# Patient Record
Sex: MALE | Race: BLACK OR AFRICAN AMERICAN | Employment: OTHER | URBAN - METROPOLITAN AREA
[De-identification: names, ages, dates, MRNs, and addresses within clinical notes are randomized per-mention and may not be internally consistent; named-entity substitution may affect disease eponyms.]

---

## 2018-05-21 ENCOUNTER — HOSPITAL ENCOUNTER (INPATIENT)
Age: 63
LOS: 4 days | Discharge: OTHER INSTITUTION | DRG: 617 | End: 2018-05-25
Attending: EMERGENCY MEDICINE | Admitting: INTERNAL MEDICINE
Payer: MEDICARE

## 2018-05-21 ENCOUNTER — APPOINTMENT (OUTPATIENT)
Dept: ULTRASOUND IMAGING | Age: 63
DRG: 617 | End: 2018-05-21
Payer: MEDICARE

## 2018-05-21 ENCOUNTER — APPOINTMENT (OUTPATIENT)
Dept: GENERAL RADIOLOGY | Age: 63
DRG: 617 | End: 2018-05-21
Payer: MEDICARE

## 2018-05-21 DIAGNOSIS — E11.621 DIABETIC FOOT ULCER ASSOCIATED WITH TYPE 2 DIABETES MELLITUS, UNSPECIFIED LATERALITY, UNSPECIFIED PART OF FOOT, UNSPECIFIED ULCER STAGE (HCC): Primary | ICD-10-CM

## 2018-05-21 DIAGNOSIS — L97.509 DIABETIC FOOT ULCER ASSOCIATED WITH TYPE 2 DIABETES MELLITUS, UNSPECIFIED LATERALITY, UNSPECIFIED PART OF FOOT, UNSPECIFIED ULCER STAGE (HCC): Primary | ICD-10-CM

## 2018-05-21 DIAGNOSIS — N17.9 ACUTE KIDNEY INJURY (HCC): ICD-10-CM

## 2018-05-21 DIAGNOSIS — E87.5 HYPERKALEMIA: ICD-10-CM

## 2018-05-21 PROBLEM — I73.9 PVD (PERIPHERAL VASCULAR DISEASE) (HCC): Status: ACTIVE | Noted: 2018-05-21

## 2018-05-21 PROBLEM — I10 HTN (HYPERTENSION): Status: ACTIVE | Noted: 2018-05-21

## 2018-05-21 PROBLEM — E11.628 DIABETIC FOOT INFECTION (HCC): Status: ACTIVE | Noted: 2018-05-21

## 2018-05-21 PROBLEM — E11.9 DM II (DIABETES MELLITUS, TYPE II), CONTROLLED (HCC): Status: ACTIVE | Noted: 2018-05-21

## 2018-05-21 PROBLEM — O22.30 DVT (DEEP VEIN THROMBOSIS) IN PREGNANCY: Status: ACTIVE | Noted: 2018-05-21

## 2018-05-21 PROBLEM — L08.9 DIABETIC FOOT INFECTION (HCC): Status: ACTIVE | Noted: 2018-05-21

## 2018-05-21 PROBLEM — E87.20 LACTIC ACIDOSIS: Status: ACTIVE | Noted: 2018-05-21

## 2018-05-21 PROBLEM — E87.1 HYPONATREMIA: Status: ACTIVE | Noted: 2018-05-21

## 2018-05-21 PROBLEM — D68.9 COAGULOPATHY (HCC): Status: ACTIVE | Noted: 2018-05-21

## 2018-05-21 LAB
ANION GAP SERPL CALCULATED.3IONS-SCNC: 13 MMOL/L (ref 7–16)
BUN BLDV-MCNC: 31 MG/DL (ref 8–23)
C-REACTIVE PROTEIN: 20.2 MG/DL (ref 0–0.4)
CALCIUM SERPL-MCNC: 9.9 MG/DL (ref 8.6–10.2)
CHLORIDE BLD-SCNC: 96 MMOL/L (ref 98–107)
CO2: 21 MMOL/L (ref 22–29)
CREAT SERPL-MCNC: 2.1 MG/DL (ref 0.7–1.2)
EKG ATRIAL RATE: 88 BPM
EKG P-R INTERVAL: 238 MS
EKG Q-T INTERVAL: 340 MS
EKG QRS DURATION: 88 MS
EKG QTC CALCULATION (BAZETT): 411 MS
EKG R AXIS: 7 DEGREES
EKG T AXIS: 51 DEGREES
EKG VENTRICULAR RATE: 88 BPM
GFR AFRICAN AMERICAN: 39
GFR NON-AFRICAN AMERICAN: 39 ML/MIN/1.73
GLUCOSE BLD-MCNC: 435 MG/DL (ref 74–109)
HCT VFR BLD CALC: 36.8 % (ref 37–54)
HEMOGLOBIN: 12.1 G/DL (ref 12.5–16.5)
INR BLD: 3.2
LACTIC ACID: 2.4 MMOL/L (ref 0.5–2.2)
MCH RBC QN AUTO: 26.4 PG (ref 26–35)
MCHC RBC AUTO-ENTMCNC: 32.9 % (ref 32–34.5)
MCV RBC AUTO: 80.3 FL (ref 80–99.9)
METER GLUCOSE: 360 MG/DL (ref 70–110)
PDW BLD-RTO: 13.7 FL (ref 11.5–15)
PLATELET # BLD: 211 E9/L (ref 130–450)
PMV BLD AUTO: 12.4 FL (ref 7–12)
POTASSIUM SERPL-SCNC: 6.1 MMOL/L (ref 3.5–5)
PROCALCITONIN: 0.23 NG/ML (ref 0–0.08)
PROTHROMBIN TIME: 36.6 SEC (ref 9.3–12.4)
RBC # BLD: 4.58 E12/L (ref 3.8–5.8)
SEDIMENTATION RATE, ERYTHROCYTE: 114 MM/HR (ref 0–15)
SODIUM BLD-SCNC: 130 MMOL/L (ref 132–146)
WBC # BLD: 17.9 E9/L (ref 4.5–11.5)

## 2018-05-21 PROCEDURE — 6370000000 HC RX 637 (ALT 250 FOR IP): Performed by: FAMILY MEDICINE

## 2018-05-21 PROCEDURE — 36415 COLL VENOUS BLD VENIPUNCTURE: CPT

## 2018-05-21 PROCEDURE — 84145 PROCALCITONIN (PCT): CPT

## 2018-05-21 PROCEDURE — 87186 SC STD MICRODIL/AGAR DIL: CPT

## 2018-05-21 PROCEDURE — 87040 BLOOD CULTURE FOR BACTERIA: CPT

## 2018-05-21 PROCEDURE — 73590 X-RAY EXAM OF LOWER LEG: CPT

## 2018-05-21 PROCEDURE — 87077 CULTURE AEROBIC IDENTIFY: CPT

## 2018-05-21 PROCEDURE — 99285 EMERGENCY DEPT VISIT HI MDM: CPT

## 2018-05-21 PROCEDURE — 85610 PROTHROMBIN TIME: CPT

## 2018-05-21 PROCEDURE — 86140 C-REACTIVE PROTEIN: CPT

## 2018-05-21 PROCEDURE — 85651 RBC SED RATE NONAUTOMATED: CPT

## 2018-05-21 PROCEDURE — 83605 ASSAY OF LACTIC ACID: CPT

## 2018-05-21 PROCEDURE — 82962 GLUCOSE BLOOD TEST: CPT

## 2018-05-21 PROCEDURE — 6360000002 HC RX W HCPCS: Performed by: FAMILY MEDICINE

## 2018-05-21 PROCEDURE — 87149 DNA/RNA DIRECT PROBE: CPT

## 2018-05-21 PROCEDURE — 80048 BASIC METABOLIC PNL TOTAL CA: CPT

## 2018-05-21 PROCEDURE — 93971 EXTREMITY STUDY: CPT

## 2018-05-21 PROCEDURE — 0KBV0ZZ EXCISION OF RIGHT FOOT MUSCLE, OPEN APPROACH: ICD-10-PCS | Performed by: PODIATRIST

## 2018-05-21 PROCEDURE — 2580000003 HC RX 258: Performed by: EMERGENCY MEDICINE

## 2018-05-21 PROCEDURE — 6360000002 HC RX W HCPCS: Performed by: EMERGENCY MEDICINE

## 2018-05-21 PROCEDURE — 2500000003 HC RX 250 WO HCPCS: Performed by: FAMILY MEDICINE

## 2018-05-21 PROCEDURE — 73630 X-RAY EXAM OF FOOT: CPT

## 2018-05-21 PROCEDURE — 93005 ELECTROCARDIOGRAM TRACING: CPT | Performed by: FAMILY MEDICINE

## 2018-05-21 PROCEDURE — 2580000003 HC RX 258: Performed by: FAMILY MEDICINE

## 2018-05-21 PROCEDURE — 85027 COMPLETE CBC AUTOMATED: CPT

## 2018-05-21 PROCEDURE — 1200000000 HC SEMI PRIVATE

## 2018-05-21 RX ORDER — SODIUM CHLORIDE 9 MG/ML
INJECTION, SOLUTION INTRAVENOUS CONTINUOUS
Status: DISCONTINUED | OUTPATIENT
Start: 2018-05-21 | End: 2018-05-25 | Stop reason: HOSPADM

## 2018-05-21 RX ORDER — MORPHINE SULFATE 4 MG/ML
4 INJECTION, SOLUTION INTRAMUSCULAR; INTRAVENOUS ONCE
Status: COMPLETED | OUTPATIENT
Start: 2018-05-21 | End: 2018-05-21

## 2018-05-21 RX ORDER — LORAZEPAM 1 MG/1
1 TABLET ORAL NIGHTLY PRN
Status: DISCONTINUED | OUTPATIENT
Start: 2018-05-21 | End: 2018-05-25 | Stop reason: HOSPADM

## 2018-05-21 RX ORDER — ESOMEPRAZOLE MAGNESIUM 40 MG/1
40 CAPSULE, DELAYED RELEASE ORAL
Status: ON HOLD | COMMUNITY
End: 2020-06-23 | Stop reason: HOSPADM

## 2018-05-21 RX ORDER — DEXTROSE MONOHYDRATE 50 MG/ML
100 INJECTION, SOLUTION INTRAVENOUS PRN
Status: DISCONTINUED | OUTPATIENT
Start: 2018-05-21 | End: 2018-05-25 | Stop reason: HOSPADM

## 2018-05-21 RX ORDER — METOPROLOL TARTRATE 50 MG/1
100 TABLET, FILM COATED ORAL 2 TIMES DAILY
Status: DISCONTINUED | OUTPATIENT
Start: 2018-05-21 | End: 2018-05-25 | Stop reason: HOSPADM

## 2018-05-21 RX ORDER — WARFARIN SODIUM 5 MG/1
5 TABLET ORAL
Status: ON HOLD | COMMUNITY
End: 2018-11-14 | Stop reason: HOSPADM

## 2018-05-21 RX ORDER — NICOTINE POLACRILEX 4 MG
15 LOZENGE BUCCAL PRN
Status: DISCONTINUED | OUTPATIENT
Start: 2018-05-21 | End: 2018-05-25 | Stop reason: HOSPADM

## 2018-05-21 RX ORDER — TIZANIDINE 4 MG/1
4 TABLET ORAL EVERY 8 HOURS PRN
Status: ON HOLD | COMMUNITY
End: 2020-06-23 | Stop reason: HOSPADM

## 2018-05-21 RX ORDER — ESOMEPRAZOLE MAGNESIUM 40 MG/1
40 FOR SUSPENSION ORAL DAILY
COMMUNITY
End: 2018-05-21

## 2018-05-21 RX ORDER — SODIUM CHLORIDE 0.9 % (FLUSH) 0.9 %
10 SYRINGE (ML) INJECTION EVERY 12 HOURS SCHEDULED
Status: DISCONTINUED | OUTPATIENT
Start: 2018-05-21 | End: 2018-05-25 | Stop reason: HOSPADM

## 2018-05-21 RX ORDER — DULOXETIN HYDROCHLORIDE 60 MG/1
120 CAPSULE, DELAYED RELEASE ORAL EVERY MORNING
Status: DISCONTINUED | OUTPATIENT
Start: 2018-05-22 | End: 2018-05-25 | Stop reason: HOSPADM

## 2018-05-21 RX ORDER — PREGABALIN 100 MG/1
100 CAPSULE ORAL 2 TIMES DAILY
Status: ON HOLD | COMMUNITY
End: 2020-06-23 | Stop reason: HOSPADM

## 2018-05-21 RX ORDER — LORAZEPAM 1 MG/1
1 TABLET ORAL NIGHTLY PRN
Status: ON HOLD | COMMUNITY
End: 2018-11-14 | Stop reason: HOSPADM

## 2018-05-21 RX ORDER — PANTOPRAZOLE SODIUM 40 MG/1
40 TABLET, DELAYED RELEASE ORAL
Status: DISCONTINUED | OUTPATIENT
Start: 2018-05-22 | End: 2018-05-25 | Stop reason: HOSPADM

## 2018-05-21 RX ORDER — METOPROLOL TARTRATE 100 MG/1
100 TABLET ORAL 2 TIMES DAILY
COMMUNITY
End: 2018-12-12 | Stop reason: SDUPTHER

## 2018-05-21 RX ORDER — DEXTROSE MONOHYDRATE 25 G/50ML
12.5 INJECTION, SOLUTION INTRAVENOUS PRN
Status: DISCONTINUED | OUTPATIENT
Start: 2018-05-21 | End: 2018-05-25 | Stop reason: HOSPADM

## 2018-05-21 RX ORDER — LISINOPRIL 10 MG/1
10 TABLET ORAL 2 TIMES DAILY
Status: ON HOLD | COMMUNITY
End: 2018-05-30 | Stop reason: HOSPADM

## 2018-05-21 RX ORDER — CILOSTAZOL 100 MG/1
100 TABLET ORAL 2 TIMES DAILY
Status: DISCONTINUED | OUTPATIENT
Start: 2018-05-21 | End: 2018-05-25 | Stop reason: HOSPADM

## 2018-05-21 RX ORDER — OXYCODONE HYDROCHLORIDE AND ACETAMINOPHEN 5; 325 MG/1; MG/1
1 TABLET ORAL EVERY 6 HOURS PRN
Status: DISCONTINUED | OUTPATIENT
Start: 2018-05-21 | End: 2018-05-25 | Stop reason: HOSPADM

## 2018-05-21 RX ORDER — SODIUM CHLORIDE 0.9 % (FLUSH) 0.9 %
10 SYRINGE (ML) INJECTION PRN
Status: DISCONTINUED | OUTPATIENT
Start: 2018-05-21 | End: 2018-05-21 | Stop reason: SDUPTHER

## 2018-05-21 RX ORDER — DULOXETIN HYDROCHLORIDE 60 MG/1
120 CAPSULE, DELAYED RELEASE ORAL EVERY MORNING
Status: ON HOLD | COMMUNITY
End: 2018-11-14 | Stop reason: HOSPADM

## 2018-05-21 RX ORDER — SPIRONOLACTONE 50 MG/1
50 TABLET, FILM COATED ORAL EVERY MORNING
COMMUNITY
End: 2020-10-13

## 2018-05-21 RX ORDER — INSULIN HUMAN 500 [IU]/ML
INJECTION, SOLUTION SUBCUTANEOUS
Status: ON HOLD | COMMUNITY
End: 2018-05-30 | Stop reason: HOSPADM

## 2018-05-21 RX ORDER — AMLODIPINE BESYLATE 10 MG/1
10 TABLET ORAL EVERY MORNING
Status: DISCONTINUED | OUTPATIENT
Start: 2018-05-22 | End: 2018-05-25 | Stop reason: HOSPADM

## 2018-05-21 RX ORDER — CLINDAMYCIN PHOSPHATE 600 MG/50ML
600 INJECTION INTRAVENOUS ONCE
Status: DISCONTINUED | OUTPATIENT
Start: 2018-05-21 | End: 2018-05-21

## 2018-05-21 RX ORDER — ONDANSETRON 2 MG/ML
4 INJECTION INTRAMUSCULAR; INTRAVENOUS EVERY 6 HOURS PRN
Status: DISCONTINUED | OUTPATIENT
Start: 2018-05-21 | End: 2018-05-25 | Stop reason: HOSPADM

## 2018-05-21 RX ORDER — OXYCODONE HCL 10 MG/1
10 TABLET, FILM COATED, EXTENDED RELEASE ORAL EVERY 12 HOURS
Status: DISCONTINUED | OUTPATIENT
Start: 2018-05-21 | End: 2018-05-25 | Stop reason: HOSPADM

## 2018-05-21 RX ORDER — SODIUM POLYSTYRENE SULFONATE 15 G/60ML
15 SUSPENSION ORAL; RECTAL ONCE
Status: COMPLETED | OUTPATIENT
Start: 2018-05-21 | End: 2018-05-22

## 2018-05-21 RX ORDER — SODIUM CHLORIDE 0.9 % (FLUSH) 0.9 %
10 SYRINGE (ML) INJECTION PRN
Status: DISCONTINUED | OUTPATIENT
Start: 2018-05-21 | End: 2018-05-24 | Stop reason: SDUPTHER

## 2018-05-21 RX ORDER — CLONIDINE HYDROCHLORIDE 0.2 MG/1
0.2 TABLET ORAL 3 TIMES DAILY
Status: DISCONTINUED | OUTPATIENT
Start: 2018-05-21 | End: 2018-05-25 | Stop reason: HOSPADM

## 2018-05-21 RX ORDER — CILOSTAZOL 100 MG/1
100 TABLET ORAL 2 TIMES DAILY
Status: ON HOLD | COMMUNITY
End: 2020-06-23 | Stop reason: HOSPADM

## 2018-05-21 RX ORDER — AMLODIPINE BESYLATE 10 MG/1
10 TABLET ORAL EVERY MORNING
Status: ON HOLD | COMMUNITY
End: 2018-08-07

## 2018-05-21 RX ORDER — TIZANIDINE 4 MG/1
4 TABLET ORAL 3 TIMES DAILY
Status: DISCONTINUED | OUTPATIENT
Start: 2018-05-21 | End: 2018-05-25 | Stop reason: HOSPADM

## 2018-05-21 RX ORDER — OXYCODONE HCL 10 MG/1
10 TABLET, FILM COATED, EXTENDED RELEASE ORAL EVERY 8 HOURS
Status: ON HOLD | COMMUNITY
End: 2018-11-14 | Stop reason: HOSPADM

## 2018-05-21 RX ORDER — CLONIDINE HYDROCHLORIDE 0.2 MG/1
0.2 TABLET ORAL 3 TIMES DAILY
Status: ON HOLD | COMMUNITY
End: 2018-08-07

## 2018-05-21 RX ORDER — CLINDAMYCIN PHOSPHATE 900 MG/50ML
900 INJECTION INTRAVENOUS ONCE
Status: COMPLETED | OUTPATIENT
Start: 2018-05-21 | End: 2018-05-22

## 2018-05-21 RX ORDER — ATORVASTATIN CALCIUM 10 MG/1
10 TABLET, FILM COATED ORAL DAILY
Status: DISCONTINUED | OUTPATIENT
Start: 2018-05-22 | End: 2018-05-25 | Stop reason: HOSPADM

## 2018-05-21 RX ORDER — ONDANSETRON 2 MG/ML
4 INJECTION INTRAMUSCULAR; INTRAVENOUS EVERY 6 HOURS PRN
Status: DISCONTINUED | OUTPATIENT
Start: 2018-05-21 | End: 2018-05-21 | Stop reason: SDUPTHER

## 2018-05-21 RX ORDER — WARFARIN SODIUM 10 MG/1
10 TABLET ORAL
Status: ON HOLD | COMMUNITY
End: 2018-11-14 | Stop reason: HOSPADM

## 2018-05-21 RX ORDER — SODIUM CHLORIDE 0.9 % (FLUSH) 0.9 %
10 SYRINGE (ML) INJECTION EVERY 12 HOURS SCHEDULED
Status: DISCONTINUED | OUTPATIENT
Start: 2018-05-21 | End: 2018-05-21 | Stop reason: SDUPTHER

## 2018-05-21 RX ORDER — ATORVASTATIN CALCIUM 10 MG/1
10 TABLET, FILM COATED ORAL DAILY
COMMUNITY
End: 2020-10-13

## 2018-05-21 RX ADMIN — INSULIN HUMAN 10 UNITS: 100 INJECTION, SOLUTION PARENTERAL at 18:22

## 2018-05-21 RX ADMIN — VANCOMYCIN HYDROCHLORIDE 2000 MG: 10 INJECTION, POWDER, LYOPHILIZED, FOR SOLUTION INTRAVENOUS at 18:18

## 2018-05-21 RX ADMIN — OXYCODONE HYDROCHLORIDE AND ACETAMINOPHEN 1 TABLET: 5; 325 TABLET ORAL at 16:26

## 2018-05-21 RX ADMIN — MORPHINE SULFATE 4 MG: 4 INJECTION INTRAVENOUS at 17:58

## 2018-05-21 RX ADMIN — CALCIUM GLUCONATE 2 G: 98 INJECTION, SOLUTION INTRAVENOUS at 18:24

## 2018-05-21 RX ADMIN — SODIUM BICARBONATE 50 MEQ: 84 INJECTION, SOLUTION INTRAVENOUS at 18:00

## 2018-05-21 ASSESSMENT — PAIN DESCRIPTION - LOCATION: LOCATION: LEG

## 2018-05-21 ASSESSMENT — ENCOUNTER SYMPTOMS
NAUSEA: 0
COUGH: 0
SHORTNESS OF BREATH: 0
CHEST TIGHTNESS: 0
ABDOMINAL PAIN: 0
DIARRHEA: 0
CONSTIPATION: 0
VOMITING: 0

## 2018-05-21 ASSESSMENT — PAIN SCALES - GENERAL
PAINLEVEL_OUTOF10: 5
PAINLEVEL_OUTOF10: 7
PAINLEVEL_OUTOF10: 10

## 2018-05-21 ASSESSMENT — PAIN DESCRIPTION - ORIENTATION: ORIENTATION: RIGHT

## 2018-05-22 ENCOUNTER — APPOINTMENT (OUTPATIENT)
Dept: INTERVENTIONAL RADIOLOGY/VASCULAR | Age: 63
DRG: 617 | End: 2018-05-22
Payer: MEDICARE

## 2018-05-22 PROBLEM — L97.412 DIABETIC ULCER OF RIGHT MIDFOOT ASSOCIATED WITH TYPE 2 DIABETES MELLITUS, WITH FAT LAYER EXPOSED (HCC): Chronic | Status: ACTIVE | Noted: 2018-05-22

## 2018-05-22 PROBLEM — E11.621 DIABETIC ULCER OF RIGHT MIDFOOT ASSOCIATED WITH TYPE 2 DIABETES MELLITUS, WITH FAT LAYER EXPOSED (HCC): Chronic | Status: ACTIVE | Noted: 2018-05-22

## 2018-05-22 PROBLEM — I70.45 ATHEROSCLEROSIS OF AUTOLOGOUS VEIN BYPASS GRAFT OF EXTREMITY WITH ULCERATION (HCC): Chronic | Status: ACTIVE | Noted: 2018-05-22

## 2018-05-22 LAB
ANION GAP SERPL CALCULATED.3IONS-SCNC: 14 MMOL/L (ref 7–16)
ANION GAP SERPL CALCULATED.3IONS-SCNC: 15 MMOL/L (ref 7–16)
BASOPHILS ABSOLUTE: 0.06 E9/L (ref 0–0.2)
BASOPHILS RELATIVE PERCENT: 0.3 % (ref 0–2)
BUN BLDV-MCNC: 29 MG/DL (ref 8–23)
BUN BLDV-MCNC: 33 MG/DL (ref 8–23)
CALCIUM SERPL-MCNC: 9.3 MG/DL (ref 8.6–10.2)
CALCIUM SERPL-MCNC: 9.5 MG/DL (ref 8.6–10.2)
CHLORIDE BLD-SCNC: 96 MMOL/L (ref 98–107)
CHLORIDE BLD-SCNC: 97 MMOL/L (ref 98–107)
CO2: 20 MMOL/L (ref 22–29)
CO2: 21 MMOL/L (ref 22–29)
CREAT SERPL-MCNC: 2.2 MG/DL (ref 0.7–1.2)
CREAT SERPL-MCNC: 2.5 MG/DL (ref 0.7–1.2)
EOSINOPHILS ABSOLUTE: 0.06 E9/L (ref 0.05–0.5)
EOSINOPHILS RELATIVE PERCENT: 0.3 % (ref 0–6)
GFR AFRICAN AMERICAN: 32
GFR AFRICAN AMERICAN: 37
GFR NON-AFRICAN AMERICAN: 32 ML/MIN/1.73
GFR NON-AFRICAN AMERICAN: 37 ML/MIN/1.73
GLUCOSE BLD-MCNC: 328 MG/DL (ref 74–109)
GLUCOSE BLD-MCNC: 427 MG/DL (ref 74–109)
HBA1C MFR BLD: 13.3 % (ref 4.8–5.9)
HCT VFR BLD CALC: 33.7 % (ref 37–54)
HEMOGLOBIN: 11.3 G/DL (ref 12.5–16.5)
IMMATURE GRANULOCYTES #: 0.15 E9/L
IMMATURE GRANULOCYTES %: 0.9 % (ref 0–5)
INR BLD: 3.4
LACTIC ACID: 1 MMOL/L (ref 0.5–2.2)
LYMPHOCYTES ABSOLUTE: 1.93 E9/L (ref 1.5–4)
LYMPHOCYTES RELATIVE PERCENT: 11.2 % (ref 20–42)
MCH RBC QN AUTO: 26.5 PG (ref 26–35)
MCHC RBC AUTO-ENTMCNC: 33.5 % (ref 32–34.5)
MCV RBC AUTO: 78.9 FL (ref 80–99.9)
METER GLUCOSE: 299 MG/DL (ref 70–110)
METER GLUCOSE: 374 MG/DL (ref 70–110)
METER GLUCOSE: 441 MG/DL (ref 70–110)
METER GLUCOSE: 453 MG/DL (ref 70–110)
MONOCYTES ABSOLUTE: 1.55 E9/L (ref 0.1–0.95)
MONOCYTES RELATIVE PERCENT: 9 % (ref 2–12)
NEUTROPHILS ABSOLUTE: 13.45 E9/L (ref 1.8–7.3)
NEUTROPHILS RELATIVE PERCENT: 78.3 % (ref 43–80)
PDW BLD-RTO: 13.7 FL (ref 11.5–15)
PLATELET # BLD: 190 E9/L (ref 130–450)
PMV BLD AUTO: 13.1 FL (ref 7–12)
POTASSIUM REFLEX MAGNESIUM: 5.8 MMOL/L (ref 3.5–5)
POTASSIUM SERPL-SCNC: 5 MMOL/L (ref 3.5–5)
POTASSIUM SERPL-SCNC: 5.6 MMOL/L (ref 3.5–5)
PROTHROMBIN TIME: 38.7 SEC (ref 9.3–12.4)
RBC # BLD: 4.27 E12/L (ref 3.8–5.8)
RBC # BLD: NORMAL 10*6/UL
SODIUM BLD-SCNC: 131 MMOL/L (ref 132–146)
SODIUM BLD-SCNC: 132 MMOL/L (ref 132–146)
WBC # BLD: 17.2 E9/L (ref 4.5–11.5)

## 2018-05-22 PROCEDURE — 2580000003 HC RX 258: Performed by: INTERNAL MEDICINE

## 2018-05-22 PROCEDURE — 84132 ASSAY OF SERUM POTASSIUM: CPT

## 2018-05-22 PROCEDURE — 83605 ASSAY OF LACTIC ACID: CPT

## 2018-05-22 PROCEDURE — 85025 COMPLETE CBC W/AUTO DIFF WBC: CPT

## 2018-05-22 PROCEDURE — 2500000003 HC RX 250 WO HCPCS: Performed by: SPECIALIST

## 2018-05-22 PROCEDURE — G8988 SELF CARE GOAL STATUS: HCPCS

## 2018-05-22 PROCEDURE — 2500000003 HC RX 250 WO HCPCS: Performed by: EMERGENCY MEDICINE

## 2018-05-22 PROCEDURE — 87077 CULTURE AEROBIC IDENTIFY: CPT

## 2018-05-22 PROCEDURE — 87075 CULTR BACTERIA EXCEPT BLOOD: CPT

## 2018-05-22 PROCEDURE — 83036 HEMOGLOBIN GLYCOSYLATED A1C: CPT

## 2018-05-22 PROCEDURE — 93923 UPR/LXTR ART STDY 3+ LVLS: CPT

## 2018-05-22 PROCEDURE — 36415 COLL VENOUS BLD VENIPUNCTURE: CPT

## 2018-05-22 PROCEDURE — 6360000002 HC RX W HCPCS: Performed by: INTERNAL MEDICINE

## 2018-05-22 PROCEDURE — 6370000000 HC RX 637 (ALT 250 FOR IP): Performed by: FAMILY MEDICINE

## 2018-05-22 PROCEDURE — 1200000000 HC SEMI PRIVATE

## 2018-05-22 PROCEDURE — 6370000000 HC RX 637 (ALT 250 FOR IP): Performed by: PODIATRIST

## 2018-05-22 PROCEDURE — 6370000000 HC RX 637 (ALT 250 FOR IP): Performed by: NURSE PRACTITIONER

## 2018-05-22 PROCEDURE — 82962 GLUCOSE BLOOD TEST: CPT

## 2018-05-22 PROCEDURE — 85610 PROTHROMBIN TIME: CPT

## 2018-05-22 PROCEDURE — 6370000000 HC RX 637 (ALT 250 FOR IP): Performed by: INTERNAL MEDICINE

## 2018-05-22 PROCEDURE — 87186 SC STD MICRODIL/AGAR DIL: CPT

## 2018-05-22 PROCEDURE — 99222 1ST HOSP IP/OBS MODERATE 55: CPT | Performed by: SURGERY

## 2018-05-22 PROCEDURE — 97161 PT EVAL LOW COMPLEX 20 MIN: CPT

## 2018-05-22 PROCEDURE — 80048 BASIC METABOLIC PNL TOTAL CA: CPT

## 2018-05-22 PROCEDURE — 97165 OT EVAL LOW COMPLEX 30 MIN: CPT

## 2018-05-22 PROCEDURE — G8987 SELF CARE CURRENT STATUS: HCPCS

## 2018-05-22 PROCEDURE — 87070 CULTURE OTHR SPECIMN AEROBIC: CPT

## 2018-05-22 RX ORDER — CLINDAMYCIN PHOSPHATE 900 MG/50ML
900 INJECTION INTRAVENOUS EVERY 8 HOURS
Status: DISCONTINUED | OUTPATIENT
Start: 2018-05-22 | End: 2018-05-25 | Stop reason: HOSPADM

## 2018-05-22 RX ORDER — INSULIN GLARGINE 100 [IU]/ML
15 INJECTION, SOLUTION SUBCUTANEOUS NIGHTLY
Status: DISCONTINUED | OUTPATIENT
Start: 2018-05-22 | End: 2018-05-25 | Stop reason: HOSPADM

## 2018-05-22 RX ORDER — SODIUM POLYSTYRENE SULFONATE 15 G/60ML
15 SUSPENSION ORAL; RECTAL ONCE
Status: COMPLETED | OUTPATIENT
Start: 2018-05-22 | End: 2018-05-22

## 2018-05-22 RX ADMIN — SODIUM POLYSTYRENE SULFONATE 15 G: 15 SUSPENSION ORAL; RECTAL at 13:45

## 2018-05-22 RX ADMIN — PIPERACILLIN SODIUM AND TAZOBACTAM SODIUM 3.38 G: 3; .375 INJECTION, POWDER, LYOPHILIZED, FOR SOLUTION INTRAVENOUS at 17:58

## 2018-05-22 RX ADMIN — INSULIN HUMAN 80 UNITS: 100 INJECTION, SOLUTION PARENTERAL at 17:58

## 2018-05-22 RX ADMIN — INSULIN LISPRO 18 UNITS: 100 INJECTION, SOLUTION INTRAVENOUS; SUBCUTANEOUS at 09:30

## 2018-05-22 RX ADMIN — METOPROLOL TARTRATE 100 MG: 50 TABLET ORAL at 09:21

## 2018-05-22 RX ADMIN — PANTOPRAZOLE SODIUM 40 MG: 40 TABLET, DELAYED RELEASE ORAL at 06:16

## 2018-05-22 RX ADMIN — PREGABALIN 200 MG: 75 CAPSULE ORAL at 00:42

## 2018-05-22 RX ADMIN — CLONIDINE HYDROCHLORIDE 0.2 MG: 0.2 TABLET ORAL at 06:17

## 2018-05-22 RX ADMIN — INSULIN HUMAN 70 UNITS: 100 INJECTION, SOLUTION PARENTERAL at 13:33

## 2018-05-22 RX ADMIN — PIPERACILLIN SODIUM AND TAZOBACTAM SODIUM 3.38 G: 3; .375 INJECTION, POWDER, LYOPHILIZED, FOR SOLUTION INTRAVENOUS at 11:03

## 2018-05-22 RX ADMIN — OXYCODONE HYDROCHLORIDE 10 MG: 10 TABLET, FILM COATED, EXTENDED RELEASE ORAL at 22:04

## 2018-05-22 RX ADMIN — TIZANIDINE 4 MG: 4 TABLET ORAL at 09:21

## 2018-05-22 RX ADMIN — METOPROLOL TARTRATE 100 MG: 50 TABLET ORAL at 00:44

## 2018-05-22 RX ADMIN — PREGABALIN 200 MG: 75 CAPSULE ORAL at 13:46

## 2018-05-22 RX ADMIN — TIZANIDINE 4 MG: 4 TABLET ORAL at 22:03

## 2018-05-22 RX ADMIN — CILOSTAZOL 100 MG: 100 TABLET ORAL at 09:21

## 2018-05-22 RX ADMIN — ATORVASTATIN CALCIUM 10 MG: 10 TABLET, FILM COATED ORAL at 09:22

## 2018-05-22 RX ADMIN — Medication 10 ML: at 00:48

## 2018-05-22 RX ADMIN — CILOSTAZOL 100 MG: 100 TABLET ORAL at 22:04

## 2018-05-22 RX ADMIN — PETROLATUM: 42 OINTMENT TOPICAL at 09:22

## 2018-05-22 RX ADMIN — OXYCODONE HYDROCHLORIDE 10 MG: 10 TABLET, FILM COATED, EXTENDED RELEASE ORAL at 00:44

## 2018-05-22 RX ADMIN — INSULIN LISPRO 18 UNITS: 100 INJECTION, SOLUTION INTRAVENOUS; SUBCUTANEOUS at 13:35

## 2018-05-22 RX ADMIN — CLONIDINE HYDROCHLORIDE 0.2 MG: 0.2 TABLET ORAL at 13:21

## 2018-05-22 RX ADMIN — SODIUM POLYSTYRENE SULFONATE 15 G: 15 SUSPENSION ORAL; RECTAL at 03:43

## 2018-05-22 RX ADMIN — PETROLATUM: 42 OINTMENT TOPICAL at 00:50

## 2018-05-22 RX ADMIN — SODIUM CHLORIDE: 9 INJECTION, SOLUTION INTRAVENOUS at 22:07

## 2018-05-22 RX ADMIN — CLINDAMYCIN PHOSPHATE 900 MG: 900 INJECTION INTRAVENOUS at 16:05

## 2018-05-22 RX ADMIN — CLINDAMYCIN PHOSPHATE 900 MG: 900 INJECTION INTRAVENOUS at 01:31

## 2018-05-22 RX ADMIN — OXYCODONE HYDROCHLORIDE 10 MG: 10 TABLET, FILM COATED, EXTENDED RELEASE ORAL at 09:22

## 2018-05-22 RX ADMIN — TIZANIDINE 4 MG: 4 TABLET ORAL at 13:46

## 2018-05-22 RX ADMIN — PREGABALIN 200 MG: 75 CAPSULE ORAL at 22:03

## 2018-05-22 RX ADMIN — TIZANIDINE 4 MG: 4 TABLET ORAL at 00:40

## 2018-05-22 RX ADMIN — Medication 10 ML: at 11:04

## 2018-05-22 RX ADMIN — PETROLATUM: 42 OINTMENT TOPICAL at 22:11

## 2018-05-22 RX ADMIN — SODIUM CHLORIDE: 9 INJECTION, SOLUTION INTRAVENOUS at 00:39

## 2018-05-22 RX ADMIN — PIPERACILLIN SODIUM AND TAZOBACTAM SODIUM 3.38 G: 3; .375 INJECTION, POWDER, LYOPHILIZED, FOR SOLUTION INTRAVENOUS at 01:35

## 2018-05-22 RX ADMIN — INSULIN LISPRO 7 UNITS: 100 INJECTION, SOLUTION INTRAVENOUS; SUBCUTANEOUS at 00:40

## 2018-05-22 RX ADMIN — AMLODIPINE BESYLATE 10 MG: 10 TABLET ORAL at 09:21

## 2018-05-22 RX ADMIN — INSULIN LISPRO 15 UNITS: 100 INJECTION, SOLUTION INTRAVENOUS; SUBCUTANEOUS at 17:57

## 2018-05-22 RX ADMIN — PREGABALIN 200 MG: 75 CAPSULE ORAL at 06:15

## 2018-05-22 RX ADMIN — DULOXETINE HYDROCHLORIDE 120 MG: 60 CAPSULE, DELAYED RELEASE ORAL at 09:21

## 2018-05-22 RX ADMIN — CILOSTAZOL 100 MG: 100 TABLET ORAL at 00:39

## 2018-05-22 RX ADMIN — INSULIN GLARGINE 15 UNITS: 100 INJECTION, SOLUTION SUBCUTANEOUS at 22:04

## 2018-05-22 RX ADMIN — INSULIN LISPRO 5 UNITS: 100 INJECTION, SOLUTION INTRAVENOUS; SUBCUTANEOUS at 22:04

## 2018-05-22 ASSESSMENT — PAIN DESCRIPTION - ORIENTATION: ORIENTATION: RIGHT

## 2018-05-22 ASSESSMENT — PAIN SCALES - GENERAL
PAINLEVEL_OUTOF10: 5
PAINLEVEL_OUTOF10: 5
PAINLEVEL_OUTOF10: 4
PAINLEVEL_OUTOF10: 0
PAINLEVEL_OUTOF10: 8

## 2018-05-22 ASSESSMENT — PAIN DESCRIPTION - LOCATION: LOCATION: LEG

## 2018-05-22 ASSESSMENT — PAIN DESCRIPTION - PROGRESSION: CLINICAL_PROGRESSION: NOT CHANGED

## 2018-05-22 ASSESSMENT — PAIN DESCRIPTION - FREQUENCY: FREQUENCY: INTERMITTENT

## 2018-05-22 ASSESSMENT — PAIN DESCRIPTION - ONSET: ONSET: ON-GOING

## 2018-05-23 ENCOUNTER — APPOINTMENT (OUTPATIENT)
Dept: ULTRASOUND IMAGING | Age: 63
DRG: 617 | End: 2018-05-23
Payer: MEDICARE

## 2018-05-23 ENCOUNTER — ANESTHESIA EVENT (OUTPATIENT)
Dept: OPERATING ROOM | Age: 63
DRG: 617 | End: 2018-05-23
Payer: MEDICARE

## 2018-05-23 PROBLEM — E44.0 MODERATE PROTEIN-CALORIE MALNUTRITION (HCC): Chronic | Status: ACTIVE | Noted: 2018-05-23

## 2018-05-23 LAB
ALBUMIN SERPL-MCNC: 3.2 G/DL (ref 3.5–5.2)
ALP BLD-CCNC: 168 U/L (ref 40–129)
ALT SERPL-CCNC: 90 U/L (ref 0–40)
AMORPHOUS: ABNORMAL
ANION GAP SERPL CALCULATED.3IONS-SCNC: 16 MMOL/L (ref 7–16)
ANTISTREPTOLYSIN-O: 30 IU/ML (ref 0–200)
AST SERPL-CCNC: 70 U/L (ref 0–39)
BACTERIA: ABNORMAL /HPF
BILIRUB SERPL-MCNC: 1 MG/DL (ref 0–1.2)
BILIRUBIN URINE: NEGATIVE
BLOOD, URINE: ABNORMAL
BUN BLDV-MCNC: 32 MG/DL (ref 8–23)
C-REACTIVE PROTEIN: 23.5 MG/DL (ref 0–0.4)
CALCIUM IONIZED: 1.27 MMOL/L (ref 1.15–1.33)
CALCIUM SERPL-MCNC: 9.2 MG/DL (ref 8.6–10.2)
CHLORIDE BLD-SCNC: 95 MMOL/L (ref 98–107)
CHLORIDE URINE RANDOM: <20 MMOL/L
CHOLESTEROL, TOTAL: 108 MG/DL (ref 0–199)
CLARITY: CLEAR
CO2: 21 MMOL/L (ref 22–29)
COLOR: YELLOW
CREAT SERPL-MCNC: 2.5 MG/DL (ref 0.7–1.2)
CREATININE URINE: 210 MG/DL (ref 40–278)
CREATININE URINE: 222 MG/DL (ref 40–278)
EPITHELIAL CELLS, UA: ABNORMAL /HPF
FERRITIN: 1089 NG/ML
FOLATE: 5.8 NG/ML (ref 4.8–24.2)
GFR AFRICAN AMERICAN: 32
GFR NON-AFRICAN AMERICAN: 32 ML/MIN/1.73
GLUCOSE BLD-MCNC: 185 MG/DL (ref 74–109)
GLUCOSE URINE: 100 MG/DL
HBV SURFACE AB TITR SER: NORMAL {TITER}
HCT VFR BLD CALC: 32 % (ref 37–54)
HDLC SERPL-MCNC: 26 MG/DL
HEMOGLOBIN: 10.6 G/DL (ref 12.5–16.5)
HEPATITIS B SURFACE ANTIGEN INTERPRETATION: NORMAL
HEPATITIS C ANTIBODY INTERPRETATION: NORMAL
IMMATURE RETIC FRACT: 9.4 % (ref 2.3–13.4)
IRON SATURATION: 14 % (ref 20–55)
IRON: 24 MCG/DL (ref 59–158)
KETONES, URINE: NEGATIVE MG/DL
LDL CHOLESTEROL CALCULATED: 66 MG/DL (ref 0–99)
LEUKOCYTE ESTERASE, URINE: NEGATIVE
MAGNESIUM: 1.5 MG/DL (ref 1.6–2.6)
MCH RBC QN AUTO: 26.6 PG (ref 26–35)
MCHC RBC AUTO-ENTMCNC: 33.1 % (ref 32–34.5)
MCV RBC AUTO: 80.4 FL (ref 80–99.9)
METER GLUCOSE: 189 MG/DL (ref 70–110)
METER GLUCOSE: 250 MG/DL (ref 70–110)
METER GLUCOSE: 352 MG/DL (ref 70–110)
METER GLUCOSE: 400 MG/DL (ref 70–110)
MICROALBUMIN UR-MCNC: 71.6 MG/L
MICROALBUMIN/CREAT UR-RTO: 32.3 (ref 0–30)
NITRITE, URINE: NEGATIVE
PARATHYROID HORMONE INTACT: 62 PG/ML (ref 15–65)
PDW BLD-RTO: 13.5 FL (ref 11.5–15)
PH UA: 5.5 (ref 5–9)
PHOSPHORUS: 4 MG/DL (ref 2.5–4.5)
PLATELET # BLD: 180 E9/L (ref 130–450)
PMV BLD AUTO: 12.2 FL (ref 7–12)
POTASSIUM SERPL-SCNC: 4.8 MMOL/L (ref 3.5–5)
POTASSIUM, UR: 41.7 MMOL/L
PREALBUMIN: 7 MG/DL (ref 20–40)
PROTEIN PROTEIN: 75 MG/DL (ref 0–12)
PROTEIN UA: 30 MG/DL
PROTEIN/CREAT RATIO: 0.4
PROTEIN/CREAT RATIO: 0.4 (ref 0–0.2)
RBC # BLD: 3.98 E12/L (ref 3.8–5.8)
RBC UA: ABNORMAL /HPF (ref 0–2)
RETIC HGB EQUIVALENT: 27.9 PG (ref 28.2–36.6)
RETICULOCYTE ABSOLUTE COUNT: 0.06 E12/L
RETICULOCYTE COUNT PCT: 1.5 % (ref 0.4–1.9)
SEDIMENTATION RATE, ERYTHROCYTE: 128 MM/HR (ref 0–15)
SODIUM BLD-SCNC: 132 MMOL/L (ref 132–146)
SODIUM URINE: 33 MMOL/L
SPECIFIC GRAVITY UA: 1.02 (ref 1–1.03)
T3 FREE: 1.9 PG/ML (ref 2–4.4)
T4 FREE: 1.16 NG/DL (ref 0.93–1.7)
TOTAL IRON BINDING CAPACITY: 166 MCG/DL (ref 250–450)
TOTAL PROTEIN: 7.7 G/DL (ref 6.4–8.3)
TRIGL SERPL-MCNC: 81 MG/DL (ref 0–149)
TSH SERPL DL<=0.05 MIU/L-ACNC: 0.52 UIU/ML (ref 0.27–4.2)
UREA NITROGEN, UR: 633 MG/DL (ref 800–1666)
URIC ACID, SERUM: 7 MG/DL (ref 3.4–7)
UROBILINOGEN, URINE: 1 E.U./DL
VITAMIN B-12: 581 PG/ML (ref 211–946)
VITAMIN D 25-HYDROXY: 24 NG/ML (ref 30–100)
VLDLC SERPL CALC-MCNC: 16 MG/DL
WBC # BLD: 19.7 E9/L (ref 4.5–11.5)
WBC UA: ABNORMAL /HPF (ref 0–5)

## 2018-05-23 PROCEDURE — 84165 PROTEIN E-PHORESIS SERUM: CPT

## 2018-05-23 PROCEDURE — 86334 IMMUNOFIX E-PHORESIS SERUM: CPT

## 2018-05-23 PROCEDURE — 86140 C-REACTIVE PROTEIN: CPT

## 2018-05-23 PROCEDURE — 84133 ASSAY OF URINE POTASSIUM: CPT

## 2018-05-23 PROCEDURE — 84481 FREE ASSAY (FT-3): CPT

## 2018-05-23 PROCEDURE — 80061 LIPID PANEL: CPT

## 2018-05-23 PROCEDURE — 84439 ASSAY OF FREE THYROXINE: CPT

## 2018-05-23 PROCEDURE — 87088 URINE BACTERIA CULTURE: CPT

## 2018-05-23 PROCEDURE — 82570 ASSAY OF URINE CREATININE: CPT

## 2018-05-23 PROCEDURE — 82962 GLUCOSE BLOOD TEST: CPT

## 2018-05-23 PROCEDURE — 2580000003 HC RX 258: Performed by: INTERNAL MEDICINE

## 2018-05-23 PROCEDURE — 51798 US URINE CAPACITY MEASURE: CPT

## 2018-05-23 PROCEDURE — 6360000002 HC RX W HCPCS: Performed by: INTERNAL MEDICINE

## 2018-05-23 PROCEDURE — 83540 ASSAY OF IRON: CPT

## 2018-05-23 PROCEDURE — 86803 HEPATITIS C AB TEST: CPT

## 2018-05-23 PROCEDURE — 84156 ASSAY OF PROTEIN URINE: CPT

## 2018-05-23 PROCEDURE — 76770 US EXAM ABDO BACK WALL COMP: CPT

## 2018-05-23 PROCEDURE — 82436 ASSAY OF URINE CHLORIDE: CPT

## 2018-05-23 PROCEDURE — 84550 ASSAY OF BLOOD/URIC ACID: CPT

## 2018-05-23 PROCEDURE — 84100 ASSAY OF PHOSPHORUS: CPT

## 2018-05-23 PROCEDURE — 84134 ASSAY OF PREALBUMIN: CPT

## 2018-05-23 PROCEDURE — 82746 ASSAY OF FOLIC ACID SERUM: CPT

## 2018-05-23 PROCEDURE — 81001 URINALYSIS AUTO W/SCOPE: CPT

## 2018-05-23 PROCEDURE — 87040 BLOOD CULTURE FOR BACTERIA: CPT

## 2018-05-23 PROCEDURE — 1200000000 HC SEMI PRIVATE

## 2018-05-23 PROCEDURE — 82306 VITAMIN D 25 HYDROXY: CPT

## 2018-05-23 PROCEDURE — 82044 UR ALBUMIN SEMIQUANTITATIVE: CPT

## 2018-05-23 PROCEDURE — 85651 RBC SED RATE NONAUTOMATED: CPT

## 2018-05-23 PROCEDURE — 6370000000 HC RX 637 (ALT 250 FOR IP): Performed by: INTERNAL MEDICINE

## 2018-05-23 PROCEDURE — 2500000003 HC RX 250 WO HCPCS: Performed by: SPECIALIST

## 2018-05-23 PROCEDURE — 84300 ASSAY OF URINE SODIUM: CPT

## 2018-05-23 PROCEDURE — 85045 AUTOMATED RETICULOCYTE COUNT: CPT

## 2018-05-23 PROCEDURE — 87340 HEPATITIS B SURFACE AG IA: CPT

## 2018-05-23 PROCEDURE — 83970 ASSAY OF PARATHORMONE: CPT

## 2018-05-23 PROCEDURE — 86706 HEP B SURFACE ANTIBODY: CPT

## 2018-05-23 PROCEDURE — 82330 ASSAY OF CALCIUM: CPT

## 2018-05-23 PROCEDURE — 84540 ASSAY OF URINE/UREA-N: CPT

## 2018-05-23 PROCEDURE — 84166 PROTEIN E-PHORESIS/URINE/CSF: CPT

## 2018-05-23 PROCEDURE — 97530 THERAPEUTIC ACTIVITIES: CPT

## 2018-05-23 PROCEDURE — 6370000000 HC RX 637 (ALT 250 FOR IP): Performed by: NURSE PRACTITIONER

## 2018-05-23 PROCEDURE — 82607 VITAMIN B-12: CPT

## 2018-05-23 PROCEDURE — 36415 COLL VENOUS BLD VENIPUNCTURE: CPT

## 2018-05-23 PROCEDURE — 83550 IRON BINDING TEST: CPT

## 2018-05-23 PROCEDURE — 84443 ASSAY THYROID STIM HORMONE: CPT

## 2018-05-23 PROCEDURE — 76705 ECHO EXAM OF ABDOMEN: CPT

## 2018-05-23 PROCEDURE — 82728 ASSAY OF FERRITIN: CPT

## 2018-05-23 PROCEDURE — 85027 COMPLETE CBC AUTOMATED: CPT

## 2018-05-23 PROCEDURE — 86060 ANTISTREPTOLYSIN O TITER: CPT

## 2018-05-23 PROCEDURE — 80053 COMPREHEN METABOLIC PANEL: CPT

## 2018-05-23 PROCEDURE — 97535 SELF CARE MNGMENT TRAINING: CPT

## 2018-05-23 PROCEDURE — 83735 ASSAY OF MAGNESIUM: CPT

## 2018-05-23 RX ORDER — ALLOPURINOL 100 MG/1
100 TABLET ORAL DAILY
Status: DISCONTINUED | OUTPATIENT
Start: 2018-05-23 | End: 2018-05-25 | Stop reason: HOSPADM

## 2018-05-23 RX ORDER — SENNA AND DOCUSATE SODIUM 50; 8.6 MG/1; MG/1
2 TABLET, FILM COATED ORAL 2 TIMES DAILY
Status: DISCONTINUED | OUTPATIENT
Start: 2018-05-23 | End: 2018-05-25 | Stop reason: HOSPADM

## 2018-05-23 RX ORDER — POLYETHYLENE GLYCOL 3350 17 G/17G
17 POWDER, FOR SOLUTION ORAL ONCE
Status: COMPLETED | OUTPATIENT
Start: 2018-05-23 | End: 2018-05-23

## 2018-05-23 RX ORDER — MAGNESIUM SULFATE IN WATER 40 MG/ML
2 INJECTION, SOLUTION INTRAVENOUS ONCE
Status: COMPLETED | OUTPATIENT
Start: 2018-05-23 | End: 2018-05-23

## 2018-05-23 RX ADMIN — SODIUM CHLORIDE: 9 INJECTION, SOLUTION INTRAVENOUS at 13:34

## 2018-05-23 RX ADMIN — CILOSTAZOL 100 MG: 100 TABLET ORAL at 09:34

## 2018-05-23 RX ADMIN — INSULIN HUMAN 70 UNITS: 100 INJECTION, SOLUTION PARENTERAL at 13:26

## 2018-05-23 RX ADMIN — PREGABALIN 200 MG: 75 CAPSULE ORAL at 13:19

## 2018-05-23 RX ADMIN — CLONIDINE HYDROCHLORIDE 0.2 MG: 0.2 TABLET ORAL at 05:56

## 2018-05-23 RX ADMIN — PIPERACILLIN SODIUM AND TAZOBACTAM SODIUM 3.38 G: 3; .375 INJECTION, POWDER, LYOPHILIZED, FOR SOLUTION INTRAVENOUS at 09:47

## 2018-05-23 RX ADMIN — INSULIN LISPRO 3 UNITS: 100 INJECTION, SOLUTION INTRAVENOUS; SUBCUTANEOUS at 09:35

## 2018-05-23 RX ADMIN — ALLOPURINOL 100 MG: 100 TABLET ORAL at 19:31

## 2018-05-23 RX ADMIN — PREGABALIN 200 MG: 75 CAPSULE ORAL at 22:27

## 2018-05-23 RX ADMIN — STANDARDIZED SENNA CONCENTRATE AND DOCUSATE SODIUM 2 TABLET: 8.6; 5 TABLET, FILM COATED ORAL at 13:20

## 2018-05-23 RX ADMIN — PIPERACILLIN SODIUM AND TAZOBACTAM SODIUM 3.38 G: 3; .375 INJECTION, POWDER, LYOPHILIZED, FOR SOLUTION INTRAVENOUS at 18:20

## 2018-05-23 RX ADMIN — OXYCODONE HYDROCHLORIDE 10 MG: 10 TABLET, FILM COATED, EXTENDED RELEASE ORAL at 09:44

## 2018-05-23 RX ADMIN — CLINDAMYCIN PHOSPHATE 900 MG: 900 INJECTION INTRAVENOUS at 18:17

## 2018-05-23 RX ADMIN — CLONIDINE HYDROCHLORIDE 0.2 MG: 0.2 TABLET ORAL at 22:16

## 2018-05-23 RX ADMIN — INSULIN LISPRO 5 UNITS: 100 INJECTION, SOLUTION INTRAVENOUS; SUBCUTANEOUS at 21:18

## 2018-05-23 RX ADMIN — INSULIN GLARGINE 15 UNITS: 100 INJECTION, SOLUTION SUBCUTANEOUS at 21:18

## 2018-05-23 RX ADMIN — VITAMIN D, TAB 1000IU (100/BT) 5000 UNITS: 25 TAB at 19:31

## 2018-05-23 RX ADMIN — PANTOPRAZOLE SODIUM 40 MG: 40 TABLET, DELAYED RELEASE ORAL at 05:57

## 2018-05-23 RX ADMIN — STANDARDIZED SENNA CONCENTRATE AND DOCUSATE SODIUM 2 TABLET: 8.6; 5 TABLET, FILM COATED ORAL at 21:12

## 2018-05-23 RX ADMIN — DULOXETINE HYDROCHLORIDE 120 MG: 60 CAPSULE, DELAYED RELEASE ORAL at 09:33

## 2018-05-23 RX ADMIN — METOPROLOL TARTRATE 100 MG: 50 TABLET ORAL at 01:50

## 2018-05-23 RX ADMIN — ATORVASTATIN CALCIUM 10 MG: 10 TABLET, FILM COATED ORAL at 09:34

## 2018-05-23 RX ADMIN — PIPERACILLIN SODIUM AND TAZOBACTAM SODIUM 3.38 G: 3; .375 INJECTION, POWDER, LYOPHILIZED, FOR SOLUTION INTRAVENOUS at 01:47

## 2018-05-23 RX ADMIN — INSULIN LISPRO 15 UNITS: 100 INJECTION, SOLUTION INTRAVENOUS; SUBCUTANEOUS at 16:51

## 2018-05-23 RX ADMIN — CLINDAMYCIN PHOSPHATE 900 MG: 900 INJECTION INTRAVENOUS at 01:44

## 2018-05-23 RX ADMIN — OXYCODONE HYDROCHLORIDE 10 MG: 10 TABLET, FILM COATED, EXTENDED RELEASE ORAL at 21:12

## 2018-05-23 RX ADMIN — METOPROLOL TARTRATE 100 MG: 50 TABLET ORAL at 09:34

## 2018-05-23 RX ADMIN — INSULIN HUMAN 80 UNITS: 100 INJECTION, SOLUTION PARENTERAL at 16:50

## 2018-05-23 RX ADMIN — METOPROLOL TARTRATE 100 MG: 50 TABLET ORAL at 21:12

## 2018-05-23 RX ADMIN — CLONIDINE HYDROCHLORIDE 0.2 MG: 0.2 TABLET ORAL at 13:20

## 2018-05-23 RX ADMIN — CILOSTAZOL 100 MG: 100 TABLET ORAL at 22:16

## 2018-05-23 RX ADMIN — TIZANIDINE 4 MG: 4 TABLET ORAL at 09:34

## 2018-05-23 RX ADMIN — POLYETHYLENE GLYCOL 3350 17 G: 17 POWDER, FOR SOLUTION ORAL at 13:25

## 2018-05-23 RX ADMIN — CLINDAMYCIN PHOSPHATE 900 MG: 900 INJECTION INTRAVENOUS at 09:38

## 2018-05-23 RX ADMIN — MAGNESIUM SULFATE HEPTAHYDRATE 2 G: 40 INJECTION, SOLUTION INTRAVENOUS at 16:54

## 2018-05-23 RX ADMIN — TIZANIDINE 4 MG: 4 TABLET ORAL at 13:19

## 2018-05-23 RX ADMIN — Medication 10 ML: at 02:10

## 2018-05-23 RX ADMIN — Medication 400 MG: at 13:19

## 2018-05-23 RX ADMIN — AMLODIPINE BESYLATE 10 MG: 10 TABLET ORAL at 09:33

## 2018-05-23 RX ADMIN — PREGABALIN 200 MG: 75 CAPSULE ORAL at 05:56

## 2018-05-23 RX ADMIN — INSULIN LISPRO 15 UNITS: 100 INJECTION, SOLUTION INTRAVENOUS; SUBCUTANEOUS at 13:23

## 2018-05-23 RX ADMIN — TIZANIDINE 4 MG: 4 TABLET ORAL at 21:12

## 2018-05-23 ASSESSMENT — PAIN DESCRIPTION - PROGRESSION
CLINICAL_PROGRESSION: GRADUALLY IMPROVING
CLINICAL_PROGRESSION: GRADUALLY IMPROVING

## 2018-05-23 ASSESSMENT — PAIN DESCRIPTION - ORIENTATION
ORIENTATION: RIGHT
ORIENTATION: RIGHT

## 2018-05-23 ASSESSMENT — PAIN SCALES - GENERAL
PAINLEVEL_OUTOF10: 6
PAINLEVEL_OUTOF10: 2

## 2018-05-23 ASSESSMENT — PAIN DESCRIPTION - FREQUENCY
FREQUENCY: INTERMITTENT
FREQUENCY: INTERMITTENT

## 2018-05-23 ASSESSMENT — PAIN DESCRIPTION - PAIN TYPE
TYPE: ACUTE PAIN
TYPE: ACUTE PAIN

## 2018-05-23 ASSESSMENT — PAIN DESCRIPTION - DESCRIPTORS
DESCRIPTORS: ACHING
DESCRIPTORS: OTHER (COMMENT)

## 2018-05-23 ASSESSMENT — PAIN DESCRIPTION - LOCATION
LOCATION: LEG
LOCATION: LEG

## 2018-05-24 ENCOUNTER — APPOINTMENT (OUTPATIENT)
Dept: GENERAL RADIOLOGY | Age: 63
DRG: 617 | End: 2018-05-24
Payer: MEDICARE

## 2018-05-24 ENCOUNTER — ANESTHESIA (OUTPATIENT)
Dept: OPERATING ROOM | Age: 63
DRG: 617 | End: 2018-05-24
Payer: MEDICARE

## 2018-05-24 VITALS
SYSTOLIC BLOOD PRESSURE: 185 MMHG | OXYGEN SATURATION: 97 % | RESPIRATION RATE: 18 BRPM | DIASTOLIC BLOOD PRESSURE: 81 MMHG

## 2018-05-24 LAB
ABO/RH: NORMAL
ALBUMIN SERPL-MCNC: 2.6 G/DL (ref 3.5–5.2)
ALP BLD-CCNC: 146 U/L (ref 40–129)
ALT SERPL-CCNC: 74 U/L (ref 0–40)
ANION GAP SERPL CALCULATED.3IONS-SCNC: 14 MMOL/L (ref 7–16)
ANTIBODY SCREEN: NORMAL
AST SERPL-CCNC: 44 U/L (ref 0–39)
BILIRUB SERPL-MCNC: 0.7 MG/DL (ref 0–1.2)
BLOOD BANK DISPENSE STATUS: NORMAL
BLOOD BANK DISPENSE STATUS: NORMAL
BLOOD BANK PRODUCT CODE: NORMAL
BLOOD BANK PRODUCT CODE: NORMAL
BPU ID: NORMAL
BPU ID: NORMAL
BUN BLDV-MCNC: 37 MG/DL (ref 8–23)
CALCIUM IONIZED: 1.32 MMOL/L (ref 1.15–1.33)
CALCIUM SERPL-MCNC: 8.9 MG/DL (ref 8.6–10.2)
CHLORIDE BLD-SCNC: 100 MMOL/L (ref 98–107)
CO2: 20 MMOL/L (ref 22–29)
CREAT SERPL-MCNC: 2.4 MG/DL (ref 0.7–1.2)
DESCRIPTION BLOOD BANK: NORMAL
DESCRIPTION BLOOD BANK: NORMAL
GFR AFRICAN AMERICAN: 34
GFR NON-AFRICAN AMERICAN: 34 ML/MIN/1.73
GLUCOSE BLD-MCNC: 124 MG/DL (ref 74–109)
HCT VFR BLD CALC: 28 % (ref 37–54)
HEMOGLOBIN: 9.2 G/DL (ref 12.5–16.5)
INR BLD: 2.8
LV EF: 65 %
LVEF MODALITY: NORMAL
MAGNESIUM: 1.9 MG/DL (ref 1.6–2.6)
MCH RBC QN AUTO: 26.1 PG (ref 26–35)
MCHC RBC AUTO-ENTMCNC: 32.9 % (ref 32–34.5)
MCV RBC AUTO: 79.5 FL (ref 80–99.9)
METER GLUCOSE: 112 MG/DL (ref 70–110)
METER GLUCOSE: 127 MG/DL (ref 70–110)
METER GLUCOSE: 210 MG/DL (ref 70–110)
METER GLUCOSE: 221 MG/DL (ref 70–110)
METER GLUCOSE: 222 MG/DL (ref 70–110)
PDW BLD-RTO: 13.8 FL (ref 11.5–15)
PHOSPHORUS: 3.6 MG/DL (ref 2.5–4.5)
PLATELET # BLD: 184 E9/L (ref 130–450)
PMV BLD AUTO: 12.7 FL (ref 7–12)
POTASSIUM SERPL-SCNC: 4.6 MMOL/L (ref 3.5–5)
PROSTATE SPECIFIC ANTIGEN: 0.41 NG/ML (ref 0–4)
PROTHROMBIN TIME: 32.1 SEC (ref 9.3–12.4)
RBC # BLD: 3.52 E12/L (ref 3.8–5.8)
SODIUM BLD-SCNC: 134 MMOL/L (ref 132–146)
TOTAL PROTEIN: 7.3 G/DL (ref 6.4–8.3)
VANCOMYCIN RANDOM: <4 MCG/ML (ref 5–40)
WBC # BLD: 17.5 E9/L (ref 4.5–11.5)

## 2018-05-24 PROCEDURE — 6360000002 HC RX W HCPCS: Performed by: INTERNAL MEDICINE

## 2018-05-24 PROCEDURE — 80053 COMPREHEN METABOLIC PANEL: CPT

## 2018-05-24 PROCEDURE — 36415 COLL VENOUS BLD VENIPUNCTURE: CPT

## 2018-05-24 PROCEDURE — 6370000000 HC RX 637 (ALT 250 FOR IP): Performed by: INTERNAL MEDICINE

## 2018-05-24 PROCEDURE — 86900 BLOOD TYPING SEROLOGIC ABO: CPT

## 2018-05-24 PROCEDURE — 3700000001 HC ADD 15 MINUTES (ANESTHESIA): Performed by: PODIATRIST

## 2018-05-24 PROCEDURE — 6360000002 HC RX W HCPCS: Performed by: REGISTERED NURSE

## 2018-05-24 PROCEDURE — 7100000010 HC PHASE II RECOVERY - FIRST 15 MIN: Performed by: PODIATRIST

## 2018-05-24 PROCEDURE — 86850 RBC ANTIBODY SCREEN: CPT

## 2018-05-24 PROCEDURE — 85027 COMPLETE CBC AUTOMATED: CPT

## 2018-05-24 PROCEDURE — 51798 US URINE CAPACITY MEASURE: CPT

## 2018-05-24 PROCEDURE — 93306 TTE W/DOPPLER COMPLETE: CPT

## 2018-05-24 PROCEDURE — 6370000000 HC RX 637 (ALT 250 FOR IP): Performed by: FAMILY MEDICINE

## 2018-05-24 PROCEDURE — 87205 SMEAR GRAM STAIN: CPT

## 2018-05-24 PROCEDURE — 88311 DECALCIFY TISSUE: CPT

## 2018-05-24 PROCEDURE — 83735 ASSAY OF MAGNESIUM: CPT

## 2018-05-24 PROCEDURE — 87070 CULTURE OTHR SPECIMN AEROBIC: CPT

## 2018-05-24 PROCEDURE — 7100000011 HC PHASE II RECOVERY - ADDTL 15 MIN: Performed by: PODIATRIST

## 2018-05-24 PROCEDURE — 87077 CULTURE AEROBIC IDENTIFY: CPT

## 2018-05-24 PROCEDURE — 3600000002 HC SURGERY LEVEL 2 BASE: Performed by: PODIATRIST

## 2018-05-24 PROCEDURE — P9059 PLASMA, FRZ BETWEEN 8-24HOUR: HCPCS

## 2018-05-24 PROCEDURE — 2580000003 HC RX 258: Performed by: REGISTERED NURSE

## 2018-05-24 PROCEDURE — 2580000003 HC RX 258: Performed by: INTERNAL MEDICINE

## 2018-05-24 PROCEDURE — 2709999900 HC NON-CHARGEABLE SUPPLY: Performed by: PODIATRIST

## 2018-05-24 PROCEDURE — 2500000003 HC RX 250 WO HCPCS: Performed by: SPECIALIST

## 2018-05-24 PROCEDURE — 84100 ASSAY OF PHOSPHORUS: CPT

## 2018-05-24 PROCEDURE — G0103 PSA SCREENING: HCPCS

## 2018-05-24 PROCEDURE — 1200000000 HC SEMI PRIVATE

## 2018-05-24 PROCEDURE — 87186 SC STD MICRODIL/AGAR DIL: CPT

## 2018-05-24 PROCEDURE — 88304 TISSUE EXAM BY PATHOLOGIST: CPT

## 2018-05-24 PROCEDURE — 82962 GLUCOSE BLOOD TEST: CPT

## 2018-05-24 PROCEDURE — 36430 TRANSFUSION BLD/BLD COMPNT: CPT

## 2018-05-24 PROCEDURE — 2500000003 HC RX 250 WO HCPCS: Performed by: PODIATRIST

## 2018-05-24 PROCEDURE — 87075 CULTR BACTERIA EXCEPT BLOOD: CPT

## 2018-05-24 PROCEDURE — 86901 BLOOD TYPING SEROLOGIC RH(D): CPT

## 2018-05-24 PROCEDURE — 0J9Q0ZZ DRAINAGE OF RIGHT FOOT SUBCUTANEOUS TISSUE AND FASCIA, OPEN APPROACH: ICD-10-PCS | Performed by: PODIATRIST

## 2018-05-24 PROCEDURE — 85610 PROTHROMBIN TIME: CPT

## 2018-05-24 PROCEDURE — 82330 ASSAY OF CALCIUM: CPT

## 2018-05-24 PROCEDURE — 0Y6M0ZD DETACHMENT AT RIGHT FOOT, PARTIAL 4TH RAY, OPEN APPROACH: ICD-10-PCS | Performed by: PODIATRIST

## 2018-05-24 PROCEDURE — 3700000000 HC ANESTHESIA ATTENDED CARE: Performed by: PODIATRIST

## 2018-05-24 PROCEDURE — 6370000000 HC RX 637 (ALT 250 FOR IP): Performed by: NURSE PRACTITIONER

## 2018-05-24 PROCEDURE — 6360000002 HC RX W HCPCS: Performed by: NURSE ANESTHETIST, CERTIFIED REGISTERED

## 2018-05-24 PROCEDURE — 3600000012 HC SURGERY LEVEL 2 ADDTL 15MIN: Performed by: PODIATRIST

## 2018-05-24 PROCEDURE — 2580000003 HC RX 258: Performed by: PODIATRIST

## 2018-05-24 PROCEDURE — 80202 ASSAY OF VANCOMYCIN: CPT

## 2018-05-24 PROCEDURE — 73630 X-RAY EXAM OF FOOT: CPT

## 2018-05-24 RX ORDER — SODIUM CHLORIDE 0.9 % (FLUSH) 0.9 %
10 SYRINGE (ML) INJECTION PRN
Status: DISCONTINUED | OUTPATIENT
Start: 2018-05-24 | End: 2018-05-25 | Stop reason: HOSPADM

## 2018-05-24 RX ORDER — PROPOFOL 10 MG/ML
INJECTION, EMULSION INTRAVENOUS PRN
Status: DISCONTINUED | OUTPATIENT
Start: 2018-05-24 | End: 2018-05-24 | Stop reason: SDUPTHER

## 2018-05-24 RX ORDER — HEPARIN SODIUM (PORCINE) LOCK FLUSH IV SOLN 100 UNIT/ML 100 UNIT/ML
3 SOLUTION INTRAVENOUS PRN
Status: DISCONTINUED | OUTPATIENT
Start: 2018-05-24 | End: 2018-05-25 | Stop reason: HOSPADM

## 2018-05-24 RX ORDER — LIDOCAINE HYDROCHLORIDE 10 MG/ML
5 INJECTION, SOLUTION EPIDURAL; INFILTRATION; INTRACAUDAL; PERINEURAL ONCE
Status: DISCONTINUED | OUTPATIENT
Start: 2018-05-24 | End: 2018-05-25 | Stop reason: HOSPADM

## 2018-05-24 RX ORDER — FENTANYL CITRATE 50 UG/ML
50 INJECTION, SOLUTION INTRAMUSCULAR; INTRAVENOUS EVERY 5 MIN PRN
Status: DISCONTINUED | OUTPATIENT
Start: 2018-05-24 | End: 2018-05-24 | Stop reason: HOSPADM

## 2018-05-24 RX ORDER — BUPIVACAINE HYDROCHLORIDE 5 MG/ML
INJECTION, SOLUTION EPIDURAL; INTRACAUDAL PRN
Status: DISCONTINUED | OUTPATIENT
Start: 2018-05-24 | End: 2018-05-24 | Stop reason: HOSPADM

## 2018-05-24 RX ORDER — 0.9 % SODIUM CHLORIDE 0.9 %
250 INTRAVENOUS SOLUTION INTRAVENOUS ONCE
Status: COMPLETED | OUTPATIENT
Start: 2018-05-24 | End: 2018-05-25

## 2018-05-24 RX ORDER — HEPARIN SODIUM (PORCINE) LOCK FLUSH IV SOLN 100 UNIT/ML 100 UNIT/ML
3 SOLUTION INTRAVENOUS EVERY 12 HOURS SCHEDULED
Status: DISCONTINUED | OUTPATIENT
Start: 2018-05-24 | End: 2018-05-25 | Stop reason: HOSPADM

## 2018-05-24 RX ADMIN — Medication 10 ML: at 09:26

## 2018-05-24 RX ADMIN — CLINDAMYCIN PHOSPHATE 900 MG: 900 INJECTION INTRAVENOUS at 18:44

## 2018-05-24 RX ADMIN — TIZANIDINE 4 MG: 4 TABLET ORAL at 21:43

## 2018-05-24 RX ADMIN — METOPROLOL TARTRATE 100 MG: 50 TABLET ORAL at 21:33

## 2018-05-24 RX ADMIN — SODIUM CHLORIDE 100 ML: 9 INJECTION, SOLUTION INTRAVENOUS at 13:22

## 2018-05-24 RX ADMIN — STANDARDIZED SENNA CONCENTRATE AND DOCUSATE SODIUM 2 TABLET: 8.6; 5 TABLET, FILM COATED ORAL at 21:33

## 2018-05-24 RX ADMIN — Medication 10 ML: at 21:36

## 2018-05-24 RX ADMIN — CLONIDINE HYDROCHLORIDE 0.2 MG: 0.2 TABLET ORAL at 21:33

## 2018-05-24 RX ADMIN — PROPOFOL 300 MG: 10 INJECTION, EMULSION INTRAVENOUS at 18:05

## 2018-05-24 RX ADMIN — PREGABALIN 200 MG: 75 CAPSULE ORAL at 21:33

## 2018-05-24 RX ADMIN — INSULIN LISPRO 3 UNITS: 100 INJECTION, SOLUTION INTRAVENOUS; SUBCUTANEOUS at 21:34

## 2018-05-24 RX ADMIN — CLINDAMYCIN PHOSPHATE 900 MG: 900 INJECTION INTRAVENOUS at 10:34

## 2018-05-24 RX ADMIN — INSULIN GLARGINE 15 UNITS: 100 INJECTION, SOLUTION SUBCUTANEOUS at 21:33

## 2018-05-24 RX ADMIN — OXYCODONE HYDROCHLORIDE AND ACETAMINOPHEN 1 TABLET: 5; 325 TABLET ORAL at 21:43

## 2018-05-24 RX ADMIN — OXYCODONE HYDROCHLORIDE 10 MG: 10 TABLET, FILM COATED, EXTENDED RELEASE ORAL at 20:20

## 2018-05-24 RX ADMIN — CILOSTAZOL 100 MG: 100 TABLET ORAL at 21:33

## 2018-05-24 RX ADMIN — SODIUM CHLORIDE: 9 INJECTION, SOLUTION INTRAVENOUS at 05:10

## 2018-05-24 RX ADMIN — PIPERACILLIN SODIUM AND TAZOBACTAM SODIUM 3.38 G: 3; .375 INJECTION, POWDER, LYOPHILIZED, FOR SOLUTION INTRAVENOUS at 01:37

## 2018-05-24 RX ADMIN — CLINDAMYCIN PHOSPHATE 900 MG: 900 INJECTION INTRAVENOUS at 01:37

## 2018-05-24 RX ADMIN — PIPERACILLIN SODIUM AND TAZOBACTAM SODIUM 3.38 G: 3; .375 INJECTION, POWDER, LYOPHILIZED, FOR SOLUTION INTRAVENOUS at 09:26

## 2018-05-24 RX ADMIN — VANCOMYCIN HYDROCHLORIDE 2000 MG: 10 INJECTION, POWDER, LYOPHILIZED, FOR SOLUTION INTRAVENOUS at 14:30

## 2018-05-24 RX ADMIN — METOPROLOL TARTRATE 100 MG: 50 TABLET ORAL at 09:42

## 2018-05-24 ASSESSMENT — PULMONARY FUNCTION TESTS
PIF_VALUE: 0
PIF_VALUE: 22
PIF_VALUE: 0
PIF_VALUE: 0
PIF_VALUE: 1
PIF_VALUE: 0
PIF_VALUE: 22
PIF_VALUE: 0
PIF_VALUE: 2
PIF_VALUE: 0
PIF_VALUE: 0
PIF_VALUE: 3
PIF_VALUE: 0

## 2018-05-24 ASSESSMENT — PAIN DESCRIPTION - DESCRIPTORS
DESCRIPTORS: SQUEEZING;TIGHTNESS
DESCRIPTORS: SQUEEZING;TIGHTNESS

## 2018-05-24 ASSESSMENT — PAIN DESCRIPTION - PAIN TYPE
TYPE: SURGICAL PAIN
TYPE: SURGICAL PAIN

## 2018-05-24 ASSESSMENT — PAIN SCALES - GENERAL
PAINLEVEL_OUTOF10: 8
PAINLEVEL_OUTOF10: 9
PAINLEVEL_OUTOF10: 0
PAINLEVEL_OUTOF10: 10
PAINLEVEL_OUTOF10: 0
PAINLEVEL_OUTOF10: 0

## 2018-05-24 ASSESSMENT — PAIN DESCRIPTION - LOCATION
LOCATION: FOOT;LEG
LOCATION: FOOT;LEG

## 2018-05-24 ASSESSMENT — PAIN DESCRIPTION - FREQUENCY
FREQUENCY: CONTINUOUS
FREQUENCY: CONTINUOUS

## 2018-05-24 ASSESSMENT — PAIN DESCRIPTION - ONSET
ONSET: ON-GOING
ONSET: ON-GOING

## 2018-05-24 ASSESSMENT — PAIN DESCRIPTION - ORIENTATION
ORIENTATION: RIGHT
ORIENTATION: RIGHT

## 2018-05-25 ENCOUNTER — HOSPITAL ENCOUNTER (OUTPATIENT)
Age: 63
Discharge: HOME | End: 2018-05-25
Payer: MEDICARE

## 2018-05-25 ENCOUNTER — HOSPITAL ENCOUNTER (INPATIENT)
Dept: CARDIAC CATH/INVASIVE PROCEDURES | Age: 63
LOS: 3 days | Discharge: NURSING, REHAB OR LONG-TERM CARE | DRG: 623 | End: 2018-05-30
Attending: SURGERY | Admitting: INTERNAL MEDICINE
Payer: MEDICARE

## 2018-05-25 VITALS
HEART RATE: 69 BPM | WEIGHT: 315 LBS | RESPIRATION RATE: 18 BRPM | OXYGEN SATURATION: 95 % | TEMPERATURE: 98.5 F | BODY MASS INDEX: 40.43 KG/M2 | DIASTOLIC BLOOD PRESSURE: 72 MMHG | SYSTOLIC BLOOD PRESSURE: 137 MMHG | HEIGHT: 74 IN

## 2018-05-25 DIAGNOSIS — I73.9 PVD (PERIPHERAL VASCULAR DISEASE) (HCC): ICD-10-CM

## 2018-05-25 PROBLEM — I70.45 ATHEROSCLEROSIS OF AUTOLOGOUS VEIN BYPASS GRAFT OF EXTREMITY WITH ULCERATION (HCC): Chronic | Status: RESOLVED | Noted: 2018-05-22 | Resolved: 2018-05-25

## 2018-05-25 PROBLEM — I70.65: Status: ACTIVE | Noted: 2018-05-21

## 2018-05-25 LAB
ADDENDUM ELECTROPHORESIS URINE RANDOM: NORMAL
ALBUMIN SERPL-MCNC: 2.6 G/DL (ref 3.5–4.7)
ALPHA-1-GLOBULIN: 0.5 G/DL (ref 0.2–0.4)
ALPHA-2-GLOBULIN: 1.2 G/FL (ref 0.5–1)
ANAEROBIC CULTURE: ABNORMAL
ANAEROBIC CULTURE: ABNORMAL
ANION GAP SERPL CALCULATED.3IONS-SCNC: 12 MMOL/L (ref 7–16)
APTT: 36.1 SEC (ref 24.5–35.1)
BASOPHILS ABSOLUTE: 0.06 E9/L (ref 0–0.2)
BASOPHILS RELATIVE PERCENT: 0.5 % (ref 0–2)
BETA GLOBULIN: 1.1 G/DL (ref 0.8–1.3)
BUN BLDV-MCNC: 29 MG/DL (ref 8–23)
CALCIUM IONIZED: 1.34 MMOL/L (ref 1.15–1.33)
CALCIUM SERPL-MCNC: 9 MG/DL (ref 8.6–10.2)
CHLORIDE BLD-SCNC: 101 MMOL/L (ref 98–107)
CO2: 22 MMOL/L (ref 22–29)
CREAT SERPL-MCNC: 2 MG/DL (ref 0.7–1.2)
ELECTROPHORESIS: ABNORMAL
EOSINOPHILS ABSOLUTE: 0.19 E9/L (ref 0.05–0.5)
EOSINOPHILS RELATIVE PERCENT: 1.5 % (ref 0–6)
GAMMA GLOBULIN: 1.8 G/DL (ref 0.7–1.6)
GFR AFRICAN AMERICAN: 41
GFR NON-AFRICAN AMERICAN: 41 ML/MIN/1.73
GLUCOSE BLD-MCNC: 290 MG/DL (ref 74–109)
GRAM STAIN ORDERABLE: NORMAL
HCT VFR BLD CALC: 28.9 % (ref 37–54)
HEMOGLOBIN: 9.3 G/DL (ref 12.5–16.5)
IMMATURE GRANULOCYTES #: 0.09 E9/L
IMMATURE GRANULOCYTES %: 0.7 % (ref 0–5)
IMMUNOFIXATION URINE: NORMAL
INR BLD: 2
LYMPHOCYTES ABSOLUTE: 1.7 E9/L (ref 1.5–4)
LYMPHOCYTES RELATIVE PERCENT: 13 % (ref 20–42)
MAGNESIUM: 1.8 MG/DL (ref 1.6–2.6)
MCH RBC QN AUTO: 26.4 PG (ref 26–35)
MCHC RBC AUTO-ENTMCNC: 32.2 % (ref 32–34.5)
MCV RBC AUTO: 82.1 FL (ref 80–99.9)
METER GLUCOSE: 261 MG/DL (ref 70–110)
METER GLUCOSE: 269 MG/DL (ref 70–110)
METER GLUCOSE: 295 MG/DL (ref 70–110)
MONOCYTES ABSOLUTE: 1.19 E9/L (ref 0.1–0.95)
MONOCYTES RELATIVE PERCENT: 9.1 % (ref 2–12)
NEUTROPHILS ABSOLUTE: 9.82 E9/L (ref 1.8–7.3)
NEUTROPHILS RELATIVE PERCENT: 75.2 % (ref 43–80)
ORGANISM: ABNORMAL
PDW BLD-RTO: 14 FL (ref 11.5–15)
PHOSPHORUS: 2.9 MG/DL (ref 2.5–4.5)
PLATELET # BLD: 179 E9/L (ref 130–450)
PMV BLD AUTO: 12.5 FL (ref 7–12)
POTASSIUM SERPL-SCNC: 5.2 MMOL/L (ref 3.5–5)
PROTHROMBIN TIME: 22.9 SEC (ref 9.3–12.4)
RBC # BLD: 3.52 E12/L (ref 3.8–5.8)
SODIUM BLD-SCNC: 135 MMOL/L (ref 132–146)
URINE CULTURE, ROUTINE: NORMAL
VANCOMYCIN RANDOM: 10.5 MCG/ML (ref 5–40)
WBC # BLD: 13.1 E9/L (ref 4.5–11.5)

## 2018-05-25 PROCEDURE — 6360000002 HC RX W HCPCS: Performed by: SPECIALIST

## 2018-05-25 PROCEDURE — A0426 ALS 1: HCPCS

## 2018-05-25 PROCEDURE — 83735 ASSAY OF MAGNESIUM: CPT

## 2018-05-25 PROCEDURE — C1769 GUIDE WIRE: HCPCS

## 2018-05-25 PROCEDURE — 36415 COLL VENOUS BLD VENIPUNCTURE: CPT

## 2018-05-25 PROCEDURE — 6370000000 HC RX 637 (ALT 250 FOR IP): Performed by: INTERNAL MEDICINE

## 2018-05-25 PROCEDURE — A0425 GROUND MILEAGE: HCPCS

## 2018-05-25 PROCEDURE — 2580000003 HC RX 258: Performed by: SURGERY

## 2018-05-25 PROCEDURE — 84100 ASSAY OF PHOSPHORUS: CPT

## 2018-05-25 PROCEDURE — 2500000003 HC RX 250 WO HCPCS: Performed by: SPECIALIST

## 2018-05-25 PROCEDURE — 6360000002 HC RX W HCPCS: Performed by: INTERNAL MEDICINE

## 2018-05-25 PROCEDURE — 82962 GLUCOSE BLOOD TEST: CPT

## 2018-05-25 PROCEDURE — 82330 ASSAY OF CALCIUM: CPT

## 2018-05-25 PROCEDURE — 85025 COMPLETE CBC W/AUTO DIFF WBC: CPT

## 2018-05-25 PROCEDURE — 36247 INS CATH ABD/L-EXT ART 3RD: CPT | Performed by: SURGERY

## 2018-05-25 PROCEDURE — G0269 OCCLUSIVE DEVICE IN VEIN ART: HCPCS | Performed by: SURGERY

## 2018-05-25 PROCEDURE — 75774 ARTERY X-RAY EACH VESSEL: CPT | Performed by: SURGERY

## 2018-05-25 PROCEDURE — 2580000003 HC RX 258: Performed by: INTERNAL MEDICINE

## 2018-05-25 PROCEDURE — 2500000003 HC RX 250 WO HCPCS

## 2018-05-25 PROCEDURE — 6360000002 HC RX W HCPCS

## 2018-05-25 PROCEDURE — 85730 THROMBOPLASTIN TIME PARTIAL: CPT

## 2018-05-25 PROCEDURE — C1760 CLOSURE DEV, VASC: HCPCS

## 2018-05-25 PROCEDURE — 75710 ARTERY X-RAYS ARM/LEG: CPT | Performed by: SURGERY

## 2018-05-25 PROCEDURE — 80048 BASIC METABOLIC PNL TOTAL CA: CPT

## 2018-05-25 PROCEDURE — 2580000003 HC RX 258: Performed by: SPECIALIST

## 2018-05-25 PROCEDURE — 6370000000 HC RX 637 (ALT 250 FOR IP): Performed by: SURGERY

## 2018-05-25 PROCEDURE — 85610 PROTHROMBIN TIME: CPT

## 2018-05-25 PROCEDURE — 80202 ASSAY OF VANCOMYCIN: CPT

## 2018-05-25 PROCEDURE — B41F1ZZ FLUOROSCOPY OF RIGHT LOWER EXTREMITY ARTERIES USING LOW OSMOLAR CONTRAST: ICD-10-PCS | Performed by: SURGERY

## 2018-05-25 PROCEDURE — C1887 CATHETER, GUIDING: HCPCS

## 2018-05-25 PROCEDURE — 2709999900 HC NON-CHARGEABLE SUPPLY

## 2018-05-25 RX ORDER — OXYCODONE HCL 10 MG/1
10 TABLET, FILM COATED, EXTENDED RELEASE ORAL EVERY 12 HOURS
Status: DISCONTINUED | OUTPATIENT
Start: 2018-05-25 | End: 2018-05-30 | Stop reason: HOSPADM

## 2018-05-25 RX ORDER — AMLODIPINE BESYLATE 10 MG/1
10 TABLET ORAL EVERY MORNING
Status: DISCONTINUED | OUTPATIENT
Start: 2018-05-26 | End: 2018-05-30 | Stop reason: HOSPADM

## 2018-05-25 RX ORDER — METOPROLOL TARTRATE 50 MG/1
100 TABLET, FILM COATED ORAL 2 TIMES DAILY
Status: DISCONTINUED | OUTPATIENT
Start: 2018-05-25 | End: 2018-05-30 | Stop reason: HOSPADM

## 2018-05-25 RX ORDER — LORAZEPAM 1 MG/1
1 TABLET ORAL NIGHTLY PRN
Status: DISCONTINUED | OUTPATIENT
Start: 2018-05-25 | End: 2018-05-30 | Stop reason: HOSPADM

## 2018-05-25 RX ORDER — PANTOPRAZOLE SODIUM 40 MG/1
40 TABLET, DELAYED RELEASE ORAL
Status: DISCONTINUED | OUTPATIENT
Start: 2018-05-26 | End: 2018-05-30 | Stop reason: HOSPADM

## 2018-05-25 RX ORDER — SODIUM CHLORIDE 9 MG/ML
INJECTION, SOLUTION INTRAVENOUS CONTINUOUS
Status: DISCONTINUED | OUTPATIENT
Start: 2018-05-25 | End: 2018-05-30 | Stop reason: HOSPADM

## 2018-05-25 RX ORDER — DULOXETIN HYDROCHLORIDE 60 MG/1
120 CAPSULE, DELAYED RELEASE ORAL EVERY MORNING
Status: DISCONTINUED | OUTPATIENT
Start: 2018-05-26 | End: 2018-05-30 | Stop reason: HOSPADM

## 2018-05-25 RX ORDER — DEXTROSE MONOHYDRATE 50 MG/ML
100 INJECTION, SOLUTION INTRAVENOUS PRN
Status: DISCONTINUED | OUTPATIENT
Start: 2018-05-25 | End: 2018-05-30 | Stop reason: HOSPADM

## 2018-05-25 RX ORDER — NICOTINE POLACRILEX 4 MG
15 LOZENGE BUCCAL PRN
Status: DISCONTINUED | OUTPATIENT
Start: 2018-05-25 | End: 2018-05-30 | Stop reason: HOSPADM

## 2018-05-25 RX ORDER — PREGABALIN 100 MG/1
200 CAPSULE ORAL 3 TIMES DAILY
Status: DISCONTINUED | OUTPATIENT
Start: 2018-05-25 | End: 2018-05-30 | Stop reason: HOSPADM

## 2018-05-25 RX ORDER — CLONIDINE HYDROCHLORIDE 0.2 MG/1
0.2 TABLET ORAL 3 TIMES DAILY
Status: DISCONTINUED | OUTPATIENT
Start: 2018-05-25 | End: 2018-05-30 | Stop reason: HOSPADM

## 2018-05-25 RX ORDER — SPIRONOLACTONE 25 MG/1
50 TABLET ORAL EVERY MORNING
Status: DISCONTINUED | OUTPATIENT
Start: 2018-05-26 | End: 2018-05-30 | Stop reason: HOSPADM

## 2018-05-25 RX ORDER — LISINOPRIL 10 MG/1
10 TABLET ORAL 2 TIMES DAILY
Status: DISCONTINUED | OUTPATIENT
Start: 2018-05-25 | End: 2018-05-25

## 2018-05-25 RX ORDER — SODIUM CHLORIDE 0.9 % (FLUSH) 0.9 %
10 SYRINGE (ML) INJECTION EVERY 12 HOURS SCHEDULED
Status: DISCONTINUED | OUTPATIENT
Start: 2018-05-25 | End: 2018-05-25

## 2018-05-25 RX ORDER — SODIUM CHLORIDE 0.9 % (FLUSH) 0.9 %
10 SYRINGE (ML) INJECTION PRN
Status: DISCONTINUED | OUTPATIENT
Start: 2018-05-25 | End: 2018-05-25

## 2018-05-25 RX ORDER — TIZANIDINE 4 MG/1
4 TABLET ORAL 3 TIMES DAILY
Status: DISCONTINUED | OUTPATIENT
Start: 2018-05-25 | End: 2018-05-30 | Stop reason: HOSPADM

## 2018-05-25 RX ORDER — ATORVASTATIN CALCIUM 10 MG/1
10 TABLET, FILM COATED ORAL DAILY
Status: DISCONTINUED | OUTPATIENT
Start: 2018-05-25 | End: 2018-05-30 | Stop reason: HOSPADM

## 2018-05-25 RX ORDER — LISINOPRIL 10 MG/1
10 TABLET ORAL DAILY
Status: DISCONTINUED | OUTPATIENT
Start: 2018-05-26 | End: 2018-05-30 | Stop reason: HOSPADM

## 2018-05-25 RX ORDER — DEXTROSE MONOHYDRATE 25 G/50ML
12.5 INJECTION, SOLUTION INTRAVENOUS PRN
Status: DISCONTINUED | OUTPATIENT
Start: 2018-05-25 | End: 2018-05-30 | Stop reason: HOSPADM

## 2018-05-25 RX ADMIN — CLINDAMYCIN PHOSPHATE 900 MG: 900 INJECTION INTRAVENOUS at 12:15

## 2018-05-25 RX ADMIN — INSULIN LISPRO 9 UNITS: 100 INJECTION, SOLUTION INTRAVENOUS; SUBCUTANEOUS at 09:15

## 2018-05-25 RX ADMIN — CLONIDINE HYDROCHLORIDE 0.2 MG: 0.2 TABLET ORAL at 21:00

## 2018-05-25 RX ADMIN — PREGABALIN 200 MG: 100 CAPSULE ORAL at 20:58

## 2018-05-25 RX ADMIN — METOPROLOL TARTRATE 100 MG: 50 TABLET ORAL at 20:59

## 2018-05-25 RX ADMIN — LINAGLIPTIN 5 MG: 5 TABLET, FILM COATED ORAL at 20:58

## 2018-05-25 RX ADMIN — PREGABALIN 200 MG: 75 CAPSULE ORAL at 06:22

## 2018-05-25 RX ADMIN — ATORVASTATIN CALCIUM 10 MG: 10 TABLET, FILM COATED ORAL at 21:00

## 2018-05-25 RX ADMIN — Medication 10 ML: at 09:21

## 2018-05-25 RX ADMIN — SODIUM CHLORIDE: 9 INJECTION, SOLUTION INTRAVENOUS at 08:55

## 2018-05-25 RX ADMIN — INSULIN HUMAN 80 UNITS: 100 INJECTION, SOLUTION PARENTERAL at 20:57

## 2018-05-25 RX ADMIN — TIZANIDINE 4 MG: 4 TABLET ORAL at 20:57

## 2018-05-25 RX ADMIN — OXYCODONE HYDROCHLORIDE 10 MG: 10 TABLET, FILM COATED, EXTENDED RELEASE ORAL at 09:15

## 2018-05-25 RX ADMIN — SODIUM CHLORIDE: 9 INJECTION, SOLUTION INTRAVENOUS at 14:33

## 2018-05-25 RX ADMIN — INSULIN HUMAN 14 UNITS: 100 INJECTION, SOLUTION PARENTERAL at 12:46

## 2018-05-25 RX ADMIN — CLINDAMYCIN PHOSPHATE 900 MG: 900 INJECTION INTRAVENOUS at 03:38

## 2018-05-25 RX ADMIN — INSULIN LISPRO 9 UNITS: 100 INJECTION, SOLUTION INTRAVENOUS; SUBCUTANEOUS at 12:42

## 2018-05-25 RX ADMIN — SODIUM CHLORIDE: 9 INJECTION, SOLUTION INTRAVENOUS at 19:01

## 2018-05-25 RX ADMIN — SODIUM CHLORIDE 3 G: 900 INJECTION INTRAVENOUS at 23:57

## 2018-05-25 RX ADMIN — PANTOPRAZOLE SODIUM 40 MG: 40 TABLET, DELAYED RELEASE ORAL at 06:22

## 2018-05-25 RX ADMIN — INSULIN HUMAN 12 UNITS: 100 INJECTION, SOLUTION PARENTERAL at 10:26

## 2018-05-25 RX ADMIN — VANCOMYCIN HYDROCHLORIDE 2000 MG: 10 INJECTION, POWDER, LYOPHILIZED, FOR SOLUTION INTRAVENOUS at 20:57

## 2018-05-25 RX ADMIN — OXYCODONE HYDROCHLORIDE 10 MG: 10 TABLET, FILM COATED, EXTENDED RELEASE ORAL at 18:59

## 2018-05-25 RX ADMIN — CLONIDINE HYDROCHLORIDE 0.2 MG: 0.2 TABLET ORAL at 06:22

## 2018-05-25 RX ADMIN — PIPERACILLIN SODIUM AND TAZOBACTAM SODIUM 3.38 G: 3; .375 INJECTION, POWDER, LYOPHILIZED, FOR SOLUTION INTRAVENOUS at 04:01

## 2018-05-25 ASSESSMENT — PAIN SCALES - GENERAL
PAINLEVEL_OUTOF10: 4
PAINLEVEL_OUTOF10: 10
PAINLEVEL_OUTOF10: 10
PAINLEVEL_OUTOF10: 6
PAINLEVEL_OUTOF10: 10
PAINLEVEL_OUTOF10: 8
PAINLEVEL_OUTOF10: 0

## 2018-05-25 ASSESSMENT — PAIN DESCRIPTION - ONSET: ONSET: ON-GOING

## 2018-05-25 ASSESSMENT — PAIN DESCRIPTION - PROGRESSION: CLINICAL_PROGRESSION: GRADUALLY WORSENING

## 2018-05-25 ASSESSMENT — PAIN DESCRIPTION - LOCATION: LOCATION: FOOT;LEG

## 2018-05-25 ASSESSMENT — PAIN DESCRIPTION - PAIN TYPE: TYPE: SURGICAL PAIN

## 2018-05-25 ASSESSMENT — PAIN DESCRIPTION - ORIENTATION: ORIENTATION: RIGHT

## 2018-05-25 ASSESSMENT — PAIN DESCRIPTION - FREQUENCY: FREQUENCY: CONTINUOUS

## 2018-05-25 ASSESSMENT — PAIN DESCRIPTION - DESCRIPTORS: DESCRIPTORS: DISCOMFORT;TIGHTNESS;SORE

## 2018-05-26 LAB
ABO/RH: NORMAL
ABO/RH: NORMAL
ANION GAP SERPL CALCULATED.3IONS-SCNC: 13 MMOL/L (ref 7–16)
ANION GAP SERPL CALCULATED.3IONS-SCNC: 13 MMOL/L (ref 7–16)
ANTIBODY SCREEN: NORMAL
BUN BLDV-MCNC: 20 MG/DL (ref 8–23)
BUN BLDV-MCNC: 20 MG/DL (ref 8–23)
CALCIUM SERPL-MCNC: 9.2 MG/DL (ref 8.6–10.2)
CALCIUM SERPL-MCNC: 9.2 MG/DL (ref 8.6–10.2)
CHLORIDE BLD-SCNC: 105 MMOL/L (ref 98–107)
CHLORIDE BLD-SCNC: 106 MMOL/L (ref 98–107)
CO2: 24 MMOL/L (ref 22–29)
CO2: 24 MMOL/L (ref 22–29)
CREAT SERPL-MCNC: 1.7 MG/DL (ref 0.7–1.2)
CREAT SERPL-MCNC: 1.7 MG/DL (ref 0.7–1.2)
GFR AFRICAN AMERICAN: 50
GFR AFRICAN AMERICAN: 50
GFR NON-AFRICAN AMERICAN: 50 ML/MIN/1.73
GFR NON-AFRICAN AMERICAN: 50 ML/MIN/1.73
GLUCOSE BLD-MCNC: 83 MG/DL (ref 74–109)
GLUCOSE BLD-MCNC: 84 MG/DL (ref 74–109)
HCT VFR BLD CALC: 28.7 % (ref 37–54)
HEMOGLOBIN: 9.3 G/DL (ref 12.5–16.5)
MCH RBC QN AUTO: 25.9 PG (ref 26–35)
MCHC RBC AUTO-ENTMCNC: 32.4 % (ref 32–34.5)
MCV RBC AUTO: 79.9 FL (ref 80–99.9)
METER GLUCOSE: 222 MG/DL (ref 70–110)
METER GLUCOSE: 263 MG/DL (ref 70–110)
METER GLUCOSE: 309 MG/DL (ref 70–110)
METER GLUCOSE: 97 MG/DL (ref 70–110)
PDW BLD-RTO: 13.6 FL (ref 11.5–15)
PLATELET # BLD: 197 E9/L (ref 130–450)
PMV BLD AUTO: 12.5 FL (ref 7–12)
POTASSIUM SERPL-SCNC: 4.3 MMOL/L (ref 3.5–5)
POTASSIUM SERPL-SCNC: 4.3 MMOL/L (ref 3.5–5)
RBC # BLD: 3.59 E12/L (ref 3.8–5.8)
SODIUM BLD-SCNC: 142 MMOL/L (ref 132–146)
SODIUM BLD-SCNC: 143 MMOL/L (ref 132–146)
WBC # BLD: 12.7 E9/L (ref 4.5–11.5)

## 2018-05-26 PROCEDURE — 2580000003 HC RX 258: Performed by: SPECIALIST

## 2018-05-26 PROCEDURE — 6370000000 HC RX 637 (ALT 250 FOR IP): Performed by: INTERNAL MEDICINE

## 2018-05-26 PROCEDURE — 36415 COLL VENOUS BLD VENIPUNCTURE: CPT

## 2018-05-26 PROCEDURE — 0LBV0ZZ EXCISION OF RIGHT FOOT TENDON, OPEN APPROACH: ICD-10-PCS | Performed by: PODIATRIST

## 2018-05-26 PROCEDURE — 86900 BLOOD TYPING SEROLOGIC ABO: CPT

## 2018-05-26 PROCEDURE — 6360000002 HC RX W HCPCS: Performed by: SPECIALIST

## 2018-05-26 PROCEDURE — 86850 RBC ANTIBODY SCREEN: CPT

## 2018-05-26 PROCEDURE — 2580000003 HC RX 258

## 2018-05-26 PROCEDURE — 2580000003 HC RX 258: Performed by: SURGERY

## 2018-05-26 PROCEDURE — 82962 GLUCOSE BLOOD TEST: CPT

## 2018-05-26 PROCEDURE — 80048 BASIC METABOLIC PNL TOTAL CA: CPT

## 2018-05-26 PROCEDURE — 99223 1ST HOSP IP/OBS HIGH 75: CPT | Performed by: INTERNAL MEDICINE

## 2018-05-26 PROCEDURE — 85027 COMPLETE CBC AUTOMATED: CPT

## 2018-05-26 PROCEDURE — 6370000000 HC RX 637 (ALT 250 FOR IP): Performed by: SURGERY

## 2018-05-26 PROCEDURE — 86901 BLOOD TYPING SEROLOGIC RH(D): CPT

## 2018-05-26 RX ORDER — SODIUM CHLORIDE 0.9 % (FLUSH) 0.9 %
SYRINGE (ML) INJECTION
Status: COMPLETED
Start: 2018-05-26 | End: 2018-05-26

## 2018-05-26 RX ORDER — INSULIN GLARGINE 100 [IU]/ML
12 INJECTION, SOLUTION SUBCUTANEOUS 2 TIMES DAILY
Status: DISCONTINUED | OUTPATIENT
Start: 2018-05-26 | End: 2018-05-27

## 2018-05-26 RX ADMIN — SODIUM CHLORIDE 3 G: 900 INJECTION INTRAVENOUS at 05:52

## 2018-05-26 RX ADMIN — OXYCODONE HYDROCHLORIDE 10 MG: 10 TABLET, FILM COATED, EXTENDED RELEASE ORAL at 18:56

## 2018-05-26 RX ADMIN — CLONIDINE HYDROCHLORIDE 0.2 MG: 0.2 TABLET ORAL at 21:25

## 2018-05-26 RX ADMIN — INSULIN LISPRO 9 UNITS: 100 INJECTION, SOLUTION INTRAVENOUS; SUBCUTANEOUS at 17:05

## 2018-05-26 RX ADMIN — SODIUM CHLORIDE: 9 INJECTION, SOLUTION INTRAVENOUS at 14:07

## 2018-05-26 RX ADMIN — PANTOPRAZOLE SODIUM 40 MG: 40 TABLET, DELAYED RELEASE ORAL at 05:52

## 2018-05-26 RX ADMIN — PREGABALIN 200 MG: 100 CAPSULE ORAL at 21:24

## 2018-05-26 RX ADMIN — TIZANIDINE 4 MG: 4 TABLET ORAL at 21:25

## 2018-05-26 RX ADMIN — METOPROLOL TARTRATE 100 MG: 50 TABLET ORAL at 21:25

## 2018-05-26 RX ADMIN — TIZANIDINE 4 MG: 4 TABLET ORAL at 15:01

## 2018-05-26 RX ADMIN — SODIUM CHLORIDE 3 G: 900 INJECTION INTRAVENOUS at 12:26

## 2018-05-26 RX ADMIN — SODIUM CHLORIDE 3 G: 900 INJECTION INTRAVENOUS at 17:54

## 2018-05-26 RX ADMIN — ATORVASTATIN CALCIUM 10 MG: 10 TABLET, FILM COATED ORAL at 21:25

## 2018-05-26 RX ADMIN — INSULIN GLARGINE 12 UNITS: 100 INJECTION, SOLUTION SUBCUTANEOUS at 21:24

## 2018-05-26 RX ADMIN — SPIRONOLACTONE 50 MG: 25 TABLET ORAL at 09:30

## 2018-05-26 RX ADMIN — INSULIN LISPRO 6 UNITS: 100 INJECTION, SOLUTION INTRAVENOUS; SUBCUTANEOUS at 21:24

## 2018-05-26 RX ADMIN — INSULIN LISPRO 4 UNITS: 100 INJECTION, SOLUTION INTRAVENOUS; SUBCUTANEOUS at 12:26

## 2018-05-26 RX ADMIN — DULOXETINE HYDROCHLORIDE 120 MG: 60 CAPSULE, DELAYED RELEASE ORAL at 09:30

## 2018-05-26 RX ADMIN — OXYCODONE HYDROCHLORIDE 10 MG: 10 TABLET, FILM COATED, EXTENDED RELEASE ORAL at 07:07

## 2018-05-26 RX ADMIN — CLONIDINE HYDROCHLORIDE 0.2 MG: 0.2 TABLET ORAL at 15:01

## 2018-05-26 RX ADMIN — TIZANIDINE 4 MG: 4 TABLET ORAL at 09:31

## 2018-05-26 RX ADMIN — CLONIDINE HYDROCHLORIDE 0.2 MG: 0.2 TABLET ORAL at 09:31

## 2018-05-26 RX ADMIN — LINAGLIPTIN 5 MG: 5 TABLET, FILM COATED ORAL at 09:31

## 2018-05-26 RX ADMIN — LISINOPRIL 10 MG: 10 TABLET ORAL at 09:40

## 2018-05-26 RX ADMIN — PREGABALIN 200 MG: 100 CAPSULE ORAL at 09:40

## 2018-05-26 RX ADMIN — AMLODIPINE BESYLATE 10 MG: 10 TABLET ORAL at 09:31

## 2018-05-26 RX ADMIN — INSULIN HUMAN 70 UNITS: 100 INJECTION, SOLUTION PARENTERAL at 12:52

## 2018-05-26 RX ADMIN — PREGABALIN 200 MG: 100 CAPSULE ORAL at 14:59

## 2018-05-26 RX ADMIN — METOPROLOL TARTRATE 100 MG: 50 TABLET ORAL at 09:31

## 2018-05-26 RX ADMIN — Medication 10 ML: at 09:34

## 2018-05-26 RX ADMIN — VANCOMYCIN HYDROCHLORIDE 2000 MG: 10 INJECTION, POWDER, LYOPHILIZED, FOR SOLUTION INTRAVENOUS at 18:29

## 2018-05-26 ASSESSMENT — PAIN SCALES - GENERAL
PAINLEVEL_OUTOF10: 9
PAINLEVEL_OUTOF10: 0
PAINLEVEL_OUTOF10: 0
PAINLEVEL_OUTOF10: 7
PAINLEVEL_OUTOF10: 0
PAINLEVEL_OUTOF10: 0

## 2018-05-27 ENCOUNTER — APPOINTMENT (OUTPATIENT)
Dept: NUCLEAR MEDICINE | Age: 63
DRG: 623 | End: 2018-05-27
Attending: SURGERY
Payer: MEDICARE

## 2018-05-27 LAB
ANAEROBIC CULTURE: NORMAL
ANION GAP SERPL CALCULATED.3IONS-SCNC: 10 MMOL/L (ref 7–16)
BLOOD CULTURE, ROUTINE: ABNORMAL
BUN BLDV-MCNC: 19 MG/DL (ref 8–23)
CALCIUM SERPL-MCNC: 8.7 MG/DL (ref 8.6–10.2)
CHLORIDE BLD-SCNC: 106 MMOL/L (ref 98–107)
CO2: 23 MMOL/L (ref 22–29)
CREAT SERPL-MCNC: 1.5 MG/DL (ref 0.7–1.2)
CULTURE SURGICAL: ABNORMAL
CULTURE SURGICAL: ABNORMAL
CULTURE, BLOOD 2: ABNORMAL
CULTURE, BLOOD 2: ABNORMAL
GFR AFRICAN AMERICAN: 58
GFR NON-AFRICAN AMERICAN: 58 ML/MIN/1.73
GLUCOSE BLD-MCNC: 220 MG/DL (ref 74–109)
GRAM STAIN RESULT: ABNORMAL
LV EF: 61 %
LVEF MODALITY: NORMAL
METER GLUCOSE: 233 MG/DL (ref 70–110)
METER GLUCOSE: 255 MG/DL (ref 70–110)
METER GLUCOSE: 262 MG/DL (ref 70–110)
METER GLUCOSE: 293 MG/DL (ref 70–110)
ORGANISM: ABNORMAL
POTASSIUM SERPL-SCNC: 4.8 MMOL/L (ref 3.5–5)
SODIUM BLD-SCNC: 139 MMOL/L (ref 132–146)
VANCOMYCIN TROUGH: 15.5 MCG/ML (ref 5–16)
WOUND/ABSCESS: ABNORMAL

## 2018-05-27 PROCEDURE — 93017 CV STRESS TEST TRACING ONLY: CPT

## 2018-05-27 PROCEDURE — 6370000000 HC RX 637 (ALT 250 FOR IP): Performed by: INTERNAL MEDICINE

## 2018-05-27 PROCEDURE — 2060000000 HC ICU INTERMEDIATE R&B

## 2018-05-27 PROCEDURE — 6360000002 HC RX W HCPCS: Performed by: SPECIALIST

## 2018-05-27 PROCEDURE — 80048 BASIC METABOLIC PNL TOTAL CA: CPT

## 2018-05-27 PROCEDURE — 36415 COLL VENOUS BLD VENIPUNCTURE: CPT

## 2018-05-27 PROCEDURE — 80202 ASSAY OF VANCOMYCIN: CPT

## 2018-05-27 PROCEDURE — 99232 SBSQ HOSP IP/OBS MODERATE 35: CPT | Performed by: INTERNAL MEDICINE

## 2018-05-27 PROCEDURE — 2580000003 HC RX 258: Performed by: SPECIALIST

## 2018-05-27 PROCEDURE — 82962 GLUCOSE BLOOD TEST: CPT

## 2018-05-27 PROCEDURE — 78452 HT MUSCLE IMAGE SPECT MULT: CPT

## 2018-05-27 PROCEDURE — A9500 TC99M SESTAMIBI: HCPCS | Performed by: RADIOLOGY

## 2018-05-27 PROCEDURE — 6370000000 HC RX 637 (ALT 250 FOR IP): Performed by: SURGERY

## 2018-05-27 PROCEDURE — 2580000003 HC RX 258: Performed by: INTERNAL MEDICINE

## 2018-05-27 PROCEDURE — 3430000000 HC RX DIAGNOSTIC RADIOPHARMACEUTICAL: Performed by: RADIOLOGY

## 2018-05-27 PROCEDURE — 6360000002 HC RX W HCPCS: Performed by: INTERNAL MEDICINE

## 2018-05-27 RX ORDER — LACTULOSE 10 G/15ML
20 SOLUTION ORAL 2 TIMES DAILY
Status: DISCONTINUED | OUTPATIENT
Start: 2018-05-27 | End: 2018-05-30 | Stop reason: HOSPADM

## 2018-05-27 RX ORDER — INSULIN GLARGINE 100 [IU]/ML
15 INJECTION, SOLUTION SUBCUTANEOUS 2 TIMES DAILY
Status: DISCONTINUED | OUTPATIENT
Start: 2018-05-27 | End: 2018-05-30 | Stop reason: HOSPADM

## 2018-05-27 RX ADMIN — OXYCODONE HYDROCHLORIDE 10 MG: 10 TABLET, FILM COATED, EXTENDED RELEASE ORAL at 18:49

## 2018-05-27 RX ADMIN — OXYCODONE HYDROCHLORIDE 10 MG: 10 TABLET, FILM COATED, EXTENDED RELEASE ORAL at 06:11

## 2018-05-27 RX ADMIN — INSULIN GLARGINE 15 UNITS: 100 INJECTION, SOLUTION SUBCUTANEOUS at 21:48

## 2018-05-27 RX ADMIN — Medication 12 MILLICURIE: at 08:00

## 2018-05-27 RX ADMIN — PANTOPRAZOLE SODIUM 40 MG: 40 TABLET, DELAYED RELEASE ORAL at 05:59

## 2018-05-27 RX ADMIN — SODIUM CHLORIDE 3 G: 900 INJECTION INTRAVENOUS at 21:02

## 2018-05-27 RX ADMIN — SODIUM CHLORIDE 3 G: 900 INJECTION INTRAVENOUS at 05:57

## 2018-05-27 RX ADMIN — TIZANIDINE 4 MG: 4 TABLET ORAL at 21:17

## 2018-05-27 RX ADMIN — REGADENOSON 0.4 MG: 0.08 INJECTION, SOLUTION INTRAVENOUS at 09:32

## 2018-05-27 RX ADMIN — LINAGLIPTIN 5 MG: 5 TABLET, FILM COATED ORAL at 11:30

## 2018-05-27 RX ADMIN — INSULIN LISPRO 5 UNITS: 100 INJECTION, SOLUTION INTRAVENOUS; SUBCUTANEOUS at 21:48

## 2018-05-27 RX ADMIN — PREGABALIN 200 MG: 100 CAPSULE ORAL at 11:30

## 2018-05-27 RX ADMIN — CLONIDINE HYDROCHLORIDE 0.2 MG: 0.2 TABLET ORAL at 21:18

## 2018-05-27 RX ADMIN — INSULIN LISPRO 9 UNITS: 100 INJECTION, SOLUTION INTRAVENOUS; SUBCUTANEOUS at 11:33

## 2018-05-27 RX ADMIN — TIZANIDINE 4 MG: 4 TABLET ORAL at 11:29

## 2018-05-27 RX ADMIN — CLONIDINE HYDROCHLORIDE 0.2 MG: 0.2 TABLET ORAL at 16:47

## 2018-05-27 RX ADMIN — ATORVASTATIN CALCIUM 10 MG: 10 TABLET, FILM COATED ORAL at 21:19

## 2018-05-27 RX ADMIN — AMLODIPINE BESYLATE 10 MG: 10 TABLET ORAL at 11:30

## 2018-05-27 RX ADMIN — TIZANIDINE 4 MG: 4 TABLET ORAL at 16:47

## 2018-05-27 RX ADMIN — PREGABALIN 200 MG: 100 CAPSULE ORAL at 21:17

## 2018-05-27 RX ADMIN — INSULIN GLARGINE 15 UNITS: 100 INJECTION, SOLUTION SUBCUTANEOUS at 11:29

## 2018-05-27 RX ADMIN — METOPROLOL TARTRATE 100 MG: 50 TABLET ORAL at 21:17

## 2018-05-27 RX ADMIN — SODIUM CHLORIDE 3 G: 900 INJECTION INTRAVENOUS at 11:29

## 2018-05-27 RX ADMIN — METOPROLOL TARTRATE 100 MG: 50 TABLET ORAL at 11:30

## 2018-05-27 RX ADMIN — PREGABALIN 200 MG: 100 CAPSULE ORAL at 16:47

## 2018-05-27 RX ADMIN — INSULIN LISPRO 9 UNITS: 100 INJECTION, SOLUTION INTRAVENOUS; SUBCUTANEOUS at 16:46

## 2018-05-27 RX ADMIN — Medication 35 MILLICURIE: at 09:30

## 2018-05-27 RX ADMIN — VANCOMYCIN HYDROCHLORIDE 2000 MG: 10 INJECTION, POWDER, LYOPHILIZED, FOR SOLUTION INTRAVENOUS at 18:01

## 2018-05-27 RX ADMIN — SODIUM CHLORIDE 3 G: 900 INJECTION INTRAVENOUS at 00:33

## 2018-05-27 RX ADMIN — DULOXETINE HYDROCHLORIDE 120 MG: 60 CAPSULE, DELAYED RELEASE ORAL at 11:29

## 2018-05-27 RX ADMIN — LISINOPRIL 10 MG: 10 TABLET ORAL at 11:29

## 2018-05-27 RX ADMIN — SPIRONOLACTONE 50 MG: 25 TABLET ORAL at 11:30

## 2018-05-27 RX ADMIN — CLONIDINE HYDROCHLORIDE 0.2 MG: 0.2 TABLET ORAL at 11:30

## 2018-05-27 ASSESSMENT — PAIN SCALES - GENERAL
PAINLEVEL_OUTOF10: 10
PAINLEVEL_OUTOF10: 0
PAINLEVEL_OUTOF10: 4
PAINLEVEL_OUTOF10: 0
PAINLEVEL_OUTOF10: 0

## 2018-05-28 LAB
ANION GAP SERPL CALCULATED.3IONS-SCNC: 13 MMOL/L (ref 7–16)
BLOOD CULTURE, ROUTINE: NORMAL
BUN BLDV-MCNC: 17 MG/DL (ref 8–23)
CALCIUM SERPL-MCNC: 9 MG/DL (ref 8.6–10.2)
CHLORIDE BLD-SCNC: 103 MMOL/L (ref 98–107)
CO2: 24 MMOL/L (ref 22–29)
CREAT SERPL-MCNC: 1.5 MG/DL (ref 0.7–1.2)
CULTURE, BLOOD 2: NORMAL
GFR AFRICAN AMERICAN: 58
GFR NON-AFRICAN AMERICAN: 58 ML/MIN/1.73
GLUCOSE BLD-MCNC: 168 MG/DL (ref 74–109)
METER GLUCOSE: 198 MG/DL (ref 70–110)
METER GLUCOSE: 202 MG/DL (ref 70–110)
METER GLUCOSE: 273 MG/DL (ref 70–110)
METER GLUCOSE: 287 MG/DL (ref 70–110)
POTASSIUM SERPL-SCNC: 4.5 MMOL/L (ref 3.5–5)
SODIUM BLD-SCNC: 140 MMOL/L (ref 132–146)

## 2018-05-28 PROCEDURE — 36415 COLL VENOUS BLD VENIPUNCTURE: CPT

## 2018-05-28 PROCEDURE — 6370000000 HC RX 637 (ALT 250 FOR IP): Performed by: INTERNAL MEDICINE

## 2018-05-28 PROCEDURE — 80048 BASIC METABOLIC PNL TOTAL CA: CPT

## 2018-05-28 PROCEDURE — 6360000002 HC RX W HCPCS: Performed by: INTERNAL MEDICINE

## 2018-05-28 PROCEDURE — 2580000003 HC RX 258: Performed by: INTERNAL MEDICINE

## 2018-05-28 PROCEDURE — 2060000000 HC ICU INTERMEDIATE R&B

## 2018-05-28 PROCEDURE — 6370000000 HC RX 637 (ALT 250 FOR IP): Performed by: SURGERY

## 2018-05-28 PROCEDURE — 0JBQ0ZZ EXCISION OF RIGHT FOOT SUBCUTANEOUS TISSUE AND FASCIA, OPEN APPROACH: ICD-10-PCS | Performed by: PODIATRIST

## 2018-05-28 PROCEDURE — 82962 GLUCOSE BLOOD TEST: CPT

## 2018-05-28 PROCEDURE — 2580000003 HC RX 258: Performed by: SURGERY

## 2018-05-28 RX ORDER — LIDOCAINE HYDROCHLORIDE 10 MG/ML
5 INJECTION, SOLUTION EPIDURAL; INFILTRATION; INTRACAUDAL; PERINEURAL ONCE
Status: DISCONTINUED | OUTPATIENT
Start: 2018-05-28 | End: 2018-05-30 | Stop reason: HOSPADM

## 2018-05-28 RX ORDER — AMPICILLIN AND SULBACTAM 2; 1 G/1; G/1
3 INJECTION, POWDER, FOR SOLUTION INTRAMUSCULAR; INTRAVENOUS EVERY 6 HOURS
Qty: 40 EACH | Refills: 0 | Status: SHIPPED | OUTPATIENT
Start: 2018-05-28 | End: 2018-07-01

## 2018-05-28 RX ORDER — SODIUM CHLORIDE 0.9 % (FLUSH) 0.9 %
10 SYRINGE (ML) INJECTION EVERY 12 HOURS SCHEDULED
Status: DISCONTINUED | OUTPATIENT
Start: 2018-05-28 | End: 2018-05-30 | Stop reason: HOSPADM

## 2018-05-28 RX ORDER — SODIUM CHLORIDE 0.9 % (FLUSH) 0.9 %
10 SYRINGE (ML) INJECTION PRN
Status: DISCONTINUED | OUTPATIENT
Start: 2018-05-28 | End: 2018-05-30 | Stop reason: HOSPADM

## 2018-05-28 RX ADMIN — INSULIN LISPRO 5 UNITS: 100 INJECTION, SOLUTION INTRAVENOUS; SUBCUTANEOUS at 21:33

## 2018-05-28 RX ADMIN — INSULIN GLARGINE 15 UNITS: 100 INJECTION, SOLUTION SUBCUTANEOUS at 08:32

## 2018-05-28 RX ADMIN — INSULIN GLARGINE 15 UNITS: 100 INJECTION, SOLUTION SUBCUTANEOUS at 21:34

## 2018-05-28 RX ADMIN — OXYCODONE HYDROCHLORIDE 10 MG: 10 TABLET, FILM COATED, EXTENDED RELEASE ORAL at 06:43

## 2018-05-28 RX ADMIN — SODIUM CHLORIDE 3 G: 900 INJECTION INTRAVENOUS at 11:37

## 2018-05-28 RX ADMIN — PREGABALIN 200 MG: 100 CAPSULE ORAL at 21:33

## 2018-05-28 RX ADMIN — TIZANIDINE 4 MG: 4 TABLET ORAL at 21:33

## 2018-05-28 RX ADMIN — OXYCODONE HYDROCHLORIDE 10 MG: 10 TABLET, FILM COATED, EXTENDED RELEASE ORAL at 19:01

## 2018-05-28 RX ADMIN — ATORVASTATIN CALCIUM 10 MG: 10 TABLET, FILM COATED ORAL at 21:33

## 2018-05-28 RX ADMIN — CLONIDINE HYDROCHLORIDE 0.2 MG: 0.2 TABLET ORAL at 08:31

## 2018-05-28 RX ADMIN — CLONIDINE HYDROCHLORIDE 0.2 MG: 0.2 TABLET ORAL at 14:16

## 2018-05-28 RX ADMIN — DULOXETINE HYDROCHLORIDE 120 MG: 60 CAPSULE, DELAYED RELEASE ORAL at 08:31

## 2018-05-28 RX ADMIN — INSULIN LISPRO 6 UNITS: 100 INJECTION, SOLUTION INTRAVENOUS; SUBCUTANEOUS at 06:47

## 2018-05-28 RX ADMIN — AMLODIPINE BESYLATE 10 MG: 10 TABLET ORAL at 08:31

## 2018-05-28 RX ADMIN — SODIUM CHLORIDE: 9 INJECTION, SOLUTION INTRAVENOUS at 06:43

## 2018-05-28 RX ADMIN — PANTOPRAZOLE SODIUM 40 MG: 40 TABLET, DELAYED RELEASE ORAL at 06:43

## 2018-05-28 RX ADMIN — INSULIN LISPRO 3 UNITS: 100 INJECTION, SOLUTION INTRAVENOUS; SUBCUTANEOUS at 11:37

## 2018-05-28 RX ADMIN — SODIUM CHLORIDE 3 G: 900 INJECTION INTRAVENOUS at 05:37

## 2018-05-28 RX ADMIN — CLONIDINE HYDROCHLORIDE 0.2 MG: 0.2 TABLET ORAL at 21:33

## 2018-05-28 RX ADMIN — LISINOPRIL 10 MG: 10 TABLET ORAL at 08:31

## 2018-05-28 RX ADMIN — TIZANIDINE 4 MG: 4 TABLET ORAL at 14:16

## 2018-05-28 RX ADMIN — SODIUM CHLORIDE 3 G: 900 INJECTION INTRAVENOUS at 21:33

## 2018-05-28 RX ADMIN — INSULIN LISPRO 9 UNITS: 100 INJECTION, SOLUTION INTRAVENOUS; SUBCUTANEOUS at 16:53

## 2018-05-28 RX ADMIN — PREGABALIN 200 MG: 100 CAPSULE ORAL at 14:16

## 2018-05-28 RX ADMIN — TIZANIDINE 4 MG: 4 TABLET ORAL at 08:31

## 2018-05-28 RX ADMIN — LINAGLIPTIN 5 MG: 5 TABLET, FILM COATED ORAL at 08:31

## 2018-05-28 RX ADMIN — METOPROLOL TARTRATE 100 MG: 50 TABLET ORAL at 08:31

## 2018-05-28 RX ADMIN — SPIRONOLACTONE 50 MG: 25 TABLET ORAL at 08:31

## 2018-05-28 RX ADMIN — PREGABALIN 200 MG: 100 CAPSULE ORAL at 08:31

## 2018-05-28 RX ADMIN — METOPROLOL TARTRATE 100 MG: 50 TABLET ORAL at 21:33

## 2018-05-28 ASSESSMENT — PAIN SCALES - GENERAL
PAINLEVEL_OUTOF10: 0
PAINLEVEL_OUTOF10: 9
PAINLEVEL_OUTOF10: 0
PAINLEVEL_OUTOF10: 6
PAINLEVEL_OUTOF10: 0
PAINLEVEL_OUTOF10: 0

## 2018-05-29 LAB
ANION GAP SERPL CALCULATED.3IONS-SCNC: 11 MMOL/L (ref 7–16)
BUN BLDV-MCNC: 15 MG/DL (ref 8–23)
CALCIUM SERPL-MCNC: 9 MG/DL (ref 8.6–10.2)
CHLORIDE BLD-SCNC: 102 MMOL/L (ref 98–107)
CO2: 25 MMOL/L (ref 22–29)
CREAT SERPL-MCNC: 1.5 MG/DL (ref 0.7–1.2)
GFR AFRICAN AMERICAN: 58
GFR NON-AFRICAN AMERICAN: 58 ML/MIN/1.73
GLUCOSE BLD-MCNC: 203 MG/DL (ref 74–109)
METER GLUCOSE: 201 MG/DL (ref 70–110)
METER GLUCOSE: 233 MG/DL (ref 70–110)
METER GLUCOSE: 266 MG/DL (ref 70–110)
METER GLUCOSE: 280 MG/DL (ref 70–110)
POTASSIUM SERPL-SCNC: 4.3 MMOL/L (ref 3.5–5)
SODIUM BLD-SCNC: 138 MMOL/L (ref 132–146)

## 2018-05-29 PROCEDURE — 6370000000 HC RX 637 (ALT 250 FOR IP): Performed by: INTERNAL MEDICINE

## 2018-05-29 PROCEDURE — 80048 BASIC METABOLIC PNL TOTAL CA: CPT

## 2018-05-29 PROCEDURE — 2060000000 HC ICU INTERMEDIATE R&B

## 2018-05-29 PROCEDURE — 97161 PT EVAL LOW COMPLEX 20 MIN: CPT | Performed by: PHYSICAL THERAPIST

## 2018-05-29 PROCEDURE — 36569 INSJ PICC 5 YR+ W/O IMAGING: CPT

## 2018-05-29 PROCEDURE — 6370000000 HC RX 637 (ALT 250 FOR IP): Performed by: SURGERY

## 2018-05-29 PROCEDURE — C1751 CATH, INF, PER/CENT/MIDLINE: HCPCS

## 2018-05-29 PROCEDURE — 2580000003 HC RX 258: Performed by: INTERNAL MEDICINE

## 2018-05-29 PROCEDURE — G8982 BODY POS GOAL STATUS: HCPCS | Performed by: PHYSICAL THERAPIST

## 2018-05-29 PROCEDURE — G8981 BODY POS CURRENT STATUS: HCPCS | Performed by: PHYSICAL THERAPIST

## 2018-05-29 PROCEDURE — 36415 COLL VENOUS BLD VENIPUNCTURE: CPT

## 2018-05-29 PROCEDURE — 97605 NEG PRS WND THER DME<=50SQCM: CPT

## 2018-05-29 PROCEDURE — 02HV33Z INSERTION OF INFUSION DEVICE INTO SUPERIOR VENA CAVA, PERCUTANEOUS APPROACH: ICD-10-PCS | Performed by: INTERNAL MEDICINE

## 2018-05-29 PROCEDURE — 82962 GLUCOSE BLOOD TEST: CPT

## 2018-05-29 PROCEDURE — 6360000002 HC RX W HCPCS: Performed by: INTERNAL MEDICINE

## 2018-05-29 PROCEDURE — B548ZZA ULTRASONOGRAPHY OF SUPERIOR VENA CAVA, GUIDANCE: ICD-10-PCS | Performed by: INTERNAL MEDICINE

## 2018-05-29 PROCEDURE — 2580000003 HC RX 258: Performed by: SURGERY

## 2018-05-29 RX ADMIN — CLONIDINE HYDROCHLORIDE 0.2 MG: 0.2 TABLET ORAL at 13:59

## 2018-05-29 RX ADMIN — TIZANIDINE 4 MG: 4 TABLET ORAL at 09:33

## 2018-05-29 RX ADMIN — SODIUM CHLORIDE 3 G: 900 INJECTION INTRAVENOUS at 20:28

## 2018-05-29 RX ADMIN — INSULIN LISPRO 5 UNITS: 100 INJECTION, SOLUTION INTRAVENOUS; SUBCUTANEOUS at 20:35

## 2018-05-29 RX ADMIN — OXYCODONE HYDROCHLORIDE 10 MG: 10 TABLET, FILM COATED, EXTENDED RELEASE ORAL at 07:01

## 2018-05-29 RX ADMIN — DULOXETINE HYDROCHLORIDE 120 MG: 60 CAPSULE, DELAYED RELEASE ORAL at 09:33

## 2018-05-29 RX ADMIN — OXYCODONE HYDROCHLORIDE 10 MG: 10 TABLET, FILM COATED, EXTENDED RELEASE ORAL at 18:26

## 2018-05-29 RX ADMIN — INSULIN LISPRO 6 UNITS: 100 INJECTION, SOLUTION INTRAVENOUS; SUBCUTANEOUS at 09:33

## 2018-05-29 RX ADMIN — INSULIN GLARGINE 15 UNITS: 100 INJECTION, SOLUTION SUBCUTANEOUS at 20:38

## 2018-05-29 RX ADMIN — ATORVASTATIN CALCIUM 10 MG: 10 TABLET, FILM COATED ORAL at 20:29

## 2018-05-29 RX ADMIN — LISINOPRIL 10 MG: 10 TABLET ORAL at 09:33

## 2018-05-29 RX ADMIN — PANTOPRAZOLE SODIUM 40 MG: 40 TABLET, DELAYED RELEASE ORAL at 07:01

## 2018-05-29 RX ADMIN — SODIUM CHLORIDE 3 G: 900 INJECTION INTRAVENOUS at 05:07

## 2018-05-29 RX ADMIN — METOPROLOL TARTRATE 100 MG: 50 TABLET ORAL at 09:33

## 2018-05-29 RX ADMIN — SODIUM CHLORIDE: 9 INJECTION, SOLUTION INTRAVENOUS at 15:53

## 2018-05-29 RX ADMIN — TIZANIDINE 4 MG: 4 TABLET ORAL at 20:29

## 2018-05-29 RX ADMIN — INSULIN LISPRO 9 UNITS: 100 INJECTION, SOLUTION INTRAVENOUS; SUBCUTANEOUS at 17:32

## 2018-05-29 RX ADMIN — PREGABALIN 200 MG: 100 CAPSULE ORAL at 20:28

## 2018-05-29 RX ADMIN — TIZANIDINE 4 MG: 4 TABLET ORAL at 13:59

## 2018-05-29 RX ADMIN — INSULIN GLARGINE 15 UNITS: 100 INJECTION, SOLUTION SUBCUTANEOUS at 09:34

## 2018-05-29 RX ADMIN — CLONIDINE HYDROCHLORIDE 0.2 MG: 0.2 TABLET ORAL at 09:33

## 2018-05-29 RX ADMIN — Medication 10 ML: at 09:34

## 2018-05-29 RX ADMIN — PREGABALIN 200 MG: 100 CAPSULE ORAL at 09:10

## 2018-05-29 RX ADMIN — LACTULOSE 20 G: 20 SOLUTION ORAL at 20:33

## 2018-05-29 RX ADMIN — LINAGLIPTIN 5 MG: 5 TABLET, FILM COATED ORAL at 09:33

## 2018-05-29 RX ADMIN — Medication 10 ML: at 20:30

## 2018-05-29 RX ADMIN — SPIRONOLACTONE 50 MG: 25 TABLET ORAL at 09:33

## 2018-05-29 RX ADMIN — AMLODIPINE BESYLATE 10 MG: 10 TABLET ORAL at 10:00

## 2018-05-29 RX ADMIN — SODIUM CHLORIDE: 9 INJECTION, SOLUTION INTRAVENOUS at 01:36

## 2018-05-29 RX ADMIN — PREGABALIN 200 MG: 100 CAPSULE ORAL at 13:59

## 2018-05-29 RX ADMIN — METOPROLOL TARTRATE 100 MG: 50 TABLET ORAL at 20:29

## 2018-05-29 RX ADMIN — INSULIN LISPRO 6 UNITS: 100 INJECTION, SOLUTION INTRAVENOUS; SUBCUTANEOUS at 12:19

## 2018-05-29 RX ADMIN — CLONIDINE HYDROCHLORIDE 0.2 MG: 0.2 TABLET ORAL at 20:29

## 2018-05-29 RX ADMIN — SODIUM CHLORIDE 3 G: 900 INJECTION INTRAVENOUS at 13:59

## 2018-05-29 ASSESSMENT — PAIN SCALES - GENERAL
PAINLEVEL_OUTOF10: 0
PAINLEVEL_OUTOF10: 0
PAINLEVEL_OUTOF10: 5
PAINLEVEL_OUTOF10: 5
PAINLEVEL_OUTOF10: 0
PAINLEVEL_OUTOF10: 0

## 2018-05-29 ASSESSMENT — PAIN DESCRIPTION - ORIENTATION: ORIENTATION: LOWER

## 2018-05-29 ASSESSMENT — PAIN DESCRIPTION - LOCATION: LOCATION: BACK

## 2018-05-29 ASSESSMENT — PAIN DESCRIPTION - PAIN TYPE: TYPE: ACUTE PAIN

## 2018-05-30 VITALS
WEIGHT: 315 LBS | OXYGEN SATURATION: 95 % | TEMPERATURE: 98.1 F | RESPIRATION RATE: 18 BRPM | HEART RATE: 80 BPM | HEIGHT: 74 IN | SYSTOLIC BLOOD PRESSURE: 130 MMHG | DIASTOLIC BLOOD PRESSURE: 62 MMHG | BODY MASS INDEX: 40.43 KG/M2

## 2018-05-30 LAB
ANION GAP SERPL CALCULATED.3IONS-SCNC: 10 MMOL/L (ref 7–16)
BUN BLDV-MCNC: 15 MG/DL (ref 8–23)
CALCIUM SERPL-MCNC: 9.1 MG/DL (ref 8.6–10.2)
CHLORIDE BLD-SCNC: 102 MMOL/L (ref 98–107)
CO2: 26 MMOL/L (ref 22–29)
CREAT SERPL-MCNC: 1.5 MG/DL (ref 0.7–1.2)
GFR AFRICAN AMERICAN: 58
GFR NON-AFRICAN AMERICAN: 58 ML/MIN/1.73
GLUCOSE BLD-MCNC: 168 MG/DL (ref 74–109)
METER GLUCOSE: 189 MG/DL (ref 70–110)
METER GLUCOSE: 222 MG/DL (ref 70–110)
METER GLUCOSE: 226 MG/DL (ref 70–110)
POTASSIUM SERPL-SCNC: 4.1 MMOL/L (ref 3.5–5)
SODIUM BLD-SCNC: 138 MMOL/L (ref 132–146)

## 2018-05-30 PROCEDURE — G8987 SELF CARE CURRENT STATUS: HCPCS

## 2018-05-30 PROCEDURE — 2580000003 HC RX 258: Performed by: SURGERY

## 2018-05-30 PROCEDURE — 6360000002 HC RX W HCPCS: Performed by: INTERNAL MEDICINE

## 2018-05-30 PROCEDURE — 97535 SELF CARE MNGMENT TRAINING: CPT

## 2018-05-30 PROCEDURE — 80048 BASIC METABOLIC PNL TOTAL CA: CPT

## 2018-05-30 PROCEDURE — G8988 SELF CARE GOAL STATUS: HCPCS

## 2018-05-30 PROCEDURE — 6370000000 HC RX 637 (ALT 250 FOR IP): Performed by: SURGERY

## 2018-05-30 PROCEDURE — 94660 CPAP INITIATION&MGMT: CPT

## 2018-05-30 PROCEDURE — 97166 OT EVAL MOD COMPLEX 45 MIN: CPT

## 2018-05-30 PROCEDURE — 6370000000 HC RX 637 (ALT 250 FOR IP): Performed by: INTERNAL MEDICINE

## 2018-05-30 PROCEDURE — 36415 COLL VENOUS BLD VENIPUNCTURE: CPT

## 2018-05-30 PROCEDURE — 82962 GLUCOSE BLOOD TEST: CPT

## 2018-05-30 PROCEDURE — 2580000003 HC RX 258: Performed by: INTERNAL MEDICINE

## 2018-05-30 RX ORDER — INSULIN GLARGINE 100 [IU]/ML
15 INJECTION, SOLUTION SUBCUTANEOUS 2 TIMES DAILY
Qty: 1 VIAL | Refills: 3 | Status: SHIPPED | OUTPATIENT
Start: 2018-05-30 | End: 2019-01-09 | Stop reason: SDUPTHER

## 2018-05-30 RX ORDER — LACTULOSE 10 G/15ML
20 SOLUTION ORAL 2 TIMES DAILY
Qty: 473 ML | Refills: 1 | Status: SHIPPED | OUTPATIENT
Start: 2018-05-30 | End: 2018-10-24 | Stop reason: ALTCHOICE

## 2018-05-30 RX ORDER — LISINOPRIL 10 MG/1
10 TABLET ORAL DAILY
Qty: 30 TABLET | Refills: 3 | Status: ON HOLD | OUTPATIENT
Start: 2018-05-30 | End: 2018-08-07

## 2018-05-30 RX ADMIN — Medication 10 ML: at 09:08

## 2018-05-30 RX ADMIN — SODIUM CHLORIDE 3 G: 900 INJECTION INTRAVENOUS at 05:38

## 2018-05-30 RX ADMIN — OXYCODONE HYDROCHLORIDE 10 MG: 10 TABLET, FILM COATED, EXTENDED RELEASE ORAL at 06:42

## 2018-05-30 RX ADMIN — INSULIN LISPRO 6 UNITS: 100 INJECTION, SOLUTION INTRAVENOUS; SUBCUTANEOUS at 11:27

## 2018-05-30 RX ADMIN — INSULIN GLARGINE 15 UNITS: 100 INJECTION, SOLUTION SUBCUTANEOUS at 09:08

## 2018-05-30 RX ADMIN — TIZANIDINE 4 MG: 4 TABLET ORAL at 14:00

## 2018-05-30 RX ADMIN — METOPROLOL TARTRATE 100 MG: 50 TABLET ORAL at 09:08

## 2018-05-30 RX ADMIN — PREGABALIN 200 MG: 100 CAPSULE ORAL at 09:08

## 2018-05-30 RX ADMIN — CLONIDINE HYDROCHLORIDE 0.2 MG: 0.2 TABLET ORAL at 09:08

## 2018-05-30 RX ADMIN — INSULIN LISPRO 6 UNITS: 100 INJECTION, SOLUTION INTRAVENOUS; SUBCUTANEOUS at 17:40

## 2018-05-30 RX ADMIN — SODIUM CHLORIDE: 9 INJECTION, SOLUTION INTRAVENOUS at 08:19

## 2018-05-30 RX ADMIN — AMLODIPINE BESYLATE 10 MG: 10 TABLET ORAL at 09:08

## 2018-05-30 RX ADMIN — DULOXETINE HYDROCHLORIDE 120 MG: 60 CAPSULE, DELAYED RELEASE ORAL at 09:08

## 2018-05-30 RX ADMIN — LISINOPRIL 10 MG: 10 TABLET ORAL at 09:00

## 2018-05-30 RX ADMIN — SPIRONOLACTONE 50 MG: 25 TABLET ORAL at 09:08

## 2018-05-30 RX ADMIN — SODIUM CHLORIDE 3 G: 900 INJECTION INTRAVENOUS at 13:30

## 2018-05-30 RX ADMIN — TIZANIDINE 4 MG: 4 TABLET ORAL at 09:08

## 2018-05-30 RX ADMIN — INSULIN LISPRO 3 UNITS: 100 INJECTION, SOLUTION INTRAVENOUS; SUBCUTANEOUS at 06:45

## 2018-05-30 RX ADMIN — PANTOPRAZOLE SODIUM 40 MG: 40 TABLET, DELAYED RELEASE ORAL at 06:42

## 2018-05-30 RX ADMIN — PREGABALIN 200 MG: 100 CAPSULE ORAL at 14:00

## 2018-05-30 RX ADMIN — LINAGLIPTIN 5 MG: 5 TABLET, FILM COATED ORAL at 09:08

## 2018-05-30 RX ADMIN — CLONIDINE HYDROCHLORIDE 0.2 MG: 0.2 TABLET ORAL at 14:00

## 2018-05-30 ASSESSMENT — PAIN DESCRIPTION - FREQUENCY: FREQUENCY: CONTINUOUS

## 2018-05-30 ASSESSMENT — PAIN DESCRIPTION - PAIN TYPE: TYPE: CHRONIC PAIN

## 2018-05-30 ASSESSMENT — PAIN DESCRIPTION - LOCATION: LOCATION: BACK

## 2018-05-30 ASSESSMENT — PAIN SCALES - GENERAL
PAINLEVEL_OUTOF10: 3
PAINLEVEL_OUTOF10: 0
PAINLEVEL_OUTOF10: 2

## 2018-05-30 ASSESSMENT — PAIN DESCRIPTION - ORIENTATION: ORIENTATION: LOWER

## 2018-05-30 ASSESSMENT — PAIN DESCRIPTION - PROGRESSION: CLINICAL_PROGRESSION: GRADUALLY IMPROVING

## 2018-07-27 ENCOUNTER — HOSPITAL ENCOUNTER (EMERGENCY)
Age: 63
Discharge: HOME | End: 2018-07-27
Attending: EMERGENCY MEDICINE
Payer: MEDICARE

## 2018-07-27 ENCOUNTER — APPOINTMENT (OUTPATIENT)
Dept: GENERAL RADIOLOGY | Age: 63
End: 2018-07-27
Payer: MEDICARE

## 2018-07-27 VITALS
OXYGEN SATURATION: 98 % | HEART RATE: 66 BPM | DIASTOLIC BLOOD PRESSURE: 82 MMHG | SYSTOLIC BLOOD PRESSURE: 153 MMHG | HEIGHT: 74 IN | TEMPERATURE: 98.9 F | RESPIRATION RATE: 16 BRPM | BODY MASS INDEX: 40.43 KG/M2 | WEIGHT: 315 LBS
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
## 2018-07-27 DIAGNOSIS — S91.309A OPEN WOUND OF FOOT EXCLUDING TOES: ICD-10-CM

## 2018-07-27 DIAGNOSIS — M25.551 ACUTE HIP PAIN, RIGHT: ICD-10-CM

## 2018-07-27 LAB
ALBUMIN SERPL-MCNC: 3.4 G/DL (ref 3.5–5.2)
ALP BLD-CCNC: 105 U/L (ref 40–129)
ALT SERPL-CCNC: 12 U/L (ref 0–40)
ANION GAP SERPL CALCULATED.3IONS-SCNC: 12 MMOL/L (ref 7–16)
APTT: 61.1 SEC (ref 24.5–35.1)
AST SERPL-CCNC: 19 U/L (ref 0–39)
BASOPHILS ABSOLUTE: 0.05 E9/L (ref 0–0.2)
BASOPHILS RELATIVE PERCENT: 0.4 % (ref 0–2)
BILIRUB SERPL-MCNC: 0.3 MG/DL (ref 0–1.2)
BUN BLDV-MCNC: 27 MG/DL (ref 8–23)
CALCIUM SERPL-MCNC: 8.8 MG/DL (ref 8.6–10.2)
CHLORIDE BLD-SCNC: 94 MMOL/L (ref 98–107)
CO2: 23 MMOL/L (ref 22–29)
CREAT SERPL-MCNC: 2.5 MG/DL (ref 0.7–1.2)
EKG ATRIAL RATE: 64 BPM
EKG P AXIS: 32 DEGREES
EKG P-R INTERVAL: 246 MS
EKG Q-T INTERVAL: 412 MS
EKG QRS DURATION: 96 MS
EKG QTC CALCULATION (BAZETT): 418 MS
EKG T AXIS: 39 DEGREES
EKG VENTRICULAR RATE: 62 BPM
EOSINOPHILS ABSOLUTE: 0.13 E9/L (ref 0.05–0.5)
EOSINOPHILS RELATIVE PERCENT: 1 % (ref 0–6)
GFR AFRICAN AMERICAN: 32
GFR NON-AFRICAN AMERICAN: 32 ML/MIN/1.73
GLUCOSE BLD-MCNC: 386 MG/DL (ref 74–109)
HCT VFR BLD CALC: 26.2 % (ref 37–54)
HEMOGLOBIN: 8.6 G/DL (ref 12.5–16.5)
IMMATURE GRANULOCYTES #: 0.09 E9/L
IMMATURE GRANULOCYTES %: 0.7 % (ref 0–5)
INR BLD: 7.6
LACTIC ACID: 2.4 MMOL/L (ref 0.5–2.2)
LYMPHOCYTES ABSOLUTE: 2.35 E9/L (ref 1.5–4)
LYMPHOCYTES RELATIVE PERCENT: 18.9 % (ref 20–42)
MCH RBC QN AUTO: 25.3 PG (ref 26–35)
MCHC RBC AUTO-ENTMCNC: 32.8 % (ref 32–34.5)
MCV RBC AUTO: 77.1 FL (ref 80–99.9)
MONOCYTES ABSOLUTE: 0.97 E9/L (ref 0.1–0.95)
MONOCYTES RELATIVE PERCENT: 7.8 % (ref 2–12)
NEUTROPHILS ABSOLUTE: 8.82 E9/L (ref 1.8–7.3)
NEUTROPHILS RELATIVE PERCENT: 71.2 % (ref 43–80)
PDW BLD-RTO: 16 FL (ref 11.5–15)
PLATELET # BLD: 158 E9/L (ref 130–450)
PMV BLD AUTO: 11.7 FL (ref 7–12)
POTASSIUM SERPL-SCNC: 5.6 MMOL/L (ref 3.5–5)
PROTHROMBIN TIME: 83.7 SEC (ref 9.3–12.4)
RBC # BLD: 3.4 E12/L (ref 3.8–5.8)
SODIUM BLD-SCNC: 129 MMOL/L (ref 132–146)
TOTAL PROTEIN: 7.7 G/DL (ref 6.4–8.3)
TROPONIN: 0.02 NG/ML (ref 0–0.03)
WBC # BLD: 12.4 E9/L (ref 4.5–11.5)

## 2018-07-27 PROCEDURE — 99284 EMERGENCY DEPT VISIT MOD MDM: CPT

## 2018-07-27 PROCEDURE — 80053 COMPREHEN METABOLIC PANEL: CPT

## 2018-07-27 PROCEDURE — 87040 BLOOD CULTURE FOR BACTERIA: CPT

## 2018-07-27 PROCEDURE — 83605 ASSAY OF LACTIC ACID: CPT

## 2018-07-27 PROCEDURE — 84484 ASSAY OF TROPONIN QUANT: CPT

## 2018-07-27 PROCEDURE — 85610 PROTHROMBIN TIME: CPT

## 2018-07-27 PROCEDURE — 36415 COLL VENOUS BLD VENIPUNCTURE: CPT

## 2018-07-27 PROCEDURE — 73502 X-RAY EXAM HIP UNI 2-3 VIEWS: CPT

## 2018-07-27 PROCEDURE — 85730 THROMBOPLASTIN TIME PARTIAL: CPT

## 2018-07-27 PROCEDURE — 85025 COMPLETE CBC W/AUTO DIFF WBC: CPT

## 2018-07-27 ASSESSMENT — ENCOUNTER SYMPTOMS
EYE PAIN: 0
NAUSEA: 0
ABDOMINAL PAIN: 0
VOMITING: 0
WHEEZING: 0
COUGH: 0
SORE THROAT: 0
DIARRHEA: 0
SINUS PRESSURE: 0
EYE DISCHARGE: 0
BACK PAIN: 0
SHORTNESS OF BREATH: 0
EYE REDNESS: 0

## 2018-07-27 ASSESSMENT — PAIN DESCRIPTION - DESCRIPTORS: DESCRIPTORS: ACHING

## 2018-07-27 ASSESSMENT — PAIN DESCRIPTION - LOCATION: LOCATION: BACK;LEG

## 2018-07-27 ASSESSMENT — PAIN DESCRIPTION - ONSET: ONSET: ON-GOING

## 2018-07-27 ASSESSMENT — PAIN DESCRIPTION - ORIENTATION: ORIENTATION: MID;RIGHT

## 2018-07-27 ASSESSMENT — PAIN DESCRIPTION - PAIN TYPE: TYPE: CHRONIC PAIN;ACUTE PAIN

## 2018-07-27 ASSESSMENT — PAIN SCALES - GENERAL: PAINLEVEL_OUTOF10: 10

## 2018-07-27 NOTE — ED NOTES
Pt family member called this RN spoke with her and told her pt. Was discharge and dressed and wound was cleaned.  Pt is waiting in waiting room for family members arrival      Emre Gaona, [facility]  07/27/18 1910

## 2018-07-27 NOTE — ED PROVIDER NOTES
Patient is a 79-year-old male presenting to ED with right hip pain. Patient states that he fell yesterday onto his right hip. Patient noticed increased pain while walking but states he is able to ambulate as well as he was able to prior to incident typically with crutches or wheelchair. despite triage complaint patient does not have pain in the foot states his wound has been healing well. Patient has an open wound that he was evaluated for in the past by podiatry. Patient with inpatient for management of the wound a weeks ago. Patient was on antibiotics at home for several weeks. Patient is currently not on antibiotics. patient has not attempted to treat his symptoms with any medication. Patient states palpation of the right hip makes it worse. Movement worsens the right hip pain. Rest seems to improve the hip pain. Review of Systems   Constitutional: Negative for chills and fever. HENT: Negative for ear pain, sinus pressure and sore throat. Eyes: Negative for pain, discharge and redness. Respiratory: Negative for cough, shortness of breath and wheezing. Cardiovascular: Negative for chest pain. Gastrointestinal: Negative for abdominal pain, diarrhea, nausea and vomiting. Genitourinary: Negative for dysuria and frequency. Musculoskeletal: Positive for arthralgias. Negative for back pain. Skin: Positive for wound. Negative for rash. Neurological: Negative for weakness and headaches. Hematological: Negative for adenopathy. All other systems reviewed and are negative.       Physical Exam  Physical Exam   ---------------------------------------------------PHYSICAL EXAM--------------------------------------      Constitutional/General: Alert and oriented x3, well appearing, non toxic in NAD  Head: Normocephalic and atraumatic  Eyes: PERRL, EOMI  Mouth: Oropharynx clear, handling secretions, no trismus  Neck: Supple, full ROM,   Pulmonary: Lungs clear to auscultation bilaterally, no 7/27/2018  5:24 PM          Diagnosis:  1. Fall, initial encounter    2. Acute hip pain, right    3. Open wound of foot excluding toes        Disposition:  Patient's disposition: Discharge to home  Patient's condition is stable.            Luis Hwang DO  Resident  07/28/18 4588

## 2018-07-31 ENCOUNTER — APPOINTMENT (OUTPATIENT)
Dept: ULTRASOUND IMAGING | Age: 63
DRG: 264 | End: 2018-07-31
Payer: MEDICARE

## 2018-07-31 ENCOUNTER — HOSPITAL ENCOUNTER (INPATIENT)
Age: 63
LOS: 7 days | Discharge: NURSING, REHAB OR LONG-TERM CARE | DRG: 264 | End: 2018-08-07
Attending: EMERGENCY MEDICINE | Admitting: FAMILY MEDICINE
Payer: MEDICARE

## 2018-07-31 ENCOUNTER — APPOINTMENT (OUTPATIENT)
Dept: GENERAL RADIOLOGY | Age: 63
DRG: 264 | End: 2018-07-31
Payer: MEDICARE

## 2018-07-31 DIAGNOSIS — L08.9 RIGHT FOOT INFECTION: Primary | ICD-10-CM

## 2018-07-31 DIAGNOSIS — I73.9 PVD (PERIPHERAL VASCULAR DISEASE) (HCC): ICD-10-CM

## 2018-07-31 PROBLEM — Z86.718 HISTORY OF DVT (DEEP VEIN THROMBOSIS): Status: ACTIVE | Noted: 2018-07-31

## 2018-07-31 PROBLEM — D72.829 LEUKOCYTOSIS: Status: ACTIVE | Noted: 2018-07-31

## 2018-07-31 PROBLEM — L97.919 ULCER OF RIGHT LOWER EXTREMITY (HCC): Status: ACTIVE | Noted: 2018-07-31

## 2018-07-31 PROBLEM — E66.01 MORBID OBESITY WITH BMI OF 40.0-44.9, ADULT (HCC): Status: ACTIVE | Noted: 2018-07-31

## 2018-07-31 PROBLEM — F17.200 TOBACCO DEPENDENCE: Status: ACTIVE | Noted: 2018-07-31

## 2018-07-31 PROBLEM — E78.5 HLD (HYPERLIPIDEMIA): Status: ACTIVE | Noted: 2018-07-31

## 2018-07-31 PROBLEM — I10 ESSENTIAL HYPERTENSION: Status: ACTIVE | Noted: 2018-07-31

## 2018-07-31 PROBLEM — Z79.01 ANTICOAGULATED: Status: ACTIVE | Noted: 2018-07-31

## 2018-07-31 PROBLEM — L02.611 CELLULITIS AND ABSCESS OF TOE OF RIGHT FOOT: Status: ACTIVE | Noted: 2018-07-31

## 2018-07-31 PROBLEM — D50.9 MICROCYTIC ANEMIA: Status: ACTIVE | Noted: 2018-07-31

## 2018-07-31 PROBLEM — L03.031 CELLULITIS AND ABSCESS OF TOE OF RIGHT FOOT: Status: ACTIVE | Noted: 2018-07-31

## 2018-07-31 PROBLEM — N18.30 STAGE 3 CHRONIC KIDNEY DISEASE (HCC): Status: ACTIVE | Noted: 2018-07-31

## 2018-07-31 PROBLEM — E11.65 UNCONTROLLED DIABETES MELLITUS WITH HYPERGLYCEMIA (HCC): Status: ACTIVE | Noted: 2018-07-31

## 2018-07-31 PROBLEM — R09.89 ABSENT FOOT PULSE: Status: ACTIVE | Noted: 2018-07-31

## 2018-07-31 LAB
ALBUMIN SERPL-MCNC: 3.4 G/DL (ref 3.5–5.2)
ALP BLD-CCNC: 106 U/L (ref 40–129)
ALT SERPL-CCNC: 19 U/L (ref 0–40)
ANION GAP SERPL CALCULATED.3IONS-SCNC: 13 MMOL/L (ref 7–16)
AST SERPL-CCNC: 15 U/L (ref 0–39)
BASOPHILS ABSOLUTE: 0.04 E9/L (ref 0–0.2)
BASOPHILS RELATIVE PERCENT: 0.3 % (ref 0–2)
BILIRUB SERPL-MCNC: 0.7 MG/DL (ref 0–1.2)
BUN BLDV-MCNC: 22 MG/DL (ref 8–23)
CALCIUM SERPL-MCNC: 9 MG/DL (ref 8.6–10.2)
CHLORIDE BLD-SCNC: 95 MMOL/L (ref 98–107)
CO2: 24 MMOL/L (ref 22–29)
CREAT SERPL-MCNC: 1.8 MG/DL (ref 0.7–1.2)
EKG ATRIAL RATE: 83 BPM
EKG P AXIS: 39 DEGREES
EKG P-R INTERVAL: 214 MS
EKG Q-T INTERVAL: 376 MS
EKG QRS DURATION: 98 MS
EKG QTC CALCULATION (BAZETT): 441 MS
EKG R AXIS: 11 DEGREES
EKG T AXIS: 45 DEGREES
EKG VENTRICULAR RATE: 83 BPM
EOSINOPHILS ABSOLUTE: 0.03 E9/L (ref 0.05–0.5)
EOSINOPHILS RELATIVE PERCENT: 0.2 % (ref 0–6)
GFR AFRICAN AMERICAN: 47
GFR NON-AFRICAN AMERICAN: 47 ML/MIN/1.73
GLUCOSE BLD-MCNC: 443 MG/DL (ref 74–109)
HCT VFR BLD CALC: 25.3 % (ref 37–54)
HEMOGLOBIN: 8.4 G/DL (ref 12.5–16.5)
IMMATURE GRANULOCYTES #: 0.13 E9/L
IMMATURE GRANULOCYTES %: 0.8 % (ref 0–5)
INR BLD: 4.9
LACTIC ACID: 2.2 MMOL/L (ref 0.5–2.2)
LYMPHOCYTES ABSOLUTE: 2.21 E9/L (ref 1.5–4)
LYMPHOCYTES RELATIVE PERCENT: 14.2 % (ref 20–42)
MCH RBC QN AUTO: 25.8 PG (ref 26–35)
MCHC RBC AUTO-ENTMCNC: 33.2 % (ref 32–34.5)
MCV RBC AUTO: 77.6 FL (ref 80–99.9)
METER GLUCOSE: 346 MG/DL (ref 70–110)
MONOCYTES ABSOLUTE: 1.2 E9/L (ref 0.1–0.95)
MONOCYTES RELATIVE PERCENT: 7.7 % (ref 2–12)
NEUTROPHILS ABSOLUTE: 11.92 E9/L (ref 1.8–7.3)
NEUTROPHILS RELATIVE PERCENT: 76.8 % (ref 43–80)
PDW BLD-RTO: 15.4 FL (ref 11.5–15)
PLATELET # BLD: 213 E9/L (ref 130–450)
PMV BLD AUTO: 12.1 FL (ref 7–12)
POTASSIUM SERPL-SCNC: 5 MMOL/L (ref 3.5–5)
POTASSIUM SERPL-SCNC: 5.2 MMOL/L (ref 3.5–5)
PROTHROMBIN TIME: 53.7 SEC (ref 9.3–12.4)
RBC # BLD: 3.26 E12/L (ref 3.8–5.8)
SODIUM BLD-SCNC: 132 MMOL/L (ref 132–146)
TOTAL PROTEIN: 8.1 G/DL (ref 6.4–8.3)
WBC # BLD: 15.5 E9/L (ref 4.5–11.5)

## 2018-07-31 PROCEDURE — 85610 PROTHROMBIN TIME: CPT

## 2018-07-31 PROCEDURE — 2500000003 HC RX 250 WO HCPCS: Performed by: FAMILY MEDICINE

## 2018-07-31 PROCEDURE — 93005 ELECTROCARDIOGRAM TRACING: CPT | Performed by: FAMILY MEDICINE

## 2018-07-31 PROCEDURE — 6370000000 HC RX 637 (ALT 250 FOR IP): Performed by: FAMILY MEDICINE

## 2018-07-31 PROCEDURE — 82962 GLUCOSE BLOOD TEST: CPT

## 2018-07-31 PROCEDURE — 83605 ASSAY OF LACTIC ACID: CPT

## 2018-07-31 PROCEDURE — 2580000003 HC RX 258: Performed by: EMERGENCY MEDICINE

## 2018-07-31 PROCEDURE — 93971 EXTREMITY STUDY: CPT

## 2018-07-31 PROCEDURE — 2580000003 HC RX 258: Performed by: FAMILY MEDICINE

## 2018-07-31 PROCEDURE — 84132 ASSAY OF SERUM POTASSIUM: CPT

## 2018-07-31 PROCEDURE — 87040 BLOOD CULTURE FOR BACTERIA: CPT

## 2018-07-31 PROCEDURE — 73502 X-RAY EXAM HIP UNI 2-3 VIEWS: CPT

## 2018-07-31 PROCEDURE — 99221 1ST HOSP IP/OBS SF/LOW 40: CPT | Performed by: SURGERY

## 2018-07-31 PROCEDURE — 72170 X-RAY EXAM OF PELVIS: CPT

## 2018-07-31 PROCEDURE — 6360000002 HC RX W HCPCS: Performed by: FAMILY MEDICINE

## 2018-07-31 PROCEDURE — 99284 EMERGENCY DEPT VISIT MOD MDM: CPT

## 2018-07-31 PROCEDURE — 80053 COMPREHEN METABOLIC PANEL: CPT

## 2018-07-31 PROCEDURE — 73630 X-RAY EXAM OF FOOT: CPT

## 2018-07-31 PROCEDURE — 36415 COLL VENOUS BLD VENIPUNCTURE: CPT

## 2018-07-31 PROCEDURE — 71045 X-RAY EXAM CHEST 1 VIEW: CPT

## 2018-07-31 PROCEDURE — 6360000002 HC RX W HCPCS: Performed by: EMERGENCY MEDICINE

## 2018-07-31 PROCEDURE — 1200000000 HC SEMI PRIVATE

## 2018-07-31 PROCEDURE — 73562 X-RAY EXAM OF KNEE 3: CPT

## 2018-07-31 PROCEDURE — 2580000003 HC RX 258: Performed by: INTERNAL MEDICINE

## 2018-07-31 PROCEDURE — 85025 COMPLETE CBC W/AUTO DIFF WBC: CPT

## 2018-07-31 RX ORDER — CLONIDINE HYDROCHLORIDE 0.1 MG/1
0.2 TABLET ORAL 3 TIMES DAILY
Status: DISCONTINUED | OUTPATIENT
Start: 2018-07-31 | End: 2018-08-07 | Stop reason: HOSPADM

## 2018-07-31 RX ORDER — CLINDAMYCIN PHOSPHATE 600 MG/50ML
600 INJECTION INTRAVENOUS EVERY 8 HOURS
Status: DISCONTINUED | OUTPATIENT
Start: 2018-07-31 | End: 2018-08-01

## 2018-07-31 RX ORDER — SODIUM CHLORIDE 0.9 % (FLUSH) 0.9 %
10 SYRINGE (ML) INJECTION PRN
Status: DISCONTINUED | OUTPATIENT
Start: 2018-07-31 | End: 2018-07-31 | Stop reason: SDUPTHER

## 2018-07-31 RX ORDER — LORAZEPAM 1 MG/1
1 TABLET ORAL NIGHTLY PRN
Status: DISCONTINUED | OUTPATIENT
Start: 2018-07-31 | End: 2018-08-07 | Stop reason: HOSPADM

## 2018-07-31 RX ORDER — ACETAMINOPHEN 325 MG/1
650 TABLET ORAL EVERY 4 HOURS PRN
Status: DISCONTINUED | OUTPATIENT
Start: 2018-07-31 | End: 2018-08-07 | Stop reason: HOSPADM

## 2018-07-31 RX ORDER — DULOXETIN HYDROCHLORIDE 60 MG/1
120 CAPSULE, DELAYED RELEASE ORAL EVERY MORNING
Status: DISCONTINUED | OUTPATIENT
Start: 2018-08-01 | End: 2018-08-07 | Stop reason: HOSPADM

## 2018-07-31 RX ORDER — METOPROLOL TARTRATE 50 MG/1
100 TABLET, FILM COATED ORAL 2 TIMES DAILY
Status: DISCONTINUED | OUTPATIENT
Start: 2018-07-31 | End: 2018-08-07 | Stop reason: HOSPADM

## 2018-07-31 RX ORDER — AMLODIPINE BESYLATE 10 MG/1
10 TABLET ORAL EVERY MORNING
Status: DISCONTINUED | OUTPATIENT
Start: 2018-08-01 | End: 2018-08-07 | Stop reason: HOSPADM

## 2018-07-31 RX ORDER — DEXTROSE MONOHYDRATE 50 MG/ML
100 INJECTION, SOLUTION INTRAVENOUS PRN
Status: DISCONTINUED | OUTPATIENT
Start: 2018-07-31 | End: 2018-08-07 | Stop reason: HOSPADM

## 2018-07-31 RX ORDER — LACTULOSE 10 G/15ML
20 SOLUTION ORAL 2 TIMES DAILY
Status: DISCONTINUED | OUTPATIENT
Start: 2018-07-31 | End: 2018-08-07 | Stop reason: HOSPADM

## 2018-07-31 RX ORDER — SODIUM CHLORIDE 0.9 % (FLUSH) 0.9 %
10 SYRINGE (ML) INJECTION EVERY 12 HOURS SCHEDULED
Status: DISCONTINUED | OUTPATIENT
Start: 2018-07-31 | End: 2018-07-31 | Stop reason: SDUPTHER

## 2018-07-31 RX ORDER — CILOSTAZOL 100 MG/1
100 TABLET ORAL 2 TIMES DAILY
Status: DISCONTINUED | OUTPATIENT
Start: 2018-07-31 | End: 2018-08-07 | Stop reason: HOSPADM

## 2018-07-31 RX ORDER — PREGABALIN 100 MG/1
200 CAPSULE ORAL 3 TIMES DAILY
Status: DISCONTINUED | OUTPATIENT
Start: 2018-07-31 | End: 2018-08-07 | Stop reason: HOSPADM

## 2018-07-31 RX ORDER — MORPHINE SULFATE 2 MG/ML
2 INJECTION, SOLUTION INTRAMUSCULAR; INTRAVENOUS EVERY 4 HOURS PRN
Status: DISCONTINUED | OUTPATIENT
Start: 2018-07-31 | End: 2018-08-07 | Stop reason: HOSPADM

## 2018-07-31 RX ORDER — ATORVASTATIN CALCIUM 10 MG/1
10 TABLET, FILM COATED ORAL DAILY
Status: DISCONTINUED | OUTPATIENT
Start: 2018-07-31 | End: 2018-08-07 | Stop reason: HOSPADM

## 2018-07-31 RX ORDER — NICOTINE 21 MG/24HR
1 PATCH, TRANSDERMAL 24 HOURS TRANSDERMAL DAILY
Status: DISCONTINUED | OUTPATIENT
Start: 2018-07-31 | End: 2018-08-07 | Stop reason: HOSPADM

## 2018-07-31 RX ORDER — SODIUM CHLORIDE 0.9 % (FLUSH) 0.9 %
10 SYRINGE (ML) INJECTION EVERY 12 HOURS SCHEDULED
Status: DISCONTINUED | OUTPATIENT
Start: 2018-07-31 | End: 2018-08-07 | Stop reason: HOSPADM

## 2018-07-31 RX ORDER — OXYCODONE HCL 10 MG/1
10 TABLET, FILM COATED, EXTENDED RELEASE ORAL EVERY 12 HOURS
Status: DISCONTINUED | OUTPATIENT
Start: 2018-07-31 | End: 2018-08-04

## 2018-07-31 RX ORDER — INSULIN GLARGINE 100 [IU]/ML
15 INJECTION, SOLUTION SUBCUTANEOUS 2 TIMES DAILY
Status: DISCONTINUED | OUTPATIENT
Start: 2018-07-31 | End: 2018-08-01

## 2018-07-31 RX ORDER — DEXTROSE MONOHYDRATE 25 G/50ML
12.5 INJECTION, SOLUTION INTRAVENOUS PRN
Status: DISCONTINUED | OUTPATIENT
Start: 2018-07-31 | End: 2018-08-07 | Stop reason: HOSPADM

## 2018-07-31 RX ORDER — HYDROCODONE BITARTRATE AND ACETAMINOPHEN 5; 325 MG/1; MG/1
1 TABLET ORAL EVERY 6 HOURS PRN
Status: DISCONTINUED | OUTPATIENT
Start: 2018-07-31 | End: 2018-08-04

## 2018-07-31 RX ORDER — LISINOPRIL 10 MG/1
10 TABLET ORAL DAILY
Status: DISCONTINUED | OUTPATIENT
Start: 2018-07-31 | End: 2018-08-07 | Stop reason: HOSPADM

## 2018-07-31 RX ORDER — NICOTINE POLACRILEX 4 MG
15 LOZENGE BUCCAL PRN
Status: DISCONTINUED | OUTPATIENT
Start: 2018-07-31 | End: 2018-08-07 | Stop reason: HOSPADM

## 2018-07-31 RX ORDER — SODIUM CHLORIDE 0.9 % (FLUSH) 0.9 %
10 SYRINGE (ML) INJECTION PRN
Status: DISCONTINUED | OUTPATIENT
Start: 2018-07-31 | End: 2018-08-07 | Stop reason: HOSPADM

## 2018-07-31 RX ORDER — PANTOPRAZOLE SODIUM 40 MG/1
40 TABLET, DELAYED RELEASE ORAL
Status: DISCONTINUED | OUTPATIENT
Start: 2018-08-01 | End: 2018-08-07 | Stop reason: HOSPADM

## 2018-07-31 RX ORDER — SPIRONOLACTONE 25 MG/1
50 TABLET ORAL EVERY MORNING
Status: DISCONTINUED | OUTPATIENT
Start: 2018-08-01 | End: 2018-08-07 | Stop reason: HOSPADM

## 2018-07-31 RX ORDER — ONDANSETRON 2 MG/ML
4 INJECTION INTRAMUSCULAR; INTRAVENOUS EVERY 6 HOURS PRN
Status: DISCONTINUED | OUTPATIENT
Start: 2018-07-31 | End: 2018-08-07 | Stop reason: HOSPADM

## 2018-07-31 RX ORDER — TIZANIDINE 4 MG/1
4 TABLET ORAL 3 TIMES DAILY
Status: DISCONTINUED | OUTPATIENT
Start: 2018-07-31 | End: 2018-08-07 | Stop reason: HOSPADM

## 2018-07-31 RX ADMIN — CLONIDINE HYDROCHLORIDE 0.2 MG: 0.1 TABLET ORAL at 22:10

## 2018-07-31 RX ADMIN — HYDROCODONE BITARTRATE AND ACETAMINOPHEN 1 TABLET: 5; 325 TABLET ORAL at 22:10

## 2018-07-31 RX ADMIN — CLINDAMYCIN PHOSPHATE 600 MG: 600 INJECTION, SOLUTION INTRAVENOUS at 22:05

## 2018-07-31 RX ADMIN — INSULIN LISPRO 6 UNITS: 100 INJECTION, SOLUTION INTRAVENOUS; SUBCUTANEOUS at 22:14

## 2018-07-31 RX ADMIN — LORAZEPAM 1 MG: 1 TABLET ORAL at 22:12

## 2018-07-31 RX ADMIN — Medication 10 ML: at 22:13

## 2018-07-31 RX ADMIN — PREGABALIN 200 MG: 100 CAPSULE ORAL at 22:10

## 2018-07-31 RX ADMIN — LINAGLIPTIN 5 MG: 5 TABLET, FILM COATED ORAL at 22:10

## 2018-07-31 RX ADMIN — PIPERACILLIN SODIUM AND TAZOBACTAM SODIUM 3.38 G: 3; .375 INJECTION, POWDER, LYOPHILIZED, FOR SOLUTION INTRAVENOUS at 22:56

## 2018-07-31 RX ADMIN — OXYCODONE HYDROCHLORIDE 10 MG: 10 TABLET, FILM COATED, EXTENDED RELEASE ORAL at 22:17

## 2018-07-31 RX ADMIN — ATORVASTATIN CALCIUM 10 MG: 10 TABLET, FILM COATED ORAL at 22:11

## 2018-07-31 RX ADMIN — CILOSTAZOL 100 MG: 100 TABLET ORAL at 22:11

## 2018-07-31 RX ADMIN — VANCOMYCIN HYDROCHLORIDE 3 G: 1 INJECTION, POWDER, LYOPHILIZED, FOR SOLUTION INTRAVENOUS at 22:04

## 2018-07-31 RX ADMIN — INSULIN GLARGINE 15 UNITS: 100 INJECTION, SOLUTION SUBCUTANEOUS at 22:13

## 2018-07-31 RX ADMIN — TIZANIDINE 4 MG: 4 TABLET ORAL at 22:11

## 2018-07-31 ASSESSMENT — PAIN SCALES - GENERAL
PAINLEVEL_OUTOF10: 0
PAINLEVEL_OUTOF10: 0
PAINLEVEL_OUTOF10: 10
PAINLEVEL_OUTOF10: 10

## 2018-07-31 ASSESSMENT — PAIN DESCRIPTION - ORIENTATION
ORIENTATION: RIGHT
ORIENTATION: RIGHT

## 2018-07-31 ASSESSMENT — PAIN DESCRIPTION - PAIN TYPE
TYPE: ACUTE PAIN
TYPE: ACUTE PAIN

## 2018-07-31 ASSESSMENT — PAIN DESCRIPTION - DESCRIPTORS
DESCRIPTORS: ACHING;CONSTANT;DISCOMFORT
DESCRIPTORS: DISCOMFORT;THROBBING

## 2018-07-31 ASSESSMENT — PAIN DESCRIPTION - LOCATION
LOCATION: LEG;FOOT
LOCATION: LEG;FOOT

## 2018-07-31 ASSESSMENT — PAIN DESCRIPTION - FREQUENCY: FREQUENCY: CONTINUOUS

## 2018-07-31 NOTE — ED PROVIDER NOTES
used smokeless tobacco. He reports that he does not drink alcohol or use drugs. Family History: family history includes Cancer in his mother and sister; Diabetes in his father; Heart Failure in his father; Hypertension in his father. The patients home medications have been reviewed. Allergies: Patient has no known allergies. ---------------------------------------------------PHYSICAL EXAM--------------------------------------    Constitutional/General: Alert and oriented x3, well appearing, non toxic in NAD  Head: Normocephalic and atraumatic  Eyes: PERRL, EOMI, conjunctiva normal  Mouth: Oropharynx clear, handling secretions, no asymmetry of the posterior oropharynx or uvular edema  Neck: Supple, full ROM, non tender to palpation in the midline, no stridor, no crepitus, no meningeal signs  Respiratory: Lungs clear to auscultation bilaterally, no wheezes, rales, or rhonchi. Not in respiratory distress  Cardiovascular:  Regular rate. Regular rhythm. No murmurs, gallops, or rubs. 2+ distal pulses  Chest: No chest wall tenderness  GI:  Abdomen Soft, Non tender, Non distended. +BS. No rebound, guarding, or rigidity. No pulsatile masses. Musculoskeletal: Moves all extremities x 4. Warm and well perfused, severe cellulitis to right foot with break down of skin and ulceration. Tenderness and swelling noted. Lumbar sacral area appears fine, no wound appreciated. Integument: skin warm and dry. No rashes. Neurologic: GCS 15, no focal deficits, symmetric strength in the upper and lower extremities bilaterally  Psychiatric: Normal Affect      -------------------------------------------------- RESULTS -------------------------------------------------  I have personally reviewed all laboratory and imaging results for this patient. Results are listed below.      LABS:  Results for orders placed or performed during the hospital encounter of 07/31/18   CBC Auto Differential   Result Value Ref Range    WBC 15.5 amLODIPine (NORVASC) tablet 10 mg (not administered)   atorvastatin (LIPITOR) tablet 10 mg (10 mg Oral Given 7/31/18 2211)   cilostazol (PLETAL) tablet 100 mg (100 mg Oral Given 7/31/18 2211)   cloNIDine (CATAPRES) tablet 0.2 mg (0.2 mg Oral Given 7/31/18 2210)   DULoxetine (CYMBALTA) extended release capsule 120 mg (not administered)   pantoprazole (PROTONIX) tablet 40 mg (40 mg Oral Given 8/1/18 0542)   insulin glargine (LANTUS) injection vial 15 Units (15 Units Subcutaneous Given 7/31/18 2213)   lactulose (CHRONULAC) 10 GM/15ML solution 20 g (20 g Oral Not Given 7/31/18 2212)   linagliptin (TRADJENTA) tablet 5 mg (5 mg Oral Given 7/31/18 2210)   lisinopril (PRINIVIL;ZESTRIL) tablet 10 mg (10 mg Oral Not Given 7/31/18 2212)   LORazepam (ATIVAN) tablet 1 mg (1 mg Oral Given 7/31/18 2212)   metoprolol tartrate (LOPRESSOR) tablet 100 mg (100 mg Oral Not Given 7/31/18 2219)   oxyCODONE (OXYCONTIN) extended release tablet 10 mg (10 mg Oral Given 7/31/18 2217)   pregabalin (LYRICA) capsule 200 mg (200 mg Oral Given 7/31/18 2210)   spironolactone (ALDACTONE) tablet 50 mg (not administered)   tiZANidine (ZANAFLEX) tablet 4 mg (4 mg Oral Given 7/31/18 2211)   insulin lispro (HUMALOG) injection vial 0-18 Units (not administered)   insulin lispro (HUMALOG) injection vial 0-9 Units (6 Units Subcutaneous Given 7/31/18 2214)   glucose (GLUTOSE) 40 % oral gel 15 g (not administered)   dextrose 50 % solution 12.5 g (not administered)   glucagon (rDNA) injection 1 mg (not administered)   dextrose 5 % solution (not administered)   magnesium hydroxide (MILK OF MAGNESIA) 400 MG/5ML suspension 30 mL (not administered)   ondansetron (ZOFRAN) injection 4 mg (not administered)   clindamycin (CLEOCIN) 600 mg in dextrose 5 % 50 mL IVPB (600 mg Intravenous New Bag 8/1/18 6494)   piperacillin-tazobactam (ZOSYN) 3.375 g in dextrose 5 % 100 mL IVPB extended infusion (mini-bag) (0 g Intravenous Stopped 8/1/18 8312)   morphine (PF) injection 2

## 2018-07-31 NOTE — ED NOTES
FIRST PROVIDER CONTACT ASSESSMENT NOTE      Department of Emergency Medicine   7/31/18  6:04 PM    Chief Complaint: Wound Infection (sent by podiatrist dr Aspen Lindsay for infected right foot wound that has maggots and cellulitis ) and Fall (was here friday for same, states he has tailbone pain, right hip, knee, and leg pain )      History of Present Illness:    Chantel Mojica is a 63 y.o. male who presents to the ED by private car for sent by podiatrist for admission for right foot wound infection   Focused Screening Exam:  Constitutional:  Alert, appears stated age and is in no distress. *ALLERGIES*     Patient has no known allergies.      ED Triage Vitals [07/31/18 1626]   BP Temp Temp Source Pulse Resp SpO2 Height Weight   (!) 153/70 98.8 °F (37.1 °C) Temporal 85 16 100 % 6' 2\" (1.88 m) (!) 343 lb (155.6 kg)        Initial Plan of Care:  Initiate Treatment-Testing, Proceed toTreatment Area When Bed Available for ED Attending/MLP to Continue Care    -----------------END OF FIRST PROVIDER CONTACT ASSESSMENT NOTE--------------  Electronically signed by SHONDA Davis CNP   DD: 7/31/18\       SHONDA Watkins CNP  07/31/18 7544

## 2018-08-01 LAB
ANION GAP SERPL CALCULATED.3IONS-SCNC: 13 MMOL/L (ref 7–16)
BASOPHILS ABSOLUTE: 0.06 E9/L (ref 0–0.2)
BASOPHILS RELATIVE PERCENT: 0.4 % (ref 0–2)
BLOOD CULTURE, ROUTINE: NORMAL
BUN BLDV-MCNC: 20 MG/DL (ref 8–23)
CALCIUM SERPL-MCNC: 9 MG/DL (ref 8.6–10.2)
CHLORIDE BLD-SCNC: 95 MMOL/L (ref 98–107)
CO2: 26 MMOL/L (ref 22–29)
CREAT SERPL-MCNC: 1.8 MG/DL (ref 0.7–1.2)
CULTURE, BLOOD 2: NORMAL
EOSINOPHILS ABSOLUTE: 0.17 E9/L (ref 0.05–0.5)
EOSINOPHILS RELATIVE PERCENT: 1.3 % (ref 0–6)
GFR AFRICAN AMERICAN: 47
GFR NON-AFRICAN AMERICAN: 47 ML/MIN/1.73
GLUCOSE BLD-MCNC: 419 MG/DL (ref 74–109)
HCT VFR BLD CALC: 24.4 % (ref 37–54)
HEMOGLOBIN: 7.8 G/DL (ref 12.5–16.5)
IMMATURE GRANULOCYTES #: 0.08 E9/L
IMMATURE GRANULOCYTES %: 0.6 % (ref 0–5)
INR BLD: 4.8
LYMPHOCYTES ABSOLUTE: 2.49 E9/L (ref 1.5–4)
LYMPHOCYTES RELATIVE PERCENT: 18.4 % (ref 20–42)
MCH RBC QN AUTO: 24.8 PG (ref 26–35)
MCHC RBC AUTO-ENTMCNC: 32 % (ref 32–34.5)
MCV RBC AUTO: 77.7 FL (ref 80–99.9)
METER GLUCOSE: 231 MG/DL (ref 70–110)
METER GLUCOSE: 298 MG/DL (ref 70–110)
METER GLUCOSE: 354 MG/DL (ref 70–110)
METER GLUCOSE: 435 MG/DL (ref 70–110)
MONOCYTES ABSOLUTE: 1.05 E9/L (ref 0.1–0.95)
MONOCYTES RELATIVE PERCENT: 7.8 % (ref 2–12)
NEUTROPHILS ABSOLUTE: 9.68 E9/L (ref 1.8–7.3)
NEUTROPHILS RELATIVE PERCENT: 71.5 % (ref 43–80)
PDW BLD-RTO: 15.3 FL (ref 11.5–15)
PLATELET # BLD: 207 E9/L (ref 130–450)
PMV BLD AUTO: 12.7 FL (ref 7–12)
POTASSIUM REFLEX MAGNESIUM: 4.5 MMOL/L (ref 3.5–5)
PROTHROMBIN TIME: 54.4 SEC (ref 9.3–12.4)
RBC # BLD: 3.14 E12/L (ref 3.8–5.8)
SODIUM BLD-SCNC: 134 MMOL/L (ref 132–146)
WBC # BLD: 13.5 E9/L (ref 4.5–11.5)

## 2018-08-01 PROCEDURE — 0JBQ0ZZ EXCISION OF RIGHT FOOT SUBCUTANEOUS TISSUE AND FASCIA, OPEN APPROACH: ICD-10-PCS | Performed by: PODIATRIST

## 2018-08-01 PROCEDURE — 2500000003 HC RX 250 WO HCPCS: Performed by: FAMILY MEDICINE

## 2018-08-01 PROCEDURE — 85610 PROTHROMBIN TIME: CPT

## 2018-08-01 PROCEDURE — 85025 COMPLETE CBC W/AUTO DIFF WBC: CPT

## 2018-08-01 PROCEDURE — 6370000000 HC RX 637 (ALT 250 FOR IP): Performed by: FAMILY MEDICINE

## 2018-08-01 PROCEDURE — 1200000000 HC SEMI PRIVATE

## 2018-08-01 PROCEDURE — 6370000000 HC RX 637 (ALT 250 FOR IP): Performed by: STUDENT IN AN ORGANIZED HEALTH CARE EDUCATION/TRAINING PROGRAM

## 2018-08-01 PROCEDURE — 82962 GLUCOSE BLOOD TEST: CPT

## 2018-08-01 PROCEDURE — 6360000002 HC RX W HCPCS: Performed by: INTERNAL MEDICINE

## 2018-08-01 PROCEDURE — 2580000003 HC RX 258: Performed by: INTERNAL MEDICINE

## 2018-08-01 PROCEDURE — 6360000002 HC RX W HCPCS: Performed by: FAMILY MEDICINE

## 2018-08-01 PROCEDURE — 36415 COLL VENOUS BLD VENIPUNCTURE: CPT

## 2018-08-01 PROCEDURE — 2580000003 HC RX 258: Performed by: FAMILY MEDICINE

## 2018-08-01 PROCEDURE — 93010 ELECTROCARDIOGRAM REPORT: CPT | Performed by: INTERNAL MEDICINE

## 2018-08-01 PROCEDURE — 80048 BASIC METABOLIC PNL TOTAL CA: CPT

## 2018-08-01 PROCEDURE — 6370000000 HC RX 637 (ALT 250 FOR IP): Performed by: INTERNAL MEDICINE

## 2018-08-01 RX ORDER — ASPIRIN 81 MG/1
81 TABLET, CHEWABLE ORAL DAILY
Status: DISCONTINUED | OUTPATIENT
Start: 2018-08-01 | End: 2018-08-07 | Stop reason: HOSPADM

## 2018-08-01 RX ORDER — INSULIN GLARGINE 100 [IU]/ML
30 INJECTION, SOLUTION SUBCUTANEOUS 2 TIMES DAILY
Status: DISCONTINUED | OUTPATIENT
Start: 2018-08-01 | End: 2018-08-07 | Stop reason: HOSPADM

## 2018-08-01 RX ADMIN — TIZANIDINE 4 MG: 4 TABLET ORAL at 20:37

## 2018-08-01 RX ADMIN — CILOSTAZOL 100 MG: 100 TABLET ORAL at 20:37

## 2018-08-01 RX ADMIN — PIPERACILLIN SODIUM AND TAZOBACTAM SODIUM 3.38 G: 3; .375 INJECTION, POWDER, LYOPHILIZED, FOR SOLUTION INTRAVENOUS at 08:45

## 2018-08-01 RX ADMIN — CLONIDINE HYDROCHLORIDE 0.2 MG: 0.1 TABLET ORAL at 14:15

## 2018-08-01 RX ADMIN — LISINOPRIL 10 MG: 10 TABLET ORAL at 08:51

## 2018-08-01 RX ADMIN — CILOSTAZOL 100 MG: 100 TABLET ORAL at 08:51

## 2018-08-01 RX ADMIN — CLONIDINE HYDROCHLORIDE 0.2 MG: 0.1 TABLET ORAL at 08:49

## 2018-08-01 RX ADMIN — MORPHINE SULFATE 2 MG: 2 INJECTION, SOLUTION INTRAMUSCULAR; INTRAVENOUS at 08:10

## 2018-08-01 RX ADMIN — HYDROCODONE BITARTRATE AND ACETAMINOPHEN 1 TABLET: 5; 325 TABLET ORAL at 19:51

## 2018-08-01 RX ADMIN — HYDROCODONE BITARTRATE AND ACETAMINOPHEN 1 TABLET: 5; 325 TABLET ORAL at 05:42

## 2018-08-01 RX ADMIN — PANTOPRAZOLE SODIUM 40 MG: 40 TABLET, DELAYED RELEASE ORAL at 05:42

## 2018-08-01 RX ADMIN — LINAGLIPTIN 5 MG: 5 TABLET, FILM COATED ORAL at 08:51

## 2018-08-01 RX ADMIN — METOPROLOL TARTRATE 100 MG: 50 TABLET, FILM COATED ORAL at 08:51

## 2018-08-01 RX ADMIN — ASPIRIN 81 MG CHEWABLE TABLET 81 MG: 81 TABLET CHEWABLE at 10:54

## 2018-08-01 RX ADMIN — TIZANIDINE 4 MG: 4 TABLET ORAL at 08:52

## 2018-08-01 RX ADMIN — METOPROLOL TARTRATE 100 MG: 50 TABLET, FILM COATED ORAL at 20:38

## 2018-08-01 RX ADMIN — PIPERACILLIN SODIUM AND TAZOBACTAM SODIUM 3.38 G: 3; .375 INJECTION, POWDER, LYOPHILIZED, FOR SOLUTION INTRAVENOUS at 16:20

## 2018-08-01 RX ADMIN — LORAZEPAM 1 MG: 1 TABLET ORAL at 20:39

## 2018-08-01 RX ADMIN — INSULIN LISPRO 18 UNITS: 100 INJECTION, SOLUTION INTRAVENOUS; SUBCUTANEOUS at 08:00

## 2018-08-01 RX ADMIN — CLINDAMYCIN PHOSPHATE 600 MG: 600 INJECTION, SOLUTION INTRAVENOUS at 05:42

## 2018-08-01 RX ADMIN — PREGABALIN 200 MG: 100 CAPSULE ORAL at 20:37

## 2018-08-01 RX ADMIN — DULOXETINE HYDROCHLORIDE 120 MG: 60 CAPSULE, DELAYED RELEASE ORAL at 08:51

## 2018-08-01 RX ADMIN — INSULIN LISPRO 9 UNITS: 100 INJECTION, SOLUTION INTRAVENOUS; SUBCUTANEOUS at 11:52

## 2018-08-01 RX ADMIN — PIPERACILLIN SODIUM AND TAZOBACTAM SODIUM 3.38 G: 3; .375 INJECTION, POWDER, LYOPHILIZED, FOR SOLUTION INTRAVENOUS at 23:04

## 2018-08-01 RX ADMIN — TIZANIDINE 4 MG: 4 TABLET ORAL at 14:15

## 2018-08-01 RX ADMIN — INSULIN LISPRO 6 UNITS: 100 INJECTION, SOLUTION INTRAVENOUS; SUBCUTANEOUS at 17:01

## 2018-08-01 RX ADMIN — PREGABALIN 200 MG: 100 CAPSULE ORAL at 08:48

## 2018-08-01 RX ADMIN — ENOXAPARIN SODIUM 40 MG: 40 INJECTION SUBCUTANEOUS at 08:48

## 2018-08-01 RX ADMIN — LACTULOSE 20 G: 20 SOLUTION ORAL at 08:48

## 2018-08-01 RX ADMIN — PREGABALIN 200 MG: 100 CAPSULE ORAL at 14:15

## 2018-08-01 RX ADMIN — Medication 10 ML: at 09:00

## 2018-08-01 RX ADMIN — AMLODIPINE BESYLATE 10 MG: 10 TABLET ORAL at 08:51

## 2018-08-01 RX ADMIN — OXYCODONE HYDROCHLORIDE 10 MG: 10 TABLET, FILM COATED, EXTENDED RELEASE ORAL at 23:04

## 2018-08-01 RX ADMIN — INSULIN GLARGINE 30 UNITS: 100 INJECTION, SOLUTION SUBCUTANEOUS at 20:46

## 2018-08-01 RX ADMIN — VANCOMYCIN HYDROCHLORIDE 2000 MG: 10 INJECTION, POWDER, LYOPHILIZED, FOR SOLUTION INTRAVENOUS at 18:00

## 2018-08-01 RX ADMIN — INSULIN LISPRO 7 UNITS: 100 INJECTION, SOLUTION INTRAVENOUS; SUBCUTANEOUS at 20:46

## 2018-08-01 RX ADMIN — CLONIDINE HYDROCHLORIDE 0.2 MG: 0.1 TABLET ORAL at 20:38

## 2018-08-01 RX ADMIN — ATORVASTATIN CALCIUM 10 MG: 10 TABLET, FILM COATED ORAL at 08:52

## 2018-08-01 RX ADMIN — SPIRONOLACTONE 50 MG: 25 TABLET ORAL at 08:52

## 2018-08-01 RX ADMIN — OXYCODONE HYDROCHLORIDE 10 MG: 10 TABLET, FILM COATED, EXTENDED RELEASE ORAL at 08:49

## 2018-08-01 ASSESSMENT — PAIN DESCRIPTION - PAIN TYPE
TYPE: ACUTE PAIN

## 2018-08-01 ASSESSMENT — PAIN SCALES - GENERAL
PAINLEVEL_OUTOF10: 3
PAINLEVEL_OUTOF10: 0
PAINLEVEL_OUTOF10: 8
PAINLEVEL_OUTOF10: 7

## 2018-08-01 ASSESSMENT — PAIN DESCRIPTION - ORIENTATION
ORIENTATION: RIGHT

## 2018-08-01 ASSESSMENT — PAIN DESCRIPTION - LOCATION
LOCATION: FOOT;LEG
LOCATION: FOOT;LEG
LOCATION: FOOT
LOCATION: LEG;FOOT

## 2018-08-01 ASSESSMENT — PAIN DESCRIPTION - DESCRIPTORS
DESCRIPTORS: ACHING;CONSTANT;DISCOMFORT
DESCRIPTORS: ACHING;STABBING;SORE
DESCRIPTORS: SORE
DESCRIPTORS: ACHING;STABBING;SORE

## 2018-08-01 ASSESSMENT — PAIN DESCRIPTION - FREQUENCY
FREQUENCY: CONTINUOUS

## 2018-08-01 ASSESSMENT — PAIN DESCRIPTION - ONSET
ONSET: ON-GOING

## 2018-08-01 ASSESSMENT — PAIN DESCRIPTION - PROGRESSION
CLINICAL_PROGRESSION: NOT CHANGED
CLINICAL_PROGRESSION: NOT CHANGED

## 2018-08-01 NOTE — CONSULTS
symptomatology, a return to medical attention within 2-7 days and further imaging is recommended. . 2. Please note this study does not exclude underlying infection     Xr Foot Right (min 3 Views)    Result Date: 2018  Patient MRN:  [id number] : 1955 Age: 63 years Gender: Male Order Date:  2018 4:30 PM EXAM: XR FOOT RIGHT (MIN 3 VIEWS) INDICATION:  wound, rule out osteo wound, rule out osteo COMPARISON: 2018 NUMBER OF IMAGES:  3 FINDINGS:  3 views of the right foot were obtained again demonstrating amputation of the head of the fourth metatarsal and base of the proximal phalanx of the fourth digit. The osseous structures appear intact. There is soft tissue gas at the plantar aspect of the forefoot. No discrete periosteal reaction or bone destruction is identified. There is an artifact along the surface of the plantar aspect of the foot. There appears to be a dressing in place. CONCLUSION:  1. Stable postoperative changes involving the right foot. 2. Soft tissue gas seen along the plantar aspect of the forefoot concerning for anaerobic infection. There are no plain film findings to indicate acute osteomyelitis at this time. Suggest follow-up if clinically indicated. Us Dup Lower Extremity Right Clark    Result Date: 2018  Patient MRN:  [id number] : 1955 Age: 63 years Gender: Male Order Date:  2018 6:30 PM EXAM: US DUP LOWER EXTREMITY RIGHT CLARK NUMBER OF IMAGES:  33 INDICATION:  dvt  COMPARISON: None FINDINGS: Duplex and color flow Doppler of the right lower extremity venous system was performed. The external iliac, common femoral, femoral, popliteal and trifurcation veins are patent and compressible with normal flow augmentation throughout the vessels. No intraluminal thrombus is seen within the deep veins. There appears to be occlusive thrombus in the proximal, mid and distal greater saphenous vein in the thigh consistent with superficial thrombophlebitis. CONCLUSION: 1.

## 2018-08-01 NOTE — PROGRESS NOTES
Daily Protein (g): 120-130    Estimated Intake vs Estimated Needs: Intake Less Than Needs    Nutrition Risk Level: Moderate    Nutrition Interventions:   Continue current diet (Start diet when medically feasible, pt refused ONS but is aware they can be requested at any time)  Continued Inpatient Monitoring, Education Completed, Coordination of Care (Discussed carb controlled diet and provided handout)    Nutrition Evaluation:   · Evaluation: Goals set   · Goals: pt is to start and tolerate a diet    · Monitoring: Diet Progression, NPO Status, Meal Intake, Diet Tolerance, Skin Integrity, Wound Healing, Fluid Balance, Ascites/Edema, Weight, Comparative Standards, Pertinent Labs    See Adult Nutrition Doc Flowsheet for more detail.      Electronically signed by Sandee Miller RD, SHIRLEY on 8/1/18 at 1:22 PM    Contact Number: [phone]

## 2018-08-01 NOTE — PROGRESS NOTES
Vascular Surgery Progress Note    Pt is being seen in f/u today regarding PVD with non healing wound    Subjective  Pt s/e. No complaints, reports he has remained ambulatory with boot. Continues to smoke. Does not take ASA.     Current Medications:    dextrose        sodium chloride flush, acetaminophen, LORazepam, glucose, dextrose, glucagon (rDNA), dextrose, magnesium hydroxide, ondansetron, morphine, HYDROcodone 5 mg - acetaminophen    sodium chloride flush  10 mL Intravenous 2 times per day    enoxaparin  40 mg Subcutaneous Daily    amLODIPine  10 mg Oral QAM    atorvastatin  10 mg Oral Daily    cilostazol  100 mg Oral BID    cloNIDine  0.2 mg Oral TID    DULoxetine  120 mg Oral QAM    pantoprazole  40 mg Oral QAM AC    insulin glargine  15 Units Subcutaneous BID    lactulose  20 g Oral BID    linagliptin  5 mg Oral Daily    lisinopril  10 mg Oral Daily    metoprolol  100 mg Oral BID    oxyCODONE  10 mg Oral Q12H    pregabalin  200 mg Oral TID    spironolactone  50 mg Oral QAM    tiZANidine  4 mg Oral TID    insulin lispro  0-18 Units Subcutaneous TID WC    insulin lispro  0-9 Units Subcutaneous Nightly    clindamycin (CLEOCIN) IV  600 mg Intravenous Q8H    piperacillin-tazobactam  3.375 g Intravenous Q8H    nicotine  1 patch Transdermal Daily    vancomycin  2,000 mg Intravenous Q18H        PHYSICAL EXAM:    BP (!) 105/51   Pulse 90   Temp 98.4 °F (36.9 °C) (Temporal)   Resp 16   Ht 6' 2\" (1.88 m)   Wt (!) 343 lb (155.6 kg)   SpO2 95%   BMI 44.04 kg/m²     Intake/Output Summary (Last 24 hours) at 08/01/18 0647  Last data filed at 07/31/18 2356   Gross per 24 hour   Intake              660 ml   Output                0 ml   Net              660 ml          Gen: awake, alert and oriented x3, no apparent distress  CVS: RR  Resp: No increased work of breathing  Abd: Soft, non-tender, non-distended  R LE: chronic venous changes/scaling to leg, 2+ edema, 4/5 motor, +parasthesias, dorsal eschar, large wound to sole of foot, strong mono DP/PT  L LE: 5/5 motor, biphasic DP/PT, L great toe wound, +parasthsias, 1+ edema    LABS:    Lab Results   Component Value Date    WBC 15.5 (H) 07/31/2018    HGB 8.4 (L) 07/31/2018    HCT 25.3 (L) 07/31/2018     07/31/2018    PROTIME 54.4 (H) 08/01/2018    INR 4.8 08/01/2018    APTT 61.1 (H) 07/27/2018    K 5.0 07/31/2018    BUN 22 07/31/2018    CREATININE 1.8 (H) 07/31/2018     ABIs PVRs 5/22/18  R PAIGE 0.42  L PAIGE 0.78, TBI 0.56  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 - likely that previously done fem-pop bypasses occluded  Right toe pressures are flat  Evidence of moderate left fem-pop and tibial arterial occlusive disease     5/25 angio    Right   Common Iliac Art Patent   External Iliac Art Patent   Internal Iliac Art Small, diffusely diseased   Common Femoral Art Patent   Superficial Femoral Art     Profunda Femoral Art Patent, well developed   AK Popliteal Art occluded   BK Popliteal Art occluded   Anterior Tibial Art Recons from collateral mid calf but occludes at ankle   Tibioperoneal Trunk Minimal flow, diffusely diseased   Peroneal Art  minimal flow, occluded   Posterior Tibial Art Minimal flow, occluded         A/P  63 y.o. male with PVD non healing wounds to RLE with suspected R foot osteomyelitis    ASA  Continue statin  Recommend smoking cessation  Tight glycemic control - last A1c 13  Podiatry following  Abx per ID  Continue to hold coumadin    As per evaluation in May 2018 - would recommend amputation, likely AKA. Patient is at risk for progressive sepsis and death without.       Harrison Binder

## 2018-08-01 NOTE — PLAN OF CARE
Problem: Nutrition  Goal: Optimal nutrition therapy  Outcome: Ongoing  Nutrition Problem: Increased nutrient needs  Intervention: Food and/or Nutrient Delivery: Continue current diet (Start diet when medically feasible, pt refused ONS but is aware they can be requested at any time)  Nutritional Goals: pt is to start and tolerate a diet

## 2018-08-01 NOTE — CONSULTS
 cilostazol  100 mg Oral BID    cloNIDine  0.2 mg Oral TID    DULoxetine  120 mg Oral QAM    pantoprazole  40 mg Oral QAM AC    lactulose  20 g Oral BID    linagliptin  5 mg Oral Daily    lisinopril  10 mg Oral Daily    metoprolol  100 mg Oral BID    oxyCODONE  10 mg Oral Q12H    pregabalin  200 mg Oral TID    spironolactone  50 mg Oral QAM    tiZANidine  4 mg Oral TID    insulin lispro  0-18 Units Subcutaneous TID WC    insulin lispro  0-9 Units Subcutaneous Nightly    piperacillin-tazobactam  3.375 g Intravenous Q8H    nicotine  1 patch Transdermal Daily    vancomycin  2,000 mg Intravenous Q18H      dextrose       sodium chloride flush, acetaminophen, LORazepam, glucose, dextrose, glucagon (rDNA), dextrose, magnesium hydroxide, ondansetron, morphine, HYDROcodone 5 mg - acetaminophen    Allergies:  Patient has no known allergies. Social History:    Social History     Social History    Marital status: Single     Spouse name: N/A    Number of children: N/A    Years of education: N/A     Occupational History    Not on file. Social History Main Topics    Smoking status: Current Every Day Smoker     Packs/day: 0.50     Years: 7.00    Smokeless tobacco: Never Used      Comment: 5-7 a day     Alcohol use No    Drug use: No    Sexual activity: Not on file     Other Topics Concern    Not on file     Social History Narrative    No narrative on file     Family History:   Family History   Problem Relation Age of Onset    Cancer Mother     Diabetes Father     Heart Failure Father     Hypertension Father     Cancer Sister      PHYSICAL EXAM:      Xr Pelvis (1-2 Views)    Result Date: 2018  Patient MRN: [id number] : 1955 Age:  63 years Gender: Male Order Date: 2018 4:30 PM Exam: XR PELVIS (1-2 VIEWS) Number of Views: 1 Indication:  Wound Infection, pain status post fall Comparison: None. Findings: Moderate joint space loss and acetabular stress study has bilaterally. fields are clear. No pleural effusion or pneumothorax is seen. The heart appears mildly enlarged and there is mild unfolding of the thoracic aorta. There is mild calcification at the aortic arch. The regional bony structures appear intact. There is mild spondylosis in the mid to lower thoracic spine. CONCLUSION: 1. Mild cardiomegaly but no evidence of an acute cardiopulmonary process. 2. Mild aortic atherosclerosis. Us Dup Lower Extremity Right Clark    Result Date: 2018  Patient MRN:  [id number] : 1955 Age: 63 years Gender: Male Order Date:  2018 6:30 PM EXAM: US DUP LOWER EXTREMITY RIGHT CLARK NUMBER OF IMAGES:  33 INDICATION:  dvt  COMPARISON: None FINDINGS: Duplex and color flow Doppler of the right lower extremity venous system was performed. The external iliac, common femoral, femoral, popliteal and trifurcation veins are patent and compressible with normal flow augmentation throughout the vessels. No intraluminal thrombus is seen within the deep veins. There appears to be occlusive thrombus in the proximal, mid and distal greater saphenous vein in the thigh consistent with superficial thrombophlebitis. CONCLUSION: 1. Superficial thrombophlebitis involving the left greater saphenous vein. 2. No evidence of deep venous thrombosis involving the right lower extremity. Vitals:    BP (!) 132/58   Pulse 82   Temp 97.2 °F (36.2 °C) (Temporal)   Resp 18   Ht 6' 2\" (1.88 m)   Wt (!) 329 lb 11.2 oz (149.6 kg)   SpO2 95%   BMI 42.33 kg/m²     Derm: Thin and shiny with loss of hair growth. Left foot intact. Large eschars right lateral and dorsal foot. Large open wound plantar right foot with some mild drainage proximal aspect. No signs of gas in the tissues. Some nonviable fascia noted as well as subcutaneous tissue. NEUROLOGIC: Sensation is absent to feet. VASCULAR: Non palpable dp and pt pulses b/l. MUSCULOSKELETAL: Decreased muscle strength.       Wound Care 08/01/2018    MCV 77.7 08/01/2018    MCH 24.8 08/01/2018    MCHC 32.0 08/01/2018    RDW 15.3 08/01/2018    LYMPHOPCT 18.4 08/01/2018    MONOPCT 7.8 08/01/2018    BASOPCT 0.4 08/01/2018    MONOSABS 1.05 08/01/2018    LYMPHSABS 2.49 08/01/2018    EOSABS 0.17 08/01/2018    BASOSABS 0.06 08/01/2018     WBC:    Lab Results   Component Value Date    WBC 13.5 08/01/2018     Hemoglobin/Hematocrit:    Lab Results   Component Value Date    HGB 7.8 08/01/2018    HCT 24.4 08/01/2018     CMP:    Lab Results   Component Value Date     08/01/2018    K 4.5 08/01/2018    CL 95 08/01/2018    CO2 26 08/01/2018    BUN 20 08/01/2018    CREATININE 1.8 08/01/2018    GFRAA 47 08/01/2018    LABGLOM 47 08/01/2018    GLUCOSE 419 08/01/2018    PROT 8.1 07/31/2018    LABALBU 3.4 07/31/2018    CALCIUM 9.0 08/01/2018    BILITOT 0.7 07/31/2018    ALKPHOS 106 07/31/2018    AST 15 07/31/2018    ALT 19 07/31/2018     BMP:    Lab Results   Component Value Date     08/01/2018    K 4.5 08/01/2018    CL 95 08/01/2018    CO2 26 08/01/2018    BUN 20 08/01/2018    LABALBU 3.4 07/31/2018    CREATININE 1.8 08/01/2018    CALCIUM 9.0 08/01/2018    GFRAA 47 08/01/2018    LABGLOM 47 08/01/2018    GLUCOSE 419 08/01/2018     BUN/Creatinine:    Lab Results   Component Value Date    BUN 20 08/01/2018    CREATININE 1.8 08/01/2018     Uric Acid:  No results found for: URICACID  PT/INR:    Lab Results   Component Value Date    PROTIME 54.4 08/01/2018    INR 4.8 08/01/2018     Warfarin PT/INR:    Lab Results   Component Value Date    PROTIME 54.4 08/01/2018    INR 4.8 08/01/2018     PTT:    Lab Results   Component Value Date    APTT 61.1 07/27/2018   [APTT}  U/A:    Lab Results   Component Value Date    NITRU Negative 06/22/2018    COLORU Yellow 06/22/2018    PHUR 6.0 06/22/2018    WBCUA 1-3 05/23/2018    RBCUA 2-5 05/23/2018    BACTERIA RARE 05/23/2018    CLARITYU Clear 06/22/2018    SPECGRAV <=1.005 06/22/2018    LEUKOCYTESUR Negative 06/22/2018 UROBILINOGEN 0.2 2018    BILIRUBINUR Negative 2018    BLOODU Negative 2018    GLUCOSEU Negative 2018    KETUA Negative 2018    AMORPHOUS FEW 2018     HgBA1c:    Lab Results   Component Value Date    LABA1C 13.3 2018     Urine Toxicology:  No results found for: Devyn Tapia, COCAINESCRN, LABOPIA, LABPHEN  24 Hour Urine for Protein:  No results found for: UTV, HOURSC, URINEVOLUME  24 Hour Urine for Creatinine Clearance:  No results found for: [id number], HOURSC, UTV   Xr Pelvis (1-2 Views)    Result Date: 2018  Patient MRN: [id number] : 1955 Age:  63 years Gender: Male Order Date: 2018 4:30 PM Exam: XR PELVIS (1-2 VIEWS) Number of Views: 1 Indication:  Wound Infection, pain status post fall Comparison: None. Findings: Moderate joint space loss and acetabular stress study has bilaterally. Postoperative changes proximal right lower extremity. No acute fracture. No lytic or blastic bony lesion. 1. Moderate bilateral osteoarthritis of the hips. 2. No acute fracture identified. If there is persistent clinical pain or symptomatology, a return to medical attention within 2-7 days and further imaging is recommended. . 3. Please note this study does not exclude underlying infection or cellulitis. Xr Hip Right (2-3 Views)    Result Date: 2018  Patient MRN: [id number] : 1955 Age:  63 years Gender: Male Order Date: 2018 4:30 PM Exam: XR HIP RIGHT (2-3 VIEWS) Number of Views: 2 Indication:  Wound infection. Cellulitis. Fall. Tailbone, right hip, knee and leg pain. Comparison: Right hip radiographs 2018. Findings: Moderate joint space loss and acetabular sclerosis of the right hip. Surgical changes overlie overlying the right hemipelvis. No acute fracture. No lytic or blastic bony lesion. 1. Moderate right hip osteoarthritis. 2. No acute fracture identified.  If there is persistent clinical pain or symptomatology, a return to medical appear intact. There is soft tissue gas at the plantar aspect of the forefoot. No discrete periosteal reaction or bone destruction is identified. There is an artifact along the surface of the plantar aspect of the foot. There appears to be a dressing in place. CONCLUSION:  1. Stable postoperative changes involving the right foot. 2. Soft tissue gas seen along the plantar aspect of the forefoot concerning for anaerobic infection. There are no plain film findings to indicate acute osteomyelitis at this time. Suggest follow-up if clinically indicated. Xr Chest Portable    Result Date: 2018  Patient MRN:  [id number] : 1955 Age: 63 years Gender: Male Order Date:  2018 8:45 PM EXAM: XR CHEST PORTABLE NUMBER OF IMAGES:  1 INDICATION:  pre op pre op COMPARISON: None FINDINGS:  AP upright portable view of the chest was obtained. The lung fields are clear. No pleural effusion or pneumothorax is seen. The heart appears mildly enlarged and there is mild unfolding of the thoracic aorta. There is mild calcification at the aortic arch. The regional bony structures appear intact. There is mild spondylosis in the mid to lower thoracic spine. CONCLUSION: 1. Mild cardiomegaly but no evidence of an acute cardiopulmonary process. 2. Mild aortic atherosclerosis. Us Dup Lower Extremity Right Clark    Result Date: 2018  Patient MRN:  [id number] : 1955 Age: 63 years Gender: Male Order Date:  2018 6:30 PM EXAM: US DUP LOWER EXTREMITY RIGHT CLARK NUMBER OF IMAGES:  33 INDICATION:  dvt  COMPARISON: None FINDINGS: Duplex and color flow Doppler of the right lower extremity venous system was performed. The external iliac, common femoral, femoral, popliteal and trifurcation veins are patent and compressible with normal flow augmentation throughout the vessels. No intraluminal thrombus is seen within the deep veins.  There appears to be occlusive thrombus in the proximal, mid and distal greater saphenous vein in the thigh consistent with superficial thrombophlebitis. CONCLUSION: 1. Superficial thrombophlebitis involving the left greater saphenous vein. 2. No evidence of deep venous thrombosis involving the right lower extremity. 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 *  Ulcers present on admission   Cellulitis with abscess right foot      Plan:  Exam  Reviewed chart and labs. I obtained informed oral consent and then excised a small portion of the eschar lateral right foot with 11 blade, no pus was noted. Incision was to subcutaneous tissue. Some bleeding noted. I then excised some plantar fascia and subcutaneous tissue plantar foot ulcer with 11 blade. There appears to be some further necrotic tissue proximal to the ulcer. I applied dsd. Patient will need to go back to surgery on Friday for incision and drainage. Due to moderate pvd, I would not recommend stopping anticoagulants for procedure. I explained the procedure, post operative course, and risks of complications. Will follow. Than you for the consult.          Electronically signed by Alexander Perez DPM on 8/1/2018 at 6:30 PM

## 2018-08-01 NOTE — PROGRESS NOTES
membranes, no oropharyngeal erythema or exudate  Neck: supple, trachea midline, no anterior cervical or SC LAD  Heart:  Normal s1/s2, RRR, no murmurs, gallops, or rubs. Lungs: cta  bilaterally,   Abd: bowel sounds present, soft, nontender, nondistended, no masses  Extrem:  No clubbing, cyanosis,  *pos edema RLE, and foul odor coming from foot   Skin: no rashes or lesions  Psych: A & O x3  Neuro: grossly intact, moves all four extremities. Capillary Refill: Brisk,< 3 seconds   Peripheral Pulses: +2 palpable, equal bilaterally               Labs:   Recent Labs      07/31/18 1649 08/01/18   0555   WBC  15.5*  13.5*   HGB  8.4*  7.8*   HCT  25.3*  24.4*   PLT  213  207     Recent Labs      07/31/18   1649  07/31/18 2156  08/01/18   0555   NA  132   --   134   K  5.2*  5.0  4.5   CL  95*   --   95*   CO2  24   --   26   BUN  22   --   20   CREATININE  1.8*   --   1.8*   CALCIUM  9.0   --   9.0     Recent Labs      07/31/18   1649   AST  15   ALT  19   BILITOT  0.7   ALKPHOS  106     Recent Labs      07/31/18 2156 08/01/18   0555   INR  4.9  4.8     No results for input(s): Ada Wright in the last 72 hours.   Recent Labs      07/31/18   1649   AST  15   ALT  19   BILITOT  0.7   ALKPHOS  106     Recent Labs      07/31/18   1649   LACTA  2.2     Lab Results   Component Value Date    LABURIC 6.6 06/05/2018     No results found for: AMMONIA    Assessment:    Active Hospital Problems    Diagnosis Date Noted    Cellulitis and abscess of toe of right foot [L03.031, L02.611] 07/31/2018    PVD (peripheral vascular disease) ([facility] [I73.9] 07/31/2018    Absent foot pulse [R09.89] 07/31/2018    Ulcer of right lower extremity ([facility] [L97.919] 07/31/2018    Leukocytosis [D72.829] 07/31/2018    Microcytic anemia [D50.9] 07/31/2018    Stage 3 chronic kidney disease [N18.3] 07/31/2018    Morbid obesity with BMI of 40.0-44.9, adult ([facility] [E66.01, Z68.41] 07/31/2018    Essential hypertension [I10] 07/31/2018   

## 2018-08-01 NOTE — H&P
osteomyelitis seen. He has been given Vanco and Zosyn. He is being admitted for further management. 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 ([facility] 5/21/2018    Diabetes mellitus ([facility]     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 2 diabetes mellitus, with fat layer exposed ([facility] 5/22/2018    DVT, lower extremity (HCC)     right leg     Hyperlipidemia     Hypertension     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[facility]     PVD (peripheral vascular disease) ([facility]        Past Surgical History:          Procedure Laterality Date    FEMORAL BYPASS      Right - [facility], [address] [place] [address] Road INFEC,1 BURSA Right 5/24/2018    RIGHT FOOT INCISION AND DRAINAGE WITH PARTIAL BONE RESECTION performed by Corie Das DPM at [facility] Right     leg        Medications Prior to Admission:      Prior to Admission medications    Medication Sig Start Date End Date Taking?  Authorizing Provider   insulin glargine (LANTUS) 100 UNIT/ML injection vial Inject 15 Units into the skin 2 times daily 5/30/18   Mary Landers MD   insulin lispro (HUMALOG) 100 UNIT/ML injection vial Inject 0-18 Units into the skin 3 times daily (with meals) 5/30/18   Mary Landers MD   insulin lispro (HUMALOG) 100 UNIT/ML injection vial Inject 0-9 Units into the skin nightly 5/30/18   Mary Landers MD   linagliptin (TRADJENTA) 5 MG tablet Take 1 tablet by mouth daily 5/31/18   Nikole Lisa MD   lisinopril (PRINIVIL;ZESTRIL) 10 MG tablet Take 1 tablet by mouth daily 5/30/18   Mary Landers MD   lactulose (CHRONULAC) 10 GM/15ML solution Take 30 mLs by mouth 2 times daily 5/30/18   Nikole Lisa MD   warfarin (COUMADIN) 10 MG tablet Take 10 mg by mouth three times a week 5 mg 4 times weekly (at night) --Sunday, Tuesday, Thursday, Saturday   10 mg 3 times weekly disease) ([facility] [I73.9] 07/31/2018    Absent foot pulse [R09.89] 07/31/2018    Ulcer of right lower extremity ([facility] [L97.919] 07/31/2018    Leukocytosis [D72.829] 07/31/2018    Microcytic anemia [D50.9] 07/31/2018    Stage 3 chronic kidney disease [N18.3] 07/31/2018    Morbid obesity with BMI of 40.0-44.9, adult ([facility] [E66.01, Z68.41] 07/31/2018    Essential hypertension [I10] 07/31/2018    Tobacco dependence [F17.200] 07/31/2018    Uncontrolled diabetes mellitus with hyperglycemia ([facility] [E11.65] 07/31/2018    HLD (hyperlipidemia) [E78.5] 07/31/2018    History of DVT (deep vein thrombosis) [Z86.718] 07/31/2018    Anticoagulated [Z79.01] 07/31/2018    Hyperkalemia [E87.5] 05/21/2018    Diabetic foot infection ([place] [address] [E11.69, L08.9] 05/21/2018    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 Infected diabetic foot ulcer status post I and D's and wound VAC in the past. IV Vanco and Zosyn at renal dosing plus clindamycin to cover for anaerobic infection. No osteomyelitis on x-ray today. With poor blood flow to the right leg, patient would likely need the right foot amputated. Consult to vascular surgery. Pain control with IV morphine and Percocet. Nothing by mouth after midnight. Doppler ultrasound to rule out DVT. Baseline EKG and CXR. Hold coumadin pre op. Get INR STAT. Restart as soon as possible if not having surgery. #2. Severe peripheral vascular disease with absent right foot dorsalis pedis pulse. Hold Coumadin for now, get INR and restart if not having surgery, consult to vascular surgery. #3. Uncontrolled type II diabetes with hyperglycemia. Resume insulin, add sliding scale, monitor blood sugars. 4. Leukocytosis possibly secondary to ongoing infection in the right foot. Monitor white count. Blood cultures and wound cultures. #5. Microcytic anemia, stable. Monitor hemoglobin.  Likely a combination of chronic disease from renal failure and ongoing bleeding. #6. Stage III chronic kidney disease. Stable. 7. Hypertension, suboptimally controlled, resume home meds. 8. Tobacco dependence, smoking cessation. Nicotine patch. #9. History of DVT on Coumadin. Check INR. 10. Hyperkalemia possibly secondary to renal failure and lisinopril use. Recheck potassium and treat as appropriate. #11. HLD, statin regimen. DVT Prophylaxis: coumadin  Diet:  NPO after MN  Code Status: Prior FULL    PT/OT Eval Status: as needed    Dispo - inpx/med Ana Paula Ortez MD    Thank you No primary care provider on file. for the opportunity to be involved in this patient's care.

## 2018-08-01 NOTE — PLAN OF CARE
Problem: Pain:  Goal: Control of acute pain  Control of acute pain   Outcome: Met This Shift      Problem: Falls - Risk of:  Goal: Absence of physical injury  Absence of physical injury   Outcome: Met This Shift      Problem: Infection:  Goal: Will remain free from infection  Will remain free from infection  Outcome: Met This Shift      Problem: Safety:  Goal: Free from accidental physical injury  Free from accidental physical injury  Outcome: Met This Shift

## 2018-08-02 ENCOUNTER — ANESTHESIA EVENT (OUTPATIENT)
Dept: OPERATING ROOM | Age: 63
DRG: 264 | End: 2018-08-02
Payer: MEDICARE

## 2018-08-02 LAB
BUN BLDV-MCNC: 21 MG/DL (ref 8–23)
CREAT SERPL-MCNC: 2 MG/DL (ref 0.7–1.2)
GFR AFRICAN AMERICAN: 41
GFR NON-AFRICAN AMERICAN: 41 ML/MIN/1.73
INR BLD: 4.2
METER GLUCOSE: 232 MG/DL (ref 70–110)
METER GLUCOSE: 260 MG/DL (ref 70–110)
METER GLUCOSE: 326 MG/DL (ref 70–110)
METER GLUCOSE: 384 MG/DL (ref 70–110)
PROTHROMBIN TIME: 46.8 SEC (ref 9.3–12.4)

## 2018-08-02 PROCEDURE — 6360000002 HC RX W HCPCS: Performed by: INTERNAL MEDICINE

## 2018-08-02 PROCEDURE — 2580000003 HC RX 258: Performed by: INTERNAL MEDICINE

## 2018-08-02 PROCEDURE — 82962 GLUCOSE BLOOD TEST: CPT

## 2018-08-02 PROCEDURE — 6370000000 HC RX 637 (ALT 250 FOR IP): Performed by: FAMILY MEDICINE

## 2018-08-02 PROCEDURE — 2580000003 HC RX 258: Performed by: FAMILY MEDICINE

## 2018-08-02 PROCEDURE — 6360000002 HC RX W HCPCS: Performed by: FAMILY MEDICINE

## 2018-08-02 PROCEDURE — 84520 ASSAY OF UREA NITROGEN: CPT

## 2018-08-02 PROCEDURE — 6370000000 HC RX 637 (ALT 250 FOR IP): Performed by: INTERNAL MEDICINE

## 2018-08-02 PROCEDURE — 6370000000 HC RX 637 (ALT 250 FOR IP): Performed by: STUDENT IN AN ORGANIZED HEALTH CARE EDUCATION/TRAINING PROGRAM

## 2018-08-02 PROCEDURE — 36415 COLL VENOUS BLD VENIPUNCTURE: CPT

## 2018-08-02 PROCEDURE — 1200000000 HC SEMI PRIVATE

## 2018-08-02 PROCEDURE — 82565 ASSAY OF CREATININE: CPT

## 2018-08-02 PROCEDURE — 85610 PROTHROMBIN TIME: CPT

## 2018-08-02 RX ADMIN — INSULIN LISPRO 9 UNITS: 100 INJECTION, SOLUTION INTRAVENOUS; SUBCUTANEOUS at 11:28

## 2018-08-02 RX ADMIN — METOPROLOL TARTRATE 100 MG: 50 TABLET, FILM COATED ORAL at 08:10

## 2018-08-02 RX ADMIN — CLONIDINE HYDROCHLORIDE 0.2 MG: 0.1 TABLET ORAL at 14:16

## 2018-08-02 RX ADMIN — SPIRONOLACTONE 50 MG: 25 TABLET ORAL at 08:10

## 2018-08-02 RX ADMIN — DULOXETINE HYDROCHLORIDE 120 MG: 60 CAPSULE, DELAYED RELEASE ORAL at 08:11

## 2018-08-02 RX ADMIN — ASPIRIN 81 MG CHEWABLE TABLET 81 MG: 81 TABLET CHEWABLE at 08:09

## 2018-08-02 RX ADMIN — PIPERACILLIN SODIUM AND TAZOBACTAM SODIUM 3.38 G: 3; .375 INJECTION, POWDER, LYOPHILIZED, FOR SOLUTION INTRAVENOUS at 07:02

## 2018-08-02 RX ADMIN — TIZANIDINE 4 MG: 4 TABLET ORAL at 21:51

## 2018-08-02 RX ADMIN — INSULIN LISPRO 12 UNITS: 100 INJECTION, SOLUTION INTRAVENOUS; SUBCUTANEOUS at 18:16

## 2018-08-02 RX ADMIN — PANTOPRAZOLE SODIUM 40 MG: 40 TABLET, DELAYED RELEASE ORAL at 06:59

## 2018-08-02 RX ADMIN — PREGABALIN 200 MG: 100 CAPSULE ORAL at 21:55

## 2018-08-02 RX ADMIN — INSULIN GLARGINE 30 UNITS: 100 INJECTION, SOLUTION SUBCUTANEOUS at 08:15

## 2018-08-02 RX ADMIN — Medication 10 ML: at 21:57

## 2018-08-02 RX ADMIN — METOPROLOL TARTRATE 100 MG: 50 TABLET, FILM COATED ORAL at 21:55

## 2018-08-02 RX ADMIN — CLONIDINE HYDROCHLORIDE 0.2 MG: 0.1 TABLET ORAL at 21:55

## 2018-08-02 RX ADMIN — CILOSTAZOL 100 MG: 100 TABLET ORAL at 21:51

## 2018-08-02 RX ADMIN — ATORVASTATIN CALCIUM 10 MG: 10 TABLET, FILM COATED ORAL at 08:09

## 2018-08-02 RX ADMIN — HYDROCODONE BITARTRATE AND ACETAMINOPHEN 1 TABLET: 5; 325 TABLET ORAL at 06:59

## 2018-08-02 RX ADMIN — LISINOPRIL 10 MG: 10 TABLET ORAL at 08:10

## 2018-08-02 RX ADMIN — INSULIN LISPRO 7 UNITS: 100 INJECTION, SOLUTION INTRAVENOUS; SUBCUTANEOUS at 20:34

## 2018-08-02 RX ADMIN — OXYCODONE HYDROCHLORIDE 10 MG: 10 TABLET, FILM COATED, EXTENDED RELEASE ORAL at 21:55

## 2018-08-02 RX ADMIN — PREGABALIN 200 MG: 100 CAPSULE ORAL at 08:09

## 2018-08-02 RX ADMIN — PIPERACILLIN SODIUM AND TAZOBACTAM SODIUM 3.38 G: 3; .375 INJECTION, POWDER, LYOPHILIZED, FOR SOLUTION INTRAVENOUS at 16:46

## 2018-08-02 RX ADMIN — ENOXAPARIN SODIUM 40 MG: 40 INJECTION SUBCUTANEOUS at 08:08

## 2018-08-02 RX ADMIN — INSULIN LISPRO 6 UNITS: 100 INJECTION, SOLUTION INTRAVENOUS; SUBCUTANEOUS at 08:15

## 2018-08-02 RX ADMIN — AMLODIPINE BESYLATE 10 MG: 10 TABLET ORAL at 08:10

## 2018-08-02 RX ADMIN — PREGABALIN 200 MG: 100 CAPSULE ORAL at 14:16

## 2018-08-02 RX ADMIN — VANCOMYCIN HYDROCHLORIDE 2000 MG: 10 INJECTION, POWDER, LYOPHILIZED, FOR SOLUTION INTRAVENOUS at 22:10

## 2018-08-02 RX ADMIN — OXYCODONE HYDROCHLORIDE 10 MG: 10 TABLET, FILM COATED, EXTENDED RELEASE ORAL at 12:08

## 2018-08-02 RX ADMIN — CLONIDINE HYDROCHLORIDE 0.2 MG: 0.1 TABLET ORAL at 08:09

## 2018-08-02 RX ADMIN — LINAGLIPTIN 5 MG: 5 TABLET, FILM COATED ORAL at 08:11

## 2018-08-02 RX ADMIN — INSULIN GLARGINE 30 UNITS: 100 INJECTION, SOLUTION SUBCUTANEOUS at 20:44

## 2018-08-02 RX ADMIN — CILOSTAZOL 100 MG: 100 TABLET ORAL at 08:09

## 2018-08-02 RX ADMIN — TIZANIDINE 4 MG: 4 TABLET ORAL at 09:27

## 2018-08-02 RX ADMIN — TIZANIDINE 4 MG: 4 TABLET ORAL at 14:16

## 2018-08-02 ASSESSMENT — PAIN SCALES - GENERAL
PAINLEVEL_OUTOF10: 0
PAINLEVEL_OUTOF10: 6
PAINLEVEL_OUTOF10: 8
PAINLEVEL_OUTOF10: 6

## 2018-08-02 ASSESSMENT — LIFESTYLE VARIABLES: SMOKING_STATUS: 1

## 2018-08-02 NOTE — PROGRESS NOTES
Hospitalist Progress Note      PCP: No primary care provider on file. Date of Admission: 7/31/2018    Chief Complaint: Infected RLE    Hospital Course: Pt has a know history of cellulitis of toes, thought now to have an acute ostemyelitis.   OR in the am  Subjective:  No complaints       Medications:  Reviewed    Infusion Medications    dextrose       Scheduled Medications    aspirin  81 mg Oral Daily    insulin glargine  30 Units Subcutaneous BID    sodium chloride flush  10 mL Intravenous 2 times per day    enoxaparin  40 mg Subcutaneous Daily    amLODIPine  10 mg Oral QAM    atorvastatin  10 mg Oral Daily    cilostazol  100 mg Oral BID    cloNIDine  0.2 mg Oral TID    DULoxetine  120 mg Oral QAM    pantoprazole  40 mg Oral QAM AC    lactulose  20 g Oral BID    linagliptin  5 mg Oral Daily    lisinopril  10 mg Oral Daily    metoprolol  100 mg Oral BID    oxyCODONE  10 mg Oral Q12H    pregabalin  200 mg Oral TID    spironolactone  50 mg Oral QAM    tiZANidine  4 mg Oral TID    insulin lispro  0-18 Units Subcutaneous TID WC    insulin lispro  0-9 Units Subcutaneous Nightly    piperacillin-tazobactam  3.375 g Intravenous Q8H    nicotine  1 patch Transdermal Daily    vancomycin  2,000 mg Intravenous Q18H     PRN Meds: sodium chloride flush, acetaminophen, LORazepam, glucose, dextrose, glucagon (rDNA), dextrose, magnesium hydroxide, ondansetron, morphine, HYDROcodone 5 mg - acetaminophen      Intake/Output Summary (Last 24 hours) at 08/02/18 1411  Last data filed at 08/02/18 1053   Gross per 24 hour   Intake              720 ml   Output              650 ml   Net               70 ml       Exam:    BP (!) 146/86   Pulse 100   Temp 98.6 °F (37 °C) (Temporal)   Resp 16   Ht 6' 2\" (1.88 m)   Wt (!) 329 lb 11.2 oz (149.6 kg)   SpO2 96%   BMI 42.33 kg/m²       Gen: well developed  HEENT: NC/AT, moist mucous membranes,   Neck: supple, trachea midline, no anterior cervical or SC

## 2018-08-02 NOTE — CARE COORDINATION
NIKKI Discharge planning:    SW spoke with patients daughter in law and she would like patient to go to [facility]. SW made referral, will await decision. Patient to have surgery in the morning. SW to follow.  Franky Osorio

## 2018-08-02 NOTE — CARE COORDINATION
NIKKI Discharge planning:    SW met with patient regarding [facility] choices. Patient stated that his daughter in law would be helping him with [facility] choices. SW placed call to patients daughter in law. Left vm. Will await return call.  Stephen Mendoza

## 2018-08-02 NOTE — CONSULTS
by mouth every 12 hours. .    Historical Provider, MD   spironolactone (ALDACTONE) 50 MG tablet Take 50 mg by mouth every morning     Historical Provider, MD   atorvastatin (LIPITOR) 10 MG tablet Take 10 mg by mouth daily     Historical Provider, MD   esomeprazole (NEXIUM) 40 MG delayed release capsule Take 40 mg by mouth every morning (before breakfast)    Historical Provider, MD   warfarin (COUMADIN) 5 MG tablet Take 5 mg by mouth four times a week 5 mg 4 times weekly (at night) --Sunday, Tuesday, Thursday, Saturday   10 mg 3 times weekly (taken at night)--Monday, Wednesday, Friday    Historical Provider, MD       Allergies:  Patient has no known allergies. Social History:   reports that he has been smoking. He has a 3.50 pack-year smoking history. He has never used smokeless tobacco. He reports that he does not drink alcohol or use drugs. Family History: family history includes Cancer in his mother and sister; Diabetes in his father; Heart Failure in his father; Hypertension in his father. REVIEW OF SYSTEMS:    12 point ROS has been done and pertinent neg and positive has been included in HPI and rest are non contributory        PHYSICAL EXAM:    BP (!) 146/86   Pulse 100   Temp 98.6 °F (37 °C) (Temporal)   Resp 16   Ht 6' 2\" (1.88 m)   Wt (!) 329 lb 11.2 oz (149.6 kg)   SpO2 96%   BMI 42.33 kg/m²    VENT SETTINGS:        General appearance: Alert, Awake, Oriented times 3, no distress  Skin: Warm and dry  Eyes: Pink palpebral conjunctivae. PERRL  Ears: External ears normal. No tragal tenderness. TM intact  Nose/Sinuses: Nares normal. Septum midline. Mucosa normal. No sinus tenderness. Oropharynx: Oropharynx clear with no exudates seen  Neck: Neck supple. No jugular venous distension, lymphadenopathy or thyromegaly Trachea midline  Lungs: Lungs clear to auscultation bilaterally. No rhonchi, crackles or wheezes  Heart: S1 S2  Regular rate and rhythm.  No rub, murmur or gallop  Abdomen: Abdomen

## 2018-08-02 NOTE — PROGRESS NOTES
check trough @ SS if cont'd  · Pharmacist will follow and monitor/adjust dosing as necessary    Thank you for this consult, please page with questions.     Irvin HahnD, BCPS, BCCCP 8/2/2018 10:33 AM  Pager: [phone]

## 2018-08-02 NOTE — ANESTHESIA PRE PROCEDURE
Department of Anesthesiology  Preprocedure Note       Name:  David Silveira   Age:  63 y.o.  :  1955                                          MRN:  [id number]         Date:  2018      Surgeon: Maryann Ruiz):  Aye Gomez DPM    Procedure: Procedure(s):  INCISION AND DRAINAGE MULTIPLE AREAS RIGHT FOOT WITH DEBRIDEMENT SOFT TISSUE    Medications prior to admission:   Prior to Admission medications    Medication Sig Start Date End Date Taking?  Authorizing Provider   insulin glargine (LANTUS) 100 UNIT/ML injection vial Inject 15 Units into the skin 2 times daily 18   Mary Landers MD   insulin lispro (HUMALOG) 100 UNIT/ML injection vial Inject 0-18 Units into the skin 3 times daily (with meals) 18   Mary Landers MD   insulin lispro (HUMALOG) 100 UNIT/ML injection vial Inject 0-9 Units into the skin nightly 18   Mary Landers MD   linagliptin (TRADJENTA) 5 MG tablet Take 1 tablet by mouth daily 18   Ismael Springer MD   lisinopril (PRINIVIL;ZESTRIL) 10 MG tablet Take 1 tablet by mouth daily 18   Ismael Springer MD   lactulose (CHRONULAC) 10 GM/15ML solution Take 30 mLs by mouth 2 times daily 18   Ismael Springer MD   warfarin (COUMADIN) 10 MG tablet Take 10 mg by mouth three times a week 5 mg 4 times weekly (at night) --, Tuesday, Thursday, Saturday   10 mg 3 times weekly (taken at night)--Monday, Wednesday, Friday    Historical Provider, MD   metoprolol (LOPRESSOR) 100 MG tablet Take 100 mg by mouth 2 times daily    Historical Provider, MD   DULoxetine (CYMBALTA) 60 MG extended release capsule Take 120 mg by mouth every morning     Historical Provider, MD   cilostazol (PLETAL) 100 MG tablet Take 100 mg by mouth 2 times daily    Historical Provider, MD   tiZANidine (ZANAFLEX) 4 MG tablet Take 4 mg by mouth 3 times daily    Historical Provider, MD   amLODIPine (NORVASC) 10 MG tablet Take 10 mg by mouth every morning     Historical Provider, MD   LORazepam (ATIVAN) 1 MG tablet Take 1 40.0-44.9, adult ([address] E66.01, Z68.41    Essential hypertension I10    Tobacco dependence F17.200    Uncontrolled diabetes mellitus with hyperglycemia (HCC) E11.65    HLD (hyperlipidemia) E78.5    History of DVT (deep vein thrombosis) Z86.718    Anticoagulated Z79.01       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 ([facility] 5/22/2018    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 ([address] 5/21/2018    Diabetes mellitus ([address]     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 2 diabetes mellitus, with fat layer exposed ([address] 5/22/2018    DVT, lower extremity (HCC)     right leg     Hyperlipidemia     Hypertension     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[address]     PVD (peripheral vascular disease) ([address]        Past Surgical History:        Procedure Laterality Date    FEMORAL BYPASS      Right - [place], [address] [place] [address] Road [facility],1 BURSA Right 5/24/2018    RIGHT FOOT INCISION AND DRAINAGE WITH PARTIAL BONE RESECTION performed by iDanna Dunbar DPM at [facility] Right     leg        Social History:    Social History   Substance Use Topics    Smoking status: Current Every Day Smoker     Packs/day: 0.50     Years: 7.00    Smokeless tobacco: Never Used      Comment: 5-7 a day     Alcohol use No                                Ready to quit: Not Answered  Counseling given: Not Answered      Vital Signs (Current):   Vitals:    08/01/18 2038 08/01/18 2319 08/02/18 0410 08/02/18 0715   BP: (!) 114/51 (!) 98/54 (!) 111/55 (!) 146/86   Pulse: 84 80 80 100   Resp:  18 16 16   Temp:  36.9 °C (98.4 °F) 37.6 °C (99.6 °F) 37 °C (98.6 °F)   TempSrc:  Temporal Temporal Temporal   SpO2:   94% 96%   Weight:       Height:                                                  BP Readings from Last 3 Encounters:   08/02/18 (!) 146/86   07/27/18 (!) 153/82   05/30/18 130/62       NPO Status: and rest SPECT myocardial perfusion   images. Gated study shows normal left ventricular wall motion and   myocardial thickening during systole. Resting left ventricular   ejection fraction is calculated at 61%. Anesthesia Evaluation  Patient summary reviewed and Nursing notes reviewed no history of anesthetic complications:   Airway: Mallampati: III  TM distance: >3 FB   Neck ROM: full  Mouth opening: > = 3 FB Dental: normal exam     Comment: Pt denies chipped or loose teeth    Pulmonary:   (+) pneumonia: resolved,  sleep apnea: on CPAP,  decreased breath sounds,  current smoker          Patient did not smoke on day of surgery. ROS comment: 10 cigarettes per day  Legionnaire's disease   Cardiovascular:    (+) hypertension:, hyperlipidemia      ECG reviewed  Rhythm: regular  Rate: normal                    Neuro/Psych:                ROS comment: Diabetic neuropathy GI/Hepatic/Renal:   (+) GERD: well controlled, renal disease (CKD stage III): CRI, morbid obesity          Endo/Other:    (+) DiabetesType II DM, , : arthritis:., .                  ROS comment: Diabetic foot infection Abdominal:           Vascular:   + PVD, aortic or cerebral, DVT, .                                 Reading Physician Reading Date Result Priority   Genet Chino MD 5/24/2018    Narrative         Transthoracic Echocardiography Report (TTE)     Demographics      Patient Name    Alicia Mary       Gender            Male                   Ariella Pozo  [id number]      Room Number       5145   Number      Account #       [de-identified]     Procedure Date    05/24/2018      Corporate ID                  Ordering          Dunia Warren                                 Physician      Accession       [id number]     Referring   Number                        Physician      Date of Birth   1955    Sonographer       Jerrell AARON      Age             63 year(s)    Interpreting      [address] [place] [address] Physician         Physician Cardiology                                                   Glenn Orourke MD                                    Any Other     Procedure    Type of Study      TTE procedure:Echo Complete W/Doppler & Color Flow. Procedure Date  Date: 05/24/2018 Start: 01:28 PM    Study Location: Portable  Technical Quality: Limited visualization due to body habitus. Indications:LV function. Patient Status: Routine    Height: 74 inches Weight: 344 pounds BSA: 2.74 m^2 BMI: 44.17 kg/m^2    HR: 68 bpm BP: 140/73 mmHg     Findings      Left Ventricle   Left ventricular internal dimensions were normal in diastole and systole. Mild left ventricular concentric hypertrophy noted. No regional wall motion abnormalities seen. Normal left ventricular ejection fraction. Ejection fraction is visually estimated at 65%. Normal left ventricular diastolic filling pattern for age. Right Ventricle   The right ventricle was not clearly visualized. Normal right ventricular size. Left Atrium   Normal sized left atrium. Interatrial septum appears intact. Right Atrium   Right Atrium is not clearly visualized. Normal right atrium size. Mitral Valve   Structurally normal mitral valve. Tricuspid Valve   The tricuspid valve was not well visualized. The tricuspid valve appears structurally normal.      Aortic Valve   The aortic valve leaflets were not well visualized. Aortic valve leaflets are somewhat thickened. Pulmonic Valve   The pulmonic valve was not well visualized. Pericardial Effusion   No evidence of pericardial effusion. Aorta   Aortic root dimension within normal limits. Conclusions      Summary   Left ventricular internal dimensions were normal in diastole and systole. Mild left ventricular concentric hypertrophy noted. No regional wall motion abnormalities seen. Normal left ventricular ejection fraction.    Aortic valve leaflets are somewhat thickened. Signature      ----------------------------------------------------------------   Electronically signed by Satish Hanna MD(Interpreting   physician) on 05/24/2018 03:06 PM          Anesthesia Plan      MAC     ASA 4       Induction: intravenous. Anesthetic plan and risks discussed with patient. Use of blood products discussed with patient whom consented to blood products. Plan discussed with CRNA and attending. Blaise Brannon RN   8/2/2018      Patient seen and examined, chart reviewed, agree with above findings. Anesthetic plan, risks, benefits, alternatives, and personnel involved discussed with patient. Patient verbalized an understanding and agreed to proceed. Anesthetic plan discussed with care team members and agreed upon.     Maxwell Walsh DO   8/3/2018  12:29 PM
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
## 2018-08-03 ENCOUNTER — ANESTHESIA (OUTPATIENT)
Dept: OPERATING ROOM | Age: 63
DRG: 264 | End: 2018-08-03
Payer: MEDICARE

## 2018-08-03 VITALS — SYSTOLIC BLOOD PRESSURE: 124 MMHG | OXYGEN SATURATION: 97 % | DIASTOLIC BLOOD PRESSURE: 79 MMHG

## 2018-08-03 LAB
ABO/RH: NORMAL
ANTIBODY SCREEN: NORMAL
BUN BLDV-MCNC: 21 MG/DL (ref 8–23)
CREAT SERPL-MCNC: 2.1 MG/DL (ref 0.7–1.2)
GFR AFRICAN AMERICAN: 39
GFR NON-AFRICAN AMERICAN: 39 ML/MIN/1.73
INR BLD: 2.3
METER GLUCOSE: 202 MG/DL (ref 70–110)
METER GLUCOSE: 211 MG/DL (ref 70–110)
METER GLUCOSE: 292 MG/DL (ref 70–110)
METER GLUCOSE: 309 MG/DL (ref 70–110)
PROTHROMBIN TIME: 25.5 SEC (ref 9.3–12.4)
VANCOMYCIN TROUGH: 15.9 MCG/ML (ref 5–16)

## 2018-08-03 PROCEDURE — 87077 CULTURE AEROBIC IDENTIFY: CPT

## 2018-08-03 PROCEDURE — 6370000000 HC RX 637 (ALT 250 FOR IP): Performed by: FAMILY MEDICINE

## 2018-08-03 PROCEDURE — 2580000003 HC RX 258: Performed by: INTERNAL MEDICINE

## 2018-08-03 PROCEDURE — 6360000002 HC RX W HCPCS: Performed by: FAMILY MEDICINE

## 2018-08-03 PROCEDURE — 85610 PROTHROMBIN TIME: CPT

## 2018-08-03 PROCEDURE — 87075 CULTR BACTERIA EXCEPT BLOOD: CPT

## 2018-08-03 PROCEDURE — 6360000002 HC RX W HCPCS: Performed by: NURSE ANESTHETIST, CERTIFIED REGISTERED

## 2018-08-03 PROCEDURE — 87070 CULTURE OTHR SPECIMN AEROBIC: CPT

## 2018-08-03 PROCEDURE — 6370000000 HC RX 637 (ALT 250 FOR IP): Performed by: INTERNAL MEDICINE

## 2018-08-03 PROCEDURE — 0LBV0ZZ EXCISION OF RIGHT FOOT TENDON, OPEN APPROACH: ICD-10-PCS | Performed by: PODIATRIST

## 2018-08-03 PROCEDURE — 7100000000 HC PACU RECOVERY - FIRST 15 MIN: Performed by: PODIATRIST

## 2018-08-03 PROCEDURE — 3700000001 HC ADD 15 MINUTES (ANESTHESIA): Performed by: PODIATRIST

## 2018-08-03 PROCEDURE — 7100000001 HC PACU RECOVERY - ADDTL 15 MIN: Performed by: PODIATRIST

## 2018-08-03 PROCEDURE — 36415 COLL VENOUS BLD VENIPUNCTURE: CPT

## 2018-08-03 PROCEDURE — 86901 BLOOD TYPING SEROLOGIC RH(D): CPT

## 2018-08-03 PROCEDURE — 2580000003 HC RX 258: Performed by: NURSE ANESTHETIST, CERTIFIED REGISTERED

## 2018-08-03 PROCEDURE — 82565 ASSAY OF CREATININE: CPT

## 2018-08-03 PROCEDURE — 84520 ASSAY OF UREA NITROGEN: CPT

## 2018-08-03 PROCEDURE — 2500000003 HC RX 250 WO HCPCS: Performed by: NURSE ANESTHETIST, CERTIFIED REGISTERED

## 2018-08-03 PROCEDURE — 3600000002 HC SURGERY LEVEL 2 BASE: Performed by: PODIATRIST

## 2018-08-03 PROCEDURE — 1200000000 HC SEMI PRIVATE

## 2018-08-03 PROCEDURE — 86850 RBC ANTIBODY SCREEN: CPT

## 2018-08-03 PROCEDURE — 2580000003 HC RX 258: Performed by: FAMILY MEDICINE

## 2018-08-03 PROCEDURE — 82962 GLUCOSE BLOOD TEST: CPT

## 2018-08-03 PROCEDURE — 2500000003 HC RX 250 WO HCPCS: Performed by: PODIATRIST

## 2018-08-03 PROCEDURE — 80202 ASSAY OF VANCOMYCIN: CPT

## 2018-08-03 PROCEDURE — 2709999900 HC NON-CHARGEABLE SUPPLY: Performed by: PODIATRIST

## 2018-08-03 PROCEDURE — 0KBV0ZZ EXCISION OF RIGHT FOOT MUSCLE, OPEN APPROACH: ICD-10-PCS | Performed by: PODIATRIST

## 2018-08-03 PROCEDURE — 6360000002 HC RX W HCPCS: Performed by: PODIATRIST

## 2018-08-03 PROCEDURE — 3600000012 HC SURGERY LEVEL 2 ADDTL 15MIN: Performed by: PODIATRIST

## 2018-08-03 PROCEDURE — 87186 SC STD MICRODIL/AGAR DIL: CPT

## 2018-08-03 PROCEDURE — 3700000000 HC ANESTHESIA ATTENDED CARE: Performed by: PODIATRIST

## 2018-08-03 PROCEDURE — 87205 SMEAR GRAM STAIN: CPT

## 2018-08-03 PROCEDURE — 86900 BLOOD TYPING SEROLOGIC ABO: CPT

## 2018-08-03 RX ORDER — LIDOCAINE HYDROCHLORIDE 20 MG/ML
INJECTION, SOLUTION INFILTRATION; PERINEURAL PRN
Status: DISCONTINUED | OUTPATIENT
Start: 2018-08-03 | End: 2018-08-03 | Stop reason: SDUPTHER

## 2018-08-03 RX ORDER — MIDAZOLAM HYDROCHLORIDE 1 MG/ML
INJECTION INTRAMUSCULAR; INTRAVENOUS PRN
Status: DISCONTINUED | OUTPATIENT
Start: 2018-08-03 | End: 2018-08-03 | Stop reason: SDUPTHER

## 2018-08-03 RX ORDER — SODIUM CHLORIDE 9 MG/ML
INJECTION, SOLUTION INTRAVENOUS CONTINUOUS PRN
Status: DISCONTINUED | OUTPATIENT
Start: 2018-08-03 | End: 2018-08-03 | Stop reason: SDUPTHER

## 2018-08-03 RX ORDER — MORPHINE SULFATE 2 MG/ML
1 INJECTION, SOLUTION INTRAMUSCULAR; INTRAVENOUS EVERY 5 MIN PRN
Status: DISCONTINUED | OUTPATIENT
Start: 2018-08-03 | End: 2018-08-03 | Stop reason: HOSPADM

## 2018-08-03 RX ORDER — FENTANYL CITRATE 50 UG/ML
INJECTION, SOLUTION INTRAMUSCULAR; INTRAVENOUS PRN
Status: DISCONTINUED | OUTPATIENT
Start: 2018-08-03 | End: 2018-08-03 | Stop reason: SDUPTHER

## 2018-08-03 RX ORDER — PROPOFOL 10 MG/ML
INJECTION, EMULSION INTRAVENOUS CONTINUOUS PRN
Status: DISCONTINUED | OUTPATIENT
Start: 2018-08-03 | End: 2018-08-03 | Stop reason: SDUPTHER

## 2018-08-03 RX ORDER — BUPIVACAINE HYDROCHLORIDE 5 MG/ML
INJECTION, SOLUTION EPIDURAL; INTRACAUDAL PRN
Status: DISCONTINUED | OUTPATIENT
Start: 2018-08-03 | End: 2018-08-03 | Stop reason: HOSPADM

## 2018-08-03 RX ORDER — MORPHINE SULFATE 2 MG/ML
2 INJECTION, SOLUTION INTRAMUSCULAR; INTRAVENOUS EVERY 5 MIN PRN
Status: DISCONTINUED | OUTPATIENT
Start: 2018-08-03 | End: 2018-08-03 | Stop reason: HOSPADM

## 2018-08-03 RX ADMIN — INSULIN GLARGINE 30 UNITS: 100 INJECTION, SOLUTION SUBCUTANEOUS at 21:18

## 2018-08-03 RX ADMIN — SODIUM CHLORIDE: 9 INJECTION, SOLUTION INTRAVENOUS at 13:18

## 2018-08-03 RX ADMIN — PIPERACILLIN SODIUM AND TAZOBACTAM SODIUM 3.38 G: 3; .375 INJECTION, POWDER, LYOPHILIZED, FOR SOLUTION INTRAVENOUS at 17:00

## 2018-08-03 RX ADMIN — PIPERACILLIN SODIUM AND TAZOBACTAM SODIUM 3.38 G: 3; .375 INJECTION, POWDER, LYOPHILIZED, FOR SOLUTION INTRAVENOUS at 10:28

## 2018-08-03 RX ADMIN — PIPERACILLIN SODIUM AND TAZOBACTAM SODIUM 3.38 G: 3; .375 INJECTION, POWDER, LYOPHILIZED, FOR SOLUTION INTRAVENOUS at 00:35

## 2018-08-03 RX ADMIN — CILOSTAZOL 100 MG: 100 TABLET ORAL at 21:18

## 2018-08-03 RX ADMIN — OXYCODONE HYDROCHLORIDE 10 MG: 10 TABLET, FILM COATED, EXTENDED RELEASE ORAL at 21:19

## 2018-08-03 RX ADMIN — PREGABALIN 200 MG: 100 CAPSULE ORAL at 21:18

## 2018-08-03 RX ADMIN — PROPOFOL 50 MCG/KG/MIN: 10 INJECTION, EMULSION INTRAVENOUS at 13:22

## 2018-08-03 RX ADMIN — Medication 10 ML: at 21:19

## 2018-08-03 RX ADMIN — CLONIDINE HYDROCHLORIDE 0.2 MG: 0.1 TABLET ORAL at 21:18

## 2018-08-03 RX ADMIN — FENTANYL CITRATE 50 MCG: 50 INJECTION, SOLUTION INTRAMUSCULAR; INTRAVENOUS at 13:22

## 2018-08-03 RX ADMIN — INSULIN LISPRO 6 UNITS: 100 INJECTION, SOLUTION INTRAVENOUS; SUBCUTANEOUS at 21:18

## 2018-08-03 RX ADMIN — METOPROLOL TARTRATE 100 MG: 50 TABLET, FILM COATED ORAL at 21:18

## 2018-08-03 RX ADMIN — ENOXAPARIN SODIUM 40 MG: 40 INJECTION SUBCUTANEOUS at 21:21

## 2018-08-03 RX ADMIN — INSULIN LISPRO 6 UNITS: 100 INJECTION, SOLUTION INTRAVENOUS; SUBCUTANEOUS at 16:59

## 2018-08-03 RX ADMIN — HYDROCODONE BITARTRATE AND ACETAMINOPHEN 1 TABLET: 5; 325 TABLET ORAL at 16:59

## 2018-08-03 RX ADMIN — LIDOCAINE HYDROCHLORIDE 60 MG: 20 INJECTION, SOLUTION INFILTRATION; PERINEURAL at 13:22

## 2018-08-03 RX ADMIN — TIZANIDINE 4 MG: 4 TABLET ORAL at 21:18

## 2018-08-03 RX ADMIN — VANCOMYCIN HYDROCHLORIDE 2000 MG: 10 INJECTION, POWDER, LYOPHILIZED, FOR SOLUTION INTRAVENOUS at 22:25

## 2018-08-03 RX ADMIN — MORPHINE SULFATE 2 MG: 2 INJECTION, SOLUTION INTRAMUSCULAR; INTRAVENOUS at 22:17

## 2018-08-03 RX ADMIN — Medication 10 ML: at 10:29

## 2018-08-03 RX ADMIN — MIDAZOLAM HYDROCHLORIDE 2 MG: 1 INJECTION, SOLUTION INTRAMUSCULAR; INTRAVENOUS at 13:18

## 2018-08-03 RX ADMIN — MORPHINE SULFATE 2 MG: 2 INJECTION, SOLUTION INTRAMUSCULAR; INTRAVENOUS at 18:10

## 2018-08-03 ASSESSMENT — PULMONARY FUNCTION TESTS
PIF_VALUE: 0
PIF_VALUE: 0
PIF_VALUE: 1
PIF_VALUE: 0

## 2018-08-03 ASSESSMENT — PAIN DESCRIPTION - PAIN TYPE
TYPE: ACUTE PAIN;SURGICAL PAIN

## 2018-08-03 ASSESSMENT — PAIN SCALES - GENERAL
PAINLEVEL_OUTOF10: 9
PAINLEVEL_OUTOF10: 10
PAINLEVEL_OUTOF10: 0
PAINLEVEL_OUTOF10: 6
PAINLEVEL_OUTOF10: 0
PAINLEVEL_OUTOF10: 9

## 2018-08-03 ASSESSMENT — PAIN DESCRIPTION - ORIENTATION
ORIENTATION: RIGHT

## 2018-08-03 ASSESSMENT — PAIN DESCRIPTION - DESCRIPTORS
DESCRIPTORS: ACHING;DISCOMFORT;CONSTANT

## 2018-08-03 ASSESSMENT — PAIN DESCRIPTION - LOCATION
LOCATION: FOOT

## 2018-08-03 NOTE — PROGRESS NOTES
Hospitalist Progress Note      PCP: No primary care provider on file. Date of Admission: 7/31/2018    Chief Complaint:  Infected RLE    Hospital Course: *Pt has a known history of cellulitis of toes, thought now to have an acute ostemyelitis. OR  Today, needs an amputation , but pt refuses.    Subjective:  No complaints **            Medications:  Reviewed    Infusion Medications    dextrose       Scheduled Medications    vancomycin  2,000 mg Intravenous Q24H    [START ON 8/4/2018] enoxaparin  40 mg Subcutaneous Daily    aspirin  81 mg Oral Daily    insulin glargine  30 Units Subcutaneous BID    sodium chloride flush  10 mL Intravenous 2 times per day    amLODIPine  10 mg Oral QAM    atorvastatin  10 mg Oral Daily    cilostazol  100 mg Oral BID    cloNIDine  0.2 mg Oral TID    DULoxetine  120 mg Oral QAM    pantoprazole  40 mg Oral QAM AC    lactulose  20 g Oral BID    linagliptin  5 mg Oral Daily    lisinopril  10 mg Oral Daily    metoprolol  100 mg Oral BID    oxyCODONE  10 mg Oral Q12H    pregabalin  200 mg Oral TID    spironolactone  50 mg Oral QAM    tiZANidine  4 mg Oral TID    insulin lispro  0-18 Units Subcutaneous TID WC    insulin lispro  0-9 Units Subcutaneous Nightly    piperacillin-tazobactam  3.375 g Intravenous Q8H    nicotine  1 patch Transdermal Daily     PRN Meds: morphine, morphine, sodium chloride flush, acetaminophen, LORazepam, glucose, dextrose, glucagon (rDNA), dextrose, magnesium hydroxide, ondansetron, morphine, HYDROcodone 5 mg - acetaminophen      Intake/Output Summary (Last 24 hours) at 08/03/18 1357  Last data filed at 08/03/18 0730   Gross per 24 hour   Intake                0 ml   Output              300 ml   Net             -300 ml       Exam:    BP (!) 125/57   Pulse 73   Temp 100.3 °F (37.9 °C) (Temporal)   Resp 16   Ht 6' 2\" (1.88 m)   Wt (!) 346 lb (156.9 kg)   SpO2 95%   BMI 44.42 kg/m²       Gen: well developed  HEENT: NC/AT, moist mucous membranes,   Neck: supple, trachea midline, no anterior cervical or SC LAD  Heart:  Normal s1/s2, RRR, no murmurs, gallops, or rubs. Lungs: cta  bilaterally,   Abd: bowel sounds present, soft, nontender,  Extrem:  No clubbing, cyanosis,  *pos edema RLE, and foul odor coming from foot   Skin: no rashes or lesions  Psych: A & O x3  Neuro: grossly intact, moves all four extremities.    Capillary Refill: Brisk,< 3 seconds   Peripheral Pulses: +2 palpable, equal bilaterally                   Labs:   Recent Labs      07/31/18   1649  08/01/18   0555   WBC  15.5*  13.5*   HGB  8.4*  7.8*   HCT  25.3*  24.4*   PLT  213  207     Recent Labs      07/31/18   1649  07/31/18   2156  08/01/18   0555  08/02/18   0514  08/03/18   1130   NA  132   --   134   --    --    K  5.2*  5.0  4.5   --    --    CL  95*   --   95*   --    --    CO2  24   --   26   --    --    BUN  22   --   20  21  21   CREATININE  1.8*   --   1.8*  2.0*  2.1*   CALCIUM  9.0   --   9.0   --    --      Recent Labs      07/31/18   1649   AST  15   ALT  19   BILITOT  0.7   ALKPHOS  106     Recent Labs      08/01/18   0555  08/02/18   0514  08/03/18   0512   INR  4.8  4.2  2.3     No results for input(s): Kathleen Huang in the last 72 hours.   Recent Labs      07/31/18   1649   AST  15   ALT  19   BILITOT  0.7   ALKPHOS  106     Recent Labs      07/31/18   1649   LACTA  2.2     Lab Results   Component Value Date    LABURIC 6.6 06/05/2018     No results found for: AMMONIA    Assessment:    Active Hospital Problems    Diagnosis Date Noted    Cellulitis and abscess of toe of right foot [L03.031, L02.611] 07/31/2018    PVD (peripheral vascular disease) ([facility] [I73.9] 07/31/2018    Absent foot pulse [R09.89] 07/31/2018    Ulcer of right lower extremity ([facility] [L97.919] 07/31/2018    Leukocytosis [D72.829] 07/31/2018    Microcytic anemia [D50.9] 07/31/2018    Stage 3 chronic kidney disease [N18.3] 07/31/2018    Morbid obesity with BMI of 40.0-44.9, adult

## 2018-08-03 NOTE — PROGRESS NOTES
given late last night - rescheduled tonight's dose for 2200    Plan:  · Given increase in sCr, will check a vancomycin level prior to tonight's dose  · Ok to continue vancomycin 2g IV Q24h if trough result is less than 20 mCg/mL  · Pharmacist will follow and monitor/adjust dosing as necessary    Thank you for this consult, please page with questions.     Camille Sanchez, PharmD, BCPS, [facility] 8/3/2018 3:46 PM  Pager: [phone]

## 2018-08-04 LAB
BUN BLDV-MCNC: 18 MG/DL (ref 8–23)
CREAT SERPL-MCNC: 2 MG/DL (ref 0.7–1.2)
GFR AFRICAN AMERICAN: 41
GFR NON-AFRICAN AMERICAN: 41 ML/MIN/1.73
GRAM STAIN ORDERABLE: NORMAL
INR BLD: 1.9
METER GLUCOSE: 241 MG/DL (ref 70–110)
METER GLUCOSE: 272 MG/DL (ref 70–110)
METER GLUCOSE: 279 MG/DL (ref 70–110)
METER GLUCOSE: 336 MG/DL (ref 70–110)
PROTHROMBIN TIME: 20.8 SEC (ref 9.3–12.4)

## 2018-08-04 PROCEDURE — 82962 GLUCOSE BLOOD TEST: CPT

## 2018-08-04 PROCEDURE — 1200000000 HC SEMI PRIVATE

## 2018-08-04 PROCEDURE — 2580000003 HC RX 258: Performed by: FAMILY MEDICINE

## 2018-08-04 PROCEDURE — 36415 COLL VENOUS BLD VENIPUNCTURE: CPT

## 2018-08-04 PROCEDURE — 6360000002 HC RX W HCPCS: Performed by: FAMILY MEDICINE

## 2018-08-04 PROCEDURE — 84520 ASSAY OF UREA NITROGEN: CPT

## 2018-08-04 PROCEDURE — 82565 ASSAY OF CREATININE: CPT

## 2018-08-04 PROCEDURE — 2580000003 HC RX 258: Performed by: INTERNAL MEDICINE

## 2018-08-04 PROCEDURE — 6370000000 HC RX 637 (ALT 250 FOR IP): Performed by: INTERNAL MEDICINE

## 2018-08-04 PROCEDURE — 6370000000 HC RX 637 (ALT 250 FOR IP): Performed by: STUDENT IN AN ORGANIZED HEALTH CARE EDUCATION/TRAINING PROGRAM

## 2018-08-04 PROCEDURE — 6370000000 HC RX 637 (ALT 250 FOR IP): Performed by: FAMILY MEDICINE

## 2018-08-04 PROCEDURE — 6360000002 HC RX W HCPCS: Performed by: PODIATRIST

## 2018-08-04 PROCEDURE — 85610 PROTHROMBIN TIME: CPT

## 2018-08-04 RX ORDER — WARFARIN SODIUM 5 MG/1
5 TABLET ORAL DAILY
Status: DISCONTINUED | OUTPATIENT
Start: 2018-08-04 | End: 2018-08-05

## 2018-08-04 RX ORDER — HYDROCODONE BITARTRATE AND ACETAMINOPHEN 5; 325 MG/1; MG/1
1 TABLET ORAL EVERY 4 HOURS PRN
Status: DISCONTINUED | OUTPATIENT
Start: 2018-08-04 | End: 2018-08-07 | Stop reason: HOSPADM

## 2018-08-04 RX ORDER — OXYCODONE HCL 20 MG/1
20 TABLET, FILM COATED, EXTENDED RELEASE ORAL EVERY 12 HOURS
Status: DISCONTINUED | OUTPATIENT
Start: 2018-08-04 | End: 2018-08-07 | Stop reason: HOSPADM

## 2018-08-04 RX ADMIN — PANTOPRAZOLE SODIUM 40 MG: 40 TABLET, DELAYED RELEASE ORAL at 07:05

## 2018-08-04 RX ADMIN — PIPERACILLIN SODIUM AND TAZOBACTAM SODIUM 3.38 G: 3; .375 INJECTION, POWDER, LYOPHILIZED, FOR SOLUTION INTRAVENOUS at 01:29

## 2018-08-04 RX ADMIN — CILOSTAZOL 100 MG: 100 TABLET ORAL at 09:34

## 2018-08-04 RX ADMIN — INSULIN LISPRO 5 UNITS: 100 INJECTION, SOLUTION INTRAVENOUS; SUBCUTANEOUS at 21:54

## 2018-08-04 RX ADMIN — ASPIRIN 81 MG CHEWABLE TABLET 81 MG: 81 TABLET CHEWABLE at 09:33

## 2018-08-04 RX ADMIN — HYDROCODONE BITARTRATE AND ACETAMINOPHEN 1 TABLET: 5; 325 TABLET ORAL at 00:10

## 2018-08-04 RX ADMIN — CLONIDINE HYDROCHLORIDE 0.2 MG: 0.1 TABLET ORAL at 16:08

## 2018-08-04 RX ADMIN — INSULIN GLARGINE 30 UNITS: 100 INJECTION, SOLUTION SUBCUTANEOUS at 21:53

## 2018-08-04 RX ADMIN — ATORVASTATIN CALCIUM 10 MG: 10 TABLET, FILM COATED ORAL at 09:37

## 2018-08-04 RX ADMIN — TIZANIDINE 4 MG: 4 TABLET ORAL at 09:33

## 2018-08-04 RX ADMIN — ENOXAPARIN SODIUM 40 MG: 40 INJECTION SUBCUTANEOUS at 09:32

## 2018-08-04 RX ADMIN — CLONIDINE HYDROCHLORIDE 0.2 MG: 0.1 TABLET ORAL at 09:33

## 2018-08-04 RX ADMIN — MORPHINE SULFATE 2 MG: 2 INJECTION, SOLUTION INTRAMUSCULAR; INTRAVENOUS at 16:24

## 2018-08-04 RX ADMIN — TIZANIDINE 4 MG: 4 TABLET ORAL at 16:08

## 2018-08-04 RX ADMIN — DULOXETINE HYDROCHLORIDE 120 MG: 60 CAPSULE, DELAYED RELEASE ORAL at 09:34

## 2018-08-04 RX ADMIN — INSULIN LISPRO 6 UNITS: 100 INJECTION, SOLUTION INTRAVENOUS; SUBCUTANEOUS at 09:40

## 2018-08-04 RX ADMIN — PREGABALIN 200 MG: 100 CAPSULE ORAL at 21:46

## 2018-08-04 RX ADMIN — INSULIN LISPRO 12 UNITS: 100 INJECTION, SOLUTION INTRAVENOUS; SUBCUTANEOUS at 18:05

## 2018-08-04 RX ADMIN — LISINOPRIL 10 MG: 10 TABLET ORAL at 09:34

## 2018-08-04 RX ADMIN — LINAGLIPTIN 5 MG: 5 TABLET, FILM COATED ORAL at 09:34

## 2018-08-04 RX ADMIN — CLONIDINE HYDROCHLORIDE 0.2 MG: 0.1 TABLET ORAL at 21:46

## 2018-08-04 RX ADMIN — HYDROCODONE BITARTRATE AND ACETAMINOPHEN 1 TABLET: 5; 325 TABLET ORAL at 11:27

## 2018-08-04 RX ADMIN — VANCOMYCIN HYDROCHLORIDE 2000 MG: 10 INJECTION, POWDER, LYOPHILIZED, FOR SOLUTION INTRAVENOUS at 21:53

## 2018-08-04 RX ADMIN — Medication 10 ML: at 21:48

## 2018-08-04 RX ADMIN — HYDROCODONE BITARTRATE AND ACETAMINOPHEN 1 TABLET: 5; 325 TABLET ORAL at 18:05

## 2018-08-04 RX ADMIN — PREGABALIN 200 MG: 100 CAPSULE ORAL at 09:33

## 2018-08-04 RX ADMIN — SPIRONOLACTONE 50 MG: 25 TABLET ORAL at 09:33

## 2018-08-04 RX ADMIN — OXYCODONE HYDROCHLORIDE 20 MG: 20 TABLET, FILM COATED, EXTENDED RELEASE ORAL at 21:52

## 2018-08-04 RX ADMIN — AMLODIPINE BESYLATE 10 MG: 10 TABLET ORAL at 09:33

## 2018-08-04 RX ADMIN — Medication 10 ML: at 09:38

## 2018-08-04 RX ADMIN — METOPROLOL TARTRATE 100 MG: 50 TABLET, FILM COATED ORAL at 09:34

## 2018-08-04 RX ADMIN — OXYCODONE HYDROCHLORIDE 10 MG: 10 TABLET, FILM COATED, EXTENDED RELEASE ORAL at 09:33

## 2018-08-04 RX ADMIN — METOPROLOL TARTRATE 100 MG: 50 TABLET, FILM COATED ORAL at 21:46

## 2018-08-04 RX ADMIN — WARFARIN SODIUM 5 MG: 5 TABLET ORAL at 19:49

## 2018-08-04 RX ADMIN — CILOSTAZOL 100 MG: 100 TABLET ORAL at 21:46

## 2018-08-04 RX ADMIN — TIZANIDINE 4 MG: 4 TABLET ORAL at 21:47

## 2018-08-04 RX ADMIN — PREGABALIN 200 MG: 100 CAPSULE ORAL at 16:08

## 2018-08-04 RX ADMIN — INSULIN GLARGINE 30 UNITS: 100 INJECTION, SOLUTION SUBCUTANEOUS at 09:40

## 2018-08-04 RX ADMIN — PIPERACILLIN SODIUM AND TAZOBACTAM SODIUM 3.38 G: 3; .375 INJECTION, POWDER, LYOPHILIZED, FOR SOLUTION INTRAVENOUS at 09:32

## 2018-08-04 RX ADMIN — PIPERACILLIN SODIUM AND TAZOBACTAM SODIUM 3.38 G: 3; .375 INJECTION, POWDER, LYOPHILIZED, FOR SOLUTION INTRAVENOUS at 16:27

## 2018-08-04 ASSESSMENT — PAIN DESCRIPTION - DESCRIPTORS: DESCRIPTORS: ACHING

## 2018-08-04 ASSESSMENT — PAIN SCALES - GENERAL
PAINLEVEL_OUTOF10: 8
PAINLEVEL_OUTOF10: 10
PAINLEVEL_OUTOF10: 0
PAINLEVEL_OUTOF10: 0
PAINLEVEL_OUTOF10: 10
PAINLEVEL_OUTOF10: 8
PAINLEVEL_OUTOF10: 10
PAINLEVEL_OUTOF10: 7

## 2018-08-04 ASSESSMENT — PAIN DESCRIPTION - PROGRESSION: CLINICAL_PROGRESSION: NOT CHANGED

## 2018-08-04 ASSESSMENT — PAIN DESCRIPTION - FREQUENCY: FREQUENCY: CONTINUOUS

## 2018-08-04 ASSESSMENT — PAIN DESCRIPTION - ORIENTATION: ORIENTATION: RIGHT

## 2018-08-04 ASSESSMENT — PAIN DESCRIPTION - LOCATION: LOCATION: FOOT

## 2018-08-04 ASSESSMENT — PAIN DESCRIPTION - PAIN TYPE: TYPE: ACUTE PAIN

## 2018-08-04 NOTE — OP NOTE
[address]                   [address] [facility], [address] [place] [address]                                 OPERATIVE REPORT    PATIENT NAME: Audra Howard                    :        1955  MED REC NO:   [id number]                            ROOM:       5417  ACCOUNT NO:   [de-identified]                           ADMIT DATE: 2018  PROVIDER:     Theo Ferreira Dpm    DATE OF PROCEDURE:  2018    PREOPERATIVE DIAGNOSIS:  Abscess with necrotic tissue of the right foot. POSTOPERATIVE DIAGNOSIS:  Abscess with necrotic tissue of the right foot. OPERATION PERFORMED:  Incision and drainage of multiple areas of the left  foot with excision of soft tissue including subcutaneous tissue, tendon,  muscle and plantar fascia. SURGEON:  Theo Ferreira DPM    ASSISTANTS:  None. ANESTHESIA:  Local MAC. HEMOSTASIS:  Tourniquet not utilized. ESTIMATED BLOOD LOSS:  20 mL. COMPLICATIONS:  None. SPECIMENS:  Deep wound cultures. INDICATIONS:  A 27-year-old male with progressive infection of the right  foot. He had been doing well. He does have severe vascular disease and is  at risk of losing the extremity. He wishes to exhaust all options before  amputation of the leg. I explained the patient procedure, postoperative  course, risks, and complication. No guarantees were given. All questions  were answered. The patient elects to have surgical intervention. OPERATIVE PROCEDURE:  Under mild sedation, the patient was brought into the  operating room. Local anesthesia was then obtained about the right foot  with 10 mL of Marcaine plain. The right foot was scrubbed, prepped, and  draped in the usual aseptic manner. Attention was then directed towards  the lateral aspect of the right foot. There was a large ulcer with an  eschar noted.   A 15 blade was utilized to excise the portion of the this  eschar down to subcutaneous tissue, removing some of

## 2018-08-04 NOTE — PROGRESS NOTES
Hospitalist Progress Note      PCP: No primary care provider on file. Date of Admission: 7/31/2018    Chief Complaint:  Infected RLE    Hospital Course: *Pt has a known history of cellulitis of toes, thought now to have an acute ostemyelitis.  OR  Today, needs an amputation , but pt refuses. Had  Incision and drainage of multiple areas of the left  foot with excision of soft tissue including subcutaneous tissue, tendon,  muscle and plantar fascia.     Subjective:  States pain meds not working       Medications:  Reviewed    Infusion Medications    dextrose       Scheduled Medications    oxyCODONE  20 mg Oral Q12H    warfarin  5 mg Oral Daily    vancomycin  2,000 mg Intravenous Q24H    aspirin  81 mg Oral Daily    insulin glargine  30 Units Subcutaneous BID    sodium chloride flush  10 mL Intravenous 2 times per day    amLODIPine  10 mg Oral QAM    atorvastatin  10 mg Oral Daily    cilostazol  100 mg Oral BID    cloNIDine  0.2 mg Oral TID    DULoxetine  120 mg Oral QAM    pantoprazole  40 mg Oral QAM AC    lactulose  20 g Oral BID    linagliptin  5 mg Oral Daily    lisinopril  10 mg Oral Daily    metoprolol  100 mg Oral BID    pregabalin  200 mg Oral TID    spironolactone  50 mg Oral QAM    tiZANidine  4 mg Oral TID    insulin lispro  0-18 Units Subcutaneous TID WC    insulin lispro  0-9 Units Subcutaneous Nightly    piperacillin-tazobactam  3.375 g Intravenous Q8H    nicotine  1 patch Transdermal Daily     PRN Meds: HYDROcodone 5 mg - acetaminophen, sodium chloride flush, acetaminophen, LORazepam, glucose, dextrose, glucagon (rDNA), dextrose, magnesium hydroxide, ondansetron, morphine      Intake/Output Summary (Last 24 hours) at 08/04/18 1355  Last data filed at 08/04/18 0649   Gross per 24 hour   Intake              250 ml   Output             1200 ml   Net             -950 ml       Exam:    BP (!) 114/55   Pulse 71   Temp 98.8 °F (37.1 °C) (Temporal)   Resp 18   Ht 6' 2\" (1.88 (hyperlipidemia) [E78.5] 07/31/2018    History of DVT (deep vein thrombosis) [Z86.718] 07/31/2018    Anticoagulated [Z79.01] 07/31/2018    Hyperkalemia [E87.5] 05/21/2018    Di[facility] foot [facility] [G58.273, L08.9] 05/21/2018    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 ([facility] [I70.65] 05/21/2018   s/p I&D of abscess right lower extrem   Type 2 diabetes w diabetic peripheral angiopathy w gangrene in the setting of \"Soft tissue gas seen along the plantar aspect of the forefoot concerning for anaerobic infection\" being treated with IV abx, ID consult     Plan:  Cont norvasc   cont asa   cont lipitor   cont zosyn and vanc   cont catapress   cont SSI   cont zestril   restart coumadin     DVT Prophylaxis:  coumadin  Diet: cho controlled  Code Status: Full Code     PT/OT Eval Status: ordered     Dispo - home at discharge           Electronically signed by Natali Agustin DO on 8/4/2018 at 1:55 PM

## 2018-08-04 NOTE — PROGRESS NOTES
[address] [place] [address] [facility]  Progress Note    SUBJECTIVE:  Chief Complaint   Patient presents with    Wound Infection     sent by podiatrist dr Apolinar Schilder for infected right foot wound that has maggots and cellulitis     Fall     was here friday for same, states he has tailbone pain, right hip, knee, and leg pain      Patient is tolerating medications. No reported adverse drug reactions. No nausea, vomiting, diarrhea. Afebrile. No complaints. Review of systems:  As stated above in the chief complaint, otherwise negative.     Medications:  Scheduled Meds:   enoxaparin  40 mg Subcutaneous Daily    vancomycin  2,000 mg Intravenous Q24H    aspirin  81 mg Oral Daily    insulin glargine  30 Units Subcutaneous BID    sodium chloride flush  10 mL Intravenous 2 times per day    amLODIPine  10 mg Oral QAM    atorvastatin  10 mg Oral Daily    cilostazol  100 mg Oral BID    cloNIDine  0.2 mg Oral TID    DULoxetine  120 mg Oral QAM    pantoprazole  40 mg Oral QAM AC    lactulose  20 g Oral BID    linagliptin  5 mg Oral Daily    lisinopril  10 mg Oral Daily    metoprolol  100 mg Oral BID    oxyCODONE  10 mg Oral Q12H    pregabalin  200 mg Oral TID    spironolactone  50 mg Oral QAM    tiZANidine  4 mg Oral TID    insulin lispro  0-18 Units Subcutaneous TID WC    insulin lispro  0-9 Units Subcutaneous Nightly    piperacillin-tazobactam  3.375 g Intravenous Q8H    nicotine  1 patch Transdermal Daily     Continuous Infusions:   dextrose       PRN Meds:sodium chloride flush, acetaminophen, LORazepam, glucose, dextrose, glucagon (rDNA), dextrose, magnesium hydroxide, ondansetron, morphine, HYDROcodone 5 mg - acetaminophen    OBJECTIVE:  BP (!) 114/55   Pulse 71   Temp 98.8 °F (37.1 °C) (Temporal)   Resp 18   Ht 6' 2\" (1.88 m)   Wt (!) 340 lb (154.2 kg)   SpO2 93%   BMI 43.65 kg/m²   Temp  Av.5 °F (36.9 °C)  Min: 98 °F (36.7 °C)  Max: 98.8 °F (37.1 °C)    Constitutional: The

## 2018-08-05 LAB
ANAEROBIC CULTURE: NORMAL
BLOOD CULTURE, ROUTINE: NORMAL
BUN BLDV-MCNC: 18 MG/DL (ref 8–23)
CREAT SERPL-MCNC: 1.9 MG/DL (ref 0.7–1.2)
CULTURE, BLOOD 2: NORMAL
GFR AFRICAN AMERICAN: 44
GFR NON-AFRICAN AMERICAN: 44 ML/MIN/1.73
HCT VFR BLD CALC: 26 % (ref 37–54)
HEMOGLOBIN: 8.3 G/DL (ref 12.5–16.5)
INR BLD: 1.7
MCH RBC QN AUTO: 25.1 PG (ref 26–35)
MCHC RBC AUTO-ENTMCNC: 31.9 % (ref 32–34.5)
MCV RBC AUTO: 78.5 FL (ref 80–99.9)
METER GLUCOSE: 189 MG/DL (ref 70–110)
METER GLUCOSE: 274 MG/DL (ref 70–110)
METER GLUCOSE: 293 MG/DL (ref 70–110)
METER GLUCOSE: 307 MG/DL (ref 70–110)
PDW BLD-RTO: 15.3 FL (ref 11.5–15)
PLATELET # BLD: 308 E9/L (ref 130–450)
PMV BLD AUTO: 11.5 FL (ref 7–12)
PROTHROMBIN TIME: 18.6 SEC (ref 9.3–12.4)
RBC # BLD: 3.31 E12/L (ref 3.8–5.8)
WBC # BLD: 16.5 E9/L (ref 4.5–11.5)

## 2018-08-05 PROCEDURE — 97161 PT EVAL LOW COMPLEX 20 MIN: CPT

## 2018-08-05 PROCEDURE — 6370000000 HC RX 637 (ALT 250 FOR IP): Performed by: INTERNAL MEDICINE

## 2018-08-05 PROCEDURE — B548ZZA ULTRASONOGRAPHY OF SUPERIOR VENA CAVA, GUIDANCE: ICD-10-PCS | Performed by: HOSPITALIST

## 2018-08-05 PROCEDURE — G8988 SELF CARE GOAL STATUS: HCPCS

## 2018-08-05 PROCEDURE — 36415 COLL VENOUS BLD VENIPUNCTURE: CPT

## 2018-08-05 PROCEDURE — 6370000000 HC RX 637 (ALT 250 FOR IP): Performed by: STUDENT IN AN ORGANIZED HEALTH CARE EDUCATION/TRAINING PROGRAM

## 2018-08-05 PROCEDURE — 85610 PROTHROMBIN TIME: CPT

## 2018-08-05 PROCEDURE — 6370000000 HC RX 637 (ALT 250 FOR IP): Performed by: FAMILY MEDICINE

## 2018-08-05 PROCEDURE — 1200000000 HC SEMI PRIVATE

## 2018-08-05 PROCEDURE — 6360000002 HC RX W HCPCS: Performed by: FAMILY MEDICINE

## 2018-08-05 PROCEDURE — 2580000003 HC RX 258: Performed by: NURSE PRACTITIONER

## 2018-08-05 PROCEDURE — G8987 SELF CARE CURRENT STATUS: HCPCS

## 2018-08-05 PROCEDURE — 82565 ASSAY OF CREATININE: CPT

## 2018-08-05 PROCEDURE — 85027 COMPLETE CBC AUTOMATED: CPT

## 2018-08-05 PROCEDURE — 2580000003 HC RX 258: Performed by: FAMILY MEDICINE

## 2018-08-05 PROCEDURE — C1751 CATH, INF, PER/CENT/MIDLINE: HCPCS

## 2018-08-05 PROCEDURE — 97165 OT EVAL LOW COMPLEX 30 MIN: CPT

## 2018-08-05 PROCEDURE — 82962 GLUCOSE BLOOD TEST: CPT

## 2018-08-05 PROCEDURE — 02HV33Z INSERTION OF INFUSION DEVICE INTO SUPERIOR VENA CAVA, PERCUTANEOUS APPROACH: ICD-10-PCS | Performed by: HOSPITALIST

## 2018-08-05 PROCEDURE — 84520 ASSAY OF UREA NITROGEN: CPT

## 2018-08-05 RX ORDER — SODIUM CHLORIDE 0.9 % (FLUSH) 0.9 %
10 SYRINGE (ML) INJECTION EVERY 12 HOURS SCHEDULED
Status: DISCONTINUED | OUTPATIENT
Start: 2018-08-05 | End: 2018-08-07 | Stop reason: HOSPADM

## 2018-08-05 RX ORDER — SODIUM CHLORIDE 0.9 % (FLUSH) 0.9 %
10 SYRINGE (ML) INJECTION PRN
Status: DISCONTINUED | OUTPATIENT
Start: 2018-08-05 | End: 2018-08-07 | Stop reason: HOSPADM

## 2018-08-05 RX ORDER — LIDOCAINE HYDROCHLORIDE 10 MG/ML
5 INJECTION, SOLUTION EPIDURAL; INFILTRATION; INTRACAUDAL; PERINEURAL ONCE
Status: DISCONTINUED | OUTPATIENT
Start: 2018-08-05 | End: 2018-08-07 | Stop reason: HOSPADM

## 2018-08-05 RX ORDER — WARFARIN SODIUM 7.5 MG/1
7.5 TABLET ORAL DAILY
Status: DISCONTINUED | OUTPATIENT
Start: 2018-08-05 | End: 2018-08-07 | Stop reason: HOSPADM

## 2018-08-05 RX ADMIN — VANCOMYCIN HYDROCHLORIDE 2000 MG: 10 INJECTION, POWDER, LYOPHILIZED, FOR SOLUTION INTRAVENOUS at 23:07

## 2018-08-05 RX ADMIN — OXYCODONE HYDROCHLORIDE 20 MG: 20 TABLET, FILM COATED, EXTENDED RELEASE ORAL at 22:10

## 2018-08-05 RX ADMIN — PREGABALIN 200 MG: 100 CAPSULE ORAL at 16:01

## 2018-08-05 RX ADMIN — LINAGLIPTIN 5 MG: 5 TABLET, FILM COATED ORAL at 09:01

## 2018-08-05 RX ADMIN — INSULIN LISPRO 9 UNITS: 100 INJECTION, SOLUTION INTRAVENOUS; SUBCUTANEOUS at 12:29

## 2018-08-05 RX ADMIN — CILOSTAZOL 100 MG: 100 TABLET ORAL at 21:12

## 2018-08-05 RX ADMIN — Medication 10 ML: at 21:12

## 2018-08-05 RX ADMIN — INSULIN GLARGINE 30 UNITS: 100 INJECTION, SOLUTION SUBCUTANEOUS at 21:19

## 2018-08-05 RX ADMIN — PREGABALIN 200 MG: 100 CAPSULE ORAL at 09:00

## 2018-08-05 RX ADMIN — PIPERACILLIN SODIUM AND TAZOBACTAM SODIUM 3.38 G: 3; .375 INJECTION, POWDER, LYOPHILIZED, FOR SOLUTION INTRAVENOUS at 18:50

## 2018-08-05 RX ADMIN — WARFARIN SODIUM 7.5 MG: 7.5 TABLET ORAL at 18:50

## 2018-08-05 RX ADMIN — Medication 10 ML: at 18:51

## 2018-08-05 RX ADMIN — ATORVASTATIN CALCIUM 10 MG: 10 TABLET, FILM COATED ORAL at 09:01

## 2018-08-05 RX ADMIN — PANTOPRAZOLE SODIUM 40 MG: 40 TABLET, DELAYED RELEASE ORAL at 07:52

## 2018-08-05 RX ADMIN — INSULIN LISPRO 9 UNITS: 100 INJECTION, SOLUTION INTRAVENOUS; SUBCUTANEOUS at 17:56

## 2018-08-05 RX ADMIN — INSULIN LISPRO 6 UNITS: 100 INJECTION, SOLUTION INTRAVENOUS; SUBCUTANEOUS at 21:18

## 2018-08-05 RX ADMIN — TIZANIDINE 4 MG: 4 TABLET ORAL at 09:01

## 2018-08-05 RX ADMIN — INSULIN LISPRO 3 UNITS: 100 INJECTION, SOLUTION INTRAVENOUS; SUBCUTANEOUS at 09:02

## 2018-08-05 RX ADMIN — CLONIDINE HYDROCHLORIDE 0.2 MG: 0.1 TABLET ORAL at 21:13

## 2018-08-05 RX ADMIN — DULOXETINE HYDROCHLORIDE 120 MG: 60 CAPSULE, DELAYED RELEASE ORAL at 09:01

## 2018-08-05 RX ADMIN — METOPROLOL TARTRATE 100 MG: 50 TABLET, FILM COATED ORAL at 21:12

## 2018-08-05 RX ADMIN — PIPERACILLIN SODIUM AND TAZOBACTAM SODIUM 3.38 G: 3; .375 INJECTION, POWDER, LYOPHILIZED, FOR SOLUTION INTRAVENOUS at 01:24

## 2018-08-05 RX ADMIN — INSULIN GLARGINE 30 UNITS: 100 INJECTION, SOLUTION SUBCUTANEOUS at 09:03

## 2018-08-05 RX ADMIN — TIZANIDINE 4 MG: 4 TABLET ORAL at 21:12

## 2018-08-05 RX ADMIN — CILOSTAZOL 100 MG: 100 TABLET ORAL at 09:00

## 2018-08-05 RX ADMIN — PREGABALIN 200 MG: 100 CAPSULE ORAL at 21:12

## 2018-08-05 RX ADMIN — ASPIRIN 81 MG CHEWABLE TABLET 81 MG: 81 TABLET CHEWABLE at 09:01

## 2018-08-05 RX ADMIN — PIPERACILLIN SODIUM AND TAZOBACTAM SODIUM 3.38 G: 3; .375 INJECTION, POWDER, LYOPHILIZED, FOR SOLUTION INTRAVENOUS at 09:00

## 2018-08-05 RX ADMIN — TIZANIDINE 4 MG: 4 TABLET ORAL at 16:01

## 2018-08-05 ASSESSMENT — PAIN SCALES - GENERAL
PAINLEVEL_OUTOF10: 0
PAINLEVEL_OUTOF10: 7
PAINLEVEL_OUTOF10: 9

## 2018-08-05 ASSESSMENT — PAIN DESCRIPTION - ORIENTATION
ORIENTATION: RIGHT
ORIENTATION: RIGHT

## 2018-08-05 ASSESSMENT — PAIN DESCRIPTION - PAIN TYPE
TYPE: ACUTE PAIN
TYPE: ACUTE PAIN

## 2018-08-05 ASSESSMENT — PAIN DESCRIPTION - FREQUENCY
FREQUENCY: CONTINUOUS
FREQUENCY: CONTINUOUS

## 2018-08-05 ASSESSMENT — PAIN DESCRIPTION - PROGRESSION
CLINICAL_PROGRESSION: NOT CHANGED
CLINICAL_PROGRESSION: GRADUALLY IMPROVING

## 2018-08-05 ASSESSMENT — PAIN DESCRIPTION - DESCRIPTORS
DESCRIPTORS: DISCOMFORT;CONSTANT;NAGGING
DESCRIPTORS: DISCOMFORT;DULL

## 2018-08-05 ASSESSMENT — PAIN DESCRIPTION - ONSET
ONSET: ON-GOING
ONSET: ON-GOING

## 2018-08-05 ASSESSMENT — PAIN DESCRIPTION - LOCATION
LOCATION: FOOT
LOCATION: FOOT

## 2018-08-05 NOTE — PROGRESS NOTES
°C)    Constitutional: The patient is awake, alert, and oriented. Skin: Warm and dry. No rashes were noted. HEENT: Eyes show round, and reactive pupils. No jaundice. Moist mucous membranes, no ulcerations, no thrush. Neck: Supple to movements. No lymphadenopathy. Chest: No use of accessory muscles to breathe. Symmetrical expansion. Auscultation reveals no wheezing, crackles, or rhonchi. Cardiovascular: S1 and S2 are rhythmic and regular. No murmurs appreciated. Abdomen: Positive bowel sounds to auscultation. Benign to palpation. Extremities: No clubbing, no cyanosis, no edema. R foot dressing intact with boot in place.    Lines: peripheral    Laboratory and Tests Review:  Lab Results   Component Value Date    WBC 13.5 (H) 08/01/2018    WBC 15.5 (H) 07/31/2018    WBC 12.4 (H) 07/27/2018    HGB 7.8 (L) 08/01/2018    HCT 24.4 (L) 08/01/2018    MCV 77.7 (L) 08/01/2018     08/01/2018     Lab Results   Component Value Date    NEUTROABS 9.68 (H) 08/01/2018    NEUTROABS 11.92 (H) 07/31/2018    NEUTROABS 8.82 (H) 07/27/2018     Lab Results   Component Value Date    CRP 2.1 (H) 06/25/2018    CRP 7.4 (H) 06/11/2018    CRP 10.7 (H) 06/04/2018     No results found for: CRPHS  Lab Results   Component Value Date    SEDRATE 77 (H) 06/25/2018    SEDRATE 138 (H) 06/11/2018    SEDRATE 116 (H) 06/04/2018     Lab Results   Component Value Date    ALT 19 07/31/2018    AST 15 07/31/2018    ALKPHOS 106 07/31/2018    BILITOT 0.7 07/31/2018     Lab Results   Component Value Date     08/01/2018    K 4.5 08/01/2018    CL 95 08/01/2018    CO2 26 08/01/2018    BUN 18 08/05/2018    CREATININE 1.9 08/05/2018    CREATININE 2.0 08/04/2018    CREATININE 2.1 08/03/2018    GFRAA 44 08/05/2018    LABGLOM 44 08/05/2018    GLUCOSE 419 08/01/2018    PROT 8.1 07/31/2018    LABALBU 3.4 07/31/2018    CALCIUM 9.0 08/01/2018    BILITOT 0.7 07/31/2018    ALKPHOS 106 07/31/2018    AST 15 07/31/2018    ALT 19 07/31/2018 Radiology:  7/31/18 XR R foot:   CONCLUSION:       1. Stable postoperative changes involving the right foot. 2. Soft tissue gas seen along the plantar aspect of the forefoot   concerning for anaerobic infection. There are no plain film findings   to indicate acute osteomyelitis at this time. Suggest follow-up if   clinically indicated. Microbiology:   8/3 R foot intraop cx: alpha hemolytic strep, E coli, klebsiella, Staph species    ASSESSMENT:  · R foot cellulitis/OM   · Recently treated for R foot OM (enterococcus/strep and GNRs) with Unasyn x 6 weeks from 5/24 to 7/5  · S/p bedside I&D on 8/1 - no cultures sent   · S/p I&D multiple areas R foot with debridement soft tissue including subcutaneous tissue, tendon, and plantar fascia 8/3    PLAN:  · Continue vancomycin 2g every 24 hours   · Monitor trough - 8/3 15.9  · Continue zosyn 3.375g every 8 hours  · Follow finalized intraop cultures  · Monitor labs    Don Marissa  10:45 AM  8/5/2018     Pt seen and examined. Above discussed agree with advanced practice nurse. Labs, cultures, and radiographs reviewed. Face to Face encounter occurred. Changes made as necessary.      Louise Trujillo MD  Infectious Disease

## 2018-08-05 NOTE — PROGRESS NOTES
Hospitalist Progress Note      PCP: No primary care provider on file. Date of Admission: 7/31/2018    Chief Complaint: infected RLE    Hospital Course: *Pt has a known history of cellulitis of toes, thought now to have an acute ostemyelitis.  OR  Today, needs an amputation , but pt refuses. Had  Incision and drainage of multiple areas of the left  foot with excision of soft tissue including subcutaneous tissue, tendon,muscle and plantar fascia.  Awaiting pre cert for [facility]    Subjective: *no complaints      Medications:  Reviewed    Infusion Medications    dextrose       Scheduled Medications    warfarin  7.5 mg Oral Daily    lidocaine 1 % injection  5 mL Intradermal Once    sodium chloride flush  10 mL Intravenous 2 times per day    oxyCODONE  20 mg Oral Q12H    vancomycin  2,000 mg Intravenous Q24H    aspirin  81 mg Oral Daily    insulin glargine  30 Units Subcutaneous BID    sodium chloride flush  10 mL Intravenous 2 times per day    amLODIPine  10 mg Oral QAM    atorvastatin  10 mg Oral Daily    cilostazol  100 mg Oral BID    cloNIDine  0.2 mg Oral TID    DULoxetine  120 mg Oral QAM    pantoprazole  40 mg Oral QAM AC    lactulose  20 g Oral BID    linagliptin  5 mg Oral Daily    lisinopril  10 mg Oral Daily    metoprolol  100 mg Oral BID    pregabalin  200 mg Oral TID    spironolactone  50 mg Oral QAM    tiZANidine  4 mg Oral TID    insulin lispro  0-18 Units Subcutaneous TID WC    insulin lispro  0-9 Units Subcutaneous Nightly    piperacillin-tazobactam  3.375 g Intravenous Q8H    nicotine  1 patch Transdermal Daily     PRN Meds: sodium chloride flush, HYDROcodone 5 mg - acetaminophen, sodium chloride flush, acetaminophen, LORazepam, glucose, dextrose, glucagon (rDNA), dextrose, magnesium hydroxide, ondansetron, morphine      Intake/Output Summary (Last 24 hours) at 08/05/18 1448  Last data filed at 08/05/18 0616   Gross per 24 hour   Intake              660 ml   Output

## 2018-08-05 NOTE — PROGRESS NOTES
picc Placement 8/5/2018    Product number: [id number]   Lot Number: [id number]      Ultrasound: yes   L Basilic[de-identified]    Upper Arm Circumference: 45cm    Size: 5fr    Exposed Length: 0cm    Internal Length: 50cm   Cut: 0cm   Vein Measurement: 0.56cm    Theodora Dowd achieved  8/5/2018  4:58 PM

## 2018-08-05 NOTE — PROGRESS NOTES
8/3/2018  7:00 AM   Dressing/Treatment Ace Wrap 8/3/2018  7:00 AM   Wound Length (cm) 9 cm 7/31/2018 11:30 PM   Wound Width (cm) 5 cm 7/31/2018 11:30 PM   Wound Depth (cm)  0.25 7/31/2018 11:30 PM   Calculated Wound Size (cm^2) (l*w) 45 cm^2 7/31/2018 11:30 PM   Change in Wound Size % (l*w) 0 7/31/2018 11:30 PM   Wound Assessment Black;Dry; Intact 7/31/2018 11:30 PM   Drainage Amount None 7/31/2018 11:30 PM   Number of days: 4       Incision 08/03/18 Foot Right (Active)   Closure IAN 8/4/2018  4:20 PM   Drainage Amount None 8/3/2018  2:30 PM   Dressing/Treatment Ace Wrap 8/4/2018  4:20 PM   Dressing Status Clean;Dry; Intact 8/4/2018  4:20 PM   Number of days: 1       Laboratory:    CBC:   Lab Results   Component Value Date    WBC 13.5 08/01/2018    RBC 3.14 08/01/2018    HGB 7.8 08/01/2018    HCT 24.4 08/01/2018    MCV 77.7 08/01/2018    MCH 24.8 08/01/2018    MCHC 32.0 08/01/2018    RDW 15.3 08/01/2018     08/01/2018    MPV 12.7 08/01/2018     CBC with Differential:    Lab Results   Component Value Date    WBC 13.5 08/01/2018    RBC 3.14 08/01/2018    HGB 7.8 08/01/2018    HCT 24.4 08/01/2018     08/01/2018    MCV 77.7 08/01/2018    MCH 24.8 08/01/2018    MCHC 32.0 08/01/2018    RDW 15.3 08/01/2018    LYMPHOPCT 18.4 08/01/2018    MONOPCT 7.8 08/01/2018    BASOPCT 0.4 08/01/2018    MONOSABS 1.05 08/01/2018    LYMPHSABS 2.49 08/01/2018    EOSABS 0.17 08/01/2018    BASOSABS 0.06 08/01/2018     WBC:    Lab Results   Component Value Date    WBC 13.5 08/01/2018     Hemoglobin/Hematocrit:    Lab Results   Component Value Date    HGB 7.8 08/01/2018    HCT 24.4 08/01/2018     CMP:    Lab Results   Component Value Date     08/01/2018    K 4.5 08/01/2018    CL 95 08/01/2018    CO2 26 08/01/2018    BUN 18 08/04/2018    CREATININE 2.0 08/04/2018    GFRAA 41 08/04/2018    LABGLOM 41 08/04/2018    GLUCOSE 419 08/01/2018    PROT 8.1 07/31/2018    LABALBU 3.4 07/31/2018    CALCIUM 9.0 08/01/2018    BILITOT 0.7 2018    ALKPHOS 106 2018    AST 15 2018    ALT 19 2018     BMP:    Lab Results   Component Value Date     2018    K 4.5 2018    CL 95 2018    CO2 26 2018    BUN 18 2018    LABALBU 3.4 2018    CREATININE 2.0 2018    CALCIUM 9.0 2018    GFRAA 41 2018    LABGLOM 41 2018    GLUCOSE 419 2018     BUN/Creatinine:    Lab Results   Component Value Date    BUN 18 2018    CREATININE 2.0 2018     Uric Acid:  No results found for: URICACID  PT/INR:    Lab Results   Component Value Date    PROTIME 20.8 2018    INR 1.9 2018     Warfarin PT/INR:    Lab Results   Component Value Date    PROTIME 20.8 2018    INR 1.9 2018     PTT:    Lab Results   Component Value Date    APTT 61.1 2018   [APTT}  U/A:    Lab Results   Component Value Date    NITRU Negative 2018    COLORU Yellow 2018    PHUR 6.0 2018    WBCUA 1-3 2018    RBCUA 2-5 2018    BACTERIA RARE 2018    CLARITYU Clear 2018    SPECGRAV <=1.005 2018    LEUKOCYTESUR Negative 2018    UROBILINOGEN 0.2 2018    BILIRUBINUR Negative 2018    BLOODU Negative 2018    GLUCOSEU Negative 2018    KETUA Negative 2018    AMORPHOUS FEW 2018     HgBA1c:    Lab Results   Component Value Date    LABA1C 13.3 2018     Urine Toxicology:  No results found for: Francisco Peeks, COCAINESCRN, LABOPIA, LABPHEN  24 Hour Urine for Protein:  No results found for: UTV, HOURSC, URINEVOLUME  24 Hour Urine for Creatinine Clearance:  No results found for: [id number], HOURSC, UTV   Xr Pelvis (1-2 Views)    Result Date: 2018  Patient MRN: [id number] : 1955 Age:  63 years Gender: Male Order Date: 2018 4:30 PM Exam: XR PELVIS (1-2 VIEWS) Number of Views: 1 Indication:  Wound Infection, pain status post fall Comparison: None. Findings:  Moderate joint space loss and acetabular stress study has bilaterally. Postoperative changes proximal right lower extremity. No acute fracture. No lytic or blastic bony lesion. 1. Moderate bilateral osteoarthritis of the hips. 2. No acute fracture identified. If there is persistent clinical pain or symptomatology, a return to medical attention within 2-7 days and further imaging is recommended. . 3. Please note this study does not exclude underlying infection or cellulitis. Xr Hip Right (2-3 Views)    Result Date: 2018  Patient MRN: [id number] : 1955 Age:  63 years Gender: Male Order Date: 2018 4:30 PM Exam: XR HIP RIGHT (2-3 VIEWS) Number of Views: 2 Indication:  Wound infection. Cellulitis. Fall. Tailbone, right hip, knee and leg pain. Comparison: Right hip radiographs 2018. Findings: Moderate joint space loss and acetabular sclerosis of the right hip. Surgical changes overlie overlying the right hemipelvis. No acute fracture. No lytic or blastic bony lesion. 1. Moderate right hip osteoarthritis. 2. No acute fracture identified. If there is persistent clinical pain or symptomatology, a return to medical attention within 2-7 days and further imaging is recommended. Baldemar Guzmank Hip Right (2-3 Views)    Result Date: 2018  Patient MRN:  [id number] : 1955 Age: 63 years Gender: Male Order Date:  2018 3:15 PM EXAM: XR HIP RIGHT (2-3 VIEWS) INDICATION:  fall, injury fall, injury COMPARISON: None NUMBER OF IMAGES:  2 FINDINGS:  AP and frog-leg views of the right hip were obtained. There is no evidence of acute fracture or dislocation. The hip joint space is within normal limits. There are multiple surgical clips in the right inguinal area as well as a metallic suture. No abnormal soft tissue calcifications are seen. No osteoblastic or osteolytic lesions are seen. Surgical clips are seen in the medial upper thigh. CONCLUSION:  1. No acute osseous abnormality identified. 2. Evidence of prior vascular surgery. vein thrombosis)    Anticoagulated  Resolved Problems:    * No resolved hospital problems. *  Ulcer right foot present on admission      Plan:  Exam  Reviewed chart and labs. Excisional debridement lateral foot ulcer with 11 blade to subcutaneous tissue. Applied dsd. Aquacel ag. Stable for d/c to ECF when okay with ID and medicine.         Electronically signed by Analia Martínez DPM on 8/4/2018 at 8:10 PM

## 2018-08-05 NOTE — PROGRESS NOTES
Sensation:wfl  Tone: wfl  Edema:none noted       Functional Assessment:   Initial Eval 8/5 Comments   Feeding  indep    Grooming  Set-up Seated at EOB   Upper Body Dressing SBA     Lower Body Dressing Mod A    Bathing MOd A    Toileting  NT    Bed Mobility  Supine to Sit: SBA  Sit to Supine:NT    Functional Transfers Min A x2  Cuing on hand placement, body mechanics and safety. 2nd therapist for assist with maintaining NWB R LE (pt with occasional weightbearing requiring max cuing)   Functional Mobility NT      Sit balance: mod I  Stand balance: min A  Endurance/Activity tolerance: F-                              Comments: Upon arrival pt supine in bed and agreeable to OT Session. Therapist facilitated bed mobility and functional transfers (maintiniang NWB with boot donned). Therapist facilitated UB/LB dressing task and grooming at EOB.  At end of session pt seated in chair with all devices within reach, all lines intact     Assessment of current deficits   Functional mobility [x]  ROM [] Strength [x]  Cognition []  ADLs [x]   IADLs [x] Safety Awareness [x] Endurance [x]  Fine Motor Coordination [] Balance [x] Vision/perception [] Sensation []   Gross Motor Coordination []     Eval Complexity: low  Profile and History- low  Assessment of Occupational Performance and Identification of Deficits- med  Clinical Decision Making- med    Treatment frequency: prn     Plan of Care:  ADL retraining [x]   Equipment needs [x]   Neuromuscular re-education [] Energy Conservation Techniques [x]  Functional Transfer training [x] Patient and/or Family Education [x]  Functional Mobility training [x]  Environmental Modifications []  Cognitive re-training []   Compensatory techniques for ADLs [x]  Splinting Needs []   Positioning to improve overall function []  Other: []      Rehab Potential: good    Patient / Family Goal: none stated    Short term goals  Time Frame: 3-5 days  STG 1 :  Pt will complete LB self-care tasks with min

## 2018-08-06 ENCOUNTER — APPOINTMENT (OUTPATIENT)
Dept: GENERAL RADIOLOGY | Age: 63
DRG: 264 | End: 2018-08-06
Payer: MEDICARE

## 2018-08-06 LAB
BUN BLDV-MCNC: 18 MG/DL (ref 8–23)
CREAT SERPL-MCNC: 1.8 MG/DL (ref 0.7–1.2)
CULTURE SURGICAL: ABNORMAL
GFR AFRICAN AMERICAN: 47
GFR NON-AFRICAN AMERICAN: 47 ML/MIN/1.73
GRAM STAIN RESULT: ABNORMAL
HCT VFR BLD CALC: 23.4 % (ref 37–54)
HEMOGLOBIN: 7.7 G/DL (ref 12.5–16.5)
INR BLD: 1.5
MCH RBC QN AUTO: 25.5 PG (ref 26–35)
MCHC RBC AUTO-ENTMCNC: 32.9 % (ref 32–34.5)
MCV RBC AUTO: 77.5 FL (ref 80–99.9)
METER GLUCOSE: 275 MG/DL (ref 70–110)
METER GLUCOSE: 281 MG/DL (ref 70–110)
METER GLUCOSE: 284 MG/DL (ref 70–110)
METER GLUCOSE: 295 MG/DL (ref 70–110)
ORGANISM: ABNORMAL
PDW BLD-RTO: 15 FL (ref 11.5–15)
PLATELET # BLD: 267 E9/L (ref 130–450)
PMV BLD AUTO: 11.4 FL (ref 7–12)
PROTHROMBIN TIME: 17.6 SEC (ref 9.3–12.4)
RBC # BLD: 3.02 E12/L (ref 3.8–5.8)
WBC # BLD: 13.8 E9/L (ref 4.5–11.5)

## 2018-08-06 PROCEDURE — 36592 COLLECT BLOOD FROM PICC: CPT

## 2018-08-06 PROCEDURE — 2580000003 HC RX 258: Performed by: NURSE PRACTITIONER

## 2018-08-06 PROCEDURE — 6370000000 HC RX 637 (ALT 250 FOR IP): Performed by: FAMILY MEDICINE

## 2018-08-06 PROCEDURE — 36415 COLL VENOUS BLD VENIPUNCTURE: CPT

## 2018-08-06 PROCEDURE — 85027 COMPLETE CBC AUTOMATED: CPT

## 2018-08-06 PROCEDURE — 1200000000 HC SEMI PRIVATE

## 2018-08-06 PROCEDURE — 2580000003 HC RX 258: Performed by: INTERNAL MEDICINE

## 2018-08-06 PROCEDURE — 2580000003 HC RX 258: Performed by: FAMILY MEDICINE

## 2018-08-06 PROCEDURE — 6370000000 HC RX 637 (ALT 250 FOR IP): Performed by: INTERNAL MEDICINE

## 2018-08-06 PROCEDURE — 85610 PROTHROMBIN TIME: CPT

## 2018-08-06 PROCEDURE — 82565 ASSAY OF CREATININE: CPT

## 2018-08-06 PROCEDURE — 6360000002 HC RX W HCPCS: Performed by: FAMILY MEDICINE

## 2018-08-06 PROCEDURE — 84520 ASSAY OF UREA NITROGEN: CPT

## 2018-08-06 PROCEDURE — 82962 GLUCOSE BLOOD TEST: CPT

## 2018-08-06 PROCEDURE — 71045 X-RAY EXAM CHEST 1 VIEW: CPT

## 2018-08-06 PROCEDURE — 6370000000 HC RX 637 (ALT 250 FOR IP): Performed by: STUDENT IN AN ORGANIZED HEALTH CARE EDUCATION/TRAINING PROGRAM

## 2018-08-06 RX ADMIN — CLONIDINE HYDROCHLORIDE 0.2 MG: 0.1 TABLET ORAL at 21:31

## 2018-08-06 RX ADMIN — Medication 10 ML: at 20:07

## 2018-08-06 RX ADMIN — PIPERACILLIN SODIUM AND TAZOBACTAM SODIUM 3.38 G: 3; .375 INJECTION, POWDER, LYOPHILIZED, FOR SOLUTION INTRAVENOUS at 01:35

## 2018-08-06 RX ADMIN — LINAGLIPTIN 5 MG: 5 TABLET, FILM COATED ORAL at 09:05

## 2018-08-06 RX ADMIN — CILOSTAZOL 100 MG: 100 TABLET ORAL at 21:31

## 2018-08-06 RX ADMIN — CLONIDINE HYDROCHLORIDE 0.2 MG: 0.1 TABLET ORAL at 14:02

## 2018-08-06 RX ADMIN — AMLODIPINE BESYLATE 10 MG: 10 TABLET ORAL at 09:02

## 2018-08-06 RX ADMIN — Medication 10 ML: at 09:06

## 2018-08-06 RX ADMIN — INSULIN LISPRO 9 UNITS: 100 INJECTION, SOLUTION INTRAVENOUS; SUBCUTANEOUS at 17:35

## 2018-08-06 RX ADMIN — MORPHINE SULFATE 2 MG: 2 INJECTION, SOLUTION INTRAMUSCULAR; INTRAVENOUS at 20:07

## 2018-08-06 RX ADMIN — Medication 10 ML: at 21:30

## 2018-08-06 RX ADMIN — LACTULOSE 20 G: 20 SOLUTION ORAL at 21:31

## 2018-08-06 RX ADMIN — INSULIN GLARGINE 30 UNITS: 100 INJECTION, SOLUTION SUBCUTANEOUS at 08:26

## 2018-08-06 RX ADMIN — PANTOPRAZOLE SODIUM 40 MG: 40 TABLET, DELAYED RELEASE ORAL at 06:25

## 2018-08-06 RX ADMIN — OXYCODONE HYDROCHLORIDE 20 MG: 20 TABLET, FILM COATED, EXTENDED RELEASE ORAL at 10:00

## 2018-08-06 RX ADMIN — INSULIN LISPRO 5 UNITS: 100 INJECTION, SOLUTION INTRAVENOUS; SUBCUTANEOUS at 21:45

## 2018-08-06 RX ADMIN — LISINOPRIL 10 MG: 10 TABLET ORAL at 09:02

## 2018-08-06 RX ADMIN — METOPROLOL TARTRATE 100 MG: 50 TABLET, FILM COATED ORAL at 09:03

## 2018-08-06 RX ADMIN — ASPIRIN 81 MG CHEWABLE TABLET 81 MG: 81 TABLET CHEWABLE at 09:03

## 2018-08-06 RX ADMIN — OXYCODONE HYDROCHLORIDE 20 MG: 20 TABLET, FILM COATED, EXTENDED RELEASE ORAL at 21:30

## 2018-08-06 RX ADMIN — INSULIN GLARGINE 30 UNITS: 100 INJECTION, SOLUTION SUBCUTANEOUS at 21:46

## 2018-08-06 RX ADMIN — DULOXETINE HYDROCHLORIDE 120 MG: 60 CAPSULE, DELAYED RELEASE ORAL at 09:01

## 2018-08-06 RX ADMIN — PREGABALIN 200 MG: 100 CAPSULE ORAL at 09:00

## 2018-08-06 RX ADMIN — PREGABALIN 200 MG: 100 CAPSULE ORAL at 21:30

## 2018-08-06 RX ADMIN — WARFARIN SODIUM 7.5 MG: 7.5 TABLET ORAL at 17:52

## 2018-08-06 RX ADMIN — ATORVASTATIN CALCIUM 10 MG: 10 TABLET, FILM COATED ORAL at 09:03

## 2018-08-06 RX ADMIN — Medication 10 ML: at 09:00

## 2018-08-06 RX ADMIN — PIPERACILLIN SODIUM AND TAZOBACTAM SODIUM 3.38 G: 3; .375 INJECTION, POWDER, LYOPHILIZED, FOR SOLUTION INTRAVENOUS at 09:00

## 2018-08-06 RX ADMIN — CILOSTAZOL 100 MG: 100 TABLET ORAL at 09:01

## 2018-08-06 RX ADMIN — TIZANIDINE 4 MG: 4 TABLET ORAL at 09:01

## 2018-08-06 RX ADMIN — INSULIN LISPRO 9 UNITS: 100 INJECTION, SOLUTION INTRAVENOUS; SUBCUTANEOUS at 11:31

## 2018-08-06 RX ADMIN — HYDROCODONE BITARTRATE AND ACETAMINOPHEN 1 TABLET: 5; 325 TABLET ORAL at 10:04

## 2018-08-06 RX ADMIN — HYDROCODONE BITARTRATE AND ACETAMINOPHEN 1 TABLET: 5; 325 TABLET ORAL at 17:30

## 2018-08-06 RX ADMIN — SPIRONOLACTONE 50 MG: 25 TABLET ORAL at 09:02

## 2018-08-06 RX ADMIN — VANCOMYCIN HYDROCHLORIDE 2000 MG: 10 INJECTION, POWDER, LYOPHILIZED, FOR SOLUTION INTRAVENOUS at 21:31

## 2018-08-06 RX ADMIN — INSULIN LISPRO 9 UNITS: 100 INJECTION, SOLUTION INTRAVENOUS; SUBCUTANEOUS at 08:30

## 2018-08-06 RX ADMIN — CLONIDINE HYDROCHLORIDE 0.2 MG: 0.1 TABLET ORAL at 09:00

## 2018-08-06 RX ADMIN — PIPERACILLIN SODIUM AND TAZOBACTAM SODIUM 3.38 G: 3; .375 INJECTION, POWDER, LYOPHILIZED, FOR SOLUTION INTRAVENOUS at 17:00

## 2018-08-06 RX ADMIN — TIZANIDINE 4 MG: 4 TABLET ORAL at 21:30

## 2018-08-06 RX ADMIN — PREGABALIN 200 MG: 100 CAPSULE ORAL at 14:06

## 2018-08-06 RX ADMIN — TIZANIDINE 4 MG: 4 TABLET ORAL at 14:03

## 2018-08-06 RX ADMIN — METOPROLOL TARTRATE 100 MG: 50 TABLET, FILM COATED ORAL at 21:30

## 2018-08-06 RX ADMIN — LACTULOSE 20 G: 20 SOLUTION ORAL at 09:04

## 2018-08-06 ASSESSMENT — PAIN DESCRIPTION - DESCRIPTORS
DESCRIPTORS: ACHING;NAGGING
DESCRIPTORS: ACHING;SORE;THROBBING
DESCRIPTORS: ACHING;SORE;THROBBING

## 2018-08-06 ASSESSMENT — PAIN DESCRIPTION - ORIENTATION
ORIENTATION: RIGHT

## 2018-08-06 ASSESSMENT — PAIN SCALES - GENERAL
PAINLEVEL_OUTOF10: 7
PAINLEVEL_OUTOF10: 5
PAINLEVEL_OUTOF10: 4
PAINLEVEL_OUTOF10: 0
PAINLEVEL_OUTOF10: 6
PAINLEVEL_OUTOF10: 7
PAINLEVEL_OUTOF10: 8

## 2018-08-06 ASSESSMENT — PAIN DESCRIPTION - PAIN TYPE
TYPE: ACUTE PAIN;SURGICAL PAIN
TYPE: ACUTE PAIN
TYPE: ACUTE PAIN;SURGICAL PAIN

## 2018-08-06 ASSESSMENT — PAIN DESCRIPTION - LOCATION
LOCATION: FOOT

## 2018-08-06 ASSESSMENT — PAIN DESCRIPTION - ONSET
ONSET: ON-GOING
ONSET: ON-GOING

## 2018-08-06 ASSESSMENT — PAIN DESCRIPTION - FREQUENCY
FREQUENCY: CONTINUOUS

## 2018-08-06 ASSESSMENT — PAIN DESCRIPTION - PROGRESSION: CLINICAL_PROGRESSION: GRADUALLY IMPROVING

## 2018-08-06 NOTE — PROGRESS NOTES
Pharmacy Consultation Note  (Antibiotic Dosing and Monitoring)     Initial consult date: 18  Consulting physician: Dr. Clement Rosa  Drug(s): Vancomycin  Indication: Diabetic foot infection         Ht Readings from Last 1 Encounters:   18 6' 2\" (1.88 m)         Age/Gender IBW DW  Allergy Information   63 y.o.  male 82.2 kg 111.6 kg  Patient has no known allergies. Date  WBC BUN/sCr UOP (mL/kg/hr) Drug/Dose Time   Given Level(s)   (Time) Comments     (#1) 15.5 22/1. 8   Vancomycin 3g IV x1 2204         (#2) 13.5 20/1. 8   Vancomycin 2g IV Q18h 1800         (#3) --    *Vancomycin 2g IV Q24h 2210       8/3  (#4) -- 21/2.1   Vancomycin 2g IV Q24h 2225 15.9 mcg/ml @2126        (#5)  -- --     Vancomycin 2g IV Q24h 1892           (#6)  16.5 18/1. 9    Vancomycin 2g IV Q24h 2307        -- 18/1.8  Vancomycin 2g IV q24h (0)        Other Antibiotics/Antifungals/Antivirals Start Date Stop Date Comments   Zosyn 3.375g IV Q8h        Clindamycin 600mg IV Q8h                               Estimated CrCL: 50-60 mL/min        Intake/Output Summary (Last 24 hours) at 18 1546  Last data filed at 18 1415    Gross per 24 hour   Intake              250 ml   Output              550 ml   Net             -300 ml         Temp max: Temp (24hrs), Av.1 °F (37.3 °C), Min:98 °F (36.7 °C), Max:100.5 °F (38.1 °C)        Cultures:    Culture Date Result    Blood Cxs x2  NGTD   Anaerobic Cx (R Foot Abscess) 8/3 Pending    Surgical culture  8/3 Klebsiella pneumoniae - one colony (non ESBL)  Staph species  EColi (non ESBL)                 Assessment:  · [facility] SHAY is a 63year old male w/hx diabetes, severe PVD - recently discharged in May w/infected right foot ulcer  ? Discharged to LTAC x6 wks with IV Unasyn, now returning to ED after visit w/podiatrist - wound worsening and had maggots in it  ?  Amputation recommended per vascular - patient continues to decline  · Consulted by

## 2018-08-06 NOTE — PROGRESS NOTES
Nutrition Assessment    Type and Reason for Visit: Reassess    Nutrition Recommendations: None    Malnutrition Assessment:  · Malnutrition Status: No malnutrition  · Context: Acute illness or injury  · Findings of the 6 clinical characteristics of malnutrition (Minimum of 2 out of 6 clinical characteristics is required to make the diagnosis of moderate or severe Protein Calorie Malnutrition based on AND/ASPEN Guidelines):  1. Energy Intake-Greater than 75%, greater than or equal to 1 month    2. Weight Loss-No significant weight loss,  (2 months)  3. Fat Loss-No significant subcutaneous fat loss,    4. Muscle Loss-No significant muscle mass loss,    5. Fluid Accumulation-No significant fluid accumulation, Extremities  6.  Strength-Normal    Nutrition Diagnosis:   · Problem: Increased nutrient needs  · Etiology: related to Acute injury/trauma     Signs and symptoms:  as evidenced by Presence of wounds    Nutrition Assessment:  · Nutrition-Focused Physical Findings: pt alert, missing teeth, abd rotund soft, RLE edema (IAN due to dressings), -2.8L I/O  · Wound Type: Multiple, Open Wounds, Surgical Wound  · Current Nutrition Therapies:  · Oral Diet Orders: Carb Control 4 Carbs/Meal   · Oral Diet intake: % (most meals)  · Oral Nutrition Supplement (ONS) Orders: None (pt refused ONS)  · Anthropometric Measures:  · Ht: 6' 2\" (188 cm)   · Current Body Wt: 336 lb 12.8 oz (152.8 kg) (8/6 bed scale)  · Admission Body Wt: 329 lb 11.2 oz (149.6 kg)  · Usual Body Wt: 361 lb (163.7 kg) (5/25/18, bed scale per EMR)  · % Weight Change: 2% wt gain in 5 days     · Ideal Body Wt: 190 lb (86.2 kg), % Ideal Body 177%   · BMI Classification: BMI > or equal to 40.0 Obese Class III  · Comparative Standards (Estimated Nutrition Needs):  · Estimated Daily Total Kcal: 2036-2299  · Estimated Daily Protein (g): 120-130    Estimated Intake vs Estimated Needs: Intake Exceeds Needs    Nutrition Risk Level:  Moderate    Nutrition
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
## 2018-08-06 NOTE — PROGRESS NOTES
800 ml   Net               70 ml       Exam:    BP (!) 148/90   Pulse 74   Temp 98 °F (36.7 °C) (Temporal)   Resp 18   Ht 6' 2\" (1.88 m)   Wt (!) 336 lb (152.4 kg)   SpO2 97%   BMI 43.14 kg/m²       Gen: well developed  HEENT: NC/AT, moist mucous membranes,   Neck: supple, trachea midline, no anterior cervical or SC LAD  Heart:  Normal s1/s2, RRR, no murmurs, gallops, or rubs. Lungs: cta  bilaterally,   Abd: bowel sounds present, soft, nontender,  Extrem:  No clubbing, cyanosis,  Has on boot RLE  Skin: no rashes or lesions  Psych: A & O x3  Neuro: grossly intact, moves all four extremities.    Capillary Refill: Brisk,< 3 seconds   Peripheral Pulses: +2 palpable, equal bilaterally                 Labs:   Recent Labs      08/05/18   1057   WBC  16.5*   HGB  8.3*   HCT  26.0*   PLT  308     Recent Labs      08/04/18   1505  08/05/18   0439  08/06/18   0620   BUN  18 18  18   CREATININE  2.0*  1.9*  1.8*     No results for input(s): AST, ALT, BILIDIR, BILITOT, ALKPHOS in the last 72 hours. Recent Labs      08/04/18   0450  08/05/18   0439  08/06/18   0620   INR  1.9  1.7  1.5     No results for input(s): CKTOTAL, TROPONINI in the last 72 hours. No results for input(s): AST, ALT, ALB, BILIDIR, BILITOT, ALKPHOS in the last 72 hours. No results for input(s): LACTA in the last 72 hours.   Lab Results   Component Value Date    LABURIC 6.6 06/05/2018     No results found for: AMMONIA    Assessment:  Cellulitis and abscess of toe of right foot [L03.031, L02.611] 07/31/2018     PVD [facility]peripheral vascular disease) ([facility] [I73.9] 07/31/2018    Absent foot pulse [R09.89] 07/31/2018    Ulcer of right lower extremity ([facility] [L97.919] 07/31/2018    Leukocytosis [D72.829] 07/31/2018    Microcytic anemia [D50.9] 07/31/2018    Stage 3 chronic kidney disease [N18.3] 07/31/2018    Morbid obesity with BMI of 40.0-44.9, adult ([facility] [E66.01, Z68.41] 07/31/2018    Essential hypertension [I10] 07/31/2018    Tobacco

## 2018-08-07 VITALS
TEMPERATURE: 98.7 F | RESPIRATION RATE: 18 BRPM | DIASTOLIC BLOOD PRESSURE: 59 MMHG | SYSTOLIC BLOOD PRESSURE: 119 MMHG | BODY MASS INDEX: 40.43 KG/M2 | WEIGHT: 315 LBS | HEART RATE: 68 BPM | OXYGEN SATURATION: 92 % | HEIGHT: 74 IN

## 2018-08-07 LAB
ANION GAP SERPL CALCULATED.3IONS-SCNC: 11 MMOL/L (ref 7–16)
BUN BLDV-MCNC: 19 MG/DL (ref 8–23)
CALCIUM SERPL-MCNC: 8.5 MG/DL (ref 8.6–10.2)
CHLORIDE BLD-SCNC: 97 MMOL/L (ref 98–107)
CO2: 25 MMOL/L (ref 22–29)
CREAT SERPL-MCNC: 1.8 MG/DL (ref 0.7–1.2)
GFR AFRICAN AMERICAN: 47
GFR NON-AFRICAN AMERICAN: 47 ML/MIN/1.73
GLUCOSE BLD-MCNC: 229 MG/DL (ref 74–109)
INR BLD: 1.6
MAGNESIUM: 1.7 MG/DL (ref 1.6–2.6)
METER GLUCOSE: 217 MG/DL (ref 70–110)
METER GLUCOSE: 258 MG/DL (ref 70–110)
PHOSPHORUS: 3.1 MG/DL (ref 2.5–4.5)
POTASSIUM SERPL-SCNC: 4.7 MMOL/L (ref 3.5–5)
PROTHROMBIN TIME: 18 SEC (ref 9.3–12.4)
SODIUM BLD-SCNC: 133 MMOL/L (ref 132–146)

## 2018-08-07 PROCEDURE — 6370000000 HC RX 637 (ALT 250 FOR IP): Performed by: FAMILY MEDICINE

## 2018-08-07 PROCEDURE — 2580000003 HC RX 258: Performed by: HOSPITALIST

## 2018-08-07 PROCEDURE — 6360000002 HC RX W HCPCS: Performed by: INTERNAL MEDICINE

## 2018-08-07 PROCEDURE — 80048 BASIC METABOLIC PNL TOTAL CA: CPT

## 2018-08-07 PROCEDURE — 83735 ASSAY OF MAGNESIUM: CPT

## 2018-08-07 PROCEDURE — 2580000003 HC RX 258: Performed by: FAMILY MEDICINE

## 2018-08-07 PROCEDURE — 85610 PROTHROMBIN TIME: CPT

## 2018-08-07 PROCEDURE — 36415 COLL VENOUS BLD VENIPUNCTURE: CPT

## 2018-08-07 PROCEDURE — 6370000000 HC RX 637 (ALT 250 FOR IP): Performed by: INTERNAL MEDICINE

## 2018-08-07 PROCEDURE — 82962 GLUCOSE BLOOD TEST: CPT

## 2018-08-07 PROCEDURE — 6370000000 HC RX 637 (ALT 250 FOR IP): Performed by: STUDENT IN AN ORGANIZED HEALTH CARE EDUCATION/TRAINING PROGRAM

## 2018-08-07 PROCEDURE — 84100 ASSAY OF PHOSPHORUS: CPT

## 2018-08-07 PROCEDURE — 6360000002 HC RX W HCPCS: Performed by: FAMILY MEDICINE

## 2018-08-07 PROCEDURE — 2580000003 HC RX 258: Performed by: INTERNAL MEDICINE

## 2018-08-07 PROCEDURE — 2580000003 HC RX 258: Performed by: NURSE PRACTITIONER

## 2018-08-07 RX ORDER — AMLODIPINE BESYLATE 10 MG/1
10 TABLET ORAL EVERY MORNING
Qty: 30 TABLET | Refills: 3 | Status: SHIPPED | OUTPATIENT
Start: 2018-08-07 | End: 2018-12-12 | Stop reason: SDUPTHER

## 2018-08-07 RX ORDER — CLONIDINE HYDROCHLORIDE 0.2 MG/1
0.2 TABLET ORAL 3 TIMES DAILY
Qty: 60 TABLET | Refills: 3 | Status: ON HOLD | OUTPATIENT
Start: 2018-08-07 | End: 2018-11-14 | Stop reason: HOSPADM

## 2018-08-07 RX ORDER — 0.9 % SODIUM CHLORIDE 0.9 %
500 INTRAVENOUS SOLUTION INTRAVENOUS ONCE
Status: COMPLETED | OUTPATIENT
Start: 2018-08-07 | End: 2018-08-07

## 2018-08-07 RX ORDER — ASPIRIN 81 MG/1
81 TABLET, CHEWABLE ORAL DAILY
Qty: 30 TABLET | Refills: 3 | Status: SHIPPED | OUTPATIENT
Start: 2018-08-08 | End: 2020-10-13

## 2018-08-07 RX ORDER — LISINOPRIL 10 MG/1
10 TABLET ORAL DAILY
Qty: 30 TABLET | Refills: 3 | Status: ON HOLD | OUTPATIENT
Start: 2018-08-07 | End: 2020-06-23 | Stop reason: HOSPADM

## 2018-08-07 RX ORDER — CEFTRIAXONE 500 MG/1
2 INJECTION, POWDER, FOR SOLUTION INTRAMUSCULAR; INTRAVENOUS EVERY 24 HOURS
Qty: 76 G | Refills: 0 | Status: SHIPPED | OUTPATIENT
Start: 2018-08-07 | End: 2018-09-14

## 2018-08-07 RX ORDER — OXYCODONE HCL 20 MG/1
20 TABLET, FILM COATED, EXTENDED RELEASE ORAL EVERY 12 HOURS
Qty: 6 TABLET | Refills: 0 | Status: SHIPPED | OUTPATIENT
Start: 2018-08-07 | End: 2018-08-10

## 2018-08-07 RX ADMIN — INSULIN LISPRO 6 UNITS: 100 INJECTION, SOLUTION INTRAVENOUS; SUBCUTANEOUS at 12:23

## 2018-08-07 RX ADMIN — AMLODIPINE BESYLATE 10 MG: 10 TABLET ORAL at 14:21

## 2018-08-07 RX ADMIN — ATORVASTATIN CALCIUM 10 MG: 10 TABLET, FILM COATED ORAL at 14:20

## 2018-08-07 RX ADMIN — INSULIN LISPRO 6 UNITS: 100 INJECTION, SOLUTION INTRAVENOUS; SUBCUTANEOUS at 09:47

## 2018-08-07 RX ADMIN — TIZANIDINE 4 MG: 4 TABLET ORAL at 14:19

## 2018-08-07 RX ADMIN — LINAGLIPTIN 5 MG: 5 TABLET, FILM COATED ORAL at 14:19

## 2018-08-07 RX ADMIN — CEFTRIAXONE SODIUM 2 G: 2 INJECTION, POWDER, FOR SOLUTION INTRAMUSCULAR; INTRAVENOUS at 14:24

## 2018-08-07 RX ADMIN — INSULIN LISPRO 9 UNITS: 100 INJECTION, SOLUTION INTRAVENOUS; SUBCUTANEOUS at 17:19

## 2018-08-07 RX ADMIN — CILOSTAZOL 100 MG: 100 TABLET ORAL at 14:20

## 2018-08-07 RX ADMIN — SODIUM CHLORIDE 500 ML: 9 INJECTION, SOLUTION INTRAVENOUS at 17:51

## 2018-08-07 RX ADMIN — SPIRONOLACTONE 50 MG: 25 TABLET ORAL at 14:19

## 2018-08-07 RX ADMIN — DULOXETINE HYDROCHLORIDE 120 MG: 60 CAPSULE, DELAYED RELEASE ORAL at 14:19

## 2018-08-07 RX ADMIN — PANTOPRAZOLE SODIUM 40 MG: 40 TABLET, DELAYED RELEASE ORAL at 09:42

## 2018-08-07 RX ADMIN — ASPIRIN 81 MG CHEWABLE TABLET 81 MG: 81 TABLET CHEWABLE at 09:41

## 2018-08-07 RX ADMIN — CLONIDINE HYDROCHLORIDE 0.2 MG: 0.1 TABLET ORAL at 09:42

## 2018-08-07 RX ADMIN — Medication 10 ML: at 14:31

## 2018-08-07 RX ADMIN — METOPROLOL TARTRATE 100 MG: 50 TABLET, FILM COATED ORAL at 14:19

## 2018-08-07 RX ADMIN — Medication 10 ML: at 14:30

## 2018-08-07 RX ADMIN — PREGABALIN 200 MG: 100 CAPSULE ORAL at 14:27

## 2018-08-07 RX ADMIN — INSULIN GLARGINE 30 UNITS: 100 INJECTION, SOLUTION SUBCUTANEOUS at 09:47

## 2018-08-07 RX ADMIN — CLONIDINE HYDROCHLORIDE 0.2 MG: 0.1 TABLET ORAL at 14:27

## 2018-08-07 RX ADMIN — PIPERACILLIN SODIUM AND TAZOBACTAM SODIUM 3.38 G: 3; .375 INJECTION, POWDER, LYOPHILIZED, FOR SOLUTION INTRAVENOUS at 12:23

## 2018-08-07 RX ADMIN — PREGABALIN 200 MG: 100 CAPSULE ORAL at 09:42

## 2018-08-07 RX ADMIN — OXYCODONE HYDROCHLORIDE 20 MG: 20 TABLET, FILM COATED, EXTENDED RELEASE ORAL at 09:42

## 2018-08-07 ASSESSMENT — PAIN SCALES - GENERAL
PAINLEVEL_OUTOF10: 5
PAINLEVEL_OUTOF10: 0
PAINLEVEL_OUTOF10: 0

## 2018-08-07 NOTE — PROGRESS NOTES
vascular - patient continues to decline  · Consulted by Dr. Caryn Ralph to dose/monitor vancomycin  · Goal trough level:  15-20 mcg/mL  · Patient on day #8 vancomycin/zosyn  ? 8/2: No morning labs ordered for today - I checked a BUN/sCr (resulted at 20/2)  ? 8/3: No labs again today - BUN/sCr today 21/2.1; vancomycin given late last night - rescheduled tonight's dose for 2200  ? 8/4: No morning labs ordered for today, trough level 15.9 mcg/ml is within goal-continue to check BUN/sCR labs daily to monitor renal function   ? 8/5: sCr stable (1.9 today)  ? 8/6: SCr 1.8   ? 8/7: SCr stable at 1.8     Plan:  · Continue Vancomycin 2g IV Q24h  · Will recheck trough when needed. If patient remains hospitalized, will plan to recheck trough tomorrow  · Follow renal function.   · Pharmacist will follow and monitor/adjust dosing as necessary     Thank you for this consult,      Emmy Tejada, PharmD, BCPS 8/7/2018 10:30 AM

## 2018-08-07 NOTE — DISCHARGE SUMMARY
nontender,  Extrem:  No clubbing, cyanosis,  Has on boot RLE  Skin: no rashes or lesions  Psych: A & O x3  Neuro: grossly intact, moves all four extremities.    Capillary Refill: Brisk,< 3 seconds   Peripheral Pulses: +2 palpable, equal bilaterally          Labs: For convenience and continuity at follow-up the following most recent labs are provided:      CBC:    Lab Results   Component Value Date    WBC 13.8 08/06/2018    HGB 7.7 08/06/2018    HCT 23.4 08/06/2018     08/06/2018       Renal:    Lab Results   Component Value Date     08/07/2018    K 4.7 08/07/2018    K 4.5 08/01/2018    CL 97 08/07/2018    CO2 25 08/07/2018    BUN 19 08/07/2018    CREATININE 1.8 08/07/2018    CALCIUM 8.5 08/07/2018    PHOS 3.1 08/07/2018         Significant Diagnostic Studies    Radiology:   XR CHEST PORTABLE   Final Result   1. Left-sided PICC line tip in the left inguinal related vein   projection. 2. No pneumothorax on the right on the left. XR CHEST PORTABLE   Final Result      US DUP LOWER EXTREMITY RIGHT CLARK   Final Result      XR PELVIS (1-2 VIEWS)   Final Result   1. Moderate bilateral osteoarthritis of the hips. 2. No acute fracture identified. If there is persistent clinical pain   or symptomatology, a return to medical attention within 2-7 days and   further imaging is recommended. .   3. Please note this study does not exclude underlying infection or   cellulitis. XR KNEE RIGHT (3 VIEWS)   Final Result   1. No acute fracture identified. If there is persistent clinical pain   or symptomatology, a return to medical attention within 2-7 days and   further imaging is recommended. .   2. Please note this study does not exclude underlying infection         XR HIP RIGHT (2-3 VIEWS)   Final Result   1. Moderate right hip osteoarthritis. 2. No acute fracture identified.  If there is persistent clinical pain   or symptomatology, a return to medical attention within 2-7 days and   further imaging is MG tablet Take 4 mg by mouth 3 times daily      amLODIPine (NORVASC) 10 MG tablet Take 10 mg by mouth every morning       LORazepam (ATIVAN) 1 MG tablet Take 1 mg by mouth nightly as needed for Anxiety. .      cloNIDine (CATAPRES) 0.2 MG tablet Take 0.2 mg by mouth 3 times daily      pregabalin (LYRICA) 100 MG capsule Take 200 mg by mouth 3 times daily. Johan Counter oxyCODONE (OXYCONTIN) 10 MG extended release tablet Take 10 mg by mouth every 12 hours. Johan Counter spironolactone (ALDACTONE) 50 MG tablet Take 50 mg by mouth every morning       atorvastatin (LIPITOR) 10 MG tablet Take 10 mg by mouth daily       esomeprazole (NEXIUM) 40 MG delayed release capsule Take 40 mg by mouth every morning (before breakfast)      ! ! warfarin (COUMADIN) 5 MG tablet Take 5 mg by mouth four times a week 5 mg 4 times weekly (at night) --Sunday, Tuesday, Thursday, Saturday   10 mg 3 times weekly (taken at night)--Monday, Wednesday, Friday       !! - Potential duplicate medications found. Please discuss with provider. Time Spent on discharge is more than 35 min  in the examination, evaluation, counseling and review of medications and discharge plan. Signed: My Fraire MD   8/7/2018      Thank you No primary care provider on file. for the opportunity to be involved in this patient's care. If you have any questions or concerns please feel free to contact me at [phone].

## 2018-08-07 NOTE — PROGRESS NOTES
Called to check \"clotted\" off line. Obvious kink in catheter. Dressing removed   Catheter straightened out and dressing reapplied.   Good blood return  Flushes easily

## 2018-08-07 NOTE — PROGRESS NOTES
700 ml   Net             -100 ml       Exam:    BP (!) 100/58   Pulse 65   Temp 97.8 °F (36.6 °C)   Resp 18   Ht 6' 2\" (1.88 m)   Wt (!) 336 lb 12.8 oz (152.8 kg)   SpO2 97%   BMI 43.24 kg/m²       Gen: well developed  HEENT: NC/AT, moist mucous membranes,   Neck: supple, trachea midline, no anterior cervical or SC LAD  Heart:  Normal s1/s2, RRR, no murmurs, gallops, or rubs. Lungs: cta  bilaterally,   Abd: bowel sounds present, soft, nontender,  Extrem:  No clubbing, cyanosis,  Has on boot RLE  Skin: no rashes or lesions  Psych: A & O x3  Neuro: grossly intact, moves all four extremities.    Capillary Refill: Brisk,< 3 seconds   Peripheral Pulses: +2 palpable, equal bilaterally                 Labs:   Recent Labs      08/05/18   1057  08/06/18   2255   WBC  16.5*  13.8*   HGB  8.3*  7.7*   HCT  26.0*  23.4*   PLT  308  267     Recent Labs      08/05/18   0439  08/06/18   0620  08/07/18   0541   NA   --    --   133   K   --    --   4.7   CL   --    --   97*   CO2   --    --   25   BUN  18  18  19   CREATININE  1.9*  1.8*  1.8*   CALCIUM   --    --   8.5*   PHOS   --    --   3.1     No results for input(s): AST, ALT, BILIDIR, BILITOT, ALKPHOS in the last 72 hours. Recent Labs      08/05/18   0439  08/06/18   0620  08/07/18   0541   INR  1.7  1.5  1.6     No results for input(s): CKTOTAL, TROPONINI in the last 72 hours. No results for input(s): AST, ALT, ALB, BILIDIR, BILITOT, ALKPHOS in the last 72 hours. No results for input(s): LACTA in the last 72 hours.   Lab Results   Component Value Date    LABURIC 6.6 06/05/2018     No results found for: AMMONIA    Assessment:  Cellulitis and abscess of toe of right foot [L03.031, L02.611] 07/31/2018     PVD (peripheral vascular disease) ([facility] [I73.9] 07/31/2018    Absent foot pulse [R09.89] 07/31/2018    Ulcer of right lower extremity ([facility] [L97.919] 07/31/2018    Leukocytosis [D72.829] 07/31/2018    Microcytic anemia [D50.9] 07/31/2018    Stage 3

## 2018-08-07 NOTE — PROGRESS NOTES
[address] [place] [address] [facility]  Progress Note    SUBJECTIVE:  Chief Complaint   Patient presents with    Wound Infection     sent by podiatrist dr Fei Villalta for infected right foot wound that has maggots and cellulitis     Fall     was here friday for same, states he has tailbone pain, right hip, knee, and leg pain      Patient is tolerating medications. No reported adverse drug reactions. Up in chair. No nausea, vomiting, diarrhea. Afebrile. No complaints. Review of systems:  As stated above in the chief complaint, otherwise negative.     Medications:  Scheduled Meds:   cefTRIAXone (ROCEPHIN) IV  2 g Intravenous Q24H    warfarin  7.5 mg Oral Daily    lidocaine 1 % injection  5 mL Intradermal Once    sodium chloride flush  10 mL Intravenous 2 times per day    oxyCODONE  20 mg Oral Q12H    vancomycin  2,000 mg Intravenous Q24H    aspirin  81 mg Oral Daily    insulin glargine  30 Units Subcutaneous BID    sodium chloride flush  10 mL Intravenous 2 times per day    amLODIPine  10 mg Oral QAM    atorvastatin  10 mg Oral Daily    cilostazol  100 mg Oral BID    cloNIDine  0.2 mg Oral TID    DULoxetine  120 mg Oral QAM    pantoprazole  40 mg Oral QAM AC    lactulose  20 g Oral BID    linagliptin  5 mg Oral Daily    lisinopril  10 mg Oral Daily    metoprolol  100 mg Oral BID    pregabalin  200 mg Oral TID    spironolactone  50 mg Oral QAM    tiZANidine  4 mg Oral TID    insulin lispro  0-18 Units Subcutaneous TID WC    insulin lispro  0-9 Units Subcutaneous Nightly    nicotine  1 patch Transdermal Daily     Continuous Infusions:   dextrose       PRN Meds:sodium chloride flush, HYDROcodone 5 mg - acetaminophen, sodium chloride flush, acetaminophen, LORazepam, glucose, dextrose, glucagon (rDNA), dextrose, magnesium hydroxide, ondansetron, morphine    OBJECTIVE:  BP (!) 100/58   Pulse 65   Temp 97.8 °F (36.6 °C)   Resp 18   Ht 6' 2\" (1.88 m)   Wt (!) 336 lb 12.8 oz

## 2018-10-24 ENCOUNTER — HOSPITAL ENCOUNTER (INPATIENT)
Age: 63
LOS: 12 days | Discharge: NURSING, REHAB OR LONG-TERM CARE | DRG: 617 | End: 2018-11-05
Attending: EMERGENCY MEDICINE | Admitting: INTERNAL MEDICINE
Payer: MEDICARE

## 2018-10-24 DIAGNOSIS — I96 GANGRENE OF RIGHT FOOT (HCC): Primary | ICD-10-CM

## 2018-10-24 DIAGNOSIS — M86.9 OSTEOMYELITIS OF RIGHT FOOT, UNSPECIFIED TYPE (HCC): ICD-10-CM

## 2018-10-24 PROBLEM — L97.512 CHRONIC ULCER OF RIGHT FOOT WITH FAT LAYER EXPOSED (HCC): Status: ACTIVE | Noted: 2018-10-24

## 2018-10-24 LAB
ALBUMIN SERPL-MCNC: 3 G/DL (ref 3.5–5.2)
ALP BLD-CCNC: 124 U/L (ref 40–129)
ALT SERPL-CCNC: 25 U/L (ref 0–40)
ANION GAP SERPL CALCULATED.3IONS-SCNC: 11 MMOL/L (ref 7–16)
AST SERPL-CCNC: 17 U/L (ref 0–39)
BASOPHILS ABSOLUTE: 0.06 E9/L (ref 0–0.2)
BASOPHILS RELATIVE PERCENT: 0.5 % (ref 0–2)
BILIRUB SERPL-MCNC: 0.2 MG/DL (ref 0–1.2)
BUN BLDV-MCNC: 14 MG/DL (ref 8–23)
C-REACTIVE PROTEIN: 10.6 MG/DL (ref 0–0.4)
CALCIUM SERPL-MCNC: 9.2 MG/DL (ref 8.6–10.2)
CHLORIDE BLD-SCNC: 98 MMOL/L (ref 98–107)
CO2: 26 MMOL/L (ref 22–29)
CREAT SERPL-MCNC: 1.5 MG/DL (ref 0.7–1.2)
EOSINOPHILS ABSOLUTE: 0.35 E9/L (ref 0.05–0.5)
EOSINOPHILS RELATIVE PERCENT: 2.9 % (ref 0–6)
GFR AFRICAN AMERICAN: 58
GFR NON-AFRICAN AMERICAN: 58 ML/MIN/1.73
GLUCOSE BLD-MCNC: 224 MG/DL (ref 74–109)
HCT VFR BLD CALC: 27.1 % (ref 37–54)
HEMOGLOBIN: 8.1 G/DL (ref 12.5–16.5)
IMMATURE GRANULOCYTES #: 0.06 E9/L
IMMATURE GRANULOCYTES %: 0.5 % (ref 0–5)
LACTIC ACID: 1.5 MMOL/L (ref 0.5–2.2)
LYMPHOCYTES ABSOLUTE: 2.6 E9/L (ref 1.5–4)
LYMPHOCYTES RELATIVE PERCENT: 21.7 % (ref 20–42)
MCH RBC QN AUTO: 21.6 PG (ref 26–35)
MCHC RBC AUTO-ENTMCNC: 29.9 % (ref 32–34.5)
MCV RBC AUTO: 72.3 FL (ref 80–99.9)
METER GLUCOSE: 279 MG/DL (ref 70–110)
MONOCYTES ABSOLUTE: 0.79 E9/L (ref 0.1–0.95)
MONOCYTES RELATIVE PERCENT: 6.6 % (ref 2–12)
NEUTROPHILS ABSOLUTE: 8.13 E9/L (ref 1.8–7.3)
NEUTROPHILS RELATIVE PERCENT: 67.8 % (ref 43–80)
PDW BLD-RTO: 17 FL (ref 11.5–15)
PLATELET # BLD: 255 E9/L (ref 130–450)
PMV BLD AUTO: 11.3 FL (ref 7–12)
POTASSIUM SERPL-SCNC: 4.1 MMOL/L (ref 3.5–5)
RBC # BLD: 3.75 E12/L (ref 3.8–5.8)
SEDIMENTATION RATE, ERYTHROCYTE: 128 MM/HR (ref 0–15)
SODIUM BLD-SCNC: 135 MMOL/L (ref 132–146)
TOTAL PROTEIN: 8.3 G/DL (ref 6.4–8.3)
WBC # BLD: 12 E9/L (ref 4.5–11.5)

## 2018-10-24 PROCEDURE — 1200000000 HC SEMI PRIVATE

## 2018-10-24 PROCEDURE — 83605 ASSAY OF LACTIC ACID: CPT

## 2018-10-24 PROCEDURE — 99284 EMERGENCY DEPT VISIT MOD MDM: CPT

## 2018-10-24 PROCEDURE — 80053 COMPREHEN METABOLIC PANEL: CPT

## 2018-10-24 PROCEDURE — 36415 COLL VENOUS BLD VENIPUNCTURE: CPT

## 2018-10-24 PROCEDURE — 2580000003 HC RX 258: Performed by: EMERGENCY MEDICINE

## 2018-10-24 PROCEDURE — 6360000002 HC RX W HCPCS: Performed by: EMERGENCY MEDICINE

## 2018-10-24 PROCEDURE — 87186 SC STD MICRODIL/AGAR DIL: CPT

## 2018-10-24 PROCEDURE — 87040 BLOOD CULTURE FOR BACTERIA: CPT

## 2018-10-24 PROCEDURE — 6370000000 HC RX 637 (ALT 250 FOR IP): Performed by: INTERNAL MEDICINE

## 2018-10-24 PROCEDURE — 85025 COMPLETE CBC W/AUTO DIFF WBC: CPT

## 2018-10-24 PROCEDURE — 85651 RBC SED RATE NONAUTOMATED: CPT

## 2018-10-24 PROCEDURE — 86140 C-REACTIVE PROTEIN: CPT

## 2018-10-24 PROCEDURE — 82962 GLUCOSE BLOOD TEST: CPT

## 2018-10-24 RX ORDER — SPIRONOLACTONE 25 MG/1
50 TABLET ORAL EVERY MORNING
Status: DISCONTINUED | OUTPATIENT
Start: 2018-10-25 | End: 2018-11-05 | Stop reason: HOSPADM

## 2018-10-24 RX ORDER — PREGABALIN 100 MG/1
200 CAPSULE ORAL 3 TIMES DAILY
Status: DISCONTINUED | OUTPATIENT
Start: 2018-10-24 | End: 2018-11-05 | Stop reason: HOSPADM

## 2018-10-24 RX ORDER — PANTOPRAZOLE SODIUM 40 MG/1
40 TABLET, DELAYED RELEASE ORAL
Status: DISCONTINUED | OUTPATIENT
Start: 2018-10-25 | End: 2018-11-05 | Stop reason: HOSPADM

## 2018-10-24 RX ORDER — DEXTROSE MONOHYDRATE 50 MG/ML
100 INJECTION, SOLUTION INTRAVENOUS PRN
Status: DISCONTINUED | OUTPATIENT
Start: 2018-10-24 | End: 2018-11-05 | Stop reason: HOSPADM

## 2018-10-24 RX ORDER — METOPROLOL TARTRATE 50 MG/1
100 TABLET, FILM COATED ORAL 2 TIMES DAILY
Status: DISCONTINUED | OUTPATIENT
Start: 2018-10-24 | End: 2018-11-05 | Stop reason: HOSPADM

## 2018-10-24 RX ORDER — SODIUM CHLORIDE 0.9 % (FLUSH) 0.9 %
10 SYRINGE (ML) INJECTION PRN
Status: DISCONTINUED | OUTPATIENT
Start: 2018-10-24 | End: 2018-10-25 | Stop reason: SDUPTHER

## 2018-10-24 RX ORDER — DULOXETIN HYDROCHLORIDE 60 MG/1
120 CAPSULE, DELAYED RELEASE ORAL EVERY MORNING
Status: DISCONTINUED | OUTPATIENT
Start: 2018-10-25 | End: 2018-11-05 | Stop reason: HOSPADM

## 2018-10-24 RX ORDER — ONDANSETRON 2 MG/ML
4 INJECTION INTRAMUSCULAR; INTRAVENOUS EVERY 6 HOURS PRN
Status: DISCONTINUED | OUTPATIENT
Start: 2018-10-24 | End: 2018-11-05 | Stop reason: HOSPADM

## 2018-10-24 RX ORDER — OXYCODONE HCL 10 MG/1
10 TABLET, FILM COATED, EXTENDED RELEASE ORAL EVERY 8 HOURS
Status: DISCONTINUED | OUTPATIENT
Start: 2018-10-24 | End: 2018-11-02

## 2018-10-24 RX ORDER — ATORVASTATIN CALCIUM 10 MG/1
10 TABLET, FILM COATED ORAL DAILY
Status: DISCONTINUED | OUTPATIENT
Start: 2018-10-25 | End: 2018-11-05 | Stop reason: HOSPADM

## 2018-10-24 RX ORDER — AMLODIPINE BESYLATE 10 MG/1
10 TABLET ORAL EVERY MORNING
Status: DISCONTINUED | OUTPATIENT
Start: 2018-10-25 | End: 2018-11-05 | Stop reason: HOSPADM

## 2018-10-24 RX ORDER — LORAZEPAM 1 MG/1
1 TABLET ORAL NIGHTLY PRN
Status: DISCONTINUED | OUTPATIENT
Start: 2018-10-24 | End: 2018-11-05 | Stop reason: HOSPADM

## 2018-10-24 RX ORDER — SODIUM CHLORIDE 0.9 % (FLUSH) 0.9 %
10 SYRINGE (ML) INJECTION PRN
Status: DISCONTINUED | OUTPATIENT
Start: 2018-10-24 | End: 2018-10-24 | Stop reason: SDUPTHER

## 2018-10-24 RX ORDER — CILOSTAZOL 100 MG/1
100 TABLET ORAL 2 TIMES DAILY
Status: DISCONTINUED | OUTPATIENT
Start: 2018-10-24 | End: 2018-11-05 | Stop reason: HOSPADM

## 2018-10-24 RX ORDER — NICOTINE POLACRILEX 4 MG
15 LOZENGE BUCCAL PRN
Status: DISCONTINUED | OUTPATIENT
Start: 2018-10-24 | End: 2018-11-05 | Stop reason: HOSPADM

## 2018-10-24 RX ORDER — LISINOPRIL 10 MG/1
10 TABLET ORAL DAILY
Status: DISCONTINUED | OUTPATIENT
Start: 2018-10-25 | End: 2018-11-05 | Stop reason: HOSPADM

## 2018-10-24 RX ORDER — DEXTROSE MONOHYDRATE 25 G/50ML
12.5 INJECTION, SOLUTION INTRAVENOUS PRN
Status: DISCONTINUED | OUTPATIENT
Start: 2018-10-24 | End: 2018-11-05 | Stop reason: HOSPADM

## 2018-10-24 RX ORDER — ONDANSETRON 2 MG/ML
4 INJECTION INTRAMUSCULAR; INTRAVENOUS EVERY 6 HOURS PRN
Status: DISCONTINUED | OUTPATIENT
Start: 2018-10-24 | End: 2018-10-24 | Stop reason: SDUPTHER

## 2018-10-24 RX ORDER — SODIUM CHLORIDE 0.9 % (FLUSH) 0.9 %
10 SYRINGE (ML) INJECTION EVERY 12 HOURS SCHEDULED
Status: DISCONTINUED | OUTPATIENT
Start: 2018-10-24 | End: 2018-11-05 | Stop reason: HOSPADM

## 2018-10-24 RX ORDER — DEXTROSE MONOHYDRATE 50 MG/ML
100 INJECTION, SOLUTION INTRAVENOUS PRN
Status: DISCONTINUED | OUTPATIENT
Start: 2018-10-24 | End: 2018-10-24 | Stop reason: SDUPTHER

## 2018-10-24 RX ORDER — CLONIDINE HYDROCHLORIDE 0.2 MG/1
0.2 TABLET ORAL 3 TIMES DAILY
Status: DISCONTINUED | OUTPATIENT
Start: 2018-10-24 | End: 2018-11-05 | Stop reason: HOSPADM

## 2018-10-24 RX ORDER — DEXTROSE MONOHYDRATE 25 G/50ML
12.5 INJECTION, SOLUTION INTRAVENOUS PRN
Status: DISCONTINUED | OUTPATIENT
Start: 2018-10-24 | End: 2018-10-24 | Stop reason: SDUPTHER

## 2018-10-24 RX ORDER — INSULIN GLARGINE 100 [IU]/ML
15 INJECTION, SOLUTION SUBCUTANEOUS 2 TIMES DAILY
Status: DISCONTINUED | OUTPATIENT
Start: 2018-10-24 | End: 2018-11-05 | Stop reason: HOSPADM

## 2018-10-24 RX ORDER — NICOTINE POLACRILEX 4 MG
15 LOZENGE BUCCAL PRN
Status: DISCONTINUED | OUTPATIENT
Start: 2018-10-24 | End: 2018-10-24 | Stop reason: SDUPTHER

## 2018-10-24 RX ORDER — ASPIRIN 81 MG/1
81 TABLET, CHEWABLE ORAL DAILY
Status: DISCONTINUED | OUTPATIENT
Start: 2018-10-25 | End: 2018-11-05 | Stop reason: HOSPADM

## 2018-10-24 RX ORDER — PIPERACILLIN SODIUM, TAZOBACTAM SODIUM 4; .5 G/20ML; G/20ML
4.5 INJECTION, POWDER, LYOPHILIZED, FOR SOLUTION INTRAVENOUS ONCE
Status: COMPLETED | OUTPATIENT
Start: 2018-10-24 | End: 2018-10-24

## 2018-10-24 RX ORDER — SODIUM CHLORIDE 0.9 % (FLUSH) 0.9 %
10 SYRINGE (ML) INJECTION EVERY 12 HOURS SCHEDULED
Status: DISCONTINUED | OUTPATIENT
Start: 2018-10-24 | End: 2018-10-24 | Stop reason: SDUPTHER

## 2018-10-24 RX ORDER — TIZANIDINE 4 MG/1
4 TABLET ORAL EVERY 8 HOURS PRN
Status: DISCONTINUED | OUTPATIENT
Start: 2018-10-24 | End: 2018-11-05 | Stop reason: HOSPADM

## 2018-10-24 RX ADMIN — INSULIN LISPRO 5 UNITS: 100 INJECTION, SOLUTION INTRAVENOUS; SUBCUTANEOUS at 23:06

## 2018-10-24 RX ADMIN — METOPROLOL TARTRATE 100 MG: 50 TABLET, FILM COATED ORAL at 23:06

## 2018-10-24 RX ADMIN — PREGABALIN 200 MG: 100 CAPSULE ORAL at 23:06

## 2018-10-24 RX ADMIN — VANCOMYCIN HYDROCHLORIDE 2000 MG: 10 INJECTION, POWDER, LYOPHILIZED, FOR SOLUTION INTRAVENOUS at 18:16

## 2018-10-24 RX ADMIN — INSULIN GLARGINE 15 UNITS: 100 INJECTION, SOLUTION SUBCUTANEOUS at 23:06

## 2018-10-24 RX ADMIN — CLONIDINE HYDROCHLORIDE 0.2 MG: 0.2 TABLET ORAL at 23:07

## 2018-10-24 RX ADMIN — PIPERACILLIN SODIUM, TAZOBACTAM SODIUM 4.5 G: 4; .5 INJECTION, POWDER, LYOPHILIZED, FOR SOLUTION INTRAVENOUS at 17:34

## 2018-10-24 RX ADMIN — CILOSTAZOL 100 MG: 100 TABLET ORAL at 23:06

## 2018-10-24 RX ADMIN — OXYCODONE HYDROCHLORIDE 10 MG: 10 TABLET, FILM COATED, EXTENDED RELEASE ORAL at 23:07

## 2018-10-24 ASSESSMENT — PAIN SCALES - GENERAL
PAINLEVEL_OUTOF10: 10
PAINLEVEL_OUTOF10: 10

## 2018-10-24 ASSESSMENT — ENCOUNTER SYMPTOMS
CHEST TIGHTNESS: 0
ABDOMINAL PAIN: 0

## 2018-10-24 NOTE — H&P
microcytic anemia picture, platelet count 427, lactic acid 1.5. No imaging studies to review tonight. He was given a dose of Vanco and Zosyn. He is being admitted for further management. 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 ([address] 5/22/2018    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 ([address] 5/21/2018    Diabetes mellitus ([address]     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 2 diabetes mellitus, with fat layer exposed ([address] 5/22/2018    DVT, lower extremity (HCC)     right leg     Hyperlipidemia     Hypertension     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[address]     PVD (peripheral vascular disease) ([address]        Past Surgical History:          Procedure Laterality Date    FEMORAL BYPASS      Right - [place], [address] [facility],1 BUR[place] Right 5/24/2018    RIGHT FOOT INCISION AND DRAINAGE WITH PARTIAL BONE RESECTION performed by Kilo Berman DPM at [address] [place] [address] OFFICE/OUTPT VISIT,PROCEDURE ONLY Right 8/3/2018    INCISION AND DRAINAGE MULTIPLE AREAS RIGHT FOOT WITH DEBRIDEMENT SOFT TISSUE performed by Kilo Berman DPM at [place] [address] Right     leg        Medications Prior to Admission:      Prior to Admission medications    Medication Sig Start Date End Date Taking?  Authorizing Provider   cloNIDine (CATAPRES) 0.2 MG tablet Take 1 tablet by mouth 3 times daily Hold if sbp<140 8/7/18  Yes Devang Green MD   amLODIPine (NORVASC) 10 MG tablet Take 1 tablet by mouth every morning Hold if sbp <140 8/7/18  Yes Devang Green MD   lisinopril (PRINIVIL;ZESTRIL) 10 MG tablet Take 1 tablet by mouth daily Hold if sbp<140 8/7/18  Yes Getachew Bravo MD   insulin glargine (LANTUS) 100 UNIT/ML injection vial Inject 15 Units into the skin 2 times daily 5/30/18  Yes Mercedez Rivera MD   insulin lispro (HUMALOG) 100 UNIT/ML injection vial Inject 0-18 Units into the skin 3 times daily (with meals) 5/30/18  Yes Mary Landers MD   insulin lispro (HUMALOG) 100 UNIT/ML injection vial Inject 0-9 Units into the skin nightly 5/30/18  Yes Mary Landers MD   metoprolol (LOPRESSOR) 100 MG tablet Take 100 mg by mouth 2 times daily   Yes Historical Provider, MD   DULoxetine (CYMBALTA) 60 MG extended release capsule Take 120 mg by mouth every morning    Yes Historical Provider, MD   cilostazol (PLETAL) 100 MG tablet Take 100 mg by mouth 2 times daily   Yes Historical Provider, MD   tiZANidine (ZANAFLEX) 4 MG tablet Take 4 mg by mouth every 8 hours as needed    Yes Historical Provider, MD   LORazepam (ATIVAN) 1 MG tablet Take 1 mg by mouth nightly as needed for Anxiety. .   Yes Historical Provider, MD   pregabalin (LYRICA) 100 MG capsule Take 200 mg by mouth 3 times daily. .   Yes Historical Provider, MD   oxyCODONE (OXYCONTIN) 10 MG extended release tablet Take 10 mg by mouth every 8 hours. .   Yes Historical Provider, MD   spironolactone (ALDACTONE) 50 MG tablet Take 50 mg by mouth every morning    Yes Historical Provider, MD   atorvastatin (LIPITOR) 10 MG tablet Take 10 mg by mouth daily    Yes Historical Provider, MD   esomeprazole (NEXIUM) 40 MG delayed release capsule Take 40 mg by mouth every morning (before breakfast)   Yes Historical Provider, MD   aspirin 81 MG chewable tablet Take 1 tablet by mouth daily 8/8/18   Moses Stevenson MD   warfarin (COUMADIN) 10 MG tablet Take 10 mg by mouth three times a week 5 mg 4 times weekly (at night) --Sunday, Tuesday, Thursday, Saturday   10 mg 3 times weekly (taken at night)--Monday, Wednesday, Friday    Historical Provider, MD   warfarin (COUMADIN) 5 MG tablet Take 5 mg by mouth four times a week 5 mg 4 times weekly (at night) --Sunday, Tuesday, Thursday, Saturday   10 mg 3 times weekly (taken at night)--Monday, Wednesday, Friday    Historical Provider, MD       Allergies:  Patient has no known allergies.     Social History:      The patient currently

## 2018-10-24 NOTE — ED PROVIDER NOTES
Adan. Discussed case. They will admit the patient. This patient's ED course included: a personal history and physicial examination and re-evaluation prior to disposition    This patient has remained hemodynamically stable during their ED course. Diagnosis:  1. Gangrene of right foot ([address]    2. Osteomyelitis of right foot, unspecified type ([facility]        Disposition:  Patient's disposition: Admit to med/surg floor  Patient's condition is stable.              Man Lyon DO  10/24/18 1805

## 2018-10-25 ENCOUNTER — APPOINTMENT (OUTPATIENT)
Dept: NUCLEAR MEDICINE | Age: 63
DRG: 617 | End: 2018-10-25
Payer: MEDICARE

## 2018-10-25 PROBLEM — L08.9 DIABETIC FOOT INFECTION (HCC): Status: RESOLVED | Noted: 2018-05-21 | Resolved: 2018-10-25

## 2018-10-25 PROBLEM — E11.628 DIABETIC FOOT INFECTION (HCC): Status: RESOLVED | Noted: 2018-05-21 | Resolved: 2018-10-25

## 2018-10-25 LAB
ANION GAP SERPL CALCULATED.3IONS-SCNC: 12 MMOL/L (ref 7–16)
BASOPHILS ABSOLUTE: 0.04 E9/L (ref 0–0.2)
BASOPHILS RELATIVE PERCENT: 0.4 % (ref 0–2)
BUN BLDV-MCNC: 13 MG/DL (ref 8–23)
CALCIUM SERPL-MCNC: 8.8 MG/DL (ref 8.6–10.2)
CHLORIDE BLD-SCNC: 100 MMOL/L (ref 98–107)
CO2: 26 MMOL/L (ref 22–29)
CREAT SERPL-MCNC: 1.6 MG/DL (ref 0.7–1.2)
EOSINOPHILS ABSOLUTE: 0.34 E9/L (ref 0.05–0.5)
EOSINOPHILS RELATIVE PERCENT: 3.2 % (ref 0–6)
FOLATE: 9.8 NG/ML (ref 4.8–24.2)
GFR AFRICAN AMERICAN: 53
GFR NON-AFRICAN AMERICAN: 53 ML/MIN/1.73
GLUCOSE BLD-MCNC: 174 MG/DL (ref 74–109)
HBA1C MFR BLD: 10.4 % (ref 4–5.6)
HCT VFR BLD CALC: 24.8 % (ref 37–54)
HEMOGLOBIN: 7.6 G/DL (ref 12.5–16.5)
IMMATURE GRANULOCYTES #: 0.04 E9/L
IMMATURE GRANULOCYTES %: 0.4 % (ref 0–5)
INR BLD: 1.9
IRON SATURATION: 14 % (ref 20–55)
IRON: 20 MCG/DL (ref 59–158)
LYMPHOCYTES ABSOLUTE: 2.25 E9/L (ref 1.5–4)
LYMPHOCYTES RELATIVE PERCENT: 21.1 % (ref 20–42)
MCH RBC QN AUTO: 21.2 PG (ref 26–35)
MCHC RBC AUTO-ENTMCNC: 30.6 % (ref 32–34.5)
MCV RBC AUTO: 69.1 FL (ref 80–99.9)
METER GLUCOSE: 120 MG/DL (ref 70–110)
METER GLUCOSE: 124 MG/DL (ref 70–110)
METER GLUCOSE: 138 MG/DL (ref 70–110)
METER GLUCOSE: 177 MG/DL (ref 70–110)
MONOCYTES ABSOLUTE: 0.72 E9/L (ref 0.1–0.95)
MONOCYTES RELATIVE PERCENT: 6.7 % (ref 2–12)
NEUTROPHILS ABSOLUTE: 7.29 E9/L (ref 1.8–7.3)
NEUTROPHILS RELATIVE PERCENT: 68.2 % (ref 43–80)
PDW BLD-RTO: 17 FL (ref 11.5–15)
PLATELET # BLD: 222 E9/L (ref 130–450)
PMV BLD AUTO: 10.8 FL (ref 7–12)
POTASSIUM REFLEX MAGNESIUM: 4 MMOL/L (ref 3.5–5)
PROCALCITONIN: 0.09 NG/ML (ref 0–0.08)
PROTHROMBIN TIME: 22 SEC (ref 9.3–12.4)
RBC # BLD: 3.59 E12/L (ref 3.8–5.8)
SODIUM BLD-SCNC: 138 MMOL/L (ref 132–146)
T4 FREE: 1.1 NG/DL (ref 0.93–1.7)
TOTAL IRON BINDING CAPACITY: 146 MCG/DL (ref 250–450)
TSH SERPL DL<=0.05 MIU/L-ACNC: 1.1 UIU/ML (ref 0.27–4.2)
VITAMIN B-12: 522 PG/ML (ref 211–946)
WBC # BLD: 10.7 E9/L (ref 4.5–11.5)

## 2018-10-25 PROCEDURE — 6360000002 HC RX W HCPCS: Performed by: FAMILY MEDICINE

## 2018-10-25 PROCEDURE — 82962 GLUCOSE BLOOD TEST: CPT

## 2018-10-25 PROCEDURE — 2580000003 HC RX 258: Performed by: INTERNAL MEDICINE

## 2018-10-25 PROCEDURE — 82746 ASSAY OF FOLIC ACID SERUM: CPT

## 2018-10-25 PROCEDURE — 2580000003 HC RX 258: Performed by: FAMILY MEDICINE

## 2018-10-25 PROCEDURE — 78315 BONE IMAGING 3 PHASE: CPT

## 2018-10-25 PROCEDURE — 83550 IRON BINDING TEST: CPT

## 2018-10-25 PROCEDURE — 85610 PROTHROMBIN TIME: CPT

## 2018-10-25 PROCEDURE — A9503 TC99M MEDRONATE: HCPCS | Performed by: RADIOLOGY

## 2018-10-25 PROCEDURE — 84439 ASSAY OF FREE THYROXINE: CPT

## 2018-10-25 PROCEDURE — 36415 COLL VENOUS BLD VENIPUNCTURE: CPT

## 2018-10-25 PROCEDURE — 82607 VITAMIN B-12: CPT

## 2018-10-25 PROCEDURE — 83540 ASSAY OF IRON: CPT

## 2018-10-25 PROCEDURE — 83036 HEMOGLOBIN GLYCOSYLATED A1C: CPT

## 2018-10-25 PROCEDURE — 1200000000 HC SEMI PRIVATE

## 2018-10-25 PROCEDURE — 3430000000 HC RX DIAGNOSTIC RADIOPHARMACEUTICAL: Performed by: RADIOLOGY

## 2018-10-25 PROCEDURE — 99221 1ST HOSP IP/OBS SF/LOW 40: CPT | Performed by: SURGERY

## 2018-10-25 PROCEDURE — 85025 COMPLETE CBC W/AUTO DIFF WBC: CPT

## 2018-10-25 PROCEDURE — 80048 BASIC METABOLIC PNL TOTAL CA: CPT

## 2018-10-25 PROCEDURE — 6370000000 HC RX 637 (ALT 250 FOR IP): Performed by: FAMILY MEDICINE

## 2018-10-25 PROCEDURE — 87040 BLOOD CULTURE FOR BACTERIA: CPT

## 2018-10-25 PROCEDURE — 84443 ASSAY THYROID STIM HORMONE: CPT

## 2018-10-25 PROCEDURE — 6360000002 HC RX W HCPCS: Performed by: INTERNAL MEDICINE

## 2018-10-25 PROCEDURE — 84145 PROCALCITONIN (PCT): CPT

## 2018-10-25 PROCEDURE — 6370000000 HC RX 637 (ALT 250 FOR IP): Performed by: INTERNAL MEDICINE

## 2018-10-25 RX ORDER — FERROUS SULFATE 325(65) MG
325 TABLET ORAL 2 TIMES DAILY WITH MEALS
Status: DISCONTINUED | OUTPATIENT
Start: 2018-10-25 | End: 2018-11-05 | Stop reason: HOSPADM

## 2018-10-25 RX ORDER — SODIUM CHLORIDE 0.9 % (FLUSH) 0.9 %
10 SYRINGE (ML) INJECTION PRN
Status: DISCONTINUED | OUTPATIENT
Start: 2018-10-25 | End: 2018-10-29 | Stop reason: SDUPTHER

## 2018-10-25 RX ORDER — TC 99M MEDRONATE 20 MG/10ML
25 INJECTION, POWDER, LYOPHILIZED, FOR SOLUTION INTRAVENOUS
Status: COMPLETED | OUTPATIENT
Start: 2018-10-25 | End: 2018-10-25

## 2018-10-25 RX ADMIN — CLONIDINE HYDROCHLORIDE 0.2 MG: 0.2 TABLET ORAL at 09:07

## 2018-10-25 RX ADMIN — OXYCODONE HYDROCHLORIDE 10 MG: 10 TABLET, FILM COATED, EXTENDED RELEASE ORAL at 23:34

## 2018-10-25 RX ADMIN — PREGABALIN 200 MG: 100 CAPSULE ORAL at 09:07

## 2018-10-25 RX ADMIN — MEROPENEM 1 G: 1 INJECTION, POWDER, FOR SOLUTION INTRAVENOUS at 12:09

## 2018-10-25 RX ADMIN — CEFEPIME 1 G: 1 INJECTION, POWDER, FOR SOLUTION INTRAMUSCULAR; INTRAVENOUS at 03:27

## 2018-10-25 RX ADMIN — Medication 10 ML: at 23:34

## 2018-10-25 RX ADMIN — DAPTOMYCIN 500 MG: 500 INJECTION, POWDER, LYOPHILIZED, FOR SOLUTION INTRAVENOUS at 12:13

## 2018-10-25 RX ADMIN — MEROPENEM 1 G: 1 INJECTION, POWDER, FOR SOLUTION INTRAVENOUS at 18:35

## 2018-10-25 RX ADMIN — AMLODIPINE BESYLATE 10 MG: 10 TABLET ORAL at 09:08

## 2018-10-25 RX ADMIN — FERROUS SULFATE TAB 325 MG (65 MG ELEMENTAL FE) 325 MG: 325 (65 FE) TAB at 10:26

## 2018-10-25 RX ADMIN — Medication 25 MILLICURIE: at 14:36

## 2018-10-25 RX ADMIN — SPIRONOLACTONE 50 MG: 25 TABLET ORAL at 09:07

## 2018-10-25 RX ADMIN — METOPROLOL TARTRATE 100 MG: 50 TABLET, FILM COATED ORAL at 09:07

## 2018-10-25 RX ADMIN — CLONIDINE HYDROCHLORIDE 0.2 MG: 0.2 TABLET ORAL at 13:44

## 2018-10-25 RX ADMIN — OXYCODONE HYDROCHLORIDE 10 MG: 10 TABLET, FILM COATED, EXTENDED RELEASE ORAL at 13:44

## 2018-10-25 RX ADMIN — Medication 10 ML: at 03:27

## 2018-10-25 RX ADMIN — CILOSTAZOL 100 MG: 100 TABLET ORAL at 09:07

## 2018-10-25 RX ADMIN — CEFEPIME 1 G: 1 INJECTION, POWDER, FOR SOLUTION INTRAMUSCULAR; INTRAVENOUS at 10:26

## 2018-10-25 RX ADMIN — LISINOPRIL 10 MG: 10 TABLET ORAL at 09:07

## 2018-10-25 RX ADMIN — Medication 10 ML: at 09:08

## 2018-10-25 RX ADMIN — DULOXETINE HYDROCHLORIDE 120 MG: 60 CAPSULE, DELAYED RELEASE ORAL at 09:07

## 2018-10-25 RX ADMIN — PANTOPRAZOLE SODIUM 40 MG: 40 TABLET, DELAYED RELEASE ORAL at 07:47

## 2018-10-25 RX ADMIN — OXYCODONE HYDROCHLORIDE 10 MG: 10 TABLET, FILM COATED, EXTENDED RELEASE ORAL at 07:48

## 2018-10-25 RX ADMIN — ATORVASTATIN CALCIUM 10 MG: 10 TABLET, FILM COATED ORAL at 09:07

## 2018-10-25 RX ADMIN — VANCOMYCIN HYDROCHLORIDE 1750 MG: 10 INJECTION, POWDER, LYOPHILIZED, FOR SOLUTION INTRAVENOUS at 07:47

## 2018-10-25 RX ADMIN — PREGABALIN 200 MG: 100 CAPSULE ORAL at 13:44

## 2018-10-25 ASSESSMENT — PAIN DESCRIPTION - ORIENTATION
ORIENTATION: RIGHT
ORIENTATION: RIGHT

## 2018-10-25 ASSESSMENT — PAIN SCALES - GENERAL
PAINLEVEL_OUTOF10: 9
PAINLEVEL_OUTOF10: 8
PAINLEVEL_OUTOF10: 8

## 2018-10-25 ASSESSMENT — PAIN DESCRIPTION - FREQUENCY
FREQUENCY: CONTINUOUS
FREQUENCY: INTERMITTENT
FREQUENCY: CONTINUOUS

## 2018-10-25 ASSESSMENT — PAIN DESCRIPTION - PAIN TYPE
TYPE: CHRONIC PAIN

## 2018-10-25 ASSESSMENT — PAIN DESCRIPTION - PROGRESSION
CLINICAL_PROGRESSION: NOT CHANGED
CLINICAL_PROGRESSION: NOT CHANGED

## 2018-10-25 ASSESSMENT — PAIN DESCRIPTION - DESCRIPTORS
DESCRIPTORS: ACHING;DISCOMFORT;SHARP
DESCRIPTORS: ACHING;DISCOMFORT;SORE
DESCRIPTORS: ACHING;DISCOMFORT

## 2018-10-25 ASSESSMENT — PAIN DESCRIPTION - LOCATION
LOCATION: BACK
LOCATION: FOOT
LOCATION: FOOT

## 2018-10-25 ASSESSMENT — PAIN DESCRIPTION - ONSET
ONSET: ON-GOING
ONSET: ON-GOING
ONSET: GRADUAL

## 2018-10-25 NOTE — PROGRESS NOTES
Nutrition Assessment    Type and Reason for Visit: Initial, Positive Nutrition Screen    Nutrition Recommendations: Start oral diet when medically appropriate, To order Ensure HP BID w/ diet  Noted A1C 10.4, pt would benefit from diet education though uninterested in instruction at this time. Malnutrition Assessment:  · Malnutrition Status: No malnutrition  · Context: Acute illness or injury  · Findings of the 6 clinical characteristics of malnutrition (Minimum of 2 out of 6 clinical characteristics is required to make the diagnosis of moderate or severe Protein Calorie Malnutrition based on AND/ASPEN Guidelines):  1. Energy Intake-Less than or equal to 50%,  (x1 day since adm)    2. Weight Loss-No significant weight loss    3. Fat Loss-No significant subcutaneous fat loss    4. Muscle Loss-No significant muscle mass loss    5. Fluid Accumulation-No significant fluid accumulation    6.   Strength-Not measured    Nutrition Diagnosis:   · Problem: Increased nutrient needs  · Etiology: related to Increased demand for energy/nutrients due to (wound healing)     Signs and symptoms:  as evidenced by Presence of wounds    Nutrition Assessment:  · Subjective Assessment: pt resting in bed, minimally interested in responding to this clinician  · Nutrition-Focused Physical Findings: rotund abd, active BS, generalized +1 edema, -I/Os  · Wound Type: Open Wounds, Wound Consult Pending  · Current Nutrition Therapies:  · Oral Diet Orders: NPO   · Anthropometric Measures:  · Ht: 6' 2\" (188 cm)   · Current Body Wt: 326 lb (147.9 kg) (bed scale 10/25)  · Usual Body Wt: 341 lb (154.7 kg) (measured per EMR 5/21/18)  · % Weight Change: 4% wt loss x5 months, insignificant     · Ideal Body Wt: 190 lb (86.2 kg), % Ideal Body 172%  · Adjusted Body Wt: 224 lb (101.6 kg), body weight adjusted for Obesity  · BMI Classification: BMI > or equal to 40.0 Obese Class III  · Comparative Standards (Estimated Nutrition Needs):  · Estimated Daily Total Kcal:  (8-15 kcal/kg CBW)  · Estimated Daily Protein (g): 140-155    Nutrition Risk Level: Low    Nutrition Interventions:   Continued Inpatient Monitoring, Education declined, Coordination of Care    Nutrition Evaluation:   · Evaluation: Goals set   · Goals: Diet progression    · Monitoring: NPO Status, Diet Progression, Skin Integrity, Wound Healing, Fluid Balance, Ascites/Edema, Weight, Comparative Standards, Pertinent Labs    See Adult Nutrition Doc Flowsheet for more detail.      Electronically signed by Jeff Alan MS, RD, LD on 10/25/18 at 3:40 PM    Contact Number: [phone]

## 2018-10-25 NOTE — PROGRESS NOTES
Paged Dr. Susi King via office staff regarding + blood culture from 10/24 with 1 out 2 bottles with Gram + cocci in clusters.

## 2018-10-25 NOTE — CONSULTS
ongoing osteomyelitis here also, under appropriate clinical circumstances. Clinical and possible radiographic correlation recommended. 3. Additional multifocal bilateral lower extremity scintigraphic findings more likely related to degenerative type changes as noted. ALERT: THIS IS AN ABNORMAL REPORT    Vitals:    BP (!) 112/55   Pulse 60   Temp 97.5 °F (36.4 °C) (Temporal)   Resp 17   Ht 6' 2\" (1.88 m)   Wt (!) 326 lb 6.4 oz (148.1 kg)   SpO2 97%   BMI 41.91 kg/m²     Derm: Thin and shiny with loss of hair growth. Ulcer distal left hallux 1cm diameter and 2-3mm depth. No signs of infection noted. There are multiple ulcers right heel, lateral foot, and dorsal midfoot. There is odor noted right foot. Some black eschar along the lateral fibula right. Previous incision scar for osteomyelitis is healed. NEUROLOGIC: Sensation is decreased to the feet. VASCULAR: Nonpalpable dp and pt pulses b/l. MUSCULOSKELETAL: Decreased muscle strength b/l. Wound Care Documentation:  Negative Pressure Wound Therapy Foot Plantar;Right (Active)   Number of days: 149       Wound 05/29/18 Foot Right;Plantar (Active)   Dressing Status Old drainage; Reinforced 10/25/2018 12:15 PM   Number of days: 149       Wound 07/27/18 Degloving Foot Left  (Active)   Number of days: 90       Wound 07/31/18 Other (Comment) Foot Right;Plantar (Active)   Number of days: 85       Wound 07/31/18 Other (Comment) Foot Right; Anterior (Active)   Number of days: 85       Incision 08/03/18 Foot Right (Active)   Number of days: 83       Laboratory:    CBC:   Lab Results   Component Value Date    WBC 10.7 10/25/2018    RBC 3.59 10/25/2018    HGB 7.6 10/25/2018    HCT 24.8 10/25/2018    MCV 69.1 10/25/2018    MCH 21.2 10/25/2018    MCHC 30.6 10/25/2018    RDW 17.0 10/25/2018     10/25/2018    MPV 10.8 10/25/2018     CBC with Differential:    Lab Results   Component Value Date    WBC 10.7 10/25/2018    RBC 3.59 10/25/2018    HGB 7.6 10/25/2018 LEUKOCYTESUR Negative 2018    UROBILINOGEN 0.2 2018    BILIRUBINUR Negative 2018    BLOODU Negative 2018    GLUCOSEU Negative 2018    KETUA Negative 2018    AMORPHOUS FEW 2018     HgBA1c:    Lab Results   Component Value Date    LABA1C 10.4 10/25/2018     Urine Toxicology:  No results found for: Ivana Darrian, COCAINESCRN, LABOPIA, LABPHEN  24 Hour Urine for Protein:  No results found for: UTV, HOURSC, URINEVOLUME  24 Hour Urine for Creatinine Clearance:  No results found for: [id number], HOURSC, UTV   Nm Bone Scan 3 Phase    Result Date: 10/25/2018  Patient MRN: [id number] : 1955 Age:  63 years Gender: Male Order Date: 10/24/2018 9:15 PM Exam: NM BONE SCAN 3 PHASE Number of Images: views Indication:  \"Osteomyelitis, right foot osteomyelitis\". Reported right mid foot diabetic ulcer. COMPARISON: No prior comparison radionuclide bone scan. Correlation made with 2018 right foot radiographs. No more recent correlating right foot radiographs available at present. TECHNIQUE:  52.3 millicuries technetium 90F MDP administered IV, with immediate vascular flow and vascular pool imaging obtained over bilateral foot/ankle regions, with subsequent 4-hour delayed scanning performed over essentially entire skeleton. FINDINGS: Markedly abnormally and asymmetrically increased generalized activity present at right foot and to lesser degree right ankle and distal calf, on immediate vascular flow imaging. Asymmetrically and abnormally increased generalized activity in particular at right midfoot to forefoot on immediate vascular pool imaging, more focally increased at right mid foot. On subsequent delayed imaging, markedly abnormally increased activity present at right mid foot. Focal increased activity also evident at anterior left forefoot medially, possibly at first or second digit, on immediate vascular flow, immediate vascular pool, and subsequent delayed scanning.  Mildly

## 2018-10-25 NOTE — PROGRESS NOTES
Diabetic foot infection ([facility] [address]      Plan:  Recurrent Diabetic Foot Ulcer   -- Planned evaluation of wound for Deep infection   -- Awaiting Podiatry Evaluation and imaging studies to help guide treatment   -- On antibiotics as per infectious disease --> awaiting evaluation   -- Holding Coumadin / NPO today for Late Post  -- Wound Care    Diabetes Mellitus Type II with Vascular Complications  -- Blood Sugar adequately controlled  -- Continue regimen     Chronic Kidney Disease   -- Stable    Rest chronic Medical issues remain stable at this time   -- Continue home medications         Prognosis:  Fair    Code status:  Full     DVT prophylaxis: [] Lovenox  [] SQ Heparin  [] SCDs  [x] warfarin/oral direct thrombin inhibitor [] Encourage ambulation  GI prophylaxis: [x] [id number]  [] not indicated  Diet:  Diet NPO, After Midnight    Disposition:  [] Home  [] Home with home health [] Rehab [] Psych [] SNF  [] LTAC  [] Long term nursing home or group home [] Transfer to ICU  [] Transfer to PCU [] Other:   -- Disposition remains to be determined base on interventions   -- Not a bed Hold for [facility] --> PT/OT orders placed    Medications:  Scheduled Meds:   ferrous sulfate  325 mg Oral BID WC    vancomycin  1,750 mg Intravenous Q12H    aspirin  81 mg Oral Daily    amLODIPine  10 mg Oral QAM    atorvastatin  10 mg Oral Daily    cilostazol  100 mg Oral BID    cloNIDine  0.2 mg Oral TID    DULoxetine  120 mg Oral QAM    pantoprazole  40 mg Oral QAM AC    insulin glargine  15 Units Subcutaneous BID    lisinopril  10 mg Oral Daily    metoprolol  100 mg Oral BID    oxyCODONE  10 mg Oral Q8H    pregabalin  200 mg Oral TID    spironolactone  50 mg Oral QAM    sodium chloride flush  10 mL Intravenous 2 times per day    insulin lispro  0-18 Units Subcutaneous TID WC    insulin lispro  0-9 Units Subcutaneous Nightly    cefepime  1 g Intravenous Q8H       PRN Meds:  LORazepam, tiZANidine, sodium chloride flush,

## 2018-10-26 ENCOUNTER — APPOINTMENT (OUTPATIENT)
Dept: MRI IMAGING | Age: 63
DRG: 617 | End: 2018-10-26
Payer: MEDICARE

## 2018-10-26 LAB
METER GLUCOSE: 105 MG/DL (ref 70–110)
METER GLUCOSE: 131 MG/DL (ref 70–110)
METER GLUCOSE: 200 MG/DL (ref 70–110)
METER GLUCOSE: 210 MG/DL (ref 70–110)

## 2018-10-26 PROCEDURE — 2580000003 HC RX 258: Performed by: INTERNAL MEDICINE

## 2018-10-26 PROCEDURE — 6360000002 HC RX W HCPCS: Performed by: INTERNAL MEDICINE

## 2018-10-26 PROCEDURE — 6370000000 HC RX 637 (ALT 250 FOR IP): Performed by: FAMILY MEDICINE

## 2018-10-26 PROCEDURE — 6370000000 HC RX 637 (ALT 250 FOR IP): Performed by: INTERNAL MEDICINE

## 2018-10-26 PROCEDURE — 36415 COLL VENOUS BLD VENIPUNCTURE: CPT

## 2018-10-26 PROCEDURE — 82962 GLUCOSE BLOOD TEST: CPT

## 2018-10-26 PROCEDURE — 1200000000 HC SEMI PRIVATE

## 2018-10-26 PROCEDURE — 87040 BLOOD CULTURE FOR BACTERIA: CPT

## 2018-10-26 PROCEDURE — 2580000003 HC RX 258: Performed by: RADIOLOGY

## 2018-10-26 RX ADMIN — SPIRONOLACTONE 50 MG: 25 TABLET ORAL at 09:21

## 2018-10-26 RX ADMIN — OXYCODONE HYDROCHLORIDE 10 MG: 10 TABLET, FILM COATED, EXTENDED RELEASE ORAL at 06:08

## 2018-10-26 RX ADMIN — MEROPENEM 1 G: 1 INJECTION, POWDER, FOR SOLUTION INTRAVENOUS at 18:43

## 2018-10-26 RX ADMIN — CLONIDINE HYDROCHLORIDE 0.2 MG: 0.2 TABLET ORAL at 13:30

## 2018-10-26 RX ADMIN — OXYCODONE HYDROCHLORIDE 10 MG: 10 TABLET, FILM COATED, EXTENDED RELEASE ORAL at 15:02

## 2018-10-26 RX ADMIN — Medication 10 ML: at 09:21

## 2018-10-26 RX ADMIN — CILOSTAZOL 100 MG: 100 TABLET ORAL at 20:55

## 2018-10-26 RX ADMIN — DULOXETINE HYDROCHLORIDE 120 MG: 60 CAPSULE, DELAYED RELEASE ORAL at 09:22

## 2018-10-26 RX ADMIN — PANTOPRAZOLE SODIUM 40 MG: 40 TABLET, DELAYED RELEASE ORAL at 06:08

## 2018-10-26 RX ADMIN — INSULIN LISPRO 6 UNITS: 100 INJECTION, SOLUTION INTRAVENOUS; SUBCUTANEOUS at 17:40

## 2018-10-26 RX ADMIN — MEROPENEM 1 G: 1 INJECTION, POWDER, FOR SOLUTION INTRAVENOUS at 03:02

## 2018-10-26 RX ADMIN — CLONIDINE HYDROCHLORIDE 0.2 MG: 0.2 TABLET ORAL at 20:55

## 2018-10-26 RX ADMIN — ATORVASTATIN CALCIUM 10 MG: 10 TABLET, FILM COATED ORAL at 09:22

## 2018-10-26 RX ADMIN — OXYCODONE HYDROCHLORIDE 10 MG: 10 TABLET, FILM COATED, EXTENDED RELEASE ORAL at 23:25

## 2018-10-26 RX ADMIN — FERROUS SULFATE TAB 325 MG (65 MG ELEMENTAL FE) 325 MG: 325 (65 FE) TAB at 09:22

## 2018-10-26 RX ADMIN — INSULIN GLARGINE 15 UNITS: 100 INJECTION, SOLUTION SUBCUTANEOUS at 09:23

## 2018-10-26 RX ADMIN — Medication 10 ML: at 03:03

## 2018-10-26 RX ADMIN — CILOSTAZOL 100 MG: 100 TABLET ORAL at 09:22

## 2018-10-26 RX ADMIN — PREGABALIN 200 MG: 100 CAPSULE ORAL at 13:30

## 2018-10-26 RX ADMIN — CLONIDINE HYDROCHLORIDE 0.2 MG: 0.2 TABLET ORAL at 09:22

## 2018-10-26 RX ADMIN — METOPROLOL TARTRATE 100 MG: 50 TABLET, FILM COATED ORAL at 20:55

## 2018-10-26 RX ADMIN — AMLODIPINE BESYLATE 10 MG: 10 TABLET ORAL at 09:22

## 2018-10-26 RX ADMIN — ASPIRIN 81 MG CHEWABLE TABLET 81 MG: 81 TABLET CHEWABLE at 09:23

## 2018-10-26 RX ADMIN — INSULIN LISPRO 6 UNITS: 100 INJECTION, SOLUTION INTRAVENOUS; SUBCUTANEOUS at 20:58

## 2018-10-26 RX ADMIN — Medication 10 ML: at 20:55

## 2018-10-26 RX ADMIN — PREGABALIN 200 MG: 100 CAPSULE ORAL at 09:21

## 2018-10-26 RX ADMIN — FERROUS SULFATE TAB 325 MG (65 MG ELEMENTAL FE) 325 MG: 325 (65 FE) TAB at 18:43

## 2018-10-26 RX ADMIN — METOPROLOL TARTRATE 100 MG: 50 TABLET, FILM COATED ORAL at 09:21

## 2018-10-26 RX ADMIN — LISINOPRIL 10 MG: 10 TABLET ORAL at 09:22

## 2018-10-26 RX ADMIN — PREGABALIN 200 MG: 100 CAPSULE ORAL at 20:55

## 2018-10-26 RX ADMIN — INSULIN GLARGINE 15 UNITS: 100 INJECTION, SOLUTION SUBCUTANEOUS at 20:59

## 2018-10-26 ASSESSMENT — PAIN SCALES - GENERAL
PAINLEVEL_OUTOF10: 10
PAINLEVEL_OUTOF10: 7
PAINLEVEL_OUTOF10: 10
PAINLEVEL_OUTOF10: 10
PAINLEVEL_OUTOF10: 0

## 2018-10-26 ASSESSMENT — PAIN DESCRIPTION - ORIENTATION
ORIENTATION: RIGHT;MID
ORIENTATION: MID

## 2018-10-26 ASSESSMENT — PAIN DESCRIPTION - DESCRIPTORS
DESCRIPTORS: ACHING;BURNING;CONSTANT
DESCRIPTORS: ACHING;BURNING;CONSTANT
DESCRIPTORS: ACHING;DISCOMFORT;NAGGING

## 2018-10-26 ASSESSMENT — PAIN DESCRIPTION - LOCATION
LOCATION: BACK;FOOT
LOCATION: BACK
LOCATION: BACK;FOOT

## 2018-10-26 ASSESSMENT — PAIN DESCRIPTION - FREQUENCY: FREQUENCY: INTERMITTENT

## 2018-10-26 ASSESSMENT — PAIN DESCRIPTION - PAIN TYPE
TYPE: ACUTE PAIN
TYPE: ACUTE PAIN
TYPE: CHRONIC PAIN

## 2018-10-26 ASSESSMENT — PAIN DESCRIPTION - ONSET: ONSET: GRADUAL

## 2018-10-26 NOTE — DISCHARGE INSTR - COC
Continuity of Care Form    Patient Name: Josh Jang   :  1955  MRN:  [id number]    Admit date:  10/24/2018  Discharge date:  18    Code Status Order: Full Code   Advance Directives:   [facility] Documentation     Date/Time Healthcare Directive Type of Healthcare Directive Copy in [address] Agent's Name Healthcare Agent's Phone Number    10/24/18 [phone]  Yes, patient has an advance directive for healthcare treatment  Health care treatment directive  No, copy requested from other (See comment)  Adult Children  [address] [facility] [address]  --          Admitting Physician:  Lavell Hayes DO  PCP: Apolinar Ceron MD    Discharging Nurse: [address] [place] [address] Unit/Room#: 8293/9617-C  [address] [facility] Unit Phone Number: [phone]    Emergency Contact:   Extended Emergency Contact Information  Primary Emergency Contact: Karen Priest  Address: [address] of [address] [facility] Phone: [phone]  Relation: Other  Secondary Emergency Contact: Ramakrishna Mccarthy [facility] of [address] [facility] Phone: [phone]  Relation: None    Past Surgical History:  Past Surgical History:   Procedure Laterality Date    FEMORAL BYPASS      Right - Catalino tyler, [address] Right 2018    RIGHT FOOT INCISION AND DRAINAGE WITH PARTIAL BONE RESECTION performed by Mason Nunez DPM at [address] [place] [address] OFFICE/OUTPT VISIT,PROCEDURE ONLY Right 8/3/2018    INCISION AND DRAINAGE MULTIPLE AREAS RIGHT FOOT WITH DEBRIDEMENT SOFT TISSUE performed by Mason Nunez DPM at [facility] Right     leg        Immunization History: There is no immunization history on file for this patient.     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 ([facility]) I70.65    Coagulopathy ([facility]) D68.9    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 2 diabetes mellitus, with fat layer exposed ([facility] [address] E11.621, L97.412    Moderate protein-calorie malnutrition ([facility]) E44.0    Cellulitis and abscess of toe of right foot L03.031, L02.611    PVD (peripheral vascular disease) ([facility]) I73.9    Absent foot pulse R09.89    Ulcer of right lower extremity ([facility]) L97.919    Leukocytosis D72.829    Microcytic anemia D50.9    Stage 3 chronic kidney disease ([facility]) N18.3    Morbid obesity with BMI of 40.0-44.9, adult ([facility]) E66.01, Z68.41    Essential hypertension I10    Tobacco dependence F17.200    Uncontrolled diabetes mellitus with hyperglycemia ([facility]) E11.65    HLD (hyperlipidemia) E78.5    History of DVT (deep vein thrombosis) Z86.718    Anticoagulated Z79.01    Osteomyelitis ([facility]) M86.9    Chronic ulcer of right foot with fat layer exposed ([facility] L97.512       Isolation/Infection:   Isolation          No Isolation            Nurse Assessment:  Last Vital Signs: /70   Pulse 65   Temp 96.9 °F (36.1 °C) (Temporal)   Resp 18   Ht 6' 2\" (1.88 m)   Wt (!) 326 lb 6.4 oz (148.1 kg)   SpO2 93%   BMI 41.91 kg/m²     Last documented pain score (0-10 scale): Pain Level: 7  Last Weight:   Wt Readings from Last 1 Encounters:   10/25/18 (!) 326 lb 6.4 oz (148.1 kg)     Mental Status:  oriented and alert    IV Access:  - PICC - site  L Upper Arm, insertion date: 10 30 18    Nursing Mobility/ADLs:  Walking   Assisted  Transfer  Assisted  Bathing  Assisted  Dressing  Assisted  Toileting  Assisted  Feeding  Independent  Med Admin  Assisted  Med Delivery   whole    Wound Care Documentation and Therapy:  Negative Pressure Wound Therapy Foot Plantar;Right (Active)   Number of days: 150       Wound 05/29/18 Foot Right;Plantar

## 2018-10-27 PROBLEM — R78.81 STAPHYLOCOCCUS AUREUS BACTEREMIA WITHOUT SEPSIS: Status: ACTIVE | Noted: 2018-10-27

## 2018-10-27 PROBLEM — B95.61 STAPHYLOCOCCUS AUREUS BACTEREMIA WITHOUT SEPSIS: Status: ACTIVE | Noted: 2018-10-27

## 2018-10-27 LAB
BLOOD CULTURE, ROUTINE: ABNORMAL
BLOOD CULTURE, ROUTINE: ABNORMAL
METER GLUCOSE: 120 MG/DL (ref 70–110)
METER GLUCOSE: 166 MG/DL (ref 70–110)
METER GLUCOSE: 216 MG/DL (ref 70–110)
METER GLUCOSE: 96 MG/DL (ref 70–110)
ORGANISM: ABNORMAL

## 2018-10-27 PROCEDURE — 6360000002 HC RX W HCPCS: Performed by: INTERNAL MEDICINE

## 2018-10-27 PROCEDURE — 2580000003 HC RX 258: Performed by: INTERNAL MEDICINE

## 2018-10-27 PROCEDURE — 82962 GLUCOSE BLOOD TEST: CPT

## 2018-10-27 PROCEDURE — 6370000000 HC RX 637 (ALT 250 FOR IP): Performed by: FAMILY MEDICINE

## 2018-10-27 PROCEDURE — 6370000000 HC RX 637 (ALT 250 FOR IP): Performed by: INTERNAL MEDICINE

## 2018-10-27 PROCEDURE — 1200000000 HC SEMI PRIVATE

## 2018-10-27 PROCEDURE — 2700000000 HC OXYGEN THERAPY PER DAY

## 2018-10-27 RX ADMIN — DAPTOMYCIN 500 MG: 500 INJECTION, POWDER, LYOPHILIZED, FOR SOLUTION INTRAVENOUS at 12:12

## 2018-10-27 RX ADMIN — MEROPENEM 1 G: 1 INJECTION, POWDER, FOR SOLUTION INTRAVENOUS at 11:18

## 2018-10-27 RX ADMIN — PANTOPRAZOLE SODIUM 40 MG: 40 TABLET, DELAYED RELEASE ORAL at 07:43

## 2018-10-27 RX ADMIN — OXYCODONE HYDROCHLORIDE 10 MG: 10 TABLET, FILM COATED, EXTENDED RELEASE ORAL at 07:42

## 2018-10-27 RX ADMIN — MEROPENEM 1 G: 1 INJECTION, POWDER, FOR SOLUTION INTRAVENOUS at 18:46

## 2018-10-27 RX ADMIN — INSULIN LISPRO 3 UNITS: 100 INJECTION, SOLUTION INTRAVENOUS; SUBCUTANEOUS at 23:15

## 2018-10-27 RX ADMIN — PREGABALIN 200 MG: 100 CAPSULE ORAL at 14:21

## 2018-10-27 RX ADMIN — FERROUS SULFATE TAB 325 MG (65 MG ELEMENTAL FE) 325 MG: 325 (65 FE) TAB at 09:45

## 2018-10-27 RX ADMIN — ATORVASTATIN CALCIUM 10 MG: 10 TABLET, FILM COATED ORAL at 09:45

## 2018-10-27 RX ADMIN — PREGABALIN 200 MG: 100 CAPSULE ORAL at 09:41

## 2018-10-27 RX ADMIN — OXYCODONE HYDROCHLORIDE 10 MG: 10 TABLET, FILM COATED, EXTENDED RELEASE ORAL at 23:09

## 2018-10-27 RX ADMIN — CILOSTAZOL 100 MG: 100 TABLET ORAL at 09:41

## 2018-10-27 RX ADMIN — SPIRONOLACTONE 50 MG: 25 TABLET ORAL at 09:45

## 2018-10-27 RX ADMIN — MEROPENEM 1 G: 1 INJECTION, POWDER, FOR SOLUTION INTRAVENOUS at 03:26

## 2018-10-27 RX ADMIN — Medication 10 ML: at 09:51

## 2018-10-27 RX ADMIN — ASPIRIN 81 MG CHEWABLE TABLET 81 MG: 81 TABLET CHEWABLE at 09:41

## 2018-10-27 RX ADMIN — INSULIN LISPRO 3 UNITS: 100 INJECTION, SOLUTION INTRAVENOUS; SUBCUTANEOUS at 17:31

## 2018-10-27 RX ADMIN — Medication 10 ML: at 23:11

## 2018-10-27 RX ADMIN — OXYCODONE HYDROCHLORIDE 10 MG: 10 TABLET, FILM COATED, EXTENDED RELEASE ORAL at 15:38

## 2018-10-27 RX ADMIN — PREGABALIN 200 MG: 100 CAPSULE ORAL at 23:09

## 2018-10-27 RX ADMIN — INSULIN GLARGINE 15 UNITS: 100 INJECTION, SOLUTION SUBCUTANEOUS at 23:14

## 2018-10-27 RX ADMIN — DULOXETINE HYDROCHLORIDE 120 MG: 60 CAPSULE, DELAYED RELEASE ORAL at 09:45

## 2018-10-27 RX ADMIN — INSULIN GLARGINE 15 UNITS: 100 INJECTION, SOLUTION SUBCUTANEOUS at 09:45

## 2018-10-27 ASSESSMENT — PAIN DESCRIPTION - DESCRIPTORS
DESCRIPTORS: ACHING;BURNING;CONSTANT
DESCRIPTORS: ACHING;BURNING

## 2018-10-27 ASSESSMENT — PAIN DESCRIPTION - ONSET: ONSET: GRADUAL

## 2018-10-27 ASSESSMENT — PAIN DESCRIPTION - ORIENTATION
ORIENTATION: RIGHT
ORIENTATION: RIGHT;MID

## 2018-10-27 ASSESSMENT — PAIN SCALES - GENERAL
PAINLEVEL_OUTOF10: 10
PAINLEVEL_OUTOF10: 6
PAINLEVEL_OUTOF10: 8
PAINLEVEL_OUTOF10: 9
PAINLEVEL_OUTOF10: 10

## 2018-10-27 ASSESSMENT — PAIN DESCRIPTION - LOCATION
LOCATION: FOOT
LOCATION: BACK;FOOT

## 2018-10-27 ASSESSMENT — PAIN DESCRIPTION - PAIN TYPE
TYPE: ACUTE PAIN
TYPE: ACUTE PAIN

## 2018-10-27 ASSESSMENT — PAIN DESCRIPTION - FREQUENCY: FREQUENCY: INTERMITTENT

## 2018-10-27 ASSESSMENT — PAIN DESCRIPTION - PROGRESSION: CLINICAL_PROGRESSION: NOT CHANGED

## 2018-10-27 NOTE — PROGRESS NOTES
and labs. Excisional debridement right heel ulcer with 11 blade to dermal layer. Excisional debridement dorsal right foot ulcer, portion of eschar, to subcutaneous tissue. No pus noted. Applied dsd. Detailed discussed with patient about further treatment options. Explained risks of progression of gangrene and infection without antibiotics. Patient will need AKA as discussed with patient by vascular. Patient may benefit from oral valium etc in the future. Will follow.         Electronically signed by Mason Nunez DPM on 10/27/2018 at 9:24 AM

## 2018-10-27 NOTE — PROGRESS NOTES
Negative 2018    UROBILINOGEN 0.2 2018    BILIRUBINUR Negative 2018    BLOODU Negative 2018    GLUCOSEU Negative 2018    KETUA Negative 2018    AMORPHOUS FEW 2018     HgBA1c:    Lab Results   Component Value Date    LABA1C 10.4 10/25/2018     Urine Toxicology:  No results found for: Shellie Lesches, COCAINESCRN, LABOPIA, LABPHEN  24 Hour Urine for Protein:  No results found for: UTV, HOURSC, URINEVOLUME  24 Hour Urine for Creatinine Clearance:  No results found for: ANRBQGZ49, HOURSC, UTV   Nm Bone Scan 3 Phase    Result Date: 10/25/2018  Patient MRN: [id number] : 1955 Age:  63 years Gender: Male Order Date: 10/24/2018 9:15 PM Exam: NM BONE SCAN 3 PHASE Number of Images: views Indication:  \"Osteomyelitis, right foot osteomyelitis\". Reported right mid foot diabetic ulcer. COMPARISON: No prior comparison radionuclide bone scan. Correlation made with 2018 right foot radiographs. No more recent correlating right foot radiographs available at present. TECHNIQUE:  41.6 millicuries technetium 17D MDP administered IV, with immediate vascular flow and vascular pool imaging obtained over bilateral foot/ankle regions, with subsequent 4-hour delayed scanning performed over essentially entire skeleton. FINDINGS: Markedly abnormally and asymmetrically increased generalized activity present at right foot and to lesser degree right ankle and distal calf, on immediate vascular flow imaging. Asymmetrically and abnormally increased generalized activity in particular at right midfoot to forefoot on immediate vascular pool imaging, more focally increased at right mid foot. On subsequent delayed imaging, markedly abnormally increased activity present at right mid foot. Focal increased activity also evident at anterior left forefoot medially, possibly at first or second digit, on immediate vascular flow, immediate vascular pool, and subsequent delayed scanning.  Mildly increased

## 2018-10-28 LAB
ALBUMIN SERPL-MCNC: 2.9 G/DL (ref 3.5–5.2)
ALP BLD-CCNC: 105 U/L (ref 40–129)
ALT SERPL-CCNC: 20 U/L (ref 0–40)
ANION GAP SERPL CALCULATED.3IONS-SCNC: 11 MMOL/L (ref 7–16)
AST SERPL-CCNC: 14 U/L (ref 0–39)
BASOPHILS ABSOLUTE: 0.05 E9/L (ref 0–0.2)
BASOPHILS RELATIVE PERCENT: 0.6 % (ref 0–2)
BILIRUB SERPL-MCNC: 0.3 MG/DL (ref 0–1.2)
BUN BLDV-MCNC: 15 MG/DL (ref 8–23)
CALCIUM SERPL-MCNC: 8.5 MG/DL (ref 8.6–10.2)
CHLORIDE BLD-SCNC: 99 MMOL/L (ref 98–107)
CO2: 30 MMOL/L (ref 22–29)
CREAT SERPL-MCNC: 1.7 MG/DL (ref 0.7–1.2)
EOSINOPHILS ABSOLUTE: 0.3 E9/L (ref 0.05–0.5)
EOSINOPHILS RELATIVE PERCENT: 3.7 % (ref 0–6)
GFR AFRICAN AMERICAN: 50
GFR NON-AFRICAN AMERICAN: 50 ML/MIN/1.73
GLUCOSE BLD-MCNC: 132 MG/DL (ref 74–109)
HCT VFR BLD CALC: 26.8 % (ref 37–54)
HEMOGLOBIN: 8.1 G/DL (ref 12.5–16.5)
IMMATURE GRANULOCYTES #: 0.05 E9/L
IMMATURE GRANULOCYTES %: 0.6 % (ref 0–5)
LYMPHOCYTES ABSOLUTE: 2.33 E9/L (ref 1.5–4)
LYMPHOCYTES RELATIVE PERCENT: 28.5 % (ref 20–42)
MCH RBC QN AUTO: 21.4 PG (ref 26–35)
MCHC RBC AUTO-ENTMCNC: 30.2 % (ref 32–34.5)
MCV RBC AUTO: 70.9 FL (ref 80–99.9)
METER GLUCOSE: 142 MG/DL (ref 70–110)
METER GLUCOSE: 151 MG/DL (ref 70–110)
METER GLUCOSE: 188 MG/DL (ref 70–110)
METER GLUCOSE: 206 MG/DL (ref 70–110)
MONOCYTES ABSOLUTE: 0.69 E9/L (ref 0.1–0.95)
MONOCYTES RELATIVE PERCENT: 8.4 % (ref 2–12)
NEUTROPHILS ABSOLUTE: 4.76 E9/L (ref 1.8–7.3)
NEUTROPHILS RELATIVE PERCENT: 58.2 % (ref 43–80)
PDW BLD-RTO: 16.9 FL (ref 11.5–15)
PLATELET # BLD: 247 E9/L (ref 130–450)
PMV BLD AUTO: 10.8 FL (ref 7–12)
POTASSIUM REFLEX MAGNESIUM: 3.8 MMOL/L (ref 3.5–5)
RBC # BLD: 3.78 E12/L (ref 3.8–5.8)
SODIUM BLD-SCNC: 140 MMOL/L (ref 132–146)
TOTAL PROTEIN: 7.2 G/DL (ref 6.4–8.3)
WBC # BLD: 8.2 E9/L (ref 4.5–11.5)

## 2018-10-28 PROCEDURE — 80053 COMPREHEN METABOLIC PANEL: CPT

## 2018-10-28 PROCEDURE — 6370000000 HC RX 637 (ALT 250 FOR IP): Performed by: INTERNAL MEDICINE

## 2018-10-28 PROCEDURE — 2580000003 HC RX 258: Performed by: INTERNAL MEDICINE

## 2018-10-28 PROCEDURE — 6360000002 HC RX W HCPCS: Performed by: INTERNAL MEDICINE

## 2018-10-28 PROCEDURE — 2700000000 HC OXYGEN THERAPY PER DAY

## 2018-10-28 PROCEDURE — 1200000000 HC SEMI PRIVATE

## 2018-10-28 PROCEDURE — 82962 GLUCOSE BLOOD TEST: CPT

## 2018-10-28 PROCEDURE — 2580000003 HC RX 258: Performed by: CLINICAL NURSE SPECIALIST

## 2018-10-28 PROCEDURE — 36415 COLL VENOUS BLD VENIPUNCTURE: CPT

## 2018-10-28 PROCEDURE — 85025 COMPLETE CBC W/AUTO DIFF WBC: CPT

## 2018-10-28 PROCEDURE — 6370000000 HC RX 637 (ALT 250 FOR IP): Performed by: FAMILY MEDICINE

## 2018-10-28 RX ORDER — SODIUM CHLORIDE 0.9 % (FLUSH) 0.9 %
10 SYRINGE (ML) INJECTION PRN
Status: DISCONTINUED | OUTPATIENT
Start: 2018-10-28 | End: 2018-10-29 | Stop reason: SDUPTHER

## 2018-10-28 RX ORDER — OXYCODONE HCL 10 MG/1
10 TABLET, FILM COATED, EXTENDED RELEASE ORAL ONCE
Status: COMPLETED | OUTPATIENT
Start: 2018-10-28 | End: 2018-10-28

## 2018-10-28 RX ORDER — LIDOCAINE HYDROCHLORIDE 10 MG/ML
5 INJECTION, SOLUTION EPIDURAL; INFILTRATION; INTRACAUDAL; PERINEURAL ONCE
Status: DISCONTINUED | OUTPATIENT
Start: 2018-10-28 | End: 2018-11-05 | Stop reason: HOSPADM

## 2018-10-28 RX ORDER — SODIUM CHLORIDE 0.9 % (FLUSH) 0.9 %
10 SYRINGE (ML) INJECTION EVERY 12 HOURS SCHEDULED
Status: DISCONTINUED | OUTPATIENT
Start: 2018-10-28 | End: 2018-10-29 | Stop reason: CLARIF

## 2018-10-28 RX ADMIN — OXYCODONE HYDROCHLORIDE 10 MG: 10 TABLET, FILM COATED, EXTENDED RELEASE ORAL at 07:03

## 2018-10-28 RX ADMIN — INSULIN LISPRO 3 UNITS: 100 INJECTION, SOLUTION INTRAVENOUS; SUBCUTANEOUS at 09:27

## 2018-10-28 RX ADMIN — OXYCODONE HYDROCHLORIDE 10 MG: 10 TABLET, FILM COATED, EXTENDED RELEASE ORAL at 04:15

## 2018-10-28 RX ADMIN — INSULIN LISPRO 3 UNITS: 100 INJECTION, SOLUTION INTRAVENOUS; SUBCUTANEOUS at 17:27

## 2018-10-28 RX ADMIN — Medication 10 ML: at 20:35

## 2018-10-28 RX ADMIN — PANTOPRAZOLE SODIUM 40 MG: 40 TABLET, DELAYED RELEASE ORAL at 06:07

## 2018-10-28 RX ADMIN — ATORVASTATIN CALCIUM 10 MG: 10 TABLET, FILM COATED ORAL at 09:26

## 2018-10-28 RX ADMIN — FERROUS SULFATE TAB 325 MG (65 MG ELEMENTAL FE) 325 MG: 325 (65 FE) TAB at 09:26

## 2018-10-28 RX ADMIN — Medication 10 ML: at 13:33

## 2018-10-28 RX ADMIN — MEROPENEM 1 G: 1 INJECTION, POWDER, FOR SOLUTION INTRAVENOUS at 20:39

## 2018-10-28 RX ADMIN — Medication 10 ML: at 20:36

## 2018-10-28 RX ADMIN — METOPROLOL TARTRATE 100 MG: 50 TABLET, FILM COATED ORAL at 20:35

## 2018-10-28 RX ADMIN — CLONIDINE HYDROCHLORIDE 0.2 MG: 0.2 TABLET ORAL at 13:33

## 2018-10-28 RX ADMIN — MEROPENEM 1 G: 1 INJECTION, POWDER, FOR SOLUTION INTRAVENOUS at 13:33

## 2018-10-28 RX ADMIN — INSULIN GLARGINE 15 UNITS: 100 INJECTION, SOLUTION SUBCUTANEOUS at 09:28

## 2018-10-28 RX ADMIN — PREGABALIN 200 MG: 100 CAPSULE ORAL at 09:27

## 2018-10-28 RX ADMIN — MEROPENEM 1 G: 1 INJECTION, POWDER, FOR SOLUTION INTRAVENOUS at 02:46

## 2018-10-28 RX ADMIN — INSULIN LISPRO 3 UNITS: 100 INJECTION, SOLUTION INTRAVENOUS; SUBCUTANEOUS at 12:29

## 2018-10-28 RX ADMIN — OXYCODONE HYDROCHLORIDE 10 MG: 10 TABLET, FILM COATED, EXTENDED RELEASE ORAL at 15:13

## 2018-10-28 RX ADMIN — PREGABALIN 200 MG: 100 CAPSULE ORAL at 13:33

## 2018-10-28 RX ADMIN — ENOXAPARIN SODIUM 40 MG: 40 INJECTION, SOLUTION INTRAVENOUS; SUBCUTANEOUS at 12:27

## 2018-10-28 RX ADMIN — DAPTOMYCIN 500 MG: 500 INJECTION, POWDER, LYOPHILIZED, FOR SOLUTION INTRAVENOUS at 15:20

## 2018-10-28 RX ADMIN — OXYCODONE HYDROCHLORIDE 10 MG: 10 TABLET, FILM COATED, EXTENDED RELEASE ORAL at 22:38

## 2018-10-28 RX ADMIN — INSULIN GLARGINE 15 UNITS: 100 INJECTION, SOLUTION SUBCUTANEOUS at 21:17

## 2018-10-28 RX ADMIN — DULOXETINE HYDROCHLORIDE 120 MG: 60 CAPSULE, DELAYED RELEASE ORAL at 09:27

## 2018-10-28 RX ADMIN — SPIRONOLACTONE 50 MG: 25 TABLET ORAL at 09:27

## 2018-10-28 RX ADMIN — CILOSTAZOL 100 MG: 100 TABLET ORAL at 09:27

## 2018-10-28 RX ADMIN — PREGABALIN 200 MG: 100 CAPSULE ORAL at 20:35

## 2018-10-28 RX ADMIN — CLONIDINE HYDROCHLORIDE 0.2 MG: 0.2 TABLET ORAL at 20:34

## 2018-10-28 RX ADMIN — ASPIRIN 81 MG CHEWABLE TABLET 81 MG: 81 TABLET CHEWABLE at 09:27

## 2018-10-28 RX ADMIN — INSULIN LISPRO 3 UNITS: 100 INJECTION, SOLUTION INTRAVENOUS; SUBCUTANEOUS at 21:21

## 2018-10-28 RX ADMIN — CILOSTAZOL 100 MG: 100 TABLET ORAL at 20:34

## 2018-10-28 ASSESSMENT — PAIN SCALES - GENERAL
PAINLEVEL_OUTOF10: 9
PAINLEVEL_OUTOF10: 0
PAINLEVEL_OUTOF10: 8
PAINLEVEL_OUTOF10: 9
PAINLEVEL_OUTOF10: 9
PAINLEVEL_OUTOF10: 10

## 2018-10-28 ASSESSMENT — PAIN DESCRIPTION - DESCRIPTORS: DESCRIPTORS: ACHING;DISCOMFORT;DULL

## 2018-10-28 ASSESSMENT — PAIN DESCRIPTION - LOCATION: LOCATION: LEG

## 2018-10-28 ASSESSMENT — PAIN DESCRIPTION - PAIN TYPE: TYPE: ACUTE PAIN

## 2018-10-28 NOTE — PROGRESS NOTES
ID Progress Note                [address] [facility] [address], [place], [address] [facility]            Phone [phone]     Fax [phone]      Brief Interval History  Rt  Foot ganagrene  Subjective:  Refusing MRI foot and IV abx    The patient is awake and alert. Tolerating medications. Reports no side effects. Afebrile. 10 ROS otherwise negative unless otherwise specified above. Objective:    Vitals:    10/28/18 0730   BP: 135/61   Pulse: 57   Resp: 17   Temp: 97.9 °F (36.6 °C)   SpO2:      VENT SETTINGS:      General Appearance:    Awake, alert , no acute distress. HEENT:    Normocephalic,PERRL,neck supple, no JVD, mucosa moist, no thrush   Lungs:     Clear to auscultation bilaterally, no wheeze , crackles   Heart:    Regular rate and rhythm, no murmur   Abdomen:     Soft, non-tender, not distended  bowel sounds present,   Extremities:   No edema,no open wound,no erythema, non  tender   Skin:   no rashes or lesions. Right foot wrapped     Labs:  Recent Labs      10/28/18   0653   WBC  8.2   RBC  3.78*   HGB  8.1*   HCT  26.8*   MCV  70.9*   MCH  21.4*   MCHC  30.2*   RDW  16.9*   PLT  247   MPV  10.8     CMP:    Lab Results   Component Value Date     10/28/2018    K 3.8 10/28/2018    CL 99 10/28/2018    CO2 30 10/28/2018    BUN 15 10/28/2018    CREATININE 1.7 10/28/2018    GFRAA 50 10/28/2018    LABGLOM 50 10/28/2018    GLUCOSE 132 10/28/2018    PROT 7.2 10/28/2018    LABALBU 2.9 10/28/2018    CALCIUM 8.5 10/28/2018    BILITOT 0.3 10/28/2018    ALKPHOS 105 10/28/2018    AST 14 10/28/2018    ALT 20 10/28/2018          Microbiology :  Recent Labs      10/26/18   1432   BC  24 Hours- no growth     Recent Labs      10/26/18   1432   BLOODCULT2  24 Hours- no growth     No results for input(s): LABURIN in the last 72 hours. No results for input(s): CULTRESP in the last 72 hours. No results for input(s): WNDABS in the last 72 hours. Radiology :  Bone scan          Impression:       1. Abnormal right midfoot multiphase scintigraphic findings consistent  with ongoing osteomyelitis here under appropriate clinical  circumstances. Clinical and possible updated radiographic correlation  recommended. 2. Abnormal medial left forefoot multiphase scintigraphic findings,  suggestive of possible ongoing osteomyelitis here also, under  appropriate clinical circumstances. Clinical and possible radiographic  correlation recommended. 3. Additional multifocal bilateral lower extremity scintigraphic  findings more likely related to degenerative type changes as noted. ASSESSMENT:  · R foot cellulitis/OM/rt 4th metatarsal acute OM (staph hemoylticus, Ecoli, Klebsiella)  · Recently treated for R foot OM (enterococcus/strep and GNRs) with Unasyn x 6 weeks from 5/24 to 7/5  · S/p bedside I&D on 8/1 - no cultures sent   · S/p I&D multiple areas R foot with debridement soft tissue including subcutaneous tissue, tendon, and plantar fascia 8/3  · Staph aureus bactremia - 10/24, one set, repeat from 10/25 MRSA  · bedside I&D 10/27/18     PLAN:  Daptomycin q24 hrs plus meropenem, dapto pan 1 gram q 8hr    Follow repeat blood cultures 10/26  Watch creat 1.7  Wound cx active      His foot is the source of his staph aureus bactremia most likely also explains why his ESR is high  Vascular and podiatry following  JUSTO anna in AM  Needs amputation of right foot  Will order PICC      Ralph Fan  Pt seen and examined. Above discussed agree with advanced practice nurse. Labs, cultures, and radiographs reviewed. Face to Face encounter occurred. Changes made as necessary.      Renita Brittle, MD

## 2018-10-28 NOTE — PROGRESS NOTES
10/28/2018    RBC 3.78 10/28/2018    HGB 8.1 10/28/2018    HCT 26.8 10/28/2018     10/28/2018    MCV 70.9 10/28/2018    MCH 21.4 10/28/2018    MCHC 30.2 10/28/2018    RDW 16.9 10/28/2018    LYMPHOPCT 28.5 10/28/2018    MONOPCT 8.4 10/28/2018    BASOPCT 0.6 10/28/2018    MONOSABS 0.69 10/28/2018    LYMPHSABS 2.33 10/28/2018    EOSABS 0.30 10/28/2018    BASOSABS 0.05 10/28/2018     WBC:    Lab Results   Component Value Date    WBC 8.2 10/28/2018     Hemoglobin/Hematocrit:    Lab Results   Component Value Date    HGB 8.1 10/28/2018    HCT 26.8 10/28/2018     CMP:    Lab Results   Component Value Date     10/28/2018    K 3.8 10/28/2018    CL 99 10/28/2018    CO2 30 10/28/2018    BUN 15 10/28/2018    CREATININE 1.7 10/28/2018    GFRAA 50 10/28/2018    LABGLOM 50 10/28/2018    GLUCOSE 132 10/28/2018    PROT 7.2 10/28/2018    LABALBU 2.9 10/28/2018    CALCIUM 8.5 10/28/2018    BILITOT 0.3 10/28/2018    ALKPHOS 105 10/28/2018    AST 14 10/28/2018    ALT 20 10/28/2018     BMP:    Lab Results   Component Value Date     10/28/2018    K 3.8 10/28/2018    CL 99 10/28/2018    CO2 30 10/28/2018    BUN 15 10/28/2018    LABALBU 2.9 10/28/2018    CREATININE 1.7 10/28/2018    CALCIUM 8.5 10/28/2018    GFRAA 50 10/28/2018    LABGLOM 50 10/28/2018    GLUCOSE 132 10/28/2018     BUN/Creatinine:    Lab Results   Component Value Date    BUN 15 10/28/2018    CREATININE 1.7 10/28/2018     Uric Acid:  No results found for: URICACID  PT/INR:    Lab Results   Component Value Date    PROTIME 22.0 10/25/2018    INR 1.9 10/25/2018     Warfarin PT/INR:    Lab Results   Component Value Date    PROTIME 22.0 10/25/2018    INR 1.9 10/25/2018     PTT:    Lab Results   Component Value Date    APTT 61.1 07/27/2018   [APTT}  U/A:    Lab Results   Component Value Date    NITRU Negative 06/22/2018    COLORU Yellow 06/22/2018    PHUR 6.0 06/22/2018    WBCUA 1-3 05/23/2018    RBCUA 2-5 05/23/2018    BACTERIA RARE 05/23/2018    CLARITYU Clear

## 2018-10-29 ENCOUNTER — ANESTHESIA (OUTPATIENT)
Dept: CARDIAC CATH/INVASIVE PROCEDURES | Age: 63
DRG: 617 | End: 2018-10-29
Payer: MEDICARE

## 2018-10-29 ENCOUNTER — APPOINTMENT (OUTPATIENT)
Dept: CARDIAC CATH/INVASIVE PROCEDURES | Age: 63
DRG: 617 | End: 2018-10-29
Payer: MEDICARE

## 2018-10-29 ENCOUNTER — ANESTHESIA EVENT (OUTPATIENT)
Dept: CARDIAC CATH/INVASIVE PROCEDURES | Age: 63
DRG: 617 | End: 2018-10-29
Payer: MEDICARE

## 2018-10-29 VITALS
SYSTOLIC BLOOD PRESSURE: 124 MMHG | DIASTOLIC BLOOD PRESSURE: 71 MMHG | OXYGEN SATURATION: 91 % | RESPIRATION RATE: 20 BRPM

## 2018-10-29 LAB
BASOPHILS ABSOLUTE: 0.05 E9/L (ref 0–0.2)
BASOPHILS RELATIVE PERCENT: 0.6 % (ref 0–2)
EOSINOPHILS ABSOLUTE: 0.29 E9/L (ref 0.05–0.5)
EOSINOPHILS RELATIVE PERCENT: 3.2 % (ref 0–6)
HCT VFR BLD CALC: 26.4 % (ref 37–54)
HEMOGLOBIN: 7.8 G/DL (ref 12.5–16.5)
IMMATURE GRANULOCYTES #: 0.05 E9/L
IMMATURE GRANULOCYTES %: 0.6 % (ref 0–5)
LV EF: 60 %
LVEF MODALITY: NORMAL
LYMPHOCYTES ABSOLUTE: 2.56 E9/L (ref 1.5–4)
LYMPHOCYTES RELATIVE PERCENT: 28.2 % (ref 20–42)
MCH RBC QN AUTO: 21 PG (ref 26–35)
MCHC RBC AUTO-ENTMCNC: 29.5 % (ref 32–34.5)
MCV RBC AUTO: 71 FL (ref 80–99.9)
METER GLUCOSE: 126 MG/DL (ref 70–110)
METER GLUCOSE: 148 MG/DL (ref 70–110)
METER GLUCOSE: 193 MG/DL (ref 70–110)
METER GLUCOSE: 217 MG/DL (ref 70–110)
MONOCYTES ABSOLUTE: 0.68 E9/L (ref 0.1–0.95)
MONOCYTES RELATIVE PERCENT: 7.5 % (ref 2–12)
NEUTROPHILS ABSOLUTE: 5.44 E9/L (ref 1.8–7.3)
NEUTROPHILS RELATIVE PERCENT: 59.9 % (ref 43–80)
PDW BLD-RTO: 17.2 FL (ref 11.5–15)
PLATELET # BLD: 253 E9/L (ref 130–450)
PMV BLD AUTO: 11 FL (ref 7–12)
RBC # BLD: 3.72 E12/L (ref 3.8–5.8)
WBC # BLD: 9.1 E9/L (ref 4.5–11.5)

## 2018-10-29 PROCEDURE — 85025 COMPLETE CBC W/AUTO DIFF WBC: CPT

## 2018-10-29 PROCEDURE — 93325 DOPPLER ECHO COLOR FLOW MAPG: CPT

## 2018-10-29 PROCEDURE — 2580000003 HC RX 258

## 2018-10-29 PROCEDURE — 93312 ECHO TRANSESOPHAGEAL: CPT

## 2018-10-29 PROCEDURE — 6360000002 HC RX W HCPCS: Performed by: INTERNAL MEDICINE

## 2018-10-29 PROCEDURE — 93321 DOPPLER ECHO F-UP/LMTD STD: CPT

## 2018-10-29 PROCEDURE — 3700000000 HC ANESTHESIA ATTENDED CARE

## 2018-10-29 PROCEDURE — 2580000003 HC RX 258: Performed by: INTERNAL MEDICINE

## 2018-10-29 PROCEDURE — 6360000002 HC RX W HCPCS

## 2018-10-29 PROCEDURE — 82962 GLUCOSE BLOOD TEST: CPT

## 2018-10-29 PROCEDURE — 1200000000 HC SEMI PRIVATE

## 2018-10-29 PROCEDURE — B246ZZ4 ULTRASONOGRAPHY OF RIGHT AND LEFT HEART, TRANSESOPHAGEAL: ICD-10-PCS | Performed by: INTERNAL MEDICINE

## 2018-10-29 PROCEDURE — 6370000000 HC RX 637 (ALT 250 FOR IP): Performed by: INTERNAL MEDICINE

## 2018-10-29 PROCEDURE — 36415 COLL VENOUS BLD VENIPUNCTURE: CPT

## 2018-10-29 PROCEDURE — 2709999900 HC NON-CHARGEABLE SUPPLY

## 2018-10-29 PROCEDURE — 2580000003 HC RX 258: Performed by: CLINICAL NURSE SPECIALIST

## 2018-10-29 RX ORDER — LIDOCAINE HYDROCHLORIDE 10 MG/ML
5 INJECTION, SOLUTION EPIDURAL; INFILTRATION; INTRACAUDAL; PERINEURAL ONCE
Status: DISCONTINUED | OUTPATIENT
Start: 2018-10-29 | End: 2018-11-05 | Stop reason: HOSPADM

## 2018-10-29 RX ORDER — PROPOFOL 10 MG/ML
INJECTION, EMULSION INTRAVENOUS PRN
Status: DISCONTINUED | OUTPATIENT
Start: 2018-10-29 | End: 2018-10-29 | Stop reason: SDUPTHER

## 2018-10-29 RX ORDER — SODIUM CHLORIDE 0.9 % (FLUSH) 0.9 %
10 SYRINGE (ML) INJECTION EVERY 12 HOURS SCHEDULED
Status: DISCONTINUED | OUTPATIENT
Start: 2018-10-29 | End: 2018-11-05 | Stop reason: HOSPADM

## 2018-10-29 RX ORDER — SODIUM CHLORIDE 9 MG/ML
INJECTION, SOLUTION INTRAVENOUS CONTINUOUS PRN
Status: DISCONTINUED | OUTPATIENT
Start: 2018-10-29 | End: 2018-10-29 | Stop reason: SDUPTHER

## 2018-10-29 RX ORDER — SODIUM CHLORIDE 0.9 % (FLUSH) 0.9 %
10 SYRINGE (ML) INJECTION PRN
Status: DISCONTINUED | OUTPATIENT
Start: 2018-10-29 | End: 2018-11-05 | Stop reason: HOSPADM

## 2018-10-29 RX ADMIN — OXYCODONE HYDROCHLORIDE 10 MG: 10 TABLET, FILM COATED, EXTENDED RELEASE ORAL at 06:32

## 2018-10-29 RX ADMIN — PREGABALIN 200 MG: 100 CAPSULE ORAL at 21:06

## 2018-10-29 RX ADMIN — INSULIN GLARGINE 15 UNITS: 100 INJECTION, SOLUTION SUBCUTANEOUS at 21:37

## 2018-10-29 RX ADMIN — MEROPENEM 1 G: 1 INJECTION, POWDER, FOR SOLUTION INTRAVENOUS at 06:32

## 2018-10-29 RX ADMIN — CLONIDINE HYDROCHLORIDE 0.2 MG: 0.2 TABLET ORAL at 21:06

## 2018-10-29 RX ADMIN — METOPROLOL TARTRATE 100 MG: 50 TABLET, FILM COATED ORAL at 21:06

## 2018-10-29 RX ADMIN — MEROPENEM 1 G: 1 INJECTION, POWDER, FOR SOLUTION INTRAVENOUS at 12:18

## 2018-10-29 RX ADMIN — CILOSTAZOL 100 MG: 100 TABLET ORAL at 21:06

## 2018-10-29 RX ADMIN — OXYCODONE HYDROCHLORIDE 10 MG: 10 TABLET, FILM COATED, EXTENDED RELEASE ORAL at 23:17

## 2018-10-29 RX ADMIN — Medication 10 ML: at 21:07

## 2018-10-29 RX ADMIN — MEROPENEM 1 G: 1 INJECTION, POWDER, FOR SOLUTION INTRAVENOUS at 21:06

## 2018-10-29 RX ADMIN — PROPOFOL 20 MG: 10 INJECTION, EMULSION INTRAVENOUS at 11:11

## 2018-10-29 RX ADMIN — OXYCODONE HYDROCHLORIDE 10 MG: 10 TABLET, FILM COATED, EXTENDED RELEASE ORAL at 14:31

## 2018-10-29 RX ADMIN — PROPOFOL 100 MG: 10 INJECTION, EMULSION INTRAVENOUS at 11:09

## 2018-10-29 RX ADMIN — Medication 10 ML: at 06:32

## 2018-10-29 RX ADMIN — Medication 10 ML: at 10:45

## 2018-10-29 RX ADMIN — PANTOPRAZOLE SODIUM 40 MG: 40 TABLET, DELAYED RELEASE ORAL at 06:32

## 2018-10-29 RX ADMIN — INSULIN LISPRO 3 UNITS: 100 INJECTION, SOLUTION INTRAVENOUS; SUBCUTANEOUS at 18:18

## 2018-10-29 RX ADMIN — PREGABALIN 200 MG: 100 CAPSULE ORAL at 14:31

## 2018-10-29 RX ADMIN — DAPTOMYCIN 500 MG: 500 INJECTION, POWDER, LYOPHILIZED, FOR SOLUTION INTRAVENOUS at 13:21

## 2018-10-29 RX ADMIN — SODIUM CHLORIDE: 9 INJECTION, SOLUTION INTRAVENOUS at 11:06

## 2018-10-29 RX ADMIN — INSULIN LISPRO 3 UNITS: 100 INJECTION, SOLUTION INTRAVENOUS; SUBCUTANEOUS at 21:36

## 2018-10-29 RX ADMIN — Medication 10 ML: at 10:44

## 2018-10-29 ASSESSMENT — PAIN DESCRIPTION - DESCRIPTORS
DESCRIPTORS: ACHING;CONSTANT;DISCOMFORT
DESCRIPTORS: ACHING;CONSTANT;DISCOMFORT

## 2018-10-29 ASSESSMENT — PAIN SCALES - GENERAL
PAINLEVEL_OUTOF10: 0
PAINLEVEL_OUTOF10: 10
PAINLEVEL_OUTOF10: 10
PAINLEVEL_OUTOF10: 0
PAINLEVEL_OUTOF10: 8
PAINLEVEL_OUTOF10: 10

## 2018-10-29 ASSESSMENT — LIFESTYLE VARIABLES: SMOKING_STATUS: 1

## 2018-10-29 ASSESSMENT — PAIN DESCRIPTION - PAIN TYPE
TYPE: ACUTE PAIN
TYPE: ACUTE PAIN

## 2018-10-29 ASSESSMENT — PAIN DESCRIPTION - LOCATION
LOCATION: FOOT;BACK
LOCATION: FOOT

## 2018-10-29 ASSESSMENT — PAIN DESCRIPTION - FREQUENCY: FREQUENCY: INTERMITTENT

## 2018-10-29 ASSESSMENT — PAIN DESCRIPTION - ORIENTATION
ORIENTATION: RIGHT
ORIENTATION: RIGHT

## 2018-10-29 ASSESSMENT — ENCOUNTER SYMPTOMS: SHORTNESS OF BREATH: 0

## 2018-10-29 NOTE — PROGRESS NOTES
hyperglycemia ([facility] [address]    HLD (hyperlipidemia)    History of DVT (deep vein thrombosis)    Osteomyelitis (HCC)    Chronic ulcer of right foot with fat layer exposed ([facility] [address]    Staphylococcus aureus bacteremia without sepsis  Resolved Problems:    Diabetic foot infection ([facility] [address]        PLAN:    Recurrent Diabetic Foot Ulcer with Midfoot Osteomyelitis   -- Podiatry feels that may need amputation at this time and have consulted Vascular Surgery  -- Beginning to accept need for Amputation   -- Discussed with Vascular (Dr Farzana Mixon) - Patient NOT agreeable. Educated patient about further course if non surgical options were opted and he got angry. Discussed    -- Infectious disease following -- Dapto / Juvenal Byrne ( Currently accepting)   -- Repeat blood Cultures underway  -- Negative JUSTO evaluation for Endocarditis  -- suspected source foot at this time   -- PICC    Diabetes Mellitus Type II with Vascular Complications  -- Blood Sugar adequately controlled  -- Continue regimen     Chronic Kidney Disease   -- Stable     Rest chronic Medical issues remain stable at this time   -- Continue home medications       Diet: Diet NPO, After Midnight  Code Status: Full Code  PT/OT Eval Status:   Ordered  DVT Prophylaxis:   Lovenox  Recommended disposition at discharge:  -- Disposition remains to be determined base on interventions  -- Not a bed Hold for [facility] --> PT/OT orders placed    +++++++++++++++++++++++++++++++++++++++++++++++++  Dalia Patel MD   Associate Chief Hospitalist, [facility]  [facility].  +++++++++++++++++++++++++++++++++++++++++++++++++  NOTE: This report was transcribed using voice recognition software.  Every effort was made to ensure accuracy; however, inadvertent computerized transcription errors may be present.

## 2018-10-29 NOTE — ANESTHESIA PRE PROCEDURE
Department of Anesthesiology  Preprocedure Note       Name:  Yessy Moya   Age:  63 y.o.  :  1955                                          MRN:  [id number]         Date:  10/29/2018      Dr Jose Casper   Procedure: Benjamín Escalante JUSTO    Medications prior to admission:   Prior to Admission medications    Medication Sig Start Date End Date Taking? Authorizing Provider   cloNIDine (CATAPRES) 0.2 MG tablet Take 1 tablet by mouth 3 times daily Hold if sbp<140 18  Yes Liz Watson MD   amLODIPine (NORVASC) 10 MG tablet Take 1 tablet by mouth every morning Hold if sbp <140 18  Yes Devang Green MD   lisinopril (PRINIVIL;ZESTRIL) 10 MG tablet Take 1 tablet by mouth daily Hold if sbp<140 18  Yes Liz Watson MD   insulin glargine (LANTUS) 100 UNIT/ML injection vial Inject 15 Units into the skin 2 times daily 18  Yes Dee Ramirez MD   insulin lispro (HUMALOG) 100 UNIT/ML injection vial Inject 0-18 Units into the skin 3 times daily (with meals) 18  Yes Mary Landers MD   insulin lispro (HUMALOG) 100 UNIT/ML injection vial Inject 0-9 Units into the skin nightly 18  Yes Mary Landers MD   DULoxetine (CYMBALTA) 60 MG extended release capsule Take 120 mg by mouth every morning    Yes Historical Provider, MD   cilostazol (PLETAL) 100 MG tablet Take 100 mg by mouth 2 times daily   Yes Historical Provider, MD   tiZANidine (ZANAFLEX) 4 MG tablet Take 4 mg by mouth every 8 hours as needed    Yes Historical Provider, MD   LORazepam (ATIVAN) 1 MG tablet Take 1 mg by mouth nightly as needed for Anxiety. .   Yes Historical Provider, MD   pregabalin (LYRICA) 100 MG capsule Take 200 mg by mouth 3 times daily. .   Yes Historical Provider, MD   oxyCODONE (OXYCONTIN) 10 MG extended release tablet Take 10 mg by mouth every 8 hours.  .   Yes Historical Provider, MD   spironolactone (ALDACTONE) 50 MG tablet Take 50 mg by mouth every morning    Yes Historical Provider, MD   atorvastatin (LIPITOR) 10 MG tablet Take 10 mg by chewable tablet 81 mg  81 mg Oral Daily Yudi Atlanta, DO   81 mg at 10/28/18 4475    amLODIPine (NORVASC) tablet 10 mg  10 mg Oral QAM Yudi Atlanta, DO   10 mg at 10/26/18 7710    atorvastatin (LIPITOR) tablet 10 mg  10 mg Oral Daily Yudi Jl, DO   10 mg at 10/28/18 0573    cilostazol (PLETAL) tablet 100 mg  100 mg Oral BID Yudi Jl, DO   100 mg at 10/28/18 2034    cloNIDine (CATAPRES) tablet 0.2 mg  0.2 mg Oral TID Yudi Atlanta, DO   0.2 mg at 10/28/18 2034    DULoxetine (CYMBALTA) extended release capsule 120 mg  120 mg Oral QAM Yudi Jl, DO   120 mg at 10/28/18 6060    pantoprazole (PROTONIX) tablet 40 mg  40 mg Oral QAM AC Sai Ezzard An, DO   40 mg at 10/29/18 5891    insulin glargine (LANTUS) injection vial 15 Units  15 Units Subcutaneous BID Yudi Atlanta, DO   15 Units at 10/28/18 2117    lisinopril (PRINIVIL;ZESTRIL) tablet 10 mg  10 mg Oral Daily Yudi Atlanta, DO   10 mg at 10/26/18 4133    LORazepam (ATIVAN) tablet 1 mg  1 mg Oral Nightly PRN Yudi Atlanta, DO        metoprolol tartrate (LOPRESSOR) tablet 100 mg  100 mg Oral BID Yudi Jl, DO   100 mg at 10/28/18 2035    oxyCODONE (OXYCONTIN) extended release tablet 10 mg  10 mg Oral Q8H Sai Ezzard An, DO   10 mg at 10/29/18 0892    pregabalin (LYRICA) capsule 200 mg  200 mg Oral TID Yudi Atlanta, DO   200 mg at 10/28/18 2035    spironolactone (ALDACTONE) tablet 50 mg  50 mg Oral QAM Yudi Jl, DO   50 mg at 10/28/18 5973    tiZANidine (ZANAFLEX) tablet 4 mg  4 mg Oral Q8H PRN Yudi Jl, DO        sodium chloride flush 0.9 % injection 10 mL  10 mL Intravenous 2 times per day Yudi Atlanta, DO   10 mL at 10/28/18 2036    magnesium hydroxide (MILK OF MAGNESIA) 400 MG/5ML suspension 30 mL  30 mL Oral Daily PRN Yudi Jl, DO        ondansetron [facility]) injection 4 mg  4 mg Intravenous Q6H PRN Yudi Atlanta, DO        glucose (GLUTOSE) 40 % oral gel 15 g  15 g Oral PRN Yudi Jl, DO 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 ([facility] 5/22/2018    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 ([facility] 5/21/2018    Diabetes mellitus ([facility]     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 2 diabetes mellitus, with fat layer exposed ([facility] 5/22/2018    DVT, lower extremity (HCC)     right leg     Hyperlipidemia     Hypertension     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[facility]     PVD (peripheral vascular disease) ([facility]        Past Surgical History:        Procedure Laterality Date    FEMORAL BYPASS      Right - [place], [address] INFEC,1 BURSA Right 5/24/2018    RIGHT FOOT INCISION AND DRAINAGE WITH PARTIAL BONE RESECTION performed by Kilo Berman DPM at [facility] OFFICE/OUTPT [facility] Right 8/3/2018    INCISION AND DRAINAGE MULTIPLE AREAS RIGHT FOOT WITH DEBRIDEMENT SOFT TISSUE performed by Kilo Berman DPM at [place] [address] Right     leg        Social History:    Social History   Substance Use Topics    Smoking status: Current Every Day Smoker     Packs/day: 0.50     Years: 7.00    Smokeless tobacco: Never Used      Comment: 5-7 a day     Alcohol use No                                Ready to quit: Not Answered  Counseling given: Not Answered      Vital Signs (Current):   Vitals:    10/28/18 1330 10/28/18 1543 10/29/18 0128 10/29/18 0430   BP: (!) 144/82 138/60 118/60 126/62   Pulse: 77 67 62 65   Resp:  18 16 17   Temp:  35.8 °C (96.5 °F) 36.2 °C (97.2 °F) 36.4 °C (97.5 °F)   TempSrc:  Temporal Temporal Temporal   SpO2:  94% 92%    Weight:       Height:                                                  BP Readings from Last 3 Encounters:   10/29/18 126/62   10/02/18 120/70   09/12/18 110/72       NPO Status:  Solid/liquid 2300 10/28/2018                                                                               BMI:   Wt Readings from Last 3 Encounters:   10/25/18 (!) 326 lb 6.4 oz OM Abdominal:   (+) obese,         Vascular:   + PVD, aortic or cerebral (Hx PVD), DVT (>5 years ago per pt), .        ROS comment: Hx femoral bypass   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 . Anesthesia Plan      MAC     ASA 4     (#22 RUE   Discussed lmac/twilight anesthesia with general anesthesia as backup method. Questions encouraged and answered. )  Induction: intravenous. Anesthetic plan and risks discussed with patient. Use of blood products discussed with patient whom did not consent to blood products.                    Harvey Spears RN   10/29/2018

## 2018-10-29 NOTE — PROGRESS NOTES
OT SESSION ATTEMPT     Date:10/29/2018  Patient Name: Laverne An  MRN: [id number]  : 1955  Room: [address] [place], [place] [address]     Attempted OT session this date:    [] unavailable due to other medical staff currently with pt   [] on hold, await MRI/ neurosurgical recommendations. [] on hold per nursing staff secondary to lab / radiology results    [] declined Occupational Therapy  this date due to ___. Benefits of participation in therapy reviewed with pt.    [] off unit   [x] Other: await surgical plan    Will reattempt OT eval at a later time.     [place], [place] [address]

## 2018-10-30 LAB
BASOPHILS ABSOLUTE: 0.06 E9/L (ref 0–0.2)
BASOPHILS RELATIVE PERCENT: 0.6 % (ref 0–2)
BLOOD CULTURE, ROUTINE: NORMAL
CULTURE, BLOOD 2: NORMAL
CULTURE, BLOOD 2: NORMAL
EOSINOPHILS ABSOLUTE: 0.32 E9/L (ref 0.05–0.5)
EOSINOPHILS RELATIVE PERCENT: 3.1 % (ref 0–6)
HCT VFR BLD CALC: 26.1 % (ref 37–54)
HEMOGLOBIN: 7.9 G/DL (ref 12.5–16.5)
IMMATURE GRANULOCYTES #: 0.06 E9/L
IMMATURE GRANULOCYTES %: 0.6 % (ref 0–5)
LYMPHOCYTES ABSOLUTE: 2.66 E9/L (ref 1.5–4)
LYMPHOCYTES RELATIVE PERCENT: 25.8 % (ref 20–42)
MCH RBC QN AUTO: 21.5 PG (ref 26–35)
MCHC RBC AUTO-ENTMCNC: 30.3 % (ref 32–34.5)
MCV RBC AUTO: 70.9 FL (ref 80–99.9)
METER GLUCOSE: 143 MG/DL (ref 70–110)
METER GLUCOSE: 206 MG/DL (ref 70–110)
METER GLUCOSE: 207 MG/DL (ref 70–110)
METER GLUCOSE: 224 MG/DL (ref 70–110)
MONOCYTES ABSOLUTE: 0.68 E9/L (ref 0.1–0.95)
MONOCYTES RELATIVE PERCENT: 6.6 % (ref 2–12)
NEUTROPHILS ABSOLUTE: 6.54 E9/L (ref 1.8–7.3)
NEUTROPHILS RELATIVE PERCENT: 63.3 % (ref 43–80)
PDW BLD-RTO: 17.3 FL (ref 11.5–15)
PLATELET # BLD: 251 E9/L (ref 130–450)
PMV BLD AUTO: 10.9 FL (ref 7–12)
RBC # BLD: 3.68 E12/L (ref 3.8–5.8)
WBC # BLD: 10.3 E9/L (ref 4.5–11.5)

## 2018-10-30 PROCEDURE — 82962 GLUCOSE BLOOD TEST: CPT

## 2018-10-30 PROCEDURE — C1751 CATH, INF, PER/CENT/MIDLINE: HCPCS

## 2018-10-30 PROCEDURE — 6360000002 HC RX W HCPCS: Performed by: INTERNAL MEDICINE

## 2018-10-30 PROCEDURE — 36569 INSJ PICC 5 YR+ W/O IMAGING: CPT

## 2018-10-30 PROCEDURE — 2580000003 HC RX 258: Performed by: INTERNAL MEDICINE

## 2018-10-30 PROCEDURE — 6370000000 HC RX 637 (ALT 250 FOR IP): Performed by: FAMILY MEDICINE

## 2018-10-30 PROCEDURE — 02HV33Z INSERTION OF INFUSION DEVICE INTO SUPERIOR VENA CAVA, PERCUTANEOUS APPROACH: ICD-10-PCS | Performed by: FAMILY MEDICINE

## 2018-10-30 PROCEDURE — 1200000000 HC SEMI PRIVATE

## 2018-10-30 PROCEDURE — 6370000000 HC RX 637 (ALT 250 FOR IP): Performed by: INTERNAL MEDICINE

## 2018-10-30 PROCEDURE — 36415 COLL VENOUS BLD VENIPUNCTURE: CPT

## 2018-10-30 PROCEDURE — 85025 COMPLETE CBC W/AUTO DIFF WBC: CPT

## 2018-10-30 PROCEDURE — 99232 SBSQ HOSP IP/OBS MODERATE 35: CPT | Performed by: SURGERY

## 2018-10-30 RX ADMIN — PANTOPRAZOLE SODIUM 40 MG: 40 TABLET, DELAYED RELEASE ORAL at 06:36

## 2018-10-30 RX ADMIN — DULOXETINE HYDROCHLORIDE 120 MG: 60 CAPSULE, DELAYED RELEASE ORAL at 09:30

## 2018-10-30 RX ADMIN — LISINOPRIL 10 MG: 10 TABLET ORAL at 09:30

## 2018-10-30 RX ADMIN — MEROPENEM 1 G: 1 INJECTION, POWDER, FOR SOLUTION INTRAVENOUS at 15:00

## 2018-10-30 RX ADMIN — AMLODIPINE BESYLATE 10 MG: 10 TABLET ORAL at 09:31

## 2018-10-30 RX ADMIN — PREGABALIN 200 MG: 100 CAPSULE ORAL at 14:59

## 2018-10-30 RX ADMIN — FERROUS SULFATE TAB 325 MG (65 MG ELEMENTAL FE) 325 MG: 325 (65 FE) TAB at 16:55

## 2018-10-30 RX ADMIN — INSULIN LISPRO 3 UNITS: 100 INJECTION, SOLUTION INTRAVENOUS; SUBCUTANEOUS at 13:36

## 2018-10-30 RX ADMIN — MEROPENEM 1 G: 1 INJECTION, POWDER, FOR SOLUTION INTRAVENOUS at 05:28

## 2018-10-30 RX ADMIN — OXYCODONE HYDROCHLORIDE 10 MG: 10 TABLET, FILM COATED, EXTENDED RELEASE ORAL at 06:35

## 2018-10-30 RX ADMIN — PREGABALIN 200 MG: 100 CAPSULE ORAL at 09:29

## 2018-10-30 RX ADMIN — CILOSTAZOL 100 MG: 100 TABLET ORAL at 20:31

## 2018-10-30 RX ADMIN — ATORVASTATIN CALCIUM 10 MG: 10 TABLET, FILM COATED ORAL at 09:31

## 2018-10-30 RX ADMIN — OXYCODONE HYDROCHLORIDE 10 MG: 10 TABLET, FILM COATED, EXTENDED RELEASE ORAL at 22:53

## 2018-10-30 RX ADMIN — INSULIN LISPRO 3 UNITS: 100 INJECTION, SOLUTION INTRAVENOUS; SUBCUTANEOUS at 20:31

## 2018-10-30 RX ADMIN — METOPROLOL TARTRATE 100 MG: 50 TABLET, FILM COATED ORAL at 20:30

## 2018-10-30 RX ADMIN — Medication 10 ML: at 09:32

## 2018-10-30 RX ADMIN — INSULIN GLARGINE 15 UNITS: 100 INJECTION, SOLUTION SUBCUTANEOUS at 20:31

## 2018-10-30 RX ADMIN — METOPROLOL TARTRATE 100 MG: 50 TABLET, FILM COATED ORAL at 09:29

## 2018-10-30 RX ADMIN — INSULIN GLARGINE 15 UNITS: 100 INJECTION, SOLUTION SUBCUTANEOUS at 09:30

## 2018-10-30 RX ADMIN — OXYCODONE HYDROCHLORIDE 10 MG: 10 TABLET, FILM COATED, EXTENDED RELEASE ORAL at 15:00

## 2018-10-30 RX ADMIN — INSULIN LISPRO 6 UNITS: 100 INJECTION, SOLUTION INTRAVENOUS; SUBCUTANEOUS at 09:31

## 2018-10-30 RX ADMIN — MEROPENEM 1 G: 1 INJECTION, POWDER, FOR SOLUTION INTRAVENOUS at 20:30

## 2018-10-30 RX ADMIN — INSULIN LISPRO 6 UNITS: 100 INJECTION, SOLUTION INTRAVENOUS; SUBCUTANEOUS at 16:59

## 2018-10-30 RX ADMIN — Medication 10 ML: at 20:34

## 2018-10-30 RX ADMIN — CLONIDINE HYDROCHLORIDE 0.2 MG: 0.2 TABLET ORAL at 20:43

## 2018-10-30 RX ADMIN — DAPTOMYCIN 500 MG: 500 INJECTION, POWDER, LYOPHILIZED, FOR SOLUTION INTRAVENOUS at 15:57

## 2018-10-30 RX ADMIN — ASPIRIN 81 MG CHEWABLE TABLET 81 MG: 81 TABLET CHEWABLE at 09:29

## 2018-10-30 RX ADMIN — PREGABALIN 200 MG: 100 CAPSULE ORAL at 20:30

## 2018-10-30 RX ADMIN — ENOXAPARIN SODIUM 40 MG: 40 INJECTION, SOLUTION INTRAVENOUS; SUBCUTANEOUS at 09:29

## 2018-10-30 RX ADMIN — SPIRONOLACTONE 50 MG: 25 TABLET ORAL at 09:29

## 2018-10-30 RX ADMIN — CILOSTAZOL 100 MG: 100 TABLET ORAL at 09:31

## 2018-10-30 RX ADMIN — Medication 10 ML: at 05:28

## 2018-10-30 ASSESSMENT — PAIN DESCRIPTION - DESCRIPTORS
DESCRIPTORS: ACHING;CONSTANT;DISCOMFORT
DESCRIPTORS: ACHING;CONSTANT;DISCOMFORT
DESCRIPTORS: ACHING;CONSTANT

## 2018-10-30 ASSESSMENT — PAIN SCALES - GENERAL
PAINLEVEL_OUTOF10: 7
PAINLEVEL_OUTOF10: 10
PAINLEVEL_OUTOF10: 9
PAINLEVEL_OUTOF10: 8
PAINLEVEL_OUTOF10: 10

## 2018-10-30 ASSESSMENT — PAIN DESCRIPTION - PAIN TYPE
TYPE: ACUTE PAIN

## 2018-10-30 ASSESSMENT — PAIN DESCRIPTION - ORIENTATION
ORIENTATION: RIGHT
ORIENTATION: RIGHT

## 2018-10-30 ASSESSMENT — PAIN DESCRIPTION - LOCATION
LOCATION: BACK;FOOT
LOCATION: BACK
LOCATION: FOOT

## 2018-10-30 NOTE — PROGRESS NOTES
10/30/2018    RBC 3.68 10/30/2018    HGB 7.9 10/30/2018    HCT 26.1 10/30/2018    MCV 70.9 10/30/2018    MCH 21.5 10/30/2018    MCHC 30.3 10/30/2018    RDW 17.3 10/30/2018     10/30/2018    MPV 10.9 10/30/2018     CBC with Differential:    Lab Results   Component Value Date    WBC 10.3 10/30/2018    RBC 3.68 10/30/2018    HGB 7.9 10/30/2018    HCT 26.1 10/30/2018     10/30/2018    MCV 70.9 10/30/2018    MCH 21.5 10/30/2018    MCHC 30.3 10/30/2018    RDW 17.3 10/30/2018    LYMPHOPCT 25.8 10/30/2018    MONOPCT 6.6 10/30/2018    BASOPCT 0.6 10/30/2018    MONOSABS 0.68 10/30/2018    LYMPHSABS 2.66 10/30/2018    EOSABS 0.32 10/30/2018    BASOSABS 0.06 10/30/2018     WBC:    Lab Results   Component Value Date    WBC 10.3 10/30/2018     Hemoglobin/Hematocrit:    Lab Results   Component Value Date    HGB 7.9 10/30/2018    HCT 26.1 10/30/2018     CMP:    Lab Results   Component Value Date     10/28/2018    K 3.8 10/28/2018    CL 99 10/28/2018    CO2 30 10/28/2018    BUN 15 10/28/2018    CREATININE 1.7 10/28/2018    GFRAA 50 10/28/2018    LABGLOM 50 10/28/2018    GLUCOSE 132 10/28/2018    PROT 7.2 10/28/2018    LABALBU 2.9 10/28/2018    CALCIUM 8.5 10/28/2018    BILITOT 0.3 10/28/2018    ALKPHOS 105 10/28/2018    AST 14 10/28/2018    ALT 20 10/28/2018     BMP:    Lab Results   Component Value Date     10/28/2018    K 3.8 10/28/2018    CL 99 10/28/2018    CO2 30 10/28/2018    BUN 15 10/28/2018    LABALBU 2.9 10/28/2018    CREATININE 1.7 10/28/2018    CALCIUM 8.5 10/28/2018    GFRAA 50 10/28/2018    LABGLOM 50 10/28/2018    GLUCOSE 132 10/28/2018     BUN/Creatinine:    Lab Results   Component Value Date    BUN 15 10/28/2018    CREATININE 1.7 10/28/2018     Uric Acid:  No results found for: URICACID  PT/INR:    Lab Results   Component Value Date    PROTIME 22.0 10/25/2018    INR 1.9 10/25/2018     Warfarin PT/INR:    Lab Results   Component Value Date    PROTIME 22.0 10/25/2018    INR 1.9 10/25/2018

## 2018-10-30 NOTE — PROGRESS NOTES
Hospital Medicine Progress Note      Date of Admission: 10/24/2018  Hospital course:    -- Complicated patient with Known Diabetic Ulcers / PVD   -- Admitted by ID for Podiatry / Debridement   -- Likely Midfoot Osteo   -- + Staph Aureus Blood Cultures  -- JUSTO negative for valvular vegetation 10/29    Subjective    Patient seen and examined. Does not wish to have his foot amputated. \"Patient wants both his limbs when he dies. \"  Tolerating diet. Plan is for discharge with antibiotics. Exam:  /72   Pulse 67   Temp 98.6 °F (37 °C) (Temporal)   Resp 16   Ht 6' 2\" (1.88 m)   Wt (!) 326 lb 6.4 oz (148.1 kg)   SpO2 95%   BMI 41.91 kg/m²   General appearance: No apparent distress, appears stated age and cooperative. HEENT: Pupils equal, round, and reactive to light. Conjunctivae/corneas clear. Neck: Supple. No jugular venous distention. Trachea midline. Respiratory:  Normal respiratory effort. Clear to auscultation, bilaterally without Rales/Wheezes/Rhonchi. Cardiovascular: Regular rate and rhythm with normal S1/S2 without murmurs, rubs or gallops. Abdomen: Soft, non-tender, non-distended with normal bowel sounds. Musculoskeletal: No clubbing, cyanosis or edema bilaterally. Brisk capillary refill. 2+ lower extremity pulses (dorsalis pedis). Skin:  R LE with dressing, mildly soiled.   Neurologic: awake, alert and following commands     Medications:  Reviewed    Infusion Medications    dextrose       Scheduled Medications    lidocaine 1 % injection  5 mL Intradermal Once    sodium chloride flush  10 mL Intravenous 2 times per day    enoxaparin  40 mg Subcutaneous Daily    lidocaine 1 % injection  5 mL Intradermal Once    ferrous sulfate  325 mg Oral BID     daptomycin (CUBICIN) IVPB  500 mg Intravenous Q24H    meropenem  1 g Intravenous Q8H    aspirin  81 mg Oral Daily    amLODIPine  10 mg Oral QAM    atorvastatin  10 mg Oral Daily    cilostazol  100 mg Oral BID    cloNIDine  0.2 mg

## 2018-10-31 ENCOUNTER — ANESTHESIA EVENT (OUTPATIENT)
Dept: OPERATING ROOM | Age: 63
DRG: 617 | End: 2018-10-31
Payer: MEDICARE

## 2018-10-31 LAB
ANION GAP SERPL CALCULATED.3IONS-SCNC: 18 MMOL/L (ref 7–16)
BASOPHILS ABSOLUTE: 0.05 E9/L (ref 0–0.2)
BASOPHILS RELATIVE PERCENT: 0.5 % (ref 0–2)
BLOOD CULTURE, ROUTINE: NORMAL
BUN BLDV-MCNC: 16 MG/DL (ref 8–23)
CALCIUM SERPL-MCNC: 8.8 MG/DL (ref 8.6–10.2)
CHLORIDE BLD-SCNC: 103 MMOL/L (ref 98–107)
CO2: 21 MMOL/L (ref 22–29)
CREAT SERPL-MCNC: 1.5 MG/DL (ref 0.7–1.2)
CULTURE, BLOOD 2: NORMAL
EOSINOPHILS ABSOLUTE: 0.35 E9/L (ref 0.05–0.5)
EOSINOPHILS RELATIVE PERCENT: 3.2 % (ref 0–6)
GFR AFRICAN AMERICAN: 58
GFR NON-AFRICAN AMERICAN: 58 ML/MIN/1.73
GLUCOSE BLD-MCNC: 216 MG/DL (ref 74–109)
HCT VFR BLD CALC: 27.5 % (ref 37–54)
HEMOGLOBIN: 8.2 G/DL (ref 12.5–16.5)
IMMATURE GRANULOCYTES #: 0.05 E9/L
IMMATURE GRANULOCYTES %: 0.5 % (ref 0–5)
LYMPHOCYTES ABSOLUTE: 3.01 E9/L (ref 1.5–4)
LYMPHOCYTES RELATIVE PERCENT: 27.4 % (ref 20–42)
MCH RBC QN AUTO: 21 PG (ref 26–35)
MCHC RBC AUTO-ENTMCNC: 29.8 % (ref 32–34.5)
MCV RBC AUTO: 70.3 FL (ref 80–99.9)
METER GLUCOSE: 139 MG/DL (ref 70–110)
METER GLUCOSE: 178 MG/DL (ref 70–110)
METER GLUCOSE: 210 MG/DL (ref 70–110)
METER GLUCOSE: 222 MG/DL (ref 70–110)
METER GLUCOSE: 288 MG/DL (ref 70–110)
MONOCYTES ABSOLUTE: 0.66 E9/L (ref 0.1–0.95)
MONOCYTES RELATIVE PERCENT: 6 % (ref 2–12)
NEUTROPHILS ABSOLUTE: 6.86 E9/L (ref 1.8–7.3)
NEUTROPHILS RELATIVE PERCENT: 62.4 % (ref 43–80)
PDW BLD-RTO: 17.6 FL (ref 11.5–15)
PLATELET # BLD: 241 E9/L (ref 130–450)
PMV BLD AUTO: 11.3 FL (ref 7–12)
POTASSIUM REFLEX MAGNESIUM: 4.2 MMOL/L (ref 3.5–5)
RBC # BLD: 3.91 E12/L (ref 3.8–5.8)
SODIUM BLD-SCNC: 142 MMOL/L (ref 132–146)
WBC # BLD: 11 E9/L (ref 4.5–11.5)

## 2018-10-31 PROCEDURE — 1200000000 HC SEMI PRIVATE

## 2018-10-31 PROCEDURE — 36415 COLL VENOUS BLD VENIPUNCTURE: CPT

## 2018-10-31 PROCEDURE — 2580000003 HC RX 258: Performed by: INTERNAL MEDICINE

## 2018-10-31 PROCEDURE — 6370000000 HC RX 637 (ALT 250 FOR IP): Performed by: FAMILY MEDICINE

## 2018-10-31 PROCEDURE — 80048 BASIC METABOLIC PNL TOTAL CA: CPT

## 2018-10-31 PROCEDURE — 6360000002 HC RX W HCPCS: Performed by: INTERNAL MEDICINE

## 2018-10-31 PROCEDURE — 85025 COMPLETE CBC W/AUTO DIFF WBC: CPT

## 2018-10-31 PROCEDURE — 99231 SBSQ HOSP IP/OBS SF/LOW 25: CPT | Performed by: SURGERY

## 2018-10-31 PROCEDURE — 6370000000 HC RX 637 (ALT 250 FOR IP): Performed by: INTERNAL MEDICINE

## 2018-10-31 PROCEDURE — 82962 GLUCOSE BLOOD TEST: CPT

## 2018-10-31 RX ADMIN — Medication 10 ML: at 22:35

## 2018-10-31 RX ADMIN — ENOXAPARIN SODIUM 40 MG: 40 INJECTION, SOLUTION INTRAVENOUS; SUBCUTANEOUS at 08:27

## 2018-10-31 RX ADMIN — MEROPENEM 1 G: 1 INJECTION, POWDER, FOR SOLUTION INTRAVENOUS at 06:09

## 2018-10-31 RX ADMIN — DAPTOMYCIN 500 MG: 500 INJECTION, POWDER, LYOPHILIZED, FOR SOLUTION INTRAVENOUS at 10:43

## 2018-10-31 RX ADMIN — INSULIN LISPRO 2 UNITS: 100 INJECTION, SOLUTION INTRAVENOUS; SUBCUTANEOUS at 17:20

## 2018-10-31 RX ADMIN — INSULIN LISPRO 2 UNITS: 100 INJECTION, SOLUTION INTRAVENOUS; SUBCUTANEOUS at 13:41

## 2018-10-31 RX ADMIN — MEROPENEM 1 G: 1 INJECTION, POWDER, FOR SOLUTION INTRAVENOUS at 21:58

## 2018-10-31 RX ADMIN — INSULIN GLARGINE 15 UNITS: 100 INJECTION, SOLUTION SUBCUTANEOUS at 08:28

## 2018-10-31 RX ADMIN — Medication 10 ML: at 21:59

## 2018-10-31 RX ADMIN — OXYCODONE HYDROCHLORIDE 10 MG: 10 TABLET, FILM COATED, EXTENDED RELEASE ORAL at 21:58

## 2018-10-31 RX ADMIN — PANTOPRAZOLE SODIUM 40 MG: 40 TABLET, DELAYED RELEASE ORAL at 05:55

## 2018-10-31 RX ADMIN — AMLODIPINE BESYLATE 10 MG: 10 TABLET ORAL at 08:27

## 2018-10-31 RX ADMIN — METOPROLOL TARTRATE 100 MG: 50 TABLET, FILM COATED ORAL at 08:25

## 2018-10-31 RX ADMIN — Medication 10 ML: at 09:53

## 2018-10-31 RX ADMIN — PREGABALIN 200 MG: 100 CAPSULE ORAL at 13:40

## 2018-10-31 RX ADMIN — OXYCODONE HYDROCHLORIDE 10 MG: 10 TABLET, FILM COATED, EXTENDED RELEASE ORAL at 14:24

## 2018-10-31 RX ADMIN — PREGABALIN 200 MG: 100 CAPSULE ORAL at 21:58

## 2018-10-31 RX ADMIN — SPIRONOLACTONE 50 MG: 25 TABLET ORAL at 08:25

## 2018-10-31 RX ADMIN — PREGABALIN 200 MG: 100 CAPSULE ORAL at 08:25

## 2018-10-31 RX ADMIN — OXYCODONE HYDROCHLORIDE 10 MG: 10 TABLET, FILM COATED, EXTENDED RELEASE ORAL at 05:55

## 2018-10-31 RX ADMIN — ASPIRIN 81 MG CHEWABLE TABLET 81 MG: 81 TABLET CHEWABLE at 08:27

## 2018-10-31 RX ADMIN — INSULIN LISPRO 5 UNITS: 100 INJECTION, SOLUTION INTRAVENOUS; SUBCUTANEOUS at 23:52

## 2018-10-31 RX ADMIN — CILOSTAZOL 100 MG: 100 TABLET ORAL at 21:59

## 2018-10-31 RX ADMIN — LISINOPRIL 10 MG: 10 TABLET ORAL at 08:26

## 2018-10-31 RX ADMIN — CILOSTAZOL 100 MG: 100 TABLET ORAL at 08:26

## 2018-10-31 RX ADMIN — Medication 10 ML: at 08:27

## 2018-10-31 RX ADMIN — MEROPENEM 1 G: 1 INJECTION, POWDER, FOR SOLUTION INTRAVENOUS at 13:09

## 2018-10-31 RX ADMIN — TIZANIDINE 4 MG: 4 TABLET ORAL at 21:58

## 2018-10-31 RX ADMIN — ATORVASTATIN CALCIUM 10 MG: 10 TABLET, FILM COATED ORAL at 08:27

## 2018-10-31 RX ADMIN — INSULIN LISPRO 3 UNITS: 100 INJECTION, SOLUTION INTRAVENOUS; SUBCUTANEOUS at 13:11

## 2018-10-31 RX ADMIN — INSULIN LISPRO 6 UNITS: 100 INJECTION, SOLUTION INTRAVENOUS; SUBCUTANEOUS at 17:18

## 2018-10-31 RX ADMIN — METOPROLOL TARTRATE 100 MG: 50 TABLET, FILM COATED ORAL at 21:58

## 2018-10-31 RX ADMIN — DULOXETINE HYDROCHLORIDE 120 MG: 60 CAPSULE, DELAYED RELEASE ORAL at 08:26

## 2018-10-31 ASSESSMENT — PAIN SCALES - GENERAL
PAINLEVEL_OUTOF10: 9
PAINLEVEL_OUTOF10: 6
PAINLEVEL_OUTOF10: 10
PAINLEVEL_OUTOF10: 9
PAINLEVEL_OUTOF10: 7
PAINLEVEL_OUTOF10: 7
PAINLEVEL_OUTOF10: 9

## 2018-10-31 ASSESSMENT — PAIN DESCRIPTION - PROGRESSION: CLINICAL_PROGRESSION: NOT CHANGED

## 2018-10-31 ASSESSMENT — ENCOUNTER SYMPTOMS: SHORTNESS OF BREATH: 0

## 2018-10-31 ASSESSMENT — PAIN DESCRIPTION - LOCATION: LOCATION: FOOT;LEG

## 2018-10-31 ASSESSMENT — PAIN DESCRIPTION - ONSET: ONSET: ON-GOING

## 2018-10-31 ASSESSMENT — PAIN DESCRIPTION - PAIN TYPE
TYPE: ACUTE PAIN
TYPE: ACUTE PAIN

## 2018-10-31 ASSESSMENT — LIFESTYLE VARIABLES: SMOKING_STATUS: 1

## 2018-10-31 ASSESSMENT — PAIN DESCRIPTION - ORIENTATION: ORIENTATION: RIGHT

## 2018-10-31 ASSESSMENT — PAIN DESCRIPTION - FREQUENCY: FREQUENCY: CONTINUOUS

## 2018-10-31 ASSESSMENT — PAIN DESCRIPTION - DESCRIPTORS: DESCRIPTORS: ACHING;CONSTANT;DISCOMFORT

## 2018-10-31 NOTE — ANESTHESIA PRE PROCEDURE
(LIPITOR) 10 MG tablet Take 10 mg by mouth daily    Yes Historical Provider, MD   esomeprazole (NEXIUM) 40 MG delayed release capsule Take 40 mg by mouth every morning (before breakfast)   Yes Historical Provider, MD   aspirin 81 MG chewable tablet Take 1 tablet by mouth daily 8/8/18   Moses Stevenson MD   warfarin (COUMADIN) 10 MG tablet Take 10 mg by mouth three times a week 5 mg 4 times weekly (at night) --Sunday, Tuesday, Thursday, Saturday   10 mg 3 times weekly (taken at night)--Monday, Wednesday, Friday    Historical Provider, MD   metoprolol (LOPRESSOR) 100 MG tablet Take 100 mg by mouth 2 times daily    Historical Provider, MD   warfarin (COUMADIN) 5 MG tablet Take 5 mg by mouth four times a week 5 mg 4 times weekly (at night) --Sunday, Tuesday, Thursday, Saturday   10 mg 3 times weekly (taken at night)--Monday, Wednesday, Friday    Historical Provider, MD       Current medications:    Current Facility-Administered Medications   Medication Dose Route Frequency Provider Last Rate Last Dose    lidocaine PF 1 % injection 5 mL  5 mL Intradermal Once John Gibbs MD        sodium chloride flush 0.9 % injection 10 mL  10 mL Intravenous 2 times per day Ximena Lee MD   10 mL at 10/31/18 0827    sodium chloride flush 0.9 % injection 10 mL  10 mL Intravenous PRN John Gibbs MD   10 mL at 10/30/18 0528    enoxaparin (LOVENOX) injection 40 mg  40 mg Subcutaneous Daily Juan Kruse, DO   40 mg at 10/31/18 0827    lidocaine PF 1 % injection 5 mL  5 mL Intradermal Once SHONDA Hunter - CNS        ferrous sulfate tablet 325 mg  325 mg Oral BID WC Bhavani Sterling MD   325 mg at 10/30/18 1655    daptomycin (CUBICIN) 500 mg in sodium chloride 0.9 % 50 mL IVPB  500 mg Intravenous Q24H Brianda Sanches MD   Stopped at 10/31/18 1113    meropenem (MERREM) 1 g in sodium chloride 0.9 % 100 mL IVPB (mini-bag)  1 g Intravenous Cathi Joseph MD   Stopped at 10/31/18 0658    aspirin chewable tablet 81 mg right midfoot multiphase scintigraphic findings consistent   with ongoing osteomyelitis here under appropriate clinical   circumstances. Clinical and possible updated radiographic correlation   recommended. 2. Abnormal medial left forefoot multiphase scintigraphic findings,   suggestive of possible ongoing osteomyelitis here also, under   appropriate clinical circumstances. Clinical and possible radiographic   correlation recommended. 3. Additional multifocal bilateral lower extremity scintigraphic   findings more likely related to degenerative type changes as noted.         Lower extremity ultrasound 7/31/2018  CONCLUSION:       1. Superficial thrombophlebitis involving the left greater saphenous   vein. 2. No evidence of deep venous thrombosis involving the right lower   extremity.           Anesthesia Evaluation  Patient summary reviewed and Nursing notes reviewed no history of anesthetic complications:   Airway: Mallampati: IV     Neck ROM: full  Mouth opening: > = 3 FB Dental:          Pulmonary: breath sounds clear to auscultation  (+) pneumonia (Hx Legionnaire's disease ):  sleep apnea: on CPAP,  current smoker (last cigarette 4 days ago; pt states he smokes less than a 1/2 ppd)    (-) COPD, asthma and shortness of breath                          ROS comment: Hx rhinoplasty   Cardiovascular:    (+) hypertension:, hyperlipidemia    (-) pacemaker, past MI, dysrhythmias and  angina    ECG reviewed  Rhythm: regular  Rate: normal  Echocardiogram reviewed                  Neuro/Psych:      (-) seizures, TIA and CVA           GI/Hepatic/Renal:   (+) renal disease (Hx CKD):,      (-) GERD and liver disease       Endo/Other:    (+) DiabetesType II DM, using insulin, blood dyscrasia: anticoagulation therapy:., electrolyte abnormalities, .                   ROS comment: Hx diabetic ulcer right midfoot   Hx OM Abdominal:   (+) obese,         Vascular:   + PVD, aortic or cerebral (Hx PVD), DVT (>5 years ago per pt),

## 2018-10-31 NOTE — PROGRESS NOTES
Hospital Medicine Progress Note      Date of Admission: 10/24/2018  Hospital course:    -- Complicated patient with Known Diabetic Ulcers / PVD   -- Admitted by ID for Podiatry / Debridement   -- Likely Midfoot Osteo   -- + Staph Aureus Blood Cultures  -- JUSTO negative for valvular vegetation 10/29    Subjective    Patient seen and examined. Patient is finally agreeable with having foot amputation. Planned for Thursday. Does not voice any other complaints to me. No fevers. Exam:  /70   Pulse 64   Temp 97.1 °F (36.2 °C) (Temporal)   Resp 16   Ht 6' 2\" (1.88 m)   Wt (!) 326 lb 6.4 oz (148.1 kg)   SpO2 96%   BMI 41.91 kg/m²   General appearance: No apparent distress, appears stated age and cooperative. HEENT: Pupils equal, round, and reactive to light. Conjunctivae/corneas clear. Neck: Supple. No jugular venous distention. Trachea midline. Respiratory:  Normal respiratory effort. Clear to auscultation, bilaterally without Rales/Wheezes/Rhonchi. Cardiovascular: Regular rate and rhythm with normal S1/S2 without murmurs, rubs or gallops. Abdomen: Soft, non-tender, non-distended with normal bowel sounds. Musculoskeletal: No clubbing, cyanosis or edema bilaterally. Brisk capillary refill. 2+ lower extremity pulses (dorsalis pedis). Skin:  R LE with dressing, mildly soiled.   Neurologic: awake, alert and following commands     Medications:  Reviewed    Infusion Medications    dextrose       Scheduled Medications    lidocaine 1 % injection  5 mL Intradermal Once    sodium chloride flush  10 mL Intravenous 2 times per day    enoxaparin  40 mg Subcutaneous Daily    lidocaine 1 % injection  5 mL Intradermal Once    ferrous sulfate  325 mg Oral BID     daptomycin (CUBICIN) IVPB  500 mg Intravenous Q24H    meropenem  1 g Intravenous Q8H    aspirin  81 mg Oral Daily    amLODIPine  10 mg Oral QAM    atorvastatin  10 mg Oral Daily    cilostazol  100 mg Oral BID    cloNIDine  0.2 mg Oral TID  DULoxetine  120 mg Oral QAM    pantoprazole  40 mg Oral QAM AC    insulin glargine  15 Units Subcutaneous BID    lisinopril  10 mg Oral Daily    metoprolol  100 mg Oral BID    oxyCODONE  10 mg Oral Q8H    pregabalin  200 mg Oral TID    spironolactone  50 mg Oral QAM    sodium chloride flush  10 mL Intravenous 2 times per day    insulin lispro  0-18 Units Subcutaneous TID WC    insulin lispro  0-9 Units Subcutaneous Nightly     PRN Meds: sodium chloride flush, LORazepam, tiZANidine, magnesium hydroxide, ondansetron, glucose, dextrose, glucagon (rDNA), dextrose    I/O    Intake/Output Summary (Last 24 hours) at 10/31/18 1327  Last data filed at 10/31/18 0531   Gross per 24 hour   Intake              200 ml   Output              950 ml   Net             -750 ml       Labs:   Recent Labs      10/29/18   0717  10/30/18   0705  10/31/18   0615   WBC  9.1  10.3  11.0   HGB  7.8*  7.9*  8.2*   HCT  26.4*  26.1*  27.5*   PLT  253  251  241       No results for input(s): NA, K, CL, CO2, BUN, CREATININE, CALCIUM, PHOS in the last 72 hours. Invalid input(s): MAGNES    No results for input(s): PROT, ALB, ALKPHOS, ALT, AST, BILITOT, AMYLASE, LIPASE in the last 72 hours. No results for input(s): INR in the last 72 hours. No results for input(s): Edger Dimmer in the last 72 hours. Chronic labs:  Lab Results   Component Value Date    CHOL 108 05/23/2018    TRIG 81 05/23/2018    HDL 26 05/23/2018    LDLCALC 66 05/23/2018    TSH 1.100 10/25/2018    PSA 0.41 05/24/2018    INR 1.9 10/25/2018    LABA1C 10.4 (H) 10/25/2018       Radiology:  Imaging studies reviewed today.     ASSESSMENT:    Principal Problem:    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 2 diabetes mellitus, with fat layer exposed ([facility]  Active Problems:    HTN (hypertension)    PVD (peripheral vascular disease) (HCC)    Microcytic anemia    Stage 3 chronic kidney disease ([facility]    Morbid obesity with BMI of 40.0-44.9, adult ([facility]

## 2018-10-31 NOTE — PROGRESS NOTES
07/27/2018    K 3.8 10/28/2018    BUN 15 10/28/2018    CREATININE 1.7 (H) 10/28/2018     A/P R DFU, PVD with ulceration  · I would recommend as previous a R AKA  I reviewed the procedure with the patient and family as available. I discussed the procedure, risks, benefits, complications, and alternatives of the procedure.     Patient is willing to proceed with R AKA  · He has been cleared by cardiology previously  · Surgery is planned for Thursday afternoon    [facility]

## 2018-10-31 NOTE — PLAN OF CARE
Problem: Pain:  Goal: Pain level will decrease  Pain level will decrease   Outcome: Met This Shift    Goal: Control of acute pain  Control of acute pain   Outcome: Met This Shift    Goal: Control of chronic pain  Control of chronic pain   Outcome: Met This Shift      Problem: Risk for Impaired Skin Integrity  Goal: Tissue integrity - skin and mucous membranes  Structural intactness and normal physiological function of skin and  mucous membranes.    Outcome: Met This Shift

## 2018-11-01 ENCOUNTER — ANESTHESIA (OUTPATIENT)
Dept: OPERATING ROOM | Age: 63
DRG: 617 | End: 2018-11-01
Payer: MEDICARE

## 2018-11-01 VITALS
SYSTOLIC BLOOD PRESSURE: 140 MMHG | TEMPERATURE: 98.2 F | RESPIRATION RATE: 4 BRPM | OXYGEN SATURATION: 97 % | DIASTOLIC BLOOD PRESSURE: 66 MMHG

## 2018-11-01 LAB
ABO/RH: NORMAL
ANTIBODY SCREEN: NORMAL
BASOPHILS ABSOLUTE: 0.03 E9/L (ref 0–0.2)
BASOPHILS RELATIVE PERCENT: 0.3 % (ref 0–2)
BLOOD BANK DISPENSE STATUS: NORMAL
BLOOD BANK DISPENSE STATUS: NORMAL
BLOOD BANK PRODUCT CODE: NORMAL
BLOOD BANK PRODUCT CODE: NORMAL
BPU ID: NORMAL
BPU ID: NORMAL
DESCRIPTION BLOOD BANK: NORMAL
DESCRIPTION BLOOD BANK: NORMAL
EOSINOPHILS ABSOLUTE: 0.33 E9/L (ref 0.05–0.5)
EOSINOPHILS RELATIVE PERCENT: 3.4 % (ref 0–6)
HCT VFR BLD CALC: 26.3 % (ref 37–54)
HEMOGLOBIN: 7.8 G/DL (ref 12.5–16.5)
IMMATURE GRANULOCYTES #: 0.03 E9/L
IMMATURE GRANULOCYTES %: 0.3 % (ref 0–5)
LYMPHOCYTES ABSOLUTE: 2.69 E9/L (ref 1.5–4)
LYMPHOCYTES RELATIVE PERCENT: 27.8 % (ref 20–42)
MCH RBC QN AUTO: 21 PG (ref 26–35)
MCHC RBC AUTO-ENTMCNC: 29.7 % (ref 32–34.5)
MCV RBC AUTO: 70.7 FL (ref 80–99.9)
METER GLUCOSE: 158 MG/DL (ref 70–110)
METER GLUCOSE: 205 MG/DL (ref 70–110)
METER GLUCOSE: 237 MG/DL (ref 70–110)
MONOCYTES ABSOLUTE: 0.69 E9/L (ref 0.1–0.95)
MONOCYTES RELATIVE PERCENT: 7.1 % (ref 2–12)
NEUTROPHILS ABSOLUTE: 5.9 E9/L (ref 1.8–7.3)
NEUTROPHILS RELATIVE PERCENT: 61.1 % (ref 43–80)
PDW BLD-RTO: 17.5 FL (ref 11.5–15)
PLATELET # BLD: 227 E9/L (ref 130–450)
PMV BLD AUTO: 11.2 FL (ref 7–12)
PREALBUMIN: 9 MG/DL (ref 20–40)
RBC # BLD: 3.72 E12/L (ref 3.8–5.8)
WBC # BLD: 9.7 E9/L (ref 4.5–11.5)

## 2018-11-01 PROCEDURE — 36415 COLL VENOUS BLD VENIPUNCTURE: CPT

## 2018-11-01 PROCEDURE — 88311 DECALCIFY TISSUE: CPT

## 2018-11-01 PROCEDURE — 2500000003 HC RX 250 WO HCPCS: Performed by: NURSE ANESTHETIST, CERTIFIED REGISTERED

## 2018-11-01 PROCEDURE — 6360000002 HC RX W HCPCS: Performed by: ANESTHESIOLOGY

## 2018-11-01 PROCEDURE — 3600000003 HC SURGERY LEVEL 3 BASE: Performed by: SURGERY

## 2018-11-01 PROCEDURE — 86923 COMPATIBILITY TEST ELECTRIC: CPT

## 2018-11-01 PROCEDURE — P9016 RBC LEUKOCYTES REDUCED: HCPCS

## 2018-11-01 PROCEDURE — 3700000001 HC ADD 15 MINUTES (ANESTHESIA): Performed by: SURGERY

## 2018-11-01 PROCEDURE — 84134 ASSAY OF PREALBUMIN: CPT

## 2018-11-01 PROCEDURE — 2580000003 HC RX 258: Performed by: NURSE ANESTHETIST, CERTIFIED REGISTERED

## 2018-11-01 PROCEDURE — 85025 COMPLETE CBC W/AUTO DIFF WBC: CPT

## 2018-11-01 PROCEDURE — 2580000003 HC RX 258: Performed by: INTERNAL MEDICINE

## 2018-11-01 PROCEDURE — 86901 BLOOD TYPING SEROLOGIC RH(D): CPT

## 2018-11-01 PROCEDURE — 86900 BLOOD TYPING SEROLOGIC ABO: CPT

## 2018-11-01 PROCEDURE — 0Y6C0Z3 DETACHMENT AT RIGHT UPPER LEG, LOW, OPEN APPROACH: ICD-10-PCS | Performed by: SURGERY

## 2018-11-01 PROCEDURE — 6370000000 HC RX 637 (ALT 250 FOR IP): Performed by: FAMILY MEDICINE

## 2018-11-01 PROCEDURE — 27590 AMPUTATE LEG AT THIGH: CPT | Performed by: SURGERY

## 2018-11-01 PROCEDURE — 6360000002 HC RX W HCPCS: Performed by: STUDENT IN AN ORGANIZED HEALTH CARE EDUCATION/TRAINING PROGRAM

## 2018-11-01 PROCEDURE — 2580000003 HC RX 258: Performed by: ANESTHESIOLOGIST ASSISTANT

## 2018-11-01 PROCEDURE — 6360000002 HC RX W HCPCS: Performed by: NURSE ANESTHETIST, CERTIFIED REGISTERED

## 2018-11-01 PROCEDURE — 3700000000 HC ANESTHESIA ATTENDED CARE: Performed by: SURGERY

## 2018-11-01 PROCEDURE — 6370000000 HC RX 637 (ALT 250 FOR IP): Performed by: INTERNAL MEDICINE

## 2018-11-01 PROCEDURE — 3600000013 HC SURGERY LEVEL 3 ADDTL 15MIN: Performed by: SURGERY

## 2018-11-01 PROCEDURE — 2709999900 HC NON-CHARGEABLE SUPPLY: Performed by: SURGERY

## 2018-11-01 PROCEDURE — 1200000000 HC SEMI PRIVATE

## 2018-11-01 PROCEDURE — 2500000003 HC RX 250 WO HCPCS: Performed by: ANESTHESIOLOGIST ASSISTANT

## 2018-11-01 PROCEDURE — 88307 TISSUE EXAM BY PATHOLOGIST: CPT

## 2018-11-01 PROCEDURE — 6360000002 HC RX W HCPCS: Performed by: ANESTHESIOLOGIST ASSISTANT

## 2018-11-01 PROCEDURE — 88305 TISSUE EXAM BY PATHOLOGIST: CPT

## 2018-11-01 PROCEDURE — 6360000002 HC RX W HCPCS: Performed by: INTERNAL MEDICINE

## 2018-11-01 PROCEDURE — 6370000000 HC RX 637 (ALT 250 FOR IP): Performed by: STUDENT IN AN ORGANIZED HEALTH CARE EDUCATION/TRAINING PROGRAM

## 2018-11-01 PROCEDURE — 86850 RBC ANTIBODY SCREEN: CPT

## 2018-11-01 PROCEDURE — 82962 GLUCOSE BLOOD TEST: CPT

## 2018-11-01 PROCEDURE — 2580000003 HC RX 258: Performed by: STUDENT IN AN ORGANIZED HEALTH CARE EDUCATION/TRAINING PROGRAM

## 2018-11-01 PROCEDURE — 7100000001 HC PACU RECOVERY - ADDTL 15 MIN: Performed by: SURGERY

## 2018-11-01 PROCEDURE — 7100000000 HC PACU RECOVERY - FIRST 15 MIN: Performed by: SURGERY

## 2018-11-01 RX ORDER — SODIUM CHLORIDE 9 MG/ML
INJECTION, SOLUTION INTRAVENOUS CONTINUOUS PRN
Status: DISCONTINUED | OUTPATIENT
Start: 2018-11-01 | End: 2018-11-01 | Stop reason: SDUPTHER

## 2018-11-01 RX ORDER — PROPOFOL 10 MG/ML
INJECTION, EMULSION INTRAVENOUS PRN
Status: DISCONTINUED | OUTPATIENT
Start: 2018-11-01 | End: 2018-11-01 | Stop reason: SDUPTHER

## 2018-11-01 RX ORDER — NEOSTIGMINE METHYLSULFATE 1 MG/ML
INJECTION, SOLUTION INTRAVENOUS PRN
Status: DISCONTINUED | OUTPATIENT
Start: 2018-11-01 | End: 2018-11-01 | Stop reason: SDUPTHER

## 2018-11-01 RX ORDER — ONDANSETRON 2 MG/ML
INJECTION INTRAMUSCULAR; INTRAVENOUS PRN
Status: DISCONTINUED | OUTPATIENT
Start: 2018-11-01 | End: 2018-11-01 | Stop reason: SDUPTHER

## 2018-11-01 RX ORDER — MEPERIDINE HYDROCHLORIDE 50 MG/ML
12.5 INJECTION INTRAMUSCULAR; INTRAVENOUS; SUBCUTANEOUS EVERY 5 MIN PRN
Status: DISCONTINUED | OUTPATIENT
Start: 2018-11-01 | End: 2018-11-01 | Stop reason: HOSPADM

## 2018-11-01 RX ORDER — MORPHINE SULFATE 10 MG/ML
INJECTION, SOLUTION INTRAMUSCULAR; INTRAVENOUS PRN
Status: DISCONTINUED | OUTPATIENT
Start: 2018-11-01 | End: 2018-11-01 | Stop reason: SDUPTHER

## 2018-11-01 RX ORDER — LABETALOL HYDROCHLORIDE 5 MG/ML
5 INJECTION, SOLUTION INTRAVENOUS EVERY 10 MIN PRN
Status: DISCONTINUED | OUTPATIENT
Start: 2018-11-01 | End: 2018-11-01 | Stop reason: HOSPADM

## 2018-11-01 RX ORDER — GLYCOPYRROLATE 1 MG/5 ML
SYRINGE (ML) INTRAVENOUS PRN
Status: DISCONTINUED | OUTPATIENT
Start: 2018-11-01 | End: 2018-11-01 | Stop reason: SDUPTHER

## 2018-11-01 RX ORDER — LIDOCAINE HYDROCHLORIDE 20 MG/ML
INJECTION, SOLUTION INFILTRATION; PERINEURAL PRN
Status: DISCONTINUED | OUTPATIENT
Start: 2018-11-01 | End: 2018-11-01 | Stop reason: SDUPTHER

## 2018-11-01 RX ORDER — EPHEDRINE SULFATE/0.9% NACL/PF 50 MG/5 ML
SYRINGE (ML) INTRAVENOUS PRN
Status: DISCONTINUED | OUTPATIENT
Start: 2018-11-01 | End: 2018-11-01 | Stop reason: SDUPTHER

## 2018-11-01 RX ORDER — PROMETHAZINE HYDROCHLORIDE 25 MG/ML
25 INJECTION, SOLUTION INTRAMUSCULAR; INTRAVENOUS PRN
Status: DISCONTINUED | OUTPATIENT
Start: 2018-11-01 | End: 2018-11-01 | Stop reason: HOSPADM

## 2018-11-01 RX ORDER — MIDAZOLAM HYDROCHLORIDE 1 MG/ML
INJECTION INTRAMUSCULAR; INTRAVENOUS PRN
Status: DISCONTINUED | OUTPATIENT
Start: 2018-11-01 | End: 2018-11-01 | Stop reason: SDUPTHER

## 2018-11-01 RX ORDER — MORPHINE SULFATE 2 MG/ML
2 INJECTION, SOLUTION INTRAMUSCULAR; INTRAVENOUS
Status: DISCONTINUED | OUTPATIENT
Start: 2018-11-01 | End: 2018-11-02

## 2018-11-01 RX ORDER — HYDROCODONE BITARTRATE AND ACETAMINOPHEN 5; 325 MG/1; MG/1
1 TABLET ORAL EVERY 4 HOURS PRN
Status: DISCONTINUED | OUTPATIENT
Start: 2018-11-01 | End: 2018-11-05 | Stop reason: HOSPADM

## 2018-11-01 RX ORDER — SUCCINYLCHOLINE CHLORIDE 20 MG/ML
INJECTION INTRAMUSCULAR; INTRAVENOUS PRN
Status: DISCONTINUED | OUTPATIENT
Start: 2018-11-01 | End: 2018-11-01 | Stop reason: SDUPTHER

## 2018-11-01 RX ORDER — SODIUM CHLORIDE, SODIUM LACTATE, POTASSIUM CHLORIDE, CALCIUM CHLORIDE 600; 310; 30; 20 MG/100ML; MG/100ML; MG/100ML; MG/100ML
INJECTION, SOLUTION INTRAVENOUS CONTINUOUS
Status: DISCONTINUED | OUTPATIENT
Start: 2018-11-01 | End: 2018-11-02

## 2018-11-01 RX ORDER — HYDROCODONE BITARTRATE AND ACETAMINOPHEN 5; 325 MG/1; MG/1
2 TABLET ORAL EVERY 4 HOURS PRN
Status: DISCONTINUED | OUTPATIENT
Start: 2018-11-01 | End: 2018-11-05 | Stop reason: HOSPADM

## 2018-11-01 RX ORDER — MORPHINE SULFATE 4 MG/ML
4 INJECTION, SOLUTION INTRAMUSCULAR; INTRAVENOUS
Status: DISCONTINUED | OUTPATIENT
Start: 2018-11-01 | End: 2018-11-02

## 2018-11-01 RX ORDER — FENTANYL CITRATE 50 UG/ML
INJECTION, SOLUTION INTRAMUSCULAR; INTRAVENOUS PRN
Status: DISCONTINUED | OUTPATIENT
Start: 2018-11-01 | End: 2018-11-01 | Stop reason: SDUPTHER

## 2018-11-01 RX ORDER — ROCURONIUM BROMIDE 10 MG/ML
INJECTION, SOLUTION INTRAVENOUS PRN
Status: DISCONTINUED | OUTPATIENT
Start: 2018-11-01 | End: 2018-11-01 | Stop reason: SDUPTHER

## 2018-11-01 RX ADMIN — FENTANYL CITRATE 50 MCG: 50 INJECTION, SOLUTION INTRAMUSCULAR; INTRAVENOUS at 15:18

## 2018-11-01 RX ADMIN — Medication 10 ML: at 08:21

## 2018-11-01 RX ADMIN — SUCCINYLCHOLINE CHLORIDE 120 MG: 20 INJECTION, SOLUTION INTRAMUSCULAR; INTRAVENOUS at 14:36

## 2018-11-01 RX ADMIN — OXYCODONE HYDROCHLORIDE 10 MG: 10 TABLET, FILM COATED, EXTENDED RELEASE ORAL at 06:12

## 2018-11-01 RX ADMIN — HYDROCODONE BITARTRATE AND ACETAMINOPHEN 2 TABLET: 5; 325 TABLET ORAL at 22:18

## 2018-11-01 RX ADMIN — LISINOPRIL 10 MG: 10 TABLET ORAL at 08:09

## 2018-11-01 RX ADMIN — Medication 10 ML: at 08:18

## 2018-11-01 RX ADMIN — Medication 10 MG: at 15:46

## 2018-11-01 RX ADMIN — PROPOFOL 30 MG: 10 INJECTION, EMULSION INTRAVENOUS at 15:40

## 2018-11-01 RX ADMIN — OXYCODONE HYDROCHLORIDE 10 MG: 10 TABLET, FILM COATED, EXTENDED RELEASE ORAL at 20:57

## 2018-11-01 RX ADMIN — TIZANIDINE 4 MG: 4 TABLET ORAL at 08:09

## 2018-11-01 RX ADMIN — PROPOFOL 140 MG: 10 INJECTION, EMULSION INTRAVENOUS at 14:36

## 2018-11-01 RX ADMIN — PREGABALIN 200 MG: 100 CAPSULE ORAL at 08:10

## 2018-11-01 RX ADMIN — FENTANYL CITRATE 50 MCG: 50 INJECTION, SOLUTION INTRAMUSCULAR; INTRAVENOUS at 15:34

## 2018-11-01 RX ADMIN — FENTANYL CITRATE 50 MCG: 50 INJECTION, SOLUTION INTRAMUSCULAR; INTRAVENOUS at 14:36

## 2018-11-01 RX ADMIN — Medication 3 MG: at 17:53

## 2018-11-01 RX ADMIN — MIDAZOLAM HYDROCHLORIDE 2 MG: 1 INJECTION, SOLUTION INTRAMUSCULAR; INTRAVENOUS at 14:27

## 2018-11-01 RX ADMIN — MORPHINE SULFATE 2 MG: 10 INJECTION INTRAVENOUS at 18:18

## 2018-11-01 RX ADMIN — CLONIDINE HYDROCHLORIDE 0.2 MG: 0.2 TABLET ORAL at 23:16

## 2018-11-01 RX ADMIN — ONDANSETRON 4 MG: 2 INJECTION INTRAMUSCULAR; INTRAVENOUS at 15:18

## 2018-11-01 RX ADMIN — SPIRONOLACTONE 50 MG: 25 TABLET ORAL at 08:09

## 2018-11-01 RX ADMIN — MORPHINE SULFATE 2 MG: 10 INJECTION INTRAVENOUS at 17:52

## 2018-11-01 RX ADMIN — Medication 0.4 MG: at 17:53

## 2018-11-01 RX ADMIN — CLONIDINE HYDROCHLORIDE 0.2 MG: 0.2 TABLET ORAL at 08:17

## 2018-11-01 RX ADMIN — AMLODIPINE BESYLATE 10 MG: 10 TABLET ORAL at 08:09

## 2018-11-01 RX ADMIN — ROCURONIUM BROMIDE 5 MG: 10 INJECTION INTRAVENOUS at 14:36

## 2018-11-01 RX ADMIN — DULOXETINE HYDROCHLORIDE 120 MG: 60 CAPSULE, DELAYED RELEASE ORAL at 08:09

## 2018-11-01 RX ADMIN — INSULIN LISPRO 3 UNITS: 100 INJECTION, SOLUTION INTRAVENOUS; SUBCUTANEOUS at 23:21

## 2018-11-01 RX ADMIN — FENTANYL CITRATE 50 MCG: 50 INJECTION, SOLUTION INTRAMUSCULAR; INTRAVENOUS at 14:40

## 2018-11-01 RX ADMIN — PHENYLEPHRINE HYDROCHLORIDE 0.04 MCG: 10 INJECTION INTRAVENOUS at 16:20

## 2018-11-01 RX ADMIN — LIDOCAINE HYDROCHLORIDE 100 MG: 20 INJECTION, SOLUTION INFILTRATION; PERINEURAL at 14:36

## 2018-11-01 RX ADMIN — ATORVASTATIN CALCIUM 10 MG: 10 TABLET, FILM COATED ORAL at 08:09

## 2018-11-01 RX ADMIN — MORPHINE SULFATE 2 MG: 10 INJECTION INTRAVENOUS at 18:15

## 2018-11-01 RX ADMIN — SODIUM CHLORIDE: 9 INJECTION, SOLUTION INTRAVENOUS at 14:27

## 2018-11-01 RX ADMIN — Medication 10 MG: at 16:03

## 2018-11-01 RX ADMIN — MEROPENEM 1 G: 1 INJECTION, POWDER, FOR SOLUTION INTRAVENOUS at 23:02

## 2018-11-01 RX ADMIN — MEROPENEM 1 G: 1 INJECTION, POWDER, FOR SOLUTION INTRAVENOUS at 12:37

## 2018-11-01 RX ADMIN — METOPROLOL TARTRATE 100 MG: 50 TABLET, FILM COATED ORAL at 08:09

## 2018-11-01 RX ADMIN — HYDROMORPHONE HYDROCHLORIDE 0.5 MG: 1 INJECTION, SOLUTION INTRAMUSCULAR; INTRAVENOUS; SUBCUTANEOUS at 18:44

## 2018-11-01 RX ADMIN — DAPTOMYCIN 500 MG: 500 INJECTION, POWDER, LYOPHILIZED, FOR SOLUTION INTRAVENOUS at 11:31

## 2018-11-01 RX ADMIN — Medication 10 MG: at 15:53

## 2018-11-01 RX ADMIN — MORPHINE SULFATE 4 MG: 4 INJECTION, SOLUTION INTRAMUSCULAR; INTRAVENOUS at 23:36

## 2018-11-01 RX ADMIN — METOPROLOL TARTRATE 100 MG: 50 TABLET, FILM COATED ORAL at 23:17

## 2018-11-01 RX ADMIN — HYDROMORPHONE HYDROCHLORIDE 0.5 MG: 1 INJECTION, SOLUTION INTRAMUSCULAR; INTRAVENOUS; SUBCUTANEOUS at 18:52

## 2018-11-01 RX ADMIN — ROCURONIUM BROMIDE 15 MG: 10 INJECTION INTRAVENOUS at 14:48

## 2018-11-01 RX ADMIN — Medication 0.2 MG: at 15:35

## 2018-11-01 RX ADMIN — CILOSTAZOL 100 MG: 100 TABLET ORAL at 23:18

## 2018-11-01 RX ADMIN — SODIUM CHLORIDE, POTASSIUM CHLORIDE, SODIUM LACTATE AND CALCIUM CHLORIDE: 600; 310; 30; 20 INJECTION, SOLUTION INTRAVENOUS at 22:58

## 2018-11-01 RX ADMIN — PROPOFOL 80 MG: 10 INJECTION, EMULSION INTRAVENOUS at 17:45

## 2018-11-01 RX ADMIN — MEROPENEM 1 G: 1 INJECTION, POWDER, FOR SOLUTION INTRAVENOUS at 05:06

## 2018-11-01 RX ADMIN — MORPHINE SULFATE 2 MG: 10 INJECTION INTRAVENOUS at 18:19

## 2018-11-01 RX ADMIN — CILOSTAZOL 100 MG: 100 TABLET ORAL at 08:09

## 2018-11-01 RX ADMIN — PANTOPRAZOLE SODIUM 40 MG: 40 TABLET, DELAYED RELEASE ORAL at 06:12

## 2018-11-01 RX ADMIN — Medication 10 ML: at 05:06

## 2018-11-01 RX ADMIN — MORPHINE SULFATE 2 MG: 10 INJECTION INTRAVENOUS at 18:11

## 2018-11-01 RX ADMIN — FENTANYL CITRATE 50 MCG: 50 INJECTION, SOLUTION INTRAMUSCULAR; INTRAVENOUS at 16:15

## 2018-11-01 ASSESSMENT — PULMONARY FUNCTION TESTS
PIF_VALUE: 22
PIF_VALUE: 28
PIF_VALUE: 27
PIF_VALUE: 28
PIF_VALUE: 29
PIF_VALUE: 3
PIF_VALUE: 29
PIF_VALUE: 3
PIF_VALUE: 28
PIF_VALUE: 17
PIF_VALUE: 30
PIF_VALUE: 28
PIF_VALUE: 29
PIF_VALUE: 29
PIF_VALUE: 35
PIF_VALUE: 29
PIF_VALUE: 28
PIF_VALUE: 29
PIF_VALUE: 28
PIF_VALUE: 35
PIF_VALUE: 29
PIF_VALUE: 4
PIF_VALUE: 28
PIF_VALUE: 29
PIF_VALUE: 28
PIF_VALUE: 29
PIF_VALUE: 2
PIF_VALUE: 28
PIF_VALUE: 29
PIF_VALUE: 29
PIF_VALUE: 28
PIF_VALUE: 29
PIF_VALUE: 29
PIF_VALUE: 1
PIF_VALUE: 27
PIF_VALUE: 29
PIF_VALUE: 28
PIF_VALUE: 29
PIF_VALUE: 3
PIF_VALUE: 29
PIF_VALUE: 2
PIF_VALUE: 28
PIF_VALUE: 29
PIF_VALUE: 2
PIF_VALUE: 28
PIF_VALUE: 29
PIF_VALUE: 28
PIF_VALUE: 28
PIF_VALUE: 29
PIF_VALUE: 28
PIF_VALUE: 28
PIF_VALUE: 29
PIF_VALUE: 28
PIF_VALUE: 29
PIF_VALUE: 29
PIF_VALUE: 30
PIF_VALUE: 29
PIF_VALUE: 3
PIF_VALUE: 29
PIF_VALUE: 29
PIF_VALUE: 30
PIF_VALUE: 28
PIF_VALUE: 22
PIF_VALUE: 28
PIF_VALUE: 29
PIF_VALUE: 28
PIF_VALUE: 28
PIF_VALUE: 30
PIF_VALUE: 28
PIF_VALUE: 28
PIF_VALUE: 11
PIF_VALUE: 29
PIF_VALUE: 28
PIF_VALUE: 3
PIF_VALUE: 28
PIF_VALUE: 32
PIF_VALUE: 4
PIF_VALUE: 28
PIF_VALUE: 28
PIF_VALUE: 29
PIF_VALUE: 28
PIF_VALUE: 28
PIF_VALUE: 29
PIF_VALUE: 33
PIF_VALUE: 3
PIF_VALUE: 28
PIF_VALUE: 1
PIF_VALUE: 28
PIF_VALUE: 28
PIF_VALUE: 3
PIF_VALUE: 29
PIF_VALUE: 16
PIF_VALUE: 30
PIF_VALUE: 28
PIF_VALUE: 3
PIF_VALUE: 28
PIF_VALUE: 42
PIF_VALUE: 29
PIF_VALUE: 29
PIF_VALUE: 28
PIF_VALUE: 29
PIF_VALUE: 28
PIF_VALUE: 28
PIF_VALUE: 29
PIF_VALUE: 28
PIF_VALUE: 29
PIF_VALUE: 27
PIF_VALUE: 28
PIF_VALUE: 21
PIF_VALUE: 29
PIF_VALUE: 1
PIF_VALUE: 28
PIF_VALUE: 1
PIF_VALUE: 29
PIF_VALUE: 30
PIF_VALUE: 29
PIF_VALUE: 28
PIF_VALUE: 12
PIF_VALUE: 29
PIF_VALUE: 27
PIF_VALUE: 28
PIF_VALUE: 29
PIF_VALUE: 28
PIF_VALUE: 28
PIF_VALUE: 29
PIF_VALUE: 14
PIF_VALUE: 27
PIF_VALUE: 28
PIF_VALUE: 29
PIF_VALUE: 15
PIF_VALUE: 21
PIF_VALUE: 29
PIF_VALUE: 28
PIF_VALUE: 29
PIF_VALUE: 30
PIF_VALUE: 29
PIF_VALUE: 28
PIF_VALUE: 29
PIF_VALUE: 29
PIF_VALUE: 31
PIF_VALUE: 29
PIF_VALUE: 28
PIF_VALUE: 28
PIF_VALUE: 29
PIF_VALUE: 28
PIF_VALUE: 28
PIF_VALUE: 4
PIF_VALUE: 28
PIF_VALUE: 28
PIF_VALUE: 29
PIF_VALUE: 30
PIF_VALUE: 29
PIF_VALUE: 30
PIF_VALUE: 29
PIF_VALUE: 29
PIF_VALUE: 28
PIF_VALUE: 28
PIF_VALUE: 29
PIF_VALUE: 28
PIF_VALUE: 34
PIF_VALUE: 3
PIF_VALUE: 3
PIF_VALUE: 29
PIF_VALUE: 30
PIF_VALUE: 28
PIF_VALUE: 29
PIF_VALUE: 28
PIF_VALUE: 28
PIF_VALUE: 29
PIF_VALUE: 28
PIF_VALUE: 30
PIF_VALUE: 29
PIF_VALUE: 28
PIF_VALUE: 28
PIF_VALUE: 29
PIF_VALUE: 28
PIF_VALUE: 28
PIF_VALUE: 29
PIF_VALUE: 29
PIF_VALUE: 28
PIF_VALUE: 30
PIF_VALUE: 28

## 2018-11-01 ASSESSMENT — PAIN DESCRIPTION - ORIENTATION
ORIENTATION: RIGHT

## 2018-11-01 ASSESSMENT — PAIN DESCRIPTION - ONSET
ONSET: AWAKENED FROM SLEEP
ONSET: AWAKENED FROM SLEEP

## 2018-11-01 ASSESSMENT — PAIN SCALES - GENERAL
PAINLEVEL_OUTOF10: 9
PAINLEVEL_OUTOF10: 10
PAINLEVEL_OUTOF10: 10
PAINLEVEL_OUTOF10: 5
PAINLEVEL_OUTOF10: 0
PAINLEVEL_OUTOF10: 0
PAINLEVEL_OUTOF10: 8
PAINLEVEL_OUTOF10: 10
PAINLEVEL_OUTOF10: 10
PAINLEVEL_OUTOF10: 0
PAINLEVEL_OUTOF10: 10
PAINLEVEL_OUTOF10: 0
PAINLEVEL_OUTOF10: 10
PAINLEVEL_OUTOF10: 10
PAINLEVEL_OUTOF10: 0

## 2018-11-01 ASSESSMENT — PAIN DESCRIPTION - LOCATION
LOCATION: LEG
LOCATION: FOOT;LEG

## 2018-11-01 ASSESSMENT — PAIN DESCRIPTION - PAIN TYPE
TYPE: SURGICAL PAIN
TYPE: ACUTE PAIN

## 2018-11-01 ASSESSMENT — PAIN DESCRIPTION - DESCRIPTORS
DESCRIPTORS: BURNING;CRAMPING
DESCRIPTORS: BURNING;CRAMPING

## 2018-11-01 ASSESSMENT — PAIN DESCRIPTION - FREQUENCY
FREQUENCY: CONTINUOUS
FREQUENCY: CONTINUOUS

## 2018-11-01 NOTE — OP NOTE
vertical mattress type fashion was used to help approximate the skin and the skin was then approximated with staples. Skin was clean and dried again. Due to size of stump and significant oozing during the procedure a wound vac was placed on the incision.   All care skin prep, than mepitel one and vac sponge than duoderm thin and vac drape.  + seal.      Beni Guthrie

## 2018-11-01 NOTE — PROGRESS NOTES
aspirin  81 mg Oral Daily    amLODIPine  10 mg Oral QAM    atorvastatin  10 mg Oral Daily    cilostazol  100 mg Oral BID    cloNIDine  0.2 mg Oral TID    DULoxetine  120 mg Oral QAM    pantoprazole  40 mg Oral QAM AC    insulin glargine  15 Units Subcutaneous BID    lisinopril  10 mg Oral Daily    metoprolol  100 mg Oral BID    oxyCODONE  10 mg Oral Q8H    pregabalin  200 mg Oral TID    spironolactone  50 mg Oral QAM    sodium chloride flush  10 mL Intravenous 2 times per day     PRN Meds: promethazine, labetalol, meperidine, HYDROmorphone, HYDROmorphone, sodium chloride flush, LORazepam, tiZANidine, magnesium hydroxide, ondansetron, glucose, dextrose, glucagon (rDNA), dextrose    I/O    Intake/Output Summary (Last 24 hours) at 11/01/18 1339  Last data filed at 11/01/18 0502   Gross per 24 hour   Intake                0 ml   Output             1125 ml   Net            -1125 ml       Labs:   Recent Labs      10/30/18   0705  10/31/18   0615  11/01/18   0540   WBC  10.3  11.0  9.7   HGB  7.9*  8.2*  7.8*   HCT  26.1*  27.5*  26.3*   PLT  251  241  227       Recent Labs      10/31/18   1728   NA  142   K  4.2   CL  103   CO2  21*   BUN  16   CREATININE  1.5*   CALCIUM  8.8       No results for input(s): PROT, ALB, ALKPHOS, ALT, AST, BILITOT, AMYLASE, LIPASE in the last 72 hours. No results for input(s): INR in the last 72 hours. No results for input(s): Randolm Sellar in the last 72 hours. Chronic labs:  Lab Results   Component Value Date    CHOL 108 05/23/2018    TRIG 81 05/23/2018    HDL 26 05/23/2018    LDLCALC 66 05/23/2018    TSH 1.100 10/25/2018    PSA 0.41 05/24/2018    INR 1.9 10/25/2018    LABA1C 10.4 (H) 10/25/2018       Radiology:  Imaging studies reviewed today.     ASSESSMENT:    Principal Problem:    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 2 diabetes mellitus, with fat layer exposed ([address]  Active Problems:    HTN (hypertension)    PVD (peripheral vascular disease) ([facility]    Microcytic anemia    Stage 3 chronic kidney disease (HCC)    Morbid obesity with BMI of 40.0-44.9, adult (HCC)    Tobacco dependence    Uncontrolled diabetes mellitus with hyperglycemia ([facility])    HLD (hyperlipidemia)    History of DVT (deep vein thrombosis)    Osteomyelitis ([facility])    Chronic ulcer of right foot with fat layer exposed ([facility]    Staphylococcus aureus bacteremia without sepsis  Resolved Problems:    Diabetic foot infection ([facility]      PLAN:    Recurrent Diabetic Foot Ulcer with Midfoot Osteomyelitis   -- Podiatry feels that may need amputation at this time and have consulted Vascular Surgery  -- Now agreeable for amputation. OR today    -- Infectious disease following -- Dapto / No Katz ( Currently accepting)   -- Repeat blood Cultures   -- Negative JUSTO evaluation for Endocarditis  --ID agrees with amputation. -- Daptomycin plus meropenem. DC meropenem post\". Diabetes Mellitus Type II with Vascular Complications  -- Blood Sugar adequately controlled  -- Titrate regimen      Chronic Kidney Disease   -- Stable     Rest chronic Medical issues remain stable at this time   -- Continue home medications     PT OT - assess after R AKA on 11/1/18    Diet: Dietary Nutrition Supplements: Low Calorie High Protein Supplement  Diet NPO, After Midnight Exceptions are: Ice Chips, Sips with Meds  Code Status: Full Code  PT/OT Eval Status:   Ordered  DVT Prophylaxis:   Lovenox  Recommended disposition at discharge:  -- TBD    +++++++++++++++++++++++++++++++++++++++++++++++++  Ted Rodriguez MD   Associate Chief Hospitalist, [address].  +++++++++++++++++++++++++++++++++++++++++++++++++  NOTE: This report was transcribed using voice recognition software.  Every effort was made to ensure accuracy; however, inadvertent computerized transcription errors may be present.

## 2018-11-01 NOTE — PROGRESS NOTES
Level: Moderate    Nutrition Interventions:   Continued Inpatient Monitoring, Education not appropriate at this time, Coordination of Care    Nutrition Evaluation:   · Evaluation: Goals set   · Goals: Consume >75% meals/ONS w/ diet progression    · Monitoring: NPO Status, Diet Progression, Meal Intake, Supplement Intake, Diet Tolerance, Skin Integrity, Wound Healing, Fluid Balance, Ascites/Edema, Weight, Comparative Standards, Pertinent Labs    See Adult Nutrition Doc Flowsheet for more detail.      Electronically signed by Simone Aguila MS, RD, LD on 11/1/18 at 12:23 PM    Contact Number: [id number]

## 2018-11-02 LAB
ANION GAP SERPL CALCULATED.3IONS-SCNC: 11 MMOL/L (ref 7–16)
BASOPHILS ABSOLUTE: 0.02 E9/L (ref 0–0.2)
BASOPHILS RELATIVE PERCENT: 0.1 % (ref 0–2)
BUN BLDV-MCNC: 17 MG/DL (ref 8–23)
CALCIUM SERPL-MCNC: 9 MG/DL (ref 8.6–10.2)
CHLORIDE BLD-SCNC: 98 MMOL/L (ref 98–107)
CO2: 28 MMOL/L (ref 22–29)
CREAT SERPL-MCNC: 1.5 MG/DL (ref 0.7–1.2)
EOSINOPHILS ABSOLUTE: 0.02 E9/L (ref 0.05–0.5)
EOSINOPHILS RELATIVE PERCENT: 0.1 % (ref 0–6)
GFR AFRICAN AMERICAN: 58
GFR NON-AFRICAN AMERICAN: 58 ML/MIN/1.73
GLUCOSE BLD-MCNC: 190 MG/DL (ref 74–109)
HCT VFR BLD CALC: 31.4 % (ref 37–54)
HEMOGLOBIN: 9.7 G/DL (ref 12.5–16.5)
IMMATURE GRANULOCYTES #: 0.14 E9/L
IMMATURE GRANULOCYTES %: 0.8 % (ref 0–5)
LYMPHOCYTES ABSOLUTE: 2.3 E9/L (ref 1.5–4)
LYMPHOCYTES RELATIVE PERCENT: 13 % (ref 20–42)
MCH RBC QN AUTO: 22.6 PG (ref 26–35)
MCHC RBC AUTO-ENTMCNC: 30.9 % (ref 32–34.5)
MCV RBC AUTO: 73.2 FL (ref 80–99.9)
METER GLUCOSE: 171 MG/DL (ref 70–110)
METER GLUCOSE: 186 MG/DL (ref 70–110)
METER GLUCOSE: 194 MG/DL (ref 70–110)
METER GLUCOSE: 195 MG/DL (ref 70–110)
MONOCYTES ABSOLUTE: 1.03 E9/L (ref 0.1–0.95)
MONOCYTES RELATIVE PERCENT: 5.8 % (ref 2–12)
NEUTROPHILS ABSOLUTE: 14.24 E9/L (ref 1.8–7.3)
NEUTROPHILS RELATIVE PERCENT: 80.2 % (ref 43–80)
PDW BLD-RTO: 19.7 FL (ref 11.5–15)
PLATELET # BLD: 252 E9/L (ref 130–450)
PMV BLD AUTO: 10.8 FL (ref 7–12)
POTASSIUM REFLEX MAGNESIUM: 4.5 MMOL/L (ref 3.5–5)
RBC # BLD: 4.29 E12/L (ref 3.8–5.8)
SODIUM BLD-SCNC: 137 MMOL/L (ref 132–146)
WBC # BLD: 17.8 E9/L (ref 4.5–11.5)

## 2018-11-02 PROCEDURE — 6360000002 HC RX W HCPCS: Performed by: STUDENT IN AN ORGANIZED HEALTH CARE EDUCATION/TRAINING PROGRAM

## 2018-11-02 PROCEDURE — 6370000000 HC RX 637 (ALT 250 FOR IP): Performed by: PODIATRIST

## 2018-11-02 PROCEDURE — 1200000000 HC SEMI PRIVATE

## 2018-11-02 PROCEDURE — G8981 BODY POS CURRENT STATUS: HCPCS | Performed by: PHYSICAL THERAPIST

## 2018-11-02 PROCEDURE — 36415 COLL VENOUS BLD VENIPUNCTURE: CPT

## 2018-11-02 PROCEDURE — 80048 BASIC METABOLIC PNL TOTAL CA: CPT

## 2018-11-02 PROCEDURE — G8988 SELF CARE GOAL STATUS: HCPCS

## 2018-11-02 PROCEDURE — 6370000000 HC RX 637 (ALT 250 FOR IP): Performed by: STUDENT IN AN ORGANIZED HEALTH CARE EDUCATION/TRAINING PROGRAM

## 2018-11-02 PROCEDURE — 6370000000 HC RX 637 (ALT 250 FOR IP): Performed by: FAMILY MEDICINE

## 2018-11-02 PROCEDURE — 97161 PT EVAL LOW COMPLEX 20 MIN: CPT | Performed by: PHYSICAL THERAPIST

## 2018-11-02 PROCEDURE — 2580000003 HC RX 258: Performed by: INTERNAL MEDICINE

## 2018-11-02 PROCEDURE — 97530 THERAPEUTIC ACTIVITIES: CPT | Performed by: PHYSICAL THERAPIST

## 2018-11-02 PROCEDURE — G8987 SELF CARE CURRENT STATUS: HCPCS

## 2018-11-02 PROCEDURE — 6370000000 HC RX 637 (ALT 250 FOR IP): Performed by: INTERNAL MEDICINE

## 2018-11-02 PROCEDURE — 82962 GLUCOSE BLOOD TEST: CPT

## 2018-11-02 PROCEDURE — 6360000002 HC RX W HCPCS: Performed by: INTERNAL MEDICINE

## 2018-11-02 PROCEDURE — 97530 THERAPEUTIC ACTIVITIES: CPT

## 2018-11-02 PROCEDURE — 97165 OT EVAL LOW COMPLEX 30 MIN: CPT

## 2018-11-02 PROCEDURE — G8982 BODY POS GOAL STATUS: HCPCS | Performed by: PHYSICAL THERAPIST

## 2018-11-02 PROCEDURE — 85025 COMPLETE CBC W/AUTO DIFF WBC: CPT

## 2018-11-02 RX ORDER — PETROLATUM 42 G/100G
OINTMENT TOPICAL 2 TIMES DAILY
Status: DISCONTINUED | OUTPATIENT
Start: 2018-11-02 | End: 2018-11-05 | Stop reason: HOSPADM

## 2018-11-02 RX ORDER — SENNA AND DOCUSATE SODIUM 50; 8.6 MG/1; MG/1
2 TABLET, FILM COATED ORAL 2 TIMES DAILY
Status: DISCONTINUED | OUTPATIENT
Start: 2018-11-02 | End: 2018-11-03

## 2018-11-02 RX ORDER — HEPARIN SODIUM 10000 [USP'U]/ML
5000 INJECTION, SOLUTION INTRAVENOUS; SUBCUTANEOUS EVERY 8 HOURS SCHEDULED
Status: DISCONTINUED | OUTPATIENT
Start: 2018-11-02 | End: 2018-11-05 | Stop reason: HOSPADM

## 2018-11-02 RX ORDER — MORPHINE SULFATE 15 MG/1
15 TABLET, FILM COATED, EXTENDED RELEASE ORAL EVERY 12 HOURS SCHEDULED
Status: DISCONTINUED | OUTPATIENT
Start: 2018-11-02 | End: 2018-11-05 | Stop reason: HOSPADM

## 2018-11-02 RX ADMIN — Medication 10 ML: at 21:46

## 2018-11-02 RX ADMIN — HYDROCODONE BITARTRATE AND ACETAMINOPHEN 2 TABLET: 5; 325 TABLET ORAL at 12:06

## 2018-11-02 RX ADMIN — AMLODIPINE BESYLATE 10 MG: 10 TABLET ORAL at 08:17

## 2018-11-02 RX ADMIN — SENNOSIDES AND DOCUSATE SODIUM 2 TABLET: 8.6; 5 TABLET ORAL at 10:22

## 2018-11-02 RX ADMIN — CILOSTAZOL 100 MG: 100 TABLET ORAL at 21:43

## 2018-11-02 RX ADMIN — FERROUS SULFATE TAB 325 MG (65 MG ELEMENTAL FE) 325 MG: 325 (65 FE) TAB at 16:19

## 2018-11-02 RX ADMIN — PREGABALIN 200 MG: 100 CAPSULE ORAL at 13:36

## 2018-11-02 RX ADMIN — HEPARIN SODIUM 5000 UNITS: 10000 INJECTION INTRAVENOUS; SUBCUTANEOUS at 15:13

## 2018-11-02 RX ADMIN — OXYCODONE HYDROCHLORIDE 10 MG: 10 TABLET, FILM COATED, EXTENDED RELEASE ORAL at 06:03

## 2018-11-02 RX ADMIN — HYDROMORPHONE HYDROCHLORIDE 1 MG: 1 INJECTION, SOLUTION INTRAMUSCULAR; INTRAVENOUS; SUBCUTANEOUS at 08:20

## 2018-11-02 RX ADMIN — MEROPENEM 1 G: 1 INJECTION, POWDER, FOR SOLUTION INTRAVENOUS at 13:37

## 2018-11-02 RX ADMIN — Medication 10 ML: at 08:19

## 2018-11-02 RX ADMIN — DAPTOMYCIN 500 MG: 500 INJECTION, POWDER, LYOPHILIZED, FOR SOLUTION INTRAVENOUS at 11:25

## 2018-11-02 RX ADMIN — CILOSTAZOL 100 MG: 100 TABLET ORAL at 08:18

## 2018-11-02 RX ADMIN — MORPHINE SULFATE 15 MG: 15 TABLET, FILM COATED, EXTENDED RELEASE ORAL at 21:46

## 2018-11-02 RX ADMIN — METOPROLOL TARTRATE 100 MG: 50 TABLET, FILM COATED ORAL at 08:17

## 2018-11-02 RX ADMIN — CLONIDINE HYDROCHLORIDE 0.2 MG: 0.2 TABLET ORAL at 08:18

## 2018-11-02 RX ADMIN — INSULIN LISPRO 4 UNITS: 100 INJECTION, SOLUTION INTRAVENOUS; SUBCUTANEOUS at 16:18

## 2018-11-02 RX ADMIN — HEPARIN SODIUM 5000 UNITS: 10000 INJECTION INTRAVENOUS; SUBCUTANEOUS at 21:54

## 2018-11-02 RX ADMIN — FERROUS SULFATE TAB 325 MG (65 MG ELEMENTAL FE) 325 MG: 325 (65 FE) TAB at 08:18

## 2018-11-02 RX ADMIN — HYDROCODONE BITARTRATE AND ACETAMINOPHEN 2 TABLET: 5; 325 TABLET ORAL at 16:19

## 2018-11-02 RX ADMIN — INSULIN LISPRO 4 UNITS: 100 INJECTION, SOLUTION INTRAVENOUS; SUBCUTANEOUS at 11:06

## 2018-11-02 RX ADMIN — MORPHINE SULFATE 15 MG: 15 TABLET, FILM COATED, EXTENDED RELEASE ORAL at 10:22

## 2018-11-02 RX ADMIN — SENNOSIDES AND DOCUSATE SODIUM 2 TABLET: 8.6; 5 TABLET ORAL at 21:45

## 2018-11-02 RX ADMIN — ATORVASTATIN CALCIUM 10 MG: 10 TABLET, FILM COATED ORAL at 08:17

## 2018-11-02 RX ADMIN — ASPIRIN 81 MG CHEWABLE TABLET 81 MG: 81 TABLET CHEWABLE at 08:19

## 2018-11-02 RX ADMIN — INSULIN LISPRO 3 UNITS: 100 INJECTION, SOLUTION INTRAVENOUS; SUBCUTANEOUS at 16:18

## 2018-11-02 RX ADMIN — DULOXETINE HYDROCHLORIDE 120 MG: 60 CAPSULE, DELAYED RELEASE ORAL at 08:17

## 2018-11-02 RX ADMIN — CLONIDINE HYDROCHLORIDE 0.2 MG: 0.2 TABLET ORAL at 13:36

## 2018-11-02 RX ADMIN — SPIRONOLACTONE 50 MG: 25 TABLET ORAL at 08:18

## 2018-11-02 RX ADMIN — PREGABALIN 200 MG: 100 CAPSULE ORAL at 08:19

## 2018-11-02 RX ADMIN — PREGABALIN 200 MG: 100 CAPSULE ORAL at 21:43

## 2018-11-02 RX ADMIN — INSULIN LISPRO 3 UNITS: 100 INJECTION, SOLUTION INTRAVENOUS; SUBCUTANEOUS at 11:06

## 2018-11-02 RX ADMIN — MEROPENEM 1 G: 1 INJECTION, POWDER, FOR SOLUTION INTRAVENOUS at 06:04

## 2018-11-02 RX ADMIN — PETROLATUM: 42 OINTMENT TOPICAL at 17:25

## 2018-11-02 RX ADMIN — LISINOPRIL 10 MG: 10 TABLET ORAL at 08:18

## 2018-11-02 RX ADMIN — MORPHINE SULFATE 4 MG: 4 INJECTION, SOLUTION INTRAMUSCULAR; INTRAVENOUS at 02:40

## 2018-11-02 ASSESSMENT — PAIN DESCRIPTION - PROGRESSION
CLINICAL_PROGRESSION: NOT CHANGED

## 2018-11-02 ASSESSMENT — PAIN DESCRIPTION - PAIN TYPE
TYPE: SURGICAL PAIN

## 2018-11-02 ASSESSMENT — PAIN DESCRIPTION - DESCRIPTORS
DESCRIPTORS: ACHING;DISCOMFORT;SORE
DESCRIPTORS: ACHING;DISCOMFORT;STABBING
DESCRIPTORS: ACHING;DISCOMFORT;STABBING
DESCRIPTORS: ACHING;DISCOMFORT;SORE;THROBBING;STABBING

## 2018-11-02 ASSESSMENT — PAIN DESCRIPTION - ORIENTATION
ORIENTATION: RIGHT

## 2018-11-02 ASSESSMENT — PAIN DESCRIPTION - ONSET
ONSET: ON-GOING

## 2018-11-02 ASSESSMENT — PAIN DESCRIPTION - LOCATION
LOCATION: LEG

## 2018-11-02 ASSESSMENT — PAIN DESCRIPTION - FREQUENCY
FREQUENCY: CONTINUOUS

## 2018-11-02 ASSESSMENT — PAIN SCALES - GENERAL
PAINLEVEL_OUTOF10: 10
PAINLEVEL_OUTOF10: 10
PAINLEVEL_OUTOF10: 6
PAINLEVEL_OUTOF10: 5
PAINLEVEL_OUTOF10: 6
PAINLEVEL_OUTOF10: 6
PAINLEVEL_OUTOF10: 10

## 2018-11-02 NOTE — PROGRESS NOTES
Height : 6'2\"     Weight:  320#   BMI: 41.3         Date: 11/2/18 Date: 11/5/18 Date:    temperature 98.6 98.2    pulse 72 72    respirations 18 18    Blood pressure 180/82 128/64    Pulse oximeter 96% room air 94% room air         ALLERGIES: Patient has no known allergies. DIET : Dietary Nutrition Supplements: Low Calorie High Protein Supplement  DIET CARB CONTROL;    Current Lab and Diagnostic Tests:   Recent Results (from the past 24 hour(s))   POCT Glucose    Collection Time: 11/04/18  4:29 PM   Result Value Ref Range    Meter Glucose 232 (H) 70 - 110 mg/dL   POCT Glucose    Collection Time: 11/04/18  8:15 PM   Result Value Ref Range    Meter Glucose 239 (H) 70 - 110 mg/dL   POCT Glucose    Collection Time: 11/05/18  5:26 AM   Result Value Ref Range    Meter Glucose 200 (H) 74 - 99 mg/dL   CBC Auto Differential    Collection Time: 11/05/18  5:49 AM   Result Value Ref Range    WBC 12.0 (H) 4.5 - 11.5 E9/L    RBC 3.52 (L) 3.80 - 5.80 E12/L    Hemoglobin 7.8 (L) 12.5 - 16.5 g/dL    Hematocrit 25.7 (L) 37.0 - 54.0 %    MCV 73.0 (L) 80.0 - 99.9 fL    MCH 22.2 (L) 26.0 - 35.0 pg    MCHC 30.4 (L) 32.0 - 34.5 %    RDW 20.2 (H) 11.5 - 15.0 fL    Platelets 157 216 - 948 E9/L    MPV 11.4 7.0 - 12.0 fL    Neutrophils % 69.4 43.0 - 80.0 %    Immature Granulocytes % 0.7 0.0 - 5.0 %    Lymphocytes % 19.4 (L) 20.0 - 42.0 %    Monocytes % 7.2 2.0 - 12.0 %    Eosinophils % 3.0 0.0 - 6.0 %    Basophils % 0.3 0.0 - 2.0 %    Neutrophils # 8.32 (H) 1.80 - 7.30 E9/L    Immature Granulocytes # 0.08 E9/L    Lymphocytes # 2.32 1.50 - 4.00 E9/L    Monocytes # 0.86 0.10 - 0.95 E9/L    Eosinophils # 0.36 0.05 - 0.50 E9/L    Basophils # 0.03 0.00 - 0.20 E9/L    Anisocytosis 1+     Polychromasia 1+     Hypochromia 2+      Nm Bone Scan 3 Phase  Result Date: 10/25/2018  1. Abnormal right midfoot multiphase scintigraphic findings consistent with ongoing osteomyelitis here under appropriate clinical circumstances.  Clinical and possible Aspiration  [] Isolation precautions:    [] Contact   [] Respiratory   [] Protective     [] Droplet    [x] Weight Bearing precautions:         [x] Non Weight Bearing right LE        [] Toe Touch Weight Bearing        [] Partial Weight Bearing        [] Weight Bearing as Tolerated        [x] Fall Risk:   [] Recent history of falls [x] Falls risk level (Garcia Scale): high (60)      [x] Bed Alarm    [] Do not leave alone in the bathroom    [] Chair Alarm    [] Cognitive impairment      [] One to One supervision  [] Sitter / Tele sitter   [] Safety enclosure bed  [] Decreased balance     SPECIAL REHABILITATION NEEDS:   [x] IV Therapy: [] PRN Adapter  [] Midline  [x] PICC      [] Central Line    [] TPN       [] Oxygen: [] Trach [] Bi-PAP [] CPAP  [] Nasal cannula  [] Liters:     [x] Wound Care:   [] Pressure ulcers(stage and location)    [] Wound vac   [x] Wound or incision care right AKA    [x] Pain Management (level of pain, meds): 10-6 surgical pain, controlled with norco     [] Incontinence Bladder [x] Servin  Insertion date:11/1/18   []Hemodialysis and  Frequency:   [] Incontinence Bowel    [] Last bowel movement none charted    [x] Substance use history:  [x] Tobacco  [] Alcohol  [] Other     [] Ethnic  [] Cultural  [] Spiritual  [] Language [] Needs  [] Other than English  [] Hearing Impaired  [] Visually Impaired  [] Speaking Impaired  [] Blind    [] Special equipment:  [] Devices/Splints  [] Type   [] Brace   [] Type  [] Bariatric bed  [] Extra wide commode  [] Extra wide wheelchair [] Extra wide walker  [] Paramjit walker  [] Paramjit wheelchair  [] Transfer lift    [] Other equipment     FUNCTIONAL STATUS PT / Claudetta Caro / Jamir Curran:  FIM / EVAL Discipline Initial: 11/2/18 Follow Up: 11/5/18 Current:    Eating OT Independent   nt    Grooming OT Stand by Assist   Set up     Bathing OT Max Assist   nt    Dressing Upper Extremity OT Minimum assistance   Set up     Dressing Lower Extremity OT Max Assist   Max Assist

## 2018-11-02 NOTE — PROGRESS NOTES
WFL  Sensation:  No c/o numbness or tingling  Tone:  WFL  Edema: RLE                            Comments/Treatment: Upon arrival, patient in bed. Pt educated on techniques to increase safety and independence with bed mobility. Sitting EOB x 8 minutes to increase sitting balance and activity tolerance. At end of session, patient in bed with call light and phone within reach, all lines and tubes intact. Pt would benefit from continued skilled OT to increase functional independence and quality of life. Eval Complexity: Low    Assessment of current deficits   Functional mobility [x]  ADLs [x] Strength [x]  Cognition []  Functional transfers  [x] IADLs [x] Safety Awareness [x]  Endurance [x]  Fine Motor Coordination [] Balance [x] Vision/perception [] Sensation []   Gross Motor Coordination [] ROM [] Delirium []                  Motor Control []    Plan of Care:   ADL retraining [x]   Equipment needs [x]   Neuromuscular re-education [x] Energy Conservation Techniques [x]  Functional Transfer training [x] Patient and/or Family Education [x]  Functional Mobility training [x]  Environmental Modifications [x]  Cognitive re-training []   Compensatory techniques for ADLs [x]  Splinting Needs []   Positioning to improve overall function [x]   Therapeutic Activity [x]  Therapeutic Exercise  [x]  Visual/Perceptual: []    Delirium prevention/treatment  []   Other:  []    Rehab Potential: Good for established goals    Patient / Family Goal:  Not stated     Patient and/or family were instructed diagnosis, prognosis/goals and plan of care. Demonstrated fair understanding. [] Malnutrition indicators have been identified and nursing has been notified to ensure a dietitian consult is ordered.      Time in: 1121  Time out: 1140  Evaluation + 8 timed treatment minutes    Narendra Cardenas OTR/L #[id number]

## 2018-11-02 NOTE — PROGRESS NOTES
increased work of breathing  Abd: Soft, non-tender, non-distended  R LE: AKA stump soft. Incision clean with vac in place with good seal. Palpable femoral pulse. Minimal drainage in cannister    LABS:    Lab Results   Component Value Date    WBC 17.8 (H) 11/02/2018    HGB 9.7 (L) 11/02/2018    HCT 31.4 (L) 11/02/2018     11/02/2018    PROTIME 22.0 (H) 10/25/2018    INR 1.9 10/25/2018    APTT 61.1 (H) 07/27/2018    K 4.5 11/02/2018    BUN 17 11/02/2018    CREATININE 1.5 (H) 11/02/2018       A/P  63 y.o. male with who is POD1 s/p R AKA for diabetic foot infection and PVD with ulceration    - Pain control as able--will be difficult given his history of chronic pain and opioid tolerance. Will switch morphine to dilaudid this morning, encourage PO medication use. Continue oxycontin 10 q8h, lyrica, norco.   - continue wound vac to incision  - continue ASA, pletal  - history of diabetes.  Advance to diabetic diet, continue insulin for glucose control.  - monitor wound vac output, minimal overnight.   - DVT prophylaxis with SQH  - PT/OT    Saad Bartholomew

## 2018-11-03 LAB
METER GLUCOSE: 115 MG/DL (ref 70–110)
METER GLUCOSE: 202 MG/DL (ref 70–110)
METER GLUCOSE: 234 MG/DL (ref 70–110)
METER GLUCOSE: 342 MG/DL (ref 70–110)

## 2018-11-03 PROCEDURE — 2580000003 HC RX 258: Performed by: INTERNAL MEDICINE

## 2018-11-03 PROCEDURE — 6370000000 HC RX 637 (ALT 250 FOR IP): Performed by: STUDENT IN AN ORGANIZED HEALTH CARE EDUCATION/TRAINING PROGRAM

## 2018-11-03 PROCEDURE — 6360000002 HC RX W HCPCS: Performed by: STUDENT IN AN ORGANIZED HEALTH CARE EDUCATION/TRAINING PROGRAM

## 2018-11-03 PROCEDURE — 6370000000 HC RX 637 (ALT 250 FOR IP): Performed by: INTERNAL MEDICINE

## 2018-11-03 PROCEDURE — 82962 GLUCOSE BLOOD TEST: CPT

## 2018-11-03 PROCEDURE — 6370000000 HC RX 637 (ALT 250 FOR IP): Performed by: FAMILY MEDICINE

## 2018-11-03 PROCEDURE — 1200000000 HC SEMI PRIVATE

## 2018-11-03 PROCEDURE — 6360000002 HC RX W HCPCS: Performed by: INTERNAL MEDICINE

## 2018-11-03 RX ORDER — SENNA AND DOCUSATE SODIUM 50; 8.6 MG/1; MG/1
2 TABLET, FILM COATED ORAL 2 TIMES DAILY
Status: DISCONTINUED | OUTPATIENT
Start: 2018-11-03 | End: 2018-11-05 | Stop reason: HOSPADM

## 2018-11-03 RX ADMIN — METOPROLOL TARTRATE 100 MG: 50 TABLET, FILM COATED ORAL at 08:10

## 2018-11-03 RX ADMIN — FERROUS SULFATE TAB 325 MG (65 MG ELEMENTAL FE) 325 MG: 325 (65 FE) TAB at 08:10

## 2018-11-03 RX ADMIN — CLONIDINE HYDROCHLORIDE 0.2 MG: 0.2 TABLET ORAL at 20:14

## 2018-11-03 RX ADMIN — HEPARIN SODIUM 5000 UNITS: 10000 INJECTION INTRAVENOUS; SUBCUTANEOUS at 13:30

## 2018-11-03 RX ADMIN — Medication 10 ML: at 20:16

## 2018-11-03 RX ADMIN — DAPTOMYCIN 500 MG: 500 INJECTION, POWDER, LYOPHILIZED, FOR SOLUTION INTRAVENOUS at 10:37

## 2018-11-03 RX ADMIN — CILOSTAZOL 100 MG: 100 TABLET ORAL at 20:22

## 2018-11-03 RX ADMIN — CLONIDINE HYDROCHLORIDE 0.2 MG: 0.2 TABLET ORAL at 08:09

## 2018-11-03 RX ADMIN — PANTOPRAZOLE SODIUM 40 MG: 40 TABLET, DELAYED RELEASE ORAL at 06:38

## 2018-11-03 RX ADMIN — HYDROCODONE BITARTRATE AND ACETAMINOPHEN 2 TABLET: 5; 325 TABLET ORAL at 15:39

## 2018-11-03 RX ADMIN — CILOSTAZOL 100 MG: 100 TABLET ORAL at 08:12

## 2018-11-03 RX ADMIN — INSULIN LISPRO 6 UNITS: 100 INJECTION, SOLUTION INTRAVENOUS; SUBCUTANEOUS at 08:12

## 2018-11-03 RX ADMIN — SENNOSIDES AND DOCUSATE SODIUM 2 TABLET: 8.6; 5 TABLET ORAL at 08:11

## 2018-11-03 RX ADMIN — INSULIN LISPRO 6 UNITS: 100 INJECTION, SOLUTION INTRAVENOUS; SUBCUTANEOUS at 12:59

## 2018-11-03 RX ADMIN — PREGABALIN 200 MG: 100 CAPSULE ORAL at 13:22

## 2018-11-03 RX ADMIN — INSULIN LISPRO 4 UNITS: 100 INJECTION, SOLUTION INTRAVENOUS; SUBCUTANEOUS at 13:06

## 2018-11-03 RX ADMIN — PETROLATUM: 42 OINTMENT TOPICAL at 16:04

## 2018-11-03 RX ADMIN — INSULIN LISPRO 12 UNITS: 100 INJECTION, SOLUTION INTRAVENOUS; SUBCUTANEOUS at 16:04

## 2018-11-03 RX ADMIN — HEPARIN SODIUM 5000 UNITS: 10000 INJECTION INTRAVENOUS; SUBCUTANEOUS at 23:17

## 2018-11-03 RX ADMIN — HEPARIN SODIUM 5000 UNITS: 10000 INJECTION INTRAVENOUS; SUBCUTANEOUS at 06:38

## 2018-11-03 RX ADMIN — HYDROCODONE BITARTRATE AND ACETAMINOPHEN 2 TABLET: 5; 325 TABLET ORAL at 11:26

## 2018-11-03 RX ADMIN — INSULIN GLARGINE 15 UNITS: 100 INJECTION, SOLUTION SUBCUTANEOUS at 08:13

## 2018-11-03 RX ADMIN — FERROUS SULFATE TAB 325 MG (65 MG ELEMENTAL FE) 325 MG: 325 (65 FE) TAB at 16:04

## 2018-11-03 RX ADMIN — MORPHINE SULFATE 15 MG: 15 TABLET, FILM COATED, EXTENDED RELEASE ORAL at 08:11

## 2018-11-03 RX ADMIN — Medication 10 ML: at 11:21

## 2018-11-03 RX ADMIN — MORPHINE SULFATE 15 MG: 15 TABLET, FILM COATED, EXTENDED RELEASE ORAL at 20:15

## 2018-11-03 RX ADMIN — HYDROCODONE BITARTRATE AND ACETAMINOPHEN 2 TABLET: 5; 325 TABLET ORAL at 20:14

## 2018-11-03 RX ADMIN — INSULIN LISPRO 4 UNITS: 100 INJECTION, SOLUTION INTRAVENOUS; SUBCUTANEOUS at 08:31

## 2018-11-03 RX ADMIN — SENNOSIDES AND DOCUSATE SODIUM 2 TABLET: 8.6; 5 TABLET ORAL at 20:14

## 2018-11-03 RX ADMIN — SENNOSIDES AND DOCUSATE SODIUM 2 TABLET: 8.6; 5 TABLET ORAL at 13:34

## 2018-11-03 RX ADMIN — LISINOPRIL 10 MG: 10 TABLET ORAL at 08:10

## 2018-11-03 RX ADMIN — PREGABALIN 200 MG: 100 CAPSULE ORAL at 20:14

## 2018-11-03 RX ADMIN — AMLODIPINE BESYLATE 10 MG: 10 TABLET ORAL at 08:10

## 2018-11-03 RX ADMIN — HYDROCODONE BITARTRATE AND ACETAMINOPHEN 2 TABLET: 5; 325 TABLET ORAL at 02:25

## 2018-11-03 RX ADMIN — INSULIN LISPRO 5 UNITS: 100 INJECTION, SOLUTION INTRAVENOUS; SUBCUTANEOUS at 16:06

## 2018-11-03 RX ADMIN — PREGABALIN 200 MG: 100 CAPSULE ORAL at 08:11

## 2018-11-03 RX ADMIN — DULOXETINE HYDROCHLORIDE 120 MG: 60 CAPSULE, DELAYED RELEASE ORAL at 08:09

## 2018-11-03 RX ADMIN — SPIRONOLACTONE 50 MG: 25 TABLET ORAL at 08:09

## 2018-11-03 RX ADMIN — ATORVASTATIN CALCIUM 10 MG: 10 TABLET, FILM COATED ORAL at 08:10

## 2018-11-03 RX ADMIN — Medication 10 ML: at 08:35

## 2018-11-03 RX ADMIN — Medication 10 ML: at 08:14

## 2018-11-03 RX ADMIN — HYDROCODONE BITARTRATE AND ACETAMINOPHEN 2 TABLET: 5; 325 TABLET ORAL at 06:41

## 2018-11-03 RX ADMIN — PETROLATUM: 42 OINTMENT TOPICAL at 10:35

## 2018-11-03 ASSESSMENT — PAIN SCALES - GENERAL
PAINLEVEL_OUTOF10: 10
PAINLEVEL_OUTOF10: 6
PAINLEVEL_OUTOF10: 7
PAINLEVEL_OUTOF10: 10
PAINLEVEL_OUTOF10: 7
PAINLEVEL_OUTOF10: 10

## 2018-11-03 ASSESSMENT — PAIN DESCRIPTION - LOCATION
LOCATION: LEG

## 2018-11-03 ASSESSMENT — PAIN DESCRIPTION - ONSET
ONSET: ON-GOING

## 2018-11-03 ASSESSMENT — PAIN DESCRIPTION - DESCRIPTORS
DESCRIPTORS: ACHING;CONSTANT;SHARP
DESCRIPTORS: ACHING;CONSTANT
DESCRIPTORS: ACHING;CONSTANT;SHARP

## 2018-11-03 ASSESSMENT — PAIN DESCRIPTION - FREQUENCY
FREQUENCY: CONTINUOUS

## 2018-11-03 ASSESSMENT — PAIN DESCRIPTION - PAIN TYPE
TYPE: SURGICAL PAIN

## 2018-11-03 ASSESSMENT — PAIN DESCRIPTION - ORIENTATION
ORIENTATION: RIGHT

## 2018-11-03 NOTE — PROGRESS NOTES
ID Progress Note                [address] [facility] [address], [place], [address] [facility]            Phone [phone]     Fax [phone]      C/C : MRSA bacteremia, right foot wound infection     Pt is awake and alert  Denies fever and chills  Reports  Pain in the foot   Afebrile   s/pAKA  Scheduled Meds:   sennosides-docusate sodium  2 tablet Oral BID    insulin lispro  5 Units Subcutaneous TID WC    morphine  15 mg Oral 2 times per day    heparin (porcine)  5,000 Units Subcutaneous 3 times per day    mineral oil-hydrophilic petrolatum   Topical BID    insulin lispro  0-18 Units Subcutaneous TID WC    lidocaine 1 % injection  5 mL Intradermal Once    sodium chloride flush  10 mL Intravenous 2 times per day    lidocaine 1 % injection  5 mL Intradermal Once    ferrous sulfate  325 mg Oral BID WC    daptomycin (CUBICIN) IVPB  500 mg Intravenous Q24H    aspirin  81 mg Oral Daily    amLODIPine  10 mg Oral QAM    atorvastatin  10 mg Oral Daily    cilostazol  100 mg Oral BID    cloNIDine  0.2 mg Oral TID    DULoxetine  120 mg Oral QAM    pantoprazole  40 mg Oral QAM AC    insulin glargine  15 Units Subcutaneous BID    lisinopril  10 mg Oral Daily    metoprolol  100 mg Oral BID    pregabalin  200 mg Oral TID    spironolactone  50 mg Oral QAM    sodium chloride flush  10 mL Intravenous 2 times per day     Continuous Infusions:   dextrose       PRN Meds:. HYDROcodone 5 mg - acetaminophen **OR** HYDROcodone 5 mg - acetaminophen, sodium chloride flush, LORazepam, tiZANidine, magnesium hydroxide, ondansetron, glucose, dextrose, glucagon (rDNA), dextrose    ROS :     10 ROS otherwise negative unless otherwise specified above. Objective:    BP (!) 82/44   Pulse 72   Temp 98.6 °F (37 °C) (Oral)   Resp 20   Ht 6' 2\" (1.88 m)   Wt (!) 320 lb 11.2 oz (145.5 kg)   SpO2 91%   BMI 41.18 kg/m²     General Appearance:    Awake, alert , no acute distress.    HEENT:    Normocephalic,PERRL,neck supple, no JVD, mucosa moist, no thrush   Lungs:     Clear to auscultation bilaterally, no wheeze , crackles   Heart:    Regular rate and rhythm, no murmur   Abdomen:     Soft, non-tender, not distended  bowel sounds present,   Extremities:   No edema,no open wound,no erythema, non  tender   Skin:  right foot wound , dry , some odor, pulse not palpable      Labs:  Recent Labs      11/01/18   0540  11/02/18   1000   WBC  9.7  17.8*   RBC  3.72*  4.29   HGB  7.8*  9.7*   HCT  26.3*  31.4*   MCV  70.7*  73.2*   MCH  21.0*  22.6*   MCHC  29.7*  30.9*   RDW  17.5*  19.7*   PLT  227  252   MPV  11.2  10.8     CMP:    Lab Results   Component Value Date     11/02/2018    K 4.5 11/02/2018    CL 98 11/02/2018    CO2 28 11/02/2018    BUN 17 11/02/2018    CREATININE 1.5 11/02/2018    GFRAA 58 11/02/2018    LABGLOM 58 11/02/2018    GLUCOSE 190 11/02/2018    PROT 7.2 10/28/2018    LABALBU 2.9 10/28/2018    CALCIUM 9.0 11/02/2018    BILITOT 0.3 10/28/2018    ALKPHOS 105 10/28/2018    AST 14 10/28/2018    ALT 20 10/28/2018          Microbiology :      Culture & Susceptibility     STAPHYLOCOCCUS AUREUS     Antibiotic Interpretation [place] Unit   DAPTOmycin Sensitive =^0.25 mcg/mL   clindamycin Resistant >=^4 mcg/mL   doxycycline Intermediate =^8 mcg/mL   erythromycin Resistant >=^8 mcg/mL   gentamicin Resistant >=^16 mcg/mL   oxacillin Resistant >=^4 mcg/mL   tigecycline Sensitive =^0.5 mcg/mL   trimethoprim-sulfamethoxazole Sensitive <=^10 mcg/mL   vancomycin Sensitive <=^0.5 mcg/mL         Lab and Collection         Radiology :  Bone scan          Impression:       1. Abnormal right midfoot multiphase scintigraphic findings consistent  with ongoing osteomyelitis here under appropriate clinical  circumstances. Clinical and possible updated radiographic correlation  recommended.   2. Abnormal medial left forefoot multiphase scintigraphic findings,  suggestive of possible ongoing osteomyelitis here also, under  appropriate clinical

## 2018-11-04 LAB
ANION GAP SERPL CALCULATED.3IONS-SCNC: 10 MMOL/L (ref 7–16)
BASOPHILS ABSOLUTE: 0.04 E9/L (ref 0–0.2)
BASOPHILS RELATIVE PERCENT: 0.3 % (ref 0–2)
BUN BLDV-MCNC: 24 MG/DL (ref 8–23)
CALCIUM SERPL-MCNC: 8.7 MG/DL (ref 8.6–10.2)
CHLORIDE BLD-SCNC: 101 MMOL/L (ref 98–107)
CO2: 27 MMOL/L (ref 22–29)
CREAT SERPL-MCNC: 1.6 MG/DL (ref 0.7–1.2)
EOSINOPHILS ABSOLUTE: 0.14 E9/L (ref 0.05–0.5)
EOSINOPHILS RELATIVE PERCENT: 1 % (ref 0–6)
GFR AFRICAN AMERICAN: 53
GFR NON-AFRICAN AMERICAN: 53 ML/MIN/1.73
GLUCOSE BLD-MCNC: 140 MG/DL (ref 74–109)
HCT VFR BLD CALC: 27 % (ref 37–54)
HEMOGLOBIN: 8.2 G/DL (ref 12.5–16.5)
IMMATURE GRANULOCYTES #: 0.12 E9/L
IMMATURE GRANULOCYTES %: 0.9 % (ref 0–5)
LYMPHOCYTES ABSOLUTE: 2.7 E9/L (ref 1.5–4)
LYMPHOCYTES RELATIVE PERCENT: 19.8 % (ref 20–42)
MCH RBC QN AUTO: 22.5 PG (ref 26–35)
MCHC RBC AUTO-ENTMCNC: 30.4 % (ref 32–34.5)
MCV RBC AUTO: 74.2 FL (ref 80–99.9)
METER GLUCOSE: 162 MG/DL (ref 70–110)
METER GLUCOSE: 183 MG/DL (ref 70–110)
METER GLUCOSE: 232 MG/DL (ref 70–110)
METER GLUCOSE: 239 MG/DL (ref 70–110)
MONOCYTES ABSOLUTE: 0.93 E9/L (ref 0.1–0.95)
MONOCYTES RELATIVE PERCENT: 6.8 % (ref 2–12)
NEUTROPHILS ABSOLUTE: 9.74 E9/L (ref 1.8–7.3)
NEUTROPHILS RELATIVE PERCENT: 71.2 % (ref 43–80)
PDW BLD-RTO: 19.9 FL (ref 11.5–15)
PLATELET # BLD: 204 E9/L (ref 130–450)
PMV BLD AUTO: 11.1 FL (ref 7–12)
POTASSIUM REFLEX MAGNESIUM: 4.1 MMOL/L (ref 3.5–5)
RBC # BLD: 3.64 E12/L (ref 3.8–5.8)
SODIUM BLD-SCNC: 138 MMOL/L (ref 132–146)
WBC # BLD: 13.7 E9/L (ref 4.5–11.5)

## 2018-11-04 PROCEDURE — 6370000000 HC RX 637 (ALT 250 FOR IP): Performed by: FAMILY MEDICINE

## 2018-11-04 PROCEDURE — 6360000002 HC RX W HCPCS: Performed by: INTERNAL MEDICINE

## 2018-11-04 PROCEDURE — 6370000000 HC RX 637 (ALT 250 FOR IP): Performed by: INTERNAL MEDICINE

## 2018-11-04 PROCEDURE — 80048 BASIC METABOLIC PNL TOTAL CA: CPT

## 2018-11-04 PROCEDURE — 2580000003 HC RX 258: Performed by: FAMILY MEDICINE

## 2018-11-04 PROCEDURE — 85025 COMPLETE CBC W/AUTO DIFF WBC: CPT

## 2018-11-04 PROCEDURE — 82962 GLUCOSE BLOOD TEST: CPT

## 2018-11-04 PROCEDURE — 2580000003 HC RX 258: Performed by: INTERNAL MEDICINE

## 2018-11-04 PROCEDURE — 36415 COLL VENOUS BLD VENIPUNCTURE: CPT

## 2018-11-04 PROCEDURE — 1200000000 HC SEMI PRIVATE

## 2018-11-04 PROCEDURE — 6370000000 HC RX 637 (ALT 250 FOR IP): Performed by: STUDENT IN AN ORGANIZED HEALTH CARE EDUCATION/TRAINING PROGRAM

## 2018-11-04 PROCEDURE — 6360000002 HC RX W HCPCS: Performed by: STUDENT IN AN ORGANIZED HEALTH CARE EDUCATION/TRAINING PROGRAM

## 2018-11-04 RX ORDER — 0.9 % SODIUM CHLORIDE 0.9 %
1000 INTRAVENOUS SOLUTION INTRAVENOUS ONCE
Status: COMPLETED | OUTPATIENT
Start: 2018-11-04 | End: 2018-11-05

## 2018-11-04 RX ADMIN — Medication 10 ML: at 10:50

## 2018-11-04 RX ADMIN — HEPARIN SODIUM 5000 UNITS: 10000 INJECTION INTRAVENOUS; SUBCUTANEOUS at 05:53

## 2018-11-04 RX ADMIN — FERROUS SULFATE TAB 325 MG (65 MG ELEMENTAL FE) 325 MG: 325 (65 FE) TAB at 16:28

## 2018-11-04 RX ADMIN — INSULIN LISPRO 3 UNITS: 100 INJECTION, SOLUTION INTRAVENOUS; SUBCUTANEOUS at 12:04

## 2018-11-04 RX ADMIN — Medication 10 ML: at 09:24

## 2018-11-04 RX ADMIN — INSULIN GLARGINE 15 UNITS: 100 INJECTION, SOLUTION SUBCUTANEOUS at 09:25

## 2018-11-04 RX ADMIN — CILOSTAZOL 100 MG: 100 TABLET ORAL at 20:16

## 2018-11-04 RX ADMIN — INSULIN LISPRO 3 UNITS: 100 INJECTION, SOLUTION INTRAVENOUS; SUBCUTANEOUS at 07:27

## 2018-11-04 RX ADMIN — SENNOSIDES AND DOCUSATE SODIUM 2 TABLET: 8.6; 5 TABLET ORAL at 09:24

## 2018-11-04 RX ADMIN — MORPHINE SULFATE 15 MG: 15 TABLET, FILM COATED, EXTENDED RELEASE ORAL at 20:16

## 2018-11-04 RX ADMIN — METOPROLOL TARTRATE 100 MG: 50 TABLET, FILM COATED ORAL at 20:16

## 2018-11-04 RX ADMIN — FERROUS SULFATE TAB 325 MG (65 MG ELEMENTAL FE) 325 MG: 325 (65 FE) TAB at 07:27

## 2018-11-04 RX ADMIN — PREGABALIN 200 MG: 100 CAPSULE ORAL at 14:13

## 2018-11-04 RX ADMIN — HEPARIN SODIUM 5000 UNITS: 10000 INJECTION INTRAVENOUS; SUBCUTANEOUS at 20:18

## 2018-11-04 RX ADMIN — INSULIN LISPRO 5 UNITS: 100 INJECTION, SOLUTION INTRAVENOUS; SUBCUTANEOUS at 16:50

## 2018-11-04 RX ADMIN — PETROLATUM: 42 OINTMENT TOPICAL at 09:25

## 2018-11-04 RX ADMIN — INSULIN LISPRO 6 UNITS: 100 INJECTION, SOLUTION INTRAVENOUS; SUBCUTANEOUS at 16:52

## 2018-11-04 RX ADMIN — PREGABALIN 200 MG: 100 CAPSULE ORAL at 20:16

## 2018-11-04 RX ADMIN — HYDROCODONE BITARTRATE AND ACETAMINOPHEN 2 TABLET: 5; 325 TABLET ORAL at 04:24

## 2018-11-04 RX ADMIN — SODIUM CHLORIDE 1000 ML: 9 INJECTION, SOLUTION INTRAVENOUS at 12:01

## 2018-11-04 RX ADMIN — HYDROCODONE BITARTRATE AND ACETAMINOPHEN 2 TABLET: 5; 325 TABLET ORAL at 22:44

## 2018-11-04 RX ADMIN — SENNOSIDES AND DOCUSATE SODIUM 2 TABLET: 8.6; 5 TABLET ORAL at 20:17

## 2018-11-04 RX ADMIN — HYDROCODONE BITARTRATE AND ACETAMINOPHEN 2 TABLET: 5; 325 TABLET ORAL at 00:26

## 2018-11-04 RX ADMIN — CLONIDINE HYDROCHLORIDE 0.2 MG: 0.2 TABLET ORAL at 20:16

## 2018-11-04 RX ADMIN — INSULIN GLARGINE 15 UNITS: 100 INJECTION, SOLUTION SUBCUTANEOUS at 20:18

## 2018-11-04 RX ADMIN — ATORVASTATIN CALCIUM 10 MG: 10 TABLET, FILM COATED ORAL at 09:24

## 2018-11-04 RX ADMIN — PANTOPRAZOLE SODIUM 40 MG: 40 TABLET, DELAYED RELEASE ORAL at 05:54

## 2018-11-04 RX ADMIN — INSULIN LISPRO 5 UNITS: 100 INJECTION, SOLUTION INTRAVENOUS; SUBCUTANEOUS at 07:29

## 2018-11-04 RX ADMIN — HYDROCODONE BITARTRATE AND ACETAMINOPHEN 2 TABLET: 5; 325 TABLET ORAL at 10:57

## 2018-11-04 RX ADMIN — AMLODIPINE BESYLATE 10 MG: 10 TABLET ORAL at 09:24

## 2018-11-04 RX ADMIN — INSULIN LISPRO 5 UNITS: 100 INJECTION, SOLUTION INTRAVENOUS; SUBCUTANEOUS at 12:07

## 2018-11-04 RX ADMIN — DAPTOMYCIN 500 MG: 500 INJECTION, POWDER, LYOPHILIZED, FOR SOLUTION INTRAVENOUS at 10:50

## 2018-11-04 RX ADMIN — MORPHINE SULFATE 15 MG: 15 TABLET, FILM COATED, EXTENDED RELEASE ORAL at 09:22

## 2018-11-04 RX ADMIN — SPIRONOLACTONE 50 MG: 25 TABLET ORAL at 09:23

## 2018-11-04 RX ADMIN — HYDROCODONE BITARTRATE AND ACETAMINOPHEN 2 TABLET: 5; 325 TABLET ORAL at 15:26

## 2018-11-04 RX ADMIN — DULOXETINE HYDROCHLORIDE 120 MG: 60 CAPSULE, DELAYED RELEASE ORAL at 09:24

## 2018-11-04 RX ADMIN — LISINOPRIL 10 MG: 10 TABLET ORAL at 09:24

## 2018-11-04 RX ADMIN — CILOSTAZOL 100 MG: 100 TABLET ORAL at 09:24

## 2018-11-04 RX ADMIN — PREGABALIN 200 MG: 100 CAPSULE ORAL at 09:23

## 2018-11-04 RX ADMIN — ASPIRIN 81 MG CHEWABLE TABLET 81 MG: 81 TABLET CHEWABLE at 09:22

## 2018-11-04 RX ADMIN — HEPARIN SODIUM 5000 UNITS: 10000 INJECTION INTRAVENOUS; SUBCUTANEOUS at 14:08

## 2018-11-04 ASSESSMENT — PAIN DESCRIPTION - FREQUENCY
FREQUENCY: CONTINUOUS

## 2018-11-04 ASSESSMENT — PAIN SCALES - GENERAL
PAINLEVEL_OUTOF10: 10
PAINLEVEL_OUTOF10: 9
PAINLEVEL_OUTOF10: 7
PAINLEVEL_OUTOF10: 9
PAINLEVEL_OUTOF10: 8
PAINLEVEL_OUTOF10: 6
PAINLEVEL_OUTOF10: 9
PAINLEVEL_OUTOF10: 9

## 2018-11-04 ASSESSMENT — PAIN DESCRIPTION - PROGRESSION
CLINICAL_PROGRESSION: GRADUALLY WORSENING
CLINICAL_PROGRESSION: GRADUALLY IMPROVING

## 2018-11-04 ASSESSMENT — PAIN DESCRIPTION - ONSET
ONSET: ON-GOING

## 2018-11-04 ASSESSMENT — PAIN DESCRIPTION - PAIN TYPE
TYPE: SURGICAL PAIN

## 2018-11-04 ASSESSMENT — PAIN DESCRIPTION - ORIENTATION
ORIENTATION: RIGHT

## 2018-11-04 ASSESSMENT — PAIN DESCRIPTION - LOCATION
LOCATION: LEG

## 2018-11-04 ASSESSMENT — PAIN DESCRIPTION - DESCRIPTORS
DESCRIPTORS: ACHING;DISCOMFORT

## 2018-11-05 ENCOUNTER — HOSPITAL ENCOUNTER (INPATIENT)
Age: 63
LOS: 9 days | Discharge: HOME | DRG: 561 | End: 2018-11-14
Attending: PHYSICAL MEDICINE & REHABILITATION | Admitting: PHYSICAL MEDICINE & REHABILITATION
Payer: MEDICARE

## 2018-11-05 VITALS
OXYGEN SATURATION: 93 % | WEIGHT: 315 LBS | SYSTOLIC BLOOD PRESSURE: 134 MMHG | HEART RATE: 74 BPM | DIASTOLIC BLOOD PRESSURE: 62 MMHG | BODY MASS INDEX: 40.43 KG/M2 | RESPIRATION RATE: 18 BRPM | TEMPERATURE: 97.6 F | HEIGHT: 74 IN

## 2018-11-05 DIAGNOSIS — S78.111A ABOVE KNEE AMPUTATION OF RIGHT LOWER EXTREMITY (HCC): Primary | ICD-10-CM

## 2018-11-05 LAB
ANISOCYTOSIS: ABNORMAL
BASOPHILS ABSOLUTE: 0.03 E9/L (ref 0–0.2)
BASOPHILS RELATIVE PERCENT: 0.3 % (ref 0–2)
EOSINOPHILS ABSOLUTE: 0.36 E9/L (ref 0.05–0.5)
EOSINOPHILS RELATIVE PERCENT: 3 % (ref 0–6)
HCT VFR BLD CALC: 25.7 % (ref 37–54)
HEMOGLOBIN: 7.8 G/DL (ref 12.5–16.5)
HYPOCHROMIA: ABNORMAL
IMMATURE GRANULOCYTES #: 0.08 E9/L
IMMATURE GRANULOCYTES %: 0.7 % (ref 0–5)
LYMPHOCYTES ABSOLUTE: 2.32 E9/L (ref 1.5–4)
LYMPHOCYTES RELATIVE PERCENT: 19.4 % (ref 20–42)
MCH RBC QN AUTO: 22.2 PG (ref 26–35)
MCHC RBC AUTO-ENTMCNC: 30.4 % (ref 32–34.5)
MCV RBC AUTO: 73 FL (ref 80–99.9)
METER GLUCOSE: 172 MG/DL (ref 74–99)
METER GLUCOSE: 200 MG/DL (ref 74–99)
METER GLUCOSE: 224 MG/DL (ref 74–99)
METER GLUCOSE: 341 MG/DL (ref 74–99)
MONOCYTES ABSOLUTE: 0.86 E9/L (ref 0.1–0.95)
MONOCYTES RELATIVE PERCENT: 7.2 % (ref 2–12)
NEUTROPHILS ABSOLUTE: 8.32 E9/L (ref 1.8–7.3)
NEUTROPHILS RELATIVE PERCENT: 69.4 % (ref 43–80)
PDW BLD-RTO: 20.2 FL (ref 11.5–15)
PLATELET # BLD: 215 E9/L (ref 130–450)
PMV BLD AUTO: 11.4 FL (ref 7–12)
POLYCHROMASIA: ABNORMAL
RBC # BLD: 3.52 E12/L (ref 3.8–5.8)
WBC # BLD: 12 E9/L (ref 4.5–11.5)

## 2018-11-05 PROCEDURE — 2580000003 HC RX 258: Performed by: INTERNAL MEDICINE

## 2018-11-05 PROCEDURE — 36415 COLL VENOUS BLD VENIPUNCTURE: CPT

## 2018-11-05 PROCEDURE — 6370000000 HC RX 637 (ALT 250 FOR IP): Performed by: FAMILY MEDICINE

## 2018-11-05 PROCEDURE — 6370000000 HC RX 637 (ALT 250 FOR IP): Performed by: INTERNAL MEDICINE

## 2018-11-05 PROCEDURE — 82962 GLUCOSE BLOOD TEST: CPT

## 2018-11-05 PROCEDURE — 1280000000 HC REHAB R&B

## 2018-11-05 PROCEDURE — 6360000002 HC RX W HCPCS: Performed by: INTERNAL MEDICINE

## 2018-11-05 PROCEDURE — 97535 SELF CARE MNGMENT TRAINING: CPT

## 2018-11-05 PROCEDURE — 97530 THERAPEUTIC ACTIVITIES: CPT

## 2018-11-05 PROCEDURE — 6370000000 HC RX 637 (ALT 250 FOR IP): Performed by: STUDENT IN AN ORGANIZED HEALTH CARE EDUCATION/TRAINING PROGRAM

## 2018-11-05 PROCEDURE — 85025 COMPLETE CBC W/AUTO DIFF WBC: CPT

## 2018-11-05 PROCEDURE — 6360000002 HC RX W HCPCS: Performed by: STUDENT IN AN ORGANIZED HEALTH CARE EDUCATION/TRAINING PROGRAM

## 2018-11-05 RX ORDER — DEXTROSE MONOHYDRATE 50 MG/ML
100 INJECTION, SOLUTION INTRAVENOUS PRN
Status: DISCONTINUED | OUTPATIENT
Start: 2018-11-05 | End: 2018-11-14 | Stop reason: HOSPADM

## 2018-11-05 RX ORDER — SENNA AND DOCUSATE SODIUM 50; 8.6 MG/1; MG/1
2 TABLET, FILM COATED ORAL 2 TIMES DAILY
Status: DISCONTINUED | OUTPATIENT
Start: 2018-11-05 | End: 2018-11-14 | Stop reason: HOSPADM

## 2018-11-05 RX ORDER — HYDROCODONE BITARTRATE AND ACETAMINOPHEN 5; 325 MG/1; MG/1
2 TABLET ORAL EVERY 4 HOURS PRN
Status: CANCELLED | OUTPATIENT
Start: 2018-11-05

## 2018-11-05 RX ORDER — ASPIRIN 81 MG/1
81 TABLET, CHEWABLE ORAL DAILY
Status: CANCELLED | OUTPATIENT
Start: 2018-11-06

## 2018-11-05 RX ORDER — SODIUM CHLORIDE 0.9 % (FLUSH) 0.9 %
10 SYRINGE (ML) INJECTION EVERY 12 HOURS SCHEDULED
Status: DISCONTINUED | OUTPATIENT
Start: 2018-11-05 | End: 2018-11-10

## 2018-11-05 RX ORDER — DEXTROSE MONOHYDRATE 25 G/50ML
12.5 INJECTION, SOLUTION INTRAVENOUS PRN
Status: DISCONTINUED | OUTPATIENT
Start: 2018-11-05 | End: 2018-11-14 | Stop reason: HOSPADM

## 2018-11-05 RX ORDER — CLONIDINE HYDROCHLORIDE 0.2 MG/1
0.2 TABLET ORAL 3 TIMES DAILY
Status: DISCONTINUED | OUTPATIENT
Start: 2018-11-05 | End: 2018-11-06

## 2018-11-05 RX ORDER — AMLODIPINE BESYLATE 10 MG/1
10 TABLET ORAL EVERY MORNING
Status: DISCONTINUED | OUTPATIENT
Start: 2018-11-06 | End: 2018-11-14 | Stop reason: HOSPADM

## 2018-11-05 RX ORDER — ATORVASTATIN CALCIUM 10 MG/1
10 TABLET, FILM COATED ORAL DAILY
Status: DISCONTINUED | OUTPATIENT
Start: 2018-11-06 | End: 2018-11-05 | Stop reason: ALTCHOICE

## 2018-11-05 RX ORDER — SENNA AND DOCUSATE SODIUM 50; 8.6 MG/1; MG/1
2 TABLET, FILM COATED ORAL 2 TIMES DAILY
Status: CANCELLED | OUTPATIENT
Start: 2018-11-05

## 2018-11-05 RX ORDER — CILOSTAZOL 100 MG/1
100 TABLET ORAL 2 TIMES DAILY
Status: DISCONTINUED | OUTPATIENT
Start: 2018-11-05 | End: 2018-11-14 | Stop reason: HOSPADM

## 2018-11-05 RX ORDER — SODIUM CHLORIDE 0.9 % (FLUSH) 0.9 %
10 SYRINGE (ML) INJECTION EVERY 12 HOURS SCHEDULED
Status: CANCELLED | OUTPATIENT
Start: 2018-11-05

## 2018-11-05 RX ORDER — METOPROLOL TARTRATE 50 MG/1
100 TABLET, FILM COATED ORAL 2 TIMES DAILY
Status: DISCONTINUED | OUTPATIENT
Start: 2018-11-05 | End: 2018-11-14 | Stop reason: HOSPADM

## 2018-11-05 RX ORDER — ASPIRIN 81 MG/1
81 TABLET, CHEWABLE ORAL DAILY
Status: DISCONTINUED | OUTPATIENT
Start: 2018-11-06 | End: 2018-11-14 | Stop reason: HOSPADM

## 2018-11-05 RX ORDER — METOPROLOL TARTRATE 50 MG/1
100 TABLET, FILM COATED ORAL 2 TIMES DAILY
Status: CANCELLED | OUTPATIENT
Start: 2018-11-05

## 2018-11-05 RX ORDER — LORAZEPAM 1 MG/1
1 TABLET ORAL NIGHTLY PRN
Status: DISCONTINUED | OUTPATIENT
Start: 2018-11-05 | End: 2018-11-14 | Stop reason: HOSPADM

## 2018-11-05 RX ORDER — LISINOPRIL 10 MG/1
10 TABLET ORAL DAILY
Status: CANCELLED | OUTPATIENT
Start: 2018-11-06

## 2018-11-05 RX ORDER — PETROLATUM 42 G/100G
OINTMENT TOPICAL 2 TIMES DAILY
Status: CANCELLED | OUTPATIENT
Start: 2018-11-05

## 2018-11-05 RX ORDER — AMLODIPINE BESYLATE 10 MG/1
10 TABLET ORAL EVERY MORNING
Status: CANCELLED | OUTPATIENT
Start: 2018-11-06

## 2018-11-05 RX ORDER — HYDROCODONE BITARTRATE AND ACETAMINOPHEN 5; 325 MG/1; MG/1
1 TABLET ORAL EVERY 4 HOURS PRN
Status: CANCELLED | OUTPATIENT
Start: 2018-11-05

## 2018-11-05 RX ORDER — ONDANSETRON 2 MG/ML
4 INJECTION INTRAMUSCULAR; INTRAVENOUS EVERY 6 HOURS PRN
Status: CANCELLED | OUTPATIENT
Start: 2018-11-05

## 2018-11-05 RX ORDER — ACETAMINOPHEN 325 MG/1
650 TABLET ORAL EVERY 4 HOURS PRN
Status: DISCONTINUED | OUTPATIENT
Start: 2018-11-05 | End: 2018-11-14 | Stop reason: HOSPADM

## 2018-11-05 RX ORDER — PREGABALIN 100 MG/1
200 CAPSULE ORAL 3 TIMES DAILY
Status: CANCELLED | OUTPATIENT
Start: 2018-11-05

## 2018-11-05 RX ORDER — SPIRONOLACTONE 25 MG/1
50 TABLET ORAL EVERY MORNING
Status: CANCELLED | OUTPATIENT
Start: 2018-11-06

## 2018-11-05 RX ORDER — PETROLATUM 42 G/100G
OINTMENT TOPICAL 2 TIMES DAILY
Status: DISCONTINUED | OUTPATIENT
Start: 2018-11-05 | End: 2018-11-14 | Stop reason: HOSPADM

## 2018-11-05 RX ORDER — MORPHINE SULFATE 15 MG/1
15 TABLET, FILM COATED, EXTENDED RELEASE ORAL EVERY 12 HOURS SCHEDULED
Status: DISCONTINUED | OUTPATIENT
Start: 2018-11-05 | End: 2018-11-14 | Stop reason: HOSPADM

## 2018-11-05 RX ORDER — SODIUM CHLORIDE 0.9 % (FLUSH) 0.9 %
10 SYRINGE (ML) INJECTION PRN
Status: CANCELLED | OUTPATIENT
Start: 2018-11-05

## 2018-11-05 RX ORDER — HYDROCODONE BITARTRATE AND ACETAMINOPHEN 5; 325 MG/1; MG/1
2 TABLET ORAL EVERY 4 HOURS PRN
Status: DISCONTINUED | OUTPATIENT
Start: 2018-11-05 | End: 2018-11-14 | Stop reason: HOSPADM

## 2018-11-05 RX ORDER — FERROUS SULFATE 325(65) MG
325 TABLET ORAL 2 TIMES DAILY WITH MEALS
Status: CANCELLED | OUTPATIENT
Start: 2018-11-05

## 2018-11-05 RX ORDER — NICOTINE POLACRILEX 4 MG
15 LOZENGE BUCCAL PRN
Status: DISCONTINUED | OUTPATIENT
Start: 2018-11-05 | End: 2018-11-14 | Stop reason: HOSPADM

## 2018-11-05 RX ORDER — PANTOPRAZOLE SODIUM 40 MG/1
40 TABLET, DELAYED RELEASE ORAL
Status: DISCONTINUED | OUTPATIENT
Start: 2018-11-06 | End: 2018-11-14 | Stop reason: HOSPADM

## 2018-11-05 RX ORDER — TIZANIDINE 4 MG/1
4 TABLET ORAL EVERY 8 HOURS PRN
Status: DISCONTINUED | OUTPATIENT
Start: 2018-11-05 | End: 2018-11-14 | Stop reason: HOSPADM

## 2018-11-05 RX ORDER — ATORVASTATIN CALCIUM 10 MG/1
10 TABLET, FILM COATED ORAL DAILY
Status: CANCELLED | OUTPATIENT
Start: 2018-11-06

## 2018-11-05 RX ORDER — SODIUM CHLORIDE 0.9 % (FLUSH) 0.9 %
10 SYRINGE (ML) INJECTION PRN
Status: DISCONTINUED | OUTPATIENT
Start: 2018-11-05 | End: 2018-11-10

## 2018-11-05 RX ORDER — HEPARIN SODIUM 10000 [USP'U]/ML
5000 INJECTION, SOLUTION INTRAVENOUS; SUBCUTANEOUS EVERY 8 HOURS SCHEDULED
Status: DISCONTINUED | OUTPATIENT
Start: 2018-11-05 | End: 2018-11-08

## 2018-11-05 RX ORDER — MORPHINE SULFATE 15 MG/1
15 TABLET, FILM COATED, EXTENDED RELEASE ORAL EVERY 12 HOURS SCHEDULED
Status: CANCELLED | OUTPATIENT
Start: 2018-11-05

## 2018-11-05 RX ORDER — NICOTINE POLACRILEX 4 MG
15 LOZENGE BUCCAL PRN
Status: CANCELLED | OUTPATIENT
Start: 2018-11-05

## 2018-11-05 RX ORDER — INSULIN GLARGINE 100 [IU]/ML
15 INJECTION, SOLUTION SUBCUTANEOUS 2 TIMES DAILY
Status: DISCONTINUED | OUTPATIENT
Start: 2018-11-05 | End: 2018-11-14 | Stop reason: HOSPADM

## 2018-11-05 RX ORDER — ONDANSETRON 2 MG/ML
4 INJECTION INTRAMUSCULAR; INTRAVENOUS EVERY 6 HOURS PRN
Status: DISCONTINUED | OUTPATIENT
Start: 2018-11-05 | End: 2018-11-14 | Stop reason: HOSPADM

## 2018-11-05 RX ORDER — SPIRONOLACTONE 25 MG/1
50 TABLET ORAL EVERY MORNING
Status: DISCONTINUED | OUTPATIENT
Start: 2018-11-06 | End: 2018-11-14 | Stop reason: HOSPADM

## 2018-11-05 RX ORDER — DEXTROSE MONOHYDRATE 25 G/50ML
12.5 INJECTION, SOLUTION INTRAVENOUS PRN
Status: CANCELLED | OUTPATIENT
Start: 2018-11-05

## 2018-11-05 RX ORDER — HYDROCODONE BITARTRATE AND ACETAMINOPHEN 5; 325 MG/1; MG/1
1 TABLET ORAL EVERY 4 HOURS PRN
Status: DISCONTINUED | OUTPATIENT
Start: 2018-11-05 | End: 2018-11-14 | Stop reason: HOSPADM

## 2018-11-05 RX ORDER — HEPARIN SODIUM 10000 [USP'U]/ML
5000 INJECTION, SOLUTION INTRAVENOUS; SUBCUTANEOUS EVERY 8 HOURS SCHEDULED
Status: CANCELLED | OUTPATIENT
Start: 2018-11-05

## 2018-11-05 RX ORDER — FERROUS SULFATE 325(65) MG
325 TABLET ORAL 2 TIMES DAILY WITH MEALS
Status: DISCONTINUED | OUTPATIENT
Start: 2018-11-05 | End: 2018-11-14 | Stop reason: HOSPADM

## 2018-11-05 RX ORDER — LISINOPRIL 10 MG/1
10 TABLET ORAL DAILY
Status: DISCONTINUED | OUTPATIENT
Start: 2018-11-06 | End: 2018-11-14 | Stop reason: HOSPADM

## 2018-11-05 RX ORDER — PREGABALIN 100 MG/1
200 CAPSULE ORAL 3 TIMES DAILY
Status: DISCONTINUED | OUTPATIENT
Start: 2018-11-05 | End: 2018-11-14 | Stop reason: HOSPADM

## 2018-11-05 RX ORDER — DULOXETIN HYDROCHLORIDE 60 MG/1
120 CAPSULE, DELAYED RELEASE ORAL EVERY MORNING
Status: CANCELLED | OUTPATIENT
Start: 2018-11-06

## 2018-11-05 RX ORDER — LORAZEPAM 1 MG/1
1 TABLET ORAL NIGHTLY PRN
Status: CANCELLED | OUTPATIENT
Start: 2018-11-05

## 2018-11-05 RX ORDER — PANTOPRAZOLE SODIUM 40 MG/1
40 TABLET, DELAYED RELEASE ORAL
Status: CANCELLED | OUTPATIENT
Start: 2018-11-06

## 2018-11-05 RX ORDER — CILOSTAZOL 100 MG/1
100 TABLET ORAL 2 TIMES DAILY
Status: CANCELLED | OUTPATIENT
Start: 2018-11-05

## 2018-11-05 RX ORDER — SODIUM CHLORIDE 0.9 % (FLUSH) 0.9 %
10 SYRINGE (ML) INJECTION EVERY 12 HOURS SCHEDULED
Status: DISCONTINUED | OUTPATIENT
Start: 2018-11-05 | End: 2018-11-05 | Stop reason: SDUPTHER

## 2018-11-05 RX ORDER — TIZANIDINE 4 MG/1
4 TABLET ORAL EVERY 8 HOURS PRN
Status: CANCELLED | OUTPATIENT
Start: 2018-11-05

## 2018-11-05 RX ORDER — CLONIDINE HYDROCHLORIDE 0.2 MG/1
0.2 TABLET ORAL 3 TIMES DAILY
Status: CANCELLED | OUTPATIENT
Start: 2018-11-05

## 2018-11-05 RX ORDER — DULOXETIN HYDROCHLORIDE 60 MG/1
120 CAPSULE, DELAYED RELEASE ORAL EVERY MORNING
Status: DISCONTINUED | OUTPATIENT
Start: 2018-11-06 | End: 2018-11-14 | Stop reason: HOSPADM

## 2018-11-05 RX ORDER — INSULIN GLARGINE 100 [IU]/ML
15 INJECTION, SOLUTION SUBCUTANEOUS 2 TIMES DAILY
Status: CANCELLED | OUTPATIENT
Start: 2018-11-05

## 2018-11-05 RX ORDER — DEXTROSE MONOHYDRATE 50 MG/ML
100 INJECTION, SOLUTION INTRAVENOUS PRN
Status: CANCELLED | OUTPATIENT
Start: 2018-11-05

## 2018-11-05 RX ADMIN — HEPARIN SODIUM 5000 UNITS: 10000 INJECTION INTRAVENOUS; SUBCUTANEOUS at 05:28

## 2018-11-05 RX ADMIN — HEPARIN SODIUM 5000 UNITS: 10000 INJECTION INTRAVENOUS; SUBCUTANEOUS at 21:47

## 2018-11-05 RX ADMIN — MORPHINE SULFATE 15 MG: 15 TABLET, FILM COATED, EXTENDED RELEASE ORAL at 09:57

## 2018-11-05 RX ADMIN — SPIRONOLACTONE 50 MG: 25 TABLET ORAL at 09:58

## 2018-11-05 RX ADMIN — SENNOSIDES AND DOCUSATE SODIUM 2 TABLET: 8.6; 5 TABLET ORAL at 09:56

## 2018-11-05 RX ADMIN — ATORVASTATIN CALCIUM 10 MG: 10 TABLET, FILM COATED ORAL at 09:59

## 2018-11-05 RX ADMIN — INSULIN LISPRO 5 UNITS: 100 INJECTION, SOLUTION INTRAVENOUS; SUBCUTANEOUS at 10:04

## 2018-11-05 RX ADMIN — PREGABALIN 200 MG: 100 CAPSULE ORAL at 09:56

## 2018-11-05 RX ADMIN — PETROLATUM: 42 OINTMENT TOPICAL at 22:04

## 2018-11-05 RX ADMIN — HYDROCODONE BITARTRATE AND ACETAMINOPHEN 2 TABLET: 5; 325 TABLET ORAL at 05:34

## 2018-11-05 RX ADMIN — INSULIN GLARGINE 15 UNITS: 100 INJECTION, SOLUTION SUBCUTANEOUS at 10:03

## 2018-11-05 RX ADMIN — HEPARIN SODIUM 5000 UNITS: 10000 INJECTION INTRAVENOUS; SUBCUTANEOUS at 13:33

## 2018-11-05 RX ADMIN — INSULIN LISPRO 5 UNITS: 100 INJECTION, SOLUTION INTRAVENOUS; SUBCUTANEOUS at 12:45

## 2018-11-05 RX ADMIN — Medication 10 ML: at 10:08

## 2018-11-05 RX ADMIN — PREGABALIN 200 MG: 100 CAPSULE ORAL at 13:31

## 2018-11-05 RX ADMIN — HYDROCODONE BITARTRATE AND ACETAMINOPHEN 2 TABLET: 5; 325 TABLET ORAL at 12:40

## 2018-11-05 RX ADMIN — FERROUS SULFATE TAB 325 MG (65 MG ELEMENTAL FE) 325 MG: 325 (65 FE) TAB at 21:52

## 2018-11-05 RX ADMIN — INSULIN LISPRO 6 UNITS: 100 INJECTION, SOLUTION INTRAVENOUS; SUBCUTANEOUS at 18:56

## 2018-11-05 RX ADMIN — INSULIN LISPRO 6 UNITS: 100 INJECTION, SOLUTION INTRAVENOUS; SUBCUTANEOUS at 10:03

## 2018-11-05 RX ADMIN — DAPTOMYCIN 500 MG: 500 INJECTION, POWDER, LYOPHILIZED, FOR SOLUTION INTRAVENOUS at 13:31

## 2018-11-05 RX ADMIN — LISINOPRIL 10 MG: 10 TABLET ORAL at 09:59

## 2018-11-05 RX ADMIN — CILOSTAZOL 100 MG: 100 TABLET ORAL at 09:59

## 2018-11-05 RX ADMIN — INSULIN GLARGINE 15 UNITS: 100 INJECTION, SOLUTION SUBCUTANEOUS at 21:54

## 2018-11-05 RX ADMIN — CLONIDINE HYDROCHLORIDE 0.2 MG: 0.2 TABLET ORAL at 13:31

## 2018-11-05 RX ADMIN — PANTOPRAZOLE SODIUM 40 MG: 40 TABLET, DELAYED RELEASE ORAL at 06:21

## 2018-11-05 RX ADMIN — MORPHINE SULFATE 15 MG: 15 TABLET, FILM COATED, EXTENDED RELEASE ORAL at 21:50

## 2018-11-05 RX ADMIN — PETROLATUM: 42 OINTMENT TOPICAL at 10:04

## 2018-11-05 RX ADMIN — CLONIDINE HYDROCHLORIDE 0.2 MG: 0.2 TABLET ORAL at 21:50

## 2018-11-05 RX ADMIN — SENNOSIDES AND DOCUSATE SODIUM 2 TABLET: 8.6; 5 TABLET ORAL at 21:51

## 2018-11-05 RX ADMIN — AMLODIPINE BESYLATE 10 MG: 10 TABLET ORAL at 10:02

## 2018-11-05 RX ADMIN — INSULIN LISPRO 5 UNITS: 100 INJECTION, SOLUTION INTRAVENOUS; SUBCUTANEOUS at 18:57

## 2018-11-05 RX ADMIN — DULOXETINE HYDROCHLORIDE 120 MG: 60 CAPSULE, DELAYED RELEASE ORAL at 09:58

## 2018-11-05 RX ADMIN — CLONIDINE HYDROCHLORIDE 0.2 MG: 0.2 TABLET ORAL at 09:57

## 2018-11-05 RX ADMIN — METOPROLOL TARTRATE 100 MG: 50 TABLET, FILM COATED ORAL at 09:58

## 2018-11-05 RX ADMIN — INSULIN LISPRO 3 UNITS: 100 INJECTION, SOLUTION INTRAVENOUS; SUBCUTANEOUS at 12:43

## 2018-11-05 RX ADMIN — HYDROCODONE BITARTRATE AND ACETAMINOPHEN 2 TABLET: 5; 325 TABLET ORAL at 18:46

## 2018-11-05 RX ADMIN — CILOSTAZOL 100 MG: 100 TABLET ORAL at 21:52

## 2018-11-05 RX ADMIN — PREGABALIN 200 MG: 100 CAPSULE ORAL at 21:47

## 2018-11-05 RX ADMIN — Medication 10 ML: at 22:00

## 2018-11-05 ASSESSMENT — PAIN SCALES - GENERAL
PAINLEVEL_OUTOF10: 9
PAINLEVEL_OUTOF10: 8
PAINLEVEL_OUTOF10: 6
PAINLEVEL_OUTOF10: 7
PAINLEVEL_OUTOF10: 0
PAINLEVEL_OUTOF10: 9
PAINLEVEL_OUTOF10: 9
PAINLEVEL_OUTOF10: 8
PAINLEVEL_OUTOF10: 6
PAINLEVEL_OUTOF10: 9
PAINLEVEL_OUTOF10: 9
PAINLEVEL_OUTOF10: 6

## 2018-11-05 ASSESSMENT — PAIN DESCRIPTION - ORIENTATION
ORIENTATION: RIGHT

## 2018-11-05 ASSESSMENT — PAIN DESCRIPTION - PAIN TYPE
TYPE: SURGICAL PAIN
TYPE: SURGICAL PAIN;CHRONIC PAIN
TYPE: SURGICAL PAIN

## 2018-11-05 ASSESSMENT — PAIN DESCRIPTION - FREQUENCY
FREQUENCY: CONTINUOUS
FREQUENCY: CONTINUOUS

## 2018-11-05 ASSESSMENT — PAIN DESCRIPTION - DESCRIPTORS
DESCRIPTORS: ACHING;SHARP;THROBBING;DISCOMFORT
DESCRIPTORS: ACHING;DISCOMFORT

## 2018-11-05 ASSESSMENT — PAIN DESCRIPTION - LOCATION
LOCATION: LEG
LOCATION: LEG
LOCATION: INCISION;LEG

## 2018-11-05 NOTE — PROGRESS NOTES
ID Progress Note                [address] [facility] [address], [place], [address] [facility]            Phone [phone]     Fax [phone]      C/C : MRSA bacteremia, right foot wound infection     Pt is awake and alert  Denies fever and chills  Reports  Pain in the foot   Afebrile   S/p AKA  Pain over surgical site  Scheduled Meds:   sennosides-docusate sodium  2 tablet Oral BID    insulin lispro  5 Units Subcutaneous TID WC    morphine  15 mg Oral 2 times per day    heparin (porcine)  5,000 Units Subcutaneous 3 times per day    mineral oil-hydrophilic petrolatum   Topical BID    insulin lispro  0-18 Units Subcutaneous TID WC    lidocaine 1 % injection  5 mL Intradermal Once    sodium chloride flush  10 mL Intravenous 2 times per day    lidocaine 1 % injection  5 mL Intradermal Once    ferrous sulfate  325 mg Oral BID WC    daptomycin (CUBICIN) IVPB  500 mg Intravenous Q24H    aspirin  81 mg Oral Daily    amLODIPine  10 mg Oral QAM    atorvastatin  10 mg Oral Daily    cilostazol  100 mg Oral BID    cloNIDine  0.2 mg Oral TID    DULoxetine  120 mg Oral QAM    pantoprazole  40 mg Oral QAM AC    insulin glargine  15 Units Subcutaneous BID    lisinopril  10 mg Oral Daily    metoprolol  100 mg Oral BID    pregabalin  200 mg Oral TID    spironolactone  50 mg Oral QAM    sodium chloride flush  10 mL Intravenous 2 times per day     Continuous Infusions:   dextrose       PRN Meds:. HYDROcodone 5 mg - acetaminophen **OR** HYDROcodone 5 mg - acetaminophen, sodium chloride flush, LORazepam, tiZANidine, magnesium hydroxide, ondansetron, glucose, dextrose, glucagon (rDNA), dextrose    ROS :     10 ROS otherwise negative unless otherwise specified above. Objective:    /82   Pulse 67   Temp 99.7 °F (37.6 °C) (Temporal)   Resp 18   Ht 6' 2\" (1.88 m)   Wt (!) 320 lb 11.2 oz (145.5 kg)   SpO2 95%   BMI 41.18 kg/m²     General Appearance:    Awake, alert , no acute distress.    HEENT:

## 2018-11-05 NOTE — PROGRESS NOTES
OT BEDSIDE TREATMENT NOTE      Date:2018  Patient Name: Yessy Moya  MRN: [id number]  : 1955  Room: [address] [place] [address]     Per OT Eval:   Evaluating OT: Maria Esther Carvajal OTR/L #[id number]     AM-PAC Daily Activity Raw Score: 15/24  G-Code 8987: CK  Recommended Adaptive Equipment:  TBD      Diagnosis: R AKA  1. Gangrene of right foot ([facility]    2. Osteomyelitis of right foot, unspecified type [facility])       Surgery: R AKA 18  Pertinent Medical History: DM, DVT     Precautions:  Falls, , R AKA, catheter     Home Living: Pt lives alone in an apt with 10 step(s) to enter and 1 rail(s); bed/bath on main   Bathroom setup: tub shower combo  Equipment owned: cane  Prior Level of Function: Independent  with ADLs , Independent  with IADLs; using cane for ambulation.    Driving: yes  Occupation: not working    Pain: 10/10 pain before, pain medication was just given prior to treatment    Cognition: Alert & oriented x 4, pleasant & cooperative, highly motivated    Functional Assessment:    Initial Eval Status  Date: 18 Treatment Status  Date:  18 Short Term Goals  Treatment frequency: PRN 1-3 x/week   Feeding Independent        Grooming Stand by Assist  Set up  Seated at EOB Modified [place]    UB Dressing Minimal Assist  Set up  Seated at EOB Stand by Assist    LB Dressing Maximal Assist  Max A  Thee R sock, limited functional reach  Moderate Assist    Bathing Maximal Assist NT  Minimal Assist    Toileting Maximal Assist  Dep  Catheter present, will progress towards BSC transfer   Minimal Assist    Bed Mobility  Supine to sit: Stand by Assist   Sit to supine: Stand by Assist  Mod A  Pt able to bring his trunk upright & manage his L LE but required Mod A to scoot to EOB, after instruction on weight shifting towards his L to move his R hip forward Supine to sit: Supervision   Sit to supine: Supervision    Functional Transfers Sit to stand: NT   Stand to sit: NT   Stand pivot: NT  Mod A x 2  From elevated

## 2018-11-05 NOTE — PROGRESS NOTES
Patient oriented to room and new admission folder given. Patient Guide reviewed and explanation of Patient Rights and Responsibilities completed. I gave a signed copy of The Important Message From Medicare to patient.  Scooter Dalton  11/5/2018  5:42 PM

## 2018-11-05 NOTE — LETTER
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)    Coagulopathy (HCC)    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 2 diabetes mellitus, with fat layer exposed ([facility]    Moderate protein-calorie malnutrition (HCC)    Cellulitis and abscess of toe of right foot    PVD (peripheral vascular disease) (HCC)    Absent foot pulse    Ulcer of right lower extremity (HCC)    Leukocytosis    Microcytic anemia    Stage 3 chronic kidney disease (HCC)    Morbid obesity with BMI of 40.0-44.9, adult ([facility]    Essential hypertension    Tobacco dependence    Uncontrolled diabetes mellitus with hyperglycemia (HCC)    HLD (hyperlipidemia)    History of DVT (deep vein thrombosis)    Anticoagulated    Osteomyelitis (HCC)    Chronic ulcer of right foot with fat layer exposed ([facility]    Staphylococcus aureus bacteremia without sepsis    Above knee amputation of right lower extremity ([facility]       INSURANCE INFORMATION:  Payor: MEDICARE /  /  /     ADVANCED DIRECTIVES:   Advance Directives (For Healthcare)  Pre-existing DNR Comfort Care/DNR Arrest/DNI Order: No  Healthcare Directive: Yes, patient has an advance directive for healthcare treatment  Type of Healthcare Directive: Living will, Durable power of  for health care  Copy in Chart: No, copy requested from family  Healthcare Agent Appointed: Adult [place] Agent's Name: [id number] Agent's Phone Number: Daughter in [place]   If you are unable to speak for yourself, does your Healthcare Agent or Legal Spokesperson know your healthcare wishes?: Yes  [unfilled]     EMERGENCY CONTACT:

## 2018-11-05 NOTE — DISCHARGE SUMMARY
Hospital Medicine Discharge Summary    Patient ID: Keyanna Herrera      Patient's PCP: Luis Morales MD    Admit Date: 10/24/2018     Discharge Date:   11/05/18     Admitting Physician: Sudha Mason DO     Discharge Physician: Sudha Mason DO     Discharge Diagnoses: Active Hospital Problems    Diagnosis Date Noted    Staphylococcus aureus bacteremia without sepsis [R78.81] 10/27/2018    Osteomyelitis ([address] [M86.9] 10/24/2018    Chronic ulcer of right foot with fat layer exposed ([address] [L97.512] 10/24/2018    History of DVT (deep vein thrombosis) [Z86.718] 07/31/2018    HLD (hyperlipidemia) [E78.5] 07/31/2018    Microcytic anemia [D50.9] 07/31/2018    Morbid obesity with BMI of 40.0-44.9, adult ([address] [E66.01, Z68.41] 07/31/2018    PVD (peripheral vascular disease) ([address] [I73.9] 07/31/2018    Stage 3 chronic kidney disease ([address] [N18.3] 07/31/2018    Tobacco dependence [F17.200] 07/31/2018    Uncontrolled diabetes mellitus with hyperglycemia ([address] [E11.65] 07/31/2018    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 2 diabetes mellitus, with fat layer exposed ([address] [X11.913, L97.412] 05/22/2018    HTN (hypertension) [I10] 05/21/2018       The patient was seen and examined on day of discharge and this discharge summary is in conjunction with any daily progress note from day of discharge. Admission HPI: 63 y.o. male who presented to [facility] from [facility] with past medical history of diabetes, insulin-dependent, severe peripheral vascular disease status post bypass with ulceration, DVT on Coumadin, hypertension and hyperlipidemia and osteomyelitis. Patient was just discharged from the hospital and 8/7/18 following hospitalization for osteomyelitis of the right foot with wound culture that grew polymicrobial organisms including hemolytic Streptococcus, E. coli, Klebsiella and staph species. He was sent to [facility] on Vanco 2 g every 24 and Rocephin 2 g daily for 6 weeks.

## 2018-11-06 LAB
ANION GAP SERPL CALCULATED.3IONS-SCNC: 9 MMOL/L (ref 7–16)
ANISOCYTOSIS: ABNORMAL
BASOPHILS ABSOLUTE: 0.05 E9/L (ref 0–0.2)
BASOPHILS RELATIVE PERCENT: 0.5 % (ref 0–2)
BUN BLDV-MCNC: 18 MG/DL (ref 8–23)
CALCIUM SERPL-MCNC: 9.2 MG/DL (ref 8.6–10.2)
CHLORIDE BLD-SCNC: 100 MMOL/L (ref 98–107)
CO2: 28 MMOL/L (ref 22–29)
CREAT SERPL-MCNC: 1.5 MG/DL (ref 0.7–1.2)
EOSINOPHILS ABSOLUTE: 0.31 E9/L (ref 0.05–0.5)
EOSINOPHILS RELATIVE PERCENT: 2.8 % (ref 0–6)
GFR AFRICAN AMERICAN: 58
GFR NON-AFRICAN AMERICAN: 58 ML/MIN/1.73
GLUCOSE BLD-MCNC: 181 MG/DL (ref 74–99)
HCT VFR BLD CALC: 26.6 % (ref 37–54)
HEMOGLOBIN: 8.1 G/DL (ref 12.5–16.5)
HYPOCHROMIA: ABNORMAL
IMMATURE GRANULOCYTES #: 0.08 E9/L
IMMATURE GRANULOCYTES %: 0.7 % (ref 0–5)
LYMPHOCYTES ABSOLUTE: 2.49 E9/L (ref 1.5–4)
LYMPHOCYTES RELATIVE PERCENT: 22.8 % (ref 20–42)
MCH RBC QN AUTO: 22.4 PG (ref 26–35)
MCHC RBC AUTO-ENTMCNC: 30.5 % (ref 32–34.5)
MCV RBC AUTO: 73.7 FL (ref 80–99.9)
METER GLUCOSE: 104 MG/DL (ref 74–99)
METER GLUCOSE: 153 MG/DL (ref 74–99)
METER GLUCOSE: 180 MG/DL (ref 74–99)
METER GLUCOSE: 83 MG/DL (ref 74–99)
METER GLUCOSE: 83 MG/DL (ref 74–99)
MONOCYTES ABSOLUTE: 0.78 E9/L (ref 0.1–0.95)
MONOCYTES RELATIVE PERCENT: 7.1 % (ref 2–12)
NEUTROPHILS ABSOLUTE: 7.2 E9/L (ref 1.8–7.3)
NEUTROPHILS RELATIVE PERCENT: 66.1 % (ref 43–80)
PDW BLD-RTO: 20.3 FL (ref 11.5–15)
PLATELET # BLD: 241 E9/L (ref 130–450)
PMV BLD AUTO: 12 FL (ref 7–12)
POIKILOCYTES: ABNORMAL
POLYCHROMASIA: ABNORMAL
POTASSIUM REFLEX MAGNESIUM: 3.8 MMOL/L (ref 3.5–5)
RBC # BLD: 3.61 E12/L (ref 3.8–5.8)
SODIUM BLD-SCNC: 137 MMOL/L (ref 132–146)
TARGET CELLS: ABNORMAL
WBC # BLD: 10.9 E9/L (ref 4.5–11.5)

## 2018-11-06 PROCEDURE — 97166 OT EVAL MOD COMPLEX 45 MIN: CPT

## 2018-11-06 PROCEDURE — 97530 THERAPEUTIC ACTIVITIES: CPT

## 2018-11-06 PROCEDURE — 97161 PT EVAL LOW COMPLEX 20 MIN: CPT

## 2018-11-06 PROCEDURE — 36415 COLL VENOUS BLD VENIPUNCTURE: CPT

## 2018-11-06 PROCEDURE — 82962 GLUCOSE BLOOD TEST: CPT

## 2018-11-06 PROCEDURE — 97110 THERAPEUTIC EXERCISES: CPT

## 2018-11-06 PROCEDURE — 6360000002 HC RX W HCPCS: Performed by: INTERNAL MEDICINE

## 2018-11-06 PROCEDURE — 85025 COMPLETE CBC W/AUTO DIFF WBC: CPT

## 2018-11-06 PROCEDURE — 80048 BASIC METABOLIC PNL TOTAL CA: CPT

## 2018-11-06 PROCEDURE — 2580000003 HC RX 258: Performed by: INTERNAL MEDICINE

## 2018-11-06 PROCEDURE — 6370000000 HC RX 637 (ALT 250 FOR IP): Performed by: INTERNAL MEDICINE

## 2018-11-06 PROCEDURE — 97535 SELF CARE MNGMENT TRAINING: CPT

## 2018-11-06 PROCEDURE — 1280000000 HC REHAB R&B

## 2018-11-06 RX ORDER — CLONIDINE HYDROCHLORIDE 0.1 MG/1
0.1 TABLET ORAL 3 TIMES DAILY
Status: DISCONTINUED | OUTPATIENT
Start: 2018-11-06 | End: 2018-11-14 | Stop reason: HOSPADM

## 2018-11-06 RX ORDER — LIDOCAINE 50 MG/G
1 PATCH TOPICAL DAILY
Status: DISCONTINUED | OUTPATIENT
Start: 2018-11-06 | End: 2018-11-14 | Stop reason: HOSPADM

## 2018-11-06 RX ADMIN — LISINOPRIL 10 MG: 10 TABLET ORAL at 08:59

## 2018-11-06 RX ADMIN — CILOSTAZOL 100 MG: 100 TABLET ORAL at 22:10

## 2018-11-06 RX ADMIN — HEPARIN SODIUM 5000 UNITS: 10000 INJECTION INTRAVENOUS; SUBCUTANEOUS at 14:01

## 2018-11-06 RX ADMIN — DULOXETINE HYDROCHLORIDE 120 MG: 60 CAPSULE, DELAYED RELEASE ORAL at 08:59

## 2018-11-06 RX ADMIN — PETROLATUM: 42 OINTMENT TOPICAL at 22:26

## 2018-11-06 RX ADMIN — INSULIN LISPRO 3 UNITS: 100 INJECTION, SOLUTION INTRAVENOUS; SUBCUTANEOUS at 07:01

## 2018-11-06 RX ADMIN — METOPROLOL TARTRATE 100 MG: 50 TABLET, FILM COATED ORAL at 22:10

## 2018-11-06 RX ADMIN — INSULIN LISPRO 3 UNITS: 100 INJECTION, SOLUTION INTRAVENOUS; SUBCUTANEOUS at 11:54

## 2018-11-06 RX ADMIN — INSULIN LISPRO 5 UNITS: 100 INJECTION, SOLUTION INTRAVENOUS; SUBCUTANEOUS at 11:55

## 2018-11-06 RX ADMIN — Medication 10 ML: at 08:59

## 2018-11-06 RX ADMIN — HYDROCODONE BITARTRATE AND ACETAMINOPHEN 2 TABLET: 5; 325 TABLET ORAL at 08:06

## 2018-11-06 RX ADMIN — HYDROCODONE BITARTRATE AND ACETAMINOPHEN 2 TABLET: 5; 325 TABLET ORAL at 12:15

## 2018-11-06 RX ADMIN — SPIRONOLACTONE 50 MG: 25 TABLET ORAL at 08:59

## 2018-11-06 RX ADMIN — MORPHINE SULFATE 15 MG: 15 TABLET, FILM COATED, EXTENDED RELEASE ORAL at 22:11

## 2018-11-06 RX ADMIN — PANTOPRAZOLE SODIUM 40 MG: 40 TABLET, DELAYED RELEASE ORAL at 07:01

## 2018-11-06 RX ADMIN — INSULIN GLARGINE 15 UNITS: 100 INJECTION, SOLUTION SUBCUTANEOUS at 09:05

## 2018-11-06 RX ADMIN — SENNOSIDES AND DOCUSATE SODIUM 2 TABLET: 8.6; 5 TABLET ORAL at 22:10

## 2018-11-06 RX ADMIN — FERROUS SULFATE TAB 325 MG (65 MG ELEMENTAL FE) 325 MG: 325 (65 FE) TAB at 08:59

## 2018-11-06 RX ADMIN — SENNOSIDES AND DOCUSATE SODIUM 2 TABLET: 8.6; 5 TABLET ORAL at 08:58

## 2018-11-06 RX ADMIN — Medication 10 ML: at 22:26

## 2018-11-06 RX ADMIN — DAPTOMYCIN 500 MG: 500 INJECTION, POWDER, LYOPHILIZED, FOR SOLUTION INTRAVENOUS at 15:42

## 2018-11-06 RX ADMIN — HEPARIN SODIUM 5000 UNITS: 10000 INJECTION INTRAVENOUS; SUBCUTANEOUS at 22:11

## 2018-11-06 RX ADMIN — PREGABALIN 200 MG: 100 CAPSULE ORAL at 22:10

## 2018-11-06 RX ADMIN — METOPROLOL TARTRATE 100 MG: 50 TABLET, FILM COATED ORAL at 08:58

## 2018-11-06 RX ADMIN — PETROLATUM: 42 OINTMENT TOPICAL at 11:48

## 2018-11-06 RX ADMIN — HYDROCODONE BITARTRATE AND ACETAMINOPHEN 2 TABLET: 5; 325 TABLET ORAL at 17:20

## 2018-11-06 RX ADMIN — INSULIN GLARGINE 15 UNITS: 100 INJECTION, SOLUTION SUBCUTANEOUS at 22:11

## 2018-11-06 RX ADMIN — PREGABALIN 200 MG: 100 CAPSULE ORAL at 08:58

## 2018-11-06 RX ADMIN — MORPHINE SULFATE 15 MG: 15 TABLET, FILM COATED, EXTENDED RELEASE ORAL at 08:58

## 2018-11-06 RX ADMIN — INSULIN LISPRO 5 UNITS: 100 INJECTION, SOLUTION INTRAVENOUS; SUBCUTANEOUS at 09:05

## 2018-11-06 RX ADMIN — CILOSTAZOL 100 MG: 100 TABLET ORAL at 08:59

## 2018-11-06 RX ADMIN — ASPIRIN 81 MG CHEWABLE TABLET 81 MG: 81 TABLET CHEWABLE at 08:59

## 2018-11-06 RX ADMIN — PREGABALIN 200 MG: 100 CAPSULE ORAL at 14:00

## 2018-11-06 RX ADMIN — AMLODIPINE BESYLATE 10 MG: 10 TABLET ORAL at 08:58

## 2018-11-06 RX ADMIN — HEPARIN SODIUM 5000 UNITS: 10000 INJECTION INTRAVENOUS; SUBCUTANEOUS at 07:01

## 2018-11-06 RX ADMIN — Medication 10 ML: at 07:35

## 2018-11-06 ASSESSMENT — PAIN SCALES - GENERAL
PAINLEVEL_OUTOF10: 9
PAINLEVEL_OUTOF10: 9
PAINLEVEL_OUTOF10: 7
PAINLEVEL_OUTOF10: 10
PAINLEVEL_OUTOF10: 8
PAINLEVEL_OUTOF10: 9

## 2018-11-06 ASSESSMENT — PAIN DESCRIPTION - FREQUENCY: FREQUENCY: CONTINUOUS

## 2018-11-06 ASSESSMENT — PAIN DESCRIPTION - PAIN TYPE: TYPE: SURGICAL PAIN

## 2018-11-06 ASSESSMENT — PAIN DESCRIPTION - LOCATION: LOCATION: LEG

## 2018-11-06 ASSESSMENT — PAIN DESCRIPTION - DESCRIPTORS: DESCRIPTORS: ACHING;CONSTANT;THROBBING

## 2018-11-06 ASSESSMENT — PAIN DESCRIPTION - ORIENTATION: ORIENTATION: RIGHT

## 2018-11-06 NOTE — H&P
[address]                  [address] [facility],  [facility]                              HISTORY AND PHYSICAL    PATIENT NAME: Latesha Whyte                    :        1955  MED REC NO:   [id number]                            ROOM:       [facility]  ACCOUNT NO:   [de-identified]                           ADMIT DATE: 2018  PROVIDER:     Sandi Saucedo MD    CHIEF COMPLAINT:  Right above-knee amputation. HISTORY OF PRESENT ILLNESS:  The patient has a longstanding history of  peripheral vascular disease and infective ulcers of the right leg. He  developed osteomyelitis. He had long-term IV antibiotics, but failed to  clear the infection, and eventually, had a right above-knee amputation  on 2018. He is still having pain and is at a maximal assist level  functionally and is felt to be a good candidate for further rehab. PAST MEDICAL HISTORY:  1. Hypertension. 2.  Type 2 diabetes mellitus. 3.  Nicotine addition. 4.  Peripheral vascular disease. 5.  Chronic renal impairment. 6.  Multiple ulcers. 7.  Previous deep vein thrombosis. ALLERGIES:  None known. CURRENT MEDICATIONS:  Norvasc, aspirin, Pletal, Catapres, Cubicin,  Cymbalta, iron, short- and long-acting insulin, Prinivil, Lopressor, MS  Contin, Protonix, Lyrica, and Norco for breakthrough pain. HABITS:  The patient smoked up until this hospitalization. SOCIAL HISTORY:  He will be going with a daughter-in-law at discharge. REVIEW OF SYSTEMS:  See prior consult. PHYSICAL EXAMINATION:  GENERAL:  Morbidly obese -American male, in no acute distress. HEENT:  Head normocephalic and atraumatic. Eyes:  Pupils are equal,  round, and reactive to light. Pharynx normal.  LUNGS:  Clear to P and A. HEART:  Regular rate and rhythm. S1 and S2 normal.  No murmurs or  gallops. ABDOMEN:  Bowel sounds normal.  Soft and nontender. No masses or  organomegaly.   EXTREMITIES:

## 2018-11-06 NOTE — PATIENT CARE CONFERENCE
[address] NOTE/PATIENT PLAN OF CARE    Date: 2018  Admission date: 2018  Patient Name: Bhavani Snow        MRN: [id number]    : 1955  (63 y.o.)  Gender: male   Rehab diagnosis/surgery with date:  Right above knee amputation on 18 due to chronic non-healing foot wound with osteomyelitis and gangrene  Impairment Group Code: 5.3      MEDICAL/FUNCTIONAL HISTORY/STATUS:  morbid obesity prior to admission, uncontrolled insulin dependent diabetes, peripheral vascular disease    Consultations/Labs/X-rays:       MEDICATION UPDATE:  on IV Cubicin 500 mg every 24 hours, will consult ID for continuing antibiotic management      NURSING FIMS:    Bowel:   Current level: Modified independent  Short term bowel goal:  Modified independent  Long term bowel goal: Modified independent    Bladder:   Current level: dependent, rasmussen  Short term bladder goal: supervision  Long term bladder goal: supervision    Toilet Hygiene:   Current level : not yet occurred    Skin integrity: right AKA site, sutures and staples, 4 by 4s, needs a stockinette  Pain: 10 out of 10, MS Contin and percocet    NUTRITION    Diet  Carb controlled  Liquid consistency   thin    SOCIAL INFORMATION:  Lives with: alone  Prior community services:  had home health prior to admission  Home Architecture:  ranch, 10 entry, 1 rail  Prior Level of function:  has 2 kids locally, independent, SSD since 06 for back issues  DME:  quad cane , shower chair, wheelchair, glucometer    FAMILY / PATIENT EDUCATION:  safety and self care are ongoing    [address] yet evaluated by PT at time of team conference but is a good candidate based on pre-admission assessment      OCCUPATIONAL THERAPY:      Feeding:  Current level: supervision  Short term feeding goal: Modified independent  Long term feeding goal: independent    Grooming:   Current level: supervision seated  Short term

## 2018-11-06 NOTE — PROGRESS NOTES
ww requiring Min A for safety and balance- poor stand tolerance demo'd. WC pushups 2 X 10 reps to increase BUE strength for independence with sit to stand transfers    Ergometer 2 X 5 Mins forward/backward to increase endurance, BUE strength and ROM for independence with functional tasks and transfers    Sensory / Neuromuscular Re-Education:      Cognitive Skills:   Status Comments   Problem   Solving good     Memory good     Sequencing good     Safety good       Visual Perception:    Education:  Pt educated on hand placement and safety during sit to stand    [] Family teach completed on:    Pain Level: 8.5/10    Additional Notes:   Pt given amputee chair cushion to provide support to RLE and decrease pain. Patient has made good  progress during treatment sessions toward set goals. Therapy emphasis to obtain goals: Gait Training, Patient/Caregiver Education & Training, Home Management Training, Equipment Evaluation, Education, & procurement, Balance Training, Functional Mobility Training, Endurance Training, Wheelchair Mobility Training, Safety Education & Training, Self-Care / ADL    [x] Continue with current OT Plan of care.   [] Prepare for Discharge     DISCHARGE RECOMMENDATIONS  Recommended DME:    Post Discharge Care:   []Home Independently  []Home with 24hr Care / Supervision []Home with Partial Supervision [x]Home with Home Health OT and assist as needed  []Home with Out Pt OT []Other: ___   Comments:         Time in Time out Tx Time Breakdown  Variance:   First Session  eval+rx  [] Individual Tx-   [] Concurrent Tx -  [] Co-Tx -   [] Group Tx -   [] Time Missed -     Second Session 1:45 2:30 [x] Individual Tx- 45 Mins  [] Concurrent Tx -  [] Co-Tx -   [] Group Tx -   [] Time Missed -     Third Session    [] Individual Tx-   [] Concurrent Tx -  [] Co-Tx -   [] Group Tx -   [] Time Missed -         Total Tx Time  45 Mins   Jeane BUCK/L [id number]      I have read & agree with the above status.     Leonid [facility] OTR/L [id number]

## 2018-11-06 NOTE — PROGRESS NOTES
Physical Therapy    Facility/Department: [address] REHAB  Treatment Note    NAME: Jayne Wolff  : 1955  MRN: [id number]    Date of Service: 2018   Evaluating Therapist: Derek Ambrocio. Esmer Roland P.T.    ROOM: [facility]  DIAGNOSIS: s/p R AKA  PRECAUTIONS: fall risk  PROCEDURE(S): R AKA (18)    Social: Plan is to be D/C with daughter-in-law in a 2 floor plan (1st floor set up) with 3 steps and 1 rail to enter. Pt resides in a 1 floor plan apartment with 10 steps and 1 rail to enter. Prior to admission pt ambulated with a single point cane as needed. Initial Evaluation  Date: 18      PM    Short Term Goals Long Term Goals    Was pt agreeable to Eval/treatment? Yes Initial Evaluation Yes     Does pt have pain?  9/10 stump pain  9/10 stump pain     Bed Mobility  Rolling: SBA  Supine to sit: SBA  Sit to supine: SBA  Scooting: SBA  NT Supervision Modified Independent   Transfers Sit to stand: Mila  Stand to sit: SBA  Stand pivot: Mila with Wide [employer]  Slide board transfer: SBA  Sit to stand: CGA  Stand to sit: SBA  Stand pivot: CGA with wide [employer] Supervision Modified Independent   Ambulation   10 feet with Wide WW with Mila  NT 25 feet with AAD with Supervision >50 feet with AAD with Modified [place]   Walking 10 feet on uneven surface NT  NT 10 feet with AAD with SBA 10 feet with AAD with Modified [place]   Wheel Chair Mobility 150 feet propelling with BUE and LLE with Supervision  150 feet propelling with BUE and LLE with Supervision 250 feet propelling with BUE and LLE with Supervision >300 feet propelling with BUE and LLE with Modified [place]   Car Transfers NT  NT SBA Modified Independent   Stair negotiation: ascended and descended NT  NT 3 steps with 1 rail + tub seat with Mod A >4 steps with 1 rail + tub seat with Min A   Curb Step:   ascended and descended NT  NT 4 inch step with AAD and Min A 4 inch step with AAD and SBA   Picking up object off the floor NT  NT Will  an object with SBA Will  an object with Supervision   BLE ROM [facility]  WFL     BLE Strength Grossly 4+/5 in LLE   R residual limb is at least 3/5  Grossly 4+/5 in LLE   R residual limb is at least 3/5     Balance  Sitting EOM: SBA  Dynamic Standing: Mila  Sitting EOM: SBA  Dynamic Standing: Mila     Date Family Teach Completed No family present at initial evaluation  No family present to this date     Is additional Family Teaching Needed? Y or N Yes  Yes     Hindering Progress Pain  Pain     PT recommended ELOS 2 weeks       Team's Discharge Plan        Therapist at Team Meeting          Therapeutic Exercise:   PM:   Sit <> stand from elevated mat x 3 with wide WW with SBA  Stand pivot transfer from mat to [facility] with Wide WW with SBA<>CGA  Standing dynamic balance with single UE support on wide WW with CGA/Min A x 1 minute  Wheelchair push-ups 2 x 10  LAQ: 2 x 10  Sitting dynamic balance with reaching outside DERIK with each UE x 5 with SBA  WC propulsion 2 x 150 feet with BUE and LLE with Supervision      Additional Comments:   During PM session, pt reported 9/10 stump pain that limits him with functional tasks. Pt was educated on leaning forward during sit <> stand transfers to aid in lifting hips off surface. Spoke with nursing, and ACE wrap was ordered for residual limp to decrease pain and swelling. Discussed options to get up the steps at the pt's daughter-in-law's home, he states he has 2 steps to get up in the back of the home and the pt's daughter-in-law is planning on getting a portable ramp. Patient/family education  Pt/family educated on leaning forward during sit <> stand transfers. Patient/family response to education:   Pt/family verbalized understanding Pt/family demonstrated skill Pt/family requires further education in this area   Yes Yes Yes, reinforce     PM  Time in: 1430  Time out: 1515    Pt is making good progress toward established Physical Therapy goals.   Continue with physical therapy current

## 2018-11-07 LAB
ANION GAP SERPL CALCULATED.3IONS-SCNC: 11 MMOL/L (ref 7–16)
ANISOCYTOSIS: ABNORMAL
BASOPHILS ABSOLUTE: 0 E9/L (ref 0–0.2)
BASOPHILS RELATIVE PERCENT: 0.4 % (ref 0–2)
BUN BLDV-MCNC: 17 MG/DL (ref 8–23)
CALCIUM SERPL-MCNC: 9 MG/DL (ref 8.6–10.2)
CHLORIDE BLD-SCNC: 100 MMOL/L (ref 98–107)
CO2: 27 MMOL/L (ref 22–29)
CREAT SERPL-MCNC: 1.5 MG/DL (ref 0.7–1.2)
EOSINOPHILS ABSOLUTE: 0.22 E9/L (ref 0.05–0.5)
EOSINOPHILS RELATIVE PERCENT: 1.7 % (ref 0–6)
GFR AFRICAN AMERICAN: 58
GFR NON-AFRICAN AMERICAN: 58 ML/MIN/1.73
GLUCOSE BLD-MCNC: 116 MG/DL (ref 74–99)
HCT VFR BLD CALC: 27.1 % (ref 37–54)
HEMOGLOBIN: 8.2 G/DL (ref 12.5–16.5)
HYPOCHROMIA: ABNORMAL
LYMPHOCYTES ABSOLUTE: 1.58 E9/L (ref 1.5–4)
LYMPHOCYTES RELATIVE PERCENT: 12.2 % (ref 20–42)
MCH RBC QN AUTO: 21.9 PG (ref 26–35)
MCHC RBC AUTO-ENTMCNC: 30.3 % (ref 32–34.5)
MCV RBC AUTO: 72.3 FL (ref 80–99.9)
METER GLUCOSE: 124 MG/DL (ref 74–99)
METER GLUCOSE: 137 MG/DL (ref 74–99)
METER GLUCOSE: 152 MG/DL (ref 74–99)
METER GLUCOSE: 203 MG/DL (ref 74–99)
METER GLUCOSE: 87 MG/DL (ref 74–99)
MONOCYTES ABSOLUTE: 1.06 E9/L (ref 0.1–0.95)
MONOCYTES RELATIVE PERCENT: 7.8 % (ref 2–12)
NEUTROPHILS ABSOLUTE: 10.3 E9/L (ref 1.8–7.3)
NEUTROPHILS RELATIVE PERCENT: 78.3 % (ref 43–80)
PDW BLD-RTO: 20.1 FL (ref 11.5–15)
PLATELET # BLD: 241 E9/L (ref 130–450)
PMV BLD AUTO: 11.2 FL (ref 7–12)
POLYCHROMASIA: ABNORMAL
POTASSIUM REFLEX MAGNESIUM: 3.8 MMOL/L (ref 3.5–5)
RBC # BLD: 3.75 E12/L (ref 3.8–5.8)
SODIUM BLD-SCNC: 138 MMOL/L (ref 132–146)
WBC # BLD: 13.2 E9/L (ref 4.5–11.5)

## 2018-11-07 PROCEDURE — 36591 DRAW BLOOD OFF VENOUS DEVICE: CPT

## 2018-11-07 PROCEDURE — 97530 THERAPEUTIC ACTIVITIES: CPT

## 2018-11-07 PROCEDURE — 2580000003 HC RX 258: Performed by: INTERNAL MEDICINE

## 2018-11-07 PROCEDURE — 97110 THERAPEUTIC EXERCISES: CPT

## 2018-11-07 PROCEDURE — 6360000002 HC RX W HCPCS: Performed by: INTERNAL MEDICINE

## 2018-11-07 PROCEDURE — 82962 GLUCOSE BLOOD TEST: CPT

## 2018-11-07 PROCEDURE — 97535 SELF CARE MNGMENT TRAINING: CPT

## 2018-11-07 PROCEDURE — 85025 COMPLETE CBC W/AUTO DIFF WBC: CPT

## 2018-11-07 PROCEDURE — 1280000000 HC REHAB R&B

## 2018-11-07 PROCEDURE — 36415 COLL VENOUS BLD VENIPUNCTURE: CPT

## 2018-11-07 PROCEDURE — 6370000000 HC RX 637 (ALT 250 FOR IP): Performed by: PHYSICAL MEDICINE & REHABILITATION

## 2018-11-07 PROCEDURE — 6370000000 HC RX 637 (ALT 250 FOR IP): Performed by: INTERNAL MEDICINE

## 2018-11-07 PROCEDURE — 80048 BASIC METABOLIC PNL TOTAL CA: CPT

## 2018-11-07 RX ADMIN — MORPHINE SULFATE 15 MG: 15 TABLET, FILM COATED, EXTENDED RELEASE ORAL at 08:23

## 2018-11-07 RX ADMIN — DAPTOMYCIN 500 MG: 500 INJECTION, POWDER, LYOPHILIZED, FOR SOLUTION INTRAVENOUS at 14:30

## 2018-11-07 RX ADMIN — HEPARIN SODIUM 5000 UNITS: 10000 INJECTION INTRAVENOUS; SUBCUTANEOUS at 14:10

## 2018-11-07 RX ADMIN — PETROLATUM: 42 OINTMENT TOPICAL at 08:36

## 2018-11-07 RX ADMIN — PETROLATUM: 42 OINTMENT TOPICAL at 17:28

## 2018-11-07 RX ADMIN — Medication 10 ML: at 22:16

## 2018-11-07 RX ADMIN — INSULIN LISPRO 5 UNITS: 100 INJECTION, SOLUTION INTRAVENOUS; SUBCUTANEOUS at 17:27

## 2018-11-07 RX ADMIN — SENNOSIDES AND DOCUSATE SODIUM 2 TABLET: 8.6; 5 TABLET ORAL at 08:24

## 2018-11-07 RX ADMIN — METOPROLOL TARTRATE 100 MG: 50 TABLET, FILM COATED ORAL at 08:24

## 2018-11-07 RX ADMIN — PREGABALIN 200 MG: 100 CAPSULE ORAL at 21:19

## 2018-11-07 RX ADMIN — INSULIN LISPRO 5 UNITS: 100 INJECTION, SOLUTION INTRAVENOUS; SUBCUTANEOUS at 07:02

## 2018-11-07 RX ADMIN — INSULIN LISPRO 5 UNITS: 100 INJECTION, SOLUTION INTRAVENOUS; SUBCUTANEOUS at 11:52

## 2018-11-07 RX ADMIN — AMLODIPINE BESYLATE 10 MG: 10 TABLET ORAL at 08:24

## 2018-11-07 RX ADMIN — SENNOSIDES AND DOCUSATE SODIUM 2 TABLET: 8.6; 5 TABLET ORAL at 21:19

## 2018-11-07 RX ADMIN — HEPARIN SODIUM 5000 UNITS: 10000 INJECTION INTRAVENOUS; SUBCUTANEOUS at 06:56

## 2018-11-07 RX ADMIN — INSULIN GLARGINE 15 UNITS: 100 INJECTION, SOLUTION SUBCUTANEOUS at 21:20

## 2018-11-07 RX ADMIN — MORPHINE SULFATE 15 MG: 15 TABLET, FILM COATED, EXTENDED RELEASE ORAL at 21:19

## 2018-11-07 RX ADMIN — INSULIN GLARGINE 15 UNITS: 100 INJECTION, SOLUTION SUBCUTANEOUS at 07:01

## 2018-11-07 RX ADMIN — CILOSTAZOL 100 MG: 100 TABLET ORAL at 08:24

## 2018-11-07 RX ADMIN — Medication 10 ML: at 07:10

## 2018-11-07 RX ADMIN — FERROUS SULFATE TAB 325 MG (65 MG ELEMENTAL FE) 325 MG: 325 (65 FE) TAB at 08:24

## 2018-11-07 RX ADMIN — PREGABALIN 200 MG: 100 CAPSULE ORAL at 08:24

## 2018-11-07 RX ADMIN — LISINOPRIL 10 MG: 10 TABLET ORAL at 08:24

## 2018-11-07 RX ADMIN — SPIRONOLACTONE 50 MG: 25 TABLET ORAL at 08:24

## 2018-11-07 RX ADMIN — HYDROCODONE BITARTRATE AND ACETAMINOPHEN 2 TABLET: 5; 325 TABLET ORAL at 14:10

## 2018-11-07 RX ADMIN — HEPARIN SODIUM 5000 UNITS: 10000 INJECTION INTRAVENOUS; SUBCUTANEOUS at 21:19

## 2018-11-07 RX ADMIN — HYDROCODONE BITARTRATE AND ACETAMINOPHEN 2 TABLET: 5; 325 TABLET ORAL at 07:26

## 2018-11-07 RX ADMIN — PANTOPRAZOLE SODIUM 40 MG: 40 TABLET, DELAYED RELEASE ORAL at 06:56

## 2018-11-07 RX ADMIN — ASPIRIN 81 MG CHEWABLE TABLET 81 MG: 81 TABLET CHEWABLE at 08:24

## 2018-11-07 RX ADMIN — CILOSTAZOL 100 MG: 100 TABLET ORAL at 21:19

## 2018-11-07 RX ADMIN — METOPROLOL TARTRATE 100 MG: 50 TABLET, FILM COATED ORAL at 21:19

## 2018-11-07 RX ADMIN — DULOXETINE HYDROCHLORIDE 120 MG: 60 CAPSULE, DELAYED RELEASE ORAL at 08:24

## 2018-11-07 RX ADMIN — PREGABALIN 200 MG: 100 CAPSULE ORAL at 14:10

## 2018-11-07 ASSESSMENT — PAIN SCALES - GENERAL
PAINLEVEL_OUTOF10: 6
PAINLEVEL_OUTOF10: 0
PAINLEVEL_OUTOF10: 9
PAINLEVEL_OUTOF10: 0
PAINLEVEL_OUTOF10: 7
PAINLEVEL_OUTOF10: 7
PAINLEVEL_OUTOF10: 0
PAINLEVEL_OUTOF10: 10
PAINLEVEL_OUTOF10: 8
PAINLEVEL_OUTOF10: 7

## 2018-11-07 ASSESSMENT — PAIN DESCRIPTION - LOCATION
LOCATION: LEG

## 2018-11-07 ASSESSMENT — PAIN DESCRIPTION - FREQUENCY
FREQUENCY: CONTINUOUS

## 2018-11-07 ASSESSMENT — PAIN DESCRIPTION - PAIN TYPE
TYPE: SURGICAL PAIN
TYPE: ACUTE PAIN

## 2018-11-07 ASSESSMENT — PAIN DESCRIPTION - DESCRIPTORS
DESCRIPTORS: ACHING;CONSTANT;DISCOMFORT
DESCRIPTORS: CONSTANT;THROBBING
DESCRIPTORS: ACHING;CONSTANT
DESCRIPTORS: ACHING;CONSTANT

## 2018-11-07 ASSESSMENT — PAIN DESCRIPTION - ONSET
ONSET: ON-GOING
ONSET: ON-GOING

## 2018-11-07 ASSESSMENT — PAIN DESCRIPTION - ORIENTATION
ORIENTATION: RIGHT
ORIENTATION: RIGHT;LOWER
ORIENTATION: RIGHT
ORIENTATION: RIGHT

## 2018-11-07 ASSESSMENT — PAIN DESCRIPTION - PROGRESSION
CLINICAL_PROGRESSION: NOT CHANGED
CLINICAL_PROGRESSION: NOT CHANGED

## 2018-11-07 NOTE — PROGRESS NOTES
Nguyen Mo is a 63 y.o. male patient.     Current Facility-Administered Medications   Medication Dose Route Frequency Provider Last Rate Last Dose    cloNIDine (CATAPRES) tablet 0.1 mg  0.1 mg Oral TID Gigi Richards MD        lidocaine (LIDODERM) 5 % 1 patch  1 patch Transdermal Daily Gigi Richards MD        ondansetron (ZOFRAN) injection 4 mg  4 mg Intravenous Q6H PRN Reji Berman, DO        sodium chloride flush 0.9 % injection 10 mL  10 mL Intravenous 2 times per day Reji Berman, DO   10 mL at 11/07/18 0710    dextrose 5 % solution  100 mL/hr Intravenous PRN Reji Berman, DO        dextrose 50 % solution 12.5 g  12.5 g Intravenous PRN Reji Berman, DO        glucagon (rDNA) injection 1 mg  1 mg Intramuscular PRN Reji Berman, DO        glucose (GLUTOSE) 40 % oral gel 15 g  15 g Oral PRN Reji Berman, DO        amLODIPine (NORVASC) tablet 10 mg  10 mg Oral QAM Reji Berman, DO   10 mg at 11/06/18 0858    aspirin chewable tablet 81 mg  81 mg Oral Daily [place] Cory, DO   81 mg at 11/06/18 0859    cilostazol (PLETAL) tablet 100 mg  100 mg Oral BID [place] Cory, DO   100 mg at 11/06/18 2210    daptomycin (CUBICIN) 500 mg in sodium chloride 0.9 % 50 mL IVPB  500 mg Intravenous Q24H Reji Berman, DO   Stopped at 11/06/18 1620    DULoxetine (CYMBALTA) extended release capsule 120 mg  120 mg Oral QAM [place] Cory, DO   120 mg at 11/06/18 0859    ferrous sulfate tablet 325 mg  325 mg Oral BID WC Reji Berman, DO   325 mg at 11/06/18 0859    heparin (porcine) injection 5,000 Units  5,000 Units Subcutaneous 3 times per day Reji Berman DO   5,000 Units at 11/07/18 0656    HYDROcodone-acetaminophen (NORCO) 5-325 MG per tablet 1 tablet  1 tablet Oral Q4H PRN Reji Berman, DO        Or    HYDROcodone-acetaminophen (NORCO) 5-325 MG per tablet 2 tablet  2 tablet Oral Q4H PRN Reji Berman, DO   2 tablet at 11/07/18 0726    insulin glargine (LANTUS) injection vial 15

## 2018-11-07 NOTE — PROGRESS NOTES
ID Progress Note                [address], [place], [address] [facility]            Phone [phone]     Fax [phone]      Brief Interval History  MRSA bactremia  Subjective:  Pt now in rehab  The patient is awake and alert. Tolerating medications. Reports no side effects. Afebrile. 10 ROS otherwise negative unless otherwise specified above. Objective:    Vitals:    11/07/18 1445   BP: 121/63   Pulse: 66   Resp:    Temp:    SpO2:      General Appearance:    Awake, alert , no acute distress. HEENT:    Normocephalic,PERRL,neck supple, no JVD, mucosa moist, no thrush   Lungs:     Clear to auscultation bilaterally, no wheeze , crackles   Heart:    Regular rate and rhythm, no murmur   Abdomen:     Soft, non-tender, not distended  bowel sounds present,   Extremities: Rt knee AKA, wound vac taken out now   Skin:  dry and warm         Labs:  Recent Labs      11/05/18   0549  11/06/18   0735  11/07/18   0710   WBC  12.0*  10.9  13.2*   RBC  3.52*  3.61*  3.75*   HGB  7.8*  8.1*  8.2*   HCT  25.7*  26.6*  27.1*   MCV  73.0*  73.7*  72.3*   MCH  22.2*  22.4*  21.9*   MCHC  30.4*  30.5*  30.3*   RDW  20.2*  20.3*  20.1*   PLT  215  241  241   MPV  11.4  12.0  11.2     CMP:    Lab Results   Component Value Date     11/07/2018    K 3.8 11/07/2018     11/07/2018    CO2 27 11/07/2018    BUN 17 11/07/2018    CREATININE 1.5 11/07/2018    GFRAA 58 11/07/2018    LABGLOM 58 11/07/2018    GLUCOSE 116 11/07/2018    PROT 7.2 10/28/2018    LABALBU 2.9 10/28/2018    CALCIUM 9.0 11/07/2018    BILITOT 0.3 10/28/2018    ALKPHOS 105 10/28/2018    AST 14 10/28/2018    ALT 20 10/28/2018          Microbiology :  No results for input(s): BC in the last 72 hours. No results for input(s): Ed Black in the last 72 hours. No results for input(s): LABURIN in the last 72 hours. No results for input(s): CULTRESP in the last 72 hours. No results for input(s): WNDABS in the last 72 hours.     Radiology

## 2018-11-07 NOTE — PROGRESS NOTES
3Physical Therapy    Facility/Department: [facility] REHAB  Treatment Note    NAME: Kyle Vazquez  : 1955  MRN: [id number]    Date of Service: 2018   Evaluating Therapist: Rhea Willams. Sanam Barnett P.T.    ROOM[email or web address]  DIAGNOSIS: s/p R AKA  PRECAUTIONS: fall risk  PROCEDURE(S): R AKA (18)    Social: Plan is to be D/C with daughter-in-law in a 2 floor plan (1st floor set up) with 3 steps and 1 rail to enter. Pt resides in a 1 floor plan apartment with 10 steps and 1 rail to enter. Prior to admission pt ambulated with a single point cane as needed. Initial Evaluation  Date: 18 AM     PM    Short Term Goals Long Term Goals    Was pt agreeable to Eval/treatment? Yes Yes Yes     Does pt have pain?  9/10 stump pain 9/10 stump pain 9/10 stump pain, became tearful due to pain during session     Bed Mobility  Rolling: SBA  Supine to sit: SBA  Sit to supine: SBA  Scooting: SBA Rolling: SBA  Supine to sit: Min A  Sit to supine: Min A  Scooting: SBA  Rolling: SBA  Supine to sit: Min A  Sit to supine: Min A  Scooting: SBA  Supervision Modified Independent   Transfers Sit to stand: Mila  Stand to sit: SBA  Stand pivot: Mila with Wide [employer]  Slide board transfer: SBA Sit to stand: Mila  Stand to sit: SBA  Stand pivot: Mila with Wide [employer] Sit to stand: Mila  Stand to sit: SBA  Stand pivot: Mila with Wide [employer] Supervision Modified Independent   Ambulation   10 feet with Wide [employer] with Mila NT NT 25 feet with AAD with Supervision >50 feet with AAD with Modified [place]   Walking 10 feet on uneven surface NT NT NT 10 feet with AAD with SBA 10 feet with AAD with Modified [place]   Wheel Chair Mobility 150 feet propelling with BUE and LLE with Supervision 150 feet propelling with BUE and LLE with Supervision 150 feet propelling with BUE and LLE with Supervision 250 feet propelling with BUE and LLE with Supervision >300 feet propelling with BUE and LLE with Modified [place]   Car Transfers NT NT NT SBA Modified

## 2018-11-07 NOTE — PROGRESS NOTES
Occupational Therapy     11/07/18 1414   Attendance   Activity (Outside Visit)   Participation Active participation   [facility] Demonstrates ability to complete social goals   Leisure Education Demonstrates knowledge of benefits of leisure involvement  (Identified relaxing and learning something as benefits.)   Time Spent With Patient   Minutes 30

## 2018-11-08 LAB
ANISOCYTOSIS: ABNORMAL
BASOPHILIC STIPPLING: ABNORMAL
BASOPHILS ABSOLUTE: 0.21 E9/L (ref 0–0.2)
BASOPHILS RELATIVE PERCENT: 1.7 % (ref 0–2)
EOSINOPHILS ABSOLUTE: 0.33 E9/L (ref 0.05–0.5)
EOSINOPHILS RELATIVE PERCENT: 2.6 % (ref 0–6)
HCT VFR BLD CALC: 25.9 % (ref 37–54)
HEMOGLOBIN: 8 G/DL (ref 12.5–16.5)
HYPOCHROMIA: ABNORMAL
LYMPHOCYTES ABSOLUTE: 2.77 E9/L (ref 1.5–4)
LYMPHOCYTES RELATIVE PERCENT: 21.7 % (ref 20–42)
MCH RBC QN AUTO: 22.2 PG (ref 26–35)
MCHC RBC AUTO-ENTMCNC: 30.9 % (ref 32–34.5)
MCV RBC AUTO: 71.9 FL (ref 80–99.9)
METER GLUCOSE: 109 MG/DL (ref 74–99)
METER GLUCOSE: 134 MG/DL (ref 74–99)
METER GLUCOSE: 145 MG/DL (ref 74–99)
METER GLUCOSE: 190 MG/DL (ref 74–99)
MONOCYTES ABSOLUTE: 0.88 E9/L (ref 0.1–0.95)
MONOCYTES RELATIVE PERCENT: 7 % (ref 2–12)
NEUTROPHILS ABSOLUTE: 8.44 E9/L (ref 1.8–7.3)
NEUTROPHILS RELATIVE PERCENT: 67 % (ref 43–80)
PDW BLD-RTO: 20 FL (ref 11.5–15)
PLATELET # BLD: 239 E9/L (ref 130–450)
PMV BLD AUTO: 12.1 FL (ref 7–12)
POLYCHROMASIA: ABNORMAL
RBC # BLD: 3.6 E12/L (ref 3.8–5.8)
WBC # BLD: 12.6 E9/L (ref 4.5–11.5)

## 2018-11-08 PROCEDURE — 82962 GLUCOSE BLOOD TEST: CPT

## 2018-11-08 PROCEDURE — 97530 THERAPEUTIC ACTIVITIES: CPT

## 2018-11-08 PROCEDURE — 6370000000 HC RX 637 (ALT 250 FOR IP): Performed by: PHYSICAL MEDICINE & REHABILITATION

## 2018-11-08 PROCEDURE — 6370000000 HC RX 637 (ALT 250 FOR IP): Performed by: INTERNAL MEDICINE

## 2018-11-08 PROCEDURE — 1280000000 HC REHAB R&B

## 2018-11-08 PROCEDURE — 51798 US URINE CAPACITY MEASURE: CPT

## 2018-11-08 PROCEDURE — 6360000002 HC RX W HCPCS: Performed by: INTERNAL MEDICINE

## 2018-11-08 PROCEDURE — 85025 COMPLETE CBC W/AUTO DIFF WBC: CPT

## 2018-11-08 PROCEDURE — 6360000002 HC RX W HCPCS: Performed by: PHYSICAL MEDICINE & REHABILITATION

## 2018-11-08 PROCEDURE — 36415 COLL VENOUS BLD VENIPUNCTURE: CPT

## 2018-11-08 PROCEDURE — 2580000003 HC RX 258: Performed by: INTERNAL MEDICINE

## 2018-11-08 PROCEDURE — 97110 THERAPEUTIC EXERCISES: CPT

## 2018-11-08 PROCEDURE — 97535 SELF CARE MNGMENT TRAINING: CPT

## 2018-11-08 PROCEDURE — 36591 DRAW BLOOD OFF VENOUS DEVICE: CPT

## 2018-11-08 RX ADMIN — ENOXAPARIN SODIUM 40 MG: 40 INJECTION SUBCUTANEOUS at 09:43

## 2018-11-08 RX ADMIN — MORPHINE SULFATE 15 MG: 15 TABLET, FILM COATED, EXTENDED RELEASE ORAL at 09:28

## 2018-11-08 RX ADMIN — HYDROCODONE BITARTRATE AND ACETAMINOPHEN 2 TABLET: 5; 325 TABLET ORAL at 16:42

## 2018-11-08 RX ADMIN — PREGABALIN 200 MG: 100 CAPSULE ORAL at 13:42

## 2018-11-08 RX ADMIN — METOPROLOL TARTRATE 100 MG: 50 TABLET, FILM COATED ORAL at 21:39

## 2018-11-08 RX ADMIN — CILOSTAZOL 100 MG: 100 TABLET ORAL at 09:29

## 2018-11-08 RX ADMIN — Medication 10 ML: at 22:52

## 2018-11-08 RX ADMIN — INSULIN LISPRO 5 UNITS: 100 INJECTION, SOLUTION INTRAVENOUS; SUBCUTANEOUS at 07:46

## 2018-11-08 RX ADMIN — ALTEPLASE 1 MG: 2.2 INJECTION, POWDER, LYOPHILIZED, FOR SOLUTION INTRAVENOUS at 14:40

## 2018-11-08 RX ADMIN — PREGABALIN 200 MG: 100 CAPSULE ORAL at 09:29

## 2018-11-08 RX ADMIN — CLONIDINE HYDROCHLORIDE 0.1 MG: 0.1 TABLET ORAL at 13:42

## 2018-11-08 RX ADMIN — MORPHINE SULFATE 15 MG: 15 TABLET, FILM COATED, EXTENDED RELEASE ORAL at 21:39

## 2018-11-08 RX ADMIN — METOPROLOL TARTRATE 100 MG: 50 TABLET, FILM COATED ORAL at 09:32

## 2018-11-08 RX ADMIN — Medication 10 ML: at 06:35

## 2018-11-08 RX ADMIN — LISINOPRIL 10 MG: 10 TABLET ORAL at 09:32

## 2018-11-08 RX ADMIN — HEPARIN SODIUM 5000 UNITS: 10000 INJECTION INTRAVENOUS; SUBCUTANEOUS at 06:18

## 2018-11-08 RX ADMIN — INSULIN LISPRO 5 UNITS: 100 INJECTION, SOLUTION INTRAVENOUS; SUBCUTANEOUS at 11:57

## 2018-11-08 RX ADMIN — CILOSTAZOL 100 MG: 100 TABLET ORAL at 21:39

## 2018-11-08 RX ADMIN — HYDROCODONE BITARTRATE AND ACETAMINOPHEN 2 TABLET: 5; 325 TABLET ORAL at 06:26

## 2018-11-08 RX ADMIN — PANTOPRAZOLE SODIUM 40 MG: 40 TABLET, DELAYED RELEASE ORAL at 06:18

## 2018-11-08 RX ADMIN — INSULIN GLARGINE 15 UNITS: 100 INJECTION, SOLUTION SUBCUTANEOUS at 21:41

## 2018-11-08 RX ADMIN — DULOXETINE HYDROCHLORIDE 120 MG: 60 CAPSULE, DELAYED RELEASE ORAL at 11:52

## 2018-11-08 RX ADMIN — INSULIN LISPRO 5 UNITS: 100 INJECTION, SOLUTION INTRAVENOUS; SUBCUTANEOUS at 17:12

## 2018-11-08 RX ADMIN — SENNOSIDES AND DOCUSATE SODIUM 2 TABLET: 8.6; 5 TABLET ORAL at 09:29

## 2018-11-08 RX ADMIN — INSULIN GLARGINE 15 UNITS: 100 INJECTION, SOLUTION SUBCUTANEOUS at 07:47

## 2018-11-08 RX ADMIN — SENNOSIDES AND DOCUSATE SODIUM 2 TABLET: 8.6; 5 TABLET ORAL at 21:38

## 2018-11-08 RX ADMIN — INSULIN LISPRO 3 UNITS: 100 INJECTION, SOLUTION INTRAVENOUS; SUBCUTANEOUS at 07:50

## 2018-11-08 RX ADMIN — HYDROCODONE BITARTRATE AND ACETAMINOPHEN 2 TABLET: 5; 325 TABLET ORAL at 11:53

## 2018-11-08 RX ADMIN — SPIRONOLACTONE 50 MG: 25 TABLET ORAL at 09:32

## 2018-11-08 RX ADMIN — PREGABALIN 200 MG: 100 CAPSULE ORAL at 21:39

## 2018-11-08 RX ADMIN — CLONIDINE HYDROCHLORIDE 0.1 MG: 0.1 TABLET ORAL at 21:40

## 2018-11-08 RX ADMIN — AMLODIPINE BESYLATE 10 MG: 10 TABLET ORAL at 09:32

## 2018-11-08 ASSESSMENT — PAIN DESCRIPTION - DESCRIPTORS
DESCRIPTORS: CONSTANT;ACHING;DISCOMFORT
DESCRIPTORS: ACHING
DESCRIPTORS: CONSTANT;ACHING;DISCOMFORT
DESCRIPTORS: CONSTANT;ACHING;DISCOMFORT
DESCRIPTORS: ACHING

## 2018-11-08 ASSESSMENT — PAIN DESCRIPTION - ORIENTATION
ORIENTATION: RIGHT

## 2018-11-08 ASSESSMENT — PAIN SCALES - GENERAL
PAINLEVEL_OUTOF10: 0
PAINLEVEL_OUTOF10: 8
PAINLEVEL_OUTOF10: 7
PAINLEVEL_OUTOF10: 7
PAINLEVEL_OUTOF10: 8
PAINLEVEL_OUTOF10: 7
PAINLEVEL_OUTOF10: 10
PAINLEVEL_OUTOF10: 5
PAINLEVEL_OUTOF10: 7
PAINLEVEL_OUTOF10: 7
PAINLEVEL_OUTOF10: 6

## 2018-11-08 ASSESSMENT — PAIN DESCRIPTION - PROGRESSION: CLINICAL_PROGRESSION: NOT CHANGED

## 2018-11-08 ASSESSMENT — PAIN DESCRIPTION - ONSET: ONSET: ON-GOING

## 2018-11-08 ASSESSMENT — PAIN DESCRIPTION - LOCATION
LOCATION: LEG

## 2018-11-08 ASSESSMENT — PAIN DESCRIPTION - PAIN TYPE
TYPE: SURGICAL PAIN

## 2018-11-08 ASSESSMENT — PAIN DESCRIPTION - FREQUENCY
FREQUENCY: CONTINUOUS

## 2018-11-08 NOTE — PROGRESS NOTES
Patient stated he was going outside. I asked him where his family member was. He said he was going outside without one. I stated that the order says a family member would need to be with him and that he is not to smoke while outside. He said, \" I'm 63years old, I can do what I want. I don't like to be told what to do, I'm grown, nobody can tell me what to do. \" He also stated that since he's 63years old he isn't going to change his ways now, so he's going outside to smoke. He then told me he got into an argument with the physician yesterday because he can do what he wants. I educated him on needing to have somebody with him because medically it isn't safe for him to go outside alone. He then said, \"Nurse Ratchet, I can do what I want. \" He put his jacket on and wheeled himself outside. The manager was notified and I was instructed to call a [place].

## 2018-11-09 ENCOUNTER — TELEPHONE (OUTPATIENT)
Dept: VASCULAR SURGERY | Age: 63
End: 2018-11-09

## 2018-11-09 LAB
ANISOCYTOSIS: ABNORMAL
BASOPHILS ABSOLUTE: 0 E9/L (ref 0–0.2)
BASOPHILS RELATIVE PERCENT: 0.4 % (ref 0–2)
EOSINOPHILS ABSOLUTE: 0.51 E9/L (ref 0.05–0.5)
EOSINOPHILS RELATIVE PERCENT: 4.4 % (ref 0–6)
HCT VFR BLD CALC: 26.9 % (ref 37–54)
HEMOGLOBIN: 8.2 G/DL (ref 12.5–16.5)
HYPOCHROMIA: ABNORMAL
LYMPHOCYTES ABSOLUTE: 1.4 E9/L (ref 1.5–4)
LYMPHOCYTES RELATIVE PERCENT: 12.3 % (ref 20–42)
MCH RBC QN AUTO: 22.2 PG (ref 26–35)
MCHC RBC AUTO-ENTMCNC: 30.5 % (ref 32–34.5)
MCV RBC AUTO: 72.7 FL (ref 80–99.9)
METER GLUCOSE: 144 MG/DL (ref 74–99)
METER GLUCOSE: 160 MG/DL (ref 74–99)
METER GLUCOSE: 174 MG/DL (ref 74–99)
METER GLUCOSE: 93 MG/DL (ref 74–99)
MONOCYTES ABSOLUTE: 0.7 E9/L (ref 0.1–0.95)
MONOCYTES RELATIVE PERCENT: 6.1 % (ref 2–12)
NEUTROPHILS ABSOLUTE: 9.01 E9/L (ref 1.8–7.3)
NEUTROPHILS RELATIVE PERCENT: 77.2 % (ref 43–80)
OVALOCYTES: ABNORMAL
PDW BLD-RTO: 20.5 FL (ref 11.5–15)
PLATELET # BLD: 273 E9/L (ref 130–450)
PMV BLD AUTO: 11.1 FL (ref 7–12)
POIKILOCYTES: ABNORMAL
POLYCHROMASIA: ABNORMAL
RBC # BLD: 3.7 E12/L (ref 3.8–5.8)
TARGET CELLS: ABNORMAL
WBC # BLD: 11.7 E9/L (ref 4.5–11.5)

## 2018-11-09 PROCEDURE — 97110 THERAPEUTIC EXERCISES: CPT

## 2018-11-09 PROCEDURE — 97530 THERAPEUTIC ACTIVITIES: CPT

## 2018-11-09 PROCEDURE — 36415 COLL VENOUS BLD VENIPUNCTURE: CPT

## 2018-11-09 PROCEDURE — 36591 DRAW BLOOD OFF VENOUS DEVICE: CPT

## 2018-11-09 PROCEDURE — 36593 DECLOT VASCULAR DEVICE: CPT

## 2018-11-09 PROCEDURE — 94660 CPAP INITIATION&MGMT: CPT

## 2018-11-09 PROCEDURE — 85025 COMPLETE CBC W/AUTO DIFF WBC: CPT

## 2018-11-09 PROCEDURE — 1280000000 HC REHAB R&B

## 2018-11-09 PROCEDURE — 6360000002 HC RX W HCPCS: Performed by: PHYSICAL MEDICINE & REHABILITATION

## 2018-11-09 PROCEDURE — 6370000000 HC RX 637 (ALT 250 FOR IP): Performed by: INTERNAL MEDICINE

## 2018-11-09 PROCEDURE — 82962 GLUCOSE BLOOD TEST: CPT

## 2018-11-09 PROCEDURE — 2580000003 HC RX 258: Performed by: INTERNAL MEDICINE

## 2018-11-09 PROCEDURE — 6370000000 HC RX 637 (ALT 250 FOR IP): Performed by: PHYSICAL MEDICINE & REHABILITATION

## 2018-11-09 RX ORDER — HEPARIN SODIUM (PORCINE) LOCK FLUSH IV SOLN 100 UNIT/ML 100 UNIT/ML
100 SOLUTION INTRAVENOUS 2 TIMES DAILY
Status: DISCONTINUED | OUTPATIENT
Start: 2018-11-09 | End: 2018-11-10

## 2018-11-09 RX ADMIN — ASPIRIN 81 MG CHEWABLE TABLET 81 MG: 81 TABLET CHEWABLE at 08:47

## 2018-11-09 RX ADMIN — INSULIN LISPRO 5 UNITS: 100 INJECTION, SOLUTION INTRAVENOUS; SUBCUTANEOUS at 11:48

## 2018-11-09 RX ADMIN — METOPROLOL TARTRATE 100 MG: 50 TABLET, FILM COATED ORAL at 08:47

## 2018-11-09 RX ADMIN — MORPHINE SULFATE 15 MG: 15 TABLET, FILM COATED, EXTENDED RELEASE ORAL at 20:14

## 2018-11-09 RX ADMIN — Medication 10 ML: at 06:19

## 2018-11-09 RX ADMIN — PREGABALIN 200 MG: 100 CAPSULE ORAL at 14:45

## 2018-11-09 RX ADMIN — AMLODIPINE BESYLATE 10 MG: 10 TABLET ORAL at 08:48

## 2018-11-09 RX ADMIN — ALTEPLASE 2 MG: 2.2 INJECTION, POWDER, LYOPHILIZED, FOR SOLUTION INTRAVENOUS at 11:07

## 2018-11-09 RX ADMIN — PREGABALIN 200 MG: 100 CAPSULE ORAL at 08:47

## 2018-11-09 RX ADMIN — HYDROCODONE BITARTRATE AND ACETAMINOPHEN 2 TABLET: 5; 325 TABLET ORAL at 11:50

## 2018-11-09 RX ADMIN — METOPROLOL TARTRATE 100 MG: 50 TABLET, FILM COATED ORAL at 20:05

## 2018-11-09 RX ADMIN — INSULIN GLARGINE 15 UNITS: 100 INJECTION, SOLUTION SUBCUTANEOUS at 07:25

## 2018-11-09 RX ADMIN — SENNOSIDES AND DOCUSATE SODIUM 2 TABLET: 8.6; 5 TABLET ORAL at 20:07

## 2018-11-09 RX ADMIN — HEPARIN 100 UNITS: 100 SYRINGE at 08:47

## 2018-11-09 RX ADMIN — Medication 10 ML: at 08:46

## 2018-11-09 RX ADMIN — PREGABALIN 200 MG: 100 CAPSULE ORAL at 20:03

## 2018-11-09 RX ADMIN — CLONIDINE HYDROCHLORIDE 0.1 MG: 0.1 TABLET ORAL at 08:47

## 2018-11-09 RX ADMIN — DULOXETINE HYDROCHLORIDE 120 MG: 60 CAPSULE, DELAYED RELEASE ORAL at 08:47

## 2018-11-09 RX ADMIN — INSULIN GLARGINE 15 UNITS: 100 INJECTION, SOLUTION SUBCUTANEOUS at 20:18

## 2018-11-09 RX ADMIN — CLONIDINE HYDROCHLORIDE 0.1 MG: 0.1 TABLET ORAL at 14:45

## 2018-11-09 RX ADMIN — PANTOPRAZOLE SODIUM 40 MG: 40 TABLET, DELAYED RELEASE ORAL at 06:09

## 2018-11-09 RX ADMIN — CLONIDINE HYDROCHLORIDE 0.1 MG: 0.1 TABLET ORAL at 20:04

## 2018-11-09 RX ADMIN — CILOSTAZOL 100 MG: 100 TABLET ORAL at 08:47

## 2018-11-09 RX ADMIN — MORPHINE SULFATE 15 MG: 15 TABLET, FILM COATED, EXTENDED RELEASE ORAL at 08:49

## 2018-11-09 RX ADMIN — INSULIN LISPRO 5 UNITS: 100 INJECTION, SOLUTION INTRAVENOUS; SUBCUTANEOUS at 07:27

## 2018-11-09 RX ADMIN — INSULIN LISPRO 3 UNITS: 100 INJECTION, SOLUTION INTRAVENOUS; SUBCUTANEOUS at 07:26

## 2018-11-09 RX ADMIN — SPIRONOLACTONE 50 MG: 25 TABLET ORAL at 08:47

## 2018-11-09 RX ADMIN — INSULIN LISPRO 3 UNITS: 100 INJECTION, SOLUTION INTRAVENOUS; SUBCUTANEOUS at 11:46

## 2018-11-09 RX ADMIN — SENNOSIDES AND DOCUSATE SODIUM 2 TABLET: 8.6; 5 TABLET ORAL at 08:52

## 2018-11-09 RX ADMIN — ENOXAPARIN SODIUM 40 MG: 40 INJECTION SUBCUTANEOUS at 08:46

## 2018-11-09 RX ADMIN — INSULIN LISPRO 5 UNITS: 100 INJECTION, SOLUTION INTRAVENOUS; SUBCUTANEOUS at 17:07

## 2018-11-09 RX ADMIN — LISINOPRIL 10 MG: 10 TABLET ORAL at 08:47

## 2018-11-09 RX ADMIN — CILOSTAZOL 100 MG: 100 TABLET ORAL at 20:03

## 2018-11-09 ASSESSMENT — PAIN DESCRIPTION - FREQUENCY
FREQUENCY: CONTINUOUS

## 2018-11-09 ASSESSMENT — PAIN DESCRIPTION - LOCATION
LOCATION: LEG

## 2018-11-09 ASSESSMENT — PAIN DESCRIPTION - PAIN TYPE: TYPE: CHRONIC PAIN

## 2018-11-09 ASSESSMENT — PAIN DESCRIPTION - DESCRIPTORS
DESCRIPTORS: ACHING
DESCRIPTORS: ACHING

## 2018-11-09 ASSESSMENT — PAIN SCALES - GENERAL
PAINLEVEL_OUTOF10: 3
PAINLEVEL_OUTOF10: 0
PAINLEVEL_OUTOF10: 9
PAINLEVEL_OUTOF10: 10
PAINLEVEL_OUTOF10: 8
PAINLEVEL_OUTOF10: 0
PAINLEVEL_OUTOF10: 10
PAINLEVEL_OUTOF10: 9

## 2018-11-09 ASSESSMENT — PAIN DESCRIPTION - ORIENTATION
ORIENTATION: RIGHT

## 2018-11-09 ASSESSMENT — PAIN DESCRIPTION - ONSET
ONSET: ON-GOING

## 2018-11-09 NOTE — PROGRESS NOTES
Stephanie Crocker is a 63 y.o. male patient.     Current Facility-Administered Medications   Medication Dose Route Frequency Provider Last Rate Last Dose    enoxaparin (LOVENOX) injection 40 mg  40 mg Subcutaneous Daily Lencho Yadav MD   40 mg at 11/08/18 0943    cloNIDine (CATAPRES) tablet 0.1 mg  0.1 mg Oral TID Lencho Yadav MD   0.1 mg at 11/08/18 2140    lidocaine (LIDODERM) 5 % 1 patch  1 patch Transdermal Daily Lencho Yadav MD   1 patch at 11/07/18 0823    ondansetron (ZOFRAN) injection 4 mg  4 mg Intravenous Q6H PRN [facility], DO        sodium chloride flush 0.9 % injection 10 mL  10 mL Intravenous 2 times per day [facility], DO   10 mL at 11/09/18 0619    dextrose 5 % solution  100 mL/hr Intravenous PRN [facility], DO        dextrose 50 % solution 12.5 g  12.5 g Intravenous PRN [facility], DO        glucagon (rDNA) injection 1 mg  1 mg Intramuscular PRN [facility], DO        glucose (GLUTOSE) 40 % oral gel 15 g  15 g Oral PRN [facility], DO        amLODIPine (NORVASC) tablet 10 mg  [facility]   10 mg at 11/08/18 0932    aspirin chewable tablet 81 mg  81 mg Oral Daily [facility], DO   81 mg at 11/07/18 8656    cilostazol (PLETAL) tablet 100 mg  100 mg Oral BID [facility], DO   100 mg at 11/08/18 2139    DULoxetine (CYMBALTA) extended release capsule 120 mg  120 mg Oral QAM [facility], DO   120 mg at 11/08/18 1152    ferrous sulfate tablet 325 mg  325 mg Oral BID [facility], DO   325 mg at 11/07/18 0824    HYDROcodone-acetaminophen (NORCO) 5-325 MG per tablet 1 tablet  1 tablet Oral Q4H PRN [facility], DO        Or    HYDROcodone-acetaminophen (NORCO) 5-325 MG per tablet 2 tablet  2 tablet Oral Q4H PRN [facility], DO   2 tablet at 11/08/18 1642    insulin glargine (LANTUS) injection vial 15 Units  15 Units Subcutaneous BID [facility], DO   15 Units at 11/09/18 0725    insulin lispro (HUMALOG) injection vial 0-18 Units  0-18 Units Subcutaneous TID  Kayleigh Uziel, DO   3 Units at 11/09/18 0726    insulin lispro (HUMALOG) injection vial 5 Units  5 Units Subcutaneous TID  Kayleigh Uziel, DO   5 Units at 11/09/18 0727    lisinopril (PRINIVIL;ZESTRIL) tablet 10 mg  10 mg Oral Daily Kayleigh Uziel, DO   10 mg at 11/08/18 0932    LORazepam (ATIVAN) tablet 1 mg  1 mg Oral Nightly PRN Kayleigh Uziel, DO        metoprolol tartrate (LOPRESSOR) tablet 100 mg  100 mg Oral BID Kayleigh Dallas, DO   100 mg at 11/08/18 2139    mineral oil-hydrophilic petrolatum (HYDROPHOR) ointment   Topical BID Kayleigh Dallas, DO        morphine (MS CONTIN) extended release tablet 15 mg  15 mg Oral 2 times per day Kayleigh Uziel, DO   15 mg at 11/08/18 2139    pantoprazole (PROTONIX) tablet 40 mg  40 mg Oral QAM AC Sai Drena Pillion, DO   40 mg at 11/09/18 1907    pregabalin (LYRICA) capsule 200 mg  200 mg Oral TID Kayleigh Uziel, DO   200 mg at 11/08/18 2139    sennosides-docusate sodium (SENOKOT-S) 8.6-50 MG tablet 2 tablet  2 tablet Oral BID Kayleigh Dallas, DO   2 tablet at 11/08/18 2138    sodium chloride flush 0.9 % injection 10 mL  10 mL Intravenous PRN Kayleigh Dallas, DO   10 mL at 11/06/18 0735    spironolactone (ALDACTONE) tablet 50 mg  50 mg Oral QAM Kayleigh Dallas, DO   50 mg at 11/08/18 0932    tiZANidine (ZANAFLEX) tablet 4 mg  4 mg Oral Q8H PRN Kayleigh Dallas, DO        acetaminophen (TYLENOL) tablet 650 mg  650 mg Oral Q4H PRN Marissa Hauser MD        magnesium hydroxide (MILK OF MAGNESIA) 400 MG/5ML suspension 30 mL  30 mL Oral Daily PRN Marissa Hauser MD         No Known Allergies  Active Problems:    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 *    Blood pressure (!) 142/77, pulse 61, temperature 98.5 °F (36.9 °C), temperature source Temporal, resp.  rate 20, height 6' 2\" (1.88 m), weight 299 lb 3.2 oz (135.7 kg), SpO2 97 using walker or grab bar. [x] Tub  [] Shower   Transfer 11/7/18 Min A  extended tub bench, ww     Commode   Transfer 11/7/18   Min A  WC, 3 in 1 commode, ww     Functional   Mobility 11/7/18   MIn A  ww Short distance   Other:  Bed>WC     Sit to stand     11/8/18 11/8/18    Min x2     Min assist       ww     ww    Pt completed EOB to w/c transfer using walker with shoe donned to prevent left foot from sliding out while wearing gripper sock.       Pt had difficulty with sit to stand transfers from EOB to walker needing several attempts and bed height adjusted and unsteadiness noted requiring min x2 assist for fall prevention. Pt min x1 in bathroom using grab bar during clothing mgmt/reshma care.            Assessment:    Condition: In stable condition. Improving.   (R AKA). Plan:   Up to wheel chair. (Servin is out and patient voiding  Pain is under better control  Incision healing well  White count is coming down  Blood pressure and blood sugars adequately controlled).        Lencho Yadav MD  11/9/2018

## 2018-11-10 LAB
METER GLUCOSE: 134 MG/DL (ref 74–99)
METER GLUCOSE: 142 MG/DL (ref 74–99)
METER GLUCOSE: 143 MG/DL (ref 74–99)
METER GLUCOSE: 174 MG/DL (ref 74–99)

## 2018-11-10 PROCEDURE — 94660 CPAP INITIATION&MGMT: CPT

## 2018-11-10 PROCEDURE — 6370000000 HC RX 637 (ALT 250 FOR IP): Performed by: PHYSICAL MEDICINE & REHABILITATION

## 2018-11-10 PROCEDURE — 6360000002 HC RX W HCPCS: Performed by: PHYSICAL MEDICINE & REHABILITATION

## 2018-11-10 PROCEDURE — 97530 THERAPEUTIC ACTIVITIES: CPT

## 2018-11-10 PROCEDURE — 6370000000 HC RX 637 (ALT 250 FOR IP): Performed by: INTERNAL MEDICINE

## 2018-11-10 PROCEDURE — 82962 GLUCOSE BLOOD TEST: CPT

## 2018-11-10 PROCEDURE — 1280000000 HC REHAB R&B

## 2018-11-10 PROCEDURE — 97110 THERAPEUTIC EXERCISES: CPT

## 2018-11-10 RX ADMIN — SPIRONOLACTONE 50 MG: 25 TABLET ORAL at 08:10

## 2018-11-10 RX ADMIN — CILOSTAZOL 100 MG: 100 TABLET ORAL at 08:11

## 2018-11-10 RX ADMIN — INSULIN LISPRO 5 UNITS: 100 INJECTION, SOLUTION INTRAVENOUS; SUBCUTANEOUS at 17:05

## 2018-11-10 RX ADMIN — LISINOPRIL 10 MG: 10 TABLET ORAL at 08:11

## 2018-11-10 RX ADMIN — DULOXETINE HYDROCHLORIDE 120 MG: 60 CAPSULE, DELAYED RELEASE ORAL at 08:10

## 2018-11-10 RX ADMIN — CLONIDINE HYDROCHLORIDE 0.1 MG: 0.1 TABLET ORAL at 08:11

## 2018-11-10 RX ADMIN — HYDROCODONE BITARTRATE AND ACETAMINOPHEN 2 TABLET: 5; 325 TABLET ORAL at 17:02

## 2018-11-10 RX ADMIN — PANTOPRAZOLE SODIUM 40 MG: 40 TABLET, DELAYED RELEASE ORAL at 07:05

## 2018-11-10 RX ADMIN — AMLODIPINE BESYLATE 10 MG: 10 TABLET ORAL at 08:11

## 2018-11-10 RX ADMIN — SENNOSIDES AND DOCUSATE SODIUM 2 TABLET: 8.6; 5 TABLET ORAL at 08:09

## 2018-11-10 RX ADMIN — CLONIDINE HYDROCHLORIDE 0.1 MG: 0.1 TABLET ORAL at 14:27

## 2018-11-10 RX ADMIN — PREGABALIN 200 MG: 100 CAPSULE ORAL at 14:28

## 2018-11-10 RX ADMIN — HYDROCODONE BITARTRATE AND ACETAMINOPHEN 2 TABLET: 5; 325 TABLET ORAL at 08:09

## 2018-11-10 RX ADMIN — SENNOSIDES AND DOCUSATE SODIUM 2 TABLET: 8.6; 5 TABLET ORAL at 20:57

## 2018-11-10 RX ADMIN — PREGABALIN 200 MG: 100 CAPSULE ORAL at 20:56

## 2018-11-10 RX ADMIN — PREGABALIN 200 MG: 100 CAPSULE ORAL at 08:09

## 2018-11-10 RX ADMIN — ENOXAPARIN SODIUM 40 MG: 40 INJECTION SUBCUTANEOUS at 08:10

## 2018-11-10 RX ADMIN — INSULIN LISPRO 3 UNITS: 100 INJECTION, SOLUTION INTRAVENOUS; SUBCUTANEOUS at 17:03

## 2018-11-10 RX ADMIN — INSULIN LISPRO 5 UNITS: 100 INJECTION, SOLUTION INTRAVENOUS; SUBCUTANEOUS at 07:05

## 2018-11-10 RX ADMIN — CILOSTAZOL 100 MG: 100 TABLET ORAL at 20:57

## 2018-11-10 RX ADMIN — MORPHINE SULFATE 15 MG: 15 TABLET, FILM COATED, EXTENDED RELEASE ORAL at 08:09

## 2018-11-10 RX ADMIN — METOPROLOL TARTRATE 100 MG: 50 TABLET, FILM COATED ORAL at 20:57

## 2018-11-10 RX ADMIN — MORPHINE SULFATE 15 MG: 15 TABLET, FILM COATED, EXTENDED RELEASE ORAL at 20:58

## 2018-11-10 RX ADMIN — INSULIN LISPRO 5 UNITS: 100 INJECTION, SOLUTION INTRAVENOUS; SUBCUTANEOUS at 11:37

## 2018-11-10 RX ADMIN — INSULIN GLARGINE 15 UNITS: 100 INJECTION, SOLUTION SUBCUTANEOUS at 21:00

## 2018-11-10 RX ADMIN — INSULIN GLARGINE 15 UNITS: 100 INJECTION, SOLUTION SUBCUTANEOUS at 07:05

## 2018-11-10 RX ADMIN — METOPROLOL TARTRATE 100 MG: 50 TABLET, FILM COATED ORAL at 08:10

## 2018-11-10 RX ADMIN — INSULIN LISPRO 3 UNITS: 100 INJECTION, SOLUTION INTRAVENOUS; SUBCUTANEOUS at 11:38

## 2018-11-10 ASSESSMENT — PAIN DESCRIPTION - DESCRIPTORS
DESCRIPTORS: DISCOMFORT;STABBING;SORE
DESCRIPTORS: DISCOMFORT;JABBING;SORE

## 2018-11-10 ASSESSMENT — PAIN DESCRIPTION - PROGRESSION
CLINICAL_PROGRESSION: NOT CHANGED

## 2018-11-10 ASSESSMENT — PAIN DESCRIPTION - ONSET
ONSET: ON-GOING

## 2018-11-10 ASSESSMENT — PAIN DESCRIPTION - PAIN TYPE
TYPE: CHRONIC PAIN

## 2018-11-10 ASSESSMENT — PAIN SCALES - GENERAL
PAINLEVEL_OUTOF10: 0
PAINLEVEL_OUTOF10: 10
PAINLEVEL_OUTOF10: 7
PAINLEVEL_OUTOF10: 5
PAINLEVEL_OUTOF10: 2
PAINLEVEL_OUTOF10: 0
PAINLEVEL_OUTOF10: 0
PAINLEVEL_OUTOF10: 8

## 2018-11-10 ASSESSMENT — PAIN DESCRIPTION - LOCATION
LOCATION: LEG

## 2018-11-10 ASSESSMENT — PAIN DESCRIPTION - ORIENTATION
ORIENTATION: RIGHT

## 2018-11-10 ASSESSMENT — PAIN DESCRIPTION - FREQUENCY
FREQUENCY: CONTINUOUS

## 2018-11-10 NOTE — PROGRESS NOTES
[facility] CLINICS Physical Medicine and Rehabilitation  Progress Note    GENERAL:   Covering for Dr Kalpana Maya. 63 y old male, admitted to the ARU on 11/5, following a right AKA, on 11/1. Up in the chair, no complaints. Right AK residual limb healing, staples in place, no drainage. BP noted. Labs reviewed, leucocytosis improving.     VITALS:   Vitals:    11/09/18 2003 11/10/18 0144 11/10/18 0701 11/10/18 0756   BP: (!) 163/63  108/68 (!) 115/56   Pulse: 76  62 69   Resp: 18  16 16   Temp: 98.8 °F (37.1 °C)  97.8 °F (36.6 °C)    TempSrc: Temporal  Temporal    SpO2: 98% 98% 95%    Weight:       Height:           Scheduled Meds:   heparin flush  100 Units Intercatheter BID    enoxaparin  40 mg Subcutaneous Daily    cloNIDine  0.1 mg Oral TID    lidocaine  1 patch Transdermal Daily    sodium chloride flush  10 mL Intravenous 2 times per day    amLODIPine  10 mg Oral QAM    aspirin  81 mg Oral Daily    cilostazol  100 mg Oral BID    DULoxetine  120 mg Oral QAM    ferrous sulfate  325 mg Oral BID WC    insulin glargine  15 Units Subcutaneous BID    insulin lispro  0-18 Units Subcutaneous TID WC    insulin lispro  5 Units Subcutaneous TID WC    lisinopril  10 mg Oral Daily    metoprolol  100 mg Oral BID    mineral oil-hydrophilic petrolatum   Topical BID    morphine  15 mg Oral 2 times per day    pantoprazole  40 mg Oral QAM AC    pregabalin  200 mg Oral TID    sennosides-docusate sodium  2 tablet Oral BID    spironolactone  50 mg Oral QAM     Continuous Infusions:   dextrose       PRN Meds:.ondansetron, dextrose, dextrose, glucagon (rDNA), glucose, HYDROcodone 5 mg - acetaminophen **OR** HYDROcodone 5 mg - acetaminophen, LORazepam, sodium chloride flush, tiZANidine, acetaminophen, magnesium hydroxide      LABS:  CBC with Differential:    Lab Results   Component Value Date    WBC 11.7 11/09/2018    RBC 3.70 11/09/2018    HGB 8.2 11/09/2018    HCT 26.9 11/09/2018     11/09/2018    MCV 72.7 11/09/2018

## 2018-11-10 NOTE — PROGRESS NOTES
A   Curb Step:   ascended and descended NT NT 4 inch step with AAD and Min A 4 inch step with AAD and SBA   Picking up object off the floor NT NT Will  an object with SBA Will  an object with Supervision   BLE ROM WFL      BLE Strength Grossly 4+/5 in LLE   R residual limb is at least 3/5      Balance  Sitting EOM: SBA  Dynamic Standing: Mila      Date Family Teach Completed No family present at initial evaluation      Is additional Family Teaching Needed? Y or N Yes      Hindering Progress Pain      PT recommended ELOS 2 weeks      Team's Discharge Plan       Therapist at Team Meeting         Balance: fair/fair minus dynamic using bariatric [employer] for support    Pt performed therapeutic exercise of the following: sidelying R hip ABduction x 30 AROM; B seated L LE LAQ's with 5\" wt 20 reps x 2. Pt performed prone propping on mat table promoting R hip extension x 5 minutes, states felt a strong stretch with this. Patient education  Pt was educated on exercise for strengthening, UE usage to assist with stand to sit transfers    Patient response to education:   Pt verbalized understanding Pt demonstrated skill Pt requires further education in this area   yes With instruction yes     Additional Comments: Ban slow and guarded, noted good gliding motion and foot placement L LE as opposed to aggressive hopping motions. Pt fatigued after activity    Time in: 0910  Time out: 0940    Pt is making good progress toward established Physical Therapy goals as per increased gait performed this session. Continue with physical therapy current plan of care.     Elena [facility]   License Number: [id number]

## 2018-11-11 LAB
HCT VFR BLD CALC: 27.8 % (ref 37–54)
HEMOGLOBIN: 8.4 G/DL (ref 12.5–16.5)
MCH RBC QN AUTO: 22.1 PG (ref 26–35)
MCHC RBC AUTO-ENTMCNC: 30.2 % (ref 32–34.5)
MCV RBC AUTO: 73.2 FL (ref 80–99.9)
METER GLUCOSE: 110 MG/DL (ref 74–99)
METER GLUCOSE: 138 MG/DL (ref 74–99)
METER GLUCOSE: 161 MG/DL (ref 74–99)
METER GLUCOSE: 236 MG/DL (ref 74–99)
PDW BLD-RTO: 20.4 FL (ref 11.5–15)
PLATELET # BLD: 273 E9/L (ref 130–450)
PMV BLD AUTO: 10.8 FL (ref 7–12)
RBC # BLD: 3.8 E12/L (ref 3.8–5.8)
WBC # BLD: 10.8 E9/L (ref 4.5–11.5)

## 2018-11-11 PROCEDURE — 6360000002 HC RX W HCPCS: Performed by: PHYSICAL MEDICINE & REHABILITATION

## 2018-11-11 PROCEDURE — 85027 COMPLETE CBC AUTOMATED: CPT

## 2018-11-11 PROCEDURE — 97110 THERAPEUTIC EXERCISES: CPT

## 2018-11-11 PROCEDURE — 1280000000 HC REHAB R&B

## 2018-11-11 PROCEDURE — 6370000000 HC RX 637 (ALT 250 FOR IP): Performed by: PHYSICAL MEDICINE & REHABILITATION

## 2018-11-11 PROCEDURE — 36415 COLL VENOUS BLD VENIPUNCTURE: CPT

## 2018-11-11 PROCEDURE — 2700000000 HC OXYGEN THERAPY PER DAY

## 2018-11-11 PROCEDURE — 6370000000 HC RX 637 (ALT 250 FOR IP): Performed by: INTERNAL MEDICINE

## 2018-11-11 PROCEDURE — 82962 GLUCOSE BLOOD TEST: CPT

## 2018-11-11 RX ADMIN — CLONIDINE HYDROCHLORIDE 0.1 MG: 0.1 TABLET ORAL at 21:10

## 2018-11-11 RX ADMIN — PANTOPRAZOLE SODIUM 40 MG: 40 TABLET, DELAYED RELEASE ORAL at 07:41

## 2018-11-11 RX ADMIN — PREGABALIN 200 MG: 100 CAPSULE ORAL at 08:09

## 2018-11-11 RX ADMIN — LISINOPRIL 10 MG: 10 TABLET ORAL at 08:08

## 2018-11-11 RX ADMIN — INSULIN LISPRO 5 UNITS: 100 INJECTION, SOLUTION INTRAVENOUS; SUBCUTANEOUS at 07:47

## 2018-11-11 RX ADMIN — CILOSTAZOL 100 MG: 100 TABLET ORAL at 08:06

## 2018-11-11 RX ADMIN — PREGABALIN 200 MG: 100 CAPSULE ORAL at 14:15

## 2018-11-11 RX ADMIN — CILOSTAZOL 100 MG: 100 TABLET ORAL at 21:10

## 2018-11-11 RX ADMIN — PETROLATUM: 42 OINTMENT TOPICAL at 08:21

## 2018-11-11 RX ADMIN — DULOXETINE HYDROCHLORIDE 120 MG: 60 CAPSULE, DELAYED RELEASE ORAL at 08:07

## 2018-11-11 RX ADMIN — METOPROLOL TARTRATE 100 MG: 50 TABLET, FILM COATED ORAL at 21:10

## 2018-11-11 RX ADMIN — ENOXAPARIN SODIUM 40 MG: 40 INJECTION SUBCUTANEOUS at 08:14

## 2018-11-11 RX ADMIN — INSULIN LISPRO 5 UNITS: 100 INJECTION, SOLUTION INTRAVENOUS; SUBCUTANEOUS at 12:14

## 2018-11-11 RX ADMIN — SPIRONOLACTONE 50 MG: 25 TABLET ORAL at 08:10

## 2018-11-11 RX ADMIN — LORAZEPAM 1 MG: 1 TABLET ORAL at 21:16

## 2018-11-11 RX ADMIN — INSULIN GLARGINE 15 UNITS: 100 INJECTION, SOLUTION SUBCUTANEOUS at 07:43

## 2018-11-11 RX ADMIN — AMLODIPINE BESYLATE 10 MG: 10 TABLET ORAL at 08:05

## 2018-11-11 RX ADMIN — SENNOSIDES AND DOCUSATE SODIUM 2 TABLET: 8.6; 5 TABLET ORAL at 21:10

## 2018-11-11 RX ADMIN — INSULIN LISPRO 5 UNITS: 100 INJECTION, SOLUTION INTRAVENOUS; SUBCUTANEOUS at 17:13

## 2018-11-11 RX ADMIN — INSULIN LISPRO 3 UNITS: 100 INJECTION, SOLUTION INTRAVENOUS; SUBCUTANEOUS at 12:13

## 2018-11-11 RX ADMIN — INSULIN GLARGINE 15 UNITS: 100 INJECTION, SOLUTION SUBCUTANEOUS at 21:11

## 2018-11-11 RX ADMIN — HYDROCODONE BITARTRATE AND ACETAMINOPHEN 2 TABLET: 5; 325 TABLET ORAL at 19:36

## 2018-11-11 RX ADMIN — METOPROLOL TARTRATE 100 MG: 50 TABLET, FILM COATED ORAL at 08:08

## 2018-11-11 RX ADMIN — MORPHINE SULFATE 15 MG: 15 TABLET, FILM COATED, EXTENDED RELEASE ORAL at 07:58

## 2018-11-11 RX ADMIN — HYDROCODONE BITARTRATE AND ACETAMINOPHEN 2 TABLET: 5; 325 TABLET ORAL at 14:15

## 2018-11-11 RX ADMIN — MORPHINE SULFATE 15 MG: 15 TABLET, FILM COATED, EXTENDED RELEASE ORAL at 21:10

## 2018-11-11 RX ADMIN — SENNOSIDES AND DOCUSATE SODIUM 2 TABLET: 8.6; 5 TABLET ORAL at 08:10

## 2018-11-11 RX ADMIN — PREGABALIN 200 MG: 100 CAPSULE ORAL at 21:11

## 2018-11-11 ASSESSMENT — PAIN DESCRIPTION - PROGRESSION
CLINICAL_PROGRESSION: NOT CHANGED
CLINICAL_PROGRESSION: NOT CHANGED

## 2018-11-11 ASSESSMENT — PAIN SCALES - GENERAL
PAINLEVEL_OUTOF10: 7
PAINLEVEL_OUTOF10: 10
PAINLEVEL_OUTOF10: 10
PAINLEVEL_OUTOF10: 0
PAINLEVEL_OUTOF10: 8
PAINLEVEL_OUTOF10: 7
PAINLEVEL_OUTOF10: 10
PAINLEVEL_OUTOF10: 0
PAINLEVEL_OUTOF10: 8
PAINLEVEL_OUTOF10: 10

## 2018-11-11 ASSESSMENT — PAIN DESCRIPTION - LOCATION
LOCATION: LEG

## 2018-11-11 ASSESSMENT — PAIN DESCRIPTION - PAIN TYPE
TYPE: CHRONIC PAIN
TYPE: CHRONIC PAIN;SURGICAL PAIN
TYPE: CHRONIC PAIN;SURGICAL PAIN
TYPE: CHRONIC PAIN

## 2018-11-11 ASSESSMENT — PAIN DESCRIPTION - DESCRIPTORS
DESCRIPTORS: SORE;DISCOMFORT
DESCRIPTORS: ACHING
DESCRIPTORS: ACHING;SORE;DISCOMFORT
DESCRIPTORS: ACHING;SORE;DISCOMFORT

## 2018-11-11 ASSESSMENT — PAIN DESCRIPTION - ORIENTATION
ORIENTATION: RIGHT

## 2018-11-11 ASSESSMENT — PAIN DESCRIPTION - FREQUENCY
FREQUENCY: CONTINUOUS

## 2018-11-11 ASSESSMENT — PAIN DESCRIPTION - ONSET
ONSET: ON-GOING
ONSET: ON-GOING

## 2018-11-11 NOTE — PROGRESS NOTES
Just an FYI patient has not responded to our calls to set up colonoscopy.  A letter was mailed.     ___________      Socialization: _C__Family  _C__Friends  ___Neighbors       ___Church  ___Volunteer ___Club/Organization                                                     ___Other: ___________    Sports/Exercises:  ___Walking  _P__Football  ___Basketball     ___Golf  ___Baseball  ___Tennis       ___Bowling  ___Other: ___________      Traveling:   ___Global  ___Stateside  ___Local       ___Other: ___________      TV/Radio:   ___Mysteries  ___Comedies ___Romance                                                     ___Game show       ___Sports       ___News                                                      ___Talk show          ___Other: ___________      Sheliatrudy Platt would like to be addressed as Jesus Alberto Jeronimo. Jesus Alberto Jeronimo identified feelings of being depressed. Personal Leisure Profile:   Question Response   Attributes Identify a positive quality: []Ambitious  []Appreciative  []Caring  []Courteous  []Considerate  []Compassionate  []Creative  []Dependable   []Generous  []Honest  []Hard working  []Helpful  []Kind  []Gates   []Wister  []Mannerly  []Patient  []Polite  []Respectful  []Sociable  []Sense of humor  []Thoughtful  [x]Other: _I mind my own business__________    You are very good at Being outside   Awareness Why do you participate in your leisure interest: []Competition  []Creativity  []Concentration  []Exercise  []Enjoyment  []Fun  []Hand/eye Coordination  []Increase confidence  []Learning a new skill  []Relaxation  []Social Interaction  []Supporting one another  []Thinking  [x]Other: _Fresh air,sun__________    Are your leisure activities limited at this time? [x]Yes  []No  Comments: _________    How often do you participate in your leisure interest? When I go outside   Resources Name a restaurant, store or business you frequent? [address] [facility] When are you most happy? Enjoying the day.      Barriers to Leisure Participation:  []Visual   []Jackson  []Cognition/Communication  []Motivation  []Financial

## 2018-11-11 NOTE — PROGRESS NOTES
ID Progress Note                [address], [place], [address] [facility]            Phone [phone]     Fax [phone]      CC:  MRSA bactremia    Subjective: The patient is awake and alert. Now in rehab. Denies complaints. Afebrile, no leukocytosis. 10 ROS otherwise negative unless otherwise specified above. Objective:    Vitals:    11/11/18 0745   BP: 133/77   Pulse: 67   Resp: 18   Temp: 98.1 °F (36.7 °C)   SpO2: 98%     General Appearance:    Awake, alert , no acute distress. HEENT:    Normocephalic,PERRL,neck supple, no JVD, mucosa moist, no thrush   Lungs:     Clear to auscultation bilaterally, no wheeze , crackles   Heart:    Regular rate and rhythm, no murmur   Abdomen:     Soft, non-tender, not distended  bowel sounds present,   Extremities: Rt knee AKA dressing dry and intact    Skin:  dry and warm         Labs:  Recent Labs      11/09/18   0611  11/11/18   0434   WBC  11.7*  10.8   RBC  3.70*  3.80   HGB  8.2*  8.4*   HCT  26.9*  27.8*   MCV  72.7*  73.2*   MCH  22.2*  22.1*   MCHC  30.5*  30.2*   RDW  20.5*  20.4*   PLT  273  273   MPV  11.1  10.8     CMP:    Lab Results   Component Value Date     11/07/2018    K 3.8 11/07/2018     11/07/2018    CO2 27 11/07/2018    BUN 17 11/07/2018    CREATININE 1.5 11/07/2018    GFRAA 58 11/07/2018    LABGLOM 58 11/07/2018    GLUCOSE 116 11/07/2018    PROT 7.2 10/28/2018    LABALBU 2.9 10/28/2018    CALCIUM 9.0 11/07/2018    BILITOT 0.3 10/28/2018    ALKPHOS 105 10/28/2018    AST 14 10/28/2018    ALT 20 10/28/2018          Microbiology :  No results for input(s): BC in the last 72 hours. No results for input(s): Yareli Champion in the last 72 hours. No results for input(s): LABURIN in the last 72 hours. No results for input(s): CULTRESP in the last 72 hours. No results for input(s): WNDABS in the last 72 hours.     Radiology :  No orders to display         URINARY CATHETER OUTPUT (Servin):  [REMOVED] Urethral Catheter Latex 16

## 2018-11-12 LAB
BASOPHILS ABSOLUTE: 0.05 E9/L (ref 0–0.2)
BASOPHILS RELATIVE PERCENT: 0.5 % (ref 0–2)
EOSINOPHILS ABSOLUTE: 0.39 E9/L (ref 0.05–0.5)
EOSINOPHILS RELATIVE PERCENT: 3.6 % (ref 0–6)
HCT VFR BLD CALC: 27.4 % (ref 37–54)
HEMOGLOBIN: 8.3 G/DL (ref 12.5–16.5)
IMMATURE GRANULOCYTES #: 0.07 E9/L
IMMATURE GRANULOCYTES %: 0.6 % (ref 0–5)
LYMPHOCYTES ABSOLUTE: 3.18 E9/L (ref 1.5–4)
LYMPHOCYTES RELATIVE PERCENT: 29.2 % (ref 20–42)
MCH RBC QN AUTO: 22 PG (ref 26–35)
MCHC RBC AUTO-ENTMCNC: 30.3 % (ref 32–34.5)
MCV RBC AUTO: 72.7 FL (ref 80–99.9)
METER GLUCOSE: 102 MG/DL (ref 74–99)
METER GLUCOSE: 112 MG/DL (ref 74–99)
METER GLUCOSE: 145 MG/DL (ref 74–99)
METER GLUCOSE: 163 MG/DL (ref 74–99)
MONOCYTES ABSOLUTE: 0.87 E9/L (ref 0.1–0.95)
MONOCYTES RELATIVE PERCENT: 8 % (ref 2–12)
NEUTROPHILS ABSOLUTE: 6.34 E9/L (ref 1.8–7.3)
NEUTROPHILS RELATIVE PERCENT: 58.1 % (ref 43–80)
PDW BLD-RTO: 20.7 FL (ref 11.5–15)
PLATELET # BLD: 291 E9/L (ref 130–450)
PMV BLD AUTO: 11 FL (ref 7–12)
RBC # BLD: 3.77 E12/L (ref 3.8–5.8)
WBC # BLD: 10.9 E9/L (ref 4.5–11.5)

## 2018-11-12 PROCEDURE — 97530 THERAPEUTIC ACTIVITIES: CPT

## 2018-11-12 PROCEDURE — 36415 COLL VENOUS BLD VENIPUNCTURE: CPT

## 2018-11-12 PROCEDURE — 6370000000 HC RX 637 (ALT 250 FOR IP): Performed by: PHYSICAL MEDICINE & REHABILITATION

## 2018-11-12 PROCEDURE — 6370000000 HC RX 637 (ALT 250 FOR IP): Performed by: INTERNAL MEDICINE

## 2018-11-12 PROCEDURE — 85025 COMPLETE CBC W/AUTO DIFF WBC: CPT

## 2018-11-12 PROCEDURE — 97110 THERAPEUTIC EXERCISES: CPT

## 2018-11-12 PROCEDURE — 82962 GLUCOSE BLOOD TEST: CPT

## 2018-11-12 PROCEDURE — 6360000002 HC RX W HCPCS: Performed by: PHYSICAL MEDICINE & REHABILITATION

## 2018-11-12 PROCEDURE — 1280000000 HC REHAB R&B

## 2018-11-12 PROCEDURE — 97535 SELF CARE MNGMENT TRAINING: CPT

## 2018-11-12 RX ADMIN — CILOSTAZOL 100 MG: 100 TABLET ORAL at 20:59

## 2018-11-12 RX ADMIN — CLONIDINE HYDROCHLORIDE 0.1 MG: 0.1 TABLET ORAL at 08:48

## 2018-11-12 RX ADMIN — PREGABALIN 200 MG: 100 CAPSULE ORAL at 20:59

## 2018-11-12 RX ADMIN — INSULIN LISPRO 3 UNITS: 100 INJECTION, SOLUTION INTRAVENOUS; SUBCUTANEOUS at 11:47

## 2018-11-12 RX ADMIN — SENNOSIDES AND DOCUSATE SODIUM 2 TABLET: 8.6; 5 TABLET ORAL at 08:47

## 2018-11-12 RX ADMIN — INSULIN LISPRO 5 UNITS: 100 INJECTION, SOLUTION INTRAVENOUS; SUBCUTANEOUS at 11:47

## 2018-11-12 RX ADMIN — AMLODIPINE BESYLATE 10 MG: 10 TABLET ORAL at 08:48

## 2018-11-12 RX ADMIN — METOPROLOL TARTRATE 100 MG: 50 TABLET, FILM COATED ORAL at 20:59

## 2018-11-12 RX ADMIN — PREGABALIN 200 MG: 100 CAPSULE ORAL at 14:04

## 2018-11-12 RX ADMIN — SPIRONOLACTONE 50 MG: 25 TABLET ORAL at 08:47

## 2018-11-12 RX ADMIN — DULOXETINE HYDROCHLORIDE 120 MG: 60 CAPSULE, DELAYED RELEASE ORAL at 08:47

## 2018-11-12 RX ADMIN — INSULIN GLARGINE 15 UNITS: 100 INJECTION, SOLUTION SUBCUTANEOUS at 07:22

## 2018-11-12 RX ADMIN — INSULIN GLARGINE 15 UNITS: 100 INJECTION, SOLUTION SUBCUTANEOUS at 21:15

## 2018-11-12 RX ADMIN — INSULIN LISPRO 5 UNITS: 100 INJECTION, SOLUTION INTRAVENOUS; SUBCUTANEOUS at 07:24

## 2018-11-12 RX ADMIN — LISINOPRIL 10 MG: 10 TABLET ORAL at 08:48

## 2018-11-12 RX ADMIN — PANTOPRAZOLE SODIUM 40 MG: 40 TABLET, DELAYED RELEASE ORAL at 06:16

## 2018-11-12 RX ADMIN — SENNOSIDES AND DOCUSATE SODIUM 2 TABLET: 8.6; 5 TABLET ORAL at 20:59

## 2018-11-12 RX ADMIN — MORPHINE SULFATE 15 MG: 15 TABLET, FILM COATED, EXTENDED RELEASE ORAL at 08:47

## 2018-11-12 RX ADMIN — PREGABALIN 200 MG: 100 CAPSULE ORAL at 08:47

## 2018-11-12 RX ADMIN — MORPHINE SULFATE 15 MG: 15 TABLET, FILM COATED, EXTENDED RELEASE ORAL at 20:59

## 2018-11-12 RX ADMIN — CLONIDINE HYDROCHLORIDE 0.1 MG: 0.1 TABLET ORAL at 14:04

## 2018-11-12 RX ADMIN — HYDROCODONE BITARTRATE AND ACETAMINOPHEN 2 TABLET: 5; 325 TABLET ORAL at 14:04

## 2018-11-12 RX ADMIN — ENOXAPARIN SODIUM 40 MG: 40 INJECTION SUBCUTANEOUS at 08:47

## 2018-11-12 RX ADMIN — CILOSTAZOL 100 MG: 100 TABLET ORAL at 08:48

## 2018-11-12 RX ADMIN — INSULIN LISPRO 5 UNITS: 100 INJECTION, SOLUTION INTRAVENOUS; SUBCUTANEOUS at 17:47

## 2018-11-12 RX ADMIN — METOPROLOL TARTRATE 100 MG: 50 TABLET, FILM COATED ORAL at 08:48

## 2018-11-12 ASSESSMENT — PAIN DESCRIPTION - FREQUENCY
FREQUENCY: CONTINUOUS
FREQUENCY: CONTINUOUS

## 2018-11-12 ASSESSMENT — PAIN SCALES - GENERAL
PAINLEVEL_OUTOF10: 0
PAINLEVEL_OUTOF10: 5
PAINLEVEL_OUTOF10: 0
PAINLEVEL_OUTOF10: 9
PAINLEVEL_OUTOF10: 8
PAINLEVEL_OUTOF10: 9
PAINLEVEL_OUTOF10: 7
PAINLEVEL_OUTOF10: 8

## 2018-11-12 ASSESSMENT — PAIN DESCRIPTION - ONSET
ONSET: ON-GOING
ONSET: ON-GOING

## 2018-11-12 ASSESSMENT — PAIN DESCRIPTION - ORIENTATION
ORIENTATION: RIGHT
ORIENTATION: RIGHT

## 2018-11-12 ASSESSMENT — PAIN DESCRIPTION - LOCATION
LOCATION: LEG
LOCATION: LEG

## 2018-11-12 ASSESSMENT — PAIN DESCRIPTION - DESCRIPTORS
DESCRIPTORS: ACHING;DISCOMFORT;SORE
DESCRIPTORS: ACHING;DISCOMFORT;SORE

## 2018-11-12 ASSESSMENT — PAIN DESCRIPTION - PAIN TYPE
TYPE: CHRONIC PAIN;SURGICAL PAIN
TYPE: CHRONIC PAIN;SURGICAL PAIN

## 2018-11-12 NOTE — PROGRESS NOTES
LLE   R residual limb is at least 3/5     Balance  Sitting EOM: SBA  Dynamic Standing: Mila Sitting EOM: Independent  Dynamic Standing: CGA     Date Family Teach Completed No family present at initial evaluation No family present to this date     Is additional Family Teaching Needed? Y or N Yes Yes     Hindering Progress Pain Pain     PT recommended ELOS 2 weeks Ready to be discharged 11/14/18. Team's Discharge Plan  Discharge 11/14/18 after FT with D-I-L     Therapist at Team Meeting  Thang Sullivan, PT, DPT [id number]          Date:  11/13/18  Supporting factors:  Good strength in LLE, Motivated   Barriers to discharge:  Pain  Additional comments:  Pt has made good progress since initial evaluation and is able to complete most functional tasks with Modified [place]. Pt still requires min assistance with stair negotiation to place tub seat, but pt plans to have ramp installed at discharge. DME:  Darling HOGAN, ramp  After Care:  Tequila Sotomayor, [facility]   Platter.  Anirudh Milagro, [address]  number:  [phone]

## 2018-11-12 NOTE — PROGRESS NOTES
injection vial 5 Units  5 Units Subcutaneous TID WC [facility], DO   5 Units at 11/12/18 0724    lisinopril (PRINIVIL;ZESTRIL) tablet 10 mg  10 mg Oral Daily [facility], DO   10 mg at 11/11/18 0397    LORazepam (ATIVAN) tablet 1 mg  1 mg Oral Nightly PRN [facility], DO   1 mg at 11/11/18 2116    metoprolol tartrate (LOPRESSOR) tablet 100 mg  100 mg Oral BID [facility], DO   100 mg at 11/11/18 2110    mineral oil-hydrophilic petrolatum (HYDROPHOR) ointment   Topical BID [facility], DO        morphine (MS CONTIN) extended release tablet 15 mg  15 mg Oral 2 times per day [facility], DO   15 mg at 11/11/18 2110    pantoprazole (PROTONIX) tablet 40 mg  40 mg [facility]AA[facility], DO   40 mg at 11/12/18 8124    pregabalin (LYRICA) capsule 200 mg  200 mg Oral TID [facility], DO   200 mg at 11/11/18 2111    sennosides-docusate sodium (SENOKOT-S) 8.6-50 MG tablet 2 tablet  2 tablet Oral BID [facility], DO   2 tablet at 11/11/18 2110    spironolactone (ALDACTONE) tablet 50 mg  50 mg Oral QAM [facility], DO   50 mg at 11/11/18 0810    tiZANidine (ZANAFLEX) tablet 4 mg  4 mg Oral Q8H PRN [facility], DO        acetaminophen (TYLENOL) tablet 650 mg  650 mg Oral Q4H PRN Ellen Fitzgerald MD        magnesium hydroxide (MILK OF MAGNESIA) 400 MG/5ML suspension 30 mL  30 mL Oral Daily PRN Ellen Fitzgerald MD         No Known Allergies  Active Problems:    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 *    Blood pressure (!) 142/75, pulse 61, temperature 98.4 °F (36.9 °C), temperature source Temporal, resp. rate 16, height 6' 2\" (1.88 m), weight 299 lb 3.2 oz (135.7 kg), SpO2 97 %. Subjective:  Symptoms:  Stable. He reports weakness. Diet:  Adequate intake. Activity level: Impaired due to weakness. Pain:  He complains of pain that is mild. Objective:  General Appearance: In no acute distress. Vital signs: (most recent): Blood pressure (!) 142/75, pulse 61, temperature 98.4 °F (36.9 °C), temperature source Temporal, resp. rate 16, height 6' 2\" (1.88 m), weight 299 lb 3.2 oz (135.7 kg), SpO2 97 %. Vital signs are normal.    Output: Producing urine and producing stool. Lungs:  Normal effort and normal respiratory rate. Breath sounds clear to auscultation. Heart: Normal rate. Regular rhythm. S1 normal and S2 normal.    Abdomen: Abdomen is soft. Bowel sounds are normal.   There is no abdominal tenderness. Extremities: Decreased range of motion. There is deformity. Neurological: Patient is alert. (4/5 str). Assessment:    Condition: In stable condition. Improving.   (  R AKA). Plan:   Up to wheel chair. (Incision healing  Pain is adequately controlled  Bowels are moving  Participating better).        Skyler Ware MD  11/12/2018

## 2018-11-12 NOTE — PROGRESS NOTES
Occupational Therapy  OCCUPATIONAL THERAPY DAILY NOTE    Date:2018  Patient Name: Rae Louis  MRN: [id number]  : 1955  Room: [address] [place] [address]A       Diagnosis: s/p R AKA due to osteomyelitis  Precautions: Fall Risk, NWB RLE    Functional Assessment:   Date Status AE  Comments   Feeding 18 Independent     Grooming 18 Mod I   Washed face, applied deodorant. Bathing 18 Supervision  UB bathing completed this date. Pt declined washing LB stating he uses the wipes to clean while on the toilet. UB Dressing 18 Mod I  [employer]. LB Dressing 18 SBA  Thee L sock, L shoe and pants. Pt stood at sink to pull pants over their hips. Homemaking 18 Supervision WC      Functional Transfers / Balance:   Date Status DME  Comments   Sit Balance 18 SBA w/c During exercise   Stand Balance 18 SBA ww and grab bar    [x] Tub  [] Shower   Transfer 18 SBA extended tub bench, ww    Commode   Transfer 18 SBA WC, 3 in 1 commode, ww    Functional   Mobility 18 SBA  ww    Other:  Bed>    Sit to stand    18   SBA    SBA     ww    ww   SBA for safety     Functional Exercises / Activity:  Mod resistive radha bar 2 X 25 on 2 planes to increase BUE forearm, wrist and  strength for independence with functional tasks and transfers    Leisure task with focus on stand balance. Pt participated in checkers activity standing throughout task with SBA. Pt demo'd fair stand tolerance. Sensory / Neuromuscular Re-Education:      Cognitive Skills:   Status Comments   Problem   Solving good     Memory good     Sequencing good     Safety good minus      Visual Perception:    Education:  Pt educated on safety during sit to stands    [] Family teach completed on:    Pain Level: Additional Notes:   Pt given amputee chair cushion to provide support to RLE and decrease pain.      Patient has made good  progress during treatment sessions toward set

## 2018-11-13 LAB
METER GLUCOSE: 139 MG/DL (ref 74–99)
METER GLUCOSE: 143 MG/DL (ref 74–99)
METER GLUCOSE: 145 MG/DL (ref 74–99)
METER GLUCOSE: 162 MG/DL (ref 74–99)

## 2018-11-13 PROCEDURE — 97110 THERAPEUTIC EXERCISES: CPT

## 2018-11-13 PROCEDURE — 6370000000 HC RX 637 (ALT 250 FOR IP): Performed by: INTERNAL MEDICINE

## 2018-11-13 PROCEDURE — 6360000002 HC RX W HCPCS: Performed by: PHYSICAL MEDICINE & REHABILITATION

## 2018-11-13 PROCEDURE — 97530 THERAPEUTIC ACTIVITIES: CPT

## 2018-11-13 PROCEDURE — 6370000000 HC RX 637 (ALT 250 FOR IP): Performed by: PHYSICAL MEDICINE & REHABILITATION

## 2018-11-13 PROCEDURE — 1280000000 HC REHAB R&B

## 2018-11-13 PROCEDURE — 82962 GLUCOSE BLOOD TEST: CPT

## 2018-11-13 RX ADMIN — LISINOPRIL 10 MG: 10 TABLET ORAL at 09:12

## 2018-11-13 RX ADMIN — METOPROLOL TARTRATE 100 MG: 50 TABLET, FILM COATED ORAL at 21:03

## 2018-11-13 RX ADMIN — PANTOPRAZOLE SODIUM 40 MG: 40 TABLET, DELAYED RELEASE ORAL at 06:52

## 2018-11-13 RX ADMIN — INSULIN LISPRO 5 UNITS: 100 INJECTION, SOLUTION INTRAVENOUS; SUBCUTANEOUS at 16:45

## 2018-11-13 RX ADMIN — CILOSTAZOL 100 MG: 100 TABLET ORAL at 09:12

## 2018-11-13 RX ADMIN — INSULIN GLARGINE 15 UNITS: 100 INJECTION, SOLUTION SUBCUTANEOUS at 21:04

## 2018-11-13 RX ADMIN — LORAZEPAM 1 MG: 1 TABLET ORAL at 21:03

## 2018-11-13 RX ADMIN — INSULIN LISPRO 3 UNITS: 100 INJECTION, SOLUTION INTRAVENOUS; SUBCUTANEOUS at 06:56

## 2018-11-13 RX ADMIN — SENNOSIDES AND DOCUSATE SODIUM 2 TABLET: 8.6; 5 TABLET ORAL at 21:03

## 2018-11-13 RX ADMIN — INSULIN LISPRO 5 UNITS: 100 INJECTION, SOLUTION INTRAVENOUS; SUBCUTANEOUS at 11:49

## 2018-11-13 RX ADMIN — MORPHINE SULFATE 15 MG: 15 TABLET, FILM COATED, EXTENDED RELEASE ORAL at 09:11

## 2018-11-13 RX ADMIN — PREGABALIN 200 MG: 100 CAPSULE ORAL at 21:01

## 2018-11-13 RX ADMIN — INSULIN GLARGINE 15 UNITS: 100 INJECTION, SOLUTION SUBCUTANEOUS at 06:54

## 2018-11-13 RX ADMIN — METOPROLOL TARTRATE 100 MG: 50 TABLET, FILM COATED ORAL at 09:12

## 2018-11-13 RX ADMIN — CLONIDINE HYDROCHLORIDE 0.1 MG: 0.1 TABLET ORAL at 21:02

## 2018-11-13 RX ADMIN — CILOSTAZOL 100 MG: 100 TABLET ORAL at 21:02

## 2018-11-13 RX ADMIN — ENOXAPARIN SODIUM 40 MG: 40 INJECTION SUBCUTANEOUS at 09:12

## 2018-11-13 RX ADMIN — SPIRONOLACTONE 50 MG: 25 TABLET ORAL at 09:12

## 2018-11-13 RX ADMIN — DULOXETINE HYDROCHLORIDE 120 MG: 60 CAPSULE, DELAYED RELEASE ORAL at 09:12

## 2018-11-13 RX ADMIN — INSULIN LISPRO 5 UNITS: 100 INJECTION, SOLUTION INTRAVENOUS; SUBCUTANEOUS at 06:59

## 2018-11-13 RX ADMIN — PREGABALIN 200 MG: 100 CAPSULE ORAL at 14:41

## 2018-11-13 RX ADMIN — MORPHINE SULFATE 15 MG: 15 TABLET, FILM COATED, EXTENDED RELEASE ORAL at 21:02

## 2018-11-13 RX ADMIN — PREGABALIN 200 MG: 100 CAPSULE ORAL at 09:11

## 2018-11-13 RX ADMIN — SENNOSIDES AND DOCUSATE SODIUM 2 TABLET: 8.6; 5 TABLET ORAL at 09:12

## 2018-11-13 RX ADMIN — INSULIN LISPRO 3 UNITS: 100 INJECTION, SOLUTION INTRAVENOUS; SUBCUTANEOUS at 16:42

## 2018-11-13 RX ADMIN — CLONIDINE HYDROCHLORIDE 0.1 MG: 0.1 TABLET ORAL at 14:41

## 2018-11-13 RX ADMIN — AMLODIPINE BESYLATE 10 MG: 10 TABLET ORAL at 09:12

## 2018-11-13 ASSESSMENT — PAIN SCALES - GENERAL
PAINLEVEL_OUTOF10: 9
PAINLEVEL_OUTOF10: 0
PAINLEVEL_OUTOF10: 9
PAINLEVEL_OUTOF10: 0
PAINLEVEL_OUTOF10: 9
PAINLEVEL_OUTOF10: 10

## 2018-11-13 ASSESSMENT — PAIN DESCRIPTION - ORIENTATION
ORIENTATION: RIGHT
ORIENTATION: RIGHT

## 2018-11-13 ASSESSMENT — PAIN DESCRIPTION - LOCATION
LOCATION: LEG
LOCATION: BACK;LEG

## 2018-11-13 ASSESSMENT — PAIN DESCRIPTION - PAIN TYPE: TYPE: CHRONIC PAIN;SURGICAL PAIN

## 2018-11-13 ASSESSMENT — PAIN DESCRIPTION - FREQUENCY: FREQUENCY: CONTINUOUS

## 2018-11-13 ASSESSMENT — PAIN DESCRIPTION - ONSET: ONSET: ON-GOING

## 2018-11-13 ASSESSMENT — PAIN DESCRIPTION - DESCRIPTORS
DESCRIPTORS: ACHING;DISCOMFORT;SORE
DESCRIPTORS: ACHING;CONSTANT

## 2018-11-13 NOTE — PROGRESS NOTES
Physical Therapy    [facility]  [facility]    NAME: Raf Atkins  : 1955  MRN: [id number]    Date of Service: 2018   Evaluating Therapist: Nataliya Kaye PRAJ    ROOM: [facility]  DIAGNOSIS: s/p R AKA  PRECAUTIONS: fall risk  PROCEDURE(S): R AKA (18)    Social: Plan is to be D/C with daughter-in-law in a 2 floor plan (1st floor set up) with 3 steps and 1 rail to enter. Pt resides in a 1 floor plan apartment with 10 steps and 1 rail to enter. Prior to admission pt ambulated with a single point cane as needed. Initial Evaluation  Date: 18 AM     PM    Short Term Goals Long Term Goals    Was pt agreeable to Eval/treatment? Yes Yes Yes     Does pt have pain?  9/10 stump pain 8/10 stump pain 7/10 stump pain     Bed Mobility  Rolling: SBA  Supine to sit: SBA  Sit to supine: SBA  Scooting: SBA N/T  Modified Independent Supervision Modified Independent   Transfers Sit to stand: Mila  Stand to sit: SBA  Stand pivot: Mila with Wide [employer]  Slide board transfer: SBA Sit to stand: Modified Independent  Stand to sit: Modified Independent  Stand pivot: Modified Independent with Wide WW Sit to stand: Modified Independent  Stand to sit: Modified Independent  Stand pivot: Modified Independent with Wide [employer] Supervision Modified Independent   Ambulation   10 feet with Wide WW with Mila 7 feet x1 with ww CGA  See comments 25 feet with wide WW with SBA 25 feet with AAD with Supervision >50 feet with AAD with Modified [place]   Walking 10 feet on uneven surface NT NT NT 10 feet with AAD with SBA 10 feet with AAD with Modified [place]   Wheel Chair Mobility 150 feet propelling with BUE and LLE with Supervision 55 feet propelling with BUE  with Modified Independent   300+ feet propelling with BUE and LLE up and down a ramp with Modified [place] 250 feet propelling with BUE and LLE with Supervision >300 feet propelling with BUE and LLE with Modified Henry   Car Transfers NT  SBA  + Handybar with stand pivot with wide [employer] SBA Modified Independent   Stair negotiation: ascended and descended NT N/T 4 steps with 1 rail + tub seat with Makenzie 3 steps with 1 rail + tub seat with Mod A >4 steps with 1 rail + tub seat with Min A   Curb Step:   ascended and descended NT NT NT 4 inch step with AAD and Min A 4 inch step with AAD and SBA   Picking up object off the floor NT NT NT Will  an object with SBA Will  an object with Supervision   BLE ROM [facility]  WF     BLE Strength Grossly 4+/5 in LLE   R residual limb is at least 3/5  Grossly 4+/5 in LLE   R residual limb is at least 3/5     Balance  Sitting EOM: SBA  Dynamic Standing: Makenzie  Sitting EOM: Independent  Dynamic Standing: SBA     Date Family Teach Completed No family present at initial evaluation  No family present to this date     Is additional Family Teaching Needed? Y or N Yes  Yes     Hindering Progress Pain  Pain     PT recommended ELOS 2 weeks       Team's Discharge Plan        Therapist at Team Meeting          Therapeutic Exercise:   AM:   N/T pt refused    Additional Comments:Pt very confrontational this a.m. But agreed after encouragement to come to P.T. Gym . Pt wheeled self with B Ues to P.T. But refused to amb. Set up mat table for ex that pt reluntactly agreed to. Pt reports pain this a.m. With R LE pain. Pt requires CGA during amb with ww. Pt transferred independently to ww and stated slowly amb toward mat table. With therapist giving CGA assist for safety as pt has occ had LOB and posterior leans. After about 7 feet pt became belligerent to therapist and demanding that therapist not put hands on him. Several attempts to explain to pt that he is CGA and requires hands on for safety. Pt kept interrupting therapist and yelling at therapist. Therapist asked pt to return to the w/c which was brought up to pt and pt refused.  Other therapists stepped in to also tell pt to  return to w/c or to be properly

## 2018-11-13 NOTE — PROGRESS NOTES
progress during treatment sessions toward set goals. Therapy emphasis to obtain goals: Gait Training, Patient/Caregiver Education & Training, Home Management Training, Equipment Evaluation, Education, & procurement, Balance Training, Functional Mobility Training, Endurance Training, Wheelchair Mobility Training, Safety Education & Training, Self-Care / ADL    [x] Continue with current OT Plan of care. [] Prepare for Discharge     DISCHARGE RECOMMENDATIONS  Recommended DME:    Post Discharge Care:   []Home Independently  []Home with 24hr Care / Supervision []Home with Partial Supervision [x]Home with Home Health OT and assist as needed  []Home with Out Pt OT []Other: ___   Comments:         Time in Time out Tx Time Breakdown  Variance:   First Session  10:45 11:30 [] Individual Tx-    [x] Concurrent Tx - 45 Mins  [] Co-Tx -   [] Group Tx -   [] Time Missed -     Second Session 1:00 1:45 [x] Individual Tx- 45 Mins  [] Concurrent Tx -    Co-Tx -   [] Group Tx -   [] Time Missed -     Third Session    [] Individual Tx-   [] Concurrent Tx -    [] Co-Tx -   [] Group Tx -   [] Time Missed -         Total Tx Time  90 Mins   Jared Bernard BUCK/L [id number]  I have read the above note and agree with the documentation.   Torie Tipton OTR/L [id number]

## 2018-11-14 VITALS
WEIGHT: 299.2 LBS | OXYGEN SATURATION: 96 % | HEIGHT: 74 IN | RESPIRATION RATE: 18 BRPM | TEMPERATURE: 98.3 F | BODY MASS INDEX: 38.4 KG/M2 | SYSTOLIC BLOOD PRESSURE: 128 MMHG | HEART RATE: 62 BPM | DIASTOLIC BLOOD PRESSURE: 61 MMHG

## 2018-11-14 LAB
ANISOCYTOSIS: ABNORMAL
BASOPHILS ABSOLUTE: 0 E9/L (ref 0–0.2)
BASOPHILS RELATIVE PERCENT: 0.6 % (ref 0–2)
EOSINOPHILS ABSOLUTE: 0.63 E9/L (ref 0.05–0.5)
EOSINOPHILS RELATIVE PERCENT: 6 % (ref 0–6)
HCT VFR BLD CALC: 26.1 % (ref 37–54)
HEMOGLOBIN: 8.1 G/DL (ref 12.5–16.5)
HYPOCHROMIA: ABNORMAL
LYMPHOCYTES ABSOLUTE: 2.52 E9/L (ref 1.5–4)
LYMPHOCYTES RELATIVE PERCENT: 24.1 % (ref 20–42)
MCH RBC QN AUTO: 22.4 PG (ref 26–35)
MCHC RBC AUTO-ENTMCNC: 31 % (ref 32–34.5)
MCV RBC AUTO: 72.3 FL (ref 80–99.9)
METER GLUCOSE: 118 MG/DL (ref 74–99)
METER GLUCOSE: 144 MG/DL (ref 74–99)
MONOCYTES ABSOLUTE: 0.52 E9/L (ref 0.1–0.95)
MONOCYTES RELATIVE PERCENT: 5.2 % (ref 2–12)
NEUTROPHILS ABSOLUTE: 6.83 E9/L (ref 1.8–7.3)
NEUTROPHILS RELATIVE PERCENT: 64.7 % (ref 43–80)
OVALOCYTES: ABNORMAL
PDW BLD-RTO: 20.7 FL (ref 11.5–15)
PLATELET # BLD: 274 E9/L (ref 130–450)
PMV BLD AUTO: 10.7 FL (ref 7–12)
POLYCHROMASIA: ABNORMAL
RBC # BLD: 3.61 E12/L (ref 3.8–5.8)
WBC # BLD: 10.5 E9/L (ref 4.5–11.5)

## 2018-11-14 PROCEDURE — 6360000002 HC RX W HCPCS: Performed by: PHYSICAL MEDICINE & REHABILITATION

## 2018-11-14 PROCEDURE — 97530 THERAPEUTIC ACTIVITIES: CPT

## 2018-11-14 PROCEDURE — 85025 COMPLETE CBC W/AUTO DIFF WBC: CPT

## 2018-11-14 PROCEDURE — 6370000000 HC RX 637 (ALT 250 FOR IP): Performed by: INTERNAL MEDICINE

## 2018-11-14 PROCEDURE — 6370000000 HC RX 637 (ALT 250 FOR IP): Performed by: PHYSICAL MEDICINE & REHABILITATION

## 2018-11-14 PROCEDURE — 36415 COLL VENOUS BLD VENIPUNCTURE: CPT

## 2018-11-14 PROCEDURE — 82962 GLUCOSE BLOOD TEST: CPT

## 2018-11-14 RX ORDER — DULOXETIN HYDROCHLORIDE 60 MG/1
120 CAPSULE, DELAYED RELEASE ORAL EVERY MORNING
Qty: 30 CAPSULE | Refills: 3 | Status: SHIPPED | OUTPATIENT
Start: 2018-11-14 | End: 2019-06-14 | Stop reason: SDUPTHER

## 2018-11-14 RX ORDER — FERROUS SULFATE 325(65) MG
325 TABLET ORAL 2 TIMES DAILY WITH MEALS
Qty: 30 TABLET | Refills: 3 | Status: ON HOLD
Start: 2018-11-14 | End: 2020-06-23 | Stop reason: HOSPADM

## 2018-11-14 RX ORDER — HYDROCODONE BITARTRATE AND ACETAMINOPHEN 5; 325 MG/1; MG/1
1 TABLET ORAL EVERY 4 HOURS PRN
Qty: 120 TABLET | Refills: 0 | Status: SHIPPED | OUTPATIENT
Start: 2018-11-14 | End: 2018-12-12 | Stop reason: SDUPTHER

## 2018-11-14 RX ORDER — LIDOCAINE 50 MG/G
1 PATCH TOPICAL DAILY
Qty: 30 PATCH | Refills: 0 | Status: ON HOLD | OUTPATIENT
Start: 2018-11-14 | End: 2020-04-16

## 2018-11-14 RX ORDER — PREGABALIN 200 MG/1
200 CAPSULE ORAL 2 TIMES DAILY
Qty: 60 CAPSULE | Refills: 3 | Status: SHIPPED | OUTPATIENT
Start: 2018-11-14 | End: 2018-12-12

## 2018-11-14 RX ORDER — CLONIDINE HYDROCHLORIDE 0.1 MG/1
0.1 TABLET ORAL 3 TIMES DAILY
Qty: 60 TABLET | Refills: 3 | Status: SHIPPED | OUTPATIENT
Start: 2018-11-14 | End: 2019-06-14 | Stop reason: SDUPTHER

## 2018-11-14 RX ADMIN — INSULIN LISPRO 3 UNITS: 100 INJECTION, SOLUTION INTRAVENOUS; SUBCUTANEOUS at 12:27

## 2018-11-14 RX ADMIN — INSULIN LISPRO 5 UNITS: 100 INJECTION, SOLUTION INTRAVENOUS; SUBCUTANEOUS at 12:35

## 2018-11-14 RX ADMIN — SPIRONOLACTONE 50 MG: 25 TABLET ORAL at 08:50

## 2018-11-14 RX ADMIN — CILOSTAZOL 100 MG: 100 TABLET ORAL at 08:48

## 2018-11-14 RX ADMIN — INSULIN GLARGINE 15 UNITS: 100 INJECTION, SOLUTION SUBCUTANEOUS at 07:36

## 2018-11-14 RX ADMIN — AMLODIPINE BESYLATE 10 MG: 10 TABLET ORAL at 08:48

## 2018-11-14 RX ADMIN — ENOXAPARIN SODIUM 40 MG: 40 INJECTION SUBCUTANEOUS at 08:48

## 2018-11-14 RX ADMIN — CLONIDINE HYDROCHLORIDE 0.1 MG: 0.1 TABLET ORAL at 08:47

## 2018-11-14 RX ADMIN — PREGABALIN 200 MG: 100 CAPSULE ORAL at 08:47

## 2018-11-14 RX ADMIN — PANTOPRAZOLE SODIUM 40 MG: 40 TABLET, DELAYED RELEASE ORAL at 06:24

## 2018-11-14 RX ADMIN — INSULIN LISPRO 5 UNITS: 100 INJECTION, SOLUTION INTRAVENOUS; SUBCUTANEOUS at 07:38

## 2018-11-14 RX ADMIN — SENNOSIDES AND DOCUSATE SODIUM 2 TABLET: 8.6; 5 TABLET ORAL at 08:48

## 2018-11-14 RX ADMIN — LISINOPRIL 10 MG: 10 TABLET ORAL at 08:47

## 2018-11-14 RX ADMIN — METOPROLOL TARTRATE 100 MG: 50 TABLET, FILM COATED ORAL at 08:47

## 2018-11-14 RX ADMIN — DULOXETINE HYDROCHLORIDE 120 MG: 60 CAPSULE, DELAYED RELEASE ORAL at 08:47

## 2018-11-14 RX ADMIN — MORPHINE SULFATE 15 MG: 15 TABLET, FILM COATED, EXTENDED RELEASE ORAL at 08:47

## 2018-11-14 RX ADMIN — HYDROCODONE BITARTRATE AND ACETAMINOPHEN 2 TABLET: 5; 325 TABLET ORAL at 06:23

## 2018-11-14 ASSESSMENT — PAIN DESCRIPTION - ORIENTATION
ORIENTATION: RIGHT;LOWER
ORIENTATION: RIGHT;LOWER

## 2018-11-14 ASSESSMENT — PAIN DESCRIPTION - PAIN TYPE
TYPE: CHRONIC PAIN
TYPE: CHRONIC PAIN

## 2018-11-14 ASSESSMENT — PAIN DESCRIPTION - LOCATION
LOCATION: LEG
LOCATION: LEG

## 2018-11-14 ASSESSMENT — PAIN DESCRIPTION - ONSET
ONSET: ON-GOING
ONSET: ON-GOING

## 2018-11-14 ASSESSMENT — PAIN DESCRIPTION - PROGRESSION
CLINICAL_PROGRESSION: GRADUALLY IMPROVING
CLINICAL_PROGRESSION: GRADUALLY IMPROVING

## 2018-11-14 ASSESSMENT — PAIN DESCRIPTION - DESCRIPTORS
DESCRIPTORS: ACHING;DISCOMFORT
DESCRIPTORS: ACHING;DISCOMFORT;SHARP

## 2018-11-14 ASSESSMENT — PAIN SCALES - GENERAL
PAINLEVEL_OUTOF10: 10
PAINLEVEL_OUTOF10: 0
PAINLEVEL_OUTOF10: 8
PAINLEVEL_OUTOF10: 7
PAINLEVEL_OUTOF10: 10
PAINLEVEL_OUTOF10: 0

## 2018-11-14 ASSESSMENT — PAIN DESCRIPTION - FREQUENCY
FREQUENCY: INTERMITTENT
FREQUENCY: INTERMITTENT

## 2018-11-14 NOTE — DISCHARGE INSTR - DIET
 Good nutrition is important when healing from an illness, injury, or surgery. Follow any nutrition recommendations given to you during your hospital stay.  If you were given an oral nutrition supplement while in the hospital, continue to take this supplement at home. You can take it with meals, in-between meals, and/or before bedtime. These supplements can be purchased at most local grocery stores, pharmacies, and chain super-stores.  If you have any questions about your diet or nutrition, call the hospital and ask for the dietitian. You are being discharged on a 5 carb (75 grams)/ meal control diet. You were receiving a wound healing oral supplement while in the hospital. We recommend you to continue taking these at home. Monitor your blood sugars frequently.

## 2018-11-14 NOTE — PROGRESS NOTES
with AAD and SBA   Picking up object off the floor NT NT  Will  an object with SBA Will  an object with Supervision   BLE ROM [facility]      BLE Strength Grossly 4+/5 in LLE   R residual limb is at least 3/5 Grossly 4+/5 in LLE   R residual limb is at least 3/5      Balance  Sitting EOM: SBA  Dynamic Standing: Mila Sitting EOM: Independent  Dynamic Standing: SBA      Date Family Teach Completed No family present at initial evaluation Daughter in law present for family teach 11/14/18 (hands-on)       Is additional Family Teaching Needed? Y or N Yes No      Hindering Progress Pain Pain      PT recommended ELOS 2 weeks Discharged 11/14/18      Team's Discharge Plan        Therapist at Team Meeting          Therapeutic Exercise:   AM:   Ambulation 2x50 feet with wide WW with SBA    Additional Comments: The pt's daughter-in-law was present for the afternoon session, she was able to demonstrate good guarding technique during stair negotiation with a tub seat. The pt was able to ambulate short distances with a wide WW with no AD. Discussed the pt's DME needs with the pt, his daughter-in-law, and social work. Also discussed the pt's needs and technique to enter home with the pt's daughter-in-law as well as the pt at length. Set up the pt's tub seat at appropriate height, both the pt and his daughter-in-law stated they understood and all their questions were answered. Patient/family education  Pt/family educated on home entry, DME needs, guarding technique with dynamic standing activities, stair negotiation sequencing. Patient/family response to education:   Pt/family verbalized understanding Pt/family demonstrated skill Pt/family requires further education in this area   Yes Yes Reinforce      AM  Time in: 1000  Time out: 1045    Pt is made good progress toward established Physical Therapy goals. Continue with physical therapy current plan of care. Aureliano Bazan, MAREN Varma.  Marcin Velarde Number: [id number]

## 2018-11-14 NOTE — PLAN OF CARE
Problem: Falls - Risk of:  Goal: Will remain free from falls  Will remain free from falls   Outcome: Met This Shift      Problem: Pain:  Goal: Pain level will decrease  Pain level will decrease   Outcome: Ongoing      Problem: SAFETY  Goal: LTG - Patient will demonstrate safety requirements appropriate to situation/environment  Outcome: Ongoing    Goal: LTG - patient will utilize safety techniques  Outcome: Ongoing    Goal: STG - patient locks brakes on wheelchair  Outcome: Met This Shift    Goal: STG - Patient uses call light consistently to request assistance with transfers  Outcome: Not Met This Shift  Pt went to the restroom without calling for assistance, transferring himself onto the commode and back into the wheelchair without calling for assistance. Pt also transferred himself into bed without calling for assistance.      Problem: Infection - Surgical Site:  Goal: Will show no infection signs and symptoms  Will show no infection signs and symptoms   Outcome: Met This Shift
Problem: Falls - Risk of:  Goal: Will remain free from falls  Will remain free from falls   Outcome: Met This Shift      Problem: Risk for Impaired Skin Integrity  Goal: Tissue integrity - skin and mucous membranes  Structural intactness and normal physiological function of skin and  mucous membranes.    Outcome: Met This Shift      Problem: Pain:  Goal: Control of chronic pain  Control of chronic pain   Outcome: Met This Shift      Problem: IP BOWEL ELIMINATION  Goal: LTG - patient will have regular and routine bowel evacuation  Outcome: Met This Shift      Problem: SAFETY  Goal: STG - Patient uses call light consistently to request assistance with transfers  Outcome: Met This Shift      Problem: SKIN INTEGRITY  Goal: STG - patient will maintain good skin integrity  Outcome: Met This Shift      Problem: PAIN  Goal: STG - Patient will verbalize an acceptable level of pain  Outcome: Met This Shift      Problem: Infection - Surgical Site:  Goal: Will show no infection signs and symptoms  Will show no infection signs and symptoms   Outcome: Met This Shift      Problem: Mobility - Impaired:  Goal: Mobility will improve  Mobility will improve   Outcome: Met This Shift      Problem: Serum Glucose Level - Abnormal:  Goal: Ability to maintain appropriate glucose levels will improve  Ability to maintain appropriate glucose levels will improve   Outcome: Met This Shift      Problem: Sensory Perception - Impaired:  Goal: Ability to maintain a stable neurologic state will improve  Ability to maintain a stable neurologic state will improve   Outcome: Met This Shift
Problem: Falls - Risk of:  Goal: Will remain free from falls  Will remain free from falls   Outcome: Met This Shift      Problem: Risk for Impaired Skin Integrity  Goal: Tissue integrity - skin and mucous membranes  Structural intactness and normal physiological function of skin and  mucous membranes.    Outcome: Met This Shift      Problem: Pain:  Goal: Pain level will decrease  Pain level will decrease   Outcome: Met This Shift      Problem: IP BLADDER/VOIDING  Goal: LTG - patient will complete bladder elimination  Outcome: Completed Date Met: 11/11/18    Goal: LTG - Patient will utilize adaptive techniques/equipment to complete bladder elimination  Outcome: Completed Date Met: 11/11/18    Goal: LTG - patient will achieve acceptable level of continence  Outcome: Completed Date Met: 11/11/18    Goal: STG - patient participates in bladder program by adhering to implemented toileting schedule  Outcome: Completed Date Met: 11/11/18      Problem: IP BOWEL ELIMINATION  Goal: LTG - patient will have regular and routine bowel evacuation  Outcome: Met This Shift      Problem: NUTRITION  Goal: Patient maintains adequate hydration  Outcome: Met This Shift      Problem: SAFETY  Goal: STG - Patient uses call light consistently to request assistance with transfers  Outcome: Met This Shift      Problem: SKIN INTEGRITY  Goal: STG - patient will maintain good skin integrity  Outcome: Met This Shift      Problem: Infection - Surgical Site:  Goal: Will show no infection signs and symptoms  Will show no infection signs and symptoms   Outcome: Met This Shift      Problem: Mobility - Impaired:  Goal: Mobility will improve  Mobility will improve   Outcome: Met This Shift      Problem: Serum Glucose Level - Abnormal:  Goal: Ability to maintain appropriate glucose levels will improve  Ability to maintain appropriate glucose levels will improve  Outcome: Met This Shift      Problem: Sensory Perception - Impaired:  Goal: Ability to maintain
Problem: Falls - Risk of:  Goal: Will remain free from falls  Will remain free from falls   Outcome: Met This Shift      Problem: Risk for Impaired Skin Integrity  Goal: Tissue integrity - skin and mucous membranes  Structural intactness and normal physiological function of skin and  mucous membranes.    Outcome: Met This Shift      Problem: Pain:  Goal: Pain level will decrease  Pain level will decrease   Outcome: Met This Shift      Problem: IP BLADDER/VOIDING  Goal: STG - Patient will state signs and symptoms of UTI  Outcome: Completed Date Met: 11/12/18      Problem: IP BOWEL ELIMINATION  Goal: LTG - patient will have regular and routine bowel evacuation  Outcome: Met This Shift      Problem: NUTRITION  Goal: Patient maintains adequate hydration  Outcome: Met This Shift      Problem: SAFETY  Goal: STG - Patient uses call light consistently to request assistance with transfers  Outcome: Met This Shift      Problem: SKIN INTEGRITY  Goal: STG - patient will maintain good skin integrity  Outcome: Met This Shift      Problem: Infection - Surgical Site:  Goal: Will show no infection signs and symptoms  Will show no infection signs and symptoms   Outcome: Met This Shift      Problem: Mobility - Impaired:  Goal: Mobility will improve  Mobility will improve   Outcome: Met This Shift      Problem: Serum Glucose Level - Abnormal:  Goal: Ability to maintain appropriate glucose levels will improve  Ability to maintain appropriate glucose levels will improve   Outcome: Met This Shift      Problem: Sensory Perception - Impaired:  Goal: Ability to maintain a stable neurologic state will improve  Ability to maintain a stable neurologic state will improve   Outcome: Met This Shift
Problem: Falls - Risk of:  Goal: Will remain free from falls  Will remain free from falls   Outcome: Met This Shift      Problem: Risk for Impaired Skin Integrity  Goal: Tissue integrity - skin and mucous membranes  Structural intactness and normal physiological function of skin and  mucous membranes.    Outcome: Met This Shift      Problem: Pain:  Goal: Pain level will decrease  Pain level will decrease   Outcome: Met This Shift      Problem: IP BOWEL ELIMINATION  Goal: LTG - patient will have regular and routine bowel evacuation  Outcome: Met This Shift      Problem: SAFETY  Goal: STG - Patient uses call light consistently to request assistance with transfers  Outcome: Met This Shift      Problem: SKIN INTEGRITY  Goal: STG - patient will maintain good skin integrity  Outcome: Met This Shift      Problem: PAIN  Goal: STG - Patient will verbalize an acceptable level of pain  Outcome: Met This Shift      Problem: Infection - Surgical Site:  Goal: Will show no infection signs and symptoms  Will show no infection signs and symptoms   Outcome: Met This Shift      Problem: Mobility - Impaired:  Goal: Mobility will improve  Mobility will improve   Outcome: Met This Shift      Problem: Serum Glucose Level - Abnormal:  Goal: Ability to maintain appropriate glucose levels will improve  Ability to maintain appropriate glucose levels will improve   Outcome: Met This Shift      Problem: Sensory Perception - Impaired:  Goal: Ability to maintain a stable neurologic state will improve  Ability to maintain a stable neurologic state will improve   Outcome: Met This Shift
Problem: Falls - Risk of:  Goal: Will remain free from falls  Will remain free from falls   Outcome: Met This Shift      Problem: Risk for Impaired Skin Integrity  Goal: Tissue integrity - skin and mucous membranes  Structural intactness and normal physiological function of skin and  mucous membranes.    Outcome: Met This Shift      Problem: Pain:  Goal: Pain level will decrease  Pain level will decrease   Outcome: Met This Shift      Problem: NUTRITION  Goal: Patient maintains adequate hydration  Outcome: Met This Shift      Problem: SAFETY  Goal: STG - Patient uses call light consistently to request assistance with transfers  Outcome: Met This Shift      Problem: SKIN INTEGRITY  Goal: LTG - Patient will be free from infection  Outcome: Met This Shift      Problem: PAIN  Goal: STG - Patient will reduce or eliminate use of analgesics  Outcome: Met This Shift      Problem: Infection - Surgical Site:  Goal: Will show no infection signs and symptoms  Will show no infection signs and symptoms   Outcome: Met This Shift      Problem: Mobility - Impaired:  Goal: Mobility will improve  Mobility will improve   Outcome: Met This Shift
Problem: Falls - Risk of:  Goal: Will remain free from falls  Will remain free from falls   Outcome: Met This Shift    Goal: Absence of physical injury  Absence of physical injury   Outcome: Met This Shift      Problem: Risk for Impaired Skin Integrity  Goal: Tissue integrity - skin and mucous membranes  Structural intactness and normal physiological function of skin and  mucous membranes.    Outcome: Met This Shift      Problem: Pain:  Goal: Pain level will decrease  Pain level will decrease   Outcome: Met This Shift    Goal: Control of acute pain  Control of acute pain   Outcome: Met This Shift    Goal: Control of chronic pain  Control of chronic pain   Outcome: Met This Shift      Problem: IP BLADDER/VOIDING  Goal: LTG - patient will complete bladder elimination  Outcome: Met This Shift    Goal: LTG - Patient will utilize adaptive techniques/equipment to complete bladder elimination  Outcome: Met This Shift    Goal: LTG - patient will achieve acceptable level of continence  Outcome: Met This Shift    Goal: STG - Patient demonstrates ability to take care of indwelling catheter  Outcome: Met This Shift    Goal: STG - Patient will state signs and symptoms of UTI  Outcome: Met This Shift    Goal: STG - Patient verbalizes understanding of catheter care indwelling/intermittent  Outcome: Met This Shift    Goal: STG - patient participates in bladder program by adhering to implemented toileting schedule  Outcome: Met This Shift      Problem: IP BOWEL ELIMINATION  Goal: LTG - patient will utilize adaptive techniques/equipment to complete bowel elimination  Outcome: Met This Shift    Goal: LTG - patient will have regular and routine bowel evacuation  Outcome: Met This Shift    Goal: STG - patient will be accident free  Outcome: Met This Shift    Goal: STG - Patient participates in bowel management program  Outcome: Met This Shift    Goal: STG - patient maintains skin integrity  Outcome: Met This Shift    Goal: STG - Patient
Patient verbalizes knowledge about relationship between diet, fluid intake, activity and medication on constipation  Outcome: Met This Shift      Problem: IP BREATHING  Goal: STG - respiratory rate and effort will be within normal limits for the patient  Outcome: Met This Shift    Goal: STG - patient will utilize incentive spirometer  Outcome: Met This Shift      Problem: NUTRITION  Goal: Patient maintains adequate hydration  Outcome: Met This Shift    Goal: Patient maintains weight  Outcome: Met This Shift      Problem: SAFETY  Goal: LTG - patient will adhere to hip precautions during ADL's and transfers  Outcome: Met This Shift    Goal: LTG - Patient will demonstrate safety requirements appropriate to situation/environment  Outcome: Met This Shift    Goal: LTG - patient will utilize safety techniques  Outcome: Met This Shift    Goal: STG - patient locks brakes on wheelchair  Outcome: Met This Shift    Goal: STG - Patient uses call light consistently to request assistance with transfers  Outcome: Met This Shift    Goal: STG - patient uses gait belt during all transfers  Outcome: Met This Shift      Problem: SKIN INTEGRITY  Goal: LTG - Patient will be free from infection  Outcome: Met This Shift    Goal: LTG - patient will maintain/improve skin integrity through proper skin care techniques  Outcome: Met This Shift    Goal: LTG - Patient will demonstrate appropriate pressure relief techniques  Outcome: Met This Shift    Goal: LTG - patient will demonstrate appropriate skin care techniques  Outcome: Met This Shift    Goal: LTG - Patient will be free from infection  Outcome: Met This Shift    Goal: STG - Patient demonstrates skin care/treatment/dressing change  Outcome: Met This Shift    Goal: STG - patient will maintain good skin integrity  Outcome: Met This Shift    Goal: STG - Patient exhibits signs of wound healing.   Outcome: Met This Shift    Goal: STG - patient demonstrates pressure reduction techniques  Outcome:

## 2018-11-14 NOTE — PROGRESS NOTES
CLINICAL PHARMACY NOTE: MEDS TO [address] Select Patient?: No  Total # of Prescriptions Filled: 7   The following medications were delivered to the patient:  · CLONIDINE 0.1 MG   · FERROUS SULFATE 325 MG   · LYRICA 200 MG   · NORCO 5/325 MG   · DULOXETINE 60 MG  · INSULIN SYRINGES   · HUMALOG   Total # of Interventions Completed: 3  Time Spent (min): 30    Additional Documentation:

## 2018-12-17 ENCOUNTER — OFFICE VISIT (OUTPATIENT)
Dept: VASCULAR SURGERY | Age: 63
End: 2018-12-17
Payer: MEDICARE

## 2018-12-17 VITALS
HEART RATE: 63 BPM | BODY MASS INDEX: 38.37 KG/M2 | HEIGHT: 74 IN | WEIGHT: 299 LBS | SYSTOLIC BLOOD PRESSURE: 138 MMHG | DIASTOLIC BLOOD PRESSURE: 75 MMHG

## 2018-12-17 DIAGNOSIS — Z89.611 S/P AKA (ABOVE KNEE AMPUTATION), RIGHT (HCC): Primary | ICD-10-CM

## 2018-12-17 PROBLEM — Z89.511 S/P BKA (BELOW KNEE AMPUTATION) UNILATERAL, RIGHT (HCC): Status: ACTIVE | Noted: 2018-12-17

## 2018-12-17 PROCEDURE — 99024 POSTOP FOLLOW-UP VISIT: CPT | Performed by: SURGERY

## 2018-12-17 PROCEDURE — L8460 SHRINKER ABOVE KNEE: HCPCS | Performed by: SURGERY

## 2018-12-18 ENCOUNTER — TELEPHONE (OUTPATIENT)
Dept: VASCULAR SURGERY | Age: 63
End: 2018-12-18

## 2019-01-02 ENCOUNTER — HOSPITAL ENCOUNTER (OUTPATIENT)
Dept: PHYSICAL THERAPY | Age: 64
Setting detail: THERAPIES SERIES
Discharge: HOME | End: 2019-01-02
Payer: MEDICARE

## 2019-01-02 PROCEDURE — G8978 MOBILITY CURRENT STATUS: HCPCS | Performed by: PHYSICAL THERAPIST

## 2019-01-02 PROCEDURE — G8979 MOBILITY GOAL STATUS: HCPCS | Performed by: PHYSICAL THERAPIST

## 2019-01-02 ASSESSMENT — PAIN DESCRIPTION - DESCRIPTORS: DESCRIPTORS: ACHING;CONSTANT

## 2019-01-02 ASSESSMENT — PAIN DESCRIPTION - PAIN TYPE: TYPE: PHANTOM PAIN

## 2019-01-02 ASSESSMENT — PAIN DESCRIPTION - LOCATION: LOCATION: LEG

## 2019-01-02 ASSESSMENT — PAIN SCALES - GENERAL: PAINLEVEL_OUTOF10: 7

## 2019-01-02 ASSESSMENT — PAIN DESCRIPTION - ORIENTATION: ORIENTATION: RIGHT

## 2019-01-09 ENCOUNTER — HOSPITAL ENCOUNTER (OUTPATIENT)
Dept: PHYSICAL THERAPY | Age: 64
Setting detail: THERAPIES SERIES
Discharge: HOME | End: 2019-01-09
Payer: MEDICARE

## 2019-01-09 PROCEDURE — 97110 THERAPEUTIC EXERCISES: CPT | Performed by: PHYSICAL THERAPIST

## 2019-01-10 ENCOUNTER — APPOINTMENT (OUTPATIENT)
Dept: PHYSICAL THERAPY | Age: 64
End: 2019-01-10
Payer: MEDICARE

## 2019-01-16 ENCOUNTER — HOSPITAL ENCOUNTER (OUTPATIENT)
Dept: PHYSICAL THERAPY | Age: 64
Setting detail: THERAPIES SERIES
Discharge: HOME | End: 2019-01-16
Payer: MEDICARE

## 2019-01-23 ENCOUNTER — HOSPITAL ENCOUNTER (OUTPATIENT)
Dept: PHYSICAL THERAPY | Age: 64
Setting detail: THERAPIES SERIES
Discharge: HOME | End: 2019-01-23
Payer: MEDICARE

## 2019-01-23 PROCEDURE — 97110 THERAPEUTIC EXERCISES: CPT | Performed by: PHYSICAL THERAPIST

## 2019-01-28 ENCOUNTER — OFFICE VISIT (OUTPATIENT)
Dept: VASCULAR SURGERY | Age: 64
End: 2019-01-28

## 2019-01-28 DIAGNOSIS — Z89.611 S/P AKA (ABOVE KNEE AMPUTATION), RIGHT (HCC): Primary | ICD-10-CM

## 2019-01-28 PROCEDURE — 99024 POSTOP FOLLOW-UP VISIT: CPT | Performed by: NURSE PRACTITIONER

## 2019-01-28 PROCEDURE — L8499 UNLISTED MISC PROSTHETIC SER: HCPCS | Performed by: SURGERY

## 2019-02-06 ENCOUNTER — HOSPITAL ENCOUNTER (OUTPATIENT)
Dept: PHYSICAL THERAPY | Age: 64
Setting detail: THERAPIES SERIES
Discharge: HOME | End: 2019-02-06
Payer: MEDICARE

## 2019-02-06 PROCEDURE — 97530 THERAPEUTIC ACTIVITIES: CPT | Performed by: PHYSICAL THERAPIST

## 2019-02-06 PROCEDURE — 97110 THERAPEUTIC EXERCISES: CPT | Performed by: PHYSICAL THERAPIST

## 2019-02-20 ENCOUNTER — HOSPITAL ENCOUNTER (OUTPATIENT)
Dept: PHYSICAL THERAPY | Age: 64
Setting detail: THERAPIES SERIES
Discharge: HOME | End: 2019-02-20
Payer: MEDICARE

## 2019-02-27 ENCOUNTER — HOSPITAL ENCOUNTER (OUTPATIENT)
Dept: PHYSICAL THERAPY | Age: 64
Setting detail: THERAPIES SERIES
Discharge: HOME | End: 2019-02-27
Payer: MEDICARE

## 2019-03-06 ENCOUNTER — HOSPITAL ENCOUNTER (OUTPATIENT)
Dept: PHYSICAL THERAPY | Age: 64
Setting detail: THERAPIES SERIES
Discharge: HOME | End: 2019-03-06
Payer: MEDICARE

## 2019-03-06 PROCEDURE — 97110 THERAPEUTIC EXERCISES: CPT | Performed by: PHYSICAL THERAPIST

## 2019-03-13 ENCOUNTER — HOSPITAL ENCOUNTER (OUTPATIENT)
Dept: PHYSICAL THERAPY | Age: 64
Setting detail: THERAPIES SERIES
Discharge: HOME | End: 2019-03-13
Payer: MEDICARE

## 2019-03-13 PROCEDURE — 97110 THERAPEUTIC EXERCISES: CPT | Performed by: PHYSICAL THERAPIST

## 2019-04-29 ENCOUNTER — HOSPITAL ENCOUNTER (OUTPATIENT)
Dept: PHYSICAL THERAPY | Age: 64
Setting detail: THERAPIES SERIES
Discharge: HOME | End: 2019-04-29
Payer: MEDICARE

## 2019-04-29 NOTE — PROGRESS NOTES
Date:  4/29/2019          Dear Marlon Palafox: This is to inform you that, as per [facility] Physical Therapy department policy, your patient Niraj Mason is as of todays date being discharged from Physical Therapy secondary to the following reasons:    1. If a Physical Therapy outpatient misses three consecutive scheduled appointments   without any prior notification, he or she will be discharged from physical therapy services. A new prescription will be necessary to resume physical therapy. 2. If a client is absent for 50% of scheduled visits during a one-month period, he or she will be discharged from physical therapy services. A new prescription will be necessary to resume physical therapy. If you have any questions, please feel free to call at [phone]      Thank you          Marah Rivera    [phone]    The information contained in this Fax the designated recipients intend message only for the personal and confidential use named above. This information is to be handled as privileged and confidential.  If the reader of this message is not the intended recipient, you are hereby notified that you have received this document in error and that any review, dissemination, distribution or copying of this message is strictly prohibited. If you receive this communication in error, please notify us immediately at the above number and return the original to us by mail.   Thank you      [address] [place], [place] [id number] [phone]

## 2019-05-30 RX ORDER — BLOOD SUGAR DIAGNOSTIC
1 STRIP MISCELLANEOUS
Qty: 500 EACH | Refills: 3 | Status: SHIPPED
Start: 2019-05-30 | End: 2020-08-10

## 2019-06-14 ENCOUNTER — OFFICE VISIT (OUTPATIENT)
Dept: PRIMARY CARE CLINIC | Age: 64
End: 2019-06-14
Payer: COMMERCIAL

## 2019-06-14 VITALS
TEMPERATURE: 98.2 F | DIASTOLIC BLOOD PRESSURE: 70 MMHG | BODY MASS INDEX: 35.29 KG/M2 | SYSTOLIC BLOOD PRESSURE: 126 MMHG | WEIGHT: 275 LBS | OXYGEN SATURATION: 96 % | HEIGHT: 74 IN | HEART RATE: 60 BPM

## 2019-06-14 DIAGNOSIS — I73.9 PVD (PERIPHERAL VASCULAR DISEASE) (HCC): ICD-10-CM

## 2019-06-14 DIAGNOSIS — E78.2 MIXED HYPERLIPIDEMIA: ICD-10-CM

## 2019-06-14 DIAGNOSIS — I10 HYPERTENSION, UNSPECIFIED TYPE: ICD-10-CM

## 2019-06-14 DIAGNOSIS — Z79.4 CONTROLLED TYPE 2 DIABETES MELLITUS WITH OTHER SPECIFIED COMPLICATION, WITH LONG-TERM CURRENT USE OF INSULIN (HCC): ICD-10-CM

## 2019-06-14 DIAGNOSIS — Z89.611 S/P AKA (ABOVE KNEE AMPUTATION), RIGHT (HCC): ICD-10-CM

## 2019-06-14 DIAGNOSIS — E11.69 CONTROLLED TYPE 2 DIABETES MELLITUS WITH OTHER SPECIFIED COMPLICATION, WITH LONG-TERM CURRENT USE OF INSULIN (HCC): ICD-10-CM

## 2019-06-14 DIAGNOSIS — N18.30 STAGE 3 CHRONIC KIDNEY DISEASE (HCC): ICD-10-CM

## 2019-06-14 DIAGNOSIS — G89.4 CHRONIC PAIN SYNDROME: Primary | ICD-10-CM

## 2019-06-14 PROBLEM — L03.031 CELLULITIS AND ABSCESS OF TOE OF RIGHT FOOT: Status: RESOLVED | Noted: 2018-07-31 | Resolved: 2019-06-14

## 2019-06-14 PROBLEM — E66.01 MORBID OBESITY WITH BMI OF 40.0-44.9, ADULT (HCC): Status: RESOLVED | Noted: 2018-07-31 | Resolved: 2019-06-14

## 2019-06-14 PROBLEM — R09.89 ABSENT FOOT PULSE: Status: RESOLVED | Noted: 2018-07-31 | Resolved: 2019-06-14

## 2019-06-14 PROBLEM — D72.829 LEUKOCYTOSIS: Status: RESOLVED | Noted: 2018-07-31 | Resolved: 2019-06-14

## 2019-06-14 PROBLEM — E87.20 LACTIC ACIDOSIS: Status: RESOLVED | Noted: 2018-05-21 | Resolved: 2019-06-14

## 2019-06-14 PROBLEM — Z79.01 ANTICOAGULATED: Status: RESOLVED | Noted: 2018-07-31 | Resolved: 2019-06-14

## 2019-06-14 PROBLEM — L97.919 ULCER OF RIGHT LOWER EXTREMITY (HCC): Status: RESOLVED | Noted: 2018-07-31 | Resolved: 2019-06-14

## 2019-06-14 PROBLEM — E87.5 HYPERKALEMIA: Status: RESOLVED | Noted: 2018-05-21 | Resolved: 2019-06-14

## 2019-06-14 PROBLEM — B95.61 STAPHYLOCOCCUS AUREUS BACTEREMIA WITHOUT SEPSIS: Status: RESOLVED | Noted: 2018-10-27 | Resolved: 2019-06-14

## 2019-06-14 PROBLEM — E87.1 HYPONATREMIA: Status: RESOLVED | Noted: 2018-05-21 | Resolved: 2019-06-14

## 2019-06-14 PROBLEM — D50.9 MICROCYTIC ANEMIA: Status: RESOLVED | Noted: 2018-07-31 | Resolved: 2019-06-14

## 2019-06-14 PROBLEM — L97.412 DIABETIC ULCER OF RIGHT MIDFOOT ASSOCIATED WITH TYPE 2 DIABETES MELLITUS, WITH FAT LAYER EXPOSED (HCC): Chronic | Status: RESOLVED | Noted: 2018-05-22 | Resolved: 2019-06-14

## 2019-06-14 PROBLEM — Z89.511 S/P BKA (BELOW KNEE AMPUTATION) UNILATERAL, RIGHT (HCC): Status: RESOLVED | Noted: 2018-12-17 | Resolved: 2019-06-14

## 2019-06-14 PROBLEM — R78.81 STAPHYLOCOCCUS AUREUS BACTEREMIA WITHOUT SEPSIS: Status: RESOLVED | Noted: 2018-10-27 | Resolved: 2019-06-14

## 2019-06-14 PROBLEM — E11.65 UNCONTROLLED DIABETES MELLITUS WITH HYPERGLYCEMIA (HCC): Status: RESOLVED | Noted: 2018-07-31 | Resolved: 2019-06-14

## 2019-06-14 PROBLEM — E11.621 DIABETIC ULCER OF RIGHT MIDFOOT ASSOCIATED WITH TYPE 2 DIABETES MELLITUS, WITH FAT LAYER EXPOSED (HCC): Chronic | Status: RESOLVED | Noted: 2018-05-22 | Resolved: 2019-06-14

## 2019-06-14 PROBLEM — L02.611 CELLULITIS AND ABSCESS OF TOE OF RIGHT FOOT: Status: RESOLVED | Noted: 2018-07-31 | Resolved: 2019-06-14

## 2019-06-14 PROBLEM — L97.512 CHRONIC ULCER OF RIGHT FOOT WITH FAT LAYER EXPOSED (HCC): Status: RESOLVED | Noted: 2018-10-24 | Resolved: 2019-06-14

## 2019-06-14 LAB — HBA1C MFR BLD: 7.7 %

## 2019-06-14 PROCEDURE — 99214 OFFICE O/P EST MOD 30 MIN: CPT | Performed by: FAMILY MEDICINE

## 2019-06-14 PROCEDURE — 83036 HEMOGLOBIN GLYCOSYLATED A1C: CPT | Performed by: FAMILY MEDICINE

## 2019-06-14 RX ORDER — LANCETS 33 GAUGE
EACH MISCELLANEOUS
COMMUNITY
End: 2019-06-14 | Stop reason: SDUPTHER

## 2019-06-14 RX ORDER — AMLODIPINE BESYLATE 10 MG/1
10 TABLET ORAL EVERY MORNING
Qty: 30 TABLET | Refills: 3 | Status: SHIPPED | OUTPATIENT
Start: 2019-06-14 | End: 2019-10-10 | Stop reason: SDUPTHER

## 2019-06-14 RX ORDER — OXYCODONE HCL 10 MG/1
10 TABLET, FILM COATED, EXTENDED RELEASE ORAL EVERY 12 HOURS
Qty: 60 EACH | Refills: 0 | Status: SHIPPED | OUTPATIENT
Start: 2019-08-13 | End: 2019-09-12

## 2019-06-14 RX ORDER — OXYCODONE HCL 10 MG/1
10 TABLET, FILM COATED, EXTENDED RELEASE ORAL 2 TIMES DAILY
Qty: 60 TABLET | Refills: 0 | Status: SHIPPED | OUTPATIENT
Start: 2019-06-14 | End: 2019-07-11 | Stop reason: SDUPTHER

## 2019-06-14 RX ORDER — OXYCODONE HCL 10 MG/1
10 TABLET, FILM COATED, EXTENDED RELEASE ORAL 2 TIMES DAILY
Qty: 60 TABLET | Refills: 0 | Status: SHIPPED | OUTPATIENT
Start: 2019-06-14 | End: 2019-07-14

## 2019-06-14 RX ORDER — METOPROLOL TARTRATE 100 MG/1
100 TABLET ORAL 2 TIMES DAILY
Qty: 60 TABLET | Refills: 1 | Status: SHIPPED | OUTPATIENT
Start: 2019-06-14 | End: 2019-11-11 | Stop reason: SDUPTHER

## 2019-06-14 RX ORDER — LANCETS 33 GAUGE
1 EACH MISCELLANEOUS DAILY
Qty: 100 EACH | Refills: 1 | Status: SHIPPED
Start: 2019-06-14 | End: 2020-10-13

## 2019-06-14 RX ORDER — HYDROCODONE BITARTRATE AND ACETAMINOPHEN 5; 325 MG/1; MG/1
1 TABLET ORAL EVERY 6 HOURS PRN
Qty: 120 TABLET | Refills: 0 | Status: SHIPPED | OUTPATIENT
Start: 2019-06-14 | End: 2019-07-11 | Stop reason: SDUPTHER

## 2019-06-14 RX ORDER — DULOXETIN HYDROCHLORIDE 60 MG/1
120 CAPSULE, DELAYED RELEASE ORAL EVERY MORNING
Qty: 30 CAPSULE | Refills: 3 | Status: SHIPPED | OUTPATIENT
Start: 2019-06-14 | End: 2019-10-11 | Stop reason: SDUPTHER

## 2019-06-14 RX ORDER — CLONIDINE HYDROCHLORIDE 0.1 MG/1
0.1 TABLET ORAL 3 TIMES DAILY
Qty: 60 TABLET | Refills: 3 | Status: SHIPPED | OUTPATIENT
Start: 2019-06-14 | End: 2019-10-10 | Stop reason: SDUPTHER

## 2019-06-14 ASSESSMENT — ENCOUNTER SYMPTOMS
ABDOMINAL PAIN: 0
CONSTIPATION: 0
NAUSEA: 0
VOMITING: 0
BACK PAIN: 0
PHOTOPHOBIA: 0
BLOOD IN STOOL: 0
COUGH: 0
DIARRHEA: 0
SHORTNESS OF BREATH: 0
SORE THROAT: 0

## 2019-06-14 NOTE — PROGRESS NOTES
2019     Larissa Coe (:  1955) is a 66 y.o. male, here for evaluation of the following medical concerns:    HPI  Treatment Adherence:   Medication compliance:  compliant most of the time  Diet compliance:  compliant most of the time  Weight trend: stable  Current exercise: no regular exercise  Barriers: impairment:  physical: Currently waiting for above-the-knee amputation prosthetic. Diabetes Mellitus Type 2: Current symptoms/problems include none. Home blood sugar records: Variable from 150s to 250s  Any episodes of hypoglycemia? no  Eye exam current (within one year): yes  Tobacco history: He  reports that he has been smoking. He has a 3.50 pack-year smoking history. He has never used smokeless tobacco.   Daily Aspirin? No:     Hypertension:  Home blood pressure monitoring: No.  He is not adherent to a low sodium diet. Patient complains of fatigue. Antihypertensive medication side effects: no medication side effects noted. Use of agents associated with hypertension: none. Hyperlipidemia:  No new myalgias or GI upset on atorvastatin (Lipitor). Lab Results   Component Value Date    LABA1C 7.7 2019    LABA1C 10.4 (H) 10/25/2018    LABA1C 13.3 (H) 2018     Lab Results   Component Value Date    LABMICR 71.6 (H) 2018    CREATININE 1.5 (H) 2018     Lab Results   Component Value Date    ALT 20 10/28/2018    AST 14 10/28/2018     Lab Results   Component Value Date    CHOL 108 2018    TRIG 81 2018    HDL 26 2018    LDLCALC 66 2018          Review of Systems   Constitutional: Negative for chills and fever. HENT: Negative for congestion, hearing loss, nosebleeds and sore throat. Eyes: Negative for photophobia. Respiratory: Negative for cough and shortness of breath. Cardiovascular: Negative for chest pain, palpitations and leg swelling.    Gastrointestinal: Negative for abdominal pain, blood in stool, constipation, diarrhea, nausea and vomiting. Endocrine: Negative for polydipsia. Genitourinary: Negative for dysuria, frequency, hematuria and urgency. Musculoskeletal: Negative for back pain and myalgias. Right Sided above-the-knee amputation   Skin: Negative. Neurological: Negative for dizziness, tremors, weakness and headaches. Hematological: Does not bruise/bleed easily. Psychiatric/Behavioral: Negative for hallucinations and suicidal ideas. All other systems reviewed and are negative. Prior to Visit Medications    Medication Sig Taking? Authorizing Provider   DULoxetine (CYMBALTA) 60 MG extended release capsule Take 2 capsules by mouth every morning Yes Juanito Kline, DO   cloNIDine (CATAPRES) 0.1 MG tablet Take 1 tablet by mouth 3 times daily Yes Juanito Kline DO   amLODIPine (NORVASC) 10 MG tablet Take 1 tablet by mouth every morning Hold if sbp <140 Yes Juanito Kline, DO   metoprolol (LOPRESSOR) 100 MG tablet Take 1 tablet by mouth 2 times daily Yes Juanito Kline, DO   LANCETS MICRO THIN 28E MISC 1 applicator by Does not apply route daily Yes Juanito Kline, DO   blood glucose test strips (ASCENSIA AUTODISC VI;ONE TOUCH ULTRA TEST VI) strip 1 each by In Vitro route daily As needed. Yes Juanito Kline, DO   oxyCODONE (OXYCONTIN) 10 MG extended release tablet Take 1 tablet by mouth every 12 hours for 30 days. Yes Juanito Kline, DO   oxyCODONE (OXYCONTIN) 10 MG extended release tablet Take 1 tablet by mouth 2 times daily for 30 days. Intended supply: 30 days Yes Juanito Kline, DO   oxyCODONE (OXYCONTIN) 10 MG extended release tablet Take 1 tablet by mouth 2 times daily for 30 days. Intended supply: 30 days Yes Juanito Kline, DO   HYDROcodone-acetaminophen (NORCO) 5-325 MG per tablet Take 1 tablet by mouth every 6 hours as needed for Pain for up to 30 days.  Intended supply: 30 days  For breakthrough Yes Juanito Kline, DO   Insulin Syringe-Needle U-100 30G X 5/16\" 0.3 ML MISC 1 box by Does not apply route 5 times daily  Juanito Kline DO   LYRICA 300 MG capsule Take 1 capsule by mouth 2 times daily for 30 days. Burgess Benito MD   insulin glargine (LANTUS) 100 UNIT/ML injection vial Inject 15 Units into the skin 2 times daily  Eddie Gomez MD   HYDROcodone-acetaminophen (NORCO) 5-325 MG per tablet Take 1 tablet by mouth every 4 hours as needed for Pain for up to 30 days. Ara Suazo Date: 3/10/19  Gregorio Griffith MD   ferrous sulfate 325 (65 Fe) MG tablet Take 1 tablet by mouth 2 times daily (with meals)  Eddie Gomez MD   insulin lispro (HUMALOG) 100 UNIT/ML injection vial Inject 5 Units into the skin 3 times daily (with meals)  Eddie Gomez MD   lidocaine (LIDODERM) 5 % Place 1 patch onto the skin daily 12 hours on, 12 hours off. Gregorio Griffith MD   aspirin 81 MG chewable tablet Take 1 tablet by mouth daily  Devang Green MD   lisinopril (PRINIVIL;ZESTRIL) 10 MG tablet Take 1 tablet by mouth daily Hold if sbp<140  Devang Green MD   insulin lispro (HUMALOG) 100 UNIT/ML injection vial Inject 0-18 Units into the skin 3 times daily (with meals)  Mary Landers MD   cilostazol (PLETAL) 100 MG tablet Take 100 mg by mouth 2 times daily  Historical Provider, MD   tiZANidine (ZANAFLEX) 4 MG tablet Take 4 mg by mouth every 8 hours as needed   Historical Provider, MD   pregabalin (LYRICA) 100 MG capsule Take 200 mg by mouth 3 times daily. Gabrielle Marks   Historical Provider, MD   spironolactone (ALDACTONE) 50 MG tablet Take 50 mg by mouth every morning   Historical Provider, MD   atorvastatin (LIPITOR) 10 MG tablet Take 10 mg by mouth daily   Historical Provider, MD   esomeprazole (NEXIUM) 40 MG delayed release capsule Take 40 mg by mouth every morning (before breakfast)  Historical Provider, MD        Social History     Tobacco Use    Smoking status: Current Every Day Smoker     Packs/day: 0.50     Years: 7.00     Pack years: 3.50    Smokeless tobacco: Never Used    Tobacco comment: 5-7 a day    Substance Use Topics    Alcohol use: No        Vitals:    06/14/19 1006   BP: 126/70   Site: Right Upper Arm   Position: Sitting   Pulse: 60   Temp: 98.2 °F (36.8 °C)   SpO2: 96%   Weight: 275 lb (124.7 kg)   Height: 6' 2\" (1.88 m)     Estimated body mass index is 35.31 kg/m² as calculated from the following:    Height as of this encounter: 6' 2\" (1.88 m). Weight as of this encounter: 275 lb (124.7 kg). Physical Exam   Constitutional: He is oriented to person, place, and time. HENT:   Head: Normocephalic and atraumatic. Eyes: Pupils are equal, round, and reactive to light. Conjunctivae and EOM are normal. No scleral icterus. Neck: Neck supple. No thyromegaly present. Cardiovascular: Normal rate, regular rhythm, normal heart sounds and intact distal pulses. No murmur heard. Pulmonary/Chest: Effort normal and breath sounds normal. He has no rales. Abdominal: Soft. Bowel sounds are normal. He exhibits no distension. There is no tenderness. Musculoskeletal: Normal range of motion. He exhibits no edema. Legs:  Lymphadenopathy:     He has no cervical adenopathy. Neurological: He is alert and oriented to person, place, and time. No cranial nerve deficit. Skin: Skin is warm and dry. No rash noted. No erythema. Psychiatric: Judgment normal.       ASSESSMENT/PLAN:  1. Chronic pain syndrome  At this time we will treat symptomatically. Patient has been stable with current medication regimen. State monitoring program reviewed without signs of abuse or diversion. Medications refilled along with medications for breakthrough pain. - oxyCODONE (OXYCONTIN) 10 MG extended release tablet; Take 1 tablet by mouth every 12 hours for 30 days. Dispense: 60 each; Refill: 0  - oxyCODONE (OXYCONTIN) 10 MG extended release tablet; Take 1 tablet by mouth 2 times daily for 30 days. Intended supply: 30 days  Dispense: 60 tablet; Refill: 0  - oxyCODONE (OXYCONTIN) 10 MG extended release tablet;  Take 1 tablet by mouth 2 times daily for 30 days. Intended supply: 30 days  Dispense: 60 tablet; Refill: 0  - HYDROcodone-acetaminophen (NORCO) 5-325 MG per tablet; Take 1 tablet by mouth every 6 hours as needed for Pain for up to 30 days. Intended supply: 30 days  For breakthrough  Dispense: 120 tablet; Refill: 0  - POCT glycosylated hemoglobin (Hb A1C)    2. Hypertension, unspecified type  Currently stable on medication regimen. Under JNC 8 guidelines for diabetic. Currently asymptomatic. Medications refilled. No adjustments made today. 3. PVD (peripheral vascular disease) ([facility]  As noted right above-the-knee amputation. Currently undergoing prosthesis fitting. Has been going to physical therapy twice weekly and doing quite well. 4. Stage 3 chronic kidney disease ([facility]  Stable per recent labs    5. Mixed hyperlipidemia  Stable per recent labs    6. S/P AKA (above knee amputation), right ([facility]  As noted above    7. Controlled type 2 diabetes mellitus with other specified complication, with long-term current use of insulin (HCC)  Currently stable with the recent adjustment to medications. No episodes of hypoglycemia. A1c today is 7.7. We will see him back in 4 to 6 months or sooner if needed. Return in about 6 months (around 12/14/2019). An electronic signature was used to authenticate this note.     --Martha Vargas DO on 6/14/2019 at 5:04 PM

## 2019-06-27 ENCOUNTER — TELEPHONE (OUTPATIENT)
Dept: FAMILY MEDICINE CLINIC | Age: 64
End: 2019-06-27

## 2019-07-08 NOTE — TELEPHONE ENCOUNTER
Pharmacist called and said oxycotin immediate release is covered no copay. The extended release is not covered. Asking if okay to change? Also you sent three scripts over they are unable to fill, oxycotin scripts only good for 14 days after you write. Pt will have to be seen monthly.        [phone]

## 2019-07-11 ENCOUNTER — OFFICE VISIT (OUTPATIENT)
Dept: PRIMARY CARE CLINIC | Age: 64
End: 2019-07-11
Payer: COMMERCIAL

## 2019-07-11 VITALS
DIASTOLIC BLOOD PRESSURE: 66 MMHG | BODY MASS INDEX: 35.29 KG/M2 | OXYGEN SATURATION: 97 % | TEMPERATURE: 97.7 F | HEIGHT: 74 IN | HEART RATE: 60 BPM | SYSTOLIC BLOOD PRESSURE: 126 MMHG | WEIGHT: 275 LBS

## 2019-07-11 DIAGNOSIS — I73.9 PVD (PERIPHERAL VASCULAR DISEASE) (HCC): ICD-10-CM

## 2019-07-11 DIAGNOSIS — N18.30 STAGE 3 CHRONIC KIDNEY DISEASE (HCC): ICD-10-CM

## 2019-07-11 DIAGNOSIS — I10 HYPERTENSION, UNSPECIFIED TYPE: Primary | ICD-10-CM

## 2019-07-11 DIAGNOSIS — E11.69 CONTROLLED TYPE 2 DIABETES MELLITUS WITH OTHER SPECIFIED COMPLICATION, WITH LONG-TERM CURRENT USE OF INSULIN (HCC): ICD-10-CM

## 2019-07-11 DIAGNOSIS — G89.4 CHRONIC PAIN SYNDROME: ICD-10-CM

## 2019-07-11 DIAGNOSIS — E78.2 MIXED HYPERLIPIDEMIA: ICD-10-CM

## 2019-07-11 DIAGNOSIS — Z79.4 CONTROLLED TYPE 2 DIABETES MELLITUS WITH OTHER SPECIFIED COMPLICATION, WITH LONG-TERM CURRENT USE OF INSULIN (HCC): ICD-10-CM

## 2019-07-11 DIAGNOSIS — G47.33 OSA (OBSTRUCTIVE SLEEP APNEA): ICD-10-CM

## 2019-07-11 DIAGNOSIS — Z89.611 S/P AKA (ABOVE KNEE AMPUTATION), RIGHT (HCC): ICD-10-CM

## 2019-07-11 PROCEDURE — 99213 OFFICE O/P EST LOW 20 MIN: CPT | Performed by: FAMILY MEDICINE

## 2019-07-11 RX ORDER — HYDROCODONE BITARTRATE AND ACETAMINOPHEN 5; 325 MG/1; MG/1
1 TABLET ORAL EVERY 6 HOURS PRN
Qty: 120 TABLET | Refills: 0 | Status: SHIPPED | OUTPATIENT
Start: 2019-07-11 | End: 2019-07-15 | Stop reason: SDUPTHER

## 2019-07-11 RX ORDER — OXYCODONE HCL 10 MG/1
10 TABLET, FILM COATED, EXTENDED RELEASE ORAL 2 TIMES DAILY
Qty: 60 TABLET | Refills: 0 | Status: SHIPPED | OUTPATIENT
Start: 2019-07-11 | End: 2019-08-10

## 2019-07-11 ASSESSMENT — PATIENT HEALTH QUESTIONNAIRE - PHQ9
2. FEELING DOWN, DEPRESSED OR HOPELESS: 0
SUM OF ALL RESPONSES TO PHQ QUESTIONS 1-9: 0
1. LITTLE INTEREST OR PLEASURE IN DOING THINGS: 0
SUM OF ALL RESPONSES TO PHQ9 QUESTIONS 1 & 2: 0
SUM OF ALL RESPONSES TO PHQ QUESTIONS 1-9: 0

## 2019-07-11 ASSESSMENT — ENCOUNTER SYMPTOMS
DIARRHEA: 0
BACK PAIN: 0
SHORTNESS OF BREATH: 0
CONSTIPATION: 0
BLOOD IN STOOL: 0
PHOTOPHOBIA: 0

## 2019-07-11 NOTE — PROGRESS NOTES
constipation and diarrhea. Endocrine: Negative for polydipsia. Genitourinary: Negative for dysuria, frequency, hematuria and urgency. Musculoskeletal: Negative for back pain. Right Sided above-the-knee amputation   Skin: Negative. Neurological: Negative for dizziness and tremors. Hematological: Does not bruise/bleed easily. Psychiatric/Behavioral: Negative for hallucinations and suicidal ideas. All other systems reviewed and are negative. Prior to Visit Medications    Medication Sig Taking? Authorizing Provider   cloNIDine (CATAPRES) 0.1 MG tablet Take 1 tablet by mouth 3 times daily Yes Juanito Kline, DO   amLODIPine (NORVASC) 10 MG tablet Take 1 tablet by mouth every morning Hold if sbp <140 Yes Juanito Kline, DO   metoprolol (LOPRESSOR) 100 MG tablet Take 1 tablet by mouth 2 times daily Yes Juanito Kline, DO   LANCETS MICRO THIN 89X MISC 1 applicator by Does not apply route daily Yes Juanito Kline, DO   blood glucose test strips (ASCENSIA AUTODISC VI;ONE TOUCH ULTRA TEST VI) strip 1 each by In Vitro route daily As needed. Yes Juanito Kline, DO   HYDROcodone-acetaminophen (NORCO) 5-325 MG per tablet Take 1 tablet by mouth every 6 hours as needed for Pain for up to 30 days.  Intended supply: 30 days  For breakthrough Yes Juanito Kline, DO   Insulin Syringe-Needle U-100 30G X 5/16\" 0.3 ML MISC 1 box by Does not apply route 5 times daily Yes Juanito Kline, DO   insulin glargine (LANTUS) 100 UNIT/ML injection vial Inject 15 Units into the skin 2 times daily  Patient taking differently: Inject 25 Units into the skin 2 times daily  Yes Sharon Oliveira MD   ferrous sulfate 325 (65 Fe) MG tablet Take 1 tablet by mouth 2 times daily (with meals) Yes Eddie Gomez MD   insulin lispro (HUMALOG) 100 UNIT/ML injection vial Inject 5 Units into the skin 3 times daily (with meals) Yes Eddie Gomez MD   cilostazol (PLETAL) 100 MG tablet Take 100 mg by mouth 2 times daily Yes Historical Provider, MD   pregabalin (LYRICA) 100 MG capsule Take 200 mg by mouth 3 times daily. . Yes Historical Provider, MD   atorvastatin (LIPITOR) 10 MG tablet Take 10 mg by mouth daily  Yes Historical Provider, MD   DULoxetine (CYMBALTA) 60 MG extended release capsule Take 2 capsules by mouth every morning  Juanito Kline DO   oxyCODONE (OXYCONTIN) 10 MG extended release tablet Take 1 tablet by mouth every 12 hours for 30 days. Juanito Kline DO   oxyCODONE (OXYCONTIN) 10 MG extended release tablet Take 1 tablet by mouth 2 times daily for 30 days. Intended supply: 30 days  Juainto Kline DO   LYRICA 300 MG capsule Take 1 capsule by mouth 2 times daily for 30 days. Brandi Perkins MD   HYDROcodone-acetaminophen (NORCO) 5-325 MG per tablet Take 1 tablet by mouth every 4 hours as needed for Pain for up to 30 days. Jeffrey Andi Date: 3/10/19  Yessi Russ MD   lidocaine (LIDODERM) 5 % Place 1 patch onto the skin daily 12 hours on, 12 hours off.   Yessi Russ MD   aspirin 81 MG chewable tablet Take 1 tablet by mouth daily  Devang Green MD   lisinopril (PRINIVIL;ZESTRIL) 10 MG tablet Take 1 tablet by mouth daily Hold if sbp<140  Devang Green MD   tiZANidine (ZANAFLEX) 4 MG tablet Take 4 mg by mouth every 8 hours as needed   Historical Provider, MD   spironolactone (ALDACTONE) 50 MG tablet Take 50 mg by mouth every morning   Historical Provider, MD   esomeprazole (NEXIUM) 40 MG delayed release capsule Take 40 mg by mouth every morning (before breakfast)  Historical Provider, MD        Social History     Tobacco Use    Smoking status: Current Every Day Smoker     Packs/day: 0.50     Years: 7.00     Pack years: 3.50    Smokeless tobacco: Never Used    Tobacco comment: 5-7 a day    Substance Use Topics    Alcohol use: No        Vitals:    07/11/19 1520   BP: 126/66   Pulse: 60   Temp: 97.7 °F (36.5 °C)   TempSrc: Tympanic   SpO2: 97%   Weight: 275 lb (124.7 kg)   Height: 6' 2\" (1.88 m)     Estimated body mass index is 35.31 kg/m² as calculated from the following:    Height as of this encounter: 6' 2\" (1.88 m). Weight as of this encounter: 275 lb (124.7 kg). Physical Exam   Constitutional: He is oriented to person, place, and time. HENT:   Head: Normocephalic and atraumatic. Eyes: Pupils are equal, round, and reactive to light. Conjunctivae and EOM are normal. No scleral icterus. Neck: Neck supple. No thyromegaly present. Cardiovascular: Normal rate, regular rhythm, normal heart sounds and intact distal pulses. No murmur heard. Pulmonary/Chest: Effort normal and breath sounds normal. He has no rales. Abdominal: Soft. Bowel sounds are normal. He exhibits no distension. There is no tenderness. Musculoskeletal: Normal range of motion. He exhibits no edema. Legs:  Lymphadenopathy:     He has no cervical adenopathy. Neurological: He is alert and oriented to person, place, and time. No cranial nerve deficit. Skin: Skin is warm and dry. No rash noted. No erythema. Psychiatric: Judgment normal.       Assessment/Plan:   Diagnosis Orders   1. Hypertension, unspecified type     2. Chronic pain syndrome  HYDROcodone-acetaminophen (NORCO) 5-325 MG per tablet    oxyCODONE (OXYCONTIN) 10 MG extended release tablet    DME Order for Crutches as OP    DME Order for Onita Bonds as OP    DME Order for Wheel Chair as OP    DME Order for [address] [place] [address] as OP   3. Controlled type 2 diabetes mellitus with other specified complication, with long-term current use of insulin ([facility]  DME Order for Crutches as OP    DME Order for Onita Bonds as OP    DME Order for Wheel Chair as OP    DME Order for [address] [place] [address] as OP   4. PVD (peripheral vascular disease) ([facility] [address] [facility]  DME Order for Crutches as OP    DME Order for Onita Bonds as OP    DME Order for Wheel Chair as OP    DME Order for [address] [place] [address] as OP   5. Stage 3 chronic kidney disease ([facility]     6. Mixed hyperlipidemia     7.  S/P AKA (above knee amputation), right [facility])  DME Order for Crutches as OP    DME Order for Rosita Gonzalezin as OP    DME Order for Wheel Chair as OP    DME Order for [address] as OP   8. RAGHU (obstructive sleep apnea)  DME Order for CPAP as OP     State monitoring program reviewed without signs of abuse or diversion. Medications refilled. We reordered 1 of the supposedly covered medications for his pain. Other medications also refilled. Multiple DME orders for CPAP and ambulatory assistance devices were sent to [address] and [facility] respectively. See him back in 2 to 3 months or sooner if needed. Medications will be refilled in the interim if needed. Counseled regarding above diagnosis, including possible risks and complications,  especially if left uncontrolled. Counseled regarding the possible side effects, risks, benefits and alternatives to treatment; patient and/or guardian verbalizes understanding, agrees, feels comfortable with and wishes to proceed with above treatment plan. Call or go to ED immediately if symptoms worsen or persist. Advised patient to call with any new medication issues, and, as applicable, read allRx info from pharmacy to assure aware of all possible risks and side effects of medication before taking. As applicable, educational materialsand/or home exercises printed for patient's review and were included in patient instructions on his/her After Visit Summary and given to patient at the end of visit. Patient and/or guardian given opportunity to ask questions/raise concerns. The patient verbalized comfort and understanding ofinstructions. Lara Rai D.O.   4:05 PM  7/12/2019       This document may have been prepared at least partially through the use of voice recognition software. Although effort is taken to assure the accuracy of this document, it is possible that grammatical, syntax,  or spelling errors may occur.

## 2019-07-12 ENCOUNTER — TELEPHONE (OUTPATIENT)
Dept: PRIMARY CARE CLINIC | Age: 64
End: 2019-07-12

## 2019-07-12 DIAGNOSIS — G89.4 CHRONIC PAIN SYNDROME: Primary | ICD-10-CM

## 2019-07-12 RX ORDER — NALOXONE HYDROCHLORIDE 4 MG/.1ML
1 SPRAY NASAL PRN
Qty: 1 EACH | Refills: 1 | Status: ON HOLD
Start: 2019-07-12 | End: 2020-03-26 | Stop reason: HOSPADM

## 2019-07-15 DIAGNOSIS — G89.4 CHRONIC PAIN SYNDROME: ICD-10-CM

## 2019-07-15 RX ORDER — HYDROCODONE BITARTRATE AND ACETAMINOPHEN 5; 325 MG/1; MG/1
1 TABLET ORAL EVERY 6 HOURS PRN
Qty: 120 TABLET | Refills: 0 | Status: SHIPPED | OUTPATIENT
Start: 2019-07-15 | End: 2019-08-08 | Stop reason: SDUPTHER

## 2019-07-17 ENCOUNTER — TELEPHONE (OUTPATIENT)
Dept: PRIMARY CARE CLINIC | Age: 64
End: 2019-07-17

## 2019-07-26 DIAGNOSIS — Z89.611 S/P AKA (ABOVE KNEE AMPUTATION), RIGHT (HCC): Primary | ICD-10-CM

## 2019-07-31 DIAGNOSIS — Z89.611 S/P AKA (ABOVE KNEE AMPUTATION), RIGHT (HCC): Primary | ICD-10-CM

## 2019-07-31 DIAGNOSIS — Z79.4 CONTROLLED TYPE 2 DIABETES MELLITUS WITH OTHER SPECIFIED COMPLICATION, WITH LONG-TERM CURRENT USE OF INSULIN (HCC): ICD-10-CM

## 2019-07-31 DIAGNOSIS — E11.69 CONTROLLED TYPE 2 DIABETES MELLITUS WITH OTHER SPECIFIED COMPLICATION, WITH LONG-TERM CURRENT USE OF INSULIN (HCC): ICD-10-CM

## 2019-08-08 DIAGNOSIS — G89.4 CHRONIC PAIN SYNDROME: ICD-10-CM

## 2019-08-08 RX ORDER — HYDROCODONE BITARTRATE AND ACETAMINOPHEN 5; 325 MG/1; MG/1
1 TABLET ORAL EVERY 6 HOURS PRN
Qty: 120 TABLET | Refills: 0 | Status: SHIPPED | OUTPATIENT
Start: 2019-08-08 | End: 2019-09-10 | Stop reason: SDUPTHER

## 2019-09-10 DIAGNOSIS — G89.4 CHRONIC PAIN SYNDROME: ICD-10-CM

## 2019-09-10 RX ORDER — HYDROCODONE BITARTRATE AND ACETAMINOPHEN 5; 325 MG/1; MG/1
1 TABLET ORAL EVERY 6 HOURS PRN
Qty: 120 TABLET | Refills: 0 | Status: SHIPPED | OUTPATIENT
Start: 2019-09-10 | End: 2019-10-10 | Stop reason: SDUPTHER

## 2019-09-10 RX ORDER — NALOXONE HYDROCHLORIDE 4 MG/.1ML
1 SPRAY NASAL PRN
Qty: 1 EACH | Refills: 5 | Status: ON HOLD
Start: 2019-09-10 | End: 2020-03-26 | Stop reason: HOSPADM

## 2019-10-10 DIAGNOSIS — G89.4 CHRONIC PAIN SYNDROME: ICD-10-CM

## 2019-10-10 DIAGNOSIS — S78.111A ABOVE KNEE AMPUTATION OF RIGHT LOWER EXTREMITY (HCC): ICD-10-CM

## 2019-10-10 RX ORDER — AMLODIPINE BESYLATE 10 MG/1
10 TABLET ORAL EVERY MORNING
Qty: 30 TABLET | Refills: 3 | Status: SHIPPED | OUTPATIENT
Start: 2019-10-10 | End: 2020-02-04

## 2019-10-10 RX ORDER — CLONIDINE HYDROCHLORIDE 0.1 MG/1
0.1 TABLET ORAL 3 TIMES DAILY
Qty: 60 TABLET | Refills: 3 | Status: SHIPPED | OUTPATIENT
Start: 2019-10-10 | End: 2020-02-04

## 2019-10-10 RX ORDER — HYDROCODONE BITARTRATE AND ACETAMINOPHEN 5; 325 MG/1; MG/1
1 TABLET ORAL EVERY 6 HOURS PRN
Qty: 120 TABLET | Refills: 0 | Status: SHIPPED | OUTPATIENT
Start: 2019-10-10 | End: 2019-10-21 | Stop reason: SDUPTHER

## 2019-10-11 DIAGNOSIS — S78.111A ABOVE KNEE AMPUTATION OF RIGHT LOWER EXTREMITY (HCC): ICD-10-CM

## 2019-10-11 RX ORDER — PREGABALIN 300 MG/1
300 CAPSULE ORAL 2 TIMES DAILY
Qty: 60 CAPSULE | Refills: 3 | Status: ON HOLD
Start: 2019-10-11 | End: 2020-03-26 | Stop reason: HOSPADM

## 2019-10-11 RX ORDER — DULOXETIN HYDROCHLORIDE 60 MG/1
120 CAPSULE, DELAYED RELEASE ORAL EVERY MORNING
Qty: 60 CAPSULE | Refills: 3 | Status: SHIPPED | OUTPATIENT
Start: 2019-10-11 | End: 2020-02-04

## 2019-10-21 ENCOUNTER — OFFICE VISIT (OUTPATIENT)
Dept: PRIMARY CARE CLINIC | Age: 64
End: 2019-10-21
Payer: COMMERCIAL

## 2019-10-21 VITALS — DIASTOLIC BLOOD PRESSURE: 72 MMHG | SYSTOLIC BLOOD PRESSURE: 128 MMHG | HEART RATE: 63 BPM | TEMPERATURE: 99 F

## 2019-10-21 DIAGNOSIS — I10 HYPERTENSION, UNSPECIFIED TYPE: ICD-10-CM

## 2019-10-21 DIAGNOSIS — Z23 NEED FOR PNEUMOCOCCAL VACCINE: ICD-10-CM

## 2019-10-21 DIAGNOSIS — S78.111A ABOVE KNEE AMPUTATION OF RIGHT LOWER EXTREMITY (HCC): ICD-10-CM

## 2019-10-21 DIAGNOSIS — Z12.5 ENCOUNTER FOR SCREENING FOR MALIGNANT NEOPLASM OF PROSTATE: ICD-10-CM

## 2019-10-21 DIAGNOSIS — Z23 NEED FOR INFLUENZA VACCINATION: ICD-10-CM

## 2019-10-21 DIAGNOSIS — E78.2 MIXED HYPERLIPIDEMIA: ICD-10-CM

## 2019-10-21 DIAGNOSIS — N18.30 STAGE 3 CHRONIC KIDNEY DISEASE (HCC): ICD-10-CM

## 2019-10-21 DIAGNOSIS — Z79.4 CONTROLLED TYPE 2 DIABETES MELLITUS WITH OTHER SPECIFIED COMPLICATION, WITH LONG-TERM CURRENT USE OF INSULIN (HCC): Primary | ICD-10-CM

## 2019-10-21 DIAGNOSIS — G89.4 CHRONIC PAIN SYNDROME: ICD-10-CM

## 2019-10-21 DIAGNOSIS — I73.9 PVD (PERIPHERAL VASCULAR DISEASE) (HCC): ICD-10-CM

## 2019-10-21 DIAGNOSIS — G47.33 OSA (OBSTRUCTIVE SLEEP APNEA): ICD-10-CM

## 2019-10-21 DIAGNOSIS — E11.69 CONTROLLED TYPE 2 DIABETES MELLITUS WITH OTHER SPECIFIED COMPLICATION, WITH LONG-TERM CURRENT USE OF INSULIN (HCC): Primary | ICD-10-CM

## 2019-10-21 PROCEDURE — G8599 NO ASA/ANTIPLAT THER USE RNG: HCPCS | Performed by: FAMILY MEDICINE

## 2019-10-21 PROCEDURE — 90732 PPSV23 VACC 2 YRS+ SUBQ/IM: CPT | Performed by: FAMILY MEDICINE

## 2019-10-21 PROCEDURE — 4004F PT TOBACCO SCREEN RCVD TLK: CPT | Performed by: FAMILY MEDICINE

## 2019-10-21 PROCEDURE — 90686 IIV4 VACC NO PRSV 0.5 ML IM: CPT | Performed by: FAMILY MEDICINE

## 2019-10-21 PROCEDURE — 2022F DILAT RTA XM EVC RTNOPTHY: CPT | Performed by: FAMILY MEDICINE

## 2019-10-21 PROCEDURE — G0009 ADMIN PNEUMOCOCCAL VACCINE: HCPCS | Performed by: FAMILY MEDICINE

## 2019-10-21 PROCEDURE — G8482 FLU IMMUNIZE ORDER/ADMIN: HCPCS | Performed by: FAMILY MEDICINE

## 2019-10-21 PROCEDURE — G8417 CALC BMI ABV UP PARAM F/U: HCPCS | Performed by: FAMILY MEDICINE

## 2019-10-21 PROCEDURE — 3017F COLORECTAL CA SCREEN DOC REV: CPT | Performed by: FAMILY MEDICINE

## 2019-10-21 PROCEDURE — G8427 DOCREV CUR MEDS BY ELIG CLIN: HCPCS | Performed by: FAMILY MEDICINE

## 2019-10-21 PROCEDURE — 99214 OFFICE O/P EST MOD 30 MIN: CPT | Performed by: FAMILY MEDICINE

## 2019-10-21 PROCEDURE — G0008 ADMIN INFLUENZA VIRUS VAC: HCPCS | Performed by: FAMILY MEDICINE

## 2019-10-21 RX ORDER — HYDROCODONE BITARTRATE AND ACETAMINOPHEN 5; 325 MG/1; MG/1
1 TABLET ORAL EVERY 6 HOURS PRN
Qty: 120 TABLET | Refills: 0 | Status: SHIPPED | OUTPATIENT
Start: 2019-11-08 | End: 2020-02-04 | Stop reason: SDUPTHER

## 2019-10-21 ASSESSMENT — ENCOUNTER SYMPTOMS
BACK PAIN: 0
PHOTOPHOBIA: 0
BLOOD IN STOOL: 0
DIARRHEA: 0
SHORTNESS OF BREATH: 0
CONSTIPATION: 0

## 2019-11-11 RX ORDER — METOPROLOL TARTRATE 100 MG/1
100 TABLET ORAL 2 TIMES DAILY
Qty: 60 TABLET | Refills: 1 | Status: SHIPPED | OUTPATIENT
Start: 2019-11-11 | End: 2020-01-08

## 2019-11-11 RX ORDER — INSULIN GLARGINE 100 [IU]/ML
15 INJECTION, SOLUTION SUBCUTANEOUS 2 TIMES DAILY
Qty: 1 VIAL | Refills: 3 | Status: SHIPPED | OUTPATIENT
Start: 2019-11-11 | End: 2020-02-04

## 2019-12-09 DIAGNOSIS — I73.9 PVD (PERIPHERAL VASCULAR DISEASE) (HCC): ICD-10-CM

## 2019-12-09 DIAGNOSIS — G47.33 OSA (OBSTRUCTIVE SLEEP APNEA): ICD-10-CM

## 2019-12-09 DIAGNOSIS — E78.2 MIXED HYPERLIPIDEMIA: ICD-10-CM

## 2019-12-09 DIAGNOSIS — Z79.4 CONTROLLED TYPE 2 DIABETES MELLITUS WITH OTHER SPECIFIED COMPLICATION, WITH LONG-TERM CURRENT USE OF INSULIN (HCC): ICD-10-CM

## 2019-12-09 DIAGNOSIS — E11.69 CONTROLLED TYPE 2 DIABETES MELLITUS WITH OTHER SPECIFIED COMPLICATION, WITH LONG-TERM CURRENT USE OF INSULIN (HCC): ICD-10-CM

## 2019-12-09 DIAGNOSIS — N18.30 STAGE 3 CHRONIC KIDNEY DISEASE (HCC): ICD-10-CM

## 2019-12-09 DIAGNOSIS — I10 HYPERTENSION, UNSPECIFIED TYPE: ICD-10-CM

## 2019-12-09 DIAGNOSIS — G89.4 CHRONIC PAIN SYNDROME: ICD-10-CM

## 2019-12-09 DIAGNOSIS — S78.111A ABOVE KNEE AMPUTATION OF RIGHT LOWER EXTREMITY (HCC): ICD-10-CM

## 2019-12-09 RX ORDER — HYDROCODONE BITARTRATE AND ACETAMINOPHEN 5; 325 MG/1; MG/1
1 TABLET ORAL EVERY 4 HOURS PRN
Qty: 120 TABLET | Refills: 0 | Status: SHIPPED | OUTPATIENT
Start: 2019-12-09 | End: 2020-01-03 | Stop reason: SDUPTHER

## 2020-01-04 RX ORDER — HYDROCODONE BITARTRATE AND ACETAMINOPHEN 5; 325 MG/1; MG/1
1 TABLET ORAL EVERY 4 HOURS PRN
Qty: 120 TABLET | Refills: 0 | Status: ON HOLD
Start: 2020-01-04 | End: 2020-03-26 | Stop reason: HOSPADM

## 2020-01-08 RX ORDER — METOPROLOL TARTRATE 100 MG/1
TABLET ORAL
Qty: 60 TABLET | Refills: 3 | Status: ON HOLD
Start: 2020-01-08 | End: 2020-06-23 | Stop reason: HOSPADM

## 2020-01-15 ENCOUNTER — TELEPHONE (OUTPATIENT)
Dept: PRIMARY CARE CLINIC | Age: 65
End: 2020-01-15

## 2020-01-20 ENCOUNTER — OFFICE VISIT (OUTPATIENT)
Dept: PRIMARY CARE CLINIC | Age: 65
End: 2020-01-20
Payer: COMMERCIAL

## 2020-01-20 VITALS — TEMPERATURE: 97.1 F | HEART RATE: 85 BPM | DIASTOLIC BLOOD PRESSURE: 82 MMHG | SYSTOLIC BLOOD PRESSURE: 154 MMHG

## 2020-01-20 PROCEDURE — 4004F PT TOBACCO SCREEN RCVD TLK: CPT | Performed by: FAMILY MEDICINE

## 2020-01-20 PROCEDURE — G8417 CALC BMI ABV UP PARAM F/U: HCPCS | Performed by: FAMILY MEDICINE

## 2020-01-20 PROCEDURE — G8482 FLU IMMUNIZE ORDER/ADMIN: HCPCS | Performed by: FAMILY MEDICINE

## 2020-01-20 PROCEDURE — 3046F HEMOGLOBIN A1C LEVEL >9.0%: CPT | Performed by: FAMILY MEDICINE

## 2020-01-20 PROCEDURE — G8427 DOCREV CUR MEDS BY ELIG CLIN: HCPCS | Performed by: FAMILY MEDICINE

## 2020-01-20 PROCEDURE — 3017F COLORECTAL CA SCREEN DOC REV: CPT | Performed by: FAMILY MEDICINE

## 2020-01-20 PROCEDURE — 2022F DILAT RTA XM EVC RTNOPTHY: CPT | Performed by: FAMILY MEDICINE

## 2020-01-20 PROCEDURE — 99213 OFFICE O/P EST LOW 20 MIN: CPT | Performed by: FAMILY MEDICINE

## 2020-01-20 RX ORDER — LUBIPROSTONE 24 UG/1
24 CAPSULE, GELATIN COATED ORAL 2 TIMES DAILY WITH MEALS
Qty: 60 CAPSULE | Refills: 3 | Status: ON HOLD
Start: 2020-01-20 | End: 2020-06-23 | Stop reason: HOSPADM

## 2020-01-20 ASSESSMENT — PATIENT HEALTH QUESTIONNAIRE - PHQ9
SUM OF ALL RESPONSES TO PHQ QUESTIONS 1-9: 0
SUM OF ALL RESPONSES TO PHQ QUESTIONS 1-9: 0
SUM OF ALL RESPONSES TO PHQ9 QUESTIONS 1 & 2: 0
1. LITTLE INTEREST OR PLEASURE IN DOING THINGS: 0
2. FEELING DOWN, DEPRESSED OR HOPELESS: 0

## 2020-01-20 ASSESSMENT — ENCOUNTER SYMPTOMS
BACK PAIN: 0
BLOOD IN STOOL: 0
DIARRHEA: 0
PHOTOPHOBIA: 0
CONSTIPATION: 1
SHORTNESS OF BREATH: 0

## 2020-01-20 NOTE — PROGRESS NOTES
however that there has been a slight increase in his blood sugars over the last 1 to 2 weeks. Obstructive sleep apnea  Patient continues to use CPAP as directed. Patient states that it is still working as indicated and has been compliant without issue. Patient also presents post dental surgery. Patient had all of the teeth removed. Has loose sutures in the left upper position. Causing him irritation. Patient states he is having a significant increase in pain in the phantom limb. Has had no other adjustments to medications made in the last several months. Still participating in physical therapy without much difficulty. Relates opiate-induced constipation. Has been using over-the-counter medications with some relief in symptoms. Review of Systems   HENT: Negative for hearing loss and nosebleeds. Eyes: Negative for photophobia. Respiratory: Negative for shortness of breath. Cardiovascular: Negative for palpitations and leg swelling. Gastrointestinal: Positive for constipation. Negative for blood in stool and diarrhea. Endocrine: Negative for polydipsia. Genitourinary: Negative for dysuria, frequency, hematuria and urgency. Musculoskeletal: Positive for arthralgias, gait problem and myalgias. Negative for back pain. Right Sided above-the-knee amputation   Skin: Negative. Neurological: Positive for numbness. Negative for dizziness and tremors. Hematological: Does not bruise/bleed easily. Psychiatric/Behavioral: Positive for sleep disturbance. Negative for hallucinations, self-injury and suicidal ideas. All other systems reviewed and are negative. Prior to Visit Medications    Medication Sig Taking?  Authorizing Provider   lubiprostone (AMITIZA) 24 MCG capsule Take 1 capsule by mouth 2 times daily (with meals) Yes Juanito Kline, DO   metoprolol (LOPRESSOR) 100 MG tablet TAKE 1 TABLET BY MOUTH 2 TIMES A DAY  Juanito Kline, DO   oxyCODONE ER (XTAMPZA ER) 13.5 MG meals)  Patient not taking: Reported on 10/21/2019  Eddie Gomez MD   lidocaine (LIDODERM) 5 % Place 1 patch onto the skin daily 12 hours on, 12 hours off. Mary Carmen Galvin MD   aspirin 81 MG chewable tablet Take 1 tablet by mouth daily  Devang Green MD   lisinopril (PRINIVIL;ZESTRIL) 10 MG tablet Take 1 tablet by mouth daily Hold if sbp<140  Devang Green MD   cilostazol (PLETAL) 100 MG tablet Take 100 mg by mouth 2 times daily  Historical Provider, MD   tiZANidine (ZANAFLEX) 4 MG tablet Take 4 mg by mouth every 8 hours as needed   Historical Provider, MD   pregabalin (LYRICA) 100 MG capsule Take 200 mg by mouth 3 times daily. Canda Nay Historical Provider, MD   spironolactone (ALDACTONE) 50 MG tablet Take 50 mg by mouth every morning   Historical Provider, MD   atorvastatin (LIPITOR) 10 MG tablet Take 10 mg by mouth daily   Historical Provider, MD   esomeprazole (NEXIUM) 40 MG delayed release capsule Take 40 mg by mouth every morning (before breakfast)  Historical Provider, MD        Social History     Tobacco Use    Smoking status: Current Every Day Smoker     Packs/day: 0.50     Years: 7.00     Pack years: 3.50     Types: Cigarettes    Smokeless tobacco: Never Used    Tobacco comment: 5-7 a day    Substance Use Topics    Alcohol use: No        Vitals:    01/20/20 1501   BP: (!) 154/82   Site: Right Upper Arm   Position: Sitting   Pulse: 85   Temp: 97.1 °F (36.2 °C)   TempSrc: Temporal     Estimated body mass index is 35.31 kg/m² as calculated from the following:    Height as of 7/11/19: 6' 2\" (1.88 m). Weight as of 7/11/19: 275 lb (124.7 kg). Physical Exam  HENT:      Head: Normocephalic and atraumatic. Mouth/Throat:      Dentition: Abnormal dentition (edentulous upper). Eyes:      General: No scleral icterus. Conjunctiva/sclera: Conjunctivae normal.      Pupils: Pupils are equal, round, and reactive to light. Neck:      Musculoskeletal: Neck supple. Thyroid: No thyromegaly. Cardiovascular:      Rate and Rhythm: Normal rate and regular rhythm. Heart sounds: Normal heart sounds. No murmur. Pulmonary:      Effort: Pulmonary effort is normal.      Breath sounds: Normal breath sounds. No rales. Abdominal:      General: Bowel sounds are decreased. There is no distension. Palpations: Abdomen is soft. Tenderness: There is no tenderness. Musculoskeletal: Normal range of motion. Legs:    Lymphadenopathy:      Cervical: No cervical adenopathy. Skin:     General: Skin is warm and dry. Findings: No erythema or rash. Neurological:      Mental Status: He is alert and oriented to person, place, and time. Cranial Nerves: No cranial nerve deficit. Psychiatric:         Judgment: Judgment normal.         Consent obtained to trim the ends of his dissolvable sutures where the previous tooth extraction took place. Ends were grasped with forceps and trimmed with scissors. There is a small area of abrasion to the left upper lip during the procedure. Wound care explained to patient. If there are any issues he is advised to contact our office. Assessment/Plan:   Diagnosis Orders   1. Controlled type 2 diabetes mellitus with other specified complication, with long-term current use of insulin ([address]     2. Therapeutic opioid-induced constipation (OIC)  lubiprostone (AMITIZA) 24 MCG capsule   3. Chronic pain syndrome     4. Hypertension, unspecified type     5. Stage 3 chronic kidney disease ([address]     6. PVD (peripheral vascular disease) ([address]     7. Mixed hyperlipidemia     8. RAGHU (obstructive sleep apnea)     9. Above knee amputation of right lower extremity ([address]       State monitoring program reviewed without any signs of abuse or diversion. Still achieving adequate analgesia and allowing him to participate in activities of daily living. Continues to participate in physical therapy as well.   Only using his prosthetic intermittently secondary to continued pain.  We will send over medication for the opioid-induced constipation. Patient states that his blood sugars have been running well however he did not get his most recent labs. We will have him get them done today before he leaves. Adjustment of medications will be based on results. Medications were recently refilled. No other issues currently. Dwayne Russell D.O.   4:17 PM  1/20/2020       This document may have been prepared at least partially through the use of voice recognition software. Although effort is taken to assure the accuracy of this document, it is possible that grammatical, syntax,  or spelling errors may occur.

## 2020-02-04 RX ORDER — CLONIDINE HYDROCHLORIDE 0.1 MG/1
TABLET ORAL
Qty: 60 TABLET | Refills: 3 | Status: SHIPPED
Start: 2020-02-04 | End: 2020-09-18

## 2020-02-04 RX ORDER — DULOXETIN HYDROCHLORIDE 60 MG/1
120 CAPSULE, DELAYED RELEASE ORAL EVERY MORNING
Qty: 60 CAPSULE | Refills: 3 | Status: SHIPPED
Start: 2020-02-04 | End: 2020-09-15

## 2020-02-04 RX ORDER — AMLODIPINE BESYLATE 10 MG/1
TABLET ORAL
Qty: 30 TABLET | Refills: 3 | Status: ON HOLD
Start: 2020-02-04 | End: 2020-04-16

## 2020-02-04 RX ORDER — INSULIN GLARGINE 100 [IU]/ML
INJECTION, SOLUTION SUBCUTANEOUS
Qty: 30 ML | Refills: 3 | Status: ON HOLD
Start: 2020-02-04 | End: 2020-06-23 | Stop reason: HOSPADM

## 2020-02-04 RX ORDER — HYDROCODONE BITARTRATE AND ACETAMINOPHEN 5; 325 MG/1; MG/1
1 TABLET ORAL EVERY 6 HOURS PRN
Qty: 120 TABLET | Refills: 0 | Status: SHIPPED
Start: 2020-02-04 | End: 2020-03-06 | Stop reason: SDUPTHER

## 2020-03-06 RX ORDER — HYDROCODONE BITARTRATE AND ACETAMINOPHEN 5; 325 MG/1; MG/1
1 TABLET ORAL EVERY 6 HOURS PRN
Qty: 120 TABLET | Refills: 0 | Status: ON HOLD
Start: 2020-03-06 | End: 2020-03-26 | Stop reason: HOSPADM

## 2020-03-20 ENCOUNTER — APPOINTMENT (OUTPATIENT)
Dept: GENERAL RADIOLOGY | Age: 65
DRG: 270 | End: 2020-03-20
Payer: COMMERCIAL

## 2020-03-20 ENCOUNTER — APPOINTMENT (OUTPATIENT)
Dept: CT IMAGING | Age: 65
DRG: 270 | End: 2020-03-20
Payer: COMMERCIAL

## 2020-03-20 ENCOUNTER — ANESTHESIA (OUTPATIENT)
Dept: OPERATING ROOM | Age: 65
DRG: 270 | End: 2020-03-20
Payer: COMMERCIAL

## 2020-03-20 ENCOUNTER — ANESTHESIA EVENT (OUTPATIENT)
Dept: OPERATING ROOM | Age: 65
DRG: 270 | End: 2020-03-20
Payer: COMMERCIAL

## 2020-03-20 ENCOUNTER — HOSPITAL ENCOUNTER (INPATIENT)
Age: 65
LOS: 7 days | Discharge: NURSING, REHAB OR LONG-TERM CARE | DRG: 270 | End: 2020-03-27
Attending: EMERGENCY MEDICINE | Admitting: INTERNAL MEDICINE
Payer: COMMERCIAL

## 2020-03-20 PROBLEM — E11.65 HYPERGLYCEMIA DUE TO TYPE 2 DIABETES MELLITUS (HCC): Status: ACTIVE | Noted: 2020-03-20

## 2020-03-20 PROBLEM — I70.209 OCCLUSION OF COMMON FEMORAL ARTERY (HCC): Status: ACTIVE | Noted: 2020-03-20

## 2020-03-20 PROBLEM — N49.2 CELLULITIS, SCROTUM: Status: ACTIVE | Noted: 2020-03-20

## 2020-03-20 PROBLEM — I70.229 CRITICAL LOWER LIMB ISCHEMIA (HCC): Status: ACTIVE | Noted: 2020-03-20

## 2020-03-20 PROBLEM — A48.0: Status: ACTIVE | Noted: 2020-03-20

## 2020-03-20 LAB
ALBUMIN SERPL-MCNC: 3.1 G/DL (ref 3.5–5.2)
ALBUMIN SERPL-MCNC: 3.1 G/DL (ref 3.5–5.2)
ALP BLD-CCNC: 151 U/L (ref 40–129)
ALP BLD-CCNC: 166 U/L (ref 40–129)
ALT SERPL-CCNC: 29 U/L (ref 0–40)
ALT SERPL-CCNC: 30 U/L (ref 0–40)
ANION GAP SERPL CALCULATED.3IONS-SCNC: 11 MMOL/L (ref 7–16)
ANION GAP SERPL CALCULATED.3IONS-SCNC: 14 MMOL/L (ref 7–16)
APTT: 28 SEC (ref 24.5–35.1)
AST SERPL-CCNC: 37 U/L (ref 0–39)
AST SERPL-CCNC: 38 U/L (ref 0–39)
B.E.: -1.5 MMOL/L (ref -3–0)
BACTERIA: ABNORMAL /HPF
BASOPHILS ABSOLUTE: 0.04 E9/L (ref 0–0.2)
BASOPHILS RELATIVE PERCENT: 0.2 % (ref 0–2)
BETA-HYDROXYBUTYRATE: 0.28 MMOL/L (ref 0.02–0.27)
BILIRUB SERPL-MCNC: 0.4 MG/DL (ref 0–1.2)
BILIRUB SERPL-MCNC: 0.4 MG/DL (ref 0–1.2)
BILIRUBIN URINE: NEGATIVE
BLOOD, URINE: ABNORMAL
BUN BLDV-MCNC: 32 MG/DL (ref 8–23)
BUN BLDV-MCNC: 34 MG/DL (ref 8–23)
CALCIUM SERPL-MCNC: 9.4 MG/DL (ref 8.6–10.2)
CALCIUM SERPL-MCNC: 9.6 MG/DL (ref 8.6–10.2)
CARDIOPULMONARY BYPASS: NO
CHLORIDE BLD-SCNC: 94 MMOL/L (ref 98–107)
CHLORIDE BLD-SCNC: 94 MMOL/L (ref 98–107)
CLARITY: CLEAR
CO2: 24 MMOL/L (ref 22–29)
CO2: 25 MMOL/L (ref 22–29)
COLOR: YELLOW
CREAT SERPL-MCNC: 1.6 MG/DL (ref 0.7–1.2)
CREAT SERPL-MCNC: 1.6 MG/DL (ref 0.7–1.2)
DEVICE: ABNORMAL
EOSINOPHILS ABSOLUTE: 0 E9/L (ref 0.05–0.5)
EOSINOPHILS RELATIVE PERCENT: 0 % (ref 0–6)
FIO2 ARTERIAL: 50
GFR AFRICAN AMERICAN: 53
GFR AFRICAN AMERICAN: 53
GFR NON-AFRICAN AMERICAN: 53 ML/MIN/1.73
GFR NON-AFRICAN AMERICAN: 53 ML/MIN/1.73
GLUCOSE BLD-MCNC: 689 MG/DL (ref 74–99)
GLUCOSE BLD-MCNC: 787 MG/DL (ref 74–99)
GLUCOSE URINE: >=1000 MG/DL
HCO3 ARTERIAL: 22.8 MMOL/L (ref 22–26)
HCT (EST): 40 % (ref 37–54)
HCT VFR BLD CALC: 46.5 % (ref 37–54)
HEMOGLOBIN: 15 G/DL (ref 12.5–16.5)
HGB, (EST): 13.6 G/DL (ref 12.5–15.5)
IMMATURE GRANULOCYTES #: 0.21 E9/L
IMMATURE GRANULOCYTES %: 0.9 % (ref 0–5)
INR BLD: 1.3
KETONES, URINE: NEGATIVE MG/DL
LACTIC ACID: 2.2 MMOL/L (ref 0.5–2.2)
LEUKOCYTE ESTERASE, URINE: NEGATIVE
LYMPHOCYTES ABSOLUTE: 1.43 E9/L (ref 1.5–4)
LYMPHOCYTES RELATIVE PERCENT: 6.4 % (ref 20–42)
MCH RBC QN AUTO: 25.5 PG (ref 26–35)
MCHC RBC AUTO-ENTMCNC: 32.3 % (ref 32–34.5)
MCV RBC AUTO: 79.1 FL (ref 80–99.9)
METER GLUCOSE: 172 MG/DL (ref 74–99)
METER GLUCOSE: 268 MG/DL (ref 74–99)
MODE: ABNORMAL
MONOCYTES ABSOLUTE: 1.28 E9/L (ref 0.1–0.95)
MONOCYTES RELATIVE PERCENT: 5.8 % (ref 2–12)
NEUTROPHILS ABSOLUTE: 19.26 E9/L (ref 1.8–7.3)
NEUTROPHILS RELATIVE PERCENT: 86.7 % (ref 43–80)
NITRITE, URINE: NEGATIVE
O2 SATURATION: 99.3 % (ref 92–98.5)
OPERATOR ID: ABNORMAL
PATIENT TEMP: 36.4
PCO2 (TEMP CORRECTED): 35.4 MMHG (ref 35–45)
PDW BLD-RTO: 13.9 FL (ref 11.5–15)
PH (TEMPERATURE CORRECTED): 7.41 (ref 7.35–7.45)
PH UA: 5.5 (ref 5–9)
PH VENOUS: 7.39 (ref 7.35–7.45)
PLATELET # BLD: 194 E9/L (ref 130–450)
PMV BLD AUTO: ABNORMAL FL (ref 7–12)
PO2 (TEMP CORRECTED): 140.7 MMHG (ref 60–80)
POSITIVE END EXP PRESS: 7 CMH2O
POTASSIUM REFLEX MAGNESIUM: 4.9 MMOL/L (ref 3.5–5)
POTASSIUM SERPL-SCNC: 3.6 MMOL/L (ref 3.5–5.5)
POTASSIUM SERPL-SCNC: 4.9 MMOL/L (ref 3.5–5)
PRO-BNP: 3315 PG/ML (ref 0–125)
PROTEIN UA: 100 MG/DL
PROTHROMBIN TIME: 14.6 SEC (ref 9.3–12.4)
RBC # BLD: 5.88 E12/L (ref 3.8–5.8)
RBC UA: ABNORMAL /HPF (ref 0–2)
RESPIRATORY RATE: 16 B/MIN
SODIUM BLD-SCNC: 130 MMOL/L (ref 132–146)
SODIUM BLD-SCNC: 132 MMOL/L (ref 132–146)
SOURCE, BLOOD GAS: ABNORMAL
SPECIFIC GRAVITY UA: <=1.005 (ref 1–1.03)
TIDAL VOLUME: 650 ML
TOTAL PROTEIN: 7.7 G/DL (ref 6.4–8.3)
TOTAL PROTEIN: 7.7 G/DL (ref 6.4–8.3)
TROPONIN: 0.08 NG/ML (ref 0–0.03)
UROBILINOGEN, URINE: 0.2 E.U./DL
WBC # BLD: 22.2 E9/L (ref 4.5–11.5)
WBC UA: ABNORMAL /HPF (ref 0–5)

## 2020-03-20 PROCEDURE — 36415 COLL VENOUS BLD VENIPUNCTURE: CPT

## 2020-03-20 PROCEDURE — 2580000003 HC RX 258: Performed by: RADIOLOGY

## 2020-03-20 PROCEDURE — 6360000002 HC RX W HCPCS: Performed by: SURGERY

## 2020-03-20 PROCEDURE — 6360000002 HC RX W HCPCS: Performed by: EMERGENCY MEDICINE

## 2020-03-20 PROCEDURE — 2580000003 HC RX 258: Performed by: GENERAL PRACTICE

## 2020-03-20 PROCEDURE — 2580000003 HC RX 258: Performed by: NURSE ANESTHETIST, CERTIFIED REGISTERED

## 2020-03-20 PROCEDURE — 84484 ASSAY OF TROPONIN QUANT: CPT

## 2020-03-20 PROCEDURE — 73552 X-RAY EXAM OF FEMUR 2/>: CPT

## 2020-03-20 PROCEDURE — 86850 RBC ANTIBODY SCREEN: CPT

## 2020-03-20 PROCEDURE — 0J9L0ZZ DRAINAGE OF RIGHT UPPER LEG SUBCUTANEOUS TISSUE AND FASCIA, OPEN APPROACH: ICD-10-PCS | Performed by: STUDENT IN AN ORGANIZED HEALTH CARE EDUCATION/TRAINING PROGRAM

## 2020-03-20 PROCEDURE — 6360000002 HC RX W HCPCS: Performed by: GENERAL PRACTICE

## 2020-03-20 PROCEDURE — 87040 BLOOD CULTURE FOR BACTERIA: CPT

## 2020-03-20 PROCEDURE — 6370000000 HC RX 637 (ALT 250 FOR IP): Performed by: NURSE ANESTHETIST, CERTIFIED REGISTERED

## 2020-03-20 PROCEDURE — 75635 CT ANGIO ABDOMINAL ARTERIES: CPT

## 2020-03-20 PROCEDURE — 81001 URINALYSIS AUTO W/SCOPE: CPT

## 2020-03-20 PROCEDURE — 83605 ASSAY OF LACTIC ACID: CPT

## 2020-03-20 PROCEDURE — 6360000004 HC RX CONTRAST MEDICATION: Performed by: RADIOLOGY

## 2020-03-20 PROCEDURE — 85730 THROMBOPLASTIN TIME PARTIAL: CPT

## 2020-03-20 PROCEDURE — 2500000003 HC RX 250 WO HCPCS: Performed by: NURSE ANESTHETIST, CERTIFIED REGISTERED

## 2020-03-20 PROCEDURE — 3700000001 HC ADD 15 MINUTES (ANESTHESIA): Performed by: SURGERY

## 2020-03-20 PROCEDURE — 04CK0ZZ EXTIRPATION OF MATTER FROM RIGHT FEMORAL ARTERY, OPEN APPROACH: ICD-10-PCS | Performed by: STUDENT IN AN ORGANIZED HEALTH CARE EDUCATION/TRAINING PROGRAM

## 2020-03-20 PROCEDURE — 99285 EMERGENCY DEPT VISIT HI MDM: CPT

## 2020-03-20 PROCEDURE — 6370000000 HC RX 637 (ALT 250 FOR IP): Performed by: GENERAL PRACTICE

## 2020-03-20 PROCEDURE — 87077 CULTURE AEROBIC IDENTIFY: CPT

## 2020-03-20 PROCEDURE — 87088 URINE BACTERIA CULTURE: CPT

## 2020-03-20 PROCEDURE — 2580000003 HC RX 258: Performed by: SURGERY

## 2020-03-20 PROCEDURE — 04CE0ZZ EXTIRPATION OF MATTER FROM RIGHT INTERNAL ILIAC ARTERY, OPEN APPROACH: ICD-10-PCS | Performed by: STUDENT IN AN ORGANIZED HEALTH CARE EDUCATION/TRAINING PROGRAM

## 2020-03-20 PROCEDURE — 3600000015 HC SURGERY LEVEL 5 ADDTL 15MIN: Performed by: SURGERY

## 2020-03-20 PROCEDURE — 3600000005 HC SURGERY LEVEL 5 BASE: Performed by: SURGERY

## 2020-03-20 PROCEDURE — 85347 COAGULATION TIME ACTIVATED: CPT

## 2020-03-20 PROCEDURE — 82800 BLOOD PH: CPT

## 2020-03-20 PROCEDURE — 86901 BLOOD TYPING SEROLOGIC RH(D): CPT

## 2020-03-20 PROCEDURE — 2709999900 HC NON-CHARGEABLE SUPPLY: Performed by: SURGERY

## 2020-03-20 PROCEDURE — 2000000000 HC ICU R&B

## 2020-03-20 PROCEDURE — 96374 THER/PROPH/DIAG INJ IV PUSH: CPT

## 2020-03-20 PROCEDURE — 86900 BLOOD TYPING SEROLOGIC ABO: CPT

## 2020-03-20 PROCEDURE — 82962 GLUCOSE BLOOD TEST: CPT

## 2020-03-20 PROCEDURE — 2580000003 HC RX 258: Performed by: EMERGENCY MEDICINE

## 2020-03-20 PROCEDURE — 80053 COMPREHEN METABOLIC PANEL: CPT

## 2020-03-20 PROCEDURE — 71045 X-RAY EXAM CHEST 1 VIEW: CPT

## 2020-03-20 PROCEDURE — C1757 CATH, THROMBECTOMY/EMBOLECT: HCPCS | Performed by: SURGERY

## 2020-03-20 PROCEDURE — 0V9500Z DRAINAGE OF SCROTUM WITH DRAINAGE DEVICE, OPEN APPROACH: ICD-10-PCS | Performed by: STUDENT IN AN ORGANIZED HEALTH CARE EDUCATION/TRAINING PROGRAM

## 2020-03-20 PROCEDURE — 82803 BLOOD GASES ANY COMBINATION: CPT

## 2020-03-20 PROCEDURE — 85025 COMPLETE CBC W/AUTO DIFF WBC: CPT

## 2020-03-20 PROCEDURE — P9041 ALBUMIN (HUMAN),5%, 50ML: HCPCS | Performed by: NURSE ANESTHETIST, CERTIFIED REGISTERED

## 2020-03-20 PROCEDURE — 93005 ELECTROCARDIOGRAM TRACING: CPT | Performed by: GENERAL PRACTICE

## 2020-03-20 PROCEDURE — 85610 PROTHROMBIN TIME: CPT

## 2020-03-20 PROCEDURE — 82010 KETONE BODYS QUAN: CPT

## 2020-03-20 PROCEDURE — 94761 N-INVAS EAR/PLS OXIMETRY MLT: CPT

## 2020-03-20 PROCEDURE — 87186 SC STD MICRODIL/AGAR DIL: CPT

## 2020-03-20 PROCEDURE — 87070 CULTURE OTHR SPECIMN AEROBIC: CPT

## 2020-03-20 PROCEDURE — 3700000000 HC ANESTHESIA ATTENDED CARE: Performed by: SURGERY

## 2020-03-20 PROCEDURE — 96375 TX/PRO/DX INJ NEW DRUG ADDON: CPT

## 2020-03-20 PROCEDURE — 36556 INSERT NON-TUNNEL CV CATH: CPT

## 2020-03-20 PROCEDURE — 6360000002 HC RX W HCPCS: Performed by: NURSE ANESTHETIST, CERTIFIED REGISTERED

## 2020-03-20 PROCEDURE — 87075 CULTR BACTERIA EXCEPT BLOOD: CPT

## 2020-03-20 PROCEDURE — 83880 ASSAY OF NATRIURETIC PEPTIDE: CPT

## 2020-03-20 RX ORDER — HEPARIN SODIUM 10000 [USP'U]/100ML
18 INJECTION, SOLUTION INTRAVENOUS CONTINUOUS
Status: DISCONTINUED | OUTPATIENT
Start: 2020-03-20 | End: 2020-03-22

## 2020-03-20 RX ORDER — NITROGLYCERIN 5 MG/ML
INJECTION, SOLUTION INTRAVENOUS PRN
Status: DISCONTINUED | OUTPATIENT
Start: 2020-03-20 | End: 2020-03-21 | Stop reason: SDUPTHER

## 2020-03-20 RX ORDER — SODIUM CHLORIDE 9 MG/ML
INJECTION, SOLUTION INTRAVENOUS CONTINUOUS PRN
Status: DISCONTINUED | OUTPATIENT
Start: 2020-03-20 | End: 2020-03-21 | Stop reason: SDUPTHER

## 2020-03-20 RX ORDER — 0.9 % SODIUM CHLORIDE 0.9 %
1500 INTRAVENOUS SOLUTION INTRAVENOUS ONCE
Status: COMPLETED | OUTPATIENT
Start: 2020-03-20 | End: 2020-03-20

## 2020-03-20 RX ORDER — CISATRACURIUM BESYLATE 2 MG/ML
INJECTION, SOLUTION INTRAVENOUS PRN
Status: DISCONTINUED | OUTPATIENT
Start: 2020-03-20 | End: 2020-03-21 | Stop reason: SDUPTHER

## 2020-03-20 RX ORDER — LIDOCAINE HYDROCHLORIDE 20 MG/ML
INJECTION, SOLUTION INTRAVENOUS PRN
Status: DISCONTINUED | OUTPATIENT
Start: 2020-03-20 | End: 2020-03-21 | Stop reason: SDUPTHER

## 2020-03-20 RX ORDER — MEPERIDINE HYDROCHLORIDE 50 MG/ML
12.5 INJECTION INTRAMUSCULAR; INTRAVENOUS; SUBCUTANEOUS
Status: DISCONTINUED | OUTPATIENT
Start: 2020-03-20 | End: 2020-03-21 | Stop reason: HOSPADM

## 2020-03-20 RX ORDER — FENTANYL CITRATE 50 UG/ML
INJECTION, SOLUTION INTRAMUSCULAR; INTRAVENOUS PRN
Status: DISCONTINUED | OUTPATIENT
Start: 2020-03-20 | End: 2020-03-21 | Stop reason: SDUPTHER

## 2020-03-20 RX ORDER — ONDANSETRON 2 MG/ML
4 INJECTION INTRAMUSCULAR; INTRAVENOUS
Status: ACTIVE | OUTPATIENT
Start: 2020-03-20 | End: 2020-03-20

## 2020-03-20 RX ORDER — ALBUMIN, HUMAN INJ 5% 5 %
SOLUTION INTRAVENOUS PRN
Status: DISCONTINUED | OUTPATIENT
Start: 2020-03-20 | End: 2020-03-21 | Stop reason: SDUPTHER

## 2020-03-20 RX ORDER — MORPHINE SULFATE 2 MG/ML
2 INJECTION, SOLUTION INTRAMUSCULAR; INTRAVENOUS EVERY 5 MIN PRN
Status: DISCONTINUED | OUTPATIENT
Start: 2020-03-20 | End: 2020-03-21 | Stop reason: HOSPADM

## 2020-03-20 RX ORDER — LABETALOL HYDROCHLORIDE 5 MG/ML
INJECTION, SOLUTION INTRAVENOUS PRN
Status: DISCONTINUED | OUTPATIENT
Start: 2020-03-20 | End: 2020-03-21 | Stop reason: SDUPTHER

## 2020-03-20 RX ORDER — GLYCOPYRROLATE 1 MG/5 ML
SYRINGE (ML) INTRAVENOUS PRN
Status: DISCONTINUED | OUTPATIENT
Start: 2020-03-20 | End: 2020-03-21 | Stop reason: SDUPTHER

## 2020-03-20 RX ORDER — SUCCINYLCHOLINE/SOD CL,ISO/PF 200MG/10ML
SYRINGE (ML) INTRAVENOUS PRN
Status: DISCONTINUED | OUTPATIENT
Start: 2020-03-20 | End: 2020-03-21 | Stop reason: SDUPTHER

## 2020-03-20 RX ORDER — OXYCODONE HYDROCHLORIDE AND ACETAMINOPHEN 5; 325 MG/1; MG/1
1 TABLET ORAL PRN
Status: ACTIVE | OUTPATIENT
Start: 2020-03-20 | End: 2020-03-20

## 2020-03-20 RX ORDER — OXYCODONE HYDROCHLORIDE AND ACETAMINOPHEN 5; 325 MG/1; MG/1
2 TABLET ORAL PRN
Status: ACTIVE | OUTPATIENT
Start: 2020-03-20 | End: 2020-03-20

## 2020-03-20 RX ORDER — PROPOFOL 10 MG/ML
INJECTION, EMULSION INTRAVENOUS PRN
Status: DISCONTINUED | OUTPATIENT
Start: 2020-03-20 | End: 2020-03-21 | Stop reason: SDUPTHER

## 2020-03-20 RX ORDER — 0.9 % SODIUM CHLORIDE 0.9 %
1000 INTRAVENOUS SOLUTION INTRAVENOUS ONCE
Status: COMPLETED | OUTPATIENT
Start: 2020-03-20 | End: 2020-03-20

## 2020-03-20 RX ORDER — SODIUM CHLORIDE 9 MG/ML
1000 INJECTION, SOLUTION INTRAVENOUS CONTINUOUS
Status: DISCONTINUED | OUTPATIENT
Start: 2020-03-20 | End: 2020-03-21

## 2020-03-20 RX ORDER — ONDANSETRON 2 MG/ML
INJECTION INTRAMUSCULAR; INTRAVENOUS PRN
Status: DISCONTINUED | OUTPATIENT
Start: 2020-03-20 | End: 2020-03-21 | Stop reason: SDUPTHER

## 2020-03-20 RX ORDER — NEOSTIGMINE METHYLSULFATE 1 MG/ML
INJECTION, SOLUTION INTRAVENOUS PRN
Status: DISCONTINUED | OUTPATIENT
Start: 2020-03-20 | End: 2020-03-21 | Stop reason: SDUPTHER

## 2020-03-20 RX ORDER — MORPHINE SULFATE 2 MG/ML
1 INJECTION, SOLUTION INTRAMUSCULAR; INTRAVENOUS EVERY 5 MIN PRN
Status: DISCONTINUED | OUTPATIENT
Start: 2020-03-20 | End: 2020-03-21 | Stop reason: HOSPADM

## 2020-03-20 RX ORDER — HEPARIN SODIUM 1000 [USP'U]/ML
80 INJECTION, SOLUTION INTRAVENOUS; SUBCUTANEOUS ONCE
Status: COMPLETED | OUTPATIENT
Start: 2020-03-20 | End: 2020-03-20

## 2020-03-20 RX ORDER — SODIUM CHLORIDE 0.9 % (FLUSH) 0.9 %
10 SYRINGE (ML) INJECTION PRN
Status: DISCONTINUED | OUTPATIENT
Start: 2020-03-20 | End: 2020-03-27 | Stop reason: HOSPADM

## 2020-03-20 RX ORDER — HEPARIN SODIUM 1000 [USP'U]/ML
80 INJECTION, SOLUTION INTRAVENOUS; SUBCUTANEOUS PRN
Status: DISCONTINUED | OUTPATIENT
Start: 2020-03-20 | End: 2020-03-22 | Stop reason: ALTCHOICE

## 2020-03-20 RX ORDER — SODIUM CHLORIDE 9 MG/ML
INJECTION, SOLUTION INTRAVENOUS CONTINUOUS
Status: DISCONTINUED | OUTPATIENT
Start: 2020-03-20 | End: 2020-03-20

## 2020-03-20 RX ORDER — HEPARIN SODIUM 1000 [USP'U]/ML
40 INJECTION, SOLUTION INTRAVENOUS; SUBCUTANEOUS PRN
Status: DISCONTINUED | OUTPATIENT
Start: 2020-03-20 | End: 2020-03-22 | Stop reason: ALTCHOICE

## 2020-03-20 RX ADMIN — Medication 10 ML: at 19:28

## 2020-03-20 RX ADMIN — HEPARIN SODIUM 9980 UNITS: 1000 INJECTION, SOLUTION INTRAVENOUS; SUBCUTANEOUS at 20:24

## 2020-03-20 RX ADMIN — SODIUM CHLORIDE: 9 INJECTION, SOLUTION INTRAVENOUS at 23:11

## 2020-03-20 RX ADMIN — SODIUM CHLORIDE 15 UNITS: 9 INJECTION, SOLUTION INTRAVENOUS at 21:45

## 2020-03-20 RX ADMIN — HEPARIN SODIUM 18 UNITS/KG/HR: 10000 INJECTION, SOLUTION INTRAVENOUS at 20:28

## 2020-03-20 RX ADMIN — SODIUM CHLORIDE: 9 INJECTION, SOLUTION INTRAVENOUS at 19:46

## 2020-03-20 RX ADMIN — SODIUM CHLORIDE: 9 INJECTION, SOLUTION INTRAVENOUS at 21:15

## 2020-03-20 RX ADMIN — NITROGLYCERIN 50 MCG: 5 INJECTION, SOLUTION INTRAVENOUS at 21:44

## 2020-03-20 RX ADMIN — LABETALOL HYDROCHLORIDE 5 MG: 5 INJECTION INTRAVENOUS at 23:25

## 2020-03-20 RX ADMIN — Medication 3 MG: at 23:19

## 2020-03-20 RX ADMIN — ALBUMIN (HUMAN) 500 ML: 12.5 INJECTION, SOLUTION INTRAVENOUS at 22:13

## 2020-03-20 RX ADMIN — PIPERACILLIN SODIUM AND TAZOBACTAM SODIUM 4.5 G: 4; .5 INJECTION, POWDER, LYOPHILIZED, FOR SOLUTION INTRAVENOUS at 21:37

## 2020-03-20 RX ADMIN — SODIUM CHLORIDE 1000 ML: 9 INJECTION, SOLUTION INTRAVENOUS at 20:20

## 2020-03-20 RX ADMIN — Medication 140 MG: at 21:23

## 2020-03-20 RX ADMIN — INSULIN HUMAN 10 UNITS: 100 INJECTION, SOLUTION PARENTERAL at 20:21

## 2020-03-20 RX ADMIN — ONDANSETRON HYDROCHLORIDE 4 MG: 2 INJECTION, SOLUTION INTRAMUSCULAR; INTRAVENOUS at 23:19

## 2020-03-20 RX ADMIN — LIDOCAINE HYDROCHLORIDE 100 MG: 20 INJECTION, SOLUTION INTRAVENOUS at 21:23

## 2020-03-20 RX ADMIN — SODIUM CHLORIDE: 9 INJECTION, SOLUTION INTRAVENOUS at 22:17

## 2020-03-20 RX ADMIN — FENTANYL CITRATE 100 MCG: 50 INJECTION, SOLUTION INTRAMUSCULAR; INTRAVENOUS at 21:23

## 2020-03-20 RX ADMIN — VANCOMYCIN HYDROCHLORIDE 2 G: 1 INJECTION, POWDER, LYOPHILIZED, FOR SOLUTION INTRAVENOUS at 21:30

## 2020-03-20 RX ADMIN — PROPOFOL 200 MG: 10 INJECTION, EMULSION INTRAVENOUS at 21:23

## 2020-03-20 RX ADMIN — LABETALOL HYDROCHLORIDE 5 MG: 5 INJECTION INTRAVENOUS at 22:15

## 2020-03-20 RX ADMIN — SODIUM CHLORIDE 1000 ML: 0.9 INJECTION, SOLUTION INTRAVENOUS at 17:20

## 2020-03-20 RX ADMIN — Medication 0.6 MG: at 23:19

## 2020-03-20 RX ADMIN — CISATRACURIUM BESYLATE 20 MG: 2 INJECTION INTRAVENOUS at 21:33

## 2020-03-20 RX ADMIN — IOPAMIDOL 120 ML: 755 INJECTION, SOLUTION INTRAVENOUS at 19:28

## 2020-03-20 RX ADMIN — FENTANYL CITRATE 150 MCG: 50 INJECTION, SOLUTION INTRAMUSCULAR; INTRAVENOUS at 21:43

## 2020-03-20 ASSESSMENT — PAIN DESCRIPTION - DESCRIPTORS: DESCRIPTORS: ACHING

## 2020-03-20 ASSESSMENT — PULMONARY FUNCTION TESTS
PIF_VALUE: 26
PIF_VALUE: 26
PIF_VALUE: 3
PIF_VALUE: 28
PIF_VALUE: 27
PIF_VALUE: 26
PIF_VALUE: 2
PIF_VALUE: 26
PIF_VALUE: 2
PIF_VALUE: 27
PIF_VALUE: 25
PIF_VALUE: 26
PIF_VALUE: 27
PIF_VALUE: 26
PIF_VALUE: 3
PIF_VALUE: 26
PIF_VALUE: 3
PIF_VALUE: 26
PIF_VALUE: 28
PIF_VALUE: 26
PIF_VALUE: 0
PIF_VALUE: 26
PIF_VALUE: 4
PIF_VALUE: 27
PIF_VALUE: 27
PIF_VALUE: 2
PIF_VALUE: 26
PIF_VALUE: 0
PIF_VALUE: 26
PIF_VALUE: 23
PIF_VALUE: 26
PIF_VALUE: 24
PIF_VALUE: 27
PIF_VALUE: 28
PIF_VALUE: 25
PIF_VALUE: 25
PIF_VALUE: 27
PIF_VALUE: 25
PIF_VALUE: 21
PIF_VALUE: 24
PIF_VALUE: 26
PIF_VALUE: 27
PIF_VALUE: 27
PIF_VALUE: 4
PIF_VALUE: 26
PIF_VALUE: 27
PIF_VALUE: 23
PIF_VALUE: 26
PIF_VALUE: 3
PIF_VALUE: 23
PIF_VALUE: 26
PIF_VALUE: 26
PIF_VALUE: 3
PIF_VALUE: 27
PIF_VALUE: 26
PIF_VALUE: 26
PIF_VALUE: 2
PIF_VALUE: 26
PIF_VALUE: 26
PIF_VALUE: 2
PIF_VALUE: 26
PIF_VALUE: 2
PIF_VALUE: 26
PIF_VALUE: 3
PIF_VALUE: 27
PIF_VALUE: 3
PIF_VALUE: 28
PIF_VALUE: 27
PIF_VALUE: 26
PIF_VALUE: 3
PIF_VALUE: 26
PIF_VALUE: 27
PIF_VALUE: 3
PIF_VALUE: 26
PIF_VALUE: 11
PIF_VALUE: 26
PIF_VALUE: 3
PIF_VALUE: 4
PIF_VALUE: 26
PIF_VALUE: 3
PIF_VALUE: 29
PIF_VALUE: 2
PIF_VALUE: 7
PIF_VALUE: 29
PIF_VALUE: 24
PIF_VALUE: 26
PIF_VALUE: 3
PIF_VALUE: 26
PIF_VALUE: 1
PIF_VALUE: 26
PIF_VALUE: 0
PIF_VALUE: 25
PIF_VALUE: 26
PIF_VALUE: 26
PIF_VALUE: 30
PIF_VALUE: 27
PIF_VALUE: 3
PIF_VALUE: 26
PIF_VALUE: 1
PIF_VALUE: 2
PIF_VALUE: 3
PIF_VALUE: 27
PIF_VALUE: 3
PIF_VALUE: 26
PIF_VALUE: 3
PIF_VALUE: 24
PIF_VALUE: 26
PIF_VALUE: 27
PIF_VALUE: 25
PIF_VALUE: 3
PIF_VALUE: 25
PIF_VALUE: 27
PIF_VALUE: 26
PIF_VALUE: 25
PIF_VALUE: 0
PIF_VALUE: 26
PIF_VALUE: 27
PIF_VALUE: 2
PIF_VALUE: 27
PIF_VALUE: 4
PIF_VALUE: 0
PIF_VALUE: 1
PIF_VALUE: 2
PIF_VALUE: 26
PIF_VALUE: 25
PIF_VALUE: 26
PIF_VALUE: 3
PIF_VALUE: 26
PIF_VALUE: 3
PIF_VALUE: 2
PIF_VALUE: 1
PIF_VALUE: 24
PIF_VALUE: 25
PIF_VALUE: 0
PIF_VALUE: 26
PIF_VALUE: 3
PIF_VALUE: 26
PIF_VALUE: 3
PIF_VALUE: 26
PIF_VALUE: 26
PIF_VALUE: 27
PIF_VALUE: 26

## 2020-03-20 ASSESSMENT — ENCOUNTER SYMPTOMS
SHORTNESS OF BREATH: 0
WHEEZING: 0
NAUSEA: 1
ABDOMINAL PAIN: 0
SHORTNESS OF BREATH: 0
VOMITING: 1
DIARRHEA: 0

## 2020-03-20 ASSESSMENT — PAIN DESCRIPTION - FREQUENCY: FREQUENCY: CONTINUOUS

## 2020-03-20 ASSESSMENT — PAIN DESCRIPTION - ORIENTATION: ORIENTATION: RIGHT

## 2020-03-20 ASSESSMENT — PAIN DESCRIPTION - LOCATION: LOCATION: SCROTUM;HIP

## 2020-03-20 ASSESSMENT — LIFESTYLE VARIABLES: SMOKING_STATUS: 1

## 2020-03-20 ASSESSMENT — PAIN DESCRIPTION - PAIN TYPE: TYPE: ACUTE PAIN

## 2020-03-20 ASSESSMENT — PAIN SCALES - GENERAL: PAINLEVEL_OUTOF10: 7

## 2020-03-20 NOTE — ED PROVIDER NOTES
Victor Hugo Andrade is a 81-year-old gentleman coming with right lower extremity, right hip, right testicular pain. Patient has a substantial past medical history including but not limited to peripheral artery disease (status post bypass grafting and subsequent AKA of the right lower extremity), insulin-dependent diabetes, chronic kidney disease. He states that for the last 2 to 3 weeks, the stump of his right sided AKA has been cold, swollen and tender to palpation. He states that he has horrible peripheral artery disease in that leg which is why he needed the AKA previously. In addition to this, for the last 9 days he has had nausea, decreased p.o. tolerance, emesis. During the same period of time he is described swelling, warmth, tenderness to his right testicle. He denies any associated trauma, injury or instrumentation to this region. Denies any sexual activity. He states that he attempted to eat 4 days ago, but regurgitated all the food that he tried to eat. This is the last time that he has attended any sort of nutrition. This morning he woke up with right-sided hip pain in addition to these of the symptoms, and this is what led him to seek further evaluation and treatment in the emergency department. On arrival, he was assessed with history, physical exam, imaging studies, auditory studies, EKG, vital signs. His vital signs are notable for a hypertension of 430 systolic but were otherwise stable and he was afebrile. Review of Systems   Constitutional: Positive for activity change, appetite change and fatigue. Negative for chills and fever. Respiratory: Negative for shortness of breath and wheezing. Cardiovascular: Positive for leg swelling (Right stump). Negative for chest pain. Gastrointestinal: Positive for nausea and vomiting. Negative for abdominal pain and diarrhea. Genitourinary: Positive for scrotal swelling (Right-sided) and testicular pain (Right-sided).  Negative for discharge, genital sores, hematuria and penile pain. Musculoskeletal: Negative for arthralgias. Skin: Negative for rash and wound. Neurological: Negative for dizziness and weakness. Psychiatric/Behavioral: Negative for confusion. Physical Exam  Constitutional:       General: He is not in acute distress. Appearance: He is well-developed. He is not diaphoretic. HENT:      Head: Normocephalic and atraumatic. Mouth/Throat:      Dentition: Abnormal dentition. Eyes:      Pupils: Pupils are equal, round, and reactive to light. Neck:      Musculoskeletal: Normal range of motion. Vascular: No JVD. Trachea: No tracheal deviation. Cardiovascular:      Rate and Rhythm: Regular rhythm. Heart sounds: No murmur. No friction rub. No gallop. Pulmonary:      Effort: Pulmonary effort is normal. No respiratory distress. Breath sounds: Normal breath sounds. No stridor. No wheezing or rales. Chest:      Chest wall: No tenderness. Abdominal:      General: Bowel sounds are normal. There is no distension. Palpations: Abdomen is soft. Tenderness: There is no abdominal tenderness. There is no guarding. Musculoskeletal: Normal range of motion. Comments: Right stump is cold to palpation. Femoral pulse in the right lower extremity is undetectable, easily identifiable in the left lower extremity. Skin:     General: Skin is warm and dry. Findings: Erythema present. Comments: Right testicle is edematous, boggy to palpation, warm, substantially larger than left testicle. Neurological:      General: No focal deficit present. Mental Status: He is alert and oriented to person, place, and time. Cranial Nerves: No cranial nerve deficit. Psychiatric:         Behavior: Behavior normal.          Procedures     MDM  Number of Diagnoses or Management Options  Hyperglycemia:   Scrotal abscess:   Vascular occlusion:   Diagnosis management comments:  The patient's emergency department work-up including history, physical exam, vital signs, laboratory studies, imaging studies and reevaluation after initial treatment are concerning for significant pathology requiring further management and care in the inpatient setting. Specifically, the patient has a complete thrombotic occlusion of his right common femoral artery resulting in gangrenous change to his remaining right femoral stump. This is a surgical emergency that has been brought to the vascular surgeon, who is agreed that the patient needs to be admitted directly to the OR. In addition to this he appears to have cellulitis of his right scrotal area requiring antibiotic coverage in the setting of a profound leukocytosis, and the patient is profoundly hyperglycemic with a blood sugar of greater than 700. This information was relayed to the patient who understood this plan of care and was amenable to the plan. Patient was discussed with the surgical service ( ) who concurred with the decision for emergent operative repair. ED Course as of Mar 20 2049   Fri Mar 20, 2020   1756 Spoke with Dr. Devan Camarena of vascular surgery. Recommendation was for CTA with runoff to assess ischemic stump as well as possible neck fashion, and to start on high-dose heparin    [RK]   2015 No pulses appreciable on Doppler assessment of right femoral space. Easily accessible on left side. [RK]   2016 CT reveals absence of flow starting essentially at the initiation of the left common iliac artery    [RK]      ED Course User Index  [RK] [address] DO        ED Course as of Mar 20 2049   Fri Mar 20, 2020   1756 Spoke with Dr. Devan Camarena of vascular surgery. Recommendation was for CTA with runoff to assess ischemic stump as well as possible neck fashion, and to start on high-dose heparin    [RK]   2015 No pulses appreciable on Doppler assessment of right femoral space. Easily accessible on left side.     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 Past Surgical History:  has a past surgical history that includes rhinoplasty (Right); pr deep dissec foot infec,1 bursa (Right, 5/24/2018); femoral bypass; pr office/outpt visit,procedure only (Right, 8/3/2018); and pr amputate thigh,re-amputatn (Right, 11/1/2018). Social History:  reports that he has been smoking cigarettes. He has a 3.50 pack-year smoking history. He has never used smokeless tobacco. He reports that he does not drink alcohol or use drugs. Family History: family history includes Cancer in his mother and sister; Diabetes in his father; Heart Failure in his father; Hypertension in his father. The patients home medications have been reviewed. Allergies: Patient has no known allergies.     -------------------------------------------------- RESULTS -------------------------------------------------    LABS:  Results for orders placed or performed during the hospital encounter of 03/20/20   CBC Auto Differential   Result Value Ref Range    WBC 22.2 (H) 4.5 - 11.5 E9/L    RBC 5.88 (H) 3.80 - 5.80 E12/L    Hemoglobin 15.0 12.5 - 16.5 g/dL    Hematocrit 46.5 37.0 - 54.0 %    MCV 79.1 (L) 80.0 - 99.9 fL    MCH 25.5 (L) 26.0 - 35.0 pg    MCHC 32.3 32.0 - 34.5 %    RDW Negative mg/dL    Urobilinogen, Urine 0.2 <2.0 E.U./dL    Nitrite, Urine Negative Negative    Leukocyte Esterase, Urine Negative Negative   pH, venous   Result Value Ref Range    pH, Adan 7.39 7.35 - 7.45   APTT   Result Value Ref Range    aPTT 28.0 24.5 - 35.1 sec   Microscopic Urinalysis   Result Value Ref Range    WBC, UA 0-1 0 - 5 /HPF    RBC, UA 0-1 0 - 2 /HPF    Bacteria, UA RARE (A) None Seen /HPF       RADIOLOGY:  CTA ABDOMINAL AORTA W BILAT RUNOFF W CONTRAST   Final Result   There is complete thrombotic occlusion of the origin of the right   common femoral artery with some gas in the proximal superficial   femoral artery in the upper thigh, with gas extending into the   quadriceps muscles of the upper thigh amputation stump. There is a fluid collection in the right hemiscrotal subcutaneous fat,   which is not associated with gangrenous gas or prominent scrotal wall   thickening. Atherosclerotic runoff findings are noted in the left leg, which has   arterial patency to the ankle, primarily in the posterior tibial and   peroneal distributions. ALERT:  THIS IS AN ABNORMAL REPORT-thrombosis of the common femoral   artery with gangrene in the upper thigh musculature. Note: Emergency department physician was contacted telephonically at   the time of this dictation to communicate findings. XR FEMUR RIGHT (MIN 2 VIEWS)   Final Result   1. Normal chest with improved aeration of the lungs. 2. Some gas is identified in the ventral 5 soft tissues along the   proximal femur, suggesting gas in venous structures and along fascial   planes. ALERT:  THIS IS AN ABNORMAL REPORT      XR CHEST PORTABLE   Final Result   1. Normal chest with improved aeration of the lungs. 2. Some gas is identified in the ventral 5 soft tissues along the   proximal femur, suggesting gas in venous structures and along fascial   planes.          ALERT:  THIS IS AN ABNORMAL REPORT          EKG #1: Interpreted by emergency department physician unless otherwise noted. Time:  1732    Rate: 89  Rhythm: Sinus. Interpretation: nonspecific T wave abnormality      ------------------------- NURSING NOTES AND VITALS REVIEWED ---------------------------  Date / Time Roomed:  3/20/2020  4:49 PM  ED Bed Assignment:  24/24    The nursing notes within the ED encounter and vital signs as below have been reviewed. Patient Vitals for the past 24 hrs:   BP Temp Pulse Resp SpO2 Weight   03/20/20 1944 (!) 173/72 -- 88 20 96 % --   03/20/20 1654 (!) 152/91 98.2 °F (36.8 °C) 96 16 97 % 275 lb (124.7 kg)       Oxygen Saturation Interpretation: Normal    ------------------------------------------ PROGRESS NOTES ------------------------------------------  Re-evaluation(s):  Time: 8:43 PM EDT  Patients symptoms show no change  Repeat physical examination is not changed    Counseling:  I have spoken with the patient and discussed todays results, in addition to providing specific details for the plan of care and counseling regarding the diagnosis and prognosis. Their questions are answered at this time and they are agreeable with the plan of admission.    --------------------------------- ADDITIONAL PROVIDER NOTES ---------------------------------  Consultations:  Time: 8:43 PM EDT. Spoke with Dr. Adrian Go. Discussed case. They will take the patient to the OR. Spoke with Juliana of Dr. [facility]. Discussed case. They will admit the patient. This patient's ED course included: a personal history and physicial examination, re-evaluation prior to disposition, multiple bedside re-evaluations, IV medications, cardiac monitoring, continuous pulse oximetry and complex medical decision making and emergency management    This patient has remained hemodynamically stable during their ED course.     Please note that the withdrawal or failure to initiate urgent interventions for this patient would likely result in a life threatening deterioration or permanent disability. Accordingly this patient received 60 minutes of critical care time, excluding separately billable procedures. Diagnosis:  1. Vascular occlusion    2. Hyperglycemia    3. Scrotal abscess        Disposition:  Patient's disposition: Admit to operating room  Patient's condition is serious. Duarte Marquez  PGY-1  8:44 PM EDT         [address] DO  Resident  03/20/20 2045       [address] DO  Resident  03/20/20 2056       Duarte Rangel, DO  Resident  03/20/20 2103

## 2020-03-20 NOTE — ED NOTES
Radiology Procedure Waiver   Name: Freddy Mccall  : 1955  MRN: [id number]    Date:  3/20/20    Time: 5:55 PM EDT    Benefits of immediately proceeding with radiology exam(s) without pre-testing outweigh the risks or are not indicated as specified below and therefore the following is/are being waived:    [x] Benefits of immediate radiology exam(s) outweigh any risk. OR    Pre-exam testing is not indicated for the following reason(s):  [] Pregnancy test   [] Patients LMP on-time and regular.   [] Patient had Tubal Ligation or has other Contraception Device. [] Patient  is Menopausal or Premenarcheal.    [] Patient had Full or Partial Hysterectomy. [] Protocol for CT contrast allegry   [] Patient has tolerated well previously   [] Patient does not have a true allergy    [] MRI Questionnaire     [] BUN/Creatinine   [] Patient age w/no hx of renal dysfunction. [] Patient on Dialysis. [] Recent Normal Labs. Electronically signed by Adonis Goldstein DO on 3/20/20 at 5:55 PM EDT        Duarte Sung  PGY-1  5:55 PM EDT       Adonis Goldstein DO  Resident  [phone]

## 2020-03-21 VITALS — DIASTOLIC BLOOD PRESSURE: 57 MMHG | TEMPERATURE: 98.1 F | OXYGEN SATURATION: 99 % | SYSTOLIC BLOOD PRESSURE: 104 MMHG

## 2020-03-21 LAB
ABO/RH: NORMAL
ALBUMIN SERPL-MCNC: 2.9 G/DL (ref 3.5–5.2)
ALBUMIN SERPL-MCNC: 3.1 G/DL (ref 3.5–5.2)
ALP BLD-CCNC: 123 U/L (ref 40–129)
ALP BLD-CCNC: 127 U/L (ref 40–129)
ALT SERPL-CCNC: 24 U/L (ref 0–40)
ALT SERPL-CCNC: 24 U/L (ref 0–40)
ANION GAP SERPL CALCULATED.3IONS-SCNC: 13 MMOL/L (ref 7–16)
ANION GAP SERPL CALCULATED.3IONS-SCNC: 14 MMOL/L (ref 7–16)
ANION GAP SERPL CALCULATED.3IONS-SCNC: 15 MMOL/L (ref 7–16)
ANTIBODY SCREEN: NORMAL
APTT: 149.8 SEC (ref 24.5–35.1)
APTT: 161.3 SEC (ref 24.5–35.1)
APTT: 64.3 SEC (ref 24.5–35.1)
AST SERPL-CCNC: 40 U/L (ref 0–39)
AST SERPL-CCNC: 56 U/L (ref 0–39)
BILIRUB SERPL-MCNC: 0.5 MG/DL (ref 0–1.2)
BILIRUB SERPL-MCNC: 0.6 MG/DL (ref 0–1.2)
BUN BLDV-MCNC: 25 MG/DL (ref 8–23)
BUN BLDV-MCNC: 27 MG/DL (ref 8–23)
BUN BLDV-MCNC: 29 MG/DL (ref 8–23)
CALCIUM IONIZED: 1.21 MMOL/L (ref 1.15–1.33)
CALCIUM IONIZED: 1.24 MMOL/L (ref 1.15–1.33)
CALCIUM SERPL-MCNC: 8.4 MG/DL (ref 8.6–10.2)
CALCIUM SERPL-MCNC: 8.5 MG/DL (ref 8.6–10.2)
CALCIUM SERPL-MCNC: 8.5 MG/DL (ref 8.6–10.2)
CHLORIDE BLD-SCNC: 103 MMOL/L (ref 98–107)
CHLORIDE BLD-SCNC: 105 MMOL/L (ref 98–107)
CHLORIDE BLD-SCNC: 106 MMOL/L (ref 98–107)
CO2: 21 MMOL/L (ref 22–29)
CO2: 22 MMOL/L (ref 22–29)
CO2: 22 MMOL/L (ref 22–29)
CREAT SERPL-MCNC: 1.6 MG/DL (ref 0.7–1.2)
CREAT SERPL-MCNC: 1.7 MG/DL (ref 0.7–1.2)
CREAT SERPL-MCNC: 1.7 MG/DL (ref 0.7–1.2)
EKG ATRIAL RATE: 88 BPM
EKG P AXIS: 48 DEGREES
EKG P-R INTERVAL: 200 MS
EKG Q-T INTERVAL: 394 MS
EKG QRS DURATION: 90 MS
EKG QTC CALCULATION (BAZETT): 476 MS
EKG R AXIS: 1 DEGREES
EKG T AXIS: 36 DEGREES
EKG VENTRICULAR RATE: 88 BPM
GFR AFRICAN AMERICAN: 50
GFR AFRICAN AMERICAN: 50
GFR AFRICAN AMERICAN: 53
GFR NON-AFRICAN AMERICAN: 50 ML/MIN/1.73
GFR NON-AFRICAN AMERICAN: 50 ML/MIN/1.73
GFR NON-AFRICAN AMERICAN: 53 ML/MIN/1.73
GLUCOSE BLD-MCNC: 236 MG/DL (ref 74–99)
GLUCOSE BLD-MCNC: 262 MG/DL (ref 74–99)
GLUCOSE BLD-MCNC: 388 MG/DL (ref 74–99)
HBA1C MFR BLD: 11.3 % (ref 4–5.6)
HCT VFR BLD CALC: 39.5 % (ref 37–54)
HCT VFR BLD CALC: 41 % (ref 37–54)
HEMOGLOBIN: 12.7 G/DL (ref 12.5–16.5)
HEMOGLOBIN: 13.5 G/DL (ref 12.5–16.5)
LACTIC ACID: 2.1 MMOL/L (ref 0.5–2.2)
MAGNESIUM: 2.1 MG/DL (ref 1.6–2.6)
MAGNESIUM: 2.1 MG/DL (ref 1.6–2.6)
MCH RBC QN AUTO: 25.6 PG (ref 26–35)
MCH RBC QN AUTO: 26.6 PG (ref 26–35)
MCHC RBC AUTO-ENTMCNC: 32.2 % (ref 32–34.5)
MCHC RBC AUTO-ENTMCNC: 32.9 % (ref 32–34.5)
MCV RBC AUTO: 79.5 FL (ref 80–99.9)
MCV RBC AUTO: 80.7 FL (ref 80–99.9)
METER GLUCOSE: 167 MG/DL (ref 74–99)
METER GLUCOSE: 170 MG/DL (ref 74–99)
METER GLUCOSE: 193 MG/DL (ref 74–99)
METER GLUCOSE: 222 MG/DL (ref 74–99)
METER GLUCOSE: 236 MG/DL (ref 74–99)
METER GLUCOSE: 238 MG/DL (ref 74–99)
METER GLUCOSE: 249 MG/DL (ref 74–99)
METER GLUCOSE: 264 MG/DL (ref 74–99)
METER GLUCOSE: 265 MG/DL (ref 74–99)
METER GLUCOSE: 272 MG/DL (ref 74–99)
METER GLUCOSE: 278 MG/DL (ref 74–99)
METER GLUCOSE: 321 MG/DL (ref 74–99)
METER GLUCOSE: 337 MG/DL (ref 74–99)
METER GLUCOSE: 349 MG/DL (ref 74–99)
METER GLUCOSE: >500 MG/DL (ref 74–99)
PDW BLD-RTO: 14.1 FL (ref 11.5–15)
PDW BLD-RTO: 14.1 FL (ref 11.5–15)
PHOSPHORUS: 2.6 MG/DL (ref 2.5–4.5)
PHOSPHORUS: 2.8 MG/DL (ref 2.5–4.5)
PLATELET # BLD: 173 E9/L (ref 130–450)
PLATELET # BLD: 179 E9/L (ref 130–450)
PMV BLD AUTO: 13.3 FL (ref 7–12)
PMV BLD AUTO: ABNORMAL FL (ref 7–12)
POTASSIUM SERPL-SCNC: 4 MMOL/L (ref 3.5–5)
POTASSIUM SERPL-SCNC: 4.2 MMOL/L (ref 3.5–5)
POTASSIUM SERPL-SCNC: 4.6 MMOL/L (ref 3.5–5)
RBC # BLD: 4.97 E12/L (ref 3.8–5.8)
RBC # BLD: 5.08 E12/L (ref 3.8–5.8)
SODIUM BLD-SCNC: 140 MMOL/L (ref 132–146)
SODIUM BLD-SCNC: 140 MMOL/L (ref 132–146)
SODIUM BLD-SCNC: 141 MMOL/L (ref 132–146)
TOTAL PROTEIN: 6.9 G/DL (ref 6.4–8.3)
TOTAL PROTEIN: 6.9 G/DL (ref 6.4–8.3)
VANCOMYCIN TROUGH: 12.5 MCG/ML (ref 5–16)
WBC # BLD: 21.7 E9/L (ref 4.5–11.5)
WBC # BLD: 22.4 E9/L (ref 4.5–11.5)

## 2020-03-21 PROCEDURE — 94660 CPAP INITIATION&MGMT: CPT

## 2020-03-21 PROCEDURE — 6370000000 HC RX 637 (ALT 250 FOR IP): Performed by: PHYSICIAN ASSISTANT

## 2020-03-21 PROCEDURE — 6360000002 HC RX W HCPCS: Performed by: STUDENT IN AN ORGANIZED HEALTH CARE EDUCATION/TRAINING PROGRAM

## 2020-03-21 PROCEDURE — 84100 ASSAY OF PHOSPHORUS: CPT

## 2020-03-21 PROCEDURE — 2580000003 HC RX 258: Performed by: STUDENT IN AN ORGANIZED HEALTH CARE EDUCATION/TRAINING PROGRAM

## 2020-03-21 PROCEDURE — 85027 COMPLETE CBC AUTOMATED: CPT

## 2020-03-21 PROCEDURE — 82330 ASSAY OF CALCIUM: CPT

## 2020-03-21 PROCEDURE — 80048 BASIC METABOLIC PNL TOTAL CA: CPT

## 2020-03-21 PROCEDURE — 34201 REMOVAL OF ARTERY CLOT: CPT | Performed by: SURGERY

## 2020-03-21 PROCEDURE — 6370000000 HC RX 637 (ALT 250 FOR IP): Performed by: STUDENT IN AN ORGANIZED HEALTH CARE EDUCATION/TRAINING PROGRAM

## 2020-03-21 PROCEDURE — 2700000000 HC OXYGEN THERAPY PER DAY

## 2020-03-21 PROCEDURE — 2500000003 HC RX 250 WO HCPCS: Performed by: STUDENT IN AN ORGANIZED HEALTH CARE EDUCATION/TRAINING PROGRAM

## 2020-03-21 PROCEDURE — 80202 ASSAY OF VANCOMYCIN: CPT

## 2020-03-21 PROCEDURE — 2000000000 HC ICU R&B

## 2020-03-21 PROCEDURE — 82962 GLUCOSE BLOOD TEST: CPT

## 2020-03-21 PROCEDURE — 85730 THROMBOPLASTIN TIME PARTIAL: CPT

## 2020-03-21 PROCEDURE — 83735 ASSAY OF MAGNESIUM: CPT

## 2020-03-21 PROCEDURE — 36415 COLL VENOUS BLD VENIPUNCTURE: CPT

## 2020-03-21 PROCEDURE — 80053 COMPREHEN METABOLIC PANEL: CPT

## 2020-03-21 PROCEDURE — 83036 HEMOGLOBIN GLYCOSYLATED A1C: CPT

## 2020-03-21 PROCEDURE — 2580000003 HC RX 258: Performed by: PHYSICIAN ASSISTANT

## 2020-03-21 PROCEDURE — 6360000002 HC RX W HCPCS: Performed by: PHYSICIAN ASSISTANT

## 2020-03-21 PROCEDURE — 93010 ELECTROCARDIOGRAM REPORT: CPT | Performed by: INTERNAL MEDICINE

## 2020-03-21 PROCEDURE — 83605 ASSAY OF LACTIC ACID: CPT

## 2020-03-21 PROCEDURE — 54700 I&D EPDIDYMS TSTIS&/SCROT SP: CPT | Performed by: SURGERY

## 2020-03-21 PROCEDURE — 99291 CRITICAL CARE FIRST HOUR: CPT | Performed by: SURGERY

## 2020-03-21 RX ORDER — NICOTINE POLACRILEX 4 MG
15 LOZENGE BUCCAL PRN
Status: DISCONTINUED | OUTPATIENT
Start: 2020-03-21 | End: 2020-03-21 | Stop reason: SDUPTHER

## 2020-03-21 RX ORDER — SODIUM CHLORIDE 0.9 % (FLUSH) 0.9 %
10 SYRINGE (ML) INJECTION EVERY 12 HOURS SCHEDULED
Status: DISCONTINUED | OUTPATIENT
Start: 2020-03-21 | End: 2020-03-27 | Stop reason: HOSPADM

## 2020-03-21 RX ORDER — DEXTROSE MONOHYDRATE 50 MG/ML
100 INJECTION, SOLUTION INTRAVENOUS PRN
Status: DISCONTINUED | OUTPATIENT
Start: 2020-03-21 | End: 2020-03-21 | Stop reason: SDUPTHER

## 2020-03-21 RX ORDER — SODIUM CHLORIDE 9 MG/ML
INJECTION, SOLUTION INTRAVENOUS EVERY 8 HOURS
Status: DISCONTINUED | OUTPATIENT
Start: 2020-03-21 | End: 2020-03-23 | Stop reason: ALTCHOICE

## 2020-03-21 RX ORDER — LABETALOL HYDROCHLORIDE 5 MG/ML
10 INJECTION, SOLUTION INTRAVENOUS EVERY 30 MIN PRN
Status: DISCONTINUED | OUTPATIENT
Start: 2020-03-21 | End: 2020-03-27 | Stop reason: HOSPADM

## 2020-03-21 RX ORDER — CILOSTAZOL 100 MG/1
100 TABLET ORAL 2 TIMES DAILY
Status: DISCONTINUED | OUTPATIENT
Start: 2020-03-21 | End: 2020-03-27 | Stop reason: HOSPADM

## 2020-03-21 RX ORDER — AMLODIPINE BESYLATE 10 MG/1
10 TABLET ORAL DAILY
Status: DISCONTINUED | OUTPATIENT
Start: 2020-03-21 | End: 2020-03-27 | Stop reason: HOSPADM

## 2020-03-21 RX ORDER — POTASSIUM CHLORIDE 20 MEQ/1
40 TABLET, EXTENDED RELEASE ORAL PRN
Status: DISCONTINUED | OUTPATIENT
Start: 2020-03-21 | End: 2020-03-27 | Stop reason: HOSPADM

## 2020-03-21 RX ORDER — OXYCODONE HYDROCHLORIDE AND ACETAMINOPHEN 5; 325 MG/1; MG/1
1 TABLET ORAL EVERY 4 HOURS PRN
Status: DISCONTINUED | OUTPATIENT
Start: 2020-03-21 | End: 2020-03-27 | Stop reason: HOSPADM

## 2020-03-21 RX ORDER — PROMETHAZINE HYDROCHLORIDE 25 MG/1
12.5 TABLET ORAL EVERY 6 HOURS PRN
Status: DISCONTINUED | OUTPATIENT
Start: 2020-03-21 | End: 2020-03-27 | Stop reason: HOSPADM

## 2020-03-21 RX ORDER — ACETAMINOPHEN 325 MG/1
650 TABLET ORAL EVERY 6 HOURS PRN
Status: DISCONTINUED | OUTPATIENT
Start: 2020-03-21 | End: 2020-03-27 | Stop reason: HOSPADM

## 2020-03-21 RX ORDER — METOPROLOL TARTRATE 50 MG/1
100 TABLET, FILM COATED ORAL 2 TIMES DAILY
Status: DISCONTINUED | OUTPATIENT
Start: 2020-03-21 | End: 2020-03-27 | Stop reason: HOSPADM

## 2020-03-21 RX ORDER — ONDANSETRON 2 MG/ML
4 INJECTION INTRAMUSCULAR; INTRAVENOUS EVERY 6 HOURS PRN
Status: DISCONTINUED | OUTPATIENT
Start: 2020-03-21 | End: 2020-03-27 | Stop reason: HOSPADM

## 2020-03-21 RX ORDER — PANTOPRAZOLE SODIUM 40 MG/1
40 TABLET, DELAYED RELEASE ORAL
Status: DISCONTINUED | OUTPATIENT
Start: 2020-03-21 | End: 2020-03-27 | Stop reason: HOSPADM

## 2020-03-21 RX ORDER — OXYCODONE HYDROCHLORIDE AND ACETAMINOPHEN 5; 325 MG/1; MG/1
2 TABLET ORAL EVERY 4 HOURS PRN
Status: DISCONTINUED | OUTPATIENT
Start: 2020-03-21 | End: 2020-03-27 | Stop reason: HOSPADM

## 2020-03-21 RX ORDER — ASPIRIN 81 MG/1
81 TABLET, CHEWABLE ORAL DAILY
Status: DISCONTINUED | OUTPATIENT
Start: 2020-03-21 | End: 2020-03-27 | Stop reason: HOSPADM

## 2020-03-21 RX ORDER — LUBIPROSTONE 24 UG/1
24 CAPSULE, GELATIN COATED ORAL 2 TIMES DAILY WITH MEALS
Status: DISCONTINUED | OUTPATIENT
Start: 2020-03-21 | End: 2020-03-27 | Stop reason: HOSPADM

## 2020-03-21 RX ORDER — DEXTROSE MONOHYDRATE 50 MG/ML
100 INJECTION, SOLUTION INTRAVENOUS PRN
Status: DISCONTINUED | OUTPATIENT
Start: 2020-03-21 | End: 2020-03-27 | Stop reason: HOSPADM

## 2020-03-21 RX ORDER — DEXTROSE MONOHYDRATE 25 G/50ML
12.5 INJECTION, SOLUTION INTRAVENOUS PRN
Status: DISCONTINUED | OUTPATIENT
Start: 2020-03-21 | End: 2020-03-21 | Stop reason: SDUPTHER

## 2020-03-21 RX ORDER — DULOXETIN HYDROCHLORIDE 60 MG/1
120 CAPSULE, DELAYED RELEASE ORAL EVERY MORNING
Status: DISCONTINUED | OUTPATIENT
Start: 2020-03-21 | End: 2020-03-27 | Stop reason: HOSPADM

## 2020-03-21 RX ORDER — SODIUM CHLORIDE 9 MG/ML
INJECTION, SOLUTION INTRAVENOUS CONTINUOUS
Status: DISCONTINUED | OUTPATIENT
Start: 2020-03-21 | End: 2020-03-22

## 2020-03-21 RX ORDER — LISINOPRIL 10 MG/1
10 TABLET ORAL DAILY
Status: DISCONTINUED | OUTPATIENT
Start: 2020-03-21 | End: 2020-03-27 | Stop reason: HOSPADM

## 2020-03-21 RX ORDER — NICOTINE POLACRILEX 4 MG
15 LOZENGE BUCCAL PRN
Status: DISCONTINUED | OUTPATIENT
Start: 2020-03-21 | End: 2020-03-27 | Stop reason: HOSPADM

## 2020-03-21 RX ORDER — POTASSIUM CHLORIDE 7.45 MG/ML
10 INJECTION INTRAVENOUS PRN
Status: DISCONTINUED | OUTPATIENT
Start: 2020-03-21 | End: 2020-03-27 | Stop reason: HOSPADM

## 2020-03-21 RX ORDER — SPIRONOLACTONE 25 MG/1
50 TABLET ORAL EVERY MORNING
Status: DISCONTINUED | OUTPATIENT
Start: 2020-03-21 | End: 2020-03-27 | Stop reason: HOSPADM

## 2020-03-21 RX ORDER — INSULIN GLARGINE 100 [IU]/ML
15 INJECTION, SOLUTION SUBCUTANEOUS 2 TIMES DAILY
Status: DISCONTINUED | OUTPATIENT
Start: 2020-03-21 | End: 2020-03-22

## 2020-03-21 RX ORDER — CLONIDINE HYDROCHLORIDE 0.1 MG/1
0.1 TABLET ORAL 3 TIMES DAILY
Status: DISCONTINUED | OUTPATIENT
Start: 2020-03-21 | End: 2020-03-27 | Stop reason: HOSPADM

## 2020-03-21 RX ORDER — HYDRALAZINE HYDROCHLORIDE 20 MG/ML
10 INJECTION INTRAMUSCULAR; INTRAVENOUS EVERY 30 MIN PRN
Status: DISCONTINUED | OUTPATIENT
Start: 2020-03-21 | End: 2020-03-27 | Stop reason: HOSPADM

## 2020-03-21 RX ORDER — SODIUM CHLORIDE 0.9 % (FLUSH) 0.9 %
10 SYRINGE (ML) INJECTION PRN
Status: DISCONTINUED | OUTPATIENT
Start: 2020-03-21 | End: 2020-03-27 | Stop reason: HOSPADM

## 2020-03-21 RX ORDER — DEXTROSE MONOHYDRATE 25 G/50ML
12.5 INJECTION, SOLUTION INTRAVENOUS PRN
Status: DISCONTINUED | OUTPATIENT
Start: 2020-03-21 | End: 2020-03-27 | Stop reason: HOSPADM

## 2020-03-21 RX ORDER — ATORVASTATIN CALCIUM 10 MG/1
10 TABLET, FILM COATED ORAL DAILY
Status: DISCONTINUED | OUTPATIENT
Start: 2020-03-21 | End: 2020-03-27 | Stop reason: HOSPADM

## 2020-03-21 RX ORDER — PREGABALIN 100 MG/1
200 CAPSULE ORAL 3 TIMES DAILY
Status: DISCONTINUED | OUTPATIENT
Start: 2020-03-21 | End: 2020-03-27 | Stop reason: HOSPADM

## 2020-03-21 RX ORDER — ACETAMINOPHEN 650 MG/1
650 SUPPOSITORY RECTAL EVERY 6 HOURS PRN
Status: DISCONTINUED | OUTPATIENT
Start: 2020-03-21 | End: 2020-03-27 | Stop reason: HOSPADM

## 2020-03-21 RX ADMIN — ATORVASTATIN CALCIUM 10 MG: 10 TABLET, FILM COATED ORAL at 08:42

## 2020-03-21 RX ADMIN — PIPERACILLIN AND TAZOBACTAM 3.38 G: 3; .375 INJECTION, POWDER, LYOPHILIZED, FOR SOLUTION INTRAVENOUS at 22:51

## 2020-03-21 RX ADMIN — PIPERACILLIN AND TAZOBACTAM 3.38 G: 3; .375 INJECTION, POWDER, LYOPHILIZED, FOR SOLUTION INTRAVENOUS at 14:53

## 2020-03-21 RX ADMIN — SODIUM CHLORIDE: 9 INJECTION, SOLUTION INTRAVENOUS at 06:58

## 2020-03-21 RX ADMIN — SODIUM CHLORIDE 14.6 UNITS/HR: 9 INJECTION, SOLUTION INTRAVENOUS at 21:25

## 2020-03-21 RX ADMIN — LUBIPROSTONE 24 MCG: 24 CAPSULE, GELATIN COATED ORAL at 16:22

## 2020-03-21 RX ADMIN — HYDRALAZINE HYDROCHLORIDE 10 MG: 20 INJECTION INTRAMUSCULAR; INTRAVENOUS at 09:02

## 2020-03-21 RX ADMIN — HEPARIN SODIUM 15 UNITS/KG/HR: 10000 INJECTION, SOLUTION INTRAVENOUS at 09:51

## 2020-03-21 RX ADMIN — PREGABALIN 200 MG: 100 CAPSULE ORAL at 21:20

## 2020-03-21 RX ADMIN — HYDROMORPHONE HYDROCHLORIDE 1 MG: 1 INJECTION, SOLUTION INTRAMUSCULAR; INTRAVENOUS; SUBCUTANEOUS at 06:59

## 2020-03-21 RX ADMIN — HEPARIN SODIUM 15 UNITS/KG/HR: 10000 INJECTION, SOLUTION INTRAVENOUS at 21:26

## 2020-03-21 RX ADMIN — HYDRALAZINE HYDROCHLORIDE 10 MG: 20 INJECTION INTRAMUSCULAR; INTRAVENOUS at 02:05

## 2020-03-21 RX ADMIN — LUBIPROSTONE 24 MCG: 24 CAPSULE, GELATIN COATED ORAL at 08:39

## 2020-03-21 RX ADMIN — INSULIN GLARGINE 15 UNITS: 100 INJECTION, SOLUTION SUBCUTANEOUS at 01:36

## 2020-03-21 RX ADMIN — DULOXETINE HYDROCHLORIDE 120 MG: 60 CAPSULE, DELAYED RELEASE ORAL at 08:42

## 2020-03-21 RX ADMIN — CILOSTAZOL 100 MG: 100 TABLET ORAL at 08:40

## 2020-03-21 RX ADMIN — PREGABALIN 200 MG: 100 CAPSULE ORAL at 08:40

## 2020-03-21 RX ADMIN — SPIRONOLACTONE 50 MG: 25 TABLET ORAL at 08:42

## 2020-03-21 RX ADMIN — SODIUM CHLORIDE 1000 ML: 9 INJECTION, SOLUTION INTRAVENOUS at 00:52

## 2020-03-21 RX ADMIN — CLONIDINE HYDROCHLORIDE 0.1 MG: 0.1 TABLET ORAL at 15:28

## 2020-03-21 RX ADMIN — PANTOPRAZOLE SODIUM 40 MG: 40 TABLET, DELAYED RELEASE ORAL at 11:23

## 2020-03-21 RX ADMIN — CLONIDINE HYDROCHLORIDE 0.1 MG: 0.1 TABLET ORAL at 21:20

## 2020-03-21 RX ADMIN — LABETALOL HYDROCHLORIDE 10 MG: 5 INJECTION INTRAVENOUS at 08:57

## 2020-03-21 RX ADMIN — PREGABALIN 200 MG: 100 CAPSULE ORAL at 15:22

## 2020-03-21 RX ADMIN — INSULIN LISPRO 12 UNITS: 100 INJECTION, SOLUTION INTRAVENOUS; SUBCUTANEOUS at 09:44

## 2020-03-21 RX ADMIN — INSULIN GLARGINE 15 UNITS: 100 INJECTION, SOLUTION SUBCUTANEOUS at 21:19

## 2020-03-21 RX ADMIN — AMLODIPINE BESYLATE 10 MG: 10 TABLET ORAL at 08:40

## 2020-03-21 RX ADMIN — PIPERACILLIN AND TAZOBACTAM 3.38 G: 3; .375 INJECTION, POWDER, LYOPHILIZED, FOR SOLUTION INTRAVENOUS at 05:12

## 2020-03-21 RX ADMIN — Medication 10 ML: at 21:21

## 2020-03-21 RX ADMIN — CILOSTAZOL 100 MG: 100 TABLET ORAL at 21:20

## 2020-03-21 RX ADMIN — INSULIN LISPRO 12 UNITS: 100 INJECTION, SOLUTION INTRAVENOUS; SUBCUTANEOUS at 05:48

## 2020-03-21 RX ADMIN — HEPARIN SODIUM 15 UNITS/KG/HR: 10000 INJECTION, SOLUTION INTRAVENOUS at 03:30

## 2020-03-21 RX ADMIN — VANCOMYCIN HYDROCHLORIDE 1250 MG: 10 INJECTION, POWDER, LYOPHILIZED, FOR SOLUTION INTRAVENOUS at 21:23

## 2020-03-21 RX ADMIN — INSULIN GLARGINE 15 UNITS: 100 INJECTION, SOLUTION SUBCUTANEOUS at 09:44

## 2020-03-21 RX ADMIN — HYDRALAZINE HYDROCHLORIDE 10 MG: 20 INJECTION INTRAMUSCULAR; INTRAVENOUS at 01:19

## 2020-03-21 RX ADMIN — ASPIRIN 81 MG CHEWABLE TABLET 81 MG: 81 TABLET CHEWABLE at 08:40

## 2020-03-21 RX ADMIN — CLONIDINE HYDROCHLORIDE 0.1 MG: 0.1 TABLET ORAL at 08:40

## 2020-03-21 RX ADMIN — VANCOMYCIN HYDROCHLORIDE 1250 MG: 10 INJECTION, POWDER, LYOPHILIZED, FOR SOLUTION INTRAVENOUS at 11:23

## 2020-03-21 RX ADMIN — HYDROMORPHONE HYDROCHLORIDE 1 MG: 1 INJECTION, SOLUTION INTRAMUSCULAR; INTRAVENOUS; SUBCUTANEOUS at 02:31

## 2020-03-21 RX ADMIN — LISINOPRIL 10 MG: 10 TABLET ORAL at 08:40

## 2020-03-21 RX ADMIN — METOPROLOL TARTRATE 100 MG: 50 TABLET, FILM COATED ORAL at 08:40

## 2020-03-21 RX ADMIN — OXYCODONE AND ACETAMINOPHEN 2 TABLET: 5; 325 TABLET ORAL at 14:29

## 2020-03-21 RX ADMIN — METOPROLOL TARTRATE 100 MG: 50 TABLET, FILM COATED ORAL at 21:20

## 2020-03-21 RX ADMIN — HYDROMORPHONE HYDROCHLORIDE 1 MG: 1 INJECTION, SOLUTION INTRAMUSCULAR; INTRAVENOUS; SUBCUTANEOUS at 20:04

## 2020-03-21 RX ADMIN — LABETALOL HYDROCHLORIDE 10 MG: 5 INJECTION INTRAVENOUS at 03:45

## 2020-03-21 RX ADMIN — SODIUM CHLORIDE 6.4 UNITS/HR: 9 INJECTION, SOLUTION INTRAVENOUS at 12:49

## 2020-03-21 RX ADMIN — Medication 10 ML: at 08:42

## 2020-03-21 RX ADMIN — HYDRALAZINE HYDROCHLORIDE 10 MG: 20 INJECTION INTRAMUSCULAR; INTRAVENOUS at 02:58

## 2020-03-21 RX ADMIN — INSULIN LISPRO 2 UNITS: 100 INJECTION, SOLUTION INTRAVENOUS; SUBCUTANEOUS at 01:06

## 2020-03-21 ASSESSMENT — PAIN DESCRIPTION - FREQUENCY
FREQUENCY: INTERMITTENT
FREQUENCY: CONTINUOUS

## 2020-03-21 ASSESSMENT — PAIN SCALES - GENERAL
PAINLEVEL_OUTOF10: 8
PAINLEVEL_OUTOF10: 0
PAINLEVEL_OUTOF10: 8
PAINLEVEL_OUTOF10: 7
PAINLEVEL_OUTOF10: 0
PAINLEVEL_OUTOF10: 9
PAINLEVEL_OUTOF10: 0

## 2020-03-21 ASSESSMENT — PAIN DESCRIPTION - PAIN TYPE
TYPE: ACUTE PAIN
TYPE: SURGICAL PAIN
TYPE: SURGICAL PAIN

## 2020-03-21 ASSESSMENT — PAIN DESCRIPTION - ORIENTATION: ORIENTATION: RIGHT

## 2020-03-21 ASSESSMENT — PAIN DESCRIPTION - LOCATION
LOCATION: SCROTUM
LOCATION: GROIN
LOCATION: SCROTUM

## 2020-03-21 ASSESSMENT — PAIN DESCRIPTION - DESCRIPTORS
DESCRIPTORS: ACHING;CRAMPING
DESCRIPTORS: ACHING
DESCRIPTORS: ACHING

## 2020-03-21 ASSESSMENT — PAIN DESCRIPTION - PROGRESSION
CLINICAL_PROGRESSION: GRADUALLY WORSENING
CLINICAL_PROGRESSION: RAPIDLY WORSENING

## 2020-03-21 ASSESSMENT — PULMONARY FUNCTION TESTS: PIF_VALUE: 2

## 2020-03-21 ASSESSMENT — PAIN - FUNCTIONAL ASSESSMENT: PAIN_FUNCTIONAL_ASSESSMENT: ACTIVITIES ARE NOT PREVENTED

## 2020-03-21 ASSESSMENT — PAIN DESCRIPTION - ONSET: ONSET: GRADUAL

## 2020-03-21 NOTE — ANESTHESIA PRE PROCEDURE
10/21/19   Juanito Kline,    pregabalin (LYRICA) 300 MG capsule Take 1 capsule by mouth 2 times daily for 30 days. 10/11/19 11/10/19  Juanito Kline, DO   naloxone 4 MG/0.1ML LIQD nasal spray 1 spray by Nasal route as needed for Opioid Reversal 9/10/19   Juanito Kline, DO   naloxone 4 MG/0.1ML LIQD nasal spray 1 spray by Nasal route as needed for Opioid Reversal  Patient not taking: Reported on 10/21/2019 7/12/19   uJanito Kline, DO   LANCETS MICRO THIN 86D MISC 1 applicator by Does not apply route daily 6/14/19   Juanito Kline, DO   Insulin Syringe-Needle U-100 30G X 5/16\" 0.3 ML MISC 1 box by Does not apply route 5 times daily 5/30/19   Juanito Kline, DO   ferrous sulfate 325 (65 Fe) MG tablet Take 1 tablet by mouth 2 times daily (with meals)  Patient not taking: Reported on 10/21/2019 11/14/18   Toney Waddell MD   lidocaine (LIDODERM) 5 % Place 1 patch onto the skin daily 12 hours on, 12 hours off. 11/14/18   Toney Waddell MD   aspirin 81 MG chewable tablet Take 1 tablet by mouth daily 8/8/18   Devang Green MD   lisinopril (PRINIVIL;ZESTRIL) 10 MG tablet Take 1 tablet by mouth daily Hold if sbp<140 8/7/18   Devang Green MD   cilostazol (PLETAL) 100 MG tablet Take 100 mg by mouth 2 times daily    Historical Provider, MD   tiZANidine (ZANAFLEX) 4 MG tablet Take 4 mg by mouth every 8 hours as needed     Historical Provider, MD   pregabalin (LYRICA) 100 MG capsule Take 200 mg by mouth 3 times daily. Jorge Search     Historical Provider, MD   spironolactone (ALDACTONE) 50 MG tablet Take 50 mg by mouth every morning     Historical Provider, MD   atorvastatin (LIPITOR) 10 MG tablet Take 10 mg by mouth daily     Historical Provider, MD   esomeprazole (NEXIUM) 40 MG delayed release capsule Take 40 mg by mouth every morning (before breakfast)    Historical Provider, MD       Current medications:    Current Facility-Administered Medications   Medication Dose Route Frequency Provider Last Rate Last Dose    heparin Allergies    Problem List:    Patient Active Problem List   Diagnosis Code    DM II (diabetes mellitus, type II), controlled ([address] E11.9    HTN (hypertension) I10    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 ([address] I70.65    Coagulopathy (HCC) D68.9    Moderate protein-calorie malnutrition (HCC) E44.0    PVD (peripheral vascular disease) ([address] I73.9    Leukocytosis D72.829    Stage 3 chronic kidney disease (HCC) N18.3    Tobacco dependence F17.200    HLD (hyperlipidemia) E78.5    History of DVT (deep vein thrombosis) Z86.718    Osteomyelitis (HCC) M86.9    S/P AKA (above knee amputation), right ([address] Z89.611    History of vascular surgery Z98.890    Occlusion of common femoral artery (HCC) I70.209    Cellulitis, scrotum N49.2    Hyperglycemia due to type 2 diabetes mellitus ([address] E11.65       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 ([address] 5/22/2018    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 ([address] 5/21/2018    Diabetes mellitus ([address]     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 2 diabetes mellitus, with fat layer exposed ([address] 5/22/2018    DVT, lower extremity (HCC)     right leg     Hyperlipidemia     Hypertension     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[address]     PVD (peripheral vascular disease) ([address]        Past Surgical History:        Procedure Laterality Date    FEMORAL BYPASS      Right [address]    [place] AMPUTATE THIGH,RE-AMPUTATN Right 11/1/2018    AMPUTATION ABOVE KNEE RIGHT LEG performed by Sravanthi Johnson MD at [address] [place] [address] Right 5/24/2018    RIGHT FOOT INCISION AND DRAINAGE WITH PARTIAL BONE RESECTION performed by Ira Lockett DPM at [facility] OR    MO OFFICE/OUTPT VISIT,PROCEDURE ONLY Right 8/3/2018    INCISION AND DRAINAGE MULTIPLE AREAS RIGHT FOOT WITH DEBRIDEMENT SOFT TISSUE performed by James Wood DPM at [facility] [address] Right     leg        Social History:    Social History     Tobacco Use    Smoking status: Current Every Day Smoker     Packs/day: 0.50     Years: 7.00     Pack years: 3.50     Types: Cigarettes    Smokeless tobacco: Never Used    Tobacco comment: 5-7 a day    Substance Use Topics    Alcohol use: No                                Ready to quit: Not Answered  Counseling given: Not Answered  Comment: 5-7 a day       Vital Signs (Current):   Vitals:    03/20/20 1654 03/20/20 1944   BP: (!) 152/91 (!) 173/72   Pulse: 96 88   Resp: 16 20   Temp: 98.2 °F (36.8 °C)    SpO2: 97% 96%   Weight: 275 lb (124.7 kg)                                               BP Readings from Last 3 Encounters:   03/20/20 (!) 173/72   01/20/20 (!) 154/82   10/21/19 128/72       NPO Status: > 8hrs                                                    BMI:   Wt Readings from Last 3 Encounters:   03/20/20 275 lb (124.7 kg)   07/11/19 275 lb (124.7 kg)   06/14/19 275 lb (124.7 kg)     Body mass index is 35.31 kg/m². CBC:   Lab Results   Component Value Date    WBC 22.2 03/20/2020    RBC 5.88 03/20/2020    HGB 15.0 03/20/2020    HCT 46.5 03/20/2020    MCV 79.1 03/20/2020    RDW 13.9 03/20/2020     03/20/2020       CMP:   Lab Results   Component Value Date     03/20/2020    K 4.9 03/20/2020    K 4.9 03/20/2020    CL 94 03/20/2020    CO2 24 03/20/2020    BUN 32 03/20/2020    CREATININE 1.6 03/20/2020    GFRAA 53 03/20/2020    LABGLOM 53 03/20/2020    GLUCOSE 787 03/20/2020    PROT 7.7 03/20/2020    CALCIUM 9.4 03/20/2020    BILITOT 0.4 03/20/2020    ALKPHOS 151 03/20/2020    AST 37 03/20/2020    ALT 29 03/20/2020       POC Tests: No results for input(s): POCGLU, POCNA, POCK, POCCL, POCBUN, POCHEMO, POCHCT in the last 72 hours. Coags:   Lab Results   Component Value Date    PROTIME 22.0 10/25/2018    INR 1.9 10/25/2018    APTT 28.0 03/20/2020       HCG (If Applicable):  No results found for: PREGTESTUR, PREGSERUM, HCG, HCGQUANT     ABGs: No results found for: PHART, PO2ART, TBY0VHJ, RPT7KZW, BEART, P3HOIJVE     Type & Screen (If Applicable):  No results found for: LABABO, LABRH     EKG 7/31/2018  Sinus rhythm with 1st degree AV block  Nonspecific T wave abnormality  Abnormal ECG  When compared with ECG of 27-JUL-2018 14:43,  No significant change was found  Confirmed by Meg Blair ([id number]) on 8/1/2018 7:48:11 PM    ECHO 10/29/2018    Summary   No valvular vegetation.   Normal left ventricular systolic function.   Ejection fraction is visually estimated at > 60%.   Normal right ventricular function.   No evidence of a left atrial appendage thrombus.   No evidence of interatrial shunting on bubble study.   Mild mitral regurgitation.       CXR 8/6/2018  1. Left-sided PICC line tip in the left inguinal related vein   projection. 2. No pneumothorax on the right on the left.         Bone scan 10/25/2018    1. Abnormal right midfoot multiphase scintigraphic findings consistent   with ongoing osteomyelitis here under appropriate clinical   circumstances. Clinical and possible updated radiographic correlation   recommended. 2. Abnormal medial left forefoot multiphase scintigraphic findings,   suggestive of possible ongoing osteomyelitis here also, under   appropriate clinical circumstances. Clinical and possible radiographic   correlation recommended. 3. Additional multifocal bilateral lower extremity scintigraphic   findings more likely related to degenerative type changes as noted.         Lower extremity ultrasound 7/31/2018  CONCLUSION:       1. Superficial thrombophlebitis involving the left greater saphenous   vein.    2. No evidence of deep venous thrombosis involving the right lower   extremity.           Anesthesia Evaluation  Patient summary reviewed and Nursing notes reviewed no history of anesthetic complications:   Airway: Mallampati: IV     Neck ROM: full  Mouth opening: > = 3 FB Dental:          Pulmonary: breath sounds clear to auscultation  (+) pneumonia (Hx Legionnaire's disease ):  sleep apnea: on CPAP,  decreased breath sounds,  current smoker (last cigarette 4 days ago; pt states he smokes less than a 1/2 ppd)    (-) COPD, asthma and shortness of breath                          ROS comment: Hx rhinoplasty   Cardiovascular:  Exercise tolerance: poor (<4 METS),   (+) hypertension:, hyperlipidemia    (-) pacemaker, past MI, dysrhythmias and  angina    ECG reviewed  Rhythm: regular  Rate: normal  Echocardiogram reviewed                  Neuro/Psych:      (-) seizures, TIA and CVA           GI/Hepatic/Renal:   (+) renal disease (Hx CKD): CRI,      (-) GERD and liver disease       Endo/Other:    (+) DiabetesType II DM, poorly controlled, using insulin, blood dyscrasia: anticoagulation therapy:., electrolyte abnormalities, . ROS comment: Hx diabetic ulcer right midfoot   Hx OM Abdominal:   (+) obese,         Vascular:   + PVD, aortic or cerebral (Hx PVD), DVT (>5 years ago per pt), .        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 Anesthesia Plan      general     ASA 4 - emergent     (  )  Induction: intravenous. BIS and arterial line  MIPS: Postoperative opioids intended, Prophylactic antiemetics administered and Postoperative trial extubation. Anesthetic plan and risks discussed with patient. Use of blood products discussed with patient whom did not consent to blood products. Plan discussed with attending and CRNA. Harper Tyler DO   3/20/2020    Pt seen, examined, chart reviewed, plan discussed.   Harper Tyler  3/20/2020  9:04 PM

## 2020-03-21 NOTE — OP NOTE
Drake Carson  [id number]      DATE OF PROCEDURE: 3/21/2020    SURGEON: Dr. Joseph Brnuo MD    ASSISTANT: Gerard Burns - PGYIV    PREOPERATIVE DIAGNOSIS: Right scrotal abscess    POSTOPERATIVE DIAGNOSIS: Same    OPERATION: Right scrotal incision and drainage    ANESTHESIA: General    ESTIMATED BLOOD LOSS: 5 ml    COMPLICATIONS: None    SPECIMENS: Aerobic and anaerobic cultures      HISTORY: Drake Carson is a 60 y.o. male with a right scrotal abscess and RLE acute limb ischemia. I&D of right scrotal abscess was recommended. The risks benefits and alternatives of the procedure were discussed with the patient who stated understanding and agreed to proceed. DESCRIPTION OF PROCEDURE: The patient was brought to the operating room and positioned supine on the OR table. Sequential compression devices were placed on the patient's lower extremities and functioning. Preoperative antibiotics were administered. Anesthesia was obtained without complication as per the anesthesia record. Immediately prior to the procedure a time-out was called and the surgical checklist was reviewed and agreed upon by all present. The patient was prepped and draped in the usual sterile fashion. An incision was made in the right scrotum with a #11 blade through skin only over an area of fluctuance. Purulent drainage was obtained with incision. A counter incision was made in the right scrotum distal to the initial incision with a #15 blade through skin only. All purulent material was removed. The scrotum was irrigated. A penrose was placed through both incisions and secured into place with a 3-0 Nylon suture. The left testicle was palpable. The right testicle was not palpable. A dressing was applied. Needle, sponge, and instrument counts were reported as correct x2. Dr. Joseph Bruno was present and scrubbed throughout the case. The patient tolerated the procedure well without complications.  He was transferred to the recovery area in good

## 2020-03-21 NOTE — OP NOTE
Melodie Batch  [id number]      DATE OF PROCEDURE: 3/21/2020    SURGEON: Dr. Evaristo Leach MD    ASSISTANT: Nathalia Acosta - PGYIV    PREOPERATIVE DIAGNOSIS: Acute right lower extremity limb ischemia    POSTOPERATIVE DIAGNOSIS: Same    OPERATION:   1. Right groin exploration  2. Right iliofemoral artery embolectomy with primary closure of arteriotomy  3. Right deep femoral artery embolectomy   4. Right lower extremity diagnostic incision evaluation    ANESTHESIA: General    ESTIMATED BLOOD LOSS: 416 ml    COMPLICATIONS: None    SPECIMENS: None    HISTORY: Melodie Castrejon is a 60 y.o. male with acute right lower extremity ischemia. Right lower extremity embolectomy, possible angiogram, possible other intervention was recommended. The risks benefits and alternatives of the procedure were discussed with the patient who stated understanding and agreed to proceed. DESCRIPTION OF PROCEDURE: The patient was brought to the operating room and positioned supine on the OR table. Sequential compression devices were placed on the patient's lower extremities and functioning. Preoperative antibiotics were administered. Anesthesia was obtained without complication as per the anesthesia record. Immediately prior to the procedure a time-out was called and the surgical checklist was reviewed and agreed upon by all present. The patient was prepped and draped in the usual sterile fashion. A longitudinal right groin incision was made using a #10 blade. Dissection was carried down through subcutaneous tissue with electrocautery to the level of the femoral vein. We continued this dissection with Metzenbaum scissors lateral to the femoral vein. The common femoral artery was identified and circumferential dissection freed from surrounding tissue. Proximal control of the vessel was obtained with a vessel loop. Next, we proceed to identify and control the deep femoral artery.  The deep femoral artery was dissected from surrounding tissues with Metzenbaum scissors and distal control was obtained with a vessel loop. This dissection was complicated by an extensive amount of scar tissue due to multiple previous surgeries, depth, and difficulty. This was significantly more difficulty dissection wise than typical cutdown exposure. A longitudinal arteriotomy on the common femoral artery was made with a #11 blade. The arteriotomy was extend with Pots scissors. Clot was removed. Next, an iliofemoral embolectomy was performed with an 4  Raul catheter. A large amount of old and new clot was removed. Several passes with the 4 and than the 5 Raul catheter were done until brisk, pulsatile inflow was obtained through the common femoral artery and no further clot was removed. The proximal common femoral artery was clamped after the iliofemoral embolectomy was completed. Next, a distal deep femoral artery embolectomy was performed with a Raul catheter. Several passes were made until adequate back bleeding was obtained and no further clot was noted. A bulldog clamp was placed over the deep femoral artery. The longitudinal arteriotomy was then closed with a 5-0 Prolene. Clamps were removed. The common femoral and deep femoral artery had palpable and dopplerable flow. Hemostasis was obtained. The incision was irrigated and then closed in multiple layers. Renny's fascia was closed with 2-0 Vicryl. Deep dermal layer was closed with 3-0 Vicryl. Subcuticular layer was closed with 5-0 Vicryl. Skin glue was applied. Next, we performed a diagnostic incision and drainage of the right lateral thigh secondary to concerns for possible necrotizing soft tissue infection from prior imaging studies. The muscle during the right groin exploration was noted to viable. A longitudinal incision was made in the right lateral thigh using a #10 blade. Dissection was carried down to muscle fascia using a electrocautery. The muscle fascia was entered.  The muscle was evaluate and

## 2020-03-21 NOTE — PROGRESS NOTES
exclusive of teaching and procedures = 35 min     Pt needs continuous ICU monitoring because the patient is at risk for deterioration from a hyperglycemia/ critical limb ischemia standpoint.     Ludy Rogers MD, FACS  3/21/2020  9:22 AM

## 2020-03-21 NOTE — ANESTHESIA PROCEDURE NOTES
Arterial Line:    An arterial line was placed in the OR for the following indication(s): continuous blood pressure monitoring and blood sampling needed. A 20 gauge (size), 1 and 3/4 inch (length), Arrow (type) catheter was placed, Seldinger technique used, into the left radial artery, secured by tape and Tegaderm. Anesthesia type: General    Events:  patient tolerated procedure well with no complications.   3/20/2020 9:29 9/33/4028 9:35 PM  Resident/CRNA: SHONDA Burton CRNA  Performed: Resident/CRNA   Preanesthetic Checklist  Completed: patient identified, site marked, surgical consent, pre-op evaluation, timeout performed, IV checked, risks and benefits discussed, monitors and equipment checked, anesthesia consent given, oxygen available and patient being monitored

## 2020-03-21 NOTE — CONSULTS
Pharmacy Consultation Note  (Antibiotic Dosing and Monitoring)    Initial consult date: 20  Consulting physician: Yancy Duverney  Drug: Vancomycin  Indication: Cellulitis/Abscess    Age/  Gender Height Weight IBW Dosing weight  Allergy Information   64 y.o./male 6' 2\" (188 cm) 275 lb (124.7 kg)     Ideal body weight: 82.2 kg (181 lb 3.5 oz)  Adjusted ideal body weight: 99.2 kg (218 lb 11.7 oz)  99.2kg  Patient has no known allergies. Temp (24hrs), Av.1 °F (36.2 °C), Min:94.1 °F (34.5 °C), Max:98.2 °F (36.8 °C)          Date  WBC BUN SCr CrCl  (mL/min) Drug/Dose Time   Given Level(s)   (Time) Comments   3/20/20  22.2  32  1.6  67   Vancomycin 2000 mg IV  3/20/20 2130                                            Intake/Output Summary (Last 24 hours) at 3/21/2020 0103  Last data filed at 3/20/2020 2358  Gross per 24 hour   Intake 2100 ml   Output 485 ml   Net 1615 ml       Historical Cultures:  Organism   Date Value Ref Range Status   10/24/2018 Staphylococcus aureus (A)  Corrected     No results for input(s): BC in the last 72 hours. Cultures:  available culture and sensitivity results were reviewed in [facility]    Assessment:  · 60 y.o. male has been initiated Vancomycin.   · Estimated CrCl = 67 mL/min  · Goal trough level = 15-20 mcg/mL    Plan:  · Will initiate vancomycin at a dose of 2000mg every 12 hours  · Monitor renal function   · Clinical pharmacy to follow      Chelsey Stern [address] [facility] 3/21/2020 1:03 AM

## 2020-03-21 NOTE — CONSULTS
3/21/2020 3:09 PM  Service: Urology  Group: [facility] ([facility])    Drake Carson  [id number]     Chief Complaint:    Right scrotal swelling right testicular pain    History of Present Illness: The patient is a 60 y.o. male patient who presents with right scrotal swelling as well as right leg ischemia. The patient had incision and drainage of scrotal abscess and also had a right iliofemoral artery embolectomy as well as a deep femoral artery embolectomy. He is status post incision and drainage of the right lower extremity as well. To my knowledge she has not had any voiding symptoms with the his illness. He is afebrile. His BUN is 32 his creatinine is 1.6. His proBNP is 3315. His hemoglobin A1c is 11.3. Blood sugar was 689 improved to 833. His white count is 22,200. Hemoglobin is 15 hematocrit 46. Platelet count is normal 194,000  .   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 ([address] 5/22/2018    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 ([address] 5/21/2018    Critical lower limb ischemia 3/20/2020    Diabetes mellitus ([address]     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 2 diabetes mellitus, with fat layer exposed ([address] 5/22/2018    DVT, lower extremity (HCC)     right leg     Gas gangrene of thigh ([address] 3/20/2020    Hyperlipidemia     Hypertension     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[address]     PVD (peripheral vascular disease) ([address]          Past Surgical History:   Procedure Laterality Date    FEMORAL BYPASS      Right [facility] AMPUTATE THIGH,RE-AMPUTATN Right 11/1/2018    AMPUTATION ABOVE KNEE RIGHT LEG performed by Bobby He MD at [address] [place] [address] Right 5/24/2018    RIGHT FOOT INCISION AND daily  Insulin Syringe-Needle U-100 30G X 5/16\" 0.3 ML MISC, 1 box by Does not apply route 5 times daily  ferrous sulfate 325 (65 Fe) MG tablet, Take 1 tablet by mouth 2 times daily (with meals) (Patient not taking: Reported on 10/21/2019)  lidocaine (LIDODERM) 5 %, Place 1 patch onto the skin daily 12 hours on, 12 hours off.  aspirin 81 MG chewable tablet, Take 1 tablet by mouth daily  lisinopril (PRINIVIL;ZESTRIL) 10 MG tablet, Take 1 tablet by mouth daily Hold if sbp<140  cilostazol (PLETAL) 100 MG tablet, Take 100 mg by mouth 2 times daily  tiZANidine (ZANAFLEX) 4 MG tablet, Take 4 mg by mouth every 8 hours as needed   pregabalin (LYRICA) 100 MG capsule, Take 200 mg by mouth 3 times daily. Jerrell Vilchis spironolactone (ALDACTONE) 50 MG tablet, Take 50 mg by mouth every morning   atorvastatin (LIPITOR) 10 MG tablet, Take 10 mg by mouth daily   esomeprazole (NEXIUM) 40 MG delayed release capsule, Take 40 mg by mouth every morning (before breakfast)    Allergies:    Patient has no known allergies. Social History:    reports that he has been smoking cigarettes. He has a 3.50 pack-year smoking history. He has never used smokeless tobacco. He reports that he does not drink alcohol or use drugs. Family History:   Non-contributory to this Urological problem  family history includes Cancer in his mother and sister; Diabetes in his father; Heart Failure in his father; Hypertension in his father.     Review of Systems:  Respiratory: negative for cough and hemoptysis  Cardiovascular: negative for chest pain and dyspnea  Gastrointestinal: negative for abdominal pain, diarrhea, nausea and vomiting  Derm: negative for rash and skin lesion(s)  Neurological: negative for seizures and tremors  Endocrine: negative for diabetic symptoms including polydipsia and polyuria  : As above in the HPI, otherwise negative  All other reviews are negative    Physical Exam:     Vitals:  BP (!) 207/76   Pulse 94   Temp 97 °F (36.1 °C) (Temporal) Resp 16   Ht 6' 2\" (1.88 m)   Wt 275 lb (124.7 kg)   SpO2 97%   BMI 35.31 kg/m²     General:  Awake, alert, oriented X 3. Well developed, well nourished, well groomed. No apparent distress. HEENT:  Normocephalic, atraumatic. Pupils equal, round. No scleral icterus. No conjunctival injection. Normal lips, teeth, and gums. No nasal discharge. Neck:  Supple, no masses. Heart:  RRR  Lungs:  No audible wheezing. Respirations symmetric and non-labored. Abdomen:  soft, nontender, no masses, no organomegaly, no peritoneal signs  Extremities:, Amputated AKA right leg skin:  Warm and dry, no open lesions or rashes  Neuroamputated right legRectal:   Genitalia:  circed catheter is well-positioned  Testes are atrophic slight tenderness but no edema of the scrotum  Labs:     Recent Labs     03/20/20 1754 03/21/20  0045 03/21/20  0546   WBC 22.2* 22.4* 21.7*   RBC 5.88* 4.97 5.08   HGB 15.0 12.7 13.5   HCT 46.5 39.5 41.0   MCV 79.1* 79.5* 80.7   MCH 25.5* 25.6* 26.6   MCHC 32.3 32.2 32.9   RDW 13.9 14.1 14.1    173 179   MPV NOT CALC 13.3* NOT CALC         Recent Labs     03/20/20 2024 03/21/20  0045 03/21/20  0546   CREATININE 1.6* 1.7* 1.6*         Assessment:  Hillary Courts 60 y.o. male   His testalgia is not associated with infection. Torsion is unlikely at this age. The catheter is well-positioned.   There is no scrotal edema    Plan:    No intervention is needed for the right testalgia he can be treated symptomatically no additional urologic testing is needed    Electronically signed by Ana Maria Coyle MD on 3/21/2020 at 3:09 PM

## 2020-03-21 NOTE — H&P
[place] OFFICE/OUTPT VISIT,PROCEDURE ONLY Right 8/3/2018    INCISION AND DRAINAGE MULTIPLE AREAS RIGHT FOOT WITH DEBRIDEMENT SOFT TISSUE performed by Consuelo Oh DPM at [place] [address] Right     leg          Medications Prior to Admission:    Medications Prior to Admission: ONE TOUCH ULTRA TEST strip, USE TO TEST BLOOD SUGARS DAILY AS NEEDED  HYDROcodone-acetaminophen (NORCO) 5-325 MG per tablet, Take 1 tablet by mouth every 6 hours as needed for Pain for up to 30 days. Intended supply: 30 days  For breakthrough  oxyCODONE ER 13.5 MG C12A, Take 13.5 mg by mouth 2 times daily for 30 days. DULoxetine (CYMBALTA) 60 MG extended release capsule, TAKE 2 CAPSULES BY MOUTH EVERY MORNING  cloNIDine (CATAPRES) 0.1 MG tablet, TAKE 1 TABLET BY MOUTH 3 TIMES A DAY  amLODIPine (NORVASC) 10 MG tablet, TAKE ONE TABLET BY MOUTH EVERY MORNING - HOLD IF SBP<140  LANTUS 100 UNIT/ML injection vial, INJECT 15 UNITS INTO THE SKIN TWO TIMES A DAY  lubiprostone (AMITIZA) 24 MCG capsule, Take 1 capsule by mouth 2 times daily (with meals)  metoprolol (LOPRESSOR) 100 MG tablet, TAKE 1 TABLET BY MOUTH 2 TIMES A DAY  HYDROcodone-acetaminophen (NORCO) 5-325 MG per tablet, Take 1 tablet by mouth every 4 hours as needed for Pain for up to 30 days. insulin lispro (HUMALOG) 100 UNIT/ML injection vial, Inject 5 Units into the skin 3 times daily (with meals)  Handicap Placard MISC, by Does not apply route Patient cannot walk 200 ft without stopping to rest.   Expiration 10/21/2024  pregabalin (LYRICA) 300 MG capsule, Take 1 capsule by mouth 2 times daily for 30 days.   naloxone 4 MG/0.1ML LIQD nasal spray, 1 spray by Nasal route as needed for Opioid Reversal  naloxone 4 MG/0.1ML LIQD nasal spray, 1 spray by Nasal route as needed for Opioid Reversal (Patient not taking: Reported on 10/21/2019)  LANCETS MICRO THIN 35V MISC, 1 applicator by Does not apply route daily  Insulin Syringe-Needle U-100 30G X 5/16\" 0.3 ML MISC, 1 box by Does not apply service: Not on file     Active member of club or organization: Not on file     Attends meetings of clubs or organizations: Not on file     Relationship status: Not on file    Intimate partner violence     Fear of current or ex partner: Not on file     Emotionally abused: Not on file     Physically abused: Not on file     Forced sexual activity: Not on file   Other Topics Concern    Not on file   Social History Narrative    Not on file         Family History:       Problem Relation Age of Onset    Cancer Mother     Diabetes Father     Heart Failure Father     Hypertension Father     Cancer Sister        REVIEW OF SYSTEMS:    General ROS: negative  Hematological and Lymphatic ROS: negative  Endocrine ROS: negative  Respiratory ROS: no cough,  wheezing  or shortness of breath,   Cardiovascular ROS: no chest pain or dyspnea on exertion  Gastrointestinal ROS: no abdominal pain, change in bowel habits, or black or bloody stools  Genito-Urinary ROS: no dysuria, trouble voiding, or hematuria  Neurological ROS: no TIA or stroke symptoms  negative    Vitals:  BP (!) 207/76   Pulse 92   Temp 97.4 °F (36.3 °C)   Resp 23   Ht 6' 2\" (1.88 m)   Wt 275 lb (124.7 kg)   SpO2 95%   BMI 35.31 kg/m²     PHYSICAL EXAM:  General:  Awake, alert, oriented X 3. Well developed, well nourished, well groomed. No apparent distress. HEENT:  Normocephalic, atraumatic. Pupils equal, round, reactive to light. No scleral icterus. No conjunctival injection. Neck:  Supple, no carotid bruits  Heart:  RRR,   Lungs:  CTA bilaterally, bilat symmetrical expansion, no wheeze, rales, or rhonchi  Abdomen:   Bowel sounds present, soft, nontender, no masses, no organomegaly, no peritoneal signs  Extremities:  No clubbing, cyanosis, or edema  Skin:  Warm and dry, no open lesions or rash  Neuro:  Cranial nerves 2-12 intact, no focal deficits      DATA:     Recent Results (from the past 24 hour(s))   EKG 12 Lead    Collection Time: 03/20/20 5:31 PM   Result Value Ref Range    Ventricular Rate 88 BPM    Atrial Rate 88 BPM    P-R Interval 200 ms    QRS Duration 90 ms    Q-T Interval 394 ms    QTc Calculation (Bazett) 476 ms    P Axis 48 degrees    R Axis 1 degrees    T Axis 36 degrees   CBC Auto Differential    Collection Time: 03/20/20  5:54 PM   Result Value Ref Range    WBC 22.2 (H) 4.5 - 11.5 E9/L    RBC 5.88 (H) 3.80 - 5.80 E12/L    Hemoglobin 15.0 12.5 - 16.5 g/dL    Hematocrit 46.5 37.0 - 54.0 %    MCV 79.1 (L) 80.0 - 99.9 fL    MCH 25.5 (L) 26.0 - 35.0 pg    MCHC 32.3 32.0 - 34.5 %    RDW 13.9 11.5 - 15.0 fL    Platelets 167 519 - 456 E9/L    MPV NOT CALC 7.0 - 12.0 fL    Neutrophils % 86.7 (H) 43.0 - 80.0 %    Immature Granulocytes % 0.9 0.0 - 5.0 %    Lymphocytes % 6.4 (L) 20.0 - 42.0 %    Monocytes % 5.8 2.0 - 12.0 %    Eosinophils % 0.0 0.0 - 6.0 %    Basophils % 0.2 0.0 - 2.0 %    Neutrophils Absolute 19.26 (H) 1.80 - 7.30 E9/L    Immature Granulocytes # 0.21 E9/L    Lymphocytes Absolute 1.43 (L) 1.50 - 4.00 E9/L    Monocytes Absolute 1.28 (H) 0.10 - 0.95 E9/L    Eosinophils Absolute 0.00 (L) 0.05 - 0.50 E9/L    Basophils Absolute 0.04 0.00 - 0.20 E9/L   Comprehensive Metabolic Panel w/ Reflex to MG    Collection Time: 03/20/20  5:54 PM   Result Value Ref Range    Sodium 130 (L) 132 - 146 mmol/L    Potassium reflex Magnesium 4.9 3.5 - 5.0 mmol/L    Chloride 94 (L) 98 - 107 mmol/L    CO2 25 22 - 29 mmol/L    Anion Gap 11 7 - 16 mmol/L    Glucose 787 (HH) 74 - 99 mg/dL    BUN 34 (H) 8 - 23 mg/dL    CREATININE 1.6 (H) 0.7 - 1.2 mg/dL    GFR Non-African American 53 >=60 mL/min/1.73    GFR African American 53     Calcium 9.6 8.6 - 10.2 mg/dL    Total Protein 7.7 6.4 - 8.3 g/dL    Alb 3.1 (L) 3.5 - 5.2 g/dL    Total Bilirubin 0.4 0.0 - 1.2 mg/dL    Alkaline Phosphatase 166 (H) 40 - 129 U/L    ALT 30 0 - 40 U/L    AST 38 0 - 39 U/L   Troponin    Collection Time: 03/20/20  5:54 PM   Result Value Ref Range    Troponin 0.08 (H) 0.00 - 0.03 ng/mL  53     Calcium 8.4 (L) 8.6 - 10.2 mg/dL    Total Protein 6.9 6.4 - 8.3 g/dL    Alb 2.9 (L) 3.5 - 5.2 g/dL    Total Bilirubin 0.5 0.0 - 1.2 mg/dL    Alkaline Phosphatase 127 40 - 129 U/L    ALT 24 0 - 40 U/L    AST 56 (H) 0 - 39 U/L   MAGNESIUM    Collection Time: 03/21/20  5:46 AM   Result Value Ref Range    Magnesium 2.1 1.6 - 2.6 mg/dL   Phosphorus    Collection Time: 03/21/20  5:46 AM   Result Value Ref Range    Phosphorus 2.6 2.5 - 4.5 mg/dL   Calcium, ionized    Collection Time: 03/21/20  5:46 AM   Result Value Ref Range    Calcium, Ion 1.24 1.15 - 1.33 mmol/L   Hemoglobin A1C    Collection Time: 03/21/20  5:46 AM   Result Value Ref Range    Hemoglobin A1C 11.3 (H) 4.0 - 5.6 %   POCT Glucose    Collection Time: 03/21/20  9:24 AM   Result Value Ref Range    Meter Glucose 337 (H) 74 - 99 mg/dL   APTT    Collection Time: 03/21/20  9:30 AM   Result Value Ref Range    aPTT 64.3 (H) 24.5 - 35.1 sec   Vancomycin, trough    Collection Time: 03/21/20  9:30 AM   Result Value Ref Range    Vancomycin Tr 12.5 5.0 - 16.0 mcg/mL       CTA ABDOMINAL AORTA W BILAT RUNOFF W CONTRAST   Final Result   There is complete thrombotic occlusion of the origin of the right   common femoral artery with some gas in the proximal superficial   femoral artery in the upper thigh, with gas extending into the   quadriceps muscles of the upper thigh amputation stump. There is a fluid collection in the right hemiscrotal subcutaneous fat,   which is not associated with gangrenous gas or prominent scrotal wall   thickening. Atherosclerotic runoff findings are noted in the left leg, which has   arterial patency to the ankle, primarily in the posterior tibial and   peroneal distributions. ALERT:  THIS IS AN ABNORMAL REPORT-thrombosis of the common femoral   artery with gangrene in the upper thigh musculature.          Note: Emergency department physician was contacted telephonically at   the time of this dictation to communicate findings. XR FEMUR RIGHT (MIN 2 VIEWS)   Final Result   1. Normal chest with improved aeration of the lungs. 2. Some gas is identified in the ventral 5 soft tissues along the   proximal femur, suggesting gas in venous structures and along fascial   planes. ALERT:  THIS IS AN ABNORMAL REPORT      XR CHEST PORTABLE   Final Result   1. Normal chest with improved aeration of the lungs. 2. Some gas is identified in the ventral 5 soft tissues along the   proximal femur, suggesting gas in venous structures and along fascial   planes. ALERT:  THIS IS AN ABNORMAL REPORT              ASSESSMENT :      Principal Problem:    Critical lower limb ischemia  Active Problems:    DM II (diabetes mellitus, type II), controlled ([facility])    HTN (hypertension)    Leukocytosis    Stage 3 chronic kidney disease (HCC)    HLD (hyperlipidemia)    Occlusion of common femoral artery ([facility])    Cellulitis, scrotum    Hyperglycemia due to type 2 diabetes mellitus ([facility])    Gas gangrene of thigh ([facility]    Vascular occlusion  Resolved Problems:    * No resolved hospital problems. *        Plan :    Continue antibiotics  Blood pressures running high periodically  PRN hydralazine  Pain control    Electronically signed by Radha Canales MD on 3/21/2020 at 11:26 AM    NOTE: This report was transcribed using voice recognition software.  Every effort was made to ensure accuracy; however, inadvertent transcription errors may be present

## 2020-03-21 NOTE — FLOWSHEET NOTE
Stable vital signs; breathing nonlabored on 5L nc, follows simple commands. Signed out of anesthesia service.

## 2020-03-21 NOTE — CONSULTS
Vascular Surgery Consultation Note    Reason for Consult:  R LE ischemia    HPI : This is a 60 y.o. male admitted on 3/20/2020  4:49 PM with R LE ischemia. He says he has been having pain in his right stump since ~ 3/12/20. He denies similar pain previously. Vascular surgery is consulted in regards to R LE ischemia.       ROS : All others Negative if blank [], Positive if [x]  General Urinary   [] Fevers [] Hematuria   [] Chills [] Dysuria   [] Weight Loss Vascular   Skin [] Claudication   [] Tissue Loss [x] Rest Pain   Eyes Neurologic   [x] Wears Glasses/Contacts [] Stroke/TIA   [] Vision Changes [] Focal weakness   Respiratory [] Slurred Speech    [] Shortness of breath ENT   Cardiovascular [] Difficulty swallowing   [] Chest Pain Endocrine    [x] Shortness of breath with exertion [] Increased Thirst   Gastrointestinal    [] Abdominal Pain    [] Melena   [] Hematochezia         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 ([address] 5/22/2018    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 ([address] 5/21/2018    Diabetes mellitus ([address]     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 2 diabetes mellitus, with fat layer exposed ([address] 5/22/2018    DVT, lower extremity (HCC)     right leg     Hyperlipidemia     Hypertension     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[address]     PVD (peripheral vascular disease) ([address]         Past Surgical History:   Procedure Laterality Date    FEMORAL BYPASS      Right [facility] AMPUTATE THIGH,RE-AMPUTATN Right 11/1/2018    AMPUTATION ABOVE KNEE RIGHT LEG performed by Bhavna Pagan MD at [address] [place] [address] Right 5/24/2018    RIGHT FOOT INCISION AND DRAINAGE WITH PARTIAL BONE RESECTION performed by Caterina Vallejo DPM at [facility] OR    [place] OFFICE/OUTPT VISIT,PROCEDURE ONLY Right 8/3/2018    INCISION AND DRAINAGE MULTIPLE AREAS RIGHT FOOT activity: Not on file   Other Topics Concern    Not on file   Social History Narrative    Not on file     Family History   Problem Relation Age of Onset    Cancer Mother     Diabetes Father     Heart Failure Father     Hypertension Father     Cancer Sister      PHYSICAL EXAM:    BP (!) 173/72   Pulse 88   Temp 98.2 °F (36.8 °C)   Resp 20   Wt 275 lb (124.7 kg)   SpO2 96%   BMI 35.31 kg/m²   CONSTITUTIONAL:   Awake, alert, cooperative  PSYCHIATRIC :  Oriented to time, place and person      Appropriate insight to disease process  EYES: Lids and lashes normal  ENT:  External ears and nose without lesions   Hearing deficits not noted  NECK: Supple, symmetrical, trachea midline   Thyroid goiter not appreciate  LUNGS:  No increased work of breathing                 Good respiratory excursion  CARDIOVASCULAR: irregular irregular  ABDOMEN:  soft, non-distended, non-tender   Hernias not noted   Aorta is not palpable  Lymphatics : Cervical lymphadenopathy notnoted     Femoral lymphadenopathy notnoted  SKIN:   Normal skin color   Texture and turgor normal, no induration  EXTREMITIES:   R UE 5/5 strength   No cyanosis noted in nail beds  L UE 5/5 strength   No cyanosis noted in nail beds  R LE Edema absent   Open wounds absent    Cold stump, areas of ecchymosis, no obvious crepitance on exam  L LE Edema absent   Open wound absent  R femoral No flow L femoral 1+   R posterior tibial aka L posterior tibial Weakly biphasic   R dorsalis pedis aka L dorsalis pedis Weakly biphasic     LABS:    Lab Results   Component Value Date    WBC 22.2 (H) 03/20/2020    HGB 15.0 03/20/2020    HCT 46.5 03/20/2020     03/20/2020    PROTIME 22.0 (H) 10/25/2018    INR 1.9 10/25/2018    K 4.9 03/20/2020    BUN 32 (H) 03/20/2020    CREATININE 1.6 (H) 03/20/2020     Radiology pertinent to vascular surgery evaluation reviewed    Assesment/Plan  Acute R LE ischemia  Gas in Muscle of R AKA stump  · Heparin drip  · Thrombectomy, possible

## 2020-03-21 NOTE — CONSULTS
Surgical Intensive Care/General Surgery  Consult  Note  Date of admission:  3/20/2020    Reason for ICU transfer: Critical limb ischemia and hyperglycemia    Chief Complaint   Patient presents with    Hip Pain     right hip and scrotal pain, denies injury     Hyperglycemia     \"HI\" per EMS        HPI: 60year old gentleman with RLE pain since 3/12. He also endorsed R hip and testicular pain. He is s/p R AKA on 11/1/2018. He states since about 3/12 his stump had been swelling and progressively more cold. He has also been having increased pain and warmth to his right scortum. He has a history of Diabetes, HTN, PVD. He is s/p R iliofemoral artery embolectomy, right lower extremity diagnostic incision and drainage, and right scrotal incision and drainage on 3/20.      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 ([address] 5/22/2018    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 ([address] 5/21/2018    Critical lower limb ischemia 3/20/2020    Diabetes mellitus ([address]     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 2 diabetes mellitus, with fat layer exposed ([address] 5/22/2018    DVT, lower extremity (HCC)     right leg     Gas gangrene of thigh ([address] 3/20/2020    Hyperlipidemia     Hypertension     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[address]     PVD (peripheral vascular disease) ([address]       Past Surgical History:   Procedure Laterality Date    FEMORAL BYPASS      Right [facility] AMPUTATE THIGH,RE-AMPUTATN Right 11/1/2018    AMPUTATION ABOVE KNEE RIGHT LEG performed by Darian Cheung MD at [facility] Right 5/24/2018    RIGHT FOOT INCISION AND DRAINAGE WITH PARTIAL BONE RESECTION performed by Van Quiroga DPM at [address] [place] [address] OFFICE/OUTPT VISIT,PROCEDURE ONLY Right 8/3/2018    INCISION AND DRAINAGE MULTIPLE AREAS RIGHT FOOT WITH DEBRIDEMENT SOFT TISSUE performed by Pradeep Bryant DPM at [place] [address] Right     leg       Patient has no known allergies.    Current Facility-Administered Medications   Medication Dose Route Frequency Provider Last Rate Last Dose    amLODIPine (NORVASC) tablet 10 mg  10 mg Oral Daily LORRAINE Lucero        aspirin chewable tablet 81 mg  81 mg Oral Daily LORRAINE Nathan        atorvastatin (LIPITOR) tablet 10 mg  10 mg Oral Daily Warner Lucero        cilostazol (PLETAL) tablet 100 mg  100 mg Oral BID LORRAINE Lucero        cloNIDine (CATAPRES) tablet 0.1 mg  0.1 mg Oral TID LORRAINE Lucero        DULoxetine (CYMBALTA) extended release capsule 120 mg  120 mg Oral QAM LORRAINE Lucero        insulin glargine (LANTUS) injection vial 15 Units  15 Units Subcutaneous BID LORRAINE Lucero   15 Units at 03/21/20 0136    lisinopril (PRINIVIL;ZESTRIL) tablet 10 mg  10 mg Oral Daily LORRAINE Lucero        lubiprostone (AMITIZA) capsule 24 mcg  24 mcg Oral BID WC LORRAINE Lucero        metoprolol tartrate (LOPRESSOR) tablet 100 mg  100 mg Oral BID LORRAINE Lucero        pregabalin (LYRICA) capsule 200 mg  200 mg Oral TID LORRAINE Lucero        spironolactone (ALDACTONE) tablet 50 mg  50 mg Oral QAM LORRAINE Lucero        glucose (GLUTOSE) 40 % oral gel 15 g  15 g Oral PRN LORRAINE Lucero        dextrose 50 % IV solution  12.5 g Intravenous PRN LORRAINE Lucero        glucagon (rDNA) injection 1 mg  1 mg Intramuscular PRN LORRAINE Lucero        dextrose 5 % solution  100 mL/hr Intravenous PRN LORRAINE Lucero        piperacillin-tazobactam (ZOSYN) 3.375 g in dextrose 5 % 100 mL IVPB extended infusion (mini-bag)  3.375 g Intravenous Q8H LORRAINE Lucero        sodium chloride flush 0.9 % injection 10 mL  10 mL Intravenous 2 times per day LORRAINE Lucero        sodium chloride flush 0.9 % injection 10 mL  10 mL Intravenous PRN LORRAINE Lucero        potassium disease ([facility]    HLD (hyperlipidemia)    Occlusion of common femoral artery (HCC)    Cellulitis, scrotum    Hyperglycemia due to type 2 diabetes mellitus ([facility]    Gas gangrene of thigh ([facility]  Resolved Problems:    * No resolved hospital problems.  *      Plan   GI:  NPO ,  Milk of Magnesium   Neuro: prn Tylenol   prn Oxycodone   prn Dilaudid  Lyrica  Renal: 125cc NS, Monitor Urine Output, Daily CBC,BMP, Mg,Phos, ionized Ca  Musculoskeletal: Bedrest for 24 hours then ambulate per vascular surgery, AM-PAC Score when able  Pulmonary: Aggressive pulmonary hygiene , SMI , Monitor RR and Maintain SpO2 > 92%  ID: Vancomycin, Zosyn, for Wound Infection Abscess Culture pending and   Monitor leukocytosis and Monitor Fever Curve  Heme: No indication for Transfusion , Monitor Hb   Cardiac: Monitor Hemodynamics  Metoprolol Lisinopril Norvasc Clonidine Statin PRN Hydralazine and Lopressor  aldactone, SBP < 160, heparin gtt for RLE critical limb ischemia, continue ASA, Pletal   Endocrine: continue SSI  and [id number] Lantus BID    DVT Prophylaxis: PCDs, therapeutic Heparin   Ulcer Prophylaxis: No Ulcer Prophylaxis Indicated   Tubes and Lines: Continue PIV, Continue Servin for critical care management  and Continue Arterial Line    Seizure proph:     No Indication  Ancillary consults:   Internal Medicine , Vascular Surgery  and PT/OT when able    Family Update:         As available   CODE Status:       Full Code    Dispo: ICU    Ashleigh Chou MD

## 2020-03-21 NOTE — PROGRESS NOTES
Date: 3/21/2020    Time: 1135  Patient Placed On BIPAP/CPAP/ Non-Invasive Ventilation? Yes    If no must comment. Facial area red/color change? No           If YES are Blister/Lesion present? No   If yes must notify nursing staff  BIPAP/CPAP skin barrier?   Yes    Skin barrier type:duoderm       Comments: Patient placed on [address]

## 2020-03-21 NOTE — ANESTHESIA POSTPROCEDURE EVALUATION
Department of Anesthesiology  Postprocedure Note    Patient: Angle Cook  MRN: [id number]  YOB: 1955  Date of evaluation: 3/21/2020  Time:  6:56 AM     Procedure Summary     Date:  03/20/20 Room / Location:  [place] [address] / [facility]    Anesthesia Start:  2115 Anesthesia Stop:  03/21/20 0016    Procedure:  RIGHT LOWER EXTREMITY THROMBECTOMY, POSSIBLE ANGIOGRAM, POSSIBLE INTERVENTION, POSSIBLE BYPASS. (Right ) Diagnosis:  (CLOT)    Surgeon:  Felipe Cash MD Responsible Provider:  Irena Salgado DO    Anesthesia Type:  general ASA Status:  4 - Emergent          Anesthesia Type: general    Blaine Phase I: Blaine Score: 8    Blaine Phase II: Blaine Score: 9    Last vitals: Reviewed and per EMR flowsheets.        Anesthesia Post Evaluation    Patient location during evaluation: ICU  Patient participation: complete - patient participated  Level of consciousness: awake and alert  Airway patency: patent  Nausea & Vomiting: no nausea and no vomiting  Complications: no  Cardiovascular status: blood pressure returned to baseline  Respiratory status: acceptable  Hydration status: euvolemic

## 2020-03-22 LAB
ALBUMIN SERPL-MCNC: 2.7 G/DL (ref 3.5–5.2)
ALP BLD-CCNC: 102 U/L (ref 40–129)
ALT SERPL-CCNC: 23 U/L (ref 0–40)
ANION GAP SERPL CALCULATED.3IONS-SCNC: 13 MMOL/L (ref 7–16)
APTT: 68.6 SEC (ref 24.5–35.1)
AST SERPL-CCNC: 89 U/L (ref 0–39)
BILIRUB SERPL-MCNC: 0.5 MG/DL (ref 0–1.2)
BUN BLDV-MCNC: 25 MG/DL (ref 8–23)
CALCIUM IONIZED: 1.3 MMOL/L (ref 1.15–1.33)
CALCIUM SERPL-MCNC: 8.9 MG/DL (ref 8.6–10.2)
CHLORIDE BLD-SCNC: 107 MMOL/L (ref 98–107)
CO2: 22 MMOL/L (ref 22–29)
CREAT SERPL-MCNC: 1.9 MG/DL (ref 0.7–1.2)
CRITICAL: NORMAL
DATE ANALYZED: NORMAL
DATE OF COLLECTION: NORMAL
GFR AFRICAN AMERICAN: 44
GFR NON-AFRICAN AMERICAN: 44 ML/MIN/1.73
GLUCOSE BLD-MCNC: 62 MG/DL (ref 74–99)
HCT VFR BLD CALC: 37.9 % (ref 37–54)
HEMOGLOBIN: 12 G/DL (ref 12.5–16.5)
LAB: NORMAL
Lab: NORMAL
MAGNESIUM: 2.2 MG/DL (ref 1.6–2.6)
MCH RBC QN AUTO: 25.8 PG (ref 26–35)
MCHC RBC AUTO-ENTMCNC: 31.7 % (ref 32–34.5)
MCV RBC AUTO: 81.3 FL (ref 80–99.9)
METER GLUCOSE: 113 MG/DL (ref 74–99)
METER GLUCOSE: 114 MG/DL (ref 74–99)
METER GLUCOSE: 130 MG/DL (ref 74–99)
METER GLUCOSE: 139 MG/DL (ref 74–99)
METER GLUCOSE: 170 MG/DL (ref 74–99)
METER GLUCOSE: 229 MG/DL (ref 74–99)
METER GLUCOSE: 58 MG/DL (ref 74–99)
METER GLUCOSE: 62 MG/DL (ref 74–99)
METER GLUCOSE: 68 MG/DL (ref 74–99)
METER GLUCOSE: 82 MG/DL (ref 74–99)
OPERATOR ID: 1661
PATIENT TEMP: 37 C
PDW BLD-RTO: 14.5 FL (ref 11.5–15)
PHOSPHORUS: 2.1 MG/DL (ref 2.5–4.5)
PLATELET # BLD: 208 E9/L (ref 130–450)
PMV BLD AUTO: 13.5 FL (ref 7–12)
POTASSIUM SERPL-SCNC: 3.5 MMOL/L (ref 3.5–5)
POTASSIUM SERPL-SCNC: 3.86 MMOL/L (ref 3.5–5)
RBC # BLD: 4.66 E12/L (ref 3.8–5.8)
SODIUM BLD-SCNC: 142 MMOL/L (ref 132–146)
SOURCE, BLOOD GAS: NORMAL
TIME ANALYZED: 1704
TOTAL PROTEIN: 6.6 G/DL (ref 6.4–8.3)
WBC # BLD: 27.2 E9/L (ref 4.5–11.5)

## 2020-03-22 PROCEDURE — 2000000000 HC ICU R&B

## 2020-03-22 PROCEDURE — 6370000000 HC RX 637 (ALT 250 FOR IP): Performed by: STUDENT IN AN ORGANIZED HEALTH CARE EDUCATION/TRAINING PROGRAM

## 2020-03-22 PROCEDURE — 85730 THROMBOPLASTIN TIME PARTIAL: CPT

## 2020-03-22 PROCEDURE — 2580000003 HC RX 258: Performed by: SURGERY

## 2020-03-22 PROCEDURE — 6370000000 HC RX 637 (ALT 250 FOR IP): Performed by: PHYSICIAN ASSISTANT

## 2020-03-22 PROCEDURE — 6360000002 HC RX W HCPCS: Performed by: STUDENT IN AN ORGANIZED HEALTH CARE EDUCATION/TRAINING PROGRAM

## 2020-03-22 PROCEDURE — 6360000002 HC RX W HCPCS: Performed by: PHYSICIAN ASSISTANT

## 2020-03-22 PROCEDURE — 6370000000 HC RX 637 (ALT 250 FOR IP): Performed by: NURSE PRACTITIONER

## 2020-03-22 PROCEDURE — 94660 CPAP INITIATION&MGMT: CPT

## 2020-03-22 PROCEDURE — 2580000003 HC RX 258: Performed by: PHYSICIAN ASSISTANT

## 2020-03-22 PROCEDURE — 2580000003 HC RX 258: Performed by: STUDENT IN AN ORGANIZED HEALTH CARE EDUCATION/TRAINING PROGRAM

## 2020-03-22 PROCEDURE — 82962 GLUCOSE BLOOD TEST: CPT

## 2020-03-22 PROCEDURE — 84132 ASSAY OF SERUM POTASSIUM: CPT

## 2020-03-22 PROCEDURE — 84100 ASSAY OF PHOSPHORUS: CPT

## 2020-03-22 PROCEDURE — 36415 COLL VENOUS BLD VENIPUNCTURE: CPT

## 2020-03-22 PROCEDURE — 82330 ASSAY OF CALCIUM: CPT

## 2020-03-22 PROCEDURE — 2700000000 HC OXYGEN THERAPY PER DAY

## 2020-03-22 PROCEDURE — 85027 COMPLETE CBC AUTOMATED: CPT

## 2020-03-22 PROCEDURE — 80053 COMPREHEN METABOLIC PANEL: CPT

## 2020-03-22 PROCEDURE — 83735 ASSAY OF MAGNESIUM: CPT

## 2020-03-22 PROCEDURE — 6370000000 HC RX 637 (ALT 250 FOR IP): Performed by: SURGERY

## 2020-03-22 RX ORDER — INSULIN GLARGINE 100 [IU]/ML
25 INJECTION, SOLUTION SUBCUTANEOUS 2 TIMES DAILY
Status: DISCONTINUED | OUTPATIENT
Start: 2020-03-22 | End: 2020-03-25

## 2020-03-22 RX ORDER — SODIUM CHLORIDE 9 MG/ML
INJECTION, SOLUTION INTRAVENOUS CONTINUOUS
Status: DISCONTINUED | OUTPATIENT
Start: 2020-03-22 | End: 2020-03-26

## 2020-03-22 RX ORDER — SODIUM CHLORIDE 0.9 % (FLUSH) 0.9 %
10 SYRINGE (ML) INJECTION PRN
Status: DISCONTINUED | OUTPATIENT
Start: 2020-03-22 | End: 2020-03-27 | Stop reason: HOSPADM

## 2020-03-22 RX ORDER — HEPARIN SODIUM (PORCINE) LOCK FLUSH IV SOLN 100 UNIT/ML 100 UNIT/ML
3 SOLUTION INTRAVENOUS PRN
Status: DISCONTINUED | OUTPATIENT
Start: 2020-03-22 | End: 2020-03-27 | Stop reason: HOSPADM

## 2020-03-22 RX ORDER — HEPARIN SODIUM (PORCINE) LOCK FLUSH IV SOLN 100 UNIT/ML 100 UNIT/ML
3 SOLUTION INTRAVENOUS EVERY 12 HOURS SCHEDULED
Status: DISCONTINUED | OUTPATIENT
Start: 2020-03-22 | End: 2020-03-24

## 2020-03-22 RX ORDER — INSULIN GLARGINE 100 [IU]/ML
35 INJECTION, SOLUTION SUBCUTANEOUS 2 TIMES DAILY
Status: DISCONTINUED | OUTPATIENT
Start: 2020-03-22 | End: 2020-03-22

## 2020-03-22 RX ORDER — LIDOCAINE HYDROCHLORIDE 10 MG/ML
5 INJECTION, SOLUTION EPIDURAL; INFILTRATION; INTRACAUDAL; PERINEURAL ONCE
Status: DISCONTINUED | OUTPATIENT
Start: 2020-03-22 | End: 2020-03-27 | Stop reason: HOSPADM

## 2020-03-22 RX ADMIN — PIPERACILLIN AND TAZOBACTAM 3.38 G: 3; .375 INJECTION, POWDER, LYOPHILIZED, FOR SOLUTION INTRAVENOUS at 05:10

## 2020-03-22 RX ADMIN — PREGABALIN 200 MG: 100 CAPSULE ORAL at 17:06

## 2020-03-22 RX ADMIN — ATORVASTATIN CALCIUM 10 MG: 10 TABLET, FILM COATED ORAL at 09:05

## 2020-03-22 RX ADMIN — APIXABAN 5 MG: 5 TABLET, FILM COATED ORAL at 09:05

## 2020-03-22 RX ADMIN — INSULIN GLARGINE 25 UNITS: 100 INJECTION, SOLUTION SUBCUTANEOUS at 21:00

## 2020-03-22 RX ADMIN — SPIRONOLACTONE 50 MG: 25 TABLET ORAL at 09:05

## 2020-03-22 RX ADMIN — PIPERACILLIN AND TAZOBACTAM 3.38 G: 3; .375 INJECTION, POWDER, LYOPHILIZED, FOR SOLUTION INTRAVENOUS at 17:05

## 2020-03-22 RX ADMIN — Medication 10 ML: at 21:24

## 2020-03-22 RX ADMIN — LUBIPROSTONE 24 MCG: 24 CAPSULE, GELATIN COATED ORAL at 09:05

## 2020-03-22 RX ADMIN — AMLODIPINE BESYLATE 10 MG: 10 TABLET ORAL at 09:33

## 2020-03-22 RX ADMIN — ASPIRIN 81 MG CHEWABLE TABLET 81 MG: 81 TABLET CHEWABLE at 09:05

## 2020-03-22 RX ADMIN — SODIUM CHLORIDE: 9 INJECTION, SOLUTION INTRAVENOUS at 01:46

## 2020-03-22 RX ADMIN — CLONIDINE HYDROCHLORIDE 0.1 MG: 0.1 TABLET ORAL at 09:05

## 2020-03-22 RX ADMIN — PANTOPRAZOLE SODIUM 40 MG: 40 TABLET, DELAYED RELEASE ORAL at 09:05

## 2020-03-22 RX ADMIN — METOPROLOL TARTRATE 100 MG: 50 TABLET, FILM COATED ORAL at 21:08

## 2020-03-22 RX ADMIN — LISINOPRIL 10 MG: 10 TABLET ORAL at 09:05

## 2020-03-22 RX ADMIN — OXYCODONE AND ACETAMINOPHEN 2 TABLET: 5; 325 TABLET ORAL at 19:38

## 2020-03-22 RX ADMIN — PIPERACILLIN AND TAZOBACTAM 3.38 G: 3; .375 INJECTION, POWDER, LYOPHILIZED, FOR SOLUTION INTRAVENOUS at 23:10

## 2020-03-22 RX ADMIN — VANCOMYCIN HYDROCHLORIDE 1250 MG: 10 INJECTION, POWDER, LYOPHILIZED, FOR SOLUTION INTRAVENOUS at 11:52

## 2020-03-22 RX ADMIN — SODIUM CHLORIDE: 9 INJECTION, SOLUTION INTRAVENOUS at 23:09

## 2020-03-22 RX ADMIN — VANCOMYCIN HYDROCHLORIDE 1250 MG: 10 INJECTION, POWDER, LYOPHILIZED, FOR SOLUTION INTRAVENOUS at 21:27

## 2020-03-22 RX ADMIN — APIXABAN 5 MG: 5 TABLET, FILM COATED ORAL at 21:06

## 2020-03-22 RX ADMIN — CLONIDINE HYDROCHLORIDE 0.1 MG: 0.1 TABLET ORAL at 17:05

## 2020-03-22 RX ADMIN — CILOSTAZOL 100 MG: 100 TABLET ORAL at 09:33

## 2020-03-22 RX ADMIN — CILOSTAZOL 100 MG: 100 TABLET ORAL at 21:08

## 2020-03-22 RX ADMIN — LUBIPROSTONE 24 MCG: 24 CAPSULE, GELATIN COATED ORAL at 17:09

## 2020-03-22 RX ADMIN — OXYCODONE AND ACETAMINOPHEN 2 TABLET: 5; 325 TABLET ORAL at 05:13

## 2020-03-22 RX ADMIN — DULOXETINE HYDROCHLORIDE 120 MG: 60 CAPSULE, DELAYED RELEASE ORAL at 09:05

## 2020-03-22 RX ADMIN — INSULIN LISPRO 2 UNITS: 100 INJECTION, SOLUTION INTRAVENOUS; SUBCUTANEOUS at 21:00

## 2020-03-22 RX ADMIN — METOPROLOL TARTRATE 100 MG: 50 TABLET, FILM COATED ORAL at 11:34

## 2020-03-22 RX ADMIN — PREGABALIN 200 MG: 100 CAPSULE ORAL at 21:06

## 2020-03-22 RX ADMIN — CLONIDINE HYDROCHLORIDE 0.1 MG: 0.1 TABLET ORAL at 21:08

## 2020-03-22 RX ADMIN — OXYCODONE AND ACETAMINOPHEN 2 TABLET: 5; 325 TABLET ORAL at 12:43

## 2020-03-22 ASSESSMENT — PAIN DESCRIPTION - PROGRESSION: CLINICAL_PROGRESSION: NOT CHANGED

## 2020-03-22 ASSESSMENT — PAIN DESCRIPTION - LOCATION: LOCATION: GROIN

## 2020-03-22 ASSESSMENT — PAIN SCALES - GENERAL
PAINLEVEL_OUTOF10: 9
PAINLEVEL_OUTOF10: 0
PAINLEVEL_OUTOF10: 8
PAINLEVEL_OUTOF10: 7
PAINLEVEL_OUTOF10: 0
PAINLEVEL_OUTOF10: 0

## 2020-03-22 ASSESSMENT — PAIN DESCRIPTION - ONSET: ONSET: ON-GOING

## 2020-03-22 ASSESSMENT — PAIN DESCRIPTION - DESCRIPTORS: DESCRIPTORS: ACHING;CRAMPING;SORE

## 2020-03-22 ASSESSMENT — PAIN DESCRIPTION - ORIENTATION: ORIENTATION: RIGHT

## 2020-03-22 ASSESSMENT — PAIN - FUNCTIONAL ASSESSMENT: PAIN_FUNCTIONAL_ASSESSMENT: ACTIVITIES ARE NOT PREVENTED

## 2020-03-22 ASSESSMENT — PAIN DESCRIPTION - FREQUENCY: FREQUENCY: INTERMITTENT

## 2020-03-22 ASSESSMENT — PAIN DESCRIPTION - PAIN TYPE: TYPE: SURGICAL PAIN

## 2020-03-22 NOTE — CONSULTS
Pharmacy Consultation Note  (Antibiotic Dosing and Monitoring)    Initial consult date: 20  Consulting physician: Rose Allen  Drug: Vancomycin  Indication: Cellulitis/Abscess    Age/  Gender Height Weight IBW Dosing weight  Allergy Information   64 y.o./male 6' 2\" (188 cm) 275 lb (124.7 kg)     Ideal body weight: 82.2 kg (181 lb 3.5 oz)  Adjusted ideal body weight: 99.2 kg (218 lb 11.7 oz)  99.2kg  Patient has no known allergies. Temp (24hrs), Av.9 °F (36.6 °C), Min:97 °F (36.1 °C), Max:99.2 °F (37.3 °C)          Date  WBC BUN SCr CrCl  (mL/min) Drug/Dose Time   Given Level(s)   (Time) Comments   3/20/20  22.2  32  1.6  67   Vancomycin 2000 mg IV x 1 dose   2130     3/21/20 21.7 27 1.6 67 Vancomycin 1250 mg IV q 12 hr 1123  2123 Vanco trough obtained at 0930 was 12.5 mcg/mL    3/22/20 27.2 25 1.9 57 Vancomycin 1250 mg IV q 12 hr (0900)  (2100)     3/23/20      (0900) Vanco trough at (0830)          Intake/Output Summary (Last 24 hours) at 3/22/2020 0926  Last data filed at 3/22/2020 0600  Gross per 24 hour   Intake 3809 ml   Output 1545 ml   Net 2264 ml       Historical Cultures:  Organism   Date Value Ref Range Status   10/24/2018 Staphylococcus aureus (A)  Corrected     Recent Labs     20  1754   BC 24 Hours- no growth       Cultures:  available culture and sensitivity results were reviewed in Epic  · Surgical cx: sent  · Blood, 3/20: 24 hr NG  · Urine, 3/20: <10,000 GNR     Assessment:  · 60 y.o. male has been initiated vancomycin and zosyn for critical ischemia of right extremity s/p AKA in 2018. Patient having R hip and testicular pain. Gas ganrene was found in the extremity in which patient had iliofemorall artery embolectomy and I&D of extremity and right scrotum on 3/20. PMH is significant for DM, HTN, and PVD.     · Estimated CrCl = 67 mL/min  · Goal trough level = 15-20 mcg/mL  · 3/22: Scr increased to 1.9, UOP 0.5 mL/kg/hr     Plan:  · Cont vancomycin 1250 mg IV q 12 hr   · Vanco

## 2020-03-22 NOTE — PROGRESS NOTES
Date: 3/21/2020    Time: 10:36 PM    Patient Placed On BIPAP/CPAP/ Non-Invasive Ventilation? Yes    If no must comment. Facial area red/color change? No           If YES are Blister/Lesion present? No   If yes must notify nursing staff  BIPAP/CPAP skin barrier?   Yes    Skin barrier type:mepilex       Comments:        Jaron Sheikh

## 2020-03-22 NOTE — PROGRESS NOTES
Subjective:    Chief complaint:    He has no specific complaints      Objective:    BP (!) 207/76   Pulse 70   Temp 97.6 °F (36.4 °C) (Oral)   Resp 20   Ht 6' 2\" (1.88 m)   Wt 275 lb (124.7 kg)   SpO2 95%   BMI 35.31 kg/m²   General : Awake ,alert,no distress. Heart:  RRR, no murmurs, gallops, or rubs.   Lungs:  CTA bilaterally, no wheeze, rales or rhonchi  Abd: bowel sounds present, nontender, nondistended, no masses  Extrem:  No clubbing, cyanosis, or edema    CBC:   Lab Results   Component Value Date    WBC 27.2 03/22/2020    RBC 4.66 03/22/2020    HGB 12.0 03/22/2020    HCT 37.9 03/22/2020    MCV 81.3 03/22/2020    MCH 25.8 03/22/2020    MCHC 31.7 03/22/2020    RDW 14.5 03/22/2020     03/22/2020    MPV 13.5 03/22/2020     BMP:    Lab Results   Component Value Date     03/22/2020    K 3.5 03/22/2020    K 4.9 03/20/2020     03/22/2020    CO2 22 03/22/2020    BUN 25 03/22/2020    LABALBU 2.7 03/22/2020    CREATININE 1.9 03/22/2020    CALCIUM 8.9 03/22/2020    GFRAA 44 03/22/2020    LABGLOM 44 03/22/2020    GLUCOSE 62 03/22/2020     PT/INR:    Lab Results   Component Value Date    PROTIME 14.6 03/20/2020    INR 1.3 03/20/2020     Troponin:    Lab Results   Component Value Date    TROPONINI 0.08 03/20/2020       Recent Labs     03/20/20 1754   LABURIN <10,000 CFU/mL  Gram negative rods       Recent Labs     03/20/20  1754   BC 24 Hours- no growth     Recent Labs     03/20/20 2024   BLOODCULT2 24 Hours- no growth         Current Facility-Administered Medications:     insulin lispro (HUMALOG) injection vial 0-18 Units, 0-18 Units, Subcutaneous, TID WC, Anuel Smith MD    insulin lispro (HUMALOG) injection vial 0-9 Units, 0-9 Units, Subcutaneous, Nightly, Anuel Smith MD    lidocaine PF 1 % injection 5 mL, 5 mL, Intradermal, Once, SHONDA Nava CNP    sodium chloride flush 0.9 % injection 10 mL, 10 mL, Intravenous, PRN, SHONDA Nava CNP    heparin flush 100 UNIT/ML injection 10 mL, 10 mL, Intravenous, PRN, LORRAINE Hayden    potassium chloride (KLOR-CON M) extended release tablet 40 mEq, 40 mEq, Oral, PRN **OR** potassium bicarb-citric acid (EFFER-K) effervescent tablet 40 mEq, 40 mEq, Oral, PRN **OR** potassium chloride 10 mEq/100 mL IVPB (Peripheral Line), 10 mEq, Intravenous, PRN, LORRAINE Hayden    acetaminophen (TYLENOL) tablet 650 mg, 650 mg, Oral, Q6H PRN **OR** acetaminophen (TYLENOL) suppository 650 mg, 650 mg, Rectal, Q6H PRN, LORRAINE Hayden    magnesium hydroxide (MILK OF MAGNESIA) 400 MG/5ML suspension 30 mL, 30 mL, Oral, Daily PRN, LORRAINE Hayden    promethazine (PHENERGAN) tablet 12.5 mg, 12.5 mg, Oral, Q6H PRN **OR** ondansetron (ZOFRAN) injection 4 mg, 4 mg, Intravenous, Q6H PRN, LORRAINE Hayden    oxyCODONE-acetaminophen (PERCOCET) 5-325 MG per tablet 1 tablet, 1 tablet, Oral, Q4H PRN **OR** oxyCODONE-acetaminophen (PERCOCET) 5-325 MG per tablet 2 tablet, 2 tablet, Oral, Q4H PRN, Harshal Ramachandran MD, 2 tablet at 03/22/20 0513    HYDROmorphone (DILAUDID) injection 0.5 mg, 0.5 mg, Intravenous, Q3H PRN **OR** HYDROmorphone (DILAUDID) injection 1 mg, 1 mg, Intravenous, Q3H PRN, Harshal Ramachandran MD, 1 mg at 03/21/20 2004    labetalol (NORMODYNE;TRANDATE) injection 10 mg, 10 mg, Intravenous, Q30 Min PRN, Harshal Ramachandran MD, 10 mg at 03/21/20 0857    hydrALAZINE (APRESOLINE) injection 10 mg, 10 mg, Intravenous, Q30 Min PRN, Harshal Ramachandran MD, 10 mg at 03/21/20 0902    0.9 % sodium chloride infusion, , Intravenous, Q8H, Harshal Ramachandran MD, Stopped at 03/22/20 0425    vancomycin (VANCOCIN) 1,250 mg in dextrose 5 % 250 mL IVPB, 1,250 mg, Intravenous, Q12H, Harshal Ramachandran MD, Stopped at 03/21/20 3924    pantoprazole (PROTONIX) tablet 40 mg, 40 mg, Oral, QAM AC, Nellie Hart MD, 40 mg at 03/22/20 0905    glucose (GLUTOSE) 40 % oral gel 15 g, 15 g, Oral, PRN, Nellie Hart MD    dextrose 50 % IV solution, 12.5 g, Intravenous, PRN, Nellie Hart, MD    glucagon (rDNA) injection 1 mg, 1 mg, Intramuscular, PRN, Marta Castro MD    dextrose 5 % solution, 100 mL/hr, Intravenous, PRN, Marta Castro MD    sodium chloride flush 0.9 % injection 10 mL, 10 mL, Intravenous, PRN, Judi Samuels MD, 10 mL at 03/20/20 1928    DIET CARB CONTROL; Carb Control: 4 carb choices (60 gms)/meal    CTA ABDOMINAL AORTA W BILAT RUNOFF W CONTRAST   Final Result   There is complete thrombotic occlusion of the origin of the right   common femoral artery with some gas in the proximal superficial   femoral artery in the upper thigh, with gas extending into the   quadriceps muscles of the upper thigh amputation stump. There is a fluid collection in the right hemiscrotal subcutaneous fat,   which is not associated with gangrenous gas or prominent scrotal wall   thickening. Atherosclerotic runoff findings are noted in the left leg, which has   arterial patency to the ankle, primarily in the posterior tibial and   peroneal distributions. ALERT:  THIS IS AN ABNORMAL REPORT-thrombosis of the common femoral   artery with gangrene in the upper thigh musculature. Note: Emergency department physician was contacted telephonically at   the time of this dictation to communicate findings. XR FEMUR RIGHT (MIN 2 VIEWS)   Final Result   1. Normal chest with improved aeration of the lungs. 2. Some gas is identified in the ventral 5 soft tissues along the   proximal femur, suggesting gas in venous structures and along fascial   planes. ALERT:  THIS IS AN ABNORMAL REPORT      XR CHEST PORTABLE   Final Result   1. Normal chest with improved aeration of the lungs. 2. Some gas is identified in the ventral 5 soft tissues along the   proximal femur, suggesting gas in venous structures and along fascial   planes.          ALERT:  THIS IS AN ABNORMAL REPORT          Assessment:    Principal Problem:    Critical lower limb ischemia  Active Problems:    DM II (diabetes mellitus, type II), controlled ([address]    HTN (hypertension)    Leukocytosis    Stage 3 chronic kidney disease (HCC)    HLD (hyperlipidemia)    Occlusion of common femoral artery (HCC)    Cellulitis, scrotum    Hyperglycemia due to type 2 diabetes mellitus (HCC)    Gas gangrene of thigh ([address]    Vascular occlusion  Resolved Problems:    * No resolved hospital problems. *      Plan:    lantus dose decreased already  PICC line  On magnoliaquis    Stuart Goins [facility]  10:35 AM  3/22/2020    NOTE: This report was transcribed using voice recognition software.  Every effort was made to ensure accuracy; however, inadvertent transcription errors may be present

## 2020-03-22 NOTE — PROGRESS NOTES
Attempted right upper arm picc with ultrasound and vps under sterile and max. Barrier prec. The picc line keeps coursing into right jugular system. The picc was removed and demonstrated a tortuous pathway. . the left arm was assessed. . there is a large infiltrated noted. Unable to identify appreciated veins for placement. RN notified.

## 2020-03-23 ENCOUNTER — APPOINTMENT (OUTPATIENT)
Dept: ULTRASOUND IMAGING | Age: 65
DRG: 270 | End: 2020-03-23
Payer: COMMERCIAL

## 2020-03-23 ENCOUNTER — ANESTHESIA EVENT (OUTPATIENT)
Dept: ICU | Age: 65
DRG: 270 | End: 2020-03-23
Payer: COMMERCIAL

## 2020-03-23 ENCOUNTER — ANESTHESIA (OUTPATIENT)
Dept: ICU | Age: 65
DRG: 270 | End: 2020-03-23
Payer: COMMERCIAL

## 2020-03-23 LAB
ALBUMIN SERPL-MCNC: 2.2 G/DL (ref 3.5–5.2)
ALP BLD-CCNC: 109 U/L (ref 40–129)
ALT SERPL-CCNC: 31 U/L (ref 0–40)
ANAEROBIC CULTURE: NORMAL
ANION GAP SERPL CALCULATED.3IONS-SCNC: 8 MMOL/L (ref 7–16)
AST SERPL-CCNC: 100 U/L (ref 0–39)
BILIRUB SERPL-MCNC: 0.4 MG/DL (ref 0–1.2)
BUN BLDV-MCNC: 25 MG/DL (ref 8–23)
CALCIUM IONIZED: 1.25 MMOL/L (ref 1.15–1.33)
CALCIUM SERPL-MCNC: 8.3 MG/DL (ref 8.6–10.2)
CHLORIDE BLD-SCNC: 100 MMOL/L (ref 98–107)
CO2: 24 MMOL/L (ref 22–29)
CREAT SERPL-MCNC: 1.9 MG/DL (ref 0.7–1.2)
EKG ATRIAL RATE: 69 BPM
EKG P AXIS: -61 DEGREES
EKG P-R INTERVAL: 200 MS
EKG Q-T INTERVAL: 462 MS
EKG QRS DURATION: 92 MS
EKG QTC CALCULATION (BAZETT): 495 MS
EKG R AXIS: 32 DEGREES
EKG T AXIS: 37 DEGREES
EKG VENTRICULAR RATE: 69 BPM
GFR AFRICAN AMERICAN: 44
GFR NON-AFRICAN AMERICAN: 44 ML/MIN/1.73
GLUCOSE BLD-MCNC: 197 MG/DL (ref 74–99)
HCT VFR BLD CALC: 34.2 % (ref 37–54)
HEMOGLOBIN: 11.2 G/DL (ref 12.5–16.5)
MAGNESIUM: 2 MG/DL (ref 1.6–2.6)
MCH RBC QN AUTO: 26.9 PG (ref 26–35)
MCHC RBC AUTO-ENTMCNC: 32.7 % (ref 32–34.5)
MCV RBC AUTO: 82.2 FL (ref 80–99.9)
METER GLUCOSE: 180 MG/DL (ref 74–99)
METER GLUCOSE: 225 MG/DL (ref 74–99)
METER GLUCOSE: 234 MG/DL (ref 74–99)
METER GLUCOSE: 242 MG/DL (ref 74–99)
METER GLUCOSE: 245 MG/DL (ref 74–99)
PDW BLD-RTO: 14.3 FL (ref 11.5–15)
PHOSPHORUS: 2.5 MG/DL (ref 2.5–4.5)
PLATELET # BLD: 170 E9/L (ref 130–450)
PMV BLD AUTO: 13.3 FL (ref 7–12)
POC ACT LR: 274 SECONDS
POTASSIUM SERPL-SCNC: 4.2 MMOL/L (ref 3.5–5)
RBC # BLD: 4.16 E12/L (ref 3.8–5.8)
SODIUM BLD-SCNC: 132 MMOL/L (ref 132–146)
T4 FREE: 1.1 NG/DL (ref 0.93–1.7)
TOTAL PROTEIN: 6 G/DL (ref 6.4–8.3)
TSH SERPL DL<=0.05 MIU/L-ACNC: 2.27 UIU/ML (ref 0.27–4.2)
VANCOMYCIN TROUGH: 24.3 MCG/ML (ref 5–16)
WBC # BLD: 18.4 E9/L (ref 4.5–11.5)

## 2020-03-23 PROCEDURE — 82330 ASSAY OF CALCIUM: CPT

## 2020-03-23 PROCEDURE — 82962 GLUCOSE BLOOD TEST: CPT

## 2020-03-23 PROCEDURE — APPSS60 APP SPLIT SHARED TIME 46-60 MINUTES: Performed by: NURSE PRACTITIONER

## 2020-03-23 PROCEDURE — 2140000000 HC CCU INTERMEDIATE R&B

## 2020-03-23 PROCEDURE — 6370000000 HC RX 637 (ALT 250 FOR IP): Performed by: NURSE PRACTITIONER

## 2020-03-23 PROCEDURE — 85027 COMPLETE CBC AUTOMATED: CPT

## 2020-03-23 PROCEDURE — 2580000003 HC RX 258: Performed by: STUDENT IN AN ORGANIZED HEALTH CARE EDUCATION/TRAINING PROGRAM

## 2020-03-23 PROCEDURE — 83735 ASSAY OF MAGNESIUM: CPT

## 2020-03-23 PROCEDURE — 97530 THERAPEUTIC ACTIVITIES: CPT

## 2020-03-23 PROCEDURE — 80053 COMPREHEN METABOLIC PANEL: CPT

## 2020-03-23 PROCEDURE — 99223 1ST HOSP IP/OBS HIGH 75: CPT | Performed by: INTERNAL MEDICINE

## 2020-03-23 PROCEDURE — 6370000000 HC RX 637 (ALT 250 FOR IP): Performed by: STUDENT IN AN ORGANIZED HEALTH CARE EDUCATION/TRAINING PROGRAM

## 2020-03-23 PROCEDURE — 6360000002 HC RX W HCPCS: Performed by: SPECIALIST

## 2020-03-23 PROCEDURE — 93010 ELECTROCARDIOGRAM REPORT: CPT | Performed by: INTERNAL MEDICINE

## 2020-03-23 PROCEDURE — 2580000003 HC RX 258: Performed by: SURGERY

## 2020-03-23 PROCEDURE — 93971 EXTREMITY STUDY: CPT

## 2020-03-23 PROCEDURE — 2500000003 HC RX 250 WO HCPCS: Performed by: SPECIALIST

## 2020-03-23 PROCEDURE — 97161 PT EVAL LOW COMPLEX 20 MIN: CPT

## 2020-03-23 PROCEDURE — 80202 ASSAY OF VANCOMYCIN: CPT

## 2020-03-23 PROCEDURE — 36415 COLL VENOUS BLD VENIPUNCTURE: CPT

## 2020-03-23 PROCEDURE — 84100 ASSAY OF PHOSPHORUS: CPT

## 2020-03-23 PROCEDURE — 6360000002 HC RX W HCPCS: Performed by: PHYSICIAN ASSISTANT

## 2020-03-23 PROCEDURE — 37799 UNLISTED PX VASCULAR SURGERY: CPT

## 2020-03-23 PROCEDURE — 6370000000 HC RX 637 (ALT 250 FOR IP): Performed by: PHYSICIAN ASSISTANT

## 2020-03-23 PROCEDURE — 84439 ASSAY OF FREE THYROXINE: CPT

## 2020-03-23 PROCEDURE — 2580000003 HC RX 258

## 2020-03-23 PROCEDURE — 2700000000 HC OXYGEN THERAPY PER DAY

## 2020-03-23 PROCEDURE — 97166 OT EVAL MOD COMPLEX 45 MIN: CPT

## 2020-03-23 PROCEDURE — 6360000002 HC RX W HCPCS

## 2020-03-23 PROCEDURE — 2580000003 HC RX 258: Performed by: PHYSICIAN ASSISTANT

## 2020-03-23 PROCEDURE — 94660 CPAP INITIATION&MGMT: CPT

## 2020-03-23 PROCEDURE — 6370000000 HC RX 637 (ALT 250 FOR IP): Performed by: SURGERY

## 2020-03-23 PROCEDURE — 2580000003 HC RX 258: Performed by: SPECIALIST

## 2020-03-23 PROCEDURE — 93005 ELECTROCARDIOGRAM TRACING: CPT | Performed by: INTERNAL MEDICINE

## 2020-03-23 PROCEDURE — 84443 ASSAY THYROID STIM HORMONE: CPT

## 2020-03-23 RX ORDER — TAMSULOSIN HYDROCHLORIDE 0.4 MG/1
0.4 CAPSULE ORAL DAILY
Status: DISCONTINUED | OUTPATIENT
Start: 2020-03-23 | End: 2020-03-27 | Stop reason: HOSPADM

## 2020-03-23 RX ORDER — METOPROLOL SUCCINATE 25 MG/1
25 TABLET, EXTENDED RELEASE ORAL DAILY
Status: DISCONTINUED | OUTPATIENT
Start: 2020-03-23 | End: 2020-03-23

## 2020-03-23 RX ADMIN — METOPROLOL TARTRATE 100 MG: 50 TABLET, FILM COATED ORAL at 10:31

## 2020-03-23 RX ADMIN — PANTOPRAZOLE SODIUM 40 MG: 40 TABLET, DELAYED RELEASE ORAL at 06:22

## 2020-03-23 RX ADMIN — PIPERACILLIN AND TAZOBACTAM 3.38 G: 3; .375 INJECTION, POWDER, LYOPHILIZED, FOR SOLUTION INTRAVENOUS at 05:27

## 2020-03-23 RX ADMIN — CLONIDINE HYDROCHLORIDE 0.1 MG: 0.1 TABLET ORAL at 13:32

## 2020-03-23 RX ADMIN — PIPERACILLIN AND TAZOBACTAM 3.38 G: 3; .375 INJECTION, POWDER, LYOPHILIZED, FOR SOLUTION INTRAVENOUS at 13:29

## 2020-03-23 RX ADMIN — CILOSTAZOL 100 MG: 100 TABLET ORAL at 08:42

## 2020-03-23 RX ADMIN — TAMSULOSIN HYDROCHLORIDE 0.4 MG: 0.4 CAPSULE ORAL at 08:41

## 2020-03-23 RX ADMIN — ATORVASTATIN CALCIUM 10 MG: 10 TABLET, FILM COATED ORAL at 08:41

## 2020-03-23 RX ADMIN — OXYCODONE AND ACETAMINOPHEN 2 TABLET: 5; 325 TABLET ORAL at 06:08

## 2020-03-23 RX ADMIN — CILOSTAZOL 100 MG: 100 TABLET ORAL at 20:29

## 2020-03-23 RX ADMIN — PREGABALIN 200 MG: 100 CAPSULE ORAL at 08:43

## 2020-03-23 RX ADMIN — APIXABAN 5 MG: 5 TABLET, FILM COATED ORAL at 08:41

## 2020-03-23 RX ADMIN — OXYCODONE AND ACETAMINOPHEN 2 TABLET: 5; 325 TABLET ORAL at 02:09

## 2020-03-23 RX ADMIN — OXYCODONE AND ACETAMINOPHEN 2 TABLET: 5; 325 TABLET ORAL at 20:41

## 2020-03-23 RX ADMIN — Medication 10 ML: at 09:59

## 2020-03-23 RX ADMIN — LISINOPRIL 10 MG: 10 TABLET ORAL at 08:42

## 2020-03-23 RX ADMIN — SODIUM CHLORIDE: 9 INJECTION, SOLUTION INTRAVENOUS at 11:29

## 2020-03-23 RX ADMIN — DEXTROSE MONOHYDRATE 1750 MG: 50 INJECTION, SOLUTION INTRAVENOUS at 20:30

## 2020-03-23 RX ADMIN — INSULIN LISPRO 3 UNITS: 100 INJECTION, SOLUTION INTRAVENOUS; SUBCUTANEOUS at 11:47

## 2020-03-23 RX ADMIN — INSULIN LISPRO 6 UNITS: 100 INJECTION, SOLUTION INTRAVENOUS; SUBCUTANEOUS at 16:48

## 2020-03-23 RX ADMIN — SODIUM CHLORIDE 25 ML: 9 INJECTION, SOLUTION INTRAVENOUS at 19:12

## 2020-03-23 RX ADMIN — CEFEPIME HYDROCHLORIDE 2 G: 2 INJECTION, POWDER, FOR SOLUTION INTRAVENOUS at 14:35

## 2020-03-23 RX ADMIN — SODIUM CHLORIDE: 9 INJECTION, SOLUTION INTRAVENOUS at 09:57

## 2020-03-23 RX ADMIN — APIXABAN 5 MG: 5 TABLET, FILM COATED ORAL at 20:30

## 2020-03-23 RX ADMIN — MICONAZOLE NITRATE 13 G: 20.6 POWDER TOPICAL at 21:03

## 2020-03-23 RX ADMIN — LUBIPROSTONE 24 MCG: 24 CAPSULE, GELATIN COATED ORAL at 08:44

## 2020-03-23 RX ADMIN — MICONAZOLE NITRATE 13 G: 20.6 POWDER TOPICAL at 18:06

## 2020-03-23 RX ADMIN — DULOXETINE HYDROCHLORIDE 120 MG: 60 CAPSULE, DELAYED RELEASE ORAL at 08:43

## 2020-03-23 RX ADMIN — CLONIDINE HYDROCHLORIDE 0.1 MG: 0.1 TABLET ORAL at 20:29

## 2020-03-23 RX ADMIN — SODIUM CHLORIDE: 9 INJECTION, SOLUTION INTRAVENOUS at 03:15

## 2020-03-23 RX ADMIN — PREGABALIN 200 MG: 100 CAPSULE ORAL at 20:30

## 2020-03-23 RX ADMIN — SPIRONOLACTONE 50 MG: 25 TABLET ORAL at 08:42

## 2020-03-23 RX ADMIN — AMLODIPINE BESYLATE 10 MG: 10 TABLET ORAL at 08:42

## 2020-03-23 RX ADMIN — INSULIN LISPRO 3 UNITS: 100 INJECTION, SOLUTION INTRAVENOUS; SUBCUTANEOUS at 20:31

## 2020-03-23 RX ADMIN — PREGABALIN 200 MG: 100 CAPSULE ORAL at 13:35

## 2020-03-23 RX ADMIN — INSULIN GLARGINE 25 UNITS: 100 INJECTION, SOLUTION SUBCUTANEOUS at 08:56

## 2020-03-23 RX ADMIN — INSULIN LISPRO 6 UNITS: 100 INJECTION, SOLUTION INTRAVENOUS; SUBCUTANEOUS at 07:13

## 2020-03-23 RX ADMIN — CLONIDINE HYDROCHLORIDE 0.1 MG: 0.1 TABLET ORAL at 08:42

## 2020-03-23 RX ADMIN — ASPIRIN 81 MG CHEWABLE TABLET 81 MG: 81 TABLET CHEWABLE at 08:41

## 2020-03-23 RX ADMIN — LUBIPROSTONE 24 MCG: 24 CAPSULE, GELATIN COATED ORAL at 16:50

## 2020-03-23 RX ADMIN — METOPROLOL TARTRATE 100 MG: 50 TABLET, FILM COATED ORAL at 20:30

## 2020-03-23 ASSESSMENT — PAIN SCALES - GENERAL
PAINLEVEL_OUTOF10: 0
PAINLEVEL_OUTOF10: 3
PAINLEVEL_OUTOF10: 0
PAINLEVEL_OUTOF10: 7
PAINLEVEL_OUTOF10: 9
PAINLEVEL_OUTOF10: 0
PAINLEVEL_OUTOF10: 0
PAINLEVEL_OUTOF10: 7
PAINLEVEL_OUTOF10: 0
PAINLEVEL_OUTOF10: 0

## 2020-03-23 ASSESSMENT — PAIN - FUNCTIONAL ASSESSMENT
PAIN_FUNCTIONAL_ASSESSMENT: ACTIVITIES ARE NOT PREVENTED

## 2020-03-23 ASSESSMENT — PAIN DESCRIPTION - ONSET
ONSET: ON-GOING

## 2020-03-23 ASSESSMENT — PAIN DESCRIPTION - ORIENTATION
ORIENTATION: RIGHT

## 2020-03-23 ASSESSMENT — PAIN DESCRIPTION - DESCRIPTORS
DESCRIPTORS: ACHING;SORE
DESCRIPTORS: ACHING;CRAMPING;SORE
DESCRIPTORS: ACHING;SORE

## 2020-03-23 ASSESSMENT — PAIN DESCRIPTION - LOCATION
LOCATION: GROIN

## 2020-03-23 ASSESSMENT — PAIN DESCRIPTION - PAIN TYPE
TYPE: SURGICAL PAIN

## 2020-03-23 ASSESSMENT — PAIN DESCRIPTION - PROGRESSION
CLINICAL_PROGRESSION: GRADUALLY WORSENING

## 2020-03-23 ASSESSMENT — LIFESTYLE VARIABLES: SMOKING_STATUS: 1

## 2020-03-23 ASSESSMENT — PAIN DESCRIPTION - FREQUENCY
FREQUENCY: INTERMITTENT

## 2020-03-23 NOTE — PROGRESS NOTES
Pharmacy Consultation Note  (Antibiotic Dosing and Monitoring)    Initial consult date: 2020  Consulting physician: Dr Celeste Sawyer  Drug: Vancomycin  Indication: R scrotal Abscess    Age/  Gender Height Weight IBW Dosing weight  Allergy Information   64 y.o./male 6' 2\" (188 cm) 275 lb (124.7 kg)     Ideal body weight: 82.2 kg (181 lb 3.5 oz)  Adjusted ideal body weight: 99.2 kg (218 lb 11.7 oz)  99.2kg  Patient has no known allergies. Temp (24hrs), Av.7 °F (36.5 °C), Min:96.9 °F (36.1 °C), Max:98.8 °F (37.1 °C)          Date  WBC BUN SCr CrCl  (mL/min) Drug/Dose Time   Given Level(s)   (Time) Comments   3/20/20  22.2  32  1.6  67   Vancomycin 2000 mg IV x 1 dose   2130     3/21/20 21.7 27 1.6 67 Vancomycin 1250 mg IV q 12 hr 1123  2123 Vanco trough obtained at 0930 was 12.5 mcg/mL    3/22/20 27.2 25 1.9 57 Vancomycin 1250 mg IV q 12 hr 1152  2127     3/23/20 18.4 25 1.9 57 Vancomycin 1750 mg IV q 24 hr <2100> Vanco trough 24.3 mcg/mL at 0820          Intake/Output Summary (Last 24 hours) at 3/23/2020 1106  Last data filed at 3/23/2020 0957  Gross per 24 hour   Intake 2960 ml   Output 975 ml   Net 1985 ml     Urine output over the last 24 hours: 0.2 mL/kg/hr (550 mL total) - incomplete documentation? Diuretics ordered in the last 24 hours: Spironolactone 50 mg PO qAM    Cultures:  available culture and sensitivity results were reviewed in Epic  3/20 Urine cx - <10,000 col gram positive cocci  3/20 Blood cx's - Prelim no growth  3/20 Surgical cx - Prelim growth present; evaluating for mixed gram positive organisms,     anaerobes not isolated    3/20 CTA ABDOMINAL AORTA W BILAT RUNOFF W CONTRAST - thrombosis of the common femoral  artery with gangrene in the upper thigh musculature    IV lines:  3/20 Arterial line R radial  3/20 Peripheral IV 20 g R wrist     Assessment:  · 60 y.o. male admitted 3/20 with critical ischemia of right lower extremity s/p AKA in .   Pt went to the OR with Vascular Surgery 3/20 s/p R groin exploration with iliofemoral artery and deep femoral artery embolectomy. Additionally, pt complaining of increased warmth and pain of his R scrotum s/p incision and drainage of R scrotum with general surgery  · Empiric antibiotics initiated - Piperacillin/tazobactam and Vancomycin day #4  · Vanco trough 24.3 mcg/mL today; Goal trough level = 15-20 mcg/mL  · SCr 1.9 again today    Plan:  · Adjust dose to Vancomycin 1750 mg IV q24h   · Will continue to order Vanco trough levels and will adjust dose as needed  · Will monitor renal function closely    Will continue to follow.      Halley Medrano, PharmD, BCPS, BCCCP  3/23/2020  11:32 AM  Pager: [phone]

## 2020-03-23 NOTE — CONSULTS
[address] [place] [address] [facility]    Consultation Note     Admit Date: 3/20/2020  4:49 PM    Reason for Consult:   Right thigh pain    Attending Physician:  Talya Arzate MD     Chief Complaint: Pain    HISTORY OF PRESENT ILLNESS:   The patient is a 60 y.o.   black man known to the Infectious Diseases service. The patient is  admitted through the emergency room with hip testicular and thigh pain. Patient is known history of atherosclerotic vascular disease with an AKA. Said a lot of surgery including bypass grafting on the right and he is a start diabetic at this time. Pain is been ongoing for couple weeks and have a school stump it was evaluated by vascular surgery and taken to the . At that time the right groin was explored there is an embolectomy performed in the iliofemoral artery as well as a deep femoral artery embolectomy. Right lower extremity was explore that for diagnostic purposes. Apparently the muscle was intact there is no fascial destruction. Because of swelling of the  the right testicle Penrose drain was placed. He has been afebrile through the admission he was started on Vanco and Zosyn. He does have renal insufficiency at this time.   White count on admission was 22,000 and hemoglobin 15 with a BUN and creatinine 25 and 1.9 cultures of the tissue showed mixed radha and urine had a few thousand gram-negative rods      10/24/2018 blood culture staph aureus MRSA  8/3/2018 right foot wound E. coli Klebsiella staph hemolyticus  524 2018 foot culture E faecalis  522 2018 foot culture E faecalis strep viridans staph epidermidis beta-lactamase positive        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 ([address] 5/22/2018    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 ([address] 5/21/2018    Critical lower limb ischemia 3/20/2020    Diabetes mellitus ([address]     No  Alcohol: No  Pets: No  Travel: No    Family History:       Problem Relation Age of Onset    Cancer Mother     Diabetes Father     Heart Failure Father     Hypertension Father     Cancer Sister    . Otherwise non-pertinent to the chief complaint. REVIEW OF SYSTEMS:    CONSTITUTIONAL:  No chills, fevers or night sweats. No loss of weight. EYES:  No double vision or drainage from eyes, ears or throat. HEENT:  No neck stiffness. No dysphagia. No drainage from eyes, ears or throat  RESPIRATORY:  No cough, productive sputum or hemoptysis. CARDIOVASCULAR:  No chest pain, palpitations, orthopnea or dyspnea on exertion. GASTROINTESTINAL:  No nausea, vomiting, diarrhea or constipation or hematochezia   GENITOURINARY:  No frequency burning dysuria or hematuria. INTEGUMENT/BREAST:  No rash or breast masses. HEMATOLOGIC/LYMPHATIC:  No lymphadenopathy or blood dyscrasics. ALLERGIC/IMMUNOLOGIC:  No anaphylaxis. ENDOCRINE:  No polyuria or polydipsia or temperature intolerance. MUSCULOSKELETAL: AKA on the right see history of present illness  NEUROLOGICAL:  No focal motor sensory deficit. BEHAVIOR/PSYCH:  No psychosis. PHYSICAL EXAM:    Vitals:    BP (!) 178/72   Pulse 77   Temp 97.7 °F (36.5 °C) (Temporal)   Resp 9   Ht 6' 2\" (1.88 m)   Wt 275 lb (124.7 kg)   SpO2 94%   BMI 35.31 kg/m²   Constitutional: The patient is awake, alert, and oriented. Skin: Warm and dry. No rashes were noted. No jaundice. HEENT: Eyes show round, and reactive pupils. Moist mucous membranes, no ulcerations, no thrush. Neck: Supple to movements. No lymphadenopathy. Chest: No use of accessory muscles to breathe. Symmetrical expansion. Auscultation reveals no wheezing, crackles, or rhonchi. Cardiovascular: S1 and S2 are rhythmic and regular. No murmurs appreciated. Abdomen: Positive bowel sounds to auscultation. Benign to palpation. No masses felt. No hepatosplenomegaly.   Genitourinary: Male; patient has a Penrose drain in the right testicle. Although tender scrotal area and testicle on the right that does not appear to be an infectious process ongoing such as [place]  Extremities: No clubbing, no cyanosis, no edema. Musculoskeletal: Asymmetrical with AKA on the right, significant amount of swelling warmth is noted as opposed to the admitting description of coolness in the AKA. Both surgical incisions look clean there is some macerated tissue in the intertriginous areas of the right groin  Neurological: No focal motor or sensory deficit  Lines: peripheral      CBC+dif:  Recent Labs     03/20/20  1754  03/21/20  0546 03/22/20  0410 03/23/20  0415   WBC 22.2*   < > 21.7* 27.2* 18.4*   HGB 15.0   < > 13.5 12.0* 11.2*   HCT 46.5   < > 41.0 37.9 34.2*   MCV 79.1*   < > 80.7 81.3 82.2      < > 179 208 170   NEUTROABS 19.26*  --   --   --   --     < > = values in this interval not displayed.      Lab Results   Component Value Date    CRP 10.6 (H) 10/24/2018    CRP 2.1 (H) 06/25/2018    CRP 7.4 (H) 06/11/2018     No results found for: CRPHS  Lab Results   Component Value Date    SEDRATE 128 (H) 10/24/2018    SEDRATE 77 (H) 06/25/2018    SEDRATE 138 (H) 06/11/2018     Lab Results   Component Value Date    ALT 31 03/23/2020     (H) 03/23/2020    ALKPHOS 109 03/23/2020    BILITOT 0.4 03/23/2020     Lab Results   Component Value Date     03/23/2020    K 4.2 03/23/2020    K 4.9 03/20/2020     03/23/2020    CO2 24 03/23/2020    BUN 25 03/23/2020    CREATININE 1.9 03/23/2020    GFRAA 44 03/23/2020    LABGLOM 44 03/23/2020    GLUCOSE 197 03/23/2020    PROT 6.0 03/23/2020    LABALBU 2.2 03/23/2020    CALCIUM 8.3 03/23/2020    BILITOT 0.4 03/23/2020    ALKPHOS 109 03/23/2020     03/23/2020    ALT 31 03/23/2020       Lab Results   Component Value Date    PROTIME 14.6 03/20/2020    INR 1.3 03/20/2020       Lab Results   Component Value Date    TSH 2.270 03/23/2020       Lab Results   Component Value ABNORMAL REPORT      US DUP UPPER EXTREMITY LEFT VENOUS    (Results Pending)       Microbiology:  Pending  Recent Labs     03/20/20  1754   BC 24 Hours- no growth     No results for input(s): ORG in the last 72 hours. Recent Labs     03/20/20 2024   BLOODCULT2 24 Hours- no growth     No results for input(s): STREPNEUMAGU in the last 72 hours. No results for input(s): LP1UAG in the last 72 hours. No results for input(s): ASO in the last 72 hours. No results for input(s): CULTRESP in the last 72 hours. Assessment:  · Hemic right lower extremity with occlusive processes occurring Quiring embolectomy  · Scrotal component may have just been congestion as opposed to infection    Plan:    · Cont Vanco and cefepime; combination of Vanco and Zosyn may through this patient into acute kidney injury. · Check cultures  · Baseline ESR, CRP  · Monitor labs  · Will follow with you    Thank you for having us see this patient in consultation. I will be discussing this case with the treating physicians.       Electronically signed by Olga Ley MD on 3/23/2020 at 1:58 PM

## 2020-03-23 NOTE — ANESTHESIA PRE PROCEDURE
Q4H PRN Rae Johnson MD   2 tablet at 03/23/20 9358    labetalol (NORMODYNE;TRANDATE) injection 10 mg  10 mg Intravenous Q30 Min PRN Rae Johnson MD   10 mg at 03/21/20 0857    hydrALAZINE (APRESOLINE) injection 10 mg  10 mg Intravenous Q30 Min PRN Rea Johnson MD   10 mg at 03/21/20 0902    0.9 % sodium chloride infusion   Intravenous Elias Peters MD   Stopped at 03/23/20 1157    pantoprazole (PROTONIX) tablet 40 mg  40 mg Oral QAM AC Javier Rojas MD   40 mg at 03/23/20 0622    glucose (GLUTOSE) 40 % oral gel 15 g  15 g Oral PRN Javier Rojas MD        dextrose 50 % IV solution  12.5 g Intravenous PRN Javier Rojas MD        glucagon (rDNA) injection 1 mg  1 mg Intramuscular PRN Javier Rojas MD        dextrose 5 % solution  100 mL/hr Intravenous PRN Javier Rojas MD        sodium chloride flush 0.9 % injection 10 mL  10 mL Intravenous PRN Rae Johnson MD   10 mL at 03/20/20 1928       Allergies:  No Known Allergies    Problem List:    Patient Active Problem List   Diagnosis Code    DM II (diabetes mellitus, type II), controlled ([facility] E11.9    HTN (hypertension) I10    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) I70.65    Coagulopathy (HCC) D68.9    Moderate protein-calorie malnutrition (HCC) E44.0    PVD (peripheral vascular disease) ([facility] I73.9    Leukocytosis D72.829    Stage 3 chronic kidney disease ([facility] N18.3    Tobacco dependence F17.200    HLD (hyperlipidemia) E78.5    History of DVT (deep vein thrombosis) Z86.718    Osteomyelitis (HCC) M86.9    S/P AKA (above knee amputation), right ([facility] Z89.611    History of vascular surgery Z98.890    Occlusion of common femoral artery ([facility] I70.209    Cellulitis, scrotum N49.2    Hyperglycemia due to type 2 diabetes mellitus ([facility] E11.65    Critical lower limb ischemia I99.8    Gas gangrene of thigh ([facility] A48.0    Vascular occlusion I99.8       Past Medical History:        Diagnosis Date  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 ([facility] 5/22/2018    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 ([facility] 5/21/2018    Critical lower limb ischemia 3/20/2020    Diabetes mellitus ([address]     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 2 diabetes mellitus, with fat layer exposed ([address] 5/22/2018    DVT, lower extremity (HCC)     right leg     Gas gangrene of thigh ([address] 3/20/2020    Hyperlipidemia     Hypertension     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[address]     PVD (peripheral vascular disease) ([facility]        Past Surgical History:        Procedure Laterality Date    FEMORAL BYPASS      [facility] [place] 3/20/2020    RIGHT LOWER EXTREMITY THROMBECTOMY, POSSIBLE ANGIOGRAM, POSSIBLE INTERVENTION, POSSIBLE BYPASS. performed by Garrick Cardenas MD at [facility] Right 11/1/2018    AMPUTATION ABOVE KNEE RIGHT LEG performed by Garrick Cardenas MD at [facility] Right 5/24/2018    RIGHT FOOT INCISION AND DRAINAGE WITH PARTIAL BONE RESECTION performed by Harvey Gallagher DPM at [facility] OFFICE/OUTPT VISIT,PROCEDURE ONLY Right 8/3/2018    INCISION AND DRAINAGE MULTIPLE AREAS RIGHT FOOT WITH DEBRIDEMENT SOFT TISSUE performed by Harvey Gallagher DPM at [place] [address] Right     leg        Social History:    Social History     Tobacco Use    Smoking status: Current Every Day Smoker     Packs/day: 0.50     Years: 7.00     Pack years: 3.50     Types: Cigarettes    Smokeless tobacco: Never Used    Tobacco comment: 5-7 a day    Substance Use Topics    Alcohol use: No                                Ready to quit: Not Answered  Counseling given: Not Answered  Comment: 5-7 a day       Vital Signs (Current): There were no vitals filed for this visit.                                            BP Readings from Last 3 Encounters: 03/23/20 (!) 129/46   03/20/20 (!) 104/57   01/20/20 (!) 154/82       NPO Status:  >8 hrs                                                                               BMI:   Wt Readings from Last 3 Encounters:   07/11/19 275 lb (124.7 kg)   06/14/19 275 lb (124.7 kg)   12/17/18 299 lb (135.6 kg)     There is no height or weight on file to calculate BMI.    CBC:   Lab Results   Component Value Date    WBC 18.4 03/23/2020    RBC 4.16 03/23/2020    HGB 11.2 03/23/2020    HCT 34.2 03/23/2020    MCV 82.2 03/23/2020    RDW 14.3 03/23/2020     03/23/2020       CMP:   Lab Results   Component Value Date     03/23/2020    K 4.2 03/23/2020    K 4.9 03/20/2020     03/23/2020    CO2 24 03/23/2020    BUN 25 03/23/2020    CREATININE 1.9 03/23/2020    GFRAA 44 03/23/2020    LABGLOM 44 03/23/2020    GLUCOSE 197 03/23/2020    PROT 6.0 03/23/2020    CALCIUM 8.3 03/23/2020    BILITOT 0.4 03/23/2020    ALKPHOS 109 03/23/2020     03/23/2020    ALT 31 03/23/2020       POC Tests: No results for input(s): POCGLU, POCNA, POCK, POCCL, POCBUN, POCHEMO, POCHCT in the last 72 hours.     Coags:   Lab Results   Component Value Date    PROTIME 14.6 03/20/2020    INR 1.3 03/20/2020    APTT 68.6 03/22/2020       HCG (If Applicable): No results found for: PREGTESTUR, PREGSERUM, HCG, HCGQUANT     ABGs:   Lab Results   Component Value Date    YWR4AXJ 22.8 03/20/2020        Type & Screen (If Applicable):  No results found for: ADILSON [facility]    Anesthesia Evaluation  Patient summary reviewed and Nursing notes reviewed no history of anesthetic complications:   Airway: Mallampati: Unable to assess / NA        Dental:          Pulmonary: breath sounds clear to auscultation  (+) pneumonia (Legionnaire Pneumonia):  sleep apnea: on CPAP,  current smoker                           Cardiovascular:  Exercise tolerance: poor (<4 METS),   (+) hypertension:, hyperlipidemia        Rhythm: regular  Rate: normal    Stress test reviewed  Cleared by cardiology     Beta Blocker:  Not on Beta Blocker      ROS comment: HLD     Neuro/Psych:   Negative Neuro/Psych ROS              GI/Hepatic/Renal:   (+) renal disease: CRI, morbid obesity          Endo/Other:    (+) DiabetesType II DM, poorly controlled, using insulin, blood dyscrasia: anticoagulation therapy:., .          Pt had no PAT visit        ROS comment: GRAM POSITIVE CULTURE SURGICAL WOUND  R scrotal swelling and infection. Gas Gangrene Left Thigh. Diabetic Ulcer. Abdominal:   (+) obese,         Vascular:   + PVD, aortic or cerebral, DVT, .        ROS comment: RLE ischemia, s/p iliofem artery embolectomy 3/21/20  PAD, s/p R AKA. information found for this patient   Narrative   Patient MRN: [id number]   : 1955   Age:  60 years   Gender: Male   Order Date: 3/20/2020 5:30 PM       Exam: XR CHEST PORTABLE, XR FEMUR RIGHT (MIN 2 VIEWS)       AP PORTABLE UPRIGHT CHEST:       Number of Images: 1 view       Indication:  Abnormal physical exam findings as above the knee   amputation stump       Comparison: Previous anterior upright chest 2018       Findings: The lungs are symmetrically expanded, and are clear. There is no   evidence of pneumothorax or pleural effusion. Cardiovascular shadows are normal in appearance. There is no evidence   of cardiac enlargement or decompensation. Skeletal structures show no evidence of acute pathology. RIGHT FEMUR:       NUMBER OF IMAGES:  4 views       INDICATION: \"Cold\" amputation stump of right AKA amputation        COMPARISON: None       FINDINGS:    Linear gas densities in the ventral and lateral thigh soft tissues   along the proximal femur are of concern regarding gas in venous   structures. There is no diffuse gaseous to suggest gangrene   distribution throughout the tissues. Infarction is a differential   diagnostic consideration.        The proximal femur and amputation stump margin appear normal.               Impression   1. Normal chest with improved aeration of the lungs. 2. Some gas is identified in the ventral 5 soft tissues along the   proximal femur, suggesting gas in venous structures and along fascial   planes. ALERT:  THIS IS AN ABNORMAL REPORT            Anesthesia Plan      MAC     ASA 3       Induction: intravenous. Anesthetic plan and risks discussed with patient. Use of blood products discussed with patient whom. Plan discussed with CRNA.     Attending anesthesiologist reviewed and agrees with Corie Maya MD   3/23/2020

## 2020-03-23 NOTE — CONSULTS
Patient seen and examined. Chart, labs, imaging studies, EKG and rhythm strips reviewed. Full consult to follow. We were asked to see the patient for new onset Atrial Fibrillation. IMPRESSION:  1. There is no documentation of A Fib. Some of the rhythm strips showed SR with artifact. Other rhythm strips do not belong to patient (and showed WCT/VT). 2. Right scrotal swelling/infection/s/p I&D   3. Acute right lower extremity limb ischemia s/p Right iliofemoral artery embolectomy with primary closure of arteriotomy. Right deep femoral artery embolectomy 03/21/2020  4. Anticoagulated on Eliquis   5. Echo 2018: Mild LVH, Normal EF, and no significant valvular abnormalities  6. Negative stress test in 05/2018  7. HTN  8. HLD  9. DM   10. CKD   11. Morbid obesity   12. RAGHU, on CPAP  13. Tobacco abuse   14. PAD s/p right AKA 11/2018  15. Hx of RLE DVT in 2018 treated at that time with Coumadin    16. Leukocytosis, trending down   17. Hyponatremia, resolved   18. Basically wheelchair bound     PLAN:   1. Continue current cardiac medications ( including oral anticoagulation and BB therapy)  2. Continue to monitor while in the hospital   3. No advanced cardiac testing needed or planned   4. Cardiology will sign off. Please call if needed or if A Fib is actually documented. 5. Importance of risk factor modification emphasized     Electronically signed by Shirley Anne MD on 3/23/2020 at 10:47 AM       Addendum:  I was informed by Dr. Ofelia Alvarado that the patient was definitely in Afib when he saw him in the ED. Same recommendations for now. Patient is to be on oral anticoagulation anyway for 6 months. Consider a 1 month event monitor at the end of that period ( or a [employer] loop recorder ) prior to discontinuing oral anticoagulation if no recurrent a fib is documented in the interim. If A fib is documented either way then he has to be on oral anticoagulation indefinitely.     Electronically signed by Martita Madrid

## 2020-03-23 NOTE — CONSULTS
Inpatient Cardiology Consultation      Reason for Consult:  New onset Atrial Fibrillation     Consulting Physician: Dr. Randy Benoit     Requesting Physician:  Dr. Blanca Donahue     Date of Consultation: 3/23/2020    HISTORY OF PRESENT ILLNESS:   Mr. Storm Salinas is a 60year old male who was seen in initial consultation with Dr. Laurel Rebolledo on 05/26/2018. His medical history includes HTN, HLD, DM, CKD, morbid obesity, tobacco abuse, Hx of RLE DVT with Coumadin use, and PVD s/p right AKA in 11/2018.  presented to [facility] ED on 03/20/2020 with complaints of right groin and right stump pain. He states that prior to presentation, he was having right groin and right stump edema and pain for the past 9-10 days. He denies erythema, fever and chills. States that he is mainly wheelchair bound. Denies chest pain, orthopnea and PND. Upon arrival to the ED his VS were 98.2-96-/91-97% on RA. EKG SR. WBC 22.2. H&H 15/46.5. BUN/SCr 34/1.6. Na 130. Troponin 0.08. ProBNP 3315. Lactic Acid 2.2. CTA of the abdomen with complete thrombotic occlusion of the origin of the right common femoral artery with some gas in the proximal superficial femoral artery in the upper thigh, with gas extending into the quadriceps muscles of the upper thigh amputation stump. There is a fluid collection in the right hemiscrotal subcutaneous fat, which is not associated with gangrenous gas or prominent scrotal wall thickening. Atherosclerotic runoff findings are noted in the left leg, which has arterial patency to the ankle, primarily in the posterior tibial and peroneal distributions. Vascular Surgery was consulted. He underwent a right groin exploration. Right iliofemoral artery embolectomy with primary closure of arteriotomy. Right deep femoral artery embolectomy. Right lower extremity diagnostic incision evaluation on 03/20/2020 and he also underwent a right scrotal incision and drainage on 03/20/2020. He was admitted to SICU.  Telemetry monitoring SR with PACs. Cardiology was consulted by Dr. Oseas Corona for management of new onset Atrial Fibrillation. Please note: past medical records were reviewed per electronic medical record (EMR) - see detailed reports under Past Medical/ Surgical History. Past Medical and Surgical History:    1. Echocardiogram 05/24/18 [facility]): Left ventricular internal dimensions were normal in diastole and systole. Mild left ventricular concentric hypertrophy noted. No regional wall motion abnormalities seen. Normal left ventricular ejection fraction. Ejection fraction is visually estimated at 65%. Aortic valve leaflets are somewhat thickened  2. Lexiscan MPS 05/27/2018: Nonischemic. No WMA. EF 61%. 3. JUSTO 10/29/2018 (Dr. Hugo Muhammad): No valvular vegetation. Normal left ventricular systolic function. Ejection fraction is visually estimated at > 60%. Normal right ventricular function. No evidence of a left atrial appendage thrombus. No evidence of interatrial shunting on bubble study. Mild mitral regurgitation. 4. HTN  5. HLD  6. DM  7. Morbid Obesity   8. Tobacco abuse   9. RAGHU on Cpap   10. CKD  11. Depression   12. Hx of Anemia   13. Hx of RLE DVT with Coumadin therapy   14. Hx of Legionnaire's Disease   15. S/p Right femoral to below the knee bypass with reverse SVG on 08/08/2005 (Dr. Derek Armstrong, [address] [place] [address])  12. S/p Revision of right femoral popliteal bypass graft with 8mm polytetrafluoroethylene thrombectomy of popliteal artery, completion of popliteal arteriogram 08/12/2005 ([facility])  17. Osteomyelitis and abscess of right foot S/p Incision and drainage, multiple areas of the right foot. Partial resection of the right fourth metatarsal and toe 05/24/2018 (Dr. Walter Henry)  25. RLE Tissue Loss S/p Right lower extremity angiogram with catheter at common iliac artery. Angiogram with catheter in the right common femoral and superficial femoral artery 05/25/2018 (Dr. Michelle Wolf)  23.  Abscess with necrotic tissue of the right foot S/p Incision and drainage of multiple areas of the left foot with excision of soft tissue including subcutaneous tissue, tendon, muscle and plantar fascia 08/03/2018 (Dr. Aye Alex)  21. Chronic RLE Wound S/p Right AKA 11/01/2018 (Dr. Katie Melchor)  21. Right scrotal abscess S/p Right scrotal incision and drainage 03/20/2020 (Dr. Portia Hatch)  25. Acute RLE limb ischemia S/p Right groin exploration. Right iliofemoral artery embolectomy with primary closure of arteriotomy. Right deep femoral artery embolectomy. Right lower extremity diagnostic incision evaluation 03/20/2020 (Dr. Katie Melchor)         Medications Prior to admit:  Prior to Admission medications    Medication Sig Start Date End Date Taking? Authorizing Provider   ONE TOUCH ULTRA TEST strip USE TO TEST BLOOD SUGARS DAILY AS NEEDED 3/9/20   Juanito Kline, DO   HYDROcodone-acetaminophen (NORCO) 5-325 MG per tablet Take 1 tablet by mouth every 6 hours as needed for Pain for up to 30 days. Intended supply: 30 days  For breakthrough 3/6/20 4/5/20  Juanito Kline, DO   oxyCODONE ER 13.5 MG C12A Take 13.5 mg by mouth 2 times daily for 30 days. 3/6/20 4/5/20  Juanito Kline, DO   DULoxetine (CYMBALTA) 60 MG extended release capsule TAKE 2 CAPSULES BY MOUTH EVERY MORNING 2/4/20   Juanito Kline, DO   cloNIDine (CATAPRES) 0.1 MG tablet TAKE 1 TABLET BY MOUTH 3 TIMES A DAY 2/4/20   Juanito Kline, DO   amLODIPine (NORVASC) 10 MG tablet TAKE ONE TABLET BY MOUTH EVERY MORNING - HOLD IF SBP<140 2/4/20   Juanito Kline, DO   LANTUS 100 UNIT/ML injection vial INJECT 15 UNITS INTO THE SKIN TWO TIMES A DAY 2/4/20   Juanito Kline, DO   lubiprostone (AMITIZA) 24 MCG capsule Take 1 capsule by mouth 2 times daily (with meals) 1/20/20   Juanito Kline, DO   metoprolol (LOPRESSOR) 100 MG tablet TAKE 1 TABLET BY MOUTH 2 TIMES A DAY 1/8/20   Juanito Kline, DO   HYDROcodone-acetaminophen (NORCO) 5-325 MG per tablet Take 1 tablet by mouth every 4 hours as needed for Pain for up to 30 days. 1/4/20 2/3/20  Juanito Kline,    insulin lispro (HUMALOG) 100 UNIT/ML injection vial Inject 5 Units into the skin 3 times daily (with meals) 11/11/19   Juanito Kline, DO   Handicap Placard MISC by Does not apply route Patient cannot walk 200 ft without stopping to rest.    Expiration 10/21/2024 10/21/19   Juanito Kline DO   pregabalin (LYRICA) 300 MG capsule Take 1 capsule by mouth 2 times daily for 30 days. 10/11/19 11/10/19  Juanito Kline, DO   naloxone 4 MG/0.1ML LIQD nasal spray 1 spray by Nasal route as needed for Opioid Reversal 9/10/19   Juanito Kline, DO   naloxone 4 MG/0.1ML LIQD nasal spray 1 spray by Nasal route as needed for Opioid Reversal  Patient not taking: Reported on 10/21/2019 7/12/19   Juanito Kline, DO   LANCETS MICRO THIN 24A MISC 1 applicator by Does not apply route daily 6/14/19   Juanito Kline, DO   Insulin Syringe-Needle U-100 30G X 5/16\" 0.3 ML MISC 1 box by Does not apply route 5 times daily 5/30/19   Juanito Kline, DO   ferrous sulfate 325 (65 Fe) MG tablet Take 1 tablet by mouth 2 times daily (with meals)  Patient not taking: Reported on 10/21/2019 11/14/18   Aubrie Ferris MD   lidocaine (LIDODERM) 5 % Place 1 patch onto the skin daily 12 hours on, 12 hours off. 11/14/18   Aubrie Ferris MD   aspirin 81 MG chewable tablet Take 1 tablet by mouth daily 8/8/18   Devang Green MD   lisinopril (PRINIVIL;ZESTRIL) 10 MG tablet Take 1 tablet by mouth daily Hold if sbp<140 8/7/18   Devang Green MD   cilostazol (PLETAL) 100 MG tablet Take 100 mg by mouth 2 times daily    Historical Provider, MD   tiZANidine (ZANAFLEX) 4 MG tablet Take 4 mg by mouth every 8 hours as needed     Historical Provider, MD   pregabalin (LYRICA) 100 MG capsule Take 200 mg by mouth 3 times daily. Kiera Rust     Historical Provider, MD   spironolactone (ALDACTONE) 50 MG tablet Take 50 mg by mouth every morning     Historical Provider, MD   atorvastatin (LIPITOR) 10 MG tablet Take 10 mg by mouth daily     Historical Provider, MD   esomeprazole (NEXIUM) 40 MG delayed release capsule Take 40 mg by mouth every morning (before breakfast)    Historical Provider, MD       Current Medications:    Current Facility-Administered Medications: tamsulosin (FLOMAX) capsule 0.4 mg, 0.4 mg, Oral, Daily  insulin lispro (HUMALOG) injection vial 0-18 Units, 0-18 Units, Subcutaneous, TID WC  insulin lispro (HUMALOG) injection vial 0-9 Units, 0-9 Units, Subcutaneous, Nightly  lidocaine PF 1 % injection 5 mL, 5 mL, Intradermal, Once  sodium chloride flush 0.9 % injection 10 mL, 10 mL, Intravenous, PRN  heparin flush 100 UNIT/ML injection 300 Units, 3 mL, Intravenous, 2 times per day  heparin flush 100 UNIT/ML injection 300 Units, 3 mL, Intracatheter, PRN  apixaban (ELIQUIS) tablet 5 mg, 5 mg, Oral, BID  insulin glargine (LANTUS) injection vial 25 Units, 25 Units, Subcutaneous, BID  0.9 % sodium chloride infusion, , Intravenous, Continuous  amLODIPine (NORVASC) tablet 10 mg, 10 mg, Oral, Daily  aspirin chewable tablet 81 mg, 81 mg, Oral, Daily  atorvastatin (LIPITOR) tablet 10 mg, 10 mg, Oral, Daily  cilostazol (PLETAL) tablet 100 mg, 100 mg, Oral, BID  cloNIDine (CATAPRES) tablet 0.1 mg, 0.1 mg, Oral, TID  DULoxetine (CYMBALTA) extended release capsule 120 mg, 120 mg, Oral, QAM  lisinopril (PRINIVIL;ZESTRIL) tablet 10 mg, 10 mg, Oral, Daily  lubiprostone (AMITIZA) capsule 24 mcg, 24 mcg, Oral, BID WC  metoprolol tartrate (LOPRESSOR) tablet 100 mg, 100 mg, Oral, BID  pregabalin (LYRICA) capsule 200 mg, 200 mg, Oral, TID  spironolactone (ALDACTONE) tablet 50 mg, 50 mg, Oral, QAM  piperacillin-tazobactam (ZOSYN) 3.375 g in dextrose 5 % 100 mL IVPB extended infusion (mini-bag), 3.375 g, Intravenous, Q8H  sodium chloride flush 0.9 % injection 10 mL, 10 mL, Intravenous, 2 times per day  sodium chloride flush 0.9 % injection 10 mL, 10 mL, Intravenous, PRN  potassium chloride (KLOR-CON M) extended release tablet 40 mEq, 40 mEq, Oral, PRN **OR** potassium complains of right scrotal and right stump edema. · Neurological: Denies dizziness and lightheadedness, complains of right stump numbness and tingling  · Cardiovascular: Denies chest pain, palpitations, and feelings of heart racing. · Respiratory: Denies orthopnea and PND  · Gastrointestinal: Denies heartburn, nausea/vomiting, diarrhea and constipation, black/bloody, and tarry stools. · Genitourinary: Denies dysuria and hematuria  · Hematologic: Denies excessive bruising or bleeding  · Lymphatic: Denies lumps and bumps to neck, axilla, breast, and groin  · Endocrine: Denies excessive thirst. Denies intolerance to hot and cold  · Psychiatric: Denies anxiety and depression. PHYSICAL EXAM:   BP (!) 207/76   Pulse 76   Temp 97.3 °F (36.3 °C) (Temporal)   Resp 19   Ht 6' 2\" (1.88 m)   Wt 275 lb (124.7 kg)   SpO2 97%   BMI 35.31 kg/m²   CONST:  Well developed, morbidly obese middle aged male who appears stated age. Awake, alert, cooperative, no apparent distress  HEENT:   Head- Normocephalic, atraumatic   Eyes- Conjunctivae pink, anicteric  Throat- Oral mucosa pink and moist  Neck-  No stridor, trachea midline, no jugular venous distention. No adenopathy   CHEST: Chest symmetrical and non-tender to palpation. No accessory muscle use or intercostal retractions  RESPIRATORY: Lung sounds - clear throughout fields   CARDIOVASCULAR:     No carotid bruit  Heart Inspection- shows no noted pulsations  Heart Palpation- no heaves or thrills; PMI is non-displaced   Heart Ausculation- Regular rate and rhythm, systolic murmur. No s3, s4 or rub   PV: No lower extremity edema. No varicosities. Pedal pulse palpable LLE, Right AKA noted  ABDOMEN: Soft, obese, non-tender to light palpation. Bowel sounds present. No palpable masses no organomegaly; no abdominal bruit  MS: Good muscle strength and tone. No atrophy or abnormal movements.    : Deferred  SKIN: Warm and dry no statis dermatitis or ulcers   NEURO / PSYCH: Oriented and peroneal distributions. 03/20/2020 CXR:   1. Normal chest with improved aeration of the lungs. 2. Some gas is identified in the ventral 5 soft tissues along the  proximal femur, suggesting gas in venous structures and along fascial  planes. 03/20/2020 Right Femur XRay: 1. Normal chest with improved aeration of the lungs. 2. Some gas is identified in the ventral 5 soft tissues along the proximal femur, suggesting gas in venous structures and along fascial planes. IMPRESSION and PLAN to follow as per Dr. Guillermo Wynne     Electronically signed by SHONDA Orr CNP on 3/23/2020 at 8:26 AM     I personally and independently saw and examined patient and reviewed all done pertinent laboratory data, imaging studies, ECGs and rhythm strips. I have reviewed and agree with the APN history and physical exam as documented in the above note. Electronically signed by Cris Uribe MD on 3/23/2020 at 5:18 PM    We were asked to see the patient for new onset Atrial Fibrillation.      IMPRESSION:  1. There is no documentation of A Fib. Some of the rhythm strips showed SR with artifact. Other rhythm strips do not belong to patient (and showed WCT/VT). 2. Right scrotal swelling/infection/s/p I&D   3. Acute right lower extremity limb ischemia s/p Right iliofemoral artery embolectomy with primary closure of arteriotomy. Right deep femoral artery embolectomy 03/21/2020  4. Anticoagulated on Eliquis   5. Echo 2018: Mild LVH, Normal EF, and no significant valvular abnormalities  6. Negative stress test in 05/2018  7. HTN  8. HLD  9. DM   10. CKD   11. Morbid obesity   12. Tobacco abuse   13. PAD s/p right AKA 11/2018  14. Hx of RLE DVT in 2018 treated at that time with Coumadin    15. Leukocytosis, trending down   16. Hyponatremia, resolved   17. Basically wheelchair bound      PLAN:   1. Continue current cardiac medications   2. Continue to monitor while in the hospital   3.  No advanced cardiac testing needed or planned   4. Cardiology will sign off. Please call if needed or if A Fib is actually documented. 5. Importance of risk factor modification emphasized      Electronically signed by Dariusz García MD on 3/23/2020 at 10:47 AM         Addendum:  I was informed by Dr. Lashae Garces that the patient was definitely in Afib when he saw him in the ED. Same recommendations for now. Patient is to be on oral anticoagulation anyway for 6 months. Consider a 1 month event monitor at the end of that period ( or a Kel[employer] loop recorder ) prior to discontinuing oral anticoagulation if no recurrent a fib is documented in the interim. If A fib is documented either way then he has to be on oral anticoagulation indefinitely.     Electronically signed by Dariusz García MD on 3/23/2020 at 12:33 PM

## 2020-03-23 NOTE — PROGRESS NOTES
Date: 3/23/2020    Time: 1:50 AM    Patient Placed On BIPAP/CPAP/ Non-Invasive Ventilation? No    If no must comment. Facial area red/color change? No           If YES are Blister/Lesion present? No   If yes must notify nursing staff  BIPAP/CPAP skin barrier?   Yes    Skin barrier type:duoderm       Comments:  Pt wearing bipap from previous shift      Viktor Hook

## 2020-03-23 NOTE — ANESTHESIA PRE PROCEDURE
Department of Anesthesiology  Preprocedure Note       Name:  Joe Torres   Age:  60 y.o.  :  1955                                          MRN:  [id number]         Date:  3/23/2020      Surgeon: Christa Boast):  Sam Odonnell MD    Procedure: JUSTO (BEDSIDE)    Medications prior to admission:   Prior to Admission medications    Medication Sig Start Date End Date Taking? Authorizing Provider   ONE TOUCH ULTRA TEST strip USE TO TEST BLOOD SUGARS DAILY AS NEEDED 3/9/20   Juanito Kline, DO   HYDROcodone-acetaminophen (NORCO) 5-325 MG per tablet Take 1 tablet by mouth every 6 hours as needed for Pain for up to 30 days. Intended supply: 30 days  For breakthrough 3/6/20 4/5/20  Juanito Kline, DO   oxyCODONE ER 13.5 MG C12A Take 13.5 mg by mouth 2 times daily for 30 days. 3/6/20 4/5/20  Juanito Kline, DO   DULoxetine (CYMBALTA) 60 MG extended release capsule TAKE 2 CAPSULES BY MOUTH EVERY MORNING 20   Juanito Kline, DO   cloNIDine (CATAPRES) 0.1 MG tablet TAKE 1 TABLET BY MOUTH 3 TIMES A DAY 20   Juanito Kline, DO   amLODIPine (NORVASC) 10 MG tablet TAKE ONE TABLET BY MOUTH EVERY MORNING - HOLD IF SBP<140 20   Juanito Kline, DO   LANTUS 100 UNIT/ML injection vial INJECT 15 UNITS INTO THE SKIN TWO TIMES A DAY 20   Juanito Kline, DO   lubiprostone (AMITIZA) 24 MCG capsule Take 1 capsule by mouth 2 times daily (with meals) 20   Juanito Kline, DO   metoprolol (LOPRESSOR) 100 MG tablet TAKE 1 TABLET BY MOUTH 2 TIMES A DAY 20   Juanito Kline, DO   HYDROcodone-acetaminophen (NORCO) 5-325 MG per tablet Take 1 tablet by mouth every 4 hours as needed for Pain for up to 30 days.  1/4/20 2/3/20  Juanito Kline, DO   insulin lispro (HUMALOG) 100 UNIT/ML injection vial Inject 5 Units into the skin 3 times daily (with meals) 19   Juanito Kline, DO   Handicap Placard MISC by Does not apply route Patient cannot walk 200 ft without stopping to rest.    Expiration 10/21/2024 10/21/19 Intravenous Q30 Min PRN Bhavani Lu MD   10 mg at 03/21/20 0857    hydrALAZINE (APRESOLINE) injection 10 mg  10 mg Intravenous Q30 Min PRN Bhavani Lu MD   10 mg at 03/21/20 0902    0.9 % sodium chloride infusion   Intravenous Lily Jc MD   Stopped at 03/23/20 1157    pantoprazole (PROTONIX) tablet 40 mg  40 mg Oral QAM AC Elisabeth Angelo MD   40 mg at 03/23/20 0622    glucose (GLUTOSE) 40 % oral gel 15 g  15 g Oral PRN Elisabeth Angelo MD        dextrose 50 % IV solution  12.5 g Intravenous PRN Elisabeth Angelo MD        glucagon (rDNA) injection 1 mg  1 mg Intramuscular PRN Elisabeth Angelo MD        dextrose 5 % solution  100 mL/hr Intravenous PRSANTANA Angelo MD        sodium chloride flush 0.9 % injection 10 mL  10 mL Intravenous PRN Bhavani Lu MD   10 mL at 03/20/20 1928       Allergies:  No Known Allergies    Problem List:    Patient Active Problem List   Diagnosis Code    DM II (diabetes mellitus, type II), controlled ([facility] E11.9    HTN (hypertension) I10    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 ([facility] [address] [facility] I70.65    Coagulopathy (HCC) D68.9    Moderate protein-calorie malnutrition (HCC) E44.0    PVD (peripheral vascular disease) ([facility] [facility] I73.9    Leukocytosis D72.829    Stage 3 chronic kidney disease ([facility] [facility] N18.3    Tobacco dependence F17.200    HLD (hyperlipidemia) E78.5    History of DVT (deep vein thrombosis) Z86.718    Osteomyelitis ([facility] [facility] M86.9    S/P AKA (above knee amputation), right ([address] Z89.611    History of vascular surgery Z98.890    Occlusion of common femoral artery ([facility] [address] I70.209    Cellulitis, scrotum N49.2    Hyperglycemia due to type 2 diabetes mellitus ([address] E11.65    Critical lower limb ischemia I99.8    Gas gangrene of thigh ([facility] [address] A48.0    Vascular occlusion I99.8       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 ([facility] [address] 5/22/2018    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 ([facility] [address] 5/21/2018    Critical lower limb ischemia 3/20/2020    Diabetes mellitus ([facility] [address] [facility]     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 2 diabetes mellitus, with fat layer exposed ([facility] [address] 5/22/2018    DVT, lower extremity (HCC)     right leg     Gas gangrene of thigh ([facility] [address] 3/20/2020    Hyperlipidemia     Hypertension     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[facility] [address]     PVD (peripheral vascular disease) ([facility] [address]        Past Surgical History:        Procedure Laterality Date    FEMORAL BYPASS      Right - [address], [address] Right 3/20/2020    RIGHT LOWER EXTREMITY THROMBECTOMY, POSSIBLE ANGIOGRAM, POSSIBLE INTERVENTION, POSSIBLE BYPASS. performed by Juanis Hobbs MD at [facility] Right 11/1/2018    AMPUTATION ABOVE KNEE RIGHT LEG performed by Juanis Hobbs MD at [facility] Right 5/24/2018    RIGHT FOOT INCISION AND DRAINAGE WITH PARTIAL BONE RESECTION performed by Floers Vilchis DPM at [facility] OFFICE/OUTPT VISIT,PROCEDURE ONLY Right 8/3/2018    INCISION AND DRAINAGE MULTIPLE AREAS RIGHT FOOT WITH DEBRIDEMENT SOFT TISSUE performed by Flores Vilchis DPM at [facility] [address] Right     leg        Social History:    Social History     Tobacco Use    Smoking status: Current Every Day Smoker     Packs/day: 0.50     Years: 7.00     Pack years: 3.50     Types: Cigarettes    Smokeless tobacco: Never Used    Tobacco comment: 5-7 a day    Substance Use Topics    Alcohol use:  No                                Ready to quit: Not Answered  Counseling given: Not Answered  Comment: 5-7 a day       Vital Signs (Current):   Vitals:    03/23/20 1000 03/23/20 1031 03/23/20 1100 03/23/20 1200   BP:  (!) 129/46     Pulse: 68 73 75 72   Resp: 16  20 13   Temp:    36.5 °C (97.7 °F)   TempSrc:    Temporal   SpO2: 96%  98% 96%   Weight: Height:                                                  BP Readings from Last 3 Encounters:   03/23/20 (!) 129/46   03/20/20 (!) 104/57   01/20/20 (!) 154/82       NPO Status:  >8 hrs                                                                               BMI:   Wt Readings from Last 3 Encounters:   07/11/19 275 lb (124.7 kg)   06/14/19 275 lb (124.7 kg)   12/17/18 299 lb (135.6 kg)     Body mass index is 35.31 kg/m². CBC:   Lab Results   Component Value Date    WBC 18.4 03/23/2020    RBC 4.16 03/23/2020    HGB 11.2 03/23/2020    HCT 34.2 03/23/2020    MCV 82.2 03/23/2020    RDW 14.3 03/23/2020     03/23/2020       CMP:   Lab Results   Component Value Date     03/23/2020    K 4.2 03/23/2020    K 4.9 03/20/2020     03/23/2020    CO2 24 03/23/2020    BUN 25 03/23/2020    CREATININE 1.9 03/23/2020    GFRAA 44 03/23/2020    LABGLOM 44 03/23/2020    GLUCOSE 197 03/23/2020    PROT 6.0 03/23/2020    CALCIUM 8.3 03/23/2020    BILITOT 0.4 03/23/2020    ALKPHOS 109 03/23/2020     03/23/2020    ALT 31 03/23/2020       POC Tests: No results for input(s): POCGLU, POCNA, POCK, POCCL, POCBUN, POCHEMO, POCHCT in the last 72 hours.     Coags:   Lab Results   Component Value Date    PROTIME 14.6 03/20/2020    INR 1.3 03/20/2020    APTT 68.6 03/22/2020       HCG (If Applicable): No results found for: PREGTESTUR, PREGSERUM, HCG, HCGQUANT     ABGs:   Lab Results   Component Value Date    PMQ4FHF 22.8 03/20/2020        Type & Screen (If Applicable):  No results found for: LABABO, [address]    Anesthesia Evaluation  Patient summary reviewed and Nursing notes reviewed no history of anesthetic complications:   Airway:         Dental:          Pulmonary:   (+) pneumonia:  sleep apnea: on CPAP,  current smoker                           Cardiovascular:  Exercise tolerance: poor (<4 METS),   (+) hypertension:,               Stress test reviewed  Cleared by cardiology           ROS comment: HLD     Neuro/Psych: venous structures and along fascial   planes. ALERT:  THIS IS AN ABNORMAL REPORT          Anesthesia Plan      MAC     ASA 3       Induction: intravenous. Anesthetic plan and risks discussed with patient. Use of blood products discussed with patient whom. Plan discussed with attending.                   SHONDA Dc - CRNA   3/23/2020

## 2020-03-23 NOTE — PROGRESS NOTES
Subjective:  Feeling better   No CP or SOB  No fever or chills   No uncontrolled pain  No vomiting or diarrhea     Objective:    BP (!) 124/49   Pulse 68   Temp 97.3 °F (36.3 °C) (Temporal)   Resp 16   Ht 6' 2\" (1.88 m)   Wt 275 lb (124.7 kg)   SpO2 96%   BMI 35.31 kg/m²     24HR INTAKE/OUTPUT:      Intake/Output Summary (Last 24 hours) at 3/23/2020 1023  Last data filed at 3/23/2020 0957  Gross per 24 hour   Intake 2960 ml   Output 975 ml   Net 1985 ml     nad  Heart:  RRR, no murmurs, gallops, or rubs. Lungs:  CTA bilaterally, no wheeze, rales or rhonchi  Abd: bowel sounds present, nontender, nondistended, no masses  Extrem:  No clubbing, cyanosis, or edema    Most Recent Labs  Lab Results   Component Value Date    WBC 18.4 (H) 03/23/2020    HGB 11.2 (L) 03/23/2020    HCT 34.2 (L) 03/23/2020     03/23/2020     03/23/2020    K 4.2 03/23/2020     03/23/2020    CREATININE 1.9 (H) 03/23/2020    BUN 25 (H) 03/23/2020    CO2 24 03/23/2020    GLUCOSE 197 (H) 03/23/2020    ALT 31 03/23/2020     (H) 03/23/2020    INR 1.3 03/20/2020    TSH 1.100 10/25/2018    LABA1C 11.3 (H) 03/21/2020    LABMICR 71.6 (H) 05/23/2018     Recent Labs     03/23/20  0415   MG 2.0     Lab Results   Component Value Date    CALCIUM 8.3 (L) 03/23/2020    PHOS 2.5 03/23/2020        CTA ABDOMINAL AORTA W BILAT RUNOFF W CONTRAST   Final Result   There is complete thrombotic occlusion of the origin of the right   common femoral artery with some gas in the proximal superficial   femoral artery in the upper thigh, with gas extending into the   quadriceps muscles of the upper thigh amputation stump. There is a fluid collection in the right hemiscrotal subcutaneous fat,   which is not associated with gangrenous gas or prominent scrotal wall   thickening.       Atherosclerotic runoff findings are noted in the left leg, which has   arterial patency to the ankle, primarily in the posterior tibial and   peroneal distributions. ALERT:  THIS IS AN ABNORMAL REPORT-thrombosis of the common femoral   artery with gangrene in the upper thigh musculature. Note: Emergency department physician was contacted telephonically at   the time of this dictation to communicate findings. XR FEMUR RIGHT (MIN 2 VIEWS)   Final Result   1. Normal chest with improved aeration of the lungs. 2. Some gas is identified in the ventral 5 soft tissues along the   proximal femur, suggesting gas in venous structures and along fascial   planes. ALERT:  THIS IS AN ABNORMAL REPORT      XR CHEST PORTABLE   Final Result   1. Normal chest with improved aeration of the lungs. 2. Some gas is identified in the ventral 5 soft tissues along the   proximal femur, suggesting gas in venous structures and along fascial   planes. ALERT:  THIS IS AN ABNORMAL REPORT      IR FLUORO GUIDED CVA DEVICE PLMT/REPLACE/REMOVAL    (Results Pending)   US DUP UPPER EXTREMITY LEFT VENOUS    (Results Pending)       Assessment    Principal Problem:    Critical lower limb ischemia  Active Problems:    DM II (diabetes mellitus, type II), controlled (HCC)    HTN (hypertension)    Leukocytosis    Stage 3 chronic kidney disease (HCC)    HLD (hyperlipidemia)    Occlusion of common femoral artery (HCC)    Cellulitis, scrotum    Hyperglycemia due to type 2 diabetes mellitus (HCC)    Gas gangrene of thigh ([address]    Vascular occlusion  Resolved Problems:    * No resolved hospital problems.  *      Plan:  72-year-old male history of diabetes, hypertension, PAD admitted to CVICU with right lower extremity critical ischemia and right scrotal abscess status post Right groin exploration, I&D of right scrotal abscess right iliofemoral artery embolectomy with primary closure of arteriotomy, Right deep femoral artery embolectomy, Right lower extremity diagnostic incision and drainage 3/20    Zosyn/ vancomycin  Surgical culture positive for mixed gram-positive

## 2020-03-23 NOTE — PROGRESS NOTES
OCCUPATIONAL THERAPY INITIAL EVALUATION      Date:3/23/2020  Patient Name: Audelia Bills  MRN: [id number]  : 1955  Room: 3820/[facility]0-A    Referring Provider: LORRAINE Ayon    Evaluating OT: James TilleyernestoCLARKR/L [id number]    AM-PAC Daily Activity Raw Score:     Recommended Adaptive Equipment: TBA: LB dressing AE; drop arm 3in1 commode as needed       Diagnosis:  Occlusion of common femoral artery    Reason for admission: Presented to ED with R LE hip, R testicular pain. Surgery: 3/21 Right groin exploration, Right iliofemoral artery embolectomy with primary closure of arteriotomy, Right deep femoral artery embolectomy, Right lower extremity diagnostic incision evaluation, Right scrotal incision and drainage     Pertinent Medical History: R AKA with prosthesis, DM, HLD, HTN, Legionnaire's disease, PVD    Precautions:  Falls, R AKA, O2, arterial line     Home Living: Pt lives alone  in a first floor apt with 1step(s) to enter and no rail(s); bed/bath on first floor  Bathroom setup: tub/shower with bench; standard height commode/no rails  Equipment owned: bed rails, tub transfer bench, [employer], crutches, WC    Prior Level of Function: Mod I with ADLs; Assist with IADLs. [facility] AT [facility] aide 2 hrs/day to assist with housekeeping and transportation. Pt reports using crutches for stair negotiation, WW for bathroom mobility and WC for mobility in apt. Driving: no  Occupation: disability     Pain Level: pt c/o 10/10 incisional pain; back pain due to \"pinched nerve\"  this session      Cognition: A&O: 4/4    Follows 1-2 step commands with increased time.    Memory: F   Comprehension F   Problem solving: F   Judgement/safety: Fair-               Communication skills: WFL           Vision: WFL               Glasses:yes                                                   Hearing: WFL     RASS: 0  CAM-ICU: (NT) Delirium    UE Assessment:  Hand Dominance: Right [x]  Left []     ROM Strength   RUE  WFL 4/5   LUE WFL 4/5 Sensation: No c/o numbness or tingling in extremities   Tone: WNL   Edema: min L UE edema- new since surgery      Functional Assessment   Initial Eval Status  Date: 3/23 Treatment Status  Date: STG=LTG  5-14  days    Feeding S; set up                        Mod I  while seated up in chair to increase activity tolerance        Grooming Min A                        S; set up   while seated/EOB/chair/WC sink level   UB dressing/bathing Max A  Limited by medical lines and activity tolerance                         S; set up       LB dressing/bathing Max A  Seated EOB; decreased dynamic balance; decreased functional tolerance. Pt introduced to possible use of LB dressing AE for energy conservation. Pt not interested at this time. SBA/S   using AE as needed for safe reach/ energy conservation       Toileting Dep                             Bed Mobility  Rolling: SBA using bed rail    Supine to sit: Min A with  HOB elevated with min cues for technique using bed rails    Sit to supine: Min A                        Mod I  in prep of ADL tasks & transfers   Functional Transfers Sit to stand: NT    Stand to sit: NT                        SBA  sit<>stand/functional bathroom transfers using AD/DME as needed for balance and safety   Functional Mobility NT                       SBA   functional/bathroom mobility using AD as needed & demonstrating G safety     Balance Sitting:     Static:  SBA    Dynamic:Min A  Standing: NT  S dynamic sitting balance; SBA dynamic standing balance  during ADL tasks & transfers   Endurance/Activity Tolerance   F tolerance with moderate activity. Pt sat EOB >8 min during trunk control and LB ADL activities.   G  tolerance with moderate activity/self care routine   Visual/  Perceptual   WFL                       Vitals:   Heart Rate at rest 70 bpm Heart Rate post session 67 bpm   SpO2 at rest 95% SpO2 post session 95%   Blood Pressure at rest 143/49 mmHg Blood Pressure post

## 2020-03-23 NOTE — PLAN OF CARE
symptoms of excessive bleeding  3/23/2020 1951 by Jim Sullivan RN  Outcome: Met This Shift  3/23/2020 0730 by Jesus Tavares RN  Outcome: Met This Shift  Goal: Ability to maintain a balanced intake and output will improve  Description: Ability to maintain a balanced intake and output will improve  3/23/2020 1951 by Jim Sullivan RN  Outcome: Met This Shift  3/23/2020 0730 by Jesus Tavares RN  Outcome: Met This Shift  Goal: Absence of angry outbursts  Description: Hemoglobin within specified parameters  3/23/2020 1951 by Jim Sullivan RN  Outcome: Met This Shift  3/23/2020 0730 by Jesus Tavares RN  Outcome: Met This Shift     Problem: Infection - Risk of, Surgical Site:  Goal: Demonstration of wound healing without infection will improve  Description: Demonstration of wound healing without infection will improve  3/23/2020 1951 by Jim Sullivan RN  Outcome: Met This Shift  3/23/2020 0730 by Jesus Tavares RN  Outcome: Met This Shift  Goal: Ability to maintain appropriate glucose levels will improve to within specified parameters  Description: Ability to maintain appropriate glucose levels will improve to within specified parameters  3/23/2020 1951 by Jim uSllivan RN  Outcome: Met This Shift  3/23/2020 0730 by Jesus Tavares RN  Outcome: Met This Shift     Problem: Tissue Perfusion - Cardiopulmonary, Altered:  Goal: Circulatory function within specified parameters  Description: Circulatory function within specified parameters  3/23/2020 1951 by Jim Sullivan RN  Outcome: Met This Shift  3/23/2020 0730 by Jesus Tavares RN  Outcome: Met This Shift  Goal: Will show no evidence of cardiac arrhythmias  Description: Will show no evidence of cardiac arrhythmias  3/23/2020 1951 by Jim Sullivan RN  Outcome: Met This Shift  3/23/2020 0730 by Jesus Tavares RN  Outcome: Met This Shift     Problem: Venous Thromboembolism - Risk of:  Goal: Will show no signs or symptoms of venous

## 2020-03-23 NOTE — PROGRESS NOTES
post session 95%   Blood Pressure at rest 143/49 mmHg Blood Pressure post session 128/44 mmHg     Functional Status Score-Intensive Care Unit (FSS-ICU)   Rolling 5/7   Supine to sit transfer 4/7   Unsupported sitting  4/7   Sit to stand transfers -/7   Ambulation -/7   Total  13/35       Therapeutic Exercises:  NA    Patient education  Pt educated on safety    Patient response to education:   Pt verbalized understanding Pt demonstrated skill Pt requires further education in this area   x partial x     ASSESSMENT:    Comments:  RN reported pt was stable for session. Pt was supine in bed upon arrival, agreeable to initial evaluation with encouragement. Pt required increased time and trunk assistance for bed mobility. Posterior lean noted with dynamic activities at EOB. Pt deferred further activity. Pt was returned to bed with all needs met and call light in reach. All lines remained intact. Treatment:  Patient practiced and was instructed in the following treatment:     Bed mobility training - pt given verbal and tactile cues to facilitate proper sequencing and safety during rolling and supine>sit as well as provided with physical assistance to complete task    Sitting EOB for >8 minutes for upright tolerance, postural awareness and BLE ROM    Pt's/ family goals   1. Return home    Patient and or family understand(s) diagnosis, prognosis, and plan of care. Yes    PLAN:    PT care will be provided in accordance with the objectives noted above. Exercises and functional mobility practice will be used as well as appropriate assistive devices or modalities to obtain goals. Patient and family education will also be administered as needed. Frequency of treatments: 2-5x/week x 1-2 weeks.     Time in  0910  Time out  0930    Total Treatment Time  15 minutes     Evaluation Time includes thorough review of current medical information, gathering information on past medical history/social history and prior level of function, completion of standardized testing/informal observation of tasks, assessment of data and education on plan of care and goals.     CPT codes:  [x] Low Complexity PT evaluation 04985  [] Moderate Complexity PT evaluation 30149  [] High Complexity PT evaluation 10466  [] PT Re-evaluation 40967  [] Gait training 63361 - minutes  [] Manual therapy 89684 - minutes  [x] Therapeutic activities 84593 15 minutes  [] Therapeutic exercises 18816 - minutes  [] Neuromuscular reeducation 40896 - minutes     Rocael Kebede, PT, DPT  [id number]

## 2020-03-23 NOTE — PLAN OF CARE
Problem: Pain:  Goal: Pain level will decrease  Description: Pain level will decrease  3/23/2020 0730 by Jimmie Clinton RN  Outcome: Met This Shift  3/22/2020 2044 by Dmitry Joya RN  Outcome: Met This Shift  Goal: Control of acute pain  Description: Control of acute pain  3/23/2020 0730 by Jimmie Clinton RN  Outcome: Met This Shift  3/22/2020 2044 by Dmitry Joya RN  Outcome: Met This Shift  Goal: Control of chronic pain  Description: Control of chronic pain  Outcome: Met This Shift     Problem: Falls - Risk of:  Goal: Will remain free from falls  Description: Will remain free from falls  3/23/2020 0730 by Jimmie Clinton RN  Outcome: Met This Shift  3/22/2020 2044 by Dmitry Joya RN  Outcome: Met This Shift  Goal: Absence of physical injury  Description: Absence of physical injury  3/23/2020 0730 by Jimmie Clinton RN  Outcome: Met This Shift  3/22/2020 2044 by Dmitry Joya RN  Outcome: Met This Shift     Problem: Discharge Planning:  Goal: Ability to perform activities of daily living will improve  Description: Ability to perform activities of daily living will improve  Outcome: Met This Shift  Goal: Absence of nausea/vomiting  Description: Absence of nausea/vomiting  Outcome: Met This Shift     Problem: Fluid Volume - Risk of, Imbalance:  Goal: Absence of imbalanced fluid volume signs and symptoms  Description: Absence of imbalanced fluid volume signs and symptoms  Outcome: Met This Shift  Goal: Will show no signs and symptoms of excessive bleeding  Description: Will show no signs and symptoms of excessive bleeding  3/23/2020 0730 by Jimmie Clinton RN  Outcome: Met This Shift  3/22/2020 2044 by Dmitry Joya RN  Outcome: Met This Shift  Goal: Ability to maintain a balanced intake and output will improve  Description: Ability to maintain a balanced intake and output will improve  Outcome: Met This Shift  Goal: Absence of angry outbursts  Description: Hemoglobin within specified parameters  Outcome:  Met This Shift     Problem: Infection - Risk of, Surgical Site:  Goal: Demonstration of wound healing without infection will improve  Description: Demonstration of wound healing without infection will improve  Outcome: Met This Shift  Goal: Ability to maintain appropriate glucose levels will improve to within specified parameters  Description: Ability to maintain appropriate glucose levels will improve to within specified parameters  Outcome: Met This Shift     Problem: Tissue Perfusion - Cardiopulmonary, Altered:  Goal: Circulatory function within specified parameters  Description: Circulatory function within specified parameters  3/23/2020 0730 by Maria Eugenia Rubalcava RN  Outcome: Met This Shift  3/22/2020 2044 by Erendira Navarrete RN  Outcome: Met This Shift  Goal: Will show no evidence of cardiac arrhythmias  Description: Will show no evidence of cardiac arrhythmias  Outcome: Met This Shift     Problem: Venous Thromboembolism - Risk of:  Goal: Will show no signs or symptoms of venous thromboembolism  Description: Will show no signs or symptoms of venous thromboembolism  Outcome: Met This Shift

## 2020-03-24 ENCOUNTER — ANESTHESIA (OUTPATIENT)
Dept: NON INVASIVE DIAGNOSTICS | Age: 65
DRG: 270 | End: 2020-03-24
Payer: COMMERCIAL

## 2020-03-24 ENCOUNTER — ANESTHESIA EVENT (OUTPATIENT)
Dept: NON INVASIVE DIAGNOSTICS | Age: 65
DRG: 270 | End: 2020-03-24
Payer: COMMERCIAL

## 2020-03-24 ENCOUNTER — APPOINTMENT (OUTPATIENT)
Dept: NON INVASIVE DIAGNOSTICS | Age: 65
DRG: 270 | End: 2020-03-24
Payer: COMMERCIAL

## 2020-03-24 VITALS — OXYGEN SATURATION: 95 % | DIASTOLIC BLOOD PRESSURE: 65 MMHG | SYSTOLIC BLOOD PRESSURE: 111 MMHG

## 2020-03-24 LAB
ALBUMIN SERPL-MCNC: 1.9 G/DL (ref 3.5–5.2)
ALP BLD-CCNC: 119 U/L (ref 40–129)
ALT SERPL-CCNC: 32 U/L (ref 0–40)
ANION GAP SERPL CALCULATED.3IONS-SCNC: 10 MMOL/L (ref 7–16)
AST SERPL-CCNC: 84 U/L (ref 0–39)
BILIRUB SERPL-MCNC: 0.4 MG/DL (ref 0–1.2)
BUN BLDV-MCNC: 26 MG/DL (ref 8–23)
CALCIUM IONIZED: 1.26 MMOL/L (ref 1.15–1.33)
CALCIUM SERPL-MCNC: 8.1 MG/DL (ref 8.6–10.2)
CHLORIDE BLD-SCNC: 102 MMOL/L (ref 98–107)
CO2: 22 MMOL/L (ref 22–29)
CREAT SERPL-MCNC: 1.7 MG/DL (ref 0.7–1.2)
GFR AFRICAN AMERICAN: 50
GFR NON-AFRICAN AMERICAN: 50 ML/MIN/1.73
GLUCOSE BLD-MCNC: 264 MG/DL (ref 74–99)
HCT VFR BLD CALC: 31.8 % (ref 37–54)
HEMOGLOBIN: 10.2 G/DL (ref 12.5–16.5)
LV EF: 60 %
MAGNESIUM: 1.9 MG/DL (ref 1.6–2.6)
MCH RBC QN AUTO: 26.2 PG (ref 26–35)
MCHC RBC AUTO-ENTMCNC: 32.1 % (ref 32–34.5)
MCV RBC AUTO: 81.7 FL (ref 80–99.9)
METER GLUCOSE: 177 MG/DL (ref 74–99)
METER GLUCOSE: 196 MG/DL (ref 74–99)
METER GLUCOSE: 237 MG/DL (ref 74–99)
METER GLUCOSE: 245 MG/DL (ref 74–99)
PDW BLD-RTO: 14.2 FL (ref 11.5–15)
PHOSPHORUS: 2.2 MG/DL (ref 2.5–4.5)
PLATELET # BLD: 165 E9/L (ref 130–450)
PMV BLD AUTO: 13.4 FL (ref 7–12)
POTASSIUM SERPL-SCNC: 4.1 MMOL/L (ref 3.5–5)
RBC # BLD: 3.89 E12/L (ref 3.8–5.8)
SODIUM BLD-SCNC: 134 MMOL/L (ref 132–146)
TOTAL PROTEIN: 5.6 G/DL (ref 6.4–8.3)
WBC # BLD: 14.7 E9/L (ref 4.5–11.5)

## 2020-03-24 PROCEDURE — 2140000000 HC CCU INTERMEDIATE R&B

## 2020-03-24 PROCEDURE — 6370000000 HC RX 637 (ALT 250 FOR IP): Performed by: STUDENT IN AN ORGANIZED HEALTH CARE EDUCATION/TRAINING PROGRAM

## 2020-03-24 PROCEDURE — 6370000000 HC RX 637 (ALT 250 FOR IP): Performed by: PHYSICIAN ASSISTANT

## 2020-03-24 PROCEDURE — 6370000000 HC RX 637 (ALT 250 FOR IP): Performed by: NURSE PRACTITIONER

## 2020-03-24 PROCEDURE — 82962 GLUCOSE BLOOD TEST: CPT

## 2020-03-24 PROCEDURE — 2700000000 HC OXYGEN THERAPY PER DAY

## 2020-03-24 PROCEDURE — 2580000003 HC RX 258: Performed by: SURGERY

## 2020-03-24 PROCEDURE — 2580000003 HC RX 258: Performed by: ANESTHESIOLOGIST ASSISTANT

## 2020-03-24 PROCEDURE — 93325 DOPPLER ECHO COLOR FLOW MAPG: CPT

## 2020-03-24 PROCEDURE — 2580000003 HC RX 258: Performed by: PHYSICIAN ASSISTANT

## 2020-03-24 PROCEDURE — 3700000001 HC ADD 15 MINUTES (ANESTHESIA)

## 2020-03-24 PROCEDURE — 6360000002 HC RX W HCPCS

## 2020-03-24 PROCEDURE — 94660 CPAP INITIATION&MGMT: CPT

## 2020-03-24 PROCEDURE — 6360000002 HC RX W HCPCS: Performed by: ANESTHESIOLOGIST ASSISTANT

## 2020-03-24 PROCEDURE — 93312 ECHO TRANSESOPHAGEAL: CPT

## 2020-03-24 PROCEDURE — B246ZZ4 ULTRASONOGRAPHY OF RIGHT AND LEFT HEART, TRANSESOPHAGEAL: ICD-10-PCS | Performed by: INTERNAL MEDICINE

## 2020-03-24 PROCEDURE — 99233 SBSQ HOSP IP/OBS HIGH 50: CPT | Performed by: INTERNAL MEDICINE

## 2020-03-24 PROCEDURE — 85027 COMPLETE CBC AUTOMATED: CPT

## 2020-03-24 PROCEDURE — 83735 ASSAY OF MAGNESIUM: CPT

## 2020-03-24 PROCEDURE — 80053 COMPREHEN METABOLIC PANEL: CPT

## 2020-03-24 PROCEDURE — 6370000000 HC RX 637 (ALT 250 FOR IP): Performed by: SURGERY

## 2020-03-24 PROCEDURE — 93321 DOPPLER ECHO F-UP/LMTD STD: CPT

## 2020-03-24 PROCEDURE — 84100 ASSAY OF PHOSPHORUS: CPT

## 2020-03-24 PROCEDURE — 3700000000 HC ANESTHESIA ATTENDED CARE

## 2020-03-24 PROCEDURE — 36415 COLL VENOUS BLD VENIPUNCTURE: CPT

## 2020-03-24 PROCEDURE — 2500000003 HC RX 250 WO HCPCS: Performed by: SPECIALIST

## 2020-03-24 PROCEDURE — 2580000003 HC RX 258

## 2020-03-24 PROCEDURE — 82330 ASSAY OF CALCIUM: CPT

## 2020-03-24 RX ORDER — PROPOFOL 10 MG/ML
INJECTION, EMULSION INTRAVENOUS PRN
Status: DISCONTINUED | OUTPATIENT
Start: 2020-03-24 | End: 2020-03-24 | Stop reason: SDUPTHER

## 2020-03-24 RX ORDER — SODIUM CHLORIDE 9 MG/ML
INJECTION, SOLUTION INTRAVENOUS CONTINUOUS PRN
Status: DISCONTINUED | OUTPATIENT
Start: 2020-03-24 | End: 2020-03-24 | Stop reason: SDUPTHER

## 2020-03-24 RX ADMIN — OXYCODONE AND ACETAMINOPHEN 2 TABLET: 5; 325 TABLET ORAL at 21:08

## 2020-03-24 RX ADMIN — PREGABALIN 200 MG: 100 CAPSULE ORAL at 08:01

## 2020-03-24 RX ADMIN — APIXABAN 5 MG: 5 TABLET, FILM COATED ORAL at 21:09

## 2020-03-24 RX ADMIN — DEXTROSE MONOHYDRATE 1750 MG: 50 INJECTION, SOLUTION INTRAVENOUS at 21:09

## 2020-03-24 RX ADMIN — INSULIN LISPRO 3 UNITS: 100 INJECTION, SOLUTION INTRAVENOUS; SUBCUTANEOUS at 17:48

## 2020-03-24 RX ADMIN — INSULIN GLARGINE 25 UNITS: 100 INJECTION, SOLUTION SUBCUTANEOUS at 21:10

## 2020-03-24 RX ADMIN — METOPROLOL TARTRATE 100 MG: 50 TABLET, FILM COATED ORAL at 08:01

## 2020-03-24 RX ADMIN — INSULIN GLARGINE 25 UNITS: 100 INJECTION, SOLUTION SUBCUTANEOUS at 09:39

## 2020-03-24 RX ADMIN — TAMSULOSIN HYDROCHLORIDE 0.4 MG: 0.4 CAPSULE ORAL at 07:59

## 2020-03-24 RX ADMIN — SODIUM CHLORIDE: 9 INJECTION, SOLUTION INTRAVENOUS at 06:31

## 2020-03-24 RX ADMIN — MICONAZOLE NITRATE 13 G: 20.6 POWDER TOPICAL at 14:04

## 2020-03-24 RX ADMIN — Medication 10 ML: at 08:09

## 2020-03-24 RX ADMIN — INSULIN LISPRO 3 UNITS: 100 INJECTION, SOLUTION INTRAVENOUS; SUBCUTANEOUS at 13:18

## 2020-03-24 RX ADMIN — CILOSTAZOL 100 MG: 100 TABLET ORAL at 08:00

## 2020-03-24 RX ADMIN — LUBIPROSTONE 24 MCG: 24 CAPSULE, GELATIN COATED ORAL at 08:00

## 2020-03-24 RX ADMIN — PREGABALIN 200 MG: 100 CAPSULE ORAL at 14:04

## 2020-03-24 RX ADMIN — INSULIN LISPRO 3 UNITS: 100 INJECTION, SOLUTION INTRAVENOUS; SUBCUTANEOUS at 21:10

## 2020-03-24 RX ADMIN — APIXABAN 5 MG: 5 TABLET, FILM COATED ORAL at 07:58

## 2020-03-24 RX ADMIN — Medication 10 ML: at 21:10

## 2020-03-24 RX ADMIN — LUBIPROSTONE 24 MCG: 24 CAPSULE, GELATIN COATED ORAL at 17:48

## 2020-03-24 RX ADMIN — CLONIDINE HYDROCHLORIDE 0.1 MG: 0.1 TABLET ORAL at 07:59

## 2020-03-24 RX ADMIN — METOPROLOL TARTRATE 100 MG: 50 TABLET, FILM COATED ORAL at 21:09

## 2020-03-24 RX ADMIN — PANTOPRAZOLE SODIUM 40 MG: 40 TABLET, DELAYED RELEASE ORAL at 07:59

## 2020-03-24 RX ADMIN — ASPIRIN 81 MG CHEWABLE TABLET 81 MG: 81 TABLET CHEWABLE at 07:59

## 2020-03-24 RX ADMIN — DULOXETINE HYDROCHLORIDE 120 MG: 60 CAPSULE, DELAYED RELEASE ORAL at 07:59

## 2020-03-24 RX ADMIN — INSULIN LISPRO 6 UNITS: 100 INJECTION, SOLUTION INTRAVENOUS; SUBCUTANEOUS at 08:12

## 2020-03-24 RX ADMIN — CLONIDINE HYDROCHLORIDE 0.1 MG: 0.1 TABLET ORAL at 21:09

## 2020-03-24 RX ADMIN — SPIRONOLACTONE 50 MG: 25 TABLET ORAL at 07:59

## 2020-03-24 RX ADMIN — ATORVASTATIN CALCIUM 10 MG: 10 TABLET, FILM COATED ORAL at 08:00

## 2020-03-24 RX ADMIN — SODIUM CHLORIDE: 9 INJECTION, SOLUTION INTRAVENOUS at 09:10

## 2020-03-24 RX ADMIN — CILOSTAZOL 100 MG: 100 TABLET ORAL at 21:09

## 2020-03-24 RX ADMIN — MICONAZOLE NITRATE 13 G: 20.6 POWDER TOPICAL at 21:11

## 2020-03-24 RX ADMIN — LISINOPRIL 10 MG: 10 TABLET ORAL at 07:59

## 2020-03-24 RX ADMIN — AMLODIPINE BESYLATE 10 MG: 10 TABLET ORAL at 07:57

## 2020-03-24 RX ADMIN — PREGABALIN 200 MG: 100 CAPSULE ORAL at 21:09

## 2020-03-24 RX ADMIN — CLONIDINE HYDROCHLORIDE 0.1 MG: 0.1 TABLET ORAL at 14:04

## 2020-03-24 RX ADMIN — PROPOFOL 100 MG: 10 INJECTION, EMULSION INTRAVENOUS at 09:12

## 2020-03-24 ASSESSMENT — PAIN DESCRIPTION - LOCATION: LOCATION: SCROTUM

## 2020-03-24 ASSESSMENT — PAIN DESCRIPTION - DESCRIPTORS: DESCRIPTORS: ACHING;DISCOMFORT

## 2020-03-24 ASSESSMENT — PAIN - FUNCTIONAL ASSESSMENT: PAIN_FUNCTIONAL_ASSESSMENT: PREVENTS OR INTERFERES SOME ACTIVE ACTIVITIES AND ADLS

## 2020-03-24 ASSESSMENT — PAIN SCALES - GENERAL
PAINLEVEL_OUTOF10: 9
PAINLEVEL_OUTOF10: 0
PAINLEVEL_OUTOF10: 0
PAINLEVEL_OUTOF10: 4
PAINLEVEL_OUTOF10: 0

## 2020-03-24 ASSESSMENT — PAIN DESCRIPTION - ONSET: ONSET: ON-GOING

## 2020-03-24 ASSESSMENT — LIFESTYLE VARIABLES: SMOKING_STATUS: 1

## 2020-03-24 ASSESSMENT — PAIN DESCRIPTION - PAIN TYPE: TYPE: SURGICAL PAIN

## 2020-03-24 ASSESSMENT — PAIN DESCRIPTION - FREQUENCY: FREQUENCY: CONTINUOUS

## 2020-03-24 NOTE — PLAN OF CARE
Problem: Pain:  Goal: Pain level will decrease  Description: Pain level will decrease  3/24/2020 0025 by Jennifer Martell RN  Outcome: Met This Shift  3/23/2020 1951 by Jennifer Martell RN  Outcome: Met This Shift  Goal: Control of acute pain  Description: Control of acute pain  3/24/2020 0025 by Jennifer Martell RN  Outcome: Met This Shift  3/23/2020 1951 by Jennifer Martell RN  Outcome: Met This Shift  Goal: Control of chronic pain  Description: Control of chronic pain  3/24/2020 0025 by Jennifer Martell RN  Outcome: Met This Shift  3/23/2020 1951 by Jennifer Martell RN  Outcome: Met This Shift     Problem: Falls - Risk of:  Goal: Will remain free from falls  Description: Will remain free from falls  3/24/2020 0025 by Jennifer Martell RN  Outcome: Met This Shift  3/23/2020 1951 by Jennifer Martell RN  Outcome: Met This Shift  Goal: Absence of physical injury  Description: Absence of physical injury  3/24/2020 0025 by Jennifer Martell RN  Outcome: Met This Shift  3/23/2020 1951 by Jennifer Martell RN  Outcome: Met This Shift     Problem: Discharge Planning:  Goal: Ability to perform activities of daily living will improve  Description: Ability to perform activities of daily living will improve  3/24/2020 0025 by Jennifer Martell RN  Outcome: Met This Shift  3/23/2020 1951 by Jennifer Martell RN  Outcome: Met This Shift  Goal: Absence of nausea/vomiting  Description: Absence of nausea/vomiting  3/24/2020 0025 by Jennifer Martell RN  Outcome: Met This Shift  3/23/2020 1951 by Jennifer Martell RN  Outcome: Met This Shift     Problem: Fluid Volume - Risk of, Imbalance:  Goal: Absence of imbalanced fluid volume signs and symptoms  Description: Absence of imbalanced fluid volume signs and symptoms  3/24/2020 0025 by Jennifer Martell RN  Outcome: Met This Shift  3/23/2020 1951 by Jennifer Martell RN  Outcome: Met This Shift  Goal: Will show no signs and symptoms of excessive bleeding  Description: Will show no signs and symptoms of excessive bleeding  3/24/2020 0025 by Sai Giles RN  Outcome: Met This Shift  3/23/2020 1951 by Sai Giles RN  Outcome: Met This Shift  Goal: Ability to maintain a balanced intake and output will improve  Description: Ability to maintain a balanced intake and output will improve  3/24/2020 0025 by Sai Giles RN  Outcome: Met This Shift  3/23/2020 1951 by Sai Giles RN  Outcome: Met This Shift  Goal: Absence of angry outbursts  Description: Hemoglobin within specified parameters  3/24/2020 0025 by Sai Giles RN  Outcome: Met This Shift  3/23/2020 1951 by Sai Giles RN  Outcome: Met This Shift     Problem: Infection - Risk of, Surgical Site:  Goal: Demonstration of wound healing without infection will improve  Description: Demonstration of wound healing without infection will improve  3/24/2020 0025 by Sai Giles RN  Outcome: Met This Shift  3/23/2020 1951 by Sai Giles RN  Outcome: Met This Shift  Goal: Ability to maintain appropriate glucose levels will improve to within specified parameters  Description: Ability to maintain appropriate glucose levels will improve to within specified parameters  3/24/2020 0025 by Sai Giles RN  Outcome: Met This Shift  3/23/2020 1951 by Sai Giles RN  Outcome: Met This Shift     Problem: Tissue Perfusion - Cardiopulmonary, Altered:  Goal: Circulatory function within specified parameters  Description: Circulatory function within specified parameters  3/24/2020 0025 by Sai Giles RN  Outcome: Met This Shift  3/23/2020 1951 by Sai Giles RN  Outcome: Met This Shift  Goal: Will show no evidence of cardiac arrhythmias  Description: Will show no evidence of cardiac arrhythmias  3/24/2020 0025 by Sai Giles RN  Outcome: Met This Shift  3/23/2020 1951 by Sai Giles RN  Outcome: Met This Shift     Problem: Venous

## 2020-03-24 NOTE — ANESTHESIA PRE PROCEDURE
pregabalin (LYRICA) 300 MG capsule Take 1 capsule by mouth 2 times daily for 30 days. 10/11/19 11/10/19  Juanito Kline,    naloxone 4 MG/0.1ML LIQD nasal spray 1 spray by Nasal route as needed for Opioid Reversal 9/10/19   Juanito Kline, DO   naloxone 4 MG/0.1ML LIQD nasal spray 1 spray by Nasal route as needed for Opioid Reversal  Patient not taking: Reported on 10/21/2019 7/12/19   Juanito Kline, DO   LANCETS MICRO THIN 47Q MISC 1 applicator by Does not apply route daily 6/14/19   Juanito Kline, DO   Insulin Syringe-Needle U-100 30G X 5/16\" 0.3 ML MISC 1 box by Does not apply route 5 times daily 5/30/19   Juanito Kline, DO   ferrous sulfate 325 (65 Fe) MG tablet Take 1 tablet by mouth 2 times daily (with meals)  Patient not taking: Reported on 10/21/2019 11/14/18   Mark Chaidez MD   lidocaine (LIDODERM) 5 % Place 1 patch onto the skin daily 12 hours on, 12 hours off. 11/14/18   Mark Chaidez MD   aspirin 81 MG chewable tablet Take 1 tablet by mouth daily 8/8/18   Devang Green MD   lisinopril (PRINIVIL;ZESTRIL) 10 MG tablet Take 1 tablet by mouth daily Hold if sbp<140 8/7/18   Devang Green MD   cilostazol (PLETAL) 100 MG tablet Take 100 mg by mouth 2 times daily    Historical Provider, MD   tiZANidine (ZANAFLEX) 4 MG tablet Take 4 mg by mouth every 8 hours as needed     Historical Provider, MD   pregabalin (LYRICA) 100 MG capsule Take 200 mg by mouth 3 times daily. Josephine Ko Historical Provider, MD   spironolactone (ALDACTONE) 50 MG tablet Take 50 mg by mouth every morning     Historical Provider, MD   atorvastatin (LIPITOR) 10 MG tablet Take 10 mg by mouth daily     Historical Provider, MD   esomeprazole (NEXIUM) 40 MG delayed release capsule Take 40 mg by mouth every morning (before breakfast)    Historical Provider, MD       Current medications:    No current facility-administered medications for this visit. No current outpatient medications on file.      Facility-Administered Medications Ordered in Other Visits   Medication Dose Route Frequency Provider Last Rate Last Dose    tamsulosin (FLOMAX) capsule 0.4 mg  0.4 mg Oral Daily Lupe Ambrose MD   0.4 mg at 03/24/20 0759    vancomycin (VANCOCIN) 1,750 mg in dextrose 5 % 500 mL IVPB  1,750 mg Intravenous Q24H [facility]   Stopped at 03/23/20 2230    miconazole (MICOTIN) 2 % powder 13 g  5 g/m2 Topical BID Clinton Grace MD   13 g at 03/23/20 2103    cefepime (MAXIPIME) 2 g IVPB extended (mini-bag)  2 g Intravenous Q24H Clinton Grace MD   Stopped at 03/23/20 1913    0.9 % sodium chloride infusion admixture  25 mL Intravenous Q24H Clinton Grace MD   Stopped at 03/23/20 2116    insulin lispro (HUMALOG) injection vial 0-18 Units  0-18 Units Subcutaneous TID WC Zaki Dunbar MD   6 Units at 03/24/20 2086    insulin lispro (HUMALOG) injection vial 0-9 Units  0-9 Units Subcutaneous Nightly Zaki Dunbar MD   3 Units at 03/23/20 2031    lidocaine PF 1 % injection 5 mL  5 mL Intradermal Once SHONDA Fernandez CNP        sodium chloride flush 0.9 % injection 10 mL  10 mL Intravenous PRN SHONDA Fernandez CNP        heparin flush 100 UNIT/ML injection 300 Units  3 mL Intravenous 2 times per day SHONDA Fernandez CNP        heparin flush 100 UNIT/ML injection 300 Units  3 mL Intracatheter PRN SHONDA Fernandez CNP        apixaban [facility]) tablet 5 mg  5 mg Oral BID Alexander Feliz MD   5 mg at 03/24/20 0758    insulin glargine (LANTUS) injection vial 25 Units  25 Units Subcutaneous BID SHONDA Fernandez CNP   25 Units at 03/23/20 0856    0.9 % sodium chloride infusion   Intravenous Continuous Lesa Shea  mL/hr at 03/24/20 0631      amLODIPine (NORVASC) tablet 10 mg  10 mg Oral Daily LORRAINE Benavides   10 mg at 03/24/20 0757    aspirin chewable tablet 81 mg  81 mg Oral Daily LORRAINE Benavides   81 mg at 03/24/20 0759    atorvastatin (LIPITOR) tablet 10 mg  10 mg Oral Daily Maria Bautista, PA   10 mg at 03/24/20 0800    cilostazol (PLETAL) tablet 100 mg  100 mg Oral BID Prudy Shanthitron, PA   100 mg at 03/24/20 0800    cloNIDine (CATAPRES) tablet 0.1 mg  0.1 mg Oral TID Prudy Gottron, PA   0.1 mg at 03/24/20 0759    DULoxetine (CYMBALTA) extended release capsule 120 mg  120 mg Oral QAM Prudy Gottron, PA   120 mg at 03/24/20 0759    lisinopril (PRINIVIL;ZESTRIL) tablet 10 mg  10 mg Oral Daily Prudy Gottron, PA   10 mg at 03/24/20 0759    lubiprostone (AMITIZA) capsule 24 mcg  24 mcg Oral BID WC Prudy Shanthitron, PA   24 mcg at 03/24/20 0800    metoprolol tartrate (LOPRESSOR) tablet 100 mg  100 mg Oral BID Prudy Shanthitron, PA   100 mg at 03/24/20 0801    pregabalin (LYRICA) capsule 200 mg  200 mg Oral TID Prudy Gottron, PA   200 mg at 03/24/20 0801    spironolactone (ALDACTONE) tablet 50 mg  50 mg Oral QAM Prudy Shanthitron, PA   50 mg at 03/24/20 0759    sodium chloride flush 0.9 % injection 10 mL  10 mL Intravenous 2 times per day Prudy Gottron, PA   10 mL at 03/24/20 0809    sodium chloride flush 0.9 % injection 10 mL  10 mL Intravenous PRN Prudy Gottron, PA        potassium chloride (KLOR-CON M) extended release tablet 40 mEq  40 mEq Oral PRN Prudy Gottron, PA        Or    potassium bicarb-citric acid (EFFER-K) effervescent tablet 40 mEq  40 mEq Oral PRN Prudy Gottron, PA        Or    potassium chloride 10 mEq/100 mL IVPB (Peripheral Line)  10 mEq Intravenous PRN Prudy Gottron, PA        acetaminophen (TYLENOL) tablet 650 mg  650 mg Oral Q6H PRN Prudy Gottron, PA        Or    acetaminophen (TYLENOL) suppository 650 mg  650 mg Rectal Q6H PRN Prudy Gottron, PA        magnesium hydroxide (MILK OF MAGNESIA) 400 MG/5ML suspension 30 mL  30 mL Oral Daily PRN Prudy Gottron, PA        promethazine (PHENERGAN) tablet 12.5 mg  12.5 mg Oral Q6H PRN Prudy Gottron, PA        Or    ondansetron (ZOFRAN) injection 4 mg  4 mg Intravenous Q6H PRN Prudy Gottron, PA        of thigh ([facility] A48.0    Vascular occlusion I99.8       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 ([facility] 5/22/2018    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 ([address] 5/21/2018    Critical lower limb ischemia 3/20/2020    Diabetes mellitus ([address]     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 performed by Yeison Cohen MD at [facility] Right 11/1/2018    AMPUTATION ABOVE KNEE RIGHT LEG performed by Yeison Cohen MD at [facility] Right 5/24/2018    RIGHT FOOT INCISION AND DRAINAGE WITH PARTIAL BONE RESECTION performed by Daisy Ariza DPM at [address] [place] [address] OFFICE/OUTPT VISIT,PROCEDURE ONLY Right 8/3/2018    INCISION AND DRAINAGE MULTIPLE AREAS RIGHT FOOT WITH DEBRIDEMENT SOFT TISSUE performed by Daisy Ariza DPM at [place] [address] Right     leg        Social History:    Social History     Tobacco Use    Smoking status: Current Every Day Smoker     Packs/day: 0.50     Years: 7.00     Pack years: 3.50     Types: Cigarettes    Smokeless tobacco: Never Used    Tobacco comment: 5-7 a day    Substance Use Topics    Alcohol use: No                                Ready to quit: Not Answered  Counseling given: Not Answered  Comment: 5-7 a day       Vital Signs (Current):    There were no vitals filed for this visit. BP Readings from Last 3 Encounters:   03/24/20 100/62   03/24/20 111/65   03/20/20 (!) 104/57       NPO Status:  >8 hrs                                                                               BMI:   Wt Readings from Last 3 Encounters:   07/11/19 275 lb (124.7 kg)   06/14/19 275 lb (124.7 kg)   12/17/18 299 lb (135.6 kg)     There is no height or weight on file to calculate BMI.    CBC:   Lab Results   Component Value Date    WBC 14.7 03/24/2020    RBC 3.89 03/24/2020    HGB 10.2 03/24/2020    HCT 31.8 03/24/2020    MCV 81.7 03/24/2020    RDW 14.2 03/24/2020     03/24/2020       CMP:   Lab Results   Component Value Date     03/24/2020    K 4.1 03/24/2020    K 4.9 03/20/2020     03/24/2020    CO2 22 03/24/2020    BUN 26 03/24/2020    CREATININE 1.7 03/24/2020    GFRAA 50 03/24/2020    LABGLOM 50 03/24/2020    GLUCOSE 264 03/24/2020    PROT 5.6 03/24/2020    CALCIUM 8.1 03/24/2020    BILITOT 0.4 03/24/2020    ALKPHOS 119 03/24/2020    AST 84 03/24/2020    ALT 32 03/24/2020       POC Tests: No results for input(s): POCGLU, POCNA, POCK, POCCL, POCBUN, POCHEMO, POCHCT in the last 72 hours.     Coags:   Lab Results   Component Value Date    PROTIME 14.6 03/20/2020    INR 1.3 03/20/2020    APTT 68.6 03/22/2020       HCG (If Applicable): No results found for: PREGTESTUR, PREGSERUM, HCG, HCGQUANT     ABGs:   Lab Results   Component Value Date    TLC0XYG 22.8 03/20/2020        Type & Screen (If Applicable):  No results found for: LABABO, [address]    Anesthesia Evaluation  Patient summary reviewed and Nursing notes reviewed no history of anesthetic complications:   Airway: Mallampati: III  TM distance: >3 FB     Mouth opening: > = 3 FB Dental:    (+) upper dentures and lower dentures      Pulmonary: breath sounds clear to auscultation  (+) pneumonia (Legionnaire Pneumonia):  sleep apnea: on CPAP,  current smoker distribution throughout the tissues. Infarction is a differential   diagnostic consideration. The proximal femur and amputation stump margin appear normal.               Impression   1. Normal chest with improved aeration of the lungs. 2. Some gas is identified in the ventral 5 soft tissues along the   proximal femur, suggesting gas in venous structures and along fascial   planes. ALERT:  THIS IS AN ABNORMAL REPORT            Anesthesia Plan      MAC     ASA 3       Induction: intravenous. Anesthetic plan and risks discussed with patient. Plan discussed with CRNA.     Attending anesthesiologist reviewed and agrees with Pre Eval content              SHONDA Zhou - CRNA   3/24/2020

## 2020-03-24 NOTE — PROGRESS NOTES
3/24/2020 0500  Gross per 24 hour   Intake 3100 ml   Output 1775 ml   Net 1325 ml          Gen: awake, alert and oriented x3, no apparent distress  CVS: RR  Resp: On BiPAP overnight  Abd: Soft, non-tender, non-distended  R LE: Palpable femoral pulse, incision c/d/i, open to air, groin incision c/d/i, stump with bruising distally, no signs of infection, scrotum with penrose drain, surrounding tissue soft  L LE: palpable femoral. 5/5 strength, normal capillary refill    LABS:    Lab Results   Component Value Date    WBC 14.7 (H) 03/24/2020    HGB 10.2 (L) 03/24/2020    HCT 31.8 (L) 03/24/2020     03/24/2020    PROTIME 14.6 (H) 03/20/2020    INR 1.3 03/20/2020    APTT 68.6 (H) 03/22/2020    K 4.1 03/24/2020    BUN 26 (H) 03/24/2020    CREATININE 1.7 (H) 03/24/2020       A/P  60 y.o. male S/P 1. Right groin exploration  2. Right iliofemoral artery embolectomy with primary closure of arteriotomy  3. Right deep femoral artery embolectomy   4.  Right lower extremity diagnostic incision and drainage    Continue to monitor post exam  Continue eliquis-at least 6 months, remainder of time pending event monitor per cardiology to evaluate for recurrent A fib  Okay to transfer to monitored floor, awaiting bed  UOP improved, Cr improved to 1.7 continue IVF given ongoing vanc, Medical management per primary team  Antibiotics per ID-continued vanc/cefepime, recultured  Hgb stable  Leukocytosis improving to 14 from 18, afebrile overnight  Will continue to follow  Will need central line-will discuss IR PICC vs Esposito    Discussed with Dr. Fritz Camarena seen and examined    No plan for Picc or esposito - disc with Dr. Carlos Sanchez, will not need iv abx for longer term    Cont eliquis  Cr stable  leukocytois improved    Sydnee Malik

## 2020-03-24 NOTE — FLOWSHEET NOTE
inpatient Wound Care(initial evaluation) 3820    Admit Date: 3/20/2020  4:49 PM    Reason for consult:  Right groin, stump, scrotum    Significant history: Reason for Admission: Right leg ischemia, scrotal abscess, hyperglycemia     Presented to the emergency room with right leg pain, right testicular pain. Noted to have right leg ischemic leg. Scrotal abscess noted.    3/20. OPERATION:   1. Right groin exploration  2. Right iliofemoral artery embolectomy with primary closure of arteriotomy  3. Right deep femoral artery embolectomy   4. Right lower extremity diagnostic incision evaluation    3/20. OPERATION: Right scrotal incision and drainage    Findings:    03/24/20 1430   Skin Integrity   Skin Integrity Excoriation   Location groin, folds   Skin Integrity Site 2   Skin Integrity Location 2   (dark purple hard discolored area)   Location 2 right anterior thigh   Skin Integrity Site 3   Skin Integrity Location 3   (dry flaky skin, vascular discoloration)    Location 3 LLE   Incision 03/20/20 Groin Right   Date First Assessed/Time First Assessed: 03/20/20 2147   Primary Wound Type:  Incision  Location: Groin  Wound Location Orientation: Right   Wound Assessment   (skin peeling)   Nadia-wound Assessment Intact   Closure Surface sutures   Drainage Amount None   Dressing/Treatment Open to air   Incision 03/20/20 Thigh Anterior;Right   Date First Assessed: 03/20/20   Present on Hospital Admission: No  Location: Thigh  Wound Location Orientation: Anterior;Right   Nadia-wound Assessment Intact   Closure Sutures   Drainage Amount None   Dressing/Treatment Open to air   Incision 03/20/20 Scrotum   Date First Assessed: 03/20/20   Location: Scrotum   Wound Assessment   (penrose drain)   Nadia-wound Assessment Intact   Closure Open to air   Drainage Amount Scant   Drainage Description Serosanguinous   Odor None   nursing reports buttocks, coccyx intact    Plan:  Antifungal  Will need continued preventative care    Daljit Buck

## 2020-03-24 NOTE — PLAN OF CARE
Problem: Pain:  Goal: Pain level will decrease  Description: Pain level will decrease  Outcome: Met This Shift  Goal: Control of acute pain  Description: Control of acute pain  Outcome: Met This Shift  Goal: Control of chronic pain  Description: Control of chronic pain  Outcome: Met This Shift     Problem: Falls - Risk of:  Goal: Will remain free from falls  Description: Will remain free from falls  Outcome: Met This Shift  Goal: Absence of physical injury  Description: Absence of physical injury  Outcome: Met This Shift     Problem: Discharge Planning:  Goal: Absence of nausea/vomiting  Description: Absence of nausea/vomiting  Outcome: Met This Shift     Problem: Fluid Volume - Risk of, Imbalance:  Goal: Absence of imbalanced fluid volume signs and symptoms  Description: Absence of imbalanced fluid volume signs and symptoms  Outcome: Met This Shift  Goal: Will show no signs and symptoms of excessive bleeding  Description: Will show no signs and symptoms of excessive bleeding  Outcome: Met This Shift  Goal: Ability to maintain a balanced intake and output will improve  Description: Ability to maintain a balanced intake and output will improve  Outcome: Met This Shift  Goal: Absence of angry outbursts  Description: Hemoglobin within specified parameters  Outcome: Met This Shift     Problem: Tissue Perfusion - Cardiopulmonary, Altered:  Goal: Circulatory function within specified parameters  Description: Circulatory function within specified parameters  Outcome: Met This Shift  Goal: Will show no evidence of cardiac arrhythmias  Description: Will show no evidence of cardiac arrhythmias  Outcome: Met This Shift     Problem: Venous Thromboembolism - Risk of:  Goal: Will show no signs or symptoms of venous thromboembolism  Description: Will show no signs or symptoms of venous thromboembolism  Outcome: Met This Shift     Problem: Musculor/Skeletal Functional Status  Goal: Absence of falls  Outcome: Met This Shift Problem: Increased nutrient needs (NI-5.1)  Goal: Food and/or Nutrient Delivery  Description: Individualized approach for food/nutrient provision.   3/24/2020 1330 by Kevin Arellano RD, LD  Outcome: Met This Shift     Problem: Skin Integrity:  Goal: Will show no infection signs and symptoms  Description: Will show no infection signs and symptoms  Outcome: Met This Shift  Goal: Absence of new skin breakdown  Description: Absence of new skin breakdown  Outcome: Met This Shift

## 2020-03-24 NOTE — PROGRESS NOTES
Nutrition Assessment    Type and Reason for Visit: Initial(LOS)    Nutrition Recommendations: Continue current diet, Start ONS    Nutrition Assessment: Pt at nutritional risk d/t increased nutrient needs 2/2 wound healing s/p I&D scrotal abscess. Noted AKA. Will add Ensure HP & Antoni BID to aid in recovery. Malnutrition Assessment:  · Malnutrition Status: Insufficient data  · Findings of the 6 clinical characteristics of malnutrition (Minimum of 2 out of 6 clinical characteristics is required to make the diagnosis of moderate or severe Protein Calorie Malnutrition based on AND/ASPEN Guidelines):  1. Energy Intake-Greater than 75% of estimated energy requirement  2. Weight Loss-Unable to assess   3. Fat Loss-Unable to assess  4. Muscle Loss-Unable to assess  5. Fluid Accumulation-No significant fluid accumulation  6.   Strength-Not measured    Nutrition Risk Level: Low    Nutrient Needs:  · Estimated Daily Total Kcal: 1678-9366(MSJ REE 2123 x 1.2 SF)  · Estimated Daily Protein (g): 130-150(1.5-1.8 g/kg)  · Estimated Daily Total Fluid (ml/day): per critical care     Nutrition Diagnosis:   · Problem: Increased nutrient needs  · Etiology: related to Increased demand for energy/nutrients     Signs and symptoms:  as evidenced by Presence of wounds    Objective Information:  · Nutrition-Focused Physical Findings: +I/O's 8L, +1/+3 edema, active BS     · Wound Type: Multiple, Surgical Wound(x 3)     · Current Nutrition Therapies:  · Oral Diet Orders: Carb Control 4 Carbs/Meal   · Oral Diet intake: %(most meals per doc flow)     · Anthropometric Measures:  · Ht: 6' 2\" (188 cm)   · Current Body Wt: 275 lb (124.7 kg)(no method UTO new wt bedscale not working)  · Usual Body Wt: (UTO no actual EMR hx on file )  · % Weight Change: unable to properly assess   · Ideal Body Wt: 171 lb (77.6 kg)(adjust for AKA ), % Ideal Body 161%  · Body weight adjusted for Unilateral, AKA  · BMI Classification: BMI 35.0 - 39.9

## 2020-03-24 NOTE — PROGRESS NOTES
Physical Therapy    [facility]    NAME: Angle Cook  : 1955  MRN: [id number]    Date of Service: 3/24/2020  Chart reviewed. Attempted treatment session at this time; however, pt was undergoing bedside procedure. Will re-attempt as able.     Evangelista Damon, PT, DPT  [id number]

## 2020-03-24 NOTE — PROGRESS NOTES
s/p AKA in 2018. Pt went to the OR with Vascular Surgery 3/20 s/p R groin exploration with iliofemoral artery and deep femoral artery embolectomy. Additionally, pt complaining of increased warmth and pain of his R scrotum s/p incision and drainage of R scrotum with general surgery  · Empiric antibiotics initiated - Cefepime day #2 and Vancomycin day #5  · ID following  · Vanco trough 24.3 mcg/mL yesterday; Goal trough level = 15-20 mcg/mL  · SCr 1.7 today/ UOP 0.6 mL/kg/hr    Plan:  · Continue Vancomycin 1750 mg IV q24h   · Will continue to order Vanco trough levels and will adjust dose as needed  · Will monitor renal function closely    Will continue to follow.      Irvin CanelaD, BCPS, BCCCP  3/24/2020  9:50 AM  Pager: [phone]

## 2020-03-24 NOTE — PROGRESS NOTES
[address] [place] [address] [facility]  Progress Note      Chief Complaint   Patient presents with    Hip Pain     right hip and scrotal pain, denies injury     Hyperglycemia     \"HI\" per EMS       SUBJECTIVE:  Patient is tolerating medications. No reported adverse drug reactions. No nausea, vomiting, diarrhea. Complains of chronic constipation otherwise is eating well feels better and notes that there is less pain in the scrotal and leg. Review of systems:  As stated above in the chief complaint, otherwise negative.     Medications:  Scheduled Meds:   tamsulosin  0.4 mg Oral Daily    vancomycin  1,750 mg Intravenous Q24H    miconazole  5 g/m2 Topical BID    cefepime  2 g Intravenous Q24H    sodium chloride  25 mL Intravenous Q24H    insulin lispro  0-18 Units Subcutaneous TID WC    insulin lispro  0-9 Units Subcutaneous Nightly    lidocaine PF  5 mL Intradermal Once    heparin flush  3 mL Intravenous 2 times per day    apixaban  5 mg Oral BID    insulin glargine  25 Units Subcutaneous BID    amLODIPine  10 mg Oral Daily    aspirin  81 mg Oral Daily    atorvastatin  10 mg Oral Daily    cilostazol  100 mg Oral BID    cloNIDine  0.1 mg Oral TID    DULoxetine  120 mg Oral QAM    lisinopril  10 mg Oral Daily    lubiprostone  24 mcg Oral BID WC    metoprolol  100 mg Oral BID    pregabalin  200 mg Oral TID    spironolactone  50 mg Oral QAM    sodium chloride flush  10 mL Intravenous 2 times per day    pantoprazole  40 mg Oral QAM AC     Continuous Infusions:   sodium chloride 100 mL/hr at 03/24/20 0631    dextrose       PRN Meds:sodium chloride flush, heparin flush, sodium chloride flush, potassium chloride **OR** potassium alternative oral replacement **OR** potassium chloride, acetaminophen **OR** acetaminophen, magnesium hydroxide, promethazine **OR** ondansetron, oxyCODONE-acetaminophen **OR** oxyCODONE-acetaminophen, labetalol, hydrALAZINE, glucose, dextrose, glucagon (rDNA), dextrose, sodium chloride flush    OBJECTIVE:  /62   Pulse 71   Temp 96.8 °F (36 °C)   Resp 15   Ht 6' 2\" (1.88 m)   Wt 275 lb (124.7 kg)   SpO2 98%   BMI 35.31 kg/m²   Temp  Av.9 °F (36.6 °C)  Min: 96.8 °F (36 °C)  Max: 98.7 °F (37.1 °C)  Constitutional: The patient is awake, alert, and oriented. Skin: Warm and dry. No rashes were noted. HEENT: Round and reactive pupils. Moist mucous membranes. No ulcerations or thrush. Neck: Supple to movements. Chest: No use of accessory muscles to breathe. Symmetrical expansion. No wheezing, crackles or rhonchi. Cardiovascular: S1 and S2 are rhythmic and regular. No murmurs appreciated. Abdomen: Positive bowel sounds to auscultation. Benign to palpation. No masses felt. No hepatosplenomegaly. Genitourinary:   Male; less tenderness to the right scrotum Penrose drain  Extremities: No clubbing, no cyanosis, less edema. The AKA appears to be less edematous and is perfusing well.   Lines: peripheral  Art line 3/20/2020 right radial    Laboratory and Tests Review:  Lab Results   Component Value Date    WBC 14.7 (H) 2020    WBC 18.4 (H) 2020    WBC 27.2 (H) 2020    HGB 10.2 (L) 2020    HCT 31.8 (L) 2020    MCV 81.7 2020     2020     Lab Results   Component Value Date    NEUTROABS 19.26 (H) 2020    NEUTROABS 6.83 2018    NEUTROABS 6.34 2018     No results found for: [facility]  Lab Results   Component Value Date    ALT 32 2020    AST 84 (H) 2020    ALKPHOS 119 2020    BILITOT 0.4 2020     Lab Results   Component Value Date     2020    K 4.1 2020    K 4.9 2020     2020    CO2 22 2020    BUN 26 2020    CREATININE 1.7 2020    CREATININE 1.9 2020    CREATININE 1.9 2020    GFRAA 50 2020    LABGLOM 50 2020    GLUCOSE 264 2020    PROT 5.6 2020    LABALBU 1.9 2020    CALCIUM 8.1 03/24/2020    BILITOT 0.4 03/24/2020    ALKPHOS 119 03/24/2020    AST 84 03/24/2020    ALT 32 03/24/2020     Lab Results   Component Value Date    CRP 10.6 (H) 10/24/2018    CRP 2.1 (H) 06/25/2018    CRP 7.4 (H) 06/11/2018     Lab Results   Component Value Date    SEDRATE 128 (H) 10/24/2018    SEDRATE 77 (H) 06/25/2018    SEDRATE 138 (H) 06/11/2018     Radiology:      Microbiology:   Lab Results   Component Value Date    BC 24 Hours- no growth 03/20/2020    BC 5 Days- no growth 10/26/2018    BC 5 Days- no growth 10/25/2018    ORG Staphylococcus hominis ssp hominis 03/20/2020    ORG Staphylococcus epidermidis 03/20/2020    ORG Staphylococcus aureus 10/24/2018     Lab Results   Component Value Date    BLOODCULT2 24 Hours- no growth 03/20/2020    BLOODCULT2 5 Days- no growth 10/26/2018    BLOODCULT2 5 Days- no growth 10/25/2018    ORG Staphylococcus hominis ssp hominis 03/20/2020    ORG Staphylococcus epidermidis 03/20/2020    ORG Staphylococcus aureus 10/24/2018     WOUND/ABSCESS   Date Value Ref Range Status   05/22/2018 Physician requested workup (A)  Final   05/22/2018 Moderate growth  Final   05/22/2018 Heavy growth  Final   05/22/2018 Light growth  Final     No results found for: RESPSMEAR  No results found for: MPNEUMO, CLAMYDCU, LABLEGI, AFBCX, FUNGSM, LABFUNG  No results found for: CULTRESP  No results found for: CXCATHTIP  No results found for: BFCS  Culture Surgical   Date Value Ref Range Status   03/20/2020 (A)  Final    Growth present, evaluating for:  Mixed Gram positive organisms  Mixed radha isolated. Further workup and sensitivity testing  is not routinely indicated and will not be performed.   Mixed radha isolated includes:  Mixed Coagulase negative Staph species  Mixed morphologies Corynebacteria  Physician requested workup     03/20/2020   Preliminary    Light growth  Identification and sensitivity to follow     03/20/2020 Light growth  Final     Urine Culture, Routine   Date Value Ref Range

## 2020-03-24 NOTE — PROGRESS NOTES
OCCUPATIONAL THERAPY    Date:3/24/2020  Patient Name: Courtney Louis  MRN: [id number]  : 1955  Room: [facility]    Chart reviewed. Attempted OT session this am. Pt unavailable-undergoing procedure bedside. Will re-attempt as able.      Arsh Colmenares, OT R/L [id number]

## 2020-03-24 NOTE — PROGRESS NOTES
Inpatient Cardiology Progress note     PATIENT IS BEING FOLLOWED FOR: New onset Atrial Fibrillation     Hillary Lopez is a 60year old  male who is known to Dr. Hattie Wall.      SUBJECTIVE: Denies Palpitations, CP or SOB  OBJECTIVE: No apparent distress     ROS:  Consist: Denies fevers, chills or night sweats  Heart: Denies chest pain, palpitations, lightheadedness, dizziness or syncope  Lungs: Denies SOB, cough, wheezing, orthopnea or PND  GI: Denies abdominal pain, vomiting or diarrhea    PHYSICAL EXAM:   BP (!) 142/52   Pulse 65   Temp 98.3 °F (36.8 °C) (Temporal)   Resp 22   Ht 6' 2\" (1.88 m)   Wt 275 lb (124.7 kg)   SpO2 94%   BMI 35.31 kg/m²    CONST:  Well developed, morbidly obese AA male who appears of stated age. Awake, alert, cooperative, no apparent distress  HEENT:   Head- Normocephalic, atraumatic   Eyes- Conjunctivae pink, anicteric  Throat- Oral mucosa pink and moist  Neck-  No stridor, trachea midline, no jugular venous distention. No adenopathy   CHEST: Chest symmetrical and non-tender to palpation. No accessory muscle use or intercostal retractions  RESPIRATORY: Lung sounds - clear throughout fields   CARDIOVASCULAR:     No carotid bruit  Heart Inspection- shows no noted pulsations  Heart Palpation- no heaves or thrills; PMI is non-displaced   Heart Ausculation- Regular rate and rhythm, systolic murmur. No s3, s4 or rub   PV: No lower extremity edema. No varicosities. Pedal pulse palpable LLE, Right AKA noted  ABDOMEN: Soft, obese, non-tender to light palpation. Bowel sounds present. No palpable masses no organomegaly; no abdominal bruit  MS: Good muscle strength and tone. No atrophy or abnormal movements. : Deferred  SKIN: Warm and dry. Venous stasis skin changes left lower extremity below the knee. no statis dermatitis or ulcers   NEURO / PSYCH: Oriented to person, place and time. Speech clear and appropriate. Follows all commands.  Pleasant affect 03/21/2020   APTT:  Recent Labs     03/21/20  0930 03/22/20  0630   APTT 64.3* 68.6*     LIVER PROFILE:  Recent Labs     03/23/20  0415 03/24/20  0423   * 84*   ALT 31 32   LABALBU 2.2* 1.9*     03/20/2020 CXR:   1. Normal chest with improved aeration of the lungs. 2. Some gas is identified in the ventral 5 soft tissues along the proximal femur, suggesting gas in venous structures and along fascial planes. 03/20/2020 Right Femur XRay:   1. Normal chest with improved aeration of the lungs. 2. Some gas is identified in the ventral 5 soft tissues along the proximal femur, suggesting gas in venous structures and along fascial planes    03/20/2020 CTA of Abdominal Aorta: There is complete thrombotic occlusion of the origin of the right common femoral artery with some gas in the proximal superficial femoral artery in the upper thigh, with gas extending into the quadriceps muscles of the upper thigh amputation stump. There is a fluid collection in the right hemiscrotal subcutaneous fat, which is not associated with gangrenous gas or prominent scrotal wall thickening. Atherosclerotic runoff findings are noted in the left leg, which has arterial patency to the ankle, primarily in the posterior tibial and peroneal distributions. 03/23/2020 LUE Ultrasound:   No evidence for deep venous thrombosis    03/23/2020 JUSTO ( Dr. Tri Santiago  ):   Summary   No source of embolus found. No intra-cardiac mass, thrombus or vegetations. Overall ejection fraction is normal.   Normal right ventricle structure and function. Mild mitral regurgitation is present. Telemetry: SR    12 lead EKG 3/23/2020: SR. Borderline first degree AV block. Prolonged QT interval. Non specific T wave abnormality      ASSESSMENT:   1. There is no documentation of A Fib. I was informed by Dr. Lashae Garces that the patient was definitely in Afib when he saw him in the ED. No cardiac source of embolus on JUSTO done this am  2.  Right scrotal swelling/infection/s/p I&D   3. Acute right lower extremity limb ischemia s/p Right iliofemoral artery embolectomy with primary closure of arteriotomy. Right deep femoral artery embolectomy 03/21/2020  4. Anticoagulated on Eliquis   5. Echo 2018: Mild LVH, Normal EF, and no significant valvular abnormalities  6. Negative stress test in 05/2018  7. HTN: Stable   8. HLD, on statin   9. DM   10. CKD: stable kidney function   11. Morbid obesity   12. RAGHU, on CPAP  13. Tobacco abuse   14. PAD s/p right AKA 11/2018  15. Hx of RLE DVT in 2018 treated at that time with Coumadin    16. Leukocytosis, trending down   17. Hyponatremia, resolved   18. Basically wheelchair bound   19. Hypoalbuminemia  20. Elevated AST, trending down    21. Anemia       PLAN:  1. Continue current cardiac medications ( including oral anticoagulation and BB therapy)  2. Continue to monitor while in the hospital   3. Consider a 1 month event monitor ( or a Linq loop recorder ) at the end of the oral anticoagulation period ( in 6 months )  prior to discontinuing oral anticoagulation if no recurrent a fib is documented in the interim. If A fib is documented either way then he has to be on oral anticoagulation indefinitely. 4. Rest as per the primary service and other consultants  5. Cardiology will sign off.  Please call if needed    Electronically signed by Cecil Jacques MD on 3/24/2020 at 8:20 AM

## 2020-03-25 LAB
ALBUMIN SERPL-MCNC: 2.1 G/DL (ref 3.5–5.2)
ALP BLD-CCNC: 162 U/L (ref 40–129)
ALT SERPL-CCNC: 39 U/L (ref 0–40)
ANION GAP SERPL CALCULATED.3IONS-SCNC: 10 MMOL/L (ref 7–16)
ANION GAP SERPL CALCULATED.3IONS-SCNC: 10 MMOL/L (ref 7–16)
AST SERPL-CCNC: 80 U/L (ref 0–39)
BILIRUB SERPL-MCNC: 0.4 MG/DL (ref 0–1.2)
BLOOD CULTURE, ROUTINE: NORMAL
BUN BLDV-MCNC: 24 MG/DL (ref 8–23)
BUN BLDV-MCNC: 24 MG/DL (ref 8–23)
CALCIUM IONIZED: 1.26 MMOL/L (ref 1.15–1.33)
CALCIUM SERPL-MCNC: 8.3 MG/DL (ref 8.6–10.2)
CALCIUM SERPL-MCNC: 8.4 MG/DL (ref 8.6–10.2)
CHLORIDE BLD-SCNC: 103 MMOL/L (ref 98–107)
CHLORIDE BLD-SCNC: 103 MMOL/L (ref 98–107)
CO2: 21 MMOL/L (ref 22–29)
CO2: 21 MMOL/L (ref 22–29)
CREAT SERPL-MCNC: 1.7 MG/DL (ref 0.7–1.2)
CREAT SERPL-MCNC: 1.7 MG/DL (ref 0.7–1.2)
CULTURE, BLOOD 2: NORMAL
GFR AFRICAN AMERICAN: 50
GFR AFRICAN AMERICAN: 50
GFR NON-AFRICAN AMERICAN: 50 ML/MIN/1.73
GFR NON-AFRICAN AMERICAN: 50 ML/MIN/1.73
GLUCOSE BLD-MCNC: 244 MG/DL (ref 74–99)
GLUCOSE BLD-MCNC: 245 MG/DL (ref 74–99)
HCT VFR BLD CALC: 32.8 % (ref 37–54)
HEMOGLOBIN: 10.4 G/DL (ref 12.5–16.5)
MAGNESIUM: 1.9 MG/DL (ref 1.6–2.6)
MCH RBC QN AUTO: 25.9 PG (ref 26–35)
MCHC RBC AUTO-ENTMCNC: 31.7 % (ref 32–34.5)
MCV RBC AUTO: 81.8 FL (ref 80–99.9)
METER GLUCOSE: 201 MG/DL (ref 74–99)
METER GLUCOSE: 209 MG/DL (ref 74–99)
METER GLUCOSE: 224 MG/DL (ref 74–99)
METER GLUCOSE: 285 MG/DL (ref 74–99)
PDW BLD-RTO: 14.2 FL (ref 11.5–15)
PHOSPHORUS: 1.9 MG/DL (ref 2.5–4.5)
PLATELET # BLD: 184 E9/L (ref 130–450)
PMV BLD AUTO: 12.7 FL (ref 7–12)
POTASSIUM REFLEX MAGNESIUM: 4.1 MMOL/L (ref 3.5–5)
POTASSIUM SERPL-SCNC: 4.1 MMOL/L (ref 3.5–5)
RBC # BLD: 4.01 E12/L (ref 3.8–5.8)
SODIUM BLD-SCNC: 134 MMOL/L (ref 132–146)
SODIUM BLD-SCNC: 134 MMOL/L (ref 132–146)
TOTAL PROTEIN: 6.3 G/DL (ref 6.4–8.3)
VANCOMYCIN TROUGH: 19.8 MCG/ML (ref 5–16)
WBC # BLD: 13.2 E9/L (ref 4.5–11.5)

## 2020-03-25 PROCEDURE — 2580000003 HC RX 258: Performed by: SURGERY

## 2020-03-25 PROCEDURE — 80202 ASSAY OF VANCOMYCIN: CPT

## 2020-03-25 PROCEDURE — 6370000000 HC RX 637 (ALT 250 FOR IP): Performed by: STUDENT IN AN ORGANIZED HEALTH CARE EDUCATION/TRAINING PROGRAM

## 2020-03-25 PROCEDURE — 2580000003 HC RX 258: Performed by: STUDENT IN AN ORGANIZED HEALTH CARE EDUCATION/TRAINING PROGRAM

## 2020-03-25 PROCEDURE — 2140000000 HC CCU INTERMEDIATE R&B

## 2020-03-25 PROCEDURE — 6370000000 HC RX 637 (ALT 250 FOR IP): Performed by: NURSE PRACTITIONER

## 2020-03-25 PROCEDURE — 84100 ASSAY OF PHOSPHORUS: CPT

## 2020-03-25 PROCEDURE — 97535 SELF CARE MNGMENT TRAINING: CPT

## 2020-03-25 PROCEDURE — 6370000000 HC RX 637 (ALT 250 FOR IP): Performed by: PHYSICIAN ASSISTANT

## 2020-03-25 PROCEDURE — 97530 THERAPEUTIC ACTIVITIES: CPT

## 2020-03-25 PROCEDURE — 85027 COMPLETE CBC AUTOMATED: CPT

## 2020-03-25 PROCEDURE — 2580000003 HC RX 258: Performed by: PHYSICIAN ASSISTANT

## 2020-03-25 PROCEDURE — 82330 ASSAY OF CALCIUM: CPT

## 2020-03-25 PROCEDURE — 83735 ASSAY OF MAGNESIUM: CPT

## 2020-03-25 PROCEDURE — 36415 COLL VENOUS BLD VENIPUNCTURE: CPT

## 2020-03-25 PROCEDURE — 82962 GLUCOSE BLOOD TEST: CPT

## 2020-03-25 PROCEDURE — 94660 CPAP INITIATION&MGMT: CPT

## 2020-03-25 PROCEDURE — 6370000000 HC RX 637 (ALT 250 FOR IP): Performed by: SURGERY

## 2020-03-25 PROCEDURE — 6360000002 HC RX W HCPCS: Performed by: INTERNAL MEDICINE

## 2020-03-25 PROCEDURE — 80048 BASIC METABOLIC PNL TOTAL CA: CPT

## 2020-03-25 PROCEDURE — 2500000003 HC RX 250 WO HCPCS: Performed by: STUDENT IN AN ORGANIZED HEALTH CARE EDUCATION/TRAINING PROGRAM

## 2020-03-25 PROCEDURE — 6370000000 HC RX 637 (ALT 250 FOR IP): Performed by: INTERNAL MEDICINE

## 2020-03-25 PROCEDURE — 80053 COMPREHEN METABOLIC PANEL: CPT

## 2020-03-25 RX ORDER — BISACODYL 10 MG
10 SUPPOSITORY, RECTAL RECTAL DAILY
Status: DISCONTINUED | OUTPATIENT
Start: 2020-03-25 | End: 2020-03-27 | Stop reason: HOSPADM

## 2020-03-25 RX ORDER — DOCUSATE SODIUM 100 MG/1
100 CAPSULE, LIQUID FILLED ORAL 2 TIMES DAILY
Status: DISCONTINUED | OUTPATIENT
Start: 2020-03-25 | End: 2020-03-27 | Stop reason: HOSPADM

## 2020-03-25 RX ORDER — LINEZOLID 600 MG/1
600 TABLET, FILM COATED ORAL EVERY 12 HOURS SCHEDULED
Qty: 28 TABLET | Refills: 0 | Status: SHIPPED | OUTPATIENT
Start: 2020-03-25 | End: 2020-04-08

## 2020-03-25 RX ORDER — MAGNESIUM SULFATE 1 G/100ML
1 INJECTION INTRAVENOUS ONCE
Status: COMPLETED | OUTPATIENT
Start: 2020-03-25 | End: 2020-03-25

## 2020-03-25 RX ORDER — METOPROLOL SUCCINATE 25 MG/1
25 TABLET, EXTENDED RELEASE ORAL PRN
Status: DISCONTINUED | OUTPATIENT
Start: 2020-03-25 | End: 2020-03-27 | Stop reason: HOSPADM

## 2020-03-25 RX ORDER — INSULIN GLARGINE 100 [IU]/ML
30 INJECTION, SOLUTION SUBCUTANEOUS 2 TIMES DAILY
Status: DISCONTINUED | OUTPATIENT
Start: 2020-03-25 | End: 2020-03-27 | Stop reason: HOSPADM

## 2020-03-25 RX ORDER — LINEZOLID 600 MG/1
600 TABLET, FILM COATED ORAL EVERY 12 HOURS SCHEDULED
Status: DISCONTINUED | OUTPATIENT
Start: 2020-03-25 | End: 2020-03-27 | Stop reason: HOSPADM

## 2020-03-25 RX ORDER — ZOLPIDEM TARTRATE 5 MG/1
5 TABLET ORAL NIGHTLY PRN
Status: DISCONTINUED | OUTPATIENT
Start: 2020-03-25 | End: 2020-03-27 | Stop reason: HOSPADM

## 2020-03-25 RX ADMIN — Medication 10 ML: at 09:20

## 2020-03-25 RX ADMIN — CILOSTAZOL 100 MG: 100 TABLET ORAL at 09:09

## 2020-03-25 RX ADMIN — PREGABALIN 200 MG: 100 CAPSULE ORAL at 14:52

## 2020-03-25 RX ADMIN — CILOSTAZOL 100 MG: 100 TABLET ORAL at 21:18

## 2020-03-25 RX ADMIN — METOPROLOL TARTRATE 100 MG: 50 TABLET, FILM COATED ORAL at 09:08

## 2020-03-25 RX ADMIN — DOCUSATE SODIUM 100 MG: 100 CAPSULE, LIQUID FILLED ORAL at 09:10

## 2020-03-25 RX ADMIN — INSULIN LISPRO 3 UNITS: 100 INJECTION, SOLUTION INTRAVENOUS; SUBCUTANEOUS at 21:22

## 2020-03-25 RX ADMIN — INSULIN LISPRO 6 UNITS: 100 INJECTION, SOLUTION INTRAVENOUS; SUBCUTANEOUS at 07:21

## 2020-03-25 RX ADMIN — APIXABAN 5 MG: 5 TABLET, FILM COATED ORAL at 09:10

## 2020-03-25 RX ADMIN — PREGABALIN 200 MG: 100 CAPSULE ORAL at 21:18

## 2020-03-25 RX ADMIN — CLONIDINE HYDROCHLORIDE 0.1 MG: 0.1 TABLET ORAL at 09:09

## 2020-03-25 RX ADMIN — MICONAZOLE NITRATE 13 G: 20.6 POWDER TOPICAL at 09:14

## 2020-03-25 RX ADMIN — APIXABAN 5 MG: 5 TABLET, FILM COATED ORAL at 21:18

## 2020-03-25 RX ADMIN — DULOXETINE HYDROCHLORIDE 120 MG: 60 CAPSULE, DELAYED RELEASE ORAL at 09:10

## 2020-03-25 RX ADMIN — PREGABALIN 200 MG: 100 CAPSULE ORAL at 09:11

## 2020-03-25 RX ADMIN — LUBIPROSTONE 24 MCG: 24 CAPSULE, GELATIN COATED ORAL at 17:09

## 2020-03-25 RX ADMIN — OXYCODONE AND ACETAMINOPHEN 2 TABLET: 5; 325 TABLET ORAL at 17:09

## 2020-03-25 RX ADMIN — OXYCODONE AND ACETAMINOPHEN 2 TABLET: 5; 325 TABLET ORAL at 21:19

## 2020-03-25 RX ADMIN — ASPIRIN 81 MG CHEWABLE TABLET 81 MG: 81 TABLET CHEWABLE at 09:08

## 2020-03-25 RX ADMIN — LISINOPRIL 10 MG: 10 TABLET ORAL at 09:08

## 2020-03-25 RX ADMIN — SODIUM CHLORIDE: 9 INJECTION, SOLUTION INTRAVENOUS at 17:11

## 2020-03-25 RX ADMIN — OXYCODONE AND ACETAMINOPHEN 2 TABLET: 5; 325 TABLET ORAL at 11:33

## 2020-03-25 RX ADMIN — CLONIDINE HYDROCHLORIDE 0.1 MG: 0.1 TABLET ORAL at 21:18

## 2020-03-25 RX ADMIN — INSULIN LISPRO 6 UNITS: 100 INJECTION, SOLUTION INTRAVENOUS; SUBCUTANEOUS at 11:36

## 2020-03-25 RX ADMIN — MICONAZOLE NITRATE 13 G: 20.6 POWDER TOPICAL at 21:22

## 2020-03-25 RX ADMIN — CLONIDINE HYDROCHLORIDE 0.1 MG: 0.1 TABLET ORAL at 14:52

## 2020-03-25 RX ADMIN — INSULIN LISPRO 9 UNITS: 100 INJECTION, SOLUTION INTRAVENOUS; SUBCUTANEOUS at 17:09

## 2020-03-25 RX ADMIN — LUBIPROSTONE 24 MCG: 24 CAPSULE, GELATIN COATED ORAL at 09:09

## 2020-03-25 RX ADMIN — MAGNESIUM SULFATE HEPTAHYDRATE 1 G: 1 INJECTION, SOLUTION INTRAVENOUS at 04:23

## 2020-03-25 RX ADMIN — ATORVASTATIN CALCIUM 10 MG: 10 TABLET, FILM COATED ORAL at 09:08

## 2020-03-25 RX ADMIN — OXYCODONE AND ACETAMINOPHEN 2 TABLET: 5; 325 TABLET ORAL at 06:14

## 2020-03-25 RX ADMIN — TAMSULOSIN HYDROCHLORIDE 0.4 MG: 0.4 CAPSULE ORAL at 09:11

## 2020-03-25 RX ADMIN — SODIUM CHLORIDE: 9 INJECTION, SOLUTION INTRAVENOUS at 03:45

## 2020-03-25 RX ADMIN — BISACODYL 10 MG: 5 TABLET, COATED ORAL at 09:08

## 2020-03-25 RX ADMIN — INSULIN GLARGINE 30 UNITS: 100 INJECTION, SOLUTION SUBCUTANEOUS at 21:22

## 2020-03-25 RX ADMIN — INSULIN GLARGINE 30 UNITS: 100 INJECTION, SOLUTION SUBCUTANEOUS at 09:11

## 2020-03-25 RX ADMIN — LINEZOLID 600 MG: 600 TABLET, FILM COATED ORAL at 21:18

## 2020-03-25 RX ADMIN — PANTOPRAZOLE SODIUM 40 MG: 40 TABLET, DELAYED RELEASE ORAL at 06:08

## 2020-03-25 RX ADMIN — SPIRONOLACTONE 50 MG: 25 TABLET ORAL at 09:10

## 2020-03-25 RX ADMIN — SODIUM PHOSPHATE, MONOBASIC, MONOHYDRATE 24 MMOL: 276; 142 INJECTION, SOLUTION INTRAVENOUS at 14:53

## 2020-03-25 RX ADMIN — AMLODIPINE BESYLATE 10 MG: 10 TABLET ORAL at 09:09

## 2020-03-25 RX ADMIN — METOPROLOL TARTRATE 100 MG: 50 TABLET, FILM COATED ORAL at 21:18

## 2020-03-25 ASSESSMENT — PAIN DESCRIPTION - DESCRIPTORS
DESCRIPTORS: ACHING;DISCOMFORT;SORE
DESCRIPTORS: ACHING;DISCOMFORT;SORE
DESCRIPTORS: ACHING;DISCOMFORT

## 2020-03-25 ASSESSMENT — PAIN SCALES - GENERAL
PAINLEVEL_OUTOF10: 3
PAINLEVEL_OUTOF10: 8
PAINLEVEL_OUTOF10: 3
PAINLEVEL_OUTOF10: 0
PAINLEVEL_OUTOF10: 7
PAINLEVEL_OUTOF10: 3
PAINLEVEL_OUTOF10: 0
PAINLEVEL_OUTOF10: 9
PAINLEVEL_OUTOF10: 7
PAINLEVEL_OUTOF10: 3

## 2020-03-25 ASSESSMENT — PAIN DESCRIPTION - PROGRESSION: CLINICAL_PROGRESSION: GRADUALLY WORSENING

## 2020-03-25 ASSESSMENT — PAIN DESCRIPTION - FREQUENCY: FREQUENCY: CONTINUOUS

## 2020-03-25 ASSESSMENT — PAIN DESCRIPTION - PAIN TYPE
TYPE: SURGICAL PAIN

## 2020-03-25 ASSESSMENT — PAIN DESCRIPTION - LOCATION
LOCATION: GROIN
LOCATION: GROIN;SCROTUM
LOCATION: GROIN

## 2020-03-25 ASSESSMENT — PAIN DESCRIPTION - ORIENTATION: ORIENTATION: RIGHT

## 2020-03-25 ASSESSMENT — PAIN DESCRIPTION - ONSET: ONSET: ON-GOING

## 2020-03-25 NOTE — PROGRESS NOTES
Vascular Surgery Progress Note    Pt is being seen in f/u today regarding acute right lower extremity limb ischemia    Subjective  He states he feels well his pains been stable.   No complaints,  Tolerating diet, + UOP, +flatus, + BM, non sustained Vtach overnight, asymptomatic    Current Medications:    sodium chloride 100 mL/hr at 03/25/20 0345    dextrose        metoprolol succinate, sodium chloride flush, heparin flush, sodium chloride flush, potassium chloride **OR** potassium alternative oral replacement **OR** potassium chloride, acetaminophen **OR** acetaminophen, magnesium hydroxide, promethazine **OR** ondansetron, oxyCODONE-acetaminophen **OR** oxyCODONE-acetaminophen, labetalol, hydrALAZINE, glucose, dextrose, glucagon (rDNA), dextrose, sodium chloride flush    docusate sodium  100 mg Oral BID    bisacodyl  10 mg Rectal Daily    bisacodyl  10 mg Oral Daily    tamsulosin  0.4 mg Oral Daily    vancomycin  1,750 mg Intravenous Q24H    miconazole  5 g/m2 Topical BID    insulin lispro  0-18 Units Subcutaneous TID WC    insulin lispro  0-9 Units Subcutaneous Nightly    lidocaine PF  5 mL Intradermal Once    apixaban  5 mg Oral BID    insulin glargine  25 Units Subcutaneous BID    amLODIPine  10 mg Oral Daily    aspirin  81 mg Oral Daily    atorvastatin  10 mg Oral Daily    cilostazol  100 mg Oral BID    cloNIDine  0.1 mg Oral TID    DULoxetine  120 mg Oral QAM    lisinopril  10 mg Oral Daily    lubiprostone  24 mcg Oral BID WC    metoprolol  100 mg Oral BID    pregabalin  200 mg Oral TID    spironolactone  50 mg Oral QAM    sodium chloride flush  10 mL Intravenous 2 times per day    pantoprazole  40 mg Oral QAM AC        PHYSICAL EXAM:    BP (!) 106/47   Pulse 67   Temp 98 °F (36.7 °C) (Temporal)   Resp 16   Ht 6' 2\" (1.88 m)   Wt 275 lb (124.7 kg)   SpO2 98%   BMI 35.31 kg/m²     Intake/Output Summary (Last 24 hours) at 3/25/2020 0633  Last data filed at 3/25/2020 0536  Gross per 24 hour   Intake 4066 ml   Output 1675 ml   Net 2391 ml          Gen: awake, alert and oriented x3, no apparent distress  CVS: RR  Resp: On BiPAP overnight  Abd: Soft, non-tender, non-distended  R LE: Palpable femoral pulse, incision c/d/i, open to air, groin incision c/d/i, stump with bruising distally, no signs of infection, scrotum with penrose drain, surrounding tissue soft with no signs of cellulitis  L LE: palpable femoral. 5/5 strength, normal capillary refill    LABS:    Lab Results   Component Value Date    WBC 13.2 (H) 03/25/2020    HGB 10.4 (L) 03/25/2020    HCT 32.8 (L) 03/25/2020     03/25/2020    PROTIME 14.6 (H) 03/20/2020    INR 1.3 03/20/2020    APTT 68.6 (H) 03/22/2020    K 4.1 03/25/2020    K 4.1 03/25/2020    BUN 24 (H) 03/25/2020    BUN 24 (H) 03/25/2020    CREATININE 1.7 (H) 03/25/2020    CREATININE 1.7 (H) 03/25/2020       A/P  60 y.o. male S/P   1. Right groin exploration  2. Right iliofemoral artery embolectomy with primary closure of arteriotomy  3. Right deep femoral artery embolectomy   4.  Right lower extremity diagnostic incision and drainage    Continue to monitor post exam  Continue eliquis-at least 6 months, remainder of time pending event monitor per cardiology to evaluate for recurrent A fib  Cr stable, phos replacement ordered  Antibiotics per ID-continued vanc  Hgb stable  Leukocytosis improving to 13 from 14, afebrile overnight    Discussed with Dr. Meenakshi Schmidt seen and examined    Denies significant pain  abx per ID    Groin wound and thigh wound c/d/i    Roland Paris

## 2020-03-25 NOTE — PROGRESS NOTES
Pharmacy Consultation Note  (Antibiotic Dosing and Monitoring)    Initial consult date: 2020  Consulting physician: Dr Alexsander Brice  Drug: Vancomycin  Indication: R scrotal Abscess    Age/  Gender Height Weight IBW Dosing weight  Allergy Information   64 y.o./male 6' 2\" (188 cm) 275 lb (124.7 kg)     Ideal body weight: 82.2 kg (181 lb 3.5 oz)  Adjusted ideal body weight: 99.2 kg (218 lb 11.7 oz)  99.2kg  Patient has no known allergies. Temp (24hrs), Av.9 °F (36.6 °C), Min:96.8 °F (36 °C), Max:98.7 °F (37.1 °C)          Date  WBC BUN SCr CrCl  (mL/min) Drug/Dose Time   Given Level(s)   (Time) Comments   3/20/20  22.2  32  1.6  67   Vancomycin 2000 mg IV x 1 dose   2130     3/21/20 21.7 27 1.6 67 Vancomycin 1250 mg IV q 12 hr 1123  2123 Vanco trough obtained at 0930 was 12.5 mcg/mL    3/22/20 27.2 25 1.9 57 Vancomycin 1250 mg IV q 12 hr 1152  2127     3/23/20 18.4 25 1.9 57 Vancomycin 1750 mg IV q 24 hr 2030 Vanco trough 24.3 mcg/mL at 0820    3/24/20 14.7 26 1.7 63 Vancomycin 1750 mg IV q 24 hr 2109     3/25/20 13.2 24 1.7 70 Vancomycin 1750 mg IV q 24 hr  Vanco trough @2030        Intake/Output Summary (Last 24 hours) at 3/25/2020 0706  Last data filed at 3/25/2020 1533  Gross per 24 hour   Intake 4971 ml   Output 1675 ml   Net 3296 ml     Urine output over the last 24 hours: incomplete documentation    Diuretics ordered in the last 24 hours: Spironolactone 50 mg PO qAM    Cultures:  available culture and sensitivity results were reviewed in Epic  3/20 Urine cx - <10,000 col gram neg rods; <10,000 col Enterococcus sp  3/20 Blood cx's - Prelim no growth  3/20 Surgical cx - Light growth Staph hominis (Vanco <=0.5S); Light growth Staph epidermidis #1 (Vanco 1S);          Light growth Staph epidermidis #2 (Vanco 1S)    3/20 CTA ABDOMINAL AORTA W BILAT RUNOFF W CONTRAST - thrombosis of the common femoral artery with gangrene in the upper thigh musculature    IV lines:  3/20 Peripheral IV 20 g R wrist     Assessment:  · 60 y.o. male admitted 3/20 with critical ischemia of right lower extremity s/p AKA in 2018. Pt went to the OR with Vascular Surgery 3/20 s/p R groin exploration with iliofemoral artery and deep femoral artery embolectomy. Additionally, pt complaining of increased warmth and pain of his R scrotum s/p incision and drainage of R scrotum with general surgery  · Empiric antibiotics initiated - Vancomycin day #6; Cefepime discontinued  · ID following  · Vanco trough 24.3 mcg/mL on 3/23; Goal trough level = 15-20 mcg/mL  · SCr 1.7 today again today    Plan:  · Continue Vancomycin 1750 mg IV q24h   · Will order an additional Vanco trough level with the 21:00 dose tonight and will adjust dose as needed  · Will monitor renal function closely    Will continue to follow.      Dipak Castillo, PharmD, BCPS, BCCCP  3/25/2020  8:55 AM  Pager: [phone]

## 2020-03-25 NOTE — PROGRESS NOTES
Physical Therapy  Physical Therapy Initial Assessment     Name: Joshua Starr  : 1955  MRN: [id number]    Referring Provider:  LORRAINE Quezada    Date of Service: 3/25/2020    Evaluating PT:  Yan Rizzo, PT, DPT [id number]    Room #:  8053/2777-M  Diagnosis:  Occlusion of common femoral artery  Precautions: Falls, R AKA, O2  Procedure/Surgery:  3/21 Right groin exploration, Right iliofemoral artery embolectomy with primary closure of arteriotomy, Right deep femoral artery embolectomy, Right lower extremity diagnostic incision evaluation, Right scrotal incision and drainage  PMHx/PSHx:  DM, HLD, HTN, Legionnaire's disease, PVD  Equipment Needs:  TBD    SUBJECTIVE:    Pt lives alone in a 1st floor apartment with 1 stairs to enter and no rail. [place] [id number] aide for homemaking and transportation 2hrs/day. Pt ambulated with crutches or WW for short distance and WC for primary means of transportation PTA. OBJECTIVE:   Initial Evaluation  Date: 3/23/20 Treatment  Date: 3/25/2020 Short Term/ Long Term   Goals   AM-PAC 6 Clicks  67/54    Was pt agreeable to Eval/treatment? Yes yes    Does pt have pain?  10/10 incisional pain 7/10 incisional pain    Bed Mobility  Rolling: SBA  Supine to sit: Mila  Sit to supine: Mila  Scooting: Mila Rolling: SBA  Supine to sit: SBA  Sit to supine: SBA  Scooting: SBA Mod Independent   Transfers Sit to stand: NT  Stand to sit: NT  Stand pivot: NT Sit to stand: modA  Stand to sit: modA  Stand pivot: NT Mod Independent with AAD   Ambulation   NT NT d/t prosthetic limb not present >15 feet with Mod Independent with AAD   Wheelchair mobility NT NT >100 feet Mod Independent on level surfaces   Stair negotiation: ascended and descended NT NT 1 curb step with crutches Mod Independent   ROM BUE:  Defer to OT note  BLE:  WNL     Strength BUE:  Defer to OT note  LLE:  4/5  Increase by 1/3 MMT grade   Balance Sitting EOB:  Mila dynamic  Dynamic Standing:  NT Sitting EOB: SBA  Dynamic Treatment Time  24 minutes     CPT codes:  [] Gait training 45928 0 minutes  [] Manual therapy 28492 0 minutes  [] Therapeutic activities 46157 24 minutes  [] Therapeutic exercises 62381 0 minutes  [] Neuromuscular reeducation 29456 0 minutes    Ray Bautista PT, DPT  [id number]

## 2020-03-25 NOTE — PROGRESS NOTES
Patient had 14 beats of Vtach. Patient is asymptomatic VS stable. Dr. Karen Pacheco notified since cardiology signed off. Dr. Lea Meraz notified.

## 2020-03-25 NOTE — PLAN OF CARE
Problem: Pain:  Goal: Pain level will decrease  Description: Pain level will decrease  3/24/2020 2303 by Dallin Restrepo RN  Outcome: Met This Shift     Problem: Falls - Risk of:  Goal: Will remain free from falls  Description: Will remain free from falls  3/24/2020 2303 by Dallin Restrepo RN  Outcome: Met This Shift     Problem: Venous Thromboembolism - Risk of:  Goal: Will show no signs or symptoms of venous thromboembolism  Description: Will show no signs or symptoms of venous thromboembolism  3/24/2020 2303 by Dallin Restrepo RN  Outcome: Met This Shift

## 2020-03-25 NOTE — ANESTHESIA POSTPROCEDURE EVALUATION
Department of Anesthesiology  Postprocedure Note    Patient: Renato Sprague  MRN: [id number]  YOB: 1955  Date of evaluation: 3/25/2020  Time:  9:43 AM     Procedure Summary     Date:  03/24/20 Room / Location:  [facility]    Anesthesia Start:  0910 Anesthesia Stop:  2742    Procedure:  JUSTO (BEDSIDE) Diagnosis:      Scheduled Providers:   Responsible Provider:  Nita Tran MD    Anesthesia Type:  MAC ASA Status:  3          Anesthesia Type: No value filed. Blaine Phase I: Blaine Score: 8    Blaine Phase II: Blaine Score: 9    Last vitals: Reviewed and per EMR flowsheets.        Anesthesia Post Evaluation    Patient location during evaluation: floor  Patient participation: complete - patient participated  Level of consciousness: awake and alert  Airway patency: patent  Nausea & Vomiting: no nausea and no vomiting  Complications: no  Cardiovascular status: hemodynamically stable and blood pressure returned to baseline  Respiratory status: acceptable  Hydration status: euvolemic

## 2020-03-25 NOTE — PROGRESS NOTES
Subjective:  Feeling better   No CP or SOB  No fever or chills   No uncontrolled pain  No vomiting or diarrhea     Objective:    BP (!) 106/47   Pulse 67   Temp 98 °F (36.7 °C) (Temporal)   Resp 16   Ht 6' 2\" (1.88 m)   Wt (!) 336 lb (152.4 kg)   SpO2 98%   BMI 43.14 kg/m²     24HR INTAKE/OUTPUT:      Intake/Output Summary (Last 24 hours) at 3/25/2020 0852  Last data filed at 3/25/2020 5916  Gross per 24 hour   Intake 4911 ml   Output 1175 ml   Net 3736 ml     nad  Heart:  RRR, no murmurs, gallops, or rubs.   Lungs:  CTA bilaterally, no wheeze, rales or rhonchi  Abd: bowel sounds present, nontender, nondistended, no masses  Extrem:  No clubbing, cyanosis, or edema  Wound dressed  Most Recent Labs  Lab Results   Component Value Date    WBC 13.2 (H) 03/25/2020    HGB 10.4 (L) 03/25/2020    HCT 32.8 (L) 03/25/2020     03/25/2020     03/25/2020     03/25/2020    K 4.1 03/25/2020    K 4.1 03/25/2020     03/25/2020     03/25/2020    CREATININE 1.7 (H) 03/25/2020    CREATININE 1.7 (H) 03/25/2020    BUN 24 (H) 03/25/2020    BUN 24 (H) 03/25/2020    CO2 21 (L) 03/25/2020    CO2 21 (L) 03/25/2020    GLUCOSE 244 (H) 03/25/2020    GLUCOSE 245 (H) 03/25/2020    ALT 39 03/25/2020    AST 80 (H) 03/25/2020    INR 1.3 03/20/2020    TSH 2.270 03/23/2020    LABA1C 11.3 (H) 03/21/2020    LABMICR 71.6 (H) 05/23/2018     Recent Labs     03/25/20  0402   MG 1.9     Lab Results   Component Value Date    CALCIUM 8.4 (L) 03/25/2020    CALCIUM 8.3 (L) 03/25/2020    PHOS 1.9 (L) 03/25/2020        US DUP UPPER EXTREMITY LEFT VENOUS   Final Result   No evidence for deep venous thrombosis               CTA ABDOMINAL AORTA W BILAT RUNOFF W CONTRAST   Final Result   There is complete thrombotic occlusion of the origin of the right   common femoral artery with some gas in the proximal superficial   femoral artery in the upper thigh, with gas extending into the   quadriceps muscles of the upper thigh amputation closure of arteriotomy, Right deep femoral artery embolectomy, Right lower extremity diagnostic incision and drainage 3/20  Transferred to intermediate care from CVICU  IV antibiotics per ID vancomycin  Surgical culture positive for mixed gram-positive organisms-- CNS species, corynebact  ID Consult appreciated   Cardiology consult appreciated continue current medications, continue AC, recommend event monitor versus ILR in 6 months prior to discontinuing anticoagulation  Increase Lantus  PMR consult possible ARU versus SNF      Electronically signed by Irena Thapa MD on 3/25/2020 at 8:52 AM

## 2020-03-25 NOTE — PROGRESS NOTES
OT BEDSIDE TREATMENT NOTE      Date:3/25/2020  Patient Name: Kiera Phillip  MRN: [id number]  : 1955  Room: [address] [place] [address]A     Referring Provider: LORRAINE Pandya     Evaluating OT: Nabil Rosa OTR/L [id number]     AM-PAC Daily Activity Raw Score:      Recommended Adaptive Equipment: TBA: LB dressing AE; drop arm 3in1 commode as needed         Diagnosis:  Occlusion of common femoral artery     Reason for admission: Presented to ED with R LE hip, R testicular pain.     Surgery: 3/21 Right groin exploration, Right iliofemoral artery embolectomy with primary closure of arteriotomy, Right deep femoral artery embolectomy, Right lower extremity diagnostic incision evaluation, Right scrotal incision and drainage      Pertinent Medical History: R AKA with prosthesis, DM, HLD, HTN, Legionnaire's disease, PVD     Precautions:  Falls, R AKA, O2, arterial line     Home Living: Pt lives alone  in a first floor apt with 1step(s) to enter and no rail(s); bed/bath on first floor  Bathroom setup: tub/shower with bench; standard height commode/no rails  Equipment owned: bed rails, tub transfer bench, [employer], crutches, WC     Prior Level of Function: Mod I with ADLs; Assist with IADLs. Shiloh Malhotra aide 2 hrs/day to assist with housekeeping and transportation. Pt reports using crutches for stair negotiation, WW for bathroom mobility and WC for mobility in apt. Driving: no  Occupation: disability      Per OT Eval:        Pain Level: pt c/o 10/10 incisional pain; back pain due to \"pinched nerve\"  this session        Cognition: A&O: 4/4    Follows 1-2 step commands with increased time.               Memory: F              Comprehension F              Problem solving: F              Judgement/safety: Fair-              Functional Assessment    Initial Eval Status  Date: 3/23 Treatment Status  Date: 3/25/20 STG=LTG  5-14  days    Feeding S; set up n/t                        Mod I  while seated up in chair to increase activity tolerance

## 2020-03-25 NOTE — PROGRESS NOTES
hydroxide, promethazine **OR** ondansetron, oxyCODONE-acetaminophen **OR** oxyCODONE-acetaminophen, labetalol, hydrALAZINE, glucose, dextrose, glucagon (rDNA), dextrose, sodium chloride flush    OBJECTIVE:  BP (!) 143/67   Pulse 72   Temp 98.7 °F (37.1 °C) (Temporal)   Resp 16   Ht 6' 2\" (1.88 m)   Wt (!) 336 lb (152.4 kg)   SpO2 94%   BMI 43.14 kg/m²   Temp  Av.2 °F (36.8 °C)  Min: 97.8 °F (36.6 °C)  Max: 98.7 °F (37.1 °C)  Constitutional: The patient is awake, alert, and oriented. Skin: Warm and dry. No rashes were noted. HEENT: Round and reactive pupils. Moist mucous membranes. No ulcerations or thrush. Neck: Supple to movements. Chest: No use of accessory muscles to breathe. Symmetrical expansion. No wheezing, crackles or rhonchi. Cardiovascular: S1 and S2 are rhythmic and regular. No murmurs appreciated. Abdomen: Positive bowel sounds to auscultation. Benign to palpation. No masses felt. No hepatosplenomegaly. Genitourinary:   Male; less tenderness to the right scrotum Penrose drain  Extremities: No clubbing, no cyanosis, less edema. The AKA appears to be less edematous and is perfusing well.   Lines: peripheral  Art line 3/20/2020 right radial    Laboratory and Tests Review:  Lab Results   Component Value Date    WBC 13.2 (H) 2020    WBC 14.7 (H) 2020    WBC 18.4 (H) 2020    HGB 10.4 (L) 2020    HCT 32.8 (L) 2020    MCV 81.8 2020     2020     Lab Results   Component Value Date    NEUTROABS 19.26 (H) 2020    NEUTROABS 6.83 2018    NEUTROABS 6.34 2018     No results found for: [facility]  Lab Results   Component Value Date    ALT 39 2020    AST 80 (H) 2020    ALKPHOS 162 (H) 2020    BILITOT 0.4 2020     Lab Results   Component Value Date     2020     2020    K 4.1 2020    K 4.1 2020     2020     2020    CO2 21 2020    CO2 21 2020 BUN 24 03/25/2020    BUN 24 03/25/2020    CREATININE 1.7 03/25/2020    CREATININE 1.7 03/25/2020    CREATININE 1.7 03/24/2020    GFRAA 50 03/25/2020    GFRAA 50 03/25/2020    LABGLOM 50 03/25/2020    LABGLOM 50 03/25/2020    GLUCOSE 244 03/25/2020    GLUCOSE 245 03/25/2020    PROT 6.3 03/25/2020    LABALBU 2.1 03/25/2020    CALCIUM 8.4 03/25/2020    CALCIUM 8.3 03/25/2020    BILITOT 0.4 03/25/2020    ALKPHOS 162 03/25/2020    AST 80 03/25/2020    ALT 39 03/25/2020     Lab Results   Component Value Date    CRP 10.6 (H) 10/24/2018    CRP 2.1 (H) 06/25/2018    CRP 7.4 (H) 06/11/2018     Lab Results   Component Value Date    SEDRATE 128 (H) 10/24/2018    SEDRATE 77 (H) 06/25/2018    SEDRATE 138 (H) 06/11/2018     Radiology:      Microbiology:   Lab Results   Component Value Date    BC 24 Hours- no growth 03/20/2020    BC 5 Days- no growth 10/26/2018    BC 5 Days- no growth 10/25/2018    ORG Staphylococcus hominis ssp hominis 03/20/2020    ORG Staphylococcus epidermidis 03/20/2020    ORG Staphylococcus epidermidis 03/20/2020     Lab Results   Component Value Date    BLOODCULT2 24 Hours- no growth 03/20/2020    BLOODCULT2 5 Days- no growth 10/26/2018    BLOODCULT2 5 Days- no growth 10/25/2018    ORG Staphylococcus hominis ssp hominis 03/20/2020    ORG Staphylococcus epidermidis 03/20/2020    ORG Staphylococcus epidermidis 03/20/2020     WOUND/ABSCESS   Date Value Ref Range Status   05/22/2018 Physician requested workup (A)  Final   05/22/2018 Moderate growth  Final   05/22/2018 Heavy growth  Final   05/22/2018 Light growth  Final     No results found for: RESPSMEAR  No results found for: MPNEUMO, CLAMYDCU, LABLEGI, AFBCX, FUNGSM, LABFUNG  No results found for: CULTRESP  No results found for: CXCATHTIP  No results found for: BFCS  Culture Surgical   Date Value Ref Range Status   03/20/2020 (A)  Final    Growth present, evaluating for:  Mixed Gram positive organisms  Mixed radha isolated.  Further workup and sensitivity testing  is not routinely indicated and will not be performed. Mixed radha isolated includes:  Mixed Coagulase negative Staph species  Mixed morphologies Corynebacteria  Physician requested workup     03/20/2020   Preliminary    Light growth  Identification and sensitivity to follow     03/20/2020 Light growth  Final   03/20/2020   Final    Light growth  Identification and sensitivity to follow  Second morphology       Urine Culture, Routine   Date Value Ref Range Status   03/20/2020 (A)  Final    <10,000 CFU/mL  Gram negative rods  <10,000 CFU/mL  Gram negative rods  Culture in progress     03/20/2020   Preliminary    <10,000 CFU/ml  Identification and sensitivity to follow     03/20/2020   Preliminary    <10,000 CFU/ml  Identification and sensitivity to follow       No results found for: [address]     ASSESSMENT:  · Status post embolectomy right AKA vascular supply including embolectomy of the right common femoral as well as superficial femoral  · Exploration to the muscle  · Skin and soft tissue polymicrobial skin type of radha  · Leukocytosis slowly resolving    PLAN:  · Discontinue IV vancomycin ; Start PO Linezolid  · Can be discharge on PO Linezolid x 14 days (med reconciled)  · Reviewed final cultures   · Monitor labs    Regina Ronaldo  11:13 AM  3/25/2020     Pt seen and examined. Above discussed agree with advanced practice nurse. Labs, cultures, and radiographs reviewed. Face to Face encounter occurred. Changes made as necessary.      Latia Moser MD

## 2020-03-26 LAB
ALBUMIN SERPL-MCNC: 1.9 G/DL (ref 3.5–5.2)
ALP BLD-CCNC: 139 U/L (ref 40–129)
ALT SERPL-CCNC: 37 U/L (ref 0–40)
ANION GAP SERPL CALCULATED.3IONS-SCNC: 10 MMOL/L (ref 7–16)
AST SERPL-CCNC: 53 U/L (ref 0–39)
BILIRUB SERPL-MCNC: 0.3 MG/DL (ref 0–1.2)
BUN BLDV-MCNC: 19 MG/DL (ref 8–23)
CALCIUM IONIZED: 1.26 MMOL/L (ref 1.15–1.33)
CALCIUM SERPL-MCNC: 8.5 MG/DL (ref 8.6–10.2)
CHLORIDE BLD-SCNC: 104 MMOL/L (ref 98–107)
CO2: 20 MMOL/L (ref 22–29)
CREAT SERPL-MCNC: 1.6 MG/DL (ref 0.7–1.2)
CULTURE SURGICAL: ABNORMAL
GFR AFRICAN AMERICAN: 53
GFR NON-AFRICAN AMERICAN: 53 ML/MIN/1.73
GLUCOSE BLD-MCNC: 138 MG/DL (ref 74–99)
HCT VFR BLD CALC: 30.3 % (ref 37–54)
HEMOGLOBIN: 9.3 G/DL (ref 12.5–16.5)
MAGNESIUM: 2.2 MG/DL (ref 1.6–2.6)
MCH RBC QN AUTO: 25.5 PG (ref 26–35)
MCHC RBC AUTO-ENTMCNC: 30.7 % (ref 32–34.5)
MCV RBC AUTO: 83.2 FL (ref 80–99.9)
METER GLUCOSE: 122 MG/DL (ref 74–99)
METER GLUCOSE: 145 MG/DL (ref 74–99)
METER GLUCOSE: 150 MG/DL (ref 74–99)
METER GLUCOSE: 182 MG/DL (ref 74–99)
ORGANISM: ABNORMAL
PDW BLD-RTO: 14.5 FL (ref 11.5–15)
PHOSPHORUS: 2.6 MG/DL (ref 2.5–4.5)
PLATELET # BLD: 176 E9/L (ref 130–450)
PMV BLD AUTO: 13.5 FL (ref 7–12)
POTASSIUM SERPL-SCNC: 4 MMOL/L (ref 3.5–5)
RBC # BLD: 3.64 E12/L (ref 3.8–5.8)
SODIUM BLD-SCNC: 134 MMOL/L (ref 132–146)
TOTAL PROTEIN: 6.2 G/DL (ref 6.4–8.3)
URINE CULTURE, ROUTINE: ABNORMAL
WBC # BLD: 13.5 E9/L (ref 4.5–11.5)

## 2020-03-26 PROCEDURE — 82330 ASSAY OF CALCIUM: CPT

## 2020-03-26 PROCEDURE — 6370000000 HC RX 637 (ALT 250 FOR IP): Performed by: STUDENT IN AN ORGANIZED HEALTH CARE EDUCATION/TRAINING PROGRAM

## 2020-03-26 PROCEDURE — 36415 COLL VENOUS BLD VENIPUNCTURE: CPT

## 2020-03-26 PROCEDURE — 83735 ASSAY OF MAGNESIUM: CPT

## 2020-03-26 PROCEDURE — 6370000000 HC RX 637 (ALT 250 FOR IP): Performed by: NURSE PRACTITIONER

## 2020-03-26 PROCEDURE — 80053 COMPREHEN METABOLIC PANEL: CPT

## 2020-03-26 PROCEDURE — 2140000000 HC CCU INTERMEDIATE R&B

## 2020-03-26 PROCEDURE — 2580000003 HC RX 258: Performed by: SURGERY

## 2020-03-26 PROCEDURE — 85027 COMPLETE CBC AUTOMATED: CPT

## 2020-03-26 PROCEDURE — 82962 GLUCOSE BLOOD TEST: CPT

## 2020-03-26 PROCEDURE — 6370000000 HC RX 637 (ALT 250 FOR IP): Performed by: INTERNAL MEDICINE

## 2020-03-26 PROCEDURE — 6370000000 HC RX 637 (ALT 250 FOR IP): Performed by: SURGERY

## 2020-03-26 PROCEDURE — 2580000003 HC RX 258: Performed by: PHYSICIAN ASSISTANT

## 2020-03-26 PROCEDURE — 94660 CPAP INITIATION&MGMT: CPT

## 2020-03-26 PROCEDURE — 84100 ASSAY OF PHOSPHORUS: CPT

## 2020-03-26 PROCEDURE — 6370000000 HC RX 637 (ALT 250 FOR IP): Performed by: PHYSICIAN ASSISTANT

## 2020-03-26 RX ORDER — SENNOSIDES 8.6 MG
1 TABLET ORAL DAILY
Qty: 120 TABLET | Refills: 0 | Status: ON HOLD | OUTPATIENT
Start: 2020-03-26 | End: 2020-06-23 | Stop reason: HOSPADM

## 2020-03-26 RX ORDER — TAMSULOSIN HYDROCHLORIDE 0.4 MG/1
0.4 CAPSULE ORAL DAILY
Qty: 30 CAPSULE | Refills: 3 | Status: ON HOLD | DISCHARGE
Start: 2020-03-27 | End: 2020-06-23 | Stop reason: HOSPADM

## 2020-03-26 RX ORDER — DIAPER,BRIEF,INFANT-TODD,DISP
EACH MISCELLANEOUS 3 TIMES DAILY
Status: DISCONTINUED | OUTPATIENT
Start: 2020-03-26 | End: 2020-03-27 | Stop reason: HOSPADM

## 2020-03-26 RX ORDER — DOCUSATE SODIUM 100 MG/1
100 CAPSULE, LIQUID FILLED ORAL 2 TIMES DAILY
Qty: 50 CAPSULE | Refills: 0 | Status: SHIPPED | OUTPATIENT
Start: 2020-03-26 | End: 2021-05-12

## 2020-03-26 RX ORDER — DIAPER,BRIEF,INFANT-TODD,DISP
EACH MISCELLANEOUS
Qty: 30 G | Refills: 1 | DISCHARGE
Start: 2020-03-26 | End: 2020-04-05

## 2020-03-26 RX ORDER — OXYCODONE HYDROCHLORIDE AND ACETAMINOPHEN 5; 325 MG/1; MG/1
1 TABLET ORAL EVERY 6 HOURS PRN
Qty: 28 TABLET | Refills: 0 | Status: SHIPPED | OUTPATIENT
Start: 2020-03-26 | End: 2020-04-02

## 2020-03-26 RX ADMIN — Medication 10 ML: at 22:18

## 2020-03-26 RX ADMIN — APIXABAN 5 MG: 5 TABLET, FILM COATED ORAL at 08:55

## 2020-03-26 RX ADMIN — BACITRACIN ZINC: 500 OINTMENT TOPICAL at 22:15

## 2020-03-26 RX ADMIN — CLONIDINE HYDROCHLORIDE 0.1 MG: 0.1 TABLET ORAL at 14:48

## 2020-03-26 RX ADMIN — LUBIPROSTONE 24 MCG: 24 CAPSULE, GELATIN COATED ORAL at 08:53

## 2020-03-26 RX ADMIN — DULOXETINE HYDROCHLORIDE 120 MG: 60 CAPSULE, DELAYED RELEASE ORAL at 08:53

## 2020-03-26 RX ADMIN — PREGABALIN 200 MG: 100 CAPSULE ORAL at 14:52

## 2020-03-26 RX ADMIN — BISACODYL 10 MG: 5 TABLET, COATED ORAL at 09:00

## 2020-03-26 RX ADMIN — CLONIDINE HYDROCHLORIDE 0.1 MG: 0.1 TABLET ORAL at 08:53

## 2020-03-26 RX ADMIN — LUBIPROSTONE 24 MCG: 24 CAPSULE, GELATIN COATED ORAL at 17:44

## 2020-03-26 RX ADMIN — INSULIN GLARGINE 30 UNITS: 100 INJECTION, SOLUTION SUBCUTANEOUS at 09:00

## 2020-03-26 RX ADMIN — INSULIN LISPRO 3 UNITS: 100 INJECTION, SOLUTION INTRAVENOUS; SUBCUTANEOUS at 17:12

## 2020-03-26 RX ADMIN — MICONAZOLE NITRATE 13 G: 20.6 POWDER TOPICAL at 08:51

## 2020-03-26 RX ADMIN — BACITRACIN ZINC: 500 OINTMENT TOPICAL at 14:52

## 2020-03-26 RX ADMIN — APIXABAN 5 MG: 5 TABLET, FILM COATED ORAL at 22:12

## 2020-03-26 RX ADMIN — OXYCODONE AND ACETAMINOPHEN 2 TABLET: 5; 325 TABLET ORAL at 22:11

## 2020-03-26 RX ADMIN — OXYCODONE AND ACETAMINOPHEN 2 TABLET: 5; 325 TABLET ORAL at 13:12

## 2020-03-26 RX ADMIN — PREGABALIN 200 MG: 100 CAPSULE ORAL at 14:48

## 2020-03-26 RX ADMIN — DOCUSATE SODIUM 100 MG: 100 CAPSULE, LIQUID FILLED ORAL at 22:11

## 2020-03-26 RX ADMIN — AMLODIPINE BESYLATE 10 MG: 10 TABLET ORAL at 08:54

## 2020-03-26 RX ADMIN — PANTOPRAZOLE SODIUM 40 MG: 40 TABLET, DELAYED RELEASE ORAL at 06:22

## 2020-03-26 RX ADMIN — DOCUSATE SODIUM 100 MG: 100 CAPSULE, LIQUID FILLED ORAL at 08:55

## 2020-03-26 RX ADMIN — CLONIDINE HYDROCHLORIDE 0.1 MG: 0.1 TABLET ORAL at 14:52

## 2020-03-26 RX ADMIN — CILOSTAZOL 100 MG: 100 TABLET ORAL at 22:11

## 2020-03-26 RX ADMIN — LINEZOLID 600 MG: 600 TABLET, FILM COATED ORAL at 22:12

## 2020-03-26 RX ADMIN — LISINOPRIL 10 MG: 10 TABLET ORAL at 08:54

## 2020-03-26 RX ADMIN — PREGABALIN 200 MG: 100 CAPSULE ORAL at 08:56

## 2020-03-26 RX ADMIN — OXYCODONE AND ACETAMINOPHEN 2 TABLET: 5; 325 TABLET ORAL at 06:21

## 2020-03-26 RX ADMIN — CLONIDINE HYDROCHLORIDE 0.1 MG: 0.1 TABLET ORAL at 22:12

## 2020-03-26 RX ADMIN — METOPROLOL TARTRATE 100 MG: 50 TABLET, FILM COATED ORAL at 22:11

## 2020-03-26 RX ADMIN — SPIRONOLACTONE 50 MG: 25 TABLET ORAL at 08:52

## 2020-03-26 RX ADMIN — INSULIN LISPRO 2 UNITS: 100 INJECTION, SOLUTION INTRAVENOUS; SUBCUTANEOUS at 22:14

## 2020-03-26 RX ADMIN — INSULIN GLARGINE 30 UNITS: 100 INJECTION, SOLUTION SUBCUTANEOUS at 22:14

## 2020-03-26 RX ADMIN — CILOSTAZOL 100 MG: 100 TABLET ORAL at 08:53

## 2020-03-26 RX ADMIN — PREGABALIN 200 MG: 100 CAPSULE ORAL at 22:12

## 2020-03-26 RX ADMIN — TAMSULOSIN HYDROCHLORIDE 0.4 MG: 0.4 CAPSULE ORAL at 08:55

## 2020-03-26 RX ADMIN — ASPIRIN 81 MG CHEWABLE TABLET 81 MG: 81 TABLET CHEWABLE at 08:55

## 2020-03-26 RX ADMIN — INSULIN LISPRO 3 UNITS: 100 INJECTION, SOLUTION INTRAVENOUS; SUBCUTANEOUS at 13:11

## 2020-03-26 RX ADMIN — Medication 10 ML: at 08:52

## 2020-03-26 RX ADMIN — LINEZOLID 600 MG: 600 TABLET, FILM COATED ORAL at 09:15

## 2020-03-26 RX ADMIN — MICONAZOLE NITRATE 13 G: 20.6 POWDER TOPICAL at 22:14

## 2020-03-26 RX ADMIN — SODIUM CHLORIDE: 9 INJECTION, SOLUTION INTRAVENOUS at 03:38

## 2020-03-26 RX ADMIN — METOPROLOL TARTRATE 100 MG: 50 TABLET, FILM COATED ORAL at 08:56

## 2020-03-26 RX ADMIN — ATORVASTATIN CALCIUM 10 MG: 10 TABLET, FILM COATED ORAL at 08:54

## 2020-03-26 RX ADMIN — BACITRACIN ZINC: 500 OINTMENT TOPICAL at 08:51

## 2020-03-26 ASSESSMENT — PAIN DESCRIPTION - DESCRIPTORS
DESCRIPTORS: CONSTANT;DISCOMFORT
DESCRIPTORS: ACHING;DISCOMFORT;SORE
DESCRIPTORS: ACHING;DISCOMFORT;SORE

## 2020-03-26 ASSESSMENT — PAIN SCALES - GENERAL
PAINLEVEL_OUTOF10: 7
PAINLEVEL_OUTOF10: 8
PAINLEVEL_OUTOF10: 2
PAINLEVEL_OUTOF10: 6
PAINLEVEL_OUTOF10: 2
PAINLEVEL_OUTOF10: 10
PAINLEVEL_OUTOF10: 4
PAINLEVEL_OUTOF10: 3

## 2020-03-26 ASSESSMENT — PAIN DESCRIPTION - LOCATION
LOCATION: GROIN

## 2020-03-26 ASSESSMENT — PAIN - FUNCTIONAL ASSESSMENT
PAIN_FUNCTIONAL_ASSESSMENT: ACTIVITIES ARE NOT PREVENTED
PAIN_FUNCTIONAL_ASSESSMENT: ACTIVITIES ARE NOT PREVENTED

## 2020-03-26 ASSESSMENT — PAIN DESCRIPTION - ORIENTATION
ORIENTATION: RIGHT

## 2020-03-26 ASSESSMENT — PAIN DESCRIPTION - PROGRESSION: CLINICAL_PROGRESSION: GRADUALLY WORSENING

## 2020-03-26 ASSESSMENT — PAIN DESCRIPTION - PAIN TYPE
TYPE: SURGICAL PAIN

## 2020-03-26 ASSESSMENT — PAIN DESCRIPTION - ONSET: ONSET: GRADUAL

## 2020-03-26 ASSESSMENT — PAIN DESCRIPTION - FREQUENCY: FREQUENCY: INTERMITTENT

## 2020-03-26 NOTE — DISCHARGE INSTR - COC
Continuity of Care Form    Patient Name: Hosie Lundborg   :  1955  MRN:  [id number]    Admit date:  3/20/2020  Discharge date:  3/27/2020    Code Status Order: Full Code   Advance Directives:   [facility] Documentation     Date/Time Healthcare Directive Type of Healthcare Directive Copy in [facility] [address] Agent's Name Healthcare Agent's Phone Number    20  Unknown, patient unable to respond due to medical condition -- -- -- -- --          Admitting Physician:  Izabella Flores MD  PCP: Robinson Menon DO    Discharging Nurse: [facility] Unit/Room#: 7737/0664-V  Discharging Unit Phone Number: [phone]    Emergency Contact:   Extended Emergency Contact Information  Primary Emergency Contact: [facility] Phone: [phone]  Relation: Brother/Sister  Secondary Emergency Contact: Urmila Care  [employer] Phone: [phone]  Relation: Child    Past Surgical History:  Past Surgical History:   Procedure Laterality Date    FEMORAL BYPASS      Right [address] Right 3/20/2020    RIGHT LOWER EXTREMITY THROMBECTOMY, POSSIBLE ANGIOGRAM, POSSIBLE INTERVENTION, POSSIBLE BYPASS.  performed by Dalia Bryant MD at [facility] Right 2018    AMPUTATION ABOVE KNEE RIGHT LEG performed by Dalia Bryant MD at [facility] Right 2018    RIGHT FOOT INCISION AND DRAINAGE WITH PARTIAL BONE RESECTION performed by Don Marcelino DPM at [address] [place] [address] OFFICE/OUTPT VISIT,PROCEDURE ONLY Right 8/3/2018    INCISION AND DRAINAGE MULTIPLE AREAS RIGHT FOOT WITH DEBRIDEMENT SOFT TISSUE performed by Don Marcelino DPM at [place] [address] Right     leg        Immunization History:   Immunization History   Administered Date(s) Administered    Influenza A (C4Q1-18) Vaccine PF IM 12/15/2009    Influenza, Quadv, IM, PF (6 mo and older of days: 510        Elimination:  Continence:   · Bowel: Yes  · Bladder: Yes  Urinary Catheter: None   Colostomy/Ileostomy/Ileal Conduit: No       Date of Last BM: 3/27/2020    Intake/Output Summary (Last 24 hours) at 3/26/2020 1459  Last data filed at 3/26/2020 1446  Gross per 24 hour   Intake 3674.08 ml   Output 2675 ml   Net 999.08 ml     I/O last 3 completed shifts: In: 3674.1 [P.O.:660; I.V.:2493.5; IV Piggyback:520.6]  Out: 4964 [Urine:1775]    Safety Concerns: At Risk for Falls    Impairments/Disabilities:      Amputation - R above the knee    Nutrition Therapy:  Current Nutrition Therapy:   - Oral Diet:  Carb Control 4 carbs/meal (1800kcals/day)    Routes of Feeding: Oral  Liquids: Thin Liquids  Daily Fluid Restriction: no  Last Modified Barium Swallow with Video (Video Swallowing Test): not done    Treatments at the Time of Hospital Discharge:   Respiratory Treatments: ***  Oxygen Therapy:  is not on home oxygen therapy.   Ventilator:    - BiPAP   IPAP: 16 cmH20, CPAP/EPAP: 8 cmH2O only when sleeping    Rehab Therapies: PT and OT to eval and treat   Weight Bearing Status/Restrictions: No weight bearing restirctions  Other Medical Equipment (for information only, NOT a DME order):  Prothesis for R AKA   Other Treatments: ***    Patient's personal belongings (please select all that are sent with patient):  clothing     RN SIGNATURE:  Electronically signed by Carlos Melara RN on 3/27/20 at 2:24 PM EDT    CASE MANAGEMENT/SOCIAL WORK SECTION    Inpatient Status Date: ***    Readmission Risk Assessment Score:  Readmission Risk              Risk of Unplanned Readmission:        27           Discharging to Facility/ Agency   · Name: [facility]   · Address:  · Phone:  · Fax:    Dialysis Facility (if applicable)   · Name:  · Address:  · Dialysis Schedule:  · Phone:  · Fax:    / signature: Electronically signed by ARMEN Krishnamurthy on 3/27/2020 at 12:55 PM      PHYSICIAN

## 2020-03-26 NOTE — PROGRESS NOTES
are noted in the left leg, which has   arterial patency to the ankle, primarily in the posterior tibial and   peroneal distributions. ALERT:  THIS IS AN ABNORMAL REPORT-thrombosis of the common femoral   artery with gangrene in the upper thigh musculature. Note: Emergency department physician was contacted telephonically at   the time of this dictation to communicate findings. XR FEMUR RIGHT (MIN 2 VIEWS)   Final Result   1. Normal chest with improved aeration of the lungs. 2. Some gas is identified in the ventral 5 soft tissues along the   proximal femur, suggesting gas in venous structures and along fascial   planes. ALERT:  THIS IS AN ABNORMAL REPORT      XR CHEST PORTABLE   Final Result   1. Normal chest with improved aeration of the lungs. 2. Some gas is identified in the ventral 5 soft tissues along the   proximal femur, suggesting gas in venous structures and along fascial   planes. ALERT:  THIS IS AN ABNORMAL REPORT          Assessment    Principal Problem:    Critical lower limb ischemia  Active Problems:    DM II (diabetes mellitus, type II), controlled (HCC)    HTN (hypertension)    Leukocytosis    Stage 3 chronic kidney disease (HCC)    HLD (hyperlipidemia)    Occlusion of common femoral artery (HCC)    Cellulitis, scrotum    Hyperglycemia due to type 2 diabetes mellitus (HCC)    Gas gangrene of thigh ([facility] [address]    Vascular occlusion  Resolved Problems:    * No resolved hospital problems.  *      Plan:  44-year-old male history of diabetes, hypertension, PAD admitted to CVICU with right lower extremity critical ischemia and right scrotal abscess status post Right groin exploration, I&D of right scrotal abscess right iliofemoral artery embolectomy with primary closure of arteriotomy, Right deep femoral artery embolectomy, Right lower extremity diagnostic incision and drainage 3/20  Transferred to intermediate care from CVICU  IV antibiotics per ID vancomycin-- PO linezolid x 14 days on DC  Surgical culture positive for mixed gram-positive organisms-- CNS species, corynebact  ID Consult appreciated   Cardiology consult appreciated continue current medications, continue AC, recommend event monitor versus ILR in 6 months prior to discontinuing anticoagulation  Increase Lantus  PMR consult  SNF      Electronically signed by Mik Marion MD on 3/26/2020 at 10:09 AM

## 2020-03-26 NOTE — PROGRESS NOTES
[address] [place] [address] [facility]  JOEIDA  Progress Note      Chief Complaint   Patient presents with    Hip Pain     right hip and scrotal pain, denies injury     Hyperglycemia     \"HI\" per EMS       SUBJECTIVE:    Patient is seen and examined at bedside  Patient is awake and alert  Patient is eating lunch  Denies any fevers, chills, nausea, vomiting or diarrhea  Patient is tolerating medications. No reported adverse drug reactions. Has good appetite  Scrotum still with Penrose drain (drainage has reduced in amount per patient)      Review of systems:  As stated above in the chief complaint, otherwise negative.     Medications:  Scheduled Meds:   bacitracin zinc   Topical TID    docusate sodium  100 mg Oral BID    bisacodyl  10 mg Rectal Daily    insulin glargine  30 Units Subcutaneous BID    linezolid  600 mg Oral 2 times per day    tamsulosin  0.4 mg Oral Daily    miconazole  5 g/m2 Topical BID    insulin lispro  0-18 Units Subcutaneous TID WC    insulin lispro  0-9 Units Subcutaneous Nightly    lidocaine PF  5 mL Intradermal Once    apixaban  5 mg Oral BID    amLODIPine  10 mg Oral Daily    aspirin  81 mg Oral Daily    atorvastatin  10 mg Oral Daily    cilostazol  100 mg Oral BID    cloNIDine  0.1 mg Oral TID    DULoxetine  120 mg Oral QAM    lisinopril  10 mg Oral Daily    lubiprostone  24 mcg Oral BID WC    metoprolol  100 mg Oral BID    pregabalin  200 mg Oral TID    spironolactone  50 mg Oral QAM    sodium chloride flush  10 mL Intravenous 2 times per day    pantoprazole  40 mg Oral QAM AC     Continuous Infusions:   dextrose       PRN Meds:metoprolol succinate, sodium phosphate IVPB **OR** sodium phosphate IVPB, zolpidem, sodium chloride flush, heparin flush, sodium chloride flush, potassium chloride **OR** potassium alternative oral replacement **OR** potassium chloride, acetaminophen **OR** acetaminophen, magnesium hydroxide, promethazine **OR** ondansetron, growth  Second morphology    Final     Urine Culture, Routine   Date Value Ref Range Status   03/20/2020 <10,000 CFU/mL  Gram negative rods   (A)  Final   03/20/2020 <10,000 CFU/ml  Final   03/20/2020 <10,000 CFU/ml  Final     No results found for: [facility]    Blood culture--Negative x 5 days    ASSESSMENT:  · Status post embolectomy right AKA vascular supply including embolectomy of the right common femoral as well as superficial femoral  · Exploration to the muscle  · Skin and soft tissue polymicrobial skin type of radha  · Leukocytosis slowly resolving  · I&D of right scrotal abscess --penrose drain--cx growing staph hominis and staph epidermidis    PLAN:  · Discontinue IV vancomycin ; Continue PO Linezolid  · Can be discharge on PO Linezolid x 14 days (med reconciled)  · Reviewed final cultures   · Monitor labs  · Patient to follow with vascular  · [facility]  10:51 AM  3/26/2020   Pt seen and examined. Above discussed agree with advanced practice nurse. Labs, cultures, and radiographs reviewed. Face to Face encounter occurred. Changes made as necessary.      Latia Moser MD

## 2020-03-26 NOTE — CONSULTS
[address]                  [address] [place],  [place] [address]                                  CONSULTATION    PATIENT NAME: Rachell Krabbe                    :        1955  MED REC NO:   [id number]                            ROOM:       7534  ACCOUNT NO:   [de-identified]                           ADMIT DATE: 2020  PROVIDER:     Vadim Guillory MD    CONSULT DATE:  2020    REHABILITATION CONSULTATION    CHIEF COMPLAINT:  Right above-knee amputation. HISTORY OF PRESENT ILLNESS:  I have been seeing the patient in the past.  He had initially a right below-knee amputation that had to be revised to  right above-knee amputation. I last saw him in 2019. He had a right  above-knee amputation at that time and was on medication for pain at the  residual limb. I have not seen him since then. He has been seeing  another pain management physician. He developed ischemia and infection  at the right lower limb. He had been able to function with a prosthesis  2 to 4 hours a day, but was also using the wheelchair at times. He was  living alone. He was readmitted to [facility] because of ischemia  on 2020. He underwent further vascular surgery. He is on p.o.  linezolid. He had an embolectomy on the right side and still has a lot  of skin breakdown there. He has been evaluated by therapy and he is at  a moderate assist level to stand and transfer. He is not able to use  the prosthesis at this point. PAST MEDICAL HISTORY:  1. Type 2 diabetes mellitus. 2.  Hypertension. 3.  Chronic renal impairment. 4.  Right above-knee amputation. ALLERGIES:  None known. CURRENT MEDICATIONS:  Norvasc, Eliquis, aspirin, Lipitor, Pletal,  Catapres, Cymbalta, short and long-acting insulin, Zyvox, Prinivil,  Amitiza, Protonix, Lyrica, Aldactone, Flomax and Percocet as needed for  pain.     SOCIAL HISTORY:  He lives alone in a wheelchair accessible home. REVIEW OF SYSTEMS:  Negative for prior HEENT problems. Negative for  prior cardiac or pulmonary problems. Negative for prior GI problems.  is positive right now for some scrotal edema. EXTREMITIES:  Right  above-knee amputation. MENTAL STATUS:  Alert and oriented. Sensation  is diminished in the left lower. NEUROMUSCULAR:  4/5 strength  throughout. PROBLEM LIST:  1. Right above-knee amputation. 2.  Gangrene of the right lower extremity. 3.  Diabetic neuropathy. 4.  Morbid obesity. 5.  Type 2 diabetes mellitus. 6.  Hypertension. RECOMMENDATIONS:  The patient is not at a level where he can manage at  home. He is going to require further rehab. Unfortunately, I do not  think he would meet his insurances' criteria for returning to acute  rehab. At this point, I think our best option is going to be skilled  nursing facility for a while until he gets to the point that he can  transfer well enough to return home at a wheelchair level.         Ean Landaverde MD    D: 03/26/2020 11:14:05       T: 03/26/2020 11:22:51     [email or web address]  Job#: [id number]     Doc#: [id number]    CC:

## 2020-03-26 NOTE — PROGRESS NOTES
Messaged SROC regarding appearance of right lateral thigh sutured area as well as patient has 2 new open reddened sore like areas on R thigh below groin site and scant bleeding noted from right groin incision.   Doppler pulses (+) bilateral groins as well as L popliteal and L pedal, L post tibial.

## 2020-03-26 NOTE — DISCHARGE SUMMARY
oxyCODONE-acetaminophen (PERCOCET) 5-325 MG per tablet Take 1 tablet by mouth every 6 hours as needed for Pain for up to 7 days. Intended supply: 7 days. Take lowest dose possible to manage pain  Qty: 28 tablet, Refills: 0    Comments: Reduce doses taken as pain becomes manageable  Associated Diagnoses: History of embolectomy      apixaban (ELIQUIS) 5 MG TABS tablet Take 1 tablet by mouth 2 times daily  Qty: 180 tablet, Refills: 1      docusate sodium (COLACE) 100 MG capsule Take 1 capsule by mouth 2 times daily  Qty: 50 capsule, Refills: 0      senna (SENOKOT) 8.6 MG TABS tablet Take 1 tablet by mouth daily  Qty: 120 tablet, Refills: 0      miconazole (MICOTIN) 2 % powder Apply topically 2 times daily. Qty: 45 g, Refills: 1      bacitracin zinc 500 UNIT/GM ointment Apply topically 2 times daily.   Qty: 30 g, Refills: 1      tamsulosin (FLOMAX) 0.4 MG capsule Take 1 capsule by mouth daily  Qty: 30 capsule, Refills: 3      linezolid (ZYVOX) 600 MG tablet Take 1 tablet by mouth every 12 hours for 14 days  Qty: 28 tablet, Refills: 0         CONTINUE these medications which have NOT CHANGED    Details   ONE TOUCH ULTRA TEST strip USE TO TEST BLOOD SUGARS DAILY AS NEEDED  Qty: 100 each, Refills: 2      DULoxetine (CYMBALTA) 60 MG extended release capsule TAKE 2 CAPSULES BY MOUTH EVERY MORNING  Qty: 60 capsule, Refills: 3      cloNIDine (CATAPRES) 0.1 MG tablet TAKE 1 TABLET BY MOUTH 3 TIMES A DAY  Qty: 60 tablet, Refills: 3      amLODIPine (NORVASC) 10 MG tablet TAKE ONE TABLET BY MOUTH EVERY MORNING - HOLD IF SBP<140  Qty: 30 tablet, Refills: 3      LANTUS 100 UNIT/ML injection vial INJECT 15 UNITS INTO THE SKIN TWO TIMES A DAY  Qty: 30 mL, Refills: 3      lubiprostone (AMITIZA) 24 MCG capsule Take 1 capsule by mouth 2 times daily (with meals)  Qty: 60 capsule, Refills: 3    Associated Diagnoses: Therapeutic opioid-induced constipation (OIC)      metoprolol (LOPRESSOR) 100 MG tablet TAKE 1 TABLET BY MOUTH 2 TIMES A Activity: activity as tolerated  Diet: diabetic diet    Follow-up with 1 week PCP 2 weeks infectious disease 2 weeks vascular surgery    Note that over 30 minutes was spent in preparing discharge papers, discussing discharge with patient, staff, consultants, medication review, arranging follow up, etc.    Signed:  Erik Han MD  3/26/2020  3:01 PM

## 2020-03-26 NOTE — PROGRESS NOTES
Vascular Surgery Progress Note    Pt is being seen in f/u today regarding acute right lower extremity limb ischemia    Subjective  He states he feels well his pains been stable.   No complaints,  Tolerating diet, + UOP, +flatus, + BM, evaluated operative wound last night, which is stable    Current Medications:    sodium chloride 100 mL/hr at 03/26/20 0338    dextrose        metoprolol succinate, sodium phosphate IVPB **OR** sodium phosphate IVPB, zolpidem, sodium chloride flush, heparin flush, sodium chloride flush, potassium chloride **OR** potassium alternative oral replacement **OR** potassium chloride, acetaminophen **OR** acetaminophen, magnesium hydroxide, promethazine **OR** ondansetron, oxyCODONE-acetaminophen **OR** oxyCODONE-acetaminophen, labetalol, hydrALAZINE, glucose, dextrose, glucagon (rDNA), dextrose, sodium chloride flush    bacitracin zinc   Topical TID    docusate sodium  100 mg Oral BID    bisacodyl  10 mg Rectal Daily    bisacodyl  10 mg Oral Daily    insulin glargine  30 Units Subcutaneous BID    linezolid  600 mg Oral 2 times per day    tamsulosin  0.4 mg Oral Daily    miconazole  5 g/m2 Topical BID    insulin lispro  0-18 Units Subcutaneous TID WC    insulin lispro  0-9 Units Subcutaneous Nightly    lidocaine PF  5 mL Intradermal Once    apixaban  5 mg Oral BID    amLODIPine  10 mg Oral Daily    aspirin  81 mg Oral Daily    atorvastatin  10 mg Oral Daily    cilostazol  100 mg Oral BID    cloNIDine  0.1 mg Oral TID    DULoxetine  120 mg Oral QAM    lisinopril  10 mg Oral Daily    lubiprostone  24 mcg Oral BID WC    metoprolol  100 mg Oral BID    pregabalin  200 mg Oral TID    spironolactone  50 mg Oral QAM    sodium chloride flush  10 mL Intravenous 2 times per day    pantoprazole  40 mg Oral QAM AC        PHYSICAL EXAM:    BP (!) 123/59   Pulse 65   Temp 98.2 °F (36.8 °C) (Temporal)   Resp 20   Ht 6' 2\" (1.88 m)   Wt (!) 336 lb (152.4 kg)   SpO2 100%   BMI

## 2020-03-26 NOTE — PROGRESS NOTES
Physical Therapy  Physical Therapy Initial Assessment     Name: Andreina Ramirez  : 1955  MRN: [id number]    Referring Provider:  LORRAINE James    Date of Service: 3/26/2020    Evaluating PT:  Bhumika Bey PT, DPT [id number]    Room #:  8498/6700-N  Diagnosis:  Occlusion of common femoral artery  Precautions: Falls, R AKA, O2  Procedure/Surgery:  3/21 Right groin exploration, Right iliofemoral artery embolectomy with primary closure of arteriotomy, Right deep femoral artery embolectomy, Right lower extremity diagnostic incision evaluation, Right scrotal incision and drainage  PMHx/PSHx:  DM, HLD, HTN, Legionnaire's disease, PVD  Equipment Needs:  TBD        Attempted to see pt at 10: 48 , pt refused d/t pain. And asked therapist to return after lunch. Attempted to see pt @ [phone], pt refused stating he didn't feel well. Nsg present and aware.      Eloisa Don PTA [id number]

## 2020-03-26 NOTE — PLAN OF CARE
Problem: Pain:  Goal: Pain level will decrease  Description: Pain level will decrease  Outcome: Met This Shift     Problem: Falls - Risk of:  Goal: Will remain free from falls  Description: Will remain free from falls  Outcome: Met This Shift     Problem: Discharge Planning:  Goal: Ability to perform activities of daily living will improve  Description: Ability to perform activities of daily living will improve  Outcome: Met This Shift     Problem: Infection - Risk of, Surgical Site:  Goal: Demonstration of wound healing without infection will improve  Description: Demonstration of wound healing without infection will improve  Outcome: Met This Shift

## 2020-03-26 NOTE — PROGRESS NOTES
Date: 3/25/2020    Time: 10:04 PM    Patient Placed On BIPAP/CPAP/ Non-Invasive Ventilation? Yes    If no must comment. Facial area red/color change? No           If YES are Blister/Lesion present? No   If yes must notify nursing staff  BIPAP/CPAP skin barrier?   Yes    Skin barrier type:duoderm       Comments:        Melody Baltazar

## 2020-03-27 ENCOUNTER — TELEPHONE (OUTPATIENT)
Dept: VASCULAR SURGERY | Age: 65
End: 2020-03-27

## 2020-03-27 VITALS
WEIGHT: 315 LBS | RESPIRATION RATE: 18 BRPM | HEIGHT: 74 IN | DIASTOLIC BLOOD PRESSURE: 75 MMHG | SYSTOLIC BLOOD PRESSURE: 155 MMHG | HEART RATE: 74 BPM | TEMPERATURE: 97.6 F | BODY MASS INDEX: 40.43 KG/M2 | OXYGEN SATURATION: 94 %

## 2020-03-27 LAB
ALBUMIN SERPL-MCNC: 2 G/DL (ref 3.5–5.2)
ALP BLD-CCNC: 142 U/L (ref 40–129)
ALT SERPL-CCNC: 37 U/L (ref 0–40)
ANION GAP SERPL CALCULATED.3IONS-SCNC: 9 MMOL/L (ref 7–16)
AST SERPL-CCNC: 45 U/L (ref 0–39)
BILIRUB SERPL-MCNC: 0.3 MG/DL (ref 0–1.2)
BUN BLDV-MCNC: 16 MG/DL (ref 8–23)
CALCIUM IONIZED: 1.27 MMOL/L (ref 1.15–1.33)
CALCIUM SERPL-MCNC: 8.7 MG/DL (ref 8.6–10.2)
CHLORIDE BLD-SCNC: 106 MMOL/L (ref 98–107)
CO2: 24 MMOL/L (ref 22–29)
CREAT SERPL-MCNC: 1.5 MG/DL (ref 0.7–1.2)
GFR AFRICAN AMERICAN: 57
GFR NON-AFRICAN AMERICAN: 57 ML/MIN/1.73
GLUCOSE BLD-MCNC: 94 MG/DL (ref 74–99)
HCT VFR BLD CALC: 31.2 % (ref 37–54)
HEMOGLOBIN: 9.7 G/DL (ref 12.5–16.5)
MAGNESIUM: 1.9 MG/DL (ref 1.6–2.6)
MCH RBC QN AUTO: 25.5 PG (ref 26–35)
MCHC RBC AUTO-ENTMCNC: 31.1 % (ref 32–34.5)
MCV RBC AUTO: 82.1 FL (ref 80–99.9)
METER GLUCOSE: 178 MG/DL (ref 74–99)
METER GLUCOSE: 242 MG/DL (ref 74–99)
METER GLUCOSE: 92 MG/DL (ref 74–99)
PDW BLD-RTO: 14.6 FL (ref 11.5–15)
PHOSPHORUS: 2.4 MG/DL (ref 2.5–4.5)
PLATELET # BLD: 207 E9/L (ref 130–450)
PMV BLD AUTO: 12.3 FL (ref 7–12)
POTASSIUM SERPL-SCNC: 4.3 MMOL/L (ref 3.5–5)
RBC # BLD: 3.8 E12/L (ref 3.8–5.8)
SODIUM BLD-SCNC: 139 MMOL/L (ref 132–146)
TOTAL PROTEIN: 6.5 G/DL (ref 6.4–8.3)
WBC # BLD: 12.9 E9/L (ref 4.5–11.5)

## 2020-03-27 PROCEDURE — 6370000000 HC RX 637 (ALT 250 FOR IP): Performed by: STUDENT IN AN ORGANIZED HEALTH CARE EDUCATION/TRAINING PROGRAM

## 2020-03-27 PROCEDURE — 97530 THERAPEUTIC ACTIVITIES: CPT

## 2020-03-27 PROCEDURE — 6370000000 HC RX 637 (ALT 250 FOR IP): Performed by: PHYSICIAN ASSISTANT

## 2020-03-27 PROCEDURE — 85027 COMPLETE CBC AUTOMATED: CPT

## 2020-03-27 PROCEDURE — 84100 ASSAY OF PHOSPHORUS: CPT

## 2020-03-27 PROCEDURE — 2700000000 HC OXYGEN THERAPY PER DAY

## 2020-03-27 PROCEDURE — 94660 CPAP INITIATION&MGMT: CPT

## 2020-03-27 PROCEDURE — 80053 COMPREHEN METABOLIC PANEL: CPT

## 2020-03-27 PROCEDURE — 6370000000 HC RX 637 (ALT 250 FOR IP): Performed by: SURGERY

## 2020-03-27 PROCEDURE — 6370000000 HC RX 637 (ALT 250 FOR IP): Performed by: NURSE PRACTITIONER

## 2020-03-27 PROCEDURE — 82962 GLUCOSE BLOOD TEST: CPT

## 2020-03-27 PROCEDURE — 36415 COLL VENOUS BLD VENIPUNCTURE: CPT

## 2020-03-27 PROCEDURE — 6370000000 HC RX 637 (ALT 250 FOR IP): Performed by: INTERNAL MEDICINE

## 2020-03-27 PROCEDURE — 2580000003 HC RX 258: Performed by: PHYSICIAN ASSISTANT

## 2020-03-27 PROCEDURE — 83735 ASSAY OF MAGNESIUM: CPT

## 2020-03-27 PROCEDURE — 82330 ASSAY OF CALCIUM: CPT

## 2020-03-27 RX ADMIN — LUBIPROSTONE 24 MCG: 24 CAPSULE, GELATIN COATED ORAL at 08:31

## 2020-03-27 RX ADMIN — BACITRACIN ZINC: 500 OINTMENT TOPICAL at 13:54

## 2020-03-27 RX ADMIN — DOCUSATE SODIUM 100 MG: 100 CAPSULE, LIQUID FILLED ORAL at 08:31

## 2020-03-27 RX ADMIN — LUBIPROSTONE 24 MCG: 24 CAPSULE, GELATIN COATED ORAL at 16:36

## 2020-03-27 RX ADMIN — Medication 10 ML: at 08:32

## 2020-03-27 RX ADMIN — PREGABALIN 200 MG: 100 CAPSULE ORAL at 08:30

## 2020-03-27 RX ADMIN — PREGABALIN 200 MG: 100 CAPSULE ORAL at 13:54

## 2020-03-27 RX ADMIN — BACITRACIN ZINC: 500 OINTMENT TOPICAL at 08:32

## 2020-03-27 RX ADMIN — DULOXETINE HYDROCHLORIDE 120 MG: 60 CAPSULE, DELAYED RELEASE ORAL at 08:31

## 2020-03-27 RX ADMIN — CLONIDINE HYDROCHLORIDE 0.1 MG: 0.1 TABLET ORAL at 08:31

## 2020-03-27 RX ADMIN — AMLODIPINE BESYLATE 10 MG: 10 TABLET ORAL at 08:31

## 2020-03-27 RX ADMIN — CILOSTAZOL 100 MG: 100 TABLET ORAL at 08:31

## 2020-03-27 RX ADMIN — MICONAZOLE NITRATE 13 G: 20.6 POWDER TOPICAL at 08:32

## 2020-03-27 RX ADMIN — ASPIRIN 81 MG CHEWABLE TABLET 81 MG: 81 TABLET CHEWABLE at 08:31

## 2020-03-27 RX ADMIN — TAMSULOSIN HYDROCHLORIDE 0.4 MG: 0.4 CAPSULE ORAL at 08:31

## 2020-03-27 RX ADMIN — ATORVASTATIN CALCIUM 10 MG: 10 TABLET, FILM COATED ORAL at 08:31

## 2020-03-27 RX ADMIN — INSULIN LISPRO 3 UNITS: 100 INJECTION, SOLUTION INTRAVENOUS; SUBCUTANEOUS at 12:01

## 2020-03-27 RX ADMIN — INSULIN LISPRO 6 UNITS: 100 INJECTION, SOLUTION INTRAVENOUS; SUBCUTANEOUS at 16:36

## 2020-03-27 RX ADMIN — APIXABAN 5 MG: 5 TABLET, FILM COATED ORAL at 08:30

## 2020-03-27 RX ADMIN — LINEZOLID 600 MG: 600 TABLET, FILM COATED ORAL at 08:31

## 2020-03-27 RX ADMIN — OXYCODONE AND ACETAMINOPHEN 2 TABLET: 5; 325 TABLET ORAL at 08:31

## 2020-03-27 RX ADMIN — METOPROLOL TARTRATE 100 MG: 50 TABLET, FILM COATED ORAL at 08:30

## 2020-03-27 RX ADMIN — INSULIN GLARGINE 30 UNITS: 100 INJECTION, SOLUTION SUBCUTANEOUS at 08:34

## 2020-03-27 RX ADMIN — LISINOPRIL 10 MG: 10 TABLET ORAL at 08:31

## 2020-03-27 RX ADMIN — CLONIDINE HYDROCHLORIDE 0.1 MG: 0.1 TABLET ORAL at 13:54

## 2020-03-27 RX ADMIN — SPIRONOLACTONE 50 MG: 25 TABLET ORAL at 08:31

## 2020-03-27 RX ADMIN — PANTOPRAZOLE SODIUM 40 MG: 40 TABLET, DELAYED RELEASE ORAL at 06:04

## 2020-03-27 ASSESSMENT — PAIN DESCRIPTION - DESCRIPTORS: DESCRIPTORS: ACHING;CONSTANT;DISCOMFORT

## 2020-03-27 ASSESSMENT — PAIN DESCRIPTION - LOCATION: LOCATION: GROIN

## 2020-03-27 ASSESSMENT — PAIN DESCRIPTION - ONSET: ONSET: ON-GOING

## 2020-03-27 ASSESSMENT — PAIN DESCRIPTION - PAIN TYPE: TYPE: SURGICAL PAIN

## 2020-03-27 ASSESSMENT — PAIN SCALES - GENERAL
PAINLEVEL_OUTOF10: 0
PAINLEVEL_OUTOF10: 7
PAINLEVEL_OUTOF10: 4

## 2020-03-27 ASSESSMENT — PAIN DESCRIPTION - FREQUENCY: FREQUENCY: INTERMITTENT

## 2020-03-27 ASSESSMENT — PAIN DESCRIPTION - PROGRESSION: CLINICAL_PROGRESSION: GRADUALLY WORSENING

## 2020-03-27 NOTE — TELEPHONE ENCOUNTER
Unable to leave message, mailed appointment card for patient to see Dr. Talita Falcon on 5-4-20 at 9:15 am.

## 2020-03-27 NOTE — PROGRESS NOTES
OT BEDSIDE TREATMENT NOTE      Date:3/27/2020  Patient Name: Mira Anderson  MRN: [id number]  : 1955  Room: [facility]-A     Referring Provider: LORRAINE Song     Evaluating OT: Jeremy Boyd OTR/L [id number]     AM-PAC Daily Activity Raw Score:      Recommended Adaptive Equipment: TBA: LB dressing AE; drop arm 3in1 commode as needed         Diagnosis:  Occlusion of common femoral artery     Reason for admission: Presented to ED with R LE hip, R testicular pain.     Surgery: 3/21 Right groin exploration, Right iliofemoral artery embolectomy with primary closure of arteriotomy, Right deep femoral artery embolectomy, Right lower extremity diagnostic incision evaluation, Right scrotal incision and drainage      Pertinent Medical History: R AKA with prosthesis, DM, HLD, HTN, Legionnaire's disease, PVD     Precautions:  Falls, R AKA, O2, arterial line     Home Living: Pt lives alone  in a first floor apt with 1step(s) to enter and no rail(s); bed/bath on first floor  Bathroom setup: tub/shower with bench; standard height commode/no rails  Equipment owned: bed rails, tub transfer bench, [employer], crutches, WC     Prior Level of Function: Mod I with ADLs; Assist with IADLs. [place] aide 2 hrs/day to assist with housekeeping and transportation. Pt reports using crutches for stair negotiation, WW for bathroom mobility and WC for mobility in apt. Driving: no  Occupation: disability      Per OT Eval:        Pain Level: Pt complained of incisional pain this session        Cognition: A&O: 4/4    Follows 1-2 step commands with increased time.               Memory: F              Comprehension F              Problem solving: F              Judgement/safety: Fair-              Functional Assessment    Initial Eval Status  Date: 3/23 Treatment Status  Date: 3/27/20 STG=LTG  5-14  days    Feeding S; set up DNT                       Mod I  while seated up in chair to increase activity tolerance         Grooming Min A SBA  Pt washed face, applied deodorant                        S; set up   while seated/EOB/chair/WC sink level   UB dressing/bathing Max A  Limited by medical lines and activity tolerance  DNT  modA per previous level    Pt denying this session                       S; set up         LB dressing/bathing Max A  Seated EOB; decreased dynamic balance; decreased functional tolerance. Pt introduced to possible use of LB dressing AE for energy conservation. Pt not interested at this time. MAX A   oleg/doff sock L LE                        SBA/S   using AE as needed for safe reach/ energy conservation        Toileting Dep DNT  modA per previous level with urinal                             Bed Mobility  Rolling: SBA using bed rail     Supine to sit: Min A with  HOB elevated with min cues for technique using bed rails     Sit to supine: Min A SBA  EOB<>Supine                        Mod I  in prep of ADL tasks & transfers   Functional Transfers Sit to stand: NT     Stand to sit: NT modAx2  Sit to Stand  Stand to Sit                       SBA  sit<>stand/functional bathroom transfers using AD/DME as needed for balance and safety   Functional Mobility NT DNT                      SBA   functional/bathroom mobility using AD as needed & demonstrating G safety      Balance Sitting:     Static:  SBA    Dynamic:Min A  Standing: NT Sitting:   Static: SBA   Dynamic: MIN A   Standing: DNT S dynamic sitting balance; SBA dynamic standing balance  during ADL tasks & transfers   Endurance/Activity Tolerance    F tolerance with moderate activity. Pt sat EOB >8 min during trunk control and LB ADL activities. Poor+  Pt fatiguesd   G  tolerance with moderate activity/self care routine   Visual/  Perceptual    WFL                                    Comments: Upon arrival pt sitting EOB, with PT and nurse present, agreeable to OT. Pt educated on safety with bed mobility and sit to stand transfer, cueing for hand placement.  Pt educated and performing

## 2020-03-27 NOTE — PLAN OF CARE
Problem: Pain:  Goal: Pain level will decrease  Description: Pain level will decrease  Outcome: Met This Shift     Problem: Falls - Risk of:  Goal: Will remain free from falls  Description: Will remain free from falls  Outcome: Met This Shift     Problem: Discharge Planning:  Goal: Ability to perform activities of daily living will improve  Description: Ability to perform activities of daily living will improve  Outcome: Met This Shift

## 2020-03-27 NOTE — PROGRESS NOTES
Physical Therapy  Physical Therapy Initial Assessment     Name: Hillary Lopez  : 1955  MRN: [id number]    Referring Provider:  Prudy Gottron, PA    Date of Service: 3/27/2020    Evaluating PT:  Kandy Vela, PT, DPT [id number]    Room #:  7358/8803-V  Diagnosis:  Occlusion of common femoral artery  Precautions: Falls, R AKA, O2  Procedure/Surgery:  3/21 Right groin exploration, Right iliofemoral artery embolectomy with primary closure of arteriotomy, Right deep femoral artery embolectomy, Right lower extremity diagnostic incision evaluation, Right scrotal incision and drainage  PMHx/PSHx:  DM, HLD, HTN, Legionnaire's disease, PVD  Equipment Needs:  TBD    SUBJECTIVE:    Pt lives alone in a 1st floor apartment with 1 stairs to enter and no rail. [place] [id number] aide for homemaking and transportation 2hrs/day. Pt ambulated with crutches or WW for short distance and WC for primary means of transportation PTA. OBJECTIVE:   Initial Evaluation  Date: 3/23/20 Treatment  Date: 3/27/2020 Short Term/ Long Term   Goals   AM-PAC 6 Clicks 48/16 79/91    Was pt agreeable to Eval/treatment? Yes yes    Does pt have pain?  10/10 incisional pain 8/10 incisional pain    Bed Mobility  Rolling: SBA  Supine to sit: Mila  Sit to supine: Mila  Scooting: Mila Rolling: SBA  Supine to sit: SBA  Sit to supine: SBA  Scooting: SBA Mod Independent   Transfers Sit to stand: NT  Stand to sit: NT  Stand pivot: NT Sit to stand: mod +2 A  Stand to sit: mod +2 A  Bed elevated slightly  Stand pivot: NT Mod Independent with AAD   Ambulation   NT NT d/t prosthetic limb not present >15 feet with Mod Independent with AAD   Wheelchair mobility NT NT >100 feet Mod Independent on level surfaces   Stair negotiation: ascended and descended NT NT 1 curb step with crutches Mod Independent   ROM BUE:  Defer to OT note  BLE:  WNL     Strength BUE:  Defer to OT note  LLE:  4/5  Increase by 1/3 MMT grade   Balance Sitting EOB:  Mila dynamic  Dynamic Standing:  NT Sitting EOB: SBA  Dynamic standing: NT  Static standing: modA front [employer] Sitting EOB:  Independent  Dynamic Standing: Mod Independent with AAD           Patient education  On safety w/ functional mobility     Patient response to education:   Pt verbalized understanding Pt demonstrated skill Pt requires further education in this area   yes Yes w/cues yes     ASSESSMENT:    Comments:  Pt received supine in bed and agreeable to PT . Pt came to sit EOB as noted. Sat EOB x12 mins Supervision. Performed sit to stand to ww from elevated bed as noted. Stood for linen change, performed by nsg.  returned to sit EOB, pt has poor eccentric control w/ stand to sit. Pt c/o incisional pain and asked to return to supine. HOB elevated to pt comfort. Treatment:  Patient practiced and was instructed in the following treatment:     Therapeutic Activities Completed:   o Functional mobility training as noted above. VC required for bed mobility technique and safety     o Skilled repositioning for skin/joint protection  PLAN:    Patient is making fair progress towards established goals. Will continue with current POC.       Time in  1300  Time out  1325    Total Treatment Time  25 minutes     CPT codes:  [] Gait training 47094 0 minutes  [] Manual therapy 26410 0 minutes  [x] Therapeutic activities 68213 25 minutes  [] Therapeutic exercises 40320 0 minutes  [] Neuromuscular reeducation 01986 0 minutes    [employer] PTA [id number]

## 2020-03-27 NOTE — CARE COORDINATION
3/27 Transition of Care. Talked to [facility] about discharge plan. He now is in agreement to go to [facility]. He does understand that it is a non smoking facility. Will set up for discharge. HENS and paper work in soft chart.  Coco Ag RN cm

## 2020-03-27 NOTE — PROGRESS NOTES
Nurse to Nurse called to [facility] at [facility]. All pertinent information relayed, vital signs reviewed, all questions answered. Paperwork faxed.

## 2020-03-27 NOTE — PROGRESS NOTES
x 14 days on DC  Surgical culture positive for mixed gram-positive organisms-- CNS species, corynebact  ID Consult appreciated   Cardiology consult appreciated continue current medications, continue AC, recommend event monitor versus ILR in 6 months prior to discontinuing anticoagulation  Increase Lantus  PMR consult  SNF      Electronically signed by Maryana Weiss MD on 3/27/2020 at 9:36 AM

## 2020-03-27 NOTE — CARE COORDINATION
SOCIAL WORK/CASEMANAGEMENT TRANSITION OF CARE PLANNING: pt was accepted to [facility]. [facility]  at 4p.m. I called wife and left vm to drop off prothesis at the [facility]. Snf;loc. Pradeep Villalba  3/27/2020    nmt auth# [id number] now switched to ambulance due to inability to to transfer. I called nmt for this auth.  Pradeep Villalba  3/27/2020

## 2020-03-31 ENCOUNTER — TELEPHONE (OUTPATIENT)
Dept: CARDIOLOGY CLINIC | Age: 65
End: 2020-03-31

## 2020-04-03 NOTE — ADDENDUM NOTE
Addendum  created 04/03/20 9610 by Olinda Black MD    Clinical Note Signed, Review and Sign - Ready for Procedure

## 2020-04-13 RX ORDER — HYDROCODONE BITARTRATE AND ACETAMINOPHEN 5; 325 MG/1; MG/1
1 TABLET ORAL EVERY 4 HOURS PRN
Qty: 120 TABLET | Refills: 0 | Status: ON HOLD
Start: 2020-04-13 | End: 2020-05-15

## 2020-04-13 RX ORDER — OXYCODONE HYDROCHLORIDE AND ACETAMINOPHEN 5; 325 MG/1; MG/1
1 TABLET ORAL EVERY 6 HOURS PRN
Qty: 28 TABLET | Refills: 0 | Status: CANCELLED | OUTPATIENT
Start: 2020-04-13 | End: 2020-04-20

## 2020-04-13 RX ORDER — OXYCODONE 13.5 MG/1
13.5 CAPSULE, EXTENDED RELEASE ORAL 2 TIMES DAILY
Qty: 60 EACH | Refills: 0 | Status: ON HOLD
Start: 2020-04-13 | End: 2020-04-16

## 2020-04-16 ENCOUNTER — HOSPITAL ENCOUNTER (INPATIENT)
Age: 65
LOS: 18 days | Discharge: NURSING, REHAB OR LONG-TERM CARE | DRG: 463 | End: 2020-05-04
Attending: EMERGENCY MEDICINE | Admitting: INTERNAL MEDICINE
Payer: COMMERCIAL

## 2020-04-16 PROBLEM — L08.9 WOUND INFECTION: Status: ACTIVE | Noted: 2020-04-16

## 2020-04-16 PROBLEM — T14.8XXA WOUND INFECTION: Status: ACTIVE | Noted: 2020-04-16

## 2020-04-16 LAB
ALBUMIN SERPL-MCNC: 2.6 G/DL (ref 3.5–5.2)
ALP BLD-CCNC: 149 U/L (ref 40–129)
ALT SERPL-CCNC: 92 U/L (ref 0–40)
ANION GAP SERPL CALCULATED.3IONS-SCNC: 11 MMOL/L (ref 7–16)
APTT: 31.6 SEC (ref 24.5–35.1)
AST SERPL-CCNC: 57 U/L (ref 0–39)
BASOPHILS ABSOLUTE: 0.05 E9/L (ref 0–0.2)
BASOPHILS RELATIVE PERCENT: 0.4 % (ref 0–2)
BILIRUB SERPL-MCNC: 0.4 MG/DL (ref 0–1.2)
BUN BLDV-MCNC: 19 MG/DL (ref 8–23)
CALCIUM SERPL-MCNC: 8.6 MG/DL (ref 8.6–10.2)
CHLORIDE BLD-SCNC: 101 MMOL/L (ref 98–107)
CO2: 25 MMOL/L (ref 22–29)
CREAT SERPL-MCNC: 1.3 MG/DL (ref 0.7–1.2)
EOSINOPHILS ABSOLUTE: 0.16 E9/L (ref 0.05–0.5)
EOSINOPHILS RELATIVE PERCENT: 1.2 % (ref 0–6)
GFR AFRICAN AMERICAN: >60
GFR NON-AFRICAN AMERICAN: >60 ML/MIN/1.73
GLUCOSE BLD-MCNC: 186 MG/DL (ref 74–99)
HBA1C MFR BLD: 10.1 % (ref 4–5.6)
HCT VFR BLD CALC: 29.4 % (ref 37–54)
HEMOGLOBIN: 9.2 G/DL (ref 12.5–16.5)
IMMATURE GRANULOCYTES #: 0.11 E9/L
IMMATURE GRANULOCYTES %: 0.8 % (ref 0–5)
INR BLD: 1.6
LACTIC ACID, SEPSIS: 1.7 MMOL/L (ref 0.5–1.9)
LACTIC ACID, SEPSIS: 2.1 MMOL/L (ref 0.5–1.9)
LYMPHOCYTES ABSOLUTE: 1.47 E9/L (ref 1.5–4)
LYMPHOCYTES RELATIVE PERCENT: 10.9 % (ref 20–42)
MCH RBC QN AUTO: 26.3 PG (ref 26–35)
MCHC RBC AUTO-ENTMCNC: 31.3 % (ref 32–34.5)
MCV RBC AUTO: 84 FL (ref 80–99.9)
METER GLUCOSE: 226 MG/DL (ref 74–99)
MONOCYTES ABSOLUTE: 1.14 E9/L (ref 0.1–0.95)
MONOCYTES RELATIVE PERCENT: 8.5 % (ref 2–12)
NEUTROPHILS ABSOLUTE: 10.52 E9/L (ref 1.8–7.3)
NEUTROPHILS RELATIVE PERCENT: 78.2 % (ref 43–80)
PDW BLD-RTO: 14.6 FL (ref 11.5–15)
PLATELET # BLD: 324 E9/L (ref 130–450)
PMV BLD AUTO: 11.6 FL (ref 7–12)
POTASSIUM REFLEX MAGNESIUM: 4.4 MMOL/L (ref 3.5–5)
PROCALCITONIN: 0.22 NG/ML (ref 0–0.08)
PROTHROMBIN TIME: 18.4 SEC (ref 9.3–12.4)
RBC # BLD: 3.5 E12/L (ref 3.8–5.8)
SODIUM BLD-SCNC: 137 MMOL/L (ref 132–146)
TOTAL PROTEIN: 7 G/DL (ref 6.4–8.3)
WBC # BLD: 13.5 E9/L (ref 4.5–11.5)

## 2020-04-16 PROCEDURE — 84145 PROCALCITONIN (PCT): CPT

## 2020-04-16 PROCEDURE — 85730 THROMBOPLASTIN TIME PARTIAL: CPT

## 2020-04-16 PROCEDURE — 87070 CULTURE OTHR SPECIMN AEROBIC: CPT

## 2020-04-16 PROCEDURE — 6360000002 HC RX W HCPCS: Performed by: INTERNAL MEDICINE

## 2020-04-16 PROCEDURE — 96365 THER/PROPH/DIAG IV INF INIT: CPT

## 2020-04-16 PROCEDURE — 6370000000 HC RX 637 (ALT 250 FOR IP): Performed by: INTERNAL MEDICINE

## 2020-04-16 PROCEDURE — 2500000003 HC RX 250 WO HCPCS: Performed by: INTERNAL MEDICINE

## 2020-04-16 PROCEDURE — 83036 HEMOGLOBIN GLYCOSYLATED A1C: CPT

## 2020-04-16 PROCEDURE — 87040 BLOOD CULTURE FOR BACTERIA: CPT

## 2020-04-16 PROCEDURE — 6360000002 HC RX W HCPCS: Performed by: EMERGENCY MEDICINE

## 2020-04-16 PROCEDURE — 85025 COMPLETE CBC W/AUTO DIFF WBC: CPT

## 2020-04-16 PROCEDURE — 80053 COMPREHEN METABOLIC PANEL: CPT

## 2020-04-16 PROCEDURE — 99285 EMERGENCY DEPT VISIT HI MDM: CPT

## 2020-04-16 PROCEDURE — 36415 COLL VENOUS BLD VENIPUNCTURE: CPT

## 2020-04-16 PROCEDURE — 83605 ASSAY OF LACTIC ACID: CPT

## 2020-04-16 PROCEDURE — 82962 GLUCOSE BLOOD TEST: CPT

## 2020-04-16 PROCEDURE — 2580000003 HC RX 258: Performed by: INTERNAL MEDICINE

## 2020-04-16 PROCEDURE — 85610 PROTHROMBIN TIME: CPT

## 2020-04-16 PROCEDURE — 2580000003 HC RX 258: Performed by: EMERGENCY MEDICINE

## 2020-04-16 PROCEDURE — 2060000000 HC ICU INTERMEDIATE R&B

## 2020-04-16 PROCEDURE — 87186 SC STD MICRODIL/AGAR DIL: CPT

## 2020-04-16 PROCEDURE — 93005 ELECTROCARDIOGRAM TRACING: CPT | Performed by: EMERGENCY MEDICINE

## 2020-04-16 PROCEDURE — 87077 CULTURE AEROBIC IDENTIFY: CPT

## 2020-04-16 RX ORDER — SENNA PLUS 8.6 MG/1
1 TABLET ORAL DAILY
Status: DISCONTINUED | OUTPATIENT
Start: 2020-04-16 | End: 2020-05-04 | Stop reason: HOSPADM

## 2020-04-16 RX ORDER — SPIRONOLACTONE 25 MG/1
50 TABLET ORAL EVERY MORNING
Status: DISCONTINUED | OUTPATIENT
Start: 2020-04-17 | End: 2020-05-01

## 2020-04-16 RX ORDER — AMLODIPINE BESYLATE 5 MG/1
5 TABLET ORAL DAILY
Status: DISCONTINUED | OUTPATIENT
Start: 2020-04-16 | End: 2020-05-04 | Stop reason: HOSPADM

## 2020-04-16 RX ORDER — ACETAMINOPHEN 500 MG
500 TABLET ORAL EVERY 4 HOURS PRN
Status: ON HOLD | COMMUNITY
End: 2020-06-23 | Stop reason: HOSPADM

## 2020-04-16 RX ORDER — FERROUS SULFATE 325(65) MG
325 TABLET ORAL 2 TIMES DAILY WITH MEALS
Status: DISCONTINUED | OUTPATIENT
Start: 2020-04-16 | End: 2020-05-04 | Stop reason: HOSPADM

## 2020-04-16 RX ORDER — PROMETHAZINE HYDROCHLORIDE 12.5 MG/1
12.5 TABLET ORAL EVERY 6 HOURS PRN
Status: ON HOLD | COMMUNITY
End: 2020-05-04 | Stop reason: HOSPADM

## 2020-04-16 RX ORDER — OXYCODONE HCL 10 MG/1
10 TABLET, FILM COATED, EXTENDED RELEASE ORAL EVERY 4 HOURS PRN
Status: ON HOLD | COMMUNITY
End: 2020-05-04 | Stop reason: SDUPTHER

## 2020-04-16 RX ORDER — BISACODYL 10 MG
10 SUPPOSITORY, RECTAL RECTAL DAILY PRN
Status: ON HOLD | COMMUNITY
End: 2020-06-23 | Stop reason: HOSPADM

## 2020-04-16 RX ORDER — PROMETHAZINE HYDROCHLORIDE 25 MG/1
12.5 TABLET ORAL EVERY 6 HOURS PRN
Status: DISCONTINUED | OUTPATIENT
Start: 2020-04-16 | End: 2020-05-04 | Stop reason: HOSPADM

## 2020-04-16 RX ORDER — ACETAMINOPHEN 325 MG/1
650 TABLET ORAL EVERY 4 HOURS PRN
COMMUNITY
End: 2020-10-13

## 2020-04-16 RX ORDER — INSULIN GLARGINE 100 [IU]/ML
15 INJECTION, SOLUTION SUBCUTANEOUS 2 TIMES DAILY
Status: DISCONTINUED | OUTPATIENT
Start: 2020-04-16 | End: 2020-05-04 | Stop reason: HOSPADM

## 2020-04-16 RX ORDER — ASPIRIN 81 MG/1
81 TABLET, CHEWABLE ORAL DAILY
Status: DISCONTINUED | OUTPATIENT
Start: 2020-04-16 | End: 2020-05-04 | Stop reason: HOSPADM

## 2020-04-16 RX ORDER — HYDROCODONE BITARTRATE AND ACETAMINOPHEN 5; 325 MG/1; MG/1
1 TABLET ORAL EVERY 4 HOURS PRN
Status: DISCONTINUED | OUTPATIENT
Start: 2020-04-16 | End: 2020-04-20

## 2020-04-16 RX ORDER — CILOSTAZOL 100 MG/1
100 TABLET ORAL 2 TIMES DAILY
Status: DISCONTINUED | OUTPATIENT
Start: 2020-04-16 | End: 2020-05-04 | Stop reason: HOSPADM

## 2020-04-16 RX ORDER — SODIUM CHLORIDE 0.9 % (FLUSH) 0.9 %
10 SYRINGE (ML) INJECTION PRN
Status: DISCONTINUED | OUTPATIENT
Start: 2020-04-16 | End: 2020-04-17 | Stop reason: SDUPTHER

## 2020-04-16 RX ORDER — TRAZODONE HYDROCHLORIDE 50 MG/1
50 TABLET ORAL NIGHTLY
Status: ON HOLD | COMMUNITY
End: 2020-06-23 | Stop reason: HOSPADM

## 2020-04-16 RX ORDER — CLONIDINE HYDROCHLORIDE 0.1 MG/1
0.1 TABLET ORAL 2 TIMES DAILY
Status: DISCONTINUED | OUTPATIENT
Start: 2020-04-16 | End: 2020-05-04 | Stop reason: HOSPADM

## 2020-04-16 RX ORDER — ACETAMINOPHEN 650 MG/1
650 SUPPOSITORY RECTAL EVERY 6 HOURS PRN
Status: DISCONTINUED | OUTPATIENT
Start: 2020-04-16 | End: 2020-04-22

## 2020-04-16 RX ORDER — LUBIPROSTONE 24 UG/1
24 CAPSULE, GELATIN COATED ORAL 2 TIMES DAILY WITH MEALS
Status: DISCONTINUED | OUTPATIENT
Start: 2020-04-16 | End: 2020-05-04 | Stop reason: HOSPADM

## 2020-04-16 RX ORDER — LISINOPRIL 10 MG/1
10 TABLET ORAL DAILY
Status: DISCONTINUED | OUTPATIENT
Start: 2020-04-16 | End: 2020-05-01

## 2020-04-16 RX ORDER — ATORVASTATIN CALCIUM 10 MG/1
10 TABLET, FILM COATED ORAL DAILY
Status: DISCONTINUED | OUTPATIENT
Start: 2020-04-16 | End: 2020-05-04 | Stop reason: HOSPADM

## 2020-04-16 RX ORDER — DOCUSATE SODIUM 100 MG/1
100 CAPSULE, LIQUID FILLED ORAL 2 TIMES DAILY
Status: DISCONTINUED | OUTPATIENT
Start: 2020-04-16 | End: 2020-05-04 | Stop reason: HOSPADM

## 2020-04-16 RX ORDER — ONDANSETRON 2 MG/ML
4 INJECTION INTRAMUSCULAR; INTRAVENOUS EVERY 6 HOURS PRN
Status: DISCONTINUED | OUTPATIENT
Start: 2020-04-16 | End: 2020-05-04 | Stop reason: HOSPADM

## 2020-04-16 RX ORDER — DULOXETIN HYDROCHLORIDE 60 MG/1
120 CAPSULE, DELAYED RELEASE ORAL EVERY MORNING
Status: DISCONTINUED | OUTPATIENT
Start: 2020-04-17 | End: 2020-05-04 | Stop reason: HOSPADM

## 2020-04-16 RX ORDER — TAMSULOSIN HYDROCHLORIDE 0.4 MG/1
0.4 CAPSULE ORAL DAILY
Status: DISCONTINUED | OUTPATIENT
Start: 2020-04-16 | End: 2020-05-04 | Stop reason: HOSPADM

## 2020-04-16 RX ORDER — SODIUM CHLORIDE 0.9 % (FLUSH) 0.9 %
10 SYRINGE (ML) INJECTION EVERY 12 HOURS SCHEDULED
Status: DISCONTINUED | OUTPATIENT
Start: 2020-04-16 | End: 2020-04-17 | Stop reason: SDUPTHER

## 2020-04-16 RX ORDER — PANTOPRAZOLE SODIUM 40 MG/1
40 TABLET, DELAYED RELEASE ORAL
Status: DISCONTINUED | OUTPATIENT
Start: 2020-04-17 | End: 2020-05-04 | Stop reason: HOSPADM

## 2020-04-16 RX ORDER — PREGABALIN 100 MG/1
200 CAPSULE ORAL 3 TIMES DAILY
Status: DISCONTINUED | OUTPATIENT
Start: 2020-04-16 | End: 2020-05-04 | Stop reason: HOSPADM

## 2020-04-16 RX ORDER — ACETAMINOPHEN 325 MG/1
650 TABLET ORAL EVERY 6 HOURS PRN
Status: DISCONTINUED | OUTPATIENT
Start: 2020-04-16 | End: 2020-04-22

## 2020-04-16 RX ORDER — SODIUM CHLORIDE 9 MG/ML
INJECTION, SOLUTION INTRAVENOUS CONTINUOUS
Status: DISCONTINUED | OUTPATIENT
Start: 2020-04-16 | End: 2020-05-04 | Stop reason: HOSPADM

## 2020-04-16 RX ORDER — LIDOCAINE 4 G/G
1 PATCH TOPICAL DAILY
Status: DISCONTINUED | OUTPATIENT
Start: 2020-04-16 | End: 2020-05-04 | Stop reason: HOSPADM

## 2020-04-16 RX ORDER — DEXTROSE MONOHYDRATE 50 MG/ML
100 INJECTION, SOLUTION INTRAVENOUS PRN
Status: DISCONTINUED | OUTPATIENT
Start: 2020-04-16 | End: 2020-05-04 | Stop reason: HOSPADM

## 2020-04-16 RX ORDER — GINSENG 100 MG
CAPSULE ORAL 2 TIMES DAILY
Status: ON HOLD | COMMUNITY
End: 2020-06-23 | Stop reason: HOSPADM

## 2020-04-16 RX ORDER — NICOTINE POLACRILEX 4 MG
15 LOZENGE BUCCAL PRN
Status: DISCONTINUED | OUTPATIENT
Start: 2020-04-16 | End: 2020-05-04 | Stop reason: HOSPADM

## 2020-04-16 RX ORDER — DEXTROSE MONOHYDRATE 25 G/50ML
12.5 INJECTION, SOLUTION INTRAVENOUS PRN
Status: DISCONTINUED | OUTPATIENT
Start: 2020-04-16 | End: 2020-05-04 | Stop reason: HOSPADM

## 2020-04-16 RX ORDER — TIZANIDINE 4 MG/1
4 TABLET ORAL EVERY 8 HOURS PRN
Status: DISCONTINUED | OUTPATIENT
Start: 2020-04-16 | End: 2020-05-04 | Stop reason: HOSPADM

## 2020-04-16 RX ADMIN — INSULIN LISPRO 1 UNITS: 100 INJECTION, SOLUTION INTRAVENOUS; SUBCUTANEOUS at 21:26

## 2020-04-16 RX ADMIN — LUBIPROSTONE 24 MCG: 24 CAPSULE, GELATIN COATED ORAL at 21:27

## 2020-04-16 RX ADMIN — INSULIN GLARGINE 15 UNITS: 100 INJECTION, SOLUTION SUBCUTANEOUS at 21:26

## 2020-04-16 RX ADMIN — PREGABALIN 200 MG: 100 CAPSULE ORAL at 21:27

## 2020-04-16 RX ADMIN — METOPROLOL TARTRATE 25 MG: 25 TABLET, FILM COATED ORAL at 21:27

## 2020-04-16 RX ADMIN — ACETAMINOPHEN 650 MG: 325 TABLET, FILM COATED ORAL at 21:25

## 2020-04-16 RX ADMIN — CLONIDINE HYDROCHLORIDE 0.1 MG: 0.1 TABLET ORAL at 21:27

## 2020-04-16 RX ADMIN — CILOSTAZOL 100 MG: 100 TABLET ORAL at 21:27

## 2020-04-16 RX ADMIN — VANCOMYCIN HYDROCHLORIDE 3000 MG: 1 INJECTION, POWDER, LYOPHILIZED, FOR SOLUTION INTRAVENOUS at 17:10

## 2020-04-16 RX ADMIN — FERROUS SULFATE TAB 325 MG (65 MG ELEMENTAL FE) 325 MG: 325 (65 FE) TAB at 21:27

## 2020-04-16 RX ADMIN — ENOXAPARIN SODIUM 40 MG: 100 INJECTION SUBCUTANEOUS at 21:26

## 2020-04-16 RX ADMIN — SODIUM CHLORIDE, PRESERVATIVE FREE 10 ML: 5 INJECTION INTRAVENOUS at 21:27

## 2020-04-16 RX ADMIN — DOCUSATE SODIUM 100 MG: 100 CAPSULE, LIQUID FILLED ORAL at 21:27

## 2020-04-16 RX ADMIN — SODIUM CHLORIDE: 9 INJECTION, SOLUTION INTRAVENOUS at 21:34

## 2020-04-16 RX ADMIN — MICONAZOLE NITRATE: 20.6 POWDER TOPICAL at 21:33

## 2020-04-16 RX ADMIN — PIPERACILLIN AND TAZOBACTAM 4.5 G: 4; .5 INJECTION, POWDER, FOR SOLUTION INTRAVENOUS at 16:03

## 2020-04-16 ASSESSMENT — PAIN SCALES - GENERAL
PAINLEVEL_OUTOF10: 5
PAINLEVEL_OUTOF10: 8
PAINLEVEL_OUTOF10: 8

## 2020-04-16 ASSESSMENT — PAIN DESCRIPTION - ORIENTATION
ORIENTATION: RIGHT

## 2020-04-16 ASSESSMENT — PAIN DESCRIPTION - PAIN TYPE
TYPE: ACUTE PAIN

## 2020-04-16 ASSESSMENT — PAIN DESCRIPTION - LOCATION
LOCATION: LEG

## 2020-04-16 NOTE — ED NOTES
Patient right limb cleaned and dry from wound drainage. Clean sheets and lift sheet applied under pt limb.      Kandy Phalen, RN  04/16/20 5542

## 2020-04-16 NOTE — ED PROVIDER NOTES
ensure accuracy; however, inadvertent computerized transcription errors may be present        Lexi Weldon MD  04/16/20 2692

## 2020-04-16 NOTE — ED NOTES
Pt unable to void at this time,urinal given to patient to give specimen when able to urinate.      Delia William RN  04/16/20 5462

## 2020-04-16 NOTE — CONSULTS
5-325 MG per tablet Take 1 tablet by mouth every 4 hours as needed for Pain for up to 5 days. Intended supply: 5 days. Take lowest dose possible to manage pain 4/13/20 4/18/20  Juanito Kline,    apixaban (ELIQUIS) 5 MG TABS tablet Take 1 tablet by mouth 2 times daily 3/26/20   Jimmy Pretty MD   docusate sodium (COLACE) 100 MG capsule Take 1 capsule by mouth 2 times daily 3/26/20   Jimmy Pretty MD   senna (SENOKOT) 8.6 MG TABS tablet Take 1 tablet by mouth daily 3/26/20   Jimmy Pretty MD   miconazole (MICOTIN) 2 % powder Apply topically 2 times daily.  3/26/20   Sravanthi Luo MD   tamsulosin [facility]) 0.4 MG capsule Take 1 capsule by mouth daily 3/27/20   Sravanthi Luo MD   ONE TOUCH ULTRA TEST strip USE TO TEST BLOOD SUGARS DAILY AS NEEDED 3/9/20   Juanito Kline DO   DULoxetine (CYMBALTA) 60 MG extended release capsule TAKE 2 CAPSULES BY MOUTH EVERY MORNING 2/4/20   Juanito Kline,    cloNIDine (CATAPRES) 0.1 MG tablet TAKE 1 TABLET BY MOUTH 3 TIMES A DAY 2/4/20   Juanito Kline,    amLODIPine (NORVASC) 10 MG tablet TAKE ONE TABLET BY MOUTH EVERY MORNING - HOLD IF SBP<140 2/4/20   Juanito Kline,    LANTUS 100 UNIT/ML injection vial INJECT 15 UNITS INTO THE SKIN TWO TIMES A DAY 2/4/20   Juanito Kline,    lubiprostone (AMITIZA) 24 MCG capsule Take 1 capsule by mouth 2 times daily (with meals) 1/20/20   Juanito Kline,    metoprolol (LOPRESSOR) 100 MG tablet TAKE 1 TABLET BY MOUTH 2 TIMES A DAY 1/8/20   Juanito Kline,    insulin lispro (HUMALOG) 100 UNIT/ML injection vial Inject 5 Units into the skin 3 times daily (with meals) 11/11/19   Juanito Kline,    Handicap Placard MISC by Does not apply route Patient cannot walk 200 ft without stopping to rest.    Expiration 10/21/2024 10/21/19   Juanito A Deist, DO   LANCETS MICRO THIN 08A MISC 1 applicator by Does not apply route daily 6/14/19   Juanito Kline, DO   Insulin Syringe-Needle U-100 30G X 5/16\" 0.3 ML MISC 1 box by Does not Drug use: No    Sexual activity: Not on file   Lifestyle    Physical activity     Days per week: Not on file     Minutes per session: Not on file    Stress: Not on file   Relationships    Social connections     Talks on phone: Not on file     Gets together: Not on file     Attends [employer]ism service: Not on file     Active member of club or organization: Not on file     Attends meetings of clubs or organizations: Not on file     Relationship status: Not on file    Intimate partner violence     Fear of current or ex partner: Not on file     Emotionally abused: Not on file     Physically abused: Not on file     Forced sexual activity: Not on file   Other Topics Concern    Not on file   Social History Narrative    Not on file        Family History   Problem Relation Age of Onset    Cancer Mother     Diabetes Father     Heart Failure Father     Hypertension Father     Cancer Sister        PHYSICAL EXAM:    /78   Pulse 81   Temp 96.9 °F (36.1 °C)   Resp 18   Ht 6' 2\" (1.88 m)   Wt (!) 335 lb (152 kg)   SpO2 96%   BMI 43.01 kg/m²   GENERAL:  No acute distress. Alert and conversational.   HEAD:  Normocephalic, atraumatic. EYES:  No scleral icterus. Conjugate gaze. ENT/NECK:  Trachea midline, mucous membranes dry. LUNGS:  No cough. Nonlabored breathing on room air. CARDIOVASC:  Normal rate, regular rhythm. ABDOMEN:  Soft, non-distended, non-tender. LIMBS:  R AKA with significant eschar of anterior thigh and thin purulent drainage expressible from groin incision and lateral anterior thigh incision, sutures intact. Tender focally around thigh incision and proximal to that at the healthy tissue as well.   NEURO:  Face symmetric, moves all extremities  SKIN:  Warm, dry, except R groin is very moist with drainage        EXTREMITIES:   R UE Swelling absent Incisions absent       5/5 Strength  L UE Swelling absent Incisions absent       5/5 Strength  R LE Edema present  Incisions present (groin,

## 2020-04-17 ENCOUNTER — ANESTHESIA (OUTPATIENT)
Dept: OPERATING ROOM | Age: 65
DRG: 463 | End: 2020-04-17
Payer: COMMERCIAL

## 2020-04-17 ENCOUNTER — ANESTHESIA EVENT (OUTPATIENT)
Dept: OPERATING ROOM | Age: 65
DRG: 463 | End: 2020-04-17
Payer: COMMERCIAL

## 2020-04-17 VITALS — SYSTOLIC BLOOD PRESSURE: 143 MMHG | OXYGEN SATURATION: 99 % | DIASTOLIC BLOOD PRESSURE: 82 MMHG

## 2020-04-17 LAB
ABO/RH: NORMAL
ANION GAP SERPL CALCULATED.3IONS-SCNC: 9 MMOL/L (ref 7–16)
ANTIBODY SCREEN: NORMAL
BUN BLDV-MCNC: 16 MG/DL (ref 8–23)
CALCIUM SERPL-MCNC: 8.6 MG/DL (ref 8.6–10.2)
CHLORIDE BLD-SCNC: 97 MMOL/L (ref 98–107)
CO2: 26 MMOL/L (ref 22–29)
CREAT SERPL-MCNC: 1.3 MG/DL (ref 0.7–1.2)
EKG ATRIAL RATE: 79 BPM
EKG P AXIS: 21 DEGREES
EKG P-R INTERVAL: 244 MS
EKG Q-T INTERVAL: 426 MS
EKG QRS DURATION: 98 MS
EKG QTC CALCULATION (BAZETT): 488 MS
EKG R AXIS: 14 DEGREES
EKG T AXIS: 46 DEGREES
EKG VENTRICULAR RATE: 79 BPM
GFR AFRICAN AMERICAN: >60
GFR NON-AFRICAN AMERICAN: >60 ML/MIN/1.73
GLUCOSE BLD-MCNC: 137 MG/DL (ref 74–99)
HCT VFR BLD CALC: 29.4 % (ref 37–54)
HEMOGLOBIN: 9.2 G/DL (ref 12.5–16.5)
MAGNESIUM: 1.8 MG/DL (ref 1.6–2.6)
MCH RBC QN AUTO: 26.3 PG (ref 26–35)
MCHC RBC AUTO-ENTMCNC: 31.3 % (ref 32–34.5)
MCV RBC AUTO: 84 FL (ref 80–99.9)
METER GLUCOSE: 168 MG/DL (ref 74–99)
METER GLUCOSE: 183 MG/DL (ref 74–99)
METER GLUCOSE: 225 MG/DL (ref 74–99)
METER GLUCOSE: 257 MG/DL (ref 74–99)
PDW BLD-RTO: 14.6 FL (ref 11.5–15)
PLATELET # BLD: 330 E9/L (ref 130–450)
PMV BLD AUTO: 11.8 FL (ref 7–12)
POTASSIUM REFLEX MAGNESIUM: 5.3 MMOL/L (ref 3.5–5)
RBC # BLD: 3.5 E12/L (ref 3.8–5.8)
SODIUM BLD-SCNC: 132 MMOL/L (ref 132–146)
WBC # BLD: 12.2 E9/L (ref 4.5–11.5)

## 2020-04-17 PROCEDURE — 87070 CULTURE OTHR SPECIMN AEROBIC: CPT

## 2020-04-17 PROCEDURE — 2500000003 HC RX 250 WO HCPCS: Performed by: NURSE ANESTHETIST, CERTIFIED REGISTERED

## 2020-04-17 PROCEDURE — 6360000002 HC RX W HCPCS: Performed by: INTERNAL MEDICINE

## 2020-04-17 PROCEDURE — 7100000000 HC PACU RECOVERY - FIRST 15 MIN: Performed by: SURGERY

## 2020-04-17 PROCEDURE — 86850 RBC ANTIBODY SCREEN: CPT

## 2020-04-17 PROCEDURE — 2580000003 HC RX 258: Performed by: SPECIALIST

## 2020-04-17 PROCEDURE — 6370000000 HC RX 637 (ALT 250 FOR IP): Performed by: SURGERY

## 2020-04-17 PROCEDURE — 6370000000 HC RX 637 (ALT 250 FOR IP): Performed by: STUDENT IN AN ORGANIZED HEALTH CARE EDUCATION/TRAINING PROGRAM

## 2020-04-17 PROCEDURE — 3700000001 HC ADD 15 MINUTES (ANESTHESIA): Performed by: SURGERY

## 2020-04-17 PROCEDURE — 51798 US URINE CAPACITY MEASURE: CPT

## 2020-04-17 PROCEDURE — 88305 TISSUE EXAM BY PATHOLOGIST: CPT

## 2020-04-17 PROCEDURE — 6360000002 HC RX W HCPCS: Performed by: SPECIALIST

## 2020-04-17 PROCEDURE — 3600000002 HC SURGERY LEVEL 2 BASE: Performed by: SURGERY

## 2020-04-17 PROCEDURE — 99223 1ST HOSP IP/OBS HIGH 75: CPT | Performed by: SURGERY

## 2020-04-17 PROCEDURE — 2580000003 HC RX 258: Performed by: NURSE ANESTHETIST, CERTIFIED REGISTERED

## 2020-04-17 PROCEDURE — 11042 DBRDMT SUBQ TIS 1ST 20SQCM/<: CPT | Performed by: SURGERY

## 2020-04-17 PROCEDURE — 87205 SMEAR GRAM STAIN: CPT

## 2020-04-17 PROCEDURE — 7100000001 HC PACU RECOVERY - ADDTL 15 MIN: Performed by: SURGERY

## 2020-04-17 PROCEDURE — 11045 DBRDMT SUBQ TISS EACH ADDL: CPT | Performed by: SURGERY

## 2020-04-17 PROCEDURE — 86901 BLOOD TYPING SEROLOGIC RH(D): CPT

## 2020-04-17 PROCEDURE — 83735 ASSAY OF MAGNESIUM: CPT

## 2020-04-17 PROCEDURE — 0JBL0ZZ EXCISION OF RIGHT UPPER LEG SUBCUTANEOUS TISSUE AND FASCIA, OPEN APPROACH: ICD-10-PCS | Performed by: SURGERY

## 2020-04-17 PROCEDURE — 80048 BASIC METABOLIC PNL TOTAL CA: CPT

## 2020-04-17 PROCEDURE — 82962 GLUCOSE BLOOD TEST: CPT

## 2020-04-17 PROCEDURE — 3700000000 HC ANESTHESIA ATTENDED CARE: Performed by: SURGERY

## 2020-04-17 PROCEDURE — 85027 COMPLETE CBC AUTOMATED: CPT

## 2020-04-17 PROCEDURE — 36415 COLL VENOUS BLD VENIPUNCTURE: CPT

## 2020-04-17 PROCEDURE — 2580000003 HC RX 258: Performed by: INTERNAL MEDICINE

## 2020-04-17 PROCEDURE — 86900 BLOOD TYPING SEROLOGIC ABO: CPT

## 2020-04-17 PROCEDURE — 6360000002 HC RX W HCPCS: Performed by: STUDENT IN AN ORGANIZED HEALTH CARE EDUCATION/TRAINING PROGRAM

## 2020-04-17 PROCEDURE — 87186 SC STD MICRODIL/AGAR DIL: CPT

## 2020-04-17 PROCEDURE — 87077 CULTURE AEROBIC IDENTIFY: CPT

## 2020-04-17 PROCEDURE — 93010 ELECTROCARDIOGRAM REPORT: CPT | Performed by: INTERNAL MEDICINE

## 2020-04-17 PROCEDURE — 6370000000 HC RX 637 (ALT 250 FOR IP): Performed by: INTERNAL MEDICINE

## 2020-04-17 PROCEDURE — 2580000003 HC RX 258: Performed by: STUDENT IN AN ORGANIZED HEALTH CARE EDUCATION/TRAINING PROGRAM

## 2020-04-17 PROCEDURE — 2709999900 HC NON-CHARGEABLE SUPPLY: Performed by: SURGERY

## 2020-04-17 PROCEDURE — 94660 CPAP INITIATION&MGMT: CPT

## 2020-04-17 PROCEDURE — 2700000000 HC OXYGEN THERAPY PER DAY

## 2020-04-17 PROCEDURE — 6360000002 HC RX W HCPCS: Performed by: ANESTHESIOLOGY

## 2020-04-17 PROCEDURE — 87075 CULTR BACTERIA EXCEPT BLOOD: CPT

## 2020-04-17 PROCEDURE — 0JBC0ZZ EXCISION OF PELVIC REGION SUBCUTANEOUS TISSUE AND FASCIA, OPEN APPROACH: ICD-10-PCS | Performed by: SURGERY

## 2020-04-17 PROCEDURE — 3600000012 HC SURGERY LEVEL 2 ADDTL 15MIN: Performed by: SURGERY

## 2020-04-17 PROCEDURE — 2060000000 HC ICU INTERMEDIATE R&B

## 2020-04-17 PROCEDURE — 6360000002 HC RX W HCPCS: Performed by: NURSE ANESTHETIST, CERTIFIED REGISTERED

## 2020-04-17 RX ORDER — DEXAMETHASONE SODIUM PHOSPHATE 10 MG/ML
INJECTION INTRAMUSCULAR; INTRAVENOUS PRN
Status: DISCONTINUED | OUTPATIENT
Start: 2020-04-17 | End: 2020-04-17 | Stop reason: SDUPTHER

## 2020-04-17 RX ORDER — LIDOCAINE HYDROCHLORIDE 10 MG/ML
5 INJECTION, SOLUTION EPIDURAL; INFILTRATION; INTRACAUDAL; PERINEURAL ONCE
Status: COMPLETED | OUTPATIENT
Start: 2020-04-17 | End: 2020-04-18

## 2020-04-17 RX ORDER — GLYCOPYRROLATE 1 MG/5 ML
SYRINGE (ML) INTRAVENOUS PRN
Status: DISCONTINUED | OUTPATIENT
Start: 2020-04-17 | End: 2020-04-17 | Stop reason: SDUPTHER

## 2020-04-17 RX ORDER — MEPERIDINE HYDROCHLORIDE 25 MG/ML
12.5 INJECTION INTRAMUSCULAR; INTRAVENOUS; SUBCUTANEOUS EVERY 5 MIN PRN
Status: DISCONTINUED | OUTPATIENT
Start: 2020-04-17 | End: 2020-04-17 | Stop reason: HOSPADM

## 2020-04-17 RX ORDER — FENTANYL CITRATE 50 UG/ML
INJECTION, SOLUTION INTRAMUSCULAR; INTRAVENOUS PRN
Status: DISCONTINUED | OUTPATIENT
Start: 2020-04-17 | End: 2020-04-17 | Stop reason: SDUPTHER

## 2020-04-17 RX ORDER — PROPOFOL 10 MG/ML
INJECTION, EMULSION INTRAVENOUS PRN
Status: DISCONTINUED | OUTPATIENT
Start: 2020-04-17 | End: 2020-04-17 | Stop reason: SDUPTHER

## 2020-04-17 RX ORDER — SODIUM CHLORIDE 9 MG/ML
INJECTION, SOLUTION INTRAVENOUS CONTINUOUS PRN
Status: DISCONTINUED | OUTPATIENT
Start: 2020-04-17 | End: 2020-04-17 | Stop reason: SDUPTHER

## 2020-04-17 RX ORDER — SODIUM HYPOCHLORITE 5 MG/ML
SOLUTION TOPICAL PRN
Status: DISCONTINUED | OUTPATIENT
Start: 2020-04-17 | End: 2020-04-17 | Stop reason: HOSPADM

## 2020-04-17 RX ORDER — HYDRALAZINE HYDROCHLORIDE 20 MG/ML
5 INJECTION INTRAMUSCULAR; INTRAVENOUS EVERY 10 MIN PRN
Status: DISCONTINUED | OUTPATIENT
Start: 2020-04-17 | End: 2020-04-17 | Stop reason: HOSPADM

## 2020-04-17 RX ORDER — ONDANSETRON 2 MG/ML
INJECTION INTRAMUSCULAR; INTRAVENOUS PRN
Status: DISCONTINUED | OUTPATIENT
Start: 2020-04-17 | End: 2020-04-17 | Stop reason: SDUPTHER

## 2020-04-17 RX ORDER — SODIUM CHLORIDE 0.9 % (FLUSH) 0.9 %
10 SYRINGE (ML) INJECTION EVERY 12 HOURS SCHEDULED
Status: DISCONTINUED | OUTPATIENT
Start: 2020-04-17 | End: 2020-05-04 | Stop reason: HOSPADM

## 2020-04-17 RX ORDER — LIDOCAINE HYDROCHLORIDE 20 MG/ML
INJECTION, SOLUTION INTRAVENOUS PRN
Status: DISCONTINUED | OUTPATIENT
Start: 2020-04-17 | End: 2020-04-17 | Stop reason: SDUPTHER

## 2020-04-17 RX ORDER — PROMETHAZINE HYDROCHLORIDE 25 MG/ML
6.25 INJECTION, SOLUTION INTRAMUSCULAR; INTRAVENOUS
Status: DISCONTINUED | OUTPATIENT
Start: 2020-04-17 | End: 2020-04-17 | Stop reason: HOSPADM

## 2020-04-17 RX ORDER — NEOSTIGMINE METHYLSULFATE 1 MG/ML
INJECTION, SOLUTION INTRAVENOUS PRN
Status: DISCONTINUED | OUTPATIENT
Start: 2020-04-17 | End: 2020-04-17 | Stop reason: SDUPTHER

## 2020-04-17 RX ORDER — LABETALOL HYDROCHLORIDE 5 MG/ML
5 INJECTION, SOLUTION INTRAVENOUS EVERY 10 MIN PRN
Status: DISCONTINUED | OUTPATIENT
Start: 2020-04-17 | End: 2020-04-17 | Stop reason: HOSPADM

## 2020-04-17 RX ORDER — SODIUM CHLORIDE 0.9 % (FLUSH) 0.9 %
10 SYRINGE (ML) INJECTION PRN
Status: DISCONTINUED | OUTPATIENT
Start: 2020-04-17 | End: 2020-05-04 | Stop reason: HOSPADM

## 2020-04-17 RX ORDER — ROCURONIUM BROMIDE 10 MG/ML
INJECTION, SOLUTION INTRAVENOUS PRN
Status: DISCONTINUED | OUTPATIENT
Start: 2020-04-17 | End: 2020-04-17 | Stop reason: SDUPTHER

## 2020-04-17 RX ORDER — MIDAZOLAM HYDROCHLORIDE 1 MG/ML
INJECTION INTRAMUSCULAR; INTRAVENOUS PRN
Status: DISCONTINUED | OUTPATIENT
Start: 2020-04-17 | End: 2020-04-17 | Stop reason: SDUPTHER

## 2020-04-17 RX ORDER — SUCCINYLCHOLINE/SOD CL,ISO/PF 200MG/10ML
SYRINGE (ML) INTRAVENOUS PRN
Status: DISCONTINUED | OUTPATIENT
Start: 2020-04-17 | End: 2020-04-17 | Stop reason: SDUPTHER

## 2020-04-17 RX ADMIN — Medication 0.4 MG: at 09:46

## 2020-04-17 RX ADMIN — DOCUSATE SODIUM 100 MG: 100 CAPSULE, LIQUID FILLED ORAL at 20:54

## 2020-04-17 RX ADMIN — PIPERACILLIN AND TAZOBACTAM 3.38 G: 3; .375 INJECTION, POWDER, FOR SOLUTION INTRAVENOUS at 00:36

## 2020-04-17 RX ADMIN — DOCUSATE SODIUM 100 MG: 100 CAPSULE, LIQUID FILLED ORAL at 13:25

## 2020-04-17 RX ADMIN — SPIRONOLACTONE 50 MG: 25 TABLET ORAL at 13:18

## 2020-04-17 RX ADMIN — SODIUM CHLORIDE: 9 INJECTION, SOLUTION INTRAVENOUS at 13:32

## 2020-04-17 RX ADMIN — CLONIDINE HYDROCHLORIDE 0.1 MG: 0.1 TABLET ORAL at 13:18

## 2020-04-17 RX ADMIN — FENTANYL CITRATE 50 MCG: 50 INJECTION, SOLUTION INTRAMUSCULAR; INTRAVENOUS at 10:01

## 2020-04-17 RX ADMIN — VANCOMYCIN HYDROCHLORIDE 1750 MG: 10 INJECTION, POWDER, LYOPHILIZED, FOR SOLUTION INTRAVENOUS at 05:16

## 2020-04-17 RX ADMIN — ENOXAPARIN SODIUM 40 MG: 100 INJECTION SUBCUTANEOUS at 13:10

## 2020-04-17 RX ADMIN — TAMSULOSIN HYDROCHLORIDE 0.4 MG: 0.4 CAPSULE ORAL at 13:26

## 2020-04-17 RX ADMIN — LIDOCAINE HYDROCHLORIDE 100 MG: 20 INJECTION, SOLUTION INTRAVENOUS at 08:51

## 2020-04-17 RX ADMIN — INSULIN LISPRO 3 UNITS: 100 INJECTION, SOLUTION INTRAVENOUS; SUBCUTANEOUS at 16:59

## 2020-04-17 RX ADMIN — CLONIDINE HYDROCHLORIDE 0.1 MG: 0.1 TABLET ORAL at 20:54

## 2020-04-17 RX ADMIN — CILOSTAZOL 100 MG: 100 TABLET ORAL at 20:54

## 2020-04-17 RX ADMIN — MICONAZOLE NITRATE: 20.6 POWDER TOPICAL at 20:56

## 2020-04-17 RX ADMIN — HYDROMORPHONE HYDROCHLORIDE 0.5 MG: 1 INJECTION, SOLUTION INTRAMUSCULAR; INTRAVENOUS; SUBCUTANEOUS at 10:34

## 2020-04-17 RX ADMIN — INSULIN GLARGINE 15 UNITS: 100 INJECTION, SOLUTION SUBCUTANEOUS at 13:16

## 2020-04-17 RX ADMIN — STANDARDIZED SENNA CONCENTRATE 8.6 MG: 8.6 TABLET ORAL at 13:20

## 2020-04-17 RX ADMIN — INSULIN GLARGINE 15 UNITS: 100 INJECTION, SOLUTION SUBCUTANEOUS at 20:56

## 2020-04-17 RX ADMIN — FERROUS SULFATE TAB 325 MG (65 MG ELEMENTAL FE) 325 MG: 325 (65 FE) TAB at 16:56

## 2020-04-17 RX ADMIN — CEFEPIME HYDROCHLORIDE 2 G: 2 INJECTION, POWDER, FOR SOLUTION INTRAVENOUS at 16:56

## 2020-04-17 RX ADMIN — LUBIPROSTONE 24 MCG: 24 CAPSULE, GELATIN COATED ORAL at 13:26

## 2020-04-17 RX ADMIN — PIPERACILLIN AND TAZOBACTAM 3.38 G: 3; .375 INJECTION, POWDER, FOR SOLUTION INTRAVENOUS at 08:55

## 2020-04-17 RX ADMIN — LISINOPRIL 10 MG: 20 TABLET ORAL at 13:18

## 2020-04-17 RX ADMIN — ASPIRIN 81 MG 81 MG: 81 TABLET ORAL at 13:26

## 2020-04-17 RX ADMIN — LUBIPROSTONE 24 MCG: 24 CAPSULE, GELATIN COATED ORAL at 16:56

## 2020-04-17 RX ADMIN — METOPROLOL TARTRATE 25 MG: 25 TABLET, FILM COATED ORAL at 20:54

## 2020-04-17 RX ADMIN — FERROUS SULFATE TAB 325 MG (65 MG ELEMENTAL FE) 325 MG: 325 (65 FE) TAB at 13:26

## 2020-04-17 RX ADMIN — ONDANSETRON HYDROCHLORIDE 4 MG: 2 INJECTION, SOLUTION INTRAMUSCULAR; INTRAVENOUS at 09:39

## 2020-04-17 RX ADMIN — PREGABALIN 200 MG: 100 CAPSULE ORAL at 20:54

## 2020-04-17 RX ADMIN — SODIUM CHLORIDE, PRESERVATIVE FREE 10 ML: 5 INJECTION INTRAVENOUS at 13:28

## 2020-04-17 RX ADMIN — Medication 160 MG: at 08:51

## 2020-04-17 RX ADMIN — HYDROCODONE BITARTRATE AND ACETAMINOPHEN 1 TABLET: 5; 325 TABLET ORAL at 23:35

## 2020-04-17 RX ADMIN — MIDAZOLAM 2 MG: 1 INJECTION INTRAMUSCULAR; INTRAVENOUS at 08:48

## 2020-04-17 RX ADMIN — HYDROCODONE BITARTRATE AND ACETAMINOPHEN 1 TABLET: 5; 325 TABLET ORAL at 14:24

## 2020-04-17 RX ADMIN — PANTOPRAZOLE SODIUM 40 MG: 40 TABLET, DELAYED RELEASE ORAL at 13:26

## 2020-04-17 RX ADMIN — DULOXETINE HYDROCHLORIDE 120 MG: 60 CAPSULE, DELAYED RELEASE ORAL at 13:21

## 2020-04-17 RX ADMIN — PREGABALIN 200 MG: 100 CAPSULE ORAL at 13:33

## 2020-04-17 RX ADMIN — ROCURONIUM BROMIDE 30 MG: 10 INJECTION, SOLUTION INTRAVENOUS at 09:06

## 2020-04-17 RX ADMIN — DAPTOMYCIN 650 MG: 500 INJECTION, POWDER, LYOPHILIZED, FOR SOLUTION INTRAVENOUS at 15:16

## 2020-04-17 RX ADMIN — ATORVASTATIN CALCIUM 10 MG: 10 TABLET, FILM COATED ORAL at 13:21

## 2020-04-17 RX ADMIN — AMLODIPINE BESYLATE 5 MG: 5 TABLET ORAL at 13:20

## 2020-04-17 RX ADMIN — PROPOFOL 200 MG: 10 INJECTION, EMULSION INTRAVENOUS at 08:51

## 2020-04-17 RX ADMIN — FENTANYL CITRATE 50 MCG: 50 INJECTION, SOLUTION INTRAMUSCULAR; INTRAVENOUS at 09:08

## 2020-04-17 RX ADMIN — INSULIN LISPRO 1 UNITS: 100 INJECTION, SOLUTION INTRAVENOUS; SUBCUTANEOUS at 13:17

## 2020-04-17 RX ADMIN — Medication 3 MG: at 09:46

## 2020-04-17 RX ADMIN — INSULIN LISPRO 1 UNITS: 100 INJECTION, SOLUTION INTRAVENOUS; SUBCUTANEOUS at 20:56

## 2020-04-17 RX ADMIN — METOPROLOL TARTRATE 25 MG: 25 TABLET, FILM COATED ORAL at 13:20

## 2020-04-17 RX ADMIN — CILOSTAZOL 100 MG: 100 TABLET ORAL at 13:18

## 2020-04-17 RX ADMIN — FENTANYL CITRATE 150 MCG: 50 INJECTION, SOLUTION INTRAMUSCULAR; INTRAVENOUS at 08:51

## 2020-04-17 RX ADMIN — HYDROCODONE BITARTRATE AND ACETAMINOPHEN 1 TABLET: 5; 325 TABLET ORAL at 00:41

## 2020-04-17 RX ADMIN — DEXAMETHASONE SODIUM PHOSPHATE 10 MG: 10 INJECTION INTRAMUSCULAR; INTRAVENOUS at 09:06

## 2020-04-17 RX ADMIN — SODIUM CHLORIDE: 9 INJECTION, SOLUTION INTRAVENOUS at 08:18

## 2020-04-17 ASSESSMENT — PULMONARY FUNCTION TESTS
PIF_VALUE: 41
PIF_VALUE: 27
PIF_VALUE: 2
PIF_VALUE: 27
PIF_VALUE: 26
PIF_VALUE: 25
PIF_VALUE: 1
PIF_VALUE: 19
PIF_VALUE: 26
PIF_VALUE: 24
PIF_VALUE: 12
PIF_VALUE: 3
PIF_VALUE: 25
PIF_VALUE: 27
PIF_VALUE: 11
PIF_VALUE: 27
PIF_VALUE: 27
PIF_VALUE: 12
PIF_VALUE: 27
PIF_VALUE: 0
PIF_VALUE: 25
PIF_VALUE: 1
PIF_VALUE: 27
PIF_VALUE: 24
PIF_VALUE: 27
PIF_VALUE: 26
PIF_VALUE: 24
PIF_VALUE: 1
PIF_VALUE: 1
PIF_VALUE: 26
PIF_VALUE: 27
PIF_VALUE: 27
PIF_VALUE: 3
PIF_VALUE: 2
PIF_VALUE: 27
PIF_VALUE: 27
PIF_VALUE: 26
PIF_VALUE: 0
PIF_VALUE: 27
PIF_VALUE: 28
PIF_VALUE: 23
PIF_VALUE: 27
PIF_VALUE: 26
PIF_VALUE: 1
PIF_VALUE: 27
PIF_VALUE: 22
PIF_VALUE: 2
PIF_VALUE: 27
PIF_VALUE: 27
PIF_VALUE: 25
PIF_VALUE: 27
PIF_VALUE: 2
PIF_VALUE: 25
PIF_VALUE: 26
PIF_VALUE: 1
PIF_VALUE: 27
PIF_VALUE: 24
PIF_VALUE: 3
PIF_VALUE: 26
PIF_VALUE: 27
PIF_VALUE: 12
PIF_VALUE: 30
PIF_VALUE: 8

## 2020-04-17 ASSESSMENT — PAIN DESCRIPTION - DESCRIPTORS
DESCRIPTORS: ACHING;BURNING
DESCRIPTORS: DISCOMFORT;ACHING
DESCRIPTORS: ACHING;BURNING
DESCRIPTORS: ACHING;DISCOMFORT

## 2020-04-17 ASSESSMENT — PAIN DESCRIPTION - ORIENTATION
ORIENTATION: RIGHT

## 2020-04-17 ASSESSMENT — PAIN DESCRIPTION - LOCATION
LOCATION: LEG

## 2020-04-17 ASSESSMENT — PAIN DESCRIPTION - FREQUENCY
FREQUENCY: CONTINUOUS

## 2020-04-17 ASSESSMENT — PAIN SCALES - GENERAL
PAINLEVEL_OUTOF10: 6
PAINLEVEL_OUTOF10: 4
PAINLEVEL_OUTOF10: 5
PAINLEVEL_OUTOF10: 4
PAINLEVEL_OUTOF10: 8
PAINLEVEL_OUTOF10: 7
PAINLEVEL_OUTOF10: 0
PAINLEVEL_OUTOF10: 7
PAINLEVEL_OUTOF10: 8

## 2020-04-17 ASSESSMENT — PAIN DESCRIPTION - PAIN TYPE
TYPE: SURGICAL PAIN
TYPE: ACUTE PAIN
TYPE: SURGICAL PAIN

## 2020-04-17 ASSESSMENT — ENCOUNTER SYMPTOMS: SHORTNESS OF BREATH: 0

## 2020-04-17 ASSESSMENT — LIFESTYLE VARIABLES: SMOKING_STATUS: 1

## 2020-04-17 NOTE — CONSULTS
72 hours. No results for input(s): ASO in the last 72 hours. No results for input(s): CULTRESP in the last 72 hours. Assessment:  · Ischemic right AKA with necrosis of the skin and deep subcutaneous abscess and myositis with poor bleeding    Plan:    · Cont Dapto and cefepime  · May need disarticulation  · Check cultures  · Baseline ESR, CRP  · Monitor labs  · Will follow with you    Thank you for having us see this patient in consultation. I will be discussing this case with the treating physicians.       Electronically signed by Yayo Johnson MD on 4/17/2020 at 2:28 PM

## 2020-04-17 NOTE — ANESTHESIA PRE PROCEDURE
Eliud,    cloNIDine (CATAPRES) 0.1 MG tablet TAKE 1 TABLET BY MOUTH 3 TIMES A DAY 2/4/20  Yes Juanito Kline, DO   LANTUS 100 UNIT/ML injection vial INJECT 15 UNITS INTO THE SKIN TWO TIMES A DAY 2/4/20  Yes Juanito Kline, DO   lubiprostone (AMITIZA) 24 MCG capsule Take 1 capsule by mouth 2 times daily (with meals) 1/20/20  Yes Juanito Kline DO   metoprolol (LOPRESSOR) 100 MG tablet TAKE 1 TABLET BY MOUTH 2 TIMES A DAY 1/8/20  Yes Junaito Kline, DO   insulin lispro (HUMALOG) 100 UNIT/ML injection vial Inject 5 Units into the skin 3 times daily (with meals) 11/11/19  Yes Juanito Kline, DO   ferrous sulfate 325 (65 Fe) MG tablet Take 1 tablet by mouth 2 times daily (with meals) 11/14/18  Yes Daniel Vu MD   aspirin 81 MG chewable tablet Take 1 tablet by mouth daily 8/8/18  Yes Devang Green MD   lisinopril (PRINIVIL;ZESTRIL) 10 MG tablet Take 1 tablet by mouth daily Hold if sbp<140 8/7/18  Yes Devang Green MD   cilostazol (PLETAL) 100 MG tablet Take 100 mg by mouth 2 times daily   Yes Historical Provider, MD   pregabalin (LYRICA) 100 MG capsule Take 100 mg by mouth 2 times daily.     Yes Historical Provider, MD   spironolactone (ALDACTONE) 50 MG tablet Take 50 mg by mouth every morning    Yes Historical Provider, MD   atorvastatin (LIPITOR) 10 MG tablet Take 10 mg by mouth daily    Yes Historical Provider, MD   esomeprazole (NEXIUM) 40 MG delayed release capsule Take 40 mg by mouth every morning (before breakfast)   Yes Historical Provider, MD   ONE TOUCH ULTRA TEST strip USE TO TEST BLOOD SUGARS DAILY AS NEEDED 3/9/20   Juanito Kline, DO   Handicap Placard MISC by Does not apply route Patient cannot walk 200 ft without stopping to rest.    Expiration 10/21/2024 10/21/19   Juanito Kline DO   LANCETS MICRO THIN 92J MISC 1 applicator by Does not apply route daily 6/14/19   Juanito Kline, DO   Insulin Syringe-Needle U-100 30G X 5/16\" 0.3 ML MISC 1 box by Does not apply route 5 times daily 5/30/19 electrolyte abnormalities, . ROS comment: Hx diabetic ulcer right midfoot   Hx OM Abdominal:   (+) obese,         Vascular:   + PVD, aortic or cerebral (Hx PVD), DVT (>5 years ago per pt), .        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 Anesthesia Plan      general     ASA 4     (  )  Induction: intravenous. BIS  MIPS: Postoperative opioids intended, Prophylactic antiemetics administered and Postoperative trial extubation. Anesthetic plan and risks discussed with patient. Use of blood products discussed with patient whom did not consent to blood products. Plan discussed with attending and CRNA. DOS STAFF ADDENDUM:    Patient seen and chart reviewed. Physical exam and history updated as indicated. NPO status confirmed. Anesthesia options and plan discussed including risks benefits with patient/legal guardian and family as available. Concerns and questions addressed. Consent verbalized to proceed.   Anesthesia plan, options and intraoperative/postoperative concerns discussed with care team.    Hao Gonzáles MD  4/17/2020  7:28 AM

## 2020-04-17 NOTE — CARE COORDINATION
Pt currently off unit for procedure. I spoke with Maggy Lien liaison with [facility], they will accept back, not a bed-hold, will need therapies documented to return, order obtained from attending. I attempted to call pt's spouse Kathi Giles however, could not verify this is actually his VM, I also texted contact to confirm, await response. No phone available for pt's sister Yamile Flores. Tentative discharge plan is to return to [facility]. Envelope, and ambulance form completed in soft-chart. The Plan for Transition of Care is related to the following treatment goals: medical stability    The Patient and/or patient representative was provided with a choice of provider and agrees   with the discharge plan. [x] Yes [] No    Freedom of choice list was provided with basic dialogue that supports the patient's individualized plan of care/goals, treatment preferences and shares the quality data associated with the providers.  [x] Yes [] No

## 2020-04-17 NOTE — PROGRESS NOTES
Pharmacy Consultation Note  (Antibiotic Dosing and Monitoring)    Initial consult date: 2020  Consulting physician: Dr. Lauren Newell  Drug(s): vancomycin   Indication: CSSSI; surgical site wound infection  Age/Gender IBW DW  Allergy Information   64 y.o./M; 188 cm, 152 kg 54.6 kg 93.6 kg  Patient has no known allergies. Date  WBC BUN/CR Drug/Dose Time   Given Level(s)   (Time) Comments    13.5 19/1.3 vancomycin 3000 mg x1 1710      12.2 16/1.3 vancomycin 1750 mg q12h 05  OR for I&D/ +/- revision      vancomycin 2000 mg q24h <0800>                Estimated Creatinine Clearance: 92 mL/min (A) (based on SCr of 1.3 mg/dL (H)). Intake/Output Summary (Last 24 hours) at 2020 0741  Last data filed at 2020 3278  Gross per 24 hour   Intake 420 ml   Output 0 ml   Net 420 ml       Temp max: Temp (24hrs), Av.7 °F (37.1 °C), Min:96.9 °F (36.1 °C), Max:100.2 °F (37.9 °C)      Cultures:  available culture and sensitivity results were reviewed in EPIC      Assessment:  · Consulted by Dr. Johanne Marion to dose/monitor vancomycin. · Goal trough level:  15-20 mCg/mL. · 65 yo/M admitted from NH for increased purulent drainage from R AKA stump, fever, V/D.    · S/P R AKA in mid-March for severe, acute RLE ischemia. · Treated with IV vanco IP (Pharmacy consulted to dose) and discharged on 3/27 with script for 14 days of PO linezolid per ID. · Vancomcyin and pip-tazo started in ED and continued on admission. Received vancomycin 3000 mg x1 on  @ 1710 and started vancomycin 1750 mg q12h today @ 0516. · Cr = 1.3 (baseline was 1.6-1.7 before R AKA in March). · ID has been consulted. Plan:  · Change vancomycin to 2000 mg q24h starting on  based on PK of previous admission. · Will check vancomycin trough level when steady state is attained if vanco continues and if Pharmacy is to continue the consult. · Pharmacist will follow and monitor/adjust dosing as necessary.     Flavio Burns,

## 2020-04-18 LAB
GRAM STAIN ORDERABLE: NORMAL
GRAM STAIN ORDERABLE: NORMAL
MAGNESIUM: 1.8 MG/DL (ref 1.6–2.6)
METER GLUCOSE: 148 MG/DL (ref 74–99)
METER GLUCOSE: 164 MG/DL (ref 74–99)
METER GLUCOSE: 170 MG/DL (ref 74–99)
METER GLUCOSE: 179 MG/DL (ref 74–99)
METER GLUCOSE: 192 MG/DL (ref 74–99)
PHOSPHORUS: 2.8 MG/DL (ref 2.5–4.5)
PROCALCITONIN: 0.2 NG/ML (ref 0–0.08)

## 2020-04-18 PROCEDURE — 6360000002 HC RX W HCPCS: Performed by: STUDENT IN AN ORGANIZED HEALTH CARE EDUCATION/TRAINING PROGRAM

## 2020-04-18 PROCEDURE — 84145 PROCALCITONIN (PCT): CPT

## 2020-04-18 PROCEDURE — 2580000003 HC RX 258: Performed by: SPECIALIST

## 2020-04-18 PROCEDURE — 2700000000 HC OXYGEN THERAPY PER DAY

## 2020-04-18 PROCEDURE — 6370000000 HC RX 637 (ALT 250 FOR IP): Performed by: STUDENT IN AN ORGANIZED HEALTH CARE EDUCATION/TRAINING PROGRAM

## 2020-04-18 PROCEDURE — 84100 ASSAY OF PHOSPHORUS: CPT

## 2020-04-18 PROCEDURE — 2060000000 HC ICU INTERMEDIATE R&B

## 2020-04-18 PROCEDURE — 36415 COLL VENOUS BLD VENIPUNCTURE: CPT

## 2020-04-18 PROCEDURE — 02HV33Z INSERTION OF INFUSION DEVICE INTO SUPERIOR VENA CAVA, PERCUTANEOUS APPROACH: ICD-10-PCS | Performed by: INTERNAL MEDICINE

## 2020-04-18 PROCEDURE — C1751 CATH, INF, PER/CENT/MIDLINE: HCPCS

## 2020-04-18 PROCEDURE — 99024 POSTOP FOLLOW-UP VISIT: CPT | Performed by: SURGERY

## 2020-04-18 PROCEDURE — 6360000002 HC RX W HCPCS: Performed by: SPECIALIST

## 2020-04-18 PROCEDURE — 2580000003 HC RX 258: Performed by: STUDENT IN AN ORGANIZED HEALTH CARE EDUCATION/TRAINING PROGRAM

## 2020-04-18 PROCEDURE — 94660 CPAP INITIATION&MGMT: CPT

## 2020-04-18 PROCEDURE — 82962 GLUCOSE BLOOD TEST: CPT

## 2020-04-18 PROCEDURE — 2500000003 HC RX 250 WO HCPCS: Performed by: SPECIALIST

## 2020-04-18 PROCEDURE — 83735 ASSAY OF MAGNESIUM: CPT

## 2020-04-18 PROCEDURE — 76937 US GUIDE VASCULAR ACCESS: CPT

## 2020-04-18 RX ORDER — SODIUM HYPOCHLORITE 1.25 MG/ML
SOLUTION TOPICAL DAILY
Status: DISCONTINUED | OUTPATIENT
Start: 2020-04-18 | End: 2020-04-18

## 2020-04-18 RX ORDER — SODIUM HYPOCHLORITE 1.25 MG/ML
SOLUTION TOPICAL DAILY
Status: DISCONTINUED | OUTPATIENT
Start: 2020-04-18 | End: 2020-04-21 | Stop reason: ALTCHOICE

## 2020-04-18 RX ADMIN — CLONIDINE HYDROCHLORIDE 0.1 MG: 0.1 TABLET ORAL at 20:43

## 2020-04-18 RX ADMIN — LISINOPRIL 10 MG: 20 TABLET ORAL at 09:46

## 2020-04-18 RX ADMIN — FERROUS SULFATE TAB 325 MG (65 MG ELEMENTAL FE) 325 MG: 325 (65 FE) TAB at 09:46

## 2020-04-18 RX ADMIN — DULOXETINE HYDROCHLORIDE 120 MG: 60 CAPSULE, DELAYED RELEASE ORAL at 09:46

## 2020-04-18 RX ADMIN — PANTOPRAZOLE SODIUM 40 MG: 40 TABLET, DELAYED RELEASE ORAL at 06:22

## 2020-04-18 RX ADMIN — METOPROLOL TARTRATE 25 MG: 25 TABLET, FILM COATED ORAL at 20:43

## 2020-04-18 RX ADMIN — LIDOCAINE HYDROCHLORIDE 5 ML: 10 INJECTION, SOLUTION EPIDURAL; INFILTRATION; INTRACAUDAL; PERINEURAL at 10:11

## 2020-04-18 RX ADMIN — DAPTOMYCIN 650 MG: 500 INJECTION, POWDER, LYOPHILIZED, FOR SOLUTION INTRAVENOUS at 15:47

## 2020-04-18 RX ADMIN — ASPIRIN 81 MG 81 MG: 81 TABLET ORAL at 09:50

## 2020-04-18 RX ADMIN — PREGABALIN 200 MG: 100 CAPSULE ORAL at 20:43

## 2020-04-18 RX ADMIN — CEFEPIME HYDROCHLORIDE 2 G: 2 INJECTION, POWDER, FOR SOLUTION INTRAVENOUS at 02:37

## 2020-04-18 RX ADMIN — HYDROCODONE BITARTRATE AND ACETAMINOPHEN 1 TABLET: 5; 325 TABLET ORAL at 05:38

## 2020-04-18 RX ADMIN — DOCUSATE SODIUM 100 MG: 100 CAPSULE, LIQUID FILLED ORAL at 20:43

## 2020-04-18 RX ADMIN — INSULIN LISPRO 1 UNITS: 100 INJECTION, SOLUTION INTRAVENOUS; SUBCUTANEOUS at 12:39

## 2020-04-18 RX ADMIN — CLONIDINE HYDROCHLORIDE 0.1 MG: 0.1 TABLET ORAL at 09:47

## 2020-04-18 RX ADMIN — PREGABALIN 200 MG: 100 CAPSULE ORAL at 15:47

## 2020-04-18 RX ADMIN — FERROUS SULFATE TAB 325 MG (65 MG ELEMENTAL FE) 325 MG: 325 (65 FE) TAB at 16:33

## 2020-04-18 RX ADMIN — METOPROLOL TARTRATE 25 MG: 25 TABLET, FILM COATED ORAL at 09:47

## 2020-04-18 RX ADMIN — Medication 10 ML: at 20:51

## 2020-04-18 RX ADMIN — HYDROCODONE BITARTRATE AND ACETAMINOPHEN 1 TABLET: 5; 325 TABLET ORAL at 16:35

## 2020-04-18 RX ADMIN — INSULIN LISPRO 1 UNITS: 100 INJECTION, SOLUTION INTRAVENOUS; SUBCUTANEOUS at 20:45

## 2020-04-18 RX ADMIN — AMLODIPINE BESYLATE 5 MG: 5 TABLET ORAL at 09:46

## 2020-04-18 RX ADMIN — PREGABALIN 200 MG: 100 CAPSULE ORAL at 09:47

## 2020-04-18 RX ADMIN — HYDROCODONE BITARTRATE AND ACETAMINOPHEN 1 TABLET: 5; 325 TABLET ORAL at 20:48

## 2020-04-18 RX ADMIN — SODIUM CHLORIDE: 9 INJECTION, SOLUTION INTRAVENOUS at 09:46

## 2020-04-18 RX ADMIN — LUBIPROSTONE 24 MCG: 24 CAPSULE, GELATIN COATED ORAL at 09:47

## 2020-04-18 RX ADMIN — Medication: at 06:39

## 2020-04-18 RX ADMIN — INSULIN LISPRO 1 UNITS: 100 INJECTION, SOLUTION INTRAVENOUS; SUBCUTANEOUS at 16:33

## 2020-04-18 RX ADMIN — Medication 10 ML: at 09:47

## 2020-04-18 RX ADMIN — INSULIN GLARGINE 15 UNITS: 100 INJECTION, SOLUTION SUBCUTANEOUS at 09:52

## 2020-04-18 RX ADMIN — SODIUM CHLORIDE 25 ML: 9 INJECTION, SOLUTION INTRAVENOUS at 20:50

## 2020-04-18 RX ADMIN — LUBIPROSTONE 24 MCG: 24 CAPSULE, GELATIN COATED ORAL at 16:33

## 2020-04-18 RX ADMIN — TAMSULOSIN HYDROCHLORIDE 0.4 MG: 0.4 CAPSULE ORAL at 09:50

## 2020-04-18 RX ADMIN — STANDARDIZED SENNA CONCENTRATE 8.6 MG: 8.6 TABLET ORAL at 09:50

## 2020-04-18 RX ADMIN — CEFEPIME HYDROCHLORIDE 2 G: 2 INJECTION, POWDER, FOR SOLUTION INTRAVENOUS at 15:51

## 2020-04-18 RX ADMIN — ENOXAPARIN SODIUM 40 MG: 100 INJECTION SUBCUTANEOUS at 09:48

## 2020-04-18 RX ADMIN — MICONAZOLE NITRATE: 20.6 POWDER TOPICAL at 20:49

## 2020-04-18 RX ADMIN — CILOSTAZOL 100 MG: 100 TABLET ORAL at 09:46

## 2020-04-18 RX ADMIN — CILOSTAZOL 100 MG: 100 TABLET ORAL at 20:43

## 2020-04-18 RX ADMIN — INSULIN LISPRO 1 UNITS: 100 INJECTION, SOLUTION INTRAVENOUS; SUBCUTANEOUS at 06:22

## 2020-04-18 RX ADMIN — INSULIN GLARGINE 15 UNITS: 100 INJECTION, SOLUTION SUBCUTANEOUS at 20:45

## 2020-04-18 RX ADMIN — SPIRONOLACTONE 50 MG: 25 TABLET ORAL at 09:47

## 2020-04-18 RX ADMIN — DOCUSATE SODIUM 100 MG: 100 CAPSULE, LIQUID FILLED ORAL at 09:46

## 2020-04-18 RX ADMIN — ATORVASTATIN CALCIUM 10 MG: 10 TABLET, FILM COATED ORAL at 09:47

## 2020-04-18 ASSESSMENT — PAIN SCALES - GENERAL
PAINLEVEL_OUTOF10: 5
PAINLEVEL_OUTOF10: 7
PAINLEVEL_OUTOF10: 0
PAINLEVEL_OUTOF10: 6
PAINLEVEL_OUTOF10: 0

## 2020-04-18 NOTE — PROGRESS NOTES
Wt (!) 335 lb (152 kg)   SpO2 97%   BMI 43.01 kg/m²   Temp  Av.1 °F (36.7 °C)  Min: 97.6 °F (36.4 °C)  Max: 98.9 °F (37.2 °C)  Constitutional: The patient is awake, alert, and oriented. Skin: Warm and dry. No rashes were noted. pre op              Postop    HEENT: Round and reactive pupils. Moist mucous membranes. No ulcerations or thrush. Neck: Supple to movements. Chest: No use of accessory muscles to breathe. Symmetrical expansion. No wheezing, crackles or rhonchi. Cardiovascular: S1 and S2 are rhythmic and regular. No murmurs appreciated. Abdomen: Positive bowel sounds to auscultation. Benign to palpation. No masses felt. No hepatosplenomegaly. Genitourinary: Male  Extremities: No clubbing, no cyanosis, no edema.   AKA on the right with extensive exploration and debridement of the right thigh  Lines: peripheral    Laboratory and Tests Review:  Lab Results   Component Value Date    WBC 12.2 (H) 2020    WBC 13.5 (H) 2020    WBC 12.9 (H) 2020    HGB 9.2 (L) 2020    HCT 29.4 (L) 2020    MCV 84.0 2020     2020     Lab Results   Component Value Date    NEUTROABS 10.52 (H) 2020    NEUTROABS 19.26 (H) 2020    NEUTROABS 6.83 2018     No results found for: [facility]  Lab Results   Component Value Date    ALT 92 (H) 2020    AST 57 (H) 2020    ALKPHOS 149 (H) 2020    BILITOT 0.4 2020     Lab Results   Component Value Date     2020    K 5.3 2020    CL 97 2020    CO2 26 2020    BUN 16 2020    CREATININE 1.3 2020    CREATININE 1.3 2020    CREATININE 1.5 2020    GFRAA >60 2020    LABGLOM >60 2020    GLUCOSE 137 2020    PROT 7.0 2020    LABALBU 2.6 2020    CALCIUM 8.6 2020    BILITOT 0.4 2020    ALKPHOS 149 2020    AST 57 2020    ALT 92 2020     Lab Results   Component Value Date    CRP 10.6 (H) 10/24/2018 CRP 2.1 (H) 06/25/2018    CRP 7.4 (H) 06/11/2018     Lab Results   Component Value Date    SEDRATE 128 (H) 10/24/2018    SEDRATE 77 (H) 06/25/2018    SEDRATE 138 (H) 06/11/2018     Radiology:      Microbiology:   Lab Results   Component Value Date    BC 24 Hours- no growth 04/16/2020    BC 5 Days- no growth 03/20/2020    BC 5 Days- no growth 10/26/2018    ORG Gram negative edis 04/17/2020    ORG Staphylococcus hominis ssp hominis 03/20/2020    ORG Staphylococcus epidermidis 03/20/2020    ORG Staphylococcus epidermidis 03/20/2020     Lab Results   Component Value Date    BLOODCULT2 24 Hours- no growth 04/16/2020    BLOODCULT2 5 Days- no growth 03/20/2020    BLOODCULT2 5 Days- no growth 10/26/2018    ORG Gram negative edis 04/17/2020    ORG Staphylococcus hominis ssp hominis 03/20/2020    ORG Staphylococcus epidermidis 03/20/2020    ORG Staphylococcus epidermidis 03/20/2020     WOUND/ABSCESS   Date Value Ref Range Status   04/16/2020   Preliminary    Growth present, evaluating for:  Mixed Gram negative rods     05/22/2018 Physician requested workup (A)  Final   05/22/2018 Moderate growth  Final   05/22/2018 Heavy growth  Final   05/22/2018 Light growth  Final     No results found for: RESPSMEAR  No results found for: MPNEUMO, CLAMYDCU, LABLEGI, AFBCX, FUNGSM, LABFUNG  No results found for: CULTRESP  No results found for: CXCATHTIP  No results found for: BFCS  Culture Surgical   Date Value Ref Range Status   04/17/2020   Preliminary    Growth present, evaluating for:  Mixed Gram negative rods     04/17/2020   Preliminary    Heavy growth  Identification and sensitivity to follow     03/20/2020 (A)  Final    Mixed radha isolated. Further workup and sensitivity testing  is not routinely indicated and will not be performed.   Mixed radha isolated includes:  Mixed Coagulase negative Staph species  Mixed morphologies Corynebacteria  Physician requested workup     03/20/2020 Light growth  Final   03/20/2020 Light growth

## 2020-04-18 NOTE — H&P
Charlotte Cooper M.D. History and Physical      CHIEF COMPLAINT: Right thigh abscess/infection of stump    Reason for Admission: As above    History Obtained From: Patient/EMR    HISTORY OF PRESENT ILLNESS:      The patient is a 60 y.o. male of [facility] with significant past medical history of peripheral vascular disease status post right AKA remotely, recent intervention on 3/20/20 with right iliofemoral artery embolectomy and deep femoral artery embolectomy along with right scrotal abscess incision and drainage followed by on 4/17/2020- RIGHT LEG DEBRIDEMENT INCISION AND DRAINAGE 10 x 2 cm groin excision, 32 x 20 cm Right thigh incision, 20 cm2 x 32 excisional debridement for infected right thigh stump. He was discharged thereafter to a nursing facility. He returns now secondary to ongoing fevers at the nursing facility. He was tested for COVID which was negative. He was placed in isolation there. Secondary to purulent drainage of his right stump and patient not improving clinically, he was sent into the emergency department for admission. On my evaluation, he does not give a very clear history however chart has been extensively reviewed. He indicates he is feeling much better today and denies any complaints.   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  tiZANidine (ZANAFLEX) 4 MG tablet, Take 4 mg by mouth every 8 hours as needed     Allergies:  Patient has no known allergies. Social History:   TOBACCO:   reports that he has been smoking cigarettes. He has a 3.50 pack-year smoking history. He has never used smokeless tobacco.  ETOH:   reports no history of alcohol use. MARITAL STATUS:    OCCUPATION:      Family History:       Problem Relation Age of Onset    Cancer Mother     Diabetes Father     Heart Failure Father     Hypertension Father     Cancer Sister        REVIEW OF SYSTEMS:    General ROS: Fevers and chills  Hematological and Lymphatic ROS: negative  Endocrine ROS: negative  Respiratory ROS: no cough, shortness of breath, or wheezing  Cardiovascular ROS: no chest pain or dyspnea on exertion  Gastrointestinal ROS: no abdominal pain, change in bowel habits, or black or bloody stools  Genito-Urinary ROS: no dysuria, trouble voiding, or hematuria  Neurological ROS: no TIA or stroke symptoms  negative    Vitals:  /69   Pulse 71   Temp 97.6 °F (36.4 °C) (Temporal)   Resp 18   Ht 6' 2\" (1.88 m)   Wt (!) 335 lb (152 kg)   SpO2 97%   BMI 43.01 kg/m²     PHYSICAL EXAM:  General:  Awake, alert, oriented X 3. Well developed, obese, well groomed. No apparent distress. HEENT:  Normocephalic, atraumatic. Pupils equal, round, reactive to light. No scleral icterus. No conjunctival injection. Normal lips, teeth, and gums. No nasal discharge. Neck:  Supple  Heart:  RRR, no murmurs, gallops, rubs, carotid upstroke normal, no carotid bruits  Lungs:  CTA bilaterally, bilat symmetrical expansion, no wheeze, rales, or rhonchi  Abdomen:   Bowel sounds present, soft, nontender, no masses, no organomegaly, no peritoneal signs  Extremities: Right leg stump/AKA/covered with dressing, right upper extremity PICC line in place  Skin:  Warm and dry, no open lesions or rash  Neuro:  Cranial nerves 2-12 intact, no focal deficits  Breast: deferred  Rectal: deferred  Genitalia:  deferred      DATA:     Recent Labs     04/16/20  1501 04/17/20  0605   WBC 13.5* 12.2*   HGB 9.2* 9.2*    330     Recent Labs     04/16/20  1501 04/17/20  0605    132   K 4.4 5.3*   BUN 19 16   CREATININE 1.3* 1.3*     Recent Labs     04/16/20  1500 04/16/20  1501   PROT  --  7.0   INR 1.6  --      Recent Labs     04/16/20  1501   AST 57*   ALT 92*   ALKPHOS 149*   BILITOT 0.4     No results for input(s): BNP in the last 72 hours. No results for input(s): CKTOTAL, CKMB, CKMBINDEX, TROPONINI in the last 72 hours. ASSESSMENT:      Active Problems:    Wound infection    Postoperative wound infection  Resolved Problems:    * No resolved hospital problems.  *  Diabetes mellitus type 2  Peripheral vascular disease  BPH        PLAN:    Admit to telemetry for evaluation of right thigh stump abscess-unhealed  Broad-spectrum IV antibiotic therapy with daptomycin and cefepime   infectious disease evaluation  Vascular surgery to continue to follow as patient known to him from previous recent interventions  Resume home medications with appropriate dose adjustments  AC at bedtime blood sugar checks with insulin t sliding scale  Current BP meds for accelerated hypertension    DVT prophylaxis  PT OT  Discharge plan    Millie Hooks MD  4/18/2020  11:08 AM

## 2020-04-19 LAB
ANAEROBIC CULTURE: NORMAL
ANAEROBIC CULTURE: NORMAL
CULTURE SURGICAL: ABNORMAL
METER GLUCOSE: 124 MG/DL (ref 74–99)
METER GLUCOSE: 145 MG/DL (ref 74–99)
METER GLUCOSE: 145 MG/DL (ref 74–99)
METER GLUCOSE: 147 MG/DL (ref 74–99)
METER GLUCOSE: 149 MG/DL (ref 74–99)
ORGANISM: ABNORMAL

## 2020-04-19 PROCEDURE — 82962 GLUCOSE BLOOD TEST: CPT

## 2020-04-19 PROCEDURE — 6360000002 HC RX W HCPCS: Performed by: SPECIALIST

## 2020-04-19 PROCEDURE — 87088 URINE BACTERIA CULTURE: CPT

## 2020-04-19 PROCEDURE — 6370000000 HC RX 637 (ALT 250 FOR IP): Performed by: STUDENT IN AN ORGANIZED HEALTH CARE EDUCATION/TRAINING PROGRAM

## 2020-04-19 PROCEDURE — 2580000003 HC RX 258: Performed by: STUDENT IN AN ORGANIZED HEALTH CARE EDUCATION/TRAINING PROGRAM

## 2020-04-19 PROCEDURE — 94660 CPAP INITIATION&MGMT: CPT

## 2020-04-19 PROCEDURE — 6370000000 HC RX 637 (ALT 250 FOR IP): Performed by: SURGERY

## 2020-04-19 PROCEDURE — 2580000003 HC RX 258: Performed by: SPECIALIST

## 2020-04-19 PROCEDURE — 6360000002 HC RX W HCPCS: Performed by: STUDENT IN AN ORGANIZED HEALTH CARE EDUCATION/TRAINING PROGRAM

## 2020-04-19 PROCEDURE — 2060000000 HC ICU INTERMEDIATE R&B

## 2020-04-19 RX ORDER — OXYCODONE HYDROCHLORIDE AND ACETAMINOPHEN 5; 325 MG/1; MG/1
1 TABLET ORAL EVERY 4 HOURS PRN
Status: DISCONTINUED | OUTPATIENT
Start: 2020-04-19 | End: 2020-04-20

## 2020-04-19 RX ORDER — SODIUM CHLORIDE 9 MG/ML
INJECTION, SOLUTION INTRAVENOUS EVERY 8 HOURS
Status: DISCONTINUED | OUTPATIENT
Start: 2020-04-19 | End: 2020-05-01

## 2020-04-19 RX ADMIN — SODIUM CHLORIDE: 9 INJECTION, SOLUTION INTRAVENOUS at 15:21

## 2020-04-19 RX ADMIN — CILOSTAZOL 100 MG: 100 TABLET ORAL at 20:16

## 2020-04-19 RX ADMIN — ATORVASTATIN CALCIUM 10 MG: 10 TABLET, FILM COATED ORAL at 08:11

## 2020-04-19 RX ADMIN — INSULIN GLARGINE 15 UNITS: 100 INJECTION, SOLUTION SUBCUTANEOUS at 20:16

## 2020-04-19 RX ADMIN — METOPROLOL TARTRATE 25 MG: 25 TABLET, FILM COATED ORAL at 08:10

## 2020-04-19 RX ADMIN — CEFEPIME HYDROCHLORIDE 2 G: 2 INJECTION, POWDER, FOR SOLUTION INTRAVENOUS at 03:16

## 2020-04-19 RX ADMIN — TAMSULOSIN HYDROCHLORIDE 0.4 MG: 0.4 CAPSULE ORAL at 08:11

## 2020-04-19 RX ADMIN — DOCUSATE SODIUM 100 MG: 100 CAPSULE, LIQUID FILLED ORAL at 08:11

## 2020-04-19 RX ADMIN — CILOSTAZOL 100 MG: 100 TABLET ORAL at 08:11

## 2020-04-19 RX ADMIN — DULOXETINE HYDROCHLORIDE 120 MG: 60 CAPSULE, DELAYED RELEASE ORAL at 08:11

## 2020-04-19 RX ADMIN — MICONAZOLE NITRATE: 20.6 POWDER TOPICAL at 20:22

## 2020-04-19 RX ADMIN — INSULIN GLARGINE 15 UNITS: 100 INJECTION, SOLUTION SUBCUTANEOUS at 08:10

## 2020-04-19 RX ADMIN — ASPIRIN 81 MG 81 MG: 81 TABLET ORAL at 08:11

## 2020-04-19 RX ADMIN — SODIUM CHLORIDE: 9 INJECTION, SOLUTION INTRAVENOUS at 01:28

## 2020-04-19 RX ADMIN — SODIUM CHLORIDE: 9 INJECTION, SOLUTION INTRAVENOUS at 15:22

## 2020-04-19 RX ADMIN — PREGABALIN 200 MG: 100 CAPSULE ORAL at 08:11

## 2020-04-19 RX ADMIN — CLONIDINE HYDROCHLORIDE 0.1 MG: 0.1 TABLET ORAL at 20:24

## 2020-04-19 RX ADMIN — PANTOPRAZOLE SODIUM 40 MG: 40 TABLET, DELAYED RELEASE ORAL at 06:24

## 2020-04-19 RX ADMIN — DOCUSATE SODIUM 100 MG: 100 CAPSULE, LIQUID FILLED ORAL at 20:16

## 2020-04-19 RX ADMIN — LUBIPROSTONE 24 MCG: 24 CAPSULE, GELATIN COATED ORAL at 17:08

## 2020-04-19 RX ADMIN — INSULIN LISPRO 1 UNITS: 100 INJECTION, SOLUTION INTRAVENOUS; SUBCUTANEOUS at 06:24

## 2020-04-19 RX ADMIN — PREGABALIN 200 MG: 100 CAPSULE ORAL at 20:16

## 2020-04-19 RX ADMIN — OXYCODONE HYDROCHLORIDE AND ACETAMINOPHEN 1 TABLET: 5; 325 TABLET ORAL at 20:52

## 2020-04-19 RX ADMIN — HYDROCODONE BITARTRATE AND ACETAMINOPHEN 1 TABLET: 5; 325 TABLET ORAL at 17:08

## 2020-04-19 RX ADMIN — MICONAZOLE NITRATE: 20.6 POWDER TOPICAL at 08:18

## 2020-04-19 RX ADMIN — HYDROCODONE BITARTRATE AND ACETAMINOPHEN 1 TABLET: 5; 325 TABLET ORAL at 08:10

## 2020-04-19 RX ADMIN — CLONIDINE HYDROCHLORIDE 0.1 MG: 0.1 TABLET ORAL at 08:11

## 2020-04-19 RX ADMIN — ENOXAPARIN SODIUM 40 MG: 100 INJECTION SUBCUTANEOUS at 08:10

## 2020-04-19 RX ADMIN — AMLODIPINE BESYLATE 5 MG: 5 TABLET ORAL at 08:18

## 2020-04-19 RX ADMIN — INSULIN LISPRO 1 UNITS: 100 INJECTION, SOLUTION INTRAVENOUS; SUBCUTANEOUS at 12:21

## 2020-04-19 RX ADMIN — FERROUS SULFATE TAB 325 MG (65 MG ELEMENTAL FE) 325 MG: 325 (65 FE) TAB at 08:10

## 2020-04-19 RX ADMIN — Medication: at 08:18

## 2020-04-19 RX ADMIN — SPIRONOLACTONE 50 MG: 25 TABLET ORAL at 08:11

## 2020-04-19 RX ADMIN — STANDARDIZED SENNA CONCENTRATE 8.6 MG: 8.6 TABLET ORAL at 08:11

## 2020-04-19 RX ADMIN — FERROUS SULFATE TAB 325 MG (65 MG ELEMENTAL FE) 325 MG: 325 (65 FE) TAB at 17:08

## 2020-04-19 RX ADMIN — INSULIN LISPRO 1 UNITS: 100 INJECTION, SOLUTION INTRAVENOUS; SUBCUTANEOUS at 20:18

## 2020-04-19 RX ADMIN — LUBIPROSTONE 24 MCG: 24 CAPSULE, GELATIN COATED ORAL at 08:11

## 2020-04-19 RX ADMIN — LISINOPRIL 10 MG: 20 TABLET ORAL at 08:11

## 2020-04-19 RX ADMIN — HYDROCODONE BITARTRATE AND ACETAMINOPHEN 1 TABLET: 5; 325 TABLET ORAL at 12:21

## 2020-04-19 RX ADMIN — CEFEPIME 2 G: 2 INJECTION, POWDER, FOR SOLUTION INTRAVENOUS at 20:23

## 2020-04-19 RX ADMIN — METOPROLOL TARTRATE 25 MG: 25 TABLET, FILM COATED ORAL at 20:18

## 2020-04-19 RX ADMIN — CEFEPIME 2 G: 2 INJECTION, POWDER, FOR SOLUTION INTRAVENOUS at 12:26

## 2020-04-19 ASSESSMENT — PAIN DESCRIPTION - ORIENTATION: ORIENTATION: RIGHT

## 2020-04-19 ASSESSMENT — PAIN SCALES - GENERAL
PAINLEVEL_OUTOF10: 7
PAINLEVEL_OUTOF10: 6
PAINLEVEL_OUTOF10: 4
PAINLEVEL_OUTOF10: 5
PAINLEVEL_OUTOF10: 8
PAINLEVEL_OUTOF10: 10

## 2020-04-19 ASSESSMENT — PAIN DESCRIPTION - ONSET: ONSET: ON-GOING

## 2020-04-19 ASSESSMENT — PAIN DESCRIPTION - PROGRESSION: CLINICAL_PROGRESSION: GRADUALLY WORSENING

## 2020-04-19 ASSESSMENT — PAIN DESCRIPTION - LOCATION: LOCATION: LEG

## 2020-04-19 ASSESSMENT — PAIN DESCRIPTION - DESCRIPTORS: DESCRIPTORS: ACHING;CONSTANT;DISCOMFORT

## 2020-04-19 ASSESSMENT — PAIN DESCRIPTION - PAIN TYPE: TYPE: ACUTE PAIN;SURGICAL PAIN

## 2020-04-19 ASSESSMENT — PAIN DESCRIPTION - FREQUENCY: FREQUENCY: CONTINUOUS

## 2020-04-19 ASSESSMENT — PAIN - FUNCTIONAL ASSESSMENT: PAIN_FUNCTIONAL_ASSESSMENT: PREVENTS OR INTERFERES SOME ACTIVE ACTIVITIES AND ADLS

## 2020-04-19 NOTE — PLAN OF CARE
Problem: Pain:  Goal: Pain level will decrease  Description: Pain level will decrease  4/19/2020 0122 by Kaz Lees RN  Outcome: Met This Shift     Problem: Pain:  Goal: Control of acute pain  Description: Control of acute pain  4/19/2020 0122 by Kaz Lees RN  Outcome: Met This Shift     Problem: Pain:  Goal: Control of chronic pain  Description: Control of chronic pain  Outcome: Met This Shift     Problem: Falls - Risk of:  Goal: Will remain free from falls  Description: Will remain free from falls  4/19/2020 0122 by Kaz Lees RN  Outcome: Met This Shift     Problem: Falls - Risk of:  Goal: Absence of physical injury  Description: Absence of physical injury  4/19/2020 0122 by Kaz Lees RN  Outcome: Met This Shift     Problem: Skin Integrity:  Goal: Absence of new skin breakdown  Description: Absence of new skin breakdown  4/19/2020 0122 by Kaz Lees RN  Outcome: Met This Shift

## 2020-04-19 NOTE — PROGRESS NOTES
Subjective: The patient is awake and alert. No acute events overnight. Denies chest pain, angina, SOB   comPlaining of right stump pain which is severe    Objective:    /63   Pulse 75   Temp 97.9 °F (36.6 °C) (Temporal)   Resp 22   Ht 6' 2\" (1.88 m)   Wt (!) 335 lb (152 kg)   SpO2 97%   BMI 43.01 kg/m²     In: 360 [P.O.:360]  Out: 1665     HEENT: NCAT,  PERRLA, No JVD  Heart:  RRR, no murmurs, gallops, or rubs.   Lungs:  CTA bilaterally, no wheeze, rales or rhonchi  Abd: bowel sounds present, nontender, nondistended, no masses  Extrem: Right thigh stump     Recent Labs     04/16/20  1501 04/17/20  0605   WBC 13.5* 12.2*   HGB 9.2* 9.2*   HCT 29.4* 29.4*    330       Recent Labs     04/16/20  1501 04/17/20  0605    132   K 4.4 5.3*    97*   CO2 25 26   BUN 19 16   CREATININE 1.3* 1.3*   CALCIUM 8.6 8.6       Assessment:    Patient Active Problem List   Diagnosis    DM II (diabetes mellitus, type II), controlled ([address]    HTN (hypertension)    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)    Coagulopathy (HCC)    Moderate protein-calorie malnutrition (HCC)    [facility]peripheral vascular disease) [facility]    Leukocytosis    Stage 3 chronic kidney disease (HCC)    Tobacco dependence    HLD (hyperlipidemia)    History of DVT (deep vein thrombosis)    Osteomyelitis ([facility])    S/P AKA (above knee amputation), right ([facility])    History of vascular surgery    Occlusion of common femoral artery ([facility])    Cellulitis, scrotum    Hyperglycemia due to type 2 diabetes mellitus ([address]    Critical lower limb ischemia    Gas gangrene of thigh ([address]    Vascular occlusion    Wound infection    Postoperative wound infection       Plan:    Admit to telemetry for evaluation of right thigh stump abscess-unhealed  Broad-spectrum IV antibiotic therapy with daptomycin and cefepime   Surgical cultures from right stump with Pseudomonas and E. coli, currently on

## 2020-04-19 NOTE — PROGRESS NOTES
Vascular Surgery Progress Note    Pt is being seen in f/u today regarding right lower extremity chronic stump wound, groin wound infection sp 3/20 R iliofemoral embolectomy, deep femoral embolectomy, 4/17 right lower extremity wound debridement     Subjective  Pt s/e.  Pain in his leg relatively well controlled with norco. Afebrile overnight     Current Medications:    sodium chloride 75 mL/hr at 04/19/20 0128    dextrose        sodium chloride flush, HYDROcodone-acetaminophen, tiZANidine, acetaminophen **OR** acetaminophen, magnesium hydroxide, promethazine **OR** ondansetron, glucose, dextrose, glucagon (rDNA), dextrose    sodium hypochlorite   Irrigation Daily    daptomycin (CUBICIN) IVPB  650 mg Intravenous Q24H    sodium chloride flush  10 mL Intravenous 2 times per day    cefepime  2 g Intravenous Q12H    sodium chloride  25 mL Intravenous Q12H    amLODIPine  5 mg Oral Daily    aspirin  81 mg Oral Daily    atorvastatin  10 mg Oral Daily    cilostazol  100 mg Oral BID    cloNIDine  0.1 mg Oral BID    docusate sodium  100 mg Oral BID    DULoxetine  120 mg Oral QAM    pantoprazole  40 mg Oral QAM AC    ferrous sulfate  325 mg Oral BID WC    insulin glargine  15 Units Subcutaneous BID    lidocaine  1 patch Transdermal Daily    lisinopril  10 mg Oral Daily    lubiprostone  24 mcg Oral BID WC    metoprolol  25 mg Oral BID    miconazole   Topical BID    pregabalin  200 mg Oral TID    senna  1 tablet Oral Daily    spironolactone  50 mg Oral QAM    tamsulosin  0.4 mg Oral Daily    enoxaparin  40 mg Subcutaneous Daily    insulin lispro  0-6 Units Subcutaneous TID WC    insulin lispro  0-3 Units Subcutaneous Nightly        PHYSICAL EXAM:    /72   Pulse 81   Temp 96.8 °F (36 °C) (Temporal)   Resp 21   Ht 6' 2\" (1.88 m)   Wt (!) 335 lb (152 kg)   SpO2 96%   BMI 43.01 kg/m²     Intake/Output Summary (Last 24 hours) at 4/19/2020 0636  Last data filed at 4/19/2020 0532  Gross per 24

## 2020-04-19 NOTE — PLAN OF CARE
Problem: Falls - Risk of:  Goal: Will remain free from falls  Description: Will remain free from falls  Outcome: Met This Shift     Problem: Falls - Risk of:  Goal: Absence of physical injury  Description: Absence of physical injury  Outcome: Met This Shift     Problem: Pain:  Goal: Pain level will decrease  Description: Pain level will decrease  Outcome: Not Met This Shift     Problem: Pain:  Goal: Control of acute pain  Description: Control of acute pain  Outcome: Not Met This Shift

## 2020-04-19 NOTE — PROGRESS NOTES
Occupational Therapy  OT SESSION ATTEMPT     Date:2020  Patient Name: Renu Galvez  MRN: [id number]  : 1955  Room: [address] [place] [address]     Attempted OT session this date:    [] unavailable due to other medical staff currently with pt   [] on hold per nursing staff   [] on hold per nursing staff secondary to lab / radiology results    [x] declined treatment this date due to R stump 8/10 pain. Benefits of participation in therapy reviewed with pt with encouragement. Pt still adamantly refused. [] off unit   [] Other:     Will reattempt OT eval at a later time.     Yumiko Ann, OTR/L #[id number]

## 2020-04-20 ENCOUNTER — ANESTHESIA EVENT (OUTPATIENT)
Dept: OPERATING ROOM | Age: 65
DRG: 463 | End: 2020-04-20
Payer: COMMERCIAL

## 2020-04-20 ENCOUNTER — ANESTHESIA (OUTPATIENT)
Dept: OPERATING ROOM | Age: 65
DRG: 463 | End: 2020-04-20
Payer: COMMERCIAL

## 2020-04-20 ENCOUNTER — TELEPHONE (OUTPATIENT)
Dept: PRIMARY CARE CLINIC | Age: 65
End: 2020-04-20

## 2020-04-20 VITALS
OXYGEN SATURATION: 98 % | DIASTOLIC BLOOD PRESSURE: 73 MMHG | RESPIRATION RATE: 10 BRPM | SYSTOLIC BLOOD PRESSURE: 128 MMHG

## 2020-04-20 LAB
ANION GAP SERPL CALCULATED.3IONS-SCNC: 9 MMOL/L (ref 7–16)
BASOPHILS ABSOLUTE: 0.05 E9/L (ref 0–0.2)
BASOPHILS RELATIVE PERCENT: 0.5 % (ref 0–2)
BUN BLDV-MCNC: 13 MG/DL (ref 8–23)
CALCIUM SERPL-MCNC: 8.4 MG/DL (ref 8.6–10.2)
CHLORIDE BLD-SCNC: 105 MMOL/L (ref 98–107)
CO2: 23 MMOL/L (ref 22–29)
CREAT SERPL-MCNC: 1.2 MG/DL (ref 0.7–1.2)
EOSINOPHILS ABSOLUTE: 0.16 E9/L (ref 0.05–0.5)
EOSINOPHILS RELATIVE PERCENT: 1.7 % (ref 0–6)
GFR AFRICAN AMERICAN: >60
GFR NON-AFRICAN AMERICAN: >60 ML/MIN/1.73
GLUCOSE BLD-MCNC: 95 MG/DL (ref 74–99)
HCT VFR BLD CALC: 27.9 % (ref 37–54)
HEMOGLOBIN: 8.8 G/DL (ref 12.5–16.5)
IMMATURE GRANULOCYTES #: 0.08 E9/L
IMMATURE GRANULOCYTES %: 0.8 % (ref 0–5)
LYMPHOCYTES ABSOLUTE: 2.18 E9/L (ref 1.5–4)
LYMPHOCYTES RELATIVE PERCENT: 22.6 % (ref 20–42)
MCH RBC QN AUTO: 26.3 PG (ref 26–35)
MCHC RBC AUTO-ENTMCNC: 31.5 % (ref 32–34.5)
MCV RBC AUTO: 83.3 FL (ref 80–99.9)
METER GLUCOSE: 121 MG/DL (ref 74–99)
METER GLUCOSE: 161 MG/DL (ref 74–99)
METER GLUCOSE: 174 MG/DL (ref 74–99)
MONOCYTES ABSOLUTE: 0.89 E9/L (ref 0.1–0.95)
MONOCYTES RELATIVE PERCENT: 9.2 % (ref 2–12)
NEUTROPHILS ABSOLUTE: 6.3 E9/L (ref 1.8–7.3)
NEUTROPHILS RELATIVE PERCENT: 65.2 % (ref 43–80)
PDW BLD-RTO: 14.3 FL (ref 11.5–15)
PLATELET # BLD: 361 E9/L (ref 130–450)
PMV BLD AUTO: 10.9 FL (ref 7–12)
POTASSIUM SERPL-SCNC: 4.4 MMOL/L (ref 3.5–5)
RBC # BLD: 3.35 E12/L (ref 3.8–5.8)
SODIUM BLD-SCNC: 137 MMOL/L (ref 132–146)
WBC # BLD: 9.7 E9/L (ref 4.5–11.5)

## 2020-04-20 PROCEDURE — 36415 COLL VENOUS BLD VENIPUNCTURE: CPT

## 2020-04-20 PROCEDURE — 7100000000 HC PACU RECOVERY - FIRST 15 MIN: Performed by: SURGERY

## 2020-04-20 PROCEDURE — 7100000001 HC PACU RECOVERY - ADDTL 15 MIN: Performed by: SURGERY

## 2020-04-20 PROCEDURE — 2580000003 HC RX 258

## 2020-04-20 PROCEDURE — 6360000002 HC RX W HCPCS: Performed by: STUDENT IN AN ORGANIZED HEALTH CARE EDUCATION/TRAINING PROGRAM

## 2020-04-20 PROCEDURE — 2580000003 HC RX 258: Performed by: SPECIALIST

## 2020-04-20 PROCEDURE — 1200000000 HC SEMI PRIVATE

## 2020-04-20 PROCEDURE — 2580000003 HC RX 258: Performed by: STUDENT IN AN ORGANIZED HEALTH CARE EDUCATION/TRAINING PROGRAM

## 2020-04-20 PROCEDURE — 6370000000 HC RX 637 (ALT 250 FOR IP): Performed by: SURGERY

## 2020-04-20 PROCEDURE — 88304 TISSUE EXAM BY PATHOLOGIST: CPT

## 2020-04-20 PROCEDURE — 2709999900 HC NON-CHARGEABLE SUPPLY: Performed by: SURGERY

## 2020-04-20 PROCEDURE — 6360000002 HC RX W HCPCS

## 2020-04-20 PROCEDURE — 3700000001 HC ADD 15 MINUTES (ANESTHESIA): Performed by: SURGERY

## 2020-04-20 PROCEDURE — 3600000012 HC SURGERY LEVEL 2 ADDTL 15MIN: Performed by: SURGERY

## 2020-04-20 PROCEDURE — 0KBN0ZZ EXCISION OF RIGHT HIP MUSCLE, OPEN APPROACH: ICD-10-PCS | Performed by: STUDENT IN AN ORGANIZED HEALTH CARE EDUCATION/TRAINING PROGRAM

## 2020-04-20 PROCEDURE — 11043 DBRDMT MUSC&/FSCA 1ST 20/<: CPT | Performed by: SURGERY

## 2020-04-20 PROCEDURE — 3700000000 HC ANESTHESIA ATTENDED CARE: Performed by: SURGERY

## 2020-04-20 PROCEDURE — 85025 COMPLETE CBC W/AUTO DIFF WBC: CPT

## 2020-04-20 PROCEDURE — 80048 BASIC METABOLIC PNL TOTAL CA: CPT

## 2020-04-20 PROCEDURE — 6370000000 HC RX 637 (ALT 250 FOR IP): Performed by: STUDENT IN AN ORGANIZED HEALTH CARE EDUCATION/TRAINING PROGRAM

## 2020-04-20 PROCEDURE — 82962 GLUCOSE BLOOD TEST: CPT

## 2020-04-20 PROCEDURE — 11042 DBRDMT SUBQ TIS 1ST 20SQCM/<: CPT | Performed by: SURGERY

## 2020-04-20 PROCEDURE — 6360000002 HC RX W HCPCS: Performed by: SPECIALIST

## 2020-04-20 PROCEDURE — 11045 DBRDMT SUBQ TISS EACH ADDL: CPT | Performed by: SURGERY

## 2020-04-20 PROCEDURE — 99221 1ST HOSP IP/OBS SF/LOW 40: CPT | Performed by: ORTHOPAEDIC SURGERY

## 2020-04-20 PROCEDURE — 3600000002 HC SURGERY LEVEL 2 BASE: Performed by: SURGERY

## 2020-04-20 PROCEDURE — 11046 DBRDMT MUSC&/FSCA EA ADDL: CPT | Performed by: SURGERY

## 2020-04-20 RX ORDER — OXYCODONE HYDROCHLORIDE AND ACETAMINOPHEN 5; 325 MG/1; MG/1
1 TABLET ORAL EVERY 4 HOURS PRN
Status: DISCONTINUED | OUTPATIENT
Start: 2020-04-20 | End: 2020-04-22

## 2020-04-20 RX ORDER — LABETALOL HYDROCHLORIDE 5 MG/ML
5 INJECTION, SOLUTION INTRAVENOUS EVERY 4 HOURS PRN
Status: DISCONTINUED | OUTPATIENT
Start: 2020-04-20 | End: 2020-05-04 | Stop reason: HOSPADM

## 2020-04-20 RX ORDER — SODIUM HYPOCHLORITE 5 MG/ML
SOLUTION TOPICAL ONCE
Status: COMPLETED | OUTPATIENT
Start: 2020-04-20 | End: 2020-04-20

## 2020-04-20 RX ORDER — OXYCODONE HYDROCHLORIDE AND ACETAMINOPHEN 5; 325 MG/1; MG/1
2 TABLET ORAL EVERY 4 HOURS PRN
Status: DISCONTINUED | OUTPATIENT
Start: 2020-04-20 | End: 2020-04-22

## 2020-04-20 RX ORDER — PROPOFOL 10 MG/ML
INJECTION, EMULSION INTRAVENOUS CONTINUOUS PRN
Status: DISCONTINUED | OUTPATIENT
Start: 2020-04-20 | End: 2020-04-20 | Stop reason: SDUPTHER

## 2020-04-20 RX ORDER — SODIUM CHLORIDE 9 MG/ML
INJECTION, SOLUTION INTRAVENOUS CONTINUOUS PRN
Status: DISCONTINUED | OUTPATIENT
Start: 2020-04-20 | End: 2020-04-20 | Stop reason: SDUPTHER

## 2020-04-20 RX ORDER — MIDAZOLAM HYDROCHLORIDE 1 MG/ML
INJECTION INTRAMUSCULAR; INTRAVENOUS PRN
Status: DISCONTINUED | OUTPATIENT
Start: 2020-04-20 | End: 2020-04-20 | Stop reason: SDUPTHER

## 2020-04-20 RX ORDER — FENTANYL CITRATE 50 UG/ML
INJECTION, SOLUTION INTRAMUSCULAR; INTRAVENOUS PRN
Status: DISCONTINUED | OUTPATIENT
Start: 2020-04-20 | End: 2020-04-20 | Stop reason: SDUPTHER

## 2020-04-20 RX ORDER — MORPHINE SULFATE 2 MG/ML
2 INJECTION, SOLUTION INTRAMUSCULAR; INTRAVENOUS
Status: DISCONTINUED | OUTPATIENT
Start: 2020-04-20 | End: 2020-04-21 | Stop reason: ALTCHOICE

## 2020-04-20 RX ADMIN — MORPHINE SULFATE 2 MG: 2 INJECTION, SOLUTION INTRAMUSCULAR; INTRAVENOUS at 16:29

## 2020-04-20 RX ADMIN — LISINOPRIL 10 MG: 20 TABLET ORAL at 12:40

## 2020-04-20 RX ADMIN — Medication 10 ML: at 21:11

## 2020-04-20 RX ADMIN — CILOSTAZOL 100 MG: 100 TABLET ORAL at 21:10

## 2020-04-20 RX ADMIN — FENTANYL CITRATE 100 MCG: 50 INJECTION, SOLUTION INTRAMUSCULAR; INTRAVENOUS at 10:14

## 2020-04-20 RX ADMIN — PROPOFOL 50 MCG/KG/MIN: 10 INJECTION, EMULSION INTRAVENOUS at 09:36

## 2020-04-20 RX ADMIN — CEFEPIME 2 G: 2 INJECTION, POWDER, FOR SOLUTION INTRAVENOUS at 21:10

## 2020-04-20 RX ADMIN — SPIRONOLACTONE 50 MG: 25 TABLET ORAL at 12:40

## 2020-04-20 RX ADMIN — CEFEPIME 2 G: 2 INJECTION, POWDER, FOR SOLUTION INTRAVENOUS at 12:41

## 2020-04-20 RX ADMIN — LUBIPROSTONE 24 MCG: 24 CAPSULE, GELATIN COATED ORAL at 18:21

## 2020-04-20 RX ADMIN — MICONAZOLE NITRATE: 20.6 POWDER TOPICAL at 21:11

## 2020-04-20 RX ADMIN — PREGABALIN 200 MG: 100 CAPSULE ORAL at 15:34

## 2020-04-20 RX ADMIN — SODIUM CHLORIDE: 9 INJECTION, SOLUTION INTRAVENOUS at 04:05

## 2020-04-20 RX ADMIN — OXYCODONE HYDROCHLORIDE AND ACETAMINOPHEN 1 TABLET: 5; 325 TABLET ORAL at 06:12

## 2020-04-20 RX ADMIN — ATORVASTATIN CALCIUM 10 MG: 10 TABLET, FILM COATED ORAL at 12:39

## 2020-04-20 RX ADMIN — INSULIN LISPRO 1 UNITS: 100 INJECTION, SOLUTION INTRAVENOUS; SUBCUTANEOUS at 21:12

## 2020-04-20 RX ADMIN — AMLODIPINE BESYLATE 5 MG: 5 TABLET ORAL at 12:40

## 2020-04-20 RX ADMIN — MORPHINE SULFATE 2 MG: 2 INJECTION, SOLUTION INTRAMUSCULAR; INTRAVENOUS at 13:22

## 2020-04-20 RX ADMIN — METOPROLOL TARTRATE 25 MG: 25 TABLET, FILM COATED ORAL at 21:10

## 2020-04-20 RX ADMIN — SODIUM CHLORIDE: 9 INJECTION, SOLUTION INTRAVENOUS at 09:27

## 2020-04-20 RX ADMIN — ONDANSETRON HYDROCHLORIDE 4 MG: 2 SOLUTION INTRAMUSCULAR; INTRAVENOUS at 21:24

## 2020-04-20 RX ADMIN — FERROUS SULFATE TAB 325 MG (65 MG ELEMENTAL FE) 325 MG: 325 (65 FE) TAB at 18:21

## 2020-04-20 RX ADMIN — FENTANYL CITRATE 50 MCG: 50 INJECTION, SOLUTION INTRAMUSCULAR; INTRAVENOUS at 09:51

## 2020-04-20 RX ADMIN — SODIUM CHLORIDE: 9 INJECTION, SOLUTION INTRAVENOUS at 09:03

## 2020-04-20 RX ADMIN — ENOXAPARIN SODIUM 40 MG: 100 INJECTION SUBCUTANEOUS at 12:38

## 2020-04-20 RX ADMIN — ASPIRIN 81 MG 81 MG: 81 TABLET ORAL at 12:39

## 2020-04-20 RX ADMIN — DULOXETINE HYDROCHLORIDE 120 MG: 60 CAPSULE, DELAYED RELEASE ORAL at 12:40

## 2020-04-20 RX ADMIN — MORPHINE SULFATE 2 MG: 2 INJECTION, SOLUTION INTRAMUSCULAR; INTRAVENOUS at 21:11

## 2020-04-20 RX ADMIN — Medication 10 ML: at 12:49

## 2020-04-20 RX ADMIN — CLONIDINE HYDROCHLORIDE 0.1 MG: 0.1 TABLET ORAL at 21:10

## 2020-04-20 RX ADMIN — SODIUM CHLORIDE: 9 INJECTION, SOLUTION INTRAVENOUS at 17:42

## 2020-04-20 RX ADMIN — STANDARDIZED SENNA CONCENTRATE 8.6 MG: 8.6 TABLET ORAL at 12:39

## 2020-04-20 RX ADMIN — SODIUM CHLORIDE 25 ML: 9 INJECTION, SOLUTION INTRAVENOUS at 07:02

## 2020-04-20 RX ADMIN — CEFEPIME 2 G: 2 INJECTION, POWDER, FOR SOLUTION INTRAVENOUS at 04:21

## 2020-04-20 RX ADMIN — DOCUSATE SODIUM 100 MG: 100 CAPSULE, LIQUID FILLED ORAL at 21:10

## 2020-04-20 RX ADMIN — PREGABALIN 200 MG: 100 CAPSULE ORAL at 21:10

## 2020-04-20 RX ADMIN — TAMSULOSIN HYDROCHLORIDE 0.4 MG: 0.4 CAPSULE ORAL at 12:38

## 2020-04-20 RX ADMIN — SODIUM CHLORIDE 25 ML: 9 INJECTION, SOLUTION INTRAVENOUS at 19:05

## 2020-04-20 RX ADMIN — MIDAZOLAM 2 MG: 1 INJECTION INTRAMUSCULAR; INTRAVENOUS at 09:27

## 2020-04-20 RX ADMIN — INSULIN GLARGINE 15 UNITS: 100 INJECTION, SOLUTION SUBCUTANEOUS at 21:12

## 2020-04-20 RX ADMIN — FENTANYL CITRATE 100 MCG: 50 INJECTION, SOLUTION INTRAMUSCULAR; INTRAVENOUS at 09:27

## 2020-04-20 ASSESSMENT — PAIN SCALES - GENERAL
PAINLEVEL_OUTOF10: 10
PAINLEVEL_OUTOF10: 0
PAINLEVEL_OUTOF10: 10
PAINLEVEL_OUTOF10: 8
PAINLEVEL_OUTOF10: 8
PAINLEVEL_OUTOF10: 0
PAINLEVEL_OUTOF10: 8
PAINLEVEL_OUTOF10: 0
PAINLEVEL_OUTOF10: 5
PAINLEVEL_OUTOF10: 5
PAINLEVEL_OUTOF10: 0

## 2020-04-20 ASSESSMENT — PAIN - FUNCTIONAL ASSESSMENT: PAIN_FUNCTIONAL_ASSESSMENT: PREVENTS OR INTERFERES SOME ACTIVE ACTIVITIES AND ADLS

## 2020-04-20 ASSESSMENT — PAIN DESCRIPTION - DESCRIPTORS: DESCRIPTORS: ACHING;CRAMPING;CONSTANT

## 2020-04-20 ASSESSMENT — PULMONARY FUNCTION TESTS
PIF_VALUE: 0

## 2020-04-20 ASSESSMENT — PAIN DESCRIPTION - FREQUENCY: FREQUENCY: CONTINUOUS

## 2020-04-20 ASSESSMENT — PAIN DESCRIPTION - PROGRESSION: CLINICAL_PROGRESSION: GRADUALLY WORSENING

## 2020-04-20 ASSESSMENT — LIFESTYLE VARIABLES: SMOKING_STATUS: 1

## 2020-04-20 ASSESSMENT — PAIN DESCRIPTION - LOCATION: LOCATION: LEG

## 2020-04-20 ASSESSMENT — ENCOUNTER SYMPTOMS: SHORTNESS OF BREATH: 0

## 2020-04-20 ASSESSMENT — PAIN DESCRIPTION - ONSET: ONSET: ON-GOING

## 2020-04-20 ASSESSMENT — PAIN DESCRIPTION - ORIENTATION: ORIENTATION: RIGHT

## 2020-04-20 NOTE — CONSULTS
oriented, in no acute distress  Skin:  See extremities   Head/face:  NCAT  Eyes:  No gross abnormalities. Lungs:  Normal expansion. Clear to auscultation. No rales, rhonchi, or wheezing. Heart:  Heart regular rate and rhythm  Abdomen:  Soft, non-tender, normal bowel sounds  Extremities: open wound at groin with purulent drainage, necrotic muscle anteriorly and laterally with purulent drainage from right thigh             LABS:    CBC  Recent Labs     04/20/20  0600   WBC 9.7   HGB 8.8*   HCT 27.9*        BMP  Recent Labs     04/20/20  0600      K 4.4      CO2 23   BUN 13   CREATININE 1.2   CALCIUM 8.4*       ASSESSMENT:  60 y.o. male with right AKA stumo necrosis     PLAN:  To OR tomorrow with vascular surgery  Will evaluate in OR  D/w Wilian Grullon PA-C for Dr. Ludivina Bains    Electronically signed by Mark Mercer DO on 4/20/20 at 11:25 AM EDT      Attending Physician Statement  I have discussed the case, including pertinent history and exam findings with the resident. I have seen and examined the patient and the key elements of all parts of the encounter have been performed by me. I agree with the assessment, exam, plan and orders as documented by the resident. This is a 28-year-old male with history of diabetes and a vasculopath who underwent an above-knee amputation in 2018. Patient was ambulatory on this limb however developed acute ischemic episode of that limb. Patient is status post embolectomy and significant wounding on the thigh anteriorly. Patient underwent multiple debridements with vascular surgery indicating anterior compartment compromise with what appears to be viability of the posterior compartment musculature. Plan from a vascular surgery standpoint is to shorten the limb and our assistance is required for muscle flap closure of the defect.     On exam during intraoperative consultation during a vascular surgery procedure indicates posterior compartment appears viable with

## 2020-04-20 NOTE — ANESTHESIA POSTPROCEDURE EVALUATION
Department of Anesthesiology  Postprocedure Note    Patient: Audra Cervantes  MRN: [id number]  YOB: 1955  Date of evaluation: 4/20/2020  Time:  10:52 AM     Procedure Summary     Date:  04/20/20 Room / Location:  [facility] OR  / [facility]    Anesthesia Start:  1157 Anesthesia Stop:  [phone]    Procedure:  RIGHT THIGH WOUND DRESSING CHANGE; DEBRIDEMENT, removal of muscle (Right ) Diagnosis:  (RIGHT THIGH NECROSIS)    Surgeon:  Velma Schaffer MD Responsible Provider:  Harsh Mcneil MD    Anesthesia Type:  MAC ASA Status:  4          Anesthesia Type: No value filed. Blaine Phase I: Blaine Score: 10    Blaine Phase II:      Last vitals: Reviewed and per EMR flowsheets.        Anesthesia Post Evaluation    Patient location during evaluation: PACU  Patient participation: complete - patient participated  Level of consciousness: awake and alert  Pain score: 0  Airway patency: patent  Nausea & Vomiting: no vomiting and no nausea  Complications: no  Cardiovascular status: hemodynamically stable  Respiratory status: acceptable  Hydration status: stable

## 2020-04-20 NOTE — PROGRESS NOTES
Message left for Attending Provider via [address] [place] [address] Avenue (voicemail) to ask if patient may be transported to a general floor.

## 2020-04-20 NOTE — PROGRESS NOTES
Wound care: Consulted for infected wound by medical. Patient in surgery for second debridement of thigh wound- surgery following. Orders reviewed and updated. Will defer to surgery for infected wound. Dolphin surface ordered. Will follow as needed.  David Colon

## 2020-04-20 NOTE — CONSULTS
Orthopaedic Attending   Consult Note    I was asked to evaluate the patient today as he was in the OR by the vascular surgery team.  Patient with significant history to the right lower extremity. Is status post above-knee amputation with a healed distal stump. Patient has significant ischemia to the limb with current large open wound and necrotic eschar approximately to the level of the inguinal fold. He also has separate longitudinal wound more medially which was for previous vascular access and evaluation. The entire anterior thigh is opened down to the quadriceps muscle belly which has overlying eschar and necrosis. There is significant mucopurulence about the quadriceps tunneling into the adductor region and musculature compartment and to lesser extent laterally. It was discussed with the vascular attending that patient will require more proximal amputation in order to remove necrotic dead tissue. Consent agreement made for return to the OR this Wednesday for aggressive debridement of the right thigh and likely more proximal amputation of the femur versus possible hip disarticulation. Will likely wound VAC the wound at that time after extensive debridement which we will do in conjunction with the vascular surgeon and more definitive planning will be based on amount of viable soft tissue for closure. We may need to involve plastic surgery depending on what our debridement entails on Wednesday.     Electronically Signed By  Luis Davila D.O.  4/20/2020  10:38 AM

## 2020-04-20 NOTE — ANESTHESIA PRE PROCEDURE
every morning (before breakfast)    Historical Provider, MD       Current medications:    No current facility-administered medications for this visit. No current outpatient medications on file.      Facility-Administered Medications Ordered in Other Visits   Medication Dose Route Frequency Provider Last Rate Last Dose    oxyCODONE-acetaminophen (PERCOCET) 5-325 MG per tablet 1 tablet  1 tablet Oral Q4H PRN Gloriajean Bue, DO        Or    oxyCODONE-acetaminophen (PERCOCET) 5-325 MG per tablet 2 tablet  2 tablet Oral Q4H PRN Gloriajean Bue, DO        morphine (PF) injection 2 mg  2 mg Intravenous Q2H PRN Gloriajean Bue, DO        sodium hypochloriteth) (DAKINS) external solution   Irrigation Once Aracely Ricardo MD        cefepime (MAXIPIME) 2 g IVPB extended (mini-bag)  2 g Intravenous Q8H Sharon Grover MD 12.5 mL/hr at 04/20/20 0421 2 g at 04/20/20 0421    And    0.9 % sodium chloride infusion   Intravenous Q8H Sharon Grover MD   Stopped at 04/19/20 1756    sodium hypochlorite (DAKINS) 0.125 % external solution   Irrigation Daily Joan Andrade MD        sodium chloride flush 0.9 % injection 10 mL  10 mL Intravenous 2 times per day Sharon Grover MD   10 mL at 04/18/20 2051    sodium chloride flush 0.9 % injection 10 mL  10 mL Intravenous PRN Sharon Grover MD        0.9 % sodium chloride infusion admixture  25 mL Intravenous Q12H Sharon Grover MD   Stopped at 04/18/20 2250    amLODIPine (NORVASC) tablet 5 mg  5 mg Oral Daily Joan Andrade MD   5 mg at 04/19/20 0818    aspirin chewable tablet 81 mg  81 mg Oral Daily Joan Andrade MD   81 mg at 04/19/20 5537    atorvastatin (LIPITOR) tablet 10 mg  10 mg Oral Daily Joan Andrade MD   10 mg at 04/19/20 5650    cilostazol (PLETAL) tablet 100 mg  100 mg Oral BID Joan Andrade MD   100 mg at 04/19/20 2016    cloNIDine (CATAPRES) tablet 0.1 mg  0.1 mg Oral BID Joan Andrade MD   0.1 mg at 04/19/20 2024    docusate sodium (COLACE) capsule 100 mg  100 mg Oral BID Lucia Souza MD   100 mg at 04/19/20 2016    DULoxetine (CYMBALTA) extended release capsule 120 mg  120 mg Oral QAM Lucia Souza MD   120 mg at 04/19/20 0811    pantoprazole (PROTONIX) tablet 40 mg  40 mg Oral QAM AC Lucia Souza MD   40 mg at 04/19/20 2128    ferrous sulfate (IRON 325) tablet 325 mg  325 mg Oral BID  Lucia Souza MD   325 mg at 04/19/20 1708    insulin glargine (LANTUS) injection vial 15 Units  15 Units Subcutaneous BID Lucia Souza MD   15 Units at 04/19/20 2016    lidocaine 4 % external patch 1 patch  1 patch Transdermal Daily Lucia Souza MD        lisinopril (PRINIVIL;ZESTRIL) tablet 10 mg  10 mg Oral Daily Lucia Souza MD   10 mg at 04/19/20 0084    lubiprostone (AMITIZA) capsule 24 mcg  24 mcg Oral BID  Lucia Souza MD   24 mcg at 04/19/20 1708    metoprolol tartrate (LOPRESSOR) tablet 25 mg  25 mg Oral BID Lucia Souza MD   25 mg at 04/19/20 2018    miconazole (MICOTIN) 2 % powder   Topical BID Lucia Souza MD        pregabalin (LYRICA) capsule 200 mg  200 mg Oral TID Lucia Souza MD   200 mg at 04/19/20 2016    senna (SENOKOT) tablet 8.6 mg  1 tablet Oral Daily Lucia Souza MD   8.6 mg at 04/19/20 0811    spironolactone (ALDACTONE) tablet 50 mg  50 mg Oral QAM Lucia Souza MD   50 mg at 04/19/20 0811    tamsulosin (FLOMAX) capsule 0.4 mg  0.4 mg Oral Daily Lucia Souza MD   0.4 mg at 04/19/20 0811    tiZANidine (ZANAFLEX) tablet 4 mg  4 mg Oral Q8H PRN Lucia Souza MD        0.9 % sodium chloride infusion   Intravenous Continuous Lucia Souza MD 75 mL/hr at 04/20/20 0405      acetaminophen (TYLENOL) tablet 650 mg  650 mg Oral Q6H PRN Lucia Souza MD   650 mg at 04/16/20 2125    Or    acetaminophen (TYLENOL) suppository 650 mg  650 mg Rectal Q6H PRN Lucia Souza MD        magnesium hydroxide (MILK OF MAGNESIA) 400 MG/5ML suspension 30 mL  30 mL Oral Daily PRN T81. 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 performed by Willy Kaminski MD at [address], [place] Right 4/17/2020    RIGHT LEG DEBRIDEMENT INCISION AND DRAINAGE performed by Argenis Orellana MD at [address] [place] [address] Right 11/1/2018    AMPUTATION ABOVE KNEE RIGHT LEG performed by Willy Kaminski MD at [place] [address] Right 5/24/2018    RIGHT FOOT INCISION AND DRAINAGE WITH PARTIAL BONE RESECTION performed by Terri Medrano DPM at [address] [place] [address] OFFICE/OUTPT VISIT,PROCEDURE ONLY Right 8/3/2018    INCISION AND DRAINAGE MULTIPLE AREAS RIGHT FOOT WITH DEBRIDEMENT SOFT TISSUE performed by Terri Medrano DPM at [place] [address] Right     leg        Social History:    Social History     Tobacco Use    Smoking status: Current Every Day Smoker     Packs/day: 0.50     Years: 7.00     Pack years: 3.50     Types: Cigarettes    Smokeless tobacco: Never Used    Tobacco comment: 5-7 a day    Substance Use Topics    Alcohol use:  No                                Ready to quit: Not Answered  Counseling given: systolic function.   Ejection fraction is visually estimated at > 60%.   Normal right ventricular function.   No evidence of a left atrial appendage thrombus.   No evidence of interatrial shunting on bubble study.   Mild mitral regurgitation.       CXR 8/6/2018  1. Left-sided PICC line tip in the left inguinal related vein   projection. 2. No pneumothorax on the right on the left.         Bone scan 10/25/2018    1. Abnormal right midfoot multiphase scintigraphic findings consistent   with ongoing osteomyelitis here under appropriate clinical   circumstances. Clinical and possible updated radiographic correlation   recommended. 2. Abnormal medial left forefoot multiphase scintigraphic findings,   suggestive of possible ongoing osteomyelitis here also, under   appropriate clinical circumstances. Clinical and possible radiographic   correlation recommended. 3. Additional multifocal bilateral lower extremity scintigraphic   findings more likely related to degenerative type changes as noted.         Lower extremity ultrasound 7/31/2018  CONCLUSION:       1. Superficial thrombophlebitis involving the left greater saphenous   vein.    2. No evidence of deep venous thrombosis involving the right lower   extremity.           Anesthesia Evaluation  Patient summary reviewed and Nursing notes reviewed no history of anesthetic complications:   Airway: Mallampati: IV     Neck ROM: full  Mouth opening: > = 3 FB Dental:          Pulmonary: breath sounds clear to auscultation  (+) pneumonia (Hx Legionnaire's disease ):  sleep apnea: on CPAP,  decreased breath sounds,  current smoker (last cigarette 4 days ago; pt states he smokes less than a 1/2 ppd)    (-) COPD, asthma and shortness of breath                          ROS comment: Hx rhinoplasty   Cardiovascular:  Exercise tolerance: poor (<4 METS),   (+) hypertension:, hyperlipidemia    (-) pacemaker, past MI, dysrhythmias and  angina    ECG reviewed  Rhythm: regular  Rate:

## 2020-04-20 NOTE — PROGRESS NOTES
[address] [place] [address] [facility]  JOEIDA  Progress Note      Chief Complaint   Patient presents with    Wound Infection     right above knee amputation, drainage x 1 week       SUBJECTIVE:  Patient seen and examined at bedside  Patient is awake and alert  He is very emotional and crying  He denies any pain  He denies any fevers or chills  He denies any shortness of breath or chest pain  Patient is tolerating medications. No reported adverse drug reactions. No nausea, vomiting, diarrhea. Right lower extremity chronic stump wound, groin wound infection sp 3/20 R iliofemoral embolectomy, deep femoral embolectomy, 4/17 right lower extremity extensive debridement down to the muscle of right upper thigh, 04/20 right thigh necrosis excisional debridement of muscle, subcutaneous tissue and skin       Review of systems:  As stated above in the chief complaint, otherwise negative.     Medications:  Scheduled Meds:   cefepime  2 g Intravenous Q8H    sodium hypochlorite   Irrigation Daily    sodium chloride flush  10 mL Intravenous 2 times per day    sodium chloride  25 mL Intravenous Q12H    amLODIPine  5 mg Oral Daily    aspirin  81 mg Oral Daily    atorvastatin  10 mg Oral Daily    cilostazol  100 mg Oral BID    cloNIDine  0.1 mg Oral BID    docusate sodium  100 mg Oral BID    DULoxetine  120 mg Oral QAM    pantoprazole  40 mg Oral QAM AC    ferrous sulfate  325 mg Oral BID WC    insulin glargine  15 Units Subcutaneous BID    lidocaine  1 patch Transdermal Daily    lisinopril  10 mg Oral Daily    lubiprostone  24 mcg Oral BID WC    metoprolol  25 mg Oral BID    miconazole   Topical BID    pregabalin  200 mg Oral TID    senna  1 tablet Oral Daily    spironolactone  50 mg Oral QAM    tamsulosin  0.4 mg Oral Daily    enoxaparin  40 mg Subcutaneous Daily    insulin lispro  0-6 Units Subcutaneous TID     insulin lispro  0-3 Units Subcutaneous Nightly     Continuous Infusions:   sodium

## 2020-04-20 NOTE — PROGRESS NOTES
0700  Last data filed at 4/20/2020 0601  Gross per 24 hour   Intake 1775 ml   Output 2225 ml   Net -450 ml          Gen: awake, alert and oriented x3, no apparent distress  CVS: RRR  Resp: No increased work of breathing  Abd: Soft, non-tender, non-distended  R LE: Large right lower extremity wound down to muscle, no purulent drainage, foul odor, groin wound without purulent drainage, muscle at base of wound is grey and ischemic appearing     LABS:    Lab Results   Component Value Date    WBC 9.7 04/20/2020    HGB 8.8 (L) 04/20/2020    HCT 27.9 (L) 04/20/2020     04/20/2020    PROTIME 18.4 (H) 04/16/2020    INR 1.6 04/16/2020    APTT 31.6 04/16/2020    K 5.3 (H) 04/17/2020    BUN 16 04/17/2020    CREATININE 1.3 (H) 04/17/2020       A/P  60 y.o. male with right lower extremity chronic stump wound, groin wound infection sp 3/20 R iliofemoral embolectomy, deep femoral embolectomy, 4/17 Right lower extremity wound infection     NPO  ID consult appreciated- continue daptomycin and cefepime  Plan for re-eval in OR with orthopedics today, possible need for hip disarticulation in the near future     Electronically signed by You Brizuela DO on 4/20/2020 at 7:00 AM

## 2020-04-21 ENCOUNTER — ANESTHESIA (OUTPATIENT)
Dept: OPERATING ROOM | Age: 65
DRG: 463 | End: 2020-04-21
Payer: COMMERCIAL

## 2020-04-21 ENCOUNTER — ANESTHESIA EVENT (OUTPATIENT)
Dept: OPERATING ROOM | Age: 65
DRG: 463 | End: 2020-04-21
Payer: COMMERCIAL

## 2020-04-21 ENCOUNTER — APPOINTMENT (OUTPATIENT)
Dept: GENERAL RADIOLOGY | Age: 65
DRG: 463 | End: 2020-04-21
Payer: COMMERCIAL

## 2020-04-21 VITALS
RESPIRATION RATE: 18 BRPM | OXYGEN SATURATION: 98 % | DIASTOLIC BLOOD PRESSURE: 72 MMHG | SYSTOLIC BLOOD PRESSURE: 115 MMHG

## 2020-04-21 LAB
ABO/RH: NORMAL
ANTIBODY SCREEN: NORMAL
BLOOD CULTURE, ROUTINE: NORMAL
CULTURE, BLOOD 2: NORMAL
METER GLUCOSE: 116 MG/DL (ref 74–99)
METER GLUCOSE: 129 MG/DL (ref 74–99)
METER GLUCOSE: 176 MG/DL (ref 74–99)
ORGANISM: ABNORMAL
PREALBUMIN: 12 MG/DL (ref 20–40)
URINE CULTURE, ROUTINE: NORMAL
WOUND/ABSCESS: ABNORMAL

## 2020-04-21 PROCEDURE — 36592 COLLECT BLOOD FROM PICC: CPT

## 2020-04-21 PROCEDURE — 2580000003 HC RX 258: Performed by: SPECIALIST

## 2020-04-21 PROCEDURE — 3600000002 HC SURGERY LEVEL 2 BASE: Performed by: SURGERY

## 2020-04-21 PROCEDURE — 6360000002 HC RX W HCPCS: Performed by: ANESTHESIOLOGY

## 2020-04-21 PROCEDURE — 2580000003 HC RX 258: Performed by: NURSE ANESTHETIST, CERTIFIED REGISTERED

## 2020-04-21 PROCEDURE — 87075 CULTR BACTERIA EXCEPT BLOOD: CPT

## 2020-04-21 PROCEDURE — 3600000012 HC SURGERY LEVEL 2 ADDTL 15MIN: Performed by: SURGERY

## 2020-04-21 PROCEDURE — 94660 CPAP INITIATION&MGMT: CPT

## 2020-04-21 PROCEDURE — 2500000003 HC RX 250 WO HCPCS: Performed by: STUDENT IN AN ORGANIZED HEALTH CARE EDUCATION/TRAINING PROGRAM

## 2020-04-21 PROCEDURE — 71045 X-RAY EXAM CHEST 1 VIEW: CPT

## 2020-04-21 PROCEDURE — 84134 ASSAY OF PREALBUMIN: CPT

## 2020-04-21 PROCEDURE — 6360000002 HC RX W HCPCS: Performed by: SPECIALIST

## 2020-04-21 PROCEDURE — 7100000001 HC PACU RECOVERY - ADDTL 15 MIN: Performed by: SURGERY

## 2020-04-21 PROCEDURE — 6360000002 HC RX W HCPCS: Performed by: NURSE ANESTHETIST, CERTIFIED REGISTERED

## 2020-04-21 PROCEDURE — 87070 CULTURE OTHR SPECIMN AEROBIC: CPT

## 2020-04-21 PROCEDURE — 2580000003 HC RX 258: Performed by: STUDENT IN AN ORGANIZED HEALTH CARE EDUCATION/TRAINING PROGRAM

## 2020-04-21 PROCEDURE — 86850 RBC ANTIBODY SCREEN: CPT

## 2020-04-21 PROCEDURE — P9016 RBC LEUKOCYTES REDUCED: HCPCS

## 2020-04-21 PROCEDURE — 82962 GLUCOSE BLOOD TEST: CPT

## 2020-04-21 PROCEDURE — 11043 DBRDMT MUSC&/FSCA 1ST 20/<: CPT | Performed by: SURGERY

## 2020-04-21 PROCEDURE — 6370000000 HC RX 637 (ALT 250 FOR IP): Performed by: STUDENT IN AN ORGANIZED HEALTH CARE EDUCATION/TRAINING PROGRAM

## 2020-04-21 PROCEDURE — 86900 BLOOD TYPING SEROLOGIC ABO: CPT

## 2020-04-21 PROCEDURE — 36415 COLL VENOUS BLD VENIPUNCTURE: CPT

## 2020-04-21 PROCEDURE — 6360000002 HC RX W HCPCS: Performed by: STUDENT IN AN ORGANIZED HEALTH CARE EDUCATION/TRAINING PROGRAM

## 2020-04-21 PROCEDURE — 2700000000 HC OXYGEN THERAPY PER DAY

## 2020-04-21 PROCEDURE — 86923 COMPATIBILITY TEST ELECTRIC: CPT

## 2020-04-21 PROCEDURE — 0KBQ0ZZ EXCISION OF RIGHT UPPER LEG MUSCLE, OPEN APPROACH: ICD-10-PCS | Performed by: SURGERY

## 2020-04-21 PROCEDURE — 87186 SC STD MICRODIL/AGAR DIL: CPT

## 2020-04-21 PROCEDURE — 11046 DBRDMT MUSC&/FSCA EA ADDL: CPT | Performed by: SURGERY

## 2020-04-21 PROCEDURE — 7100000000 HC PACU RECOVERY - FIRST 15 MIN: Performed by: SURGERY

## 2020-04-21 PROCEDURE — 88304 TISSUE EXAM BY PATHOLOGIST: CPT

## 2020-04-21 PROCEDURE — 86901 BLOOD TYPING SEROLOGIC RH(D): CPT

## 2020-04-21 PROCEDURE — 1200000000 HC SEMI PRIVATE

## 2020-04-21 PROCEDURE — 87077 CULTURE AEROBIC IDENTIFY: CPT

## 2020-04-21 PROCEDURE — 0KBN0ZZ EXCISION OF RIGHT HIP MUSCLE, OPEN APPROACH: ICD-10-PCS | Performed by: SURGERY

## 2020-04-21 PROCEDURE — 6360000002 HC RX W HCPCS: Performed by: SURGERY

## 2020-04-21 PROCEDURE — 3700000000 HC ANESTHESIA ATTENDED CARE: Performed by: SURGERY

## 2020-04-21 PROCEDURE — 87205 SMEAR GRAM STAIN: CPT

## 2020-04-21 PROCEDURE — 3700000001 HC ADD 15 MINUTES (ANESTHESIA): Performed by: SURGERY

## 2020-04-21 RX ORDER — MEPERIDINE HYDROCHLORIDE 25 MG/ML
12.5 INJECTION INTRAMUSCULAR; INTRAVENOUS; SUBCUTANEOUS EVERY 5 MIN PRN
Status: DISCONTINUED | OUTPATIENT
Start: 2020-04-21 | End: 2020-04-21

## 2020-04-21 RX ORDER — SODIUM CHLORIDE 9 MG/ML
INJECTION, SOLUTION INTRAVENOUS CONTINUOUS PRN
Status: DISCONTINUED | OUTPATIENT
Start: 2020-04-21 | End: 2020-04-21 | Stop reason: SDUPTHER

## 2020-04-21 RX ORDER — MIDAZOLAM HYDROCHLORIDE 1 MG/ML
INJECTION INTRAMUSCULAR; INTRAVENOUS PRN
Status: DISCONTINUED | OUTPATIENT
Start: 2020-04-21 | End: 2020-04-21 | Stop reason: SDUPTHER

## 2020-04-21 RX ORDER — HYDROCODONE BITARTRATE AND ACETAMINOPHEN 5; 325 MG/1; MG/1
2 TABLET ORAL PRN
Status: DISCONTINUED | OUTPATIENT
Start: 2020-04-21 | End: 2020-04-21

## 2020-04-21 RX ORDER — CEFEPIME HYDROCHLORIDE 2 G/1
INJECTION, POWDER, FOR SOLUTION INTRAVENOUS PRN
Status: DISCONTINUED | OUTPATIENT
Start: 2020-04-21 | End: 2020-04-21 | Stop reason: SDUPTHER

## 2020-04-21 RX ORDER — MORPHINE SULFATE 2 MG/ML
2 INJECTION, SOLUTION INTRAMUSCULAR; INTRAVENOUS EVERY 5 MIN PRN
Status: DISCONTINUED | OUTPATIENT
Start: 2020-04-21 | End: 2020-04-21

## 2020-04-21 RX ORDER — SODIUM HYPOCHLORITE 1.25 MG/ML
SOLUTION TOPICAL DAILY
Status: DISCONTINUED | OUTPATIENT
Start: 2020-04-21 | End: 2020-04-21 | Stop reason: SDUPTHER

## 2020-04-21 RX ORDER — FENTANYL CITRATE 50 UG/ML
INJECTION, SOLUTION INTRAMUSCULAR; INTRAVENOUS PRN
Status: DISCONTINUED | OUTPATIENT
Start: 2020-04-21 | End: 2020-04-21 | Stop reason: SDUPTHER

## 2020-04-21 RX ORDER — PROMETHAZINE HYDROCHLORIDE 25 MG/ML
6.25 INJECTION, SOLUTION INTRAMUSCULAR; INTRAVENOUS EVERY 10 MIN PRN
Status: DISCONTINUED | OUTPATIENT
Start: 2020-04-21 | End: 2020-04-21

## 2020-04-21 RX ORDER — PROPOFOL 10 MG/ML
INJECTION, EMULSION INTRAVENOUS CONTINUOUS PRN
Status: DISCONTINUED | OUTPATIENT
Start: 2020-04-21 | End: 2020-04-21 | Stop reason: SDUPTHER

## 2020-04-21 RX ORDER — EPINEPHRINE 1 MG/ML
INJECTION, SOLUTION, CONCENTRATE INTRAVENOUS PRN
Status: DISCONTINUED | OUTPATIENT
Start: 2020-04-21 | End: 2020-04-21 | Stop reason: ALTCHOICE

## 2020-04-21 RX ORDER — SODIUM HYPOCHLORITE 2.5 MG/ML
SOLUTION TOPICAL DAILY
Status: DISCONTINUED | OUTPATIENT
Start: 2020-04-21 | End: 2020-05-04 | Stop reason: HOSPADM

## 2020-04-21 RX ORDER — HYDROCODONE BITARTRATE AND ACETAMINOPHEN 5; 325 MG/1; MG/1
1 TABLET ORAL PRN
Status: DISCONTINUED | OUTPATIENT
Start: 2020-04-21 | End: 2020-04-21

## 2020-04-21 RX ORDER — MORPHINE SULFATE 2 MG/ML
1 INJECTION, SOLUTION INTRAMUSCULAR; INTRAVENOUS EVERY 5 MIN PRN
Status: DISCONTINUED | OUTPATIENT
Start: 2020-04-21 | End: 2020-04-21

## 2020-04-21 RX ADMIN — AMLODIPINE BESYLATE 5 MG: 5 TABLET ORAL at 18:10

## 2020-04-21 RX ADMIN — HYDROMORPHONE HYDROCHLORIDE 0.5 MG: 1 INJECTION, SOLUTION INTRAMUSCULAR; INTRAVENOUS; SUBCUTANEOUS at 14:46

## 2020-04-21 RX ADMIN — PREGABALIN 200 MG: 100 CAPSULE ORAL at 20:51

## 2020-04-21 RX ADMIN — HYDROMORPHONE HYDROCHLORIDE 0.5 MG: 1 INJECTION, SOLUTION INTRAMUSCULAR; INTRAVENOUS; SUBCUTANEOUS at 21:25

## 2020-04-21 RX ADMIN — CEFEPIME 2 G: 2 INJECTION, POWDER, FOR SOLUTION INTRAVENOUS at 05:12

## 2020-04-21 RX ADMIN — CEFEPIME 2 G: 2 INJECTION, POWDER, FOR SOLUTION INTRAVENOUS at 23:09

## 2020-04-21 RX ADMIN — CLONIDINE HYDROCHLORIDE 0.1 MG: 0.1 TABLET ORAL at 20:50

## 2020-04-21 RX ADMIN — SODIUM CHLORIDE: 9 INJECTION, SOLUTION INTRAVENOUS at 00:08

## 2020-04-21 RX ADMIN — MORPHINE SULFATE 2 MG: 2 INJECTION, SOLUTION INTRAMUSCULAR; INTRAVENOUS at 13:52

## 2020-04-21 RX ADMIN — FENTANYL CITRATE 50 MCG: 50 INJECTION, SOLUTION INTRAMUSCULAR; INTRAVENOUS at 12:50

## 2020-04-21 RX ADMIN — FENTANYL CITRATE 50 MCG: 50 INJECTION, SOLUTION INTRAMUSCULAR; INTRAVENOUS at 12:18

## 2020-04-21 RX ADMIN — PROPOFOL 50 MCG/KG/MIN: 10 INJECTION, EMULSION INTRAVENOUS at 12:18

## 2020-04-21 RX ADMIN — METOPROLOL TARTRATE 25 MG: 25 TABLET, FILM COATED ORAL at 10:02

## 2020-04-21 RX ADMIN — ONDANSETRON HYDROCHLORIDE 4 MG: 2 SOLUTION INTRAMUSCULAR; INTRAVENOUS at 20:32

## 2020-04-21 RX ADMIN — SODIUM CHLORIDE, PRESERVATIVE FREE 10 ML: 5 INJECTION INTRAVENOUS at 00:13

## 2020-04-21 RX ADMIN — MORPHINE SULFATE 2 MG: 2 INJECTION, SOLUTION INTRAMUSCULAR; INTRAVENOUS at 13:58

## 2020-04-21 RX ADMIN — CEFEPIME HYDROCHLORIDE 2 G: 2 INJECTION, POWDER, FOR SOLUTION INTRAVENOUS at 13:28

## 2020-04-21 RX ADMIN — HYDROMORPHONE HYDROCHLORIDE 0.5 MG: 1 INJECTION, SOLUTION INTRAMUSCULAR; INTRAVENOUS; SUBCUTANEOUS at 14:13

## 2020-04-21 RX ADMIN — MIDAZOLAM 2 MG: 1 INJECTION INTRAMUSCULAR; INTRAVENOUS at 12:06

## 2020-04-21 RX ADMIN — DAPTOMYCIN 650 MG: 500 INJECTION, POWDER, LYOPHILIZED, FOR SOLUTION INTRAVENOUS at 21:05

## 2020-04-21 RX ADMIN — DOCUSATE SODIUM 100 MG: 100 CAPSULE, LIQUID FILLED ORAL at 20:51

## 2020-04-21 RX ADMIN — Medication 10 ML: at 10:02

## 2020-04-21 RX ADMIN — MORPHINE SULFATE 2 MG: 2 INJECTION, SOLUTION INTRAMUSCULAR; INTRAVENOUS at 13:48

## 2020-04-21 RX ADMIN — INSULIN GLARGINE 15 UNITS: 100 INJECTION, SOLUTION SUBCUTANEOUS at 15:00

## 2020-04-21 RX ADMIN — LISINOPRIL 10 MG: 20 TABLET ORAL at 18:09

## 2020-04-21 RX ADMIN — HYDROMORPHONE HYDROCHLORIDE 0.5 MG: 1 INJECTION, SOLUTION INTRAMUSCULAR; INTRAVENOUS; SUBCUTANEOUS at 18:05

## 2020-04-21 RX ADMIN — MICONAZOLE NITRATE: 20.6 POWDER TOPICAL at 21:11

## 2020-04-21 RX ADMIN — INSULIN LISPRO 1 UNITS: 100 INJECTION, SOLUTION INTRAVENOUS; SUBCUTANEOUS at 20:41

## 2020-04-21 RX ADMIN — ONDANSETRON HYDROCHLORIDE 4 MG: 2 SOLUTION INTRAMUSCULAR; INTRAVENOUS at 11:19

## 2020-04-21 RX ADMIN — MORPHINE SULFATE 2 MG: 2 INJECTION, SOLUTION INTRAMUSCULAR; INTRAVENOUS at 11:19

## 2020-04-21 RX ADMIN — SODIUM CHLORIDE: 9 INJECTION, SOLUTION INTRAVENOUS at 12:06

## 2020-04-21 RX ADMIN — MORPHINE SULFATE 2 MG: 2 INJECTION, SOLUTION INTRAMUSCULAR; INTRAVENOUS at 14:03

## 2020-04-21 RX ADMIN — HYDROMORPHONE HYDROCHLORIDE 0.5 MG: 1 INJECTION, SOLUTION INTRAMUSCULAR; INTRAVENOUS; SUBCUTANEOUS at 14:35

## 2020-04-21 ASSESSMENT — PAIN DESCRIPTION - DESCRIPTORS
DESCRIPTORS: ACHING;CONSTANT;SHARP
DESCRIPTORS: CONSTANT;ACHING;BURNING
DESCRIPTORS: ACHING;SHOOTING;SHARP
DESCRIPTORS: ACHING;BURNING

## 2020-04-21 ASSESSMENT — PAIN DESCRIPTION - ORIENTATION
ORIENTATION: RIGHT;UPPER
ORIENTATION: RIGHT
ORIENTATION: RIGHT
ORIENTATION: RIGHT;UPPER

## 2020-04-21 ASSESSMENT — PULMONARY FUNCTION TESTS
PIF_VALUE: 0
PIF_VALUE: 1
PIF_VALUE: 0
PIF_VALUE: 1
PIF_VALUE: 0

## 2020-04-21 ASSESSMENT — PAIN SCALES - GENERAL
PAINLEVEL_OUTOF10: 9
PAINLEVEL_OUTOF10: 7
PAINLEVEL_OUTOF10: 8
PAINLEVEL_OUTOF10: 9
PAINLEVEL_OUTOF10: 0
PAINLEVEL_OUTOF10: 9
PAINLEVEL_OUTOF10: 0
PAINLEVEL_OUTOF10: 10
PAINLEVEL_OUTOF10: 10
PAINLEVEL_OUTOF10: 8
PAINLEVEL_OUTOF10: 8
PAINLEVEL_OUTOF10: 0
PAINLEVEL_OUTOF10: 10
PAINLEVEL_OUTOF10: 10

## 2020-04-21 ASSESSMENT — PAIN DESCRIPTION - PROGRESSION
CLINICAL_PROGRESSION: GRADUALLY IMPROVING
CLINICAL_PROGRESSION: NOT CHANGED
CLINICAL_PROGRESSION: GRADUALLY WORSENING

## 2020-04-21 ASSESSMENT — PAIN DESCRIPTION - LOCATION
LOCATION: LEG
LOCATION: HIP;LEG
LOCATION: LEG

## 2020-04-21 ASSESSMENT — PAIN - FUNCTIONAL ASSESSMENT
PAIN_FUNCTIONAL_ASSESSMENT: PREVENTS OR INTERFERES SOME ACTIVE ACTIVITIES AND ADLS
PAIN_FUNCTIONAL_ASSESSMENT: PREVENTS OR INTERFERES SOME ACTIVE ACTIVITIES AND ADLS

## 2020-04-21 ASSESSMENT — PAIN DESCRIPTION - ONSET
ONSET: ON-GOING
ONSET: ON-GOING
ONSET: GRADUAL

## 2020-04-21 ASSESSMENT — PAIN DESCRIPTION - PAIN TYPE
TYPE: ACUTE PAIN;SURGICAL PAIN
TYPE: SURGICAL PAIN

## 2020-04-21 ASSESSMENT — LIFESTYLE VARIABLES: SMOKING_STATUS: 1

## 2020-04-21 ASSESSMENT — ENCOUNTER SYMPTOMS: SHORTNESS OF BREATH: 0

## 2020-04-21 ASSESSMENT — PAIN DESCRIPTION - FREQUENCY
FREQUENCY: CONTINUOUS
FREQUENCY: CONTINUOUS

## 2020-04-21 NOTE — PROGRESS NOTES
Date: 2020       Patient Name: Reny Flores  : 1955      MRN: [id number]    Patient currently unavailable for physical therapy services as patient off the floor for procedure.    Will follow up as appropriate    Colletta Pong PT, DPT  [id number]

## 2020-04-21 NOTE — ANESTHESIA PRE PROCEDURE
Yes Juanito Kline, DO   cloNIDine (CATAPRES) 0.1 MG tablet TAKE 1 TABLET BY MOUTH 3 TIMES A DAY 2/4/20  Yes Juanito Kline, DO   LANTUS 100 UNIT/ML injection vial INJECT 15 UNITS INTO THE SKIN TWO TIMES A DAY 2/4/20  Yes Juanito Kline, DO   lubiprostone (AMITIZA) 24 MCG capsule Take 1 capsule by mouth 2 times daily (with meals) 1/20/20  Yes Juanito Kline DO   metoprolol (LOPRESSOR) 100 MG tablet TAKE 1 TABLET BY MOUTH 2 TIMES A DAY 1/8/20  Yes Juanito Kline, DO   insulin lispro (HUMALOG) 100 UNIT/ML injection vial Inject 5 Units into the skin 3 times daily (with meals) 11/11/19  Yes Juanito Kline, DO   ferrous sulfate 325 (65 Fe) MG tablet Take 1 tablet by mouth 2 times daily (with meals) 11/14/18  Yes Mikaela Ordonez MD   aspirin 81 MG chewable tablet Take 1 tablet by mouth daily 8/8/18  Yes Devang Green MD   lisinopril (PRINIVIL;ZESTRIL) 10 MG tablet Take 1 tablet by mouth daily Hold if sbp<140 8/7/18  Yes Devang Green MD   cilostazol (PLETAL) 100 MG tablet Take 100 mg by mouth 2 times daily   Yes Historical Provider, MD   pregabalin (LYRICA) 100 MG capsule Take 100 mg by mouth 2 times daily.     Yes Historical Provider, MD   spironolactone (ALDACTONE) 50 MG tablet Take 50 mg by mouth every morning    Yes Historical Provider, MD   atorvastatin (LIPITOR) 10 MG tablet Take 10 mg by mouth daily    Yes Historical Provider, MD   esomeprazole (NEXIUM) 40 MG delayed release capsule Take 40 mg by mouth every morning (before breakfast)   Yes Historical Provider, MD   ONE TOUCH ULTRA TEST strip USE TO TEST BLOOD SUGARS DAILY AS NEEDED 3/9/20   Juanito Kline, DO   Handicap Placard MISC by Does not apply route Patient cannot walk 200 ft without stopping to rest.    Expiration 10/21/2024 10/21/19   Juanito Kline, DO   LANCETS MICRO THIN 14G MISC 1 applicator by Does not apply route daily 6/14/19   Juanito Kline, DO   Insulin Syringe-Needle U-100 30G X 5/16\" 0.3 ML MISC 1 box by Does not apply route 5 times daily sodium (COLACE) capsule 100 mg  100 mg Oral BID Nicholas Her, DO   100 mg at 04/20/20 2110    DULoxetine (CYMBALTA) extended release capsule 120 mg  120 [facility] K[facility] DO   120 mg at 04/20/20 1240    pantoprazole (PROTONIX) tablet 40 mg  40 mg Oral QAM [facility] Kaden, DO   40 mg at 04/19/20 9989    ferrous sulfate (IRON 325) tablet 325 mg  325 mg Oral BID [facility] Kaden, DO   325 mg at 04/20/20 1821    insulin glargine (LANTUS) injection vial 15 Units  15 Units Subcutaneous BID Nicholas Her, DO   15 Units at 04/20/20 2112    lidocaine 4 % external patch 1 patch  1 patch Transdermal Daily Nicholas Her, DO        lisinopril (PRINIVIL;ZESTRIL) tablet 10 mg  10 mg Oral Daily Nicholas Her, DO   10 mg at 04/20/20 1240    lubiprostone (AMITIZA) capsule 24 mcg  24 mcg Oral BID  Nicholas Her, DO   24 mcg at 04/20/20 1821    metoprolol tartrate (LOPRESSOR) tablet 25 mg  25 mg Oral BID Nicholas Her, DO   25 mg at 04/21/20 1002    miconazole (MICOTIN) 2 % powder   Topical BID Nicholas Her, DO        pregabalin (LYRICA) capsule 200 mg  200 mg Oral TID Nicholas Her, DO   200 mg at 04/20/20 2110    senna (SENOKOT) tablet 8.6 mg  1 tablet Oral Daily Nicholas Her, DO   8.6 mg at 04/20/20 1239    spironolactone (ALDACTONE) tablet 50 mg  50 mg [facility] K[facility], DO   50 mg at 04/20/20 1240    tamsulosin (FLOMAX) capsule 0.4 mg  0.4 mg Oral Daily Nicholas Her, DO   0.4 mg at 04/20/20 1238    tiZANidine (ZANAFLEX) tablet 4 mg  4 mg Oral Q8H PRN Nicholas Her, DO        0.9 % sodium chloride infusion   Intravenous Continuous Nicholas Her, DO 75 mL/hr at 04/20/20 0405      acetaminophen (TYLENOL) tablet 650 mg  650 mg Oral Q6H PRN Nicholas Her, DO   650 mg at 04/16/20 2125    Or    acetaminophen (TYLENOL) suppository 650 mg  650 mg Rectal Q6H PRN Nicholas Her, DO        magnesium hydroxide (MILK OF MAGNESIA) 400

## 2020-04-21 NOTE — PROGRESS NOTES
Vascular Surgery Progress Note    Pt is being seen in f/u today regarding right lower extremity chronic stump wound, groin wound infection sp 3/20 R iliofemoral embolectomy, deep femoral embolectomy, 4/17 and 4/21 right lower extremity wound debridement     Subjective  Pt s/e. No issues overnight, pain better controlled.      Current Medications:    sodium chloride Stopped (04/21/20 0214)    sodium chloride 75 mL/hr at 04/20/20 0405    dextrose        oxyCODONE-acetaminophen **OR** oxyCODONE-acetaminophen, morphine, labetalol, sodium chloride flush, tiZANidine, acetaminophen **OR** acetaminophen, magnesium hydroxide, promethazine **OR** ondansetron, glucose, dextrose, glucagon (rDNA), dextrose    cefepime  2 g Intravenous Q8H    sodium hypochlorite   Irrigation Daily    sodium chloride flush  10 mL Intravenous 2 times per day    sodium chloride  25 mL Intravenous Q12H    amLODIPine  5 mg Oral Daily    aspirin  81 mg Oral Daily    atorvastatin  10 mg Oral Daily    cilostazol  100 mg Oral BID    cloNIDine  0.1 mg Oral BID    docusate sodium  100 mg Oral BID    DULoxetine  120 mg Oral QAM    pantoprazole  40 mg Oral QAM AC    ferrous sulfate  325 mg Oral BID WC    insulin glargine  15 Units Subcutaneous BID    lidocaine  1 patch Transdermal Daily    lisinopril  10 mg Oral Daily    lubiprostone  24 mcg Oral BID WC    metoprolol  25 mg Oral BID    miconazole   Topical BID    pregabalin  200 mg Oral TID    senna  1 tablet Oral Daily    spironolactone  50 mg Oral QAM    tamsulosin  0.4 mg Oral Daily    enoxaparin  40 mg Subcutaneous Daily    insulin lispro  0-6 Units Subcutaneous TID WC    insulin lispro  0-3 Units Subcutaneous Nightly        PHYSICAL EXAM:    BP (!) 162/91   Pulse 85   Temp 97.4 °F (36.3 °C) (Temporal)   Resp 20   Ht 6' 2\" (1.88 m)   Wt (!) 335 lb (152 kg)   SpO2 97%   BMI 43.01 kg/m²     Intake/Output Summary (Last 24 hours) at 4/21/2020 6450  Last data filed at

## 2020-04-21 NOTE — PROGRESS NOTES
[address] [place] [address] [facility]  JOEIDA  Progress Note      Chief Complaint   Patient presents with    Wound Infection     right above knee amputation, drainage x 1 week       SUBJECTIVE:  Patient seen and examined at bedside  Patient is awake and alert  He is very emotional and crying  He reports pain--- RN at bedside she is aware and she is about to give him the pain medication  He denies any fevers or chills  He denies any shortness of breath or chest pain  Patient is tolerating medications. No reported adverse drug reactions. No nausea, vomiting, diarrhea. He did report he vomited one time last night, no more after that. Right lower extremity chronic stump wound, groin wound infection sp 3/20 R iliofemoral embolectomy, deep femoral embolectomy, 4/17 right lower extremity extensive debridement down to the muscle of right upper thigh, 04/20 right thigh necrosis excisional debridement of muscle, subcutaneous tissue and skin       Review of systems:  As stated above in the chief complaint, otherwise negative.     Medications:  Scheduled Meds:   cefepime  2 g Intravenous Q8H    sodium hypochlorite   Irrigation Daily    sodium chloride flush  10 mL Intravenous 2 times per day    sodium chloride  25 mL Intravenous Q12H    amLODIPine  5 mg Oral Daily    aspirin  81 mg Oral Daily    atorvastatin  10 mg Oral Daily    cilostazol  100 mg Oral BID    cloNIDine  0.1 mg Oral BID    docusate sodium  100 mg Oral BID    DULoxetine  120 mg Oral QAM    pantoprazole  40 mg Oral QAM AC    ferrous sulfate  325 mg Oral BID WC    insulin glargine  15 Units Subcutaneous BID    lidocaine  1 patch Transdermal Daily    lisinopril  10 mg Oral Daily    lubiprostone  24 mcg Oral BID WC    metoprolol  25 mg Oral BID    miconazole   Topical BID    pregabalin  200 mg Oral TID    senna  1 tablet Oral Daily    spironolactone  50 mg Oral QAM    tamsulosin  0.4 mg Oral Daily    enoxaparin  40 mg Subcutaneous Daily    insulin lispro  0-6 Units Subcutaneous TID     insulin lispro  0-3 Units Subcutaneous Nightly     Continuous Infusions:   sodium chloride Stopped (20 0214)    sodium chloride 75 mL/hr at 20 0405    dextrose       PRN Meds:oxyCODONE-acetaminophen **OR** oxyCODONE-acetaminophen, morphine, labetalol, sodium chloride flush, tiZANidine, acetaminophen **OR** acetaminophen, magnesium hydroxide, promethazine **OR** ondansetron, glucose, dextrose, glucagon (rDNA), dextrose    OBJECTIVE:  BP (!) 144/77   Pulse 78   Temp 96.8 °F (36 °C) (Temporal)   Resp 18   Ht 6' 2\" (1.88 m)   Wt (!) 335 lb (152 kg)   SpO2 97%   BMI 43.01 kg/m²   Temp  Av.6 °F (36.4 °C)  Min: 96.8 °F (36 °C)  Max: 98.7 °F (37.1 °C)  Constitutional: The patient is awake, alert, and oriented. Skin: Warm and dry. No rashes were noted. Pre op pictures              Postop    HEENT: Round and reactive pupils. Moist mucous membranes. No ulcerations or thrush. Neck: Supple to movements. Chest: No use of accessory muscles to breathe. Symmetrical expansion. No wheezing, crackles or rhonchi. Cardiovascular: S1 and S2 are rhythmic and regular. No murmurs appreciated. Abdomen: Positive bowel sounds to auscultation. Benign to palpation. No masses felt. No hepatosplenomegaly. Genitourinary: Male  Extremities: No clubbing, no cyanosis, no edema.   AKA on the right with extensive exploration and debridement of the right thigh--dressed with Ace wrap  Lines: peripheral    Laboratory and Tests Review:  Lab Results   Component Value Date    WBC 9.7 2020    WBC 12.2 (H) 2020    WBC 13.5 (H) 2020    HGB 8.8 (L) 2020    HCT 27.9 (L) 2020    MCV 83.3 2020     2020     Lab Results   Component Value Date    NEUTROABS 6.30 2020    NEUTROABS 10.52 (H) 2020    NEUTROABS 19.26 (H) 2020     No results found for: [facility]  Lab Results   Component Value Date    ALT 92 (H) 04/16/2020    AST 57 (H) 04/16/2020    ALKPHOS 149 (H) 04/16/2020    BILITOT 0.4 04/16/2020     Lab Results   Component Value Date     04/20/2020    K 4.4 04/20/2020    K 5.3 04/17/2020     04/20/2020    CO2 23 04/20/2020    BUN 13 04/20/2020    CREATININE 1.2 04/20/2020    CREATININE 1.3 04/17/2020    CREATININE 1.3 04/16/2020    GFRAA >60 04/20/2020    LABGLOM >60 04/20/2020    GLUCOSE 95 04/20/2020    PROT 7.0 04/16/2020    LABALBU 2.6 04/16/2020    CALCIUM 8.4 04/20/2020    BILITOT 0.4 04/16/2020    ALKPHOS 149 04/16/2020    AST 57 04/16/2020    ALT 92 04/16/2020     Lab Results   Component Value Date    CRP 10.6 (H) 10/24/2018    CRP 2.1 (H) 06/25/2018    CRP 7.4 (H) 06/11/2018     Lab Results   Component Value Date    SEDRATE 128 (H) 10/24/2018    SEDRATE 77 (H) 06/25/2018    SEDRATE 138 (H) 06/11/2018     Radiology:      Microbiology:   Lab Results   Component Value Date    BC 24 Hours- no growth 04/16/2020    BC 5 Days- no growth 03/20/2020    BC 5 Days- no growth 10/26/2018    ORG Pseudomonas aeruginosa 04/17/2020    ORG Gram negative edis 04/17/2020    ORG Escherichia coli 04/17/2020     Lab Results   Component Value Date    BLOODCULT2 24 Hours- no growth 04/16/2020    BLOODCULT2 5 Days- no growth 03/20/2020    BLOODCULT2 5 Days- no growth 10/26/2018    ORG Pseudomonas aeruginosa 04/17/2020    ORG Gram negative edis 04/17/2020    ORG Escherichia coli 04/17/2020     WOUND/ABSCESS   Date Value Ref Range Status   04/16/2020 Heavy growth  Final   04/16/2020 Heavy growth  Final   04/16/2020 Heavy growth  Final     No results found for: RESPSMEAR  No results found for: MPNEUMO, CLAMYDCU, LABLEGI, AFBCX, FUNGSM, LABFUNG  No results found for: CULTRESP  No results found for: CXCATHTIP  No results found for: BFCS  Culture Surgical   Date Value Ref Range Status   04/17/2020 Heavy growth  Final   04/17/2020   Final    Heavy growth  Refer to previous culture for susceptibility results  of CXWND Right Leg collected 04/16/2020 at 15:02     04/17/2020   Final    Rare growth  Refer to previous culture for susceptibility results  of CXSUR Right Thigh Tissue collected 04/17/2020 at 09:10       Urine Culture, Routine   Date Value Ref Range Status   04/19/2020 Growth not present  Final   03/20/2020 <10,000 CFU/mL  Gram negative rods   (A)  Final   03/20/2020 <10,000 CFU/ml  Final   03/20/2020 <10,000 CFU/ml  Final     No results found for: [address] [place] [address]    Microbiology:  Urine 4/19/2020-growth not present  Blood cultures 4/16/2020-NGTD  Right thigh surgical cultures 4/17/2020- Pseudomonas aeruginosa, E. Coli, Klebsiella oxytoca    ASSESSMENT:  · Ischemic necrosis skin subcutaneous tissue and down fascia with cultures growing Pseudomonas aeruginosa, E. coli and Klebsiella oxytoca    PLAN:  · Continue cefepime IV 2 g every 8---Day-5  · Daptomycin was discontinued 04/20/2020  · Check final cultures  · Monitor labs  · Orthopedics, vascular, plastic surgery following  · Patient is going to OR today for re-eval and debridement with orthopedics and plastics, possible need for hip disarticulation in the near future. [employer]  11:27 AM  4/21/2020   Pt seen and examined. Above discussed agree with advanced practice nurse. Labs, cultures, and radiographs reviewed. Face to Face encounter occurred. Changes made as necessary.      Usha Gamez MD

## 2020-04-21 NOTE — ANESTHESIA POSTPROCEDURE EVALUATION
Department of Anesthesiology  Postprocedure Note    Patient: Jessica Brand  MRN: [id number]  YOB: 1955  Date of evaluation: 4/22/2020  Time:  7:33 AM     Procedure Summary     Date:  04/21/20 Room / Location:  [facility] OR 09 / [facility]    Anesthesia Start:   Anesthesia Stop:      Procedure:  RIGHT THIGH WOUND DRESSING CHANGE POSSIBLE  DEBRIDEMENT - NEEDS DRS. FRIAS / JANETTE TO SEE (Right ) Diagnosis:  (RIGHT THIGH NECROSIS)    Surgeon:  Carmela Zamarripa MD Responsible Provider:      Anesthesia Type:  MAC ASA Status:  4          Anesthesia Type: MAC    Blaine Phase I: Blaine Score: 9    Blaine Phase II:      Last vitals: Reviewed and per EMR flowsheets.        Anesthesia Post Evaluation    Patient location during evaluation: PACU  Patient participation: complete - patient participated  Level of consciousness: awake  Pain score: 3  Airway patency: patent  Nausea & Vomiting: no nausea and no vomiting  Complications: no  Cardiovascular status: blood pressure returned to baseline  Respiratory status: acceptable  Hydration status: euvolemic

## 2020-04-21 NOTE — PROGRESS NOTES
antibiotic therapy with  cefepime   Surgical cultures from right stump with Pseudomonas and E. coli, currently on appropriate antibiotics  infectious disease evaluation-appreciated  Vascular surgery to continue to follow as patient known to him from previous recent interventions  Resume home medications with appropriate dose adjustments  AC /at bedtime blood sugar checks with insulin sliding scale-adequately controlled  Adjust BP meds for accelerated hypertension-much better controlled today  A.m. labs  Adjust pain meds if needed     DVT Prophylaxis   PT/OT  Discharge planning       All consultants notes reviewed    Rigoberto Stoddard MD  11:39 AM  4/21/2020

## 2020-04-22 LAB
ALBUMIN SERPL-MCNC: 2.7 G/DL (ref 3.5–5.2)
ALP BLD-CCNC: 99 U/L (ref 40–129)
ALT SERPL-CCNC: 32 U/L (ref 0–40)
ANION GAP SERPL CALCULATED.3IONS-SCNC: 9 MMOL/L (ref 7–16)
AST SERPL-CCNC: 19 U/L (ref 0–39)
BASOPHILS ABSOLUTE: 0.05 E9/L (ref 0–0.2)
BASOPHILS RELATIVE PERCENT: 0.4 % (ref 0–2)
BILIRUB SERPL-MCNC: 0.3 MG/DL (ref 0–1.2)
BUN BLDV-MCNC: 12 MG/DL (ref 8–23)
CALCIUM SERPL-MCNC: 8.7 MG/DL (ref 8.6–10.2)
CHLORIDE BLD-SCNC: 102 MMOL/L (ref 98–107)
CO2: 26 MMOL/L (ref 22–29)
CREAT SERPL-MCNC: 1.3 MG/DL (ref 0.7–1.2)
EOSINOPHILS ABSOLUTE: 0.17 E9/L (ref 0.05–0.5)
EOSINOPHILS RELATIVE PERCENT: 1.4 % (ref 0–6)
GFR AFRICAN AMERICAN: >60
GFR NON-AFRICAN AMERICAN: >60 ML/MIN/1.73
GLUCOSE BLD-MCNC: 137 MG/DL (ref 74–99)
GRAM STAIN ORDERABLE: NORMAL
HCT VFR BLD CALC: 27.6 % (ref 37–54)
HEMOGLOBIN: 8.8 G/DL (ref 12.5–16.5)
IMMATURE GRANULOCYTES #: 0.16 E9/L
IMMATURE GRANULOCYTES %: 1.3 % (ref 0–5)
LYMPHOCYTES ABSOLUTE: 2.12 E9/L (ref 1.5–4)
LYMPHOCYTES RELATIVE PERCENT: 17.8 % (ref 20–42)
MCH RBC QN AUTO: 26.3 PG (ref 26–35)
MCHC RBC AUTO-ENTMCNC: 31.9 % (ref 32–34.5)
MCV RBC AUTO: 82.4 FL (ref 80–99.9)
METER GLUCOSE: 139 MG/DL (ref 74–99)
METER GLUCOSE: 165 MG/DL (ref 74–99)
METER GLUCOSE: 186 MG/DL (ref 74–99)
METER GLUCOSE: 186 MG/DL (ref 74–99)
MONOCYTES ABSOLUTE: 0.92 E9/L (ref 0.1–0.95)
MONOCYTES RELATIVE PERCENT: 7.7 % (ref 2–12)
NEUTROPHILS ABSOLUTE: 8.46 E9/L (ref 1.8–7.3)
NEUTROPHILS RELATIVE PERCENT: 71.4 % (ref 43–80)
PDW BLD-RTO: 14.5 FL (ref 11.5–15)
PLATELET # BLD: 332 E9/L (ref 130–450)
PMV BLD AUTO: 10.6 FL (ref 7–12)
POTASSIUM SERPL-SCNC: 4.4 MMOL/L (ref 3.5–5)
PROCALCITONIN: 0.27 NG/ML (ref 0–0.08)
RBC # BLD: 3.35 E12/L (ref 3.8–5.8)
SODIUM BLD-SCNC: 137 MMOL/L (ref 132–146)
TOTAL PROTEIN: 6.4 G/DL (ref 6.4–8.3)
WBC # BLD: 11.9 E9/L (ref 4.5–11.5)

## 2020-04-22 PROCEDURE — 2580000003 HC RX 258: Performed by: STUDENT IN AN ORGANIZED HEALTH CARE EDUCATION/TRAINING PROGRAM

## 2020-04-22 PROCEDURE — 6370000000 HC RX 637 (ALT 250 FOR IP): Performed by: STUDENT IN AN ORGANIZED HEALTH CARE EDUCATION/TRAINING PROGRAM

## 2020-04-22 PROCEDURE — 36592 COLLECT BLOOD FROM PICC: CPT

## 2020-04-22 PROCEDURE — 6360000002 HC RX W HCPCS: Performed by: STUDENT IN AN ORGANIZED HEALTH CARE EDUCATION/TRAINING PROGRAM

## 2020-04-22 PROCEDURE — 97530 THERAPEUTIC ACTIVITIES: CPT

## 2020-04-22 PROCEDURE — 2580000003 HC RX 258: Performed by: SPECIALIST

## 2020-04-22 PROCEDURE — 82962 GLUCOSE BLOOD TEST: CPT

## 2020-04-22 PROCEDURE — 97166 OT EVAL MOD COMPLEX 45 MIN: CPT

## 2020-04-22 PROCEDURE — 36415 COLL VENOUS BLD VENIPUNCTURE: CPT

## 2020-04-22 PROCEDURE — 94660 CPAP INITIATION&MGMT: CPT

## 2020-04-22 PROCEDURE — 97162 PT EVAL MOD COMPLEX 30 MIN: CPT

## 2020-04-22 PROCEDURE — 6360000002 HC RX W HCPCS: Performed by: SPECIALIST

## 2020-04-22 PROCEDURE — 1200000000 HC SEMI PRIVATE

## 2020-04-22 PROCEDURE — 85025 COMPLETE CBC W/AUTO DIFF WBC: CPT

## 2020-04-22 PROCEDURE — 80053 COMPREHEN METABOLIC PANEL: CPT

## 2020-04-22 PROCEDURE — 84145 PROCALCITONIN (PCT): CPT

## 2020-04-22 RX ORDER — OXYCODONE HYDROCHLORIDE 10 MG/1
10 TABLET ORAL EVERY 4 HOURS PRN
Status: DISCONTINUED | OUTPATIENT
Start: 2020-04-22 | End: 2020-05-04 | Stop reason: HOSPADM

## 2020-04-22 RX ORDER — POLYETHYLENE GLYCOL 3350 17 G/17G
17 POWDER, FOR SOLUTION ORAL DAILY PRN
Status: DISCONTINUED | OUTPATIENT
Start: 2020-04-22 | End: 2020-04-24

## 2020-04-22 RX ORDER — ACETAMINOPHEN 325 MG/1
650 TABLET ORAL EVERY 6 HOURS
Status: DISCONTINUED | OUTPATIENT
Start: 2020-04-22 | End: 2020-04-25

## 2020-04-22 RX ORDER — OXYCODONE HYDROCHLORIDE 5 MG/1
5 TABLET ORAL EVERY 4 HOURS PRN
Status: DISCONTINUED | OUTPATIENT
Start: 2020-04-22 | End: 2020-05-04 | Stop reason: HOSPADM

## 2020-04-22 RX ADMIN — FERROUS SULFATE TAB 325 MG (65 MG ELEMENTAL FE) 325 MG: 325 (65 FE) TAB at 08:47

## 2020-04-22 RX ADMIN — LISINOPRIL 10 MG: 20 TABLET ORAL at 08:46

## 2020-04-22 RX ADMIN — CEFEPIME 2 G: 2 INJECTION, POWDER, FOR SOLUTION INTRAVENOUS at 22:45

## 2020-04-22 RX ADMIN — INSULIN LISPRO 1 UNITS: 100 INJECTION, SOLUTION INTRAVENOUS; SUBCUTANEOUS at 20:56

## 2020-04-22 RX ADMIN — OXYCODONE HYDROCHLORIDE 10 MG: 10 TABLET ORAL at 18:06

## 2020-04-22 RX ADMIN — PREGABALIN 200 MG: 100 CAPSULE ORAL at 13:31

## 2020-04-22 RX ADMIN — CILOSTAZOL 100 MG: 100 TABLET ORAL at 20:56

## 2020-04-22 RX ADMIN — DOCUSATE SODIUM 100 MG: 100 CAPSULE, LIQUID FILLED ORAL at 09:22

## 2020-04-22 RX ADMIN — FERROUS SULFATE TAB 325 MG (65 MG ELEMENTAL FE) 325 MG: 325 (65 FE) TAB at 17:23

## 2020-04-22 RX ADMIN — PREGABALIN 200 MG: 100 CAPSULE ORAL at 08:46

## 2020-04-22 RX ADMIN — ASPIRIN 81 MG 81 MG: 81 TABLET ORAL at 09:22

## 2020-04-22 RX ADMIN — ATORVASTATIN CALCIUM 10 MG: 10 TABLET, FILM COATED ORAL at 09:22

## 2020-04-22 RX ADMIN — INSULIN GLARGINE 15 UNITS: 100 INJECTION, SOLUTION SUBCUTANEOUS at 20:56

## 2020-04-22 RX ADMIN — LUBIPROSTONE 24 MCG: 24 CAPSULE, GELATIN COATED ORAL at 09:24

## 2020-04-22 RX ADMIN — HYDROMORPHONE HYDROCHLORIDE 0.5 MG: 1 INJECTION, SOLUTION INTRAMUSCULAR; INTRAVENOUS; SUBCUTANEOUS at 09:16

## 2020-04-22 RX ADMIN — HYDROMORPHONE HYDROCHLORIDE 0.5 MG: 1 INJECTION, SOLUTION INTRAMUSCULAR; INTRAVENOUS; SUBCUTANEOUS at 00:59

## 2020-04-22 RX ADMIN — HYOSCYAMINE SULFATE: 16 SOLUTION at 08:57

## 2020-04-22 RX ADMIN — CILOSTAZOL 100 MG: 100 TABLET ORAL at 08:46

## 2020-04-22 RX ADMIN — HYDROMORPHONE HYDROCHLORIDE 0.5 MG: 1 INJECTION, SOLUTION INTRAMUSCULAR; INTRAVENOUS; SUBCUTANEOUS at 12:09

## 2020-04-22 RX ADMIN — HYDROMORPHONE HYDROCHLORIDE 0.5 MG: 1 INJECTION, SOLUTION INTRAMUSCULAR; INTRAVENOUS; SUBCUTANEOUS at 06:02

## 2020-04-22 RX ADMIN — DAPTOMYCIN 650 MG: 500 INJECTION, POWDER, LYOPHILIZED, FOR SOLUTION INTRAVENOUS at 20:57

## 2020-04-22 RX ADMIN — DOCUSATE SODIUM 100 MG: 100 CAPSULE, LIQUID FILLED ORAL at 20:56

## 2020-04-22 RX ADMIN — STANDARDIZED SENNA CONCENTRATE 8.6 MG: 8.6 TABLET ORAL at 09:22

## 2020-04-22 RX ADMIN — HYDROMORPHONE HYDROCHLORIDE 0.5 MG: 1 INJECTION, SOLUTION INTRAMUSCULAR; INTRAVENOUS; SUBCUTANEOUS at 19:44

## 2020-04-22 RX ADMIN — TAMSULOSIN HYDROCHLORIDE 0.4 MG: 0.4 CAPSULE ORAL at 09:22

## 2020-04-22 RX ADMIN — INSULIN LISPRO 1 UNITS: 100 INJECTION, SOLUTION INTRAVENOUS; SUBCUTANEOUS at 17:23

## 2020-04-22 RX ADMIN — AMLODIPINE BESYLATE 5 MG: 5 TABLET ORAL at 08:46

## 2020-04-22 RX ADMIN — DULOXETINE HYDROCHLORIDE 120 MG: 60 CAPSULE, DELAYED RELEASE ORAL at 09:21

## 2020-04-22 RX ADMIN — INSULIN LISPRO 1 UNITS: 100 INJECTION, SOLUTION INTRAVENOUS; SUBCUTANEOUS at 12:03

## 2020-04-22 RX ADMIN — INSULIN GLARGINE 15 UNITS: 100 INJECTION, SOLUTION SUBCUTANEOUS at 08:47

## 2020-04-22 RX ADMIN — CLONIDINE HYDROCHLORIDE 0.1 MG: 0.1 TABLET ORAL at 20:56

## 2020-04-22 RX ADMIN — SPIRONOLACTONE 50 MG: 25 TABLET ORAL at 08:47

## 2020-04-22 RX ADMIN — CEFEPIME 2 G: 2 INJECTION, POWDER, FOR SOLUTION INTRAVENOUS at 13:31

## 2020-04-22 RX ADMIN — HYDROMORPHONE HYDROCHLORIDE 0.5 MG: 1 INJECTION, SOLUTION INTRAMUSCULAR; INTRAVENOUS; SUBCUTANEOUS at 23:15

## 2020-04-22 RX ADMIN — ONDANSETRON HYDROCHLORIDE 4 MG: 2 SOLUTION INTRAMUSCULAR; INTRAVENOUS at 09:15

## 2020-04-22 RX ADMIN — CLONIDINE HYDROCHLORIDE 0.1 MG: 0.1 TABLET ORAL at 08:46

## 2020-04-22 RX ADMIN — OXYCODONE HYDROCHLORIDE 10 MG: 10 TABLET ORAL at 13:32

## 2020-04-22 RX ADMIN — CEFEPIME 2 G: 2 INJECTION, POWDER, FOR SOLUTION INTRAVENOUS at 08:47

## 2020-04-22 RX ADMIN — LUBIPROSTONE 24 MCG: 24 CAPSULE, GELATIN COATED ORAL at 17:23

## 2020-04-22 RX ADMIN — MICONAZOLE NITRATE: 20.6 POWDER TOPICAL at 09:25

## 2020-04-22 RX ADMIN — HYDROMORPHONE HYDROCHLORIDE 0.5 MG: 1 INJECTION, SOLUTION INTRAMUSCULAR; INTRAVENOUS; SUBCUTANEOUS at 04:12

## 2020-04-22 RX ADMIN — ACETAMINOPHEN 650 MG: 325 TABLET, FILM COATED ORAL at 09:16

## 2020-04-22 RX ADMIN — METOPROLOL TARTRATE 25 MG: 25 TABLET, FILM COATED ORAL at 09:22

## 2020-04-22 RX ADMIN — PREGABALIN 200 MG: 100 CAPSULE ORAL at 20:56

## 2020-04-22 RX ADMIN — ENOXAPARIN SODIUM 40 MG: 100 INJECTION SUBCUTANEOUS at 08:46

## 2020-04-22 RX ADMIN — METOPROLOL TARTRATE 25 MG: 25 TABLET, FILM COATED ORAL at 20:56

## 2020-04-22 RX ADMIN — OXYCODONE HYDROCHLORIDE 10 MG: 10 TABLET ORAL at 22:13

## 2020-04-22 ASSESSMENT — PAIN SCALES - GENERAL
PAINLEVEL_OUTOF10: 9
PAINLEVEL_OUTOF10: 8
PAINLEVEL_OUTOF10: 7
PAINLEVEL_OUTOF10: 6
PAINLEVEL_OUTOF10: 4
PAINLEVEL_OUTOF10: 10
PAINLEVEL_OUTOF10: 6
PAINLEVEL_OUTOF10: 3
PAINLEVEL_OUTOF10: 8
PAINLEVEL_OUTOF10: 8
PAINLEVEL_OUTOF10: 9
PAINLEVEL_OUTOF10: 9
PAINLEVEL_OUTOF10: 8
PAINLEVEL_OUTOF10: 7
PAINLEVEL_OUTOF10: 8
PAINLEVEL_OUTOF10: 9

## 2020-04-22 ASSESSMENT — PAIN DESCRIPTION - ORIENTATION: ORIENTATION: RIGHT

## 2020-04-22 ASSESSMENT — PAIN DESCRIPTION - PROGRESSION: CLINICAL_PROGRESSION: NOT CHANGED

## 2020-04-22 ASSESSMENT — PAIN DESCRIPTION - FREQUENCY: FREQUENCY: CONTINUOUS

## 2020-04-22 ASSESSMENT — PAIN DESCRIPTION - LOCATION: LOCATION: HIP;LEG

## 2020-04-22 ASSESSMENT — PAIN DESCRIPTION - ONSET: ONSET: ON-GOING

## 2020-04-22 ASSESSMENT — PAIN DESCRIPTION - PAIN TYPE: TYPE: SURGICAL PAIN

## 2020-04-22 ASSESSMENT — PAIN DESCRIPTION - DESCRIPTORS: DESCRIPTORS: ACHING;CONSTANT;DISCOMFORT

## 2020-04-22 NOTE — PLAN OF CARE
Problem: Falls - Risk of:  Goal: Will remain free from falls  Description: Will remain free from falls  4/22/2020 0506 by Guzman Higuera RN  Outcome: Met This Shift  Goal: Absence of physical injury  Description: Absence of physical injury  4/22/2020 0506 by Guzman Higuera RN  Outcome: Met This Shift     Problem: Skin Integrity:  Goal: Will show no infection signs and symptoms  Description: Will show no infection signs and symptoms  4/22/2020 0506 by Guzman Higuera RN  Outcome: Met This Shift  Goal: Absence of new skin breakdown  Description: Absence of new skin breakdown  4/22/2020 0506 by Guzman Higuera RN  Outcome: Met This Shift     Problem: Pain:  Goal: Pain level will decrease  Description: Pain level will decrease  4/22/2020 1613 by Audra Kelly RN  Outcome: Ongoing  4/22/2020 0948 by Audra Kelly RN  Outcome: Ongoing  4/22/2020 0506 by Guzman Higuera RN  Outcome: Ongoing  Goal: Control of acute pain  Description: Control of acute pain  4/22/2020 1613 by Audra Kelly RN  Outcome: Ongoing  4/22/2020 0948 by Audra Kelly RN  Outcome: Ongoing  4/22/2020 0506 by Guzman Higuera RN  Outcome: Ongoing  Goal: Control of chronic pain  Description: Control of chronic pain  4/22/2020 1613 by Audra Kelly RN  Outcome: Ongoing  4/22/2020 0948 by Audra Kelly RN  Outcome: Ongoing  4/22/2020 0506 by Guzman Higuera RN  Outcome: Ongoing

## 2020-04-22 NOTE — PROGRESS NOTES
Date: 4/21/2020    Time: 10:28 PM    Patient Placed On BIPAP/CPAP/ Non-Invasive Ventilation? Yes    If no must comment. Facial area red/color change? No           If YES are Blister/Lesion present? No   If yes must notify nursing staff  BIPAP/CPAP skin barrier?   Yes    Skin barrier type:duoderm       Comments:        Griselda Rouse

## 2020-04-22 NOTE — PLAN OF CARE
Problem: Falls - Risk of:  Goal: Will remain free from falls  Description: Will remain free from falls  Outcome: Met This Shift  Goal: Absence of physical injury  Description: Absence of physical injury  Outcome: Met This Shift     Problem: Skin Integrity:  Goal: Will show no infection signs and symptoms  Description: Will show no infection signs and symptoms  Outcome: Met This Shift  Goal: Absence of new skin breakdown  Description: Absence of new skin breakdown  Outcome: Met This Shift     Problem: Pain:  Goal: Pain level will decrease  Description: Pain level will decrease  Outcome: Ongoing  Goal: Control of acute pain  Description: Control of acute pain  Outcome: Ongoing  Goal: Control of chronic pain  Description: Control of chronic pain  Outcome: Ongoing

## 2020-04-22 NOTE — PROGRESS NOTES
Subjective: The patient is awake and alert. No acute events overnight. Denies chest pain, angina, SOB   Pain controlled   tolerating po intake   No bm yet     Objective:    BP (!) 164/76   Pulse 79   Temp 96.8 °F (36 °C) (Temporal)   Resp 16   Ht 6' 2\" (1.88 m)   Wt (!) 335 lb (152 kg)   SpO2 97%   BMI 43.01 kg/m²     In: 480 [P.O.:480]  Out: 1600     HEENT: NCAT,  PERRLA, No JVD  Heart:  RRR, no murmurs, gallops, or rubs.   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)    Coagulopathy (HCC)    Moderate protein-calorie malnutrition (HCC)    PVD (peripheral vascular disease) (HCC)    Leukocytosis    Stage 3 chronic kidney disease (HCC)    Tobacco dependence    HLD (hyperlipidemia)    History of DVT (deep vein thrombosis)    Osteomyelitis (HCC)    S/P AKA (above knee amputation), right (HCC)    History of vascular surgery    Occlusion of common femoral artery (HCC)    Cellulitis, scrotum    Hyperglycemia due to type 2 diabetes mellitus ([address]    Critical lower limb ischemia    Gas gangrene of thigh ([address]    Vascular occlusion    Wound infection    Postoperative wound infection    Ischemic ulcer of right thigh with fat layer exposed ([address]    Ischemic ulcer of right thigh with necrosis of muscle ([address]       Plan:    Admit to telemetry for evaluation of right thigh stump abscess-unhealed  S/p

## 2020-04-22 NOTE — PROGRESS NOTES
Physical Therapy    Physical Therapy Initial Assessment     Name: Juan Donald  : 1955  MRN: [id number]    Referring Provider:  Julio Cesar Parikh MD    Date of Service: 2020    Evaluating PT:  Jorge Burgos PT, DPT PT [id number]    Room #:  7082/7617-J  Diagnosis:  Wound Infection  PMHx/PSHx:  Legionnaire's Disease, DM, HTN, DVT, PVD, Critical Limb Ischemia, AKA 2018  Procedure/Surgery:    R Leg I&D 2020  R Thigh Wound Dressing Change, Debridement, Removal of Muscle 2020  Excisional debridement of the infected necrotic wound right groin and right thigh, including skin, subcu fascia and muscle 2020    Precautions:  R AKA, Falls, Contact (ESBL)  Equipment Needs:  TBD    SUBJECTIVE:    Pt admitted from SNF, reports he was not able to start PT at SNF due to medical complications. Pt states he was performing stand pivot transfers with prosthesis donned no AD independently PTA. Pt states he used w/c as primarily for mobility. Equipment Owned:  W/c Manual; Prosthesis     OBJECTIVE:   Initial Evaluation  Date: 2020 Treatment Short Term/ Long Term   Goals   AM-PAC 6 Clicks 72/17     Was pt agreeable to Eval/treatment? Yes     Does pt have pain? 10/10 pain R residual limb     Bed Mobility  Rolling: SBA  Supine to sit: SBA using bed rail  Sit to supine: SBA  Scooting: SBA  Rolling: Independent  Supine to sit: Independent  Sit to supine: Independent  Scooting: Independent     Transfers Sit to stand: NT- declined  Stand to sit: NT  Stand pivot: NT  Sit to stand: Mila  Stand to sit: Mila  Stand pivot: Mila   Ambulation    NT     Stair negotiation: ascended and descended  NA     ROM BUE:  See OT Note  BLE:  WFL     Strength BUE:  See OT Note  RLE: 3+/5 (R AKA)  LLE: 3+/5   Improve Strength 1/3 MMT Grade   Balance Sitting EOB:  Supervision  Dynamic Standing:  NT  Sitting EOB:  Independent    Dynamic Standing:  Mila AAD     Pt is A & O x 4   Sensation:  Decreased RLE  Edema:   WNL    Vitals:  HR 75  Spo2 97%  PRE  HR 75  Spo2 97%  POST    Patient education  Pt educated on role of PT    Patient response to education:   Pt verbalized understanding Pt demonstrated skill Pt requires further education in this area   x x x     ASSESSMENT:    Comments:  Pt received in supine agreeable to PT evaluation. Pt requiring increased time with mobility due to strength deficits. Pt sitting statically ~ 10 min without physical assistance. Pt denies dizziness, vitals monitored throughout. Pt firmly declining to perform functional transfers. Pt educated on benefits of mobilization. Pt required no assistance to perform sit to supine transfer. Pt positioned optimally to prevent contracture or skin breakdown. Patient would benefit from continued skilled PT to maximize functional mobility independence. Treatment:  Patient practiced and was instructed in the following treatment:     Bed mobility- cues to facilitate independence    Pt's/ family goals   1. Get better    Patient and or family understand(s) diagnosis, prognosis, and plan of care. yes    PLAN:    PT care will be provided in accordance with the objectives noted above. Exercises and functional mobility practice will be used as well as appropriate assistive devices or modalities to obtain goals. Patient and family education will also be administered as needed. Frequency of treatments: 2-5 x/week x 1-2 weeks. Time in  1257  Time out  1320    Total Treatment Time  8 minutes     Evaluation Time includes thorough review of current medical information, gathering information on past medical history/social history and prior level of function, completion of standardized testing/informal observation of tasks, assessment of data and education on plan of care and goals.     CPT codes:  [] Low Complexity PT evaluation 97915  [x] Moderate Complexity PT evaluation 51982  [] High Complexity PT evaluation 89565  [] PT Re-evaluation 45828  [] Gait training 88382 0 minutes  []

## 2020-04-22 NOTE — PROGRESS NOTES
fair      Functional Assessment:   Initial Eval Status  Date: 4/22/20 Treatment Status  Date: STGs/LTGs  Treatment frequency: 1-4x/wk   Feeding Independent       Grooming Stand by Assist (seated EOB)  Modified [place]    UB Dressing Minimal Assist (simulated EOB-pt refusing)  Modified [place]    LB Dressing Minimal Assist (socks)  Modified [place]    Bathing Mod. Assist (simulated)  Modified [place]    Toileting NT   Modified [place]    Bed Mobility  Supine to sit: Stand by Assist   Sit to supine: Stand by Assist   Supine to sit: Mod. I   Sit to supine: Mod. I    Functional Transfers NT (pt refusing)  Modified [place] w/ AD if needed   Functional Mobility NT    TBD   Balance Sitting:     Static:  Sup. Dynamic:SBA  Standing: NT         Activity Tolerance Fair-  good   Visual/  Perceptual Glasses: Yes                  Hand dominance: ?? Strength ROM Additional Info:    RUE  4/5  WFL good  and wfl FMC/dexterity noted during ADL tasks       LUE 4/5  WFL good  and wfl FMC/dexterity noted during ADL tasks         Hearing: [employer] / [facility]   Sensation:  Pt c/o numbness/tingling in R leg  Tone: WFL   Edema: none noted            Treatment: Upon arrival, patient lying in bed and agreeable to OT session at this time with motivation. Therapist facilitated bed mobility and sitting tolerance task (~10 mins to increase independence in ADLs) - skilled cuing on hand placement, posture, body mechanics and safety. Therapist facilitated self-care retraining: UB/LB self-care tasks(observed pt fix sock on L leg-pt denying to do any ADL tasks this session) and seated grooming task(simulated EOB) while educating pt on modified techniques, posture, safety and energy conservation techniques. Skilled monitoring of HR, O2 sats and pts response to treatment. At end of session, patient lying in bed with call light and phone within reach, all lines and tubes intact.                       Comments:  Overall pt Treatment Charges: Mins Units   Ther Ex  [id number]     Manual Therapy Bettye Elizabeth [phone]   ADL/Home Mgt [id number]     Neuro Re-ed [id number]     Group Therapy      Orthotic manage/training  49967     Non-Billable Time     Total Timed Treatment 10 1715 [facility], OTR/L #[id number]

## 2020-04-22 NOTE — CARE COORDINATION
4/22/2020 SOCIAL WORK TRANSITION OF CARE PLANNING  Sw spoke with [facility].. Pt is not a bed hold, but they would like to accept back (pending bed availability). Sw will follow.   Electronically signed by ARMEN Shah on 4/22/2020 at 10:26 AM

## 2020-04-22 NOTE — PROGRESS NOTES
335 lb (152 kg)   SpO2 99%   BMI 43.01 kg/m²     Intake/Output Summary (Last 24 hours) at 4/22/2020 5044  Last data filed at 4/22/2020 6066  Gross per 24 hour   Intake 690 ml   Output 2825 ml   Net -2135 ml          Gen: awake, alert and oriented x3, no apparent distress  CVS: RRR  Resp: No increased work of breathing  Abd: Soft, non-tender, non-distended  R LE: Large right lower extremity wound down to muscle anteriorly less foul smelling and no purulent drainage, right groin wound with fibrinous tissue and no purulent drainage    LABS:    Lab Results   Component Value Date    WBC 11.9 (H) 04/22/2020    HGB 8.8 (L) 04/22/2020    HCT 27.6 (L) 04/22/2020     04/22/2020    PROTIME 18.4 (H) 04/16/2020    INR 1.6 04/16/2020    APTT 31.6 04/16/2020    K 4.4 04/20/2020    BUN 13 04/20/2020    CREATININE 1.2 04/20/2020       A/P  60 y.o. male with right lower extremity chronic stump wound, groin wound infection sp 3/20 R iliofemoral embolectomy, deep femoral embolectomy, 4/17, 4/21, 4/22 Right lower extremity wound debridement     Diabetic diet as tolerated   [email or web address]  Will change pain regimen- sched tylenol, PRN roxicodone, PRN dilaudid  ID consult appreciated- continue daptomycin and cefepime  Plan for debridement, possible revision, possible wound vac in OR tomorrow    Electronically signed by Mark Mercer DO on 4/22/2020 at 7:14 AM    Pt seen and examined    Or planned for tommorow    I reviewed the procedure with the patient and family as available. I discussed the procedure, risks, benefits, complications, and alternatives of the procedure. They understand and consent.   All questions were answered      Sasha Osuna

## 2020-04-23 ENCOUNTER — ANESTHESIA EVENT (OUTPATIENT)
Dept: OPERATING ROOM | Age: 65
DRG: 463 | End: 2020-04-23
Payer: COMMERCIAL

## 2020-04-23 ENCOUNTER — ANESTHESIA (OUTPATIENT)
Dept: OPERATING ROOM | Age: 65
DRG: 463 | End: 2020-04-23
Payer: COMMERCIAL

## 2020-04-23 VITALS — TEMPERATURE: 96.4 F | DIASTOLIC BLOOD PRESSURE: 95 MMHG | SYSTOLIC BLOOD PRESSURE: 198 MMHG | OXYGEN SATURATION: 100 %

## 2020-04-23 LAB
ANAEROBIC CULTURE: NORMAL
BLOOD BANK DISPENSE STATUS: NORMAL
BLOOD BANK PRODUCT CODE: NORMAL
BPU ID: NORMAL
DESCRIPTION BLOOD BANK: NORMAL
HCT VFR BLD CALC: 27.7 % (ref 37–54)
HEMOGLOBIN: 8.9 G/DL (ref 12.5–16.5)
METER GLUCOSE: 170 MG/DL (ref 74–99)
METER GLUCOSE: 217 MG/DL (ref 74–99)
METER GLUCOSE: 231 MG/DL (ref 74–99)

## 2020-04-23 PROCEDURE — 85014 HEMATOCRIT: CPT

## 2020-04-23 PROCEDURE — 6360000002 HC RX W HCPCS: Performed by: ANESTHESIOLOGIST ASSISTANT

## 2020-04-23 PROCEDURE — 3700000000 HC ANESTHESIA ATTENDED CARE: Performed by: SURGERY

## 2020-04-23 PROCEDURE — 1200000000 HC SEMI PRIVATE

## 2020-04-23 PROCEDURE — 3600000002 HC SURGERY LEVEL 2 BASE: Performed by: SURGERY

## 2020-04-23 PROCEDURE — 6370000000 HC RX 637 (ALT 250 FOR IP): Performed by: STUDENT IN AN ORGANIZED HEALTH CARE EDUCATION/TRAINING PROGRAM

## 2020-04-23 PROCEDURE — 2580000003 HC RX 258: Performed by: ANESTHESIOLOGIST ASSISTANT

## 2020-04-23 PROCEDURE — 6360000002 HC RX W HCPCS: Performed by: STUDENT IN AN ORGANIZED HEALTH CARE EDUCATION/TRAINING PROGRAM

## 2020-04-23 PROCEDURE — 36430 TRANSFUSION BLD/BLD COMPNT: CPT

## 2020-04-23 PROCEDURE — 11046 DBRDMT MUSC&/FSCA EA ADDL: CPT | Performed by: SURGERY

## 2020-04-23 PROCEDURE — 6360000002 HC RX W HCPCS: Performed by: ANESTHESIOLOGY

## 2020-04-23 PROCEDURE — 2580000003 HC RX 258: Performed by: STUDENT IN AN ORGANIZED HEALTH CARE EDUCATION/TRAINING PROGRAM

## 2020-04-23 PROCEDURE — 0KBQ0ZZ EXCISION OF RIGHT UPPER LEG MUSCLE, OPEN APPROACH: ICD-10-PCS | Performed by: SURGERY

## 2020-04-23 PROCEDURE — 2500000003 HC RX 250 WO HCPCS: Performed by: ANESTHESIOLOGIST ASSISTANT

## 2020-04-23 PROCEDURE — 36415 COLL VENOUS BLD VENIPUNCTURE: CPT

## 2020-04-23 PROCEDURE — 7100000001 HC PACU RECOVERY - ADDTL 15 MIN: Performed by: SURGERY

## 2020-04-23 PROCEDURE — 11043 DBRDMT MUSC&/FSCA 1ST 20/<: CPT | Performed by: SURGERY

## 2020-04-23 PROCEDURE — 85018 HEMOGLOBIN: CPT

## 2020-04-23 PROCEDURE — 3700000001 HC ADD 15 MINUTES (ANESTHESIA): Performed by: SURGERY

## 2020-04-23 PROCEDURE — 82962 GLUCOSE BLOOD TEST: CPT

## 2020-04-23 PROCEDURE — 7100000000 HC PACU RECOVERY - FIRST 15 MIN: Performed by: SURGERY

## 2020-04-23 PROCEDURE — 2700000000 HC OXYGEN THERAPY PER DAY

## 2020-04-23 PROCEDURE — 3600000012 HC SURGERY LEVEL 2 ADDTL 15MIN: Performed by: SURGERY

## 2020-04-23 PROCEDURE — 94660 CPAP INITIATION&MGMT: CPT

## 2020-04-23 RX ORDER — PROPOFOL 10 MG/ML
INJECTION, EMULSION INTRAVENOUS PRN
Status: DISCONTINUED | OUTPATIENT
Start: 2020-04-23 | End: 2020-04-23 | Stop reason: SDUPTHER

## 2020-04-23 RX ORDER — MORPHINE SULFATE 2 MG/ML
1 INJECTION, SOLUTION INTRAMUSCULAR; INTRAVENOUS EVERY 5 MIN PRN
Status: DISCONTINUED | OUTPATIENT
Start: 2020-04-23 | End: 2020-04-23 | Stop reason: HOSPADM

## 2020-04-23 RX ORDER — 0.9 % SODIUM CHLORIDE 0.9 %
20 INTRAVENOUS SOLUTION INTRAVENOUS ONCE
Status: DISCONTINUED | OUTPATIENT
Start: 2020-04-23 | End: 2020-04-28

## 2020-04-23 RX ORDER — HYDROCODONE BITARTRATE AND ACETAMINOPHEN 5; 325 MG/1; MG/1
1 TABLET ORAL PRN
Status: DISCONTINUED | OUTPATIENT
Start: 2020-04-23 | End: 2020-04-23 | Stop reason: HOSPADM

## 2020-04-23 RX ORDER — ROCURONIUM BROMIDE 10 MG/ML
INJECTION, SOLUTION INTRAVENOUS PRN
Status: DISCONTINUED | OUTPATIENT
Start: 2020-04-23 | End: 2020-04-23 | Stop reason: SDUPTHER

## 2020-04-23 RX ORDER — HYDROCODONE BITARTRATE AND ACETAMINOPHEN 5; 325 MG/1; MG/1
2 TABLET ORAL PRN
Status: DISCONTINUED | OUTPATIENT
Start: 2020-04-23 | End: 2020-04-23 | Stop reason: HOSPADM

## 2020-04-23 RX ORDER — ONDANSETRON 2 MG/ML
INJECTION INTRAMUSCULAR; INTRAVENOUS PRN
Status: DISCONTINUED | OUTPATIENT
Start: 2020-04-23 | End: 2020-04-23 | Stop reason: SDUPTHER

## 2020-04-23 RX ORDER — MIDAZOLAM HYDROCHLORIDE 1 MG/ML
INJECTION INTRAMUSCULAR; INTRAVENOUS PRN
Status: DISCONTINUED | OUTPATIENT
Start: 2020-04-23 | End: 2020-04-23 | Stop reason: SDUPTHER

## 2020-04-23 RX ORDER — PROMETHAZINE HYDROCHLORIDE 25 MG/ML
6.25 INJECTION, SOLUTION INTRAMUSCULAR; INTRAVENOUS EVERY 10 MIN PRN
Status: DISCONTINUED | OUTPATIENT
Start: 2020-04-23 | End: 2020-04-23 | Stop reason: HOSPADM

## 2020-04-23 RX ORDER — SODIUM HYPOCHLORITE 5 MG/ML
SOLUTION TOPICAL ONCE
Status: DISCONTINUED | OUTPATIENT
Start: 2020-04-23 | End: 2020-04-23

## 2020-04-23 RX ORDER — MEPERIDINE HYDROCHLORIDE 25 MG/ML
12.5 INJECTION INTRAMUSCULAR; INTRAVENOUS; SUBCUTANEOUS EVERY 5 MIN PRN
Status: DISCONTINUED | OUTPATIENT
Start: 2020-04-23 | End: 2020-04-23 | Stop reason: HOSPADM

## 2020-04-23 RX ORDER — SUCCINYLCHOLINE/SOD CL,ISO/PF 200MG/10ML
SYRINGE (ML) INTRAVENOUS PRN
Status: DISCONTINUED | OUTPATIENT
Start: 2020-04-23 | End: 2020-04-23 | Stop reason: SDUPTHER

## 2020-04-23 RX ORDER — LIDOCAINE HYDROCHLORIDE 20 MG/ML
INJECTION, SOLUTION INTRAVENOUS PRN
Status: DISCONTINUED | OUTPATIENT
Start: 2020-04-23 | End: 2020-04-23 | Stop reason: SDUPTHER

## 2020-04-23 RX ORDER — GLYCOPYRROLATE 1 MG/5 ML
SYRINGE (ML) INTRAVENOUS PRN
Status: DISCONTINUED | OUTPATIENT
Start: 2020-04-23 | End: 2020-04-23 | Stop reason: SDUPTHER

## 2020-04-23 RX ORDER — SODIUM CHLORIDE 9 MG/ML
INJECTION, SOLUTION INTRAVENOUS CONTINUOUS PRN
Status: DISCONTINUED | OUTPATIENT
Start: 2020-04-23 | End: 2020-04-23 | Stop reason: SDUPTHER

## 2020-04-23 RX ORDER — FENTANYL CITRATE 50 UG/ML
INJECTION, SOLUTION INTRAMUSCULAR; INTRAVENOUS PRN
Status: DISCONTINUED | OUTPATIENT
Start: 2020-04-23 | End: 2020-04-23 | Stop reason: SDUPTHER

## 2020-04-23 RX ORDER — MORPHINE SULFATE 2 MG/ML
2 INJECTION, SOLUTION INTRAMUSCULAR; INTRAVENOUS EVERY 5 MIN PRN
Status: DISCONTINUED | OUTPATIENT
Start: 2020-04-23 | End: 2020-04-23 | Stop reason: HOSPADM

## 2020-04-23 RX ORDER — PHENYLEPHRINE HYDROCHLORIDE 10 MG/ML
INJECTION INTRAVENOUS PRN
Status: DISCONTINUED | OUTPATIENT
Start: 2020-04-23 | End: 2020-04-23 | Stop reason: SDUPTHER

## 2020-04-23 RX ORDER — DEXAMETHASONE SODIUM PHOSPHATE 10 MG/ML
INJECTION INTRAMUSCULAR; INTRAVENOUS PRN
Status: DISCONTINUED | OUTPATIENT
Start: 2020-04-23 | End: 2020-04-23 | Stop reason: SDUPTHER

## 2020-04-23 RX ORDER — NEOSTIGMINE METHYLSULFATE 1 MG/ML
INJECTION, SOLUTION INTRAVENOUS PRN
Status: DISCONTINUED | OUTPATIENT
Start: 2020-04-23 | End: 2020-04-23 | Stop reason: SDUPTHER

## 2020-04-23 RX ADMIN — HYDROMORPHONE HYDROCHLORIDE 0.5 MG: 1 INJECTION, SOLUTION INTRAMUSCULAR; INTRAVENOUS; SUBCUTANEOUS at 06:09

## 2020-04-23 RX ADMIN — ONDANSETRON HYDROCHLORIDE 4 MG: 2 INJECTION, SOLUTION INTRAMUSCULAR; INTRAVENOUS at 11:06

## 2020-04-23 RX ADMIN — PHENYLEPHRINE HYDROCHLORIDE 100 MCG: 10 INJECTION INTRAVENOUS at 10:49

## 2020-04-23 RX ADMIN — PHENYLEPHRINE HYDROCHLORIDE 30 MCG/MIN: 10 INJECTION INTRAVENOUS at 10:25

## 2020-04-23 RX ADMIN — MORPHINE SULFATE 2 MG: 2 INJECTION, SOLUTION INTRAMUSCULAR; INTRAVENOUS at 12:15

## 2020-04-23 RX ADMIN — OXYCODONE HYDROCHLORIDE 10 MG: 10 TABLET ORAL at 17:00

## 2020-04-23 RX ADMIN — MORPHINE SULFATE 2 MG: 2 INJECTION, SOLUTION INTRAMUSCULAR; INTRAVENOUS at 11:52

## 2020-04-23 RX ADMIN — OXYCODONE HYDROCHLORIDE 10 MG: 10 TABLET ORAL at 20:29

## 2020-04-23 RX ADMIN — PHENYLEPHRINE HYDROCHLORIDE 200 MCG: 10 INJECTION INTRAVENOUS at 10:23

## 2020-04-23 RX ADMIN — INSULIN LISPRO 1 UNITS: 100 INJECTION, SOLUTION INTRAVENOUS; SUBCUTANEOUS at 20:37

## 2020-04-23 RX ADMIN — PROPOFOL 30 MG: 10 INJECTION, EMULSION INTRAVENOUS at 10:29

## 2020-04-23 RX ADMIN — CEFEPIME 2 G: 2 INJECTION, POWDER, FOR SOLUTION INTRAVENOUS at 15:19

## 2020-04-23 RX ADMIN — PROPOFOL 20 MG: 10 INJECTION, EMULSION INTRAVENOUS at 11:11

## 2020-04-23 RX ADMIN — METOPROLOL TARTRATE 25 MG: 25 TABLET, FILM COATED ORAL at 20:29

## 2020-04-23 RX ADMIN — HYDROMORPHONE HYDROCHLORIDE 1 MG: 1 INJECTION, SOLUTION INTRAMUSCULAR; INTRAVENOUS; SUBCUTANEOUS at 21:32

## 2020-04-23 RX ADMIN — MORPHINE SULFATE 2 MG: 2 INJECTION, SOLUTION INTRAMUSCULAR; INTRAVENOUS at 12:32

## 2020-04-23 RX ADMIN — Medication 2 MG: at 11:11

## 2020-04-23 RX ADMIN — MORPHINE SULFATE 2 MG: 2 INJECTION, SOLUTION INTRAMUSCULAR; INTRAVENOUS at 12:22

## 2020-04-23 RX ADMIN — ONDANSETRON HYDROCHLORIDE 4 MG: 2 SOLUTION INTRAMUSCULAR; INTRAVENOUS at 21:39

## 2020-04-23 RX ADMIN — FERROUS SULFATE TAB 325 MG (65 MG ELEMENTAL FE) 325 MG: 325 (65 FE) TAB at 17:00

## 2020-04-23 RX ADMIN — INSULIN LISPRO 2 UNITS: 100 INJECTION, SOLUTION INTRAVENOUS; SUBCUTANEOUS at 17:00

## 2020-04-23 RX ADMIN — HYDROMORPHONE HYDROCHLORIDE 0.5 MG: 1 INJECTION, SOLUTION INTRAMUSCULAR; INTRAVENOUS; SUBCUTANEOUS at 18:13

## 2020-04-23 RX ADMIN — PHENYLEPHRINE HYDROCHLORIDE 100 MCG: 10 INJECTION INTRAVENOUS at 10:13

## 2020-04-23 RX ADMIN — LUBIPROSTONE 24 MCG: 24 CAPSULE, GELATIN COATED ORAL at 17:00

## 2020-04-23 RX ADMIN — CILOSTAZOL 100 MG: 100 TABLET ORAL at 20:29

## 2020-04-23 RX ADMIN — SODIUM CHLORIDE: 9 INJECTION, SOLUTION INTRAVENOUS at 09:57

## 2020-04-23 RX ADMIN — HYDROMORPHONE HYDROCHLORIDE 0.5 MG: 1 INJECTION, SOLUTION INTRAMUSCULAR; INTRAVENOUS; SUBCUTANEOUS at 15:10

## 2020-04-23 RX ADMIN — PHENYLEPHRINE HYDROCHLORIDE 200 MCG: 10 INJECTION INTRAVENOUS at 10:18

## 2020-04-23 RX ADMIN — PREGABALIN 200 MG: 100 CAPSULE ORAL at 20:29

## 2020-04-23 RX ADMIN — ROCURONIUM BROMIDE 10 MG: 10 INJECTION, SOLUTION INTRAVENOUS at 10:06

## 2020-04-23 RX ADMIN — PHENYLEPHRINE HYDROCHLORIDE 200 MCG: 10 INJECTION INTRAVENOUS at 10:31

## 2020-04-23 RX ADMIN — LIDOCAINE HYDROCHLORIDE 100 MG: 20 INJECTION, SOLUTION INTRAVENOUS at 10:06

## 2020-04-23 RX ADMIN — ROCURONIUM BROMIDE 20 MG: 10 INJECTION, SOLUTION INTRAVENOUS at 10:29

## 2020-04-23 RX ADMIN — FENTANYL CITRATE 25 MCG: 50 INJECTION, SOLUTION INTRAMUSCULAR; INTRAVENOUS at 11:21

## 2020-04-23 RX ADMIN — FENTANYL CITRATE 100 MCG: 50 INJECTION, SOLUTION INTRAMUSCULAR; INTRAVENOUS at 10:06

## 2020-04-23 RX ADMIN — Medication 0.4 MG: at 11:11

## 2020-04-23 RX ADMIN — PHENYLEPHRINE HYDROCHLORIDE 100 MCG: 10 INJECTION INTRAVENOUS at 10:15

## 2020-04-23 RX ADMIN — PHENYLEPHRINE HYDROCHLORIDE 200 MCG: 10 INJECTION INTRAVENOUS at 10:25

## 2020-04-23 RX ADMIN — PROPOFOL 150 MG: 10 INJECTION, EMULSION INTRAVENOUS at 10:06

## 2020-04-23 RX ADMIN — OXYCODONE HYDROCHLORIDE 10 MG: 10 TABLET ORAL at 04:07

## 2020-04-23 RX ADMIN — HYDROMORPHONE HYDROCHLORIDE 0.5 MG: 1 INJECTION, SOLUTION INTRAMUSCULAR; INTRAVENOUS; SUBCUTANEOUS at 13:20

## 2020-04-23 RX ADMIN — CLONIDINE HYDROCHLORIDE 0.1 MG: 0.1 TABLET ORAL at 20:29

## 2020-04-23 RX ADMIN — CEFEPIME 2 G: 2 INJECTION, POWDER, FOR SOLUTION INTRAVENOUS at 06:09

## 2020-04-23 RX ADMIN — PHENYLEPHRINE HYDROCHLORIDE 100 MCG: 10 INJECTION INTRAVENOUS at 10:58

## 2020-04-23 RX ADMIN — PHENYLEPHRINE HYDROCHLORIDE 200 MCG: 10 INJECTION INTRAVENOUS at 10:19

## 2020-04-23 RX ADMIN — Medication 140 MG: at 10:07

## 2020-04-23 RX ADMIN — DEXAMETHASONE SODIUM PHOSPHATE 10 MG: 10 INJECTION INTRAMUSCULAR; INTRAVENOUS at 10:21

## 2020-04-23 RX ADMIN — PHENYLEPHRINE HYDROCHLORIDE 200 MCG: 10 INJECTION INTRAVENOUS at 10:17

## 2020-04-23 RX ADMIN — MIDAZOLAM 2 MG: 1 INJECTION INTRAMUSCULAR; INTRAVENOUS at 09:57

## 2020-04-23 RX ADMIN — INSULIN GLARGINE 15 UNITS: 100 INJECTION, SOLUTION SUBCUTANEOUS at 20:37

## 2020-04-23 RX ADMIN — DOCUSATE SODIUM 100 MG: 100 CAPSULE, LIQUID FILLED ORAL at 20:30

## 2020-04-23 ASSESSMENT — PAIN DESCRIPTION - ORIENTATION
ORIENTATION: RIGHT;UPPER
ORIENTATION: RIGHT;UPPER
ORIENTATION: RIGHT
ORIENTATION: RIGHT;UPPER
ORIENTATION: RIGHT
ORIENTATION: RIGHT;UPPER
ORIENTATION: RIGHT
ORIENTATION: RIGHT;UPPER

## 2020-04-23 ASSESSMENT — PULMONARY FUNCTION TESTS
PIF_VALUE: 25
PIF_VALUE: 24
PIF_VALUE: 21
PIF_VALUE: 25
PIF_VALUE: 25
PIF_VALUE: 24
PIF_VALUE: 18
PIF_VALUE: 2
PIF_VALUE: 21
PIF_VALUE: 21
PIF_VALUE: 24
PIF_VALUE: 0
PIF_VALUE: 1
PIF_VALUE: 23
PIF_VALUE: 22
PIF_VALUE: 23
PIF_VALUE: 2
PIF_VALUE: 25
PIF_VALUE: 2
PIF_VALUE: 23
PIF_VALUE: 2
PIF_VALUE: 1
PIF_VALUE: 21
PIF_VALUE: 22
PIF_VALUE: 3
PIF_VALUE: 21
PIF_VALUE: 1
PIF_VALUE: 22
PIF_VALUE: 24
PIF_VALUE: 25
PIF_VALUE: 1
PIF_VALUE: 24
PIF_VALUE: 21
PIF_VALUE: 23
PIF_VALUE: 21
PIF_VALUE: 1
PIF_VALUE: 20
PIF_VALUE: 21
PIF_VALUE: 25
PIF_VALUE: 19
PIF_VALUE: 10
PIF_VALUE: 23
PIF_VALUE: 23
PIF_VALUE: 1
PIF_VALUE: 23
PIF_VALUE: 1
PIF_VALUE: 21
PIF_VALUE: 24
PIF_VALUE: 22
PIF_VALUE: 23
PIF_VALUE: 23
PIF_VALUE: 1
PIF_VALUE: 23
PIF_VALUE: 19
PIF_VALUE: 23
PIF_VALUE: 21
PIF_VALUE: 26
PIF_VALUE: 21
PIF_VALUE: 1
PIF_VALUE: 23
PIF_VALUE: 20
PIF_VALUE: 0
PIF_VALUE: 23
PIF_VALUE: 25
PIF_VALUE: 19
PIF_VALUE: 23
PIF_VALUE: 23
PIF_VALUE: 1
PIF_VALUE: 18
PIF_VALUE: 23
PIF_VALUE: 21
PIF_VALUE: 23
PIF_VALUE: 0

## 2020-04-23 ASSESSMENT — PAIN DESCRIPTION - PROGRESSION
CLINICAL_PROGRESSION: RAPIDLY WORSENING
CLINICAL_PROGRESSION: RAPIDLY WORSENING
CLINICAL_PROGRESSION: NOT CHANGED
CLINICAL_PROGRESSION: RAPIDLY WORSENING
CLINICAL_PROGRESSION: NOT CHANGED
CLINICAL_PROGRESSION: GRADUALLY IMPROVING

## 2020-04-23 ASSESSMENT — PAIN DESCRIPTION - FREQUENCY
FREQUENCY: CONTINUOUS
FREQUENCY: INTERMITTENT
FREQUENCY: INTERMITTENT
FREQUENCY: CONTINUOUS
FREQUENCY: INTERMITTENT
FREQUENCY: CONTINUOUS

## 2020-04-23 ASSESSMENT — PAIN SCALES - GENERAL
PAINLEVEL_OUTOF10: 10
PAINLEVEL_OUTOF10: 10
PAINLEVEL_OUTOF10: 8
PAINLEVEL_OUTOF10: 0
PAINLEVEL_OUTOF10: 3
PAINLEVEL_OUTOF10: 8
PAINLEVEL_OUTOF10: 6
PAINLEVEL_OUTOF10: 0
PAINLEVEL_OUTOF10: 7
PAINLEVEL_OUTOF10: 10
PAINLEVEL_OUTOF10: 6
PAINLEVEL_OUTOF10: 10
PAINLEVEL_OUTOF10: 10
PAINLEVEL_OUTOF10: 7
PAINLEVEL_OUTOF10: 8
PAINLEVEL_OUTOF10: 5
PAINLEVEL_OUTOF10: 7
PAINLEVEL_OUTOF10: 10
PAINLEVEL_OUTOF10: 7
PAINLEVEL_OUTOF10: 0
PAINLEVEL_OUTOF10: 5
PAINLEVEL_OUTOF10: 0
PAINLEVEL_OUTOF10: 5
PAINLEVEL_OUTOF10: 10

## 2020-04-23 ASSESSMENT — PAIN DESCRIPTION - LOCATION
LOCATION: LEG
LOCATION: HIP;LEG
LOCATION: HIP;LEG
LOCATION: LEG

## 2020-04-23 ASSESSMENT — PAIN DESCRIPTION - PAIN TYPE
TYPE: SURGICAL PAIN

## 2020-04-23 ASSESSMENT — PAIN DESCRIPTION - DESCRIPTORS
DESCRIPTORS: ACHING
DESCRIPTORS: ACHING;DISCOMFORT
DESCRIPTORS: DISCOMFORT
DESCRIPTORS: ACHING;DISCOMFORT
DESCRIPTORS: SHOOTING;SHARP;SORE
DESCRIPTORS: ACHING;CONSTANT;SHOOTING
DESCRIPTORS: SHOOTING;SHARP;SORE
DESCRIPTORS: SHARP;SHOOTING;SORE
DESCRIPTORS: ACHING;CONSTANT;SHARP
DESCRIPTORS: ACHING;DISCOMFORT

## 2020-04-23 ASSESSMENT — PAIN DESCRIPTION - ONSET
ONSET: ON-GOING
ONSET: ON-GOING
ONSET: AWAKENED FROM SLEEP
ONSET: ON-GOING

## 2020-04-23 ASSESSMENT — LIFESTYLE VARIABLES: SMOKING_STATUS: 1

## 2020-04-23 ASSESSMENT — PAIN - FUNCTIONAL ASSESSMENT
PAIN_FUNCTIONAL_ASSESSMENT: PREVENTS OR INTERFERES SOME ACTIVE ACTIVITIES AND ADLS
PAIN_FUNCTIONAL_ASSESSMENT: PREVENTS OR INTERFERES WITH MANY ACTIVE NOT PASSIVE ACTIVITIES
PAIN_FUNCTIONAL_ASSESSMENT: PREVENTS OR INTERFERES SOME ACTIVE ACTIVITIES AND ADLS
PAIN_FUNCTIONAL_ASSESSMENT: PREVENTS OR INTERFERES WITH MANY ACTIVE NOT PASSIVE ACTIVITIES
PAIN_FUNCTIONAL_ASSESSMENT: PREVENTS OR INTERFERES SOME ACTIVE ACTIVITIES AND ADLS

## 2020-04-23 ASSESSMENT — ENCOUNTER SYMPTOMS: SHORTNESS OF BREATH: 0

## 2020-04-23 NOTE — PROGRESS NOTES
[address] [place] [address] [facility]  Progress Note      Chief Complaint   Patient presents with    Wound Infection     right above knee amputation, drainage x 1 week       SUBJECTIVE:  Patient seen and examined at bedside  Patient is awake and alert  He denies any fevers or chills  He denies any shortness of breath or chest pain  He reports right thigh/amputation pain  Patient is tolerating medications. No reported adverse drug reactions. Denies any nausea, vomiting or diarrhea  State he is hungry since he just came back from the OR    Right lower extremity chronic stump wound, groin wound infection sp 3/20 R iliofemoral embolectomy, deep femoral embolectomy, 4/17 right lower extremity extensive debridement down to the muscle of right upper thigh, 04/20 right thigh necrosis excisional debridement of muscle, subcutaneous tissue and skin,  04/21 excisional debridement of infected necrotic tissue, right groin and right thigh, including skin, subcutaneous tissue, fascia, and muscles.       Review of systems:  As stated above in the chief complaint, otherwise negative.     Medications:  Scheduled Meds:   sodium chloride  20 mL Intravenous Once    acetaminophen  650 mg Oral Q6H    sodium hypochlorite   Irrigation Daily    daptomycin (CUBICIN) IVPB  650 mg Intravenous Q24H    cefepime  2 g Intravenous Q8H    sodium chloride flush  10 mL Intravenous 2 times per day    sodium chloride  25 mL Intravenous Q12H    amLODIPine  5 mg Oral Daily    aspirin  81 mg Oral Daily    atorvastatin  10 mg Oral Daily    cilostazol  100 mg Oral BID    cloNIDine  0.1 mg Oral BID    docusate sodium  100 mg Oral BID    DULoxetine  120 mg Oral QAM    pantoprazole  40 mg Oral QAM AC    ferrous sulfate  325 mg Oral BID WC    insulin glargine  15 Units Subcutaneous BID    lidocaine  1 patch Transdermal Daily    lisinopril  10 mg Oral Daily    lubiprostone  24 mcg Oral BID WC    metoprolol  25 mg Oral BID    miconazole   Topical BID    pregabalin  200 mg Oral TID    senna  1 tablet Oral Daily    spironolactone  50 mg Oral QAM    tamsulosin  0.4 mg Oral Daily    enoxaparin  40 mg Subcutaneous Daily    insulin lispro  0-6 Units Subcutaneous TID WC    insulin lispro  0-3 Units Subcutaneous Nightly     Continuous Infusions:   sodium chloride Stopped (20 0214)    sodium chloride 75 mL/hr at 20 0405    dextrose       PRN Meds:HYDROmorphone, oxyCODONE **OR** oxyCODONE, polyethylene glycol, HYDROmorphone **OR** HYDROmorphone, labetalol, sodium chloride flush, tiZANidine, magnesium hydroxide, promethazine **OR** ondansetron, glucose, dextrose, glucagon (rDNA), dextrose    OBJECTIVE:  /67   Pulse 86   Temp 98.6 °F (37 °C) (Temporal)   Resp 18   Ht 6' 2\" (1.88 m)   Wt (!) 335 lb (152 kg)   SpO2 98%   BMI 43.01 kg/m²   Temp  Av.5 °F (35.8 °C)  Min: 95.9 °F (35.5 °C)  Max: 98.6 °F (37 °C)  Constitutional: The patient is awake, alert, and oriented. Skin: Warm and dry. No rashes were noted. Pre op pictures              Postop    HEENT: Round and reactive pupils. Moist mucous membranes. No ulcerations or thrush. Neck: Supple to movements. Chest: No use of accessory muscles to breathe. Symmetrical expansion. No wheezing, crackles or rhonchi. Cardiovascular: S1 and S2 are rhythmic and regular. No murmurs appreciated. Abdomen: Positive bowel sounds to auscultation. Benign to palpation. No masses felt. No hepatosplenomegaly. Genitourinary: Male  Extremities: No clubbing, no cyanosis, no edema.   AKA on the right with extensive exploration and debridement of the right thigh--dressed with Ace wrap  Lines: peripheral    Laboratory and Tests Review:  Lab Results   Component Value Date    WBC 11.9 (H) 2020    WBC 9.7 2020    WBC 12.2 (H) 2020    HGB 8.9 (L) 2020    HCT 27.7 (L) 2020    MCV 82.4 2020     2020     Lab Results   Component Value

## 2020-04-23 NOTE — PROGRESS NOTES
today 4/20 for further excisional debridements    or again 4/21   Broad-spectrum IV antibiotic therapy with  cefepime / dapto again   Surgical cultures from right stump with Pseudomonas and E. coli, currently on appropriate antibiotics  infectious disease evaluation-appreciated  Vascular surgery to continue to follow as patient known to him from previous recent interventions  Resume home medications with appropriate dose adjustments  AC /at bedtime blood sugar checks with insulin sliding scale-adequately controlled  Adjust BP meds for accelerated hypertension-much better controlled today  Adjust pain meds if needed-optimally controlled   bowel regimen   A.m. labs      DVT Prophylaxis   PT/OT  Discharge planning       All consultants notes reviewed    Topher Renteria MD  4:12 PM  4/23/2020

## 2020-04-23 NOTE — ANESTHESIA PRE PROCEDURE
(CATAPRES) 0.1 MG tablet TAKE 1 TABLET BY MOUTH 3 TIMES A DAY 2/4/20  Yes Juanito Kline, DO   LANTUS 100 UNIT/ML injection vial INJECT 15 UNITS INTO THE SKIN TWO TIMES A DAY 2/4/20  Yes Juanito Kline, DO   lubiprostone (AMITIZA) 24 MCG capsule Take 1 capsule by mouth 2 times daily (with meals) 1/20/20  Yes Juanito Kline, DO   metoprolol (LOPRESSOR) 100 MG tablet TAKE 1 TABLET BY MOUTH 2 TIMES A DAY 1/8/20  Yes Juanito Kline, DO   insulin lispro (HUMALOG) 100 UNIT/ML injection vial Inject 5 Units into the skin 3 times daily (with meals) 11/11/19  Yes Juanito Kline, DO   ferrous sulfate 325 (65 Fe) MG tablet Take 1 tablet by mouth 2 times daily (with meals) 11/14/18  Yes Joe Akers MD   aspirin 81 MG chewable tablet Take 1 tablet by mouth daily 8/8/18  Yes Devang Green MD   lisinopril (PRINIVIL;ZESTRIL) 10 MG tablet Take 1 tablet by mouth daily Hold if sbp<140 8/7/18  Yes Devang Green MD   cilostazol (PLETAL) 100 MG tablet Take 100 mg by mouth 2 times daily   Yes Historical Provider, MD   pregabalin (LYRICA) 100 MG capsule Take 100 mg by mouth 2 times daily.     Yes Historical Provider, MD   spironolactone (ALDACTONE) 50 MG tablet Take 50 mg by mouth every morning    Yes Historical Provider, MD   atorvastatin (LIPITOR) 10 MG tablet Take 10 mg by mouth daily    Yes Historical Provider, MD   esomeprazole (NEXIUM) 40 MG delayed release capsule Take 40 mg by mouth every morning (before breakfast)   Yes Historical Provider, MD   ONE TOUCH ULTRA TEST strip USE TO TEST BLOOD SUGARS DAILY AS NEEDED 3/9/20   Juanito Kline, DO   Handicap Placard MISC by Does not apply route Patient cannot walk 200 ft without stopping to rest.    Expiration 10/21/2024 10/21/19   Juanito Kline DO   LANCETS MICRO THIN 12P MISC 1 applicator by Does not apply route daily 6/14/19   Juanito Kline, DO   Insulin Syringe-Needle U-100 30G X 5/16\" 0.3 ML MISC 1 box by Does not apply route 5 times daily 5/30/19   Juanito Kline, DO knee amputation), right ([address] Z89.611    History of vascular surgery Z98.890    Occlusion of common femoral artery (HCC) 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 ([address] 5/21/2018    Critical lower limb ischemia 3/20/2020    Diabetes mellitus ([address]     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 2 diabetes mellitus, with fat layer exposed ([address] 5/22/2018    DVT, lower extremity (HCC)     right leg     Gas gangrene of thigh ([address] 3/20/2020    Hyperlipidemia     Hypertension     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[address]     PVD (peripheral vascular disease) ([address]        Past Surgical History:        Procedure Laterality Date    FEMORAL BYPASS      Right - [place], [facility] Right 3/20/2020    RIGHT LOWER EXTREMITY THROMBECTOMY, POSSIBLE ANGIOGRAM, POSSIBLE INTERVENTION, POSSIBLE BYPASS. performed by Delaney Lloyd MD at [address] [place] [address] Right 4/17/2020    RIGHT LEG DEBRIDEMENT INCISION AND DRAINAGE performed by Olivia Newell MD at [address] [place] [address] Right 4/20/2020    RIGHT THIGH WOUND DRESSING CHANGE; DEBRIDEMENT, removal of muscle performed by Olivia Newell MD at [address] [place] [address] Right 4/21/2020    RIGHT THIGH WOUND DRESSING CHANGE POSSIBLE  DEBRIDEMENT - NEEDS DRS.  CASH / JANETTE TO SEE performed by Kamlesh Doan MD at [place] [address] [place] AMPUTATE 04/22/2020    K 4.4 04/22/2020    K 5.3 04/17/2020     04/22/2020    CO2 26 04/22/2020    BUN 12 04/22/2020    CREATININE 1.3 04/22/2020    GFRAA >60 04/22/2020    LABGLOM >60 04/22/2020    GLUCOSE 137 04/22/2020    PROT 6.4 04/22/2020    CALCIUM 8.7 04/22/2020    BILITOT 0.3 04/22/2020    ALKPHOS 99 04/22/2020    AST 19 04/22/2020    ALT 32 04/22/2020       POC Tests: No results for input(s): POCGLU, POCNA, POCK, POCCL, POCBUN, POCHEMO, POCHCT in the last 72 hours. Coags:   Lab Results   Component Value Date    PROTIME 18.4 04/16/2020    INR 1.6 04/16/2020    APTT 31.6 04/16/2020       HCG (If Applicable): No results found for: PREGTESTUR, PREGSERUM, HCG, HCGQUANT     ABGs:   Lab Results   Component Value Date    MWM3GKU 22.8 03/20/2020        Type & Screen (If Applicable):  No results found for: LABABO, LABRH     EKG 4/16/2020  Ventricular Rate 79  BPM Final 04/16/2020  2:21 PM [facility]   Atrial Rate 79  BPM Final 04/16/2020  2:21 PM [facility]   P-R Interval 244  ms Final 04/16/2020  2:21 PM [facility]   QRS Duration 98  ms Final 04/16/2020  2:21 PM [facility]   Q-T Interval 426  ms Final 04/16/2020  2:21 PM [facility]   QTc Calculation (Bazett) 488  ms Final 04/16/2020  2:21 PM [facility]   P Tyler 21  degrees Final 04/16/2020  2:21 PM [facility]   R Tyler 14  degrees Final 04/16/2020  2:21 PM [facility]   T Tyler 46  degrees Final 04/16/2020  2:21 PM [facility]   Testing Performed By     Lab - [address] Name Director Address Valid Date Range   360-[facility] MUSE Unknown Unknown 04/18/16 0721-Present   Narrative & Impression     Sinus rhythm with 1st degree AV block with premature supraventricular complexes  Nonspecific T wave abnormality  Abnormal ECG  Confirmed by Hien [id number]) on 4/17/2020 3:56:14 PM       ECHO 3/34/2020  Type of Study      JUSTO procedure:Transesophageal Echo (JUSTO), Doppler Echocardiography, Color   Flow Velocity Mapping.      Procedure Date  Date: 03/24/2020 Start: 09:02 scintigraphic findings consistent   with ongoing osteomyelitis here under appropriate clinical   circumstances. Clinical and possible updated radiographic correlation   recommended. 2. Abnormal medial left forefoot multiphase scintigraphic findings,   suggestive of possible ongoing osteomyelitis here also, under   appropriate clinical circumstances. Clinical and possible radiographic   correlation recommended. 3. Additional multifocal bilateral lower extremity scintigraphic   findings more likely related to degenerative type changes as noted.         Lower extremity ultrasound 7/31/2018  CONCLUSION:       1. Superficial thrombophlebitis involving the left greater saphenous   vein. 2. No evidence of deep venous thrombosis involving the right lower   extremity.           Anesthesia Evaluation  Patient summary reviewed and Nursing notes reviewed no history of anesthetic complications:   Airway: Mallampati: IV     Neck ROM: full  Mouth opening: > = 3 FB Dental:          Pulmonary: breath sounds clear to auscultation  (+) pneumonia (Hx Legionnaire's disease ):  sleep apnea: on CPAP,  decreased breath sounds,  current smoker ( )    (-) COPD, asthma and shortness of breath                          ROS comment: Hx rhinoplasty   Cardiovascular:  Exercise tolerance: poor (<4 METS),   (+) hypertension:, hyperlipidemia    (-) pacemaker, past MI, dysrhythmias and  angina    ECG reviewed  Rhythm: regular  Rate: normal  Echocardiogram reviewed                  Neuro/Psych:      (-) seizures, TIA and CVA           GI/Hepatic/Renal:   (+) renal disease (Hx CKD): CRI,      (-) GERD and liver disease       Endo/Other:    (+) DiabetesType II DM, poorly controlled, using insulin, blood dyscrasia (H/H = 8.8/27. 6): anticoagulation therapy and anemia:., electrolyte abnormalities, .                   ROS comment: Hx diabetic ulcer right midfoot   Hx OM Abdominal:   (+) obese,         Vascular:   + PVD, aortic or cerebral (Hx PVD), DVT (>5 years ago per pt), .        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 Anesthesia Plan      general     ASA 4     (  )  Induction: intravenous. Anesthetic plan and risks discussed with patient. Use of blood products discussed with patient whom consented to blood products. Plan discussed with attending. Kendal John  4/23/2020  9:46 AM     Pt seen, examined, chart reviewed, plan discussed.   Bushra Mcdonald  4/23/2020  9:52 AM

## 2020-04-23 NOTE — PLAN OF CARE
Problem: Increased nutrient needs (NI-5.1)  Goal: Food and/or Nutrient Delivery  Description: Individualized approach for food/nutrient provision.   Outcome: Ongoing

## 2020-04-23 NOTE — ANESTHESIA POSTPROCEDURE EVALUATION
Department of Anesthesiology  Postprocedure Note    Patient: Jocy Barnes  MRN: [id number]  YOB: 1955  Date of evaluation: 4/23/2020  Time:  12:31 PM     Procedure Summary     Date:  04/23/20 Room / Location:  [facility] OR 04 / [facility]    Anesthesia Start:  9815 Anesthesia Stop:      Procedure:  RIGHT THIGH WOUND DRESSING CHANGE POSSIBLE  DEBRIDEMENT (Right ) Diagnosis:  (RIGHT THIGH NECROSIS)    Surgeon:  Maria Guadalupe Slade MD Responsible Provider:  Martha Fraga DO    Anesthesia Type:  general ASA Status:  4          Anesthesia Type: general    Blaine Phase I: Blaine Score: 10    Blaine Phase II:      Last vitals: Reviewed and per EMR flowsheets.        Anesthesia Post Evaluation    Patient location during evaluation: PACU  Patient participation: complete - patient participated  Level of consciousness: awake  Pain score: 3  Airway patency: patent  Nausea & Vomiting: no nausea and no vomiting  Complications: no  Cardiovascular status: blood pressure returned to baseline  Respiratory status: acceptable  Hydration status: euvolemic

## 2020-04-24 ENCOUNTER — TELEPHONE (OUTPATIENT)
Dept: VASCULAR SURGERY | Age: 65
End: 2020-04-24

## 2020-04-24 LAB
ALBUMIN SERPL-MCNC: 2.2 G/DL (ref 3.5–5.2)
ALP BLD-CCNC: 86 U/L (ref 40–129)
ALT SERPL-CCNC: 26 U/L (ref 0–40)
ANION GAP SERPL CALCULATED.3IONS-SCNC: 10 MMOL/L (ref 7–16)
AST SERPL-CCNC: 19 U/L (ref 0–39)
BASOPHILS ABSOLUTE: 0.03 E9/L (ref 0–0.2)
BASOPHILS RELATIVE PERCENT: 0.1 % (ref 0–2)
BILIRUB SERPL-MCNC: 0.3 MG/DL (ref 0–1.2)
BUN BLDV-MCNC: 19 MG/DL (ref 8–23)
CALCIUM SERPL-MCNC: 8.6 MG/DL (ref 8.6–10.2)
CHLORIDE BLD-SCNC: 101 MMOL/L (ref 98–107)
CO2: 24 MMOL/L (ref 22–29)
CREAT SERPL-MCNC: 1.6 MG/DL (ref 0.7–1.2)
EOSINOPHILS ABSOLUTE: 0.01 E9/L (ref 0.05–0.5)
EOSINOPHILS RELATIVE PERCENT: 0 % (ref 0–6)
GFR AFRICAN AMERICAN: 53
GFR NON-AFRICAN AMERICAN: 53 ML/MIN/1.73
GLUCOSE BLD-MCNC: 207 MG/DL (ref 74–99)
HCT VFR BLD CALC: 24.7 % (ref 37–54)
HEMOGLOBIN: 7.9 G/DL (ref 12.5–16.5)
IMMATURE GRANULOCYTES #: 0.25 E9/L
IMMATURE GRANULOCYTES %: 1.1 % (ref 0–5)
LYMPHOCYTES ABSOLUTE: 2.08 E9/L (ref 1.5–4)
LYMPHOCYTES RELATIVE PERCENT: 9.5 % (ref 20–42)
MCH RBC QN AUTO: 26.5 PG (ref 26–35)
MCHC RBC AUTO-ENTMCNC: 32 % (ref 32–34.5)
MCV RBC AUTO: 82.9 FL (ref 80–99.9)
METER GLUCOSE: 159 MG/DL (ref 74–99)
METER GLUCOSE: 184 MG/DL (ref 74–99)
METER GLUCOSE: 190 MG/DL (ref 74–99)
METER GLUCOSE: 202 MG/DL (ref 74–99)
MONOCYTES ABSOLUTE: 1.45 E9/L (ref 0.1–0.95)
MONOCYTES RELATIVE PERCENT: 6.6 % (ref 2–12)
NEUTROPHILS ABSOLUTE: 18 E9/L (ref 1.8–7.3)
NEUTROPHILS RELATIVE PERCENT: 82.7 % (ref 43–80)
PDW BLD-RTO: 14.5 FL (ref 11.5–15)
PLATELET # BLD: 297 E9/L (ref 130–450)
PMV BLD AUTO: 10.9 FL (ref 7–12)
POTASSIUM SERPL-SCNC: 4.7 MMOL/L (ref 3.5–5)
PROCALCITONIN: 0.26 NG/ML (ref 0–0.08)
RBC # BLD: 2.98 E12/L (ref 3.8–5.8)
SODIUM BLD-SCNC: 135 MMOL/L (ref 132–146)
TOTAL PROTEIN: 6.3 G/DL (ref 6.4–8.3)
WBC # BLD: 21.8 E9/L (ref 4.5–11.5)

## 2020-04-24 PROCEDURE — 94660 CPAP INITIATION&MGMT: CPT

## 2020-04-24 PROCEDURE — 6360000002 HC RX W HCPCS: Performed by: STUDENT IN AN ORGANIZED HEALTH CARE EDUCATION/TRAINING PROGRAM

## 2020-04-24 PROCEDURE — 6370000000 HC RX 637 (ALT 250 FOR IP): Performed by: STUDENT IN AN ORGANIZED HEALTH CARE EDUCATION/TRAINING PROGRAM

## 2020-04-24 PROCEDURE — 2700000000 HC OXYGEN THERAPY PER DAY

## 2020-04-24 PROCEDURE — 84145 PROCALCITONIN (PCT): CPT

## 2020-04-24 PROCEDURE — 2580000003 HC RX 258: Performed by: STUDENT IN AN ORGANIZED HEALTH CARE EDUCATION/TRAINING PROGRAM

## 2020-04-24 PROCEDURE — 82962 GLUCOSE BLOOD TEST: CPT

## 2020-04-24 PROCEDURE — 80053 COMPREHEN METABOLIC PANEL: CPT

## 2020-04-24 PROCEDURE — 85025 COMPLETE CBC W/AUTO DIFF WBC: CPT

## 2020-04-24 PROCEDURE — 1200000000 HC SEMI PRIVATE

## 2020-04-24 PROCEDURE — 2580000003 HC RX 258: Performed by: SURGERY

## 2020-04-24 PROCEDURE — 6370000000 HC RX 637 (ALT 250 FOR IP): Performed by: SURGERY

## 2020-04-24 PROCEDURE — 6370000000 HC RX 637 (ALT 250 FOR IP): Performed by: INTERNAL MEDICINE

## 2020-04-24 PROCEDURE — 36415 COLL VENOUS BLD VENIPUNCTURE: CPT

## 2020-04-24 RX ORDER — MORPHINE SULFATE 15 MG/1
15 TABLET, FILM COATED, EXTENDED RELEASE ORAL EVERY 12 HOURS SCHEDULED
Status: DISCONTINUED | OUTPATIENT
Start: 2020-04-24 | End: 2020-04-25

## 2020-04-24 RX ORDER — POLYETHYLENE GLYCOL 3350 17 G/17G
17 POWDER, FOR SOLUTION ORAL DAILY
Status: DISCONTINUED | OUTPATIENT
Start: 2020-04-24 | End: 2020-05-04 | Stop reason: HOSPADM

## 2020-04-24 RX ORDER — CALCIUM CARBONATE 200(500)MG
500 TABLET,CHEWABLE ORAL 3 TIMES DAILY PRN
Status: DISCONTINUED | OUTPATIENT
Start: 2020-04-24 | End: 2020-05-04 | Stop reason: HOSPADM

## 2020-04-24 RX ORDER — 0.9 % SODIUM CHLORIDE 0.9 %
500 INTRAVENOUS SOLUTION INTRAVENOUS ONCE
Status: COMPLETED | OUTPATIENT
Start: 2020-04-24 | End: 2020-04-24

## 2020-04-24 RX ADMIN — CLONIDINE HYDROCHLORIDE 0.1 MG: 0.1 TABLET ORAL at 10:28

## 2020-04-24 RX ADMIN — MICONAZOLE NITRATE: 20.6 POWDER TOPICAL at 10:33

## 2020-04-24 RX ADMIN — LUBIPROSTONE 24 MCG: 24 CAPSULE, GELATIN COATED ORAL at 10:27

## 2020-04-24 RX ADMIN — ASPIRIN 81 MG 81 MG: 81 TABLET ORAL at 10:31

## 2020-04-24 RX ADMIN — ATORVASTATIN CALCIUM 10 MG: 10 TABLET, FILM COATED ORAL at 10:28

## 2020-04-24 RX ADMIN — DULOXETINE HYDROCHLORIDE 120 MG: 60 CAPSULE, DELAYED RELEASE ORAL at 10:27

## 2020-04-24 RX ADMIN — SPIRONOLACTONE 50 MG: 25 TABLET ORAL at 10:27

## 2020-04-24 RX ADMIN — OXYCODONE HYDROCHLORIDE 10 MG: 10 TABLET ORAL at 04:19

## 2020-04-24 RX ADMIN — ONDANSETRON HYDROCHLORIDE 4 MG: 2 SOLUTION INTRAMUSCULAR; INTRAVENOUS at 18:24

## 2020-04-24 RX ADMIN — SODIUM CHLORIDE 500 ML: 9 INJECTION, SOLUTION INTRAVENOUS at 10:24

## 2020-04-24 RX ADMIN — OXYCODONE HYDROCHLORIDE 10 MG: 10 TABLET ORAL at 10:31

## 2020-04-24 RX ADMIN — SODIUM CHLORIDE: 9 INJECTION, SOLUTION INTRAVENOUS at 10:32

## 2020-04-24 RX ADMIN — INSULIN GLARGINE 15 UNITS: 100 INJECTION, SOLUTION SUBCUTANEOUS at 10:42

## 2020-04-24 RX ADMIN — CEFEPIME 2 G: 2 INJECTION, POWDER, FOR SOLUTION INTRAVENOUS at 00:05

## 2020-04-24 RX ADMIN — HYDROMORPHONE HYDROCHLORIDE 0.5 MG: 1 INJECTION, SOLUTION INTRAMUSCULAR; INTRAVENOUS; SUBCUTANEOUS at 22:13

## 2020-04-24 RX ADMIN — TIZANIDINE 4 MG: 4 TABLET ORAL at 22:19

## 2020-04-24 RX ADMIN — ATORVASTATIN CALCIUM 10 MG: 10 TABLET, FILM COATED ORAL at 22:56

## 2020-04-24 RX ADMIN — METOPROLOL TARTRATE 25 MG: 25 TABLET, FILM COATED ORAL at 23:03

## 2020-04-24 RX ADMIN — MICONAZOLE NITRATE: 20.6 POWDER TOPICAL at 23:24

## 2020-04-24 RX ADMIN — STANDARDIZED SENNA CONCENTRATE 8.6 MG: 8.6 TABLET ORAL at 23:03

## 2020-04-24 RX ADMIN — TAMSULOSIN HYDROCHLORIDE 0.4 MG: 0.4 CAPSULE ORAL at 10:33

## 2020-04-24 RX ADMIN — CEFEPIME 2 G: 2 INJECTION, POWDER, FOR SOLUTION INTRAVENOUS at 10:25

## 2020-04-24 RX ADMIN — AMLODIPINE BESYLATE 5 MG: 5 TABLET ORAL at 10:27

## 2020-04-24 RX ADMIN — CEFEPIME 2 G: 2 INJECTION, POWDER, FOR SOLUTION INTRAVENOUS at 18:07

## 2020-04-24 RX ADMIN — INSULIN LISPRO 1 UNITS: 100 INJECTION, SOLUTION INTRAVENOUS; SUBCUTANEOUS at 10:41

## 2020-04-24 RX ADMIN — SODIUM CHLORIDE 25 ML: 9 INJECTION, SOLUTION INTRAVENOUS at 22:11

## 2020-04-24 RX ADMIN — ACETAMINOPHEN 650 MG: 325 TABLET, FILM COATED ORAL at 10:29

## 2020-04-24 RX ADMIN — PREGABALIN 200 MG: 100 CAPSULE ORAL at 23:03

## 2020-04-24 RX ADMIN — CILOSTAZOL 100 MG: 100 TABLET ORAL at 22:17

## 2020-04-24 RX ADMIN — ENOXAPARIN SODIUM 40 MG: 100 INJECTION SUBCUTANEOUS at 10:26

## 2020-04-24 RX ADMIN — CALCIUM CARBONATE (ANTACID) CHEW TAB 500 MG 500 MG: 500 CHEW TAB at 23:04

## 2020-04-24 RX ADMIN — LISINOPRIL 10 MG: 20 TABLET ORAL at 10:29

## 2020-04-24 RX ADMIN — SODIUM CHLORIDE 25 ML: 9 INJECTION, SOLUTION INTRAVENOUS at 10:32

## 2020-04-24 RX ADMIN — POLYETHYLENE GLYCOL 3350 17 G: 17 POWDER, FOR SOLUTION ORAL at 23:06

## 2020-04-24 RX ADMIN — Medication 10 ML: at 10:26

## 2020-04-24 RX ADMIN — SODIUM CHLORIDE: 9 INJECTION, SOLUTION INTRAVENOUS at 20:37

## 2020-04-24 RX ADMIN — HYOSCYAMINE SULFATE: 16 SOLUTION at 08:35

## 2020-04-24 RX ADMIN — DOCUSATE SODIUM 100 MG: 100 CAPSULE, LIQUID FILLED ORAL at 22:16

## 2020-04-24 RX ADMIN — LUBIPROSTONE 24 MCG: 24 CAPSULE, GELATIN COATED ORAL at 22:15

## 2020-04-24 RX ADMIN — ATORVASTATIN CALCIUM 10 MG: 10 TABLET, FILM COATED ORAL at 23:04

## 2020-04-24 RX ADMIN — INSULIN GLARGINE 15 UNITS: 100 INJECTION, SOLUTION SUBCUTANEOUS at 23:23

## 2020-04-24 RX ADMIN — DOCUSATE SODIUM 100 MG: 100 CAPSULE, LIQUID FILLED ORAL at 10:30

## 2020-04-24 RX ADMIN — CILOSTAZOL 100 MG: 100 TABLET ORAL at 10:28

## 2020-04-24 RX ADMIN — TIZANIDINE 4 MG: 4 TABLET ORAL at 10:27

## 2020-04-24 RX ADMIN — Medication 10 ML: at 22:58

## 2020-04-24 RX ADMIN — MORPHINE SULFATE 15 MG: 15 TABLET, FILM COATED, EXTENDED RELEASE ORAL at 13:51

## 2020-04-24 RX ADMIN — SODIUM CHLORIDE, PRESERVATIVE FREE 10 ML: 5 INJECTION INTRAVENOUS at 18:25

## 2020-04-24 RX ADMIN — INSULIN LISPRO 1 UNITS: 100 INJECTION, SOLUTION INTRAVENOUS; SUBCUTANEOUS at 23:11

## 2020-04-24 RX ADMIN — HYDROMORPHONE HYDROCHLORIDE 1 MG: 1 INJECTION, SOLUTION INTRAMUSCULAR; INTRAVENOUS; SUBCUTANEOUS at 05:50

## 2020-04-24 RX ADMIN — HYDROMORPHONE HYDROCHLORIDE 0.5 MG: 1 INJECTION, SOLUTION INTRAMUSCULAR; INTRAVENOUS; SUBCUTANEOUS at 13:50

## 2020-04-24 ASSESSMENT — PAIN DESCRIPTION - ORIENTATION
ORIENTATION: RIGHT;UPPER

## 2020-04-24 ASSESSMENT — PAIN SCALES - GENERAL
PAINLEVEL_OUTOF10: 7
PAINLEVEL_OUTOF10: 5
PAINLEVEL_OUTOF10: 10
PAINLEVEL_OUTOF10: 0
PAINLEVEL_OUTOF10: 7
PAINLEVEL_OUTOF10: 8
PAINLEVEL_OUTOF10: 5
PAINLEVEL_OUTOF10: 9
PAINLEVEL_OUTOF10: 7
PAINLEVEL_OUTOF10: 4
PAINLEVEL_OUTOF10: 6
PAINLEVEL_OUTOF10: 9
PAINLEVEL_OUTOF10: 10
PAINLEVEL_OUTOF10: 0

## 2020-04-24 ASSESSMENT — PAIN DESCRIPTION - PAIN TYPE
TYPE: ACUTE PAIN;SURGICAL PAIN
TYPE: SURGICAL PAIN
TYPE: ACUTE PAIN;SURGICAL PAIN
TYPE: ACUTE PAIN;SURGICAL PAIN
TYPE: SURGICAL PAIN

## 2020-04-24 ASSESSMENT — PAIN - FUNCTIONAL ASSESSMENT

## 2020-04-24 ASSESSMENT — PAIN DESCRIPTION - ONSET
ONSET: ON-GOING

## 2020-04-24 ASSESSMENT — PAIN DESCRIPTION - PROGRESSION
CLINICAL_PROGRESSION: NOT CHANGED
CLINICAL_PROGRESSION: RAPIDLY WORSENING
CLINICAL_PROGRESSION: NOT CHANGED
CLINICAL_PROGRESSION: GRADUALLY IMPROVING
CLINICAL_PROGRESSION: GRADUALLY IMPROVING

## 2020-04-24 ASSESSMENT — PAIN DESCRIPTION - FREQUENCY
FREQUENCY: CONTINUOUS

## 2020-04-24 ASSESSMENT — PAIN DESCRIPTION - LOCATION
LOCATION: LEG

## 2020-04-24 ASSESSMENT — PAIN DESCRIPTION - DESCRIPTORS
DESCRIPTORS: ACHING;CONSTANT;DISCOMFORT
DESCRIPTORS: SHARP;SHOOTING;SORE
DESCRIPTORS: ACHING;CONSTANT;DISCOMFORT
DESCRIPTORS: ACHING;BURNING;CONSTANT;DISCOMFORT
DESCRIPTORS: SHARP;SHOOTING;SORE
DESCRIPTORS: ACHING;CONSTANT
DESCRIPTORS: ACHING;DISCOMFORT

## 2020-04-24 NOTE — PROGRESS NOTES
Subjective: The patient is awake and alert. No acute events overnight. Denies chest pain, angina, SOB   Pain not controlled today   Passing gas but no bm   tolerating po intake       Objective:    /65   Pulse 86   Temp 97.6 °F (36.4 °C) (Temporal)   Resp 17   Ht 6' 2\" (1.88 m)   Wt (!) 335 lb (152 kg)   SpO2 95%   BMI 43.01 kg/m²     In: 775 [I.V.:675]  Out: 600     HEENT: NCAT,  PERRLA, No JVD  Heart:  RRR, no murmurs, gallops, or rubs.   Lungs:  CTA bilaterally, no wheeze, rales or rhonchi  Abd: bowel sounds present, nontender, nondistended, no masses  Extrem: Right thigh stump in dressing      Recent Labs     04/22/20  0620 04/23/20  1528 04/24/20  0425   WBC 11.9*  --  21.8*   HGB 8.8* 8.9* 7.9*   HCT 27.6* 27.7* 24.7*     --  297       Recent Labs     04/22/20  0620 04/24/20  0425    135   K 4.4 4.7    101   CO2 26 24   BUN 12 19   CREATININE 1.3* 1.6*   CALCIUM 8.7 8.6       Assessment:    Patient Active Problem List   Diagnosis    DM II (diabetes mellitus, type II), controlled ([address]    HTN (hypertension)    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)    Coagulopathy (HCC)    Moderate protein-calorie malnutrition (HCC)    PVD (peripheral vascular disease) (HCC)    Leukocytosis    Stage 3 chronic kidney disease (HCC)    Tobacco dependence    HLD (hyperlipidemia)    History of DVT (deep vein thrombosis)    Osteomyelitis (HCC)    S/P AKA [facility]above knee amputation), right [facility])    History of vascular surgery    Occlusion of common femoral artery (HCC)    Cellulitis, scrotum    Hyperglycemia due to type 2 diabetes mellitus ([address]    Critical lower limb ischemia    Gas gangrene of thigh ([address]    Vascular occlusion    Wound infection    Postoperative wound infection    Ischemic ulcer of right thigh with fat layer exposed ([address]    Ischemic ulcer of right thigh with necrosis of muscle (HCC)   Sepsis, present on admission Plan:    Admit to telemetry for evaluation of right thigh stump abscess-unhealed  S/p or again today 4/20 for further excisional debridements    or  4/21   Broad-spectrum IV antibiotic therapy with  cefepime / dapto again   Surgical cultures from right stump with Pseudomonas and E. coli, currently on appropriate antibiotics  infectious disease evaluation-appreciated  Vascular surgeryand plastics following   Resume home medications with appropriate dose adjustments  AC /at bedtime blood sugar checks with insulin sliding scale-adequately controlled  Adjust BP meds for accelerated hypertension-much better controlled  Adjust pain meds if needed-optimally controlled   bowel regimen with zee lax, enema suppository prn - constipated dt immobility and narcotics   Plan pain regimen with ms contin and ir if no plans for pain pump - will discuss w dr. Russ Coulter   A.m. labs      DVT Prophylaxis   PT/OT  Discharge planning       All consultants notes reviewed    Rigoberto Stoddard MD  10:23 AM  4/24/2020

## 2020-04-24 NOTE — PROGRESS NOTES
Date: 4/24/2020    Time: 0815   Patient Placed On BIPAP/CPAP/ Non-Invasive Ventilation?   No     Comments:    Patient is not wearing bipap at this time     Farnsworth Pin

## 2020-04-24 NOTE — PROGRESS NOTES
04/24/20 0500   NIV Type   $NIV $Daily Charge   Skin Assessment Clean, dry, & intact   Equipment Type FOCUS   Mode CPAP   Mask Type Full face mask   Mask Size Medium   Bonnet size Medium   Settings/Measurements   CPAP/EPAP 6 cmH2O   Resp 22   O2 Flow Rate (L/min) 2 L/min   Vt Exhaled 600 mL   Minute Volume 13 Liters   Comfort Level Good   SpO2 97   Date: 4/24/2020    Time: 6:00 AM    CPAP RECEHCK  Facial area red/color change? No           If YES are Blister/Lesion present? No   If yes must notify nursing staff  BIPAP/CPAP skin barrier?   Yes    Skin barrier type:duoderm       Comments:        Vianey Carbajal

## 2020-04-24 NOTE — PROGRESS NOTES
[address] [place] [address] [facility]  Progress Note      Chief Complaint   Patient presents with    Wound Infection     right above knee amputation, drainage x 1 week       SUBJECTIVE:  Patient seen and examined at bedside  Patient is awake and alert  He denies any fevers or chills  He denies any shortness of breath or chest pain  He reports right thigh/amputation pain  Patient is tolerating medications. No reported adverse drug reactions. Denies any nausea, vomiting or diarrhea  State he is hungry since he just came back from the OR    Right lower extremity chronic stump wound, groin wound infection sp 3/20 R iliofemoral embolectomy, deep femoral embolectomy, 4/17 right lower extremity extensive debridement down to the muscle of right upper thigh, 04/20 right thigh necrosis excisional debridement of muscle, subcutaneous tissue and skin,  04/21 excisional debridement of infected necrotic tissue, right groin and right thigh, including skin, subcutaneous tissue, fascia, and muscles.       Review of systems:  As stated above in the chief complaint, otherwise negative.     Medications:  Scheduled Meds:   polyethylene glycol  17 g Oral Daily    morphine  15 mg Oral 2 times per day    sodium chloride  20 mL Intravenous Once    acetaminophen  650 mg Oral Q6H    sodium hypochlorite   Irrigation Daily    cefepime  2 g Intravenous Q8H    sodium chloride flush  10 mL Intravenous 2 times per day    sodium chloride  25 mL Intravenous Q12H    amLODIPine  5 mg Oral Daily    aspirin  81 mg Oral Daily    atorvastatin  10 mg Oral Daily    cilostazol  100 mg Oral BID    cloNIDine  0.1 mg Oral BID    docusate sodium  100 mg Oral BID    DULoxetine  120 mg Oral QAM    pantoprazole  40 mg Oral QAM AC    ferrous sulfate  325 mg Oral BID     insulin glargine  15 Units Subcutaneous BID    lidocaine  1 patch Transdermal Daily    lisinopril  10 mg Oral Daily    lubiprostone  24 mcg Oral BID WC    metoprolol  25 mg Oral BID    miconazole   Topical BID    pregabalin  200 mg Oral TID    senna  1 tablet Oral Daily    spironolactone  50 mg Oral QAM    tamsulosin  0.4 mg Oral Daily    enoxaparin  40 mg Subcutaneous Daily    insulin lispro  0-6 Units Subcutaneous TID     insulin lispro  0-3 Units Subcutaneous Nightly     Continuous Infusions:   sodium chloride 12.5 mL/hr at 20 1032    sodium chloride 75 mL/hr at 20 0405    dextrose       PRN Meds:HYDROmorphone, oxyCODONE **OR** oxyCODONE, [DISCONTINUED] HYDROmorphone **OR** HYDROmorphone, labetalol, sodium chloride flush, tiZANidine, magnesium hydroxide, promethazine **OR** ondansetron, glucose, dextrose, glucagon (rDNA), dextrose    OBJECTIVE:  /65   Pulse 86   Temp 97.6 °F (36.4 °C) (Temporal)   Resp 17   Ht 6' 2\" (1.88 m)   Wt (!) 335 lb (152 kg)   SpO2 95%   BMI 43.01 kg/m²   Temp  Av.9 °F (36.6 °C)  Min: 97.3 °F (36.3 °C)  Max: 98.6 °F (37 °C)  Constitutional: The patient is awake, alert, and oriented. Skin: Warm and dry. No rashes were noted. Pre op pictures              Postop    HEENT: Round and reactive pupils. Moist mucous membranes. No ulcerations or thrush. Neck: Supple to movements. Chest: No use of accessory muscles to breathe. Symmetrical expansion. No wheezing, crackles or rhonchi. Cardiovascular: S1 and S2 are rhythmic and regular. No murmurs appreciated. Abdomen: Positive bowel sounds to auscultation. Benign to palpation. No masses felt. No hepatosplenomegaly. Genitourinary: Male  Extremities: No clubbing, no cyanosis, no edema.   AKA on the right with extensive exploration and debridement of the right thigh--dressed with Ace wrap  Lines: peripheral    Laboratory and Tests Review:  Lab Results   Component Value Date    WBC 21.8 (H) 2020    WBC 11.9 (H) 2020    WBC 9.7 2020    HGB 7.9 (L) 2020    HCT 24.7 (L) 2020    MCV 82.9 2020     2020

## 2020-04-24 NOTE — PROGRESS NOTES
Vascular Surgery Progress Note    Pt is being seen in f/u today regarding right lower extremity chronic stump wound, groin wound infection sp 3/20 R iliofemoral embolectomy, deep femoral embolectomy, 4/17, 4/20, 4/21, 4/23 right lower extremity wound debridement     Subjective  Pt s/e. Overnight he was having pain control issues. The percocet was making him nauseated.      Current Medications:    sodium chloride Stopped (04/21/20 0214)    sodium chloride 75 mL/hr at 04/20/20 0405    dextrose        HYDROmorphone, oxyCODONE **OR** oxyCODONE, polyethylene glycol, HYDROmorphone **OR** HYDROmorphone, labetalol, sodium chloride flush, tiZANidine, magnesium hydroxide, promethazine **OR** ondansetron, glucose, dextrose, glucagon (rDNA), dextrose    sodium chloride  20 mL Intravenous Once    acetaminophen  650 mg Oral Q6H    sodium hypochlorite   Irrigation Daily    cefepime  2 g Intravenous Q8H    sodium chloride flush  10 mL Intravenous 2 times per day    sodium chloride  25 mL Intravenous Q12H    amLODIPine  5 mg Oral Daily    aspirin  81 mg Oral Daily    atorvastatin  10 mg Oral Daily    cilostazol  100 mg Oral BID    cloNIDine  0.1 mg Oral BID    docusate sodium  100 mg Oral BID    DULoxetine  120 mg Oral QAM    pantoprazole  40 mg Oral QAM AC    ferrous sulfate  325 mg Oral BID WC    insulin glargine  15 Units Subcutaneous BID    lidocaine  1 patch Transdermal Daily    lisinopril  10 mg Oral Daily    lubiprostone  24 mcg Oral BID WC    metoprolol  25 mg Oral BID    miconazole   Topical BID    pregabalin  200 mg Oral TID    senna  1 tablet Oral Daily    spironolactone  50 mg Oral QAM    tamsulosin  0.4 mg Oral Daily    enoxaparin  40 mg Subcutaneous Daily    insulin lispro  0-6 Units Subcutaneous TID WC    insulin lispro  0-3 Units Subcutaneous Nightly        PHYSICAL EXAM:    BP (!) 185/82   Pulse 88   Temp 98.5 °F (36.9 °C) (Temporal)   Resp 22   Ht 6' 2\" (1.88 m)   Wt (!) 335 lb

## 2020-04-25 LAB
CULTURE SURGICAL: ABNORMAL
METER GLUCOSE: 144 MG/DL (ref 74–99)
METER GLUCOSE: 151 MG/DL (ref 74–99)
METER GLUCOSE: 159 MG/DL (ref 74–99)
METER GLUCOSE: 170 MG/DL (ref 74–99)
ORGANISM: ABNORMAL

## 2020-04-25 PROCEDURE — 2580000003 HC RX 258: Performed by: STUDENT IN AN ORGANIZED HEALTH CARE EDUCATION/TRAINING PROGRAM

## 2020-04-25 PROCEDURE — 6370000000 HC RX 637 (ALT 250 FOR IP): Performed by: STUDENT IN AN ORGANIZED HEALTH CARE EDUCATION/TRAINING PROGRAM

## 2020-04-25 PROCEDURE — 6370000000 HC RX 637 (ALT 250 FOR IP): Performed by: INTERNAL MEDICINE

## 2020-04-25 PROCEDURE — 2700000000 HC OXYGEN THERAPY PER DAY

## 2020-04-25 PROCEDURE — 1200000000 HC SEMI PRIVATE

## 2020-04-25 PROCEDURE — 6360000002 HC RX W HCPCS: Performed by: STUDENT IN AN ORGANIZED HEALTH CARE EDUCATION/TRAINING PROGRAM

## 2020-04-25 PROCEDURE — 82962 GLUCOSE BLOOD TEST: CPT

## 2020-04-25 PROCEDURE — 94660 CPAP INITIATION&MGMT: CPT

## 2020-04-25 PROCEDURE — 6370000000 HC RX 637 (ALT 250 FOR IP): Performed by: SURGERY

## 2020-04-25 PROCEDURE — 94770 HC ETCO2 MONITOR DAILY: CPT

## 2020-04-25 RX ORDER — ACETAMINOPHEN 325 MG/1
650 TABLET ORAL EVERY 6 HOURS PRN
Status: DISCONTINUED | OUTPATIENT
Start: 2020-04-25 | End: 2020-05-02

## 2020-04-25 RX ORDER — NALOXONE HYDROCHLORIDE 0.4 MG/ML
0.4 INJECTION, SOLUTION INTRAMUSCULAR; INTRAVENOUS; SUBCUTANEOUS PRN
Status: DISCONTINUED | OUTPATIENT
Start: 2020-04-25 | End: 2020-05-04 | Stop reason: HOSPADM

## 2020-04-25 RX ORDER — MORPHINE SULFATE 30 MG/1
30 TABLET, FILM COATED, EXTENDED RELEASE ORAL EVERY 12 HOURS SCHEDULED
Status: DISCONTINUED | OUTPATIENT
Start: 2020-04-25 | End: 2020-04-26

## 2020-04-25 RX ADMIN — CILOSTAZOL 100 MG: 100 TABLET ORAL at 20:35

## 2020-04-25 RX ADMIN — DULOXETINE HYDROCHLORIDE 120 MG: 60 CAPSULE, DELAYED RELEASE ORAL at 09:08

## 2020-04-25 RX ADMIN — ATORVASTATIN CALCIUM 10 MG: 10 TABLET, FILM COATED ORAL at 20:35

## 2020-04-25 RX ADMIN — INSULIN LISPRO 1 UNITS: 100 INJECTION, SOLUTION INTRAVENOUS; SUBCUTANEOUS at 20:56

## 2020-04-25 RX ADMIN — OXYCODONE HYDROCHLORIDE 10 MG: 10 TABLET ORAL at 09:09

## 2020-04-25 RX ADMIN — PANTOPRAZOLE SODIUM 40 MG: 40 TABLET, DELAYED RELEASE ORAL at 07:11

## 2020-04-25 RX ADMIN — SODIUM CHLORIDE 25 ML: 9 INJECTION, SOLUTION INTRAVENOUS at 07:00

## 2020-04-25 RX ADMIN — CLONIDINE HYDROCHLORIDE 0.1 MG: 0.1 TABLET ORAL at 20:35

## 2020-04-25 RX ADMIN — METOPROLOL TARTRATE 25 MG: 25 TABLET, FILM COATED ORAL at 09:08

## 2020-04-25 RX ADMIN — INSULIN LISPRO 1 UNITS: 100 INJECTION, SOLUTION INTRAVENOUS; SUBCUTANEOUS at 17:31

## 2020-04-25 RX ADMIN — CLONIDINE HYDROCHLORIDE 0.1 MG: 0.1 TABLET ORAL at 09:09

## 2020-04-25 RX ADMIN — HYDROMORPHONE HYDROCHLORIDE 1 MG: 1 INJECTION, SOLUTION INTRAMUSCULAR; INTRAVENOUS; SUBCUTANEOUS at 06:39

## 2020-04-25 RX ADMIN — AMLODIPINE BESYLATE 5 MG: 5 TABLET ORAL at 09:08

## 2020-04-25 RX ADMIN — MORPHINE SULFATE 15 MG: 15 TABLET, FILM COATED, EXTENDED RELEASE ORAL at 07:11

## 2020-04-25 RX ADMIN — CEFEPIME 2 G: 2 INJECTION, POWDER, FOR SOLUTION INTRAVENOUS at 02:24

## 2020-04-25 RX ADMIN — CEFEPIME 2 G: 2 INJECTION, POWDER, FOR SOLUTION INTRAVENOUS at 20:35

## 2020-04-25 RX ADMIN — HYDROMORPHONE HYDROCHLORIDE 1 MG: 1 INJECTION, SOLUTION INTRAMUSCULAR; INTRAVENOUS; SUBCUTANEOUS at 05:58

## 2020-04-25 RX ADMIN — PREGABALIN 200 MG: 100 CAPSULE ORAL at 09:08

## 2020-04-25 RX ADMIN — SODIUM CHLORIDE: 9 INJECTION, SOLUTION INTRAVENOUS at 06:24

## 2020-04-25 RX ADMIN — SPIRONOLACTONE 50 MG: 25 TABLET ORAL at 09:08

## 2020-04-25 RX ADMIN — LUBIPROSTONE 24 MCG: 24 CAPSULE, GELATIN COATED ORAL at 09:07

## 2020-04-25 RX ADMIN — CILOSTAZOL 100 MG: 100 TABLET ORAL at 09:09

## 2020-04-25 RX ADMIN — MICONAZOLE NITRATE: 20.6 POWDER TOPICAL at 09:11

## 2020-04-25 RX ADMIN — PREGABALIN 200 MG: 100 CAPSULE ORAL at 20:35

## 2020-04-25 RX ADMIN — Medication 6 MG: at 12:34

## 2020-04-25 RX ADMIN — POLYETHYLENE GLYCOL 3350 17 G: 17 POWDER, FOR SOLUTION ORAL at 09:09

## 2020-04-25 RX ADMIN — TAMSULOSIN HYDROCHLORIDE 0.4 MG: 0.4 CAPSULE ORAL at 09:10

## 2020-04-25 RX ADMIN — LISINOPRIL 10 MG: 20 TABLET ORAL at 10:54

## 2020-04-25 RX ADMIN — HYDROMORPHONE HYDROCHLORIDE 0.5 MG: 1 INJECTION, SOLUTION INTRAMUSCULAR; INTRAVENOUS; SUBCUTANEOUS at 02:24

## 2020-04-25 RX ADMIN — INSULIN GLARGINE 15 UNITS: 100 INJECTION, SOLUTION SUBCUTANEOUS at 20:56

## 2020-04-25 RX ADMIN — ASPIRIN 81 MG 81 MG: 81 TABLET ORAL at 09:09

## 2020-04-25 RX ADMIN — ENOXAPARIN SODIUM 40 MG: 100 INJECTION SUBCUTANEOUS at 09:13

## 2020-04-25 RX ADMIN — SODIUM CHLORIDE, PRESERVATIVE FREE 10 ML: 5 INJECTION INTRAVENOUS at 05:59

## 2020-04-25 RX ADMIN — CALCIUM CARBONATE (ANTACID) CHEW TAB 500 MG 500 MG: 500 CHEW TAB at 09:27

## 2020-04-25 RX ADMIN — ONDANSETRON HYDROCHLORIDE 4 MG: 2 SOLUTION INTRAMUSCULAR; INTRAVENOUS at 09:40

## 2020-04-25 RX ADMIN — CEFEPIME 2 G: 2 INJECTION, POWDER, FOR SOLUTION INTRAVENOUS at 11:56

## 2020-04-25 RX ADMIN — HYOSCYAMINE SULFATE: 16 SOLUTION at 09:18

## 2020-04-25 RX ADMIN — DOCUSATE SODIUM 100 MG: 100 CAPSULE, LIQUID FILLED ORAL at 20:35

## 2020-04-25 RX ADMIN — FERROUS SULFATE TAB 325 MG (65 MG ELEMENTAL FE) 325 MG: 325 (65 FE) TAB at 09:09

## 2020-04-25 RX ADMIN — STANDARDIZED SENNA CONCENTRATE 8.6 MG: 8.6 TABLET ORAL at 20:35

## 2020-04-25 RX ADMIN — DOCUSATE SODIUM 100 MG: 100 CAPSULE, LIQUID FILLED ORAL at 09:09

## 2020-04-25 ASSESSMENT — PAIN DESCRIPTION - DESCRIPTORS: DESCRIPTORS: ACHING;THROBBING

## 2020-04-25 ASSESSMENT — PAIN SCALES - GENERAL
PAINLEVEL_OUTOF10: 10
PAINLEVEL_OUTOF10: 8
PAINLEVEL_OUTOF10: 5
PAINLEVEL_OUTOF10: 6
PAINLEVEL_OUTOF10: 8
PAINLEVEL_OUTOF10: 8
PAINLEVEL_OUTOF10: 10
PAINLEVEL_OUTOF10: 8

## 2020-04-25 ASSESSMENT — PAIN DESCRIPTION - LOCATION
LOCATION: OTHER (COMMENT)
LOCATION: LEG

## 2020-04-25 ASSESSMENT — PAIN DESCRIPTION - PROGRESSION: CLINICAL_PROGRESSION: NOT CHANGED

## 2020-04-25 ASSESSMENT — PAIN DESCRIPTION - FREQUENCY: FREQUENCY: CONTINUOUS

## 2020-04-25 ASSESSMENT — PAIN DESCRIPTION - PAIN TYPE
TYPE: SURGICAL PAIN
TYPE: CHRONIC PAIN;SURGICAL PAIN

## 2020-04-25 ASSESSMENT — PAIN DESCRIPTION - ORIENTATION: ORIENTATION: RIGHT

## 2020-04-25 ASSESSMENT — PAIN DESCRIPTION - ONSET: ONSET: ON-GOING

## 2020-04-25 NOTE — PROGRESS NOTES
Topical BID    pregabalin  200 mg Oral TID    senna  1 tablet Oral Daily    spironolactone  50 mg Oral QAM    tamsulosin  0.4 mg Oral Daily    enoxaparin  40 mg Subcutaneous Daily    insulin lispro  0-6 Units Subcutaneous TID     insulin lispro  0-3 Units Subcutaneous Nightly     Continuous Infusions:   HYDROmorphone      sodium chloride Stopped (20 0917)    sodium chloride 75 mL/hr at 20 0405    dextrose       PRN Meds:acetaminophen, naloxone, calcium carbonate, oxyCODONE **OR** oxyCODONE, labetalol, sodium chloride flush, tiZANidine, magnesium hydroxide, promethazine **OR** ondansetron, glucose, dextrose, glucagon (rDNA), dextrose    OBJECTIVE:  BP (!) 152/62   Pulse 62   Temp 96.6 °F (35.9 °C)   Resp 14   Ht 6' 2\" (1.88 m)   Wt (!) 335 lb (152 kg)   SpO2 99%   BMI 43.01 kg/m²   Temp  Av.4 °F (32.4 °C)  Min: 32 °F (0 °C)  Max: 98.9 °F (37.2 °C)  Constitutional: The patient is awake, alert, and oriented. Skin: Warm and dry. No rashes were noted. Pre op pictures              Postop    HEENT: Round and reactive pupils. Moist mucous membranes. No ulcerations or thrush. Neck: Supple to movements. Chest: No use of accessory muscles to breathe. Symmetrical expansion. No wheezing, crackles or rhonchi. Cardiovascular: S1 and S2 are rhythmic and regular. No murmurs appreciated. Abdomen: Positive bowel sounds to auscultation. Benign to palpation. No masses felt. No hepatosplenomegaly. Genitourinary: Male  Extremities: No clubbing, no cyanosis, no edema.   AKA on the right with extensive exploration and debridement of the right thigh--dressed with Ace wrap  Lines: peripheral    Laboratory and Tests Review:  Lab Results   Component Value Date    WBC 21.8 (H) 2020    WBC 11.9 (H) 2020    WBC 9.7 2020    HGB 7.9 (L) 2020    HCT 24.7 (L) 2020    MCV 82.9 2020     2020     Lab Results   Component Value Date    NEUTROABS 18.00 (H)

## 2020-04-26 LAB
ANION GAP SERPL CALCULATED.3IONS-SCNC: 8 MMOL/L (ref 7–16)
BUN BLDV-MCNC: 18 MG/DL (ref 8–23)
CALCIUM SERPL-MCNC: 9.1 MG/DL (ref 8.6–10.2)
CHLORIDE BLD-SCNC: 100 MMOL/L (ref 98–107)
CO2: 25 MMOL/L (ref 22–29)
CREAT SERPL-MCNC: 1.5 MG/DL (ref 0.7–1.2)
GFR AFRICAN AMERICAN: 57
GFR NON-AFRICAN AMERICAN: 57 ML/MIN/1.73
GLUCOSE BLD-MCNC: 130 MG/DL (ref 74–99)
HCT VFR BLD CALC: 22.6 % (ref 37–54)
HEMOGLOBIN: 7.2 G/DL (ref 12.5–16.5)
MCH RBC QN AUTO: 26.9 PG (ref 26–35)
MCHC RBC AUTO-ENTMCNC: 31.9 % (ref 32–34.5)
MCV RBC AUTO: 84.3 FL (ref 80–99.9)
METER GLUCOSE: 127 MG/DL (ref 74–99)
METER GLUCOSE: 128 MG/DL (ref 74–99)
METER GLUCOSE: 154 MG/DL (ref 74–99)
METER GLUCOSE: 160 MG/DL (ref 74–99)
PDW BLD-RTO: 14.7 FL (ref 11.5–15)
PLATELET # BLD: 235 E9/L (ref 130–450)
PMV BLD AUTO: 11.1 FL (ref 7–12)
POTASSIUM SERPL-SCNC: 4.5 MMOL/L (ref 3.5–5)
PROCALCITONIN: 0.21 NG/ML (ref 0–0.08)
RBC # BLD: 2.68 E12/L (ref 3.8–5.8)
SODIUM BLD-SCNC: 133 MMOL/L (ref 132–146)
WBC # BLD: 12.6 E9/L (ref 4.5–11.5)

## 2020-04-26 PROCEDURE — 2580000003 HC RX 258: Performed by: STUDENT IN AN ORGANIZED HEALTH CARE EDUCATION/TRAINING PROGRAM

## 2020-04-26 PROCEDURE — 85027 COMPLETE CBC AUTOMATED: CPT

## 2020-04-26 PROCEDURE — 2700000000 HC OXYGEN THERAPY PER DAY

## 2020-04-26 PROCEDURE — 80048 BASIC METABOLIC PNL TOTAL CA: CPT

## 2020-04-26 PROCEDURE — 36415 COLL VENOUS BLD VENIPUNCTURE: CPT

## 2020-04-26 PROCEDURE — 82962 GLUCOSE BLOOD TEST: CPT

## 2020-04-26 PROCEDURE — 94660 CPAP INITIATION&MGMT: CPT

## 2020-04-26 PROCEDURE — 84145 PROCALCITONIN (PCT): CPT

## 2020-04-26 PROCEDURE — 6360000002 HC RX W HCPCS: Performed by: STUDENT IN AN ORGANIZED HEALTH CARE EDUCATION/TRAINING PROGRAM

## 2020-04-26 PROCEDURE — 6370000000 HC RX 637 (ALT 250 FOR IP): Performed by: STUDENT IN AN ORGANIZED HEALTH CARE EDUCATION/TRAINING PROGRAM

## 2020-04-26 PROCEDURE — 1200000000 HC SEMI PRIVATE

## 2020-04-26 PROCEDURE — 6370000000 HC RX 637 (ALT 250 FOR IP): Performed by: INTERNAL MEDICINE

## 2020-04-26 RX ORDER — LACTULOSE 10 G/15ML
20 SOLUTION ORAL 2 TIMES DAILY
Status: DISCONTINUED | OUTPATIENT
Start: 2020-04-26 | End: 2020-05-02

## 2020-04-26 RX ADMIN — PREGABALIN 200 MG: 100 CAPSULE ORAL at 15:28

## 2020-04-26 RX ADMIN — DULOXETINE HYDROCHLORIDE 120 MG: 60 CAPSULE, DELAYED RELEASE ORAL at 10:16

## 2020-04-26 RX ADMIN — CEFEPIME 2 G: 2 INJECTION, POWDER, FOR SOLUTION INTRAVENOUS at 04:39

## 2020-04-26 RX ADMIN — MICONAZOLE NITRATE: 20.6 POWDER TOPICAL at 10:24

## 2020-04-26 RX ADMIN — HYOSCYAMINE SULFATE: 16 SOLUTION at 12:37

## 2020-04-26 RX ADMIN — AMLODIPINE BESYLATE 5 MG: 5 TABLET ORAL at 10:16

## 2020-04-26 RX ADMIN — LISINOPRIL 10 MG: 20 TABLET ORAL at 11:18

## 2020-04-26 RX ADMIN — ONDANSETRON HYDROCHLORIDE 4 MG: 2 SOLUTION INTRAMUSCULAR; INTRAVENOUS at 20:53

## 2020-04-26 RX ADMIN — ENOXAPARIN SODIUM 40 MG: 100 INJECTION SUBCUTANEOUS at 10:13

## 2020-04-26 RX ADMIN — Medication 10 ML: at 10:15

## 2020-04-26 RX ADMIN — INSULIN GLARGINE 15 UNITS: 100 INJECTION, SOLUTION SUBCUTANEOUS at 10:23

## 2020-04-26 RX ADMIN — LUBIPROSTONE 24 MCG: 24 CAPSULE, GELATIN COATED ORAL at 17:42

## 2020-04-26 RX ADMIN — PREGABALIN 200 MG: 100 CAPSULE ORAL at 10:14

## 2020-04-26 RX ADMIN — BISACODYL 10 MG: 5 TABLET, COATED ORAL at 10:14

## 2020-04-26 RX ADMIN — FERROUS SULFATE TAB 325 MG (65 MG ELEMENTAL FE) 325 MG: 325 (65 FE) TAB at 17:42

## 2020-04-26 RX ADMIN — CLONIDINE HYDROCHLORIDE 0.1 MG: 0.1 TABLET ORAL at 10:15

## 2020-04-26 RX ADMIN — INSULIN LISPRO 1 UNITS: 100 INJECTION, SOLUTION INTRAVENOUS; SUBCUTANEOUS at 12:37

## 2020-04-26 RX ADMIN — TAMSULOSIN HYDROCHLORIDE 0.4 MG: 0.4 CAPSULE ORAL at 10:14

## 2020-04-26 RX ADMIN — INSULIN GLARGINE 15 UNITS: 100 INJECTION, SOLUTION SUBCUTANEOUS at 21:35

## 2020-04-26 RX ADMIN — SPIRONOLACTONE 50 MG: 25 TABLET ORAL at 10:16

## 2020-04-26 RX ADMIN — CEFEPIME 2 G: 2 INJECTION, POWDER, FOR SOLUTION INTRAVENOUS at 20:51

## 2020-04-26 RX ADMIN — LUBIPROSTONE 24 MCG: 24 CAPSULE, GELATIN COATED ORAL at 10:15

## 2020-04-26 RX ADMIN — METOPROLOL TARTRATE 25 MG: 25 TABLET, FILM COATED ORAL at 10:14

## 2020-04-26 RX ADMIN — CEFEPIME 2 G: 2 INJECTION, POWDER, FOR SOLUTION INTRAVENOUS at 12:36

## 2020-04-26 RX ADMIN — CILOSTAZOL 100 MG: 100 TABLET ORAL at 10:16

## 2020-04-26 RX ADMIN — INSULIN LISPRO 1 UNITS: 100 INJECTION, SOLUTION INTRAVENOUS; SUBCUTANEOUS at 17:42

## 2020-04-26 RX ADMIN — CALCIUM CARBONATE (ANTACID) CHEW TAB 500 MG 500 MG: 500 CHEW TAB at 19:47

## 2020-04-26 RX ADMIN — ASPIRIN 81 MG 81 MG: 81 TABLET ORAL at 10:14

## 2020-04-26 RX ADMIN — DOCUSATE SODIUM 100 MG: 100 CAPSULE, LIQUID FILLED ORAL at 10:14

## 2020-04-26 ASSESSMENT — PAIN DESCRIPTION - ONSET
ONSET: ON-GOING
ONSET: AWAKENED FROM SLEEP

## 2020-04-26 ASSESSMENT — PAIN SCALES - GENERAL
PAINLEVEL_OUTOF10: 8
PAINLEVEL_OUTOF10: 7
PAINLEVEL_OUTOF10: 7
PAINLEVEL_OUTOF10: 0
PAINLEVEL_OUTOF10: 0
PAINLEVEL_OUTOF10: 4
PAINLEVEL_OUTOF10: 0

## 2020-04-26 ASSESSMENT — PAIN DESCRIPTION - PROGRESSION
CLINICAL_PROGRESSION: GRADUALLY IMPROVING
CLINICAL_PROGRESSION: GRADUALLY IMPROVING

## 2020-04-26 ASSESSMENT — PAIN DESCRIPTION - LOCATION
LOCATION: LEG
LOCATION: LEG

## 2020-04-26 ASSESSMENT — PAIN DESCRIPTION - ORIENTATION
ORIENTATION: RIGHT
ORIENTATION: RIGHT

## 2020-04-26 ASSESSMENT — PAIN - FUNCTIONAL ASSESSMENT
PAIN_FUNCTIONAL_ASSESSMENT: PREVENTS OR INTERFERES WITH MANY ACTIVE NOT PASSIVE ACTIVITIES
PAIN_FUNCTIONAL_ASSESSMENT: PREVENTS OR INTERFERES SOME ACTIVE ACTIVITIES AND ADLS

## 2020-04-26 ASSESSMENT — PAIN DESCRIPTION - PAIN TYPE
TYPE: SURGICAL PAIN

## 2020-04-26 ASSESSMENT — PAIN DESCRIPTION - FREQUENCY
FREQUENCY: INTERMITTENT
FREQUENCY: INTERMITTENT

## 2020-04-26 NOTE — PLAN OF CARE
Problem: Pain:  Goal: Pain level will decrease  Description: Pain level will decrease  Outcome: Met This Shift  Goal: Control of acute pain  Description: Control of acute pain  Outcome: Met This Shift     Problem: Falls - Risk of:  Goal: Will remain free from falls  Description: Will remain free from falls  Outcome: Met This Shift  Goal: Absence of physical injury  Description: Absence of physical injury  Outcome: Met This Shift

## 2020-04-26 NOTE — PROGRESS NOTES
Pulse (!) 40   Temp 98.9 °F (37.2 °C) (Temporal)   Resp 13   Ht 6' 2\" (1.88 m)   Wt (!) 335 lb (152 kg)   SpO2 100%   BMI 43.01 kg/m²     Intake/Output Summary (Last 24 hours) at 4/26/2020 9838  Last data filed at 4/25/2020 2224  Gross per 24 hour   Intake 120.2 ml   Output 1050 ml   Net -929.8 ml          Gen: awake, alert and oriented x3, no apparent distress  CVS: RRR  Resp: No increased work of breathing  Abd: Soft, non-tender, non-distended  R LE: Large right lower extremity wound down to the femur anteriorly, superior and lateral with some green staining on the dressings and fibrinous tissue          LABS:    Lab Results   Component Value Date    WBC 12.6 (H) 04/26/2020    HGB 7.2 (L) 04/26/2020    HCT 22.6 (L) 04/26/2020     04/26/2020    PROTIME 18.4 (H) 04/16/2020    INR 1.6 04/16/2020    APTT 31.6 04/16/2020    K 4.7 04/24/2020    BUN 19 04/24/2020    CREATININE 1.6 (H) 04/24/2020       A/P  60 y.o. male with right lower extremity chronic stump wound, groin wound infection sp 3/20 R iliofemoral embolectomy, deep femoral embolectomy, 4/17, 4/20, 4/21, 4/23 right lower extremity wound debridement     Diabetic diet as tolerated, add supplements secondary to poor PO intake   Increase bowel regimen  Continue PCA in addition to long acting pain medications, although he has only used it 3 times  ID consult appreciated- continue cefepime, leukocytosis resolved  Will plan for further debridement, femur resection, possible closure 4/28  Daily dressing changes to be done by resident   Awaiting AM labs- had mild COLE Cr 1.6.  AM I&O's pending    Electronically signed by Joan Andrade MD on 4/26/2020 at 6:14 AM     Pt seen and examined  Pain control better  Leukocytosis improving  Cr 1.5 - + uo 1550  Or planned for Tuesday    Gabriella Fermin

## 2020-04-26 NOTE — PROGRESS NOTES
Subjective: The patient is awake and alert. No acute events overnight. Denies chest pain, angina, SOB   Pain better controlled now on PCA pump  Still no bowel movement      Objective:    /69   Pulse 93   Temp 98.2 °F (36.8 °C) (Temporal)   Resp 15   Ht 6' 2\" (1.88 m)   Wt (!) 335 lb (152 kg)   SpO2 98%   BMI 43.01 kg/m²     In: 0.8 [I.V.:0.8]  Out: 1550     HEENT: NCAT,  PERRLA, No JVD  Heart:  RRR, no murmurs, gallops, or rubs.   Lungs:  CTA bilaterally, no wheeze, rales or rhonchi  Abd: bowel sounds present, nontender, nondistended, no masses  Extrem: Right thigh stump in dressing      Recent Labs     04/23/20  1528 04/24/20  0425 04/26/20  0458   WBC  --  21.8* 12.6*   HGB 8.9* 7.9* 7.2*   HCT 27.7* 24.7* 22.6*   PLT  --  297 235       Recent Labs     04/24/20  0425 04/26/20  0458    133   K 4.7 4.5    100   CO2 24 25   BUN 19 18   CREATININE 1.6* 1.5*   CALCIUM 8.6 9.1       Assessment:    Patient Active Problem List   Diagnosis    DM II (diabetes mellitus, type II), controlled ([address]    HTN (hypertension)    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)    Coagulopathy (HCC)    Moderate protein-calorie malnutrition (HCC)    PVD (peripheral vascular disease) (HCC)    Leukocytosis    Stage 3 chronic kidney disease (HCC)    Tobacco dependence    HLD (hyperlipidemia)    History of DVT (deep vein thrombosis)    Osteomyelitis (HCC)    S/P AKA (above knee amputation), right ([facility])    History of vascular surgery    Occlusion of common femoral artery (HCC)    Cellulitis, scrotum    Hyperglycemia due to type 2 diabetes mellitus ([address]    Critical lower limb ischemia    Gas gangrene of thigh ([address]    Vascular occlusion    Wound infection    Postoperative wound infection    Ischemic ulcer of right thigh with fat layer exposed ([address]    Ischemic ulcer of right thigh with necrosis of muscle (HCC)   Sepsis, present on admission Plan:    Admit to telemetry for evaluation of right thigh stump abscess-unhealed  S/p or again today 4/20 for further excisional debridements    or  4/21   Broad-spectrum IV antibiotic therapy with  cefepime  Surgical cultures from right stump with Pseudomonas and E. coli, currently on appropriate antibiotics-white count improved  infectious disease evaluation-appreciated  Vascular surgeryand plastics following   Resume home medications with appropriate dose adjustments  AC /at bedtime blood sugar checks with insulin sliding scale-adequately controlled  Adjust BP meds for accelerated hypertension-much better controlled  Adjust pain meds if needed-optimally controlled   bowel regimen with zee lax, enema suppository prn - constipated dt immobility and narcotics   Pain regimen- now on PCA pump with IV Dilaudid-pain adequately controlled, will discontinue MS Contin  Hold insulin this morning secondary to patient not eating  A.m. labs      DVT Prophylaxis   PT/OT  Discharge planning       All consultants notes reviewed    Katelyn Abdul MD  11:39 AM  4/26/2020

## 2020-04-27 ENCOUNTER — TELEPHONE (OUTPATIENT)
Dept: SURGERY | Age: 65
End: 2020-04-27

## 2020-04-27 ENCOUNTER — ANESTHESIA EVENT (OUTPATIENT)
Dept: OPERATING ROOM | Age: 65
DRG: 463 | End: 2020-04-27
Payer: COMMERCIAL

## 2020-04-27 LAB
ABO/RH: NORMAL
ANTIBODY SCREEN: NORMAL
HCT VFR BLD CALC: 24 % (ref 37–54)
HEMOGLOBIN: 7.6 G/DL (ref 12.5–16.5)
INR BLD: 1.3
MCH RBC QN AUTO: 26.6 PG (ref 26–35)
MCHC RBC AUTO-ENTMCNC: 31.7 % (ref 32–34.5)
MCV RBC AUTO: 83.9 FL (ref 80–99.9)
METER GLUCOSE: 111 MG/DL (ref 74–99)
METER GLUCOSE: 117 MG/DL (ref 74–99)
METER GLUCOSE: 137 MG/DL (ref 74–99)
METER GLUCOSE: 141 MG/DL (ref 74–99)
PDW BLD-RTO: 14.7 FL (ref 11.5–15)
PLATELET # BLD: 211 E9/L (ref 130–450)
PMV BLD AUTO: 10.4 FL (ref 7–12)
PROTHROMBIN TIME: 14.7 SEC (ref 9.3–12.4)
RBC # BLD: 2.86 E12/L (ref 3.8–5.8)
WBC # BLD: 13.3 E9/L (ref 4.5–11.5)

## 2020-04-27 PROCEDURE — 86923 COMPATIBILITY TEST ELECTRIC: CPT

## 2020-04-27 PROCEDURE — 85610 PROTHROMBIN TIME: CPT

## 2020-04-27 PROCEDURE — 1200000000 HC SEMI PRIVATE

## 2020-04-27 PROCEDURE — P9016 RBC LEUKOCYTES REDUCED: HCPCS

## 2020-04-27 PROCEDURE — 94660 CPAP INITIATION&MGMT: CPT

## 2020-04-27 PROCEDURE — 6360000002 HC RX W HCPCS: Performed by: STUDENT IN AN ORGANIZED HEALTH CARE EDUCATION/TRAINING PROGRAM

## 2020-04-27 PROCEDURE — 2580000003 HC RX 258: Performed by: STUDENT IN AN ORGANIZED HEALTH CARE EDUCATION/TRAINING PROGRAM

## 2020-04-27 PROCEDURE — 6370000000 HC RX 637 (ALT 250 FOR IP): Performed by: STUDENT IN AN ORGANIZED HEALTH CARE EDUCATION/TRAINING PROGRAM

## 2020-04-27 PROCEDURE — 86900 BLOOD TYPING SEROLOGIC ABO: CPT

## 2020-04-27 PROCEDURE — 94770 HC ETCO2 MONITOR DAILY: CPT

## 2020-04-27 PROCEDURE — 82962 GLUCOSE BLOOD TEST: CPT

## 2020-04-27 PROCEDURE — 94760 N-INVAS EAR/PLS OXIMETRY 1: CPT

## 2020-04-27 PROCEDURE — 36415 COLL VENOUS BLD VENIPUNCTURE: CPT

## 2020-04-27 PROCEDURE — 85027 COMPLETE CBC AUTOMATED: CPT

## 2020-04-27 PROCEDURE — 86901 BLOOD TYPING SEROLOGIC RH(D): CPT

## 2020-04-27 PROCEDURE — 86850 RBC ANTIBODY SCREEN: CPT

## 2020-04-27 PROCEDURE — 2700000000 HC OXYGEN THERAPY PER DAY

## 2020-04-27 RX ADMIN — FERROUS SULFATE TAB 325 MG (65 MG ELEMENTAL FE) 325 MG: 325 (65 FE) TAB at 17:09

## 2020-04-27 RX ADMIN — Medication 10 ML: at 20:38

## 2020-04-27 RX ADMIN — PREGABALIN 200 MG: 100 CAPSULE ORAL at 09:21

## 2020-04-27 RX ADMIN — BISACODYL 10 MG: 5 TABLET, COATED ORAL at 10:11

## 2020-04-27 RX ADMIN — INSULIN GLARGINE 15 UNITS: 100 INJECTION, SOLUTION SUBCUTANEOUS at 09:26

## 2020-04-27 RX ADMIN — PREGABALIN 200 MG: 100 CAPSULE ORAL at 20:35

## 2020-04-27 RX ADMIN — STANDARDIZED SENNA CONCENTRATE 8.6 MG: 8.6 TABLET ORAL at 20:35

## 2020-04-27 RX ADMIN — INSULIN LISPRO 1 UNITS: 100 INJECTION, SOLUTION INTRAVENOUS; SUBCUTANEOUS at 11:38

## 2020-04-27 RX ADMIN — METOPROLOL TARTRATE 25 MG: 25 TABLET, FILM COATED ORAL at 20:36

## 2020-04-27 RX ADMIN — CLONIDINE HYDROCHLORIDE 0.1 MG: 0.1 TABLET ORAL at 20:34

## 2020-04-27 RX ADMIN — SODIUM CHLORIDE: 9 INJECTION, SOLUTION INTRAVENOUS at 17:07

## 2020-04-27 RX ADMIN — OXYCODONE HYDROCHLORIDE 10 MG: 10 TABLET ORAL at 06:30

## 2020-04-27 RX ADMIN — DOCUSATE SODIUM 100 MG: 100 CAPSULE, LIQUID FILLED ORAL at 20:35

## 2020-04-27 RX ADMIN — METOPROLOL TARTRATE 25 MG: 25 TABLET, FILM COATED ORAL at 09:21

## 2020-04-27 RX ADMIN — ATORVASTATIN CALCIUM 10 MG: 10 TABLET, FILM COATED ORAL at 20:35

## 2020-04-27 RX ADMIN — CILOSTAZOL 100 MG: 100 TABLET ORAL at 09:21

## 2020-04-27 RX ADMIN — BISACODYL 10 MG: 5 TABLET, COATED ORAL at 20:35

## 2020-04-27 RX ADMIN — CEFEPIME 2 G: 2 INJECTION, POWDER, FOR SOLUTION INTRAVENOUS at 03:51

## 2020-04-27 RX ADMIN — LUBIPROSTONE 24 MCG: 24 CAPSULE, GELATIN COATED ORAL at 08:22

## 2020-04-27 RX ADMIN — MICONAZOLE NITRATE: 20.6 POWDER TOPICAL at 09:22

## 2020-04-27 RX ADMIN — ENOXAPARIN SODIUM 40 MG: 100 INJECTION SUBCUTANEOUS at 10:10

## 2020-04-27 RX ADMIN — CEFEPIME 2 G: 2 INJECTION, POWDER, FOR SOLUTION INTRAVENOUS at 11:33

## 2020-04-27 RX ADMIN — MICONAZOLE NITRATE: 20.6 POWDER TOPICAL at 20:37

## 2020-04-27 RX ADMIN — CEFEPIME 2 G: 2 INJECTION, POWDER, FOR SOLUTION INTRAVENOUS at 19:33

## 2020-04-27 RX ADMIN — CLONIDINE HYDROCHLORIDE 0.1 MG: 0.1 TABLET ORAL at 09:21

## 2020-04-27 RX ADMIN — SPIRONOLACTONE 50 MG: 25 TABLET ORAL at 08:22

## 2020-04-27 RX ADMIN — FERROUS SULFATE TAB 325 MG (65 MG ELEMENTAL FE) 325 MG: 325 (65 FE) TAB at 08:22

## 2020-04-27 RX ADMIN — Medication: at 07:05

## 2020-04-27 RX ADMIN — DULOXETINE HYDROCHLORIDE 120 MG: 60 CAPSULE, DELAYED RELEASE ORAL at 08:23

## 2020-04-27 RX ADMIN — AMLODIPINE BESYLATE 5 MG: 5 TABLET ORAL at 09:21

## 2020-04-27 ASSESSMENT — PAIN SCALES - GENERAL
PAINLEVEL_OUTOF10: 0
PAINLEVEL_OUTOF10: 7
PAINLEVEL_OUTOF10: 0
PAINLEVEL_OUTOF10: 5
PAINLEVEL_OUTOF10: 10

## 2020-04-27 ASSESSMENT — LIFESTYLE VARIABLES: SMOKING_STATUS: 1

## 2020-04-27 NOTE — H&P
([facility] 5/22/2018    DVT, lower extremity (HCC)     right leg     Gas [facility] thigh ([facility] 3/20/2020    Hyperlipidemia     Hypertension     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[facility]     PVD (peripheral vascular disease) ([facility]      Past Surgical History:    Past Surgical History:   Procedure Laterality Date    FEMORAL BYPASS      Right - [facility] [address] Right 3/20/2020    RIGHT LOWER EXTREMITY THROMBECTOMY, POSSIBLE ANGIOGRAM, POSSIBLE INTERVENTION, POSSIBLE BYPASS. performed by Cherie Hung MD at [address] Right 4/17/2020    RIGHT LEG DEBRIDEMENT INCISION AND DRAINAGE performed by Timmy Hodges MD at [address] Right 4/20/2020    RIGHT THIGH WOUND DRESSING CHANGE; DEBRIDEMENT, removal of muscle performed by Timmy Hodges MD at [address] Right 4/21/2020    RIGHT THIGH WOUND DRESSING CHANGE POSSIBLE  DEBRIDEMENT - NEEDS BEN Fernandes / Grace Duggan TO SEE performed by Des Montes De Oca MD at [address] Right 4/23/2020    RIGHT THIGH WOUND DRESSING CHANGE and DEBRIDEMENT performed by Cherie Hung MD at [address] [facility] Right 11/1/2018    AMPUTATION ABOVE KNEE RIGHT LEG performed by Cherie Hung MD at [address] [facility] Right 5/24/2018    RIGHT FOOT INCISION AND DRAINAGE WITH PARTIAL BONE RESECTION performed by Cher Valadez DPM at [address] [facility] OFFICE/OUTPT VISIT,PROCEDURE ONLY Right 8/3/2018    INCISION AND DRAINAGE MULTIPLE AREAS RIGHT FOOT WITH DEBRIDEMENT SOFT TISSUE performed by Cher Valadez DPM at [facility] Right     leg      Current Medications:   Current Facility-Administered Medications   Medication Dose Route Frequency Provider Last Rate Last Dose    lactulose (CHRONULAC) 10 GM/15ML solution 20 g  20 g Oral BID Shanice oH MD        bisacodyl (DULCOLAX) EC tablet 10 mg  10 mg Oral BID Shanice Ho MD   10 mg at 04/27/20 magnesium hydroxide (MILK OF MAGNESIA) 400 MG/5ML suspension 30 mL  30 mL Oral Daily PRN Gloriajean Bue, DO        promethazine (PHENERGAN) tablet 12.5 mg  12.5 mg Oral Q6H PRN Gloriajean Bue, DO        Or    ondansetron [facility]) injection 4 mg  4 mg Intravenous Q6H PRN Gloriajean Bue, DO   4 mg at 04/26/20 2053    enoxaparin (LOVENOX) injection 40 mg  40 mg Subcutaneous Daily Gloriajean Bue, DO   40 mg at 04/27/20 1010    insulin lispro (HUMALOG) injection vial 0-6 Units  0-6 Units Subcutaneous TID WC Gloriajean Bue, DO   1 Units at 04/27/20 1138    insulin lispro (HUMALOG) injection vial 0-3 Units  0-3 Units Subcutaneous Nightly Gloriajean Bue, DO   1 Units at 04/25/20 2056    glucose (GLUTOSE) 40 % oral gel 15 g  15 g Oral PRN Gloriajean Bue, DO        dextrose 50 % IV solution  12.5 g Intravenous PRN Gloriajean Bue, DO        glucagon (rDNA) injection 1 mg  1 mg Intramuscular PRN Gloriajean Bue, DO        dextrose 5 % solution  100 mL/hr Intravenous PRN Gloriajean Bue, DO           Allergies:  Patient has no known allergies.     Social History:   Social History     Socioeconomic History    Marital status: Single     Spouse name: Not on file    Number of children: Not on file    Years of education: Not on file    Highest education level: Not on file   Occupational History    Not on file   Social Needs    Financial resource strain: Not on file    Food insecurity     Worry: Not on file     Inability: Not on file    Transportation needs     Medical: Not on file     Non-medical: Not on file   Tobacco Use    Smoking status: Current Every Day Smoker     Packs/day: 0.50     Years: 7.00     Pack years: 3.50     Types: Cigarettes    Smokeless tobacco: Never Used    Tobacco comment: 5-7 a day    Substance and Sexual Activity    Alcohol use: No    Drug use: No    Sexual activity: Not on file   Lifestyle    Physical activity     Days per week: Not on file     Minutes per session: Not on file    Stress: Not on file   Relationships    Social connections     Talks on phone: Not on file     Gets together: Not on file     Attends Taoism service: Not on file     Active member of club or organization: Not on file     Attends meetings of clubs or organizations: Not on file     Relationship status: Not on file    Intimate partner violence     Fear of current or ex partner: Not on file     Emotionally abused: Not on file     Physically abused: Not on file     Forced sexual activity: Not on file   Other Topics Concern    Not on file   Social History Narrative    Not on file     Family History:   Family History   Problem Relation Age of Onset    Cancer Mother     Diabetes Father     Heart Failure Father     Hypertension Father     Cancer Sister        REVIEW OF SYSTEMS:    CONSTITUTIONAL:  negative for  fevers, chills, sweats and fatigue  EYES: negative for dipolpia or acute vision loss. RESPIRATORY:  negative for  dry cough, cough with sputum, dyspnea, wheezing and chest pain  HENT:negative for pain, headache, difficulty swallowing or nose bleeds. CARDIOVASCULAR:  negative for  chest pain, dyspnea, palpitations, syncope  GASTROINTESTINAL:  negative for nausea, vomiting, change in bowel habits, diarrhea, and constipation   EXTREMITIES: negative for edema  MUSCULOSKELETAL: negative for muscle weakness  SKIN: negative for itching or rashes.   HEME: negative for easy brusing or bleeding  BEHAVIOR/PSYCH:  negative for poor appetite, increased appetite, decreased sleep and poor concentration      VITALS:  BP (!) 148/67   Pulse 84   Temp 97.8 °F (36.6 °C) (Temporal)   Resp 9   Ht 6' 2\" (1.88 m)   Wt (!) 335 lb (152 kg)   SpO2 100%   BMI 43.01 kg/m²   CONSTITUTIONAL:  awake, alert, cooperative, no apparent distress, and appears stated age  EYES: PERRLA, EOMI, no signs of occular infection  LUNGS:  No increased work of breathing, good air exchange  CARDIOVASCULAR:  regular rate

## 2020-04-27 NOTE — PROGRESS NOTES
(36.6 °C) (Temporal)   Resp 9   Ht 6' 2\" (1.88 m)   Wt (!) 335 lb (152 kg)   SpO2 100%   BMI 43.01 kg/m²     Intake/Output Summary (Last 24 hours) at 4/27/2020 1432  Last data filed at 4/27/2020 1429  Gross per 24 hour   Intake 241.2 ml   Output 2350 ml   Net -2108.8 ml          Gen: awake, alert and oriented x3, no apparent distress  CVS: RRR  Resp: No increased work of breathing  Abd: Soft, non-tender, non-distended  R LE: Dressing in place, done by resident this morning     LABS:    Lab Results   Component Value Date    WBC 13.3 (H) 04/27/2020    HGB 7.6 (L) 04/27/2020    HCT 24.0 (L) 04/27/2020     04/27/2020    PROTIME 18.4 (H) 04/16/2020    INR 1.6 04/16/2020    APTT 31.6 04/16/2020    K 4.5 04/26/2020    BUN 18 04/26/2020    CREATININE 1.5 (H) 04/26/2020       A/P  60 y.o. male with right lower extremity chronic stump wound, groin wound infection sp 3/20 R iliofemoral embolectomy, deep femoral embolectomy, 4/17, 4/20, 4/21, 4/23 right lower extremity wound debridement     · Diabetic diet as tolerated, add supplements secondary to poor PO intake   · Increase bowel regimen  · Continue PCA in addition to long acting pain medications  ID consult appreciated- continue cefepime  · Will plan for further debridement, femur resection, possible closure 4/28   · Daily dressing changes to be done by resident     Plan reviewed with Dr. Courtney Garcia.      Electronically signed by SHONDA Osborn CNP on 4/27/2020 at 2:32 PM

## 2020-04-27 NOTE — TELEPHONE ENCOUNTER
Joint case with  patient is an inpatient at [address]  Procedure:  Myocutaneous local tissue rearrangement of right above knee amputation wound  scheduled

## 2020-04-27 NOTE — ANESTHESIA PRE PROCEDURE
Department of Anesthesiology  Preprocedure Note       Name:  Hilda De Leon   Age:  60 y.o.  :  1955                                          MRN:  [id number]         Date:  2020      Surgeon: Sylvia Knapp):  Lam Alberts MD    Procedure: DEBRIDEMENT OF AMPUTATION RIGHT ABOVE KNEE,  POSS. ABOVE KNEE REVISION, POSS. CLOSURE, POSS.WOUND VAC -- NEEDS DR. FRIAS TO SEE (Right )    Medications prior to admission:   Prior to Admission medications    Medication Sig Start Date End Date Taking? Authorizing Provider   acetaminophen (TYLENOL) 325 MG tablet Take 650 mg by mouth every 4 hours as needed for Pain or Fever   Yes Historical Provider, MD   acetaminophen (TYLENOL) 500 MG tablet Take 500 mg by mouth every 4 hours as needed for Pain   Yes Historical Provider, MD   bacitracin 500 UNIT/GM ointment Apply topically 2 times daily Apply topically to groin BID   Yes Historical Provider, MD   bisacodyl (DULCOLAX) 10 MG suppository Place 10 mg rectally daily as needed for Constipation   Yes Historical Provider, MD   oxyCODONE (OXYCONTIN) 10 MG extended release tablet Take 10 mg by mouth every 4 hours as needed for Pain. Yes Historical Provider, MD   promethazine (PHENERGAN) 12.5 MG tablet Take 12.5 mg by mouth every 6 hours as needed for Nausea   Yes Historical Provider, MD   traZODone (DESYREL) 50 MG tablet Take 50 mg by mouth nightly   Yes Historical Provider, MD   apixaban (ELIQUIS) 5 MG TABS tablet Take 1 tablet by mouth 2 times daily 3/26/20  Yes Major Splinter, MD   docusate sodium (COLACE) 100 MG capsule Take 1 capsule by mouth 2 times daily 3/26/20  Yes Charly Splinter, MD   senna (SENOKOT) 8.6 MG TABS tablet Take 1 tablet by mouth daily 3/26/20  Yes Charly Splinter, MD   miconazole (MICOTIN) 2 % powder Apply topically 2 times daily.  3/26/20  Yes Jeremy Hassan MD   tamsulosin [facility]) 0.4 MG capsule Take 1 capsule by mouth daily 3/27/20  Yes Jeremy Hassan MD   DULoxetine (CYMBALTA) 60 MG extended release capsule TAKE 2 CAPSULES BY MOUTH EVERY MORNING 2/4/20  Yes Juanito Kline, DO   cloNIDine (CATAPRES) 0.1 MG tablet TAKE 1 TABLET BY MOUTH 3 TIMES A DAY 2/4/20  Yes Juanito Kline, DO   LANTUS 100 UNIT/ML injection vial INJECT 15 UNITS INTO THE SKIN TWO TIMES A DAY 2/4/20  Yes Juanito Kline, DO   lubiprostone (AMITIZA) 24 MCG capsule Take 1 capsule by mouth 2 times daily (with meals) 1/20/20  Yes Juanito Kline DO   metoprolol (LOPRESSOR) 100 MG tablet TAKE 1 TABLET BY MOUTH 2 TIMES A DAY 1/8/20  Yes Juanito Kline, DO   insulin lispro (HUMALOG) 100 UNIT/ML injection vial Inject 5 Units into the skin 3 times daily (with meals) 11/11/19  Yes Juanito Kline, DO   ferrous sulfate 325 (65 Fe) MG tablet Take 1 tablet by mouth 2 times daily (with meals) 11/14/18  Yes Mario Copeland MD   aspirin 81 MG chewable tablet Take 1 tablet by mouth daily 8/8/18  Yes Devang Green MD   lisinopril (PRINIVIL;ZESTRIL) 10 MG tablet Take 1 tablet by mouth daily Hold if sbp<140 8/7/18  Yes Devang Green MD   cilostazol (PLETAL) 100 MG tablet Take 100 mg by mouth 2 times daily   Yes Historical Provider, MD   pregabalin (LYRICA) 100 MG capsule Take 100 mg by mouth 2 times daily.     Yes Historical Provider, MD   spironolactone (ALDACTONE) 50 MG tablet Take 50 mg by mouth every morning    Yes Historical Provider, MD   atorvastatin (LIPITOR) 10 MG tablet Take 10 mg by mouth daily    Yes Historical Provider, MD   esomeprazole (NEXIUM) 40 MG delayed release capsule Take 40 mg by mouth every morning (before breakfast)   Yes Historical Provider, MD   ONE TOUCH ULTRA TEST strip USE TO TEST BLOOD SUGARS DAILY AS NEEDED 3/9/20   Juanito Kline, DO   Handicap Placard MISC by Does not apply route Patient cannot walk 200 ft without stopping to rest.    Expiration 10/21/2024 10/21/19   Juanito Kline DO   LANCETS MICRO THIN 31P MISC 1 applicator by Does not apply route daily 6/14/19   Juanito Kline, DO   Insulin Syringe-Needle senna (SENOKOT) tablet 8.6 mg  1 tablet Oral Daily Gloriajean Bue, DO   8.6 mg at 04/25/20 2035    spironolactone (ALDACTONE) tablet 50 mg  50 mg Oral QAM Gloriajean Bue, DO   50 mg at 04/27/20 9272    tamsulosin (FLOMAX) capsule 0.4 mg  0.4 mg Oral Daily Gloriajean Bue, DO   0.4 mg at 04/26/20 1014    tiZANidine (ZANAFLEX) tablet 4 mg  4 mg Oral Q8H PRN Gloriajean Bue, DO   4 mg at 04/24/20 2219    0.9 % sodium chloride infusion   Intravenous Continuous Gloriajean Bue, DO 75 mL/hr at 04/20/20 0405      magnesium hydroxide (MILK OF MAGNESIA) 400 MG/5ML suspension 30 mL  30 mL Oral Daily PRN Gloriajean Bue, DO        promethazine (PHENERGAN) tablet 12.5 mg  12.5 mg Oral Q6H PRN Gloriajean Bue, DO        Or    ondansetron [facility]) injection 4 mg  4 mg Intravenous Q6H PRN Gloriajean Bue, DO   4 mg at 04/26/20 2053    enoxaparin (LOVENOX) injection 40 mg  40 mg Subcutaneous Daily Gloriajean Bue, DO   40 mg at 04/27/20 1010    insulin lispro (HUMALOG) injection vial 0-6 Units  0-6 Units Subcutaneous TID WC Gloriajean Bue, DO   1 Units at 04/27/20 1138    insulin lispro (HUMALOG) injection vial 0-3 Units  0-3 Units Subcutaneous Nightly Gloriajean Bue, DO   1 Units at 04/25/20 2056    glucose (GLUTOSE) 40 % oral gel 15 g  15 g Oral PRN Gloriajean Bue, DO        dextrose 50 % IV solution  12.5 g Intravenous PRN Gloriajean Bue, DO        glucagon (rDNA) injection 1 mg  1 mg Intramuscular PRN Gloriajean Bue, DO        dextrose 5 % solution  100 mL/hr Intravenous PRN Gloriajean Bue, DO           Allergies:  No Known Allergies    Problem List:    Patient Active Problem List   Diagnosis Code    DM II (diabetes mellitus, type II), controlled ([facility] E11.9    HTN (hypertension) I10    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 ([facility] I70.65    Coagulopathy ([facility] D68.9    Moderate protein-calorie malnutrition (HCC) E44.0    PVD (peripheral vascular disease) ([address] I73.9    Leukocytosis D72.829    Stage 3 chronic kidney disease (HCC) N18.3    Tobacco dependence F17.200    HLD (hyperlipidemia) E78.5    History of DVT (deep vein thrombosis) Z86.718    Osteomyelitis (HCC) M86.9    S/P AKA (above knee amputation), right ([address] Z89.611    History of vascular surgery Z98.890    Occlusion of common femoral artery (HCC) 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 ([address] 5/21/2018    Critical lower limb ischemia 3/20/2020    Diabetes mellitus ([address]     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 2 diabetes mellitus, with fat layer exposed ([address] 5/22/2018    DVT, lower extremity (HCC)     right leg     Gas gangrene of thigh ([address] 3/20/2020    Hyperlipidemia     Hypertension     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[address]     PVD (peripheral vascular disease) ([address]        Past Surgical History:        Procedure Laterality Date    FEMORAL BYPASS      Right - [place], [address] [place] [address] 190 Right 3/20/2020    RIGHT LOWER EXTREMITY THROMBECTOMY, POSSIBLE ANGIOGRAM, POSSIBLE INTERVENTION, POSSIBLE BYPASS.  performed by Delaney Lloyd MD at [address] Right 4/17/2020    RIGHT LEG DEBRIDEMENT INCISION AND DRAINAGE performed by Olivia Newell MD at [address] Right 4/20/2020    RIGHT THIGH WOUND DRESSING CHANGE;

## 2020-04-27 NOTE — PROGRESS NOTES
Subjective: The patient is awake and alert. No acute events overnight. Denies chest pain, angina, SOB   Pain better controlled now on PCA pump  Still no bowel movement      Objective:    BP (!) 148/67   Pulse 84   Temp 97.8 °F (36.6 °C) (Temporal)   Resp 9   Ht 6' 2\" (1.88 m)   Wt (!) 335 lb (152 kg)   SpO2 100%   BMI 43.01 kg/m²     In: 240 [P.O.:240]  Out: 2350     HEENT: NCAT,  PERRLA, No JVD  Heart:  RRR, no murmurs, gallops, or rubs.   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)    Coagulopathy (HCC)    Moderate protein-calorie malnutrition (HCC)    PVD (peripheral vascular disease) (HCC)    Leukocytosis    Stage 3 chronic kidney disease (HCC)    Tobacco dependence    HLD (hyperlipidemia)    History of DVT (deep vein thrombosis)    Osteomyelitis (HCC)    S/P AKA (above knee amputation), right [facility]    History of vascular surgery    Occlusion of common femoral artery (HCC)    Cellulitis, scrotum    Hyperglycemia due to type 2 diabetes mellitus ([address]    Critical lower limb ischemia    Gas gangrene of thigh ([address]    Vascular occlusion    Wound infection    Postoperative wound infection    Ischemic ulcer of right thigh with fat layer exposed ([address]    Ischemic ulcer of right thigh with necrosis of muscle ([address]   Sepsis, present on admission     Plan:    Admit to telemetry for evaluation of right thigh stump abscess-unhealed  S/p OR 4/17, 4/20, 4/21, 4/23,  for further excisional debridements   Broad-spectrum IV antibiotic therapy with  cefepime  Surgical cultures from right stump with Pseudomonas and E. coli, currently on appropriate antibiotics-white count improved  infectious disease evaluation-appreciated  Vascular surgery and plastics following   Resume home medications with appropriate dose adjustments  AC /at bedtime blood sugar checks with insulin sliding scale-adequately controlled  Adjust BP meds for accelerated hypertension-much better controlled  Adjust pain meds if needed-optimally controlled   bowel regimen with zee lax, enema suppository prn - constipated dt immobility and narcotics   Pain regimen- now on PCA pump with IV Dilaudid-pain adequately controlled, will discontinue MS Contin    A.m. labs      DVT Prophylaxis   PT/OT  Discharge planning       All consultants notes reviewed    Lucinda Rudolph MD  12:22 PM  4/27/2020

## 2020-04-27 NOTE — CARE COORDINATION
Discharge plan is to return to [facility] is bed is available. For procedure today. Envelope in soft chart.

## 2020-04-27 NOTE — PROGRESS NOTES
Date: 2020       Patient Name: Jocy Barnes  : 1955      MRN: [id number]    Patient declining physical therapy services stating he wishes to wait until after next procedure. Pt educated on benefits of mobilization.    Will follow up as appropriate    Sylvester Davis PT, DPT  [id number]

## 2020-04-27 NOTE — PROGRESS NOTES
Patient states he is too nauseous to take his ngihtly pills. Zodran given and patient is still nauseous. Patient requesting to have CPAP on for the night.  RT is room applying CPAP and continious pulse ox to patient

## 2020-04-27 NOTE — ADT AUTH CERT
PHOTO FROM ID NOTE by Mario Fry RN         Review Status Review Entered   In Primary 4/23/2020 13:01       Criteria Review    PRE        POST         Cellulitis - Care Day 8 (4/23/2020) by Mario Fry RN         Review Status Review Entered   Completed 4/23/2020 12:58       Criteria Review      Care Day: 8 Care Date: 4/23/2020 Level of Care: Inpatient Floor    Guideline Day 3    Level Of Care    ( ) Floor to discharge [E]    4/23/2020 12:58 PM EDT by Jon Storm      no dc 4-23/ moved to med surg    Clinical Status    (X) * Hemodynamic stability    4/23/2020 12:58 PM EDT by Jon Storm      yes    (X) * Fever absent or improved    4/23/2020 12:58 PM EDT by Jon Storm      yes    ( ) * Skin exam stable or improved    4/23/2020 12:58 PM EDT by Jon Storm      debridement in or 4-21 and going back to or today, 4-23 for debridement , poss revision and wound vac application    (X) * Mental status at baseline    4/23/2020 12:58 PM EDT by Jon Storm      yes    ( ) * Antibiotic treatment needs appropriate for next level of care    ( ) * Pain absent or manageable at next level of care    4/23/2020 12:58 PM EDT by Jon Storm      pt recd 7 iv doses of dilaudid 0.5 mg yesterday and five iv doses of morphine 2 mg . // no total for 4-23, but is taking iv dilaudid and iv morphine similar to 4-22    ( ) * Discharge plans and education understood    Activity    ( ) * Ambulatory    Routes    ( ) * Oral hydration, medications, [F] and diet    Medications    ( ) Parenteral or oral antibiotics [B]    4/23/2020 12:58 PM EDT by Jon Storm      iv maxipime 2 gm q 8/ dapto 650mg q 24    * Milestone   Additional Notes    4-23>going to or at the time of this review   No new labs this am   04/23/20 1150  97.3 °F (36.3 °C)  85  14  113/63  100 %    IM NOTE FROM 4-22>Admit to telemetry for evaluation of right thigh stump abscess-unhealed   S/p or again
infection sp 3/20 R iliofemoral embolectomy, deep femoral embolectomy, 4/17, 4/20, 4/21, 4/23 right lower extremity wound debridement        Subjective   Pt s/e. States pain appropriately controlled. Afebrile. + nausea intermittently, no vomiting, + flatus, -BM, poor appetite, refused BR and supplements yesterday, more agreeable to BR todayBP 131/68   Pulse 82   Temp 96.5 °F (35.8 °C) (Temporal)   Resp 18   Ht 6' 2\" (1.88 m)   Wt (!) 335 lb (152 kg)   SpO2 100%   BMI 43.01 kg/m²     /P   60 y.o. male with right lower extremity chronic stump wound, groin wound infection sp 3/20 R iliofemoral embolectomy, deep femoral embolectomy, 4/17, 4/20, 4/21, 4/23 right lower extremity wound debridement        Diabetic diet as tolerated, add supplements secondary to poor PO intake-encouraged consumption   Increase bowel regimen, patient now willing to comply with meds   Continue PCA in addition to long acting pain medications   ID consult appreciated- continue cefepime, leukocytosis resolved   Will plan for further debridement, femur resection, possible closure 4/28   Daily dressing changes to be done by resident    Awaiting AM labs- COLE improving, UOP appropriate   NPO AM for procedure   ID NOTE 4-26>Microbiology:   Urine 4/19/2020-growth not present   Blood cultures 4/16/2020-NGTD   Right thigh surgical cultures 4/17/2020- Pseudomonas aeruginosa, E.  Coli, Klebsiella oxytoca   Right thigh surgical cultures debridement 4/21/2020- Gram stain shows few gram-negative rods, tissue cultures are still pending       ASSESSMENT:   · Ischemic necrosis skin subcutaneous tissue and down fascia with cultures growing Pseudomonas aeruginosa, E. coli and Klebsiella oxytoca   · Right lower extremity chronic stump wound, groin wound infection sp 3/20 R iliofemoral embolectomy, deep femoral embolectomy, 4/17, 4/21--Right lower extremity wound debridement    · Leukocytosis 21.9           PLAN:   · Continue cefepime IV 2 g every 8---Day-10

## 2020-04-27 NOTE — PROGRESS NOTES
NEUTROABS 8.46 (H) 04/22/2020    NEUTROABS 6.30 04/20/2020     No results found for: CRPHS  Lab Results   Component Value Date    ALT 26 04/24/2020    AST 19 04/24/2020    ALKPHOS 86 04/24/2020    BILITOT 0.3 04/24/2020     Lab Results   Component Value Date     04/26/2020    K 4.5 04/26/2020    K 5.3 04/17/2020     04/26/2020    CO2 25 04/26/2020    BUN 18 04/26/2020    CREATININE 1.5 04/26/2020    CREATININE 1.6 04/24/2020    CREATININE 1.3 04/22/2020    GFRAA 57 04/26/2020    LABGLOM 57 04/26/2020    GLUCOSE 130 04/26/2020    PROT 6.3 04/24/2020    LABALBU 2.2 04/24/2020    CALCIUM 9.1 04/26/2020    BILITOT 0.3 04/24/2020    ALKPHOS 86 04/24/2020    AST 19 04/24/2020    ALT 26 04/24/2020     Lab Results   Component Value Date    CRP 10.6 (H) 10/24/2018    CRP 2.1 (H) 06/25/2018    CRP 7.4 (H) 06/11/2018     Lab Results   Component Value Date    SEDRATE 128 (H) 10/24/2018    SEDRATE 77 (H) 06/25/2018    SEDRATE 138 (H) 06/11/2018     Radiology:      Microbiology:   Lab Results   Component Value Date    BC 5 Days- no growth 04/16/2020    BC 5 Days- no growth 03/20/2020    BC 5 Days- no growth 10/26/2018    ORG Escherichia coli 04/21/2020    ORG Pseudomonas aeruginosa 04/21/2020    ORG Klebsiella oxytoca 04/21/2020     Lab Results   Component Value Date    BLOODCULT2 5 Days- no growth 04/16/2020    BLOODCULT2 5 Days- no growth 03/20/2020    BLOODCULT2 5 Days- no growth 10/26/2018    ORG Escherichia coli 04/21/2020    ORG Pseudomonas aeruginosa 04/21/2020    ORG Klebsiella oxytoca 04/21/2020     WOUND/ABSCESS   Date Value Ref Range Status   04/16/2020 Heavy growth  Final   04/16/2020 Heavy growth  Final   04/16/2020 Heavy growth  Final     No results found for: RESPSMEAR  No results found for: MPNEUMO, CLAMYDCU, LABLEGI, AFBCX, FUNGSM, LABFUNG  No results found for: CULTRESP  No results found for: CXCATHTIP  No results found for: BFCS  Culture Surgical   Date Value Ref Range Status   04/21/2020 Moderate growth  Final   04/21/2020 Moderate growth  Final   04/21/2020 Moderate growth  Final     Urine Culture, Routine   Date Value Ref Range Status   04/19/2020 Growth not present  Final   03/20/2020 <10,000 CFU/mL  Gram negative rods   (A)  Final   03/20/2020 <10,000 CFU/ml  Final   03/20/2020 <10,000 CFU/ml  Final     No results found for: [facility]    Microbiology:  Urine 4/19/2020-growth not present  Blood cultures 4/16/2020-NGTD  Right thigh surgical cultures 4/17/2020- Pseudomonas aeruginosa, E.  Coli, Klebsiella oxytoca  Right thigh surgical cultures debridement 4/21/2020- Gram stain shows few gram-negative rods, tissue cultures are still pending    ASSESSMENT:  · Ischemic necrosis skin subcutaneous tissue and down fascia with cultures growing Pseudomonas aeruginosa, E. coli and Klebsiella oxytoca  · Right lower extremity chronic stump wound, groin wound infection sp 3/20 R iliofemoral embolectomy, deep femoral embolectomy, 4/17, 4/21--Right lower extremity wound debridement   · Leukocytosis 13-improving he is eating pretty well now       PLAN:  · Continue cefepime IV 2 g every 8---Day-10  · Follow cultures 04/21/20  · Monitor labs  · Orthopedics, vascular, plastic surgery following        Vernestine Si  2:14 PM  4/27/2020

## 2020-04-28 ENCOUNTER — ANESTHESIA (OUTPATIENT)
Dept: OPERATING ROOM | Age: 65
DRG: 463 | End: 2020-04-28
Payer: COMMERCIAL

## 2020-04-28 VITALS — DIASTOLIC BLOOD PRESSURE: 78 MMHG | OXYGEN SATURATION: 100 % | TEMPERATURE: 96.3 F | SYSTOLIC BLOOD PRESSURE: 126 MMHG

## 2020-04-28 LAB
ALBUMIN SERPL-MCNC: 2.4 G/DL (ref 3.5–5.2)
ALP BLD-CCNC: 80 U/L (ref 40–129)
ALT SERPL-CCNC: 22 U/L (ref 0–40)
ANION GAP SERPL CALCULATED.3IONS-SCNC: 11 MMOL/L (ref 7–16)
ANION GAP SERPL CALCULATED.3IONS-SCNC: 12 MMOL/L (ref 7–16)
AST SERPL-CCNC: 16 U/L (ref 0–39)
BASOPHILS ABSOLUTE: 0.09 E9/L (ref 0–0.2)
BASOPHILS RELATIVE PERCENT: 0.5 % (ref 0–2)
BILIRUB SERPL-MCNC: 0.5 MG/DL (ref 0–1.2)
BLOOD BANK DISPENSE STATUS: NORMAL
BLOOD BANK DISPENSE STATUS: NORMAL
BLOOD BANK PRODUCT CODE: NORMAL
BLOOD BANK PRODUCT CODE: NORMAL
BPU ID: NORMAL
BPU ID: NORMAL
BUN BLDV-MCNC: 15 MG/DL (ref 8–23)
BUN BLDV-MCNC: 17 MG/DL (ref 8–23)
CALCIUM SERPL-MCNC: 9 MG/DL (ref 8.6–10.2)
CALCIUM SERPL-MCNC: 9.4 MG/DL (ref 8.6–10.2)
CHLORIDE BLD-SCNC: 102 MMOL/L (ref 98–107)
CHLORIDE BLD-SCNC: 103 MMOL/L (ref 98–107)
CO2: 22 MMOL/L (ref 22–29)
CO2: 25 MMOL/L (ref 22–29)
CREAT SERPL-MCNC: 1.4 MG/DL (ref 0.7–1.2)
CREAT SERPL-MCNC: 1.4 MG/DL (ref 0.7–1.2)
DESCRIPTION BLOOD BANK: NORMAL
DESCRIPTION BLOOD BANK: NORMAL
EOSINOPHILS ABSOLUTE: 0.07 E9/L (ref 0.05–0.5)
EOSINOPHILS RELATIVE PERCENT: 0.4 % (ref 0–6)
GFR AFRICAN AMERICAN: >60
GFR AFRICAN AMERICAN: >60
GFR NON-AFRICAN AMERICAN: >60 ML/MIN/1.73
GFR NON-AFRICAN AMERICAN: >60 ML/MIN/1.73
GLUCOSE BLD-MCNC: 181 MG/DL (ref 74–99)
GLUCOSE BLD-MCNC: 98 MG/DL (ref 74–99)
HCT VFR BLD CALC: 24.4 % (ref 37–54)
HCT VFR BLD CALC: 27 % (ref 37–54)
HEMOGLOBIN: 7.8 G/DL (ref 12.5–16.5)
HEMOGLOBIN: 8.7 G/DL (ref 12.5–16.5)
IMMATURE GRANULOCYTES #: 0.2 E9/L
IMMATURE GRANULOCYTES %: 1 % (ref 0–5)
LYMPHOCYTES ABSOLUTE: 1.67 E9/L (ref 1.5–4)
LYMPHOCYTES RELATIVE PERCENT: 8.5 % (ref 20–42)
MCH RBC QN AUTO: 26.4 PG (ref 26–35)
MCH RBC QN AUTO: 27 PG (ref 26–35)
MCHC RBC AUTO-ENTMCNC: 32 % (ref 32–34.5)
MCHC RBC AUTO-ENTMCNC: 32.2 % (ref 32–34.5)
MCV RBC AUTO: 82.7 FL (ref 80–99.9)
MCV RBC AUTO: 83.9 FL (ref 80–99.9)
METER GLUCOSE: 137 MG/DL (ref 74–99)
METER GLUCOSE: 203 MG/DL (ref 74–99)
METER GLUCOSE: 244 MG/DL (ref 74–99)
MONOCYTES ABSOLUTE: 0.96 E9/L (ref 0.1–0.95)
MONOCYTES RELATIVE PERCENT: 4.9 % (ref 2–12)
NEUTROPHILS ABSOLUTE: 16.7 E9/L (ref 1.8–7.3)
NEUTROPHILS RELATIVE PERCENT: 84.7 % (ref 43–80)
PDW BLD-RTO: 14.6 FL (ref 11.5–15)
PDW BLD-RTO: 14.6 FL (ref 11.5–15)
PLATELET # BLD: 224 E9/L (ref 130–450)
PLATELET # BLD: 231 E9/L (ref 130–450)
PMV BLD AUTO: 10.8 FL (ref 7–12)
PMV BLD AUTO: 11.1 FL (ref 7–12)
POTASSIUM SERPL-SCNC: 4.3 MMOL/L (ref 3.5–5)
POTASSIUM SERPL-SCNC: 4.4 MMOL/L (ref 3.5–5)
PROCALCITONIN: 0.18 NG/ML (ref 0–0.08)
RBC # BLD: 2.95 E12/L (ref 3.8–5.8)
RBC # BLD: 3.22 E12/L (ref 3.8–5.8)
SODIUM BLD-SCNC: 136 MMOL/L (ref 132–146)
SODIUM BLD-SCNC: 139 MMOL/L (ref 132–146)
TOTAL PROTEIN: 6.4 G/DL (ref 6.4–8.3)
WBC # BLD: 11.7 E9/L (ref 4.5–11.5)
WBC # BLD: 19.7 E9/L (ref 4.5–11.5)

## 2020-04-28 PROCEDURE — 0KBQ0ZZ EXCISION OF RIGHT UPPER LEG MUSCLE, OPEN APPROACH: ICD-10-PCS | Performed by: SURGERY

## 2020-04-28 PROCEDURE — 6360000002 HC RX W HCPCS: Performed by: STUDENT IN AN ORGANIZED HEALTH CARE EDUCATION/TRAINING PROGRAM

## 2020-04-28 PROCEDURE — 2700000000 HC OXYGEN THERAPY PER DAY

## 2020-04-28 PROCEDURE — 6360000002 HC RX W HCPCS: Performed by: ANESTHESIOLOGY

## 2020-04-28 PROCEDURE — 7100000000 HC PACU RECOVERY - FIRST 15 MIN: Performed by: SURGERY

## 2020-04-28 PROCEDURE — 6370000000 HC RX 637 (ALT 250 FOR IP): Performed by: INTERNAL MEDICINE

## 2020-04-28 PROCEDURE — 7100000001 HC PACU RECOVERY - ADDTL 15 MIN: Performed by: SURGERY

## 2020-04-28 PROCEDURE — 2580000003 HC RX 258: Performed by: STUDENT IN AN ORGANIZED HEALTH CARE EDUCATION/TRAINING PROGRAM

## 2020-04-28 PROCEDURE — 6370000000 HC RX 637 (ALT 250 FOR IP): Performed by: STUDENT IN AN ORGANIZED HEALTH CARE EDUCATION/TRAINING PROGRAM

## 2020-04-28 PROCEDURE — 85025 COMPLETE CBC W/AUTO DIFF WBC: CPT

## 2020-04-28 PROCEDURE — 82962 GLUCOSE BLOOD TEST: CPT

## 2020-04-28 PROCEDURE — 2580000003 HC RX 258

## 2020-04-28 PROCEDURE — 99222 1ST HOSP IP/OBS MODERATE 55: CPT | Performed by: PLASTIC SURGERY

## 2020-04-28 PROCEDURE — 6360000002 HC RX W HCPCS

## 2020-04-28 PROCEDURE — 3600000003 HC SURGERY LEVEL 3 BASE: Performed by: SURGERY

## 2020-04-28 PROCEDURE — 88311 DECALCIFY TISSUE: CPT

## 2020-04-28 PROCEDURE — 1200000000 HC SEMI PRIVATE

## 2020-04-28 PROCEDURE — 88304 TISSUE EXAM BY PATHOLOGIST: CPT

## 2020-04-28 PROCEDURE — 94660 CPAP INITIATION&MGMT: CPT

## 2020-04-28 PROCEDURE — 2580000003 HC RX 258: Performed by: PHYSICIAN ASSISTANT

## 2020-04-28 PROCEDURE — 3700000000 HC ANESTHESIA ATTENDED CARE: Performed by: SURGERY

## 2020-04-28 PROCEDURE — 85027 COMPLETE CBC AUTOMATED: CPT

## 2020-04-28 PROCEDURE — 3700000001 HC ADD 15 MINUTES (ANESTHESIA): Performed by: SURGERY

## 2020-04-28 PROCEDURE — 36415 COLL VENOUS BLD VENIPUNCTURE: CPT

## 2020-04-28 PROCEDURE — 2709999900 HC NON-CHARGEABLE SUPPLY: Performed by: SURGERY

## 2020-04-28 PROCEDURE — 80053 COMPREHEN METABOLIC PANEL: CPT

## 2020-04-28 PROCEDURE — 3600000013 HC SURGERY LEVEL 3 ADDTL 15MIN: Performed by: SURGERY

## 2020-04-28 PROCEDURE — 11044 DBRDMT BONE 1ST 20 SQ CM/<: CPT | Performed by: SURGERY

## 2020-04-28 PROCEDURE — 2500000003 HC RX 250 WO HCPCS

## 2020-04-28 PROCEDURE — 94770 HC ETCO2 MONITOR DAILY: CPT

## 2020-04-28 PROCEDURE — 80048 BASIC METABOLIC PNL TOTAL CA: CPT

## 2020-04-28 PROCEDURE — 11047 DBRDMT BONE EACH ADDL: CPT | Performed by: SURGERY

## 2020-04-28 PROCEDURE — 84145 PROCALCITONIN (PCT): CPT

## 2020-04-28 RX ORDER — MORPHINE SULFATE 2 MG/ML
2 INJECTION, SOLUTION INTRAMUSCULAR; INTRAVENOUS EVERY 5 MIN PRN
Status: DISCONTINUED | OUTPATIENT
Start: 2020-04-28 | End: 2020-04-28 | Stop reason: HOSPADM

## 2020-04-28 RX ORDER — KETAMINE HCL IN NACL, ISO-OSM 100MG/10ML
SYRINGE (ML) INJECTION PRN
Status: DISCONTINUED | OUTPATIENT
Start: 2020-04-28 | End: 2020-04-28 | Stop reason: SDUPTHER

## 2020-04-28 RX ORDER — SODIUM CHLORIDE 0.9 % (FLUSH) 0.9 %
10 SYRINGE (ML) INJECTION EVERY 12 HOURS SCHEDULED
Status: DISCONTINUED | OUTPATIENT
Start: 2020-04-28 | End: 2020-04-28 | Stop reason: HOSPADM

## 2020-04-28 RX ORDER — BISACODYL 10 MG
10 SUPPOSITORY, RECTAL RECTAL DAILY PRN
Status: DISCONTINUED | OUTPATIENT
Start: 2020-04-28 | End: 2020-05-04 | Stop reason: HOSPADM

## 2020-04-28 RX ORDER — PROMETHAZINE HYDROCHLORIDE 25 MG/ML
6.25 INJECTION, SOLUTION INTRAMUSCULAR; INTRAVENOUS EVERY 10 MIN PRN
Status: DISCONTINUED | OUTPATIENT
Start: 2020-04-28 | End: 2020-04-28 | Stop reason: HOSPADM

## 2020-04-28 RX ORDER — PHENYLEPHRINE HYDROCHLORIDE 10 MG/ML
INJECTION INTRAVENOUS PRN
Status: DISCONTINUED | OUTPATIENT
Start: 2020-04-28 | End: 2020-04-28 | Stop reason: SDUPTHER

## 2020-04-28 RX ORDER — SODIUM CHLORIDE 0.9 % (FLUSH) 0.9 %
10 SYRINGE (ML) INJECTION PRN
Status: DISCONTINUED | OUTPATIENT
Start: 2020-04-28 | End: 2020-04-28 | Stop reason: HOSPADM

## 2020-04-28 RX ORDER — HYDROCODONE BITARTRATE AND ACETAMINOPHEN 5; 325 MG/1; MG/1
1 TABLET ORAL PRN
Status: DISCONTINUED | OUTPATIENT
Start: 2020-04-28 | End: 2020-04-28 | Stop reason: HOSPADM

## 2020-04-28 RX ORDER — MEPERIDINE HYDROCHLORIDE 25 MG/ML
12.5 INJECTION INTRAMUSCULAR; INTRAVENOUS; SUBCUTANEOUS EVERY 5 MIN PRN
Status: DISCONTINUED | OUTPATIENT
Start: 2020-04-28 | End: 2020-04-28 | Stop reason: HOSPADM

## 2020-04-28 RX ORDER — HYDROCODONE BITARTRATE AND ACETAMINOPHEN 5; 325 MG/1; MG/1
2 TABLET ORAL PRN
Status: DISCONTINUED | OUTPATIENT
Start: 2020-04-28 | End: 2020-04-28 | Stop reason: HOSPADM

## 2020-04-28 RX ORDER — CEPHALEXIN 500 MG/1
500 CAPSULE ORAL 4 TIMES DAILY
Qty: 28 CAPSULE | Refills: 0 | Status: SHIPPED | OUTPATIENT
Start: 2020-04-28 | End: 2020-05-05

## 2020-04-28 RX ORDER — FENTANYL CITRATE 50 UG/ML
INJECTION, SOLUTION INTRAMUSCULAR; INTRAVENOUS PRN
Status: DISCONTINUED | OUTPATIENT
Start: 2020-04-28 | End: 2020-04-28 | Stop reason: SDUPTHER

## 2020-04-28 RX ORDER — SODIUM HYPOCHLORITE 5 MG/ML
SOLUTION TOPICAL ONCE
Status: DISCONTINUED | OUTPATIENT
Start: 2020-04-28 | End: 2020-05-04 | Stop reason: HOSPADM

## 2020-04-28 RX ORDER — PROPOFOL 10 MG/ML
INJECTION, EMULSION INTRAVENOUS PRN
Status: DISCONTINUED | OUTPATIENT
Start: 2020-04-28 | End: 2020-04-28 | Stop reason: SDUPTHER

## 2020-04-28 RX ORDER — MIDAZOLAM HYDROCHLORIDE 1 MG/ML
INJECTION INTRAMUSCULAR; INTRAVENOUS PRN
Status: DISCONTINUED | OUTPATIENT
Start: 2020-04-28 | End: 2020-04-28 | Stop reason: SDUPTHER

## 2020-04-28 RX ORDER — SODIUM CHLORIDE 9 MG/ML
INJECTION, SOLUTION INTRAVENOUS CONTINUOUS PRN
Status: DISCONTINUED | OUTPATIENT
Start: 2020-04-28 | End: 2020-04-28 | Stop reason: SDUPTHER

## 2020-04-28 RX ORDER — MORPHINE SULFATE 2 MG/ML
1 INJECTION, SOLUTION INTRAMUSCULAR; INTRAVENOUS EVERY 5 MIN PRN
Status: DISCONTINUED | OUTPATIENT
Start: 2020-04-28 | End: 2020-04-28 | Stop reason: HOSPADM

## 2020-04-28 RX ORDER — SODIUM CHLORIDE 9 MG/ML
INJECTION, SOLUTION INTRAVENOUS CONTINUOUS
Status: DISCONTINUED | OUTPATIENT
Start: 2020-04-28 | End: 2020-04-29

## 2020-04-28 RX ORDER — 0.9 % SODIUM CHLORIDE 0.9 %
250 INTRAVENOUS SOLUTION INTRAVENOUS ONCE
Status: DISCONTINUED | OUTPATIENT
Start: 2020-04-28 | End: 2020-04-28

## 2020-04-28 RX ORDER — ONDANSETRON 4 MG/1
4 TABLET, FILM COATED ORAL DAILY PRN
Qty: 12 TABLET | Refills: 1 | Status: ON HOLD
Start: 2020-04-28 | End: 2020-06-23 | Stop reason: HOSPADM

## 2020-04-28 RX ADMIN — PHENYLEPHRINE HYDROCHLORIDE 200 MCG: 10 INJECTION INTRAVENOUS at 11:29

## 2020-04-28 RX ADMIN — FENTANYL CITRATE 50 MCG: 50 INJECTION, SOLUTION INTRAMUSCULAR; INTRAVENOUS at 11:46

## 2020-04-28 RX ADMIN — PROPOFOL 200 MG: 10 INJECTION, EMULSION INTRAVENOUS at 11:20

## 2020-04-28 RX ADMIN — CEFEPIME 2 G: 2 INJECTION, POWDER, FOR SOLUTION INTRAVENOUS at 04:48

## 2020-04-28 RX ADMIN — MORPHINE SULFATE 2 MG: 2 INJECTION, SOLUTION INTRAMUSCULAR; INTRAVENOUS at 13:21

## 2020-04-28 RX ADMIN — SODIUM CHLORIDE: 9 INJECTION, SOLUTION INTRAVENOUS at 11:09

## 2020-04-28 RX ADMIN — MORPHINE SULFATE 2 MG: 2 INJECTION, SOLUTION INTRAMUSCULAR; INTRAVENOUS at 13:16

## 2020-04-28 RX ADMIN — TIZANIDINE 4 MG: 4 TABLET ORAL at 22:48

## 2020-04-28 RX ADMIN — CEFEPIME 2 G: 2 INJECTION, POWDER, FOR SOLUTION INTRAVENOUS at 22:42

## 2020-04-28 RX ADMIN — PHENYLEPHRINE HYDROCHLORIDE 100 MCG: 10 INJECTION INTRAVENOUS at 11:25

## 2020-04-28 RX ADMIN — SODIUM CHLORIDE: 9 INJECTION, SOLUTION INTRAVENOUS at 15:13

## 2020-04-28 RX ADMIN — CEFEPIME 2 G: 2 INJECTION, POWDER, FOR SOLUTION INTRAVENOUS at 12:01

## 2020-04-28 RX ADMIN — PHENYLEPHRINE HYDROCHLORIDE 100 MCG: 10 INJECTION INTRAVENOUS at 12:02

## 2020-04-28 RX ADMIN — LACTULOSE 20 G: 20 SOLUTION ORAL at 22:44

## 2020-04-28 RX ADMIN — CILOSTAZOL 100 MG: 100 TABLET ORAL at 22:45

## 2020-04-28 RX ADMIN — Medication 20 MG: at 11:20

## 2020-04-28 RX ADMIN — METOPROLOL TARTRATE 25 MG: 25 TABLET, FILM COATED ORAL at 22:45

## 2020-04-28 RX ADMIN — Medication: at 21:56

## 2020-04-28 RX ADMIN — BISACODYL 10 MG: 10 SUPPOSITORY RECTAL at 18:59

## 2020-04-28 RX ADMIN — CLONIDINE HYDROCHLORIDE 0.1 MG: 0.1 TABLET ORAL at 22:44

## 2020-04-28 RX ADMIN — METOPROLOL TARTRATE 25 MG: 25 TABLET, FILM COATED ORAL at 09:43

## 2020-04-28 RX ADMIN — INSULIN GLARGINE 15 UNITS: 100 INJECTION, SOLUTION SUBCUTANEOUS at 23:04

## 2020-04-28 RX ADMIN — FENTANYL CITRATE 50 MCG: 50 INJECTION, SOLUTION INTRAMUSCULAR; INTRAVENOUS at 11:20

## 2020-04-28 RX ADMIN — ATORVASTATIN CALCIUM 10 MG: 10 TABLET, FILM COATED ORAL at 22:44

## 2020-04-28 RX ADMIN — PREGABALIN 200 MG: 100 CAPSULE ORAL at 22:46

## 2020-04-28 RX ADMIN — INSULIN LISPRO 1 UNITS: 100 INJECTION, SOLUTION INTRAVENOUS; SUBCUTANEOUS at 23:04

## 2020-04-28 RX ADMIN — MIDAZOLAM 2 MG: 1 INJECTION INTRAMUSCULAR; INTRAVENOUS at 11:09

## 2020-04-28 RX ADMIN — MICONAZOLE NITRATE: 20.6 POWDER TOPICAL at 22:48

## 2020-04-28 RX ADMIN — HYDROMORPHONE HYDROCHLORIDE 0.5 MG: 1 INJECTION, SOLUTION INTRAMUSCULAR; INTRAVENOUS; SUBCUTANEOUS at 13:31

## 2020-04-28 RX ADMIN — FENTANYL CITRATE 50 MCG: 50 INJECTION, SOLUTION INTRAMUSCULAR; INTRAVENOUS at 13:05

## 2020-04-28 RX ADMIN — HYDROMORPHONE HYDROCHLORIDE 0.5 MG: 1 INJECTION, SOLUTION INTRAMUSCULAR; INTRAVENOUS; SUBCUTANEOUS at 13:26

## 2020-04-28 ASSESSMENT — PULMONARY FUNCTION TESTS
PIF_VALUE: 16
PIF_VALUE: 22
PIF_VALUE: 16
PIF_VALUE: 22
PIF_VALUE: 16
PIF_VALUE: 4
PIF_VALUE: 2
PIF_VALUE: 22
PIF_VALUE: 4
PIF_VALUE: 16
PIF_VALUE: 22
PIF_VALUE: 16
PIF_VALUE: 16
PIF_VALUE: 22
PIF_VALUE: 19
PIF_VALUE: 16
PIF_VALUE: 0
PIF_VALUE: 23
PIF_VALUE: 20
PIF_VALUE: 8
PIF_VALUE: 16
PIF_VALUE: 23
PIF_VALUE: 20
PIF_VALUE: 16
PIF_VALUE: 36
PIF_VALUE: 16
PIF_VALUE: 1
PIF_VALUE: 1
PIF_VALUE: 16
PIF_VALUE: 23
PIF_VALUE: 24
PIF_VALUE: 16
PIF_VALUE: 22
PIF_VALUE: 0
PIF_VALUE: 16
PIF_VALUE: 23
PIF_VALUE: 22
PIF_VALUE: 16
PIF_VALUE: 20
PIF_VALUE: 1
PIF_VALUE: 22
PIF_VALUE: 1
PIF_VALUE: 15
PIF_VALUE: 15
PIF_VALUE: 20
PIF_VALUE: 16
PIF_VALUE: 22
PIF_VALUE: 4
PIF_VALUE: 27
PIF_VALUE: 1
PIF_VALUE: 24
PIF_VALUE: 16
PIF_VALUE: 24
PIF_VALUE: 23
PIF_VALUE: 16
PIF_VALUE: 11
PIF_VALUE: 15
PIF_VALUE: 23
PIF_VALUE: 1
PIF_VALUE: 15
PIF_VALUE: 23
PIF_VALUE: 16
PIF_VALUE: 22
PIF_VALUE: 23
PIF_VALUE: 16
PIF_VALUE: 22
PIF_VALUE: 16
PIF_VALUE: 21
PIF_VALUE: 14
PIF_VALUE: 23
PIF_VALUE: 24
PIF_VALUE: 23
PIF_VALUE: 16
PIF_VALUE: 16
PIF_VALUE: 0
PIF_VALUE: 23
PIF_VALUE: 0
PIF_VALUE: 16
PIF_VALUE: 0
PIF_VALUE: 16
PIF_VALUE: 23
PIF_VALUE: 11
PIF_VALUE: 0
PIF_VALUE: 22
PIF_VALUE: 0
PIF_VALUE: 22
PIF_VALUE: 16
PIF_VALUE: 15
PIF_VALUE: 0
PIF_VALUE: 21

## 2020-04-28 ASSESSMENT — PAIN DESCRIPTION - PAIN TYPE
TYPE: SURGICAL PAIN;PHANTOM PAIN
TYPE: PHANTOM PAIN;SURGICAL PAIN
TYPE: PHANTOM PAIN;SURGICAL PAIN
TYPE: SURGICAL PAIN;PHANTOM PAIN
TYPE: SURGICAL PAIN

## 2020-04-28 ASSESSMENT — PAIN SCALES - GENERAL
PAINLEVEL_OUTOF10: 10
PAINLEVEL_OUTOF10: 8
PAINLEVEL_OUTOF10: 10
PAINLEVEL_OUTOF10: 0
PAINLEVEL_OUTOF10: 10
PAINLEVEL_OUTOF10: 8
PAINLEVEL_OUTOF10: 3
PAINLEVEL_OUTOF10: 4
PAINLEVEL_OUTOF10: 8
PAINLEVEL_OUTOF10: 7
PAINLEVEL_OUTOF10: 8
PAINLEVEL_OUTOF10: 8
PAINLEVEL_OUTOF10: 0
PAINLEVEL_OUTOF10: 8
PAINLEVEL_OUTOF10: 8

## 2020-04-28 ASSESSMENT — PAIN DESCRIPTION - DESCRIPTORS
DESCRIPTORS: ACHING;BURNING;CONSTANT
DESCRIPTORS: ACHING;BURNING;CONSTANT
DESCRIPTORS: ACHING;BURNING;CONSTANT;SQUEEZING
DESCRIPTORS: ACHING;DISCOMFORT;SORE
DESCRIPTORS: ACHING;BURNING;CONSTANT

## 2020-04-28 ASSESSMENT — PAIN DESCRIPTION - ORIENTATION
ORIENTATION: RIGHT

## 2020-04-28 ASSESSMENT — PAIN DESCRIPTION - FREQUENCY
FREQUENCY: CONTINUOUS

## 2020-04-28 ASSESSMENT — PAIN DESCRIPTION - LOCATION
LOCATION: LEG

## 2020-04-28 NOTE — PROGRESS NOTES
Vascular Surgery Progress Note    Pt is being seen in f/u today regarding right lower extremity chronic stump wound, groin wound infection sp 3/20 R iliofemoral embolectomy, deep femoral embolectomy, 4/17, 4/20, 4/21, 4/23 right lower extremity wound debridement     Subjective  Pt s/e. States pain appropriately controlled. Afebrile.  Still poor PO intake, no issues overnight, no questions about procedure today    Current Medications:    HYDROmorphone      sodium chloride Stopped (04/27/20 1905)    sodium chloride 75 mL/hr at 04/20/20 0405    dextrose        acetaminophen, naloxone, calcium carbonate, oxyCODONE **OR** oxyCODONE, labetalol, sodium chloride flush, tiZANidine, magnesium hydroxide, promethazine **OR** ondansetron, glucose, dextrose, glucagon (rDNA), dextrose    lactulose  20 g Oral BID    bisacodyl  10 mg Oral BID    polyethylene glycol  17 g Oral Daily    sodium chloride  20 mL Intravenous Once    sodium hypochlorite   Irrigation Daily    cefepime  2 g Intravenous Q8H    sodium chloride flush  10 mL Intravenous 2 times per day    sodium chloride  25 mL Intravenous Q12H    amLODIPine  5 mg Oral Daily    aspirin  81 mg Oral Daily    atorvastatin  10 mg Oral Daily    cilostazol  100 mg Oral BID    cloNIDine  0.1 mg Oral BID    docusate sodium  100 mg Oral BID    DULoxetine  120 mg Oral QAM    pantoprazole  40 mg Oral QAM AC    ferrous sulfate  325 mg Oral BID WC    insulin glargine  15 Units Subcutaneous BID    lidocaine  1 patch Transdermal Daily    lisinopril  10 mg Oral Daily    lubiprostone  24 mcg Oral BID WC    metoprolol  25 mg Oral BID    miconazole   Topical BID    pregabalin  200 mg Oral TID    senna  1 tablet Oral Daily    spironolactone  50 mg Oral QAM    tamsulosin  0.4 mg Oral Daily    enoxaparin  40 mg Subcutaneous Daily    insulin lispro  0-6 Units Subcutaneous TID WC    insulin lispro  0-3 Units Subcutaneous Nightly        PHYSICAL EXAM:    /83

## 2020-04-28 NOTE — PROGRESS NOTES
[address] [place] [address] [facility]  Progress Note      Chief Complaint   Patient presents with    Wound Infection     right above knee amputation, drainage x 1 week       SUBJECTIVE:    Patient is tolerating medications. No reported adverse drug reactions. Denies any nausea, vomiting or diarrhea      Right lower extremity chronic stump wound, groin wound infection sp 3/20 R iliofemoral embolectomy, deep femoral embolectomy, 4/17 right lower extremity extensive debridement down to the muscle of right upper thigh, 04/20 right thigh necrosis excisional debridement of muscle, subcutaneous tissue and skin,  04/21, 04/23 -- excisional debridement of infected necrotic tissue, right groin and right thigh, including skin, subcutaneous tissue, fascia, and muscles.       Review of systems:  As stated above in the chief complaint, otherwise negative.     Medications:  Scheduled Meds:   lactulose  20 g Oral BID    bisacodyl  10 mg Oral BID    polyethylene glycol  17 g Oral Daily    sodium chloride  20 mL Intravenous Once    sodium hypochlorite   Irrigation Daily    cefepime  2 g Intravenous Q8H    sodium chloride flush  10 mL Intravenous 2 times per day    sodium chloride  25 mL Intravenous Q12H    amLODIPine  5 mg Oral Daily    aspirin  81 mg Oral Daily    atorvastatin  10 mg Oral Daily    cilostazol  100 mg Oral BID    cloNIDine  0.1 mg Oral BID    docusate sodium  100 mg Oral BID    DULoxetine  120 mg Oral QAM    pantoprazole  40 mg Oral QAM AC    ferrous sulfate  325 mg Oral BID WC    insulin glargine  15 Units Subcutaneous BID    lidocaine  1 patch Transdermal Daily    lisinopril  10 mg Oral Daily    lubiprostone  24 mcg Oral BID WC    metoprolol  25 mg Oral BID    miconazole   Topical BID    pregabalin  200 mg Oral TID    senna  1 tablet Oral Daily    spironolactone  50 mg Oral QAM    tamsulosin  0.4 mg Oral Daily    enoxaparin  40 mg Subcutaneous Daily    insulin lispro  0-6 Growth not present  Final   03/20/2020 <10,000 CFU/mL  Gram negative rods   (A)  Final   03/20/2020 <10,000 CFU/ml  Final   03/20/2020 <10,000 CFU/ml  Final     No results found for: [facility]    Microbiology:  Urine 4/19/2020-growth not present  Blood cultures 4/16/2020-NGTD  Right thigh surgical cultures 4/17/2020- Pseudomonas aeruginosa, E. Coli, Klebsiella oxytoca  Right thigh surgical cultures debridement 4/21/2020- Gram stain shows few gram-negative rods, tissue cultures are still pending    ASSESSMENT:  · Ischemic necrosis skin subcutaneous tissue and down fascia with cultures growing Pseudomonas aeruginosa, E. coli and Klebsiella oxytoca  · Right lower extremity chronic stump wound, groin wound infection sp 3/20 R iliofemoral embolectomy, deep femoral embolectomy, 4/17, 4/21,4/23--Right lower extremity wound debridement   · Leukocytosis 11 K -improving        PLAN:  · Continue cefepime IV 2 g every 8---Day-12 of 6-8 weeks if thigh is to be salvaged  · Reviewed cultures  · Monitor labs  · Patient will be going down to the OR for further debridement, femur resection, possible closure with myocutaneous rotation flap. · Orthopedics, vascular, plastic surgery following        Lashon Bedoya  10:55 AM  4/28/2020     Pt seen and examined. Above discussed agree with advanced practice nurse. Labs, cultures, and radiographs reviewed. Face to Face encounter occurred. Changes made as necessary.      Elder Doss MD

## 2020-04-28 NOTE — ANESTHESIA POSTPROCEDURE EVALUATION
Department of Anesthesiology  Postprocedure Note    Patient: Kael Luevano  MRN: [id number]  YOB: 1955  Date of evaluation: 4/28/2020  Time:  2:46 PM     Procedure Summary     Date:  04/28/20 Room / Location:  [place] [address] / [facility]    Anesthesia Start:  1109 Anesthesia Stop:      Procedure:  [address],  POSS. ABOVE KNEE REVISION, POSS. CLOSURE, POSS.WOUND VAC (Right ) Diagnosis:  (.)    Surgeon:  Mil Garcia MD Responsible Provider:  Luis Alberto Garibay MD    Anesthesia Type:  MAC ASA Status:  3          Anesthesia Type: MAC    Blaine Phase I: Blaine Score: 9    Blaine Phase II:      Last vitals: Reviewed and per EMR flowsheets.        Anesthesia Post Evaluation    Patient location during evaluation: PACU  Patient participation: complete - patient participated  Level of consciousness: awake  Pain score: 3  Airway patency: patent  Nausea & Vomiting: no nausea and no vomiting  Complications: no  Cardiovascular status: blood pressure returned to baseline  Respiratory status: acceptable  Hydration status: euvolemic

## 2020-04-29 ENCOUNTER — APPOINTMENT (OUTPATIENT)
Dept: GENERAL RADIOLOGY | Age: 65
DRG: 463 | End: 2020-04-29
Payer: COMMERCIAL

## 2020-04-29 ENCOUNTER — ANESTHESIA EVENT (OUTPATIENT)
Dept: OPERATING ROOM | Age: 65
DRG: 463 | End: 2020-04-29
Payer: COMMERCIAL

## 2020-04-29 LAB
ANION GAP SERPL CALCULATED.3IONS-SCNC: 10 MMOL/L (ref 7–16)
BUN BLDV-MCNC: 23 MG/DL (ref 8–23)
CALCIUM SERPL-MCNC: 8.8 MG/DL (ref 8.6–10.2)
CHLORIDE BLD-SCNC: 99 MMOL/L (ref 98–107)
CO2: 23 MMOL/L (ref 22–29)
CREAT SERPL-MCNC: 1.7 MG/DL (ref 0.7–1.2)
GFR AFRICAN AMERICAN: 50
GFR NON-AFRICAN AMERICAN: 50 ML/MIN/1.73
GLUCOSE BLD-MCNC: 205 MG/DL (ref 74–99)
HCT VFR BLD CALC: 24.1 % (ref 37–54)
HEMOGLOBIN: 7.8 G/DL (ref 12.5–16.5)
MCH RBC QN AUTO: 26.8 PG (ref 26–35)
MCHC RBC AUTO-ENTMCNC: 32.4 % (ref 32–34.5)
MCV RBC AUTO: 82.8 FL (ref 80–99.9)
METER GLUCOSE: 189 MG/DL (ref 74–99)
METER GLUCOSE: 193 MG/DL (ref 74–99)
METER GLUCOSE: 198 MG/DL (ref 74–99)
METER GLUCOSE: 215 MG/DL (ref 74–99)
PDW BLD-RTO: 14.6 FL (ref 11.5–15)
PLATELET # BLD: 247 E9/L (ref 130–450)
PMV BLD AUTO: 11.5 FL (ref 7–12)
POTASSIUM SERPL-SCNC: 5 MMOL/L (ref 3.5–5)
RBC # BLD: 2.91 E12/L (ref 3.8–5.8)
SODIUM BLD-SCNC: 132 MMOL/L (ref 132–146)
WBC # BLD: 24.4 E9/L (ref 4.5–11.5)

## 2020-04-29 PROCEDURE — 74018 RADEX ABDOMEN 1 VIEW: CPT

## 2020-04-29 PROCEDURE — 6360000002 HC RX W HCPCS: Performed by: STUDENT IN AN ORGANIZED HEALTH CARE EDUCATION/TRAINING PROGRAM

## 2020-04-29 PROCEDURE — 1200000000 HC SEMI PRIVATE

## 2020-04-29 PROCEDURE — 85027 COMPLETE CBC AUTOMATED: CPT

## 2020-04-29 PROCEDURE — 6370000000 HC RX 637 (ALT 250 FOR IP): Performed by: STUDENT IN AN ORGANIZED HEALTH CARE EDUCATION/TRAINING PROGRAM

## 2020-04-29 PROCEDURE — C1751 CATH, INF, PER/CENT/MIDLINE: HCPCS

## 2020-04-29 PROCEDURE — 71045 X-RAY EXAM CHEST 1 VIEW: CPT

## 2020-04-29 PROCEDURE — 94660 CPAP INITIATION&MGMT: CPT

## 2020-04-29 PROCEDURE — 76937 US GUIDE VASCULAR ACCESS: CPT

## 2020-04-29 PROCEDURE — 2580000003 HC RX 258: Performed by: STUDENT IN AN ORGANIZED HEALTH CARE EDUCATION/TRAINING PROGRAM

## 2020-04-29 PROCEDURE — 02HV33Z INSERTION OF INFUSION DEVICE INTO SUPERIOR VENA CAVA, PERCUTANEOUS APPROACH: ICD-10-PCS | Performed by: INTERNAL MEDICINE

## 2020-04-29 PROCEDURE — 2700000000 HC OXYGEN THERAPY PER DAY

## 2020-04-29 PROCEDURE — 36569 INSJ PICC 5 YR+ W/O IMAGING: CPT

## 2020-04-29 PROCEDURE — 6360000002 HC RX W HCPCS

## 2020-04-29 PROCEDURE — 36415 COLL VENOUS BLD VENIPUNCTURE: CPT

## 2020-04-29 PROCEDURE — 6360000002 HC RX W HCPCS: Performed by: INTERNAL MEDICINE

## 2020-04-29 PROCEDURE — 80048 BASIC METABOLIC PNL TOTAL CA: CPT

## 2020-04-29 PROCEDURE — 82962 GLUCOSE BLOOD TEST: CPT

## 2020-04-29 RX ORDER — HEPARIN SODIUM (PORCINE) LOCK FLUSH IV SOLN 100 UNIT/ML 100 UNIT/ML
3 SOLUTION INTRAVENOUS EVERY 12 HOURS SCHEDULED
Status: DISCONTINUED | OUTPATIENT
Start: 2020-04-29 | End: 2020-05-04 | Stop reason: HOSPADM

## 2020-04-29 RX ORDER — SODIUM CHLORIDE 0.9 % (FLUSH) 0.9 %
10 SYRINGE (ML) INJECTION PRN
Status: DISCONTINUED | OUTPATIENT
Start: 2020-04-29 | End: 2020-05-04 | Stop reason: HOSPADM

## 2020-04-29 RX ORDER — LIDOCAINE HYDROCHLORIDE 10 MG/ML
5 INJECTION, SOLUTION EPIDURAL; INFILTRATION; INTRACAUDAL; PERINEURAL ONCE
Status: DISCONTINUED | OUTPATIENT
Start: 2020-04-29 | End: 2020-05-04 | Stop reason: HOSPADM

## 2020-04-29 RX ORDER — HEPARIN SODIUM (PORCINE) LOCK FLUSH IV SOLN 100 UNIT/ML 100 UNIT/ML
3 SOLUTION INTRAVENOUS PRN
Status: DISCONTINUED | OUTPATIENT
Start: 2020-04-29 | End: 2020-05-04 | Stop reason: HOSPADM

## 2020-04-29 RX ORDER — SODIUM CHLORIDE 0.9 % (FLUSH) 0.9 %
10 SYRINGE (ML) INJECTION PRN
Status: DISCONTINUED | OUTPATIENT
Start: 2020-04-29 | End: 2020-04-29 | Stop reason: SDUPTHER

## 2020-04-29 RX ORDER — HEPARIN SODIUM (PORCINE) LOCK FLUSH IV SOLN 100 UNIT/ML 100 UNIT/ML
3 SOLUTION INTRAVENOUS EVERY 12 HOURS SCHEDULED
Status: DISCONTINUED | OUTPATIENT
Start: 2020-04-29 | End: 2020-04-29 | Stop reason: SDUPTHER

## 2020-04-29 RX ORDER — LIDOCAINE HYDROCHLORIDE 10 MG/ML
5 INJECTION, SOLUTION EPIDURAL; INFILTRATION; INTRACAUDAL; PERINEURAL ONCE
Status: DISCONTINUED | OUTPATIENT
Start: 2020-04-29 | End: 2020-04-29 | Stop reason: SDUPTHER

## 2020-04-29 RX ORDER — HEPARIN SODIUM (PORCINE) LOCK FLUSH IV SOLN 100 UNIT/ML 100 UNIT/ML
3 SOLUTION INTRAVENOUS PRN
Status: DISCONTINUED | OUTPATIENT
Start: 2020-04-29 | End: 2020-04-29 | Stop reason: SDUPTHER

## 2020-04-29 RX ADMIN — HYDROMORPHONE HYDROCHLORIDE: 1 INJECTION, SOLUTION INTRAMUSCULAR; INTRAVENOUS; SUBCUTANEOUS at 06:39

## 2020-04-29 RX ADMIN — SODIUM CHLORIDE, PRESERVATIVE FREE 300 UNITS: 5 INJECTION INTRAVENOUS at 21:40

## 2020-04-29 RX ADMIN — TRIMETHOBENZAMIDE HYDROCHLORIDE 200 MG: 100 INJECTION INTRAMUSCULAR at 11:38

## 2020-04-29 RX ADMIN — FERROUS SULFATE TAB 325 MG (65 MG ELEMENTAL FE) 325 MG: 325 (65 FE) TAB at 17:42

## 2020-04-29 RX ADMIN — OXYCODONE HYDROCHLORIDE 10 MG: 10 TABLET ORAL at 11:38

## 2020-04-29 RX ADMIN — DULOXETINE HYDROCHLORIDE 120 MG: 60 CAPSULE, DELAYED RELEASE ORAL at 09:08

## 2020-04-29 RX ADMIN — ONDANSETRON HYDROCHLORIDE 4 MG: 2 SOLUTION INTRAMUSCULAR; INTRAVENOUS at 06:39

## 2020-04-29 RX ADMIN — INSULIN LISPRO 2 UNITS: 100 INJECTION, SOLUTION INTRAVENOUS; SUBCUTANEOUS at 09:09

## 2020-04-29 RX ADMIN — CLONIDINE HYDROCHLORIDE 0.1 MG: 0.1 TABLET ORAL at 20:46

## 2020-04-29 RX ADMIN — BISACODYL 10 MG: 5 TABLET, COATED ORAL at 21:48

## 2020-04-29 RX ADMIN — PREGABALIN 200 MG: 100 CAPSULE ORAL at 20:47

## 2020-04-29 RX ADMIN — STANDARDIZED SENNA CONCENTRATE 8.6 MG: 8.6 TABLET ORAL at 21:48

## 2020-04-29 RX ADMIN — TAMSULOSIN HYDROCHLORIDE 0.4 MG: 0.4 CAPSULE ORAL at 09:08

## 2020-04-29 RX ADMIN — CEFEPIME 2 G: 2 INJECTION, POWDER, FOR SOLUTION INTRAVENOUS at 20:46

## 2020-04-29 RX ADMIN — LUBIPROSTONE 24 MCG: 24 CAPSULE, GELATIN COATED ORAL at 09:08

## 2020-04-29 RX ADMIN — INSULIN LISPRO 2 UNITS: 100 INJECTION, SOLUTION INTRAVENOUS; SUBCUTANEOUS at 20:47

## 2020-04-29 RX ADMIN — ENOXAPARIN SODIUM 40 MG: 100 INJECTION SUBCUTANEOUS at 09:09

## 2020-04-29 RX ADMIN — CILOSTAZOL 100 MG: 100 TABLET ORAL at 09:08

## 2020-04-29 RX ADMIN — INSULIN GLARGINE 15 UNITS: 100 INJECTION, SOLUTION SUBCUTANEOUS at 09:09

## 2020-04-29 RX ADMIN — SODIUM CHLORIDE 25 ML: 9 INJECTION, SOLUTION INTRAVENOUS at 06:14

## 2020-04-29 RX ADMIN — MICONAZOLE NITRATE: 20.6 POWDER TOPICAL at 21:40

## 2020-04-29 RX ADMIN — ONDANSETRON HYDROCHLORIDE 4 MG: 2 SOLUTION INTRAMUSCULAR; INTRAVENOUS at 17:41

## 2020-04-29 RX ADMIN — SODIUM CHLORIDE: 9 INJECTION, SOLUTION INTRAVENOUS at 02:53

## 2020-04-29 RX ADMIN — CEFEPIME 2 G: 2 INJECTION, POWDER, FOR SOLUTION INTRAVENOUS at 06:14

## 2020-04-29 RX ADMIN — DOCUSATE SODIUM 100 MG: 100 CAPSULE, LIQUID FILLED ORAL at 21:47

## 2020-04-29 RX ADMIN — INSULIN GLARGINE 15 UNITS: 100 INJECTION, SOLUTION SUBCUTANEOUS at 20:48

## 2020-04-29 RX ADMIN — INSULIN LISPRO 2 UNITS: 100 INJECTION, SOLUTION INTRAVENOUS; SUBCUTANEOUS at 17:41

## 2020-04-29 RX ADMIN — ATORVASTATIN CALCIUM 10 MG: 10 TABLET, FILM COATED ORAL at 20:46

## 2020-04-29 RX ADMIN — SODIUM CHLORIDE: 9 INJECTION, SOLUTION INTRAVENOUS at 09:00

## 2020-04-29 RX ADMIN — MICONAZOLE NITRATE: 20.6 POWDER TOPICAL at 14:34

## 2020-04-29 RX ADMIN — PREGABALIN 200 MG: 100 CAPSULE ORAL at 09:08

## 2020-04-29 RX ADMIN — METOPROLOL TARTRATE 25 MG: 25 TABLET, FILM COATED ORAL at 21:20

## 2020-04-29 RX ADMIN — Medication 10 ML: at 21:40

## 2020-04-29 RX ADMIN — SPIRONOLACTONE 50 MG: 25 TABLET ORAL at 09:08

## 2020-04-29 RX ADMIN — CLONIDINE HYDROCHLORIDE 0.1 MG: 0.1 TABLET ORAL at 09:08

## 2020-04-29 RX ADMIN — LUBIPROSTONE 24 MCG: 24 CAPSULE, GELATIN COATED ORAL at 17:42

## 2020-04-29 RX ADMIN — AMLODIPINE BESYLATE 5 MG: 5 TABLET ORAL at 09:08

## 2020-04-29 ASSESSMENT — PAIN SCALES - GENERAL
PAINLEVEL_OUTOF10: 10
PAINLEVEL_OUTOF10: 8
PAINLEVEL_OUTOF10: 0

## 2020-04-29 ASSESSMENT — LIFESTYLE VARIABLES: SMOKING_STATUS: 1

## 2020-04-29 NOTE — PLAN OF CARE
Problem: Increased nutrient needs (NI-5.1)  Goal: Food and/or Nutrient Delivery  Description: Individualized approach for food/nutrient provision.   4/29/2020 1034 by Carlo sA Solorio MS, RD, LD  Outcome: Met This Shift     Problem: Pain:  Goal: Pain level will decrease  Description: Pain level will decrease  Outcome: Ongoing  Goal: Control of acute pain  Description: Control of acute pain  Outcome: Ongoing  Goal: Control of chronic pain  Description: Control of chronic pain  Outcome: Ongoing

## 2020-04-29 NOTE — PROGRESS NOTES
Nutrition Assessment    Type and Reason for Visit: Reassess    Nutrition Recommendations: Continue current diet, Start Glucerna BID, Antoni BID    Nutrition Assessment: Pt continues to be nutritionally compromised w/ increased nutrient needs for surgical healing s/p multiple excisional debridements or necrotic stump wound. Will add Glucerna and Antoni BID to optimize PO intakes. Malnutrition Assessment:  · Malnutrition Status: Insufficient data  · Context: Chronic illness  · Findings of the 6 clinical characteristics of malnutrition (Minimum of 2 out of 6 clinical characteristics is required to make the diagnosis of moderate or severe Protein Calorie Malnutrition based on AND/ASPEN Guidelines):  1. Energy Intake-Less than or equal to 75% of estimated energy requirement, Greater than or equal to 7 days    2. Weight Loss-Unable to assess,    3. Fat Loss-Unable to assess,    4. Muscle Loss-Unable to assess,    5. Fluid Accumulation-No significant fluid accumulation,    6.  Strength-Not measured    Nutrition Risk Level:  Moderate    Nutrient Needs:  · Estimated Daily Total Kcal: 8947-9535(8-11 kcals/kg)  · Estimated Daily Protein (g): 145-180(2-2.5 gm/kg)  · Estimated Daily Total Fluid (ml/day): 6876-8794    Nutrition Diagnosis:   · Problem: Increased nutrient needs  · Etiology: related to Increased demand for energy/nutrients     Signs and symptoms:  as evidenced by Presence of wounds    Objective Information:  · Nutrition-Focused Physical Findings: BS absent, soft abd, noted N/V, R AKA, +1-2 edema, -I/Os  · Wound Type: Surgical Wound  · Current Nutrition Therapies:  · Oral Diet Orders: Carb Control 4 Carbs/Meal   · Oral Diet intake: 51-75%(avg per doc flow)  · Anthropometric Measures:  · Ht: 6' 2\" (188 cm)   · Current Body Wt: 335 lb (152 kg)(4/16 no method - UTO updated wt at this time)  · Usual Body Wt: 332 lb (150.6 kg)(3/2020 bed scale, per EMR)  · % Weight Change: unable to properly assess wt changes at

## 2020-04-29 NOTE — DISCHARGE INSTR - COC
listed and that he requires  LTAC for {GREATER/LESS:839873701} 30 days.      Update Admission H&P: {CHP DME Changes in YVTAR:264683670}    PHYSICIAN SIGNATURE:  {Esignature:531015733}

## 2020-04-29 NOTE — ANESTHESIA PRE PROCEDURE
Juana Coyle MD   0.1 mg at 04/29/20 2046    docusate sodium (COLACE) capsule 100 mg  100 mg Oral BID Shanice Ho MD   100 mg at 04/29/20 2147    DULoxetine (CYMBALTA) extended release capsule 120 mg  120 mg Oral QAM Shanice Ho MD   120 mg at 04/29/20 0908    pantoprazole (PROTONIX) tablet 40 mg  40 mg Oral QAM AC Shanice Ho MD   Stopped at 04/30/20 0604    ferrous sulfate (IRON 325) tablet 325 mg  325 mg Oral BID  Shanice Ho MD   325 mg at 04/29/20 1742    insulin glargine (LANTUS) injection vial 15 Units  15 Units Subcutaneous BID Shanice Ho MD   15 Units at 04/29/20 2048    lidocaine 4 % external patch 1 patch  1 patch Transdermal Daily Shanice Ho MD   Stopped at 04/30/20 1028    lisinopril (PRINIVIL;ZESTRIL) tablet 10 mg  10 mg Oral Daily Shanice Ho MD   10 mg at 04/26/20 1118    lubiprostone (AMITIZA) capsule 24 mcg  24 mcg Oral BID  Shanice Ho MD   24 mcg at 04/29/20 1742    metoprolol tartrate (LOPRESSOR) tablet 25 mg  25 mg Oral BID Shanice Ho MD   25 mg at 04/30/20 0929    miconazole (MICOTIN) 2 % powder   Topical BID Shanice Ho MD        pregabalin (LYRICA) capsule 200 mg  200 mg Oral TID Shanice Ho MD   200 mg at 04/29/20 2047    senna (SENOKOT) tablet 8.6 mg  1 tablet Oral Daily Shanice Ho MD   8.6 mg at 04/29/20 2148    spironolactone (ALDACTONE) tablet 50 mg  50 mg Oral QAM Shanice Ho MD   50 mg at 04/29/20 0908    tamsulosin (FLOMAX) capsule 0.4 mg  0.4 mg Oral Daily Shanice Ho MD   0.4 mg at 04/29/20 0908    tiZANidine (ZANAFLEX) tablet 4 mg  4 mg Oral Q8H PRN Shanice Ho MD   4 mg at 04/28/20 2248    0.9 % sodium chloride infusion   Intravenous Continuous Shanice Ho MD 75 mL/hr at 04/20/20 0405      magnesium hydroxide (MILK OF MAGNESIA) 400 MG/5ML suspension 30 mL  30 mL Oral Daily PRN Shanice Ho MD        promethazine (PHENERGAN) tablet 12.5 mg  12.5 mg Oral Q6H PRN Shanice Ho MD        Or leg        Social History:    Social History     Tobacco Use    Smoking status: Current Every Day Smoker     Packs/day: 0.50     Years: 7.00     Pack years: 3.50     Types: Cigarettes    Smokeless tobacco: Never Used    Tobacco comment: 5-7 a day    Substance Use Topics    Alcohol use: No                                Ready to quit: Not Answered  Counseling given: Not Answered  Comment: 5-7 a day       Vital Signs (Current):   Vitals:    04/30/20 0000 04/30/20 0053 04/30/20 0359 04/30/20 1001   BP: 113/63  (!) 114/55 130/61   Pulse: 87  83 80   Resp: 18 17 18 16   Temp: 97.9 °F (36.6 °C)  97.8 °F (36.6 °C) 98.3 °F (36.8 °C)   TempSrc: Temporal  Temporal    SpO2:   100%    Weight:       Height:                                                  BP Readings from Last 3 Encounters:   04/30/20 130/61   04/28/20 126/78   04/23/20 (!) 198/95       NPO Status: Time of last liquid consumption: 2330                        Time of last solid consumption: 2330                        Date of last liquid consumption: 04/27/20                        Date of last solid food consumption: 04/27/20     Pt instructed to be NPO after midnight 4/30/2020, states understanding    BMI:   Wt Readings from Last 3 Encounters:   04/16/20 (!) 335 lb (152 kg)   03/27/20 (!) 332 lb 11.2 oz (150.9 kg)   07/11/19 275 lb (124.7 kg)     Body mass index is 43.01 kg/m².     CBC:   Lab Results   Component Value Date    WBC 16.4 04/30/2020    RBC 2.47 04/30/2020    HGB 6.6 04/30/2020    HCT 20.8 04/30/2020    MCV 84.2 04/30/2020    RDW 14.9 04/30/2020     04/30/2020       CMP:   Lab Results   Component Value Date     04/30/2020    K 4.3 04/30/2020    K 5.3 04/17/2020     04/30/2020    CO2 22 04/30/2020    BUN 28 04/30/2020    CREATININE 2.2 04/30/2020    GFRAA 37 04/30/2020    LABGLOM 37 04/30/2020    GLUCOSE 150 04/30/2020    PROT 6.4 04/28/2020    CALCIUM 8.8 04/30/2020    BILITOT 0.5 04/28/2020    ALKPHOS 80 04/28/2020    AST 16 04/28/2020    ALT 22 04/28/2020       POC Tests: No results for input(s): POCGLU, POCNA, POCK, POCCL, POCBUN, POCHEMO, POCHCT in the last 72 hours. Coags:   Lab Results   Component Value Date    PROTIME 14.7 04/27/2020    INR 1.3 04/27/2020    APTT 31.6 04/16/2020       HCG (If Applicable): No results found for: PREGTESTUR, PREGSERUM, HCG, HCGQUANT     ABGs:   Lab Results   Component Value Date    TPN0VFX 22.8 03/20/2020        Type & Screen (If Applicable):  No results found for: [facility], [address]     CXR 4/21/2020:  FINDINGS:     Heart size is normal. There are no infiltrates or effusions. Tip of a   right-sided PICC line is in the SVC right atrial junction     EKG 4/16/2020:  Sinus rhythm with 1st degree AV block with premature supraventricular complexes  Nonspecific T wave abnormality  Abnormal ECG  HR 79bpm    ECHO 3/24/2020:   Summary   No source of embolus found. No intra-cardiac mass, thrombus or vegetations. Overall ejection fraction is normal.   Normal right ventricle structure and function. Mild mitral regurgitation is present. Anesthesia Evaluation  Patient summary reviewed and Nursing notes reviewed no history of anesthetic complications:   Airway: Mallampati: IV  TM distance: <3 FB   Neck ROM: full  Mouth opening: > = 3 FB Dental:      Comment: Pt has extremely poor dentition, multiple missing teeth. Pulmonary:   (+) pneumonia: resolved,  decreased breath sounds,  current smoker          Patient did not smoke on day of surgery.                 ROS comment: Currently on 3L NC, denies home O2 use   Cardiovascular:  Exercise tolerance: poor (<4 METS),   (+) hypertension:, hyperlipidemia      ECG reviewed      Echocardiogram reviewed         Beta Blocker:  Dose within 24 Hrs         Neuro/Psych:   (+) neuromuscular disease (Phantom pain):,              ROS comment: Right AKA, 2018- pt complains of phantom pain and occasional numbness/tingling GI/Hepatic/Renal:   (+) renal disease: CRI, Endo/Other:    (+) DiabetesType II DM, poorly controlled, using insulin, . Pt had no PAT visit        ROS comment: Legionnaire's Disease  Gangrene of thigh Abdominal:   (+) obese,         Vascular:   + PVD, aortic or cerebral, DVT (right leg, stump and femoral per pt, 1 month ago), . Anesthesia Plan      MAC and general     ASA 3     (ROSE PICC double lumen)  Induction: intravenous. Anesthetic plan and risks discussed with patient (All questions addressed and answered). Use of blood products discussed with patient whom consented to blood products. Plan discussed with attending and CRNA. DOS STAFF ADDENDUM:    Patient seen and chart reviewed. Physical exam and history updated as indicated. NPO status confirmed. Anesthesia options and plan discussed including risks benefits with patient/legal guardian and family as available. Concerns and questions addressed. Consent verbalized to proceed.   Anesthesia plan, options and intraoperative/postoperative concerns discussed with care team.    Joy Todd MD  4/30/2020  10:53 AM

## 2020-04-29 NOTE — PLAN OF CARE
DR Valentin Allen NOTIFIED THAT PTS PICC LINE WAS FOUND OUT. PT HAS NO IDEA HOW LINE WAS DISLODGED. WAITING FOR FURTHER ORDERS FROM DR Valentin Allen.

## 2020-04-29 NOTE — PROGRESS NOTES
[address] [place] [address] [facility]  [facility]  Progress Note      Chief Complaint   Patient presents with    Wound Infection     right above knee amputation, drainage x 1 week       SUBJECTIVE:    She is seen and examined at bedside. Patient is resting in bed. Patient reports pain to right extremity stump. He is getting pain medication through PCA pump. He does report nausea, vomiting-he is getting Zofran. Patient is tolerating medications. No reported adverse drug reactions. Patient PICC line came out accidentally. Discussed with RN patient-- will need another PICC line placed. Right lower extremity chronic stump wound, groin wound infection sp 3/20 R iliofemoral embolectomy, deep femoral embolectomy, 4/17 right lower extremity extensive debridement down to the muscle of right upper thigh, 04/20 right thigh necrosis excisional debridement of muscle, subcutaneous tissue and skin,  04/21, 04/23,0428 -- excisional debridement of infected necrotic tissue, right groin and right thigh, including skin, subcutaneous tissue, fascia, and muscles.       Review of systems:  As stated above in the chief complaint, otherwise negative.     Medications:  Scheduled Meds:   insulin lispro  0-12 Units Subcutaneous TID WC    insulin lispro  0-6 Units Subcutaneous Nightly    sodium hypochlorite   Irrigation Once    lactulose  20 g Oral BID    bisacodyl  10 mg Oral BID    polyethylene glycol  17 g Oral Daily    sodium hypochlorite   Irrigation Daily    cefepime  2 g Intravenous Q8H    sodium chloride flush  10 mL Intravenous 2 times per day    sodium chloride  25 mL Intravenous Q12H    amLODIPine  5 mg Oral Daily    aspirin  81 mg Oral Daily    atorvastatin  10 mg Oral Daily    cilostazol  100 mg Oral BID    cloNIDine  0.1 mg Oral BID    docusate sodium  100 mg Oral BID    DULoxetine  120 mg Oral QAM    pantoprazole  40 mg Oral QAM AC    ferrous sulfate  325 mg Oral BID WC    insulin glargine  15

## 2020-04-29 NOTE — CARE COORDINATION
The pt has criteria for LTAC. Asked him if he is willing to go and he replied that he has not thought about it yet.  Told him will follow-up later today

## 2020-04-30 ENCOUNTER — ANESTHESIA (OUTPATIENT)
Dept: OPERATING ROOM | Age: 65
DRG: 463 | End: 2020-04-30
Payer: COMMERCIAL

## 2020-04-30 VITALS — DIASTOLIC BLOOD PRESSURE: 86 MMHG | SYSTOLIC BLOOD PRESSURE: 147 MMHG | OXYGEN SATURATION: 100 %

## 2020-04-30 LAB
ANION GAP SERPL CALCULATED.3IONS-SCNC: 11 MMOL/L (ref 7–16)
BLOOD BANK DISPENSE STATUS: NORMAL
BLOOD BANK PRODUCT CODE: NORMAL
BPU ID: NORMAL
BUN BLDV-MCNC: 28 MG/DL (ref 8–23)
CALCIUM SERPL-MCNC: 8.8 MG/DL (ref 8.6–10.2)
CHLORIDE BLD-SCNC: 103 MMOL/L (ref 98–107)
CO2: 22 MMOL/L (ref 22–29)
CREAT SERPL-MCNC: 2.2 MG/DL (ref 0.7–1.2)
DESCRIPTION BLOOD BANK: NORMAL
GFR AFRICAN AMERICAN: 37
GFR NON-AFRICAN AMERICAN: 37 ML/MIN/1.73
GLUCOSE BLD-MCNC: 150 MG/DL (ref 74–99)
HCT VFR BLD CALC: 20.8 % (ref 37–54)
HCT VFR BLD CALC: 24.5 % (ref 37–54)
HEMOGLOBIN: 6.6 G/DL (ref 12.5–16.5)
HEMOGLOBIN: 8 G/DL (ref 12.5–16.5)
MCH RBC QN AUTO: 26.7 PG (ref 26–35)
MCHC RBC AUTO-ENTMCNC: 31.7 % (ref 32–34.5)
MCV RBC AUTO: 84.2 FL (ref 80–99.9)
METER GLUCOSE: 179 MG/DL (ref 74–99)
METER GLUCOSE: 226 MG/DL (ref 74–99)
METER GLUCOSE: 228 MG/DL (ref 74–99)
PDW BLD-RTO: 14.9 FL (ref 11.5–15)
PLATELET # BLD: 210 E9/L (ref 130–450)
PMV BLD AUTO: 11.5 FL (ref 7–12)
POTASSIUM SERPL-SCNC: 4.3 MMOL/L (ref 3.5–5)
PROCALCITONIN: 0.76 NG/ML (ref 0–0.08)
RBC # BLD: 2.47 E12/L (ref 3.8–5.8)
SODIUM BLD-SCNC: 136 MMOL/L (ref 132–146)
WBC # BLD: 16.4 E9/L (ref 4.5–11.5)

## 2020-04-30 PROCEDURE — 6360000002 HC RX W HCPCS: Performed by: STUDENT IN AN ORGANIZED HEALTH CARE EDUCATION/TRAINING PROGRAM

## 2020-04-30 PROCEDURE — 3600000003 HC SURGERY LEVEL 3 BASE: Performed by: SURGERY

## 2020-04-30 PROCEDURE — 88304 TISSUE EXAM BY PATHOLOGIST: CPT

## 2020-04-30 PROCEDURE — 6370000000 HC RX 637 (ALT 250 FOR IP): Performed by: STUDENT IN AN ORGANIZED HEALTH CARE EDUCATION/TRAINING PROGRAM

## 2020-04-30 PROCEDURE — 36415 COLL VENOUS BLD VENIPUNCTURE: CPT

## 2020-04-30 PROCEDURE — 85018 HEMOGLOBIN: CPT

## 2020-04-30 PROCEDURE — 7100000001 HC PACU RECOVERY - ADDTL 15 MIN: Performed by: SURGERY

## 2020-04-30 PROCEDURE — 36430 TRANSFUSION BLD/BLD COMPNT: CPT

## 2020-04-30 PROCEDURE — 7100000000 HC PACU RECOVERY - FIRST 15 MIN: Performed by: SURGERY

## 2020-04-30 PROCEDURE — 2580000003 HC RX 258: Performed by: STUDENT IN AN ORGANIZED HEALTH CARE EDUCATION/TRAINING PROGRAM

## 2020-04-30 PROCEDURE — 04PY0JZ REMOVAL OF SYNTHETIC SUBSTITUTE FROM LOWER ARTERY, OPEN APPROACH: ICD-10-PCS | Performed by: SURGERY

## 2020-04-30 PROCEDURE — 1200000000 HC SEMI PRIVATE

## 2020-04-30 PROCEDURE — 6360000002 HC RX W HCPCS: Performed by: ANESTHESIOLOGY

## 2020-04-30 PROCEDURE — 2500000003 HC RX 250 WO HCPCS: Performed by: NURSE ANESTHETIST, CERTIFIED REGISTERED

## 2020-04-30 PROCEDURE — 80048 BASIC METABOLIC PNL TOTAL CA: CPT

## 2020-04-30 PROCEDURE — 85027 COMPLETE CBC AUTOMATED: CPT

## 2020-04-30 PROCEDURE — 15738 MUSCLE-SKIN GRAFT LEG: CPT | Performed by: PLASTIC SURGERY

## 2020-04-30 PROCEDURE — 94660 CPAP INITIATION&MGMT: CPT

## 2020-04-30 PROCEDURE — 3700000000 HC ANESTHESIA ATTENDED CARE: Performed by: SURGERY

## 2020-04-30 PROCEDURE — 82962 GLUCOSE BLOOD TEST: CPT

## 2020-04-30 PROCEDURE — 2700000000 HC OXYGEN THERAPY PER DAY

## 2020-04-30 PROCEDURE — 85014 HEMATOCRIT: CPT

## 2020-04-30 PROCEDURE — 35226 REPAIR BLOOD VESSEL DIR LXTR: CPT | Performed by: SURGERY

## 2020-04-30 PROCEDURE — 0Y6C0Z3 DETACHMENT AT RIGHT UPPER LEG, LOW, OPEN APPROACH: ICD-10-PCS | Performed by: SURGERY

## 2020-04-30 PROCEDURE — 84145 PROCALCITONIN (PCT): CPT

## 2020-04-30 PROCEDURE — 3600000013 HC SURGERY LEVEL 3 ADDTL 15MIN: Performed by: SURGERY

## 2020-04-30 PROCEDURE — 3700000001 HC ADD 15 MINUTES (ANESTHESIA): Performed by: SURGERY

## 2020-04-30 PROCEDURE — 2709999900 HC NON-CHARGEABLE SUPPLY: Performed by: SURGERY

## 2020-04-30 PROCEDURE — 6360000002 HC RX W HCPCS: Performed by: NURSE ANESTHETIST, CERTIFIED REGISTERED

## 2020-04-30 RX ORDER — 0.9 % SODIUM CHLORIDE 0.9 %
20 INTRAVENOUS SOLUTION INTRAVENOUS ONCE
Status: DISCONTINUED | OUTPATIENT
Start: 2020-04-30 | End: 2020-05-04 | Stop reason: HOSPADM

## 2020-04-30 RX ORDER — ONDANSETRON 2 MG/ML
INJECTION INTRAMUSCULAR; INTRAVENOUS PRN
Status: DISCONTINUED | OUTPATIENT
Start: 2020-04-30 | End: 2020-04-30 | Stop reason: SDUPTHER

## 2020-04-30 RX ORDER — PROPOFOL 10 MG/ML
INJECTION, EMULSION INTRAVENOUS PRN
Status: DISCONTINUED | OUTPATIENT
Start: 2020-04-30 | End: 2020-04-30 | Stop reason: SDUPTHER

## 2020-04-30 RX ORDER — MIDAZOLAM HYDROCHLORIDE 1 MG/ML
INJECTION INTRAMUSCULAR; INTRAVENOUS PRN
Status: DISCONTINUED | OUTPATIENT
Start: 2020-04-30 | End: 2020-04-30 | Stop reason: SDUPTHER

## 2020-04-30 RX ORDER — DIAPER,BRIEF,INFANT-TODD,DISP
EACH MISCELLANEOUS 2 TIMES DAILY
Status: DISCONTINUED | OUTPATIENT
Start: 2020-04-30 | End: 2020-05-04 | Stop reason: HOSPADM

## 2020-04-30 RX ORDER — HYDRALAZINE HYDROCHLORIDE 20 MG/ML
5 INJECTION INTRAMUSCULAR; INTRAVENOUS EVERY 10 MIN PRN
Status: DISCONTINUED | OUTPATIENT
Start: 2020-04-30 | End: 2020-04-30 | Stop reason: HOSPADM

## 2020-04-30 RX ORDER — LIDOCAINE HYDROCHLORIDE 20 MG/ML
INJECTION, SOLUTION INTRAVENOUS PRN
Status: DISCONTINUED | OUTPATIENT
Start: 2020-04-30 | End: 2020-04-30 | Stop reason: SDUPTHER

## 2020-04-30 RX ORDER — PROMETHAZINE HYDROCHLORIDE 25 MG/ML
6.25 INJECTION, SOLUTION INTRAMUSCULAR; INTRAVENOUS
Status: DISCONTINUED | OUTPATIENT
Start: 2020-04-30 | End: 2020-04-30 | Stop reason: HOSPADM

## 2020-04-30 RX ORDER — SUCCINYLCHOLINE/SOD CL,ISO/PF 200MG/10ML
SYRINGE (ML) INTRAVENOUS PRN
Status: DISCONTINUED | OUTPATIENT
Start: 2020-04-30 | End: 2020-04-30 | Stop reason: SDUPTHER

## 2020-04-30 RX ORDER — FENTANYL CITRATE 50 UG/ML
INJECTION, SOLUTION INTRAMUSCULAR; INTRAVENOUS PRN
Status: DISCONTINUED | OUTPATIENT
Start: 2020-04-30 | End: 2020-04-30 | Stop reason: SDUPTHER

## 2020-04-30 RX ORDER — MEPERIDINE HYDROCHLORIDE 25 MG/ML
12.5 INJECTION INTRAMUSCULAR; INTRAVENOUS; SUBCUTANEOUS EVERY 5 MIN PRN
Status: DISCONTINUED | OUTPATIENT
Start: 2020-04-30 | End: 2020-04-30 | Stop reason: HOSPADM

## 2020-04-30 RX ORDER — LABETALOL HYDROCHLORIDE 5 MG/ML
5 INJECTION, SOLUTION INTRAVENOUS EVERY 10 MIN PRN
Status: DISCONTINUED | OUTPATIENT
Start: 2020-04-30 | End: 2020-04-30 | Stop reason: HOSPADM

## 2020-04-30 RX ORDER — DEXAMETHASONE SODIUM PHOSPHATE 10 MG/ML
INJECTION INTRAMUSCULAR; INTRAVENOUS PRN
Status: DISCONTINUED | OUTPATIENT
Start: 2020-04-30 | End: 2020-04-30 | Stop reason: SDUPTHER

## 2020-04-30 RX ADMIN — INSULIN LISPRO 4 UNITS: 100 INJECTION, SOLUTION INTRAVENOUS; SUBCUTANEOUS at 17:27

## 2020-04-30 RX ADMIN — PHENYLEPHRINE HYDROCHLORIDE 100 MCG: 10 INJECTION INTRAVENOUS at 12:48

## 2020-04-30 RX ADMIN — DEXAMETHASONE SODIUM PHOSPHATE 10 MG: 10 INJECTION INTRAMUSCULAR; INTRAVENOUS at 11:45

## 2020-04-30 RX ADMIN — HYDROMORPHONE HYDROCHLORIDE 1 MG: 1 INJECTION, SOLUTION INTRAMUSCULAR; INTRAVENOUS; SUBCUTANEOUS at 14:24

## 2020-04-30 RX ADMIN — PHENYLEPHRINE HYDROCHLORIDE 100 MCG: 10 INJECTION INTRAVENOUS at 12:44

## 2020-04-30 RX ADMIN — BACITRACIN ZINC: 500 OINTMENT TOPICAL at 21:49

## 2020-04-30 RX ADMIN — SODIUM CHLORIDE: 9 INJECTION, SOLUTION INTRAVENOUS at 13:54

## 2020-04-30 RX ADMIN — MIDAZOLAM 2 MG: 1 INJECTION INTRAMUSCULAR; INTRAVENOUS at 11:23

## 2020-04-30 RX ADMIN — SODIUM CHLORIDE, PRESERVATIVE FREE 300 UNITS: 5 INJECTION INTRAVENOUS at 21:49

## 2020-04-30 RX ADMIN — CILOSTAZOL 100 MG: 100 TABLET ORAL at 20:49

## 2020-04-30 RX ADMIN — CEFEPIME 2 G: 2 INJECTION, POWDER, FOR SOLUTION INTRAVENOUS at 13:28

## 2020-04-30 RX ADMIN — METOPROLOL TARTRATE 25 MG: 25 TABLET, FILM COATED ORAL at 20:50

## 2020-04-30 RX ADMIN — INSULIN GLARGINE 15 UNITS: 100 INJECTION, SOLUTION SUBCUTANEOUS at 21:50

## 2020-04-30 RX ADMIN — PHENYLEPHRINE HYDROCHLORIDE 100 MCG: 10 INJECTION INTRAVENOUS at 12:08

## 2020-04-30 RX ADMIN — PREGABALIN 200 MG: 100 CAPSULE ORAL at 20:50

## 2020-04-30 RX ADMIN — Medication 180 MG: at 11:33

## 2020-04-30 RX ADMIN — FENTANYL CITRATE 150 MCG: 50 INJECTION, SOLUTION INTRAMUSCULAR; INTRAVENOUS at 11:33

## 2020-04-30 RX ADMIN — FENTANYL CITRATE 50 MCG: 50 INJECTION, SOLUTION INTRAMUSCULAR; INTRAVENOUS at 12:27

## 2020-04-30 RX ADMIN — MICONAZOLE NITRATE: 20.6 POWDER TOPICAL at 21:52

## 2020-04-30 RX ADMIN — PHENYLEPHRINE HYDROCHLORIDE 100 MCG: 10 INJECTION INTRAVENOUS at 12:39

## 2020-04-30 RX ADMIN — INSULIN LISPRO 2 UNITS: 100 INJECTION, SOLUTION INTRAVENOUS; SUBCUTANEOUS at 20:54

## 2020-04-30 RX ADMIN — CLONIDINE HYDROCHLORIDE 0.1 MG: 0.1 TABLET ORAL at 20:49

## 2020-04-30 RX ADMIN — HYDROMORPHONE HYDROCHLORIDE 1 MG: 1 INJECTION, SOLUTION INTRAMUSCULAR; INTRAVENOUS; SUBCUTANEOUS at 14:38

## 2020-04-30 RX ADMIN — BISACODYL 10 MG: 5 TABLET, COATED ORAL at 20:50

## 2020-04-30 RX ADMIN — CEFEPIME 2 G: 2 INJECTION, POWDER, FOR SOLUTION INTRAVENOUS at 04:08

## 2020-04-30 RX ADMIN — LUBIPROSTONE 24 MCG: 24 CAPSULE, GELATIN COATED ORAL at 21:52

## 2020-04-30 RX ADMIN — FENTANYL CITRATE 50 MCG: 50 INJECTION, SOLUTION INTRAMUSCULAR; INTRAVENOUS at 12:57

## 2020-04-30 RX ADMIN — CEFEPIME 2 G: 2 INJECTION, POWDER, FOR SOLUTION INTRAVENOUS at 20:50

## 2020-04-30 RX ADMIN — PROPOFOL 200 MG: 10 INJECTION, EMULSION INTRAVENOUS at 11:33

## 2020-04-30 RX ADMIN — ATORVASTATIN CALCIUM 10 MG: 10 TABLET, FILM COATED ORAL at 21:48

## 2020-04-30 RX ADMIN — METOPROLOL TARTRATE 25 MG: 25 TABLET, FILM COATED ORAL at 09:29

## 2020-04-30 RX ADMIN — Medication 10 ML: at 21:52

## 2020-04-30 RX ADMIN — ONDANSETRON HYDROCHLORIDE 4 MG: 2 INJECTION, SOLUTION INTRAMUSCULAR; INTRAVENOUS at 13:35

## 2020-04-30 RX ADMIN — LIDOCAINE HYDROCHLORIDE 100 MG: 20 INJECTION, SOLUTION INTRAVENOUS at 11:33

## 2020-04-30 RX ADMIN — FERROUS SULFATE TAB 325 MG (65 MG ELEMENTAL FE) 325 MG: 325 (65 FE) TAB at 18:16

## 2020-04-30 RX ADMIN — Medication: at 07:36

## 2020-04-30 ASSESSMENT — PULMONARY FUNCTION TESTS
PIF_VALUE: 19
PIF_VALUE: 12
PIF_VALUE: 16
PIF_VALUE: 21
PIF_VALUE: 19
PIF_VALUE: 20
PIF_VALUE: 13
PIF_VALUE: 12
PIF_VALUE: 13
PIF_VALUE: 0
PIF_VALUE: 19
PIF_VALUE: 11
PIF_VALUE: 13
PIF_VALUE: 11
PIF_VALUE: 20
PIF_VALUE: 11
PIF_VALUE: 11
PIF_VALUE: 12
PIF_VALUE: 13
PIF_VALUE: 10
PIF_VALUE: 13
PIF_VALUE: 12
PIF_VALUE: 22
PIF_VALUE: 12
PIF_VALUE: 22
PIF_VALUE: 13
PIF_VALUE: 13
PIF_VALUE: 19
PIF_VALUE: 21
PIF_VALUE: 12
PIF_VALUE: 20
PIF_VALUE: 11
PIF_VALUE: 13
PIF_VALUE: 19
PIF_VALUE: 21
PIF_VALUE: 2
PIF_VALUE: 0
PIF_VALUE: 0
PIF_VALUE: 13
PIF_VALUE: 13
PIF_VALUE: 2
PIF_VALUE: 20
PIF_VALUE: 21
PIF_VALUE: 13
PIF_VALUE: 4
PIF_VALUE: 12
PIF_VALUE: 19
PIF_VALUE: 13
PIF_VALUE: 19
PIF_VALUE: 12
PIF_VALUE: 11
PIF_VALUE: 21
PIF_VALUE: 17
PIF_VALUE: 1
PIF_VALUE: 0
PIF_VALUE: 21
PIF_VALUE: 20
PIF_VALUE: 2
PIF_VALUE: 0
PIF_VALUE: 12
PIF_VALUE: 13
PIF_VALUE: 12
PIF_VALUE: 14
PIF_VALUE: 13
PIF_VALUE: 0
PIF_VALUE: 13
PIF_VALUE: 19
PIF_VALUE: 12
PIF_VALUE: 19
PIF_VALUE: 13
PIF_VALUE: 13
PIF_VALUE: 12
PIF_VALUE: 11
PIF_VALUE: 12
PIF_VALUE: 19
PIF_VALUE: 0
PIF_VALUE: 18
PIF_VALUE: 21
PIF_VALUE: 19
PIF_VALUE: 12
PIF_VALUE: 2
PIF_VALUE: 12
PIF_VALUE: 22
PIF_VALUE: 12
PIF_VALUE: 12
PIF_VALUE: 19
PIF_VALUE: 13
PIF_VALUE: 12
PIF_VALUE: 20
PIF_VALUE: 13
PIF_VALUE: 20
PIF_VALUE: 14
PIF_VALUE: 12
PIF_VALUE: 20
PIF_VALUE: 12
PIF_VALUE: 13
PIF_VALUE: 0
PIF_VALUE: 12
PIF_VALUE: 13
PIF_VALUE: 12
PIF_VALUE: 12
PIF_VALUE: 13
PIF_VALUE: 12
PIF_VALUE: 19
PIF_VALUE: 13
PIF_VALUE: 12
PIF_VALUE: 12
PIF_VALUE: 21
PIF_VALUE: 13
PIF_VALUE: 12
PIF_VALUE: 12
PIF_VALUE: 13
PIF_VALUE: 12
PIF_VALUE: 12
PIF_VALUE: 13
PIF_VALUE: 12
PIF_VALUE: 0
PIF_VALUE: 13
PIF_VALUE: 13
PIF_VALUE: 11
PIF_VALUE: 20
PIF_VALUE: 11
PIF_VALUE: 19
PIF_VALUE: 20
PIF_VALUE: 13
PIF_VALUE: 13
PIF_VALUE: 11
PIF_VALUE: 20
PIF_VALUE: 13
PIF_VALUE: 13
PIF_VALUE: 14
PIF_VALUE: 13
PIF_VALUE: 14
PIF_VALUE: 1
PIF_VALUE: 18
PIF_VALUE: 18
PIF_VALUE: 12
PIF_VALUE: 19
PIF_VALUE: 12
PIF_VALUE: 0
PIF_VALUE: 12
PIF_VALUE: 19
PIF_VALUE: 13
PIF_VALUE: 12
PIF_VALUE: 12
PIF_VALUE: 21
PIF_VALUE: 13
PIF_VALUE: 10
PIF_VALUE: 21
PIF_VALUE: 20
PIF_VALUE: 13
PIF_VALUE: 12
PIF_VALUE: 24
PIF_VALUE: 12
PIF_VALUE: 18
PIF_VALUE: 19

## 2020-04-30 ASSESSMENT — PAIN SCALES - GENERAL
PAINLEVEL_OUTOF10: 9
PAINLEVEL_OUTOF10: 10
PAINLEVEL_OUTOF10: 0
PAINLEVEL_OUTOF10: 10
PAINLEVEL_OUTOF10: 0
PAINLEVEL_OUTOF10: 10

## 2020-04-30 ASSESSMENT — PAIN DESCRIPTION - LOCATION
LOCATION: LEG

## 2020-04-30 ASSESSMENT — PAIN DESCRIPTION - FREQUENCY
FREQUENCY: CONTINUOUS

## 2020-04-30 ASSESSMENT — PAIN DESCRIPTION - ORIENTATION
ORIENTATION: RIGHT

## 2020-04-30 ASSESSMENT — PAIN DESCRIPTION - PROGRESSION
CLINICAL_PROGRESSION: NOT CHANGED
CLINICAL_PROGRESSION: GRADUALLY WORSENING
CLINICAL_PROGRESSION: GRADUALLY WORSENING

## 2020-04-30 ASSESSMENT — PAIN DESCRIPTION - DESCRIPTORS
DESCRIPTORS: ACHING;DISCOMFORT

## 2020-04-30 ASSESSMENT — PAIN DESCRIPTION - PAIN TYPE
TYPE: SURGICAL PAIN

## 2020-04-30 NOTE — ANESTHESIA POSTPROCEDURE EVALUATION
Department of Anesthesiology  Postprocedure Note    Patient: Reny Flores  MRN: [id number]  YOB: 1955  Date of evaluation: 4/30/2020  Time:  2:23 PM     Procedure Summary     Date:  04/30/20 Room / Location:  [facility] 04 / [facility]    Anesthesia Start:  1468 Anesthesia Stop:  7992    Procedure:  DEBRIDEMENT OF AMPUTATION RIGHT ABOVE KNEE,  POSS. ABOVE KNEE REVISION, POSS. CLOSURE, POSS.WOUND VAC -- BEN [address] / [address] (Right ) Diagnosis:  (.)    Surgeon:  Lester Patel MD Responsible Provider:  Hao Gonzáles MD    Anesthesia Type:  MAC, general ASA Status:  3          Anesthesia Type: MAC, general    Blaine Phase I: Blaine Score: 9    Blaine Phase II:      Last vitals: Reviewed and per EMR flowsheets.        Anesthesia Post Evaluation    Patient location during evaluation: PACU  Patient participation: complete - patient participated  Level of consciousness: awake and alert  Airway patency: patent  Nausea & Vomiting: no nausea and no vomiting  Complications: no  Cardiovascular status: hemodynamically stable and blood pressure returned to baseline  Respiratory status: acceptable  Hydration status: euvolemic

## 2020-04-30 NOTE — OP NOTE
Mayito Zayas  1955      Patient: Mayito Zayas  YOB: 1955  MRN: [id number]     Date of Procedure: 4/30/2020     Pre-Op Diagnosis: Right Thigh wound of above knee amputation     Post-Op Diagnosis: Same       Procedure(s):  Irrigation and excisional debridement of the Right thigh above knee amputation wound 25 x45 cm  Excision of prosthetic R LE bypass graft  Ligation of right SFA     Surgeon(s):  Omar Ramírez MD     Assistant:  Surgical Assistant: Aruna Mera  Resident: Abhinav Osman MD     Anesthesia: [facility] Anest[facility]     Estimated Blood Loss (mL): 232      Complications: None     Specimens:   ID Type Source Tests Collected by Time Destination   A : RIGHT ABOVE KNEE AMPUTATION WOUND DEBRIDEMENT Tissue Tissue SURGICAL PATHOLOGY Omar Ramírez MD 4/30/2020 1340           Implants:  * No implants in log *      Drains:        Closed/Suction Drain Right Thigh Bulb 19 Taiwanese (Active)       Closed/Suction Drain Right Thigh Bulb 19 Taiwanese (Active)       [REMOVED] Urethral Catheter Non-latex;Straight-tip 16 fr (Removed)   Catheter Indications Need for fluid management in critically ill patients in a critical care setting not able to be managed by other means such as BSC with hat, bedpan, urinal, condom catheter, or short term intermittent urethral catherization 3/22/2020  6:00 PM   Site Assessment IAN 3/21/2020  6:00 PM   Urine Color Yellow 3/22/2020  6:00 PM   Urine Appearance Sediment 3/22/2020  6:00 PM   Output (mL) 125 mL 3/22/2020  6:00 PM         Findings: necrotic, but not frankly infected medial muscle and fat, completely debrided, thrombosed SFA, exposed patent profunda artery      DESCRIPTION OF PROCEDURE: The patient was identified and the procedure was confirmed. The wound and surrounding area was prepped and draped in sterile fashion.     With the patient in supine position, the entire skin and circumference was sharply excised to get to a healthy skin
Operative Note      Patient: Fan Baltazar  YOB: 1955  MRN: [id number]    Date of Procedure: 4/17/2020    Pre-Op Diagnosis: infected right thigh    Post-Op Diagnosis: Same       Procedure(s):  RIGHT LEG DEBRIDEMENT INCISION AND DRAINAGE 10 x 2 cm groin excision, 32 x 20 cm Right thigh incision, 20 cm2 x 32 excisional debridement    Surgeon(s):  Vikram Wooten MD    Assistant:   Resident: Alejandro Whitman MD    Anesthesia: General    Estimated Blood Loss (mL): less than 50     Complications: None    Specimens:   ID Type Source Tests Collected by Time Destination   1 : SWABS RIGHT THIGH WOUND Tissue Tissue CULTURE, ANAEROBIC, CULTURE, SURGICAL Vikram Wooten MD 4/17/2020 6672    2 : TISSUE RIGHT THIGH Tissue Tissue CULTURE, ANAEROBIC, GRAM STAIN, CULTURE, [facility], MD 4/17/2020 0911    A : TISSUE RIGHT THIGH Tissue Tissue SURGICAL PATHOLOGY Vikram Wooten MD 4/17/2020 9859        Implants:  * No implants in log *      Drains:   [REMOVED] Urethral Catheter Non-latex;Straight-tip 16 fr (Removed)   Catheter Indications Need for fluid management in critically ill patients in a critical care setting not able to be managed by other means such as BSC with hat, bedpan, urinal, condom catheter, or short term intermittent urethral catherization 3/22/2020  6:00 PM   Site Assessment IAN 3/21/2020  6:00 PM   Urine Color Yellow 3/22/2020  6:00 PM   Urine Appearance Sediment 3/22/2020  6:00 PM   Output (mL) 125 mL 3/22/2020  6:00 PM       Findings: necrotic underlying muscle, no apparent pseudoaneurysm or connection of soft tissue necrosis with larger wound    Detailed Description of Procedure:     Patient was placed in the supine position prepped and draped in the usual fashion. The right groin sutures were removed and the area bluntly explored down to the base of the cavity. The artery was not directly visualized nor a pulse palpable.   However in order to prevent direct exposure of the
Operative Note      Patient: Reny Flores  YOB: 1955  MRN: [id number]    Date of Procedure: 4/23/2020    Pre-Op Diagnosis: RIGHT THIGH NECROSIS    Post-Op Diagnosis: Same       Procedure(s):  RIGHT THIGH WOUND DRESSING CHANGE and DEBRIDEMENT  Debridement of subcutaneous tissue, muscle, and fascia  18 x 45 cm = 810 cm 2 was wound size    Surgeon(s):  Lester Patel MD    Assistant:   Surgical Assistant: Lin Olivier  Resident: Tracy Cash DO    Anesthesia: General    Estimated Blood Loss (mL): 636     Complications: None    Specimens:   * No specimens in log *    Implants:  * No implants in log *      Drains:   [REMOVED] Urethral Catheter Non-latex;Straight-tip 16 fr (Removed)   Catheter Indications Need for fluid management in critically ill patients in a critical care setting not able to be managed by other means such as BSC with hat, bedpan, urinal, condom catheter, or short term intermittent urethral catherization 3/22/2020  6:00 PM   Site Assessment IAN 3/21/2020  6:00 PM   Urine Color Yellow 3/22/2020  6:00 PM   Urine Appearance Sediment 3/22/2020  6:00 PM   Output (mL) 125 mL 3/22/2020  6:00 PM       Findings: Some muscle was not viable but overall improved    Detailed Description of Procedure: With the patient in the supine position, after satisfactory induction of general endotracheal anesthesia, the entire right groin and right thigh were prepped and draped in the usual fashion.     The patient wound was much improved as compared to last debridement. He did have significant areas of non viable muscle and subcutaneous tissue still though. Excisional debridement was performed of the subcutaneous tissue, fascia, and the muscle until healthy tissues were noted. Bleeding was controlled with stick ties and silk ties along with cautery and pressure. Muscle was excised off the femur and laterally. ~ 20% of the wounds was debrided. Hemostasis was secured.   The
area in satisfactory condition.      Dr. Pineda Flores was present for the entire procedure    Omar Ramírez
correct x2. The patient tolerated the procedure and was transferred to the recovery area in satisfactory condition.      Dr. Saundra Herrera was present throughout    Electronically signed by Sal Keller DO on 4/20/2020 at 10:20 AM
the groin. This was done with 2-0 Vicryl horizontal mattress sutures. Two 23 Swazi drains were placed in the wound, the right lower drain draining around the distal aspect of the femur, and the superior drain draining near the upper incision line. 2-0 and 3-0 Vicryl sutures were used to approximate the deep dermal layer of the posterior flap to the anterior aspect of the wound, folding the myocutaneous flap over the wound. Staples were used to reinforce the wound. A complex closure was performed 65 cm long. Sterile dressings of heavy drainage packs taped in place with paper tape were placed in the OR.     Dr. Esha Parsons was present for the entire procedure    Electronically signed by Van Narayanan MD on 4/30/20 at 2:29 PM EDT          Electronically signed by Van Narayanan MD on 4/30/2020 at 2:20 PM
thoroughly irrigated  with a pulse  and dressed with Kerlix soaked in Dakin's  solution followed by ABD pads and sponge pads and wrapped with Kerlix  and 6-inch Ace bandages.         Buster Romo MD    D: 04/21/2020 14:58:22       T: 04/21/2020 15:26:12     [email or web address]  Job#: [id number]     Doc#: [id number]    CC:

## 2020-04-30 NOTE — PROGRESS NOTES
Date: 4/29/2020    Time: 10:24 PM    Patient Placed On BIPAP/CPAP/ Non-Invasive Ventilation? Yes    If no must comment. Facial area red/color change? No           If YES are Blister/Lesion present? No   If yes must notify nursing staff  BIPAP/CPAP skin barrier?   Yes    Skin barrier type:duoderm       Comments:        Braulio Jang

## 2020-05-01 ENCOUNTER — APPOINTMENT (OUTPATIENT)
Dept: ULTRASOUND IMAGING | Age: 65
DRG: 463 | End: 2020-05-01
Payer: COMMERCIAL

## 2020-05-01 LAB
ABO/RH: NORMAL
ANION GAP SERPL CALCULATED.3IONS-SCNC: 10 MMOL/L (ref 7–16)
ANION GAP SERPL CALCULATED.3IONS-SCNC: 8 MMOL/L (ref 7–16)
ANTIBODY SCREEN: NORMAL
BASOPHILS ABSOLUTE: 0.03 E9/L (ref 0–0.2)
BASOPHILS RELATIVE PERCENT: 0.2 % (ref 0–2)
BUN BLDV-MCNC: 36 MG/DL (ref 8–23)
BUN BLDV-MCNC: 39 MG/DL (ref 8–23)
CALCIUM SERPL-MCNC: 8.4 MG/DL (ref 8.6–10.2)
CALCIUM SERPL-MCNC: 8.5 MG/DL (ref 8.6–10.2)
CHLORIDE BLD-SCNC: 103 MMOL/L (ref 98–107)
CHLORIDE BLD-SCNC: 103 MMOL/L (ref 98–107)
CO2: 21 MMOL/L (ref 22–29)
CO2: 22 MMOL/L (ref 22–29)
CREAT SERPL-MCNC: 2.8 MG/DL (ref 0.7–1.2)
CREAT SERPL-MCNC: 2.9 MG/DL (ref 0.7–1.2)
EOSINOPHILS ABSOLUTE: 0.04 E9/L (ref 0.05–0.5)
EOSINOPHILS RELATIVE PERCENT: 0.2 % (ref 0–6)
GFR AFRICAN AMERICAN: 27
GFR AFRICAN AMERICAN: 28
GFR NON-AFRICAN AMERICAN: 27 ML/MIN/1.73
GFR NON-AFRICAN AMERICAN: 28 ML/MIN/1.73
GLUCOSE BLD-MCNC: 131 MG/DL (ref 74–99)
GLUCOSE BLD-MCNC: 212 MG/DL (ref 74–99)
HCT VFR BLD CALC: 21.1 % (ref 37–54)
HCT VFR BLD CALC: 21.9 % (ref 37–54)
HEMOGLOBIN: 6.8 G/DL (ref 12.5–16.5)
HEMOGLOBIN: 7.2 G/DL (ref 12.5–16.5)
IMMATURE GRANULOCYTES #: 0.16 E9/L
IMMATURE GRANULOCYTES %: 0.9 % (ref 0–5)
LYMPHOCYTES ABSOLUTE: 2.1 E9/L (ref 1.5–4)
LYMPHOCYTES RELATIVE PERCENT: 11.2 % (ref 20–42)
MCH RBC QN AUTO: 27.6 PG (ref 26–35)
MCH RBC QN AUTO: 27.8 PG (ref 26–35)
MCHC RBC AUTO-ENTMCNC: 32.2 % (ref 32–34.5)
MCHC RBC AUTO-ENTMCNC: 32.9 % (ref 32–34.5)
MCV RBC AUTO: 84.6 FL (ref 80–99.9)
MCV RBC AUTO: 85.8 FL (ref 80–99.9)
METER GLUCOSE: 150 MG/DL (ref 74–99)
METER GLUCOSE: 190 MG/DL (ref 74–99)
METER GLUCOSE: 221 MG/DL (ref 74–99)
MONOCYTES ABSOLUTE: 1.39 E9/L (ref 0.1–0.95)
MONOCYTES RELATIVE PERCENT: 7.4 % (ref 2–12)
NEUTROPHILS ABSOLUTE: 15.1 E9/L (ref 1.8–7.3)
NEUTROPHILS RELATIVE PERCENT: 80.1 % (ref 43–80)
PDW BLD-RTO: 14.6 FL (ref 11.5–15)
PDW BLD-RTO: 14.6 FL (ref 11.5–15)
PLATELET # BLD: 179 E9/L (ref 130–450)
PLATELET # BLD: 224 E9/L (ref 130–450)
PMV BLD AUTO: 10.9 FL (ref 7–12)
PMV BLD AUTO: 11.4 FL (ref 7–12)
POTASSIUM SERPL-SCNC: 4.3 MMOL/L (ref 3.5–5)
POTASSIUM SERPL-SCNC: 4.6 MMOL/L (ref 3.5–5)
RBC # BLD: 2.46 E12/L (ref 3.8–5.8)
RBC # BLD: 2.59 E12/L (ref 3.8–5.8)
SODIUM BLD-SCNC: 133 MMOL/L (ref 132–146)
SODIUM BLD-SCNC: 134 MMOL/L (ref 132–146)
WBC # BLD: 18.8 E9/L (ref 4.5–11.5)
WBC # BLD: 26.2 E9/L (ref 4.5–11.5)

## 2020-05-01 PROCEDURE — 2700000000 HC OXYGEN THERAPY PER DAY

## 2020-05-01 PROCEDURE — 6360000002 HC RX W HCPCS: Performed by: STUDENT IN AN ORGANIZED HEALTH CARE EDUCATION/TRAINING PROGRAM

## 2020-05-01 PROCEDURE — 82962 GLUCOSE BLOOD TEST: CPT

## 2020-05-01 PROCEDURE — 2580000003 HC RX 258: Performed by: SURGERY

## 2020-05-01 PROCEDURE — 76770 US EXAM ABDO BACK WALL COMP: CPT

## 2020-05-01 PROCEDURE — 94770 HC ETCO2 MONITOR DAILY: CPT

## 2020-05-01 PROCEDURE — 2580000003 HC RX 258: Performed by: NURSE PRACTITIONER

## 2020-05-01 PROCEDURE — 86900 BLOOD TYPING SEROLOGIC ABO: CPT

## 2020-05-01 PROCEDURE — 85025 COMPLETE CBC W/AUTO DIFF WBC: CPT

## 2020-05-01 PROCEDURE — 2580000003 HC RX 258: Performed by: STUDENT IN AN ORGANIZED HEALTH CARE EDUCATION/TRAINING PROGRAM

## 2020-05-01 PROCEDURE — 85027 COMPLETE CBC AUTOMATED: CPT

## 2020-05-01 PROCEDURE — 94660 CPAP INITIATION&MGMT: CPT

## 2020-05-01 PROCEDURE — P9016 RBC LEUKOCYTES REDUCED: HCPCS

## 2020-05-01 PROCEDURE — 86923 COMPATIBILITY TEST ELECTRIC: CPT

## 2020-05-01 PROCEDURE — 80048 BASIC METABOLIC PNL TOTAL CA: CPT

## 2020-05-01 PROCEDURE — 86850 RBC ANTIBODY SCREEN: CPT

## 2020-05-01 PROCEDURE — 36430 TRANSFUSION BLD/BLD COMPNT: CPT

## 2020-05-01 PROCEDURE — 6370000000 HC RX 637 (ALT 250 FOR IP): Performed by: STUDENT IN AN ORGANIZED HEALTH CARE EDUCATION/TRAINING PROGRAM

## 2020-05-01 PROCEDURE — 6360000002 HC RX W HCPCS: Performed by: NURSE PRACTITIONER

## 2020-05-01 PROCEDURE — 1200000000 HC SEMI PRIVATE

## 2020-05-01 PROCEDURE — 36415 COLL VENOUS BLD VENIPUNCTURE: CPT

## 2020-05-01 PROCEDURE — 86901 BLOOD TYPING SEROLOGIC RH(D): CPT

## 2020-05-01 RX ORDER — SODIUM CHLORIDE 9 MG/ML
INJECTION, SOLUTION INTRAVENOUS EVERY 12 HOURS
Status: DISCONTINUED | OUTPATIENT
Start: 2020-05-01 | End: 2020-05-02

## 2020-05-01 RX ORDER — 0.9 % SODIUM CHLORIDE 0.9 %
10 INTRAVENOUS SOLUTION INTRAVENOUS ONCE
Status: COMPLETED | OUTPATIENT
Start: 2020-05-01 | End: 2020-05-01

## 2020-05-01 RX ORDER — 0.9 % SODIUM CHLORIDE 0.9 %
20 INTRAVENOUS SOLUTION INTRAVENOUS ONCE
Status: DISCONTINUED | OUTPATIENT
Start: 2020-05-01 | End: 2020-05-04 | Stop reason: HOSPADM

## 2020-05-01 RX ADMIN — Medication: at 16:39

## 2020-05-01 RX ADMIN — SODIUM CHLORIDE: 9 INJECTION, SOLUTION INTRAVENOUS at 08:51

## 2020-05-01 RX ADMIN — DOCUSATE SODIUM 100 MG: 100 CAPSULE, LIQUID FILLED ORAL at 21:40

## 2020-05-01 RX ADMIN — INSULIN LISPRO 2 UNITS: 100 INJECTION, SOLUTION INTRAVENOUS; SUBCUTANEOUS at 17:34

## 2020-05-01 RX ADMIN — CILOSTAZOL 100 MG: 100 TABLET ORAL at 21:40

## 2020-05-01 RX ADMIN — FERROUS SULFATE TAB 325 MG (65 MG ELEMENTAL FE) 325 MG: 325 (65 FE) TAB at 17:33

## 2020-05-01 RX ADMIN — INSULIN LISPRO 2 UNITS: 100 INJECTION, SOLUTION INTRAVENOUS; SUBCUTANEOUS at 13:16

## 2020-05-01 RX ADMIN — METOPROLOL TARTRATE 25 MG: 25 TABLET, FILM COATED ORAL at 09:01

## 2020-05-01 RX ADMIN — Medication 6 MG: at 04:34

## 2020-05-01 RX ADMIN — BACITRACIN ZINC: 500 OINTMENT TOPICAL at 21:40

## 2020-05-01 RX ADMIN — CEFEPIME HYDROCHLORIDE 2 G: 2 INJECTION, POWDER, FOR SOLUTION INTRAVENOUS at 17:33

## 2020-05-01 RX ADMIN — SODIUM CHLORIDE 1520 ML: 9 INJECTION, SOLUTION INTRAVENOUS at 12:10

## 2020-05-01 RX ADMIN — SPIRONOLACTONE 50 MG: 25 TABLET ORAL at 08:50

## 2020-05-01 RX ADMIN — MICONAZOLE NITRATE: 20.6 POWDER TOPICAL at 21:53

## 2020-05-01 RX ADMIN — PREGABALIN 200 MG: 100 CAPSULE ORAL at 13:23

## 2020-05-01 RX ADMIN — PREGABALIN 200 MG: 100 CAPSULE ORAL at 08:54

## 2020-05-01 RX ADMIN — CLONIDINE HYDROCHLORIDE 0.1 MG: 0.1 TABLET ORAL at 21:40

## 2020-05-01 RX ADMIN — ATORVASTATIN CALCIUM 10 MG: 10 TABLET, FILM COATED ORAL at 21:40

## 2020-05-01 RX ADMIN — PREGABALIN 200 MG: 100 CAPSULE ORAL at 21:40

## 2020-05-01 RX ADMIN — SODIUM CHLORIDE, PRESERVATIVE FREE 300 UNITS: 5 INJECTION INTRAVENOUS at 21:41

## 2020-05-01 RX ADMIN — ENOXAPARIN SODIUM 40 MG: 100 INJECTION SUBCUTANEOUS at 09:02

## 2020-05-01 RX ADMIN — CEFEPIME 2 G: 2 INJECTION, POWDER, FOR SOLUTION INTRAVENOUS at 04:43

## 2020-05-01 RX ADMIN — AMLODIPINE BESYLATE 5 MG: 5 TABLET ORAL at 08:51

## 2020-05-01 RX ADMIN — LUBIPROSTONE 24 MCG: 24 CAPSULE, GELATIN COATED ORAL at 17:33

## 2020-05-01 RX ADMIN — INSULIN GLARGINE 15 UNITS: 100 INJECTION, SOLUTION SUBCUTANEOUS at 08:57

## 2020-05-01 RX ADMIN — Medication 10 ML: at 21:41

## 2020-05-01 RX ADMIN — INSULIN GLARGINE 15 UNITS: 100 INJECTION, SOLUTION SUBCUTANEOUS at 21:44

## 2020-05-01 RX ADMIN — CLONIDINE HYDROCHLORIDE 0.1 MG: 0.1 TABLET ORAL at 08:50

## 2020-05-01 RX ADMIN — TAMSULOSIN HYDROCHLORIDE 0.4 MG: 0.4 CAPSULE ORAL at 09:00

## 2020-05-01 RX ADMIN — STANDARDIZED SENNA CONCENTRATE 8.6 MG: 8.6 TABLET ORAL at 21:39

## 2020-05-01 RX ADMIN — SODIUM CHLORIDE, PRESERVATIVE FREE 300 UNITS: 5 INJECTION INTRAVENOUS at 09:01

## 2020-05-01 RX ADMIN — BISACODYL 10 MG: 5 TABLET, COATED ORAL at 21:40

## 2020-05-01 RX ADMIN — METOPROLOL TARTRATE 25 MG: 25 TABLET, FILM COATED ORAL at 21:43

## 2020-05-01 ASSESSMENT — PAIN SCALES - GENERAL
PAINLEVEL_OUTOF10: 10
PAINLEVEL_OUTOF10: 0
PAINLEVEL_OUTOF10: 10
PAINLEVEL_OUTOF10: 0

## 2020-05-01 NOTE — PROGRESS NOTES
OT BEDSIDE TREATMENT NOTE      Date:2020  Patient Name: Claude Loomis  MRN: [id number]  : 1955  Room: [facility]-A         Attempted occupational therapy this date, however pt unavailable due to being off unit, will continue to assess at later date/time. Continue current POC.          Juan BUCK/CHRISSIE [id number]

## 2020-05-01 NOTE — PROGRESS NOTES
Subjective: The patient is awake and alert. appears  to be in pain   Has not been eating d/t no appetite from being \" worn out\"    Objective:    /68   Pulse 87   Temp 98.7 °F (37.1 °C) (Temporal)   Resp 18   Ht 6' 2\" (1.88 m)   Wt (!) 335 lb (152 kg)   SpO2 98%   BMI 43.01 kg/m²     In: 830 [P.O.:480; Blood:350]  Out: 765     HEENT: NCAT,  PERRLA, No JVD  Heart:  RRR, no murmurs, gallops, or rubs.   Lungs:  CTA bilaterally, no wheeze, rales or rhonchi  Abd: bowel sounds present, nontender, nondistended, no masses  Extrem: Stump of right in dressing     Recent Labs     04/29/20  0527 04/30/20  0523 04/30/20  1900 05/01/20  0450   WBC 24.4* 16.4*  --  26.2*   HGB 7.8* 6.6* 8.0* 7.2*   HCT 24.1* 20.8* 24.5* 21.9*    210  --  224       Recent Labs     04/29/20  0527 04/30/20  0523 05/01/20  0450    136 133   K 5.0 4.3 4.6   CL 99 103 103   CO2 23 22 22   BUN 23 28* 36*   CREATININE 1.7* 2.2* 2.8*   CALCIUM 8.8 8.8 8.5*       Assessment:    Patient Active Problem List   Diagnosis    DM II (diabetes mellitus, type II), controlled ([address]    HTN (hypertension)    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)    Coagulopathy (HCC)    Moderate protein-calorie malnutrition (HCC)    PVD (peripheral vascular disease) (HCC)    Leukocytosis    Stage 3 chronic kidney disease (HCC)    Tobacco dependence    HLD (hyperlipidemia)    History of DVT (deep vein thrombosis)    Osteomyelitis (HCC)    S/P AKA (above knee amputation), right (HCC)    History of vascular surgery    Occlusion of common femoral artery (HCC)    Cellulitis, scrotum    Hyperglycemia due to type 2 diabetes mellitus ([address]    Critical lower limb ischemia    Gas gangrene of thigh ([address]    Vascular occlusion    Wound infection    Postoperative wound infection    Ischemic ulcer of right thigh with fat layer exposed ([address]    Ischemic ulcer of right thigh with necrosis of muscle ([address] Plan:    Admit to telemetry for evaluation of right thigh stump abscess-unhealed  S/p OR 4/17, 4/20, 4/21, 4/23, 4/28  for further excisional debridements   4/30 Irrigation and excisional debridement of the Right thigh above knee amputation wound 25 x45 cm  Broad-spectrum IV antibiotic therapy with  cefepime- dose adjustments for renal fucntion  Surgical cultures from right stump with Pseudomonas and E. coli, currently on appropriate antibiotics-white count improved  infectious disease evaluation-appreciated  Vascular surgery and plastics following   Resume home medications with appropriate dose adjustments  AC /at bedtime blood sugar checks with insulin sliding scale-adequately controlled  Adjust BP meds for accelerated hypertension-much better controlled  Adjust pain meds if needed-optimally controlled   bowel regimen with zee lax, enema suppository prn, lactulose- constipated dt immobility and narcotics   Pain regimen- on PCA pump with IV Dilaudid-pain intermittent still   Start nutrient shakes     DVT Prophylaxis   PT/OT  Discharge planning       All consultants notes reviewed    Eddie Elmore MD  11:58 AM  5/1/2020

## 2020-05-01 NOTE — CONSULTS
Nephrology Consult  [facility]  Jono Brandon MD    CC:   Acute kidney injury on CKD stage III    HPI:     This is a 61 y. o. male with uncontrolled diabetes and hx of R AKA in 2018 (Dr Diana Armstrong was able to ambulate afterwards but presented 3/20/20 with acute RLE ischemia and underwent the following operation with Dr Errol Kwan:  Right groin exploration Right iliofemoral artery embolectomy with primary closure of arteriotomy Right deep femoral artery embolectomy Right lateral thigh diagnostic I&D (negative) for concern of NSTI on prior imaging Patient was discharged on POD#5 after above, and exam that day was: \"groin incision c/d/i, stump with bruising distally, no signs of infection, scrotum with penrose drain, surrounding tissue soft with no signs of cellulitis\". Patient started having drainage from both incisions ~2 weeks ago, but because he was also having fevers, vomiting, and diarrhea, he was kept in quarantine and haf not followed up with Dr Errol Kwan in clinic. Over the past several years he has been on various antibiotics and had treatment to save his right leg and stump. He is post op right AKA with debridement done 4/30. The patient has been followed by our service in the past, but not recently. He states he has not followed with anyone regarding his kidneys for some time. In 2018 his baseline creatinine was noted to be 2.0 by Dr. Kely Spear. He states he has relocated to this area and has been at [facility] most recently. PTA he was on lisinopril, aldactone and  Keflex.  During the past month he  has been treated with cefapime,  Dapto( last dose 4/23), Zosyn (last dose 4/16),  his lisinopril  And aldactone had been continues(will stop)  Last doses  of vancomycin appear to have been 4/17/2020          PMH:    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 ([facility] [facility] 5/22/2018    Atherosclerosis of nonbiological bypass

## 2020-05-01 NOTE — PROGRESS NOTES
Vascular Surgery Progress Note    Pt is being seen in f/u today regarding R AKA  Subjective  Pt s/e. Denies increased sob. Pain reasonably controlled in stump.  + UO overnight.     Current Medications:    sodium chloride      HYDROmorphone 0  (04/30/20 0735)    sodium chloride 75 mL/hr at 04/20/20 0405    dextrose        trimethobenzamide, sodium chloride flush, heparin flush, bisacodyl, acetaminophen, naloxone, calcium carbonate, oxyCODONE **OR** oxyCODONE, labetalol, sodium chloride flush, tiZANidine, magnesium hydroxide, promethazine **OR** ondansetron, glucose, dextrose, glucagon (rDNA), dextrose    sodium chloride  10 mL/kg Intravenous Once    cefepime  2 g Intravenous Q12H    sodium chloride  20 mL Intravenous Once    bacitracin zinc   Topical BID    insulin lispro  0-12 Units Subcutaneous TID WC    insulin lispro  0-6 Units Subcutaneous Nightly    lidocaine PF  5 mL Intradermal Once    heparin flush  3 mL Intravenous 2 times per day    sodium hypochlorite   Irrigation Once    lactulose  20 g Oral BID    bisacodyl  10 mg Oral BID    polyethylene glycol  17 g Oral Daily    sodium hypochlorite   Irrigation Daily    sodium chloride flush  10 mL Intravenous 2 times per day    amLODIPine  5 mg Oral Daily    aspirin  81 mg Oral Daily    atorvastatin  10 mg Oral Daily    cilostazol  100 mg Oral BID    cloNIDine  0.1 mg Oral BID    docusate sodium  100 mg Oral BID    DULoxetine  120 mg Oral QAM    pantoprazole  40 mg Oral QAM AC    ferrous sulfate  325 mg Oral BID WC    insulin glargine  15 Units Subcutaneous BID    lidocaine  1 patch Transdermal Daily    lisinopril  10 mg Oral Daily    lubiprostone  24 mcg Oral BID WC    metoprolol  25 mg Oral BID    miconazole   Topical BID    pregabalin  200 mg Oral TID    senna  1 tablet Oral Daily    spironolactone  50 mg Oral QAM    tamsulosin  0.4 mg Oral Daily    enoxaparin  40 mg Subcutaneous Daily      PHYSICAL EXAM:    /68

## 2020-05-02 LAB
ANION GAP SERPL CALCULATED.3IONS-SCNC: 10 MMOL/L (ref 7–16)
ANION GAP SERPL CALCULATED.3IONS-SCNC: 8 MMOL/L (ref 7–16)
BUN BLDV-MCNC: 33 MG/DL (ref 8–23)
BUN BLDV-MCNC: 38 MG/DL (ref 8–23)
CALCIUM SERPL-MCNC: 6.9 MG/DL (ref 8.6–10.2)
CALCIUM SERPL-MCNC: 8.2 MG/DL (ref 8.6–10.2)
CHLORIDE BLD-SCNC: 108 MMOL/L (ref 98–107)
CHLORIDE BLD-SCNC: 110 MMOL/L (ref 98–107)
CHLORIDE URINE RANDOM: 65 MMOL/L
CO2: 17 MMOL/L (ref 22–29)
CO2: 19 MMOL/L (ref 22–29)
CREAT SERPL-MCNC: 2.7 MG/DL (ref 0.7–1.2)
CREAT SERPL-MCNC: 3.1 MG/DL (ref 0.7–1.2)
CREATININE URINE: 82 MG/DL (ref 40–278)
CREATININE URINE: 83 MG/DL (ref 40–278)
EOSINOPHIL, URINE: 0 % (ref 0–1)
GFR AFRICAN AMERICAN: 25
GFR AFRICAN AMERICAN: 29
GFR NON-AFRICAN AMERICAN: 25 ML/MIN/1.73
GFR NON-AFRICAN AMERICAN: 29 ML/MIN/1.73
GLUCOSE BLD-MCNC: 157 MG/DL (ref 74–99)
GLUCOSE BLD-MCNC: 172 MG/DL (ref 74–99)
HCT VFR BLD CALC: 21.9 % (ref 37–54)
HCT VFR BLD CALC: 22.7 % (ref 37–54)
HCT VFR BLD CALC: 22.8 % (ref 37–54)
HEMOGLOBIN: 7.2 G/DL (ref 12.5–16.5)
HEMOGLOBIN: 7.4 G/DL (ref 12.5–16.5)
HEMOGLOBIN: 7.5 G/DL (ref 12.5–16.5)
MCH RBC QN AUTO: 27.5 PG (ref 26–35)
MCH RBC QN AUTO: 27.8 PG (ref 26–35)
MCHC RBC AUTO-ENTMCNC: 32.5 % (ref 32–34.5)
MCHC RBC AUTO-ENTMCNC: 32.9 % (ref 32–34.5)
MCV RBC AUTO: 84.6 FL (ref 80–99.9)
MCV RBC AUTO: 84.8 FL (ref 80–99.9)
METER GLUCOSE: 148 MG/DL (ref 74–99)
METER GLUCOSE: 158 MG/DL (ref 74–99)
METER GLUCOSE: 198 MG/DL (ref 74–99)
METER GLUCOSE: 201 MG/DL (ref 74–99)
MICROALBUMIN UR-MCNC: 111.7 MG/L
MICROALBUMIN/CREAT UR-RTO: 134.6 (ref 0–30)
PDW BLD-RTO: 14.5 FL (ref 11.5–15)
PDW BLD-RTO: 14.6 FL (ref 11.5–15)
PLATELET # BLD: 175 E9/L (ref 130–450)
PLATELET # BLD: 187 E9/L (ref 130–450)
PMV BLD AUTO: 10.9 FL (ref 7–12)
PMV BLD AUTO: 11.6 FL (ref 7–12)
POTASSIUM SERPL-SCNC: 3.7 MMOL/L (ref 3.5–5)
POTASSIUM SERPL-SCNC: 4 MMOL/L (ref 3.5–5)
POTASSIUM, UR: 20.1 MMOL/L
PROCALCITONIN: 0.58 NG/ML (ref 0–0.08)
RBC # BLD: 2.59 E12/L (ref 3.8–5.8)
RBC # BLD: 2.69 E12/L (ref 3.8–5.8)
SODIUM BLD-SCNC: 135 MMOL/L (ref 132–146)
SODIUM BLD-SCNC: 137 MMOL/L (ref 132–146)
SODIUM URINE: 72 MMOL/L
WBC # BLD: 15.3 E9/L (ref 4.5–11.5)
WBC # BLD: 16.3 E9/L (ref 4.5–11.5)

## 2020-05-02 PROCEDURE — 82962 GLUCOSE BLOOD TEST: CPT

## 2020-05-02 PROCEDURE — 6370000000 HC RX 637 (ALT 250 FOR IP): Performed by: SURGERY

## 2020-05-02 PROCEDURE — 1200000000 HC SEMI PRIVATE

## 2020-05-02 PROCEDURE — 84133 ASSAY OF URINE POTASSIUM: CPT

## 2020-05-02 PROCEDURE — 82044 UR ALBUMIN SEMIQUANTITATIVE: CPT

## 2020-05-02 PROCEDURE — 82436 ASSAY OF URINE CHLORIDE: CPT

## 2020-05-02 PROCEDURE — 85014 HEMATOCRIT: CPT

## 2020-05-02 PROCEDURE — 85018 HEMOGLOBIN: CPT

## 2020-05-02 PROCEDURE — 2580000003 HC RX 258: Performed by: STUDENT IN AN ORGANIZED HEALTH CARE EDUCATION/TRAINING PROGRAM

## 2020-05-02 PROCEDURE — 84145 PROCALCITONIN (PCT): CPT

## 2020-05-02 PROCEDURE — 82570 ASSAY OF URINE CREATININE: CPT

## 2020-05-02 PROCEDURE — 85027 COMPLETE CBC AUTOMATED: CPT

## 2020-05-02 PROCEDURE — 2580000003 HC RX 258: Performed by: NURSE PRACTITIONER

## 2020-05-02 PROCEDURE — 6360000002 HC RX W HCPCS: Performed by: STUDENT IN AN ORGANIZED HEALTH CARE EDUCATION/TRAINING PROGRAM

## 2020-05-02 PROCEDURE — 36415 COLL VENOUS BLD VENIPUNCTURE: CPT

## 2020-05-02 PROCEDURE — 80048 BASIC METABOLIC PNL TOTAL CA: CPT

## 2020-05-02 PROCEDURE — 6360000002 HC RX W HCPCS: Performed by: NURSE PRACTITIONER

## 2020-05-02 PROCEDURE — 2580000003 HC RX 258: Performed by: SURGERY

## 2020-05-02 PROCEDURE — 6370000000 HC RX 637 (ALT 250 FOR IP): Performed by: STUDENT IN AN ORGANIZED HEALTH CARE EDUCATION/TRAINING PROGRAM

## 2020-05-02 PROCEDURE — 2500000003 HC RX 250 WO HCPCS: Performed by: STUDENT IN AN ORGANIZED HEALTH CARE EDUCATION/TRAINING PROGRAM

## 2020-05-02 PROCEDURE — 84300 ASSAY OF URINE SODIUM: CPT

## 2020-05-02 PROCEDURE — 87205 SMEAR GRAM STAIN: CPT

## 2020-05-02 PROCEDURE — 94660 CPAP INITIATION&MGMT: CPT

## 2020-05-02 RX ORDER — LACTULOSE 10 G/15ML
30 SOLUTION ORAL 3 TIMES DAILY
Status: DISCONTINUED | OUTPATIENT
Start: 2020-05-02 | End: 2020-05-04 | Stop reason: HOSPADM

## 2020-05-02 RX ORDER — ACETAMINOPHEN 325 MG/1
650 TABLET ORAL
Status: DISCONTINUED | OUTPATIENT
Start: 2020-05-02 | End: 2020-05-04 | Stop reason: HOSPADM

## 2020-05-02 RX ORDER — SODIUM CHLORIDE 9 MG/ML
INJECTION, SOLUTION INTRAVENOUS EVERY 12 HOURS
Status: DISCONTINUED | OUTPATIENT
Start: 2020-05-02 | End: 2020-05-04 | Stop reason: HOSPADM

## 2020-05-02 RX ORDER — OXYCODONE HYDROCHLORIDE 5 MG/1
5 TABLET ORAL EVERY 6 HOURS
Status: DISCONTINUED | OUTPATIENT
Start: 2020-05-02 | End: 2020-05-04 | Stop reason: HOSPADM

## 2020-05-02 RX ADMIN — METOPROLOL TARTRATE 25 MG: 25 TABLET, FILM COATED ORAL at 21:23

## 2020-05-02 RX ADMIN — SODIUM CHLORIDE: 9 INJECTION, SOLUTION INTRAVENOUS at 16:17

## 2020-05-02 RX ADMIN — SODIUM CHLORIDE: 9 INJECTION, SOLUTION INTRAVENOUS at 04:04

## 2020-05-02 RX ADMIN — CEFEPIME HYDROCHLORIDE 2 G: 2 INJECTION, POWDER, FOR SOLUTION INTRAVENOUS at 04:56

## 2020-05-02 RX ADMIN — INSULIN GLARGINE 15 UNITS: 100 INJECTION, SOLUTION SUBCUTANEOUS at 21:26

## 2020-05-02 RX ADMIN — PREGABALIN 200 MG: 100 CAPSULE ORAL at 21:23

## 2020-05-02 RX ADMIN — AMLODIPINE BESYLATE 5 MG: 5 TABLET ORAL at 09:00

## 2020-05-02 RX ADMIN — BISACODYL 10 MG: 5 TABLET, COATED ORAL at 21:23

## 2020-05-02 RX ADMIN — Medication 10 ML: at 09:16

## 2020-05-02 RX ADMIN — INSULIN LISPRO 2 UNITS: 100 INJECTION, SOLUTION INTRAVENOUS; SUBCUTANEOUS at 13:14

## 2020-05-02 RX ADMIN — SODIUM CHLORIDE: 9 INJECTION, SOLUTION INTRAVENOUS at 09:01

## 2020-05-02 RX ADMIN — DULOXETINE HYDROCHLORIDE 120 MG: 60 CAPSULE, DELAYED RELEASE ORAL at 09:00

## 2020-05-02 RX ADMIN — BACITRACIN ZINC: 500 OINTMENT TOPICAL at 09:01

## 2020-05-02 RX ADMIN — OXYCODONE 5 MG: 5 TABLET ORAL at 11:16

## 2020-05-02 RX ADMIN — INSULIN GLARGINE 15 UNITS: 100 INJECTION, SOLUTION SUBCUTANEOUS at 09:12

## 2020-05-02 RX ADMIN — DOCUSATE SODIUM 100 MG: 100 CAPSULE, LIQUID FILLED ORAL at 21:23

## 2020-05-02 RX ADMIN — INSULIN LISPRO 2 UNITS: 100 INJECTION, SOLUTION INTRAVENOUS; SUBCUTANEOUS at 09:11

## 2020-05-02 RX ADMIN — CILOSTAZOL 100 MG: 100 TABLET ORAL at 21:25

## 2020-05-02 RX ADMIN — SODIUM CHLORIDE, PRESERVATIVE FREE 300 UNITS: 5 INJECTION INTRAVENOUS at 21:24

## 2020-05-02 RX ADMIN — OXYCODONE 5 MG: 5 TABLET ORAL at 17:20

## 2020-05-02 RX ADMIN — CLONIDINE HYDROCHLORIDE 0.1 MG: 0.1 TABLET ORAL at 21:22

## 2020-05-02 RX ADMIN — ATORVASTATIN CALCIUM 10 MG: 10 TABLET, FILM COATED ORAL at 21:22

## 2020-05-02 RX ADMIN — SODIUM CHLORIDE, PRESERVATIVE FREE 300 UNITS: 5 INJECTION INTRAVENOUS at 09:08

## 2020-05-02 RX ADMIN — CEFEPIME HYDROCHLORIDE 1 G: 1 INJECTION, POWDER, FOR SOLUTION INTRAMUSCULAR; INTRAVENOUS at 17:20

## 2020-05-02 RX ADMIN — CLONIDINE HYDROCHLORIDE 0.1 MG: 0.1 TABLET ORAL at 09:00

## 2020-05-02 RX ADMIN — TAMSULOSIN HYDROCHLORIDE 0.4 MG: 0.4 CAPSULE ORAL at 09:03

## 2020-05-02 RX ADMIN — Medication 10 ML: at 21:23

## 2020-05-02 RX ADMIN — METOPROLOL TARTRATE 25 MG: 25 TABLET, FILM COATED ORAL at 09:08

## 2020-05-02 RX ADMIN — ASPIRIN 81 MG 81 MG: 81 TABLET ORAL at 08:59

## 2020-05-02 RX ADMIN — MICONAZOLE NITRATE: 20.6 POWDER TOPICAL at 23:15

## 2020-05-02 RX ADMIN — INSULIN LISPRO 4 UNITS: 100 INJECTION, SOLUTION INTRAVENOUS; SUBCUTANEOUS at 17:24

## 2020-05-02 RX ADMIN — STANDARDIZED SENNA CONCENTRATE 8.6 MG: 8.6 TABLET ORAL at 21:23

## 2020-05-02 RX ADMIN — OXYCODONE 5 MG: 5 TABLET ORAL at 23:23

## 2020-05-02 RX ADMIN — Medication: at 09:28

## 2020-05-02 RX ADMIN — FERROUS SULFATE TAB 325 MG (65 MG ELEMENTAL FE) 325 MG: 325 (65 FE) TAB at 17:20

## 2020-05-02 RX ADMIN — PREGABALIN 200 MG: 100 CAPSULE ORAL at 08:59

## 2020-05-02 RX ADMIN — CILOSTAZOL 100 MG: 100 TABLET ORAL at 09:02

## 2020-05-02 RX ADMIN — PREGABALIN 200 MG: 100 CAPSULE ORAL at 13:27

## 2020-05-02 RX ADMIN — LUBIPROSTONE 24 MCG: 24 CAPSULE, GELATIN COATED ORAL at 17:22

## 2020-05-02 RX ADMIN — ENOXAPARIN SODIUM 40 MG: 100 INJECTION SUBCUTANEOUS at 09:08

## 2020-05-02 ASSESSMENT — PAIN DESCRIPTION - LOCATION
LOCATION: BACK
LOCATION: LEG;BACK
LOCATION: BACK
LOCATION: LEG;BACK

## 2020-05-02 ASSESSMENT — PAIN SCALES - GENERAL
PAINLEVEL_OUTOF10: 0
PAINLEVEL_OUTOF10: 4
PAINLEVEL_OUTOF10: 8
PAINLEVEL_OUTOF10: 10
PAINLEVEL_OUTOF10: 5
PAINLEVEL_OUTOF10: 0
PAINLEVEL_OUTOF10: 6
PAINLEVEL_OUTOF10: 0

## 2020-05-02 ASSESSMENT — PAIN DESCRIPTION - DESCRIPTORS
DESCRIPTORS: ACHING;DISCOMFORT
DESCRIPTORS: ACHING;CONSTANT;DISCOMFORT
DESCRIPTORS: ACHING

## 2020-05-02 ASSESSMENT — PAIN DESCRIPTION - FREQUENCY
FREQUENCY: CONTINUOUS
FREQUENCY: CONTINUOUS

## 2020-05-02 ASSESSMENT — PAIN DESCRIPTION - PAIN TYPE
TYPE: ACUTE PAIN;SURGICAL PAIN
TYPE: ACUTE PAIN;SURGICAL PAIN
TYPE: SURGICAL PAIN
TYPE: SURGICAL PAIN
TYPE: ACUTE PAIN;SURGICAL PAIN

## 2020-05-02 ASSESSMENT — PAIN DESCRIPTION - ORIENTATION
ORIENTATION: RIGHT

## 2020-05-02 ASSESSMENT — PAIN DESCRIPTION - ONSET
ONSET: ON-GOING
ONSET: ON-GOING

## 2020-05-02 NOTE — PROGRESS NOTES
glargine  15 Units Subcutaneous BID    lidocaine  1 patch Transdermal Daily    lubiprostone  24 mcg Oral BID WC    metoprolol  25 mg Oral BID    miconazole   Topical BID    pregabalin  200 mg Oral TID    senna  1 tablet Oral Daily    tamsulosin  0.4 mg Oral Daily    enoxaparin  40 mg Subcutaneous Daily       MEDS (infusions):   sodium chloride 12.5 mL/hr at 05/02/20 0901    HYDROmorphone 0  (04/30/20 0735)    sodium chloride 150 mL/hr at 05/02/20 0404    dextrose         MEDS (prn):  trimethobenzamide, sodium chloride flush, heparin flush, bisacodyl, naloxone, calcium carbonate, oxyCODONE **OR** oxyCODONE, labetalol, sodium chloride flush, tiZANidine, magnesium hydroxide, promethazine **OR** ondansetron, glucose, dextrose, glucagon (rDNA), dextrose    DATA:    Recent Labs     05/01/20  0450 05/01/20  2200 05/02/20  0140 05/02/20  0500   WBC 26.2* 18.8*  --  16.3*   HGB 7.2* 6.8* 7.5* 7.4*   HCT 21.9* 21.1* 22.7* 22.8*   MCV 84.6 85.8  --  84.8    179  --  187     Recent Labs     05/01/20  1023 05/02/20  0020 05/02/20  0500    135 137   K 4.3 3.7 4.0    110* 108*   CO2 21* 17* 19*   BUN 39* 33* 38*   CREATININE 2.9* 2.7* 3.1*   LABGLOM 27 29 25   GLUCOSE 212* 172* 157*   CALCIUM 8.4* 6.9* 8.2*       Lab Results   Component Value Date    LABALBU 2.4 (L) 04/28/2020    LABALBU 2.2 (L) 04/24/2020    LABALBU 2.7 (L) 04/22/2020     Lab Results   Component Value Date    TSH 2.270 03/23/2020       Iron Studies  Lab Results   Component Value Date    IRON 20 (L) 10/25/2018    TIBC 146 (L) 10/25/2018    FERRITIN 1,089 05/23/2018     Vitamin B-12   Date Value Ref Range Status   10/25/2018 522 211 - 946 pg/mL Final     Folate   Date Value Ref Range Status   10/25/2018 9.8 4.8 - 24.2 ng/mL Final       Vit D, 25-Hydroxy   Date Value Ref Range Status   05/23/2018 24 (L) 30 - 100 ng/mL Final     Comment:     <20 ng/mL. ........... Aishwarya He Deficient  20-30 ng/mL. ......... Aishwarya He Insufficient

## 2020-05-02 NOTE — PROGRESS NOTES
Vascular Surgery Progress Note    Pt is being seen in f/u today regarding R AKA  Subjective  Increasing UOP (from 0.25/kg/hr to 0.4/kg/hr) after 1L IVF and 2u PRBC over last 24hrs. Pain controlled on PCA, he doesn't feel ready to transition to prn's.     Current Medications:    sodium chloride      HYDROmorphone 0  (04/30/20 0735)    sodium chloride 150 mL/hr at 05/02/20 0404    dextrose        trimethobenzamide, sodium chloride flush, heparin flush, bisacodyl, acetaminophen, naloxone, calcium carbonate, oxyCODONE **OR** oxyCODONE, labetalol, sodium chloride flush, tiZANidine, magnesium hydroxide, promethazine **OR** ondansetron, glucose, dextrose, glucagon (rDNA), dextrose    cefepime  2 g Intravenous Q12H    sodium chloride  20 mL Intravenous Once    sodium chloride  20 mL Intravenous Once    bacitracin zinc   Topical BID    insulin lispro  0-12 Units Subcutaneous TID WC    insulin lispro  0-6 Units Subcutaneous Nightly    lidocaine PF  5 mL Intradermal Once    heparin flush  3 mL Intravenous 2 times per day    sodium hypochlorite   Irrigation Once    lactulose  20 g Oral BID    bisacodyl  10 mg Oral BID    polyethylene glycol  17 g Oral Daily    sodium hypochlorite   Irrigation Daily    sodium chloride flush  10 mL Intravenous 2 times per day    amLODIPine  5 mg Oral Daily    aspirin  81 mg Oral Daily    atorvastatin  10 mg Oral Daily    cilostazol  100 mg Oral BID    cloNIDine  0.1 mg Oral BID    docusate sodium  100 mg Oral BID    DULoxetine  120 mg Oral QAM    pantoprazole  40 mg Oral QAM AC    ferrous sulfate  325 mg Oral BID WC    insulin glargine  15 Units Subcutaneous BID    lidocaine  1 patch Transdermal Daily    lubiprostone  24 mcg Oral BID WC    metoprolol  25 mg Oral BID    miconazole   Topical BID    pregabalin  200 mg Oral TID    senna  1 tablet Oral Daily    tamsulosin  0.4 mg Oral Daily    enoxaparin  40 mg Subcutaneous Daily      PHYSICAL EXAM:    BP (!) 124/57

## 2020-05-02 NOTE — PROGRESS NOTES
senna  1 tablet Oral Daily    tamsulosin  0.4 mg Oral Daily    enoxaparin  40 mg Subcutaneous Daily     Continuous Infusions:   sodium chloride 12.5 mL/hr at 20 0901    HYDROmorphone 0  (20 0735)    sodium chloride 150 mL/hr at 20 0404    dextrose       PRN Meds:trimethobenzamide, sodium chloride flush, heparin flush, bisacodyl, naloxone, calcium carbonate, oxyCODONE **OR** oxyCODONE, labetalol, sodium chloride flush, tiZANidine, magnesium hydroxide, promethazine **OR** ondansetron, glucose, dextrose, glucagon (rDNA), dextrose    OBJECTIVE:  BP (!) 124/57   Pulse 84   Temp 98.6 °F (37 °C) (Temporal)   Resp 18   Ht 6' 2\" (1.88 m)   Wt (!) 335 lb (152 kg)   SpO2 99%   BMI 43.01 kg/m²   Temp  Av.9 °F (36.6 °C)  Min: 96.8 °F (36 °C)  Max: 98.6 °F (37 °C)  Constitutional: The patient is awake, alert, and oriented. Skin: Warm and dry. No rashes were noted. Pre op pictures              Postop  2020  picture after debridement        2020            HEENT: Round and reactive pupils. Moist mucous membranes. No ulcerations or thrush. Neck: Supple to movements. Chest: No use of accessory muscles to breathe. Symmetrical expansion. No wheezing, crackles or rhonchi. Cardiovascular: S1 and S2 are rhythmic and regular. No murmurs appreciated. Abdomen: Positive bowel sounds to auscultation. Benign to palpation. No masses felt. No hepatosplenomegaly. Genitourinary: Male  Extremities: No clubbing, no cyanosis, no edema.   See pictures above  Lines: peripheral  picc 20  Laboratory and Tests Review:  Lab Results   Component Value Date    WBC 16.3 (H) 2020    WBC 18.8 (H) 2020    WBC 26.2 (H) 2020    HGB 7.4 (L) 2020    HCT 22.8 (L) 2020    MCV 84.8 2020     2020     Lab Results   Component Value Date    NEUTROABS 15.10 (H) 2020    NEUTROABS 16.70 (H) 2020    NEUTROABS 18.00 (H) 2020     No

## 2020-05-02 NOTE — PROGRESS NOTES
550mL yellow urine at 7am = 0.4ml/kg/hr (improved from 0.25 overnight)    Dressing now changed, incisions c/d/i, MELLY's serosang    Electronically signed by Parminder Soler MD on 5/2/2020 at 7:08 AM

## 2020-05-03 LAB
ANION GAP SERPL CALCULATED.3IONS-SCNC: 12 MMOL/L (ref 7–16)
ANION GAP SERPL CALCULATED.3IONS-SCNC: 8 MMOL/L (ref 7–16)
BUN BLDV-MCNC: 34 MG/DL (ref 8–23)
BUN BLDV-MCNC: 36 MG/DL (ref 8–23)
CALCIUM SERPL-MCNC: 8.2 MG/DL (ref 8.6–10.2)
CALCIUM SERPL-MCNC: 8.4 MG/DL (ref 8.6–10.2)
CHLORIDE BLD-SCNC: 108 MMOL/L (ref 98–107)
CHLORIDE BLD-SCNC: 109 MMOL/L (ref 98–107)
CO2: 17 MMOL/L (ref 22–29)
CO2: 18 MMOL/L (ref 22–29)
CREAT SERPL-MCNC: 2.8 MG/DL (ref 0.7–1.2)
CREAT SERPL-MCNC: 3.1 MG/DL (ref 0.7–1.2)
EOSINOPHIL, URINE: 0 % (ref 0–1)
FERRITIN: 535 NG/ML
FOLATE: 3.2 NG/ML (ref 4.8–24.2)
GFR AFRICAN AMERICAN: 25
GFR AFRICAN AMERICAN: 28
GFR NON-AFRICAN AMERICAN: 25 ML/MIN/1.73
GFR NON-AFRICAN AMERICAN: 28 ML/MIN/1.73
GLUCOSE BLD-MCNC: 89 MG/DL (ref 74–99)
GLUCOSE BLD-MCNC: 98 MG/DL (ref 74–99)
HCT VFR BLD CALC: 21.6 % (ref 37–54)
HCT VFR BLD CALC: 24.3 % (ref 37–54)
HEMOGLOBIN: 6.9 G/DL (ref 12.5–16.5)
HEMOGLOBIN: 8 G/DL (ref 12.5–16.5)
IRON SATURATION: 20 % (ref 20–55)
IRON: 22 MCG/DL (ref 59–158)
MCH RBC QN AUTO: 27.5 PG (ref 26–35)
MCH RBC QN AUTO: 27.9 PG (ref 26–35)
MCHC RBC AUTO-ENTMCNC: 31.9 % (ref 32–34.5)
MCHC RBC AUTO-ENTMCNC: 32.9 % (ref 32–34.5)
MCV RBC AUTO: 84.7 FL (ref 80–99.9)
MCV RBC AUTO: 86.1 FL (ref 80–99.9)
METER GLUCOSE: 110 MG/DL (ref 74–99)
METER GLUCOSE: 110 MG/DL (ref 74–99)
METER GLUCOSE: 94 MG/DL (ref 74–99)
METER GLUCOSE: 94 MG/DL (ref 74–99)
PARATHYROID HORMONE INTACT: 96 PG/ML (ref 15–65)
PDW BLD-RTO: 14.1 FL (ref 11.5–15)
PDW BLD-RTO: 14.5 FL (ref 11.5–15)
PHOSPHORUS: 2.8 MG/DL (ref 2.5–4.5)
PLATELET # BLD: 163 E9/L (ref 130–450)
PLATELET # BLD: 167 E9/L (ref 130–450)
PMV BLD AUTO: 11.1 FL (ref 7–12)
PMV BLD AUTO: 11.3 FL (ref 7–12)
POTASSIUM SERPL-SCNC: 4.4 MMOL/L (ref 3.5–5)
POTASSIUM SERPL-SCNC: 4.5 MMOL/L (ref 3.5–5)
RBC # BLD: 2.51 E12/L (ref 3.8–5.8)
RBC # BLD: 2.87 E12/L (ref 3.8–5.8)
SODIUM BLD-SCNC: 134 MMOL/L (ref 132–146)
SODIUM BLD-SCNC: 138 MMOL/L (ref 132–146)
TOTAL IRON BINDING CAPACITY: 108 MCG/DL (ref 250–450)
VITAMIN B-12: 942 PG/ML (ref 211–946)
WBC # BLD: 13.7 E9/L (ref 4.5–11.5)
WBC # BLD: 14.3 E9/L (ref 4.5–11.5)

## 2020-05-03 PROCEDURE — 82746 ASSAY OF FOLIC ACID SERUM: CPT

## 2020-05-03 PROCEDURE — 82728 ASSAY OF FERRITIN: CPT

## 2020-05-03 PROCEDURE — 36430 TRANSFUSION BLD/BLD COMPNT: CPT

## 2020-05-03 PROCEDURE — 94770 HC ETCO2 MONITOR DAILY: CPT

## 2020-05-03 PROCEDURE — 6370000000 HC RX 637 (ALT 250 FOR IP): Performed by: INTERNAL MEDICINE

## 2020-05-03 PROCEDURE — 80048 BASIC METABOLIC PNL TOTAL CA: CPT

## 2020-05-03 PROCEDURE — 6370000000 HC RX 637 (ALT 250 FOR IP): Performed by: SURGERY

## 2020-05-03 PROCEDURE — 94761 N-INVAS EAR/PLS OXIMETRY MLT: CPT

## 2020-05-03 PROCEDURE — 1200000000 HC SEMI PRIVATE

## 2020-05-03 PROCEDURE — 2580000003 HC RX 258: Performed by: NURSE PRACTITIONER

## 2020-05-03 PROCEDURE — 2700000000 HC OXYGEN THERAPY PER DAY

## 2020-05-03 PROCEDURE — 2580000003 HC RX 258: Performed by: SURGERY

## 2020-05-03 PROCEDURE — 6360000002 HC RX W HCPCS: Performed by: STUDENT IN AN ORGANIZED HEALTH CARE EDUCATION/TRAINING PROGRAM

## 2020-05-03 PROCEDURE — 6370000000 HC RX 637 (ALT 250 FOR IP): Performed by: STUDENT IN AN ORGANIZED HEALTH CARE EDUCATION/TRAINING PROGRAM

## 2020-05-03 PROCEDURE — 85027 COMPLETE CBC AUTOMATED: CPT

## 2020-05-03 PROCEDURE — 83970 ASSAY OF PARATHORMONE: CPT

## 2020-05-03 PROCEDURE — 83540 ASSAY OF IRON: CPT

## 2020-05-03 PROCEDURE — 84100 ASSAY OF PHOSPHORUS: CPT

## 2020-05-03 PROCEDURE — 2580000003 HC RX 258: Performed by: STUDENT IN AN ORGANIZED HEALTH CARE EDUCATION/TRAINING PROGRAM

## 2020-05-03 PROCEDURE — 82962 GLUCOSE BLOOD TEST: CPT

## 2020-05-03 PROCEDURE — 94660 CPAP INITIATION&MGMT: CPT

## 2020-05-03 PROCEDURE — 82607 VITAMIN B-12: CPT

## 2020-05-03 PROCEDURE — 36415 COLL VENOUS BLD VENIPUNCTURE: CPT

## 2020-05-03 PROCEDURE — 83550 IRON BINDING TEST: CPT

## 2020-05-03 PROCEDURE — 6360000002 HC RX W HCPCS: Performed by: NURSE PRACTITIONER

## 2020-05-03 RX ORDER — 0.9 % SODIUM CHLORIDE 0.9 %
250 INTRAVENOUS SOLUTION INTRAVENOUS ONCE
Status: COMPLETED | OUTPATIENT
Start: 2020-05-03 | End: 2020-05-03

## 2020-05-03 RX ORDER — DRONABINOL 2.5 MG/1
2.5 CAPSULE ORAL 2 TIMES DAILY
Status: DISCONTINUED | OUTPATIENT
Start: 2020-05-03 | End: 2020-05-04 | Stop reason: HOSPADM

## 2020-05-03 RX ADMIN — PREGABALIN 200 MG: 100 CAPSULE ORAL at 09:17

## 2020-05-03 RX ADMIN — PREGABALIN 200 MG: 100 CAPSULE ORAL at 16:39

## 2020-05-03 RX ADMIN — SODIUM CHLORIDE, PRESERVATIVE FREE 300 UNITS: 5 INJECTION INTRAVENOUS at 09:17

## 2020-05-03 RX ADMIN — Medication: at 11:32

## 2020-05-03 RX ADMIN — OXYCODONE 5 MG: 5 TABLET ORAL at 16:38

## 2020-05-03 RX ADMIN — INSULIN GLARGINE 15 UNITS: 100 INJECTION, SOLUTION SUBCUTANEOUS at 21:47

## 2020-05-03 RX ADMIN — SODIUM CHLORIDE: 9 INJECTION, SOLUTION INTRAVENOUS at 14:43

## 2020-05-03 RX ADMIN — SODIUM CHLORIDE, PRESERVATIVE FREE 300 UNITS: 5 INJECTION INTRAVENOUS at 21:47

## 2020-05-03 RX ADMIN — SODIUM CHLORIDE 100 ML: 9 INJECTION, SOLUTION INTRAVENOUS at 13:09

## 2020-05-03 RX ADMIN — OXYCODONE 5 MG: 5 TABLET ORAL at 23:11

## 2020-05-03 RX ADMIN — OXYCODONE 5 MG: 5 TABLET ORAL at 05:42

## 2020-05-03 RX ADMIN — DRONABINOL 2.5 MG: 2.5 CAPSULE ORAL at 21:46

## 2020-05-03 RX ADMIN — DOCUSATE SODIUM 100 MG: 100 CAPSULE, LIQUID FILLED ORAL at 21:46

## 2020-05-03 RX ADMIN — METOPROLOL TARTRATE 25 MG: 25 TABLET, FILM COATED ORAL at 21:46

## 2020-05-03 RX ADMIN — DULOXETINE HYDROCHLORIDE 120 MG: 60 CAPSULE, DELAYED RELEASE ORAL at 09:18

## 2020-05-03 RX ADMIN — ASPIRIN 81 MG 81 MG: 81 TABLET ORAL at 09:17

## 2020-05-03 RX ADMIN — METOPROLOL TARTRATE 25 MG: 25 TABLET, FILM COATED ORAL at 09:21

## 2020-05-03 RX ADMIN — CLONIDINE HYDROCHLORIDE 0.1 MG: 0.1 TABLET ORAL at 21:46

## 2020-05-03 RX ADMIN — CLONIDINE HYDROCHLORIDE 0.1 MG: 0.1 TABLET ORAL at 09:18

## 2020-05-03 RX ADMIN — BISACODYL 10 MG: 5 TABLET, COATED ORAL at 21:46

## 2020-05-03 RX ADMIN — LUBIPROSTONE 24 MCG: 24 CAPSULE, GELATIN COATED ORAL at 09:18

## 2020-05-03 RX ADMIN — CILOSTAZOL 100 MG: 100 TABLET ORAL at 21:46

## 2020-05-03 RX ADMIN — AMLODIPINE BESYLATE 5 MG: 5 TABLET ORAL at 09:18

## 2020-05-03 RX ADMIN — OXYCODONE 5 MG: 5 TABLET ORAL at 10:56

## 2020-05-03 RX ADMIN — SODIUM CHLORIDE, PRESERVATIVE FREE 10 ML: 5 INJECTION INTRAVENOUS at 16:50

## 2020-05-03 RX ADMIN — ATORVASTATIN CALCIUM 10 MG: 10 TABLET, FILM COATED ORAL at 21:46

## 2020-05-03 RX ADMIN — CEFEPIME HYDROCHLORIDE 1 G: 1 INJECTION, POWDER, FOR SOLUTION INTRAMUSCULAR; INTRAVENOUS at 05:41

## 2020-05-03 RX ADMIN — PREGABALIN 200 MG: 100 CAPSULE ORAL at 21:46

## 2020-05-03 RX ADMIN — CEFEPIME HYDROCHLORIDE 1 G: 1 INJECTION, POWDER, FOR SOLUTION INTRAMUSCULAR; INTRAVENOUS at 16:38

## 2020-05-03 RX ADMIN — STANDARDIZED SENNA CONCENTRATE 8.6 MG: 8.6 TABLET ORAL at 21:46

## 2020-05-03 ASSESSMENT — PAIN SCALES - GENERAL
PAINLEVEL_OUTOF10: 8
PAINLEVEL_OUTOF10: 8
PAINLEVEL_OUTOF10: 5
PAINLEVEL_OUTOF10: 7
PAINLEVEL_OUTOF10: 5
PAINLEVEL_OUTOF10: 5
PAINLEVEL_OUTOF10: 9
PAINLEVEL_OUTOF10: 5

## 2020-05-03 ASSESSMENT — PAIN DESCRIPTION - PAIN TYPE
TYPE: SURGICAL PAIN
TYPE: ACUTE PAIN;SURGICAL PAIN;PHANTOM PAIN
TYPE: SURGICAL PAIN

## 2020-05-03 ASSESSMENT — PAIN DESCRIPTION - ONSET
ONSET: ON-GOING

## 2020-05-03 ASSESSMENT — PAIN DESCRIPTION - FREQUENCY
FREQUENCY: CONTINUOUS

## 2020-05-03 ASSESSMENT — PAIN DESCRIPTION - LOCATION
LOCATION: LEG
LOCATION: BACK;LEG
LOCATION: LEG

## 2020-05-03 ASSESSMENT — PAIN DESCRIPTION - ORIENTATION
ORIENTATION: RIGHT

## 2020-05-03 ASSESSMENT — PAIN DESCRIPTION - DESCRIPTORS
DESCRIPTORS: ACHING
DESCRIPTORS: ACHING;DULL;DISCOMFORT
DESCRIPTORS: ACHING;BURNING;DISCOMFORT;CONSTANT
DESCRIPTORS: ACHING
DESCRIPTORS: ACHING

## 2020-05-03 ASSESSMENT — PAIN SCALES - WONG BAKER
WONGBAKER_NUMERICALRESPONSE: 0
WONGBAKER_NUMERICALRESPONSE: 0

## 2020-05-03 NOTE — PROGRESS NOTES
Vascular Surgery Progress Note    Pt is being seen in f/u today regarding R AKA  Subjective  No new overnight issues. Pain controlled on PCA, he doesn't feel ready to transition to prn's. UOP much improved last 24h (2025ml).  Cr 3.1     Current Medications:    sodium chloride      HYDROmorphone 0  (04/30/20 0735)    sodium chloride 150 mL/hr at 05/02/20 1617    dextrose        trimethobenzamide, sodium chloride flush, heparin flush, bisacodyl, naloxone, calcium carbonate, oxyCODONE **OR** oxyCODONE, labetalol, sodium chloride flush, tiZANidine, magnesium hydroxide, promethazine **OR** ondansetron, glucose, dextrose, glucagon (rDNA), dextrose    acetaminophen  650 mg Oral Q4H While awake    oxyCODONE  5 mg Oral Q6H    cefepime  1 g Intravenous Q12H    lactulose  30 g Oral TID    sodium chloride  20 mL Intravenous Once    sodium chloride  20 mL Intravenous Once    bacitracin zinc   Topical BID    insulin lispro  0-12 Units Subcutaneous TID WC    insulin lispro  0-6 Units Subcutaneous Nightly    lidocaine PF  5 mL Intradermal Once    heparin flush  3 mL Intravenous 2 times per day    sodium hypochlorite   Irrigation Once    bisacodyl  10 mg Oral BID    polyethylene glycol  17 g Oral Daily    sodium hypochlorite   Irrigation Daily    sodium chloride flush  10 mL Intravenous 2 times per day    amLODIPine  5 mg Oral Daily    aspirin  81 mg Oral Daily    atorvastatin  10 mg Oral Daily    cilostazol  100 mg Oral BID    cloNIDine  0.1 mg Oral BID    docusate sodium  100 mg Oral BID    DULoxetine  120 mg Oral QAM    pantoprazole  40 mg Oral QAM AC    ferrous sulfate  325 mg Oral BID WC    insulin glargine  15 Units Subcutaneous BID    lidocaine  1 patch Transdermal Daily    lubiprostone  24 mcg Oral BID WC    metoprolol  25 mg Oral BID    miconazole   Topical BID    pregabalin  200 mg Oral TID    senna  1 tablet Oral Daily    tamsulosin  0.4 mg Oral Daily    enoxaparin  40 mg

## 2020-05-03 NOTE — PROGRESS NOTES
Topical BID    pregabalin  200 mg Oral TID    senna  1 tablet Oral Daily    tamsulosin  0.4 mg Oral Daily    enoxaparin  40 mg Subcutaneous Daily     Continuous Infusions:   sodium chloride      HYDROmorphone 0  (20 0735)    sodium chloride 150 mL/hr at 20 1617    dextrose       PRN Meds:trimethobenzamide, sodium chloride flush, heparin flush, bisacodyl, naloxone, calcium carbonate, oxyCODONE **OR** oxyCODONE, labetalol, sodium chloride flush, tiZANidine, magnesium hydroxide, promethazine **OR** ondansetron, glucose, dextrose, glucagon (rDNA), dextrose    OBJECTIVE:  BP (!) 142/71   Pulse 86   Temp 98.9 °F (37.2 °C) (Temporal)   Resp 16   Ht 6' 2\" (1.88 m)   Wt (!) 335 lb (152 kg)   SpO2 98%   BMI 43.01 kg/m²   Temp  Av.6 °F (37 °C)  Min: 98.4 °F (36.9 °C)  Max: 98.9 °F (37.2 °C)  Constitutional: The patient is awake, alert, and oriented. Skin: Warm and dry. No rashes were noted. Pre op pictures              Postop  2020  picture after debridement        2020            HEENT: Round and reactive pupils. Moist mucous membranes. No ulcerations or thrush. Neck: Supple to movements. Chest: No use of accessory muscles to breathe. Symmetrical expansion. No wheezing, crackles or rhonchi. Cardiovascular: S1 and S2 are rhythmic and regular. No murmurs appreciated. Abdomen: Positive bowel sounds to auscultation. Benign to palpation. No masses felt. No hepatosplenomegaly. Genitourinary: Male  Extremities: No clubbing, no cyanosis, no edema.   See pictures above  Lines: peripheral  picc 20  Laboratory and Tests Review:  Lab Results   Component Value Date    WBC 14.3 (H) 2020    WBC 15.3 (H) 2020    WBC 16.3 (H) 2020    HGB 6.9 (L) 2020    HCT 21.6 (L) 2020    MCV 86.1 2020     2020     Lab Results   Component Value Date    NEUTROABS 15.10 (H) 2020    NEUTROABS 16.70 (H) 2020    NEUTROABS 18.00 (H) 04/24/2020     No results found for: CRPHS  Lab Results   Component Value Date    ALT 22 04/28/2020    AST 16 04/28/2020    ALKPHOS 80 04/28/2020    BILITOT 0.5 04/28/2020     Lab Results   Component Value Date     05/03/2020    K 4.5 05/03/2020    K 5.3 04/17/2020     05/03/2020    CO2 17 05/03/2020    BUN 36 05/03/2020    CREATININE 3.1 05/03/2020    CREATININE 3.1 05/02/2020    CREATININE 2.7 05/02/2020    GFRAA 25 05/03/2020    LABGLOM 25 05/03/2020    GLUCOSE 98 05/03/2020    PROT 6.4 04/28/2020    LABALBU 2.4 04/28/2020    CALCIUM 8.2 05/03/2020    BILITOT 0.5 04/28/2020    ALKPHOS 80 04/28/2020    AST 16 04/28/2020    ALT 22 04/28/2020     Lab Results   Component Value Date    CRP 10.6 (H) 10/24/2018    CRP 2.1 (H) 06/25/2018    CRP 7.4 (H) 06/11/2018     Lab Results   Component Value Date    SEDRATE 128 (H) 10/24/2018    SEDRATE 77 (H) 06/25/2018    SEDRATE 138 (H) 06/11/2018     Radiology:      Microbiology:   Lab Results   Component Value Date    BC 5 Days- no growth 04/16/2020    BC 5 Days- no growth 03/20/2020    BC 5 Days- no growth 10/26/2018    ORG Escherichia coli 04/21/2020    ORG Pseudomonas aeruginosa 04/21/2020    ORG Klebsiella oxytoca 04/21/2020     Lab Results   Component Value Date    BLOODCULT2 5 Days- no growth 04/16/2020    BLOODCULT2 5 Days- no growth 03/20/2020    BLOODCULT2 5 Days- no growth 10/26/2018    ORG Escherichia coli 04/21/2020    ORG Pseudomonas aeruginosa 04/21/2020    ORG Klebsiella oxytoca 04/21/2020     WOUND/ABSCESS   Date Value Ref Range Status   04/16/2020 Heavy growth  Final   04/16/2020 Heavy growth  Final   04/16/2020 Heavy growth  Final     No results found for: RESPSMEAR  No results found for: MPNEUMO, CLAMYDCU, LABLEGI, AFBCX, FUNGSM, LABFUNG  No results found for: CULTRESP  No results found for: CXCATHTIP  No results found for: BFCS  Culture Surgical   Date Value Ref Range Status   04/21/2020 Moderate growth  Final   04/21/2020 Moderate growth  Final   04/21/2020 Moderate growth  Final     Urine Culture, Routine   Date Value Ref Range Status   04/19/2020 Growth not present  Final   03/20/2020 <10,000 CFU/mL  Gram negative rods   (A)  Final   03/20/2020 <10,000 CFU/ml  Final   03/20/2020 <10,000 CFU/ml  Final     No results found for: [facility]    Microbiology:  Urine 4/19/2020-growth not present  Blood cultures 4/16/2020-NGTD  Right thigh surgical cultures 4/17/2020- Pseudomonas aeruginosa, E. Coli, Klebsiella oxytoca  Right thigh surgical cultures debridement 4/21/2020- Gram stain shows few gram-negative rods, tissue cultures are still pending    ASSESSMENT:  · Ischemic necrosis skin subcutaneous tissue and down fascia with cultures growing Pseudomonas aeruginosa, E. coli and Klebsiella oxytoca  · Right lower extremity (thigh) chronic stump wound, groin wound infection sp 3/20 R iliofemoral embolectomy, deep femoral embolectomy, 4/17, 4/21,4/23,4/28,4/30--Right thigh wound debridement   · Myocutaneous flap 04/30  · Leukocytosis 16 K--- postop leukocytosis-improving slowly       PLAN:  · Switch to  1  gram cefepime IV every 12---Day-17 of 6 weeks (adjusted for renal) (start date 04/17/2020; end date-05/29/2020)--Med reconciled--prescription on chart  · Monitor creatnine closely   · Reviewed cultures  · Monitor labs  · Orthopedics, vascular, plastic surgery following  · Nephrology following  · Plan for select at discharge      Michelle Schrader  9:33 AM  5/3/2020     Pt seen and examined. Above discussed agree with advanced practice nurse. Labs, cultures, and radiographs reviewed. Face to Face encounter occurred. Changes made as necessary.      Mishel Lopez MD

## 2020-05-03 NOTE — PROGRESS NOTES
[facility]  Nephrology Attending Progress Note  Shania DominguezClarisse Haile MD        SUBJECTIVE:     5/1: his is a 61 y. o. male with uncontrolled diabetes and hx of R AKA in 2018 (Dr Gian Orourke was able to ambulate afterwards but presented 3/20/20 with acute RLE ischemia and underwent the following operation with Dr Venu Stubbs:  Right groin exploration Right iliofemoral artery embolectomy with primary closure of arteriotomy Right deep femoral artery embolectomy Right lateral thigh diagnostic I&D (negative) for concern of NSTI on prior imaging Patient was discharged on POD#5 after above, and exam that day was: \"groin incision c/d/i, stump with bruising distally, no signs of infection, scrotum with penrose drain, surrounding tissue soft with no signs of cellulitis\".  Patient started having drainage from both incisions ~2 weeks ago, but because he was also having fevers, vomiting, and diarrhea, he was kept in quarantine and haf not followed up with Dr Venu Stubbs in clinic.      Over the past several years he has been on various antibiotics and had treatment to save his right leg and stump. He is post op right AKA with debridement done 4/30. The patient has been followed by our service in the past, but not recently. He states he has not followed with anyone regarding his kidneys for some time. In 2018 his baseline creatinine was noted to be 2.0 by Dr. Lazara Story. He states he has relocated to this area and has been at [facility] most recently. PTA he was on lisinopril, aldactone and  Keflex. During the past month he  has been treated with cefapime,  Dapto( last dose 4/23), Zosyn (last dose 4/16),  his lisinopril  And aldactone had been continues(will stop)  Last doses  of vancomycin appear to have been 4/17/2020. He has been started on ivf and given a bolus. 5/2:  pt seen in room.      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)    Coagulopathy (HCC)    Moderate protein-calorie malnutrition (HCC)    PVD (peripheral vascular disease) (HCC)    Leukocytosis    Stage 3 chronic kidney disease (HCC)    Tobacco dependence    HLD (hyperlipidemia)    History of DVT (deep vein thrombosis)    Osteomyelitis (HCC)    S/P AKA (above knee amputation), right (HCC)    History of vascular surgery    Occlusion of common femoral artery (HCC)    Cellulitis, scrotum    Hyperglycemia due to type 2 diabetes mellitus ([address]    Critical lower limb ischemia    Gas gangrene of thigh ([address]    Vascular occlusion    Wound infection    Postoperative wound infection    Ischemic ulcer of right thigh with fat layer exposed ([address]    Ischemic ulcer of right thigh with necrosis of muscle ([address]        PAST MEDICAL HISTORY:    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 ([address] 5/22/2018    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 ([address] 5/21/2018    Critical lower limb ischemia 3/20/2020    Diabetes mellitus ([address]     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 2 diabetes mellitus, with fat layer exposed ([address] 5/22/2018    DVT, lower extremity (HCC)     right leg     Gas gangrene of thigh ([address] 3/20/2020    Hyperlipidemia     Hypertension     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[address]     PVD (peripheral vascular disease) ([address]        DIET:    Dietary Nutrition Supplements: Diabetic Oral Supplement  Dietary Nutrition Supplements: Wound Healing Oral Supplement  DIET CARB CONTROL;     PHYSICAL EXAM:     Patient Vitals for the past 24 hrs:   BP Temp Temp src Pulse Resp SpO2   05/03/20 0720 (!) 142/71 98.9 °F (37.2 °C) Temporal 86 16 98 %   05/03/20 0338 (!) 129/56 98.4 °F (36.9 °C) Temporal 77 16 98 %   05/03/20 0007 -- -- -- Subcutaneous BID    lidocaine  1 patch Transdermal Daily    lubiprostone  24 mcg Oral BID WC    metoprolol  25 mg Oral BID    miconazole   Topical BID    pregabalin  200 mg Oral TID    senna  1 tablet Oral Daily    tamsulosin  0.4 mg Oral Daily    enoxaparin  40 mg Subcutaneous Daily       MEDS (infusions):   sodium chloride      HYDROmorphone 0  (04/30/20 0735)    sodium chloride 150 mL/hr at 05/02/20 1617    dextrose         MEDS (prn):  trimethobenzamide, sodium chloride flush, heparin flush, bisacodyl, naloxone, calcium carbonate, oxyCODONE **OR** oxyCODONE, labetalol, sodium chloride flush, tiZANidine, magnesium hydroxide, promethazine **OR** ondansetron, glucose, dextrose, glucagon (rDNA), dextrose    DATA:    Recent Labs     05/02/20  0500 05/02/20  1120 05/03/20  0535   WBC 16.3* 15.3* 14.3*   HGB 7.4* 7.2* 6.9*   HCT 22.8* 21.9* 21.6*   MCV 84.8 84.6 86.1    175 163     Recent Labs     05/02/20  0020 05/02/20  0500 05/03/20  0535    137 138   K 3.7 4.0 4.5   * 108* 109*   CO2 17* 19* 17*   BUN 33* 38* 36*   CREATININE 2.7* 3.1* 3.1*   LABGLOM 29 25 25   GLUCOSE 172* 157* 98   CALCIUM 6.9* 8.2* 8.2*   PHOS  --   --  2.8       Lab Results   Component Value Date    LABALBU 2.4 (L) 04/28/2020    LABALBU 2.2 (L) 04/24/2020    LABALBU 2.7 (L) 04/22/2020     Lab Results   Component Value Date    TSH 2.270 03/23/2020       Iron Studies  Lab Results   Component Value Date    IRON 22 (L) 05/03/2020    TIBC 108 (L) 05/03/2020    FERRITIN 535 05/03/2020     Vitamin B-12   Date Value Ref Range Status   05/03/2020 942 211 - 946 pg/mL Final     Folate   Date Value Ref Range Status   05/03/2020 3.2 (L) 4.8 - 24.2 ng/mL Final       Vit D, 25-Hydroxy   Date Value Ref Range Status   05/23/2018 24 (L) 30 - 100 ng/mL Final     Comment:     <20 ng/mL. ........... Marijean Fallow Deficient  20-30 ng/mL. ......... Marijean Fallow Insufficient   ng/mL. ........ Marijean Fallow Sufficient  >100 ng/mL. .......... Marijean Fallow Toxic       PTH   Date Value Ref

## 2020-05-04 VITALS
WEIGHT: 315 LBS | OXYGEN SATURATION: 100 % | HEART RATE: 81 BPM | RESPIRATION RATE: 18 BRPM | HEIGHT: 74 IN | TEMPERATURE: 98.2 F | DIASTOLIC BLOOD PRESSURE: 72 MMHG | SYSTOLIC BLOOD PRESSURE: 128 MMHG | BODY MASS INDEX: 40.43 KG/M2

## 2020-05-04 LAB
ANION GAP SERPL CALCULATED.3IONS-SCNC: 11 MMOL/L (ref 7–16)
BUN BLDV-MCNC: 33 MG/DL (ref 8–23)
CALCIUM SERPL-MCNC: 8.4 MG/DL (ref 8.6–10.2)
CHLORIDE BLD-SCNC: 112 MMOL/L (ref 98–107)
CO2: 17 MMOL/L (ref 22–29)
CREAT SERPL-MCNC: 2.8 MG/DL (ref 0.7–1.2)
GFR AFRICAN AMERICAN: 28
GFR NON-AFRICAN AMERICAN: 28 ML/MIN/1.73
GLUCOSE BLD-MCNC: 83 MG/DL (ref 74–99)
HCT VFR BLD CALC: 25.9 % (ref 37–54)
HEMOGLOBIN: 8.4 G/DL (ref 12.5–16.5)
MCH RBC QN AUTO: 27.5 PG (ref 26–35)
MCHC RBC AUTO-ENTMCNC: 32.4 % (ref 32–34.5)
MCV RBC AUTO: 84.9 FL (ref 80–99.9)
METER GLUCOSE: 105 MG/DL (ref 74–99)
METER GLUCOSE: 86 MG/DL (ref 74–99)
PDW BLD-RTO: 14.5 FL (ref 11.5–15)
PLATELET # BLD: 193 E9/L (ref 130–450)
PMV BLD AUTO: 11 FL (ref 7–12)
POTASSIUM SERPL-SCNC: 4.5 MMOL/L (ref 3.5–5)
PROCALCITONIN: 0.34 NG/ML (ref 0–0.08)
RBC # BLD: 3.05 E12/L (ref 3.8–5.8)
SODIUM BLD-SCNC: 140 MMOL/L (ref 132–146)
WBC # BLD: 13.8 E9/L (ref 4.5–11.5)

## 2020-05-04 PROCEDURE — 6360000002 HC RX W HCPCS: Performed by: STUDENT IN AN ORGANIZED HEALTH CARE EDUCATION/TRAINING PROGRAM

## 2020-05-04 PROCEDURE — 6370000000 HC RX 637 (ALT 250 FOR IP): Performed by: STUDENT IN AN ORGANIZED HEALTH CARE EDUCATION/TRAINING PROGRAM

## 2020-05-04 PROCEDURE — 2580000003 HC RX 258: Performed by: NURSE PRACTITIONER

## 2020-05-04 PROCEDURE — 6370000000 HC RX 637 (ALT 250 FOR IP): Performed by: SURGERY

## 2020-05-04 PROCEDURE — 94660 CPAP INITIATION&MGMT: CPT

## 2020-05-04 PROCEDURE — 6360000002 HC RX W HCPCS: Performed by: NURSE PRACTITIONER

## 2020-05-04 PROCEDURE — 85027 COMPLETE CBC AUTOMATED: CPT

## 2020-05-04 PROCEDURE — 2700000000 HC OXYGEN THERAPY PER DAY

## 2020-05-04 PROCEDURE — 82962 GLUCOSE BLOOD TEST: CPT

## 2020-05-04 PROCEDURE — 6360000002 HC RX W HCPCS: Performed by: INTERNAL MEDICINE

## 2020-05-04 PROCEDURE — 36415 COLL VENOUS BLD VENIPUNCTURE: CPT

## 2020-05-04 PROCEDURE — 84145 PROCALCITONIN (PCT): CPT

## 2020-05-04 PROCEDURE — 6370000000 HC RX 637 (ALT 250 FOR IP): Performed by: INTERNAL MEDICINE

## 2020-05-04 PROCEDURE — 80048 BASIC METABOLIC PNL TOTAL CA: CPT

## 2020-05-04 RX ORDER — CALCIUM CARBONATE 200(500)MG
500 TABLET,CHEWABLE ORAL 3 TIMES DAILY PRN
Qty: 90 TABLET | Refills: 0 | Status: SHIPPED | OUTPATIENT
Start: 2020-05-04 | End: 2020-06-03

## 2020-05-04 RX ORDER — POLYETHYLENE GLYCOL 3350 17 G/17G
17 POWDER, FOR SOLUTION ORAL DAILY
Qty: 527 G | Refills: 1 | Status: SHIPPED | OUTPATIENT
Start: 2020-05-04 | End: 2020-06-03

## 2020-05-04 RX ORDER — AMLODIPINE BESYLATE 5 MG/1
5 TABLET ORAL DAILY
Qty: 30 TABLET | Refills: 3 | Status: SHIPPED | OUTPATIENT
Start: 2020-05-05 | End: 2020-10-13

## 2020-05-04 RX ORDER — LACTULOSE 10 G/15ML
30 SOLUTION ORAL 3 TIMES DAILY
Qty: 1 BOTTLE | Refills: 1 | Status: ON HOLD
Start: 2020-05-04 | End: 2020-06-23 | Stop reason: HOSPADM

## 2020-05-04 RX ORDER — OXYCODONE HYDROCHLORIDE 5 MG/1
5 TABLET ORAL EVERY 4 HOURS PRN
Qty: 20 TABLET | Refills: 0 | Status: SHIPPED | OUTPATIENT
Start: 2020-05-04 | End: 2020-05-09

## 2020-05-04 RX ORDER — OXYCODONE HCL 10 MG/1
10 TABLET, FILM COATED, EXTENDED RELEASE ORAL EVERY 4 HOURS PRN
Qty: 60 EACH | Refills: 0 | Status: SHIPPED | OUTPATIENT
Start: 2020-05-04 | End: 2020-06-03

## 2020-05-04 RX ORDER — NALOXONE HYDROCHLORIDE 4 MG/.1ML
1 SPRAY NASAL PRN
Qty: 1 EACH | Refills: 5 | Status: SHIPPED
Start: 2020-05-04 | End: 2020-12-09

## 2020-05-04 RX ORDER — DRONABINOL 2.5 MG/1
2.5 CAPSULE ORAL 2 TIMES DAILY
Qty: 60 CAPSULE | Refills: 0 | Status: SHIPPED | OUTPATIENT
Start: 2020-05-04 | End: 2020-06-03

## 2020-05-04 RX ADMIN — ASPIRIN 81 MG 81 MG: 81 TABLET ORAL at 09:33

## 2020-05-04 RX ADMIN — SODIUM CHLORIDE: 9 INJECTION, SOLUTION INTRAVENOUS at 09:00

## 2020-05-04 RX ADMIN — CLONIDINE HYDROCHLORIDE 0.1 MG: 0.1 TABLET ORAL at 09:33

## 2020-05-04 RX ADMIN — BISACODYL 10 MG: 5 TABLET, COATED ORAL at 14:03

## 2020-05-04 RX ADMIN — DRONABINOL 2.5 MG: 2.5 CAPSULE ORAL at 09:33

## 2020-05-04 RX ADMIN — ALTEPLASE 2 MG: 2.2 INJECTION, POWDER, LYOPHILIZED, FOR SOLUTION INTRAVENOUS at 12:49

## 2020-05-04 RX ADMIN — ENOXAPARIN SODIUM 40 MG: 100 INJECTION SUBCUTANEOUS at 09:40

## 2020-05-04 RX ADMIN — ONDANSETRON HYDROCHLORIDE 4 MG: 2 SOLUTION INTRAMUSCULAR; INTRAVENOUS at 09:39

## 2020-05-04 RX ADMIN — FERROUS SULFATE TAB 325 MG (65 MG ELEMENTAL FE) 325 MG: 325 (65 FE) TAB at 14:03

## 2020-05-04 RX ADMIN — AMLODIPINE BESYLATE 5 MG: 5 TABLET ORAL at 09:33

## 2020-05-04 RX ADMIN — DULOXETINE HYDROCHLORIDE 120 MG: 60 CAPSULE, DELAYED RELEASE ORAL at 14:03

## 2020-05-04 RX ADMIN — CILOSTAZOL 100 MG: 100 TABLET ORAL at 09:33

## 2020-05-04 RX ADMIN — OXYCODONE 5 MG: 5 TABLET ORAL at 06:37

## 2020-05-04 RX ADMIN — CEFEPIME HYDROCHLORIDE 1 G: 1 INJECTION, POWDER, FOR SOLUTION INTRAMUSCULAR; INTRAVENOUS at 06:37

## 2020-05-04 RX ADMIN — DOCUSATE SODIUM 100 MG: 100 CAPSULE, LIQUID FILLED ORAL at 14:03

## 2020-05-04 ASSESSMENT — PAIN SCALES - WONG BAKER: WONGBAKER_NUMERICALRESPONSE: 0

## 2020-05-04 ASSESSMENT — PAIN SCALES - GENERAL
PAINLEVEL_OUTOF10: 4
PAINLEVEL_OUTOF10: 2
PAINLEVEL_OUTOF10: 8

## 2020-05-04 ASSESSMENT — PAIN DESCRIPTION - ONSET: ONSET: ON-GOING

## 2020-05-04 ASSESSMENT — PAIN DESCRIPTION - LOCATION: LOCATION: LEG

## 2020-05-04 ASSESSMENT — PAIN DESCRIPTION - FREQUENCY: FREQUENCY: CONTINUOUS

## 2020-05-04 ASSESSMENT — PAIN DESCRIPTION - PAIN TYPE: TYPE: SURGICAL PAIN

## 2020-05-04 ASSESSMENT — PAIN DESCRIPTION - ORIENTATION: ORIENTATION: RIGHT

## 2020-05-04 ASSESSMENT — PAIN DESCRIPTION - DESCRIPTORS: DESCRIPTORS: ACHING

## 2020-05-04 NOTE — PLAN OF CARE
11-7 RN REPORTED THAT PICC DOES NOT DRAW. THIS WRITER WAS UNABLE TO FLUSH PINK LUMEN AND COULD DRAW BLOOD FROM WHITE LUMEN. WILL PERFECT SERVE IV TEAM.

## 2020-05-04 NOTE — DISCHARGE SUMMARY
excisional debridement for infected right thigh stump. He was discharged thereafter to a nursing facility. He returns now secondary to ongoing fevers at the nursing facility. He was tested for COVID which was negative. He was placed in isolation there. Secondary to purulent drainage of his right stump and patient not improving clinically, he was sent into the emergency department for admission. On my evaluation, he does not give a very clear history however chart has been extensively reviewed. He indicates he is feeling much better today and denies any complaints.   /69   Pulse 71   Temp 97.6 °F (36.4 °C) (Temporal)   Resp 18   Ht 6' 2\" (1.88 m)   Wt (!) 335 lb (152 kg)   SpO2 97%   BMI 43.01 kg/m²   White count 13,000, H&H 9/29 surgical culture from 417 with gram-negative edis heavy growth    Admit to telemetry for evaluation of right thigh stump abscess-unhealed  S/p OR 4/17, 4/20, 4/21, 4/23, 4/28,  for further excisional debridements   4/30 Irrigation and excisional debridement of the Right thigh above knee amputation wound 25 x45 cm  Broad-spectrum IV antibiotic therapy - dose adjustments for renal fucntion-antibiotic finalization by infectious disease service will discharge to LTAC-cefepime and cephalexin    Resume home medications with appropriate dose adjustments  AC /at bedtime blood sugar checks with insulin sliding scale-adequately controlled  Adjust BP meds for accelerated hypertension-much better controlled  Adjust pain meds if needed-optimally controlled on IV Dilaudid pain pump  bowel regimen with zee lax, enema suppository prn, increase  Lactulose dose - constipated dt immobility and narcotics   Pain regimen- on PCA pump with IV Dilaudid-pain intermittent still   nutrient shakes   Consider other sources of nutrition as patient not tolerating p.o. intake       Recent Labs     05/03/20  0535 05/03/20  1835 05/04/20  0850   WBC 14.3* 13.7* 13.8*   HGB 6.9* 8.0* 8.4*   HCT 21.6* 24.3* 25.9*    167 193       Recent Labs     05/03/20  0535 05/03/20  1835 05/04/20  0850    134 140   K 4.5 4.4 4.5   * 108* 112*   CO2 17* 18* 17*   BUN 36* 34* 33*   CREATININE 3.1* 2.8* 2.8*   CALCIUM 8.2* 8.4* 8.4*       No results found. Discharge Exam:    HEENT: NCAT,  PERRLA, No JVD  Heart:  RRR, no murmurs, gallops, or rubs. Lungs:  CTA bilaterally, no wheeze, rales or rhonchi  Abd: bowel sounds present, nontender, nondistended, no masses  Extrem: Right stump in dressing    Disposition: LTAC [facility]    Patient Condition at Discharge: Stable    Patient Instructions:      Medication List      START taking these medications    calcium carbonate 500 MG chewable tablet  Commonly known as:  TUMS  Take 1 tablet by mouth 3 times daily as needed for Heartburn     cefepime  infusion  Commonly known as:  MAXIPIME  Infuse 1,000 mg intravenously every 24 hours for 26 days Compound per protocol     cephALEXin 500 MG capsule  Commonly known as:  KEFLEX  Take 1 capsule by mouth 4 times daily for 7 days     dronabinol 2.5 MG capsule  Commonly known as:  MARINOL  Take 1 capsule by mouth 2 times daily for 30 days. lactulose 10 GM/15ML solution  Commonly known as:  CHRONULAC  Take 45 mLs by mouth 3 times daily     naloxone 4 MG/0.1ML Liqd nasal spray  1 spray by Nasal route as needed for Opioid Reversal     ondansetron 4 MG tablet  Commonly known as:  Zofran  Take 1 tablet by mouth daily as needed for Nausea or Vomiting     polyethylene glycol 17 g packet  Commonly known as:  GLYCOLAX  Take 17 g by mouth daily        CHANGE how you take these medications    amLODIPine 5 MG tablet  Commonly known as:  NORVASC  Take 1 tablet by mouth daily  Start taking on: May 5, 2020  What changed:    · medication strength  · See the new instructions. * oxyCODONE 5 MG immediate release tablet  Commonly known as:  ROXICODONE  Take 1 tablet by mouth every 4 hours as needed for Pain for up to 5 days.   What changed: You were already taking a medication with the same name, and this prescription was added. Make sure you understand how and when to take each. * oxyCODONE 10 MG extended release tablet  Commonly known as:  OXYCONTIN  Take 1 tablet by mouth every 4 hours as needed for Pain for up to 30 days. What changed:  Another medication with the same name was added. Make sure you understand how and when to take each. * This list has 2 medication(s) that are the same as other medications prescribed for you. Read the directions carefully, and ask your doctor or other care provider to review them with you.             CONTINUE taking these medications    * acetaminophen 325 MG tablet  Commonly known as:  TYLENOL     * acetaminophen 500 MG tablet  Commonly known as:  TYLENOL     apixaban 5 MG Tabs tablet  Commonly known as:  Eliquis  Take 1 tablet by mouth 2 times daily     aspirin 81 MG chewable tablet  Take 1 tablet by mouth daily     atorvastatin 10 MG tablet  Commonly known as:  LIPITOR     bacitracin 500 UNIT/GM ointment     bisacodyl 10 MG suppository  Commonly known as:  DULCOLAX     cilostazol 100 MG tablet  Commonly known as:  PLETAL     cloNIDine 0.1 MG tablet  Commonly known as:  CATAPRES  TAKE 1 TABLET BY MOUTH 3 TIMES A DAY     docusate sodium 100 MG capsule  Commonly known as:  Colace  Take 1 capsule by mouth 2 times daily     DULoxetine 60 MG extended release capsule  Commonly known as:  CYMBALTA  TAKE 2 CAPSULES BY MOUTH EVERY MORNING     ferrous sulfate 325 (65 Fe) MG tablet  Commonly known as:  IRON 325  Take 1 tablet by mouth 2 times daily (with meals)     Handicap Placard Misc  by Does not apply route Patient cannot walk 200 ft without stopping to rest.    Expiration 10/21/2024     insulin lispro 100 UNIT/ML injection vial  Commonly known as:  HUMALOG  Inject 5 Units into the skin 3 times daily (with meals)     Insulin Syringe-Needle U-100 30G X 5/16\" 0.3 ML Misc  1 box by Does not apply route 5 times daily     Lancets Micro Thin 39U Misc  1 applicator by Does not apply route daily     Lantus 100 UNIT/ML injection vial  Generic drug:  insulin glargine  INJECT 15 UNITS INTO THE SKIN TWO TIMES A DAY     lisinopril 10 MG tablet  Commonly known as:  PRINIVIL;ZESTRIL  Take 1 tablet by mouth daily Hold if sbp<140     lubiprostone 24 MCG capsule  Commonly known as:  AMITIZA  Take 1 capsule by mouth 2 times daily (with meals)     metoprolol 100 MG tablet  Commonly known as:  LOPRESSOR  TAKE 1 TABLET BY MOUTH 2 TIMES A DAY     miconazole 2 % powder  Commonly known as:  MICOTIN  Apply topically 2 times daily. NexIUM 40 MG delayed release capsule  Generic drug:  esomeprazole     ONE TOUCH ULTRA TEST strip  Generic drug:  blood glucose test strips  USE TO TEST BLOOD SUGARS DAILY AS NEEDED     pregabalin 100 MG capsule  Commonly known as:  LYRICA     senna 8.6 MG Tabs tablet  Commonly known as:  SENOKOT  Take 1 tablet by mouth daily     spironolactone 50 MG tablet  Commonly known as:  ALDACTONE     tamsulosin 0.4 MG capsule  Commonly known as:  FLOMAX  Take 1 capsule by mouth daily     tiZANidine 4 MG tablet  Commonly known as:  ZANAFLEX     traZODone 50 MG tablet  Commonly known as:  DESYREL         * This list has 2 medication(s) that are the same as other medications prescribed for you. Read the directions carefully, and ask your doctor or other care provider to review them with you.             STOP taking these medications    promethazine 12.5 MG tablet  Commonly known as:  PHENERGAN           Where to Get Your Medications      These medications were sent to [facility] [address] [address]    Phone:  [phone]   · amLODIPine 5 MG tablet  · calcium carbonate 500 MG chewable tablet  · cephALEXin 500 MG capsule  · lactulose 10 GM/15ML solution  · naloxone 4 MG/0.1ML Liqd nasal spray  · ondansetron 4 MG tablet  · polyethylene glycol 17 g packet     You can get these medications from any pharmacy    Bring a paper prescription for each of these medications  · cefepime  infusion  · dronabinol 2.5 MG capsule  · oxyCODONE 10 MG extended release tablet  · oxyCODONE 5 MG immediate release tablet       Activity: activity as tolerated  Diet: diabetic diet    Pt has been advised to: Follow-up with Juanito Kline DO in 1 week.   Follow-up with consultants as recommended by them    Note that over 30 minutes was spent in preparing discharge papers, discussing discharge with patient, medication review, etc.    Signed:  Millie Hooks MD  5/4/2020  10:45 AM

## 2020-05-04 NOTE — PROGRESS NOTES
Date: 5/4/2020    Time: 12:37 AM    Patient Placed On BIPAP/CPAP/ Non-Invasive Ventilation? Patient continues on bipap    If no must comment. Facial area red/color change? No           If YES are Blister/Lesion present? No   If yes must notify nursing staff  BIPAP/CPAP skin barrier?   Yes    Skin barrier type:mepilex       Comments:        Telly Vyas, RRT

## 2020-05-04 NOTE — PROGRESS NOTES
Constitutional:  Pt is in no acute distress  Head: normocephalic, atraumatic  Neck: no JVD  Cardiovascular: regular rate and rhythm, no murmurs, gallops, or rubs  Respiratory:  No rales, rhochi, or wheezes  Gastrointestinal:  Soft, nontender, nondistended, bowel sounds x 4  Ext: r aka, no lle edema  Skin: dry, no rash    MEDS (scheduled):       MEDS (infusions):      MEDS (prn):      DATA:    Recent Labs     05/03/20 0535 05/03/20 1835 05/04/20  0850   WBC 14.3* 13.7* 13.8*   HGB 6.9* 8.0* 8.4*   HCT 21.6* 24.3* 25.9*   MCV 86.1 84.7 84.9    167 193     Recent Labs     05/03/20 0535 05/03/20 1835 05/04/20  0850    134 140   K 4.5 4.4 4.5   * 108* 112*   CO2 17* 18* 17*   BUN 36* 34* 33*   CREATININE 3.1* 2.8* 2.8*   LABGLOM 25 28 28   GLUCOSE 98 89 83   CALCIUM 8.2* 8.4* 8.4*   PHOS 2.8  --   --        Lab Results   Component Value Date    LABALBU 2.4 (L) 04/28/2020    LABALBU 2.2 (L) 04/24/2020    LABALBU 2.7 (L) 04/22/2020     Lab Results   Component Value Date    TSH 2.270 03/23/2020       Iron Studies  Lab Results   Component Value Date    IRON 22 (L) 05/03/2020    TIBC 108 (L) 05/03/2020    FERRITIN 535 05/03/2020     Vitamin B-12   Date Value Ref Range Status   05/03/2020 942 211 - 946 pg/mL Final     Folate   Date Value Ref Range Status   05/03/2020 3.2 (L) 4.8 - 24.2 ng/mL Final       Vit D, 25-Hydroxy   Date Value Ref Range Status   05/23/2018 24 (L) 30 - 100 ng/mL Final     Comment:     <20 ng/mL. ........... Kylah Francisco Deficient  20-30 ng/mL. ......... Kylah Francisco Insufficient   ng/mL. ........ Kylah Francisco Sufficient  >100 ng/mL. .......... Kylah Francisco Toxic       PTH   Date Value Ref Range Status   05/03/2020 96 (H) 15 - 65 pg/mL Final       No components found for: URIC    Lab Results   Component Value Date    COLORU Yellow 03/20/2020    NITRU Negative 03/20/2020    GLUCOSEU >=1000 03/20/2020    KETUA Negative 03/20/2020    UROBILINOGEN 0.2 03/20/2020    BILIRUBINUR Negative 03/20/2020       No results found for: LIPIDPAN      IMPRESSION/RECOMMENDATIONS:      1. Acute kidney injury  Post op with poor po intake  Significant wound drainage and blood loss in or  Stop lisinopril and aldactone for the present time  Urine eos neg, on cefepoime  Cr plateau at 3.1  Good uo  For prbc today       2. Chronic kidney disease stage III  Baseline 2.1 from 2018  Recent creatinine has been 1.4-1.8  DM/HTN     3. Hypertension with CKD I-IV   Follow off ACEI and aldactone for now.      4. Right AKA  S/p debridement   recurrent  Antibiotics per ID, cefepime     5. Anemia  Post op and managed per surgery  Check iron b12 fol  For prbc again today    OK fro transfer to [address] [place] [address] Drive.  Ricky Dia MD

## 2020-05-04 NOTE — PROGRESS NOTES
Vascular Surgery Progress Note    Pt is being seen in f/u today regarding R AKA  Subjective  No new overnight issues. Pain controlled on PCA, still hesitant to transition to prns  UOP dropped again 1150 (0.3).  Awaiting am Cr    Current Medications:    sodium chloride      HYDROmorphone 0  (04/30/20 0735)    sodium chloride 150 mL/hr at 05/03/20 1443    dextrose        trimethobenzamide, sodium chloride flush, heparin flush, bisacodyl, naloxone, calcium carbonate, oxyCODONE **OR** oxyCODONE, labetalol, sodium chloride flush, tiZANidine, magnesium hydroxide, promethazine **OR** ondansetron, glucose, dextrose, glucagon (rDNA), dextrose    dronabinol  2.5 mg Oral BID    acetaminophen  650 mg Oral Q4H While awake    oxyCODONE  5 mg Oral Q6H    cefepime  1 g Intravenous Q12H    lactulose  30 g Oral TID    sodium chloride  20 mL Intravenous Once    sodium chloride  20 mL Intravenous Once    bacitracin zinc   Topical BID    insulin lispro  0-12 Units Subcutaneous TID WC    insulin lispro  0-6 Units Subcutaneous Nightly    lidocaine PF  5 mL Intradermal Once    heparin flush  3 mL Intravenous 2 times per day    sodium hypochlorite   Irrigation Once    bisacodyl  10 mg Oral BID    polyethylene glycol  17 g Oral Daily    sodium hypochlorite   Irrigation Daily    sodium chloride flush  10 mL Intravenous 2 times per day    amLODIPine  5 mg Oral Daily    aspirin  81 mg Oral Daily    atorvastatin  10 mg Oral Daily    cilostazol  100 mg Oral BID    cloNIDine  0.1 mg Oral BID    docusate sodium  100 mg Oral BID    DULoxetine  120 mg Oral QAM    pantoprazole  40 mg Oral QAM AC    ferrous sulfate  325 mg Oral BID WC    insulin glargine  15 Units Subcutaneous BID    lidocaine  1 patch Transdermal Daily    lubiprostone  24 mcg Oral BID WC    metoprolol  25 mg Oral BID    miconazole   Topical BID    pregabalin  200 mg Oral TID    senna  1 tablet Oral Daily    tamsulosin  0.4 mg Oral Daily    enoxaparin  40 mg Subcutaneous Daily      PHYSICAL EXAM:    BP (!) 165/80   Pulse 79   Temp 98.7 °F (37.1 °C) (Temporal)   Resp 16   Ht 6' 2\" (1.88 m)   Wt (!) 335 lb (152 kg)   SpO2 100%   BMI 43.01 kg/m²     Intake/Output Summary (Last 24 hours) at 5/4/2020 0700  Last data filed at 5/4/2020 0657  Gross per 24 hour   Intake 2697.33 ml   Output 1280 ml   Net 1417.33 ml        GENERAL:  No acute distress. Alert and conversational.   LUNGS:  No cough. Nonlabored breathing on bipap. CARDIOVASC:  Normal rate, regular rhythm. ABDOMEN:  Soft, non-distended, non-tender. LIMBS:  RLE AKA c/i with no dehiscence/erythema/purulence. Soft and warm, swelling as expected, no appreciable hematoma. Some serosanguinous drainage from around MELLY drains. MELLY drains x2, serosanguinous, output 130 (prior day 125)  NEURO:  Face symmetric, moves all extremities (except R AKA)  SKIN:  Warm, dry. LABS:    Lab Results   Component Value Date    WBC 13.7 (H) 05/03/2020    HGB 8.0 (L) 05/03/2020    HCT 24.3 (L) 05/03/2020     05/03/2020    PROTIME 14.7 (H) 04/27/2020    INR 1.3 04/27/2020    APTT 31.6 04/16/2020    K 4.4 05/03/2020    BUN 34 (H) 05/03/2020    CREATININE 2.8 (H) 05/03/2020     A/P R AKA s/p debridement, and closure  · Acute postop anemia - transfusing prn,hgb stable, 6.8 5/3 transfused 1 unit responded appropriately to 8, awaiting am labs. · COLE on CKD III - improving UOP, Cr still increased @ 3.1 5/3, improved to 2.8 after transfusion. Continue strict I/O. Appreciate nephrology assistance. Awaiting AM labs.   · Pain controlled on pca, on scheduled tylenol and oxy to start transitioning, he is POD 6/11/13/14/17 following multiple repeat debridements and eventual closure  · ID on board for abx, WBC much improved  · [employer] following for prosthetic options  · Daily dressing changes    Electronically signed by Lian Garcia MD on 5/4/2020 at 7:00 AM

## 2020-05-04 NOTE — PROGRESS NOTES
 tamsulosin  0.4 mg Oral Daily    enoxaparin  40 mg Subcutaneous Daily     Continuous Infusions:   sodium chloride      HYDROmorphone 0  (20 0735)    sodium chloride 150 mL/hr at 20 1443    dextrose       PRN Meds:trimethobenzamide, sodium chloride flush, heparin flush, bisacodyl, naloxone, calcium carbonate, oxyCODONE **OR** oxyCODONE, labetalol, sodium chloride flush, tiZANidine, magnesium hydroxide, promethazine **OR** ondansetron, glucose, dextrose, glucagon (rDNA), dextrose    OBJECTIVE:  BP (!) 165/80   Pulse 79   Temp 98.7 °F (37.1 °C) (Temporal)   Resp 16   Ht 6' 2\" (1.88 m)   Wt (!) 335 lb (152 kg)   SpO2 100%   BMI 43.01 kg/m²   Temp  Av.1 °F (36.7 °C)  Min: 97.8 °F (36.6 °C)  Max: 98.7 °F (37.1 °C)  Constitutional: The patient is awake, alert, and oriented. Skin: Warm and dry. No rashes were noted. Pre op pictures              Postop  2020  picture after debridement        2020            HEENT: Round and reactive pupils. Moist mucous membranes. No ulcerations or thrush. Neck: Supple to movements. Chest: No use of accessory muscles to breathe. Symmetrical expansion. No wheezing, crackles or rhonchi. Cardiovascular: S1 and S2 are rhythmic and regular. No murmurs appreciated. Abdomen: Positive bowel sounds to auscultation. Benign to palpation. No masses felt. No hepatosplenomegaly. Genitourinary: Male  Extremities: No clubbing, no cyanosis, no edema.   See pictures above  Lines: peripheral  picc 20  Laboratory and Tests Review:  Lab Results   Component Value Date    WBC 13.8 (H) 2020    WBC 13.7 (H) 2020    WBC 14.3 (H) 2020    HGB 8.4 (L) 2020    HCT 25.9 (L) 2020    MCV 84.9 2020     2020     Lab Results   Component Value Date    NEUTROABS 15.10 (H) 2020    NEUTROABS 16.70 (H) 2020    NEUTROABS 18.00 (H) 2020     No results found for: [facility]  Lab Results

## 2020-05-04 NOTE — PROGRESS NOTES
Date: 5/3/2020    Time: 10:19 PM    Patient Placed On BIPAP/CPAP/ Non-Invasive Ventilation? Yes    If no must comment. Facial area red/color change? No           If YES are Blister/Lesion present? No   If yes must notify nursing staff  BIPAP/CPAP skin barrier?   Yes    Skin barrier type:mepilex       Comments:        Jerrica Darling

## 2020-05-05 LAB
BLOOD BANK DISPENSE STATUS: NORMAL
BLOOD BANK PRODUCT CODE: NORMAL
BPU ID: NORMAL
DESCRIPTION BLOOD BANK: NORMAL

## 2020-05-05 PROCEDURE — 99024 POSTOP FOLLOW-UP VISIT: CPT | Performed by: PHYSICIAN ASSISTANT

## 2020-05-15 RX ORDER — HYDROCODONE BITARTRATE AND ACETAMINOPHEN 5; 325 MG/1; MG/1
1 TABLET ORAL EVERY 4 HOURS PRN
Qty: 120 TABLET | Refills: 0 | Status: ON HOLD
Start: 2020-05-15 | End: 2020-06-11 | Stop reason: SDUPTHER

## 2020-05-15 RX ORDER — OXYCODONE 13.5 MG/1
13.5 CAPSULE, EXTENDED RELEASE ORAL 2 TIMES DAILY
Qty: 60 EACH | Refills: 0 | Status: ON HOLD
Start: 2020-05-15 | End: 2020-06-11 | Stop reason: SDUPTHER

## 2020-06-04 ENCOUNTER — APPOINTMENT (OUTPATIENT)
Dept: GENERAL RADIOLOGY | Age: 65
DRG: 560 | End: 2020-06-04
Attending: PHYSICAL MEDICINE & REHABILITATION
Payer: COMMERCIAL

## 2020-06-04 ENCOUNTER — HOSPITAL ENCOUNTER (INPATIENT)
Age: 65
LOS: 19 days | Discharge: HOME HEALTH | DRG: 560 | End: 2020-06-23
Attending: PHYSICAL MEDICINE & REHABILITATION | Admitting: PHYSICAL MEDICINE & REHABILITATION
Payer: COMMERCIAL

## 2020-06-04 PROBLEM — S78.111A ABOVE KNEE AMPUTATION OF RIGHT LOWER EXTREMITY (HCC): Status: ACTIVE | Noted: 2020-06-04

## 2020-06-04 LAB — METER GLUCOSE: 127 MG/DL (ref 74–99)

## 2020-06-04 PROCEDURE — 82962 GLUCOSE BLOOD TEST: CPT

## 2020-06-04 PROCEDURE — 71045 X-RAY EXAM CHEST 1 VIEW: CPT

## 2020-06-04 PROCEDURE — 2500000003 HC RX 250 WO HCPCS: Performed by: PHYSICAL MEDICINE & REHABILITATION

## 2020-06-04 PROCEDURE — 1280000000 HC REHAB R&B

## 2020-06-04 PROCEDURE — 6360000002 HC RX W HCPCS: Performed by: PHYSICAL MEDICINE & REHABILITATION

## 2020-06-04 PROCEDURE — 6370000000 HC RX 637 (ALT 250 FOR IP): Performed by: PHYSICAL MEDICINE & REHABILITATION

## 2020-06-04 RX ORDER — ACETAMINOPHEN 325 MG/1
650 TABLET ORAL EVERY 6 HOURS PRN
Status: DISCONTINUED | OUTPATIENT
Start: 2020-06-04 | End: 2020-06-23 | Stop reason: HOSPADM

## 2020-06-04 RX ORDER — DULOXETIN HYDROCHLORIDE 60 MG/1
120 CAPSULE, DELAYED RELEASE ORAL DAILY
Status: DISCONTINUED | OUTPATIENT
Start: 2020-06-04 | End: 2020-06-23 | Stop reason: HOSPADM

## 2020-06-04 RX ORDER — ATORVASTATIN CALCIUM 10 MG/1
10 TABLET, FILM COATED ORAL DAILY
Status: DISCONTINUED | OUTPATIENT
Start: 2020-06-05 | End: 2020-06-23 | Stop reason: HOSPADM

## 2020-06-04 RX ORDER — HEPARIN SODIUM (PORCINE) LOCK FLUSH IV SOLN 100 UNIT/ML 100 UNIT/ML
300 SOLUTION INTRAVENOUS 2 TIMES DAILY
Status: DISCONTINUED | OUTPATIENT
Start: 2020-06-04 | End: 2020-06-23 | Stop reason: HOSPADM

## 2020-06-04 RX ORDER — OXYCODONE HYDROCHLORIDE 5 MG/1
5 TABLET ORAL EVERY 6 HOURS PRN
Status: DISCONTINUED | OUTPATIENT
Start: 2020-06-04 | End: 2020-06-07

## 2020-06-04 RX ORDER — BACITRACIN, NEOMYCIN, POLYMYXIN B 400; 3.5; 5 [USP'U]/G; MG/G; [USP'U]/G
OINTMENT TOPICAL 2 TIMES DAILY
Status: DISCONTINUED | OUTPATIENT
Start: 2020-06-04 | End: 2020-06-06

## 2020-06-04 RX ORDER — POLYETHYLENE GLYCOL 3350 17 G/17G
17 POWDER, FOR SOLUTION ORAL DAILY PRN
Status: DISCONTINUED | OUTPATIENT
Start: 2020-06-04 | End: 2020-06-23 | Stop reason: HOSPADM

## 2020-06-04 RX ORDER — CLONIDINE HYDROCHLORIDE 0.1 MG/1
0.1 TABLET ORAL 2 TIMES DAILY
Status: DISCONTINUED | OUTPATIENT
Start: 2020-06-04 | End: 2020-06-22

## 2020-06-04 RX ORDER — AMLODIPINE BESYLATE 5 MG/1
5 TABLET ORAL DAILY
Status: DISCONTINUED | OUTPATIENT
Start: 2020-06-05 | End: 2020-06-23 | Stop reason: HOSPADM

## 2020-06-04 RX ORDER — NICOTINE POLACRILEX 4 MG
15 LOZENGE BUCCAL
Status: ACTIVE | OUTPATIENT
Start: 2020-06-04 | End: 2020-06-04

## 2020-06-04 RX ORDER — CILOSTAZOL 50 MG/1
50 TABLET ORAL 2 TIMES DAILY
Status: DISCONTINUED | OUTPATIENT
Start: 2020-06-04 | End: 2020-06-23 | Stop reason: HOSPADM

## 2020-06-04 RX ORDER — SODIUM CHLORIDE 0.9 % (FLUSH) 0.9 %
10 SYRINGE (ML) INJECTION EVERY 12 HOURS SCHEDULED
Status: DISCONTINUED | OUTPATIENT
Start: 2020-06-04 | End: 2020-06-23 | Stop reason: HOSPADM

## 2020-06-04 RX ORDER — ACETAMINOPHEN 325 MG/1
650 TABLET ORAL EVERY 4 HOURS PRN
Status: DISCONTINUED | OUTPATIENT
Start: 2020-06-04 | End: 2020-06-23 | Stop reason: HOSPADM

## 2020-06-04 RX ADMIN — ENOXAPARIN SODIUM 40 MG: 40 INJECTION SUBCUTANEOUS at 22:30

## 2020-06-04 RX ADMIN — CILOSTAZOL 50 MG: 50 TABLET ORAL at 22:31

## 2020-06-04 RX ADMIN — CLONIDINE HYDROCHLORIDE 0.1 MG: 0.1 TABLET ORAL at 22:34

## 2020-06-04 RX ADMIN — DULOXETINE HYDROCHLORIDE 120 MG: 60 CAPSULE, DELAYED RELEASE ORAL at 22:31

## 2020-06-04 RX ADMIN — OXYCODONE 5 MG: 5 TABLET ORAL at 22:31

## 2020-06-04 RX ADMIN — BISACODYL 5 MG: 5 TABLET, COATED ORAL at 22:37

## 2020-06-04 RX ADMIN — MICONAZOLE NITRATE: 20.6 POWDER TOPICAL at 22:30

## 2020-06-04 RX ADMIN — METOPROLOL TARTRATE 25 MG: 25 TABLET, FILM COATED ORAL at 22:32

## 2020-06-04 ASSESSMENT — PAIN SCALES - GENERAL: PAINLEVEL_OUTOF10: 8

## 2020-06-04 ASSESSMENT — PAIN DESCRIPTION - LOCATION: LOCATION: LEG

## 2020-06-04 ASSESSMENT — PAIN DESCRIPTION - DESCRIPTORS: DESCRIPTORS: ACHING

## 2020-06-04 ASSESSMENT — PAIN DESCRIPTION - FREQUENCY: FREQUENCY: CONTINUOUS

## 2020-06-04 ASSESSMENT — PAIN DESCRIPTION - ONSET: ONSET: ON-GOING

## 2020-06-04 ASSESSMENT — PAIN DESCRIPTION - PROGRESSION: CLINICAL_PROGRESSION: NOT CHANGED

## 2020-06-04 ASSESSMENT — PAIN DESCRIPTION - PAIN TYPE: TYPE: PHANTOM PAIN

## 2020-06-04 ASSESSMENT — PAIN DESCRIPTION - ORIENTATION: ORIENTATION: RIGHT

## 2020-06-04 NOTE — LETTER
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)    Coagulopathy (HCC)    Moderate protein-calorie malnutrition (HCC)    PVD (peripheral vascular disease) (HCC)    Leukocytosis    Stage 3 chronic kidney disease (HCC)    Tobacco dependence    HLD (hyperlipidemia)    History of DVT (deep vein thrombosis)    Osteomyelitis (HCC)    S/P AKA (above knee amputation), right (HCC)    History of vascular surgery    Occlusion of common femoral artery (HCC)    Cellulitis, scrotum    Hyperglycemia due to type 2 diabetes mellitus ([address]    Critical lower limb ischemia    Gas gangrene of thigh ([address]    Vascular occlusion    Wound infection    Postoperative wound infection    Ischemic ulcer of right thigh with fat layer exposed ([address]    Ischemic ulcer of right thigh with necrosis of muscle ([address]    Above knee amputation of right lower extremity ([address]    Postoperative pain    Encounter for dental examination and cleaning with abnormal findings    Chronic embolism and thrombosis of unspecified tibial vein ([address]    Acute kidney injury ([address]       INSURANCE INFORMATION:  Payor: [employer] /  /  /     ADVANCED DIRECTIVES:   Advance Directives (For Healthcare)  Pre-existing DNR Comfort Care/DNR Arrest/DNI Order: No  Healthcare Directive: No, patient does not have an advance directive for healthcare treatment  Information on Healthcare Directives Requested: No  Patient Requests Assistance: No  Advance Directives: Pt. not interested at this time  [unfilled]     EMERGENCY CONTACT:       RISK FACTORS:   Social History     Tobacco Use    Smoking status: Current Every Day Smoker     Packs/day: 0.50     Years: 7.00     Pack years: 3.50

## 2020-06-05 LAB
ANION GAP SERPL CALCULATED.3IONS-SCNC: 9 MMOL/L (ref 7–16)
BASOPHILS ABSOLUTE: 0.04 E9/L (ref 0–0.2)
BASOPHILS RELATIVE PERCENT: 0.4 % (ref 0–2)
BUN BLDV-MCNC: 36 MG/DL (ref 8–23)
CALCIUM SERPL-MCNC: 9.1 MG/DL (ref 8.6–10.2)
CHLORIDE BLD-SCNC: 100 MMOL/L (ref 98–107)
CO2: 25 MMOL/L (ref 22–29)
CREAT SERPL-MCNC: 2.4 MG/DL (ref 0.7–1.2)
EOSINOPHILS ABSOLUTE: 0.17 E9/L (ref 0.05–0.5)
EOSINOPHILS RELATIVE PERCENT: 1.5 % (ref 0–6)
GFR AFRICAN AMERICAN: 33
GFR NON-AFRICAN AMERICAN: 33 ML/MIN/1.73
GLUCOSE BLD-MCNC: 126 MG/DL (ref 74–99)
HCT VFR BLD CALC: 25.7 % (ref 37–54)
HEMOGLOBIN: 8 G/DL (ref 12.5–16.5)
IMMATURE GRANULOCYTES #: 0.05 E9/L
IMMATURE GRANULOCYTES %: 0.4 % (ref 0–5)
LYMPHOCYTES ABSOLUTE: 2.07 E9/L (ref 1.5–4)
LYMPHOCYTES RELATIVE PERCENT: 18.3 % (ref 20–42)
MCH RBC QN AUTO: 26.6 PG (ref 26–35)
MCHC RBC AUTO-ENTMCNC: 31.1 % (ref 32–34.5)
MCV RBC AUTO: 85.4 FL (ref 80–99.9)
METER GLUCOSE: 106 MG/DL (ref 74–99)
METER GLUCOSE: 117 MG/DL (ref 74–99)
METER GLUCOSE: 161 MG/DL (ref 74–99)
METER GLUCOSE: 167 MG/DL (ref 74–99)
MONOCYTES ABSOLUTE: 0.95 E9/L (ref 0.1–0.95)
MONOCYTES RELATIVE PERCENT: 8.4 % (ref 2–12)
NEUTROPHILS ABSOLUTE: 8.04 E9/L (ref 1.8–7.3)
NEUTROPHILS RELATIVE PERCENT: 71 % (ref 43–80)
PDW BLD-RTO: 13.8 FL (ref 11.5–15)
PLATELET # BLD: 165 E9/L (ref 130–450)
PMV BLD AUTO: 12 FL (ref 7–12)
POTASSIUM REFLEX MAGNESIUM: 5 MMOL/L (ref 3.5–5)
RBC # BLD: 3.01 E12/L (ref 3.8–5.8)
SODIUM BLD-SCNC: 134 MMOL/L (ref 132–146)
WBC # BLD: 11.3 E9/L (ref 4.5–11.5)

## 2020-06-05 PROCEDURE — 85025 COMPLETE CBC W/AUTO DIFF WBC: CPT

## 2020-06-05 PROCEDURE — 1280000000 HC REHAB R&B

## 2020-06-05 PROCEDURE — 97166 OT EVAL MOD COMPLEX 45 MIN: CPT

## 2020-06-05 PROCEDURE — 6370000000 HC RX 637 (ALT 250 FOR IP): Performed by: PHYSICAL MEDICINE & REHABILITATION

## 2020-06-05 PROCEDURE — 97530 THERAPEUTIC ACTIVITIES: CPT

## 2020-06-05 PROCEDURE — 36415 COLL VENOUS BLD VENIPUNCTURE: CPT

## 2020-06-05 PROCEDURE — 97535 SELF CARE MNGMENT TRAINING: CPT

## 2020-06-05 PROCEDURE — 97162 PT EVAL MOD COMPLEX 30 MIN: CPT

## 2020-06-05 PROCEDURE — 80048 BASIC METABOLIC PNL TOTAL CA: CPT

## 2020-06-05 PROCEDURE — 82962 GLUCOSE BLOOD TEST: CPT

## 2020-06-05 PROCEDURE — 2580000003 HC RX 258: Performed by: PHYSICAL MEDICINE & REHABILITATION

## 2020-06-05 PROCEDURE — 97110 THERAPEUTIC EXERCISES: CPT

## 2020-06-05 PROCEDURE — 6360000002 HC RX W HCPCS: Performed by: PHYSICAL MEDICINE & REHABILITATION

## 2020-06-05 RX ORDER — DEXTROSE MONOHYDRATE 25 G/50ML
12.5 INJECTION, SOLUTION INTRAVENOUS PRN
Status: DISCONTINUED | OUTPATIENT
Start: 2020-06-05 | End: 2020-06-23 | Stop reason: HOSPADM

## 2020-06-05 RX ORDER — DEXTROSE MONOHYDRATE 50 MG/ML
100 INJECTION, SOLUTION INTRAVENOUS PRN
Status: DISCONTINUED | OUTPATIENT
Start: 2020-06-05 | End: 2020-06-23 | Stop reason: HOSPADM

## 2020-06-05 RX ORDER — NICOTINE POLACRILEX 4 MG
15 LOZENGE BUCCAL PRN
Status: DISCONTINUED | OUTPATIENT
Start: 2020-06-05 | End: 2020-06-23 | Stop reason: HOSPADM

## 2020-06-05 RX ADMIN — INSULIN LISPRO 2 UNITS: 100 INJECTION, SOLUTION INTRAVENOUS; SUBCUTANEOUS at 11:17

## 2020-06-05 RX ADMIN — METOPROLOL TARTRATE 25 MG: 25 TABLET, FILM COATED ORAL at 11:14

## 2020-06-05 RX ADMIN — BISACODYL 5 MG: 5 TABLET, COATED ORAL at 21:46

## 2020-06-05 RX ADMIN — AMLODIPINE BESYLATE 5 MG: 5 TABLET ORAL at 08:59

## 2020-06-05 RX ADMIN — BACITRACIN ZINC, NEOMYCIN SULFATE, POLYMYXIN B SULFATE: 3.5; 5000; 4 OINTMENT TOPICAL at 12:30

## 2020-06-05 RX ADMIN — SODIUM CHLORIDE, PRESERVATIVE FREE 300 UNITS: 5 INJECTION INTRAVENOUS at 21:23

## 2020-06-05 RX ADMIN — METOPROLOL TARTRATE 25 MG: 25 TABLET, FILM COATED ORAL at 21:46

## 2020-06-05 RX ADMIN — ENOXAPARIN SODIUM 40 MG: 40 INJECTION SUBCUTANEOUS at 11:14

## 2020-06-05 RX ADMIN — ENOXAPARIN SODIUM 40 MG: 40 INJECTION SUBCUTANEOUS at 21:45

## 2020-06-05 RX ADMIN — MICONAZOLE NITRATE: 20.6 POWDER TOPICAL at 12:30

## 2020-06-05 RX ADMIN — BISACODYL 5 MG: 5 TABLET, COATED ORAL at 11:14

## 2020-06-05 RX ADMIN — OXYCODONE 5 MG: 5 TABLET ORAL at 16:33

## 2020-06-05 RX ADMIN — CILOSTAZOL 50 MG: 50 TABLET ORAL at 21:46

## 2020-06-05 RX ADMIN — SODIUM CHLORIDE, PRESERVATIVE FREE 300 UNITS: 5 INJECTION INTRAVENOUS at 00:04

## 2020-06-05 RX ADMIN — BACITRACIN ZINC, NEOMYCIN SULFATE, POLYMYXIN B SULFATE: 3.5; 5000; 4 OINTMENT TOPICAL at 21:51

## 2020-06-05 RX ADMIN — ATORVASTATIN CALCIUM 10 MG: 10 TABLET, FILM COATED ORAL at 08:59

## 2020-06-05 RX ADMIN — Medication 10 ML: at 09:20

## 2020-06-05 RX ADMIN — OXYCODONE 5 MG: 5 TABLET ORAL at 09:02

## 2020-06-05 RX ADMIN — CLONIDINE HYDROCHLORIDE 0.1 MG: 0.1 TABLET ORAL at 21:46

## 2020-06-05 RX ADMIN — MICONAZOLE NITRATE: 20.6 POWDER TOPICAL at 21:47

## 2020-06-05 RX ADMIN — Medication 10 ML: at 00:03

## 2020-06-05 RX ADMIN — DULOXETINE HYDROCHLORIDE 120 MG: 60 CAPSULE, DELAYED RELEASE ORAL at 08:59

## 2020-06-05 RX ADMIN — SODIUM CHLORIDE, PRESERVATIVE FREE 300 UNITS: 5 INJECTION INTRAVENOUS at 09:20

## 2020-06-05 RX ADMIN — Medication 10 ML: at 21:23

## 2020-06-05 RX ADMIN — CILOSTAZOL 50 MG: 50 TABLET ORAL at 08:59

## 2020-06-05 RX ADMIN — CLONIDINE HYDROCHLORIDE 0.1 MG: 0.1 TABLET ORAL at 08:59

## 2020-06-05 SDOH — HEALTH STABILITY: MENTAL HEALTH: HOW OFTEN DO YOU HAVE A DRINK CONTAINING ALCOHOL?: NEVER

## 2020-06-05 ASSESSMENT — PAIN SCALES - GENERAL
PAINLEVEL_OUTOF10: 6
PAINLEVEL_OUTOF10: 8
PAINLEVEL_OUTOF10: 0

## 2020-06-05 NOTE — PROGRESS NOTES
Occupational Therapy   Occupational Therapy Initial Assessment  Date: 2020   Patient Name: Ariella Mcmillan  MRN: [id number]     : 1955  Room: 5508A    Referring Practitioner: Lisandra Huerta MD  Diagnosis: [place]ita Cluster- RLE I&D 2020; removeal of muscle RLE 2020  Additional Pertinent Hx: DM, CKD, HTN, HLD, RAGHU & Obesity    Date of Service: 2020    Discharge Recommendations:  Home with assist PRN, Home with nursing aide, Home with Home health OT     Restrictions  Fall Risk &RAKA    Subjective   Chart Reviewed: Yes  Family / Caregiver Present: No  Subjective: Pt presents supine in bed & was agreeable to OT intervention  Pain Comments: Pt did c.o pain RLE 6/10    Social/Functional History  Lives With: Alone  Type of Home: Apartment  Home Layout: One level(laundry on 1st floor near his apartment)  Home Access: Stairs to enter without rails  Entrance Stairs - Number of Steps: 1 Step into apt building  Bathroom Shower/Tub: Tub/Shower unit, Curtain  Bathroom Equipment: Tub transfer bench  Home Equipment: Rolling walker, [employer]  ADL Assistance: Independent  Homemaking Assistance: Independent  Ambulation Assistance: Independent  Transfer Assistance: Independent  Active : Yes  Mode of Transportation: Car  Occupation: Retired  Type of occupation:   IADL Comments: PLOF pt independent in all areas & used wc but did ambulate short distances in the home     Objective   Vision: Impaired  Vision Exceptions: Wears glasses at all times  Hearing: Within functional limits      Orientation  Overall Orientation Status: Within Functional Limits     Observation/Palpation  Posture: Fair     Balance  Sitting Balance: Supervision  Standing Balance: Minimal assistance  Functional Mobility  Functional - Mobility Device: Rolling Walker(close wc follow)  Activity: Other  Assist Level: Minimal assistance  Functional Mobility Comments: Pt hopped 5 feet with min vc's for sequencing & safety with a close wc follow. Pt demo Sup wc propulsion on the unit  Toilet Transfers  Equipment Used: Drop-arm extra wide bedside commode  Toilet Transfer: Moderate assistance  Toilet Transfers Comments: vc's for safety & body mechanics     ADL  Feeding: Setup  Grooming: Supervision;Setup(seated)  UE Bathing: Supervision;Setup  LE Bathing: Moderate assistance;Verbal cueing;Setup; Increased time to complete  UE Dressing: Supervision;Setup  LE Dressing: Maximum assistance; Increased time to complete;Verbal cueing;Setup(introdcued AE )     Tone RUE  RUE Tone: Normotonic  Tone LUE  LUE Tone: Normotonic  Coordination  Movements Are Fluid And Coordinated: No  Coordination and Movement description: Fine motor impairments; Left UE;Right UE  Quality of Movement Other  Comment: neuropathy B hands     Bed mobility  Supine to Sit: Minimal assistance  Scooting: Moderate assistance  Transfers  Stand Pivot Transfers: Minimal assistance(from an elevated surface height)  Sit to stand: Minimal assistance; Moderate assistance  Stand to sit: Minimal assistance  Transfer Comments: Pt require vc's & assurance. Pt responded well to education     Vision - Basic Assessment  Prior Vision: Wears glasses all the time  Visual History: No significant visual history  Patient Visual Report: No visual complaint reported. Cognition  Overall Cognitive Status: WFL     Sensation  Overall Sensation Status: Impaired  Light Touch: Severe deficits in the LUE;Partial deficits in the RUE      BUE AROM: BUE AROM in all planes WFL  Right & Left Hand AROM: B hand grasp & relelase WFL. Pt able to grasp & release.  Pt also able to oppose his thumb to each digit  LUE Strength  L Hand: 4/5  RUE Strength  R Hand: 4-/5  BUE Strength Comment: BUE shoulder, elbow & wrist-  4/5 in all planes     Hand Dominance: Right    Left Hand Strength -    Handle Setting 2: 65# & norm for pt age & gender is 77#  Right Hand Strength -   Handle Setting 2: 66# & norm for pt age & gender is 90#    Fine Motor ensure pt safety  Long term goals 6: Pt demo SBA tub trf with appropriate DME to ensure pt safety  Long term goal 7: Pt demo Mod I light homemaking activity @ wc levl  Long term goal 8: Pt demo G- endurance for a 20-30 minute functional activity  Long term goal 9: Pt demo Mod I wc porpulsion thorughout the OT apartment & on the unit  Long term goal 10: Pt demo improved  strength & FMC to improve pt ability to complete ADLs as evidenced by gains made on 9-hole peg test L hand 1 minute & 32 seconds & norm for pt age & gender is 21.6 seconds & R hand 45.9 seconds & norm for pt age & gender is 20.9 seconds &  strength R hand 66# & norm for pt age & gender is 90# & L hand 65# & norm for pt age & gender is 77#  Patient Goals   Patient goals : \"Be able to move. \"     Therapy Time   Individual Concurrent Group Co-treatment   Time In 755-OT eval  805-OT rx         Time Out 805-OT eval  845-OT rx         Minutes 50 Min OT total  10 Min OT eval  40 Min OT rx          Demario Omer, OTR/L [id number]

## 2020-06-05 NOTE — PROGRESS NOTES
standing balance ModA with bariatric WW       Date Family Teach Completed        Is additional Family Teaching Needed? Y or N        Hindering Progress Debility, R residual limb contracture, pain       PT recommended ELOS 3-4 weeks       Team's Discharge Plan        Therapist at Team Meeting          Pt is alert and Oriented x3  Sensation: grossly intact  Edema: significant residual limb edema noted  Endurance: poor   Skin was inspected: grossly intact  Chair alarm: NA, pt verbalized understanding to utilize call light     ASSESSMENT  Pt displays functional ability as noted in the objective portion of this evaluation. Comments:  Pt pleasant and agreeable to session. Reviewed sliding board transfer with pt as method to transfer with staff on unit until standing transfers improved. Once sidelying on mat pt was instructed on sidelying hip extension TE with overstretch provided by therapist. R residual limb exhibits low pain tolerance and only light stretch tolerated. Pt verbalized understanding to perform sidelying TE in bed when in room. R hip flexion contracture would pose barrier to prosthetic use at this time, plan to progress mat activities with goal of improving R hip ROM. Plan to progress standing activity in parallel bars. Pt limited by gross debility. Patient education  Pt educated on safe hand placement with sliding board transfer    Patient response to education:   Pt verbalized understanding Pt demonstrated skill Pt requires further education in this area   yes partial yes     Rehab potential is Good for reaching above PT goals. Pts/ family goals   1. Return home     Patient and or family understand(s) diagnosis, prognosis, and plan of care. PLAN  PT care will be provided in accordance with the objectives noted above. Whenever appropriate, clear delegation orders will be provided for nursing staff.   Exercises and functional mobility practice will be used as well as appropriate assistive devices or modalities to obtain goals. Patient and family education will also be administered as needed. Frequency of treatments will be 2x/day M-F and 1x/day Sat or Sun x  21-28 days.     Time in: 0915  Time out: [address], PT, DPT  [id number]

## 2020-06-05 NOTE — H&P
extremity. 3.  Peripheral vascular disease. 4.  Type 1 diabetes mellitus; insulin dependent, uncontrolled. 5.  Hypertension. 6.  Chronic renal impairment. INDIVIDUALIZED OVERALL PLAN OF CARE:  I think the patient is a good  candidate for further rehab. We will continue to monitor diabetes and  put him probably on a better regimen for home now that the infection is  stable. He is not having much in terms of pain and _____ oxycodone for  that. We will address bowel and bladder issues. His renal function  right now is at about baseline with a creatinine of 2.4. We will look  at functioning primarily at a wheelchair level at home. Rehab prognosis  is good. Medical prognosis is good. PT will be working on ambulation, transfers and advanced transfers an  hour and a half 5 to 7 days a week with goals of modified independent. OT will be working on upper extremity strengthening, functioning, ADL  skills and basic homemaking an hour and a half 5 to 7 days a week with  goals of modified independent. He will have 24-hour-a-day,  seven-day-a-week rehab nursing to address bowel and bladder issues,  carryover from therapy and safety. Overall length of stay will probably be about two weeks with the patient  returning home at the end of that time.         Jackie Lyle MD    D: 06/05/2020 8:41:23       T: 06/05/2020 8:51:13     [email or web address]  Job#: [id number]     Doc#: [id number]    CC:

## 2020-06-05 NOTE — PROGRESS NOTES
Physical Therapy    Facility/Department: [address] [facility]  Treatment Note     NAME: Renée Joya  : 1955  MRN: [id number]    Date of Service: 2020     Evaluating Therapist: Nima Hill, PT, DPT    ROOM: [facility]  DIAGNOSIS: R AKA  PRECAUTIONS: falls, R AKA  HPI: Pt has hx AKA in 2018. Pt admitted to [facility] due to residual limb infection, underwent R Leg I&D 2020,   R Thigh Wound Dressing Change, Debridement, Removal of Muscle 2020,   Excisional debridement of the infected necrotic wound right groin and right thigh 2020. [facility] Social:  Pt lives with alone in a 1 floor plan 1 steps without rails to enter. Pt reports having HH aide present x 3 hrs daily to assist with homemaking/ADLs  Prior to admission: Pt ambulated short distances with [employer] and R prosthetic, reports having WC and performing majority of ADLs/activities in home at [facility] level     Initial Evaluation  20 AM     PM    Short Term Goals Long Term Goals    Was pt agreeable to Eval/treatment? Yes  See comments     Does pt have pain?  Pt c/o significant pain at R residual limb       Bed Mobility  Rolling: Mila  Supine to sit: Mila  Sit to supine: Mila  Scooting: Mila   SBA Modified Independent   Transfers Sit to stand: ModA from raised EOM  Stand to sit: ModA  Stand pivot: ModA with Bariatric [employer]    Sliding board transfer Mila from Juan Jose Schwab table   SBA Modified Independent   Ambulation    TBA   10 feet with bariatric [employer] with SBA 50 feet with Bariatric [employer] with Supervision   Wheel Chair Mobility 150 feet with BUE with SBA       Car Transfers TBA   Mila SBA   Stair negotiation: ascended and descended TBA   1 step with 2 rails with Mila 2 steps with 2 rail with SBA   Curb Step:   ascended and descended TBA   2 inch step with AAD and Mila 7.5 inch step with AAD and SBA   BLE ROM LLE WNL  R residual limb exhibits ~30 degree hip flexion        BLE Strength LLE grossly 4/5         Balance  Static and dynamic standing balance ModA with bariatric [employer]       Date Family Teach Completed        Is additional Family Teaching Needed? Y or N        Hindering Progress Debility, R residual limb contracture, pain       PT recommended ELOS 3-4 weeks       Team's Discharge Plan        Therapist at Team Meeting          PM  Pt in bed upon arrival.  Attempted to assist pt to edge of bed however pt reported increased pain of R residual limb. Nursing notified of pt pain level and reported not time for pain medication. Discussed with pt and pt declined therapy attempt due to increased pain. Will attempt again at a later date. Continue with POC.       License [id number]  IFOK [id number]

## 2020-06-06 LAB
METER GLUCOSE: 125 MG/DL (ref 74–99)
METER GLUCOSE: 129 MG/DL (ref 74–99)
METER GLUCOSE: 160 MG/DL (ref 74–99)
METER GLUCOSE: 173 MG/DL (ref 74–99)

## 2020-06-06 PROCEDURE — 2580000003 HC RX 258: Performed by: PHYSICAL MEDICINE & REHABILITATION

## 2020-06-06 PROCEDURE — 82962 GLUCOSE BLOOD TEST: CPT

## 2020-06-06 PROCEDURE — 1280000000 HC REHAB R&B

## 2020-06-06 PROCEDURE — 97530 THERAPEUTIC ACTIVITIES: CPT

## 2020-06-06 PROCEDURE — 99232 SBSQ HOSP IP/OBS MODERATE 35: CPT | Performed by: PHYSICAL MEDICINE & REHABILITATION

## 2020-06-06 PROCEDURE — 6360000002 HC RX W HCPCS: Performed by: PHYSICAL MEDICINE & REHABILITATION

## 2020-06-06 PROCEDURE — 97110 THERAPEUTIC EXERCISES: CPT

## 2020-06-06 PROCEDURE — 97535 SELF CARE MNGMENT TRAINING: CPT

## 2020-06-06 PROCEDURE — 6370000000 HC RX 637 (ALT 250 FOR IP): Performed by: PHYSICAL MEDICINE & REHABILITATION

## 2020-06-06 RX ADMIN — CILOSTAZOL 50 MG: 50 TABLET ORAL at 08:40

## 2020-06-06 RX ADMIN — BISACODYL 5 MG: 5 TABLET, COATED ORAL at 08:40

## 2020-06-06 RX ADMIN — METOPROLOL TARTRATE 25 MG: 25 TABLET, FILM COATED ORAL at 20:22

## 2020-06-06 RX ADMIN — Medication 10 ML: at 21:27

## 2020-06-06 RX ADMIN — DULOXETINE HYDROCHLORIDE 120 MG: 60 CAPSULE, DELAYED RELEASE ORAL at 08:40

## 2020-06-06 RX ADMIN — MICONAZOLE NITRATE: 20.6 POWDER TOPICAL at 08:41

## 2020-06-06 RX ADMIN — ENOXAPARIN SODIUM 40 MG: 40 INJECTION SUBCUTANEOUS at 08:40

## 2020-06-06 RX ADMIN — BISACODYL 5 MG: 5 TABLET, COATED ORAL at 20:21

## 2020-06-06 RX ADMIN — INSULIN LISPRO 2 UNITS: 100 INJECTION, SOLUTION INTRAVENOUS; SUBCUTANEOUS at 17:15

## 2020-06-06 RX ADMIN — AMLODIPINE BESYLATE 5 MG: 5 TABLET ORAL at 08:40

## 2020-06-06 RX ADMIN — OXYCODONE 5 MG: 5 TABLET ORAL at 03:00

## 2020-06-06 RX ADMIN — MICONAZOLE NITRATE: 20.6 POWDER TOPICAL at 20:22

## 2020-06-06 RX ADMIN — CLONIDINE HYDROCHLORIDE 0.1 MG: 0.1 TABLET ORAL at 08:39

## 2020-06-06 RX ADMIN — SODIUM CHLORIDE, PRESERVATIVE FREE 300 UNITS: 5 INJECTION INTRAVENOUS at 21:27

## 2020-06-06 RX ADMIN — OXYCODONE 5 MG: 5 TABLET ORAL at 20:22

## 2020-06-06 RX ADMIN — CLONIDINE HYDROCHLORIDE 0.1 MG: 0.1 TABLET ORAL at 20:21

## 2020-06-06 RX ADMIN — ATORVASTATIN CALCIUM 10 MG: 10 TABLET, FILM COATED ORAL at 08:40

## 2020-06-06 RX ADMIN — Medication 10 ML: at 10:44

## 2020-06-06 RX ADMIN — METOPROLOL TARTRATE 25 MG: 25 TABLET, FILM COATED ORAL at 08:40

## 2020-06-06 RX ADMIN — CILOSTAZOL 50 MG: 50 TABLET ORAL at 20:21

## 2020-06-06 RX ADMIN — ENOXAPARIN SODIUM 40 MG: 40 INJECTION SUBCUTANEOUS at 20:21

## 2020-06-06 RX ADMIN — SODIUM CHLORIDE, PRESERVATIVE FREE 300 UNITS: 5 INJECTION INTRAVENOUS at 10:44

## 2020-06-06 ASSESSMENT — PAIN DESCRIPTION - FREQUENCY
FREQUENCY: INTERMITTENT
FREQUENCY: INTERMITTENT

## 2020-06-06 ASSESSMENT — PAIN DESCRIPTION - LOCATION
LOCATION: LEG
LOCATION: LEG

## 2020-06-06 ASSESSMENT — PAIN DESCRIPTION - ONSET
ONSET: ON-GOING
ONSET: ON-GOING

## 2020-06-06 ASSESSMENT — PAIN DESCRIPTION - PAIN TYPE
TYPE: SURGICAL PAIN

## 2020-06-06 ASSESSMENT — PAIN SCALES - GENERAL
PAINLEVEL_OUTOF10: 0
PAINLEVEL_OUTOF10: 10
PAINLEVEL_OUTOF10: 7
PAINLEVEL_OUTOF10: 0
PAINLEVEL_OUTOF10: 5

## 2020-06-06 ASSESSMENT — PAIN DESCRIPTION - PROGRESSION
CLINICAL_PROGRESSION: NOT CHANGED
CLINICAL_PROGRESSION: NOT CHANGED

## 2020-06-06 ASSESSMENT — PAIN - FUNCTIONAL ASSESSMENT: PAIN_FUNCTIONAL_ASSESSMENT: PREVENTS OR INTERFERES WITH MANY ACTIVE NOT PASSIVE ACTIVITIES

## 2020-06-06 ASSESSMENT — PAIN DESCRIPTION - DESCRIPTORS
DESCRIPTORS: PENETRATING;ACHING;DISCOMFORT
DESCRIPTORS: ACHING;DISCOMFORT

## 2020-06-06 ASSESSMENT — PAIN DESCRIPTION - ORIENTATION
ORIENTATION: RIGHT
ORIENTATION: RIGHT

## 2020-06-06 NOTE — PROGRESS NOTES
OCCUPATIONAL THERAPY DAILY NOTE    Date:2020  Patient Name: Mayito Zayas  MRN: [id number]  : 1955  Room: [address] [place] [address]     Referring Practitioner: Dylan Coronel MD  Diagnosis: Orien Officer- RLE I&D 2020; removeal of muscle RLE 2020  Additional Pertinent Hx: DM, CKD, HTN, HLD, RAGHU & Obesity    Precautions:  Fall risk, R AKA     Functional Assessment:   Date Status AE  Comments   Feeding 20  setup      Grooming 2020 Supervision  Bed level  Pt completed face/hand washing at bed level including applying deodorant    Bathing 20 UE bathing:SBA   LE bathing: MOD A   Bed bath needing SBA for UB skills while LB required mod assist   UB Dressing 20 SBA   To oleg t- shirt and pull garment down sides and back    LB Dressing 20 MAX A   Max assist to oleg hospital pants over R stump and LLE while in bed. Pt attempted to assist pulling garment up over hips using bridging method and rolling R/L side in bed. Homemaking         Functional Transfers / Balance:   Date Status DME  Comments   Sit Balance 20  supervision   Sitting balance on EOB and up in w/c    Stand Balance 20 Min A x2   W/w  Std walker  Standing balance using std walker from EOB then during hopping over to w/c using LLE only. 2nd person present for patient safety for balance and one legged skills. [] Tub  [] Shower   Transfer 20 TBD      Commode   Transfer 20 MOD A  Drop arm bariatric BSC     Functional   Mobility 20 MIN A  w/w    Other: supine to EOB  20 Min A  Pt completed supine to EOB needing assist to scoot trunk to edge of bed. Functional Exercises / Activity:  Pt engaged in am ADL's including grooming, dressing and bathing to increase independence with ADL;s while in bed. Pt completed supine to EOB transfers at min assist due to R stump. BUE ex's wearing 2# wts while tolerating resistive arm bike 2-3 reps of 4 mins to increase overall strength/endurance for ADL's, transfers and mobility tasks. Sensory / Neuromuscular Re-Education:      Cognitive Skills:   Status Comments   Problem   Solving fair  Demo during ADL;s, standing balance, bed>w/c transfers using std. Walker. Memory fair  \"   Sequencing fair  \"   Safety fair  \"     Visual Perception:    Education:  Pt educated with regards to safety during EOB to w/c transfers using std. Walker and hopping over method for one legged skills. .     [] Family teach completed on:    Pain Level: pt having c/o pain in R LE residual limb 7/10. Additional Notes:   6/6/20 Pt tearful this am since did not have any clothes and emotional in general with this therapist offering him support. Patient has made fair  progress during treatment sessions toward set goals. Therapy emphasis to obtain goals: Strengthening, Wheelchair Mobility Training, Positioning, Gait Training, Safety Education & Training, Balance Training, Patient/Caregiver Education & Training, Self-Care / ADL, Functional Mobility Training, Equipment Evaluation, Education, & procurement, Home Management Training, Endurance Training      [x] Continue with current OT Plan of care.   [] Prepare for Discharge     DISCHARGE RECOMMENDATIONS  Recommended DME:    Post Discharge Care:   []Home Independently  [x]Home with assist PRN [x]Home with nursing aide  [x]Home with Home Health OT []Home with Out Pt OT []Other: ___   Comments:         Time in Time out Tx Time Breakdown  Variance:   First Session   9:30am 10:40am [x] Individual Tx- 70  [] Concurrent Tx -  [] Co-Tx -   [] Group Tx -   [] Time Missed -     Second Session   [] Individual Tx-  min  [] Concurrent Tx - min  [] Co-Tx -   [] Group Tx -   [] Time Missed -     Third Session    [] Individual Tx-   [] Concurrent Tx -  [] Co-Tx -   [] Group Tx -   [] Time Missed -         Total Tx Time  70 min     Joe BUCK/CHRISSIE [id number]

## 2020-06-07 LAB
METER GLUCOSE: 145 MG/DL (ref 74–99)
METER GLUCOSE: 147 MG/DL (ref 74–99)
METER GLUCOSE: 164 MG/DL (ref 74–99)
METER GLUCOSE: 173 MG/DL (ref 74–99)

## 2020-06-07 PROCEDURE — 6360000002 HC RX W HCPCS: Performed by: PHYSICAL MEDICINE & REHABILITATION

## 2020-06-07 PROCEDURE — 97530 THERAPEUTIC ACTIVITIES: CPT

## 2020-06-07 PROCEDURE — 6370000000 HC RX 637 (ALT 250 FOR IP): Performed by: PHYSICAL MEDICINE & REHABILITATION

## 2020-06-07 PROCEDURE — 99233 SBSQ HOSP IP/OBS HIGH 50: CPT | Performed by: PHYSICAL MEDICINE & REHABILITATION

## 2020-06-07 PROCEDURE — 1280000000 HC REHAB R&B

## 2020-06-07 PROCEDURE — 2580000003 HC RX 258: Performed by: PHYSICAL MEDICINE & REHABILITATION

## 2020-06-07 PROCEDURE — 82962 GLUCOSE BLOOD TEST: CPT

## 2020-06-07 RX ORDER — GABAPENTIN 100 MG/1
100 CAPSULE ORAL 3 TIMES DAILY
Status: DISCONTINUED | OUTPATIENT
Start: 2020-06-07 | End: 2020-06-08

## 2020-06-07 RX ORDER — OXYCODONE HYDROCHLORIDE 5 MG/1
5 TABLET ORAL EVERY 4 HOURS PRN
Status: DISCONTINUED | OUTPATIENT
Start: 2020-06-07 | End: 2020-06-08

## 2020-06-07 RX ORDER — PETROLATUM 42 G/100G
OINTMENT TOPICAL 2 TIMES DAILY
Status: DISCONTINUED | OUTPATIENT
Start: 2020-06-07 | End: 2020-06-23 | Stop reason: HOSPADM

## 2020-06-07 RX ORDER — OXYCODONE HYDROCHLORIDE 5 MG/1
2.5 TABLET ORAL EVERY 4 HOURS PRN
Status: DISCONTINUED | OUTPATIENT
Start: 2020-06-07 | End: 2020-06-08

## 2020-06-07 RX ORDER — PETROLATUM 42 G/100G
OINTMENT TOPICAL 2 TIMES DAILY PRN
Status: DISCONTINUED | OUTPATIENT
Start: 2020-06-07 | End: 2020-06-07

## 2020-06-07 RX ADMIN — MICONAZOLE NITRATE: 20.6 POWDER TOPICAL at 09:18

## 2020-06-07 RX ADMIN — GABAPENTIN 100 MG: 100 CAPSULE ORAL at 20:22

## 2020-06-07 RX ADMIN — CILOSTAZOL 50 MG: 50 TABLET ORAL at 20:22

## 2020-06-07 RX ADMIN — SODIUM CHLORIDE, PRESERVATIVE FREE 300 UNITS: 5 INJECTION INTRAVENOUS at 10:19

## 2020-06-07 RX ADMIN — GABAPENTIN 100 MG: 100 CAPSULE ORAL at 13:11

## 2020-06-07 RX ADMIN — METOPROLOL TARTRATE 25 MG: 25 TABLET, FILM COATED ORAL at 09:10

## 2020-06-07 RX ADMIN — MICONAZOLE NITRATE: 20.6 POWDER TOPICAL at 20:50

## 2020-06-07 RX ADMIN — DULOXETINE HYDROCHLORIDE 120 MG: 60 CAPSULE, DELAYED RELEASE ORAL at 09:09

## 2020-06-07 RX ADMIN — OXYCODONE 5 MG: 5 TABLET ORAL at 09:07

## 2020-06-07 RX ADMIN — CLONIDINE HYDROCHLORIDE 0.1 MG: 0.1 TABLET ORAL at 09:08

## 2020-06-07 RX ADMIN — INSULIN LISPRO 2 UNITS: 100 INJECTION, SOLUTION INTRAVENOUS; SUBCUTANEOUS at 11:26

## 2020-06-07 RX ADMIN — CILOSTAZOL 50 MG: 50 TABLET ORAL at 09:09

## 2020-06-07 RX ADMIN — AMLODIPINE BESYLATE 5 MG: 5 TABLET ORAL at 09:09

## 2020-06-07 RX ADMIN — CLONIDINE HYDROCHLORIDE 0.1 MG: 0.1 TABLET ORAL at 20:22

## 2020-06-07 RX ADMIN — ENOXAPARIN SODIUM 40 MG: 40 INJECTION SUBCUTANEOUS at 09:10

## 2020-06-07 RX ADMIN — PETROLATUM: 42 OINTMENT TOPICAL at 20:54

## 2020-06-07 RX ADMIN — OXYCODONE 5 MG: 5 TABLET ORAL at 13:11

## 2020-06-07 RX ADMIN — Medication 10 ML: at 10:19

## 2020-06-07 RX ADMIN — ENOXAPARIN SODIUM 40 MG: 40 INJECTION SUBCUTANEOUS at 20:23

## 2020-06-07 RX ADMIN — ATORVASTATIN CALCIUM 10 MG: 10 TABLET, FILM COATED ORAL at 09:09

## 2020-06-07 RX ADMIN — SODIUM CHLORIDE, PRESERVATIVE FREE 300 UNITS: 5 INJECTION INTRAVENOUS at 22:11

## 2020-06-07 RX ADMIN — Medication 10 ML: at 22:10

## 2020-06-07 RX ADMIN — METOPROLOL TARTRATE 25 MG: 25 TABLET, FILM COATED ORAL at 20:22

## 2020-06-07 RX ADMIN — BISACODYL 5 MG: 5 TABLET, COATED ORAL at 09:09

## 2020-06-07 ASSESSMENT — PAIN DESCRIPTION - LOCATION
LOCATION: INCISION;HIP;LEG
LOCATION: INCISION;LEG

## 2020-06-07 ASSESSMENT — PAIN DESCRIPTION - PAIN TYPE
TYPE: SURGICAL PAIN
TYPE: SURGICAL PAIN

## 2020-06-07 ASSESSMENT — PAIN SCALES - GENERAL
PAINLEVEL_OUTOF10: 0
PAINLEVEL_OUTOF10: 0
PAINLEVEL_OUTOF10: 8
PAINLEVEL_OUTOF10: 5
PAINLEVEL_OUTOF10: 0
PAINLEVEL_OUTOF10: 0
PAINLEVEL_OUTOF10: 7

## 2020-06-07 ASSESSMENT — PAIN DESCRIPTION - DESCRIPTORS
DESCRIPTORS: CONSTANT;ACHING;BURNING;SORE
DESCRIPTORS: CONSTANT;DISCOMFORT;ACHING;SORE

## 2020-06-07 ASSESSMENT — PAIN DESCRIPTION - ORIENTATION
ORIENTATION: RIGHT;UPPER
ORIENTATION: RIGHT;UPPER

## 2020-06-07 ASSESSMENT — PAIN DESCRIPTION - FREQUENCY
FREQUENCY: CONTINUOUS
FREQUENCY: CONTINUOUS

## 2020-06-07 ASSESSMENT — PAIN DESCRIPTION - PROGRESSION
CLINICAL_PROGRESSION: NOT CHANGED
CLINICAL_PROGRESSION: NOT CHANGED

## 2020-06-07 ASSESSMENT — PAIN DESCRIPTION - ONSET
ONSET: ON-GOING
ONSET: ON-GOING

## 2020-06-07 NOTE — PROGRESS NOTES
Physical Therapy    Facility/Department: [address] REHAB  Treatment Note   NAME: Elena Schmidt  : 1955  MRN: [id number]    Date of Service: 2020     Evaluating Therapist: Hamida Brock, PT, DPT    ROOM: 6047-L  DIAGNOSIS: R AKA  PRECAUTIONS: falls, R AKA  HPI: Pt has hx AKA in 2018. Pt admitted to [facility] due to residual limb infection, underwent R Leg I&D 2020,   R Thigh Wound Dressing Change, Debridement, Removal of Muscle 2020,   Excisional debridement of the infected necrotic wound right groin and right thigh 2020. [facility] Social:  Pt lives with alone in a 1 floor plan 1 steps without rails to enter. Pt reports having HH aide present x 3 hrs daily to assist with homemaking/ADLs  Prior to admission: Pt ambulated short distances with [address] and R prosthetic, reports having WC and performing majority of ADLs/activities in home at [facility] level     Initial Evaluation  20 AM       Short Term Goals Long Term Goals    Was pt agreeable to Eval/treatment? Yes yes      Does pt have pain?  Pt c/o significant pain at R residual limb 5/10 R residual limb pain       Bed Mobility  Rolling: Makenzie  Supine to sit: Makenzie  Sit to supine: Makenzie  Scooting: Makenzie Rolling: cgA  Supine to sit: cgA  Sit to supine: NT  Scooting: cgA  SBA Modified Independent   Transfers Sit to stand: ModA from raised EOM  Stand to sit: ModA  Stand pivot: ModA with Bariatric [address]    Sliding board transfer Makenzie from Juan Jose Schwab table Sit to stand: Makenzie  Stand to sit: Makenzie  Stand pivot: Makenzie [facility]    Sliding board: sba   SBA Modified Independent   Ambulation    TBA 3' with bariatric ww min/modA  10 feet with bariatric WW with SBA 50 feet with Bariatric [address] with Supervision   Wheel Chair Mobility 150 feet with BUE with ' with ww sba/sup       Car Transfers TBA   Makenzie SBA   Stair negotiation: ascended and descended TBA   1 step with 2 rails with Makenzie 2 steps with 2 rail with SBA   Curb Step:

## 2020-06-08 LAB
METER GLUCOSE: 140 MG/DL (ref 74–99)
METER GLUCOSE: 159 MG/DL (ref 74–99)
METER GLUCOSE: 179 MG/DL (ref 74–99)
METER GLUCOSE: 92 MG/DL (ref 74–99)

## 2020-06-08 PROCEDURE — 6360000002 HC RX W HCPCS: Performed by: PHYSICAL MEDICINE & REHABILITATION

## 2020-06-08 PROCEDURE — 1280000000 HC REHAB R&B

## 2020-06-08 PROCEDURE — 2580000003 HC RX 258: Performed by: PHYSICAL MEDICINE & REHABILITATION

## 2020-06-08 PROCEDURE — 6370000000 HC RX 637 (ALT 250 FOR IP): Performed by: PHYSICAL MEDICINE & REHABILITATION

## 2020-06-08 PROCEDURE — 82962 GLUCOSE BLOOD TEST: CPT

## 2020-06-08 PROCEDURE — 97530 THERAPEUTIC ACTIVITIES: CPT

## 2020-06-08 PROCEDURE — 97110 THERAPEUTIC EXERCISES: CPT

## 2020-06-08 RX ORDER — OXYCODONE HYDROCHLORIDE 5 MG/1
5 TABLET ORAL EVERY 4 HOURS PRN
Status: DISCONTINUED | OUTPATIENT
Start: 2020-06-08 | End: 2020-06-11

## 2020-06-08 RX ORDER — PREGABALIN 150 MG/1
150 CAPSULE ORAL 2 TIMES DAILY
Status: DISCONTINUED | OUTPATIENT
Start: 2020-06-08 | End: 2020-06-09

## 2020-06-08 RX ADMIN — Medication 10 ML: at 20:34

## 2020-06-08 RX ADMIN — CILOSTAZOL 50 MG: 50 TABLET ORAL at 08:14

## 2020-06-08 RX ADMIN — OXYCODONE 5 MG: 5 TABLET ORAL at 11:33

## 2020-06-08 RX ADMIN — SODIUM CHLORIDE, PRESERVATIVE FREE 300 UNITS: 5 INJECTION INTRAVENOUS at 08:54

## 2020-06-08 RX ADMIN — OXYCODONE 5 MG: 5 TABLET ORAL at 06:26

## 2020-06-08 RX ADMIN — OXYCODONE 5 MG: 5 TABLET ORAL at 20:34

## 2020-06-08 RX ADMIN — ENOXAPARIN SODIUM 40 MG: 40 INJECTION SUBCUTANEOUS at 20:33

## 2020-06-08 RX ADMIN — METOPROLOL TARTRATE 25 MG: 25 TABLET, FILM COATED ORAL at 20:35

## 2020-06-08 RX ADMIN — ENOXAPARIN SODIUM 40 MG: 40 INJECTION SUBCUTANEOUS at 08:13

## 2020-06-08 RX ADMIN — CILOSTAZOL 50 MG: 50 TABLET ORAL at 20:35

## 2020-06-08 RX ADMIN — ATORVASTATIN CALCIUM 10 MG: 10 TABLET, FILM COATED ORAL at 08:15

## 2020-06-08 RX ADMIN — DULOXETINE HYDROCHLORIDE 120 MG: 60 CAPSULE, DELAYED RELEASE ORAL at 08:15

## 2020-06-08 RX ADMIN — PETROLATUM: 42 OINTMENT TOPICAL at 08:15

## 2020-06-08 RX ADMIN — PREGABALIN 150 MG: 150 CAPSULE ORAL at 08:14

## 2020-06-08 RX ADMIN — AMLODIPINE BESYLATE 5 MG: 5 TABLET ORAL at 08:15

## 2020-06-08 RX ADMIN — MICONAZOLE NITRATE: 20.6 POWDER TOPICAL at 08:15

## 2020-06-08 RX ADMIN — CLONIDINE HYDROCHLORIDE 0.1 MG: 0.1 TABLET ORAL at 20:34

## 2020-06-08 RX ADMIN — METOPROLOL TARTRATE 25 MG: 25 TABLET, FILM COATED ORAL at 08:14

## 2020-06-08 RX ADMIN — PREGABALIN 150 MG: 150 CAPSULE ORAL at 20:34

## 2020-06-08 RX ADMIN — Medication 10 ML: at 08:55

## 2020-06-08 RX ADMIN — OXYCODONE 5 MG: 5 TABLET ORAL at 15:44

## 2020-06-08 RX ADMIN — MICONAZOLE NITRATE: 20.6 POWDER TOPICAL at 21:00

## 2020-06-08 RX ADMIN — SODIUM CHLORIDE, PRESERVATIVE FREE 300 UNITS: 5 INJECTION INTRAVENOUS at 20:46

## 2020-06-08 RX ADMIN — PETROLATUM: 42 OINTMENT TOPICAL at 21:00

## 2020-06-08 RX ADMIN — CLONIDINE HYDROCHLORIDE 0.1 MG: 0.1 TABLET ORAL at 08:14

## 2020-06-08 RX ADMIN — INSULIN LISPRO 2 UNITS: 100 INJECTION, SOLUTION INTRAVENOUS; SUBCUTANEOUS at 11:30

## 2020-06-08 ASSESSMENT — PAIN SCALES - GENERAL
PAINLEVEL_OUTOF10: 5
PAINLEVEL_OUTOF10: 3
PAINLEVEL_OUTOF10: 0
PAINLEVEL_OUTOF10: 7
PAINLEVEL_OUTOF10: 7
PAINLEVEL_OUTOF10: 8
PAINLEVEL_OUTOF10: 0
PAINLEVEL_OUTOF10: 7
PAINLEVEL_OUTOF10: 0
PAINLEVEL_OUTOF10: 8
PAINLEVEL_OUTOF10: 0
PAINLEVEL_OUTOF10: 5

## 2020-06-08 ASSESSMENT — PAIN DESCRIPTION - PROGRESSION
CLINICAL_PROGRESSION: NOT CHANGED

## 2020-06-08 ASSESSMENT — PAIN DESCRIPTION - LOCATION
LOCATION: INCISION;LEG
LOCATION: LEG
LOCATION: INCISION;LEG

## 2020-06-08 ASSESSMENT — PAIN DESCRIPTION - DESCRIPTORS
DESCRIPTORS: ACHING;CONSTANT;DISCOMFORT
DESCRIPTORS: CONSTANT;ACHING;DISCOMFORT
DESCRIPTORS: CONSTANT;DISCOMFORT;ACHING;THROBBING

## 2020-06-08 ASSESSMENT — PAIN DESCRIPTION - ORIENTATION
ORIENTATION: RIGHT
ORIENTATION: RIGHT;UPPER
ORIENTATION: RIGHT;UPPER

## 2020-06-08 ASSESSMENT — PAIN DESCRIPTION - ONSET
ONSET: ON-GOING

## 2020-06-08 ASSESSMENT — PAIN DESCRIPTION - PAIN TYPE
TYPE: SURGICAL PAIN

## 2020-06-08 ASSESSMENT — PAIN DESCRIPTION - FREQUENCY
FREQUENCY: CONTINUOUS

## 2020-06-08 NOTE — PROGRESS NOTES
OCCUPATIONAL THERAPY DAILY NOTE    Date:2020  Patient Name: Reji Lamb  MRN: [id number]  : 1955  Room: [address] [place] [address]     Referring Practitioner: China Goldstein MD  Diagnosis: Deb Cleary- RLE I&D 2020; removeal of muscle RLE 2020  Additional Pertinent Hx: DM, CKD, HTN, HLD, RAGHU & Obesity    Precautions:  Fall risk, R AKA     Functional Assessment:   Date Status AE  Comments   Feeding 20  setup      Grooming 2020 Supervision  Bed level      Bathing 20 UE bathing:SBA   LE bathing: MOD A      UB Dressing 20 SBA      LB Dressing 20 MAX A      Homemaking         Functional Transfers / Balance:   Date Status DME  Comments   Sit Balance 20  supervision       Stand Balance 20 Min A x2   W/w  Std walker     [] Tub  [] Shower   Transfer 20 TBD      Commode   Transfer 20 MOD A  Drop arm bariatric BSC     Functional   Mobility 20 MIN A       MIN A  W/w      w/c       Throughout rehab unit on various surfaces requiring assist with navigating around objects and tight spaces; Other: supine to EOB       Sit<>stand  20 Min A        MIN A          High/low table          V/c's for hand placement and technique      Functional Exercises / Activity:  Pt engaged in leisure activity throwing/catching adaptive ball with velcro pad focusing on endurance, B UE AROM to increase independence with ADL tasks; Pt completed static standing at high/low table 2x for ~2 minutes focusing on standing tolerance with fair- results to increase independence with ADL tasks; Pt engaged in B UE ex's focusing on UE strength/endurance to increase independence with transfers/mobility and ADL tasks;   Ergometer 5 minutes forward/backward 3 minute rest break;   2# obdulio  up/down 1 x 5 reps; Sensory / Neuromuscular Re-Education:      Cognitive Skills:   Status Comments   Problem   Solving fair  Demo during ADL;s, standing balance, bed>w/c transfers using std.

## 2020-06-08 NOTE — PROGRESS NOTES
with 2 rails with Mila 2 steps with 2 rail with SBA   Curb Step:   ascended and descended TBA N/T  2 inch step with AAD and Mila 7.5 inch step with AAD and SBA   BLE ROM LLE WNL  R residual limb exhibits ~30 degree hip flexion        BLE Strength LLE grossly 4/5         Balance  Static and dynamic standing balance ModA with bariatric WW modA dynamic with bariatric ww. Date Family Teach Completed        Is additional Family Teaching Needed? Y or N        Hindering Progress Debility, R residual limb contracture, pain       PT recommended ELOS 3-4 weeks       Team's Discharge Plan        Therapist at Team Meeting          Therapeutic Exercise:   AM: Sit <> stand x4 reps. PM: 3x STS transfers at w/c, increased time to complete  Slide board bilateral directions, assistance with slide board placement required  SPT with bariatric ww w/c<>mat table x 2 reps  Modified seated leg press with L LE x8 minutes to fatigue with red t bands     Patient education  Pt educated  importance of OOB activity, transfer techniques     Patient response to education:   Pt verbalized understanding Pt demonstrated skill Pt requires further education in this area   yes yes reinforcement     Additional Comments: Pt refused first 30 minutes of tx session due to pt requested to get washed up first. Explained importance of participating and being ready prior to therapy session. Pt still refused until he is washed. Pt came down last 15 minutes of tx session. Pt fatigues quickly. Pt brought to O. T at end of session. In PM, continued to emphasize various aspects of transfers. Assistance required with slide board placement t/o session. Slight assistance with SPT with bariatric ww however improvement noted. Improvement with ambulation with vcA to maintain elbows extended to facilitate toe clearance. Will continue to progress as able.      AM  Time in: 9:45  Time out: 10:00    PM  Time in: 1345  Time out: 1430    Pt is making fair progress toward established Physical Therapy goals. Continue with physical therapy current plan of care.     Gaby Manjarrez [id number] (AM)    Tessy Key DPT  [id number]

## 2020-06-08 NOTE — PROGRESS NOTES
06/08/20 1250   Attendance   Activity   (Outside Visit / Tasha Hansen)   Participation Active participation   Therapeutic Recreation   Community Reintegration Demonstrates ability to complete social goals   Social Skills Cooperates with others in group activity   Leisure Education Demonstrates knowledge of benefits of leisure involvement  Susannah Strauss identified sunshine,fresh air & hand/eye coordination.)   Time Spent With Patient   Minutes 40

## 2020-06-09 LAB
METER GLUCOSE: 110 MG/DL (ref 74–99)
METER GLUCOSE: 165 MG/DL (ref 74–99)
METER GLUCOSE: 224 MG/DL (ref 74–99)
METER GLUCOSE: 268 MG/DL (ref 74–99)

## 2020-06-09 PROCEDURE — 6360000002 HC RX W HCPCS: Performed by: PHYSICAL MEDICINE & REHABILITATION

## 2020-06-09 PROCEDURE — 1280000000 HC REHAB R&B

## 2020-06-09 PROCEDURE — 6370000000 HC RX 637 (ALT 250 FOR IP): Performed by: PHYSICAL MEDICINE & REHABILITATION

## 2020-06-09 PROCEDURE — 2580000003 HC RX 258: Performed by: PHYSICAL MEDICINE & REHABILITATION

## 2020-06-09 PROCEDURE — 97530 THERAPEUTIC ACTIVITIES: CPT

## 2020-06-09 PROCEDURE — 82962 GLUCOSE BLOOD TEST: CPT

## 2020-06-09 RX ORDER — PREGABALIN 150 MG/1
150 CAPSULE ORAL DAILY
Status: DISCONTINUED | OUTPATIENT
Start: 2020-06-10 | End: 2020-06-10

## 2020-06-09 RX ADMIN — PETROLATUM: 42 OINTMENT TOPICAL at 08:20

## 2020-06-09 RX ADMIN — CLONIDINE HYDROCHLORIDE 0.1 MG: 0.1 TABLET ORAL at 08:17

## 2020-06-09 RX ADMIN — OXYCODONE 5 MG: 5 TABLET ORAL at 12:35

## 2020-06-09 RX ADMIN — ENOXAPARIN SODIUM 40 MG: 40 INJECTION SUBCUTANEOUS at 21:25

## 2020-06-09 RX ADMIN — Medication 10 ML: at 08:17

## 2020-06-09 RX ADMIN — CLONIDINE HYDROCHLORIDE 0.1 MG: 0.1 TABLET ORAL at 21:25

## 2020-06-09 RX ADMIN — OXYCODONE 5 MG: 5 TABLET ORAL at 08:23

## 2020-06-09 RX ADMIN — SODIUM CHLORIDE, PRESERVATIVE FREE 300 UNITS: 5 INJECTION INTRAVENOUS at 21:26

## 2020-06-09 RX ADMIN — INSULIN LISPRO 4 UNITS: 100 INJECTION, SOLUTION INTRAVENOUS; SUBCUTANEOUS at 11:25

## 2020-06-09 RX ADMIN — PREGABALIN 150 MG: 150 CAPSULE ORAL at 08:16

## 2020-06-09 RX ADMIN — BISACODYL 5 MG: 5 TABLET, COATED ORAL at 21:26

## 2020-06-09 RX ADMIN — INSULIN LISPRO 2 UNITS: 100 INJECTION, SOLUTION INTRAVENOUS; SUBCUTANEOUS at 06:29

## 2020-06-09 RX ADMIN — OXYCODONE 5 MG: 5 TABLET ORAL at 16:35

## 2020-06-09 RX ADMIN — MICONAZOLE NITRATE: 20.6 POWDER TOPICAL at 08:20

## 2020-06-09 RX ADMIN — AMLODIPINE BESYLATE 5 MG: 5 TABLET ORAL at 08:17

## 2020-06-09 RX ADMIN — CILOSTAZOL 50 MG: 50 TABLET ORAL at 21:25

## 2020-06-09 RX ADMIN — INSULIN LISPRO 3 UNITS: 100 INJECTION, SOLUTION INTRAVENOUS; SUBCUTANEOUS at 21:26

## 2020-06-09 RX ADMIN — PETROLATUM: 42 OINTMENT TOPICAL at 21:37

## 2020-06-09 RX ADMIN — METOPROLOL TARTRATE 25 MG: 25 TABLET, FILM COATED ORAL at 08:16

## 2020-06-09 RX ADMIN — Medication 10 ML: at 21:26

## 2020-06-09 RX ADMIN — DULOXETINE HYDROCHLORIDE 120 MG: 60 CAPSULE, DELAYED RELEASE ORAL at 08:16

## 2020-06-09 RX ADMIN — MICONAZOLE NITRATE: 20.6 POWDER TOPICAL at 21:37

## 2020-06-09 RX ADMIN — SODIUM CHLORIDE, PRESERVATIVE FREE 300 UNITS: 5 INJECTION INTRAVENOUS at 08:20

## 2020-06-09 RX ADMIN — CILOSTAZOL 50 MG: 50 TABLET ORAL at 08:17

## 2020-06-09 RX ADMIN — ENOXAPARIN SODIUM 40 MG: 40 INJECTION SUBCUTANEOUS at 08:16

## 2020-06-09 RX ADMIN — METOPROLOL TARTRATE 25 MG: 25 TABLET, FILM COATED ORAL at 21:26

## 2020-06-09 RX ADMIN — ATORVASTATIN CALCIUM 10 MG: 10 TABLET, FILM COATED ORAL at 08:17

## 2020-06-09 ASSESSMENT — PAIN DESCRIPTION - DESCRIPTORS
DESCRIPTORS: ACHING;CONSTANT;THROBBING
DESCRIPTORS: CONSTANT;ACHING;THROBBING

## 2020-06-09 ASSESSMENT — PAIN DESCRIPTION - FREQUENCY
FREQUENCY: CONTINUOUS
FREQUENCY: CONTINUOUS

## 2020-06-09 ASSESSMENT — PAIN SCALES - GENERAL
PAINLEVEL_OUTOF10: 9
PAINLEVEL_OUTOF10: 0
PAINLEVEL_OUTOF10: 7
PAINLEVEL_OUTOF10: 0

## 2020-06-09 ASSESSMENT — PAIN - FUNCTIONAL ASSESSMENT: PAIN_FUNCTIONAL_ASSESSMENT: PREVENTS OR INTERFERES SOME ACTIVE ACTIVITIES AND ADLS

## 2020-06-09 ASSESSMENT — PAIN DESCRIPTION - PAIN TYPE
TYPE: SURGICAL PAIN
TYPE: SURGICAL PAIN

## 2020-06-09 ASSESSMENT — PAIN DESCRIPTION - PROGRESSION: CLINICAL_PROGRESSION: NOT CHANGED

## 2020-06-09 ASSESSMENT — PAIN DESCRIPTION - ORIENTATION
ORIENTATION: RIGHT
ORIENTATION: RIGHT

## 2020-06-09 ASSESSMENT — PAIN DESCRIPTION - ONSET
ONSET: ON-GOING
ONSET: ON-GOING

## 2020-06-09 ASSESSMENT — PAIN DESCRIPTION - LOCATION
LOCATION: LEG
LOCATION: LEG

## 2020-06-09 NOTE — PROGRESS NOTES
(HUMALOG) injection vial 0-6 Units  0-6 Units Subcutaneous Nightly Eddie FLORIDALMA Gomez MD        metoprolol tartrate (LOPRESSOR) tablet 25 mg  25 mg Oral BID Dillon Fuentes MD   25 mg at 06/09/20 0816    miconazole (MICOTIN) 2 % powder   Topical BID Dillon Fuentes MD        heparin flush 100 UNIT/ML injection 300 Units  300 Units Intracatheter BID Dillon Fuentes MD   300 Units at 06/08/20 2046    sodium chloride flush 0.9 % injection 10 mL  10 mL Intravenous 2 times per day Dillon Fuentes MD   10 mL at 06/09/20 0817     No Known Allergies  Active Problems:    Above knee amputation of right lower extremity ([address]  Resolved Problems:    * No resolved hospital problems. *    Blood pressure (!) 140/74, pulse 62, temperature 98.3 °F (36.8 °C), temperature source Temporal, resp. rate 16, height 6' 2\" (1.88 m), weight 250 lb 1.6 oz (113.4 kg), SpO2 97 %. Subjective:  Symptoms:  Stable. He reports weakness. Diet:  Adequate intake. Activity level: Impaired due to weakness. Pain:  He complains of pain that is moderate. Objective:  General Appearance: In no acute distress. Vital signs: (most recent): Blood pressure (!) 140/74, pulse 62, temperature 98.3 °F (36.8 °C), temperature source Temporal, resp. rate 16, height 6' 2\" (1.88 m), weight 250 lb 1.6 oz (113.4 kg), SpO2 97 %. Vital signs are normal.    Output: Producing urine and producing stool. Lungs:  Normal effort and normal respiratory rate. Breath sounds clear to auscultation. Heart: Normal rate. Regular rhythm. S1 normal and S2 normal.    Abdomen: Abdomen is soft. Bowel sounds are normal.   There is no abdominal tenderness. Extremities: Decreased range of motion. There is deformity. Neurological: Patient is alert. Assessment:    Condition: In stable condition. Improving. (Right above-knee amputation). Plan:   Encourage ambulation.   (Switch to Lyrica for control of phantom pain  Tolerating therapy well  Will

## 2020-06-09 NOTE — PATIENT CARE CONFERENCE
transfers goal:  standby assist  Long term Chair/bed transfers goal: Modified independent      Ambulation:   Current level: 7 ft baritric wheeled walker at min assist  Short term ambulation goal: 10 ft. with a bariatric wheeled walker at standby assist  Long term ambulation goal: 50 at supervision    Wheelchair Mobility:  Current level: 150 ft standby assist-supervision  Short term wheelchair goal: met  Long term wheelchair goal: 300 ft.  at Modified independent      Car transfers:   Current level: to be assessed    Stairs:   Current level : to be assessed      Other comments: working on stretching exercises for right hip contracture    OCCUPATIONAL THERAPY:      Tub/shower:   Current level: to be assessed    Feeding:  Current level: supervision  Short term feeding goal: Modified independent  Long term feeding goal: independent    Grooming:   Current level: supervision  Short term grooming goal: Modified independent  Long term grooming goal: Modified independent    Bathing:  Current level: mod assist  Short term bathing goal: min assist  Long term bathing goal: standby assist    Homemaking:   Current level: to be assessed    Upper body dressing:  Current level: supervision  Short term upper body dressing goal: Modified independent  Long term upper body dressing goal: independent    Lower body dressing:  Current level: max assist  Short term lower body dressing goal: mod assist  Long term lower body dressing goal: supervision    Toilet transfer:   Current level: mod assist for pivot with wheeled walker to bariatric commode  Short term toilet transfer goal: min assist  Long term toilet transfer goal: standby assist    Safety awareness: fair      Patient/family's personal goals: \"get out of here\"  Factors supporting goal achievement:  has good support services in place  Factors hindering goal achievement:  right residual limb contracture, pain      Discharge Plan   Estimated Length of Stay: 2 weeks

## 2020-06-09 NOTE — PROGRESS NOTES
OCCUPATIONAL THERAPY DAILY NOTE    Date:2020  Patient Name: Jacques Alarcon  MRN: [id number]  : 1955  Room: [address] [place] [address]     Referring Practitioner: Akil James MD  Diagnosis: Monserrat Amairani- RLE I&D 2020; removeal of muscle RLE 2020  Additional Pertinent Hx: DM, CKD, HTN, HLD, RAGHU & Obesity    Precautions:  Fall risk, R AKA     Functional Assessment:   Date Status AE  Comments   Feeding 20  setup      Grooming 2020 Supervision  Bed level      Bathing 20 UE bathing:SBA   LE bathing: MOD A      UB Dressing 20 SBA      LB Dressing 20 MAX A      Homemaking         Functional Transfers / Balance:   Date Status DME  Comments   Sit Balance 20  supervision       Stand Balance 20 Min A x2   W/w  Std walker     [] Tub  [] Shower   Transfer 20 TBD      Commode   Transfer 20 MOD A  Drop arm bariatric BSC     Functional   Mobility 20 MIN A       MIN A  W/w      w/c       Throughout rehab unit on various surfaces requiring assist with navigating around objects and tight spaces; Other: supine to EOB       Sit<>stand  20 Min A        MIN A          High/low table                 Functional Exercises / Activity:  Pt engaged in leisure activity from w/c level ladder ball focusing on UE AROM, and endurance to increase independence with ADL/IADL tasks; fair results with pt completing activity without c/o fatigue. Sensory / Neuromuscular Re-Education:      Cognitive Skills:   Status Comments   Problem   Solving fair  Demo during ADL;s, standing balance, bed>w/c transfers using std. Walker. Memory fair  \"   Sequencing fair  \"   Safety fair  \"     Visual Perception:    Education:  Pt educated with regards to safety during EOB to w/c transfers using std. Walker and hopping over method for one legged skills.  .     [] Family teach completed on:    Pain Level: pt having c/o pain in R LE not quantified nursing notified      Additional Notes:   20 Pt

## 2020-06-09 NOTE — PROGRESS NOTES
Physical Therapy    Facility/Department: [address] REHAB  Treatment Note   NAME: Mayito Zayas  : 1955  MRN: [id number]    Date of Service: 2020     Evaluating Therapist: Fabio Alexander PT, DPT    ROOM: [facility]  DIAGNOSIS: R AKA  PRECAUTIONS: falls, R AKA  HPI: Pt has hx AKA in 2018. Pt admitted to [facility] due to residual limb infection, underwent R Leg I&D 2020,   R Thigh Wound Dressing Change, Debridement, Removal of Muscle 2020,   Excisional debridement of the infected necrotic wound right groin and right thigh 2020. [facility] Social:  Pt lives with alone in a 1 floor plan 1 steps without rails to enter. Pt reports having HH aide present x 3 hrs daily to assist with homemaking/ADLs  Prior to admission: Pt ambulated short distances with [address] and R prosthetic, reports having WC and performing majority of ADLs/activities in home at R [facility] 23 level     Initial Evaluation  20 AM     PM  Short Term Goals Long Term Goals    Was pt agreeable to Eval/treatment? Yes yes See comments     Does pt have pain?  Pt c/o significant pain at R residual limb Minimal  R residual limb pain       Bed Mobility  Rolling: Mila  Supine to sit: Mila  Sit to supine: Mila  Scootin [address] bed   Supine to sit min A  SBA Modified Independent   Transfers Sit to stand: ModA from raised EOM  Stand to sit: ModA  Stand pivot: ModA with Bariatric [address]    Sliding board transfer Mila from Juan [address]ab table Sit to stand:CGA  Stand to sit: CGA  Stand pivot with standard walker with min/cga    SBA Modified Independent   Ambulation    TBA 10-20 feet  with bariatric ww min A ( wc follow)   10 feet with bariatric WW with SBA 50 feet with Bariatric WW with Supervision   Wheel Chair Mobility 150 feet with BUE with -150 feet with B UE with SB/supervision        Car Transfers TBA N/T  Mila SBA   Stair negotiation: ascended and descended TBA N/T  1 step with 2 rails with Mila 2 steps with 2 rail

## 2020-06-09 NOTE — CARE COORDINATION
Team:  Team meeting this date. Progress and plan reviewed with patient at bedside. He verbalized good understanding and states he will update his girlfriend and family. Team estimates LOS at 2 weeks. Patient is at min assist levels with goals to modified independence. Patient has not  been home since early March. NIKKI updated patient address  [address] [place] [address] [address] [place] [address]. D/C plan and needs as noted below. DC plan:  Home alone with intermittent support from friend. Patient has Waiver home care services 14 hours per week. .  This date NIKKI spoke to care manager Carver Kanner  [phone]. Informed her of anticipate discharge and inquired as to ability to increase hours at discharge. She stated that additional hours can be approved but that determination is made by [facility]. Her number is [phone]. Mallika will update [facility] re the plan and will request that she call us closer to discharge to discuss needs. SW to follow. Mallika also reports that she will work to extend the patients 90 eligibility period based on this conversation. DME  Patient has wheelchair  , transfer bench, and walker. He questions need for a hospital bed.   Will follow    FTE  Closer to discharge

## 2020-06-10 LAB
METER GLUCOSE: 125 MG/DL (ref 74–99)
METER GLUCOSE: 142 MG/DL (ref 74–99)
METER GLUCOSE: 161 MG/DL (ref 74–99)
METER GLUCOSE: 208 MG/DL (ref 74–99)

## 2020-06-10 PROCEDURE — 6370000000 HC RX 637 (ALT 250 FOR IP): Performed by: PHYSICAL MEDICINE & REHABILITATION

## 2020-06-10 PROCEDURE — 2580000003 HC RX 258: Performed by: PHYSICAL MEDICINE & REHABILITATION

## 2020-06-10 PROCEDURE — 6360000002 HC RX W HCPCS: Performed by: PHYSICAL MEDICINE & REHABILITATION

## 2020-06-10 PROCEDURE — 97535 SELF CARE MNGMENT TRAINING: CPT

## 2020-06-10 PROCEDURE — 1280000000 HC REHAB R&B

## 2020-06-10 PROCEDURE — 97530 THERAPEUTIC ACTIVITIES: CPT

## 2020-06-10 PROCEDURE — 97110 THERAPEUTIC EXERCISES: CPT

## 2020-06-10 PROCEDURE — 82962 GLUCOSE BLOOD TEST: CPT

## 2020-06-10 RX ORDER — GABAPENTIN 400 MG/1
400 CAPSULE ORAL 3 TIMES DAILY
Status: DISCONTINUED | OUTPATIENT
Start: 2020-06-10 | End: 2020-06-23 | Stop reason: HOSPADM

## 2020-06-10 RX ADMIN — CILOSTAZOL 50 MG: 50 TABLET ORAL at 21:15

## 2020-06-10 RX ADMIN — PETROLATUM: 42 OINTMENT TOPICAL at 21:17

## 2020-06-10 RX ADMIN — OXYCODONE 5 MG: 5 TABLET ORAL at 12:20

## 2020-06-10 RX ADMIN — METOPROLOL TARTRATE 25 MG: 25 TABLET, FILM COATED ORAL at 21:15

## 2020-06-10 RX ADMIN — ENOXAPARIN SODIUM 40 MG: 40 INJECTION SUBCUTANEOUS at 07:48

## 2020-06-10 RX ADMIN — INSULIN LISPRO 2 UNITS: 100 INJECTION, SOLUTION INTRAVENOUS; SUBCUTANEOUS at 11:01

## 2020-06-10 RX ADMIN — CLONIDINE HYDROCHLORIDE 0.1 MG: 0.1 TABLET ORAL at 21:14

## 2020-06-10 RX ADMIN — SODIUM CHLORIDE, PRESERVATIVE FREE 300 UNITS: 5 INJECTION INTRAVENOUS at 20:43

## 2020-06-10 RX ADMIN — PETROLATUM: 42 OINTMENT TOPICAL at 08:03

## 2020-06-10 RX ADMIN — SODIUM CHLORIDE, PRESERVATIVE FREE 300 UNITS: 5 INJECTION INTRAVENOUS at 10:47

## 2020-06-10 RX ADMIN — CLONIDINE HYDROCHLORIDE 0.1 MG: 0.1 TABLET ORAL at 07:49

## 2020-06-10 RX ADMIN — Medication 10 ML: at 20:43

## 2020-06-10 RX ADMIN — Medication 10 ML: at 10:46

## 2020-06-10 RX ADMIN — OXYCODONE 5 MG: 5 TABLET ORAL at 21:15

## 2020-06-10 RX ADMIN — ATORVASTATIN CALCIUM 10 MG: 10 TABLET, FILM COATED ORAL at 07:49

## 2020-06-10 RX ADMIN — ENOXAPARIN SODIUM 40 MG: 40 INJECTION SUBCUTANEOUS at 21:15

## 2020-06-10 RX ADMIN — CILOSTAZOL 50 MG: 50 TABLET ORAL at 07:49

## 2020-06-10 RX ADMIN — METOPROLOL TARTRATE 25 MG: 25 TABLET, FILM COATED ORAL at 07:48

## 2020-06-10 RX ADMIN — PREGABALIN 150 MG: 150 CAPSULE ORAL at 07:57

## 2020-06-10 RX ADMIN — MICONAZOLE NITRATE: 20.6 POWDER TOPICAL at 21:16

## 2020-06-10 RX ADMIN — AMLODIPINE BESYLATE 5 MG: 5 TABLET ORAL at 07:49

## 2020-06-10 RX ADMIN — DULOXETINE HYDROCHLORIDE 120 MG: 60 CAPSULE, DELAYED RELEASE ORAL at 07:49

## 2020-06-10 RX ADMIN — INSULIN LISPRO 2 UNITS: 100 INJECTION, SOLUTION INTRAVENOUS; SUBCUTANEOUS at 21:19

## 2020-06-10 RX ADMIN — OXYCODONE 5 MG: 5 TABLET ORAL at 08:00

## 2020-06-10 RX ADMIN — GABAPENTIN 400 MG: 400 CAPSULE ORAL at 21:14

## 2020-06-10 ASSESSMENT — PAIN DESCRIPTION - DESCRIPTORS
DESCRIPTORS: ACHING;CONSTANT;THROBBING
DESCRIPTORS: ACHING;CONSTANT;DISCOMFORT
DESCRIPTORS: ACHING;CONSTANT;DISCOMFORT

## 2020-06-10 ASSESSMENT — PAIN SCALES - GENERAL
PAINLEVEL_OUTOF10: 4
PAINLEVEL_OUTOF10: 8
PAINLEVEL_OUTOF10: 7
PAINLEVEL_OUTOF10: 0
PAINLEVEL_OUTOF10: 6
PAINLEVEL_OUTOF10: 0
PAINLEVEL_OUTOF10: 0

## 2020-06-10 ASSESSMENT — PAIN DESCRIPTION - LOCATION
LOCATION: HIP;LEG
LOCATION: HIP;LEG
LOCATION: HIP

## 2020-06-10 ASSESSMENT — PAIN - FUNCTIONAL ASSESSMENT
PAIN_FUNCTIONAL_ASSESSMENT: PREVENTS OR INTERFERES SOME ACTIVE ACTIVITIES AND ADLS

## 2020-06-10 ASSESSMENT — PAIN DESCRIPTION - PROGRESSION
CLINICAL_PROGRESSION: NOT CHANGED

## 2020-06-10 ASSESSMENT — PAIN DESCRIPTION - ONSET
ONSET: ON-GOING

## 2020-06-10 ASSESSMENT — PAIN DESCRIPTION - PAIN TYPE
TYPE: ACUTE PAIN;SURGICAL PAIN
TYPE: ACUTE PAIN;SURGICAL PAIN

## 2020-06-10 ASSESSMENT — PAIN DESCRIPTION - FREQUENCY
FREQUENCY: CONTINUOUS
FREQUENCY: INTERMITTENT
FREQUENCY: CONTINUOUS

## 2020-06-10 ASSESSMENT — PAIN DESCRIPTION - ORIENTATION
ORIENTATION: RIGHT

## 2020-06-10 NOTE — PROGRESS NOTES
inch platform step ( chair on step) and sit down in chair. Pt reported that will not work. Recommend pt to have assistance with enter/existing home. AM  Time in: 9:15  Time out: 10:00    Time in 1345  Time out 1430    Pt is making fair progress toward established Physical Therapy goals. Continue with physical therapy current plan of care.     Samantha Ferris [id number]

## 2020-06-10 NOTE — PROGRESS NOTES
Mayte Murphy is a 60 y.o. male patient.     Current Facility-Administered Medications   Medication Dose Route Frequency Provider Last Rate Last Dose    gabapentin (NEURONTIN) capsule 400 mg  400 mg Oral TID Cindy Winkler MD        oxyCODONE (ROXICODONE) immediate release tablet 5 mg  5 mg Oral Q4H PRN Cindy Winkler MD   5 mg at 06/10/20 0800    mineral oil-hydrophilic petrolatum (HYDROPHOR) ointment   Topical BID Mil Blanco DO        glucose (GLUTOSE) 40 % oral gel 15 g  15 g Oral PRN Cindy Winkler MD        dextrose 50 % IV solution  12.5 g Intravenous PRN Cindy Winkler MD        glucagon (rDNA) injection 1 mg  1 mg Intramuscular PRN Cindy Winkler MD        dextrose 5 % solution  100 mL/hr Intravenous PRN Cindy Winkler MD        acetaminophen (TYLENOL) tablet 650 mg  650 mg Oral Q4H PRN Cindy Winkler MD        polyethylene glycol (GLYCOLAX) packet 17 g  17 g Oral Daily PRN Cindy Winkler MD        acetaminophen (TYLENOL) tablet 650 mg  650 mg Oral Q6H PRN Cindy Winkler MD        amLODIPine (NORVASC) tablet 5 mg  5 mg Oral Daily Eddie A MD Patricia   5 mg at 06/10/20 0749    atorvastatin (LIPITOR) tablet 10 mg  10 mg Oral Daily Eddie A MD Patricia   10 mg at 06/10/20 0749    bisacodyl (DULCOLAX) EC tablet 5 mg  5 mg Oral BID Cindy Winkler MD   5 mg at 06/09/20 2126    cilostazol (PLETAL) tablet 50 mg  50 mg Oral BID Cindy Winkler MD   50 mg at 06/10/20 0749    cloNIDine (CATAPRES) tablet 0.1 mg  0.1 mg Oral BID Cindy Winkler MD   0.1 mg at 06/10/20 0749    DULoxetine (CYMBALTA) extended release capsule 120 mg  120 mg Oral Daily Eddie A MD Patricia   120 mg at 06/10/20 0749    enoxaparin (LOVENOX) injection 40 mg  40 mg Subcutaneous BID Cindy Winkler MD   40 mg at 06/10/20 0748    insulin lispro (HUMALOG) injection vial 0-12 Units  0-12 Units Subcutaneous TID WC Cindy Winkler MD   4 Units at 06/09/20 1125    insulin lispro (HUMALOG) injection vial 0-6 Units  0-6 Units Subcutaneous Nightly Fernando Ramos MD   3 Units at 06/09/20 2126    metoprolol tartrate (LOPRESSOR) tablet 25 mg  25 mg Oral BID Fernando Ramos MD   25 mg at 06/10/20 0748    miconazole (MICOTIN) 2 % powder   Topical BID Fernando Ramos MD        heparin flush 100 UNIT/ML injection 300 Units  300 Units Intracatheter BID Fernando Ramos MD   300 Units at 06/09/20 2126    sodium chloride flush 0.9 % injection 10 mL  10 mL Intravenous 2 times per day Fernando Ramos MD   10 mL at 06/09/20 2126     No Known Allergies  Active Problems:    Above knee amputation of right lower extremity ([address]  Resolved Problems:    * No resolved hospital problems. *    Blood pressure (!) 140/81, pulse 62, temperature 98.6 °F (37 °C), temperature source Temporal, resp. rate 18, height 6' 2\" (1.88 m), weight 250 lb 1.6 oz (113.4 kg), SpO2 95 %. Subjective:  Symptoms:  Stable. He reports weakness. Diet:  Adequate intake. Activity level: Impaired due to pain. Pain:  He complains of pain that is moderate. Objective:  General Appearance: In no acute distress. Vital signs: (most recent): Blood pressure (!) 140/81, pulse 62, temperature 98.6 °F (37 °C), temperature source Temporal, resp. rate 18, height 6' 2\" (1.88 m), weight 250 lb 1.6 oz (113.4 kg), SpO2 95 %. Vital signs are normal.    Output: Producing urine and producing stool. Lungs:  Normal effort and normal respiratory rate. Breath sounds clear to auscultation. Heart: Normal rate. Regular rhythm. S1 normal and S2 normal.    Abdomen: Abdomen is soft. Bowel sounds are normal.   There is no abdominal tenderness. Extremities: Decreased range of motion. Neurological: Patient is alert.     Functional Assessment:      Date   Status   AE    Comments     Feeding   6/5/20    setup              Grooming   6/6/2020   Supervision    Bed level          Bathing   6/6/20   UE bathing:SBA     LE bathing: MOD A            UB Dressing

## 2020-06-11 PROBLEM — I82.549: Status: ACTIVE | Noted: 2020-05-04

## 2020-06-11 PROBLEM — Z01.21 ENCOUNTER FOR DENTAL EXAMINATION AND CLEANING WITH ABNORMAL FINDINGS: Status: ACTIVE | Noted: 2018-04-02

## 2020-06-11 PROBLEM — N17.9 ACUTE KIDNEY INJURY (HCC): Status: ACTIVE | Noted: 2020-05-18

## 2020-06-11 PROBLEM — G89.18 POSTOPERATIVE PAIN: Status: ACTIVE | Noted: 2020-06-11

## 2020-06-11 LAB
METER GLUCOSE: 111 MG/DL (ref 74–99)
METER GLUCOSE: 141 MG/DL (ref 74–99)
METER GLUCOSE: 209 MG/DL (ref 74–99)
METER GLUCOSE: 286 MG/DL (ref 74–99)

## 2020-06-11 PROCEDURE — 97110 THERAPEUTIC EXERCISES: CPT

## 2020-06-11 PROCEDURE — 97530 THERAPEUTIC ACTIVITIES: CPT

## 2020-06-11 PROCEDURE — 82962 GLUCOSE BLOOD TEST: CPT

## 2020-06-11 PROCEDURE — 1280000000 HC REHAB R&B

## 2020-06-11 PROCEDURE — 6360000002 HC RX W HCPCS: Performed by: PHYSICAL MEDICINE & REHABILITATION

## 2020-06-11 PROCEDURE — 6370000000 HC RX 637 (ALT 250 FOR IP): Performed by: PHYSICAL MEDICINE & REHABILITATION

## 2020-06-11 PROCEDURE — 97535 SELF CARE MNGMENT TRAINING: CPT

## 2020-06-11 PROCEDURE — 2580000003 HC RX 258: Performed by: PHYSICAL MEDICINE & REHABILITATION

## 2020-06-11 RX ORDER — OXYCODONE HYDROCHLORIDE 10 MG/1
10 TABLET ORAL EVERY 4 HOURS PRN
Status: DISCONTINUED | OUTPATIENT
Start: 2020-06-11 | End: 2020-06-11 | Stop reason: SDUPTHER

## 2020-06-11 RX ORDER — LIDOCAINE 4 G/G
1 PATCH TOPICAL DAILY
Status: DISCONTINUED | OUTPATIENT
Start: 2020-06-11 | End: 2020-06-23 | Stop reason: HOSPADM

## 2020-06-11 RX ORDER — HYDROCODONE BITARTRATE AND ACETAMINOPHEN 5; 325 MG/1; MG/1
1 TABLET ORAL EVERY 4 HOURS PRN
Qty: 120 TABLET | Refills: 0 | Status: SHIPPED
Start: 2020-06-11 | End: 2020-06-23 | Stop reason: HOSPADM

## 2020-06-11 RX ORDER — OXYCODONE HYDROCHLORIDE 5 MG/1
5 TABLET ORAL EVERY 4 HOURS PRN
Status: DISCONTINUED | OUTPATIENT
Start: 2020-06-11 | End: 2020-06-23 | Stop reason: HOSPADM

## 2020-06-11 RX ORDER — OXYCODONE HYDROCHLORIDE 10 MG/1
10 TABLET ORAL EVERY 4 HOURS PRN
Status: DISCONTINUED | OUTPATIENT
Start: 2020-06-11 | End: 2020-06-23 | Stop reason: HOSPADM

## 2020-06-11 RX ORDER — OXYCODONE 13.5 MG/1
13.5 CAPSULE, EXTENDED RELEASE ORAL 2 TIMES DAILY
Qty: 60 EACH | Refills: 0 | Status: SHIPPED
Start: 2020-06-11 | End: 2020-06-23 | Stop reason: HOSPADM

## 2020-06-11 RX ORDER — OXYCODONE HYDROCHLORIDE 5 MG/1
5 TABLET ORAL EVERY 4 HOURS PRN
Status: DISCONTINUED | OUTPATIENT
Start: 2020-06-11 | End: 2020-06-11 | Stop reason: SDUPTHER

## 2020-06-11 RX ADMIN — PETROLATUM: 42 OINTMENT TOPICAL at 08:15

## 2020-06-11 RX ADMIN — GABAPENTIN 400 MG: 400 CAPSULE ORAL at 21:01

## 2020-06-11 RX ADMIN — CILOSTAZOL 50 MG: 50 TABLET ORAL at 08:10

## 2020-06-11 RX ADMIN — AMLODIPINE BESYLATE 5 MG: 5 TABLET ORAL at 08:10

## 2020-06-11 RX ADMIN — ENOXAPARIN SODIUM 40 MG: 40 INJECTION SUBCUTANEOUS at 21:01

## 2020-06-11 RX ADMIN — OXYCODONE HYDROCHLORIDE 10 MG: 10 TABLET ORAL at 12:10

## 2020-06-11 RX ADMIN — INSULIN LISPRO 4 UNITS: 100 INJECTION, SOLUTION INTRAVENOUS; SUBCUTANEOUS at 11:02

## 2020-06-11 RX ADMIN — Medication 10 ML: at 21:03

## 2020-06-11 RX ADMIN — CLONIDINE HYDROCHLORIDE 0.1 MG: 0.1 TABLET ORAL at 08:09

## 2020-06-11 RX ADMIN — DULOXETINE HYDROCHLORIDE 120 MG: 60 CAPSULE, DELAYED RELEASE ORAL at 08:11

## 2020-06-11 RX ADMIN — SODIUM CHLORIDE, PRESERVATIVE FREE 300 UNITS: 5 INJECTION INTRAVENOUS at 08:27

## 2020-06-11 RX ADMIN — OXYCODONE HYDROCHLORIDE 10 MG: 10 TABLET ORAL at 21:31

## 2020-06-11 RX ADMIN — PETROLATUM: 42 OINTMENT TOPICAL at 21:02

## 2020-06-11 RX ADMIN — GABAPENTIN 400 MG: 400 CAPSULE ORAL at 16:29

## 2020-06-11 RX ADMIN — METOPROLOL TARTRATE 25 MG: 25 TABLET, FILM COATED ORAL at 08:10

## 2020-06-11 RX ADMIN — ATORVASTATIN CALCIUM 10 MG: 10 TABLET, FILM COATED ORAL at 08:11

## 2020-06-11 RX ADMIN — GABAPENTIN 400 MG: 400 CAPSULE ORAL at 08:10

## 2020-06-11 RX ADMIN — CILOSTAZOL 50 MG: 50 TABLET ORAL at 21:01

## 2020-06-11 RX ADMIN — MICONAZOLE NITRATE: 20.6 POWDER TOPICAL at 21:02

## 2020-06-11 RX ADMIN — INSULIN LISPRO 3 UNITS: 100 INJECTION, SOLUTION INTRAVENOUS; SUBCUTANEOUS at 21:03

## 2020-06-11 RX ADMIN — METOPROLOL TARTRATE 25 MG: 25 TABLET, FILM COATED ORAL at 21:01

## 2020-06-11 RX ADMIN — ENOXAPARIN SODIUM 40 MG: 40 INJECTION SUBCUTANEOUS at 08:09

## 2020-06-11 RX ADMIN — SODIUM CHLORIDE, PRESERVATIVE FREE 300 UNITS: 5 INJECTION INTRAVENOUS at 21:03

## 2020-06-11 RX ADMIN — OXYCODONE 5 MG: 5 TABLET ORAL at 08:10

## 2020-06-11 RX ADMIN — CLONIDINE HYDROCHLORIDE 0.1 MG: 0.1 TABLET ORAL at 21:01

## 2020-06-11 RX ADMIN — Medication 10 ML: at 08:27

## 2020-06-11 ASSESSMENT — PAIN DESCRIPTION - FREQUENCY
FREQUENCY: CONTINUOUS
FREQUENCY: CONTINUOUS

## 2020-06-11 ASSESSMENT — PAIN SCALES - GENERAL
PAINLEVEL_OUTOF10: 8
PAINLEVEL_OUTOF10: 2
PAINLEVEL_OUTOF10: 7
PAINLEVEL_OUTOF10: 0
PAINLEVEL_OUTOF10: 8
PAINLEVEL_OUTOF10: 7
PAINLEVEL_OUTOF10: 6
PAINLEVEL_OUTOF10: 0
PAINLEVEL_OUTOF10: 0

## 2020-06-11 ASSESSMENT — PAIN DESCRIPTION - DESCRIPTORS
DESCRIPTORS: ACHING;DISCOMFORT

## 2020-06-11 ASSESSMENT — PAIN DESCRIPTION - LOCATION
LOCATION: HIP

## 2020-06-11 ASSESSMENT — PAIN DESCRIPTION - PROGRESSION
CLINICAL_PROGRESSION: NOT CHANGED
CLINICAL_PROGRESSION: NOT CHANGED

## 2020-06-11 ASSESSMENT — PAIN DESCRIPTION - ORIENTATION
ORIENTATION: RIGHT

## 2020-06-11 ASSESSMENT — PAIN DESCRIPTION - PAIN TYPE
TYPE: ACUTE PAIN;SURGICAL PAIN

## 2020-06-11 ASSESSMENT — PAIN DESCRIPTION - ONSET
ONSET: ON-GOING
ONSET: ON-GOING

## 2020-06-11 NOTE — PROGRESS NOTES
UE with supervision   NT     Car Transfers TBA N/T NT Mila SBA   Stair negotiation: ascended and descended TBA N/T NT 1 step with 2 rails with Mila 2 steps with 2 rail with SBA   Curb Step:   ascended and descended TBA N/T 7.5i inch step with bariatric ww with min A ( see comments)  2 inch step with AAD and Mila 7.5 inch step with AAD and SBA   BLE ROM LLE WNL  R residual limb exhibits ~30 degree hip flexion        BLE Strength LLE grossly 4/5         Balance  Static and dynamic standing balance ModA with bariatric WW modA dynamic with bariatric ww. Date Family Teach Completed        Is additional Family Teaching Needed? Y or N        Hindering Progress Debility, R residual limb contracture, pain       PT recommended ELOS 3-4 weeks       Team's Discharge Plan        Therapist at Team Meeting          Therapeutic Exercise:   AM:  Sit <>stand x5 reps    PM  Side lying hip flexion/extension x10  Side lying hip abduction x10        Patient education  Pt educated  Importance of stretching R residual limb     Patient response to education:   Pt verbalized understanding Pt demonstrated skill Pt requires further education in this area   yes  x     Additional Comments: Increased time with all functional mobility. Discussed with pt about stair negotiation at home. Pt reported one step to enter/exist home. Pt reported that he would be willing to attempt negotiating step with stand pivoting with bariatric ww and back up to step ( with chair on step ) and sit in wc. Will attempt technique this pm.  Recommend pt to have assistance with stair negotiation at home and pt in agreement. PM  Pt completed stair negotiation of 7.5 inch step with bariatric ww ( with chair placed on step ) and pivoted/backed up to step and sat in chair. Continue to recommend assistance when pt enters/exists home. Much time spent on educating pt on importance of stretching R residual limb.   Decreased tolerance to stretching of R residual limb due to pain. Discussed lying supine in room to promote hip extension. AM  Time in: 9:15  Time out: 10:00    Time in 1345  Time out 1430    Pt is making fair progress toward established Physical Therapy goals. Continue with physical therapy current plan of care.     Gus Andrade [id number]

## 2020-06-11 NOTE — PROGRESS NOTES
Kalie Mosqueda is a 60 y.o. male patient.     Current Facility-Administered Medications   Medication Dose Route Frequency Provider Last Rate Last Dose    lidocaine 4 % external patch 1 patch  1 patch Transdermal Daily Dillon Fuentes MD        oxyCODONE (ROXICODONE) immediate release tablet 5 mg  5 mg Oral Q4H PRN Dillon Fuentes MD        Or    oxyCODONE HCl (OXY-IR) immediate release tablet 10 mg  10 mg Oral Q4H PRN Dillon Fuentes MD        gabapentin (NEURONTIN) capsule 400 mg  400 mg Oral TID Dillon Fuentes MD   400 mg at 06/11/20 0810    mineral oil-hydrophilic petrolatum (HYDROPHOR) ointment   Topical BID Mil Blanco DO        glucose (GLUTOSE) 40 % oral gel 15 g  15 g Oral PRN Dillon Fuentes MD        dextrose 50 % IV solution  12.5 g Intravenous PRN Dillon Fuenets MD        glucagon (rDNA) injection 1 mg  1 mg Intramuscular PRN Dillon Fuentes MD        dextrose 5 % solution  100 mL/hr Intravenous VALERIA Fuentes MD        acetaminophen (TYLENOL) tablet 650 mg  650 mg Oral Q4H PRN Dillon Fuentes MD        polyethylene glycol (GLYCOLAX) packet 17 g  17 g Oral Daily PRN Dillon Fuentes MD        acetaminophen (TYLENOL) tablet 650 mg  650 mg Oral Q6H PRN Dillon Fuentes MD        amLODIPine (NORVASC) tablet 5 mg  5 mg Oral Daily Eddie Gomez MD   5 mg at 06/11/20 0810    atorvastatin (LIPITOR) tablet 10 mg  10 mg Oral Daily Diloln Fuentes MD   10 mg at 06/11/20 0811    bisacodyl (DULCOLAX) EC tablet 5 mg  5 mg Oral BID Dillon Fuentes MD   5 mg at 06/09/20 2126    cilostazol (PLETAL) tablet 50 mg  50 mg Oral BID Dillon Fuentes MD   50 mg at 06/11/20 0810    cloNIDine (CATAPRES) tablet 0.1 mg  0.1 mg Oral BID Dillon Fuentes MD   0.1 mg at 06/11/20 0809    DULoxetine (CYMBALTA) extended release capsule 120 mg  120 mg Oral Daily Eddie A MD Patricia   120 mg at 06/11/20 0811    enoxaparin (LOVENOX) injection 40 mg  40 mg Subcutaneous BID Dillon Stain, MD 40 mg at 06/11/20 0809    insulin lispro (HUMALOG) injection vial 0-12 Units  0-12 Units Subcutaneous TID  Eddie Gomez MD   2 Units at 06/10/20 1101    insulin lispro (HUMALOG) injection vial 0-6 Units  0-6 Units Subcutaneous Nightly Fernando Ramos MD   2 Units at 06/10/20 2119    metoprolol tartrate (LOPRESSOR) tablet 25 mg  25 mg Oral BID Fernando Ramos MD   25 mg at 06/11/20 0810    miconazole (MICOTIN) 2 % powder   Topical BID Fernando Ramos MD        heparin flush 100 UNIT/ML injection 300 Units  300 Units Intracatheter BID Fernando Ramos MD   300 Units at 06/11/20 0827    sodium chloride flush 0.9 % injection 10 mL  10 mL Intravenous 2 times per day Fernando Ramos MD   10 mL at 06/11/20 0827     No Known Allergies  Active Problems:    Above knee amputation of right lower extremity ([address]  Resolved Problems:    * No resolved hospital problems. *    Blood pressure (!) 156/72, pulse 59, temperature 98.6 °F (37 °C), temperature source Oral, resp. rate 18, height 6' 2\" (1.88 m), weight 250 lb 1.6 oz (113.4 kg), SpO2 95 %. Subjective:  Symptoms:  Stable. He reports weakness. Diet:  Adequate intake. Activity level: Impaired due to pain. Pain:  He complains of pain that is moderate. Objective:  General Appearance: In no acute distress. Vital signs: (most recent): Blood pressure (!) 156/72, pulse 59, temperature 98.6 °F (37 °C), temperature source Oral, resp. rate 18, height 6' 2\" (1.88 m), weight 250 lb 1.6 oz (113.4 kg), SpO2 95 %. Vital signs are normal.    Output: Producing urine and producing stool. Lungs:  Normal effort and normal respiratory rate. Breath sounds clear to auscultation. Heart: Normal rate. Regular rhythm. S1 normal and S2 normal.    Abdomen: Abdomen is soft. Bowel sounds are normal.   There is no abdominal tenderness. Extremities: Decreased range of motion. There is deformity. Neurological: Patient is alert. Assessment:    Condition: In stable condition. (Right above-knee amputation). Plan:   Up to wheel chair and encourage ambulation. (Limited by pain at the right residual limb  I will increase the oxycodone back to 5-10  I will add a Lidoderm patch  I do want to try to limit the amount of narcotic he is using  Working on range of motion of the right residual limb which is improving).        Jonnie Peterson MD  6/11/2020

## 2020-06-12 LAB
METER GLUCOSE: 137 MG/DL (ref 74–99)
METER GLUCOSE: 145 MG/DL (ref 74–99)
METER GLUCOSE: 230 MG/DL (ref 74–99)
METER GLUCOSE: 255 MG/DL (ref 74–99)

## 2020-06-12 PROCEDURE — 97110 THERAPEUTIC EXERCISES: CPT

## 2020-06-12 PROCEDURE — 2580000003 HC RX 258: Performed by: PHYSICAL MEDICINE & REHABILITATION

## 2020-06-12 PROCEDURE — 6360000002 HC RX W HCPCS: Performed by: PHYSICAL MEDICINE & REHABILITATION

## 2020-06-12 PROCEDURE — 82962 GLUCOSE BLOOD TEST: CPT

## 2020-06-12 PROCEDURE — 97530 THERAPEUTIC ACTIVITIES: CPT

## 2020-06-12 PROCEDURE — 6370000000 HC RX 637 (ALT 250 FOR IP): Performed by: PHYSICAL MEDICINE & REHABILITATION

## 2020-06-12 PROCEDURE — 97535 SELF CARE MNGMENT TRAINING: CPT

## 2020-06-12 PROCEDURE — 1280000000 HC REHAB R&B

## 2020-06-12 RX ORDER — TIZANIDINE 4 MG/1
2 TABLET ORAL 3 TIMES DAILY
Status: DISCONTINUED | OUTPATIENT
Start: 2020-06-12 | End: 2020-06-23 | Stop reason: HOSPADM

## 2020-06-12 RX ORDER — OXYCODONE HCL 10 MG/1
10 TABLET, FILM COATED, EXTENDED RELEASE ORAL EVERY 12 HOURS SCHEDULED
Status: DISCONTINUED | OUTPATIENT
Start: 2020-06-12 | End: 2020-06-23 | Stop reason: HOSPADM

## 2020-06-12 RX ADMIN — CLONIDINE HYDROCHLORIDE 0.1 MG: 0.1 TABLET ORAL at 07:29

## 2020-06-12 RX ADMIN — TIZANIDINE 2 MG: 4 TABLET ORAL at 14:29

## 2020-06-12 RX ADMIN — CILOSTAZOL 50 MG: 50 TABLET ORAL at 07:28

## 2020-06-12 RX ADMIN — CILOSTAZOL 50 MG: 50 TABLET ORAL at 21:06

## 2020-06-12 RX ADMIN — Medication 10 ML: at 21:18

## 2020-06-12 RX ADMIN — METOPROLOL TARTRATE 25 MG: 25 TABLET, FILM COATED ORAL at 21:06

## 2020-06-12 RX ADMIN — GABAPENTIN 400 MG: 400 CAPSULE ORAL at 07:25

## 2020-06-12 RX ADMIN — OXYCODONE HYDROCHLORIDE 10 MG: 10 TABLET, FILM COATED, EXTENDED RELEASE ORAL at 08:50

## 2020-06-12 RX ADMIN — SODIUM CHLORIDE, PRESERVATIVE FREE 300 UNITS: 5 INJECTION INTRAVENOUS at 09:08

## 2020-06-12 RX ADMIN — TIZANIDINE 2 MG: 4 TABLET ORAL at 08:50

## 2020-06-12 RX ADMIN — DULOXETINE HYDROCHLORIDE 120 MG: 60 CAPSULE, DELAYED RELEASE ORAL at 07:28

## 2020-06-12 RX ADMIN — OXYCODONE HYDROCHLORIDE 10 MG: 10 TABLET, FILM COATED, EXTENDED RELEASE ORAL at 21:05

## 2020-06-12 RX ADMIN — Medication 10 ML: at 09:08

## 2020-06-12 RX ADMIN — INSULIN LISPRO 6 UNITS: 100 INJECTION, SOLUTION INTRAVENOUS; SUBCUTANEOUS at 11:14

## 2020-06-12 RX ADMIN — ENOXAPARIN SODIUM 40 MG: 40 INJECTION SUBCUTANEOUS at 21:05

## 2020-06-12 RX ADMIN — SODIUM CHLORIDE, PRESERVATIVE FREE 300 UNITS: 5 INJECTION INTRAVENOUS at 21:18

## 2020-06-12 RX ADMIN — PETROLATUM: 42 OINTMENT TOPICAL at 21:11

## 2020-06-12 RX ADMIN — GABAPENTIN 400 MG: 400 CAPSULE ORAL at 21:05

## 2020-06-12 RX ADMIN — METOPROLOL TARTRATE 25 MG: 25 TABLET, FILM COATED ORAL at 07:26

## 2020-06-12 RX ADMIN — TIZANIDINE 2 MG: 4 TABLET ORAL at 21:05

## 2020-06-12 RX ADMIN — AMLODIPINE BESYLATE 5 MG: 5 TABLET ORAL at 07:28

## 2020-06-12 RX ADMIN — ENOXAPARIN SODIUM 40 MG: 40 INJECTION SUBCUTANEOUS at 07:25

## 2020-06-12 RX ADMIN — INSULIN LISPRO 2 UNITS: 100 INJECTION, SOLUTION INTRAVENOUS; SUBCUTANEOUS at 21:05

## 2020-06-12 RX ADMIN — OXYCODONE HYDROCHLORIDE 10 MG: 10 TABLET ORAL at 13:37

## 2020-06-12 RX ADMIN — CLONIDINE HYDROCHLORIDE 0.1 MG: 0.1 TABLET ORAL at 21:06

## 2020-06-12 RX ADMIN — ATORVASTATIN CALCIUM 10 MG: 10 TABLET, FILM COATED ORAL at 07:29

## 2020-06-12 RX ADMIN — GABAPENTIN 400 MG: 400 CAPSULE ORAL at 14:51

## 2020-06-12 RX ADMIN — OXYCODONE HYDROCHLORIDE 10 MG: 10 TABLET ORAL at 07:26

## 2020-06-12 ASSESSMENT — PAIN DESCRIPTION - ORIENTATION
ORIENTATION: RIGHT
ORIENTATION: LEFT
ORIENTATION: RIGHT
ORIENTATION: LEFT

## 2020-06-12 ASSESSMENT — PAIN DESCRIPTION - ONSET
ONSET: ON-GOING

## 2020-06-12 ASSESSMENT — PAIN SCALES - GENERAL
PAINLEVEL_OUTOF10: 3
PAINLEVEL_OUTOF10: 10
PAINLEVEL_OUTOF10: 0
PAINLEVEL_OUTOF10: 4
PAINLEVEL_OUTOF10: 7
PAINLEVEL_OUTOF10: 8
PAINLEVEL_OUTOF10: 8
PAINLEVEL_OUTOF10: 6
PAINLEVEL_OUTOF10: 8

## 2020-06-12 ASSESSMENT — PAIN DESCRIPTION - FREQUENCY
FREQUENCY: CONTINUOUS

## 2020-06-12 ASSESSMENT — PAIN DESCRIPTION - PROGRESSION
CLINICAL_PROGRESSION: NOT CHANGED

## 2020-06-12 ASSESSMENT — PAIN DESCRIPTION - DESCRIPTORS
DESCRIPTORS: ACHING;CONSTANT;DISCOMFORT
DESCRIPTORS: ACHING;DISCOMFORT;CONSTANT

## 2020-06-12 ASSESSMENT — PAIN DESCRIPTION - LOCATION
LOCATION: HIP

## 2020-06-12 ASSESSMENT — PAIN DESCRIPTION - PAIN TYPE
TYPE: ACUTE PAIN;SURGICAL PAIN

## 2020-06-12 NOTE — PROGRESS NOTES
Saira Ballard is a 60 y.o. male patient.     Current Facility-Administered Medications   Medication Dose Route Frequency Provider Last Rate Last Dose    oxyCODONE (OXYCONTIN) extended release tablet 10 mg  10 mg Oral 2 times per day Veronica Islas MD        tiZANidine (ZANAFLEX) tablet 2 mg  2 mg Oral TID Veronica Islas MD        lidocaine 4 % external patch 1 patch  1 patch Transdermal Daily Veronica Islas MD        oxyCODONE (ROXICODONE) immediate release tablet 5 mg  5 mg Oral Q4H PRN Veronica Islas MD        Or    oxyCODONE HCl (OXY-IR) immediate release tablet 10 mg  10 mg Oral Q4H PRN Veronica Islas MD   10 mg at 06/12/20 0726    gabapentin (NEURONTIN) capsule 400 mg  400 mg Oral TID Veronica Islas MD   400 mg at 06/12/20 0725    mineral oil-hydrophilic petrolatum (HYDROPHOR) ointment   Topical BID Mil Blanco DO        glucose (GLUTOSE) 40 % oral gel 15 g  15 g Oral PRN Veronica Islas MD        dextrose 50 % IV solution  12.5 g Intravenous PRN Veronica Islas MD        glucagon (rDNA) injection 1 mg  1 mg Intramuscular PRN Veronica Islas MD        dextrose 5 % solution  100 mL/hr Intravenous PRN Veronica Islas MD        acetaminophen (TYLENOL) tablet 650 mg  650 mg Oral Q4H PRN Veronica Islas MD        polyethylene glycol (GLYCOLAX) packet 17 g  17 g Oral Daily PRN Veronica Islas MD        acetaminophen (TYLENOL) tablet 650 mg  650 mg Oral Q6H PRN Veronica Islas MD        amLODIPine (NORVASC) tablet 5 mg  5 mg Oral Daily Eddie Gomez MD   5 mg at 06/12/20 0728    atorvastatin (LIPITOR) tablet 10 mg  10 mg Oral Daily Veronica Islas MD   10 mg at 06/12/20 0729    bisacodyl (DULCOLAX) EC tablet 5 mg  5 mg Oral BID Veronica Islas MD   5 mg at 06/09/20 2126    cilostazol (PLETAL) tablet 50 mg  50 mg Oral BID Veronica Islas MD   50 mg at 06/12/20 0728    cloNIDine (CATAPRES) tablet 0.1 mg  0.1 mg Oral BID Veronica Islas MD   0.1 mg at 06/12/20 0747   

## 2020-06-12 NOTE — PROGRESS NOTES
Physical Therapy    Facility/Department: [address] REHAB  Treatment Note   NAME: Maxx Palacios  : 1955  MRN: [id number]    Date of Service: 2020     Evaluating Therapist: Melissa Dixon, PT, DPT    [facility]  DIAGNOSIS: R AKA  PRECAUTIONS: falls, R AKA  HPI: Pt has hx AKA in 2018. Pt admitted to [facility] due to residual limb infection, underwent R Leg I&D 2020,   R Thigh Wound Dressing Change, Debridement, Removal of Muscle 2020,   Excisional debridement of the infected necrotic wound right groin and right thigh 2020. [facility] Social:  Pt lives with alone in a 1 floor plan 1 steps without rails to enter. Pt reports having HH aide present x 3 hrs daily to assist with homemaking/ADLs  Prior to admission: Pt ambulated short distances with [employer] and R prosthetic, reports having WC and performing majority of ADLs/activities in home at [facility] level     Initial Evaluation  20 AM     PM  Short Term Goals Long Term Goals    Was pt agreeable to Eval/treatment? Yes yes Yes      Does pt have pain?  Pt c/o significant pain at R residual limb R residual limb \" throbbing\"  pain ( 8/10)  R residual limb pain and reported just got medicated      Bed Mobility  Rolling: Mila  Supine to sit: Mila  Sit to supine: Mila  Scooting: Mila NT NT SBA Modified Independent   Transfers Sit to stand: ModA from raised EOM  Stand to sit: ModA  Stand pivot: ModA with Bariatric [employer]    Sliding board transfer Mila from Juan Jose Schwab table Sit to stand CGA  Stand to sit: CGA  Stand pivot with bariatric ww with CGA   NT SBA Modified Independent   Ambulation    TBA 10 feet x2   with bariatric ww with CGA NT   10 feet with bariatric WW with SBA 50 feet with Bariatric WW with Supervision   Wheel Chair Mobility 150 feet with BUE with  feet with B UE with supervision/independent    NT     Car Transfers TBA N/T NT Mila SBA   Stair negotiation: ascended and descended TBA N/T NT 1 step with 2 rails

## 2020-06-13 LAB
METER GLUCOSE: 152 MG/DL (ref 74–99)
METER GLUCOSE: 191 MG/DL (ref 74–99)
METER GLUCOSE: 204 MG/DL (ref 74–99)
METER GLUCOSE: 215 MG/DL (ref 74–99)

## 2020-06-13 PROCEDURE — 1280000000 HC REHAB R&B

## 2020-06-13 PROCEDURE — 97530 THERAPEUTIC ACTIVITIES: CPT

## 2020-06-13 PROCEDURE — 6360000002 HC RX W HCPCS: Performed by: PHYSICAL MEDICINE & REHABILITATION

## 2020-06-13 PROCEDURE — 82962 GLUCOSE BLOOD TEST: CPT

## 2020-06-13 PROCEDURE — 6370000000 HC RX 637 (ALT 250 FOR IP): Performed by: PHYSICAL MEDICINE & REHABILITATION

## 2020-06-13 PROCEDURE — 2580000003 HC RX 258: Performed by: PHYSICAL MEDICINE & REHABILITATION

## 2020-06-13 PROCEDURE — 97535 SELF CARE MNGMENT TRAINING: CPT

## 2020-06-13 RX ORDER — AMMONIUM LACTATE 12 G/100G
LOTION TOPICAL PRN
Status: DISCONTINUED | OUTPATIENT
Start: 2020-06-13 | End: 2020-06-23 | Stop reason: HOSPADM

## 2020-06-13 RX ADMIN — CLONIDINE HYDROCHLORIDE 0.1 MG: 0.1 TABLET ORAL at 08:35

## 2020-06-13 RX ADMIN — OXYCODONE HYDROCHLORIDE 10 MG: 10 TABLET ORAL at 13:26

## 2020-06-13 RX ADMIN — OXYCODONE HYDROCHLORIDE 10 MG: 10 TABLET, FILM COATED, EXTENDED RELEASE ORAL at 20:32

## 2020-06-13 RX ADMIN — AMLODIPINE BESYLATE 5 MG: 5 TABLET ORAL at 08:37

## 2020-06-13 RX ADMIN — GABAPENTIN 400 MG: 400 CAPSULE ORAL at 13:17

## 2020-06-13 RX ADMIN — CLONIDINE HYDROCHLORIDE 0.1 MG: 0.1 TABLET ORAL at 20:31

## 2020-06-13 RX ADMIN — SODIUM CHLORIDE, PRESERVATIVE FREE 300 UNITS: 5 INJECTION INTRAVENOUS at 09:51

## 2020-06-13 RX ADMIN — PETROLATUM: 42 OINTMENT TOPICAL at 21:32

## 2020-06-13 RX ADMIN — INSULIN LISPRO 2 UNITS: 100 INJECTION, SOLUTION INTRAVENOUS; SUBCUTANEOUS at 11:42

## 2020-06-13 RX ADMIN — CILOSTAZOL 50 MG: 50 TABLET ORAL at 08:37

## 2020-06-13 RX ADMIN — GABAPENTIN 400 MG: 400 CAPSULE ORAL at 20:32

## 2020-06-13 RX ADMIN — Medication 10 ML: at 20:54

## 2020-06-13 RX ADMIN — ENOXAPARIN SODIUM 40 MG: 40 INJECTION SUBCUTANEOUS at 20:31

## 2020-06-13 RX ADMIN — BISACODYL 5 MG: 5 TABLET, COATED ORAL at 20:32

## 2020-06-13 RX ADMIN — INSULIN LISPRO 2 UNITS: 100 INJECTION, SOLUTION INTRAVENOUS; SUBCUTANEOUS at 07:14

## 2020-06-13 RX ADMIN — SODIUM CHLORIDE, PRESERVATIVE FREE 300 UNITS: 5 INJECTION INTRAVENOUS at 20:55

## 2020-06-13 RX ADMIN — CILOSTAZOL 50 MG: 50 TABLET ORAL at 20:31

## 2020-06-13 RX ADMIN — TIZANIDINE 2 MG: 4 TABLET ORAL at 08:36

## 2020-06-13 RX ADMIN — ENOXAPARIN SODIUM 40 MG: 40 INJECTION SUBCUTANEOUS at 08:35

## 2020-06-13 RX ADMIN — METOPROLOL TARTRATE 25 MG: 25 TABLET, FILM COATED ORAL at 20:32

## 2020-06-13 RX ADMIN — DULOXETINE HYDROCHLORIDE 120 MG: 60 CAPSULE, DELAYED RELEASE ORAL at 08:35

## 2020-06-13 RX ADMIN — OXYCODONE HYDROCHLORIDE 10 MG: 10 TABLET, FILM COATED, EXTENDED RELEASE ORAL at 08:41

## 2020-06-13 RX ADMIN — BISACODYL 5 MG: 5 TABLET, COATED ORAL at 08:34

## 2020-06-13 RX ADMIN — TIZANIDINE 2 MG: 4 TABLET ORAL at 20:31

## 2020-06-13 RX ADMIN — Medication 10 ML: at 09:13

## 2020-06-13 RX ADMIN — TIZANIDINE 2 MG: 4 TABLET ORAL at 13:16

## 2020-06-13 RX ADMIN — ATORVASTATIN CALCIUM 10 MG: 10 TABLET, FILM COATED ORAL at 08:37

## 2020-06-13 RX ADMIN — INSULIN LISPRO 4 UNITS: 100 INJECTION, SOLUTION INTRAVENOUS; SUBCUTANEOUS at 16:07

## 2020-06-13 RX ADMIN — METOPROLOL TARTRATE 25 MG: 25 TABLET, FILM COATED ORAL at 08:34

## 2020-06-13 RX ADMIN — INSULIN LISPRO 2 UNITS: 100 INJECTION, SOLUTION INTRAVENOUS; SUBCUTANEOUS at 20:31

## 2020-06-13 RX ADMIN — PETROLATUM: 42 OINTMENT TOPICAL at 09:12

## 2020-06-13 RX ADMIN — GABAPENTIN 400 MG: 400 CAPSULE ORAL at 08:45

## 2020-06-13 RX ADMIN — MICONAZOLE NITRATE: 20.6 POWDER TOPICAL at 09:12

## 2020-06-13 ASSESSMENT — PAIN DESCRIPTION - PAIN TYPE
TYPE: ACUTE PAIN;SURGICAL PAIN

## 2020-06-13 ASSESSMENT — PAIN DESCRIPTION - ONSET
ONSET: ON-GOING
ONSET: ON-GOING

## 2020-06-13 ASSESSMENT — PAIN DESCRIPTION - DESCRIPTORS
DESCRIPTORS: ACHING;CONSTANT;DISCOMFORT
DESCRIPTORS: ACHING;CONSTANT;DISCOMFORT

## 2020-06-13 ASSESSMENT — PAIN DESCRIPTION - PROGRESSION
CLINICAL_PROGRESSION: NOT CHANGED
CLINICAL_PROGRESSION: NOT CHANGED

## 2020-06-13 ASSESSMENT — PAIN DESCRIPTION - FREQUENCY
FREQUENCY: CONTINUOUS
FREQUENCY: CONTINUOUS

## 2020-06-13 ASSESSMENT — PAIN SCALES - GENERAL
PAINLEVEL_OUTOF10: 5
PAINLEVEL_OUTOF10: 3
PAINLEVEL_OUTOF10: 8
PAINLEVEL_OUTOF10: 0
PAINLEVEL_OUTOF10: 8
PAINLEVEL_OUTOF10: 8
PAINLEVEL_OUTOF10: 5

## 2020-06-13 ASSESSMENT — PAIN DESCRIPTION - ORIENTATION
ORIENTATION: LEFT

## 2020-06-13 ASSESSMENT — PAIN - FUNCTIONAL ASSESSMENT: PAIN_FUNCTIONAL_ASSESSMENT: PREVENTS OR INTERFERES SOME ACTIVE ACTIVITIES AND ADLS

## 2020-06-13 ASSESSMENT — PAIN DESCRIPTION - LOCATION
LOCATION: HIP

## 2020-06-13 NOTE — PROGRESS NOTES
Progress Note - covering Dr. Shan Nunez    Subjective/   60y.o. year old male on the rehab unit for right AKA. No new complaints. He is tolerating the rehab program.  I initially saw him returning to the rehab unit with direct therapy from being outside. Objective/   VITALS:  BP (!) 109/54   Pulse 65   Temp 98.6 °F (37 °C) (Oral)   Resp 16   Ht 6' 2\" (1.88 m)   Wt 250 lb 1.6 oz (113.4 kg)   SpO2 98%   BMI 32.11 kg/m²   24HR INTAKE/OUTPUT:      Intake/Output Summary (Last 24 hours) at 6/13/2020 1349  Last data filed at 6/13/2020 0830  Gross per 24 hour   Intake 670 ml   Output 1650 ml   Net -980 ml     Constitutional:  Alert, awake, no apparent distress  Cardiovascular:  S1, S2 without m/r/g   Respiratory:  CTA B without w/r/r   Abdomen: Positive bowel sounds  Ext: Right above-knee amputation. No pitting left LE edema, no left calf tenderness. Neuro: Awake, alert and oriented x3. No tremor. Good sitting balance. No focal weakness in the upper extremities    Functional Level      Date   Status   AE    Comments     Feeding   6/13/20    Independent       Pt able to open packets and containers on breakfast tray. Grooming   6/13/20   Supervision    Seated on EOB   Pt completed face/hand washing and applied deodorant seated on EOB      Bathing   6/13/20   UE bathing:Set up     LE bathing:min A      Pt completed sponge bath seated on EOB. Pt able to wash UB needing assist to reach  below knee/Left foot. UB Dressing   6/13/20   Independent        To oleg t- shirt seated on EOB     LB Dressing   6/13/20   MIN A                 Pt threaded pants on EOB then supine for clothing mgmt over hips at SBA. Pt had difficulty with donning sock and shoe LLE needing min A.     Homemaking   6/12/20   SBA   **has aides to complete homemaking              Functional Transfers / Balance:      Date Status DME  Comments   Sit Balance 6/13/20    supervision    Dyn/static sitting balance on EOB during dressing & bathing tasks. Stand Balance 6/13/20   CGA    Std walker  Pt stood using std. Walker with EOB into w/c transfers using LLE hopping over method. [x]? Tub  []? Shower   Transfer 6/10/20   MIN A   Extended tub bench, s/w      Commode   Transfer 6/11/20   CGA Drop arm bariatric BSC, ww     Functional   Mobility 6/5/20 6/13/20 MIN A         Mod I  W/w        W/c propulsion Pt uses BUE's to propel w/c from room to gym to increase strength/endurance and mobility skillls   Other: supine <> EOB         Sit to stand  6/13/20 6/13/20 CGA           CGA/Makenzie     Bed rail         Std. Walker  Pt demo ability to perform supine <>EOB during ADL;s         Sit to stand from EOB to std. Walker       Functional Exercises / Activity:  Pt engaged in am ADL;s to increase independence with dressing, grooming, bathing, EOB sitting balance and bed to w/c transfers with std.walker.    Sensory / Neuromuscular Re-Education:        Cognitive Skills:    Status Comments   Problem   Solving fair + demo'd during ADL tasks     Memory fair + demo'd during transfers   Sequencing fair + \"   Safety fair + \"           Scheduled Meds:   oxyCODONE  10 mg Oral 2 times per day    tiZANidine  2 mg Oral TID    lidocaine  1 patch Transdermal Daily    gabapentin  400 mg Oral TID    mineral oil-hydrophilic petrolatum   Topical BID    amLODIPine  5 mg Oral Daily    atorvastatin  10 mg Oral Daily    bisacodyl  5 mg Oral BID    cilostazol  50 mg Oral BID    cloNIDine  0.1 mg Oral BID    DULoxetine  120 mg Oral Daily    enoxaparin  40 mg Subcutaneous BID    insulin lispro  0-12 Units Subcutaneous TID     insulin lispro  0-6 Units Subcutaneous Nightly    metoprolol tartrate  25 mg Oral BID    miconazole   Topical BID    heparin flush  300 Units Intracatheter BID    sodium chloride flush  10 mL Intravenous 2 times per day     Continuous Infusions:   dextrose       PRN Meds:ammonium lactate, oxyCODONE **OR** oxyCODONE, ([facility]      Plan/  1. Continue rehab program  2. Continue skin care  3. Lac-Hydrin to the left lower extremity as per nursing  4.  Continue management of multiple medical problems          Stephen Funes MD

## 2020-06-14 LAB
ANION GAP SERPL CALCULATED.3IONS-SCNC: 8 MMOL/L (ref 7–16)
BASOPHILS ABSOLUTE: 0.09 E9/L (ref 0–0.2)
BASOPHILS RELATIVE PERCENT: 0.7 % (ref 0–2)
BUN BLDV-MCNC: 22 MG/DL (ref 8–23)
CALCIUM SERPL-MCNC: 9.1 MG/DL (ref 8.6–10.2)
CHLORIDE BLD-SCNC: 95 MMOL/L (ref 98–107)
CO2: 30 MMOL/L (ref 22–29)
CREAT SERPL-MCNC: 2 MG/DL (ref 0.7–1.2)
EOSINOPHILS ABSOLUTE: 0.32 E9/L (ref 0.05–0.5)
EOSINOPHILS RELATIVE PERCENT: 2.5 % (ref 0–6)
GFR AFRICAN AMERICAN: 41
GFR NON-AFRICAN AMERICAN: 41 ML/MIN/1.73
GLUCOSE BLD-MCNC: 148 MG/DL (ref 74–99)
HCT VFR BLD CALC: 27.9 % (ref 37–54)
HEMOGLOBIN: 8.6 G/DL (ref 12.5–16.5)
IMMATURE GRANULOCYTES #: 0.06 E9/L
IMMATURE GRANULOCYTES %: 0.5 % (ref 0–5)
LYMPHOCYTES ABSOLUTE: 2.38 E9/L (ref 1.5–4)
LYMPHOCYTES RELATIVE PERCENT: 18.6 % (ref 20–42)
MCH RBC QN AUTO: 26 PG (ref 26–35)
MCHC RBC AUTO-ENTMCNC: 30.8 % (ref 32–34.5)
MCV RBC AUTO: 84.3 FL (ref 80–99.9)
METER GLUCOSE: 144 MG/DL (ref 74–99)
METER GLUCOSE: 190 MG/DL (ref 74–99)
METER GLUCOSE: 219 MG/DL (ref 74–99)
METER GLUCOSE: 226 MG/DL (ref 74–99)
MONOCYTES ABSOLUTE: 0.92 E9/L (ref 0.1–0.95)
MONOCYTES RELATIVE PERCENT: 7.2 % (ref 2–12)
NEUTROPHILS ABSOLUTE: 9.02 E9/L (ref 1.8–7.3)
NEUTROPHILS RELATIVE PERCENT: 70.5 % (ref 43–80)
PDW BLD-RTO: 13.6 FL (ref 11.5–15)
PLATELET # BLD: 306 E9/L (ref 130–450)
PMV BLD AUTO: 11.2 FL (ref 7–12)
POTASSIUM SERPL-SCNC: 4.5 MMOL/L (ref 3.5–5)
RBC # BLD: 3.31 E12/L (ref 3.8–5.8)
SODIUM BLD-SCNC: 133 MMOL/L (ref 132–146)
WBC # BLD: 12.8 E9/L (ref 4.5–11.5)

## 2020-06-14 PROCEDURE — 82962 GLUCOSE BLOOD TEST: CPT

## 2020-06-14 PROCEDURE — 87075 CULTR BACTERIA EXCEPT BLOOD: CPT

## 2020-06-14 PROCEDURE — 85025 COMPLETE CBC W/AUTO DIFF WBC: CPT

## 2020-06-14 PROCEDURE — 1280000000 HC REHAB R&B

## 2020-06-14 PROCEDURE — 87070 CULTURE OTHR SPECIMN AEROBIC: CPT

## 2020-06-14 PROCEDURE — 80048 BASIC METABOLIC PNL TOTAL CA: CPT

## 2020-06-14 PROCEDURE — 36415 COLL VENOUS BLD VENIPUNCTURE: CPT

## 2020-06-14 PROCEDURE — 87186 SC STD MICRODIL/AGAR DIL: CPT

## 2020-06-14 PROCEDURE — 2580000003 HC RX 258: Performed by: PHYSICAL MEDICINE & REHABILITATION

## 2020-06-14 PROCEDURE — 6360000002 HC RX W HCPCS: Performed by: PHYSICAL MEDICINE & REHABILITATION

## 2020-06-14 PROCEDURE — 6370000000 HC RX 637 (ALT 250 FOR IP): Performed by: PHYSICAL MEDICINE & REHABILITATION

## 2020-06-14 RX ADMIN — CLONIDINE HYDROCHLORIDE 0.1 MG: 0.1 TABLET ORAL at 08:22

## 2020-06-14 RX ADMIN — Medication 10 ML: at 09:11

## 2020-06-14 RX ADMIN — MICONAZOLE NITRATE: 20.6 POWDER TOPICAL at 21:56

## 2020-06-14 RX ADMIN — CLONIDINE HYDROCHLORIDE 0.1 MG: 0.1 TABLET ORAL at 21:49

## 2020-06-14 RX ADMIN — CILOSTAZOL 50 MG: 50 TABLET ORAL at 21:49

## 2020-06-14 RX ADMIN — PETROLATUM: 42 OINTMENT TOPICAL at 21:56

## 2020-06-14 RX ADMIN — Medication 10 ML: at 21:30

## 2020-06-14 RX ADMIN — TIZANIDINE 2 MG: 4 TABLET ORAL at 08:22

## 2020-06-14 RX ADMIN — SODIUM CHLORIDE, PRESERVATIVE FREE 300 UNITS: 5 INJECTION INTRAVENOUS at 21:30

## 2020-06-14 RX ADMIN — TIZANIDINE 2 MG: 4 TABLET ORAL at 13:01

## 2020-06-14 RX ADMIN — OXYCODONE HYDROCHLORIDE 10 MG: 10 TABLET ORAL at 17:33

## 2020-06-14 RX ADMIN — DULOXETINE HYDROCHLORIDE 120 MG: 60 CAPSULE, DELAYED RELEASE ORAL at 08:22

## 2020-06-14 RX ADMIN — GABAPENTIN 400 MG: 400 CAPSULE ORAL at 08:20

## 2020-06-14 RX ADMIN — ATORVASTATIN CALCIUM 10 MG: 10 TABLET, FILM COATED ORAL at 08:22

## 2020-06-14 RX ADMIN — BISACODYL 5 MG: 5 TABLET, COATED ORAL at 21:49

## 2020-06-14 RX ADMIN — CILOSTAZOL 50 MG: 50 TABLET ORAL at 08:21

## 2020-06-14 RX ADMIN — OXYCODONE HYDROCHLORIDE 10 MG: 10 TABLET, FILM COATED, EXTENDED RELEASE ORAL at 21:48

## 2020-06-14 RX ADMIN — INSULIN LISPRO 4 UNITS: 100 INJECTION, SOLUTION INTRAVENOUS; SUBCUTANEOUS at 11:28

## 2020-06-14 RX ADMIN — SODIUM CHLORIDE, PRESERVATIVE FREE 300 UNITS: 5 INJECTION INTRAVENOUS at 09:11

## 2020-06-14 RX ADMIN — GABAPENTIN 400 MG: 400 CAPSULE ORAL at 21:48

## 2020-06-14 RX ADMIN — OXYCODONE HYDROCHLORIDE 10 MG: 10 TABLET ORAL at 13:01

## 2020-06-14 RX ADMIN — AMLODIPINE BESYLATE 5 MG: 5 TABLET ORAL at 08:21

## 2020-06-14 RX ADMIN — ENOXAPARIN SODIUM 40 MG: 40 INJECTION SUBCUTANEOUS at 21:47

## 2020-06-14 RX ADMIN — OXYCODONE HYDROCHLORIDE 10 MG: 10 TABLET, FILM COATED, EXTENDED RELEASE ORAL at 08:19

## 2020-06-14 RX ADMIN — INSULIN LISPRO 2 UNITS: 100 INJECTION, SOLUTION INTRAVENOUS; SUBCUTANEOUS at 21:53

## 2020-06-14 RX ADMIN — GABAPENTIN 400 MG: 400 CAPSULE ORAL at 13:01

## 2020-06-14 RX ADMIN — INSULIN LISPRO 2 UNITS: 100 INJECTION, SOLUTION INTRAVENOUS; SUBCUTANEOUS at 16:04

## 2020-06-14 RX ADMIN — METOPROLOL TARTRATE 25 MG: 25 TABLET, FILM COATED ORAL at 21:49

## 2020-06-14 RX ADMIN — PETROLATUM: 42 OINTMENT TOPICAL at 07:00

## 2020-06-14 RX ADMIN — ENOXAPARIN SODIUM 40 MG: 40 INJECTION SUBCUTANEOUS at 08:19

## 2020-06-14 RX ADMIN — TIZANIDINE 2 MG: 4 TABLET ORAL at 21:49

## 2020-06-14 RX ADMIN — METOPROLOL TARTRATE 25 MG: 25 TABLET, FILM COATED ORAL at 08:20

## 2020-06-14 ASSESSMENT — PAIN DESCRIPTION - ORIENTATION
ORIENTATION: RIGHT

## 2020-06-14 ASSESSMENT — PAIN DESCRIPTION - LOCATION
LOCATION: HIP

## 2020-06-14 ASSESSMENT — PAIN SCALES - GENERAL
PAINLEVEL_OUTOF10: 7
PAINLEVEL_OUTOF10: 0
PAINLEVEL_OUTOF10: 0
PAINLEVEL_OUTOF10: 9
PAINLEVEL_OUTOF10: 6
PAINLEVEL_OUTOF10: 0
PAINLEVEL_OUTOF10: 10
PAINLEVEL_OUTOF10: 8
PAINLEVEL_OUTOF10: 10
PAINLEVEL_OUTOF10: 9
PAINLEVEL_OUTOF10: 9

## 2020-06-14 ASSESSMENT — PAIN DESCRIPTION - PAIN TYPE
TYPE: ACUTE PAIN;SURGICAL PAIN
TYPE: ACUTE PAIN

## 2020-06-14 ASSESSMENT — PAIN DESCRIPTION - ONSET
ONSET: ON-GOING

## 2020-06-14 ASSESSMENT — PAIN DESCRIPTION - FREQUENCY
FREQUENCY: CONTINUOUS

## 2020-06-14 ASSESSMENT — PAIN DESCRIPTION - PROGRESSION
CLINICAL_PROGRESSION: NOT CHANGED

## 2020-06-14 ASSESSMENT — PAIN DESCRIPTION - DESCRIPTORS
DESCRIPTORS: ACHING;DISCOMFORT;PRESSURE
DESCRIPTORS: ACHING;CONSTANT;DISCOMFORT;PRESSURE
DESCRIPTORS: ACHING;CONSTANT

## 2020-06-14 ASSESSMENT — PAIN - FUNCTIONAL ASSESSMENT
PAIN_FUNCTIONAL_ASSESSMENT: PREVENTS OR INTERFERES SOME ACTIVE ACTIVITIES AND ADLS

## 2020-06-14 NOTE — PROGRESS NOTES
Dr. Kyra Aguillon notified of patients leg wound. Message sent requesting resident to assess. Awaiting response.

## 2020-06-14 NOTE — CONSULTS
Right 4/20/2020    RIGHT THIGH WOUND DRESSING CHANGE; DEBRIDEMENT, removal of muscle performed by Joshua Medina MD at [address] Right 4/21/2020    RIGHT THIGH WOUND DRESSING CHANGE POSSIBLE  DEBRIDEMENT - NEEDS BEN Roberts / Ady Walter TO SEE performed by Lora Lundy MD at [address] Right 4/23/2020    RIGHT THIGH WOUND DRESSING CHANGE and DEBRIDEMENT performed by Ayse Benoit MD at [address] [facility] Right 11/1/2018    AMPUTATION ABOVE KNEE RIGHT LEG performed by Ayse Benoit MD at [facility] Right 5/24/2018    RIGHT FOOT INCISION AND DRAINAGE WITH PARTIAL BONE RESECTION performed by Pratik Platt DPM at [address] OFFICE/OUTPT VISIT,PROCEDURE ONLY Right 8/3/2018    INCISION AND DRAINAGE MULTIPLE AREAS RIGHT FOOT WITH DEBRIDEMENT SOFT TISSUE performed by Pratik Platt DPM at [facility] Right     leg      Current Medications:    dextrose        ammonium lactate, oxyCODONE **OR** oxyCODONE, glucose, dextrose, glucagon (rDNA), dextrose, acetaminophen, polyethylene glycol, acetaminophen    oxyCODONE  10 mg Oral 2 times per day    tiZANidine  2 mg Oral TID    lidocaine  1 patch Transdermal Daily    gabapentin  400 mg Oral TID    mineral oil-hydrophilic petrolatum   Topical BID    amLODIPine  5 mg Oral Daily    atorvastatin  10 mg Oral Daily    bisacodyl  5 mg Oral BID    cilostazol  50 mg Oral BID    cloNIDine  0.1 mg Oral BID    DULoxetine  120 mg Oral Daily    enoxaparin  40 mg Subcutaneous BID    insulin lispro  0-12 Units Subcutaneous TID WC    insulin lispro  0-6 Units Subcutaneous Nightly    metoprolol tartrate  25 mg Oral BID    miconazole   Topical BID    heparin flush  300 Units Intracatheter BID    sodium chloride flush  10 mL Intravenous 2 times per day        Allergies:  Patient has no known allergies.     Social History     Socioeconomic History   

## 2020-06-14 NOTE — PROGRESS NOTES
06/14/20 1311   Attendance   Activity 1:1   Participation Active participation   [place] Demonstrates ability to complete social goals   Social Skills Interacts independently in social activity   Leisure Education Demonstrates knowledge of benefits of leisure involvement  Braden Rapp identified enjoyment as a benefit.)   Time Spent With Patient   Minutes 36

## 2020-06-15 LAB
METER GLUCOSE: 185 MG/DL (ref 74–99)
METER GLUCOSE: 202 MG/DL (ref 74–99)
METER GLUCOSE: 204 MG/DL (ref 74–99)
METER GLUCOSE: 250 MG/DL (ref 74–99)

## 2020-06-15 PROCEDURE — 6360000002 HC RX W HCPCS: Performed by: PHYSICAL MEDICINE & REHABILITATION

## 2020-06-15 PROCEDURE — 2580000003 HC RX 258: Performed by: PHYSICAL MEDICINE & REHABILITATION

## 2020-06-15 PROCEDURE — 97530 THERAPEUTIC ACTIVITIES: CPT

## 2020-06-15 PROCEDURE — 82962 GLUCOSE BLOOD TEST: CPT

## 2020-06-15 PROCEDURE — 1280000000 HC REHAB R&B

## 2020-06-15 PROCEDURE — 6370000000 HC RX 637 (ALT 250 FOR IP): Performed by: PHYSICAL MEDICINE & REHABILITATION

## 2020-06-15 PROCEDURE — 97110 THERAPEUTIC EXERCISES: CPT

## 2020-06-15 RX ADMIN — BISACODYL 5 MG: 5 TABLET, COATED ORAL at 20:37

## 2020-06-15 RX ADMIN — SODIUM CHLORIDE, PRESERVATIVE FREE 300 UNITS: 5 INJECTION INTRAVENOUS at 21:41

## 2020-06-15 RX ADMIN — INSULIN LISPRO 2 UNITS: 100 INJECTION, SOLUTION INTRAVENOUS; SUBCUTANEOUS at 20:37

## 2020-06-15 RX ADMIN — CILOSTAZOL 50 MG: 50 TABLET ORAL at 20:38

## 2020-06-15 RX ADMIN — SODIUM CHLORIDE, PRESERVATIVE FREE 300 UNITS: 5 INJECTION INTRAVENOUS at 11:09

## 2020-06-15 RX ADMIN — INSULIN LISPRO 4 UNITS: 100 INJECTION, SOLUTION INTRAVENOUS; SUBCUTANEOUS at 16:08

## 2020-06-15 RX ADMIN — METOPROLOL TARTRATE 25 MG: 25 TABLET, FILM COATED ORAL at 09:52

## 2020-06-15 RX ADMIN — Medication 10 ML: at 11:06

## 2020-06-15 RX ADMIN — DULOXETINE HYDROCHLORIDE 120 MG: 60 CAPSULE, DELAYED RELEASE ORAL at 09:53

## 2020-06-15 RX ADMIN — TIZANIDINE 2 MG: 4 TABLET ORAL at 13:21

## 2020-06-15 RX ADMIN — GABAPENTIN 400 MG: 400 CAPSULE ORAL at 13:21

## 2020-06-15 RX ADMIN — ENOXAPARIN SODIUM 40 MG: 40 INJECTION SUBCUTANEOUS at 09:54

## 2020-06-15 RX ADMIN — OXYCODONE HYDROCHLORIDE 10 MG: 10 TABLET ORAL at 11:14

## 2020-06-15 RX ADMIN — TIZANIDINE 2 MG: 4 TABLET ORAL at 20:38

## 2020-06-15 RX ADMIN — INSULIN LISPRO 4 UNITS: 100 INJECTION, SOLUTION INTRAVENOUS; SUBCUTANEOUS at 06:49

## 2020-06-15 RX ADMIN — CLONIDINE HYDROCHLORIDE 0.1 MG: 0.1 TABLET ORAL at 09:52

## 2020-06-15 RX ADMIN — MICONAZOLE NITRATE: 20.6 POWDER TOPICAL at 20:39

## 2020-06-15 RX ADMIN — Medication 10 ML: at 21:41

## 2020-06-15 RX ADMIN — OXYCODONE HYDROCHLORIDE 10 MG: 10 TABLET, FILM COATED, EXTENDED RELEASE ORAL at 09:55

## 2020-06-15 RX ADMIN — ENOXAPARIN SODIUM 40 MG: 40 INJECTION SUBCUTANEOUS at 20:37

## 2020-06-15 RX ADMIN — OXYCODONE HYDROCHLORIDE 10 MG: 10 TABLET, FILM COATED, EXTENDED RELEASE ORAL at 20:37

## 2020-06-15 RX ADMIN — PETROLATUM: 42 OINTMENT TOPICAL at 20:39

## 2020-06-15 RX ADMIN — GABAPENTIN 400 MG: 400 CAPSULE ORAL at 09:52

## 2020-06-15 RX ADMIN — CLONIDINE HYDROCHLORIDE 0.1 MG: 0.1 TABLET ORAL at 20:38

## 2020-06-15 RX ADMIN — INSULIN LISPRO 2 UNITS: 100 INJECTION, SOLUTION INTRAVENOUS; SUBCUTANEOUS at 11:18

## 2020-06-15 RX ADMIN — OXYCODONE HYDROCHLORIDE 10 MG: 10 TABLET ORAL at 06:47

## 2020-06-15 RX ADMIN — TIZANIDINE 2 MG: 4 TABLET ORAL at 09:52

## 2020-06-15 RX ADMIN — METOPROLOL TARTRATE 25 MG: 25 TABLET, FILM COATED ORAL at 20:37

## 2020-06-15 RX ADMIN — GABAPENTIN 400 MG: 400 CAPSULE ORAL at 20:37

## 2020-06-15 RX ADMIN — ATORVASTATIN CALCIUM 10 MG: 10 TABLET, FILM COATED ORAL at 09:53

## 2020-06-15 RX ADMIN — BISACODYL 5 MG: 5 TABLET, COATED ORAL at 09:52

## 2020-06-15 RX ADMIN — CILOSTAZOL 50 MG: 50 TABLET ORAL at 09:53

## 2020-06-15 RX ADMIN — AMLODIPINE BESYLATE 5 MG: 5 TABLET ORAL at 09:53

## 2020-06-15 ASSESSMENT — PAIN DESCRIPTION - FREQUENCY
FREQUENCY: CONTINUOUS

## 2020-06-15 ASSESSMENT — PAIN DESCRIPTION - ORIENTATION
ORIENTATION: LEFT
ORIENTATION: RIGHT
ORIENTATION: RIGHT

## 2020-06-15 ASSESSMENT — PAIN SCALES - GENERAL
PAINLEVEL_OUTOF10: 0
PAINLEVEL_OUTOF10: 5
PAINLEVEL_OUTOF10: 8
PAINLEVEL_OUTOF10: 7
PAINLEVEL_OUTOF10: 8
PAINLEVEL_OUTOF10: 8
PAINLEVEL_OUTOF10: 5
PAINLEVEL_OUTOF10: 3
PAINLEVEL_OUTOF10: 6

## 2020-06-15 ASSESSMENT — PAIN DESCRIPTION - LOCATION
LOCATION: HIP;INCISION

## 2020-06-15 ASSESSMENT — PAIN DESCRIPTION - PROGRESSION
CLINICAL_PROGRESSION: NOT CHANGED

## 2020-06-15 ASSESSMENT — PAIN DESCRIPTION - PAIN TYPE
TYPE: SURGICAL PAIN

## 2020-06-15 ASSESSMENT — PAIN DESCRIPTION - ONSET
ONSET: ON-GOING

## 2020-06-15 ASSESSMENT — PAIN DESCRIPTION - DESCRIPTORS
DESCRIPTORS: ACHING;DISCOMFORT;SORE
DESCRIPTORS: ACHING;DISCOMFORT
DESCRIPTORS: ACHING;DISCOMFORT;CONSTANT;SORE

## 2020-06-15 NOTE — PROGRESS NOTES
06/15/20 1506   Attendance   Activity Games  ([address])   Participation Active participation   [place] Demonstrates ability to complete social goals   Social Skills Interacts independently in social activity   Leisure Education Demonstrates knowledge of benefits of leisure involvement  Yuridia Calvillo identified comradare & a beautiful / awesome day.)   Time Spent With Patient   Minutes 39

## 2020-06-15 NOTE — PROGRESS NOTES
[]? Individual Tx-   []? Concurrent Tx -  []? Co-Tx -   []? Group Tx -   []? Time Missed -              Total Tx Time 45 Mins       Stevan BUCK/CHRISSIE [id number]                              Cosigned by: Kalyan Best OT at 6/13/2020  5:21 PM   Revision History                          Assessment:    Condition: In stable condition. Worsening. (Right above-knee amputation). Plan:   Up to wheel chair. (Was making good progress in therapy  Again further drainage from the wound  Vascular has seen  Wound culture is pending  Will resume therapy and pain medications).        Fernando Ramos MD  6/15/2020

## 2020-06-15 NOTE — PROGRESS NOTES
Physical Therapy    Facility/Department: [facility] REHAB  Treatment Note   NAME: Richa Rincon  : 1955  MRN: [id number]    Date of Service: 6/15/2020     Evaluating Therapist: Jayson Cardoso, PT, DPT    ROOM: [facility]  DIAGNOSIS: R AKA  PRECAUTIONS: falls, R AKA  HPI: Pt has hx AKA in 2018. Pt admitted to [facility] due to residual limb infection, underwent R Leg I&D 2020,   R Thigh Wound Dressing Change, Debridement, Removal of Muscle 2020,   Excisional debridement of the infected necrotic wound right groin and right thigh 2020. [facility] Social:  Pt lives with alone in a 1 floor plan 1 steps without rails to enter. Pt reports having HH aide present x 3 hrs daily to assist with homemaking/ADLs  Prior to admission: Pt ambulated short distances with [employer] and R prosthetic, reports having WC and performing majority of ADLs/activities in home at [facility] level     Initial Evaluation  20 AM     PM  Short Term Goals Long Term Goals    Was pt agreeable to Eval/treatment? Yes yes Yes      Does pt have pain?  Pt c/o significant pain at R residual limb R residual limb  pain ( 8/10)  Pt limited by pain 8/10  Drainage from wound noted and nursing changed dressing      Bed Mobility  Rolling: Mila  Supine to sit: Mila  Sit to supine: Mila  Scooting: Mila Supine to sit hospital bed supervision   Sit to supine nt  Rolling mod I  - using rails  Scooting lateral min for right scoot  Supine to sit supervision  Mat   Sit to supine supervision   rolling supervision   Scooting min    SBA Modified Independent   Transfers Sit to stand: [address] [facility]  Stand to sit: ModA  Stand pivot: ModA with Bariatric [employer]    Sliding board transfer Mila from Juan Jose Schwab table Sit to stand CGA  Stand to sit: CGA  Stand pivot with standard walker in room cga      bariatric ww with CGA - NT       Sit to stand sba   Stand to sit  sba  Stand pivot with ww bariatric ww cga/sba   SBA Modified Independent required with all activity/mobility. Pt very quiet and does not say much. Tried to get pt on his stomach in the bed and pt not able to do due to pain. Pt performed stand pivot to chair and propelled chair to OT. PM:    Pt up in chair upon arrival.   Pt performed function as per above. Drainage from LE amputation site and nursing changed dressing on LE. Pt required assist with pants able to roll. Pt able to get on his stomach on mat rolling to his left side and pt tolerated laying on stomach approx 15 min. Significant education to pt on contracture prevention and he requires further education on as well as learning how to roll to stomach in hospital bed. PM    Time in: 9:15  Time out: 10:00    Time in 1345              Time out 1430  Pt is making fair progress toward established Physical Therapy goals. Continue with physical therapy current plan of care.     Jennifer Andrade, [address]

## 2020-06-15 NOTE — PROGRESS NOTES
Physical Therapy    Facility/Department: [address] REHAB  Weekly Progress Note    NAME: Aniya Edmondson  : 1955  MRN: [id number]    Date of Service: 6/15/2020     Evaluating Therapist: Sunitha Ram, PT, DPT    ROOM: [facility]  DIAGNOSIS: R AKA  PRECAUTIONS: falls, R AKA  HPI: Pt has hx AKA in 2018. Pt admitted to [address] [place] [address] due to residual limb infection, underwent R Leg I&D 2020,   R Thigh Wound Dressing Change, Debridement, Removal of Muscle 2020,   Excisional debridement of the infected necrotic wound right groin and right thigh 2020. [facility] Social:  Pt lives with alone in a 1 floor plan 1 steps without rails to enter.  Pt reports having HH aide present x 3 hrs daily to assist with homemaking/ADLs  Prior to admission: Pt ambulated short distances with [employer] and R prosthetic, reports having WC and performing majority of ADLs/activities in home at [facility] level     Initial Evaluation  20 608/20 6/15/2020 Comments Short Term Goals Long Term Goals    Bed Mobility  Rolling: Makenzie  Supine to sit: Makenzie  Sit to supine: Makenzie  Scooting: Makenzie Rolling: cgA  Supine to sit: cgA  Sit to supine: Makenzie  Scooting: cgA  rolling supervision  using rail  Supine to sit sba  Sit to supine sba/s  Supine scooting min  SBA Modified Independent   Transfers Sit to stand: ModA from raised EOM  Stand to sit: ModA  Stand pivot: ModA with Bariatric [employer]    Sliding board transfer Makenzie from Juan Jose Schwab table Sit to stand: cgA  Stand to sit: cgA  Stand pivot: cg/Makenzie with Bariatric [employer]     Sliding board: sba Sit to stand sba  Stand to sit sba  Stand pivot cga/sba  with bariatric ww     Transfer board sba  One arm pushing from mat, one arm on walker leading to smoother transitioning   cga/sba    Stand pivot cga    SBA Modified Independent   Ambulation    TBA   10/12 feet bariatric ww cga   Safety cues to dec hop length 10 feet with bariatric WW with SBA 50 feet with Bariatric WW with Supervision   Wheel

## 2020-06-16 LAB
ANAEROBIC CULTURE: NORMAL
METER GLUCOSE: 151 MG/DL (ref 74–99)
METER GLUCOSE: 158 MG/DL (ref 74–99)
METER GLUCOSE: 175 MG/DL (ref 74–99)
METER GLUCOSE: 249 MG/DL (ref 74–99)
ORGANISM: ABNORMAL
WOUND/ABSCESS: ABNORMAL
WOUND/ABSCESS: NORMAL

## 2020-06-16 PROCEDURE — 97530 THERAPEUTIC ACTIVITIES: CPT

## 2020-06-16 PROCEDURE — 97110 THERAPEUTIC EXERCISES: CPT

## 2020-06-16 PROCEDURE — 6360000002 HC RX W HCPCS: Performed by: PHYSICAL MEDICINE & REHABILITATION

## 2020-06-16 PROCEDURE — 6370000000 HC RX 637 (ALT 250 FOR IP): Performed by: PHYSICAL MEDICINE & REHABILITATION

## 2020-06-16 PROCEDURE — 82962 GLUCOSE BLOOD TEST: CPT

## 2020-06-16 PROCEDURE — 1280000000 HC REHAB R&B

## 2020-06-16 PROCEDURE — 6370000000 HC RX 637 (ALT 250 FOR IP): Performed by: SPECIALIST

## 2020-06-16 PROCEDURE — 2580000003 HC RX 258: Performed by: PHYSICAL MEDICINE & REHABILITATION

## 2020-06-16 RX ORDER — LINEZOLID 600 MG/1
600 TABLET, FILM COATED ORAL EVERY 12 HOURS SCHEDULED
Status: ACTIVE | OUTPATIENT
Start: 2020-06-16 | End: 2020-06-23

## 2020-06-16 RX ADMIN — PETROLATUM: 42 OINTMENT TOPICAL at 08:36

## 2020-06-16 RX ADMIN — ENOXAPARIN SODIUM 40 MG: 40 INJECTION SUBCUTANEOUS at 08:29

## 2020-06-16 RX ADMIN — CLONIDINE HYDROCHLORIDE 0.1 MG: 0.1 TABLET ORAL at 08:31

## 2020-06-16 RX ADMIN — Medication 10 ML: at 08:26

## 2020-06-16 RX ADMIN — INSULIN LISPRO 4 UNITS: 100 INJECTION, SOLUTION INTRAVENOUS; SUBCUTANEOUS at 16:08

## 2020-06-16 RX ADMIN — SODIUM CHLORIDE, PRESERVATIVE FREE 300 UNITS: 5 INJECTION INTRAVENOUS at 22:05

## 2020-06-16 RX ADMIN — INSULIN LISPRO 2 UNITS: 100 INJECTION, SOLUTION INTRAVENOUS; SUBCUTANEOUS at 11:29

## 2020-06-16 RX ADMIN — AMLODIPINE BESYLATE 5 MG: 5 TABLET ORAL at 08:33

## 2020-06-16 RX ADMIN — METOPROLOL TARTRATE 25 MG: 25 TABLET, FILM COATED ORAL at 21:03

## 2020-06-16 RX ADMIN — INSULIN LISPRO 2 UNITS: 100 INJECTION, SOLUTION INTRAVENOUS; SUBCUTANEOUS at 06:41

## 2020-06-16 RX ADMIN — SODIUM CHLORIDE, PRESERVATIVE FREE 300 UNITS: 5 INJECTION INTRAVENOUS at 08:27

## 2020-06-16 RX ADMIN — TIZANIDINE 2 MG: 4 TABLET ORAL at 20:00

## 2020-06-16 RX ADMIN — GABAPENTIN 400 MG: 400 CAPSULE ORAL at 08:34

## 2020-06-16 RX ADMIN — CLONIDINE HYDROCHLORIDE 0.1 MG: 0.1 TABLET ORAL at 21:03

## 2020-06-16 RX ADMIN — OXYCODONE HYDROCHLORIDE 10 MG: 10 TABLET ORAL at 13:35

## 2020-06-16 RX ADMIN — GABAPENTIN 400 MG: 400 CAPSULE ORAL at 21:03

## 2020-06-16 RX ADMIN — DULOXETINE HYDROCHLORIDE 120 MG: 60 CAPSULE, DELAYED RELEASE ORAL at 08:34

## 2020-06-16 RX ADMIN — Medication 10 ML: at 22:04

## 2020-06-16 RX ADMIN — CILOSTAZOL 50 MG: 50 TABLET ORAL at 08:33

## 2020-06-16 RX ADMIN — LINEZOLID 600 MG: 600 TABLET, FILM COATED ORAL at 21:03

## 2020-06-16 RX ADMIN — OXYCODONE HYDROCHLORIDE 10 MG: 10 TABLET, FILM COATED, EXTENDED RELEASE ORAL at 08:31

## 2020-06-16 RX ADMIN — OXYCODONE HYDROCHLORIDE 10 MG: 10 TABLET, FILM COATED, EXTENDED RELEASE ORAL at 21:03

## 2020-06-16 RX ADMIN — TIZANIDINE 2 MG: 4 TABLET ORAL at 13:36

## 2020-06-16 RX ADMIN — CILOSTAZOL 50 MG: 50 TABLET ORAL at 21:03

## 2020-06-16 RX ADMIN — ATORVASTATIN CALCIUM 10 MG: 10 TABLET, FILM COATED ORAL at 08:34

## 2020-06-16 RX ADMIN — TIZANIDINE 2 MG: 4 TABLET ORAL at 08:36

## 2020-06-16 RX ADMIN — INSULIN LISPRO 3 UNITS: 100 INJECTION, SOLUTION INTRAVENOUS; SUBCUTANEOUS at 21:01

## 2020-06-16 RX ADMIN — BISACODYL 5 MG: 5 TABLET, COATED ORAL at 21:03

## 2020-06-16 RX ADMIN — OXYCODONE HYDROCHLORIDE 10 MG: 10 TABLET ORAL at 08:32

## 2020-06-16 RX ADMIN — GABAPENTIN 400 MG: 400 CAPSULE ORAL at 13:35

## 2020-06-16 RX ADMIN — ENOXAPARIN SODIUM 40 MG: 40 INJECTION SUBCUTANEOUS at 21:03

## 2020-06-16 RX ADMIN — METOPROLOL TARTRATE 25 MG: 25 TABLET, FILM COATED ORAL at 08:33

## 2020-06-16 ASSESSMENT — PAIN SCALES - GENERAL
PAINLEVEL_OUTOF10: 0
PAINLEVEL_OUTOF10: 10
PAINLEVEL_OUTOF10: 9
PAINLEVEL_OUTOF10: 9
PAINLEVEL_OUTOF10: 8

## 2020-06-16 NOTE — PROGRESS NOTES
std. Walker using LLE hopping over method. .     [] Family teach completed on:    Pain Level: 0/10    Additional Notes:   6/6/20 Pt tearful this am since did not have any clothes and emotional in general with this therapist offering him support. Patient has made fair+ progress during treatment sessions toward set goals. Therapy emphasis to obtain goals: Strengthening, Wheelchair Mobility Training, Positioning, Gait Training, Safety Education & Training, Balance Training, Patient/Caregiver Education & Training, Self-Care / ADL, Functional Mobility Training, Equipment Evaluation, Education, & procurement, Home Management Training, Endurance Training      [x] Continue with current OT Plan of care.   [] Prepare for Discharge     DISCHARGE RECOMMENDATIONS  Recommended DME:    Post Discharge Care:   []Home Independently  [x]Home with assist PRN [x]Home with nursing aide  [x]Home with Home Health OT []Home with Out Pt OT []Other: ___   Comments:         Time in Time out Tx Time Breakdown  Variance:   First Session  2409 [id number] [x] Individual Tx-45   [] Concurrent Tx -  [] Co-Tx -   [] Group Tx -   [] Time Missed -     Second Session  1500 1515 [x] Individual Tx- 15 Mins  [] Concurrent Tx -   [] Co-Tx -   [] Group Tx -   [x] Time Missed - 30 min  Pt requesting private conversation with family member, and increased encouragement due to recent change to contact isolation in room    Third Session    [] Individual Tx-   [] Concurrent Tx -  [] Co-Tx -   [] Group Tx -   [] Time Missed -         Total Tx Time 60 Mins      Diana BUCK/CHRISSIE [id number]

## 2020-06-16 NOTE — PROGRESS NOTES
Physical Therapy    Facility/Department: [address] REHAB  Treatment Note   NAME: Kymberly Post  : 1955  MRN: [id number]    Date of Service: 2020     Evaluating Therapist: Yani Suggs, PT, DPT    ROOM: 5690-X  DIAGNOSIS: R AKA  PRECAUTIONS: falls, R AKA, contact isolation a  due to MRSA of R LE wound  HPI: Pt has hx AKA in 2018. Pt admitted to [facility] due to residual limb infection, underwent R Leg I&D 2020,   R Thigh Wound Dressing Change, Debridement, Removal of Muscle 2020,   Excisional debridement of the infected necrotic wound right groin and right thigh 2020. [facility] Social:  Pt lives with alone in a 1 floor plan 1 steps without rails to enter. Pt reports having HH aide present x 3 hrs daily to assist with homemaking/ADLs  Prior to admission: Pt ambulated short distances with [employer] and R prosthetic, reports having WC and performing majority of ADLs/activities in home at R [facility] 23 level     Initial Evaluation  20 AM     PM  Short Term Goals Long Term Goals    Was pt agreeable to Eval/treatment? Yes NT pt needing R dressing change due to drainage leaking through pt's pants and bed linen. Yes      Does pt have pain?  Pt c/o significant pain at R residual limb  Pt limited by pain 8/10  Drainage from wound noted and nursing changed dressing      Bed Mobility  Rolling: Mila  Supine to sit: Mila  Sit to supine: Mila  Scooting: Mila  Supine to sit supervision  Mat   Sit to supine supervision   rolling supervision   Scooting min    SBA Modified Independent   Transfers Sit to stand: [address] [facility] [address] from [facility] EO  Stand to sit: ModA  Stand pivot: ModA with Bariatric [employer]    Sliding board transfer Mila from Juan Jose Schwab table  Sit to stand sba   Stand to sit  sba  Stand pivot with ww bariatric ww cga/sba   SBA Modified Independent   Ambulation    TBA    12 feet x 2 ww cga   Cues to decrease hop length    10 feet with bariatric WW with SBA 50 feet with Bariatric WW with

## 2020-06-16 NOTE — PROGRESS NOTES
Renu Galvez is a 60 y.o. male patient.     Current Facility-Administered Medications   Medication Dose Route Frequency Provider Last Rate Last Dose    ammonium lactate (LAC-HYDRIN) 12 % lotion   Topical PRN Jose Carlos Canela MD        oxyCODONE (OXYCONTIN) extended release tablet 10 mg  10 mg Oral 2 times per day Kevin Khan MD   10 mg at 06/15/20 2037    tiZANidine (ZANAFLEX) tablet 2 mg  2 mg Oral TID Kevin Khan MD   2 mg at 06/15/20 2038    lidocaine 4 % external patch 1 patch  1 patch Transdermal Daily Kevin Khan MD   1 patch at 06/14/20 0819    oxyCODONE (ROXICODONE) immediate release tablet 5 mg  5 mg Oral Q4H PRN Kevin Khan MD        Or    oxyCODONE HCl (OXY-IR) immediate release tablet 10 mg  10 mg Oral Q4H PRN Kevin Khan MD   10 mg at 06/15/20 1114    gabapentin (NEURONTIN) capsule 400 mg  400 mg Oral TID Kevin Khan MD   400 mg at 06/15/20 2037    mineral oil-hydrophilic petrolatum (HYDROPHOR) ointment   Topical BID Mil Blanco DO        glucose (GLUTOSE) 40 % oral gel 15 g  15 g Oral PRN Kevin Khan MD        dextrose 50 % IV solution  12.5 g Intravenous PRN Kevin Khan MD        glucagon (rDNA) injection 1 mg  1 mg Intramuscular PRN Kevin Khan MD        dextrose 5 % solution  100 mL/hr Intravenous PRN Kevin Khan MD        acetaminophen (TYLENOL) tablet 650 mg  650 mg Oral Q4H PRN Kevin Khan MD        polyethylene glycol (GLYCOLAX) packet 17 g  17 g Oral Daily PRN Kevin Khan MD        acetaminophen (TYLENOL) tablet 650 mg  650 mg Oral Q6H PRN Kevin Khan MD        amLODIPine (NORVASC) tablet 5 mg  5 mg Oral Daily Kevin Khan MD   5 mg at 06/15/20 0953    atorvastatin (LIPITOR) tablet 10 mg  10 mg Oral Daily Kevin Khan MD   10 mg at 06/15/20 0953    bisacodyl (DULCOLAX) EC tablet 5 mg  5 mg Oral BID Kevin Khan MD   5 mg at 06/15/20 2037    cilostazol (PLETAL) tablet 50 mg  50 mg Oral BID Valerie Bennett MD   50 mg at 06/15/20 2038    cloNIDine (CATAPRES) tablet 0.1 mg  0.1 mg Oral BID Valerie Bennett MD   0.1 mg at 06/15/20 2038    DULoxetine (CYMBALTA) extended release capsule 120 mg  120 mg Oral Daily Eddie FLORIDALMA Gomez MD   120 mg at 06/15/20 0953    enoxaparin (LOVENOX) injection 40 mg  40 mg Subcutaneous BID Valerie Bennett MD   40 mg at 06/15/20 2037    insulin lispro (HUMALOG) injection vial 0-12 Units  0-12 Units Subcutaneous TID WC Valerie Bennett MD   2 Units at 06/16/20 0641    insulin lispro (HUMALOG) injection vial 0-6 Units  0-6 Units Subcutaneous Nightly Valerie Bennett MD   2 Units at 06/15/20 2037    metoprolol tartrate (LOPRESSOR) tablet 25 mg  25 mg Oral BID Valerie Bennett MD   25 mg at 06/15/20 2037    miconazole (MICOTIN) 2 % powder   Topical BID Valerie Bennett MD        heparin flush 100 UNIT/ML injection 300 Units  300 Units Intracatheter BID Valerie Bennett MD   300 Units at 06/15/20 2141    sodium chloride flush 0.9 % injection 10 mL  10 mL Intravenous 2 times per day Valerie Bennett MD   10 mL at 06/15/20 2141     No Known Allergies  Active Problems:    Above knee amputation of right lower extremity ([address]  Resolved Problems:    * No resolved hospital problems. *    Blood pressure (!) 159/74, pulse 60, temperature 98.5 °F (36.9 °C), temperature source Temporal, resp. rate 18, height 6' 2\" (1.88 m), weight 250 lb 1.6 oz (113.4 kg), SpO2 98 %. Subjective:  Symptoms:  Stable. He reports weakness. Diet:  Adequate intake. Activity level: Impaired due to pain. Pain:  He complains of pain that is moderate. Objective:  General Appearance: In no acute distress. Vital signs: (most recent): Blood pressure (!) 159/74, pulse 60, temperature 98.5 °F (36.9 °C), temperature source Temporal, resp. rate 18, height 6' 2\" (1.88 m), weight 250 lb 1.6 oz (113.4 kg), SpO2 98 %. Vital signs are normal.    Output: Producing urine and producing stool. Lungs:  Normal effort and normal respiratory rate. Breath sounds clear to auscultation. Heart: Normal rate. Regular rhythm. S1 normal and S2 normal.    Abdomen: Abdomen is soft. Bowel sounds are normal.   There is no abdominal tenderness. Extremities: There is deformity. (Drainage from right above-knee amputation site)  Neurological: Patient is alert. Assessment:    Condition: In stable condition. (Right above-knee amputation). Plan:   Up to wheel chair. (Showing staph from the culture  I am going to reconsult infectious disease  He may benefit from IV antibiotics but will have to be cautious of renal function  May also use oral antibiotics  Vascular is following  Will review functional progress in team today).        Lata Woods MD  6/16/2020

## 2020-06-16 NOTE — CONSULTS
(peripheral vascular disease) [facility])      Past Surgical History:        Procedure Laterality Date    AMPUTATION ABOVE KNEE Right 4/28/2020    DEBRIDEMENT OF AMPUTATION RIGHT ABOVE KNEE performed by MD Petty at [address] [facility] Right 4/30/2020    DEBRIDEMENT OF AMPUTATION RIGHT ABOVE KNEE,  POSS. ABOVE KNEE REVISION, POSS. CLOSURE, POSS.WOUND VAC -- BEN Wolfe / Paul Arias TO LOOK IN performed by MD Petty at [address] Right 3/20/2020    RIGHT LOWER EXTREMITY THROMBECTOMY, POSSIBLE ANGIOGRAM, POSSIBLE INTERVENTION, POSSIBLE BYPASS. performed by MD Petty at [address] Right 4/17/2020    RIGHT LEG DEBRIDEMENT INCISION AND DRAINAGE performed by Vikram Wooten MD at [address] Right 4/20/2020    RIGHT THIGH WOUND DRESSING CHANGE; DEBRIDEMENT, removal of muscle performed by Vikram Wooten MD at [address] Right 4/21/2020    RIGHT THIGH WOUND DRESSING CHANGE POSSIBLE  DEBRIDEMENT - NEEDS BEN Wolfe / Chrissy Nielsen TO SEE performed by Errol Green MD at [address] Right 4/23/2020    RIGHT THIGH WOUND DRESSING CHANGE and DEBRIDEMENT performed by MD Petty at [address] [facility] Right 11/1/2018    AMPUTATION ABOVE KNEE RIGHT LEG performed by MD Petty at [facility] Right 5/24/2018    RIGHT FOOT INCISION AND DRAINAGE WITH PARTIAL BONE RESECTION performed by Arnulfo Burleson DPM at [address] [facility] OFFICE/OUTPT VISIT,PROCEDURE ONLY Right 8/3/2018    INCISION AND DRAINAGE MULTIPLE AREAS RIGHT FOOT WITH DEBRIDEMENT SOFT TISSUE performed by Arnulfo Burleson DPM at [facility] Right     leg      Current Medications:   Scheduled Meds:   oxyCODONE  10 mg Oral 2 times per day    tiZANidine  2 mg Oral TID    lidocaine  1 patch Transdermal muscles to breathe. Symmetrical expansion. Auscultation reveals no wheezing, crackles, or rhonchi. Cardiovascular: S1 and S2 are rhythmic and regular. No murmurs appreciated. Abdomen: Positive bowel sounds to auscultation. Benign to palpation. No masses felt. No hepatosplenomegaly. Genitourinary: Male  Extremities: No clubbing, no cyanosis, no edema.   Musculoskeletal: AKA on the right otherwise equal and symmetrical  Neurological: No focal  Lines: peripheral      CBC+dif:  Recent Labs     06/14/20  1700   WBC 12.8*   HGB 8.6*   HCT 27.9*   MCV 84.3      NEUTROABS 9.02*     Lab Results   Component Value Date    CRP 10.6 (H) 10/24/2018    CRP 2.1 (H) 06/25/2018    CRP 7.4 (H) 06/11/2018     No results found for: [facility]  Lab Results   Component Value Date    SEDRATE 128 (H) 10/24/2018    SEDRATE 77 (H) 06/25/2018    SEDRATE 138 (H) 06/11/2018     Lab Results   Component Value Date    ALT 21 05/10/2020    AST 17 05/10/2020    ALKPHOS 85 05/10/2020    BILITOT 0.2 05/10/2020     Lab Results   Component Value Date     06/14/2020    K 4.5 06/14/2020    K 5.0 06/05/2020    CL 95 06/14/2020    CO2 30 06/14/2020    BUN 22 06/14/2020    CREATININE 2.0 06/14/2020    GFRAA 41 06/14/2020    LABGLOM 41 06/14/2020    GLUCOSE 148 06/14/2020    PROT 6.5 05/10/2020    LABALBU 2.1 05/10/2020    CALCIUM 9.1 06/14/2020    BILITOT 0.2 05/10/2020    ALKPHOS 85 05/10/2020    AST 17 05/10/2020    ALT 21 05/10/2020       Lab Results   Component Value Date    PROTIME 14.7 04/27/2020    INR 1.3 04/27/2020       Lab Results   Component Value Date    TSH 2.270 03/23/2020       Lab Results   Component Value Date    COLORU Yellow 03/20/2020    PHUR 5.5 03/20/2020    WBCUA 0-1 03/20/2020    RBCUA 0-1 03/20/2020    BACTERIA RARE 03/20/2020    CLARITYU Clear 03/20/2020    SPECGRAV <=1.005 03/20/2020    LEUKOCYTESUR Negative 03/20/2020    UROBILINOGEN 0.2 03/20/2020    BILIRUBINUR Negative 03/20/2020    BLOODU LARGE 03/20/2020

## 2020-06-17 LAB
METER GLUCOSE: 137 MG/DL (ref 74–99)
METER GLUCOSE: 199 MG/DL (ref 74–99)
METER GLUCOSE: 210 MG/DL (ref 74–99)
METER GLUCOSE: 226 MG/DL (ref 74–99)

## 2020-06-17 PROCEDURE — 6370000000 HC RX 637 (ALT 250 FOR IP): Performed by: SPECIALIST

## 2020-06-17 PROCEDURE — 97110 THERAPEUTIC EXERCISES: CPT

## 2020-06-17 PROCEDURE — 1280000000 HC REHAB R&B

## 2020-06-17 PROCEDURE — 82962 GLUCOSE BLOOD TEST: CPT

## 2020-06-17 PROCEDURE — 2580000003 HC RX 258: Performed by: PHYSICAL MEDICINE & REHABILITATION

## 2020-06-17 PROCEDURE — 6370000000 HC RX 637 (ALT 250 FOR IP): Performed by: PHYSICAL MEDICINE & REHABILITATION

## 2020-06-17 PROCEDURE — 97530 THERAPEUTIC ACTIVITIES: CPT

## 2020-06-17 PROCEDURE — 6360000002 HC RX W HCPCS: Performed by: PHYSICAL MEDICINE & REHABILITATION

## 2020-06-17 RX ADMIN — SODIUM CHLORIDE, PRESERVATIVE FREE 300 UNITS: 5 INJECTION INTRAVENOUS at 08:46

## 2020-06-17 RX ADMIN — GABAPENTIN 400 MG: 400 CAPSULE ORAL at 08:46

## 2020-06-17 RX ADMIN — BISACODYL 5 MG: 5 TABLET, COATED ORAL at 08:46

## 2020-06-17 RX ADMIN — INSULIN LISPRO 2 UNITS: 100 INJECTION, SOLUTION INTRAVENOUS; SUBCUTANEOUS at 21:15

## 2020-06-17 RX ADMIN — Medication 10 ML: at 08:46

## 2020-06-17 RX ADMIN — OXYCODONE HYDROCHLORIDE 10 MG: 10 TABLET ORAL at 21:12

## 2020-06-17 RX ADMIN — CILOSTAZOL 50 MG: 50 TABLET ORAL at 08:47

## 2020-06-17 RX ADMIN — INSULIN LISPRO 4 UNITS: 100 INJECTION, SOLUTION INTRAVENOUS; SUBCUTANEOUS at 11:30

## 2020-06-17 RX ADMIN — ATORVASTATIN CALCIUM 10 MG: 10 TABLET, FILM COATED ORAL at 08:47

## 2020-06-17 RX ADMIN — ENOXAPARIN SODIUM 40 MG: 40 INJECTION SUBCUTANEOUS at 08:45

## 2020-06-17 RX ADMIN — OXYCODONE HYDROCHLORIDE 10 MG: 10 TABLET ORAL at 13:53

## 2020-06-17 RX ADMIN — TIZANIDINE 2 MG: 4 TABLET ORAL at 21:13

## 2020-06-17 RX ADMIN — LINEZOLID 600 MG: 600 TABLET, FILM COATED ORAL at 08:46

## 2020-06-17 RX ADMIN — METOPROLOL TARTRATE 25 MG: 25 TABLET, FILM COATED ORAL at 21:14

## 2020-06-17 RX ADMIN — OXYCODONE HYDROCHLORIDE 10 MG: 10 TABLET, FILM COATED, EXTENDED RELEASE ORAL at 08:46

## 2020-06-17 RX ADMIN — CLONIDINE HYDROCHLORIDE 0.1 MG: 0.1 TABLET ORAL at 08:47

## 2020-06-17 RX ADMIN — INSULIN LISPRO 2 UNITS: 100 INJECTION, SOLUTION INTRAVENOUS; SUBCUTANEOUS at 16:14

## 2020-06-17 RX ADMIN — AMLODIPINE BESYLATE 5 MG: 5 TABLET ORAL at 08:47

## 2020-06-17 RX ADMIN — GABAPENTIN 400 MG: 400 CAPSULE ORAL at 13:52

## 2020-06-17 RX ADMIN — OXYCODONE HYDROCHLORIDE 10 MG: 10 TABLET ORAL at 09:54

## 2020-06-17 RX ADMIN — GABAPENTIN 400 MG: 400 CAPSULE ORAL at 21:13

## 2020-06-17 RX ADMIN — METOPROLOL TARTRATE 25 MG: 25 TABLET, FILM COATED ORAL at 08:46

## 2020-06-17 RX ADMIN — LINEZOLID 600 MG: 600 TABLET, FILM COATED ORAL at 21:13

## 2020-06-17 RX ADMIN — ENOXAPARIN SODIUM 40 MG: 40 INJECTION SUBCUTANEOUS at 21:12

## 2020-06-17 RX ADMIN — ACETAMINOPHEN 650 MG: 325 TABLET ORAL at 13:53

## 2020-06-17 RX ADMIN — MICONAZOLE NITRATE: 20.6 POWDER TOPICAL at 08:47

## 2020-06-17 RX ADMIN — ACETAMINOPHEN 650 MG: 325 TABLET ORAL at 09:55

## 2020-06-17 RX ADMIN — SODIUM CHLORIDE, PRESERVATIVE FREE 300 UNITS: 5 INJECTION INTRAVENOUS at 22:41

## 2020-06-17 RX ADMIN — CILOSTAZOL 50 MG: 50 TABLET ORAL at 21:13

## 2020-06-17 RX ADMIN — TIZANIDINE 2 MG: 4 TABLET ORAL at 08:47

## 2020-06-17 RX ADMIN — Medication 10 ML: at 22:41

## 2020-06-17 RX ADMIN — PETROLATUM: 42 OINTMENT TOPICAL at 08:47

## 2020-06-17 RX ADMIN — BISACODYL 5 MG: 5 TABLET, COATED ORAL at 21:14

## 2020-06-17 RX ADMIN — TIZANIDINE 2 MG: 4 TABLET ORAL at 16:14

## 2020-06-17 RX ADMIN — DULOXETINE HYDROCHLORIDE 120 MG: 60 CAPSULE, DELAYED RELEASE ORAL at 08:47

## 2020-06-17 RX ADMIN — CLONIDINE HYDROCHLORIDE 0.1 MG: 0.1 TABLET ORAL at 21:13

## 2020-06-17 ASSESSMENT — PAIN SCALES - GENERAL
PAINLEVEL_OUTOF10: 7
PAINLEVEL_OUTOF10: 0
PAINLEVEL_OUTOF10: 8
PAINLEVEL_OUTOF10: 0
PAINLEVEL_OUTOF10: 8
PAINLEVEL_OUTOF10: 4
PAINLEVEL_OUTOF10: 8
PAINLEVEL_OUTOF10: 0
PAINLEVEL_OUTOF10: 4
PAINLEVEL_OUTOF10: 5

## 2020-06-17 ASSESSMENT — PAIN DESCRIPTION - PAIN TYPE
TYPE: ACUTE PAIN

## 2020-06-17 ASSESSMENT — PAIN DESCRIPTION - PROGRESSION
CLINICAL_PROGRESSION: GRADUALLY IMPROVING
CLINICAL_PROGRESSION: GRADUALLY IMPROVING
CLINICAL_PROGRESSION: RAPIDLY IMPROVING

## 2020-06-17 ASSESSMENT — PAIN DESCRIPTION - LOCATION
LOCATION: LEG

## 2020-06-17 ASSESSMENT — PAIN DESCRIPTION - ORIENTATION
ORIENTATION: RIGHT

## 2020-06-17 NOTE — PROGRESS NOTES
Physical Therapy    Facility/Department: [facility] 5S REHAB  Treatment Note   NAME: Garrick Schreiber  : 1955  MRN: [id number]    Date of Service: 2020     Evaluating Therapist: Devota Hodgkins, PT, DPT    ROOM: [facility]  DIAGNOSIS: R AKA  PRECAUTIONS: falls, R AKA, contact isolation a  due to MRSA of R LE wound  HPI: Pt has hx AKA in 2018. Pt admitted to [facility] due to residual limb infection, underwent R Leg I&D 2020,   R Thigh Wound Dressing Change, Debridement, Removal of Muscle 2020,   Excisional debridement of the infected necrotic wound right groin and right thigh 2020. Pt discharged to [facility] specialty hosptial 20. Social:  Pt lives with alone in a 1 floor plan 1 steps without rails to enter. Pt reports having HH aide present x 3 hrs daily to assist with homemaking/ADLs  Prior to admission: Pt ambulated short distances with [employer] and R prosthetic, reports having WC and performing majority of ADLs/activities in home at [facility] level     Initial Evaluation  20 AM     PM  Short Term Goals Long Term Goals    Was pt agreeable to Eval/treatment? Yes Yes  Yes      Does pt have pain?  Pt c/o significant pain at R residual limb R residual limb pain  R residual limb pain       Bed Mobility  Rolling: Mila  Supine to sit: Mila  Sit to supine: Mila  Scooting: Mila Sit to supine mod I    NT    SBA Modified Independent   Transfers Sit to stand: ModA from raised EOM  Stand to sit: ModA  Stand pivot: ModA with Bariatric [employer]    Sliding board transfer Mila from Juan Jose Schwab table Sit to stand Close supervision   Stand to sit close supervision   Stand pivot with bariatric ww with SBA Sit to stand SBA  Stand to sit  SBA  Stand pivot with bariatric wwSBA   SBA Modified Independent   Ambulation    TBA NT 20 feet with bariatric ww with CG/SBA   10 feet with bariatric WW with SBA 50 feet with Bariatric WW with Supervision   Wheel Chair Mobility 150 feet with BUE with SBA NT NT      Car Transfers TBA NT NT Mila SBA   Stair

## 2020-06-17 NOTE — PROGRESS NOTES
[address] [place] [address] [facility]  Progress Note    SUBJECTIVE:  No chief complaint on file. Patient is seen and examined at bedside. He is sitting at the side of the bed onto the chair. Family present at bedside. He is awake and alert. Denies any fevers or chills. Denies any shortness of breath or chest pain. Patient is tolerating medications. No reported adverse drug reactions. No nausea, vomiting, diarrhea. Afebrile    Review of systems:  As stated above in the chief complaint, otherwise negative. Medications:  Scheduled Meds:   linezolid  600 mg Oral 2 times per day    oxyCODONE  10 mg Oral 2 times per day    tiZANidine  2 mg Oral TID    lidocaine  1 patch Transdermal Daily    gabapentin  400 mg Oral TID    mineral oil-hydrophilic petrolatum   Topical BID    amLODIPine  5 mg Oral Daily    atorvastatin  10 mg Oral Daily    bisacodyl  5 mg Oral BID    cilostazol  50 mg Oral BID    cloNIDine  0.1 mg Oral BID    DULoxetine  120 mg Oral Daily    enoxaparin  40 mg Subcutaneous BID    insulin lispro  0-12 Units Subcutaneous TID WC    insulin lispro  0-6 Units Subcutaneous Nightly    metoprolol tartrate  25 mg Oral BID    miconazole   Topical BID    heparin flush  300 Units Intracatheter BID    sodium chloride flush  10 mL Intravenous 2 times per day     Continuous Infusions:   dextrose       PRN Meds:ammonium lactate, oxyCODONE **OR** oxyCODONE, glucose, dextrose, glucagon (rDNA), dextrose, acetaminophen, polyethylene glycol, acetaminophen    OBJECTIVE:  BP (!) 168/99   Pulse 70   Temp 97.7 °F (36.5 °C) (Temporal)   Resp 16   Ht 6' 2\" (1.88 m)   Wt 244 lb 3.2 oz (110.8 kg)   SpO2 99%   BMI 31.35 kg/m²   Temp  Av.7 °F (36.5 °C)  Min: 97.7 °F (36.5 °C)  Max: 97.7 °F (36.5 °C)  Constitutional: The patient is awake, alert, and oriented. Skin: Warm and dry. No rashes were noted. HEENT: Round and reactive pupils. Moist mucous membranes.   No ulcerations or

## 2020-06-17 NOTE — PROGRESS NOTES
5 mg Oral BID Helena Valdes MD   5 mg at 06/16/20 2103    cilostazol (PLETAL) tablet 50 mg  50 mg Oral BID Helena Valdes MD   50 mg at 06/16/20 2103    cloNIDine (CATAPRES) tablet 0.1 mg  0.1 mg Oral BID Helena Valdes MD   0.1 mg at 06/16/20 2103    DULoxetine (CYMBALTA) extended release capsule 120 mg  120 mg Oral Daily Eddie Gomez MD   120 mg at 06/16/20 0834    enoxaparin (LOVENOX) injection 40 mg  40 mg Subcutaneous BID Helena Valdes MD   40 mg at 06/16/20 2103    insulin lispro (HUMALOG) injection vial 0-12 Units  0-12 Units Subcutaneous TID WC Helena Valdes MD   4 Units at 06/16/20 1608    insulin lispro (HUMALOG) injection vial 0-6 Units  0-6 Units Subcutaneous Nightly Helena Valdes MD   3 Units at 06/16/20 2101    metoprolol tartrate (LOPRESSOR) tablet 25 mg  25 mg Oral BID Helena Valdes MD   25 mg at 06/16/20 2103    miconazole (MICOTIN) 2 % powder   Topical BID Helena Valdes MD        heparin flush 100 UNIT/ML injection 300 Units  300 Units Intracatheter BID Helena Valdes MD   300 Units at 06/16/20 2205    sodium chloride flush 0.9 % injection 10 mL  10 mL Intravenous 2 times per day Helena Valdes MD   10 mL at 06/16/20 2204     No Known Allergies  Active Problems:    Above knee am[facility] right lower [facility] ([facility]  Resolved Problems:    * No resolved hospital problems. *    Blood pressure (!) 168/99, pulse 70, temperature 97.7 °F (36.5 °C), temperature source Temporal, resp. rate 16, height 6' 2\" (1.88 m), weight 244 lb 3.2 oz (110.8 kg), SpO2 99 %. Subjective:  Symptoms:  Stable. He reports weakness. Diet:  Adequate intake. Activity level: Impaired due to weakness. Pain:  He complains of pain that is mild. Objective:  General Appearance: In no acute distress. Vital signs: (most recent): Blood pressure (!) 168/99, pulse 70, temperature 97.7 °F (36.5 °C), temperature source Temporal, resp.  rate 16, height 6' 2\" (1.88 m), weight 244 lb

## 2020-06-18 LAB
ALBUMIN SERPL-MCNC: 2.9 G/DL (ref 3.5–5.2)
ALP BLD-CCNC: 82 U/L (ref 40–129)
ALT SERPL-CCNC: 16 U/L (ref 0–40)
ANION GAP SERPL CALCULATED.3IONS-SCNC: 11 MMOL/L (ref 7–16)
AST SERPL-CCNC: 8 U/L (ref 0–39)
BASOPHILS ABSOLUTE: 0.06 E9/L (ref 0–0.2)
BASOPHILS RELATIVE PERCENT: 0.7 % (ref 0–2)
BILIRUB SERPL-MCNC: 0.2 MG/DL (ref 0–1.2)
BUN BLDV-MCNC: 19 MG/DL (ref 8–23)
CALCIUM SERPL-MCNC: 8.8 MG/DL (ref 8.6–10.2)
CHLORIDE BLD-SCNC: 103 MMOL/L (ref 98–107)
CO2: 27 MMOL/L (ref 22–29)
CREAT SERPL-MCNC: 1.9 MG/DL (ref 0.7–1.2)
EOSINOPHILS ABSOLUTE: 0.36 E9/L (ref 0.05–0.5)
EOSINOPHILS RELATIVE PERCENT: 4.2 % (ref 0–6)
GFR AFRICAN AMERICAN: 44
GFR NON-AFRICAN AMERICAN: 44 ML/MIN/1.73
GLUCOSE BLD-MCNC: 130 MG/DL (ref 74–99)
HCT VFR BLD CALC: 26.3 % (ref 37–54)
HEMOGLOBIN: 8.2 G/DL (ref 12.5–16.5)
IMMATURE GRANULOCYTES #: 0.04 E9/L
IMMATURE GRANULOCYTES %: 0.5 % (ref 0–5)
LYMPHOCYTES ABSOLUTE: 2.23 E9/L (ref 1.5–4)
LYMPHOCYTES RELATIVE PERCENT: 25.8 % (ref 20–42)
MCH RBC QN AUTO: 26.3 PG (ref 26–35)
MCHC RBC AUTO-ENTMCNC: 31.2 % (ref 32–34.5)
MCV RBC AUTO: 84.3 FL (ref 80–99.9)
METER GLUCOSE: 130 MG/DL (ref 74–99)
METER GLUCOSE: 132 MG/DL (ref 74–99)
METER GLUCOSE: 135 MG/DL (ref 74–99)
METER GLUCOSE: 174 MG/DL (ref 74–99)
MONOCYTES ABSOLUTE: 0.62 E9/L (ref 0.1–0.95)
MONOCYTES RELATIVE PERCENT: 7.2 % (ref 2–12)
NEUTROPHILS ABSOLUTE: 5.33 E9/L (ref 1.8–7.3)
NEUTROPHILS RELATIVE PERCENT: 61.6 % (ref 43–80)
PDW BLD-RTO: 13.5 FL (ref 11.5–15)
PLATELET # BLD: 286 E9/L (ref 130–450)
PMV BLD AUTO: 10.5 FL (ref 7–12)
POTASSIUM SERPL-SCNC: 4.2 MMOL/L (ref 3.5–5)
RBC # BLD: 3.12 E12/L (ref 3.8–5.8)
SODIUM BLD-SCNC: 141 MMOL/L (ref 132–146)
TOTAL PROTEIN: 7.3 G/DL (ref 6.4–8.3)
WBC # BLD: 8.6 E9/L (ref 4.5–11.5)

## 2020-06-18 PROCEDURE — 6370000000 HC RX 637 (ALT 250 FOR IP): Performed by: PHYSICAL MEDICINE & REHABILITATION

## 2020-06-18 PROCEDURE — 36415 COLL VENOUS BLD VENIPUNCTURE: CPT

## 2020-06-18 PROCEDURE — 36592 COLLECT BLOOD FROM PICC: CPT

## 2020-06-18 PROCEDURE — 80053 COMPREHEN METABOLIC PANEL: CPT

## 2020-06-18 PROCEDURE — 6370000000 HC RX 637 (ALT 250 FOR IP): Performed by: SPECIALIST

## 2020-06-18 PROCEDURE — 2580000003 HC RX 258: Performed by: PHYSICAL MEDICINE & REHABILITATION

## 2020-06-18 PROCEDURE — 1280000000 HC REHAB R&B

## 2020-06-18 PROCEDURE — 85025 COMPLETE CBC W/AUTO DIFF WBC: CPT

## 2020-06-18 PROCEDURE — 97110 THERAPEUTIC EXERCISES: CPT

## 2020-06-18 PROCEDURE — 97530 THERAPEUTIC ACTIVITIES: CPT

## 2020-06-18 PROCEDURE — 6360000002 HC RX W HCPCS: Performed by: PHYSICAL MEDICINE & REHABILITATION

## 2020-06-18 PROCEDURE — 82962 GLUCOSE BLOOD TEST: CPT

## 2020-06-18 RX ADMIN — OXYCODONE HYDROCHLORIDE 10 MG: 10 TABLET ORAL at 13:29

## 2020-06-18 RX ADMIN — LINEZOLID 600 MG: 600 TABLET, FILM COATED ORAL at 22:04

## 2020-06-18 RX ADMIN — CILOSTAZOL 50 MG: 50 TABLET ORAL at 22:04

## 2020-06-18 RX ADMIN — Medication: at 22:11

## 2020-06-18 RX ADMIN — MICONAZOLE NITRATE: 20.6 POWDER TOPICAL at 08:27

## 2020-06-18 RX ADMIN — MICONAZOLE NITRATE: 20.6 POWDER TOPICAL at 22:10

## 2020-06-18 RX ADMIN — OXYCODONE HYDROCHLORIDE 10 MG: 10 TABLET, FILM COATED, EXTENDED RELEASE ORAL at 08:25

## 2020-06-18 RX ADMIN — ATORVASTATIN CALCIUM 10 MG: 10 TABLET, FILM COATED ORAL at 08:25

## 2020-06-18 RX ADMIN — Medication 10 ML: at 08:26

## 2020-06-18 RX ADMIN — TIZANIDINE 2 MG: 4 TABLET ORAL at 13:29

## 2020-06-18 RX ADMIN — LINEZOLID 600 MG: 600 TABLET, FILM COATED ORAL at 08:25

## 2020-06-18 RX ADMIN — TIZANIDINE 2 MG: 4 TABLET ORAL at 08:25

## 2020-06-18 RX ADMIN — DULOXETINE HYDROCHLORIDE 120 MG: 60 CAPSULE, DELAYED RELEASE ORAL at 08:25

## 2020-06-18 RX ADMIN — INSULIN LISPRO 2 UNITS: 100 INJECTION, SOLUTION INTRAVENOUS; SUBCUTANEOUS at 12:09

## 2020-06-18 RX ADMIN — CLONIDINE HYDROCHLORIDE 0.1 MG: 0.1 TABLET ORAL at 08:25

## 2020-06-18 RX ADMIN — GABAPENTIN 400 MG: 400 CAPSULE ORAL at 08:26

## 2020-06-18 RX ADMIN — METOPROLOL TARTRATE 25 MG: 25 TABLET, FILM COATED ORAL at 08:25

## 2020-06-18 RX ADMIN — PETROLATUM: 42 OINTMENT TOPICAL at 08:27

## 2020-06-18 RX ADMIN — Medication: at 08:26

## 2020-06-18 RX ADMIN — PETROLATUM: 42 OINTMENT TOPICAL at 22:11

## 2020-06-18 RX ADMIN — TIZANIDINE 2 MG: 4 TABLET ORAL at 22:03

## 2020-06-18 RX ADMIN — METOPROLOL TARTRATE 25 MG: 25 TABLET, FILM COATED ORAL at 22:02

## 2020-06-18 RX ADMIN — Medication 10 ML: at 22:01

## 2020-06-18 RX ADMIN — CILOSTAZOL 50 MG: 50 TABLET ORAL at 08:26

## 2020-06-18 RX ADMIN — SODIUM CHLORIDE, PRESERVATIVE FREE 300 UNITS: 5 INJECTION INTRAVENOUS at 22:01

## 2020-06-18 RX ADMIN — ENOXAPARIN SODIUM 40 MG: 40 INJECTION SUBCUTANEOUS at 08:26

## 2020-06-18 RX ADMIN — OXYCODONE HYDROCHLORIDE 10 MG: 10 TABLET, FILM COATED, EXTENDED RELEASE ORAL at 22:02

## 2020-06-18 RX ADMIN — ENOXAPARIN SODIUM 40 MG: 40 INJECTION SUBCUTANEOUS at 22:02

## 2020-06-18 RX ADMIN — CLONIDINE HYDROCHLORIDE 0.1 MG: 0.1 TABLET ORAL at 22:04

## 2020-06-18 RX ADMIN — GABAPENTIN 400 MG: 400 CAPSULE ORAL at 22:04

## 2020-06-18 RX ADMIN — GABAPENTIN 400 MG: 400 CAPSULE ORAL at 13:29

## 2020-06-18 RX ADMIN — AMLODIPINE BESYLATE 5 MG: 5 TABLET ORAL at 08:26

## 2020-06-18 ASSESSMENT — PAIN DESCRIPTION - DESCRIPTORS: DESCRIPTORS: ACHING;DISCOMFORT

## 2020-06-18 ASSESSMENT — PAIN SCALES - GENERAL
PAINLEVEL_OUTOF10: 0
PAINLEVEL_OUTOF10: 8
PAINLEVEL_OUTOF10: 0
PAINLEVEL_OUTOF10: 8
PAINLEVEL_OUTOF10: 8
PAINLEVEL_OUTOF10: 4
PAINLEVEL_OUTOF10: 0
PAINLEVEL_OUTOF10: 0

## 2020-06-18 ASSESSMENT — PAIN DESCRIPTION - FREQUENCY: FREQUENCY: OTHER (COMMENT)

## 2020-06-18 ASSESSMENT — PAIN DESCRIPTION - PAIN TYPE
TYPE: ACUTE PAIN
TYPE: ACUTE PAIN

## 2020-06-18 ASSESSMENT — PAIN DESCRIPTION - LOCATION
LOCATION: LEG
LOCATION: LEG

## 2020-06-18 ASSESSMENT — PAIN DESCRIPTION - ORIENTATION
ORIENTATION: RIGHT
ORIENTATION: RIGHT

## 2020-06-18 NOTE — PROGRESS NOTES
Physical Therapy    Facility/Department: [address] REHAB  Treatment Note   NAME: Garrick Schreiber  : 1955  MRN: [id number]    Date of Service: 2020     Evaluating Therapist: Devota Hodgkins, PT, DPT    ROOM: 5892X  DIAGNOSIS: R AKA  PRECAUTIONS: falls, R AKA, contact isolation a  due to MRSA of R LE wound  HPI: Pt has hx AKA in 2018. Pt admitted to [facility] due to residual limb infection, underwent R Leg I&D 2020,   R Thigh Wound Dressing Change, Debridement, Removal of Muscle 2020,   Excisional debridement of the infected necrotic wound right groin and right thigh 2020. Pt discharged to [facility] specialty hosptial 20. Social:  Pt lives with alone in a 1 floor plan 1 steps without rails to enter. Pt reports having HH aide present x 3 hrs daily to assist with homemaking/ADLs  Prior to admission: Pt ambulated short distances with [employer] and R prosthetic, reports having WC and performing majority of ADLs/activities in home at [facility] level     Initial Evaluation  20 AM     PM  Short Term Goals Long Term Goals    Was pt agreeable to Eval/treatment? Yes Yes  Yes, see comments       Does pt have pain?  Pt c/o significant pain at R residual limb R residual limb pain (3/10)  None      Bed Mobility  Rolling: Mila  Supine to sit: Mila  Sit to supine: Mila  Scooting: Mila NT    NT    SBA Modified Independent   Transfers Sit to stand: ModA from raised EOM  Stand to sit: ModA  Stand pivot: ModA with Bariatric [employer]    Sliding board transfer Mila from Juan Jose Schwab table Sit to stand Close supervision   Stand to sit close supervision    Sit to stand SBA  Stand to sit  SBA  Stand pivot with bariatric wwSBA   SBA Modified Independent   Ambulation    TBA 10 feet with bariatric ww with SBA 10-20 feet with bariatric ww with CG/SBA ( see comments)    10 feet with bariatric WW with SBA 50 feet with Bariatric WW with Supervision   Wheel Chair Mobility 150 feet with BUE with SBA Pt able to propel self in wc with B UE around

## 2020-06-18 NOTE — PROGRESS NOTES
OCCUPATIONAL THERAPY DAILY NOTE    Date:2020  Patient Name: Franky Ku  MRN: [id number]  : 1955  Room: [address] [place] [address]     Referring Practitioner: Elise Thomson MD  Diagnosis: RAKA- RLE I&D 2020; removeal of muscle RLE 2020  Additional Pertinent Hx: DM, CKD, HTN, HLD, RAGHU & Obesity    Precautions:  Fall risk, R AKA     Functional Assessment:   Date Status AE  Comments   Feeding 20  Independent      Grooming 20 Supervision  Seated on EOB    Bathing 20 UE bathing:Set up   LE bathing:min A      UB Dressing 20 Independent      LB Dressing 20 MIN A            Homemaking 20 SBA **has aides to complete homemaking       Functional Transfers / Balance:   Date Status DME  Comments   Sit Balance 20  supervision    demo'd sitting EOB    Stand Balance 20 CGA w/w demo'd during transfers and ambulation    [x] Tub  [] Shower   Transfer 20 CGA   Extended tub bench, w/w     Commode   Transfer 20 CGA Drop arm bariatric BSC, ww    Functional   Mobility 20 CGA      Mod I  w/w      W/c propulsion On various surfaces short house hold distances noting 1 LOB MIN A to recover     Outside on various surfaces    Other: supine <> EOB       Sit <> stand  20 CGA        SBA   Bed rail       w/w       Functional Exercises / Activity:  Pt engaged in B UE ex's focusing on UE strength/endurance to increase independence with transfers/mobility and ADL tasks;   3# dumb bells in all planes 4 x 10 reps; Power gripper 3 x 25 reps B UE;   W/c push ups 2 x 5 reps; Sensory / Neuromuscular Re-Education:      Cognitive Skills:   Status Comments   Problem   Solving fair + demo'd during ADL tasks     Memory fair + demo'd during transfers   Sequencing fair + \"   Safety fair + \"      Visual Perception:    Education:  Pt educated with regards to safety during EOB to w/c transfers using std. Walker using LLE hopping over method.  .     [x] Family teach

## 2020-06-19 LAB
METER GLUCOSE: 145 MG/DL (ref 74–99)
METER GLUCOSE: 149 MG/DL (ref 74–99)
METER GLUCOSE: 184 MG/DL (ref 74–99)
METER GLUCOSE: 191 MG/DL (ref 74–99)

## 2020-06-19 PROCEDURE — 6370000000 HC RX 637 (ALT 250 FOR IP): Performed by: SPECIALIST

## 2020-06-19 PROCEDURE — 6360000002 HC RX W HCPCS: Performed by: PHYSICAL MEDICINE & REHABILITATION

## 2020-06-19 PROCEDURE — 2580000003 HC RX 258: Performed by: PHYSICAL MEDICINE & REHABILITATION

## 2020-06-19 PROCEDURE — 1280000000 HC REHAB R&B

## 2020-06-19 PROCEDURE — 82962 GLUCOSE BLOOD TEST: CPT

## 2020-06-19 PROCEDURE — 6370000000 HC RX 637 (ALT 250 FOR IP): Performed by: PHYSICAL MEDICINE & REHABILITATION

## 2020-06-19 RX ORDER — ONDANSETRON 4 MG/1
4 TABLET, ORALLY DISINTEGRATING ORAL EVERY 8 HOURS PRN
Status: DISCONTINUED | OUTPATIENT
Start: 2020-06-19 | End: 2020-06-23 | Stop reason: HOSPADM

## 2020-06-19 RX ADMIN — CLONIDINE HYDROCHLORIDE 0.1 MG: 0.1 TABLET ORAL at 08:56

## 2020-06-19 RX ADMIN — SODIUM CHLORIDE, PRESERVATIVE FREE 300 UNITS: 5 INJECTION INTRAVENOUS at 08:46

## 2020-06-19 RX ADMIN — ENOXAPARIN SODIUM 40 MG: 40 INJECTION SUBCUTANEOUS at 08:47

## 2020-06-19 RX ADMIN — METOPROLOL TARTRATE 25 MG: 25 TABLET, FILM COATED ORAL at 20:21

## 2020-06-19 RX ADMIN — SODIUM CHLORIDE, PRESERVATIVE FREE 300 UNITS: 5 INJECTION INTRAVENOUS at 22:42

## 2020-06-19 RX ADMIN — ATORVASTATIN CALCIUM 10 MG: 10 TABLET, FILM COATED ORAL at 08:47

## 2020-06-19 RX ADMIN — ONDANSETRON 4 MG: 4 TABLET, ORALLY DISINTEGRATING ORAL at 11:22

## 2020-06-19 RX ADMIN — CLONIDINE HYDROCHLORIDE 0.1 MG: 0.1 TABLET ORAL at 20:20

## 2020-06-19 RX ADMIN — OXYCODONE HYDROCHLORIDE 10 MG: 10 TABLET, FILM COATED, EXTENDED RELEASE ORAL at 08:48

## 2020-06-19 RX ADMIN — DULOXETINE HYDROCHLORIDE 120 MG: 60 CAPSULE, DELAYED RELEASE ORAL at 08:47

## 2020-06-19 RX ADMIN — LINEZOLID 600 MG: 600 TABLET, FILM COATED ORAL at 08:48

## 2020-06-19 RX ADMIN — INSULIN LISPRO 2 UNITS: 100 INJECTION, SOLUTION INTRAVENOUS; SUBCUTANEOUS at 06:28

## 2020-06-19 RX ADMIN — OXYCODONE HYDROCHLORIDE 10 MG: 10 TABLET, FILM COATED, EXTENDED RELEASE ORAL at 20:21

## 2020-06-19 RX ADMIN — CILOSTAZOL 50 MG: 50 TABLET ORAL at 20:20

## 2020-06-19 RX ADMIN — LINEZOLID 600 MG: 600 TABLET, FILM COATED ORAL at 20:20

## 2020-06-19 RX ADMIN — AMLODIPINE BESYLATE 5 MG: 5 TABLET ORAL at 08:48

## 2020-06-19 RX ADMIN — METOPROLOL TARTRATE 25 MG: 25 TABLET, FILM COATED ORAL at 08:48

## 2020-06-19 RX ADMIN — GABAPENTIN 400 MG: 400 CAPSULE ORAL at 08:47

## 2020-06-19 RX ADMIN — TIZANIDINE 2 MG: 4 TABLET ORAL at 08:47

## 2020-06-19 RX ADMIN — TIZANIDINE 2 MG: 4 TABLET ORAL at 20:20

## 2020-06-19 RX ADMIN — Medication 10 ML: at 08:45

## 2020-06-19 RX ADMIN — Medication 10 ML: at 22:41

## 2020-06-19 RX ADMIN — INSULIN LISPRO 2 UNITS: 100 INJECTION, SOLUTION INTRAVENOUS; SUBCUTANEOUS at 11:24

## 2020-06-19 RX ADMIN — CILOSTAZOL 50 MG: 50 TABLET ORAL at 08:48

## 2020-06-19 RX ADMIN — ENOXAPARIN SODIUM 40 MG: 40 INJECTION SUBCUTANEOUS at 20:21

## 2020-06-19 RX ADMIN — GABAPENTIN 400 MG: 400 CAPSULE ORAL at 20:20

## 2020-06-19 ASSESSMENT — PAIN SCALES - GENERAL
PAINLEVEL_OUTOF10: 7
PAINLEVEL_OUTOF10: 8
PAINLEVEL_OUTOF10: 0

## 2020-06-19 ASSESSMENT — PAIN DESCRIPTION - DESCRIPTORS
DESCRIPTORS: CONSTANT;DISCOMFORT;ACHING
DESCRIPTORS: CONSTANT;DISCOMFORT

## 2020-06-19 ASSESSMENT — PAIN DESCRIPTION - LOCATION
LOCATION: LEG
LOCATION: LEG

## 2020-06-19 ASSESSMENT — PAIN DESCRIPTION - ONSET
ONSET: ON-GOING
ONSET: ON-GOING

## 2020-06-19 ASSESSMENT — PAIN DESCRIPTION - PAIN TYPE
TYPE: ACUTE PAIN
TYPE: ACUTE PAIN

## 2020-06-19 ASSESSMENT — PAIN DESCRIPTION - ORIENTATION
ORIENTATION: RIGHT
ORIENTATION: RIGHT

## 2020-06-19 NOTE — PROGRESS NOTES
[address] [place] [address] [facility]  Progress Note    SUBJECTIVE:  No chief complaint on file. Patient is seen and examined at bedside. He is sitting at the side of the bed onto the chair. Set up to eat lunch. He is awake and alert. Denies any fevers or chills. Denies any shortness of breath or chest pain. Patient is tolerating medications. No reported adverse drug reactions. No nausea, vomiting, diarrhea. Afebrile    Review of systems:  As stated above in the chief complaint, otherwise negative. Medications:  Scheduled Meds:   linezolid  600 mg Oral 2 times per day    oxyCODONE  10 mg Oral 2 times per day    tiZANidine  2 mg Oral TID    lidocaine  1 patch Transdermal Daily    gabapentin  400 mg Oral TID    mineral oil-hydrophilic petrolatum   Topical BID    amLODIPine  5 mg Oral Daily    atorvastatin  10 mg Oral Daily    cilostazol  50 mg Oral BID    cloNIDine  0.1 mg Oral BID    DULoxetine  120 mg Oral Daily    enoxaparin  40 mg Subcutaneous BID    insulin lispro  0-12 Units Subcutaneous TID WC    insulin lispro  0-6 Units Subcutaneous Nightly    metoprolol tartrate  25 mg Oral BID    miconazole   Topical BID    heparin flush  300 Units Intracatheter BID    sodium chloride flush  10 mL Intravenous 2 times per day     Continuous Infusions:   dextrose       PRN Meds:ondansetron, bisacodyl, ammonium lactate, oxyCODONE **OR** oxyCODONE, glucose, dextrose, glucagon (rDNA), dextrose, acetaminophen, polyethylene glycol, acetaminophen    OBJECTIVE:  BP (!) 163/87   Pulse 69   Temp 98.7 °F (37.1 °C) (Oral)   Resp 16   Ht 6' 2\" (1.88 m)   Wt 244 lb 3.2 oz (110.8 kg)   SpO2 99%   BMI 31.35 kg/m²   Temp  Av.7 °F (37.1 °C)  Min: 98.7 °F (37.1 °C)  Max: 98.7 °F (37.1 °C)  Constitutional: The patient is awake, alert, and oriented. Skin: Warm and dry. No rashes were noted. HEENT: Round and reactive pupils. Moist mucous membranes. No ulcerations or thrush.   Neck:

## 2020-06-19 NOTE — PLAN OF CARE
of new skin breakdown  Description: Absence of new skin breakdown  6/19/2020 1525 by Romero Huang  Outcome: Met This Shift  6/19/2020 0432 by Kerry Jones RN  Outcome: Met This Shift     Problem: Pain:  Goal: Pain level will decrease  Description: Pain level will decrease  6/19/2020 1525 by Romero Huang  Outcome: Met This Shift  6/19/2020 0432 by Kerry Jones RN  Outcome: Met This Shift  Goal: Control of acute pain  Description: Control of acute pain  Outcome: Met This Shift  Goal: Control of chronic pain  Description: Control of chronic pain  Outcome: Met This Shift     Problem: Musculor/Skeletal Functional Status  Goal: Absence of falls  6/19/2020 1525 by Romero Huang  Outcome: Met This Shift  6/19/2020 0432 by Kerry Jones RN  Outcome: Met This Shift     Problem: Musculor/Skeletal Functional Status  Goal: Highest potential functional level  Outcome: Not Met This Shift

## 2020-06-19 NOTE — PROGRESS NOTES
OCCUPATIONAL THERAPY DAILY NOTE    Date:2020  Patient Name: Nicki Oneil  MRN: [id number]  : 1955  Room: [address] [place] [address]     Referring Practitioner: Verena Franklin MD  Diagnosis: RAKA- RLE I&D 2020; removeal of muscle RLE 2020  Additional Pertinent Hx: DM, CKD, HTN, HLD, RAGHU & Obesity    Precautions:  Fall risk, R AKA     Functional Assessment:   Date Status AE  Comments   Feeding 20  Independent      Grooming 20 Supervision  Seated on EOB    Bathing 20 UE bathing:Set up   LE bathing:min A      UB Dressing 20 Independent      LB Dressing 20 MIN A            Homemaking 20 SBA **has aides to complete homemaking       Functional Transfers / Balance:   Date Status DME  Comments   Sit Balance 20  supervision      Stand Balance 20 CGA w/w    [x] Tub  [] Shower   Transfer 20 CGA   Extended tub bench, w/w     Commode   Transfer 20 CGA Drop arm bariatric BSC, ww    Functional   Mobility 20 CGA      Mod I  w/w      W/c propulsion    Other: supine <> EOB       Sit <> stand  20 CGA        SBA   Bed rail       w/w       Functional Exercises / Activity:      Sensory / Neuromuscular Re-Education:      Cognitive Skills:   Status Comments   Problem   Solving fair + demo'd during ADL tasks     Memory fair + demo'd during transfers   Sequencing fair + \"   Safety fair + \"      Visual Perception:    Education:  Pt educated with regards to safety during EOB to w/c transfers using std. Walker using LLE hopping over method.  .     [x] Family teach completed on: 20 pt and girlfriend present for therapy discussed assist levels for pt, girlfriend demo'd fair understanding verbally, observed pt complete tub transfers discussion on safety, maintaining standing balance, proximity to pt during transfers for safety;     Pain Level: 0/10    Additional Notes:   20 Pt tearful this am since did not have any clothes and emotional in general with this therapist offering him support. Patient has made fair+ progress during treatment sessions toward set goals. Therapy emphasis to obtain goals: Strengthening, Wheelchair Mobility Training, Positioning, Gait Training, Safety Education & Training, Balance Training, Patient/Caregiver Education & Training, Self-Care / ADL, Functional Mobility Training, Equipment Evaluation, Education, & procurement, Home Management Training, Endurance Training      [x] Continue with current OT Plan of care. [] Prepare for Discharge     DISCHARGE RECOMMENDATIONS  Recommended DME:    Post Discharge Care:   []Home Independently  [x]Home with assist PRN [x]Home with nursing aide  [x]Home with Home Health OT []Home with Out Pt OT []Other: ___   Comments:         Time in Time out Tx Time Breakdown  Variance:   First Session  10:00am 10:45am [] Individual Tx-   [] Concurrent Tx -  [] Co-Tx -   [] Group Tx -   [x] Time Missed -45mins Attempted to see pt this session, speaking with nursing, placing of pt on hold due to pt stating on not feeling well and vomiting. Second Session 2:30pm 3:15pm [] Individual Tx-   [] Concurrent Tx -   [] Co-Tx - 45 Mins  [] Group Tx -   [] Time Missed - 45mins   Pt refusing therapy services this session, stating of still not feeling well   Third Session    [] Individual Tx-   [] Concurrent Tx -  [] Co-Tx -   [] Group Tx -   [] Time Missed -         Total Tx Time:0 mins       Yamilex Donald  BUCK/L [id number]  I have read the above note and agree with the documentation.   Iván Casas OTR/L [id number]

## 2020-06-19 NOTE — PROGRESS NOTES
Assessment:    Condition: In stable condition. Improving. (Right above-knee amputation). Plan:   Encourage ambulation and up to wheel chair.  (Working at wheelchair and walker level  Continues on p.o. Zyvox  Continues with packing and drainage from the right residual limb  Pain is adequately controlled).        Prudence MD Vijay  6/19/2020

## 2020-06-20 LAB
METER GLUCOSE: 126 MG/DL (ref 74–99)
METER GLUCOSE: 134 MG/DL (ref 74–99)
METER GLUCOSE: 147 MG/DL (ref 74–99)
METER GLUCOSE: 181 MG/DL (ref 74–99)

## 2020-06-20 PROCEDURE — 6360000002 HC RX W HCPCS: Performed by: PHYSICAL MEDICINE & REHABILITATION

## 2020-06-20 PROCEDURE — 97110 THERAPEUTIC EXERCISES: CPT

## 2020-06-20 PROCEDURE — 1280000000 HC REHAB R&B

## 2020-06-20 PROCEDURE — 97530 THERAPEUTIC ACTIVITIES: CPT

## 2020-06-20 PROCEDURE — 2580000003 HC RX 258: Performed by: PHYSICAL MEDICINE & REHABILITATION

## 2020-06-20 PROCEDURE — 82962 GLUCOSE BLOOD TEST: CPT

## 2020-06-20 PROCEDURE — 6370000000 HC RX 637 (ALT 250 FOR IP): Performed by: PHYSICAL MEDICINE & REHABILITATION

## 2020-06-20 PROCEDURE — 6370000000 HC RX 637 (ALT 250 FOR IP): Performed by: SPECIALIST

## 2020-06-20 RX ADMIN — TIZANIDINE 2 MG: 4 TABLET ORAL at 21:15

## 2020-06-20 RX ADMIN — SODIUM CHLORIDE, PRESERVATIVE FREE 300 UNITS: 5 INJECTION INTRAVENOUS at 22:42

## 2020-06-20 RX ADMIN — Medication 10 ML: at 09:47

## 2020-06-20 RX ADMIN — GABAPENTIN 400 MG: 400 CAPSULE ORAL at 09:22

## 2020-06-20 RX ADMIN — OXYCODONE HYDROCHLORIDE 10 MG: 10 TABLET, FILM COATED, EXTENDED RELEASE ORAL at 21:15

## 2020-06-20 RX ADMIN — AMLODIPINE BESYLATE 5 MG: 5 TABLET ORAL at 09:23

## 2020-06-20 RX ADMIN — INSULIN LISPRO 2 UNITS: 100 INJECTION, SOLUTION INTRAVENOUS; SUBCUTANEOUS at 11:34

## 2020-06-20 RX ADMIN — LINEZOLID 600 MG: 600 TABLET, FILM COATED ORAL at 12:48

## 2020-06-20 RX ADMIN — LINEZOLID 600 MG: 600 TABLET, FILM COATED ORAL at 21:15

## 2020-06-20 RX ADMIN — CILOSTAZOL 50 MG: 50 TABLET ORAL at 09:23

## 2020-06-20 RX ADMIN — GABAPENTIN 400 MG: 400 CAPSULE ORAL at 21:15

## 2020-06-20 RX ADMIN — METOPROLOL TARTRATE 25 MG: 25 TABLET, FILM COATED ORAL at 21:15

## 2020-06-20 RX ADMIN — TIZANIDINE 2 MG: 4 TABLET ORAL at 09:23

## 2020-06-20 RX ADMIN — OXYCODONE HYDROCHLORIDE 10 MG: 10 TABLET, FILM COATED, EXTENDED RELEASE ORAL at 09:22

## 2020-06-20 RX ADMIN — Medication 10 ML: at 22:43

## 2020-06-20 RX ADMIN — DULOXETINE HYDROCHLORIDE 120 MG: 60 CAPSULE, DELAYED RELEASE ORAL at 09:23

## 2020-06-20 RX ADMIN — GABAPENTIN 400 MG: 400 CAPSULE ORAL at 12:46

## 2020-06-20 RX ADMIN — CLONIDINE HYDROCHLORIDE 0.1 MG: 0.1 TABLET ORAL at 09:23

## 2020-06-20 RX ADMIN — ENOXAPARIN SODIUM 40 MG: 40 INJECTION SUBCUTANEOUS at 21:15

## 2020-06-20 RX ADMIN — CILOSTAZOL 50 MG: 50 TABLET ORAL at 21:15

## 2020-06-20 RX ADMIN — ENOXAPARIN SODIUM 40 MG: 40 INJECTION SUBCUTANEOUS at 09:22

## 2020-06-20 RX ADMIN — ATORVASTATIN CALCIUM 10 MG: 10 TABLET, FILM COATED ORAL at 09:24

## 2020-06-20 RX ADMIN — TIZANIDINE 2 MG: 4 TABLET ORAL at 12:46

## 2020-06-20 RX ADMIN — METOPROLOL TARTRATE 25 MG: 25 TABLET, FILM COATED ORAL at 09:24

## 2020-06-20 RX ADMIN — BISACODYL 5 MG: 5 TABLET, COATED ORAL at 09:23

## 2020-06-20 RX ADMIN — CLONIDINE HYDROCHLORIDE 0.1 MG: 0.1 TABLET ORAL at 21:15

## 2020-06-20 RX ADMIN — SODIUM CHLORIDE, PRESERVATIVE FREE 300 UNITS: 5 INJECTION INTRAVENOUS at 09:47

## 2020-06-20 RX ADMIN — OXYCODONE HYDROCHLORIDE 10 MG: 10 TABLET ORAL at 09:23

## 2020-06-20 ASSESSMENT — PAIN DESCRIPTION - PAIN TYPE
TYPE: ACUTE PAIN
TYPE: ACUTE PAIN

## 2020-06-20 ASSESSMENT — PAIN DESCRIPTION - LOCATION
LOCATION: LEG;HIP
LOCATION: HIP

## 2020-06-20 ASSESSMENT — PAIN DESCRIPTION - ONSET
ONSET: ON-GOING
ONSET: ON-GOING

## 2020-06-20 ASSESSMENT — PAIN DESCRIPTION - DESCRIPTORS
DESCRIPTORS: CONSTANT;DISCOMFORT
DESCRIPTORS: ACHING;CONSTANT;DISCOMFORT

## 2020-06-20 ASSESSMENT — PAIN DESCRIPTION - PROGRESSION
CLINICAL_PROGRESSION: NOT CHANGED
CLINICAL_PROGRESSION: NOT CHANGED

## 2020-06-20 ASSESSMENT — PAIN SCALES - GENERAL
PAINLEVEL_OUTOF10: 5
PAINLEVEL_OUTOF10: 9
PAINLEVEL_OUTOF10: 0
PAINLEVEL_OUTOF10: 9

## 2020-06-20 ASSESSMENT — PAIN - FUNCTIONAL ASSESSMENT: PAIN_FUNCTIONAL_ASSESSMENT: PREVENTS OR INTERFERES SOME ACTIVE ACTIVITIES AND ADLS

## 2020-06-20 ASSESSMENT — PAIN DESCRIPTION - ORIENTATION
ORIENTATION: RIGHT
ORIENTATION: RIGHT

## 2020-06-20 ASSESSMENT — PAIN DESCRIPTION - FREQUENCY
FREQUENCY: CONTINUOUS
FREQUENCY: CONTINUOUS

## 2020-06-20 NOTE — PROGRESS NOTES
ulcerations or thrush. Neck: Supple to movements. Chest: No use of accessory muscles to breathe. Symmetrical expansion. No wheezing, crackles or rhonchi. Cardiovascular: S1 and S2 are rhythmic and regular. No murmurs appreciated. Abdomen: Positive bowel sounds to auscultation. Benign to palpation. No masses felt. No hepatosplenomegaly.   Extremities: Right knee amputation dehisced stump-see pic  Lines: peripheral      NOW HAS BEEN I&D ABD AND PACKED    Laboratory and Tests Review:  Lab Results   Component Value Date    WBC 8.6 06/18/2020    WBC 12.8 (H) 06/14/2020    WBC 11.3 06/05/2020    HGB 8.2 (L) 06/18/2020    HCT 26.3 (L) 06/18/2020    MCV 84.3 06/18/2020     06/18/2020     Lab Results   Component Value Date    NEUTROABS 5.33 06/18/2020    NEUTROABS 9.02 (H) 06/14/2020    NEUTROABS 8.04 (H) 06/05/2020     No results found for: [facility]  Lab Results   Component Value Date    ALT 16 06/18/2020    AST 8 06/18/2020    ALKPHOS 82 06/18/2020    BILITOT 0.2 06/18/2020     Lab Results   Component Value Date     06/18/2020    K 4.2 06/18/2020    K 5.0 06/05/2020     06/18/2020    CO2 27 06/18/2020    BUN 19 06/18/2020    CREATININE 1.9 06/18/2020    CREATININE 2.0 06/14/2020    CREATININE 2.4 06/05/2020    GFRAA 44 06/18/2020    LABGLOM 44 06/18/2020    GLUCOSE 130 06/18/2020    PROT 7.3 06/18/2020    LABALBU 2.9 06/18/2020    CALCIUM 8.8 06/18/2020    BILITOT 0.2 06/18/2020    ALKPHOS 82 06/18/2020    AST 8 06/18/2020    ALT 16 06/18/2020     Lab Results   Component Value Date    CRP 10.6 (H) 10/24/2018    CRP 2.1 (H) 06/25/2018    CRP 7.4 (H) 06/11/2018     Lab Results   Component Value Date    SEDRATE 128 (H) 10/24/2018    SEDRATE 77 (H) 06/25/2018    SEDRATE 138 (H) 06/11/2018       Radiology:  Noted    Microbiology:   High wound culture 6/14/2020- MRSA    Assessment:  · Surgical wound infection  · Dehiscence of the above-the-knee amputation at the high thigh region with MRSA     Plan: · Cont Zyvox p.o. day 5  · Local wound care  · Stop isolation  · Monitor labs  · Will follow with you     SHONDA Copeland - CNP   6/20/2020     Pt seen and examined. Above discussed agree with advanced practice nurse. Labs, cultures, and radiographs reviewed. Face to Face encounter occurred. Changes made as necessary.      Clary Gutierrez MD

## 2020-06-20 NOTE — PROGRESS NOTES
(NORVASC) tablet 5 mg  5 mg Oral Daily Eddie Gomez MD   5 mg at 06/19/20 0848    atorvastatin (LIPITOR) tablet 10 mg  10 mg Oral Daily Fernando Ramos MD   10 mg at 06/19/20 0847    cilostazol (PLETAL) tablet 50 mg  50 mg Oral BID Fernando Ramos MD   50 mg at 06/19/20 2020    cloNIDine (CATAPRES) tablet 0.1 mg  0.1 mg Oral BID Fernando Ramos MD   0.1 mg at 06/19/20 2020    DULoxetine (CYMBALTA) extended release capsule 120 mg  120 mg Oral Daily Eddie Gomez MD   120 mg at 06/19/20 0847    enoxaparin (LOVENOX) injection 40 mg  40 mg Subcutaneous BID Fernando Ramos MD   40 mg at 06/19/20 2021    insulin lispro (HUMALOG) injection vial 0-12 Units  0-12 Units Subcutaneous TID WC Fernando Ramos MD   2 Units at 06/19/20 1124    insulin lispro (HUMALOG) injection vial 0-6 Units  0-6 Units Subcutaneous Nightly Fernando Ramos MD   2 Units at 06/17/20 2115    metoprolol tartrate (LOPRESSOR) tablet 25 mg  25 mg Oral BID Fernando Ramos MD   25 mg at 06/19/20 2021    miconazole (MICOTIN) 2 % powder   Topical BID Fernando Ramos MD        heparin flush 100 UNIT/ML injection 300 Units  300 Units Intracatheter BID Fernando Ramos MD   300 Units at 06/19/20 2242    sodium chloride flush 0.9 % injection 10 mL  10 mL Intravenous 2 times per day Fernando Ramos MD   10 mL at 06/19/20 2241     No Known Allergies  Active Problems:    Above knee amputation of right lower extremity ([address]  Resolved Problems:    * No resolved hospital problems. *    Blood pressure (!) 175/95, pulse 64, temperature 98.7 °F (37.1 °C), temperature source Oral, resp. rate 16, height 6' 2\" (1.88 m), weight 244 lb 3.2 oz (110.8 kg), SpO2 99 %. Subjective:  Symptoms:  Stable. He reports weakness. Diet:  Adequate intake. Activity level: Impaired due to weakness. Pain:  He complains of pain that is moderate. Objective:  General Appearance: In no acute distress.     Vital signs: (most recent): Blood pressure (!)

## 2020-06-20 NOTE — PROGRESS NOTES
Re-Education:      Cognitive Skills:   Status Comments   Problem   Solving fair + demo'd during arm ex's and w/c mobility     Memory fair + demo'd during arm ex's and w/c mobility    Sequencing fair + \"   Safety fair + \"      Visual Perception:    Education:  Pt educated with safety navigating around obstacles in hallway and gym during w/c mobility training. .     [x] Family teach completed on: 6/17/20 pt and girlfriend present for therapy discussed assist levels for pt, girlfriend demo'd fair understanding verbally, observed pt complete tub transfers discussion on safety, maintaining standing balance, proximity to pt during transfers for safety;     Pain Level: 0/10    Additional Notes:   6/6/20 Pt tearful this am since did not have any clothes and emotional in general with this therapist offering him support. Patient has made fair+ progress during treatment sessions toward set goals. Therapy emphasis to obtain goals: Strengthening, Wheelchair Mobility Training, Positioning, Gait Training, Safety Education & Training, Balance Training, Patient/Caregiver Education & Training, Self-Care / ADL, Functional Mobility Training, Equipment Evaluation, Education, & procurement, Home Management Training, Endurance Training      [x] Continue with current OT Plan of care.   [] Prepare for Discharge     DISCHARGE RECOMMENDATIONS  Recommended DME:    Post Discharge Care:   []Home Independently  [x]Home with assist PRN [x]Home with nursing aide  [x]Home with Home Health OT []Home with Out Pt OT []Other: ___   Comments:         Time in Time out Tx Time Breakdown  Variance:   First Session  11:45am 12:23pm [] Individual Tx-   [x] Concurrent Tx -38  [] Co-Tx -   [] Group Tx -   [] Time Missed -mins    Second Session   [] Individual Tx-   [] Concurrent Tx -   [] Co-Tx - 45 Mins  [] Group Tx -   [] Time Missed - mins     Third Session    [] Individual Tx-   [] Concurrent Tx -  [] Co-Tx -   [] Group Tx -   [] Time Missed -

## 2020-06-20 NOTE — PLAN OF CARE
Problem: Falls - Risk of:  Goal: Will remain free from falls  Description: Will remain free from falls  Outcome: Met This Shift  Goal: Absence of physical injury  Description: Absence of physical injury  Outcome: Met This Shift     Problem: Venous Thromboembolism:  Goal: Will show no signs or symptoms of venous thromboembolism  Description: Will show no signs or symptoms of venous thromboembolism  Outcome: Met This Shift  Goal: Absence of signs or symptoms of impaired coagulation  Description: Absence of signs or symptoms of impaired coagulation  Outcome: Met This Shift     Problem: SAFETY  Goal: LTG - patient will adhere to hip precautions during ADL's and transfers  Outcome: Met This Shift  Goal: LTG - Patient will demonstrate safety requirements appropriate to situation/environment  Outcome: Met This Shift  Goal: LTG - patient will utilize safety techniques  Outcome: Met This Shift  Goal: STG - patient locks brakes on wheelchair  Outcome: Met This Shift  Goal: STG - Patient uses call light consistently to request assistance with transfers  Outcome: Met This Shift  Goal: STG - patient uses gait belt during all transfers  Outcome: Met This Shift     Problem: Mobility - Impaired:  Goal: Mobility will improve  Description: Mobility will improve  Outcome: Met This Shift     Problem: Skin Integrity:  Goal: Will show no infection signs and symptoms  Description: Will show no infection signs and symptoms  Outcome: Met This Shift  Goal: Absence of new skin breakdown  Description: Absence of new skin breakdown  Outcome: Met This Shift     Problem: Pain:  Goal: Pain level will decrease  Description: Pain level will decrease  Outcome: Met This Shift  Goal: Control of acute pain  Description: Control of acute pain  Outcome: Met This Shift  Goal: Control of chronic pain  Description: Control of chronic pain  Outcome: Met This Shift     Problem: Musculor/Skeletal Functional Status  Goal: Absence of falls  Outcome: Met This Shift     Problem: Musculor/Skeletal Functional Status  Goal: Highest potential functional level  Outcome: Not Met This Shift

## 2020-06-20 NOTE — PROGRESS NOTES
06/20/20 1259   Attendance   Activity Games;1:1  Alejandra Aguilera)   Participation Active participation   [place] Demonstrates ability to complete social goals   Social Skills Interacts independently in social activity   Leisure Education Demonstrates knowledge of benefits of leisure involvement  Armin Caruso identified conversation and comradare as benefits.)   Time Spent With Patient   Minutes 80

## 2020-06-20 NOTE — PROGRESS NOTES
Physical Therapy    Facility/Department: [address] [facility]  Treatment Note   NAME: Taran Hunt  : 1955  MRN: [id number]    Date of Service: 2020     Evaluating Therapist: Celeste Badillo PT, DPT    ROOM: 1714-X  DIAGNOSIS: R AKA  PRECAUTIONS: falls, R AKA, contact isolation a  due to MRSA of R LE wound  HPI: Pt has hx AKA in 2018. [facility], underwent R Leg I&D 2020,   R Thigh Wound Dressing Change, Debridement, Removal of Muscle 2020,   Excisional debridement of the infected necrotic wound right groin and right thigh 2020. [facility] Social:  Pt lives with alone in a 1 floor plan 1 steps without rails to enter. Pt reports having HH aide present x 3 hrs daily to assist with homemaking/ADLs  Prior to admission: Pt ambulated short distances with [employer] and R prosthetic, reports having WC and performing majority of ADLs/activities in home at [facility] level     Initial Evaluation  20     PM  Short Term Goals Long Term Goals    Was pt agreeable to Eval/treatment? Yes  Yes      Does pt have pain?  Pt c/o significant pain at R residual limb   R residual limb pain (3/10)     Bed Mobility  Rolling: Mila  Supine to sit: Mila  Sit to supine: Mila  Scooting: Mila  NT SBA Modified Independent   Transfers Sit to stand: ModA from [facility] EOM  Stand to sit: ModA  Stand pivot: ModA with Bariatric [employer]    Sliding board transfer Mila from [employer] table  Sit<>stand: Supervision    Stand pivot: Supervision with bariatric [employer] SBA Modified Independent   Ambulation    TBA  25 feet with bariatric WW with Supervision 10 feet with bariatric WW with SBA 50 feet with Bariatric WW with Supervision   Wheel Chair Mobility 150 feet with BUE with SBA  NT     Car Transfers TBA  NT Mila SBA   Stair negotiation: ascended and descended TBA  NT 1 step with 2 rails with Mila 2 steps with 2 rail with SBA   Curb Step:   ascended and descended TBA  NT 2 inch

## 2020-06-21 LAB
METER GLUCOSE: 119 MG/DL (ref 74–99)
METER GLUCOSE: 142 MG/DL (ref 74–99)
METER GLUCOSE: 148 MG/DL (ref 74–99)
METER GLUCOSE: 167 MG/DL (ref 74–99)

## 2020-06-21 PROCEDURE — 1280000000 HC REHAB R&B

## 2020-06-21 PROCEDURE — 6360000002 HC RX W HCPCS: Performed by: PHYSICAL MEDICINE & REHABILITATION

## 2020-06-21 PROCEDURE — 97110 THERAPEUTIC EXERCISES: CPT

## 2020-06-21 PROCEDURE — 6370000000 HC RX 637 (ALT 250 FOR IP): Performed by: PHYSICAL MEDICINE & REHABILITATION

## 2020-06-21 PROCEDURE — 97535 SELF CARE MNGMENT TRAINING: CPT

## 2020-06-21 PROCEDURE — 97530 THERAPEUTIC ACTIVITIES: CPT

## 2020-06-21 PROCEDURE — 82962 GLUCOSE BLOOD TEST: CPT

## 2020-06-21 PROCEDURE — 2580000003 HC RX 258: Performed by: PHYSICAL MEDICINE & REHABILITATION

## 2020-06-21 PROCEDURE — 6370000000 HC RX 637 (ALT 250 FOR IP): Performed by: SPECIALIST

## 2020-06-21 RX ADMIN — ENOXAPARIN SODIUM 40 MG: 40 INJECTION SUBCUTANEOUS at 20:41

## 2020-06-21 RX ADMIN — Medication 10 ML: at 20:40

## 2020-06-21 RX ADMIN — SODIUM CHLORIDE, PRESERVATIVE FREE 300 UNITS: 5 INJECTION INTRAVENOUS at 20:41

## 2020-06-21 RX ADMIN — TIZANIDINE 2 MG: 4 TABLET ORAL at 08:34

## 2020-06-21 RX ADMIN — AMLODIPINE BESYLATE 5 MG: 5 TABLET ORAL at 08:35

## 2020-06-21 RX ADMIN — ATORVASTATIN CALCIUM 10 MG: 10 TABLET, FILM COATED ORAL at 08:31

## 2020-06-21 RX ADMIN — LINEZOLID 600 MG: 600 TABLET, FILM COATED ORAL at 08:31

## 2020-06-21 RX ADMIN — TIZANIDINE 2 MG: 4 TABLET ORAL at 20:42

## 2020-06-21 RX ADMIN — GABAPENTIN 400 MG: 400 CAPSULE ORAL at 13:46

## 2020-06-21 RX ADMIN — OXYCODONE HYDROCHLORIDE 10 MG: 10 TABLET, FILM COATED, EXTENDED RELEASE ORAL at 08:33

## 2020-06-21 RX ADMIN — TIZANIDINE 2 MG: 4 TABLET ORAL at 13:46

## 2020-06-21 RX ADMIN — GABAPENTIN 400 MG: 400 CAPSULE ORAL at 20:41

## 2020-06-21 RX ADMIN — Medication 10 ML: at 08:37

## 2020-06-21 RX ADMIN — GABAPENTIN 400 MG: 400 CAPSULE ORAL at 08:36

## 2020-06-21 RX ADMIN — CILOSTAZOL 50 MG: 50 TABLET ORAL at 08:35

## 2020-06-21 RX ADMIN — SODIUM CHLORIDE, PRESERVATIVE FREE 300 UNITS: 5 INJECTION INTRAVENOUS at 08:38

## 2020-06-21 RX ADMIN — METOPROLOL TARTRATE 25 MG: 25 TABLET, FILM COATED ORAL at 20:41

## 2020-06-21 RX ADMIN — LINEZOLID 600 MG: 600 TABLET, FILM COATED ORAL at 20:42

## 2020-06-21 RX ADMIN — INSULIN LISPRO 2 UNITS: 100 INJECTION, SOLUTION INTRAVENOUS; SUBCUTANEOUS at 12:06

## 2020-06-21 RX ADMIN — OXYCODONE HYDROCHLORIDE 10 MG: 10 TABLET, FILM COATED, EXTENDED RELEASE ORAL at 20:41

## 2020-06-21 RX ADMIN — CLONIDINE HYDROCHLORIDE 0.1 MG: 0.1 TABLET ORAL at 20:40

## 2020-06-21 RX ADMIN — DULOXETINE HYDROCHLORIDE 120 MG: 60 CAPSULE, DELAYED RELEASE ORAL at 08:35

## 2020-06-21 RX ADMIN — METOPROLOL TARTRATE 25 MG: 25 TABLET, FILM COATED ORAL at 08:34

## 2020-06-21 RX ADMIN — CILOSTAZOL 50 MG: 50 TABLET ORAL at 20:42

## 2020-06-21 RX ADMIN — CLONIDINE HYDROCHLORIDE 0.1 MG: 0.1 TABLET ORAL at 08:35

## 2020-06-21 RX ADMIN — ENOXAPARIN SODIUM 40 MG: 40 INJECTION SUBCUTANEOUS at 08:37

## 2020-06-21 ASSESSMENT — PAIN SCALES - GENERAL
PAINLEVEL_OUTOF10: 8
PAINLEVEL_OUTOF10: 0
PAINLEVEL_OUTOF10: 8

## 2020-06-21 NOTE — PROGRESS NOTES
OCCUPATIONAL THERAPY DAILY NOTE    Date:2020  Patient Name: Kael Luevano  MRN: [id number]  : 1955  Room: [address] [place] [address]     Referring Practitioner: Mila Holley MD  Diagnosis: [place] Bitter- RLE I&D 2020; removeal of muscle RLE 2020  Additional Pertinent Hx: DM, CKD, HTN, HLD, RAGHU & Obesity    Precautions:  Fall risk, R AKA     Functional Assessment:   Date Status AE  Comments   Feeding 20  Independent      Grooming 20 Supervision  Seated  Pt washed hands/face and applied deodorant seated on EOB . Bathing 20 UE bathing:Set up   LE bathing:  CGA/min A   Set up for UB sponge bath seated on EOB. Pt completed reshma care while supine in bed however difficulty reaching below knee/foot with LLE. UB Dressing 20 Independent    To oleg t- shirt seated on EOB   LB Dressing 20 CGA        To oleg hospital pants then stood using bariatric ww to pull garment up over hips. Pt needed  Assist to oleg left shoe seated on EOB and tie lace. Homemaking 20 SBA **has aides to complete homemaking       Functional Transfers / Balance:   Date Status DME  Comments   Sit Balance 20  supervision   Sitting balance during w/c propulsion and [email or web address] game to increase trunk control/core strength for ADL;s, transfers and mobility. Stand Balance 20 S/SBA Bariatric w. Walker  Standing balance using bariatric ww for clothing  Mgmt and EOB to w/c transfers. [x] Tub  [] Shower   Transfer 20 CGA   Extended tub bench, w/w     Commode   Transfer 20 CGA Drop arm bariatric BSC, ww    Functional   Mobility 20 S/SBA      Mod I  Bariatric w. Walker       W/c propulsion Pt completed SPT from EOB into w/c using bariatric ww and hopping method with LLE. W/c propelling in room & up/down hallway using BUE's to increase endurance/strength.     Other: supine <> EOB       Sit <> stand  20 Sup        S/SBA   Bed rail       Bariatric ww  Pt able to

## 2020-06-21 NOTE — PROGRESS NOTES
[address] [place] [address] [facility]  Progress Note    SUBJECTIVE:  No chief complaint on file. Patient is seen and examined at bedside. He is sleeping, awakens easily  Denies any fevers or chills. Denies any shortness of breath or chest pain. Patient is tolerating medications. No reported adverse drug reactions. No nausea, vomiting, diarrhea. Afebrile    Review of systems:  As stated above in the chief complaint, otherwise negative. Medications:  Scheduled Meds:   linezolid  600 mg Oral 2 times per day    oxyCODONE  10 mg Oral 2 times per day    tiZANidine  2 mg Oral TID    lidocaine  1 patch Transdermal Daily    gabapentin  400 mg Oral TID    mineral oil-hydrophilic petrolatum   Topical BID    amLODIPine  5 mg Oral Daily    atorvastatin  10 mg Oral Daily    cilostazol  50 mg Oral BID    cloNIDine  0.1 mg Oral BID    DULoxetine  120 mg Oral Daily    enoxaparin  40 mg Subcutaneous BID    insulin lispro  0-12 Units Subcutaneous TID WC    insulin lispro  0-6 Units Subcutaneous Nightly    metoprolol tartrate  25 mg Oral BID    miconazole   Topical BID    heparin flush  300 Units Intracatheter BID    sodium chloride flush  10 mL Intravenous 2 times per day     Continuous Infusions:   dextrose       PRN Meds:ondansetron, bisacodyl, ammonium lactate, oxyCODONE **OR** oxyCODONE, glucose, dextrose, glucagon (rDNA), dextrose, acetaminophen, polyethylene glycol, acetaminophen    OBJECTIVE:  BP (!) 188/84   Pulse 61   Temp 98.2 °F (36.8 °C) (Temporal)   Resp 18   Ht 6' 2\" (1.88 m)   Wt 240 lb (108.9 kg)   SpO2 99%   BMI 30.81 kg/m²   Temp  Av.2 °F (36.8 °C)  Min: 98.2 °F (36.8 °C)  Max: 98.2 °F (36.8 °C)  Constitutional: The patient is awake, alert, and oriented. Skin: Warm and dry. No rashes were noted. HEENT: Round and reactive pupils. Moist mucous membranes. No ulcerations or thrush. Neck: Supple to movements. Chest: No use of accessory muscles to breathe. Symmetrical expansion. No wheezing, crackles or rhonchi. Cardiovascular: S1 and S2 are rhythmic and regular. No murmurs appreciated. Abdomen: Positive bowel sounds to auscultation. Benign to palpation. No masses felt. No hepatosplenomegaly.   Extremities: Right knee amputation dressed  Lines: peripheral      NOW HAS BEEN I&D ABD AND PACKED    Laboratory and Tests Review:  Lab Results   Component Value Date    WBC 8.6 06/18/2020    WBC 12.8 (H) 06/14/2020    WBC 11.3 06/05/2020    HGB 8.2 (L) 06/18/2020    HCT 26.3 (L) 06/18/2020    MCV 84.3 06/18/2020     06/18/2020     Lab Results   Component Value Date    NEUTROABS 5.33 06/18/2020    NEUTROABS 9.02 (H) 06/14/2020    NEUTROABS 8.04 (H) 06/05/2020     No results found for: [facility]  Lab Results   Component Value Date    ALT 16 06/18/2020    AST 8 06/18/2020    ALKPHOS 82 06/18/2020    BILITOT 0.2 06/18/2020     Lab Results   Component Value Date     06/18/2020    K 4.2 06/18/2020    K 5.0 06/05/2020     06/18/2020    CO2 27 06/18/2020    BUN 19 06/18/2020    CREATININE 1.9 06/18/2020    CREATININE 2.0 06/14/2020    CREATININE 2.4 06/05/2020    GFRAA 44 06/18/2020    LABGLOM 44 06/18/2020    GLUCOSE 130 06/18/2020    PROT 7.3 06/18/2020    LABALBU 2.9 06/18/2020    CALCIUM 8.8 06/18/2020    BILITOT 0.2 06/18/2020    ALKPHOS 82 06/18/2020    AST 8 06/18/2020    ALT 16 06/18/2020     Lab Results   Component Value Date    CRP 10.6 (H) 10/24/2018    CRP 2.1 (H) 06/25/2018    CRP 7.4 (H) 06/11/2018     Lab Results   Component Value Date    SEDRATE 128 (H) 10/24/2018    SEDRATE 77 (H) 06/25/2018    SEDRATE 138 (H) 06/11/2018       Radiology:  Noted    Microbiology:   High wound culture 6/14/2020- MRSA    Assessment:  · Surgical wound infection  · Dehiscence of the above-the-knee amputation at the high thigh region with MRSA     Plan:    · Cont Zyvox p.o. day 6  · Local wound care  · Stop isolation  · Monitor labs  · Will follow with you     Srinivas Webb

## 2020-06-21 NOTE — PLAN OF CARE
Problem: Falls - Risk of:  Goal: Will remain free from falls  Description: Will remain free from falls  Outcome: Met This Shift  Goal: Absence of physical injury  Description: Absence of physical injury  Outcome: Met This Shift     Problem: Venous Thromboembolism:  Goal: Will show no signs or symptoms of venous thromboembolism  Description: Will show no signs or symptoms of venous thromboembolism  Outcome: Met This Shift  Goal: Absence of signs or symptoms of impaired coagulation  Description: Absence of signs or symptoms of impaired coagulation  Outcome: Met This Shift     Problem: SAFETY  Goal: LTG - patient will adhere to hip precautions during ADL's and transfers  Outcome: Met This Shift  Goal: LTG - Patient will demonstrate safety requirements appropriate to situation/environment  Outcome: Met This Shift  Goal: LTG - patient will utilize safety techniques  Outcome: Met This Shift  Goal: STG - patient locks brakes on wheelchair  Outcome: Met This Shift  Goal: STG - Patient uses call light consistently to request assistance with transfers  Outcome: Met This Shift  Goal: STG - patient uses gait belt during all transfers  Outcome: Met This Shift     Problem: Mobility - Impaired:  Goal: Mobility will improve  Description: Mobility will improve  Outcome: Met This Shift     Problem: Skin Integrity:  Goal: Will show no infection signs and symptoms  Description: Will show no infection signs and symptoms  Outcome: Met This Shift  Goal: Absence of new skin breakdown  Description: Absence of new skin breakdown  Outcome: Met This Shift     Problem: Musculor/Skeletal Functional Status  Goal: Highest potential functional level  Outcome: Met This Shift  Goal: Absence of falls  Outcome: Met This Shift     Problem: Pain:  Goal: Pain level will decrease  Description: Pain level will decrease  Outcome: Not Met This Shift  Goal: Control of acute pain  Description: Control of acute pain  Outcome: Not Met This Shift  Goal: Control of chronic pain  Description: Control of chronic pain  Outcome: Not Met This Shift

## 2020-06-22 LAB
METER GLUCOSE: 120 MG/DL (ref 74–99)
METER GLUCOSE: 141 MG/DL (ref 74–99)
METER GLUCOSE: 166 MG/DL (ref 74–99)
METER GLUCOSE: 176 MG/DL (ref 74–99)

## 2020-06-22 PROCEDURE — 2580000003 HC RX 258: Performed by: PHYSICAL MEDICINE & REHABILITATION

## 2020-06-22 PROCEDURE — 97530 THERAPEUTIC ACTIVITIES: CPT

## 2020-06-22 PROCEDURE — 6370000000 HC RX 637 (ALT 250 FOR IP): Performed by: PHYSICAL MEDICINE & REHABILITATION

## 2020-06-22 PROCEDURE — 1280000000 HC REHAB R&B

## 2020-06-22 PROCEDURE — 6360000002 HC RX W HCPCS: Performed by: PHYSICAL MEDICINE & REHABILITATION

## 2020-06-22 PROCEDURE — 82962 GLUCOSE BLOOD TEST: CPT

## 2020-06-22 PROCEDURE — 97535 SELF CARE MNGMENT TRAINING: CPT

## 2020-06-22 PROCEDURE — 97110 THERAPEUTIC EXERCISES: CPT

## 2020-06-22 PROCEDURE — 6370000000 HC RX 637 (ALT 250 FOR IP): Performed by: SPECIALIST

## 2020-06-22 PROCEDURE — 94660 CPAP INITIATION&MGMT: CPT

## 2020-06-22 RX ORDER — CLONIDINE HYDROCHLORIDE 0.1 MG/1
0.1 TABLET ORAL 3 TIMES DAILY
Status: DISCONTINUED | OUTPATIENT
Start: 2020-06-22 | End: 2020-06-23 | Stop reason: HOSPADM

## 2020-06-22 RX ADMIN — ATORVASTATIN CALCIUM 10 MG: 10 TABLET, FILM COATED ORAL at 09:16

## 2020-06-22 RX ADMIN — TIZANIDINE 2 MG: 4 TABLET ORAL at 21:55

## 2020-06-22 RX ADMIN — AMLODIPINE BESYLATE 5 MG: 5 TABLET ORAL at 09:16

## 2020-06-22 RX ADMIN — DULOXETINE HYDROCHLORIDE 120 MG: 60 CAPSULE, DELAYED RELEASE ORAL at 09:16

## 2020-06-22 RX ADMIN — METOPROLOL TARTRATE 25 MG: 25 TABLET, FILM COATED ORAL at 21:55

## 2020-06-22 RX ADMIN — SODIUM CHLORIDE, PRESERVATIVE FREE 300 UNITS: 5 INJECTION INTRAVENOUS at 09:21

## 2020-06-22 RX ADMIN — OXYCODONE HYDROCHLORIDE 10 MG: 10 TABLET, FILM COATED, EXTENDED RELEASE ORAL at 21:55

## 2020-06-22 RX ADMIN — GABAPENTIN 400 MG: 400 CAPSULE ORAL at 21:55

## 2020-06-22 RX ADMIN — CILOSTAZOL 50 MG: 50 TABLET ORAL at 09:16

## 2020-06-22 RX ADMIN — CLONIDINE HYDROCHLORIDE 0.1 MG: 0.1 TABLET ORAL at 09:15

## 2020-06-22 RX ADMIN — CLONIDINE HYDROCHLORIDE 0.1 MG: 0.1 TABLET ORAL at 21:55

## 2020-06-22 RX ADMIN — OXYCODONE HYDROCHLORIDE 10 MG: 10 TABLET, FILM COATED, EXTENDED RELEASE ORAL at 09:15

## 2020-06-22 RX ADMIN — INSULIN LISPRO 2 UNITS: 100 INJECTION, SOLUTION INTRAVENOUS; SUBCUTANEOUS at 12:22

## 2020-06-22 RX ADMIN — SODIUM CHLORIDE, PRESERVATIVE FREE 300 UNITS: 5 INJECTION INTRAVENOUS at 21:01

## 2020-06-22 RX ADMIN — CILOSTAZOL 50 MG: 50 TABLET ORAL at 21:55

## 2020-06-22 RX ADMIN — TIZANIDINE 2 MG: 4 TABLET ORAL at 09:20

## 2020-06-22 RX ADMIN — METOPROLOL TARTRATE 25 MG: 25 TABLET, FILM COATED ORAL at 09:16

## 2020-06-22 RX ADMIN — ENOXAPARIN SODIUM 40 MG: 40 INJECTION SUBCUTANEOUS at 21:55

## 2020-06-22 RX ADMIN — Medication 10 ML: at 09:20

## 2020-06-22 RX ADMIN — OXYCODONE HYDROCHLORIDE 10 MG: 10 TABLET ORAL at 13:40

## 2020-06-22 RX ADMIN — ENOXAPARIN SODIUM 40 MG: 40 INJECTION SUBCUTANEOUS at 09:15

## 2020-06-22 RX ADMIN — GABAPENTIN 400 MG: 400 CAPSULE ORAL at 09:16

## 2020-06-22 RX ADMIN — LINEZOLID 600 MG: 600 TABLET, FILM COATED ORAL at 09:17

## 2020-06-22 RX ADMIN — PETROLATUM: 42 OINTMENT TOPICAL at 22:02

## 2020-06-22 RX ADMIN — Medication 10 ML: at 21:01

## 2020-06-22 RX ADMIN — MICONAZOLE NITRATE: 20.6 POWDER TOPICAL at 22:03

## 2020-06-22 ASSESSMENT — PAIN DESCRIPTION - FREQUENCY
FREQUENCY: CONTINUOUS
FREQUENCY: CONTINUOUS

## 2020-06-22 ASSESSMENT — PAIN DESCRIPTION - ONSET: ONSET: ON-GOING

## 2020-06-22 ASSESSMENT — PAIN SCALES - GENERAL
PAINLEVEL_OUTOF10: 6
PAINLEVEL_OUTOF10: 9
PAINLEVEL_OUTOF10: 0
PAINLEVEL_OUTOF10: 10
PAINLEVEL_OUTOF10: 5
PAINLEVEL_OUTOF10: 6

## 2020-06-22 ASSESSMENT — PAIN DESCRIPTION - LOCATION
LOCATION: HIP
LOCATION: HIP

## 2020-06-22 ASSESSMENT — PAIN DESCRIPTION - ORIENTATION
ORIENTATION: RIGHT
ORIENTATION: RIGHT

## 2020-06-22 ASSESSMENT — PAIN DESCRIPTION - PAIN TYPE
TYPE: ACUTE PAIN
TYPE: ACUTE PAIN

## 2020-06-22 ASSESSMENT — PAIN DESCRIPTION - DESCRIPTORS: DESCRIPTORS: ACHING

## 2020-06-22 NOTE — PROGRESS NOTES
 acetaminophen (TYLENOL) tablet 650 mg  650 mg Oral Q6H PRN Katrina Ortiz MD        amLODIPine (NORVASC) tablet 5 mg  5 mg Oral Daily Eddie Gomez MD   5 mg at 06/21/20 0835    atorvastatin (LIPITOR) tablet 10 mg  10 mg Oral Daily Katrina Ortiz MD   10 mg at 06/21/20 0831    cilostazol (PLETAL) tablet 50 mg  50 mg Oral BID Katrina Ortiz MD   50 mg at 06/21/20 2042    DULoxetine (CYMBALTA) extended release capsule 120 mg  120 mg Oral Daily Eddie Gomez MD   120 mg at 06/21/20 0835    enoxaparin (LOVENOX) injection 40 mg  40 mg Subcutaneous BID Katrina Ortiz MD   40 mg at 06/21/20 2041    insulin lispro (HUMALOG) injection vial 0-12 Units  0-12 Units Subcutaneous TID WC Katrina Ortiz MD   2 Units at 06/21/20 1206    insulin lispro (HUMALOG) injection vial 0-6 Units  0-6 Units Subcutaneous Nightly Katrina Ortiz MD   2 Units at 06/17/20 2115    metoprolol tartrate (LOPRESSOR) tablet 25 mg  25 mg Oral BID Katrina Ortiz MD   25 mg at 06/21/20 2041    miconazole (MICOTIN) 2 % powder   Topical BID Katrina Ortiz MD        heparin flush 100 UNIT/ML injection 300 Units  300 Units Intracatheter BID Katrina Ortiz MD   300 Units at 06/21/20 2041    sodium chloride flush 0.9 % injection 10 mL  10 mL Intravenous 2 times per day Katrina Ortiz MD   10 mL at 06/21/20 2040     No Known Allergies  Active Problems:    Above knee amputation of right lower extremity ([address]  Resolved Problems:    * No resolved hospital problems. *    Blood pressure (!) 179/86, pulse 64, temperature 98.7 °F (37.1 °C), temperature source Oral, resp. rate 15, height 6' 2\" (1.88 m), weight 240 lb (108.9 kg), SpO2 96 %. Subjective:  Symptoms:  Stable. He reports weakness. Diet:  Adequate intake. Activity level: Impaired due to weakness. Pain:  He complains of pain that is mild. Objective:  General Appearance: In no acute distress.     Vital signs: (most recent): Blood pressure (!) 179/86, pulse Strengthening, Wheelchair Mobility Training, Positioning, Gait Training, Safety Education & Training, Balance Training, Patient/Caregiver Education & Training, Self-Care / ADL, Functional Mobility Training, Equipment Evaluation, Education, & procurement, Home Management Training, Endurance Training       [x]? Continue with current OT Plan of care. []? Prepare for Discharge                DISCHARGE RECOMMENDATIONS    Recommended DME:     Post Discharge Care:   []? Home Independently  [x]? Home with assist PRN [x]? Home with nursing aide  [x]? Home with Home Health OT []? Home with Out Pt OT []? Other: ___   Comments:            Time in Time out Tx Time Breakdown  Variance:   First Session  9:55am 10:30am [x]? Individual Tx- 35mins  []? Concurrent Tx -  []? Co-Tx -   []? Group Tx -   []? Time Missed -mins     Second Session 10:30am 11:05am []? Individual Tx-   [x]? Concurrent Tx - 35min  []? Co-Tx -  Mins  []? Group Tx -   []? Time Missed - mins      Third Session      []? Individual Tx-   []? Concurrent Tx -  []? Co-Tx -   []? Group Tx -   []? Time Missed -              Total Tx Time:  70 mins        Ron Sanchez  BUCK/L [id number]                                        Assessment:    Condition: In stable condition. Improving. (Right above-knee amputation). Plan:   Encourage ambulation.  (Wound has improved  Decrease drainage  Out of isolation  Continues oral Zyvox  Pain is improving  Blood pressure is high  Increase Catapres to 3 times daily).        Katrina Ortiz MD  6/22/2020

## 2020-06-22 NOTE — PROGRESS NOTES
Symmetrical expansion. No wheezing, crackles or rhonchi. Cardiovascular: S1 and S2 are rhythmic and regular. No murmurs appreciated. Abdomen: Positive bowel sounds to auscultation. Benign to palpation. No masses felt. No hepatosplenomegaly.   Extremities: Right knee amputation dressed  Lines: peripheral      NOW HAS BEEN I&D ABD AND PACKED    Laboratory and Tests Review:  Lab Results   Component Value Date    WBC 8.6 06/18/2020    WBC 12.8 (H) 06/14/2020    WBC 11.3 06/05/2020    HGB 8.2 (L) 06/18/2020    HCT 26.3 (L) 06/18/2020    MCV 84.3 06/18/2020     06/18/2020     Lab Results   Component Value Date    NEUTROABS 5.33 06/18/2020    NEUTROABS 9.02 (H) 06/14/2020    NEUTROABS 8.04 (H) 06/05/2020     No results found for: [facility]  Lab Results   Component Value Date    ALT 16 06/18/2020    AST 8 06/18/2020    ALKPHOS 82 06/18/2020    BILITOT 0.2 06/18/2020     Lab Results   Component Value Date     06/18/2020    K 4.2 06/18/2020    K 5.0 06/05/2020     06/18/2020    CO2 27 06/18/2020    BUN 19 06/18/2020    CREATININE 1.9 06/18/2020    CREATININE 2.0 06/14/2020    CREATININE 2.4 06/05/2020    GFRAA 44 06/18/2020    LABGLOM 44 06/18/2020    GLUCOSE 130 06/18/2020    PROT 7.3 06/18/2020    LABALBU 2.9 06/18/2020    CALCIUM 8.8 06/18/2020    BILITOT 0.2 06/18/2020    ALKPHOS 82 06/18/2020    AST 8 06/18/2020    ALT 16 06/18/2020     Lab Results   Component Value Date    CRP 10.6 (H) 10/24/2018    CRP 2.1 (H) 06/25/2018    CRP 7.4 (H) 06/11/2018     Lab Results   Component Value Date    SEDRATE 128 (H) 10/24/2018    SEDRATE 77 (H) 06/25/2018    SEDRATE 138 (H) 06/11/2018       Radiology:  Noted    Microbiology:   High wound culture 6/14/2020- MRSA    Assessment:  · Surgical wound infection  · Dehiscence of the above-the-knee amputation at the high thigh region with MRSA     Plan:    · Cont Zyvox p.o. day 7-we will and Zyvox today  · Local wound care  · Okay to discharge follow-up in the office in 10

## 2020-06-22 NOTE — PROGRESS NOTES
Perception:    Education:  Pt educated safety during EOB to w/c transfers using bariatric w. Walker along with during ADL; 's while seated on EOB. .     [x] Family teach completed on: 6/17/20 pt and girlfriend present for therapy discussed assist levels for pt, girlfriend ashley'd fair understanding verbally, observed pt complete tub transfers discussion on safety, maintaining standing balance, proximity to pt during transfers for safety;     Pain Level: 0/10    Additional Notes:   6/6/20 Pt tearful this am since did not have any clothes and emotional in general with this therapist offering him support. 6/21/20 ADL done later in am vs 7:30 am on this date due to patient stating \"I did not sleep well last night\"      Patient has made fair+ progress during treatment sessions toward set goals. Therapy emphasis to obtain goals: Strengthening, Wheelchair Mobility Training, Positioning, Gait Training, Safety Education & Training, Balance Training, Patient/Caregiver Education & Training, Self-Care / ADL, Functional Mobility Training, Equipment Evaluation, Education, & procurement, Home Management Training, Endurance Training      [x] Continue with current OT Plan of care.   [] Prepare for Discharge     DISCHARGE RECOMMENDATIONS  Recommended DME:    Post Discharge Care:   []Home Independently  [x]Home with assist PRN [x]Home with nursing aide  [x]Home with Home Health OT []Home with Out Pt OT []Other: ___   Comments:         Time in Time out Tx Time Breakdown  Variance:   First Session  [id number] [x] Individual Tx- 45mins  [] Concurrent Tx -  [] Co-Tx -   [] Group Tx -   [] Time Missed -mins    Second Session 1430 1500 [x] Individual Tx- 30 min  [] Concurrent Tx -    [] Co-Tx -  Mins  [] Group Tx -   [x] Time Missed - 15 mins  Pt having c/o nausea requesting to return to room    Third Session    [] Individual Tx-   [] Concurrent Tx -  [] Co-Tx -   [] Group Tx -   [] Time Missed -         Total Tx Time: 75  mins       Dalton Cedeno
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
## 2020-06-22 NOTE — PROGRESS NOTES
flowsheets )  · Oral Nutrition Supplement (ONS) Orders: Low Calorie High Protein Supplement, Wound Healing Oral Supplement  · ONS intake: 51-75%, %     · Anthropometric Measures:  · Ht: 6' 2\" (188 cm)   · Current Body Wt: 240 lb (108.9 kg)(6/19/20, [facility])  · Admission Body Wt: 250 lb (113.4 kg)(6/4/20, [facility])  · Ideal Body Wt: 190 lb (86.2 kg), % Ideal Body 126%  · BMI Classification: BMI 30.0 - 34.9 Obese Class I    Nutrition Interventions:   Continue current diet, Continue current ONS  Continued Inpatient Monitoring, Coordination of Care    Nutrition Evaluation:   · Evaluation: Goals set   · Goals: Patient will consume >75% of most meals served     · Monitoring: Meal Intake, Supplement Intake, Diet Tolerance, Skin Integrity, Wound Healing, I&O, Monitor Hemodynamic Status, Monitor Bowel Function, Weight, Pertinent Labs, Chewing/Swallowing      Electronically signed by Theo Alejandre RD, LD on 6/22/20 at 10:43 AM EDT    Contact Number: [phone]

## 2020-06-22 NOTE — PROGRESS NOTES
Physical Therapy    Facility/Department: [address] REHAB  Weekly Progress Note    NAME: Dimitri Alvarez  : 1955  MRN: [id number]    Date of Service: 2020     Evaluating Therapist: Radha Berg PT, DPT    ROOM: [facility]  DIAGNOSIS: R AKA  PRECAUTIONS: falls, R AKA  HPI: Pt has hx AKA in 2018. Pt admitted to [facility] due to residual limb infection, underwent R Leg I&D 2020,   R Thigh Wound Dressing Change, Debridement, Removal of Muscle 2020,   Excisional debridement of the infected necrotic wound right groin and right thigh 2020. [facility] Social:  Pt lives with alone in a 1 floor plan 1 steps without rails to enter. Pt reports having HH aide present x 3 hrs daily to assist with homemaking/ADLs  Prior to admission: Pt ambulated short distances with [address] and R prosthetic, reports having WC and performing majority of ADLs/activities in home at [facility] level     Initial Evaluation  20 608/20 6/15/2020 6/22 Comments Short Term Goals Long Term Goals    Bed Mobility  Rolling: Makenzie  Supine to sit: Makenzie  Sit to supine: Makenzie  Scooting: Makenzie Rolling: cgA  Supine to sit: cgA  Sit to supine: Makenzie  Scooting: cgA  rolling supervision  using rail  Supine to sit sba  Sit to supine sba/s  Supine scooting min To his right supervision  To his left mod I   All aspects of bed mobility    talked to pt about switching bed so he can get out on his left. Do not think this is a major issue with bed rails.   SBA Modified Independent   Transfers Sit to stand: ModA from raised EOM  Stand to sit: ModA  Stand pivot: ModA with Bariatric [address]    Sliding board transfer Makenzie from [employer] table Sit to stand: cgA  Stand to sit: cgA  Stand pivot: cg/Makenzie with Bariatric WW     Sliding board: sba     Sit to stand sba  Stand to sit sba  Stand pivot cga/sba  with bariatric ww     Transfer board sba  Sit <> stand  Supervision /mod I  Stand pivot supervision /mod I     One arm pushing from mat, one arm

## 2020-06-22 NOTE — PLAN OF CARE
Problem: Falls - Risk of:  Goal: Will remain free from falls  Description: Will remain free from falls  Outcome: Met This Shift  Goal: Absence of physical injury  Description: Absence of physical injury  Outcome: Met This Shift     Problem: Venous Thromboembolism:  Goal: Will show no signs or symptoms of venous thromboembolism  Description: Will show no signs or symptoms of venous thromboembolism  Outcome: Met This Shift     Problem: SAFETY  Goal: LTG - patient will utilize safety techniques  Outcome: Met This Shift  Goal: STG - patient locks brakes on wheelchair  Outcome: Met This Shift  Goal: STG - Patient uses call light consistently to request assistance with transfers  Outcome: Met This Shift     Problem: Increased nutrient needs (NI-5.1)  Goal: Food and/or Nutrient Delivery  Description: Individualized approach for food/nutrient provision.   6/22/2020 1043 by Martha Scott RD, SHIRLEY  Outcome: Met This Shift

## 2020-06-23 VITALS
RESPIRATION RATE: 18 BRPM | SYSTOLIC BLOOD PRESSURE: 190 MMHG | HEIGHT: 74 IN | DIASTOLIC BLOOD PRESSURE: 91 MMHG | OXYGEN SATURATION: 96 % | BODY MASS INDEX: 30.8 KG/M2 | HEART RATE: 59 BPM | WEIGHT: 240 LBS | TEMPERATURE: 97.7 F

## 2020-06-23 LAB
METER GLUCOSE: 121 MG/DL (ref 74–99)
METER GLUCOSE: 152 MG/DL (ref 74–99)

## 2020-06-23 PROCEDURE — 97530 THERAPEUTIC ACTIVITIES: CPT

## 2020-06-23 PROCEDURE — 82962 GLUCOSE BLOOD TEST: CPT

## 2020-06-23 PROCEDURE — 6360000002 HC RX W HCPCS: Performed by: PHYSICAL MEDICINE & REHABILITATION

## 2020-06-23 PROCEDURE — 97110 THERAPEUTIC EXERCISES: CPT

## 2020-06-23 PROCEDURE — 97535 SELF CARE MNGMENT TRAINING: CPT

## 2020-06-23 PROCEDURE — 6370000000 HC RX 637 (ALT 250 FOR IP): Performed by: PHYSICAL MEDICINE & REHABILITATION

## 2020-06-23 RX ORDER — GABAPENTIN 400 MG/1
400 CAPSULE ORAL 3 TIMES DAILY
Qty: 90 CAPSULE | Refills: 3 | Status: SHIPPED | OUTPATIENT
Start: 2020-06-23 | End: 2020-11-10 | Stop reason: SDUPTHER

## 2020-06-23 RX ORDER — POLYETHYLENE GLYCOL 3350 17 G/17G
17 POWDER, FOR SOLUTION ORAL DAILY PRN
Qty: 527 G | Refills: 1 | Status: SHIPPED | OUTPATIENT
Start: 2020-06-23 | End: 2020-07-23

## 2020-06-23 RX ORDER — TIZANIDINE 2 MG/1
2 TABLET ORAL 3 TIMES DAILY
Qty: 90 TABLET | Refills: 0 | Status: SHIPPED | OUTPATIENT
Start: 2020-06-23 | End: 2020-10-13

## 2020-06-23 RX ORDER — NICOTINE POLACRILEX 4 MG
15 LOZENGE BUCCAL PRN
Qty: 45 G | Refills: 1 | Status: SHIPPED | OUTPATIENT
Start: 2020-06-23 | End: 2021-05-12

## 2020-06-23 RX ORDER — OXYCODONE HYDROCHLORIDE 5 MG/1
5 TABLET ORAL EVERY 6 HOURS PRN
Qty: 120 TABLET | Refills: 0 | Status: SHIPPED | OUTPATIENT
Start: 2020-06-23 | End: 2020-07-02 | Stop reason: SDUPTHER

## 2020-06-23 RX ORDER — LINEZOLID 600 MG/1
600 TABLET, FILM COATED ORAL EVERY 12 HOURS SCHEDULED
Qty: 28 TABLET | Refills: 0 | Status: SHIPPED
Start: 2020-06-23 | End: 2020-06-23 | Stop reason: HOSPADM

## 2020-06-23 RX ORDER — AMMONIUM LACTATE 12 G/100G
LOTION TOPICAL
Qty: 1 BOTTLE | Refills: 0 | Status: SHIPPED | OUTPATIENT
Start: 2020-06-23 | End: 2021-01-15 | Stop reason: SDUPTHER

## 2020-06-23 RX ORDER — OXYCODONE HCL 10 MG/1
10 TABLET, FILM COATED, EXTENDED RELEASE ORAL EVERY 12 HOURS SCHEDULED
Qty: 60 EACH | Refills: 0 | Status: SHIPPED | OUTPATIENT
Start: 2020-06-23 | End: 2020-07-02 | Stop reason: SDUPTHER

## 2020-06-23 RX ORDER — PETROLATUM 42 G/100G
OINTMENT TOPICAL
Qty: 1 TUBE | Refills: 0 | Status: SHIPPED
Start: 2020-06-23 | End: 2020-12-21 | Stop reason: SDUPTHER

## 2020-06-23 RX ORDER — CILOSTAZOL 50 MG/1
50 TABLET ORAL 2 TIMES DAILY
Qty: 60 TABLET | Refills: 3 | Status: SHIPPED | OUTPATIENT
Start: 2020-06-23 | End: 2020-10-13

## 2020-06-23 RX ADMIN — TIZANIDINE 2 MG: 4 TABLET ORAL at 12:57

## 2020-06-23 RX ADMIN — TIZANIDINE 2 MG: 4 TABLET ORAL at 08:23

## 2020-06-23 RX ADMIN — MICONAZOLE NITRATE: 20.6 POWDER TOPICAL at 06:23

## 2020-06-23 RX ADMIN — PETROLATUM: 42 OINTMENT TOPICAL at 06:22

## 2020-06-23 RX ADMIN — ATORVASTATIN CALCIUM 10 MG: 10 TABLET, FILM COATED ORAL at 08:23

## 2020-06-23 RX ADMIN — OXYCODONE HYDROCHLORIDE 10 MG: 10 TABLET, FILM COATED, EXTENDED RELEASE ORAL at 08:28

## 2020-06-23 RX ADMIN — CILOSTAZOL 50 MG: 50 TABLET ORAL at 08:23

## 2020-06-23 RX ADMIN — CLONIDINE HYDROCHLORIDE 0.1 MG: 0.1 TABLET ORAL at 12:56

## 2020-06-23 RX ADMIN — GABAPENTIN 400 MG: 400 CAPSULE ORAL at 08:22

## 2020-06-23 RX ADMIN — AMLODIPINE BESYLATE 5 MG: 5 TABLET ORAL at 08:23

## 2020-06-23 RX ADMIN — OXYCODONE HYDROCHLORIDE 10 MG: 10 TABLET ORAL at 12:56

## 2020-06-23 RX ADMIN — GABAPENTIN 400 MG: 400 CAPSULE ORAL at 12:56

## 2020-06-23 RX ADMIN — CLONIDINE HYDROCHLORIDE 0.1 MG: 0.1 TABLET ORAL at 08:21

## 2020-06-23 RX ADMIN — DULOXETINE HYDROCHLORIDE 120 MG: 60 CAPSULE, DELAYED RELEASE ORAL at 08:23

## 2020-06-23 RX ADMIN — METOPROLOL TARTRATE 25 MG: 25 TABLET, FILM COATED ORAL at 08:22

## 2020-06-23 RX ADMIN — ENOXAPARIN SODIUM 40 MG: 40 INJECTION SUBCUTANEOUS at 08:21

## 2020-06-23 RX ADMIN — INSULIN LISPRO 2 UNITS: 100 INJECTION, SOLUTION INTRAVENOUS; SUBCUTANEOUS at 10:54

## 2020-06-23 ASSESSMENT — PAIN DESCRIPTION - ONSET: ONSET: ON-GOING

## 2020-06-23 ASSESSMENT — PAIN DESCRIPTION - DESCRIPTORS: DESCRIPTORS: ACHING;DISCOMFORT

## 2020-06-23 ASSESSMENT — PAIN SCALES - GENERAL
PAINLEVEL_OUTOF10: 8
PAINLEVEL_OUTOF10: 8
PAINLEVEL_OUTOF10: 0
PAINLEVEL_OUTOF10: 0

## 2020-06-23 ASSESSMENT — PAIN DESCRIPTION - LOCATION: LOCATION: HIP

## 2020-06-23 ASSESSMENT — PAIN DESCRIPTION - ORIENTATION: ORIENTATION: RIGHT

## 2020-06-23 ASSESSMENT — PAIN DESCRIPTION - FREQUENCY: FREQUENCY: CONTINUOUS

## 2020-06-23 ASSESSMENT — PAIN - FUNCTIONAL ASSESSMENT: PAIN_FUNCTIONAL_ASSESSMENT: PREVENTS OR INTERFERES SOME ACTIVE ACTIVITIES AND ADLS

## 2020-06-23 ASSESSMENT — PAIN DESCRIPTION - PAIN TYPE: TYPE: ACUTE PAIN

## 2020-06-23 NOTE — PATIENT CARE CONFERENCE
[address] [place] [address]  ACUTE REHABILITATION  TEAM CONFERENCE NOTE/PATIENT PLAN OF CARE    Date: 2020  Admission date: 2020  Patient Name: Karen Luciano        MRN: [id number]    : 1955  (66 y.o.)  Gender: male   Rehab diagnosis/surgery with date: Right above knee amputation  20   Impairment Group Code:  5.3      MEDICAL/FUNCTIONAL HISTORY/STATUS:  Pt is out of isolation now. Planning to go home today.  Wound care completed daily to right leg wound    Consultations/Labs/X-rays:  today      MEDICATION UPDATE:  Stop antibiotics today  Catapres increased due to elevated BP      NURSING FIMS:    Bowel:   Current level: continent    Bladder:   Current level: continent    Toilet Hygiene:   Current level : Minimum assistance   Short term Toilet hygiene goal: Stand by Assist   Long term toilet hygiene goal:  Stand by Assist     Skin integrity: right thigh wound  Pain: right hip, controlled with oxycodone IR and extended release    NUTRITION    Diet  Carb controlled   Liquid consistency   thin    SOCIAL INFORMATION:  Lives with: alone  Prior community services:  [employer] services for aide 14 hours per week, meal delivery services  Home Architecture:  apartment, 1 entry step, no rails  Prior Level of function:  used a wheeled walker to get around at home, did his ADLs at wheelchair level with help from home health aide  DME: wheeled walker, wheelchair, tub transfer bench, prosthesis for right leg, crutches     FAMILY / PATIENT EDUCATION:  will discharge to home alone with help from waivers 28 hours a week, girlfriend is paid caregiver    PHYSICAL THERAPY    Bed mobility:   Current level: Modified Independent   Short term bed mobility goal: met  Long term bed mobility goal: met     Chair/bed transfers:  Current level: Modified Independent to Supervision , slide board at [address] term Chair/bed transfers goal: Modified Independent   Long term Chair/bed transfers goal: Modified term toilet transfer goal: Stand by Assist     Social interaction:  fair    Safety awareness: fair+    Comments: participates in rec therapy    Patient/family's personal goals: go home today  Factors supporting goal achievement:  Making progress  Factors hindering goal achievement:  Pain, incision          Discharge Plan   Estimated Length of Stay: 6/23            Destination: home  Services at Discharge: waiver program for home health aide for 28 hours a week, RN/PT/OT c    Equipment at Discharge: wheeled walker, Drop arm commode, slide board, roho cushion      INTERDISCIPLINARY TEAM/PHYSICIAN RECOMMENDATION AND/OR REVISIONS OF PLAN OF CARE:  Discharge home today after family teaching completed. I approve the established interdisciplinary plan of care as documented within the medical record of Brittanie Escobar. Electronically signed by Cayden Pennington RN on 6/23/2020 at 8:41 AM  The following interdisciplinary team members were present:  ZAKIYA Anderson, MSSA,LSW  Ron Toscano.  Georgiann Leyden, DPT  Catrina Dobbs, OTR  Melodie Gonzales, [address], CCC-SLP
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
## 2020-06-23 NOTE — PROGRESS NOTES
OCCUPATIONAL THERAPY DAILY NOTE/DISCHARGE SUMMARY     Date:2020  Patient Name: Karen Luciano  MRN: [id number]  : 1955  Room: [address] [place] [address]     Referring Practitioner: Cristina Cotto MD  Diagnosis: Delgado Shantel- RLE I&D 2020; removeal of muscle RLE 2020  Additional Pertinent Hx: DM, CKD, HTN, HLD, RAGHU & Obesity    Precautions:  Fall risk, R AKA     Functional Assessment:   Date Status AE  Comments   Feeding 20  Independent      Grooming 20 Supervision  Seated     Bathing 20 UE bathing:Set up   LE bathing:  CGA/min A      UB Dressing 20 Independent      LB Dressing 20 CGA       LH shoe horn, sock aide  Pt seated to oleg/doff sock, shoe with LH shoe horn and sock aide requiring assist to tie shoe with pt having c/o lightheadedness flexing at waist .    Homemaking 20 SBA **has aides to complete homemaking       Functional Transfers / Balance:   Date Status DME  Comments   Sit Balance 20  supervision      Stand Balance 20 S/SBA Bariatric w. Todd Solis     [x] Tub  [] Shower   Transfer 20 CGA   Extended tub bench, w/w     Commode   Transfer 20 CGA Drop arm bariatric BSC, ww    Functional   Mobility 20 S/SBA      Mod I  Bariatric w. Walker       W/c propulsion Less than house hold distances    Other: supine <> EOB       Sit <> stand  20 Sup        S/SBA   Bed rail       Bariatric ww   v/c's for hand placement and safety       V/c's for hand placement      Functional Exercises / Activity:  Pt completed B UE ex's with 3 x 5 reps w/c push ups focusing UE strength/endurance to increase independence with transfers/mobility and ADL tasks; good results no c/o fatigue to complete ex's.        Sensory / Neuromuscular Re-Education:      Cognitive Skills:   Status Comments   Problem   Solving fair + demo'd during ADL's, arm ex's and w/c mobility     Memory fair + demo'd during ADL's,arm ex's and w/c mobility    Sequencing fair + \"   Safety fair +
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
## 2020-06-23 NOTE — DISCHARGE INSTR - DIET

## 2020-06-23 NOTE — PROGRESS NOTES
CLINICAL PHARMACY NOTE: MEDS TO [address] [facility] Patient?: No  Total # of Prescriptions Filled: 10   The following medications were delivered to the patient:  · Ammonium lactate 12%  · cilostazol 50 mg  · Polyethylene glycol  · hydrophor oint  · Tizanidine 2 mg  · Lispro 100 unit  · Gabapentin 400 mg  · Bisacodyl EC 5 mg  · Metoprolol 25 mg  · cilostazol 5 mg  Total # of Interventions Completed: 3  Time Spent (min): 30    Additional Documentation:  Dr d/c linezolid per epic

## 2020-06-24 NOTE — DISCHARGE SUMMARY
amputation. 2.  Type 1 diabetes mellitus. 3.  Diabetic neuropathy in the left lower extremity. 4.  Peripheral vascular disease. 5.  Hypertension. 6.  Chronic renal impairment, improved.         Lornea Koo MD    D: 06/23/2020 8:13:50       T: 06/23/2020 8:18:39     [email or web address]  Job#: [id number]     Doc#: [id number]    CC:

## 2020-06-25 RX ORDER — LANCETS 30 GAUGE
1 EACH MISCELLANEOUS 4 TIMES DAILY
Qty: 4 EACH | Refills: 5 | Status: SHIPPED
Start: 2020-06-25 | End: 2020-07-22

## 2020-06-26 ENCOUNTER — TELEPHONE (OUTPATIENT)
Dept: VASCULAR SURGERY | Age: 65
End: 2020-06-26

## 2020-07-02 ENCOUNTER — TELEPHONE (OUTPATIENT)
Dept: PRIMARY CARE CLINIC | Age: 65
End: 2020-07-02

## 2020-07-02 ENCOUNTER — OFFICE VISIT (OUTPATIENT)
Dept: PRIMARY CARE CLINIC | Age: 65
End: 2020-07-02
Payer: COMMERCIAL

## 2020-07-02 VITALS
OXYGEN SATURATION: 98 % | RESPIRATION RATE: 18 BRPM | HEART RATE: 84 BPM | TEMPERATURE: 98.2 F | DIASTOLIC BLOOD PRESSURE: 98 MMHG | SYSTOLIC BLOOD PRESSURE: 196 MMHG

## 2020-07-02 PROCEDURE — G8427 DOCREV CUR MEDS BY ELIG CLIN: HCPCS | Performed by: FAMILY MEDICINE

## 2020-07-02 PROCEDURE — 99214 OFFICE O/P EST MOD 30 MIN: CPT | Performed by: FAMILY MEDICINE

## 2020-07-02 PROCEDURE — 3046F HEMOGLOBIN A1C LEVEL >9.0%: CPT | Performed by: FAMILY MEDICINE

## 2020-07-02 PROCEDURE — G8417 CALC BMI ABV UP PARAM F/U: HCPCS | Performed by: FAMILY MEDICINE

## 2020-07-02 PROCEDURE — 1111F DSCHRG MED/CURRENT MED MERGE: CPT | Performed by: FAMILY MEDICINE

## 2020-07-02 PROCEDURE — 3017F COLORECTAL CA SCREEN DOC REV: CPT | Performed by: FAMILY MEDICINE

## 2020-07-02 PROCEDURE — 4004F PT TOBACCO SCREEN RCVD TLK: CPT | Performed by: FAMILY MEDICINE

## 2020-07-02 PROCEDURE — 2022F DILAT RTA XM EVC RTNOPTHY: CPT | Performed by: FAMILY MEDICINE

## 2020-07-02 RX ORDER — OXYCODONE HYDROCHLORIDE 5 MG/1
5 TABLET ORAL EVERY 6 HOURS PRN
Qty: 120 TABLET | Refills: 0 | Status: SHIPPED
Start: 2020-07-02 | End: 2020-07-21 | Stop reason: SDUPTHER

## 2020-07-02 RX ORDER — OXYCODONE HCL 10 MG/1
10 TABLET, FILM COATED, EXTENDED RELEASE ORAL EVERY 12 HOURS SCHEDULED
Qty: 60 EACH | Refills: 0 | Status: SHIPPED
Start: 2020-07-02 | End: 2020-07-21 | Stop reason: SDUPTHER

## 2020-07-02 ASSESSMENT — ENCOUNTER SYMPTOMS
BACK PAIN: 0
CONSTIPATION: 1
BLOOD IN STOOL: 0
DIARRHEA: 0
PHOTOPHOBIA: 0
SHORTNESS OF BREATH: 0

## 2020-07-02 NOTE — TELEPHONE ENCOUNTER
[facility] Employee pharmacy calling, oxycontin is not covered by patients insurance. Also, oxycodone can not be filled due to patient filling percocet back on 6/16/20. Has prescription been changed?   Please advise, contact pharmacy at [phone]

## 2020-07-02 NOTE — PROGRESS NOTES
Post-Discharge Transitional Care Management Services or Hospital Follow Up      Tiffani Grace   YOB: 1955    Date of Office Visit:  7/2/2020  Date of Hospital Admission: 6/4/20  Date of Hospital Discharge: 6/23/20  Readmission Risk Score(high >=14%.  Medium >=10%):Readmission Risk Score: 24      Care management risk score Rising risk (score 2-5) and Complex Care (Scores >=6): 6     Non face to face  following discharge, date last encounter closed (first attempt may have been earlier): *No documented post hospital discharge outreach found in the last 14 days *No documented post hospital discharge outreach found in the last 14 days    Call initiated 2 business days of discharge: *No response recorded in the last 14 days     Patient Active Problem List   Diagnosis    DM II (diabetes mellitus, type II), controlled ([address]    HTN (hypertension)    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 ([address]    Coagulopathy ([address]    Moderate protein-calorie malnutrition ([address]    PVD (peripheral vascular disease) ([address]    Leukocytosis    Stage 3 chronic kidney disease ([address]    Tobacco dependence    HLD (hyperlipidemia)    History of DVT (deep vein thrombosis)    Osteomyelitis (HCC)    S/P AKA (above knee amputation), right ([address]    History of vascular surgery    Occlusion of common femoral artery (HCC)    Cellulitis, scrotum    Hyperglycemia due to type 2 diabetes mellitus ([address]    Critical lower limb ischemia    Gas gangrene of thigh ([address]    Vascular occlusion    Wound infection    Postoperative wound infection    Ischemic ulcer of right thigh with fat layer exposed ([address]    Ischemic ulcer of right thigh with necrosis of muscle ([address]    Above knee amputation of right lower extremity ([address]    Postoperative pain    Encounter for dental examination and cleaning with abnormal findings    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 aspirin 81 MG chewable tablet  Take 1 tablet by mouth daily             atorvastatin (LIPITOR) 10 MG tablet  Take 10 mg by mouth daily              bisacodyl (DULCOLAX) 5 MG EC tablet  Take 1 tablet by mouth daily as needed for Constipation             cilostazol (PLETAL) 50 MG tablet  Take 1 tablet by mouth 2 times daily             cloNIDine (CATAPRES) 0.1 MG tablet  TAKE 1 TABLET BY MOUTH 3 TIMES A DAY             docusate sodium (COLACE) 100 MG capsule  Take 1 capsule by mouth 2 times daily             DULoxetine (CYMBALTA) 60 MG extended release capsule  TAKE 2 CAPSULES BY MOUTH EVERY MORNING             gabapentin (NEURONTIN) 400 MG capsule  Take 1 capsule by mouth 3 times daily for 30 days.              glucose (GLUTOSE) 40 % GEL  Take 37.5 mLs by mouth as needed (hypoglycemia)             Handicap Placard MISC  by Does not apply route Patient cannot walk 200 ft without stopping to rest.    Expiration 10/21/2024             insulin lispro (HUMALOG) 100 UNIT/ML injection vial  Inject 0-12 Units into the skin 3 times daily (with meals)             Insulin Syringe-Needle U-100 30G X 5/16\" 0.3 ML MISC  1 box by Does not apply route 5 times daily             LANCETS MICRO THIN 90G MISC  1 applicator by Does not apply route daily             metoprolol tartrate (LOPRESSOR) 25 MG tablet  Take 1 tablet by mouth 2 times daily             mineral oil-hydrophilic petrolatum (HYDROPHOR) ointment  Apply topically as needed. naloxone 4 MG/0.1ML LIQD nasal spray  1 spray by Nasal route as needed for Opioid Reversal             ONE TOUCH ULTRA TEST strip  USE TO TEST BLOOD SUGARS DAILY AS NEEDED             OneTouch Delica Lancets 59Z MISC  1 box by Does not apply route 4 times daily             oxyCODONE (OXYCONTIN) 10 MG extended release tablet  Take 1 tablet by mouth every 12 hours for 30 days. oxyCODONE (ROXICODONE) 5 MG immediate release tablet  Take 1 tablet by mouth every 6 hours as needed for Pain for up to 30 days. polyethylene glycol (GLYCOLAX) 17 g packet  Take 17 g by mouth daily as needed for Constipation             spironolactone (ALDACTONE) 50 MG tablet  Take 50 mg by mouth every morning              tiZANidine (ZANAFLEX) 2 MG tablet  Take 1 tablet by mouth 3 times daily                   Medications marked \"taking\" at this time  Outpatient Medications Marked as Taking for the 7/2/20 encounter (Office Visit) with Elisabeth Kline DO   Medication Sig Dispense Refill    oxyCODONE (OXYCONTIN) 10 MG extended release tablet Take 1 tablet by mouth every 12 hours for 30 days. 60 each 0    oxyCODONE (ROXICODONE) 5 MG immediate release tablet Take 1 tablet by mouth every 6 hours as needed for Pain for up to 30 days. 120 tablet 0        Medications patient taking as of now reconciled against medications ordered at time of hospital discharge: Yes    Chief Complaint   Patient presents with   [address] from Hospital       HPI    Inpatient course: Discharge summary reviewed- see chart. Interval history/Current status: Patient states that he is doing slightly better since discharge from hospital.  Patient had significant surgical intervention since last seen. Patient states that his current analgesia with 10 mg of OxyContin twice daily and 5 mg of oxycodone 3 times daily has been controlling his pain well since last surgery better than the previous medication.   Patient was TempSrc: Oral   SpO2: 98%     There is no height or weight on file to calculate BMI. Wt Readings from Last 3 Encounters:   06/19/20 240 lb (108.9 kg)   04/16/20 (!) 335 lb (152 kg)   03/27/20 (!) 332 lb 11.2 oz (150.9 kg)     BP Readings from Last 3 Encounters:   07/02/20 (!) 196/98   06/23/20 (!) 190/91   05/04/20 128/72       Physical Exam  HENT:      Head: Normocephalic and atraumatic. Mouth/Throat:      Dentition: Abnormal dentition (edentulous upper). Eyes:      General: No scleral icterus. Conjunctiva/sclera: Conjunctivae normal.      Pupils: Pupils are equal, round, and reactive to light. Neck:      Musculoskeletal: Neck supple. Thyroid: No thyromegaly. Cardiovascular:      Rate and Rhythm: Normal rate and regular rhythm. Heart sounds: Normal heart sounds. No murmur. Pulmonary:      Effort: Pulmonary effort is normal.      Breath sounds: Normal breath sounds. No rales. Abdominal:      General: Bowel sounds are decreased. There is no distension. Palpations: Abdomen is soft. Tenderness: There is no abdominal tenderness. Musculoskeletal: Normal range of motion. Legs:    Lymphadenopathy:      Cervical: No cervical adenopathy. Skin:     General: Skin is warm and dry. Findings: No erythema or rash. Neurological:      Mental Status: He is alert and oriented to person, place, and time. Cranial Nerves: No cranial nerve deficit. Psychiatric:         Judgment: Judgment normal.               Assessment/Plan:   Diagnosis Orders   1. PVD (peripheral vascular disease) ([facility]  [place] DISCHARGE MEDS RECONCILED W/ CURRENT OUTPATIENT MED LIST    DME Order for (Specify) as OP   2. Above knee amputation of right lower extremity (HCC)  oxyCODONE (OXYCONTIN) 10 MG extended release tablet    oxyCODONE (ROXICODONE) 5 MG immediate release tablet    DME Order for (Specify) as OP   3. Acute kidney injury ([address]     4. Coagulopathy ([facility]     5.  Controlled type 2 diabetes mellitus with other specified complication, with long-term current use of insulin ([address]     6. Hypertension, unspecified type     7. Postoperative wound infection     8. S/P AKA (above knee amputation), right ([address]     9. Tobacco dependence       Time we will attempt to get the OxyContin/oxycodone combination approved. Advised the patient to continue with current diabetic regimen as it seems to be controlling his symptoms. We will send order for low air loss mattress for his hospital bed as this may help with preventing pressure ulcers and help him become more comfortable so that he may rest better. Advised the patient to continue monitoring his blood pressure and let us know Monday if it is still elevated. We may need to adjust some of his medications. See him back in 1 month or sooner if needed. We will have the patient obtain blood work prior to next visit. Ke Solomon D.O.   4:56 PM  7/2/2020       This document may have been prepared at least partially through the use of voice recognition software. Although effort is taken to assure the accuracy of this document, it is possible that grammatical, syntax,  or spelling errors may occur.       Medical Decision Making: high complexity

## 2020-07-20 NOTE — TELEPHONE ENCOUNTER
Patient calling he did not fill scripts in the 14 day window asking if they could be sent in again-pended oxycontin, roxicodone

## 2020-07-21 ENCOUNTER — TELEPHONE (OUTPATIENT)
Dept: PRIMARY CARE CLINIC | Age: 65
End: 2020-07-21

## 2020-07-21 RX ORDER — OXYCODONE HCL 10 MG/1
10 TABLET, FILM COATED, EXTENDED RELEASE ORAL EVERY 12 HOURS SCHEDULED
Qty: 60 EACH | Refills: 0 | Status: SHIPPED
Start: 2020-07-21 | End: 2020-08-19 | Stop reason: SDUPTHER

## 2020-07-21 RX ORDER — OXYCODONE HYDROCHLORIDE 5 MG/1
5 TABLET ORAL EVERY 6 HOURS PRN
Qty: 120 TABLET | Refills: 0 | Status: SHIPPED
Start: 2020-07-21 | End: 2020-08-19 | Stop reason: SDUPTHER

## 2020-07-21 NOTE — TELEPHONE ENCOUNTER
Yes please. Patient has been on the medication in the past which has been ineffective in controlling his current level of pain. He was on this medication in the hospital and stated that it helped significantly more than what is approved by the insurance.

## 2020-07-22 RX ORDER — BLOOD SUGAR DIAGNOSTIC
STRIP MISCELLANEOUS
Qty: 100 EACH | Refills: 2 | Status: SHIPPED | OUTPATIENT
Start: 2020-07-22 | End: 2020-12-04 | Stop reason: SDUPTHER

## 2020-07-22 RX ORDER — LANCETS 33 GAUGE
EACH MISCELLANEOUS
Qty: 200 EACH | Refills: 0 | Status: SHIPPED
Start: 2020-07-22 | End: 2020-08-19

## 2020-07-23 ENCOUNTER — TELEPHONE (OUTPATIENT)
Dept: PRIMARY CARE CLINIC | Age: 65
End: 2020-07-23

## 2020-07-23 NOTE — TELEPHONE ENCOUNTER
Marie Kline the [facility] nurse is calling to advise you on the pt.  She was there today and was talking to him and because of his amputee and being trapped in the house the pt is extremely depressed, the pt has an appointment 8/3 but she wanted to advise you of this

## 2020-07-23 NOTE — TELEPHONE ENCOUNTER
Fax received from [address] [place] [address], [phone] requesting an order for a patient. Fax will not go through with fax number given on page. Left message with Jannette Hdez at [phone] for a new fax number or email.

## 2020-07-24 ENCOUNTER — TELEPHONE (OUTPATIENT)
Dept: PRIMARY CARE CLINIC | Age: 65
End: 2020-07-24

## 2020-07-24 RX ORDER — MIRTAZAPINE 7.5 MG/1
7.5 TABLET, FILM COATED ORAL NIGHTLY
Qty: 30 TABLET | Refills: 5 | Status: SHIPPED
Start: 2020-07-24 | End: 2020-08-03 | Stop reason: SDUPTHER

## 2020-07-24 NOTE — TELEPHONE ENCOUNTER
Patient calling his insurance will not pay for the 10mg oxycontin xr asking if something could be called in to replace med.    [facility] [employer] employee pharmacy hollie benjamin

## 2020-07-28 ENCOUNTER — TELEPHONE (OUTPATIENT)
Dept: VASCULAR SURGERY | Age: 65
End: 2020-07-28

## 2020-07-28 NOTE — TELEPHONE ENCOUNTER
Pt's home health nurse called stating there is a moderate amt of serosanguinous drainage from pt's (R) thigh. Unable to pack as most of the wound is closed.   No infection, temp, etc.    He needs to see me in the center thanks pk    I called and left message for andrade to call me back

## 2020-07-30 ENCOUNTER — TELEPHONE (OUTPATIENT)
Dept: PRIMARY CARE CLINIC | Age: 65
End: 2020-07-30

## 2020-07-31 NOTE — TELEPHONE ENCOUNTER
This was approved. Notated in original telephone encounter. Pharmacy notified. He can contact pharmacy. Attempted to call patient, busy.

## 2020-08-03 ENCOUNTER — OFFICE VISIT (OUTPATIENT)
Dept: PRIMARY CARE CLINIC | Age: 65
End: 2020-08-03
Payer: COMMERCIAL

## 2020-08-03 VITALS
DIASTOLIC BLOOD PRESSURE: 68 MMHG | OXYGEN SATURATION: 97 % | RESPIRATION RATE: 16 BRPM | SYSTOLIC BLOOD PRESSURE: 136 MMHG | TEMPERATURE: 97.5 F | HEART RATE: 84 BPM

## 2020-08-03 LAB — HBA1C MFR BLD: 6.9 %

## 2020-08-03 PROCEDURE — G0447 BEHAVIOR COUNSEL OBESITY 15M: HCPCS | Performed by: FAMILY MEDICINE

## 2020-08-03 PROCEDURE — 99497 ADVNCD CARE PLAN 30 MIN: CPT | Performed by: FAMILY MEDICINE

## 2020-08-03 PROCEDURE — 83036 HEMOGLOBIN GLYCOSYLATED A1C: CPT | Performed by: FAMILY MEDICINE

## 2020-08-03 PROCEDURE — G0446 INTENS BEHAVE THER CARDIO DX: HCPCS | Performed by: FAMILY MEDICINE

## 2020-08-03 PROCEDURE — G0438 PPPS, INITIAL VISIT: HCPCS | Performed by: FAMILY MEDICINE

## 2020-08-03 PROCEDURE — G8431 POS CLIN DEPRES SCRN F/U DOC: HCPCS | Performed by: FAMILY MEDICINE

## 2020-08-03 PROCEDURE — 3044F HG A1C LEVEL LT 7.0%: CPT | Performed by: FAMILY MEDICINE

## 2020-08-03 PROCEDURE — 3017F COLORECTAL CA SCREEN DOC REV: CPT | Performed by: FAMILY MEDICINE

## 2020-08-03 RX ORDER — ZOLPIDEM TARTRATE 5 MG/1
5 TABLET ORAL NIGHTLY PRN
Qty: 30 TABLET | Refills: 3 | Status: SHIPPED | OUTPATIENT
Start: 2020-08-03 | End: 2020-09-02

## 2020-08-03 RX ORDER — AMLODIPINE BESYLATE 10 MG/1
TABLET ORAL
COMMUNITY
Start: 2020-07-20 | End: 2020-09-09

## 2020-08-03 RX ORDER — MIRTAZAPINE 15 MG/1
15 TABLET, FILM COATED ORAL NIGHTLY
Qty: 30 TABLET | Refills: 5 | Status: SHIPPED
Start: 2020-08-03 | End: 2020-11-25 | Stop reason: DRUGHIGH

## 2020-08-03 ASSESSMENT — PATIENT HEALTH QUESTIONNAIRE - PHQ9: SUM OF ALL RESPONSES TO PHQ QUESTIONS 1-9: 13

## 2020-08-03 NOTE — PROGRESS NOTES
Medicare Annual Wellness Visit  Name: Marta Giles Date: 8/3/2020   MRN: [id number] Sex: Male   Age: 60 y.o. Ethnicity: Non-/Non    : 1955 Race: Black      Ania Chappell is here for 1 Month Follow-Up (started oxycontin 10mg, states working) and [employer] for behavioral, psychosocial and functional/safety risks, and cognitive dysfunction are all negative except as indicated below. These results, as well as other patient data from the [address] form, are documented in Flowsheets linked to this Encounter. No Known Allergies    Prior to Visit Medications    Medication Sig Taking? Authorizing Provider   mirtazapine (REMERON) 15 MG tablet Take 1 tablet by mouth nightly Yes Juanito Kline, DO   ONETOUCH ULTRA strip USE TO TEST BLOOD SUGARS DAILY AS NEEDED Yes Juanito Kline, DO   Lancets (ONETOUCH DELICA PLUS [id number]) MISC USE TO TEST BLOOD SUGAR 4 TIMES DAILY Yes Juanito Kline, DO   oxyCODONE (OXYCONTIN) 10 MG extended release tablet Take 1 tablet by mouth every 12 hours for 30 days. Yes Juanito Kline, DO   oxyCODONE (ROXICODONE) 5 MG immediate release tablet Take 1 tablet by mouth every 6 hours as needed for Pain for up to 30 days. Yes Juanito Kline, DO   metoprolol tartrate (LOPRESSOR) 25 MG tablet Take 1 tablet by mouth 2 times daily Yes Dalia Ramirez MD   glucose (GLUTOSE) 40 % GEL Take 37.5 mLs by mouth as needed (hypoglycemia) Yes Eddie Gomez MD   insulin lispro (HUMALOG) 100 UNIT/ML injection vial Inject 0-12 Units into the skin 3 times daily (with meals) Yes Eddie Gomez MD   bisacodyl (DULCOLAX) 5 MG EC tablet Take 1 tablet by mouth daily as needed for Constipation Yes Dalia Ramirez MD   cilostazol (PLETAL) 50 MG tablet Take 1 tablet by mouth 2 times daily Yes Eddie Gomez MD   gabapentin (NEURONTIN) 400 MG capsule Take 1 capsule by mouth 3 times daily for 30 days.  Yes Dalia Ramirez MD   tiZANidine (ZANAFLEX) 2 MG tablet Take 1 tablet by mouth 3 times daily Yes Benjamín Addison MD   ammonium lactate (LAC-HYDRIN) 12 % lotion Apply topically as needed. Yes Benjamín Addison MD   mineral oil-hydrophilic petrolatum (HYDROPHOR) ointment Apply topically as needed.  Yes Benjamín Addison MD   docusate sodium (COLACE) 100 MG capsule Take 1 capsule by mouth 2 times daily Yes Sandie Rapp MD   DULoxetine (CYMBALTA) 60 MG extended release capsule TAKE 2 CAPSULES BY MOUTH EVERY MORNING Yes Juanito Kline, DO   cloNIDine (CATAPRES) 0.1 MG tablet TAKE 1 TABLET BY MOUTH 3 TIMES A DAY Yes Juanito Kline, DO   Handicap Placard MISC by Does not apply route Patient cannot walk 200 ft without stopping to rest.    Expiration 10/21/2024 Yes Juanito Kline, DO   LANCETS MICRO THIN 26I MISC 1 applicator by Does not apply route daily Yes Juanito Kline, DO   Insulin Syringe-Needle U-100 30G X 5/16\" 0.3 ML MISC 1 box by Does not apply route 5 times daily Yes Juanito Kline, DO   spironolactone (ALDACTONE) 50 MG tablet Take 50 mg by mouth every morning  Yes Historical Provider, MD   atorvastatin (LIPITOR) 10 MG tablet Take 10 mg by mouth daily  Yes Historical Provider, MD   amLODIPine (NORVASC) 10 MG tablet   Historical Provider, MD   amLODIPine (NORVASC) 5 MG tablet Take 1 tablet by mouth daily  Nathalie Mari MD   naloxone 4 MG/0.1ML LIQD nasal spray 1 spray by Nasal route as needed for Opioid Reversal  Patient not taking: Reported on 8/3/2020  Nathalie Mari MD   acetaminophen (TYLENOL) 325 MG tablet Take 650 mg by mouth every 4 hours as needed for Pain or Fever  Historical Provider, MD   aspirin 81 MG chewable tablet Take 1 tablet by mouth daily  Patient not taking: Reported on 8/3/2020  Dayna Calvo MD       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 5/22/2018    DVT, lower extremity (HCC)     right leg     Gas gangrene of thigh ([facility] 3/20/2020    Hyperlipidemia     Hypertension     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[facility]     PVD (peripheral vascular disease) ([facility]        Past Surgical History:   Procedure Laterality Date    AMPUTATION ABOVE KNEE Right 4/28/2020    DEBRIDEMENT OF AMPUTATION RIGHT ABOVE KNEE performed by Curtis Iglesias MD at [address] [facility] Right 4/30/2020    DEBRIDEMENT OF AMPUTATION RIGHT ABOVE KNEE,  POSS. ABOVE KNEE REVISION, POSS. CLOSURE, POSS.WOUND VAC -- BEN Nix / Ute Thomason TO LOOK IN performed by Curtis Iglesias MD at [place] [address] Right 3/20/2020    RIGHT LOWER EXTREMITY THROMBECTOMY, POSSIBLE ANGIOGRAM, POSSIBLE INTERVENTION, POSSIBLE BYPASS. performed by Curtis Iglesias MD at [address] Right 4/17/2020    RIGHT LEG DEBRIDEMENT INCISION AND DRAINAGE performed by Stacy Rust MD at [address] Right 4/20/2020    RIGHT THIGH WOUND DRESSING CHANGE; DEBRIDEMENT, removal of muscle performed by Stacy Rust MD at [address] Right 4/21/2020    RIGHT THIGH WOUND DRESSING CHANGE POSSIBLE  DEBRIDEMENT - NEEDS BEN Nix / Meng Naranjo TO SEE performed by Giovanny Torres MD at [address] Right 4/23/2020    RIGHT THIGH WOUND DRESSING CHANGE and DEBRIDEMENT performed by Curtis Iglesias MD at [facility] Right 11/1/2018    AMPUTATION ABOVE KNEE RIGHT LEG performed by Curtis Iglesias MD at [facility] Right 5/24/2018    RIGHT FOOT INCISION AND DRAINAGE WITH PARTIAL BONE RESECTION performed by aMson Carbajal DPM at [address] [place] [address] OFFICE/OUTPT VISIT,PROCEDURE ONLY Right 8/3/2018    INCISION AND DRAINAGE MULTIPLE AREAS RIGHT FOOT WITH DEBRIDEMENT SOFT TISSUE performed by Isma Campbell DPM at [facility] Right     leg        Family History   Problem Relation Age of Onset    Cancer Mother     Diabetes Father     Heart Failure Father     Hypertension Father     Cancer Sister        CareTeam (Including outside providers/suppliers regularly involved in providing care):   Patient Care Team:  Rosalie Alatorre DO as PCP - General (Family Medicine)  Rosalie Alatorre DO as PCP - [facility] Empaneled Provider    Wt Readings from Last 3 Encounters:   06/19/20 240 lb (108.9 kg)   04/16/20 (!) 335 lb (152 kg)   03/27/20 (!) 332 lb 11.2 oz (150.9 kg)     Vitals:    08/03/20 1109   BP: 136/68   Pulse: 84   Resp: 16   Temp: 97.5 °F (36.4 °C)   SpO2: 97%     There is no height or weight on file to calculate BMI. Based upon direct observation of the patient, evaluation of cognition reveals recent and remote memory intact. Physical Exam  HENT:      Head: Normocephalic and atraumatic. Mouth/Throat:      Dentition: Abnormal dentition (edentulous upper). Eyes:      General: No scleral icterus. Conjunctiva/sclera: Conjunctivae normal.      Pupils: Pupils are equal, round, and reactive to light. Neck:      Musculoskeletal: Neck supple. Thyroid: No thyromegaly. Cardiovascular:      Rate and Rhythm: Normal rate and regular rhythm. Heart sounds: Normal heart sounds. No murmur. Pulmonary:      Effort: Pulmonary effort is normal.      Breath sounds: Normal breath sounds. No rales. Abdominal:      General: Bowel sounds are decreased. There is no distension. Palpations: Abdomen is soft. Tenderness: There is no abdominal tenderness. Musculoskeletal: Normal range of motion. Legs:    Lymphadenopathy:      Cervical: No cervical adenopathy. Skin:     General: Skin is warm and dry. Findings: No erythema or rash. Neurological:      Mental Status: He is alert and oriented to person, place, and time. Cranial Nerves: No cranial nerve deficit. Psychiatric:         Judgment: Judgment normal.        Patient's complete Health Risk Assessment and screening values have been reviewed and are found in Flowsheets. The following problems were reviewed today and where indicated follow up appointments were made and/or referrals ordered. Positive Risk Factor Screenings with Interventions:     Fall Risk:  Timed Up and Go Test > 12 seconds? (Complete if either Fall Risk answers are Yes): (Not performed secondary to need for prosthetic)  2 or more falls in past year?: (!) yes  Fall with injury in past year?: (!) yes  Fall Risk Interventions:    · Home safety tips provided  · We will order physical therapy after the resolution of his lower extremity wound status post right AKA    Depression:  PHQ-2 Score: 6  PHQ-9 Total Score: 13  Depression Screening Interpretation: (!) PHQ-9 Score 10-14: Moderate Depression  Depression Interventions:  · LPN INTERVENTION GUIDE: SCORE 5-14 = MODERATE DEPRESSION: FOLLOW UP IN 1 WEEK  · Patient advised to follow-up in this office for further evaluation and treatment within 1 week  · Patient advised to follow-up in this office for further evaluation and treatment within 3 month(s)  · Medication prescribed- Cymbalta and increased Remeron.     Substance Abuse:  Social History     Tobacco History     Smoking Status  Current Every Day Smoker Smoking Frequency  0.5 packs/day for 7 years (3.5 pk yrs) Smoking Tobacco Type  Cigarettes    Smokeless Tobacco Use  Never Used    Tobacco Comment  5-7 a day ,QUIT 3 MONTHS AGO          Alcohol History     Alcohol Use Status  No          Drug Use     Drug Use Status  No          Sexual Activity     Sexually Active  Not Asked                  Substance Abuse Interventions:  · Tobacco abuse:  tobacco cessation tips and resources provided, patient agrees to think about his/her triggers for tobacco use, why he/she should quit, and learn more about the harmful effects of tobacco    General Health:  General  In general, how would you say your health is?: (!) Poor  In the past 7 days, have you experienced any of the following? New or Increased Pain, New or Increased Fatigue, Loneliness, Social Isolation, Stress or Anger?: (!) New or Increased Pain  Do you get the social and emotional support that you need?: (!) No  Do you have a Living Will?: (!) No  General Health Risk Interventions:  · Poor self-assessment of health status: Medications adjusted today. Follow-up in 3 months. · Pain issues: home exercises provided, To new current pain medication regimen. Physical therapy once wound heals. · Social isolation: Patient states he is still having issues with transition from extended hospital stay to home. · Stress: relaxation techniques discussed  · Inadequate social/emotional support: As noted above still having issues with transition from extended hospital stay and loss of right leg. · No Living Will: additional information provided Patient states that he will address before next visit. Health Habits/Nutrition:  Health Habits/Nutrition  Do you exercise for at least 20 minutes 2-3 times per week?: (!) No  Have you lost any weight without trying in the past 3 months?: No  Do you eat fewer than 2 meals per day?: (!) Yes  Have you seen a dentist within the past year?: (!) No  There is no height or weight on file to calculate BMI.   Health Habits/Nutrition Interventions:  · Inadequate physical activity:  Patient unable to currently increase activity level secondary to recent right-sided AKA  · Nutritional issues:  educational materials for healthy, well-balanced diet provided, educational materials to promote weight loss provided  · Dental exam overdue:  patient encouraged to make appointment with his/her dentist    Hearing/Vision:  No exam data present  Hearing/Vision  Do you or your family notice any trouble with your hearing?: No  Do you have difficulty driving, watching TV, or doing any of your daily activities because of your eyesight?: No  Have you had an eye exam within the past year?: (!) No  Hearing/Vision Interventions:  · Vision concerns:  patient encouraged to make appointment with his/her eye specialist    ADL:  ADLs  In the past 7 days, did you need help from others to perform any of the following everyday activities? Eating, dressing, grooming, bathing, toileting, or walking/balance?: None  In the past 7 days, did you need help from others to take care of any of the following? Laundry, housekeeping, banking/finances, shopping, telephone use, food preparation, transportation, or taking medications?: [employer], Housekeeping, Transportation, Shopping  ADL Interventions:  · Patient does need assistance with most things regarding ADLs. Patient states he does have someone to help with cleaning, shopping, and transportation as needed. 2 nieces are in the office today.     Personalized Preventive Plan   Current Health Maintenance Status  Immunization History   Administered Date(s) Administered    Influenza A ([id number]) Vaccine PF IM 12/15/2009    Influenza Virus Vaccine 11/02/2017    Influenza, Quadv, IM, PF (6 mo and older Fluzone, Flulaval, Fluarix, and 3 yrs and older Afluria) 11/02/2017, 10/21/2019    Pneumococcal Conjugate 13-valent ([id number]) 02/22/2018    Pneumococcal Polysaccharide ([id number]) 10/21/2019        Health Maintenance   Topic Date Due    Diabetic retinal exam  12/13/1967    HIV screen  12/13/1972    DTaP/Tdap/Td vaccine (1 - Tdap) 12/13/1976    Colon cancer screen colonoscopy  12/13/2007    Lipid screen  05/23/2019    Annual Wellness Visit (AWV)  06/14/2019    Diabetic foot exam  10/24/2019    A1C test (Diabetic or Prediabetic)  07/16/2020    Shingles Vaccine (1 of 2) 01/20/2021 (Originally 12/13/2007)    Flu vaccine (1) 09/01/2020    Diabetic microalbuminuria test  05/02/2021    Potassium monitoring  06/18/2021    Creatinine monitoring  06/18/2021    Pneumococcal 0-64 years Vaccine  Completed    Hepatitis C screen  Completed    Hepatitis A vaccine  Aged Out    Hib vaccine  Aged Out    Meningococcal (ACWY) vaccine  Aged Out     Recommendations for [email or web address] Due: see orders and patient instructions/AVS.  . Recommended screening schedule for the next 5-10 years is provided to the patient in written form: see Patient Instructions/AVS.    Jayson Osborne was seen today for 1 month follow-up and medicare aw. Diagnoses and all orders for this visit:    Controlled type 2 diabetes mellitus with other specified complication, with long-term current use of insulin (HCC)  -     mirtazapine (REMERON) 15 MG tablet; Take 1 tablet by mouth nightly    Positive depression screening  -     Positive Screen for Clinical Depression with a Documented Follow-up Plan     Body mass index (bmi) 30.0-30.9, adult   -     OH Behavior  obesity 15m []    ACP (advance care planning)  -     [place] ADVANCED CARE PLAN FACE TO [email or web address], 1ST 30MIN [id number]    Screening for cardiovascular condition  -     [place] Intens behave ther cardio dx, 15 minutes []    Routine general medical examination at a health care facility  -     [place] Behavior  obesity 15m []        Patient currently stable in regards to his chronic issues. A1c is 6.9. No adjustments made today. We will increase the dosage of Remeron to help with insomnia as well as low-dose Ambien. Patient cautioned regarding pain medicine use and Ambien use. Patient voiced understanding. Continue all other medications. Needs a follow-up appointment with vascular surgeon he will call today. Patient will also have eye exam prior to next visit. See him back in 3 months or sooner if needed. Patient will be given Tdap and fit test at next visit.           Advance Care Planning   Advanced Care Planning:

## 2020-08-03 NOTE — PATIENT INSTRUCTIONS
Advance Directives: Care Instructions  Overview  An advance directive is a legal way to state your wishes at the end of your life. It tells your family and your doctor what to do if you can't say what you want. There are two main types of advance directives. You can change them any time your wishes change. Living will. This form tells your family and your doctor your wishes about life support and other treatment. The form is also called a declaration. Medical power of . This form lets you name a person to make treatment decisions for you when you can't speak for yourself. This person is called a health care agent (health care proxy, health care surrogate). The form is also called a durable power of  for health care. If you do not have an advance directive, decisions about your medical care may be made by a family member, or by a doctor or a  who doesn't know you. It may help to think of an advance directive as a gift to the people who care for you. If you have one, they won't have to make tough decisions by themselves. Follow-up care is a key part of your treatment and safety. Be sure to make and go to all appointments, and call your doctor if you are having problems. It's also a good idea to know your test results and keep a list of the medicines you take. What should you include in an advance directive? Many states have a unique advance directive form. (It may ask you to address specific issues.) Or you might use a universal form that's approved by many states. If your form doesn't tell you what to address, it may be hard to know what to include in your advance directive. Use the questions below to help you get started. · Who do you want to make decisions about your medical care if you are not able to? · What life-support measures do you want if you have a serious illness that gets worse over time or can't be cured? · What are you most afraid of that might happen? (Maybe you're afraid of having pain, losing your independence, or being kept alive by machines.)  · Where would you prefer to die? (Your home? A hospital? A nursing home?)  · Do you want to donate your organs when you die? · Do you want certain Yazidi practices performed before you die? When should you call for help? Be sure to contact your doctor if you have any questions. Where can you learn more? Go to [email or web address] and sign in to your [employer] account. Enter R264 in the [employer] box to learn more about \"Advance Directives: Care Instructions. \"     If you do not have an account, please click on the \"Sign Up Now\" link. Current as of: December 9, 2019               Content Version: 12.5  © 0340-9576 Healthwise, Incorporated. Care instructions adapted under license by [facility] ([facility]). If you have questions about a medical condition or this instruction, always ask your healthcare professional. [place] [address] any warranty or liability for your use of this information. Learning About Medical Power of   What is a medical power of ? A medical power of , also called a durable power of  for health care, is one type of the legal forms called advance directives. It lets you name the person you want to make treatment decisions for you if you can't speak or decide for yourself. The person you choose is called your health care agent. This person is also called a health care proxy or health care surrogate. A medical power of  may be called something else in your state. How do you choose a health care agent? Choose your health care agent carefully. This person may or may not be a family member. Talk to the person before you make your final decision. Make sure he or she is comfortable with this responsibility. It's a good idea to choose someone who:  · Is at least 25years old.   · Knows you well and understands what makes life meaningful for you. · Understands your Jewish and moral values. · Will do what you want, not what he or she wants. · Will be able to make difficult choices at a stressful time. · Will be able to refuse or stop treatment, if that is what you would want, even if you could die. · Will be firm and confident with health professionals if needed. · Will ask questions to get needed information. · Lives near you or agrees to travel to you if needed. Your family may help you make medical decisions while you can still be part of that process. But it's important to choose one person to be your health care agent in case you aren't able to make decisions for yourself. If you don't fill out the legal form and name a health care agent, the decisions your family can make may be limited. A health care agent may be called something else in your state. Who will make decisions for you if you don't have a health care agent? If you don't have a health care agent or a living will, you may not get the care you want. Decisions may be made by family members who disagree about your medical care. Or decisions may be made by a medical professional who doesn't know you well. In some cases, a  makes the decisions. When you name a health care agent, it is very clear who has the power to make health decisions for you. How do you name a health care agent? You name your health care agent on a legal form. This form is usually called a medical power of . Ask your hospital, state bar association, or office on aging where to find these forms. You must sign the form to make it legal. Some states require you to get the form notarized. This means that a person called a  watches you sign the form and then he or she signs the form. Some states also require that two or more witnesses sign the form. Be sure to tell your family members and doctors who your health care agent is.   Where can you learn more? Go to [email or web address].[email or web address] org and sign in to your [employer] account. Enter [id number] in the [facility] box to learn more about \"Learning About [address]. \"     If you do not have an account, please click on the \"Sign Up Now\" link. Current as of: December 9, 2019               Content Version: 12.5  © 0898-1545 Healthwise, [employer]. Care instructions adapted under license by [facility] ([facility]). If you have questions about a medical condition or this instruction, always ask your healthcare professional. [address] any warranty or liability for your use of this information. Learning About Low-Carbohydrate Diets  What is a low-carbohydrate diet? A low-carbohydrate (or \"low-carb\") diet limits foods and drinks that have carbohydrates. This includes grains, fruits, milk and yogurt, and starchy vegetables like potatoes, beans, and corn. It also avoids foods and drinks that have added sugar. Instead, low-carb diets include foods that are high in protein and fat. Why might you follow a low-carb diet? Low-carb diets may be used for a variety of reasons, such as for weight loss. People who have diabetes may use a low-carb diet to help manage their blood sugar levels. What should you do before you start the diet? Talk to your doctor before you try any diet. This is even more important if you have health problems like kidney disease, heart disease, or diabetes. Your doctor may suggest that you meet with a registered dietitian. A dietitian can help you make an eating plan that works for you. What foods do you eat on a low-carb diet? On a low-carb diet, you choose foods that are high in protein and fat. Examples of these are:  · Meat, poultry, and fish. · Eggs. · Nuts, such as walnuts, pecans, almonds, and peanuts. · Peanut butter and other nut butters. · Tofu. · Avocado. · Penelope Balint.   · Non-starchy vegetables like broccoli, cauliflower, green beans, mushrooms, peppers, lettuce, and spinach. · Unsweetened non-dairy milks like almond milk and coconut milk. · Cheese, cottage cheese, and cream cheese. Current as of: August 22, 2019               Content Version: 12.5  © 4720-6870 Healthwise, [employer]. Care instructions adapted under license by [facility] ([facility]). If you have questions about a medical condition or this instruction, always ask your healthcare professional. [place] [address] any warranty or liability for your use of this information. Personalized Preventive Plan for Sherly Jefferson - 8/3/2020  Medicare offers a range of preventive health benefits. Some of the tests and screenings are paid in full while other may be subject to a deductible, co-insurance, and/or copay. Some of these benefits include a comprehensive review of your medical history including lifestyle, illnesses that may run in your family, and various assessments and screenings as appropriate. After reviewing your medical record and screening and assessments performed today your provider may have ordered immunizations, labs, imaging, and/or referrals for you. A list of these orders (if applicable) as well as your Preventive Care list are included within your After Visit Summary for your review. Other Preventive Recommendations:    · A preventive eye exam performed by an eye specialist is recommended every 1-2 years to screen for glaucoma; cataracts, macular degeneration, and other eye disorders. · A preventive dental visit is recommended every 6 months. · Try to get at least 150 minutes of exercise per week or 10,000 steps per day on a pedometer . · Order or download the FREE \"Exercise & Physical Activity: Your Everyday Guide\" from [employer]. Call 3-987.949.1473 or search [employer] online. · You need 5645-9533 mg of calcium and 6030-5505 IU of vitamin D per day.  It is possible to meet your calcium requirement with diet alone, but a vitamin D supplement is usually necessary to meet this goal.  · When exposed to the sun, use a sunscreen that protects against both UVA and UVB radiation with an SPF of 30 or greater. Reapply every 2 to 3 hours or after sweating, drying off with a towel, or swimming. · Always wear a seat belt when traveling in a car. Always wear a helmet when riding a bicycle or motorcycle.

## 2020-08-05 ENCOUNTER — TELEPHONE (OUTPATIENT)
Dept: PRIMARY CARE CLINIC | Age: 65
End: 2020-08-05

## 2020-08-07 NOTE — TELEPHONE ENCOUNTER
Attempted to call [facility], busy and was sent to [facility]. Left a vm to see if they supply and a fax number if they do.

## 2020-08-10 RX ORDER — SYRINGE AND NEEDLE,INSULIN,1ML 31 GX5/16"
SYRINGE, EMPTY DISPOSABLE MISCELLANEOUS
Qty: 100 EACH | Refills: 3 | Status: SHIPPED | OUTPATIENT
Start: 2020-08-10 | End: 2020-12-04 | Stop reason: SDUPTHER

## 2020-08-17 ENCOUNTER — OFFICE VISIT (OUTPATIENT)
Dept: VASCULAR SURGERY | Age: 65
End: 2020-08-17
Payer: COMMERCIAL

## 2020-08-17 ENCOUNTER — TELEPHONE (OUTPATIENT)
Dept: VASCULAR SURGERY | Age: 65
End: 2020-08-17

## 2020-08-17 VITALS — BODY MASS INDEX: 30.8 KG/M2 | HEIGHT: 74 IN | WEIGHT: 240 LBS | RESPIRATION RATE: 16 BRPM

## 2020-08-17 PROCEDURE — 4004F PT TOBACCO SCREEN RCVD TLK: CPT | Performed by: SURGERY

## 2020-08-17 PROCEDURE — 3017F COLORECTAL CA SCREEN DOC REV: CPT | Performed by: SURGERY

## 2020-08-17 PROCEDURE — G8427 DOCREV CUR MEDS BY ELIG CLIN: HCPCS | Performed by: SURGERY

## 2020-08-17 PROCEDURE — G8417 CALC BMI ABV UP PARAM F/U: HCPCS | Performed by: SURGERY

## 2020-08-17 PROCEDURE — 99213 OFFICE O/P EST LOW 20 MIN: CPT | Performed by: PHYSICIAN ASSISTANT

## 2020-08-17 NOTE — TELEPHONE ENCOUNTER
Notified patient of appointment at [address] [place] [address] on 8-26-20 at 11:00 am.  [place] at admitting at 10:30 am.

## 2020-08-17 NOTE — PROGRESS NOTES
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   3/20/2020 R groin exploration with iliofemoral artery embolectomy, primary closure of arteriotomy, R deep femoral atery embolectomy   4/21/2020 Excisional debridement of infected necrotic  tissue, right groin and right thigh   4/23/2020 RIGHT THIGH WOUND DRESSING CHANGE and DEBRIDEMENT   4/28/2020 Irrigation and excisional debridement of Right Thigh above knee amputation wound   4/30/2020 Irrigation and excisional debridement of the Right thigh above knee amputation wound 25 x45 cm  Excision of prosthetic R LE bypass graft  Ligation of right SFA     HISTORY OF PRESENT ILLNESS:    The patient is a 60 y.o. male who is here in regards to follow up of R AKA stump site dehiscence and seroma. He has home health care who changes the dressing daily. He had been previously packing the wound with aquacell but patient notes that the nurses told him there isn't a big enough area to pack now so they are covering with aquacell, 4 x4 and abd pad. Pt has been having copious amounts of serous drainage from the wound. Denies pus, erythema, fevers, chills. 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 He takes gabapentin and roxicodone with some relief. He has no pain to the LLE. Denies any open wounds. 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 He continues to smoke about 8 cigarettes/day.     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 ([address] 5/22/2018    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 ([address] 5/21/2018    Critical lower limb ischemia 3/20/2020    Diabetes mellitus ([address]     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 2 diabetes mellitus, with fat layer exposed ([facility] 5/22/2018    DVT, lower extremity (HCC)     right leg     Gas gangrene of thigh ([facility] 3/20/2020    Hyperlipidemia     Hypertension     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[facility]     PVD (peripheral vascular disease) ([facility]      Past Surgical History:        Procedure Laterality Date    AMPUTATION ABOVE KNEE Right 4/28/2020    DEBRIDEMENT OF AMPUTATION RIGHT ABOVE KNEE performed by Florentin Koehler MD at [address] [place] [address] Right 4/30/2020    DEBRIDEMENT OF AMPUTATION RIGHT ABOVE KNEE,  POSS. ABOVE KNEE REVISION, POSS. CLOSURE, POSS.WOUND VAC -- BEN Solorio / Julia Pena TO LOOK IN performed by Florentin Koehler MD at [address] Right 3/20/2020    RIGHT LOWER EXTREMITY THROMBECTOMY, POSSIBLE ANGIOGRAM, POSSIBLE INTERVENTION, POSSIBLE BYPASS. performed by Florentin Koehler MD at [address] [place] [address] Right 4/17/2020    RIGHT LEG DEBRIDEMENT INCISION AND DRAINAGE performed by Zohra Piña MD at [address] [place] [address] Right 4/20/2020    RIGHT THIGH WOUND DRESSING CHANGE; DEBRIDEMENT, removal of muscle performed by Zohra Piña MD at [address] [place] [address] Right 4/21/2020    RIGHT THIGH WOUND DRESSING CHANGE POSSIBLE  DEBRIDEMENT - NEEDS BEN Solorio / Emi Ross TO SEE performed by Carmella Osei MD at [address] [place] [address] Right 4/23/2020    RIGHT THIGH WOUND DRESSING CHANGE and DEBRIDEMENT performed by Florentin Koehler MD at [address] [place], [place] [address]e Se Right 11/1/2018    AMPUTATION ABOVE KNEE RIGHT LEG performed by Florentin Koehler MD at [place] [address] Right 5/24/2018    RIGHT FOOT INCISION AND DRAINAGE WITH PARTIAL BONE RESECTION performed by Kelley Resendiz DPM at [facility] OFFICE/OUTPT VISIT,PROCEDURE ONLY Right 8/3/2018    INCISION AND DRAINAGE MULTIPLE AREAS RIGHT FOOT WITH DEBRIDEMENT SOFT TISSUE performed by Preston Linares DPM at [place] [address] Right     leg      Current Medications:   Current Outpatient Medications   Medication Sig Dispense Refill    ULTICARE INSULIN SYRINGE 31G X 5/16\" 0.3 ML MISC USE TO INJECT INSULIN FIVE TIMES A  each 3    amLODIPine (NORVASC) 10 MG tablet       mirtazapine (REMERON) 15 MG tablet Take 1 tablet by mouth nightly 30 tablet 5    zolpidem (AMBIEN) 5 MG tablet Take 1 tablet by mouth nightly as needed for Sleep for up to 30 days. 30 tablet 3    ONETOUCH ULTRA strip USE TO TEST BLOOD SUGARS DAILY AS NEEDED 100 each 2    Lancets (ONETOUCH DELICA PLUS [id number]) MISC USE TO TEST BLOOD SUGAR 4 TIMES DAILY 200 each 0    oxyCODONE (OXYCONTIN) 10 MG extended release tablet Take 1 tablet by mouth every 12 hours for 30 days. 60 each 0    oxyCODONE (ROXICODONE) 5 MG immediate release tablet Take 1 tablet by mouth every 6 hours as needed for Pain for up to 30 days. 120 tablet 0    metoprolol tartrate (LOPRESSOR) 25 MG tablet Take 1 tablet by mouth 2 times daily 60 tablet 3    glucose (GLUTOSE) 40 % GEL Take 37.5 mLs by mouth as needed (hypoglycemia) 45 g 1    insulin lispro (HUMALOG) 100 UNIT/ML injection vial Inject 0-12 Units into the skin 3 times daily (with meals) 1 vial 3    bisacodyl (DULCOLAX) 5 MG EC tablet Take 1 tablet by mouth daily as needed for Constipation 30 tablet 0    cilostazol (PLETAL) 50 MG tablet Take 1 tablet by mouth 2 times daily 60 tablet 3    gabapentin (NEURONTIN) 400 MG capsule Take 1 capsule by mouth 3 times daily for 30 days. 90 capsule 3    tiZANidine (ZANAFLEX) 2 MG tablet Take 1 tablet by mouth 3 times daily 90 tablet 0    ammonium lactate (LAC-HYDRIN) 12 % lotion Apply topically as needed. 1 Bottle 0    mineral oil-hydrophilic petrolatum (HYDROPHOR) ointment Apply topically as needed.  1 Tube 0    amLODIPine (NORVASC) 5 MG tablet Take 1 tablet by mouth daily 30 tablet 3    naloxone 4 MG/0.1ML LIQD nasal spray 1 spray Relationships    Social connections     Talks on phone: Not on file     Gets together: Not on file     Attends Congregational service: Not on file     Active member of club or organization: Not on file     Attends meetings of clubs or organizations: Not on file     Relationship status: Not on file    Intimate partner violence     Fear of current or ex partner: Not on file     Emotionally abused: Not on file     Physically abused: Not on file     Forced sexual activity: Not on file   Other Topics Concern    Not on file   Social History Narrative    Not on file     Family History   Problem Relation Age of Onset    Cancer Mother     Diabetes Father     Heart Failure Father     Hypertension Father     Cancer Sister      Labs  Lab Results   Component Value Date    WBC 8.6 06/18/2020    HGB 8.2 (L) 06/18/2020    HCT 26.3 (L) 06/18/2020     06/18/2020    PROTIME 14.7 (H) 04/27/2020    INR 1.3 04/27/2020    APTT 31.6 04/16/2020    K 4.2 06/18/2020    BUN 19 06/18/2020    CREATININE 1.9 (H) 06/18/2020     PHYSICAL EXAM:    Resp 16   Ht 6' 2\" (1.88 m)   Wt 240 lb (108.9 kg)   BMI 30.81 kg/m²   CONSTITUTIONAL:   Awake, alert, cooperative  PSYCHIATRIC :  Oriented to time, place and person     Appropriate insight to disease process  EYES: Lids and lashes normal  ENT:  External ears and nose without lesions   Hearing deficits absent  NECK: Supple, symmetrical, trachea midline  LUNGS:  No increased work of breathing                 Clear to auscultation  CARDIOVASCULAR:  regular rate and rhythm   ABDOMEN:  soft, non-distended, non-tender  SKIN:   Normal skin color   Texture and turgor normal, no induration  EXTREMITIES:   R LE AKA; 0.25 cm opening that tracts deep, >5 cm; copious amounts of serous drainage present on dressing  L LE Edema absent; no open wounds.   R femoral  L femoral    R posterior tibial aka L posterior tibial mono   R dorsalis pedis aka L dorsalis pedis Weakly biphasic     RADIOLOGY:    A/P PVD with hx of R AKA done for chronic nonhealing RLE wound   Hx of RLE critical ischemia of R AKA stump with infected, necrotic  wound s/p multiple excisional debridements  · Pt's wound has begun to close from the outside in but is still tracking deep  · Continue medical management with asa, pletal and statin  · Emphasized importance Tobacco cessation  · Pt continues to smoke 6-8 cigarettes/day  · they understood that with tobacco use they are at increased risk of worsening of their pvd and increased risk of limb loss  · Discussed with patient pathophysiology of pvd, claudication, tissues loss, rest pain, and potential for limb loss  · Discussed with patient symptoms of new onset claudication, tissue loss, rest pain, changes in lower extremity coolness, pain and they understood to go to ER immediately if any symptoms developed  · Wound is closing in but is still tracking deep; recommend seeing patient in wound care to widen the opening so that it can be packed to help it fill in  · Patient agreeable to this plan and we will see him next week in wound care    Pt seen and plan reviewed with Dr. Marissa Millan.      Lazaro Boudreaux PA-C

## 2020-08-19 RX ORDER — OXYCODONE HCL 10 MG/1
10 TABLET, FILM COATED, EXTENDED RELEASE ORAL EVERY 12 HOURS SCHEDULED
Qty: 60 EACH | Refills: 0 | Status: SHIPPED
Start: 2020-08-19 | End: 2020-09-17 | Stop reason: SDUPTHER

## 2020-08-19 RX ORDER — OXYCODONE HYDROCHLORIDE 5 MG/1
5 TABLET ORAL EVERY 6 HOURS PRN
Qty: 120 TABLET | Refills: 0 | Status: SHIPPED
Start: 2020-08-19 | End: 2020-09-17 | Stop reason: SDUPTHER

## 2020-08-19 RX ORDER — LANCETS 33 GAUGE
EACH MISCELLANEOUS
Qty: 200 EACH | Refills: 5 | Status: SHIPPED
Start: 2020-08-19 | End: 2020-10-13

## 2020-08-19 NOTE — TELEPHONE ENCOUNTER
[facility] dose supply. Stated it would require a prior authorization.  Faxed order, last ov note, insurance card to [phone]

## 2020-08-24 ENCOUNTER — TELEPHONE (OUTPATIENT)
Dept: PRIMARY CARE CLINIC | Age: 65
End: 2020-08-24

## 2020-08-24 NOTE — TELEPHONE ENCOUNTER
Shana Piedra from [address] [place] [address], [address] calling asking for an order for a bariatric walker due to change in ambulating due to amputation(please add to script) to be faxed to [phone]: Shana Piedra

## 2020-08-26 ENCOUNTER — HOSPITAL ENCOUNTER (OUTPATIENT)
Dept: WOUND CARE | Age: 65
Discharge: HOME | End: 2020-08-26
Payer: COMMERCIAL

## 2020-08-26 PROBLEM — T81.328S: Chronic | Status: ACTIVE | Noted: 2020-08-26

## 2020-08-26 PROBLEM — T81.32XS: Chronic | Status: ACTIVE | Noted: 2020-08-26

## 2020-08-26 PROCEDURE — 11042 DBRDMT SUBQ TIS 1ST 20SQCM/<: CPT | Performed by: SURGERY

## 2020-08-26 PROCEDURE — 87075 CULTR BACTERIA EXCEPT BLOOD: CPT

## 2020-08-26 PROCEDURE — 87186 SC STD MICRODIL/AGAR DIL: CPT

## 2020-08-26 PROCEDURE — 99214 OFFICE O/P EST MOD 30 MIN: CPT

## 2020-08-26 PROCEDURE — 87070 CULTURE OTHR SPECIMN AEROBIC: CPT

## 2020-08-26 PROCEDURE — 11042 DBRDMT SUBQ TIS 1ST 20SQCM/<: CPT

## 2020-08-26 RX ORDER — LIDOCAINE HYDROCHLORIDE 40 MG/ML
SOLUTION TOPICAL ONCE
Status: DISCONTINUED | OUTPATIENT
Start: 2020-08-26 | End: 2020-08-27 | Stop reason: HOSPADM

## 2020-08-26 NOTE — PROGRESS NOTES
Wound Healing Center Followup Visit Note    Referring Physician : Nohelia Norris Str RECORD NUMBER:  [id number]  AGE: 60 y.o. GENDER: male  : 1955  EPISODE DATE:  2020    Subjective:     Chief Complaint   Patient presents with    Wound Check      HISTORY of PRESENT ILLNESS HPI   Patrick Banks is a 60 y.o. male who presents today in regards to follow up evaluation and treatment of wound/ulcer. That patient's past medical, family and social hx were reviewed and changes were made if present. History of Wound Context:  Pt presents in regards to chronic R above knee amputation wound was since 2020. He had developed postoperatively dehiscence of his wound site which had previously undergone a muscle flap. He was having a packed per home health care. The wound had gotten so small that it was difficult to pack. He is still having drainage significant though. At first visit to wound healing center 20 his wound has not been improving. He was not on abx at initial visit to center.     Previous lower extremity procedures  2018  R AKA for chronic R LE wound   3/20/2020 R groin exploration with iliofemoral artery embolectomy, primary closure of arteriotomy, R deep femoral atery embolectomy   2020 Excisional debridement of infected necrotic  tissue, right groin and right thigh   2020 RIGHT THIGH WOUND DRESSING CHANGE and DEBRIDEMENT   2020 Irrigation and excisional debridement of Right Thigh above knee amputation wound   2020 Irrigation and excisional debridement of the Right thigh above knee amputation wound 25 x45 cm  Excision of prosthetic R LE bypass graft  Ligation of right SFA  Posterior hamstring myocutaneous flap 65 cm flap for open wound of 45 x 25 cm per Dr. Wes Sheldon     20  · Wound itself is small - opened up as difficult to pack  · 5 cm depth - culture done, significant amount of fluid expressed once opened up  · Optrace packing  · Discussed potential need for further surgical exploration in future - pt would like to avoid hospital and OR if possible    Wound/Ulcer Pain Timing/Severity: moderate  Quality of pain: aching, throbbing, shooting, tender  Severity:  5 / 10   Modifying Factors: Pain worsens with dressing changes, pressure, debridement  Associated Signs/Symptoms: drainage and pain    Ulcer Identification:  Ulcer Type: arterial, diabetic, pressure and non-healing surgical  Contributing Factors: edema, diabetes, poor glucose control, chronic pressure, decreased mobility, shear force, obesity and arterial insufficiency    Diabetic/Pressure/Non Pressure Ulcers only:  Ulcer: Diabetic ulcer, fat layer exposed    Wound: Wound dehiscence        PAST MEDICAL HISTORY      Diagnosis Date    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 ([address] 5/22/2018    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 ([address] 5/21/2018    Critical lower limb ischemia 3/20/2020    Diabetes mellitus ([address]     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 2 diabetes mellitus, with fat layer exposed ([address] 5/22/2018    Disruption of closure of muscle or muscle flap, sequela 8/26/2020    DVT, lower extremity (HCC)     right leg     Gas gangrene of thigh ([address] 3/20/2020    Hyperlipidemia     Hypertension     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[address]     PVD (peripheral vascular disease) ([address]      Past Surgical History:   Procedure Laterality Date    AMPUTATION ABOVE KNEE Right 4/28/2020    DEBRIDEMENT OF AMPUTATION RIGHT ABOVE KNEE performed by Rasheed Bautista MD at [facility] Right 4/30/2020    DEBRIDEMENT OF AMPUTATION RIGHT ABOVE KNEE,  POSS. ABOVE KNEE REVISION, POSS. CLOSURE, POSS.WOUND VAC -- DRS.  CASH / [address] performed by Rasheed Bautista MD [address] Right 3/20/2020    RIGHT LOWER EXTREMITY THROMBECTOMY, POSSIBLE ANGIOGRAM, POSSIBLE INTERVENTION, POSSIBLE BYPASS. performed by Curtis Iglesias MD at [address] Right 4/17/2020    RIGHT LEG DEBRIDEMENT INCISION AND DRAINAGE performed by Stacy Rust MD at [address] Right 4/20/2020    RIGHT THIGH WOUND DRESSING CHANGE; DEBRIDEMENT, removal of muscle performed by Stacy Rust MD at [address] Right 4/21/2020    RIGHT THIGH WOUND DRESSING CHANGE POSSIBLE  DEBRIDEMENT - NEEDS BEN Nix / JANETTE TO SEE performed by Giovanny Torres MD at [address] Right 4/23/2020    RIGHT THIGH WOUND DRESSING CHANGE and DEBRIDEMENT performed by Curtis Iglesias MD at [facility] Right 11/1/2018    AMPUTATION ABOVE KNEE RIGHT LEG performed by Curtis Iglesias MD at [facility] Right 5/24/2018    RIGHT FOOT INCISION AND DRAINAGE WITH PARTIAL BONE RESECTION performed by Mason Carbajal DPM at [address] [place] [address] OFFICE/OUTPT VISIT,PROCEDURE ONLY Right 8/3/2018    INCISION AND DRAINAGE MULTIPLE AREAS RIGHT FOOT WITH DEBRIDEMENT SOFT TISSUE performed by Mason Carbajal DPM at [facility] OR    RHINOPLASTY Right     leg      Family History   Problem Relation Age of Onset    Cancer Mother     Diabetes Father     Heart Failure Father     Hypertension Father     Cancer Sister      Social History     Tobacco Use    Smoking status: Current Every Day Smoker     Packs/day: 0.50     Years: 7.00     Pack years: 3.50     Types: Cigarettes    Smokeless tobacco: Never Used    Tobacco comment: 5-7 a day ,QUIT 3 MONTHS AGO   Substance Use Topics    Alcohol use: No     Frequency: Never    Drug use: No     No Known Allergies  Current Outpatient Medications on File Prior to Encounter   Medication Sig Dispense Refill    Lancets (ONETOUCH DELICA PLUS [id number]) MISC USE TO TEST BLOOD SUGARS 4 TIMES DAILY 200 each 5    oxyCODONE (ROXICODONE) 5 MG immediate release tablet Take 1 tablet by mouth every 6 hours as needed for Pain for up to 30 days. 120 tablet 0    oxyCODONE (OXYCONTIN) 10 MG extended release tablet Take 1 tablet by mouth every 12 hours for 30 days. 60 each 0    ULTICARE INSULIN SYRINGE 31G X 5/16\" 0.3 ML MISC USE TO INJECT INSULIN FIVE TIMES A  each 3    amLODIPine (NORVASC) 10 MG tablet       mirtazapine (REMERON) 15 MG tablet Take 1 tablet by mouth nightly 30 tablet 5    zolpidem (AMBIEN) 5 MG tablet Take 1 tablet by mouth nightly as needed for Sleep for up to 30 days. 30 tablet 3    ONETOUCH ULTRA strip USE TO TEST BLOOD SUGARS DAILY AS NEEDED 100 each 2    metoprolol tartrate (LOPRESSOR) 25 MG tablet Take 1 tablet by mouth 2 times daily 60 tablet 3    insulin lispro (HUMALOG) 100 UNIT/ML injection vial Inject 0-12 Units into the skin 3 times daily (with meals) 1 vial 3    bisacodyl (DULCOLAX) 5 MG EC tablet Take 1 tablet by mouth daily as needed for Constipation 30 tablet 0    cilostazol (PLETAL) 50 MG tablet Take 1 tablet by mouth 2 times daily 60 tablet 3    tiZANidine (ZANAFLEX) 2 MG tablet Take 1 tablet by mouth 3 times daily 90 tablet 0    ammonium lactate (LAC-HYDRIN) 12 % lotion Apply topically as needed. 1 Bottle 0    mineral oil-hydrophilic petrolatum (HYDROPHOR) ointment Apply topically as needed.  1 Tube 0    amLODIPine (NORVASC) 5 MG tablet Take 1 tablet by mouth daily 30 tablet 3    acetaminophen (TYLENOL) 325 MG tablet Take 650 mg by mouth every 4 hours as needed for Pain or Fever      docusate sodium (COLACE) 100 MG capsule Take 1 capsule by mouth 2 times daily 50 capsule 0    DULoxetine (CYMBALTA) 60 MG extended release capsule TAKE 2 CAPSULES BY MOUTH EVERY MORNING 60 capsule 3    cloNIDine (CATAPRES) 0.1 MG tablet TAKE 1 TABLET BY MOUTH 3 TIMES A DAY 60 tablet 3    Handicap Placard MISC by Does not apply route Patient cannot walk 200 ft without stopping to rest. Expiration 10/21/2024 1 each 0    LANCETS MICRO THIN 07V MISC 1 applicator by Does not apply route daily 100 each 1    aspirin 81 MG chewable tablet Take 1 tablet by mouth daily 30 tablet 3    spironolactone (ALDACTONE) 50 MG tablet Take 50 mg by mouth every morning       atorvastatin (LIPITOR) 10 MG tablet Take 10 mg by mouth daily       glucose (GLUTOSE) 40 % GEL Take 37.5 mLs by mouth as needed (hypoglycemia) 45 g 1    gabapentin (NEURONTIN) 400 MG capsule Take 1 capsule by mouth 3 times daily for 30 days. 90 capsule 3    naloxone 4 MG/0.1ML LIQD nasal spray 1 spray by Nasal route as needed for Opioid Reversal 1 each 5     No current facility-administered medications on file prior to encounter. REVIEW OF SYSTEMS See HPI    Objective: There were no vitals taken for this visit. Wt Readings from Last 3 Encounters:   08/17/20 240 lb (108.9 kg)   06/19/20 240 lb (108.9 kg)   04/16/20 (!) 335 lb (152 kg)     PHYSICAL EXAM  CONSTITUTIONAL:   Awake, alert, cooperative   EYES:  lids and lashes normal   ENT: external ears and nose without lesions   NECK:  supple, symmetrical, trachea midline   SKIN:  Open wound Present    Assessment:     Problem List Items Addressed This Visit     Disruption of closure of muscle or muscle flap, sequela (Chronic)    Ischemic ulcer of right thigh with fat layer exposed ([address] - Primary          Pre Debridement Measurements:  Are located in the [place]  Documentation Flow Sheet  Post Debridement Measurements:  Wound/Ulcer Descriptions are Pre Debridement except measurements:     Incision 11/01/18 Leg Right (Active)   Number of days: 663       Wound Buttocks Right;Upper; Inner (Active)   Number of days:        Wound 08/26/20 Hip Right;Lateral #1 right hip (Active)   Wound Image   08/26/20 1103   Wound Non-Healing Surgical 08/26/20 1103   Wound Length (cm) 0.2 cm 08/26/20 1103   Wound Width (cm) 0.4 cm 08/26/20 1103   Wound Depth (cm) 5 cm 08/26/20 1103   Wound Surface Area (cm^2) 0.08 cm^2 08/26/20 1103   Wound Volume (cm^3) 0.4 cm^3 08/26/20 1103   Post-Procedure Length (cm) 0.3 cm 08/26/20 1126   Post-Procedure Width (cm) 0.4 cm 08/26/20 1126   Post-Procedure Depth (cm) 5 cm 08/26/20 1126   Post-Procedure Surface Area (cm^2) 0.12 cm^2 08/26/20 1126   Post-Procedure Volume (cm^3) 0.6 cm^3 08/26/20 1126   Wound Assessment Other (Comment) 08/26/20 1103   Drainage Amount Copious 08/26/20 1103   Drainage Description Serosanguinous 08/26/20 1103   Odor None 08/26/20 1103   Nadia-wound Assessment Intact 08/26/20 1103   Non-staged Wound Description Full thickness 08/26/20 1103   Number of days: 0     Incision 04/28/20 Thigh Anterior;Right (Active)   Number of days: 119       Incision 04/28/20 Thigh Right;Medial (Active)   Number of days: 120       Procedure Note  Indications:  Based on my examination of this patient's wound(s)/ulcer(s) today, debridement is required to promote healing and evaluate the wound base. Performed by: Steve Pacheco MD    Consent obtained:  Yes    Time out taken:  Yes    Pain Control: Anesthetic  Anesthetic: 4% Lidocaine Liquid Topical     Debridement:Excisional Debridement    Using curette and #15 blade scalpel the wound(s)/ulcer(s) was/were sharply debrided down through and including the removal of epidermis, dermis and subcutaneous tissue. Devitalized Tissue Debrided:  fibrin, biofilm, slough and exudate to stimulate bleeding to promote healing, post debridement good bleeding base and wound edges noted    Wound/Ulcer #: 1    Percent of Wound/Ulcer Debrided: 100%    Total Surface Area Debrided:  0.12 sq cm     Estimated Blood Loss:  Minimal  Hemostasis Achieved:  by pressure    Procedural Pain:  6 / 10   Post Procedural Pain:  5 / 10     Response to treatment:  Well tolerated by patient. Plan:   Treatment Note please see attached Discharge Instructions    Written patient dismissal instructions given to patient and signed by patient or POA. Electronically signed by Tania Ngo MD on 8/26/2020 at 11:38 AM

## 2020-08-28 LAB
ANAEROBIC CULTURE: NORMAL
ORGANISM: ABNORMAL
WOUND/ABSCESS: ABNORMAL

## 2020-08-31 NOTE — CONSULTS
Vascular Surgery Consultation Note    Reason for Consult:  Gangrene Right Foot    HPI :    This is a 63 y.o. male who is admitted to the hospital for treatment of infected, chronic non-healing Right foot wound with osteomyelitis. Hx of Fem-Pop Bypass 2010 in [place]. Vascular surgery is consulted for evaluation and treatment of critical limb ischemia RLE. Seen by vascular service in the past. Also followed by podiatry, Dr. Es Olivier, and has underwent multiple incision and drainage of RLE wounds. Underwent angiography in May 2018 and was last seen for this wound on 7/2018 by Dr. Elkin Singh, recommended AKA at that time however the patient refused. Discharged 8/18 and put on IV Abx by Dr. Vincenzo Galeazzi for osteomyelitis. He was sent in today by Dr. Vincenzo Galeazzi and Dr. James Padilla for MRI and Bone Scan and for evaluation of surgical debridement and for new ulcerations noted to the R foot. Patient states he continues to smoke and his claudication symptoms have remained the same since last admission. Was on Coumadin for previous DVT, but no longer is on it.      ROS : Negative if blank [], Positive if [x]  General Vascular   [] Fevers [x] Claudication (Blocks)   [] Chills [x] Rest Pain   [] Weight Loss [x] Tissue Loss   [] Chest Pain [] Vision Changes   [] SOB at rest [] Slurred Speech   [x] SOB with exertion [] Focal Weakness   [] Nausea    [] Vomitting [] Stroke/TIA   [] Abdominal Pain [x] DVT   [] Melena    [] Hematochezia    [] Hematuria    [] Dysuria    [x] Leg Swelling    [] Wears Glasses/Contacts    [] Blindness       [] Difficulty swallowing         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 ([address] 5/22/2018    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 ([address] 5/21/2018    Diabetes mellitus ([address]     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 2 diabetes mellitus, with fat layer exposed ([address] 5/22/2018 Mid-Level Procedure Text (A): After obtaining clear surgical margins the patient was sent to a mid-level provider for surgical repair.  The patient understands they will receive post-surgical care and follow-up from the mid-level provider.

## 2020-09-02 ENCOUNTER — HOSPITAL ENCOUNTER (OUTPATIENT)
Dept: WOUND CARE | Age: 65
Discharge: HOME | End: 2020-09-02
Payer: COMMERCIAL

## 2020-09-02 VITALS
TEMPERATURE: 97.8 F | DIASTOLIC BLOOD PRESSURE: 70 MMHG | RESPIRATION RATE: 18 BRPM | HEART RATE: 77 BPM | BODY MASS INDEX: 30.8 KG/M2 | HEIGHT: 74 IN | SYSTOLIC BLOOD PRESSURE: 146 MMHG | WEIGHT: 240 LBS

## 2020-09-02 PROCEDURE — 11042 DBRDMT SUBQ TIS 1ST 20SQCM/<: CPT | Performed by: SURGERY

## 2020-09-02 PROCEDURE — 11042 DBRDMT SUBQ TIS 1ST 20SQCM/<: CPT

## 2020-09-02 RX ORDER — DOXYCYCLINE HYCLATE 100 MG
100 TABLET ORAL 2 TIMES DAILY
Qty: 20 TABLET | Refills: 0 | Status: SHIPPED | OUTPATIENT
Start: 2020-09-02 | End: 2020-09-12

## 2020-09-02 RX ORDER — LIDOCAINE HYDROCHLORIDE 40 MG/ML
SOLUTION TOPICAL ONCE
Status: DISCONTINUED | OUTPATIENT
Start: 2020-09-02 | End: 2020-09-03 | Stop reason: HOSPADM

## 2020-09-02 ASSESSMENT — PAIN SCALES - GENERAL: PAINLEVEL_OUTOF10: 3

## 2020-09-02 NOTE — PROGRESS NOTES
Wound Healing Center Followup Visit Note    Referring Physician : Nohelia Thorsunday [address] RECORD NUMBER:  [id number]  AGE: 60 y.o. GENDER: male  : 1955  EPISODE DATE:  2020    Subjective:     Chief Complaint   Patient presents with    Wound Check     rt stump      HISTORY of PRESENT ILLNESS RONNY Marcial is a 60 y.o. male who presents today in regards to follow up evaluation and treatment of wound/ulcer. That patient's past medical, family and social hx were reviewed and changes were made if present. History of Wound Context:  Pt presents in regards to chronic R above knee amputation wound was since 2020. He had developed postoperatively dehiscence of his wound site which had previously undergone a muscle flap. He was having a packed per home health care. The wound had gotten so small that it was difficult to pack. He is still having drainage significant though. At first visit to wound healing center 20 his wound has not been improving. He was not on abx at initial visit to center.     Previous lower extremity procedures  2018  R AKA for chronic R LE wound   3/20/2020 R groin exploration with iliofemoral artery embolectomy, primary closure of arteriotomy, R deep femoral atery embolectomy   2020 Excisional debridement of infected necrotic  tissue, right groin and right thigh   2020 RIGHT THIGH WOUND DRESSING CHANGE and DEBRIDEMENT   2020 Irrigation and excisional debridement of Right Thigh above knee amputation wound   2020 Irrigation and excisional debridement of the Right thigh above knee amputation wound 25 x45 cm  Excision of prosthetic R LE bypass graft  Ligation of right SFA  Posterior hamstring myocutaneous flap 65 cm flap for open wound of 45 x 25 cm per Dr. Yolande Osuna     20  · Wound itself is small - opened up as difficult to pack  · 5 cm depth - culture done, significant amount of fluid expressed once opened up  · aquacell ag packing  · Discussed potential need for further surgical exploration in future - pt would like to avoid hospital and OR if possible  9/2/20  · Wound much improved - depth less  · Start doxycyline for staph culture    Wound/Ulcer Pain Timing/Severity: mild  Quality of pain: aching, throbbing, shooting   Severity:  2 / 10   Modifying Factors: Pain worsens with dressing changes, pressure, debridement  Associated Signs/Symptoms: drainage and pain    Ulcer Identification:  Ulcer Type: arterial, diabetic, pressure and non-healing surgical  Contributing Factors: edema, diabetes, poor glucose control, chronic pressure, decreased mobility, shear force, obesity and arterial insufficiency    Diabetic/Pressure/Non Pressure Ulcers only:  Ulcer: Diabetic ulcer, fat layer exposed    Wound: Wound dehiscence        PAST MEDICAL HISTORY      Diagnosis Date    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 ([address] 5/22/2018    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 ([address] 5/21/2018    Critical lower limb ischemia 3/20/2020    Diabetes mellitus ([address]     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 2 diabetes mellitus, with fat layer exposed ([address] 5/22/2018    Disruption of closure of muscle or muscle flap, sequela 8/26/2020    DVT, lower extremity (HCC)     right leg     Gas gangrene of thigh ([address] 3/20/2020    Hyperlipidemia     Hypertension     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[address]     PVD (peripheral vascular disease) ([address]      Past Surgical History:   Procedure Laterality Date    AMPUTATION ABOVE KNEE Right 4/28/2020    DEBRIDEMENT OF AMPUTATION RIGHT ABOVE KNEE performed by Dipika Sanches MD at [address] [place] [address] Right 4/30/2020    DEBRIDEMENT OF AMPUTATION RIGHT ABOVE KNEE,  POSS. ABOVE KNEE REVISION, POSS. CLOSURE, POSS.WOUND VAC -- DRSClarisse  [address] [facility] / [address] [facility] performed by Dipika Sanches MD at [facility] OR    FEMORAL BYPASS      Right - [place], [address] Right 3/20/2020    RIGHT LOWER EXTREMITY THROMBECTOMY, POSSIBLE ANGIOGRAM, POSSIBLE INTERVENTION, POSSIBLE BYPASS. performed by Steve Pacheco MD at [address] Right 4/17/2020    RIGHT LEG DEBRIDEMENT INCISION AND DRAINAGE performed by Bev Olivia MD at [address] Right 4/20/2020    RIGHT THIGH WOUND DRESSING CHANGE; DEBRIDEMENT, removal of muscle performed by Bev Olivia MD at [address] Right 4/21/2020    RIGHT THIGH WOUND DRESSING CHANGE POSSIBLE  DEBRIDEMENT - NEEDS BEN Martini / JANETTE TO SEE performed by Jody Balderrama MD at [address] Right 4/23/2020    RIGHT THIGH WOUND DRESSING CHANGE and DEBRIDEMENT performed by Steve Pacheco MD at [address] [facility] Right 11/1/2018    AMPUTATION ABOVE KNEE RIGHT LEG performed by Steve Pacheco MD at [address] [facility] Right 5/24/2018    RIGHT FOOT INCISION AND DRAINAGE WITH PARTIAL BONE RESECTION performed by Marquis Villarreal DPM at [address] [place] [address] OFFICE/OUTPT VISIT,PROCEDURE ONLY Right 8/3/2018    INCISION AND DRAINAGE MULTIPLE AREAS RIGHT FOOT WITH DEBRIDEMENT SOFT TISSUE performed by Marquis Villarreal DPM at [facility] OR    RHINOPLASTY Right     leg      Family History   Problem Relation Age of Onset    Cancer Mother     Diabetes Father     Heart Failure Father     Hypertension Father     Cancer Sister      Social History     Tobacco Use    Smoking status: Current Every Day Smoker     Packs/day: 0.50     Years: 7.00     Pack years: 3.50     Types: Cigarettes    Smokeless tobacco: Never Used    Tobacco comment: 5-7 a day ,QUIT 3 MONTHS AGO   Substance Use Topics    Alcohol use: No     Frequency: Never    Drug use: No     No Known Allergies  Current Outpatient Medications on File Prior to Encounter   Medication Sig Dispense Refill    Lancets (ONETOUCH DELICA PLUS [id number]) MISC USE TO TEST BLOOD SUGARS 4 TIMES DAILY 200 each 5    oxyCODONE (ROXICODONE) 5 MG immediate release tablet Take 1 tablet by mouth every 6 hours as needed for Pain for up to 30 days. 120 tablet 0    oxyCODONE (OXYCONTIN) 10 MG extended release tablet Take 1 tablet by mouth every 12 hours for 30 days. 60 each 0    ULTICARE INSULIN SYRINGE 31G X 5/16\" 0.3 ML MISC USE TO INJECT INSULIN FIVE TIMES A  each 3    amLODIPine (NORVASC) 10 MG tablet       mirtazapine (REMERON) 15 MG tablet Take 1 tablet by mouth nightly 30 tablet 5    zolpidem (AMBIEN) 5 MG tablet Take 1 tablet by mouth nightly as needed for Sleep for up to 30 days. 30 tablet 3    ONETOUCH ULTRA strip USE TO TEST BLOOD SUGARS DAILY AS NEEDED 100 each 2    metoprolol tartrate (LOPRESSOR) 25 MG tablet Take 1 tablet by mouth 2 times daily 60 tablet 3    glucose (GLUTOSE) 40 % GEL Take 37.5 mLs by mouth as needed (hypoglycemia) 45 g 1    insulin lispro (HUMALOG) 100 UNIT/ML injection vial Inject 0-12 Units into the skin 3 times daily (with meals) 1 vial 3    bisacodyl (DULCOLAX) 5 MG EC tablet Take 1 tablet by mouth daily as needed for Constipation 30 tablet 0    cilostazol (PLETAL) 50 MG tablet Take 1 tablet by mouth 2 times daily 60 tablet 3    gabapentin (NEURONTIN) 400 MG capsule Take 1 capsule by mouth 3 times daily for 30 days. 90 capsule 3    tiZANidine (ZANAFLEX) 2 MG tablet Take 1 tablet by mouth 3 times daily 90 tablet 0    ammonium lactate (LAC-HYDRIN) 12 % lotion Apply topically as needed. 1 Bottle 0    mineral oil-hydrophilic petrolatum (HYDROPHOR) ointment Apply topically as needed.  1 Tube 0    amLODIPine (NORVASC) 5 MG tablet Take 1 tablet by mouth daily 30 tablet 3    naloxone 4 MG/0.1ML LIQD nasal spray 1 spray by Nasal route as needed for Opioid Reversal 1 each 5    acetaminophen (TYLENOL) 325 MG tablet Take 650 mg by mouth every 4 hours as needed for Pain or Fever      docusate sodium (COLACE) 100 MG capsule Take 1 capsule by mouth 2 times daily 50 capsule 0    DULoxetine (CYMBALTA) 60 MG extended release capsule TAKE 2 CAPSULES BY MOUTH EVERY MORNING 60 capsule 3    cloNIDine (CATAPRES) 0.1 MG tablet TAKE 1 TABLET BY MOUTH 3 TIMES A DAY 60 tablet 3    Handicap Placard MISC by Does not apply route Patient cannot walk 200 ft without stopping to rest.    Expiration 10/21/2024 1 each 0    LANCETS MICRO THIN 30S MISC 1 applicator by Does not apply route daily 100 each 1    aspirin 81 MG chewable tablet Take 1 tablet by mouth daily 30 tablet 3    spironolactone (ALDACTONE) 50 MG tablet Take 50 mg by mouth every morning       atorvastatin (LIPITOR) 10 MG tablet Take 10 mg by mouth daily        No current facility-administered medications on file prior to encounter. REVIEW OF SYSTEMS See HPI    Objective:    BP (!) 146/70   Pulse 77   Temp 97.8 °F (36.6 °C) (Temporal)   Resp 18   Ht 6' 2\" (1.88 m)   Wt 240 lb (108.9 kg)   BMI 30.81 kg/m²   Wt Readings from Last 3 Encounters:   09/02/20 240 lb (108.9 kg)   08/17/20 240 lb (108.9 kg)   06/19/20 240 lb (108.9 kg)     PHYSICAL EXAM  CONSTITUTIONAL:   Awake, alert, cooperative   EYES:  lids and lashes normal   ENT: external ears and nose without lesions   NECK:  supple, symmetrical, trachea midline   SKIN:  Open wound Present    Assessment:     Problem List Items Addressed This Visit     Disruption of closure of muscle or muscle flap, sequela (Chronic)    Ischemic ulcer of right thigh with fat layer exposed ([facility] - Primary          Pre Debridement Measurements:  Are located in the [place]  Documentation Flow Sheet  Post Debridement Measurements:  Wound/Ulcer Descriptions are Pre Debridement except measurements:     Incision 11/01/18 Leg Right (Active)   Number of days: 670       Wound Buttocks Right;Upper; Inner (Active)   Number of days:        Wound 08/26/20 Other (Comment) Right;Lateral #1 right AKA site (Active)   Wound Image 08/26/20 1103   Wound Non-Healing Surgical 08/26/20 1103   Dressing Changed Changed/New 09/02/20 1211   Dressing/Treatment ABD; Alginate with Ag 09/02/20 1211   Wound Cleansed Rinsed/Irrigated with saline 09/02/20 1211   Wound Length (cm) 1.2 cm 09/02/20 1145   Wound Width (cm) 0.4 cm 09/02/20 1145   Wound Depth (cm) 1.9 cm 09/02/20 1145   Wound Surface Area (cm^2) 0.48 cm^2 09/02/20 1145   Change in Wound Size % (l*w) -500 09/02/20 1145   Wound Volume (cm^3) 0.91 cm^3 09/02/20 1145   Wound Healing % -128 09/02/20 1145   Post-Procedure Length (cm) 1.3 cm 09/02/20 1206   Post-Procedure Width (cm) 0.6 cm 09/02/20 1206   Post-Procedure Depth (cm) 2.2 cm 09/02/20 1206   Post-Procedure Surface Area (cm^2) 0.78 cm^2 09/02/20 1206   Post-Procedure Volume (cm^3) 1.72 cm^3 09/02/20 1206   Wound Assessment Red 09/02/20 1145   Drainage Amount Large 09/02/20 1145   Drainage Description Serosanguinous 09/02/20 1145   Odor None 09/02/20 1145   Nadia-wound Assessment Intact 09/02/20 1145   Non-staged Wound Description Full thickness 08/26/20 1103   Number of days: 7     Incision 04/28/20 Thigh Anterior;Right (Active)   Number of days: 127       Incision 04/28/20 Thigh Right;Medial (Active)   Number of days: 127       Procedure Note  Indications:  Based on my examination of this patient's wound(s)/ulcer(s) today, debridement is required to promote healing and evaluate the wound base. Performed by: Steve Pacheco MD    Consent obtained:  Yes    Time out taken:  Yes    Pain Control: Anesthetic  Anesthetic: 4% Lidocaine Liquid Topical     Debridement:Excisional Debridement    Using curette and #15 blade scalpel the wound(s)/ulcer(s) was/were sharply debrided down through and including the removal of epidermis, dermis and subcutaneous tissue.         Devitalized Tissue Debrided:  fibrin, biofilm, slough and exudate to stimulate bleeding to promote healing, post debridement good bleeding base and wound edges noted    Wound/Ulcer #: 1    Percent of Wound/Ulcer Debrided: 100%    Total Surface Area Debrided:  0.48sq cm     Estimated Blood Loss:  Minimal  Hemostasis Achieved:  by pressure    Procedural Pain:  3  / 10   Post Procedural Pain:  2 / 10     Response to treatment:  Well tolerated by patient. Plan:   Treatment Note please see attached Discharge Instructions    Written patient dismissal instructions given to patient and signed by patient or POA. Discharge Instructions       Visit Discharge/Physician Orders     Discharge condition: Stable     Assessment of pain at discharge:yes     Anesthetic used: 4% lidocaine soln     Discharge to: Home     Left via:Private automobile     Accompanied by: marybeth HAYES/MARCOA: [facility]     Dressing Orders:RIGHT AKA SITE-Cleanse with normal saline, pack loosely with aquacel ag, dry dressing and secure. Change daily.     Treatment Orders:Eat a diet high in protein and vitamin C. Take a multiple vitamin daily unless contraindicated.     Start Doxycycline for 10 days as ordered     [address] [place] [address],[address] followup visit: 1week _____________________________  (Please note your next appointment above and if you are unable to keep, kindly give a 24 hour notice.  Thank you.)     Physician signature:__________________________        If you experience any of the following, please call the [address] [place] [address] during business hours:     * Increase in Pain  * Temperature over 101  * Increase in drainage from your wound  * Drainage with a foul odor  * Bleeding  * Increase in swelling  * Need for compression bandage changes due to slippage, breakthrough drainage.     If you need medical attention outside of the business hours of the [facility] [address] please contact your PCP or go to the nearest emergency room.                   Electronically signed by Laureen Porras MD on 9/2/2020 at 12:21 PM

## 2020-09-09 ENCOUNTER — HOSPITAL ENCOUNTER (OUTPATIENT)
Dept: WOUND CARE | Age: 65
Discharge: HOME | End: 2020-09-09
Payer: COMMERCIAL

## 2020-09-09 VITALS
TEMPERATURE: 98.4 F | SYSTOLIC BLOOD PRESSURE: 146 MMHG | HEART RATE: 72 BPM | RESPIRATION RATE: 18 BRPM | HEIGHT: 74 IN | DIASTOLIC BLOOD PRESSURE: 84 MMHG | WEIGHT: 240 LBS | BODY MASS INDEX: 30.8 KG/M2

## 2020-09-09 PROCEDURE — 11042 DBRDMT SUBQ TIS 1ST 20SQCM/<: CPT | Performed by: SURGERY

## 2020-09-09 RX ORDER — LIDOCAINE HYDROCHLORIDE 40 MG/ML
SOLUTION TOPICAL ONCE
Status: DISCONTINUED | OUTPATIENT
Start: 2020-09-09 | End: 2020-09-10 | Stop reason: HOSPADM

## 2020-09-09 ASSESSMENT — PAIN DESCRIPTION - LOCATION: LOCATION: LEG;HIP

## 2020-09-09 ASSESSMENT — PAIN DESCRIPTION - ORIENTATION: ORIENTATION: RIGHT

## 2020-09-09 ASSESSMENT — PAIN DESCRIPTION - PROGRESSION: CLINICAL_PROGRESSION: NOT CHANGED

## 2020-09-09 ASSESSMENT — PAIN - FUNCTIONAL ASSESSMENT: PAIN_FUNCTIONAL_ASSESSMENT: PREVENTS OR INTERFERES SOME ACTIVE ACTIVITIES AND ADLS

## 2020-09-09 ASSESSMENT — PAIN DESCRIPTION - PAIN TYPE: TYPE: CHRONIC PAIN

## 2020-09-09 ASSESSMENT — PAIN DESCRIPTION - DESCRIPTORS: DESCRIPTORS: ACHING

## 2020-09-09 ASSESSMENT — PAIN DESCRIPTION - FREQUENCY: FREQUENCY: INTERMITTENT

## 2020-09-09 ASSESSMENT — PAIN SCALES - GENERAL: PAINLEVEL_OUTOF10: 4

## 2020-09-09 ASSESSMENT — PAIN DESCRIPTION - ONSET: ONSET: ON-GOING

## 2020-09-09 NOTE — PROGRESS NOTES
Wound Healing Center Followup Visit Note    Referring Physician : Nohelia Norris Str RECORD NUMBER:  [id number]  AGE: 60 y.o. GENDER: male  : 1955  EPISODE DATE:  2020    Subjective:     Chief Complaint   Patient presents with    Wound Check     right amp site      HISTORY of PRESENT ILLNESS HPI   Deanna Kraft is a 60 y.o. male who presents today in regards to follow up evaluation and treatment of wound/ulcer. That patient's past medical, family and social hx were reviewed and changes were made if present. History of Wound Context:  Pt presents in regards to chronic R above knee amputation wound was since 2020. He had developed postoperatively dehiscence of his wound site which had previously undergone a muscle flap. He was having a packed per home health care. The wound had gotten so small that it was difficult to pack. He is still having drainage significant though. At first visit to wound healing center 20 his wound has not been improving. He was not on abx at initial visit to center.     Previous lower extremity procedures  2018  R AKA for chronic R LE wound   3/20/2020 R groin exploration with iliofemoral artery embolectomy, primary closure of arteriotomy, R deep femoral atery embolectomy   2020 Excisional debridement of infected necrotic  tissue, right groin and right thigh   2020 RIGHT THIGH WOUND DRESSING CHANGE and DEBRIDEMENT   2020 Irrigation and excisional debridement of Right Thigh above knee amputation wound   2020 Irrigation and excisional debridement of the Right thigh above knee amputation wound 25 x45 cm  Excision of prosthetic R LE bypass graft  Ligation of right SFA  Posterior hamstring myocutaneous flap 65 cm flap for open wound of 45 x 25 cm per Dr. Diony Muñiz     20  · Wound itself is small - opened up as difficult to pack  · 5 cm depth - culture done, significant amount of fluid expressed once opened up  · aquacell ag packing  · Discussed potential need for further surgical exploration in future - pt would like to avoid hospital and OR if possible  9/2/20  · Wound much improved - depth less  · Start doxycyline for staph culture  9/9/20  · Depth 5 cm again, fluid seems less    Wound/Ulcer Pain Timing/Severity: mild  Quality of pain: aching, throbbing, shooting   Severity:  2 / 10   Modifying Factors: Pain worsens with dressing changes, pressure, debridement  Associated Signs/Symptoms: drainage and pain    Ulcer Identification:  Ulcer Type: arterial, diabetic, pressure and non-healing surgical  Contributing Factors: edema, diabetes, poor glucose control, chronic pressure, decreased mobility, shear force, obesity and arterial insufficiency    Diabetic/Pressure/Non Pressure Ulcers only:  Ulcer: Diabetic ulcer, fat layer exposed    Wound: Wound dehiscence        PAST MEDICAL HISTORY      Diagnosis Date    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 ([address] 5/22/2018    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 ([address] 5/21/2018    Critical lower limb ischemia 3/20/2020    Diabetes mellitus ([address]     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 2 diabetes mellitus, with fat layer exposed ([address] 5/22/2018    Disruption of closure of muscle or muscle flap, sequela 8/26/2020    DVT, lower extremity (HCC)     right leg     Gas gangrene of thigh ([address] 3/20/2020    Hyperlipidemia     Hypertension     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[address]     PVD (peripheral vascular disease) ([address]      Past Surgical History:   Procedure Laterality Date    AMPUTATION ABOVE KNEE Right 4/28/2020    DEBRIDEMENT OF AMPUTATION RIGHT ABOVE KNEE performed by Nacho Palacios MD at [facility] Right 4/30/2020    DEBRIDEMENT OF AMPUTATION RIGHT ABOVE KNEE,  POSS. ABOVE KNEE REVISION, POSS. CLOSURE, POSS.WOUND VAC -- BEN Ray / [address] performed by Gautam Helms MD at [facility], [address] Right 3/20/2020    RIGHT LOWER EXTREMITY THROMBECTOMY, POSSIBLE ANGIOGRAM, POSSIBLE INTERVENTION, POSSIBLE BYPASS. performed by Gautam Helms MD at [address] Right 4/17/2020    RIGHT LEG DEBRIDEMENT INCISION AND DRAINAGE performed by Latisha Victoria MD at [address] Right 4/20/2020    RIGHT THIGH WOUND DRESSING CHANGE; DEBRIDEMENT, removal of muscle performed by Latisha Victoria MD at [address] Right 4/21/2020    RIGHT THIGH WOUND DRESSING CHANGE POSSIBLE  DEBRIDEMENT - NEEDS BEN Farias / JANETTE TO SEE performed by Ubaldo Kilgore MD at [address] Right 4/23/2020    RIGHT THIGH WOUND DRESSING CHANGE and DEBRIDEMENT performed by Gautam Helms MD at [address] [facility] Right 11/1/2018    AMPUTATION ABOVE KNEE RIGHT LEG performed by Gautam Helms MD at [facility] Right 5/24/2018    RIGHT FOOT INCISION AND DRAINAGE WITH PARTIAL BONE RESECTION performed by Preston Linares DPM at [address] [place] [address] OFFICE/OUTPT VISIT,PROCEDURE ONLY Right 8/3/2018    INCISION AND DRAINAGE MULTIPLE AREAS RIGHT FOOT WITH DEBRIDEMENT SOFT TISSUE performed by Preston Linares DPM at [facility] OR    RHINOPLASTY Right     leg      Family History   Problem Relation Age of Onset    Cancer Mother     Diabetes Father     Heart Failure Father     Hypertension Father     Cancer Sister      Social History     Tobacco Use    Smoking status: Current Every Day Smoker     Packs/day: 0.50     Years: 7.00     Pack years: 3.50     Types: Cigarettes    Smokeless tobacco: Never Used    Tobacco comment: 5-7 a day ,QUIT 3 MONTHS AGO   Substance Use Topics    Alcohol use: No     Frequency: Never    Drug use: No     No Known Allergies  Current Outpatient Medications on File Prior to Encounter Medication Sig Dispense Refill    doxycycline hyclate (VIBRA-TABS) 100 MG tablet Take 1 tablet by mouth 2 times daily for 10 days 20 tablet 0    oxyCODONE (ROXICODONE) 5 MG immediate release tablet Take 1 tablet by mouth every 6 hours as needed for Pain for up to 30 days. 120 tablet 0    oxyCODONE (OXYCONTIN) 10 MG extended release tablet Take 1 tablet by mouth every 12 hours for 30 days. 60 each 0    mirtazapine (REMERON) 15 MG tablet Take 1 tablet by mouth nightly 30 tablet 5    metoprolol tartrate (LOPRESSOR) 25 MG tablet Take 1 tablet by mouth 2 times daily 60 tablet 3    insulin lispro (HUMALOG) 100 UNIT/ML injection vial Inject 0-12 Units into the skin 3 times daily (with meals) 1 vial 3    cilostazol (PLETAL) 50 MG tablet Take 1 tablet by mouth 2 times daily 60 tablet 3    gabapentin (NEURONTIN) 400 MG capsule Take 1 capsule by mouth 3 times daily for 30 days. 90 capsule 3    ammonium lactate (LAC-HYDRIN) 12 % lotion Apply topically as needed. 1 Bottle 0    mineral oil-hydrophilic petrolatum (HYDROPHOR) ointment Apply topically as needed.  1 Tube 0    amLODIPine (NORVASC) 5 MG tablet Take 1 tablet by mouth daily 30 tablet 3    DULoxetine (CYMBALTA) 60 MG extended release capsule TAKE 2 CAPSULES BY MOUTH EVERY MORNING 60 capsule 3    cloNIDine (CATAPRES) 0.1 MG tablet TAKE 1 TABLET BY MOUTH 3 TIMES A DAY 60 tablet 3    aspirin 81 MG chewable tablet Take 1 tablet by mouth daily 30 tablet 3    spironolactone (ALDACTONE) 50 MG tablet Take 50 mg by mouth every morning       atorvastatin (LIPITOR) 10 MG tablet Take 10 mg by mouth daily       Lancets (ONETOUCH DELICA PLUS [id number]) MISC USE TO TEST BLOOD SUGARS 4 TIMES DAILY 200 each 5    ULTICARE INSULIN SYRINGE 31G X 5/16\" 0.3 ML MISC USE TO INJECT INSULIN FIVE TIMES A  each 3    ONETOUCH ULTRA strip USE TO TEST BLOOD SUGARS DAILY AS NEEDED 100 each 2    glucose (GLUTOSE) 40 % GEL Take 37.5 mLs by mouth as needed (hypoglycemia) 45 g 1  bisacodyl (DULCOLAX) 5 MG EC tablet Take 1 tablet by mouth daily as needed for Constipation 30 tablet 0    tiZANidine (ZANAFLEX) 2 MG tablet Take 1 tablet by mouth 3 times daily 90 tablet 0    naloxone 4 MG/0.1ML LIQD nasal spray 1 spray by Nasal route as needed for Opioid Reversal 1 each 5    acetaminophen (TYLENOL) 325 MG tablet Take 650 mg by mouth every 4 hours as needed for Pain or Fever      docusate sodium (COLACE) 100 MG capsule Take 1 capsule by mouth 2 times daily 50 capsule 0    Handicap Placard MISC by Does not apply route Patient cannot walk 200 ft without stopping to rest.    Expiration 10/21/2024 1 each 0    LANCETS MICRO THIN 66P MISC 1 applicator by Does not apply route daily 100 each 1     No current facility-administered medications on file prior to encounter. REVIEW OF SYSTEMS See HPI    Objective:    BP (!) 146/84   Pulse 72   Temp 98.4 °F (36.9 °C) (Temporal)   Resp 18   Ht 6' 2\" (1.88 m)   Wt 240 lb (108.9 kg)   BMI 30.81 kg/m²   Wt Readings from Last 3 Encounters:   09/09/20 240 lb (108.9 kg)   09/02/20 240 lb (108.9 kg)   08/17/20 240 lb (108.9 kg)     PHYSICAL EXAM  CONSTITUTIONAL:   Awake, alert, cooperative   EYES:  lids and lashes normal   ENT: external ears and nose without lesions   NECK:  supple, symmetrical, trachea midline   SKIN:  Open wound Present    Assessment:     Problem List Items Addressed This Visit     Disruption of closure of muscle or muscle flap, sequela (Chronic)    Ischemic ulcer of right thigh with fat layer exposed ([facility]) - Primary          Pre Debridement Measurements:  Are located in the [facility]  Documentation Flow Sheet  Post Debridement Measurements:  Wound/Ulcer Descriptions are Pre Debridement except measurements:     Incision 11/01/18 Leg Right (Active)   Number of days: 677       Wound Buttocks Right;Upper; Inner (Active)   Number of days:        Wound 08/26/20 Other (Comment) Right;Lateral #1 right AKA site (Active)   Wound Image 08/26/20 1103   Wound Non-Healing Surgical 08/26/20 1103   Dressing Changed Changed/New 09/02/20 1211   Dressing/Treatment ABD; Alginate with Ag 09/02/20 1211   Wound Cleansed Rinsed/Irrigated with saline 09/02/20 1211   Wound Length (cm) 1 cm 09/09/20 1118   Wound Width (cm) 0.3 cm 09/09/20 1118   Wound Depth (cm) 2.4 cm 09/09/20 1118   Wound Surface Area (cm^2) 0.3 cm^2 09/09/20 1118   Change in Wound Size % (l*w) -275 09/09/20 1118   Wound Volume (cm^3) 0.72 cm^3 09/09/20 1118   Wound Healing % -80 09/09/20 1118   Post-Procedure Length (cm) 1 cm 09/09/20 1157   Post-Procedure Width (cm) 0.4 cm 09/09/20 1157   Post-Procedure Depth (cm) 5 cm 09/09/20 1157   Post-Procedure Surface Area (cm^2) 0.4 cm^2 09/09/20 1157   Post-Procedure Volume (cm^3) 2 cm^3 09/09/20 1157   Wound Assessment [place] 09/09/20 1118   Drainage Amount [place] 09/09/20 1118   Drainage Description Sanguinous 09/09/20 1118   Odor None 09/09/20 1118   Nadia-wound Assessment Intact 09/09/20 1118   Non-staged Wound Description Full thickness 08/26/20 1103   Number of days: 14     Incision 04/28/20 Thigh Anterior;Right (Active)   Number of days: 134       Incision 04/28/20 Thigh Right;Medial (Active)   Number of days: 134       Procedure Note  Indications:  Based on my examination of this patient's wound(s)/ulcer(s) today, debridement is required to promote healing and evaluate the wound base. Performed by: Curtis Iglesias MD    Consent obtained:  Yes    Time out taken:  Yes    Pain Control: Anesthetic  Anesthetic: 4% Lidocaine Liquid Topical     Debridement:Excisional Debridement    Using curette the wound(s)/ulcer(s) was/were sharply debrided down through and including the removal of epidermis, dermis and subcutaneous tissue.         Devitalized Tissue Debrided:  fibrin, biofilm, slough and exudate to stimulate bleeding to promote healing, post debridement good bleeding base and wound edges noted    Wound/Ulcer #: 1    Percent of Wound/Ulcer

## 2020-09-14 ENCOUNTER — TELEPHONE (OUTPATIENT)
Dept: PRIMARY CARE CLINIC | Age: 65
End: 2020-09-14

## 2020-09-14 NOTE — TELEPHONE ENCOUNTER
Received call from Sergei at [facility]. She is requesting a new order for pt to receive a bariatric Rolator due to the changes in is ambulation. The order will need to state that there have been changes so the insurance will pay for a new ne.     Fax# [phone]

## 2020-09-15 RX ORDER — DULOXETIN HYDROCHLORIDE 60 MG/1
120 CAPSULE, DELAYED RELEASE ORAL EVERY MORNING
Qty: 60 CAPSULE | Refills: 3 | Status: SHIPPED | OUTPATIENT
Start: 2020-09-15 | End: 2020-12-04 | Stop reason: SDUPTHER

## 2020-09-15 RX ORDER — AMLODIPINE BESYLATE 10 MG/1
TABLET ORAL
Qty: 30 TABLET | Refills: 3 | Status: SHIPPED | OUTPATIENT
Start: 2020-09-15 | End: 2020-12-04 | Stop reason: SDUPTHER

## 2020-09-16 ENCOUNTER — HOSPITAL ENCOUNTER (OUTPATIENT)
Dept: WOUND CARE | Age: 65
Discharge: HOME | End: 2020-09-16
Payer: COMMERCIAL

## 2020-09-17 RX ORDER — OXYCODONE HCL 10 MG/1
10 TABLET, FILM COATED, EXTENDED RELEASE ORAL EVERY 12 HOURS SCHEDULED
Qty: 60 EACH | Refills: 0 | Status: SHIPPED
Start: 2020-09-17 | End: 2020-10-15 | Stop reason: SDUPTHER

## 2020-09-17 RX ORDER — OXYCODONE HYDROCHLORIDE 5 MG/1
5 TABLET ORAL EVERY 6 HOURS PRN
Qty: 120 TABLET | Refills: 0 | Status: SHIPPED
Start: 2020-09-17 | End: 2020-10-13

## 2020-09-18 RX ORDER — CLONIDINE HYDROCHLORIDE 0.1 MG/1
TABLET ORAL
Qty: 60 TABLET | Refills: 3 | Status: SHIPPED | OUTPATIENT
Start: 2020-09-18 | End: 2020-11-10 | Stop reason: SDUPTHER

## 2020-09-23 ENCOUNTER — HOSPITAL ENCOUNTER (OUTPATIENT)
Dept: WOUND CARE | Age: 65
Discharge: HOME | End: 2020-09-23
Payer: COMMERCIAL

## 2020-09-23 VITALS — TEMPERATURE: 97.8 F | DIASTOLIC BLOOD PRESSURE: 76 MMHG | SYSTOLIC BLOOD PRESSURE: 140 MMHG | HEART RATE: 80 BPM

## 2020-09-23 PROCEDURE — 11042 DBRDMT SUBQ TIS 1ST 20SQCM/<: CPT

## 2020-09-23 PROCEDURE — 11042 DBRDMT SUBQ TIS 1ST 20SQCM/<: CPT | Performed by: SURGERY

## 2020-09-23 RX ORDER — LIDOCAINE HYDROCHLORIDE 40 MG/ML
SOLUTION TOPICAL ONCE
Status: DISCONTINUED | OUTPATIENT
Start: 2020-09-23 | End: 2020-09-24 | Stop reason: HOSPADM

## 2020-09-23 ASSESSMENT — PAIN DESCRIPTION - PAIN TYPE: TYPE: CHRONIC PAIN

## 2020-09-23 ASSESSMENT — PAIN DESCRIPTION - DESCRIPTORS: DESCRIPTORS: STABBING

## 2020-09-23 ASSESSMENT — PAIN SCALES - GENERAL: PAINLEVEL_OUTOF10: 5

## 2020-09-23 ASSESSMENT — PAIN DESCRIPTION - LOCATION: LOCATION: LEG

## 2020-09-23 ASSESSMENT — PAIN DESCRIPTION - ORIENTATION: ORIENTATION: RIGHT

## 2020-09-24 NOTE — PROGRESS NOTES
Wound Healing Center Followup Visit Note    Referring Physician : Nohelia Norris Str RECORD NUMBER:  [id number]  AGE: 60 y.o. GENDER: male  : 1955  EPISODE DATE:  2020    Subjective:     Chief Complaint   Patient presents with    Wound Check     right stump      HISTORY of PRESENT ILLNESS RONNY Grace is a 60 y.o. male who presents today in regards to follow up evaluation and treatment of wound/ulcer. That patient's past medical, family and social hx were reviewed and changes were made if present. History of Wound Context:  Pt presents in regards to chronic R above knee amputation wound was since 2020. He had developed postoperatively dehiscence of his wound site which had previously undergone a muscle flap. He was having a packed per home health care. The wound had gotten so small that it was difficult to pack. He is still having drainage significant though. At first visit to wound healing center 20 his wound has not been improving. He was not on abx at initial visit to center.     Previous lower extremity procedures  2018  R AKA for chronic R LE wound   3/20/2020 R groin exploration with iliofemoral artery embolectomy, primary closure of arteriotomy, R deep femoral atery embolectomy   2020 Excisional debridement of infected necrotic  tissue, right groin and right thigh   2020 RIGHT THIGH WOUND DRESSING CHANGE and DEBRIDEMENT   2020 Irrigation and excisional debridement of Right Thigh above knee amputation wound   2020 Irrigation and excisional debridement of the Right thigh above knee amputation wound 25 x45 cm  Excision of prosthetic R LE bypass graft  Ligation of right SFA  Posterior hamstring myocutaneous flap 65 cm flap for open wound of 45 x 25 cm per Dr. Osman Burleson     20  · Wound itself is small - opened up as difficult to pack  · 5 cm depth - culture done, significant amount of fluid expressed once opened up  · aquacell ag packing  · Discussed potential need for further surgical exploration in future - pt would like to avoid hospital and OR if possible  9/2/20  · Wound much improved - depth less  · Start doxycyline for staph culture  9/9/20  · Depth 5 cm again, fluid seems less  9/23/20  · Tunneling now 8 cm, concern that hittig bone  · Discussed with patient continuing current reigmen unlikely to result in healing  · Would benefit from further surgical debridement, likely revision of amputation site and possible wound vac  · Patient would like to consider options and will let me know next week    Wound/Ulcer Pain Timing/Severity: mild  Quality of pain: aching, throbbing, shooting   Severity:  2 / 10   Modifying Factors: Pain worsens with dressing changes, pressure, debridement  Associated Signs/Symptoms: drainage and pain    Ulcer Identification:  Ulcer Type: arterial, diabetic, pressure and non-healing surgical  Contributing Factors: edema, diabetes, poor glucose control, chronic pressure, decreased mobility, shear force, obesity and arterial insufficiency    Diabetic/Pressure/Non Pressure Ulcers only:  Ulcer: Diabetic ulcer, fat layer exposed    Wound: Wound dehiscence        PAST MEDICAL HISTORY      Diagnosis Date    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 ([address] 5/22/2018    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 ([address] 5/21/2018    Critical lower limb ischemia 3/20/2020    Diabetes mellitus ([address]     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 2 diabetes mellitus, with fat layer exposed ([address] 5/22/2018    Disruption of closure of muscle or muscle flap, sequela 8/26/2020    DVT, lower extremity (HCC)     right leg     Gas gangrene of thigh ([address] 3/20/2020    Hyperlipidemia     Hypertension     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[address]     PVD (peripheral vascular disease) ([address]      Past Surgical History:   Procedure Laterality Date Packs/day: 0.50     Years: 7.00     Pack years: 3.50     Types: Cigarettes    Smokeless tobacco: Never Used    Tobacco comment: 5-7 a day ,QUIT 3 MONTHS AGO   Substance Use Topics    Alcohol use: No     Frequency: Never    Drug use: No     No Known Allergies  Current Outpatient Medications on File Prior to Encounter   Medication Sig Dispense Refill    cloNIDine (CATAPRES) 0.1 MG tablet TAKE ONE TABLET BY MOUTH THREE TIMES DAILY 60 tablet 3    oxyCODONE (ROXICODONE) 5 MG immediate release tablet Take 1 tablet by mouth every 6 hours as needed for Pain for up to 30 days. 120 tablet 0    oxyCODONE (OXYCONTIN) 10 MG extended release tablet Take 1 tablet by mouth every 12 hours for 30 days. 60 each 0    amLODIPine (NORVASC) 10 MG tablet TAKE ONE TABLET BY MOUTH EVERY MORNING--HOLD IF SYSTOLIC BLOOD PRESSURE IS LESS THAN 140. 30 tablet 3    DULoxetine (CYMBALTA) 60 MG extended release capsule TAKE 2 CAPSULES BY MOUTH EVERY MORNING 60 capsule 3    mirtazapine (REMERON) 15 MG tablet Take 1 tablet by mouth nightly 30 tablet 5    metoprolol tartrate (LOPRESSOR) 25 MG tablet Take 1 tablet by mouth 2 times daily 60 tablet 3    insulin lispro (HUMALOG) 100 UNIT/ML injection vial Inject 0-12 Units into the skin 3 times daily (with meals) 1 vial 3    bisacodyl (DULCOLAX) 5 MG EC tablet Take 1 tablet by mouth daily as needed for Constipation 30 tablet 0    cilostazol (PLETAL) 50 MG tablet Take 1 tablet by mouth 2 times daily 60 tablet 3    tiZANidine (ZANAFLEX) 2 MG tablet Take 1 tablet by mouth 3 times daily 90 tablet 0    ammonium lactate (LAC-HYDRIN) 12 % lotion Apply topically as needed. 1 Bottle 0    mineral oil-hydrophilic petrolatum (HYDROPHOR) ointment Apply topically as needed.  1 Tube 0    amLODIPine (NORVASC) 5 MG tablet Take 1 tablet by mouth daily 30 tablet 3    docusate sodium (COLACE) 100 MG capsule Take 1 capsule by mouth 2 times daily 50 capsule 0    aspirin 81 MG chewable tablet Take 1 tablet by mouth daily 30 tablet 3    spironolactone (ALDACTONE) 50 MG tablet Take 50 mg by mouth every morning       atorvastatin (LIPITOR) 10 MG tablet Take 10 mg by mouth daily       Lancets (ONETOUCH DELICA PLUS [id number]) MISC USE TO TEST BLOOD SUGARS 4 TIMES DAILY 200 each 5    ULTICARE INSULIN SYRINGE 31G X 5/16\" 0.3 ML MISC USE TO INJECT INSULIN FIVE TIMES A  each 3    ONETOUCH ULTRA strip USE TO TEST BLOOD SUGARS DAILY AS NEEDED 100 each 2    glucose (GLUTOSE) 40 % GEL Take 37.5 mLs by mouth as needed (hypoglycemia) 45 g 1    gabapentin (NEURONTIN) 400 MG capsule Take 1 capsule by mouth 3 times daily for 30 days. 90 capsule 3    naloxone 4 MG/0.1ML LIQD nasal spray 1 spray by Nasal route as needed for Opioid Reversal 1 each 5    acetaminophen (TYLENOL) 325 MG tablet Take 650 mg by mouth every 4 hours as needed for Pain or Fever      Handicap Placard MISC by Does not apply route Patient cannot walk 200 ft without stopping to rest.    Expiration 10/21/2024 1 each 0    LANCETS MICRO THIN 89R MISC 1 applicator by Does not apply route daily 100 each 1     No current facility-administered medications on file prior to encounter.         REVIEW OF SYSTEMS See HPI    Objective:    BP (!) 140/76   Pulse 80   Temp 97.8 °F (36.6 °C)   Wt Readings from Last 3 Encounters:   09/09/20 240 lb (108.9 kg)   09/02/20 240 lb (108.9 kg)   08/17/20 240 lb (108.9 kg)     PHYSICAL EXAM  CONSTITUTIONAL:   Awake, alert, cooperative   EYES:  lids and lashes normal   ENT: external ears and nose without lesions   NECK:  supple, symmetrical, trachea midline   SKIN:  Open wound Present    Assessment:     Problem List Items Addressed This Visit     Disruption of closure of muscle or muscle flap, sequela - Primary (Chronic)    Ischemic ulcer of right thigh with fat layer exposed ([address]          Pre Debridement Measurements:  Are located in the [facility]  Documentation Flow Sheet  Post Debridement Measurements:  Wound/Ulcer Descriptions are Pre Debridement except measurements:     Incision 11/01/18 Leg Right (Active)   Number of days: 692       Wound Buttocks Right;Upper; Inner (Active)   Number of days:        Wound 08/26/20 Other (Comment) Right;Lateral #1 right AKA site (Active)   Wound Image   08/26/20 1103   Wound Non-Healing Surgical 08/26/20 1103   Dressing Changed Changed/New 09/23/20 1210   Dressing/Treatment Alginate with Ag;Dry dressing 09/23/20 1210   Wound Cleansed Rinsed/Irrigated with saline 09/23/20 1210   Wound Length (cm) 0.4 cm 09/23/20 1139   Wound Width (cm) 0.4 cm 09/23/20 1139   Wound Depth (cm) 3.2 cm 09/23/20 1139   Wound Surface Area (cm^2) 0.16 cm^2 09/23/20 1139   Change in Wound Size % (l*w) -100 09/23/20 1139   Wound Volume (cm^3) 0.51 cm^3 09/23/20 1139   Wound Healing % -28 09/23/20 1139   Post-Procedure Length (cm) 0.5 cm 09/23/20 1157   Post-Procedure Width (cm) 0.5 cm 09/23/20 1157   Post-Procedure Depth (cm) 3.3 cm 09/23/20 1157   Post-Procedure Surface Area (cm^2) 0.25 cm^2 09/23/20 1157   Post-Procedure Volume (cm^3) 0.82 cm^3 09/23/20 1157   Distance Tunneling (cm) 8 cm 09/23/20 1157   Tunneling Position ___ O'Clock 1100 09/23/20 1157   Wound Assessment [place] 09/23/20 1157   Drainage Amount Large 09/23/20 1139   Drainage Description Yellow 09/23/20 1139   Odor None 09/23/20 1139   Nadia-wound Assessment Maceration 09/23/20 1139   Non-staged Wound Description Full thickness 08/26/20 1103   Number of days: 28     Incision 04/28/20 Thigh Anterior;Right (Active)   Number of days: 148       Incision 04/28/20 Thigh Right;Medial (Active)   Number of days: 148       Procedure Note  Indications:  Based on my examination of this patient's wound(s)/ulcer(s) today, debridement is required to promote healing and evaluate the wound base.     Performed by: Erwin Parada MD    Consent obtained:  Yes    Time out taken:  Yes    Pain Control: Anesthetic  Anesthetic: 4% Lidocaine Liquid Topical changes due to slippage, breakthrough drainage.     If you need medical attention outside of the business hours of the [address] [place] [address] Road please contact your PCP or go to the nearest emergency room.                              Electronically signed by [place] MD on 9/23/2020 at 10:53 PM

## 2020-09-25 ENCOUNTER — TELEPHONE (OUTPATIENT)
Dept: ADMINISTRATIVE | Age: 65
End: 2020-09-25

## 2020-09-25 NOTE — TELEPHONE ENCOUNTER
Message in preservice inbox:  Pt having difficulty ambulating, had a revision & amputation, requesting script for Bariatric Rollator so it can be covered by insurance.   Call Brian Hernandez if any questions or fax script to [phone]

## 2020-09-30 ENCOUNTER — HOSPITAL ENCOUNTER (OUTPATIENT)
Dept: WOUND CARE | Age: 65
Discharge: HOME | End: 2020-09-30
Payer: COMMERCIAL

## 2020-09-30 VITALS
BODY MASS INDEX: 30.8 KG/M2 | TEMPERATURE: 99.2 F | SYSTOLIC BLOOD PRESSURE: 144 MMHG | HEIGHT: 74 IN | RESPIRATION RATE: 18 BRPM | WEIGHT: 240 LBS | HEART RATE: 84 BPM | DIASTOLIC BLOOD PRESSURE: 70 MMHG

## 2020-09-30 PROCEDURE — 11042 DBRDMT SUBQ TIS 1ST 20SQCM/<: CPT

## 2020-09-30 PROCEDURE — 11042 DBRDMT SUBQ TIS 1ST 20SQCM/<: CPT | Performed by: SURGERY

## 2020-09-30 RX ORDER — LIDOCAINE HYDROCHLORIDE 40 MG/ML
SOLUTION TOPICAL ONCE
Status: DISCONTINUED | OUTPATIENT
Start: 2020-09-30 | End: 2020-10-01 | Stop reason: HOSPADM

## 2020-09-30 ASSESSMENT — PAIN SCALES - GENERAL: PAINLEVEL_OUTOF10: 2

## 2020-09-30 ASSESSMENT — PAIN DESCRIPTION - DESCRIPTORS: DESCRIPTORS: ACHING

## 2020-09-30 ASSESSMENT — PAIN DESCRIPTION - FREQUENCY: FREQUENCY: INTERMITTENT

## 2020-09-30 ASSESSMENT — PAIN DESCRIPTION - ORIENTATION: ORIENTATION: RIGHT

## 2020-09-30 ASSESSMENT — PAIN DESCRIPTION - ONSET: ONSET: ON-GOING

## 2020-09-30 ASSESSMENT — PAIN DESCRIPTION - LOCATION: LOCATION: LEG

## 2020-09-30 ASSESSMENT — PAIN DESCRIPTION - PAIN TYPE: TYPE: CHRONIC PAIN

## 2020-09-30 ASSESSMENT — PAIN DESCRIPTION - PROGRESSION: CLINICAL_PROGRESSION: NOT CHANGED

## 2020-09-30 NOTE — PROGRESS NOTES
[facility]aling Center Followup Visit Note    Referring Physician : Nohelia Norris Str RECORD NUMBER:  [id number]  AGE: 60 y.o. GENDER: male  : 1955  EPISODE DATE:  2020    Subjective:     Chief Complaint   Patient presents with    Wound Check     right leg amp site      HISTORY of PRESENT ILLNESS HPI   Nikolas Calhoun is a 60 y.o. male who presents today in regards to follow up evaluation and treatment of wound/ulcer. That patient's past medical, family and social hx were reviewed and changes were made if present. History of Wound Context:  Pt presents in regards to chronic R above knee amputation wound was since 2020. He had developed postoperatively dehiscence of his wound site which had previously undergone a muscle flap. He was having a packed per home health care. The wound had gotten so small that it was difficult to pack. He is still having drainage significant though. At first visit to wound healing center 20 his wound has not been improving. He was not on abx at initial visit to center.     Previous lower extremity procedures  2018  R AKA for chronic R LE wound   3/20/2020 R groin exploration with iliofemoral artery embolectomy, primary closure of arteriotomy, R deep femoral atery embolectomy   2020 Excisional debridement of infected necrotic  tissue, right groin and right thigh   2020 RIGHT THIGH WOUND DRESSING CHANGE and DEBRIDEMENT   2020 Irrigation and excisional debridement of Right Thigh above knee amputation wound   2020 Irrigation and excisional debridement of the Right thigh above knee amputation wound 25 x45 cm  Excision of prosthetic R LE bypass graft  Ligation of right SFA  Posterior hamstring myocutaneous flap 65 cm flap for open wound of 45 x 25 cm per Dr. Matthias Stinson     20  · Wound itself is small - opened up as difficult to pack  · 5 cm depth - culture done, significant amount of fluid expressed once opened up  · aquacell ag packing  · Discussed potential need for further surgical exploration in future - pt would like to avoid hospital and OR if possible  9/2/20  · Wound much improved - depth less  · Start doxycyline for staph culture  9/9/20  · Depth 5 cm again, fluid seems less  9/23/20  · Tunneling now 8 cm, concern that hittig bone  · Discussed with patient continuing current reigmen unlikely to result in healing  · Would benefit from further surgical debridement, likely revision of amputation site and possible wound vac  · Patient would like to consider options and will let me know next week  9/30/20  · Significant tunneling still  · Will proceed with or in 2 weeks per pt request    Wound/Ulcer Pain Timing/Severity: mild  Quality of pain: aching, throbbing, shooting   Severity:  2 / 10   Modifying Factors: Pain worsens with dressing changes, pressure, debridement  Associated Signs/Symptoms: drainage and pain    Ulcer Identification:  Ulcer Type: arterial, diabetic, pressure and non-healing surgical  Contributing Factors: edema, diabetes, poor glucose control, chronic pressure, decreased mobility, shear force, obesity and arterial insufficiency    Diabetic/Pressure/Non Pressure Ulcers only:  Ulcer: Diabetic ulcer, fat layer exposed    Wound: Wound dehiscence        PAST MEDICAL HISTORY      Diagnosis Date    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 ([address] 5/22/2018    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 ([address] 5/21/2018    Critical lower limb ischemia 3/20/2020    Diabetes mellitus ([address]     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 2 diabetes mellitus, with fat layer exposed ([address] 5/22/2018    Disruption of closure of muscle or muscle flap, sequela 8/26/2020    DVT, lower extremity (HCC)     right leg     Gas gangrene of thigh ([address] 3/20/2020    Hyperlipidemia     Hypertension     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[address]     PVD (peripheral vascular disease) ([facility]      Past Surgical History:   Procedure Laterality Date    AMPUTATION ABOVE KNEE Right 4/28/2020    DEBRIDEMENT OF AMPUTATION RIGHT ABOVE KNEE performed by Leonard Canada MD at [facility] Right 4/30/2020    DEBRIDEMENT OF AMPUTATION RIGHT ABOVE KNEE,  POSS. ABOVE KNEE REVISION, POSS. CLOSURE, POSS.WOUND VAC -- BEN Daigle / Brianne Marcano TO LOOK IN performed by Leonard Canada MD at [place] [address] Right 3/20/2020    RIGHT LOWER EXTREMITY THROMBECTOMY, POSSIBLE ANGIOGRAM, POSSIBLE INTERVENTION, POSSIBLE BYPASS. performed by Leonard Canada MD at [address] Right 4/17/2020    RIGHT LEG DEBRIDEMENT INCISION AND DRAINAGE performed by Juan Leyden, MD at [address] [place] [address] Right 4/20/2020    RIGHT THIGH WOUND DRESSING CHANGE; DEBRIDEMENT, removal of muscle performed by Juan Leyden, MD at [address] Right 4/21/2020    RIGHT THIGH WOUND DRESSING CHANGE POSSIBLE  DEBRIDEMENT - NEEDS BEN Daigle / Jessica Jenkins TO SEE performed by Cecil Garcia MD at [address] Right 4/23/2020    RIGHT THIGH WOUND DRESSING CHANGE and DEBRIDEMENT performed by Leonard Canada MD at [address] [place] [address] Right 11/1/2018    AMPUTATION ABOVE KNEE RIGHT LEG performed by Leonard Canada MD at [address] [place] [address] Right 5/24/2018    RIGHT FOOT INCISION AND DRAINAGE WITH PARTIAL BONE RESECTION performed by Natasha Reed DPM at [address] [place] [address] OFFICE/OUTPT VISIT,PROCEDURE ONLY Right 8/3/2018    INCISION AND DRAINAGE MULTIPLE AREAS RIGHT FOOT WITH DEBRIDEMENT SOFT TISSUE performed by Natasha Reed DPM at [place] [address] Right     leg      Family History   Problem Relation Age of Onset    Cancer Mother     Diabetes Father     Heart Failure Father     Hypertension Father    Burch Cancer Sister      Social History     Tobacco Use    Smoking status: Current Every Day Smoker     Packs/day: 0.50     Years: 7.00     Pack years: 3.50     Types: Cigarettes    Smokeless tobacco: Never Used    Tobacco comment: 5-7 a day ,QUIT 3 MONTHS AGO   Substance Use Topics    Alcohol use: No     Frequency: Never    Drug use: No     No Known Allergies  Current Outpatient Medications on File Prior to Encounter   Medication Sig Dispense Refill    cloNIDine (CATAPRES) 0.1 MG tablet TAKE ONE TABLET BY MOUTH THREE TIMES DAILY 60 tablet 3    oxyCODONE (ROXICODONE) 5 MG immediate release tablet Take 1 tablet by mouth every 6 hours as needed for Pain for up to 30 days. 120 tablet 0    oxyCODONE (OXYCONTIN) 10 MG extended release tablet Take 1 tablet by mouth every 12 hours for 30 days.  60 each 0    amLODIPine (NORVASC) 10 MG tablet TAKE ONE TABLET BY MOUTH EVERY MORNING--HOLD IF SYSTOLIC BLOOD PRESSURE IS LESS THAN 140. 30 tablet 3    DULoxetine (CYMBALTA) 60 MG extended release capsule TAKE 2 CAPSULES BY MOUTH EVERY MORNING 60 capsule 3    Lancets (ONETOUCH DELICA PLUS [id number]) MISC USE TO TEST BLOOD SUGARS 4 TIMES DAILY 200 each 5    ULTICARE INSULIN SYRINGE 31G X 5/16\" 0.3 ML MISC USE TO INJECT INSULIN FIVE TIMES A  each 3    mirtazapine (REMERON) 15 MG tablet Take 1 tablet by mouth nightly 30 tablet 5    ONETOUCH ULTRA strip USE TO TEST BLOOD SUGARS DAILY AS NEEDED 100 each 2    metoprolol tartrate (LOPRESSOR) 25 MG tablet Take 1 tablet by mouth 2 times daily 60 tablet 3    insulin lispro (HUMALOG) 100 UNIT/ML injection vial Inject 0-12 Units into the skin 3 times daily (with meals) 1 vial 3    bisacodyl (DULCOLAX) 5 MG EC tablet Take 1 tablet by mouth daily as needed for Constipation 30 tablet 0    cilostazol (PLETAL) 50 MG tablet Take 1 tablet by mouth 2 times daily 60 tablet 3    tiZANidine (ZANAFLEX) 2 MG tablet Take 1 tablet by mouth 3 times daily 90 tablet 0    ammonium lactate with fat layer exposed ([facility] - Primary          Pre Debridement Measurements:  Are located in the [facility]  Documentation Flow Sheet  Post Debridement Measurements:  Wound/Ulcer Descriptions are Pre Debridement except measurements:     Incision 11/01/18 Leg Right (Active)   Number of days: 698       Wound Buttocks Right;Upper; Inner (Active)   Number of days:        Wound 08/26/20 Other (Comment) Right;Lateral #1 right AKA site (Active)   Wound Image   09/30/20 1152   Wound Non-Healing Surgical 08/26/20 1103   Dressing Changed Changed/New 09/30/20 1224   Dressing/Treatment Alginate with Ag;Dry dressing 09/30/20 1224   Wound Cleansed Rinsed/Irrigated with saline 09/30/20 1224   Wound Length (cm) 0.4 cm 09/30/20 1152   Wound Width (cm) 0.3 cm 09/30/20 1152   Wound Depth (cm) 0.5 cm 09/30/20 1152   Wound Surface Area (cm^2) 0.12 cm^2 09/30/20 1152   Change in Wound Size % (l*w) -50 09/30/20 1152   Wound Volume (cm^3) 0.06 cm^3 09/30/20 1152   Wound Healing % 85 09/30/20 1152   Post-Procedure Length (cm) 0.4 cm 09/30/20 1212   Post-Procedure Width (cm) 0.4 cm 09/30/20 1212   Post-Procedure Depth (cm) 6 cm 09/30/20 1212   Post-Procedure Surface Area (cm^2) 0.16 cm^2 09/30/20 1212   Post-Procedure Volume (cm^3) 0.96 cm^3 09/30/20 1212   Distance Tunneling (cm) 6 cm 09/30/20 1212   Tunneling Position ___ O'Clock 3 09/30/20 1212   Wound Assessment Pink;Yellow 09/30/20 1152   Drainage Amount Large 09/30/20 1152   Drainage Description Serosanguinous; Yellow 09/30/20 1152   Odor None 09/30/20 1152   Nadia-wound Assessment Maceration 09/30/20 1152   Non-staged Wound Description Full thickness 08/26/20 1103   Number of days: 35     Incision 04/28/20 Thigh Anterior;Right (Active)   Number of days: 155       Incision 04/28/20 Thigh Right;Medial (Active)   Number of days: 155       Procedure Note  Indications:  Based on my examination of this patient's wound(s)/ulcer(s) today, debridement is required to promote healing and evaluate the wound base. Performed by: Cuco Ortega MD    Consent obtained:  Yes    Time out taken:  Yes    Pain Control: Anesthetic  Anesthetic: 4% Lidocaine Liquid Topical     Debridement:Excisional Debridement    Using curette the wound(s)/ulcer(s) was/were sharply debrided down through and including the removal of epidermis, dermis and subcutaneous tissue. Devitalized Tissue Debrided:  fibrin, biofilm, slough and exudate to stimulate bleeding to promote healing, post debridement good bleeding base and wound edges noted    Wound/Ulcer #: 1    Percent of Wound/Ulcer Debrided: 100%    Total Surface Area Debrided:  0.12 sq cm     Estimated Blood Loss:  Minimal  Hemostasis Achieved:  by pressure    Procedural Pain:  3  / 10   Post Procedural Pain:  2 / 10     Response to treatment:  Well tolerated by patient. Plan:   Treatment Note please see attached Discharge Instructions    Written patient dismissal instructions given to patient and signed by patient or POA. Discharge Instructions       Visit Discharge/Physician Orders     Discharge condition: Stable     Assessment of pain at discharge:yes     Anesthetic used: 4% lidocaine soln     Discharge to: Home     Left via:Private automobile     Accompanied by: marybeth HAYES/MARCOA: [facility]depth 5cm and  pack track cm @ 3:00)     Dressing Orders:RIGHT AKA SITE-Cleanse with normal saline, pack loosely with aquacel ag, dry dressing and secure. Change daily.     Treatment Orders:Eat a diet high in protein and vitamin C. Take a multiple vitamin daily unless contraindicated.     Excisional debridement in surgery in 2 weeks-Dr KAMINSKI's office will call     [facility] followup visit: 3 weeks (surgery in 2 weeks) _____________________________  (Please note your next appointment above and if you are unable to keep, kindly give a 24 hour notice.  Thank you.)     Physician signature:__________________________        If you experience any of the following, please call the [facility] during business hours:     * Increase in Pain  * Temperature over 101  * Increase in drainage from your wound  * Drainage with a foul odor  * Bleeding  * Increase in swelling  * Need for compression bandage changes due to slippage, breakthrough drainage.     If you need medical attention outside of the business hours of the [facility] please contact your PCP or go to the nearest emergency room.                               Electronically signed by Jose Eduardo Newton MD on 9/30/2020 at 3:29 PM

## 2020-10-01 ENCOUNTER — TELEPHONE (OUTPATIENT)
Dept: VASCULAR SURGERY | Age: 65
End: 2020-10-01

## 2020-10-12 ENCOUNTER — OFFICE VISIT (OUTPATIENT)
Dept: PRIMARY CARE CLINIC | Age: 65
End: 2020-10-12
Payer: COMMERCIAL

## 2020-10-12 VITALS
HEART RATE: 74 BPM | RESPIRATION RATE: 16 BRPM | BODY MASS INDEX: 30.81 KG/M2 | DIASTOLIC BLOOD PRESSURE: 62 MMHG | OXYGEN SATURATION: 98 % | TEMPERATURE: 97.3 F | HEIGHT: 74 IN | SYSTOLIC BLOOD PRESSURE: 130 MMHG

## 2020-10-12 PROCEDURE — G8482 FLU IMMUNIZE ORDER/ADMIN: HCPCS | Performed by: FAMILY MEDICINE

## 2020-10-12 PROCEDURE — G8427 DOCREV CUR MEDS BY ELIG CLIN: HCPCS | Performed by: FAMILY MEDICINE

## 2020-10-12 PROCEDURE — G0008 ADMIN INFLUENZA VIRUS VAC: HCPCS | Performed by: FAMILY MEDICINE

## 2020-10-12 PROCEDURE — 2022F DILAT RTA XM EVC RTNOPTHY: CPT | Performed by: FAMILY MEDICINE

## 2020-10-12 PROCEDURE — 90686 IIV4 VACC NO PRSV 0.5 ML IM: CPT | Performed by: FAMILY MEDICINE

## 2020-10-12 PROCEDURE — 99213 OFFICE O/P EST LOW 20 MIN: CPT | Performed by: FAMILY MEDICINE

## 2020-10-12 PROCEDURE — G8417 CALC BMI ABV UP PARAM F/U: HCPCS | Performed by: FAMILY MEDICINE

## 2020-10-12 PROCEDURE — 4004F PT TOBACCO SCREEN RCVD TLK: CPT | Performed by: FAMILY MEDICINE

## 2020-10-12 PROCEDURE — 3044F HG A1C LEVEL LT 7.0%: CPT | Performed by: FAMILY MEDICINE

## 2020-10-12 PROCEDURE — 3017F COLORECTAL CA SCREEN DOC REV: CPT | Performed by: FAMILY MEDICINE

## 2020-10-12 ASSESSMENT — ENCOUNTER SYMPTOMS
BACK PAIN: 0
SHORTNESS OF BREATH: 0
DIARRHEA: 0
PHOTOPHOBIA: 0
BLOOD IN STOOL: 0
CONSTIPATION: 1

## 2020-10-12 NOTE — PROGRESS NOTES
10/12/2020     Sweta Dean (:  1955) is a 60 y.o. male, here for evaluation of the following medical concerns:    HPI  Patient is here to follow-up on chronic issues and for medication refills. Patient states he has been doing well in regards to his pain and diabetes. A1c today is 6.5. He is unfortunately scheduled to go back into the hospital for revision surgery of his right AKA on the  of this month. Current plan is to keep the patient for 7 to 14 days based on clinical response and healing. Patient denies any other issues currently. Review of Systems   Constitutional: Positive for fatigue. HENT: Negative for hearing loss and nosebleeds. Eyes: Negative for photophobia. Respiratory: Negative for shortness of breath. Cardiovascular: Negative for palpitations and leg swelling. Gastrointestinal: Positive for constipation. Negative for blood in stool and diarrhea. Endocrine: Negative for polydipsia. Genitourinary: Negative for dysuria, frequency, hematuria and urgency. Musculoskeletal: Positive for arthralgias, gait problem, joint swelling and myalgias. Negative for back pain. Right Sided above-the-knee amputation revised multiple times until it is  just distal to the hip joint   Skin: Negative. Neurological: Positive for numbness. Negative for dizziness and tremors. Hematological: Does not bruise/bleed easily. Psychiatric/Behavioral: Positive for decreased concentration, dysphoric mood and sleep disturbance. Negative for hallucinations, self-injury and suicidal ideas. The patient is nervous/anxious. All other systems reviewed and are negative. Prior to Visit Medications    Medication Sig Taking? Authorizing Provider   cloNIDine (CATAPRES) 0.1 MG tablet TAKE ONE TABLET BY MOUTH THREE TIMES DAILY Yes Juanito Kline,    oxyCODONE (ROXICODONE) 5 MG immediate release tablet Take 1 tablet by mouth every 6 hours as needed for Pain for up to 30 days.  Yes Juanito Kline, DO   oxyCODONE (OXYCONTIN) 10 MG extended release tablet Take 1 tablet by mouth every 12 hours for 30 days. Yes Juanito Kline, DO   amLODIPine (NORVASC) 10 MG tablet TAKE ONE TABLET BY MOUTH EVERY MORNING--HOLD IF SYSTOLIC BLOOD PRESSURE IS LESS THAN 140. Yes Juanito Kline, DO   DULoxetine (CYMBALTA) 60 MG extended release capsule TAKE 2 CAPSULES BY MOUTH EVERY MORNING Yes Juanito Kline, DO   Lancets (ONETOUCH DELICA PLUS [id number]) MISC USE TO TEST BLOOD SUGARS 4 TIMES DAILY Yes Juanito Kline, DO   ULTICARE INSULIN SYRINGE 31G X 5/16\" 0.3 ML MISC USE TO INJECT INSULIN FIVE TIMES A DAY Yes Juanito Kline, DO   mirtazapine (REMERON) 15 MG tablet Take 1 tablet by mouth nightly Yes Juanito Kline, DO   ONETOUCH ULTRA strip USE TO TEST BLOOD SUGARS DAILY AS NEEDED Yes Juanito Kline, DO   metoprolol tartrate (LOPRESSOR) 25 MG tablet Take 1 tablet by mouth 2 times daily Yes Zo Davila MD   glucose (GLUTOSE) 40 % GEL Take 37.5 mLs by mouth as needed (hypoglycemia) Yes Eddie Gomez MD   insulin lispro (HUMALOG) 100 UNIT/ML injection vial Inject 0-12 Units into the skin 3 times daily (with meals) Yes Eddie Gomez MD   bisacodyl (DULCOLAX) 5 MG EC tablet Take 1 tablet by mouth daily as needed for Constipation Yes Zo Davila MD   cilostazol (PLETAL) 50 MG tablet Take 1 tablet by mouth 2 times daily Yes Zo Davila MD   gabapentin (NEURONTIN) 400 MG capsule Take 1 capsule by mouth 3 times daily for 30 days. Yes Zo Davila MD   tiZANidine (ZANAFLEX) 2 MG tablet Take 1 tablet by mouth 3 times daily Yes Zo Davila MD   ammonium lactate (LAC-HYDRIN) 12 % lotion Apply topically as needed. Yes Zo Davila MD   mineral oil-hydrophilic petrolatum (HYDROPHOR) ointment Apply topically as needed.  Yes Zo Davila MD   amLODIPine (NORVASC) 5 MG tablet Take 1 tablet by mouth daily Yes Madai Mcfadden MD   naloxone 4 MG/0.1ML LIQD nasal spray 1 spray by Nasal route as needed for Opioid Reversal Yes Yao Friedman MD   acetaminophen (TYLENOL) 325 MG tablet Take 650 mg by mouth every 4 hours as needed for Pain or Fever Yes Historical Provider, MD   docusate sodium (COLACE) 100 MG capsule Take 1 capsule by mouth 2 times daily Yes Quinn Harrington MD   Handicap Placard MISC by Does not apply route Patient cannot walk 200 ft without stopping to rest.    Expiration 10/21/2024 Yes Juanito Kline DO   [address] 1 applicator by Does not apply route daily Yes Juanito Kline DO   spironolactone (ALDACTONE) 50 MG tablet Take 50 mg by mouth every morning  Yes Historical Provider, MD   atorvastatin (LIPITOR) 10 MG tablet Take 10 mg by mouth daily  Yes Historical Provider, MD   aspirin 81 MG chewable tablet Take 1 tablet by mouth daily  Patient not taking: Reported on 10/12/2020  Ileana Campbell MD        Social History     Tobacco Use    Smoking status: Current Every Day Smoker     Packs/day: 0.50     Years: 7.00     Pack years: 3.50     Types: Cigarettes    Smokeless tobacco: Never Used    Tobacco comment: 5-7 a day ,QUIT 3 MONTHS AGO   Substance Use Topics    Alcohol use: No     Frequency: Never        Vitals:    10/12/20 1058   BP: 130/62   Pulse: 74   Resp: 16   Temp: 97.3 °F (36.3 °C)   SpO2: 98%   Weight: Comment: unable to obtain   Height: 6' 2\" (1.88 m)     Estimated body mass index is 30.81 kg/m² as calculated from the following:    Height as of this encounter: 6' 2\" (1.88 m). Weight as of 9/30/20: 240 lb (108.9 kg). Physical Exam  HENT:      Head: Normocephalic and atraumatic. Mouth/Throat:      Dentition: Abnormal dentition (edentulous upper). Eyes:      General: No scleral icterus. Conjunctiva/sclera: Conjunctivae normal.      Pupils: Pupils are equal, round, and reactive to light. Neck:      Musculoskeletal: Neck supple. Thyroid: No thyromegaly. Cardiovascular:      Rate and Rhythm: Normal rate and regular rhythm.       Heart sounds: Normal heart sounds. No murmur. Pulmonary:      Effort: Pulmonary effort is normal.      Breath sounds: Normal breath sounds. No rales. Abdominal:      General: Bowel sounds are decreased. There is no distension. Palpations: Abdomen is soft. Tenderness: There is no abdominal tenderness. Musculoskeletal: Normal range of motion. Legs:    Lymphadenopathy:      Cervical: No cervical adenopathy. Skin:     General: Skin is warm and dry. Findings: No erythema or rash. Neurological:      Mental Status: He is alert and oriented to person, place, and time. Cranial Nerves: No cranial nerve deficit. Psychiatric:         Judgment: Judgment normal.         Assessment/Plan:   Diagnosis Orders   1. Controlled type 2 diabetes mellitus with other specified complication, with long-term current use of insulin ([address]     2. Need for influenza vaccination  INFLUENZA, QUADV, 3 YRS AND OLDER, IM PF, PREFILL SYR OR SDV, 0.5ML (AFLURIA QUADV, PF)   3. Above knee amputation of right lower extremity ([address]     4. PVD (peripheral vascular disease) ([address]       Patient currently stable in regards to his chronic issues. A1c is under good control hopefully this will help with the healing for the upcoming surgery. See him back in 2 months or sooner based on clinical response during surgery and postoperatively. Influenza given today. Tanya Ortiz D.O.   11:49 AM  10/12/2020       This document may have been prepared at least partially through the use of voice recognition software. Although effort is taken to assure the accuracy of this document, it is possible that grammatical, syntax,  or spelling errors may occur.

## 2020-10-13 NOTE — PROGRESS NOTES
Monique 36 PRE-ADMISSION TESTING GENERAL INSTRUCTIONS- [facility]-phone number:[phone]    GENERAL INSTRUCTIONS  [x] Antibacterial Soap shower Night before and/or AM of Surgery  [] Corey wipe instruction sheet and wipes given. [x] Nothing by mouth after midnight, including gum, candy, mints, or water. [x] You may brush your teeth, gargle, but do NOT swallow water. []Hibiclens shower  the night before and the morning of surgery. Do not use             Hibiclens on your face or head. [x]No smoking, chewing tobacco, illegal drugs, or alcohol within 24 hours of your surgery. [x] Jewelry, valuables or body piercing's should not be brought to the hospital. All body and/or tongue piercing's must be removed prior to arriving to hospital.  ALL hair pins must be removed. [x] Do not wear makeup, lotions, powders, deodorant. Nail polish as directed by the nurse. [x] Arrange transportation with a responsible adult  to and from the hospital. If you do not have a responsible adult  to transport you, you will need to make arrangements with a medical transportation company (i.e. [employer]. A Uber/taxi/bus is not appropriate unless you are accompanied by a responsible adult ). Arrange for someone to be with you for the remainder of the day and for 24 hours after your procedure due to having had anesthesia. Who will be your  for transportation?_____BILLY_____________   Who will be staying with you for 24 hrs after your procedure?_________FAMILY_________  [x] Bring insurance card and photo ID.  [] Transfusion Bracelet: Please bring with you to hospital, day of surgery  [] Bring urine specimen day of surgery. Any small container is acceptable. [] Use inhalers the morning of surgery and bring with you to hospital.  [] Bring copy of living will or healthcare power of  papers to be placed in your electronic record.   [] CPAP/BI-PAP: Please bring your machine if you are to spend the night in the hospital.     PARKING INSTRUCTIONS:   [x] Arrival Time:________0730  10/15/2020  Vianca Platt ENTRANCE_____  · [] Parking lot '\"I\"  is located on [facility] (the corner of [facility] and [facility]). To enter, press the button and the gate will lift. A free token will be provided to exit the lot. One car per patient is allowed to park in this lot. All other cars are to park on [address] [place] [address] Street either in the parking garage or the handicap lot. [x] To reach the [facility] lobby from [address] [place] [address], upon entering the hospital, take elevator B to the 3rd floor. EDUCATION INSTRUCTIONS:      [] Knee or hip replacement booklet & exercise pamphlets given. [] Alden 77 placed in chart. [] Pre-admission Testing educational folder given  [] Incentive Spirometry,coughing & deep breathing exercises reviewed. []Medication information sheet(s)   []Fluoroscopy-Xray used in surgery reviewed with patient. Educational pamphlet placed in chart. []Pain: Post-op pain is normal and to be expected. You will be asked to rate your pain from 0-10(a zero is not acceptable-education is needed). Your post-op pain goal is:  [] Ask your nurse for your pain medication. [] Joint camp offered. [] Joint replacement booklets given. [] Other:___________________________    MEDICATION INSTRUCTIONS:   [x]Bring a complete list of your medications, please write the last time you took the medicine, give this list to the nurse. [x] Take the following medications the morning of surgery with 1-2 ounces of water:   [] Stop herbal supplements and vitamins 5 days before your surgery. [x] DO NOT take any diabetic medicine the morning of surgery. Follow instructions for insulin the day before surgery. [x] If you are diabetic and your blood sugar is low or you feel symptomatic, you may drink 1-2 ounces of apple juice or take a glucose tablet.   The morning of your procedure, you may call the pre-op area if you have concerns about your blood sugar [phone]. [] Use your inhalers the morning of surgery. Bring your emergency inhaler with you day of surgery. [] Follow physician instructions regarding any blood thinners you may be taking. WHAT TO EXPECT:  [] The day of surgery you will be greeted and checked in by the [facility].  In addition, you will be registered in the [place] by a Patient Access Representative. Please bring your photo ID and insurance card. A nurse will greet you in accordance to the time you are needed in the pre-op area to prepare you for surgery. Please do not be discouraged if you are not greeted in the order you arrive as there are many variables that are involved in patient preparation. Your patience is greatly appreciated as you wait for your nurse. Please bring in items such as: books, magazines, newspapers, electronics, or any other items  to occupy your time in the waiting area. [x]  Delays may occur with surgery and staff will make a sincere effort to keep you informed of delays. If any delays occur with your procedure, we apologize ahead of time for your inconvenience as we recognize the value of your time.

## 2020-10-14 ENCOUNTER — ANESTHESIA EVENT (OUTPATIENT)
Dept: OPERATING ROOM | Age: 65
End: 2020-10-14
Payer: COMMERCIAL

## 2020-10-15 ENCOUNTER — ANESTHESIA (OUTPATIENT)
Dept: OPERATING ROOM | Age: 65
End: 2020-10-15
Payer: COMMERCIAL

## 2020-10-15 ENCOUNTER — HOSPITAL ENCOUNTER (OUTPATIENT)
Age: 65
Setting detail: OUTPATIENT SURGERY
Discharge: HOME | End: 2020-10-15
Attending: SURGERY | Admitting: SURGERY
Payer: COMMERCIAL

## 2020-10-15 VITALS
RESPIRATION RATE: 16 BRPM | HEART RATE: 68 BPM | DIASTOLIC BLOOD PRESSURE: 89 MMHG | TEMPERATURE: 98.1 F | HEIGHT: 74 IN | BODY MASS INDEX: 30.8 KG/M2 | OXYGEN SATURATION: 97 % | WEIGHT: 240 LBS | SYSTOLIC BLOOD PRESSURE: 155 MMHG

## 2020-10-15 VITALS — OXYGEN SATURATION: 99 % | TEMPERATURE: 96.3 F | DIASTOLIC BLOOD PRESSURE: 122 MMHG | SYSTOLIC BLOOD PRESSURE: 255 MMHG

## 2020-10-15 PROBLEM — L97.112 ULCERATION OF STUMP OF ABOVE KNEE AMPUTATION OF RIGHT LOWER EXTREMITY WITH FAT LAYER EXPOSED (HCC): Status: ACTIVE | Noted: 2020-10-15

## 2020-10-15 PROBLEM — T87.89 ULCERATION OF STUMP OF ABOVE KNEE AMPUTATION OF RIGHT LOWER EXTREMITY WITH FAT LAYER EXPOSED (HCC): Status: ACTIVE | Noted: 2020-10-15

## 2020-10-15 LAB
HCT VFR BLD CALC: 35.2 % (ref 37–54)
HEMOGLOBIN: 11.3 G/DL (ref 12.5–16.5)
MCH RBC QN AUTO: 24.9 PG (ref 26–35)
MCHC RBC AUTO-ENTMCNC: 32.1 % (ref 32–34.5)
MCV RBC AUTO: 77.7 FL (ref 80–99.9)
METER GLUCOSE: 127 MG/DL (ref 74–99)
PDW BLD-RTO: 15 FL (ref 11.5–15)
PLATELET # BLD: 261 E9/L (ref 130–450)
PMV BLD AUTO: 10.3 FL (ref 7–12)
RBC # BLD: 4.53 E12/L (ref 3.8–5.8)
WBC # BLD: 8.5 E9/L (ref 4.5–11.5)

## 2020-10-15 PROCEDURE — 7100000010 HC PHASE II RECOVERY - FIRST 15 MIN: Performed by: SURGERY

## 2020-10-15 PROCEDURE — 7100000001 HC PACU RECOVERY - ADDTL 15 MIN: Performed by: SURGERY

## 2020-10-15 PROCEDURE — 88305 TISSUE EXAM BY PATHOLOGIST: CPT

## 2020-10-15 PROCEDURE — 87186 SC STD MICRODIL/AGAR DIL: CPT

## 2020-10-15 PROCEDURE — 2580000003 HC RX 258: Performed by: SURGERY

## 2020-10-15 PROCEDURE — 3600000012 HC SURGERY LEVEL 2 ADDTL 15MIN: Performed by: SURGERY

## 2020-10-15 PROCEDURE — 6360000002 HC RX W HCPCS: Performed by: ANESTHESIOLOGY

## 2020-10-15 PROCEDURE — 36415 COLL VENOUS BLD VENIPUNCTURE: CPT

## 2020-10-15 PROCEDURE — 7100000011 HC PHASE II RECOVERY - ADDTL 15 MIN: Performed by: SURGERY

## 2020-10-15 PROCEDURE — 3700000000 HC ANESTHESIA ATTENDED CARE: Performed by: SURGERY

## 2020-10-15 PROCEDURE — 11046 DBRDMT MUSC&/FSCA EA ADDL: CPT | Performed by: SURGERY

## 2020-10-15 PROCEDURE — 87075 CULTR BACTERIA EXCEPT BLOOD: CPT

## 2020-10-15 PROCEDURE — 3700000001 HC ADD 15 MINUTES (ANESTHESIA): Performed by: SURGERY

## 2020-10-15 PROCEDURE — 2500000003 HC RX 250 WO HCPCS: Performed by: SURGERY

## 2020-10-15 PROCEDURE — 6370000000 HC RX 637 (ALT 250 FOR IP): Performed by: SURGERY

## 2020-10-15 PROCEDURE — 3600000002 HC SURGERY LEVEL 2 BASE: Performed by: SURGERY

## 2020-10-15 PROCEDURE — 82962 GLUCOSE BLOOD TEST: CPT

## 2020-10-15 PROCEDURE — 7100000000 HC PACU RECOVERY - FIRST 15 MIN: Performed by: SURGERY

## 2020-10-15 PROCEDURE — 87076 CULTURE ANAEROBE IDENT EACH: CPT

## 2020-10-15 PROCEDURE — 85027 COMPLETE CBC AUTOMATED: CPT

## 2020-10-15 PROCEDURE — 2500000003 HC RX 250 WO HCPCS: Performed by: NURSE ANESTHETIST, CERTIFIED REGISTERED

## 2020-10-15 PROCEDURE — 87070 CULTURE OTHR SPECIMN AEROBIC: CPT

## 2020-10-15 PROCEDURE — 11043 DBRDMT MUSC&/FSCA 1ST 20/<: CPT | Performed by: SURGERY

## 2020-10-15 PROCEDURE — 87205 SMEAR GRAM STAIN: CPT

## 2020-10-15 PROCEDURE — 6360000002 HC RX W HCPCS: Performed by: NURSE ANESTHETIST, CERTIFIED REGISTERED

## 2020-10-15 RX ORDER — SODIUM CHLORIDE 0.9 % (FLUSH) 0.9 %
10 SYRINGE (ML) INJECTION PRN
Status: CANCELLED | OUTPATIENT
Start: 2020-10-15

## 2020-10-15 RX ORDER — OXYCODONE HCL 10 MG/1
10 TABLET, FILM COATED, EXTENDED RELEASE ORAL EVERY 12 HOURS SCHEDULED
Qty: 60 EACH | Refills: 0 | Status: SHIPPED | OUTPATIENT
Start: 2020-10-15 | End: 2020-11-10 | Stop reason: SDUPTHER

## 2020-10-15 RX ORDER — NALOXONE HYDROCHLORIDE 4 MG/.1ML
1 SPRAY NASAL PRN
Status: CANCELLED | OUTPATIENT
Start: 2020-10-15

## 2020-10-15 RX ORDER — ONDANSETRON 2 MG/ML
INJECTION INTRAMUSCULAR; INTRAVENOUS PRN
Status: DISCONTINUED | OUTPATIENT
Start: 2020-10-15 | End: 2020-10-15 | Stop reason: SDUPTHER

## 2020-10-15 RX ORDER — OXYCODONE HCL 10 MG/1
10 TABLET, FILM COATED, EXTENDED RELEASE ORAL EVERY 12 HOURS SCHEDULED
Status: CANCELLED | OUTPATIENT
Start: 2020-10-15

## 2020-10-15 RX ORDER — SODIUM CHLORIDE 0.9 % (FLUSH) 0.9 %
10 SYRINGE (ML) INJECTION EVERY 12 HOURS SCHEDULED
Status: CANCELLED | OUTPATIENT
Start: 2020-10-15

## 2020-10-15 RX ORDER — ROCURONIUM BROMIDE 10 MG/ML
INJECTION, SOLUTION INTRAVENOUS PRN
Status: DISCONTINUED | OUTPATIENT
Start: 2020-10-15 | End: 2020-10-15 | Stop reason: SDUPTHER

## 2020-10-15 RX ORDER — MORPHINE SULFATE 2 MG/ML
1 INJECTION, SOLUTION INTRAMUSCULAR; INTRAVENOUS EVERY 5 MIN PRN
Status: DISCONTINUED | OUTPATIENT
Start: 2020-10-15 | End: 2020-10-15 | Stop reason: HOSPADM

## 2020-10-15 RX ORDER — DEXTROSE MONOHYDRATE 25 G/50ML
12.5 INJECTION, SOLUTION INTRAVENOUS PRN
Status: CANCELLED | OUTPATIENT
Start: 2020-10-15

## 2020-10-15 RX ORDER — NICOTINE POLACRILEX 4 MG
15 LOZENGE BUCCAL PRN
Status: CANCELLED | OUTPATIENT
Start: 2020-10-15

## 2020-10-15 RX ORDER — LIDOCAINE HYDROCHLORIDE 20 MG/ML
INJECTION, SOLUTION INTRAVENOUS PRN
Status: DISCONTINUED | OUTPATIENT
Start: 2020-10-15 | End: 2020-10-15 | Stop reason: SDUPTHER

## 2020-10-15 RX ORDER — OXYCODONE HYDROCHLORIDE 5 MG/1
5 TABLET ORAL EVERY 4 HOURS PRN
Status: CANCELLED | OUTPATIENT
Start: 2020-10-15

## 2020-10-15 RX ORDER — NEOSTIGMINE METHYLSULFATE 1 MG/ML
INJECTION, SOLUTION INTRAVENOUS PRN
Status: DISCONTINUED | OUTPATIENT
Start: 2020-10-15 | End: 2020-10-15 | Stop reason: SDUPTHER

## 2020-10-15 RX ORDER — CLONIDINE HYDROCHLORIDE 0.1 MG/1
0.1 TABLET ORAL 3 TIMES DAILY
Status: CANCELLED | OUTPATIENT
Start: 2020-10-15

## 2020-10-15 RX ORDER — HYDROCODONE BITARTRATE AND ACETAMINOPHEN 5; 325 MG/1; MG/1
1 TABLET ORAL PRN
Status: DISCONTINUED | OUTPATIENT
Start: 2020-10-15 | End: 2020-10-15 | Stop reason: HOSPADM

## 2020-10-15 RX ORDER — MEPERIDINE HYDROCHLORIDE 25 MG/ML
12.5 INJECTION INTRAMUSCULAR; INTRAVENOUS; SUBCUTANEOUS EVERY 5 MIN PRN
Status: DISCONTINUED | OUTPATIENT
Start: 2020-10-15 | End: 2020-10-15 | Stop reason: HOSPADM

## 2020-10-15 RX ORDER — PROMETHAZINE HYDROCHLORIDE 25 MG/ML
6.25 INJECTION, SOLUTION INTRAMUSCULAR; INTRAVENOUS EVERY 10 MIN PRN
Status: DISCONTINUED | OUTPATIENT
Start: 2020-10-15 | End: 2020-10-15 | Stop reason: HOSPADM

## 2020-10-15 RX ORDER — CLINDAMYCIN PHOSPHATE 900 MG/50ML
900 INJECTION INTRAVENOUS
Status: COMPLETED | OUTPATIENT
Start: 2020-10-15 | End: 2020-10-15

## 2020-10-15 RX ORDER — HYDROCODONE BITARTRATE AND ACETAMINOPHEN 5; 325 MG/1; MG/1
2 TABLET ORAL PRN
Status: DISCONTINUED | OUTPATIENT
Start: 2020-10-15 | End: 2020-10-15 | Stop reason: HOSPADM

## 2020-10-15 RX ORDER — SODIUM CHLORIDE 0.9 % (FLUSH) 0.9 %
10 SYRINGE (ML) INJECTION EVERY 12 HOURS SCHEDULED
Status: DISCONTINUED | OUTPATIENT
Start: 2020-10-15 | End: 2020-10-15 | Stop reason: HOSPADM

## 2020-10-15 RX ORDER — SODIUM HYPOCHLORITE 5 MG/ML
SOLUTION TOPICAL PRN
Status: DISCONTINUED | OUTPATIENT
Start: 2020-10-15 | End: 2020-10-15 | Stop reason: ALTCHOICE

## 2020-10-15 RX ORDER — PROMETHAZINE HYDROCHLORIDE 25 MG/1
12.5 TABLET ORAL EVERY 6 HOURS PRN
Status: CANCELLED | OUTPATIENT
Start: 2020-10-15

## 2020-10-15 RX ORDER — AMLODIPINE BESYLATE 5 MG/1
10 TABLET ORAL DAILY
Status: CANCELLED | OUTPATIENT
Start: 2020-10-16

## 2020-10-15 RX ORDER — PROPOFOL 10 MG/ML
INJECTION, EMULSION INTRAVENOUS PRN
Status: DISCONTINUED | OUTPATIENT
Start: 2020-10-15 | End: 2020-10-15 | Stop reason: SDUPTHER

## 2020-10-15 RX ORDER — DOCUSATE SODIUM 100 MG/1
100 CAPSULE, LIQUID FILLED ORAL 2 TIMES DAILY
Status: CANCELLED | OUTPATIENT
Start: 2020-10-15

## 2020-10-15 RX ORDER — MIRTAZAPINE 15 MG/1
15 TABLET, FILM COATED ORAL NIGHTLY
Status: CANCELLED | OUTPATIENT
Start: 2020-10-15

## 2020-10-15 RX ORDER — GLYCOPYRROLATE 1 MG/5 ML
SYRINGE (ML) INTRAVENOUS PRN
Status: DISCONTINUED | OUTPATIENT
Start: 2020-10-15 | End: 2020-10-15 | Stop reason: SDUPTHER

## 2020-10-15 RX ORDER — SODIUM CHLORIDE 9 MG/ML
INJECTION, SOLUTION INTRAVENOUS CONTINUOUS
Status: DISCONTINUED | OUTPATIENT
Start: 2020-10-15 | End: 2020-10-15 | Stop reason: HOSPADM

## 2020-10-15 RX ORDER — MORPHINE SULFATE 2 MG/ML
2 INJECTION, SOLUTION INTRAMUSCULAR; INTRAVENOUS EVERY 5 MIN PRN
Status: DISCONTINUED | OUTPATIENT
Start: 2020-10-15 | End: 2020-10-15 | Stop reason: HOSPADM

## 2020-10-15 RX ORDER — OXYCODONE HYDROCHLORIDE 5 MG/1
10 TABLET ORAL EVERY 4 HOURS PRN
Status: CANCELLED | OUTPATIENT
Start: 2020-10-15

## 2020-10-15 RX ORDER — FENTANYL CITRATE 50 UG/ML
INJECTION, SOLUTION INTRAMUSCULAR; INTRAVENOUS PRN
Status: DISCONTINUED | OUTPATIENT
Start: 2020-10-15 | End: 2020-10-15 | Stop reason: SDUPTHER

## 2020-10-15 RX ORDER — DEXTROSE MONOHYDRATE 50 MG/ML
100 INJECTION, SOLUTION INTRAVENOUS PRN
Status: CANCELLED | OUTPATIENT
Start: 2020-10-15

## 2020-10-15 RX ORDER — SODIUM CHLORIDE 0.9 % (FLUSH) 0.9 %
10 SYRINGE (ML) INJECTION PRN
Status: DISCONTINUED | OUTPATIENT
Start: 2020-10-15 | End: 2020-10-15 | Stop reason: HOSPADM

## 2020-10-15 RX ORDER — SODIUM HYPOCHLORITE 2.5 MG/ML
SOLUTION TOPICAL
Qty: 1 BOTTLE | Refills: 2 | Status: SHIPPED | OUTPATIENT
Start: 2020-10-15 | End: 2020-10-22

## 2020-10-15 RX ORDER — ONDANSETRON 2 MG/ML
4 INJECTION INTRAMUSCULAR; INTRAVENOUS EVERY 6 HOURS PRN
Status: CANCELLED | OUTPATIENT
Start: 2020-10-15

## 2020-10-15 RX ORDER — DEXAMETHASONE SODIUM PHOSPHATE 10 MG/ML
INJECTION, SOLUTION INTRAMUSCULAR; INTRAVENOUS PRN
Status: DISCONTINUED | OUTPATIENT
Start: 2020-10-15 | End: 2020-10-15 | Stop reason: SDUPTHER

## 2020-10-15 RX ORDER — MORPHINE SULFATE 2 MG/ML
2 INJECTION, SOLUTION INTRAMUSCULAR; INTRAVENOUS
Status: CANCELLED | OUTPATIENT
Start: 2020-10-15

## 2020-10-15 RX ORDER — DULOXETIN HYDROCHLORIDE 60 MG/1
120 CAPSULE, DELAYED RELEASE ORAL EVERY MORNING
Status: CANCELLED | OUTPATIENT
Start: 2020-10-15

## 2020-10-15 RX ORDER — MIDAZOLAM HYDROCHLORIDE 1 MG/ML
INJECTION INTRAMUSCULAR; INTRAVENOUS PRN
Status: DISCONTINUED | OUTPATIENT
Start: 2020-10-15 | End: 2020-10-15 | Stop reason: SDUPTHER

## 2020-10-15 RX ORDER — POLYETHYLENE GLYCOL 3350 17 G/17G
17 POWDER, FOR SOLUTION ORAL DAILY PRN
Status: CANCELLED | OUTPATIENT
Start: 2020-10-15

## 2020-10-15 RX ORDER — ACETAMINOPHEN 325 MG/1
650 TABLET ORAL EVERY 4 HOURS PRN
Status: CANCELLED | OUTPATIENT
Start: 2020-10-15

## 2020-10-15 RX ORDER — HYDROCODONE BITARTRATE AND ACETAMINOPHEN 5; 325 MG/1; MG/1
1 TABLET ORAL EVERY 6 HOURS PRN
Qty: 120 TABLET | Refills: 0 | Status: SHIPPED | OUTPATIENT
Start: 2020-10-15 | End: 2020-11-14

## 2020-10-15 RX ADMIN — PROPOFOL 200 MG: 10 INJECTION, EMULSION INTRAVENOUS at 11:33

## 2020-10-15 RX ADMIN — MORPHINE SULFATE 2 MG: 2 INJECTION, SOLUTION INTRAMUSCULAR; INTRAVENOUS at 13:15

## 2020-10-15 RX ADMIN — FENTANYL CITRATE 100 MCG: 50 INJECTION, SOLUTION INTRAMUSCULAR; INTRAVENOUS at 11:37

## 2020-10-15 RX ADMIN — HYDROMORPHONE HYDROCHLORIDE 0.5 MG: 1 INJECTION, SOLUTION INTRAMUSCULAR; INTRAVENOUS; SUBCUTANEOUS at 13:58

## 2020-10-15 RX ADMIN — LIDOCAINE HYDROCHLORIDE 100 MG: 20 INJECTION, SOLUTION INTRAVENOUS at 11:37

## 2020-10-15 RX ADMIN — ONDANSETRON HYDROCHLORIDE 4 MG: 2 INJECTION, SOLUTION INTRAMUSCULAR; INTRAVENOUS at 11:43

## 2020-10-15 RX ADMIN — Medication 0.6 MG: at 12:23

## 2020-10-15 RX ADMIN — FENTANYL CITRATE 50 MCG: 50 INJECTION, SOLUTION INTRAMUSCULAR; INTRAVENOUS at 12:41

## 2020-10-15 RX ADMIN — MORPHINE SULFATE 2 MG: 2 INJECTION, SOLUTION INTRAMUSCULAR; INTRAVENOUS at 13:24

## 2020-10-15 RX ADMIN — MORPHINE SULFATE 2 MG: 2 INJECTION, SOLUTION INTRAMUSCULAR; INTRAVENOUS at 13:05

## 2020-10-15 RX ADMIN — MORPHINE SULFATE 2 MG: 2 INJECTION, SOLUTION INTRAMUSCULAR; INTRAVENOUS at 12:53

## 2020-10-15 RX ADMIN — DEXAMETHASONE SODIUM PHOSPHATE 10 MG: 10 INJECTION, SOLUTION INTRAMUSCULAR; INTRAVENOUS at 11:43

## 2020-10-15 RX ADMIN — SODIUM CHLORIDE: 9 INJECTION, SOLUTION INTRAVENOUS at 11:33

## 2020-10-15 RX ADMIN — SUGAMMADEX 218 MG: 100 INJECTION, SOLUTION INTRAVENOUS at 12:33

## 2020-10-15 RX ADMIN — MIDAZOLAM 2 MG: 1 INJECTION INTRAMUSCULAR; INTRAVENOUS at 11:33

## 2020-10-15 RX ADMIN — FENTANYL CITRATE 50 MCG: 50 INJECTION, SOLUTION INTRAMUSCULAR; INTRAVENOUS at 11:49

## 2020-10-15 RX ADMIN — SODIUM CHLORIDE: 9 INJECTION, SOLUTION INTRAVENOUS at 09:06

## 2020-10-15 RX ADMIN — Medication 3 MG: at 12:23

## 2020-10-15 RX ADMIN — ROCURONIUM BROMIDE 50 MG: 10 INJECTION, SOLUTION INTRAVENOUS at 11:37

## 2020-10-15 RX ADMIN — CLINDAMYCIN IN 5 PERCENT DEXTROSE 900 MG: 18 INJECTION, SOLUTION INTRAVENOUS at 11:48

## 2020-10-15 ASSESSMENT — PAIN DESCRIPTION - ONSET
ONSET: ON-GOING

## 2020-10-15 ASSESSMENT — PAIN SCALES - GENERAL
PAINLEVEL_OUTOF10: 10
PAINLEVEL_OUTOF10: 3
PAINLEVEL_OUTOF10: 9
PAINLEVEL_OUTOF10: 10
PAINLEVEL_OUTOF10: 6
PAINLEVEL_OUTOF10: 3
PAINLEVEL_OUTOF10: 4
PAINLEVEL_OUTOF10: 0
PAINLEVEL_OUTOF10: 10
PAINLEVEL_OUTOF10: 5

## 2020-10-15 ASSESSMENT — PAIN DESCRIPTION - DESCRIPTORS
DESCRIPTORS: ACHING;DISCOMFORT
DESCRIPTORS: ACHING;DULL
DESCRIPTORS: ACHING;DISCOMFORT
DESCRIPTORS: ACHING;DISCOMFORT
DESCRIPTORS: ACHING;DULL
DESCRIPTORS: ACHING;DISCOMFORT
DESCRIPTORS: ACHING;DISCOMFORT

## 2020-10-15 ASSESSMENT — PAIN DESCRIPTION - ORIENTATION
ORIENTATION: RIGHT

## 2020-10-15 ASSESSMENT — PULMONARY FUNCTION TESTS
PIF_VALUE: 2
PIF_VALUE: 27
PIF_VALUE: 26
PIF_VALUE: 2
PIF_VALUE: 2
PIF_VALUE: 27
PIF_VALUE: 16
PIF_VALUE: 29
PIF_VALUE: 26
PIF_VALUE: 26
PIF_VALUE: 27
PIF_VALUE: 6
PIF_VALUE: 27
PIF_VALUE: 0
PIF_VALUE: 25
PIF_VALUE: 27
PIF_VALUE: 26
PIF_VALUE: 26
PIF_VALUE: 27
PIF_VALUE: 27
PIF_VALUE: 26
PIF_VALUE: 27
PIF_VALUE: 13
PIF_VALUE: 28
PIF_VALUE: 27
PIF_VALUE: 21
PIF_VALUE: 2
PIF_VALUE: 27
PIF_VALUE: 16
PIF_VALUE: 24
PIF_VALUE: 0
PIF_VALUE: 13
PIF_VALUE: 14
PIF_VALUE: 29
PIF_VALUE: 1
PIF_VALUE: 27
PIF_VALUE: 27
PIF_VALUE: 29
PIF_VALUE: 2
PIF_VALUE: 27
PIF_VALUE: 27
PIF_VALUE: 25
PIF_VALUE: 27
PIF_VALUE: 28
PIF_VALUE: 28
PIF_VALUE: 0
PIF_VALUE: 27
PIF_VALUE: 27
PIF_VALUE: 29
PIF_VALUE: 27
PIF_VALUE: 29
PIF_VALUE: 3
PIF_VALUE: 26
PIF_VALUE: 1
PIF_VALUE: 5
PIF_VALUE: 25
PIF_VALUE: 27
PIF_VALUE: 26
PIF_VALUE: 27

## 2020-10-15 ASSESSMENT — PAIN DESCRIPTION - LOCATION
LOCATION: LEG

## 2020-10-15 ASSESSMENT — PAIN DESCRIPTION - FREQUENCY
FREQUENCY: CONTINUOUS
FREQUENCY: CONTINUOUS
FREQUENCY: INTERMITTENT
FREQUENCY: CONTINUOUS
FREQUENCY: CONTINUOUS
FREQUENCY: INTERMITTENT
FREQUENCY: CONTINUOUS

## 2020-10-15 ASSESSMENT — PAIN DESCRIPTION - PROGRESSION
CLINICAL_PROGRESSION: NOT CHANGED
CLINICAL_PROGRESSION: GRADUALLY IMPROVING
CLINICAL_PROGRESSION: GRADUALLY WORSENING
CLINICAL_PROGRESSION: NOT CHANGED
CLINICAL_PROGRESSION: GRADUALLY WORSENING
CLINICAL_PROGRESSION: NOT CHANGED
CLINICAL_PROGRESSION: GRADUALLY IMPROVING
CLINICAL_PROGRESSION: GRADUALLY WORSENING

## 2020-10-15 ASSESSMENT — LIFESTYLE VARIABLES: SMOKING_STATUS: 1

## 2020-10-15 ASSESSMENT — PAIN DESCRIPTION - PAIN TYPE
TYPE: SURGICAL PAIN

## 2020-10-15 ASSESSMENT — PAIN - FUNCTIONAL ASSESSMENT: PAIN_FUNCTIONAL_ASSESSMENT: 0-10

## 2020-10-15 NOTE — PROGRESS NOTES
Baron Bravo discussed with patient regarding being admitted. Patient is adamant that he does not want to stay.

## 2020-10-15 NOTE — ANESTHESIA PRE PROCEDURE
Department of Anesthesiology  Preprocedure Note       Name:  Jorge Thomas   Age:  60 y.o.  :  1955                                          MRN:  [id number]         Date:  10/15/2020      Surgeon: Justo Louis):  Dom Sal MD    Procedure: Procedure(s):  DEBRIDEMENT RIGHT ABOVE KNEE AMPUTATION POSS. REVISION POSS. WOUND VAC    Medications prior to admission:   Prior to Admission medications    Medication Sig Start Date End Date Taking? Authorizing Provider   cloNIDine (CATAPRES) 0.1 MG tablet TAKE ONE TABLET BY MOUTH THREE TIMES DAILY 20  Yes Juanito Kline,    oxyCODONE (OXYCONTIN) 10 MG extended release tablet Take 1 tablet by mouth every 12 hours for 30 days. 9/17/20 10/17/20 Yes Juanito Kline, DO   amLODIPine (NORVASC) 10 MG tablet TAKE ONE TABLET BY MOUTH EVERY MORNING--HOLD IF SYSTOLIC BLOOD PRESSURE IS LESS THAN 140. 9/15/20  Yes Juanito Kline, DO   DULoxetine (CYMBALTA) 60 MG extended release capsule TAKE 2 CAPSULES BY MOUTH EVERY MORNING 9/15/20  Yes Juanito Kline,    mirtazapine (REMERON) 15 MG tablet Take 1 tablet by mouth nightly 8/3/20  Yes Juanito Kline, DO   metoprolol tartrate (LOPRESSOR) 25 MG tablet Take 1 tablet by mouth 2 times daily 20  Yes Jada Ferreira MD   insulin lispro (HUMALOG) 100 UNIT/ML injection vial Inject 0-12 Units into the skin 3 times daily (with meals) 20  Yes Eddie Gomez MD   bisacodyl (DULCOLAX) 5 MG EC tablet Take 1 tablet by mouth daily as needed for Constipation 20  Yes Jada Ferreira MD   gabapentin (NEURONTIN) 400 MG capsule Take 1 capsule by mouth 3 times daily for 30 days. 6/23/20 10/15/20 Yes Jada Ferreira MD   ammonium lactate (LAC-HYDRIN) 12 % lotion Apply topically as needed. 20  Yes Jada Ferreira MD   mineral oil-hydrophilic petrolatum (HYDROPHOR) ointment Apply topically as needed.  20  Yes MD Lorie Dickson INSULIN SYRINGE 31G X 5/16\" 0.3 ML MISC USE TO INJECT INSULIN FIVE TIMES A DAY 8/10/20   Juanito Kline, DO   ONETOUCH ULTRA strip USE TO TEST BLOOD SUGARS DAILY AS NEEDED 7/22/20   Juanito Kline DO   glucose (GLUTOSE) 40 % GEL Take 37.5 mLs by mouth as needed (hypoglycemia) 6/23/20   Krystian Doherty MD   naloxone 4 MG/0.1ML LIQD nasal spray 1 spray by Nasal route as needed for Opioid Reversal 5/4/20   Royal Mairlynn MD   docusate sodium (COLACE) 100 MG capsule Take 1 capsule by mouth 2 times daily  Patient taking differently: Take 100 mg by mouth 2 times daily as needed  3/26/20   Gordon Reynolds MD   Handicap Placard MISC by Does not apply route Patient cannot walk 200 ft without stopping to rest.    Expiration 10/21/2024 10/21/19   Juanito Kline DO       Current medications:    Current Facility-Administered Medications   Medication Dose Route Frequency Provider Last Rate Last Dose    0.9 % sodium chloride infusion   Intravenous Continuous Dioni Mena MD 75 mL/hr at 10/15/20 0906      sodium chloride flush 0.9 % injection 10 mL  10 mL Intravenous 2 times per day Dioni Mena MD        sodium chloride flush 0.9 % injection 10 mL  10 mL Intravenous PRN Dioni Mena MD        clindamycin (CLEOCIN) 900 mg in dextrose 5 % 50 mL IVPB  900 mg Intravenous On Call to Stephania Diez MD           Allergies:  No Known Allergies    Problem List:    Patient Active Problem List   Diagnosis Code    DM II (diabetes mellitus, type II), controlled ([address] E11.9    HTN (hypertension) I10    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 ([address] I70.65    Coagulopathy ([address] D68.9    Moderate protein-calorie malnutrition ([address] E44.0    PVD (peripheral vascular disease) ([address] I73.9    Leukocytosis D72.829    Stage 3 chronic kidney disease N18.30    Tobacco dependence F17.200    HLD (hyperlipidemia) E78.5    History of DVT (deep vein thrombosis) Z86.718    Osteomyelitis ([address] M86.9    S/P AKA (above knee amputation), right ([address] Z89.611    History of vascular surgery Z98.890    Occlusion of common femoral artery (HCC) 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    Acute kidney injury (HCC) N17.9    Disruption of closure of muscle or muscle flap, sequela T81.32XS       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 ([address] 5/22/2018    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 ([address] 5/21/2018    Critical lower limb ischemia 3/20/2020    Diabetes mellitus ([address]     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 2 diabetes mellitus, with fat layer exposed ([address] 5/22/2018    Disruption of closure of muscle or muscle flap, sequela 8/26/2020    DVT, lower extremity (HCC)     right leg     Gas gangrene of thigh ([address] 3/20/2020    Hyperlipidemia     Hypertension     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[address]     PVD (peripheral vascular disease) ([address]        Past Surgical History:        Procedure Laterality Date    AMPUTATION ABOVE KNEE Right 4/28/2020    DEBRIDEMENT OF AMPUTATION RIGHT ABOVE KNEE performed by Reyna Pagan MD at [facility] Right 4/30/2020    DEBRIDEMENT OF AMPUTATION RIGHT ABOVE KNEE,  POSS. ABOVE KNEE REVISION, POSS.  CLOSURE, POSS.WOUND VAC -- Geno Peterson / Nury Lorenzre performed by Joseph Chisholm MD at [facility], [address] Right 3/20/2020    RIGHT LOWER EXTREMITY THROMBECTOMY, POSSIBLE ANGIOGRAM, POSSIBLE INTERVENTION, POSSIBLE BYPASS. performed by Joseph Chisholm MD at [address] Right 4/17/2020    RIGHT LEG DEBRIDEMENT INCISION AND DRAINAGE performed by Elicia Whitten MD at [address] Right 4/20/2020    RIGHT THIGH WOUND DRESSING CHANGE; DEBRIDEMENT, removal of muscle performed by Elicia Whitten MD at [address] Right 4/21/2020    RIGHT THIGH WOUND DRESSING CHANGE POSSIBLE  DEBRIDEMENT - NEEDS BEN Donohue  / Santosh Canchola TO SEE performed by Sharla Avalos MD at [address] Right 4/23/2020    RIGHT THIGH WOUND DRESSING CHANGE and DEBRIDEMENT performed by Joseph Chisholm MD at [address] [facility] Right 11/1/2018    AMPUTATION ABOVE KNEE RIGHT LEG performed by Joseph Chisholm MD at [facility] Right 5/24/2018    RIGHT FOOT INCISION AND DRAINAGE WITH PARTIAL BONE RESECTION performed by Harrison Paris DPM at [address] [facility] OFFICE/OUTPT VISIT,PROCEDURE ONLY Right 8/3/2018    INCISION AND DRAINAGE MULTIPLE AREAS RIGHT FOOT WITH DEBRIDEMENT SOFT TISSUE performed by Harrison Paris DPM at [facility] Right     leg        Social History:    Social History     Tobacco Use    Smoking status: Current Every Day Smoker     Packs/day: 0.50     Years: 7.00     Pack years: 3.50     Types: Cigarettes    Smokeless tobacco: Never Used   Substance Use Topics    Alcohol use: No     Frequency: Never                                Ready to quit: Not Answered  Counseling given: Not Answered      Vital Signs (Current):   Vitals:    10/13/20 1025 10/15/20 0832   BP:  (!) 170/85   Pulse:  77   Resp:  20   Temp:  37 °C (98.6 °F)   TempSrc:  Temporal   SpO2: 4/16/2020  Ventricular Rate  79  BPM  Final  04/16/2020  2:21 PM  [facility]    Atrial Rate  79  BPM  Final  04/16/2020  2:21 PM  [facility]    P-R Interval  244  ms  Final  04/16/2020  2:21 PM  [facility]    QRS Duration  98  ms  Final  04/16/2020  2:21 PM  [facility]    Q-T Interval  426  ms  Final  04/16/2020  2:21 PM  [facility]    QTc Calculation (Bazett)  488  ms  Final  04/16/2020  2:21 PM  [facility]    P Axis  21  degrees  Final  04/16/2020  2:21 PM  [facility]    R Axis  14  degrees  Final  04/16/2020  2:21 PM  [facility]    T Axis  46  degrees  Final  04/16/2020  2:21 PM  [facility]    Testing Performed By     Lab - [address]  Name  Director  Address  Valid Date Range    360-[facility]  [facility] MUSE  Unknown  Unknown  04/18/16 0721-Present    Narrative & Impression     Sinus rhythm with 1st degree AV block with premature supraventricular complexes  Nonspecific T wave abnormality  Abnormal ECG  Confirmed by Lachelle Breen ([id number]) on 4/17/2020 3:56:14 PM     Chest X-ray 06/04/2020  Impression         1. Tip of the left arm PICC line is at the level of the proximal SVC. 2. Small left basilar opacity, which may represent atelectasis,    pneumonia, effusion or combination thereof. Anesthesia Evaluation  Patient summary reviewed and Nursing notes reviewed no history of anesthetic complications:   Airway: Mallampati: III  TM distance: >3 FB   Neck ROM: full  Mouth opening: > = 3 FB Dental:          Pulmonary:   (+) decreased breath sounds,  current smoker                           Cardiovascular:    (+) hypertension:, hyperlipidemia      ECG reviewed  Rhythm: regular  Rate: normal  Echocardiogram reviewed               ROS comment: Echo 03/2020 Summary   No source of embolus found. No intra-cardiac mass, thrombus or vegetations. Overall ejection fraction is normal.   Normal right ventricle structure and function. Mild mitral regurgitation is present.      Neuro/Psych:   (+) neuromuscular disease:, GI/Hepatic/Renal:   (+) renal disease: ESRD and dialysis,           Endo/Other:    (+) DiabetesType II DM, poorly controlled, , blood dyscrasia: anemia:., .                  ROS comment: 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[facility]  Abdominal:           Vascular:   + PVD, aortic or cerebral, DVT, .        ROS comment: S/p femoral bypass  S/p fem endarterectomy  S/p aka. Anesthesia Plan      general     ASA 3     (Pt assessed prior to anesthesia, note entered as soon as able)  Induction: intravenous. Anesthetic plan and risks discussed with patient. Plan discussed with attending. SHONDA Chu - KIRSTY   10/15/2020      Pt seen, examined, chart reviewed, plan discussed.   Louise Mcdonald  10/15/2020  1:46 PM

## 2020-10-15 NOTE — TELEPHONE ENCOUNTER
Med pended. He is also asking for Hydrocodone 5mg that is not on list for me to RF?  He said to let you know he is not staying in the hospital so he will need his scripts Saturday

## 2020-10-15 NOTE — PROGRESS NOTES
Patient admitted to [facility] Patient placed on appropriate monitors. Dressing dry and intact. . Patient assisted with liquids and nutrition.

## 2020-10-15 NOTE — PROGRESS NOTES
COVID testing completed on: not done per current hospital protocol. Results of the test: n/a  The patient verbally confirms that they did adhere to the self-quarantine guidelines. No signs or symptoms expressed or observed.

## 2020-10-15 NOTE — H&P
Vascular Surgery History & Physical Exam      Chief Complaint: Chronic wound of the R AKA    HISTORY OF PRESENT ILLNESS:                The patient is a 60 y.o. male who presents to the hospital for elective debridement of chronic wound of the R AKA, possible aka revision. IMPRESSION:  Chronic wound of the R AKA    PLAN:  Debridement of chronic wound of the possible aka revision. Possible wound vac, possible advanced skin therapy. I reviewed the procedure with the patient and family as available. I discussed the procedure, risks, benefits, complications, and alternatives of the procedure. They understand and consent.   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 ([address] 5/22/2018    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 ([address] 5/21/2018    Critical lower limb ischemia 3/20/2020    Diabetes mellitus ([address]     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 2 diabetes mellitus, with fat layer exposed ([address] 5/22/2018    Disruption of closure of muscle or muscle flap, sequela 8/26/2020    DVT, lower extremity (HCC)     right leg     Gas gangrene of thigh ([address] 3/20/2020    Hyperlipidemia     Hypertension     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[address]     PVD (peripheral vascular disease) ([address]      Past Surgical History:   Procedure Laterality Date    AMPUTATION ABOVE KNEE Right 4/28/2020    DEBRIDEMENT OF AMPUTATION RIGHT ABOVE KNEE performed by Amisha Martines MD at [facility] 4/30/2020    DEBRIDEMENT OF AMPUTATION RIGHT ABOVE KNEE,  POSS. ABOVE KNEE REVISION, POSS. CLOSURE, POSS.WOUND VAC -- BEN Ingram / Randal Barros TO LOOK IN performed by Tony Reyes MD at [address] Right 3/20/2020    RIGHT LOWER EXTREMITY THROMBECTOMY, POSSIBLE ANGIOGRAM, POSSIBLE INTERVENTION, POSSIBLE BYPASS. performed by Tony Reyes MD at [address] Right 4/17/2020    RIGHT LEG DEBRIDEMENT INCISION AND DRAINAGE performed by My Stauffer MD at [address] [address] Right 4/20/2020    RIGHT THIGH WOUND DRESSING CHANGE; DEBRIDEMENT, removal of muscle performed by My Stauffer MD at [address] Right 4/21/2020    RIGHT THIGH WOUND DRESSING CHANGE POSSIBLE  DEBRIDEMENT - NEEDS BEN Ingram / Wicho Olivier TO SEE performed by Jorge Samuel MD at [address] Right 4/23/2020    RIGHT THIGH WOUND DRESSING CHANGE and DEBRIDEMENT performed by Tony Reyes MD at [address] [place] [address] Se Right 11/1/2018    AMPUTATION ABOVE KNEE RIGHT LEG performed by Tony Reyes MD at [place] [address] Right 5/24/2018    RIGHT FOOT INCISION AND DRAINAGE WITH PARTIAL BONE RESECTION performed by Brian Guerrero DPM at [address] [place] [address] OFFICE/OUTPT VISIT,PROCEDURE ONLY Right 8/3/2018    INCISION AND DRAINAGE MULTIPLE AREAS RIGHT FOOT WITH DEBRIDEMENT SOFT TISSUE performed by Brian Guerrero DPM at [facility] Right     leg      Current Medications:     Current Facility-Administered Medications:     0.9 % sodium chloride infusion, , Intravenous, Continuous, Tony Reyes MD, Last Rate: 75 mL/hr at 10/15/20 0906    sodium chloride flush 0.9 % injection 10 mL, 10 mL, Intravenous, 2 times per day, Tony Reyes MD    sodium chloride flush 0.9 % injection 10 mL, 10 mL, Intravenous, PRN, Tony Reyes CONSTITUTIONAL:  awake, alert, cooperative, no apparent distress, and appears stated age  NECK:  supple, symmetrical, trachea midline  LUNGS:  no increased work of breathing, good resp excursion  CARDIOVASCULAR:  regular rate and rhythm  ABDOMEN:  soft, non-distended and non-tender  Chronic wound of the R AKA    LABS:    Lab Results   Component Value Date    WBC 8.5 10/15/2020    HGB 11.3 (L) 10/15/2020    HCT 35.2 (L) 10/15/2020     10/15/2020    PROTIME 14.7 (H) 04/27/2020    INR 1.3 04/27/2020    APTT 31.6 04/16/2020    K 4.2 06/18/2020    BUN 19 06/18/2020    CREATININE 1.9 (H) 06/18/2020       RADIOLOGY:    Joanne Montalvo

## 2020-10-15 NOTE — OP NOTE
Operative Note      Patient: Olga Moreno  YOB: 1955  MRN: [id number]    Date of Procedure: 10/15/2020    Pre-Op Diagnosis: NON HEALING SURG. WOUND R AKA    Post-Op Diagnosis: Same       Procedure :  Right above knee amputation debridement, skin, subq and muscle  Measurements  10x4. 5x5    Surgeon(s):  Reyna Pagan MD    Assistant:   Surgical Assistant: SHONDA Us CNP    Anesthesia: Monitor Anesthesia Care    Estimated Blood Loss (mL): 952     Complications: None    Specimens:   ID Type Source Tests Collected by Time Destination   1 : RIGHT HIP WOUND CULTURE Tissue Tissue CULTURE, ANAEROBIC, CULTURE, FUNGUS, GRAM STAIN, CULTURE, SURGICAL Reyna Pagan MD 10/15/2020 1209    A : RIGHT HIP SCAR TISSUE Tissue Tissue SURGICAL PATHOLOGY Reyna Pagan MD 10/15/2020 1205        Implants:  * No implants in log *      Drains: * No LDAs found *    Findings: no obvious infection   Tracks to bone    DESCRIPTION OF PROCEDURE: The patient was identified and the procedure was confirmed. The wound and surrounding area was prepped and draped in sterile fashion. With the patient in supine position, the wound was debrided sharply of fibrotic, necrotic, and hyperkeratotic tissue, including a layer of surrounding healthy tissue using a 10 blade and cautery for a total surface area of > 20 cm2. The skin was excised through the level of the subcutaneous tissue , fascia and muscle. The bone was exposed. There was no obvious purulence. Primarily scar and necrotic fat. Cultures were sent. 100%% of the wound was debrided. Wound was copiously irrigated with normal saline solution. There was minimal bleeding that was controlled with pressure. Dressings consisting of daikins moistened kerlex were applied to the wound. Needle, sponge and instrument counts were reported as correct x2.  The patient tolerated the procedure and was transferred to the recovery area in satisfactory condition.      Electronically signed by Sadiq Colindres MD on 10/15/2020 at 1:50 PM

## 2020-10-16 LAB — GRAM STAIN ORDERABLE: NORMAL

## 2020-10-16 RX ORDER — CILOSTAZOL 50 MG/1
50 TABLET ORAL 2 TIMES DAILY
Qty: 60 TABLET | Refills: 3 | Status: SHIPPED | OUTPATIENT
Start: 2020-10-16 | End: 2021-01-15 | Stop reason: SDUPTHER

## 2020-10-17 LAB
CULTURE SURGICAL: ABNORMAL
ORGANISM: ABNORMAL

## 2020-10-18 LAB
ANAEROBIC CULTURE: ABNORMAL
ANAEROBIC CULTURE: ABNORMAL
ORGANISM: ABNORMAL

## 2020-10-21 ENCOUNTER — HOSPITAL ENCOUNTER (OUTPATIENT)
Dept: WOUND CARE | Age: 65
Discharge: HOME | End: 2020-10-21
Payer: COMMERCIAL

## 2020-10-21 VITALS
SYSTOLIC BLOOD PRESSURE: 124 MMHG | HEART RATE: 74 BPM | DIASTOLIC BLOOD PRESSURE: 62 MMHG | BODY MASS INDEX: 30.8 KG/M2 | HEIGHT: 74 IN | WEIGHT: 240 LBS | RESPIRATION RATE: 20 BRPM | TEMPERATURE: 96.9 F

## 2020-10-21 PROCEDURE — 99213 OFFICE O/P EST LOW 20 MIN: CPT

## 2020-10-21 PROCEDURE — 99213 OFFICE O/P EST LOW 20 MIN: CPT | Performed by: SURGERY

## 2020-10-21 RX ORDER — LIDOCAINE HYDROCHLORIDE 40 MG/ML
SOLUTION TOPICAL ONCE
Status: DISCONTINUED | OUTPATIENT
Start: 2020-10-21 | End: 2020-10-22 | Stop reason: HOSPADM

## 2020-10-21 RX ORDER — DOXYCYCLINE HYCLATE 100 MG
100 TABLET ORAL 2 TIMES DAILY
Qty: 20 TABLET | Refills: 0 | Status: SHIPPED | OUTPATIENT
Start: 2020-10-21 | End: 2020-10-31

## 2020-10-21 RX ORDER — SODIUM HYPOCHLORITE 1.25 MG/ML
SOLUTION TOPICAL DAILY
Qty: 1 BOTTLE | Refills: 1 | Status: SHIPPED | OUTPATIENT
Start: 2020-10-21 | End: 2020-11-04 | Stop reason: ALTCHOICE

## 2020-10-21 ASSESSMENT — PAIN DESCRIPTION - ORIENTATION: ORIENTATION: RIGHT

## 2020-10-21 ASSESSMENT — PAIN DESCRIPTION - ONSET: ONSET: ON-GOING

## 2020-10-21 ASSESSMENT — PAIN SCALES - GENERAL: PAINLEVEL_OUTOF10: 10

## 2020-10-21 ASSESSMENT — PAIN - FUNCTIONAL ASSESSMENT: PAIN_FUNCTIONAL_ASSESSMENT: PREVENTS OR INTERFERES SOME ACTIVE ACTIVITIES AND ADLS

## 2020-10-21 ASSESSMENT — PAIN DESCRIPTION - PAIN TYPE: TYPE: SURGICAL PAIN

## 2020-10-21 ASSESSMENT — PAIN DESCRIPTION - FREQUENCY: FREQUENCY: CONTINUOUS

## 2020-10-21 ASSESSMENT — PAIN DESCRIPTION - LOCATION: LOCATION: LEG

## 2020-10-21 ASSESSMENT — PAIN DESCRIPTION - PROGRESSION: CLINICAL_PROGRESSION: GRADUALLY WORSENING

## 2020-10-21 NOTE — PROGRESS NOTES
up  · aquacell ag packing  · Discussed potential need for further surgical exploration in future - pt would like to avoid hospital and OR if possible  9/2/20  · Wound much improved - depth less  · Start doxycyline for staph culture  9/9/20  · Depth 5 cm again, fluid seems less  9/23/20  · Tunneling now 8 cm, concern that hittig bone  · Discussed with patient continuing current reigmen unlikely to result in healing  · Would benefit from further surgical debridement, likely revision of amputation site and possible wound vac  · Patient would like to consider options and will let me know next week  9/30/20  · Significant tunneling still  · Will proceed with or in 2 weeks per pt request  10/21/20  · OR 10/14/20  · dakins moistened kerlex  · Discussed with Dr. Dean Casas re pain control issues  · Doxycycline 10 days script given    Wound/Ulcer Pain Timing/Severity: severe  Quality of pain: aching, throbbing, shooting   Severity:  8 / 10   Modifying Factors: Pain worsens with dressing changes, pressure, debridement  Associated Signs/Symptoms: drainage and pain    Ulcer Identification:  Ulcer Type: arterial, diabetic, pressure and non-healing surgical  Contributing Factors: edema, diabetes, poor glucose control, chronic pressure, decreased mobility, shear force, obesity and arterial insufficiency    Diabetic/Pressure/Non Pressure Ulcers only:  Ulcer: Diabetic ulcer, fat layer exposed    Wound: Wound dehiscence        PAST MEDICAL HISTORY      Diagnosis Date    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 ([address] 5/22/2018    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 ([address] 5/21/2018    Critical lower limb ischemia 3/20/2020    Diabetes mellitus ([address]     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 2 diabetes mellitus, with fat layer exposed ([address] 5/22/2018    Disruption of closure of muscle or muscle flap, sequela 8/26/2020    DVT, lower extremity [facility]     right leg     Gas gangrene of thigh ([facility] 3/20/2020    Hyperlipidemia     Hypertension     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[facility]     PVD (peripheral vascular disease) ([facility]      Past Surgical History:   Procedure Laterality Date    AMPUTATION ABOVE KNEE Right 4/28/2020    DEBRIDEMENT OF AMPUTATION RIGHT ABOVE KNEE performed by Tamra Remy MD at [facility] Right 4/30/2020    DEBRIDEMENT OF AMPUTATION RIGHT ABOVE KNEE,  POSS. ABOVE KNEE REVISION, POSS. CLOSURE, POSS.WOUND VAC -- BEN Hinojosa / Aliyah Morel TO LOOK IN performed by Tamra Remy MD at [address] Right 3/20/2020    RIGHT LOWER EXTREMITY THROMBECTOMY, POSSIBLE ANGIOGRAM, POSSIBLE INTERVENTION, POSSIBLE BYPASS. performed by Tamra Remy MD at [address] [address] Right 4/17/2020    RIGHT LEG DEBRIDEMENT INCISION AND DRAINAGE performed by Sailaja Coleman MD at [address] [address] Right 4/20/2020    RIGHT THIGH WOUND DRESSING CHANGE; DEBRIDEMENT, removal of muscle performed by Sailaja Coleman MD at [address] [address] Right 4/21/2020    RIGHT THIGH WOUND DRESSING CHANGE POSSIBLE  DEBRIDEMENT - NEEDS BEN Hinojosa / JANETTE TO SEE performed by Brenda Hsieh MD at [address] Right 4/23/2020    RIGHT THIGH WOUND DRESSING CHANGE and DEBRIDEMENT performed by Tamra Remy MD at [address] [address] Right 10/15/2020    DEBRIDEMENT RIGHT ABOVE KNEE AMPUTATION POSS.  REVISION POSS. WOUND VAC performed by Tamra Remy MD at [facility] Right 11/1/2018    AMPUTATION ABOVE KNEE RIGHT LEG performed by Tamra Remy MD at [facility] Right 5/24/2018    RIGHT FOOT INCISION AND DRAINAGE WITH PARTIAL BONE RESECTION performed by Shaista Carpio DPM at [facility] OFFICE/OUTPT VISIT,PROCEDURE ONLY Right 8/3/2018 INCISION AND DRAINAGE MULTIPLE AREAS RIGHT FOOT WITH DEBRIDEMENT SOFT TISSUE performed by Trinidad Pereyra DPM at [facility] OR    RHINOPLASTY Right     leg      Family History   Problem Relation Age of Onset    Cancer Mother     Diabetes Father     Heart Failure Father     Hypertension Father     Cancer Sister      Social History     Tobacco Use    Smoking status: Current Every Day Smoker     Packs/day: 0.50     Years: 7.00     Pack years: 3.50     Types: Cigarettes    Smokeless tobacco: Never Used   Substance Use Topics    Alcohol use: No     Frequency: Never    Drug use: No     No Known Allergies  Current Outpatient Medications on File Prior to Encounter   Medication Sig Dispense Refill    cilostazol (PLETAL) 50 MG tablet Take 1 tablet by mouth 2 times daily 60 tablet 3    metoprolol tartrate (LOPRESSOR) 25 MG tablet Take 1 tablet by mouth 2 times daily 60 tablet 3    oxyCODONE (OXYCONTIN) 10 MG extended release tablet Take 1 tablet by mouth every 12 hours for 30 days. 60 each 0    sodium hypochlorite (DAKINS) 0.25 % external solution Apply topically once. 1 Bottle 2    HYDROcodone-acetaminophen (NORCO) 5-325 MG per tablet Take 1 tablet by mouth every 6 hours as needed for Pain for up to 30 days.  Intended supply: 30 days 120 tablet 0    cloNIDine (CATAPRES) 0.1 MG tablet TAKE ONE TABLET BY MOUTH THREE TIMES DAILY 60 tablet 3    amLODIPine (NORVASC) 10 MG tablet TAKE ONE TABLET BY MOUTH EVERY MORNING--HOLD IF SYSTOLIC BLOOD PRESSURE IS LESS THAN 140. 30 tablet 3    DULoxetine (CYMBALTA) 60 MG extended release capsule TAKE 2 CAPSULES BY MOUTH EVERY MORNING 60 capsule 3    mirtazapine (REMERON) 15 MG tablet Take 1 tablet by mouth nightly 30 tablet 5    glucose (GLUTOSE) 40 % GEL Take 37.5 mLs by mouth as needed (hypoglycemia) 45 g 1    insulin lispro (HUMALOG) 100 UNIT/ML injection vial Inject 0-12 Units into the skin 3 times daily (with meals) 1 vial 3    bisacodyl (DULCOLAX) 5 MG EC tablet Take 1 tablet by mouth daily as needed for Constipation 30 tablet 0    gabapentin (NEURONTIN) 400 MG capsule Take 1 capsule by mouth 3 times daily for 30 days. 90 capsule 3    ammonium lactate (LAC-HYDRIN) 12 % lotion Apply topically as needed. 1 Bottle 0    docusate sodium (COLACE) 100 MG capsule Take 1 capsule by mouth 2 times daily (Patient taking differently: Take 100 mg by mouth 2 times daily as needed ) 50 capsule 0    ULTICARE INSULIN SYRINGE 31G X 5/16\" 0.3 ML MISC USE TO INJECT INSULIN FIVE TIMES A  each 3    ONETOUCH ULTRA strip USE TO TEST BLOOD SUGARS DAILY AS NEEDED 100 each 2    mineral oil-hydrophilic petrolatum (HYDROPHOR) ointment Apply topically as needed. 1 Tube 0    naloxone 4 MG/0.1ML LIQD nasal spray 1 spray by Nasal route as needed for Opioid Reversal 1 each 5    Handicap Placard MISC by Does not apply route Patient cannot walk 200 ft without stopping to rest.    Expiration 10/21/2024 1 each 0     No current facility-administered medications on file prior to encounter.         REVIEW OF SYSTEMS See HPI    Objective:    /62   Pulse 74   Temp 96.9 °F (36.1 °C) (Temporal)   Resp 20   Ht 6' 2\" (1.88 m)   Wt 240 lb (108.9 kg)   BMI 30.81 kg/m²   Wt Readings from Last 3 Encounters:   10/21/20 240 lb (108.9 kg)   10/15/20 240 lb (108.9 kg)   09/30/20 240 lb (108.9 kg)     PHYSICAL EXAM  CONSTITUTIONAL:   Awake, alert, cooperative   EYES:  lids and lashes normal   ENT: external ears and nose without lesions   NECK:  supple, symmetrical, trachea midline   SKIN:  Open wound Present    Assessment:     Problem List Items Addressed This Visit     Disruption of closure of muscle or muscle flap, sequela (Chronic)    Ischemic ulcer of right thigh with fat layer exposed ([facility] - Primary          Pre Debridement Measurements:  Are located in the [place]  Documentation Flow Sheet  Post Debridement Measurements:  Wound/Ulcer Descriptions are Pre Debridement except measurements:     Wound Buttocks Right;Upper; Inner (Active)   Number of days:        Wound 08/26/20 Other (Comment) Right;Lateral #1 right AKA site (Active)   Wound Image   10/21/20 1151   Wound Etiology Non-Healing Surgical 08/26/20 1103   Wound Cleansed Irrigated with saline 10/21/20 1215   Dressing/Treatment Alginate with Ag;ABD 10/21/20 1215   Wound Length (cm) 9.5 cm 10/21/20 1151   Wound Width (cm) 4.2 cm 10/21/20 1151   Wound Depth (cm) 6.5 cm 10/21/20 1151   Wound Surface Area (cm^2) 39.9 cm^2 10/21/20 1151   Change in Wound Size % (l*w) -32318 10/21/20 1151   Wound Volume (cm^3) 259.35 cm^3 10/21/20 1151   Wound Healing % -79071 10/21/20 1151   Post-Procedure Length (cm) 0.4 cm 09/30/20 1212   Post-Procedure Width (cm) 0.4 cm 09/30/20 1212   Post-Procedure Depth (cm) 6 cm 09/30/20 1212   Post-Procedure Surface Area (cm^2) 0.16 cm^2 09/30/20 1212   Post-Procedure Volume (cm^3) 0.96 cm^3 09/30/20 1212   Distance Tunneling (cm) 6 cm 09/30/20 1212   Tunneling Position ___ O'Clock 3 09/30/20 1212   Wound Assessment Fibrin;Pale granulation tissue;Granulation tissue 10/21/20 1151   Drainage Amount Copious 10/21/20 1151   Drainage Description Serous; Yellow 10/21/20 1151   Odor None 10/21/20 1151   Nadia-wound Assessment Intact 10/21/20 1151   Wound Thickness Description not for Pressure Injury Full thickness 08/26/20 1103   Number of days: 56     Incision 04/28/20 Thigh Right;Medial (Active)   Dressing/Treatment Dry dressing;ABD pad 10/15/20 1545   Drainage Amount None 10/15/20 1545   Number of days: 176     No debridement    Plan:   Treatment Note please see attached Discharge Instructions    Written patient dismissal instructions given to patient and signed by patient or POA.          Discharge Instructions       Visit Discharge/Physician Orders     Discharge condition: Stable     Assessment of pain at discharge:yes     Anesthetic used: 4% lidocaine soln.     Discharge to: Home     Left via:Private automobile     Accompanied by: marybeth     ECF/H[facility]: [facility]     Dressing Orders:RIGHT AKA SITE-Cleanse with normal saline, pack loosely with 1/4 strength dakins solution, dry dressing and secure. Change daily.     Treatment Orders:Eat a diet high in protein and vitamin C. Take a multiple vitamin daily unless contraindicated.      [address] [place] [address] followup visit: 1 week _____________________________  (Please note your next appointment above and if you are unable to keep, kindly give a 24 hour notice. Thank you.)     Physician signature:__________________________        If you experience any of the following, please call the [address] [place] [address] during business hours:     * Increase in Pain  * Temperature over 101  * Increase in drainage from your wound  * Drainage with a foul odor  * Bleeding  * Increase in swelling  * Need for compression bandage changes due to slippage, breakthrough drainage.     If you need medical attention outside of the business hours of the [address] [place] [address] please contact your PCP or go to the nearest emergency room.                             MAKING THE 1/4 STRENGTH  DAKIN'S  SOLUTION:    1. [address] YOUR HANDS WELL WITH SOAP AND WATER. 2. MEASURE OUT 32 OUNCES (4 CUPS) OF TAP WATER. POUR INTO A CLEAN PAN. 3. BOIL WATER FOR 15 MINUTES WITH THE LID ON THE PAN. REMOVE FROM HEAT. 4. USING A STERILE (BOILED / ) SPOON, ADD 1/2 TEASPOONFUL OF BAKING SODA TO THE BOILED WATER. 5. ADD 1 TABLESPOON PLUS 2 TEASPOONS (OR 24 ML) OF CLOROX TO THE 32 OUNCES OF WATER. 6. PLACE SOLUTION IN A STERILE JAR. CLOSE TIGHTLY WITH A STERILE LID. COVER ENTIRE JAR WITH ALUMINUM FOIL TO PROTECT IT FROM LIGHT. 7. THROW AWAY ANY UNUSED PORTION 48 HOURS AFTER OPENING. 8. LABEL THE JAR WITH TIME AND DATE PREPARED. 9. PRECAUTIONS: KEEP OUT OF REACH OF CHILDREN                                   DO NOT SWALLOW                                   DO NOT USE LONGER THAN ONE WEEK, UNLESS INSTRUCTED BY DOCTOR.                                    DO NOT USE IF ALLERGIC TO ANY OF THE INGREDIENTS. STOP USING THE SOLUTION IF YOUR CONDITION WORSENS, OR A RASH OR ANY OTHER REACTION OCCURS. CALL DOCTOR IF: PAIN OR BURNING SENSATION.                                         RASH OR ITCHING                                        REDNESS OF SKIN                                        SWELLING, HIVES OR BLISTERS                                        SIGNS OR SYMPTOMS OF WOUND INFECTION             Electronically signed by Callie Gallardo MD on 10/21/2020 at 12:23 PM

## 2020-10-28 ENCOUNTER — HOSPITAL ENCOUNTER (OUTPATIENT)
Dept: WOUND CARE | Age: 65
Discharge: HOME | End: 2020-10-28
Payer: COMMERCIAL

## 2020-10-28 VITALS
DIASTOLIC BLOOD PRESSURE: 78 MMHG | HEART RATE: 80 BPM | RESPIRATION RATE: 18 BRPM | SYSTOLIC BLOOD PRESSURE: 146 MMHG | BODY MASS INDEX: 30.8 KG/M2 | HEIGHT: 74 IN | TEMPERATURE: 98.9 F | WEIGHT: 240 LBS

## 2020-10-28 PROCEDURE — 11043 DBRDMT MUSC&/FSCA 1ST 20/<: CPT | Performed by: SURGERY

## 2020-10-28 PROCEDURE — 11043 DBRDMT MUSC&/FSCA 1ST 20/<: CPT

## 2020-10-28 RX ORDER — LIDOCAINE HYDROCHLORIDE 40 MG/ML
SOLUTION TOPICAL ONCE
Status: DISCONTINUED | OUTPATIENT
Start: 2020-10-28 | End: 2020-10-29 | Stop reason: HOSPADM

## 2020-10-28 ASSESSMENT — PAIN DESCRIPTION - LOCATION: LOCATION: LEG

## 2020-10-28 ASSESSMENT — PAIN DESCRIPTION - PAIN TYPE: TYPE: SURGICAL PAIN

## 2020-10-28 ASSESSMENT — PAIN DESCRIPTION - FREQUENCY: FREQUENCY: INTERMITTENT

## 2020-10-28 ASSESSMENT — PAIN SCALES - GENERAL: PAINLEVEL_OUTOF10: 6

## 2020-10-28 ASSESSMENT — PAIN - FUNCTIONAL ASSESSMENT: PAIN_FUNCTIONAL_ASSESSMENT: PREVENTS OR INTERFERES SOME ACTIVE ACTIVITIES AND ADLS

## 2020-10-28 ASSESSMENT — PAIN DESCRIPTION - ORIENTATION: ORIENTATION: RIGHT

## 2020-10-28 ASSESSMENT — PAIN DESCRIPTION - DESCRIPTORS: DESCRIPTORS: BURNING

## 2020-10-28 ASSESSMENT — PAIN DESCRIPTION - ONSET: ONSET: ON-GOING

## 2020-10-28 ASSESSMENT — PAIN DESCRIPTION - PROGRESSION: CLINICAL_PROGRESSION: NOT CHANGED

## 2020-10-29 NOTE — PROGRESS NOTES
Wound Healing Center Followup Visit Note    Referring Physician : Nohelia Norris Str RECORD NUMBER:  [id number]  AGE: 60 y.o. GENDER: male  : 1955  EPISODE DATE:  10/28/2020    Subjective:     Chief Complaint   Patient presents with    Wound Check     right amp site      HISTORY of PRESENT ILLNESS HPI   Olga Moreno is a 60 y.o. male who presents today in regards to follow up evaluation and treatment of wound/ulcer. That patient's past medical, family and social hx were reviewed and changes were made if present. History of Wound Context:  Pt presents in regards to chronic R above knee amputation wound was since 2020. He had developed postoperatively dehiscence of his wound site which had previously undergone a muscle flap. He was having a packed per home health care. The wound had gotten so small that it was difficult to pack. He is still having drainage significant though. At first visit to wound healing center 20 his wound has not been improving. He was not on abx at initial visit to center.     Previous lower extremity procedures  2018  R AKA for chronic R LE wound   3/20/2020 R groin exploration with iliofemoral artery embolectomy, primary closure of arteriotomy, R deep femoral atery embolectomy   2020 Excisional debridement of infected necrotic  tissue, right groin and right thigh   2020 RIGHT THIGH WOUND DRESSING CHANGE and DEBRIDEMENT   2020 Irrigation and excisional debridement of Right Thigh above knee amputation wound   2020 Irrigation and excisional debridement of the Right thigh above knee amputation wound 25 x45 cm  Excision of prosthetic R LE bypass graft  Ligation of right SFA  Posterior hamstring myocutaneous flap 65 cm flap for open wound of 45 x 25 cm per Dr. Fabián Hui     20  · Wound itself is small - opened up as difficult to pack  · 5 cm depth - culture done, significant amount of fluid expressed once opened up  · aquacell ag packing  · Discussed potential need for further surgical exploration in future - pt would like to avoid hospital and OR if possible  9/2/20  · Wound much improved - depth less  · Start doxycyline for staph culture  9/9/20  · Depth 5 cm again, fluid seems less  9/23/20  · Tunneling now 8 cm, concern that hittig bone  · Discussed with patient continuing current reigmen unlikely to result in healing  · Would benefit from further surgical debridement, likely revision of amputation site and possible wound vac  · Patient would like to consider options and will let me know next week  9/30/20  · Significant tunneling still  · Will proceed with or in 2 weeks per pt request  10/21/20  · OR 10/14/20  · dakins moistened kerlex  · Discussed with Dr. Mildred Castleman re pain control issues  · Doxycycline 10 days script given  10/28/20  · Pain better controlled  · + Granulation tissue  · Bone less exposed    Wound/Ulcer Pain Timing/Severity: moderate  Quality of pain: aching, throbbing, shooting   Severity:  6 / 10   Modifying Factors: Pain worsens with dressing changes, pressure, debridement  Associated Signs/Symptoms: drainage and pain    Ulcer Identification:  Ulcer Type: arterial, diabetic, pressure and non-healing surgical  Contributing Factors: edema, diabetes, poor glucose control, chronic pressure, decreased mobility, shear force, obesity and arterial insufficiency    Diabetic/Pressure/Non Pressure Ulcers only:  Ulcer: Diabetic ulcer, fat layer exposed    Wound: Wound dehiscence        PAST MEDICAL HISTORY      Diagnosis Date    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 ([address] 5/22/2018    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 ([address] 5/21/2018    Critical lower limb ischemia 3/20/2020    Diabetes mellitus ([address]     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 2 diabetes mellitus, with fat layer exposed ([address] 5/22/2018    Disruption of closure of muscle or muscle flap, sequela 8/26/2020    DVT, lower extremity (HCC)     right leg     Gas gangrene of thigh ([facility] 3/20/2020    Hyperlipidemia     Hypertension     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[facility]     [facility]ipheral vascular [facility]      Past Surgical History:   Procedure Laterality Date    AMPUTATION ABOVE KNEE Right 4/28/2020    DEBRIDEMENT OF AMPUTATION RIGHT ABOVE KNEE performed by Dom Sal MD at [facility] Right 4/30/2020    DEBRIDEMENT OF AMPUTATION RIGHT ABOVE KNEE,  POSS. ABOVE KNEE REVISION, POSS. CLOSURE, POSS.WOUND VAC -- BEN Denney / [place] Alert TO LOOK IN performed by Dom Sal MD at [place] [address] Right 3/20/2020    RIGHT LOWER EXTREMITY THROMBECTOMY, POSSIBLE ANGIOGRAM, POSSIBLE INTERVENTION, POSSIBLE BYPASS. performed by Dom Sal MD at [address] [place] [address] Right 4/17/2020    RIGHT LEG DEBRIDEMENT INCISION AND DRAINAGE performed by Tala Falcon MD at [address] [place] [address] Right 4/20/2020    RIGHT THIGH WOUND DRESSING CHANGE; DEBRIDEMENT, removal of muscle performed by Tala Flacon MD at [address] [place] [address] Right 4/21/2020    RIGHT THIGH WOUND DRESSING CHANGE POSSIBLE  DEBRIDEMENT - NEEDS BEN Denney / JANETTE TO SEE performed by Jannet Vivar MD at [address] [place] [address] Right 4/23/2020    RIGHT THIGH WOUND DRESSING CHANGE and DEBRIDEMENT performed by Dom Sal MD at [address] [place] [address] Right 10/15/2020    DEBRIDEMENT RIGHT ABOVE KNEE AMPUTATION POSS.  REVISION POSS. WOUND VAC performed by Dom Sal MD at [address] [place] [address] Right 11/1/2018    AMPUTATION ABOVE KNEE RIGHT LEG performed by Dom Sal MD at [facility] Right 5/24/2018    RIGHT FOOT INCISION AND DRAINAGE WITH PARTIAL BONE RESECTION performed by Trinidad Pereyra, DARSHANA at [employer] OR    NC OFFICE/OUTPT VISIT,PROCEDURE ONLY Right 8/3/2018    INCISION AND DRAINAGE MULTIPLE AREAS RIGHT FOOT WITH DEBRIDEMENT SOFT TISSUE performed by Romina Gomes DPM at [place] [address] Right     leg      Family History   Problem Relation Age of Onset    Cancer Mother     Diabetes Father     Heart Failure Father     Hypertension Father     Cancer Sister      Social History     Tobacco Use    Smoking status: Current Every Day Smoker     Packs/day: 0.50     Years: 7.00     Pack years: 3.50     Types: Cigarettes    Smokeless tobacco: Never Used   Substance Use Topics    Alcohol use: No     Frequency: Never    Drug use: No     No Known Allergies  Current Outpatient Medications on File Prior to Encounter   Medication Sig Dispense Refill    doxycycline hyclate (VIBRA-TABS) 100 MG tablet Take 1 tablet by mouth 2 times daily for 10 days 20 tablet 0    cilostazol (PLETAL) 50 MG tablet Take 1 tablet by mouth 2 times daily 60 tablet 3    metoprolol tartrate (LOPRESSOR) 25 MG tablet Take 1 tablet by mouth 2 times daily 60 tablet 3    oxyCODONE (OXYCONTIN) 10 MG extended release tablet Take 1 tablet by mouth every 12 hours for 30 days. 60 each 0    HYDROcodone-acetaminophen (NORCO) 5-325 MG per tablet Take 1 tablet by mouth every 6 hours as needed for Pain for up to 30 days.  Intended supply: 30 days 120 tablet 0    cloNIDine (CATAPRES) 0.1 MG tablet TAKE ONE TABLET BY MOUTH THREE TIMES DAILY 60 tablet 3    amLODIPine (NORVASC) 10 MG tablet TAKE ONE TABLET BY MOUTH EVERY MORNING--HOLD IF SYSTOLIC BLOOD PRESSURE IS LESS THAN 140. 30 tablet 3    DULoxetine (CYMBALTA) 60 MG extended release capsule TAKE 2 CAPSULES BY MOUTH EVERY MORNING 60 capsule 3    mirtazapine (REMERON) 15 MG tablet Take 1 tablet by mouth nightly 30 tablet 5    insulin lispro (HUMALOG) 100 UNIT/ML injection vial Inject 0-12 Units into the skin 3 times daily (with meals) 1 vial 3    gabapentin (NEURONTIN) 400 MG capsule Take 1 capsule by mouth 3 times daily for 30 days. 90 capsule 3    sodium hypochlorite (DAKINS) 0.125 % SOLN external solution Apply topically daily Moisten kerlex in dakins, than pack right aka wound 1 Bottle 1    ULTICARE INSULIN SYRINGE 31G X 5/16\" 0.3 ML MISC USE TO INJECT INSULIN FIVE TIMES A  each 3    ONETOUCH ULTRA strip USE TO TEST BLOOD SUGARS DAILY AS NEEDED 100 each 2    glucose (GLUTOSE) 40 % GEL Take 37.5 mLs by mouth as needed (hypoglycemia) 45 g 1    bisacodyl (DULCOLAX) 5 MG EC tablet Take 1 tablet by mouth daily as needed for Constipation 30 tablet 0    ammonium lactate (LAC-HYDRIN) 12 % lotion Apply topically as needed. 1 Bottle 0    mineral oil-hydrophilic petrolatum (HYDROPHOR) ointment Apply topically as needed. 1 Tube 0    naloxone 4 MG/0.1ML LIQD nasal spray 1 spray by Nasal route as needed for Opioid Reversal 1 each 5    docusate sodium (COLACE) 100 MG capsule Take 1 capsule by mouth 2 times daily (Patient taking differently: Take 100 mg by mouth 2 times daily as needed ) 50 capsule 0    Handicap Placard MISC by Does not apply route Patient cannot walk 200 ft without stopping to rest.    Expiration 10/21/2024 1 each 0     No current facility-administered medications on file prior to encounter.         REVIEW OF SYSTEMS See HPI    Objective:    BP (!) 146/78   Pulse 80   Temp 98.9 °F (37.2 °C) (Temporal)   Resp 18   Ht 6' 2\" (1.88 m)   Wt 240 lb (108.9 kg)   BMI 30.81 kg/m²   Wt Readings from Last 3 Encounters:   10/28/20 240 lb (108.9 kg)   10/21/20 240 lb (108.9 kg)   10/15/20 240 lb (108.9 kg)     PHYSICAL EXAM  CONSTITUTIONAL:   Awake, alert, cooperative   EYES:  lids and lashes normal   ENT: external ears and nose without lesions   NECK:  supple, symmetrical, trachea midline   SKIN:  Open wound Present    Assessment:     Problem List Items Addressed This Visit     Disruption of closure of muscle or muscle flap, sequela (Chronic)    Ischemic ulcer of right thigh with fat layer exposed ([facility] - Primary          Pre Debridement Measurements:  Are located in the Dexter Adalberto  Documentation Flow Sheet  Post Debridement Measurements:  Wound/Ulcer Descriptions are Pre Debridement except measurements:     Wound Buttocks Right;Upper; Inner (Active)   Number of days:        Wound 08/26/20 Other (Comment) Right;Lateral #1 right AKA site (Active)   Wound Image   10/21/20 1151   Wound Etiology Non-Healing Surgical 08/26/20 1103   Wound Cleansed Irrigated with saline 10/21/20 1215   Dressing/Treatment Alginate with Ag;ABD 10/21/20 1215   Wound Length (cm) 9.5 cm 10/28/20 1114   Wound Width (cm) 4 cm 10/28/20 1114   Wound Depth (cm) 6.4 cm 10/28/20 1114   Wound Surface Area (cm^2) 38 cm^2 10/28/20 1114   Change in Wound Size % (l*w) -38244 10/28/20 1114   Wound Volume (cm^3) 243.2 cm^3 10/28/20 1114   Wound Healing % -46160 10/28/20 1114   Post-Procedure Length (cm) 9.6 cm 10/28/20 1147   Post-Procedure Width (cm) 4 cm 10/28/20 1147   Post-Procedure Depth (cm) 6.5 cm 10/28/20 1147   Post-Procedure Surface Area (cm^2) 38.4 cm^2 10/28/20 1147   Post-Procedure Volume (cm^3) 249.6 cm^3 10/28/20 1147   Distance Tunneling (cm) 6 cm 09/30/20 1212   Tunneling Position ___ O'Clock 3 09/30/20 1212   Wound Assessment Granulation tissue;Fibrin 10/28/20 1114   Drainage Amount Copious 10/28/20 1114   Drainage Description Yellow;Serous 10/28/20 1114   Odor None 10/28/20 1114   Nadia-wound Assessment Intact 10/28/20 1114   Wound Thickness Description not for Pressure Injury Full thickness 08/26/20 1103   Number of days: 63     Incision 04/28/20 Thigh Right;Medial (Active)   Dressing/Treatment Dry dressing;ABD pad 10/15/20 1545   Drainage Amount None 10/15/20 1545   Number of days: 183   Procedure Note  Indications:  Based on my examination of this patient's wound(s)/ulcer(s) today, debridement is required to promote healing and evaluate the wound base.     Performed by: Dom Sal MD    Consent obtained:  Yes    Time out taken:  Yes    Pain Control: Anesthetic  Anesthetic: 4% Lidocaine Liquid Topical     Debridement:Excisional Debridement    Using curette the wound(s)/ulcer(s) was/were sharply debrided down through and including the removal of epidermis, dermis, subcutaneous tissue and muscle/fascia. Devitalized Tissue Debrided:  fibrin, biofilm, slough and exudate to stimulate bleeding to promote healing, post debridement good bleeding base and wound edges noted    Wound/Ulcer #: 1    Percent of Wound/Ulcer Debrided: 50%    Total Surface Area Debrided:  20 sq cm     Estimated Blood Loss:  Minimal  Hemostasis Achieved:  by pressure    Procedural Pain:  8  / 10   Post Procedural Pain:  7 / 10     Response to treatment:  With complaints of pain. Plan:   Treatment Note please see attached Discharge Instructions    Written patient dismissal instructions given to patient and signed by patient or POA. Discharge Instructions       Visit Discharge/Physician Orders     Discharge condition: Stable     Assessment of pain at discharge:yes     Anesthetic used: 4% lidocaine soln.     Discharge to: Home     Left via:Private automobile     Accompanied by: marybeth HAYES/HHA: [facility](note order change)     Dressing Orders:RIGHT AKA SITE-Cleanse with normal saline, pack loosely with calcium alginate, dry dressing and secure. Change daily.     Treatment Orders:Eat a diet high in protein and vitamin C. Take a multiple vitamin daily unless contraindicated.      [address] [place] [address],[address] followup visit: 1 week _____________________________  (Please note your next appointment above and if you are unable to keep, kindly give a 24 hour notice.  Thank you.)     Physician signature:__________________________        If you experience any of the following, please call the [address] [place] [address] Road during business hours:     * Increase in Pain  * Temperature over 101  * Increase in drainage from your wound  * Drainage with a foul odor  * Bleeding  * Increase in swelling  * Need for compression bandage changes due to slippage, breakthrough drainage.     If you need medical attention outside of the business hours of the [address] [place] [address] Street please contact your PCP or go to the nearest emergency room.                                              Electronically signed by Joaquin Napier MD on 10/28/2020 at 10:14 PM

## 2020-11-04 ENCOUNTER — HOSPITAL ENCOUNTER (OUTPATIENT)
Dept: WOUND CARE | Age: 65
Discharge: HOME | End: 2020-11-04
Payer: COMMERCIAL

## 2020-11-04 VITALS
WEIGHT: 240 LBS | HEIGHT: 74 IN | HEART RATE: 84 BPM | TEMPERATURE: 98.7 F | BODY MASS INDEX: 30.8 KG/M2 | RESPIRATION RATE: 18 BRPM | SYSTOLIC BLOOD PRESSURE: 156 MMHG | DIASTOLIC BLOOD PRESSURE: 80 MMHG

## 2020-11-04 PROCEDURE — 11043 DBRDMT MUSC&/FSCA 1ST 20/<: CPT

## 2020-11-04 PROCEDURE — 11043 DBRDMT MUSC&/FSCA 1ST 20/<: CPT | Performed by: SURGERY

## 2020-11-04 RX ORDER — LIDOCAINE HYDROCHLORIDE 40 MG/ML
SOLUTION TOPICAL ONCE
Status: DISCONTINUED | OUTPATIENT
Start: 2020-11-04 | End: 2020-11-05 | Stop reason: HOSPADM

## 2020-11-04 ASSESSMENT — PAIN DESCRIPTION - FREQUENCY: FREQUENCY: INTERMITTENT

## 2020-11-04 ASSESSMENT — PAIN DESCRIPTION - ONSET: ONSET: ON-GOING

## 2020-11-04 ASSESSMENT — PAIN DESCRIPTION - LOCATION: LOCATION: LEG

## 2020-11-04 ASSESSMENT — PAIN DESCRIPTION - PROGRESSION: CLINICAL_PROGRESSION: GRADUALLY IMPROVING

## 2020-11-04 ASSESSMENT — PAIN DESCRIPTION - PAIN TYPE: TYPE: CHRONIC PAIN

## 2020-11-04 ASSESSMENT — PAIN DESCRIPTION - ORIENTATION: ORIENTATION: RIGHT

## 2020-11-04 ASSESSMENT — PAIN SCALES - GENERAL: PAINLEVEL_OUTOF10: 3

## 2020-11-04 NOTE — PLAN OF CARE
Problem: Pain:  Goal: Pain level will decrease  Description: Pain level will decrease  Outcome: Ongoing  Goal: Control of acute pain  Description: Control of acute pain  Outcome: Ongoing  Goal: Control of chronic pain  Description: Control of chronic pain  Outcome: Ongoing     Problem: Wound:  Goal: Will show signs of wound healing; wound closure and no evidence of infection  Description: Will show signs of wound healing; wound closure and no evidence of infection  Outcome: Ongoing     Problem: Arterial:  Goal: Optimize blood flow for wound healing  Description: Optimize blood flow for wound healing  Outcome: Met This Shift     Problem: Smoking cessation:  Goal: Ability to formulate a plan to maintain a tobacco-free life will be supported  Description: Ability to formulate a plan to maintain a tobacco-free life will be supported  Outcome: Ongoing     Problem: Weight control:  Goal: Ability to maintain an optimal weight for height and age will be supported  Description: Ability to maintain an optimal weight for height and age will be supported  Outcome: Ongoing     Problem: Falls - Risk of:  Goal: Will remain free from falls  Description: Will remain free from falls  Outcome: Met This Shift

## 2020-11-10 RX ORDER — CLONIDINE HYDROCHLORIDE 0.1 MG/1
TABLET ORAL
Qty: 60 TABLET | Refills: 3 | Status: SHIPPED
Start: 2020-11-10 | End: 2020-11-19 | Stop reason: SDUPTHER

## 2020-11-10 RX ORDER — OXYCODONE HCL 10 MG/1
10 TABLET, FILM COATED, EXTENDED RELEASE ORAL EVERY 12 HOURS SCHEDULED
Qty: 60 EACH | Refills: 0 | Status: SHIPPED | OUTPATIENT
Start: 2020-11-10 | End: 2020-12-10

## 2020-11-10 RX ORDER — OXYCODONE HYDROCHLORIDE 5 MG/1
5 TABLET ORAL EVERY 4 HOURS PRN
COMMUNITY
End: 2020-11-10 | Stop reason: SDUPTHER

## 2020-11-10 RX ORDER — OXYCODONE HYDROCHLORIDE 5 MG/1
5 TABLET ORAL EVERY 6 HOURS PRN
Qty: 120 TABLET | Refills: 0 | Status: SHIPPED | OUTPATIENT
Start: 2020-11-10 | End: 2020-12-10

## 2020-11-10 RX ORDER — GABAPENTIN 400 MG/1
400 CAPSULE ORAL 3 TIMES DAILY
Qty: 90 CAPSULE | Refills: 3 | Status: SHIPPED | OUTPATIENT
Start: 2020-11-10 | End: 2020-12-04 | Stop reason: SDUPTHER

## 2020-11-11 NOTE — PROGRESS NOTES
Wound Healing Center Followup Visit Note    Referring Physician : Nohelia Sifuentes [address] RECORD NUMBER:  [id number]  AGE: 60 y.o. GENDER: male  : 1955  EPISODE DATE:  2020    Subjective:     Chief Complaint   Patient presents with    Wound Check     right amp wound      HISTORY of PRESENT ILLNESS RONNY Tapia is a 60 y.o. male who presents today in regards to follow up evaluation and treatment of wound/ulcer. That patient's past medical, family and social hx were reviewed and changes were made if present. History of Wound Context:  Pt presents in regards to chronic R above knee amputation wound was since 2020. He had developed postoperatively dehiscence of his wound site which had previously undergone a muscle flap. He was having a packed per home health care. The wound had gotten so small that it was difficult to pack. He is still having drainage significant though. At first visit to wound healing center 20 his wound has not been improving. He was not on abx at initial visit to center.     Previous lower extremity procedures  2018  R AKA for chronic R LE wound   3/20/2020 R groin exploration with iliofemoral artery embolectomy, primary closure of arteriotomy, R deep femoral atery embolectomy   2020 Excisional debridement of infected necrotic  tissue, right groin and right thigh   2020 RIGHT THIGH WOUND DRESSING CHANGE and DEBRIDEMENT   2020 Irrigation and excisional debridement of Right Thigh above knee amputation wound   2020 Irrigation and excisional debridement of the Right thigh above knee amputation wound 25 x45 cm  Excision of prosthetic R LE bypass graft  Ligation of right SFA  Posterior hamstring myocutaneous flap 65 cm flap for open wound of 45 x 25 cm per Dr. Rocha Marking     20  · Wound itself is small - opened up as difficult to pack  · 5 cm depth - culture done, significant amount of fluid expressed once opened up  · aquacell ag packing  · Discussed potential need for further surgical exploration in future - pt would like to avoid hospital and OR if possible  9/2/20  · Wound much improved - depth less  · Start doxycyline for staph culture  9/9/20  · Depth 5 cm again, fluid seems less  9/23/20  · Tunneling now 8 cm, concern that hittig bone  · Discussed with patient continuing current reigmen unlikely to result in healing  · Would benefit from further surgical debridement, likely revision of amputation site and possible wound vac  · Patient would like to consider options and will let me know next week  9/30/20  · Significant tunneling still  · Will proceed with or in 2 weeks per pt request  10/21/20  · OR 10/14/20  · dakins moistened kerlex  · Discussed with Dr. Jyotsna French re pain control issues  · Doxycycline 10 days script given  10/28/20  · Pain better controlled  · + Granulation tissue  · Bone less exposed  11/4/20  · Wound health  · Bone still exposed    Wound/Ulcer Pain Timing/Severity: moderate  Quality of pain: aching, throbbing, shooting   Severity:  6 / 10   Modifying Factors: Pain worsens with dressing changes, pressure, debridement  Associated Signs/Symptoms: drainage and pain    Ulcer Identification:  Ulcer Type: arterial, diabetic, pressure and non-healing surgical  Contributing Factors: edema, diabetes, poor glucose control, chronic pressure, decreased mobility, shear force, obesity and arterial insufficiency    Diabetic/Pressure/Non Pressure Ulcers only:  Ulcer: Diabetic ulcer, fat layer exposed    Wound: Wound dehiscence        PAST MEDICAL HISTORY      Diagnosis Date    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 ([address] 5/22/2018    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 ([address] 5/21/2018    Critical lower limb ischemia 3/20/2020    Diabetes mellitus ([address]     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 2 diabetes mellitus, with fat layer exposed ([facility] 5/22/2018    Disruption of closure of muscle or muscle flap, sequela 8/26/2020    DVT, lower extremity (HCC)     right leg     Gas gangrene of thigh ([facility] 3/20/2020    Hyperlipidemia     Hypertension     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[facility]     PVD (peripheral vascular disease) ([facility]      Past Surgical History:   Procedure Laterality Date    AMPUTATION ABOVE KNEE Right 4/28/2020    DEBRIDEMENT OF AMPUTATION RIGHT ABOVE KNEE performed by CAMDEN GUTIERREZ MD at [address] [facility] Right 4/30/2020    DEBRIDEMENT OF AMPUTATION RIGHT ABOVE KNEE,  POSS. ABOVE KNEE REVISION, POSS. CLOSURE, POSS.WOUND VAC -- BEN Nicholas / Fide April TO LOOK IN [facility] at [address] Right 3/20/2020    RIGHT LOWER EXTREMITY THROMBECTOMY, POSSIBLE ANGIOGRAM, POSSIBLE INTERVENTION, POSSIBLE BYPASS. performed by CAMDEN GUTIERREZ MD at [place] [address] Right 4/17/2020    RIGHT LEG DEBRIDEMENT INCISION AND DRAINAGE performed by Kole Slade MD at [place] [address] Right 4/20/2020    RIGHT THIGH WOUND DRESSING CHANGE; DEBRIDEMENT, removal of muscle performed by Kole Slade MD at [place] [address] Right 4/21/2020    RIGHT THIGH WOUND DRESSING CHANGE POSSIBLE  DEBRIDEMENT - NEEDS BEN [place] / JANETTE TO SEE performed by Amira Rey MD at [place] [address] Right 4/23/2020    RIGHT THIGH WOUND DRESSING CHANGE and DEBRIDEMENT performed by CAMDEN GUTIERREZ MD at [place] [address] Right 10/15/2020    DEBRIDEMENT RIGHT ABOVE KNEE AMPUTATION POSS.  REVISION POSS. WOUND VAC performed by CAMDEN GUTIERREZ MD at [address] [facility] Right 11/1/2018    AMPUTATION ABOVE KNEE RIGHT LEG performed by CAMDEN GUTEIRREZ MD at [facility] Right 5/24/2018    RIGHT FOOT INCISION AND DRAINAGE WITH PARTIAL (LAC-HYDRIN) 12 % lotion Apply topically as needed. 1 Bottle 0    mineral oil-hydrophilic petrolatum (HYDROPHOR) ointment Apply topically as needed. 1 Tube 0    naloxone 4 MG/0.1ML LIQD nasal spray 1 spray by Nasal route as needed for Opioid Reversal 1 each 5    docusate sodium (COLACE) 100 MG capsule Take 1 capsule by mouth 2 times daily (Patient taking differently: Take 100 mg by mouth 2 times daily as needed ) 50 capsule 0    Handicap Placard MISC by Does not apply route Patient cannot walk 200 ft without stopping to rest.    Expiration 10/21/2024 1 each 0    glucose (GLUTOSE) 40 % GEL Take 37.5 mLs by mouth as needed (hypoglycemia) 45 g 1     No current facility-administered medications on file prior to encounter. REVIEW OF SYSTEMS See HPI    Objective:    BP (!) 156/80   Pulse 84   Temp 98.7 °F (37.1 °C) (Temporal)   Resp 18   Ht 6' 2\" (1.88 m)   Wt 240 lb (108.9 kg)   BMI 30.81 kg/m²   Wt Readings from Last 3 Encounters:   11/04/20 240 lb (108.9 kg)   10/28/20 240 lb (108.9 kg)   10/21/20 240 lb (108.9 kg)     PHYSICAL EXAM  CONSTITUTIONAL:   Awake, alert, cooperative   EYES:  lids and lashes normal   ENT: external ears and nose without lesions   NECK:  supple, symmetrical, trachea midline   SKIN:  Open wound Present    Assessment:     Problem List Items Addressed This Visit     Disruption of closure of muscle or muscle flap, sequela (Chronic)          Pre Debridement Measurements:  Are located in the [place]  Documentation Flow Sheet  Post Debridement Measurements:  Wound/Ulcer Descriptions are Pre Debridement except measurements:     Wound Buttocks Right;Upper; Inner (Active)   Number of days:        Wound 08/26/20 Other (Comment) Right;Lateral #1 right AKA site (Active)   Wound Image   10/21/20 1151   Wound Etiology Non-Healing Surgical 08/26/20 1103   Wound Cleansed Cleansed with saline 11/04/20 1214   Dressing/Treatment Alginate;ABD 11/04/20 1214   Wound Length (cm) 7 cm 11/04/20 1140 Wound Width (cm) 3.6 cm 11/04/20 1140   Wound Depth (cm) 6.7 cm 11/04/20 1140   Wound Surface Area (cm^2) 25.2 cm^2 11/04/20 1140   Change in Wound Size % (l*w) -16379 11/04/20 1140   Wound Volume (cm^3) 168.84 cm^3 11/04/20 1140   Wound Healing % -[id number] 11/04/20 1140   Post-Procedure Length (cm) 7.3 cm 11/04/20 1206   Post-Procedure Width (cm) 3.7 cm 11/04/20 1206   Post-Procedure Depth (cm) 6.7 cm 11/04/20 1206   Post-Procedure Surface Area (cm^2) 27.01 cm^2 11/04/20 1206   Post-Procedure Volume (cm^3) 180.97 cm^3 11/04/20 1206   Distance Tunneling (cm) 6 cm 09/30/20 1212   Tunneling Position ___ O'Clock 3 09/30/20 1212   Wound Assessment Fibrin;Granulation tissue;Slough 11/04/20 1140   Drainage Amount Copious 11/04/20 1140   Drainage Description Yellow; Thick; Serosanguinous 11/04/20 1140   Odor None 11/04/20 1140   Nadia-wound Assessment Intact 11/04/20 1140   Wound Thickness Description not for Pressure Injury Full thickness 08/26/20 1103   Number of days: 76     Incision 04/28/20 Thigh Right;Medial (Active)   Dressing/Treatment Dry dressing;ABD pad 10/15/20 1545   Drainage Amount None 10/15/20 1545   Number of days: 197   Procedure Note  Indications:  Based on my examination of this patient's wound(s)/ulcer(s) today, debridement is required to promote healing and evaluate the wound base. Performed by: Dioni Mena MD    Consent obtained:  Yes    Time out taken:  Yes    Pain Control: Anesthetic  Anesthetic: 4% Lidocaine Liquid Topical     Debridement:Excisional Debridement    Using curette the wound(s)/ulcer(s) was/were sharply debrided down through and including the removal of epidermis, dermis, subcutaneous tissue and muscle/fascia.         Devitalized Tissue Debrided:  fibrin, biofilm, slough and exudate to stimulate bleeding to promote healing, post debridement good bleeding base and wound edges noted    Wound/Ulcer #: 1    Percent of Wound/Ulcer Debrided: 80%    Total Surface Area Debrided:  20 sq cm     Estimated Blood Loss:  Minimal  Hemostasis Achieved:  by pressure    Procedural Pain:  8  / 10   Post Procedural Pain:  7 / 10     Response to treatment:  With complaints of pain. Plan:   Treatment Note please see attached Discharge Instructions    Written patient dismissal instructions given to patient and signed by patient or POA. Discharge Instructions       Visit Discharge/Physician Orders     Discharge condition: Stable     Assessment of pain at discharge:yes     Anesthetic used: 4% lidocaine soln.     Discharge to: Home     Left via:Private automobile     Accompanied by: marybeth HAYES/HHA: [facility]     Dressing Orders:RIGHT AKA SITE-Cleanse with normal saline, pack loosely with calcium alginate, dry dressing and secure. Change daily.     Treatment Orders:Eat a diet high in protein and vitamin C. Take a multiple vitamin daily unless contraindicated.      [address] [place] [address],[address] followup visit: 1 week _____________________________  (Please note your next appointment above and if you are unable to keep, kindly give a 24 hour notice.  Thank you.)     Physician signature:__________________________        If you experience any of the following, please call the [address] [place] [address] [email or web address] during business hours:     * Increase in Pain  * Temperature over 101  * Increase in drainage from your wound  * Drainage with a foul odor  * Bleeding  * Increase in swelling  * Need for compression bandage changes due to slippage, breakthrough drainage.     If you need medical attention outside of the business hours of the [address] please contact your PCP or go to the nearest emergency room.                                                         Electronically signed by Lauri Akins MD on 11/11/2020 at 9:11 AM

## 2020-11-13 ENCOUNTER — OFFICE VISIT (OUTPATIENT)
Dept: PRIMARY CARE CLINIC | Age: 65
End: 2020-11-13
Payer: COMMERCIAL

## 2020-11-13 VITALS
RESPIRATION RATE: 16 BRPM | SYSTOLIC BLOOD PRESSURE: 122 MMHG | BODY MASS INDEX: 30.81 KG/M2 | TEMPERATURE: 98.2 F | DIASTOLIC BLOOD PRESSURE: 64 MMHG | HEART RATE: 60 BPM | HEIGHT: 74 IN | OXYGEN SATURATION: 98 %

## 2020-11-13 PROCEDURE — G8417 CALC BMI ABV UP PARAM F/U: HCPCS | Performed by: FAMILY MEDICINE

## 2020-11-13 PROCEDURE — G8427 DOCREV CUR MEDS BY ELIG CLIN: HCPCS | Performed by: FAMILY MEDICINE

## 2020-11-13 PROCEDURE — 2022F DILAT RTA XM EVC RTNOPTHY: CPT | Performed by: FAMILY MEDICINE

## 2020-11-13 PROCEDURE — 3017F COLORECTAL CA SCREEN DOC REV: CPT | Performed by: FAMILY MEDICINE

## 2020-11-13 PROCEDURE — 3044F HG A1C LEVEL LT 7.0%: CPT | Performed by: FAMILY MEDICINE

## 2020-11-13 PROCEDURE — 99213 OFFICE O/P EST LOW 20 MIN: CPT | Performed by: FAMILY MEDICINE

## 2020-11-13 PROCEDURE — 4004F PT TOBACCO SCREEN RCVD TLK: CPT | Performed by: FAMILY MEDICINE

## 2020-11-13 PROCEDURE — G8482 FLU IMMUNIZE ORDER/ADMIN: HCPCS | Performed by: FAMILY MEDICINE

## 2020-11-13 ASSESSMENT — ENCOUNTER SYMPTOMS
SHORTNESS OF BREATH: 0
PHOTOPHOBIA: 0
BLOOD IN STOOL: 0
DIARRHEA: 0
CONSTIPATION: 1
BACK PAIN: 0

## 2020-11-13 NOTE — PROGRESS NOTES
2020     Joan Pearson (:  1955) is a 60 y.o. male, here for evaluation of the following medical concerns:    HPI  Patient is here to follow-up on chronic issues and for medication refills. Patient states he has been doing well in regards to his pain and diabetes. A1c today is 6.5. He is unfortunately scheduled to go back into the hospital for revision surgery of his right AKA on the  of this month. Current plan is to keep the patient for 7 to 14 days based on clinical response and healing. Patient denies any other issues currently. Update 2020  Patient is here to follow-up postoperatively. Patient had stump wound revision by vascular surgery. They attempted to have him stay in the hospital postoperatively but he wanted to be discharged. He was sent home and put on 10 days of doxycycline for MRSA in the surgical culture. Also grew anaerobic bacteria but no culture and sensitivity was performed. Patient states he is doing well postoperatively. Follows with wound care/vascular surgery weekly. Denies any acute issues at this time. Still attempting to find a place to give him a low pressure loss bed. No other issues. Review of Systems   Constitutional: Positive for fatigue. HENT: Negative for hearing loss and nosebleeds. Eyes: Negative for photophobia. Respiratory: Negative for shortness of breath. Cardiovascular: Negative for palpitations and leg swelling. Gastrointestinal: Positive for constipation. Negative for blood in stool and diarrhea. Endocrine: Negative for polydipsia. Genitourinary: Negative for dysuria, frequency, hematuria and urgency. Musculoskeletal: Positive for arthralgias, gait problem, joint swelling and myalgias. Negative for back pain. Right Sided above-the-knee amputation revised multiple times until it is  just distal to the hip joint   Skin: Negative. Neurological: Positive for numbness.  Negative for dizziness and Expiration 10/21/2024 Yes Juanito Kline DO   cilostazol (PLETAL) 50 MG tablet Take 1 tablet by mouth 2 times daily  Juanito Kline DO   ULTICARE INSULIN SYRINGE 31G X 5/16\" 0.3 ML MISC USE TO INJECT INSULIN FIVE TIMES A DAY  Juanito Kline DO   glucose (GLUTOSE) 40 % GEL Take 37.5 mLs by mouth as needed (hypoglycemia)  Patient not taking: Reported on 11/13/2020  Johan Parikh MD   bisacodyl (DULCOLAX) 5 MG EC tablet Take 1 tablet by mouth daily as needed for Constipation  Patient not taking: Reported on 11/13/2020  Johan Parikh MD   mineral oil-hydrophilic petrolatum (HYDROPHOR) ointment Apply topically as needed. Patient not taking: Reported on 11/13/2020  Johan Parikh MD   naloxone 4 MG/0.1ML LIQD nasal spray 1 spray by Nasal route as needed for Opioid Reversal  Gloria Lee MD   docusate sodium (COLACE) 100 MG capsule Take 1 capsule by mouth 2 times daily  Patient not taking: Reported on 11/13/2020  Joey Hdz MD        Social History     Tobacco Use    Smoking status: Current Every Day Smoker     Packs/day: 0.50     Years: 7.00     Pack years: 3.50     Types: Cigarettes    Smokeless tobacco: Never Used   Substance Use Topics    Alcohol use: No     Frequency: Never        Vitals:    11/13/20 1346   BP: 122/64   Pulse: 60   Resp: 16   Temp: 98.2 °F (36.8 °C)   SpO2: 98%   Height: 6' 2\" (1.88 m)     Estimated body mass index is 30.81 kg/m² as calculated from the following:    Height as of this encounter: 6' 2\" (1.88 m). Weight as of 11/4/20: 240 lb (108.9 kg). Physical Exam  HENT:      Head: Normocephalic and atraumatic. Mouth/Throat:      Dentition: Abnormal dentition (edentulous upper). Eyes:      General: No scleral icterus. Conjunctiva/sclera: Conjunctivae normal.      Pupils: Pupils are equal, round, and reactive to light. Neck:      Musculoskeletal: Neck supple. Thyroid: No thyromegaly. Cardiovascular:      Rate and Rhythm: Normal rate and regular rhythm. Heart sounds: Normal heart sounds. No murmur. Pulmonary:      Effort: Pulmonary effort is normal.      Breath sounds: Normal breath sounds. No rales. Abdominal:      General: Bowel sounds are decreased. There is no distension. Palpations: Abdomen is soft. Tenderness: There is no abdominal tenderness. Musculoskeletal: Normal range of motion. Legs:    Lymphadenopathy:      Cervical: No cervical adenopathy. Skin:     General: Skin is warm and dry. Findings: No erythema or rash. Neurological:      Mental Status: He is alert and oriented to person, place, and time. Cranial Nerves: No cranial nerve deficit. Psychiatric:         Judgment: Judgment normal.         Assessment/Plan:   Diagnosis Orders   1. Above knee amputation of right lower extremity ([address]     2. Controlled type 2 diabetes mellitus with other specified complication, with long-term current use of insulin ([address]     3. Hypertension, unspecified type     4. Ulceration of stump of above knee amputation of right lower extremity with fat layer exposed ([address]       Continue with current medication regimen. Follow with vascular surgery as directed, RTC in 6-8 weeks. Sekou Landis D.O.   2:18 PM  11/13/2020       This document may have been prepared at least partially through the use of voice recognition software. Although effort is taken to assure the accuracy of this document, it is possible that grammatical, syntax,  or spelling errors may occur.

## 2020-11-18 ENCOUNTER — HOSPITAL ENCOUNTER (OUTPATIENT)
Dept: WOUND CARE | Age: 65
Discharge: HOME | End: 2020-11-18
Payer: COMMERCIAL

## 2020-11-18 VITALS
BODY MASS INDEX: 30.8 KG/M2 | TEMPERATURE: 97.6 F | HEIGHT: 74 IN | WEIGHT: 240 LBS | SYSTOLIC BLOOD PRESSURE: 158 MMHG | DIASTOLIC BLOOD PRESSURE: 76 MMHG

## 2020-11-18 PROCEDURE — 11042 DBRDMT SUBQ TIS 1ST 20SQCM/<: CPT | Performed by: SURGERY

## 2020-11-18 PROCEDURE — 11042 DBRDMT SUBQ TIS 1ST 20SQCM/<: CPT

## 2020-11-18 RX ORDER — LIDOCAINE HYDROCHLORIDE 40 MG/ML
SOLUTION TOPICAL ONCE
Status: DISCONTINUED | OUTPATIENT
Start: 2020-11-18 | End: 2020-11-19 | Stop reason: HOSPADM

## 2020-11-18 ASSESSMENT — PAIN DESCRIPTION - LOCATION: LOCATION: LEG

## 2020-11-18 ASSESSMENT — PAIN DESCRIPTION - FREQUENCY: FREQUENCY: CONTINUOUS

## 2020-11-18 ASSESSMENT — PAIN - FUNCTIONAL ASSESSMENT: PAIN_FUNCTIONAL_ASSESSMENT: ACTIVITIES ARE NOT PREVENTED

## 2020-11-18 ASSESSMENT — PAIN DESCRIPTION - PAIN TYPE: TYPE: CHRONIC PAIN

## 2020-11-18 ASSESSMENT — PAIN SCALES - GENERAL: PAINLEVEL_OUTOF10: 6

## 2020-11-18 ASSESSMENT — PAIN DESCRIPTION - ORIENTATION: ORIENTATION: RIGHT

## 2020-11-18 ASSESSMENT — PAIN DESCRIPTION - PROGRESSION: CLINICAL_PROGRESSION: NOT CHANGED

## 2020-11-18 ASSESSMENT — PAIN DESCRIPTION - ONSET: ONSET: ON-GOING

## 2020-11-18 ASSESSMENT — PAIN DESCRIPTION - DESCRIPTORS: DESCRIPTORS: STABBING;BURNING

## 2020-11-18 NOTE — PLAN OF CARE
Problem: Pain:  Goal: Pain level will decrease  Description: Pain level will decrease  Outcome: Ongoing  Goal: Control of acute pain  Description: Control of acute pain  Outcome: Ongoing  Goal: Control of chronic pain  Description: Control of chronic pain  Outcome: Ongoing     Problem: Wound:  Goal: Will show signs of wound healing; wound closure and no evidence of infection  Description: Will show signs of wound healing; wound closure and no evidence of infection  Outcome: Ongoing     Problem: Arterial:  Goal: Optimize blood flow for wound healing  Description: Optimize blood flow for wound healing  Outcome: Met This Shift     Problem: Smoking cessation:  Goal: Ability to formulate a plan to maintain a tobacco-free life will be supported  Description: Ability to formulate a plan to maintain a tobacco-free life will be supported  Outcome: Ongoing     Problem: Weight control:  Goal: Ability to maintain an optimal weight for height and age will be supported  Description: Ability to maintain an optimal weight for height and age will be supported  Outcome: Ongoing     Problem: Falls - Risk of:  Goal: Will remain free from falls  Description: Will remain free from falls  Outcome: Met This Shift     Problem: Blood Glucose:  Goal: Ability to maintain appropriate glucose levels will improve  Description: Ability to maintain appropriate glucose levels will improve  Outcome: Met This Shift

## 2020-11-18 NOTE — PROGRESS NOTES
Wound Healing Center Followup Visit Note    Referring Physician : Nohelia Norris Str RECORD NUMBER:  [id number]  AGE: 60 y.o. GENDER: male  : 1955  EPISODE DATE:  2020    Subjective:     Chief Complaint   Patient presents with    Wound Check     right amp site      HISTORY of PRESENT ILLNESS HPI   Wendy Cesar is a 60 y.o. male who presents today in regards to follow up evaluation and treatment of wound/ulcer. That patient's past medical, family and social hx were reviewed and changes were made if present. History of Wound Context:  Pt presents in regards to chronic R above knee amputation wound was since 2020. He had developed postoperatively dehiscence of his wound site which had previously undergone a muscle flap. He was having a packed per home health care. The wound had gotten so small that it was difficult to pack. He is still having drainage significant though. At first visit to wound healing center 20 his wound has not been improving. He was not on abx at initial visit to center.     Previous lower extremity procedures  2018  R AKA for chronic R LE wound   3/20/2020 R groin exploration with iliofemoral artery embolectomy, primary closure of arteriotomy, R deep femoral atery embolectomy   2020 Excisional debridement of infected necrotic  tissue, right groin and right thigh   2020 RIGHT THIGH WOUND DRESSING CHANGE and DEBRIDEMENT   2020 Irrigation and excisional debridement of Right Thigh above knee amputation wound   2020 Irrigation and excisional debridement of the Right thigh above knee amputation wound 25 x45 cm  Excision of prosthetic R LE bypass graft  Ligation of right SFA  Posterior hamstring myocutaneous flap 65 cm flap for open wound of 45 x 25 cm per Dr. Cristina Staples     20  · Wound itself is small - opened up as difficult to pack  · 5 cm depth - culture done, significant amount of fluid expressed once opened up  · aquacell ag packing  · Discussed potential need for further surgical exploration in future - pt would like to avoid hospital and OR if possible  9/2/20  · Wound much improved - depth less  · Start doxycyline for staph culture  9/9/20  · Depth 5 cm again, fluid seems less  9/23/20  · Tunneling now 8 cm, concern that hittig bone  · Discussed with patient continuing current reigmen unlikely to result in healing  · Would benefit from further surgical debridement, likely revision of amputation site and possible wound vac  · Patient would like to consider options and will let me know next week  9/30/20  · Significant tunneling still  · Will proceed with or in 2 weeks per pt request  10/21/20  · OR 10/14/20  · dakins moistened kerlex  · Discussed with Dr. Lizabeth James re pain control issues  · Doxycycline 10 days script given  10/28/20  · Pain better controlled  · + Granulation tissue  · Bone less exposed  11/4/20  · Wound health  · Bone still exposed  11/18/20  · Wound continues to be improved  · Less bone exposed     Wound/Ulcer Pain Timing/Severity: moderate  Quality of pain: aching, throbbing, shooting   Severity:  4 / 10   Modifying Factors: Pain worsens with dressing changes, pressure, debridement  Associated Signs/Symptoms: drainage and pain    Ulcer Identification:  Ulcer Type: arterial, diabetic, pressure and non-healing surgical  Contributing Factors: edema, diabetes, poor glucose control, chronic pressure, decreased mobility, shear force, obesity and arterial insufficiency    Diabetic/Pressure/Non Pressure Ulcers only:  Ulcer: Diabetic ulcer, fat layer exposed    Wound: Wound dehiscence        PAST MEDICAL HISTORY      Diagnosis Date    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 ([facility] [address] 5/22/2018    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 ([address] 5/21/2018    Critical lower limb ischemia 3/20/2020    Diabetes mellitus ([address]     Diabetic ulcer the skin 3 times daily (with meals) 1 vial 3    bisacodyl (DULCOLAX) 5 MG EC tablet Take 1 tablet by mouth daily as needed for Constipation 30 tablet 0    docusate sodium (COLACE) 100 MG capsule Take 1 capsule by mouth 2 times daily 50 capsule 0    ULTICARE INSULIN SYRINGE 31G X 5/16\" 0.3 ML MISC USE TO INJECT INSULIN FIVE TIMES A  each 3    ONETOUCH ULTRA strip USE TO TEST BLOOD SUGARS DAILY AS NEEDED 100 each 2    glucose (GLUTOSE) 40 % GEL Take 37.5 mLs by mouth as needed (hypoglycemia) (Patient not taking: Reported on 11/13/2020) 45 g 1    ammonium lactate (LAC-HYDRIN) 12 % lotion Apply topically as needed. 1 Bottle 0    mineral oil-hydrophilic petrolatum (HYDROPHOR) ointment Apply topically as needed. (Patient not taking: Reported on 11/13/2020) 1 Tube 0    naloxone 4 MG/0.1ML LIQD nasal spray 1 spray by Nasal route as needed for Opioid Reversal 1 each 5    Handicap Placard MISC by Does not apply route Patient cannot walk 200 ft without stopping to rest.    Expiration 10/21/2024 1 each 0     No current facility-administered medications on file prior to encounter.         REVIEW OF SYSTEMS See HPI    Objective:    BP (!) 158/76   Temp 97.6 °F (36.4 °C) (Temporal)   Ht 6' 2\" (1.88 m)   Wt 240 lb (108.9 kg)   BMI 30.81 kg/m²   Wt Readings from Last 3 Encounters:   11/18/20 240 lb (108.9 kg)   11/04/20 240 lb (108.9 kg)   10/28/20 240 lb (108.9 kg)     PHYSICAL EXAM  CONSTITUTIONAL:   Awake, alert, cooperative   EYES:  lids and lashes normal   ENT: external ears and nose without lesions   NECK:  supple, symmetrical, trachea midline   SKIN:  Open wound Present    Assessment:     Problem List Items Addressed This Visit     Disruption of closure of muscle or muscle flap, sequela (Chronic)    Ischemic ulcer of right thigh with fat layer exposed ([facility] [address] - Primary          Pre Debridement Measurements:  Are located in the [place]  Documentation Flow Sheet  Post Debridement Measurements:  Wound/Ulcer Descriptions are Pre Debridement except measurements:     Wound Buttocks Right;Upper; Inner (Active)   Number of days:        Wound 08/26/20 Other (Comment) Right;Lateral #1 right AKA site (Active)   Wound Image   11/18/20 1201   Wound Etiology Non-Healing Surgical 08/26/20 1103   Wound Cleansed Cleansed with saline 11/18/20 1214   Dressing/Treatment Alginate;ABD 11/18/20 1214   Wound Length (cm) 3.5 cm 11/18/20 1201   Wound Width (cm) 2.3 cm 11/18/20 1201   Wound Depth (cm) 5.3 cm 11/18/20 1201   Wound Surface Area (cm^2) 8.05 cm^2 11/18/20 1201   Change in Wound Size % (l*w) -9962.5 11/18/20 1201   Wound Volume (cm^3) 42.66 cm^3 11/18/20 1201   Wound Healing % -48661 11/18/20 1201   Post-Procedure Length (cm) 3.5 cm 11/18/20 1210   Post-Procedure Width (cm) 2.5 cm 11/18/20 1210   Post-Procedure Depth (cm) 5.6 cm 11/18/20 1210   Post-Procedure Surface Area (cm^2) 8.75 cm^2 11/18/20 1210   Post-Procedure Volume (cm^3) 49 cm^3 11/18/20 1210   Distance Tunneling (cm) 6 cm 09/30/20 1212   Tunneling Position ___ O'Clock 3 09/30/20 1212   Wound Assessment Granulation tissue 11/18/20 1201   Drainage Amount Large 11/18/20 1201   Drainage Description Yellow;Brown 11/18/20 1201   Odor None 11/18/20 1201   Nadia-wound Assessment Intact 11/18/20 1201   Wound Thickness Description not for Pressure Injury Full thickness 08/26/20 1103   Number of days: 84     Incision 04/28/20 Thigh Right;Medial (Active)   Number of days: 204   Procedure Note  Indications:  Based on my examination of this patient's wound(s)/ulcer(s) today, debridement is required to promote healing and evaluate the wound base.     Performed by: Pablo Mckenzie MD    Consent obtained:  Yes    Time out taken:  Yes    Pain Control: Anesthetic  Anesthetic: 4% Lidocaine Liquid Topical     Debridement:Excisional Debridement    Using curette the wound(s)/ulcer(s) was/were sharply debrided down through and including the removal of epidermis, dermis, subcutaneous

## 2020-11-19 RX ORDER — CLONIDINE HYDROCHLORIDE 0.1 MG/1
TABLET ORAL
Qty: 90 TABLET | Refills: 5 | Status: SHIPPED | OUTPATIENT
Start: 2020-11-19 | End: 2020-12-04 | Stop reason: SDUPTHER

## 2020-11-25 ENCOUNTER — HOSPITAL ENCOUNTER (OUTPATIENT)
Dept: WOUND CARE | Age: 65
Discharge: HOME | End: 2020-11-25
Payer: COMMERCIAL

## 2020-11-25 ENCOUNTER — TELEPHONE (OUTPATIENT)
Dept: VASCULAR SURGERY | Age: 65
End: 2020-11-25

## 2020-11-25 ENCOUNTER — TELEPHONE (OUTPATIENT)
Dept: PRIMARY CARE CLINIC | Age: 65
End: 2020-11-25

## 2020-11-25 VITALS
SYSTOLIC BLOOD PRESSURE: 122 MMHG | RESPIRATION RATE: 18 BRPM | TEMPERATURE: 97.8 F | DIASTOLIC BLOOD PRESSURE: 78 MMHG | WEIGHT: 240 LBS | HEIGHT: 74 IN | BODY MASS INDEX: 30.8 KG/M2 | HEART RATE: 88 BPM

## 2020-11-25 PROCEDURE — 11043 DBRDMT MUSC&/FSCA 1ST 20/<: CPT | Performed by: SURGERY

## 2020-11-25 PROCEDURE — 11043 DBRDMT MUSC&/FSCA 1ST 20/<: CPT

## 2020-11-25 RX ORDER — LIDOCAINE HYDROCHLORIDE 40 MG/ML
SOLUTION TOPICAL ONCE
Status: DISCONTINUED | OUTPATIENT
Start: 2020-11-25 | End: 2020-11-26 | Stop reason: HOSPADM

## 2020-11-25 NOTE — PROGRESS NOTES
Wound Healing Center Followup Visit Note    Referring Physician : Nohelia Norris Str RECORD NUMBER:  [id number]  AGE: 60 y.o. GENDER: male  : 1955  EPISODE DATE:  2020    Subjective:     Chief Complaint   Patient presents with    Wound Check     right amp site      HISTORY of PRESENT ILLNESS HPI   Lazara Mendes is a 60 y.o. male who presents today in regards to follow up evaluation and treatment of wound/ulcer. That patient's past medical, family and social hx were reviewed and changes were made if present. History of Wound Context:  Pt presents in regards to chronic R above knee amputation wound was since 2020. He had developed postoperatively dehiscence of his wound site which had previously undergone a muscle flap. He was having a packed per home health care. The wound had gotten so small that it was difficult to pack. He is still having drainage significant though. At first visit to wound healing center 20 his wound has not been improving. He was not on abx at initial visit to center.     Previous lower extremity procedures  2018  R AKA for chronic R LE wound   3/20/2020 R groin exploration with iliofemoral artery embolectomy, primary closure of arteriotomy, R deep femoral atery embolectomy   2020 Excisional debridement of infected necrotic  tissue, right groin and right thigh   2020 RIGHT THIGH WOUND DRESSING CHANGE and DEBRIDEMENT   2020 Irrigation and excisional debridement of Right Thigh above knee amputation wound   2020 Irrigation and excisional debridement of the Right thigh above knee amputation wound 25 x45 cm  Excision of prosthetic R LE bypass graft  Ligation of right SFA  Posterior hamstring myocutaneous flap 65 cm flap for open wound of 45 x 25 cm per Dr. Jaki Magaña     20  · Wound itself is small - opened up as difficult to pack  · 5 cm depth - culture done, significant amount of fluid expressed once opened up  · aquacell ag packing  · Discussed potential need for further surgical exploration in future - pt would like to avoid hospital and OR if possible  9/2/20  · Wound much improved - depth less  · Start doxycyline for staph culture  9/9/20  · Depth 5 cm again, fluid seems less  9/23/20  · Tunneling now 8 cm, concern that hittig bone  · Discussed with patient continuing current reigmen unlikely to result in healing  · Would benefit from further surgical debridement, likely revision of amputation site and possible wound vac  · Patient would like to consider options and will let me know next week  9/30/20  · Significant tunneling still  · Will proceed with or in 2 weeks per pt request  10/21/20  · OR 10/14/20  · dakins moistened kerlex  · Discussed with Dr. Mildred Castleman re pain control issues  · Doxycycline 10 days script given  10/28/20  · Pain better controlled  · + Granulation tissue  · Bone less exposed  11/4/20  · Wound health  · Bone still exposed  11/18/20  · Wound continues to be improved  · Less bone exposed  11/25/20  · Bone exposure still an issue  · Will take for surgery - try dermagraft  · If not successful will likely need revision in future    Wound/Ulcer Pain Timing/Severity: moderate  Quality of pain: aching, throbbing, shooting   Severity:  4 / 10   Modifying Factors: Pain worsens with dressing changes, pressure, debridement  Associated Signs/Symptoms: drainage and pain    Ulcer Identification:  Ulcer Type: arterial, diabetic, pressure and non-healing surgical  Contributing Factors: edema, diabetes, poor glucose control, chronic pressure, decreased mobility, shear force, obesity and arterial insufficiency    Diabetic/Pressure/Non Pressure Ulcers only:  Ulcer: Diabetic ulcer, fat layer exposed    Wound: Wound dehiscence        PAST MEDICAL HISTORY      Diagnosis Date    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 ([facility] [address] 5/22/2018    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 ([facility] [address] 5/21/2018    Critical lower limb ischemia 3/20/2020    Diabetes mellitus ([facility] [address]     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 2 diabetes mellitus, with fat layer exposed ([facility] [address] 5/22/2018    Disruption of closure of muscle or muscle flap, sequela 8/26/2020    DVT, lower extremity (HCC)     right leg     Gas gangrene of thigh ([facility] [address] 3/20/2020    Hyperlipidemia     Hypertension     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[facility] [address]     PVD (peripheral vascular disease) ([facility] [address]      Past Surgical History:   Procedure Laterality Date    AMPUTATION ABOVE KNEE Right 4/28/2020    DEBRIDEMENT OF AMPUTATION RIGHT ABOVE KNEE performed by Joseph Chisholm MD at [address] [place] [address] Right 4/30/2020    DEBRIDEMENT OF AMPUTATION RIGHT ABOVE KNEE,  POSS. ABOVE KNEE REVISION, POSS. CLOSURE, POSS.WOUND VAC -- BEN Sanchez / Sherren Lawyer TO LOOK IN performed by Joseph Chisholm MD at [place] [address] Right 3/20/2020    RIGHT LOWER EXTREMITY THROMBECTOMY, POSSIBLE ANGIOGRAM, POSSIBLE INTERVENTION, POSSIBLE BYPASS. performed by Joseph Chisholm MD at [address] Right 4/17/2020    RIGHT LEG DEBRIDEMENT INCISION AND DRAINAGE performed by Elicia Whitten MD at [facility] [address] Right 4/20/2020    RIGHT THIGH WOUND DRESSING CHANGE; DEBRIDEMENT, removal of muscle performed by Elicia Whitten MD at [address] Right 4/21/2020    RIGHT THIGH WOUND DRESSING CHANGE POSSIBLE  DEBRIDEMENT - NEEDS BEN Sanchez / JANETTE RON SEE performed by Sharla Avalos MD at [facility] [address]  Right 4/23/2020    RIGHT THIGH WOUND DRESSING CHANGE and DEBRIDEMENT performed by Joseph Chisholm MD at [facility] [address] Right 10/15/2020    DEBRIDEMENT RIGHT ABOVE KNEE AMPUTATION POSS.  REVISION POSS. WOUND VAC performed by Joseph Chisholm MD at [address] AMPUTATE THIGH,RE-AMPUTATN Right 11/1/2018    AMPUTATION ABOVE KNEE RIGHT LEG performed by Joaquin Napier MD at [facility] Right 5/24/2018    RIGHT FOOT INCISION AND DRAINAGE WITH PARTIAL BONE RESECTION performed by Jairon Cooney DPM at [address] [place] [address] OFFICE/OUTPT VISIT,PROCEDURE ONLY Right 8/3/2018    INCISION AND DRAINAGE MULTIPLE AREAS RIGHT FOOT WITH DEBRIDEMENT SOFT TISSUE performed by Jairon Cooney DPM at [facility] OR    RHINOPLASTY Right     leg      Family History   Problem Relation Age of Onset    Cancer Mother     Diabetes Father     Heart Failure Father     Hypertension Father     Cancer Sister      Social History     Tobacco Use    Smoking status: Current Every Day Smoker     Packs/day: 0.50     Years: 7.00     Pack years: 3.50     Types: Cigarettes    Smokeless tobacco: Never Used   Substance Use Topics    Alcohol use: No     Frequency: Never    Drug use: No     No Known Allergies  Current Outpatient Medications on File Prior to Encounter   Medication Sig Dispense Refill    cloNIDine (CATAPRES) 0.1 MG tablet TAKE ONE TABLET BY MOUTH THREE TIMES DAILY 90 tablet 5    gabapentin (NEURONTIN) 400 MG capsule Take 1 capsule by mouth 3 times daily for 30 days. 90 capsule 3    oxyCODONE (OXYCONTIN) 10 MG extended release tablet Take 1 tablet by mouth every 12 hours for 30 days.  60 each 0    cilostazol (PLETAL) 50 MG tablet Take 1 tablet by mouth 2 times daily 60 tablet 3    metoprolol tartrate (LOPRESSOR) 25 MG tablet Take 1 tablet by mouth 2 times daily 60 tablet 3    amLODIPine (NORVASC) 10 MG tablet TAKE ONE TABLET BY MOUTH EVERY MORNING--HOLD IF SYSTOLIC BLOOD PRESSURE IS LESS THAN 140. 30 tablet 3    DULoxetine (CYMBALTA) 60 MG extended release capsule TAKE 2 CAPSULES BY MOUTH EVERY MORNING 60 capsule 3    mirtazapine (REMERON) 15 MG tablet Take 1 tablet by mouth nightly 30 tablet 5    insulin lispro (HUMALOG) 100 UNIT/ML injection vial Inject 0-12 Units into the skin 3 times daily (with meals) 1 vial 3    docusate sodium (COLACE) 100 MG capsule Take 1 capsule by mouth 2 times daily 50 capsule 0    oxyCODONE (ROXICODONE) 5 MG immediate release tablet Take 1 tablet by mouth every 6 hours as needed for Pain for up to 30 days. 120 tablet 0    ULTICARE INSULIN SYRINGE 31G X 5/16\" 0.3 ML MISC USE TO INJECT INSULIN FIVE TIMES A  each 3    ONETOUCH ULTRA strip USE TO TEST BLOOD SUGARS DAILY AS NEEDED 100 each 2    glucose (GLUTOSE) 40 % GEL Take 37.5 mLs by mouth as needed (hypoglycemia) (Patient not taking: Reported on 11/13/2020) 45 g 1    bisacodyl (DULCOLAX) 5 MG EC tablet Take 1 tablet by mouth daily as needed for Constipation 30 tablet 0    ammonium lactate (LAC-HYDRIN) 12 % lotion Apply topically as needed. 1 Bottle 0    mineral oil-hydrophilic petrolatum (HYDROPHOR) ointment Apply topically as needed. (Patient not taking: Reported on 11/13/2020) 1 Tube 0    naloxone 4 MG/0.1ML LIQD nasal spray 1 spray by Nasal route as needed for Opioid Reversal 1 each 5    Handicap Placard MISC by Does not apply route Patient cannot walk 200 ft without stopping to rest.    Expiration 10/21/2024 1 each 0     No current facility-administered medications on file prior to encounter.         REVIEW OF SYSTEMS See HPI    Objective:    /78   Pulse 88   Temp 97.8 °F (36.6 °C) (Temporal)   Resp 18   Ht 6' 2\" (1.88 m)   Wt 240 lb (108.9 kg)   BMI 30.81 kg/m²   Wt Readings from Last 3 Encounters:   11/25/20 240 lb (108.9 kg)   11/18/20 240 lb (108.9 kg)   11/04/20 240 lb (108.9 kg)     PHYSICAL EXAM  CONSTITUTIONAL:   Awake, alert, cooperative   EYES:  lids and lashes normal   ENT: external ears and nose without lesions   NECK:  supple, symmetrical, trachea midline   SKIN:  Open wound Present    Assessment:     Problem List Items Addressed This Visit     Disruption of closure of muscle or muscle flap, sequela (Chronic)    Ischemic ulcer of right thigh with fat layer exposed ([facility] [address] - Primary          Pre Debridement Measurements:  Are located in the [place]  Documentation Flow Sheet  Post Debridement Measurements:  Wound/Ulcer Descriptions are Pre Debridement except measurements:     Wound Buttocks Right;Upper; Inner (Active)   Number of days:        Wound 08/26/20 Other (Comment) Right;Lateral #1 right AKA site (Active)   Wound Image   11/18/20 1201   Wound Etiology Non-Healing Surgical 08/26/20 1103   Wound Cleansed Cleansed with saline 11/18/20 1214   Dressing/Treatment Alginate;ABD 11/18/20 1214   Wound Length (cm) 2 cm 11/25/20 1144   Wound Width (cm) 1.5 cm 11/25/20 1144   Wound Depth (cm) 4.6 cm 11/25/20 1144   Wound Surface Area (cm^2) 3 cm^2 11/25/20 1144   Change in Wound Size % (l*w) -3650 11/25/20 1144   Wound Volume (cm^3) 13.8 cm^3 11/25/20 1144   Wound Healing % -3350 11/25/20 1144   Post-Procedure Length (cm) 2.3 cm 11/25/20 1220   Post-Procedure Width (cm) 1.8 cm 11/25/20 1220   Post-Procedure Depth (cm) 4.7 cm 11/25/20 1220   Post-Procedure Surface Area (cm^2) 4.14 cm^2 11/25/20 1220   Post-Procedure Volume (cm^3) 19.46 cm^3 11/25/20 1220   Distance Tunneling (cm) 6 cm 09/30/20 1212   Tunneling Position ___ O'Clock 3 09/30/20 1212   Undermining Starts ___ O'Clock 7 11/25/20 1144   Undermining Ends___ O'Clock 10 11/25/20 1144   Undermining Maxium Distance (cm) 3.7 11/25/20 1144   Wound Assessment Granulation tissue 11/25/20 1144   Drainage Amount Moderate 11/25/20 1144   Drainage Description Brown 11/25/20 1144   Odor None 11/25/20 1144   Nadia-wound Assessment Intact 11/25/20 1144   Wound Thickness Description not for Pressure Injury Full thickness 08/26/20 1103   Number of days: 91     Incision 04/28/20 Thigh Right;Medial (Active)   Number of days: 211   Procedure Note  Indications:  Based on my examination of this patient's wound(s)/ulcer(s) today, debridement is required to promote healing and evaluate the wound base.     Performed by: Melany Mendoza Renard Angel MD    Consent obtained:  Yes    Time out taken:  Yes    Pain Control: Anesthetic  Anesthetic: 4% Lidocaine Liquid Topical     Debridement:Excisional Debridement    Using curette the wound(s)/ulcer(s) was/were sharply debrided down through and including the removal of epidermis, dermis, subcutaneous tissue and muscle/fascia. Devitalized Tissue Debrided:  fibrin, biofilm, slough and exudate to stimulate bleeding to promote healing, post debridement good bleeding base and wound edges noted    Wound/Ulcer #: 1    Percent of Wound/Ulcer Debrided: 100%    Total Surface Area Debrided: 3 sq cm     Estimated Blood Loss:  Minimal  Hemostasis Achieved:  by pressure    Procedural Pain:  6 / 10   Post Procedural Pain:  5 / 10      Response to treatment:  With complaints of pain. Plan:   Treatment Note please see attached Discharge Instructions    Written patient dismissal instructions given to patient and signed by patient or POA. Discharge Instructions       Visit Discharge/Physician Orders     Discharge condition: Stable     Assessment of pain at discharge:yes     Anesthetic used: 4% lidocaine soln.     Discharge to: Home     Left via:Private automobile     Accompanied by: marybeth HAYES/HHA: [facility]     Dressing Orders:RIGHT AKA SITE-Cleanse with normal saline, pack loosely with calcium alginate, dry dressing and secure. Change daily.     Treatment Orders:Eat a diet high in protein and vitamin C. Take a multiple vitamin daily unless contraindicated.      [address] [place] [address] followup visit: 1 week _____________________________  (Please note your next appointment above and if you are unable to keep, kindly give a 24 hour notice.  Thank you.)     Physician signature:__________________________        If you experience any of the following, please call the [address] [place] [address] Road during business hours:     * Increase in Pain  * Temperature over 101  * Increase in drainage from your wound  * Drainage with a foul odor  * Bleeding  * Increase in swelling  * Need for compression bandage changes due to slippage, breakthrough drainage.     If you need medical attention outside of the business hours of the [address] [place] [address] Road please contact your PCP or go to the nearest emergency room.                                           Electronically signed by Miracle Garza MD on 11/25/2020 at 12:23 PM

## 2020-11-25 NOTE — TELEPHONE ENCOUNTER
[facility] the pt's insurance is calling for the pt because he has a hospital mattress but he is getting sores and wants to get a pressure mattress. He says he has tried in the past but nothing came of it.  She is asking if it can be sent over to [facility] at fax number [phone]

## 2020-11-25 NOTE — PLAN OF CARE
Problem: Wound:  Goal: Will show signs of wound healing; wound closure and no evidence of infection  Description: Will show signs of wound healing; wound closure and no evidence of infection  Outcome: Ongoing     Problem: Arterial:  Goal: Optimize blood flow for wound healing  Description: Optimize blood flow for wound healing  Outcome: Met This Shift     Problem: Smoking cessation:  Goal: Ability to formulate a plan to maintain a tobacco-free life will be supported  Description: Ability to formulate a plan to maintain a tobacco-free life will be supported  Outcome: Ongoing     Problem: Falls - Risk of:  Goal: Will remain free from falls  Description: Will remain free from falls  Outcome: Met This Shift     Problem: Blood Glucose:  Goal: Ability to maintain appropriate glucose levels will improve  Description: Ability to maintain appropriate glucose levels will improve  Outcome: Met This Shift

## 2020-11-30 ENCOUNTER — TELEPHONE (OUTPATIENT)
Dept: VASCULAR SURGERY | Age: 65
End: 2020-11-30

## 2020-11-30 NOTE — TELEPHONE ENCOUNTER
As pt failed to have COVID testing last week, tomorrow's surgery was rescheduled to 12/8.   He was instructed that he must have COVID testing on 12/3 between the hours of 6 am and  2:30 pm.

## 2020-12-04 ENCOUNTER — TELEPHONE (OUTPATIENT)
Dept: VASCULAR SURGERY | Age: 65
End: 2020-12-04

## 2020-12-04 RX ORDER — BLOOD SUGAR DIAGNOSTIC
1 STRIP MISCELLANEOUS 3 TIMES DAILY
Qty: 300 EACH | Refills: 1 | Status: SHIPPED
Start: 2020-12-04 | End: 2021-09-17 | Stop reason: SDUPTHER

## 2020-12-04 RX ORDER — GABAPENTIN 400 MG/1
400 CAPSULE ORAL 3 TIMES DAILY
Qty: 270 CAPSULE | Refills: 1 | Status: SHIPPED
Start: 2020-12-04 | End: 2021-02-24 | Stop reason: ALTCHOICE

## 2020-12-04 RX ORDER — MIRTAZAPINE 7.5 MG/1
7.5 TABLET, FILM COATED ORAL NIGHTLY
Qty: 90 TABLET | Refills: 1 | Status: SHIPPED
Start: 2020-12-04 | End: 2021-04-29

## 2020-12-04 RX ORDER — CLONIDINE HYDROCHLORIDE 0.1 MG/1
TABLET ORAL
Qty: 270 TABLET | Refills: 1 | Status: SHIPPED
Start: 2020-12-04 | End: 2020-12-21 | Stop reason: SDUPTHER

## 2020-12-04 RX ORDER — BLOOD SUGAR DIAGNOSTIC
1 STRIP MISCELLANEOUS
Qty: 100 EACH | Refills: 3 | Status: SHIPPED
Start: 2020-12-04 | End: 2021-06-24 | Stop reason: SDUPTHER

## 2020-12-04 RX ORDER — DULOXETIN HYDROCHLORIDE 60 MG/1
120 CAPSULE, DELAYED RELEASE ORAL EVERY MORNING
Qty: 180 CAPSULE | Refills: 1 | Status: SHIPPED
Start: 2020-12-04 | End: 2021-05-03

## 2020-12-04 RX ORDER — AMLODIPINE BESYLATE 10 MG/1
10 TABLET ORAL DAILY
Qty: 90 TABLET | Refills: 1 | Status: SHIPPED
Start: 2020-12-04 | End: 2021-01-25 | Stop reason: SDUPTHER

## 2020-12-04 NOTE — TELEPHONE ENCOUNTER
Pt again did not have COVID testing prior to 12/8 surgery. Surgery cancelled, pt to keep 12/9 appt with Dr. Inga Baker at wound care.

## 2020-12-07 NOTE — DISCHARGE INSTR - COC
Continuity of Care Form    Patient Name: Ross Larry   :  1955  MRN:  [id number]    Admit date:  (Not on file)  Discharge date:  ***    Code Status Order: Prior   Advance Directives:     Admitting Physician:  No admitting provider for patient encounter. PCP: Jay Lopez DO    Discharging Nurse: [facility] Unit/Room#: No information available for this encounter. Discharging Unit Phone Number: ***    Emergency Contact:   Extended Emergency Contact Information  Primary Emergency Contact: [facility] Phone: [phone]  Mobile Phone: [phone]  Relation: Other  Secondary Emergency Contact: [facility] Phone: [phone]  Relation: Brother/Sister    Past Surgical History:  Past Surgical History:   Procedure Laterality Date    AMPUTATION ABOVE KNEE Right 2020    DEBRIDEMENT OF AMPUTATION RIGHT ABOVE KNEE performed by Jean Pierre Crisostomo MD at [address] [place] [address] Right 2020    DEBRIDEMENT OF AMPUTATION RIGHT ABOVE KNEE,  POSS. ABOVE KNEE REVISION, POSS. CLOSURE, POSS.WOUND VAC -- BEN Lowery / Anthony Nolan TO LOOK IN performed by Jean Pierre Crisostomo MD at [place] [address] Right 3/20/2020    RIGHT LOWER EXTREMITY THROMBECTOMY, POSSIBLE ANGIOGRAM, POSSIBLE INTERVENTION, POSSIBLE BYPASS. performed by Jean Pierre Crisostomo MD at [address] Right 2020    RIGHT LEG DEBRIDEMENT INCISION AND DRAINAGE performed by Junior Serrano MD at [address] Right 2020    RIGHT THIGH WOUND DRESSING CHANGE; DEBRIDEMENT, removal of muscle performed by Junior Serrano MD at [address] Right 2020    RIGHT THIGH WOUND DRESSING CHANGE POSSIBLE  DEBRIDEMENT - NEEDS BEN Lowery / JANETTE RON SEE performed by Jalen Hannah MD at [address] Right 2020    RIGHT THIGH WOUND DRESSING CHANGE and DEBRIDEMENT performed by Warren Batista Tessy Mcdonough MD at [address] Right 10/15/2020    DEBRIDEMENT RIGHT ABOVE KNEE AMPUTATION POSS.  REVISION POSS. WOUND VAC performed by Tamra Remy MD at [address] [facility] [address] Right 11/1/2018    AMPUTATION ABOVE KNEE RIGHT LEG [facility] [address] [facility] 5/24/2018    RIGHT FOOT INCISION AND DRAINAGE WITH PARTIAL BONE RESECTION performed by Shaista Carpio DPM at [address] [facility] OFFICE/OUTPT VISIT,PROCEDURE ONLY Right 8/3/2018    INCISION AND DRAINAGE MULTIPLE AREAS RIGHT FOOT WITH DEBRIDEMENT SOFT TISSUE performed by Shaista Carpio DPM at [facility] Right     leg        Immunization History:   Immunization History   Administered Date(s) Administered    Influenza A ([id number]) Vaccine PF IM 12/15/2009    Influenza Virus Vaccine 11/02/2017    Influenza, Soni Torres, IM, PF (6 mo and older Fluzone, Flulaval, Fluarix, and 3 yrs and older Afluria) 11/02/2017, 10/21/2019, 10/12/2020    Pneumococcal Conjugate 13-valent ([id number]) 02/22/2018    Pneumococcal Polysaccharide ([id number]) 10/21/2019       Active Problems:  Patient Active Problem List   Diagnosis Code    DM II (diabetes mellitus, type II), controlled ([facility] E11.9    HTN (hypertension) I10    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 ([facility] I70.65    Coagulopathy ([facility]) D68.9    Moderate protein-calorie malnutrition ([facility] E44.0    PVD (peripheral vascular disease) ([facility]) I73.9    Leukocytosis D72.829    Stage 3 chronic kidney disease N18.30    Tobacco dependence F17.200    HLD (hyperlipidemia) E78.5    History of DVT (deep vein thrombosis) Z86.718    Osteomyelitis ([facility]) M86.9    S/P AKA (above knee amputation), right ([address] Z89.611    History of vascular surgery Z98.890    Occlusion of common femoral artery ([facility]) I70.209    Cellulitis, scrotum N49.2    Hyperglycemia due to type 2 diabetes mellitus ([address] E11.65    Critical lower limb ischemia I99.8    Gas gangrene of thigh ([facility]) A48.0    Vascular occlusion I99.8    Wound infection T14. 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 ([facility]) I82.549    Acute kidney injury ([address] N17.9    Disruption of closure of muscle or muscle flap, sequela T81.32XS    Ulceration of stump of above knee amputation of right lower extremity with fat layer exposed ([address] T87.89, L97.112       Isolation/Infection:   Isolation          No Isolation        Patient Infection Status     Infection Onset Added Last Indicated Last Indicated By Review Planned Expiration Resolved Resolved By    None active    Resolved    MRSA 10/15/20 10/17/20 10/15/20 Culture, Surgical   11/30/20 Lakeisha Rouse RN    Wound-right knee- is contained 11/30/20 per ERNESTOInes  Wound-Tissue 10/15/20    MRSA 08/26/20 08/28/20 08/26/20 Culture, Wound   10/13/20 Raymond Nicole RN    Right AKA amp site 8/26/20    MRSA 06/14/20 06/16/20 06/14/20 Culture, Wound   06/22/20 Lakeisha Rouse RN    Discontinue isolation per ID 6/19/20  Wound-thigh 6/14/20    COVID-19 Rule Out 06/03/20 06/04/20 06/03/20 Covid-19 Ambulatory (Ordered)   06/05/20 Rule-Out Test Resulted    C-diff Rule Out 05/18/20 05/18/20 05/18/20 Clostridium difficile EIA (Ordered)   05/18/20 Rule-Out Test Resulted    ESBL (Extended Spectrum Beta Lactamase)  04/22/20 04/22/20 Raymond Nicole RN   04/22/20 Raymond Nicole RN    Klebsiella oxytoca wound-right leg 4/17/20          Nurse Assessment:  Last Vital Signs: There were no vitals taken for this visit.     Last documented pain score (0-10 scale):    Last Weight:   Wt Readings from Last 1 Encounters:   11/25/20 240 lb (108.9 kg)     Mental Status:  {IP PT MENTAL STATUS:23302}    IV Access:  { RONY IV ACCESS:529822508}    Nursing Mobility/ADLs:  Walking   {CHP DME WUPX:200104877}  Transfer  {P DME DNKP:185171230}  Bathing  {CHP DME LCRQ:985597227}  Dressing  {CHP DME CMMI:371500061}  Toileting  {CHP DME AFDB:552981073}  Feeding  {P DME MDHJ:924303868}  Med Admin  {P DME OURT:843688299}  Med Delivery   { RONY MED Delivery:430475509}    Wound Care Documentation and Therapy:  Wound Buttocks Right;Upper; Inner (Active)   Number of days:        Wound 08/26/20 Other (Comment) Right;Lateral #1 right AKA site (Active)   Wound Image   11/18/20 1201   Wound Cleansed Cleansed with saline 11/18/20 1214   Dressing/Treatment Alginate;ABD 11/18/20 1214   Wound Length (cm) 2 cm 11/25/20 1144   Wound Width (cm) 1.5 cm 11/25/20 1144   Wound Depth (cm) 4.6 cm 11/25/20 1144   Wound Surface Area (cm^2) 3 cm^2 11/25/20 1144   Change in Wound Size % (l*w) -3650 11/25/20 1144   Wound Volume (cm^3) 13.8 cm^3 11/25/20 1144   Wound Healing % -3350 11/25/20 1144   Post-Procedure Length (cm) 2.3 cm 11/25/20 1220   Post-Procedure Width (cm) 1.8 cm 11/25/20 1220   Post-Procedure Depth (cm) 4.7 cm 11/25/20 1220   Post-Procedure Surface Area (cm^2) 4.14 cm^2 11/25/20 1220   Post-Procedure Volume (cm^3) 19.46 cm^3 11/25/20 1220   Undermining Starts ___ O'Clock 7 11/25/20 1144   Undermining Ends___ O'Clock 10 11/25/20 1144   Undermining Maxium Distance (cm) 3.7 11/25/20 1144   Wound Assessment Granulation tissue 11/25/20 1144   Drainage Amount Moderate 11/25/20 1144   Drainage Description Brown 11/25/20 1144   Odor None 11/25/20 1144   Nadia-wound Assessment Intact 11/25/20 1144   Number of days: 103        Elimination:  Continence:   · Bowel: {YES / IQ:35879}  · Bladder: {YES / AZ:11617}  Urinary Catheter: {Urinary Catheter:278705458} Colostomy/Ileostomy/Ileal Conduit: {YES / YK:73348}       Date of Last BM: ***  No intake or output data in the 24 hours ending 20 1509  No intake/output data recorded.     Safety Concerns:     [address] RONY Safety Concerns:506814638}    Impairments/Disabilities:      [address] [facility] Impairments/Disabilities:221129948}    Nutrition Therapy:  Current Nutrition Therapy:   [address] [facility] Diet List:342705116}    Routes of Feeding: {CHP DME Other Feedings:552872002}  Liquids: {Slp liquid thickness:13868}  Daily Fluid Restriction: {CHP DME Yes amt example:343109130}  Last Modified Barium Swallow with Video (Video Swallowing Test): {Done Not Done IEXO:091818420}    Treatments at the Time of Hospital Discharge:   Respiratory Treatments: ***  Oxygen Therapy:  {Therapy; copd oxygen:36207}  Ventilator:    { CC Vent STDI:637674014}    Rehab Therapies: {THERAPEUTIC INTERVENTION:2157386316}  Weight Bearing Status/Restrictions: [address] [facility] Weight Bearin}  Other Medical Equipment (for information only, NOT a DME order):  {EQUIPMENT:450110435}  Other Treatments: ***    Patient's personal belongings (please select all that are sent with patient):  {[facility] DME Belongings:453743236}    RN SIGNATURE:  {Esignature:569430907}    CASE MANAGEMENT/SOCIAL WORK SECTION    Inpatient Status Date: ***    Readmission Risk Assessment Score:  Readmission Risk              Risk of Unplanned Readmission:        0           Discharging to Facility/ Agency   · Name:   · Address:  · Phone:  · Fax:    Dialysis Facility (if applicable)   · Name:  · Address:  · Dialysis Schedule:  · Phone:  · Fax:    / signature: {Esignature:522108878}    PHYSICIAN SECTION    Prognosis: {Prognosis:2594429321}    Condition at Discharge: [address] Patient Condition:747083512}    Rehab Potential (if transferring to Rehab): {Prognosis:2442304122}    Recommended Labs or Other Treatments After Discharge: ***    Physician Certification: I certify the above information and transfer of Mary Carmen Roberson  is necessary for the continuing treatment of the diagnosis listed and that he requires {Admit to Appropriate Level of Care:78978} for {GREATER/LESS:864933081} 30 days.      Update Admission H&P: {CHP DME Changes in [facility]:830491524}    PHYSICIAN SIGNATURE:  {Esignature:760120692}

## 2020-12-09 ENCOUNTER — HOSPITAL ENCOUNTER (OUTPATIENT)
Dept: WOUND CARE | Age: 65
Discharge: HOME | End: 2020-12-09
Payer: COMMERCIAL

## 2020-12-09 VITALS
WEIGHT: 240 LBS | BODY MASS INDEX: 30.8 KG/M2 | SYSTOLIC BLOOD PRESSURE: 150 MMHG | TEMPERATURE: 98.6 F | HEIGHT: 74 IN | RESPIRATION RATE: 18 BRPM | HEART RATE: 84 BPM | DIASTOLIC BLOOD PRESSURE: 80 MMHG

## 2020-12-09 PROCEDURE — 11042 DBRDMT SUBQ TIS 1ST 20SQCM/<: CPT | Performed by: SURGERY

## 2020-12-09 PROCEDURE — 11042 DBRDMT SUBQ TIS 1ST 20SQCM/<: CPT

## 2020-12-09 RX ORDER — LIDOCAINE HYDROCHLORIDE 40 MG/ML
SOLUTION TOPICAL ONCE
Status: DISCONTINUED | OUTPATIENT
Start: 2020-12-09 | End: 2020-12-10 | Stop reason: HOSPADM

## 2020-12-09 ASSESSMENT — PAIN DESCRIPTION - FREQUENCY: FREQUENCY: CONTINUOUS

## 2020-12-09 ASSESSMENT — PAIN DESCRIPTION - LOCATION: LOCATION: LEG

## 2020-12-09 ASSESSMENT — PAIN DESCRIPTION - ORIENTATION: ORIENTATION: RIGHT

## 2020-12-09 ASSESSMENT — PAIN SCALES - GENERAL: PAINLEVEL_OUTOF10: 6

## 2020-12-09 ASSESSMENT — PAIN DESCRIPTION - ONSET: ONSET: ON-GOING

## 2020-12-09 ASSESSMENT — PAIN DESCRIPTION - DESCRIPTORS: DESCRIPTORS: STABBING;SHARP

## 2020-12-09 NOTE — PLAN OF CARE
Problem: Pain:  Goal: Pain level will decrease  Description: Pain level will decrease  12/9/2020 1452 by Tarun Jackson RN  Outcome: Met This Shift  12/9/2020 1451 by Tarun Jackson RN  Outcome: Met This Shift  12/9/2020 1216 by Fracisco Braswell RN  Outcome: Ongoing     Problem: Pain:  Goal: Control of acute pain  Description: Control of acute pain  12/9/2020 1452 by Tarun Jackson RN  Outcome: Met This Shift  12/9/2020 1451 by Tarun Jackson RN  Outcome: Met This Shift  12/9/2020 1216 by Fracisco Braswell RN  Outcome: Ongoing     Problem: Pain:  Goal: Control of chronic pain  Description: Control of chronic pain  12/9/2020 1452 by Tarun Jackson RN  Outcome: Met This Shift  12/9/2020 1451 by Tarun Jackson RN  Outcome: Met This Shift  12/9/2020 1216 by Fracisco Braswell RN  Outcome: Ongoing     Problem: Wound:  Goal: Will show signs of wound healing; wound closure and no evidence of infection  Description: Will show signs of wound healing; wound closure and no evidence of infection  12/9/2020 1452 by Tarun Jackson RN  Outcome: Met This Shift  12/9/2020 1451 by Tarun Jakcson RN  Outcome: Met This Shift  12/9/2020 1216 by Fracisco Braswell RN  Outcome: Ongoing     Problem: Arterial:  Goal: Optimize blood flow for wound healing  Description: Optimize blood flow for wound healing  12/9/2020 1452 by Tarun Jackson RN  Outcome: Met This Shift  12/9/2020 1451 by Tarun Jackson RN  Outcome: Met This Shift  12/9/2020 1216 by Fracisco Braswell RN  Outcome: Met This Shift     Problem: Smoking cessation:  Goal: Ability to formulate a plan to maintain a tobacco-free life will be supported  Description: Ability to formulate a plan to maintain a tobacco-free life will be supported  12/9/2020 1452 by Tarun Jackson RN  Outcome: Met This Shift  12/9/2020 1451 by Tarun Jackson RN  Outcome: Met This Shift  12/9/2020 1216 by Fracisco Braswell RN  Outcome: Ongoing

## 2020-12-09 NOTE — PROGRESS NOTES
Wound Healing Center Followup Visit Note    Referring Physician : Nohelia Norris Str RECORD NUMBER:  [id number]  AGE: 60 y.o. GENDER: male  : 1955  EPISODE DATE:  2020    Subjective:     Chief Complaint   Patient presents with    Wound Check     right leg amp site      HISTORY of PRESENT ILLNESS HPI   Daya Gonzalez is a 60 y.o. male who presents today in regards to follow up evaluation and treatment of wound/ulcer. That patient's past medical, family and social hx were reviewed and changes were made if present. History of Wound Context:  Pt presents in regards to chronic R above knee amputation wound was since 2020. He had developed postoperatively dehiscence of his wound site which had previously undergone a muscle flap. He was having a packed per home health care. The wound had gotten so small that it was difficult to pack. He is still having drainage significant though. At first visit to wound healing center 20 his wound has not been improving. He was not on abx at initial visit to center.     Previous lower extremity procedures  2018  R AKA for chronic R LE wound   3/20/2020 R groin exploration with iliofemoral artery embolectomy, primary closure of arteriotomy, R deep femoral atery embolectomy   2020 Excisional debridement of infected necrotic  tissue, right groin and right thigh   2020 RIGHT THIGH WOUND DRESSING CHANGE and DEBRIDEMENT   2020 Irrigation and excisional debridement of Right Thigh above knee amputation wound   2020 Irrigation and excisional debridement of the Right thigh above knee amputation wound 25 x45 cm  Excision of prosthetic R LE bypass graft  Ligation of right SFA  Posterior hamstring myocutaneous flap 65 cm flap for open wound of 45 x 25 cm per Dr. Mariama Adhikari     20  · Wound itself is small - opened up as difficult to pack  · 5 cm depth - culture done, significant amount of fluid expressed once opened up  · aquacell ag packing  · Discussed potential need for further surgical exploration in future - pt would like to avoid hospital and OR if possible  9/2/20  · Wound much improved - depth less  · Start doxycyline for staph culture  9/9/20  · Depth 5 cm again, fluid seems less  9/23/20  · Tunneling now 8 cm, concern that hittig bone  · Discussed with patient continuing current reigmen unlikely to result in healing  · Would benefit from further surgical debridement, likely revision of amputation site and possible wound vac  · Patient would like to consider options and will let me know next week  9/30/20  · Significant tunneling still  · Will proceed with or in 2 weeks per pt request  10/21/20  · OR 10/14/20  · dakins moistened kerlex  · Discussed with Dr. Catherine Shoulder re pain control issues  · Doxycycline 10 days script given  10/28/20  · Pain better controlled  · + Granulation tissue  · Bone less exposed  11/4/20  · Wound health  · Bone still exposed  11/18/20  · Wound continues to be improved  · Less bone exposed  11/25/20  · Bone exposure still an issue  · Will take for surgery - try dermagraft  · If not successful will likely need revision in future  12/9/20  · Bone exposure, wound overall improvd  · Patient did not follow though with testing for surgery    Wound/Ulcer Pain Timing/Severity: moderate  Quality of pain: aching, throbbing, shooting   Severity:  4 / 10   Modifying Factors: Pain worsens with dressing changes, pressure, debridement  Associated Signs/Symptoms: drainage and pain    Ulcer Identification:  Ulcer Type: arterial, diabetic, pressure and non-healing surgical  Contributing Factors: edema, diabetes, poor glucose control, chronic pressure, decreased mobility, shear force, obesity and arterial insufficiency    Diabetic/Pressure/Non Pressure Ulcers only:  Ulcer: Diabetic ulcer, fat layer exposed    Wound: Wound dehiscence        PAST MEDICAL HISTORY      Diagnosis Date    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 ([address] 5/22/2018    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 ([address] 5/21/2018    Critical lower limb ischemia 3/20/2020    Diabetes mellitus ([address]     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 2 diabetes mellitus, with fat layer exposed ([address] 5/22/2018    Disruption of closure of muscle or muscle flap, sequela 8/26/2020    DVT, lower extremity (HCC)     right leg     Gas gangrene of thigh ([address] 3/20/2020    Hyperlipidemia     Hypertension     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[address]     PVD (peripheral vascular disease) ([address]      Past Surgical History:   Procedure Laterality Date    AMPUTATION ABOVE KNEE Right 4/28/2020    DEBRIDEMENT OF AMPUTATION RIGHT ABOVE KNEE performed by Jackson Rincon MD at [address] [place] [address] Right 4/30/2020    DEBRIDEMENT OF AMPUTATION RIGHT ABOVE KNEE,  POSS. ABOVE KNEE REVISION, POSS. CLOSURE, POSS.WOUND VAC -- BEN Taylor / Ezequiel Loza TO LOOK IN performed by Jackson Rincon MD at [place] [address] Right 3/20/2020    RIGHT LOWER EXTREMITY THROMBECTOMY, POSSIBLE ANGIOGRAM, POSSIBLE INTERVENTION, POSSIBLE BYPASS. performed by Jackson Rincon MD at [address] Right 4/17/2020    RIGHT LEG DEBRIDEMENT INCISION AND DRAINAGE performed by Jeanie Mckenzie MD at [address] Right 4/20/2020    RIGHT THIGH WOUND DRESSING CHANGE; DEBRIDEMENT, removal of muscle performed by Jeanie Mckenzie MD at [address] Right 4/21/2020    RIGHT THIGH WOUND DRESSING CHANGE POSSIBLE  DEBRIDEMENT - NEEDS BEN Taylor / JANETTE TO SEE performed by Bharathi Butterfield MD at [address] Right 4/23/2020    RIGHT THIGH WOUND DRESSING CHANGE and DEBRIDEMENT performed by Jackson Rincon MD at [address] Right 10/15/2020    DEBRIDEMENT RIGHT ABOVE KNEE AMPUTATION POSS. REVISION POSS. WOUND VAC performed by Eric Martines MD at [facility]  Right 11/1/2018    AMPUTATION ABOVE KNEE RIGHT LEG performed by Eric Martines MD at [facility] Right 5/24/2018    RIGHT FOOT INCISION AND DRAINAGE WITH PARTIAL BONE RESECTION performed by Welton Kayser, DPM at [facility] OFFICE/OUTPT VISIT,PROCEDURE ONLY Right 8/3/2018    INCISION AND DRAINAGE MULTIPLE AREAS RIGHT FOOT WITH DEBRIDEMENT SOFT TISSUE performed by Welton Kayser, DPM at [place] [address] Right     leg      Family History   Problem Relation Age of Onset    Cancer Mother     Diabetes Father     Heart Failure Father     Hypertension Father     Cancer Sister      Social History     Tobacco Use    Smoking status: Current Every Day Smoker     Packs/day: 0.50     Years: 7.00     Pack years: 3.50     Types: Cigarettes    Smokeless tobacco: Never Used   Substance Use Topics    Alcohol use: No     Frequency: Never    Drug use: No     No Known Allergies  Current Outpatient Medications on File Prior to Encounter   Medication Sig Dispense Refill    mirtazapine (REMERON) 7.5 MG tablet Take 1 tablet by mouth nightly 90 tablet 1    cloNIDine (CATAPRES) 0.1 MG tablet TAKE ONE TABLET BY MOUTH THREE TIMES DAILY 270 tablet 1    metoprolol tartrate (LOPRESSOR) 25 MG tablet Take 1 tablet by mouth 2 times daily 90 tablet 1    amLODIPine (NORVASC) 10 MG tablet Take 1 tablet by mouth daily 90 tablet 1    gabapentin (NEURONTIN) 400 MG capsule Take 1 capsule by mouth 3 times daily for 30 days. 270 capsule 1    DULoxetine (CYMBALTA) 60 MG extended release capsule Take 2 capsules by mouth every morning 180 capsule 1    blood glucose test strips (ONETOUCH ULTRA) strip 1 each by Other route 3 times daily As needed.  300 each 1    insulin lispro (HUMALOG) 100 UNIT/ML injection vial Inject 0-12 Units into the skin 3 times daily (with meals) 1 vial 3    Insulin Syringe-Needle U-100 (ULTICARE INSULIN SYRINGE) 31G X 5/16\" 0.3 ML MISC 1 each by Other route 5 times daily 100 each 3    oxyCODONE (OXYCONTIN) 10 MG extended release tablet Take 1 tablet by mouth every 12 hours for 30 days. 60 each 0    oxyCODONE (ROXICODONE) 5 MG immediate release tablet Take 1 tablet by mouth every 6 hours as needed for Pain for up to 30 days. 120 tablet 0    cilostazol (PLETAL) 50 MG tablet Take 1 tablet by mouth 2 times daily 60 tablet 3    bisacodyl (DULCOLAX) 5 MG EC tablet Take 1 tablet by mouth daily as needed for Constipation 30 tablet 0    ammonium lactate (LAC-HYDRIN) 12 % lotion Apply topically as needed. 1 Bottle 0    mineral oil-hydrophilic petrolatum (HYDROPHOR) ointment Apply topically as needed. 1 Tube 0    docusate sodium (COLACE) 100 MG capsule Take 1 capsule by mouth 2 times daily (Patient taking differently: Take 100 mg by mouth 2 times daily as needed ) 50 capsule 0    Handicap Placard MISC by Does not apply route Patient cannot walk 200 ft without stopping to rest.    Expiration 10/21/2024 1 each 0    glucose (GLUTOSE) 40 % GEL Take 37.5 mLs by mouth as needed (hypoglycemia) (Patient not taking: Reported on 11/13/2020) 45 g 1     No current facility-administered medications on file prior to encounter.         REVIEW OF SYSTEMS See HPI    Objective:    BP (!) 150/80   Pulse 84   Temp 98.6 °F (37 °C) (Temporal)   Resp 18   Ht 6' 2\" (1.88 m)   Wt 240 lb (108.9 kg)   BMI 30.81 kg/m²   Wt Readings from Last 3 Encounters:   12/09/20 240 lb (108.9 kg)   11/25/20 240 lb (108.9 kg)   11/18/20 240 lb (108.9 kg)     PHYSICAL EXAM  CONSTITUTIONAL:   Awake, alert, cooperative   EYES:  lids and lashes normal   ENT: external ears and nose without lesions   NECK:  supple, symmetrical, trachea midline   SKIN:  Open wound Present    Assessment:     Problem List Items Addressed This Visit     Disruption of closure of muscle or muscle flap, sequela healing and evaluate the wound base. Performed by: Callie Gallardo MD    Consent obtained:  Yes    Time out taken:  Yes    Pain Control: Anesthetic  Anesthetic: 4% Lidocaine Liquid Topical     Debridement:Excisional Debridement    Using curette the wound(s)/ulcer(s) was/were sharply debrided down through and including the removal of epidermis, dermis, subq    Devitalized Tissue Debrided:  fibrin, biofilm, slough and exudate to stimulate bleeding to promote healing, post debridement good bleeding base and wound edges noted    Wound/Ulcer #: 1    Percent of Wound/Ulcer Debrided: 100%    Total Surface Area Debrided: 0.64 sq cm     Estimated Blood Loss:  Minimal  Hemostasis Achieved:  by pressure    Procedural Pain:  6 / 10   Post Procedural Pain:  5 / 10      Response to treatment:  With complaints of pain. Plan:   Treatment Note please see attached Discharge Instructions    Written patient dismissal instructions given to patient and signed by patient or POA. Discharge Instructions       Visit Discharge/Physician Orders     Discharge condition: Stable     Assessment of pain at discharge:yes     Anesthetic used: 4% lidocaine soln.     Discharge to: Home     Left via:Private automobile     Accompanied by: spouse     [facility]: [facility]     Dressing Orders:RIGHT AKA SITE-Cleanse with normal saline, pack loosely with calcium alginate, dry dressing and secure. Change daily.     Treatment Orders:Eat a diet high in protein and vitamin C. Take a multiple vitamin daily unless contraindicated.      [facility] followup visit: 1 week (D/T outpatient surgery-debridement and graft)_____________________________  (Please note your next appointment above and if you are unable to keep, kindly give a 24 hour notice.  Thank you.)     Physician signature:__________________________  [facility]  If you experience any of the following, please call the [facility] [address] [facility] [address] during business hours:     * Increase in Pain  * Temperature over 101  * Increase in drainage from your wound  * Drainage with a foul odor  * Bleeding  * Increase in swelling  * Need for compression bandage changes due to slippage, breakthrough drainage.     If you need medical attention outside of the business hours of the [address] [place] [address] Road please contact your PCP or go to the nearest emergency room.                                                      Electronically signed by Reyna Pagan MD on 12/9/2020 at 5:16 PM

## 2020-12-10 ENCOUNTER — HOSPITAL ENCOUNTER (OUTPATIENT)
Age: 65
Discharge: HOME | End: 2020-12-12
Payer: COMMERCIAL

## 2020-12-10 PROCEDURE — U0003 INFECTIOUS AGENT DETECTION BY NUCLEIC ACID (DNA OR RNA); SEVERE ACUTE RESPIRATORY SYNDROME CORONAVIRUS 2 (SARS-COV-2) (CORONAVIRUS DISEASE [COVID-19]), AMPLIFIED PROBE TECHNIQUE, MAKING USE OF HIGH THROUGHPUT TECHNOLOGIES AS DESCRIBED BY CMS-2020-01-R: HCPCS

## 2020-12-11 LAB
SARS-COV-2: NOT DETECTED
SOURCE: NORMAL

## 2020-12-11 NOTE — PROGRESS NOTES
Patient had covid testing at [facility] site on 12/10/20. Patient instructed by Dr. Dexter Green office per patient.

## 2020-12-14 RX ORDER — OXYCODONE HYDROCHLORIDE 10 MG/1
5 TABLET ORAL EVERY 4 HOURS PRN
COMMUNITY
End: 2020-12-14 | Stop reason: SDUPTHER

## 2020-12-14 RX ORDER — OXYCODONE HYDROCHLORIDE 10 MG/1
5 TABLET ORAL EVERY 4 HOURS PRN
Qty: 180 TABLET | Refills: 0 | Status: SHIPPED
Start: 2020-12-14 | End: 2020-12-18 | Stop reason: SDUPTHER

## 2020-12-14 RX ORDER — OXYCODONE HCL 10 MG/1
10 TABLET, FILM COATED, EXTENDED RELEASE ORAL EVERY 12 HOURS PRN
COMMUNITY
End: 2020-12-14 | Stop reason: SDUPTHER

## 2020-12-14 RX ORDER — OXYCODONE HCL 10 MG/1
10 TABLET, FILM COATED, EXTENDED RELEASE ORAL EVERY 12 HOURS PRN
Qty: 60 EACH | Refills: 0 | Status: SHIPPED
Start: 2020-12-14 | End: 2021-01-26 | Stop reason: SDUPTHER

## 2020-12-14 NOTE — PROGRESS NOTES
[facility] PRE-ADMISSION TESTING GENERAL INSTRUCTIONS- [facility]-phone number:[phone]    GENERAL INSTRUCTIONS  [x] Antibacterial Soap shower Night before and/or AM of Surgery  [] Corey wipe instruction sheet and wipes given. [x] Nothing by mouth after midnight, including gum, candy, mints, or water. [x] You may brush your teeth, gargle, but do NOT swallow water. []Hibiclens shower  the night before and the morning of surgery. Do not use             Hibiclens on your face or head. [x]No smoking, chewing tobacco, illegal drugs, or alcohol within 24 hours of your surgery. [x] Jewelry, valuables or body piercing's should not be brought to the hospital. All body and/or tongue piercing's must be removed prior to arriving to hospital.  ALL hair pins must be removed. [x] Do not wear makeup, lotions, powders, deodorant. Nail polish as directed by the nurse. [x] Arrange transportation with a responsible adult  to and from the hospital. If you do not have a responsible adult  to transport you, you will need to make arrangements with a medical transportation company (i.e. [employer]. A Uber/taxi/bus is not appropriate unless you are accompanied by a responsible adult ). Arrange for someone to be with you for the remainder of the day and for 24 hours after your procedure due to having had anesthesia. Who will be your  for transportation? __Friend__________   Who will be staying with you for 24 hrs after your procedure? _Friend____________  [x] Bring insurance card and photo ID.  [] Transfusion Bracelet: Please bring with you to hospital, day of surgery  [] Bring urine specimen day of surgery. Any small container is acceptable. [] Use inhalers the morning of surgery and bring with you to hospital.  [] Bring copy of living will or healthcare power of  papers to be placed in your electronic record. [] CPAP/BI-PAP: Please bring your machine if you are to spend the night in the hospital.     PARKING INSTRUCTIONS:   [x] Arrival Time:___1100_______  [x] Arrive to the [facility] entrance for surgery on 12/17/20. You will be directed to pre op. .           [x] To reach the [facility] lobby from [facility], upon entering the hospital, take elevator B to the 3rd floor. EDUCATION INSTRUCTIONS:      [] Knee or hip replacement booklet & exercise pamphlets given. [x] [address] placed in chart. [] Pre-admission Testing educational folder given  [x] Incentive Spirometry,coughing & deep breathing exercises reviewed. []Medication information sheet(s)   []Fluoroscopy-Xray used in surgery reviewed with patient. Educational pamphlet placed in chart. [x]Pain: Post-op pain is normal and to be expected. You will be asked to rate your pain from 0-10(a zero is not acceptable-education is needed). Your post-op pain goal is:  [x] Ask your nurse for your pain medication. [] Joint camp offered. [] Joint replacement booklets given. [] Other:___________________________    MEDICATION INSTRUCTIONS:   [x]Bring a complete list of your medications, please write the last time you took the medicine, give this list to the nurse. [x] Take the following medications the morning of surgery with 1-2 ounces of water: SEE LIST  [x] Stop herbal supplements and vitamins 5 days before your surgery. [x] DO NOT take any diabetic medicine the morning of surgery. Follow instructions for insulin the day before surgery. [x] If you are diabetic and your blood sugar is low or you feel symptomatic, you may drink 1-2 ounces of apple juice or take a glucose tablet. The morning of your procedure, you may call the pre-op area if you have concerns about your blood sugar [phone]. [] Use your inhalers the morning of surgery. Bring your emergency inhaler with you day of surgery. [x] Follow physician instructions regarding any blood thinners you may be taking. WHAT TO EXPECT:  [x] The day of surgery you will be greeted and checked in by the [facility].  In addition, you will be registered in the [place] by a Patient Access Representative. Please bring your photo ID and insurance card. A nurse will greet you in accordance to the time you are needed in the pre-op area to prepare you for surgery. Please do not be discouraged if you are not greeted in the order you arrive as there are many variables that are involved in patient preparation. Your patience is greatly appreciated as you wait for your nurse. Please bring in items such as: books, magazines, newspapers, electronics, or any other items  to occupy your time in the waiting area. [x]  Delays may occur with surgery and staff will make a sincere effort to keep you informed of delays. If any delays occur with your procedure, we apologize ahead of time for your inconvenience as we recognize the value of your time.

## 2020-12-16 ENCOUNTER — HOSPITAL ENCOUNTER (OUTPATIENT)
Dept: WOUND CARE | Age: 65
Discharge: HOME | End: 2020-12-16
Payer: COMMERCIAL

## 2020-12-17 ENCOUNTER — HOSPITAL ENCOUNTER (OUTPATIENT)
Age: 65
Setting detail: OUTPATIENT SURGERY
Discharge: HOME | End: 2020-12-17
Attending: SURGERY | Admitting: SURGERY
Payer: COMMERCIAL

## 2020-12-17 ENCOUNTER — ANESTHESIA EVENT (OUTPATIENT)
Dept: OPERATING ROOM | Age: 65
End: 2020-12-17
Payer: COMMERCIAL

## 2020-12-17 ENCOUNTER — ANESTHESIA (OUTPATIENT)
Dept: OPERATING ROOM | Age: 65
End: 2020-12-17
Payer: COMMERCIAL

## 2020-12-17 VITALS
TEMPERATURE: 97 F | BODY MASS INDEX: 31.44 KG/M2 | WEIGHT: 245 LBS | SYSTOLIC BLOOD PRESSURE: 144 MMHG | HEIGHT: 74 IN | DIASTOLIC BLOOD PRESSURE: 68 MMHG | HEART RATE: 76 BPM | RESPIRATION RATE: 16 BRPM | OXYGEN SATURATION: 98 %

## 2020-12-17 VITALS
OXYGEN SATURATION: 99 % | DIASTOLIC BLOOD PRESSURE: 68 MMHG | RESPIRATION RATE: 22 BRPM | SYSTOLIC BLOOD PRESSURE: 121 MMHG

## 2020-12-17 PROBLEM — T87.89 ULCERATION OF STUMP OF ABOVE KNEE AMPUTATION OF RIGHT LOWER EXTREMITY WITH FAT LAYER EXPOSED (HCC): Status: RESOLVED | Noted: 2020-10-15 | Resolved: 2020-12-17

## 2020-12-17 PROBLEM — L97.112 ULCERATION OF STUMP OF ABOVE KNEE AMPUTATION OF RIGHT LOWER EXTREMITY WITH FAT LAYER EXPOSED (HCC): Status: RESOLVED | Noted: 2020-10-15 | Resolved: 2020-12-17

## 2020-12-17 LAB
HCT VFR BLD CALC: 37 % (ref 37–54)
HEMOGLOBIN: 12.1 G/DL (ref 12.5–16.5)
INR BLD: 1.1
MCH RBC QN AUTO: 26 PG (ref 26–35)
MCHC RBC AUTO-ENTMCNC: 32.7 % (ref 32–34.5)
MCV RBC AUTO: 79.6 FL (ref 80–99.9)
METER GLUCOSE: 222 MG/DL (ref 74–99)
PDW BLD-RTO: 14.6 FL (ref 11.5–15)
PLATELET # BLD: 288 E9/L (ref 130–450)
PMV BLD AUTO: 11 FL (ref 7–12)
PROTHROMBIN TIME: 12.7 SEC (ref 9.3–12.4)
RBC # BLD: 4.65 E12/L (ref 3.8–5.8)
WBC # BLD: 12.7 E9/L (ref 4.5–11.5)

## 2020-12-17 PROCEDURE — 2500000003 HC RX 250 WO HCPCS: Performed by: NURSE ANESTHETIST, CERTIFIED REGISTERED

## 2020-12-17 PROCEDURE — 85027 COMPLETE CBC AUTOMATED: CPT

## 2020-12-17 PROCEDURE — 3700000000 HC ANESTHESIA ATTENDED CARE: Performed by: SURGERY

## 2020-12-17 PROCEDURE — 36415 COLL VENOUS BLD VENIPUNCTURE: CPT

## 2020-12-17 PROCEDURE — 87075 CULTR BACTERIA EXCEPT BLOOD: CPT

## 2020-12-17 PROCEDURE — 2580000003 HC RX 258: Performed by: SURGERY

## 2020-12-17 PROCEDURE — 82962 GLUCOSE BLOOD TEST: CPT

## 2020-12-17 PROCEDURE — 2500000003 HC RX 250 WO HCPCS: Performed by: SURGERY

## 2020-12-17 PROCEDURE — 87205 SMEAR GRAM STAIN: CPT

## 2020-12-17 PROCEDURE — 3600000002 HC SURGERY LEVEL 2 BASE: Performed by: SURGERY

## 2020-12-17 PROCEDURE — 2709999900 HC NON-CHARGEABLE SUPPLY: Performed by: SURGERY

## 2020-12-17 PROCEDURE — 15271 SKIN SUB GRAFT TRNK/ARM/LEG: CPT | Performed by: SURGERY

## 2020-12-17 PROCEDURE — 87102 FUNGUS ISOLATION CULTURE: CPT

## 2020-12-17 PROCEDURE — 7100000010 HC PHASE II RECOVERY - FIRST 15 MIN: Performed by: SURGERY

## 2020-12-17 PROCEDURE — 85610 PROTHROMBIN TIME: CPT

## 2020-12-17 PROCEDURE — 6360000002 HC RX W HCPCS: Performed by: NURSE ANESTHETIST, CERTIFIED REGISTERED

## 2020-12-17 PROCEDURE — 7100000011 HC PHASE II RECOVERY - ADDTL 15 MIN: Performed by: SURGERY

## 2020-12-17 PROCEDURE — 3700000001 HC ADD 15 MINUTES (ANESTHESIA): Performed by: SURGERY

## 2020-12-17 PROCEDURE — 3600000012 HC SURGERY LEVEL 2 ADDTL 15MIN: Performed by: SURGERY

## 2020-12-17 PROCEDURE — 6360000002 HC RX W HCPCS: Performed by: SURGERY

## 2020-12-17 PROCEDURE — 87070 CULTURE OTHR SPECIMN AEROBIC: CPT

## 2020-12-17 PROCEDURE — 87186 SC STD MICRODIL/AGAR DIL: CPT

## 2020-12-17 RX ORDER — FENTANYL CITRATE 50 UG/ML
INJECTION, SOLUTION INTRAMUSCULAR; INTRAVENOUS PRN
Status: DISCONTINUED | OUTPATIENT
Start: 2020-12-17 | End: 2020-12-17 | Stop reason: SDUPTHER

## 2020-12-17 RX ORDER — GLYCOPYRROLATE 1 MG/5 ML
SYRINGE (ML) INTRAVENOUS PRN
Status: DISCONTINUED | OUTPATIENT
Start: 2020-12-17 | End: 2020-12-17 | Stop reason: SDUPTHER

## 2020-12-17 RX ORDER — PROPOFOL 10 MG/ML
INJECTION, EMULSION INTRAVENOUS CONTINUOUS PRN
Status: DISCONTINUED | OUTPATIENT
Start: 2020-12-17 | End: 2020-12-17 | Stop reason: SDUPTHER

## 2020-12-17 RX ORDER — SODIUM CHLORIDE 0.9 % (FLUSH) 0.9 %
10 SYRINGE (ML) INJECTION EVERY 12 HOURS SCHEDULED
Status: DISCONTINUED | OUTPATIENT
Start: 2020-12-17 | End: 2020-12-17 | Stop reason: HOSPADM

## 2020-12-17 RX ORDER — MIDAZOLAM HYDROCHLORIDE 1 MG/ML
INJECTION INTRAMUSCULAR; INTRAVENOUS PRN
Status: DISCONTINUED | OUTPATIENT
Start: 2020-12-17 | End: 2020-12-17 | Stop reason: SDUPTHER

## 2020-12-17 RX ORDER — SODIUM CHLORIDE 9 MG/ML
INJECTION, SOLUTION INTRAVENOUS CONTINUOUS PRN
Status: DISCONTINUED | OUTPATIENT
Start: 2020-12-17 | End: 2020-12-17

## 2020-12-17 RX ORDER — SODIUM CHLORIDE 9 MG/ML
INJECTION, SOLUTION INTRAVENOUS CONTINUOUS
Status: DISCONTINUED | OUTPATIENT
Start: 2020-12-17 | End: 2020-12-17 | Stop reason: HOSPADM

## 2020-12-17 RX ORDER — SODIUM CHLORIDE 0.9 % (FLUSH) 0.9 %
10 SYRINGE (ML) INJECTION PRN
Status: DISCONTINUED | OUTPATIENT
Start: 2020-12-17 | End: 2020-12-17 | Stop reason: HOSPADM

## 2020-12-17 RX ORDER — KETAMINE HYDROCHLORIDE 10 MG/ML
INJECTION, SOLUTION INTRAMUSCULAR; INTRAVENOUS PRN
Status: DISCONTINUED | OUTPATIENT
Start: 2020-12-17 | End: 2020-12-17 | Stop reason: SDUPTHER

## 2020-12-17 RX ADMIN — FENTANYL CITRATE 25 MCG: 50 INJECTION, SOLUTION INTRAMUSCULAR; INTRAVENOUS at 13:34

## 2020-12-17 RX ADMIN — FENTANYL CITRATE 25 MCG: 50 INJECTION, SOLUTION INTRAMUSCULAR; INTRAVENOUS at 13:24

## 2020-12-17 RX ADMIN — Medication 0.2 MG: at 13:38

## 2020-12-17 RX ADMIN — FENTANYL CITRATE 50 MCG: 50 INJECTION, SOLUTION INTRAMUSCULAR; INTRAVENOUS at 13:18

## 2020-12-17 RX ADMIN — MIDAZOLAM 2 MG: 1 INJECTION INTRAMUSCULAR; INTRAVENOUS at 13:07

## 2020-12-17 RX ADMIN — VANCOMYCIN HYDROCHLORIDE 1.5 G: 10 INJECTION, POWDER, LYOPHILIZED, FOR SOLUTION INTRAVENOUS at 12:22

## 2020-12-17 RX ADMIN — HYDROMORPHONE HYDROCHLORIDE 0.5 MG: 1 INJECTION, SOLUTION INTRAMUSCULAR; INTRAVENOUS; SUBCUTANEOUS at 14:46

## 2020-12-17 RX ADMIN — SODIUM CHLORIDE: 9 INJECTION, SOLUTION INTRAVENOUS at 13:00

## 2020-12-17 RX ADMIN — PROPOFOL 100 MCG/KG/MIN: 10 INJECTION, EMULSION INTRAVENOUS at 13:18

## 2020-12-17 RX ADMIN — KETAMINE HYDROCHLORIDE 30 MG: 10 INJECTION, SOLUTION INTRAMUSCULAR; INTRAVENOUS at 13:40

## 2020-12-17 ASSESSMENT — PAIN SCALES - GENERAL
PAINLEVEL_OUTOF10: 0
PAINLEVEL_OUTOF10: 8

## 2020-12-17 ASSESSMENT — PAIN - FUNCTIONAL ASSESSMENT: PAIN_FUNCTIONAL_ASSESSMENT: 0-10

## 2020-12-17 ASSESSMENT — LIFESTYLE VARIABLES: SMOKING_STATUS: 1

## 2020-12-17 NOTE — TELEPHONE ENCOUNTER
Received call from pharmacy. They are questioning the dose on the Oxycodone IR. Pt normally takes a 5 mg tablet 1 every 6 hrs. The script that was sent over was for 10 mg take . 5 tablet every 4 hrs. Was there a change in his medication?

## 2020-12-17 NOTE — ANESTHESIA POSTPROCEDURE EVALUATION
Department of Anesthesiology  Postprocedure Note    Patient: Dianna Lawson  MRN: [id number]  YOB: 1955  Date of evaluation: 12/17/2020  Time:  2:34 PM     Procedure Summary     Date: 12/17/20 Room / Location: [facility] OR 04 / [facility]    Anesthesia Start: 8980 Anesthesia Stop: 7939    Procedure: DEBRIDEMENT  RIGHT ABOVE KNEE AMPUTATION WITH POSS. ADVANCED SKIN THERAPY (Right ) Diagnosis: (NON HEALING WOUND RT ABOVE KNEE AMP)    Surgeons: Chava Gonsalez MD Responsible Provider: Kellen Cook MD    Anesthesia Type: general ASA Status: 3          Anesthesia Type: general    Blaine Phase I: Blaine Score: 10    Blaine Phase II:      Last vitals: Reviewed and per EMR flowsheets.        Anesthesia Post Evaluation    Patient location during evaluation: PACU  Patient participation: complete - patient participated  Level of consciousness: awake and alert  Pain score: 2  Airway patency: patent  Nausea & Vomiting: no vomiting and no nausea  Complications: no  Cardiovascular status: hemodynamically stable  Respiratory status: spontaneous ventilation  Hydration status: stable

## 2020-12-17 NOTE — PROGRESS NOTES
PC in OR room for Dr. Ekta Byers regarding Pain medication order for patient and discharge order/instructions.

## 2020-12-17 NOTE — ANESTHESIA PRE PROCEDURE
Department of Anesthesiology  Preprocedure Note       Name:  August Jaeger   Age:  63 y.o.  :  1955                                          MRN:  [id number]         Date:  2020      Surgeon: Sharifa Quintana):  Joaquin Napier MD    Procedure: Procedure(s):  DEBRIDEMENT  RIGHT ABOVE KNEE AMPUTATION WITH POSS. ADVANCED SKIN THERAPY    Medications prior to admission:   Prior to Admission medications    Medication Sig Start Date End Date Taking? Authorizing Provider   mineral oil-hydrophilic petrolatum (AQUAPHOR) ointment Apply topically twice daily dispense 14 ounce jar 20   Juanito Kline, DO   oxyCODONE (OXYCONTIN) 10 MG extended release tablet Take 1 tablet by mouth every 12 hours as needed for Pain for up to 30 days. 20  Juanito Kline, DO   oxyCODONE HCl (OXY-IR) 10 MG immediate release tablet Take 0.5 tablets by mouth every 4 hours as needed for Pain for up to 30 days. 20  Juanito Kline, DO   mirtazapine (REMERON) 7.5 MG tablet Take 1 tablet by mouth nightly 20   Juanito Kline, DO   cloNIDine (CATAPRES) 0.1 MG tablet TAKE ONE TABLET BY MOUTH THREE TIMES DAILY 20   Juanito Kline, DO   metoprolol tartrate (LOPRESSOR) 25 MG tablet Take 1 tablet by mouth 2 times daily 20   Juanito Kline, DO   amLODIPine (NORVASC) 10 MG tablet Take 1 tablet by mouth daily 20   Juanito Kline, DO   gabapentin (NEURONTIN) 400 MG capsule Take 1 capsule by mouth 3 times daily for 30 days. 12/4/20 1/3/21  Juanito Kline, DO   DULoxetine (CYMBALTA) 60 MG extended release capsule Take 2 capsules by mouth every morning 20   Juanito Kline, DO   blood glucose test strips (ONETOUCH ULTRA) strip 1 each by Other route 3 times daily As needed.  20   Juanito Kline, DO   insulin lispro (HUMALOG) 100 UNIT/ML injection vial Inject 0-12 Units into the skin 3 times daily (with meals) 20   Juanito Kline, DO   Insulin Syringe-Needle U-100 (ULTICARE INSULIN SYRINGE) 31G X 5/16\" 0.3 ML MISC 1 each by Other route 5 times daily 12/4/20   Juanito Kline DO   cilostazol (PLETAL) 50 MG tablet Take 1 tablet by mouth 2 times daily 10/16/20   Juanito Kline DO   glucose (GLUTOSE) 40 % GEL Take 37.5 mLs by mouth as needed (hypoglycemia)  Patient not taking: Reported on 11/13/2020 6/23/20   Miley Martin MD   bisacodyl (DULCOLAX) 5 MG EC tablet Take 1 tablet by mouth daily as needed for Constipation 6/23/20   Eddie Gomez MD   ammonium lactate (LAC-HYDRIN) 12 % lotion Apply topically as needed. 6/23/20   Miley Martin MD   mineral oil-hydrophilic petrolatum (HYDROPHOR) ointment Apply topically as needed. 6/23/20   Miley Martin MD   docusate sodium (COLACE) 100 MG capsule Take 1 capsule by mouth 2 times daily  Patient taking differently: Take 100 mg by mouth 2 times daily as needed  3/26/20   Quinn Harrington MD   Handicap Placard MISC by Does not apply route Patient cannot walk 200 ft without stopping to rest.    Expiration 10/21/2024 10/21/19   Juanito Kline DO       Current medications:    No current facility-administered medications for this visit. No current outpatient medications on file.      Facility-Administered Medications Ordered in Other Visits   Medication Dose Route Frequency Provider Last Rate Last Admin    0.9 % sodium chloride infusion   Intravenous Continuous Marcus Denny MD        sodium chloride flush 0.9 % injection 10 mL  10 mL Intravenous 2 times per day Marcus Denny MD        sodium chloride flush 0.9 % injection 10 mL  10 mL Intravenous PRN Marcus Denny MD        vancomycin 1.5 g in dextrose 5% 300 mL IVPB  1,500 mg Intravenous On Call to Stephania Diez  mL/hr at 12/17/20 1222 1.5 g at 12/17/20 1222       Allergies:  No Known Allergies    Problem List:    Patient Active Problem List   Diagnosis Code    DM II (diabetes mellitus, type II), controlled ([facility] E11.9    HTN (hypertension) I10  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 ([facility]) I70.65    Coagulopathy ([facility]) D68.9    Moderate protein-calorie malnutrition ([facility]) E44.0    PVD (peripheral vascular disease) ([facility]) I73.9    Leukocytosis D72.829    Stage 3 chronic kidney disease N18.30    Tobacco dependence F17.200    HLD (hyperlipidemia) E78.5    History of DVT (deep vein thrombosis) Z86.718    Osteomyelitis ([facility]) M86.9    S/P AKA (above knee amputation), right ([address] Z89.611    History of vascular surgery Z98.890    Occlusion of common femoral artery ([facility]) I70.209    Cellulitis, scrotum N49.2    Hyperglycemia due to type 2 diabetes mellitus ([address] E11.65    Critical lower limb ischemia I99.8    Gas gangrene of thigh ([facility]) A48.0    Vascular occlusion I99.8    Wound infection T14. 8XXA, L08.9    Postoperative wound infection T81.49XA    Ischemic ulcer of right thigh with fat layer exposed ([address] L97.112    Ischemic ulcer of right thigh with necrosis of muscle ([address] L97.113    Above knee amputation of right lower extremity ([address] J82.222W    Postoperative pain G89.18    Encounter for dental examination and cleaning with abnormal findings Z01.21    Chronic embolism and thrombosis of unspecified tibial vein ([facility]) I82.549    Acute kidney injury ([address] N17.9    Disruption of closure of muscle or muscle flap, sequela T81.32XS    Ulceration of stump of above knee amputation of right lower extremity with fat layer exposed ([address] T87.89, W59.894       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 ([address] 5/22/2018    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 ([address] 5/21/2018    Critical lower limb ischemia 3/20/2020    Diabetes mellitus ([address]     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 2 diabetes mellitus, with fat layer exposed ([address] 5/22/2018    Disruption of closure of muscle or muscle flap, sequela 8/26/2020    DVT, lower extremity (HCC)     right leg     Gas gangrene of thigh ([facility] 3/20/2020    Hyperlipidemia     Hypertension     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[facility]     PVD (peripheral vascular disease) [facility]        Past Surgical History:        Procedure Laterality Date    AMPUTATION ABOVE KNEE Right 4/28/2020    DEBRIDEMENT OF AMPUTATION RIGHT ABOVE KNEE performed by Verónica Dotson MD at [address] [facility] Right 4/30/2020    DEBRIDEMENT OF AMPUTATION RIGHT ABOVE KNEE,  POSS. ABOVE KNEE REVISION, POSS. CLOSURE, POSS.WOUND VAC -- BEN Thao / Kalie Valverde TO LOOK IN performed by Verónica Dotson MD at [place] [address] Right 3/20/2020    RIGHT LOWER EXTREMITY THROMBECTOMY, POSSIBLE ANGIOGRAM, POSSIBLE INTERVENTION, POSSIBLE BYPASS. performed by Verónica Dotson MD at [facility] Right 4/17/2020    RIGHT LEG DEBRIDEMENT INCISION AND DRAINAGE performed by Alexis Mahoney MD at [facility] Right 4/20/2020    RIGHT THIGH WOUND DRESSING CHANGE; DEBRIDEMENT, removal of muscle performed by Alexis Mahoney MD at [facility] Right 4/21/2020    RIGHT THIGH WOUND DRESSING CHANGE POSSIBLE  DEBRIDEMENT - NEEDS BEN Thao / JANETTE RON SEE performed by Jules London MD at [facility] Right 4/23/2020    RIGHT THIGH WOUND DRESSING CHANGE and DEBRIDEMENT performed by Verónica Dotson MD at [facility] Right 10/15/2020    DEBRIDEMENT RIGHT ABOVE KNEE AMPUTATION POSS.  REVISION POSS. WOUND VAC performed by Verónica Dotson MD at [address] [facility] Right 11/1/2018    AMPUTATION ABOVE KNEE RIGHT LEG performed by Verónica Dotson MD at [facility] Right 5/24/2018    RIGHT FOOT INCISION AND DRAINAGE WITH PARTIAL BONE RESECTION performed by Kelley Araujo DPM at [address]  [place] OFFICE/OUTPT VISIT,PROCEDURE ONLY Right 8/3/2018    INCISION AND DRAINAGE MULTIPLE AREAS RIGHT FOOT WITH DEBRIDEMENT SOFT TISSUE performed by Brian Guerrero DPM at [place] [address] Right     leg        Social History:    Social History     Tobacco Use    Smoking status: Current Every Day Smoker     Packs/day: 0.50     Years: 7.00     Pack years: 3.50     Types: Cigarettes    Smokeless tobacco: Never Used   Substance Use Topics    Alcohol use: No     Frequency: Never                                Ready to quit: Not Answered  Counseling given: Not Answered      Vital Signs (Current): There were no vitals filed for this visit. BP Readings from Last 3 Encounters:   12/17/20 134/69   12/09/20 (!) 150/80   11/25/20 122/78       NPO Status:                                                                                 BMI:   Wt Readings from Last 3 Encounters:   12/17/20 245 lb (111.1 kg)   12/09/20 240 lb (108.9 kg)   11/25/20 240 lb (108.9 kg)     There is no height or weight on file to calculate BMI.    CBC:   Lab Results   Component Value Date    WBC 12.7 12/17/2020    RBC 4.65 12/17/2020    HGB 12.1 12/17/2020    HCT 37.0 12/17/2020    MCV 79.6 12/17/2020    RDW 14.6 12/17/2020     12/17/2020       CMP:   Lab Results   Component Value Date     06/18/2020    K 4.2 06/18/2020    K 5.0 06/05/2020     06/18/2020    CO2 27 06/18/2020    BUN 19 06/18/2020    CREATININE 1.9 06/18/2020    GFRAA 44 06/18/2020    LABGLOM 44 06/18/2020    GLUCOSE 130 06/18/2020    PROT 7.3 06/18/2020    CALCIUM 8.8 06/18/2020    BILITOT 0.2 06/18/2020    ALKPHOS 82 06/18/2020    AST 8 06/18/2020    ALT 16 06/18/2020       POC Tests: No results for input(s): POCGLU, POCNA, POCK, POCCL, POCBUN, POCHEMO, POCHCT in the last 72 hours.     Coags:   Lab Results   Component Value Date    PROTIME 12.7 12/17/2020    INR 1.1 12/17/2020    APTT 31.6 04/16/2020       HCG (If Applicable): No results found for: PREGTESTUR, PREGSERUM, HCG, HCGQUANT     ABGs:   Lab Results   Component Value Date    EHS0VTO 22.8 03/20/2020        Type & Screen (If Applicable):  No results found for: LABABO, LABRH    Drug/Infectious Status (If Applicable):  No results found for: HIV, HEPCAB    COVID-19 Screening (If Applicable):   Lab Results   Component Value Date    COVID19 Not Detected 12/10/2020     4/16/2020  Ventricular Rate  79  BPM  Final  04/16/2020  2:21 PM  [facility]    Atrial Rate  79  BPM  Final  04/16/2020  2:21 PM  [facility]    P-R Interval  244  ms  Final  04/16/2020  2:21 PM  [facility]    QRS Duration  98  ms  Final  04/16/2020  2:21 PM  [facility]    Q-T Interval  426  ms  Final  04/16/2020  2:21 PM  [facility]    QTc Calculation (Bazett)  488  ms  Final  04/16/2020  2:21 PM  [facility]    P Axis  21  degrees  Final  04/16/2020  2:21 PM  [facility]    R Axis  14  degrees  Final  04/16/2020  2:21 PM  [facility]    T Axis  46  degrees  Final  04/16/2020  2:21 PM  [facility]    Testing Performed By     Lab - [address]  Name  Director  Address  Valid Date Range    360-[facility]  [facility] MUSE  Unknown  Unknown  04/18/16 0721-Present    Narrative & Impression     Sinus rhythm with 1st degree AV block with premature supraventricular complexes  Nonspecific T wave abnormality  Abnormal ECG  Confirmed by Lola Melara ([id number]) on 4/17/2020 3:56:14 PM     Chest X-ray 06/04/2020  Impression         1. Tip of the left arm PICC line is at the level of the proximal SVC. 2. Small left basilar opacity, which may represent atelectasis,    pneumonia, effusion or combination thereof.           Anesthesia Evaluation  Patient summary reviewed and Nursing notes reviewed no history of anesthetic complications:   Airway: Mallampati: III  TM distance: >3 FB   Neck ROM: full  Mouth opening: > = 3 FB Dental:          Pulmonary:   (+) decreased breath sounds,  current smoker                           Cardiovascular:    (+) hypertension:, hyperlipidemia      ECG reviewed  Rhythm: regular  Rate: normal  Echocardiogram reviewed               ROS comment: Echo 03/2020 Summary   No source of embolus found. No intra-cardiac mass, thrombus or vegetations. Overall ejection fraction is normal.   Normal right ventricle structure and function. Mild mitral regurgitation is present. Neuro/Psych:   (+) neuromuscular disease:,             GI/Hepatic/Renal:   (+) renal disease: ESRD and dialysis,           Endo/Other:    (+) DiabetesType II DM, poorly controlled, , blood dyscrasia: anemia:., .                  ROS comment: 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[facility]  Abdominal:           Vascular:   + PVD, aortic or cerebral, DVT, .        ROS comment: S/p femoral bypass  S/p fem endarterectomy  S/p aka. Anesthesia Plan      general     ASA 3     (Pt assessed prior to anesthesia, note entered as soon as able)  Induction: intravenous. Anesthetic plan and risks discussed with patient. Plan discussed with attending and CRNA. Attending anesthesiologist reviewed and agrees with Randal Cedeno MD   12/17/2020      Pt seen, examined, chart reviewed, plan discussed.   Carriea Aid  12/17/2020  1:00 PM

## 2020-12-17 NOTE — PROGRESS NOTES
COVID testing completed on: 12/10/2020  Results of the test: negative  The patient verbally confirms that they did adhere to the self-quarantine guidelines. No signs or symptoms expressed or observed.

## 2020-12-17 NOTE — TELEPHONE ENCOUNTER
Was sent in incorrectly. We can either do the 10 mg with half tablet every 4-6 hours or 5 mg every 6 hours.

## 2020-12-17 NOTE — OP NOTE
Operative Note      Patient: July Valera  YOB: 1955  MRN: [id number]    Date of Procedure: 12/17/2020    Pre-Op Diagnosis: NON HEALING WOUND RT ABOVE KNEE AMP    Post-Op Diagnosis: Same       Procedure(s):  DEBRIDEMENT  RIGHT ABOVE KNEE AMPUTATION WITH POSS. ADVANCED SKIN THERAPY   Application of dermagraft  Debridement of skin, subq, muscle and bone    Wound 5 cm length x 3 cm width x 8 cm depth    Surgeon(s):  Hugo Mullins MD    Assistant: Key VILLALOBOS    Anesthesia: Monitor Anesthesia Care    Estimated Blood Loss (mL): less than 50     Complications: None    Specimens:   ID Type Source Tests Collected by Time Destination   1 : right thigh culture Tissue Tissue CULTURE, ANAEROBIC, CULTURE, FUNGUS, GRAM STAIN, CULTURE, SURGICAL Hugo Mullins MD 12/17/2020 1342        Implants:  * No implants in log *      Drains: * No LDAs found *    Findings: bone exposure, scar    DESCRIPTION OF PROCEDURE: The patient was identified and the procedure was confirmed. The wound and surrounding area was prepped and draped in sterile fashion. With the patient in supine position, the wound was debrided sharply of fibrotic, necrotic, and hyperkeratotic tissue, including a layer of surrounding healthy tissue using a 10 blade and cautery for a total surface area of < 20 cm2. The skin was excised through the level of the subcutaneous tissue , fascia and muscle and bone.     The bone was exposed. There was no purulence. Primarily scar. Cultures were sent.       100%% of the wound was debrided. Wound was copiously irrigated with normal saline solution. There was minimal bleeding that was controlled with pressure. Wound was felt to be appropriate for advanced skin therapy. Dermagraft which had been prepped and prepared per  instruction was placed in bed of wound on the bone.   Mepitel one than placed to secure.     Dressings consisting of aquacell, 4x4 and medipore dressing applied.     Needle, sponge and instrument counts were reported as correct x2. The patient tolerated the procedure and was transferred to the recovery area in satisfactory condition.      Electronically signed by Leonard Canada MD on 12/17/2020 at 1:55 PM

## 2020-12-17 NOTE — H&P
Vascular Surgery History & Physical Exam      Chief Complaint: Chronic wound of the R AKA    HISTORY OF PRESENT ILLNESS:                The patient is a 63 y.o. male who presents to the hospital for elective debridement of chronic wound of the R AKA, possible aka revision. IMPRESSION:  Chronic wound of the R AKA    PLAN:  Debridement of chronic wound of the possible aka revision. Possible wound vac, possible advanced skin therapy. I reviewed the procedure with the patient and family as available. I discussed the procedure, risks, benefits, complications, and alternatives of the procedure. They understand and consent.   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 ([address] 5/22/2018    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 ([address] 5/21/2018    Critical lower limb ischemia 3/20/2020    Diabetes mellitus ([address]     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 2 diabetes mellitus, with fat layer exposed ([address] 5/22/2018    Disruption of closure of muscle or muscle flap, sequela 8/26/2020    DVT, lower extremity (HCC)     right leg     Gas gangrene of thigh ([address] 3/20/2020    Hyperlipidemia     Hypertension     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[address]     PVD (peripheral vascular disease) ([address]      Past Surgical History:   Procedure Laterality Date    AMPUTATION ABOVE KNEE Right 4/28/2020    DEBRIDEMENT OF AMPUTATION RIGHT ABOVE KNEE performed by Cuco Ortega MD at [address] [place] [address]  AMPUTATION ABOVE KNEE Right 4/30/2020    DEBRIDEMENT OF AMPUTATION RIGHT ABOVE KNEE,  POSS. ABOVE KNEE REVISION, POSS. CLOSURE, POSS.WOUND VAC -- BEN Graff / Nunu Ricardo TO LOOK IN performed by Crys Ha MD at [facility] [address] Right 3/20/2020    RIGHT LOWER EXTREMITY THROMBECTOMY, POSSIBLE ANGIOGRAM, POSSIBLE INTERVENTION, POSSIBLE BYPASS. performed by Crys Ha MD at [address] Right 4/17/2020    RIGHT LEG DEBRIDEMENT INCISION AND DRAINAGE performed by Fei Bowers MD at [address] Right 4/20/2020    RIGHT THIGH WOUND DRESSING CHANGE; DEBRIDEMENT, removal of muscle performed by Fei Bowers MD at [address] Right 4/21/2020    RIGHT THIGH WOUND DRESSING CHANGE POSSIBLE  DEBRIDEMENT - NEEDS BEN Grfaf / JANETTE TO SEE performed by Ignacio Kwong MD at [address] Right 4/23/2020    RIGHT THIGH WOUND DRESSING CHANGE and DEBRIDEMENT performed by Crys Ha MD at [address] [place] [address] Right 10/15/2020    DEBRIDEMENT RIGHT ABOVE KNEE AMPUTATION POSS.  REVISION POSS. WOUND VAC performed by Crys Ha MD at [facility] Right 11/1/2018    AMPUTATION ABOVE KNEE RIGHT LEG performed by Crys Ha MD at [address] [facility] Right 5/24/2018    RIGHT FOOT INCISION AND DRAINAGE WITH PARTIAL BONE RESECTION performed by Ozzie Medrano DPM at [address] [facility] OFFICE/OUTPT VISIT,PROCEDURE ONLY Right 8/3/2018    INCISION AND DRAINAGE MULTIPLE AREAS RIGHT FOOT WITH DEBRIDEMENT SOFT TISSUE performed by Ozzie Medrano DPM at [facility] Right     leg      Current Medications:     Current Facility-Administered Medications:     0.9 % sodium chloride infusion, , Intravenous, Continuous, Crys Ha MD, New Bag at 12/17/20 1300   sodium chloride flush 0.9 % injection 10 mL, 10 mL, Intravenous, 2 times per day, Jean Pierre Crisostmoo MD    sodium chloride flush 0.9 % injection 10 mL, 10 mL, Intravenous, PRN, Jean Pierre Crisostomo MD    vancomycin 1.5 g in dextrose 5% 300 mL IVPB, 1,500 mg, Intravenous, On Call to OR, Jean Pierre Crisostomo MD, Last Rate: 150 mL/hr at 12/17/20 1222, 1.5 g at 12/17/20 1222  Allergies:  Patient has no known allergies.   Social History     Socioeconomic History    Marital status: Single     Spouse name: Not on file    Number of children: Not on file    Years of education: Not on file    Highest education level: Not on file   Occupational History    Not on file   Social Needs    Financial resource strain: Not on file    Food insecurity     Worry: Not on file     Inability: Not on file    Transportation needs     Medical: Not on file     Non-medical: Not on file   Tobacco Use    Smoking status: Current Every Day Smoker     Packs/day: 0.50     Years: 7.00     Pack years: 3.50     Types: Cigarettes    Smokeless tobacco: Never Used   Substance and Sexual Activity    Alcohol use: No     Frequency: Never    Drug use: No    Sexual activity: Not on file   Lifestyle    Physical activity     Days per week: Not on file     Minutes per session: Not on file    Stress: Not on file   Relationships    Social connections     Talks on phone: Not on file     Gets together: Not on file     Attends Evangelical service: Not on file     Active member of club or organization: Not on file     Attends meetings of clubs or organizations: Not on file     Relationship status: Not on file    Intimate partner violence     Fear of current or ex partner: Not on file     Emotionally abused: Not on file     Physically abused: Not on file     Forced sexual activity: Not on file   Other Topics Concern    Not on file   Social History Narrative    Not on file     Family History   Problem Relation Age of Onset  Cancer Mother     Diabetes Father     Heart Failure Father     Hypertension Father     Cancer Sister        REVIEW OF SYSTEMS:  The chart was reviewed.     PHYSICAL EXAM:    Vitals:    12/17/20 1154   BP: 134/69   Pulse: 77   Resp: 20   Temp: 98.1 °F (36.7 °C)   SpO2: 99%     CONSTITUTIONAL:  awake, alert, cooperative, no apparent distress, and appears stated age  NECK:  supple, symmetrical, trachea midline  LUNGS:  no increased work of breathing, good resp excursion  CARDIOVASCULAR:  regular rate and rhythm  ABDOMEN:  soft, non-distended and non-tender  Chronic wound of the R AKA    LABS:    Lab Results   Component Value Date    WBC 12.7 (H) 12/17/2020    HGB 12.1 (L) 12/17/2020    HCT 37.0 12/17/2020     12/17/2020    PROTIME 12.7 (H) 12/17/2020    INR 1.1 12/17/2020    APTT 31.6 04/16/2020    K 4.2 06/18/2020    BUN 19 06/18/2020    CREATININE 1.9 (H) 06/18/2020       RADIOLOGY:    Hugo Mullins

## 2020-12-18 ENCOUNTER — TELEPHONE (OUTPATIENT)
Dept: PRIMARY CARE CLINIC | Age: 65
End: 2020-12-18

## 2020-12-18 LAB — GRAM STAIN ORDERABLE: NORMAL

## 2020-12-18 RX ORDER — OXYCODONE HYDROCHLORIDE 5 MG/1
5 TABLET ORAL EVERY 6 HOURS PRN
Qty: 180 TABLET | Refills: 0 | Status: SHIPPED
Start: 2020-12-18 | End: 2020-12-18 | Stop reason: SDUPTHER

## 2020-12-18 RX ORDER — OXYCODONE HYDROCHLORIDE 5 MG/1
5 TABLET ORAL
Qty: 180 TABLET | Refills: 0 | Status: SHIPPED | OUTPATIENT
Start: 2020-12-18 | End: 2021-01-15 | Stop reason: SDUPTHER

## 2020-12-18 NOTE — TELEPHONE ENCOUNTER
Okay. In chart it states every 6 hours and per previous enounter, you stated 5mg every 6 hours. Do you want the quantity changed or keep 180tabs?

## 2020-12-18 NOTE — TELEPHONE ENCOUNTER
[facility] pharmacy received new script for patients oxycodone 10 mg 1/2 tablet every 4 hours prn. Previous script stated every 4-6 hours prn. Also current script sent was sent for a 60 day supply, asking for updated prescription 1/2 tablet every 4-6 hours prn for a 30 day supply if applicable. Pharmacy can be reached at [phone] with questions.

## 2020-12-18 NOTE — TELEPHONE ENCOUNTER
I am not going back and forth with the pharmacist, last contact stated that he needed new and I sent. Please verify what exactly the pharmacist needs so that we may accommodate.

## 2020-12-18 NOTE — TELEPHONE ENCOUNTER
Spoke with pharmacist. Per pharmacist, patient is claiming that the dosage was increased to every 4 hours. Did you increase this dosage?

## 2020-12-18 NOTE — TELEPHONE ENCOUNTER
Spoke with pharmacist. States with it being a controlled medication, they will need a new script. Script for 10mg tab was cancelled as patient has not been able to pick it up with current sig.  Med pended    Oxycodone IM 5mg q6r prn

## 2020-12-19 LAB — ANAEROBIC CULTURE: NORMAL

## 2020-12-20 LAB
CULTURE SURGICAL: ABNORMAL
ORGANISM: ABNORMAL

## 2020-12-21 ENCOUNTER — OFFICE VISIT (OUTPATIENT)
Dept: PRIMARY CARE CLINIC | Age: 65
End: 2020-12-21
Payer: COMMERCIAL

## 2020-12-21 VITALS
DIASTOLIC BLOOD PRESSURE: 80 MMHG | TEMPERATURE: 97.8 F | HEART RATE: 78 BPM | RESPIRATION RATE: 16 BRPM | SYSTOLIC BLOOD PRESSURE: 138 MMHG | HEIGHT: 74 IN | OXYGEN SATURATION: 97 % | BODY MASS INDEX: 31.46 KG/M2

## 2020-12-21 PROCEDURE — G8417 CALC BMI ABV UP PARAM F/U: HCPCS | Performed by: FAMILY MEDICINE

## 2020-12-21 PROCEDURE — G8482 FLU IMMUNIZE ORDER/ADMIN: HCPCS | Performed by: FAMILY MEDICINE

## 2020-12-21 PROCEDURE — 2022F DILAT RTA XM EVC RTNOPTHY: CPT | Performed by: FAMILY MEDICINE

## 2020-12-21 PROCEDURE — 99213 OFFICE O/P EST LOW 20 MIN: CPT | Performed by: FAMILY MEDICINE

## 2020-12-21 PROCEDURE — 3044F HG A1C LEVEL LT 7.0%: CPT | Performed by: FAMILY MEDICINE

## 2020-12-21 PROCEDURE — 4004F PT TOBACCO SCREEN RCVD TLK: CPT | Performed by: FAMILY MEDICINE

## 2020-12-21 PROCEDURE — G8427 DOCREV CUR MEDS BY ELIG CLIN: HCPCS | Performed by: FAMILY MEDICINE

## 2020-12-21 PROCEDURE — 3017F COLORECTAL CA SCREEN DOC REV: CPT | Performed by: FAMILY MEDICINE

## 2020-12-21 RX ORDER — CLONIDINE HYDROCHLORIDE 0.1 MG/1
TABLET ORAL
Qty: 270 TABLET | Refills: 3 | Status: SHIPPED | OUTPATIENT
Start: 2020-12-21 | End: 2021-01-15 | Stop reason: SDUPTHER

## 2020-12-21 ASSESSMENT — ENCOUNTER SYMPTOMS
PHOTOPHOBIA: 0
CONSTIPATION: 1
BLOOD IN STOOL: 0
BACK PAIN: 0
APNEA: 1
DIARRHEA: 0
SHORTNESS OF BREATH: 0

## 2020-12-21 NOTE — PROGRESS NOTES
Genitourinary: Negative for dysuria, frequency, hematuria and urgency. Musculoskeletal: Positive for arthralgias, gait problem, joint swelling and myalgias. Negative for back pain. Right Sided above-the-knee amputation revised multiple times until it is  just distal to the hip joint   Skin: Negative. Neurological: Positive for numbness. Negative for dizziness and tremors. Hematological: Does not bruise/bleed easily. Psychiatric/Behavioral: Positive for decreased concentration, dysphoric mood and sleep disturbance. Negative for hallucinations, self-injury and suicidal ideas. The patient is nervous/anxious. All other systems reviewed and are negative. Prior to Visit Medications    Medication Sig Taking? Authorizing Provider   cloNIDine (CATAPRES) 0.1 MG tablet TAKE ONE TABLET BY MOUTH THREE TIMES DAILY Yes Juanito Kline, DO   oxyCODONE (ROXICODONE) 5 MG immediate release tablet Take 1 tablet by mouth every 4-6 hours as needed for Pain for up to 30 days. Yes Juanito Kline, DO   mineral oil-hydrophilic petrolatum (AQUAPHOR) ointment Apply topically twice daily dispense 14 ounce jar Yes Juanito Kline, DO   oxyCODONE (OXYCONTIN) 10 MG extended release tablet Take 1 tablet by mouth every 12 hours as needed for Pain for up to 30 days. Yes Juanito Kline, DO   mirtazapine (REMERON) 7.5 MG tablet Take 1 tablet by mouth nightly Yes Juanito Kline, DO   metoprolol tartrate (LOPRESSOR) 25 MG tablet Take 1 tablet by mouth 2 times daily Yes Juanito Kline, DO   amLODIPine (NORVASC) 10 MG tablet Take 1 tablet by mouth daily Yes Juanito Kline, DO   gabapentin (NEURONTIN) 400 MG capsule Take 1 capsule by mouth 3 times daily for 30 days. Yes Juanito Kline, DO   DULoxetine (CYMBALTA) 60 MG extended release capsule Take 2 capsules by mouth every morning Yes Juanito Kline, DO   blood glucose test strips (ONETOUCH ULTRA) strip 1 each by Other route 3 times daily As needed.  Yes Juanito Kline, DO insulin lispro (HUMALOG) 100 UNIT/ML injection vial Inject 0-12 Units into the skin 3 times daily (with meals) Yes Juanito Kline DO   Insulin Syringe-Needle U-100 (ULTICARE INSULIN SYRINGE) 31G X 5/16\" 0.3 ML MISC 1 each by Other route 5 times daily Yes Juanito Kline DO   cilostazol (PLETAL) 50 MG tablet Take 1 tablet by mouth 2 times daily Yes Juanito Kline DO   glucose (GLUTOSE) 40 % GEL Take 37.5 mLs by mouth as needed (hypoglycemia) Yes Eddie Gomez MD   bisacodyl (DULCOLAX) 5 MG EC tablet Take 1 tablet by mouth daily as needed for Constipation Yes Eddie Gomez MD   ammonium lactate (LAC-HYDRIN) 12 % lotion Apply topically as needed. Yes Zo Davila MD   docusate sodium (COLACE) 100 MG capsule Take 1 capsule by mouth 2 times daily  Patient taking differently: Take 100 mg by mouth 2 times daily as needed  Yes Serene Morales MD   Handicap Placard MISC by Does not apply route Patient cannot walk 200 ft without stopping to rest.    Expiration 10/21/2024 Yes Juanito Kline DO        Social History     Tobacco Use    Smoking status: Current Every Day Smoker     Packs/day: 0.50     Years: 7.00     Pack years: 3.50     Types: Cigarettes    Smokeless tobacco: Never Used   Substance Use Topics    Alcohol use: No     Frequency: Never        Vitals:    12/21/20 1344   BP: 138/80   Pulse: 78   Resp: 16   Temp: 97.8 °F (36.6 °C)   SpO2: 97%   Weight: Comment: wheelchair   Height: 6' 2\" (1.88 m)     Estimated body mass index is 31.46 kg/m² as calculated from the following:    Height as of this encounter: 6' 2\" (1.88 m). Weight as of 12/17/20: 245 lb (111.1 kg). Physical Exam  Vitals signs reviewed. HENT:      Head: Normocephalic and atraumatic. Mouth/Throat:      Dentition: Abnormal dentition (edentulous upper). Eyes:      General: No scleral icterus. Conjunctiva/sclera: Conjunctivae normal.      Pupils: Pupils are equal, round, and reactive to light.    Neck: Musculoskeletal: Neck supple. Thyroid: No thyromegaly. Cardiovascular:      Rate and Rhythm: Normal rate and regular rhythm. Heart sounds: Normal heart sounds. No murmur. Pulmonary:      Effort: Pulmonary effort is normal.      Breath sounds: Normal breath sounds. No rales. Abdominal:      General: Bowel sounds are decreased. There is no distension. Palpations: Abdomen is soft. Tenderness: There is no abdominal tenderness. Musculoskeletal: Normal range of motion. Legs:    Lymphadenopathy:      Cervical: No cervical adenopathy. Skin:     General: Skin is warm and dry. Findings: No erythema or rash. Neurological:      Mental Status: He is alert and oriented to person, place, and time. Cranial Nerves: No cranial nerve deficit. Psychiatric:         Judgment: Judgment normal.         Assessment/Plan:   Diagnosis Orders   1. RAGHU on CPAP  DME Order for CPAP as OP    Hemoglobin A1C    Microalbumin / Creatinine Urine Ratio    Lipid Panel    Basic Metabolic Panel    Hepatic Function Panel    CBC    Psa screening    Uric Acid    TSH without Reflex   2. Controlled type 2 diabetes mellitus with other specified complication, with long-term current use of insulin (HCC)  Hemoglobin A1C    Microalbumin / Creatinine Urine Ratio    Lipid Panel    Basic Metabolic Panel    Hepatic Function Panel    CBC    Psa screening    Uric Acid    TSH without Reflex   3. Hypertension, unspecified type  Hemoglobin A1C    Microalbumin / Creatinine Urine Ratio    Lipid Panel    Basic Metabolic Panel    Hepatic Function Panel    CBC    Psa screening    Uric Acid    TSH without Reflex   4. S/P AKA (above knee amputation), right (HCC)  Hemoglobin A1C    Microalbumin / Creatinine Urine Ratio    Lipid Panel    Basic Metabolic Panel    Hepatic Function Panel    CBC    Psa screening    Uric Acid    TSH without Reflex   5.  PVD (peripheral vascular disease) ([facility])  Hemoglobin A1C Microalbumin / Creatinine Urine Ratio    Lipid Panel    Basic Metabolic Panel    Hepatic Function Panel    CBC    Psa screening    Uric Acid    TSH without Reflex   6. Stage 3 chronic kidney disease, unspecified whether stage 3a or 3b CKD  Hemoglobin A1C    Microalbumin / Creatinine Urine Ratio    Lipid Panel    Basic Metabolic Panel    Hepatic Function Panel    CBC    Psa screening    Uric Acid    TSH without Reflex   7. Tobacco dependence  Hemoglobin A1C    Microalbumin / Creatinine Urine Ratio    Lipid Panel    Basic Metabolic Panel    Hepatic Function Panel    CBC    Psa screening    Uric Acid    TSH without Reflex   8. Colon cancer screening     9. Encounter for screening for malignant neoplasm of prostate   Psa screening     With current medication regimen. We will order baseline labs prior to next visit. See him back in 8 weeks. Scripts for new CPAP will be sent today as well. You Reid D.O.   2:20 PM  12/21/2020       This document may have been prepared at least partially through the use of voice recognition software. Although effort is taken to assure the accuracy of this document, it is possible that grammatical, syntax,  or spelling errors may occur.

## 2020-12-23 ENCOUNTER — TELEPHONE (OUTPATIENT)
Dept: PRIMARY CARE CLINIC | Age: 65
End: 2020-12-23

## 2020-12-29 NOTE — TELEPHONE ENCOUNTER
Please have the patient continue to monitor his blood pressure. If it is still running high even after he takes his medications we will need to adjust his dosing schedule.

## 2020-12-30 ENCOUNTER — HOSPITAL ENCOUNTER (OUTPATIENT)
Dept: WOUND CARE | Age: 65
Discharge: HOME | End: 2020-12-30
Payer: COMMERCIAL

## 2020-12-30 VITALS
SYSTOLIC BLOOD PRESSURE: 146 MMHG | WEIGHT: 245 LBS | HEIGHT: 74 IN | TEMPERATURE: 98.2 F | RESPIRATION RATE: 18 BRPM | DIASTOLIC BLOOD PRESSURE: 76 MMHG | HEART RATE: 76 BPM | BODY MASS INDEX: 31.44 KG/M2

## 2020-12-30 PROCEDURE — 11043 DBRDMT MUSC&/FSCA 1ST 20/<: CPT

## 2020-12-30 PROCEDURE — 11043 DBRDMT MUSC&/FSCA 1ST 20/<: CPT | Performed by: SURGERY

## 2020-12-30 RX ORDER — LIDOCAINE HYDROCHLORIDE 40 MG/ML
SOLUTION TOPICAL ONCE
Status: DISCONTINUED | OUTPATIENT
Start: 2020-12-30 | End: 2020-12-31 | Stop reason: HOSPADM

## 2020-12-30 ASSESSMENT — PAIN DESCRIPTION - PROGRESSION: CLINICAL_PROGRESSION: NOT CHANGED

## 2020-12-30 ASSESSMENT — PAIN DESCRIPTION - DESCRIPTORS: DESCRIPTORS: ACHING

## 2020-12-30 ASSESSMENT — PAIN SCALES - GENERAL: PAINLEVEL_OUTOF10: 2

## 2020-12-30 ASSESSMENT — PAIN DESCRIPTION - LOCATION: LOCATION: LEG

## 2020-12-30 ASSESSMENT — PAIN - FUNCTIONAL ASSESSMENT: PAIN_FUNCTIONAL_ASSESSMENT: ACTIVITIES ARE NOT PREVENTED

## 2020-12-30 ASSESSMENT — PAIN DESCRIPTION - PAIN TYPE: TYPE: CHRONIC PAIN

## 2020-12-30 ASSESSMENT — PAIN DESCRIPTION - ORIENTATION: ORIENTATION: RIGHT

## 2020-12-30 ASSESSMENT — PAIN DESCRIPTION - FREQUENCY: FREQUENCY: CONTINUOUS

## 2020-12-30 NOTE — PLAN OF CARE
Problem: Pain:  Goal: Pain level will decrease  Description: Pain level will decrease  Outcome: Ongoing  Goal: Control of acute pain  Description: Control of acute pain  Outcome: Ongoing     Problem: Wound:  Goal: Will show signs of wound healing; wound closure and no evidence of infection  Description: Will show signs of wound healing; wound closure and no evidence of infection  Outcome: Ongoing     Problem: Arterial:  Goal: Optimize blood flow for wound healing  Description: Optimize blood flow for wound healing  Outcome: Met This Shift     Problem: Smoking cessation:  Goal: Ability to formulate a plan to maintain a tobacco-free life will be supported  Description: Ability to formulate a plan to maintain a tobacco-free life will be supported  Outcome: Ongoing     Problem: Weight control:  Goal: Ability to maintain an optimal weight for height and age will be supported  Description: Ability to maintain an optimal weight for height and age will be supported  Outcome: Ongoing     Problem: Falls - Risk of:  Goal: Will remain free from falls  Description: Will remain free from falls  Outcome: Met This Shift

## 2020-12-31 NOTE — PROGRESS NOTES
Wound Healing Center Followup Visit Note    Referring Physician : Nohelia Norris Str RECORD NUMBER:  [id number]  AGE: 63 y.o. GENDER: male  : 1955  EPISODE DATE:  2020    Subjective:     Chief Complaint   Patient presents with    Wound Check     right amp site      HISTORY of PRESENT ILLNESS HPI   Jake Kwok is a 63 y.o. male who presents today in regards to follow up evaluation and treatment of wound/ulcer. That patient's past medical, family and social hx were reviewed and changes were made if present. History of Wound Context:  Pt presents in regards to chronic R above knee amputation wound was since 2020. He had developed postoperatively dehiscence of his wound site which had previously undergone a muscle flap. He was having a packed per home health care. The wound had gotten so small that it was difficult to pack. He is still having drainage significant though. At first visit to wound healing center 20 his wound has not been improving. He was not on abx at initial visit to center.     Previous lower extremity procedures  2018  R AKA for chronic R LE wound   3/20/2020 R groin exploration with iliofemoral artery embolectomy, primary closure of arteriotomy, R deep femoral atery embolectomy   2020 Excisional debridement of infected necrotic  tissue, right groin and right thigh   2020 RIGHT THIGH WOUND DRESSING CHANGE and DEBRIDEMENT   2020 Irrigation and excisional debridement of Right Thigh above knee amputation wound   2020 Irrigation and excisional debridement of the Right thigh above knee amputation wound 25 x45 cm  Excision of prosthetic R LE bypass graft  Ligation of right SFA  Posterior hamstring myocutaneous flap 65 cm flap for open wound of 45 x 25 cm per Dr. [facility]     20  · Wound itself is small - opened up as difficult to pack · 5 cm depth - culture done, significant amount of fluid expressed once opened up  · aquacell ag packing  · Discussed potential need for further surgical exploration in future - pt would like to avoid hospital and OR if possible  9/2/20  · Wound much improved - depth less  · Start doxycyline for staph culture  9/9/20  · Depth 5 cm again, fluid seems less  9/23/20  · Tunneling now 8 cm, concern that hittig bone  · Discussed with patient continuing current reigmen unlikely to result in healing  · Would benefit from further surgical debridement, likely revision of amputation site and possible wound vac  · Patient would like to consider options and will let me know next week  9/30/20  · Significant tunneling still  · Will proceed with or in 2 weeks per pt request  10/21/20  · OR 10/14/20  · dakins moistened kerlex  · Discussed with Dr. Bertha Gil re pain control issues  · Doxycycline 10 days script given  10/28/20  · Pain better controlled  · + Granulation tissue  · Bone less exposed  11/4/20  · Wound health  · Bone still exposed  11/18/20  · Wound continues to be improved  · Less bone exposed  11/25/20  · Bone exposure still an issue  · Will take for surgery - try dermagraft  · If not successful will likely need revision in future  12/9/20  · Bone exposure, wound overall improvd  · Patient did not follow though with testing for surgery  12/17/20  · dermagraft application  82/47/07  · Wound improved, bone still exposed    Wound/Ulcer Pain Timing/Severity: moderate  Quality of pain: aching, throbbing, shooting   Severity:  3 / 10   Modifying Factors: Pain worsens with dressing changes, pressure, debridement  Associated Signs/Symptoms: drainage and pain    Ulcer Identification:  Ulcer Type: arterial, diabetic, pressure and non-healing surgical  Contributing Factors: edema, diabetes, poor glucose control, chronic pressure, decreased mobility, shear force, obesity and arterial insufficiency Diabetic/Pressure/Non Pressure Ulcers only:  Ulcer: Diabetic ulcer, fat layer exposed    Wound: Wound dehiscence        PAST MEDICAL HISTORY      Diagnosis Date    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 ([address] 5/22/2018    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 ([address] 5/21/2018    Critical lower limb ischemia 3/20/2020    Diabetes mellitus ([address]     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 2 diabetes mellitus, with fat layer exposed ([address] 5/22/2018    Disruption of closure of muscle or muscle flap, sequela 8/26/2020    DVT, lower extremity (HCC)     right leg     Gas gangrene of thigh ([address] 3/20/2020    Hyperlipidemia     Hypertension     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[address]     PVD (peripheral vascular disease) ([facility]      Past Surgical History:   Procedure Laterality Date    AMPUTATION ABOVE KNEE Right 4/28/2020    DEBRIDEMENT OF AMPUTATION RIGHT ABOVE KNEE performed by Reyna Pagan MD at [facility] Right 4/30/2020    DEBRIDEMENT OF AMPUTATION RIGHT ABOVE KNEE,  POSS. ABOVE KNEE REVISION, POSS. CLOSURE, POSS.WOUND VAC -- BEN Lowery / Raford Moritz TO LOOK IN performed by Reyna Pagan MD at [place] [address] Right 3/20/2020    RIGHT LOWER EXTREMITY THROMBECTOMY, POSSIBLE ANGIOGRAM, POSSIBLE INTERVENTION, POSSIBLE BYPASS. performed by Reyna Pagan MD at [address] [place] [address] Right 4/17/2020    RIGHT LEG DEBRIDEMENT INCISION AND DRAINAGE performed by Medardo Grace MD at [address] [place] [address] Right 4/20/2020    RIGHT THIGH WOUND DRESSING CHANGE; DEBRIDEMENT, removal of muscle performed by Medardo Grace MD at [address] [place] [address] Right 4/21/2020    RIGHT THIGH WOUND DRESSING CHANGE POSSIBLE  DEBRIDEMENT - NEEDS DRS.  CASH / JANETTE TO SEE performed by Melanie Salomon MD at [address] [place] [address]  LEG SURGERY Right 4/23/2020    RIGHT THIGH WOUND DRESSING CHANGE and DEBRIDEMENT performed by Cuco Ortega MD at [address] Right 10/15/2020    DEBRIDEMENT RIGHT ABOVE KNEE AMPUTATION POSS. REVISION POSS. WOUND VAC performed by Cuco Ortega MD at [address] Right 12/17/2020    DEBRIDEMENT  RIGHT ABOVE KNEE AMPUTATION WITH POSS. ADVANCED SKIN THERAPY performed by Cuco Ortega MD at [facility] Right 11/1/2018    AMPUTATION ABOVE KNEE RIGHT LEG performed by Cuco Ortega MD at [facility] Right 5/24/2018    RIGHT FOOT INCISION AND DRAINAGE WITH PARTIAL BONE RESECTION performed by Romina Gomes DPM at [address] [facility] OFFICE/OUTPT VISIT,PROCEDURE ONLY Right 8/3/2018    INCISION AND DRAINAGE MULTIPLE AREAS RIGHT FOOT WITH DEBRIDEMENT SOFT TISSUE performed by Romina Gomes DPM at [facility] OR    RHINOPLASTY Right     leg      Family History   Problem Relation Age of Onset    Cancer Mother     Diabetes Father     Heart Failure Father     Hypertension Father     Cancer Sister      Social History     Tobacco Use    Smoking status: Current Every Day Smoker     Packs/day: 0.50     Years: 7.00     Pack years: 3.50     Types: Cigarettes    Smokeless tobacco: Never Used   Substance Use Topics    Alcohol use: No     Frequency: Never    Drug use: No     No Known Allergies  Current Outpatient Medications on File Prior to Encounter   Medication Sig Dispense Refill    cloNIDine (CATAPRES) 0.1 MG tablet TAKE ONE TABLET BY MOUTH THREE TIMES DAILY 270 tablet 3    oxyCODONE (ROXICODONE) 5 MG immediate release tablet Take 1 tablet by mouth every 4-6 hours as needed for Pain for up to 30 days.  180 tablet 0    mineral oil-hydrophilic petrolatum (AQUAPHOR) ointment Apply topically twice daily dispense 14 ounce jar 396 g 5  oxyCODONE (OXYCONTIN) 10 MG extended release tablet Take 1 tablet by mouth every 12 hours as needed for Pain for up to 30 days. 60 each 0    mirtazapine (REMERON) 7.5 MG tablet Take 1 tablet by mouth nightly 90 tablet 1    metoprolol tartrate (LOPRESSOR) 25 MG tablet Take 1 tablet by mouth 2 times daily 90 tablet 1    amLODIPine (NORVASC) 10 MG tablet Take 1 tablet by mouth daily 90 tablet 1    gabapentin (NEURONTIN) 400 MG capsule Take 1 capsule by mouth 3 times daily for 30 days. 270 capsule 1    DULoxetine (CYMBALTA) 60 MG extended release capsule Take 2 capsules by mouth every morning 180 capsule 1    blood glucose test strips (ONETOUCH ULTRA) strip 1 each by Other route 3 times daily As needed. 300 each 1    insulin lispro (HUMALOG) 100 UNIT/ML injection vial Inject 0-12 Units into the skin 3 times daily (with meals) 1 vial 3    Insulin Syringe-Needle U-100 (ULTICARE INSULIN SYRINGE) 31G X 5/16\" 0.3 ML MISC 1 each by Other route 5 times daily 100 each 3    cilostazol (PLETAL) 50 MG tablet Take 1 tablet by mouth 2 times daily 60 tablet 3    bisacodyl (DULCOLAX) 5 MG EC tablet Take 1 tablet by mouth daily as needed for Constipation 30 tablet 0    ammonium lactate (LAC-HYDRIN) 12 % lotion Apply topically as needed. 1 Bottle 0    docusate sodium (COLACE) 100 MG capsule Take 1 capsule by mouth 2 times daily (Patient taking differently: Take 100 mg by mouth 2 times daily as needed ) 50 capsule 0    Handicap Placard MISC by Does not apply route Patient cannot walk 200 ft without stopping to rest.    Expiration 10/21/2024 1 each 0    glucose (GLUTOSE) 40 % GEL Take 37.5 mLs by mouth as needed (hypoglycemia) 45 g 1     No current facility-administered medications on file prior to encounter.         REVIEW OF SYSTEMS See HPI    Objective:    BP (!) 146/76   Pulse 76   Temp 98.2 °F (36.8 °C) (Temporal)   Resp 18   Ht 6' 2\" (1.88 m)   Wt 245 lb (111.1 kg)   BMI 31.46 kg/m² Wt Readings from Last 3 Encounters:   12/30/20 245 lb (111.1 kg)   12/17/20 245 lb (111.1 kg)   12/09/20 240 lb (108.9 kg)     PHYSICAL EXAM  CONSTITUTIONAL:   Awake, alert, cooperative   EYES:  lids and lashes normal   ENT: external ears and nose without lesions   NECK:  supple, symmetrical, trachea midline   SKIN:  Open wound Present    Assessment:     Problem List Items Addressed This Visit     Disruption of closure of muscle or muscle flap, sequela (Chronic)          Pre Debridement Measurements:  Are located in the [place]  Documentation Flow Sheet  Post Debridement Measurements:  Wound/Ulcer Descriptions are Pre Debridement except measurements:     Wound 12/17/20 Thigh Anterior;Right (Active)   Dressing Status New dressing applied 12/30/20 1217   Wound Cleansed Cleansed with saline 12/30/20 1217   Dressing/Treatment Alginate;ABD 12/30/20 1217   Post-Procedure Length (cm) 3.5 cm 12/30/20 1213   Post-Procedure Width (cm) 3.7 cm 12/30/20 1213   Post-Procedure Depth (cm) 5.7 cm 12/30/20 1213   Post-Procedure Surface Area (cm^2) 12.95 cm^2 12/30/20 1213   Post-Procedure Volume (cm^3) 73.82 cm^3 12/30/20 1213   Wound Assessment Fibrin;Pink/red 12/30/20 1149   Drainage Amount Large 12/30/20 1149   Drainage Description Yellow 12/30/20 1149   Odor None 12/30/20 1149   Nadia-wound Assessment Maceration 12/30/20 1149   Number of days: 13     Incision 04/28/20 Thigh Right;Medial (Active)   Number of days: 247   Procedure Note  Indications:  Based on my examination of this patient's wound(s)/ulcer(s) today, debridement is required to promote healing and evaluate the wound base.     Performed by: Joaquin Napier MD    Consent obtained:  Yes    Time out taken:  Yes    Pain Control: Anesthetic  Anesthetic: 4% Lidocaine Liquid Topical     Debridement:Excisional Debridement    Using curette the wound(s)/ulcer(s) was/were sharply debrided down through and including the removal of epidermis, dermis, subq, muscle Devitalized Tissue Debrided:  fibrin, biofilm, slough and exudate to stimulate bleeding to promote healing, post debridement good bleeding base and wound edges noted    Wound/Ulcer #: 1    Percent of Wound/Ulcer Debrided: 100%    Total Surface Area Debrided: 12.95 sq cm     Estimated Blood Loss:  Minimal  Hemostasis Achieved:  by pressure    Procedural Pain:  5  / 10   Post Procedural Pain:  3 / 10      Response to treatment:  Well tolerated    Plan:   Treatment Note please see attached Discharge Instructions    Written patient dismissal instructions given to patient and signed by patient or POA. Discharge Instructions       Visit Discharge/Physician Orders     Discharge condition: Stable     Assessment of pain at discharge:yes     Anesthetic used: 4% lidocaine soln.     Discharge to: Home     Left via:Private automobile     Accompanied by: spouse     [facility]     Dressing Orders:RIGHT AKA SITE-Cleanse with normal saline, pack loosely with calcium alginate, dry dressing and secure. Change daily.     Treatment Orders:Eat a diet high in protein and vitamin C. Take a multiple vitamin daily unless contraindicated.      [address] [place] [address] followup visit: 1 week _____________________________  (Please note your next appointment above and if you are unable to keep, kindly give a 24 hour notice.  Thank you.)     Physician signature:__________________________  [facility]  If you experience any of the following, please call the [facility] during business hours:     * Increase in Pain  * Temperature over 101  * Increase in drainage from your wound  * Drainage with a foul odor  * Bleeding  * Increase in swelling  * Need for compression bandage changes due to slippage, breakthrough drainage.     If you need medical attention outside of the business hours of the [address] please contact your PCP or go to the nearest emergency room.                                                     Electronically signed by Callie Gallardo MD on 12/31/2020 at 12:19 AM

## 2021-01-06 ENCOUNTER — HOSPITAL ENCOUNTER (OUTPATIENT)
Dept: WOUND CARE | Age: 66
Discharge: HOME | End: 2021-01-06
Payer: COMMERCIAL

## 2021-01-06 VITALS
WEIGHT: 245 LBS | BODY MASS INDEX: 31.44 KG/M2 | SYSTOLIC BLOOD PRESSURE: 138 MMHG | DIASTOLIC BLOOD PRESSURE: 70 MMHG | HEIGHT: 74 IN | RESPIRATION RATE: 18 BRPM | TEMPERATURE: 98.9 F | HEART RATE: 82 BPM

## 2021-01-06 DIAGNOSIS — L97.112: Primary | ICD-10-CM

## 2021-01-06 DIAGNOSIS — T81.32XS: ICD-10-CM

## 2021-01-06 PROCEDURE — 11043 DBRDMT MUSC&/FSCA 1ST 20/<: CPT

## 2021-01-06 PROCEDURE — 11043 DBRDMT MUSC&/FSCA 1ST 20/<: CPT | Performed by: SURGERY

## 2021-01-06 RX ORDER — LIDOCAINE HYDROCHLORIDE 40 MG/ML
SOLUTION TOPICAL ONCE
Status: DISCONTINUED | OUTPATIENT
Start: 2021-01-06 | End: 2021-01-06

## 2021-01-06 RX ORDER — LIDOCAINE HYDROCHLORIDE 40 MG/ML
SOLUTION TOPICAL ONCE
Status: DISCONTINUED | OUTPATIENT
Start: 2021-01-06 | End: 2021-01-07 | Stop reason: HOSPADM

## 2021-01-06 ASSESSMENT — PAIN DESCRIPTION - DESCRIPTORS: DESCRIPTORS: SORE;SHARP

## 2021-01-06 ASSESSMENT — PAIN DESCRIPTION - LOCATION: LOCATION: LEG

## 2021-01-06 ASSESSMENT — PAIN SCALES - GENERAL: PAINLEVEL_OUTOF10: 5

## 2021-01-06 ASSESSMENT — PAIN DESCRIPTION - FREQUENCY: FREQUENCY: CONTINUOUS

## 2021-01-06 NOTE — PROGRESS NOTES
Wound Healing Center Followup Visit Note    Referring Physician : Nohelia Norris Str RECORD NUMBER:  [id number]  AGE: 63 y.o. GENDER: male  : 1955  EPISODE DATE:  2021    Subjective:     Chief Complaint   Patient presents with    Wound Check     right amp site      HISTORY of PRESENT ILLNESS HPI   Devyn Coe is a 63 y.o. male who presents today in regards to follow up evaluation and treatment of wound/ulcer. That patient's past medical, family and social hx were reviewed and changes were made if present. History of Wound Context:  Pt presents in regards to chronic R above knee amputation wound was since 2020. He had developed postoperatively dehiscence of his wound site which had previously undergone a muscle flap. He was having a packed per home health care. The wound had gotten so small that it was difficult to pack. He is still having drainage significant though. At first visit to wound healing center 20 his wound has not been improving. He was not on abx at initial visit to center.     Previous lower extremity procedures  2018  R AKA for chronic R LE wound   3/20/2020 R groin exploration with iliofemoral artery embolectomy, primary closure of arteriotomy, R deep femoral atery embolectomy   2020 Excisional debridement of infected necrotic  tissue, right groin and right thigh   2020 RIGHT THIGH WOUND DRESSING CHANGE and DEBRIDEMENT   2020 Irrigation and excisional debridement of Right Thigh above knee amputation wound   2020 Irrigation and excisional debridement of the Right thigh above knee amputation wound 25 x45 cm  Excision of prosthetic R LE bypass graft  Ligation of right SFA  Posterior hamstring myocutaneous flap 65 cm flap for open wound of 45 x 25 cm per Dr. 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 ([address] 5/22/2018    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 ([address] 5/21/2018    Critical lower limb ischemia 3/20/2020    Diabetes mellitus ([address]     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 2 diabetes mellitus, with fat layer exposed ([address] 5/22/2018    Disruption of closure of muscle or muscle flap, sequela 8/26/2020    DVT, lower extremity (HCC)     right leg     Gas gangrene of thigh ([address] 3/20/2020    Hyperlipidemia     Hypertension     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[address]     PVD (peripheral vascular disease) ([address]      Past Surgical History:   Procedure Laterality Date    AMPUTATION ABOVE KNEE Right 4/28/2020    DEBRIDEMENT OF AMPUTATION RIGHT ABOVE KNEE performed by Beth Gu MD at [address] [place] [address] Right 4/30/2020    DEBRIDEMENT OF AMPUTATION RIGHT ABOVE KNEE,  POSS. ABOVE KNEE REVISION, POSS. CLOSURE, POSS.WOUND VAC -- BEN Hanna / Colt Stack TO LOOK IN performed by Beth Gu MD at [place] [address] Right 3/20/2020    RIGHT LOWER EXTREMITY THROMBECTOMY, POSSIBLE ANGIOGRAM, POSSIBLE INTERVENTION, POSSIBLE BYPASS. performed by Beth Gu MD at [address] [place] [address] Right 4/17/2020    RIGHT LEG DEBRIDEMENT INCISION AND DRAINAGE performed by Roxy Bamberger, MD at [place] [address] Right 4/20/2020    RIGHT THIGH WOUND DRESSING CHANGE; DEBRIDEMENT, removal of muscle performed by Roxy Bamberger, MD at [address] [place] [address] Right 4/21/2020    RIGHT THIGH WOUND DRESSING CHANGE POSSIBLE  DEBRIDEMENT - NEEDS BEN Hanna / JANETTE RON SEE performed by Obdulia Romo MD at [address] [place] [address] Right 4/23/2020    RIGHT THIGH WOUND DRESSING CHANGE and DEBRIDEMENT (REMERON) 7.5 MG tablet Take 1 tablet by mouth nightly 90 tablet 1    metoprolol tartrate (LOPRESSOR) 25 MG tablet Take 1 tablet by mouth 2 times daily 90 tablet 1    amLODIPine (NORVASC) 10 MG tablet Take 1 tablet by mouth daily 90 tablet 1    gabapentin (NEURONTIN) 400 MG capsule Take 1 capsule by mouth 3 times daily for 30 days. 270 capsule 1    DULoxetine (CYMBALTA) 60 MG extended release capsule Take 2 capsules by mouth every morning 180 capsule 1    blood glucose test strips (ONETOUCH ULTRA) strip 1 each by Other route 3 times daily As needed. 300 each 1    insulin lispro (HUMALOG) 100 UNIT/ML injection vial Inject 0-12 Units into the skin 3 times daily (with meals) 1 vial 3    Insulin Syringe-Needle U-100 (ULTICARE INSULIN SYRINGE) 31G X 5/16\" 0.3 ML MISC 1 each by Other route 5 times daily 100 each 3    cilostazol (PLETAL) 50 MG tablet Take 1 tablet by mouth 2 times daily 60 tablet 3    bisacodyl (DULCOLAX) 5 MG EC tablet Take 1 tablet by mouth daily as needed for Constipation 30 tablet 0    ammonium lactate (LAC-HYDRIN) 12 % lotion Apply topically as needed. 1 Bottle 0    docusate sodium (COLACE) 100 MG capsule Take 1 capsule by mouth 2 times daily (Patient taking differently: Take 100 mg by mouth 2 times daily as needed ) 50 capsule 0    Handicap Placard MISC by Does not apply route Patient cannot walk 200 ft without stopping to rest.    Expiration 10/21/2024 1 each 0    glucose (GLUTOSE) 40 % GEL Take 37.5 mLs by mouth as needed (hypoglycemia) 45 g 1     No current facility-administered medications on file prior to encounter.         REVIEW OF SYSTEMS See HPI    Objective:    /70   Pulse 82   Temp 98.9 °F (37.2 °C) (Temporal)   Resp 18   Ht 6' 2\" (1.88 m)   Wt 245 lb (111.1 kg)   BMI 31.46 kg/m²   Wt Readings from Last 3 Encounters:   01/06/21 245 lb (111.1 kg)   12/30/20 245 lb (111.1 kg)   12/17/20 245 lb (111.1 kg)     PHYSICAL EXAM  CONSTITUTIONAL:   Awake, alert, cooperative stimulate bleeding to promote healing, post debridement good bleeding base and wound edges noted    Wound/Ulcer #: 1    Percent of Wound/Ulcer Debrided: 100%    Total Surface Area Debrided: 9.3 sq cm     Estimated Blood Loss:  Minimal  Hemostasis Achieved:  by pressure    Procedural Pain:  5  / 10   Post Procedural Pain:  3 / 10      Response to treatment:  Well tolerated    Plan:   Treatment Note please see attached Discharge Instructions    Written patient dismissal instructions given to patient and signed by patient or POA. Discharge Instructions       Visit Discharge/Physician Orders     Discharge condition: Stable     Assessment of pain at discharge:yes     Anesthetic used: 4% lidocaine soln.     Discharge to: Home     Left via:Private automobile     Accompanied by: spouse     [facility]: [facility]     Dressing Orders:RIGHT AKA SITE-Cleanse with normal saline, pack loosely with calcium alginate, dry dressing and secure. Change daily.     Treatment Orders:Eat a diet high in protein and vitamin C. Take a multiple vitamin daily unless contraindicated.      [facility] followup visit: 1 week _____________________________  (Please note your next appointment above and if you are unable to keep, kindly give a 24 hour notice.  Thank you.)     Physician signature:__________________________  Kenisha Faustin  If you experience any of the following, please call the [address] [place] [address] [employer] during business hours:     * Increase in Pain  * Temperature over 101  * Increase in drainage from your wound  * Drainage with a foul odor  * Bleeding  * Increase in swelling  * Need for compression bandage changes due to slippage, breakthrough drainage.     If you need medical attention outside of the business hours of the [address] [place] [address] [employer] please contact your PCP or go to the nearest emergency room.                                                                     Electronically signed by Marlee Morton MD on 1/6/2021 at 11:38 AM

## 2021-01-13 ENCOUNTER — HOSPITAL ENCOUNTER (OUTPATIENT)
Dept: WOUND CARE | Age: 66
Discharge: HOME | End: 2021-01-13
Payer: COMMERCIAL

## 2021-01-13 VITALS
RESPIRATION RATE: 20 BRPM | SYSTOLIC BLOOD PRESSURE: 152 MMHG | DIASTOLIC BLOOD PRESSURE: 70 MMHG | HEART RATE: 84 BPM | TEMPERATURE: 97.8 F

## 2021-01-13 DIAGNOSIS — L97.112: ICD-10-CM

## 2021-01-13 DIAGNOSIS — T81.32XS: Primary | ICD-10-CM

## 2021-01-13 PROCEDURE — 11043 DBRDMT MUSC&/FSCA 1ST 20/<: CPT | Performed by: SURGERY

## 2021-01-13 PROCEDURE — 11043 DBRDMT MUSC&/FSCA 1ST 20/<: CPT

## 2021-01-13 RX ORDER — LIDOCAINE HYDROCHLORIDE 40 MG/ML
SOLUTION TOPICAL ONCE
Status: DISCONTINUED | OUTPATIENT
Start: 2021-01-13 | End: 2021-01-14 | Stop reason: HOSPADM

## 2021-01-13 ASSESSMENT — PAIN DESCRIPTION - DESCRIPTORS: DESCRIPTORS: SORE;THROBBING

## 2021-01-13 ASSESSMENT — PAIN - FUNCTIONAL ASSESSMENT: PAIN_FUNCTIONAL_ASSESSMENT: ACTIVITIES ARE NOT PREVENTED

## 2021-01-13 ASSESSMENT — PAIN DESCRIPTION - LOCATION: LOCATION: LEG

## 2021-01-15 DIAGNOSIS — S78.111A ABOVE KNEE AMPUTATION OF RIGHT LOWER EXTREMITY (HCC): ICD-10-CM

## 2021-01-15 DIAGNOSIS — G89.4 CHRONIC PAIN SYNDROME: ICD-10-CM

## 2021-01-15 RX ORDER — OXYCODONE HYDROCHLORIDE 5 MG/1
5 TABLET ORAL
Qty: 180 TABLET | Refills: 0 | Status: SHIPPED
Start: 2021-01-15 | End: 2021-02-15 | Stop reason: SDUPTHER

## 2021-01-15 RX ORDER — AMMONIUM LACTATE 12 G/100G
LOTION TOPICAL
Qty: 1 BOTTLE | Refills: 0 | Status: SHIPPED
Start: 2021-01-15 | End: 2021-02-25 | Stop reason: SDUPTHER

## 2021-01-15 RX ORDER — CILOSTAZOL 50 MG/1
50 TABLET ORAL 2 TIMES DAILY
Qty: 180 TABLET | Refills: 1 | Status: SHIPPED
Start: 2021-01-15 | End: 2021-07-08 | Stop reason: SDUPTHER

## 2021-01-15 RX ORDER — OXYCODONE HYDROCHLORIDE 5 MG/1
5 TABLET ORAL EVERY 6 HOURS PRN
Qty: 180 TABLET | Refills: 0 | OUTPATIENT
Start: 2021-01-15 | End: 2021-02-14

## 2021-01-15 RX ORDER — CLONIDINE HYDROCHLORIDE 0.1 MG/1
TABLET ORAL
Qty: 270 TABLET | Refills: 3 | Status: SHIPPED
Start: 2021-01-15 | End: 2021-09-28 | Stop reason: SDUPTHER

## 2021-01-18 LAB
FUNGUS (MYCOLOGY) CULTURE: NORMAL
FUNGUS STAIN: NORMAL

## 2021-01-19 DIAGNOSIS — I10 HYPERTENSION, UNSPECIFIED TYPE: Primary | ICD-10-CM

## 2021-01-19 RX ORDER — CHLORTHALIDONE 25 MG/1
25 TABLET ORAL DAILY
Qty: 30 TABLET | Refills: 3 | Status: SHIPPED
Start: 2021-01-19 | End: 2021-04-29

## 2021-01-19 NOTE — TELEPHONE ENCOUNTER
Pt calling in his BP readings, because they have been running higher.  He is wondering if you are going to increase his amlodipine     154/88  1/07/21  152/80  1/09/21  150/80  1/10/21  164/70  1/11/21  158/68  1/12/21  158/84  1/13/21  142/68  1/14/21  170/79  1/15/21  160/80  1/16/21  180/80  1/17/21

## 2021-01-19 NOTE — PROGRESS NOTES
Wound Healing Center Followup Visit Note    Referring Physician : Nohelia Mcraesunday [address] Str RECORD NUMBER:  [id number]  AGE: 63 y.o. GENDER: male  : 1955  EPISODE DATE:  2021    Subjective:     No chief complaint on file. HISTORY of PRESENT ILLNESS HPI   Edward French is a 63 y.o. male who presents today in regards to follow up evaluation and treatment of wound/ulcer. That patient's past medical, family and social hx were reviewed and changes were made if present. History of Wound Context:  Pt presents in regards to chronic R above knee amputation wound was since 2020. He had developed postoperatively dehiscence of his wound site which had previously undergone a muscle flap. He was having a packed per home health care. The wound had gotten so small that it was difficult to pack. He is still having drainage significant though. At first visit to wound healing center 20 his wound has not been improving. He was not on abx at initial visit to center.     Previous lower extremity procedures  2018  R AKA for chronic R LE wound   3/20/2020 R groin exploration with iliofemoral artery embolectomy, primary closure of arteriotomy, R deep femoral atery embolectomy   2020 Excisional debridement of infected necrotic  tissue, right groin and right thigh   2020 RIGHT THIGH WOUND DRESSING CHANGE and DEBRIDEMENT   2020 Irrigation and excisional debridement of Right Thigh above knee amputation wound   2020 Irrigation and excisional debridement of the Right thigh above knee amputation wound 25 x45 cm  Excision of prosthetic R LE bypass graft  Ligation of right SFA  Posterior hamstring myocutaneous flap 65 cm flap for open wound of 45 x 25 cm per Dr. Cali Ba     20  · Wound itself is small - opened up as difficult to pack  · 5 cm depth - culture done, significant amount of fluid expressed once opened up  · Dailysingle packing  · Discussed potential need for further surgical exploration in future - pt would like to avoid hospital and OR if possible  9/2/20  · Wound much improved - depth less  · Start doxycyline for staph culture  9/9/20  · Depth 5 cm again, fluid seems less  9/23/20  · Tunneling now 8 cm, concern that hittig bone  · Discussed with patient continuing current reigmen unlikely to result in healing  · Would benefit from further surgical debridement, likely revision of amputation site and possible wound vac  · Patient would like to consider options and will let me know next week  9/30/20  · Significant tunneling still  · Will proceed with or in 2 weeks per pt request  10/21/20  · OR 10/14/20  · dakins moistened kerlex  · Discussed with Dr. Garrison Free re pain control issues  · Doxycycline 10 days script given  10/28/20  · Pain better controlled  · + Granulation tissue  · Bone less exposed  11/4/20  · Wound health  · Bone still exposed  11/18/20  · Wound continues to be improved  · Less bone exposed  11/25/20  · Bone exposure still an issue  · Will take for surgery - try dermagraft  · If not successful will likely need revision in future  12/9/20  · Bone exposure, wound overall improvd  · Patient did not follow though with testing for surgery  12/17/20  · dermagraft application  [birth year]  · Wound improved, bone still exposed  1/6/21  · Wound improved, bone still exposed  1/13/21  · Bone almost completely covered    Wound/Ulcer Pain Timing/Severity: moderate  Quality of pain: aching, throbbing, shooting   Severity:  3 / 10   Modifying Factors: Pain worsens with dressing changes, pressure, debridement  Associated Signs/Symptoms: drainage and pain    Ulcer Identification:  Ulcer Type: arterial, diabetic, pressure and non-healing surgical  Contributing Factors: edema, diabetes, poor glucose control, chronic pressure, decreased mobility, shear force, obesity and arterial insufficiency    Diabetic/Pressure/Non Pressure Ulcers only:  Ulcer: Diabetic ulcer, fat layer exposed    Wound: Wound dehiscence        PAST MEDICAL HISTORY      Diagnosis Date    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 ([address] 5/22/2018    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 ([address] 5/21/2018    Critical lower limb ischemia 3/20/2020    Diabetes mellitus ([address]     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 2 diabetes mellitus, with fat layer exposed ([address] 5/22/2018    Disruption of closure of muscle or muscle flap, sequela 8/26/2020    DVT, lower extremity (HCC)     right leg     Gas gangrene of thigh ([address] 3/20/2020    Hyperlipidemia     Hypertension     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[address]     PVD (peripheral vascular disease) ([address]      Past Surgical History:   Procedure Laterality Date    AMPUTATION ABOVE KNEE Right 4/28/2020    DEBRIDEMENT OF AMPUTATION RIGHT ABOVE KNEE performed by Mariia Brizuela MD at [address] [place] [address] Right 4/30/2020    DEBRIDEMENT OF AMPUTATION RIGHT ABOVE KNEE,  POSS. ABOVE KNEE REVISION, POSS. CLOSURE, POSS.WOUND VAC -- BEN Paul / Jhonathan Vivar TO LOOK IN performed by Mariia Brizuela MD at [place] [address] Right 3/20/2020    RIGHT LOWER EXTREMITY THROMBECTOMY, POSSIBLE ANGIOGRAM, POSSIBLE INTERVENTION, POSSIBLE BYPASS. performed by Mariia Brizuela MD at [address] [place] [address] Right 4/17/2020    RIGHT LEG DEBRIDEMENT INCISION AND DRAINAGE performed by Cyndie Owens MD at [address] [place] [address] Right 4/20/2020    RIGHT THIGH WOUND DRESSING CHANGE; DEBRIDEMENT, removal of muscle performed by Cyndie Owens MD at [address] Right 4/21/2020    RIGHT THIGH WOUND DRESSING CHANGE POSSIBLE  DEBRIDEMENT - NEEDS BEN Paul / JANETTE TO SEE performed by Tiffany Nicholas MD at [address] Right 4/23/2020    RIGHT THIGH WOUND DRESSING CHANGE and DEBRIDEMENT performed by Negro Negron MD at [address] Right 10/15/2020    DEBRIDEMENT RIGHT ABOVE KNEE AMPUTATION POSS. REVISION POSS. WOUND VAC performed by Negro Negron MD at [address] Right 12/17/2020    DEBRIDEMENT  RIGHT ABOVE KNEE AMPUTATION WITH POSS. ADVANCED SKIN THERAPY performed by Negro Negron MD at [address] Right 11/1/2018    AMPUTATION ABOVE KNEE RIGHT LEG performed by Negro Negron MD at [facility] Right 5/24/2018    RIGHT FOOT INCISION AND DRAINAGE WITH PARTIAL BONE RESECTION performed by Anyi Mccullough DPM at [address] OFFICE/OUTPT VISIT,PROCEDURE ONLY Right 8/3/2018    INCISION AND DRAINAGE MULTIPLE AREAS RIGHT FOOT WITH DEBRIDEMENT SOFT TISSUE performed by Anyi Mccullough DPM at [facility] Right     leg      Family History   Problem Relation Age of Onset    Cancer Mother     Diabetes Father     Heart Failure Father     Hypertension Father     Cancer Sister      Social History     Tobacco Use    Smoking status: Current Every Day Smoker     Packs/day: 0.50     Years: 7.00     Pack years: 3.50     Types: Cigarettes    Smokeless tobacco: Never Used   Substance Use Topics    Alcohol use: No     Frequency: Never    Drug use: No     No Known Allergies  Current Outpatient Medications on File Prior to Encounter   Medication Sig Dispense Refill    mineral oil-hydrophilic petrolatum (AQUAPHOR) ointment Apply topically twice daily dispense 14 ounce jar 396 g 5    mirtazapine (REMERON) 7.5 MG tablet Take 1 tablet by mouth nightly 90 tablet 1    metoprolol tartrate (LOPRESSOR) 25 MG tablet Take 1 tablet by mouth 2 times daily 90 tablet 1    amLODIPine (NORVASC) 10 MG tablet Take 1 tablet by mouth daily 90 tablet 1    gabapentin (NEURONTIN) 400 MG capsule Take 1 capsule by mouth 3 times daily for 30 days.  270 capsule 1    DULoxetine (CYMBALTA) 60 MG extended release capsule Take 2 capsules by mouth every morning 180 capsule 1    blood glucose test strips (ONETOUCH ULTRA) strip 1 each by Other route 3 times daily As needed. 300 each 1    insulin lispro (HUMALOG) 100 UNIT/ML injection vial Inject 0-12 Units into the skin 3 times daily (with meals) 1 vial 3    Insulin Syringe-Needle U-100 (ULTICARE INSULIN SYRINGE) 31G X 5/16\" 0.3 ML MISC 1 each by Other route 5 times daily 100 each 3    glucose (GLUTOSE) 40 % GEL Take 37.5 mLs by mouth as needed (hypoglycemia) 45 g 1    bisacodyl (DULCOLAX) 5 MG EC tablet Take 1 tablet by mouth daily as needed for Constipation 30 tablet 0    docusate sodium (COLACE) 100 MG capsule Take 1 capsule by mouth 2 times daily (Patient taking differently: Take 100 mg by mouth 2 times daily as needed ) 50 capsule 0    Handicap Placard MISC by Does not apply route Patient cannot walk 200 ft without stopping to rest.    Expiration 10/21/2024 1 each 0     No current facility-administered medications on file prior to encounter.         REVIEW OF SYSTEMS See HPI    Objective:    BP (!) 152/70   Pulse 84   Temp 97.8 °F (36.6 °C) (Temporal)   Resp 20   Wt Readings from Last 3 Encounters:   01/06/21 245 lb (111.1 kg)   12/30/20 245 lb (111.1 kg)   12/17/20 245 lb (111.1 kg)     PHYSICAL EXAM  CONSTITUTIONAL:   Awake, alert, cooperative   EYES:  lids and lashes normal   ENT: external ears and nose without lesions   NECK:  supple, symmetrical, trachea midline   SKIN:  Open wound Present    Assessment:     Problem List Items Addressed This Visit     Disruption of closure of muscle or muscle flap, sequela - Primary (Chronic)    Ischemic ulcer of right thigh with fat layer exposed ([address]          Pre Debridement Measurements:  Are located in the [place]  Documentation Flow Sheet  Post Debridement Measurements:  Wound/Ulcer Descriptions are Pre Debridement except measurements:     Wound 12/17/20 Thigh Anterior;Right (Active)   Wound Image 01/06/21 1039   Dressing Status New dressing applied 01/06/21 1141   Wound Cleansed Cleansed with saline 01/06/21 1141   Dressing/Treatment Alginate;ABD 01/06/21 1141   Wound Length (cm) 2.9 cm 01/13/21 1045   Wound Width (cm) 2.7 cm 01/13/21 1045   Wound Depth (cm) 3.6 cm 01/13/21 1045   Wound Surface Area (cm^2) 7.83 cm^2 01/13/21 1045   Wound Volume (cm^3) 28.19 cm^3 01/13/21 1045   Post-Procedure Length (cm) 2.9 cm 01/13/21 1109   Post-Procedure Width (cm) 2.7 cm 01/13/21 1109   Post-Procedure Depth (cm) 3.8 cm 01/13/21 1109   Post-Procedure Surface Area (cm^2) 7.83 cm^2 01/13/21 1109   Post-Procedure Volume (cm^3) 29.75 cm^3 01/13/21 1109   Wound Assessment Fibrin;Pink/red 01/13/21 1045   Drainage Amount Large 01/13/21 1045   Drainage Description Thick; Serosanguinous; Yellow;Green 01/13/21 1045   Odor Mild 01/13/21 1045   Nadia-wound Assessment Maceration 01/13/21 1045   Number of days: 33     Incision 04/28/20 Thigh Right;Medial (Active)   Number of days: 266   Procedure Note  Indications:  Based on my examination of this patient's wound(s)/ulcer(s) today, debridement is required to promote healing and evaluate the wound base.     Performed by: Cindy Florian MD    Consent obtained:  Yes    Time out taken:  Yes    Pain Control: Anesthetic  Anesthetic: 4% Lidocaine Liquid Topical     Debridement:Excisional Debridement    Using curette the wound(s)/ulcer(s) was/were sharply debrided down through and including the removal of epidermis, dermis, subq, muscle    Devitalized Tissue Debrided:  fibrin, biofilm, slough and exudate to stimulate bleeding to promote healing, post debridement good bleeding base and wound edges noted    Wound/Ulcer #: 1    Percent of Wound/Ulcer Debrided: 100%    Total Surface Area Debrided: 7.83 sq cm     Estimated Blood Loss:  Minimal  Hemostasis Achieved:  by pressure    Procedural Pain:  5  / 10   Post Procedural Pain:  3 / 10      Response to treatment:  Well tolerated    Plan:

## 2021-01-20 ENCOUNTER — HOSPITAL ENCOUNTER (OUTPATIENT)
Dept: WOUND CARE | Age: 66
Discharge: HOME | End: 2021-01-20
Payer: COMMERCIAL

## 2021-01-25 RX ORDER — AMLODIPINE BESYLATE 10 MG/1
10 TABLET ORAL DAILY
Qty: 90 TABLET | Refills: 1 | Status: SHIPPED
Start: 2021-01-25 | End: 2021-08-24 | Stop reason: SDUPTHER

## 2021-01-26 ENCOUNTER — TELEPHONE (OUTPATIENT)
Dept: PRIMARY CARE CLINIC | Age: 66
End: 2021-01-26

## 2021-01-26 DIAGNOSIS — I10 HYPERTENSION, UNSPECIFIED TYPE: Primary | ICD-10-CM

## 2021-01-26 DIAGNOSIS — S78.111A ABOVE KNEE AMPUTATION OF RIGHT LOWER EXTREMITY (HCC): ICD-10-CM

## 2021-01-26 DIAGNOSIS — G89.4 CHRONIC PAIN SYNDROME: ICD-10-CM

## 2021-01-26 RX ORDER — HYDRALAZINE HYDROCHLORIDE 25 MG/1
25 TABLET, FILM COATED ORAL 3 TIMES DAILY
Qty: 90 TABLET | Refills: 3 | Status: SHIPPED
Start: 2021-01-26 | End: 2021-06-08

## 2021-01-26 RX ORDER — OXYCODONE HCL 10 MG/1
10 TABLET, FILM COATED, EXTENDED RELEASE ORAL EVERY 12 HOURS PRN
Qty: 60 EACH | Refills: 0 | Status: SHIPPED
Start: 2021-01-26 | End: 2021-02-22 | Stop reason: SDUPTHER

## 2021-01-26 NOTE — TELEPHONE ENCOUNTER
Patient calling with blood pressures over the last week. 1/21- 158/90 1/22- 180/80 1/23- 150/90 1/25- 170/84 and today was 180/90. This is with taking new medication.

## 2021-01-27 ENCOUNTER — HOSPITAL ENCOUNTER (OUTPATIENT)
Dept: WOUND CARE | Age: 66
Discharge: HOME | End: 2021-01-27
Payer: COMMERCIAL

## 2021-01-27 VITALS — TEMPERATURE: 97.9 F | HEART RATE: 72 BPM | RESPIRATION RATE: 20 BRPM

## 2021-01-27 DIAGNOSIS — L97.112: ICD-10-CM

## 2021-01-27 DIAGNOSIS — T81.32XS: Primary | Chronic | ICD-10-CM

## 2021-01-27 PROCEDURE — 11042 DBRDMT SUBQ TIS 1ST 20SQCM/<: CPT

## 2021-01-27 PROCEDURE — 11042 DBRDMT SUBQ TIS 1ST 20SQCM/<: CPT | Performed by: SURGERY

## 2021-01-27 RX ORDER — LIDOCAINE HYDROCHLORIDE 40 MG/ML
SOLUTION TOPICAL ONCE
Status: DISCONTINUED | OUTPATIENT
Start: 2021-01-27 | End: 2021-01-28 | Stop reason: HOSPADM

## 2021-01-27 NOTE — PROGRESS NOTES
Wound Healing Center Followup Visit Note    Referring Physician : Nohelia Norris Str RECORD NUMBER:  [id number]  AGE: 63 y.o. GENDER: male  : 1955  EPISODE DATE:  2021    Subjective:     Chief Complaint   Patient presents with    Wound Check      HISTORY of PRESENT ILLNESS HPI   Mt Ramírez is a 63 y.o. male who presents today in regards to follow up evaluation and treatment of wound/ulcer. That patient's past medical, family and social hx were reviewed and changes were made if present. History of Wound Context:  Pt presents in regards to chronic R above knee amputation wound was since 2020. He had developed postoperatively dehiscence of his wound site which had previously undergone a muscle flap. He was having a packed per home health care. The wound had gotten so small that it was difficult to pack. He is still having drainage significant though. At first visit to wound healing center 20 his wound has not been improving. He was not on abx at initial visit to center.     Previous lower extremity procedures  2018  R AKA for chronic R LE wound   3/20/2020 R groin exploration with iliofemoral artery embolectomy, primary closure of arteriotomy, R deep femoral atery embolectomy   2020 Excisional debridement of infected necrotic  tissue, right groin and right thigh   2020 RIGHT THIGH WOUND DRESSING CHANGE and DEBRIDEMENT   2020 Irrigation and excisional debridement of Right Thigh above knee amputation wound   2020 Irrigation and excisional debridement of the Right thigh above knee amputation wound 25 x45 cm  Excision of prosthetic R LE bypass graft  Ligation of right SFA  Posterior hamstring myocutaneous flap 65 cm flap for open wound of 45 x 25 cm per Dr. Chavez May     8/26/20  · Wound itself is small - opened up as difficult to pack  · 5 cm depth - culture done, significant amount of fluid expressed once opened up  · Ketch[place]p packing  · Discussed potential need for further surgical exploration in future - pt would like to avoid hospital and OR if possible  9/2/20  · Wound much improved - depth less  · Start doxycyline for staph culture  9/9/20  · Depth 5 cm again, fluid seems less  9/23/20  · Tunneling now 8 cm, concern that hittig bone  · Discussed with patient continuing current reigmen unlikely to result in healing  · Would benefit from further surgical debridement, likely revision of amputation site and possible wound vac  · Patient would like to consider options and will let me know next week  9/30/20  · Significant tunneling still  · Will proceed with or in 2 weeks per pt request  10/21/20  · OR 10/14/20  · dakins moistened kerlex  · Discussed with Dr. Kymberly Mcintyre re pain control issues  · Doxycycline 10 days script given  10/28/20  · Pain better controlled  · + Granulation tissue  · Bone less exposed  11/4/20  · Wound health  · Bone still exposed  11/18/20  · Wound continues to be improved  · Less bone exposed  11/25/20  · Bone exposure still an issue  · Will take for surgery - try dermagraft  · If not successful will likely need revision in future  12/9/20  · Bone exposure, wound overall improvd  · Patient did not follow though with testing for surgery  12/17/20  · dermagraft application  25/94/03  · Wound improved, bone still exposed  1/6/21  · Wound improved, bone still exposed  1/13/21  · Bone almost completely covered  1/27/21  · Bone covered, wound improved    Wound/Ulcer Pain Timing/Severity: moderate  Quality of pain: aching, throbbing, shooting   Severity:  3 / 10   Modifying Factors: Pain worsens with dressing changes, pressure, debridement  Associated Signs/Symptoms: drainage and pain    Ulcer Identification:  Ulcer Type: arterial, diabetic, pressure and non-healing surgical  Contributing Factors: edema, diabetes, poor glucose control, chronic pressure, decreased mobility, shear force, obesity and arterial insufficiency    Diabetic/Pressure/Non Pressure Ulcers only:  Ulcer: Diabetic ulcer, fat layer exposed    Wound: Wound dehiscence        PAST MEDICAL HISTORY      Diagnosis Date    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 ([address] 5/22/2018    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 ([address] 5/21/2018    Critical lower limb ischemia 3/20/2020    Diabetes mellitus ([address]     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 2 diabetes mellitus, with fat layer exposed ([address] 5/22/2018    Disruption of closure of muscle or muscle flap, sequela 8/26/2020    DVT, lower extremity (HCC)     right leg     Gas gangrene of thigh ([address] 3/20/2020    Hyperlipidemia     Hypertension     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[address]     PVD (peripheral vascular disease) ([address]      Past Surgical History:   Procedure Laterality Date    AMPUTATION ABOVE KNEE Right 4/28/2020    DEBRIDEMENT OF AMPUTATION RIGHT ABOVE KNEE performed by Bari Eisenberg MD at [address] [place] [address] Right 4/30/2020    DEBRIDEMENT OF AMPUTATION RIGHT ABOVE KNEE,  POSS. ABOVE KNEE REVISION, POSS. CLOSURE, POSS.WOUND VAC -- BEN Galindo / Geraldo Roajs TO LOOK IN performed by Bari Eisenberg MD at [place] [address] Right 3/20/2020    RIGHT LOWER EXTREMITY THROMBECTOMY, POSSIBLE ANGIOGRAM, POSSIBLE INTERVENTION, POSSIBLE BYPASS. performed by Bari Eisenberg MD at [place] [address] Right 4/17/2020    RIGHT LEG DEBRIDEMENT INCISION AND DRAINAGE performed by Sergey Mane MD at [address] [place] [address] Right 4/20/2020    RIGHT THIGH WOUND DRESSING CHANGE; DEBRIDEMENT, removal of muscle performed by Sergey Mane MD at [address] [place] [address] Right 4/21/2020    RIGHT THIGH WOUND DRESSING CHANGE POSSIBLE  DEBRIDEMENT - NEEDS DRS.  CASH / JANETTE TO SEE performed by Raysa Rodríguez MD at [place] [address] LEG SURGERY Right 4/23/2020    RIGHT THIGH WOUND DRESSING CHANGE and DEBRIDEMENT performed by Negro Negron MD at [address] Right 10/15/2020    DEBRIDEMENT RIGHT ABOVE KNEE AMPUTATION POSS. REVISION POSS. WOUND VAC performed by Negro Negron MD at [address] Right 12/17/2020    DEBRIDEMENT  RIGHT ABOVE KNEE AMPUTATION WITH POSS. ADVANCED SKIN THERAPY performed by Negro Negron MD at [facility] Right 11/1/2018    AMPUTATION ABOVE KNEE RIGHT LEG performed by Negro Negron MD at [facility] Right 5/24/2018    RIGHT FOOT INCISION AND DRAINAGE WITH PARTIAL BONE RESECTION performed by Anyi Mccullough DPM at [place] [address] OFFICE/OUTPT VISIT,PROCEDURE ONLY Right 8/3/2018    INCISION AND DRAINAGE MULTIPLE AREAS RIGHT FOOT WITH DEBRIDEMENT SOFT TISSUE performed by Anyi Mccullough DPM at [facility] OR    RHINOPLASTY Right     leg      Family History   Problem Relation Age of Onset    Cancer Mother     Diabetes Father     Heart Failure Father     Hypertension Father     Cancer Sister      Social History     Tobacco Use    Smoking status: Current Every Day Smoker     Packs/day: 0.50     Years: 7.00     Pack years: 3.50     Types: Cigarettes    Smokeless tobacco: Never Used   Substance Use Topics    Alcohol use: No     Frequency: Never    Drug use: No     No Known Allergies  Current Outpatient Medications on File Prior to Encounter   Medication Sig Dispense Refill    oxyCODONE (OXYCONTIN) 10 MG extended release tablet Take 1 tablet by mouth every 12 hours as needed for Pain for up to 30 days.  60 each 0    hydrALAZINE (APRESOLINE) 25 MG tablet Take 1 tablet by mouth 3 times daily 90 tablet 3    amLODIPine (NORVASC) 10 MG tablet Take 1 tablet by mouth daily 90 tablet 1    chlorthalidone (HYGROTON) 25 MG tablet Take 1 tablet by mouth daily 30 tablet 3    oxyCODONE (ROXICODONE) 5 MG immediate release tablet Take 1 tablet by mouth every 4-6 hours as needed for Pain for up to 30 days. 180 tablet 0    cloNIDine (CATAPRES) 0.1 MG tablet TAKE ONE TABLET BY MOUTH THREE TIMES DAILY 270 tablet 3    cilostazol (PLETAL) 50 MG tablet Take 1 tablet by mouth 2 times daily 180 tablet 1    ammonium lactate (LAC-HYDRIN) 12 % lotion Apply topically as needed. 1 Bottle 0    mineral oil-hydrophilic petrolatum (AQUAPHOR) ointment Apply topically twice daily dispense 14 ounce jar 396 g 5    mirtazapine (REMERON) 7.5 MG tablet Take 1 tablet by mouth nightly 90 tablet 1    metoprolol tartrate (LOPRESSOR) 25 MG tablet Take 1 tablet by mouth 2 times daily 90 tablet 1    gabapentin (NEURONTIN) 400 MG capsule Take 1 capsule by mouth 3 times daily for 30 days. 270 capsule 1    DULoxetine (CYMBALTA) 60 MG extended release capsule Take 2 capsules by mouth every morning 180 capsule 1    blood glucose test strips (ONETOUCH ULTRA) strip 1 each by Other route 3 times daily As needed. 300 each 1    insulin lispro (HUMALOG) 100 UNIT/ML injection vial Inject 0-12 Units into the skin 3 times daily (with meals) 1 vial 3    Insulin Syringe-Needle U-100 (ULTICARE INSULIN SYRINGE) 31G X 5/16\" 0.3 ML MISC 1 each by Other route 5 times daily 100 each 3    glucose (GLUTOSE) 40 % GEL Take 37.5 mLs by mouth as needed (hypoglycemia) 45 g 1    bisacodyl (DULCOLAX) 5 MG EC tablet Take 1 tablet by mouth daily as needed for Constipation 30 tablet 0    docusate sodium (COLACE) 100 MG capsule Take 1 capsule by mouth 2 times daily (Patient taking differently: Take 100 mg by mouth 2 times daily as needed ) 50 capsule 0    Handicap Placard MISC by Does not apply route Patient cannot walk 200 ft without stopping to rest.    Expiration 10/21/2024 1 each 0     No current facility-administered medications on file prior to encounter.         REVIEW OF SYSTEMS See HPI    Objective:    Pulse 72   Temp 97.9 °F (36.6 °C) (Temporal)   Resp 20   Wt Readings from Last 3 Encounters:   01/06/21 245 lb (111.1 kg)   12/30/20 245 lb (111.1 kg)   12/17/20 245 lb (111.1 kg)     PHYSICAL EXAM  CONSTITUTIONAL:   Awake, alert, cooperative   EYES:  lids and lashes normal   ENT: external ears and nose without lesions   NECK:  supple, symmetrical, trachea midline   SKIN:  Open wound Present    Assessment:     Problem List Items Addressed This Visit     Disruption of closure of muscle or muscle flap, sequela - Primary (Chronic)    Ischemic ulcer of right thigh with fat layer exposed ([address]          Pre Debridement Measurements:  Are located in the [place]  Documentation Flow Sheet  Post Debridement Measurements:  Wound/Ulcer Descriptions are Pre Debridement except measurements:     Wound 12/17/20 Thigh Anterior;Right (Active)   Wound Image   01/06/21 1039   Dressing Status New dressing applied 01/06/21 1141   Wound Cleansed Cleansed with saline 01/06/21 1141   Dressing/Treatment Alginate;ABD 01/06/21 1141   Wound Length (cm) 0.5 cm 01/27/21 1053   Wound Width (cm) 2 cm 01/27/21 1053   Wound Depth (cm) 3.1 cm 01/27/21 1053   Wound Surface Area (cm^2) 1 cm^2 01/27/21 1053   Change in Wound Size % (l*w) 87.23 01/27/21 1053   Wound Volume (cm^3) 3.1 cm^3 01/27/21 1053   Wound Healing % 89 01/27/21 1053   Post-Procedure Length (cm) 2.9 cm 01/13/21 1109   Post-Procedure Width (cm) 2.7 cm 01/13/21 1109   Post-Procedure Depth (cm) 4.5 cm 01/27/21 1114   Post-Procedure Surface Area (cm^2) 7.83 cm^2 01/13/21 1109   Post-Procedure Volume (cm^3) 29.75 cm^3 01/13/21 1109   Wound Assessment Fibrin;Pink/red 01/27/21 1053   Drainage Amount Moderate 01/27/21 1053   Drainage Description Thick; Serosanguinous; Yellow;Green 01/27/21 1053   Odor None 01/27/21 1053   Nadia-wound Assessment Maceration 01/27/21 1053   Number of days: 40     Incision 04/28/20 Thigh Right;Medial (Active)   Number of days: 274   Procedure Note  Indications:  Based on my examination of this patient's wound(s)/ulcer(s) Increase in drainage from your wound  * Drainage with a foul odor  * Bleeding  * Increase in swelling  * Need for compression bandage changes due to slippage, breakthrough drainage.     If you need medical attention outside of the business hours of the [address] [place] [address] please contact your PCP or go to the nearest emergency room.                                                                                                   Electronically signed by Clement Garrett MD on 1/27/2021 at 11:16 AM

## 2021-02-10 ENCOUNTER — HOSPITAL ENCOUNTER (OUTPATIENT)
Dept: WOUND CARE | Age: 66
Discharge: HOME | End: 2021-02-10
Payer: COMMERCIAL

## 2021-02-10 VITALS
SYSTOLIC BLOOD PRESSURE: 142 MMHG | DIASTOLIC BLOOD PRESSURE: 72 MMHG | HEIGHT: 74 IN | HEART RATE: 76 BPM | BODY MASS INDEX: 31.44 KG/M2 | RESPIRATION RATE: 20 BRPM | WEIGHT: 245 LBS | TEMPERATURE: 98 F

## 2021-02-10 DIAGNOSIS — T81.32XS: ICD-10-CM

## 2021-02-10 PROCEDURE — 11042 DBRDMT SUBQ TIS 1ST 20SQCM/<: CPT

## 2021-02-10 PROCEDURE — 11042 DBRDMT SUBQ TIS 1ST 20SQCM/<: CPT | Performed by: SURGERY

## 2021-02-10 RX ORDER — LIDOCAINE HYDROCHLORIDE 40 MG/ML
SOLUTION TOPICAL ONCE
Status: DISCONTINUED | OUTPATIENT
Start: 2021-02-10 | End: 2021-02-11 | Stop reason: HOSPADM

## 2021-02-10 NOTE — PLAN OF CARE
Problem: Wound:  Goal: Will show signs of wound healing; wound closure and no evidence of infection  Description: Will show signs of wound healing; wound closure and no evidence of infection  Outcome: Ongoing     Problem: Arterial:  Goal: Optimize blood flow for wound healing  Description: Optimize blood flow for wound healing  Outcome: Met This Shift     Problem: Smoking cessation:  Goal: Ability to formulate a plan to maintain a tobacco-free life will be supported  Description: Ability to formulate a plan to maintain a tobacco-free life will be supported  Outcome: Ongoing     Problem: Weight control:  Goal: Ability to maintain an optimal weight for height and age will be supported  Description: Ability to maintain an optimal weight for height and age will be supported  Outcome: Met This Shift     Problem: Falls - Risk of:  Goal: Will remain free from falls  Description: Will remain free from falls  Outcome: Met This Shift

## 2021-02-11 ENCOUNTER — TELEPHONE (OUTPATIENT)
Dept: PRIMARY CARE CLINIC | Age: 66
End: 2021-02-11

## 2021-02-11 NOTE — TELEPHONE ENCOUNTER
Pt calling in with lab results for the past 2 weeks:    2/2-164/88  2/3-166/90  2/4-166/84  2/5-180/100  2/6-160/100  2/7-136/68  2/8-152/848  2/9-142/84  2//72  2//80

## 2021-02-15 DIAGNOSIS — G89.4 CHRONIC PAIN SYNDROME: ICD-10-CM

## 2021-02-15 DIAGNOSIS — S78.111A ABOVE KNEE AMPUTATION OF RIGHT LOWER EXTREMITY (HCC): ICD-10-CM

## 2021-02-15 RX ORDER — OXYCODONE HYDROCHLORIDE 5 MG/1
5 TABLET ORAL
Qty: 180 TABLET | Refills: 0 | Status: SHIPPED
Start: 2021-02-15 | End: 2021-03-11 | Stop reason: SDUPTHER

## 2021-02-17 ENCOUNTER — HOSPITAL ENCOUNTER (OUTPATIENT)
Dept: WOUND CARE | Age: 66
Discharge: HOME | End: 2021-02-17
Payer: COMMERCIAL

## 2021-02-18 NOTE — PROGRESS NOTES
Wound Healing Center Followup Visit Note    Referring Physician : Nohelia Norris Str RECORD NUMBER:  [id number]  AGE: 63 y.o. GENDER: male  : 1955  EPISODE DATE:  2/10/2021    Subjective:     Chief Complaint   Patient presents with    Wound Check     right hip      HISTORY of PRESENT ILLNESS HPI   Brigette Fagan is a 63 y.o. male who presents today in regards to follow up evaluation and treatment of wound/ulcer. That patient's past medical, family and social hx were reviewed and changes were made if present. History of Wound Context:  Pt presents in regards to chronic R above knee amputation wound was since 2020. He had developed postoperatively dehiscence of his wound site which had previously undergone a muscle flap. He was having a packed per home health care. The wound had gotten so small that it was difficult to pack. He is still having drainage significant though. At first visit to wound healing center 20 his wound has not been improving. He was not on abx at initial visit to center.     Previous lower extremity procedures  2018  R AKA for chronic R LE wound   3/20/2020 R groin exploration with iliofemoral artery embolectomy, primary closure of arteriotomy, R deep femoral atery embolectomy   2020 Excisional debridement of infected necrotic  tissue, right groin and right thigh   2020 RIGHT THIGH WOUND DRESSING CHANGE and DEBRIDEMENT   2020 Irrigation and excisional debridement of Right Thigh above knee amputation wound   2020 Irrigation and excisional debridement of the Right thigh above knee amputation wound 25 x45 cm  Excision of prosthetic R LE bypass graft  Ligation of right SFA  Posterior hamstring myocutaneous flap 65 cm flap for open wound of 45 x 25 cm per Dr. Palma Cuevas     20  · Wound itself is small - opened up as difficult to pack  · 5 cm depth - culture done, significant amount of fluid expressed once opened up  · aquacell ag packing  · Discussed potential need for further surgical exploration in future - pt would like to avoid hospital and OR if possible  9/2/20  · Wound much improved - depth less  · Start doxycyline for staph culture  9/9/20  · Depth 5 cm again, fluid seems less  9/23/20  · Tunneling now 8 cm, concern that hittig bone  · Discussed with patient continuing current reigmen unlikely to result in healing  · Would benefit from further surgical debridement, likely revision of amputation site and possible wound vac  · Patient would like to consider options and will let me know next week  9/30/20  · Significant tunneling still  · Will proceed with or in 2 weeks per pt request  10/21/20  · OR 10/14/20  · dakins moistened kerlex  · Discussed with Dr. Luis Carlos Correia re pain control issues  · Doxycycline 10 days script given  10/28/20  · Pain better controlled  · + Granulation tissue  · Bone less exposed  11/4/20  · Wound health  · Bone still exposed  11/18/20  · Wound continues to be improved  · Less bone exposed  11/25/20  · Bone exposure still an issue  · Will take for surgery - try dermagraft  · If not successful will likely need revision in future  12/9/20  · Bone exposure, wound overall improvd  · Patient did not follow though with testing for surgery  12/17/20  · dermagraft application  80/03/69  · Wound improved, bone still exposed  1/6/21  · Wound improved, bone still exposed  1/13/21  · Bone almost completely covered  1/27/21  · Bone covered, wound improved  2/10/21  · wound improved,depth less    Wound/Ulcer Pain Timing/Severity: moderate  Quality of pain: aching, throbbing, shooting   Severity:  3 / 10   Modifying Factors: Pain worsens with dressing changes, pressure, debridement  Associated Signs/Symptoms: drainage and pain    Ulcer Identification:  Ulcer Type: arterial, diabetic, pressure and non-healing surgical  Contributing Factors: edema, diabetes, poor glucose control, chronic pressure, decreased performed by Arsh Reyes MD at [address] Right 4/23/2020    RIGHT THIGH WOUND DRESSING CHANGE and DEBRIDEMENT performed by Amberly Ngo MD at [address] Right 10/15/2020    DEBRIDEMENT RIGHT ABOVE KNEE AMPUTATION POSS. REVISION POSS. WOUND VAC performed by Amberly Ngo MD at [address] Right 12/17/2020    DEBRIDEMENT  RIGHT ABOVE KNEE AMPUTATION WITH POSS.  ADVANCED SKIN THERAPY performed by Amberly Ngo MD at [facility] Right 11/1/2018    AMPUTATION ABOVE KNEE RIGHT LEG performed by Amberly Ngo MD at [facility] Right 5/24/2018    RIGHT FOOT INCISION AND DRAINAGE WITH PARTIAL BONE RESECTION performed by Brando Isbell DPM at [facility] OFFICE/OUTPT VISIT,PROCEDURE ONLY Right 8/3/2018    INCISION AND DRAINAGE MULTIPLE AREAS RIGHT FOOT WITH DEBRIDEMENT SOFT TISSUE performed by Brando Isbell DPM at [place] [address] Right     leg      Family History   Problem Relation Age of Onset    Cancer Mother     Diabetes Father     Heart Failure Father     Hypertension Father     Cancer Sister      Social History     Tobacco Use    Smoking status: Current Every Day Smoker     Packs/day: 0.50     Years: 7.00     Pack years: 3.50     Types: Cigarettes    Smokeless tobacco: Never Used   Substance Use Topics    Alcohol use: No     Frequency: Never    Drug use: No     No Known Allergies  Current Outpatient Medications on File Prior to Encounter   Medication Sig Dispense Refill    hydrALAZINE (APRESOLINE) 25 MG tablet Take 1 tablet by mouth 3 times daily 90 tablet 3    amLODIPine (NORVASC) 10 MG tablet Take 1 tablet by mouth daily 90 tablet 1    chlorthalidone (HYGROTON) 25 MG tablet Take 1 tablet by mouth daily 30 tablet 3    cloNIDine (CATAPRES) 0.1 MG tablet TAKE ONE TABLET BY MOUTH THREE TIMES DAILY 270 tablet 3    cilostazol (PLETAL) 50 MG tablet required to promote healing and evaluate the wound base. Performed by: Virginie Mancia MD    Consent obtained:  Yes    Time out taken:  Yes    Pain Control: Anesthetic  Anesthetic: 4% Lidocaine Liquid Topical     Debridement:Excisional Debridement    Using curette the wound(s)/ulcer(s) was/were sharply debrided down through and including the removal of epidermis, dermis, subq,     Devitalized Tissue Debrided:  fibrin, biofilm, slough and exudate to stimulate bleeding to promote healing, post debridement good bleeding base and wound edges noted    Wound/Ulcer #: 1    Percent of Wound/Ulcer Debrided: 100%    Total Surface Area Debrided: 0.15 sq cm     Estimated Blood Loss:  Minimal  Hemostasis Achieved:  by pressure    Procedural Pain:  4  / 10   Post Procedural Pain:  3 / 10      Response to treatment:  Well tolerated    Plan:   Treatment Note please see attached Discharge Instructions    Written patient dismissal instructions given to patient and signed by patient or POA. Discharge Instructions       Visit Discharge/Physician Orders     Discharge condition: Stable     Assessment of pain at discharge:yes     Anesthetic used: 4% lidocaine soln.     Discharge to: Home     Left via:Private automobile     Accompanied by: spouse     ECF/HHA: [facility]     Dressing Orders:RIGHT AKA SITE-Cleanse with normal saline, pack loosely with calcium alginate, dry dressing and secure. Change daily.     Treatment Orders:Eat a diet high in protein and vitamin C. Take a multiple vitamin daily unless contraindicated.      [facility] followup visit: 1 week _____________________________  (Please note your next appointment above and if you are unable to keep, kindly give a 24 hour notice.  Thank you.)     Physician signature:__________________________  Dayna Barroso  If you experience any of the following, please call the [facility] [address] during business hours:     * Increase in Pain  * Temperature over 101  * Increase in drainage from your wound  * Drainage with a foul odor  * Bleeding  * Increase in swelling  * Need for compression bandage changes due to slippage, breakthrough drainage.     If you need medical attention outside of the business hours of the [address] [place] [address] Road please contact your PCP or go to the nearest emergency room.                                                                                                                        Electronically signed by Virginie Mancia MD

## 2021-02-22 ENCOUNTER — IMMUNIZATION (OUTPATIENT)
Dept: PRIMARY CARE CLINIC | Age: 66
End: 2021-02-22
Payer: COMMERCIAL

## 2021-02-22 ENCOUNTER — OFFICE VISIT (OUTPATIENT)
Dept: PRIMARY CARE CLINIC | Age: 66
End: 2021-02-22
Payer: COMMERCIAL

## 2021-02-22 VITALS
BODY MASS INDEX: 31.44 KG/M2 | HEIGHT: 74 IN | RESPIRATION RATE: 18 BRPM | HEART RATE: 85 BPM | DIASTOLIC BLOOD PRESSURE: 78 MMHG | TEMPERATURE: 98.6 F | SYSTOLIC BLOOD PRESSURE: 138 MMHG | WEIGHT: 245 LBS | OXYGEN SATURATION: 96 %

## 2021-02-22 DIAGNOSIS — I10 HYPERTENSION, UNSPECIFIED TYPE: ICD-10-CM

## 2021-02-22 DIAGNOSIS — E11.69 CONTROLLED TYPE 2 DIABETES MELLITUS WITH OTHER SPECIFIED COMPLICATION, WITH LONG-TERM CURRENT USE OF INSULIN (HCC): ICD-10-CM

## 2021-02-22 DIAGNOSIS — D68.9 COAGULOPATHY (HCC): ICD-10-CM

## 2021-02-22 DIAGNOSIS — I70.634: Primary | ICD-10-CM

## 2021-02-22 DIAGNOSIS — N18.30 STAGE 3 CHRONIC KIDNEY DISEASE, UNSPECIFIED WHETHER STAGE 3A OR 3B CKD (HCC): ICD-10-CM

## 2021-02-22 DIAGNOSIS — E44.0 MODERATE PROTEIN-CALORIE MALNUTRITION (HCC): Chronic | ICD-10-CM

## 2021-02-22 DIAGNOSIS — Z79.4 CONTROLLED TYPE 2 DIABETES MELLITUS WITH OTHER SPECIFIED COMPLICATION, WITH LONG-TERM CURRENT USE OF INSULIN (HCC): ICD-10-CM

## 2021-02-22 DIAGNOSIS — G89.4 CHRONIC PAIN SYNDROME: ICD-10-CM

## 2021-02-22 DIAGNOSIS — Z89.611 S/P AKA (ABOVE KNEE AMPUTATION), RIGHT (HCC): ICD-10-CM

## 2021-02-22 DIAGNOSIS — S78.111A ABOVE KNEE AMPUTATION OF RIGHT LOWER EXTREMITY (HCC): ICD-10-CM

## 2021-02-22 DIAGNOSIS — I73.9 PVD (PERIPHERAL VASCULAR DISEASE) (HCC): ICD-10-CM

## 2021-02-22 PROBLEM — Z01.21 ENCOUNTER FOR DENTAL EXAMINATION AND CLEANING WITH ABNORMAL FINDINGS: Status: RESOLVED | Noted: 2018-04-02 | Resolved: 2021-02-22

## 2021-02-22 PROBLEM — E11.65 HYPERGLYCEMIA DUE TO TYPE 2 DIABETES MELLITUS (HCC): Status: RESOLVED | Noted: 2020-03-20 | Resolved: 2021-02-22

## 2021-02-22 PROBLEM — N17.9 ACUTE KIDNEY INJURY (HCC): Status: RESOLVED | Noted: 2020-05-18 | Resolved: 2021-02-22

## 2021-02-22 PROBLEM — Z86.718 HISTORY OF DVT (DEEP VEIN THROMBOSIS): Status: RESOLVED | Noted: 2018-07-31 | Resolved: 2021-02-22

## 2021-02-22 PROBLEM — N49.2 CELLULITIS, SCROTUM: Status: RESOLVED | Noted: 2020-03-20 | Resolved: 2021-02-22

## 2021-02-22 PROBLEM — M86.9 OSTEOMYELITIS (HCC): Status: RESOLVED | Noted: 2018-10-24 | Resolved: 2021-02-22

## 2021-02-22 PROBLEM — I82.549: Status: RESOLVED | Noted: 2020-05-04 | Resolved: 2021-02-22

## 2021-02-22 PROBLEM — A48.0: Status: RESOLVED | Noted: 2020-03-20 | Resolved: 2021-02-22

## 2021-02-22 PROCEDURE — G8427 DOCREV CUR MEDS BY ELIG CLIN: HCPCS | Performed by: FAMILY MEDICINE

## 2021-02-22 PROCEDURE — G8482 FLU IMMUNIZE ORDER/ADMIN: HCPCS | Performed by: FAMILY MEDICINE

## 2021-02-22 PROCEDURE — 2022F DILAT RTA XM EVC RTNOPTHY: CPT | Performed by: FAMILY MEDICINE

## 2021-02-22 PROCEDURE — 99213 OFFICE O/P EST LOW 20 MIN: CPT | Performed by: FAMILY MEDICINE

## 2021-02-22 PROCEDURE — 91300 COVID-19, PFIZER VACCINE 30MCG/0.3ML DOSE: CPT | Performed by: CLINICAL NURSE SPECIALIST

## 2021-02-22 PROCEDURE — 0001A COVID-19, PFIZER VACCINE 30MCG/0.3ML DOSE: CPT | Performed by: CLINICAL NURSE SPECIALIST

## 2021-02-22 PROCEDURE — 3046F HEMOGLOBIN A1C LEVEL >9.0%: CPT | Performed by: FAMILY MEDICINE

## 2021-02-22 PROCEDURE — 4004F PT TOBACCO SCREEN RCVD TLK: CPT | Performed by: FAMILY MEDICINE

## 2021-02-22 PROCEDURE — 3017F COLORECTAL CA SCREEN DOC REV: CPT | Performed by: FAMILY MEDICINE

## 2021-02-22 PROCEDURE — G8417 CALC BMI ABV UP PARAM F/U: HCPCS | Performed by: FAMILY MEDICINE

## 2021-02-22 RX ORDER — OXYCODONE HCL 10 MG/1
10 TABLET, FILM COATED, EXTENDED RELEASE ORAL EVERY 12 HOURS PRN
Qty: 60 EACH | Refills: 0 | Status: SHIPPED
Start: 2021-02-22 | End: 2021-03-23 | Stop reason: SDUPTHER

## 2021-02-22 RX ORDER — GABAPENTIN 800 MG/1
800 TABLET ORAL 4 TIMES DAILY
Qty: 120 TABLET | Refills: 5 | Status: SHIPPED
Start: 2021-02-22 | End: 2021-09-28 | Stop reason: SDUPTHER

## 2021-02-22 ASSESSMENT — ENCOUNTER SYMPTOMS
APNEA: 1
BLOOD IN STOOL: 0
SHORTNESS OF BREATH: 0
PHOTOPHOBIA: 0
CONSTIPATION: 1
BACK PAIN: 0
DIARRHEA: 0

## 2021-02-22 ASSESSMENT — PATIENT HEALTH QUESTIONNAIRE - PHQ9
2. FEELING DOWN, DEPRESSED OR HOPELESS: 0
SUM OF ALL RESPONSES TO PHQ QUESTIONS 1-9: 0
SUM OF ALL RESPONSES TO PHQ QUESTIONS 1-9: 0
1. LITTLE INTEREST OR PLEASURE IN DOING THINGS: 0

## 2021-02-22 NOTE — PROGRESS NOTES
months or sooner if needed. Patient will continue to go weekly to the wound care clinic. We were able to get him upstairs for the first Covid vaccination. Of note there was no wheelchair in the entire building that was not being used so it took slightly longer to get upstairs for vaccine and blood work that it would have normally. No follow-ups on file. SUBJECTIVE/OBJECTIVE:  HPI  Patient resents today for 2-month follow-up. Patient states that he has been having many issues over the last 2 months. Good news is that he has been told that his previous right-sided AKA is healing well and that the mesh is currently holding together without issue. Patient states that his main concern today is fluctuating blood pressure and blood sugars. Patient was called in with previous readings. Blood pressure today is within normal limits despite having to use a walker instead of a wheelchair to get back to the room. Patient had mild fatigue but no chest pain or shortness of breath with exertion. Patient relates worsening pain and neuropathic symptoms over the last several weeks. This may be related to worsening blood sugar control. Patient also relates intermittent pruritus to the back region. He does have home health aides examine the areas but has not noticed any excoriation or drainage. No other current issues at this time. Review of Systems   Constitutional: Positive for fatigue. HENT: Negative for hearing loss and nosebleeds. Eyes: Negative for photophobia. Respiratory: Positive for apnea. Negative for shortness of breath. Cardiovascular: Negative for palpitations and leg swelling. Gastrointestinal: Positive for constipation. Negative for blood in stool and diarrhea. Endocrine: Negative for polydipsia. Genitourinary: Negative for dysuria, frequency, hematuria and urgency. Musculoskeletal: Positive for arthralgias, gait problem, joint swelling and myalgias. Negative for back pain. Right Sided above-the-knee amputation revised multiple times until it is  just distal to the hip joint   Skin: Negative. Neurological: Positive for numbness. Negative for dizziness and tremors. Hematological: Does not bruise/bleed easily. Psychiatric/Behavioral: Positive for decreased concentration, dysphoric mood and sleep disturbance. Negative for hallucinations, self-injury and suicidal ideas. The patient is nervous/anxious. All other systems reviewed and are negative. Physical Exam  Vitals signs reviewed. HENT:      Head: Normocephalic and atraumatic. Mouth/Throat:      Dentition: Abnormal dentition (edentulous upper). Eyes:      General: No scleral icterus. Conjunctiva/sclera: Conjunctivae normal.      Pupils: Pupils are equal, round, and reactive to light. Neck:      Musculoskeletal: Neck supple. Thyroid: No thyromegaly. Cardiovascular:      Rate and Rhythm: Normal rate and regular rhythm. Heart sounds: Normal heart sounds. No murmur. Pulmonary:      Effort: Pulmonary effort is normal.      Breath sounds: Normal breath sounds. No rales. Abdominal:      General: Bowel sounds are decreased. There is no distension. Palpations: Abdomen is soft. Tenderness: There is no abdominal tenderness. Musculoskeletal: Normal range of motion. Legs:    Lymphadenopathy:      Cervical: No cervical adenopathy. Skin:     General: Skin is warm and dry. Findings: No erythema or rash. Neurological:      Mental Status: He is alert and oriented to person, place, and time. Cranial Nerves: No cranial nerve deficit. Psychiatric:         Judgment: Judgment normal.                 An electronic signature was used to authenticate this note.     --Juan Alberto Kline DO

## 2021-02-24 ENCOUNTER — HOSPITAL ENCOUNTER (OUTPATIENT)
Dept: WOUND CARE | Age: 66
Discharge: HOME | End: 2021-02-24
Payer: COMMERCIAL

## 2021-02-24 VITALS
TEMPERATURE: 97.7 F | BODY MASS INDEX: 31.44 KG/M2 | RESPIRATION RATE: 20 BRPM | HEART RATE: 76 BPM | SYSTOLIC BLOOD PRESSURE: 124 MMHG | WEIGHT: 245 LBS | HEIGHT: 74 IN | DIASTOLIC BLOOD PRESSURE: 78 MMHG

## 2021-02-24 DIAGNOSIS — L97.112: Primary | ICD-10-CM

## 2021-02-24 DIAGNOSIS — T81.32XS: ICD-10-CM

## 2021-02-24 PROCEDURE — 11042 DBRDMT SUBQ TIS 1ST 20SQCM/<: CPT | Performed by: SURGERY

## 2021-02-24 PROCEDURE — 11042 DBRDMT SUBQ TIS 1ST 20SQCM/<: CPT

## 2021-02-24 RX ORDER — LIDOCAINE HYDROCHLORIDE 40 MG/ML
SOLUTION TOPICAL ONCE
Status: DISCONTINUED | OUTPATIENT
Start: 2021-02-24 | End: 2021-02-25 | Stop reason: HOSPADM

## 2021-02-24 NOTE — PROGRESS NOTES
Wound Healing Center Followup Visit Note    Referring Physician : Nohelia Sifuentes [address] RECORD NUMBER:  [id number]  AGE: 63 y.o. GENDER: male  : 1955  EPISODE DATE:  2021    Subjective:     Chief Complaint   Patient presents with    Wound Check     right amp site      HISTORY of PRESENT ILLNESS HPI   Kishor Pierce is a 63 y.o. male who presents today in regards to follow up evaluation and treatment of wound/ulcer. That patient's past medical, family and social hx were reviewed and changes were made if present. History of Wound Context:  Pt presents in regards to chronic R above knee amputation wound was since 2020. He had developed postoperatively dehiscence of his wound site which had previously undergone a muscle flap. He was having a packed per home health care. The wound had gotten so small that it was difficult to pack. He is still having drainage significant though. At first visit to wound healing center 20 his wound has not been improving. He was not on abx at initial visit to center.     Previous lower extremity procedures  2018  R AKA for chronic R LE wound   3/20/2020 R groin exploration with iliofemoral artery embolectomy, primary closure of arteriotomy, R deep femoral atery embolectomy   2020 Excisional debridement of infected necrotic  tissue, right groin and right thigh   2020 RIGHT THIGH WOUND DRESSING CHANGE and DEBRIDEMENT   2020 Irrigation and excisional debridement of Right Thigh above knee amputation wound   2020 Irrigation and excisional debridement of the Right thigh above knee amputation wound 25 x45 cm  Excision of prosthetic R LE bypass graft  Ligation of right SFA  Posterior hamstring myocutaneous flap 65 cm flap for open wound of 45 x 25 cm per Dr. Barbara Varma     20  · Wound itself is small - opened up as difficult to pack  · 5 cm depth - culture done, significant amount of fluid expressed once opened up  · aquacell ag packing  · Discussed potential need for further surgical exploration in future - pt would like to avoid hospital and OR if possible  9/2/20  · Wound much improved - depth less  · Start doxycyline for staph culture  9/9/20  · Depth 5 cm again, fluid seems less  9/23/20  · Tunneling now 8 cm, concern that hittig bone  · Discussed with patient continuing current reigmen unlikely to result in healing  · Would benefit from further surgical debridement, likely revision of amputation site and possible wound vac  · Patient would like to consider options and will let me know next week  9/30/20  · Significant tunneling still  · Will proceed with or in 2 weeks per pt request  10/21/20  · OR 10/14/20  · dakins moistened kerlex  · Discussed with Dr. Vickie Pryor re pain control issues  · Doxycycline 10 days script given  10/28/20  · Pain better controlled  · + Granulation tissue  · Bone less exposed  11/4/20  · Wound health  · Bone still exposed  11/18/20  · Wound continues to be improved  · Less bone exposed  11/25/20  · Bone exposure still an issue  · Will take for surgery - try dermagraft  · If not successful will likely need revision in future  12/9/20  · Bone exposure, wound overall improvd  · Patient did not follow though with testing for surgery  12/17/20  · dermagraft application  16/11/31  · Wound improved, bone still exposed  1/6/21  · Wound improved, bone still exposed  1/13/21  · Bone almost completely covered  1/27/21  · Bone covered, wound improved  2/10/21  · wound improved,depth less  2/24/21  · Wound stable - 3.5 cm depth    Wound/Ulcer Pain Timing/Severity: moderate  Quality of pain: aching, throbbing, shooting   Severity:  3 / 10   Modifying Factors: Pain worsens with dressing changes, pressure, debridement  Associated Signs/Symptoms: drainage and pain    Ulcer Identification:  Ulcer Type: arterial, diabetic, pressure and non-healing surgical  Contributing Factors: edema, diabetes, poor glucose INCISION AND DRAINAGE performed by Bar Dong MD at [address] Right 4/20/2020    RIGHT THIGH WOUND DRESSING CHANGE; DEBRIDEMENT, removal of muscle performed by Bar Dnog MD at [address] Right 4/21/2020    RIGHT THIGH WOUND DRESSING CHANGE POSSIBLE  DEBRIDEMENT - NEEDS BEN Duenas / JANETTE TO SEE performed by Stacie Mauricio MD at [address] Right 4/23/2020    RIGHT THIGH WOUND DRESSING CHANGE and DEBRIDEMENT performed by Karlie Taylor MD at [address] Right 10/15/2020    DEBRIDEMENT RIGHT ABOVE KNEE AMPUTATION POSS. REVISION POSS. WOUND VAC performed by Karlie Taylor MD at [address] Right 12/17/2020    DEBRIDEMENT  RIGHT ABOVE KNEE AMPUTATION WITH POSS.  ADVANCED SKIN THERAPY performed by Karlie Taylor MD at [facility] Right 11/1/2018    AMPUTATION ABOVE KNEE RIGHT LEG performed by Karlie Taylor MD at [facility] Right 5/24/2018    RIGHT FOOT INCISION AND DRAINAGE WITH PARTIAL BONE RESECTION performed by Sonia Read DPM at [address] [place] [address] OFFICE/OUTPT VISIT,PROCEDURE ONLY Right 8/3/2018    INCISION AND DRAINAGE MULTIPLE AREAS RIGHT FOOT WITH DEBRIDEMENT SOFT TISSUE performed by Sonia Read DPM at [facility] OR    RHINOPLASTY Right     leg      Family History   Problem Relation Age of Onset    Cancer Mother     Diabetes Father     Heart Failure Father     Hypertension Father     Cancer Sister      Social History     Tobacco Use    Smoking status: Current Every Day Smoker     Packs/day: 0.50     Years: 7.00     Pack years: 3.50     Types: Cigarettes    Smokeless tobacco: Never Used   Substance Use Topics    Alcohol use: No     Frequency: Never    Drug use: No     No Known Allergies  Current Outpatient Medications on File Prior to Encounter   Medication Sig Dispense Refill    oxyCODONE (OXYCONTIN) 10 MG extended release tablet Take 1 tablet by mouth every 12 hours as needed for Pain for up to 30 days. 60 each 0    gabapentin (NEURONTIN) 800 MG tablet Take 1 tablet by mouth 4 times daily for 30 days. 120 tablet 5    oxyCODONE (ROXICODONE) 5 MG immediate release tablet Take 1 tablet by mouth every 4-6 hours as needed for Pain for up to 30 days. 180 tablet 0    hydrALAZINE (APRESOLINE) 25 MG tablet Take 1 tablet by mouth 3 times daily 90 tablet 3    amLODIPine (NORVASC) 10 MG tablet Take 1 tablet by mouth daily 90 tablet 1    chlorthalidone (HYGROTON) 25 MG tablet Take 1 tablet by mouth daily 30 tablet 3    cloNIDine (CATAPRES) 0.1 MG tablet TAKE ONE TABLET BY MOUTH THREE TIMES DAILY 270 tablet 3    cilostazol (PLETAL) 50 MG tablet Take 1 tablet by mouth 2 times daily 180 tablet 1    ammonium lactate (LAC-HYDRIN) 12 % lotion Apply topically as needed. 1 Bottle 0    mineral oil-hydrophilic petrolatum (AQUAPHOR) ointment Apply topically twice daily dispense 14 ounce jar 396 g 5    mirtazapine (REMERON) 7.5 MG tablet Take 1 tablet by mouth nightly 90 tablet 1    metoprolol tartrate (LOPRESSOR) 25 MG tablet Take 1 tablet by mouth 2 times daily 90 tablet 1    DULoxetine (CYMBALTA) 60 MG extended release capsule Take 2 capsules by mouth every morning 180 capsule 1    insulin lispro (HUMALOG) 100 UNIT/ML injection vial Inject 0-12 Units into the skin 3 times daily (with meals) 1 vial 3    bisacodyl (DULCOLAX) 5 MG EC tablet Take 1 tablet by mouth daily as needed for Constipation 30 tablet 0    docusate sodium (COLACE) 100 MG capsule Take 1 capsule by mouth 2 times daily (Patient taking differently: Take 100 mg by mouth 2 times daily as needed ) 50 capsule 0    blood glucose test strips (ONETOUCH ULTRA) strip 1 each by Other route 3 times daily As needed.  300 each 1    Insulin Syringe-Needle U-100 (ULTICARE INSULIN SYRINGE) 31G X 5/16\" 0.3 ML MISC 1 each by Other route 5 times daily 100 each 3    glucose (GLUTOSE) 40 % GEL Take 37.5 mLs by mouth as needed (hypoglycemia) 45 g 1    Handicap Placard MISC by Does not apply route Patient cannot walk 200 ft without stopping to rest.    Expiration 10/21/2024 1 each 0     No current facility-administered medications on file prior to encounter. REVIEW OF SYSTEMS See HPI    Objective:    /78   Pulse 76   Temp 97.7 °F (36.5 °C) (Temporal)   Resp 20   Ht 6' 2\" (1.88 m)   Wt 245 lb (111.1 kg)   BMI 31.46 kg/m²   Wt Readings from Last 3 Encounters:   02/24/21 245 lb (111.1 kg)   02/22/21 245 lb (111.1 kg)   02/10/21 245 lb (111.1 kg)     PHYSICAL EXAM  CONSTITUTIONAL:   Awake, alert, cooperative   EYES:  lids and lashes normal   ENT: external ears and nose without lesions   NECK:  supple, symmetrical, trachea midline   SKIN:  Open wound Present    Assessment:     Problem List Items Addressed This Visit     Disruption of closure of muscle or muscle flap, sequela (Chronic)    Ischemic ulcer of right thigh with fat layer exposed ([facility] [address] - Primary          Pre Debridement Measurements:  Are located in the [place]  Documentation Flow Sheet  Post Debridement Measurements:  Wound/Ulcer Descriptions are Pre Debridement except measurements:     Wound 12/17/20 Thigh Right;Lateral #1 (Active)   Wound Image   02/10/21 1057   Dressing Status New dressing applied;Clean;Dry; Intact 02/10/21 1138   Wound Cleansed Cleansed with saline 02/10/21 1138   Dressing/Treatment ABD; Alginate;Dry dressing 02/10/21 1138   Offloading for Diabetic Foot Ulcers Other (comment) 02/10/21 1138   Wound Length (cm) 0.3 cm 02/24/21 1103   Wound Width (cm) 0.2 cm 02/24/21 1103   Wound Depth (cm) 3.4 cm 02/24/21 1103   Wound Surface Area (cm^2) 0.06 cm^2 02/24/21 1103   Change in Wound Size % (l*w) 99.23 02/24/21 1103   Wound Volume (cm^3) 0.2 cm^3 02/24/21 1103   Wound Healing % 99 02/24/21 1103   Post-Procedure Length (cm) 0.4 cm 02/24/21 1119   Post-Procedure Width (cm) 0.3 cm 02/24/21 1119   Post-Procedure Depth (cm) 3.5 cm 02/24/21 1119   Post-Procedure Surface Area (cm^2) 0.12 cm^2 02/24/21 1119   Post-Procedure Volume (cm^3) 0.42 cm^3 02/24/21 1119   Wound Assessment Pink/red 02/24/21 1103   Drainage Amount Moderate 02/24/21 1103   Drainage Description Serosanguinous;Brown 02/24/21 1103   Odor None 02/24/21 1103   Nadia-wound Assessment Maceration 02/24/21 1103   Number of days: 68     Incision 04/28/20 Thigh Right;Medial (Active)   Number of days: 302   Procedure Note  Indications:  Based on my examination of this patient's wound(s)/ulcer(s) today, debridement is required to promote healing and evaluate the wound base. Performed by: Brent Gomez MD    Consent obtained:  Yes    Time out taken:  Yes    Pain Control: Anesthetic  Anesthetic: 4% Lidocaine Liquid Topical     Debridement:Excisional Debridement    Using curette the wound(s)/ulcer(s) was/were sharply debrided down through and including the removal of epidermis, dermis, subq,     Devitalized Tissue Debrided:  fibrin, biofilm, slough and exudate to stimulate bleeding to promote healing, post debridement good bleeding base and wound edges noted    Wound/Ulcer #: 1    Percent of Wound/Ulcer Debrided: 100%    Total Surface Area Debrided: 0.06 sq cm     Estimated Blood Loss:  Minimal  Hemostasis Achieved:  by pressure    Procedural Pain:  4  / 10   Post Procedural Pain:  3 / 10      Response to treatment:  Well tolerated    Plan:   Treatment Note please see attached Discharge Instructions    Written patient dismissal instructions given to patient and signed by patient or POA.          Discharge Instructions       Visit Discharge/Physician Orders     Discharge condition: Stable     Assessment of pain at discharge:yes     Anesthetic used: 4% lidocaine soln.     Discharge to: Home     Left via:Private automobile     Accompanied by: spouse     ECF/HHA: [facility]     Dressing Orders:RIGHT AKA SITE-Cleanse with normal saline, pack loosely with calcium alginate, dry dressing and secure. Change daily.     Treatment Orders:Eat a diet high in protein and vitamin C. Take a multiple vitamin daily unless contraindicated.      [facility] followup visit: 1 week _____________________________  (Please note your next appointment above and if you are unable to keep, kindly give a 24 hour notice.  Thank you.)     Physician signature:__________________________  Surinamese Pen  If you experience any of the following, please call the [facility] during business hours:     * Increase in Pain  * Temperature over 101  * Increase in drainage from your wound  * Drainage with a foul odor  * Bleeding  * Increase in swelling  * Need for compression bandage changes due to slippage, breakthrough drainage.     If you need medical attention outside of the business hours of the [facility] [employer] [facility] please contact your PCP or go to the nearest emergency room.                                                                  Electronically signed by Mari Avitia MD

## 2021-02-24 NOTE — PLAN OF CARE
Problem: Wound:  Goal: Will show signs of wound healing; wound closure and no evidence of infection  Description: Will show signs of wound healing; wound closure and no evidence of infection  Outcome: Ongoing     Problem: Arterial:  Goal: Optimize blood flow for wound healing  Description: Optimize blood flow for wound healing  Outcome: Met This Shift     Problem: Smoking cessation:  Goal: Ability to formulate a plan to maintain a tobacco-free life will be supported  Description: Ability to formulate a plan to maintain a tobacco-free life will be supported  Outcome: Ongoing     Problem: Weight control:  Goal: Ability to maintain an optimal weight for height and age will be supported  Description: Ability to maintain an optimal weight for height and age will be supported  Outcome: Met This Shift     Problem: Blood Glucose:  Goal: Ability to maintain appropriate glucose levels will improve  Description: Ability to maintain appropriate glucose levels will improve  Outcome: Met This Shift

## 2021-02-24 NOTE — DISCHARGE INSTR - COC
Continuity of Care Form    Patient Name: Kvng Lind   :  1955  MRN:  [id number]    Admit date:  2021  Discharge date:  ***    Code Status Order: Prior   Advance Directives:   [facility] Documentation     Date/Time Healthcare Directive Type of Healthcare Directive Copy in [facility] [address] Agent's Name Healthcare Agent's Phone Number    [phone]  No, patient does not have an advance directive for healthcare treatment -- -- -- -- --          Admitting Physician:  No admitting provider for patient encounter. PCP: Yessi Puga DO    Discharging Nurse: [facility] Unit/Room#: No information available for this encounter. Discharging Unit Phone Number: ***    Emergency Contact:   Extended Emergency Contact Information  Primary Emergency Contact: [facility] Phone: [phone]  Mobile Phone: [phone]  Relation: Other  Secondary Emergency Contact: [facility] Phone: [phone]  Relation: Brother/Sister    Past Surgical History:  Past Surgical History:   Procedure Laterality Date    AMPUTATION ABOVE KNEE Right 2020    DEBRIDEMENT OF AMPUTATION RIGHT ABOVE KNEE performed by Bari Eisenberg MD at [address] [facility] Right 2020    DEBRIDEMENT OF AMPUTATION RIGHT ABOVE KNEE,  POSS. ABOVE KNEE REVISION, POSS. CLOSURE, POSS.WOUND VAC -- BEN Galindo / Geraldo Rojas TO LOOK IN performed by Bari Eisenberg MD at [place] [address] Right 3/20/2020    RIGHT LOWER EXTREMITY THROMBECTOMY, POSSIBLE ANGIOGRAM, POSSIBLE INTERVENTION, POSSIBLE BYPASS.  per[facility] [address]  [facility] Right 2020    RIGHT LEG DEBRIDEMENT INCISION AND DRAINAGE performed by Sergey Mane MD at [address] [facility] Right 2020  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) I70.65    Coagulopathy (HCC) D68.9    Moderate protein-calorie malnutrition (HCC) E44.0    PVD (peripheral vascular disease) (HCC) I73.9    Leukocytosis D72.829    Stage 3 chronic kidney disease N18.30    Tobacco dependence F17.200    HLD (hyperlipidemia) E78.5    S/P AKA (above knee amputation), right ([address] Z89.611    History of vascular surgery Z98.890    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 8XXA, L08.9    Postoperative wound infection T81.49XA    Ischemic ulcer of right thigh with fat layer exposed ([address] L97.112    Ischemic ulcer of right thigh with necrosis of muscle ([address] L97.113    Above knee amputation of right lower extremity ([facility]) H08.005A    Postoperative pain G89.18    Disruption of closure of muscle or muscle flap, sequela T81.32XS    Chronic pain syndrome G89.4       Isolation/Infection:   Isolation          No Isolation        Patient Infection Status     Infection Onset Added Last Indicated Last Indicated By Review Planned Expiration Resolved Resolved By    [facility] 12/17/20 12/20/20 12/17/20 Culture, Surgical        Surgical culture 12/17/20    Resolved    COVID-19 Rule Out 12/10/20 12/10/20 12/10/20 Covid-19 Ambulatory (Ordered)   12/11/20 Rule-Out Test Resulted    MRSA 10/15/20 10/17/20 10/15/20 Culture, Surgical   11/30/20 Keri Otero RN    Wound-right knee- is contained 11/30/20 per PAT-Ines  Wound-Tissue 10/15/20    MRSA 08/26/20 08/28/20 08/26/20 Culture, Wound   10/13/20 Drucella Lundborg, RN    Right AKA amp site 8/26/20    MRSA 06/14/20 06/16/20 06/14/20 Culture, Wound   06/22/20 Keri Otero RN    Discontinue isolation per ID 6/19/20  Wound-thigh 6/14/20    COVID-19 Rule Out 06/03/20 06/04/20 06/03/20 Covid-19 Ambulatory (Ordered)   06/05/20 Rule-Out Test Resulted C-diff Rule Out 05/18/20 05/18/20 05/18/20 Clostridium difficile EIA (Ordered)   05/18/20 Rule-Out Test Resulted    ESBL (Extended Spectrum Beta Lactamase)  04/22/20 04/22/20 Remedios Walton RN   04/22/20 Remedios Walton RN    Klebsiella oxytoca wound-right leg 4/17/20          Nurse Assessment:  Last Vital Signs: /78   Pulse 76   Temp 97.7 °F (36.5 °C) (Temporal)   Resp 20   Ht 6' 2\" (1.88 m)   Wt 245 lb (111.1 kg)   BMI 31.46 kg/m²     Last documented pain score (0-10 scale):    Last Weight:   Wt Readings from Last 1 Encounters:   02/24/21 245 lb (111.1 kg)     Mental Status:  {IP PT MENTAL STATUS:20030}    IV Access:  { RONY IV ACCESS:790660454}    Nursing Mobility/ADLs:  Walking   {[facility] DME ETMD:352751811}  Transfer  {[facility] DME VMHU:673934092}  Bathing  {[facility] DME IVXT:844016119}  Dressing  {[facility] DME IBRY:628501413}  Toileting  {[facility] DME YRCJ:028059211}  Feeding  {[facility] DME JKTL:291157882}  Med Admin  {[facility] DME NBEP:618842903}  Med Delivery   { RONY MED Delivery:099302033}    Wound Care Documentation and Therapy:  Wound 12/17/20 Thigh Right;Lateral #1 (Active)   Wound Image   02/10/21 1057   Dressing Status New dressing applied;Clean;Dry; Intact 02/10/21 1138   Wound Cleansed Cleansed with saline 02/10/21 1138   Dressing/Treatment ABD; Alginate;Dry dressing 02/10/21 1138   Offloading for Diabetic Foot Ulcers Other (comment) 02/10/21 1138   Wound Length (cm) 0.3 cm 02/24/21 1103   Wound Width (cm) 0.2 cm 02/24/21 1103   Wound Depth (cm) 3.4 cm 02/24/21 1103   Wound Surface Area (cm^2) 0.06 cm^2 02/24/21 1103   Change in Wound Size % (l*w) 99.23 02/24/21 1103   Wound Volume (cm^3) 0.2 cm^3 02/24/21 1103   Wound Healing % 99 02/24/21 1103   Post-Procedure Length (cm) 0.4 cm 02/10/21 1127   Post-Procedure Width (cm) 0.5 cm 02/10/21 1127   Post-Procedure Depth (cm) 3.5 cm 02/10/21 1127   Post-Procedure Surface Area (cm^2) 0.2 cm^2 02/10/21 1127   Post-Procedure Volume (cm^3) 0.7 cm^3 02/10/21 1127 Wound Assessment Pink/red 21 1103   Drainage Amount Moderate 21 1103   Drainage Description Serosanguinous;Brown 21 1103   Odor None 21 1103   Nadia-wound Assessment Maceration 21 1103   Number of days: 68        Elimination:  Continence:   · Bowel: {YES / DD:03507}  · Bladder: {YES / W}  Urinary Catheter: {Urinary Catheter:775192428}   Colostomy/Ileostomy/Ileal Conduit: {YES / AP:36334}       Date of Last BM: ***  No intake or output data in the 24 hours ending 21 1104  No intake/output data recorded.     Safety Concerns:     [address] [employer] Safety Concerns:303092352}    Impairments/Disabilities:      [employer] Impairments/Disabilities:863969019}    Nutrition Therapy:  Current Nutrition Therapy:   [employer] Diet List:896061953}    Routes of Feeding: {CHP DME Other Feedings:496803754}  Liquids: {Slp liquid thickness:02138}  Daily Fluid Restriction: {CHP DME Yes amt example:643714474}  Last Modified Barium Swallow with Video (Video Swallowing Test): {Done Not Done SUYR:386495240}    Treatments at the Time of Hospital Discharge:   Respiratory Treatments: ***  Oxygen Therapy:  {Therapy; copd oxygen:88635}  Ventilator:    { CC Vent YVYI:889038548}    Rehab Therapies: {THERAPEUTIC INTERVENTION:6437149231}  Weight Bearing Status/Restrictions: [address] Walter  Weight Bearin}  Other Medical Equipment (for information only, NOT a DME order):  {EQUIPMENT:065787568}  Other Treatments: ***    Patient's personal belongings (please select all that are sent with patient):  {CHP DME Belongings:343661530}    RN SIGNATURE:  {Esignature:073232477}    CASE MANAGEMENT/SOCIAL WORK SECTION    Inpatient Status Date: ***    Readmission Risk Assessment Score:  Readmission Risk              Risk of Unplanned Readmission:        0           Discharging to Facility/ Agency   · Name:   · Address:  · Phone:  · Fax:    Dialysis Facility (if applicable)   · Name:  · Address:  · Dialysis Schedule:  · Phone:  · Fax: / signature: {Esignature:378758188}    PHYSICIAN SECTION    Prognosis: {Prognosis:6391845177}    Condition at Discharge: [address] Watson Patient Condition:776974367}    Rehab Potential (if transferring to Rehab): {Prognosis:5112998074}    Recommended Labs or Other Treatments After Discharge: ***    Physician Certification: I certify the above information and transfer of Denilson Manjarrez  is necessary for the continuing treatment of the diagnosis listed and that he requires {Admit to Appropriate Level of Care:92112} for {GREATER/LESS:552135279} 30 days.      Update Admission H&P: {CHP DME Changes in VUZAA:088393881}    PHYSICIAN SIGNATURE:  {Esignature:083859444}

## 2021-02-25 RX ORDER — AMMONIUM LACTATE 12 G/100G
LOTION TOPICAL
Qty: 1 BOTTLE | Refills: 0 | Status: SHIPPED
Start: 2021-02-25 | End: 2021-09-09 | Stop reason: SDUPTHER

## 2021-03-10 ENCOUNTER — HOSPITAL ENCOUNTER (OUTPATIENT)
Dept: WOUND CARE | Age: 66
Discharge: HOME | End: 2021-03-10
Payer: COMMERCIAL

## 2021-03-10 VITALS
DIASTOLIC BLOOD PRESSURE: 90 MMHG | HEART RATE: 85 BPM | SYSTOLIC BLOOD PRESSURE: 142 MMHG | RESPIRATION RATE: 20 BRPM | TEMPERATURE: 98.5 F

## 2021-03-10 DIAGNOSIS — L97.112: Primary | ICD-10-CM

## 2021-03-10 DIAGNOSIS — S78.111A ABOVE KNEE AMPUTATION OF RIGHT LOWER EXTREMITY (HCC): ICD-10-CM

## 2021-03-10 DIAGNOSIS — T81.32XS: ICD-10-CM

## 2021-03-10 DIAGNOSIS — G89.4 CHRONIC PAIN SYNDROME: ICD-10-CM

## 2021-03-10 PROCEDURE — 11042 DBRDMT SUBQ TIS 1ST 20SQCM/<: CPT | Performed by: SURGERY

## 2021-03-10 PROCEDURE — 11042 DBRDMT SUBQ TIS 1ST 20SQCM/<: CPT

## 2021-03-10 RX ORDER — LIDOCAINE HYDROCHLORIDE 40 MG/ML
SOLUTION TOPICAL ONCE
Status: DISCONTINUED | OUTPATIENT
Start: 2021-03-10 | End: 2021-03-11 | Stop reason: HOSPADM

## 2021-03-10 ASSESSMENT — PAIN DESCRIPTION - LOCATION: LOCATION: HIP

## 2021-03-10 ASSESSMENT — PAIN DESCRIPTION - ORIENTATION: ORIENTATION: RIGHT

## 2021-03-10 ASSESSMENT — PAIN SCALES - GENERAL: PAINLEVEL_OUTOF10: 7

## 2021-03-10 NOTE — TELEPHONE ENCOUNTER
Pt called in for refill of oxycodone 5mg taken every 4-6hr. Current rx end date 3/17-21.  Send rx to [address]    He also states his shower bench broke and is requesting order for a new one faxed to [facility]

## 2021-03-10 NOTE — PROGRESS NOTES
Wound Healing Center Followup Visit Note    Referring Physician : Nohelia Sifuentes [address] RECORD NUMBER:  [id number]  AGE: 63 y.o. GENDER: male  : 1955  EPISODE DATE:  3/10/2021    Subjective:     Chief Complaint   Patient presents with    Wound Check     right hip      HISTORY of PRESENT ILLNESS RONNY Bae is a 63 y.o. male who presents today in regards to follow up evaluation and treatment of wound/ulcer. That patient's past medical, family and social hx were reviewed and changes were made if present. History of Wound Context:  Pt presents in regards to chronic R above knee amputation wound was since 2020. He had developed postoperatively dehiscence of his wound site which had previously undergone a muscle flap. He was having a packed per home health care. The wound had gotten so small that it was difficult to pack. He is still having drainage significant though. At first visit to wound healing center 20 his wound has not been improving. He was not on abx at initial visit to center.     Previous lower extremity procedures  2018  R AKA for chronic R LE wound   3/20/2020 R groin exploration with iliofemoral artery embolectomy, primary closure of arteriotomy, R deep femoral atery embolectomy   2020 Excisional debridement of infected necrotic  tissue, right groin and right thigh   2020 RIGHT THIGH WOUND DRESSING CHANGE and DEBRIDEMENT   2020 Irrigation and excisional debridement of Right Thigh above knee amputation wound   2020 Irrigation and excisional debridement of the Right thigh above knee amputation wound 25 x45 cm  Excision of prosthetic R LE bypass graft  Ligation of right SFA  Posterior hamstring myocutaneous flap 65 cm flap for open wound of 45 x 25 cm per Dr. Nakul Linares     20  · Wound itself is small - opened up as difficult to pack  · 5 cm depth - culture done, significant amount of fluid expressed once opened Factors: edema, diabetes, poor glucose control, chronic pressure, decreased mobility, shear force, obesity and arterial insufficiency    Diabetic/Pressure/Non Pressure Ulcers only:  Ulcer: Diabetic ulcer, fat layer exposed    Wound: Wound dehiscence        PAST MEDICAL HISTORY      Diagnosis Date    Acute kidney injury ([address] 5/18/2020    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 ([address] 5/22/2018    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 ([address] 5/21/2018    Cellulitis, scrotum 3/20/2020    Critical lower limb ischemia 3/20/2020    Diabetes mellitus ([address]     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 2 diabetes mellitus, with fat layer exposed ([address] 5/22/2018    Disruption of closure of muscle or muscle flap, sequela 8/26/2020    DVT, lower extremity ([address]     right leg     Encounter for dental examination and cleaning with abnormal findings 4/2/2018    Gas gangrene of thigh ([address] 3/20/2020    History of DVT (deep vein thrombosis) 7/31/2018    Hyperglycemia due to type 2 diabetes mellitus ([address] 3/20/2020    Hyperlipidemia     Hypertension     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[address]     Osteomyelitis ([address] 10/24/2018    PVD (peripheral vascular disease) ([address]      Past Surgical History:   Procedure Laterality Date    AMPUTATION ABOVE KNEE Right 4/28/2020    DEBRIDEMENT OF AMPUTATION RIGHT ABOVE KNEE performed by Belle Sanches MD at [address] [facility] Right 4/30/2020    DEBRIDEMENT OF AMPUTATION RIGHT ABOVE KNEE,  POSS. ABOVE KNEE REVISION, POSS. CLOSURE, POSS.WOUND VAC -- BEN Phillips / Velma Quezada TO LOOK IN performed by Belle Sanches MD at [place] [address] Right 3/20/2020    RIGHT LOWER EXTREMITY THROMBECTOMY, POSSIBLE ANGIOGRAM, POSSIBLE INTERVENTION, POSSIBLE BYPASS.  performed by Belle Sanches MD at [address] [place] [address] Right 4/17/2020    RIGHT LEG DEBRIDEMENT INCISION AND DRAINAGE performed by Cydney Vegas MD at [address] Right 4/20/2020    RIGHT THIGH WOUND DRESSING CHANGE; DEBRIDEMENT, removal of muscle performed by Cydney Vegas MD at [address] Right 4/21/2020    RIGHT THIGH WOUND DRESSING CHANGE POSSIBLE  DEBRIDEMENT - NEEDS BEN Davis / JANETTE TO SEE performed by Destiny Miller MD at [address] Right 4/23/2020    RIGHT THIGH WOUND DRESSING CHANGE and DEBRIDEMENT performed by Marlee Morton MD at [address] Right 10/15/2020    DEBRIDEMENT RIGHT ABOVE KNEE AMPUTATION POSS. REVISION POSS. WOUND VAC performed by Marlee Morton MD at [address] Right 12/17/2020    DEBRIDEMENT  RIGHT ABOVE KNEE AMPUTATION WITH POSS.  ADVANCED SKIN THERAPY performed by Marlee Morton MD at [address] [facility] Right 11/1/2018    AMPUTATION ABOVE KNEE RIGHT LEG performed by Marlee Morton MD at [facility] Right 5/24/2018    RIGHT FOOT INCISION AND DRAINAGE WITH PARTIAL BONE RESECTION performed by Orlando Solorio DPM at [address] [facility] OFFICE/OUTPT VISIT,PROCEDURE ONLY Right 8/3/2018    INCISION AND DRAINAGE MULTIPLE AREAS RIGHT FOOT WITH DEBRIDEMENT SOFT TISSUE performed by Orlando [facility] DPM at [facility] OR    RHINOPLASTY Right     leg      Family History   Problem Relation Age of Onset    Cancer Mother     Diabetes Father     Heart Failure Father     Hypertension Father     Cancer Sister      Social History     Tobacco Use    Smoking status: Current Every Day Smoker     Packs/day: 0.50     Years: 7.00     Pack years: 3.50     Types: Cigarettes    Smokeless tobacco: Never Used   Substance Use Topics    Alcohol use: No     Frequency: Never    Drug use: No     No Known Allergies  Current Outpatient Medications on File Prior to Encounter   Medication Sig Dispense Refill    ammonium lactate (LAC-HYDRIN) 12 % lotion Apply topically as needed. 1 Bottle 0    metoprolol tartrate (LOPRESSOR) 25 MG tablet Take 1 tablet by mouth 2 times daily 90 tablet 1    oxyCODONE (OXYCONTIN) 10 MG extended release tablet Take 1 tablet by mouth every 12 hours as needed for Pain for up to 30 days. 60 each 0    gabapentin (NEURONTIN) 800 MG tablet Take 1 tablet by mouth 4 times daily for 30 days. 120 tablet 5    oxyCODONE (ROXICODONE) 5 MG immediate release tablet Take 1 tablet by mouth every 4-6 hours as needed for Pain for up to 30 days. 180 tablet 0    hydrALAZINE (APRESOLINE) 25 MG tablet Take 1 tablet by mouth 3 times daily 90 tablet 3    amLODIPine (NORVASC) 10 MG tablet Take 1 tablet by mouth daily 90 tablet 1    chlorthalidone (HYGROTON) 25 MG tablet Take 1 tablet by mouth daily 30 tablet 3    cloNIDine (CATAPRES) 0.1 MG tablet TAKE ONE TABLET BY MOUTH THREE TIMES DAILY 270 tablet 3    cilostazol (PLETAL) 50 MG tablet Take 1 tablet by mouth 2 times daily 180 tablet 1    mineral oil-hydrophilic petrolatum (AQUAPHOR) ointment Apply topically twice daily dispense 14 ounce jar 396 g 5    mirtazapine (REMERON) 7.5 MG tablet Take 1 tablet by mouth nightly 90 tablet 1    DULoxetine (CYMBALTA) 60 MG extended release capsule Take 2 capsules by mouth every morning 180 capsule 1    blood glucose test strips (ONETOUCH ULTRA) strip 1 each by Other route 3 times daily As needed.  300 each 1    insulin lispro (HUMALOG) 100 UNIT/ML injection vial Inject 0-12 Units into the skin 3 times daily (with meals) 1 vial 3    Insulin Syringe-Needle U-100 (ULTICARE INSULIN SYRINGE) 31G X 5/16\" 0.3 ML MISC 1 each by Other route 5 times daily 100 each 3    glucose (GLUTOSE) 40 % GEL Take 37.5 mLs by mouth as needed (hypoglycemia) 45 g 1    bisacodyl (DULCOLAX) 5 MG EC tablet Take 1 tablet by mouth daily as needed for Constipation 30 tablet 0    docusate sodium (COLACE) 100 MG capsule Take 1 capsule by mouth 2 times daily (Patient taking differently: Take 100 mg by mouth 2 times daily as needed ) 50 capsule 0    Handicap Placard MISC by Does not apply route Patient cannot walk 200 ft without stopping to rest.    Expiration 10/21/2024 1 each 0     No current facility-administered medications on file prior to encounter. REVIEW OF SYSTEMS See HPI    Objective:    BP (!) 142/90   Pulse 85   Temp 98.5 °F (36.9 °C) (Temporal)   Resp 20   Wt Readings from Last 3 Encounters:   02/24/21 245 lb (111.1 kg)   02/22/21 245 lb (111.1 kg)   02/10/21 245 lb (111.1 kg)     PHYSICAL EXAM  CONSTITUTIONAL:   Awake, alert, cooperative   EYES:  lids and lashes normal   ENT: external ears and nose without lesions   NECK:  supple, symmetrical, trachea midline   SKIN:  Open wound Present    Assessment:     Problem List Items Addressed This Visit     Disruption of closure of muscle or muscle flap, sequela (Chronic)    Ischemic ulcer of right thigh with fat layer exposed ([address] - Primary    Above knee amputation of right lower extremity ([address]          Pre Debridement Measurements:  Are located in the [facility]  Documentation Flow Sheet  Post Debridement Measurements:  Wound/Ulcer Descriptions are Pre Debridement except measurements:     Wound 12/17/20 Thigh Right;Lateral #1 (Active)   Wound Image   03/10/21 1117   Dressing Status New dressing applied;Clean;Dry; Intact 02/10/21 1138   Wound Cleansed Cleansed with saline 02/10/21 1138   Dressing/Treatment ABD; Alginate;Dry dressing 02/10/21 1138   Offloading for Diabetic Foot Ulcers Other (comment) 02/10/21 1138   Wound Length (cm) 0.4 cm 03/10/21 1114   Wound Width (cm) 0.5 cm 03/10/21 1114   Wound Depth (cm) 3.9 cm 03/10/21 1114   Wound Surface Area (cm^2) 0.2 cm^2 03/10/21 1114   Change in Wound Size % (l*w) 97.45 03/10/21 1114   Wound Volume (cm^3) 0.78 cm^3 03/10/21 1114   Wound Healing % 97 03/10/21 1114   Post-Procedure Length (cm) 0.5 cm 03/10/21 1211   Post-Procedure Width (cm) 0.7 cm 03/10/21 1211   Post-Procedure Depth (cm) 4 cm 03/10/21 1211   Post-Procedure Surface Area (cm^2) 0.35 cm^2 03/10/21 1211   Post-Procedure Volume (cm^3) 1.4 cm^3 03/10/21 1211   Wound Assessment Other (Comment) 03/10/21 1114   Drainage Amount Large 03/10/21 1114   Drainage Description Serosanguinous;Brown 03/10/21 1114   Odor None 03/10/21 1114   Nadia-wound Assessment Blanchable erythema; Intact 03/10/21 1114   Number of days: 82     Incision 04/28/20 Thigh Right;Medial (Active)   Number of days: 316   Procedure Note  Indications:  Based on my examination of this patient's wound(s)/ulcer(s) today, debridement is required to promote healing and evaluate the wound base. Performed by: Negro Negron MD    Consent obtained:  Yes    Time out taken:  Yes    Pain Control: Anesthetic  Anesthetic: 4% Lidocaine Liquid Topical     Debridement:Excisional Debridement    Using curette the wound(s)/ulcer(s) was/were sharply debrided down through and including the removal of epidermis, dermis, subq,     Devitalized Tissue Debrided:  fibrin, biofilm, slough and exudate to stimulate bleeding to promote healing, post debridement good bleeding base and wound edges noted    Wound/Ulcer #: 1    Percent of Wound/Ulcer Debrided: 100%    Total Surface Area Debrided: 0.2 sq cm     Estimated Blood Loss:  Minimal  Hemostasis Achieved:  by pressure    Procedural Pain:  4  / 10   Post Procedural Pain:  3 / 10      Response to treatment:  Well tolerated    Plan:   Treatment Note please see attached Discharge Instructions    Written patient dismissal instructions given to patient and signed by patient or POA.          Discharge Instructions       Visit Discharge/Physician Orders     Discharge condition: Stable     Assessment of pain at discharge:yes     Anesthetic used: 4% lidocaine soln.     Discharge to: Home     Left via:Private automobile     Accompanied by: spouse     [facility]: [facility]     Dressing Orders:RIGHT AKA SITE-Cleanse with normal saline, pack loosely with calcium alginate, dry dressing and secure. Change daily.     Treatment Orders:Eat a diet high in protein and vitamin C. Take a multiple vitamin daily unless contraindicated.      [address] [place] [address],[address] followup visit: 1 week _____________________________  (Please note your next appointment above and if you are unable to keep, kindly give a 24 hour notice.  Thank you.)     Physician signature:__________________________  Juan Pelican  If you experience any of the following, please call the [facility] during business hours:     * Increase in Pain  * Temperature over 101  * Increase in drainage from your wound  * Drainage with a foul odor  * Bleeding  * Increase in swelling  * Need for compression bandage changes due to slippage, breakthrough drainage.     If you need medical attention outside of the business hours of the [facility] please contact your PCP or go to the nearest emergency room.                                                                           Electronically signed by Helder Pace MD

## 2021-03-10 NOTE — PLAN OF CARE
Problem: Pain:  Goal: Pain level will decrease  Description: Pain level will decrease  Outcome: Ongoing  Goal: Control of acute pain  Description: Control of acute pain  Outcome: Ongoing  Goal: Control of chronic pain  Description: Control of chronic pain  Outcome: Ongoing     Problem: Wound:  Goal: Will show signs of wound healing; wound closure and no evidence of infection  Description: Will show signs of wound healing; wound closure and no evidence of infection  Outcome: Ongoing     Problem: Arterial:  Goal: Optimize blood flow for wound healing  Description: Optimize blood flow for wound healing  Outcome: Met This Shift     Problem: Smoking cessation:  Goal: Ability to formulate a plan to maintain a tobacco-free life will be supported  Description: Ability to formulate a plan to maintain a tobacco-free life will be supported  Outcome: Ongoing     Problem: Weight control:  Goal: Ability to maintain an optimal weight for height and age will be supported  Description: Ability to maintain an optimal weight for height and age will be supported  Outcome: Met This Shift     Problem: Falls - Risk of:  Goal: Will remain free from falls  Description: Will remain free from falls  Outcome: Met This Shift

## 2021-03-11 RX ORDER — OXYCODONE HYDROCHLORIDE 5 MG/1
5 TABLET ORAL
Qty: 180 TABLET | Refills: 0 | Status: SHIPPED
Start: 2021-03-16 | End: 2021-04-14 | Stop reason: SDUPTHER

## 2021-03-17 ENCOUNTER — IMMUNIZATION (OUTPATIENT)
Dept: PRIMARY CARE CLINIC | Age: 66
End: 2021-03-17
Payer: COMMERCIAL

## 2021-03-17 PROCEDURE — 0002A COVID-19, PFIZER VACCINE 30MCG/0.3ML DOSE: CPT | Performed by: PHYSICIAN ASSISTANT

## 2021-03-17 PROCEDURE — 91300 COVID-19, PFIZER VACCINE 30MCG/0.3ML DOSE: CPT | Performed by: PHYSICIAN ASSISTANT

## 2021-03-23 DIAGNOSIS — S78.111A ABOVE KNEE AMPUTATION OF RIGHT LOWER EXTREMITY (HCC): ICD-10-CM

## 2021-03-23 DIAGNOSIS — G89.4 CHRONIC PAIN SYNDROME: ICD-10-CM

## 2021-03-23 RX ORDER — OXYCODONE HCL 10 MG/1
10 TABLET, FILM COATED, EXTENDED RELEASE ORAL EVERY 12 HOURS PRN
Qty: 60 EACH | Refills: 0 | Status: SHIPPED
Start: 2021-03-23 | End: 2021-04-26 | Stop reason: SDUPTHER

## 2021-03-24 ENCOUNTER — HOSPITAL ENCOUNTER (OUTPATIENT)
Dept: WOUND CARE | Age: 66
Discharge: HOME | End: 2021-03-24
Payer: COMMERCIAL

## 2021-03-24 VITALS
SYSTOLIC BLOOD PRESSURE: 142 MMHG | BODY MASS INDEX: 31.44 KG/M2 | TEMPERATURE: 97.7 F | HEIGHT: 74 IN | WEIGHT: 245 LBS | RESPIRATION RATE: 20 BRPM | DIASTOLIC BLOOD PRESSURE: 70 MMHG | HEART RATE: 78 BPM

## 2021-03-24 DIAGNOSIS — T81.32XS: ICD-10-CM

## 2021-03-24 PROCEDURE — 11042 DBRDMT SUBQ TIS 1ST 20SQCM/<: CPT | Performed by: SURGERY

## 2021-03-24 PROCEDURE — 11042 DBRDMT SUBQ TIS 1ST 20SQCM/<: CPT

## 2021-03-24 RX ORDER — LIDOCAINE HYDROCHLORIDE 40 MG/ML
SOLUTION TOPICAL ONCE
Status: DISCONTINUED | OUTPATIENT
Start: 2021-03-24 | End: 2021-03-25 | Stop reason: HOSPADM

## 2021-03-30 LAB — DIABETIC RETINOPATHY: NEGATIVE

## 2021-03-31 ENCOUNTER — HOSPITAL ENCOUNTER (OUTPATIENT)
Dept: WOUND CARE | Age: 66
Discharge: HOME | End: 2021-03-31
Payer: COMMERCIAL

## 2021-03-31 VITALS
DIASTOLIC BLOOD PRESSURE: 98 MMHG | SYSTOLIC BLOOD PRESSURE: 170 MMHG | HEART RATE: 96 BPM | RESPIRATION RATE: 18 BRPM | TEMPERATURE: 97.5 F

## 2021-03-31 DIAGNOSIS — T81.32XS: Chronic | ICD-10-CM

## 2021-03-31 DIAGNOSIS — L97.112: Primary | ICD-10-CM

## 2021-03-31 PROCEDURE — 11042 DBRDMT SUBQ TIS 1ST 20SQCM/<: CPT | Performed by: SURGERY

## 2021-03-31 PROCEDURE — 11042 DBRDMT SUBQ TIS 1ST 20SQCM/<: CPT

## 2021-03-31 PROCEDURE — 6370000000 HC RX 637 (ALT 250 FOR IP): Performed by: SURGERY

## 2021-03-31 RX ORDER — LIDOCAINE HYDROCHLORIDE 20 MG/ML
JELLY TOPICAL ONCE
Status: CANCELLED | OUTPATIENT
Start: 2021-03-31 | End: 2021-03-31

## 2021-03-31 RX ORDER — BACITRACIN ZINC AND POLYMYXIN B SULFATE 500; 1000 [USP'U]/G; [USP'U]/G
OINTMENT TOPICAL ONCE
Status: CANCELLED | OUTPATIENT
Start: 2021-03-31 | End: 2021-03-31

## 2021-03-31 RX ORDER — GENTAMICIN SULFATE 1 MG/G
OINTMENT TOPICAL ONCE
Status: CANCELLED | OUTPATIENT
Start: 2021-03-31 | End: 2021-03-31

## 2021-03-31 RX ORDER — BETAMETHASONE DIPROPIONATE 0.05 %
OINTMENT (GRAM) TOPICAL ONCE
Status: CANCELLED | OUTPATIENT
Start: 2021-03-31 | End: 2021-03-31

## 2021-03-31 RX ORDER — LIDOCAINE HYDROCHLORIDE 40 MG/ML
SOLUTION TOPICAL ONCE
Status: DISCONTINUED | OUTPATIENT
Start: 2021-03-31 | End: 2021-03-31 | Stop reason: CLARIF

## 2021-03-31 RX ORDER — CLOBETASOL PROPIONATE 0.5 MG/G
OINTMENT TOPICAL ONCE
Status: CANCELLED | OUTPATIENT
Start: 2021-03-31 | End: 2021-03-31

## 2021-03-31 RX ORDER — LIDOCAINE HYDROCHLORIDE 40 MG/ML
SOLUTION TOPICAL ONCE
Status: CANCELLED | OUTPATIENT
Start: 2021-03-31 | End: 2021-03-31

## 2021-03-31 RX ORDER — GINSENG 100 MG
CAPSULE ORAL ONCE
Status: CANCELLED | OUTPATIENT
Start: 2021-03-31 | End: 2021-03-31

## 2021-03-31 RX ORDER — BACITRACIN, NEOMYCIN, POLYMYXIN B 400; 3.5; 5 [USP'U]/G; MG/G; [USP'U]/G
OINTMENT TOPICAL ONCE
Status: CANCELLED | OUTPATIENT
Start: 2021-03-31 | End: 2021-03-31

## 2021-03-31 RX ORDER — LIDOCAINE 40 MG/G
CREAM TOPICAL ONCE
Status: CANCELLED | OUTPATIENT
Start: 2021-03-31 | End: 2021-03-31

## 2021-03-31 RX ORDER — LIDOCAINE 50 MG/G
OINTMENT TOPICAL ONCE
Status: CANCELLED | OUTPATIENT
Start: 2021-03-31 | End: 2021-03-31

## 2021-03-31 RX ORDER — LIDOCAINE HYDROCHLORIDE 40 MG/ML
SOLUTION TOPICAL ONCE
Status: COMPLETED | OUTPATIENT
Start: 2021-03-31 | End: 2021-03-31

## 2021-03-31 RX ADMIN — LIDOCAINE HYDROCHLORIDE 20 ML: 40 SOLUTION TOPICAL at 12:03

## 2021-03-31 ASSESSMENT — PAIN SCALES - GENERAL: PAINLEVEL_OUTOF10: 6

## 2021-03-31 ASSESSMENT — PAIN DESCRIPTION - LOCATION: LOCATION: LEG

## 2021-03-31 ASSESSMENT — PAIN DESCRIPTION - FREQUENCY: FREQUENCY: CONTINUOUS

## 2021-03-31 ASSESSMENT — PAIN - FUNCTIONAL ASSESSMENT: PAIN_FUNCTIONAL_ASSESSMENT: ACTIVITIES ARE NOT PREVENTED

## 2021-03-31 ASSESSMENT — PAIN DESCRIPTION - PROGRESSION: CLINICAL_PROGRESSION: GRADUALLY IMPROVING

## 2021-03-31 ASSESSMENT — PAIN DESCRIPTION - ORIENTATION: ORIENTATION: RIGHT

## 2021-03-31 NOTE — PROGRESS NOTES
Wound Healing Center Followup Visit Note    Referring Physician : Nohelia Norris Str RECORD NUMBER:  [id number]  AGE: 63 y.o. GENDER: male  : 1955  EPISODE DATE:  3/24/2021    Subjective:     Chief Complaint   Patient presents with    Wound Check     right hip      HISTORY of PRESENT ILLNESS HPI   Hilton Epley is a 63 y.o. male who presents today in regards to follow up evaluation and treatment of wound/ulcer. That patient's past medical, family and social hx were reviewed and changes were made if present. History of Wound Context:  Pt presents in regards to chronic R above knee amputation wound was since 2020. He had developed postoperatively dehiscence of his wound site which had previously undergone a muscle flap. He was having a packed per home health care. The wound had gotten so small that it was difficult to pack. He is still having drainage significant though. At first visit to wound healing center 20 his wound has not been improving. He was not on abx at initial visit to center.     Previous lower extremity procedures  2018  R AKA for chronic R LE wound   3/20/2020 R groin exploration with iliofemoral artery embolectomy, primary closure of arteriotomy, R deep femoral atery embolectomy   2020 Excisional debridement of infected necrotic  tissue, right groin and right thigh   2020 RIGHT THIGH WOUND DRESSING CHANGE and DEBRIDEMENT   2020 Irrigation and excisional debridement of Right Thigh above knee amputation wound   2020 Irrigation and excisional debridement of the Right thigh above knee amputation wound 25 x45 cm  Excision of prosthetic R LE bypass graft  Ligation of right SFA  Posterior hamstring myocutaneous flap 65 cm flap for open wound of 45 x 25 cm per Dr. Kiser Post     20  · Wound itself is small - opened up as difficult to pack  · 5 cm depth - culture done, significant amount of fluid expressed once opened up  · aquacell ag packing  · Discussed potential need for further surgical exploration in future - pt would like to avoid hospital and OR if possible  9/2/20  · Wound much improved - depth less  · Start doxycyline for staph culture  9/9/20  · Depth 5 cm again, fluid seems less  9/23/20  · Tunneling now 8 cm, concern that hittig bone  · Discussed with patient continuing current reigmen unlikely to result in healing  · Would benefit from further surgical debridement, likely revision of amputation site and possible wound vac  · Patient would like to consider options and will let me know next week  9/30/20  · Significant tunneling still  · Will proceed with or in 2 weeks per pt request  10/21/20  · OR 10/14/20  · dakins moistened kerlex  · Discussed with Dr. Erendira Chirinos re pain control issues  · Doxycycline 10 days script given  10/28/20  · Pain better controlled  · + Granulation tissue  · Bone less exposed  11/4/20  · Wound health  · Bone still exposed  11/18/20  · Wound continues to be improved  · Less bone exposed  11/25/20  · Bone exposure still an issue  · Will take for surgery - try dermagraft  · If not successful will likely need revision in future  12/9/20  · Bone exposure, wound overall improvd  · Patient did not follow though with testing for surgery  12/17/20  · dermagraft application  23/45/33  · Wound improved, bone still exposed  1/6/21  · Wound improved, bone still exposed  1/13/21  · Bone almost completely covered  1/27/21  · Bone covered, wound improved  2/10/21  · wound improved,depth less  2/24/21  · Wound stable - 3.5 cm depth  3/10/21  · Wound stable 4 cm depth  3/24/21  · Wound stable, same depth not improving - discussed with patient concerning and may need further debridement    Wound/Ulcer Pain Timing/Severity: moderate  Quality of pain: aching, throbbing, shooting   Severity:  3 / 10   Modifying Factors: Pain worsens with dressing changes, pressure, debridement  Associated Signs/Symptoms: drainage and pain    Ulcer Identification:  Ulcer Type: arterial, diabetic, pressure and non-healing surgical  Contributing Factors: edema, diabetes, poor glucose control, chronic pressure, decreased mobility, shear force, obesity and arterial insufficiency    Diabetic/Pressure/Non Pressure Ulcers only:  Ulcer: Diabetic ulcer, fat layer exposed    Wound: Wound dehiscence        PAST MEDICAL HISTORY      Diagnosis Date    Acute kidney injury ([address] 5/18/2020    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 ([address] 5/22/2018    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 ([address] 5/21/2018    Cellulitis, scrotum 3/20/2020    Critical lower limb ischemia 3/20/2020    Diabetes mellitus ([address]     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 2 diabetes mellitus, with fat layer exposed ([address] 5/22/2018    Disruption of closure of muscle or muscle flap, sequela 8/26/2020    DVT, lower extremity ([address]     right leg     Encounter for dental examination and cleaning with abnormal findings 4/2/2018    Gas gangrene of thigh ([address] 3/20/2020    History of DVT (deep vein thrombosis) 7/31/2018    Hyperglycemia due to type 2 diabetes mellitus ([address] 3/20/2020    Hyperlipidemia     Hypertension     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[address]     Osteomyelitis ([address] 10/24/2018    PVD (peripheral vascular disease) ([address]      Past Surgical History:   Procedure Laterality Date    AMPUTATION ABOVE KNEE Right 4/28/2020    DEBRIDEMENT OF AMPUTATION RIGHT ABOVE KNEE performed by Quentin Prieto MD at [facility] Right 4/30/2020    DEBRIDEMENT OF AMPUTATION RIGHT ABOVE KNEE,  POSS. ABOVE KNEE REVISION, POSS. CLOSURE, POSS.WOUND VAC -- BEN Goldberg / [address] performed by Quentin Prieto MD at [address] Right 3/20/2020    RIGHT LOWER EXTREMITY THROMBECTOMY, POSSIBLE ANGIOGRAM, POSSIBLE INTERVENTION, POSSIBLE BYPASS. performed by Juan Lake MD at [address] Right 4/17/2020    RIGHT LEG DEBRIDEMENT INCISION AND DRAINAGE performed by Del Barnard MD at [address] [address] Right 4/20/2020    RIGHT THIGH WOUND DRESSING CHANGE; DEBRIDEMENT, removal of muscle performed by Del Barnard MD at [address] Right 4/21/2020    RIGHT THIGH WOUND DRESSING CHANGE POSSIBLE  DEBRIDEMENT - NEEDS BEN Muñiz / JANETTE TO SEE performed by Raúl Rust MD at [address] Right 4/23/2020    RIGHT THIGH WOUND DRESSING CHANGE and DEBRIDEMENT performed by Juan Lake MD at [address] [address] Right 10/15/2020    DEBRIDEMENT RIGHT ABOVE KNEE AMPUTATION POSS. REVISION POSS. WOUND VAC performed by Juan Lake MD at [address] [address] Right 12/17/2020    DEBRIDEMENT  RIGHT ABOVE KNEE AMPUTATION WITH POSS. ADVANCED SKIN THERAPY performed by Juan Lake MD at [facility] Right 11/1/2018    AMPUTATION ABOVE KNEE RIGHT LEG performed by Juan Lake MD at [facility] Right 5/24/2018    RIGHT FOOT INCISION AND DRAINAGE WITH PARTIAL BONE RESECTION performed by Terrell Ramirez DPM at [address] [place] [address] OFFICE/OUTPT VISIT,PROCEDURE ONLY Right 8/3/2018    INCISION AND DRAINAGE MULTIPLE AREAS RIGHT FOOT WITH DEBRIDEMENT SOFT TISSUE performed by Terrell Ramirez DPM at [facility] OR    RHINOPLASTY Right     leg      Family History   Problem Relation Age of Onset    Cancer Mother     Diabetes Father     Heart Failure Father     Hypertension Father     Cancer Sister      Social History     Tobacco Use    Smoking status: Current Every Day Smoker     Packs/day: 0.50     Years: 7.00     Pack years: 3.50     Types: Cigarettes    Smokeless tobacco: Never Used   Substance Use Topics    Alcohol use: No     Frequency: Never    Drug use:  No No Known Allergies  Current Outpatient Medications on File Prior to Encounter   Medication Sig Dispense Refill    oxyCODONE (OXYCONTIN) 10 MG extended release tablet Take 1 tablet by mouth every 12 hours as needed for Pain for up to 30 days. 60 each 0    oxyCODONE (ROXICODONE) 5 MG immediate release tablet Take 1 tablet by mouth every 4-6 hours as needed for Pain for up to 30 days. 180 tablet 0    metoprolol tartrate (LOPRESSOR) 25 MG tablet Take 1 tablet by mouth 2 times daily 90 tablet 1    gabapentin (NEURONTIN) 800 MG tablet Take 1 tablet by mouth 4 times daily for 30 days. 120 tablet 5    hydrALAZINE (APRESOLINE) 25 MG tablet Take 1 tablet by mouth 3 times daily 90 tablet 3    amLODIPine (NORVASC) 10 MG tablet Take 1 tablet by mouth daily 90 tablet 1    chlorthalidone (HYGROTON) 25 MG tablet Take 1 tablet by mouth daily 30 tablet 3    cloNIDine (CATAPRES) 0.1 MG tablet TAKE ONE TABLET BY MOUTH THREE TIMES DAILY 270 tablet 3    cilostazol (PLETAL) 50 MG tablet Take 1 tablet by mouth 2 times daily 180 tablet 1    mirtazapine (REMERON) 7.5 MG tablet Take 1 tablet by mouth nightly 90 tablet 1    DULoxetine (CYMBALTA) 60 MG extended release capsule Take 2 capsules by mouth every morning 180 capsule 1    insulin lispro (HUMALOG) 100 UNIT/ML injection vial Inject 0-12 Units into the skin 3 times daily (with meals) 1 vial 3    docusate sodium (COLACE) 100 MG capsule Take 1 capsule by mouth 2 times daily (Patient taking differently: Take 100 mg by mouth 2 times daily as needed ) 50 capsule 0    ammonium lactate (LAC-HYDRIN) 12 % lotion Apply topically as needed. 1 Bottle 0    mineral oil-hydrophilic petrolatum (AQUAPHOR) ointment Apply topically twice daily dispense 14 ounce jar 396 g 5    blood glucose test strips (ONETOUCH ULTRA) strip 1 each by Other route 3 times daily As needed.  300 each 1    Insulin Syringe-Needle U-100 (ULTICARE INSULIN SYRINGE) 31G X 5/16\" 0.3 ML MISC 1 each by Other route 5 times daily 100 each 3    glucose (GLUTOSE) 40 % GEL Take 37.5 mLs by mouth as needed (hypoglycemia) 45 g 1    bisacodyl (DULCOLAX) 5 MG EC tablet Take 1 tablet by mouth daily as needed for Constipation 30 tablet 0    Handicap Placard MISC by Does not apply route Patient cannot walk 200 ft without stopping to rest.    Expiration 10/21/2024 1 each 0     No current facility-administered medications on file prior to encounter. REVIEW OF SYSTEMS See HPI    Objective:    BP (!) 142/70   Pulse 78   Temp 97.7 °F (36.5 °C) (Temporal)   Resp 20   Ht 6' 2\" (1.88 m)   Wt 245 lb (111.1 kg)   BMI 31.46 kg/m²   Wt Readings from Last 3 Encounters:   03/24/21 245 lb (111.1 kg)   02/24/21 245 lb (111.1 kg)   02/22/21 245 lb (111.1 kg)     PHYSICAL EXAM  CONSTITUTIONAL:   Awake, alert, cooperative   EYES:  lids and lashes normal   ENT: external ears and nose without lesions   NECK:  supple, symmetrical, trachea midline   SKIN:  Open wound Present    Assessment:     Problem List Items Addressed This Visit     Disruption of closure of muscle or muscle flap, sequela (Chronic)          Pre Debridement Measurements:  Are located in the [facility]  Documentation Flow Sheet  Post Debridement Measurements:  Wound/Ulcer Descriptions are Pre Debridement except measurements:     Wound 12/17/20 Thigh Right;Lateral #1 (Active)   Wound Image   03/10/21 1117   Dressing Status New dressing applied 03/24/21 1237   Wound Cleansed Cleansed with saline 03/24/21 1237   Dressing/Treatment ABD; Alginate 03/24/21 1237   Offloading for Diabetic Foot Ulcers No offloading required 03/24/21 1237   Wound Length (cm) 0.5 cm 03/24/21 1132   Wound Width (cm) 0.5 cm 03/24/21 1132   Wound Depth (cm) 3.8 cm 03/24/21 1132   Wound Surface Area (cm^2) 0.25 cm^2 03/24/21 1132   Change in Wound Size % (l*w) 96.81 03/24/21 1132   Wound Volume (cm^3) 0.95 cm^3 03/24/21 1132   Wound Healing % 97 03/24/21 1132   Post-Procedure Length (cm) 0.5 cm 03/24/21 0974 Post-Procedure Width (cm) 0.5 cm 03/24/21 1229   Post-Procedure Depth (cm) 3.9 cm 03/24/21 1229   Post-Procedure Surface Area (cm^2) 0.25 cm^2 03/24/21 1229   Post-Procedure Volume (cm^3) 0.98 cm^3 03/24/21 1229   Wound Assessment Other (Comment) 03/24/21 1132   Drainage Amount Large 03/24/21 1132   Drainage Description Thick;Purulent;Serosanguinous 03/24/21 1132   Odor None 03/24/21 1132   Nadia-wound Assessment Intact 03/24/21 1132   Number of days: 103     Incision 04/28/20 Thigh Right;Medial (Active)   Number of days: 336   Procedure Note  Indications:  Based on my examination of this patient's wound(s)/ulcer(s) today, debridement is required to promote healing and evaluate the wound base. Performed by: Mari Avitia MD    Consent obtained:  Yes    Time out taken:  Yes    Pain Control: Anesthetic  Anesthetic: 4% Lidocaine Liquid Topical     Debridement:Excisional Debridement    Using curette the wound(s)/ulcer(s) was/were sharply debrided down through and including the removal of epidermis, dermis, subq,     Devitalized Tissue Debrided:  fibrin, biofilm, slough and exudate to stimulate bleeding to promote healing, post debridement good bleeding base and wound edges noted    Wound/Ulcer #: 1    Percent of Wound/Ulcer Debrided: 100%    Total Surface Area Debrided: 0.25 sq cm     Estimated Blood Loss:  Minimal  Hemostasis Achieved:  by pressure    Procedural Pain:  4  / 10   Post Procedural Pain:  3 / 10      Response to treatment:  Well tolerated    Plan:   Treatment Note please see attached Discharge Instructions    Written patient dismissal instructions given to patient and signed by patient or POA.          Discharge Instructions       Visit Discharge/Physician Orders     Discharge condition: Stable     Assessment of pain at discharge:yes     Anesthetic used: 4% lidocaine soln.     Discharge to: Home     Left via:Private automobile     Accompanied by: spouse     ECF/HHA: [facility]     Dressing Orders:RIGHT AKA SITE-Cleanse with normal saline, pack loosely with calcium alginate, dry dressing and secure. Change daily.     Treatment Orders:Eat a diet high in protein and vitamin C. Take a multiple vitamin daily unless contraindicated.      [address] [place] [address],[address] followup visit: 1 week _____________________________  (Please note your next appointment above and if you are unable to keep, kindly give a 24 hour notice.  Thank you.)     Physician signature:__________________________  Dayna Barroso  If you experience any of the following, please call the [address] during business hours:     * Increase in Pain  * Temperature over 101  * Increase in drainage from your wound  * Drainage with a foul odor  * Bleeding  * Increase in swelling  * Need for compression bandage changes due to slippage, breakthrough drainage.     If you need medical attention outside of the business hours of the [address] please contact your PCP or go to the nearest emergency room.                                       Electronically signed by Virginie Mancia MD

## 2021-03-31 NOTE — PROGRESS NOTES
Wound Healing Center Followup Visit Note    Referring Physician : Nohelia Norris Str RECORD NUMBER:  [id number]  AGE: 63 y.o. GENDER: male  : 1955  EPISODE DATE:  3/31/2021    Subjective:     Chief Complaint   Patient presents with    Wound Check     rt leg       HISTORY of PRESENT ILLNESS HPI   Lisa Abrams is a 63 y.o. male who presents today in regards to follow up evaluation and treatment of wound/ulcer. That patient's past medical, family and social hx were reviewed and changes were made if present. History of Wound Context:  Pt presents in regards to chronic R above knee amputation wound was since 2020. He had developed postoperatively dehiscence of his wound site which had previously undergone a muscle flap. He was having a packed per home health care. The wound had gotten so small that it was difficult to pack. He is still having drainage significant though. At first visit to wound healing center 20 his wound has not been improving. He was not on abx at initial visit to center.     Previous lower extremity procedures  2018  R AKA for chronic R LE wound   3/20/2020 R groin exploration with iliofemoral artery embolectomy, primary closure of arteriotomy, R deep femoral atery embolectomy   2020 Excisional debridement of infected necrotic  tissue, right groin and right thigh   2020 RIGHT THIGH WOUND DRESSING CHANGE and DEBRIDEMENT   2020 Irrigation and excisional debridement of Right Thigh above knee amputation wound   2020 Irrigation and excisional debridement of the Right thigh above knee amputation wound 25 x45 cm  Excision of prosthetic R LE bypass graft  Ligation of right SFA  Posterior hamstring myocutaneous flap 65 cm flap for open wound of 45 x 25 cm per Dr. Brian Rajan     20  · Wound itself is small - opened up as difficult to pack  · 5 cm depth - culture done, significant amount of fluid expressed once opened up  · aquacell ag packing  · Discussed potential need for further surgical exploration in future - pt would like to avoid hospital and OR if possible  9/2/20  · Wound much improved - depth less  · Start doxycyline for staph culture  9/9/20  · Depth 5 cm again, fluid seems less  9/23/20  · Tunneling now 8 cm, concern that hittig bone  · Discussed with patient continuing current reigmen unlikely to result in healing  · Would benefit from further surgical debridement, likely revision of amputation site and possible wound vac  · Patient would like to consider options and will let me know next week  9/30/20  · Significant tunneling still  · Will proceed with or in 2 weeks per pt request  10/21/20  · OR 10/14/20  · dakins moistened kerlex  · Discussed with Dr. Beryle Moh re pain control issues  · Doxycycline 10 days script given  10/28/20  · Pain better controlled  · + Granulation tissue  · Bone less exposed  11/4/20  · Wound health  · Bone still exposed  11/18/20  · Wound continues to be improved  · Less bone exposed  11/25/20  · Bone exposure still an issue  · Will take for surgery - try dermagraft  · If not successful will likely need revision in future  12/9/20  · Bone exposure, wound overall improvd  · Patient did not follow though with testing for surgery  12/17/20  · dermagraft application  88/46/35  · Wound improved, bone still exposed  1/6/21  · Wound improved, bone still exposed  1/13/21  · Bone almost completely covered  1/27/21  · Bone covered, wound improved  2/10/21  · wound improved,depth less  2/24/21  · Wound stable - 3.5 cm depth  3/10/21  · Wound stable 4 cm depth  3/24/21  · Wound stable, same depth not improving - discussed with patient concerning and may need further debridement  3/31/21  · Depth improved 3.5 cm    Wound/Ulcer Pain Timing/Severity: moderate  Quality of pain: aching, throbbing, shooting   Severity:  3 / 10   Modifying Factors: Pain worsens with dressing changes, pressure, debridement  Associated Signs/Symptoms: drainage and pain    Ulcer Identification:  Ulcer Type: arterial, diabetic, pressure and non-healing surgical  Contributing Factors: edema, diabetes, poor glucose control, chronic pressure, decreased mobility, shear force, obesity and arterial insufficiency    Diabetic/Pressure/Non Pressure Ulcers only:  Ulcer: Diabetic ulcer, fat layer exposed    Wound: Wound dehiscence        PAST MEDICAL HISTORY      Diagnosis Date    Acute kidney injury ([address] 5/18/2020    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 ([address] 5/22/2018    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 ([address] 5/21/2018    Cellulitis, scrotum 3/20/2020    Critical lower limb ischemia 3/20/2020    Diabetes mellitus ([address]     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 2 diabetes mellitus, with fat layer exposed ([address] 5/22/2018    Disruption of closure of muscle or muscle flap, sequela 8/26/2020    DVT, lower extremity (HCC)     right leg     Encounter for dental examination and cleaning with abnormal findings 4/2/2018    Gas gangrene of thigh ([address] 3/20/2020    History of DVT (deep vein thrombosis) 7/31/2018    Hyperglycemia due to type 2 diabetes mellitus ([address] 3/20/2020    Hyperlipidemia     Hypertension     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[address]     Osteomyelitis ([address] 10/24/2018    PVD (peripheral vascular disease) ([address]      Past Surgical History:   Procedure Laterality Date    AMPUTATION ABOVE KNEE Right 4/28/2020    DEBRIDEMENT OF AMPUTATION RIGHT ABOVE KNEE performed by Severa Hatch, MD at [facility] Right 4/30/2020    DEBRIDEMENT OF AMPUTATION RIGHT ABOVE KNEE,  POSS. ABOVE KNEE REVISION, POSS. CLOSURE, POSS.WOUND VAC -- DRS.  [address] / [address]  performed by Severa Hatch, MD at [place] [address] Right 3/20/2020 RIGHT LOWER EXTREMITY THROMBECTOMY, POSSIBLE ANGIOGRAM, POSSIBLE INTERVENTION, POSSIBLE BYPASS. performed by Valeria Berg MD at [address] Right 4/17/2020    RIGHT LEG DEBRIDEMENT INCISION AND DRAINAGE performed by Nadine Ross MD at [address] [place] [address] [address] Right 4/20/2020    RIGHT THIGH WOUND DRESSING CHANGE; DEBRIDEMENT, removal of muscle performed by Nadine Ross MD at [address] Right 4/21/2020    RIGHT THIGH WOUND DRESSING CHANGE POSSIBLE  DEBRIDEMENT - NEEDS BEN Gu / JANETTE TO SEE performed by Vish Ingram MD at [address] Right 4/23/2020    RIGHT THIGH WOUND DRESSING CHANGE and DEBRIDEMENT performed by Valeria Berg MD at [address] [address] Right 10/15/2020    DEBRIDEMENT RIGHT ABOVE KNEE AMPUTATION POSS. REVISION POSS. WOUND VAC performed by Valeria Berg MD at [address] [address] Right 12/17/2020    DEBRIDEMENT  RIGHT ABOVE KNEE AMPUTATION WITH POSS.  ADVANCED SKIN THERAPY performed by Valeria Berg MD at [address] Right 11/1/2018    AMPUTATION ABOVE KNEE RIGHT LEG performed by Valeria Berg MD at [facility] Right 5/24/2018    RIGHT FOOT INCISION AND DRAINAGE WITH PARTIAL BONE RESECTION performed by Colton Kwan DPM at [address] [place] [address] OFFICE/OUTPT VISIT,PROCEDURE ONLY Right 8/3/2018    INCISION AND DRAINAGE MULTIPLE AREAS RIGHT FOOT WITH DEBRIDEMENT SOFT TISSUE performed by Colton Kwan DPM at [facility] OR    RHINOPLASTY Right     leg      Family History   Problem Relation Age of Onset    Cancer Mother     Diabetes Father     Heart Failure Father     Hypertension Father     Cancer Sister      Social History     Tobacco Use    Smoking status: Current Every Day Smoker     Packs/day: 0.50     Years: 7.00     Pack years: 3.50     Types: Cigarettes    Smokeless tobacco: Never Used   Substance Use Topics    Alcohol use: No     Frequency: Never    Drug use: No     No Known Allergies  Current Outpatient Medications on File Prior to Encounter   Medication Sig Dispense Refill    oxyCODONE (OXYCONTIN) 10 MG extended release tablet Take 1 tablet by mouth every 12 hours as needed for Pain for up to 30 days. 60 each 0    oxyCODONE (ROXICODONE) 5 MG immediate release tablet Take 1 tablet by mouth every 4-6 hours as needed for Pain for up to 30 days. 180 tablet 0    ammonium lactate (LAC-HYDRIN) 12 % lotion Apply topically as needed. 1 Bottle 0    metoprolol tartrate (LOPRESSOR) 25 MG tablet Take 1 tablet by mouth 2 times daily 90 tablet 1    gabapentin (NEURONTIN) 800 MG tablet Take 1 tablet by mouth 4 times daily for 30 days. 120 tablet 5    hydrALAZINE (APRESOLINE) 25 MG tablet Take 1 tablet by mouth 3 times daily 90 tablet 3    amLODIPine (NORVASC) 10 MG tablet Take 1 tablet by mouth daily 90 tablet 1    chlorthalidone (HYGROTON) 25 MG tablet Take 1 tablet by mouth daily 30 tablet 3    cloNIDine (CATAPRES) 0.1 MG tablet TAKE ONE TABLET BY MOUTH THREE TIMES DAILY 270 tablet 3    cilostazol (PLETAL) 50 MG tablet Take 1 tablet by mouth 2 times daily 180 tablet 1    mineral oil-hydrophilic petrolatum (AQUAPHOR) ointment Apply topically twice daily dispense 14 ounce jar 396 g 5    mirtazapine (REMERON) 7.5 MG tablet Take 1 tablet by mouth nightly 90 tablet 1    DULoxetine (CYMBALTA) 60 MG extended release capsule Take 2 capsules by mouth every morning 180 capsule 1    blood glucose test strips (ONETOUCH ULTRA) strip 1 each by Other route 3 times daily As needed.  300 each 1    insulin lispro (HUMALOG) 100 UNIT/ML injection vial Inject 0-12 Units into the skin 3 times daily (with meals) 1 vial 3    Insulin Syringe-Needle U-100 (ULTICARE INSULIN SYRINGE) 31G X 5/16\" 0.3 ML MISC 1 each by Other route 5 times daily 100 each 3    glucose (GLUTOSE) 40 % GEL Take 37.5 mLs by mouth as needed (hypoglycemia) 45 g 1    bisacodyl (DULCOLAX) 5 MG EC tablet Take 1 tablet by mouth daily as needed for Constipation 30 tablet 0    docusate sodium (COLACE) 100 MG capsule Take 1 capsule by mouth 2 times daily (Patient taking differently: Take 100 mg by mouth 2 times daily as needed ) 50 capsule 0    Handicap Placard MISC by Does not apply route Patient cannot walk 200 ft without stopping to rest.    Expiration 10/21/2024 1 each 0     No current facility-administered medications on file prior to encounter.         REVIEW OF SYSTEMS See HPI    Objective:    BP (!) 170/98 Comment: didnt take b/p meds today  Pulse 96   Temp 97.5 °F (36.4 °C) (Temporal)   Resp 18   Wt Readings from Last 3 Encounters:   03/24/21 245 lb (111.1 kg)   02/24/21 245 lb (111.1 kg)   02/22/21 245 lb (111.1 kg)     PHYSICAL EXAM  CONSTITUTIONAL:   Awake, alert, cooperative   EYES:  lids and lashes normal   ENT: external ears and nose without lesions   NECK:  supple, symmetrical, trachea midline   SKIN:  Open wound Present    Assessment:     Problem List Items Addressed This Visit     Disruption of closure of muscle or muscle flap, sequela (Chronic)    Relevant Orders    Initiate Outpatient Wound Care Protocol    Ischemic ulcer of right thigh with fat layer exposed ([facility] - Primary    Relevant Orders    Initiate Outpatient Wound Care Protocol          Pre Debridement Measurements:  Are located in the [place]  Documentation Flow Sheet  Post Debridement Measurements:  Wound/Ulcer Descriptions are Pre Debridement except measurements:     Wound 12/17/20 Thigh Right;Lateral #1 (Active)   Wound Image   03/10/21 1117   Dressing Status New dressing applied 03/31/21 1206   Wound Cleansed Cleansed with saline 03/31/21 1206   Dressing/Treatment Alginate;Dry dressing 03/31/21 1206   Offloading for Diabetic Foot Ulcers No offloading required 03/24/21 1237   Wound Length (cm) 0.5 cm 03/31/21 1128   Wound Width (cm) 0.8 cm 03/31/21 1128   Wound Depth (cm) 4.7 cm 03/31/21 1128   Wound Surface Area (cm^2) 0.4 cm^2 03/31/21 1128   Change in Wound Size % (l*w) 94.89 03/31/21 1128   Wound Volume (cm^3) 1.88 cm^3 03/31/21 1128   Wound Healing % 93 03/31/21 1128   Post-Procedure Length (cm) 0.5 cm 03/31/21 1155   Post-Procedure Width (cm) 0.7 cm 03/31/21 1155   Post-Procedure Depth (cm) 3.5 cm 03/31/21 1155   Post-Procedure Surface Area (cm^2) 0.35 cm^2 03/31/21 1155   Post-Procedure Volume (cm^3) 1.23 cm^3 03/31/21 1155   Wound Assessment Pink/red 03/31/21 1128   Drainage Amount Large 03/31/21 1128   Drainage Description Serosanguinous 03/31/21 1128   Odor None 03/31/21 1128   Nadia-wound Assessment Intact 03/31/21 1128   Number of days: 103     Incision 04/28/20 Thigh Right;Medial (Active)   Number of days: 337   Procedure Note  Indications:  Based on my examination of this patient's wound(s)/ulcer(s) today, debridement is required to promote healing and evaluate the wound base. Performed by: Aldair Muro MD    Consent obtained:  Yes    Time out taken:  Yes    Pain Control: Anesthetic  Anesthetic: 4% Lidocaine Liquid Topical     Debridement:Excisional Debridement    Using curette the wound(s)/ulcer(s) was/were sharply debrided down through and including the removal of epidermis, dermis, subq,     Devitalized Tissue Debrided:  fibrin, biofilm, slough and exudate to stimulate bleeding to promote healing, post debridement good bleeding base and wound edges noted    Wound/Ulcer #: 1    Percent of Wound/Ulcer Debrided: 100%    Total Surface Area Debrided: 0.4 sq cm     Estimated Blood Loss:  Minimal  Hemostasis Achieved:  by pressure    Procedural Pain:  4  / 10   Post Procedural Pain:  3 / 10      Response to treatment:  Well tolerated    Plan:   Treatment Note please see attached Discharge Instructions    Written patient dismissal instructions given to patient and signed by patient or POA.          Discharge Instructions       Visit Discharge/Physician Orders     Discharge condition: Stable     Assessment of pain at discharge:yes     Anesthetic used: 4% lidocaine soln.     Discharge to: Home     Left via:Private automobile     Accompanied by: spouse     ECF/HHA: [facility]     Dressing Orders:RIGHT AKA SITE-Cleanse with normal saline, pack loosely with calcium alginate, dry dressing and secure. Change daily.     Treatment Orders:Eat a diet high in protein and vitamin C. Take a multiple vitamin daily unless contraindicated.      [address] [place] [address],[address] followup visit: 1 week _____________________________  (Please note your next appointment above and if you are unable to keep, kindly give a 24 hour notice.  Thank you.)     Physician signature:__________________________  Endy Gore  If you experience any of the following, please call the [facility] [address] during business hours:     * Increase in Pain  * Temperature over 101  * Increase in drainage from your wound  * Drainage with a foul odor  * Bleeding  * Increase in swelling  * Need for compression bandage changes due to slippage, breakthrough drainage.     If you need medical attention outside of the business hours of the [facility] [address] please contact your PCP or go to the nearest emergency room.                                                Electronically signed by Rosa Arana MD

## 2021-04-07 ENCOUNTER — HOSPITAL ENCOUNTER (OUTPATIENT)
Dept: WOUND CARE | Age: 66
Discharge: HOME | End: 2021-04-07
Payer: COMMERCIAL

## 2021-04-14 ENCOUNTER — HOSPITAL ENCOUNTER (OUTPATIENT)
Dept: WOUND CARE | Age: 66
Discharge: HOME | End: 2021-04-14
Payer: COMMERCIAL

## 2021-04-14 VITALS
DIASTOLIC BLOOD PRESSURE: 82 MMHG | SYSTOLIC BLOOD PRESSURE: 144 MMHG | TEMPERATURE: 99 F | RESPIRATION RATE: 20 BRPM | HEART RATE: 85 BPM

## 2021-04-14 DIAGNOSIS — L97.112: ICD-10-CM

## 2021-04-14 DIAGNOSIS — T81.32XS: Primary | ICD-10-CM

## 2021-04-14 DIAGNOSIS — G89.4 CHRONIC PAIN SYNDROME: ICD-10-CM

## 2021-04-14 DIAGNOSIS — S78.111A ABOVE KNEE AMPUTATION OF RIGHT LOWER EXTREMITY (HCC): ICD-10-CM

## 2021-04-14 PROCEDURE — 11042 DBRDMT SUBQ TIS 1ST 20SQCM/<: CPT

## 2021-04-14 PROCEDURE — 11042 DBRDMT SUBQ TIS 1ST 20SQCM/<: CPT | Performed by: SURGERY

## 2021-04-14 RX ORDER — BACITRACIN ZINC AND POLYMYXIN B SULFATE 500; 1000 [USP'U]/G; [USP'U]/G
OINTMENT TOPICAL ONCE
Status: CANCELLED | OUTPATIENT
Start: 2021-04-14 | End: 2021-04-14

## 2021-04-14 RX ORDER — LIDOCAINE HYDROCHLORIDE 20 MG/ML
JELLY TOPICAL ONCE
Status: CANCELLED | OUTPATIENT
Start: 2021-04-14 | End: 2021-04-14

## 2021-04-14 RX ORDER — BACITRACIN, NEOMYCIN, POLYMYXIN B 400; 3.5; 5 [USP'U]/G; MG/G; [USP'U]/G
OINTMENT TOPICAL ONCE
Status: CANCELLED | OUTPATIENT
Start: 2021-04-14 | End: 2021-04-14

## 2021-04-14 RX ORDER — GENTAMICIN SULFATE 1 MG/G
OINTMENT TOPICAL ONCE
Status: CANCELLED | OUTPATIENT
Start: 2021-04-14 | End: 2021-04-14

## 2021-04-14 RX ORDER — CLOBETASOL PROPIONATE 0.5 MG/G
OINTMENT TOPICAL ONCE
Status: CANCELLED | OUTPATIENT
Start: 2021-04-14 | End: 2021-04-14

## 2021-04-14 RX ORDER — GINSENG 100 MG
CAPSULE ORAL ONCE
Status: CANCELLED | OUTPATIENT
Start: 2021-04-14 | End: 2021-04-14

## 2021-04-14 RX ORDER — LIDOCAINE 50 MG/G
OINTMENT TOPICAL ONCE
Status: CANCELLED | OUTPATIENT
Start: 2021-04-14 | End: 2021-04-14

## 2021-04-14 RX ORDER — BETAMETHASONE DIPROPIONATE 0.05 %
OINTMENT (GRAM) TOPICAL ONCE
Status: CANCELLED | OUTPATIENT
Start: 2021-04-14 | End: 2021-04-14

## 2021-04-14 RX ORDER — LIDOCAINE HYDROCHLORIDE 40 MG/ML
SOLUTION TOPICAL ONCE
Status: COMPLETED | OUTPATIENT
Start: 2021-04-14 | End: 2021-04-14

## 2021-04-14 RX ORDER — LIDOCAINE 40 MG/G
CREAM TOPICAL ONCE
Status: CANCELLED | OUTPATIENT
Start: 2021-04-14 | End: 2021-04-14

## 2021-04-14 RX ORDER — LIDOCAINE HYDROCHLORIDE 40 MG/ML
SOLUTION TOPICAL ONCE
Status: CANCELLED | OUTPATIENT
Start: 2021-04-14 | End: 2021-04-14

## 2021-04-14 RX ADMIN — LIDOCAINE HYDROCHLORIDE: 40 SOLUTION TOPICAL at 11:17

## 2021-04-14 ASSESSMENT — PAIN DESCRIPTION - PAIN TYPE: TYPE: CHRONIC PAIN

## 2021-04-14 ASSESSMENT — PAIN DESCRIPTION - PROGRESSION: CLINICAL_PROGRESSION: NOT CHANGED

## 2021-04-14 ASSESSMENT — PAIN SCALES - GENERAL: PAINLEVEL_OUTOF10: 7

## 2021-04-14 ASSESSMENT — PAIN DESCRIPTION - ONSET: ONSET: ON-GOING

## 2021-04-14 ASSESSMENT — PAIN DESCRIPTION - LOCATION: LOCATION: LEG

## 2021-04-14 ASSESSMENT — PAIN - FUNCTIONAL ASSESSMENT: PAIN_FUNCTIONAL_ASSESSMENT: ACTIVITIES ARE NOT PREVENTED

## 2021-04-14 NOTE — PROGRESS NOTES
Wound Healing Center Followup Visit Note    Referring Physician : Nohelia Norris Str RECORD NUMBER:  [id number]  AGE: 63 y.o. GENDER: male  : 1955  EPISODE DATE:  2021    Subjective:     Chief Complaint   Patient presents with    Wound Check     rt leg       HISTORY of PRESENT ILLNESS HPI   Esdras Cadena is a 63 y.o. male who presents today in regards to follow up evaluation and treatment of wound/ulcer. That patient's past medical, family and social hx were reviewed and changes were made if present. History of Wound Context:  Pt presents in regards to chronic R above knee amputation wound was since 2020. He had developed postoperatively dehiscence of his wound site which had previously undergone a muscle flap. He was having a packed per home health care. The wound had gotten so small that it was difficult to pack. He is still having drainage significant though. At first visit to wound healing center 20 his wound has not been improving. He was not on abx at initial visit to center.     Previous lower extremity procedures  2018  R AKA for chronic R LE wound   3/20/2020 R groin exploration with iliofemoral artery embolectomy, primary closure of arteriotomy, R deep femoral atery embolectomy   2020 Excisional debridement of infected necrotic  tissue, right groin and right thigh   2020 RIGHT THIGH WOUND DRESSING CHANGE and DEBRIDEMENT   2020 Irrigation and excisional debridement of Right Thigh above knee amputation wound   2020 Irrigation and excisional debridement of the Right thigh above knee amputation wound 25 x45 cm  Excision of prosthetic R LE bypass graft  Ligation of right SFA  Posterior hamstring myocutaneous flap 65 cm flap for open wound of 45 x 25 cm per Dr. Mumtaz Pereira     20  · Wound itself is small - opened up as difficult to pack  · 5 cm depth - culture done, significant amount of fluid expressed once opened changes, pressure, debridement  Associated Signs/Symptoms: drainage and pain    Ulcer Identification:  Ulcer Type: arterial, diabetic, pressure and non-healing surgical  Contributing Factors: edema, diabetes, poor glucose control, chronic pressure, decreased mobility, shear force, obesity and arterial insufficiency    Diabetic/Pressure/Non Pressure Ulcers only:  Ulcer: Diabetic ulcer, fat layer exposed    Wound: Wound dehiscence        PAST MEDICAL HISTORY      Diagnosis Date    Acute kidney injury ([address] 5/18/2020    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 ([address] 5/22/2018    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 ([address] 5/21/2018    Cellulitis, scrotum 3/20/2020    Critical lower limb ischemia 3/20/2020    Diabetes mellitus ([address]     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 2 diabetes mellitus, with fat layer exposed ([address] 5/22/2018    Disruption of closure of muscle or muscle flap, sequela 8/26/2020    DVT, lower extremity ([address]     right leg     Encounter for dental examination and cleaning with abnormal findings 4/2/2018    Gas gangrene of thigh ([address] 3/20/2020    History of DVT (deep vein thrombosis) 7/31/2018    Hyperglycemia due to type 2 diabetes mellitus ([address] 3/20/2020    Hyperlipidemia     Hypertension     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[address]     Osteomyelitis ([address] 10/24/2018    PVD (peripheral vascular disease) ([address]      Past Surgical History:   Procedure Laterality Date    AMPUTATION ABOVE KNEE Right 4/28/2020    DEBRIDEMENT OF AMPUTATION RIGHT ABOVE KNEE performed by Severa Hatch, MD at [address] [facility] Right 4/30/2020    DEBRIDEMENT OF AMPUTATION RIGHT ABOVE KNEE,  POSS. ABOVE KNEE REVISION, POSS. CLOSURE, POSS.WOUND VAC -- DRS.  CASH / [address] performed by Severa Hatch, MD at [place] [address] Right 3/20/2020    RIGHT LOWER EXTREMITY THROMBECTOMY, POSSIBLE ANGIOGRAM, POSSIBLE INTERVENTION, POSSIBLE BYPASS. performed by Nadira Juárez MD at [address] [place] [address] Right 4/17/2020    RIGHT LEG DEBRIDEMENT INCISION AND DRAINAGE performed by Nataliya Gray MD at [address] [place] [address] Right 4/20/2020    RIGHT THIGH WOUND DRESSING CHANGE; DEBRIDEMENT, removal of muscle performed by Nataliya Gray MD at [address] [place] [address] Right 4/21/2020    RIGHT THIGH WOUND DRESSING CHANGE POSSIBLE  DEBRIDEMENT - NEEDS BEN Robles / JANETTE TO SEE performed by Bam Peguero MD at [address] [place] [address] Right 4/23/2020    RIGHT THIGH WOUND DRESSING CHANGE and DEBRIDEMENT performed by Nadira Juárez MD at [address] [place] [address] Right 10/15/2020    DEBRIDEMENT RIGHT ABOVE KNEE AMPUTATION POSS. REVISION POSS. WOUND VAC performed by Nadira Juárez MD at [address] [place] [address] Right 12/17/2020    DEBRIDEMENT  RIGHT ABOVE KNEE AMPUTATION WITH POSS.  ADVANCED SKIN THERAPY performed by Nadira Juárez MD at [address] [facility] Right 11/1/2018    AMPUTATION ABOVE KNEE RIGHT LEG performed by Nadira Juárez MD at [address] [facility] Right 5/24/2018    RIGHT FOOT INCISION AND DRAINAGE WITH PARTIAL BONE RESECTION performed by Nat Donahue DPM at [facility] OFFICE/OUTPT VISIT,PROCEDURE ONLY Right 8/3/2018    INCISION AND DRAINAGE MULTIPLE AREAS RIGHT FOOT WITH DEBRIDEMENT SOFT TISSUE performed by Nat Donahue DPM at [facility] OR    RHINOPLASTY Right     leg      Family History   Problem Relation Age of Onset    Cancer Mother     Diabetes Father     Heart Failure Father     Hypertension Father     Cancer Sister      Social History     Tobacco Use    Smoking status: Current Every Day Smoker     Packs/day: 0.50     Years: 7.00     Pack years: 3.50     Types: Cigarettes    Smokeless tobacco: Never Used   Substance Use 45 g 1    bisacodyl (DULCOLAX) 5 MG EC tablet Take 1 tablet by mouth daily as needed for Constipation 30 tablet 0    docusate sodium (COLACE) 100 MG capsule Take 1 capsule by mouth 2 times daily (Patient taking differently: Take 100 mg by mouth 2 times daily as needed ) 50 capsule 0    Handicap Placard MISC by Does not apply route Patient cannot walk 200 ft without stopping to rest.    Expiration 10/21/2024 1 each 0     No current facility-administered medications on file prior to encounter.         REVIEW OF SYSTEMS See HPI    Objective:    BP (!) 144/82   Pulse 85   Temp 99 °F (37.2 °C) (Temporal)   Resp 20   Wt Readings from Last 3 Encounters:   03/24/21 245 lb (111.1 kg)   02/24/21 245 lb (111.1 kg)   02/22/21 245 lb (111.1 kg)     PHYSICAL EXAM  CONSTITUTIONAL:   Awake, alert, cooperative   EYES:  lids and lashes normal   ENT: external ears and nose without lesions   NECK:  supple, symmetrical, trachea midline   SKIN:  Open wound Present    Assessment:     Problem List Items Addressed This Visit     Disruption of closure of muscle or muscle flap, sequela - Primary (Chronic)    Relevant Orders    Initiate Outpatient Wound Care Protocol    Ischemic ulcer of right thigh with fat layer exposed [facility])    Relevant Orders    Initiate Outpatient Wound Care Protocol          Pre Debridement Measurements:  Are located in the [place]  Documentation Flow Sheet  Post Debridement Measurements:  Wound/Ulcer Descriptions are Pre Debridement except measurements:     Wound 12/17/20 Thigh Right;Lateral #1 (Active)   Wound Image   03/10/21 1117   Dressing Status New dressing applied 03/31/21 1206   Wound Cleansed Cleansed with saline 03/31/21 1206   Dressing/Treatment Alginate;Dry dressing 04/14/21 1150   Offloading for Diabetic Foot Ulcers No offloading required 03/24/21 1237   Wound Length (cm) 0.6 cm 04/14/21 1121   Wound Width (cm) 0.4 cm 04/14/21 1121   Wound Depth (cm) 3.3 cm 04/14/21 1121   Wound Surface Area (cm^2) 0.24 cm^2 04/14/21 1121   Change in Wound Size % (l*w) 96.93 04/14/21 1121   Wound Volume (cm^3) 0.79 cm^3 04/14/21 1121   Wound Healing % 97 04/14/21 1121   Post-Procedure Length (cm) 0.7 cm 04/14/21 1150   Post-Procedure Width (cm) 0.6 cm 04/14/21 1150   Post-Procedure Depth (cm) 3.4 cm 04/14/21 1150   Post-Procedure Surface Area (cm^2) 0.42 cm^2 04/14/21 1150   Post-Procedure Volume (cm^3) 1.43 cm^3 04/14/21 1150   Wound Assessment Pink/red 04/14/21 1121   Drainage Amount Large 04/14/21 1121   Drainage Description Serosanguinous 04/14/21 1121   Odor None 04/14/21 1121   Nadia-wound Assessment Intact 04/14/21 1121   Number of days: 117     Incision 04/28/20 Thigh Right;Medial (Active)   Number of days: 351   Procedure Note  Indications:  Based on my examination of this patient's wound(s)/ulcer(s) today, debridement is required to promote healing and evaluate the wound base. Performed by: Elisa Vasquez MD    Consent obtained:  Yes    Time out taken:  Yes    Pain Control: Anesthetic  Anesthetic: 4% Lidocaine Liquid Topical     Debridement:Excisional Debridement    Using curette the wound(s)/ulcer(s) was/were sharply debrided down through and including the removal of epidermis, dermis, subq,     Devitalized Tissue Debrided:  fibrin, biofilm, slough and exudate to stimulate bleeding to promote healing, post debridement good bleeding base and wound edges noted    Wound/Ulcer #: 1    Percent of Wound/Ulcer Debrided: 100%    Total Surface Area Debrided: 0.24 sq cm     Estimated Blood Loss:  Minimal  Hemostasis Achieved:  by pressure    Procedural Pain:  4  / 10   Post Procedural Pain:  3 / 10      Response to treatment:  Well tolerated    Plan:   Treatment Note please see attached Discharge Instructions    Written patient dismissal instructions given to patient and signed by patient or POA.          Discharge Instructions       Visit Discharge/Physician Orders     Discharge condition: Stable     Assessment of pain at discharge:yes     Anesthetic used: 4% lidocaine soln.     Discharge to: Home     Left via:Private automobile     Accompanied by: spouse     ECF/HHA: [facility]     Dressing Orders:RIGHT AKA SITE-Cleanse with normal saline, pack loosely with calcium alginate, dry dressing and secure. Change daily.     Treatment Orders:Eat a diet high in protein and vitamin C. Take a multiple vitamin daily unless contraindicated.      [facility] followup visit: 1 week _____________________________  (Please note your next appointment above and if you are unable to keep, kindly give a 24 hour notice.  Thank you.)     Physician signature:__________________________  Jomarie Rubinstein  If you experience any of the following, please call the [email or web address] during business hours:     * Increase in Pain  * Temperature over 101  * Increase in drainage from your wound  * Drainage with a foul odor  * Bleeding  * Increase in swelling  * Need for compression bandage changes due to slippage, breakthrough drainage.     If you need medical attention outside of the business hours of the [facility] please contact your PCP or go to the nearest emergency room.                                                            Electronically signed by Esdras Rapp MD

## 2021-04-14 NOTE — PLAN OF CARE
Problem: Pain:  Goal: Pain level will decrease  Description: Pain level will decrease  Outcome: Ongoing  Goal: Control of acute pain  Description: Control of acute pain  Outcome: Ongoing  Goal: Control of chronic pain  Description: Control of chronic pain  Outcome: Ongoing     Problem: Wound:  Goal: Will show signs of wound healing; wound closure and no evidence of infection  Description: Will show signs of wound healing; wound closure and no evidence of infection  Outcome: Ongoing     Problem: Smoking cessation:  Goal: Ability to formulate a plan to maintain a tobacco-free life will be supported  Description: Ability to formulate a plan to maintain a tobacco-free life will be supported  Outcome: Ongoing     Problem: Weight control:  Goal: Ability to maintain an optimal weight for height and age will be supported  Description: Ability to maintain an optimal weight for height and age will be supported  Outcome: Met This Shift     Problem: Falls - Risk of:  Goal: Will remain free from falls  Description: Will remain free from falls  Outcome: Met This Shift

## 2021-04-14 NOTE — TELEPHONE ENCOUNTER
Name of Medication(s) Requested:  Oxycodone 5mg 4-6 hours prn    Pharmacy Requested:   [facility]    Medication(s) pended? [x] Yes  [] No    Last Appointment:  2/22/2021    Future appts:  Future Appointments   Date Time Provider Jason Fam   4/21/2021 10:45 AM [facility]   5/24/2021  1:30 PM DO SANTANA Russell  [facility]        Does patient need call back?   [] Yes  [x] No

## 2021-04-15 RX ORDER — OXYCODONE HYDROCHLORIDE 5 MG/1
5 TABLET ORAL
Qty: 180 TABLET | Refills: 0 | Status: ON HOLD
Start: 2021-04-15 | End: 2021-05-20 | Stop reason: HOSPADM

## 2021-04-21 ENCOUNTER — HOSPITAL ENCOUNTER (OUTPATIENT)
Dept: WOUND CARE | Age: 66
Discharge: HOME | End: 2021-04-21
Payer: COMMERCIAL

## 2021-04-21 VITALS
TEMPERATURE: 98.2 F | HEART RATE: 88 BPM | SYSTOLIC BLOOD PRESSURE: 160 MMHG | RESPIRATION RATE: 18 BRPM | DIASTOLIC BLOOD PRESSURE: 80 MMHG

## 2021-04-21 DIAGNOSIS — T81.32XS: Primary | ICD-10-CM

## 2021-04-21 DIAGNOSIS — L97.112: ICD-10-CM

## 2021-04-21 PROCEDURE — 11042 DBRDMT SUBQ TIS 1ST 20SQCM/<: CPT

## 2021-04-21 PROCEDURE — 11042 DBRDMT SUBQ TIS 1ST 20SQCM/<: CPT | Performed by: NURSE PRACTITIONER

## 2021-04-21 RX ORDER — LIDOCAINE HYDROCHLORIDE 20 MG/ML
JELLY TOPICAL ONCE
Status: CANCELLED | OUTPATIENT
Start: 2021-04-21 | End: 2021-04-21

## 2021-04-21 RX ORDER — BACITRACIN ZINC AND POLYMYXIN B SULFATE 500; 1000 [USP'U]/G; [USP'U]/G
OINTMENT TOPICAL ONCE
Status: CANCELLED | OUTPATIENT
Start: 2021-04-21 | End: 2021-04-21

## 2021-04-21 RX ORDER — LIDOCAINE 40 MG/G
CREAM TOPICAL ONCE
Status: CANCELLED | OUTPATIENT
Start: 2021-04-21 | End: 2021-04-21

## 2021-04-21 RX ORDER — CLOBETASOL PROPIONATE 0.5 MG/G
OINTMENT TOPICAL ONCE
Status: CANCELLED | OUTPATIENT
Start: 2021-04-21 | End: 2021-04-21

## 2021-04-21 RX ORDER — BACITRACIN, NEOMYCIN, POLYMYXIN B 400; 3.5; 5 [USP'U]/G; MG/G; [USP'U]/G
OINTMENT TOPICAL ONCE
Status: CANCELLED | OUTPATIENT
Start: 2021-04-21 | End: 2021-04-21

## 2021-04-21 RX ORDER — LIDOCAINE 50 MG/G
OINTMENT TOPICAL ONCE
Status: CANCELLED | OUTPATIENT
Start: 2021-04-21 | End: 2021-04-21

## 2021-04-21 RX ORDER — LIDOCAINE HYDROCHLORIDE 40 MG/ML
SOLUTION TOPICAL ONCE
Status: COMPLETED | OUTPATIENT
Start: 2021-04-21 | End: 2021-04-21

## 2021-04-21 RX ORDER — BETAMETHASONE DIPROPIONATE 0.05 %
OINTMENT (GRAM) TOPICAL ONCE
Status: CANCELLED | OUTPATIENT
Start: 2021-04-21 | End: 2021-04-21

## 2021-04-21 RX ORDER — GINSENG 100 MG
CAPSULE ORAL ONCE
Status: CANCELLED | OUTPATIENT
Start: 2021-04-21 | End: 2021-04-21

## 2021-04-21 RX ORDER — GENTAMICIN SULFATE 1 MG/G
OINTMENT TOPICAL ONCE
Status: CANCELLED | OUTPATIENT
Start: 2021-04-21 | End: 2021-04-21

## 2021-04-21 RX ORDER — LIDOCAINE HYDROCHLORIDE 40 MG/ML
SOLUTION TOPICAL ONCE
Status: CANCELLED | OUTPATIENT
Start: 2021-04-21 | End: 2021-04-21

## 2021-04-21 RX ADMIN — LIDOCAINE HYDROCHLORIDE: 40 SOLUTION TOPICAL at 11:06

## 2021-04-21 ASSESSMENT — PAIN DESCRIPTION - LOCATION: LOCATION: LEG

## 2021-04-21 ASSESSMENT — PAIN DESCRIPTION - ORIENTATION: ORIENTATION: RIGHT

## 2021-04-21 ASSESSMENT — PAIN - FUNCTIONAL ASSESSMENT: PAIN_FUNCTIONAL_ASSESSMENT: ACTIVITIES ARE NOT PREVENTED

## 2021-04-21 ASSESSMENT — PAIN DESCRIPTION - ONSET: ONSET: ON-GOING

## 2021-04-21 ASSESSMENT — PAIN DESCRIPTION - PROGRESSION: CLINICAL_PROGRESSION: NOT CHANGED

## 2021-04-21 ASSESSMENT — PAIN SCALES - GENERAL: PAINLEVEL_OUTOF10: 7

## 2021-04-21 NOTE — PROGRESS NOTES
Wound Healing Center Followup Visit Note    Referring Physician : Nohelia Norris Str RECORD NUMBER:  [id number]  AGE: 63 y.o. GENDER: male  : 1955  EPISODE DATE:  2021    Subjective:     Chief Complaint   Patient presents with    Wound Check     rt leg       HISTORY of PRESENT ILLNESS HPI   Junito Joyce is a 63 y.o. male who presents today in regards to follow up evaluation and treatment of wound/ulcer. That patient's past medical, family and social hx were reviewed and changes were made if present. History of Wound Context:  Pt presents in regards to chronic R above knee amputation wound was since 2020. He had developed postoperatively dehiscence of his wound site which had previously undergone a muscle flap. He was having a packed per home health care. The wound had gotten so small that it was difficult to pack. He is still having drainage significant though. At first visit to wound healing center 20 his wound has not been improving. He was not on abx at initial visit to center.     Previous lower extremity procedures  2018  R AKA for chronic R LE wound   3/20/2020 R groin exploration with iliofemoral artery embolectomy, primary closure of arteriotomy, R deep femoral atery embolectomy   2020 Excisional debridement of infected necrotic  tissue, right groin and right thigh   2020 RIGHT THIGH WOUND DRESSING CHANGE and DEBRIDEMENT   2020 Irrigation and excisional debridement of Right Thigh above knee amputation wound   2020 Irrigation and excisional debridement of the Right thigh above knee amputation wound 25 x45 cm  Excision of prosthetic R LE bypass graft  Ligation of right SFA  Posterior hamstring myocutaneous flap 65 cm flap for open wound of 45 x 25 cm per Dr. Miroslava Roper     20  · Wound itself is small - opened up as difficult to pack  · 5 cm depth - culture done, significant amount of fluid expressed once opened up  · aquacell ag packing  · Discussed potential need for further surgical exploration in future - pt would like to avoid hospital and OR if possible  9/2/20  · Wound much improved - depth less  · Start doxycyline for staph culture  9/9/20  · Depth 5 cm again, fluid seems less  9/23/20  · Tunneling now 8 cm, concern that hittig bone  · Discussed with patient continuing current reigmen unlikely to result in healing  · Would benefit from further surgical debridement, likely revision of amputation site and possible wound vac  · Patient would like to consider options and will let me know next week  9/30/20  · Significant tunneling still  · Will proceed with or in 2 weeks per pt request  10/21/20  · OR 10/14/20  · dakins moistened kerlex  · Discussed with Dr. Joseline Madrid re pain control issues  · Doxycycline 10 days script given  10/28/20  · Pain better controlled  · + Granulation tissue  · Bone less exposed  11/4/20  · Wound health  · Bone still exposed  11/18/20  · Wound continues to be improved  · Less bone exposed  11/25/20  · Bone exposure still an issue  · Will take for surgery - try dermagraft  · If not successful will likely need revision in future  12/9/20  · Bone exposure, wound overall improvd  · Patient did not follow though with testing for surgery  12/17/20  · dermagraft application  85/40/31  · Wound improved, bone still exposed  1/6/21  · Wound improved, bone still exposed  1/13/21  · Bone almost completely covered  1/27/21  · Bone covered, wound improved  2/10/21  · wound improved,depth less  2/24/21  · Wound stable - 3.5 cm depth  3/10/21  · Wound stable 4 cm depth  3/24/21  · Wound stable, same depth not improving - discussed with patient concerning and may need further debridement  3/31/21  · Depth improved 3.5 cm  4/14/21  · Depth improved 3 cm   4/21/21  · Depth improved 2.8 cm    Wound/Ulcer Pain Timing/Severity: moderate  Quality of pain: aching, throbbing, shooting   Severity:  3 / 10   Modifying Factors: Pain worsens with dressing changes, pressure, debridement  Associated Signs/Symptoms: drainage and pain    Ulcer Identification:  Ulcer Type: arterial, diabetic, pressure and non-healing surgical  Contributing Factors: edema, diabetes, poor glucose control, chronic pressure, decreased mobility, shear force, obesity and arterial insufficiency    Diabetic/Pressure/Non Pressure Ulcers only:  Ulcer: Diabetic ulcer, fat layer exposed    Wound: Wound dehiscence        PAST MEDICAL HISTORY      Diagnosis Date    Acute kidney injury ([address] 5/18/2020    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 ([address] 5/22/2018    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 ([address] 5/21/2018    Cellulitis, scrotum 3/20/2020    Critical lower limb ischemia 3/20/2020    Diabetes mellitus ([address]     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 2 diabetes mellitus, with fat layer exposed ([address] 5/22/2018    Disruption of closure of muscle or muscle flap, sequela 8/26/2020    DVT, lower extremity ([address]     right leg     Encounter for dental examination and cleaning with abnormal findings 4/2/2018    Gas gangrene of thigh ([address] 3/20/2020    History of DVT (deep vein thrombosis) 7/31/2018    Hyperglycemia due to type 2 diabetes mellitus ([address] 3/20/2020    Hyperlipidemia     Hypertension     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[address]     Osteomyelitis ([address] 10/24/2018    PVD (peripheral vascular disease) ([address]      Past Surgical History:   Procedure Laterality Date    AMPUTATION ABOVE KNEE Right 4/28/2020    DEBRIDEMENT OF AMPUTATION RIGHT ABOVE KNEE performed by Declan Martinez MD at [address] [facility] Right 4/30/2020    DEBRIDEMENT OF AMPUTATION RIGHT ABOVE KNEE,  POSS. ABOVE KNEE REVISION, POSS. CLOSURE, POSS.WOUND VAC -- DRS.  [address] [facility] / [address] [facility] performed by Declan Martinez MD at [facility]      Right - [address] Right 3/20/2020    RIGHT LOWER EXTREMITY THROMBECTOMY, POSSIBLE ANGIOGRAM, POSSIBLE INTERVENTION, POSSIBLE BYPASS. performed by Esdras Rapp MD at [address] [address] Right 4/17/2020    RIGHT LEG DEBRIDEMENT INCISION AND DRAINAGE performed by Joel Baltazar MD at [address] [address] Right 4/20/2020    RIGHT THIGH WOUND DRESSING CHANGE; DEBRIDEMENT, removal of muscle performed by Joel Baltazar MD at [address] [address] Right 4/21/2020    RIGHT THIGH WOUND DRESSING CHANGE POSSIBLE  DEBRIDEMENT - NEEDS BEN Ivan / JANETTE TO SEE performed by Darlin Oates MD at [address] [address] Right 4/23/2020    RIGHT THIGH WOUND DRESSING CHANGE and DEBRIDEMENT performed by Esdras Rapp MD at [address] [address] Right 10/15/2020    DEBRIDEMENT RIGHT ABOVE KNEE AMPUTATION POSS. REVISION POSS. WOUND VAC performed by Esdras Rapp MD at [address] [address] Right 12/17/2020    DEBRIDEMENT  RIGHT ABOVE KNEE AMPUTATION WITH POSS.  ADVANCED SKIN THERAPY performed by Esdras Rapp MD at [address] [facility] Right 11/1/2018    AMPUTATION ABOVE KNEE RIGHT LEG performed by Esdras Rapp MD at [facility] Right 5/24/2018    RIGHT FOOT INCISION AND DRAINAGE WITH PARTIAL BONE RESECTION performed by Hermes Tony DPM at [address] [place] [address] OFFICE/OUTPT VISIT,PROCEDURE ONLY Right 8/3/2018    INCISION AND DRAINAGE MULTIPLE AREAS RIGHT FOOT WITH DEBRIDEMENT SOFT TISSUE performed by Hermes Tony DPM at [facility] OR    RHINOPLASTY Right     leg      Family History   Problem Relation Age of Onset    Cancer Mother     Diabetes Father     Heart Failure Father     Hypertension Father     Cancer Sister      Social History     Tobacco Use    Smoking status: Current Every Day Smoker     Packs/day: 0.50     Years: 7.00     Pack years: 3.50     Types: Cigarettes    Smokeless tobacco: Never Used   Substance Use Topics    Alcohol use: No     Frequency: Never    Drug use: No     No Known Allergies  Current Outpatient Medications on File Prior to Encounter   Medication Sig Dispense Refill    oxyCODONE (ROXICODONE) 5 MG immediate release tablet Take 1 tablet by mouth every 4-6 hours as needed for Pain for up to 30 days. 180 tablet 0    insulin lispro (HUMALOG) 100 UNIT/ML injection vial INJECT 0-12 UNITS INTO THE SKIN THREE TIMES A DAY WITH MEALS 1 vial 3    oxyCODONE (OXYCONTIN) 10 MG extended release tablet Take 1 tablet by mouth every 12 hours as needed for Pain for up to 30 days. 60 each 0    ammonium lactate (LAC-HYDRIN) 12 % lotion Apply topically as needed. 1 Bottle 0    metoprolol tartrate (LOPRESSOR) 25 MG tablet Take 1 tablet by mouth 2 times daily 90 tablet 1    gabapentin (NEURONTIN) 800 MG tablet Take 1 tablet by mouth 4 times daily for 30 days. 120 tablet 5    hydrALAZINE (APRESOLINE) 25 MG tablet Take 1 tablet by mouth 3 times daily 90 tablet 3    amLODIPine (NORVASC) 10 MG tablet Take 1 tablet by mouth daily 90 tablet 1    chlorthalidone (HYGROTON) 25 MG tablet Take 1 tablet by mouth daily 30 tablet 3    cloNIDine (CATAPRES) 0.1 MG tablet TAKE ONE TABLET BY MOUTH THREE TIMES DAILY 270 tablet 3    cilostazol (PLETAL) 50 MG tablet Take 1 tablet by mouth 2 times daily 180 tablet 1    mineral oil-hydrophilic petrolatum (AQUAPHOR) ointment Apply topically twice daily dispense 14 ounce jar 396 g 5    mirtazapine (REMERON) 7.5 MG tablet Take 1 tablet by mouth nightly 90 tablet 1    DULoxetine (CYMBALTA) 60 MG extended release capsule Take 2 capsules by mouth every morning 180 capsule 1    blood glucose test strips (ONETOUCH ULTRA) strip 1 each by Other route 3 times daily As needed.  300 each 1    Insulin Syringe-Needle U-100 (ULTICARE INSULIN SYRINGE) 31G X 5/16\" 0.3 ML MISC 1 each by Other route 5 times daily 100 each 3    bisacodyl (DULCOLAX) 5 MG EC 3.9 cm 04/21/21 1059   Wound Surface Area (cm^2) 0.24 cm^2 04/21/21 1059   Change in Wound Size % (l*w) 96.93 04/21/21 1059   Wound Volume (cm^3) 0.94 cm^3 04/21/21 1059   Wound Healing % 97 04/21/21 1059   Post-Procedure Length (cm) 0.5 cm 04/21/21 1131   Post-Procedure Width (cm) 0.7 cm 04/21/21 1131   Post-Procedure Depth (cm) 3 cm 04/21/21 1131   Post-Procedure Surface Area (cm^2) 0.35 cm^2 04/21/21 1131   Post-Procedure Volume (cm^3) 1.05 cm^3 04/21/21 1131   Wound Assessment Pink/red 04/21/21 1059   Drainage Amount Moderate 04/21/21 1059   Drainage Description Serosanguinous 04/21/21 1059   Odor None 04/21/21 1059   Nadia-wound Assessment Intact 04/21/21 1059   Number of days: 124     Incision 04/28/20 Thigh Right;Medial (Active)   Number of days: 358   Procedure Note  Indications:  Based on my examination of this patient's wound(s)/ulcer(s) today, debridement is required to promote healing and evaluate the wound base. Performed by: SHONDA Healy CNP    Consent obtained:  Yes    Time out taken:  Yes    Pain Control: Anesthetic  Anesthetic: 4% Lidocaine Liquid Topical     Debridement:Excisional Debridement    Using curette the wound(s)/ulcer(s) was/were sharply debrided down through and including the removal of epidermis, dermis, subq,     Devitalized Tissue Debrided:  fibrin, biofilm, slough and exudate to stimulate bleeding to promote healing, post debridement good bleeding base and wound edges noted    Wound/Ulcer #: 1    Percent of Wound/Ulcer Debrided: 100%    Total Surface Area Debrided: 0.24 sq cm     Estimated Blood Loss:  Minimal  Hemostasis Achieved:  by pressure    Procedural Pain:  4  / 10   Post Procedural Pain:  3 / 10      Response to treatment:  Well tolerated    Plan:   Treatment Note please see attached Discharge Instructions    Written patient dismissal instructions given to patient and signed by patient or POA.          Discharge Instructions       Visit Discharge/Physician

## 2021-04-23 NOTE — TELEPHONE ENCOUNTER
Patient calling for a new script for Humalog. He stated the current sliding scale is not working. He has been using 15 units three times a day. Does patient need a new sliding scale? Also needs a refill on oxycodone er 10mg bid prn. Not in chart to pend.

## 2021-04-26 DIAGNOSIS — S78.111A ABOVE KNEE AMPUTATION OF RIGHT LOWER EXTREMITY (HCC): ICD-10-CM

## 2021-04-26 DIAGNOSIS — G89.4 CHRONIC PAIN SYNDROME: ICD-10-CM

## 2021-04-26 RX ORDER — OXYCODONE HCL 10 MG/1
10 TABLET, FILM COATED, EXTENDED RELEASE ORAL EVERY 12 HOURS PRN
Qty: 60 EACH | Refills: 0 | Status: SHIPPED | OUTPATIENT
Start: 2021-04-26 | End: 2021-05-26

## 2021-04-28 ENCOUNTER — HOSPITAL ENCOUNTER (OUTPATIENT)
Dept: WOUND CARE | Age: 66
Discharge: HOME | End: 2021-04-28
Payer: COMMERCIAL

## 2021-04-28 VITALS
RESPIRATION RATE: 19 BRPM | TEMPERATURE: 98 F | HEIGHT: 74 IN | WEIGHT: 245 LBS | BODY MASS INDEX: 31.44 KG/M2 | HEART RATE: 88 BPM | SYSTOLIC BLOOD PRESSURE: 138 MMHG | DIASTOLIC BLOOD PRESSURE: 72 MMHG

## 2021-04-28 DIAGNOSIS — L97.112: ICD-10-CM

## 2021-04-28 DIAGNOSIS — T81.32XS: Primary | ICD-10-CM

## 2021-04-28 PROCEDURE — 11042 DBRDMT SUBQ TIS 1ST 20SQCM/<: CPT | Performed by: SURGERY

## 2021-04-28 PROCEDURE — 6370000000 HC RX 637 (ALT 250 FOR IP): Performed by: SURGERY

## 2021-04-28 PROCEDURE — 11042 DBRDMT SUBQ TIS 1ST 20SQCM/<: CPT

## 2021-04-28 RX ORDER — LIDOCAINE HYDROCHLORIDE 20 MG/ML
JELLY TOPICAL ONCE
Status: CANCELLED | OUTPATIENT
Start: 2021-04-28 | End: 2021-04-28

## 2021-04-28 RX ORDER — GINSENG 100 MG
CAPSULE ORAL ONCE
Status: CANCELLED | OUTPATIENT
Start: 2021-04-28 | End: 2021-04-28

## 2021-04-28 RX ORDER — LIDOCAINE 50 MG/G
OINTMENT TOPICAL ONCE
Status: CANCELLED | OUTPATIENT
Start: 2021-04-28 | End: 2021-04-28

## 2021-04-28 RX ORDER — BACITRACIN ZINC AND POLYMYXIN B SULFATE 500; 1000 [USP'U]/G; [USP'U]/G
OINTMENT TOPICAL ONCE
Status: CANCELLED | OUTPATIENT
Start: 2021-04-28 | End: 2021-04-28

## 2021-04-28 RX ORDER — LIDOCAINE HYDROCHLORIDE 40 MG/ML
SOLUTION TOPICAL ONCE
Status: CANCELLED | OUTPATIENT
Start: 2021-04-28 | End: 2021-04-28

## 2021-04-28 RX ORDER — BETAMETHASONE DIPROPIONATE 0.05 %
OINTMENT (GRAM) TOPICAL ONCE
Status: CANCELLED | OUTPATIENT
Start: 2021-04-28 | End: 2021-04-28

## 2021-04-28 RX ORDER — LIDOCAINE 40 MG/G
CREAM TOPICAL ONCE
Status: CANCELLED | OUTPATIENT
Start: 2021-04-28 | End: 2021-04-28

## 2021-04-28 RX ORDER — BACITRACIN, NEOMYCIN, POLYMYXIN B 400; 3.5; 5 [USP'U]/G; MG/G; [USP'U]/G
OINTMENT TOPICAL ONCE
Status: CANCELLED | OUTPATIENT
Start: 2021-04-28 | End: 2021-04-28

## 2021-04-28 RX ORDER — CLOBETASOL PROPIONATE 0.5 MG/G
OINTMENT TOPICAL ONCE
Status: CANCELLED | OUTPATIENT
Start: 2021-04-28 | End: 2021-04-28

## 2021-04-28 RX ORDER — LIDOCAINE HYDROCHLORIDE 40 MG/ML
SOLUTION TOPICAL ONCE
Status: COMPLETED | OUTPATIENT
Start: 2021-04-28 | End: 2021-04-28

## 2021-04-28 RX ORDER — GENTAMICIN SULFATE 1 MG/G
OINTMENT TOPICAL ONCE
Status: CANCELLED | OUTPATIENT
Start: 2021-04-28 | End: 2021-04-28

## 2021-04-28 RX ADMIN — LIDOCAINE HYDROCHLORIDE 4 ML: 40 SOLUTION TOPICAL at 11:34

## 2021-04-28 ASSESSMENT — PAIN DESCRIPTION - PROGRESSION: CLINICAL_PROGRESSION: NOT CHANGED

## 2021-04-28 ASSESSMENT — PAIN - FUNCTIONAL ASSESSMENT: PAIN_FUNCTIONAL_ASSESSMENT: ACTIVITIES ARE NOT PREVENTED

## 2021-04-28 ASSESSMENT — PAIN DESCRIPTION - LOCATION: LOCATION: LEG

## 2021-04-29 DIAGNOSIS — I10 HYPERTENSION, UNSPECIFIED TYPE: ICD-10-CM

## 2021-04-29 RX ORDER — CHLORTHALIDONE 25 MG/1
TABLET ORAL
Qty: 30 TABLET | Refills: 3 | Status: SHIPPED
Start: 2021-04-29 | End: 2021-08-24 | Stop reason: SDUPTHER

## 2021-04-29 RX ORDER — MIRTAZAPINE 7.5 MG/1
TABLET, FILM COATED ORAL
Qty: 90 TABLET | Refills: 1 | Status: SHIPPED
Start: 2021-04-29 | End: 2021-09-02

## 2021-04-29 NOTE — PROGRESS NOTES
Wound Healing Center Followup Visit Note    Referring Physician : Nohelia Norris Str RECORD NUMBER:  [id number]  AGE: 63 y.o. GENDER: male  : 1955  EPISODE DATE:  2021    Subjective:     Chief Complaint   Patient presents with    Wound Check     Right AKA site       HISTORY of PRESENT ILLNESS HPI   Lulú Prince is a 63 y.o. male who presents today in regards to follow up evaluation and treatment of wound/ulcer. That patient's past medical, family and social hx were reviewed and changes were made if present. History of Wound Context:  Pt presents in regards to chronic R above knee amputation wound was since 2020. He had developed postoperatively dehiscence of his wound site which had previously undergone a muscle flap. He was having a packed per home health care. The wound had gotten so small that it was difficult to pack. He is still having drainage significant though. At first visit to wound healing center 20 his wound has not been improving. He was not on abx at initial visit to center.     Previous lower extremity procedures  2018  R AKA for chronic R LE wound   3/20/2020 R groin exploration with iliofemoral artery embolectomy, primary closure of arteriotomy, R deep femoral atery embolectomy   2020 Excisional debridement of infected necrotic  tissue, right groin and right thigh   2020 RIGHT THIGH WOUND DRESSING CHANGE and DEBRIDEMENT   2020 Irrigation and excisional debridement of Right Thigh above knee amputation wound   2020 Irrigation and excisional debridement of the Right thigh above knee amputation wound 25 x45 cm  Excision of prosthetic R LE bypass graft  Ligation of right SFA  Posterior hamstring myocutaneous flap 65 cm flap for open wound of 45 x 25 cm per Dr. Satinder Contreras     20  · Wound itself is small - opened up as difficult to pack  · 5 cm depth - culture done, significant amount of fluid expressed once opened up  · aquacell ag packing  · Discussed potential need for further surgical exploration in future - pt would like to avoid hospital and OR if possible  9/2/20  · Wound much improved - depth less  · Start doxycyline for staph culture  9/9/20  · Depth 5 cm again, fluid seems less  9/23/20  · Tunneling now 8 cm, concern that hittig bone  · Discussed with patient continuing current reigmen unlikely to result in healing  · Would benefit from further surgical debridement, likely revision of amputation site and possible wound vac  · Patient would like to consider options and will let me know next week  9/30/20  · Significant tunneling still  · Will proceed with or in 2 weeks per pt request  10/21/20  · OR 10/14/20  · dakins moistened kerlex  · Discussed with Dr. Fabián Vilchis re pain control issues  · Doxycycline 10 days script given  10/28/20  · Pain better controlled  · + Granulation tissue  · Bone less exposed  11/4/20  · Wound health  · Bone still exposed  11/18/20  · Wound continues to be improved  · Less bone exposed  11/25/20  · Bone exposure still an issue  · Will take for surgery - try dermagraft  · If not successful will likely need revision in future  12/9/20  · Bone exposure, wound overall improvd  · Patient did not follow though with testing for surgery  12/17/20  · dermagraft application  00/24/54  · Wound improved, bone still exposed  1/6/21  · Wound improved, bone still exposed  1/13/21  · Bone almost completely covered  1/27/21  · Bone covered, wound improved  2/10/21  · wound improved,depth less  2/24/21  · Wound stable - 3.5 cm depth  3/10/21  · Wound stable 4 cm depth  3/24/21  · Wound stable, same depth not improving - discussed with patient concerning and may need further debridement  3/31/21  · Depth improved 3.5 cm  4/14/21  · Depth improved 3 cm   4/21/21  · Depth improved 2.8 cm  4/28/21  · Depth stable    Wound/Ulcer Pain Timing/Severity: moderate  Quality of pain: aching, throbbing, shooting   Severity: Right - [place], [address] Right 3/20/2020    RIGHT LOWER EXTREMITY THROMBECTOMY, POSSIBLE ANGIOGRAM, POSSIBLE INTERVENTION, POSSIBLE BYPASS. performed by Guerrero Humphreys MD at [address] [address] Right 4/17/2020    RIGHT LEG DEBRIDEMENT INCISION AND DRAINAGE performed by Ester Jodran MD at [address] [address] Right 4/20/2020    RIGHT THIGH WOUND DRESSING CHANGE; DEBRIDEMENT, removal of muscle performed by Ester Jordan MD at [place] [address] Right 4/21/2020    RIGHT THIGH WOUND DRESSING CHANGE POSSIBLE  DEBRIDEMENT - NEEDS BEN Torrez / JANETTE TO SEE performed by Timothy Phan MD at [address] [address] Right 4/23/2020    RIGHT THIGH WOUND DRESSING CHANGE and DEBRIDEMENT performed by Guerrero Humphreys MD at [place] [address] Right 10/15/2020    DEBRIDEMENT RIGHT ABOVE KNEE AMPUTATION POSS. REVISION POSS. WOUND VAC performed by Guerrero Humphreys MD at [address] [address] Right 12/17/2020    DEBRIDEMENT  RIGHT ABOVE KNEE AMPUTATION WITH POSS.  ADVANCED SKIN THERAPY performed by Guerrero Humphreys MD at [address] [facility] Right 11/1/2018    AMPUTATION ABOVE KNEE RIGHT LEG performed by Guerrero Humphreys MD at [facility] Right 5/24/2018    RIGHT FOOT INCISION AND DRAINAGE WITH PARTIAL BONE RESECTION performed by Eduardo Carpenter DPM at [address] [place] [address] OFFICE/OUTPT VISIT,PROCEDURE ONLY Right 8/3/2018    INCISION AND DRAINAGE MULTIPLE AREAS RIGHT FOOT WITH DEBRIDEMENT SOFT TISSUE performed by Eduardo Carpenter DPM at [facility] OR    RHINOPLASTY Right     leg      Family History   Problem Relation Age of Onset    Cancer Mother     Diabetes Father     Heart Failure Father     Hypertension Father     Cancer Sister      Social History     Tobacco Use    Smoking status: Current Every Day Smoker     Packs/day: 0.50     Years: 7.00     Pack years: 3.50     Types: Cigarettes (COLACE) 100 MG capsule Take 1 capsule by mouth 2 times daily (Patient taking differently: Take 100 mg by mouth 2 times daily as needed ) 50 capsule 0    Handicap Placard MISC by Does not apply route Patient cannot walk 200 ft without stopping to rest.    Expiration 10/21/2024 1 each 0    gabapentin (NEURONTIN) 800 MG tablet Take 1 tablet by mouth 4 times daily for 30 days. 120 tablet 5    glucose (GLUTOSE) 40 % GEL Take 37.5 mLs by mouth as needed (hypoglycemia) 45 g 1     No current facility-administered medications on file prior to encounter.         REVIEW OF SYSTEMS See HPI    Objective:    /72   Pulse 88   Temp 98 °F (36.7 °C) (Temporal)   Resp 19   Ht 6' 2\" (1.88 m)   Wt 245 lb (111.1 kg)   BMI 31.46 kg/m²   Wt Readings from Last 3 Encounters:   04/28/21 245 lb (111.1 kg)   03/24/21 245 lb (111.1 kg)   02/24/21 245 lb (111.1 kg)     PHYSICAL EXAM  CONSTITUTIONAL:   Awake, alert, cooperative   EYES:  lids and lashes normal   ENT: external ears and nose without lesions   NECK:  supple, symmetrical, trachea midline   SKIN:  Open wound Present    Assessment:     Problem List Items Addressed This Visit     Disruption of closure of muscle or muscle flap, sequela - Primary (Chronic)    Relevant Orders    Initiate Outpatient Wound Care Protocol    Ischemic ulcer of right thigh with fat layer exposed [facility])    Relevant Orders    Initiate Outpatient Wound Care Protocol          Pre Debridement Measurements:  Are located in the [place]  Documentation Flow Sheet  Post Debridement Measurements:  Wound/Ulcer Descriptions are Pre Debridement except measurements:     Wound 12/17/20 Thigh Right;Lateral #1 (Active)   Wound Image   04/21/21 1059   Dressing Status New dressing applied 04/28/21 1230   Wound Cleansed Cleansed with saline 04/28/21 1230   Dressing/Treatment Alginate;Dry dressing 04/28/21 1230   Offloading for Diabetic Foot Ulcers No offloading required 04/28/21 1230   Wound Length (cm) 0.4 cm 04/28/21 1129   Wound Width (cm) 0.4 cm 04/28/21 1129   Wound Depth (cm) 3 cm 04/28/21 1129   Wound Surface Area (cm^2) 0.16 cm^2 04/28/21 1129   Change in Wound Size % (l*w) 97.96 04/28/21 1129   Wound Volume (cm^3) 0.48 cm^3 04/28/21 1129   Wound Healing % 98 04/28/21 1129   Post-Procedure Length (cm) 0.4 cm 04/28/21 1210   Post-Procedure Width (cm) 0.4 cm 04/28/21 1210   Post-Procedure Depth (cm) 3.2 cm 04/28/21 1210   Post-Procedure Surface Area (cm^2) 0.16 cm^2 04/28/21 1210   Post-Procedure Volume (cm^3) 0.51 cm^3 04/28/21 1210   Wound Assessment Pink/red 04/28/21 1129   Drainage Amount Large 04/28/21 1129   Drainage Description Serosanguinous; Thick;Green;Yellow 04/28/21 1129   Odor None 04/28/21 1129   Nadia-wound Assessment Intact 04/28/21 1129   Number of days: 132     Incision 04/28/20 Thigh Right;Medial (Active)   Number of days: 365   Procedure Note  Indications:  Based on my examination of this patient's wound(s)/ulcer(s) today, debridement is required to promote healing and evaluate the wound base.     Performed by: Cruz Meeks MD    Consent obtained:  Yes    Time out taken:  Yes    Pain Control: Anesthetic  Anesthetic: 4% Lidocaine Cream     Debridement:Excisional Debridement    Using curette the wound(s)/ulcer(s) was/were sharply debrided down through and including the removal of epidermis, dermis, subq,     Devitalized Tissue Debrided:  fibrin, biofilm, slough and exudate to stimulate bleeding to promote healing, post debridement good bleeding base and wound edges noted    Wound/Ulcer #: 1    Percent of Wound/Ulcer Debrided: 100%    Total Surface Area Debrided: 0.16 sq cm     Estimated Blood Loss:  Minimal  Hemostasis Achieved:  by pressure    Procedural Pain:  4 / 10   Post Procedural Pain:  3 / 10      Response to treatment:  Well tolerated    Plan:   Treatment Note please see attached Discharge Instructions    Written patient dismissal instructions given to patient and signed by patient or POA. Discharge Instructions       Visit Discharge/Physician Orders     Discharge condition: Stable     Assessment of pain at discharge:yes     Anesthetic used: 4% lidocaine soln.     Discharge to: Home     Left via:Private automobile     Accompanied by: spouse     [facility]/H[facility]: [facility]     Dressing Orders:RIGHT AKA SITE-Cleanse with normal saline, pack loosely with calcium alginate, dry dressing and secure. Change daily.     Treatment Orders:Eat a diet high in protein and vitamin C. Take a multiple vitamin daily unless contraindicated.      [facility] followup visit: 1 week _____________________________  (Please note your next appointment above and if you are unable to keep, kindly give a 24 hour notice.  Thank you.)     Physician signature:__________________________  Joy Carter  If you experience any of the following, please call the [facility] during business hours:     * Increase in Pain  * Temperature over 101  * Increase in drainage from your wound  * Drainage with a foul odor  * Bleeding  * Increase in swelling  * Need for compression bandage changes due to slippage, breakthrough drainage.     If you need medical attention outside of the business hours of the [facility] please contact your PCP or go to the nearest emergency room.                                                   Electronically signed by Fransisco Philippe MD

## 2021-05-03 RX ORDER — DULOXETIN HYDROCHLORIDE 60 MG/1
CAPSULE, DELAYED RELEASE ORAL
Qty: 180 CAPSULE | Refills: 1 | Status: SHIPPED
Start: 2021-05-03 | End: 2021-10-26

## 2021-05-05 ENCOUNTER — HOSPITAL ENCOUNTER (OUTPATIENT)
Dept: WOUND CARE | Age: 66
Discharge: HOME | End: 2021-05-05
Payer: COMMERCIAL

## 2021-05-12 ENCOUNTER — HOSPITAL ENCOUNTER (OUTPATIENT)
Dept: WOUND CARE | Age: 66
Discharge: HOME | End: 2021-05-12
Payer: COMMERCIAL

## 2021-05-12 ENCOUNTER — APPOINTMENT (OUTPATIENT)
Dept: GENERAL RADIOLOGY | Age: 66
DRG: 498 | End: 2021-05-12
Payer: COMMERCIAL

## 2021-05-12 ENCOUNTER — HOSPITAL ENCOUNTER (INPATIENT)
Age: 66
LOS: 8 days | Discharge: HOME | DRG: 498 | End: 2021-05-20
Attending: EMERGENCY MEDICINE | Admitting: SURGERY
Payer: COMMERCIAL

## 2021-05-12 ENCOUNTER — TELEPHONE (OUTPATIENT)
Dept: VASCULAR SURGERY | Age: 66
End: 2021-05-12

## 2021-05-12 VITALS
SYSTOLIC BLOOD PRESSURE: 124 MMHG | DIASTOLIC BLOOD PRESSURE: 80 MMHG | RESPIRATION RATE: 20 BRPM | TEMPERATURE: 97 F | HEART RATE: 84 BPM

## 2021-05-12 DIAGNOSIS — T81.32XS: Primary | ICD-10-CM

## 2021-05-12 DIAGNOSIS — L97.112: ICD-10-CM

## 2021-05-12 DIAGNOSIS — L08.9 WOUND INFECTION: Primary | ICD-10-CM

## 2021-05-12 DIAGNOSIS — T14.8XXA WOUND INFECTION: Primary | ICD-10-CM

## 2021-05-12 PROBLEM — T87.9 AKA STUMP COMPLICATION (HCC): Status: ACTIVE | Noted: 2021-05-12

## 2021-05-12 LAB
ABO/RH: NORMAL
ALBUMIN SERPL-MCNC: 3.5 G/DL (ref 3.5–5.2)
ALP BLD-CCNC: 124 U/L (ref 40–129)
ALT SERPL-CCNC: 6 U/L (ref 0–40)
ANION GAP SERPL CALCULATED.3IONS-SCNC: 13 MMOL/L (ref 7–16)
ANTIBODY SCREEN: NORMAL
APTT: 32.7 SEC (ref 24.5–35.1)
AST SERPL-CCNC: 11 U/L (ref 0–39)
BASOPHILS ABSOLUTE: 0.08 E9/L (ref 0–0.2)
BASOPHILS RELATIVE PERCENT: 0.7 % (ref 0–2)
BILIRUB SERPL-MCNC: 0.2 MG/DL (ref 0–1.2)
BUN BLDV-MCNC: 20 MG/DL (ref 6–23)
CALCIUM SERPL-MCNC: 9.4 MG/DL (ref 8.6–10.2)
CHLORIDE BLD-SCNC: 96 MMOL/L (ref 98–107)
CO2: 24 MMOL/L (ref 22–29)
CREAT SERPL-MCNC: 1.8 MG/DL (ref 0.7–1.2)
EOSINOPHILS ABSOLUTE: 0.19 E9/L (ref 0.05–0.5)
EOSINOPHILS RELATIVE PERCENT: 1.6 % (ref 0–6)
GFR AFRICAN AMERICAN: 46
GFR NON-AFRICAN AMERICAN: 46 ML/MIN/1.73
GLUCOSE BLD-MCNC: 214 MG/DL (ref 74–99)
HCT VFR BLD CALC: 38.8 % (ref 37–54)
HEMOGLOBIN: 12.5 G/DL (ref 12.5–16.5)
IMMATURE GRANULOCYTES #: 0.07 E9/L
IMMATURE GRANULOCYTES %: 0.6 % (ref 0–5)
INR BLD: 1
LACTIC ACID, SEPSIS: 1.6 MMOL/L (ref 0.5–1.9)
LACTIC ACID, SEPSIS: 1.6 MMOL/L (ref 0.5–1.9)
LYMPHOCYTES ABSOLUTE: 2.72 E9/L (ref 1.5–4)
LYMPHOCYTES RELATIVE PERCENT: 22.9 % (ref 20–42)
MCH RBC QN AUTO: 25.2 PG (ref 26–35)
MCHC RBC AUTO-ENTMCNC: 32.2 % (ref 32–34.5)
MCV RBC AUTO: 78.2 FL (ref 80–99.9)
METER GLUCOSE: 249 MG/DL (ref 74–99)
MONOCYTES ABSOLUTE: 0.84 E9/L (ref 0.1–0.95)
MONOCYTES RELATIVE PERCENT: 7.1 % (ref 2–12)
NEUTROPHILS ABSOLUTE: 7.99 E9/L (ref 1.8–7.3)
NEUTROPHILS RELATIVE PERCENT: 67.1 % (ref 43–80)
PDW BLD-RTO: 14.6 FL (ref 11.5–15)
PLATELET # BLD: 287 E9/L (ref 130–450)
PMV BLD AUTO: 11.2 FL (ref 7–12)
POTASSIUM REFLEX MAGNESIUM: 4.3 MMOL/L (ref 3.5–5)
PROTHROMBIN TIME: 11.3 SEC (ref 9.3–12.4)
RBC # BLD: 4.96 E12/L (ref 3.8–5.8)
SODIUM BLD-SCNC: 133 MMOL/L (ref 132–146)
TOTAL PROTEIN: 8.6 G/DL (ref 6.4–8.3)
WBC # BLD: 11.9 E9/L (ref 4.5–11.5)

## 2021-05-12 PROCEDURE — 87186 SC STD MICRODIL/AGAR DIL: CPT

## 2021-05-12 PROCEDURE — 99283 EMERGENCY DEPT VISIT LOW MDM: CPT

## 2021-05-12 PROCEDURE — 85730 THROMBOPLASTIN TIME PARTIAL: CPT

## 2021-05-12 PROCEDURE — 99284 EMERGENCY DEPT VISIT MOD MDM: CPT

## 2021-05-12 PROCEDURE — 86850 RBC ANTIBODY SCREEN: CPT

## 2021-05-12 PROCEDURE — 86901 BLOOD TYPING SEROLOGIC RH(D): CPT

## 2021-05-12 PROCEDURE — 93005 ELECTROCARDIOGRAM TRACING: CPT | Performed by: STUDENT IN AN ORGANIZED HEALTH CARE EDUCATION/TRAINING PROGRAM

## 2021-05-12 PROCEDURE — 99213 OFFICE O/P EST LOW 20 MIN: CPT

## 2021-05-12 PROCEDURE — 96375 TX/PRO/DX INJ NEW DRUG ADDON: CPT

## 2021-05-12 PROCEDURE — 96374 THER/PROPH/DIAG INJ IV PUSH: CPT

## 2021-05-12 PROCEDURE — 80053 COMPREHEN METABOLIC PANEL: CPT

## 2021-05-12 PROCEDURE — 85610 PROTHROMBIN TIME: CPT

## 2021-05-12 PROCEDURE — 6360000002 HC RX W HCPCS: Performed by: STUDENT IN AN ORGANIZED HEALTH CARE EDUCATION/TRAINING PROGRAM

## 2021-05-12 PROCEDURE — 2580000003 HC RX 258: Performed by: SURGERY

## 2021-05-12 PROCEDURE — 71045 X-RAY EXAM CHEST 1 VIEW: CPT

## 2021-05-12 PROCEDURE — 36415 COLL VENOUS BLD VENIPUNCTURE: CPT

## 2021-05-12 PROCEDURE — 99215 OFFICE O/P EST HI 40 MIN: CPT | Performed by: SURGERY

## 2021-05-12 PROCEDURE — 6370000000 HC RX 637 (ALT 250 FOR IP): Performed by: SURGERY

## 2021-05-12 PROCEDURE — 85025 COMPLETE CBC W/AUTO DIFF WBC: CPT

## 2021-05-12 PROCEDURE — 82962 GLUCOSE BLOOD TEST: CPT

## 2021-05-12 PROCEDURE — 83605 ASSAY OF LACTIC ACID: CPT

## 2021-05-12 PROCEDURE — 87040 BLOOD CULTURE FOR BACTERIA: CPT

## 2021-05-12 PROCEDURE — 1200000000 HC SEMI PRIVATE

## 2021-05-12 PROCEDURE — 87070 CULTURE OTHR SPECIMN AEROBIC: CPT

## 2021-05-12 PROCEDURE — 6360000002 HC RX W HCPCS: Performed by: EMERGENCY MEDICINE

## 2021-05-12 PROCEDURE — 86900 BLOOD TYPING SEROLOGIC ABO: CPT

## 2021-05-12 RX ORDER — LIDOCAINE HYDROCHLORIDE 20 MG/ML
JELLY TOPICAL ONCE
Status: CANCELLED | OUTPATIENT
Start: 2021-05-12 | End: 2021-05-12

## 2021-05-12 RX ORDER — BACITRACIN, NEOMYCIN, POLYMYXIN B 400; 3.5; 5 [USP'U]/G; MG/G; [USP'U]/G
OINTMENT TOPICAL ONCE
Status: CANCELLED | OUTPATIENT
Start: 2021-05-12 | End: 2021-05-12

## 2021-05-12 RX ORDER — NICOTINE POLACRILEX 4 MG
15 LOZENGE BUCCAL PRN
Status: DISCONTINUED | OUTPATIENT
Start: 2021-05-12 | End: 2021-05-20 | Stop reason: HOSPADM

## 2021-05-12 RX ORDER — CILOSTAZOL 50 MG/1
50 TABLET ORAL 2 TIMES DAILY
Status: DISCONTINUED | OUTPATIENT
Start: 2021-05-12 | End: 2021-05-20 | Stop reason: HOSPADM

## 2021-05-12 RX ORDER — ONDANSETRON 2 MG/ML
4 INJECTION INTRAMUSCULAR; INTRAVENOUS EVERY 6 HOURS PRN
Status: DISCONTINUED | OUTPATIENT
Start: 2021-05-12 | End: 2021-05-20 | Stop reason: HOSPADM

## 2021-05-12 RX ORDER — MORPHINE SULFATE 4 MG/ML
6 INJECTION, SOLUTION INTRAMUSCULAR; INTRAVENOUS ONCE
Status: COMPLETED | OUTPATIENT
Start: 2021-05-12 | End: 2021-05-12

## 2021-05-12 RX ORDER — PROMETHAZINE HYDROCHLORIDE 25 MG/1
12.5 TABLET ORAL EVERY 6 HOURS PRN
Status: DISCONTINUED | OUTPATIENT
Start: 2021-05-12 | End: 2021-05-20 | Stop reason: HOSPADM

## 2021-05-12 RX ORDER — SODIUM CHLORIDE 9 MG/ML
25 INJECTION, SOLUTION INTRAVENOUS PRN
Status: DISCONTINUED | OUTPATIENT
Start: 2021-05-12 | End: 2021-05-20 | Stop reason: HOSPADM

## 2021-05-12 RX ORDER — DIPHENHYDRAMINE HYDROCHLORIDE 50 MG/ML
25 INJECTION INTRAMUSCULAR; INTRAVENOUS ONCE
Status: DISCONTINUED | OUTPATIENT
Start: 2021-05-12 | End: 2021-05-20 | Stop reason: HOSPADM

## 2021-05-12 RX ORDER — OXYCODONE HYDROCHLORIDE 5 MG/1
5 TABLET ORAL EVERY 4 HOURS PRN
Status: DISCONTINUED | OUTPATIENT
Start: 2021-05-12 | End: 2021-05-15 | Stop reason: SDUPTHER

## 2021-05-12 RX ORDER — SODIUM CHLORIDE 0.9 % (FLUSH) 0.9 %
5-40 SYRINGE (ML) INJECTION EVERY 12 HOURS SCHEDULED
Status: DISCONTINUED | OUTPATIENT
Start: 2021-05-12 | End: 2021-05-20 | Stop reason: HOSPADM

## 2021-05-12 RX ORDER — CHLORTHALIDONE 25 MG/1
25 TABLET ORAL DAILY
Status: DISCONTINUED | OUTPATIENT
Start: 2021-05-13 | End: 2021-05-20 | Stop reason: HOSPADM

## 2021-05-12 RX ORDER — GABAPENTIN 400 MG/1
800 CAPSULE ORAL 4 TIMES DAILY
Status: DISCONTINUED | OUTPATIENT
Start: 2021-05-12 | End: 2021-05-20 | Stop reason: HOSPADM

## 2021-05-12 RX ORDER — GENTAMICIN SULFATE 1 MG/G
OINTMENT TOPICAL ONCE
Status: CANCELLED | OUTPATIENT
Start: 2021-05-12 | End: 2021-05-12

## 2021-05-12 RX ORDER — BETAMETHASONE DIPROPIONATE 0.05 %
OINTMENT (GRAM) TOPICAL ONCE
Status: CANCELLED | OUTPATIENT
Start: 2021-05-12 | End: 2021-05-12

## 2021-05-12 RX ORDER — MIRTAZAPINE 15 MG/1
7.5 TABLET, FILM COATED ORAL NIGHTLY
Status: DISCONTINUED | OUTPATIENT
Start: 2021-05-12 | End: 2021-05-20 | Stop reason: HOSPADM

## 2021-05-12 RX ORDER — LIDOCAINE HYDROCHLORIDE 40 MG/ML
SOLUTION TOPICAL ONCE
Status: COMPLETED | OUTPATIENT
Start: 2021-05-12 | End: 2021-05-12

## 2021-05-12 RX ORDER — LIDOCAINE HYDROCHLORIDE 40 MG/ML
SOLUTION TOPICAL ONCE
Status: CANCELLED | OUTPATIENT
Start: 2021-05-12 | End: 2021-05-12

## 2021-05-12 RX ORDER — CLONIDINE HYDROCHLORIDE 0.1 MG/1
0.1 TABLET ORAL 3 TIMES DAILY
Status: DISCONTINUED | OUTPATIENT
Start: 2021-05-12 | End: 2021-05-20 | Stop reason: HOSPADM

## 2021-05-12 RX ORDER — SODIUM CHLORIDE, SODIUM LACTATE, POTASSIUM CHLORIDE, CALCIUM CHLORIDE 600; 310; 30; 20 MG/100ML; MG/100ML; MG/100ML; MG/100ML
INJECTION, SOLUTION INTRAVENOUS CONTINUOUS
Status: DISCONTINUED | OUTPATIENT
Start: 2021-05-13 | End: 2021-05-19

## 2021-05-12 RX ORDER — CLOBETASOL PROPIONATE 0.5 MG/G
OINTMENT TOPICAL ONCE
Status: CANCELLED | OUTPATIENT
Start: 2021-05-12 | End: 2021-05-12

## 2021-05-12 RX ORDER — BACITRACIN ZINC AND POLYMYXIN B SULFATE 500; 1000 [USP'U]/G; [USP'U]/G
OINTMENT TOPICAL ONCE
Status: CANCELLED | OUTPATIENT
Start: 2021-05-12 | End: 2021-05-12

## 2021-05-12 RX ORDER — AMLODIPINE BESYLATE 10 MG/1
10 TABLET ORAL DAILY
Status: DISCONTINUED | OUTPATIENT
Start: 2021-05-13 | End: 2021-05-20 | Stop reason: HOSPADM

## 2021-05-12 RX ORDER — GINSENG 100 MG
CAPSULE ORAL ONCE
Status: CANCELLED | OUTPATIENT
Start: 2021-05-12 | End: 2021-05-12

## 2021-05-12 RX ORDER — LIDOCAINE 50 MG/G
OINTMENT TOPICAL ONCE
Status: CANCELLED | OUTPATIENT
Start: 2021-05-12 | End: 2021-05-12

## 2021-05-12 RX ORDER — DEXTROSE MONOHYDRATE 25 G/50ML
12.5 INJECTION, SOLUTION INTRAVENOUS PRN
Status: DISCONTINUED | OUTPATIENT
Start: 2021-05-12 | End: 2021-05-20 | Stop reason: HOSPADM

## 2021-05-12 RX ORDER — DEXTROSE MONOHYDRATE 50 MG/ML
100 INJECTION, SOLUTION INTRAVENOUS PRN
Status: DISCONTINUED | OUTPATIENT
Start: 2021-05-12 | End: 2021-05-20 | Stop reason: HOSPADM

## 2021-05-12 RX ORDER — DULOXETIN HYDROCHLORIDE 60 MG/1
120 CAPSULE, DELAYED RELEASE ORAL DAILY
Status: DISCONTINUED | OUTPATIENT
Start: 2021-05-13 | End: 2021-05-20 | Stop reason: HOSPADM

## 2021-05-12 RX ORDER — BLOOD SUGAR DIAGNOSTIC
1 STRIP MISCELLANEOUS
Status: DISCONTINUED | OUTPATIENT
Start: 2021-05-12 | End: 2021-05-12 | Stop reason: CLARIF

## 2021-05-12 RX ORDER — SODIUM CHLORIDE 0.9 % (FLUSH) 0.9 %
5-40 SYRINGE (ML) INJECTION PRN
Status: DISCONTINUED | OUTPATIENT
Start: 2021-05-12 | End: 2021-05-20 | Stop reason: HOSPADM

## 2021-05-12 RX ORDER — OXYCODONE HCL 10 MG/1
10 TABLET, FILM COATED, EXTENDED RELEASE ORAL EVERY 12 HOURS SCHEDULED
Status: DISCONTINUED | OUTPATIENT
Start: 2021-05-12 | End: 2021-05-20 | Stop reason: HOSPADM

## 2021-05-12 RX ORDER — HYDRALAZINE HYDROCHLORIDE 25 MG/1
25 TABLET, FILM COATED ORAL 3 TIMES DAILY
Status: DISCONTINUED | OUTPATIENT
Start: 2021-05-12 | End: 2021-05-20 | Stop reason: HOSPADM

## 2021-05-12 RX ORDER — LIDOCAINE 40 MG/G
CREAM TOPICAL ONCE
Status: CANCELLED | OUTPATIENT
Start: 2021-05-12 | End: 2021-05-12

## 2021-05-12 RX ADMIN — MIRTAZAPINE 7.5 MG: 15 TABLET, FILM COATED ORAL at 21:15

## 2021-05-12 RX ADMIN — METOPROLOL TARTRATE 25 MG: 25 TABLET, FILM COATED ORAL at 21:16

## 2021-05-12 RX ADMIN — MORPHINE SULFATE 6 MG: 4 INJECTION, SOLUTION INTRAMUSCULAR; INTRAVENOUS at 15:11

## 2021-05-12 RX ADMIN — HYDRALAZINE HYDROCHLORIDE 25 MG: 25 TABLET, FILM COATED ORAL at 21:16

## 2021-05-12 RX ADMIN — SODIUM CHLORIDE, POTASSIUM CHLORIDE, SODIUM LACTATE AND CALCIUM CHLORIDE: 600; 310; 30; 20 INJECTION, SOLUTION INTRAVENOUS at 23:27

## 2021-05-12 RX ADMIN — LIDOCAINE HYDROCHLORIDE 15 ML: 40 SOLUTION TOPICAL at 12:00

## 2021-05-12 RX ADMIN — GABAPENTIN 800 MG: 400 CAPSULE ORAL at 21:16

## 2021-05-12 RX ADMIN — OXYCODONE HYDROCHLORIDE 10 MG: 10 TABLET, FILM COATED, EXTENDED RELEASE ORAL at 21:16

## 2021-05-12 RX ADMIN — INSULIN LISPRO 3 UNITS: 100 INJECTION, SOLUTION INTRAVENOUS; SUBCUTANEOUS at 21:18

## 2021-05-12 RX ADMIN — HYDROMORPHONE HYDROCHLORIDE 0.5 MG: 1 INJECTION, SOLUTION INTRAMUSCULAR; INTRAVENOUS; SUBCUTANEOUS at 17:28

## 2021-05-12 RX ADMIN — CLONIDINE HYDROCHLORIDE 0.1 MG: 0.1 TABLET ORAL at 21:15

## 2021-05-12 RX ADMIN — CILOSTAZOL 50 MG: 50 TABLET ORAL at 21:16

## 2021-05-12 ASSESSMENT — PAIN DESCRIPTION - LOCATION: LOCATION: HIP

## 2021-05-12 ASSESSMENT — PAIN DESCRIPTION - ORIENTATION
ORIENTATION: RIGHT
ORIENTATION: RIGHT

## 2021-05-12 ASSESSMENT — ENCOUNTER SYMPTOMS
NAUSEA: 0
EYE DISCHARGE: 0
SHORTNESS OF BREATH: 0
EYE REDNESS: 0
ABDOMINAL PAIN: 0
WHEEZING: 0
COUGH: 0
SORE THROAT: 0
BACK PAIN: 0
DIARRHEA: 0
SINUS PRESSURE: 0
VOMITING: 0
EYE PAIN: 0

## 2021-05-12 ASSESSMENT — PAIN DESCRIPTION - DESCRIPTORS: DESCRIPTORS: BURNING;SHARP

## 2021-05-12 ASSESSMENT — PAIN DESCRIPTION - PROGRESSION: CLINICAL_PROGRESSION: NOT CHANGED

## 2021-05-12 ASSESSMENT — PAIN SCALES - GENERAL
PAINLEVEL_OUTOF10: 10
PAINLEVEL_OUTOF10: 10

## 2021-05-12 NOTE — H&P
[] Vision Changes [] Focal weakness   Respiratory [] Slurred Speech    [] Shortness of breath ENT   Cardiovascular [] Difficulty swallowing   [] Chest Pain Endocrine    [] Shortness of breath with exertion [] Increased Thirst   Gastrointestinal    [] Abdominal Pain    [] Melena   [] Hematochezia         Past Medical History:   Diagnosis Date    Acute kidney injury ([address] 5/18/2020    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 ([address] 5/22/2018    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 ([address] 5/21/2018    Cellulitis, scrotum 3/20/2020    Critical lower limb ischemia 3/20/2020    Diabetes mellitus ([address]     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 2 diabetes mellitus, with fat layer exposed ([address] 5/22/2018    Disruption of closure of muscle or muscle flap, sequela 8/26/2020    DVT, lower extremity ([address]     right leg     Encounter for dental examination and cleaning with abnormal findings 4/2/2018    Gas gangrene of thigh ([address] 3/20/2020    History of DVT (deep vein thrombosis) 7/31/2018    Hyperglycemia due to type 2 diabetes mellitus ([address] 3/20/2020    Hyperlipidemia     Hypertension     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[address]     Osteomyelitis ([address] 10/24/2018    PVD (peripheral vascular disease) ([address]         Past Surgical History:   Procedure Laterality Date    AMPUTATION ABOVE KNEE Right 4/28/2020    DEBRIDEMENT OF AMPUTATION RIGHT ABOVE KNEE performed by Eusebio Mcbride MD at [address] [facility] Right 4/30/2020    DEBRIDEMENT OF AMPUTATION RIGHT ABOVE KNEE,  POSS. ABOVE KNEE REVISION, POSS. CLOSURE, POSS.WOUND VAC -- BEN Carbajal / Priscilla Board TO LOOK IN performed by Eusebio Mcbride MD at [place] [address] Right 3/20/2020    RIGHT LOWER EXTREMITY THROMBECTOMY, POSSIBLE ANGIOGRAM, POSSIBLE INTERVENTION, POSSIBLE BYPASS.  performed by Lakeisha Arteaga MD at [address] Right 4/17/2020    RIGHT LEG DEBRIDEMENT INCISION AND DRAINAGE performed by Faby Moreno MD at [address] [place] [address] Right 4/20/2020    RIGHT THIGH WOUND DRESSING CHANGE; DEBRIDEMENT, removal of muscle performed by Faby Moreno MD at [address] [place] [address] Right 4/21/2020    RIGHT THIGH WOUND DRESSING CHANGE POSSIBLE  DEBRIDEMENT - NEEDS DRSClarisse Shepherd / JANETTE TO SEE performed by Mohsen Rubin MD at [address] [place] [address] Right 4/23/2020    RIGHT THIGH WOUND DRESSING CHANGE and DEBRIDEMENT performed by Lakeisha Arteaga MD at [address] [place] [address] Right 10/15/2020    DEBRIDEMENT RIGHT ABOVE KNEE AMPUTATION POSS. REVISION POSS. WOUND VAC performed by Lakeisha Arteaga MD at [address] [place] [address] Right 12/17/2020    DEBRIDEMENT  RIGHT ABOVE KNEE AMPUTATION WITH POSS.  ADVANCED SKIN THERAPY performed by Lakeisha Arteaga MD at [facility]  Right 11/1/2018    AMPUTATION ABOVE KNEE RIGHT LEG performed by Lakeisha Arteaga MD at [facility] Right 5/24/2018    RIGHT FOOT INCISION AND DRAINAGE WITH PARTIAL BONE RESECTION performed by Titus Brizuela DPM at [address] [facility] OFFICE/OUTPT VISIT,PROCEDURE ONLY Right 8/3/2018    INCISION AND DRAINAGE MULTIPLE AREAS RIGHT FOOT WITH DEBRIDEMENT SOFT TISSUE performed by Titus Brizuela DPM at [facility] Right     leg      Current Medications:    sodium chloride      [START ON 5/13/2021] lactated ringers        oxyCODONE, sodium chloride flush, sodium chloride, promethazine **OR** ondansetron, HYDROmorphone    diphenhydrAMINE  25 mg Intravenous Once    cloNIDine  0.1 mg Oral TID    cilostazol  50 mg Oral BID    [START ON 5/13/2021] amLODIPine  10 mg Oral Daily    hydrALAZINE  25 mg Oral TID    gabapentin  800 mg Oral 4x Daily    metoprolol tartrate  25 mg Oral BID    insulin lispro  15 Units Subcutaneous TID AC    oxyCODONE  10 mg Oral 2 times per day    [START ON 5/13/2021] chlorthalidone  25 mg Oral Daily    mirtazapine  7.5 mg Oral Nightly    [START ON 5/13/2021] DULoxetine  120 mg Oral Daily    sodium chloride flush  5-40 mL Intravenous 2 times per day    enoxaparin  40 mg Subcutaneous Daily    [START ON 5/13/2021] insulin lispro  0-18 Units Subcutaneous TID WC    insulin lispro  0-9 Units Subcutaneous Nightly    vancomycin  1,000 mg Intravenous See Admin Instructions      Allergies:  Patient has no known allergies.   Social History     Socioeconomic History    Marital status: Single     Spouse name: Not on file    Number of children: Not on file    Years of education: Not on file    Highest education level: Not on file   Occupational History    Not on file   Social Needs    Financial resource strain: Not on file    Food insecurity     Worry: Not on file     Inability: Not on file    Transportation needs     Medical: Not on file     Non-medical: Not on file   Tobacco Use    Smoking status: Current Every Day Smoker     Packs/day: 0.50     Years: 7.00     Pack years: 3.50     Types: Cigarettes    Smokeless tobacco: Never Used   Substance and Sexual Activity    Alcohol use: No     Frequency: Never    Drug use: No    Sexual activity: Not on file   Lifestyle    Physical activity     Days per week: Not on file     Minutes per session: Not on file    Stress: Not on file   Relationships    Social connections     Talks on phone: Not on file     Gets together: Not on file     Attends Jain service: Not on file     Active member of club or organization: Not on file     Attends meetings of clubs or organizations: Not on file     Relationship status: Not on file    Intimate partner violence     Fear of current or ex partner: Not on file     Emotionally abused: Not on file     Physically abused: Not on file     Forced sexual activity: Not on file   Other Topics Concern    Not on file Social History Narrative    Not on file     Family History   Problem Relation Age of Onset    Cancer Mother     Diabetes Father     Heart Failure Father     Hypertension Father     Cancer Sister      PHYSICAL EXAM:    /71   Pulse 76   Temp 98.7 °F (37.1 °C) (Temporal)   Resp 18   Wt 245 lb (111.1 kg)   SpO2 98%   BMI 31.46 kg/m²   CONSTITUTIONAL:   Awake, alert, cooperative  PSYCHIATRIC :  Oriented to time, place and person       Appropriate insight to disease process  EYES: Lids and lashes normal  ENT:  External ears and nose without lesions   Hearing deficits not noted  NECK: Supple, symmetrical, trachea midline   Thyroid goiter not appreciated  LUNGS:  No increased work of breathing                 Good respiratory excursion  CARDIOVASCULAR:  regular rate and rhythm   ABDOMEN:  soft, non-distended, non-tender   Aorta is not palpable  Lymphatics : Cervical lymphadenopathy notnoted     Femoral lymphadenopathy notnoted  SKIN:   Normal skin color   Texture and turgor normal, no induration  EXTREMITIES:   R UE 5/5 strength   No cyanosis noted in nail beds  L UE 5/5 strength   No cyanosis noted in nail beds  R LE Edema present - fullness   Open wounds present   L LE Edema present   Open wound absent    LABS:    Lab Results   Component Value Date    WBC 11.9 (H) 05/12/2021    HGB 12.5 05/12/2021    HCT 38.8 05/12/2021     05/12/2021    PROTIME 11.3 05/12/2021    INR 1.0 05/12/2021    K 4.3 05/12/2021    BUN 20 05/12/2021    CREATININE 1.8 (H) 05/12/2021     Lab Results   Component Value Date    LABA1C 6.9 08/03/2020     No results found for: EAG  Lab Results   Component Value Date    LABPROT 0.4 (H) 05/23/2018    LABPROT 0.4 05/23/2018    LABALBU 3.5 05/12/2021        Radiology pertinent to vascular surgery evaluation reviewed    Assesment/Plan  R AKA wound  · Plan for or debridement  I reviewed the procedure with the patient and family as available.   I discussed the procedure, risks, benefits, complications, and alternatives of the procedure. They understand and consent.   All questions were answered  Discussed with Dr. Lorenzo Dhillon - will give vanco and cefepime perioperatively    Sydnee Birch

## 2021-05-12 NOTE — PROGRESS NOTES
Wound Healing Center Followup Visit Note    Referring Physician : Nohelia Humphreysaureliosunday [address] RECORD NUMBER:  [id number]  AGE: 63 y.o. GENDER: male  : 1955  EPISODE DATE:  2021    Subjective:     Chief Complaint   Patient presents with    Wound Check     rt leg      HISTORY of PRESENT ILLNESS RONNY Veras is a 63 y.o. male who presents today in regards to follow up evaluation and treatment of wound/ulcer. That patient's past medical, family and social hx were reviewed and changes were made if present. History of Wound Context:  Pt presents in regards to chronic R above knee amputation wound was since 2020. He had developed postoperatively dehiscence of his wound site which had previously undergone a muscle flap. He was having a packed per home health care. The wound had gotten so small that it was difficult to pack. He is still having drainage significant though. At first visit to wound healing center 20 his wound has not been improving. He was not on abx at initial visit to center.     Previous lower extremity procedures  2018  R AKA for chronic R LE wound   3/20/2020 R groin exploration with iliofemoral artery embolectomy, primary closure of arteriotomy, R deep femoral atery embolectomy   2020 Excisional debridement of infected necrotic  tissue, right groin and right thigh   2020 RIGHT THIGH WOUND DRESSING CHANGE and DEBRIDEMENT   2020 Irrigation and excisional debridement of Right Thigh above knee amputation wound   2020 Irrigation and excisional debridement of the Right thigh above knee amputation wound 25 x45 cm  Excision of prosthetic R LE bypass graft  Ligation of right SFA  Posterior hamstring myocutaneous flap 65 cm flap for open wound of 45 x 25 cm per Dr. Jauregui     20  · Wound itself is small - opened up as difficult to pack  · 5 cm depth - culture done, significant amount of fluid expressed once opened up  · aquacell ag packing  · Discussed potential need for further surgical exploration in future - pt would like to avoid hospital and OR if possible  9/2/20  · Wound much improved - depth less  · Start doxycyline for staph culture  9/9/20  · Depth 5 cm again, fluid seems less  9/23/20  · Tunneling now 8 cm, concern that hittig bone  · Discussed with patient continuing current reigmen unlikely to result in healing  · Would benefit from further surgical debridement, likely revision of amputation site and possible wound vac  · Patient would like to consider options and will let me know next week  9/30/20  · Significant tunneling still  · Will proceed with or in 2 weeks per pt request  10/21/20  · OR 10/14/20  · dakins moistened kerlex  · Discussed with Dr. Lynn Pickering re pain control issues  · Doxycycline 10 days script given  10/28/20  · Pain better controlled  · + Granulation tissue  · Bone less exposed  11/4/20  · Wound health  · Bone still exposed  11/18/20  · Wound continues to be improved  · Less bone exposed  11/25/20  · Bone exposure still an issue  · Will take for surgery - try dermagraft  · If not successful will likely need revision in future  12/9/20  · Bone exposure, wound overall improvd  · Patient did not follow though with testing for surgery  12/17/20  · dermagraft application  80/20/60  · Wound improved, bone still exposed  1/6/21  · Wound improved, bone still exposed  1/13/21  · Bone almost completely covered  1/27/21  · Bone covered, wound improved  2/10/21  · wound improved,depth less  2/24/21  · Wound stable - 3.5 cm depth  3/10/21  · Wound stable 4 cm depth  3/24/21  · Wound stable, same depth not improving - discussed with patient concerning and may need further debridement  3/31/21  · Depth improved 3.5 cm  4/14/21  · Depth improved 3 cm   4/21/21  · Depth improved 2.8 cm  4/28/21  · Depth stable  5/12/21  · Increased pain  · Suspect wound has closed down to small and needs opened up as fluid is being trapped  · Will admit through er  · Or planned for tomorrow    Wound/Ulcer Pain Timing/Severity:  severe  Quality of pain: aching, throbbing, shooting   Severity:  8 / 10   Modifying Factors: Pain worsens with dressing changes, pressure, debridement  Associated Signs/Symptoms: drainage and pain    Ulcer Identification:  Ulcer Type: arterial, diabetic, pressure and non-healing surgical  Contributing Factors: edema, diabetes, poor glucose control, chronic pressure, decreased mobility, shear force, obesity and arterial insufficiency    Diabetic/Pressure/Non Pressure Ulcers only:  Ulcer: Diabetic ulcer, fat layer exposed    Wound: Wound dehiscence        PAST MEDICAL HISTORY      Diagnosis Date    Acute kidney injury ([address] 5/18/2020    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 ([address] 5/22/2018    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 ([address] 5/21/2018    Cellulitis, scrotum 3/20/2020    Critical lower limb ischemia 3/20/2020    Diabetes mellitus ([address]     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 2 diabetes mellitus, with fat layer exposed ([address] 5/22/2018    Disruption of closure of muscle or muscle flap, sequela 8/26/2020    DVT, lower extremity ([address]     right leg     Encounter for dental examination and cleaning with abnormal findings 4/2/2018    Gas gangrene of thigh ([address] 3/20/2020    History of DVT (deep vein thrombosis) 7/31/2018    Hyperglycemia due to type 2 diabetes mellitus ([address] 3/20/2020    Hyperlipidemia     Hypertension     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[address]     Osteomyelitis ([address] 10/24/2018    PVD (peripheral vascular disease) ([address]      Past Surgical History:   Procedure Laterality Date    AMPUTATION ABOVE KNEE Right 4/28/2020    DEBRIDEMENT OF AMPUTATION RIGHT ABOVE KNEE performed by Stephane Cardona MD at [address] [place] [address] Right 4/30/2020    DEBRIDEMENT OF AMPUTATION RIGHT ABOVE Cancer Sister      Social History     Tobacco Use    Smoking status: Current Every Day Smoker     Packs/day: 0.50     Years: 7.00     Pack years: 3.50     Types: Cigarettes    Smokeless tobacco: Never Used   Substance Use Topics    Alcohol use: No     Frequency: Never    Drug use: No     No Known Allergies  Current Outpatient Medications on File Prior to Encounter   Medication Sig Dispense Refill    DULoxetine (CYMBALTA) 60 MG extended release capsule TAKE TWO CAPSULES BY MOUTH EVERY MORNING 180 capsule 1    chlorthalidone (HYGROTON) 25 MG tablet TAKE 1 TABLET BY MOUTH ONE TIME A DAY 30 tablet 3    mirtazapine (REMERON) 7.5 MG tablet TAKE 1 TABLET BY MOUTH ONE TIME A DAY NIGHTLY 90 tablet 1    oxyCODONE (OXYCONTIN) 10 MG extended release tablet Take 1 tablet by mouth every 12 hours as needed for Pain for up to 30 days. 60 each 0    insulin lispro (HUMALOG) 100 UNIT/ML injection vial Inject 15 Units into the skin 3 times daily (before meals) 15 vial 3    oxyCODONE (ROXICODONE) 5 MG immediate release tablet Take 1 tablet by mouth every 4-6 hours as needed for Pain for up to 30 days. 180 tablet 0    ammonium lactate (LAC-HYDRIN) 12 % lotion Apply topically as needed. 1 Bottle 0    metoprolol tartrate (LOPRESSOR) 25 MG tablet Take 1 tablet by mouth 2 times daily 90 tablet 1    hydrALAZINE (APRESOLINE) 25 MG tablet Take 1 tablet by mouth 3 times daily 90 tablet 3    amLODIPine (NORVASC) 10 MG tablet Take 1 tablet by mouth daily 90 tablet 1    cloNIDine (CATAPRES) 0.1 MG tablet TAKE ONE TABLET BY MOUTH THREE TIMES DAILY 270 tablet 3    cilostazol (PLETAL) 50 MG tablet Take 1 tablet by mouth 2 times daily 180 tablet 1    mineral oil-hydrophilic petrolatum (AQUAPHOR) ointment Apply topically twice daily dispense 14 ounce jar 396 g 5    blood glucose test strips (ONETOUCH ULTRA) strip 1 each by Other route 3 times daily As needed.  300 each 1    Insulin Syringe-Needle U-100 (ULTICARE INSULIN SYRINGE) 31G X 5/16\" 0.3 ML MISC 1 each by Other route 5 times daily 100 each 3    glucose (GLUTOSE) 40 % GEL Take 37.5 mLs by mouth as needed (hypoglycemia) 45 g 1    bisacodyl (DULCOLAX) 5 MG EC tablet Take 1 tablet by mouth daily as needed for Constipation 30 tablet 0    docusate sodium (COLACE) 100 MG capsule Take 1 capsule by mouth 2 times daily (Patient taking differently: Take 100 mg by mouth 2 times daily as needed ) 50 capsule 0    Handicap Placard MISC by Does not apply route Patient cannot walk 200 ft without stopping to rest.    Expiration 10/21/2024 1 each 0    gabapentin (NEURONTIN) 800 MG tablet Take 1 tablet by mouth 4 times daily for 30 days. 120 tablet 5     No current facility-administered medications on file prior to encounter.         REVIEW OF SYSTEMS See HPI    Objective:    /80   Pulse 84   Temp 97 °F (36.1 °C) (Temporal)   Resp 20   Wt Readings from Last 3 Encounters:   04/28/21 245 lb (111.1 kg)   03/24/21 245 lb (111.1 kg)   02/24/21 245 lb (111.1 kg)     PHYSICAL EXAM  CONSTITUTIONAL:   Awake, alert, cooperative   EYES:  lids and lashes normal   ENT: external ears and nose without lesions   NECK:  supple, symmetrical, trachea midline   SKIN:  Open wound Present    Assessment:     Problem List Items Addressed This Visit     Disruption of closure of muscle or muscle flap, sequela - Primary (Chronic)    Relevant Orders    Initiate Outpatient Wound Care Protocol    Ischemic ulcer of right thigh with fat layer exposed [facility])    Relevant Orders    Initiate Outpatient Wound Care Protocol          Pre Debridement Measurements:  Are located in the [facility]  Documentation Flow Sheet  Post Debridement Measurements:  Wound/Ulcer Descriptions are Pre Debridement except measurements:     Wound 12/17/20 Thigh Right;Lateral #1 (Active)   Wound Image   04/21/21 1059   Dressing Status New dressing applied 04/28/21 1230   Wound Cleansed Cleansed with saline 04/28/21 1230   Dressing/Treatment Alginate;Dry dressing 04/28/21 1230   Offloading for Diabetic Foot Ulcers No offloading required 04/28/21 1230   Wound Length (cm) 0.2 cm 05/12/21 1203   Wound Width (cm) 0.2 cm 05/12/21 1203   Wound Depth (cm) 3 cm 05/12/21 1203   Wound Surface Area (cm^2) 0.04 cm^2 05/12/21 1203   Change in Wound Size % (l*w) 99.49 05/12/21 1203   Wound Volume (cm^3) 0.12 cm^3 05/12/21 1203   Wound Healing % 100 05/12/21 1203   Post-Procedure Length (cm) 0.4 cm 04/28/21 1210   Post-Procedure Width (cm) 0.4 cm 04/28/21 1210   Post-Procedure Depth (cm) 3.2 cm 04/28/21 1210   Post-Procedure Surface Area (cm^2) 0.16 cm^2 04/28/21 1210   Post-Procedure Volume (cm^3) 0.51 cm^3 04/28/21 1210   Wound Assessment Pink/red 05/12/21 1203   Drainage Amount Large 05/12/21 1203   Drainage Description Thick; Serosanguinous 05/12/21 1203   Odor None 05/12/21 1203   Nadia-wound Assessment Intact 05/12/21 1203   Number of days: 145     Incision 04/28/20 Thigh Right;Medial (Active)   Number of days: 379      No debridement    Plan:   Treatment Note please see attached Discharge Instructions    Written patient dismissal instructions given to patient and signed by patient or POA. Discharge Instructions        Visit Discharge/Physician Orders     Discharge condition: Stable     Assessment of pain at discharge:yes     Anesthetic used: 4% lidocaine soln.     Discharge to: Home     Left via:Private automobile     Accompanied by: spouse     [facility]: [facility]     Dressing Orders:RIGHT AKA SITE-Cleanse with normal saline, pack loosely with calcium alginate, dry dressing and secure. Change daily.     Treatment Orders:Eat a diet high in protein and vitamin C. Take a multiple vitamin daily unless contraindicated.      [address] [place] [address] followup visit: 2 week _____________________________  (Please note your next appointment above and if you are unable to keep, kindly give a 24 hour notice.  Thank you.)     Physician signature:__________________________  Endy Gore  If you experience

## 2021-05-12 NOTE — ED PROVIDER NOTES
Patient is a 73-year-old male presenting to emergency department for a wound check. He was sent in by his vascular surgeon for a right stump wound. He states he originally had the leg amputated and 2018, and then had to get it revised for an above-knee amputation later on. He has had edema and drainage coming from a wound on his AKA for the last few weeks, has been worsening, nothing makes it better, was gradual in onset, persistent. He has some associated pain at the area but no other associated symptoms. Denies any nausea, vomiting, fevers, chills. Dr. Jessie Benitez is on admitting patient for operation tomorrow. Review of Systems   Constitutional: Negative for chills and fever. HENT: Negative for ear pain, sinus pressure and sore throat. Eyes: Negative for pain, discharge and redness. Respiratory: Negative for cough, shortness of breath and wheezing. Cardiovascular: Negative for chest pain. Gastrointestinal: Negative for abdominal pain, diarrhea, nausea and vomiting. Genitourinary: Negative for dysuria and frequency. Musculoskeletal: Positive for arthralgias (Right AKA wound and drainage). Negative for back pain. Skin: Positive for wound. Negative for rash. Neurological: Negative for weakness and headaches. Hematological: Negative for adenopathy. All other systems reviewed and are negative. Physical Exam  Vitals signs and nursing note reviewed. Constitutional:       Appearance: He is well-developed. HENT:      Head: Normocephalic and atraumatic. Eyes:      Conjunctiva/sclera: Conjunctivae normal.   Neck:      Musculoskeletal: Normal range of motion and neck supple. Cardiovascular:      Rate and Rhythm: Normal rate and regular rhythm. Heart sounds: Normal heart sounds. No murmur. Pulmonary:      Effort: Pulmonary effort is normal. No respiratory distress. Breath sounds: Normal breath sounds. No wheezing or rales.    Abdominal:      General: Bowel sounds are normal.      Palpations: Abdomen is soft. Tenderness: There is no abdominal tenderness. There is no guarding or rebound. Musculoskeletal:         General: No tenderness or deformity. Comments: AKA, there is drainage coming from a wound   Skin:     General: Skin is warm and dry. Neurological:      Mental Status: He is alert and oriented to person, place, and time. Cranial Nerves: No cranial nerve deficit. Coordination: Coordination normal.          Procedures     MDM     ED Course as of May 12 1914   Wed May 12, 2021   1507 EKG: This EKG is signed by emergency department physician. Rate: 75  Rhythm: Sinus and first-degree AV block  Interpretation: Nonspecific ST changes  Comparison: stable as compared to patient's most recent EKG         [JG]   1517 Put out consult to PK for admission, pain better controlled    [JG]   1600 Patient agreeable to admission, his pain is under control at this time, remains afebrile. [JG]      ED Course User Index  [JG] Jim Martinez MD      Patient was sent in by his his vascular surgeon for an AKA wound infection. He was admitted to [place] [address] by his vascular surgeon for further management. He did not have a white blood cell count, remained afebrile, therefore he was not antibiosed per request of his surgeon.         --------------------------------------------- PAST HISTORY ---------------------------------------------  Past Medical History:  has a past medical history of Acute kidney injury ([address] 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 ([address] 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 ([address] Cellulitis, scrotum, Critical lower limb ischemia, Diabetes mellitus ([address] 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 2 diabetes mellitus, with fat layer exposed ([address] Disruption of closure of muscle or muscle flap, sequela, DVT, lower extremity ([address] Encounter for dental examination and cleaning with abnormal findings, Gas gangrene of thigh ([facility] History of DVT (deep vein thrombosis), Hyperglycemia due to type 2 diabetes mellitus ([facility] Hyperlipidemia, Hypertension, 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[facility] Osteomyelitis ([facility] and PVD (peripheral vascular disease) ([facility] Past Surgical History:  has a past surgical history that includes rhinoplasty (Right); pr deep dissec foot infec,1 bursa (Right, 5/24/2018); femoral bypass; pr office/outpt visit,procedure only (Right, 8/3/2018); pr amputate thigh,re-amputatn (Right, 11/1/2018); FEMORAL ENDARTERECTOMY (Right, 3/20/2020); Leg Surgery (Right, 4/17/2020); Leg Surgery (Right, 4/20/2020); Leg Surgery (Right, 4/21/2020); Leg Surgery (Right, 4/23/2020); AMPUTATION ABOVE KNEE (Right, 4/28/2020); AMPUTATION ABOVE KNEE (Right, 4/30/2020); Leg Surgery (Right, 10/15/2020); and Leg Surgery (Right, 12/17/2020). Social History:  reports that he has been smoking cigarettes. He has a 3.50 pack-year smoking history. He has never used smokeless tobacco. He reports that he does not drink alcohol or use drugs. Family History: family history includes Cancer in his mother and sister; Diabetes in his father; Heart Failure in his father; Hypertension in his father. The patients home medications have been reviewed. Allergies: Patient has no known allergies.     -------------------------------------------------- RESULTS -------------------------------------------------    LABS:  Results for orders placed or performed during the hospital encounter of 05/12/21   Lactate, Sepsis   Result Value Ref Range    Lactic Acid, Sepsis 1.6 0.5 - 1.9 mmol/L   CBC auto differential   Result Value Ref Range    WBC 11.9 (H) 4.5 - 11.5 E9/L    RBC 4.96 3.80 - 5.80 E12/L    Hemoglobin 12.5 12.5 - 16.5 g/dL    Hematocrit 38.8 37.0 - 54.0 %    MCV 78.2 (L) 80.0 - 99.9 fL    MCH 25.2 (L) 26.0 - 35.0 pg    MCHC 32.2 32.0 - 34.5 %    RDW 14.6 11.5 - 15.0 fL    Platelets 400 653 - 134 E9/L MPV 11.2 7.0 - 12.0 fL    Neutrophils % 67.1 43.0 - 80.0 %    Immature Granulocytes % 0.6 0.0 - 5.0 %    Lymphocytes % 22.9 20.0 - 42.0 %    Monocytes % 7.1 2.0 - 12.0 %    Eosinophils % 1.6 0.0 - 6.0 %    Basophils % 0.7 0.0 - 2.0 %    Neutrophils Absolute 7.99 (H) 1.80 - 7.30 E9/L    Immature Granulocytes # 0.07 E9/L    Lymphocytes Absolute 2.72 1.50 - 4.00 E9/L    Monocytes Absolute 0.84 0.10 - 0.95 E9/L    Eosinophils Absolute 0.19 0.05 - 0.50 E9/L    Basophils Absolute 0.08 0.00 - 0.20 E9/L   Comprehensive Metabolic Panel w/ Reflex to MG   Result Value Ref Range    Sodium 133 132 - 146 mmol/L    Potassium reflex Magnesium 4.3 3.5 - 5.0 mmol/L    Chloride 96 (L) 98 - 107 mmol/L    CO2 24 22 - 29 mmol/L    Anion Gap 13 7 - 16 mmol/L    Glucose 214 (H) 74 - 99 mg/dL    BUN 20 6 - 23 mg/dL    CREATININE 1.8 (H) 0.7 - 1.2 mg/dL    GFR Non-African American 46 >=60 mL/min/1.73    GFR African American 46     Calcium 9.4 8.6 - 10.2 mg/dL    Total Protein 8.6 (H) 6.4 - 8.3 g/dL    Albumin 3.5 3.5 - 5.2 g/dL    Total Bilirubin 0.2 0.0 - 1.2 mg/dL    Alkaline Phosphatase 124 40 - 129 U/L    ALT 6 0 - 40 U/L    AST 11 0 - 39 U/L   Protime-INR   Result Value Ref Range    Protime 11.3 9.3 - 12.4 sec    INR 1.0    APTT   Result Value Ref Range    aPTT 32.7 24.5 - 35.1 sec   EKG 12 Lead   Result Value Ref Range    Ventricular Rate 75 BPM    Atrial Rate 75 BPM    P-R Interval 322 ms    QRS Duration 88 ms    Q-T Interval 370 ms    QTc Calculation (Bazett) 413 ms    P Axis 23 degrees    T Axis -22 degrees   TYPE AND SCREEN   Result Value Ref Range    ABO/Rh A POS     Antibody Screen NEG        RADIOLOGY:  No orders to display             ------------------------- NURSING NOTES AND VITALS REVIEWED ---------------------------  Date / Time Roomed:  5/12/2021  1:52 PM  ED Bed Assignment:  [id number]    The nursing notes within the ED encounter and vital signs as below have been reviewed.      Patient Vitals for the past 24 hrs:   BP Temp Temp src Pulse Resp SpO2 Weight   05/12/21 1900 130/71 98.7 °F (37.1 °C) Temporal 76 18 98 % --   05/12/21 1815 136/78 98 °F (36.7 °C) -- 74 18 98 % --   05/12/21 1334 115/81 97.3 °F (36.3 °C) Temporal 78 18 98 % 245 lb (111.1 kg)   05/12/21 1312 -- 97.3 °F (36.3 °C) -- 78 -- 97 % --       Oxygen Saturation Interpretation: Normal    ------------------------------------------ PROGRESS NOTES ------------------------------------------    Counseling:  I have spoken with the patient and discussed todays results, in addition to providing specific details for the plan of care and counseling regarding the diagnosis and prognosis. Their questions are answered at this time and they are agreeable with the plan of admission.    --------------------------------- ADDITIONAL PROVIDER NOTES ---------------------------------  Consultations:  Time: 5456. Spoke with Dr. Margarita Rogel. Discussed case. They will admit the patient. This patient's ED course included: a personal history and physicial examination, re-evaluation prior to disposition, multiple bedside re-evaluations, IV medications, cardiac monitoring and continuous pulse oximetry    This patient has remained hemodynamically stable during their ED course. Diagnosis:  1. Wound infection        Disposition:  Patient's disposition: Admit to med/surg floor  Patient's condition is stable.              Stephanie Chua MD  Resident  05/12/21 2173

## 2021-05-13 LAB
ANION GAP SERPL CALCULATED.3IONS-SCNC: 8 MMOL/L (ref 7–16)
BUN BLDV-MCNC: 20 MG/DL (ref 6–23)
C-REACTIVE PROTEIN: 2.9 MG/DL (ref 0–0.4)
CALCIUM SERPL-MCNC: 9.1 MG/DL (ref 8.6–10.2)
CHLORIDE BLD-SCNC: 99 MMOL/L (ref 98–107)
CO2: 30 MMOL/L (ref 22–29)
CREAT SERPL-MCNC: 1.7 MG/DL (ref 0.7–1.2)
EKG ATRIAL RATE: 75 BPM
EKG P AXIS: 23 DEGREES
EKG P-R INTERVAL: 322 MS
EKG Q-T INTERVAL: 370 MS
EKG QRS DURATION: 88 MS
EKG QTC CALCULATION (BAZETT): 413 MS
EKG T AXIS: -22 DEGREES
EKG VENTRICULAR RATE: 75 BPM
GFR AFRICAN AMERICAN: 49
GFR NON-AFRICAN AMERICAN: 49 ML/MIN/1.73
GLUCOSE BLD-MCNC: 227 MG/DL (ref 74–99)
HBA1C MFR BLD: 10.5 % (ref 4–5.6)
HCT VFR BLD CALC: 36.1 % (ref 37–54)
HEMOGLOBIN: 11.5 G/DL (ref 12.5–16.5)
MCH RBC QN AUTO: 25.4 PG (ref 26–35)
MCHC RBC AUTO-ENTMCNC: 31.9 % (ref 32–34.5)
MCV RBC AUTO: 79.9 FL (ref 80–99.9)
METER GLUCOSE: 131 MG/DL (ref 74–99)
METER GLUCOSE: 164 MG/DL (ref 74–99)
METER GLUCOSE: 178 MG/DL (ref 74–99)
METER GLUCOSE: 238 MG/DL (ref 74–99)
PDW BLD-RTO: 14.4 FL (ref 11.5–15)
PLATELET # BLD: 236 E9/L (ref 130–450)
PMV BLD AUTO: 11.4 FL (ref 7–12)
POTASSIUM SERPL-SCNC: 4.1 MMOL/L (ref 3.5–5)
PREALBUMIN: 18 MG/DL (ref 20–40)
RBC # BLD: 4.52 E12/L (ref 3.8–5.8)
SEDIMENTATION RATE, ERYTHROCYTE: 65 MM/HR (ref 0–15)
SODIUM BLD-SCNC: 137 MMOL/L (ref 132–146)
WBC # BLD: 9.6 E9/L (ref 4.5–11.5)

## 2021-05-13 PROCEDURE — 2580000003 HC RX 258: Performed by: SPECIALIST

## 2021-05-13 PROCEDURE — 82962 GLUCOSE BLOOD TEST: CPT

## 2021-05-13 PROCEDURE — 6370000000 HC RX 637 (ALT 250 FOR IP): Performed by: SPECIALIST

## 2021-05-13 PROCEDURE — 6360000002 HC RX W HCPCS: Performed by: SURGERY

## 2021-05-13 PROCEDURE — 80048 BASIC METABOLIC PNL TOTAL CA: CPT

## 2021-05-13 PROCEDURE — 93010 ELECTROCARDIOGRAM REPORT: CPT | Performed by: INTERNAL MEDICINE

## 2021-05-13 PROCEDURE — 2580000003 HC RX 258: Performed by: SURGERY

## 2021-05-13 PROCEDURE — 85651 RBC SED RATE NONAUTOMATED: CPT

## 2021-05-13 PROCEDURE — 1200000000 HC SEMI PRIVATE

## 2021-05-13 PROCEDURE — 85027 COMPLETE CBC AUTOMATED: CPT

## 2021-05-13 PROCEDURE — 83036 HEMOGLOBIN GLYCOSYLATED A1C: CPT

## 2021-05-13 PROCEDURE — 6370000000 HC RX 637 (ALT 250 FOR IP): Performed by: SURGERY

## 2021-05-13 PROCEDURE — 86140 C-REACTIVE PROTEIN: CPT

## 2021-05-13 PROCEDURE — 84134 ASSAY OF PREALBUMIN: CPT

## 2021-05-13 PROCEDURE — 36415 COLL VENOUS BLD VENIPUNCTURE: CPT

## 2021-05-13 RX ORDER — HEPARIN SODIUM (PORCINE) LOCK FLUSH IV SOLN 100 UNIT/ML 100 UNIT/ML
3 SOLUTION INTRAVENOUS EVERY 12 HOURS SCHEDULED
Status: DISCONTINUED | OUTPATIENT
Start: 2021-05-13 | End: 2021-05-20 | Stop reason: HOSPADM

## 2021-05-13 RX ORDER — HEPARIN SODIUM (PORCINE) LOCK FLUSH IV SOLN 100 UNIT/ML 100 UNIT/ML
3 SOLUTION INTRAVENOUS PRN
Status: DISCONTINUED | OUTPATIENT
Start: 2021-05-13 | End: 2021-05-20 | Stop reason: HOSPADM

## 2021-05-13 RX ORDER — SODIUM CHLORIDE 9 MG/ML
25 INJECTION, SOLUTION INTRAVENOUS PRN
Status: DISCONTINUED | OUTPATIENT
Start: 2021-05-13 | End: 2021-05-20 | Stop reason: HOSPADM

## 2021-05-13 RX ORDER — LIDOCAINE HYDROCHLORIDE 10 MG/ML
5 INJECTION, SOLUTION EPIDURAL; INFILTRATION; INTRACAUDAL; PERINEURAL ONCE
Status: DISCONTINUED | OUTPATIENT
Start: 2021-05-13 | End: 2021-05-20 | Stop reason: HOSPADM

## 2021-05-13 RX ORDER — SODIUM CHLORIDE 0.9 % (FLUSH) 0.9 %
5-40 SYRINGE (ML) INJECTION PRN
Status: DISCONTINUED | OUTPATIENT
Start: 2021-05-13 | End: 2021-05-20 | Stop reason: HOSPADM

## 2021-05-13 RX ORDER — SODIUM CHLORIDE 0.9 % (FLUSH) 0.9 %
5-40 SYRINGE (ML) INJECTION EVERY 12 HOURS SCHEDULED
Status: DISCONTINUED | OUTPATIENT
Start: 2021-05-13 | End: 2021-05-20 | Stop reason: HOSPADM

## 2021-05-13 RX ORDER — LACTOBACILLUS RHAMNOSUS GG 10B CELL
1 CAPSULE ORAL DAILY
Status: DISCONTINUED | OUTPATIENT
Start: 2021-05-13 | End: 2021-05-20 | Stop reason: HOSPADM

## 2021-05-13 RX ADMIN — OXYCODONE HYDROCHLORIDE 10 MG: 10 TABLET, FILM COATED, EXTENDED RELEASE ORAL at 10:04

## 2021-05-13 RX ADMIN — METOPROLOL TARTRATE 25 MG: 25 TABLET, FILM COATED ORAL at 20:42

## 2021-05-13 RX ADMIN — AMLODIPINE BESYLATE 10 MG: 10 TABLET ORAL at 10:05

## 2021-05-13 RX ADMIN — INSULIN LISPRO 3 UNITS: 100 INJECTION, SOLUTION INTRAVENOUS; SUBCUTANEOUS at 12:26

## 2021-05-13 RX ADMIN — INSULIN LISPRO 2 UNITS: 100 INJECTION, SOLUTION INTRAVENOUS; SUBCUTANEOUS at 20:45

## 2021-05-13 RX ADMIN — GABAPENTIN 800 MG: 400 CAPSULE ORAL at 17:56

## 2021-05-13 RX ADMIN — HYDROMORPHONE HYDROCHLORIDE 0.5 MG: 1 INJECTION, SOLUTION INTRAMUSCULAR; INTRAVENOUS; SUBCUTANEOUS at 03:51

## 2021-05-13 RX ADMIN — MIRTAZAPINE 7.5 MG: 15 TABLET, FILM COATED ORAL at 20:41

## 2021-05-13 RX ADMIN — CLONIDINE HYDROCHLORIDE 0.1 MG: 0.1 TABLET ORAL at 10:04

## 2021-05-13 RX ADMIN — METOPROLOL TARTRATE 25 MG: 25 TABLET, FILM COATED ORAL at 10:05

## 2021-05-13 RX ADMIN — OXYCODONE 5 MG: 5 TABLET ORAL at 10:03

## 2021-05-13 RX ADMIN — GABAPENTIN 800 MG: 400 CAPSULE ORAL at 20:42

## 2021-05-13 RX ADMIN — INSULIN LISPRO 6 UNITS: 100 INJECTION, SOLUTION INTRAVENOUS; SUBCUTANEOUS at 10:09

## 2021-05-13 RX ADMIN — DULOXETINE HYDROCHLORIDE 120 MG: 60 CAPSULE, DELAYED RELEASE ORAL at 10:05

## 2021-05-13 RX ADMIN — CLONIDINE HYDROCHLORIDE 0.1 MG: 0.1 TABLET ORAL at 20:44

## 2021-05-13 RX ADMIN — CHLORTHALIDONE 25 MG: 25 TABLET ORAL at 10:06

## 2021-05-13 RX ADMIN — GABAPENTIN 800 MG: 400 CAPSULE ORAL at 10:06

## 2021-05-13 RX ADMIN — HYDRALAZINE HYDROCHLORIDE 25 MG: 25 TABLET, FILM COATED ORAL at 10:06

## 2021-05-13 RX ADMIN — HYDRALAZINE HYDROCHLORIDE 25 MG: 25 TABLET, FILM COATED ORAL at 20:45

## 2021-05-13 RX ADMIN — OXYCODONE 5 MG: 5 TABLET ORAL at 18:09

## 2021-05-13 RX ADMIN — Medication 1 CAPSULE: at 14:32

## 2021-05-13 RX ADMIN — CILOSTAZOL 50 MG: 50 TABLET ORAL at 10:06

## 2021-05-13 RX ADMIN — HYDRALAZINE HYDROCHLORIDE 25 MG: 25 TABLET, FILM COATED ORAL at 14:32

## 2021-05-13 RX ADMIN — OXYCODONE HYDROCHLORIDE 10 MG: 10 TABLET, FILM COATED, EXTENDED RELEASE ORAL at 20:42

## 2021-05-13 RX ADMIN — HYDROMORPHONE HYDROCHLORIDE 0.5 MG: 1 INJECTION, SOLUTION INTRAMUSCULAR; INTRAVENOUS; SUBCUTANEOUS at 12:36

## 2021-05-13 RX ADMIN — Medication 10 ML: at 10:15

## 2021-05-13 RX ADMIN — CILOSTAZOL 50 MG: 50 TABLET ORAL at 20:41

## 2021-05-13 RX ADMIN — INSULIN LISPRO 15 UNITS: 100 INJECTION, SOLUTION INTRAVENOUS; SUBCUTANEOUS at 17:56

## 2021-05-13 RX ADMIN — Medication 10 ML: at 20:56

## 2021-05-13 RX ADMIN — GABAPENTIN 800 MG: 400 CAPSULE ORAL at 14:31

## 2021-05-13 RX ADMIN — Medication 10 ML: at 20:58

## 2021-05-13 ASSESSMENT — PAIN SCALES - GENERAL
PAINLEVEL_OUTOF10: 6
PAINLEVEL_OUTOF10: 10
PAINLEVEL_OUTOF10: 8
PAINLEVEL_OUTOF10: 10

## 2021-05-13 ASSESSMENT — PAIN DESCRIPTION - PAIN TYPE
TYPE: ACUTE PAIN
TYPE: ACUTE PAIN

## 2021-05-13 ASSESSMENT — PAIN DESCRIPTION - PROGRESSION: CLINICAL_PROGRESSION: NOT CHANGED

## 2021-05-13 ASSESSMENT — PAIN DESCRIPTION - FREQUENCY
FREQUENCY: CONTINUOUS

## 2021-05-13 ASSESSMENT — PAIN DESCRIPTION - DESCRIPTORS
DESCRIPTORS: ACHING
DESCRIPTORS: ACHING

## 2021-05-13 ASSESSMENT — PAIN DESCRIPTION - ONSET
ONSET: ON-GOING
ONSET: ON-GOING

## 2021-05-13 NOTE — PROGRESS NOTES
1003-Assessment complete and charted patient awake in bed medicated for left hip pain left hip dressing intact call light within reach will continue to monitor.

## 2021-05-13 NOTE — CONSULTS
[address] [place] [address] [facility]    Consultation Note     Admit Date: 5/12/2021  1:52 PM    Reason for Consult:       Attending Physician:  Ranjan Castro, *     Chief Complaint: Continual drainage post amputation from the right femur laterally    HISTORY OF PRESENT ILLNESS:   The patient is a 63 y.o. black man known to the Infectious Diseases service. The patient is admitted for definitive treatment of the right AKA continual drainage from the lateral aspect of the femur. Apparently the incision line has healed but then builds up fluid and then drains and discharges. This happened several times today patient will be going to surgery for further I&D exploration and definitive treatment. I recently saw him on 6/16/2020. At that time he had a surgical wound infection and dehiscence of both the knee amputation. Surgery was originally on 428 again on 430 and excision of prosthetic right lower extremity bypass graft and ligation of the superior femoral artery. He has had E. coli and Klebsiella and Pseudomonas at the surgical wound sites prior to this final amputation. He is also growing MRSA. Unfortunately has had C. difficile in the past as well. The MRSA was treated with Zyvox short course but the area that was an issue was not in his current location but more anterior and in the middle of the incision line.   Subsequently was lost to follow-up from infectious disease point of view      Microbiology:  10/15/2020 MRSA tissue + anaerobic gram-negative rods  8/26/2020 MRSA wound  6/14/2020 MRSA wound and thigh  5/18/2020 C. difficile  4/21/2020 E. coli Pseudomonas and Klebsiella  Past Medical History:        Diagnosis Date    Acute kidney injury ([address] 5/18/2020    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 ([address] 5/22/2018    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 ([address] 5/21/2018    Cellulitis, scrotum 3/20/2020  Critical lower limb ischemia 3/20/2020    Diabetes mellitus ([facility]     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 2 diabetes mellitus, with fat layer exposed ([address] 5/22/2018    Disruption of closure of muscle or muscle flap, sequela 8/26/2020    DVT, lower extremity ([address]     right leg     Encounter for dental examination and cleaning with abnormal findings 4/2/2018    Gas gangrene of thigh ([facility] 3/20/2020    History of DVT (deep vein thrombosis) 7/31/2018    Hyperglycemia due to type 2 diabetes mellitus ([address] 3/20/2020    Hyperlipidemia     Hypertension     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[facility]     Osteomyelitis ([address] 10/24/2018    PVD (peripheral vascular disease) ([facility]      Past Surgical History:        Procedure Laterality Date    AMPUTATION ABOVE KNEE Right 4/28/2020    DEBRIDEMENT OF AMPUTATION RIGHT ABOVE KNEE performed by Delio Johnson MD at [address] [place] [address] Right 4/30/2020    DEBRIDEMENT OF AMPUTATION RIGHT ABOVE KNEE,  POSS. ABOVE KNEE REVISION, POSS. CLOSURE, POSS.WOUND VAC -- BEN Mary / Liset Salcido TO LOOK IN performed by Delio Johnson MD at [place] [address] Right 3/20/2020    RIGHT LOWER EXTREMITY THROMBECTOMY, POSSIBLE ANGIOGRAM, POSSIBLE INTERVENTION, POSSIBLE BYPASS. performed by Delio Johnson MD at [address] [place] [address] Right 4/17/2020    RIGHT LEG DEBRIDEMENT INCISION AND DRAINAGE performed by David Conn MD at [place] [address] Right 4/20/2020    RIGHT THIGH WOUND DRESSING CHANGE; DEBRIDEMENT, removal of muscle performed by David Conn MD at [address] [place] [address] Right 4/21/2020    RIGHT THIGH WOUND DRESSING CHANGE POSSIBLE  DEBRIDEMENT - NEEDS BEN Mary / JANETTE TO SEE performed by Yohan Khanna MD at [address] Right 4/23/2020    RIGHT THIGH WOUND DRESSING CHANGE and DEBRIDEMENT performed by Delio Johnson MD at [facility] OR    LEG SURGERY Right 10/15/2020    DEBRIDEMENT RIGHT ABOVE KNEE AMPUTATION POSS. REVISION POSS. WOUND VAC performed by Guerrero Humphreys MD at [address] Right 12/17/2020    DEBRIDEMENT  RIGHT ABOVE KNEE AMPUTATION WITH POSS.  ADVANCED SKIN THERAPY performed by Guerrero Humphreys MD at [address] Right 11/1/2018    AMPUTATION ABOVE KNEE RIGHT LEG performed by Guerrero Humphreys MD at [facility] Right 5/24/2018    RIGHT FOOT INCISION AND DRAINAGE WITH PARTIAL BONE RESECTION performed by Eduardo Carpenter DPM at [address] [facility] OFFICE/OUTPT VISIT,PROCEDURE ONLY Right 8/3/2018    INCISION AND DRAINAGE MULTIPLE AREAS RIGHT FOOT WITH DEBRIDEMENT SOFT TISSUE performed by Eduardo Carpenter DPM at [facility] Right     leg      Current Medications:   Scheduled Meds:   diphenhydrAMINE  25 mg Intravenous Once    cloNIDine  0.1 mg Oral TID    cilostazol  50 mg Oral BID    amLODIPine  10 mg Oral Daily    hydrALAZINE  25 mg Oral TID    gabapentin  800 mg Oral 4x Daily    metoprolol tartrate  25 mg Oral BID    insulin lispro  15 Units Subcutaneous TID AC    oxyCODONE  10 mg Oral 2 times per day    chlorthalidone  25 mg Oral Daily    mirtazapine  7.5 mg Oral Nightly    DULoxetine  120 mg Oral Daily    sodium chloride flush  5-40 mL Intravenous 2 times per day    enoxaparin  40 mg Subcutaneous Daily    insulin lispro  0-18 Units Subcutaneous TID WC    insulin lispro  0-9 Units Subcutaneous Nightly    vancomycin  1,000 mg Intravenous See Admin Instructions    cefepime  2,000 mg Intravenous 30 Min Pre-Op     Continuous Infusions:   sodium chloride      lactated ringers 100 mL/hr at 05/12/21 2327    dextrose       PRN Meds:oxyCODONE, sodium chloride flush, sodium chloride, promethazine **OR** ondansetron, HYDROmorphone, glucose, dextrose, glucagon (rDNA), dextrose    Allergies:  Patient has no known allergies. Social History:   Social History     Socioeconomic History    Marital status: Single     Spouse name: Not on file    Number of children: Not on file    Years of education: Not on file    Highest education level: Not on file   Occupational History    Not on file   Social Needs    Financial resource strain: Not on file    Food insecurity     Worry: Not on file     Inability: Not on file    Transportation needs     Medical: Not on file     Non-medical: Not on file   Tobacco Use    Smoking status: Current Every Day Smoker     Packs/day: 0.50     Years: 7.00     Pack years: 3.50     Types: Cigarettes    Smokeless tobacco: Never Used   Substance and Sexual Activity    Alcohol use: No     Frequency: Never    Drug use: No    Sexual activity: Not on file   Lifestyle    Physical activity     Days per week: Not on file     Minutes per session: Not on file    Stress: Not on file   Relationships    Social connections     Talks on phone: Not on file     Gets together: Not on file     Attends Zoroastrianism service: Not on file     Active member of club or organization: Not on file     Attends meetings of clubs or organizations: Not on file     Relationship status: Not on file    Intimate partner violence     Fear of current or ex partner: Not on file     Emotionally abused: Not on file     Physically abused: Not on file     Forced sexual activity: Not on file   Other Topics Concern    Not on file   Social History Narrative    Not on file     Tobacco: No  Alcohol: No  Pets: No  Travel: No    Family History:       Problem Relation Age of Onset    Cancer Mother     Diabetes Father     Heart Failure Father     Hypertension Father     Cancer Sister    . Otherwise non-pertinent to the chief complaint. REVIEW OF SYSTEMS:    CONSTITUTIONAL:  No chills, fevers or night sweats. No loss of weight. EYES:  No double vision or drainage from eyes, ears or throat. HEENT:  No neck stiffness. No dysphagia.  No drainage from eyes, ears or throat  RESPIRATORY:  No cough, productive sputum or hemoptysis. CARDIOVASCULAR:  No chest pain, palpitations, orthopnea or dyspnea on exertion. GASTROINTESTINAL:  No nausea, vomiting, diarrhea or constipation or hematochezia   GENITOURINARY:  No frequency burning dysuria or hematuria. INTEGUMENT/BREAST:  No rash or breast masses. HEMATOLOGIC/LYMPHATIC:  No lymphadenopathy or blood dyscrasics. ALLERGIC/IMMUNOLOGIC:  No anaphylaxis. ENDOCRINE:  No polyuria or polydipsia or temperature intolerance. MUSCULOSKELETAL:  No myalgia or arthralgia. Full ROM. AKA with a chronic draining sinus  NEUROLOGICAL:  No focal motor sensory deficit. BEHAVIOR/PSYCH:  No psychosis. PHYSICAL EXAM:    Vitals:    BP (!) 146/85   Pulse 68   Temp 97.6 °F (36.4 °C) (Oral)   Resp 18   Wt 245 lb (111.1 kg)   SpO2 94%   BMI 31.46 kg/m²   Constitutional: The patient is awake, alert, and oriented. Skin: Warm and dry. No rashes were noted. No jaundice. HEENT: Eyes show round, and reactive pupils. Moist mucous membranes, no ulcerations, no thrush. Neck: Supple to movements. No lymphadenopathy. Chest: No use of accessory muscles to breathe. Symmetrical expansion. Auscultation reveals no wheezing, crackles, or rhonchi. Cardiovascular: S1 and S2 are rhythmic and regular. No murmurs appreciated. Abdomen: Positive bowel sounds to auscultation. Benign to palpation. No masses felt. No hepatosplenomegaly. Genitourinary:  Extremities: No clubbing, no cyanosis, no edema.   Musculoskeletal: AKA on the right  Neurological: No focal  Lines: peripheral      CBC+dif:  Recent Labs     05/12/21  1424 05/13/21  0702   WBC 11.9* 9.6   HGB 12.5 11.5*   HCT 38.8 36.1*   MCV 78.2* 79.9*    236   NEUTROABS 7.99*  --      Lab Results   Component Value Date    CRP 2.9 (H) 05/13/2021    CRP 10.6 (H) 10/24/2018    CRP 2.1 (H) 06/25/2018     No results found for: [facility]  Lab Results   Component Value Date SEDRATE 65 (H) 05/13/2021    SEDRATE 128 (H) 10/24/2018    SEDRATE 77 (H) 06/25/2018     Lab Results   Component Value Date    ALT 6 05/12/2021    AST 11 05/12/2021    ALKPHOS 124 05/12/2021    BILITOT 0.2 05/12/2021     Lab Results   Component Value Date     05/13/2021    K 4.1 05/13/2021    K 4.3 05/12/2021    CL 99 05/13/2021    CO2 30 05/13/2021    BUN 20 05/13/2021    CREATININE 1.7 05/13/2021    GFRAA 49 05/13/2021    LABGLOM 49 05/13/2021    GLUCOSE 227 05/13/2021    PROT 8.6 05/12/2021    LABALBU 3.5 05/12/2021    CALCIUM 9.1 05/13/2021    BILITOT 0.2 05/12/2021    ALKPHOS 124 05/12/2021    AST 11 05/12/2021    ALT 6 05/12/2021       Lab Results   Component Value Date    PROTIME 11.3 05/12/2021    INR 1.0 05/12/2021       Lab Results   Component Value Date    TSH 2.270 03/23/2020       Lab Results   Component Value Date    COLORU Yellow 03/20/2020    PHUR 5.5 03/20/2020    WBCUA 0-1 03/20/2020    RBCUA 0-1 03/20/2020    BACTERIA RARE 03/20/2020    CLARITYU Clear 03/20/2020    SPECGRAV <=1.005 03/20/2020    LEUKOCYTESUR Negative 03/20/2020    UROBILINOGEN 0.2 03/20/2020    BILIRUBINUR Negative 03/20/2020    BLOODU LARGE 03/20/2020    GLUCOSEU >=1000 03/20/2020    AMORPHOUS FEW 05/23/2018       Lab Results   Component Value Date    OOO6VLI 22.8 03/20/2020     Radiology:  XR CHEST PORTABLE   Final Result   Mild cardiomegaly. Increased interstitial markings, likely chronic. Atelectatic changes in the right lung base. No other radiographic evidence of acute cardiopulmonary disease. Microbiology:  Pending  No results for input(s): BC in the last 72 hours. No results for input(s): ORG in the last 72 hours. No results for input(s): Eleni [place] in the last 72 hours. No results for input(s): STREPNEUMAGU in the last 72 hours. No results for input(s): LP1UAG in the last 72 hours. No results for input(s): ASO in the last 72 hours.   No results for input(s): CULTRESP in the last 72 hours.    Assessment:  · Suspect this is an MRSA infection associated with deep structures in the residual graft or osteomyelitis    Plan:    · Cont daptomycin plus cefepime pending final cultures  · Lactobacillus  · Discussed with vascular surgery  · Check cultures  · Baseline ESR, CRP  · Monitor labs  · Will follow with you    Thank you for having us see this patient in consultation. I will be discussing this case with the treating physicians.       Electronically signed by Amaury Kingston MD on 5/13/2021 at 12:48 PM

## 2021-05-13 NOTE — PLAN OF CARE
Problem: Skin Integrity:  Goal: Will show no infection signs and symptoms  Description: Will show no infection signs and symptoms  Outcome: Ongoing  Goal: Absence of new skin breakdown  Description: Absence of new skin breakdown  Outcome: Ongoing     Problem: Falls - Risk of:  Goal: Will remain free from falls  Description: Will remain free from falls  5/13/2021 1250 by Pineda Aguilar RN  Outcome: Ongoing  5/13/2021 0119 by Ashley Rowland RN  Outcome: Met This Shift  Goal: Absence of physical injury  Description: Absence of physical injury  5/13/2021 1250 by Pineda Aguilar RN  Outcome: Ongoing  5/13/2021 0119 by Ashley Rowland RN  Outcome: Met This Shift

## 2021-05-13 NOTE — PROGRESS NOTES
Comprehensive Nutrition Assessment    Type and Reason for Visit:  Initial, Positive Nutrition Screen    Nutrition Recommendations/Plan: Start oral diet when medically appropriate    Nutrition Assessment:  Pt adm w/ nonhealing area to AKA stump, pending intervention. Noted hx DM, PVD. Will monitor for nutrition progression    Malnutrition Assessment:  Malnutrition Status:  No malnutrition    Context:  Acute Illness     Findings of the 6 clinical characteristics of malnutrition:  Energy Intake:  Mild decrease in energy intake (Comment)  Weight Loss:  No significant weight loss     Body Fat Loss:  No significant body fat loss     Muscle Mass Loss:  No significant muscle mass loss    Fluid Accumulation:  No significant fluid accumulation     Strength:  Not Performed    Estimated Daily Nutrient Needs:  Energy (kcal):  (15-18 kcal/kg CBW); Weight Used for Energy Requirements:  Current     Protein (g):  120-135(1.5-1.8 gm/kg adj IBW); Weight Used for Protein Requirements:  Ideal        Fluid (ml/day):  ; Method Used for Fluid Requirements:  1 ml/kcal      Nutrition Related Findings:  pt alert, soft abd, active BS, R AKA, RLE edema, -I/Os      Wounds:  Open Wounds       Current Nutrition Therapies:    Diet NPO, After Midnight Exceptions are: Ice Chips, Sips with Meds    Anthropometric Measures:  · Height: 6' 2\" (188 cm)  · Current Body Weight: 245 lb (111.1 kg)(stated, UTO updated CBW d/t iso precautions)   · Usual Body Weight: 240 lb (108.9 kg)(actual per EMR 06/2020)     · Adjusted Body Weight: 269.7; Amputation   · Adjusted BMI: 34.6    · BMI Categories: Obese Class 1 (BMI 30.0-34. 9)       Nutrition Diagnosis:   · Increased nutrient needs related to increase demand for energy/nutrients as evidenced by wounds      Nutrition Interventions:   Food and/or Nutrient Delivery:  Continue NPO(advance diet as medically appropriate)  Nutrition Education/Counseling:  Education not indicated   Coordination of Nutrition Care:  Continue to monitor while inpatient    Goals:  Nutrition progression       Nutrition Monitoring and Evaluation:   Food/Nutrient Intake Outcomes:  Diet Advancement/Tolerance  Physical Signs/Symptoms Outcomes:  Biochemical Data, GI Status, Fluid Status or Edema, Nutrition Focused Physical Findings, Skin, Weight     Discharge Planning:     Too soon to determine     Electronically signed by Jose Maria Fonseca MS, RD, LD on 5/13/21 at 2:47 PM EDT    Contact: [facility]

## 2021-05-13 NOTE — HOME CARE
Current with [facility] for SN. Will need KAROLINA orders prior to discharge if appropriate. Olga Palomino LPN, [facility].

## 2021-05-14 ENCOUNTER — APPOINTMENT (OUTPATIENT)
Dept: GENERAL RADIOLOGY | Age: 66
DRG: 498 | End: 2021-05-14
Payer: COMMERCIAL

## 2021-05-14 ENCOUNTER — ANESTHESIA EVENT (OUTPATIENT)
Dept: OPERATING ROOM | Age: 66
DRG: 498 | End: 2021-05-14
Payer: COMMERCIAL

## 2021-05-14 ENCOUNTER — ANESTHESIA (OUTPATIENT)
Dept: OPERATING ROOM | Age: 66
DRG: 498 | End: 2021-05-14
Payer: COMMERCIAL

## 2021-05-14 VITALS — SYSTOLIC BLOOD PRESSURE: 105 MMHG | OXYGEN SATURATION: 94 % | DIASTOLIC BLOOD PRESSURE: 72 MMHG

## 2021-05-14 LAB
ANION GAP SERPL CALCULATED.3IONS-SCNC: 9 MMOL/L (ref 7–16)
BUN BLDV-MCNC: 18 MG/DL (ref 6–23)
CALCIUM SERPL-MCNC: 9.1 MG/DL (ref 8.6–10.2)
CHLORIDE BLD-SCNC: 101 MMOL/L (ref 98–107)
CO2: 27 MMOL/L (ref 22–29)
CREAT SERPL-MCNC: 1.7 MG/DL (ref 0.7–1.2)
GFR AFRICAN AMERICAN: 49
GFR NON-AFRICAN AMERICAN: 49 ML/MIN/1.73
GLUCOSE BLD-MCNC: 217 MG/DL (ref 74–99)
HCT VFR BLD CALC: 36.7 % (ref 37–54)
HEMOGLOBIN: 11.8 G/DL (ref 12.5–16.5)
MCH RBC QN AUTO: 25.5 PG (ref 26–35)
MCHC RBC AUTO-ENTMCNC: 32.2 % (ref 32–34.5)
MCV RBC AUTO: 79.4 FL (ref 80–99.9)
METER GLUCOSE: 165 MG/DL (ref 74–99)
METER GLUCOSE: 202 MG/DL (ref 74–99)
METER GLUCOSE: 202 MG/DL (ref 74–99)
ORGANISM: ABNORMAL
ORGANISM: ABNORMAL
PDW BLD-RTO: 14.6 FL (ref 11.5–15)
PLATELET # BLD: 236 E9/L (ref 130–450)
PMV BLD AUTO: 11.2 FL (ref 7–12)
POTASSIUM SERPL-SCNC: 4.2 MMOL/L (ref 3.5–5)
RBC # BLD: 4.62 E12/L (ref 3.8–5.8)
SODIUM BLD-SCNC: 137 MMOL/L (ref 132–146)
WBC # BLD: 10.6 E9/L (ref 4.5–11.5)
WOUND/ABSCESS: ABNORMAL
WOUND/ABSCESS: ABNORMAL

## 2021-05-14 PROCEDURE — 6370000000 HC RX 637 (ALT 250 FOR IP): Performed by: SURGERY

## 2021-05-14 PROCEDURE — 3700000000 HC ANESTHESIA ATTENDED CARE: Performed by: SURGERY

## 2021-05-14 PROCEDURE — 36415 COLL VENOUS BLD VENIPUNCTURE: CPT

## 2021-05-14 PROCEDURE — 85027 COMPLETE CBC AUTOMATED: CPT

## 2021-05-14 PROCEDURE — 3600000002 HC SURGERY LEVEL 2 BASE: Performed by: SURGERY

## 2021-05-14 PROCEDURE — 2580000003 HC RX 258: Performed by: SURGERY

## 2021-05-14 PROCEDURE — 6360000002 HC RX W HCPCS: Performed by: SPECIALIST

## 2021-05-14 PROCEDURE — 2580000003 HC RX 258: Performed by: NURSE ANESTHETIST, CERTIFIED REGISTERED

## 2021-05-14 PROCEDURE — 7100000000 HC PACU RECOVERY - FIRST 15 MIN: Performed by: SURGERY

## 2021-05-14 PROCEDURE — 88304 TISSUE EXAM BY PATHOLOGIST: CPT

## 2021-05-14 PROCEDURE — 88311 DECALCIFY TISSUE: CPT

## 2021-05-14 PROCEDURE — 2709999900 HC NON-CHARGEABLE SUPPLY: Performed by: SURGERY

## 2021-05-14 PROCEDURE — 3600000012 HC SURGERY LEVEL 2 ADDTL 15MIN: Performed by: SURGERY

## 2021-05-14 PROCEDURE — 6360000002 HC RX W HCPCS: Performed by: SURGERY

## 2021-05-14 PROCEDURE — 80048 BASIC METABOLIC PNL TOTAL CA: CPT

## 2021-05-14 PROCEDURE — 6370000000 HC RX 637 (ALT 250 FOR IP): Performed by: SPECIALIST

## 2021-05-14 PROCEDURE — 6360000002 HC RX W HCPCS

## 2021-05-14 PROCEDURE — 6360000002 HC RX W HCPCS: Performed by: NURSE ANESTHETIST, CERTIFIED REGISTERED

## 2021-05-14 PROCEDURE — 7100000001 HC PACU RECOVERY - ADDTL 15 MIN: Performed by: SURGERY

## 2021-05-14 PROCEDURE — 2500000003 HC RX 250 WO HCPCS: Performed by: SURGERY

## 2021-05-14 PROCEDURE — 2580000003 HC RX 258: Performed by: SPECIALIST

## 2021-05-14 PROCEDURE — 27596 AMPUTATION FOLLOW-UP SURGERY: CPT | Performed by: SURGERY

## 2021-05-14 PROCEDURE — 1200000000 HC SEMI PRIVATE

## 2021-05-14 PROCEDURE — 6360000002 HC RX W HCPCS: Performed by: ANESTHESIOLOGY

## 2021-05-14 PROCEDURE — 82962 GLUCOSE BLOOD TEST: CPT

## 2021-05-14 PROCEDURE — 2500000003 HC RX 250 WO HCPCS: Performed by: NURSE ANESTHETIST, CERTIFIED REGISTERED

## 2021-05-14 PROCEDURE — 71045 X-RAY EXAM CHEST 1 VIEW: CPT

## 2021-05-14 PROCEDURE — 3700000001 HC ADD 15 MINUTES (ANESTHESIA): Performed by: SURGERY

## 2021-05-14 RX ORDER — LIDOCAINE HYDROCHLORIDE 10 MG/ML
INJECTION, SOLUTION INFILTRATION; PERINEURAL PRN
Status: DISCONTINUED | OUTPATIENT
Start: 2021-05-14 | End: 2021-05-14 | Stop reason: ALTCHOICE

## 2021-05-14 RX ORDER — KETAMINE HCL IN NACL, ISO-OSM 100MG/10ML
SYRINGE (ML) INJECTION PRN
Status: DISCONTINUED | OUTPATIENT
Start: 2021-05-14 | End: 2021-05-14 | Stop reason: SDUPTHER

## 2021-05-14 RX ORDER — SODIUM CHLORIDE, SODIUM LACTATE, POTASSIUM CHLORIDE, CALCIUM CHLORIDE 600; 310; 30; 20 MG/100ML; MG/100ML; MG/100ML; MG/100ML
INJECTION, SOLUTION INTRAVENOUS CONTINUOUS PRN
Status: DISCONTINUED | OUTPATIENT
Start: 2021-05-14 | End: 2021-05-14 | Stop reason: SDUPTHER

## 2021-05-14 RX ORDER — BUPIVACAINE HYDROCHLORIDE 2.5 MG/ML
INJECTION, SOLUTION EPIDURAL; INFILTRATION; INTRACAUDAL PRN
Status: DISCONTINUED | OUTPATIENT
Start: 2021-05-14 | End: 2021-05-14 | Stop reason: ALTCHOICE

## 2021-05-14 RX ORDER — FENTANYL CITRATE 50 UG/ML
INJECTION, SOLUTION INTRAMUSCULAR; INTRAVENOUS PRN
Status: DISCONTINUED | OUTPATIENT
Start: 2021-05-14 | End: 2021-05-14 | Stop reason: SDUPTHER

## 2021-05-14 RX ORDER — PROPOFOL 10 MG/ML
INJECTION, EMULSION INTRAVENOUS CONTINUOUS PRN
Status: DISCONTINUED | OUTPATIENT
Start: 2021-05-14 | End: 2021-05-14 | Stop reason: SDUPTHER

## 2021-05-14 RX ORDER — GLYCOPYRROLATE 1 MG/5 ML
SYRINGE (ML) INTRAVENOUS PRN
Status: DISCONTINUED | OUTPATIENT
Start: 2021-05-14 | End: 2021-05-14 | Stop reason: SDUPTHER

## 2021-05-14 RX ADMIN — DULOXETINE HYDROCHLORIDE 120 MG: 60 CAPSULE, DELAYED RELEASE ORAL at 08:22

## 2021-05-14 RX ADMIN — HYDRALAZINE HYDROCHLORIDE 25 MG: 25 TABLET, FILM COATED ORAL at 21:11

## 2021-05-14 RX ADMIN — SODIUM CHLORIDE, POTASSIUM CHLORIDE, SODIUM LACTATE AND CALCIUM CHLORIDE: 600; 310; 30; 20 INJECTION, SOLUTION INTRAVENOUS at 08:36

## 2021-05-14 RX ADMIN — METOPROLOL TARTRATE 25 MG: 25 TABLET, FILM COATED ORAL at 08:22

## 2021-05-14 RX ADMIN — GABAPENTIN 800 MG: 400 CAPSULE ORAL at 08:23

## 2021-05-14 RX ADMIN — HYDROMORPHONE HYDROCHLORIDE 0.5 MG: 1 INJECTION, SOLUTION INTRAMUSCULAR; INTRAVENOUS; SUBCUTANEOUS at 00:12

## 2021-05-14 RX ADMIN — GABAPENTIN 800 MG: 400 CAPSULE ORAL at 21:11

## 2021-05-14 RX ADMIN — INSULIN LISPRO 6 UNITS: 100 INJECTION, SOLUTION INTRAVENOUS; SUBCUTANEOUS at 08:34

## 2021-05-14 RX ADMIN — GABAPENTIN 800 MG: 400 CAPSULE ORAL at 13:56

## 2021-05-14 RX ADMIN — HYDROMORPHONE HYDROCHLORIDE 0.5 MG: 1 INJECTION, SOLUTION INTRAMUSCULAR; INTRAVENOUS; SUBCUTANEOUS at 18:50

## 2021-05-14 RX ADMIN — INSULIN LISPRO 6 UNITS: 100 INJECTION, SOLUTION INTRAVENOUS; SUBCUTANEOUS at 13:12

## 2021-05-14 RX ADMIN — HYDROMORPHONE HYDROCHLORIDE 0.5 MG: 1 INJECTION, SOLUTION INTRAMUSCULAR; INTRAVENOUS; SUBCUTANEOUS at 13:18

## 2021-05-14 RX ADMIN — MIRTAZAPINE 7.5 MG: 15 TABLET, FILM COATED ORAL at 21:12

## 2021-05-14 RX ADMIN — CILOSTAZOL 50 MG: 50 TABLET ORAL at 21:12

## 2021-05-14 RX ADMIN — HYDRALAZINE HYDROCHLORIDE 25 MG: 25 TABLET, FILM COATED ORAL at 13:56

## 2021-05-14 RX ADMIN — HYDROMORPHONE HYDROCHLORIDE 0.5 MG: 1 INJECTION, SOLUTION INTRAMUSCULAR; INTRAVENOUS; SUBCUTANEOUS at 23:06

## 2021-05-14 RX ADMIN — PROPOFOL 100 MCG/KG/MIN: 10 INJECTION, EMULSION INTRAVENOUS at 17:18

## 2021-05-14 RX ADMIN — HYDROMORPHONE HYDROCHLORIDE 0.5 MG: 1 INJECTION, SOLUTION INTRAMUSCULAR; INTRAVENOUS; SUBCUTANEOUS at 06:34

## 2021-05-14 RX ADMIN — CLONIDINE HYDROCHLORIDE 0.1 MG: 0.1 TABLET ORAL at 21:12

## 2021-05-14 RX ADMIN — HYDROMORPHONE HYDROCHLORIDE 0.5 MG: 1 INJECTION, SOLUTION INTRAMUSCULAR; INTRAVENOUS; SUBCUTANEOUS at 09:48

## 2021-05-14 RX ADMIN — Medication 10 ML: at 23:07

## 2021-05-14 RX ADMIN — CHLORTHALIDONE 25 MG: 25 TABLET ORAL at 08:22

## 2021-05-14 RX ADMIN — OXYCODONE HYDROCHLORIDE 10 MG: 10 TABLET, FILM COATED, EXTENDED RELEASE ORAL at 21:11

## 2021-05-14 RX ADMIN — Medication 30 MG: at 17:21

## 2021-05-14 RX ADMIN — AMLODIPINE BESYLATE 10 MG: 10 TABLET ORAL at 08:22

## 2021-05-14 RX ADMIN — CILOSTAZOL 50 MG: 50 TABLET ORAL at 08:36

## 2021-05-14 RX ADMIN — CLONIDINE HYDROCHLORIDE 0.1 MG: 0.1 TABLET ORAL at 13:56

## 2021-05-14 RX ADMIN — Medication 20 MG: at 17:54

## 2021-05-14 RX ADMIN — CLONIDINE HYDROCHLORIDE 0.1 MG: 0.1 TABLET ORAL at 08:22

## 2021-05-14 RX ADMIN — SODIUM CHLORIDE, POTASSIUM CHLORIDE, SODIUM LACTATE AND CALCIUM CHLORIDE: 600; 310; 30; 20 INJECTION, SOLUTION INTRAVENOUS at 17:12

## 2021-05-14 RX ADMIN — OXYCODONE HYDROCHLORIDE 10 MG: 10 TABLET, FILM COATED, EXTENDED RELEASE ORAL at 08:24

## 2021-05-14 RX ADMIN — HYDROMORPHONE HYDROCHLORIDE 0.5 MG: 1 INJECTION, SOLUTION INTRAMUSCULAR; INTRAVENOUS; SUBCUTANEOUS at 18:56

## 2021-05-14 RX ADMIN — SODIUM CHLORIDE, PRESERVATIVE FREE 300 UNITS: 5 INJECTION INTRAVENOUS at 23:06

## 2021-05-14 RX ADMIN — FENTANYL CITRATE 50 MCG: 50 INJECTION, SOLUTION INTRAMUSCULAR; INTRAVENOUS at 17:12

## 2021-05-14 RX ADMIN — HYDRALAZINE HYDROCHLORIDE 25 MG: 25 TABLET, FILM COATED ORAL at 08:23

## 2021-05-14 RX ADMIN — HYDROMORPHONE HYDROCHLORIDE 0.5 MG: 1 INJECTION, SOLUTION INTRAMUSCULAR; INTRAVENOUS; SUBCUTANEOUS at 19:14

## 2021-05-14 RX ADMIN — HYDROMORPHONE HYDROCHLORIDE 0.5 MG: 1 INJECTION, SOLUTION INTRAMUSCULAR; INTRAVENOUS; SUBCUTANEOUS at 19:24

## 2021-05-14 RX ADMIN — FENTANYL CITRATE 50 MCG: 50 INJECTION, SOLUTION INTRAMUSCULAR; INTRAVENOUS at 17:24

## 2021-05-14 RX ADMIN — Medication 1 CAPSULE: at 08:23

## 2021-05-14 RX ADMIN — Medication 0.2 MG: at 17:20

## 2021-05-14 RX ADMIN — VANCOMYCIN HYDROCHLORIDE 1000 MG: 1 INJECTION, POWDER, LYOPHILIZED, FOR SOLUTION INTRAVENOUS at 18:39

## 2021-05-14 RX ADMIN — CEFEPIME 2000 MG: 2 INJECTION, POWDER, FOR SOLUTION INTRAVENOUS at 17:45

## 2021-05-14 ASSESSMENT — PULMONARY FUNCTION TESTS
PIF_VALUE: 1
PIF_VALUE: 0
PIF_VALUE: 1
PIF_VALUE: 0
PIF_VALUE: 1

## 2021-05-14 ASSESSMENT — PAIN DESCRIPTION - DESCRIPTORS
DESCRIPTORS: ACHING;BURNING;CONSTANT

## 2021-05-14 ASSESSMENT — PAIN DESCRIPTION - ORIENTATION
ORIENTATION: RIGHT

## 2021-05-14 ASSESSMENT — PAIN DESCRIPTION - PAIN TYPE
TYPE: ACUTE PAIN
TYPE: SURGICAL PAIN
TYPE: SURGICAL PAIN

## 2021-05-14 ASSESSMENT — LIFESTYLE VARIABLES: SMOKING_STATUS: 1

## 2021-05-14 ASSESSMENT — PAIN DESCRIPTION - LOCATION
LOCATION: HIP

## 2021-05-14 ASSESSMENT — PAIN DESCRIPTION - FREQUENCY
FREQUENCY: CONTINUOUS

## 2021-05-14 ASSESSMENT — PAIN SCALES - GENERAL
PAINLEVEL_OUTOF10: 8
PAINLEVEL_OUTOF10: 10
PAINLEVEL_OUTOF10: 8
PAINLEVEL_OUTOF10: 10
PAINLEVEL_OUTOF10: 7
PAINLEVEL_OUTOF10: 10

## 2021-05-14 ASSESSMENT — PAIN DESCRIPTION - PROGRESSION: CLINICAL_PROGRESSION: NOT CHANGED

## 2021-05-14 NOTE — PROGRESS NOTES
PICC Placement 5/14/2021    Product number: [id number]   Lot Number: [id number]      Ultrasound: yes   Left Basilic vein                Upper Arm Circumference: 41cm    Size: 5.5f    Exposed Length: 4cm    Internal Length: 44cm   Cut: 7cm   Vein Measurement: .60cm    Slime Ndiaye  5/14/2021  12:45 PM

## 2021-05-14 NOTE — BRIEF OP NOTE
Brief Postoperative Note      Patient: Clifford Yanez  YOB: 1955  MRN: [id number]    Date of Procedure: 5/14/2021    Pre-Op Diagnosis: WOUND RT ABOVE KNEE AMPUTATION    Post-Op Diagnosis: Same and Right femur osteomyelitis       Procedure(s):  DEBRIDEMENT RIGHT ABOVE KNEE AMPUTATION    Surgeon(s):  Barb Garcia MD    Assistant:  Resident: Rui Bhandari MD    Anesthesia: None    Estimated Blood Loss (mL): 859 mL    Complications: None    Specimens:   ID Type Source Tests Collected by Time Destination   A : RIGHT FEMUR Bone Bone SURGICAL PATHOLOGY Barb Garcia MD 5/14/2021 1811        Implants:  * No implants in log *      Drains: * No LDAs found *    Findings: Purulent drainage from sinus tract tract down to bone, area of soft bone concerning for osteomyelitis, resection of 6 cm of distal femur, open wound    Electronically signed by Rui Bhandari MD on 5/14/2021 at 7:42 PM

## 2021-05-14 NOTE — PROGRESS NOTES
97.8 °F (36.6 °C)  Min: 97.2 °F (36.2 °C)  Max: 98.9 °F (37.2 °C)  Constitutional: The patient is awake, alert, and oriented. Skin: Warm and dry. No rashes were noted. HEENT: Round and reactive pupils. Moist mucous membranes. No ulcerations or thrush. Neck: Supple to movements. Chest: No use of accessory muscles to breathe. Symmetrical expansion. No wheezing, crackles or rhonchi. Cardiovascular: S1 and S2 are rhythmic and regular. No murmurs appreciated. Abdomen: Positive bowel sounds to auscultation. Benign to palpation. No masses felt. No hepatosplenomegaly. Genitourinary: male  Extremities: No clubbing, no cyanosis, no edema.  right aka  Lines: peripheral    Laboratory and Tests Review:  Lab Results   Component Value Date    WBC 10.6 05/14/2021    WBC 9.6 05/13/2021    WBC 11.9 (H) 05/12/2021    HGB 11.8 (L) 05/14/2021    HCT 36.7 (L) 05/14/2021    MCV 79.4 (L) 05/14/2021     05/14/2021     Lab Results   Component Value Date    NEUTROABS 7.99 (H) 05/12/2021    NEUTROABS 5.33 06/18/2020    NEUTROABS 9.02 (H) 06/14/2020     No results found for: [facility]  Lab Results   Component Value Date    ALT 6 05/12/2021    AST 11 05/12/2021    ALKPHOS 124 05/12/2021    BILITOT 0.2 05/12/2021     Lab Results   Component Value Date     05/13/2021    K 4.1 05/13/2021    K 4.3 05/12/2021    CL 99 05/13/2021    CO2 30 05/13/2021    BUN 20 05/13/2021    CREATININE 1.7 05/13/2021    CREATININE 1.8 05/12/2021    CREATININE 1.9 06/18/2020    GFRAA 49 05/13/2021    LABGLOM 49 05/13/2021    GLUCOSE 227 05/13/2021    PROT 8.6 05/12/2021    LABALBU 3.5 05/12/2021    CALCIUM 9.1 05/13/2021    BILITOT 0.2 05/12/2021    ALKPHOS 124 05/12/2021    AST 11 05/12/2021    ALT 6 05/12/2021     Lab Results   Component Value Date    CRP 2.9 (H) 05/13/2021    CRP 10.6 (H) 10/24/2018    CRP 2.1 (H) 06/25/2018     Lab Results   Component Value Date    SEDRATE 65 (H) 05/13/2021    SEDRATE 128 (H) 10/24/2018    SEDRATE 77 (H) 06/25/2018     Radiology:  Reviewed    Microbiology:   Lab Results   Component Value Date    BC 24 Hours no growth 05/12/2021    BC 5 Days- no growth 04/16/2020    BC 5 Days- no growth 03/20/2020    ORG Staphylococcus aureus 05/12/2021    ORG Corynebacterium species 05/12/2021    ORG Staphylococcus aureus 12/17/2020     Lab Results   Component Value Date    BLOODCULT2 24 Hours no growth 05/12/2021    BLOODCULT2 5 Days- no growth 04/16/2020    BLOODCULT2 5 Days- no growth 03/20/2020    ORG Staphylococcus aureus 05/12/2021    ORG Corynebacterium species 05/12/2021    ORG Staphylococcus aureus 12/17/2020     WOUND/ABSCESS   Date Value Ref Range Status   05/12/2021   Final    Heavy growth  Methicillin resistant Staph aureus isolated. Most Methicillin  resistant Staphylococcus are usually resistant to multiple  antibiotics including other B-Lactams, Aminoglycosides,  Macrolides, Clindamycin and Tetracycline. Contact isolation  is indicated. 05/12/2021 Light growth  Final   08/26/2020   Final    Rare growth  Methicillin resistant Staph aureus isolated. Most Methicillin  resistant Staphylococcus are usually resistant to multiple  antibiotics including other B-Lactams, Aminoglycosides,  Macrolides, Clindamycin and Tetracycline. Contact isolation  is indicated. No results found for: RESPSMEAR      Component Value Date/Time    FUNGSM No Fungal elements seen 12/17/2020 1342    LABFUNG No growth after 4 weeks of incubation. 12/17/2020 1342     No results found for: CULTRESP  No results found for: CXCATHTIP  No results found for: [facility]  Culture Surgical   Date Value Ref Range Status   12/17/2020   Final    Rare growth  Methicillin resistant Staph aureus isolated. Most Methicillin  resistant Staphylococcus are usually resistant to multiple  antibiotics including other B-Lactams, Aminoglycosides,  Macrolides, Clindamycin and Tetracycline. Contact isolation  is indicated.      10/15/2020   Final    Light growth  Methicillin resistant Staph aureus isolated. Most Methicillin  resistant Staphylococcus are usually resistant to multiple  antibiotics including other B-Lactams, Aminoglycosides,  Macrolides, Clindamycin and Tetracycline. Contact isolation  is indicated. 04/21/2020 Moderate growth  Final   04/21/2020 Moderate growth  Final   04/21/2020 Moderate growth  Final     Urine Culture, Routine   Date Value Ref Range Status   04/19/2020 Growth not present  Final   03/20/2020 <10,000 CFU/mL  Gram negative rods   (A)  Final   03/20/2020 <10,000 CFU/ml  Final   03/20/2020 <10,000 CFU/ml  Final     No results found for: [facility]  Wound cx 5/12 MRSA and corynebacterium    Assessment:  · Suspect this is an MRSA infection associated with deep structures in the residual graft or osteomyelitis  · PICC line in place    Plan:    · Cont daptomycin plus cefepime pending final cultures  · Lactobacillus  · Discussed with vascular surgery  · For debridement today-preop antibiotic on board  · Check cultures  · Baseline ESR 65, CRP 2.9  · Monitor labs  · Will follow with you     Thank you for having us see this patient in consultation  Electronically signed by SHONDA Gandara CNP on 5/14/2021 at 9:44 AM     Pt seen and examined. Above discussed agree with advanced practice nurse. Labs, cultures, and radiographs reviewed. Face to Face encounter occurred. Changes made as necessary.      Tomi Howard MD

## 2021-05-14 NOTE — OP NOTE
Operative Note      Patient: Clifford Yanez  YOB: 1955  MRN: [id number]    Date of Procedure: 5/14/2021    Pre-Op Diagnosis: WOUND RT ABOVE KNEE AMPUTATION    Post-Op Diagnosis: Same and Right femur osteomyelitis       Procedures:  Revision AKA    Surgeon(s):  Deana Correia MD    Assistant:   Resident: Rui Bhandari MD    Anesthesia: None    Estimated Blood Loss (mL): 480 mL    Complications: None    Specimens:   ID Type Source Tests Collected by Time Destination   A : RIGHT FEMUR Bone Bone SURGICAL PATHOLOGY Deana Correia MD 5/14/2021 1811        Implants:  * No implants in log *      Drains: * No LDAs found *    Findings: Purulent drainage from sinus tract tract down to bone, area of soft bone concerning for osteomyelitis, resection of 6 cm of distal femur, open wound    Detailed Description of Procedure:     Patient was brought to the operating room and remained on the bed. Local monitored anesthesia care was provided by anesthesia per their records. Preoperative antibiotics were given. The right lower extremity stump was prepped with Betadine and draped in usual sterile fashion. The previous incisions were noted and there was an area of open drainage with the previous incisions noted to be tracking through this area. The previous incision sites were localized with 1% lidocaine 0.5% Marcaine with epinephrine. 10 blade scalpel was used to cut through the dermis down to the subcutaneous fat along the incision point through the tract this extended superiormedial the and inferiorlaterally to the draining tract. This incision was taken down with electrocautery to the femur following along the tract. This area was full of scar tissue that was quite difficult to dissect. Once we reached the bone there is an area of degraded bone concerning for osteomyelitis.   The femur was dissected away from the surrounding tissue all the way up to the edge of the femur stump and proximal to the area of concern. The Gigli saw was attempted however the portion of the Gigli saw broke about care home through the bone. At this time the bone saw was used to excise approximately 6 cm of the meaning distal femur, this was sent to pathology for further analysis. The area was inspected and hemostasis was achieved. The decision was made at this point time to pack the wound with wet Curlex cover with heavy drainage pack and to take the patient back at a further date and assess what coverage of the bone could be accomplished. Dr. Lachelle Brito is present and scrubbed throughout the entire procedure. Needle instrument and sponge count were reported as correct x2. Patient tolerated procedure well was taken to the postanesthesia care unit return to the floor.     Electronically signed by Gerard Dakin, MD on 5/14/2021 at 7:35 PM

## 2021-05-14 NOTE — ANESTHESIA PRE PROCEDURE
Department of Anesthesiology  Preprocedure Note       Name:  Denice Bravo   Age:  63 y.o.  :  1955                                          MRN:  [id number]         Date:  2021      Surgeon: Nicholas Murray):  Myra Butts MD    Procedure: Procedure(s):  DEBRIDEMENT RIGHT ABOVE KNEE AMPUTATION    Medications prior to admission:   Prior to Admission medications    Medication Sig Start Date End Date Taking? Authorizing Provider   DULoxetine (CYMBALTA) 60 MG extended release capsule TAKE TWO CAPSULES BY MOUTH EVERY MORNING 5/3/21   Juanito Kline, DO   chlorthalidone (HYGROTON) 25 MG tablet TAKE 1 TABLET BY MOUTH ONE TIME A DAY 21   Juanito Kline, DO   mirtazapine (REMERON) 7.5 MG tablet TAKE 1 TABLET BY MOUTH ONE TIME A DAY NIGHTLY 21   Juanito Kline,    oxyCODONE (OXYCONTIN) 10 MG extended release tablet Take 1 tablet by mouth every 12 hours as needed for Pain for up to 30 days. Patient taking differently: Take 10 mg by mouth every 6 hours as needed for Pain. 21  Juanito Kline,    insulin lispro (HUMALOG) 100 UNIT/ML injection vial Inject 15 Units into the skin 3 times daily (before meals)  Patient taking differently: Inject 12 Units into the skin 3 times daily (before meals) For every 20 units above 200 mg/dL patient takes an additional 2 units 21   Juanito Kline,    oxyCODONE (ROXICODONE) 5 MG immediate release tablet Take 1 tablet by mouth every 4-6 hours as needed for Pain for up to 30 days. Patient taking differently: Take 5 mg by mouth every 4 hours as needed for Pain. 4/15/21 5/15/21  Juanito Kline,    ammonium lactate (LAC-HYDRIN) 12 % lotion Apply topically as needed. 21   Juanito Kline, DO   metoprolol tartrate (LOPRESSOR) 25 MG tablet Take 1 tablet by mouth 2 times daily 21   Juanito Kline,    gabapentin (NEURONTIN) 800 MG tablet Take 1 tablet by mouth 4 times daily for 30 days.  21  Juanito Kline,    hydrALAZINE (APRESOLINE) 25 MG tablet Take 1 tablet by mouth 3 times daily 1/26/21   Juanito Kline, DO   amLODIPine (NORVASC) 10 MG tablet Take 1 tablet by mouth daily 1/25/21   Juanito Kline, DO   cloNIDine (CATAPRES) 0.1 MG tablet TAKE ONE TABLET BY MOUTH THREE TIMES DAILY 1/15/21   Juanito Kline, DO   cilostazol (PLETAL) 50 MG tablet Take 1 tablet by mouth 2 times daily 1/15/21   Juanito Kline, DO   mineral oil-hydrophilic petrolatum (AQUAPHOR) ointment Apply topically twice daily dispense 14 ounce jar  Patient taking differently: Apply topically once a week  12/14/20   Juanito Kline, DO   blood glucose test strips (ONETOUCH ULTRA) strip 1 each by Other route 3 times daily As needed. 12/4/20   Juanito Kline, DO   Insulin Syringe-Needle U-100 (ULTICARE INSULIN SYRINGE) 31G X 5/16\" 0.3 ML MISC 1 each by Other route 5 times daily 12/4/20   Juanito Kline, DO   Handicap Placard MISC by Does not apply route Patient cannot walk 200 ft without stopping to rest.    Expiration 10/21/2024 10/21/19   Juanito Kline, DO       Current medications:    No current facility-administered medications for this visit. No current outpatient medications on file.      Facility-Administered Medications Ordered in Other Visits   Medication Dose Route Frequency Provider Last Rate Last Admin    lactobacillus (CULTURELLE) capsule 1 capsule  1 capsule Oral Daily Cass Caldera MD   1 capsule at 05/14/21 0823    lidocaine PF 1 % injection 5 mL  5 mL Intradermal Once Cass Caldera MD        sodium chloride flush 0.9 % injection 5-40 mL  5-40 mL Intravenous 2 times per day Cass Caldera MD   10 mL at 05/13/21 2056    sodium chloride flush 0.9 % injection 5-40 mL  5-40 mL Intravenous PRN Cass Caldera MD        0.9 % sodium chloride infusion  25 mL Intravenous PRN Cass Caldera MD        heparin flush 100 UNIT/ML injection 300 Units  3 mL Intravenous 2 times per day Cass Caldera MD        heparin flush 100 UNIT/ML injection 300 Units  3 mL Intracatheter PRN Rea Lyman MD        diphenhydrAMINE (BENADRYL) injection 25 mg  25 mg Intravenous Once Joanne Yarsani, DO        cloNIDine (CATAPRES) tablet 0.1 mg  0.1 mg Oral TID Lakeisha Arteaga MD   0.1 mg at 05/14/21 1356    cilostazol (PLETAL) tablet 50 mg  50 mg Oral BID Lakeisha Arteaga MD   50 mg at 05/14/21 0836    amLODIPine (NORVASC) tablet 10 mg  10 mg Oral Daily Lakeisha Arteaga MD   10 mg at 05/14/21 2966    hydrALAZINE (APRESOLINE) tablet 25 mg  25 mg Oral TID Lakeisha Arteaga MD   25 mg at 05/14/21 1356    gabapentin (NEURONTIN) capsule 800 mg  800 mg Oral 4x Daily Lakeisha Arteaga MD   800 mg at 05/14/21 1356    metoprolol tartrate (LOPRESSOR) tablet 25 mg  25 mg Oral BID Lakeisha Arteaga MD   25 mg at 05/14/21 7376    oxyCODONE (ROXICODONE) immediate release tablet 5 mg  5 mg Oral Q4H PRN Lakeisha Arteaga MD   5 mg at 05/13/21 1809    insulin lispro (HUMALOG) injection vial 15 Units  15 Units Subcutaneous TID AC Lakeisha Arteaga MD   15 Units at 05/13/21 1756    oxyCODONE (OXYCONTIN) extended release tablet 10 mg  10 mg Oral 2 times per day Lakeisha Arteaga MD   10 mg at 05/14/21 0824    chlorthalidone (HYGROTON) tablet 25 mg  25 mg Oral Daily Lakeisha Arteaga MD   25 mg at 05/14/21 3614    mirtazapine (REMERON) tablet 7.5 mg  7.5 mg Oral Nightly Lakeisha Arteaga MD   7.5 mg at 05/13/21 2041    DULoxetine (CYMBALTA) extended release capsule 120 mg  120 mg Oral Daily Lakeisha Arteaga MD   120 mg at 05/14/21 7118    sodium chloride flush 0.9 % injection 5-40 mL  5-40 mL Intravenous 2 times per day Lakeisha Arteaga MD   10 mL at 05/13/21 2058    sodium chloride flush 0.9 % injection 5-40 mL  5-40 mL Intravenous PRN Lakeisha Arteaga MD        0.9 % sodium chloride infusion  25 mL Intravenous PRN Lakeisha Arteaga MD       [facility] promethazine (PHENERGAN) tablet 12.5 mg  12.5 mg Oral Q6H PRN Karen Tim MD        Or    ondansetron [facility]) injection 4 mg  4 mg Intravenous Q6H PRN Karen Tim MD        enoxaparin (LOVENOX) injection 40 mg  40 mg Subcutaneous Daily Karen Tim MD        lactated ringers infusion   Intravenous Continuous Karen Tim  mL/hr at 05/14/21 0836 New Bag at 05/14/21 0836    HYDROmorphone (DILAUDID) injection 0.5 mg  0.5 mg Intravenous Q3H PRN Karen Tim MD   0.5 mg at 05/14/21 1318    insulin lispro (HUMALOG) injection vial 0-18 Units  0-18 Units Subcutaneous TID WC Karen Tim MD   6 Units at 05/14/21 1312    insulin lispro (HUMALOG) injection vial 0-9 Units  0-9 Units Subcutaneous Nightly Karen Tim MD   2 Units at 05/13/21 2045    vancomycin 1000 mg IVPB in 250 mL D5W addavial  1,000 mg Intravenous See Admin Instructions Karen Tim MD        cefepime (MAXIPIME) 2000 mg IVPB minibag  2,000 mg Intravenous 30 Min Pre-Op Karen Tim MD        glucose (GLUTOSE) 40 % oral gel 15 g  15 g Oral PRN Karen Tim MD        dextrose 50 % IV solution  12.5 g Intravenous PRN Karen Tim MD        glucagon (rDNA) injection 1 mg  1 mg Intramuscular PRN Karen Tim MD        dextrose 5 % solution  100 mL/hr Intravenous PRN Karen Tim MD           Allergies:  No Known Allergies    Problem List:    Patient Active Problem List   Diagnosis Code    DM II (diabetes mellitus, type II), controlled ([address] E11.9    HTN (hypertension) I10    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 8XXA, L08.9    Postoperative wound infection T81.49XA    Ischemic ulcer of right thigh with fat layer exposed ([address] L97.112    Ischemic ulcer of right thigh with necrosis of muscle ([address] L97.113    Above knee amputation of right lower extremity ([facility]) X79.195K    Postoperative pain G89.18    Disruption of closure of muscle or muscle flap, sequela T81.32XS    Chronic pain syndrome G89.4    AKA stump complication ([facility]) Q16.5       Past Medical History:        Diagnosis Date    Acute kidney injury ([address] 5/18/2020    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 ([address] 5/22/2018    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 ([address] 5/21/2018    Cellulitis, scrotum 3/20/2020    Critical lower limb ischemia 3/20/2020    Diabetes mellitus ([address]     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 2 diabetes mellitus, with fat layer exposed ([address] 5/22/2018    Disruption of closure of muscle or muscle flap, sequela 8/26/2020    DVT, lower extremity ([address]     right leg     Encounter for dental examination and cleaning with abnormal findings 4/2/2018    Gas gangrene of thigh ([address] 3/20/2020    History of DVT (deep vein thrombosis) 7/31/2018    Hyperglycemia due to type 2 diabetes mellitus ([address] 3/20/2020    Hyperlipidemia     Hypertension     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[address]     Osteomyelitis ([address] 10/24/2018    PVD (peripheral vascular disease) ([address]        Past Surgical History:        Procedure Laterality Date    AMPUTATION ABOVE KNEE Right 4/28/2020    DEBRIDEMENT OF AMPUTATION RIGHT ABOVE KNEE performed by Rosa Arana MD at [address] [place] [address] Smoker     Packs/day: 0.50     Years: 7.00     Pack years: 3.50     Types: Cigarettes    Smokeless tobacco: Never Used   Substance Use Topics    Alcohol use: No     Frequency: Never                                Ready to quit: Not Answered  Counseling given: Not Answered      Vital Signs (Current): There were no vitals filed for this visit. BP Readings from Last 3 Encounters:   05/14/21 (!) 118/53   05/12/21 124/80   04/28/21 138/72       NPO Status:                                                                                 BMI:   Wt Readings from Last 3 Encounters:   05/12/21 245 lb (111.1 kg)   04/28/21 245 lb (111.1 kg)   03/24/21 245 lb (111.1 kg)     There is no height or weight on file to calculate BMI.    CBC:   Lab Results   Component Value Date    WBC 10.6 05/14/2021    RBC 4.62 05/14/2021    HGB 11.8 05/14/2021    HCT 36.7 05/14/2021    MCV 79.4 05/14/2021    RDW 14.6 05/14/2021     05/14/2021       CMP:   Lab Results   Component Value Date     05/14/2021    K 4.2 05/14/2021    K 4.3 05/12/2021     05/14/2021    CO2 27 05/14/2021    BUN 18 05/14/2021    CREATININE 1.7 05/14/2021    GFRAA 49 05/14/2021    LABGLOM 49 05/14/2021    GLUCOSE 217 05/14/2021    PROT 8.6 05/12/2021    CALCIUM 9.1 05/14/2021    BILITOT 0.2 05/12/2021    ALKPHOS 124 05/12/2021    AST 11 05/12/2021    ALT 6 05/12/2021       POC Tests: No results for input(s): POCGLU, POCNA, POCK, POCCL, POCBUN, POCHEMO, POCHCT in the last 72 hours.     Coags:   Lab Results   Component Value Date    PROTIME 11.3 05/12/2021    INR 1.0 05/12/2021    APTT 32.7 05/12/2021       HCG (If Applicable): No results found for: PREGTESTUR, PREGSERUM, HCG, HCGQUANT     ABGs:   Lab Results   Component Value Date    OCC8KMG 22.8 03/20/2020        Type & Screen (If Applicable):  No results found for: LABABO, LABRH    Drug/Infectious Status (If Applicable):  No results found for: HIV, HEPCAB    COVID-19 Screening (If Applicable):   Lab Results   Component Value Date    COVID19 Not Detected 12/10/2020     4/16/2020  Ventricular Rate  79  BPM  Final  04/16/2020  2:21 PM  [facility]    Atrial Rate  79  BPM  Final  04/16/2020  2:21 PM  [facility]    P-R Interval  244  ms  Final  04/16/2020  2:21 PM  [facility]    QRS Duration  98  ms  Final  04/16/2020  2:21 PM  [facility]    Q-T Interval  426  ms  Final  04/16/2020  2:21 PM  [facility]    QTc Calculation (Bazett)  488  ms  Final  04/16/2020  2:21 PM  [facility]    P Axis  21  degrees  Final  04/16/2020  2:21 PM  [facility]    R Axis  14  degrees  Final  04/16/2020  2:21 PM  [facility]    T Axis  46  degrees  Final  04/16/2020  2:21 PM  [facility]    Testing Performed By     Lab - [address]  Name  Director  Address  Valid Date Range    360-[facility]  [facility] MUSE  Unknown  Unknown  04/18/16 0721-Present    Narrative & Impression     Sinus rhythm with 1st degree AV block with premature supraventricular complexes  Nonspecific T wave abnormality  Abnormal ECG  Confirmed by Kayli Childress ([id number]) on 4/17/2020 3:56:14 PM     Chest X-ray 06/04/2020  Impression         1. Tip of the left arm PICC line is at the level of the proximal SVC. 2. Small left basilar opacity, which may represent atelectasis,    pneumonia, effusion or combination thereof. Anesthesia Evaluation  Patient summary reviewed and Nursing notes reviewed no history of anesthetic complications:   Airway: Mallampati: III  TM distance: >3 FB   Neck ROM: full  Mouth opening: > = 3 FB Dental:          Pulmonary:   (+) decreased breath sounds,  current smoker                           Cardiovascular:    (+) hypertension:, hyperlipidemia      ECG reviewed  Rhythm: regular  Rate: normal  Echocardiogram reviewed               ROS comment: Echo 03/2020 Summary   No source of embolus found. No intra-cardiac mass, thrombus or vegetations.       Overall ejection fraction is normal.   Normal right ventricle structure and function. Mild mitral regurgitation is present. Neuro/Psych:   (+) neuromuscular disease:,             GI/Hepatic/Renal:   (+) renal disease: ESRD and dialysis,           Endo/Other:    (+) DiabetesType II DM, poorly controlled, , blood dyscrasia: anemia:., .                  ROS comment: 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[facility]  Abdominal:           Vascular:   + PVD, aortic or cerebral, DVT, .        ROS comment: S/p femoral bypass  S/p fem endarterectomy  S/p aka. Anesthesia Plan      MAC     ASA 3     (Pt assessed prior to anesthesia, note entered as soon as able)  Induction: intravenous. Anesthetic plan and risks discussed with patient. Plan discussed with attending and CRNA. Attending anesthesiologist reviewed and agrees with Pre Eval content              SHONDA Hernandez - CRNA   5/14/2021      Pt seen, examined, chart reviewed, plan discussed.   Jyoti Garcia  5/14/2021  5:13 PM

## 2021-05-15 LAB
METER GLUCOSE: 106 MG/DL (ref 74–99)
METER GLUCOSE: 164 MG/DL (ref 74–99)
METER GLUCOSE: 193 MG/DL (ref 74–99)
METER GLUCOSE: 239 MG/DL (ref 74–99)

## 2021-05-15 PROCEDURE — 2580000003 HC RX 258: Performed by: SURGERY

## 2021-05-15 PROCEDURE — 6360000002 HC RX W HCPCS: Performed by: SURGERY

## 2021-05-15 PROCEDURE — 6360000002 HC RX W HCPCS: Performed by: STUDENT IN AN ORGANIZED HEALTH CARE EDUCATION/TRAINING PROGRAM

## 2021-05-15 PROCEDURE — 6370000000 HC RX 637 (ALT 250 FOR IP): Performed by: SPECIALIST

## 2021-05-15 PROCEDURE — 6360000002 HC RX W HCPCS: Performed by: SPECIALIST

## 2021-05-15 PROCEDURE — 82962 GLUCOSE BLOOD TEST: CPT

## 2021-05-15 PROCEDURE — 1200000000 HC SEMI PRIVATE

## 2021-05-15 PROCEDURE — 6370000000 HC RX 637 (ALT 250 FOR IP): Performed by: STUDENT IN AN ORGANIZED HEALTH CARE EDUCATION/TRAINING PROGRAM

## 2021-05-15 PROCEDURE — 6370000000 HC RX 637 (ALT 250 FOR IP): Performed by: SURGERY

## 2021-05-15 RX ORDER — OXYCODONE HYDROCHLORIDE 10 MG/1
10 TABLET ORAL EVERY 4 HOURS PRN
Status: DISCONTINUED | OUTPATIENT
Start: 2021-05-15 | End: 2021-05-20 | Stop reason: HOSPADM

## 2021-05-15 RX ORDER — OXYCODONE HYDROCHLORIDE 5 MG/1
5 TABLET ORAL EVERY 4 HOURS PRN
Status: DISCONTINUED | OUTPATIENT
Start: 2021-05-15 | End: 2021-05-20 | Stop reason: HOSPADM

## 2021-05-15 RX ADMIN — HYDROMORPHONE HYDROCHLORIDE 1 MG: 1 INJECTION, SOLUTION INTRAMUSCULAR; INTRAVENOUS; SUBCUTANEOUS at 17:09

## 2021-05-15 RX ADMIN — GABAPENTIN 800 MG: 400 CAPSULE ORAL at 14:37

## 2021-05-15 RX ADMIN — HYDROMORPHONE HYDROCHLORIDE 1 MG: 1 INJECTION, SOLUTION INTRAMUSCULAR; INTRAVENOUS; SUBCUTANEOUS at 11:01

## 2021-05-15 RX ADMIN — MIRTAZAPINE 7.5 MG: 15 TABLET, FILM COATED ORAL at 21:04

## 2021-05-15 RX ADMIN — INSULIN LISPRO 2 UNITS: 100 INJECTION, SOLUTION INTRAVENOUS; SUBCUTANEOUS at 21:05

## 2021-05-15 RX ADMIN — METOPROLOL TARTRATE 25 MG: 25 TABLET, FILM COATED ORAL at 21:05

## 2021-05-15 RX ADMIN — GABAPENTIN 800 MG: 400 CAPSULE ORAL at 21:05

## 2021-05-15 RX ADMIN — METOPROLOL TARTRATE 25 MG: 25 TABLET, FILM COATED ORAL at 09:29

## 2021-05-15 RX ADMIN — HYDROMORPHONE HYDROCHLORIDE 0.5 MG: 1 INJECTION, SOLUTION INTRAMUSCULAR; INTRAVENOUS; SUBCUTANEOUS at 06:54

## 2021-05-15 RX ADMIN — Medication 10 ML: at 09:31

## 2021-05-15 RX ADMIN — CLONIDINE HYDROCHLORIDE 0.1 MG: 0.1 TABLET ORAL at 14:37

## 2021-05-15 RX ADMIN — CLONIDINE HYDROCHLORIDE 0.1 MG: 0.1 TABLET ORAL at 21:05

## 2021-05-15 RX ADMIN — INSULIN LISPRO 3 UNITS: 100 INJECTION, SOLUTION INTRAVENOUS; SUBCUTANEOUS at 12:51

## 2021-05-15 RX ADMIN — INSULIN LISPRO 15 UNITS: 100 INJECTION, SOLUTION INTRAVENOUS; SUBCUTANEOUS at 12:52

## 2021-05-15 RX ADMIN — HYDRALAZINE HYDROCHLORIDE 25 MG: 25 TABLET, FILM COATED ORAL at 21:04

## 2021-05-15 RX ADMIN — HYDRALAZINE HYDROCHLORIDE 25 MG: 25 TABLET, FILM COATED ORAL at 14:38

## 2021-05-15 RX ADMIN — OXYCODONE 5 MG: 5 TABLET ORAL at 03:05

## 2021-05-15 RX ADMIN — DULOXETINE HYDROCHLORIDE 120 MG: 60 CAPSULE, DELAYED RELEASE ORAL at 09:28

## 2021-05-15 RX ADMIN — SODIUM CHLORIDE, POTASSIUM CHLORIDE, SODIUM LACTATE AND CALCIUM CHLORIDE: 600; 310; 30; 20 INJECTION, SOLUTION INTRAVENOUS at 06:55

## 2021-05-15 RX ADMIN — CHLORTHALIDONE 25 MG: 25 TABLET ORAL at 09:30

## 2021-05-15 RX ADMIN — Medication 1 CAPSULE: at 09:30

## 2021-05-15 RX ADMIN — CILOSTAZOL 50 MG: 50 TABLET ORAL at 21:05

## 2021-05-15 RX ADMIN — OXYCODONE HYDROCHLORIDE 10 MG: 10 TABLET, FILM COATED, EXTENDED RELEASE ORAL at 09:29

## 2021-05-15 RX ADMIN — GABAPENTIN 800 MG: 400 CAPSULE ORAL at 17:10

## 2021-05-15 RX ADMIN — SODIUM CHLORIDE, PRESERVATIVE FREE 300 UNITS: 5 INJECTION INTRAVENOUS at 21:06

## 2021-05-15 RX ADMIN — OXYCODONE HYDROCHLORIDE 10 MG: 10 TABLET, FILM COATED, EXTENDED RELEASE ORAL at 21:05

## 2021-05-15 RX ADMIN — OXYCODONE 5 MG: 5 TABLET ORAL at 08:14

## 2021-05-15 RX ADMIN — INSULIN LISPRO 15 UNITS: 100 INJECTION, SOLUTION INTRAVENOUS; SUBCUTANEOUS at 09:39

## 2021-05-15 RX ADMIN — AMLODIPINE BESYLATE 10 MG: 10 TABLET ORAL at 09:30

## 2021-05-15 RX ADMIN — ENOXAPARIN SODIUM 40 MG: 40 INJECTION SUBCUTANEOUS at 09:28

## 2021-05-15 RX ADMIN — GABAPENTIN 800 MG: 400 CAPSULE ORAL at 09:29

## 2021-05-15 RX ADMIN — Medication 10 ML: at 21:07

## 2021-05-15 RX ADMIN — CLONIDINE HYDROCHLORIDE 0.1 MG: 0.1 TABLET ORAL at 09:28

## 2021-05-15 RX ADMIN — HYDRALAZINE HYDROCHLORIDE 25 MG: 25 TABLET, FILM COATED ORAL at 09:30

## 2021-05-15 RX ADMIN — SODIUM CHLORIDE, PRESERVATIVE FREE 300 UNITS: 5 INJECTION INTRAVENOUS at 09:34

## 2021-05-15 RX ADMIN — INSULIN LISPRO 6 UNITS: 100 INJECTION, SOLUTION INTRAVENOUS; SUBCUTANEOUS at 09:35

## 2021-05-15 RX ADMIN — HYDROMORPHONE HYDROCHLORIDE 0.5 MG: 1 INJECTION, SOLUTION INTRAMUSCULAR; INTRAVENOUS; SUBCUTANEOUS at 04:45

## 2021-05-15 RX ADMIN — CILOSTAZOL 50 MG: 50 TABLET ORAL at 09:29

## 2021-05-15 ASSESSMENT — PAIN SCALES - GENERAL
PAINLEVEL_OUTOF10: 8
PAINLEVEL_OUTOF10: 10

## 2021-05-15 ASSESSMENT — PAIN DESCRIPTION - PAIN TYPE: TYPE: SURGICAL PAIN

## 2021-05-15 ASSESSMENT — PAIN DESCRIPTION - DIRECTION: RADIATING_TOWARDS: HIP

## 2021-05-15 ASSESSMENT — PAIN DESCRIPTION - ORIENTATION: ORIENTATION: RIGHT

## 2021-05-15 NOTE — PLAN OF CARE
Problem: Skin Integrity:  Goal: Will show no infection signs and symptoms  Description: Will show no infection signs and symptoms  5/15/2021 1339 by Lucy Parker RN  Outcome: Met This Shift  5/15/2021 0025 by Theresa Hayes RN  Outcome: Met This Shift     Problem: Skin Integrity:  Goal: Absence of new skin breakdown  Description: Absence of new skin breakdown  5/15/2021 1339 by Lucy Parker RN  Outcome: Met This Shift  5/15/2021 0025 by Theresa Hayes RN  Outcome: Met This Shift     Problem: Falls - Risk of:  Goal: Will remain free from falls  Description: Will remain free from falls  5/15/2021 1339 by Lucy Parker RN  Outcome: Met This Shift  5/15/2021 0025 by Theresa Hayes RN  Outcome: Met This Shift     Problem: Falls - Risk of:  Goal: Absence of physical injury  Description: Absence of physical injury  5/15/2021 1339 by Lucy Parker RN  Outcome: Met This Shift  5/15/2021 0025 by Theresa Hayes RN  Outcome: Met This Shift

## 2021-05-15 NOTE — PROGRESS NOTES
Vascular Surgery Progress Note    Pt is being seen in f/u today regarding RLE AKA wound debridement 5/14    Subjective  Pt s/e. Dressing changed at bedside, patient did not tolerate very well is having a lot of pain.      Current Medications:    sodium chloride      sodium chloride      lactated ringers 100 mL/hr at 05/15/21 0655    dextrose        oxyCODONE **OR** oxyCODONE, HYDROmorphone, sodium chloride flush, sodium chloride, heparin flush, sodium chloride flush, sodium chloride, promethazine **OR** ondansetron, glucose, dextrose, glucagon (rDNA), dextrose    lactobacillus  1 capsule Oral Daily    lidocaine PF  5 mL Intradermal Once    sodium chloride flush  5-40 mL Intravenous 2 times per day    heparin flush  3 mL Intravenous 2 times per day    diphenhydrAMINE  25 mg Intravenous Once    cloNIDine  0.1 mg Oral TID    cilostazol  50 mg Oral BID    amLODIPine  10 mg Oral Daily    hydrALAZINE  25 mg Oral TID    gabapentin  800 mg Oral 4x Daily    metoprolol tartrate  25 mg Oral BID    insulin lispro  15 Units Subcutaneous TID AC    oxyCODONE  10 mg Oral 2 times per day    chlorthalidone  25 mg Oral Daily    mirtazapine  7.5 mg Oral Nightly    DULoxetine  120 mg Oral Daily    sodium chloride flush  5-40 mL Intravenous 2 times per day    enoxaparin  40 mg Subcutaneous Daily    insulin lispro  0-18 Units Subcutaneous TID WC    insulin lispro  0-9 Units Subcutaneous Nightly    cefepime  2,000 mg Intravenous 30 Min Pre-Op        PHYSICAL EXAM:    /72   Pulse 78   Temp 97.9 °F (36.6 °C) (Temporal)   Resp 17   Ht 6' 2\" (1.88 m)   Wt 245 lb (111.1 kg)   SpO2 94%   BMI 31.46 kg/m²     Intake/Output Summary (Last 24 hours) at 5/15/2021 0804  Last data filed at 5/15/2021 0535  Gross per 24 hour   Intake 1847 ml   Output 350 ml   Net 1497 ml          Gen: awake, alert and oriented x3, uncomfortable   CVS: RR  Resp: No increased work of breathing  Abd: Soft, non-tender, non-distended  R

## 2021-05-15 NOTE — PLAN OF CARE
Problem: Skin Integrity:  Goal: Will show no infection signs and symptoms  Description: Will show no infection signs and symptoms  5/15/2021 0025 by Kemi Roque RN  Outcome: Met This Shift  5/14/2021 1140 by Ric Mortimer, RN  Outcome: Met This Shift  Goal: Absence of new skin breakdown  Description: Absence of new skin breakdown  5/15/2021 0025 by Kemi Roque RN  Outcome: Met This Shift  5/14/2021 1140 by Ric Mortimer, RN  Outcome: Met This Shift     Problem: Falls - Risk of:  Goal: Will remain free from falls  Description: Will remain free from falls  5/15/2021 0025 by Kemi Roque RN  Outcome: Met This Shift  5/14/2021 1140 by Ric Mortimer, RN  Outcome: Met This Shift  Goal: Absence of physical injury  Description: Absence of physical injury  5/15/2021 0025 by Kemi Roque RN  Outcome: Met This Shift  5/14/2021 1140 by Ric Mortimer, RN  Outcome: Met This Shift

## 2021-05-15 NOTE — PROGRESS NOTES
[address] [place] [address] [facility]  Progress Note      C/C: Right hip stump wound infection       SUBJECTIVE:  Patient is tolerating medications. No reported adverse drug reactions. No nausea, vomiting, diarrhea. Reports pain in the wound   Afebrile     Review of systems:  As stated above in the chief complaint, otherwise negative. Medications:  Scheduled Meds:   lactobacillus  1 capsule Oral Daily    lidocaine PF  5 mL Intradermal Once    sodium chloride flush  5-40 mL Intravenous 2 times per day    heparin flush  3 mL Intravenous 2 times per day    diphenhydrAMINE  25 mg Intravenous Once    cloNIDine  0.1 mg Oral TID    cilostazol  50 mg Oral BID    amLODIPine  10 mg Oral Daily    hydrALAZINE  25 mg Oral TID    gabapentin  800 mg Oral 4x Daily    metoprolol tartrate  25 mg Oral BID    insulin lispro  15 Units Subcutaneous TID AC    oxyCODONE  10 mg Oral 2 times per day    chlorthalidone  25 mg Oral Daily    mirtazapine  7.5 mg Oral Nightly    DULoxetine  120 mg Oral Daily    sodium chloride flush  5-40 mL Intravenous 2 times per day    enoxaparin  40 mg Subcutaneous Daily    insulin lispro  0-18 Units Subcutaneous TID WC    insulin lispro  0-9 Units Subcutaneous Nightly    cefepime  2,000 mg Intravenous 30 Min Pre-Op     Continuous Infusions:   sodium chloride      sodium chloride      lactated ringers 100 mL/hr at 05/15/21 0655    dextrose       PRN Meds:oxyCODONE **OR** oxyCODONE, HYDROmorphone, sodium chloride flush, sodium chloride, heparin flush, sodium chloride flush, sodium chloride, promethazine **OR** ondansetron, glucose, dextrose, glucagon (rDNA), dextrose    OBJECTIVE:  /69   Pulse 89   Temp 97.5 °F (36.4 °C) (Temporal)   Resp 18   Ht 6' 2\" (1.88 m)   Wt 245 lb (111.1 kg)   SpO2 95%   BMI 31.46 kg/m²   Temp  Av °F (36.7 °C)  Min: 97.3 °F (36.3 °C)  Max: 99.8 °F (37.7 °C)  Constitutional: The patient is awake, alert, and oriented.    Skin: Warm and dry. No rashes were noted. HEENT: Round and reactive pupils. Moist mucous membranes. No ulcerations or thrush. Neck: Supple to movements. Chest: Clear   Cardiovascular: S1 and S2 are rhythmic and regular. No murmurs appreciated. Abdomen: Soft , non tender, bowel sound +  Genitourinary: male  Extremities: No clubbing, no cyanosis, no edema.    Right hip stump wound - mod drainage, tender   Lines: peripheral    Laboratory and Tests Review:  Lab Results   Component Value Date    WBC 10.6 05/14/2021    WBC 9.6 05/13/2021    WBC 11.9 (H) 05/12/2021    HGB 11.8 (L) 05/14/2021    HCT 36.7 (L) 05/14/2021    MCV 79.4 (L) 05/14/2021     05/14/2021     Lab Results   Component Value Date    NEUTROABS 7.99 (H) 05/12/2021    NEUTROABS 5.33 06/18/2020    NEUTROABS 9.02 (H) 06/14/2020     No results found for: [facility]  Lab Results   Component Value Date    ALT 6 05/12/2021    AST 11 05/12/2021    ALKPHOS 124 05/12/2021    BILITOT 0.2 05/12/2021     Lab Results   Component Value Date     05/14/2021    K 4.2 05/14/2021    K 4.3 05/12/2021     05/14/2021    CO2 27 05/14/2021    BUN 18 05/14/2021    CREATININE 1.7 05/14/2021    CREATININE 1.7 05/13/2021    CREATININE 1.8 05/12/2021    GFRAA 49 05/14/2021    LABGLOM 49 05/14/2021    GLUCOSE 217 05/14/2021    PROT 8.6 05/12/2021    LABALBU 3.5 05/12/2021    CALCIUM 9.1 05/14/2021    BILITOT 0.2 05/12/2021    ALKPHOS 124 05/12/2021    AST 11 05/12/2021    ALT 6 05/12/2021     Lab Results   Component Value Date    CRP 2.9 (H) 05/13/2021    CRP 10.6 (H) 10/24/2018    CRP 2.1 (H) 06/25/2018     Lab Results   Component Value Date    SEDRATE 65 (H) 05/13/2021    SEDRATE 128 (H) 10/24/2018    SEDRATE 77 (H) 06/25/2018     Radiology:  Reviewed    Microbiology:   Lab Results   Component Value Date    BC 24 Hours no growth 05/12/2021    BC 5 Days- no growth 04/16/2020    BC 5 Days- no growth 03/20/2020    ORG Staphylococcus aureus 05/12/2021    ORG Corynebacterium species 05/12/2021    ORG Staphylococcus aureus 12/17/2020     Lab Results   Component Value Date    BLOODCULT2 24 Hours no growth 05/12/2021    BLOODCULT2 5 Days- no growth 04/16/2020    BLOODCULT2 5 Days- no growth 03/20/2020    ORG Staphylococcus aureus 05/12/2021    ORG Corynebacterium species 05/12/2021    ORG Staphylococcus aureus 12/17/2020     WOUND/ABSCESS   Date Value Ref Range Status   05/12/2021   Final    Heavy growth  Methicillin resistant Staph aureus isolated. Most Methicillin  resistant Staphylococcus are usually resistant to multiple  antibiotics including other B-Lactams, Aminoglycosides,  Macrolides, Clindamycin and Tetracycline. Contact isolation  is indicated. 05/12/2021 Light growth  Final   08/26/2020   Final    Rare growth  Methicillin resistant Staph aureus isolated. Most Methicillin  resistant Staphylococcus are usually resistant to multiple  antibiotics including other B-Lactams, Aminoglycosides,  Macrolides, Clindamycin and Tetracycline. Contact isolation  is indicated. No results found for: RESPSMEAR      Component Value Date/Time    FUNGSM No Fungal elements seen 12/17/2020 1342    LABFUNG No growth after 4 weeks of incubation. 12/17/2020 1342     No results found for: CULTRESP  No results found for: CXCATHTIP  No results found for: [facility] [place]  Culture Surgical   Date Value Ref Range Status   12/17/2020   Final    Rare growth  Methicillin resistant Staph aureus isolated. Most Methicillin  resistant Staphylococcus are usually resistant to multiple  antibiotics including other B-Lactams, Aminoglycosides,  Macrolides, Clindamycin and Tetracycline. Contact isolation  is indicated. 10/15/2020   Final    Light growth  Methicillin resistant Staph aureus isolated. Most Methicillin  resistant Staphylococcus are usually resistant to multiple  antibiotics including other B-Lactams, Aminoglycosides,  Macrolides, Clindamycin and Tetracycline. Contact isolation  is indicated.      04/21/2020 Moderate growth  Final   04/21/2020 Moderate growth  Final   04/21/2020 Moderate growth  Final     Urine Culture, Routine   Date Value Ref Range Status   04/19/2020 Growth not present  Final   03/20/2020 <10,000 CFU/mL  Gram negative rods   (A)  Final   03/20/2020 <10,000 CFU/ml  Final   03/20/2020 <10,000 CFU/ml  Final     No results found for: [facility]       Wound cx 5/12 MRSA and corynebacterium  Baseline ESR 65, CRP 2.9      Assessment:  · MRSA infection right hip stump  associated with deep structures in the residual graft or osteomyelitis  · PICC line in place    Plan:    · Cont daptomycin plus cefepime pending final cultures  · Lactobacillus  · Discussed with vascular surgery  · For debridement today-preop antibiotic on board  · Check cultures    Electronically signed by Sanket Decker MD on 5/15/2021 at 4:22 PM

## 2021-05-16 LAB
ANION GAP SERPL CALCULATED.3IONS-SCNC: 7 MMOL/L (ref 7–16)
BUN BLDV-MCNC: 23 MG/DL (ref 6–23)
CALCIUM SERPL-MCNC: 8.9 MG/DL (ref 8.6–10.2)
CHLORIDE BLD-SCNC: 99 MMOL/L (ref 98–107)
CO2: 30 MMOL/L (ref 22–29)
CREAT SERPL-MCNC: 1.9 MG/DL (ref 0.7–1.2)
GFR AFRICAN AMERICAN: 43
GFR NON-AFRICAN AMERICAN: 43 ML/MIN/1.73
GLUCOSE BLD-MCNC: 176 MG/DL (ref 74–99)
HCT VFR BLD CALC: 31.6 % (ref 37–54)
HEMOGLOBIN: 10 G/DL (ref 12.5–16.5)
MCH RBC QN AUTO: 25.1 PG (ref 26–35)
MCHC RBC AUTO-ENTMCNC: 31.6 % (ref 32–34.5)
MCV RBC AUTO: 79.4 FL (ref 80–99.9)
METER GLUCOSE: 152 MG/DL (ref 74–99)
METER GLUCOSE: 180 MG/DL (ref 74–99)
METER GLUCOSE: 189 MG/DL (ref 74–99)
METER GLUCOSE: 93 MG/DL (ref 74–99)
PDW BLD-RTO: 14.4 FL (ref 11.5–15)
PLATELET # BLD: 244 E9/L (ref 130–450)
PMV BLD AUTO: 11.3 FL (ref 7–12)
POTASSIUM SERPL-SCNC: 4 MMOL/L (ref 3.5–5)
RBC # BLD: 3.98 E12/L (ref 3.8–5.8)
SODIUM BLD-SCNC: 136 MMOL/L (ref 132–146)
WBC # BLD: 11.8 E9/L (ref 4.5–11.5)

## 2021-05-16 PROCEDURE — 6370000000 HC RX 637 (ALT 250 FOR IP): Performed by: SURGERY

## 2021-05-16 PROCEDURE — 36415 COLL VENOUS BLD VENIPUNCTURE: CPT

## 2021-05-16 PROCEDURE — 82962 GLUCOSE BLOOD TEST: CPT

## 2021-05-16 PROCEDURE — 2580000003 HC RX 258: Performed by: SURGERY

## 2021-05-16 PROCEDURE — 80048 BASIC METABOLIC PNL TOTAL CA: CPT

## 2021-05-16 PROCEDURE — 2580000003 HC RX 258: Performed by: SPECIALIST

## 2021-05-16 PROCEDURE — 6360000002 HC RX W HCPCS: Performed by: STUDENT IN AN ORGANIZED HEALTH CARE EDUCATION/TRAINING PROGRAM

## 2021-05-16 PROCEDURE — 6360000002 HC RX W HCPCS: Performed by: INTERNAL MEDICINE

## 2021-05-16 PROCEDURE — 85027 COMPLETE CBC AUTOMATED: CPT

## 2021-05-16 PROCEDURE — 6360000002 HC RX W HCPCS: Performed by: SPECIALIST

## 2021-05-16 PROCEDURE — 2580000003 HC RX 258: Performed by: INTERNAL MEDICINE

## 2021-05-16 PROCEDURE — 1200000000 HC SEMI PRIVATE

## 2021-05-16 RX ORDER — FENTANYL CITRATE 50 UG/ML
100 INJECTION, SOLUTION INTRAMUSCULAR; INTRAVENOUS SEE ADMIN INSTRUCTIONS
Status: DISCONTINUED | OUTPATIENT
Start: 2021-05-16 | End: 2021-05-20 | Stop reason: HOSPADM

## 2021-05-16 RX ADMIN — CHLORTHALIDONE 25 MG: 25 TABLET ORAL at 09:16

## 2021-05-16 RX ADMIN — OXYCODONE HYDROCHLORIDE 10 MG: 10 TABLET, FILM COATED, EXTENDED RELEASE ORAL at 21:22

## 2021-05-16 RX ADMIN — INSULIN LISPRO 2 UNITS: 100 INJECTION, SOLUTION INTRAVENOUS; SUBCUTANEOUS at 21:23

## 2021-05-16 RX ADMIN — DAPTOMYCIN 500 MG: 500 INJECTION, POWDER, LYOPHILIZED, FOR SOLUTION INTRAVENOUS at 16:44

## 2021-05-16 RX ADMIN — INSULIN LISPRO 3 UNITS: 100 INJECTION, SOLUTION INTRAVENOUS; SUBCUTANEOUS at 09:20

## 2021-05-16 RX ADMIN — INSULIN LISPRO 3 UNITS: 100 INJECTION, SOLUTION INTRAVENOUS; SUBCUTANEOUS at 12:21

## 2021-05-16 RX ADMIN — SODIUM CHLORIDE, PRESERVATIVE FREE 300 UNITS: 5 INJECTION INTRAVENOUS at 21:23

## 2021-05-16 RX ADMIN — CLONIDINE HYDROCHLORIDE 0.1 MG: 0.1 TABLET ORAL at 13:38

## 2021-05-16 RX ADMIN — MIRTAZAPINE 7.5 MG: 15 TABLET, FILM COATED ORAL at 21:22

## 2021-05-16 RX ADMIN — DULOXETINE HYDROCHLORIDE 120 MG: 60 CAPSULE, DELAYED RELEASE ORAL at 09:16

## 2021-05-16 RX ADMIN — HYDRALAZINE HYDROCHLORIDE 25 MG: 25 TABLET, FILM COATED ORAL at 21:22

## 2021-05-16 RX ADMIN — Medication 10 ML: at 21:25

## 2021-05-16 RX ADMIN — INSULIN LISPRO 15 UNITS: 100 INJECTION, SOLUTION INTRAVENOUS; SUBCUTANEOUS at 09:17

## 2021-05-16 RX ADMIN — CLONIDINE HYDROCHLORIDE 0.1 MG: 0.1 TABLET ORAL at 21:22

## 2021-05-16 RX ADMIN — SODIUM CHLORIDE, POTASSIUM CHLORIDE, SODIUM LACTATE AND CALCIUM CHLORIDE: 600; 310; 30; 20 INJECTION, SOLUTION INTRAVENOUS at 05:15

## 2021-05-16 RX ADMIN — GABAPENTIN 800 MG: 400 CAPSULE ORAL at 17:03

## 2021-05-16 RX ADMIN — OXYCODONE HYDROCHLORIDE 10 MG: 10 TABLET, FILM COATED, EXTENDED RELEASE ORAL at 07:36

## 2021-05-16 RX ADMIN — CILOSTAZOL 50 MG: 50 TABLET ORAL at 09:16

## 2021-05-16 RX ADMIN — GABAPENTIN 800 MG: 400 CAPSULE ORAL at 09:15

## 2021-05-16 RX ADMIN — INSULIN LISPRO 15 UNITS: 100 INJECTION, SOLUTION INTRAVENOUS; SUBCUTANEOUS at 12:23

## 2021-05-16 RX ADMIN — Medication 10 ML: at 09:23

## 2021-05-16 RX ADMIN — HYDRALAZINE HYDROCHLORIDE 25 MG: 25 TABLET, FILM COATED ORAL at 09:23

## 2021-05-16 RX ADMIN — HYDRALAZINE HYDROCHLORIDE 25 MG: 25 TABLET, FILM COATED ORAL at 13:38

## 2021-05-16 RX ADMIN — Medication 10 ML: at 00:20

## 2021-05-16 RX ADMIN — HYDROMORPHONE HYDROCHLORIDE 1 MG: 1 INJECTION, SOLUTION INTRAMUSCULAR; INTRAVENOUS; SUBCUTANEOUS at 16:51

## 2021-05-16 RX ADMIN — CILOSTAZOL 50 MG: 50 TABLET ORAL at 21:22

## 2021-05-16 RX ADMIN — GABAPENTIN 800 MG: 400 CAPSULE ORAL at 21:21

## 2021-05-16 RX ADMIN — CLONIDINE HYDROCHLORIDE 0.1 MG: 0.1 TABLET ORAL at 09:16

## 2021-05-16 RX ADMIN — METOPROLOL TARTRATE 25 MG: 25 TABLET, FILM COATED ORAL at 21:22

## 2021-05-16 RX ADMIN — GABAPENTIN 800 MG: 400 CAPSULE ORAL at 12:27

## 2021-05-16 RX ADMIN — METOPROLOL TARTRATE 25 MG: 25 TABLET, FILM COATED ORAL at 09:23

## 2021-05-16 RX ADMIN — HYDROMORPHONE HYDROCHLORIDE 1 MG: 1 INJECTION, SOLUTION INTRAMUSCULAR; INTRAVENOUS; SUBCUTANEOUS at 13:44

## 2021-05-16 RX ADMIN — AMLODIPINE BESYLATE 10 MG: 10 TABLET ORAL at 09:16

## 2021-05-16 RX ADMIN — HYDROMORPHONE HYDROCHLORIDE 1 MG: 1 INJECTION, SOLUTION INTRAMUSCULAR; INTRAVENOUS; SUBCUTANEOUS at 23:26

## 2021-05-16 RX ADMIN — HYDROMORPHONE HYDROCHLORIDE 1 MG: 1 INJECTION, SOLUTION INTRAMUSCULAR; INTRAVENOUS; SUBCUTANEOUS at 09:23

## 2021-05-16 ASSESSMENT — PAIN DESCRIPTION - ONSET
ONSET: ON-GOING
ONSET: ON-GOING

## 2021-05-16 ASSESSMENT — PAIN DESCRIPTION - LOCATION
LOCATION: HIP
LOCATION: LEG

## 2021-05-16 ASSESSMENT — PAIN SCALES - GENERAL
PAINLEVEL_OUTOF10: 6
PAINLEVEL_OUTOF10: 9

## 2021-05-16 ASSESSMENT — PAIN DESCRIPTION - PAIN TYPE
TYPE: SURGICAL PAIN
TYPE: SURGICAL PAIN

## 2021-05-16 ASSESSMENT — PAIN DESCRIPTION - FREQUENCY: FREQUENCY: CONTINUOUS

## 2021-05-16 ASSESSMENT — PAIN DESCRIPTION - DESCRIPTORS: DESCRIPTORS: ACHING

## 2021-05-16 ASSESSMENT — PAIN - FUNCTIONAL ASSESSMENT: PAIN_FUNCTIONAL_ASSESSMENT: PREVENTS OR INTERFERES SOME ACTIVE ACTIVITIES AND ADLS

## 2021-05-16 ASSESSMENT — PAIN DESCRIPTION - ORIENTATION: ORIENTATION: RIGHT

## 2021-05-16 ASSESSMENT — PAIN DESCRIPTION - PROGRESSION: CLINICAL_PROGRESSION: NOT CHANGED

## 2021-05-16 NOTE — PROGRESS NOTES
c/d/i    LABS:    Lab Results   Component Value Date    WBC 11.8 (H) 05/16/2021    HGB 10.0 (L) 05/16/2021    HCT 31.6 (L) 05/16/2021     05/16/2021    PROTIME 11.3 05/12/2021    INR 1.0 05/12/2021    APTT 32.7 05/12/2021    K 4.0 05/16/2021    BUN 23 05/16/2021    CREATININE 1.9 (H) 05/16/2021       A/P  63 y.o. male s/p RLE AKA with wound debridement 5/14    - daily dressing changes  - continue antibiotics per ID  - continue diet     Roman Doss MD

## 2021-05-16 NOTE — PROGRESS NOTES
[address] [place] [address] [facility]  [facility]  Progress Note      C/C: Right hip stump wound infection       SUBJECTIVE:  Patient is tolerating medications. No reported adverse drug reactions. No nausea, vomiting, diarrhea. Reports pain in the wound   Afebrile     Review of systems:  As stated above in the chief complaint, otherwise negative.     Medications:  Scheduled Meds:   fentanNYL  100 mcg Intravenous See Admin Instructions    lactobacillus  1 capsule Oral Daily    lidocaine PF  5 mL Intradermal Once    sodium chloride flush  5-40 mL Intravenous 2 times per day    heparin flush  3 mL Intravenous 2 times per day    diphenhydrAMINE  25 mg Intravenous Once    cloNIDine  0.1 mg Oral TID    cilostazol  50 mg Oral BID    amLODIPine  10 mg Oral Daily    hydrALAZINE  25 mg Oral TID    gabapentin  800 mg Oral 4x Daily    metoprolol tartrate  25 mg Oral BID    insulin lispro  15 Units Subcutaneous TID AC    oxyCODONE  10 mg Oral 2 times per day    chlorthalidone  25 mg Oral Daily    mirtazapine  7.5 mg Oral Nightly    DULoxetine  120 mg Oral Daily    sodium chloride flush  5-40 mL Intravenous 2 times per day    enoxaparin  40 mg Subcutaneous Daily    insulin lispro  0-18 Units Subcutaneous TID WC    insulin lispro  0-9 Units Subcutaneous Nightly    cefepime  2,000 mg Intravenous 30 Min Pre-Op     Continuous Infusions:   sodium chloride      sodium chloride      lactated ringers 100 mL/hr at 21 0515    dextrose       PRN Meds:oxyCODONE **OR** oxyCODONE, HYDROmorphone, sodium chloride flush, sodium chloride, heparin flush, sodium chloride flush, sodium chloride, promethazine **OR** ondansetron, glucose, dextrose, glucagon (rDNA), dextrose    OBJECTIVE:  /70   Pulse 84   Temp 97.8 °F (36.6 °C)   Resp 18   Ht 6' 2\" (1.88 m)   Wt 245 lb (111.1 kg)   SpO2 94%   BMI 31.46 kg/m²   Temp  Av.4 °F (36.3 °C)  Min: 97 °F (36.1 °C)  Max: 97.8 °F (36.6 °C)  Constitutional: The patient is awake, alert, and oriented. Skin: Warm and dry. No rashes were noted. HEENT:No pallor, no icterus, no LN, no thrush . Neck: Supple to movements. Chest: Clear   Cardiovascular: S1 and S2 are rhythmic and regular. No murmurs appreciated.    Abdomen: Soft , non tender, bowel sound +  Extremities: Right hip stump wound - mod drainage, tender   Lines: peripheral    Laboratory and Tests Review:  Lab Results   Component Value Date    WBC 11.8 (H) 05/16/2021    WBC 10.6 05/14/2021    WBC 9.6 05/13/2021    HGB 10.0 (L) 05/16/2021    HCT 31.6 (L) 05/16/2021    MCV 79.4 (L) 05/16/2021     05/16/2021     Lab Results   Component Value Date    NEUTROABS 7.99 (H) 05/12/2021    NEUTROABS 5.33 06/18/2020    NEUTROABS 9.02 (H) 06/14/2020     No results found for: [facility]  Lab Results   Component Value Date    ALT 6 05/12/2021    AST 11 05/12/2021    ALKPHOS 124 05/12/2021    BILITOT 0.2 05/12/2021     Lab Results   Component Value Date     05/16/2021    K 4.0 05/16/2021    K 4.3 05/12/2021    CL 99 05/16/2021    CO2 30 05/16/2021    BUN 23 05/16/2021    CREATININE 1.9 05/16/2021    CREATININE 1.7 05/14/2021    CREATININE 1.7 05/13/2021    GFRAA 43 05/16/2021    LABGLOM 43 05/16/2021    GLUCOSE 176 05/16/2021    PROT 8.6 05/12/2021    LABALBU 3.5 05/12/2021    CALCIUM 8.9 05/16/2021    BILITOT 0.2 05/12/2021    ALKPHOS 124 05/12/2021    AST 11 05/12/2021    ALT 6 05/12/2021     Lab Results   Component Value Date    CRP 2.9 (H) 05/13/2021    CRP 10.6 (H) 10/24/2018    CRP 2.1 (H) 06/25/2018     Lab Results   Component Value Date    SEDRATE 65 (H) 05/13/2021    SEDRATE 128 (H) 10/24/2018    SEDRATE 77 (H) 06/25/2018     Radiology:  Reviewed    Microbiology:   Lab Results   Component Value Date    BC 24 Hours no growth 05/12/2021    BC 5 Days- no growth 04/16/2020    BC 5 Days- no growth 03/20/2020    ORG Staphylococcus aureus 05/12/2021    ORG Corynebacterium species 05/12/2021    ORG Staphylococcus aureus growth  Final   04/21/2020 Moderate growth  Final     Urine Culture, Routine   Date Value Ref Range Status   04/19/2020 Growth not present  Final   03/20/2020 <10,000 CFU/mL  Gram negative rods   (A)  Final   03/20/2020 <10,000 CFU/ml  Final   03/20/2020 <10,000 CFU/ml  Final     No results found for: [facility]       Wound cx 5/12 MRSA and corynebacterium  Baseline ESR 65, CRP 2.9      Assessment:  · MRSA infection right hip stump  associated with deep structures in the residual graft or osteomyelitis      Plan:    · Cont daptomycin 6 mg /kg IV q 24 hrs plus cefepime 2 grams IV q 12 hrs   · Lactobacillus  · Wound care   · Check cultures    Electronically signed by Cj Campos MD on 5/16/2021 at 4:15 PM

## 2021-05-16 NOTE — PLAN OF CARE
Problem: Skin Integrity:  Goal: Will show no infection signs and symptoms  Description: Will show no infection signs and symptoms  5/16/2021 0051 by Angie Abraham RN  Outcome: Met This Shift  5/15/2021 1339 by Jose Osborne RN  Outcome: Met This Shift     Problem: Falls - Risk of:  Goal: Will remain free from falls  Description: Will remain free from falls  5/16/2021 0051 by Angie Abraham RN  Outcome: Met This Shift  5/15/2021 1339 by Jose Osborne RN  Outcome: Met This Shift     Problem: Pain:  Goal: Pain level will decrease  Description: Pain level will decrease  Outcome: Met This Shift  Goal: Control of acute pain  Description: Control of acute pain  Outcome: Met This Shift

## 2021-05-17 ENCOUNTER — ANESTHESIA EVENT (OUTPATIENT)
Dept: OPERATING ROOM | Age: 66
DRG: 498 | End: 2021-05-17
Payer: COMMERCIAL

## 2021-05-17 LAB
ABO/RH: NORMAL
ANION GAP SERPL CALCULATED.3IONS-SCNC: 8 MMOL/L (ref 7–16)
ANTIBODY SCREEN: NORMAL
BLOOD CULTURE, ROUTINE: NORMAL
BUN BLDV-MCNC: 24 MG/DL (ref 6–23)
CALCIUM SERPL-MCNC: 8.4 MG/DL (ref 8.6–10.2)
CHLORIDE BLD-SCNC: 97 MMOL/L (ref 98–107)
CO2: 28 MMOL/L (ref 22–29)
CREAT SERPL-MCNC: 1.8 MG/DL (ref 0.7–1.2)
CULTURE, BLOOD 2: NORMAL
GFR AFRICAN AMERICAN: 46
GFR NON-AFRICAN AMERICAN: 46 ML/MIN/1.73
GLUCOSE BLD-MCNC: 210 MG/DL (ref 74–99)
HCT VFR BLD CALC: 28.8 % (ref 37–54)
HEMOGLOBIN: 9.2 G/DL (ref 12.5–16.5)
MCH RBC QN AUTO: 25.6 PG (ref 26–35)
MCHC RBC AUTO-ENTMCNC: 31.9 % (ref 32–34.5)
MCV RBC AUTO: 80 FL (ref 80–99.9)
METER GLUCOSE: 102 MG/DL (ref 74–99)
METER GLUCOSE: 166 MG/DL (ref 74–99)
METER GLUCOSE: 192 MG/DL (ref 74–99)
METER GLUCOSE: 266 MG/DL (ref 74–99)
PDW BLD-RTO: 14.3 FL (ref 11.5–15)
PLATELET # BLD: 198 E9/L (ref 130–450)
PMV BLD AUTO: 10.8 FL (ref 7–12)
POTASSIUM SERPL-SCNC: 3.8 MMOL/L (ref 3.5–5)
RBC # BLD: 3.6 E12/L (ref 3.8–5.8)
SODIUM BLD-SCNC: 133 MMOL/L (ref 132–146)
WBC # BLD: 9.6 E9/L (ref 4.5–11.5)

## 2021-05-17 PROCEDURE — 6370000000 HC RX 637 (ALT 250 FOR IP): Performed by: STUDENT IN AN ORGANIZED HEALTH CARE EDUCATION/TRAINING PROGRAM

## 2021-05-17 PROCEDURE — 6370000000 HC RX 637 (ALT 250 FOR IP): Performed by: SURGERY

## 2021-05-17 PROCEDURE — 86850 RBC ANTIBODY SCREEN: CPT

## 2021-05-17 PROCEDURE — 2580000003 HC RX 258: Performed by: SPECIALIST

## 2021-05-17 PROCEDURE — 86901 BLOOD TYPING SEROLOGIC RH(D): CPT

## 2021-05-17 PROCEDURE — 6370000000 HC RX 637 (ALT 250 FOR IP): Performed by: SPECIALIST

## 2021-05-17 PROCEDURE — 82962 GLUCOSE BLOOD TEST: CPT

## 2021-05-17 PROCEDURE — 85027 COMPLETE CBC AUTOMATED: CPT

## 2021-05-17 PROCEDURE — 6360000002 HC RX W HCPCS: Performed by: STUDENT IN AN ORGANIZED HEALTH CARE EDUCATION/TRAINING PROGRAM

## 2021-05-17 PROCEDURE — 86900 BLOOD TYPING SEROLOGIC ABO: CPT

## 2021-05-17 PROCEDURE — 80048 BASIC METABOLIC PNL TOTAL CA: CPT

## 2021-05-17 PROCEDURE — 1200000000 HC SEMI PRIVATE

## 2021-05-17 PROCEDURE — 2580000003 HC RX 258: Performed by: SURGERY

## 2021-05-17 PROCEDURE — 36415 COLL VENOUS BLD VENIPUNCTURE: CPT

## 2021-05-17 PROCEDURE — 6360000002 HC RX W HCPCS: Performed by: INTERNAL MEDICINE

## 2021-05-17 PROCEDURE — 2580000003 HC RX 258: Performed by: INTERNAL MEDICINE

## 2021-05-17 PROCEDURE — 6360000002 HC RX W HCPCS: Performed by: SPECIALIST

## 2021-05-17 RX ADMIN — INSULIN LISPRO 15 UNITS: 100 INJECTION, SOLUTION INTRAVENOUS; SUBCUTANEOUS at 09:39

## 2021-05-17 RX ADMIN — INSULIN LISPRO 9 UNITS: 100 INJECTION, SOLUTION INTRAVENOUS; SUBCUTANEOUS at 09:38

## 2021-05-17 RX ADMIN — DAPTOMYCIN 500 MG: 500 INJECTION, POWDER, LYOPHILIZED, FOR SOLUTION INTRAVENOUS at 18:02

## 2021-05-17 RX ADMIN — HYDROMORPHONE HYDROCHLORIDE 1 MG: 1 INJECTION, SOLUTION INTRAMUSCULAR; INTRAVENOUS; SUBCUTANEOUS at 16:42

## 2021-05-17 RX ADMIN — GABAPENTIN 800 MG: 400 CAPSULE ORAL at 09:15

## 2021-05-17 RX ADMIN — SODIUM CHLORIDE, PRESERVATIVE FREE 300 UNITS: 5 INJECTION INTRAVENOUS at 09:15

## 2021-05-17 RX ADMIN — OXYCODONE HYDROCHLORIDE 10 MG: 10 TABLET, FILM COATED, EXTENDED RELEASE ORAL at 22:43

## 2021-05-17 RX ADMIN — SODIUM CHLORIDE, PRESERVATIVE FREE 300 UNITS: 5 INJECTION INTRAVENOUS at 23:00

## 2021-05-17 RX ADMIN — Medication 10 ML: at 23:00

## 2021-05-17 RX ADMIN — CILOSTAZOL 50 MG: 50 TABLET ORAL at 09:14

## 2021-05-17 RX ADMIN — GABAPENTIN 800 MG: 400 CAPSULE ORAL at 14:26

## 2021-05-17 RX ADMIN — Medication 10 ML: at 09:15

## 2021-05-17 RX ADMIN — HYDRALAZINE HYDROCHLORIDE 25 MG: 25 TABLET, FILM COATED ORAL at 22:43

## 2021-05-17 RX ADMIN — CLONIDINE HYDROCHLORIDE 0.1 MG: 0.1 TABLET ORAL at 22:42

## 2021-05-17 RX ADMIN — CHLORTHALIDONE 25 MG: 25 TABLET ORAL at 09:15

## 2021-05-17 RX ADMIN — HYDROMORPHONE HYDROCHLORIDE 1 MG: 1 INJECTION, SOLUTION INTRAMUSCULAR; INTRAVENOUS; SUBCUTANEOUS at 13:06

## 2021-05-17 RX ADMIN — HYDROMORPHONE HYDROCHLORIDE 1 MG: 1 INJECTION, SOLUTION INTRAMUSCULAR; INTRAVENOUS; SUBCUTANEOUS at 05:57

## 2021-05-17 RX ADMIN — Medication 10 ML: at 09:17

## 2021-05-17 RX ADMIN — INSULIN LISPRO 3 UNITS: 100 INJECTION, SOLUTION INTRAVENOUS; SUBCUTANEOUS at 13:08

## 2021-05-17 RX ADMIN — DULOXETINE HYDROCHLORIDE 120 MG: 60 CAPSULE, DELAYED RELEASE ORAL at 09:14

## 2021-05-17 RX ADMIN — CLONIDINE HYDROCHLORIDE 0.1 MG: 0.1 TABLET ORAL at 09:14

## 2021-05-17 RX ADMIN — OXYCODONE HYDROCHLORIDE 10 MG: 10 TABLET, FILM COATED, EXTENDED RELEASE ORAL at 09:30

## 2021-05-17 RX ADMIN — GABAPENTIN 800 MG: 400 CAPSULE ORAL at 16:42

## 2021-05-17 RX ADMIN — CILOSTAZOL 50 MG: 50 TABLET ORAL at 22:42

## 2021-05-17 RX ADMIN — INSULIN LISPRO 2 UNITS: 100 INJECTION, SOLUTION INTRAVENOUS; SUBCUTANEOUS at 22:43

## 2021-05-17 RX ADMIN — METOPROLOL TARTRATE 25 MG: 25 TABLET, FILM COATED ORAL at 22:42

## 2021-05-17 RX ADMIN — HYDRALAZINE HYDROCHLORIDE 25 MG: 25 TABLET, FILM COATED ORAL at 09:15

## 2021-05-17 RX ADMIN — HYDROMORPHONE HYDROCHLORIDE 1 MG: 1 INJECTION, SOLUTION INTRAMUSCULAR; INTRAVENOUS; SUBCUTANEOUS at 09:15

## 2021-05-17 RX ADMIN — MIRTAZAPINE 7.5 MG: 15 TABLET, FILM COATED ORAL at 22:42

## 2021-05-17 RX ADMIN — CLONIDINE HYDROCHLORIDE 0.1 MG: 0.1 TABLET ORAL at 14:25

## 2021-05-17 RX ADMIN — Medication 1 CAPSULE: at 09:30

## 2021-05-17 RX ADMIN — INSULIN LISPRO 15 UNITS: 100 INJECTION, SOLUTION INTRAVENOUS; SUBCUTANEOUS at 13:10

## 2021-05-17 RX ADMIN — AMLODIPINE BESYLATE 10 MG: 10 TABLET ORAL at 09:14

## 2021-05-17 RX ADMIN — GABAPENTIN 800 MG: 400 CAPSULE ORAL at 22:42

## 2021-05-17 RX ADMIN — METOPROLOL TARTRATE 25 MG: 25 TABLET, FILM COATED ORAL at 09:35

## 2021-05-17 RX ADMIN — OXYCODONE 5 MG: 5 TABLET ORAL at 14:26

## 2021-05-17 RX ADMIN — SODIUM CHLORIDE, POTASSIUM CHLORIDE, SODIUM LACTATE AND CALCIUM CHLORIDE: 600; 310; 30; 20 INJECTION, SOLUTION INTRAVENOUS at 16:42

## 2021-05-17 RX ADMIN — HYDRALAZINE HYDROCHLORIDE 25 MG: 25 TABLET, FILM COATED ORAL at 14:26

## 2021-05-17 ASSESSMENT — PAIN DESCRIPTION - DESCRIPTORS
DESCRIPTORS: ACHING;CONSTANT;DISCOMFORT
DESCRIPTORS: ACHING;CONSTANT;DISCOMFORT

## 2021-05-17 ASSESSMENT — PAIN SCALES - GENERAL
PAINLEVEL_OUTOF10: 9
PAINLEVEL_OUTOF10: 10
PAINLEVEL_OUTOF10: 10
PAINLEVEL_OUTOF10: 8

## 2021-05-17 ASSESSMENT — PAIN DESCRIPTION - ONSET: ONSET: ON-GOING

## 2021-05-17 ASSESSMENT — PAIN DESCRIPTION - PAIN TYPE
TYPE: SURGICAL PAIN
TYPE: SURGICAL PAIN

## 2021-05-17 ASSESSMENT — PAIN DESCRIPTION - LOCATION
LOCATION: HIP
LOCATION: HIP

## 2021-05-17 ASSESSMENT — PAIN DESCRIPTION - FREQUENCY: FREQUENCY: CONTINUOUS

## 2021-05-17 ASSESSMENT — LIFESTYLE VARIABLES: SMOKING_STATUS: 1

## 2021-05-17 ASSESSMENT — PAIN DESCRIPTION - ORIENTATION: ORIENTATION: RIGHT

## 2021-05-17 NOTE — PROGRESS NOTES
Vascular Surgery Progress Note    Pt is being seen in f/u today regarding RLE AKA wound debridement 5/14    Subjective  Pt s/e. Denies any issues overnight. Pain controlled when he stays on top of his pain medications.      Current Medications:    sodium chloride      sodium chloride      lactated ringers 100 mL/hr at 05/16/21 0515    dextrose        oxyCODONE **OR** oxyCODONE, HYDROmorphone, sodium chloride flush, sodium chloride, heparin flush, sodium chloride flush, sodium chloride, promethazine **OR** ondansetron, glucose, dextrose, glucagon (rDNA), dextrose    fentanNYL  100 mcg Intravenous See Admin Instructions    daptomycin (CUBICIN) IVPB  6 mg/kg (Ideal) Intravenous Q24H    lactobacillus  1 capsule Oral Daily    lidocaine PF  5 mL Intradermal Once    sodium chloride flush  5-40 mL Intravenous 2 times per day    heparin flush  3 mL Intravenous 2 times per day    diphenhydrAMINE  25 mg Intravenous Once    cloNIDine  0.1 mg Oral TID    cilostazol  50 mg Oral BID    amLODIPine  10 mg Oral Daily    hydrALAZINE  25 mg Oral TID    gabapentin  800 mg Oral 4x Daily    metoprolol tartrate  25 mg Oral BID    insulin lispro  15 Units Subcutaneous TID AC    oxyCODONE  10 mg Oral 2 times per day    chlorthalidone  25 mg Oral Daily    mirtazapine  7.5 mg Oral Nightly    DULoxetine  120 mg Oral Daily    sodium chloride flush  5-40 mL Intravenous 2 times per day    enoxaparin  40 mg Subcutaneous Daily    insulin lispro  0-18 Units Subcutaneous TID WC    insulin lispro  0-9 Units Subcutaneous Nightly    cefepime  2,000 mg Intravenous 30 Min Pre-Op        PHYSICAL EXAM:    /60   Pulse 81   Temp 97.6 °F (36.4 °C) (Temporal)   Resp 18   Ht 6' 2\" (1.88 m)   Wt 245 lb (111.1 kg)   SpO2 94%   BMI 31.46 kg/m²     Intake/Output Summary (Last 24 hours) at 5/17/2021 0616  Last data filed at 5/17/2021 0550  Gross per 24 hour   Intake 2583 ml   Output 850 ml   Net 1733 ml          Gen: awake, alert and oriented x3, uncomfortable   CVS: RR  Resp: No increased work of breathing  Abd: Soft, non-tender, non-distended  R LE: wound dressing changed with some granulation tissue and some areas of small hematoma.     LABS:    Lab Results   Component Value Date    WBC 9.6 05/17/2021    HGB 9.2 (L) 05/17/2021    HCT 28.8 (L) 05/17/2021     05/17/2021    PROTIME 11.3 05/12/2021    INR 1.0 05/12/2021    APTT 32.7 05/12/2021    K 3.8 05/17/2021    BUN 24 (H) 05/17/2021    CREATININE 1.8 (H) 05/17/2021       A/P  63 y.o. male s/p RLE AKA with wound debridement and revision of AKA on 5/14     - daily dressing changes  - continue antibiotics per ID  - continue diet   - will discuss take back timing for further closure vs wound vac management with Dr. Chinyere Daley MD

## 2021-05-17 NOTE — ANESTHESIA PRE PROCEDURE
Department of Anesthesiology  Preprocedure Note       Name:  Omid Myles   Age:  63 y.o.  :  1955                                          MRN:  [id number]         Date:  2021      Surgeon: Cooper Garcia):  Lynette Monge MD    Procedure: Procedure(s):  DEBRIDEMENT ABOVE THE KNEE AMPUTATION ,POSSIBLE  CLOUSURE, POSSIBLE WOUND VAC APPLICATION    Medications prior to admission:   Prior to Admission medications    Medication Sig Start Date End Date Taking? Authorizing Provider   DULoxetine (CYMBALTA) 60 MG extended release capsule TAKE TWO CAPSULES BY MOUTH EVERY MORNING 5/3/21  Yes Juanito Kline,    chlorthalidone (HYGROTON) 25 MG tablet TAKE 1 TABLET BY MOUTH ONE TIME A DAY 21  Yes Juanito Kline DO   mirtazapine (REMERON) 7.5 MG tablet TAKE 1 TABLET BY MOUTH ONE TIME A DAY NIGHTLY 21  Yes Juanito Kline DO   oxyCODONE (OXYCONTIN) 10 MG extended release tablet Take 1 tablet by mouth every 12 hours as needed for Pain for up to 30 days. Patient taking differently: Take 10 mg by mouth every 6 hours as needed for Pain. 21 Yes Juanito Kline, DO   insulin lispro (HUMALOG) 100 UNIT/ML injection vial Inject 15 Units into the skin 3 times daily (before meals)  Patient taking differently: Inject 12 Units into the skin 3 times daily (before meals) For every 20 units above 200 mg/dL patient takes an additional 2 units 21  Yes Juanito Kline, DO   ammonium lactate (LAC-HYDRIN) 12 % lotion Apply topically as needed. 21  Yes Juanito Kline,    metoprolol tartrate (LOPRESSOR) 25 MG tablet Take 1 tablet by mouth 2 times daily 21  Yes Juanito Kline, DO   gabapentin (NEURONTIN) 800 MG tablet Take 1 tablet by mouth 4 times daily for 30 days.  21 Yes Juanito Kline DO   hydrALAZINE (APRESOLINE) 25 MG tablet Take 1 tablet by mouth 3 times daily 21  Yes Juanito Kline DO   amLODIPine (NORVASC) 10 MG tablet Take 1 tablet by mouth daily 21  Yes Juanito Kline, DO   cloNIDine (CATAPRES) 0.1 MG tablet TAKE ONE TABLET BY MOUTH THREE TIMES DAILY 1/15/21  Yes Juanito Kline,    cilostazol (PLETAL) 50 MG tablet Take 1 tablet by mouth 2 times daily 1/15/21  Yes Juanito Kline, DO   mineral oil-hydrophilic petrolatum (AQUAPHOR) ointment Apply topically twice daily dispense 14 ounce jar  Patient taking differently: Apply topically once a week  12/14/20  Yes Juanito Kline, DO   blood glucose test strips (ONETOUCH ULTRA) strip 1 each by Other route 3 times daily As needed.  12/4/20  Yes Juanito Kline, DO   Insulin Syringe-Needle U-100 (ULTICARE INSULIN SYRINGE) 31G X 5/16\" 0.3 ML MISC 1 each by Other route 5 times daily 12/4/20  Yes Juanito Kline, DO   Handicap Placard MISC by Does not apply route Patient cannot walk 200 ft without stopping to rest.    Expiration 10/21/2024 10/21/19  Yes Juanito Kline DO       Current medications:    Current Facility-Administered Medications   Medication Dose Route Frequency Provider Last Rate Last Admin    fentaNYL (SUBLIMAZE) injection 100 mcg  100 mcg Intravenous See Admin Instructions Ervin Meadows MD        DAPTOmycin (CUBICIN) 500 mg in sodium chloride 0.9 % 50 mL IVPB  6 mg/kg (Ideal) Intravenous Q24H John MUSTAPHA Gibbs  mL/hr at 05/17/21 1802 500 mg at 05/17/21 1802    oxyCODONE (ROXICODONE) immediate release tablet 5 mg  5 mg Oral Q4H PRN Jaylan Carrasco MD   5 mg at 05/17/21 1426    Or    oxyCODONE HCl (OXY-IR) immediate release tablet 10 mg  10 mg Oral Q4H PRN Billie Miller MD        HYDROmorphone (DILAUDID) injection 1 mg  1 mg Intravenous Q3H PRN Billie Miller MD   1 mg at 05/17/21 1642    lactobacillus (CULTURELLE) capsule 1 capsule  1 capsule Oral Daily Iva Morales MD   1 capsule at 05/17/21 0930    lidocaine PF 1 % injection 5 mL  5 mL Intradermal Once Iva Morales MD        sodium chloride flush 0.9 % injection 5-40 mL  5-40 mL Intravenous 2 times per day Tania Rizo Eric Flores MD   10 mL at 05/17/21 0915    sodium chloride flush 0.9 % injection 5-40 mL  5-40 mL Intravenous PRN Gregory Og MD        0.9 % sodium chloride infusion  25 mL Intravenous PRN Gregory Og MD        heparin flush 100 UNIT/ML injection 300 Units  3 mL Intravenous 2 times per day Gregory Og MD   300 Units at 05/17/21 0915    heparin flush 100 UNIT/ML injection 300 Units  3 mL Intracatheter PRN Gregory Og MD        diphenhydrAMINE (BENADRYL) injection 25 mg  25 mg Intravenous Once Tamra [place], DO        cloNIDine (CATAPRES) tablet 0.1 mg  0.1 mg Oral TID Ruby Hameed MD   0.1 mg at 05/17/21 1425    cilostazol (PLETAL) tablet 50 mg  50 mg Oral BID Ruby Hameed MD   50 mg at 05/17/21 0914    amLODIPine (NORVASC) tablet 10 mg  10 mg Oral Daily Ruby Hameed MD   10 mg at 05/17/21 0914    hydrALAZINE (APRESOLINE) tablet 25 mg  25 mg Oral TID Ruby Hameed MD   25 mg at 05/17/21 1426    gabapentin (NEURONTIN) capsule 800 mg  800 mg Oral 4x Daily Ruby Hameed MD   800 mg at 05/17/21 1642    metoprolol tartrate (LOPRESSOR) tablet 25 mg  25 mg Oral BID Ruby Hameed MD   25 mg at 05/17/21 0935    insulin lispro (HUMALOG) injection vial 15 Units  15 Units Subcutaneous TID AC Ruby Hameed MD   15 Units at 05/17/21 1310    oxyCODONE (OXYCONTIN) extended release tablet 10 mg  10 mg Oral 2 times per day Ruby Hameed MD   10 mg at 05/17/21 0930    chlorthalidone (HYGROTON) tablet 25 mg  25 mg Oral Daily Ruby Hameed MD   25 mg at 05/17/21 0915    mirtazapine (REMERON) tablet 7.5 mg  7.5 mg Oral Nightly Ruby Hameed MD   7.5 mg at 05/16/21 2122    DULoxetine (CYMBALTA) extended release capsule 120 mg  120 mg Oral Daily Ruby Hameed MD   120 mg at 05/17/21 0914    sodium chloride flush 0.9 % injection 5-40 mL  5-40 mL Intravenous 2 times per day Sydnee Major Edwards MD   10 mL at 05/17/21 0917    sodium chloride flush 0.9 % injection 5-40 mL  5-40 mL Intravenous PRN Stephane Cardona MD        0.9 % sodium chloride infusion  25 mL Intravenous PRN Stephane Cardona MD        promethazine (PHENERGAN) tablet 12.5 mg  12.5 mg Oral Q6H PRN Stephane Cardona MD        Or    [facility]dansetron [facility] injection 4 mg  4 mg Intravenous Q6H PRN Stephane Cardona MD        enoxaparin (LOVENOX) injection 40 mg  40 mg Subcutaneous Daily Stephane Cardona MD   40 mg at 05/15/21 4761    lactated ringers infusion   Intravenous Continuous Stephane Cardona  mL/hr at 05/17/21 1642 New Bag at 05/17/21 1642    insulin lispro (HUMALOG) injection vial 0-18 Units  0-18 Units Subcutaneous TID WC Stephane Cardona MD   3 Units at 05/17/21 1308    insulin lispro (HUMALOG) injection vial 0-9 Units  0-9 Units Subcutaneous Nightly Stephane Cardona MD   2 Units at 05/16/21 2123    cefepime (MAXIPIME) 2000 mg IVPB minibag  2,000 mg Intravenous 30 Min Pre-Op Stephane Cardona MD   2,000 mg at 05/14/21 1745    glucose (GLUTOSE) 40 % oral gel 15 g  15 g Oral PRN Stephane Cardona MD        dextrose 50 % IV solution  12.5 g Intravenous PRN Stephane Cardona MD        glucagon (rDNA) injection 1 mg  1 mg Intramuscular PRN Stephane Cardona MD        dextrose 5 % solution  100 mL/hr Intravenous PRN Stephane Cardona MD           Allergies:  No Known Allergies    Problem List:    Patient Active Problem List   Diagnosis Code    DM II (diabetes mellitus, type II), controlled ([address] E11.9    HTN (hypertension) I10    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 8XXA, L08.9    Postoperative wound infection T81.49XA    Ischemic ulcer of right thigh with fat layer exposed ([address] L97.112    Ischemic ulcer of right thigh with necrosis of muscle ([address] L97.113    Above knee amputation of right lower extremity (HCC) X28.913W    Postoperative pain G89.18    Disruption of closure of muscle or muscle flap, sequela T81.32XS    Chronic pain syndrome G89.4    AKA stump complication (HCC) Q85.6       Past Medical History:        Diagnosis Date    Acute kidney injury ([address] 5/18/2020    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 ([address] 5/22/2018    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 ([address] 5/21/2018    Cellulitis, scrotum 3/20/2020    Critical lower limb ischemia 3/20/2020    Diabetes mellitus ([address]     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 2 diabetes mellitus, with fat layer exposed ([address] 5/22/2018    Disruption of closure of muscle or muscle flap, sequela 8/26/2020    DVT, lower extremity ([address]     right leg     Encounter for dental examination and cleaning with abnormal findings 4/2/2018    Gas gangrene of thigh ([address] 3/20/2020    History of DVT (deep vein thrombosis) 7/31/2018    Hyperglycemia due to type 2 diabetes mellitus ([address] 3/20/2020    Hyperlipidemia     Hypertension     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[address]     Osteomyelitis ([address] 10/24/2018    PVD (peripheral vascular disease) ([address]        Past Surgical History:        Procedure Laterality Date    AMPUTATION ABOVE KNEE Right 4/28/2020    DEBRIDEMENT OF AMPUTATION RIGHT ABOVE KNEE performed by Severa Hatch, MD at [place] [address] AMPUTATION ABOVE KNEE Right 4/30/2020    DEBRIDEMENT OF AMPUTATION RIGHT ABOVE KNEE,  POSS. ABOVE KNEE REVISION, POSS. CLOSURE, POSS.WOUND VAC -- BEN Robles / Nick Fine TO LOOK IN performed by Nadira Juárez MD at [place] [address] Right 3/20/2020    RIGHT LOWER EXTREMITY THROMBECTOMY, POSSIBLE ANGIOGRAM, POSSIBLE INTERVENTION, POSSIBLE BYPASS. performed by Nadira Juárez MD at [address] [place] [address] Right 4/17/2020    RIGHT LEG DEBRIDEMENT INCISION AND DRAINAGE performed by Nataliya Gray MD at [address] [place] [address] Right 4/20/2020    RIGHT THIGH WOUND DRESSING CHANGE; DEBRIDEMENT, removal of muscle performed by Nataliya Gray MD at [address] [place] [address] Right 4/21/2020    RIGHT THIGH WOUND DRESSING CHANGE POSSIBLE  DEBRIDEMENT - NEEDS BEN Robles / JANETTE TO SEE performed by Bam Peguero MD at [address] [place] [address] Right 4/23/2020    RIGHT THIGH WOUND DRESSING CHANGE and DEBRIDEMENT performed by Nadira Juárez MD at [address] [place] [address] Right 10/15/2020    DEBRIDEMENT RIGHT ABOVE KNEE AMPUTATION POSS. REVISION POSS. WOUND VAC performed by Nadira Juárez MD at [address] [place] [address] Right 12/17/2020    DEBRIDEMENT  RIGHT ABOVE KNEE AMPUTATION WITH POSS.  ADVANCED SKIN THERAPY performed by Nadira Juárez MD at [address] [place] [address] Right 5/14/2021    DEBRIDEMENT RIGHT ABOVE KNEE AMPUTATION performed by Nataliya Gray MD at [address] [place] [address] Right 11/1/2018    AMPUTATION ABOVE KNEE RIGHT LEG performed by Nadira Juárez MD at [address] [place] [address] Right 5/24/2018    RIGHT FOOT INCISION AND DRAINAGE WITH PARTIAL BONE RESECTION performed by Nat Donahue DPM at [facility] OFFICE/OUTPT VISIT,PROCEDURE ONLY Right 8/3/2018    INCISION AND DRAINAGE MULTIPLE AREAS RIGHT FOOT WITH DEBRIDEMENT SOFT TISSUE performed by Kathee Libman DARSHANA Moraes at [facility] Right     leg        Social History:    Social History     Tobacco Use    Smoking status: Current Every Day Smoker     Packs/day: 0.50     Years: 7.00     Pack years: 3.50     Types: Cigarettes    Smokeless tobacco: Never Used   Substance Use Topics    Alcohol use: No                                Ready to quit: Not Answered  Counseling given: Not Answered      Vital Signs (Current):   Vitals:    05/16/21 1651 05/16/21 1930 05/17/21 0900 05/17/21 1415   BP: 110/70 132/60 (!) 140/73 114/60   Pulse: 80 81 72 68   Resp: 18 18 18 18   Temp: 36.1 °C (97 °F) 36.4 °C (97.6 °F) 36.7 °C (98.1 °F) 36.9 °C (98.4 °F)   TempSrc:  Temporal Oral Temporal   SpO2:  94% 95% 92%   Weight:       Height:                                                  BP Readings from Last 3 Encounters:   05/17/21 114/60   05/14/21 105/72   05/12/21 124/80       NPO Status: Time of last liquid consumption: 0935                        Time of last solid consumption: 1955                        Date of last liquid consumption: 05/14/21                        Date of last solid food consumption: 05/13/21    BMI:   Wt Readings from Last 3 Encounters:   05/12/21 245 lb (111.1 kg)   04/28/21 245 lb (111.1 kg)   03/24/21 245 lb (111.1 kg)     Body mass index is 31.46 kg/m².     CBC:   Lab Results   Component Value Date    WBC 9.6 05/17/2021    RBC 3.60 05/17/2021    HGB 9.2 05/17/2021    HCT 28.8 05/17/2021    MCV 80.0 05/17/2021    RDW 14.3 05/17/2021     05/17/2021       CMP:   Lab Results   Component Value Date     05/17/2021    K 3.8 05/17/2021    K 4.3 05/12/2021    CL 97 05/17/2021    CO2 28 05/17/2021    BUN 24 05/17/2021    CREATININE 1.8 05/17/2021    GFRAA 46 05/17/2021    LABGLOM 46 05/17/2021    GLUCOSE 210 05/17/2021    PROT 8.6 05/12/2021    CALCIUM 8.4 05/17/2021    BILITOT 0.2 05/12/2021    ALKPHOS 124 05/12/2021    AST 11 05/12/2021    ALT 6 05/12/2021       POC Tests: No results for input(s): POCGLU, POCNA, POCK, POCCL, POCBUN, POCHEMO, POCHCT in the last 72 hours. Coags:   Lab Results   Component Value Date    PROTIME 11.3 05/12/2021    INR 1.0 05/12/2021    APTT 32.7 05/12/2021       HCG (If Applicable): No results found for: PREGTESTUR, PREGSERUM, HCG, HCGQUANT     ABGs:   Lab Results   Component Value Date    YOE0ONH 22.8 03/20/2020        Type & Screen (If Applicable):  No results found for: LABABO, LABRH    Drug/Infectious Status (If Applicable):  No results found for: HIV, HEPCAB    COVID-19 Screening (If Applicable):   Lab Results   Component Value Date    COVID19 Not Detected 12/10/2020     4/16/2020  Ventricular Rate  79  BPM  Final  04/16/2020  2:21 PM  [facility]    Atrial Rate  79  BPM  Final  04/16/2020  2:21 PM  [facility]    P-R Interval  244  ms  Final  04/16/2020  2:21 PM  [facility]    QRS Duration  98  ms  Final  04/16/2020  2:21 PM  [facility]    Q-T Interval  426  ms  Final  04/16/2020  2:21 PM  [facility]    QTc Calculation (Bazett)  488  ms  Final  04/16/2020  2:21 PM  [facility]    P Axis  21  degrees  Final  04/16/2020  2:21 PM  [facility]    R Axis  14  degrees  Final  04/16/2020  2:21 PM  [facility]    T Axis  46  degrees  Final  04/16/2020  2:21 PM  [facility]    Testing Performed By     Lab - [address]  Name  Director  Address  Valid Date Range    360-[facility]  [facility] MUSE  Unknown  Unknown  04/18/16 0721-Present    Narrative & Impression     Sinus rhythm with 1st degree AV block with premature supraventricular complexes  Nonspecific T wave abnormality  Abnormal ECG  Confirmed by Ewa Smith ([id number]) on 4/17/2020 3:56:14 PM     Chest X-ray 06/04/2020  Impression         1. Tip of the left arm PICC line is at the level of the proximal SVC. 2. Small left basilar opacity, which may represent atelectasis,    pneumonia, effusion or combination thereof.       ECHO 03/24/20   Findings      Left Ventricle   Overall ejection fraction is normal.      Right Ventricle   Normal right ventricle structure and function. Left Atrium   Normal left atrium. No evidence of thrombus within left atrium or appendage. Agitated saline injected for shunt evaluation. No evidence of patent foramen ovale. No evidence of atrial septal defect. Right Atrium   Normal right atrium. Mitral Valve   Structurally normal mitral valve. No evidence of mitral valve stenosis. Mild mitral regurgitation is present. Tricuspid Valve   The tricuspid valve appears structurally normal.   Mild tricuspid regurgitation. Aortic Valve   The aortic valve is trileaflet. No hemodynamically significant aortic   stenosis is present. No evidence of aortic valve regurgitation. Pulmonic Valve   Pulmonic valve is structurally normal.      Pericardial Effusion   No evidence for hemodynamically significant pericardial effusion. Aorta   Normal aortic root and ascending aorta. Conclusions      Summary   No source of embolus found. No intra-cardiac mass, thrombus or vegetations. Overall ejection fraction is normal.   Normal right ventricle structure and function. Mild mitral regurgitation is present. Signature      ----------------------------------------------------------------   Electronically signed by Sadiq Padilla DO(Interpreting   physician) on 03/24/2020 10:06 AM      Anesthesia Evaluation  Patient summary reviewed and Nursing notes reviewed no history of anesthetic complications:   Airway: Mallampati: III  TM distance: >3 FB   Neck ROM: full  Mouth opening: > = 3 FB Dental:          Pulmonary:   (+) decreased breath sounds,  current smoker                           Cardiovascular:    (+) hypertension:, hyperlipidemia      ECG reviewed  Rhythm: regular  Rate: normal  Echocardiogram reviewed               ROS comment: Echo 03/2020 Summary   No source of embolus found. No intra-cardiac mass, thrombus or vegetations.       Overall ejection fraction is normal.   Normal right

## 2021-05-17 NOTE — PROGRESS NOTES
1124 ml   Output 1650 ml   Net -526 ml        Gen: awake, alert and oriented x3, comfortable at this time  CVS: RR  Resp: No increased work of breathing  Abd: Soft, non-distended  R LE: no strikethrough on dressing    LABS:    Lab Results   Component Value Date    WBC 9.6 05/17/2021    HGB 9.2 (L) 05/17/2021    HCT 28.8 (L) 05/17/2021     05/17/2021    PROTIME 11.3 05/12/2021    INR 1.0 05/12/2021    APTT 32.7 05/12/2021    K 3.8 05/17/2021    BUN 24 (H) 05/17/2021    CREATININE 1.8 (H) 05/17/2021       A/P  63 y.o. male s/p RLE AKA with wound debridement and revision of AKA on 5/14     - daily dressing changes  - continue antibiotics per ID  - continue diet   - OR take back timing for further closure vs wound vac management TBD, possibly tomorrow or Wednesday afternoon    [facility] [place]

## 2021-05-17 NOTE — PLAN OF CARE
Problem: Skin Integrity:  Goal: Will show no infection signs and symptoms  Description: Will show no infection signs and symptoms  5/17/2021 1234 by Teresa Matthews RN  Outcome: Met This Shift  5/16/2021 2356 by Kya Patterson RN  Outcome: Met This Shift  Goal: Absence of new skin breakdown  Description: Absence of new skin breakdown  Outcome: Met This Shift

## 2021-05-17 NOTE — CARE COORDINATION
Social Work Discharge/Planning:    SW met with patient to explain role and discuss transition of care. Patient reports being independent and able to drive prior to admission. Patient is from home. Patient lives alone in an apartment that has 1 step to enter through the front and 3 steps to enter through the back. Patient has a hospital bed, wc,ww, tub transfer bench, 3 in 1 commode, low air mattress, and Cpap that he wears at night. Patient has GALINDO/SNF hx with [facility]. Patient has past hx with [facility]. Patient is currently active with [facility] and will need KAROLINA orders. Patient's PCP is Dr. Keegan Johnson. Patient reports he also follows with Dr. Severa Aye. [facility]'s [facility]. Patient reports his plan is to return home with [facility]. Patient reports he is able to call for a ride day of discharge. NIKKI/NOEMY to follow.     Ori Bradshaw, [facility]  [phone]

## 2021-05-17 NOTE — PLAN OF CARE
Problem: Skin Integrity:  Goal: Will show no infection signs and symptoms  Description: Will show no infection signs and symptoms  Outcome: Met This Shift     Problem: Falls - Risk of:  Goal: Will remain free from falls  Description: Will remain free from falls  Outcome: Met This Shift     Problem: Pain:  Goal: Pain level will decrease  Description: Pain level will decrease  Outcome: Met This Shift  Goal: Control of acute pain  Description: Control of acute pain  Outcome: Met This Shift

## 2021-05-18 ENCOUNTER — ANESTHESIA (OUTPATIENT)
Dept: OPERATING ROOM | Age: 66
DRG: 498 | End: 2021-05-18
Payer: COMMERCIAL

## 2021-05-18 VITALS — OXYGEN SATURATION: 92 % | DIASTOLIC BLOOD PRESSURE: 78 MMHG | SYSTOLIC BLOOD PRESSURE: 133 MMHG

## 2021-05-18 LAB
METER GLUCOSE: 175 MG/DL (ref 74–99)
METER GLUCOSE: 208 MG/DL (ref 74–99)
METER GLUCOSE: 218 MG/DL (ref 74–99)
METER GLUCOSE: 224 MG/DL (ref 74–99)

## 2021-05-18 PROCEDURE — 6360000002 HC RX W HCPCS: Performed by: STUDENT IN AN ORGANIZED HEALTH CARE EDUCATION/TRAINING PROGRAM

## 2021-05-18 PROCEDURE — 12035 INTMD RPR S/A/T/EXT 12.6-20: CPT | Performed by: SURGERY

## 2021-05-18 PROCEDURE — 1200000000 HC SEMI PRIVATE

## 2021-05-18 PROCEDURE — 2580000003 HC RX 258: Performed by: SPECIALIST

## 2021-05-18 PROCEDURE — 6370000000 HC RX 637 (ALT 250 FOR IP): Performed by: STUDENT IN AN ORGANIZED HEALTH CARE EDUCATION/TRAINING PROGRAM

## 2021-05-18 PROCEDURE — 2709999900 HC NON-CHARGEABLE SUPPLY: Performed by: SURGERY

## 2021-05-18 PROCEDURE — 2500000003 HC RX 250 WO HCPCS

## 2021-05-18 PROCEDURE — 3600000012 HC SURGERY LEVEL 2 ADDTL 15MIN: Performed by: SURGERY

## 2021-05-18 PROCEDURE — 6370000000 HC RX 637 (ALT 250 FOR IP): Performed by: SURGERY

## 2021-05-18 PROCEDURE — 3700000001 HC ADD 15 MINUTES (ANESTHESIA): Performed by: SURGERY

## 2021-05-18 PROCEDURE — 82962 GLUCOSE BLOOD TEST: CPT

## 2021-05-18 PROCEDURE — 2580000003 HC RX 258: Performed by: STUDENT IN AN ORGANIZED HEALTH CARE EDUCATION/TRAINING PROGRAM

## 2021-05-18 PROCEDURE — 3600000002 HC SURGERY LEVEL 2 BASE: Performed by: SURGERY

## 2021-05-18 PROCEDURE — 6360000002 HC RX W HCPCS: Performed by: SPECIALIST

## 2021-05-18 PROCEDURE — 7100000001 HC PACU RECOVERY - ADDTL 15 MIN: Performed by: SURGERY

## 2021-05-18 PROCEDURE — 3700000000 HC ANESTHESIA ATTENDED CARE: Performed by: SURGERY

## 2021-05-18 PROCEDURE — 2580000003 HC RX 258: Performed by: SURGERY

## 2021-05-18 PROCEDURE — 6360000002 HC RX W HCPCS

## 2021-05-18 PROCEDURE — 0QBB0ZZ EXCISION OF RIGHT LOWER FEMUR, OPEN APPROACH: ICD-10-PCS | Performed by: SURGERY

## 2021-05-18 PROCEDURE — 2580000003 HC RX 258

## 2021-05-18 PROCEDURE — 6370000000 HC RX 637 (ALT 250 FOR IP): Performed by: SPECIALIST

## 2021-05-18 PROCEDURE — 7100000000 HC PACU RECOVERY - FIRST 15 MIN: Performed by: SURGERY

## 2021-05-18 RX ORDER — GLYCOPYRROLATE 1 MG/5 ML
SYRINGE (ML) INTRAVENOUS PRN
Status: DISCONTINUED | OUTPATIENT
Start: 2021-05-18 | End: 2021-05-18 | Stop reason: SDUPTHER

## 2021-05-18 RX ORDER — MIDAZOLAM HYDROCHLORIDE 1 MG/ML
INJECTION INTRAMUSCULAR; INTRAVENOUS PRN
Status: DISCONTINUED | OUTPATIENT
Start: 2021-05-18 | End: 2021-05-18 | Stop reason: SDUPTHER

## 2021-05-18 RX ORDER — MORPHINE SULFATE 2 MG/ML
2 INJECTION, SOLUTION INTRAMUSCULAR; INTRAVENOUS EVERY 5 MIN PRN
Status: DISCONTINUED | OUTPATIENT
Start: 2021-05-18 | End: 2021-05-18

## 2021-05-18 RX ORDER — KETAMINE HCL IN NACL, ISO-OSM 100MG/10ML
SYRINGE (ML) INJECTION PRN
Status: DISCONTINUED | OUTPATIENT
Start: 2021-05-18 | End: 2021-05-18 | Stop reason: SDUPTHER

## 2021-05-18 RX ORDER — SODIUM CHLORIDE 9 MG/ML
INJECTION, SOLUTION INTRAVENOUS CONTINUOUS PRN
Status: DISCONTINUED | OUTPATIENT
Start: 2021-05-18 | End: 2021-05-18 | Stop reason: SDUPTHER

## 2021-05-18 RX ORDER — LIDOCAINE HYDROCHLORIDE 20 MG/ML
INJECTION, SOLUTION INFILTRATION; PERINEURAL PRN
Status: DISCONTINUED | OUTPATIENT
Start: 2021-05-18 | End: 2021-05-18 | Stop reason: SDUPTHER

## 2021-05-18 RX ORDER — MORPHINE SULFATE 2 MG/ML
1 INJECTION, SOLUTION INTRAMUSCULAR; INTRAVENOUS EVERY 5 MIN PRN
Status: DISCONTINUED | OUTPATIENT
Start: 2021-05-18 | End: 2021-05-18

## 2021-05-18 RX ORDER — OXYCODONE HYDROCHLORIDE AND ACETAMINOPHEN 5; 325 MG/1; MG/1
1 TABLET ORAL
Status: DISCONTINUED | OUTPATIENT
Start: 2021-05-18 | End: 2021-05-18

## 2021-05-18 RX ORDER — FENTANYL CITRATE 50 UG/ML
INJECTION, SOLUTION INTRAMUSCULAR; INTRAVENOUS PRN
Status: DISCONTINUED | OUTPATIENT
Start: 2021-05-18 | End: 2021-05-18 | Stop reason: SDUPTHER

## 2021-05-18 RX ORDER — PROPOFOL 10 MG/ML
INJECTION, EMULSION INTRAVENOUS CONTINUOUS PRN
Status: DISCONTINUED | OUTPATIENT
Start: 2021-05-18 | End: 2021-05-18 | Stop reason: SDUPTHER

## 2021-05-18 RX ORDER — ONDANSETRON 2 MG/ML
4 INJECTION INTRAMUSCULAR; INTRAVENOUS
Status: DISCONTINUED | OUTPATIENT
Start: 2021-05-18 | End: 2021-05-18

## 2021-05-18 RX ORDER — PROMETHAZINE HYDROCHLORIDE 25 MG/ML
6.25 INJECTION, SOLUTION INTRAMUSCULAR; INTRAVENOUS
Status: DISCONTINUED | OUTPATIENT
Start: 2021-05-18 | End: 2021-05-18

## 2021-05-18 RX ADMIN — Medication 30 MG: at 16:10

## 2021-05-18 RX ADMIN — INSULIN LISPRO 3 UNITS: 100 INJECTION, SOLUTION INTRAVENOUS; SUBCUTANEOUS at 18:53

## 2021-05-18 RX ADMIN — SODIUM CHLORIDE, PRESERVATIVE FREE 300 UNITS: 5 INJECTION INTRAVENOUS at 19:57

## 2021-05-18 RX ADMIN — SODIUM CHLORIDE: 9 INJECTION, SOLUTION INTRAVENOUS at 16:04

## 2021-05-18 RX ADMIN — Medication 20 MG: at 16:16

## 2021-05-18 RX ADMIN — GABAPENTIN 800 MG: 400 CAPSULE ORAL at 18:51

## 2021-05-18 RX ADMIN — CLONIDINE HYDROCHLORIDE 0.1 MG: 0.1 TABLET ORAL at 09:27

## 2021-05-18 RX ADMIN — CHLORTHALIDONE 25 MG: 25 TABLET ORAL at 09:28

## 2021-05-18 RX ADMIN — CLONIDINE HYDROCHLORIDE 0.1 MG: 0.1 TABLET ORAL at 13:47

## 2021-05-18 RX ADMIN — Medication 0.2 MG: at 16:10

## 2021-05-18 RX ADMIN — HYDROMORPHONE HYDROCHLORIDE 1 MG: 1 INJECTION, SOLUTION INTRAMUSCULAR; INTRAVENOUS; SUBCUTANEOUS at 22:12

## 2021-05-18 RX ADMIN — CILOSTAZOL 50 MG: 50 TABLET ORAL at 19:57

## 2021-05-18 RX ADMIN — HYDRALAZINE HYDROCHLORIDE 25 MG: 25 TABLET, FILM COATED ORAL at 13:48

## 2021-05-18 RX ADMIN — SODIUM CHLORIDE, PRESERVATIVE FREE 300 UNITS: 5 INJECTION INTRAVENOUS at 09:28

## 2021-05-18 RX ADMIN — PROPOFOL 75 MCG/KG/MIN: 10 INJECTION, EMULSION INTRAVENOUS at 16:10

## 2021-05-18 RX ADMIN — HYDROMORPHONE HYDROCHLORIDE 1 MG: 1 INJECTION, SOLUTION INTRAMUSCULAR; INTRAVENOUS; SUBCUTANEOUS at 00:13

## 2021-05-18 RX ADMIN — LIDOCAINE HYDROCHLORIDE 40 MG: 20 INJECTION, SOLUTION INFILTRATION; PERINEURAL at 16:09

## 2021-05-18 RX ADMIN — GABAPENTIN 800 MG: 400 CAPSULE ORAL at 19:57

## 2021-05-18 RX ADMIN — SODIUM CHLORIDE, POTASSIUM CHLORIDE, SODIUM LACTATE AND CALCIUM CHLORIDE: 600; 310; 30; 20 INJECTION, SOLUTION INTRAVENOUS at 04:25

## 2021-05-18 RX ADMIN — HYDRALAZINE HYDROCHLORIDE 25 MG: 25 TABLET, FILM COATED ORAL at 09:31

## 2021-05-18 RX ADMIN — Medication 10 ML: at 19:58

## 2021-05-18 RX ADMIN — AMLODIPINE BESYLATE 10 MG: 10 TABLET ORAL at 09:28

## 2021-05-18 RX ADMIN — DAPTOMYCIN 500 MG: 500 INJECTION, POWDER, LYOPHILIZED, FOR SOLUTION INTRAVENOUS at 18:52

## 2021-05-18 RX ADMIN — METOPROLOL TARTRATE 25 MG: 25 TABLET, FILM COATED ORAL at 19:58

## 2021-05-18 RX ADMIN — METOPROLOL TARTRATE 25 MG: 25 TABLET, FILM COATED ORAL at 09:27

## 2021-05-18 RX ADMIN — INSULIN LISPRO 3 UNITS: 100 INJECTION, SOLUTION INTRAVENOUS; SUBCUTANEOUS at 21:17

## 2021-05-18 RX ADMIN — SODIUM CHLORIDE, POTASSIUM CHLORIDE, SODIUM LACTATE AND CALCIUM CHLORIDE: 600; 310; 30; 20 INJECTION, SOLUTION INTRAVENOUS at 18:52

## 2021-05-18 RX ADMIN — FENTANYL CITRATE 50 MCG: 50 INJECTION, SOLUTION INTRAMUSCULAR; INTRAVENOUS at 17:07

## 2021-05-18 RX ADMIN — FENTANYL CITRATE 50 MCG: 50 INJECTION, SOLUTION INTRAMUSCULAR; INTRAVENOUS at 17:11

## 2021-05-18 RX ADMIN — MIDAZOLAM 2 MG: 1 INJECTION INTRAMUSCULAR; INTRAVENOUS at 16:16

## 2021-05-18 RX ADMIN — CLONIDINE HYDROCHLORIDE 0.1 MG: 0.1 TABLET ORAL at 19:57

## 2021-05-18 RX ADMIN — MIRTAZAPINE 7.5 MG: 15 TABLET, FILM COATED ORAL at 19:56

## 2021-05-18 RX ADMIN — OXYCODONE HYDROCHLORIDE 10 MG: 10 TABLET, FILM COATED, EXTENDED RELEASE ORAL at 09:27

## 2021-05-18 RX ADMIN — FENTANYL CITRATE 50 MCG: 50 INJECTION, SOLUTION INTRAMUSCULAR; INTRAVENOUS at 16:10

## 2021-05-18 RX ADMIN — HYDROMORPHONE HYDROCHLORIDE 1 MG: 1 INJECTION, SOLUTION INTRAMUSCULAR; INTRAVENOUS; SUBCUTANEOUS at 18:51

## 2021-05-18 RX ADMIN — GABAPENTIN 800 MG: 400 CAPSULE ORAL at 09:27

## 2021-05-18 RX ADMIN — DULOXETINE HYDROCHLORIDE 120 MG: 60 CAPSULE, DELAYED RELEASE ORAL at 09:27

## 2021-05-18 RX ADMIN — Medication 1 CAPSULE: at 09:28

## 2021-05-18 RX ADMIN — CILOSTAZOL 50 MG: 50 TABLET ORAL at 09:28

## 2021-05-18 RX ADMIN — OXYCODONE HYDROCHLORIDE 10 MG: 10 TABLET, FILM COATED, EXTENDED RELEASE ORAL at 19:58

## 2021-05-18 RX ADMIN — FENTANYL CITRATE 50 MCG: 50 INJECTION, SOLUTION INTRAMUSCULAR; INTRAVENOUS at 16:16

## 2021-05-18 RX ADMIN — HYDRALAZINE HYDROCHLORIDE 25 MG: 25 TABLET, FILM COATED ORAL at 19:57

## 2021-05-18 RX ADMIN — GABAPENTIN 800 MG: 400 CAPSULE ORAL at 13:47

## 2021-05-18 RX ADMIN — Medication 10 ML: at 09:27

## 2021-05-18 ASSESSMENT — PULMONARY FUNCTION TESTS
PIF_VALUE: 1
PIF_VALUE: 0
PIF_VALUE: 1
PIF_VALUE: 1
PIF_VALUE: 0
PIF_VALUE: 1
PIF_VALUE: 1
PIF_VALUE: 0
PIF_VALUE: 1
PIF_VALUE: 0
PIF_VALUE: 1
PIF_VALUE: 0
PIF_VALUE: 1
PIF_VALUE: 0
PIF_VALUE: 1

## 2021-05-18 ASSESSMENT — PAIN SCALES - GENERAL
PAINLEVEL_OUTOF10: 10
PAINLEVEL_OUTOF10: 7
PAINLEVEL_OUTOF10: 5

## 2021-05-18 NOTE — CARE COORDINATION
Social Work Discharge/Planning:    Patient is scheduled to go to OR today for irrigation and debridement possible closure possible wound vav with Dr. David Kilpatrick. If patient were to get wound vac, SW placed wound vac paperwork inside of patient's chart to be filled out and signed by Physician for wound vac. ID is following patient. Patient is currently on IV daptomycin 6mg /kg Q24 plus cefepime 2g IV Q12. Patient's plan remains to return home with [facility] once medically stable for discharge. Patient is able to call for a ride at time of discharge. NIKKI/NOEMY to follow.     [facility], [facility]  [phone]

## 2021-05-18 NOTE — PROGRESS NOTES
[address] [place] [address] [facility]  [facility]  Progress Note      C/C: Right hip stump wound infection       SUBJECTIVE:  Patient is tolerating medications. No reported adverse drug reactions. No nausea, vomiting, diarrhea. Reports pain in the wound   Afebrile   S/P OR today for irrigation and debridement possible closure possible wound vac   Review of systems:  As stated above in the chief complaint, otherwise negative.     Medications:  Scheduled Meds:   fentanNYL  100 mcg Intravenous See Admin Instructions    daptomycin (CUBICIN) IVPB  6 mg/kg (Ideal) Intravenous Q24H    lactobacillus  1 capsule Oral Daily    lidocaine PF  5 mL Intradermal Once    sodium chloride flush  5-40 mL Intravenous 2 times per day    heparin flush  3 mL Intravenous 2 times per day    diphenhydrAMINE  25 mg Intravenous Once    cloNIDine  0.1 mg Oral TID    cilostazol  50 mg Oral BID    amLODIPine  10 mg Oral Daily    hydrALAZINE  25 mg Oral TID    gabapentin  800 mg Oral 4x Daily    metoprolol tartrate  25 mg Oral BID    insulin lispro  15 Units Subcutaneous TID AC    oxyCODONE  10 mg Oral 2 times per day    chlorthalidone  25 mg Oral Daily    mirtazapine  7.5 mg Oral Nightly    DULoxetine  120 mg Oral Daily    sodium chloride flush  5-40 mL Intravenous 2 times per day    enoxaparin  40 mg Subcutaneous Daily    insulin lispro  0-18 Units Subcutaneous TID WC    insulin lispro  0-9 Units Subcutaneous Nightly    cefepime  2,000 mg Intravenous 30 Min Pre-Op     Continuous Infusions:   sodium chloride      sodium chloride      lactated ringers 100 mL/hr at 05/18/21 0425    dextrose       PRN Meds:oxyCODONE **OR** oxyCODONE, HYDROmorphone, sodium chloride flush, sodium chloride, heparin flush, sodium chloride flush, sodium chloride, promethazine **OR** ondansetron, glucose, dextrose, glucagon (rDNA), dextrose    OBJECTIVE:  BP (!) 148/72   Pulse 72   Temp 97.9 °F (36.6 °C) (Oral)   Resp 16   Ht 6' 2\" (1.88 m)   Wt 245 lb (111.1 kg) SpO2 99%   BMI 31.46 kg/m²   Temp  Av.4 °F (36.9 °C)  Min: 97.9 °F (36.6 °C)  Max: 99 °F (37.2 °C)  Constitutional: The patient is awake, alert, and oriented. Skin: Warm and dry. No rashes were noted. HEENT:No pallor, no icterus, no LN, no thrush . Neck: Supple to movements. Chest: Clear   Cardiovascular: S1 and S2 are rhythmic and regular. No murmurs appreciated.    Abdomen: Soft , non tender, bowel sound +  Extremities: Right hip stump wound - dressed  Lines: picc 21    Laboratory and Tests Review:  Lab Results   Component Value Date    WBC 9.6 2021    WBC 11.8 (H) 2021    WBC 10.6 2021    HGB 9.2 (L) 2021    HCT 28.8 (L) 2021    MCV 80.0 2021     2021     Lab Results   Component Value Date    NEUTROABS 7.99 (H) 2021    NEUTROABS 5.33 2020    NEUTROABS 9.02 (H) 2020     No results found for: [facility]  Lab Results   Component Value Date    ALT 6 2021    AST 11 2021    ALKPHOS 124 2021    BILITOT 0.2 2021     Lab Results   Component Value Date     2021    K 3.8 2021    K 4.3 2021    CL 97 2021    CO2 28 2021    BUN 24 2021    CREATININE 1.8 2021    CREATININE 1.9 2021    CREATININE 1.7 2021    GFRAA 46 2021    LABGLOM 46 2021    GLUCOSE 210 2021    PROT 8.6 2021    LABALBU 3.5 2021    CALCIUM 8.4 2021    BILITOT 0.2 2021    ALKPHOS 124 2021    AST 11 2021    ALT 6 2021     Lab Results   Component Value Date    CRP 2.9 (H) 2021    CRP 10.6 (H) 10/24/2018    CRP 2.1 (H) 2018     Lab Results   Component Value Date    SEDRATE 65 (H) 2021    SEDRATE 128 (H) 10/24/2018    SEDRATE 77 (H) 2018     Radiology:  Reviewed    Microbiology:   Lab Results   Component Value Date    BC 5 Days no growth 2021    BC 5 Days- no growth 2020    BC 5 Days- no growth 2020 ORG Staphylococcus aureus 05/12/2021    ORG Corynebacterium species 05/12/2021    ORG Staphylococcus aureus 12/17/2020     Lab Results   Component Value Date    BLOODCULT2 5 Days no growth 05/12/2021    BLOODCULT2 5 Days- no growth 04/16/2020    BLOODCULT2 5 Days- no growth 03/20/2020    ORG Staphylococcus aureus 05/12/2021    ORG Corynebacterium species 05/12/2021    ORG Staphylococcus aureus 12/17/2020     WOUND/ABSCESS   Date Value Ref Range Status   05/12/2021   Final    Heavy growth  Methicillin resistant Staph aureus isolated. Most Methicillin  resistant Staphylococcus are usually resistant to multiple  antibiotics including other B-Lactams, Aminoglycosides,  Macrolides, Clindamycin and Tetracycline. Contact isolation  is indicated. 05/12/2021 Light growth  Final   08/26/2020   Final    Rare growth  Methicillin resistant Staph aureus isolated. Most Methicillin  resistant Staphylococcus are usually resistant to multiple  antibiotics including other B-Lactams, Aminoglycosides,  Macrolides, Clindamycin and Tetracycline. Contact isolation  is indicated. No results found for: RESPSMEAR      Component Value Date/Time    FUNGSM No Fungal elements seen 12/17/2020 1342    LABFUNG No growth after 4 weeks of incubation. 12/17/2020 1342     No results found for: CULTRESP  No results found for: CXCATHTIP  No results found for: [facility]  Culture Surgical   Date Value Ref Range Status   12/17/2020   Final    Rare growth  Methicillin resistant Staph aureus isolated. Most Methicillin  resistant Staphylococcus are usually resistant to multiple  antibiotics including other B-Lactams, Aminoglycosides,  Macrolides, Clindamycin and Tetracycline. Contact isolation  is indicated. 10/15/2020   Final    Light growth  Methicillin resistant Staph aureus isolated.  Most Methicillin  resistant Staphylococcus are usually resistant to multiple  antibiotics including other B-Lactams, Aminoglycosides,  Macrolides, Clindamycin and Tetracycline. Contact isolation  is indicated. 04/21/2020 Moderate growth  Final   04/21/2020 Moderate growth  Final   04/21/2020 Moderate growth  Final     Urine Culture, Routine   Date Value Ref Range Status   04/19/2020 Growth not present  Final   03/20/2020 <10,000 CFU/mL  Gram negative rods   (A)  Final   03/20/2020 <10,000 CFU/ml  Final   03/20/2020 <10,000 CFU/ml  Final     No results found for: [address]       Wound cx 5/12 MRSA and corynebacterium  Baseline ESR 65, CRP 2.9      Assessment:  MRSA infection right hip stump  associated with deep structures including  osteomyelitis      Plan:    Cont daptomycin 6 mg /kg IV q 24 hrs 6 weeks then suppression  Lactobacillus  Wound care   For OR again today I&D possible wound closure  Check cultures    Electronically signed by Lazarus Mill, APRN - CNP on 5/18/2021 at 1:12 PM   Pt seen and examined. Above discussed agree with advanced practice nurse. Labs, cultures, and radiographs reviewed. Face to Face encounter occurred. Changes made as necessary.      Constantino Nieves MD

## 2021-05-18 NOTE — PROGRESS NOTES
Vascular Surgery Progress Note    Pt is being seen in f/u today regarding RLE AKA wound debridement 5/14    Subjective  Pt s/e. Denies any issues overnight. Pain controlled.       Current Medications:    sodium chloride      sodium chloride      lactated ringers 100 mL/hr at 05/18/21 0425    dextrose        oxyCODONE **OR** oxyCODONE, HYDROmorphone, sodium chloride flush, sodium chloride, heparin flush, sodium chloride flush, sodium chloride, promethazine **OR** ondansetron, glucose, dextrose, glucagon (rDNA), dextrose    fentanNYL  100 mcg Intravenous See Admin Instructions    daptomycin (CUBICIN) IVPB  6 mg/kg (Ideal) Intravenous Q24H    lactobacillus  1 capsule Oral Daily    lidocaine PF  5 mL Intradermal Once    sodium chloride flush  5-40 mL Intravenous 2 times per day    heparin flush  3 mL Intravenous 2 times per day    diphenhydrAMINE  25 mg Intravenous Once    cloNIDine  0.1 mg Oral TID    cilostazol  50 mg Oral BID    amLODIPine  10 mg Oral Daily    hydrALAZINE  25 mg Oral TID    gabapentin  800 mg Oral 4x Daily    metoprolol tartrate  25 mg Oral BID    insulin lispro  15 Units Subcutaneous TID AC    oxyCODONE  10 mg Oral 2 times per day    chlorthalidone  25 mg Oral Daily    mirtazapine  7.5 mg Oral Nightly    DULoxetine  120 mg Oral Daily    sodium chloride flush  5-40 mL Intravenous 2 times per day    enoxaparin  40 mg Subcutaneous Daily    insulin lispro  0-18 Units Subcutaneous TID WC    insulin lispro  0-9 Units Subcutaneous Nightly    cefepime  2,000 mg Intravenous 30 Min Pre-Op        PHYSICAL EXAM:    BP (!) 141/61   Pulse 70   Temp 99 °F (37.2 °C) (Oral)   Resp 18   Ht 6' 2\" (1.88 m)   Wt 245 lb (111.1 kg)   SpO2 95%   BMI 31.46 kg/m²     Intake/Output Summary (Last 24 hours) at 5/18/2021 0711  Last data filed at 5/18/2021 0634  Gross per 24 hour   Intake 1800 ml   Output 3000 ml   Net -1200 ml          Gen: awake, alert and oriented x3, uncomfortable   CVS:

## 2021-05-18 NOTE — PROGRESS NOTES
[address] [place] [address] [facility]  [facility]  Progress Note      C/C: Right hip stump wound infection       SUBJECTIVE:  Patient is tolerating medications. No reported adverse drug reactions. No nausea, vomiting, diarrhea. Reports pain in the wound   Afebrile   surg note read  Review of systems:  As stated above in the chief complaint, otherwise negative.     Medications:  Scheduled Meds:   fentanNYL  100 mcg Intravenous See Admin Instructions    daptomycin (CUBICIN) IVPB  6 mg/kg (Ideal) Intravenous Q24H    lactobacillus  1 capsule Oral Daily    lidocaine PF  5 mL Intradermal Once    sodium chloride flush  5-40 mL Intravenous 2 times per day    heparin flush  3 mL Intravenous 2 times per day    diphenhydrAMINE  25 mg Intravenous Once    cloNIDine  0.1 mg Oral TID    cilostazol  50 mg Oral BID    amLODIPine  10 mg Oral Daily    hydrALAZINE  25 mg Oral TID    gabapentin  800 mg Oral 4x Daily    metoprolol tartrate  25 mg Oral BID    insulin lispro  15 Units Subcutaneous TID AC    oxyCODONE  10 mg Oral 2 times per day    chlorthalidone  25 mg Oral Daily    mirtazapine  7.5 mg Oral Nightly    DULoxetine  120 mg Oral Daily    sodium chloride flush  5-40 mL Intravenous 2 times per day    enoxaparin  40 mg Subcutaneous Daily    insulin lispro  0-18 Units Subcutaneous TID WC    insulin lispro  0-9 Units Subcutaneous Nightly    cefepime  2,000 mg Intravenous 30 Min Pre-Op     Continuous Infusions:   sodium chloride      sodium chloride      lactated ringers 100 mL/hr at 05/17/21 1642    dextrose       PRN Meds:oxyCODONE **OR** oxyCODONE, HYDROmorphone, sodium chloride flush, sodium chloride, heparin flush, sodium chloride flush, sodium chloride, promethazine **OR** ondansetron, glucose, dextrose, glucagon (rDNA), dextrose    OBJECTIVE:  /60   Pulse 68   Temp 98.4 °F (36.9 °C) (Temporal)   Resp 18   Ht 6' 2\" (1.88 m)   Wt 245 lb (111.1 kg)   SpO2 92%   BMI 31.46 kg/m²   Temp 05/12/2021    ORG Corynebacterium species 05/12/2021    ORG Staphylococcus aureus 12/17/2020     Lab Results   Component Value Date    BLOODCULT2 5 Days no growth 05/12/2021    BLOODCULT2 5 Days- no growth 04/16/2020    BLOODCULT2 5 Days- no growth 03/20/2020    ORG Staphylococcus aureus 05/12/2021    ORG Corynebacterium species 05/12/2021    ORG Staphylococcus aureus 12/17/2020     WOUND/ABSCESS   Date Value Ref Range Status   05/12/2021   Final    Heavy growth  Methicillin resistant Staph aureus isolated. Most Methicillin  resistant Staphylococcus are usually resistant to multiple  antibiotics including other B-Lactams, Aminoglycosides,  Macrolides, Clindamycin and Tetracycline. Contact isolation  is indicated. 05/12/2021 Light growth  Final   08/26/2020   Final    Rare growth  Methicillin resistant Staph aureus isolated. Most Methicillin  resistant Staphylococcus are usually resistant to multiple  antibiotics including other B-Lactams, Aminoglycosides,  Macrolides, Clindamycin and Tetracycline. Contact isolation  is indicated. No results found for: RESPSMEAR      Component Value Date/Time    FUNGSM No Fungal elements seen 12/17/2020 1342    LABFUNG No growth after 4 weeks of incubation. 12/17/2020 1342     No results found for: CULTRESP  No results found for: CXCATHTIP  No results found for: [facility]  Culture Surgical   Date Value Ref Range Status   12/17/2020   Final    Rare growth  Methicillin resistant Staph aureus isolated. Most Methicillin  resistant Staphylococcus are usually resistant to multiple  antibiotics including other B-Lactams, Aminoglycosides,  Macrolides, Clindamycin and Tetracycline. Contact isolation  is indicated. 10/15/2020   Final    Light growth  Methicillin resistant Staph aureus isolated. Most Methicillin  resistant Staphylococcus are usually resistant to multiple  antibiotics including other B-Lactams, Aminoglycosides,  Macrolides, Clindamycin and Tetracycline.  Contact

## 2021-05-18 NOTE — OP NOTE
Operative Note      Patient: Clifford Yanez  YOB: 1955  MRN: [id number]    Date of Procedure: 5/18/2021    Pre-Op Diagnosis: R AKA wound    Post-Op Diagnosis: Same       Procedure :   Debridement R AKA  Interval complex closure of R AKA wound with muscle flaps  Application of wound vac as dressing    Surgeon(s):  Samantha Duggan MD    Assistant:   Resident: Rui Bhandari MD    Anesthesia: [facility]    Estimated Blood Loss (mL): less than 941     Complications: None    Specimens:   * No specimens in log *    Implants:  * No implants in log *      DESCRIPTION OF PROCEDURE: The patient was identified and the procedure was confirmed. The wound and surrounding area was prepped and draped in sterile fashion. With the patient in supine position, the wound was evaluated. Hemostasis was achieved. Wound was irrigated cleaned and dried. Flaps of the muscle were raised, and mobilized to allow for coverage over the bone. This was done using 2.0 vircyls in an inverted interrupted fashion. The remaining tissue was closed using vircyl sutures in figure of eight fashion. Skin was re approximated using 3.0 nylon in mattress fashion. A wound vac was than applied to incision over mepitel one. Steridrape applied, wound vac disc. Good seal, total incision length 15 cm long. Needle, sponge and instrument counts were reported as correct x2. The patient tolerated the procedure and was transferred to the recovery area in satisfactory condition.      Electronically signed by Samantha Duggan MD on 5/18/2021 at 5:34 PM

## 2021-05-19 LAB
METER GLUCOSE: 109 MG/DL (ref 74–99)
METER GLUCOSE: 136 MG/DL (ref 74–99)
METER GLUCOSE: 173 MG/DL (ref 74–99)
METER GLUCOSE: 83 MG/DL (ref 74–99)

## 2021-05-19 PROCEDURE — 6360000002 HC RX W HCPCS: Performed by: STUDENT IN AN ORGANIZED HEALTH CARE EDUCATION/TRAINING PROGRAM

## 2021-05-19 PROCEDURE — 1200000000 HC SEMI PRIVATE

## 2021-05-19 PROCEDURE — 2580000003 HC RX 258: Performed by: STUDENT IN AN ORGANIZED HEALTH CARE EDUCATION/TRAINING PROGRAM

## 2021-05-19 PROCEDURE — 82962 GLUCOSE BLOOD TEST: CPT

## 2021-05-19 PROCEDURE — 6360000002 HC RX W HCPCS: Performed by: SURGERY

## 2021-05-19 PROCEDURE — 6370000000 HC RX 637 (ALT 250 FOR IP): Performed by: STUDENT IN AN ORGANIZED HEALTH CARE EDUCATION/TRAINING PROGRAM

## 2021-05-19 RX ORDER — LACTOBACILLUS RHAMNOSUS GG 10B CELL
2 CAPSULE ORAL DAILY
Qty: 60 CAPSULE | Refills: 1 | Status: SHIPPED | OUTPATIENT
Start: 2021-05-20 | End: 2021-09-15

## 2021-05-19 RX ORDER — DAPTOMYCIN 50 MG/ML
500 INJECTION, POWDER, LYOPHILIZED, FOR SOLUTION INTRAVENOUS DAILY
Qty: 420 ML | Refills: 0 | Status: SHIPPED | OUTPATIENT
Start: 2021-05-19 | End: 2021-06-30

## 2021-05-19 RX ADMIN — SODIUM CHLORIDE, POTASSIUM CHLORIDE, SODIUM LACTATE AND CALCIUM CHLORIDE: 600; 310; 30; 20 INJECTION, SOLUTION INTRAVENOUS at 04:31

## 2021-05-19 RX ADMIN — INSULIN LISPRO 15 UNITS: 100 INJECTION, SOLUTION INTRAVENOUS; SUBCUTANEOUS at 12:12

## 2021-05-19 RX ADMIN — CLONIDINE HYDROCHLORIDE 0.1 MG: 0.1 TABLET ORAL at 14:20

## 2021-05-19 RX ADMIN — HYDROMORPHONE HYDROCHLORIDE 1 MG: 1 INJECTION, SOLUTION INTRAMUSCULAR; INTRAVENOUS; SUBCUTANEOUS at 08:07

## 2021-05-19 RX ADMIN — SODIUM CHLORIDE, PRESERVATIVE FREE 300 UNITS: 5 INJECTION INTRAVENOUS at 21:17

## 2021-05-19 RX ADMIN — INSULIN LISPRO 15 UNITS: 100 INJECTION, SOLUTION INTRAVENOUS; SUBCUTANEOUS at 08:40

## 2021-05-19 RX ADMIN — HYDROMORPHONE HYDROCHLORIDE 1 MG: 1 INJECTION, SOLUTION INTRAMUSCULAR; INTRAVENOUS; SUBCUTANEOUS at 04:31

## 2021-05-19 RX ADMIN — HYDROMORPHONE HYDROCHLORIDE 1 MG: 1 INJECTION, SOLUTION INTRAMUSCULAR; INTRAVENOUS; SUBCUTANEOUS at 18:22

## 2021-05-19 RX ADMIN — OXYCODONE HYDROCHLORIDE 10 MG: 10 TABLET, FILM COATED, EXTENDED RELEASE ORAL at 21:16

## 2021-05-19 RX ADMIN — GABAPENTIN 800 MG: 400 CAPSULE ORAL at 14:20

## 2021-05-19 RX ADMIN — HYDROMORPHONE HYDROCHLORIDE 1 MG: 1 INJECTION, SOLUTION INTRAMUSCULAR; INTRAVENOUS; SUBCUTANEOUS at 01:23

## 2021-05-19 RX ADMIN — HYDRALAZINE HYDROCHLORIDE 25 MG: 25 TABLET, FILM COATED ORAL at 21:17

## 2021-05-19 RX ADMIN — INSULIN LISPRO 3 UNITS: 100 INJECTION, SOLUTION INTRAVENOUS; SUBCUTANEOUS at 08:48

## 2021-05-19 RX ADMIN — CLONIDINE HYDROCHLORIDE 0.1 MG: 0.1 TABLET ORAL at 08:40

## 2021-05-19 RX ADMIN — SODIUM CHLORIDE, PRESERVATIVE FREE 300 UNITS: 5 INJECTION INTRAVENOUS at 08:40

## 2021-05-19 RX ADMIN — METOPROLOL TARTRATE 25 MG: 25 TABLET, FILM COATED ORAL at 08:39

## 2021-05-19 RX ADMIN — CLONIDINE HYDROCHLORIDE 0.1 MG: 0.1 TABLET ORAL at 21:16

## 2021-05-19 RX ADMIN — Medication 5 ML: at 09:35

## 2021-05-19 RX ADMIN — Medication 10 ML: at 08:40

## 2021-05-19 RX ADMIN — Medication 10 ML: at 21:17

## 2021-05-19 RX ADMIN — HYDRALAZINE HYDROCHLORIDE 25 MG: 25 TABLET, FILM COATED ORAL at 14:26

## 2021-05-19 RX ADMIN — DAPTOMYCIN 500 MG: 500 INJECTION, POWDER, LYOPHILIZED, FOR SOLUTION INTRAVENOUS at 17:53

## 2021-05-19 RX ADMIN — HYDROMORPHONE HYDROCHLORIDE 1 MG: 1 INJECTION, SOLUTION INTRAMUSCULAR; INTRAVENOUS; SUBCUTANEOUS at 14:19

## 2021-05-19 RX ADMIN — GABAPENTIN 800 MG: 400 CAPSULE ORAL at 08:38

## 2021-05-19 RX ADMIN — Medication 10 ML: at 21:49

## 2021-05-19 RX ADMIN — METOPROLOL TARTRATE 25 MG: 25 TABLET, FILM COATED ORAL at 21:16

## 2021-05-19 RX ADMIN — GABAPENTIN 800 MG: 400 CAPSULE ORAL at 21:16

## 2021-05-19 RX ADMIN — OXYCODONE HYDROCHLORIDE 10 MG: 10 TABLET, FILM COATED, EXTENDED RELEASE ORAL at 08:38

## 2021-05-19 RX ADMIN — GABAPENTIN 800 MG: 400 CAPSULE ORAL at 17:15

## 2021-05-19 RX ADMIN — MIRTAZAPINE 7.5 MG: 15 TABLET, FILM COATED ORAL at 21:16

## 2021-05-19 RX ADMIN — CILOSTAZOL 50 MG: 50 TABLET ORAL at 21:16

## 2021-05-19 RX ADMIN — HYDROMORPHONE HYDROCHLORIDE 1 MG: 1 INJECTION, SOLUTION INTRAMUSCULAR; INTRAVENOUS; SUBCUTANEOUS at 11:06

## 2021-05-19 RX ADMIN — CHLORTHALIDONE 25 MG: 25 TABLET ORAL at 08:39

## 2021-05-19 RX ADMIN — CILOSTAZOL 50 MG: 50 TABLET ORAL at 08:39

## 2021-05-19 RX ADMIN — INSULIN LISPRO 15 UNITS: 100 INJECTION, SOLUTION INTRAVENOUS; SUBCUTANEOUS at 17:15

## 2021-05-19 RX ADMIN — AMLODIPINE BESYLATE 10 MG: 10 TABLET ORAL at 08:38

## 2021-05-19 RX ADMIN — HYDROMORPHONE HYDROCHLORIDE 1 MG: 1 INJECTION, SOLUTION INTRAMUSCULAR; INTRAVENOUS; SUBCUTANEOUS at 17:15

## 2021-05-19 RX ADMIN — HYDRALAZINE HYDROCHLORIDE 25 MG: 25 TABLET, FILM COATED ORAL at 08:40

## 2021-05-19 RX ADMIN — DULOXETINE HYDROCHLORIDE 120 MG: 60 CAPSULE, DELAYED RELEASE ORAL at 08:39

## 2021-05-19 RX ADMIN — HYDROMORPHONE HYDROCHLORIDE 1 MG: 1 INJECTION, SOLUTION INTRAMUSCULAR; INTRAVENOUS; SUBCUTANEOUS at 22:21

## 2021-05-19 ASSESSMENT — PAIN SCALES - GENERAL
PAINLEVEL_OUTOF10: 9
PAINLEVEL_OUTOF10: 9
PAINLEVEL_OUTOF10: 8
PAINLEVEL_OUTOF10: 10
PAINLEVEL_OUTOF10: 0
PAINLEVEL_OUTOF10: 10
PAINLEVEL_OUTOF10: 6

## 2021-05-19 NOTE — PROGRESS NOTES
Vascular Surgery Progress Note    Pt is being seen in f/u today regarding RLE AKA wound debridement 5/14    Subjective  Pt s/e. Denies any issues overnight. Pain controlled did take 4 doses of dilaudid.  Wound vac with good seal     Current Medications:    sodium chloride      sodium chloride      dextrose        oxyCODONE **OR** oxyCODONE, HYDROmorphone, sodium chloride flush, sodium chloride, heparin flush, sodium chloride flush, sodium chloride, promethazine **OR** ondansetron, glucose, dextrose, glucagon (rDNA), dextrose    fentanNYL  100 mcg Intravenous See Admin Instructions    daptomycin (CUBICIN) IVPB  6 mg/kg (Ideal) Intravenous Q24H    lactobacillus  1 capsule Oral Daily    lidocaine PF  5 mL Intradermal Once    sodium chloride flush  5-40 mL Intravenous 2 times per day    heparin flush  3 mL Intravenous 2 times per day    diphenhydrAMINE  25 mg Intravenous Once    cloNIDine  0.1 mg Oral TID    cilostazol  50 mg Oral BID    amLODIPine  10 mg Oral Daily    hydrALAZINE  25 mg Oral TID    gabapentin  800 mg Oral 4x Daily    metoprolol tartrate  25 mg Oral BID    insulin lispro  15 Units Subcutaneous TID AC    oxyCODONE  10 mg Oral 2 times per day    chlorthalidone  25 mg Oral Daily    mirtazapine  7.5 mg Oral Nightly    DULoxetine  120 mg Oral Daily    sodium chloride flush  5-40 mL Intravenous 2 times per day    enoxaparin  40 mg Subcutaneous Daily    insulin lispro  0-18 Units Subcutaneous TID WC    insulin lispro  0-9 Units Subcutaneous Nightly    cefepime  2,000 mg Intravenous 30 Min Pre-Op        PHYSICAL EXAM:    /68   Pulse 67   Temp 98.5 °F (36.9 °C) (Oral)   Resp 18   Ht 6' 2\" (1.88 m)   Wt 245 lb (111.1 kg)   SpO2 95%   BMI 31.46 kg/m²     Intake/Output Summary (Last 24 hours) at 5/19/2021 0796  Last data filed at 5/19/2021 0433  Gross per 24 hour   Intake 1970 ml   Output 2100 ml   Net -130 ml          Gen: awake, alert and oriented x3, uncomfortable   CVS: RR  Resp: No increased work of breathing  Abd: Soft, non-tender, non-distended  R LE: wound vac in place with adequate seal, serosanguinous output, thigh soft no evidence of hematoma     LABS:    Lab Results   Component Value Date    WBC 9.6 05/17/2021    HGB 9.2 (L) 05/17/2021    HCT 28.8 (L) 05/17/2021     05/17/2021    PROTIME 11.3 05/12/2021    INR 1.0 05/12/2021    APTT 32.7 05/12/2021    K 3.8 05/17/2021    BUN 24 (H) 05/17/2021    CREATININE 1.8 (H) 05/17/2021       A/P  63 y.o. male s/p RLE AKA with wound debridement and revision of AKA on 5/14     - continue antibiotics per ID  - Diet  - Plan for discharge in a day or two and do not anticipate needing wound vac for home    Irma Larios MD     Pt seen and examined  Vac not working well, minimal drainage    Vac removed  Abdominal pad applied  Discharge planning - no wound vac at home    Sanjana Kaufman MD

## 2021-05-19 NOTE — PLAN OF CARE
Problem: Skin Integrity:  Goal: Will show no infection signs and symptoms  Description: Will show no infection signs and symptoms  Outcome: Met This Shift  Goal: Absence of new skin breakdown  Description: Absence of new skin breakdown  Outcome: Met This Shift     Problem: Falls - Risk of:  Goal: Will remain free from falls  Description: Will remain free from falls  Outcome: Met This Shift  Goal: Absence of physical injury  Description: Absence of physical injury  Outcome: Met This Shift     Problem: Increased nutrient needs (NI-5.1)  Goal: Food and/or Nutrient Delivery  Description: Individualized approach for food/nutrient provision.   5/19/2021 1217 by Alphonso Franklin, MS, RD, LD  Outcome: Met This Shift

## 2021-05-19 NOTE — PROGRESS NOTES
Education not indicated   Coordination of Nutrition Care:  Continue to monitor while inpatient    Goals:  Consume >75% meals/ONS       Nutrition Monitoring and Evaluation:   Food/Nutrient Intake Outcomes:  Diet Advancement/Tolerance, Food and Nutrient Intake, Supplement Intake  Physical Signs/Symptoms Outcomes:  Biochemical Data, GI Status, Fluid Status or Edema, Nutrition Focused Physical Findings, Skin, Weight     Discharge Planning:     Too soon to determine     Electronically signed by Cyndie Escalante MS, RD, LD on 5/19/21 at 12:18 PM EDT    Contact: [phone]

## 2021-05-20 ENCOUNTER — APPOINTMENT (OUTPATIENT)
Dept: GENERAL RADIOLOGY | Age: 66
DRG: 498 | End: 2021-05-20
Payer: COMMERCIAL

## 2021-05-20 VITALS
WEIGHT: 245 LBS | HEIGHT: 74 IN | RESPIRATION RATE: 18 BRPM | TEMPERATURE: 98.4 F | BODY MASS INDEX: 31.44 KG/M2 | DIASTOLIC BLOOD PRESSURE: 70 MMHG | SYSTOLIC BLOOD PRESSURE: 147 MMHG | HEART RATE: 72 BPM | OXYGEN SATURATION: 95 %

## 2021-05-20 LAB
ANION GAP SERPL CALCULATED.3IONS-SCNC: 8 MMOL/L (ref 7–16)
BUN BLDV-MCNC: 18 MG/DL (ref 6–23)
CALCIUM SERPL-MCNC: 8.4 MG/DL (ref 8.6–10.2)
CHLORIDE BLD-SCNC: 102 MMOL/L (ref 98–107)
CO2: 26 MMOL/L (ref 22–29)
CREAT SERPL-MCNC: 1.5 MG/DL (ref 0.7–1.2)
GFR AFRICAN AMERICAN: 57
GFR NON-AFRICAN AMERICAN: 57 ML/MIN/1.73
GLUCOSE BLD-MCNC: 155 MG/DL (ref 74–99)
HCT VFR BLD CALC: 29.4 % (ref 37–54)
HEMOGLOBIN: 9.3 G/DL (ref 12.5–16.5)
MCH RBC QN AUTO: 25.3 PG (ref 26–35)
MCHC RBC AUTO-ENTMCNC: 31.6 % (ref 32–34.5)
MCV RBC AUTO: 79.9 FL (ref 80–99.9)
METER GLUCOSE: 149 MG/DL (ref 74–99)
METER GLUCOSE: 204 MG/DL (ref 74–99)
METER GLUCOSE: 91 MG/DL (ref 74–99)
PDW BLD-RTO: 14.2 FL (ref 11.5–15)
PLATELET # BLD: 252 E9/L (ref 130–450)
PMV BLD AUTO: 10.6 FL (ref 7–12)
POTASSIUM SERPL-SCNC: 3.7 MMOL/L (ref 3.5–5)
RBC # BLD: 3.68 E12/L (ref 3.8–5.8)
SODIUM BLD-SCNC: 136 MMOL/L (ref 132–146)
WBC # BLD: 9.7 E9/L (ref 4.5–11.5)

## 2021-05-20 PROCEDURE — 6370000000 HC RX 637 (ALT 250 FOR IP): Performed by: STUDENT IN AN ORGANIZED HEALTH CARE EDUCATION/TRAINING PROGRAM

## 2021-05-20 PROCEDURE — 99024 POSTOP FOLLOW-UP VISIT: CPT | Performed by: SURGERY

## 2021-05-20 PROCEDURE — 6360000002 HC RX W HCPCS: Performed by: STUDENT IN AN ORGANIZED HEALTH CARE EDUCATION/TRAINING PROGRAM

## 2021-05-20 PROCEDURE — 2580000003 HC RX 258: Performed by: STUDENT IN AN ORGANIZED HEALTH CARE EDUCATION/TRAINING PROGRAM

## 2021-05-20 PROCEDURE — 71045 X-RAY EXAM CHEST 1 VIEW: CPT

## 2021-05-20 PROCEDURE — 36415 COLL VENOUS BLD VENIPUNCTURE: CPT

## 2021-05-20 PROCEDURE — 80048 BASIC METABOLIC PNL TOTAL CA: CPT

## 2021-05-20 PROCEDURE — 85027 COMPLETE CBC AUTOMATED: CPT

## 2021-05-20 PROCEDURE — 82962 GLUCOSE BLOOD TEST: CPT

## 2021-05-20 RX ORDER — OXYCODONE HYDROCHLORIDE 5 MG/1
5 TABLET ORAL EVERY 6 HOURS PRN
Qty: 28 TABLET | Refills: 0 | Status: SHIPPED | OUTPATIENT
Start: 2021-05-20 | End: 2021-05-27

## 2021-05-20 RX ADMIN — INSULIN LISPRO 15 UNITS: 100 INJECTION, SOLUTION INTRAVENOUS; SUBCUTANEOUS at 08:34

## 2021-05-20 RX ADMIN — INSULIN LISPRO 3 UNITS: 100 INJECTION, SOLUTION INTRAVENOUS; SUBCUTANEOUS at 12:01

## 2021-05-20 RX ADMIN — CHLORTHALIDONE 25 MG: 25 TABLET ORAL at 08:33

## 2021-05-20 RX ADMIN — CLONIDINE HYDROCHLORIDE 0.1 MG: 0.1 TABLET ORAL at 08:32

## 2021-05-20 RX ADMIN — HYDRALAZINE HYDROCHLORIDE 25 MG: 25 TABLET, FILM COATED ORAL at 08:33

## 2021-05-20 RX ADMIN — Medication 1 CAPSULE: at 08:33

## 2021-05-20 RX ADMIN — CLONIDINE HYDROCHLORIDE 0.1 MG: 0.1 TABLET ORAL at 13:43

## 2021-05-20 RX ADMIN — DAPTOMYCIN 500 MG: 500 INJECTION, POWDER, LYOPHILIZED, FOR SOLUTION INTRAVENOUS at 17:39

## 2021-05-20 RX ADMIN — HYDRALAZINE HYDROCHLORIDE 25 MG: 25 TABLET, FILM COATED ORAL at 13:46

## 2021-05-20 RX ADMIN — GABAPENTIN 800 MG: 400 CAPSULE ORAL at 17:06

## 2021-05-20 RX ADMIN — CILOSTAZOL 50 MG: 50 TABLET ORAL at 08:32

## 2021-05-20 RX ADMIN — Medication 10 ML: at 08:34

## 2021-05-20 RX ADMIN — OXYCODONE HYDROCHLORIDE 10 MG: 10 TABLET, FILM COATED, EXTENDED RELEASE ORAL at 08:33

## 2021-05-20 RX ADMIN — DULOXETINE HYDROCHLORIDE 120 MG: 60 CAPSULE, DELAYED RELEASE ORAL at 08:33

## 2021-05-20 RX ADMIN — OXYCODONE HYDROCHLORIDE 10 MG: 10 TABLET ORAL at 17:39

## 2021-05-20 RX ADMIN — METOPROLOL TARTRATE 25 MG: 25 TABLET, FILM COATED ORAL at 08:33

## 2021-05-20 RX ADMIN — INSULIN LISPRO 15 UNITS: 100 INJECTION, SOLUTION INTRAVENOUS; SUBCUTANEOUS at 17:07

## 2021-05-20 RX ADMIN — INSULIN LISPRO 15 UNITS: 100 INJECTION, SOLUTION INTRAVENOUS; SUBCUTANEOUS at 12:00

## 2021-05-20 RX ADMIN — GABAPENTIN 800 MG: 400 CAPSULE ORAL at 13:42

## 2021-05-20 RX ADMIN — AMLODIPINE BESYLATE 10 MG: 10 TABLET ORAL at 08:33

## 2021-05-20 RX ADMIN — SODIUM CHLORIDE, PRESERVATIVE FREE 300 UNITS: 5 INJECTION INTRAVENOUS at 08:34

## 2021-05-20 RX ADMIN — OXYCODONE HYDROCHLORIDE 10 MG: 10 TABLET ORAL at 13:42

## 2021-05-20 RX ADMIN — INSULIN LISPRO 6 UNITS: 100 INJECTION, SOLUTION INTRAVENOUS; SUBCUTANEOUS at 08:41

## 2021-05-20 RX ADMIN — GABAPENTIN 800 MG: 400 CAPSULE ORAL at 08:33

## 2021-05-20 RX ADMIN — Medication 10 ML: at 10:17

## 2021-05-20 ASSESSMENT — PAIN SCALES - GENERAL
PAINLEVEL_OUTOF10: 8
PAINLEVEL_OUTOF10: 8
PAINLEVEL_OUTOF10: 9
PAINLEVEL_OUTOF10: 7
PAINLEVEL_OUTOF10: 6

## 2021-05-20 NOTE — PLAN OF CARE
Problem: Skin Integrity:  Goal: Will show no infection signs and symptoms  Description: Will show no infection signs and symptoms  5/20/2021 0154 by Orion Stringer  Outcome: Met This Shift  5/19/2021 1304 by Dionisio Minor RN  Outcome: Met This Shift  Goal: Absence of new skin breakdown  Description: Absence of new skin breakdown  5/20/2021 0154 by Orion Stringer  Outcome: Met This Shift  5/19/2021 1304 by Dionisio Minor RN  Outcome: Met This Shift     Problem: Falls - Risk of:  Goal: Will remain free from falls  Description: Will remain free from falls  5/20/2021 0154 by Orion Stringer  Outcome: Met This Shift  5/19/2021 1304 by Dionisio Minor RN  Outcome: Met This Shift  Goal: Absence of physical injury  Description: Absence of physical injury  5/20/2021 0154 by Orion Stringer  Outcome: Met This Shift  5/19/2021 1304 by Dionisio Minor RN  Outcome: Met This Shift     Problem: Increased nutrient needs (NI-5.1)  Goal: Food and/or Nutrient Delivery  Description: Individualized approach for food/nutrient provision.   5/19/2021 1217 by Ronak Matthews, MS, RD, LD  Outcome: Met This Shift

## 2021-05-20 NOTE — CARE COORDINATION
Social Work Discharge/Planning:    Discharge order noted. NIKKI met with patient for follow up transition of care discussion. Patient reports plan remains to return home with [facility]. Patient is able to call for a ride at time of discharge. 12 NIKKI spoke with liaison [address] [place] [address] with [facility] who reports patient will need his 6pm dose in order for [facility] to see patient tomorrow. Patient will need KAROLINA orders. [address] faxed script over to [facility]. [address] notified liaison [address] [place] [address] with [facility] of patient's DC order.     Lillian Hurtado LU  [phone]

## 2021-05-20 NOTE — PROGRESS NOTES
Vascular Surgery Progress Note    Pt is being seen in f/u today regarding RLE AKA wound debridement 5/14    Subjective  Pt s/e. Denies any issues overnight. Dilaudid d/c today, discussed with patient he understands and is agreeable.       Current Medications:    sodium chloride      sodium chloride      dextrose        oxyCODONE **OR** oxyCODONE, sodium chloride flush, sodium chloride, heparin flush, sodium chloride flush, sodium chloride, promethazine **OR** ondansetron, glucose, dextrose, glucagon (rDNA), dextrose    fentanNYL  100 mcg Intravenous See Admin Instructions    daptomycin (CUBICIN) IVPB  6 mg/kg (Ideal) Intravenous Q24H    lactobacillus  1 capsule Oral Daily    lidocaine PF  5 mL Intradermal Once    sodium chloride flush  5-40 mL Intravenous 2 times per day    heparin flush  3 mL Intravenous 2 times per day    diphenhydrAMINE  25 mg Intravenous Once    cloNIDine  0.1 mg Oral TID    cilostazol  50 mg Oral BID    amLODIPine  10 mg Oral Daily    hydrALAZINE  25 mg Oral TID    gabapentin  800 mg Oral 4x Daily    metoprolol tartrate  25 mg Oral BID    insulin lispro  15 Units Subcutaneous TID AC    oxyCODONE  10 mg Oral 2 times per day    chlorthalidone  25 mg Oral Daily    mirtazapine  7.5 mg Oral Nightly    DULoxetine  120 mg Oral Daily    sodium chloride flush  5-40 mL Intravenous 2 times per day    enoxaparin  40 mg Subcutaneous Daily    insulin lispro  0-18 Units Subcutaneous TID WC    insulin lispro  0-9 Units Subcutaneous Nightly        PHYSICAL EXAM:    BP (!) 151/73   Pulse 74   Temp 98.4 °F (36.9 °C) (Oral)   Resp 18   Ht 6' 2\" (1.88 m)   Wt 245 lb (111.1 kg)   SpO2 95%   BMI 31.46 kg/m²     Intake/Output Summary (Last 24 hours) at 5/20/2021 0655  Last data filed at 5/19/2021 2235  Gross per 24 hour   Intake 700 ml   Output 1800 ml   Net -1100 ml          Gen: awake, alert and oriented x3, uncomfortable   CVS: RR  Resp: No increased work of breathing  Abd: Soft, non-tender, non-distended  R LE: incision c/d/i minimal strikethrough on the dressing, thigh soft no evidence of hematoma     LABS:    Lab Results   Component Value Date    WBC 9.7 05/20/2021    HGB 9.3 (L) 05/20/2021    HCT 29.4 (L) 05/20/2021     05/20/2021    PROTIME 11.3 05/12/2021    INR 1.0 05/12/2021    APTT 32.7 05/12/2021    K 3.8 05/17/2021    BUN 24 (H) 05/17/2021    CREATININE 1.8 (H) 05/17/2021       A/P  63 y.o. male s/p RLE AKA with wound debridement and revision of AKA on 5/14     - continue antibiotics per ID- will need either home abx of infusion center defer to ID, appreciate SW help with this  - Diet  - Pain control  - Plan for discharge today if able, patient understands and is agreeable     Eveline López MD       Patient was seen and examined. Agree with above. Overall the wound looks good. Incision is clean dry and intact currently. Sutures are in place. He has no fluctuance. He is minimally tender on physical examination. He otherwise is doing well from our standpoint he can be discharged.     Dionisio Ivory

## 2021-05-20 NOTE — ANESTHESIA POSTPROCEDURE EVALUATION
Department of Anesthesiology  Postprocedure Note    Patient: Patrick Banks  MRN: [id number]  [place]: 1955  Date of evaluation: 5/19/2021  Time:  8:47 PM     Procedure Summary     Date: 05/18/21 Room / Location: [address] [place] [address] / [facility]    Anesthesia Start: 1604 Anesthesia Stop: 1464    Procedure: [address] , WITH WOUND VAC APPLICATION (Right Thigh) Diagnosis: (.)    Surgeons: Denis Chatterjee MD Responsible Provider: Kevon Corrigan DO    Anesthesia Type: MAC, general ASA Status: 4          Anesthesia Type: MAC, general    Blaine Phase I: Blaine Score: 9    Blaine Phase II:      Last vitals: Reviewed and per EMR flowsheets.        Anesthesia Post Evaluation    Patient location during evaluation: PACU  Patient participation: complete - patient participated  Level of consciousness: awake and alert  Pain score: 1  Airway patency: patent  Nausea & Vomiting: no nausea and no vomiting  Complications: no  Cardiovascular status: hemodynamically stable  Respiratory status: acceptable  Hydration status: euvolemic

## 2021-05-20 NOTE — PROGRESS NOTES
[address] [place] [address] [facility]  Progress Note      C/C: Right hip stump wound infection       SUBJECTIVE:  Patient is tolerating medications. No reported adverse drug reactions. No nausea, vomiting, diarrhea. Reports pain in the wound   Has been edge of bed and standing  No sob or cough. No fever charted but sweating this am  Review of systems:  As stated above in the chief complaint, otherwise negative.     Medications:  Scheduled Meds:   fentanNYL  100 mcg Intravenous See Admin Instructions    daptomycin (CUBICIN) IVPB  6 mg/kg (Ideal) Intravenous Q24H    lactobacillus  1 capsule Oral Daily    lidocaine PF  5 mL Intradermal Once    sodium chloride flush  5-40 mL Intravenous 2 times per day    heparin flush  3 mL Intravenous 2 times per day    diphenhydrAMINE  25 mg Intravenous Once    cloNIDine  0.1 mg Oral TID    cilostazol  50 mg Oral BID    amLODIPine  10 mg Oral Daily    hydrALAZINE  25 mg Oral TID    gabapentin  800 mg Oral 4x Daily    metoprolol tartrate  25 mg Oral BID    insulin lispro  15 Units Subcutaneous TID AC    oxyCODONE  10 mg Oral 2 times per day    chlorthalidone  25 mg Oral Daily    mirtazapine  7.5 mg Oral Nightly    DULoxetine  120 mg Oral Daily    sodium chloride flush  5-40 mL Intravenous 2 times per day    enoxaparin  40 mg Subcutaneous Daily    insulin lispro  0-18 Units Subcutaneous TID WC    insulin lispro  0-9 Units Subcutaneous Nightly     Continuous Infusions:   sodium chloride      sodium chloride      dextrose       PRN Meds:oxyCODONE **OR** oxyCODONE, sodium chloride flush, sodium chloride, heparin flush, sodium chloride flush, sodium chloride, promethazine **OR** ondansetron, glucose, dextrose, glucagon (rDNA), dextrose    OBJECTIVE:  /72   Pulse 72   Temp 98.4 °F (36.9 °C) (Temporal)   Resp (!) 72   Ht 6' 2\" (1.88 m)   Wt 245 lb (111.1 kg)   SpO2 95%   BMI 31.46 kg/m²   Temp  Av.4 °F (36.9 °C)  Min: 98.4 °F (36.9 °C)  Max: 98.4 °F (36.9 04/21/2020 Moderate growth  Final   04/21/2020 Moderate growth  Final     Urine Culture, Routine   Date Value Ref Range Status   04/19/2020 Growth not present  Final   03/20/2020 <10,000 CFU/mL  Gram negative rods   (A)  Final   03/20/2020 <10,000 CFU/ml  Final   03/20/2020 <10,000 CFU/ml  Final     No results found for: [address]       Wound cx 5/12 MRSA and corynebacterium  Baseline ESR 65, CRP 2.9      Assessment:  MRSA infection right hip stump  associated with deep structures including  Osteomyelitis  S/p I&D and closure      Plan:    Cont daptomycin 6 mg /kg IV q 24 hrs 6 weeks then suppression  Med rec complete  Lactobacillus  Wound care   IS  Check chest xray  Check cultures    Electronically signed by SHONDA Gandara CNP on 5/20/2021 at 8:39 AM   Pt seen and examined. Above discussed agree with advanced practice nurse. Labs, cultures, and radiographs reviewed. Face to Face encounter occurred. Changes made as necessary.      Tomi Howard MD

## 2021-05-21 ENCOUNTER — TELEPHONE (OUTPATIENT)
Dept: PRIMARY CARE CLINIC | Age: 66
End: 2021-05-21

## 2021-05-21 NOTE — TELEPHONE ENCOUNTER
[facility] Initial Follow Up Call    Outreach made within 2 business days of discharge: Yes    Patient: Viraj Bravo Patient : 1955   MRN: [id number]  Reason for Admission: There are no discharge diagnoses documented for the most recent discharge. Discharge Date: 21       Spoke with: Left message with Dawson Wilder.     Discharge department/facility: home         Scheduled appointment with PCP within 7-14 days    Follow Up  Future Appointments   Date Time Provider Jason Fam   2021  1:30 PM DO SANTANA Dwyer [facility]   2021 11:20 AM Pratheek Arlana Siemens, MD [facility]

## 2021-05-25 NOTE — DISCHARGE SUMMARY
Protocol)     Visiting nurses are to write the Primary Care Physician and their own call back number on all laboratory requisition forms. Abnormal lab values are called to the physician by the nurse and NOT by the laboratory. Fax all labs to the office in a timely manner, during office hours. All faxes should include nurses name and call back number. Vascular Access Devices or VADs (TLC, PICC, Midline, etc) will be replaced as necessary. Draw all blood work from VADs, except for drug levels. If unable to access a VAD, insert a peripheral catheter temporarily. Contact the Primary Care Physician or [facility] office for surgical referral.  Use tPA (Fabio Forrest) as per agency protocol to restore patency of VAD. Remove VAD upon completion of IV antibiotics, unless otherwise specified by the ordering physician. If VAD cannot be removed, schedule appointment at office for removal.  Notify ordering physician or office if patient requires admission to the hospital with reason for admission. Discontinue all blood work upon completion of IV antibiotics, unless otherwise specified by ordering physician. Notify ordering physician if the patient does not receive the scheduled antibiotic for 24 hours or more. The Pharmacy and [address] [place] [address] may adjust the timing of the infusion and blood work to accommodate the patients home care conditions. When PICC or VAD is to be removed, documentation of length of inserted PICC.  PICC or VAD length is to correlate with inserted length and sent to physician at the time of removal.  Give the patient a list of antibiotics being administered with:  Drug name  Route  Frequency  Start/Stop date      ROUTINE LABS TO BE DRAWN/ORDERED:  Twice weekly (preferably every Monday & Thursday):  BUN Creatinine and liver panel  Complete Blood Count with differential (CBC with dif)  Once weekly:  C-Reactive Protein (not high sensitivity CRP)  Erythrocyte/Westergren Sedimentation Rate (WSR or ESR)  Total CPK for patients on Daptomycin (Cubicin®). Obtain CPK more often if the patient is experiencing muscle weakness or myalgias. Clinical Pharmacist is to adjust Vancomycin and Aminoglycosides unless otherwise indicated. Draw Vancomycin trough 30 minutes before the third dose  After starting drug, or   After the dosing or interval is changed. If the trough level is between 5 and 20 continue dose as ordered. Draw troughs twice weekly thereafter until troughs are stable (i.e. until trough is between 5 and 20 mcg/ml for two consecutive laboratory values). Once stable check troughs once weekly or every third dose. Please do not call physician unless the trough is < 5 or >20. If the trough is <20 continue dosing as ordered. If the trough is >20 call the office for further orders. Do not hold the dose while waiting for the trough result. Amingoglycosides (e.g. Gentamicin, Tobramycin and Amikacin) peaks and troughs should be drawn twice weekly (preferably on Mondays and Thursdays) or every third dose. Aminoglycoside peaks are not to be drawn if patient on Once-Daily Dosing (ODD). Call physician or office if the trough is:  >1 for gentamicin,   >2 for tobramycin, or   >5 for amikacin  When clinically indicated obtain:  Urine culture. If the patient has a fever with purulent drainage from Servin or suprapubic catheter, or foul smelling urine. Do not irrigate a clogged Servin catheter. Replace it. Blood cultures and Wound Gram stain with culture & sensitivity. If the patient has a fever or increasing drainage or foul odor from a wound. Notify the treating physician in a timely manner  Stool specimen. If diarrhea occurs while on antibiotics, send stools for C. difficile and WBCs. When a drug is discontinued due to a low white blood cell count (WBCs) draw two consecutive CBC with differential and CMP.          ALLERGIC OR ADVERSE REACTIONS TO MEDICATIONS  Mild reaction: (itching, with or without should be with you for the next 24 hours. Please follow the instructions checked below:    DIET INSTRUCTIONS:  [x]Start with light diet and progress to your normal diet as you feel like eating. If you experience nausea or repeated episodes of vomiting which persist beyond 12-24 hours, notify your doctor. []Other     WOUND/DRESSING INSTRUCTIONS:  Always ensure you and your care giver clean hands before and after caring for the wound. []May shower      []May bathe      [x]Other - apply dry abdominal pad dressing with paper tape to right leg wound change daily and as needed     MEDICATION INSTRUCTIONS:    [x]Prescriptions sent with you. Use as directed. When taking pain medications, you may experience dizziness or drowsiness. Do not drink alcohol or drive when taking these medications. [x]You may take a non-prescription headache remedy, preferably one that does not contain aspirin. Other Instructions:      FOLLOW-UP CARE:  [x]Call the wound care center for follow-up appointment in 2 week    Call physician if they or any other problems occur:  Fever over 101°    Redness, swelling, hardness or warmth at the operative site  Unrelieved nausea    Foul smelling or cloudy drainage at the operative site   Unrelieved pain    Blood soaked dressing. (Some oozing may be normal)                          Follow up:    Majo Motta MD  [address] [place] [address] [address]  [phone]    Schedule an appointment as soon as possible for a visit in 2 weeks      Fili Butts MD  [facility] [address]  [phone]    Schedule an appointment as soon as possible for a visit in 2 weeks  For routine follow-up       Signed:  Dian Mesa MD  5/25/2021  4:36 AM    Fili Butts MD

## 2021-05-26 ENCOUNTER — HOSPITAL ENCOUNTER (OUTPATIENT)
Dept: WOUND CARE | Age: 66
Discharge: HOME | End: 2021-05-26
Payer: COMMERCIAL

## 2021-05-27 ENCOUNTER — OFFICE VISIT (OUTPATIENT)
Dept: PRIMARY CARE CLINIC | Age: 66
End: 2021-05-27
Payer: COMMERCIAL

## 2021-05-27 VITALS
BODY MASS INDEX: 31.44 KG/M2 | TEMPERATURE: 97.8 F | HEIGHT: 74 IN | WEIGHT: 245 LBS | DIASTOLIC BLOOD PRESSURE: 82 MMHG | SYSTOLIC BLOOD PRESSURE: 126 MMHG | OXYGEN SATURATION: 98 % | HEART RATE: 81 BPM | RESPIRATION RATE: 16 BRPM

## 2021-05-27 DIAGNOSIS — T87.9 AKA STUMP COMPLICATION (HCC): ICD-10-CM

## 2021-05-27 DIAGNOSIS — E11.65 UNCONTROLLED TYPE 2 DIABETES MELLITUS WITH HYPERGLYCEMIA (HCC): ICD-10-CM

## 2021-05-27 DIAGNOSIS — T87.9 AKA STUMP COMPLICATION (HCC): Primary | ICD-10-CM

## 2021-05-27 DIAGNOSIS — G89.4 CHRONIC PAIN SYNDROME: ICD-10-CM

## 2021-05-27 DIAGNOSIS — R70.0 ELEVATED SEDIMENTATION RATE: ICD-10-CM

## 2021-05-27 DIAGNOSIS — T81.49XA POSTOPERATIVE WOUND INFECTION: ICD-10-CM

## 2021-05-27 PROBLEM — G47.33 OBSTRUCTIVE SLEEP APNEA (ADULT) (PEDIATRIC): Status: ACTIVE | Noted: 2020-01-16

## 2021-05-27 PROBLEM — M62.81 MUSCLE WEAKNESS (GENERALIZED): Status: ACTIVE | Noted: 2020-01-16

## 2021-05-27 PROBLEM — Z89.611 ACQUIRED ABSENCE OF RIGHT LEG ABOVE KNEE (HCC): Status: ACTIVE | Noted: 2020-01-16

## 2021-05-27 PROBLEM — F32.9 MAJOR DEPRESSIVE DISORDER, SINGLE EPISODE, UNSPECIFIED: Status: ACTIVE | Noted: 2020-01-16

## 2021-05-27 LAB
ALBUMIN SERPL-MCNC: 3.5 G/DL (ref 3.5–5.2)
ALP BLD-CCNC: 99 U/L (ref 40–129)
ALT SERPL-CCNC: 7 U/L (ref 0–40)
ANION GAP SERPL CALCULATED.3IONS-SCNC: 10 MMOL/L (ref 7–16)
AST SERPL-CCNC: 13 U/L (ref 0–39)
BASOPHILS ABSOLUTE: 0.09 E9/L (ref 0–0.2)
BASOPHILS RELATIVE PERCENT: 0.8 % (ref 0–2)
BILIRUB SERPL-MCNC: 0.2 MG/DL (ref 0–1.2)
BILIRUBIN DIRECT: <0.2 MG/DL (ref 0–0.3)
BILIRUBIN, INDIRECT: NORMAL MG/DL (ref 0–1)
BUN BLDV-MCNC: 20 MG/DL (ref 6–23)
C-REACTIVE PROTEIN: 4.1 MG/DL (ref 0–0.4)
CALCIUM SERPL-MCNC: 9.5 MG/DL (ref 8.6–10.2)
CHLORIDE BLD-SCNC: 99 MMOL/L (ref 98–107)
CO2: 27 MMOL/L (ref 22–29)
CREAT SERPL-MCNC: 1.7 MG/DL (ref 0.7–1.2)
EOSINOPHILS ABSOLUTE: 0.25 E9/L (ref 0.05–0.5)
EOSINOPHILS RELATIVE PERCENT: 2.1 % (ref 0–6)
GFR AFRICAN AMERICAN: 49
GFR NON-AFRICAN AMERICAN: 49 ML/MIN/1.73
GLUCOSE BLD-MCNC: 241 MG/DL (ref 74–99)
HCT VFR BLD CALC: 34.4 % (ref 37–54)
HEMOGLOBIN: 10.6 G/DL (ref 12.5–16.5)
IMMATURE GRANULOCYTES #: 0.05 E9/L
IMMATURE GRANULOCYTES %: 0.4 % (ref 0–5)
LYMPHOCYTES ABSOLUTE: 2.78 E9/L (ref 1.5–4)
LYMPHOCYTES RELATIVE PERCENT: 23.6 % (ref 20–42)
MCH RBC QN AUTO: 25.1 PG (ref 26–35)
MCHC RBC AUTO-ENTMCNC: 30.8 % (ref 32–34.5)
MCV RBC AUTO: 81.5 FL (ref 80–99.9)
MONOCYTES ABSOLUTE: 0.71 E9/L (ref 0.1–0.95)
MONOCYTES RELATIVE PERCENT: 6 % (ref 2–12)
NEUTROPHILS ABSOLUTE: 7.92 E9/L (ref 1.8–7.3)
NEUTROPHILS RELATIVE PERCENT: 67.1 % (ref 43–80)
PDW BLD-RTO: 14.3 FL (ref 11.5–15)
PLATELET # BLD: 338 E9/L (ref 130–450)
PMV BLD AUTO: 11.1 FL (ref 7–12)
POTASSIUM SERPL-SCNC: 4.8 MMOL/L (ref 3.5–5)
RBC # BLD: 4.22 E12/L (ref 3.8–5.8)
SODIUM BLD-SCNC: 136 MMOL/L (ref 132–146)
TOTAL CK: 204 U/L (ref 20–200)
TOTAL PROTEIN: 8.2 G/DL (ref 6.4–8.3)
WBC # BLD: 11.8 E9/L (ref 4.5–11.5)

## 2021-05-27 PROCEDURE — 99496 TRANSJ CARE MGMT HIGH F2F 7D: CPT | Performed by: FAMILY MEDICINE

## 2021-05-27 PROCEDURE — 1111F DSCHRG MED/CURRENT MED MERGE: CPT | Performed by: FAMILY MEDICINE

## 2021-05-27 RX ORDER — OXYCODONE AND ACETAMINOPHEN 7.5; 325 MG/1; MG/1
1 TABLET ORAL EVERY 6 HOURS PRN
Qty: 120 TABLET | Refills: 0 | Status: SHIPPED
Start: 2021-05-27 | End: 2021-06-24 | Stop reason: SDUPTHER

## 2021-05-27 RX ORDER — OXYCODONE HCL 10 MG/1
10 TABLET, FILM COATED, EXTENDED RELEASE ORAL EVERY 12 HOURS
COMMUNITY
End: 2021-06-02

## 2021-05-27 RX ORDER — INSULIN GLARGINE 100 [IU]/ML
25 INJECTION, SOLUTION SUBCUTANEOUS NIGHTLY
Qty: 5 PEN | Refills: 3 | Status: SHIPPED
Start: 2021-05-27 | End: 2021-10-27 | Stop reason: SDUPTHER

## 2021-05-27 RX ORDER — OXYCODONE HYDROCHLORIDE 15 MG/1
1 TABLET, FILM COATED, EXTENDED RELEASE ORAL EVERY 12 HOURS
Qty: 60 TABLET | Refills: 0 | Status: SHIPPED
Start: 2021-05-27 | End: 2021-06-24 | Stop reason: SDUPTHER

## 2021-05-27 ASSESSMENT — ENCOUNTER SYMPTOMS
BLOOD IN STOOL: 0
PHOTOPHOBIA: 0
DIARRHEA: 0
CONSTIPATION: 1
APNEA: 1
SHORTNESS OF BREATH: 0
BACK PAIN: 0

## 2021-05-28 LAB — SEDIMENTATION RATE, ERYTHROCYTE: 103 MM/HR (ref 0–15)

## 2021-06-02 ENCOUNTER — HOSPITAL ENCOUNTER (OUTPATIENT)
Dept: WOUND CARE | Age: 66
Discharge: HOME | End: 2021-06-02
Payer: COMMERCIAL

## 2021-06-02 VITALS
HEART RATE: 82 BPM | WEIGHT: 245 LBS | BODY MASS INDEX: 31.44 KG/M2 | HEIGHT: 74 IN | DIASTOLIC BLOOD PRESSURE: 72 MMHG | RESPIRATION RATE: 18 BRPM | TEMPERATURE: 98.4 F | SYSTOLIC BLOOD PRESSURE: 138 MMHG

## 2021-06-02 DIAGNOSIS — L97.112: ICD-10-CM

## 2021-06-02 DIAGNOSIS — T81.32XS: Primary | ICD-10-CM

## 2021-06-02 PROCEDURE — 99213 OFFICE O/P EST LOW 20 MIN: CPT

## 2021-06-02 PROCEDURE — 6370000000 HC RX 637 (ALT 250 FOR IP): Performed by: SURGERY

## 2021-06-02 PROCEDURE — 99024 POSTOP FOLLOW-UP VISIT: CPT | Performed by: SURGERY

## 2021-06-02 RX ORDER — BACITRACIN, NEOMYCIN, POLYMYXIN B 400; 3.5; 5 [USP'U]/G; MG/G; [USP'U]/G
OINTMENT TOPICAL ONCE
Status: CANCELLED | OUTPATIENT
Start: 2021-06-02 | End: 2021-06-02

## 2021-06-02 RX ORDER — LIDOCAINE 50 MG/G
OINTMENT TOPICAL ONCE
Status: CANCELLED | OUTPATIENT
Start: 2021-06-02 | End: 2021-06-02

## 2021-06-02 RX ORDER — LIDOCAINE HYDROCHLORIDE 40 MG/ML
SOLUTION TOPICAL ONCE
Status: CANCELLED | OUTPATIENT
Start: 2021-06-02 | End: 2021-06-02

## 2021-06-02 RX ORDER — BACITRACIN ZINC AND POLYMYXIN B SULFATE 500; 1000 [USP'U]/G; [USP'U]/G
OINTMENT TOPICAL ONCE
Status: CANCELLED | OUTPATIENT
Start: 2021-06-02 | End: 2021-06-02

## 2021-06-02 RX ORDER — LIDOCAINE 40 MG/G
CREAM TOPICAL ONCE
Status: CANCELLED | OUTPATIENT
Start: 2021-06-02 | End: 2021-06-02

## 2021-06-02 RX ORDER — LIDOCAINE HYDROCHLORIDE 40 MG/ML
SOLUTION TOPICAL ONCE
Status: COMPLETED | OUTPATIENT
Start: 2021-06-02 | End: 2021-06-02

## 2021-06-02 RX ORDER — LIDOCAINE HYDROCHLORIDE 20 MG/ML
JELLY TOPICAL ONCE
Status: CANCELLED | OUTPATIENT
Start: 2021-06-02 | End: 2021-06-02

## 2021-06-02 RX ORDER — GENTAMICIN SULFATE 1 MG/G
OINTMENT TOPICAL ONCE
Status: CANCELLED | OUTPATIENT
Start: 2021-06-02 | End: 2021-06-02

## 2021-06-02 RX ORDER — CLOBETASOL PROPIONATE 0.5 MG/G
OINTMENT TOPICAL ONCE
Status: CANCELLED | OUTPATIENT
Start: 2021-06-02 | End: 2021-06-02

## 2021-06-02 RX ORDER — GINSENG 100 MG
CAPSULE ORAL ONCE
Status: CANCELLED | OUTPATIENT
Start: 2021-06-02 | End: 2021-06-02

## 2021-06-02 RX ORDER — BETAMETHASONE DIPROPIONATE 0.05 %
OINTMENT (GRAM) TOPICAL ONCE
Status: CANCELLED | OUTPATIENT
Start: 2021-06-02 | End: 2021-06-02

## 2021-06-02 RX ADMIN — LIDOCAINE HYDROCHLORIDE 5 ML: 40 SOLUTION TOPICAL at 11:29

## 2021-06-02 ASSESSMENT — PAIN DESCRIPTION - ONSET: ONSET: ON-GOING

## 2021-06-02 ASSESSMENT — PAIN DESCRIPTION - DESCRIPTORS: DESCRIPTORS: SHARP;JABBING

## 2021-06-02 ASSESSMENT — PAIN DESCRIPTION - PAIN TYPE: TYPE: SURGICAL PAIN

## 2021-06-02 ASSESSMENT — PAIN DESCRIPTION - LOCATION: LOCATION: HIP

## 2021-06-02 ASSESSMENT — PAIN DESCRIPTION - PROGRESSION: CLINICAL_PROGRESSION: NOT CHANGED

## 2021-06-02 ASSESSMENT — PAIN DESCRIPTION - FREQUENCY: FREQUENCY: CONTINUOUS

## 2021-06-02 ASSESSMENT — PAIN DESCRIPTION - ORIENTATION: ORIENTATION: RIGHT

## 2021-06-02 ASSESSMENT — PAIN - FUNCTIONAL ASSESSMENT: PAIN_FUNCTIONAL_ASSESSMENT: PREVENTS OR INTERFERES SOME ACTIVE ACTIVITIES AND ADLS

## 2021-06-02 ASSESSMENT — PAIN SCALES - GENERAL: PAINLEVEL_OUTOF10: 7

## 2021-06-02 NOTE — PROGRESS NOTES
up  · aquacell ag packing  · Discussed potential need for further surgical exploration in future - pt would like to avoid hospital and OR if possible  9/2/20  · Wound much improved - depth less  · Start doxycyline for staph culture  9/9/20  · Depth 5 cm again, fluid seems less  9/23/20  · Tunneling now 8 cm, concern that hittig bone  · Discussed with patient continuing current reigmen unlikely to result in healing  · Would benefit from further surgical debridement, likely revision of amputation site and possible wound vac  · Patient would like to consider options and will let me know next week  9/30/20  · Significant tunneling still  · Will proceed with or in 2 weeks per pt request  10/21/20  · OR 10/14/20  · dakins moistened kerlex  · Discussed with Dr. Glenn Le re pain control issues  · Doxycycline 10 days script given  10/28/20  · Pain better controlled  · + Granulation tissue  · Bone less exposed  11/4/20  · Wound health  · Bone still exposed  11/18/20  · Wound continues to be improved  · Less bone exposed  11/25/20  · Bone exposure still an issue  · Will take for surgery - try dermagraft  · If not successful will likely need revision in future  12/9/20  · Bone exposure, wound overall improvd  · Patient did not follow though with testing for surgery  12/17/20  · dermagraft application  [birth year]  · Wound improved, bone still exposed  1/6/21  · Wound improved, bone still exposed  1/13/21  · Bone almost completely covered  1/27/21  · Bone covered, wound improved  2/10/21  · wound improved,depth less  2/24/21  · Wound stable - 3.5 cm depth  3/10/21  · Wound stable 4 cm depth  3/24/21  · Wound stable, same depth not improving - discussed with patient concerning and may need further debridement  3/31/21  · Depth improved 3.5 cm  4/14/21  · Depth improved 3 cm   4/21/21  · Depth improved 2.8 cm  4/28/21  · Depth stable  5/12/21  · Increased pain  · Suspect wound has closed down to small and needs opened up as fluid is being trapped  · Will admit through er  · Or planned for tomorrow  5/12-20/21  · OR debridement, resection of more femur 5/14/21, Debridement and closure 5/18/21 6/2/21  · Most of wound is closed  · Small area laterally that is open and has ~ 2 cm depth - pack with aquacell    Wound/Ulcer Pain Timing/Severity:  severe  Quality of pain: aching, throbbing, shooting   Severity:  8 / 10   Modifying Factors: Pain worsens with dressing changes, pressure, debridement  Associated Signs/Symptoms: drainage and pain    Ulcer Identification:  Ulcer Type: arterial, diabetic, pressure and non-healing surgical  Contributing Factors: edema, diabetes, poor glucose control, chronic pressure, decreased mobility, shear force, obesity and arterial insufficiency    Diabetic/Pressure/Non Pressure Ulcers only:  Ulcer: Diabetic ulcer, fat layer exposed    Wound: Wound dehiscence        PAST MEDICAL HISTORY      Diagnosis Date    Acute kidney injury ([address] 5/18/2020    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 ([address] 5/22/2018    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 ([address] 5/21/2018    Cellulitis, scrotum 3/20/2020    Critical lower limb ischemia 3/20/2020    Diabetes mellitus ([address]     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 2 diabetes mellitus, with fat layer exposed ([address] 5/22/2018    Disruption of closure of muscle or muscle flap, sequela 8/26/2020    DVT, lower extremity ([address]     right leg     Encounter for dental examination and cleaning with abnormal findings 4/2/2018    Gas gangrene of thigh ([address] 3/20/2020    History of DVT (deep vein thrombosis) 7/31/2018    Hyperglycemia due to type 2 diabetes mellitus ([address] 3/20/2020    Hyperlipidemia     Hypertension     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[address]     Osteomyelitis ([address] 10/24/2018    PVD (peripheral vascular disease) ([address]      Past Surgical History:   Procedure Laterality Date    AMPUTATION ABOVE KNEE Right 4/28/2020    DEBRIDEMENT OF AMPUTATION RIGHT ABOVE KNEE performed by Sanjana Kaufman MD at [facility] Right 4/30/2020    DEBRIDEMENT OF AMPUTATION RIGHT ABOVE KNEE,  POSS. ABOVE KNEE REVISION, POSS. CLOSURE, POSS.WOUND VAC -- BEN Benítez / [place] TO LOOK IN performed by Sanjana Kaufman MD at [place] [address] Right 3/20/2020    RIGHT LOWER EXTREMITY THROMBECTOMY, POSSIBLE ANGIOGRAM, POSSIBLE INTERVENTION, POSSIBLE BYPASS. performed by Sanjana Kaufman MD at [address] Right 4/17/2020    RIGHT LEG DEBRIDEMENT INCISION AND DRAINAGE performed by Zohra Villegas MD at [address] Right 4/20/2020    RIGHT THIGH WOUND DRESSING CHANGE; DEBRIDEMENT, removal of muscle performed by Zohra Villegas MD at [address] Right 4/21/2020    RIGHT THIGH WOUND DRESSING CHANGE POSSIBLE  DEBRIDEMENT - NEEDS BEN Benítez / JANETTE TO SEE performed by Stephan Radford MD at [address] Right 4/23/2020    RIGHT THIGH WOUND DRESSING CHANGE and DEBRIDEMENT performed by Sanjana Kaufman MD at [address] Right 10/15/2020    DEBRIDEMENT RIGHT ABOVE KNEE AMPUTATION POSS. REVISION POSS. WOUND VAC performed by Sanjana Kaufman MD at [address] Right 12/17/2020    DEBRIDEMENT  RIGHT ABOVE KNEE AMPUTATION WITH POSS.  ADVANCED SKIN THERAPY performed by Sanjana Kaufman MD at [address] Right 5/14/2021    DEBRIDEMENT RIGHT ABOVE KNEE AMPUTATION performed by Zohra Villegas MD at [address] [place] [address] Right 5/18/2021    DEBRIDEMENT ABOVE THE KNEE AMPUTATION , WITH WOUND VAC APPLICATION performed by Sanjana Kaufman MD at [address] [place] [address] Right 11/1/2018    AMPUTATION ABOVE KNEE RIGHT LEG performed by Sanjana Kaufman MD at [facility] Right 5/24/2018    RIGHT FOOT INCISION AND DRAINAGE WITH PARTIAL BONE RESECTION performed by Gail Mai DPM at [address] [place] [address] OFFICE/OUTPT VISIT,PROCEDURE ONLY Right 8/3/2018    INCISION AND DRAINAGE MULTIPLE AREAS RIGHT FOOT WITH DEBRIDEMENT SOFT TISSUE performed by Gail Mai DPM at [facility] OR    RHINOPLASTY Right     leg      Family History   Problem Relation Age of Onset    Cancer Mother     Diabetes Father     Heart Failure Father     Hypertension Father     Cancer Sister      Social History     Tobacco Use    Smoking status: Current Every Day Smoker     Packs/day: 0.50     Years: 7.00     Pack years: 3.50     Types: Cigarettes    Smokeless tobacco: Never Used   Vaping Use    Vaping Use: Never used   Substance Use Topics    Alcohol use: No    Drug use: No     No Known Allergies  Current Outpatient Medications on File Prior to Encounter   Medication Sig Dispense Refill    oxyCODONE (OXYCONTIN) 15 MG T12A extended release tablet Take 1 tablet by mouth every 12 hours for 30 days. 60 tablet 0    oxyCODONE-acetaminophen (PERCOCET) 7.5-325 MG per tablet Take 1 tablet by mouth every 6 hours as needed for Pain for up to 30 days.  Intended supply: 30 days 120 tablet 0    insulin glargine (LANTUS SOLOSTAR) 100 UNIT/ML injection pen Inject 25 Units into the skin nightly 5 pen 3    lactobacillus (CULTURELLE) CAPS capsule Take 2 capsules by mouth daily 60 capsule 1    DAPTOmycin (CUBICIN) 500 MG injection Infuse 10 mLs intravenously daily 420 mL 0    DULoxetine (CYMBALTA) 60 MG extended release capsule TAKE TWO CAPSULES BY MOUTH EVERY MORNING 180 capsule 1    chlorthalidone (HYGROTON) 25 MG tablet TAKE 1 TABLET BY MOUTH ONE TIME A DAY 30 tablet 3    mirtazapine (REMERON) 7.5 MG tablet TAKE 1 TABLET BY MOUTH ONE TIME A DAY NIGHTLY 90 tablet 1    insulin lispro (HUMALOG) 100 UNIT/ML injection vial Inject 15 Units into the skin 3 times daily (before meals) (Patient taking differently: Inject 12 Units into symmetrical, trachea midline   SKIN:  Open wound Present    Assessment:     Problem List Items Addressed This Visit     Disruption of closure of muscle or muscle flap, sequela - Primary (Chronic)    Relevant Orders    Initiate Outpatient Wound Care Protocol    Ischemic ulcer of right thigh with fat layer exposed [facility])    Relevant Orders    Initiate Outpatient Wound Care Protocol          Pre Debridement Measurements:  Are located in the [employer]  Documentation Flow Sheet  Post Debridement Measurements:  Wound/Ulcer Descriptions are Pre Debridement except measurements:     Wound 06/02/21 Hip Right #2 (Active)   Wound Image   06/02/21 1124   Wound Etiology Surgical 06/02/21 1124   Wound Length (cm) 0.3 cm 06/02/21 1124   Wound Width (cm) 1.3 cm 06/02/21 1124   Wound Depth (cm) 1.6 cm 06/02/21 1124   Wound Surface Area (cm^2) 0.39 cm^2 06/02/21 1124   Wound Volume (cm^3) 0.62 cm^3 06/02/21 1124   Undermining Starts ___ O'Clock 12 06/02/21 1124   Undermining Ends___ O'Clock 12 06/02/21 1124   Undermining Maxium Distance (cm) [email or web address] 06/02/21 1124   Wound Assessment Pink/red;Fibrin 06/02/21 1124   Drainage Amount Moderate 06/02/21 1124   Drainage Description Brown;Serosanguinous 06/02/21 1124   Odor None 06/02/21 1124   Nadia-wound Assessment Fragile; Maceration 06/02/21 1124   Number of days: 0         No debridement    Plan:   Treatment Note please see attached Discharge Instructions    Written patient dismissal instructions given to patient and signed by patient or POA. Discharge Instructions       Visit Discharge/Physician Orders     Discharge condition: Stable     Assessment of pain at discharge:yes     Anesthetic used: 4% lidocaine soln.     Discharge to: Home     Left via:Private automobile     Accompanied by: spouse     ECF/HHA: [facility]     Dressing Orders:RIGHT AKA SITE-Cleanse with normal saline, pack loosely with calcium alginate, dry dressing and secure.  Change daily.     Treatment Orders:Eat a diet high in protein and vitamin C. Take a multiple vitamin daily unless contraindicated.      Stop smoking! [facility] [facility] followup visit: 2 week _____________________________  (Please note your next appointment above and if you are unable to keep, kindly give a 24 hour notice.  Thank you.)     Physician signature:__________________________  Kareem Stanley  If you experience any of the following, please call the [facility] during business hours:     * Increase in Pain  * Temperature over 101  * Increase in drainage from your wound  * Drainage with a foul odor  * Bleeding  * Increase in swelling  * Need for compression bandage changes due to slippage, breakthrough drainage.     If you need medical attention outside of the business hours of the [facility] please contact your PCP or go to the nearest emergency room.                                                         Electronically signed by Ed Montoya MD

## 2021-06-08 DIAGNOSIS — I10 HYPERTENSION, UNSPECIFIED TYPE: ICD-10-CM

## 2021-06-08 RX ORDER — HYDRALAZINE HYDROCHLORIDE 25 MG/1
25 TABLET, FILM COATED ORAL 3 TIMES DAILY
Qty: 90 TABLET | Refills: 3 | Status: SHIPPED
Start: 2021-06-08 | End: 2021-09-28 | Stop reason: SDUPTHER

## 2021-06-09 ENCOUNTER — HOSPITAL ENCOUNTER (OUTPATIENT)
Dept: WOUND CARE | Age: 66
Discharge: HOME | End: 2021-06-09
Payer: COMMERCIAL

## 2021-06-09 VITALS
HEART RATE: 83 BPM | TEMPERATURE: 98.1 F | RESPIRATION RATE: 16 BRPM | DIASTOLIC BLOOD PRESSURE: 84 MMHG | SYSTOLIC BLOOD PRESSURE: 130 MMHG

## 2021-06-09 DIAGNOSIS — L97.112: ICD-10-CM

## 2021-06-09 DIAGNOSIS — T81.32XS: Primary | ICD-10-CM

## 2021-06-09 PROCEDURE — 11042 DBRDMT SUBQ TIS 1ST 20SQCM/<: CPT | Performed by: SURGERY

## 2021-06-09 PROCEDURE — 11042 DBRDMT SUBQ TIS 1ST 20SQCM/<: CPT

## 2021-06-09 RX ORDER — GINSENG 100 MG
CAPSULE ORAL ONCE
Status: CANCELLED | OUTPATIENT
Start: 2021-06-09 | End: 2021-06-09

## 2021-06-09 RX ORDER — BACITRACIN, NEOMYCIN, POLYMYXIN B 400; 3.5; 5 [USP'U]/G; MG/G; [USP'U]/G
OINTMENT TOPICAL ONCE
Status: CANCELLED | OUTPATIENT
Start: 2021-06-09 | End: 2021-06-09

## 2021-06-09 RX ORDER — LIDOCAINE HYDROCHLORIDE 20 MG/ML
JELLY TOPICAL ONCE
Status: CANCELLED | OUTPATIENT
Start: 2021-06-09 | End: 2021-06-09

## 2021-06-09 RX ORDER — CLOBETASOL PROPIONATE 0.5 MG/G
OINTMENT TOPICAL ONCE
Status: CANCELLED | OUTPATIENT
Start: 2021-06-09 | End: 2021-06-09

## 2021-06-09 RX ORDER — LIDOCAINE HYDROCHLORIDE 40 MG/ML
SOLUTION TOPICAL ONCE
Status: CANCELLED | OUTPATIENT
Start: 2021-06-09 | End: 2021-06-09

## 2021-06-09 RX ORDER — LIDOCAINE HYDROCHLORIDE 40 MG/ML
SOLUTION TOPICAL ONCE
Status: DISCONTINUED | OUTPATIENT
Start: 2021-06-09 | End: 2021-06-10 | Stop reason: HOSPADM

## 2021-06-09 RX ORDER — LIDOCAINE 40 MG/G
CREAM TOPICAL ONCE
Status: CANCELLED | OUTPATIENT
Start: 2021-06-09 | End: 2021-06-09

## 2021-06-09 RX ORDER — LIDOCAINE 50 MG/G
OINTMENT TOPICAL ONCE
Status: CANCELLED | OUTPATIENT
Start: 2021-06-09 | End: 2021-06-09

## 2021-06-09 RX ORDER — BETAMETHASONE DIPROPIONATE 0.05 %
OINTMENT (GRAM) TOPICAL ONCE
Status: CANCELLED | OUTPATIENT
Start: 2021-06-09 | End: 2021-06-09

## 2021-06-09 RX ORDER — BACITRACIN ZINC AND POLYMYXIN B SULFATE 500; 1000 [USP'U]/G; [USP'U]/G
OINTMENT TOPICAL ONCE
Status: CANCELLED | OUTPATIENT
Start: 2021-06-09 | End: 2021-06-09

## 2021-06-09 RX ORDER — GENTAMICIN SULFATE 1 MG/G
OINTMENT TOPICAL ONCE
Status: CANCELLED | OUTPATIENT
Start: 2021-06-09 | End: 2021-06-09

## 2021-06-09 ASSESSMENT — PAIN DESCRIPTION - ORIENTATION: ORIENTATION: RIGHT

## 2021-06-09 ASSESSMENT — PAIN DESCRIPTION - DESCRIPTORS: DESCRIPTORS: BURNING

## 2021-06-09 ASSESSMENT — PAIN DESCRIPTION - PAIN TYPE: TYPE: CHRONIC PAIN

## 2021-06-09 ASSESSMENT — PAIN DESCRIPTION - LOCATION: LOCATION: LEG

## 2021-06-09 ASSESSMENT — PAIN SCALES - GENERAL: PAINLEVEL_OUTOF10: 6

## 2021-06-09 NOTE — PROGRESS NOTES
[facility]aling Center Followup Visit Note    Referring Physician : Nohelia Norris Str RECORD NUMBER:  [id number]  AGE: 63 y.o. GENDER: male  : 1955  EPISODE DATE:  2021    Subjective:     No chief complaint on file. HISTORY of PRESENT ILLNESS HPI   Lisa Abrams is a 63 y.o. male who presents today in regards to follow up evaluation and treatment of wound/ulcer. That patient's past medical, family and social hx were reviewed and changes were made if present. History of Wound Context:  Pt presents in regards to chronic R above knee amputation wound was since 2020. He had developed postoperatively dehiscence of his wound site which had previously undergone a muscle flap. He was having a packed per home health care. The wound had gotten so small that it was difficult to pack. He is still having drainage significant though. At first visit to wound healing center 20 his wound has not been improving. He was not on abx at initial visit to center.     Previous lower extremity procedures  2018  R AKA for chronic R LE wound   3/20/2020 R groin exploration with iliofemoral artery embolectomy, primary closure of arteriotomy, R deep femoral atery embolectomy   2020 Excisional debridement of infected necrotic  tissue, right groin and right thigh   2020 RIGHT THIGH WOUND DRESSING CHANGE and DEBRIDEMENT   2020 Irrigation and excisional debridement of Right Thigh above knee amputation wound   2020 Irrigation and excisional debridement of the Right thigh above knee amputation wound 25 x45 cm  Excision of prosthetic R LE bypass graft  Ligation of right SFA  Posterior hamstring myocutaneous flap 65 cm flap for open wound of 45 x 25 cm per Dr. Brian Rajan     20  · Wound itself is small - opened up as difficult to pack  · 5 cm depth - culture done, significant amount of fluid expressed once opened up  · ALENTY packing  · Discussed potential need for further surgical exploration in future - pt would like to avoid hospital and OR if possible  9/2/20  · Wound much improved - depth less  · Start doxycyline for staph culture  9/9/20  · Depth 5 cm again, fluid seems less  9/23/20  · Tunneling now 8 cm, concern that hittig bone  · Discussed with patient continuing current reigmen unlikely to result in healing  · Would benefit from further surgical debridement, likely revision of amputation site and possible wound vac  · Patient would like to consider options and will let me know next week  9/30/20  · Significant tunneling still  · Will proceed with or in 2 weeks per pt request  10/21/20  · OR 10/14/20  · dakins moistened kerlex  · Discussed with Dr. Jame Chopra re pain control issues  · Doxycycline 10 days script given  10/28/20  · Pain better controlled  · + Granulation tissue  · Bone less exposed  11/4/20  · Wound health  · Bone still exposed  11/18/20  · Wound continues to be improved  · Less bone exposed  11/25/20  · Bone exposure still an issue  · Will take for surgery - try dermagraft  · If not successful will likely need revision in future  12/9/20  · Bone exposure, wound overall improvd  · Patient did not follow though with testing for surgery  12/17/20  · dermagraft application  30/39/78  · Wound improved, bone still exposed  1/6/21  · Wound improved, bone still exposed  1/13/21  · Bone almost completely covered  1/27/21  · Bone covered, wound improved  2/10/21  · wound improved,depth less  2/24/21  · Wound stable - 3.5 cm depth  3/10/21  · Wound stable 4 cm depth  3/24/21  · Wound stable, same depth not improving - discussed with patient concerning and may need further debridement  3/31/21  · Depth improved 3.5 cm  4/14/21  · Depth improved 3 cm   4/21/21  · Depth improved 2.8 cm  4/28/21  · Depth stable  5/12/21  · Increased pain  · Suspect wound has closed down to small and needs opened up as fluid is being trapped  · Will admit through er  · Or planned for tomorrow  5/12-20/21  · OR debridement, resection of more femur 5/14/21, Debridement and closure 5/18/21 6/2/21  · Most of wound is closed  · Small area laterally that is open and has ~ 2 cm depth - pack with aquacell  6/9/21  · Sutures removed  · Lateral wound 4 cm dept - pack     Wound/Ulcer Pain Timing/Severity:  severe  Quality of pain: aching, throbbing, shooting   Severity:  8 / 10   Modifying Factors: Pain worsens with dressing changes, pressure, debridement  Associated Signs/Symptoms: drainage and pain    Ulcer Identification:  Ulcer Type: arterial, diabetic, pressure and non-healing surgical  Contributing Factors: edema, diabetes, poor glucose control, chronic pressure, decreased mobility, shear force, obesity and arterial insufficiency    Diabetic/Pressure/Non Pressure Ulcers only:  Ulcer: Diabetic ulcer, fat layer exposed    Wound: Wound dehiscence        PAST MEDICAL HISTORY      Diagnosis Date    Acute kidney injury ([address] 5/18/2020    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 ([address] 5/22/2018    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 ([address] 5/21/2018    Cellulitis, scrotum 3/20/2020    Critical lower limb ischemia ([address] 3/20/2020    Diabetes mellitus ([address]     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 2 diabetes mellitus, with fat layer exposed ([address] 5/22/2018    Disruption of closure of muscle or muscle flap, sequela 8/26/2020    DVT, lower extremity (HCC)     right leg     Encounter for dental examination and cleaning with abnormal findings 4/2/2018    Gas gangrene of thigh ([address] 3/20/2020    History of DVT (deep vein thrombosis) 7/31/2018    Hyperglycemia due to type 2 diabetes mellitus ([address] 3/20/2020    Hyperlipidemia     Hypertension     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[address]     Osteomyelitis ([address] 10/24/2018    PVD (peripheral vascular disease) ([address]      Past Surgical History:   Procedure Laterality Date  AMPUTATION ABOVE KNEE Right 4/28/2020    DEBRIDEMENT OF AMPUTATION RIGHT ABOVE KNEE performed by Tri Morales MD at [facility] Right 4/30/2020    DEBRIDEMENT OF AMPUTATION RIGHT ABOVE KNEE,  POSS. ABOVE KNEE REVISION, POSS. CLOSURE, POSS.WOUND VAC -- BEN Huynh / Lucie Qureshi TO LOOK IN performed by Tri Morales MD at [address] Right 3/20/2020    RIGHT LOWER EXTREMITY THROMBECTOMY, POSSIBLE ANGIOGRAM, POSSIBLE INTERVENTION, POSSIBLE BYPASS. performed by Tri Morales MD at [address] Right 4/17/2020    RIGHT LEG DEBRIDEMENT INCISION AND DRAINAGE performed by Raleigh Giron MD at [address] Right 4/20/2020    RIGHT THIGH WOUND DRESSING CHANGE; DEBRIDEMENT, removal of muscle performed by Raleigh Giron MD at [address] Right 4/21/2020    RIGHT THIGH WOUND DRESSING CHANGE POSSIBLE  DEBRIDEMENT - NEEDS BEN Huynh / JANETTE TO SEE performed by Yves Damico MD at [address] Right 4/23/2020    RIGHT THIGH WOUND DRESSING CHANGE and DEBRIDEMENT performed by Tri Morales MD at [address] Right 10/15/2020    DEBRIDEMENT RIGHT ABOVE KNEE AMPUTATION POSS. REVISION POSS. WOUND VAC performed by Tri Morales MD at [address] Right 12/17/2020    DEBRIDEMENT  RIGHT ABOVE KNEE AMPUTATION WITH POSS.  ADVANCED SKIN THERAPY performed by Tri Morales MD at [address] Right 5/14/2021    DEBRIDEMENT RIGHT ABOVE KNEE AMPUTATION performed by Raleigh Giron MD at [address] Right 5/18/2021    DEBRIDEMENT ABOVE THE KNEE AMPUTATION , WITH WOUND VAC APPLICATION performed by Tri Morales MD at [facility] Right 11/1/2018    AMPUTATION ABOVE KNEE RIGHT LEG performed by Tri Morales MD at [address] [place] [address] INFEC,[address] Right 5/24/2018    RIGHT FOOT INCISION AND DRAINAGE WITH PARTIAL BONE RESECTION performed by Gail Mai DPM at [address] [facility] OFFICE/OUTPT VISIT,PROCEDURE ONLY Right 8/3/2018    INCISION AND DRAINAGE MULTIPLE AREAS RIGHT FOOT WITH DEBRIDEMENT SOFT TISSUE performed by Gail Mai DPM at [facility] Right     leg      Family History   Problem Relation Age of Onset   [facility] Cancer Mother     Diabetes Father     Heart Failure Father     Hypertension Father     Cancer Sister      Social History     Tobacco Use    Smoking status: Current Every Day Smoker     Packs/day: 0.50     Years: 7.00     Pack years: 3.50     Types: Cigarettes    Smokeless tobacco: Never Used   Vaping Use    Vaping Use: Never used   Substance Use Topics    Alcohol use: No    Drug use: No     No Known Allergies  Current Outpatient Medications on File Prior to Encounter   Medication Sig Dispense Refill    metoprolol tartrate (LOPRESSOR) 25 MG tablet TAKE ONE TABLET BY MOUTH TWO TIMES DAILY 90 tablet 1    hydrALAZINE (APRESOLINE) 25 MG tablet TAKE 1 TABLET BY MOUTH 3 TIMES DAILY. 90 tablet 3    oxyCODONE (OXYCONTIN) 15 MG T12A extended release tablet Take 1 tablet by mouth every 12 hours for 30 days. 60 tablet 0    oxyCODONE-acetaminophen (PERCOCET) 7.5-325 MG per tablet Take 1 tablet by mouth every 6 hours as needed for Pain for up to 30 days.  Intended supply: 30 days 120 tablet 0    insulin glargine (LANTUS SOLOSTAR) 100 UNIT/ML injection pen Inject 25 Units into the skin nightly 5 pen 3    Insulin Pen Needle 29G X 12MM MISC 1 each by Does not apply route daily 100 each 3    lactobacillus (CULTURELLE) CAPS capsule Take 2 capsules by mouth daily 60 capsule 1    DAPTOmycin (CUBICIN) 500 MG injection Infuse 10 mLs intravenously daily 420 mL 0    DULoxetine (CYMBALTA) 60 MG extended release capsule TAKE TWO CAPSULES BY MOUTH EVERY MORNING 180 capsule 1    chlorthalidone (HYGROTON) 25 MG tablet TAKE 1 TABLET BY MOUTH ONE TIME A DAY 30 tablet 3    mirtazapine (REMERON) 7.5 MG tablet TAKE 1 TABLET BY MOUTH ONE TIME A DAY NIGHTLY 90 tablet 1    insulin lispro (HUMALOG) 100 UNIT/ML injection vial Inject 15 Units into the skin 3 times daily (before meals) (Patient taking differently: Inject 12 Units into the skin 3 times daily (before meals) For every 20 units above 200 mg/dL patient takes an additional 2 units) 15 vial 3    ammonium lactate (LAC-HYDRIN) 12 % lotion Apply topically as needed. 1 Bottle 0    gabapentin (NEURONTIN) 800 MG tablet Take 1 tablet by mouth 4 times daily for 30 days. 120 tablet 5    amLODIPine (NORVASC) 10 MG tablet Take 1 tablet by mouth daily 90 tablet 1    cloNIDine (CATAPRES) 0.1 MG tablet TAKE ONE TABLET BY MOUTH THREE TIMES DAILY 270 tablet 3    cilostazol (PLETAL) 50 MG tablet Take 1 tablet by mouth 2 times daily 180 tablet 1    mineral oil-hydrophilic petrolatum (AQUAPHOR) ointment Apply topically twice daily dispense 14 ounce jar (Patient taking differently: Apply topically once a week ) 396 g 5    blood glucose test strips (ONETOUCH ULTRA) strip 1 each by Other route 3 times daily As needed. 300 each 1    Insulin Syringe-Needle U-100 (ULTICARE INSULIN SYRINGE) 31G X 5/16\" 0.3 ML MISC 1 each by Other route 5 times daily 100 each 3    Handicap Placard MISC by Does not apply route Patient cannot walk 200 ft without stopping to rest.    Expiration 10/21/2024 1 each 0     No current facility-administered medications on file prior to encounter.        REVIEW OF SYSTEMS See HPI    Objective:    /84   Pulse 83   Temp 98.1 °F (36.7 °C) (Temporal)   Resp 16   Wt Readings from Last 3 Encounters:   06/02/21 245 lb (111.1 kg)   05/27/21 245 lb (111.1 kg)   05/12/21 245 lb (111.1 kg)     PHYSICAL EXAM  CONSTITUTIONAL:   Awake, alert, cooperative   EYES:  lids and lashes normal   ENT: external ears and nose without lesions   NECK:  supple, symmetrical, trachea midline   SKIN: Open wound Present    Assessment:     Problem List Items Addressed This Visit     Disruption of closure of muscle or muscle flap, sequela - Primary (Chronic)    Relevant Medications    lidocaine (XYLOCAINE) 4 % external solution    Other Relevant Orders    Initiate Outpatient Wound Care Protocol    Ischemic ulcer of right thigh with fat layer exposed ([address]    Relevant Medications    lidocaine (XYLOCAINE) 4 % external solution    Other Relevant Orders    Initiate Outpatient Wound Care Protocol          Pre Debridement Measurements:  Are located in the [facility]  Documentation Flow Sheet  Post Debridement Measurements:  Wound/Ulcer Descriptions are Pre Debridement except measurements:     Wound 06/02/21 Hip Right #2 (Active)   Wound Image   06/02/21 1124   Wound Etiology Surgical 06/02/21 1124   Dressing Status New dressing applied 06/02/21 1152   Wound Cleansed Cleansed with saline 06/02/21 1152   Dressing/Treatment Alginate;Dry dressing 06/02/21 1152   Offloading for Diabetic Foot Ulcers No offloading required 06/02/21 1152   Wound Length (cm) 0.3 cm 06/09/21 1205   Wound Width (cm) 0.9 cm 06/09/21 1205   Wound Depth (cm) 2.1 cm 06/09/21 1205   Wound Surface Area (cm^2) 0.27 cm^2 06/09/21 1205   Change in Wound Size % (l*w) 30.77 06/09/21 1205   Wound Volume (cm^3) 0.57 cm^3 06/09/21 1205   Wound Healing % 8 06/09/21 1205   Post-Procedure Length (cm) 0.4 cm 06/09/21 1250   Post-Procedure Width (cm) 0.9 cm 06/09/21 1250   Post-Procedure Depth (cm) 2.1 cm 06/09/21 1250   Post-Procedure Surface Area (cm^2) 0.36 cm^2 06/09/21 1250   Post-Procedure Volume (cm^3) 0.76 cm^3 06/09/21 1250   Distance Tunneling (cm) 4 cm 06/09/21 1205   Tunneling Position ___ O'Clock 12 06/09/21 1205   Undermining Starts ___ O'Clock 12 06/02/21 1124   Undermining Ends___ O'Clock 12 06/02/21 1124   Undermining Maxium Distance (cm) [email or web address] 06/02/21 1124   Wound Assessment Pink/red;Fibrin 06/09/21 1205   Drainage Amount Moderate 06/09/21 1205 Drainage Description Brown;Serosanguinous 06/09/21 1205   Odor None 06/09/21 1205   Nadia-wound Assessment Fragile 06/09/21 1205   Number of days: 7         Procedure Note  Indications:  Based on my examination of this patient's wound(s)/ulcer(s) today, debridement is required to promote healing and evaluate the wound base. Performed by: Aldair Muro MD    Consent obtained:  Yes    Time out taken:  Yes    Pain Control: Anesthetic  Anesthetic: 4% Lidocaine Liquid Topical     Debridement:Excisional Debridement    Using curette the wound(s)/ulcer(s) was/were sharply debrided down through and including the removal of epidermis, dermis and subcutaneous tissue. Devitalized Tissue Debrided:  fibrin, biofilm, slough and exudate to stimulate bleeding to promote healing, post debridement good bleeding base and wound edges noted    Wound/Ulcer #: 1    Percent of Wound/Ulcer Debrided: 100%    Total Surface Area Debrided:  0.27 sq cm     Estimated Blood Loss:  Minimal  Hemostasis Achieved:  by pressure    Procedural Pain:  10  / 10   Post Procedural Pain:  9 / 10     Response to treatment:  With complaints of pain    Plan:   Treatment Note please see attached Discharge Instructions    Written patient dismissal instructions given to patient and signed by patient or POA. Discharge Instructions        Visit Discharge/Physician Orders     Discharge condition: Stable     Assessment of pain at discharge:yes     Anesthetic used: 4% lidocaine soln.     Discharge to: Home     Left via:Private automobile     Accompanied by: spouse     ECF/HHA: [facility])(note tunneling at 12 of 4cm's)     Dressing Orders:RIGHT AKA SITE-Cleanse with normal saline, pack loosely with calcium alginate, dry dressing and secure. Change daily.     Treatment Orders:Eat a diet high in protein and vitamin C.  Take a multiple vitamin daily unless contraindicated.      Stop smoking!     [facility] followup visit: 2 weeks _____________________________  (Please note your next appointment above and if you are unable to keep, kindly give a 24 hour notice.  Thank you.)     Physician signature:__________________________  Jomarie Rubinstein  If you experience any of the following, please call the [facility] during business hours:     * Increase in Pain  * Temperature over 101  * Increase in drainage from your wound  * Drainage with a foul odor  * Bleeding  * Increase in swelling  * Need for compression bandage changes due to slippage, breakthrough drainage.     If you need medical attention outside of the business hours of the [facility] please contact your PCP or go to the nearest emergency room.                                                                  Electronically signed by Esdras Rapp MD

## 2021-06-23 ENCOUNTER — HOSPITAL ENCOUNTER (OUTPATIENT)
Dept: WOUND CARE | Age: 66
Discharge: HOME | End: 2021-06-23
Payer: COMMERCIAL

## 2021-06-23 VITALS
SYSTOLIC BLOOD PRESSURE: 138 MMHG | DIASTOLIC BLOOD PRESSURE: 76 MMHG | RESPIRATION RATE: 18 BRPM | HEART RATE: 80 BPM | TEMPERATURE: 97.7 F

## 2021-06-23 DIAGNOSIS — T81.32XS: Primary | ICD-10-CM

## 2021-06-23 DIAGNOSIS — L97.112: ICD-10-CM

## 2021-06-23 PROCEDURE — 11042 DBRDMT SUBQ TIS 1ST 20SQCM/<: CPT | Performed by: SURGERY

## 2021-06-23 PROCEDURE — 11042 DBRDMT SUBQ TIS 1ST 20SQCM/<: CPT

## 2021-06-23 RX ORDER — LIDOCAINE HYDROCHLORIDE 20 MG/ML
JELLY TOPICAL ONCE
Status: CANCELLED | OUTPATIENT
Start: 2021-06-23 | End: 2021-06-23

## 2021-06-23 RX ORDER — LIDOCAINE HYDROCHLORIDE 40 MG/ML
SOLUTION TOPICAL ONCE
Status: COMPLETED | OUTPATIENT
Start: 2021-06-23 | End: 2021-06-23

## 2021-06-23 RX ORDER — CLOBETASOL PROPIONATE 0.5 MG/G
OINTMENT TOPICAL ONCE
Status: CANCELLED | OUTPATIENT
Start: 2021-06-23 | End: 2021-06-23

## 2021-06-23 RX ORDER — LIDOCAINE HYDROCHLORIDE 40 MG/ML
SOLUTION TOPICAL ONCE
Status: CANCELLED | OUTPATIENT
Start: 2021-06-23 | End: 2021-06-23

## 2021-06-23 RX ORDER — BETAMETHASONE DIPROPIONATE 0.05 %
OINTMENT (GRAM) TOPICAL ONCE
Status: CANCELLED | OUTPATIENT
Start: 2021-06-23 | End: 2021-06-23

## 2021-06-23 RX ORDER — BACITRACIN, NEOMYCIN, POLYMYXIN B 400; 3.5; 5 [USP'U]/G; MG/G; [USP'U]/G
OINTMENT TOPICAL ONCE
Status: CANCELLED | OUTPATIENT
Start: 2021-06-23 | End: 2021-06-23

## 2021-06-23 RX ORDER — GINSENG 100 MG
CAPSULE ORAL ONCE
Status: CANCELLED | OUTPATIENT
Start: 2021-06-23 | End: 2021-06-23

## 2021-06-23 RX ORDER — LIDOCAINE 40 MG/G
CREAM TOPICAL ONCE
Status: CANCELLED | OUTPATIENT
Start: 2021-06-23 | End: 2021-06-23

## 2021-06-23 RX ORDER — BACITRACIN ZINC AND POLYMYXIN B SULFATE 500; 1000 [USP'U]/G; [USP'U]/G
OINTMENT TOPICAL ONCE
Status: CANCELLED | OUTPATIENT
Start: 2021-06-23 | End: 2021-06-23

## 2021-06-23 RX ORDER — GENTAMICIN SULFATE 1 MG/G
OINTMENT TOPICAL ONCE
Status: CANCELLED | OUTPATIENT
Start: 2021-06-23 | End: 2021-06-23

## 2021-06-23 RX ORDER — LIDOCAINE 50 MG/G
OINTMENT TOPICAL ONCE
Status: CANCELLED | OUTPATIENT
Start: 2021-06-23 | End: 2021-06-23

## 2021-06-23 RX ADMIN — LIDOCAINE HYDROCHLORIDE: 40 SOLUTION TOPICAL at 11:38

## 2021-06-23 ASSESSMENT — PAIN DESCRIPTION - FREQUENCY: FREQUENCY: INTERMITTENT

## 2021-06-23 ASSESSMENT — PAIN DESCRIPTION - DESCRIPTORS: DESCRIPTORS: SHARP

## 2021-06-23 ASSESSMENT — PAIN SCALES - GENERAL: PAINLEVEL_OUTOF10: 10

## 2021-06-23 ASSESSMENT — PAIN DESCRIPTION - ONSET: ONSET: ON-GOING

## 2021-06-23 ASSESSMENT — PAIN DESCRIPTION - ORIENTATION: ORIENTATION: RIGHT

## 2021-06-23 ASSESSMENT — PAIN - FUNCTIONAL ASSESSMENT: PAIN_FUNCTIONAL_ASSESSMENT: PREVENTS OR INTERFERES SOME ACTIVE ACTIVITIES AND ADLS

## 2021-06-23 ASSESSMENT — PAIN DESCRIPTION - LOCATION: LOCATION: LEG

## 2021-06-23 ASSESSMENT — PAIN DESCRIPTION - PAIN TYPE: TYPE: CHRONIC PAIN

## 2021-06-23 NOTE — PLAN OF CARE
Problem: Wound:  Goal: Will show signs of wound healing; wound closure and no evidence of infection  Description: Will show signs of wound healing; wound closure and no evidence of infection  Outcome: Met This Shift     Problem: Arterial:  Goal: Optimize blood flow for wound healing  Description: Optimize blood flow for wound healing  Outcome: Met This Shift     Problem: Smoking cessation:  Goal: Ability to formulate a plan to maintain a tobacco-free life will be supported  Description: Ability to formulate a plan to maintain a tobacco-free life will be supported  Outcome: Met This Shift     Problem: Weight control:  Goal: Ability to maintain an optimal weight for height and age will be supported  Description: Ability to maintain an optimal weight for height and age will be supported  Outcome: Met This Shift     Problem: Falls - Risk of:  Goal: Will remain free from falls  Description: Will remain free from falls  Outcome: Met This Shift     Problem: Pain:  Goal: Pain level will decrease  Description: Pain level will decrease  Outcome: Met This Shift  Goal: Control of acute pain  Description: Control of acute pain  Outcome: Met This Shift  Goal: Control of chronic pain  Description: Control of chronic pain  Outcome: Met This Shift

## 2021-06-23 NOTE — PROGRESS NOTES
[facility]aling Center Followup Visit Note    Referring Physician : Nohelia Norris Str RECORD NUMBER:  [id number]  AGE: 63 y.o. GENDER: male  : 1955  EPISODE DATE:  2021    Subjective:     Chief Complaint   Patient presents with    Wound Check     RLE      HISTORY of PRESENT ILLNESS HPI   Chichi Donohue is a 63 y.o. male who presents today in regards to follow up evaluation and treatment of wound/ulcer. That patient's past medical, family and social hx were reviewed and changes were made if present. History of Wound Context:  Pt presents in regards to chronic R above knee amputation wound was since 2020. He had developed postoperatively dehiscence of his wound site which had previously undergone a muscle flap. He was having a packed per home health care. The wound had gotten so small that it was difficult to pack. He is still having drainage significant though. At first visit to wound healing center 20 his wound has not been improving. He was not on abx at initial visit to center.     Previous lower extremity procedures  2018  R AKA for chronic R LE wound   3/20/2020 R groin exploration with iliofemoral artery embolectomy, primary closure of arteriotomy, R deep femoral atery embolectomy   2020 Excisional debridement of infected necrotic  tissue, right groin and right thigh   2020 RIGHT THIGH WOUND DRESSING CHANGE and DEBRIDEMENT   2020 Irrigation and excisional debridement of Right Thigh above knee amputation wound   2020 Irrigation and excisional debridement of the Right thigh above knee amputation wound 25 x45 cm  Excision of prosthetic R LE bypass graft  Ligation of right SFA  Posterior hamstring myocutaneous flap 65 cm flap for open wound of 45 x 25 cm per Dr. Doni Nicholson     20  · Wound itself is small - opened up as difficult to pack  · 5 cm depth - culture done, significant amount of fluid expressed once opened up  · aquacell ag packing  · Discussed potential need for further surgical exploration in future - pt would like to avoid hospital and OR if possible  9/2/20  · Wound much improved - depth less  · Start doxycyline for staph culture  9/9/20  · Depth 5 cm again, fluid seems less  9/23/20  · Tunneling now 8 cm, concern that hittig bone  · Discussed with patient continuing current reigmen unlikely to result in healing  · Would benefit from further surgical debridement, likely revision of amputation site and possible wound vac  · Patient would like to consider options and will let me know next week  9/30/20  · Significant tunneling still  · Will proceed with or in 2 weeks per pt request  10/21/20  · OR 10/14/20  · dakins moistened kerlex  · Discussed with Dr. Dakotah Isbell re pain control issues  · Doxycycline 10 days script given  10/28/20  · Pain better controlled  · + Granulation tissue  · Bone less exposed  11/4/20  · Wound health  · Bone still exposed  11/18/20  · Wound continues to be improved  · Less bone exposed  11/25/20  · Bone exposure still an issue  · Will take for surgery - try dermagraft  · If not successful will likely need revision in future  12/9/20  · Bone exposure, wound overall improvd  · Patient did not follow though with testing for surgery  12/17/20  · dermagraft application  35/65/14  · Wound improved, bone still exposed  1/6/21  · Wound improved, bone still exposed  1/13/21  · Bone almost completely covered  1/27/21  · Bone covered, wound improved  2/10/21  · wound improved,depth less  2/24/21  · Wound stable - 3.5 cm depth  3/10/21  · Wound stable 4 cm depth  3/24/21  · Wound stable, same depth not improving - discussed with patient concerning and may need further debridement  3/31/21  · Depth improved 3.5 cm  4/14/21  · Depth improved 3 cm   4/21/21  · Depth improved 2.8 cm  4/28/21  · Depth stable  5/12/21  · Increased pain  · Suspect wound has closed down to small and needs opened up as fluid is 4/2/2018    Gas gangrene of thigh ([facility] 3/20/2020    History of DVT (deep vein thrombosis) 7/31/2018    Hyperglycemia due to type 2 diabetes mellitus ([facility] 3/20/2020    Hyperlipidemia     Hypertension     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[facility]     Osteomyelitis ([facility] 10/24/2018    PVD (peripheral vascular disease) ([facility]      Past Surgical History:   Procedure Laterality Date    AMPUTATION ABOVE KNEE Right 4/28/2020    DEBRIDEMENT OF AMPUTATION RIGHT ABOVE KNEE performed by Marlon Polanco MD at [address] [place] [address] Right 4/30/2020    DEBRIDEMENT OF AMPUTATION RIGHT ABOVE KNEE,  POSS. ABOVE KNEE REVISION, POSS. CLOSURE, POSS.WOUND VAC -- BEN Bush / Marcial Mason TO LOOK IN performed by Marlon Polanco MD at [place] [address] Right 3/20/2020    RIGHT LOWER EXTREMITY THROMBECTOMY, POSSIBLE ANGIOGRAM, POSSIBLE INTERVENTION, POSSIBLE BYPASS. performed by Marlon Polanco MD at [address] Right 4/17/2020    RIGHT LEG DEBRIDEMENT INCISION AND DRAINAGE performed by Alicia Villasenor MD at [address] Right 4/20/2020    RIGHT THIGH WOUND DRESSING CHANGE; DEBRIDEMENT, removal of muscle performed by Alicia Villasenor MD at [address] Right 4/21/2020    RIGHT THIGH WOUND DRESSING CHANGE POSSIBLE  DEBRIDEMENT - NEEDS BEN Bush / JANETTE TO SEE performed by Bertha Greenfield MD at [address] [facility] [address] Right 4/23/2020    RIGHT THIGH WOUND DRESSING CHANGE and DEBRIDEMENT performed by Marlon Polanco MD at [address] [facility]  Right 10/15/2020    DEBRIDEMENT RIGHT ABOVE KNEE AMPUTATION POSS. REVISION POSS. WOUND VAC performed by Marlon Polanco MD at [address] Right 12/17/2020    DEBRIDEMENT  RIGHT ABOVE KNEE AMPUTATION WITH POSS.  ADVANCED SKIN THERAPY performed by Marlon Polanco MD at [address] [facility]  Right 5/14/2021    DEBRIDEMENT RIGHT ABOVE KNEE AMPUTATION performed by Remy Fine MD at [address] Right 5/18/2021    DEBRIDEMENT ABOVE THE KNEE AMPUTATION , WITH WOUND VAC APPLICATION performed by Estela Ag MD at [facility] Right 11/1/2018    AMPUTATION ABOVE KNEE RIGHT LEG performed by Estela Ag MD at [facility] Right 5/24/2018    RIGHT FOOT INCISION AND DRAINAGE WITH PARTIAL BONE RESECTION performed by Luigi Go DPM at [address] OFFICE/OUTPT VISIT,PROCEDURE ONLY Right 8/3/2018    INCISION AND DRAINAGE MULTIPLE AREAS RIGHT FOOT WITH DEBRIDEMENT SOFT TISSUE performed by Luigi Go DPM at [facility] OR    RHINOPLASTY Right     leg      Family History   Problem Relation Age of Onset    Cancer Mother     Diabetes Father     Heart Failure Father     Hypertension Father     Cancer Sister      Social History     Tobacco Use    Smoking status: Current Every Day Smoker     Packs/day: 0.50     Years: 7.00     Pack years: 3.50     Types: Cigarettes    Smokeless tobacco: Never Used   Vaping Use    Vaping Use: Never used   Substance Use Topics    Alcohol use: No    Drug use: No     No Known Allergies  Current Outpatient Medications on File Prior to Encounter   Medication Sig Dispense Refill    metoprolol tartrate (LOPRESSOR) 25 MG tablet TAKE ONE TABLET BY MOUTH TWO TIMES DAILY 90 tablet 1    hydrALAZINE (APRESOLINE) 25 MG tablet TAKE 1 TABLET BY MOUTH 3 TIMES DAILY. 90 tablet 3    oxyCODONE (OXYCONTIN) 15 MG T12A extended release tablet Take 1 tablet by mouth every 12 hours for 30 days. 60 tablet 0    oxyCODONE-acetaminophen (PERCOCET) 7.5-325 MG per tablet Take 1 tablet by mouth every 6 hours as needed for Pain for up to 30 days.  Intended supply: 30 days 120 tablet 0    insulin glargine (LANTUS SOLOSTAR) 100 UNIT/ML injection pen Inject 25 Units into the skin nightly 5 pen 3    Insulin Pen Needle 29G X 12MM MISC 1 each by Does not apply route daily 100 each 3    lactobacillus (CULTURELLE) CAPS capsule Take 2 capsules by mouth daily 60 capsule 1    DAPTOmycin (CUBICIN) 500 MG injection Infuse 10 mLs intravenously daily 420 mL 0    DULoxetine (CYMBALTA) 60 MG extended release capsule TAKE TWO CAPSULES BY MOUTH EVERY MORNING 180 capsule 1    chlorthalidone (HYGROTON) 25 MG tablet TAKE 1 TABLET BY MOUTH ONE TIME A DAY 30 tablet 3    mirtazapine (REMERON) 7.5 MG tablet TAKE 1 TABLET BY MOUTH ONE TIME A DAY NIGHTLY 90 tablet 1    insulin lispro (HUMALOG) 100 UNIT/ML injection vial Inject 15 Units into the skin 3 times daily (before meals) (Patient taking differently: Inject 12 Units into the skin 3 times daily (before meals) For every 20 units above 200 mg/dL patient takes an additional 2 units) 15 vial 3    ammonium lactate (LAC-HYDRIN) 12 % lotion Apply topically as needed. 1 Bottle 0    amLODIPine (NORVASC) 10 MG tablet Take 1 tablet by mouth daily 90 tablet 1    cloNIDine (CATAPRES) 0.1 MG tablet TAKE ONE TABLET BY MOUTH THREE TIMES DAILY 270 tablet 3    cilostazol (PLETAL) 50 MG tablet Take 1 tablet by mouth 2 times daily 180 tablet 1    mineral oil-hydrophilic petrolatum (AQUAPHOR) ointment Apply topically twice daily dispense 14 ounce jar (Patient taking differently: Apply topically once a week ) 396 g 5    blood glucose test strips (ONETOUCH ULTRA) strip 1 each by Other route 3 times daily As needed. 300 each 1    Insulin Syringe-Needle U-100 (ULTICARE INSULIN SYRINGE) 31G X 5/16\" 0.3 ML MISC 1 each by Other route 5 times daily 100 each 3    Handicap Placard MISC by Does not apply route Patient cannot walk 200 ft without stopping to rest.    Expiration 10/21/2024 1 each 0    gabapentin (NEURONTIN) 800 MG tablet Take 1 tablet by mouth 4 times daily for 30 days. 120 tablet 5     No current facility-administered medications on file prior to encounter.        REVIEW OF SYSTEMS See HPI    Objective:    /76   Pulse 80   Temp 97.7 °F (36.5 °C) (Temporal)   Resp 18   Wt Readings from Last 3 Encounters:   06/02/21 245 lb (111.1 kg)   05/27/21 245 lb (111.1 kg)   05/12/21 245 lb (111.1 kg)     PHYSICAL EXAM  CONSTITUTIONAL:   Awake, alert, cooperative   EYES:  lids and lashes normal   ENT: external ears and nose without lesions   NECK:  supple, symmetrical, trachea midline   SKIN:  Open wound Present    Assessment:     Problem List Items Addressed This Visit     Disruption of closure of muscle or muscle flap, sequela - Primary (Chronic)    Relevant Orders    Initiate Outpatient Wound Care Protocol    Ischemic ulcer of right thigh with fat layer exposed [facility])    Relevant Orders    Initiate Outpatient Wound Care Protocol          Pre Debridement Measurements:  Are located in the [place]  Documentation Flow Sheet  Post Debridement Measurements:  Wound/Ulcer Descriptions are Pre Debridement except measurements:     Wound 06/02/21 Hip Right #2 (Active)   Wound Image   06/02/21 1124   Wound Etiology Surgical 06/02/21 1124   Dressing Status New dressing applied 06/02/21 1152   Wound Cleansed Cleansed with saline 06/09/21 1702   Dressing/Treatment Alginate;ABD 06/09/21 1702   Offloading for Diabetic Foot Ulcers No offloading required 06/02/21 1152   Wound Length (cm) 0.5 cm 06/23/21 1133   Wound Width (cm) 0.8 cm 06/23/21 1133   Wound Depth (cm) 1.8 cm 06/23/21 1133   Wound Surface Area (cm^2) 0.4 cm^2 06/23/21 1133   Change in Wound Size % (l*w) -2.56 06/23/21 1133   Wound Volume (cm^3) 0.72 cm^3 06/23/21 1133   Wound Healing % -16 06/23/21 1133   Post-Procedure Length (cm) 0.5 cm 06/23/21 1153   Post-Procedure Width (cm) 0.8 cm 06/23/21 1153   Post-Procedure Depth (cm) 1.8 cm 06/23/21 1153   Post-Procedure Surface Area (cm^2) 0.4 cm^2 06/23/21 1153   Post-Procedure Volume (cm^3) 0.72 cm^3 06/23/21 1153   Distance Tunneling (cm) 4 cm 06/23/21 1153   Tunneling Position ___ O'Clock 12 06/23/21 1153   Undermining Starts ___ O'Clock 12 06/02/21 1124   Undermining Ends___ O'Clock 12 06/02/21 1124   Undermining Maxium Distance (cm) [email or web address] 06/02/21 1124   Wound Assessment Pink/red;Fibrin 06/09/21 1205   Drainage Amount Moderate 06/23/21 1133   Drainage Description Brown;Serosanguinous; Thick 06/23/21 1133   Odor None 06/23/21 1133   Nadia-wound Assessment Fragile 06/23/21 1133   Number of days: 21         Procedure Note  Indications:  Based on my examination of this patient's wound(s)/ulcer(s) today, debridement is required to promote healing and evaluate the wound base. Performed by: Estela Ag MD    Consent obtained:  Yes    Time out taken:  Yes    Pain Control: Anesthetic  Anesthetic: 4% Lidocaine Liquid Topical     Debridement:Excisional Debridement    Using curette the wound(s)/ulcer(s) was/were sharply debrided down through and including the removal of epidermis, dermis and subcutaneous tissue. Devitalized Tissue Debrided:  fibrin, biofilm, slough and exudate to stimulate bleeding to promote healing, post debridement good bleeding base and wound edges noted    Wound/Ulcer #: 1    Percent of Wound/Ulcer Debrided: 100%    Total Surface Area Debrided:  0.4 sq cm     Estimated Blood Loss:  Minimal  Hemostasis Achieved:  by pressure    Procedural Pain:  7  / 10   Post Procedural Pain:  6 / 10     Response to treatment:  With complaints of pain    Plan:   Treatment Note please see attached Discharge Instructions    Written patient dismissal instructions given to patient and signed by patient or POA.          Discharge Instructions         Discharge Instructions                    Visit Discharge/Physician Orders     Discharge condition: Stable     Assessment of pain at discharge:yes     Anesthetic used: 4% lidocaine soln.     Discharge to: Home     Left via:Private automobile     Accompanied by: spouse     ECF/HHA: [address] [facility](note tunneling at 12 of 4cm's)     Dressing Orders:RIGHT AKA SITE-Cleanse with normal saline, pack loosely with calcium alginate, dry dressing and secure. Change daily.     Treatment Orders:Eat a diet high in protein and vitamin C. Take a multiple vitamin daily unless contraindicated.      Stop smoking!     [address] [place] [address],[address] followup visit: 2 weeks _____________________________  (Please note your next appointment above and if you are unable to keep, kindly give a 24 hour notice. Thank you.)     Physician signature:__________________________  Christyne Nageotte  If you experience any of the following, please call the [email or web address] during business hours:     * Increase in Pain  * Temperature over 101  * Increase in drainage from your wound  * Drainage with a foul odor  * Bleeding  * Increase in swelling  * Need for compression bandage changes due to slippage, breakthrough drainage.     If you need medical attention outside of the business hours of the [email or web address] please contact your PCP or go to the nearest emergency room.           Electronically signed by Severa Hatch, MD

## 2021-06-24 DIAGNOSIS — T81.49XA POSTOPERATIVE WOUND INFECTION: ICD-10-CM

## 2021-06-24 DIAGNOSIS — T87.9 AKA STUMP COMPLICATION (HCC): ICD-10-CM

## 2021-06-24 DIAGNOSIS — G89.4 CHRONIC PAIN SYNDROME: ICD-10-CM

## 2021-06-24 RX ORDER — BLOOD SUGAR DIAGNOSTIC
1 STRIP MISCELLANEOUS
Qty: 100 EACH | Refills: 3 | Status: SHIPPED
Start: 2021-06-24 | End: 2022-04-20 | Stop reason: SDUPTHER

## 2021-06-24 RX ORDER — OXYCODONE AND ACETAMINOPHEN 7.5; 325 MG/1; MG/1
1 TABLET ORAL EVERY 6 HOURS PRN
Qty: 120 TABLET | Refills: 0 | Status: SHIPPED
Start: 2021-06-24 | End: 2021-07-26 | Stop reason: SDUPTHER

## 2021-06-24 RX ORDER — OXYCODONE HYDROCHLORIDE 15 MG/1
1 TABLET, FILM COATED, EXTENDED RELEASE ORAL EVERY 12 HOURS
Qty: 60 TABLET | Refills: 0 | Status: SHIPPED | OUTPATIENT
Start: 2021-06-24 | End: 2021-07-24

## 2021-07-01 LAB
ALBUMIN SERPL-MCNC: 3.7 G/DL (ref 3.5–5.2)
ALP BLD-CCNC: 113 U/L (ref 40–129)
ALT SERPL-CCNC: 10 U/L (ref 0–40)
ANION GAP SERPL CALCULATED.3IONS-SCNC: 10 MMOL/L (ref 7–16)
AST SERPL-CCNC: 12 U/L (ref 0–39)
BASOPHILS ABSOLUTE: 0.05 E9/L (ref 0–0.2)
BASOPHILS RELATIVE PERCENT: 0.6 % (ref 0–2)
BILIRUB SERPL-MCNC: 0.2 MG/DL (ref 0–1.2)
BUN BLDV-MCNC: 19 MG/DL (ref 6–23)
CALCIUM SERPL-MCNC: 9.1 MG/DL (ref 8.6–10.2)
CHLORIDE BLD-SCNC: 96 MMOL/L (ref 98–107)
CO2: 25 MMOL/L (ref 22–29)
CREAT SERPL-MCNC: 1.7 MG/DL (ref 0.7–1.2)
EOSINOPHILS ABSOLUTE: 0.21 E9/L (ref 0.05–0.5)
EOSINOPHILS RELATIVE PERCENT: 2.5 % (ref 0–6)
GFR AFRICAN AMERICAN: 49
GFR NON-AFRICAN AMERICAN: 49 ML/MIN/1.73
GLUCOSE BLD-MCNC: 339 MG/DL (ref 74–99)
HCT VFR BLD CALC: 36.6 % (ref 37–54)
HEMOGLOBIN: 11.5 G/DL (ref 12.5–16.5)
IMMATURE GRANULOCYTES #: 0.05 E9/L
IMMATURE GRANULOCYTES %: 0.6 % (ref 0–5)
LYMPHOCYTES ABSOLUTE: 2.27 E9/L (ref 1.5–4)
LYMPHOCYTES RELATIVE PERCENT: 27.2 % (ref 20–42)
MCH RBC QN AUTO: 25.6 PG (ref 26–35)
MCHC RBC AUTO-ENTMCNC: 31.4 % (ref 32–34.5)
MCV RBC AUTO: 81.5 FL (ref 80–99.9)
MONOCYTES ABSOLUTE: 0.56 E9/L (ref 0.1–0.95)
MONOCYTES RELATIVE PERCENT: 6.7 % (ref 2–12)
NEUTROPHILS ABSOLUTE: 5.2 E9/L (ref 1.8–7.3)
NEUTROPHILS RELATIVE PERCENT: 62.4 % (ref 43–80)
PDW BLD-RTO: 15.9 FL (ref 11.5–15)
PLATELET # BLD: 207 E9/L (ref 130–450)
PMV BLD AUTO: 11.8 FL (ref 7–12)
POTASSIUM SERPL-SCNC: 4.1 MMOL/L (ref 3.5–5)
RBC # BLD: 4.49 E12/L (ref 3.8–5.8)
SODIUM BLD-SCNC: 131 MMOL/L (ref 132–146)
TOTAL CK: 397 U/L (ref 20–200)
TOTAL PROTEIN: 7.7 G/DL (ref 6.4–8.3)
WBC # BLD: 8.3 E9/L (ref 4.5–11.5)

## 2021-07-07 ENCOUNTER — HOSPITAL ENCOUNTER (OUTPATIENT)
Dept: WOUND CARE | Age: 66
Discharge: HOME | End: 2021-07-07
Payer: COMMERCIAL

## 2021-07-07 VITALS
HEART RATE: 82 BPM | RESPIRATION RATE: 18 BRPM | WEIGHT: 245 LBS | DIASTOLIC BLOOD PRESSURE: 60 MMHG | BODY MASS INDEX: 31.44 KG/M2 | SYSTOLIC BLOOD PRESSURE: 140 MMHG | HEIGHT: 74 IN | TEMPERATURE: 98.1 F

## 2021-07-07 DIAGNOSIS — L97.112: ICD-10-CM

## 2021-07-07 DIAGNOSIS — T81.32XS: Primary | ICD-10-CM

## 2021-07-07 PROCEDURE — 11042 DBRDMT SUBQ TIS 1ST 20SQCM/<: CPT | Performed by: SURGERY

## 2021-07-07 PROCEDURE — 6370000000 HC RX 637 (ALT 250 FOR IP): Performed by: SURGERY

## 2021-07-07 PROCEDURE — 11042 DBRDMT SUBQ TIS 1ST 20SQCM/<: CPT

## 2021-07-07 RX ORDER — LIDOCAINE 40 MG/G
CREAM TOPICAL ONCE
Status: CANCELLED | OUTPATIENT
Start: 2021-07-07 | End: 2021-07-07

## 2021-07-07 RX ORDER — LIDOCAINE 50 MG/G
OINTMENT TOPICAL ONCE
Status: CANCELLED | OUTPATIENT
Start: 2021-07-07 | End: 2021-07-07

## 2021-07-07 RX ORDER — GINSENG 100 MG
CAPSULE ORAL ONCE
Status: CANCELLED | OUTPATIENT
Start: 2021-07-07 | End: 2021-07-07

## 2021-07-07 RX ORDER — BACITRACIN ZINC AND POLYMYXIN B SULFATE 500; 1000 [USP'U]/G; [USP'U]/G
OINTMENT TOPICAL ONCE
Status: CANCELLED | OUTPATIENT
Start: 2021-07-07 | End: 2021-07-07

## 2021-07-07 RX ORDER — CLOBETASOL PROPIONATE 0.5 MG/G
OINTMENT TOPICAL ONCE
Status: CANCELLED | OUTPATIENT
Start: 2021-07-07 | End: 2021-07-07

## 2021-07-07 RX ORDER — LIDOCAINE HYDROCHLORIDE 20 MG/ML
JELLY TOPICAL ONCE
Status: CANCELLED | OUTPATIENT
Start: 2021-07-07 | End: 2021-07-07

## 2021-07-07 RX ORDER — BETAMETHASONE DIPROPIONATE 0.05 %
OINTMENT (GRAM) TOPICAL ONCE
Status: CANCELLED | OUTPATIENT
Start: 2021-07-07 | End: 2021-07-07

## 2021-07-07 RX ORDER — LIDOCAINE HYDROCHLORIDE 40 MG/ML
SOLUTION TOPICAL ONCE
Status: COMPLETED | OUTPATIENT
Start: 2021-07-07 | End: 2021-07-07

## 2021-07-07 RX ORDER — BACITRACIN, NEOMYCIN, POLYMYXIN B 400; 3.5; 5 [USP'U]/G; MG/G; [USP'U]/G
OINTMENT TOPICAL ONCE
Status: CANCELLED | OUTPATIENT
Start: 2021-07-07 | End: 2021-07-07

## 2021-07-07 RX ORDER — GENTAMICIN SULFATE 1 MG/G
OINTMENT TOPICAL ONCE
Status: CANCELLED | OUTPATIENT
Start: 2021-07-07 | End: 2021-07-07

## 2021-07-07 RX ORDER — LIDOCAINE HYDROCHLORIDE 40 MG/ML
SOLUTION TOPICAL ONCE
Status: CANCELLED | OUTPATIENT
Start: 2021-07-07 | End: 2021-07-07

## 2021-07-07 RX ADMIN — LIDOCAINE HYDROCHLORIDE 3 ML: 40 SOLUTION TOPICAL at 11:58

## 2021-07-07 NOTE — PROGRESS NOTES
Wound Healing Center Followup Visit Note    Referring Physician : Nohelia Norris Str RECORD NUMBER:  [id number]  AGE: 63 y.o. GENDER: male  : 1955  EPISODE DATE:  2021    Subjective:     Chief Complaint   Patient presents with    Wound Check     right leg amp site wound      HISTORY of PRESENT ILLNESS HPI   Daya Gonzalez is a 63 y.o. male who presents today in regards to follow up evaluation and treatment of wound/ulcer. That patient's past medical, family and social hx were reviewed and changes were made if present. History of Wound Context:  Pt presents in regards to chronic R above knee amputation wound was since 2020. He had developed postoperatively dehiscence of his wound site which had previously undergone a muscle flap. He was having a packed per home health care. The wound had gotten so small that it was difficult to pack. He is still having drainage significant though. At first visit to wound healing center 20 his wound has not been improving. He was not on abx at initial visit to center.     Previous lower extremity procedures  2018  R AKA for chronic R LE wound   3/20/2020 R groin exploration with iliofemoral artery embolectomy, primary closure of arteriotomy, R deep femoral atery embolectomy   2020 Excisional debridement of infected necrotic  tissue, right groin and right thigh   2020 RIGHT THIGH WOUND DRESSING CHANGE and DEBRIDEMENT   2020 Irrigation and excisional debridement of Right Thigh above knee amputation wound   2020 Irrigation and excisional debridement of the Right thigh above knee amputation wound 25 x45 cm  Excision of prosthetic R LE bypass graft  Ligation of right SFA  Posterior hamstring myocutaneous flap 65 cm flap for open wound of 45 x 25 cm per Dr. Mariama Adhikari     20  · Wound itself is small - opened up as difficult to pack  · 5 cm depth - culture done, significant amount of fluid expressed once opened up  · aquacell ag packing  · Discussed potential need for further surgical exploration in future - pt would like to avoid hospital and OR if possible  9/2/20  · Wound much improved - depth less  · Start doxycyline for staph culture  9/9/20  · Depth 5 cm again, fluid seems less  9/23/20  · Tunneling now 8 cm, concern that hittig bone  · Discussed with patient continuing current reigmen unlikely to result in healing  · Would benefit from further surgical debridement, likely revision of amputation site and possible wound vac  · Patient would like to consider options and will let me know next week  9/30/20  · Significant tunneling still  · Will proceed with or in 2 weeks per pt request  10/21/20  · OR 10/14/20  · dakins moistened kerlex  · Discussed with Dr. Nila Reeves re pain control issues  · Doxycycline 10 days script given  10/28/20  · Pain better controlled  · + Granulation tissue  · Bone less exposed  11/4/20  · Wound health  · Bone still exposed  11/18/20  · Wound continues to be improved  · Less bone exposed  11/25/20  · Bone exposure still an issue  · Will take for surgery - try dermagraft  · If not successful will likely need revision in future  12/9/20  · Bone exposure, wound overall improvd  · Patient did not follow though with testing for surgery  12/17/20  · dermagraft application  47/72/47  · Wound improved, bone still exposed  1/6/21  · Wound improved, bone still exposed  1/13/21  · Bone almost completely covered  1/27/21  · Bone covered, wound improved  2/10/21  · wound improved,depth less  2/24/21  · Wound stable - 3.5 cm depth  3/10/21  · Wound stable 4 cm depth  3/24/21  · Wound stable, same depth not improving - discussed with patient concerning and may need further debridement  3/31/21  · Depth improved 3.5 cm  4/14/21  · Depth improved 3 cm   4/21/21  · Depth improved 2.8 cm  4/28/21  · Depth stable  5/12/21  · Increased pain  · Suspect wound has closed down to small and needs opened up as fluid is being trapped  · Will admit through er  · Or planned for tomorrow  5/12-20/21  · OR debridement, resection of more femur 5/14/21, Debridement and closure 5/18/21 6/2/21  · Most of wound is closed  · Small area laterally that is open and has ~ 2 cm depth - pack with aquacell  6/9/21  · Sutures removed  · Lateral wound 4 cm dept - pack   6/23/21  · Lateral wound stable 4 cm  · Patient states he does want any more aggressive intervention - not much to offer him at this point  · We did discuss importance of keeping wound clean and infection free  · Will continue with dressing changes  · Having a lot of issues phantom pain - on neurontin 800 mg qid  7/7/21  · Overall feeling better  · Depth decreased to 3.5 cm    Wound/Ulcer Pain Timing/Severity:  moderate  Quality of pain: aching, throbbing, shooting   Severity:  5 / 10   Modifying Factors: Pain worsens with dressing changes, pressure, debridement  Associated Signs/Symptoms: drainage and pain    Ulcer Identification:  Ulcer Type: arterial, diabetic, pressure and non-healing surgical  Contributing Factors: edema, diabetes, poor glucose control, chronic pressure, decreased mobility, shear force, obesity and arterial insufficiency    Diabetic/Pressure/Non Pressure Ulcers only:  Ulcer: Diabetic ulcer, fat layer exposed    Wound: Wound dehiscence        PAST MEDICAL HISTORY      Diagnosis Date    Acute kidney injury ([address] 5/18/2020    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 ([address] 5/22/2018    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 ([address] 5/21/2018    Cellulitis, scrotum 3/20/2020    Critical lower limb ischemia ([address] 3/20/2020    Diabetes mellitus ([address]     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 2 diabetes mellitus, with fat layer exposed ([address] 5/22/2018    Disruption of closure of muscle or muscle flap, sequela 8/26/2020    DVT, lower extremity (HCC)     right leg     Encounter for dental examination and cleaning with abnormal findings 4/2/2018    Gas gangrene of thigh ([facility] 3/20/2020    History of DVT (deep vein thrombosis) 7/31/2018    Hyperglycemia due to type 2 diabetes mellitus ([facility] 3/20/2020    Hyperlipidemia     Hypertension     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[facility]     Osteomyelitis ([facility] 10/24/2018    PVD (peripheral vascular disease) ([facility]      Past Surgical History:   Procedure Laterality Date    AMPUTATION ABOVE KNEE Right 4/28/2020    DEBRIDEMENT OF AMPUTATION RIGHT ABOVE KNEE performed by Glenda Lopez MD at [facility] Right 4/30/2020    DEBRIDEMENT OF AMPUTATION RIGHT ABOVE KNEE,  POSS. ABOVE KNEE REVISION, POSS. CLOSURE, POSS.WOUND VAC -- BEN Marin / Sparkle [place] TO LOOK IN performed by Glenda Lopez MD at [place] [address] Right 3/20/2020    RIGHT LOWER EXTREMITY THROMBECTOMY, POSSIBLE ANGIOGRAM, POSSIBLE INTERVENTION, POSSIBLE BYPASS. performed by Glenda Lopez MD at [address] [place] [address] Right 4/17/2020    RIGHT LEG DEBRIDEMENT INCISION AND DRAINAGE performed by Timo Matt MD at [address] [place] [address] Right 4/20/2020    RIGHT THIGH WOUND DRESSING CHANGE; DEBRIDEMENT, removal of muscle performed by Timo Matt MD at [address] [address] Right 4/21/2020    RIGHT THIGH WOUND DRESSING CHANGE POSSIBLE  DEBRIDEMENT - NEEDS BEN Marin / JANETTE RON SEE performed by Babita Lopez MD at [address] Right 4/23/2020    RIGHT THIGH WOUND DRESSING CHANGE and DEBRIDEMENT performed by Glenda Lopez MD at [address] [place] [address] Right 10/15/2020    DEBRIDEMENT RIGHT ABOVE KNEE AMPUTATION POSS. REVISION POSS. WOUND VAC performed by Glenda Lopez MD at [address] Right 12/17/2020    DEBRIDEMENT  RIGHT ABOVE KNEE AMPUTATION WITH POSS.  ADVANCED SKIN THERAPY performed by Glenda Lopez MD at [facility] OR    LEG SURGERY Right 5/14/2021    DEBRIDEMENT RIGHT ABOVE KNEE AMPUTATION [facility]ade [facility] Right 5/18/2021    DEBRIDEMENT ABOVE THE KNEE AMPUTATION , WITH WOUND VAC APPLICATION performed by Jose Eduardo Newton MD at [address] Right 11/1/2018    AMPUTATION ABOVE KNEE RIGHT LEG performed by Jose Eduardo Newton MD at [facility] Right 5/24/2018    RIGHT FOOT INCISION AND DRAINAGE WITH PARTIAL BONE RESECTION performed by Lazaro Victor DPM at [facility] OFFICE/OUTPT VISIT,PROCEDURE ONLY Right 8/3/2018    INCISION AND DRAINAGE MULTIPLE AREAS RIGHT FOOT WITH DEBRIDEMENT SOFT TISSUE performed by Lazaro Victor DPM at [facility] OR    RHINOPLASTY Right     leg      Family History   Problem Relation Age of Onset    Cancer Mother     Diabetes Father     Heart Failure Father     Hypertension Father     Cancer Sister      Social History     Tobacco Use    Smoking status: Current Every Day Smoker     Packs/day: 0.50     Years: 7.00     Pack years: 3.50     Types: Cigarettes    Smokeless tobacco: Never Used   Vaping Use    Vaping Use: Never used   Substance Use Topics    Alcohol use: No    Drug use: No     No Known Allergies  Current Outpatient Medications on File Prior to Encounter   Medication Sig Dispense Refill    oxyCODONE (OXYCONTIN) 15 MG T12A extended release tablet Take 1 tablet by mouth every 12 hours for 30 days. 60 tablet 0    Insulin Syringe-Needle U-100 ([employer]ICARE INSULIN SYRINGE) 31G X 5/16\" 0.3 ML MISC 1 each by Other route 5 times daily 100 each 3    metoprolol tartrate (LOPRESSOR) 25 MG tablet TAKE ONE TABLET BY MOUTH TWO TIMES DAILY 90 tablet 1    hydrALAZINE (APRESOLINE) 25 MG tablet TAKE 1 TABLET BY MOUTH 3 TIMES DAILY.  90 tablet 3    insulin glargine (LANTUS SOLOSTAR) 100 UNIT/ML injection pen Inject 25 Units into the skin nightly 5 pen 3    Insulin Pen Needle 29G X 12MM MISC 1 each by Does not apply route daily 100 each 3    lactobacillus (CULTURELLE) CAPS capsule Take 2 capsules by mouth daily 60 capsule 1    DULoxetine (CYMBALTA) 60 MG extended release capsule TAKE TWO CAPSULES BY MOUTH EVERY MORNING 180 capsule 1    chlorthalidone (HYGROTON) 25 MG tablet TAKE 1 TABLET BY MOUTH ONE TIME A DAY 30 tablet 3    mirtazapine (REMERON) 7.5 MG tablet TAKE 1 TABLET BY MOUTH ONE TIME A DAY NIGHTLY 90 tablet 1    insulin lispro (HUMALOG) 100 UNIT/ML injection vial Inject 15 Units into the skin 3 times daily (before meals) (Patient taking differently: Inject 12 Units into the skin 3 times daily (before meals) For every 20 units above 200 mg/dL patient takes an additional 2 units) 15 vial 3    ammonium lactate (LAC-HYDRIN) 12 % lotion Apply topically as needed. 1 Bottle 0    amLODIPine (NORVASC) 10 MG tablet Take 1 tablet by mouth daily 90 tablet 1    cloNIDine (CATAPRES) 0.1 MG tablet TAKE ONE TABLET BY MOUTH THREE TIMES DAILY 270 tablet 3    cilostazol (PLETAL) 50 MG tablet Take 1 tablet by mouth 2 times daily 180 tablet 1    mineral oil-hydrophilic petrolatum (AQUAPHOR) ointment Apply topically twice daily dispense 14 ounce jar (Patient taking differently: Apply topically once a week ) 396 g 5    blood glucose test strips (ONETOUCH ULTRA) strip 1 each by Other route 3 times daily As needed. 300 each 1    Handicap Placard MISC by Does not apply route Patient cannot walk 200 ft without stopping to rest.    Expiration 10/21/2024 1 each 0    doxycycline hyclate (VIBRAMYCIN) 100 MG capsule Take 1 capsule by mouth 2 times daily 60 capsule 2    oxyCODONE-acetaminophen (PERCOCET) 7.5-325 MG per tablet Take 1 tablet by mouth every 6 hours as needed for Pain for up to 30 days. Intended supply: 30 days 120 tablet 0    gabapentin (NEURONTIN) 800 MG tablet Take 1 tablet by mouth 4 times daily for 30 days.  120 tablet 5     No current facility-administered medications on file prior to encounter. REVIEW OF SYSTEMS See HPI    Objective:    BP (!) 140/60   Pulse 82   Temp 98.1 °F (36.7 °C) (Temporal)   Resp 18   Ht 6' 2\" (1.88 m)   Wt 245 lb (111.1 kg)   BMI 31.46 kg/m²   Wt Readings from Last 3 Encounters:   07/07/21 245 lb (111.1 kg)   06/02/21 245 lb (111.1 kg)   05/27/21 245 lb (111.1 kg)     PHYSICAL EXAM  CONSTITUTIONAL:   Awake, alert, cooperative   EYES:  lids and lashes normal   ENT: external ears and nose without lesions   NECK:  supple, symmetrical, trachea midline   SKIN:  Open wound Present    Assessment:     Problem List Items Addressed This Visit     Disruption of closure of muscle or muscle flap, sequela - Primary (Chronic)    Relevant Orders    Initiate Outpatient Wound Care Protocol    Ischemic ulcer of right thigh with fat layer exposed [facility])    Relevant Orders    Initiate Outpatient Wound Care Protocol          Pre Debridement Measurements:  Are located in the [place]  Documentation Flow Sheet  Post Debridement Measurements:  Wound/Ulcer Descriptions are Pre Debridement except measurements:     Wound 06/02/21 Hip Right #2 (Active)   Wound Image   07/07/21 1150   Wound Etiology Surgical 06/02/21 1124   Dressing Status New dressing applied;Clean;Dry; Intact 06/23/21 1210   Wound Cleansed Cleansed with saline 06/23/21 1210   Dressing/Treatment Alginate 06/23/21 1210   Offloading for Diabetic Foot Ulcers Other (comment) 06/23/21 1210   Wound Length (cm) 0.3 cm 07/07/21 1150   Wound Width (cm) 0.5 cm 07/07/21 1150   Wound Depth (cm) 3.3 cm 07/07/21 1150   Wound Surface Area (cm^2) 0.15 cm^2 07/07/21 1150   Change in Wound Size % (l*w) 61.54 07/07/21 1150   Wound Volume (cm^3) 0.495 cm^3 07/07/21 1150   Wound Healing % 20 07/07/21 1150   Post-Procedure Length (cm) 0.4 cm 07/07/21 1240   Post-Procedure Width (cm) 0.5 cm 07/07/21 1240   Post-Procedure Depth (cm) 3.3 cm 07/07/21 1240   Post-Procedure Surface Area (cm^2) 0.2 cm^2 07/07/21 1240   Post-Procedure Volume (cm^3) 0.66 cm^3 07/07/21 1240   Distance Tunneling (cm) 4 cm 06/23/21 1153   Tunneling Position ___ O'Clock 12 06/23/21 1153   Undermining Starts ___ O'Clock 12 06/02/21 1124   Undermining Ends___ O'Clock 12 06/02/21 1124   Undermining Maxium Distance (cm) [email or web address] 06/02/21 1124   Wound Assessment Pink/red;Fibrin 06/09/21 1205   Drainage Amount Small 07/07/21 1150   Drainage Description Serosanguinous; Yellow 07/07/21 1150   Odor None 07/07/21 1150   Nadia-wound Assessment Intact 07/07/21 1150   Number of days: 35         Procedure Note  Indications:  Based on my examination of this patient's wound(s)/ulcer(s) today, debridement is required to promote healing and evaluate the wound base. Performed by: Gray Carranza MD    Consent obtained:  Yes    Time out taken:  Yes    Pain Control: Anesthetic  Anesthetic: 4% Lidocaine Liquid Topical     Debridement:Excisional Debridement    Using curette the wound(s)/ulcer(s) was/were sharply debrided down through and including the removal of epidermis, dermis and subcutaneous tissue. Devitalized Tissue Debrided:  fibrin, biofilm, slough and exudate to stimulate bleeding to promote healing, post debridement good bleeding base and wound edges noted    Wound/Ulcer #: 1    Percent of Wound/Ulcer Debrided: 100%    Total Surface Area Debrided:  0.15 sq cm     Estimated Blood Loss:  Minimal  Hemostasis Achieved:  by pressure    Procedural Pain:  6  / 10   Post Procedural Pain:  5 / 10     Response to treatment:  Well tolerated    Plan:   Treatment Note please see attached Discharge Instructions    Written patient dismissal instructions given to patient and signed by patient or POA.          Discharge Instructions       Discharge Instructions                  Visit Discharge/Physician Orders     Discharge condition: Stable     Assessment of pain at discharge:yes     Anesthetic used: 4% lidocaine soln.     Discharge to: Home     Left via:Private automobile     Accompanied by: spouse     ECF/HHA: [facility](note tunneling at 15 of 4cm's)     Dressing Orders:RIGHT AKA SITE-Cleanse with normal saline, pack loosely with calcium alginate, dry dressing and secure. Change daily.     Treatment Orders:Eat a diet high in protein and vitamin C. Take a multiple vitamin daily unless contraindicated.      Stop smoking!     [address] [place] [address],[address] followup visit: 2 weeks _____________________________  (Please note your next appointment above and if you are unable to keep, kindly give a 24 hour notice.  Thank you.)     Physician signature:__________________________  Uriah Benites  If you experience any of the following, please call the [facility] during business hours:     * Increase in Pain  * Temperature over 101  * Increase in drainage from your wound  * Drainage with a foul odor  * Bleeding  * Increase in swelling  * Need for compression bandage changes due to slippage, breakthrough drainage.     If you need medical attention outside of the business hours of the [facility] please contact your PCP or go to the nearest emergency room.                    Electronically signed by Tere Bunch MD

## 2021-07-08 ENCOUNTER — OFFICE VISIT (OUTPATIENT)
Dept: PRIMARY CARE CLINIC | Age: 66
End: 2021-07-08
Payer: COMMERCIAL

## 2021-07-08 VITALS
SYSTOLIC BLOOD PRESSURE: 138 MMHG | OXYGEN SATURATION: 98 % | HEART RATE: 83 BPM | DIASTOLIC BLOOD PRESSURE: 74 MMHG | WEIGHT: 245 LBS | TEMPERATURE: 98.4 F | BODY MASS INDEX: 31.44 KG/M2 | HEIGHT: 74 IN

## 2021-07-08 DIAGNOSIS — I73.9 PVD (PERIPHERAL VASCULAR DISEASE) (HCC): ICD-10-CM

## 2021-07-08 DIAGNOSIS — T87.9 AKA STUMP COMPLICATION (HCC): ICD-10-CM

## 2021-07-08 DIAGNOSIS — F17.200 TOBACCO DEPENDENCE: ICD-10-CM

## 2021-07-08 DIAGNOSIS — I99.8 VASCULAR OCCLUSION: ICD-10-CM

## 2021-07-08 DIAGNOSIS — Z89.611 ACQUIRED ABSENCE OF RIGHT LEG ABOVE KNEE (HCC): ICD-10-CM

## 2021-07-08 DIAGNOSIS — N18.30 STAGE 3 CHRONIC KIDNEY DISEASE, UNSPECIFIED WHETHER STAGE 3A OR 3B CKD (HCC): ICD-10-CM

## 2021-07-08 DIAGNOSIS — I10 HYPERTENSION, UNSPECIFIED TYPE: ICD-10-CM

## 2021-07-08 DIAGNOSIS — G89.4 CHRONIC PAIN SYNDROME: ICD-10-CM

## 2021-07-08 DIAGNOSIS — E11.65 UNCONTROLLED TYPE 2 DIABETES MELLITUS WITH HYPERGLYCEMIA (HCC): Primary | ICD-10-CM

## 2021-07-08 DIAGNOSIS — G47.33 OBSTRUCTIVE SLEEP APNEA (ADULT) (PEDIATRIC): ICD-10-CM

## 2021-07-08 PROCEDURE — G8427 DOCREV CUR MEDS BY ELIG CLIN: HCPCS | Performed by: FAMILY MEDICINE

## 2021-07-08 PROCEDURE — 99213 OFFICE O/P EST LOW 20 MIN: CPT | Performed by: FAMILY MEDICINE

## 2021-07-08 PROCEDURE — 2022F DILAT RTA XM EVC RTNOPTHY: CPT | Performed by: FAMILY MEDICINE

## 2021-07-08 PROCEDURE — G8417 CALC BMI ABV UP PARAM F/U: HCPCS | Performed by: FAMILY MEDICINE

## 2021-07-08 PROCEDURE — 4004F PT TOBACCO SCREEN RCVD TLK: CPT | Performed by: FAMILY MEDICINE

## 2021-07-08 PROCEDURE — 3046F HEMOGLOBIN A1C LEVEL >9.0%: CPT | Performed by: FAMILY MEDICINE

## 2021-07-08 PROCEDURE — 3017F COLORECTAL CA SCREEN DOC REV: CPT | Performed by: FAMILY MEDICINE

## 2021-07-08 RX ORDER — OXYCODONE HCL 20 MG/1
20 TABLET, FILM COATED, EXTENDED RELEASE ORAL 2 TIMES DAILY
Qty: 60 TABLET | Refills: 0 | Status: SHIPPED
Start: 2021-07-08 | End: 2021-08-06 | Stop reason: SDUPTHER

## 2021-07-08 RX ORDER — CILOSTAZOL 50 MG/1
50 TABLET ORAL 2 TIMES DAILY
Qty: 180 TABLET | Refills: 1 | Status: SHIPPED
Start: 2021-07-08 | End: 2021-07-30

## 2021-07-08 NOTE — PROGRESS NOTES
Panchito Mauricio (:  1955) is a 63 y.o. male,Established patient, here for evaluation of the following chief complaint(s):  1 Month Follow-Up (STATES HAD PICC LINE REMOVED)         ASSESSMENT/PLAN:  1. Chronic pain syndrome  -     oxyCODONE (OXYCONTIN) 20 MG extended release tablet; Take 1 tablet by mouth 2 times daily for 30 days. Intended supply: 30 days, Disp-60 tablet, R-0Normal  2. 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 Obstructive sleep apnea (adult) (pediatric)  4. AKA stump complication ([facility])  5. Vascular occlusion  6. Tobacco dependence  7. Stage 3 chronic kidney disease, unspecified whether stage 3a or 3b CKD ([facility] [address]  8. PVD (peripheral vascular disease) ([facility])  -     cilostazol (PLETAL) 50 MG tablet; Take 1 tablet by mouth 2 times daily, Disp-180 tablet, R-1Normal  9. Hypertension, unspecified type  10. Uncontrolled type 2 diabetes mellitus with hyperglycemia ([facility])  Increase his long-acting opiate medicine patient is having severe exacerbation of breakthrough of his pain. Continue to follow-up with wound care as directed. See him back in 3 months or sooner if needed. Return in about 3 months (around 10/8/2021). Subjective   SUBJECTIVE/OBJECTIVE:  HPI  Patient presents today for follow-up on chronic issues and for medication adjustment. Patient continues to follow with wound care due to continued right AKA stump infection. Patient recently had his PICC line removed and is currently on long-term suppressive therapy. He was told that he will most likely need to see plastic surgery and have another surgery in the near future. Patient refused at this time and does not wish to proceed with further surgery at this time. Patient states he is having significant exacerbations of his chronic pain. Has been taking all medications as prescribed without any side effect or adverse reaction. Review of Systems   Constitutional: Positive for fatigue.    HENT: Negative for hearing loss and nosebleeds. Eyes: Negative for photophobia. Respiratory: Positive for apnea. Negative for shortness of breath. Cardiovascular: Negative for palpitations and leg swelling. Gastrointestinal: Positive for constipation. Negative for blood in stool and diarrhea. Endocrine: Negative for polydipsia. Genitourinary: Negative for dysuria, frequency, hematuria and urgency. Musculoskeletal: Positive for arthralgias, gait problem, joint swelling and myalgias. Negative for back pain. Right Sided above-the-knee amputation revised multiple times until it is  just distal to the hip joint   Skin: Negative. Neurological: Positive for numbness. Negative for dizziness and tremors. Hematological: Does not bruise/bleed easily. Psychiatric/Behavioral: Positive for decreased concentration, dysphoric mood and sleep disturbance. Negative for hallucinations, self-injury and suicidal ideas. The patient is nervous/anxious. All other systems reviewed and are negative. Current Outpatient Medications:     cilostazol (PLETAL) 50 MG tablet, Take 1 tablet by mouth 2 times daily, Disp: 180 tablet, Rfl: 1    oxyCODONE (OXYCONTIN) 20 MG extended release tablet, Take 1 tablet by mouth 2 times daily for 30 days. Intended supply: 30 days, Disp: 60 tablet, Rfl: 0    doxycycline hyclate (VIBRAMYCIN) 100 MG capsule, Take 1 capsule by mouth 2 times daily, Disp: 60 capsule, Rfl: 2    oxyCODONE (OXYCONTIN) 15 MG T12A extended release tablet, Take 1 tablet by mouth every 12 hours for 30 days. , Disp: 60 tablet, Rfl: 0    oxyCODONE-acetaminophen (PERCOCET) 7.5-325 MG per tablet, Take 1 tablet by mouth every 6 hours as needed for Pain for up to 30 days.  Intended supply: 30 days, Disp: 120 tablet, Rfl: 0    Insulin Syringe-Needle U-100 (ULTICARE INSULIN SYRINGE) 31G X 5/16\" 0.3 ML MISC, 1 each by Other route 5 times daily, Disp: 100 each, Rfl: 3    metoprolol tartrate (LOPRESSOR) 25 MG tablet, TAKE ONE TABLET BY MOUTH TWO TIMES DAILY, Disp: 90 tablet, Rfl: 1    hydrALAZINE (APRESOLINE) 25 MG tablet, TAKE 1 TABLET BY MOUTH 3 TIMES DAILY. , Disp: 90 tablet, Rfl: 3    insulin glargine (LANTUS SOLOSTAR) 100 UNIT/ML injection pen, Inject 25 Units into the skin nightly, Disp: 5 pen, Rfl: 3    Insulin Pen Needle 29G X 12MM MISC, 1 each by Does not apply route daily, Disp: 100 each, Rfl: 3    lactobacillus (CULTURELLE) CAPS capsule, Take 2 capsules by mouth daily, Disp: 60 capsule, Rfl: 1    DULoxetine (CYMBALTA) 60 MG extended release capsule, TAKE TWO CAPSULES BY MOUTH EVERY MORNING, Disp: 180 capsule, Rfl: 1    chlorthalidone (HYGROTON) 25 MG tablet, TAKE 1 TABLET BY MOUTH ONE TIME A DAY, Disp: 30 tablet, Rfl: 3    mirtazapine (REMERON) 7.5 MG tablet, TAKE 1 TABLET BY MOUTH ONE TIME A DAY NIGHTLY, Disp: 90 tablet, Rfl: 1    insulin lispro (HUMALOG) 100 UNIT/ML injection vial, Inject 15 Units into the skin 3 times daily (before meals) (Patient taking differently: Inject 12 Units into the skin 3 times daily (before meals) For every 20 units above 200 mg/dL patient takes an additional 2 units), Disp: 15 vial, Rfl: 3    ammonium lactate (LAC-HYDRIN) 12 % lotion, Apply topically as needed. , Disp: 1 Bottle, Rfl: 0    amLODIPine (NORVASC) 10 MG tablet, Take 1 tablet by mouth daily, Disp: 90 tablet, Rfl: 1    cloNIDine (CATAPRES) 0.1 MG tablet, TAKE ONE TABLET BY MOUTH THREE TIMES DAILY, Disp: 270 tablet, Rfl: 3    mineral oil-hydrophilic petrolatum (AQUAPHOR) ointment, Apply topically twice daily dispense 14 ounce jar (Patient taking differently: Apply topically once a week ), Disp: 396 g, Rfl: 5    blood glucose test strips (ONETOUCH ULTRA) strip, 1 each by Other route 3 times daily As needed. , Disp: 300 each, Rfl: 1    Handicap Placard MISC, by Does not apply route Patient cannot walk 200 ft without stopping to rest.   Expiration 10/21/2024, Disp: 1 each, Rfl: 0    gabapentin (NEURONTIN) 800 MG tablet, Take 1 tablet by mouth 4 times daily for 30 days. 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 ([facility])    Coagulopathy (HCC)    Moderate protein-calorie malnutrition (HCC)    PVD (peripheral vascular disease) ([facility])    Leukocytosis    Stage 3 chronic kidney disease (HCC)    Tobacco dependence    HLD (hyperlipidemia)    Osteomyelitis of right lower limb ([facility])    S/P AKA (above knee amputation), right ([facility])    History of vascular surgery    Occlusion of common femoral artery ([facility])    Critical lower limb ischemia ([address]    Vascular occlusion    Wound infection    Postoperative wound infection    Ischemic ulcer of right thigh with fat layer exposed ([address]    Ischemic ulcer of right thigh with necrosis of muscle ([address]    Above knee amputation of right lower extremity ([facility])    Postoperative pain    Disruption of closure of muscle or muscle flap, sequela    Chronic pain syndrome    AKA stump complication ([facility])    Elevated sedimentation rate    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[facility])    Major depressive disorder, single episode, unspecified    Muscle weakness (generalized)    Obstructive sleep apnea (adult) (pediatric)     Past Medical History:   Diagnosis Date    Acute kidney injury ([address] 5/18/2020    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 ([address] 5/22/2018    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 ([address] 5/21/2018    Cellulitis, scrotum 3/20/2020    Critical lower limb ischemia ([address] 3/20/2020    Diabetes mellitus ([address]     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 2 diabetes mellitus, with fat layer exposed ([address] 5/22/2018    Disruption of closure of muscle or muscle flap, sequela 8/26/2020    DVT, lower extremity ([facility])     right leg     [facility] OR    LEG SURGERY Right 5/14/2021    DEBRIDEMENT RIGHT ABOVE KNEE AMPUTATION performed by John Garcia MD at [address] [facility] [place] Right 5/18/2021    DEBRIDEMENT ABOVE THE KNEE AMPUTATION , WITH WOUND VAC APPLICATION performed by Callie Gallardo MD at [address] [facility] [address] Right 11/1/2018    AMPUTATION ABOVE KNEE RIGHT LEG performed by Callie Gallardo MD at [address] [facility] Right 5/24/2018    RIGHT FOOT INCISION AND DRAINAGE WITH PARTIAL BONE RESECTION performed by Carol Scott DPM at [address] [place] [address] OFFICE/OUTPT VISIT,PROCEDURE ONLY Right 8/3/2018    INCISION AND DRAINAGE MULTIPLE AREAS RIGHT FOOT WITH DEBRIDEMENT SOFT TISSUE performed by Carol Scott DPM at [place] [address] Right     leg      Social History     Socioeconomic History    Marital status: Single     Spouse name: Not on file    Number of children: Not on file    Years of education: Not on file    Highest education level: Not on file   Occupational History    Not on file   Tobacco Use    Smoking status: Current Every Day Smoker     Packs/day: 0.50     Years: 7.00     Pack years: 3.50     Types: Cigarettes    Smokeless tobacco: Never Used   Vaping Use    Vaping Use: Never used   Substance and Sexual Activity    Alcohol use: No    Drug use: No    Sexual activity: Not on file   Other Topics Concern    Not on file   Social History Narrative    Not on file     Social Determinants of Health     Financial Resource Strain:     Difficulty of Paying Living Expenses:    Food Insecurity:     Worried About Running Out of Food in the Last Year:     Ran Out of Food in the Last Year:    Transportation Needs:     Lack of Transportation (Medical):      Lack of Transportation (Non-Medical):    Physical Activity:     Days of Exercise per Week:     Minutes of Exercise per Session:    Stress:     Feeling of Stress :    Social Connections:     Frequency of Communication with Friends and Family:     Frequency of Social Gatherings with Friends and Family:     Attends Jainism Services:     Active Member of Clubs or Organizations:     Attends Club or Organization Meetings:     Marital Status:    Intimate Partner Violence:     Fear of Current or Ex-Partner:     Emotionally Abused:     Physically Abused:     Sexually Abused:      Family History   Problem Relation Age of Onset    Cancer Mother     Diabetes Father     Heart Failure Father     Hypertension Father     Cancer Sister       Health Maintenance Due   Topic Date Due    Colon cancer screen colonoscopy  Never done     There are no preventive care reminders to display for this patient. Diabetes Management   Topic Date Due    Lipid screen  05/23/2019    Diabetic microalbuminuria test  05/02/2021      Health Maintenance Due   Topic    Shingles Vaccine (1 of 2)      Health Maintenance   Topic Date Due    HIV screen  Never done    Colon cancer screen colonoscopy  Never done    Shingles Vaccine (1 of 2) Never done    Lipid screen  05/23/2019    Diabetic microalbuminuria test  05/02/2021    Annual Wellness Visit (AWV)  08/04/2021    Diabetic foot exam  12/21/2021 (Originally 10/24/2019)    DTaP/Tdap/Td vaccine (1 - Tdap) 12/21/2021 (Originally 12/13/1974)    A1C test (Diabetic or Prediabetic)  08/13/2021    Flu vaccine (1) 09/01/2021    Potassium monitoring  07/05/2022    Creatinine monitoring  07/05/2022    Diabetic retinal exam  03/30/2023    Pneumococcal 0-64 years Vaccine (2 of 2 - PPSV23) 01/01/2025    COVID-19 Vaccine  Completed    Hepatitis C screen  Completed    Hepatitis A vaccine  Aged Out    Hib vaccine  Aged Out    Meningococcal (ACWY) vaccine  Aged Out      There are no preventive care reminders to display for this patient. There are no preventive care reminders to display for this patient.          /74   Pulse 83   Temp 98.4 °F (36.9 °C)   Ht 6' 2\" (1.88 m) Wt 245 lb (111.1 kg)   SpO2 98%   BMI 31.46 kg/m²       Objective   Physical Exam  Vitals reviewed. Constitutional:       Appearance: He is obese. HENT:      Head: Normocephalic and atraumatic. Mouth/Throat:      Dentition: Abnormal dentition (edentulous upper). Eyes:      General: No scleral icterus. Conjunctiva/sclera: Conjunctivae normal.      Pupils: Pupils are equal, round, and reactive to light. Neck:      Thyroid: No thyromegaly. Cardiovascular:      Rate and Rhythm: Normal rate and regular rhythm. Heart sounds: Normal heart sounds. No murmur heard. Pulmonary:      Effort: Pulmonary effort is normal.      Breath sounds: Normal breath sounds. No rales. Abdominal:      General: Bowel sounds are decreased. There is no distension. Palpations: Abdomen is soft. Tenderness: There is no abdominal tenderness. Musculoskeletal:         General: Normal range of motion. Cervical back: Neck supple. Legs:       Comments: Left upper extremity PICC line in place. Lymphadenopathy:      Cervical: No cervical adenopathy. Skin:     General: Skin is warm and dry. Findings: No erythema or rash. Neurological:      Mental Status: He is alert and oriented to person, place, and time. Cranial Nerves: No cranial nerve deficit. Psychiatric:         Judgment: Judgment normal.                  An electronic signature was used to authenticate this note.     --Mariluz Kline DO

## 2021-07-09 ASSESSMENT — ENCOUNTER SYMPTOMS
APNEA: 1
CONSTIPATION: 1
PHOTOPHOBIA: 0
BLOOD IN STOOL: 0
DIARRHEA: 0
BACK PAIN: 0
SHORTNESS OF BREATH: 0

## 2021-07-26 DIAGNOSIS — T87.9 AKA STUMP COMPLICATION (HCC): ICD-10-CM

## 2021-07-26 DIAGNOSIS — G89.4 CHRONIC PAIN SYNDROME: ICD-10-CM

## 2021-07-26 DIAGNOSIS — T81.49XA POSTOPERATIVE WOUND INFECTION: ICD-10-CM

## 2021-07-26 RX ORDER — OXYCODONE AND ACETAMINOPHEN 7.5; 325 MG/1; MG/1
1 TABLET ORAL EVERY 6 HOURS PRN
Qty: 120 TABLET | Refills: 0 | Status: SHIPPED
Start: 2021-07-26 | End: 2021-08-24 | Stop reason: SDUPTHER

## 2021-07-30 DIAGNOSIS — I73.9 PVD (PERIPHERAL VASCULAR DISEASE) (HCC): ICD-10-CM

## 2021-07-30 RX ORDER — CILOSTAZOL 50 MG/1
TABLET ORAL
Qty: 180 TABLET | Refills: 1 | Status: SHIPPED
Start: 2021-07-30 | End: 2022-04-18 | Stop reason: SDUPTHER

## 2021-08-04 ENCOUNTER — HOSPITAL ENCOUNTER (OUTPATIENT)
Dept: WOUND CARE | Age: 66
Discharge: HOME | End: 2021-08-04
Payer: COMMERCIAL

## 2021-08-06 DIAGNOSIS — G89.4 CHRONIC PAIN SYNDROME: ICD-10-CM

## 2021-08-06 RX ORDER — OXYCODONE HCL 20 MG/1
20 TABLET, FILM COATED, EXTENDED RELEASE ORAL 2 TIMES DAILY
Qty: 60 TABLET | Refills: 0 | Status: SHIPPED
Start: 2021-08-06 | End: 2021-09-08 | Stop reason: SDUPTHER

## 2021-08-18 ENCOUNTER — HOSPITAL ENCOUNTER (OUTPATIENT)
Dept: WOUND CARE | Age: 66
Discharge: HOME | End: 2021-08-18
Payer: COMMERCIAL

## 2021-08-18 VITALS
WEIGHT: 245 LBS | HEIGHT: 74 IN | HEART RATE: 82 BPM | DIASTOLIC BLOOD PRESSURE: 78 MMHG | SYSTOLIC BLOOD PRESSURE: 138 MMHG | RESPIRATION RATE: 18 BRPM | TEMPERATURE: 98.6 F | BODY MASS INDEX: 31.44 KG/M2

## 2021-08-18 DIAGNOSIS — T81.32XS: Primary | ICD-10-CM

## 2021-08-18 DIAGNOSIS — L97.112: ICD-10-CM

## 2021-08-18 PROCEDURE — 6370000000 HC RX 637 (ALT 250 FOR IP): Performed by: SURGERY

## 2021-08-18 PROCEDURE — 11042 DBRDMT SUBQ TIS 1ST 20SQCM/<: CPT | Performed by: SURGERY

## 2021-08-18 PROCEDURE — 11042 DBRDMT SUBQ TIS 1ST 20SQCM/<: CPT

## 2021-08-18 RX ORDER — BACITRACIN ZINC AND POLYMYXIN B SULFATE 500; 1000 [USP'U]/G; [USP'U]/G
OINTMENT TOPICAL ONCE
Status: CANCELLED | OUTPATIENT
Start: 2021-08-18 | End: 2021-08-18

## 2021-08-18 RX ORDER — LIDOCAINE HYDROCHLORIDE 40 MG/ML
SOLUTION TOPICAL ONCE
Status: CANCELLED | OUTPATIENT
Start: 2021-08-18 | End: 2021-08-18

## 2021-08-18 RX ORDER — LIDOCAINE HYDROCHLORIDE 40 MG/ML
SOLUTION TOPICAL ONCE
Status: COMPLETED | OUTPATIENT
Start: 2021-08-18 | End: 2021-08-18

## 2021-08-18 RX ORDER — LIDOCAINE 50 MG/G
OINTMENT TOPICAL ONCE
Status: CANCELLED | OUTPATIENT
Start: 2021-08-18 | End: 2021-08-18

## 2021-08-18 RX ORDER — BACITRACIN, NEOMYCIN, POLYMYXIN B 400; 3.5; 5 [USP'U]/G; MG/G; [USP'U]/G
OINTMENT TOPICAL ONCE
Status: CANCELLED | OUTPATIENT
Start: 2021-08-18 | End: 2021-08-18

## 2021-08-18 RX ORDER — GENTAMICIN SULFATE 1 MG/G
OINTMENT TOPICAL ONCE
Status: CANCELLED | OUTPATIENT
Start: 2021-08-18 | End: 2021-08-18

## 2021-08-18 RX ORDER — BETAMETHASONE DIPROPIONATE 0.05 %
OINTMENT (GRAM) TOPICAL ONCE
Status: CANCELLED | OUTPATIENT
Start: 2021-08-18 | End: 2021-08-18

## 2021-08-18 RX ORDER — CLOBETASOL PROPIONATE 0.5 MG/G
OINTMENT TOPICAL ONCE
Status: CANCELLED | OUTPATIENT
Start: 2021-08-18 | End: 2021-08-18

## 2021-08-18 RX ORDER — LIDOCAINE HYDROCHLORIDE 20 MG/ML
JELLY TOPICAL ONCE
Status: CANCELLED | OUTPATIENT
Start: 2021-08-18 | End: 2021-08-18

## 2021-08-18 RX ORDER — GINSENG 100 MG
CAPSULE ORAL ONCE
Status: CANCELLED | OUTPATIENT
Start: 2021-08-18 | End: 2021-08-18

## 2021-08-18 RX ORDER — LIDOCAINE 40 MG/G
CREAM TOPICAL ONCE
Status: CANCELLED | OUTPATIENT
Start: 2021-08-18 | End: 2021-08-18

## 2021-08-18 RX ADMIN — LIDOCAINE HYDROCHLORIDE 3 ML: 40 SOLUTION TOPICAL at 12:36

## 2021-08-18 ASSESSMENT — PAIN DESCRIPTION - LOCATION: LOCATION: LEG

## 2021-08-18 ASSESSMENT — PAIN DESCRIPTION - ONSET: ONSET: ON-GOING

## 2021-08-18 ASSESSMENT — PAIN DESCRIPTION - FREQUENCY: FREQUENCY: INTERMITTENT

## 2021-08-18 ASSESSMENT — PAIN DESCRIPTION - DESCRIPTORS: DESCRIPTORS: SHARP;DULL

## 2021-08-18 ASSESSMENT — PAIN DESCRIPTION - PAIN TYPE: TYPE: CHRONIC PAIN

## 2021-08-18 ASSESSMENT — PAIN - FUNCTIONAL ASSESSMENT: PAIN_FUNCTIONAL_ASSESSMENT: PREVENTS OR INTERFERES SOME ACTIVE ACTIVITIES AND ADLS

## 2021-08-18 ASSESSMENT — PAIN SCALES - GENERAL: PAINLEVEL_OUTOF10: 3

## 2021-08-18 ASSESSMENT — PAIN DESCRIPTION - ORIENTATION: ORIENTATION: RIGHT;UPPER

## 2021-08-18 ASSESSMENT — PAIN DESCRIPTION - PROGRESSION: CLINICAL_PROGRESSION: NOT CHANGED

## 2021-08-24 DIAGNOSIS — T81.49XA POSTOPERATIVE WOUND INFECTION: ICD-10-CM

## 2021-08-24 DIAGNOSIS — G89.4 CHRONIC PAIN SYNDROME: ICD-10-CM

## 2021-08-24 DIAGNOSIS — T87.9 AKA STUMP COMPLICATION (HCC): ICD-10-CM

## 2021-08-24 DIAGNOSIS — I10 HYPERTENSION, UNSPECIFIED TYPE: ICD-10-CM

## 2021-08-24 RX ORDER — AMLODIPINE BESYLATE 10 MG/1
10 TABLET ORAL DAILY
Qty: 90 TABLET | Refills: 0 | Status: SHIPPED
Start: 2021-08-24 | End: 2021-09-28 | Stop reason: SDUPTHER

## 2021-08-24 RX ORDER — CHLORTHALIDONE 25 MG/1
TABLET ORAL
Qty: 90 TABLET | Refills: 0 | Status: SHIPPED
Start: 2021-08-24 | End: 2021-09-28 | Stop reason: SDUPTHER

## 2021-08-24 RX ORDER — OXYCODONE AND ACETAMINOPHEN 7.5; 325 MG/1; MG/1
1 TABLET ORAL EVERY 6 HOURS PRN
Qty: 120 TABLET | Refills: 0 | Status: SHIPPED
Start: 2021-08-24 | End: 2021-09-22 | Stop reason: SDUPTHER

## 2021-08-25 NOTE — PROGRESS NOTES
[facility]aling Center Followup Visit Note    Referring Physician : Nohelia Norris Str RECORD NUMBER:  [id number]  AGE: 63 y.o. GENDER: male  : 1955  EPISODE DATE:  2021    Subjective:     Chief Complaint   Patient presents with    Wound Check     right above the knee amp wound      HISTORY of PRESENT ILLNESS HPI   Vijay Forrest is a 63 y.o. male who presents today in regards to follow up evaluation and treatment of wound/ulcer. That patient's past medical, family and social hx were reviewed and changes were made if present. History of Wound Context:  Pt presents in regards to chronic R above knee amputation wound was since 2020. He had developed postoperatively dehiscence of his wound site which had previously undergone a muscle flap. He was having a packed per home health care. The wound had gotten so small that it was difficult to pack. He is still having drainage significant though. At first visit to wound healing center 20 his wound has not been improving. He was not on abx at initial visit to center.     Previous lower extremity procedures  2018  R AKA for chronic R LE wound   3/20/2020 R groin exploration with iliofemoral artery embolectomy, primary closure of arteriotomy, R deep femoral atery embolectomy   2020 Excisional debridement of infected necrotic  tissue, right groin and right thigh   2020 RIGHT THIGH WOUND DRESSING CHANGE and DEBRIDEMENT   2020 Irrigation and excisional debridement of Right Thigh above knee amputation wound   2020 Irrigation and excisional debridement of the Right thigh above knee amputation wound 25 x45 cm  Excision of prosthetic R LE bypass graft  Ligation of right SFA  Posterior hamstring myocutaneous flap 65 cm flap for open wound of 45 x 25 cm per Dr. Naga Hays     20  · Wound itself is small - opened up as difficult to pack  · 5 cm depth - culture done, significant amount of fluid expressed once opened up  · aquacell ag packing  · Discussed potential need for further surgical exploration in future - pt would like to avoid hospital and OR if possible  9/2/20  · Wound much improved - depth less  · Start doxycyline for staph culture  9/9/20  · Depth 5 cm again, fluid seems less  9/23/20  · Tunneling now 8 cm, concern that hittig bone  · Discussed with patient continuing current reigmen unlikely to result in healing  · Would benefit from further surgical debridement, likely revision of amputation site and possible wound vac  · Patient would like to consider options and will let me know next week  9/30/20  · Significant tunneling still  · Will proceed with or in 2 weeks per pt request  10/21/20  · OR 10/14/20  · dakins moistened kerlex  · Discussed with Dr. Jimmie Gomes re pain control issues  · Doxycycline 10 days script given  10/28/20  · Pain better controlled  · + Granulation tissue  · Bone less exposed  11/4/20  · Wound health  · Bone still exposed  11/18/20  · Wound continues to be improved  · Less bone exposed  11/25/20  · Bone exposure still an issue  · Will take for surgery - try dermagraft  · If not successful will likely need revision in future  12/9/20  · Bone exposure, wound overall improvd  · Patient did not follow though with testing for surgery  12/17/20  · dermagraft application  26/01/08  · Wound improved, bone still exposed  1/6/21  · Wound improved, bone still exposed  1/13/21  · Bone almost completely covered  1/27/21  · Bone covered, wound improved  2/10/21  · wound improved,depth less  2/24/21  · Wound stable - 3.5 cm depth  3/10/21  · Wound stable 4 cm depth  3/24/21  · Wound stable, same depth not improving - discussed with patient concerning and may need further debridement  3/31/21  · Depth improved 3.5 cm  4/14/21  · Depth improved 3 cm   4/21/21  · Depth improved 2.8 cm  4/28/21  · Depth stable  5/12/21  · Increased pain  · Suspect wound has closed down to small and needs opened up as fluid is being trapped  · Will admit through er  · Or planned for tomorrow  5/12-20/21  · OR debridement, resection of more femur 5/14/21, Debridement and closure 5/18/21 6/2/21  · Most of wound is closed  · Small area laterally that is open and has ~ 2 cm depth - pack with aquacell  6/9/21  · Sutures removed  · Lateral wound 4 cm dept - pack   6/23/21  · Lateral wound stable 4 cm  · Patient states he does want any more aggressive intervention - not much to offer him at this point  · We did discuss importance of keeping wound clean and infection free  · Will continue with dressing changes  · Having a lot of issues phantom pain - on neurontin 800 mg qid  7/7/21  · Overall feeling better  · Depth decreased to 3.5 cm  8/17/21  · Depth slightly improved 3 cm    Wound/Ulcer Pain Timing/Severity:  moderate  Quality of pain: aching, throbbing, shooting   Severity:  5 / 10   Modifying Factors: Pain worsens with dressing changes, pressure, debridement  Associated Signs/Symptoms: drainage and pain    Ulcer Identification:  Ulcer Type: arterial, diabetic, pressure and non-healing surgical  Contributing Factors: edema, diabetes, poor glucose control, chronic pressure, decreased mobility, shear force, obesity and arterial insufficiency    Diabetic/Pressure/Non Pressure Ulcers only:  Ulcer: Diabetic ulcer, fat layer exposed    Wound: Wound dehiscence        PAST MEDICAL HISTORY      Diagnosis Date    Acute kidney injury ([address] 5/18/2020    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 ([address] 5/22/2018    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 ([address] 5/21/2018    Cellulitis, scrotum 3/20/2020    Critical lower limb ischemia ([address] 3/20/2020    Diabetes mellitus ([address]     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 2 diabetes mellitus, with fat layer exposed ([address] 5/22/2018    Disruption of closure of muscle or muscle flap, sequela 8/26/2020    DVT, lower extremity (HCC)     right leg     Encounter for dental examination and cleaning with abnormal findings 4/2/2018    Gas gangrene of thigh ([facility] 3/20/2020    History of DVT (deep vein thrombosis) 7/31/2018    Hyperglycemia due to type 2 diabetes mellitus ([facility] 3/20/2020    Hyperlipidemia     Hypertension     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[facility]     Osteomyelitis ([facility] 10/24/2018    PVD (peripheral vascular disease) ([facility]      Past Surgical History:   Procedure Laterality Date    AMPUTATION ABOVE KNEE Right 4/28/2020    DEBRIDEMENT OF AMPUTATION RIGHT ABOVE KNEE performed by Joanne Montalvo MD at [address] [place] [address] Right 4/30/2020    DEBRIDEMENT OF AMPUTATION RIGHT ABOVE KNEE,  POSS. ABOVE KNEE REVISION, POSS. CLOSURE, POSS.WOUND VAC -- BEN Emmanuel / Germania Rice TO LOOK IN performed by Joanne Montalvo MD at [place] [address] Right 3/20/2020    RIGHT LOWER EXTREMITY THROMBECTOMY, POSSIBLE ANGIOGRAM, POSSIBLE INTERVENTION, POSSIBLE BYPASS. performed by Joanne Montalvo MD at [address] Right 4/17/2020    RIGHT LEG DEBRIDEMENT INCISION AND DRAINAGE performed by Dagmar Lake MD at [address] Right 4/20/2020    RIGHT THIGH WOUND DRESSING CHANGE; DEBRIDEMENT, removal of muscle performed by Dagmar Lake MD at [address] Right 4/21/2020    RIGHT THIGH WOUND DRESSING CHANGE POSSIBLE  DEBRIDEMENT - NEEDS BEN Emmanuel / JANETTE RON SEE performed by Yesenia Montiel MD at [address] Right 4/23/2020    RIGHT THIGH WOUND DRESSING CHANGE and DEBRIDEMENT performed by Joanne Montalvo MD at [address] Right 10/15/2020    DEBRIDEMENT RIGHT ABOVE KNEE AMPUTATION POSS. REVISION POSS. WOUND VAC performed by Joanne Montalvo MD at [address] Right 12/17/2020    DEBRIDEMENT  RIGHT ABOVE KNEE AMPUTATION WITH POSS. ADVANCED SKIN THERAPY performed by Jackson Rincon MD at [address] [facility] Right 5/14/2021    DEBRIDEMENT RIGHT ABOVE KNEE AMPUTATION performed by Jeanie Mckenzie MD at [address] Right 5/18/2021    DEBRIDEMENT ABOVE THE KNEE AMPUTATION , WITH WOUND VAC APPLICATION performed by Jackson Rincon MD at [facility]  Right 11/1/2018    AMPUTATION ABOVE KNEE RIGHT LEG performed by Jackson Rincon MD at [facility] Right 5/24/2018    RIGHT FOOT INCISION AND DRAINAGE WITH PARTIAL BONE RESECTION performed by Glenda Tran DPM at [facility] OFFICE/OUTPT VISIT,PROCEDURE ONLY Right 8/3/2018    INCISION AND DRAINAGE MULTIPLE AREAS RIGHT FOOT WITH DEBRIDEMENT SOFT TISSUE performed by Glenda Tran DPM at [facility] OR    RHINOPLASTY Right     leg      Family History   Problem Relation Age of Onset    Cancer Mother     Diabetes Father     Heart Failure Father     Hypertension Father     Cancer Sister      Social History     Tobacco Use    Smoking status: Current Every Day Smoker     Packs/day: 0.50     Years: 7.00     Pack years: 3.50     Types: Cigarettes    Smokeless tobacco: Never Used   Vaping Use    Vaping Use: Never used   Substance Use Topics    Alcohol use: No    Drug use: No     No Known Allergies  Current Outpatient Medications on File Prior to Encounter   Medication Sig Dispense Refill    oxyCODONE (OXYCONTIN) 20 MG extended release tablet Take 1 tablet by mouth 2 times daily for 30 days.  Intended supply: 30 days 60 tablet 0    cilostazol (PLETAL) 50 MG tablet TAKE 1 TABLET BY MOUTH 2 TIMES A  tablet 1    doxycycline hyclate (VIBRAMYCIN) 100 MG capsule Take 1 capsule by mouth 2 times daily 60 capsule 2    Insulin Syringe-Needle U-100 ([employer] INSULIN SYRINGE) 31G X 5/16\" 0.3 ML MISC 1 each by Other route 5 times daily 100 each 3    hydrALAZINE (APRESOLINE) 25 MG tablet TAKE 1 TABLET BY MOUTH 3 TIMES DAILY. 90 tablet 3    insulin glargine (LANTUS SOLOSTAR) 100 UNIT/ML injection pen Inject 25 Units into the skin nightly 5 pen 3    Insulin Pen Needle 29G X 12MM MISC 1 each by Does not apply route daily 100 each 3    DULoxetine (CYMBALTA) 60 MG extended release capsule TAKE TWO CAPSULES BY MOUTH EVERY MORNING 180 capsule 1    mirtazapine (REMERON) 7.5 MG tablet TAKE 1 TABLET BY MOUTH ONE TIME A DAY NIGHTLY 90 tablet 1    insulin lispro (HUMALOG) 100 UNIT/ML injection vial Inject 15 Units into the skin 3 times daily (before meals) (Patient taking differently: Inject 12 Units into the skin 3 times daily (before meals) For every 20 units above 200 mg/dL patient takes an additional 2 units) 15 vial 3    ammonium lactate (LAC-HYDRIN) 12 % lotion Apply topically as needed. 1 Bottle 0    cloNIDine (CATAPRES) 0.1 MG tablet TAKE ONE TABLET BY MOUTH THREE TIMES DAILY 270 tablet 3    mineral oil-hydrophilic petrolatum (AQUAPHOR) ointment Apply topically twice daily dispense 14 ounce jar (Patient taking differently: Apply topically once a week ) 396 g 5    blood glucose test strips (ONETOUCH ULTRA) strip 1 each by Other route 3 times daily As needed. 300 each 1    Handicap Placard MISC by Does not apply route Patient cannot walk 200 ft without stopping to rest.    Expiration 10/21/2024 1 each 0    lactobacillus (CULTURELLE) CAPS capsule Take 2 capsules by mouth daily 60 capsule 1    gabapentin (NEURONTIN) 800 MG tablet Take 1 tablet by mouth 4 times daily for 30 days. 120 tablet 5     No current facility-administered medications on file prior to encounter.        REVIEW OF SYSTEMS See HPI    Objective:    /78   Pulse 82   Temp 98.6 °F (37 °C) (Temporal)   Resp 18   Ht 6' 2\" (1.88 m)   Wt 245 lb (111.1 kg)   BMI 31.46 kg/m²   Wt Readings from Last 3 Encounters:   08/18/21 245 lb (111.1 kg)   07/08/21 245 lb (111.1 kg)   07/07/21 245 lb (111.1 kg)     PHYSICAL EXAM  CONSTITUTIONAL:   Awake, alert, cooperative   EYES:  lids and lashes normal   ENT: external ears and nose without lesions   NECK:  supple, symmetrical, trachea midline   SKIN:  Open wound Present    Assessment:     Problem List Items Addressed This Visit     Disruption of closure of muscle or muscle flap, sequela - Primary (Chronic)    Ischemic ulcer of right thigh with fat layer exposed ([address]          Pre Debridement Measurements:  Are located in the Rankin  Documentation Flow Sheet  Post Debridement Measurements:  Wound/Ulcer Descriptions are Pre Debridement except measurements:     Wound 06/02/21 Hip Right #2 (Active)   Wound Image   08/18/21 1229   Wound Etiology Surgical 06/02/21 1124   Dressing Status New dressing applied;Clean;Dry; Intact 06/23/21 1210   Wound Cleansed Cleansed with saline 06/23/21 1210   Dressing/Treatment Alginate 06/23/21 1210   Offloading for Diabetic Foot Ulcers Other (comment) 06/23/21 1210   Wound Length (cm) 0.3 cm 08/18/21 1229   Wound Width (cm) 0.3 cm 08/18/21 1229   Wound Depth (cm) 1 cm 08/18/21 1229   Wound Surface Area (cm^2) 0.09 cm^2 08/18/21 1229   Change in Wound Size % (l*w) 76.92 08/18/21 1229   Wound Volume (cm^3) 0.09 cm^3 08/18/21 1229   Wound Healing % 85 08/18/21 1229   Post-Procedure Length (cm) 0.4 cm 08/18/21 1254   Post-Procedure Width (cm) 0.4 cm 08/18/21 1254   Post-Procedure Depth (cm) 3.5 cm 08/18/21 1254   Post-Procedure Surface Area (cm^2) 0.16 cm^2 08/18/21 1254   Post-Procedure Volume (cm^3) 0.56 cm^3 08/18/21 1254   Distance Tunneling (cm) 4 cm 06/23/21 1153   Tunneling Position ___ O'Clock 12 06/23/21 1153   Undermining Starts ___ O'Clock 12 08/18/21 1229   Undermining Ends___ O'Clock 7 08/18/21 1229   Undermining Maxium Distance (cm)  0.6 @7 08/18/21 1229   Wound Assessment Pink/red;Fibrin 08/18/21 1229   Drainage Amount Moderate 08/18/21 1229   Drainage Description Sanguinous; Serosanguinous; Yellow 08/18/21 1229   Odor None 08/18/21 1229   Nadia-wound Assessment Intact 07/07/21 1150   Number of days: 83         Procedure Note  Indications:  Based on my examination of this patient's wound(s)/ulcer(s) today, debridement is required to promote healing and evaluate the wound base. Performed by: Dom Sal MD    Consent obtained:  Yes    Time out taken:  Yes    Pain Control:       Debridement:Excisional Debridement    Using curette the wound(s)/ulcer(s) was/were sharply debrided down through and including the removal of epidermis, dermis and subcutaneous tissue. Devitalized Tissue Debrided:  fibrin, biofilm, slough and exudate to stimulate bleeding to promote healing, post debridement good bleeding base and wound edges noted    Wound/Ulcer #: 1    Percent of Wound/Ulcer Debrided: 100%    Total Surface Area Debrided:  0.09 sq cm     Estimated Blood Loss:  Minimal  Hemostasis Achieved:  by pressure    Procedural Pain:  6  / 10   Post Procedural Pain:  5 / 10     Response to treatment:  Well tolerated    Plan:   Treatment Note please see attached Discharge Instructions    Written patient dismissal instructions given to patient and signed by patient or POA. Discharge Instructions       Discharge Instructions                  Visit Discharge/Physician Orders     Discharge condition: Stable     Assessment of pain at discharge:yes     Anesthetic used: 4% lidocaine soln.     Discharge to: Home     Left via:Private automobile     Accompanied by: spouse     ECF/HHA: [phone](note tunneling at 12 of 3.7cm's)     Dressing Orders:RIGHT AKA SITE-Cleanse with normal saline, pack loosely with calcium alginate, dry dressing and secure. Change daily.     Treatment Orders:Eat a diet high in protein and vitamin C. Take a multiple vitamin daily unless contraindicated.      Stop smoking!     [facility] followup visit: 2 weeks _____________________________  (Please note your next appointment above and if you are unable to keep, kindly give a 24 hour notice.  Thank

## 2021-09-02 RX ORDER — MIRTAZAPINE 7.5 MG/1
TABLET, FILM COATED ORAL
Qty: 90 TABLET | Refills: 1 | Status: SHIPPED
Start: 2021-09-02 | End: 2021-09-03

## 2021-09-03 RX ORDER — MIRTAZAPINE 7.5 MG/1
TABLET, FILM COATED ORAL
Qty: 90 TABLET | Refills: 1 | Status: SHIPPED
Start: 2021-09-03 | End: 2022-02-28 | Stop reason: SDUPTHER

## 2021-09-08 DIAGNOSIS — G89.4 CHRONIC PAIN SYNDROME: ICD-10-CM

## 2021-09-08 RX ORDER — OXYCODONE HCL 20 MG/1
20 TABLET, FILM COATED, EXTENDED RELEASE ORAL 2 TIMES DAILY
Qty: 60 TABLET | Refills: 0 | Status: SHIPPED
Start: 2021-09-08 | End: 2021-10-04 | Stop reason: SDUPTHER

## 2021-09-09 ENCOUNTER — OFFICE VISIT (OUTPATIENT)
Dept: PRIMARY CARE CLINIC | Age: 66
End: 2021-09-09
Payer: COMMERCIAL

## 2021-09-09 VITALS
BODY MASS INDEX: 31.46 KG/M2 | HEIGHT: 74 IN | SYSTOLIC BLOOD PRESSURE: 134 MMHG | TEMPERATURE: 97.8 F | HEART RATE: 67 BPM | DIASTOLIC BLOOD PRESSURE: 70 MMHG | OXYGEN SATURATION: 98 %

## 2021-09-09 DIAGNOSIS — E11.69 CONTROLLED TYPE 2 DIABETES MELLITUS WITH OTHER SPECIFIED COMPLICATION, WITH LONG-TERM CURRENT USE OF INSULIN (HCC): Primary | ICD-10-CM

## 2021-09-09 DIAGNOSIS — Z00.00 ROUTINE GENERAL MEDICAL EXAMINATION AT A HEALTH CARE FACILITY: ICD-10-CM

## 2021-09-09 DIAGNOSIS — Z87.891 PERSONAL HISTORY OF TOBACCO USE, PRESENTING HAZARDS TO HEALTH: ICD-10-CM

## 2021-09-09 DIAGNOSIS — Z79.4 CONTROLLED TYPE 2 DIABETES MELLITUS WITH OTHER SPECIFIED COMPLICATION, WITH LONG-TERM CURRENT USE OF INSULIN (HCC): Primary | ICD-10-CM

## 2021-09-09 DIAGNOSIS — Z71.89 ACP (ADVANCE CARE PLANNING): ICD-10-CM

## 2021-09-09 DIAGNOSIS — Z13.6 SCREENING FOR CARDIOVASCULAR CONDITION: ICD-10-CM

## 2021-09-09 PROCEDURE — 3017F COLORECTAL CA SCREEN DOC REV: CPT | Performed by: FAMILY MEDICINE

## 2021-09-09 PROCEDURE — 3046F HEMOGLOBIN A1C LEVEL >9.0%: CPT | Performed by: FAMILY MEDICINE

## 2021-09-09 PROCEDURE — G0446 INTENS BEHAVE THER CARDIO DX: HCPCS | Performed by: FAMILY MEDICINE

## 2021-09-09 PROCEDURE — G0439 PPPS, SUBSEQ VISIT: HCPCS | Performed by: FAMILY MEDICINE

## 2021-09-09 PROCEDURE — 99497 ADVNCD CARE PLAN 30 MIN: CPT | Performed by: FAMILY MEDICINE

## 2021-09-09 PROCEDURE — G0447 BEHAVIOR COUNSEL OBESITY 15M: HCPCS | Performed by: FAMILY MEDICINE

## 2021-09-09 PROCEDURE — 99406 BEHAV CHNG SMOKING 3-10 MIN: CPT | Performed by: FAMILY MEDICINE

## 2021-09-09 RX ORDER — AMMONIUM LACTATE 12 G/100G
LOTION TOPICAL
Qty: 500 G | Refills: 5 | Status: SHIPPED
Start: 2021-09-09 | End: 2022-04-18 | Stop reason: SDUPTHER

## 2021-09-09 ASSESSMENT — PATIENT HEALTH QUESTIONNAIRE - PHQ9
1. LITTLE INTEREST OR PLEASURE IN DOING THINGS: 1
SUM OF ALL RESPONSES TO PHQ9 QUESTIONS 1 & 2: 2
SUM OF ALL RESPONSES TO PHQ QUESTIONS 1-9: 2
2. FEELING DOWN, DEPRESSED OR HOPELESS: 1
SUM OF ALL RESPONSES TO PHQ QUESTIONS 1-9: 2
SUM OF ALL RESPONSES TO PHQ QUESTIONS 1-9: 2

## 2021-09-09 ASSESSMENT — LIFESTYLE VARIABLES: HOW OFTEN DO YOU HAVE A DRINK CONTAINING ALCOHOL: 0

## 2021-09-09 NOTE — PATIENT INSTRUCTIONS
(Maybe you're afraid of having pain, losing your independence, or being kept alive by machines.)  · Where would you prefer to die? (Your home? A hospital? A nursing home?)  · Do you want to donate your organs when you die? · Do you want certain [facility] practices performed before you die? When should you call for help? Be sure to contact your doctor if you have any questions. Where can you learn more? Go to [email or web address] and sign in to your [employer] account. Enter R264 in the Ku box to learn more about \"Advance Directives: Care Instructions. \"     If you do not have an account, please click on the \"Sign Up Now\" link. Current as of: March 17, 2021               Content Version: 12.9  © 2006-2021 Healthwise, Incorporated. Care instructions adapted under license by [facility] ([facility]). If you have questions about a medical condition or this instruction, always ask your healthcare professional. [address] any warranty or liability for your use of this information. Body Mass Index: Care Instructions  Your Care Instructions     Body mass index (BMI) can help you see if your weight is raising your risk for health problems. It uses a formula to compare how much you weigh with how tall you are. · A BMI lower than 18.5 is considered underweight. · A BMI between 18.5 and 24.9 is considered healthy. · A BMI between 25 and 29.9 is considered overweight. A BMI of 30 or higher is considered obese. If your BMI is in the normal range, it means that you have a lower risk for weight-related health problems. If your BMI is in the overweight or obese range, you may be at increased risk for weight-related health problems, such as high blood pressure, heart disease, stroke, arthritis or joint pain, and diabetes.  If your BMI is in the underweight range, you may be at increased risk for health problems such as fatigue, lower protection (immunity) against illness, muscle loss, bone loss, hair loss, and hormone problems. BMI is just one measure of your risk for weight-related health problems. You may be at higher risk for health problems if you are not active, you eat an unhealthy diet, or you drink too much alcohol or use tobacco products. Follow-up care is a key part of your treatment and safety. Be sure to make and go to all appointments, and call your doctor if you are having problems. It's also a good idea to know your test results and keep a list of the medicines you take. How can you care for yourself at home? · Practice healthy eating habits. This includes eating plenty of fruits, vegetables, whole grains, lean protein, and low-fat dairy. · If your doctor recommends it, get more exercise. Walking is a good choice. Bit by bit, increase the amount you walk every day. Try for at least 30 minutes on most days of the week. · Do not smoke. Smoking can increase your risk for health problems. If you need help quitting, talk to your doctor about stop-smoking programs and medicines. These can increase your chances of quitting for good. · Limit alcohol to 2 drinks a day for men and 1 drink a day for women. Too much alcohol can cause health problems. If you have a BMI higher than 25  · Your doctor may do other tests to check your risk for weight-related health problems. This may include measuring the distance around your waist. A waist measurement of more than 40 inches in men or 35 inches in women can increase the risk of weight-related health problems. · Talk with your doctor about steps you can take to stay healthy or improve your health. You may need to make lifestyle changes to lose weight and stay healthy, such as changing your diet and getting regular exercise. If you have a BMI lower than 18.5  · Your doctor may do other tests to check your risk for health problems. · Talk with your doctor about steps you can take to stay healthy or improve your health. You may need to make lifestyle changes to gain or maintain weight and stay healthy, such as getting more healthy foods in your diet and doing exercises to build muscle. Where can you learn more? Go to https://[email or web address] org and sign in to your [employer] account. Enter S176 in the [facility] box to learn more about \"Body Mass Index: Care Instructions. \"     If you do not have an account, please click on the \"Sign Up Now\" link. Current as of: March 17, 2021               Content Version: 12.9  © 0951-8607 Healthwise, [employer]. Care instructions adapted under license by [facility] ([facility]). If you have questions about a medical condition or this instruction, always ask your healthcare professional. [address] any warranty or liability for your use of this information. DASH Diet: Care Instructions  Your Care Instructions     The DASH diet is an eating plan that can help lower your blood pressure. DASH stands for Dietary Approaches to Stop Hypertension. Hypertension is high blood pressure. The DASH diet focuses on eating foods that are high in calcium, potassium, and magnesium. These nutrients can lower blood pressure. The foods that are highest in these nutrients are fruits, vegetables, low-fat dairy products, nuts, seeds, and legumes. But taking calcium, potassium, and magnesium supplements instead of eating foods that are high in those nutrients does not have the same effect. The DASH diet also includes whole grains, fish, and poultry. The DASH diet is one of several lifestyle changes your doctor may recommend to lower your high blood pressure. Your doctor may also want you to decrease the amount of sodium in your diet. Lowering sodium while following the DASH diet can lower blood pressure even further than just the DASH diet alone. Follow-up care is a key part of your treatment and safety.  Be sure to make and go to all appointments, and call your doctor if you are having problems. It's also a good idea to know your test results and keep a list of the medicines you take. How can you care for yourself at home? Following the DASH diet  · Eat 4 to 5 servings of fruit each day. A serving is 1 medium-sized piece of fruit, ½ cup chopped or canned fruit, 1/4 cup dried fruit, or 4 ounces (½ cup) of fruit juice. Choose fruit more often than fruit juice. · Eat 4 to 5 servings of vegetables each day. A serving is 1 cup of lettuce or raw leafy vegetables, ½ cup of chopped or cooked vegetables, or 4 ounces (½ cup) of vegetable juice. Choose vegetables more often than vegetable juice. · Get 2 to 3 servings of low-fat and fat-free dairy each day. A serving is 8 ounces of milk, 1 cup of yogurt, or 1 ½ ounces of cheese. · Eat 6 to 8 servings of grains each day. A serving is 1 slice of bread, 1 ounce of dry cereal, or ½ cup of cooked rice, pasta, or cooked cereal. Try to choose whole-grain products as much as possible. · Limit lean meat, poultry, and fish to 2 servings each day. A serving is 3 ounces, about the size of a deck of cards. · Eat 4 to 5 servings of nuts, seeds, and legumes (cooked dried beans, lentils, and split peas) each week. A serving is 1/3 cup of nuts, 2 tablespoons of seeds, or ½ cup of cooked beans or peas. · Limit fats and oils to 2 to 3 servings each day. A serving is 1 teaspoon of vegetable oil or 2 tablespoons of salad dressing. · Limit sweets and added sugars to 5 servings or less a week. A serving is 1 tablespoon jelly or jam, ½ cup sorbet, or 1 cup of lemonade. · Eat less than 2,300 milligrams (mg) of sodium a day. If you limit your sodium to 1,500 mg a day, you can lower your blood pressure even more. · Be aware that all of these are the suggested number of servings for people who eat 1,800 to 2,000 calories a day. Your recommended number of servings may be different if you need more or fewer calories. Tips for success  · Start small.  Do not try to make dramatic changes to your diet all at once. You might feel that you are missing out on your favorite foods and then be more likely to not follow the plan. Make small changes, and stick with them. Once those changes become habit, add a few more changes. · Try some of the following:  ? Make it a goal to eat a fruit or vegetable at every meal and at snacks. This will make it easy to get the recommended amount of fruits and vegetables each day. ? Try yogurt topped with fruit and nuts for a snack or healthy dessert. ? Add lettuce, tomato, cucumber, and onion to sandwiches. ? Combine a ready-made pizza crust with low-fat mozzarella cheese and lots of vegetable toppings. Try using tomatoes, squash, spinach, broccoli, carrots, cauliflower, and onions. ? Have a variety of cut-up vegetables with a low-fat dip as an appetizer instead of chips and dip. ? Sprinkle sunflower seeds or chopped almonds over salads. Or try adding chopped walnuts or almonds to cooked vegetables. ? Try some vegetarian meals using beans and peas. Add garbanzo or kidney beans to salads. Make burritos and tacos with mashed cyr beans or black beans. Where can you learn more? Go to https://[email or web address] [email or web address].[email or web address] org and sign in to your [employer] account. Enter G107 in the [facility] box to learn more about \"DASH Diet: Care Instructions. \"     If you do not have an account, please click on the \"Sign Up Now\" link. Current as of: August 31, 2020               Content Version: 12.9  © 7445-1143 [employer]. Care instructions adapted under license by [facility] ([facility]). If you have questions about a medical condition or this instruction, always ask your healthcare professional. [place] [address] any warranty or liability for your use of this information. Stopping Smoking: Care Instructions  Your Care Instructions     Cigarette smokers crave the nicotine in cigarettes.  Giving it up is much harder than simply changing a habit. Your body has to stop craving the nicotine. It is hard to quit, but you can do it. There are many tools that people use to quit smoking. You may find that combining tools works best for you. There are several steps to quitting. First you get ready to quit. Then you get support to help you. After that, you learn new skills and behaviors to become a nonsmoker. For many people, a necessary step is getting and using medicine. Your doctor will help you set up the plan that best meets your needs. You may want to attend a smoking cessation program to help you quit smoking. When you choose a program, look for one that has proven success. Ask your doctor for ideas. You will greatly increase your chances of success if you take medicine as well as get counseling or join a cessation program.  Some of the changes you feel when you first quit tobacco are uncomfortable. Your body will miss the nicotine at first, and you may feel short-tempered and grumpy. You may have trouble sleeping or concentrating. Medicine can help you deal with these symptoms. You may struggle with changing your smoking habits and rituals. The last step is the tricky one: Be prepared for the smoking urge to continue for a time. This is a lot to deal with, but keep at it. You will feel better. Follow-up care is a key part of your treatment and safety. Be sure to make and go to all appointments, and call your doctor if you are having problems. It's also a good idea to know your test results and keep a list of the medicines you take. How can you care for yourself at home? · Ask your family, friends, and coworkers for support. You have a better chance of quitting if you have help and support. · Join a support group, such as Nicotine Anonymous, for people who are trying to quit smoking.   · Consider signing up for a smoking cessation program, such as the American Lung Association's [place] from Smoking program.  · Get text messaging support. Go to the website at www.smokefree. gov to sign up for the [facility] program.  · Set a quit date. Pick your date carefully so that it is not right in the middle of a big deadline or stressful time. Once you quit, do not even take a puff. Get rid of all ashtrays and lighters after your last cigarette. Clean your house and your clothes so that they do not smell of smoke. · Learn how to be a nonsmoker. Think about ways you can avoid those things that make you reach for a cigarette. ? Avoid situations that put you at greatest risk for smoking. For some people, it is hard to have a drink with friends without smoking. For others, they might skip a coffee break with coworkers who smoke. ? Change your daily routine. Take a different route to work or eat a meal in a different place. · Cut down on stress. Calm yourself or release tension by doing an activity you enjoy, such as reading a book, taking a hot bath, or gardening. · Talk to your doctor or pharmacist about nicotine replacement therapy, which replaces the nicotine in your body. You still get nicotine but you do not use tobacco. Nicotine replacement products help you slowly reduce the amount of nicotine you need. These products come in several forms, many of them available over-the-counter:  ? Nicotine patches  ? Nicotine gum and lozenges  ? Nicotine inhaler  · Ask your doctor about bupropion (Wellbutrin) or varenicline (Chantix), which are prescription medicines. They do not contain nicotine. They help you by reducing withdrawal symptoms, such as stress and anxiety. · Some people find hypnosis, acupuncture, and massage helpful for ending the smoking habit. · Eat a healthy diet and get regular exercise. Having healthy habits will help your body move past its craving for nicotine. · Be prepared to keep trying. Most people are not successful the first few times they try to quit.  Do not get mad at yourself if you smoke again. Make a list of things you learned and think about when you want to try again, such as next week, next month, or next year. Where can you learn more? Go to https://ActionFlowperejiCENTRI Technology.famPlus. org and sign in to your [email or web address] account. Enter P426 in the [facility] box to learn more about \"Stopping Smoking: Care Instructions. \"     If you do not have an account, please click on the \"Sign Up Now\" link. Current as of: February 11, 2021               Content Version: 12.9  © 7949-5044 Healthwise, [employer]. Care instructions adapted under license by [facility] ([facility]). If you have questions about a medical condition or this instruction, always ask your healthcare professional. [address] any warranty or liability for your use of this information. Personalized Preventive Plan for July Failing - 9/9/2021  Medicare offers a range of preventive health benefits. Some of the tests and screenings are paid in full while other may be subject to a deductible, co-insurance, and/or copay. Some of these benefits include a comprehensive review of your medical history including lifestyle, illnesses that may run in your family, and various assessments and screenings as appropriate. After reviewing your medical record and screening and assessments performed today your provider may have ordered immunizations, labs, imaging, and/or referrals for you. A list of these orders (if applicable) as well as your Preventive Care list are included within your After Visit Summary for your review. Other Preventive Recommendations:    · A preventive eye exam performed by an eye specialist is recommended every 1-2 years to screen for glaucoma; cataracts, macular degeneration, and other eye disorders. · A preventive dental visit is recommended every 6 months. · Try to get at least 150 minutes of exercise per week or 10,000 steps per day on a pedometer .   · Order or download the FREE \"Exercise & Physical Activity: Your Everyday Guide\" from [employer]. Call 7-113.572.7706 or search [employer] online. · You need 2849-1420 mg of calcium and 6802-0546 IU of vitamin D per day. It is possible to meet your calcium requirement with diet alone, but a vitamin D supplement is usually necessary to meet this goal.  · When exposed to the sun, use a sunscreen that protects against both UVA and UVB radiation with an SPF of 30 or greater. Reapply every 2 to 3 hours or after sweating, drying off with a towel, or swimming. · Always wear a seat belt when traveling in a car. Always wear a helmet when riding a bicycle or motorcycle.

## 2021-09-09 NOTE — PROGRESS NOTES
Medicare Annual Wellness Visit  Name: Abigail Harrington Date: 2021   MRN: [id number] Sex: Male   Age: 63 y.o. Ethnicity: Non- / Non    : 1955 Race: Black /       Beth Berman is here for Medicare AWV    Screenings for behavioral, psychosocial and functional/safety risks, and cognitive dysfunction are all negative except as indicated below. These results, as well as other patient data from the [address] form, are documented in Flowsheets linked to this Encounter. No Known Allergies      Prior to Visit Medications    Medication Sig Taking? Authorizing Provider   ammonium lactate (LAC-HYDRIN) 12 % lotion Apply topically as needed. Yes Juanito Kline, DO   oxyCODONE (OXYCONTIN) 20 MG extended release tablet Take 1 tablet by mouth 2 times daily for 30 days. Intended supply: 30 days Yes Juanito Kline, DO   mirtazapine (REMERON) 7.5 MG tablet TAKE ONE TABLET BY MOUTH ONCE NIGHTLY Yes Juanito Kline, DO   amLODIPine (NORVASC) 10 MG tablet Take 1 tablet by mouth daily Yes Juanito Kline,    chlorthalidone (HYGROTON) 25 MG tablet TAKE 1 TABLET BY MOUTH ONE TIME A DAY Yes Juanito Kline, DO   metoprolol tartrate (LOPRESSOR) 25 MG tablet TAKE ONE TABLET BY MOUTH TWO TIMES DAILY Yes Juanito Kline DO   oxyCODONE-acetaminophen (PERCOCET) 7.5-325 MG per tablet Take 1 tablet by mouth every 6 hours as needed for Pain for up to 30 days. Intended supply: 30 days Yes Juanito Kline, DO   cilostazol (PLETAL) 50 MG tablet TAKE 1 TABLET BY MOUTH 2 TIMES A DAY Yes Juanito Kline,    doxycycline hyclate (VIBRAMYCIN) 100 MG capsule Take 1 capsule by mouth 2 times daily Yes SHONDA Cuello - CNP   Insulin Syringe-Needle U-100 (ULTICARE INSULIN SYRINGE) 31G X 5/16\" 0.3 ML MISC 1 each by Other route 5 times daily Yes Juanito Kline, DO   hydrALAZINE (APRESOLINE) 25 MG tablet TAKE 1 TABLET BY MOUTH 3 TIMES DAILY.  Yes Juanito Kline, DO   insulin glargine (LANTUS SOLOSTAR) 100 UNIT/ML injection pen Inject 25 Units into the skin nightly  Patient taking differently: Inject 30 Units into the skin nightly 10am and 30pm Yes Juanito Kline, DO   Insulin Pen Needle 29G X 12MM MISC 1 each by Does not apply route daily Yes Juanito Kline, DO   DULoxetine (CYMBALTA) 60 MG extended release capsule TAKE TWO CAPSULES BY MOUTH EVERY MORNING Yes Juanito Kline DO   cloNIDine (CATAPRES) 0.1 MG tablet TAKE ONE TABLET BY MOUTH THREE TIMES DAILY Yes Juanito Kline DO   mineral oil-hydrophilic petrolatum (AQUAPHOR) ointment Apply topically twice daily dispense 14 ounce jar  Patient taking differently: Apply topically once a week  Yes Juanito Kline, DO   blood glucose test strips (ONETOUCH ULTRA) strip 1 each by Other route 3 times daily As needed. Yes Juanito Kline, DO   Handicap Placard MISC by Does not apply route Patient cannot walk 200 ft without stopping to rest.    Expiration 10/21/2024 Yes Juanito Kline DO   lactobacillus (CULTURELLE) CAPS capsule Take 2 capsules by mouth daily  Mikala Wilburn MD   insulin lispro (HUMALOG) 100 UNIT/ML injection vial Inject 15 Units into the skin 3 times daily (before meals)  Patient not taking: Reported on 9/9/2021  Juanito Kline DO   gabapentin (NEURONTIN) 800 MG tablet Take 1 tablet by mouth 4 times daily for 30 days.   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 ([address] 5/22/2018    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 ([address] 5/21/2018    Cellulitis, scrotum 3/20/2020    Critical lower limb ischemia ([address] 3/20/2020    Diabetes mellitus ([address]     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 2 diabetes mellitus, with fat layer exposed ([address] 5/22/2018    Disruption of closure of muscle or muscle flap, sequela 8/26/2020    DVT, lower extremity ([address] Mynor Calles MD at [address] Right 5/14/2021    DEBRIDEMENT RIGHT ABOVE KNEE AMPUTATION performed by Chauncey Fuentes MD at [address] Right 5/18/2021    DEBRIDEMENT ABOVE THE KNEE AMPUTATION , WITH WOUND VAC APPLICATION performed by Hossein Garcia MD at [address] Right 11/1/2018    AMPUTATION ABOVE KNEE RIGHT LEG performed by Hossein Garcia MD at [address] [facility] Right 5/24/2018    RIGHT FOOT INCISION AND DRAINAGE WITH PARTIAL BONE RESECTION performed by Carmen Medrano DPM at [place] [address] OFFICE/OUTPT VISIT,PROCEDURE ONLY Right 8/3/2018    INCISION AND DRAINAGE MULTIPLE AREAS RIGHT FOOT WITH DEBRIDEMENT SOFT TISSUE performed by Carmen Medrano DPM at [facility] OR    RHINOPLASTY Right     leg          Family History   Problem Relation Age of Onset    Cancer Mother     Diabetes Father     Heart Failure Father     Hypertension Father     Cancer Sister        CareTeam (Including outside providers/suppliers regularly involved in providing care):   Patient Care Team:  Delmar Freire DO as PCP - General (Family Medicine)  Delmar Freire DO as PCP - [facility] Empaneled Provider  Jorge Villalobos MD as Consulting Physician (Infectious Diseases)  Hossein Garcia MD as Physician (Vascular Surgery)    Wt Readings from Last 3 Encounters:   08/18/21 245 lb (111.1 kg)   07/08/21 245 lb (111.1 kg)   07/07/21 245 lb (111.1 kg)     Vitals:    09/09/21 1423   BP: 134/70   Pulse: 67   Temp: 97.8 °F (36.6 °C)   SpO2: 98%   Height: 6' 2\" (1.88 m)     Body mass index is 31.46 kg/m². Based upon direct observation of the patient, evaluation of cognition reveals recent and remote memory intact. Physical Exam  Vitals reviewed. Constitutional:       Appearance: He is obese. HENT:      Head: Normocephalic and atraumatic. Mouth/Throat:      Dentition: Abnormal dentition (edentulous upper).    Eyes:      General: No scleral icterus. Conjunctiva/sclera: Conjunctivae normal.      Pupils: Pupils are equal, round, and reactive to light. Neck:      Thyroid: No thyromegaly. Cardiovascular:      Rate and Rhythm: Normal rate and regular rhythm. Heart sounds: Normal heart sounds. No murmur heard. Pulmonary:      Effort: Pulmonary effort is normal.      Breath sounds: Normal breath sounds. No rales. Abdominal:      General: Bowel sounds are decreased. There is no distension. Palpations: Abdomen is soft. Tenderness: There is no abdominal tenderness. Musculoskeletal:         General: Normal range of motion. Cervical back: Neck supple. Legs:       Comments: Left upper extremity PICC line in place. Lymphadenopathy:      Cervical: No cervical adenopathy. Skin:     General: Skin is warm and dry. Findings: No erythema or rash. Neurological:      Mental Status: He is alert and oriented to person, place, and time. Cranial Nerves: No cranial nerve deficit. Psychiatric:         Judgment: Judgment normal.           Patient's complete Health Risk Assessment and screening values have been reviewed and are found in Flowsheets. The following problems were reviewed today and where indicated follow up appointments were made and/or referrals ordered. Positive Risk Factor Screenings with Interventions:         Substance History:  Social History     Tobacco History     Smoking Status  Current Every Day Smoker Smoking Frequency  0.5 packs/day for 7 years (3.5 pk yrs) Smoking Tobacco Type  Cigarettes    Smokeless Tobacco Use  Never Used          Alcohol History     Alcohol Use Status  No          Drug Use     Drug Use Status  No          Sexual Activity     Sexually Active  Not Asked               Alcohol Screening:       A score of 8 or more is associated with harmful or hazardous drinking.  A score of 13 or more in women, and 15 or more in men, is likely to indicate alcohol you need help from others to take care of any of the following? Laundry, housekeeping, banking/finances, shopping, telephone use, food preparation, transportation, or taking medications?: [employer], Housekeeping, Shopping, Food Preparation, Transportation  ADL Interventions:  · Patient declines any further evaluation/treatment for this issue    Personalized Preventive Plan   Current Health Maintenance Status  Immunization History   Administered Date(s) Administered    COVID-19, Alphonso Franklin, PF, 30mcg/0.3mL 02/22/2021, 03/17/2021    Influenza A (S1C4-79) Vaccine PF IM 12/15/2009    Influenza Virus Vaccine 11/02/2017    Influenza, Sadia Memory, 6-35 Months, IM (Fluzone,Afluria) 01/01/2020    Influenza, Quadv, IM, PF (6 mo and older Fluzone, Flulaval, Fluarix, and 3 yrs and older Afluria) 11/02/2017, 10/21/2019, 10/12/2020    Pneumococcal Conjugate 13-valent ([id number]) 02/22/2018    Pneumococcal Polysaccharide ([id number]) 10/21/2019, 01/01/2020        Health Maintenance   Topic Date Due    HIV screen  Never done    Colon cancer screen colonoscopy  Never done    Shingles Vaccine (1 of 2) Never done   [employer] Visit (AWV)  Never done    Flu vaccine (1) 09/01/2021    Diabetic foot exam  12/21/2021 (Originally 10/24/2019)    DTaP/Tdap/Td vaccine (1 - Tdap) 12/21/2021 (Originally [birth year]13/1976)    A1C test (Diabetic or Prediabetic)  11/12/2021    Diabetic microalbuminuria test  08/12/2022    Lipid screen  08/12/2022    Potassium monitoring  08/12/2022    Creatinine monitoring  08/12/2022    Diabetic retinal exam  03/30/2023    Pneumococcal 0-64 years Vaccine (2 of 2 - PPSV23) 01/01/2025    COVID-19 Vaccine  Completed    Hepatitis C screen  Completed    Hepatitis A vaccine  Aged Out    Hib vaccine  Aged Out    Meningococcal (ACWY) vaccine  Aged Out     Recommendations for [employer] Due: see orders and patient instructions/AVS.  .   Recommended screening schedule for the next 5-10 years is provided to the patient in written form: see Patient Instructions/AVS.    Veronica Coulter was seen today for medicare awv. Diagnoses and all orders for this visit:    Controlled type 2 diabetes mellitus with other specified complication, with long-term current use of insulin ([facility]  -     CBC Auto Differential; Future  -     Hepatic Function Panel; Future  -     Basic Metabolic Panel; Future  -     Hemoglobin A1C; Future  -     C-REACTIVE PROTEIN; Future  -     SEDIMENTATION RATE; Future  -     CK; Future    Body mass index (BMI) 31.0-31.9, adult   -     WA Behavior  obesity 15m []    ACP (advance care planning)  -     [place] ADVANCED CARE PLAN FACE TO [facility], [address] [[id number]]  -     WA Behavior  obesity 15m []    Screening for cardiovascular condition  -     WA Behavior  obesity 15m []  -     WA Intens behave ther cardio dx, 15 minutes []    Personal history of tobacco use, presenting hazards to health  -     WA Behavior  obesity 15m []  -     WA TOBACCO USE CESSATION INTERMEDIATE 3-10 MINUTES [63429]    Routine general medical examination at a health care facility  -     WA Behavior  obesity 15m []    Other orders  -     ammonium lactate (LAC-HYDRIN) 12 % lotion; Apply topically as needed. Advance Care Planning   Advanced Care Planning: Discussed the patients choices for care and treatment in case of a health event that adversely affects decision-making abilities. Also discussed the patients long-term treatment options. Reviewed with the patient the [address] [place] [address] Declaration forms  Reviewed the process of designating a competent adult as an Agent (or -in-fact) that could take make health care decisions for the patient if incompetent.  Patient was asked to complete the declaration forms, either acknowledge the forms by a public notary or an eligible witness and provide a signed copy to the practice office. Time spent (minutes): 10     Obesity Counseling: Assessed behavioral health risks and factors affecting choice of behavior. Suggested weight control approaches, including dietary changes behavioral modification and follow up plan. Provided educational and support documentation. Time spent (minutes): 10    Cardiovascular Disease Risk Counseling: Assessed the patient's risk to develop cardiovascular disease and reviewed main risk factors. Reviewed steps to reduce disease risk including:   · Quitting tobacco use, reducing amount smoked, or not starting the habit  · Making healthy food choices  · Being physically active and gradualy increasing activity levels   · Reduce weight and determine a healthy BMI goal  · Monitor blood pressure and treat if higher than 140/90 mmHg  · Maintain blood total cholesterol levels under 5 mmol/l or 190 mg/dl  · Maintain LDL cholesterol levels under 3.0 mmol/l or 115 mg/dl   · Control blood glucose levels  · Consider taking aspirin (75 mg daily), once blood pressure is controlled   Provided a follow up plan. Time spent (minutes): 10    Tobacco Cessation Counseling: Patient advised about behavior change, including information about personal health harms, usage of appropriate cessation measures and benefits of cessation.   Time spent (minutes): 10 Consent 3/Introductory Paragraph: I gave the patient a chance to ask questions they had about the procedure.  Following this I explained the Mohs procedure and consent was obtained. The risks, benefits and alternatives to therapy were discussed in detail. Specifically, the risks of infection, scarring, bleeding, prolonged wound healing, incomplete removal, allergy to anesthesia, nerve injury and recurrence were addressed. Prior to the procedure, the treatment site was clearly identified and confirmed by the patient. All components of Universal Protocol/PAUSE Rule completed.

## 2021-09-15 ENCOUNTER — HOSPITAL ENCOUNTER (OUTPATIENT)
Dept: WOUND CARE | Age: 66
Discharge: HOME | End: 2021-09-15
Payer: COMMERCIAL

## 2021-09-15 VITALS
DIASTOLIC BLOOD PRESSURE: 66 MMHG | HEART RATE: 64 BPM | RESPIRATION RATE: 18 BRPM | SYSTOLIC BLOOD PRESSURE: 148 MMHG | TEMPERATURE: 97.5 F

## 2021-09-15 DIAGNOSIS — L97.112: ICD-10-CM

## 2021-09-15 DIAGNOSIS — T81.32XS: Primary | ICD-10-CM

## 2021-09-15 PROCEDURE — 11042 DBRDMT SUBQ TIS 1ST 20SQCM/<: CPT | Performed by: SURGERY

## 2021-09-15 PROCEDURE — 6370000000 HC RX 637 (ALT 250 FOR IP): Performed by: SURGERY

## 2021-09-15 PROCEDURE — 11042 DBRDMT SUBQ TIS 1ST 20SQCM/<: CPT

## 2021-09-15 RX ORDER — LIDOCAINE HYDROCHLORIDE 20 MG/ML
JELLY TOPICAL ONCE
Status: CANCELLED | OUTPATIENT
Start: 2021-09-15 | End: 2021-09-15

## 2021-09-15 RX ORDER — GENTAMICIN SULFATE 1 MG/G
OINTMENT TOPICAL ONCE
Status: CANCELLED | OUTPATIENT
Start: 2021-09-15 | End: 2021-09-15

## 2021-09-15 RX ORDER — BETAMETHASONE DIPROPIONATE 0.05 %
OINTMENT (GRAM) TOPICAL ONCE
Status: CANCELLED | OUTPATIENT
Start: 2021-09-15 | End: 2021-09-15

## 2021-09-15 RX ORDER — GINSENG 100 MG
CAPSULE ORAL ONCE
Status: CANCELLED | OUTPATIENT
Start: 2021-09-15 | End: 2021-09-15

## 2021-09-15 RX ORDER — LIDOCAINE 50 MG/G
OINTMENT TOPICAL ONCE
Status: CANCELLED | OUTPATIENT
Start: 2021-09-15 | End: 2021-09-15

## 2021-09-15 RX ORDER — LIDOCAINE HYDROCHLORIDE 40 MG/ML
SOLUTION TOPICAL ONCE
Status: CANCELLED | OUTPATIENT
Start: 2021-09-15 | End: 2021-09-15

## 2021-09-15 RX ORDER — BACITRACIN ZINC AND POLYMYXIN B SULFATE 500; 1000 [USP'U]/G; [USP'U]/G
OINTMENT TOPICAL ONCE
Status: CANCELLED | OUTPATIENT
Start: 2021-09-15 | End: 2021-09-15

## 2021-09-15 RX ORDER — LIDOCAINE HYDROCHLORIDE 40 MG/ML
SOLUTION TOPICAL ONCE
Status: COMPLETED | OUTPATIENT
Start: 2021-09-15 | End: 2021-09-15

## 2021-09-15 RX ORDER — BACITRACIN, NEOMYCIN, POLYMYXIN B 400; 3.5; 5 [USP'U]/G; MG/G; [USP'U]/G
OINTMENT TOPICAL ONCE
Status: CANCELLED | OUTPATIENT
Start: 2021-09-15 | End: 2021-09-15

## 2021-09-15 RX ORDER — LIDOCAINE 40 MG/G
CREAM TOPICAL ONCE
Status: CANCELLED | OUTPATIENT
Start: 2021-09-15 | End: 2021-09-15

## 2021-09-15 RX ORDER — CLOBETASOL PROPIONATE 0.5 MG/G
OINTMENT TOPICAL ONCE
Status: CANCELLED | OUTPATIENT
Start: 2021-09-15 | End: 2021-09-15

## 2021-09-15 RX ADMIN — LIDOCAINE HYDROCHLORIDE 10 ML: 40 SOLUTION TOPICAL at 12:02

## 2021-09-15 NOTE — PROGRESS NOTES
Wound Healing Center Followup Visit Note    Referring Physician : Nohelia Sifuentes [address] RECORD NUMBER:  [id number]  AGE: 63 y.o. GENDER: male  : 1955  EPISODE DATE:  9/15/2021    Subjective:     Chief Complaint   Patient presents with    Wound Check     right  amp site      HISTORY of PRESENT ILLNESS RONNY Larry is a 63 y.o. male who presents today in regards to follow up evaluation and treatment of wound/ulcer. That patient's past medical, family and social hx were reviewed and changes were made if present. History of Wound Context:  Pt presents in regards to chronic R above knee amputation wound was since 2020. He had developed postoperatively dehiscence of his wound site which had previously undergone a muscle flap. He was having a packed per home health care. The wound had gotten so small that it was difficult to pack. He is still having drainage significant though. At first visit to wound healing center 20 his wound has not been improving. He was not on abx at initial visit to center.     Previous lower extremity procedures  2018  R AKA for chronic R LE wound   3/20/2020 R groin exploration with iliofemoral artery embolectomy, primary closure of arteriotomy, R deep femoral atery embolectomy   2020 Excisional debridement of infected necrotic  tissue, right groin and right thigh   2020 RIGHT THIGH WOUND DRESSING CHANGE and DEBRIDEMENT   2020 Irrigation and excisional debridement of Right Thigh above knee amputation wound   2020 Irrigation and excisional debridement of the Right thigh above knee amputation wound 25 x45 cm  Excision of prosthetic R LE bypass graft  Ligation of right SFA  Posterior hamstring myocutaneous flap 65 cm flap for open wound of 45 x 25 cm per Dr. Jorge Rivera     20  · Wound itself is small - opened up as difficult to pack  · 5 cm depth - culture done, significant amount of fluid expressed once opened up  · aquacell ag packing  · Discussed potential need for further surgical exploration in future - pt would like to avoid hospital and OR if possible  9/2/20  · Wound much improved - depth less  · Start doxycyline for staph culture  9/9/20  · Depth 5 cm again, fluid seems less  9/23/20  · Tunneling now 8 cm, concern that hittig bone  · Discussed with patient continuing current reigmen unlikely to result in healing  · Would benefit from further surgical debridement, likely revision of amputation site and possible wound vac  · Patient would like to consider options and will let me know next week  9/30/20  · Significant tunneling still  · Will proceed with or in 2 weeks per pt request  10/21/20  · OR 10/14/20  · dakins moistened kerlex  · Discussed with Dr. Kelvin Lee re pain control issues  · Doxycycline 10 days script given  10/28/20  · Pain better controlled  · + Granulation tissue  · Bone less exposed  11/4/20  · Wound health  · Bone still exposed  11/18/20  · Wound continues to be improved  · Less bone exposed  11/25/20  · Bone exposure still an issue  · Will take for surgery - try dermagraft  · If not successful will likely need revision in future  12/9/20  · Bone exposure, wound overall improvd  · Patient did not follow though with testing for surgery  12/17/20  · dermagraft application  38/20/81  · Wound improved, bone still exposed  1/6/21  · Wound improved, bone still exposed  1/13/21  · Bone almost completely covered  1/27/21  · Bone covered, wound improved  2/10/21  · wound improved,depth less  2/24/21  · Wound stable - 3.5 cm depth  3/10/21  · Wound stable 4 cm depth  3/24/21  · Wound stable, same depth not improving - discussed with patient concerning and may need further debridement  3/31/21  · Depth improved 3.5 cm  4/14/21  · Depth improved 3 cm   4/21/21  · Depth improved 2.8 cm  4/28/21  · Depth stable  5/12/21  · Increased pain  · Suspect wound has closed down to small and needs opened up as fluid is being trapped  · Will admit through er  · Or planned for tomorrow  5/12-20/21  · OR debridement, resection of more femur 5/14/21, Debridement and closure 5/18/21 6/2/21  · Most of wound is closed  · Small area laterally that is open and has ~ 2 cm depth - pack with aquacell  6/9/21  · Sutures removed  · Lateral wound 4 cm dept - pack   6/23/21  · Lateral wound stable 4 cm  · Patient states he does want any more aggressive intervention - not much to offer him at this point  · We did discuss importance of keeping wound clean and infection free  · Will continue with dressing changes  · Having a lot of issues phantom pain - on neurontin 800 mg qid  7/7/21  · Overall feeling better  · Depth decreased to 3.5 cm  8/17/21  · Depth slightly improved 3 cm  9/15/21  · Depth much improved 1 cm     Wound/Ulcer Pain Timing/Severity:  Mild-moderate  Quality of pain: aching  Severity:  3 / 10   Modifying Factors: Pain worsens with dressing changes, pressure, debridement  Associated Signs/Symptoms: drainage and pain    Ulcer Identification:  Ulcer Type: arterial, diabetic, pressure and non-healing surgical  Contributing Factors: edema, diabetes, poor glucose control, chronic pressure, decreased mobility, shear force, obesity and arterial insufficiency    Diabetic/Pressure/Non Pressure Ulcers only:  Ulcer: Diabetic ulcer, fat layer exposed    Wound: Wound dehiscence        PAST MEDICAL HISTORY      Diagnosis Date    Acute kidney injury ([address] 5/18/2020    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 ([address] 5/22/2018    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 ([address] 5/21/2018    Cellulitis, scrotum 3/20/2020    Critical lower limb ischemia ([address] 3/20/2020    Diabetes mellitus ([address]     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 2 diabetes mellitus, with fat layer exposed ([address] 5/22/2018    Disruption of closure of muscle or muscle flap, sequela 8/26/2020    DVT, lower extremity (HCC)     right leg     Encounter for dental examination and cleaning with abnormal findings 4/2/2018    Gas gangrene of thigh ([facility] 3/20/2020    History of DVT (deep vein thrombosis) 7/31/2018    Hyperglycemia due to type 2 diabetes mellitus ([facility] 3/20/2020    Hyperlipidemia     Hypertension     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[facility]     Osteomyelitis ([facility] 10/24/2018    PVD (peripheral vascular disease) ([facility]      Past Surgical History:   Procedure Laterality Date    AMPUTATION ABOVE KNEE Right 4/28/2020    DEBRIDEMENT OF AMPUTATION RIGHT ABOVE KNEE performed by Dru Bruner MD at [address] [place] [address] Right 4/30/2020    DEBRIDEMENT OF AMPUTATION RIGHT ABOVE KNEE,  POSS. ABOVE KNEE REVISION, POSS. CLOSURE, POSS.WOUND VAC -- BEN Lamb / Sanam Yeung TO LOOK IN performed by Dru Bruner MD at [place] [address] Right 3/20/2020    RIGHT LOWER EXTREMITY THROMBECTOMY, POSSIBLE ANGIOGRAM, POSSIBLE INTERVENTION, POSSIBLE BYPASS. performed by Dru Bruner MD at [address] Right 4/17/2020    RIGHT LEG DEBRIDEMENT INCISION AND DRAINAGE performed by Brielle Irving MD at [address] Right 4/20/2020    RIGHT THIGH WOUND DRESSING CHANGE; DEBRIDEMENT, removal of muscle performed by Brielle Irving MD at [address] Right 4/21/2020    RIGHT THIGH WOUND DRESSING CHANGE POSSIBLE  DEBRIDEMENT - NEEDS BEN Lamb / JANETTE TO SEE performed by Geraldo Early MD at [address] Right 4/23/2020    RIGHT THIGH WOUND DRESSING CHANGE and DEBRIDEMENT performed by Dru Bruner MD at [address] [place] [address] Right 10/15/2020    DEBRIDEMENT RIGHT ABOVE KNEE AMPUTATION POSS. REVISION POSS. WOUND VAC performed by Dru Bruner MD at [address] Right 12/17/2020    DEBRIDEMENT  RIGHT ABOVE KNEE AMPUTATION WITH POSS. ADVANCED SKIN THERAPY performed by Jean Nunes MD at [address] Right 5/14/2021    DEBRIDEMENT RIGHT ABOVE KNEE AMPUTATION performed by Saira Ellis MD at [address] Right 5/18/2021    DEBRIDEMENT ABOVE THE KNEE AMPUTATION , WITH WOUND VAC APPLICATION performed by Jean Nunes MD at [address] Right 11/1/2018    AMPUTATION ABOVE KNEE RIGHT LEG performed by Jean Nunes MD at [facility] Right 5/24/2018    RIGHT FOOT INCISION AND DRAINAGE WITH PARTIAL BONE RESECTION performed by Rodell Meckel, DPM at [facility] OFFICE/OUTPT VISIT,PROCEDURE ONLY Right 8/3/2018    INCISION AND DRAINAGE MULTIPLE AREAS RIGHT FOOT WITH DEBRIDEMENT SOFT TISSUE performed by Rodell Meckel, DPM at [place] [address] Right     leg      Family History   Problem Relation Age of Onset    Cancer Mother     Diabetes Father     Heart Failure Father     Hypertension Father     Cancer Sister      Social History     Tobacco Use    Smoking status: Current Every Day Smoker     Packs/day: 0.50     Years: 7.00     Pack years: 3.50     Types: Cigarettes    Smokeless tobacco: Never Used   Vaping Use    Vaping Use: Never used   Substance Use Topics    Alcohol use: No    Drug use: No     No Known Allergies  Current Outpatient Medications on File Prior to Encounter   Medication Sig Dispense Refill    mirtazapine (REMERON) 7.5 MG tablet TAKE ONE TABLET BY MOUTH ONCE NIGHTLY 90 tablet 1    amLODIPine (NORVASC) 10 MG tablet Take 1 tablet by mouth daily 90 tablet 0    chlorthalidone (HYGROTON) 25 MG tablet TAKE 1 TABLET BY MOUTH ONE TIME A DAY 90 tablet 0    metoprolol tartrate (LOPRESSOR) 25 MG tablet TAKE ONE TABLET BY MOUTH TWO TIMES DAILY 180 tablet 0    oxyCODONE-acetaminophen (PERCOCET) 7.5-325 MG per tablet Take 1 tablet by mouth every 6 hours as needed for Pain for up to 30 days.  Intended supply: 30 days 120 tablet 0    cilostazol (PLETAL) 50 MG tablet TAKE 1 TABLET BY MOUTH 2 TIMES A  tablet 1    doxycycline hyclate (VIBRAMYCIN) 100 MG capsule Take 1 capsule by mouth 2 times daily 60 capsule 2    hydrALAZINE (APRESOLINE) 25 MG tablet TAKE 1 TABLET BY MOUTH 3 TIMES DAILY. 90 tablet 3    insulin glargine (LANTUS SOLOSTAR) 100 UNIT/ML injection pen Inject 25 Units into the skin nightly (Patient taking differently: Inject 30 Units into the skin nightly 10am and 30pm) 5 pen 3    lactobacillus (CULTURELLE) CAPS capsule Take 2 capsules by mouth daily 60 capsule 1    DULoxetine (CYMBALTA) 60 MG extended release capsule TAKE TWO CAPSULES BY MOUTH EVERY MORNING 180 capsule 1    insulin lispro (HUMALOG) 100 UNIT/ML injection vial Inject 15 Units into the skin 3 times daily (before meals) 15 vial 3    gabapentin (NEURONTIN) 800 MG tablet Take 1 tablet by mouth 4 times daily for 30 days. 120 tablet 5    cloNIDine (CATAPRES) 0.1 MG tablet TAKE ONE TABLET BY MOUTH THREE TIMES DAILY 270 tablet 3    ammonium lactate (LAC-HYDRIN) 12 % lotion Apply topically as needed. 500 g 5    oxyCODONE (OXYCONTIN) 20 MG extended release tablet Take 1 tablet by mouth 2 times daily for 30 days. Intended supply: 30 days 60 tablet 0    Insulin Syringe-Needle U-100 (ULTICARE INSULIN SYRINGE) 31G X 5/16\" 0.3 ML MISC 1 each by Other route 5 times daily 100 each 3    Insulin Pen Needle 29G X 12MM MISC 1 each by Does not apply route daily 100 each 3    mineral oil-hydrophilic petrolatum (AQUAPHOR) ointment Apply topically twice daily dispense 14 ounce jar (Patient taking differently: Apply topically once a week ) 396 g 5    blood glucose test strips (ONETOUCH ULTRA) strip 1 each by Other route 3 times daily As needed.  300 each 1    Handicap Placard MISC by Does not apply route Patient cannot walk 200 ft without stopping to rest.    Expiration 10/21/2024 1 each 0     No current facility-administered medications on file prior to encounter. REVIEW OF SYSTEMS See HPI    Objective:    BP (!) 148/66   Pulse 64   Temp 97.5 °F (36.4 °C) (Temporal)   Resp 18   Wt Readings from Last 3 Encounters:   08/18/21 245 lb (111.1 kg)   07/08/21 245 lb (111.1 kg)   07/07/21 245 lb (111.1 kg)     PHYSICAL EXAM  CONSTITUTIONAL:   Awake, alert, cooperative   EYES:  lids and lashes normal   ENT: external ears and nose without lesions   NECK:  supple, symmetrical, trachea midline   SKIN:  Open wound Present    Assessment:     Problem List Items Addressed This Visit     Disruption of closure of muscle or muscle flap, sequela - Primary (Chronic)    Relevant Orders    Initiate Outpatient Wound Care Protocol    Ischemic ulcer of right thigh with fat layer exposed [facility])    Relevant Orders    Initiate Outpatient Wound Care Protocol          Pre Debridement Measurements:  Are located in the [place]  Documentation Flow Sheet  Post Debridement Measurements:  Wound/Ulcer Descriptions are Pre Debridement except measurements:     Wound 06/02/21 Hip Right #2 (Active)   Wound Image   09/15/21 1156   Wound Etiology Surgical 06/02/21 1124   Dressing Status New dressing applied;Clean;Dry; Intact 06/23/21 1210   Wound Cleansed Cleansed with saline 06/23/21 1210   Dressing/Treatment Alginate 06/23/21 1210   Offloading for Diabetic Foot Ulcers Other (comment) 06/23/21 1210   Wound Length (cm) 0.5 cm 09/15/21 1156   Wound Width (cm) 0.2 cm 09/15/21 1156   Wound Depth (cm) 0.9 cm 09/15/21 1156   Wound Surface Area (cm^2) 0.1 cm^2 09/15/21 1156   Change in Wound Size % (l*w) 74.36 09/15/21 1156   Wound Volume (cm^3) 0.09 cm^3 09/15/21 1156   Wound Healing % 85 09/15/21 1156   Post-Procedure Length (cm) 0.5 cm 09/15/21 1231   Post-Procedure Width (cm) 0.4 cm 09/15/21 1231   Post-Procedure Depth (cm) 1 cm 09/15/21 1231   Post-Procedure Surface Area (cm^2) 0.2 cm^2 09/15/21 1231   Post-Procedure Volume (cm^3) 0.2 cm^3 09/15/21 1231   Distance Tunneling (cm) 4 cm 06/23/21 1158 Tunneling Position ___ O'Clock 12 06/23/21 1153   Undermining Starts ___ O'Clock 12 08/18/21 1229   Undermining Ends___ O'Clock 7 08/18/21 1229   Undermining Maxium Distance (cm)  0.6 @7 08/18/21 1229   Wound Assessment Granulation tissue 09/15/21 1156   Drainage Amount Moderate 09/15/21 1156   Drainage Description Serosanguinous 09/15/21 1156   Odor None 09/15/21 1156   Nadia-wound Assessment Intact 09/15/21 1156   Number of days: 105         Procedure Note  Indications:  Based on my examination of this patient's wound(s)/ulcer(s) today, debridement is required to promote healing and evaluate the wound base. Performed by: Hossein Garcia MD    Consent obtained:  Yes    Time out taken:  Yes    Pain Control: Anesthetic  Anesthetic: 4% Lidocaine Liquid Topical     Debridement:Excisional Debridement    Using curette the wound(s)/ulcer(s) was/were sharply debrided down through and including the removal of epidermis, dermis and subcutaneous tissue. Devitalized Tissue Debrided:  fibrin, biofilm, slough and exudate to stimulate bleeding to promote healing, post debridement good bleeding base and wound edges noted    Wound/Ulcer #: 1    Percent of Wound/Ulcer Debrided: 100%    Total Surface Area Debrided:  0.1sq cm     Estimated Blood Loss:  Minimal  Hemostasis Achieved:  by pressure    Procedural Pain:  4  / 10   Post Procedural Pain:  3 / 10     Response to treatment:  Well tolerated    Plan:   Treatment Note please see attached Discharge Instructions    Written patient dismissal instructions given to patient and signed by patient or POA.          Discharge Instructions       Discharge Instructions                  Visit Discharge/Physician Orders     Discharge condition: Stable     Assessment of pain at discharge:yes     Anesthetic used: 4% lidocaine soln.     Discharge to: Home     Left via:Private automobile     Accompanied by: spouse     ECF/HHA: [facility](note tunneling at 15 of 3.7cm's)     Dressing Orders:RIGHT AKA SITE-Cleanse with normal saline, pack loosely with calcium alginate, dry dressing and secure. Change daily.     Treatment Orders:Eat a diet high in protein and vitamin C. Take a multiple vitamin daily unless contraindicated.      Stop smoking!     [facility] followup visit: 2 weeks _____________________________  (Please note your next appointment above and if you are unable to keep, kindly give a 24 hour notice.  Thank you.)     Physician signature:__________________________  Verba Lanes  If you experience any of the following, please call the [facility] during business hours:     * Increase in Pain  * Temperature over 101  * Increase in drainage from your wound  * Drainage with a foul odor  * Bleeding  * Increase in swelling  * Need for compression bandage changes due to slippage, breakthrough drainage.     If you need medical attention outside of the business hours of the [facility] [address] please contact your PCP or go to the nearest emergency room.                          Electronically signed by Shruti Roth MD

## 2021-09-17 ENCOUNTER — TELEPHONE (OUTPATIENT)
Dept: PRIMARY CARE CLINIC | Age: 66
End: 2021-09-17

## 2021-09-17 DIAGNOSIS — R26.2 AMBULATORY DYSFUNCTION: ICD-10-CM

## 2021-09-17 DIAGNOSIS — Z89.611 ACQUIRED ABSENCE OF RIGHT LEG ABOVE KNEE (HCC): Primary | ICD-10-CM

## 2021-09-17 DIAGNOSIS — E66.9 CLASS 1 OBESITY WITH SERIOUS COMORBIDITY AND BODY MASS INDEX (BMI) OF 31.0 TO 31.9 IN ADULT, UNSPECIFIED OBESITY TYPE: ICD-10-CM

## 2021-09-17 RX ORDER — BLOOD SUGAR DIAGNOSTIC
1 STRIP MISCELLANEOUS 3 TIMES DAILY
Qty: 300 EACH | Refills: 1 | Status: SHIPPED
Start: 2021-09-17 | End: 2022-03-15

## 2021-09-22 DIAGNOSIS — G89.4 CHRONIC PAIN SYNDROME: ICD-10-CM

## 2021-09-22 DIAGNOSIS — T81.49XA POSTOPERATIVE WOUND INFECTION: ICD-10-CM

## 2021-09-22 DIAGNOSIS — T87.9 AKA STUMP COMPLICATION (HCC): ICD-10-CM

## 2021-09-22 RX ORDER — OXYCODONE AND ACETAMINOPHEN 7.5; 325 MG/1; MG/1
1 TABLET ORAL EVERY 6 HOURS PRN
Qty: 120 TABLET | Refills: 0 | Status: SHIPPED
Start: 2021-09-22 | End: 2021-10-19 | Stop reason: SDUPTHER

## 2021-09-28 DIAGNOSIS — I10 HYPERTENSION, UNSPECIFIED TYPE: ICD-10-CM

## 2021-09-28 DIAGNOSIS — Z79.4 CONTROLLED TYPE 2 DIABETES MELLITUS WITH OTHER SPECIFIED COMPLICATION, WITH LONG-TERM CURRENT USE OF INSULIN (HCC): ICD-10-CM

## 2021-09-28 DIAGNOSIS — E11.69 CONTROLLED TYPE 2 DIABETES MELLITUS WITH OTHER SPECIFIED COMPLICATION, WITH LONG-TERM CURRENT USE OF INSULIN (HCC): ICD-10-CM

## 2021-09-28 DIAGNOSIS — Z89.611 S/P AKA (ABOVE KNEE AMPUTATION), RIGHT (HCC): ICD-10-CM

## 2021-09-28 DIAGNOSIS — I73.9 PVD (PERIPHERAL VASCULAR DISEASE) (HCC): ICD-10-CM

## 2021-09-29 ENCOUNTER — HOSPITAL ENCOUNTER (OUTPATIENT)
Dept: WOUND CARE | Age: 66
Discharge: HOME | End: 2021-09-29
Payer: COMMERCIAL

## 2021-09-29 RX ORDER — GABAPENTIN 800 MG/1
800 TABLET ORAL 4 TIMES DAILY
Qty: 120 TABLET | Refills: 2 | Status: SHIPPED
Start: 2021-09-29 | End: 2021-12-03 | Stop reason: SDUPTHER

## 2021-09-29 RX ORDER — HYDRALAZINE HYDROCHLORIDE 25 MG/1
25 TABLET, FILM COATED ORAL 3 TIMES DAILY
Qty: 90 TABLET | Refills: 3 | Status: SHIPPED
Start: 2021-09-29 | End: 2022-01-18

## 2021-09-29 RX ORDER — CLONIDINE HYDROCHLORIDE 0.1 MG/1
TABLET ORAL
Qty: 270 TABLET | Refills: 1 | Status: SHIPPED
Start: 2021-09-29 | End: 2021-10-26

## 2021-09-29 RX ORDER — CHLORTHALIDONE 25 MG/1
TABLET ORAL
Qty: 90 TABLET | Refills: 1 | Status: SHIPPED
Start: 2021-09-29 | End: 2022-02-28 | Stop reason: SDUPTHER

## 2021-09-29 RX ORDER — AMLODIPINE BESYLATE 10 MG/1
10 TABLET ORAL DAILY
Qty: 90 TABLET | Refills: 1 | Status: SHIPPED
Start: 2021-09-29 | End: 2022-02-28 | Stop reason: SDUPTHER

## 2021-10-04 DIAGNOSIS — G89.4 CHRONIC PAIN SYNDROME: ICD-10-CM

## 2021-10-04 RX ORDER — OXYCODONE HCL 20 MG/1
20 TABLET, FILM COATED, EXTENDED RELEASE ORAL 2 TIMES DAILY
Qty: 60 TABLET | Refills: 0 | Status: SHIPPED
Start: 2021-10-04 | End: 2021-11-05 | Stop reason: SDUPTHER

## 2021-10-13 ENCOUNTER — HOSPITAL ENCOUNTER (OUTPATIENT)
Dept: WOUND CARE | Age: 66
Discharge: HOME | End: 2021-10-13
Payer: COMMERCIAL

## 2021-10-13 VITALS
RESPIRATION RATE: 18 BRPM | HEART RATE: 64 BPM | WEIGHT: 245 LBS | HEIGHT: 74 IN | TEMPERATURE: 98.6 F | DIASTOLIC BLOOD PRESSURE: 82 MMHG | SYSTOLIC BLOOD PRESSURE: 158 MMHG | BODY MASS INDEX: 31.44 KG/M2

## 2021-10-13 DIAGNOSIS — L97.112: ICD-10-CM

## 2021-10-13 DIAGNOSIS — T81.32XS: Primary | ICD-10-CM

## 2021-10-13 PROCEDURE — 11042 DBRDMT SUBQ TIS 1ST 20SQCM/<: CPT

## 2021-10-13 PROCEDURE — 11042 DBRDMT SUBQ TIS 1ST 20SQCM/<: CPT | Performed by: SURGERY

## 2021-10-13 PROCEDURE — 6370000000 HC RX 637 (ALT 250 FOR IP): Performed by: SURGERY

## 2021-10-13 RX ORDER — GENTAMICIN SULFATE 1 MG/G
OINTMENT TOPICAL ONCE
Status: CANCELLED | OUTPATIENT
Start: 2021-10-13 | End: 2021-10-13

## 2021-10-13 RX ORDER — LIDOCAINE HYDROCHLORIDE 40 MG/ML
SOLUTION TOPICAL ONCE
Status: COMPLETED | OUTPATIENT
Start: 2021-10-13 | End: 2021-10-13

## 2021-10-13 RX ORDER — BACITRACIN, NEOMYCIN, POLYMYXIN B 400; 3.5; 5 [USP'U]/G; MG/G; [USP'U]/G
OINTMENT TOPICAL ONCE
Status: CANCELLED | OUTPATIENT
Start: 2021-10-13 | End: 2021-10-13

## 2021-10-13 RX ORDER — LIDOCAINE HYDROCHLORIDE 40 MG/ML
SOLUTION TOPICAL ONCE
Status: CANCELLED | OUTPATIENT
Start: 2021-10-13 | End: 2021-10-13

## 2021-10-13 RX ORDER — BACITRACIN ZINC AND POLYMYXIN B SULFATE 500; 1000 [USP'U]/G; [USP'U]/G
OINTMENT TOPICAL ONCE
Status: CANCELLED | OUTPATIENT
Start: 2021-10-13 | End: 2021-10-13

## 2021-10-13 RX ORDER — GINSENG 100 MG
CAPSULE ORAL ONCE
Status: CANCELLED | OUTPATIENT
Start: 2021-10-13 | End: 2021-10-13

## 2021-10-13 RX ORDER — BETAMETHASONE DIPROPIONATE 0.05 %
OINTMENT (GRAM) TOPICAL ONCE
Status: CANCELLED | OUTPATIENT
Start: 2021-10-13 | End: 2021-10-13

## 2021-10-13 RX ORDER — LIDOCAINE HYDROCHLORIDE 20 MG/ML
JELLY TOPICAL ONCE
Status: CANCELLED | OUTPATIENT
Start: 2021-10-13 | End: 2021-10-13

## 2021-10-13 RX ORDER — LIDOCAINE 40 MG/G
CREAM TOPICAL ONCE
Status: CANCELLED | OUTPATIENT
Start: 2021-10-13 | End: 2021-10-13

## 2021-10-13 RX ORDER — LIDOCAINE 50 MG/G
OINTMENT TOPICAL ONCE
Status: CANCELLED | OUTPATIENT
Start: 2021-10-13 | End: 2021-10-13

## 2021-10-13 RX ORDER — CLOBETASOL PROPIONATE 0.5 MG/G
OINTMENT TOPICAL ONCE
Status: CANCELLED | OUTPATIENT
Start: 2021-10-13 | End: 2021-10-13

## 2021-10-13 RX ADMIN — LIDOCAINE HYDROCHLORIDE 10 ML: 40 SOLUTION TOPICAL at 11:53

## 2021-10-13 ASSESSMENT — PAIN SCALES - GENERAL: PAINLEVEL_OUTOF10: 4

## 2021-10-13 ASSESSMENT — PAIN DESCRIPTION - PAIN TYPE: TYPE: CHRONIC PAIN

## 2021-10-13 ASSESSMENT — PAIN DESCRIPTION - DESCRIPTORS: DESCRIPTORS: OTHER (COMMENT)

## 2021-10-13 ASSESSMENT — PAIN DESCRIPTION - FREQUENCY: FREQUENCY: INTERMITTENT

## 2021-10-13 ASSESSMENT — PAIN DESCRIPTION - ONSET: ONSET: ON-GOING

## 2021-10-13 ASSESSMENT — PAIN DESCRIPTION - LOCATION: LOCATION: LEG

## 2021-10-13 ASSESSMENT — PAIN DESCRIPTION - PROGRESSION: CLINICAL_PROGRESSION: NOT CHANGED

## 2021-10-13 ASSESSMENT — PAIN DESCRIPTION - ORIENTATION: ORIENTATION: RIGHT

## 2021-10-13 NOTE — PROGRESS NOTES
Wound Healing Center Followup Visit Note    Referring Physician : Nohelia Norris Str RECORD NUMBER:  [id number]  AGE: 63 y.o. GENDER: male  : 1955  EPISODE DATE:  10/13/2021    Subjective:     Chief Complaint   Patient presents with    Wound Check     right amp site      HISTORY of PRESENT ILLNESS RONNY Pierce is a 63 y.o. male who presents today in regards to follow up evaluation and treatment of wound/ulcer. That patient's past medical, family and social hx were reviewed and changes were made if present. History of Wound Context:  Pt presents in regards to chronic R above knee amputation wound was since 2020. He had developed postoperatively dehiscence of his wound site which had previously undergone a muscle flap. He was having a packed per home health care. The wound had gotten so small that it was difficult to pack. He is still having drainage significant though. At first visit to wound healing center 20 his wound has not been improving. He was not on abx at initial visit to center.     Previous lower extremity procedures  2018  R AKA for chronic R LE wound   3/20/2020 R groin exploration with iliofemoral artery embolectomy, primary closure of arteriotomy, R deep femoral atery embolectomy   2020 Excisional debridement of infected necrotic  tissue, right groin and right thigh   2020 RIGHT THIGH WOUND DRESSING CHANGE and DEBRIDEMENT   2020 Irrigation and excisional debridement of Right Thigh above knee amputation wound   2020 Irrigation and excisional debridement of the Right thigh above knee amputation wound 25 x45 cm  Excision of prosthetic R LE bypass graft  Ligation of right SFA  Posterior hamstring myocutaneous flap 65 cm flap for open wound of 45 x 25 cm per Dr. Cantrell Course     20  · Wound itself is small - opened up as difficult to pack  · 5 cm depth - culture done, significant amount of fluid expressed once opened up  · aquacell ag packing  · Discussed potential need for further surgical exploration in future - pt would like to avoid hospital and OR if possible  9/2/20  · Wound much improved - depth less  · Start doxycyline for staph culture  9/9/20  · Depth 5 cm again, fluid seems less  9/23/20  · Tunneling now 8 cm, concern that hittig bone  · Discussed with patient continuing current reigmen unlikely to result in healing  · Would benefit from further surgical debridement, likely revision of amputation site and possible wound vac  · Patient would like to consider options and will let me know next week  9/30/20  · Significant tunneling still  · Will proceed with or in 2 weeks per pt request  10/21/20  · OR 10/14/20  · dakins moistened kerlex  · Discussed with Dr. Tri wick pain control issues  · Doxycycline 10 days script given  10/28/20  · Pain better controlled  · + Granulation tissue  · Bone less exposed  11/4/20  · Wound health  · Bone still exposed  11/18/20  · Wound continues to be improved  · Less bone exposed  11/25/20  · Bone exposure still an issue  · Will take for surgery - try dermagraft  · If not successful will likely need revision in future  12/9/20  · Bone exposure, wound overall improvd  · Patient did not follow though with testing for surgery  12/17/20  · dermagraft application  26/26/72  · Wound improved, bone still exposed  1/6/21  · Wound improved, bone still exposed  1/13/21  · Bone almost completely covered  1/27/21  · Bone covered, wound improved  2/10/21  · wound improved,depth less  2/24/21  · Wound stable - 3.5 cm depth  3/10/21  · Wound stable 4 cm depth  3/24/21  · Wound stable, same depth not improving - discussed with patient concerning and may need further debridement  3/31/21  · Depth improved 3.5 cm  4/14/21  · Depth improved 3 cm   4/21/21  · Depth improved 2.8 cm  4/28/21  · Depth stable  5/12/21  · Increased pain  · Suspect wound has closed down to small and needs opened up as fluid is being trapped  · Will admit through er  · Or planned for tomorrow  5/12-20/21  · OR debridement, resection of more femur 5/14/21, Debridement and closure 5/18/21 6/2/21  · Most of wound is closed  · Small area laterally that is open and has ~ 2 cm depth - pack with aquacell  6/9/21  · Sutures removed  · Lateral wound 4 cm dept - pack   6/23/21  · Lateral wound stable 4 cm  · Patient states he does want any more aggressive intervention - not much to offer him at this point  · We did discuss importance of keeping wound clean and infection free  · Will continue with dressing changes  · Having a lot of issues phantom pain - on neurontin 800 mg qid  7/7/21  · Overall feeling better  · Depth decreased to 3.5 cm  8/17/21  · Depth slightly improved 3 cm  9/15/21  · Depth much improved 1 cm   10/13/21  · Depth improved 0.7 cm    Wound/Ulcer Pain Timing/Severity:  Mild  Quality of pain: aching  Severity:  1 / 10   Modifying Factors: Pain worsens with dressing changes, pressure, debridement  Associated Signs/Symptoms: drainage and pain    Ulcer Identification:  Ulcer Type: arterial, diabetic, pressure and non-healing surgical  Contributing Factors: edema, diabetes, poor glucose control, chronic pressure, decreased mobility, shear force, obesity and arterial insufficiency    Diabetic/Pressure/Non Pressure Ulcers only:  Ulcer: Diabetic ulcer, fat layer exposed    Wound: Wound dehiscence        PAST MEDICAL HISTORY      Diagnosis Date    Acute kidney injury ([address] 5/18/2020    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 ([address] 5/22/2018    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 ([address] 5/21/2018    Cellulitis, scrotum 3/20/2020    Critical lower limb ischemia ([address] 3/20/2020    Diabetes mellitus ([address]     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 2 diabetes mellitus, with fat layer exposed ([address] 5/22/2018    Disruption of closure of muscle or muscle flap, sequela 8/26/2020    DVT, lower extremity (HCC)     right leg     Encounter for dental examination and cleaning with abnormal findings 4/2/2018    Gas gangrene of thigh ([facility] 3/20/2020    History of DVT (deep vein thrombosis) 7/31/2018    Hyperglycemia due to type 2 diabetes mellitus ([facility] 3/20/2020    Hyperlipidemia     Hypertension     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[facility]     Osteomyelitis ([facility] 10/24/2018    PVD (peripheral vascular disease) ([facility]      Past Surgical History:   Procedure Laterality Date    AMPUTATION ABOVE KNEE Right 4/28/2020    DEBRIDEMENT OF AMPUTATION RIGHT ABOVE KNEE performed by Elen Guzman MD at [facility] Right 4/30/2020    DEBRIDEMENT OF AMPUTATION RIGHT ABOVE KNEE,  POSS. ABOVE KNEE REVISION, POSS. CLOSURE, POSS.WOUND VAC -- BEN Dubon / Nelsy Maria TO LOOK IN performed by Elen Guzman MD at [place] [address] Right 3/20/2020    RIGHT LOWER EXTREMITY THROMBECTOMY, POSSIBLE ANGIOGRAM, POSSIBLE INTERVENTION, POSSIBLE BYPASS. performed by Elen Guzman MD at [address] [place] [address] Right 4/17/2020    RIGHT LEG DEBRIDEMENT INCISION AND DRAINAGE performed by Jerry Hadley MD at [address] [place] [address] Right 4/20/2020    RIGHT THIGH WOUND DRESSING CHANGE; DEBRIDEMENT, removal of muscle performed by Jerry Hadley MD at [address] [place] [address] Right 4/21/2020    RIGHT THIGH WOUND DRESSING CHANGE POSSIBLE  DEBRIDEMENT - NEEDS BEN Dubon / JANETTE TO SEE performed by Vignesh Larry MD at [address] [place] [address] Right 4/23/2020    RIGHT THIGH WOUND DRESSING CHANGE and DEBRIDEMENT performed by Elen Guzman MD at [address] [place] [address] Right 10/15/2020    DEBRIDEMENT RIGHT ABOVE KNEE AMPUTATION POSS.  REVISION POSS. WOUND VAC performed by Elen Guzman MD at [address] [place] [address] Right 12/17/2020    DEBRIDEMENT  RIGHT ABOVE KNEE AMPUTATION WITH POSS. ADVANCED SKIN THERAPY performed by Ori Pena MD at [address] Right 5/14/2021    DEBRIDEMENT RIGHT ABOVE KNEE AMPUTATION performed by Nick Meehan MD at [address] Right 5/18/2021    DEBRIDEMENT ABOVE THE KNEE AMPUTATION , WITH WOUND VAC APPLICATION performed by Ori Pena MD at [address] Right 11/1/2018    AMPUTATION ABOVE KNEE RIGHT LEG performed by Ori Pena MD at [facility] Right 5/24/2018    RIGHT FOOT INCISION AND DRAINAGE WITH PARTIAL BONE RESECTION performed by Gudelia Sarmiento DPM at [facility] OFFICE/OUTPT VISIT,PROCEDURE ONLY Right 8/3/2018    INCISION AND DRAINAGE MULTIPLE AREAS RIGHT FOOT WITH DEBRIDEMENT SOFT TISSUE performed by Gudelia Sarmiento DPM at [facility] OR    RHINOPLASTY Right     leg      Family History   Problem Relation Age of Onset    Cancer Mother     Diabetes Father     Heart Failure Father     Hypertension Father     Cancer Sister      Social History     Tobacco Use    Smoking status: Current Every Day Smoker     Packs/day: 0.50     Years: 7.00     Pack years: 3.50     Types: Cigarettes    Smokeless tobacco: Never Used   Vaping Use    Vaping Use: Never used   Substance Use Topics    Alcohol use: No    Drug use: No     No Known Allergies  Current Outpatient Medications on File Prior to Encounter   Medication Sig Dispense Refill    oxyCODONE (OXYCONTIN) 20 MG extended release tablet Take 1 tablet by mouth 2 times daily for 30 days. Intended supply: 30 days 60 tablet 0    gabapentin (NEURONTIN) 800 MG tablet Take 1 tablet by mouth 4 times daily for 30 days.  120 tablet 2    hydrALAZINE (APRESOLINE) 25 MG tablet Take 1 tablet by mouth 3 times daily 90 tablet 3    cloNIDine (CATAPRES) 0.1 MG tablet TAKE ONE TABLET BY MOUTH THREE TIMES DAILY 270 tablet 1    amLODIPine (NORVASC) 10 MG tablet Take 1 tablet by mouth daily 90 tablet 1    metoprolol tartrate (LOPRESSOR) 25 MG tablet TAKE ONE TABLET BY MOUTH TWO TIMES DAILY 180 tablet 1    chlorthalidone (HYGROTON) 25 MG tablet TAKE 1 TABLET BY MOUTH ONE TIME A DAY 90 tablet 1    oxyCODONE-acetaminophen (PERCOCET) 7.5-325 MG per tablet Take 1 tablet by mouth every 6 hours as needed for Pain for up to 30 days. Intended supply: 30 days 120 tablet 0    mirtazapine (REMERON) 7.5 MG tablet TAKE ONE TABLET BY MOUTH ONCE NIGHTLY 90 tablet 1    cilostazol (PLETAL) 50 MG tablet TAKE 1 TABLET BY MOUTH 2 TIMES A  tablet 1    insulin glargine (LANTUS SOLOSTAR) 100 UNIT/ML injection pen Inject 25 Units into the skin nightly (Patient taking differently: Inject 30 Units into the skin nightly 10am and 30pm) 5 pen 3    Insulin Pen Needle 29G X 12MM MISC 1 each by Does not apply route daily 100 each 3    DULoxetine (CYMBALTA) 60 MG extended release capsule TAKE TWO CAPSULES BY MOUTH EVERY MORNING 180 capsule 1    insulin lispro (HUMALOG) 100 UNIT/ML injection vial Inject 15 Units into the skin 3 times daily (before meals) 15 vial 3    blood glucose test strips (ONETOUCH ULTRA) strip 1 each by Other route 3 times daily As needed. 300 each 1    ammonium lactate (LAC-HYDRIN) 12 % lotion Apply topically as needed. 500 g 5    Insulin Syringe-Needle U-100 (ULTICARE INSULIN SYRINGE) 31G X 5/16\" 0.3 ML MISC 1 each by Other route 5 times daily 100 each 3    lactobacillus (CULTURELLE) CAPS capsule Take 2 capsules by mouth daily 60 capsule 1    mineral oil-hydrophilic petrolatum (AQUAPHOR) ointment Apply topically twice daily dispense 14 ounce jar (Patient taking differently: Apply topically once a week ) 396 g 5    Handicap Placard MISC by Does not apply route Patient cannot walk 200 ft without stopping to rest.    Expiration 10/21/2024 1 each 0     No current facility-administered medications on file prior to encounter.        REVIEW OF SYSTEMS See HPI    Objective:    BP (!) 158/82   Pulse 64   Temp 98.6 °F (37 °C)   Resp 18   Ht 6' 2\" (1.88 m)   Wt 245 lb (111.1 kg)   BMI 31.46 kg/m²   Wt Readings from Last 3 Encounters:   10/13/21 245 lb (111.1 kg)   08/18/21 245 lb (111.1 kg)   07/08/21 245 lb (111.1 kg)     PHYSICAL EXAM  CONSTITUTIONAL:   Awake, alert, cooperative   EYES:  lids and lashes normal   ENT: external ears and nose without lesions   NECK:  supple, symmetrical, trachea midline   SKIN:  Open wound Present    Assessment:     Problem List Items Addressed This Visit     Disruption of closure of muscle or muscle flap, sequela - Primary (Chronic)    Relevant Orders    Initiate Outpatient Wound Care Protocol    Ischemic ulcer of right thigh with fat layer exposed [facility])    Relevant Orders    Initiate Outpatient Wound Care Protocol          Pre Debridement Measurements:  Are located in the [place]  Documentation Flow Sheet  Post Debridement Measurements:  Wound/Ulcer Descriptions are Pre Debridement except measurements:     Wound 06/02/21 Hip Right #2 (Active)   Wound Image   10/13/21 1151   Wound Etiology Surgical 06/02/21 1124   Dressing Status New dressing applied 09/15/21 1231   Wound Cleansed Cleansed with saline 09/15/21 1231   Dressing/Treatment Alginate;Dry dressing 09/15/21 1231   Offloading for Diabetic Foot Ulcers No offloading required 09/15/21 1231   Wound Length (cm) 0.4 cm 10/13/21 1151   Wound Width (cm) 0.2 cm 10/13/21 1151   Wound Depth (cm) 0.9 cm 10/13/21 1151   Wound Surface Area (cm^2) 0.08 cm^2 10/13/21 1151   Change in Wound Size % (l*w) 79.49 10/13/21 1151   Wound Volume (cm^3) 0.072 cm^3 10/13/21 1151   Wound Healing % 88 10/13/21 1151   Post-Procedure Length (cm) 0.5 cm 10/13/21 1239   Post-Procedure Width (cm) 0.2 cm 10/13/21 1239   Post-Procedure Depth (cm) 0.9 cm 10/13/21 1239   Post-Procedure Surface Area (cm^2) 0.1 cm^2 10/13/21 1239   Post-Procedure Volume (cm^3) 0.09 cm^3 10/13/21 1239   Distance Tunneling (cm) 4 cm 06/23/21 1153   Tunneling Position ___ with normal saline, pack loosely with calcium alginate, dry dressing and secure. Change daily.     Treatment Orders:Eat a diet high in protein and vitamin C. Take a multiple vitamin daily unless contraindicated.      Stop smoking!     [facility] followup visit: 2 weeks _____________________________  (Please note your next appointment above and if you are unable to keep, kindly give a 24 hour notice.  Thank you.)     Physician signature:__________________________  Mary Madrigla  If you experience any of the following, please call the [address] during business hours:     * Increase in Pain  * Temperature over 101  * Increase in drainage from your wound  * Drainage with a foul odor  * Bleeding  * Increase in swelling  * Need for compression bandage changes due to slippage, breakthrough drainage.     If you need medical attention outside of the business hours of the [address] please contact your PCP or go to the nearest emergency room.                                   Electronically signed by Elen Guzman MD

## 2021-10-13 NOTE — PLAN OF CARE
Problem: Pain:  Goal: Pain level will decrease  Description: Pain level will decrease  Outcome: Ongoing  Goal: Control of acute pain  Description: Control of acute pain  Outcome: Ongoing  Goal: Control of chronic pain  Description: Control of chronic pain  Outcome: Ongoing     Problem: Wound:  Goal: Will show signs of wound healing; wound closure and no evidence of infection  Description: Will show signs of wound healing; wound closure and no evidence of infection  Outcome: Ongoing     Problem: Arterial:  Goal: Optimize blood flow for wound healing  Description: Optimize blood flow for wound healing  Outcome: Met This Shift     Problem: Smoking cessation:  Goal: Ability to formulate a plan to maintain a tobacco-free life will be supported  Description: Ability to formulate a plan to maintain a tobacco-free life will be supported  Outcome: Ongoing     Problem: Weight control:  Goal: Ability to maintain an optimal weight for height and age will be supported  Description: Ability to maintain an optimal weight for height and age will be supported  Outcome: Met This Shift

## 2021-10-19 DIAGNOSIS — T81.49XA POSTOPERATIVE WOUND INFECTION: ICD-10-CM

## 2021-10-19 DIAGNOSIS — G89.4 CHRONIC PAIN SYNDROME: ICD-10-CM

## 2021-10-19 DIAGNOSIS — T87.9 AKA STUMP COMPLICATION (HCC): ICD-10-CM

## 2021-10-19 RX ORDER — OXYCODONE AND ACETAMINOPHEN 7.5; 325 MG/1; MG/1
1 TABLET ORAL EVERY 6 HOURS PRN
Qty: 120 TABLET | Refills: 0 | Status: SHIPPED
Start: 2021-10-19 | End: 2021-11-11 | Stop reason: SDUPTHER

## 2021-10-20 RX ORDER — LANCETS 33 GAUGE
EACH MISCELLANEOUS
Qty: 200 EACH | Refills: 5 | Status: SHIPPED
Start: 2021-10-20 | End: 2022-10-20 | Stop reason: SDUPTHER

## 2021-10-26 RX ORDER — DULOXETIN HYDROCHLORIDE 60 MG/1
CAPSULE, DELAYED RELEASE ORAL
Qty: 180 CAPSULE | Refills: 1 | Status: SHIPPED
Start: 2021-10-26 | End: 2022-02-28 | Stop reason: SDUPTHER

## 2021-10-26 RX ORDER — CLONIDINE HYDROCHLORIDE 0.1 MG/1
TABLET ORAL
Qty: 270 TABLET | Refills: 3 | Status: SHIPPED
Start: 2021-10-26 | End: 2021-12-03 | Stop reason: SDUPTHER

## 2021-10-27 ENCOUNTER — HOSPITAL ENCOUNTER (OUTPATIENT)
Dept: WOUND CARE | Age: 66
Discharge: HOME | End: 2021-10-27
Payer: COMMERCIAL

## 2021-10-27 VITALS
RESPIRATION RATE: 20 BRPM | HEART RATE: 73 BPM | TEMPERATURE: 96.8 F | WEIGHT: 245 LBS | BODY MASS INDEX: 31.46 KG/M2 | DIASTOLIC BLOOD PRESSURE: 74 MMHG | SYSTOLIC BLOOD PRESSURE: 142 MMHG

## 2021-10-27 DIAGNOSIS — E11.65 UNCONTROLLED TYPE 2 DIABETES MELLITUS WITH HYPERGLYCEMIA (HCC): ICD-10-CM

## 2021-10-27 DIAGNOSIS — T81.32XS: Primary | ICD-10-CM

## 2021-10-27 DIAGNOSIS — L97.112: ICD-10-CM

## 2021-10-27 PROCEDURE — 99213 OFFICE O/P EST LOW 20 MIN: CPT | Performed by: SURGERY

## 2021-10-27 PROCEDURE — 99212 OFFICE O/P EST SF 10 MIN: CPT

## 2021-10-27 PROCEDURE — 6370000000 HC RX 637 (ALT 250 FOR IP): Performed by: SURGERY

## 2021-10-27 RX ORDER — LIDOCAINE HYDROCHLORIDE 20 MG/ML
JELLY TOPICAL ONCE
Status: CANCELLED | OUTPATIENT
Start: 2021-10-27 | End: 2021-10-27

## 2021-10-27 RX ORDER — INSULIN GLARGINE 100 [IU]/ML
INJECTION, SOLUTION SUBCUTANEOUS
Qty: 5 PEN | Refills: 5 | Status: SHIPPED
Start: 2021-10-27 | End: 2021-12-03 | Stop reason: SDUPTHER

## 2021-10-27 RX ORDER — GINSENG 100 MG
CAPSULE ORAL ONCE
Status: CANCELLED | OUTPATIENT
Start: 2021-10-27 | End: 2021-10-27

## 2021-10-27 RX ORDER — LIDOCAINE 50 MG/G
OINTMENT TOPICAL ONCE
Status: CANCELLED | OUTPATIENT
Start: 2021-10-27 | End: 2021-10-27

## 2021-10-27 RX ORDER — GENTAMICIN SULFATE 1 MG/G
OINTMENT TOPICAL ONCE
Status: CANCELLED | OUTPATIENT
Start: 2021-10-27 | End: 2021-10-27

## 2021-10-27 RX ORDER — BACITRACIN ZINC AND POLYMYXIN B SULFATE 500; 1000 [USP'U]/G; [USP'U]/G
OINTMENT TOPICAL ONCE
Status: CANCELLED | OUTPATIENT
Start: 2021-10-27 | End: 2021-10-27

## 2021-10-27 RX ORDER — CLOBETASOL PROPIONATE 0.5 MG/G
OINTMENT TOPICAL ONCE
Status: CANCELLED | OUTPATIENT
Start: 2021-10-27 | End: 2021-10-27

## 2021-10-27 RX ORDER — BETAMETHASONE DIPROPIONATE 0.05 %
OINTMENT (GRAM) TOPICAL ONCE
Status: CANCELLED | OUTPATIENT
Start: 2021-10-27 | End: 2021-10-27

## 2021-10-27 RX ORDER — LIDOCAINE HYDROCHLORIDE 40 MG/ML
SOLUTION TOPICAL ONCE
Status: COMPLETED | OUTPATIENT
Start: 2021-10-27 | End: 2021-10-27

## 2021-10-27 RX ORDER — LIDOCAINE 40 MG/G
CREAM TOPICAL ONCE
Status: CANCELLED | OUTPATIENT
Start: 2021-10-27 | End: 2021-10-27

## 2021-10-27 RX ORDER — LIDOCAINE HYDROCHLORIDE 40 MG/ML
SOLUTION TOPICAL ONCE
Status: CANCELLED | OUTPATIENT
Start: 2021-10-27 | End: 2021-10-27

## 2021-10-27 RX ORDER — BACITRACIN, NEOMYCIN, POLYMYXIN B 400; 3.5; 5 [USP'U]/G; MG/G; [USP'U]/G
OINTMENT TOPICAL ONCE
Status: CANCELLED | OUTPATIENT
Start: 2021-10-27 | End: 2021-10-27

## 2021-10-27 RX ADMIN — LIDOCAINE HYDROCHLORIDE 10 ML: 40 SOLUTION TOPICAL at 11:57

## 2021-10-27 ASSESSMENT — PAIN - FUNCTIONAL ASSESSMENT: PAIN_FUNCTIONAL_ASSESSMENT: PREVENTS OR INTERFERES SOME ACTIVE ACTIVITIES AND ADLS

## 2021-10-27 ASSESSMENT — PAIN DESCRIPTION - PROGRESSION: CLINICAL_PROGRESSION: NOT CHANGED

## 2021-10-27 ASSESSMENT — PAIN DESCRIPTION - LOCATION: LOCATION: LEG

## 2021-10-27 ASSESSMENT — PAIN DESCRIPTION - FREQUENCY: FREQUENCY: INTERMITTENT

## 2021-10-27 ASSESSMENT — PAIN DESCRIPTION - PAIN TYPE: TYPE: CHRONIC PAIN

## 2021-10-27 ASSESSMENT — PAIN SCALES - GENERAL: PAINLEVEL_OUTOF10: 5

## 2021-10-27 ASSESSMENT — PAIN DESCRIPTION - ORIENTATION: ORIENTATION: RIGHT

## 2021-10-27 ASSESSMENT — PAIN DESCRIPTION - DESCRIPTORS: DESCRIPTORS: OTHER (COMMENT)

## 2021-10-27 ASSESSMENT — PAIN DESCRIPTION - ONSET: ONSET: ON-GOING

## 2021-10-27 NOTE — DISCHARGE INSTR - COC
POSS. WOUND VAC performed by Amaryllis Prader, MD at [address] Right 12/17/2020    DEBRIDEMENT  RIGHT ABOVE KNEE AMPUTATION WITH POSS.  ADVANCED SKIN THERAPY performed by Amaryllis Prader, MD at [address] Right 5/14/2021    DEBRIDEMENT RIGHT ABOVE KNEE AMPUTATION performed by Connie Chilel MD at [address] Right 5/18/2021    DEBRIDEMENT ABOVE THE KNEE AMPUTATION , WITH WOUND VAC APPLICATION performed by Amaryllis Prader, MD at [address] [facility] Right 11/1/2018    AMPUTATION ABOVE KNEE RIGHT LEG performed by Amaryllis Prader, MD at [facility] Right 5/24/2018    RIGHT FOOT INCISION AND DRAINAGE WITH PARTIAL BONE RESECTION performed by Luis Agustin DPM at [address] [facility] OFFICE/OUTPT VISIT,PROCEDURE ONLY Right 8/3/2018    INCISION AND DRAINAGE MULTIPLE AREAS RIGHT FOOT WITH DEBRIDEMENT SOFT TISSUE performed by Luis Agustin DPM at [facility] Right     leg        Immunization History:   Immunization History   Administered Date(s) Administered    COVID-19, Pfizer, PF, 30mcg/0.3mL 02/22/2021, 03/17/2021    Influenza A ([id number]) Vaccine PF IM 12/15/2009    Influenza Virus Vaccine 11/02/2017    Influenza, Ashely Magallon, 6-35 Months, IM (Fluzone,Afluria) 01/01/2020    Influenza, Ashely Magallon, IM, PF (6 mo and older Fluzone, Flulaval, Fluarix, and 3 yrs and older Afluria) 11/02/2017, 10/21/2019, 10/12/2020    Pneumococcal Conjugate 13-valent (Ogugldm15) 02/22/2018    Pneumococcal Polysaccharide (Ciinohbsj22) 10/21/2019, 01/01/2020       Active Problems:  Patient Active Problem List   Diagnosis Code    DM II (diabetes mellitus, type II), controlled ([address] E11.9    HTN (hypertension) I10    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 ([address] I70.65    Coagulopathy (HCC) D68.9    Moderate protein-calorie malnutrition (HCC) E44.0    PVD (peripheral vascular disease) ([facility] I73.9    Leukocytosis D72.829    Stage 3 chronic kidney disease ([facility]) N18.30    Tobacco dependence F17.200    HLD (hyperlipidemia) E78.5    Osteomyelitis of right lower limb ([facility]) M86.9    S/P AKA (above knee amputation), right ([facility] Z89.611    History of vascular surgery Z98.890    Occlusion of common femoral artery ([facility]) I70.209    Critical lower limb ischemia ([facility]) I70.229    Vascular occlusion I99.8    Wound infection T14. 8XXA, L08.9    Postoperative wound infection T81.49XA    Ischemic ulcer of right thigh with fat layer exposed ([facility] L97.112    Ischemic ulcer of right thigh with necrosis of muscle ([facility] L97.113    Above knee amputation of right lower extremity ([facility]) H87.468K    Postoperative pain G89.18    Disruption of closure of muscle or muscle flap, sequela T81.32XS    Chronic pain syndrome G89.4    AKA stump complication ([facility]) U45.2    Elevated sedimentation rate R70.0    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[facility]) Z89.611    Major depressive disorder, single episode, unspecified F32.9    Muscle weakness (generalized) M62.81    Obstructive sleep apnea (adult) (pediatric) G47.33       Isolation/Infection:   Isolation          No Isolation        Patient Infection Status     Infection Onset Added Last Indicated Last Indicated By Review Planned Expiration Resolved Resolved By    None active    Resolved    MRSA 12/17/20 12/20/20 05/12/21 Culture, Wound   05/19/21 Reece Norris RN    Contained within wound vac.   Wound 5/12/21  Surgical culture 12/17/20    COVID-19 Rule Out 12/10/20 12/10/20 12/10/20 Covid-19 Ambulatory (Ordered)   12/11/20 Rule-Out Test Resulted    MRSA 10/15/20 10/17/20 10/15/20 Culture, Surgical   11/30/20 Ryan Odom RN    Wound-right knee- is contained 11/30/20 per July  Wound-Tissue 10/15/20    MRSA 08/26/20 08/28/20 08/26/20 Culture, Wound   10/13/20 Reece Norris RN    Right AKA amp site 8/26/20    MRSA 06/14/20 06/16/20 06/14/20 Culture, Wound   20 Hosea Grover RN    Discontinue isolation per ID 20  Wound-thigh 20    COVID-19 Rule Out 20 Covid-19 Ambulatory (Ordered)   20 Rule-Out Test Resulted    C-diff Rule Out 20 Clostridium difficile EIA (Ordered)   20 Rule-Out Test Resulted    ESBL (Extended Spectrum Beta Lactamase)  20 Eufemia Riojas RN   20 Eufemia Riojas RN    Klebsiella oxytoca wound-right leg 20          Nurse Assessment:  Last Vital Signs: BP (!) 142/74   Pulse 73   Temp 96.8 °F (36 °C) (Tympanic)   Resp 20   Wt 245 lb (111.1 kg)   BMI 31.46 kg/m²     Last documented pain score (0-10 scale): Pain Level: 5  Last Weight:   Wt Readings from Last 1 Encounters:   10/27/21 245 lb (111.1 kg)     Mental Status:  {IP PT MENTAL STATUS:}    IV Access:  { RONY IV ACCESS:737900119}    Nursing Mobility/ADLs:  Walking   {CHP DME ZQAM:983488071}  Transfer  {CHP DME RPRY:395079448}  Bathing  {CHP DME ISGZ:169353660}  Dressing  {CHP DME KXKU:796391919}  Toileting  {CHP DME CDKX:099657054}  Feeding  {CHP DME XLE}  Med Admin  {CHP DME CKOU:083462761}  Med Delivery   { RONY MED Delivery:082963173}    Wound Care Documentation and Therapy:  Wound 21 Hip Right #2 (Active)   Wound Image   10/27/21 1152   Wound Etiology Surgical 21 1124   Dressing Status New dressing applied 09/15/21 1231   Wound Cleansed Cleansed with saline 09/15/21 1231   Dressing/Treatment Alginate;Dry dressing 09/15/21 1231   Offloading for Diabetic Foot Ulcers No offloading required 09/15/21 1231   Wound Length (cm) 0 cm 10/27/21 1152   Wound Width (cm) 0 cm 10/27/21 1152   Wound Depth (cm) 0 cm 10/27/21 1152   Wound Surface Area (cm^2) 0 cm^2 10/27/21 1152   Change in Wound Size % (l*w) 100 10/27/21 1152   Wound Volume (cm^3) 0 cm^3 10/27/21 1152   Wound Healing % 100 10/27/21 1152   Post-Procedure Length (cm) 0 cm 10/27/21 1232 Post-Procedure Width (cm) 0 cm 10/27/21 1232   Post-Procedure Depth (cm) 0 cm 10/27/21 1232   Post-Procedure Surface Area (cm^2) 0 cm^2 10/27/21 1232   Post-Procedure Volume (cm^3) 0 cm^3 10/27/21 1232   Distance Tunneling (cm) 4 cm 21 1153   Tunneling Position ___ O'Clock 12 21 1153   Undermining Starts ___ O'Clock 12 21 1229   Undermining Ends___ O'Clock 7 21 1229   Undermining Maxium Distance (cm)  0.6 @7 21 1229   Wound Assessment Pink/red 10/13/21 1151   Drainage Amount Moderate 10/13/21 1151   Drainage Description Serosanguinous 10/13/21 1151   Odor None 10/13/21 1151   Nadia-wound Assessment Intact 10/27/21 1152   Number of days: 147        Elimination:  Continence:   · Bowel: {YES / HAY:76306}  · Bladder: {YES / TD:27590}  Urinary Catheter: {Urinary Catheter:525557293}   Colostomy/Ileostomy/Ileal Conduit: {YES / QH:35898}       Date of Last BM: ***  No intake or output data in the 24 hours ending 10/27/21 1232  No intake/output data recorded.     Safety Concerns:     [employer] Safety Concerns:786754922}    Impairments/Disabilities:      [employer] Impairments/Disabilities:348557467}    Nutrition Therapy:  Current Nutrition Therapy:   [employer] Diet List:299212415}    Routes of Feeding: {CHP DME Other Feedings:348504071}  Liquids: {Slp liquid thickness:68715}  Daily Fluid Restriction: {CHP DME Yes amt example:236803354}  Last Modified Barium Swallow with Video (Video Swallowing Test): {Done Not Done TFYS:371052377}    Treatments at the Time of Hospital Discharge:   Respiratory Treatments: ***  Oxygen Therapy:  {Therapy; copd oxygen:52287}  Ventilator:    { CC Vent ZVV}    Rehab Therapies: {THERAPEUTIC INTERVENTION:2824986853}  Weight Bearing Status/Restrictions: [address] [employer] [facility] Weight Bearin}  Other Medical Equipment (for information only, NOT a DME order):  {EQUIPMENT:242031728}  Other Treatments: ***    Patient's personal belongings (please select all that are sent with patient):  {CHP DME Belongings:028207267}    RN SIGNATURE:  {Esignature:965976164}    CASE MANAGEMENT/SOCIAL WORK SECTION    Inpatient Status Date: ***    Readmission Risk Assessment Score:  Readmission Risk              Risk of Unplanned Readmission:  0           Discharging to Facility/ Agency   · Name:   · Address:  · Phone:  · Fax:    Dialysis Facility (if applicable)   · Name:  · Address:  · Dialysis Schedule:  · Phone:  · Fax:    / signature: {Esignature:908924099}    PHYSICIAN SECTION    Prognosis: {Prognosis:7873980619}    Condition at Discharge: [address] [place] [address] Patient Condition:830750546}    Rehab Potential (if transferring to Rehab): {Prognosis:2081818889}    Recommended Labs or Other Treatments After Discharge: ***    Physician Certification: I certify the above information and transfer of Remy Reading  is necessary for the continuing treatment of the diagnosis listed and that he requires {Admit to Appropriate Level of Care:31723} for {GREATER/LESS:731369227} 30 days.      Update Admission H&P: {CHP DME Changes in [facility]:502669935}    PHYSICIAN SIGNATURE:  {Esignature:211482731}

## 2021-10-27 NOTE — TELEPHONE ENCOUNTER
Name of Medication(s) Requested:  Lantus 10 units am, 30 units pm    Pharmacy Requested:   [facility]    Medication(s) pended? [x] Yes  [] No    Last Appointment:  9/9/2021    Future appts:  Future Appointments   Date Time Provider Jason Fam   11/22/2021  1:30 PM Altamease Ohm, APRN - CNP [facility]   12/13/2021  3:00 PM Juanito Kline, DO [facility]          Does patient need call back?   [] Yes  [x] No

## 2021-10-27 NOTE — PROGRESS NOTES
Wound Healing Center Followup Visit Note    Referring Physician : Nohelia Humphreysaureliosunday [address] RECORD NUMBER:  [id number]  AGE: 63 y.o. GENDER: male  : 1955  EPISODE DATE:  10/27/2021    Subjective:     Chief Complaint   Patient presents with    Wound Check     Right leg      HISTORY of PRESENT ILLNESS RONNY Jackson is a 63 y.o. male who presents today in regards to follow up evaluation and treatment of wound/ulcer. That patient's past medical, family and social hx were reviewed and changes were made if present. History of Wound Context:  Pt presents in regards to chronic R above knee amputation wound was since 2020. He had developed postoperatively dehiscence of his wound site which had previously undergone a muscle flap. He was having a packed per home health care. The wound had gotten so small that it was difficult to pack. He is still having drainage significant though. At first visit to wound healing center 20 his wound has not been improving. He was not on abx at initial visit to center.     Previous lower extremity procedures  2018  R AKA for chronic R LE wound   3/20/2020 R groin exploration with iliofemoral artery embolectomy, primary closure of arteriotomy, R deep femoral atery embolectomy   2020 Excisional debridement of infected necrotic  tissue, right groin and right thigh   2020 RIGHT THIGH WOUND DRESSING CHANGE and DEBRIDEMENT   2020 Irrigation and excisional debridement of Right Thigh above knee amputation wound   2020 Irrigation and excisional debridement of the Right thigh above knee amputation wound 25 x45 cm  Excision of prosthetic R LE bypass graft  Ligation of right SFA  Posterior hamstring myocutaneous flap 65 cm flap for open wound of 45 x 25 cm per Dr. Leigh Null     20  · Wound itself is small - opened up as difficult to pack  · 5 cm depth - culture done, significant amount of fluid expressed once opened up  · aquacell ag packing  · Discussed potential need for further surgical exploration in future - pt would like to avoid hospital and OR if possible  9/2/20  · Wound much improved - depth less  · Start doxycyline for staph culture  9/9/20  · Depth 5 cm again, fluid seems less  9/23/20  · Tunneling now 8 cm, concern that hittig bone  · Discussed with patient continuing current reigmen unlikely to result in healing  · Would benefit from further surgical debridement, likely revision of amputation site and possible wound vac  · Patient would like to consider options and will let me know next week  9/30/20  · Significant tunneling still  · Will proceed with or in 2 weeks per pt request  10/21/20  · OR 10/14/20  · dakins moistened kerlex  · Discussed with Dr. Kandace Lucas re pain control issues  · Doxycycline 10 days script given  10/28/20  · Pain better controlled  · + Granulation tissue  · Bone less exposed  11/4/20  · Wound health  · Bone still exposed  11/18/20  · Wound continues to be improved  · Less bone exposed  11/25/20  · Bone exposure still an issue  · Will take for surgery - try dermagraft  · If not successful will likely need revision in future  12/9/20  · Bone exposure, wound overall improvd  · Patient did not follow though with testing for surgery  12/17/20  · dermagraft application  16/57/63  · Wound improved, bone still exposed  1/6/21  · Wound improved, bone still exposed  1/13/21  · Bone almost completely covered  1/27/21  · Bone covered, wound improved  2/10/21  · wound improved,depth less  2/24/21  · Wound stable - 3.5 cm depth  3/10/21  · Wound stable 4 cm depth  3/24/21  · Wound stable, same depth not improving - discussed with patient concerning and may need further debridement  3/31/21  · Depth improved 3.5 cm  4/14/21  · Depth improved 3 cm   4/21/21  · Depth improved 2.8 cm  4/28/21  · Depth stable  5/12/21  · Increased pain  · Suspect wound has closed down to small and needs opened up as fluid is being trapped  · Will admit through er  · Or planned for tomorrow  5/12-20/21  · OR debridement, resection of more femur 5/14/21, Debridement and closure 5/18/21 6/2/21  · Most of wound is closed  · Small area laterally that is open and has ~ 2 cm depth - pack with aquacell  6/9/21  · Sutures removed  · Lateral wound 4 cm dept - pack   6/23/21  · Lateral wound stable 4 cm  · Patient states he does want any more aggressive intervention - not much to offer him at this point  · We did discuss importance of keeping wound clean and infection free  · Will continue with dressing changes  · Having a lot of issues phantom pain - on neurontin 800 mg qid  7/7/21  · Overall feeling better  · Depth decreased to 3.5 cm  8/17/21  · Depth slightly improved 3 cm  9/15/21  · Depth much improved 1 cm   10/13/21  · Depth improved 0.7 cm  10/27/21  · Wound healed  · Spoke with Nas Swan from [facility] for prosthetic    Wound/Ulcer Pain Timing/Severity:  Mild  Quality of pain: aching  Severity:  1 / 10   Modifying Factors: Pain worsens with dressing changes, pressure, debridement  Associated Signs/Symptoms: drainage and pain    Ulcer Identification:  Ulcer Type: arterial, diabetic, pressure and non-healing surgical  Contributing Factors: edema, diabetes, poor glucose control, chronic pressure, decreased mobility, shear force, obesity and arterial insufficiency    Diabetic/Pressure/Non Pressure Ulcers only:  Ulcer: Diabetic ulcer, fat layer exposed    Wound: Wound dehiscence        PAST MEDICAL HISTORY      Diagnosis Date    Acute kidney injury ([address] 5/18/2020    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 ([address] 5/22/2018    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 ([address] 5/21/2018    Cellulitis, scrotum 3/20/2020    Critical lower limb ischemia ([address] 3/20/2020    Diabetes mellitus ([address]     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 2 diabetes mellitus, with fat layer exposed ([facility] 5/22/2018    Disruption of closure of muscle or muscle flap, sequela 8/26/2020    DVT, lower extremity (HCC)     right leg     Encounter for dental examination and cleaning with abnormal findings 4/2/2018    Gas gangrene of thigh ([facility] 3/20/2020    History of DVT (deep vein thrombosis) 7/31/2018    Hyperglycemia due to type 2 diabetes mellitus ([facility] 3/20/2020    Hyperlipidemia     Hypertension     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[facility]     Osteomyelitis ([facility] 10/24/2018    PVD (peripheral vascular disease) ([facility]      Past Surgical History:   Procedure Laterality Date    AMPUTATION ABOVE KNEE Right 4/28/2020    DEBRIDEMENT OF AMPUTATION RIGHT ABOVE KNEE performed by Marah Salazar MD at [address] [place] [address] Right 4/30/2020    DEBRIDEMENT OF AMPUTATION RIGHT ABOVE KNEE,  POSS. ABOVE KNEE REVISION, POSS. CLOSURE, POSS.WOUND VAC -- BEN Shaver / Javier Rossi TO LOOK IN performed by Marah Salazar MD at [place] [address] Right 3/20/2020    RIGHT LOWER EXTREMITY THROMBECTOMY, POSSIBLE ANGIOGRAM, POSSIBLE INTERVENTION, POSSIBLE BYPASS. performed by Marah Salazar MD at [address] [place] [address] Right 4/17/2020    RIGHT LEG DEBRIDEMENT INCISION AND DRAINAGE performed by Vida Sloan MD at [place] [address] Right 4/20/2020    RIGHT THIGH WOUND DRESSING CHANGE; DEBRIDEMENT, removal of muscle performed by Vida Sloan MD at [place] [address] Right 4/21/2020    RIGHT THIGH WOUND DRESSING CHANGE POSSIBLE  DEBRIDEMENT - NEEDS BEN Shaver / JANETTE RON SEE performed by Deepa Be MD at [facility] Right 4/23/2020    RIGHT THIGH WOUND DRESSING CHANGE and DEBRIDEMENT performed by Marah Salazar MD at [facility] Right 10/15/2020    DEBRIDEMENT RIGHT ABOVE KNEE AMPUTATION POSS.  REVISION POSS. WOUND VAC performed by Marah Salazar MD at [facility] OR    LEG SURGERY Right 12/17/2020    DEBRIDEMENT  RIGHT ABOVE KNEE AMPUTATION WITH POSS.  ADVANCED SKIN THERAPY performed by Luis Gong MD at [address] Right 5/14/2021    DEBRIDEMENT RIGHT ABOVE KNEE AMPUTATION performed by Evy Clarke MD at [address] Right 5/18/2021    DEBRIDEMENT ABOVE THE KNEE AMPUTATION , WITH WOUND VAC APPLICATION performed by Luis Gong MD at [facility] Right 11/1/2018    AMPUTATION ABOVE KNEE RIGHT LEG performed by Luis Gong MD at [facility] Right 5/24/2018    RIGHT FOOT INCISION AND DRAINAGE WITH PARTIAL BONE RESECTION performed by Radha Randle DPM at [address] [place] [address] OFFICE/OUTPT VISIT,PROCEDURE ONLY Right 8/3/2018    INCISION AND DRAINAGE MULTIPLE AREAS RIGHT FOOT WITH DEBRIDEMENT SOFT TISSUE performed by Radha Randle DPM at [facility] OR    RHINOPLASTY Right     leg      Family History   Problem Relation Age of Onset    Cancer Mother     Diabetes Father     Heart Failure Father     Hypertension Father     Cancer Sister      Social History     Tobacco Use    Smoking status: Current Every Day Smoker     Packs/day: 0.50     Years: 7.00     Pack years: 3.50     Types: Cigarettes    Smokeless tobacco: Never Used   Vaping Use    Vaping Use: Never used   Substance Use Topics    Alcohol use: No    Drug use: No     No Known Allergies  Current Outpatient Medications on File Prior to Encounter   Medication Sig Dispense Refill    cloNIDine (CATAPRES) 0.1 MG tablet TAKE ONE TABLET BY MOUTH THREE TIMES DAILY 270 tablet 3    DULoxetine (CYMBALTA) 60 MG extended release capsule TAKE TWO CAPSULES BY MOUTH EVERY MORNING 180 capsule 1    Lancets (ONETOUCH DELICA PLUS [id number]) MISC USE TO TEST BLOOD SUGAR FOUR TIMES A  each 5    oxyCODONE-acetaminophen (PERCOCET) 7.5-325 MG per tablet Take 1 tablet by mouth every 6 hours as needed for Pain for up to 30 days. Intended supply: 30 days 120 tablet 0    oxyCODONE (OXYCONTIN) 20 MG extended release tablet Take 1 tablet by mouth 2 times daily for 30 days. Intended supply: 30 days 60 tablet 0    gabapentin (NEURONTIN) 800 MG tablet Take 1 tablet by mouth 4 times daily for 30 days. 120 tablet 2    hydrALAZINE (APRESOLINE) 25 MG tablet Take 1 tablet by mouth 3 times daily 90 tablet 3    amLODIPine (NORVASC) 10 MG tablet Take 1 tablet by mouth daily 90 tablet 1    metoprolol tartrate (LOPRESSOR) 25 MG tablet TAKE ONE TABLET BY MOUTH TWO TIMES DAILY 180 tablet 1    chlorthalidone (HYGROTON) 25 MG tablet TAKE 1 TABLET BY MOUTH ONE TIME A DAY 90 tablet 1    blood glucose test strips (ONETOUCH ULTRA) strip 1 each by Other route 3 times daily As needed. 300 each 1    ammonium lactate (LAC-HYDRIN) 12 % lotion Apply topically as needed. 500 g 5    mirtazapine (REMERON) 7.5 MG tablet TAKE ONE TABLET BY MOUTH ONCE NIGHTLY 90 tablet 1    cilostazol (PLETAL) 50 MG tablet TAKE 1 TABLET BY MOUTH 2 TIMES A  tablet 1    Insulin Syringe-Needle U-100 (ULTICARE INSULIN SYRINGE) 31G X 5/16\" 0.3 ML MISC 1 each by Other route 5 times daily 100 each 3    Insulin Pen Needle 29G X 12MM MISC 1 each by Does not apply route daily 100 each 3    insulin lispro (HUMALOG) 100 UNIT/ML injection vial Inject 15 Units into the skin 3 times daily (before meals) 15 vial 3    mineral oil-hydrophilic petrolatum (AQUAPHOR) ointment Apply topically twice daily dispense 14 ounce jar (Patient taking differently: Apply topically once a week ) 396 g 5    Handicap Placard MISC by Does not apply route Patient cannot walk 200 ft without stopping to rest.    Expiration 10/21/2024 1 each 0    lactobacillus (CULTURELLE) CAPS capsule Take 2 capsules by mouth daily 60 capsule 1     No current facility-administered medications on file prior to encounter.        REVIEW OF SYSTEMS See HPI    Objective:    BP (!) 142/74   Pulse 73   Temp 96.8 °F (36 °C) (Tympanic)   Resp 20   Wt 245 lb (111.1 kg)   BMI 31.46 kg/m²   Wt Readings from Last 3 Encounters:   10/27/21 245 lb (111.1 kg)   10/13/21 245 lb (111.1 kg)   08/18/21 245 lb (111.1 kg)     PHYSICAL EXAM  CONSTITUTIONAL:   Awake, alert, cooperative   EYES:  lids and lashes normal   ENT: external ears and nose without lesions   NECK:  supple, symmetrical, trachea midline   SKIN:  Open wound Present    Assessment:     Problem List Items Addressed This Visit     Disruption of closure of muscle or muscle flap, sequela - Primary (Chronic)    Relevant Orders    Initiate Outpatient Wound Care Protocol    Ischemic ulcer of right thigh with fat layer exposed [facility])    Relevant Orders    Initiate Outpatient Wound Care Protocol          Pre Debridement Measurements:  Are located in the [place]  Documentation Flow Sheet  Post Debridement Measurements:  Wound/Ulcer Descriptions are Pre Debridement except measurements:     Wound 06/02/21 Hip Right #2 (Active)   Wound Image   10/27/21 1152   Wound Etiology Surgical 06/02/21 1124   Dressing Status New dressing applied 09/15/21 1231   Wound Cleansed Cleansed with saline 09/15/21 1231   Dressing/Treatment Alginate;Dry dressing 09/15/21 1231   Offloading for Diabetic Foot Ulcers No offloading required 09/15/21 1231   Wound Length (cm) 0 cm 10/27/21 1152   Wound Width (cm) 0 cm 10/27/21 1152   Wound Depth (cm) 0 cm 10/27/21 1152   Wound Surface Area (cm^2) 0 cm^2 10/27/21 1152   Change in Wound Size % (l*w) 100 10/27/21 1152   Wound Volume (cm^3) 0 cm^3 10/27/21 1152   Wound Healing % 100 10/27/21 1152   Post-Procedure Length (cm) 0 cm 10/27/21 1232   Post-Procedure Width (cm) 0 cm 10/27/21 1232   Post-Procedure Depth (cm) 0 cm 10/27/21 1232   Post-Procedure Surface Area (cm^2) 0 cm^2 10/27/21 1232   Post-Procedure Volume (cm^3) 0 cm^3 10/27/21 1232   Distance Tunneling (cm) 4 cm 06/23/21 1153   Tunneling Position ___ O'Clock 12 06/23/21 1153   Undermining Starts ___ O'Clock 12 08/18/21 1229   Undermining Ends___ O'Clock 7 08/18/21 1229   Undermining Maxium Distance (cm)  0.6 @7 08/18/21 1229   Wound Assessment Pink/red 10/13/21 1151   Drainage Amount Moderate 10/13/21 1151   Drainage Description Serosanguinous 10/13/21 1151   Odor None 10/13/21 1151   Nadia-wound Assessment Intact 10/27/21 1152   Number of days: 147           Wound healed    Plan:   Treatment Note please see attached Discharge Instructions    Written patient dismissal instructions given to patient and signed by patient or POA. Discharge Instructions       Discharge Instructions                  Visit Discharge/Physician Orders     Discharge condition: Stable     Assessment of pain at discharge:yes     Anesthetic used: none     Discharge to: Home     Left via:Private automobile     Accompanied by: spouse     ECF/HHA: [facility]-Discontinue-healed     Dressing Orders:RIGHT AKA SITE-May leave open to air. Keep area clean and dry.     Treatment Orders:Eat a diet high in protein and vitamin C. Take a multiple vitamin daily unless contraindicated.      Stop smoking!     [facility] followup visit:none-healed _____________________________  (Please note your next appointment above and if you are unable to keep, kindly give a 24 hour notice.  Thank you.)     Physician signature:__________________________  Nuria Proffer  If you experience any of the following, please call the [address] during business hours:     * Increase in Pain  * Temperature over 101  * Increase in drainage from your wound  * Drainage with a foul odor  * Bleeding  * Increase in swelling  * Need for compression bandage changes due to slippage, breakthrough drainage.     If you need medical attention outside of the business hours of the [facility] [address] [facility] [address] please contact your PCP or go to the nearest emergency room.                                      Electronically signed by Fernandez Portillo MD

## 2021-10-27 NOTE — PLAN OF CARE
Problem: Pain:  Goal: Pain level will decrease  Description: Pain level will decrease  Outcome: Completed  Goal: Control of acute pain  Description: Control of acute pain  Outcome: Completed  Goal: Control of chronic pain  Description: Control of chronic pain  Outcome: Completed     Problem: Wound:  Goal: Will show signs of wound healing; wound closure and no evidence of infection  Description: Will show signs of wound healing; wound closure and no evidence of infection  Outcome: Completed     Problem: Arterial:  Goal: Optimize blood flow for wound healing  Description: Optimize blood flow for wound healing  Outcome: Completed     Problem: Smoking cessation:  Goal: Ability to formulate a plan to maintain a tobacco-free life will be supported  Description: Ability to formulate a plan to maintain a tobacco-free life will be supported  Outcome: Completed     Problem: Weight control:  Goal: Ability to maintain an optimal weight for height and age will be supported  Description: Ability to maintain an optimal weight for height and age will be supported  Outcome: Completed     Problem: Falls - Risk of:  Goal: Will remain free from falls  Description: Will remain free from falls  Outcome: Completed

## 2021-11-05 DIAGNOSIS — G89.4 CHRONIC PAIN SYNDROME: ICD-10-CM

## 2021-11-05 RX ORDER — OXYCODONE HCL 20 MG/1
20 TABLET, FILM COATED, EXTENDED RELEASE ORAL 2 TIMES DAILY
Qty: 60 TABLET | Refills: 0 | Status: SHIPPED
Start: 2021-11-05 | End: 2021-12-03 | Stop reason: SDUPTHER

## 2021-11-10 DIAGNOSIS — T81.49XA POSTOPERATIVE WOUND INFECTION: ICD-10-CM

## 2021-11-10 DIAGNOSIS — G89.4 CHRONIC PAIN SYNDROME: ICD-10-CM

## 2021-11-10 DIAGNOSIS — T87.9 AKA STUMP COMPLICATION (HCC): ICD-10-CM

## 2021-11-11 RX ORDER — OXYCODONE AND ACETAMINOPHEN 7.5; 325 MG/1; MG/1
1 TABLET ORAL EVERY 6 HOURS PRN
Qty: 120 TABLET | Refills: 0 | Status: SHIPPED | OUTPATIENT
Start: 2021-11-11 | End: 2021-12-11

## 2021-12-01 DIAGNOSIS — E11.65 UNCONTROLLED TYPE 2 DIABETES MELLITUS WITH HYPERGLYCEMIA (HCC): ICD-10-CM

## 2021-12-01 RX ORDER — PEN NEEDLE, DIABETIC 29 G X1/2"
NEEDLE, DISPOSABLE MISCELLANEOUS
Qty: 100 EACH | Refills: 5 | Status: SHIPPED
Start: 2021-12-01 | End: 2022-08-18

## 2021-12-03 DIAGNOSIS — G89.4 CHRONIC PAIN SYNDROME: ICD-10-CM

## 2021-12-03 DIAGNOSIS — E11.65 UNCONTROLLED TYPE 2 DIABETES MELLITUS WITH HYPERGLYCEMIA (HCC): ICD-10-CM

## 2021-12-03 DIAGNOSIS — Z89.611 S/P AKA (ABOVE KNEE AMPUTATION), RIGHT (HCC): ICD-10-CM

## 2021-12-03 DIAGNOSIS — Z79.4 CONTROLLED TYPE 2 DIABETES MELLITUS WITH OTHER SPECIFIED COMPLICATION, WITH LONG-TERM CURRENT USE OF INSULIN (HCC): ICD-10-CM

## 2021-12-03 DIAGNOSIS — I73.9 PVD (PERIPHERAL VASCULAR DISEASE) (HCC): ICD-10-CM

## 2021-12-03 DIAGNOSIS — E11.69 CONTROLLED TYPE 2 DIABETES MELLITUS WITH OTHER SPECIFIED COMPLICATION, WITH LONG-TERM CURRENT USE OF INSULIN (HCC): ICD-10-CM

## 2021-12-03 RX ORDER — INSULIN GLARGINE 100 [IU]/ML
INJECTION, SOLUTION SUBCUTANEOUS
Qty: 15 PEN | Refills: 1 | Status: SHIPPED
Start: 2021-12-03 | End: 2022-02-14

## 2021-12-03 RX ORDER — CLONIDINE HYDROCHLORIDE 0.1 MG/1
0.1 TABLET ORAL 3 TIMES DAILY
Qty: 270 TABLET | Refills: 1 | Status: SHIPPED
Start: 2021-12-03 | End: 2022-04-18 | Stop reason: SDUPTHER

## 2021-12-03 RX ORDER — OXYCODONE HCL 20 MG/1
20 TABLET, FILM COATED, EXTENDED RELEASE ORAL 2 TIMES DAILY
Qty: 60 TABLET | Refills: 0 | Status: SHIPPED
Start: 2021-12-03 | End: 2021-12-29 | Stop reason: SDUPTHER

## 2021-12-03 RX ORDER — GABAPENTIN 800 MG/1
800 TABLET ORAL 4 TIMES DAILY
Qty: 360 TABLET | Refills: 1 | Status: SHIPPED
Start: 2021-12-03 | End: 2022-04-18 | Stop reason: SDUPTHER

## 2021-12-09 DIAGNOSIS — Z79.4 CONTROLLED TYPE 2 DIABETES MELLITUS WITH OTHER SPECIFIED COMPLICATION, WITH LONG-TERM CURRENT USE OF INSULIN (HCC): ICD-10-CM

## 2021-12-09 DIAGNOSIS — E11.69 CONTROLLED TYPE 2 DIABETES MELLITUS WITH OTHER SPECIFIED COMPLICATION, WITH LONG-TERM CURRENT USE OF INSULIN (HCC): ICD-10-CM

## 2021-12-09 LAB
BASOPHILS ABSOLUTE: 0.07 E9/L (ref 0–0.2)
BASOPHILS RELATIVE PERCENT: 0.7 % (ref 0–2)
EOSINOPHILS ABSOLUTE: 0.14 E9/L (ref 0.05–0.5)
EOSINOPHILS RELATIVE PERCENT: 1.4 % (ref 0–6)
HBA1C MFR BLD: 8.8 % (ref 4–5.6)
HCT VFR BLD CALC: 41.4 % (ref 37–54)
HEMOGLOBIN: 13.3 G/DL (ref 12.5–16.5)
IMMATURE GRANULOCYTES #: 0.04 E9/L
IMMATURE GRANULOCYTES %: 0.4 % (ref 0–5)
LYMPHOCYTES ABSOLUTE: 2.95 E9/L (ref 1.5–4)
LYMPHOCYTES RELATIVE PERCENT: 28.7 % (ref 20–42)
MCH RBC QN AUTO: 25.7 PG (ref 26–35)
MCHC RBC AUTO-ENTMCNC: 32.1 % (ref 32–34.5)
MCV RBC AUTO: 80.1 FL (ref 80–99.9)
MONOCYTES ABSOLUTE: 0.68 E9/L (ref 0.1–0.95)
MONOCYTES RELATIVE PERCENT: 6.6 % (ref 2–12)
NEUTROPHILS ABSOLUTE: 6.39 E9/L (ref 1.8–7.3)
NEUTROPHILS RELATIVE PERCENT: 62.2 % (ref 43–80)
PDW BLD-RTO: 15.1 FL (ref 11.5–15)
PLATELET # BLD: 201 E9/L (ref 130–450)
PMV BLD AUTO: 12.9 FL (ref 7–12)
RBC # BLD: 5.17 E12/L (ref 3.8–5.8)
SEDIMENTATION RATE, ERYTHROCYTE: 23 MM/HR (ref 0–15)
WBC # BLD: 10.3 E9/L (ref 4.5–11.5)

## 2021-12-10 LAB
ALBUMIN SERPL-MCNC: 3.6 G/DL (ref 3.5–5.2)
ALP BLD-CCNC: 97 U/L (ref 40–129)
ALT SERPL-CCNC: 11 U/L (ref 0–40)
ANION GAP SERPL CALCULATED.3IONS-SCNC: 11 MMOL/L (ref 7–16)
ANTI-NUCLEAR ANTIBODY (ANA): NEGATIVE
AST SERPL-CCNC: 14 U/L (ref 0–39)
BILIRUB SERPL-MCNC: <0.2 MG/DL (ref 0–1.2)
BILIRUBIN DIRECT: <0.2 MG/DL (ref 0–0.3)
BILIRUBIN, INDIRECT: NORMAL MG/DL (ref 0–1)
BUN BLDV-MCNC: 33 MG/DL (ref 6–23)
C-REACTIVE PROTEIN: 1.1 MG/DL (ref 0–0.4)
CALCIUM SERPL-MCNC: 9.5 MG/DL (ref 8.6–10.2)
CHLORIDE BLD-SCNC: 101 MMOL/L (ref 98–107)
CO2: 26 MMOL/L (ref 22–29)
CREAT SERPL-MCNC: 2 MG/DL (ref 0.7–1.2)
GFR AFRICAN AMERICAN: 41
GFR NON-AFRICAN AMERICAN: 41 ML/MIN/1.73
GLUCOSE BLD-MCNC: 195 MG/DL (ref 74–99)
POTASSIUM SERPL-SCNC: 4.3 MMOL/L (ref 3.5–5)
SODIUM BLD-SCNC: 138 MMOL/L (ref 132–146)
TOTAL CK: 370 U/L (ref 20–200)
TOTAL PROTEIN: 7 G/DL (ref 6.4–8.3)

## 2021-12-13 ENCOUNTER — OFFICE VISIT (OUTPATIENT)
Dept: PRIMARY CARE CLINIC | Age: 66
End: 2021-12-13
Payer: COMMERCIAL

## 2021-12-13 VITALS
TEMPERATURE: 97.6 F | OXYGEN SATURATION: 97 % | BODY MASS INDEX: 31.44 KG/M2 | WEIGHT: 245 LBS | DIASTOLIC BLOOD PRESSURE: 62 MMHG | HEART RATE: 74 BPM | SYSTOLIC BLOOD PRESSURE: 112 MMHG | HEIGHT: 74 IN

## 2021-12-13 DIAGNOSIS — G89.4 CHRONIC PAIN SYNDROME: ICD-10-CM

## 2021-12-13 DIAGNOSIS — Z99.89 OSA ON CPAP: Primary | ICD-10-CM

## 2021-12-13 DIAGNOSIS — T87.9 AKA STUMP COMPLICATION (HCC): ICD-10-CM

## 2021-12-13 DIAGNOSIS — G47.33 OSA ON CPAP: Primary | ICD-10-CM

## 2021-12-13 DIAGNOSIS — E11.69 CONTROLLED TYPE 2 DIABETES MELLITUS WITH OTHER SPECIFIED COMPLICATION, WITH LONG-TERM CURRENT USE OF INSULIN (HCC): ICD-10-CM

## 2021-12-13 DIAGNOSIS — I73.9 PVD (PERIPHERAL VASCULAR DISEASE) (HCC): ICD-10-CM

## 2021-12-13 DIAGNOSIS — Z79.4 CONTROLLED TYPE 2 DIABETES MELLITUS WITH OTHER SPECIFIED COMPLICATION, WITH LONG-TERM CURRENT USE OF INSULIN (HCC): ICD-10-CM

## 2021-12-13 PROCEDURE — G0008 ADMIN INFLUENZA VIRUS VAC: HCPCS | Performed by: FAMILY MEDICINE

## 2021-12-13 PROCEDURE — 90674 CCIIV4 VAC NO PRSV 0.5 ML IM: CPT | Performed by: FAMILY MEDICINE

## 2021-12-13 PROCEDURE — 4004F PT TOBACCO SCREEN RCVD TLK: CPT | Performed by: FAMILY MEDICINE

## 2021-12-13 PROCEDURE — G8482 FLU IMMUNIZE ORDER/ADMIN: HCPCS | Performed by: FAMILY MEDICINE

## 2021-12-13 PROCEDURE — G8417 CALC BMI ABV UP PARAM F/U: HCPCS | Performed by: FAMILY MEDICINE

## 2021-12-13 PROCEDURE — 3017F COLORECTAL CA SCREEN DOC REV: CPT | Performed by: FAMILY MEDICINE

## 2021-12-13 PROCEDURE — 3052F HG A1C>EQUAL 8.0%<EQUAL 9.0%: CPT | Performed by: FAMILY MEDICINE

## 2021-12-13 PROCEDURE — 99213 OFFICE O/P EST LOW 20 MIN: CPT | Performed by: FAMILY MEDICINE

## 2021-12-13 PROCEDURE — G8427 DOCREV CUR MEDS BY ELIG CLIN: HCPCS | Performed by: FAMILY MEDICINE

## 2021-12-13 PROCEDURE — 2022F DILAT RTA XM EVC RTNOPTHY: CPT | Performed by: FAMILY MEDICINE

## 2021-12-13 RX ORDER — OXYCODONE AND ACETAMINOPHEN 7.5; 325 MG/1; MG/1
1 TABLET ORAL EVERY 4 HOURS PRN
COMMUNITY
End: 2021-12-13 | Stop reason: SDUPTHER

## 2021-12-13 RX ORDER — OXYCODONE AND ACETAMINOPHEN 7.5; 325 MG/1; MG/1
1 TABLET ORAL EVERY 4 HOURS PRN
Qty: 120 TABLET | Refills: 0 | Status: SHIPPED
Start: 2021-12-13 | End: 2022-01-10 | Stop reason: SDUPTHER

## 2021-12-13 RX ORDER — NALOXONE HYDROCHLORIDE 4 MG/.1ML
1 SPRAY NASAL PRN
Qty: 1 EACH | Refills: 5 | Status: SHIPPED | OUTPATIENT
Start: 2021-12-13

## 2021-12-13 ASSESSMENT — ENCOUNTER SYMPTOMS
DIARRHEA: 0
BLOOD IN STOOL: 0
APNEA: 1
SHORTNESS OF BREATH: 0
PHOTOPHOBIA: 0
BACK PAIN: 0
CONSTIPATION: 1

## 2021-12-13 NOTE — PROGRESS NOTES
Britney Francis (:  1955) is a 61 y.o. male,Established patient, here for evaluation of the following chief complaint(s):  3 Month Follow-Up         ASSESSMENT/PLAN:  1. RAGHU on CPAP  -     DME Order for CPAP as OP  2. Chronic pain syndrome  -     oxyCODONE-acetaminophen (PERCOCET) 7.5-325 MG per tablet; Take 1 tablet by mouth every 4 hours as needed for Pain for up to 30 days. , Disp-120 tablet, R-0Normal  3. Controlled type 2 diabetes mellitus with other specified complication, with long-term current use of insulin (HCC)  -     oxyCODONE-acetaminophen (PERCOCET) 7.5-325 MG per tablet; Take 1 tablet by mouth every 4 hours as needed for Pain for up to 30 days. , Disp-120 tablet, R-0Normal  4. AKA stump complication (HCC)  -     oxyCODONE-acetaminophen (PERCOCET) 7.5-325 MG per tablet; Take 1 tablet by mouth every 4 hours as needed for Pain for up to 30 days. , Disp-120 tablet, R-0Normal  5. PVD (peripheral vascular disease) ([facility]  -     oxyCODONE-acetaminophen (PERCOCET) 7.5-325 MG per tablet; Take 1 tablet by mouth every 4 hours as needed for Pain for up to 30 days. , Disp-120 tablet, R-0Normal  This time we will continue with current medication regimen. See him back in 3 months. No follow-ups on file. Subjective   SUBJECTIVE/OBJECTIVE:  HPI  Patient presents today for follow-up on chronic issues and for medication adjustment. Patient continues to follow with wound care due to continued right AKA stump infection. Patient recently had his PICC line removed and is currently on long-term suppressive therapy. He was told that he will most likely need to see plastic surgery and have another surgery in the near future. Patient refused at this time and does not wish to proceed with further surgery at this time. Patient states he is having significant exacerbations of his chronic pain. Has been taking all medications as prescribed without any side effect or adverse reaction.     Update 12/13/2021  Patient is here to follow-up on chronic issues and for medication refills. Patient states he is doing much better since last visit. His right AKA wound is actually healing. He is being set up for prosthetic fitting and physical therapy in the near future. Patient states he is feeling better overall since last visit. No current concerns or complaints. Review of Systems   Constitutional: Positive for fatigue. HENT: Negative for hearing loss and nosebleeds. Eyes: Negative for photophobia. Respiratory: Positive for apnea. Negative for shortness of breath. Cardiovascular: Negative for palpitations and leg swelling. Gastrointestinal: Positive for constipation. Negative for blood in stool and diarrhea. Endocrine: Negative for polydipsia. Genitourinary: Negative for dysuria, frequency, hematuria and urgency. Musculoskeletal: Positive for arthralgias, gait problem, joint swelling and myalgias. Negative for back pain. Right Sided above-the-knee amputation revised multiple times until it is  just distal to the hip joint   Skin: Negative. Neurological: Positive for numbness. Negative for dizziness and tremors. Hematological: Does not bruise/bleed easily. Psychiatric/Behavioral: Positive for decreased concentration, dysphoric mood and sleep disturbance. Negative for hallucinations, self-injury and suicidal ideas. The patient is nervous/anxious. All other systems reviewed and are negative. Current Outpatient Medications:     oxyCODONE-acetaminophen (PERCOCET) 7.5-325 MG per tablet, Take 1 tablet by mouth every 4 hours as needed for Pain for up to 30 days. , Disp: 120 tablet, Rfl: 0    naloxone 4 MG/0.1ML LIQD nasal spray, 1 spray by Nasal route as needed for Opioid Reversal, Disp: 1 each, Rfl: 5    oxyCODONE (OXYCONTIN) 20 MG extended release tablet, Take 1 tablet by mouth 2 times daily for 30 days.  Intended supply: 30 days, Disp: 60 tablet, Rfl: 0   (Patient taking differently: Apply topically once a week ), Disp: 396 g, Rfl: 5    Handicap Placard MISC, by Does not apply route Patient cannot walk 200 ft without stopping to rest.   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)    Coagulopathy (HCC)    Moderate protein-calorie malnutrition ([address]    PVD (peripheral vascular disease) ([address]    Leukocytosis    Stage 3 chronic kidney disease ([address]    Tobacco dependence    HLD (hyperlipidemia)    Osteomyelitis of right lower limb (HCC)    S/P AKA (above knee amputation), right (HCC)    History of vascular surgery    Occlusion of common femoral artery (HCC)    Critical lower limb ischemia ([address]    Vascular occlusion    Wound infection    Postoperative wound infection    Ischemic ulcer of right thigh with fat layer exposed ([address]    Ischemic ulcer of right thigh with necrosis of muscle ([address]    Above knee amputation of right lower extremity ([address]    Postoperative pain    Disruption of closure of muscle or muscle flap, sequela    Chronic pain syndrome    AKA stump complication (HCC)    Elevated sedimentation rate    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)    Major depressive disorder, single episode, unspecified    Muscle weakness (generalized)    Obstructive sleep apnea (adult) (pediatric)     Past Medical History:   Diagnosis Date    Acute kidney injury ([address] 5/18/2020    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 ([address] 5/22/2018    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 ([address] 5/21/2018    Cellulitis, scrotum 3/20/2020    Critical lower limb ischemia ([address] 3/20/2020    Diabetes mellitus ([facility]     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 2 diabetes mellitus, with fat layer exposed ([facility] 5/22/2018    Disruption of closure of muscle or muscle flap, sequela 8/26/2020    DVT, lower extremity ([facility]     right leg     Encounter for dental examination and cleaning with abnormal findings 4/2/2018    Gas gangrene of thigh ([facility] 3/20/2020    History of DVT (deep vein thrombosis) 7/31/2018    Hyperglycemia due to type 2 diabetes mellitus ([address] 3/20/2020    Hyperlipidemia     Hypertension     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[facility]     Osteomyelitis ([facility] 10/24/2018    PVD (peripheral vascular disease) ([facility]      Past Surgical History:   Procedure Laterality Date    AMPUTATION ABOVE KNEE Right 4/28/2020    DEBRIDEMENT OF AMPUTATION RIGHT ABOVE KNEE performed by Camille Olmedo MD at [address] [place] [address] Right 4/30/2020    DEBRIDEMENT OF AMPUTATION RIGHT ABOVE KNEE,  POSS. ABOVE KNEE REVISION, POSS. CLOSURE, POSS.WOUND VAC -- BEN Gottlieb / Jairo Demarco TO LOOK IN performed by Camille Olmedo MD at [place] [address] Right 3/20/2020    RIGHT LOWER EXTREMITY THROMBECTOMY, POSSIBLE ANGIOGRAM, POSSIBLE INTERVENTION, POSSIBLE BYPASS. performed by Camille Olmedo MD at [place] [address] Right 4/17/2020    RIGHT LEG DEBRIDEMENT INCISION AND DRAINAGE performed by Aric Connelly MD at [place] [address] Right 4/20/2020    RIGHT THIGH WOUND DRESSING CHANGE; DEBRIDEMENT, removal of muscle performed by Aric Connelly MD at [place] [address] Right 4/21/2020    RIGHT THIGH WOUND DRESSING CHANGE POSSIBLE  DEBRIDEMENT - NEEDS BEN Gottlieb / JANETTE TO SEE performed by Elisabeth Magana MD at [facility] Right 4/23/2020    RIGHT THIGH WOUND DRESSING CHANGE and DEBRIDEMENT performed by Camille Olmedo MD at [place] [address] Right 10/15/2020 DEBRIDEMENT RIGHT ABOVE KNEE AMPUTATION POSS. REVISION POSS. WOUND VAC performed by Hugo Fine MD at [address] Right 12/17/2020    DEBRIDEMENT  RIGHT ABOVE KNEE AMPUTATION WITH POSS.  ADVANCED SKIN THERAPY performed by Hugo Fine MD at [address] Right 5/14/2021    DEBRIDEMENT RIGHT ABOVE KNEE AMPUTATION performed by Anthony Galvan MD at [address] Right 5/18/2021    DEBRIDEMENT ABOVE THE KNEE AMPUTATION , WITH WOUND VAC APPLICATION performed by Hugo Fine MD at [facility] Right 11/1/2018    AMPUTATION ABOVE KNEE RIGHT LEG performed by Hugo Fine MD at [facility] Right 5/24/2018    RIGHT FOOT INCISION AND DRAINAGE WITH PARTIAL BONE RESECTION performed by Barbi Orosco DPM at [address] [place] [address] OFFICE/OUTPT VISIT,PROCEDURE ONLY Right 8/3/2018    INCISION AND DRAINAGE MULTIPLE AREAS RIGHT FOOT WITH DEBRIDEMENT SOFT TISSUE performed by Barbi Orosco DPM at [facility] Right     leg      Social History     Socioeconomic History    Marital status: Single     Spouse name: Not on file    Number of children: Not on file    Years of education: Not on file    Highest education level: Not on file   Occupational History    Not on file   Tobacco Use    Smoking status: Current Every Day Smoker     Packs/day: 0.50     Years: 7.00     Pack years: 3.50     Types: Cigarettes    Smokeless tobacco: Never Used   Vaping Use    Vaping Use: Never used   Substance and Sexual Activity    Alcohol use: No    Drug use: No    Sexual activity: Not on file   Other Topics Concern    Not on file   Social History Narrative    Not on file     Social Determinants of Health     Financial Resource Strain:     Difficulty of Paying Living Expenses: Not on file   Food Insecurity:     Worried About Running Out of Food in the Last Year: Not on file    [facility] [address] in the Last Year: Not on file   Transportation Needs:     Lack of Transportation (Medical): Not on file    Lack of Transportation (Non-Medical): Not on file   Physical Activity:     Days of Exercise per Week: Not on file    Minutes of Exercise per Session: Not on file   Stress:     Feeling of Stress : Not on file   Social Connections:     Frequency of Communication with Friends and Family: Not on file    Frequency of Social Gatherings with Friends and Family: Not on file    Attends Hoah[employer] Services: Not on file    Active Member of [address] [place] [address] [employer] or Organizations: Not on file    Attends Club or Organization Meetings: Not on file    Marital Status: Not on file   Intimate Partner Violence:     Fear of Current or Ex-Partner: Not on file    Emotionally Abused: Not on file    Physically Abused: Not on file    Sexually Abused: Not on file   Housing Stability:     Unable to Pay for Housing in the Last Year: Not on file    Number of [place] in the Last Year: Not on file    Unstable Housing in the Last Year: Not on file     Family History   Problem Relation Age of Onset    Cancer Mother     Diabetes Father     Heart Failure Father     Hypertension Father     Cancer Sister       Health Maintenance Due   Topic Date Due    Colon cancer screen colonoscopy  Never done     There are no preventive care reminders to display for this patient. There are no preventive care reminders to display for this patient.    Health Maintenance Due   Topic    Shingles Vaccine (1 of 2)      Health Maintenance   Topic Date Due    HIV screen  Never done    Colon cancer screen colonoscopy  Never done    Shingles Vaccine (1 of 2) Never done    COVID-19 Vaccine (3 - Booster for Pfizer series) 09/17/2021    Diabetic foot exam  12/21/2021 (Originally 10/24/2019)    DTaP/Tdap/Td vaccine (1 - Tdap) 12/21/2021 (Originally 12/13/1976)    Diabetic microalbuminuria test  08/12/2022    Lipid screen  08/12/2022   [employer] Visit (AWV)  09/10/2022    A1C test (Diabetic or Prediabetic)  12/09/2022    Potassium monitoring  12/09/2022    Creatinine monitoring  12/09/2022    Diabetic retinal exam  03/30/2023    Pneumococcal 0-64 years Vaccine (2 of 2 - PPSV23) 01/01/2025    Flu vaccine  Completed    Hepatitis C screen  Completed    Hepatitis A vaccine  Aged Out    Hib vaccine  Aged Out    Meningococcal (ACWY) vaccine  Aged Out      There are no preventive care reminders to display for this patient. There are no preventive care reminders to display for this patient. /62   Pulse 74   Temp 97.6 °F (36.4 °C)   Ht 6' 2\" (1.88 m)   Wt 245 lb (111.1 kg)   SpO2 97%   BMI 31.46 kg/m²       Objective   Physical Exam  Vitals reviewed. Constitutional:       Appearance: He is obese. HENT:      Head: Normocephalic and atraumatic. Mouth/Throat:      Dentition: Abnormal dentition (edentulous upper). Eyes:      General: No scleral icterus. Conjunctiva/sclera: Conjunctivae normal.      Pupils: Pupils are equal, round, and reactive to light. Neck:      Thyroid: No thyromegaly. Cardiovascular:      Rate and Rhythm: Normal rate and regular rhythm. Heart sounds: Normal heart sounds. No murmur heard. Pulmonary:      Effort: Pulmonary effort is normal.      Breath sounds: Normal breath sounds. No rales. Abdominal:      General: Bowel sounds are decreased. There is no distension. Palpations: Abdomen is soft. Tenderness: There is no abdominal tenderness. Musculoskeletal:         General: Normal range of motion. Cervical back: Neck supple. Legs:    Lymphadenopathy:      Cervical: No cervical adenopathy. Skin:     General: Skin is warm and dry. Findings: No erythema or rash. Neurological:      Mental Status: He is alert and oriented to person, place, and time. Cranial Nerves: No cranial nerve deficit.    Psychiatric:         Judgment: Judgment normal. An electronic signature was used to authenticate this note.     --Victor Manuel Kline, DO

## 2021-12-29 DIAGNOSIS — G89.4 CHRONIC PAIN SYNDROME: ICD-10-CM

## 2021-12-29 RX ORDER — OXYCODONE HCL 20 MG/1
20 TABLET, FILM COATED, EXTENDED RELEASE ORAL 2 TIMES DAILY
Qty: 60 TABLET | Refills: 0 | Status: SHIPPED
Start: 2021-12-29 | End: 2022-02-01 | Stop reason: SDUPTHER

## 2022-01-10 DIAGNOSIS — E11.69 CONTROLLED TYPE 2 DIABETES MELLITUS WITH OTHER SPECIFIED COMPLICATION, WITH LONG-TERM CURRENT USE OF INSULIN (HCC): ICD-10-CM

## 2022-01-10 DIAGNOSIS — Z79.4 CONTROLLED TYPE 2 DIABETES MELLITUS WITH OTHER SPECIFIED COMPLICATION, WITH LONG-TERM CURRENT USE OF INSULIN (HCC): ICD-10-CM

## 2022-01-10 DIAGNOSIS — T87.9 AKA STUMP COMPLICATION (HCC): ICD-10-CM

## 2022-01-10 DIAGNOSIS — G89.4 CHRONIC PAIN SYNDROME: ICD-10-CM

## 2022-01-10 DIAGNOSIS — I73.9 PVD (PERIPHERAL VASCULAR DISEASE) (HCC): ICD-10-CM

## 2022-01-10 RX ORDER — OXYCODONE AND ACETAMINOPHEN 7.5; 325 MG/1; MG/1
1 TABLET ORAL EVERY 4 HOURS PRN
Qty: 120 TABLET | Refills: 0 | Status: SHIPPED
Start: 2022-01-10 | End: 2022-01-31 | Stop reason: SDUPTHER

## 2022-01-16 DIAGNOSIS — I10 HYPERTENSION, UNSPECIFIED TYPE: ICD-10-CM

## 2022-01-18 RX ORDER — HYDRALAZINE HYDROCHLORIDE 25 MG/1
TABLET, FILM COATED ORAL
Qty: 90 TABLET | Refills: 3 | Status: SHIPPED
Start: 2022-01-18 | End: 2022-04-18 | Stop reason: SDUPTHER

## 2022-01-31 DIAGNOSIS — T87.9 AKA STUMP COMPLICATION (HCC): ICD-10-CM

## 2022-01-31 DIAGNOSIS — E11.69 CONTROLLED TYPE 2 DIABETES MELLITUS WITH OTHER SPECIFIED COMPLICATION, WITH LONG-TERM CURRENT USE OF INSULIN (HCC): ICD-10-CM

## 2022-01-31 DIAGNOSIS — I73.9 PVD (PERIPHERAL VASCULAR DISEASE) (HCC): ICD-10-CM

## 2022-01-31 DIAGNOSIS — Z79.4 CONTROLLED TYPE 2 DIABETES MELLITUS WITH OTHER SPECIFIED COMPLICATION, WITH LONG-TERM CURRENT USE OF INSULIN (HCC): ICD-10-CM

## 2022-01-31 DIAGNOSIS — G89.4 CHRONIC PAIN SYNDROME: ICD-10-CM

## 2022-01-31 RX ORDER — OXYCODONE AND ACETAMINOPHEN 7.5; 325 MG/1; MG/1
1 TABLET ORAL EVERY 4 HOURS PRN
Qty: 120 TABLET | Refills: 0 | Status: SHIPPED
Start: 2022-01-31 | End: 2022-03-11 | Stop reason: SDUPTHER

## 2022-02-01 DIAGNOSIS — G89.4 CHRONIC PAIN SYNDROME: ICD-10-CM

## 2022-02-01 RX ORDER — OXYCODONE HCL 20 MG/1
20 TABLET, FILM COATED, EXTENDED RELEASE ORAL 2 TIMES DAILY
Qty: 60 TABLET | Refills: 0 | Status: SHIPPED
Start: 2022-02-01 | End: 2022-02-28 | Stop reason: SDUPTHER

## 2022-02-14 DIAGNOSIS — E11.65 UNCONTROLLED TYPE 2 DIABETES MELLITUS WITH HYPERGLYCEMIA (HCC): ICD-10-CM

## 2022-02-14 RX ORDER — INSULIN GLARGINE 100 [IU]/ML
INJECTION, SOLUTION SUBCUTANEOUS
Qty: 15 ML | Refills: 11 | Status: SHIPPED
Start: 2022-02-14 | End: 2022-04-18 | Stop reason: SDUPTHER

## 2022-02-28 DIAGNOSIS — G89.4 CHRONIC PAIN SYNDROME: ICD-10-CM

## 2022-02-28 DIAGNOSIS — I10 HYPERTENSION, UNSPECIFIED TYPE: ICD-10-CM

## 2022-02-28 RX ORDER — OXYCODONE HCL 20 MG/1
20 TABLET, FILM COATED, EXTENDED RELEASE ORAL 2 TIMES DAILY
Qty: 60 TABLET | Refills: 0 | Status: SHIPPED
Start: 2022-02-28 | End: 2022-04-01 | Stop reason: SDUPTHER

## 2022-02-28 RX ORDER — CHLORTHALIDONE 25 MG/1
TABLET ORAL
Qty: 90 TABLET | Refills: 1 | Status: SHIPPED
Start: 2022-02-28 | End: 2022-04-18 | Stop reason: SDUPTHER

## 2022-02-28 RX ORDER — AMLODIPINE BESYLATE 10 MG/1
10 TABLET ORAL DAILY
Qty: 90 TABLET | Refills: 1 | Status: SHIPPED
Start: 2022-02-28 | End: 2022-04-18 | Stop reason: SDUPTHER

## 2022-02-28 RX ORDER — DULOXETIN HYDROCHLORIDE 60 MG/1
CAPSULE, DELAYED RELEASE ORAL
Qty: 180 CAPSULE | Refills: 1 | Status: SHIPPED
Start: 2022-02-28 | End: 2022-04-18 | Stop reason: SDUPTHER

## 2022-02-28 RX ORDER — MIRTAZAPINE 7.5 MG/1
TABLET, FILM COATED ORAL
Qty: 90 TABLET | Refills: 1 | Status: SHIPPED
Start: 2022-02-28 | End: 2022-04-18 | Stop reason: SDUPTHER

## 2022-03-11 DIAGNOSIS — Z79.4 CONTROLLED TYPE 2 DIABETES MELLITUS WITH OTHER SPECIFIED COMPLICATION, WITH LONG-TERM CURRENT USE OF INSULIN (HCC): ICD-10-CM

## 2022-03-11 DIAGNOSIS — E11.69 CONTROLLED TYPE 2 DIABETES MELLITUS WITH OTHER SPECIFIED COMPLICATION, WITH LONG-TERM CURRENT USE OF INSULIN (HCC): ICD-10-CM

## 2022-03-11 DIAGNOSIS — T87.9 AKA STUMP COMPLICATION (HCC): ICD-10-CM

## 2022-03-11 DIAGNOSIS — I73.9 PVD (PERIPHERAL VASCULAR DISEASE) (HCC): ICD-10-CM

## 2022-03-11 DIAGNOSIS — G89.4 CHRONIC PAIN SYNDROME: ICD-10-CM

## 2022-03-11 RX ORDER — OXYCODONE AND ACETAMINOPHEN 7.5; 325 MG/1; MG/1
1 TABLET ORAL EVERY 4 HOURS PRN
Qty: 120 TABLET | Refills: 0 | Status: SHIPPED
Start: 2022-03-11 | End: 2022-04-11 | Stop reason: SDUPTHER

## 2022-03-15 RX ORDER — BLOOD SUGAR DIAGNOSTIC
STRIP MISCELLANEOUS
Qty: 300 STRIP | Refills: 5 | Status: SHIPPED | OUTPATIENT
Start: 2022-03-15

## 2022-04-01 DIAGNOSIS — G89.4 CHRONIC PAIN SYNDROME: ICD-10-CM

## 2022-04-01 RX ORDER — OXYCODONE HCL 20 MG/1
20 TABLET, FILM COATED, EXTENDED RELEASE ORAL 2 TIMES DAILY
Qty: 60 TABLET | Refills: 0 | Status: SHIPPED
Start: 2022-04-01 | End: 2022-05-02 | Stop reason: SDUPTHER

## 2022-04-11 DIAGNOSIS — E11.69 CONTROLLED TYPE 2 DIABETES MELLITUS WITH OTHER SPECIFIED COMPLICATION, WITH LONG-TERM CURRENT USE OF INSULIN (HCC): ICD-10-CM

## 2022-04-11 DIAGNOSIS — G89.4 CHRONIC PAIN SYNDROME: ICD-10-CM

## 2022-04-11 DIAGNOSIS — T87.9 AKA STUMP COMPLICATION (HCC): ICD-10-CM

## 2022-04-11 DIAGNOSIS — I73.9 PVD (PERIPHERAL VASCULAR DISEASE) (HCC): ICD-10-CM

## 2022-04-11 DIAGNOSIS — Z79.4 CONTROLLED TYPE 2 DIABETES MELLITUS WITH OTHER SPECIFIED COMPLICATION, WITH LONG-TERM CURRENT USE OF INSULIN (HCC): ICD-10-CM

## 2022-04-11 RX ORDER — OXYCODONE AND ACETAMINOPHEN 7.5; 325 MG/1; MG/1
1 TABLET ORAL EVERY 4 HOURS PRN
Qty: 120 TABLET | Refills: 0 | Status: SHIPPED
Start: 2022-04-11 | End: 2022-05-10 | Stop reason: SDUPTHER

## 2022-04-18 ENCOUNTER — OFFICE VISIT (OUTPATIENT)
Dept: PRIMARY CARE CLINIC | Age: 67
End: 2022-04-18
Payer: COMMERCIAL

## 2022-04-18 VITALS
OXYGEN SATURATION: 99 % | TEMPERATURE: 97.4 F | DIASTOLIC BLOOD PRESSURE: 80 MMHG | HEART RATE: 76 BPM | BODY MASS INDEX: 31.46 KG/M2 | HEIGHT: 74 IN | SYSTOLIC BLOOD PRESSURE: 136 MMHG

## 2022-04-18 DIAGNOSIS — E11.69 CONTROLLED TYPE 2 DIABETES MELLITUS WITH OTHER SPECIFIED COMPLICATION, WITH LONG-TERM CURRENT USE OF INSULIN (HCC): ICD-10-CM

## 2022-04-18 DIAGNOSIS — E11.65 UNCONTROLLED TYPE 2 DIABETES MELLITUS WITH HYPERGLYCEMIA (HCC): ICD-10-CM

## 2022-04-18 DIAGNOSIS — R06.9 UNSPECIFIED ABNORMALITIES OF BREATHING: ICD-10-CM

## 2022-04-18 DIAGNOSIS — I73.9 PVD (PERIPHERAL VASCULAR DISEASE) (HCC): ICD-10-CM

## 2022-04-18 DIAGNOSIS — Z12.5 ENCOUNTER FOR SCREENING FOR MALIGNANT NEOPLASM OF PROSTATE: ICD-10-CM

## 2022-04-18 DIAGNOSIS — I10 HYPERTENSION, UNSPECIFIED TYPE: ICD-10-CM

## 2022-04-18 DIAGNOSIS — Z12.12 SCREENING FOR MALIGNANT NEOPLASM OF THE RECTUM: Primary | ICD-10-CM

## 2022-04-18 DIAGNOSIS — Z79.4 CONTROLLED TYPE 2 DIABETES MELLITUS WITH OTHER SPECIFIED COMPLICATION, WITH LONG-TERM CURRENT USE OF INSULIN (HCC): ICD-10-CM

## 2022-04-18 DIAGNOSIS — Z89.611 S/P AKA (ABOVE KNEE AMPUTATION), RIGHT (HCC): ICD-10-CM

## 2022-04-18 PROCEDURE — G8427 DOCREV CUR MEDS BY ELIG CLIN: HCPCS | Performed by: FAMILY MEDICINE

## 2022-04-18 PROCEDURE — 2022F DILAT RTA XM EVC RTNOPTHY: CPT | Performed by: FAMILY MEDICINE

## 2022-04-18 PROCEDURE — 3046F HEMOGLOBIN A1C LEVEL >9.0%: CPT | Performed by: FAMILY MEDICINE

## 2022-04-18 PROCEDURE — 4004F PT TOBACCO SCREEN RCVD TLK: CPT | Performed by: FAMILY MEDICINE

## 2022-04-18 PROCEDURE — G8417 CALC BMI ABV UP PARAM F/U: HCPCS | Performed by: FAMILY MEDICINE

## 2022-04-18 PROCEDURE — 99213 OFFICE O/P EST LOW 20 MIN: CPT | Performed by: FAMILY MEDICINE

## 2022-04-18 PROCEDURE — 3017F COLORECTAL CA SCREEN DOC REV: CPT | Performed by: FAMILY MEDICINE

## 2022-04-18 RX ORDER — CLONIDINE HYDROCHLORIDE 0.1 MG/1
0.1 TABLET ORAL 3 TIMES DAILY
Qty: 270 TABLET | Refills: 5 | Status: SHIPPED | OUTPATIENT
Start: 2022-04-18

## 2022-04-18 RX ORDER — AMMONIUM LACTATE 12 G/100G
LOTION TOPICAL
Qty: 500 G | Refills: 5 | Status: SHIPPED
Start: 2022-04-18 | End: 2022-08-18 | Stop reason: SDUPTHER

## 2022-04-18 RX ORDER — CHLORTHALIDONE 25 MG/1
TABLET ORAL
Qty: 90 TABLET | Refills: 1 | Status: SHIPPED | OUTPATIENT
Start: 2022-04-18

## 2022-04-18 RX ORDER — DULOXETIN HYDROCHLORIDE 60 MG/1
CAPSULE, DELAYED RELEASE ORAL
Qty: 180 CAPSULE | Refills: 1 | Status: SHIPPED | OUTPATIENT
Start: 2022-04-18

## 2022-04-18 RX ORDER — MIRTAZAPINE 7.5 MG/1
TABLET, FILM COATED ORAL
Qty: 90 TABLET | Refills: 5 | Status: SHIPPED
Start: 2022-04-18 | End: 2022-08-18 | Stop reason: SDUPTHER

## 2022-04-18 RX ORDER — CILOSTAZOL 50 MG/1
TABLET ORAL
Qty: 180 TABLET | Refills: 3 | Status: SHIPPED | OUTPATIENT
Start: 2022-04-18

## 2022-04-18 RX ORDER — GABAPENTIN 800 MG/1
800 TABLET ORAL 4 TIMES DAILY
Qty: 360 TABLET | Refills: 1 | Status: SHIPPED | OUTPATIENT
Start: 2022-04-18 | End: 2022-10-15

## 2022-04-18 RX ORDER — INSULIN GLARGINE 100 [IU]/ML
INJECTION, SOLUTION SUBCUTANEOUS
Qty: 15 ML | Refills: 11 | Status: SHIPPED | OUTPATIENT
Start: 2022-04-18

## 2022-04-18 RX ORDER — HYDRALAZINE HYDROCHLORIDE 25 MG/1
TABLET, FILM COATED ORAL
Qty: 90 TABLET | Refills: 5 | Status: SHIPPED | OUTPATIENT
Start: 2022-04-18

## 2022-04-18 RX ORDER — AMLODIPINE BESYLATE 10 MG/1
10 TABLET ORAL DAILY
Qty: 90 TABLET | Refills: 1 | Status: SHIPPED | OUTPATIENT
Start: 2022-04-18

## 2022-04-18 ASSESSMENT — ENCOUNTER SYMPTOMS
BLOOD IN STOOL: 0
DIARRHEA: 0
CONSTIPATION: 1
PHOTOPHOBIA: 0
SHORTNESS OF BREATH: 0
APNEA: 1
BACK PAIN: 0

## 2022-04-18 NOTE — PROGRESS NOTES
Ross Larry (:  1955) is a 61 y.o. male,Established patient, here for evaluation of the following chief complaint(s):  3 Month Follow-Up and Hip Pain         ASSESSMENT/PLAN:  1. Screening for malignant neoplasm of the rectum  -     Fecal DNA Colorectal cancer screening (Cologuard)  2. Hypertension, unspecified type  -     hydrALAZINE (APRESOLINE) 25 MG tablet; take 1 tablet by mouth three times a day, Disp-90 tablet, R-5Normal  -     chlorthalidone (HYGROTON) 25 MG tablet; TAKE 1 TABLET BY MOUTH ONE TIME A DAY, Disp-90 tablet, R-1Normal  -     CBC with Auto Differential; Future  -     Brain Natriuretic Peptide; Future  -     Lipase; Future  -     T4, Free; Future  -     Uric Acid; Future  -     PSA Screening; Future  -     HIV Screen; Future  -     TSH; Future  -     Hepatic Function Panel; Future  -     Basic Metabolic Panel; Future  -     Lipid Panel; Future  -     Microalbumin / Creatinine Urine Ratio; Future  -     Hemoglobin A1C; Future  3. PVD (peripheral vascular disease) (HCC)  -     cilostazol (PLETAL) 50 MG tablet; TAKE 1 TABLET BY MOUTH 2 TIMES A DAY, Disp-180 tablet, R-3Normal  -     gabapentin (NEURONTIN) 800 MG tablet; Take 1 tablet by mouth 4 times daily for 180 days. , Disp-360 tablet, R-1Normal  -     CBC with Auto Differential; Future  -     Brain Natriuretic Peptide; Future  -     Lipase; Future  -     T4, Free; Future  -     Uric Acid; Future  -     PSA Screening; Future  -     HIV Screen; Future  -     TSH; Future  -     Hepatic Function Panel; Future  -     Basic Metabolic Panel; Future  -     Lipid Panel; Future  -     Microalbumin / Creatinine Urine Ratio; Future  -     Hemoglobin A1C; Future  4. Controlled type 2 diabetes mellitus with other specified complication, with long-term current use of insulin (HCC)  -     gabapentin (NEURONTIN) 800 MG tablet; Take 1 tablet by mouth 4 times daily for 180 days. , Disp-360 tablet, R-1Normal  5.  S/P AKA (above knee amputation), right ([facility]  -     gabapentin (NEURONTIN) 800 MG tablet; Take 1 tablet by mouth 4 times daily for 180 days. , Disp-360 tablet, R-1Normal  -     CBC with Auto Differential; Future  -     Brain Natriuretic Peptide; Future  -     Lipase; Future  -     T4, Free; Future  -     Uric Acid; Future  -     PSA Screening; Future  -     HIV Screen; Future  -     TSH; Future  -     Hepatic Function Panel; Future  -     Basic Metabolic Panel; Future  -     Lipid Panel; Future  -     Microalbumin / Creatinine Urine Ratio; Future  -     Hemoglobin A1C; Future  6. Uncontrolled type 2 diabetes mellitus with hyperglycemia (HCC)  -     insulin glargine (LANTUS SOLOSTAR) 100 UNIT/ML injection pen; inject 10 unit in IN THE MORNING and 30 unit every evening, Disp-15 mL, R-11Normal  -     CBC with Auto Differential; Future  -     Brain Natriuretic Peptide; Future  -     Lipase; Future  -     T4, Free; Future  -     Uric Acid; Future  -     PSA Screening; Future  -     HIV Screen; Future  -     TSH; Future  -     Hepatic Function Panel; Future  -     Basic Metabolic Panel; Future  -     Lipid Panel; Future  -     Microalbumin / Creatinine Urine Ratio; Future  -     Hemoglobin A1C; Future  7. Unspecified abnormalities of breathing   -     Brain Natriuretic Peptide; Future  -     PSA Screening; Future  -     HIV Screen; Future  8. Encounter for screening for malignant neoplasm of prostate   -     PSA Screening; Future  At this time medications were refilled. Baseline labs will be ordered prior to next visit. He will continue to follow with orthotics for fitting of his right lower extremity prosthetic. No other issues or concerns at this time. Return in about 4 months (around 8/18/2022). Subjective   SUBJECTIVE/OBJECTIVE:  HPI  Patient presents today for follow-up on chronic issues and for medication adjustment. Patient continues to follow with wound care due to continued right AKA stump infection.   Patient recently had his PICC line removed and is currently on long-term suppressive therapy. He was told that he will most likely need to see plastic surgery and have another surgery in the near future. Patient refused at this time and does not wish to proceed with further surgery at this time. Patient states he is having significant exacerbations of his chronic pain. Has been taking all medications as prescribed without any side effect or adverse reaction. Update 12/13/2021  Patient is here to follow-up on chronic issues and for medication refills. Patient states he is doing much better since last visit. His right AKA wound is actually healing. He is being set up for prosthetic fitting and physical therapy in the near future. Patient states he is feeling better overall since last visit. No current concerns or complaints. Update 4/18/2022  Patient presents today for 4-month follow-up on chronic issues and for medication refills. Patient states he is taking all medication as prescribed without any side effects or adverse reactions. Current level of analgesia allows him to participate in activities of daily living. We again discussed the addictive potential of medication he wishes to proceed. Patient did try to take less of his pain medication for several days but did have a flareup. Currently continue to follow-up with orthotics for fitting of his right lower extremity prosthetic. After he gets adjusted to it for several hours per day he will need a referral to [facility] physical therapy for further evaluation. Review of Systems   Constitutional: Positive for fatigue. HENT: Negative for hearing loss and nosebleeds. Eyes: Negative for photophobia. Respiratory: Positive for apnea. Negative for shortness of breath. Cardiovascular: Negative for palpitations and leg swelling. Gastrointestinal: Positive for constipation. Negative for blood in stool and diarrhea. Endocrine: Negative for polydipsia.    Genitourinary: Negative for dysuria, frequency, hematuria and urgency. Musculoskeletal: Positive for arthralgias, gait problem, joint swelling and myalgias. Negative for back pain. Right Sided above-the-knee amputation revised multiple times until it is  just distal to the hip joint   Skin: Negative. Neurological: Positive for numbness. Negative for dizziness and tremors. Hematological: Does not bruise/bleed easily. Psychiatric/Behavioral: Positive for decreased concentration, dysphoric mood and sleep disturbance. Negative for hallucinations, self-injury and suicidal ideas. The patient is nervous/anxious. All other systems reviewed and are negative. Current Outpatient Medications:     cloNIDine (CATAPRES) 0.1 MG tablet, Take 1 tablet by mouth 3 times daily, Disp: 270 tablet, Rfl: 5    mirtazapine (REMERON) 7.5 MG tablet, TAKE ONE TABLET BY MOUTH ONCE NIGHTLY, Disp: 90 tablet, Rfl: 5    hydrALAZINE (APRESOLINE) 25 MG tablet, take 1 tablet by mouth three times a day, Disp: 90 tablet, Rfl: 5    cilostazol (PLETAL) 50 MG tablet, TAKE 1 TABLET BY MOUTH 2 TIMES A DAY, Disp: 180 tablet, Rfl: 3    chlorthalidone (HYGROTON) 25 MG tablet, TAKE 1 TABLET BY MOUTH ONE TIME A DAY, Disp: 90 tablet, Rfl: 1    metoprolol tartrate (LOPRESSOR) 25 MG tablet, TAKE ONE TABLET BY MOUTH TWO TIMES DAILY, Disp: 180 tablet, Rfl: 1    amLODIPine (NORVASC) 10 MG tablet, Take 1 tablet by mouth daily, Disp: 90 tablet, Rfl: 1    gabapentin (NEURONTIN) 800 MG tablet, Take 1 tablet by mouth 4 times daily for 180 days. , Disp: 360 tablet, Rfl: 1    DULoxetine (CYMBALTA) 60 MG extended release capsule, TAKE TWO CAPSULES BY MOUTH EVERY MORNING, Disp: 180 capsule, Rfl: 1    ammonium lactate (LAC-HYDRIN) 12 % lotion, Apply topically as needed. , Disp: 500 g, Rfl: 5    insulin glargine (LANTUS SOLOSTAR) 100 UNIT/ML injection pen, inject 10 unit in IN THE MORNING and 30 unit every evening, Disp: 15 mL, Rfl: 11   oxyCODONE-acetaminophen (PERCOCET) 7.5-325 MG per tablet, Take 1 tablet by mouth every 4 hours as needed for Pain for up to 30 days. , Disp: 120 tablet, Rfl: 0    oxyCODONE (OXYCONTIN) 20 MG extended release tablet, Take 1 tablet by mouth 2 times daily for 30 days.  Intended supply: 30 days, Disp: 60 tablet, Rfl: 0    ONETOUCH ULTRA strip, take 1 tablet by mouth three times a day if needed, Disp: 300 strip, Rfl: 5    doxycycline hyclate (VIBRAMYCIN) 100 MG capsule, Take 1 capsule by mouth 2 times daily, Disp: 180 capsule, Rfl: 1    naloxone 4 MG/0.1ML LIQD nasal spray, 1 spray by Nasal route as needed for Opioid Reversal, Disp: 1 each, Rfl: 5    DROPLET PEN NEEDLES 29G X 12MM MISC, 1 EACH BY DOSE NOT APPLY ROUTE DAILY, Disp: 100 each, Rfl: 5    Lancets (ONETOUCH DELICA PLUS [id number]) MISC, USE TO TEST BLOOD SUGAR FOUR TIMES A DAY, Disp: 200 each, Rfl: 5    Insulin Syringe-Needle U-100 (ULTICARE INSULIN SYRINGE) 31G X 5/16\" 0.3 ML MISC, 1 each by Other route 5 times daily, Disp: 100 each, Rfl: 3    insulin lispro (HUMALOG) 100 UNIT/ML injection vial, Inject 15 Units into the skin 3 times daily (before meals), Disp: 15 vial, Rfl: 3    mineral oil-hydrophilic petrolatum (AQUAPHOR) ointment, Apply topically twice daily dispense 14 ounce jar (Patient taking differently: Apply topically once a week ), Disp: 396 g, Rfl: 5    Handicap Placard MISC, by Does not apply route Patient cannot walk 200 ft without stopping to rest.   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    Ischemic ulcer of right thigh with necrosis of muscle (HCC)    Above knee amputation of right lower extremity (HCC)    Postoperative pain    Disruption of closure of muscle or muscle flap, sequela    Chronic pain syndrome    AKA stump complication (HCC)    Elevated sedimentation rate    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)    Major depressive disorder, single episode, unspecified    Muscle weakness (generalized)    Obstructive sleep apnea (adult) (pediatric)     Past Medical History:   Diagnosis Date    Acute kidney injury ([address] 5/18/2020    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 ([address] 5/22/2018    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 ([address] 5/21/2018    Cellulitis, scrotum 3/20/2020    Critical lower limb ischemia ([address] 3/20/2020    Diabetes mellitus ([address]     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 2 diabetes mellitus, with fat layer exposed ([address] 5/22/2018    Disruption of closure of muscle or muscle flap, sequela 8/26/2020    DVT, lower extremity (HCC)     right leg     Encounter for dental examination and cleaning with abnormal findings 4/2/2018    Gas gangrene of thigh ([address] 3/20/2020    History of DVT (deep vein thrombosis) 7/31/2018    Hyperglycemia due to type 2 diabetes mellitus ([address] 3/20/2020    Hyperlipidemia     Hypertension     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[address]     Osteomyelitis ([address] 10/24/2018    PVD (peripheral vascular disease) ([address]      Past Surgical History:   Procedure Laterality Date    AMPUTATION ABOVE KNEE Right 4/28/2020    DEBRIDEMENT OF AMPUTATION RIGHT ABOVE KNEE performed by Marcus Denny MD at [facility] Right 4/30/2020    DEBRIDEMENT OF AMPUTATION RIGHT ABOVE KNEE,  POSS. ABOVE KNEE REVISION, POSS. CLOSURE, POSS.WOUND VAC -- BEN Kay / Rae Torres TO LOOK IN performed by Marcus Denny MD at [place] [address] Right 3/20/2020    RIGHT LOWER EXTREMITY THROMBECTOMY, POSSIBLE ANGIOGRAM, POSSIBLE INTERVENTION, POSSIBLE BYPASS. performed by Marcus Denny MD at [address] Right 4/17/2020    RIGHT LEG DEBRIDEMENT INCISION AND DRAINAGE performed by Lupe Rao MD at [address] Right 4/20/2020    RIGHT THIGH WOUND DRESSING CHANGE; DEBRIDEMENT, removal of muscle performed by Lupe Rao MD at [address] Right 4/21/2020    RIGHT THIGH WOUND DRESSING CHANGE POSSIBLE  DEBRIDEMENT - NEEDS BEN Kay / JANETTE TO SEE performed by Fabio Robledo MD at [address] Right 4/23/2020    RIGHT THIGH WOUND DRESSING CHANGE and DEBRIDEMENT performed by Marcus Denny MD at [address] Right 10/15/2020    DEBRIDEMENT RIGHT ABOVE KNEE AMPUTATION POSS. REVISION POSS. WOUND VAC performed by Marcus Denny MD at [address] Right 12/17/2020    DEBRIDEMENT  RIGHT ABOVE KNEE AMPUTATION WITH POSS.  ADVANCED SKIN THERAPY performed by Marcus Denny MD at [address] Right 5/14/2021    DEBRIDEMENT RIGHT ABOVE KNEE AMPUTATION performed by Lupe Rao MD at [address] Right 5/18/2021    DEBRIDEMENT ABOVE THE KNEE AMPUTATION , WITH WOUND VAC APPLICATION performed by Marcus Denny MD at [address] [place] [address] Right 11/1/2018    AMPUTATION ABOVE KNEE RIGHT LEG performed by Marcus Denny MD at [facility]    AZ DEEP DISSEC FOOT INFEC,1 BURSA Right 5/24/2018    RIGHT FOOT INCISION AND DRAINAGE WITH PARTIAL BONE RESECTION performed by Shaista Carpio DPM at [address] [place] [address] OFFICE/OUTPT VISIT,PROCEDURE ONLY Right 8/3/2018    INCISION AND DRAINAGE MULTIPLE AREAS RIGHT FOOT WITH DEBRIDEMENT SOFT TISSUE [facility] Right     leg      Social History     Socioeconomic History    Marital status: Single     Spouse name: Not on file    Number of children: Not on file    Years of education: Not on file    Highest education level: Not on file   Occupational History    Not on file   Tobacco Use    Smoking status: Current Every Day Smoker     Packs/day: 0.50     Years: 7.00     Pack years: 3.50     Types: Cigarettes    Smokeless tobacco: Never Used   Vaping Use    Vaping Use: Never used   Substance and Sexual Activity    Alcohol use: No    Drug use: No    Sexual activity: Not on file   Other Topics Concern    Not on file   Social History Narrative    Not on file     Social Determinants of Health     Financial Resource Strain:     Difficulty of Paying Living Expenses: Not on file   Food Insecurity:     Worried About Running Out of Food in the Last Year: Not on file    Jorge of Food in the Last Year: Not on file   Transportation Needs:     Lack of Transportation (Medical): Not on file    Lack of Transportation (Non-Medical):  Not on file   Physical Activity:     Days of Exercise per Week: Not on file    Minutes of Exercise per Session: Not on file   Stress:     Feeling of Stress : Not on file   Social Connections:     Frequency of Communication with Friends and Family: Not on file    Frequency of Social Gatherings with Friends and Family: Not on file    Attends Sikhism Services: Not on file    Active Member of Clubs or Organizations: Not on file    Attends Club or Organization Meetings: Not on file    Marital Status: Not on file   Intimate Partner Violence:     Fear of Current or Ex-Partner: Not on file    Emotionally Abused: Not on file    Physically Abused: Not on file    Sexually Abused: Not on file   Housing Stability:     Unable to Pay for Housing in the Last Year: Not on file    Number of Places Lived in the Last Year: Not on file    Unstable Housing in the Last Year: Not on file     Family History   Problem Relation Age of Onset    Cancer Mother     Diabetes Father     Heart Failure Father     Hypertension Father     Cancer Sister       There are no preventive care reminders to display for this patient. There are no preventive care reminders to display for this patient. Diabetes Management   Topic Date Due    Diabetic foot exam  10/24/2019      Health Maintenance Due   Topic    DTaP/Tdap/Td vaccine (1 - Tdap)    Shingles Vaccine (1 of 2)      Health Maintenance   Topic Date Due    HIV screen  Never done    DTaP/Tdap/Td vaccine (1 - Tdap) Never done    Colorectal Cancer Screen  Never done    Shingles Vaccine (1 of 2) Never done    Diabetic foot exam  10/24/2019    Diabetic microalbuminuria test  08/12/2022    Lipid screen  08/12/2022    Depression Monitoring  09/09/2022    Annual Wellness Visit (AWV)  09/10/2022    A1C test (Diabetic or Prediabetic)  12/09/2022    Potassium monitoring  12/09/2022    Creatinine monitoring  12/09/2022    Diabetic retinal exam  03/30/2023    Pneumococcal 0-64 years Vaccine (3 - PPSV23 or PCV20) 01/01/2025    Flu vaccine  Completed    COVID-19 Vaccine  Completed    Hepatitis C screen  Completed    Hepatitis A vaccine  Aged Out    Hib vaccine  Aged Out    Meningococcal (ACWY) vaccine  Aged Out      There are no preventive care reminders to display for this patient. There are no preventive care reminders to display for this patient.          /80   Pulse 76   Temp 97.4 °F (36.3 °C)   Ht 6' 2\" (1.88 m)   SpO2 99%   BMI 31.46 kg/m²       Objective   Physical Exam  Vitals reviewed. Constitutional:       Appearance: He is obese. HENT:      Head: Normocephalic and atraumatic. Mouth/Throat:      Dentition: Abnormal dentition (edentulous upper). Eyes:      General: No scleral icterus. Conjunctiva/sclera: Conjunctivae normal.      Pupils: Pupils are equal, round, and reactive to light. Neck:      Thyroid: No thyromegaly. Cardiovascular:      Rate and Rhythm: Normal rate and regular rhythm. Heart sounds: Normal heart sounds. No murmur heard. Pulmonary:      Effort: Pulmonary effort is normal.      Breath sounds: Normal breath sounds. No rales. Abdominal:      General: Bowel sounds are decreased. There is no distension. Palpations: Abdomen is soft. Tenderness: There is no abdominal tenderness. Musculoskeletal:         General: Normal range of motion. Cervical back: Neck supple. Legs:    Lymphadenopathy:      Cervical: No cervical adenopathy. Skin:     General: Skin is warm and dry. Findings: No erythema or rash. Neurological:      Mental Status: He is alert and oriented to person, place, and time. Cranial Nerves: No cranial nerve deficit. Psychiatric:         Judgment: Judgment normal.                  An electronic signature was used to authenticate this note.     --Shaheen Kline, DO

## 2022-04-20 RX ORDER — BLOOD SUGAR DIAGNOSTIC
1 STRIP MISCELLANEOUS
Qty: 100 EACH | Refills: 3 | Status: SHIPPED
Start: 2022-04-20 | End: 2022-09-07 | Stop reason: SDUPTHER

## 2022-05-02 DIAGNOSIS — G89.4 CHRONIC PAIN SYNDROME: ICD-10-CM

## 2022-05-02 RX ORDER — OXYCODONE HCL 20 MG/1
20 TABLET, FILM COATED, EXTENDED RELEASE ORAL 2 TIMES DAILY
Qty: 60 TABLET | Refills: 0 | Status: SHIPPED
Start: 2022-05-02 | End: 2022-06-01 | Stop reason: SDUPTHER

## 2022-05-06 NOTE — PROGRESS NOTES
Post-Discharge Transitional Care Management Services or Hospital Follow Up      Gwendolyn Crews   YOB: 1955    Date of Office Visit:  5/27/2021  Date of Hospital Admission: 5/12/21  Date of Hospital Discharge: 5/20/21  Readmission Risk Score(high >=14%.  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 ([address]    Coagulopathy ([address]    Moderate protein-calorie malnutrition ([address]    PVD (peripheral vascular disease) ([address]    Leukocytosis    Stage 3 chronic kidney disease    Tobacco dependence    HLD (hyperlipidemia)    Osteomyelitis of right lower limb ([address]    S/P AKA (above knee amputation), right ([address]    History of vascular surgery    Occlusion of common femoral artery ([address]    Critical lower limb ischemia    Vascular occlusion    Wound infection    Postoperative wound infection    Ischemic ulcer of right thigh with fat layer exposed ([address]    Ischemic ulcer of right thigh with necrosis of muscle ([address]    Above knee amputation of right lower extremity ([address]    Postoperative pain    Disruption of closure of muscle or muscle flap, sequela    Chronic pain syndrome    AKA stump complication ([address]       No Known Allergies    Medications listed as ordered at the time of discharge from hospital   Davide Espinoza Medication Instructions MISHEL:    Printed on:05/27/21 5480   Medication Information                      amLODIPine (NORVASC) 10 MG tablet  Take 1 tablet by mouth daily             ammonium lactate (LAC-HYDRIN) 12 % lotion  Apply topically as needed. blood glucose test strips (ONETOUCH ULTRA) strip  1 each by Other route 3 times daily As needed. chlorthalidone (HYGROTON) 25 MG tablet  TAKE 1 TABLET BY MOUTH ONE TIME A DAY             cilostazol (PLETAL) 50 MG tablet  Take 1 tablet by mouth 2 times daily             cloNIDine (CATAPRES) 0.1 MG tablet  TAKE ONE TABLET BY MOUTH THREE TIMES DAILY             DAPTOmycin (CUBICIN) 500 MG injection  Infuse 10 mLs intravenously daily             DULoxetine (CYMBALTA) 60 MG extended release capsule  TAKE TWO CAPSULES BY MOUTH EVERY MORNING             gabapentin (NEURONTIN) 800 MG tablet  Take 1 tablet by mouth 4 times daily for 30 days. Handicap Placard MISC  by Does not apply route Patient cannot walk 200 ft without stopping to rest.    Expiration 10/21/2024             hydrALAZINE (APRESOLINE) 25 MG tablet  Take 1 tablet by mouth 3 times daily             insulin lispro (HUMALOG) 100 UNIT/ML injection vial  Inject 15 Units into the skin 3 times daily (before meals)             Insulin Syringe-Needle U-100 (ULTICARE INSULIN SYRINGE) 31G X 5/16\" 0.3 ML MISC  1 each by Other route 5 times daily             lactobacillus (CULTURELLE) CAPS capsule  Take 2 capsules by mouth daily             metoprolol tartrate (LOPRESSOR) 25 MG tablet  Take 1 tablet by mouth 2 times daily             mineral oil-hydrophilic petrolatum (AQUAPHOR) ointment  Apply topically twice daily dispense 14 ounce jar             mirtazapine (REMERON) 7.5 MG tablet  TAKE 1 TABLET BY MOUTH ONE TIME A DAY NIGHTLY             oxyCODONE (OXYCONTIN) 10 MG extended release tablet  Take 10 mg by mouth every 12 hours. oxyCODONE (OXYCONTIN) 15 MG T12A extended release tablet  Take 1 tablet by mouth every 12 hours for 30 days.              oxyCODONE (ROXICODONE) 5 MG immediate release tablet  Take 1 tablet by mouth every 6 hours as needed for Pain for up to 7 days. Intended supply: 7 days. Take lowest dose possible to manage pain             oxyCODONE-acetaminophen (PERCOCET) 7.5-325 MG per tablet  Take 1 tablet by mouth every 6 hours as needed for Pain for up to 30 days. Intended supply: 30 days                   Medications marked \"taking\" at this time  Outpatient Medications Marked as Taking for the 5/27/21 encounter (Office Visit) with Marianne Cornelius Eliud, DO   Medication Sig Dispense Refill    oxyCODONE (OXYCONTIN) 10 MG extended release tablet Take 10 mg by mouth every 12 hours.  oxyCODONE (OXYCONTIN) 15 MG T12A extended release tablet Take 1 tablet by mouth every 12 hours for 30 days. 60 tablet 0    oxyCODONE-acetaminophen (PERCOCET) 7.5-325 MG per tablet Take 1 tablet by mouth every 6 hours as needed for Pain for up to 30 days. Intended supply: 30 days 120 tablet 0    oxyCODONE (ROXICODONE) 5 MG immediate release tablet Take 1 tablet by mouth every 6 hours as needed for Pain for up to 7 days. Intended supply: 7 days. Take lowest dose possible to manage pain 28 tablet 0    lactobacillus (CULTURELLE) CAPS capsule Take 2 capsules by mouth daily 60 capsule 1    DAPTOmycin (CUBICIN) 500 MG injection Infuse 10 mLs intravenously daily 420 mL 0    DULoxetine (CYMBALTA) 60 MG extended release capsule TAKE TWO CAPSULES BY MOUTH EVERY MORNING 180 capsule 1    chlorthalidone (HYGROTON) 25 MG tablet TAKE 1 TABLET BY MOUTH ONE TIME A DAY 30 tablet 3    mirtazapine (REMERON) 7.5 MG tablet TAKE 1 TABLET BY MOUTH ONE TIME A DAY NIGHTLY 90 tablet 1    insulin lispro (HUMALOG) 100 UNIT/ML injection vial Inject 15 Units into the skin 3 times daily (before meals) (Patient taking differently: Inject 12 Units into the skin 3 times daily (before meals) For every 20 units above 200 mg/dL patient takes an additional 2 units) 15 vial 3    ammonium lactate (LAC-HYDRIN) 12 % lotion Apply topically as needed.  1 Bottle 0    metoprolol tartrate (LOPRESSOR) 25 MG tablet Take 1 tablet by mouth 2 times daily 90 tablet 1    gabapentin (NEURONTIN) 800 MG tablet Take 1 tablet by mouth 4 times daily for 30 days. 120 tablet 5    hydrALAZINE (APRESOLINE) 25 MG tablet Take 1 tablet by mouth 3 times daily 90 tablet 3    amLODIPine (NORVASC) 10 MG tablet Take 1 tablet by mouth daily 90 tablet 1    cloNIDine (CATAPRES) 0.1 MG tablet TAKE ONE TABLET BY MOUTH THREE TIMES DAILY 270 tablet 3    cilostazol (PLETAL) 50 MG tablet Take 1 tablet by mouth 2 times daily 180 tablet 1    mineral oil-hydrophilic petrolatum (AQUAPHOR) ointment Apply topically twice daily dispense 14 ounce jar (Patient taking differently: Apply topically once a week ) 396 g 5    blood glucose test strips (ONETOUCH ULTRA) strip 1 each by Other route 3 times daily As needed. 300 each 1    Insulin Syringe-Needle U-100 (ULTICARE INSULIN SYRINGE) 31G X 5/16\" 0.3 ML MISC 1 each by Other route 5 times daily 100 each 3    Handicap Placard MISC by Does not apply route Patient cannot walk 200 ft without stopping to rest.    Expiration 10/21/2024 1 each 0        Medications patient taking as of now reconciled against medications ordered at time of hospital discharge: Yes    Chief Complaint   Patient presents with    [facility]     had infection in right amputation site, 05/20/21       HPI    Inpatient course: Discharge summary reviewed- see chart. Interval history/Current status: Noted discharge patient had revision of AKA stump including treatment for chronic infection. Cultures grew MRSA. He was discharged on PICC line with daptomycin and cefepime. At this time patient is still having issues with chronic pain. He is taking all medications as prescribed but states that it is not working as it had been previously. Currently undergoing wound VAC therapy. Has sutures in place. He has not been eating much. He states that his blood sugar has been all over the place.   He also states there has been emotional lability. Review of Systems   Constitutional: Positive for fatigue. HENT: Negative for hearing loss and nosebleeds. Eyes: Negative for photophobia. Respiratory: Positive for apnea. Negative for shortness of breath. Cardiovascular: Negative for palpitations and leg swelling. Gastrointestinal: Positive for constipation. Negative for blood in stool and diarrhea. Endocrine: Negative for polydipsia. Genitourinary: Negative for dysuria, frequency, hematuria and urgency. Musculoskeletal: Positive for arthralgias, gait problem, joint swelling and myalgias. Negative for back pain. Right Sided above-the-knee amputation revised multiple times until it is  just distal to the hip joint   Skin: Negative. Neurological: Positive for numbness. Negative for dizziness and tremors. Hematological: Does not bruise/bleed easily. Psychiatric/Behavioral: Positive for decreased concentration, dysphoric mood and sleep disturbance. Negative for hallucinations, self-injury and suicidal ideas. The patient is nervous/anxious. All other systems reviewed and are negative. Vitals:    05/27/21 1403   BP: 126/82   Pulse: 81   Resp: 16   Temp: 97.8 °F (36.6 °C)   SpO2: 98%   Weight: 245 lb (111.1 kg)   Height: 6' 2\" (1.88 m)     Body mass index is 31.46 kg/m². Wt Readings from Last 3 Encounters:   05/27/21 245 lb (111.1 kg)   05/12/21 245 lb (111.1 kg)   04/28/21 245 lb (111.1 kg)     BP Readings from Last 3 Encounters:   05/27/21 126/82   05/20/21 (!) 147/70   05/18/21 133/78       Physical Exam  Vitals reviewed. HENT:      Head: Normocephalic and atraumatic. Mouth/Throat:      Dentition: Abnormal dentition (edentulous upper). Eyes:      General: No scleral icterus. Conjunctiva/sclera: Conjunctivae normal.      Pupils: Pupils are equal, round, and reactive to light. Neck:      Thyroid: No thyromegaly. Cardiovascular:      Rate and Rhythm: Normal rate and regular rhythm.       Heart sounds: Normal heart sounds. No murmur heard. Pulmonary:      Effort: Pulmonary effort is normal.      Breath sounds: Normal breath sounds. No rales. Abdominal:      General: Bowel sounds are decreased. There is no distension. Palpations: Abdomen is soft. Tenderness: There is no abdominal tenderness. Musculoskeletal:         General: Normal range of motion. Cervical back: Neck supple. Legs:       Comments: Left upper extremity PICC line in place. Lymphadenopathy:      Cervical: No cervical adenopathy. Skin:     General: Skin is warm and dry. Findings: No erythema or rash. Neurological:      Mental Status: He is alert and oriented to person, place, and time. Cranial Nerves: No cranial nerve deficit. Psychiatric:         Judgment: Judgment normal.           Assessment/Plan:   Diagnosis Orders   1. AKA stump complication (HCC)  oxyCODONE (OXYCONTIN) 15 MG T12A extended release tablet    oxyCODONE-acetaminophen (PERCOCET) 7.5-325 MG per tablet    CBC Auto Differential    Basic Metabolic Panel    Hepatic Function Panel    C-REACTIVE PROTEIN    SEDIMENTATION RATE    CK    DC DISCHARGE MEDS RECONCILED W/ CURRENT OUTPATIENT MED LIST   2. Chronic pain syndrome  oxyCODONE (OXYCONTIN) 15 MG T12A extended release tablet    oxyCODONE-acetaminophen (PERCOCET) 7.5-325 MG per tablet    CBC Auto Differential    Basic Metabolic Panel    Hepatic Function Panel    C-REACTIVE PROTEIN    SEDIMENTATION RATE    CK    DC DISCHARGE MEDS RECONCILED W/ CURRENT OUTPATIENT MED LIST   3. Postoperative wound infection  oxyCODONE (OXYCONTIN) 15 MG T12A extended release tablet    oxyCODONE-acetaminophen (PERCOCET) 7.5-325 MG per tablet    CBC Auto Differential    Basic Metabolic Panel    Hepatic Function Panel    C-REACTIVE PROTEIN    SEDIMENTATION RATE    CK    DC DISCHARGE MEDS RECONCILED W/ CURRENT OUTPATIENT MED LIST   4. Elevated sedimentation rate     5.  Uncontrolled type 2 diabetes mellitus with hyperglycemia (HCC)  insulin glargine (LANTUS SOLOSTAR) 100 UNIT/ML injection pen    Insulin Pen Needle 29G X 12MM MISC     At this time we will start him on 25 units of Lantus nightly for better blood sugar control. Patient will continue to monitor and contact the office in the next 1 to 2 weeks if still elevated. Pain medication also adjusted. Hopefully we get him on a better regimen of control to help with his therapy. Blood sugar control will also help the infection to clear up quicker and heal better. Labs ordered today for surveillance purposes given the patient's long-term medication usage. Results will be faxed to infectious disease doctor. Patient is currently on PICC line for therapy. Keegan Johnson D.O.   2:47 PM  5/27/2021       This document may have been prepared at least partially through the use of voice recognition software. Although effort is taken to assure the accuracy of this document, it is possible that grammatical, syntax,  or spelling errors may occur.           Medical Decision Making: high complexity Hydroxychloroquine Counseling:  I discussed with the patient that a baseline ophthalmologic exam is needed at the start of therapy and every year thereafter while on therapy. A CBC may also be warranted for monitoring.  The side effects of this medication were discussed with the patient, including but not limited to agranulocytosis, aplastic anemia, seizures, rashes, retinopathy, and liver toxicity. Patient instructed to call the office should any adverse effect occur.  The patient verbalized understanding of the proper use and possible adverse effects of Plaquenil.  All the patient's questions and concerns were addressed.

## 2022-05-10 DIAGNOSIS — T87.9 AKA STUMP COMPLICATION (HCC): ICD-10-CM

## 2022-05-10 DIAGNOSIS — Z79.4 CONTROLLED TYPE 2 DIABETES MELLITUS WITH OTHER SPECIFIED COMPLICATION, WITH LONG-TERM CURRENT USE OF INSULIN (HCC): ICD-10-CM

## 2022-05-10 DIAGNOSIS — G89.4 CHRONIC PAIN SYNDROME: ICD-10-CM

## 2022-05-10 DIAGNOSIS — E11.69 CONTROLLED TYPE 2 DIABETES MELLITUS WITH OTHER SPECIFIED COMPLICATION, WITH LONG-TERM CURRENT USE OF INSULIN (HCC): ICD-10-CM

## 2022-05-10 DIAGNOSIS — I73.9 PVD (PERIPHERAL VASCULAR DISEASE) (HCC): ICD-10-CM

## 2022-05-10 RX ORDER — OXYCODONE AND ACETAMINOPHEN 7.5; 325 MG/1; MG/1
1 TABLET ORAL EVERY 4 HOURS PRN
Qty: 120 TABLET | Refills: 0 | Status: SHIPPED
Start: 2022-05-10 | End: 2022-06-09 | Stop reason: SDUPTHER

## 2022-05-17 LAB — DIABETIC RETINOPATHY: NEGATIVE

## 2022-06-01 DIAGNOSIS — G89.4 CHRONIC PAIN SYNDROME: ICD-10-CM

## 2022-06-01 RX ORDER — OXYCODONE HCL 20 MG/1
20 TABLET, FILM COATED, EXTENDED RELEASE ORAL 2 TIMES DAILY
Qty: 60 TABLET | Refills: 0 | Status: SHIPPED
Start: 2022-06-01 | End: 2022-06-30 | Stop reason: SDUPTHER

## 2022-06-09 DIAGNOSIS — T87.9 AKA STUMP COMPLICATION (HCC): ICD-10-CM

## 2022-06-09 DIAGNOSIS — Z79.4 CONTROLLED TYPE 2 DIABETES MELLITUS WITH OTHER SPECIFIED COMPLICATION, WITH LONG-TERM CURRENT USE OF INSULIN (HCC): ICD-10-CM

## 2022-06-09 DIAGNOSIS — G89.4 CHRONIC PAIN SYNDROME: ICD-10-CM

## 2022-06-09 DIAGNOSIS — I73.9 PVD (PERIPHERAL VASCULAR DISEASE) (HCC): ICD-10-CM

## 2022-06-09 DIAGNOSIS — E11.69 CONTROLLED TYPE 2 DIABETES MELLITUS WITH OTHER SPECIFIED COMPLICATION, WITH LONG-TERM CURRENT USE OF INSULIN (HCC): ICD-10-CM

## 2022-06-09 RX ORDER — OXYCODONE AND ACETAMINOPHEN 7.5; 325 MG/1; MG/1
1 TABLET ORAL EVERY 4 HOURS PRN
Qty: 120 TABLET | Refills: 0 | Status: SHIPPED
Start: 2022-06-09 | End: 2022-07-11 | Stop reason: SDUPTHER

## 2022-06-30 DIAGNOSIS — G89.4 CHRONIC PAIN SYNDROME: ICD-10-CM

## 2022-06-30 RX ORDER — OXYCODONE HCL 20 MG/1
20 TABLET, FILM COATED, EXTENDED RELEASE ORAL 2 TIMES DAILY
Qty: 60 TABLET | Refills: 0 | Status: SHIPPED
Start: 2022-06-30 | End: 2022-07-28 | Stop reason: SDUPTHER

## 2022-07-11 DIAGNOSIS — E11.69 CONTROLLED TYPE 2 DIABETES MELLITUS WITH OTHER SPECIFIED COMPLICATION, WITH LONG-TERM CURRENT USE OF INSULIN (HCC): ICD-10-CM

## 2022-07-11 DIAGNOSIS — T87.9 AKA STUMP COMPLICATION (HCC): ICD-10-CM

## 2022-07-11 DIAGNOSIS — I73.9 PVD (PERIPHERAL VASCULAR DISEASE) (HCC): ICD-10-CM

## 2022-07-11 DIAGNOSIS — G89.4 CHRONIC PAIN SYNDROME: ICD-10-CM

## 2022-07-11 DIAGNOSIS — Z79.4 CONTROLLED TYPE 2 DIABETES MELLITUS WITH OTHER SPECIFIED COMPLICATION, WITH LONG-TERM CURRENT USE OF INSULIN (HCC): ICD-10-CM

## 2022-07-11 RX ORDER — OXYCODONE AND ACETAMINOPHEN 7.5; 325 MG/1; MG/1
1 TABLET ORAL EVERY 4 HOURS PRN
Qty: 120 TABLET | Refills: 0 | Status: SHIPPED
Start: 2022-07-11 | End: 2022-08-08 | Stop reason: SDUPTHER

## 2022-07-13 ENCOUNTER — TELEPHONE (OUTPATIENT)
Dept: PRIMARY CARE CLINIC | Age: 67
End: 2022-07-13

## 2022-07-14 DIAGNOSIS — I73.9 PVD (PERIPHERAL VASCULAR DISEASE) (HCC): ICD-10-CM

## 2022-07-14 DIAGNOSIS — M86.9 OSTEOMYELITIS OF RIGHT LOWER LIMB (HCC): ICD-10-CM

## 2022-07-14 DIAGNOSIS — G89.4 CHRONIC PAIN SYNDROME: ICD-10-CM

## 2022-07-14 DIAGNOSIS — E11.69 CONTROLLED TYPE 2 DIABETES MELLITUS WITH OTHER SPECIFIED COMPLICATION, WITH LONG-TERM CURRENT USE OF INSULIN (HCC): Primary | ICD-10-CM

## 2022-07-14 DIAGNOSIS — Z89.611 S/P AKA (ABOVE KNEE AMPUTATION), RIGHT (HCC): ICD-10-CM

## 2022-07-14 DIAGNOSIS — Z79.4 CONTROLLED TYPE 2 DIABETES MELLITUS WITH OTHER SPECIFIED COMPLICATION, WITH LONG-TERM CURRENT USE OF INSULIN (HCC): Primary | ICD-10-CM

## 2022-07-28 DIAGNOSIS — G89.4 CHRONIC PAIN SYNDROME: ICD-10-CM

## 2022-07-28 RX ORDER — OXYCODONE HCL 20 MG/1
20 TABLET, FILM COATED, EXTENDED RELEASE ORAL 2 TIMES DAILY
Qty: 60 TABLET | Refills: 0 | Status: SHIPPED
Start: 2022-07-28 | End: 2022-08-18 | Stop reason: ALTCHOICE

## 2022-08-08 DIAGNOSIS — T87.9 AKA STUMP COMPLICATION (HCC): ICD-10-CM

## 2022-08-08 DIAGNOSIS — I73.9 PVD (PERIPHERAL VASCULAR DISEASE) (HCC): ICD-10-CM

## 2022-08-08 DIAGNOSIS — E11.69 CONTROLLED TYPE 2 DIABETES MELLITUS WITH OTHER SPECIFIED COMPLICATION, WITH LONG-TERM CURRENT USE OF INSULIN (HCC): ICD-10-CM

## 2022-08-08 DIAGNOSIS — Z79.4 CONTROLLED TYPE 2 DIABETES MELLITUS WITH OTHER SPECIFIED COMPLICATION, WITH LONG-TERM CURRENT USE OF INSULIN (HCC): ICD-10-CM

## 2022-08-08 DIAGNOSIS — G89.4 CHRONIC PAIN SYNDROME: ICD-10-CM

## 2022-08-08 RX ORDER — OXYCODONE AND ACETAMINOPHEN 7.5; 325 MG/1; MG/1
1 TABLET ORAL EVERY 4 HOURS PRN
Qty: 120 TABLET | Refills: 0 | Status: SHIPPED
Start: 2022-08-08 | End: 2022-09-07 | Stop reason: SDUPTHER

## 2022-08-18 ENCOUNTER — OFFICE VISIT (OUTPATIENT)
Dept: PRIMARY CARE CLINIC | Age: 67
End: 2022-08-18
Payer: COMMERCIAL

## 2022-08-18 VITALS
OXYGEN SATURATION: 97 % | BODY MASS INDEX: 31.46 KG/M2 | DIASTOLIC BLOOD PRESSURE: 68 MMHG | HEIGHT: 74 IN | HEART RATE: 72 BPM | SYSTOLIC BLOOD PRESSURE: 138 MMHG | TEMPERATURE: 98.7 F

## 2022-08-18 DIAGNOSIS — Z12.12 SCREENING FOR MALIGNANT NEOPLASM OF THE RECTUM: ICD-10-CM

## 2022-08-18 DIAGNOSIS — S78.111A ABOVE KNEE AMPUTATION OF RIGHT LOWER EXTREMITY (HCC): ICD-10-CM

## 2022-08-18 DIAGNOSIS — E78.2 MIXED HYPERLIPIDEMIA: ICD-10-CM

## 2022-08-18 DIAGNOSIS — I10 HYPERTENSION, UNSPECIFIED TYPE: ICD-10-CM

## 2022-08-18 DIAGNOSIS — N18.30 STAGE 3 CHRONIC KIDNEY DISEASE, UNSPECIFIED WHETHER STAGE 3A OR 3B CKD (HCC): ICD-10-CM

## 2022-08-18 DIAGNOSIS — G89.4 CHRONIC PAIN SYNDROME: ICD-10-CM

## 2022-08-18 DIAGNOSIS — R06.9 UNSPECIFIED ABNORMALITIES OF BREATHING: ICD-10-CM

## 2022-08-18 DIAGNOSIS — Z89.611 S/P AKA (ABOVE KNEE AMPUTATION), RIGHT (HCC): ICD-10-CM

## 2022-08-18 DIAGNOSIS — G89.18 POSTOPERATIVE PAIN: ICD-10-CM

## 2022-08-18 DIAGNOSIS — Z12.5 ENCOUNTER FOR SCREENING FOR MALIGNANT NEOPLASM OF PROSTATE: ICD-10-CM

## 2022-08-18 DIAGNOSIS — E11.69 CONTROLLED TYPE 2 DIABETES MELLITUS WITH OTHER SPECIFIED COMPLICATION, WITH LONG-TERM CURRENT USE OF INSULIN (HCC): Primary | ICD-10-CM

## 2022-08-18 DIAGNOSIS — M65.352 TRIGGER LITTLE FINGER OF LEFT HAND: ICD-10-CM

## 2022-08-18 DIAGNOSIS — Z79.4 CONTROLLED TYPE 2 DIABETES MELLITUS WITH OTHER SPECIFIED COMPLICATION, WITH LONG-TERM CURRENT USE OF INSULIN (HCC): Primary | ICD-10-CM

## 2022-08-18 DIAGNOSIS — I73.9 PVD (PERIPHERAL VASCULAR DISEASE) (HCC): ICD-10-CM

## 2022-08-18 DIAGNOSIS — Z79.4 CONTROLLED TYPE 2 DIABETES MELLITUS WITH OTHER SPECIFIED COMPLICATION, WITH LONG-TERM CURRENT USE OF INSULIN (HCC): ICD-10-CM

## 2022-08-18 DIAGNOSIS — E11.69 CONTROLLED TYPE 2 DIABETES MELLITUS WITH OTHER SPECIFIED COMPLICATION, WITH LONG-TERM CURRENT USE OF INSULIN (HCC): ICD-10-CM

## 2022-08-18 PROBLEM — D68.9 COAGULOPATHY (HCC): Status: RESOLVED | Noted: 2018-05-21 | Resolved: 2022-08-18

## 2022-08-18 PROBLEM — T87.9 AKA STUMP COMPLICATION (HCC): Status: RESOLVED | Noted: 2021-05-12 | Resolved: 2022-08-18

## 2022-08-18 PROBLEM — L08.9 WOUND INFECTION: Status: RESOLVED | Noted: 2020-04-16 | Resolved: 2022-08-18

## 2022-08-18 PROBLEM — T81.328S: Chronic | Status: RESOLVED | Noted: 2020-08-26 | Resolved: 2022-08-18

## 2022-08-18 PROBLEM — T14.8XXA WOUND INFECTION: Status: RESOLVED | Noted: 2020-04-16 | Resolved: 2022-08-18

## 2022-08-18 PROBLEM — I70.65: Status: RESOLVED | Noted: 2018-05-21 | Resolved: 2022-08-18

## 2022-08-18 PROBLEM — I70.229 CRITICAL LOWER LIMB ISCHEMIA (HCC): Status: RESOLVED | Noted: 2020-03-20 | Resolved: 2022-08-18

## 2022-08-18 PROBLEM — T81.32XS: Chronic | Status: RESOLVED | Noted: 2020-08-26 | Resolved: 2022-08-18

## 2022-08-18 PROBLEM — M86.9 OSTEOMYELITIS OF RIGHT LOWER LIMB (HCC): Status: RESOLVED | Noted: 2018-10-24 | Resolved: 2022-08-18

## 2022-08-18 PROBLEM — E44.0 MODERATE PROTEIN-CALORIE MALNUTRITION (HCC): Chronic | Status: RESOLVED | Noted: 2018-05-23 | Resolved: 2022-08-18

## 2022-08-18 PROBLEM — I70.209 OCCLUSION OF COMMON FEMORAL ARTERY (HCC): Status: RESOLVED | Noted: 2020-03-20 | Resolved: 2022-08-18

## 2022-08-18 LAB
ALBUMIN SERPL-MCNC: 3.7 G/DL (ref 3.5–5.2)
ALP BLD-CCNC: 118 U/L (ref 40–129)
ALT SERPL-CCNC: 12 U/L (ref 0–40)
ANION GAP SERPL CALCULATED.3IONS-SCNC: 14 MMOL/L (ref 7–16)
AST SERPL-CCNC: 16 U/L (ref 0–39)
BACTERIA: ABNORMAL /HPF
BASOPHILS ABSOLUTE: 0.07 E9/L (ref 0–0.2)
BASOPHILS RELATIVE PERCENT: 0.7 % (ref 0–2)
BILIRUB SERPL-MCNC: 0.3 MG/DL (ref 0–1.2)
BILIRUBIN DIRECT: <0.2 MG/DL (ref 0–0.3)
BILIRUBIN URINE: NEGATIVE
BILIRUBIN, INDIRECT: NORMAL MG/DL (ref 0–1)
BLOOD, URINE: NEGATIVE
BUN BLDV-MCNC: 27 MG/DL (ref 6–23)
CALCIUM SERPL-MCNC: 9.7 MG/DL (ref 8.6–10.2)
CHLORIDE BLD-SCNC: 98 MMOL/L (ref 98–107)
CHOLESTEROL, TOTAL: 189 MG/DL (ref 0–199)
CLARITY: ABNORMAL
CO2: 23 MMOL/L (ref 22–29)
COLOR: YELLOW
CREAT SERPL-MCNC: 1.8 MG/DL (ref 0.7–1.2)
CREATININE URINE: 118 MG/DL (ref 40–278)
EOSINOPHILS ABSOLUTE: 0.12 E9/L (ref 0.05–0.5)
EOSINOPHILS RELATIVE PERCENT: 1.2 % (ref 0–6)
FOLATE: 2.7 NG/ML (ref 4.8–24.2)
GFR AFRICAN AMERICAN: 46
GFR NON-AFRICAN AMERICAN: 46 ML/MIN/1.73
GLUCOSE BLD-MCNC: 181 MG/DL (ref 74–99)
GLUCOSE URINE: 500 MG/DL
HBA1C MFR BLD: 13 % (ref 4–5.6)
HCT VFR BLD CALC: 46.5 % (ref 37–54)
HDLC SERPL-MCNC: 38 MG/DL
HEMOGLOBIN: 15.6 G/DL (ref 12.5–16.5)
IMMATURE GRANULOCYTES #: 0.03 E9/L
IMMATURE GRANULOCYTES %: 0.3 % (ref 0–5)
KETONES, URINE: NEGATIVE MG/DL
LDL CHOLESTEROL CALCULATED: 119 MG/DL (ref 0–99)
LEUKOCYTE ESTERASE, URINE: NEGATIVE
LIPASE: 72 U/L (ref 13–60)
LYMPHOCYTES ABSOLUTE: 2.75 E9/L (ref 1.5–4)
LYMPHOCYTES RELATIVE PERCENT: 28.2 % (ref 20–42)
MCH RBC QN AUTO: 27.1 PG (ref 26–35)
MCHC RBC AUTO-ENTMCNC: 33.5 % (ref 32–34.5)
MCV RBC AUTO: 80.7 FL (ref 80–99.9)
MICROALBUMIN UR-MCNC: 504.3 MG/L
MICROALBUMIN/CREAT UR-RTO: 427.4 (ref 0–30)
MONOCYTES ABSOLUTE: 0.66 E9/L (ref 0.1–0.95)
MONOCYTES RELATIVE PERCENT: 6.8 % (ref 2–12)
NEUTROPHILS ABSOLUTE: 6.13 E9/L (ref 1.8–7.3)
NEUTROPHILS RELATIVE PERCENT: 62.8 % (ref 43–80)
NITRITE, URINE: NEGATIVE
PDW BLD-RTO: 13.8 FL (ref 11.5–15)
PH UA: 6 (ref 5–9)
PLATELET # BLD: 196 E9/L (ref 130–450)
PMV BLD AUTO: 12.7 FL (ref 7–12)
POTASSIUM SERPL-SCNC: 3.9 MMOL/L (ref 3.5–5)
PRO-BNP: 284 PG/ML (ref 0–125)
PROSTATE SPECIFIC ANTIGEN: 0.64 NG/ML (ref 0–4)
PROTEIN UA: 100 MG/DL
RBC # BLD: 5.76 E12/L (ref 3.8–5.8)
RBC UA: ABNORMAL /HPF (ref 0–2)
SODIUM BLD-SCNC: 135 MMOL/L (ref 132–146)
SPECIFIC GRAVITY UA: 1.01 (ref 1–1.03)
T4 FREE: 1.01 NG/DL (ref 0.93–1.7)
TOTAL PROTEIN: 7.4 G/DL (ref 6.4–8.3)
TRIGL SERPL-MCNC: 160 MG/DL (ref 0–149)
TSH SERPL DL<=0.05 MIU/L-ACNC: 1.41 UIU/ML (ref 0.27–4.2)
URIC ACID, SERUM: 6.8 MG/DL (ref 3.4–7)
UROBILINOGEN, URINE: 0.2 E.U./DL
VITAMIN B-12: 615 PG/ML (ref 211–946)
VLDLC SERPL CALC-MCNC: 32 MG/DL
WBC # BLD: 9.8 E9/L (ref 4.5–11.5)
WBC UA: ABNORMAL /HPF (ref 0–5)
YEAST: PRESENT /HPF

## 2022-08-18 PROCEDURE — 3046F HEMOGLOBIN A1C LEVEL >9.0%: CPT | Performed by: FAMILY MEDICINE

## 2022-08-18 PROCEDURE — 4004F PT TOBACCO SCREEN RCVD TLK: CPT | Performed by: FAMILY MEDICINE

## 2022-08-18 PROCEDURE — 99214 OFFICE O/P EST MOD 30 MIN: CPT | Performed by: FAMILY MEDICINE

## 2022-08-18 PROCEDURE — 3017F COLORECTAL CA SCREEN DOC REV: CPT | Performed by: FAMILY MEDICINE

## 2022-08-18 PROCEDURE — G8417 CALC BMI ABV UP PARAM F/U: HCPCS | Performed by: FAMILY MEDICINE

## 2022-08-18 PROCEDURE — 2022F DILAT RTA XM EVC RTNOPTHY: CPT | Performed by: FAMILY MEDICINE

## 2022-08-18 PROCEDURE — G8428 CUR MEDS NOT DOCUMENT: HCPCS | Performed by: FAMILY MEDICINE

## 2022-08-18 RX ORDER — AMMONIUM LACTATE 12 G/100G
LOTION TOPICAL
Qty: 500 G | Refills: 5 | Status: SHIPPED | OUTPATIENT
Start: 2022-08-18

## 2022-08-18 RX ORDER — MIRTAZAPINE 7.5 MG/1
TABLET, FILM COATED ORAL
Qty: 90 TABLET | Refills: 5 | Status: SHIPPED | OUTPATIENT
Start: 2022-08-18

## 2022-08-18 RX ORDER — PEN NEEDLE, DIABETIC 31 GX5/16"
NEEDLE, DISPOSABLE MISCELLANEOUS
COMMUNITY
Start: 2022-05-22

## 2022-08-18 RX ORDER — OXYCODONE HCL 10 MG/1
10 TABLET, FILM COATED, EXTENDED RELEASE ORAL 2 TIMES DAILY
Qty: 60 TABLET | Refills: 0 | Status: SHIPPED
Start: 2022-08-18 | End: 2022-09-21 | Stop reason: SDUPTHER

## 2022-08-18 ASSESSMENT — PATIENT HEALTH QUESTIONNAIRE - PHQ9
6. FEELING BAD ABOUT YOURSELF - OR THAT YOU ARE A FAILURE OR HAVE LET YOURSELF OR YOUR FAMILY DOWN: 0
4. FEELING TIRED OR HAVING LITTLE ENERGY: 0
1. LITTLE INTEREST OR PLEASURE IN DOING THINGS: 0
5. POOR APPETITE OR OVEREATING: 0
SUM OF ALL RESPONSES TO PHQ QUESTIONS 1-9: 0
SUM OF ALL RESPONSES TO PHQ QUESTIONS 1-9: 0
8. MOVING OR SPEAKING SO SLOWLY THAT OTHER PEOPLE COULD HAVE NOTICED. OR THE OPPOSITE, BEING SO FIGETY OR RESTLESS THAT YOU HAVE BEEN MOVING AROUND A LOT MORE THAN USUAL: 0
SUM OF ALL RESPONSES TO PHQ9 QUESTIONS 1 & 2: 0
10. IF YOU CHECKED OFF ANY PROBLEMS, HOW DIFFICULT HAVE THESE PROBLEMS MADE IT FOR YOU TO DO YOUR WORK, TAKE CARE OF THINGS AT HOME, OR GET ALONG WITH OTHER PEOPLE: 0
SUM OF ALL RESPONSES TO PHQ QUESTIONS 1-9: 0
9. THOUGHTS THAT YOU WOULD BE BETTER OFF DEAD, OR OF HURTING YOURSELF: 0
2. FEELING DOWN, DEPRESSED OR HOPELESS: 0
7. TROUBLE CONCENTRATING ON THINGS, SUCH AS READING THE NEWSPAPER OR WATCHING TELEVISION: 0
SUM OF ALL RESPONSES TO PHQ QUESTIONS 1-9: 0
3. TROUBLE FALLING OR STAYING ASLEEP: 0

## 2022-08-18 ASSESSMENT — ENCOUNTER SYMPTOMS
PHOTOPHOBIA: 0
BLOOD IN STOOL: 0
DIARRHEA: 0
SHORTNESS OF BREATH: 0
APNEA: 1
BACK PAIN: 0
CONSTIPATION: 1

## 2022-08-18 NOTE — PROGRESS NOTES
Abdoulaye Pierce (:  1955) is a 61 y.o. male,Established patient, here for evaluation of the following chief complaint(s):  3 Month Follow-Up         ASSESSMENT/PLAN:  1. Controlled type 2 diabetes mellitus with other specified complication, with long-term current use of insulin (HCC)  -     CBC with Auto Differential; Future  -     T4, Free; Future  -     Uric Acid; Future  -     PSA Screening; Future  -     Vitamin B12 & Folate; Future  -     TSH; Future  -     Hepatic Function Panel; Future  -     Basic Metabolic Panel; Future  -     Lipid Panel; Future  -     Hemoglobin A1C; Future  -     Microalbumin / Creatinine Urine Ratio; Future  -     Urinalysis with Microscopic; Future  -     HIV Screen; Future  -     Lipase; Future  -     Brain Natriuretic Peptide; Future  2. Above knee amputation of right lower extremity (HCC)  -     mineral oil-hydrophilic petrolatum (AQUAPHOR) ointment; Apply topically once a week, Topical, WEEKLY Starting Thu 2022, Disp-396 g, R-5, Normal  -     CBC with Auto Differential; Future  -     T4, Free; Future  -     Uric Acid; Future  -     PSA Screening; Future  -     Vitamin B12 & Folate; Future  -     TSH; Future  -     Hepatic Function Panel; Future  -     Basic Metabolic Panel; Future  -     Lipid Panel; Future  -     Hemoglobin A1C; Future  -     Microalbumin / Creatinine Urine Ratio; Future  -     Urinalysis with Microscopic; Future  -     HIV Screen; Future  -     Lipase; Future  -     Brain Natriuretic Peptide; Future  3. Chronic pain syndrome  -     mineral oil-hydrophilic petrolatum (AQUAPHOR) ointment; Apply topically once a week, Topical, WEEKLY Starting Thu 2022, Disp-396 g, R-5, Normal  -     CBC with Auto Differential; Future  -     T4, Free; Future  -     Uric Acid; Future  -     PSA Screening; Future  -     Vitamin B12 & Folate; Future  -     TSH; Future  -     Hepatic Function Panel; Future  -     Basic Metabolic Panel;  Future  -     Lipid Panel; Future  -     Hemoglobin A1C; Future  -     Microalbumin / Creatinine Urine Ratio; Future  -     Urinalysis with Microscopic; Future  -     HIV Screen; Future  -     Lipase; Future  -     Brain Natriuretic Peptide; Future  4. Hypertension, unspecified type  -     CBC with Auto Differential; Future  -     T4, Free; Future  -     Uric Acid; Future  -     PSA Screening; Future  -     Vitamin B12 & Folate; Future  -     TSH; Future  -     Hepatic Function Panel; Future  -     Basic Metabolic Panel; Future  -     Lipid Panel; Future  -     Hemoglobin A1C; Future  -     Microalbumin / Creatinine Urine Ratio; Future  -     Urinalysis with Microscopic; Future  -     HIV Screen; Future  -     Lipase; Future  -     Brain Natriuretic Peptide; Future  5. PVD (peripheral vascular disease) (HCC)  -     CBC with Auto Differential; Future  -     T4, Free; Future  -     Uric Acid; Future  -     PSA Screening; Future  -     Vitamin B12 & Folate; Future  -     TSH; Future  -     Hepatic Function Panel; Future  -     Basic Metabolic Panel; Future  -     Lipid Panel; Future  -     Hemoglobin A1C; Future  -     Microalbumin / Creatinine Urine Ratio; Future  -     Urinalysis with Microscopic; Future  -     HIV Screen; Future  -     Lipase; Future  -     Brain Natriuretic Peptide; Future  6. Stage 3 chronic kidney disease, unspecified whether stage 3a or 3b CKD (HCC)  -     CBC with Auto Differential; Future  -     T4, Free; Future  -     Uric Acid; Future  -     PSA Screening; Future  -     Vitamin B12 & Folate; Future  -     TSH; Future  -     Hepatic Function Panel; Future  -     Basic Metabolic Panel; Future  -     Lipid Panel; Future  -     Hemoglobin A1C; Future  -     Microalbumin / Creatinine Urine Ratio; Future  -     Urinalysis with Microscopic; Future  -     HIV Screen; Future  -     Lipase; Future  -     Brain Natriuretic Peptide; Future  7.  Mixed hyperlipidemia  -     CBC with Auto Differential; Future  -     T4, Free; Future  -     Uric Acid; Future  -     PSA Screening; Future  -     Vitamin B12 & Folate; Future  -     TSH; Future  -     Hepatic Function Panel; Future  -     Basic Metabolic Panel; Future  -     Lipid Panel; Future  -     Hemoglobin A1C; Future  -     Microalbumin / Creatinine Urine Ratio; Future  -     Urinalysis with Microscopic; Future  -     HIV Screen; Future  -     Lipase; Future  -     Brain Natriuretic Peptide; Future  8. Encounter for screening for malignant neoplasm of prostate   -     PSA Screening; Future  9. Unspecified abnormalities of breathing   -     Brain Natriuretic Peptide; Future  10. Postoperative pain  -     oxyCODONE (OXYCONTIN) 10 MG extended release tablet; Take 1 tablet by mouth 2 times daily for 30 days. Intended supply: 30 days, Disp-60 tablet, R-0Normal  11. Screening for malignant neoplasm of the rectum  -     Fecal DNA Colorectal cancer screening (Cologuard)  12. S/P AKA (above knee amputation), right ([address]  13. Trigger little finger of left hand  -     Trudi Salcedo MD, [facility], [place] ([facility])  Baseline labs today. He will continue to follow with specialist as indicated. Currently using his orthotic only few hours a day. Cologuard ordered today as well. We will decrease pain medication on patient's request and monitor his symptoms. May increase or adjust as needed. See him back in 3 months or sooner. No follow-ups on file. Subjective   SUBJECTIVE/OBJECTIVE:  HPI    Update 4/18/2022  Patient presents today for 4-month follow-up on chronic issues and for medication refills. Patient states he is taking all medication as prescribed without any side effects or adverse reactions. Current level of analgesia allows him to participate in activities of daily living. We again discussed the addictive potential of medication he wishes to proceed. Patient did try to take less of his pain medication for several days but did have a flareup.   Currently continue to follow-up with orthotics for fitting of his right lower extremity prosthetic. After he gets adjusted to it for several hours per day he will need a referral to [facility] physical therapy for further evaluation. Update 8/18/2022  Patient presents today for 4-month follow-up on chronic issues and for medication refills. Patient states that overall he has been doing quite well. Pain is currently 3-4/10 on most days. He would actually like to get off of some of the pain medication and requests a dosage decrease from the 20 mg OxyContin to 10 mg with continued Percocet as needed. Patient has also been using his orthosis roughly 2 hours/day and has been able to stand and walk somewhat. He states that because of the orientation of the last surgery the orthosis does cause him some discomfort. He continues to have occasional phantom limb pain as well. Denies any other issues or concerns at this time. Review of Systems   Constitutional:  Positive for fatigue. HENT:  Negative for hearing loss and nosebleeds. Eyes:  Negative for photophobia. Respiratory:  Positive for apnea. Negative for shortness of breath. Cardiovascular:  Negative for palpitations and leg swelling. Gastrointestinal:  Positive for constipation. Negative for blood in stool and diarrhea. Endocrine: Negative for polydipsia. Genitourinary:  Negative for dysuria, frequency, hematuria and urgency. Musculoskeletal:  Positive for arthralgias, gait problem, joint swelling and myalgias. Negative for back pain. Right Sided above-the-knee amputation revised multiple times until it is  just distal to the hip joint   Skin: Negative. Neurological:  Positive for numbness. Negative for dizziness and tremors. Hematological:  Does not bruise/bleed easily. Psychiatric/Behavioral:  Positive for decreased concentration, dysphoric mood and sleep disturbance. Negative for hallucinations, self-injury and suicidal ideas.  The patient is MORNING, Disp: 180 capsule, Rfl: 1    insulin glargine (LANTUS SOLOSTAR) 100 UNIT/ML injection pen, inject 10 unit in IN THE MORNING and 30 unit every evening, Disp: 15 mL, Rfl: 11    ONETOUCH ULTRA strip, take 1 tablet by mouth three times a day if needed, Disp: 300 strip, Rfl: 5    naloxone 4 MG/0.1ML LIQD nasal spray, 1 spray by Nasal route as needed for Opioid Reversal, Disp: 1 each, Rfl: 5    Lancets (ONETOUCH DELICA PLUS [id number]) MISC, USE TO TEST BLOOD SUGAR FOUR TIMES A DAY, Disp: 200 each, Rfl: 5    lactobacillus (CULTURELLE) CAPS capsule, Take 2 capsules by mouth daily, Disp: 60 capsule, Rfl: 1    insulin lispro (HUMALOG) 100 UNIT/ML injection vial, Inject 15 Units into the skin 3 times daily (before meals), Disp: 15 vial, Rfl: 3    Handicap Placard MISC, by Does not apply route Patient cannot walk 200 ft without stopping to rest.   Expiration 10/21/2024, Disp: 1 each, Rfl: 0   Patient Active Problem List   Diagnosis    DM II (diabetes mellitus, type II), controlled ([address]    HTN (hypertension)    PVD (peripheral vascular disease) (HCC)    Leukocytosis    Stage 3 chronic kidney disease (HCC)    Tobacco dependence    HLD (hyperlipidemia)    S/P AKA (above knee amputation), right (HCC)    History of vascular surgery    Above knee amputation of right lower extremity (HCC)    Postoperative pain    Chronic pain syndrome    Elevated sedimentation rate    Major depressive disorder, single episode, unspecified    Muscle weakness (generalized)    Obstructive sleep apnea (adult) (pediatric)     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[address] 1/16/2020    Acute kidney injury ([address] 5/18/2020    AKA stump complication ([address] 9/51/3169    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 ([address] 5/22/2018    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 ([address] 5/21/2018    Cellulitis, scrotum 3/20/2020    Coagulopathy ([address] 5/21/2018    Critical lower limb ischemia ([address] 3/20/2020    Diabetes mellitus ([address]     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 2 diabetes mellitus, with fat layer exposed ([address] 5/22/2018    Disruption of closure of muscle or muscle flap, sequela 8/26/2020    DVT, lower extremity ([address]     right leg     Encounter for dental examination and cleaning with abnormal findings 4/2/2018    Gas gangrene of thigh ([address] 3/20/2020    History of DVT (deep vein thrombosis) 7/31/2018    Hyperglycemia due to type 2 diabetes mellitus ([address] 3/20/2020    Hyperlipidemia     Hypertension     Ischemic ulcer of right thigh with fat layer exposed ([address]     Ischemic ulcer of right thigh with necrosis of muscle (HCC)     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[address]     Moderate protein-calorie malnutrition ([address] 5/23/2018    Occlusion of common femoral artery ([address] 3/20/2020    Osteomyelitis ([address] 10/24/2018    Osteomyelitis of right lower limb ([address] 10/24/2018    Postoperative wound infection     PVD (peripheral vascular disease) ([address]     Vascular occlusion     Wound infection 4/16/2020     Past Surgical History:   Procedure Laterality Date    AMPUTATION ABOVE KNEE Right 4/28/2020    DEBRIDEMENT OF AMPUTATION RIGHT ABOVE KNEE performed by Aye Hansen MD at [address] [facility] Right 4/30/2020    DEBRIDEMENT OF AMPUTATION RIGHT ABOVE KNEE,  POSS. ABOVE KNEE REVISION, POSS. CLOSURE, POSS.WOUND VAC -- DRS. [address] [facility] / [address] [facility] performed by Aye Hansen MD at [facility] Right 3/20/2020    RIGHT LOWER EXTREMITY THROMBECTOMY, POSSIBLE ANGIOGRAM, POSSIBLE INTERVENTION, POSSIBLE BYPASS.  performed by Aye Hansen MD at [address] Right 4/17/2020    RIGHT LEG DEBRIDEMENT INCISION AND DRAINAGE performed by Axel Fernandez MD at [address] Right 4/20/2020    RIGHT THIGH WOUND DRESSING CHANGE; DEBRIDEMENT, removal of muscle performed by Sekou Rodriguez MD at [address] [facility] Right 4/21/2020    RIGHT THIGH WOUND DRESSING CHANGE POSSIBLE  DEBRIDEMENT - NEEDS BEN Toribio / JANETTE TO SEE performed by Cecilia Garcia MD at [address] [facility] Right 4/23/2020    RIGHT THIGH WOUND DRESSING CHANGE and DEBRIDEMENT performed by Abelardo Goode MD at [address] [place] [address] Right 10/15/2020    DEBRIDEMENT RIGHT ABOVE KNEE AMPUTATION POSS. REVISION POSS. WOUND VAC performed by Abelardo Goode MD at [address] [place] [address] Right 12/17/2020    DEBRIDEMENT  RIGHT ABOVE KNEE AMPUTATION WITH POSS.  ADVANCED SKIN THERAPY performed by Abelardo Goode MD at [address] [facility] Right 5/14/2021    DEBRIDEMENT RIGHT ABOVE KNEE AMPUTATION performed by Sekou Rodriguez MD at [address] [place] [address] Right 5/18/2021    DEBRIDEMENT ABOVE THE KNEE AMPUTATION , WITH WOUND VAC APPLICATION performed by Abelardo Goode MD at [address] Right 11/1/2018    AMPUTATION ABOVE KNEE RIGHT LEG performed by Abelardo Goode MD at [address] Right 5/24/2018    RIGHT FOOT INCISION AND DRAINAGE WITH PARTIAL BONE RESECTION performed by Kena Brewster DPM at [facility] OFFICE/OUTPT VISIT,PROCEDURE ONLY Right 8/3/2018    INCISION AND DRAINAGE MULTIPLE AREAS RIGHT FOOT WITH DEBRIDEMENT SOFT TISSUE performed by Kena Brewstre DPM at [address] Right     leg      Social History     Socioeconomic History    Marital status: Single     Spouse name: Not on file    Number of children: Not on file    Years of education: Not on file    Highest education level: Not on file   Occupational History    Not on file   Tobacco Use    Smoking status: Every Day     Packs/day: 0.50     Years: 7.00     Pack years: 3.50     Types: Cigarettes    Smokeless tobacco: Never   Vaping Use    Vaping Use: Never used   Substance and Sexual Activity    Alcohol use: No    Drug use: No    Sexual activity: Not on file   Other Topics Concern    Not on file   Social History Narrative    Not on file     Social Determinants of Health     Financial Resource Strain: Not on file   Food Insecurity: Not on file   Transportation Needs: Not on file   Physical Activity: Not on file   Stress: Not on file   Social Connections: Not on file   Intimate Partner Violence: Not on file   Housing Stability: Not on file     Family History   Problem Relation Age of Onset    Cancer Mother     Diabetes Father     Heart Failure Father     Hypertension Father     Cancer Sister       There are no preventive care reminders to display for this patient. There are no preventive care reminders to display for this patient. Diabetes Management   Topic Date Due    Diabetic foot exam  10/24/2019    Diabetic microalbuminuria test  08/12/2022    Lipids  08/12/2022      Health Maintenance Due   Topic    DTaP/Tdap/Td vaccine (1 - Tdap)    Shingles vaccine (1 of 2)      Health Maintenance   Topic Date Due    HIV screen  Never done    DTaP/Tdap/Td vaccine (1 - Tdap) Never done    Colorectal Cancer Screen  Never done    Shingles vaccine (1 of 2) Never done    Diabetic foot exam  10/24/2019    COVID-19 Vaccine (4 - Booster for Pfizer series) 04/28/2022    Diabetic microalbuminuria test  08/12/2022    Lipids  08/12/2022    Depression Monitoring  09/09/2022    Annual Wellness Visit (AWV)  09/10/2022    Flu vaccine (1) 09/01/2022    A1C test (Diabetic or Prediabetic)  12/09/2022    Diabetic retinal exam  05/17/2024    Pneumococcal 0-64 years Vaccine (3 - PPSV23 or PCV20) 01/01/2025    Hepatitis C screen  Completed    Hepatitis A vaccine  Aged Out    Hib vaccine  Aged Out    Meningococcal (ACWY) vaccine  Aged Out      There are no preventive care reminders to display for this patient. There are no preventive care reminders to display for this patient.          /68   Pulse 72   Temp 98.7 °F (37.1 °C)   Ht 6' 2\" (1.88 m)   SpO2 97%   BMI 31.46 kg/m²       Objective   Physical Exam  Vitals reviewed. Constitutional:       Appearance: He is obese. HENT:      Head: Normocephalic and atraumatic. Mouth/Throat:      Dentition: Abnormal dentition (edentulous upper). Eyes:      General: No scleral icterus. Conjunctiva/sclera: Conjunctivae normal.      Pupils: Pupils are equal, round, and reactive to light. Neck:      Thyroid: No thyromegaly. Cardiovascular:      Rate and Rhythm: Normal rate and regular rhythm. Heart sounds: Normal heart sounds. No murmur heard. Pulmonary:      Effort: Pulmonary effort is normal.      Breath sounds: Normal breath sounds. No rales. Abdominal:      General: Bowel sounds are decreased. There is no distension. Palpations: Abdomen is soft. Tenderness: There is no abdominal tenderness. Musculoskeletal:         General: Normal range of motion. Cervical back: Neck supple. Legs:    Lymphadenopathy:      Cervical: No cervical adenopathy. Skin:     General: Skin is warm and dry. Findings: No erythema or rash. Neurological:      Mental Status: He is alert and oriented to person, place, and time. Cranial Nerves: No cranial nerve deficit. Psychiatric:         Judgment: Judgment normal.                An electronic signature was used to authenticate this note.     --Oskar Kline,

## 2022-08-19 ENCOUNTER — TELEPHONE (OUTPATIENT)
Dept: PRIMARY CARE CLINIC | Age: 67
End: 2022-08-19

## 2022-08-19 DIAGNOSIS — E11.69 CONTROLLED TYPE 2 DIABETES MELLITUS WITH OTHER SPECIFIED COMPLICATION, WITH LONG-TERM CURRENT USE OF INSULIN (HCC): Primary | ICD-10-CM

## 2022-08-19 DIAGNOSIS — G47.33 OSA ON CPAP: Primary | ICD-10-CM

## 2022-08-19 DIAGNOSIS — Z99.89 OSA ON CPAP: Primary | ICD-10-CM

## 2022-08-19 DIAGNOSIS — G47.33 OBSTRUCTIVE SLEEP APNEA (ADULT) (PEDIATRIC): Primary | ICD-10-CM

## 2022-08-19 DIAGNOSIS — E11.65 UNCONTROLLED TYPE 2 DIABETES MELLITUS WITH HYPERGLYCEMIA (HCC): ICD-10-CM

## 2022-08-19 DIAGNOSIS — Z79.4 CONTROLLED TYPE 2 DIABETES MELLITUS WITH OTHER SPECIFIED COMPLICATION, WITH LONG-TERM CURRENT USE OF INSULIN (HCC): Primary | ICD-10-CM

## 2022-08-19 DIAGNOSIS — G47.33 OSA (OBSTRUCTIVE SLEEP APNEA): ICD-10-CM

## 2022-08-19 RX ORDER — INSULIN LISPRO 100 [IU]/ML
INJECTION, SOLUTION INTRAVENOUS; SUBCUTANEOUS
Qty: 15 ML | Refills: 5 | Status: SHIPPED | OUTPATIENT
Start: 2022-08-19

## 2022-08-19 NOTE — TELEPHONE ENCOUNTER
When patient was in office yesterday he mentioned that he still hasn't heard anything from [facility] about a new CPAP machine. According to the orders in the chart, it looks like CPAP supplies were ordered but never a new machine. Can you order?

## 2022-08-20 LAB — HIV-1 AND HIV-2 ANTIBODIES: NORMAL

## 2022-08-26 NOTE — TELEPHONE ENCOUNTER
Fax received from [facility] stating that the order must have the pressure setting on the order. Per fax, \"we can not go off of previous script, the pressure has to be written on the current script by a physician. \"

## 2022-08-29 ENCOUNTER — TELEPHONE (OUTPATIENT)
Dept: PRIMARY CARE CLINIC | Age: 67
End: 2022-08-29

## 2022-08-29 DIAGNOSIS — G47.33 OSA ON CPAP: ICD-10-CM

## 2022-08-29 DIAGNOSIS — Z99.89 OSA ON CPAP: ICD-10-CM

## 2022-08-29 DIAGNOSIS — G47.33 OSA (OBSTRUCTIVE SLEEP APNEA): Primary | ICD-10-CM

## 2022-08-29 NOTE — TELEPHONE ENCOUNTER
[address] needs resent to include the pressure settings. Order says \"patients previous settings\", but the actual settings need to be on the order. They also need insurance faxed to them also.     Fax# [phone]

## 2022-08-30 ENCOUNTER — TELEPHONE (OUTPATIENT)
Dept: PRIMARY CARE CLINIC | Age: 67
End: 2022-08-30

## 2022-08-30 DIAGNOSIS — N18.30 STAGE 3 CHRONIC KIDNEY DISEASE, UNSPECIFIED WHETHER STAGE 3A OR 3B CKD (HCC): Primary | ICD-10-CM

## 2022-08-30 NOTE — TELEPHONE ENCOUNTER
The pt is calling back and says \"his settings vary anywhere from 5-10 cmH2O in 30 minute intervals\"

## 2022-08-30 NOTE — TELEPHONE ENCOUNTER
The pt's kidney labs came back elevated and you wanted him to see his nephrologist but he does not have one, he is asking if he can be referred to see one please

## 2022-09-06 DIAGNOSIS — E11.69 CONTROLLED TYPE 2 DIABETES MELLITUS WITH OTHER SPECIFIED COMPLICATION, WITH LONG-TERM CURRENT USE OF INSULIN (HCC): ICD-10-CM

## 2022-09-06 DIAGNOSIS — I73.9 PVD (PERIPHERAL VASCULAR DISEASE) (HCC): ICD-10-CM

## 2022-09-06 DIAGNOSIS — G89.4 CHRONIC PAIN SYNDROME: ICD-10-CM

## 2022-09-06 DIAGNOSIS — T87.9 AKA STUMP COMPLICATION (HCC): ICD-10-CM

## 2022-09-06 DIAGNOSIS — Z79.4 CONTROLLED TYPE 2 DIABETES MELLITUS WITH OTHER SPECIFIED COMPLICATION, WITH LONG-TERM CURRENT USE OF INSULIN (HCC): ICD-10-CM

## 2022-09-07 RX ORDER — BLOOD SUGAR DIAGNOSTIC
1 STRIP MISCELLANEOUS
Qty: 500 EACH | Refills: 3 | Status: SHIPPED | OUTPATIENT
Start: 2022-09-07

## 2022-09-07 RX ORDER — OXYCODONE AND ACETAMINOPHEN 7.5; 325 MG/1; MG/1
1 TABLET ORAL EVERY 4 HOURS PRN
Qty: 120 TABLET | Refills: 0 | Status: SHIPPED
Start: 2022-09-07 | End: 2022-10-03 | Stop reason: SDUPTHER

## 2022-09-21 DIAGNOSIS — G89.18 POSTOPERATIVE PAIN: ICD-10-CM

## 2022-09-21 RX ORDER — OXYCODONE HCL 10 MG/1
10 TABLET, FILM COATED, EXTENDED RELEASE ORAL 2 TIMES DAILY
Qty: 60 TABLET | Refills: 0 | Status: SHIPPED
Start: 2022-09-21 | End: 2022-10-13 | Stop reason: SDUPTHER

## 2022-10-03 DIAGNOSIS — E11.69 CONTROLLED TYPE 2 DIABETES MELLITUS WITH OTHER SPECIFIED COMPLICATION, WITH LONG-TERM CURRENT USE OF INSULIN (HCC): ICD-10-CM

## 2022-10-03 DIAGNOSIS — I73.9 PVD (PERIPHERAL VASCULAR DISEASE) (HCC): ICD-10-CM

## 2022-10-03 DIAGNOSIS — Z79.4 CONTROLLED TYPE 2 DIABETES MELLITUS WITH OTHER SPECIFIED COMPLICATION, WITH LONG-TERM CURRENT USE OF INSULIN (HCC): ICD-10-CM

## 2022-10-03 DIAGNOSIS — G89.4 CHRONIC PAIN SYNDROME: ICD-10-CM

## 2022-10-03 DIAGNOSIS — T87.9 AKA STUMP COMPLICATION (HCC): ICD-10-CM

## 2022-10-03 RX ORDER — OXYCODONE AND ACETAMINOPHEN 7.5; 325 MG/1; MG/1
1 TABLET ORAL EVERY 4 HOURS PRN
Qty: 120 TABLET | Refills: 0 | Status: SHIPPED
Start: 2022-10-03 | End: 2022-11-01 | Stop reason: SDUPTHER

## 2022-10-13 DIAGNOSIS — G89.18 POSTOPERATIVE PAIN: ICD-10-CM

## 2022-10-13 RX ORDER — OXYCODONE HCL 10 MG/1
10 TABLET, FILM COATED, EXTENDED RELEASE ORAL 2 TIMES DAILY
Qty: 60 TABLET | Refills: 0 | Status: SHIPPED | OUTPATIENT
Start: 2022-10-13 | End: 2022-11-12

## 2022-10-20 RX ORDER — LANCETS 33 GAUGE
EACH MISCELLANEOUS
Qty: 120 EACH | Refills: 11 | Status: SHIPPED | OUTPATIENT
Start: 2022-10-20

## 2022-10-31 DIAGNOSIS — E11.69 CONTROLLED TYPE 2 DIABETES MELLITUS WITH OTHER SPECIFIED COMPLICATION, WITH LONG-TERM CURRENT USE OF INSULIN (HCC): ICD-10-CM

## 2022-10-31 DIAGNOSIS — T87.9 AKA STUMP COMPLICATION (HCC): ICD-10-CM

## 2022-10-31 DIAGNOSIS — I73.9 PVD (PERIPHERAL VASCULAR DISEASE) (HCC): ICD-10-CM

## 2022-10-31 DIAGNOSIS — Z79.4 CONTROLLED TYPE 2 DIABETES MELLITUS WITH OTHER SPECIFIED COMPLICATION, WITH LONG-TERM CURRENT USE OF INSULIN (HCC): ICD-10-CM

## 2022-10-31 DIAGNOSIS — G89.4 CHRONIC PAIN SYNDROME: ICD-10-CM

## 2022-11-01 RX ORDER — OXYCODONE AND ACETAMINOPHEN 7.5; 325 MG/1; MG/1
1 TABLET ORAL EVERY 4 HOURS PRN
Qty: 120 TABLET | Refills: 0 | Status: SHIPPED
Start: 2022-11-01 | End: 2022-11-28 | Stop reason: SDUPTHER

## 2022-11-13 DIAGNOSIS — I10 HYPERTENSION, UNSPECIFIED TYPE: ICD-10-CM

## 2022-11-14 RX ORDER — HYDRALAZINE HYDROCHLORIDE 25 MG/1
TABLET, FILM COATED ORAL
Qty: 90 TABLET | Refills: 5 | Status: SHIPPED | OUTPATIENT
Start: 2022-11-14

## 2022-11-18 ENCOUNTER — OFFICE VISIT (OUTPATIENT)
Dept: PRIMARY CARE CLINIC | Age: 67
End: 2022-11-18
Payer: COMMERCIAL

## 2022-11-18 VITALS
DIASTOLIC BLOOD PRESSURE: 76 MMHG | HEART RATE: 76 BPM | BODY MASS INDEX: 31.46 KG/M2 | HEIGHT: 74 IN | OXYGEN SATURATION: 98 % | SYSTOLIC BLOOD PRESSURE: 134 MMHG | TEMPERATURE: 98.1 F

## 2022-11-18 DIAGNOSIS — S78.111A ABOVE KNEE AMPUTATION OF RIGHT LOWER EXTREMITY (HCC): ICD-10-CM

## 2022-11-18 DIAGNOSIS — I73.9 PVD (PERIPHERAL VASCULAR DISEASE) (HCC): ICD-10-CM

## 2022-11-18 DIAGNOSIS — Z23 NEED FOR INFLUENZA VACCINATION: ICD-10-CM

## 2022-11-18 DIAGNOSIS — Z79.4 CONTROLLED TYPE 2 DIABETES MELLITUS WITH OTHER SPECIFIED COMPLICATION, WITH LONG-TERM CURRENT USE OF INSULIN (HCC): Primary | ICD-10-CM

## 2022-11-18 DIAGNOSIS — N18.30 STAGE 3 CHRONIC KIDNEY DISEASE, UNSPECIFIED WHETHER STAGE 3A OR 3B CKD (HCC): ICD-10-CM

## 2022-11-18 DIAGNOSIS — G89.4 CHRONIC PAIN SYNDROME: ICD-10-CM

## 2022-11-18 DIAGNOSIS — E11.69 CONTROLLED TYPE 2 DIABETES MELLITUS WITH OTHER SPECIFIED COMPLICATION, WITH LONG-TERM CURRENT USE OF INSULIN (HCC): Primary | ICD-10-CM

## 2022-11-18 DIAGNOSIS — Z89.611 S/P AKA (ABOVE KNEE AMPUTATION), RIGHT (HCC): ICD-10-CM

## 2022-11-18 DIAGNOSIS — Z13.6 SCREENING FOR CARDIOVASCULAR CONDITION: ICD-10-CM

## 2022-11-18 DIAGNOSIS — M62.81 MUSCLE WEAKNESS (GENERALIZED): ICD-10-CM

## 2022-11-18 DIAGNOSIS — Z00.00 MEDICARE ANNUAL WELLNESS VISIT, SUBSEQUENT: ICD-10-CM

## 2022-11-18 LAB — HBA1C MFR BLD: 8.7 %

## 2022-11-18 PROCEDURE — 3074F SYST BP LT 130 MM HG: CPT | Performed by: FAMILY MEDICINE

## 2022-11-18 PROCEDURE — 3017F COLORECTAL CA SCREEN DOC REV: CPT | Performed by: FAMILY MEDICINE

## 2022-11-18 PROCEDURE — G8482 FLU IMMUNIZE ORDER/ADMIN: HCPCS | Performed by: FAMILY MEDICINE

## 2022-11-18 PROCEDURE — 3052F HG A1C>EQUAL 8.0%<EQUAL 9.0%: CPT | Performed by: FAMILY MEDICINE

## 2022-11-18 PROCEDURE — G0439 PPPS, SUBSEQ VISIT: HCPCS | Performed by: FAMILY MEDICINE

## 2022-11-18 PROCEDURE — G0446 INTENS BEHAVE THER CARDIO DX: HCPCS | Performed by: FAMILY MEDICINE

## 2022-11-18 PROCEDURE — 90674 CCIIV4 VAC NO PRSV 0.5 ML IM: CPT | Performed by: FAMILY MEDICINE

## 2022-11-18 PROCEDURE — 83036 HEMOGLOBIN GLYCOSYLATED A1C: CPT | Performed by: FAMILY MEDICINE

## 2022-11-18 PROCEDURE — G0447 BEHAVIOR COUNSEL OBESITY 15M: HCPCS | Performed by: FAMILY MEDICINE

## 2022-11-18 PROCEDURE — G0008 ADMIN INFLUENZA VIRUS VAC: HCPCS | Performed by: FAMILY MEDICINE

## 2022-11-18 PROCEDURE — 3078F DIAST BP <80 MM HG: CPT | Performed by: FAMILY MEDICINE

## 2022-11-18 RX ORDER — OXYCODONE HCL 10 MG/1
10 TABLET, FILM COATED, EXTENDED RELEASE ORAL EVERY 12 HOURS
COMMUNITY

## 2022-11-18 RX ORDER — OXYCODONE HYDROCHLORIDE 15 MG/1
1 TABLET, FILM COATED, EXTENDED RELEASE ORAL EVERY 12 HOURS
Qty: 60 TABLET | Refills: 0 | Status: SHIPPED | OUTPATIENT
Start: 2022-11-18 | End: 2022-12-18

## 2022-11-18 ASSESSMENT — PATIENT HEALTH QUESTIONNAIRE - PHQ9
2. FEELING DOWN, DEPRESSED OR HOPELESS: 0
10. IF YOU CHECKED OFF ANY PROBLEMS, HOW DIFFICULT HAVE THESE PROBLEMS MADE IT FOR YOU TO DO YOUR WORK, TAKE CARE OF THINGS AT HOME, OR GET ALONG WITH OTHER PEOPLE: 0
5. POOR APPETITE OR OVEREATING: 0
SUM OF ALL RESPONSES TO PHQ QUESTIONS 1-9: 0
SUM OF ALL RESPONSES TO PHQ QUESTIONS 1-9: 0
1. LITTLE INTEREST OR PLEASURE IN DOING THINGS: 0
6. FEELING BAD ABOUT YOURSELF - OR THAT YOU ARE A FAILURE OR HAVE LET YOURSELF OR YOUR FAMILY DOWN: 0
SUM OF ALL RESPONSES TO PHQ QUESTIONS 1-9: 0
7. TROUBLE CONCENTRATING ON THINGS, SUCH AS READING THE NEWSPAPER OR WATCHING TELEVISION: 0
4. FEELING TIRED OR HAVING LITTLE ENERGY: 0
3. TROUBLE FALLING OR STAYING ASLEEP: 0
SUM OF ALL RESPONSES TO PHQ QUESTIONS 1-9: 0
SUM OF ALL RESPONSES TO PHQ9 QUESTIONS 1 & 2: 0
9. THOUGHTS THAT YOU WOULD BE BETTER OFF DEAD, OR OF HURTING YOURSELF: 0
8. MOVING OR SPEAKING SO SLOWLY THAT OTHER PEOPLE COULD HAVE NOTICED. OR THE OPPOSITE, BEING SO FIGETY OR RESTLESS THAT YOU HAVE BEEN MOVING AROUND A LOT MORE THAN USUAL: 0

## 2022-11-18 ASSESSMENT — LIFESTYLE VARIABLES
HOW MANY STANDARD DRINKS CONTAINING ALCOHOL DO YOU HAVE ON A TYPICAL DAY: PATIENT DOES NOT DRINK
HOW OFTEN DO YOU HAVE A DRINK CONTAINING ALCOHOL: NEVER

## 2022-11-18 NOTE — PROGRESS NOTES
Medicare Annual Wellness Visit    Celestnio Gómez is here for Medicare AWV (Order for power scooter/wheelchair. Also asking for order for physical therapy )    Assessment & Plan   Controlled type 2 diabetes mellitus with other specified complication, with long-term current use of insulin (HCC)  -     POCT glycosylated hemoglobin (Hb A1C)  -     RI Behavior  obesity 15m []  Need for influenza vaccination  -     Influenza, FLUCELVAX, (age 10 mo+), IM, Preservative Free, 0.5 mL  -     RI Behavior  obesity 15m []  Body mass index (BMI) 31.0-31.9, adult   -     RI Behavior  obesity 15m []  Screening for cardiovascular condition  -     RI Intens behave ther cardio dx, 15 minutes []  -     RI Behavior  obesity 15m []  Medicare annual wellness visit, subsequent  -     RI Behavior  obesity 15m []  PVD (peripheral vascular disease) ([facility]  -     [facility], [place], [address]     DME Order for [facility]) as OP  Stage 3 chronic kidney disease, unspecified whether stage 3a or 3b CKD ([facility]  S/P AKA (above knee amputation), right ([facility]  -     [address] [place] [address], [place], [address]     DME Order for [facility]) as OP  -     oxyCODONE (OXYCONTIN) 15 MG T12A extended release tablet; Take 1 tablet by mouth in the morning and 1 tablet in the evening. Do all this for 30 days. , Disp-60 tablet, R-0Normal  Above knee amputation of right lower extremity ([facility]  -     [facility], [place], [address]     DME Order for [facility]) as OP  -     oxyCODONE (OXYCONTIN) 15 MG T12A extended release tablet; Take 1 tablet by mouth in the morning and 1 tablet in the evening. Do all this for 30 days. , Disp-60 tablet, R-0Normal  Chronic pain syndrome  -     [facility] [address]     DME Order for [facility]) as OP  -     oxyCODONE (OXYCONTIN) 15 MG T12A extended release tablet;  Take 1 tablet by mouth in the morning and 1 tablet in the evening. Do all this for 30 days. , Disp-60 tablet, R-0Normal  Muscle weakness (generalized)  -     [facility], [place]     DME Order for [facility]) as OP    Recommendations for Ready To Travel Due: see orders and patient instructions/AVS.  Recommended screening schedule for the next 5-10 years is provided to the patient in written form: see Patient Instructions/AVS.     Return for Medicare Annual Wellness Visit in 1 year. Subjective   The following acute and/or chronic problems were also addressed today:  Amirah Sen today for evaluation of chronic issues and for annual wellness visit. Patient has been doing very well in regards to his diabetes. A1c has come down significantly from 13-8.7. He has been watching his sugar much better and states that he is feeling better overall. He does have follow-up appointments with orthopedics and nephrology upcoming. He does need a prescription for a power chair due to continued and worsening ambulatory status and inability to use his previous prosthetic on a regular basis. Denies any other issues or concerns at this time. Has had a history of frequent falls in the past.    Patient's complete Health Risk Assessment and screening values have been reviewed and are found in Flowsheets. The following problems were reviewed today and where indicated follow up appointments were made and/or referrals ordered.     Positive Risk Factor Screenings with Interventions:    Fall Risk:  Do you feel unsteady or are you worried about falling? : (!) yes  2 or more falls in past year?: no  Fall with injury in past year?: no   Fall Risk Interventions:    Home safety tips provided  Physical therapy referral ordered for strength and balance training      Tobacco Use:  Tobacco Use: High Risk    Smoking Tobacco Use: Every Day    Smokeless Tobacco Use: Never    Passive Exposure: Not on file     E-cigarette/Vaping       Questions diet provided      ADLs:  In the past 7 days, did you need help from others to perform any of the following everyday activities: Eating, dressing, grooming, bathing, toileting, or walking/balance?: (!) Yes  Select all that apply: (!) Walking/Balance, Dressing, Grooming, Bathing  In the past 7 days, did you need help from others to take care of any of the following: Laundry, housekeeping, banking/finances, shopping, telephone use, food preparation, transportation, or taking medications?: (!) Yes  Select all that apply: (!) Food Preparation, Transportation, Housekeeping, Laundry  ADL Interventions:  Patient declines any further evaluation/treatment for this issue          Objective   Vitals:    11/18/22 1518   BP: 134/76   Pulse: 76   Temp: 98.1 °F (36.7 °C)   SpO2: 98%   Height: 6' 2\" (1.88 m)      Body mass index is 31.46 kg/m². Physical Exam  Vitals reviewed. Constitutional:       Appearance: He is obese. HENT:      Head: Normocephalic and atraumatic. Mouth/Throat:      Dentition: Abnormal dentition (edentulous upper). Eyes:      General: No scleral icterus. Conjunctiva/sclera: Conjunctivae normal.      Pupils: Pupils are equal, round, and reactive to light. Neck:      Thyroid: No thyromegaly. Cardiovascular:      Rate and Rhythm: Normal rate and regular rhythm. Heart sounds: Normal heart sounds. No murmur heard. Pulmonary:      Effort: Pulmonary effort is normal.      Breath sounds: Normal breath sounds. No rales. Abdominal:      General: Bowel sounds are decreased. There is no distension. Palpations: Abdomen is soft. Tenderness: There is no abdominal tenderness. Musculoskeletal:         General: Normal range of motion. Cervical back: Neck supple. Legs:    Lymphadenopathy:      Cervical: No cervical adenopathy. Skin:     General: Skin is warm and dry. Findings: No erythema or rash.    Neurological:      Mental Status: He is alert and oriented to person, place, and time. Cranial Nerves: No cranial nerve deficit. Psychiatric:         Judgment: Judgment normal.            No Known Allergies  Prior to Visit Medications    Medication Sig Taking? Authorizing Provider   oxyCODONE (OXYCONTIN) 10 MG extended release tablet Take 10 mg by mouth in the morning and 10 mg in the evening. Yes Historical Provider, MD   oxyCODONE (OXYCONTIN) 15 MG T12A extended release tablet Take 1 tablet by mouth in the morning and 1 tablet in the evening. Do all this for 30 days. Yes Juanito Kline, DO   oxyCODONE-acetaminophen (PERCOCET) 7.5-325 MG per tablet Take 1 tablet by mouth every 4 hours as needed for Pain for up to 30 days. Juanito Kline, DO   Insulin Syringe-Needle U-100 ([employer]ICARE INSULIN SYRINGE) 31G X 5/16\" 0.3 ML MISC 1 each by Other route 5 times daily  Juanito Kline DO   doxycycline hyclate (VIBRAMYCIN) 100 MG capsule Take 1 capsule by mouth 2 times daily  SHONDA Oliveira - CNP   hydrALAZINE (APRESOLINE) 25 MG tablet take 1 tablet by mouth three times a day  Juanito Kline, DO   Lancets (ONETOUCH DELICA PLUS [id number]) MISC use 1 LANCET to TEST BLOOD SUGAR four times a day  Juanito Kline DO   insulin lispro, 1 Unit Dial, (HUMALOG KWIKPEN) 100 UNIT/ML SOPN Check Blood Sugar 3-4 times daily and Cover as follows:  =No Insulin, 121-150=2 units,  151-200=5 units, 201-250=9 units, 251-300=12 units, 301-350=15 units, 351-400=18 units, Over 400 give 18 units and call Physician max 60u/d  Juanito Kline DO   DROPLET PEN NEEDLES 31G X 8 MM MISC use 1 PEN NEEDLE to inject MEDICATION subcutaneously five times a day  Historical Provider, MD   mirtazapine (REMERON) 7.5 MG tablet TAKE ONE TABLET BY MOUTH ONCE NIGHTLY  Juanito Kline DO   mineral oil-hydrophilic petrolatum (AQUAPHOR) ointment Apply topically once a week  Juanito Kline DO   ammonium lactate (LAC-HYDRIN) 12 % lotion Apply topically as needed.   Juanito A Deist, DO   cloNIDine (CATAPRES) 0.1 MG tablet Take 1 tablet by mouth 3 times daily  Juanito Kline, DO   cilostazol (PLETAL) 50 MG tablet TAKE 1 TABLET BY MOUTH 2 TIMES A DAY  Juanito Kline DO   chlorthalidone (HYGROTON) 25 MG tablet TAKE 1 TABLET BY MOUTH ONE TIME A DAY  Juanito Kline, DO   metoprolol tartrate (LOPRESSOR) 25 MG tablet TAKE ONE TABLET BY MOUTH TWO TIMES DAILY  Juanito Kline, DO   amLODIPine (NORVASC) 10 MG tablet Take 1 tablet by mouth daily  Juanito Kline DO   gabapentin (NEURONTIN) 800 MG tablet Take 1 tablet by mouth 4 times daily for 180 days.   Juanito Kline DO   DULoxetine (CYMBALTA) 60 MG extended release capsule TAKE TWO CAPSULES BY MOUTH EVERY MORNING  Juanito Kline DO   insulin glargine (LANTUS SOLOSTAR) 100 UNIT/ML injection pen inject 10 unit in IN THE MORNING and 30 unit every evening  Juanito Kline DO   ONETOUCH ULTRA strip take 1 tablet by mouth three times a day if needed  Juanito Kline, DO   naloxone 4 MG/0.1ML LIQD nasal spray 1 spray by Nasal route as needed for Opioid Reversal  Juanito Kline DO   lactobacillus (CULTURELLE) CAPS capsule Take 2 capsules by mouth daily  Anaid Aguilera MD   insulin lispro (HUMALOG) 100 UNIT/ML injection vial Inject 15 Units into the skin 3 times daily (before meals)  Juanito Kline DO   Handicap Placard MISC by Does not apply route Patient cannot walk 200 ft without stopping to rest.    Expiration 10/21/2024  Juanito Kline DO       CareTeam (Including outside providers/suppliers regularly involved in providing care):   Patient Care Team:  Missy Vicente DO as PCP - General (Family Medicine)  Missy Vicente DO as PCP - [facility]  Anaid Aguilera MD as Consulting Physician (Infectious Diseases)  Diana Hackett MD as Physician (Vascular Surgery)     Reviewed and updated this visit:  Tobacco  Allergies  Meds  Problems  Med Hx  Surg Hx  Soc Hx  Fam Hx                 Cardiovascular Disease Risk Counseling: Assessed the patient's risk to develop cardiovascular disease and reviewed main risk factors. Reviewed steps to reduce disease risk including:   Quitting tobacco use, reducing amount smoked, or not starting the habit  Making healthy food choices  Being physically active and gradualy increasing activity levels   Reduce weight and determine a healthy BMI goal  Monitor blood pressure and treat if higher than 140/90 mmHg  Maintain blood total cholesterol levels under 5 mmol/l or 190 mg/dl  Maintain LDL cholesterol levels under 3.0 mmol/l or 115 mg/dl   Control blood glucose levels  Consider taking aspirin (75 mg daily), once blood pressure is controlled   Provided a follow up plan. Time spent (minutes): 10    Obesity Counseling: Assessed behavioral health risks and factors affecting choice of behavior. Suggested weight control approaches, including dietary changes behavioral modification and follow up plan. Provided educational and support documentation.   Time spent (minutes): 10

## 2022-11-18 NOTE — PATIENT INSTRUCTIONS
Body Mass Index: Care Instructions  Your Care Instructions     Body mass index (BMI) can help you see if your weight is raising your risk for health problems. It uses a formula to compare how much you weigh with how tall you are. A BMI lower than 18.5 is considered underweight. A BMI between 18.5 and 24.9 is considered healthy. A BMI between 25 and 29.9 is considered overweight. A BMI of 30 or higher is considered obese. If your BMI is in the normal range, it means that you have a lower risk for weight-related health problems. If your BMI is in the overweight or obese range, you may be at increased risk for weight-related health problems, such as high blood pressure, heart disease, stroke, arthritis or joint pain, and diabetes. If your BMI is in the underweight range, you may be at increased risk for health problems such as fatigue, lower protection (immunity) against illness, muscle loss, bone loss, hair loss, and hormone problems. BMI is just one measure of your risk for weight-related health problems. You may be at higher risk for health problems if you are not active, you eat an unhealthy diet, or you drink too much alcohol or use tobacco products. Follow-up care is a key part of your treatment and safety. Be sure to make and go to all appointments, and call your doctor if you are having problems. It's also a good idea to know your test results and keep a list of the medicines you take. How can you care for yourself at home? Practice healthy eating habits. This includes eating plenty of fruits, vegetables, whole grains, lean protein, and low-fat dairy. If your doctor recommends it, get more exercise. Walking is a good choice. Bit by bit, increase the amount you walk every day. Try for at least 30 minutes on most days of the week. Do not smoke. Smoking can increase your risk for health problems. If you need help quitting, talk to your doctor about stop-smoking programs and medicines.  These can increase your chances of quitting for good. Limit alcohol to 2 drinks a day for men and 1 drink a day for women. Too much alcohol can cause health problems. If you have a BMI higher than 25  Your doctor may do other tests to check your risk for weight-related health problems. This may include measuring the distance around your waist. A waist measurement of more than 40 inches in men or 35 inches in women can increase the risk of weight-related health problems. Talk with your doctor about steps you can take to stay healthy or improve your health. You may need to make lifestyle changes to lose weight and stay healthy, such as changing your diet and getting regular exercise. If you have a BMI lower than 18.5  Your doctor may do other tests to check your risk for health problems. Talk with your doctor about steps you can take to stay healthy or improve your health. You may need to make lifestyle changes to gain or maintain weight and stay healthy, such as getting more healthy foods in your diet and doing exercises to build muscle. Where can you learn more? Go to https://Epiphytehaley.[employer]. org and sign in to your Content360 account. Enter S176 in the Guanri box to learn more about \"Body Mass Index: Care Instructions. \"     If you do not have an account, please click on the \"Sign Up Now\" link. Current as of: December 27, 2021               Content Version: 13.4  © 2006-2022 Healthwise, Incorporated. Care instructions adapted under license by [facility] ([facility]). If you have questions about a medical condition or this instruction, always ask your healthcare professional. [place] [address] any warranty or liability for your use of this information. Eating Healthy Foods: Care Instructions  Your Care Instructions     Eating healthy foods can help lower your risk for disease.  Healthy food gives you energy and keeps your heart strong, your brain active, your muscles working, and your bones strong. A healthy diet includes a variety of foods from the basic food groups: grains, vegetables, fruits, milk and milk products, and meat and beans. Some people may eat more of their favorite foods from only one food group and, as a result, miss getting the nutrients they need. So, it is important to pay attention not only to what you eat but also to what you are missing from your diet. You can eat a healthy, balanced diet by making a few small changes. Follow-up care is a key part of your treatment and safety. Be sure to make and go to all appointments, and call your doctor if you are having problems. It's also a good idea to know your test results and keep a list of the medicines you take. How can you care for yourself at home? Look at what you eat  Keep a food diary for a week or two and record everything you eat or drink. Track the number of servings you eat from each food group. For a balanced diet every day, eat a variety of:  6 or more ounce-equivalents of grains, such as cereals, breads, crackers, rice, or pasta, every day. An ounce-equivalent is 1 slice of bread, 1 cup of ready-to-eat cereal, or ½ cup of cooked rice, cooked pasta, or cooked cereal.  2½ cups of vegetables, especially:  Dark-green vegetables such as broccoli and spinach. Orange vegetables such as carrots and sweet potatoes. Dry beans (such as cyr and kidney beans) and peas (such as lentils). 2 cups of fresh, frozen, or canned fruit. A small apple or 1 banana or orange equals 1 cup.  3 cups of nonfat or low-fat milk, yogurt, or other milk products. 5½ ounces of meat and beans, such as chicken, fish, lean meat, beans, nuts, and seeds. One egg, 1 tablespoon of peanut butter, ½ ounce nuts or seeds, or ¼ cup of cooked beans equals 1 ounce of meat. Learn how to read food labels for serving sizes and ingredients. Fast-food and convenience-food meals often contain few or no fruits or vegetables.  Make sure you eat some fruits and vegetables to make the meal more nutritious. Look at your food diary. For each food group, add up what you have eaten and then divide the total by the number of days. This will give you an idea of how much you are eating from each food group. See if you can find some ways to change your diet to make it more healthy. Start small  Do not try to make dramatic changes to your diet all at once. You might feel that you are missing out on your favorite foods and then be more likely to fail. Start slowly, and gradually change your habits. Try some of the following:  Use whole wheat bread instead of white bread. Use nonfat or low-fat milk instead of whole milk. Eat brown rice instead of white rice, and eat whole wheat pasta instead of white-flour pasta. Try low-fat cheeses and low-fat yogurt. Add more fruits and vegetables to meals and have them for snacks. Add lettuce, tomato, cucumber, and onion to sandwiches. Add fruit to yogurt and cereal.  Enjoy food  You can still eat your favorite foods. You just may need to eat less of them. If your favorite foods are high in fat, salt, and sugar, limit how often you eat them, but do not cut them out entirely. Eat a wide variety of foods. Make healthy choices when eating out  The type of restaurant you choose can help you make healthy choices. Even fast-food chains are now offering more low-fat or healthier choices on the menu. Choose smaller portions, or take half of your meal home. When eating out, try:  A veggie pizza with a whole wheat crust or grilled chicken (instead of sausage or pepperoni). Pasta with roasted vegetables, grilled chicken, or marinara sauce instead of cream sauce. A vegetable wrap or grilled chicken wrap. Broiled or poached food instead of fried or breaded items.   Make healthy choices easy  Buy packaged, prewashed, ready-to-eat fresh vegetables and fruits, such as baby carrots, salad mixes, and chopped or shredded broccoli and cauliflower. Buy packaged, presliced fruits, such as melon or pineapple. Choose 100% fruit or vegetable juice instead of soda. Limit juice intake to 4 to 6 oz (½ to ¾ cup) a day. Blend low-fat yogurt, fruit juice, and canned or frozen fruit to make a smoothie for breakfast or a snack. Where can you learn more? Go to https://Page365peFindProzeweb.[employer]. org and sign in to your boarding pass account. Enter U703 in the MetaStat box to learn more about \"Eating Healthy Foods: Care Instructions. \"     If you do not have an account, please click on the \"Sign Up Now\" link. Current as of: May 9, 2022               Content Version: 13.4  © 4135-8355 Healthwise, Incorporated. Care instructions adapted under license by [facility] ([facility]). If you have questions about a medical condition or this instruction, always ask your healthcare professional. [place] [address] any warranty or liability for your use of this information. Personalized Preventive Plan for Kyra Fox - 11/18/2022  Medicare offers a range of preventive health benefits. Some of the tests and screenings are paid in full while other may be subject to a deductible, co-insurance, and/or copay. Some of these benefits include a comprehensive review of your medical history including lifestyle, illnesses that may run in your family, and various assessments and screenings as appropriate. After reviewing your medical record and screening and assessments performed today your provider may have ordered immunizations, labs, imaging, and/or referrals for you. A list of these orders (if applicable) as well as your Preventive Care list are included within your After Visit Summary for your review. Other Preventive Recommendations:    A preventive eye exam performed by an eye specialist is recommended every 1-2 years to screen for glaucoma; cataracts, macular degeneration, and other eye disorders.   A preventive dental visit is recommended every 6 months. Try to get at least 150 minutes of exercise per week or 10,000 steps per day on a pedometer . Order or download the FREE \"Exercise & Physical Activity: Your Everyday Guide\" from [employer]. Call 5-302.605.3154 or search [employer] online. You need 0051-3701 mg of calcium and 1413-1100 IU of vitamin D per day. It is possible to meet your calcium requirement with diet alone, but a vitamin D supplement is usually necessary to meet this goal.  When exposed to the sun, use a sunscreen that protects against both UVA and UVB radiation with an SPF of 30 or greater. Reapply every 2 to 3 hours or after sweating, drying off with a towel, or swimming. Always wear a seat belt when traveling in a car. Always wear a helmet when riding a bicycle or motorcycle.

## 2022-11-23 ENCOUNTER — CLINICAL DOCUMENTATION (OUTPATIENT)
Dept: SPIRITUAL SERVICES | Age: 67
End: 2022-11-23

## 2022-11-23 NOTE — ACP (ADVANCE CARE PLANNING)
Advance Care Planning   Ambulatory ACP Specialist Patient Outreach    Date:  11/23/2022  ACP Specialist:  Yemi Kaur    Outreach call to patient in follow-up to ACP Specialist referral from: Yasemin Cheatham, DO    [x] PCP  [] Provider   [] Ambulatory Care Management [] Other for Reason:    [x] Advance Directive Assistance  [] Code Status Discussion  [] Complete Portable DNR Order  [] Discuss Goals of Care  [] Complete POST/MOST  [] Early ACP Decision-Making  [] Other    Date Referral Received:11/22/2022    Today's Outreach:  [x] First   [] Second  [] Third                               Third outreach made by []  phone  [] email []   [employer]     Intervention:  [] Spoke with Patient  [x] Left VM requesting return call      Outcome:Mailed ACP documents       Next Step:   [] ACP scheduled conversation  [x] Outreach again in one week               [] Email / Mail [address] [place]  [] Email / Mail Advance Directive            [] Close Referral. Routing closure to referring provider/staff and to ACP Specialist . [] Closure Letter mailed to Patient with Invitation to Contact ACP Specialist if/when ready.     Thank you for this referral.

## 2022-11-28 DIAGNOSIS — G89.4 CHRONIC PAIN SYNDROME: ICD-10-CM

## 2022-11-28 DIAGNOSIS — I73.9 PVD (PERIPHERAL VASCULAR DISEASE) (HCC): ICD-10-CM

## 2022-11-28 DIAGNOSIS — T87.9 AKA STUMP COMPLICATION (HCC): ICD-10-CM

## 2022-11-28 DIAGNOSIS — E11.69 CONTROLLED TYPE 2 DIABETES MELLITUS WITH OTHER SPECIFIED COMPLICATION, WITH LONG-TERM CURRENT USE OF INSULIN (HCC): ICD-10-CM

## 2022-11-28 DIAGNOSIS — Z79.4 CONTROLLED TYPE 2 DIABETES MELLITUS WITH OTHER SPECIFIED COMPLICATION, WITH LONG-TERM CURRENT USE OF INSULIN (HCC): ICD-10-CM

## 2022-11-28 RX ORDER — OXYCODONE AND ACETAMINOPHEN 7.5; 325 MG/1; MG/1
1 TABLET ORAL EVERY 4 HOURS PRN
Qty: 120 TABLET | Refills: 0 | Status: SHIPPED | OUTPATIENT
Start: 2022-11-28 | End: 2022-12-28

## 2022-12-05 DIAGNOSIS — M86.9 OSTEOMYELITIS OF RIGHT LOWER LIMB (HCC): ICD-10-CM

## 2022-12-05 LAB
ALBUMIN SERPL-MCNC: 3.7 G/DL (ref 3.5–5.2)
ALP BLD-CCNC: 109 U/L (ref 40–129)
ALT SERPL-CCNC: 12 U/L (ref 0–40)
ANION GAP SERPL CALCULATED.3IONS-SCNC: 13 MMOL/L (ref 7–16)
AST SERPL-CCNC: 23 U/L (ref 0–39)
BASOPHILS ABSOLUTE: 0.06 E9/L (ref 0–0.2)
BASOPHILS RELATIVE PERCENT: 0.7 % (ref 0–2)
BILIRUB SERPL-MCNC: 0.2 MG/DL (ref 0–1.2)
BUN BLDV-MCNC: 28 MG/DL (ref 6–23)
C-REACTIVE PROTEIN: 1.6 MG/DL (ref 0–0.4)
CALCIUM SERPL-MCNC: 9.1 MG/DL (ref 8.6–10.2)
CHLORIDE BLD-SCNC: 105 MMOL/L (ref 98–107)
CO2: 24 MMOL/L (ref 22–29)
CREAT SERPL-MCNC: 2 MG/DL (ref 0.7–1.2)
EOSINOPHILS ABSOLUTE: 0.08 E9/L (ref 0.05–0.5)
EOSINOPHILS RELATIVE PERCENT: 0.9 % (ref 0–6)
GFR SERPL CREATININE-BSD FRML MDRD: 36 ML/MIN/1.73
GLUCOSE BLD-MCNC: 273 MG/DL (ref 74–99)
HCT VFR BLD CALC: 49.4 % (ref 37–54)
HEMOGLOBIN: 15.8 G/DL (ref 12.5–16.5)
IMMATURE GRANULOCYTES #: 0.04 E9/L
IMMATURE GRANULOCYTES %: 0.4 % (ref 0–5)
LYMPHOCYTES ABSOLUTE: 2.46 E9/L (ref 1.5–4)
LYMPHOCYTES RELATIVE PERCENT: 27.3 % (ref 20–42)
MCH RBC QN AUTO: 26.6 PG (ref 26–35)
MCHC RBC AUTO-ENTMCNC: 32 % (ref 32–34.5)
MCV RBC AUTO: 83 FL (ref 80–99.9)
MONOCYTES ABSOLUTE: 0.57 E9/L (ref 0.1–0.95)
MONOCYTES RELATIVE PERCENT: 6.3 % (ref 2–12)
NEUTROPHILS ABSOLUTE: 5.81 E9/L (ref 1.8–7.3)
NEUTROPHILS RELATIVE PERCENT: 64.4 % (ref 43–80)
PDW BLD-RTO: 14.6 FL (ref 11.5–15)
PLATELET # BLD: 220 E9/L (ref 130–450)
PMV BLD AUTO: 12.6 FL (ref 7–12)
POTASSIUM SERPL-SCNC: 3.9 MMOL/L (ref 3.5–5)
RBC # BLD: 5.95 E12/L (ref 3.8–5.8)
SODIUM BLD-SCNC: 142 MMOL/L (ref 132–146)
TOTAL PROTEIN: 7.6 G/DL (ref 6.4–8.3)
WBC # BLD: 9 E9/L (ref 4.5–11.5)

## 2022-12-06 DIAGNOSIS — E11.69 CONTROLLED TYPE 2 DIABETES MELLITUS WITH OTHER SPECIFIED COMPLICATION, WITH LONG-TERM CURRENT USE OF INSULIN (HCC): ICD-10-CM

## 2022-12-06 DIAGNOSIS — I73.9 PVD (PERIPHERAL VASCULAR DISEASE) (HCC): ICD-10-CM

## 2022-12-06 DIAGNOSIS — Z79.4 CONTROLLED TYPE 2 DIABETES MELLITUS WITH OTHER SPECIFIED COMPLICATION, WITH LONG-TERM CURRENT USE OF INSULIN (HCC): ICD-10-CM

## 2022-12-06 DIAGNOSIS — Z89.611 S/P AKA (ABOVE KNEE AMPUTATION), RIGHT (HCC): ICD-10-CM

## 2022-12-06 LAB — SEDIMENTATION RATE, ERYTHROCYTE: 13 MM/HR (ref 0–15)

## 2022-12-06 RX ORDER — GABAPENTIN 800 MG/1
TABLET ORAL
Qty: 360 TABLET | Refills: 1 | Status: SHIPPED
Start: 2022-12-06 | End: 2022-12-14 | Stop reason: SDUPTHER

## 2022-12-09 ENCOUNTER — TELEPHONE (OUTPATIENT)
Dept: PRIMARY CARE CLINIC | Age: 67
End: 2022-12-09

## 2022-12-09 DIAGNOSIS — Z79.4 CONTROLLED TYPE 2 DIABETES MELLITUS WITH OTHER SPECIFIED COMPLICATION, WITH LONG-TERM CURRENT USE OF INSULIN (HCC): Primary | ICD-10-CM

## 2022-12-09 DIAGNOSIS — E11.69 CONTROLLED TYPE 2 DIABETES MELLITUS WITH OTHER SPECIFIED COMPLICATION, WITH LONG-TERM CURRENT USE OF INSULIN (HCC): Primary | ICD-10-CM

## 2022-12-09 DIAGNOSIS — I73.9 PVD (PERIPHERAL VASCULAR DISEASE) (HCC): ICD-10-CM

## 2022-12-09 DIAGNOSIS — Z89.611 S/P AKA (ABOVE KNEE AMPUTATION), RIGHT (HCC): ICD-10-CM

## 2022-12-09 DIAGNOSIS — G89.4 CHRONIC PAIN SYNDROME: ICD-10-CM

## 2022-12-09 NOTE — TELEPHONE ENCOUNTER
Order for power scooter. Would like it sent to [facility]. [facility] does not have power scooter. Also asking for order for \"ROHO SEAT CUSHION\" to prevent any sores. That would need sent to [facility].

## 2022-12-14 ENCOUNTER — TELEPHONE (OUTPATIENT)
Dept: PRIMARY CARE CLINIC | Age: 67
End: 2022-12-14

## 2022-12-14 DIAGNOSIS — I73.9 PVD (PERIPHERAL VASCULAR DISEASE) (HCC): ICD-10-CM

## 2022-12-14 DIAGNOSIS — E11.69 CONTROLLED TYPE 2 DIABETES MELLITUS WITH OTHER SPECIFIED COMPLICATION, WITH LONG-TERM CURRENT USE OF INSULIN (HCC): ICD-10-CM

## 2022-12-14 DIAGNOSIS — Z79.4 CONTROLLED TYPE 2 DIABETES MELLITUS WITH OTHER SPECIFIED COMPLICATION, WITH LONG-TERM CURRENT USE OF INSULIN (HCC): ICD-10-CM

## 2022-12-14 DIAGNOSIS — Z89.611 S/P AKA (ABOVE KNEE AMPUTATION), RIGHT (HCC): ICD-10-CM

## 2022-12-14 DIAGNOSIS — I10 HYPERTENSION, UNSPECIFIED TYPE: ICD-10-CM

## 2022-12-14 DIAGNOSIS — G89.4 CHRONIC PAIN SYNDROME: Primary | ICD-10-CM

## 2022-12-14 RX ORDER — CHLORTHALIDONE 25 MG/1
TABLET ORAL
Qty: 90 TABLET | Refills: 1 | Status: SHIPPED | OUTPATIENT
Start: 2022-12-14

## 2022-12-14 RX ORDER — OXYCODONE HCL 10 MG/1
10 TABLET, FILM COATED, EXTENDED RELEASE ORAL EVERY 12 HOURS
Qty: 60 EACH | Refills: 0 | Status: SHIPPED | OUTPATIENT
Start: 2022-12-14 | End: 2023-01-13

## 2022-12-14 RX ORDER — GABAPENTIN 800 MG/1
TABLET ORAL
Qty: 360 TABLET | Refills: 1 | Status: SHIPPED | OUTPATIENT
Start: 2022-12-14 | End: 2023-06-12

## 2022-12-14 RX ORDER — DULOXETIN HYDROCHLORIDE 60 MG/1
CAPSULE, DELAYED RELEASE ORAL
Qty: 180 CAPSULE | Refills: 1 | Status: SHIPPED | OUTPATIENT
Start: 2022-12-14

## 2022-12-14 NOTE — TELEPHONE ENCOUNTER
----- Message from Armaan Wooten sent at 12/14/2022  1:17 PM EST -----  Subject: Refill Request    QUESTIONS  Name of Medication? chlorthalidone (HYGROTON) 25 MG tablet  Patient-reported dosage and instructions? 25 MG Tablets  How many days do you have left? 0  Preferred Pharmacy? [address] [place] [address] #[id number]  Pharmacy phone number (if available)? [phone]  Additional Information for Provider? Pt of Dr. Luis Chris called to request   refill on meds. Has about 6 days remaining. Prefers [facility] pharmacy.  ---------------------------------------------------------------------------  --------------,  Name of Medication? gabapentin (NEURONTIN) 800 MG tablet  Patient-reported dosage and instructions? 800 MG  How many days do you have left? 6  Preferred Pharmacy? [address] [place] [address] #[id number]  Pharmacy phone number (if available)? [phone]  Additional Information for Provider? Pt of Luis Mai called to request   refill on meds. Has about 6 days remaining. Prefers [facility] pharmacy.  ---------------------------------------------------------------------------  --------------,  Name of Medication? DULoxetine (CYMBALTA) 60 MG extended release capsule  Patient-reported dosage and instructions? 60 MG  How many days do you have left? 7  Preferred Pharmacy? [address] [place] [address] #[id number]  Pharmacy phone number (if available)? [phone]  Additional Information for Provider? Pt of Luis Mai called to request   refill on meds. Has about 7 days remaining. Prefers [facility] pharmacy.  ---------------------------------------------------------------------------  --------------,  Name of Medication? oxyCODONE (OXYCONTIN) 10 MG extended release tablet  Patient-reported dosage and instructions? 10 MG  How many days do you have left? 8  Preferred Pharmacy? [facility] #[id number]  Pharmacy phone number (if available)? [phone]  Additional Information for Provider? Pt of Dr. Luis Chris called to request   refill on meds.  Has about 10 days remaining. Prefers [facility] on [address].  ---------------------------------------------------------------------------  --------------  [address]  What is the best way for the office to contact you? OK to leave message on   voicemail  Preferred Call Back Phone Number? [phone]  ---------------------------------------------------------------------------  --------------  SCRIPT ANSWERS  Relationship to Patient?  Self

## 2022-12-14 NOTE — TELEPHONE ENCOUNTER
Please fax DME order from [phone] to [facility].   Please fax DME order from 12/14/2022 to [facility]

## 2022-12-21 NOTE — ACP (ADVANCE CARE PLANNING)
Advance Care Planning   Ambulatory ACP Specialist Patient Outreach    Date:  11/23/2022  ACP Specialist:  Yasmine Patel    Outreach call to patient in follow-up to ACP Specialist referral from: Chaim Bautista DO    [x] PCP  [] Provider   [] Ambulatory Care Management [] Other for Reason:    [x] Advance Directive Assistance  [] Code Status Discussion  [] Complete Portable DNR Order  [] Discuss Goals of Care  [] Complete POST/MOST  [] Early ACP Decision-Making  [] Other    Date Referral Received: 11/22/2022    Today's Outreach:  [] First   [] Second  [x] Third                               Third outreach made by []  phone  [] email []   [employer]     Intervention:  [] Spoke with Patient  [x] Left VM requesting return call      Outcome: Three attempts have been made to contact the patient without success. I am closing the referral. I will reopen if the patient reaches out. Next Step:   [] ACP scheduled conversation  [] Outreach again in one week               [] Email / Mail ACP Info Sheets  [] Email / Mail Advance Directive            [x] Close Referral. Routing closure to referring provider/staff and to ACP Specialist . [] Closure Letter mailed to Patient with Invitation to Contact ACP Specialist if/when ready.     Thank you for this referral.

## 2022-12-28 DIAGNOSIS — T87.9 AKA STUMP COMPLICATION (HCC): ICD-10-CM

## 2022-12-28 DIAGNOSIS — Z79.4 CONTROLLED TYPE 2 DIABETES MELLITUS WITH OTHER SPECIFIED COMPLICATION, WITH LONG-TERM CURRENT USE OF INSULIN (HCC): ICD-10-CM

## 2022-12-28 DIAGNOSIS — G89.4 CHRONIC PAIN SYNDROME: ICD-10-CM

## 2022-12-28 DIAGNOSIS — I73.9 PVD (PERIPHERAL VASCULAR DISEASE) (HCC): ICD-10-CM

## 2022-12-28 DIAGNOSIS — E11.69 CONTROLLED TYPE 2 DIABETES MELLITUS WITH OTHER SPECIFIED COMPLICATION, WITH LONG-TERM CURRENT USE OF INSULIN (HCC): ICD-10-CM

## 2022-12-30 ENCOUNTER — HOSPITAL ENCOUNTER (OUTPATIENT)
Dept: PHYSICAL THERAPY | Age: 67
Setting detail: THERAPIES SERIES
Discharge: HOME | End: 2022-12-30

## 2022-12-30 NOTE — PROGRESS NOTES
[facility]                Phone: [phone]  Fax: [phone]    Physical Therapy  Cancellation/No-show Note  Patient Name:  Abdoulaye Pierce  :  1955   Date:  2022    For today's appointment patient:  []  Cancelled  []  Rescheduled appointment  [x]  No-show     Reason given by patient:  []  Patient ill  []  Conflicting appointment  []  No transportation    []  Conflict with work  [x]  No reason given  []  Other:     Comments:  pt was a no-show for initial evaluation. Electronically signed by:   Faiza Chase PT

## 2022-12-31 RX ORDER — OXYCODONE AND ACETAMINOPHEN 7.5; 325 MG/1; MG/1
1 TABLET ORAL EVERY 4 HOURS PRN
Qty: 120 TABLET | Refills: 0 | Status: SHIPPED | OUTPATIENT
Start: 2022-12-31 | End: 2023-01-30

## 2023-01-16 ENCOUNTER — APPOINTMENT (OUTPATIENT)
Dept: CT IMAGING | Age: 68
End: 2023-01-16
Payer: COMMERCIAL

## 2023-01-16 ENCOUNTER — HOSPITAL ENCOUNTER (EMERGENCY)
Age: 68
Discharge: HOME HEALTH | End: 2023-01-16
Attending: EMERGENCY MEDICINE
Payer: COMMERCIAL

## 2023-01-16 VITALS
TEMPERATURE: 97.4 F | OXYGEN SATURATION: 98 % | HEART RATE: 88 BPM | SYSTOLIC BLOOD PRESSURE: 176 MMHG | DIASTOLIC BLOOD PRESSURE: 83 MMHG | RESPIRATION RATE: 18 BRPM

## 2023-01-16 DIAGNOSIS — T87.9 AKA STUMP COMPLICATION (HCC): ICD-10-CM

## 2023-01-16 DIAGNOSIS — R10.9 FLANK PAIN: Primary | ICD-10-CM

## 2023-01-16 DIAGNOSIS — G89.4 CHRONIC PAIN SYNDROME: Primary | ICD-10-CM

## 2023-01-16 DIAGNOSIS — G89.18 POSTOPERATIVE PAIN: ICD-10-CM

## 2023-01-16 LAB
ALBUMIN SERPL-MCNC: 3.5 G/DL (ref 3.5–5.2)
ALP BLD-CCNC: 114 U/L (ref 40–129)
ALT SERPL-CCNC: 17 U/L (ref 0–40)
ANION GAP SERPL CALCULATED.3IONS-SCNC: 13 MMOL/L (ref 7–16)
AST SERPL-CCNC: 22 U/L (ref 0–39)
BACTERIA: ABNORMAL /HPF
BASOPHILS ABSOLUTE: 0.09 E9/L (ref 0–0.2)
BASOPHILS RELATIVE PERCENT: 0.8 % (ref 0–2)
BILIRUB SERPL-MCNC: 0.6 MG/DL (ref 0–1.2)
BILIRUBIN URINE: NEGATIVE
BLOOD, URINE: ABNORMAL
BUN BLDV-MCNC: 22 MG/DL (ref 6–23)
CALCIUM SERPL-MCNC: 9.6 MG/DL (ref 8.6–10.2)
CHLORIDE BLD-SCNC: 96 MMOL/L (ref 98–107)
CLARITY: CLEAR
CO2: 28 MMOL/L (ref 22–29)
COLOR: YELLOW
CREAT SERPL-MCNC: 2 MG/DL (ref 0.7–1.2)
EOSINOPHILS ABSOLUTE: 0.05 E9/L (ref 0.05–0.5)
EOSINOPHILS RELATIVE PERCENT: 0.5 % (ref 0–6)
GFR SERPL CREATININE-BSD FRML MDRD: 36 ML/MIN/1.73
GLUCOSE BLD-MCNC: 265 MG/DL (ref 74–99)
GLUCOSE URINE: 100 MG/DL
HCT VFR BLD CALC: 49.9 % (ref 37–54)
HEMOGLOBIN: 16.6 G/DL (ref 12.5–16.5)
IMMATURE GRANULOCYTES #: 0.04 E9/L
IMMATURE GRANULOCYTES %: 0.4 % (ref 0–5)
KETONES, URINE: NEGATIVE MG/DL
LACTIC ACID: 2.1 MMOL/L (ref 0.5–2.2)
LEUKOCYTE ESTERASE, URINE: NEGATIVE
LIPASE: 38 U/L (ref 13–60)
LYMPHOCYTES ABSOLUTE: 2.66 E9/L (ref 1.5–4)
LYMPHOCYTES RELATIVE PERCENT: 24.5 % (ref 20–42)
MCH RBC QN AUTO: 26.8 PG (ref 26–35)
MCHC RBC AUTO-ENTMCNC: 33.3 % (ref 32–34.5)
MCV RBC AUTO: 80.6 FL (ref 80–99.9)
MONOCYTES ABSOLUTE: 0.8 E9/L (ref 0.1–0.95)
MONOCYTES RELATIVE PERCENT: 7.4 % (ref 2–12)
NEUTROPHILS ABSOLUTE: 7.2 E9/L (ref 1.8–7.3)
NEUTROPHILS RELATIVE PERCENT: 66.4 % (ref 43–80)
NITRITE, URINE: NEGATIVE
PDW BLD-RTO: 13.3 FL (ref 11.5–15)
PH UA: 6 (ref 5–9)
PLATELET # BLD: 204 E9/L (ref 130–450)
PMV BLD AUTO: 12 FL (ref 7–12)
POTASSIUM SERPL-SCNC: 3.5 MMOL/L (ref 3.5–5)
PROTEIN UA: >=300 MG/DL
RBC # BLD: 6.19 E12/L (ref 3.8–5.8)
RBC UA: ABNORMAL /HPF (ref 0–2)
SODIUM BLD-SCNC: 137 MMOL/L (ref 132–146)
SPECIFIC GRAVITY UA: 1.02 (ref 1–1.03)
TOTAL PROTEIN: 7.5 G/DL (ref 6.4–8.3)
TROPONIN, HIGH SENSITIVITY: 129 NG/L (ref 0–11)
TROPONIN, HIGH SENSITIVITY: 137 NG/L (ref 0–11)
UROBILINOGEN, URINE: 0.2 E.U./DL
WBC # BLD: 10.8 E9/L (ref 4.5–11.5)
WBC UA: ABNORMAL /HPF (ref 0–5)

## 2023-01-16 PROCEDURE — 87088 URINE BACTERIA CULTURE: CPT

## 2023-01-16 PROCEDURE — 83690 ASSAY OF LIPASE: CPT

## 2023-01-16 PROCEDURE — 84484 ASSAY OF TROPONIN QUANT: CPT

## 2023-01-16 PROCEDURE — 2580000003 HC RX 258: Performed by: EMERGENCY MEDICINE

## 2023-01-16 PROCEDURE — 83605 ASSAY OF LACTIC ACID: CPT

## 2023-01-16 PROCEDURE — 6360000002 HC RX W HCPCS: Performed by: EMERGENCY MEDICINE

## 2023-01-16 PROCEDURE — 93005 ELECTROCARDIOGRAM TRACING: CPT | Performed by: PHYSICIAN ASSISTANT

## 2023-01-16 PROCEDURE — 85025 COMPLETE CBC W/AUTO DIFF WBC: CPT

## 2023-01-16 PROCEDURE — 96375 TX/PRO/DX INJ NEW DRUG ADDON: CPT

## 2023-01-16 PROCEDURE — 74176 CT ABD & PELVIS W/O CONTRAST: CPT

## 2023-01-16 PROCEDURE — 99284 EMERGENCY DEPT VISIT MOD MDM: CPT

## 2023-01-16 PROCEDURE — 80053 COMPREHEN METABOLIC PANEL: CPT

## 2023-01-16 PROCEDURE — 81001 URINALYSIS AUTO W/SCOPE: CPT

## 2023-01-16 PROCEDURE — 96374 THER/PROPH/DIAG INJ IV PUSH: CPT

## 2023-01-16 RX ORDER — OXYCODONE HCL 10 MG/1
10 TABLET, FILM COATED, EXTENDED RELEASE ORAL EVERY 12 HOURS
Qty: 60 EACH | Refills: 0 | Status: SHIPPED | OUTPATIENT
Start: 2023-01-16 | End: 2023-02-15

## 2023-01-16 RX ORDER — 0.9 % SODIUM CHLORIDE 0.9 %
1000 INTRAVENOUS SOLUTION INTRAVENOUS ONCE
Status: COMPLETED | OUTPATIENT
Start: 2023-01-16 | End: 2023-01-16

## 2023-01-16 RX ORDER — OXYCODONE HCL 10 MG/1
10 TABLET, FILM COATED, EXTENDED RELEASE ORAL EVERY 12 HOURS
COMMUNITY
End: 2023-01-16 | Stop reason: SDUPTHER

## 2023-01-16 RX ORDER — FENTANYL CITRATE 50 UG/ML
50 INJECTION, SOLUTION INTRAMUSCULAR; INTRAVENOUS ONCE
Status: COMPLETED | OUTPATIENT
Start: 2023-01-16 | End: 2023-01-16

## 2023-01-16 RX ORDER — KETOROLAC TROMETHAMINE 30 MG/ML
15 INJECTION, SOLUTION INTRAMUSCULAR; INTRAVENOUS ONCE
Status: COMPLETED | OUTPATIENT
Start: 2023-01-16 | End: 2023-01-16

## 2023-01-16 RX ORDER — SODIUM CHLORIDE 9 MG/ML
INJECTION, SOLUTION INTRAVENOUS CONTINUOUS
Status: DISCONTINUED | OUTPATIENT
Start: 2023-01-16 | End: 2023-01-17 | Stop reason: HOSPADM

## 2023-01-16 RX ADMIN — SODIUM CHLORIDE 1000 ML: 9 INJECTION, SOLUTION INTRAVENOUS at 18:24

## 2023-01-16 RX ADMIN — KETOROLAC TROMETHAMINE 15 MG: 30 INJECTION, SOLUTION INTRAMUSCULAR; INTRAVENOUS at 21:38

## 2023-01-16 RX ADMIN — FENTANYL CITRATE 50 MCG: 0.05 INJECTION, SOLUTION INTRAMUSCULAR; INTRAVENOUS at 18:23

## 2023-01-16 RX ADMIN — SODIUM CHLORIDE: 9 INJECTION, SOLUTION INTRAVENOUS at 22:42

## 2023-01-16 ASSESSMENT — PAIN SCALES - GENERAL: PAINLEVEL_OUTOF10: 8

## 2023-01-16 NOTE — ED NOTES
FIRST PROVIDER CONTACT ASSESSMENT NOTE       Department of Emergency Medicine                 First Provider Note            23  1:39 PM EST    Date of Encounter: No admission date for patient encounter. Patient Name: Jorge Thomas  : 1955  MRN: [id number]    Chief Complaint: No chief complaint on file. History of Present Illness:   Jorge Thomas is a 74 y.o. male who presents to the ED for left flank pain x 2 weeks. C/o nausea and vomiting as well. Focused Physical Exam:  VS:    ED Triage Vitals [23 1315]   BP Temp Temp Source Heart Rate Resp SpO2 Height Weight   -- 97.4 °F (36.3 °C) Temporal 98 -- 99 % -- --        Physical Ex: Constitutional: Alert and non-toxic.     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 ([address] 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 ([address] Cellulitis, scrotum, Coagulopathy ([address] Critical lower limb ischemia ([address] Diabetes mellitus ([address] 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 2 diabetes mellitus, with fat layer exposed ([address] Disruption of closure of muscle or muscle flap, sequela, DVT, lower extremity ([address] Encounter for dental examination and cleaning with abnormal findings, Gas gangrene of thigh ([address] History of DVT (deep vein thrombosis), Hyperglycemia due to type 2 diabetes mellitus ([address] Hyperlipidemia, Hypertension, Ischemic ulcer of right thigh with fat layer exposed ([address] Ischemic ulcer of right thigh with necrosis of muscle ([address] 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[address] Moderate protein-calorie malnutrition ([address] Occlusion of common femoral artery ([address] Osteomyelitis ([address] Osteomyelitis of right lower limb ([address] Postoperative wound infection, PVD (peripheral vascular disease) ([address] Vascular occlusion, and Wound infection.  Surgical History:  has a past surgical history that includes rhinoplasty (Right); pr i&d below fascia foot 1 bursal space (Right, 5/24/2018); femoral bypass; pr office/outpt visit,procedure only (Right, 8/3/2018); pr amputation thigh through femur re-amputation (Right, 11/1/2018); FEMORAL ENDARTERECTOMY (Right, 3/20/2020); Leg Surgery (Right, 4/17/2020); Leg Surgery (Right, 4/20/2020); Leg Surgery (Right, 4/21/2020); Leg Surgery (Right, 4/23/2020); AMPUTATION ABOVE KNEE (Right, 4/28/2020); AMPUTATION ABOVE KNEE (Right, 4/30/2020); Leg Surgery (Right, 10/15/2020); Leg Surgery (Right, 12/17/2020); Leg Surgery (Right, 5/14/2021); and Leg Surgery (Right, 5/18/2021).  Social History:  reports that he has been smoking cigarettes. He has a 3.50 pack-year smoking history. He has never used smokeless tobacco. He reports that he does not drink alcohol and does not use drugs.  Family History: family history includes Cancer in his mother and sister; Diabetes in his father; Heart Failure in his father; Hypertension in his father.    Allergies: Patient has no known allergies.     Initial Plan of Care: Initiate Treatment-Testing, Proceed toTreatment Area When Bed Available for ED Attending/MLP to Continue Care      ---END OF FIRST PROVIDER CONTACT ASSESSMENT NOTE---  Electronically signed by LORRAINE Lantigua   DD: 1/16/23       LORRAINE Lantigua  01/16/23 3845

## 2023-01-17 LAB
EKG ATRIAL RATE: 91 BPM
EKG P AXIS: 48 DEGREES
EKG P-R INTERVAL: 246 MS
EKG Q-T INTERVAL: 348 MS
EKG QRS DURATION: 96 MS
EKG QTC CALCULATION (BAZETT): 428 MS
EKG R AXIS: 32 DEGREES
EKG T AXIS: -87 DEGREES
EKG VENTRICULAR RATE: 91 BPM

## 2023-01-17 PROCEDURE — 93010 ELECTROCARDIOGRAM REPORT: CPT | Performed by: INTERNAL MEDICINE

## 2023-01-17 NOTE — ED PROVIDER NOTES
Department of Emergency Medicine   ED  Provider Note  Admit Date/RoomTime: 1/16/2023  5:37 PM  [facility]          History of Present Illness:  1/16/23, Time: 7:52 PM EST  Chief Complaint   Patient presents with    Emesis     Since the beginning of the month    Flank Pain     Left side, increase in pain today                 Loreto Peters is a 74 y.o. male presenting to the ED for vomiting. Patient's been having intermittent episodes of emesis since the beginning of the month. Came on gradually, nothing makes it better or worse, has had left-sided flank pain associated with it. He states he has lower left-sided flank pain. Denies any change in bowel or bladder. Denies any fevers or chills. Denies any chest pain or shortness of breath. Denies any back pain. Denies a cough or sputum. Denies any rash. Denies any other symptoms or complaints.     Review of Systems:   Pertinent positives and negatives are stated within HPI, all other systems reviewed and are negative.        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 ([address] 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 ([address] Cellulitis, scrotum, Coagulopathy ([address] Critical lower limb ischemia ([address] Diabetes mellitus ([address] 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 2 diabetes mellitus, with fat layer exposed ([address] Disruption of closure of muscle or muscle flap, sequela, DVT, lower extremity ([address] Encounter for dental examination and cleaning with abnormal findings, Gas gangrene of thigh ([facility] History of DVT (deep vein thrombosis), Hyperglycemia due to type 2 diabetes mellitus ([facility] Hyperlipidemia, Hypertension, Ischemic ulcer of right thigh with fat layer exposed ([address] Ischemic ulcer of right thigh with necrosis of muscle ([address] 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[address] Moderate protein-calorie malnutrition ([address] Occlusion of common femoral artery ([address] Osteomyelitis ([address] Osteomyelitis of right lower limb ([address] Postoperative wound infection, PVD (peripheral vascular disease) ([facility] Vascular occlusion, and Wound infection. Past Surgical History:  has a past surgical history that includes rhinoplasty (Right); pr i&d below fascia foot 1 bursal space (Right, 5/24/2018); femoral bypass; pr office/outpt visit,procedure only (Right, 8/3/2018); pr amputation thigh through femur re-amputation (Right, 11/1/2018); FEMORAL ENDARTERECTOMY (Right, 3/20/2020); Leg Surgery (Right, 4/17/2020); Leg Surgery (Right, 4/20/2020); Leg Surgery (Right, 4/21/2020); Leg Surgery (Right, 4/23/2020); AMPUTATION ABOVE KNEE (Right, 4/28/2020); AMPUTATION ABOVE KNEE (Right, 4/30/2020); Leg Surgery (Right, 10/15/2020); Leg Surgery (Right, 12/17/2020); Leg Surgery (Right, 5/14/2021); and Leg Surgery (Right, 5/18/2021). Social History:  reports that he has been smoking cigarettes. He has a 3.50 pack-year smoking history. He has never used smokeless tobacco. He reports that he does not drink alcohol and does not use drugs. Family History: family history includes Cancer in his mother and sister; Diabetes in his father; Heart Failure in his father; Hypertension in his father. . Unless otherwise noted, family history is non contributory    The patients home medications have been reviewed. Allergies: Patient has no known allergies.         ---------------------------------------------------PHYSICAL EXAM--------------------------------------    Constitutional/General: Alert and oriented x3  Head: Normocephalic and atraumatic  Eyes: PERRL, EOMI, sclera non icteric  Mouth: Oropharynx clear, handling secretions, no trismus, no asymmetry of the posterior oropharynx or uvular edema  Neck: Supple, full ROM, no stridor, no meningeal signs  Respiratory: Lungs clear to auscultation bilaterally, no wheezes, rales, or rhonchi. Not in respiratory distress  Cardiovascular:  Regular rate. Regular rhythm. 2+ distal pulses. Equal extremity pulses. Chest: No chest wall tenderness  GI:  Abdomen Soft, Non tender, Non distended. No rebound, guarding, or rigidity. No pulsatile masses. Musculoskeletal: Moves all extremities x 3. Right above-knee amputation noted  Back: No CVA tenderness, no bony tenderness or step-offs, lower paraspinal lumbar tenderness on the left  Integument: skin warm and dry. No rashes. Neurologic: GCS 15, no focal deficits, symmetric strength 5/5 in the upper and lower extremities bilaterally  Psychiatric: Normal Affect          -------------------------------------------------- RESULTS -------------------------------------------------  I have personally reviewed all laboratory and imaging results for this patient. Results are listed below.      LABS: (Lab results interpreted by me)  Results for orders placed or performed during the hospital encounter of 01/16/23   CBC with Auto Differential   Result Value Ref Range    WBC 10.8 4.5 - 11.5 E9/L    RBC 6.19 (H) 3.80 - 5.80 E12/L    Hemoglobin 16.6 (H) 12.5 - 16.5 g/dL    Hematocrit 49.9 37.0 - 54.0 %    MCV 80.6 80.0 - 99.9 fL    MCH 26.8 26.0 - 35.0 pg    MCHC 33.3 32.0 - 34.5 %    RDW 13.3 11.5 - 15.0 fL    Platelets 805 183 - 834 E9/L    MPV 12.0 7.0 - 12.0 fL    Neutrophils % 66.4 43.0 - 80.0 %    Immature Granulocytes % 0.4 0.0 - 5.0 %    Lymphocytes % 24.5 20.0 - 42.0 %    Monocytes % 7.4 2.0 - 12.0 %    Eosinophils % 0.5 0.0 - 6.0 %    Basophils % 0.8 0.0 - 2.0 %    Neutrophils Absolute 7.20 1.80 - 7.30 E9/L    Immature Granulocytes # 0.04 E9/L    Lymphocytes Absolute 2.66 1.50 - 4.00 E9/L    Monocytes Absolute 0.80 0.10 - 0.95 E9/L    Eosinophils Absolute 0.05 0.05 - 0.50 E9/L    Basophils Absolute 0. 09 0.00 - 0.20 E9/L   Comprehensive Metabolic Panel   Result Value Ref Range    Sodium 137 132 - 146 mmol/L    Potassium 3.5 3.5 - 5.0 mmol/L    Chloride 96 (L) 98 - 107 mmol/L    CO2 28 22 - 29 mmol/L    Anion Gap 13 7 - 16 mmol/L    Glucose 265 (H) 74 - 99 mg/dL    BUN 22 6 - 23 mg/dL    Creatinine 2.0 (H) 0.7 - 1.2 mg/dL    Est, Glom Filt Rate 36 >=60 mL/min/1.73    Calcium 9.6 8.6 - 10.2 mg/dL    Total Protein 7.5 6.4 - 8.3 g/dL    Albumin 3.5 3.5 - 5.2 g/dL    Total Bilirubin 0.6 0.0 - 1.2 mg/dL    Alkaline Phosphatase 114 40 - 129 U/L    ALT 17 0 - 40 U/L    AST 22 0 - 39 U/L   Lactic Acid   Result Value Ref Range    Lactic Acid 2.1 0.5 - 2.2 mmol/L   Lipase   Result Value Ref Range    Lipase 38 13 - 60 U/L   Troponin   Result Value Ref Range    Troponin, High Sensitivity 137 (H) 0 - 11 ng/L   Troponin   Result Value Ref Range    Troponin, High Sensitivity 129 (H) 0 - 11 ng/L   EKG 12 Lead   Result Value Ref Range    Ventricular Rate 91 BPM    Atrial Rate 91 BPM    P-R Interval 246 ms    QRS Duration 96 ms    Q-T Interval 348 ms    QTc Calculation (Bazett) 428 ms    P Axis 48 degrees    R Axis 32 degrees    T Axis -87 degrees   ,       RADIOLOGY:  Interpreted by Radiologist unless otherwise specified  CT ABDOMEN PELVIS WO CONTRAST Additional Contrast? None   Final Result   No acute inflammatory process in the abdomen and pelvis. Specifically, there   are no renal, ureteral or bladder stones. Diffuse thickening of the bladder wall, may be due to suboptimal distension   versus cystitis.                EKG Interpretation  Interpreted by emergency department physician, Dr. Anabella Christianson              ------------------------- NURSING NOTES AND VITALS REVIEWED ---------------------------   The nursing notes within the ED encounter and vital signs as below have been reviewed by myself  BP (!) 160/95   Pulse 96   Temp 97.4 °F (36.3 °C) (Temporal)   Resp 20   SpO2 97%     Oxygen Saturation Interpretation: Normal    The patients available past medical records and past encounters were reviewed. ------------------------------ ED COURSE/MEDICAL DECISION MAKING----------------------  Medications   0.9 % sodium chloride bolus (1,000 mLs IntraVENous New Bag 1/16/23 1824)   0.9 % sodium chloride infusion (has no administration in time range)   fentaNYL (SUBLIMAZE) injection 50 mcg (50 mcg IntraVENous Given 1/16/23 1823)                Medical Decision Making:      Patient presents with sided flank pain. Differential includes kidney stone, UTI, musculoskeletal pain, among other pathologies. Did require IV fentanyl along with IV fluids. Exam showed paraspinal lower lumbar tenderness, no other abnormalities. EKG interpreted by myself shows sinus rhythm, rate 91, no STEMI, no sniffing change when compared to previous study. Labs interpreted by myself shows a creatinine of 2.0, chart review shows this is consistent for the patient. Otherwise CMP is unremarkable. Troponin, high sensitive, was 137, repeat 129. Patient has no EKG changes, he has lower back pain, not consistent with a cardiac pathology. CT of the and pelvis obtained, patient has no signs of of kidney stone, urinalysis shows blood, no signs of acute infection otherwise. This will be sent for culture. On reevaluation, patient's resting comfortably. States he feels mostly improved. Did receive IV Toradol which did resolve his symptoms. After discussion with him, shared decision-making was done, he is comfortable going home. Patient be discharged. Will refer to urology for his hematuria. He is also to follow-up with his PCP. Chart reviewed, patient is provided narcotics on the outpatient consistently, therefore, he is instructed to use over-the-counter medications for further pain. He is to return if he spikes a fever, as worsening pain, among further signs and symptoms. He voices understanding. He was discharged. Counseling:    The emergency provider has spoken with the patient and discussed todays results, in addition to providing specific details for the plan of care and counseling regarding the diagnosis and prognosis. Questions are answered at this time and they are agreeable with the plan.       --------------------------------- IMPRESSION AND DISPOSITION ---------------------------------    IMPRESSION  1. Flank pain        DISPOSITION  Disposition: Discharge to home  Patient condition is stable        NOTE: This report was transcribed using voice recognition software.  Every effort was made to ensure accuracy; however, inadvertent computerized transcription errors may be present       Fritz Atkins MD  01/17/23 1100

## 2023-01-19 ENCOUNTER — HOSPITAL ENCOUNTER (OUTPATIENT)
Dept: PHYSICAL THERAPY | Age: 68
Setting detail: THERAPIES SERIES
Discharge: HOME | End: 2023-01-19
Payer: COMMERCIAL

## 2023-01-19 LAB — URINE CULTURE, ROUTINE: NORMAL

## 2023-01-19 PROCEDURE — 97161 PT EVAL LOW COMPLEX 20 MIN: CPT | Performed by: PHYSICAL THERAPIST

## 2023-01-19 ASSESSMENT — PAIN DESCRIPTION - PAIN TYPE: TYPE: NEUROPATHIC PAIN;CHRONIC PAIN

## 2023-01-19 ASSESSMENT — PAIN DESCRIPTION - DESCRIPTORS: DESCRIPTORS: ACHING;BURNING

## 2023-01-19 ASSESSMENT — PAIN DESCRIPTION - LOCATION: LOCATION: LEG

## 2023-01-19 ASSESSMENT — PAIN DESCRIPTION - ORIENTATION: ORIENTATION: LEFT

## 2023-01-19 NOTE — PROGRESS NOTES
[facility]                Phone: [phone]   Fax: [phone]    Physical Therapy Daily Treatment Note  Date:  2023    Patient Name:  Loreto Peters    :  1955  MRN: [id number]    Evaluating therapist:  TERRENCE Sandoval                  (19)  Restrictions/Precautions:    Diagnosis:  s/p R AKA         ()  Treatment Diagnosis:    Insurance/Certification information:   [facility]              cert dates:    to  23                ICD-10:  T69.553M  Referring Physician:   Yayo Obando Plan of care signed (Y/N):  Y  Visit# / total visits:  1/10  Pain level: 8/10 (phantom pain)  Time In:    Time Out:      Subjective:      Exercises:  Exercise/Equipment Resistance/Repetitions Other comments   StepOne   10 min          SAQ [id number]    SLR  [id number]            seated hip abd [id number]                      flex [id number]    NK flex/ext [id number]           sit/stand            standing tolerance                wt shifting:  M/L                        A/P     II bar:  tandem amb                 side/side amb               foam marching                                    Other Therapeutic Activities:      Home Exercise Program:  provided 19    Manual Treatments:      Modalities:      Timed Code Treatment Minutes: Total Treatment Minutes:      Treatment/Activity Tolerance:  [] Patient tolerated treatment well [] Patient limited by fatique  [] Patient limited by pain  [] Patient limited by other medical complications  [] Other:     Prognosis: [] Good [] Fair  [] Poor    Patient Requires Follow-up: [] Yes  [] No    Plan:   [] Continue per plan of care [] Alter current plan (see comments)  [] Plan of care initiated [] Hold pending MD visit [] Discharge  Plan for Next Session:      See Weekly Progress Note: []  Yes  []  No  Next due:        Electronically signed by:   Ulises Kwan PT

## 2023-01-19 NOTE — PROGRESS NOTES
Physical Therapy: Initial Evaluation    Patient: Loreto Peters (6 y.o. male)   Examination Date:   Plan of Care Certification Period: 2023 to        :  1955 ;    Confirmed: Yes MRN: [id number]  CSN: [id number]   Insurance: Payor: Cesar Cornelia / Plan: 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, initial encounter [S78.111A]  Chronic pain syndrome [G89.4]  Muscle weakness (generalized) [M62.81] s/p AKA  Treatment Diagnosis:       PERTINENT MEDICAL HISTORY           Medical History: Chart Reviewed: Yes   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[address] 2020    Acute kidney injury ([address] 2020    AKA stump complication ([address]     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 ([address] 2018    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 ([address] 2018    Cellulitis, scrotum 3/20/2020    Coagulopathy ([address] 2018    Critical lower limb ischemia ([address] 3/20/2020    Diabetes mellitus ([address]     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 2 diabetes mellitus, with fat layer exposed ([address] 2018    Disruption of closure of muscle or muscle flap, sequela 2020    DVT, lower extremity ([address]     right leg     Encounter for dental examination and cleaning with abnormal findings 2018    Gas gangrene of thigh ([address] 3/20/2020    History of DVT (deep vein thrombosis) 2018    Hyperglycemia due to type 2 diabetes mellitus ([facility] 3/20/2020    Hyperlipidemia     Hypertension     Ischemic ulcer of right thigh with fat layer exposed ([address]     Ischemic ulcer of right thigh with necrosis of muscle (HCC)     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[facility]     Moderate protein-calorie malnutrition ([address] 5/23/2018    Occlusion of common femoral artery ([address] 3/20/2020    Osteomyelitis ([facility] 10/24/2018    Osteomyelitis of right lower limb ([address] 10/24/2018    Postoperative wound infection     PVD (peripheral vascular disease) ([facility]     Vascular occlusion     Wound infection 4/16/2020     Surgical History:   Past Surgical History:   Procedure Laterality Date    AMPUTATION ABOVE KNEE Right 4/28/2020    DEBRIDEMENT OF AMPUTATION RIGHT ABOVE KNEE performed by Cameron Macedo MD at [address] [facility] Right 4/30/2020    DEBRIDEMENT OF AMPUTATION RIGHT ABOVE KNEE,  POSS. ABOVE KNEE REVISION, POSS. CLOSURE, POSS.WOUND VAC -- D[address]. [address] / [address] [facility] performed by Cameron Macedo MD at [facility] Right 3/20/2020    RIGHT LOWER EXTREMITY THROMBECTOMY, POSSIBLE ANGIOGRAM, POSSIBLE INTERVENTION, POSSIBLE BYPASS. performed by Cameron Macedo MD at [address] [place] [address] Right 4/17/2020    RIGHT LEG DEBRIDEMENT INCISION AND DRAINAGE performed by Estuardo Pablo MD at [address] [place] [address] Right 4/20/2020    RIGHT THIGH WOUND DRESSING CHANGE; DEBRIDEMENT, removal of muscle performed by Estuardo Pablo MD at [address] [place] [address] Right 4/21/2020    RIGHT THIGH WOUND DRESSING CHANGE POSSIBLE  DEBRIDEMENT - NEEDS BEN [address] / JANETTE TO SEE performed by Sunil Gama MD at [address] [place] [address] Right 4/23/2020    RIGHT THIGH WOUND DRESSING CHANGE and DEBRIDEMENT performed by Cameron Macedo MD at [address] [place] [address] Right 10/15/2020    DEBRIDEMENT RIGHT ABOVE KNEE AMPUTATION POSS.  REVISION POSS. WOUND VAC performed by Wendy Crawford Toño Bain MD at [address] [place] [address] Right 12/17/2020    DEBRIDEMENT  RIGHT ABOVE KNEE AMPUTATION WITH POSS. ADVANCED SKIN THERAPY performed by Tami Gottron, MD at [address] [place] [address] Right 5/14/2021    DEBRIDEMENT RIGHT ABOVE KNEE AMPUTATION performed by Cortez Lara MD at [address] [place] [address] Right 5/18/2021    DEBRIDEMENT ABOVE THE KNEE AMPUTATION , WITH WOUND VAC APPLICATION performed by Tami Gottron, MD at [facility] Right 11/1/2018    AMPUTATION ABOVE KNEE RIGHT LEG performed by Tami Gottron, MD at [facility]  I&D BELOW FASCIA FOOT 1 BURSAL SPACE Right 5/24/2018    RIGHT FOOT INCISION AND DRAINAGE WITH PARTIAL BONE RESECTION performed by Marisabel Byers DPM at [facility]  OFFICE/OUTPT VISIT,PROCEDURE ONLY Right 8/3/2018    INCISION AND DRAINAGE MULTIPLE AREAS RIGHT FOOT WITH DEBRIDEMENT SOFT TISSUE performed by Marisabel Byers DPM at [address] Right     leg        Medications:   Current Outpatient Medications:     oxyCODONE (OXYCONTIN) 10 MG extended release tablet, Take 1 tablet by mouth in the morning and 1 tablet in the evening. Do all this for 30 days. Max Daily Amount: 20 mg., Disp: 60 each, Rfl: 0    Insulin Syringe-Needle U-100 (ULTICARE INSULIN SYRINGE) 31G X 5/16\" 0.3 ML MISC, 1 each by Other route 5 times daily, Disp: 500 each, Rfl: 3    oxyCODONE-acetaminophen (PERCOCET) 7.5-325 MG per tablet, Take 1 tablet by mouth every 4 hours as needed for Pain for up to 30 days. , Disp: 120 tablet, Rfl: 0    chlorthalidone (HYGROTON) 25 MG tablet, TAKE 1 TABLET BY MOUTH ONE TIME A DAY, Disp: 90 tablet, Rfl: 1    DULoxetine (CYMBALTA) 60 MG extended release capsule, TAKE TWO CAPSULES BY MOUTH EVERY MORNING, Disp: 180 capsule, Rfl: 1    gabapentin (NEURONTIN) 800 MG tablet, take 1 tablet by mouth four times a day, Disp: 360 tablet, Rfl: 1    metoprolol tartrate (LOPRESSOR) 25 MG tablet, take 1 tablet by mouth twice a day, Disp: 180 tablet, Rfl: 1    doxycycline hyclate (VIBRAMYCIN) 100 MG capsule, Take 1 capsule by mouth 2 times daily, Disp: 180 capsule, Rfl: 0    hydrALAZINE (APRESOLINE) 25 MG tablet, take 1 tablet by mouth three times a day, Disp: 90 tablet, Rfl: 5    Lancets (ONETOUCH DELICA PLUS [id number]) MISC, use 1 LANCET to TEST BLOOD SUGAR four times a day, Disp: 120 each, Rfl: 11    insulin lispro, 1 Unit Dial, (HUMALOG KWIKPEN) 100 UNIT/ML SOPN, Check Blood Sugar 3-4 times daily and Cover as follows:  =No Insulin, 121-150=2 units,  151-200=5 units, 201-250=9 units, 251-300=12 units, 301-350=15 units, 351-400=18 units, Over 400 give 18 units and call Physician max 60u/d, Disp: 15 mL, Rfl: 5    DROPLET PEN NEEDLES 31G X 8 MM MISC, use 1 PEN NEEDLE to inject MEDICATION subcutaneously five times a day, Disp: , Rfl:     mirtazapine (REMERON) 7.5 MG tablet, TAKE ONE TABLET BY MOUTH ONCE NIGHTLY, Disp: 90 tablet, Rfl: 5    mineral oil-hydrophilic petrolatum (AQUAPHOR) ointment, Apply topically once a week, Disp: 396 g, Rfl: 5    ammonium lactate (LAC-HYDRIN) 12 % lotion, Apply topically as needed. , Disp: 500 g, Rfl: 5    cloNIDine (CATAPRES) 0.1 MG tablet, Take 1 tablet by mouth 3 times daily, Disp: 270 tablet, Rfl: 5    cilostazol (PLETAL) 50 MG tablet, TAKE 1 TABLET BY MOUTH 2 TIMES A DAY, Disp: 180 tablet, Rfl: 3    amLODIPine (NORVASC) 10 MG tablet, Take 1 tablet by mouth daily, Disp: 90 tablet, Rfl: 1    insulin glargine (LANTUS SOLOSTAR) 100 UNIT/ML injection pen, inject 10 unit in IN THE MORNING and 30 unit every evening, Disp: 15 mL, Rfl: 11    ONETOUCH ULTRA strip, take 1 tablet by mouth three times a day if needed, Disp: 300 strip, Rfl: 5    naloxone 4 MG/0.1ML LIQD nasal spray, 1 spray by Nasal route as needed for Opioid Reversal, Disp: 1 each, Rfl: 5    lactobacillus (CULTURELLE) CAPS capsule, Take 2 capsules by mouth daily, Disp: 60 capsule, Rfl: 1    insulin lispro (HUMALOG) 100 UNIT/ML injection vial, Inject 15 Units into the skin 3 times daily (before meals), Disp: 15 vial, Rfl: 3    Handicap Placard MISC, by Does not apply route Patient cannot walk 200 ft without stopping to rest.   Expiration 10/21/2024, Disp: 1 each, Rfl: 0  Allergies: Patient has no known allergies.      SUBJECTIVE EXAMINATION      ,           Subjective History: Onset Date: 11/18/22  Subjective: pt presents to therapy with c/o decreased stamina/weakness across B LE's with all functional activities; PMH for R AKA done 2018 with revisions in 2020/2021; tells PT he has prosthetic but is only able to use for ~ 1 hour due to discomfort; gets aroound his house with WC and usning WW; no c/o N/T or buckkling in residual limb or L LE; phantom pain remains in R residual; MEDS help with symptoms; sleep seems to be fine; RTD for follow-up 2/17/23  Additional Pertinent Hx (if applicable):            Learning/Language: Learning  Does the patient/guardian have any barriers to learning?: No barriers  What is the preferred language of the patient/guardian?: English     Pain Screening    Pain Screening  Pain Type: Neuropathic pain, Chronic pain  Pain Location: Leg  Pain Orientation: Left  Pain Descriptors: Aching, Burning    Functional Status       Current Level of Function:     LE  Transfers: MOD I sit/stand and supine/sit    ADL Assistance: Independent  Homemaking Assistance: Needs assistance  Homemaking Responsibilities: No  Ambulation Assistance: Independent  Transfer Assistance: Needs assistance    OBJECTIVE EXAMINATION     Regional Screen:   Knee Screen: AROM/strength grossly WNL for all ranges  Ankle Screen: AROM/strength grossly WNL for all ranges     Ambulation/Gait (if applicable):   Ambulation  Surface: Level tile  Device: Rolling Walker  Assistance: Independent  Quality of Gait: pt able to ambulate using swing to mechanics on L LE with stable LE    Balance Screen:   Balance  Comments: static/dynamic balance is GOOD/GOOD+            Endurance:  FAIR+/GOOD- for all prolonged activities    Neuro Screen:   Sensation      Sensation  Overall Sensation Status: WFL           ASSESSMENT     Impression:      Body Structures, Functions, Activity Limitations Requiring Skilled Therapeutic Intervention: Decreased functional mobility , Decreased endurance, Decreased balance    Statement of Medical Necessity: Physical Therapy is both indicated and medically necessary as outlined in the POC to increase the likelihood of meeting the functionally related goals stated below. Patient's Activity Tolerance: Patient tolerated evaluation without incident      Patient's rehabilitation potential/prognosis is considered to be: Fair, Good    Factors which may impact rehabilitation potential include:          GOALS   Patient Goal(s):    Goals Completed by 4-6 weeks Goal Status   Improve pt transfers sit/stand to I with WW/crutches     Improve gait to distances of 50-60' with/without prosthetic using appropriate asst device I     Improve endurance for all prolonged activiites to GOOD/GOOD+     Assure I with HEP for home management of condition                  TREATMENT PLAN     Pt. actively involved in establishing Plan of Care and Goals: Yes    Treatment may include any combination of the following: Strengthening, Balance training, Functional mobility training, Gait training, Endurance training, Home exercise program     Frequency / Duration:  Patient to be seen pt to be seen 2x/week/4-6 weeks for   weeks      Eval Complexity:    Decision Making: Low Complexity     Therapist Signature: Radha Maya, PT    Date: 4/32/9780     I certify that the above Therapy Services are being furnished while the patient is under my care. I agree with the treatment plan and certify that this therapy is necessary.       Physician's Signature:  ___________________________   Date:_______                                                                   Dulce Sethi DO Physician Comments: _______________________________________________    Please sign and return to [facility]. Please fax to the location listed below.  Jessica Manley for this referral!    [phone]  Dept: [phone]       POC NOTE

## 2023-01-19 NOTE — PROGRESS NOTES
[facility]                Phone: [phone]   Fax: [phone]    Physical Therapy Daily Treatment Note  Date:  2023    Patient Name:  Marly Matute    :  1955  MRN: [id number]    Evaluating therapist:  TERRENCE Pike                  (23)  Restrictions/Precautions:    Diagnosis:  s/p R AKA      ()  Treatment Diagnosis:    Insurance/Certification information:   [facility] Dual              cert dates:    to  23                ICD-10:  H55.689H  Referring Physician:   Arun Varela Plan of care signed (Y/N):  Y  Visit# / total visits:  1/10  Pain level: 8/10 (phantom pain)  Time In:    Time Out:     Subjective:      Exercises:  Exercise/Equipment Resistance/Repetitions Other comments   StepOne            SAQ     SLR             seated hip abd                       flex     NK flex/ext            sit/stand            standing tolerance                wt shifting:  M/L                        A/P     II bar:  tandem amb                 side/side amb               foam marching                                    Other Therapeutic Activities:      Home Exercise Program:      Manual Treatments:      Modalities:      Timed Code Treatment Minutes: Total Treatment Minutes:      Treatment/Activity Tolerance:  [] Patient tolerated treatment well [] Patient limited by fatique  [] Patient limited by pain  [] Patient limited by other medical complications  [] Other:     Prognosis: [] Good [] Fair  [] Poor    Patient Requires Follow-up: [] Yes  [] No    Plan:   [] Continue per plan of care [] Alter current plan (see comments)  [] Plan of care initiated [] Hold pending MD visit [] Discharge  Plan for Next Session:      See Weekly Progress Note: []  Yes  []  No  Next due:        Electronically signed by:   Jose White PT

## 2023-01-24 ENCOUNTER — HOSPITAL ENCOUNTER (OUTPATIENT)
Dept: PHYSICAL THERAPY | Age: 68
Setting detail: THERAPIES SERIES
Discharge: HOME | End: 2023-01-24
Payer: COMMERCIAL

## 2023-01-24 NOTE — PROGRESS NOTES
[address] [facility]                Phone: [phone]  Fax: [phone]    Physical Therapy  Cancellation/No-show Note  Patient Name:  Felicitas Raymundo  :  1955   Date:  2023    For today's appointment patient:  [x]  Cancelled  []  Rescheduled appointment  []  No-show     Reason given by patient:  []  Patient ill  []  Conflicting appointment  []  No transportation    []  Conflict with work  []  No reason given  [x]  Other:  pt called to let PT know his hip bothered him too much to participate    Comments:      Electronically signed by:   Avril Montoya PT

## 2023-01-26 ENCOUNTER — HOSPITAL ENCOUNTER (OUTPATIENT)
Dept: PHYSICAL THERAPY | Age: 68
Setting detail: THERAPIES SERIES
Discharge: HOME | End: 2023-01-26
Payer: COMMERCIAL

## 2023-01-26 NOTE — PROGRESS NOTES
[address] [facility]                Phone: [phone]  Fax: [phone]    Physical Therapy  Cancellation/No-show Note  Patient Name:  Felicitas Raymundo  :  1955   Date:  2023    For today's appointment patient:  []  Cancelled  []  Rescheduled appointment  [x]  No-show     Reason given by patient:  []  Patient ill  []  Conflicting appointment  []  No transportation    []  Conflict with work  [x]  No reason given  []  Other:     Comments:  pt cancelled first session and was a no-show for the second of the week. Electronically signed by:   Avril Montoya PT

## 2023-01-30 DIAGNOSIS — E11.69 CONTROLLED TYPE 2 DIABETES MELLITUS WITH OTHER SPECIFIED COMPLICATION, WITH LONG-TERM CURRENT USE OF INSULIN (HCC): ICD-10-CM

## 2023-01-30 DIAGNOSIS — Z79.4 CONTROLLED TYPE 2 DIABETES MELLITUS WITH OTHER SPECIFIED COMPLICATION, WITH LONG-TERM CURRENT USE OF INSULIN (HCC): ICD-10-CM

## 2023-01-30 DIAGNOSIS — G89.4 CHRONIC PAIN SYNDROME: ICD-10-CM

## 2023-01-30 DIAGNOSIS — T87.9 AKA STUMP COMPLICATION (HCC): ICD-10-CM

## 2023-01-30 DIAGNOSIS — I73.9 PVD (PERIPHERAL VASCULAR DISEASE) (HCC): ICD-10-CM

## 2023-01-30 RX ORDER — OXYCODONE AND ACETAMINOPHEN 7.5; 325 MG/1; MG/1
1 TABLET ORAL EVERY 4 HOURS PRN
Qty: 120 TABLET | Refills: 0 | Status: SHIPPED | OUTPATIENT
Start: 2023-01-30 | End: 2023-03-01

## 2023-01-31 ENCOUNTER — HOSPITAL ENCOUNTER (OUTPATIENT)
Dept: PHYSICAL THERAPY | Age: 68
Setting detail: THERAPIES SERIES
Discharge: HOME | End: 2023-01-31
Payer: COMMERCIAL

## 2023-01-31 NOTE — PROGRESS NOTES
[address] [facility]                Phone: [phone]  Fax: [phone]    Physical Therapy  Cancellation/No-show Note  Patient Name:  Randy Lovell  :  1955   Date:  2023    For today's appointment patient:  [x]  Cancelled  []  Rescheduled appointment  []  No-show     Reason given by patient:  [x]  Patient ill  []  Conflicting appointment  []  No transportation    []  Conflict with work  []  No reason given  []  Other:     Comments:        Electronically signed by:   Bruce Shukla, PT

## 2023-02-02 ENCOUNTER — HOSPITAL ENCOUNTER (OUTPATIENT)
Dept: PHYSICAL THERAPY | Age: 68
Setting detail: THERAPIES SERIES
Discharge: HOME | End: 2023-02-02

## 2023-02-02 NOTE — PROGRESS NOTES
[address] [facility]                Phone: [phone]  Fax: [phone]    Physical Therapy  Cancellation/No-show Note  Patient Name:  Norberto Delgado  :  1955   Date:  2023    For today's appointment patient:  []  Cancelled  []  Rescheduled appointment  [x]  No-show     Reason given by patient:  []  Patient ill  []  Conflicting appointment  []  No transportation    []  Conflict with work  [x]  No reason given  []  Other:     Comments:  pt cancelled first scheduled session and was a no-show for the second of the week. Electronically signed by:   Lulú Stephenson PT

## 2023-02-07 ENCOUNTER — HOSPITAL ENCOUNTER (OUTPATIENT)
Dept: PHYSICAL THERAPY | Age: 68
Setting detail: THERAPIES SERIES
Discharge: HOME | End: 2023-02-07
Payer: COMMERCIAL

## 2023-02-07 PROCEDURE — 97110 THERAPEUTIC EXERCISES: CPT | Performed by: PHYSICAL THERAPIST

## 2023-02-07 NOTE — PROGRESS NOTES
[address] [facility]                Phone: [phone]   Fax: [phone]    Physical Therapy Daily Treatment Note  Date:  2023    Patient Name:  Tamar Riddle    :  1955  MRN: [id number]    Evaluating therapist:  TERRENCE Cruz                  (19)  Restrictions/Precautions:    Diagnosis:  s/p R AKA         ()  Treatment Diagnosis:    Insurance/Certification information:   [facility] Dual              cert dates:    to  23                ICD-10:  O73.847E  Referring Physician:   Sirena Madrigal Plan of care signed (Y/N):  Y  Visit# / total visits:  2/10  Pain level: 8/10 (phantom pain)  Time In:  1453  Time Out:  1535    Subjective:      Exercises:  Exercise/Equipment Resistance/Repetitions Other comments   StepOne   10 min          SAQ [id number]    SLR  [id number]            seated hip add [id number]                      abd [id number]                      flex 4n10q5ef                knee ext 9b15d8fn                         flex 2j23mmqsn            sit/stand                wt shifting:  M/L                        A/P     II bar:  tandem amb                 side/side amb               foam marching                                    Other Therapeutic Activities:      Home Exercise Program:  provided 19    Manual Treatments:      Modalities:      Timed Code Treatment Minutes: Total Treatment Minutes:      Treatment/Activity Tolerance:  [] Patient tolerated treatment well [] Patient limited by fatique  [] Patient limited by pain  [] Patient limited by other medical complications  [] Other:     Prognosis: [] Good [] Fair  [] Poor    Patient Requires Follow-up: [] Yes  [] No    Plan:   [] Continue per plan of care [] Alter current plan (see comments)  [] Plan of care initiated [] Hold pending MD visit [] Discharge  Plan for Next Session:      See Weekly Progress Note: []  Yes  []  No  Next due:        Electronically signed by:   Gisella Mari PT

## 2023-02-09 ENCOUNTER — HOSPITAL ENCOUNTER (OUTPATIENT)
Dept: PHYSICAL THERAPY | Age: 68
Setting detail: THERAPIES SERIES
Discharge: HOME | End: 2023-02-09
Payer: COMMERCIAL

## 2023-02-09 NOTE — PROGRESS NOTES
S:  pt presents to therapy for one of two scheduled visits this week, having cancelled on 2/9/23 due to illness; at week's only visit he reported continuing weakness across B LE's; stated he continues to use WC primarily at home due to difficulty getting prosthetic on; no c/o buckling or LOB when he was able to stand/walk; HEP going well per pt    O:  performed the exercises/tasks as written in the flowsheet for the week ending 2/10/23; initiated HEP for home management of condition; L LE AROM WFL for all ranges; strength across L LE grossly 4+, 5/5 for all ranges     A:  dyan tx well; pt able to perform all requested tasks with good form and pacing; strength across L LE grossly stable;  transfers sit/stand and supine/sit are SBA/I; gait is slow/guarded using step-to pattern on one limb with [employer]; static/dynamic balance is GOOD-; endurnace for all prolonged activities is GOOD-    P:  cont with POC of strengthening/balance/endurance activities as pt tolerates

## 2023-02-10 DIAGNOSIS — G89.18 POSTOPERATIVE PAIN: ICD-10-CM

## 2023-02-10 DIAGNOSIS — T87.9 AKA STUMP COMPLICATION (HCC): ICD-10-CM

## 2023-02-10 DIAGNOSIS — G89.4 CHRONIC PAIN SYNDROME: ICD-10-CM

## 2023-02-10 RX ORDER — OXYCODONE HCL 10 MG/1
10 TABLET, FILM COATED, EXTENDED RELEASE ORAL EVERY 12 HOURS
Qty: 60 EACH | Refills: 0 | Status: SHIPPED | OUTPATIENT
Start: 2023-02-10 | End: 2023-03-12

## 2023-02-16 ENCOUNTER — HOSPITAL ENCOUNTER (OUTPATIENT)
Dept: PHYSICAL THERAPY | Age: 68
Setting detail: THERAPIES SERIES
Discharge: HOME | End: 2023-02-16
Payer: COMMERCIAL

## 2023-02-16 PROCEDURE — 97110 THERAPEUTIC EXERCISES: CPT | Performed by: PHYSICAL THERAPIST

## 2023-02-16 NOTE — PROGRESS NOTES
[address] [facility]                Phone: [phone]   Fax: [phone]    Physical Therapy Daily Treatment Note  Date:  2023    Patient Name:  Brandy Mccurdy    :  1955  MRN: [id number]    Evaluating therapist:  TERRENCE Monsalve                  (19)  Restrictions/Precautions:    Diagnosis:  s/p R AKA         ()  Treatment Diagnosis:    Insurance/Certification information:   [facility] Dual              cert dates:    to  23                ICD-10:  H79.976H  Referring Physician:   Waqar Sharp Plan of care signed (Y/N):  Y  Visit# / total visits:  3/10  Pain level: 8/10 (phantom pain)  Time In:  1456  Time Out:  1550    Subjective:      Exercises:  Exercise/Equipment Resistance/Repetitions Other comments   StepOne   10 min          SAQ [id number]    SLR  2q92d4tm     supine hip/knee ext [id number]                 seated hip add 0h02lqkpq                      abd 0w17agigl                      flex 8i72p4ps                knee ext 9d34d4gu                         flex 4d14ggddj            sit/stand                wt shifting:  M/L                        A/P     II bar:  tandem amb                 side/side amb               foam marching                                    Other Therapeutic Activities:      Home Exercise Program:  provided 19    Manual Treatments:      Modalities:      Timed Code Treatment Minutes:       Total Treatment Minutes:      Treatment/Activity Tolerance:  [] Patient tolerated treatment well [] Patient limited by fatique  [] Patient limited by pain  [] Patient limited by other medical complications  [] Other:     Prognosis: [] Good [] Fair  [] Poor    Patient Requires Follow-up: [] Yes  [] No    Plan:   [] Continue per plan of care [] Alter current plan (see comments)  [] Plan of care initiated [] Hold pending MD visit [] Discharge  Plan for Next Session:      See Weekly Progress Note: []  Yes  []  No  Next due:        Electronically signed by:   Dayday Radford, PT

## 2023-02-16 NOTE — PROGRESS NOTES
S:  pt presents to therapy for only scheduled visits this week, at this time he reports continued weakness across B LE's; stated he continues to use WC primarily at home due to difficulty getting prosthetic on; no c/o buckling or LOB when he was able to stand/walk; HEP going well per pt    O:  performed the exercises/tasks as written in the flowsheet for the week ending 2/17/23; initiated HEP for home management of condition; L LE AROM WFL for all ranges; strength across L LE grossly 4+, 5/5 for all ranges     A:  dyan tx well; pt able to perform all requested tasks with good form and pacing; strength across L LE grossly stable;  transfers sit/stand and supine/sit are SBA/I; gait is slow/guarded using step-to pattern on one limb with [employer]; static/dynamic balance is GOOD-; endurnace for all prolonged activities is GOOD-; advised pt to contact [employer] and have them instruct family members in putting prosthetic limb on so he can use it    P:  cont with POC of strengthening/balance/endurance activities as pt tolerates

## 2023-02-21 ENCOUNTER — HOSPITAL ENCOUNTER (OUTPATIENT)
Dept: PHYSICAL THERAPY | Age: 68
Setting detail: THERAPIES SERIES
Discharge: HOME | End: 2023-02-21
Payer: COMMERCIAL

## 2023-02-21 PROCEDURE — 97110 THERAPEUTIC EXERCISES: CPT | Performed by: PHYSICAL THERAPIST

## 2023-02-21 NOTE — PROGRESS NOTES
[address] [facility]                Phone: [phone]   Fax: [phone]    Physical Therapy Daily Treatment Note  Date:  2023    Patient Name:  Madan Yusuf    :  1955  MRN: [id number]    Evaluating therapist:  TERRENCE Howell                  (19)  Restrictions/Precautions:    Diagnosis:  s/p R AKA         ()  Treatment Diagnosis:    Insurance/Certification information:   [facility] Dual              cert dates:  45  to  23                ICD-10:  M98.064S  Referring Physician:   Uziel Yousif Plan of care signed (Y/N):  Y  Visit# / total visits:  4/10  Pain level: 8/10 (phantom pain)  Time In:  1538  Time Out:  1620    Subjective:      Exercises:  Exercise/Equipment Resistance/Repetitions Other comments   StepOne   10 min          SAQ [id number]    SLR  [id number]     supine hip/knee ext [id number]                 seated hip add [id number]                      abd [id number]                      flex [id number]                knee ext [id number]                         flex [id number]            sit/stand                wt shifting:  M/L                        A/P     II bar:  tandem amb                 side/side amb               foam marching                                    Other Therapeutic Activities:      Home Exercise Program:  provided 19    Manual Treatments:      Modalities:      Timed Code Treatment Minutes:       Total Treatment Minutes:      Treatment/Activity Tolerance:  [] Patient tolerated treatment well [] Patient limited by fatique  [] Patient limited by pain  [] Patient limited by other medical complications  [] Other:     Prognosis: [] Good [] Fair  [] Poor    Patient Requires Follow-up: [] Yes  [] No    Plan:   [] Continue per plan of care [] Alter current plan (see comments)  [] Plan of care initiated [] Hold pending MD visit [] Discharge  Plan for Next Session:      See Weekly Progress Note: []  Yes  []  No  Next due:        Electronically signed by:   Reena Burt, PT

## 2023-02-23 ENCOUNTER — HOSPITAL ENCOUNTER (OUTPATIENT)
Dept: PHYSICAL THERAPY | Age: 68
Setting detail: THERAPIES SERIES
Discharge: HOME | End: 2023-02-23
Payer: COMMERCIAL

## 2023-02-23 NOTE — PROGRESS NOTES
[address] [facility]                Phone: [phone]  Fax: [phone]    Physical Therapy  Cancellation/No-show Note  Patient Name:  Effie Yañez  :  1955   Date:  2023    For today's appointment patient:  [x]  Cancelled  []  Rescheduled appointment  []  No-show     Reason given by patient:  []  Patient ill  []  Conflicting appointment  [x]  No transportation    []  Conflict with work  []  No reason given  []  Other:     Comments:      Electronically signed by:   Mickey Stevens PT

## 2023-02-23 NOTE — PROGRESS NOTES
S:  pt presents to therapy for one of two scheduled visits this week, having to cancell on 2/23/23 due to lack of transportation, at weeks' only visit he  continued to report weakness across B LE's; stated he continues to use WC primarily at home due to difficulty getting prosthetic on; no c/o buckling or LOB when he was able to stand/walk; HEP going well per pt    O:  performed the exercises/tasks as written in the flowsheet for the week ending 2/24/23;  L LE AROM WFL for all ranges; strength across L LE grossly 4+, 5/5 for all ranges     A:  dyan tx well; pt able to perform all requested tasks with good form and pacing; strength across L LE grossly stable;  transfers sit/stand and supine/sit are SBA/I; gait is slow/guarded using step-to pattern on one limb with [address]; static/dynamic balance is GOOD-; endurnace for all prolonged activities is GOOD-; advised pt to contact [employer] and have them instruct family members in putting prosthetic limb on so he can use it    P:  cont with POC of strengthening/balance/endurance activities as pt tolerates; pt to see prosthetist 3/1/23 to go over proper fitting of prosthetic limb

## 2023-02-28 ENCOUNTER — HOSPITAL ENCOUNTER (OUTPATIENT)
Dept: PHYSICAL THERAPY | Age: 68
Setting detail: THERAPIES SERIES
Discharge: HOME | End: 2023-02-28
Payer: COMMERCIAL

## 2023-02-28 DIAGNOSIS — Z79.4 CONTROLLED TYPE 2 DIABETES MELLITUS WITH OTHER SPECIFIED COMPLICATION, WITH LONG-TERM CURRENT USE OF INSULIN (HCC): ICD-10-CM

## 2023-02-28 DIAGNOSIS — I10 HYPERTENSION, UNSPECIFIED TYPE: ICD-10-CM

## 2023-02-28 DIAGNOSIS — E11.69 CONTROLLED TYPE 2 DIABETES MELLITUS WITH OTHER SPECIFIED COMPLICATION, WITH LONG-TERM CURRENT USE OF INSULIN (HCC): ICD-10-CM

## 2023-02-28 DIAGNOSIS — G89.4 CHRONIC PAIN SYNDROME: ICD-10-CM

## 2023-02-28 DIAGNOSIS — T87.9 AKA STUMP COMPLICATION (HCC): ICD-10-CM

## 2023-02-28 DIAGNOSIS — I73.9 PVD (PERIPHERAL VASCULAR DISEASE) (HCC): ICD-10-CM

## 2023-02-28 RX ORDER — HYDRALAZINE HYDROCHLORIDE 25 MG/1
25 TABLET, FILM COATED ORAL 3 TIMES DAILY
Qty: 90 TABLET | Refills: 5 | Status: SHIPPED | OUTPATIENT
Start: 2023-02-28

## 2023-02-28 RX ORDER — OXYCODONE AND ACETAMINOPHEN 7.5; 325 MG/1; MG/1
1 TABLET ORAL EVERY 4 HOURS PRN
Qty: 120 TABLET | Refills: 0 | Status: SHIPPED | OUTPATIENT
Start: 2023-02-28 | End: 2023-03-30

## 2023-02-28 NOTE — PROGRESS NOTES
[address] [facility]                Phone: [phone]  Fax: [phone]    Physical Therapy  Cancellation/No-show Note  Patient Name:  Kimberly Shook  :  1955   Date:  2023    For today's appointment patient:  [x]  Cancelled  []  Rescheduled appointment  []  No-show     Reason given by patient:  []  Patient ill  []  Conflicting appointment  [x]  No transportation    []  Conflict with work  []  No reason given  []  Other:     Comments:      Electronically signed by:   Rosann Cabot, PT

## 2023-03-02 ENCOUNTER — HOSPITAL ENCOUNTER (OUTPATIENT)
Dept: PHYSICAL THERAPY | Age: 68
Setting detail: THERAPIES SERIES
Discharge: HOME | End: 2023-03-02

## 2023-03-02 NOTE — PROGRESS NOTES
[address] [facility]                Phone: [phone]  Fax: [phone]    Physical Therapy  Cancellation/No-show Note  Patient Name:  Asia Mccurdy  :  1955   Date:  3/2/2023    For today's appointment patient:  []  Cancelled  []  Rescheduled appointment  [x]  No-show     Reason given by patient:  []  Patient ill  []  Conflicting appointment  []  No transportation    []  Conflict with work  [x]  No reason given  []  Other:     Comments:  pt cancelled first visit and was a no-show for the second of the week. Electronically signed by:   Doris Victor PT

## 2023-03-03 NOTE — PROGRESS NOTES
PT has completed order for electric W/C and feels that as a adjunct pt would benefit from installation of a portable ramp to ease access to his home.

## 2023-03-06 ENCOUNTER — OFFICE VISIT (OUTPATIENT)
Dept: PRIMARY CARE CLINIC | Age: 68
End: 2023-03-06
Payer: COMMERCIAL

## 2023-03-06 VITALS — SYSTOLIC BLOOD PRESSURE: 136 MMHG | DIASTOLIC BLOOD PRESSURE: 68 MMHG | TEMPERATURE: 98.4 F

## 2023-03-06 DIAGNOSIS — M86.9 OSTEOMYELITIS OF RIGHT LOWER LIMB (HCC): ICD-10-CM

## 2023-03-06 DIAGNOSIS — N18.30 STAGE 3 CHRONIC KIDNEY DISEASE, UNSPECIFIED WHETHER STAGE 3A OR 3B CKD (HCC): ICD-10-CM

## 2023-03-06 DIAGNOSIS — E11.69 CONTROLLED TYPE 2 DIABETES MELLITUS WITH OTHER SPECIFIED COMPLICATION, WITH LONG-TERM CURRENT USE OF INSULIN (HCC): ICD-10-CM

## 2023-03-06 DIAGNOSIS — F33.1 MODERATE EPISODE OF RECURRENT MAJOR DEPRESSIVE DISORDER (HCC): ICD-10-CM

## 2023-03-06 DIAGNOSIS — G89.4 CHRONIC PAIN SYNDROME: ICD-10-CM

## 2023-03-06 DIAGNOSIS — S78.111A ABOVE KNEE AMPUTATION OF RIGHT LOWER EXTREMITY (HCC): ICD-10-CM

## 2023-03-06 DIAGNOSIS — I10 HYPERTENSION, UNSPECIFIED TYPE: ICD-10-CM

## 2023-03-06 DIAGNOSIS — R45.1 AGITATION: ICD-10-CM

## 2023-03-06 DIAGNOSIS — Z79.4 CONTROLLED TYPE 2 DIABETES MELLITUS WITH OTHER SPECIFIED COMPLICATION, WITH LONG-TERM CURRENT USE OF INSULIN (HCC): ICD-10-CM

## 2023-03-06 DIAGNOSIS — N25.81 SECONDARY HYPERPARATHYROIDISM OF RENAL ORIGIN (HCC): ICD-10-CM

## 2023-03-06 DIAGNOSIS — R25.1 TREMORS OF NERVOUS SYSTEM: Primary | ICD-10-CM

## 2023-03-06 DIAGNOSIS — I73.9 PVD (PERIPHERAL VASCULAR DISEASE) (HCC): ICD-10-CM

## 2023-03-06 PROBLEM — I12.9 BENIGN HYPERTENSIVE KIDNEY DISEASE WITH CHRONIC KIDNEY DISEASE: Status: ACTIVE | Noted: 2023-01-09

## 2023-03-06 PROBLEM — Z22.322 CARRIER OF METHICILLIN RESISTANT STAPHYLOCOCCUS AUREUS: Status: ACTIVE | Noted: 2023-02-14

## 2023-03-06 PROBLEM — Z89.611 S/P AKA (ABOVE KNEE AMPUTATION), RIGHT (HCC): Status: RESOLVED | Noted: 2018-11-05 | Resolved: 2023-03-06

## 2023-03-06 PROBLEM — E55.9 VITAMIN D DEFICIENCY: Status: ACTIVE | Noted: 2023-02-14

## 2023-03-06 LAB
ALBUMIN SERPL-MCNC: 3.7 G/DL (ref 3.5–5.2)
ALP BLD-CCNC: 101 U/L (ref 40–129)
ALT SERPL-CCNC: 14 U/L (ref 0–40)
ANION GAP SERPL CALCULATED.3IONS-SCNC: 9 MMOL/L (ref 7–16)
AST SERPL-CCNC: 23 U/L (ref 0–39)
BASOPHILS ABSOLUTE: 0.06 E9/L (ref 0–0.2)
BASOPHILS RELATIVE PERCENT: 0.6 % (ref 0–2)
BILIRUB SERPL-MCNC: 0.2 MG/DL (ref 0–1.2)
BUN BLDV-MCNC: 28 MG/DL (ref 6–23)
C-REACTIVE PROTEIN: 1.6 MG/DL (ref 0–0.4)
CALCIUM SERPL-MCNC: 9.3 MG/DL (ref 8.6–10.2)
CHLORIDE BLD-SCNC: 100 MMOL/L (ref 98–107)
CO2: 29 MMOL/L (ref 22–29)
CREAT SERPL-MCNC: 1.8 MG/DL (ref 0.7–1.2)
EOSINOPHILS ABSOLUTE: 0.16 E9/L (ref 0.05–0.5)
EOSINOPHILS RELATIVE PERCENT: 1.7 % (ref 0–6)
GFR SERPL CREATININE-BSD FRML MDRD: 41 ML/MIN/1.73
GLUCOSE BLD-MCNC: 81 MG/DL (ref 74–99)
HCT VFR BLD CALC: 44.6 % (ref 37–54)
HEMOGLOBIN: 14.2 G/DL (ref 12.5–16.5)
IMMATURE GRANULOCYTES #: 0.04 E9/L
IMMATURE GRANULOCYTES %: 0.4 % (ref 0–5)
LYMPHOCYTES ABSOLUTE: 2.81 E9/L (ref 1.5–4)
LYMPHOCYTES RELATIVE PERCENT: 29.5 % (ref 20–42)
MCH RBC QN AUTO: 26.5 PG (ref 26–35)
MCHC RBC AUTO-ENTMCNC: 31.8 % (ref 32–34.5)
MCV RBC AUTO: 83.4 FL (ref 80–99.9)
MONOCYTES ABSOLUTE: 0.62 E9/L (ref 0.1–0.95)
MONOCYTES RELATIVE PERCENT: 6.5 % (ref 2–12)
NEUTROPHILS ABSOLUTE: 5.85 E9/L (ref 1.8–7.3)
NEUTROPHILS RELATIVE PERCENT: 61.3 % (ref 43–80)
PDW BLD-RTO: 15 FL (ref 11.5–15)
PLATELET # BLD: 185 E9/L (ref 130–450)
PMV BLD AUTO: 12.1 FL (ref 7–12)
POTASSIUM SERPL-SCNC: 3.8 MMOL/L (ref 3.5–5)
RBC # BLD: 5.35 E12/L (ref 3.8–5.8)
SEDIMENTATION RATE, ERYTHROCYTE: 23 MM/HR (ref 0–15)
SODIUM BLD-SCNC: 138 MMOL/L (ref 132–146)
TOTAL PROTEIN: 7.4 G/DL (ref 6.4–8.3)
WBC # BLD: 9.5 E9/L (ref 4.5–11.5)

## 2023-03-06 PROCEDURE — 3017F COLORECTAL CA SCREEN DOC REV: CPT | Performed by: FAMILY MEDICINE

## 2023-03-06 PROCEDURE — 4004F PT TOBACCO SCREEN RCVD TLK: CPT | Performed by: FAMILY MEDICINE

## 2023-03-06 PROCEDURE — 2022F DILAT RTA XM EVC RTNOPTHY: CPT | Performed by: FAMILY MEDICINE

## 2023-03-06 PROCEDURE — 3078F DIAST BP <80 MM HG: CPT | Performed by: FAMILY MEDICINE

## 2023-03-06 PROCEDURE — G8417 CALC BMI ABV UP PARAM F/U: HCPCS | Performed by: FAMILY MEDICINE

## 2023-03-06 PROCEDURE — 1123F ACP DISCUSS/DSCN MKR DOCD: CPT | Performed by: FAMILY MEDICINE

## 2023-03-06 PROCEDURE — 3074F SYST BP LT 130 MM HG: CPT | Performed by: FAMILY MEDICINE

## 2023-03-06 PROCEDURE — G8482 FLU IMMUNIZE ORDER/ADMIN: HCPCS | Performed by: FAMILY MEDICINE

## 2023-03-06 PROCEDURE — 3046F HEMOGLOBIN A1C LEVEL >9.0%: CPT | Performed by: FAMILY MEDICINE

## 2023-03-06 PROCEDURE — G8427 DOCREV CUR MEDS BY ELIG CLIN: HCPCS | Performed by: FAMILY MEDICINE

## 2023-03-06 PROCEDURE — 99214 OFFICE O/P EST MOD 30 MIN: CPT | Performed by: FAMILY MEDICINE

## 2023-03-06 RX ORDER — AMMONIUM LACTATE 12 G/100G
LOTION TOPICAL
Qty: 500 G | Refills: 5 | Status: SHIPPED | OUTPATIENT
Start: 2023-03-06

## 2023-03-06 SDOH — ECONOMIC STABILITY: INCOME INSECURITY: HOW HARD IS IT FOR YOU TO PAY FOR THE VERY BASICS LIKE FOOD, HOUSING, MEDICAL CARE, AND HEATING?: PATIENT DECLINED

## 2023-03-06 SDOH — ECONOMIC STABILITY: FOOD INSECURITY: WITHIN THE PAST 12 MONTHS, YOU WORRIED THAT YOUR FOOD WOULD RUN OUT BEFORE YOU GOT MONEY TO BUY MORE.: PATIENT DECLINED

## 2023-03-06 SDOH — ECONOMIC STABILITY: FOOD INSECURITY: WITHIN THE PAST 12 MONTHS, THE FOOD YOU BOUGHT JUST DIDN'T LAST AND YOU DIDN'T HAVE MONEY TO GET MORE.: PATIENT DECLINED

## 2023-03-06 SDOH — ECONOMIC STABILITY: HOUSING INSECURITY
IN THE LAST 12 MONTHS, WAS THERE A TIME WHEN YOU DID NOT HAVE A STEADY PLACE TO SLEEP OR SLEPT IN A SHELTER (INCLUDING NOW)?: PATIENT REFUSED

## 2023-03-06 ASSESSMENT — PATIENT HEALTH QUESTIONNAIRE - PHQ9
SUM OF ALL RESPONSES TO PHQ QUESTIONS 1-9: 0
5. POOR APPETITE OR OVEREATING: 0
4. FEELING TIRED OR HAVING LITTLE ENERGY: 0
SUM OF ALL RESPONSES TO PHQ9 QUESTIONS 1 & 2: 0
8. MOVING OR SPEAKING SO SLOWLY THAT OTHER PEOPLE COULD HAVE NOTICED. OR THE OPPOSITE, BEING SO FIGETY OR RESTLESS THAT YOU HAVE BEEN MOVING AROUND A LOT MORE THAN USUAL: 0
10. IF YOU CHECKED OFF ANY PROBLEMS, HOW DIFFICULT HAVE THESE PROBLEMS MADE IT FOR YOU TO DO YOUR WORK, TAKE CARE OF THINGS AT HOME, OR GET ALONG WITH OTHER PEOPLE: 0
6. FEELING BAD ABOUT YOURSELF - OR THAT YOU ARE A FAILURE OR HAVE LET YOURSELF OR YOUR FAMILY DOWN: 0
7. TROUBLE CONCENTRATING ON THINGS, SUCH AS READING THE NEWSPAPER OR WATCHING TELEVISION: 0
1. LITTLE INTEREST OR PLEASURE IN DOING THINGS: 0
SUM OF ALL RESPONSES TO PHQ QUESTIONS 1-9: 0
SUM OF ALL RESPONSES TO PHQ QUESTIONS 1-9: 0
9. THOUGHTS THAT YOU WOULD BE BETTER OFF DEAD, OR OF HURTING YOURSELF: 0
2. FEELING DOWN, DEPRESSED OR HOPELESS: 0
3. TROUBLE FALLING OR STAYING ASLEEP: 0
SUM OF ALL RESPONSES TO PHQ QUESTIONS 1-9: 0

## 2023-03-06 NOTE — PROGRESS NOTES
Nadege Raymundo (:  1955) is a 74 y.o. male,Established patient, here for evaluation of the following chief complaint(s):  3 Month Follow-Up and Skin Problem (Black spots on back)         ASSESSMENT/PLAN:  1. Tremors of nervous system  -     [facility], NP, Neurology, [place]  2. Above knee amputation of right lower extremity (HCC)  -     mineral oil-hydrophilic petrolatum (AQUAPHOR) ointment; Apply topically once a week, Topical, WEEKLY Starting Mon 3/6/2023, Disp-396 g, R-5, Normal  3. Chronic pain syndrome  -     mineral oil-hydrophilic petrolatum (AQUAPHOR) ointment; Apply topically once a week, Topical, WEEKLY Starting Mon 3/6/2023, Disp-396 g, R-5, Normal  4. Agitation  -     [facility], NP, Neurology, [place]  5. Controlled type 2 diabetes mellitus with other specified complication, with long-term current use of insulin ([facility]  6. PVD (peripheral vascular disease) ([facility])  7. Stage 3 chronic kidney disease, unspecified whether stage 3a or 3b CKD ([facility] [address] [facility]    At this time we will send notes to Evangelista Perez for power wheelchair given the patient's current need and the raised benefit. Referral to neurology as well for evaluation of the worsening bilateral upper extremity tremors. Concern for possible worsening polyneuropathy versus Parkinson's given the worsening agitation as well. No follow-ups on file. Subjective   SUBJECTIVE/OBJECTIVE:  HPI  Patient presents today for 3-month follow-up on chronic issues and for medication refills. Patient states he is taking all medications as prescribed. As noted above he has been having more issues with bilateral upper extremity tremors. Continues to have worsening issues with fitting of the right lower extremity prosthesis. To this and I believe that he will benefit from a power wheelchair. Patient is currently following with nephrology and urology for further evaluation of stage III chronic kidney disease.   His niece also states that she has been noticing more agitation and worsening depressive issues. Current suicidal or homicidal ideation or plan when questioning the patient. Wheelchair mobility evaluation was performed today. He does have significant mobility limitations that prevent him from reaching the bathroom without difficulty to perform daily hygiene. The patient's chronic pain, right AKA, and tremors/polyneuropathy prevent him from ambulating safely with a cane or walker. The patient currently does not have the adequate upper and lower extremity strength to self propel with a manual wheelchair in the home due to severe weakness and worsening polyneuropathy and tremors. To this end I am currently prescribing a power wheelchair to address the mobility limitations. Review of Systems   Constitutional:  Positive for fatigue. HENT:  Negative for hearing loss and nosebleeds. Eyes:  Negative for photophobia. Respiratory:  Positive for apnea. Negative for shortness of breath. Cardiovascular:  Negative for palpitations and leg swelling. Gastrointestinal:  Positive for constipation. Negative for blood in stool and diarrhea. Endocrine: Negative for polydipsia. Genitourinary:  Negative for dysuria, frequency, hematuria and urgency. Musculoskeletal:  Positive for arthralgias, gait problem, joint swelling and myalgias. Negative for back pain. Right Sided above-the-knee amputation revised multiple times until it is  just distal to the hip joint   Skin: Negative. Neurological:  Positive for tremors, weakness and numbness. Negative for dizziness. Hematological:  Does not bruise/bleed easily. Psychiatric/Behavioral:  Positive for agitation, decreased concentration, dysphoric mood and sleep disturbance. Negative for hallucinations, self-injury and suicidal ideas. The patient is nervous/anxious. All other systems reviewed and are negative.        Current Outpatient Medications:     mineral oil-hydrophilic petrolatum (AQUAPHOR) ointment, Apply topically once a week, Disp: 396 g, Rfl: 5    ammonium lactate (LAC-HYDRIN) 12 % lotion, Apply topically as needed. , Disp: 500 g, Rfl: 5    hydrALAZINE (APRESOLINE) 25 MG tablet, Take 1 tablet by mouth 3 times daily, Disp: 90 tablet, Rfl: 5    oxyCODONE-acetaminophen (PERCOCET) 7.5-325 MG per tablet, Take 1 tablet by mouth every 4 hours as needed for Pain for up to 30 days. , Disp: 120 tablet, Rfl: 0    oxyCODONE (OXYCONTIN) 10 MG extended release tablet, Take 1 tablet by mouth in the morning and 1 tablet in the evening. Do all this for 30 days.  Max Daily Amount: 20 mg., Disp: 60 each, Rfl: 0    doxycycline hyclate (VIBRAMYCIN) 100 MG capsule, Take 1 capsule by mouth 2 times daily, Disp: 180 capsule, Rfl: 0    chlorthalidone (HYGROTON) 25 MG tablet, TAKE 1 TABLET BY MOUTH ONE TIME A DAY, Disp: 90 tablet, Rfl: 1    DULoxetine (CYMBALTA) 60 MG extended release capsule, TAKE TWO CAPSULES BY MOUTH EVERY MORNING, Disp: 180 capsule, Rfl: 1    gabapentin (NEURONTIN) 800 MG tablet, take 1 tablet by mouth four times a day, Disp: 360 tablet, Rfl: 1    metoprolol tartrate (LOPRESSOR) 25 MG tablet, take 1 tablet by mouth twice a day, Disp: 180 tablet, Rfl: 1    Lancets (ONETOUCH DELICA PLUS [id number]) MISC, use 1 LANCET to TEST BLOOD SUGAR four times a day, Disp: 120 each, Rfl: 11    Insulin Syringe-Needle U-100 (ULTICARE INSULIN SYRINGE) 31G X 5/16\" 0.3 ML MISC, 1 each by Other route 5 times daily, Disp: 500 each, Rfl: 3    insulin lispro, 1 Unit Dial, (HUMALOG KWIKPEN) 100 UNIT/ML SOPN, Check Blood Sugar 3-4 times daily and Cover as follows:  =No Insulin, 121-150=2 units,  151-200=5 units, 201-250=9 units, 251-300=12 units, 301-350=15 units, 351-400=18 units, Over 400 give 18 units and call Physician max 60u/d, Disp: 15 mL, Rfl: 5    DROPLET PEN NEEDLES 31G X 8 MM MISC, use 1 PEN NEEDLE to inject MEDICATION subcutaneously five times a day, Disp: , Rfl:     mirtazapine (REMERON) 7.5 MG tablet, TAKE ONE TABLET BY MOUTH ONCE NIGHTLY, Disp: 90 tablet, Rfl: 5    cloNIDine (CATAPRES) 0.1 MG tablet, Take 1 tablet by mouth 3 times daily, Disp: 270 tablet, Rfl: 5    cilostazol (PLETAL) 50 MG tablet, TAKE 1 TABLET BY MOUTH 2 TIMES A DAY, Disp: 180 tablet, Rfl: 3    amLODIPine (NORVASC) 10 MG tablet, Take 1 tablet by mouth daily, Disp: 90 tablet, Rfl: 1    insulin glargine (LANTUS SOLOSTAR) 100 UNIT/ML injection pen, inject 10 unit in IN THE MORNING and 30 unit every evening, Disp: 15 mL, Rfl: 11    ONETOUCH ULTRA strip, take 1 tablet by mouth three times a day if needed, Disp: 300 strip, Rfl: 5    naloxone 4 MG/0.1ML LIQD nasal spray, 1 spray by Nasal route as needed for Opioid Reversal, Disp: 1 each, Rfl: 5    Handicap Placard MISC, by Does not apply route Patient cannot walk 200 ft without stopping to rest.   Expiration 10/21/2024, Disp: 1 each, Rfl: 0    lactobacillus (CULTURELLE) CAPS capsule, Take 2 capsules by mouth daily, Disp: 60 capsule, Rfl: 1   Patient Active Problem List   Diagnosis    DM II (diabetes mellitus, type II), controlled ([address]    HTN (hypertension)    PVD (peripheral vascular disease) (HCC)    Leukocytosis    Stage 3 chronic kidney disease (HCC)    Tobacco dependence    HLD (hyperlipidemia)    History of vascular surgery    Above knee amputation of right lower extremity (HCC)    Postoperative pain    Chronic pain syndrome    Elevated sedimentation rate    Major depressive disorder, single episode, unspecified    Muscle weakness (generalized)    Obstructive sleep apnea (adult) (pediatric)    Benign hypertensive kidney disease with chronic kidney disease    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    Secondary hyperparathyroidism of renal origin ([address]    Vitamin D deficiency     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[address] 1/16/2020    Acute kidney injury ([address] 5/18/2020    AKA stump complication ([address] 7/17/7074 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 ([address] 5/22/2018    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 ([address] 5/21/2018    Cellulitis, scrotum 3/20/2020    Coagulopathy ([address] 5/21/2018    Critical lower limb ischemia ([address] 3/20/2020    Diabetes mellitus ([address]     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 2 diabetes mellitus, with fat layer exposed ([address] 5/22/2018    Disruption of closure of muscle or muscle flap, sequela 8/26/2020    DVT, lower extremity ([address]     right leg     Encounter for dental examination and cleaning with abnormal findings 4/2/2018    Gas gangrene of thigh ([address] 3/20/2020    History of DVT (deep vein thrombosis) 7/31/2018    Hyperglycemia due to type 2 diabetes mellitus ([address] 3/20/2020    Hyperlipidemia     Hypertension     Ischemic ulcer of right thigh with fat layer exposed ([address]     Ischemic ulcer of right thigh with necrosis of muscle (HCC)     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[address]     Moderate protein-calorie malnutrition ([address] 5/23/2018    Occlusion of common femoral artery ([address] 3/20/2020    Osteomyelitis ([address] 10/24/2018    Osteomyelitis of right lower limb ([address] 10/24/2018    Postoperative wound infection     PVD (peripheral vascular disease) ([address]     Vascular occlusion     Wound infection 4/16/2020     Past Surgical History:   Procedure Laterality Date    AMPUTATION ABOVE KNEE Right 4/28/2020    DEBRIDEMENT OF AMPUTATION RIGHT ABOVE KNEE performed by Klever Yates MD at [address] [facility] Right 4/30/2020    DEBRIDEMENT OF AMPUTATION RIGHT ABOVE KNEE,  POSS. ABOVE KNEE REVISION, POSS. CLOSURE, POSS.WOUND VAC -- DRS. [address] [facility] / [address]  performed by Klever Yates MD at [facility] Right 3/20/2020    RIGHT LOWER EXTREMITY THROMBECTOMY, POSSIBLE ANGIOGRAM, POSSIBLE INTERVENTION, POSSIBLE BYPASS.  performed by Sydnee Baca MD at [facility]    LEG SURGERY Right 4/17/2020    RIGHT LEG DEBRIDEMENT INCISION AND DRAINAGE performed by Ken Gaviria MD at [facility]    LEG SURGERY Right 4/20/2020    RIGHT THIGH WOUND DRESSING CHANGE; DEBRIDEMENT, removal of muscle performed by Ken Gaviria MD at [facility]    LEG SURGERY Right 4/21/2020    RIGHT THIGH WOUND DRESSING CHANGE POSSIBLE  DEBRIDEMENT - NEEDS DRS. CASH / JANETTE TO SEE performed by Wes Pretty MD at [facility]    LEG SURGERY Right 4/23/2020    RIGHT THIGH WOUND DRESSING CHANGE and DEBRIDEMENT performed by Sydnee Baca MD at [facility]    LEG SURGERY Right 10/15/2020    DEBRIDEMENT RIGHT ABOVE KNEE AMPUTATION POSS. REVISION POSS. WOUND VAC performed by Sydnee Baca MD at [facility]    LEG SURGERY Right 12/17/2020    DEBRIDEMENT  RIGHT ABOVE KNEE AMPUTATION WITH POSS. ADVANCED SKIN THERAPY performed by Sydnee Baca MD at [facility]    LEG SURGERY Right 5/14/2021    DEBRIDEMENT RIGHT ABOVE KNEE AMPUTATION performed by Ken Gaviria MD at [facility]    LEG SURGERY Right 5/18/2021    DEBRIDEMENT ABOVE THE KNEE AMPUTATION , WITH WOUND VAC APPLICATION performed by Sydnee Baca MD at [facility]    MI AMPUTATION THIGH THROUGH FEMUR RE-AMPUTATION Right 11/1/2018    AMPUTATION ABOVE KNEE RIGHT LEG performed by Sydnee Baca MD at [facility]    MI I&D BELOW FASCIA FOOT 1 BURSAL SPACE Right 5/24/2018    RIGHT FOOT INCISION AND DRAINAGE WITH PARTIAL BONE RESECTION performed by Satinder Moraes DPM at [facility] OR    MI OFFICE/OUTPT VISIT,PROCEDURE ONLY Right 8/3/2018    INCISION AND DRAINAGE MULTIPLE AREAS RIGHT FOOT WITH DEBRIDEMENT SOFT TISSUE performed by Satinder Moraes DPM at [facility]    RHINOPLASTY Right     leg      Social History     Socioeconomic History    Marital status: Single     Spouse name: Not on file    Number of children: Not on file    Years of education: Not on file    Highest education level: Not on file  Occupational History    Not on file   Tobacco Use    Smoking status: Every Day     Packs/day: 0.50     Years: 7.00     Pack years: 3.50     Types: Cigarettes    Smokeless tobacco: Never   Vaping Use    Vaping Use: Never used   Substance and Sexual Activity    Alcohol use: No    Drug use: No    Sexual activity: Not on file   Other Topics Concern    Not on file   Social History Narrative    Not on file     Social Determinants of Health     Financial Resource Strain: Unknown    Difficulty of Paying Living Expenses: Patient refused   Food Insecurity: Unknown    Worried About Running Out of Food in the Last Year: Patient refused    [address] in the Last Year: Patient refused   Transportation Needs: Unknown    Lack of Transportation (Medical): Not on file    Lack of Transportation (Non-Medical): Patient refused   Physical Activity: Inactive    Days of Exercise per Week: 0 days    Minutes of Exercise per Session: 0 min   Stress: Not on file   Social Connections: Not on file   Intimate Partner Violence: Not on file   Housing Stability: Unknown    Unable to Pay for Housing in the Last Year: Not on file    Number of Places Lived in the Last Year: Not on file    Unstable Housing in the Last Year: Patient refused     Family History   Problem Relation Age of Onset    Cancer Mother     Diabetes Father     Heart Failure Father     Hypertension Father     Cancer Sister       There are no preventive care reminders to display for this patient. There are no preventive care reminders to display for this patient.    Diabetes Management   Topic Date Due    Diabetic foot exam  10/24/2019      Health Maintenance Due   Topic    DTaP/Tdap/Td vaccine (1 - Tdap)    Shingles vaccine (1 of 2)      Health Maintenance   Topic Date Due    DTaP/Tdap/Td vaccine (1 - Tdap) Never done    Colorectal Cancer Screen  Never done    Shingles vaccine (1 of 2) Never done    Diabetic foot exam  10/24/2019    COVID-19 Vaccine (4 - Booster for [place] series) 02/22/2022    Diabetic Alb to Cr ratio (uACR) test  08/18/2023    Lipids  08/18/2023    A1C test (Diabetic or Prediabetic)  11/18/2023    Depression Monitoring  11/18/2023    Annual Wellness Visit (AWV)  11/19/2023    GFR test (Diabetes, CKD 3-4, OR last GFR 15-59)  01/16/2024    Diabetic retinal exam  05/17/2024    Pneumococcal 65+ years Vaccine (3 - PPSV23 if available, else PCV20) 01/01/2025    Flu vaccine  Completed    AAA screen  Completed    Hepatitis C screen  Completed    HIV screen  Completed    Hepatitis A vaccine  Aged Out    Hib vaccine  Aged Out    Meningococcal (ACWY) vaccine  Aged Out      There are no preventive care reminders to display for this patient. There are no preventive care reminders to display for this patient. /68   Temp 98.4 °F (36.9 °C)     Objective   Physical Exam  Vitals reviewed. Constitutional:       Appearance: He is obese. HENT:      Head: Normocephalic and atraumatic. Mouth/Throat:      Dentition: Abnormal dentition (edentulous upper). Eyes:      General: No scleral icterus. Conjunctiva/sclera: Conjunctivae normal.      Pupils: Pupils are equal, round, and reactive to light. Neck:      Thyroid: No thyromegaly. Cardiovascular:      Rate and Rhythm: Normal rate and regular rhythm. Heart sounds: Normal heart sounds. No murmur heard. Pulmonary:      Effort: Pulmonary effort is normal.      Breath sounds: Normal breath sounds. No rales. Abdominal:      General: Bowel sounds are decreased. There is no distension. Palpations: Abdomen is soft. Tenderness: There is no abdominal tenderness. Musculoskeletal:      Cervical back: Neck supple. Legs:    Lymphadenopathy:      Cervical: No cervical adenopathy. Skin:     General: Skin is warm and dry. Findings: No erythema or rash. Neurological:      Mental Status: He is alert and oriented to person, place, and time. Cranial Nerves: No cranial nerve deficit. Sensory: Sensory deficit present. Motor: Weakness and tremor present. Psychiatric:         Mood and Affect: Mood is depressed. Affect is flat. Speech: Speech is delayed. Behavior: Behavior is slowed. Thought Content: Thought content is not paranoid or delusional. Thought content does not include homicidal or suicidal ideation. Thought content does not include homicidal or suicidal plan. Judgment: Judgment normal.                An electronic signature was used to authenticate this note.     --Bradley Kline,

## 2023-03-07 ENCOUNTER — HOSPITAL ENCOUNTER (OUTPATIENT)
Dept: PHYSICAL THERAPY | Age: 68
Setting detail: THERAPIES SERIES
Discharge: HOME | End: 2023-03-07

## 2023-03-07 RX ORDER — AMLODIPINE BESYLATE 10 MG/1
TABLET ORAL
Qty: 90 TABLET | Refills: 1 | Status: SHIPPED | OUTPATIENT
Start: 2023-03-07

## 2023-03-07 ASSESSMENT — ENCOUNTER SYMPTOMS
CONSTIPATION: 1
APNEA: 1
SHORTNESS OF BREATH: 0
BLOOD IN STOOL: 0
DIARRHEA: 0
BACK PAIN: 0
PHOTOPHOBIA: 0

## 2023-03-07 NOTE — PROGRESS NOTES
[address] [facility]                Phone: [phone]  Fax: [phone]    Physical Therapy  Cancellation/No-show Note  Patient Name:  Valentine Alas  :  1955   Date:  3/7/2023    For today's appointment patient:  [x]  Cancelled  []  Rescheduled appointment  []  No-show     Reason given by patient:  [x]  Patient ill  []  Conflicting appointment  []  No transportation    []  Conflict with work  []  No reason given  []  Other:     Comments:         Electronically signed by:   Keaton Escalante PT

## 2023-03-14 ENCOUNTER — HOSPITAL ENCOUNTER (OUTPATIENT)
Dept: PHYSICAL THERAPY | Age: 68
Setting detail: THERAPIES SERIES
Discharge: HOME | End: 2023-03-14

## 2023-03-14 NOTE — PROGRESS NOTES
[address] [facility]                Phone: [phone]  Fax: [phone]    Physical Therapy  Cancellation/No-show Note  Patient Name:  Celestino Gómez  :  1955   Date:  3/14/2023    For today's appointment patient:  [x]  Cancelled  []  Rescheduled appointment  []  No-show     Reason given by patient:  []  Patient ill  []  Conflicting appointment  []  No transportation    []  Conflict with work  []  No reason given  [x]  Other:  pt feeling too stressed about other appointments to participate today. Comments:         Electronically signed by:   Bhavin Gamboa PT

## 2023-03-15 ENCOUNTER — HOSPITAL ENCOUNTER (OUTPATIENT)
Dept: NUCLEAR MEDICINE | Age: 68
Discharge: HOME | End: 2023-03-17
Payer: COMMERCIAL

## 2023-03-15 ENCOUNTER — HOSPITAL ENCOUNTER (OUTPATIENT)
Dept: ULTRASOUND IMAGING | Age: 68
Discharge: HOME | End: 2023-03-17
Payer: COMMERCIAL

## 2023-03-15 DIAGNOSIS — E11.22 TYPE 2 DIABETES MELLITUS WITH STAGE 3B CHRONIC KIDNEY DISEASE, UNSPECIFIED WHETHER LONG TERM INSULIN USE (HCC): ICD-10-CM

## 2023-03-15 DIAGNOSIS — G89.18 POSTOPERATIVE PAIN: ICD-10-CM

## 2023-03-15 DIAGNOSIS — N18.32 TYPE 2 DIABETES MELLITUS WITH STAGE 3B CHRONIC KIDNEY DISEASE, UNSPECIFIED WHETHER LONG TERM INSULIN USE (HCC): ICD-10-CM

## 2023-03-15 DIAGNOSIS — N18.32 STAGE 3B CHRONIC KIDNEY DISEASE (HCC): ICD-10-CM

## 2023-03-15 DIAGNOSIS — I10 PRIMARY HYPERTENSION: ICD-10-CM

## 2023-03-15 DIAGNOSIS — I12.9 BENIGN HYPERTENSIVE CKD, STAGE 1-4 OR UNSPECIFIED CHRONIC KIDNEY DISEASE: ICD-10-CM

## 2023-03-15 PROCEDURE — 78707 K FLOW/FUNCT IMAGE W/O DRUG: CPT

## 2023-03-15 PROCEDURE — 76770 US EXAM ABDO BACK WALL COMP: CPT

## 2023-03-15 PROCEDURE — 3430000000 HC RX DIAGNOSTIC RADIOPHARMACEUTICAL: Performed by: RADIOLOGY

## 2023-03-15 PROCEDURE — 93975 VASCULAR STUDY: CPT

## 2023-03-15 PROCEDURE — A9562 TC99M MERTIATIDE: HCPCS | Performed by: RADIOLOGY

## 2023-03-15 RX ORDER — OXYCODONE HCL 10 MG/1
10 TABLET, FILM COATED, EXTENDED RELEASE ORAL 2 TIMES DAILY
Qty: 60 TABLET | Refills: 0 | Status: SHIPPED | OUTPATIENT
Start: 2023-03-15 | End: 2023-04-14

## 2023-03-15 RX ADMIN — Medication 11 MILLICURIE: at 13:58

## 2023-03-16 ENCOUNTER — HOSPITAL ENCOUNTER (OUTPATIENT)
Dept: PHYSICAL THERAPY | Age: 68
Setting detail: THERAPIES SERIES
Discharge: HOME | End: 2023-03-16

## 2023-03-16 NOTE — PROGRESS NOTES
[address] [facility]                Phone: [phone]  Fax: [phone]    Physical Therapy  Cancellation/No-show Note  Patient Name:  Fabio Ledesma  :  1955   Date:  3/16/2023    For today's appointment patient:  []  Cancelled  []  Rescheduled appointment  [x]  No-show     Reason given by patient:  []  Patient ill  []  Conflicting appointment  []  No transportation    []  Conflict with work  [x]  No reason given  []  Other:  pt cancelled the first session of the week and was a no-show for the second         Comments:         Electronically signed by:   Joao Rios PT

## 2023-03-21 ENCOUNTER — HOSPITAL ENCOUNTER (OUTPATIENT)
Dept: PHYSICAL THERAPY | Age: 68
Setting detail: THERAPIES SERIES
Discharge: HOME | End: 2023-03-21

## 2023-03-21 NOTE — PROGRESS NOTES
[address] [facility]                Phone: [phone]  Fax: [phone]    Physical Therapy  Cancellation/No-show Note  Patient Name:  Abdoul Brewster  :  1955   Date:  3/21/2023    For today's appointment patient:  [x]  Cancelled  []  Rescheduled appointment  []  No-show     Reason given by patient:  []  Patient ill  []  Conflicting appointment  []  No transportation    []  Conflict with work  [x]  No reason given  []  Other:         Comments:         Electronically signed by:   Jerel Leyden, PT

## 2023-03-23 ENCOUNTER — HOSPITAL ENCOUNTER (OUTPATIENT)
Dept: PHYSICAL THERAPY | Age: 68
Setting detail: THERAPIES SERIES
Discharge: HOME | End: 2023-03-23

## 2023-03-23 NOTE — PROGRESS NOTES
[facility]                Phone: [phone]  Fax: [phone]    Physical Therapy  Cancellation/No-show Note  Patient Name:  Mik Capps  :  1955   Date:  3/23/2023    For today's appointment patient:  []  Cancelled  []  Rescheduled appointment  [x]  No-show     Reason given by patient:  []  Patient ill  []  Conflicting appointment  []  No transportation    []  Conflict with work  [x]  No reason given  []  Other:  pt cancelled first session and was a no-show for the second. Comments:         Electronically signed by:   Lynn Hsu PT

## 2023-03-30 ENCOUNTER — HOSPITAL ENCOUNTER (OUTPATIENT)
Dept: PHYSICAL THERAPY | Age: 68
Setting detail: THERAPIES SERIES
Discharge: HOME | End: 2023-03-30

## 2023-03-31 DIAGNOSIS — T87.9 AKA STUMP COMPLICATION (HCC): ICD-10-CM

## 2023-03-31 DIAGNOSIS — I73.9 PVD (PERIPHERAL VASCULAR DISEASE) (HCC): ICD-10-CM

## 2023-03-31 DIAGNOSIS — Z79.4 CONTROLLED TYPE 2 DIABETES MELLITUS WITH OTHER SPECIFIED COMPLICATION, WITH LONG-TERM CURRENT USE OF INSULIN (HCC): ICD-10-CM

## 2023-03-31 DIAGNOSIS — G89.4 CHRONIC PAIN SYNDROME: ICD-10-CM

## 2023-03-31 DIAGNOSIS — E11.69 CONTROLLED TYPE 2 DIABETES MELLITUS WITH OTHER SPECIFIED COMPLICATION, WITH LONG-TERM CURRENT USE OF INSULIN (HCC): ICD-10-CM

## 2023-03-31 RX ORDER — OXYCODONE AND ACETAMINOPHEN 7.5; 325 MG/1; MG/1
1 TABLET ORAL EVERY 4 HOURS PRN
Qty: 120 TABLET | Refills: 0 | Status: SHIPPED | OUTPATIENT
Start: 2023-03-31 | End: 2023-04-30

## 2023-04-04 DIAGNOSIS — T87.9 AKA STUMP COMPLICATION (HCC): ICD-10-CM

## 2023-04-04 DIAGNOSIS — I73.9 PVD (PERIPHERAL VASCULAR DISEASE) (HCC): ICD-10-CM

## 2023-04-04 DIAGNOSIS — G89.4 CHRONIC PAIN SYNDROME: ICD-10-CM

## 2023-04-04 DIAGNOSIS — E11.69 CONTROLLED TYPE 2 DIABETES MELLITUS WITH OTHER SPECIFIED COMPLICATION, WITH LONG-TERM CURRENT USE OF INSULIN (HCC): ICD-10-CM

## 2023-04-04 DIAGNOSIS — I10 HYPERTENSION, UNSPECIFIED TYPE: ICD-10-CM

## 2023-04-04 DIAGNOSIS — Z79.4 CONTROLLED TYPE 2 DIABETES MELLITUS WITH OTHER SPECIFIED COMPLICATION, WITH LONG-TERM CURRENT USE OF INSULIN (HCC): ICD-10-CM

## 2023-04-04 RX ORDER — PEN NEEDLE, DIABETIC 29 G X1/2"
NEEDLE, DISPOSABLE MISCELLANEOUS
COMMUNITY
Start: 2023-03-31 | End: 2023-04-04 | Stop reason: SDUPTHER

## 2023-04-04 RX ORDER — HYDRALAZINE HYDROCHLORIDE 25 MG/1
25 TABLET, FILM COATED ORAL 3 TIMES DAILY
Qty: 90 TABLET | Refills: 5 | Status: SHIPPED | OUTPATIENT
Start: 2023-04-04

## 2023-04-04 RX ORDER — OXYCODONE AND ACETAMINOPHEN 7.5; 325 MG/1; MG/1
1 TABLET ORAL EVERY 4 HOURS PRN
Qty: 120 TABLET | Refills: 0 | Status: SHIPPED | OUTPATIENT
Start: 2023-04-04 | End: 2023-05-04

## 2023-04-04 RX ORDER — PEN NEEDLE, DIABETIC 29 G X1/2"
NEEDLE, DISPOSABLE MISCELLANEOUS
Qty: 200 EACH | Refills: 5 | Status: SHIPPED | OUTPATIENT
Start: 2023-04-04
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
## 2023-04-17 DIAGNOSIS — G89.18 POSTOPERATIVE PAIN: ICD-10-CM

## 2023-04-17 RX ORDER — OXYCODONE HCL 10 MG/1
10 TABLET, FILM COATED, EXTENDED RELEASE ORAL 2 TIMES DAILY
Qty: 60 TABLET | Refills: 0 | Status: SHIPPED | OUTPATIENT
Start: 2023-04-17 | End: 2023-05-17

## 2023-04-25 DIAGNOSIS — I10 HYPERTENSION, UNSPECIFIED TYPE: ICD-10-CM

## 2023-04-25 DIAGNOSIS — E11.65 UNCONTROLLED TYPE 2 DIABETES MELLITUS WITH HYPERGLYCEMIA (HCC): ICD-10-CM

## 2023-04-25 RX ORDER — LANCETS 33 GAUGE
EACH MISCELLANEOUS
Qty: 400 EACH | Refills: 0 | Status: SHIPPED | OUTPATIENT
Start: 2023-04-25

## 2023-04-25 RX ORDER — INSULIN GLARGINE 100 [IU]/ML
INJECTION, SOLUTION SUBCUTANEOUS
Qty: 5 ADJUSTABLE DOSE PRE-FILLED PEN SYRINGE | Refills: 5 | Status: SHIPPED | OUTPATIENT
Start: 2023-04-25

## 2023-04-25 RX ORDER — PEN NEEDLE, DIABETIC 31 GX5/16"
NEEDLE, DISPOSABLE MISCELLANEOUS
Qty: 400 EACH | Refills: 0 | Status: SHIPPED | OUTPATIENT
Start: 2023-04-25

## 2023-04-25 RX ORDER — GLUCOSAMINE HCL/CHONDROITIN SU 500-400 MG
CAPSULE ORAL
Qty: 400 STRIP | Refills: 0 | Status: SHIPPED | OUTPATIENT
Start: 2023-04-25

## 2023-04-25 RX ORDER — CHLORTHALIDONE 25 MG/1
25 TABLET ORAL DAILY
Qty: 90 TABLET | Refills: 0 | Status: SHIPPED | OUTPATIENT
Start: 2023-04-25

## 2023-04-27 DIAGNOSIS — G89.4 CHRONIC PAIN SYNDROME: ICD-10-CM

## 2023-04-27 DIAGNOSIS — E11.65 UNCONTROLLED TYPE 2 DIABETES MELLITUS WITH HYPERGLYCEMIA (HCC): ICD-10-CM

## 2023-04-27 DIAGNOSIS — I73.9 PVD (PERIPHERAL VASCULAR DISEASE) (HCC): ICD-10-CM

## 2023-04-27 RX ORDER — AMMONIUM LACTATE 12 G/100G
LOTION TOPICAL
Qty: 500 G | Refills: 5 | Status: SHIPPED | OUTPATIENT
Start: 2023-04-27

## 2023-04-27 RX ORDER — DULOXETIN HYDROCHLORIDE 60 MG/1
CAPSULE, DELAYED RELEASE ORAL
Qty: 180 CAPSULE | Refills: 1 | Status: SHIPPED | OUTPATIENT
Start: 2023-04-27

## 2023-04-27 RX ORDER — INSULIN LISPRO 100 [IU]/ML
INJECTION, SOLUTION INTRAVENOUS; SUBCUTANEOUS
Qty: 15 ML | Refills: 5 | Status: SHIPPED | OUTPATIENT
Start: 2023-04-27

## 2023-04-27 RX ORDER — CLONIDINE HYDROCHLORIDE 0.1 MG/1
0.1 TABLET ORAL 3 TIMES DAILY
Qty: 270 TABLET | Refills: 5 | Status: SHIPPED | OUTPATIENT
Start: 2023-04-27

## 2023-04-27 RX ORDER — CILOSTAZOL 50 MG/1
TABLET ORAL
Qty: 180 TABLET | Refills: 3 | Status: SHIPPED | OUTPATIENT
Start: 2023-04-27

## 2023-05-01 DIAGNOSIS — I73.9 PVD (PERIPHERAL VASCULAR DISEASE) (HCC): ICD-10-CM

## 2023-05-01 DIAGNOSIS — G89.4 CHRONIC PAIN SYNDROME: ICD-10-CM

## 2023-05-01 DIAGNOSIS — T87.9 AKA STUMP COMPLICATION (HCC): ICD-10-CM

## 2023-05-01 DIAGNOSIS — E11.69 CONTROLLED TYPE 2 DIABETES MELLITUS WITH OTHER SPECIFIED COMPLICATION, WITH LONG-TERM CURRENT USE OF INSULIN (HCC): ICD-10-CM

## 2023-05-01 DIAGNOSIS — Z79.4 CONTROLLED TYPE 2 DIABETES MELLITUS WITH OTHER SPECIFIED COMPLICATION, WITH LONG-TERM CURRENT USE OF INSULIN (HCC): ICD-10-CM

## 2023-05-01 RX ORDER — OXYCODONE AND ACETAMINOPHEN 7.5; 325 MG/1; MG/1
1 TABLET ORAL EVERY 4 HOURS PRN
Qty: 120 TABLET | Refills: 0 | Status: SHIPPED | OUTPATIENT
Start: 2023-05-01 | End: 2023-05-31

## 2023-05-15 DIAGNOSIS — G89.18 POSTOPERATIVE PAIN: ICD-10-CM

## 2023-05-15 RX ORDER — OXYCODONE HCL 10 MG/1
10 TABLET, FILM COATED, EXTENDED RELEASE ORAL 2 TIMES DAILY
Qty: 60 TABLET | Refills: 0 | Status: SHIPPED
Start: 2023-05-15 | End: 2023-05-17 | Stop reason: SDUPTHER

## 2023-05-16 DIAGNOSIS — G89.18 POSTOPERATIVE PAIN: ICD-10-CM

## 2023-05-16 NOTE — TELEPHONE ENCOUNTER
Lmom for the pt to call the office back because [facility] called the office to let us know that the oxycodone is on back order, need to know where he wants it sent

## 2023-05-17 RX ORDER — OXYCODONE HCL 10 MG/1
10 TABLET, FILM COATED, EXTENDED RELEASE ORAL 2 TIMES DAILY
Qty: 60 TABLET | Refills: 0 | Status: SHIPPED | OUTPATIENT
Start: 2023-05-17 | End: 2023-06-16

## 2023-05-31 DIAGNOSIS — Z79.4 CONTROLLED TYPE 2 DIABETES MELLITUS WITH OTHER SPECIFIED COMPLICATION, WITH LONG-TERM CURRENT USE OF INSULIN (HCC): ICD-10-CM

## 2023-05-31 DIAGNOSIS — I73.9 PVD (PERIPHERAL VASCULAR DISEASE) (HCC): ICD-10-CM

## 2023-05-31 DIAGNOSIS — G89.4 CHRONIC PAIN SYNDROME: ICD-10-CM

## 2023-05-31 DIAGNOSIS — T87.9 AKA STUMP COMPLICATION (HCC): ICD-10-CM

## 2023-05-31 DIAGNOSIS — E11.69 CONTROLLED TYPE 2 DIABETES MELLITUS WITH OTHER SPECIFIED COMPLICATION, WITH LONG-TERM CURRENT USE OF INSULIN (HCC): ICD-10-CM

## 2023-05-31 RX ORDER — OXYCODONE AND ACETAMINOPHEN 7.5; 325 MG/1; MG/1
1 TABLET ORAL EVERY 4 HOURS PRN
Qty: 120 TABLET | Refills: 0 | Status: SHIPPED | OUTPATIENT
Start: 2023-05-31 | End: 2023-06-30

## 2023-06-05 ENCOUNTER — OFFICE VISIT (OUTPATIENT)
Dept: PRIMARY CARE CLINIC | Age: 68
End: 2023-06-05
Payer: COMMERCIAL

## 2023-06-05 VITALS
HEART RATE: 79 BPM | DIASTOLIC BLOOD PRESSURE: 90 MMHG | OXYGEN SATURATION: 98 % | TEMPERATURE: 97.4 F | SYSTOLIC BLOOD PRESSURE: 172 MMHG

## 2023-06-05 DIAGNOSIS — E11.69 CONTROLLED TYPE 2 DIABETES MELLITUS WITH OTHER SPECIFIED COMPLICATION, WITH LONG-TERM CURRENT USE OF INSULIN (HCC): ICD-10-CM

## 2023-06-05 DIAGNOSIS — N39.46 MIXED STRESS AND URGE URINARY INCONTINENCE: ICD-10-CM

## 2023-06-05 DIAGNOSIS — I73.9 PVD (PERIPHERAL VASCULAR DISEASE) (HCC): ICD-10-CM

## 2023-06-05 DIAGNOSIS — S81.802A OPEN WOUND OF LEFT LOWER LEG, INITIAL ENCOUNTER: Primary | ICD-10-CM

## 2023-06-05 DIAGNOSIS — Z79.4 CONTROLLED TYPE 2 DIABETES MELLITUS WITH OTHER SPECIFIED COMPLICATION, WITH LONG-TERM CURRENT USE OF INSULIN (HCC): ICD-10-CM

## 2023-06-05 DIAGNOSIS — G89.4 CHRONIC PAIN SYNDROME: ICD-10-CM

## 2023-06-05 DIAGNOSIS — T87.9 AKA STUMP COMPLICATION (HCC): ICD-10-CM

## 2023-06-05 DIAGNOSIS — G89.18 POSTOPERATIVE PAIN: ICD-10-CM

## 2023-06-05 PROCEDURE — G8417 CALC BMI ABV UP PARAM F/U: HCPCS | Performed by: FAMILY MEDICINE

## 2023-06-05 PROCEDURE — 3017F COLORECTAL CA SCREEN DOC REV: CPT | Performed by: FAMILY MEDICINE

## 2023-06-05 PROCEDURE — 3077F SYST BP >= 140 MM HG: CPT | Performed by: FAMILY MEDICINE

## 2023-06-05 PROCEDURE — 2022F DILAT RTA XM EVC RTNOPTHY: CPT | Performed by: FAMILY MEDICINE

## 2023-06-05 PROCEDURE — 99214 OFFICE O/P EST MOD 30 MIN: CPT | Performed by: FAMILY MEDICINE

## 2023-06-05 PROCEDURE — 4004F PT TOBACCO SCREEN RCVD TLK: CPT | Performed by: FAMILY MEDICINE

## 2023-06-05 PROCEDURE — 3080F DIAST BP >= 90 MM HG: CPT | Performed by: FAMILY MEDICINE

## 2023-06-05 PROCEDURE — 1123F ACP DISCUSS/DSCN MKR DOCD: CPT | Performed by: FAMILY MEDICINE

## 2023-06-05 PROCEDURE — 3046F HEMOGLOBIN A1C LEVEL >9.0%: CPT | Performed by: FAMILY MEDICINE

## 2023-06-05 PROCEDURE — G8427 DOCREV CUR MEDS BY ELIG CLIN: HCPCS | Performed by: FAMILY MEDICINE

## 2023-06-05 RX ORDER — ERGOCALCIFEROL 1.25 MG/1
CAPSULE ORAL
COMMUNITY
Start: 2023-04-28

## 2023-06-05 RX ORDER — HYDRALAZINE HYDROCHLORIDE 50 MG/1
TABLET, FILM COATED ORAL
COMMUNITY
Start: 2023-04-28

## 2023-06-05 RX ORDER — OXYCODONE HCL 10 MG/1
10 TABLET, FILM COATED, EXTENDED RELEASE ORAL 2 TIMES DAILY
Qty: 60 TABLET | Refills: 0 | Status: SHIPPED | OUTPATIENT
Start: 2023-06-05 | End: 2023-07-05

## 2023-06-05 RX ORDER — OXYCODONE AND ACETAMINOPHEN 7.5; 325 MG/1; MG/1
1 TABLET ORAL EVERY 4 HOURS PRN
Qty: 120 TABLET | Refills: 0 | Status: SHIPPED | OUTPATIENT
Start: 2023-06-05 | End: 2023-07-05

## 2023-06-05 RX ORDER — DOXYCYCLINE 100 MG/1
100 TABLET ORAL 2 TIMES DAILY
Qty: 20 TABLET | Refills: 0 | Status: SHIPPED | OUTPATIENT
Start: 2023-06-05 | End: 2023-06-15

## 2023-06-05 ASSESSMENT — ENCOUNTER SYMPTOMS
PHOTOPHOBIA: 0
DIARRHEA: 0
BLOOD IN STOOL: 0
APNEA: 1
SHORTNESS OF BREATH: 0
CONSTIPATION: 1
BACK PAIN: 0

## 2023-06-05 NOTE — PROGRESS NOTES
infection 4/16/2020     Past Surgical History:   Procedure Laterality Date    AMPUTATION ABOVE KNEE Right 4/28/2020    DEBRIDEMENT OF AMPUTATION RIGHT ABOVE KNEE performed by Maribel Howe MD at [address] [facility] Right 4/30/2020    DEBRIDEMENT OF AMPUTATION RIGHT ABOVE KNEE,  POSS. ABOVE KNEE REVISION, POSS. CLOSURE, POSS.WOUND VAC -- DRSClarisse [address] / [address] performed by Maribel Howe MD at [facility] Right 3/20/2020    RIGHT LOWER EXTREMITY THROMBECTOMY, POSSIBLE ANGIOGRAM, POSSIBLE INTERVENTION, POSSIBLE BYPASS. performed by Maribel Howe MD at [address] [place] [address] Right 4/17/2020    RIGHT LEG DEBRIDEMENT INCISION AND DRAINAGE performed by Angie Ramos MD at [address] [place] [address] Right 4/20/2020    RIGHT THIGH WOUND DRESSING CHANGE; DEBRIDEMENT, removal of muscle performed by Angie Ramos MD at [address] [place] [address] Right 4/21/2020    RIGHT THIGH WOUND DRESSING CHANGE POSSIBLE  DEBRIDEMENT - NEEDS DRSClarisse [address] / JANETTE TO SEE performed by Mikaela Johnson MD at [address] [place] [address] Right 4/23/2020    RIGHT THIGH WOUND DRESSING CHANGE and DEBRIDEMENT performed by Maribel Howe MD at [address] [place] [address] Right 10/15/2020    DEBRIDEMENT RIGHT ABOVE KNEE AMPUTATION POSS. REVISION POSS. WOUND VAC performed by Maribel Howe MD at [address] [place] [address] Right 12/17/2020    DEBRIDEMENT  RIGHT ABOVE KNEE AMPUTATION WITH POSS.  ADVANCED SKIN THERAPY performed by Maribel Howe MD at [address] [place] [address] Right 5/14/2021    DEBRIDEMENT RIGHT ABOVE KNEE AMPUTATION performed by Angie Ramos MD at [address] [place] [address] Right 5/18/2021    DEBRIDEMENT ABOVE THE KNEE AMPUTATION , WITH WOUND VAC APPLICATION performed by Maribel Howe MD at [facility] Right 11/1/2018    AMPUTATION ABOVE KNEE RIGHT LEG

## 2023-06-07 ENCOUNTER — OFFICE VISIT (OUTPATIENT)
Dept: NEUROLOGY | Age: 68
End: 2023-06-07
Payer: COMMERCIAL

## 2023-06-07 VITALS
OXYGEN SATURATION: 97 % | DIASTOLIC BLOOD PRESSURE: 88 MMHG | HEART RATE: 97 BPM | SYSTOLIC BLOOD PRESSURE: 138 MMHG | TEMPERATURE: 97.6 F

## 2023-06-07 DIAGNOSIS — Z79.4 CONTROLLED TYPE 2 DIABETES MELLITUS WITH OTHER SPECIFIED COMPLICATION, WITH LONG-TERM CURRENT USE OF INSULIN (HCC): ICD-10-CM

## 2023-06-07 DIAGNOSIS — E11.42 DIABETIC PERIPHERAL NEUROPATHY (HCC): ICD-10-CM

## 2023-06-07 DIAGNOSIS — F17.200 TOBACCO DEPENDENCE: ICD-10-CM

## 2023-06-07 DIAGNOSIS — I73.9 PVD (PERIPHERAL VASCULAR DISEASE) (HCC): ICD-10-CM

## 2023-06-07 DIAGNOSIS — Z89.611 S/P AKA (ABOVE KNEE AMPUTATION), RIGHT (HCC): ICD-10-CM

## 2023-06-07 DIAGNOSIS — R25.1 PHYSIOLOGICAL TREMOR: Primary | ICD-10-CM

## 2023-06-07 DIAGNOSIS — G89.4 CHRONIC PAIN SYNDROME: ICD-10-CM

## 2023-06-07 DIAGNOSIS — E11.69 CONTROLLED TYPE 2 DIABETES MELLITUS WITH OTHER SPECIFIED COMPLICATION, WITH LONG-TERM CURRENT USE OF INSULIN (HCC): ICD-10-CM

## 2023-06-07 PROCEDURE — 1123F ACP DISCUSS/DSCN MKR DOCD: CPT | Performed by: NURSE PRACTITIONER

## 2023-06-07 PROCEDURE — G8427 DOCREV CUR MEDS BY ELIG CLIN: HCPCS | Performed by: NURSE PRACTITIONER

## 2023-06-07 PROCEDURE — 2022F DILAT RTA XM EVC RTNOPTHY: CPT | Performed by: NURSE PRACTITIONER

## 2023-06-07 PROCEDURE — G8417 CALC BMI ABV UP PARAM F/U: HCPCS | Performed by: NURSE PRACTITIONER

## 2023-06-07 PROCEDURE — 4004F PT TOBACCO SCREEN RCVD TLK: CPT | Performed by: NURSE PRACTITIONER

## 2023-06-07 PROCEDURE — 99205 OFFICE O/P NEW HI 60 MIN: CPT | Performed by: NURSE PRACTITIONER

## 2023-06-07 PROCEDURE — 3046F HEMOGLOBIN A1C LEVEL >9.0%: CPT | Performed by: NURSE PRACTITIONER

## 2023-06-07 PROCEDURE — 3017F COLORECTAL CA SCREEN DOC REV: CPT | Performed by: NURSE PRACTITIONER

## 2023-06-07 PROCEDURE — 3079F DIAST BP 80-89 MM HG: CPT | Performed by: NURSE PRACTITIONER

## 2023-06-07 PROCEDURE — 3075F SYST BP GE 130 - 139MM HG: CPT | Performed by: NURSE PRACTITIONER

## 2023-06-07 RX ORDER — PRIMIDONE 50 MG/1
50 TABLET ORAL NIGHTLY
Qty: 30 TABLET | Refills: 0 | Status: SHIPPED | OUTPATIENT
Start: 2023-06-07 | End: 2023-07-07

## 2023-06-07 NOTE — PROGRESS NOTES
physiologic tremor   Is very mild today only seen in the left upper extremity with extension; no signs of Parkinson's on exam    This tremor is made worse with stress, anxiety; has not noticed coffee having any effect but will monitor. No family history of essential tremor or Parkinson's to his knowledge   Will start low-dose primidone and titrate up as needed    Diabetic peripheral neuropathy   Numbness and tingling reported to fingertips and lower extremity with reports of difficulty picking up things he drops   Last A1c in epic 8.7--down from 15; not currently under the care of endocrinology; being managed by PCP.     CKD   COLE on CKD per labs in March    PVD status post right AKA   Discolored and hardened skin to LLE    History of DVT   No longer requiring anticoagulation; none on med list    RAGHU   Will need to clarify next visit if patient uses CPAP machine    Chronic pain syndrome   Continue regimen per other providers and PCP    Vitamin D deficiency   Continue vitamin D supplementation    Open wound to LLE   Remains on doxycycline per PCP and podiatry    Tobacco dependence   Smoking cessation encouraged today    Plan:     Low-dose primidone 50 mg nightly  Patient will call with tolerability and effectiveness  Diabetes management is imperative to help with above conditions  Call with any issues  Return office in 3 months    SHONDA Pabon   3:38 PM  6/7/2023

## 2023-06-15 NOTE — TELEPHONE ENCOUNTER
ASSESSMENT-  Right groin/ inguinal pain--improved/ possible hip flexor strain    PLAN-  --discussed concerns and questions regarding hernias addressed.   REASSURANCE GIVEN AS THERE ARE NO SIGNS OF ABNORMAL LUMPS IN AREA.     --the previous pain to groin maybe strain as it is improving with rest. Can return back to activity when ready but advise stretching and good warm up.     EXPECT IMPROVEMENT OF AREA. IF PERSISTING PAINS OR DEVELOPS MORE CONCERNING LUMPS THEN MUST RECHECK WITH PCP FOR SECOND OPINION.    Thank you for your visit. Please call or reach out to Dr. Griffith or her staff if you have any remaining questions. [facility] is here for you or your family. We appreciate your business. Take good care!     If he is almost out of medication you can fax. If not we can wait till Thursday.

## 2023-06-16 ENCOUNTER — OFFICE VISIT (OUTPATIENT)
Dept: PODIATRY | Age: 68
End: 2023-06-16
Payer: COMMERCIAL

## 2023-06-16 VITALS — BODY MASS INDEX: 31.44 KG/M2 | WEIGHT: 245 LBS | HEIGHT: 74 IN

## 2023-06-16 DIAGNOSIS — S91.302D OPEN WOUND OF LEFT FOOT, SUBSEQUENT ENCOUNTER: ICD-10-CM

## 2023-06-16 DIAGNOSIS — E11.69 CONTROLLED TYPE 2 DIABETES MELLITUS WITH OTHER SPECIFIED COMPLICATION, WITH LONG-TERM CURRENT USE OF INSULIN (HCC): ICD-10-CM

## 2023-06-16 DIAGNOSIS — Z79.4 CONTROLLED TYPE 2 DIABETES MELLITUS WITH OTHER SPECIFIED COMPLICATION, WITH LONG-TERM CURRENT USE OF INSULIN (HCC): ICD-10-CM

## 2023-06-16 DIAGNOSIS — E11.69 CONTROLLED TYPE 2 DIABETES MELLITUS WITH OTHER SPECIFIED COMPLICATION, WITH LONG-TERM CURRENT USE OF INSULIN (HCC): Primary | ICD-10-CM

## 2023-06-16 DIAGNOSIS — G60.8 HEREDITARY SENSORY NEUROPATHY: ICD-10-CM

## 2023-06-16 DIAGNOSIS — Z79.4 CONTROLLED TYPE 2 DIABETES MELLITUS WITH OTHER SPECIFIED COMPLICATION, WITH LONG-TERM CURRENT USE OF INSULIN (HCC): Primary | ICD-10-CM

## 2023-06-16 PROCEDURE — 99204 OFFICE O/P NEW MOD 45 MIN: CPT | Performed by: PODIATRIST

## 2023-06-16 PROCEDURE — G8417 CALC BMI ABV UP PARAM F/U: HCPCS | Performed by: PODIATRIST

## 2023-06-16 PROCEDURE — 4004F PT TOBACCO SCREEN RCVD TLK: CPT | Performed by: PODIATRIST

## 2023-06-16 PROCEDURE — 3046F HEMOGLOBIN A1C LEVEL >9.0%: CPT | Performed by: PODIATRIST

## 2023-06-16 PROCEDURE — G8427 DOCREV CUR MEDS BY ELIG CLIN: HCPCS | Performed by: PODIATRIST

## 2023-06-16 PROCEDURE — 1123F ACP DISCUSS/DSCN MKR DOCD: CPT | Performed by: PODIATRIST

## 2023-06-16 PROCEDURE — 3017F COLORECTAL CA SCREEN DOC REV: CPT | Performed by: PODIATRIST

## 2023-06-16 PROCEDURE — 2022F DILAT RTA XM EVC RTNOPTHY: CPT | Performed by: PODIATRIST

## 2023-06-16 RX ORDER — AMMONIUM LACTATE 12 G/100G
LOTION TOPICAL
Qty: 400 G | Refills: 2 | Status: SHIPPED | OUTPATIENT
Start: 2023-06-16

## 2023-06-19 DIAGNOSIS — I10 HYPERTENSION, UNSPECIFIED TYPE: ICD-10-CM

## 2023-06-19 RX ORDER — CHLORTHALIDONE 25 MG/1
25 TABLET ORAL DAILY
Qty: 30 TABLET | Refills: 10 | Status: SHIPPED | OUTPATIENT
Start: 2023-06-19

## 2023-06-19 RX ORDER — ISOPROPYL ALCOHOL 70 ML/100ML
SWAB TOPICAL
Qty: 200 EACH | Refills: 10 | Status: SHIPPED | OUTPATIENT
Start: 2023-06-19

## 2023-06-19 RX ORDER — AMLODIPINE BESYLATE 10 MG/1
TABLET ORAL
Qty: 30 TABLET | Refills: 10 | Status: SHIPPED | OUTPATIENT
Start: 2023-06-19

## 2023-06-20 LAB
BACTERIA WND AEROBE CULT: ABNORMAL
ORGANISM: ABNORMAL

## 2023-06-20 RX ORDER — PRIMIDONE 50 MG/1
50 TABLET ORAL NIGHTLY
Qty: 90 TABLET | Refills: 3 | OUTPATIENT
Start: 2023-06-20 | End: 2023-09-18

## 2023-06-21 ENCOUNTER — TELEPHONE (OUTPATIENT)
Dept: PODIATRY | Age: 68
End: 2023-06-21

## 2023-06-21 RX ORDER — SULFAMETHOXAZOLE AND TRIMETHOPRIM 400; 80 MG/1; MG/1
1 TABLET ORAL DAILY
Qty: 10 TABLET | Refills: 0 | Status: SHIPPED | OUTPATIENT
Start: 2023-06-21 | End: 2023-07-01

## 2023-06-22 RX ORDER — PRIMIDONE 50 MG/1
50 TABLET ORAL NIGHTLY
Qty: 30 TABLET | Refills: 0 | Status: SHIPPED | OUTPATIENT
Start: 2023-06-22 | End: 2023-07-22

## 2023-06-26 ENCOUNTER — OFFICE VISIT (OUTPATIENT)
Dept: PODIATRY | Age: 68
End: 2023-06-26
Payer: COMMERCIAL

## 2023-06-26 DIAGNOSIS — G60.8 HEREDITARY SENSORY NEUROPATHY: ICD-10-CM

## 2023-06-26 DIAGNOSIS — S91.302D OPEN WOUND OF LEFT FOOT, SUBSEQUENT ENCOUNTER: ICD-10-CM

## 2023-06-26 DIAGNOSIS — E11.621 TYPE 2 DIABETES MELLITUS WITH FOOT ULCER, UNSPECIFIED WHETHER LONG TERM INSULIN USE (HCC): Primary | ICD-10-CM

## 2023-06-26 DIAGNOSIS — L97.509 TYPE 2 DIABETES MELLITUS WITH FOOT ULCER, UNSPECIFIED WHETHER LONG TERM INSULIN USE (HCC): Primary | ICD-10-CM

## 2023-06-26 PROCEDURE — 99213 OFFICE O/P EST LOW 20 MIN: CPT | Performed by: PODIATRIST

## 2023-06-26 PROCEDURE — 4004F PT TOBACCO SCREEN RCVD TLK: CPT | Performed by: PODIATRIST

## 2023-06-26 PROCEDURE — 1123F ACP DISCUSS/DSCN MKR DOCD: CPT | Performed by: PODIATRIST

## 2023-06-26 PROCEDURE — 2022F DILAT RTA XM EVC RTNOPTHY: CPT | Performed by: PODIATRIST

## 2023-06-26 PROCEDURE — G8427 DOCREV CUR MEDS BY ELIG CLIN: HCPCS | Performed by: PODIATRIST

## 2023-06-26 PROCEDURE — G8417 CALC BMI ABV UP PARAM F/U: HCPCS | Performed by: PODIATRIST

## 2023-06-26 PROCEDURE — 3046F HEMOGLOBIN A1C LEVEL >9.0%: CPT | Performed by: PODIATRIST

## 2023-06-26 PROCEDURE — 3017F COLORECTAL CA SCREEN DOC REV: CPT | Performed by: PODIATRIST

## 2023-06-26 RX ORDER — LANCETS 33 GAUGE
EACH MISCELLANEOUS
Qty: 200 EACH | Refills: 10 | Status: SHIPPED | OUTPATIENT
Start: 2023-06-26

## 2023-06-30 DIAGNOSIS — E11.69 CONTROLLED TYPE 2 DIABETES MELLITUS WITH OTHER SPECIFIED COMPLICATION, WITH LONG-TERM CURRENT USE OF INSULIN (HCC): ICD-10-CM

## 2023-06-30 DIAGNOSIS — T87.9 AKA STUMP COMPLICATION (HCC): ICD-10-CM

## 2023-06-30 DIAGNOSIS — G89.4 CHRONIC PAIN SYNDROME: ICD-10-CM

## 2023-06-30 DIAGNOSIS — G89.18 POSTOPERATIVE PAIN: ICD-10-CM

## 2023-06-30 DIAGNOSIS — Z79.4 CONTROLLED TYPE 2 DIABETES MELLITUS WITH OTHER SPECIFIED COMPLICATION, WITH LONG-TERM CURRENT USE OF INSULIN (HCC): ICD-10-CM

## 2023-06-30 DIAGNOSIS — I73.9 PVD (PERIPHERAL VASCULAR DISEASE) (HCC): ICD-10-CM

## 2023-06-30 RX ORDER — OXYCODONE AND ACETAMINOPHEN 7.5; 325 MG/1; MG/1
1 TABLET ORAL EVERY 4 HOURS PRN
Qty: 120 TABLET | Refills: 0 | Status: SHIPPED | OUTPATIENT
Start: 2023-06-30 | End: 2023-07-30

## 2023-06-30 RX ORDER — OXYCODONE HCL 10 MG/1
10 TABLET, FILM COATED, EXTENDED RELEASE ORAL 2 TIMES DAILY
Qty: 60 TABLET | Refills: 0 | Status: SHIPPED | OUTPATIENT
Start: 2023-06-30 | End: 2023-07-30

## 2023-07-03 RX ORDER — LANCETS 33 GAUGE
EACH MISCELLANEOUS
Qty: 200 EACH | Refills: 10 | Status: SHIPPED | OUTPATIENT
Start: 2023-07-03

## 2023-07-03 RX ORDER — OXYCODONE AND ACETAMINOPHEN 7.5; 325 MG/1; MG/1
1 TABLET ORAL EVERY 4 HOURS PRN
Qty: 120 TABLET | Refills: 0 | Status: SHIPPED | OUTPATIENT
Start: 2023-07-03 | End: 2023-08-02

## 2023-07-03 RX ORDER — OXYCODONE HCL 10 MG/1
10 TABLET, FILM COATED, EXTENDED RELEASE ORAL 2 TIMES DAILY
Qty: 60 TABLET | Refills: 0 | Status: SHIPPED | OUTPATIENT
Start: 2023-07-03 | End: 2023-08-02

## 2023-07-05 RX ORDER — LANCETS 33 GAUGE
EACH MISCELLANEOUS
Refills: 10 | OUTPATIENT
Start: 2023-07-05

## 2023-07-06 RX ORDER — PRIMIDONE 50 MG/1
50 TABLET ORAL NIGHTLY
Qty: 30 TABLET | Refills: 2 | Status: SHIPPED | OUTPATIENT
Start: 2023-07-06 | End: 2023-10-04

## 2023-07-10 ENCOUNTER — TELEPHONE (OUTPATIENT)
Dept: PODIATRY | Age: 68
End: 2023-07-10

## 2023-07-10 DIAGNOSIS — L97.509 TYPE 2 DIABETES MELLITUS WITH FOOT ULCER, UNSPECIFIED WHETHER LONG TERM INSULIN USE (HCC): Primary | ICD-10-CM

## 2023-07-10 DIAGNOSIS — E11.621 TYPE 2 DIABETES MELLITUS WITH FOOT ULCER, UNSPECIFIED WHETHER LONG TERM INSULIN USE (HCC): Primary | ICD-10-CM

## 2023-07-10 DIAGNOSIS — I73.9 PAD (PERIPHERAL ARTERY DISEASE) (HCC): ICD-10-CM

## 2023-07-26 RX ORDER — BLOOD SUGAR DIAGNOSTIC
STRIP MISCELLANEOUS
Qty: 200 STRIP | Refills: 10 | Status: SHIPPED | OUTPATIENT
Start: 2023-07-26

## 2023-08-03 DIAGNOSIS — G89.4 CHRONIC PAIN SYNDROME: Primary | ICD-10-CM

## 2023-08-03 RX ORDER — OXYCODONE HCL 10 MG/1
10 TABLET, FILM COATED, EXTENDED RELEASE ORAL EVERY 12 HOURS
COMMUNITY
End: 2023-08-03 | Stop reason: SDUPTHER

## 2023-08-03 RX ORDER — OXYCODONE AND ACETAMINOPHEN 7.5; 325 MG/1; MG/1
1 TABLET ORAL EVERY 4 HOURS PRN
Qty: 180 TABLET | Refills: 0 | Status: SHIPPED | OUTPATIENT
Start: 2023-08-03 | End: 2023-09-02

## 2023-08-03 RX ORDER — OXYCODONE HCL 10 MG/1
10 TABLET, FILM COATED, EXTENDED RELEASE ORAL EVERY 12 HOURS
Qty: 60 EACH | Refills: 0 | Status: SHIPPED | OUTPATIENT
Start: 2023-08-03 | End: 2023-09-02

## 2023-08-03 RX ORDER — OXYCODONE AND ACETAMINOPHEN 7.5; 325 MG/1; MG/1
1 TABLET ORAL EVERY 4 HOURS PRN
COMMUNITY
End: 2023-08-03 | Stop reason: SDUPTHER

## 2023-08-14 ENCOUNTER — HOSPITAL ENCOUNTER (OUTPATIENT)
Dept: INTERVENTIONAL RADIOLOGY/VASCULAR | Age: 68
Discharge: HOME | End: 2023-08-16
Attending: PODIATRIST
Payer: COMMERCIAL

## 2023-08-14 DIAGNOSIS — I73.9 PAD (PERIPHERAL ARTERY DISEASE) (HCC): ICD-10-CM

## 2023-08-14 PROCEDURE — 93922 UPR/L XTREMITY ART 2 LEVELS: CPT

## 2023-08-23 RX ORDER — MIRTAZAPINE 7.5 MG/1
TABLET, FILM COATED ORAL
Qty: 90 TABLET | Refills: 3 | Status: SHIPPED | OUTPATIENT
Start: 2023-08-23

## 2023-08-23 NOTE — TELEPHONE ENCOUNTER
Name of Medication(s) Requested:  Mirtazapine 7.5 mg    Pharmacy Requested:   [facility]    Medication(s) pended? [x] Yes  [] No    Last Appointment:  6/5/2023    Future appts:  Future Appointments   Date Time Provider [address] [place] [address]   9/27/2023  3:00  [facility] Floor Neurology -   12/11/2023  1:00 PM SHONDA Tapia - CNP [facility]DS [facility] [address]          Does patient need call back?   [] Yes  [x] No

## 2023-09-01 DIAGNOSIS — G89.4 CHRONIC PAIN SYNDROME: ICD-10-CM

## 2023-09-01 RX ORDER — OXYCODONE HCL 10 MG/1
10 TABLET, FILM COATED, EXTENDED RELEASE ORAL EVERY 12 HOURS
Qty: 60 EACH | Refills: 0 | Status: SHIPPED | OUTPATIENT
Start: 2023-09-01 | End: 2023-10-01

## 2023-09-01 RX ORDER — OXYCODONE AND ACETAMINOPHEN 7.5; 325 MG/1; MG/1
1 TABLET ORAL EVERY 4 HOURS PRN
Qty: 180 TABLET | Refills: 0 | Status: SHIPPED | OUTPATIENT
Start: 2023-09-01 | End: 2023-10-01

## 2023-09-05 DIAGNOSIS — E11.65 UNCONTROLLED TYPE 2 DIABETES MELLITUS WITH HYPERGLYCEMIA (HCC): ICD-10-CM

## 2023-09-06 DIAGNOSIS — E11.65 UNCONTROLLED TYPE 2 DIABETES MELLITUS WITH HYPERGLYCEMIA (HCC): ICD-10-CM

## 2023-09-06 RX ORDER — INSULIN LISPRO 100 [IU]/ML
INJECTION, SOLUTION INTRAVENOUS; SUBCUTANEOUS
Qty: 15 ML | Refills: 10 | Status: SHIPPED
Start: 2023-09-06 | End: 2023-09-07

## 2023-09-07 RX ORDER — INSULIN LISPRO 100 [IU]/ML
INJECTION, SOLUTION INTRAVENOUS; SUBCUTANEOUS
Qty: 15 ML | Refills: 10 | Status: SHIPPED | OUTPATIENT
Start: 2023-09-07

## 2023-09-15 DIAGNOSIS — Z89.611 S/P AKA (ABOVE KNEE AMPUTATION), RIGHT (HCC): ICD-10-CM

## 2023-09-15 DIAGNOSIS — G89.4 CHRONIC PAIN SYNDROME: ICD-10-CM

## 2023-09-15 DIAGNOSIS — Z79.4 CONTROLLED TYPE 2 DIABETES MELLITUS WITH OTHER SPECIFIED COMPLICATION, WITH LONG-TERM CURRENT USE OF INSULIN (HCC): ICD-10-CM

## 2023-09-15 DIAGNOSIS — I73.9 PVD (PERIPHERAL VASCULAR DISEASE) (HCC): ICD-10-CM

## 2023-09-15 DIAGNOSIS — E11.69 CONTROLLED TYPE 2 DIABETES MELLITUS WITH OTHER SPECIFIED COMPLICATION, WITH LONG-TERM CURRENT USE OF INSULIN (HCC): ICD-10-CM

## 2023-09-18 RX ORDER — GABAPENTIN 800 MG/1
TABLET ORAL
Qty: 120 TABLET | Refills: 10 | Status: SHIPPED | OUTPATIENT
Start: 2023-09-18 | End: 2024-09-17

## 2023-09-18 RX ORDER — DULOXETIN HYDROCHLORIDE 60 MG/1
CAPSULE, DELAYED RELEASE ORAL
Qty: 60 CAPSULE | Refills: 10 | Status: SHIPPED | OUTPATIENT
Start: 2023-09-18

## 2023-09-20 DIAGNOSIS — E11.65 UNCONTROLLED TYPE 2 DIABETES MELLITUS WITH HYPERGLYCEMIA (HCC): ICD-10-CM

## 2023-09-20 RX ORDER — INSULIN GLARGINE 100 [IU]/ML
INJECTION, SOLUTION SUBCUTANEOUS
Qty: 15 ML | Refills: 10 | Status: SHIPPED | OUTPATIENT
Start: 2023-09-20

## 2023-09-25 ENCOUNTER — HOSPITAL ENCOUNTER (INPATIENT)
Age: 68
LOS: 2 days | Discharge: ANOTHER HOSPITAL | DRG: 300 | End: 2023-09-28
Attending: STUDENT IN AN ORGANIZED HEALTH CARE EDUCATION/TRAINING PROGRAM | Admitting: INTERNAL MEDICINE
Payer: MEDICARE

## 2023-09-25 ENCOUNTER — OFFICE VISIT (OUTPATIENT)
Dept: PRIMARY CARE CLINIC | Age: 68
End: 2023-09-25
Payer: COMMERCIAL

## 2023-09-25 ENCOUNTER — APPOINTMENT (OUTPATIENT)
Dept: GENERAL RADIOLOGY | Age: 68
DRG: 300 | End: 2023-09-25
Payer: MEDICARE

## 2023-09-25 VITALS
BODY MASS INDEX: 31.46 KG/M2 | SYSTOLIC BLOOD PRESSURE: 148 MMHG | TEMPERATURE: 97.6 F | HEIGHT: 74 IN | OXYGEN SATURATION: 98 % | DIASTOLIC BLOOD PRESSURE: 76 MMHG | HEART RATE: 77 BPM

## 2023-09-25 DIAGNOSIS — E11.622 TYPE 2 DIABETES MELLITUS WITH DIABETIC LEG ULCER (HCC): Primary | ICD-10-CM

## 2023-09-25 DIAGNOSIS — L97.909 TYPE 2 DIABETES MELLITUS WITH DIABETIC LEG ULCER (HCC): Primary | ICD-10-CM

## 2023-09-25 DIAGNOSIS — T14.8XXA WOUND INFECTION: Primary | ICD-10-CM

## 2023-09-25 DIAGNOSIS — R19.7 DIARRHEA, UNSPECIFIED TYPE: ICD-10-CM

## 2023-09-25 DIAGNOSIS — L08.9 WOUND INFECTION: Primary | ICD-10-CM

## 2023-09-25 LAB
ALBUMIN SERPL-MCNC: 3.1 G/DL (ref 3.5–5.2)
ALP SERPL-CCNC: 132 U/L (ref 40–129)
ALT SERPL-CCNC: 40 U/L (ref 0–40)
ANION GAP SERPL CALCULATED.3IONS-SCNC: 12 MMOL/L (ref 7–16)
AST SERPL-CCNC: 34 U/L (ref 0–39)
B-OH-BUTYR SERPL-MCNC: 0.1 MMOL/L (ref 0.02–0.27)
BASOPHILS # BLD: 0.07 K/UL (ref 0–0.2)
BASOPHILS NFR BLD: 1 % (ref 0–2)
BILIRUB SERPL-MCNC: 0.2 MG/DL (ref 0–1.2)
BUN SERPL-MCNC: 26 MG/DL (ref 6–23)
CALCIUM SERPL-MCNC: 9.2 MG/DL (ref 8.6–10.2)
CHLORIDE SERPL-SCNC: 101 MMOL/L (ref 98–107)
CO2 SERPL-SCNC: 26 MMOL/L (ref 22–29)
CREAT SERPL-MCNC: 1.8 MG/DL (ref 0.7–1.2)
EOSINOPHIL # BLD: 0.1 K/UL (ref 0.05–0.5)
EOSINOPHILS RELATIVE PERCENT: 1 % (ref 0–6)
ERYTHROCYTE [DISTWIDTH] IN BLOOD BY AUTOMATED COUNT: 14.4 % (ref 11.5–15)
ERYTHROCYTE [SEDIMENTATION RATE] IN BLOOD BY WESTERGREN METHOD: 88 MM/HR (ref 0–15)
GFR SERPL CREATININE-BSD FRML MDRD: 40 ML/MIN/1.73M2
GLUCOSE SERPL-MCNC: 169 MG/DL (ref 74–99)
HCT VFR BLD AUTO: 33.6 % (ref 37–54)
HGB BLD-MCNC: 10.9 G/DL (ref 12.5–16.5)
IMM GRANULOCYTES # BLD AUTO: 0.05 K/UL (ref 0–0.58)
IMM GRANULOCYTES NFR BLD: 0 % (ref 0–5)
LACTATE BLDV-SCNC: 1 MMOL/L (ref 0.5–2.2)
LYMPHOCYTES NFR BLD: 2.11 K/UL (ref 1.5–4)
LYMPHOCYTES RELATIVE PERCENT: 16 % (ref 20–42)
MCH RBC QN AUTO: 25.6 PG (ref 26–35)
MCHC RBC AUTO-ENTMCNC: 32.4 G/DL (ref 32–34.5)
MCV RBC AUTO: 78.9 FL (ref 80–99.9)
MONOCYTES NFR BLD: 0.89 K/UL (ref 0.1–0.95)
MONOCYTES NFR BLD: 7 % (ref 2–12)
NEUTROPHILS NFR BLD: 75 % (ref 43–80)
NEUTS SEG NFR BLD: 9.64 K/UL (ref 1.8–7.3)
PH VENOUS: 7.36 (ref 7.35–7.45)
PLATELET # BLD AUTO: 291 K/UL (ref 130–450)
PMV BLD AUTO: 10.5 FL (ref 7–12)
POTASSIUM SERPL-SCNC: 4 MMOL/L (ref 3.5–5)
PROT SERPL-MCNC: 7.6 G/DL (ref 6.4–8.3)
RBC # BLD AUTO: 4.26 M/UL (ref 3.8–5.8)
SODIUM SERPL-SCNC: 139 MMOL/L (ref 132–146)
WBC OTHER # BLD: 12.9 K/UL (ref 4.5–11.5)

## 2023-09-25 PROCEDURE — 87075 CULTR BACTERIA EXCEPT BLOOD: CPT

## 2023-09-25 PROCEDURE — 3077F SYST BP >= 140 MM HG: CPT | Performed by: FAMILY MEDICINE

## 2023-09-25 PROCEDURE — 82010 KETONE BODYS QUAN: CPT

## 2023-09-25 PROCEDURE — 86403 PARTICLE AGGLUT ANTBDY SCRN: CPT

## 2023-09-25 PROCEDURE — 82800 BLOOD PH: CPT

## 2023-09-25 PROCEDURE — 1123F ACP DISCUSS/DSCN MKR DOCD: CPT | Performed by: FAMILY MEDICINE

## 2023-09-25 PROCEDURE — 73600 X-RAY EXAM OF ANKLE: CPT

## 2023-09-25 PROCEDURE — 85025 COMPLETE CBC W/AUTO DIFF WBC: CPT

## 2023-09-25 PROCEDURE — 87185 SC STD ENZYME DETCJ PER NZM: CPT

## 2023-09-25 PROCEDURE — 3078F DIAST BP <80 MM HG: CPT | Performed by: FAMILY MEDICINE

## 2023-09-25 PROCEDURE — 99213 OFFICE O/P EST LOW 20 MIN: CPT | Performed by: FAMILY MEDICINE

## 2023-09-25 PROCEDURE — 87205 SMEAR GRAM STAIN: CPT

## 2023-09-25 PROCEDURE — 86140 C-REACTIVE PROTEIN: CPT

## 2023-09-25 PROCEDURE — 80053 COMPREHEN METABOLIC PANEL: CPT

## 2023-09-25 PROCEDURE — G8427 DOCREV CUR MEDS BY ELIG CLIN: HCPCS | Performed by: FAMILY MEDICINE

## 2023-09-25 PROCEDURE — G8417 CALC BMI ABV UP PARAM F/U: HCPCS | Performed by: FAMILY MEDICINE

## 2023-09-25 PROCEDURE — 85652 RBC SED RATE AUTOMATED: CPT

## 2023-09-25 PROCEDURE — 2022F DILAT RTA XM EVC RTNOPTHY: CPT | Performed by: FAMILY MEDICINE

## 2023-09-25 PROCEDURE — 83605 ASSAY OF LACTIC ACID: CPT

## 2023-09-25 PROCEDURE — 99285 EMERGENCY DEPT VISIT HI MDM: CPT

## 2023-09-25 PROCEDURE — 87070 CULTURE OTHR SPECIMN AEROBIC: CPT

## 2023-09-25 PROCEDURE — 87040 BLOOD CULTURE FOR BACTERIA: CPT

## 2023-09-25 PROCEDURE — 73590 X-RAY EXAM OF LOWER LEG: CPT

## 2023-09-25 PROCEDURE — 4004F PT TOBACCO SCREEN RCVD TLK: CPT | Performed by: FAMILY MEDICINE

## 2023-09-25 PROCEDURE — 3046F HEMOGLOBIN A1C LEVEL >9.0%: CPT | Performed by: FAMILY MEDICINE

## 2023-09-25 PROCEDURE — 3017F COLORECTAL CA SCREEN DOC REV: CPT | Performed by: FAMILY MEDICINE

## 2023-09-25 PROCEDURE — 6370000000 HC RX 637 (ALT 250 FOR IP): Performed by: STUDENT IN AN ORGANIZED HEALTH CARE EDUCATION/TRAINING PROGRAM

## 2023-09-25 RX ORDER — OXYCODONE HYDROCHLORIDE AND ACETAMINOPHEN 5; 325 MG/1; MG/1
1 TABLET ORAL ONCE
Status: COMPLETED | OUTPATIENT
Start: 2023-09-25 | End: 2023-09-25

## 2023-09-25 RX ADMIN — OXYCODONE HYDROCHLORIDE AND ACETAMINOPHEN 1 TABLET: 5; 325 TABLET ORAL at 21:00

## 2023-09-25 ASSESSMENT — PAIN DESCRIPTION - LOCATION
LOCATION: ANKLE;LEG
LOCATION: FOOT

## 2023-09-25 ASSESSMENT — PAIN DESCRIPTION - ORIENTATION
ORIENTATION: LEFT
ORIENTATION: LOWER;LEFT

## 2023-09-25 ASSESSMENT — ENCOUNTER SYMPTOMS
APNEA: 1
DIARRHEA: 1
SHORTNESS OF BREATH: 0
BLOOD IN STOOL: 0
PHOTOPHOBIA: 0
CONSTIPATION: 0
BACK PAIN: 0

## 2023-09-25 ASSESSMENT — PAIN SCALES - GENERAL
PAINLEVEL_OUTOF10: 10
PAINLEVEL_OUTOF10: 10

## 2023-09-25 ASSESSMENT — PAIN DESCRIPTION - FREQUENCY: FREQUENCY: INTERMITTENT

## 2023-09-25 ASSESSMENT — PAIN DESCRIPTION - DESCRIPTORS
DESCRIPTORS: BURNING
DESCRIPTORS: ACHING;BURNING;DISCOMFORT

## 2023-09-25 ASSESSMENT — PAIN DESCRIPTION - PAIN TYPE: TYPE: ACUTE PAIN

## 2023-09-25 ASSESSMENT — PAIN - FUNCTIONAL ASSESSMENT: PAIN_FUNCTIONAL_ASSESSMENT: 0-10

## 2023-09-25 NOTE — TELEPHONE ENCOUNTER
Continue with current medication regimen. We will add hydralazine. Sent to pharmacy. We will start with 1 tablet twice daily. If blood pressures are still elevated switch to 3 times a day. (1) Other Medications/None

## 2023-09-25 NOTE — ED TRIAGE NOTES
FIRST PROVIDER CONTACT ASSESSMENT NOTE       Department of Emergency Medicine                 First Provider Note            23  4:50 PM EDT    Date of Encounter: No admission date for patient encounter. Patient Name: Tamir Childs  : 1955  MRN: [id number]    Chief Complaint: Wound Check (Left ankle ulcer, sent in by pcp / timur DM )      History of Present Illness:   Tamir Childs is a 74 y.o. male who presents to the ED for Patient has ulceration of left ankle, wound is developing an odor. He is having diarrhea. He denies fevers. Focused Physical Exam:  VS:    ED Triage Vitals   BP Temp Temp src Pulse Resp SpO2 Height Weight   -- -- -- -- -- -- -- --        Physical Ex: Constitutional: Alert and non-toxic.     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), Acute kidney injury ([address]), AKA stump complication ([address]), 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 ([address]), 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 ([address]), Cellulitis, scrotum, Coagulopathy ([address]), Critical lower limb ischemia ([address]), Diabetes mellitus ([address]), 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 2 diabetes mellitus, with fat layer exposed ([address]), Disruption of closure of muscle or muscle flap, sequela, DVT, lower extremity ([address]), Encounter for dental examination and cleaning with abnormal findings, Gas gangrene of thigh ([address]), History of DVT (deep vein thrombosis), Hyperglycemia due to type 2 diabetes mellitus ([address]), Hyperlipidemia, Hypertension, Ischemic ulcer of right thigh with fat layer exposed ([address]), Ischemic ulcer of right thigh with necrosis of muscle ([address]), 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 Pain syndrome, chronic, Postoperative wound infection, PVD (peripheral vascular disease) ([address]), Tobacco dependence, Vascular occlusion, Vitamin D deficiency, and Wound infection. Surgical History:  has a past surgical history that includes rhinoplasty (Right); pr i&d below fascia foot 1 bursal space (Right, 5/24/2018); femoral bypass; pr office/outpt visit,procedure only (Right, 8/3/2018); pr amputation thigh through femur re-amputation (Right, 11/1/2018); FEMORAL ENDARTERECTOMY (Right, 3/20/2020); Leg Surgery (Right, 4/17/2020); Leg Surgery (Right, 4/20/2020); Leg Surgery (Right, 4/21/2020); Leg Surgery (Right, 4/23/2020); AMPUTATION ABOVE KNEE (Right, 4/28/2020); AMPUTATION ABOVE KNEE (Right, 4/30/2020); Leg Surgery (Right, 10/15/2020); Leg Surgery (Right, 12/17/2020); Leg Surgery (Right, 5/14/2021); and Leg Surgery (Right, 5/18/2021). Social History:  reports that he has been smoking cigarettes. He has a 3.50 pack-year smoking history. He has never used smokeless tobacco. He reports that he does not drink alcohol and does not use drugs. Family History: family history includes Cancer in his mother and sister; Diabetes in his father; Heart Failure in his father; Hypertension in his father. Allergies: Patient has no known allergies.      Initial Plan of Care: Initiate Treatment-Testing, Proceed toTreatment Area When Bed Available for ED Attending/MLP to Continue Care      ---END OF FIRST PROVIDER CONTACT ASSESSMENT NOTE---  Electronically signed by LORRAINE Aguila   DD: 9/25/23
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
## 2023-09-26 PROBLEM — L08.9 WOUND INFECTION: Status: ACTIVE | Noted: 2023-09-26

## 2023-09-26 PROBLEM — E11.628 DIABETIC FOOT INFECTION (HCC): Status: ACTIVE | Noted: 2023-09-26

## 2023-09-26 PROBLEM — T14.8XXA WOUND INFECTION: Status: ACTIVE | Noted: 2023-09-26

## 2023-09-26 LAB
ALBUMIN SERPL-MCNC: 2.8 G/DL (ref 3.5–5.2)
ALP SERPL-CCNC: 118 U/L (ref 40–129)
ALT SERPL-CCNC: 34 U/L (ref 0–40)
ANION GAP SERPL CALCULATED.3IONS-SCNC: 11 MMOL/L (ref 7–16)
AST SERPL-CCNC: 26 U/L (ref 0–39)
BILIRUB SERPL-MCNC: 0.2 MG/DL (ref 0–1.2)
BUN SERPL-MCNC: 24 MG/DL (ref 6–23)
CALCIUM SERPL-MCNC: 8.8 MG/DL (ref 8.6–10.2)
CHLORIDE SERPL-SCNC: 102 MMOL/L (ref 98–107)
CO2 SERPL-SCNC: 25 MMOL/L (ref 22–29)
CREAT SERPL-MCNC: 1.8 MG/DL (ref 0.7–1.2)
CRP SERPL HS-MCNC: 74 MG/L (ref 0–5)
GFR SERPL CREATININE-BSD FRML MDRD: 42 ML/MIN/1.73M2
GLUCOSE BLD-MCNC: 138 MG/DL (ref 74–99)
GLUCOSE BLD-MCNC: 179 MG/DL (ref 74–99)
GLUCOSE BLD-MCNC: 179 MG/DL (ref 74–99)
GLUCOSE BLD-MCNC: 194 MG/DL (ref 74–99)
GLUCOSE BLD-MCNC: 212 MG/DL (ref 74–99)
GLUCOSE SERPL-MCNC: 186 MG/DL (ref 74–99)
POTASSIUM SERPL-SCNC: 3.9 MMOL/L (ref 3.5–5)
PROT SERPL-MCNC: 7 G/DL (ref 6.4–8.3)
SODIUM SERPL-SCNC: 138 MMOL/L (ref 132–146)

## 2023-09-26 PROCEDURE — 6370000000 HC RX 637 (ALT 250 FOR IP): Performed by: NURSE PRACTITIONER

## 2023-09-26 PROCEDURE — 6370000000 HC RX 637 (ALT 250 FOR IP): Performed by: INTERNAL MEDICINE

## 2023-09-26 PROCEDURE — 1200000000 HC SEMI PRIVATE

## 2023-09-26 PROCEDURE — 82962 GLUCOSE BLOOD TEST: CPT

## 2023-09-26 PROCEDURE — 2580000003 HC RX 258: Performed by: STUDENT IN AN ORGANIZED HEALTH CARE EDUCATION/TRAINING PROGRAM

## 2023-09-26 PROCEDURE — 97161 PT EVAL LOW COMPLEX 20 MIN: CPT

## 2023-09-26 PROCEDURE — 80053 COMPREHEN METABOLIC PANEL: CPT

## 2023-09-26 PROCEDURE — 6360000002 HC RX W HCPCS: Performed by: NURSE PRACTITIONER

## 2023-09-26 PROCEDURE — 6360000002 HC RX W HCPCS: Performed by: STUDENT IN AN ORGANIZED HEALTH CARE EDUCATION/TRAINING PROGRAM

## 2023-09-26 PROCEDURE — 2580000003 HC RX 258: Performed by: NURSE PRACTITIONER

## 2023-09-26 RX ORDER — DEXTROSE MONOHYDRATE 100 MG/ML
INJECTION, SOLUTION INTRAVENOUS CONTINUOUS PRN
Status: CANCELLED | OUTPATIENT
Start: 2023-09-26

## 2023-09-26 RX ORDER — SENNOSIDES A AND B 8.6 MG/1
1 TABLET, FILM COATED ORAL DAILY PRN
Status: CANCELLED | OUTPATIENT
Start: 2023-09-26

## 2023-09-26 RX ORDER — DULOXETIN HYDROCHLORIDE 60 MG/1
60 CAPSULE, DELAYED RELEASE ORAL DAILY
Status: DISCONTINUED | OUTPATIENT
Start: 2023-09-26 | End: 2023-09-28 | Stop reason: HOSPADM

## 2023-09-26 RX ORDER — SENNOSIDES A AND B 8.6 MG/1
1 TABLET, FILM COATED ORAL DAILY PRN
Status: DISCONTINUED | OUTPATIENT
Start: 2023-09-26 | End: 2023-09-28 | Stop reason: HOSPADM

## 2023-09-26 RX ORDER — CLONIDINE HYDROCHLORIDE 0.1 MG/1
0.1 TABLET ORAL 3 TIMES DAILY
Status: CANCELLED | OUTPATIENT
Start: 2023-09-26

## 2023-09-26 RX ORDER — SODIUM CHLORIDE 0.9 % (FLUSH) 0.9 %
10 SYRINGE (ML) INJECTION EVERY 12 HOURS SCHEDULED
Status: CANCELLED | OUTPATIENT
Start: 2023-09-26

## 2023-09-26 RX ORDER — LACTOBACILLUS RHAMNOSUS GG 10B CELL
2 CAPSULE ORAL DAILY
Status: CANCELLED | OUTPATIENT
Start: 2023-09-27

## 2023-09-26 RX ORDER — HYDRALAZINE HYDROCHLORIDE 50 MG/1
50 TABLET, FILM COATED ORAL 2 TIMES DAILY
Status: DISCONTINUED | OUTPATIENT
Start: 2023-09-26 | End: 2023-09-28 | Stop reason: HOSPADM

## 2023-09-26 RX ORDER — INSULIN LISPRO 100 [IU]/ML
0-4 INJECTION, SOLUTION INTRAVENOUS; SUBCUTANEOUS NIGHTLY
Status: DISCONTINUED | OUTPATIENT
Start: 2023-09-26 | End: 2023-09-28 | Stop reason: HOSPADM

## 2023-09-26 RX ORDER — POTASSIUM CHLORIDE 7.45 MG/ML
10 INJECTION INTRAVENOUS PRN
Status: DISCONTINUED | OUTPATIENT
Start: 2023-09-26 | End: 2023-09-28 | Stop reason: HOSPADM

## 2023-09-26 RX ORDER — POTASSIUM CHLORIDE 20 MEQ/1
40 TABLET, EXTENDED RELEASE ORAL PRN
Status: DISCONTINUED | OUTPATIENT
Start: 2023-09-26 | End: 2023-09-28 | Stop reason: HOSPADM

## 2023-09-26 RX ORDER — INSULIN GLARGINE 100 [IU]/ML
10 INJECTION, SOLUTION SUBCUTANEOUS EVERY MORNING
Status: CANCELLED | OUTPATIENT
Start: 2023-09-27

## 2023-09-26 RX ORDER — INSULIN GLARGINE 100 [IU]/ML
30 INJECTION, SOLUTION SUBCUTANEOUS NIGHTLY
Status: DISCONTINUED | OUTPATIENT
Start: 2023-09-26 | End: 2023-09-28 | Stop reason: HOSPADM

## 2023-09-26 RX ORDER — INSULIN LISPRO 100 [IU]/ML
0-8 INJECTION, SOLUTION INTRAVENOUS; SUBCUTANEOUS
Status: CANCELLED | OUTPATIENT
Start: 2023-09-26

## 2023-09-26 RX ORDER — CHLORTHALIDONE 25 MG/1
25 TABLET ORAL DAILY
Status: CANCELLED | OUTPATIENT
Start: 2023-09-27

## 2023-09-26 RX ORDER — LANOLIN ALCOHOL/MO/W.PET/CERES
3 CREAM (GRAM) TOPICAL NIGHTLY PRN
Status: CANCELLED | OUTPATIENT
Start: 2023-09-26

## 2023-09-26 RX ORDER — ACETAMINOPHEN 650 MG/1
650 SUPPOSITORY RECTAL EVERY 6 HOURS PRN
Status: DISCONTINUED | OUTPATIENT
Start: 2023-09-26 | End: 2023-09-28 | Stop reason: HOSPADM

## 2023-09-26 RX ORDER — ENOXAPARIN SODIUM 100 MG/ML
30 INJECTION SUBCUTANEOUS 2 TIMES DAILY
Status: CANCELLED | OUTPATIENT
Start: 2023-09-26

## 2023-09-26 RX ORDER — SODIUM CHLORIDE 0.9 % (FLUSH) 0.9 %
10 SYRINGE (ML) INJECTION EVERY 12 HOURS SCHEDULED
Status: DISCONTINUED | OUTPATIENT
Start: 2023-09-26 | End: 2023-09-28 | Stop reason: HOSPADM

## 2023-09-26 RX ORDER — SODIUM CHLORIDE 9 MG/ML
INJECTION, SOLUTION INTRAVENOUS PRN
Status: CANCELLED | OUTPATIENT
Start: 2023-09-26

## 2023-09-26 RX ORDER — AMLODIPINE BESYLATE 10 MG/1
10 TABLET ORAL DAILY
Status: DISCONTINUED | OUTPATIENT
Start: 2023-09-26 | End: 2023-09-28 | Stop reason: HOSPADM

## 2023-09-26 RX ORDER — MIRTAZAPINE 15 MG/1
7.5 TABLET, FILM COATED ORAL NIGHTLY
Status: DISCONTINUED | OUTPATIENT
Start: 2023-09-26 | End: 2023-09-28 | Stop reason: HOSPADM

## 2023-09-26 RX ORDER — MAGNESIUM SULFATE IN WATER 40 MG/ML
2000 INJECTION, SOLUTION INTRAVENOUS PRN
Status: DISCONTINUED | OUTPATIENT
Start: 2023-09-26 | End: 2023-09-28 | Stop reason: HOSPADM

## 2023-09-26 RX ORDER — ACETAMINOPHEN 325 MG/1
650 TABLET ORAL EVERY 6 HOURS PRN
Status: DISCONTINUED | OUTPATIENT
Start: 2023-09-26 | End: 2023-09-28 | Stop reason: HOSPADM

## 2023-09-26 RX ORDER — DULOXETIN HYDROCHLORIDE 60 MG/1
60 CAPSULE, DELAYED RELEASE ORAL DAILY
Status: CANCELLED | OUTPATIENT
Start: 2023-09-27

## 2023-09-26 RX ORDER — ENOXAPARIN SODIUM 100 MG/ML
30 INJECTION SUBCUTANEOUS 2 TIMES DAILY
Status: DISCONTINUED | OUTPATIENT
Start: 2023-09-26 | End: 2023-09-28 | Stop reason: HOSPADM

## 2023-09-26 RX ORDER — ACETAMINOPHEN 650 MG/1
650 SUPPOSITORY RECTAL EVERY 6 HOURS PRN
Status: CANCELLED | OUTPATIENT
Start: 2023-09-26

## 2023-09-26 RX ORDER — POTASSIUM CHLORIDE 7.45 MG/ML
10 INJECTION INTRAVENOUS PRN
Status: CANCELLED | OUTPATIENT
Start: 2023-09-26

## 2023-09-26 RX ORDER — CILOSTAZOL 50 MG/1
50 TABLET ORAL
Status: DISCONTINUED | OUTPATIENT
Start: 2023-09-26 | End: 2023-09-28 | Stop reason: HOSPADM

## 2023-09-26 RX ORDER — OXYCODONE HYDROCHLORIDE 5 MG/1
2.5 TABLET ORAL EVERY 4 HOURS PRN
Status: DISCONTINUED | OUTPATIENT
Start: 2023-09-26 | End: 2023-09-28 | Stop reason: HOSPADM

## 2023-09-26 RX ORDER — MAGNESIUM SULFATE IN WATER 40 MG/ML
2000 INJECTION, SOLUTION INTRAVENOUS PRN
Status: CANCELLED | OUTPATIENT
Start: 2023-09-26

## 2023-09-26 RX ORDER — SODIUM CHLORIDE 0.9 % (FLUSH) 0.9 %
10 SYRINGE (ML) INJECTION PRN
Status: DISCONTINUED | OUTPATIENT
Start: 2023-09-26 | End: 2023-09-28 | Stop reason: HOSPADM

## 2023-09-26 RX ORDER — GABAPENTIN 400 MG/1
800 CAPSULE ORAL 4 TIMES DAILY
Status: DISCONTINUED | OUTPATIENT
Start: 2023-09-26 | End: 2023-09-26

## 2023-09-26 RX ORDER — LACTOBACILLUS RHAMNOSUS GG 10B CELL
2 CAPSULE ORAL DAILY
Status: DISCONTINUED | OUTPATIENT
Start: 2023-09-26 | End: 2023-09-28 | Stop reason: HOSPADM

## 2023-09-26 RX ORDER — POTASSIUM CHLORIDE 20 MEQ/1
40 TABLET, EXTENDED RELEASE ORAL PRN
Status: CANCELLED | OUTPATIENT
Start: 2023-09-26

## 2023-09-26 RX ORDER — ACETAMINOPHEN 325 MG/1
650 TABLET ORAL EVERY 6 HOURS PRN
Status: CANCELLED | OUTPATIENT
Start: 2023-09-26

## 2023-09-26 RX ORDER — INSULIN LISPRO 100 [IU]/ML
0-4 INJECTION, SOLUTION INTRAVENOUS; SUBCUTANEOUS NIGHTLY
Status: CANCELLED | OUTPATIENT
Start: 2023-09-26

## 2023-09-26 RX ORDER — ONDANSETRON 4 MG/1
4 TABLET, ORALLY DISINTEGRATING ORAL EVERY 8 HOURS PRN
Status: CANCELLED | OUTPATIENT
Start: 2023-09-26

## 2023-09-26 RX ORDER — OXYCODONE HCL 10 MG/1
10 TABLET, FILM COATED, EXTENDED RELEASE ORAL EVERY 12 HOURS
Status: CANCELLED | OUTPATIENT
Start: 2023-09-26

## 2023-09-26 RX ORDER — MIRTAZAPINE 15 MG/1
7.5 TABLET, FILM COATED ORAL NIGHTLY
Status: CANCELLED | OUTPATIENT
Start: 2023-09-26

## 2023-09-26 RX ORDER — GABAPENTIN 300 MG/1
600 CAPSULE ORAL 3 TIMES DAILY
Status: DISCONTINUED | OUTPATIENT
Start: 2023-09-26 | End: 2023-09-27

## 2023-09-26 RX ORDER — SODIUM CHLORIDE 9 MG/ML
INJECTION, SOLUTION INTRAVENOUS PRN
Status: DISCONTINUED | OUTPATIENT
Start: 2023-09-26 | End: 2023-09-28 | Stop reason: HOSPADM

## 2023-09-26 RX ORDER — INSULIN LISPRO 100 [IU]/ML
0-8 INJECTION, SOLUTION INTRAVENOUS; SUBCUTANEOUS
Status: DISCONTINUED | OUTPATIENT
Start: 2023-09-26 | End: 2023-09-28 | Stop reason: HOSPADM

## 2023-09-26 RX ORDER — INSULIN GLARGINE 100 [IU]/ML
10 INJECTION, SOLUTION SUBCUTANEOUS EVERY MORNING
Status: DISCONTINUED | OUTPATIENT
Start: 2023-09-26 | End: 2023-09-28 | Stop reason: HOSPADM

## 2023-09-26 RX ORDER — GABAPENTIN 300 MG/1
600 CAPSULE ORAL 3 TIMES DAILY
Status: CANCELLED | OUTPATIENT
Start: 2023-09-26

## 2023-09-26 RX ORDER — ONDANSETRON 2 MG/ML
4 INJECTION INTRAMUSCULAR; INTRAVENOUS EVERY 6 HOURS PRN
Status: DISCONTINUED | OUTPATIENT
Start: 2023-09-26 | End: 2023-09-28 | Stop reason: HOSPADM

## 2023-09-26 RX ORDER — OXYCODONE HCL 10 MG/1
10 TABLET, FILM COATED, EXTENDED RELEASE ORAL EVERY 12 HOURS
Status: DISCONTINUED | OUTPATIENT
Start: 2023-09-26 | End: 2023-09-28 | Stop reason: HOSPADM

## 2023-09-26 RX ORDER — PRIMIDONE 50 MG/1
50 TABLET ORAL NIGHTLY
Status: DISCONTINUED | OUTPATIENT
Start: 2023-09-26 | End: 2023-09-28 | Stop reason: HOSPADM

## 2023-09-26 RX ORDER — ONDANSETRON 2 MG/ML
4 INJECTION INTRAMUSCULAR; INTRAVENOUS EVERY 6 HOURS PRN
Status: CANCELLED | OUTPATIENT
Start: 2023-09-26

## 2023-09-26 RX ORDER — INSULIN GLARGINE 100 [IU]/ML
30 INJECTION, SOLUTION SUBCUTANEOUS NIGHTLY
Status: CANCELLED | OUTPATIENT
Start: 2023-09-26

## 2023-09-26 RX ORDER — PRIMIDONE 50 MG/1
50 TABLET ORAL NIGHTLY
Qty: 30 TABLET | Refills: 2 | Status: ON HOLD | OUTPATIENT
Start: 2023-09-26 | End: 2023-12-25

## 2023-09-26 RX ORDER — CLONIDINE HYDROCHLORIDE 0.1 MG/1
0.1 TABLET ORAL 3 TIMES DAILY
Status: DISCONTINUED | OUTPATIENT
Start: 2023-09-26 | End: 2023-09-28 | Stop reason: HOSPADM

## 2023-09-26 RX ORDER — SODIUM CHLORIDE 0.9 % (FLUSH) 0.9 %
10 SYRINGE (ML) INJECTION PRN
Status: CANCELLED | OUTPATIENT
Start: 2023-09-26

## 2023-09-26 RX ORDER — AMLODIPINE BESYLATE 10 MG/1
10 TABLET ORAL DAILY
Status: CANCELLED | OUTPATIENT
Start: 2023-09-27

## 2023-09-26 RX ORDER — ONDANSETRON 4 MG/1
4 TABLET, ORALLY DISINTEGRATING ORAL EVERY 8 HOURS PRN
Status: DISCONTINUED | OUTPATIENT
Start: 2023-09-26 | End: 2023-09-28 | Stop reason: HOSPADM

## 2023-09-26 RX ORDER — CHLORTHALIDONE 25 MG/1
25 TABLET ORAL DAILY
Status: DISCONTINUED | OUTPATIENT
Start: 2023-09-26 | End: 2023-09-28 | Stop reason: HOSPADM

## 2023-09-26 RX ORDER — LANOLIN ALCOHOL/MO/W.PET/CERES
3 CREAM (GRAM) TOPICAL NIGHTLY PRN
Status: DISCONTINUED | OUTPATIENT
Start: 2023-09-26 | End: 2023-09-28 | Stop reason: HOSPADM

## 2023-09-26 RX ORDER — DEXTROSE MONOHYDRATE 100 MG/ML
INJECTION, SOLUTION INTRAVENOUS CONTINUOUS PRN
Status: DISCONTINUED | OUTPATIENT
Start: 2023-09-26 | End: 2023-09-28 | Stop reason: HOSPADM

## 2023-09-26 RX ORDER — OXYCODONE AND ACETAMINOPHEN 7.5; 325 MG/1; MG/1
1 TABLET ORAL EVERY 4 HOURS PRN
Status: DISCONTINUED | OUTPATIENT
Start: 2023-09-26 | End: 2023-09-26 | Stop reason: CLARIF

## 2023-09-26 RX ORDER — OXYCODONE HYDROCHLORIDE AND ACETAMINOPHEN 5; 325 MG/1; MG/1
1 TABLET ORAL EVERY 4 HOURS PRN
Status: DISCONTINUED | OUTPATIENT
Start: 2023-09-26 | End: 2023-09-28 | Stop reason: HOSPADM

## 2023-09-26 RX ORDER — PRIMIDONE 50 MG/1
50 TABLET ORAL NIGHTLY
Status: CANCELLED | OUTPATIENT
Start: 2023-09-26

## 2023-09-26 RX ORDER — HYDRALAZINE HYDROCHLORIDE 50 MG/1
50 TABLET, FILM COATED ORAL 2 TIMES DAILY
Status: CANCELLED | OUTPATIENT
Start: 2023-09-26

## 2023-09-26 RX ORDER — CILOSTAZOL 50 MG/1
50 TABLET ORAL
Status: CANCELLED | OUTPATIENT
Start: 2023-09-26

## 2023-09-26 RX ADMIN — AMLODIPINE BESYLATE 10 MG: 10 TABLET ORAL at 09:59

## 2023-09-26 RX ADMIN — OXYCODONE HYDROCHLORIDE 2.5 MG: 5 TABLET ORAL at 06:35

## 2023-09-26 RX ADMIN — OXYCODONE AND ACETAMINOPHEN 1 TABLET: 5; 325 TABLET ORAL at 03:20

## 2023-09-26 RX ADMIN — CLONIDINE HYDROCHLORIDE 0.1 MG: 0.1 TABLET ORAL at 15:00

## 2023-09-26 RX ADMIN — OXYCODONE HYDROCHLORIDE 10 MG: 10 TABLET, FILM COATED, EXTENDED RELEASE ORAL at 21:05

## 2023-09-26 RX ADMIN — SODIUM CHLORIDE, PRESERVATIVE FREE 10 ML: 5 INJECTION INTRAVENOUS at 21:07

## 2023-09-26 RX ADMIN — ENOXAPARIN SODIUM 30 MG: 100 INJECTION SUBCUTANEOUS at 10:01

## 2023-09-26 RX ADMIN — HYDRALAZINE HYDROCHLORIDE 50 MG: 50 TABLET, FILM COATED ORAL at 21:06

## 2023-09-26 RX ADMIN — CHLORTHALIDONE 25 MG: 25 TABLET ORAL at 09:58

## 2023-09-26 RX ADMIN — ENOXAPARIN SODIUM 30 MG: 100 INJECTION SUBCUTANEOUS at 21:05

## 2023-09-26 RX ADMIN — CLONIDINE HYDROCHLORIDE 0.1 MG: 0.1 TABLET ORAL at 09:58

## 2023-09-26 RX ADMIN — HYDRALAZINE HYDROCHLORIDE 50 MG: 50 TABLET, FILM COATED ORAL at 09:59

## 2023-09-26 RX ADMIN — CILOSTAZOL 50 MG: 50 TABLET ORAL at 16:27

## 2023-09-26 RX ADMIN — Medication 3 MG: at 21:04

## 2023-09-26 RX ADMIN — INSULIN GLARGINE 10 UNITS: 100 INJECTION, SOLUTION SUBCUTANEOUS at 10:01

## 2023-09-26 RX ADMIN — PRIMIDONE 50 MG: 50 TABLET ORAL at 21:18

## 2023-09-26 RX ADMIN — OXYCODONE HYDROCHLORIDE 10 MG: 10 TABLET, FILM COATED, EXTENDED RELEASE ORAL at 09:57

## 2023-09-26 RX ADMIN — GABAPENTIN 600 MG: 300 CAPSULE ORAL at 21:04

## 2023-09-26 RX ADMIN — Medication 2 CAPSULE: at 09:59

## 2023-09-26 RX ADMIN — GABAPENTIN 600 MG: 300 CAPSULE ORAL at 09:57

## 2023-09-26 RX ADMIN — VANCOMYCIN HYDROCHLORIDE 2000 MG: 10 INJECTION, POWDER, LYOPHILIZED, FOR SOLUTION INTRAVENOUS at 01:37

## 2023-09-26 RX ADMIN — METOPROLOL TARTRATE 25 MG: 25 TABLET, FILM COATED ORAL at 21:06

## 2023-09-26 RX ADMIN — INSULIN GLARGINE 30 UNITS: 100 INJECTION, SOLUTION SUBCUTANEOUS at 21:07

## 2023-09-26 RX ADMIN — METOPROLOL TARTRATE 25 MG: 25 TABLET, FILM COATED ORAL at 09:58

## 2023-09-26 RX ADMIN — SODIUM CHLORIDE, PRESERVATIVE FREE 10 ML: 5 INJECTION INTRAVENOUS at 10:04

## 2023-09-26 RX ADMIN — GABAPENTIN 600 MG: 300 CAPSULE ORAL at 15:00

## 2023-09-26 RX ADMIN — OXYCODONE AND ACETAMINOPHEN 1 TABLET: 5; 325 TABLET ORAL at 16:26

## 2023-09-26 RX ADMIN — MIRTAZAPINE 7.5 MG: 15 TABLET, FILM COATED ORAL at 21:04

## 2023-09-26 RX ADMIN — DULOXETINE HYDROCHLORIDE 60 MG: 60 CAPSULE, DELAYED RELEASE ORAL at 09:59

## 2023-09-26 RX ADMIN — CILOSTAZOL 50 MG: 50 TABLET ORAL at 06:23

## 2023-09-26 RX ADMIN — CLONIDINE HYDROCHLORIDE 0.1 MG: 0.1 TABLET ORAL at 21:06

## 2023-09-26 RX ADMIN — CEFEPIME HYDROCHLORIDE 2000 MG: 2 INJECTION, POWDER, FOR SOLUTION INTRAVENOUS at 01:26

## 2023-09-26 ASSESSMENT — PAIN DESCRIPTION - ORIENTATION
ORIENTATION: LEFT

## 2023-09-26 ASSESSMENT — PAIN SCALES - GENERAL
PAINLEVEL_OUTOF10: 9
PAINLEVEL_OUTOF10: 2
PAINLEVEL_OUTOF10: 9
PAINLEVEL_OUTOF10: 10
PAINLEVEL_OUTOF10: 10

## 2023-09-26 ASSESSMENT — PAIN DESCRIPTION - LOCATION
LOCATION: ANKLE
LOCATION: FOOT
LOCATION: ANKLE
LOCATION: LEG
LOCATION: LEG

## 2023-09-26 ASSESSMENT — PAIN - FUNCTIONAL ASSESSMENT
PAIN_FUNCTIONAL_ASSESSMENT: PREVENTS OR INTERFERES WITH MANY ACTIVE NOT PASSIVE ACTIVITIES
PAIN_FUNCTIONAL_ASSESSMENT: 0-10

## 2023-09-26 ASSESSMENT — LIFESTYLE VARIABLES
HOW OFTEN DO YOU HAVE A DRINK CONTAINING ALCOHOL: NEVER
HOW MANY STANDARD DRINKS CONTAINING ALCOHOL DO YOU HAVE ON A TYPICAL DAY: PATIENT DOES NOT DRINK

## 2023-09-26 ASSESSMENT — PAIN DESCRIPTION - DESCRIPTORS
DESCRIPTORS: ACHING;BURNING;SORE;SHARP
DESCRIPTORS: ACHING;SORE
DESCRIPTORS: ACHING;BURNING

## 2023-09-26 NOTE — CONSULTS
[facility]  [facility]  Consultation Note     Admit Date: 9/25/2023  7:29 PM    Reason for Consult:   DFU    Attending Physician:  Hao Hunter DO    HISTORY OF PRESENT ILLNESS:             The history is obtained from extensive review of available past medical records. The patient is a 74 y.o. male who is previously known to the ID service. The patient has a history of type diabetes, neuropathy and peripheral arterial disease. He has had multiple infections in the right foot that ended up on an above-the-knee amputation. It was complicated by necrotizing infection with myositis and had multiple surgeries. He has been on suppressive Doxycycline for the last year. The patient accidentally hit the lateral aspect of his left leg above the ankle against his wheelchair a few weeks ago. There is developed into an ulcer. He says it is somewhat painful. His blood sugars have been in the 200s. He thinks he has had a fever but has not taken his temperature. He also says he has chronic diarrhea. He presented to [facility] on 9/25/2023 with the symptoms. On presentation he was afebrile. White count was slightly elevated. Cultures were taken and he was given Cefepime and vancomycin. He was seen by podiatry.   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 Daptomycin was eventually discontinued. He was discharged to [facility].    March 2020. Admitted to [facility] with a right thigh pain. He underwent embolectomy of the iliofemoral artery. He was treated with Vancomycin and Zosyn. Seen by Dr. Jenna Snow and treated with Vancomycin and Cefepime. Scrotal abscess cultures grew Staphylococcus hominis and Staphylococcus epidermidis. Antibiotics were consolidated to Linezolid. October 2018. Admitted to [facility] because of poor healing and persistent infection and gangrene of the right foot. Seen by Dr. Paresh Madrid. Blood cultures grew MRSA. He was treated with Daptomycin and Meropenem. He finally agreed to an AKA. JUSTO did not show vegetations. He completed Daptomycin during that admission    August 2018. Admitted to [facility] with falls and a diabetic foot wound with maggot infestation. Seen by Dr. Paresh Madrid. Treated with Vancomycin and Zosyn. Wound cultures grew alphahemolytic Streptococcus, E. coli, Klebsiella and Staphylococcus species. He was discharged on Ceftriaxone and Vancomycin x6 weeks. He was seen in the office in follow-up. Daptomycin and Ertapenem were started because ESR and CRP were still elevated. It was recommended that he go back to the hospital.    May 2018. Admitted to [facility] with a diabetic foot infection with osteomyelitis. He was treated with Clindamycin, Meropenem and Vancomycin. Seen by ID. Blood culture grew CoNS. Wound cultures grew Enterococcus, Streptococcus, anaerobes. Antibiotics were consolidated to Vancomycin and Unasyn. He was transferred to [facility] and followed by Dr. Jenna Snow. He refused an amputation. He was transferred to [facility] where he completed 6 weeks of antibiotics.     Past Surgical History:        Procedure Laterality Date    AMPUTATION ABOVE KNEE Right 4/28/2020    DEBRIDEMENT OF AMPUTATION RIGHT ABOVE KNEE

## 2023-09-26 NOTE — PROGRESS NOTES
4 Eyes Skin Assessment     NAME:  Maeve Lafleur  YOB: 1955  MEDICAL RECORD NUMBER:  [id number]    The patient is being assessed for  Admission    I agree that at least one RN has performed a thorough Head to Toe Skin Assessment on the patient. ALL assessment sites listed below have been assessed. Areas assessed by both nurses:    Head, Face, Ears, Shoulders, Back, Chest, Arms, Elbows, Hands, Sacrum. Buttock, Coccyx, Ischium, Legs. Feet and Heels, and Under Medical Devices         Does the Patient have a Wound? Yes wound(s) were present on assessment.  LDA wound assessment was Initiated and completed by RN       Tarun Prevention initiated by RN: Yes  Wound Care Orders initiated by RN: No    Pressure Injury (Stage 3,4, Unstageable, DTI, NWPT, and Complex wounds) if present, place Wound referral order by RN under : No    New Ostomies, if present place, Ostomy referral order under : No     Nurse 1 eSignature: Electronically signed by Alex Conti RN on 9/26/23 at 5:30 AM EDT    **SHARE this note so that the co-signing nurse can place an eSignature**    Nurse 2 eSignature: Electronically signed by Davey Camara on 9/26/23 at 5:53 AM EDT

## 2023-09-26 NOTE — CARE COORDINATION
Introduced my self and provided explanation of CM role to patient. Patient is awake, alert, and aware of current diagnosis and discharge planning. Patient is from home alone. Patient has a power wheelchair and a walker at home. Patient is established with a PCP Dr. Haylie Ryan. Patient was given a [facility] list to review for possible dressing changes or IF home IV antibiotics. Podiatry consulted, await plan. Cefepime ordered, ID consulted, await plan. Patient will need followed and assisted with discharge planning as necessary.      Electronically signed by Danna Peña RN on 9/26/2023 at 10:19 AM

## 2023-09-26 NOTE — CONSULTS
IntraVENous PRN Drema Eduin, APRN - CNP        Or    dextrose bolus 10% 250 mL  250 mL IntraVENous PRN Drema Eduin, APRN - CNP        glucagon injection 1 mg  1 mg SubCUTAneous PRN Drema Eduin, APRN - CNP        dextrose 10 % infusion   IntraVENous Continuous PRN Drema Eduin, APRN - CNP        sodium chloride flush 0.9 % injection 10 mL  10 mL IntraVENous 2 times per day Drema Eduin, APRN - CNP   10 mL at 09/26/23 1004    sodium chloride flush 0.9 % injection 10 mL  10 mL IntraVENous PRN Drema Eduin, APRN - CNP        0.9 % sodium chloride infusion   IntraVENous PRN Drema Eduin, APRN - CNP        potassium chloride (KLOR-CON M) extended release tablet 40 mEq  40 mEq Oral PRN Drema Eduin, APRN - CNP        Or    potassium bicarb-citric acid (EFFER-K) effervescent tablet 40 mEq  40 mEq Oral PRN Drema Eduin, APRN - CNP        Or    potassium chloride 10 mEq/100 mL IVPB (Peripheral Line)  10 mEq IntraVENous PRN Debbie Tobinin, APRN - CNP        magnesium sulfate 2000 mg in 50 mL IVPB premix  2,000 mg IntraVENous PRN Drema Eduin, APRN - CNP        enoxaparin Sodium (LOVENOX) injection 30 mg  30 mg SubCUTAneous BID Debbie Denny, APRN - CNP   30 mg at 09/26/23 1001    ondansetron (ZOFRAN-ODT) disintegrating tablet 4 mg  4 mg Oral Q8H PRN Debbie Denny, APRN - CNP        Or    ondansetron (ZOFRAN) injection 4 mg  4 mg IntraVENous Q6H PRN Debbie Denny, APRN - CNP        senna (SENOKOT) tablet 8.6 mg  1 tablet Oral Daily PRN Wildma Eduin, APRN - CNP        acetaminophen (TYLENOL) tablet 650 mg  650 mg Oral Q6H PRN Wildma Eduin, APRN - CNP        Or    acetaminophen (TYLENOL) suppository 650 mg  650 mg Rectal Q6H PRN Wildma Eduin, APRN - CNP        oxyCODONE-acetaminophen (PERCOCET) 5-325 MG per tablet 1 tablet  1 tablet Oral Q4H PRN Wildma Eduin, APRN - CNP   1 tablet at 09/26/23 0320    And    oxyCODONE (ROXICODONE) immediate release tablet 2.5 mg  2.5 mg Oral Q4H Nannette Cadena DPM   9/26/2023   10:16 AM       Thank you for involving podiatry in this patient's care.  Please do not hesitate to call with any questions, concerns, or new findings

## 2023-09-26 NOTE — CARE COORDINATION
Internal Medicine On-call Care Coordination Note    I was called by the ED physician because they recommended admission for this patient and we cover their PCP. The history as I understand it after discussion with the ED physician is as follows:    Sent in for foot wound check  Imaging showing irregular wound  Given cefepime and vanc    I placed admission orders. Including:    Podiatry consult  SSI  IV cefepime    Dr. Blaze Winkler, or our coverage will see the patient tomorrow for H&P.     Electronically signed by SHONDA Armendariz CNP on 9/26/2023 at 2:49 AM

## 2023-09-26 NOTE — ED NOTES
Spoke with nursing staff on 5W and notified them of electronic SBAR being done. Opportunity for questions given. Patient stable and ready for transport.       Amari Sosa RN  09/26/23 7812

## 2023-09-26 NOTE — PROGRESS NOTES
Renal Dose Adjustment Policy (Generic)     This patient is on medication that requires renal, weight, and/or indication dose adjustment. Date Body Weight IBW  Adjusted BW SCr  CrCl Dialysis status   9/26/2023 250 lb (113.4 kg) Ideal body weight: 82.2 kg (181 lb 3.5 oz)  Adjusted ideal body weight: 94.7 kg (208 lb 11.7 oz) Serum creatinine: 1.8 mg/dL (H) 09/26/23 0418  Estimated creatinine clearance: 55 mL/min (A) N/a       Pharmacy has dose-adjusted the following medication(s):    Date Previous Order Adjusted Order   9/26/2023 Cefepime 2 gm Q12H Cefepime 2 gm Q24H   09/26/2023 Gabapentin 800 mg QID Gabapentin 600 mg TID       These changes were made per protocol according to the [address] [address] [facility]   Automatic Renal Dose Adjustment Policy. *Please note this dose may need readjusted if patient's condition changes. Please contact pharmacy with any questions regarding these changes.     Cynthia Vazquez [facility]  9/26/2023  7:22 AM

## 2023-09-26 NOTE — PROGRESS NOTES
Received call from Dr. Burgess(podiatry) who stated that Dr. Kwan Landaverde is requesting patient be transferred to [facility] for vasc eval and potential intervention before podiatry can move forward with intervention. Electronically signed by Acacia Rosen RN on 9/26/2023 at 6559.

## 2023-09-26 NOTE — ACP (ADVANCE CARE PLANNING)
Advance Care Planning   Healthcare Decision Maker:    Primary Decision Maker: Rae Pearson - Brother/Sister - [phone]    Click here to complete Healthcare Decision Makers including selection of the Healthcare Decision Maker Relationship (ie \"Primary\"). Today we documented Decision Maker(s) consistent with Legal Next of Kin hierarchy.

## 2023-09-26 NOTE — H&P
Internal Medicine History & Physical     Name: Javed Bower  : 1955  Chief Complaint: Wound Check (Left ankle ulcer, sent in by pcp / hx DM. +diarrhea. Hx R above knee amputation. Reports fever but didn't measure.)  Primary Care Physician: Kaylee Freeman DO  Admission date: 2023  Date of service: 2023   Unit: [address] MED SURG     History of Present Illness  Tamia Mendoza is a 74y.o. year old male. He presented to the hospital with a wound on his left ankle/foot. He has a history of diabetes, PVD, right above-the-knee amputation. He had a wound on his left ankle and had a fever. He saw his PCP and after he evaluated the wound he was sent to the hospital.  He has seen podiatry in the past.  He is seen vascular surgery in the past.  Nothing in particular seem to make his symptoms better or worse. Overall symptoms are moderate in severity. He denies any other associated symptoms. There were no family or friends present at bedside. History is provided to the patient. He is felt to be a good historian. After discussion with podiatry they are recommending that he goes to [facility] for vascular surgery evaluation. The patient has agreed to this. I have discussed the case with the [facility] access center as well as an accepting hospitalist in [facility] and he will be transferred when a bed is available. ED course:   Initial blood work and imaging studies performed. Admission recommended by ED physician. My coverage discussed the case with the ED provider.  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[address]) 2020    Acute kidney injury ([address]) 2020    AKA stump complication ([address])     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 ([address]) 2018    Atherosclerosis of nonbiological bypass graft of Priority: Medium    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 [Z22.322]      Priority: Medium    Secondary hyperparathyroidism of renal origin ([address]) [N25.81]     Chronic pain syndrome [G89.4]     Above knee amputation of right lower extremity ([address]) [Q10.459Y]     Major depressive disorder, single episode, unspecified [F32.9]     Muscle weakness (generalized) [M62.81]     Obstructive sleep apnea (adult) (pediatric) [G47.33]     HLD (hyperlipidemia) [E78.5]     PVD (peripheral vascular disease) ([address]) [I73.9]     Stage 3 chronic kidney disease ([address]) [N18.30]     Tobacco dependence [F17.200]     DM II (diabetes mellitus, type II), controlled ([address]) [E11.9]     HTN (hypertension) [I10]     History of vascular surgery [Z98.890]        Plan  Left DFU w/ infection w/ known PVD:   H/o right AKA  Podiatry consultation-Case discussed on 9/26  Imaging per podiatry  Wound care per podiatry   ATB's per ID  ESR elevated, CRP elevated  Vascular surgery eval needed    DM, uncontrolled:   Continue home DM meds-- adjust as needed  SSI  HbA1c    PT/OT  Continue home medications. Follow labs  DVT prophylaxis with Lovenox  Please see orders for further management and care.  for discharge planning  Discharge plan: send to [facility] (1-) for vascular surgery eval-already accepted but waiting for bed assignment    Electronically signed by Aguila Khan DO on 9/26/2023 at 10:43 AM    I can be reached through [email or web address].

## 2023-09-26 NOTE — PROGRESS NOTES
Consult wounds left lower leg  Podiatry following  Maria Luisa Carvajal.  Geno Flores, CNS, Wound Care

## 2023-09-26 NOTE — PROGRESS NOTES
Occupational Therapy  OT consult received to eval/treat and chart review complete. Patient politely declined OT evaluation this date, reporting increased pain and fatigue. OT to re-attempt at a later date/time as able and appropriate.      Karen Lutz OTR/L  License Number: [id number]

## 2023-09-26 NOTE — PROGRESS NOTES
4/17/2020); Leg Surgery (Right, 4/20/2020); Leg Surgery (Right, 4/21/2020); Leg Surgery (Right, 4/23/2020); AMPUTATION ABOVE KNEE (Right, 4/28/2020); AMPUTATION ABOVE KNEE (Right, 4/30/2020); Leg Surgery (Right, 10/15/2020); Leg Surgery (Right, 12/17/2020); Leg Surgery (Right, 5/14/2021); and Leg Surgery (Right, 5/18/2021). Referring provider:  Nimisha Landaverde DO    PT Order:  PT eval and treat     Evaluating PT:  Dmitry Espinoza PT, DPT PT [id number]    Room #:  0241/5058-W  Diagnosis:  Wound infection [T14. 8XXA, L08.9]  Precautions:  fall risk, right AKA  Equipment Needs:  none. Pt owns a walker and power w/c and hospital bed. SUBJECTIVE:    Pt lives alone in a 1 story home with ramp to enter. Pt reported he was performing stand pivots with w/w to get into her power w/c. Pt has been having difficulty the past 2 weeks with mobility. OBJECTIVE:   Initial Evaluation  Date: 9/26 Treatment Short Term/ Long Term   Goals   Was pt agreeable to Eval/treatment? yes     Does pt have pain? Severe left LE pain and phantom pain on the right side     Bed Mobility  Rolling: Min A  Supine to sit:  SBA (increased time and effort to complete transfer due to pain. Pt used bed to assist)  Sit to supine: Min A  Scooting: Pt unable to side scoot along the side of the bed  SBA   Transfers Sit to stand: NT  Stand to sit: NT  Stand pivot: NT  Pt with severe pain in left LE so standing not attempted  Mod A   Ambulation    NT     Stair negotiation: ascended and descended  NT      ROM Right LE:  AKA  Left LE:  WFL     Strength Left LE:  grossly 2-3/5 (limited by pain)  Increase left LE strength by 1/2 mm grade   Balance Sitting EOB:  independent  Dynamic Standing:  NT  Sitting EOB:  independent   Dynamic Standing: Mod A   AM-PAC 6 Clicks 95/10       Pt is A & O x 3  Sensation:  decreased sensation in left LE and B UE.        Patient education  Pt educated on PT objectives during eval and while in the hospital    Patient response to education:   Pt verbalized understanding Pt demonstrated skill Pt requires further education in this area       yes     ASSESSMENT:    Conditions Requiring Skilled Therapeutic Intervention:    [x]Decreased strength     []Decreased ROM  [x]Decreased functional mobility  [x]Decreased balance   [x]Decreased endurance   []Decreased posture  [x]Decreased sensation  []Decreased coordination   []Decreased vision  []Decreased safety awareness   [x]Increased pain       Comments:  Pt found in bed. No report of dizziness once sitting EOB. Standing not attempted due to severe pain in right LE. Nursing was notified about pt's pain level. At end of eval, pt left in bed with call light in reach and bed alarm on.      Pt's/ family goals   1. Pain control    Prognosis is good for reaching above PT goals. Patient and or family understand(s) diagnosis, prognosis, and plan of care. yes    PHYSICAL THERAPY PLAN OF CARE:    PT POC is established based on physician order and patient diagnosis     Diagnosis:  Wound infection [T14. 8XXA, L08.9]    Current Treatment Recommendations:     [x] Strengthening to improve independence with functional mobility   [] ROM to improve independence with functional mobility   [x] Balance Training to improve static/dynamic balance and to reduce fall risk  [x] Endurance Training to improve activity tolerance during functional mobility   [x] Transfer Training to improve safety and independence with all functional transfers   [] Gait Training to improve gait mechanics, endurance and assess need for appropriate assistive device  [] Stair Training in preparation for safe discharge home and/or into the community   [x] Positioning to prevent skin breakdown and contractures  [x] Safety and Education Training   [x] Patient/Caregiver Education   [] HEP  [] Other     PT long term treatment goals are located in above grid    Frequency of treatments: 2-5x/week x 1-2 weeks.     Time in  0919  Time out

## 2023-09-26 NOTE — PATIENT CARE CONFERENCE
P Quality Flow/Interdisciplinary Rounds Progress Note        Quality Flow Rounds held on September 26, 2023    Disciplines Attending:  Bedside Nurse, , , and Nursing Unit Leadership    Preethi Kate was admitted on 9/25/2023  7:29 PM    Anticipated Discharge Date:       Disposition:    Tarun Score:  Tarun Scale Score: 16    Readmission Risk              Risk of Unplanned Readmission:  15           Discussed patient goal for the day, patient clinical progression, and barriers to discharge.   The following Goal(s) of the Day/Commitment(s) have been identified:  Diagnostics - Report Results pod and ID      Elisabeth Camilo RN  September 26, 2023

## 2023-09-27 LAB
ALBUMIN SERPL-MCNC: 2.8 G/DL (ref 3.5–5.2)
ALP SERPL-CCNC: 123 U/L (ref 40–129)
ALT SERPL-CCNC: 30 U/L (ref 0–40)
ANION GAP SERPL CALCULATED.3IONS-SCNC: 9 MMOL/L (ref 7–16)
AST SERPL-CCNC: 21 U/L (ref 0–39)
BASOPHILS # BLD: 0.04 K/UL (ref 0–0.2)
BASOPHILS NFR BLD: 0 % (ref 0–2)
BILIRUB SERPL-MCNC: 0.3 MG/DL (ref 0–1.2)
BUN SERPL-MCNC: 24 MG/DL (ref 6–23)
CALCIUM SERPL-MCNC: 8.9 MG/DL (ref 8.6–10.2)
CHLORIDE SERPL-SCNC: 100 MMOL/L (ref 98–107)
CO2 SERPL-SCNC: 26 MMOL/L (ref 22–29)
CREAT SERPL-MCNC: 1.9 MG/DL (ref 0.7–1.2)
EOSINOPHIL # BLD: 0.08 K/UL (ref 0.05–0.5)
EOSINOPHILS RELATIVE PERCENT: 1 % (ref 0–6)
ERYTHROCYTE [DISTWIDTH] IN BLOOD BY AUTOMATED COUNT: 14.1 % (ref 11.5–15)
GFR SERPL CREATININE-BSD FRML MDRD: 39 ML/MIN/1.73M2
GLUCOSE BLD-MCNC: 139 MG/DL (ref 74–99)
GLUCOSE BLD-MCNC: 158 MG/DL (ref 74–99)
GLUCOSE BLD-MCNC: 176 MG/DL (ref 74–99)
GLUCOSE BLD-MCNC: 253 MG/DL (ref 74–99)
GLUCOSE SERPL-MCNC: 153 MG/DL (ref 74–99)
HBA1C MFR BLD: 8.7 % (ref 4–5.6)
HCT VFR BLD AUTO: 30.9 % (ref 37–54)
HGB BLD-MCNC: 9.7 G/DL (ref 12.5–16.5)
IMM GRANULOCYTES # BLD AUTO: 0.08 K/UL (ref 0–0.58)
IMM GRANULOCYTES NFR BLD: 1 % (ref 0–5)
LYMPHOCYTES NFR BLD: 1.74 K/UL (ref 1.5–4)
LYMPHOCYTES RELATIVE PERCENT: 16 % (ref 20–42)
MCH RBC QN AUTO: 25 PG (ref 26–35)
MCHC RBC AUTO-ENTMCNC: 31.4 G/DL (ref 32–34.5)
MCV RBC AUTO: 79.6 FL (ref 80–99.9)
MONOCYTES NFR BLD: 0.91 K/UL (ref 0.1–0.95)
MONOCYTES NFR BLD: 8 % (ref 2–12)
NEUTROPHILS NFR BLD: 74 % (ref 43–80)
NEUTS SEG NFR BLD: 8.31 K/UL (ref 1.8–7.3)
PLATELET # BLD AUTO: 259 K/UL (ref 130–450)
PMV BLD AUTO: 11.1 FL (ref 7–12)
POTASSIUM SERPL-SCNC: 3.7 MMOL/L (ref 3.5–5)
PROT SERPL-MCNC: 7 G/DL (ref 6.4–8.3)
RBC # BLD AUTO: 3.88 M/UL (ref 3.8–5.8)
SODIUM SERPL-SCNC: 135 MMOL/L (ref 132–146)
WBC OTHER # BLD: 11.2 K/UL (ref 4.5–11.5)

## 2023-09-27 PROCEDURE — 82962 GLUCOSE BLOOD TEST: CPT

## 2023-09-27 PROCEDURE — 1200000000 HC SEMI PRIVATE

## 2023-09-27 PROCEDURE — 6360000002 HC RX W HCPCS: Performed by: NURSE PRACTITIONER

## 2023-09-27 PROCEDURE — 6370000000 HC RX 637 (ALT 250 FOR IP): Performed by: REGISTERED NURSE

## 2023-09-27 PROCEDURE — 85025 COMPLETE CBC W/AUTO DIFF WBC: CPT

## 2023-09-27 PROCEDURE — 83036 HEMOGLOBIN GLYCOSYLATED A1C: CPT

## 2023-09-27 PROCEDURE — 6370000000 HC RX 637 (ALT 250 FOR IP): Performed by: NURSE PRACTITIONER

## 2023-09-27 PROCEDURE — 2580000003 HC RX 258: Performed by: NURSE PRACTITIONER

## 2023-09-27 PROCEDURE — 80053 COMPREHEN METABOLIC PANEL: CPT

## 2023-09-27 PROCEDURE — 6370000000 HC RX 637 (ALT 250 FOR IP): Performed by: INTERNAL MEDICINE

## 2023-09-27 RX ORDER — DOXYCYCLINE HYCLATE 100 MG/1
100 CAPSULE ORAL EVERY 12 HOURS SCHEDULED
Status: DISCONTINUED | OUTPATIENT
Start: 2023-09-27 | End: 2023-09-28 | Stop reason: HOSPADM

## 2023-09-27 RX ORDER — GABAPENTIN 400 MG/1
800 CAPSULE ORAL 4 TIMES DAILY
Status: DISCONTINUED | OUTPATIENT
Start: 2023-09-27 | End: 2023-09-28 | Stop reason: HOSPADM

## 2023-09-27 RX ADMIN — GABAPENTIN 800 MG: 400 CAPSULE ORAL at 21:12

## 2023-09-27 RX ADMIN — METOPROLOL TARTRATE 25 MG: 25 TABLET, FILM COATED ORAL at 21:12

## 2023-09-27 RX ADMIN — OXYCODONE AND ACETAMINOPHEN 1 TABLET: 5; 325 TABLET ORAL at 14:46

## 2023-09-27 RX ADMIN — GABAPENTIN 800 MG: 400 CAPSULE ORAL at 14:44

## 2023-09-27 RX ADMIN — GABAPENTIN 800 MG: 400 CAPSULE ORAL at 16:50

## 2023-09-27 RX ADMIN — CILOSTAZOL 50 MG: 50 TABLET ORAL at 16:50

## 2023-09-27 RX ADMIN — ENOXAPARIN SODIUM 30 MG: 100 INJECTION SUBCUTANEOUS at 21:13

## 2023-09-27 RX ADMIN — DOXYCYCLINE HYCLATE 100 MG: 100 CAPSULE ORAL at 21:12

## 2023-09-27 RX ADMIN — SODIUM CHLORIDE, PRESERVATIVE FREE 10 ML: 5 INJECTION INTRAVENOUS at 21:13

## 2023-09-27 RX ADMIN — METOPROLOL TARTRATE 25 MG: 25 TABLET, FILM COATED ORAL at 08:35

## 2023-09-27 RX ADMIN — CEFEPIME 2000 MG: 2 INJECTION, POWDER, FOR SOLUTION INTRAVENOUS at 02:23

## 2023-09-27 RX ADMIN — DULOXETINE HYDROCHLORIDE 60 MG: 60 CAPSULE, DELAYED RELEASE ORAL at 08:35

## 2023-09-27 RX ADMIN — HYDRALAZINE HYDROCHLORIDE 50 MG: 50 TABLET, FILM COATED ORAL at 08:34

## 2023-09-27 RX ADMIN — SODIUM CHLORIDE, PRESERVATIVE FREE 10 ML: 5 INJECTION INTRAVENOUS at 08:38

## 2023-09-27 RX ADMIN — AMLODIPINE BESYLATE 10 MG: 10 TABLET ORAL at 08:38

## 2023-09-27 RX ADMIN — ENOXAPARIN SODIUM 30 MG: 100 INJECTION SUBCUTANEOUS at 08:38

## 2023-09-27 RX ADMIN — CLONIDINE HYDROCHLORIDE 0.1 MG: 0.1 TABLET ORAL at 14:45

## 2023-09-27 RX ADMIN — CLONIDINE HYDROCHLORIDE 0.1 MG: 0.1 TABLET ORAL at 08:34

## 2023-09-27 RX ADMIN — CILOSTAZOL 50 MG: 50 TABLET ORAL at 06:26

## 2023-09-27 RX ADMIN — OXYCODONE HYDROCHLORIDE 2.5 MG: 5 TABLET ORAL at 14:46

## 2023-09-27 RX ADMIN — MIRTAZAPINE 7.5 MG: 15 TABLET, FILM COATED ORAL at 21:12

## 2023-09-27 RX ADMIN — HYDRALAZINE HYDROCHLORIDE 50 MG: 50 TABLET, FILM COATED ORAL at 21:12

## 2023-09-27 RX ADMIN — INSULIN GLARGINE 10 UNITS: 100 INJECTION, SOLUTION SUBCUTANEOUS at 09:02

## 2023-09-27 RX ADMIN — OXYCODONE HYDROCHLORIDE 10 MG: 10 TABLET, FILM COATED, EXTENDED RELEASE ORAL at 08:35

## 2023-09-27 RX ADMIN — CHLORTHALIDONE 25 MG: 25 TABLET ORAL at 08:34

## 2023-09-27 RX ADMIN — INSULIN GLARGINE 30 UNITS: 100 INJECTION, SOLUTION SUBCUTANEOUS at 21:22

## 2023-09-27 RX ADMIN — GABAPENTIN 600 MG: 300 CAPSULE ORAL at 08:35

## 2023-09-27 RX ADMIN — Medication 2 CAPSULE: at 08:33

## 2023-09-27 RX ADMIN — CLONIDINE HYDROCHLORIDE 0.1 MG: 0.1 TABLET ORAL at 21:12

## 2023-09-27 RX ADMIN — OXYCODONE HYDROCHLORIDE 10 MG: 10 TABLET, FILM COATED, EXTENDED RELEASE ORAL at 21:12

## 2023-09-27 RX ADMIN — PRIMIDONE 50 MG: 50 TABLET ORAL at 21:12

## 2023-09-27 ASSESSMENT — PAIN SCALES - GENERAL: PAINLEVEL_OUTOF10: 0

## 2023-09-27 NOTE — DISCHARGE SUMMARY
Internal Medicine Discharge Summary    NAME: Ronald Malloy :  1955  MRN:  [id number] PCP:Juanito Kline DO    ADMITTED: 2023   DISCHARGED: 2023  9:33 PM    ADMITTING PHYSICIAN: Ranjeet Sweeney DO    PCP: Ina Kline DO    CONSULTANT(S):   IP CONSULT TO HOSPITALIST  IP CONSULT TO PODIATRY  IP CONSULT TO INFECTIOUS DISEASES     ADMITTING DIAGNOSIS:   Wound infection [T14. 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 [Z22.322]      Priority: Medium    Secondary hyperparathyroidism of renal origin ([address]) [N25.81]     Chronic pain syndrome [G89.4]     Above knee amputation of right lower extremity ([address]) [X17.482I]     Major depressive disorder, single episode, unspecified [F32.9]     Muscle weakness (generalized) [M62.81]     Obstructive sleep apnea (adult) (pediatric) [G47.33]     HLD (hyperlipidemia) [E78.5]     PVD (peripheral vascular disease) ([address]) [I73.9]     Stage 3 chronic kidney disease ([address]) [N18.30]     Tobacco dependence [F17.200]     DM II (diabetes mellitus, type II), controlled ([address]) [E11.9]     HTN (hypertension) [I10]     History of vascular surgery [Z98.890]        BRIEF HISTORY OF PRESENT ILLNESS: Ronald Malloy is a 74 y.o. male patient of [address] [facility] who  has a past medical history of 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[address]), Acute kidney injury ([address]), AKA stump complication ([address]), 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 ([address]), Atherosclerosis of native artery of left leg with ulceration of ankle ([address]), 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 ([address]), Cellulitis, scrotum, Coagulopathy ([address]), Critical lower limb ischemia ([address]), Diabetes Insulin Pen Needle  use 1 PEN NEEDLE to inject MEDICATION subcutaneously five times a day     * Insulin Pen Needle 31G X 8 MM Misc  1 each by Does not apply route 5 times daily     Easy Touch Alcohol Prep Medium 70 % Pads  USE TO TEST BLOOD SUGAR 4 TIMES DAILY     Handicap Placard Misc  by Does not apply route Patient cannot walk 200 ft without stopping to rest.    Expiration 10/21/2024     Insulin Syringe-Needle U-100 31G X 5/16\" 0.3 ML Misc  Commonly known as: UltiCare Insulin Syringe  1 each by Other route 5 times daily     OneTouch Delica Plus [id number] Misc  USE TO TEST BLOOD SUGAR 4 TIMES DAILY           * This list has 2 medication(s) that are the same as other medications prescribed for you. Read the directions carefully, and ask your doctor or other care provider to review them with you. ASK your doctor about these medications      amLODIPine 10 MG tablet  Commonly known as: NORVASC  TAKE 1 TABLET BY MOUTH ONCE DAILY     * ammonium lactate 12 % lotion  Commonly known as: LAC-HYDRIN  Apply topically as needed.      * ammonium lactate 12 % lotion  Commonly known as: LAC-HYDRIN  Apply topically twice daily     chlorthalidone 25 MG tablet  Commonly known as: HYGROTON  TAKE 1 TABLET BY MOUTH DAILY     cilostazol 50 MG tablet  Commonly known as: PLETAL  TAKE 1 TABLET BY MOUTH 2 TIMES A DAY     cloNIDine 0.1 MG tablet  Commonly known as: CATAPRES  Take 1 tablet by mouth 3 times daily     doxycycline hyclate 100 MG capsule  Commonly known as: VIBRAMYCIN  Take 1 capsule by mouth 2 times daily     DULoxetine 60 MG extended release capsule  Commonly known as: CYMBALTA  TAKE TWO (2) CAPSULES BY MOUTH EVERY MORNING     gabapentin 800 MG tablet  Commonly known as: NEURONTIN  TAKE 1 TABLET BY MOUTH FOUR TIMES DAILY     hydrALAZINE 50 MG tablet  Commonly known as: APRESOLINE     insulin lispro (1 Unit Dial) 100 UNIT/ML Sopn  Commonly known as: HUMALOG/ADMELOG  CHECK BLOOD SUGAR 3-4 XDAILY & INJECT AS FOLLOWS

## 2023-09-27 NOTE — CARE COORDINATION
Updated plan of care. Patient is from home alone. Patient has a power wheelchair and a walker at home. Patient is established with a PCP Dr. Gudelia Velázquez. Per primary, plan is to transfer to [facility] for vascular consultation. Demos and transport forms in soft chart. Patient will need followed and assisted with discharge planning as necessary.      Electronically signed by Thuan Galvan RN on 9/27/2023 at 9:55 AM

## 2023-09-27 NOTE — PATIENT CARE CONFERENCE
[facility] Quality Flow/Interdisciplinary Rounds Progress Note        Quality Flow Rounds held on September 27, 2023    Disciplines Attending:  Bedside Nurse, , , and Nursing Unit Leadership    Tamir Childs was admitted on 9/25/2023  7:29 PM    Anticipated Discharge Date:       Disposition:    Tarun Score:  Tarun Scale Score: 19    Readmission Risk              Risk of Unplanned Readmission:  15           Discussed patient goal for the day, patient clinical progression, and barriers to discharge.   The following Goal(s) of the Day/Commitment(s) have been identified:  Discharge - Obtain Order- await transfer to [facility]       Gilbert Fraga RN  September 27, 2023

## 2023-09-27 NOTE — PROGRESS NOTES
Podiatry Progress Note  9/27/2023   Ese Feldman       SUBJECTIVE: Patient being seen for follow up of left ankle wound. Discussed with patient plan for transfer to [facility] for vascular consultation. He expressed understanding and states that he's waiting for his MRI to be performed. No acute pedal complaints.     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[address]) 01/16/2020    Acute kidney injury ([address]) 05/18/2020    AKA stump complication ([address]) 43/97/4653    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 ([address]) 05/22/2018    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 ([address]) 05/21/2018    Cellulitis, scrotum 03/20/2020    Coagulopathy ([address]) 05/21/2018    Critical lower limb ischemia ([address]) 03/20/2020    Diabetes mellitus ([address])     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 2 diabetes mellitus, with fat layer exposed ([address]) 05/22/2018    Disruption of closure of muscle or muscle flap, sequela 08/26/2020    DVT, lower extremity ([address])     right leg     Encounter for dental examination and cleaning with abnormal findings 04/02/2018    Gas gangrene of thigh ([address]) 03/20/2020    History of DVT (deep vein thrombosis) 07/31/2018    Hyperglycemia due to type 2 diabetes mellitus ([address]) 03/20/2020    Hyperlipidemia     Hypertension     Ischemic ulcer of right thigh with fat layer exposed ([address])     Ischemic ulcer of right thigh with necrosis of muscle (HCC)     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[address])     Moderate protein-calorie malnutrition ([address]) 05/23/2018    Occlusion of common femoral artery ([address]) 03/20/2020    RAGHU (obstructive sleep apnea)     Osteomyelitis ([address]) 10/24/2018    Osteomyelitis of right lower limb ([address]) 10/24/2018    Pain syndrome, chronic     Postoperative wound infection     PVD (peripheral vascular disease) (HCC)     Tobacco dependence     Vascular occlusion     Vitamin D deficiency     Wound infection 04/16/2020 2,000 mg IntraVENous Q24H Varinder HydeSHONDA - CNP   Stopped at 09/27/23 6789    gabapentin (NEURONTIN) capsule 600 mg  600 mg Oral TID SHONDA Mooney - CNP   600 mg at 09/27/23 0835        Lab Results   Component Value Date    WBC 11.2 09/27/2023    HCT 30.9 (L) 09/27/2023    HGB 9.7 (L) 09/27/2023     09/27/2023     09/27/2023    K 3.7 09/27/2023     09/27/2023    CO2 26 09/27/2023    BUN 24 (H) 09/27/2023    CREATININE 1.9 (H) 09/27/2023    GLUCOSE 153 (H) 09/27/2023    CRP 74.0 (H) 09/25/2023         Radiographs:    ASSESEMENT:  - Full thickness ulceration, infected, left lateral ankle  - Soft tissue edema, left ankle   - Severe PVD, prior hx of right AKA  - Type II DM      PLAN:  - Examined and evaluated  - All labs, imaging, and charts reviewed  - Abx per ID: observe off antibiotics  - Left leg wound cx obtained. Results pending   - Left tib fib xrays and ankle xrays: No acute osseous abnormalities. Irregular wound along distal lateral edge. - Arterial studies(8/14/23): Doppler evidence of severe femoral popliteal occlusive disease. Severe arterial compromise to the left leg below the groin  - Left tib fib MRI ordered. Results pending  - Recommend transfer to [facility] for vascular consultation. Patient has severely limited blood flow to left lower extremity and would benefit from vascular consultation and intervention prior to any Podiatric intervention. Spoke with Dr Anna Sanchez who will be initiating transfer as well as Herb Hermosillo(NP at [facility]) who is willing to evaluate patient upon arrival to [facility]. - Patient at high risk for limb loss due to severe infection and vascular compromise   - Will continue to monitor while in house  - Discussed with Dr Billie Johnson DPM   9/27/2023   10:48 AM       Thank you for involving podiatry in this patient's care.  Please do not hesitate to call with any questions, concerns, or new findings

## 2023-09-27 NOTE — PROGRESS NOTES
Date:2023  Patient Name: Fahad Viera  MRN: [id number]  : 1955  ROOM #: [id number]    Occupational Therapy order received, chart reviewed and evaluation attempted this AM. Patient adamantly declined OT due to L LE pain. Patient informed OT that he is not allowed to put weight through L LE 2/2 wound. RN informed and to get weight bearing clarification from podiatry. Will continue to follow-up.     King Jorge OTR/L #[id number]

## 2023-09-28 ENCOUNTER — HOSPITAL ENCOUNTER (INPATIENT)
Age: 68
LOS: 7 days | Discharge: HOME HEALTH | End: 2023-10-05
Attending: STUDENT IN AN ORGANIZED HEALTH CARE EDUCATION/TRAINING PROGRAM | Admitting: STUDENT IN AN ORGANIZED HEALTH CARE EDUCATION/TRAINING PROGRAM
Payer: MEDICARE

## 2023-09-28 ENCOUNTER — APPOINTMENT (OUTPATIENT)
Dept: MRI IMAGING | Age: 68
DRG: 300 | End: 2023-09-28
Payer: MEDICARE

## 2023-09-28 VITALS
RESPIRATION RATE: 16 BRPM | SYSTOLIC BLOOD PRESSURE: 149 MMHG | HEIGHT: 74 IN | WEIGHT: 289 LBS | TEMPERATURE: 99 F | HEART RATE: 88 BPM | OXYGEN SATURATION: 98 % | BODY MASS INDEX: 37.09 KG/M2 | DIASTOLIC BLOOD PRESSURE: 86 MMHG

## 2023-09-28 PROBLEM — S91.332A PENETRATING FOOT WOUND, LEFT, INITIAL ENCOUNTER: Status: ACTIVE | Noted: 2023-09-28

## 2023-09-28 LAB
ALBUMIN SERPL-MCNC: 2.9 G/DL (ref 3.5–5.2)
ALP SERPL-CCNC: 118 U/L (ref 40–129)
ALT SERPL-CCNC: 37 U/L (ref 0–40)
ANION GAP SERPL CALCULATED.3IONS-SCNC: 10 MMOL/L (ref 7–16)
AST SERPL-CCNC: 30 U/L (ref 0–39)
BASOPHILS # BLD: 0.03 K/UL (ref 0–0.2)
BASOPHILS NFR BLD: 0 % (ref 0–2)
BILIRUB SERPL-MCNC: 0.4 MG/DL (ref 0–1.2)
BUN SERPL-MCNC: 25 MG/DL (ref 6–23)
CALCIUM SERPL-MCNC: 9.1 MG/DL (ref 8.6–10.2)
CHLORIDE SERPL-SCNC: 99 MMOL/L (ref 98–107)
CO2 SERPL-SCNC: 26 MMOL/L (ref 22–29)
CREAT SERPL-MCNC: 1.9 MG/DL (ref 0.7–1.2)
EOSINOPHIL # BLD: 0.14 K/UL (ref 0.05–0.5)
EOSINOPHILS RELATIVE PERCENT: 1 % (ref 0–6)
ERYTHROCYTE [DISTWIDTH] IN BLOOD BY AUTOMATED COUNT: 14 % (ref 11.5–15)
GFR SERPL CREATININE-BSD FRML MDRD: 39 ML/MIN/1.73M2
GLUCOSE BLD-MCNC: 142 MG/DL (ref 74–99)
GLUCOSE BLD-MCNC: 194 MG/DL (ref 74–99)
GLUCOSE BLD-MCNC: 218 MG/DL (ref 74–99)
GLUCOSE BLD-MCNC: 222 MG/DL (ref 74–99)
GLUCOSE SERPL-MCNC: 128 MG/DL (ref 74–99)
HCT VFR BLD AUTO: 31 % (ref 37–54)
HGB BLD-MCNC: 9.9 G/DL (ref 12.5–16.5)
IMM GRANULOCYTES # BLD AUTO: 0.05 K/UL (ref 0–0.58)
IMM GRANULOCYTES NFR BLD: 1 % (ref 0–5)
LYMPHOCYTES NFR BLD: 1.45 K/UL (ref 1.5–4)
LYMPHOCYTES RELATIVE PERCENT: 15 % (ref 20–42)
MCH RBC QN AUTO: 25.3 PG (ref 26–35)
MCHC RBC AUTO-ENTMCNC: 31.9 G/DL (ref 32–34.5)
MCV RBC AUTO: 79.1 FL (ref 80–99.9)
MONOCYTES NFR BLD: 0.77 K/UL (ref 0.1–0.95)
MONOCYTES NFR BLD: 8 % (ref 2–12)
NEUTROPHILS NFR BLD: 75 % (ref 43–80)
NEUTS SEG NFR BLD: 7.44 K/UL (ref 1.8–7.3)
PLATELET # BLD AUTO: 255 K/UL (ref 130–450)
PMV BLD AUTO: 10.5 FL (ref 7–12)
POTASSIUM SERPL-SCNC: 3.9 MMOL/L (ref 3.5–5)
PROT SERPL-MCNC: 7.2 G/DL (ref 6.4–8.3)
RBC # BLD AUTO: 3.92 M/UL (ref 3.8–5.8)
SODIUM SERPL-SCNC: 135 MMOL/L (ref 132–146)
WBC OTHER # BLD: 9.9 K/UL (ref 4.5–11.5)

## 2023-09-28 PROCEDURE — 80053 COMPREHEN METABOLIC PANEL: CPT

## 2023-09-28 PROCEDURE — 1200000000 HC SEMI PRIVATE

## 2023-09-28 PROCEDURE — 6370000000 HC RX 637 (ALT 250 FOR IP): Performed by: NURSE PRACTITIONER

## 2023-09-28 PROCEDURE — 82962 GLUCOSE BLOOD TEST: CPT

## 2023-09-28 PROCEDURE — 85025 COMPLETE CBC W/AUTO DIFF WBC: CPT

## 2023-09-28 PROCEDURE — 73718 MRI LOWER EXTREMITY W/O DYE: CPT

## 2023-09-28 PROCEDURE — 6370000000 HC RX 637 (ALT 250 FOR IP): Performed by: REGISTERED NURSE

## 2023-09-28 PROCEDURE — 6360000002 HC RX W HCPCS

## 2023-09-28 PROCEDURE — 2580000003 HC RX 258: Performed by: NURSE PRACTITIONER

## 2023-09-28 PROCEDURE — 6370000000 HC RX 637 (ALT 250 FOR IP): Performed by: INTERNAL MEDICINE

## 2023-09-28 PROCEDURE — 6360000002 HC RX W HCPCS: Performed by: NURSE PRACTITIONER

## 2023-09-28 RX ORDER — INSULIN LISPRO 100 [IU]/ML
0-8 INJECTION, SOLUTION INTRAVENOUS; SUBCUTANEOUS
Status: DISCONTINUED | OUTPATIENT
Start: 2023-09-29 | End: 2023-10-06 | Stop reason: HOSPADM

## 2023-09-28 RX ORDER — PRIMIDONE 50 MG/1
50 TABLET ORAL NIGHTLY
Status: DISCONTINUED | OUTPATIENT
Start: 2023-09-29 | End: 2023-10-06 | Stop reason: HOSPADM

## 2023-09-28 RX ORDER — LANOLIN ALCOHOL/MO/W.PET/CERES
3 CREAM (GRAM) TOPICAL NIGHTLY PRN
Status: DISCONTINUED | OUTPATIENT
Start: 2023-09-28 | End: 2023-10-06 | Stop reason: HOSPADM

## 2023-09-28 RX ORDER — MIRTAZAPINE 15 MG/1
7.5 TABLET, FILM COATED ORAL NIGHTLY
Status: DISCONTINUED | OUTPATIENT
Start: 2023-09-29 | End: 2023-10-06 | Stop reason: HOSPADM

## 2023-09-28 RX ORDER — POTASSIUM CHLORIDE 20 MEQ/1
40 TABLET, EXTENDED RELEASE ORAL PRN
Status: DISCONTINUED | OUTPATIENT
Start: 2023-09-28 | End: 2023-10-06 | Stop reason: HOSPADM

## 2023-09-28 RX ORDER — INSULIN GLARGINE 100 [IU]/ML
30 INJECTION, SOLUTION SUBCUTANEOUS NIGHTLY
Status: DISCONTINUED | OUTPATIENT
Start: 2023-09-29 | End: 2023-10-06 | Stop reason: HOSPADM

## 2023-09-28 RX ORDER — DULOXETIN HYDROCHLORIDE 60 MG/1
60 CAPSULE, DELAYED RELEASE ORAL DAILY
Status: DISCONTINUED | OUTPATIENT
Start: 2023-09-29 | End: 2023-10-06 | Stop reason: HOSPADM

## 2023-09-28 RX ORDER — ONDANSETRON 4 MG/1
4 TABLET, ORALLY DISINTEGRATING ORAL EVERY 8 HOURS PRN
Status: DISCONTINUED | OUTPATIENT
Start: 2023-09-28 | End: 2023-10-06 | Stop reason: HOSPADM

## 2023-09-28 RX ORDER — CILOSTAZOL 50 MG/1
50 TABLET ORAL
Status: DISCONTINUED | OUTPATIENT
Start: 2023-09-29 | End: 2023-10-06 | Stop reason: HOSPADM

## 2023-09-28 RX ORDER — HYDRALAZINE HYDROCHLORIDE 50 MG/1
50 TABLET, FILM COATED ORAL 2 TIMES DAILY
Status: DISCONTINUED | OUTPATIENT
Start: 2023-09-29 | End: 2023-10-04

## 2023-09-28 RX ORDER — SODIUM CHLORIDE 0.9 % (FLUSH) 0.9 %
10 SYRINGE (ML) INJECTION PRN
Status: DISCONTINUED | OUTPATIENT
Start: 2023-09-28 | End: 2023-10-06 | Stop reason: HOSPADM

## 2023-09-28 RX ORDER — OXYCODONE HCL 10 MG/1
10 TABLET, FILM COATED, EXTENDED RELEASE ORAL EVERY 12 HOURS
Status: DISCONTINUED | OUTPATIENT
Start: 2023-09-29 | End: 2023-10-06 | Stop reason: HOSPADM

## 2023-09-28 RX ORDER — INSULIN GLARGINE 100 [IU]/ML
10 INJECTION, SOLUTION SUBCUTANEOUS EVERY MORNING
Status: DISCONTINUED | OUTPATIENT
Start: 2023-09-29 | End: 2023-10-06 | Stop reason: HOSPADM

## 2023-09-28 RX ORDER — POTASSIUM CHLORIDE 7.45 MG/ML
10 INJECTION INTRAVENOUS PRN
Status: DISCONTINUED | OUTPATIENT
Start: 2023-09-28 | End: 2023-10-06 | Stop reason: HOSPADM

## 2023-09-28 RX ORDER — DEXTROSE MONOHYDRATE 100 MG/ML
INJECTION, SOLUTION INTRAVENOUS CONTINUOUS PRN
Status: DISCONTINUED | OUTPATIENT
Start: 2023-09-28 | End: 2023-10-06 | Stop reason: HOSPADM

## 2023-09-28 RX ORDER — MAGNESIUM SULFATE IN WATER 40 MG/ML
2000 INJECTION, SOLUTION INTRAVENOUS PRN
Status: DISCONTINUED | OUTPATIENT
Start: 2023-09-28 | End: 2023-10-06 | Stop reason: HOSPADM

## 2023-09-28 RX ORDER — ONDANSETRON 2 MG/ML
4 INJECTION INTRAMUSCULAR; INTRAVENOUS EVERY 6 HOURS PRN
Status: DISCONTINUED | OUTPATIENT
Start: 2023-09-28 | End: 2023-10-06 | Stop reason: HOSPADM

## 2023-09-28 RX ORDER — CLONIDINE HYDROCHLORIDE 0.1 MG/1
0.1 TABLET ORAL 3 TIMES DAILY
Status: DISCONTINUED | OUTPATIENT
Start: 2023-09-29 | End: 2023-10-06 | Stop reason: HOSPADM

## 2023-09-28 RX ORDER — ACETAMINOPHEN 325 MG/1
650 TABLET ORAL EVERY 6 HOURS PRN
Status: DISCONTINUED | OUTPATIENT
Start: 2023-09-28 | End: 2023-10-06 | Stop reason: HOSPADM

## 2023-09-28 RX ORDER — SENNOSIDES A AND B 8.6 MG/1
1 TABLET, FILM COATED ORAL DAILY PRN
Status: DISCONTINUED | OUTPATIENT
Start: 2023-09-28 | End: 2023-10-06 | Stop reason: HOSPADM

## 2023-09-28 RX ORDER — CHLORTHALIDONE 25 MG/1
25 TABLET ORAL DAILY
Status: DISCONTINUED | OUTPATIENT
Start: 2023-09-29 | End: 2023-10-06 | Stop reason: HOSPADM

## 2023-09-28 RX ORDER — LACTOBACILLUS RHAMNOSUS GG 10B CELL
2 CAPSULE ORAL DAILY
Status: DISCONTINUED | OUTPATIENT
Start: 2023-09-29 | End: 2023-10-06 | Stop reason: HOSPADM

## 2023-09-28 RX ORDER — INSULIN LISPRO 100 [IU]/ML
0-4 INJECTION, SOLUTION INTRAVENOUS; SUBCUTANEOUS NIGHTLY
Status: DISCONTINUED | OUTPATIENT
Start: 2023-09-29 | End: 2023-10-06 | Stop reason: HOSPADM

## 2023-09-28 RX ORDER — AMLODIPINE BESYLATE 10 MG/1
10 TABLET ORAL DAILY
Status: DISCONTINUED | OUTPATIENT
Start: 2023-09-29 | End: 2023-10-01

## 2023-09-28 RX ORDER — SODIUM CHLORIDE 0.9 % (FLUSH) 0.9 %
10 SYRINGE (ML) INJECTION EVERY 12 HOURS SCHEDULED
Status: DISCONTINUED | OUTPATIENT
Start: 2023-09-29 | End: 2023-10-06 | Stop reason: HOSPADM

## 2023-09-28 RX ORDER — ENOXAPARIN SODIUM 100 MG/ML
30 INJECTION SUBCUTANEOUS 2 TIMES DAILY
Status: DISCONTINUED | OUTPATIENT
Start: 2023-09-29 | End: 2023-10-06 | Stop reason: HOSPADM

## 2023-09-28 RX ORDER — ACETAMINOPHEN 650 MG/1
650 SUPPOSITORY RECTAL EVERY 6 HOURS PRN
Status: DISCONTINUED | OUTPATIENT
Start: 2023-09-28 | End: 2023-10-06 | Stop reason: HOSPADM

## 2023-09-28 RX ORDER — SODIUM CHLORIDE 9 MG/ML
INJECTION, SOLUTION INTRAVENOUS PRN
Status: DISCONTINUED | OUTPATIENT
Start: 2023-09-28 | End: 2023-10-03 | Stop reason: SDUPTHER

## 2023-09-28 RX ADMIN — METOPROLOL TARTRATE 25 MG: 25 TABLET, FILM COATED ORAL at 08:04

## 2023-09-28 RX ADMIN — DULOXETINE HYDROCHLORIDE 60 MG: 60 CAPSULE, DELAYED RELEASE ORAL at 08:03

## 2023-09-28 RX ADMIN — HYDRALAZINE HYDROCHLORIDE 50 MG: 50 TABLET, FILM COATED ORAL at 08:04

## 2023-09-28 RX ADMIN — MIRTAZAPINE 7.5 MG: 15 TABLET, FILM COATED ORAL at 20:43

## 2023-09-28 RX ADMIN — PRIMIDONE 50 MG: 50 TABLET ORAL at 20:42

## 2023-09-28 RX ADMIN — INSULIN GLARGINE 10 UNITS: 100 INJECTION, SOLUTION SUBCUTANEOUS at 08:04

## 2023-09-28 RX ADMIN — INSULIN LISPRO 2 UNITS: 100 INJECTION, SOLUTION INTRAVENOUS; SUBCUTANEOUS at 12:56

## 2023-09-28 RX ADMIN — GABAPENTIN 800 MG: 400 CAPSULE ORAL at 16:19

## 2023-09-28 RX ADMIN — OXYCODONE HYDROCHLORIDE 2.5 MG: 5 TABLET ORAL at 15:39

## 2023-09-28 RX ADMIN — CILOSTAZOL 50 MG: 50 TABLET ORAL at 06:31

## 2023-09-28 RX ADMIN — DOXYCYCLINE HYCLATE 100 MG: 100 CAPSULE ORAL at 08:04

## 2023-09-28 RX ADMIN — HYDRALAZINE HYDROCHLORIDE 50 MG: 50 TABLET, FILM COATED ORAL at 20:43

## 2023-09-28 RX ADMIN — Medication 0.25 MG: at 10:59

## 2023-09-28 RX ADMIN — OXYCODONE HYDROCHLORIDE 10 MG: 10 TABLET, FILM COATED, EXTENDED RELEASE ORAL at 09:09

## 2023-09-28 RX ADMIN — CHLORTHALIDONE 25 MG: 25 TABLET ORAL at 08:04

## 2023-09-28 RX ADMIN — CLONIDINE HYDROCHLORIDE 0.1 MG: 0.1 TABLET ORAL at 20:43

## 2023-09-28 RX ADMIN — GABAPENTIN 800 MG: 400 CAPSULE ORAL at 20:43

## 2023-09-28 RX ADMIN — OXYCODONE AND ACETAMINOPHEN 1 TABLET: 5; 325 TABLET ORAL at 02:26

## 2023-09-28 RX ADMIN — METOPROLOL TARTRATE 25 MG: 25 TABLET, FILM COATED ORAL at 20:43

## 2023-09-28 RX ADMIN — DOXYCYCLINE HYCLATE 100 MG: 100 CAPSULE ORAL at 20:43

## 2023-09-28 RX ADMIN — ENOXAPARIN SODIUM 30 MG: 100 INJECTION SUBCUTANEOUS at 08:05

## 2023-09-28 RX ADMIN — HYDROMORPHONE HYDROCHLORIDE 0.25 MG: 0.5 INJECTION, SOLUTION INTRAMUSCULAR; INTRAVENOUS; SUBCUTANEOUS at 10:59

## 2023-09-28 RX ADMIN — CILOSTAZOL 50 MG: 50 TABLET ORAL at 16:19

## 2023-09-28 RX ADMIN — SODIUM CHLORIDE, PRESERVATIVE FREE 10 ML: 5 INJECTION INTRAVENOUS at 20:48

## 2023-09-28 RX ADMIN — ENOXAPARIN SODIUM 30 MG: 100 INJECTION SUBCUTANEOUS at 20:43

## 2023-09-28 RX ADMIN — Medication 2 CAPSULE: at 08:03

## 2023-09-28 RX ADMIN — CLONIDINE HYDROCHLORIDE 0.1 MG: 0.1 TABLET ORAL at 16:19

## 2023-09-28 RX ADMIN — INSULIN GLARGINE 30 UNITS: 100 INJECTION, SOLUTION SUBCUTANEOUS at 20:44

## 2023-09-28 RX ADMIN — OXYCODONE HYDROCHLORIDE 10 MG: 10 TABLET, FILM COATED, EXTENDED RELEASE ORAL at 20:43

## 2023-09-28 RX ADMIN — GABAPENTIN 800 MG: 400 CAPSULE ORAL at 12:56

## 2023-09-28 RX ADMIN — OXYCODONE HYDROCHLORIDE 2.5 MG: 5 TABLET ORAL at 02:26

## 2023-09-28 RX ADMIN — AMLODIPINE BESYLATE 10 MG: 10 TABLET ORAL at 08:04

## 2023-09-28 RX ADMIN — GABAPENTIN 800 MG: 400 CAPSULE ORAL at 08:03

## 2023-09-28 RX ADMIN — CLONIDINE HYDROCHLORIDE 0.1 MG: 0.1 TABLET ORAL at 08:03

## 2023-09-28 RX ADMIN — OXYCODONE AND ACETAMINOPHEN 1 TABLET: 5; 325 TABLET ORAL at 15:39

## 2023-09-28 ASSESSMENT — PAIN SCALES - GENERAL
PAINLEVEL_OUTOF10: 10
PAINLEVEL_OUTOF10: 10

## 2023-09-28 ASSESSMENT — PAIN DESCRIPTION - DESCRIPTORS
DESCRIPTORS: ACHING;DISCOMFORT
DESCRIPTORS: BURNING;ACHING;SHARP

## 2023-09-28 ASSESSMENT — PAIN DESCRIPTION - ORIENTATION
ORIENTATION: LEFT
ORIENTATION: RIGHT

## 2023-09-28 ASSESSMENT — PAIN DESCRIPTION - LOCATION
LOCATION: HIP
LOCATION: FOOT;TOE (COMMENT WHICH ONE)

## 2023-09-28 NOTE — PROGRESS NOTES
Nurse to nurse called to [facility] ([phone]) for transfer to [phone]. Transport set up with [facility] for 2130.

## 2023-09-28 NOTE — PATIENT CARE CONFERENCE
[facility] Quality Flow/Interdisciplinary Rounds Progress Note        Quality Flow Rounds held on September 28, 2023    Disciplines Attending:  Bedside Nurse, , , and Nursing Unit Leadership    Juan J Santana was admitted on 9/25/2023  7:29 PM    Anticipated Discharge Date:       Disposition:    Tarun Score:  Tarun Scale Score: 19    Readmission Risk              Risk of Unplanned Readmission:  15           Discussed patient goal for the day, patient clinical progression, and barriers to discharge.   The following Goal(s) of the Day/Commitment(s) have been identified:  Discharge - Obtain Order Await MRI and transfer to [facility], RN  September 28, 2023

## 2023-09-28 NOTE — PROGRESS NOTES
Access Center called and stated that [facility] ambulance would be here to get pt in 3 hrs. Bedside RN notified and charge nurse notified.  Electronically signed by Eliza Billings RN on 9/28/2023 at 6:33 PM

## 2023-09-28 NOTE — PROGRESS NOTES
Received call from access center that patient does have bed assignment for [facility]. Room 5402 B. Electronically signed by Celso Urbina RN on 9/28/2023 at 6:20 PM    Access center to arrange transport and call unit back with ETA.     Electronically signed by Celso Urbina RN on 9/28/2023 at 6:21 PM

## 2023-09-28 NOTE — PLAN OF CARE
Problem: Pain  Goal: Verbalizes/displays adequate comfort level or baseline comfort level  Outcome: Progressing     Problem: Skin/Tissue Integrity  Goal: Absence of new skin breakdown  Description: 1. Monitor for areas of redness and/or skin breakdown  2. Assess vascular access sites hourly  3. Every 4-6 hours minimum:  Change oxygen saturation probe site  4. Every 4-6 hours:  If on nasal continuous positive airway pressure, respiratory therapy assess nares and determine need for appliance change or resting period.   Outcome: Progressing     Problem: Safety - Adult  Goal: Free from fall injury  Outcome: Progressing  Flowsheets (Taken 9/27/2023 1543 by Rosi Essex, RN)  Free From Fall Injury: Instruct family/caregiver on patient safety     Problem: Chronic Conditions and Co-morbidities  Goal: Patient's chronic conditions and co-morbidity symptoms are monitored and maintained or improved  Outcome: Progressing

## 2023-09-28 NOTE — PROGRESS NOTES
Patient has personal motorized wheelchair in room. Spoke to [facility]i at hospitals, who stated wheelchair will not fit in ambulance that has been arranged for transport this evening, but will fit in wheelchair van. Unfortunately wheelchair Rosario Nicolegerardo is not available this time of evening. Randi arranged for wheelchair Rosario Whitney to  equipment tomorrow am at 1030 to transport to patient's new room in [address]. Patient is agreeable to this plan.     Electronically signed by Althea Wang RN on 9/28/2023 at 7:23 PM

## 2023-09-28 NOTE — PROGRESS NOTES
Date:2023  Patient Name: Audra Sheriff  MRN: [id number]  : 1955  ROOM #: [id number]    Occupational Therapy order received, chart reviewed and evaluation attempted this date. Patient adamantly declined OT due to L LE pain. Will continue to follow-up.      Blanca Ornelas OTR/L #[id number]

## 2023-09-29 PROBLEM — I70.243 ATHEROSCLEROSIS OF NATIVE ARTERY OF LEFT LEG WITH ULCERATION OF ANKLE (HCC): Chronic | Status: ACTIVE | Noted: 2023-09-29

## 2023-09-29 LAB
ALBUMIN SERPL-MCNC: 2.9 G/DL (ref 3.5–5.2)
ALP SERPL-CCNC: 123 U/L (ref 40–129)
ALT SERPL-CCNC: 39 U/L (ref 0–40)
ANION GAP SERPL CALCULATED.3IONS-SCNC: 9 MMOL/L (ref 7–16)
AST SERPL-CCNC: 30 U/L (ref 0–39)
BILIRUB SERPL-MCNC: 0.4 MG/DL (ref 0–1.2)
BUN SERPL-MCNC: 26 MG/DL (ref 6–23)
CALCIUM SERPL-MCNC: 8.8 MG/DL (ref 8.6–10.2)
CHLORIDE SERPL-SCNC: 99 MMOL/L (ref 98–107)
CO2 SERPL-SCNC: 27 MMOL/L (ref 22–29)
CREAT SERPL-MCNC: 2.1 MG/DL (ref 0.7–1.2)
GFR SERPL CREATININE-BSD FRML MDRD: 35 ML/MIN/1.73M2
GLUCOSE BLD-MCNC: 123 MG/DL (ref 74–99)
GLUCOSE BLD-MCNC: 147 MG/DL (ref 74–99)
GLUCOSE BLD-MCNC: 168 MG/DL (ref 74–99)
GLUCOSE SERPL-MCNC: 95 MG/DL (ref 74–99)
MICROORGANISM SPEC CULT: ABNORMAL
MICROORGANISM/AGENT SPEC: ABNORMAL
POTASSIUM SERPL-SCNC: 4.1 MMOL/L (ref 3.5–5)
PROT SERPL-MCNC: 7.2 G/DL (ref 6.4–8.3)
SODIUM SERPL-SCNC: 135 MMOL/L (ref 132–146)
SPECIMEN DESCRIPTION: ABNORMAL
SPECIMEN DESCRIPTION: ABNORMAL

## 2023-09-29 PROCEDURE — 6370000000 HC RX 637 (ALT 250 FOR IP)

## 2023-09-29 PROCEDURE — 2580000003 HC RX 258: Performed by: SURGERY

## 2023-09-29 PROCEDURE — 2580000003 HC RX 258: Performed by: INTERNAL MEDICINE

## 2023-09-29 PROCEDURE — B41G1ZZ FLUOROSCOPY OF LEFT LOWER EXTREMITY ARTERIES USING LOW OSMOLAR CONTRAST: ICD-10-PCS | Performed by: SURGERY

## 2023-09-29 PROCEDURE — 75710 ARTERY X-RAYS ARM/LEG: CPT

## 2023-09-29 PROCEDURE — 82962 GLUCOSE BLOOD TEST: CPT

## 2023-09-29 PROCEDURE — 99223 1ST HOSP IP/OBS HIGH 75: CPT | Performed by: SURGERY

## 2023-09-29 PROCEDURE — 6360000002 HC RX W HCPCS

## 2023-09-29 PROCEDURE — 75710 ARTERY X-RAYS ARM/LEG: CPT | Performed by: SURGERY

## 2023-09-29 PROCEDURE — 37224 PR REVSC OPN/PRG FEM/POP W/ANGIOPLASTY UNI: CPT | Performed by: SURGERY

## 2023-09-29 PROCEDURE — 6370000000 HC RX 637 (ALT 250 FOR IP): Performed by: FAMILY MEDICINE

## 2023-09-29 PROCEDURE — 2709999900 HC NON-CHARGEABLE SUPPLY

## 2023-09-29 PROCEDURE — 6360000002 HC RX W HCPCS: Performed by: INTERNAL MEDICINE

## 2023-09-29 PROCEDURE — 1200000000 HC SEMI PRIVATE

## 2023-09-29 PROCEDURE — 37228 PR REVSC OPN/PRQ TIB/PERO W/ANGIOPLASTY UNI: CPT | Performed by: SURGERY

## 2023-09-29 PROCEDURE — C1894 INTRO/SHEATH, NON-LASER: HCPCS

## 2023-09-29 PROCEDURE — C1887 CATHETER, GUIDING: HCPCS

## 2023-09-29 PROCEDURE — 6370000000 HC RX 637 (ALT 250 FOR IP): Performed by: STUDENT IN AN ORGANIZED HEALTH CARE EDUCATION/TRAINING PROGRAM

## 2023-09-29 PROCEDURE — 047N3Z1 DILATION OF LEFT POPLITEAL ARTERY USING DRUG-COATED BALLOON, PERCUTANEOUS APPROACH: ICD-10-PCS | Performed by: SURGERY

## 2023-09-29 PROCEDURE — 2580000003 HC RX 258

## 2023-09-29 PROCEDURE — C1725 CATH, TRANSLUMIN NON-LASER: HCPCS

## 2023-09-29 PROCEDURE — 6360000002 HC RX W HCPCS: Performed by: STUDENT IN AN ORGANIZED HEALTH CARE EDUCATION/TRAINING PROGRAM

## 2023-09-29 PROCEDURE — 75774 ARTERY X-RAY EACH VESSEL: CPT

## 2023-09-29 PROCEDURE — 80053 COMPREHEN METABOLIC PANEL: CPT

## 2023-09-29 PROCEDURE — 2500000003 HC RX 250 WO HCPCS

## 2023-09-29 PROCEDURE — 37228 HC TIB PER TERRITORY PLASTY: CPT

## 2023-09-29 PROCEDURE — 047S3Z1 DILATION OF LEFT POSTERIOR TIBIAL ARTERY USING DRUG-COATED BALLOON, PERCUTANEOUS APPROACH: ICD-10-PCS | Performed by: SURGERY

## 2023-09-29 PROCEDURE — C1769 GUIDE WIRE: HCPCS

## 2023-09-29 PROCEDURE — 36415 COLL VENOUS BLD VENIPUNCTURE: CPT

## 2023-09-29 PROCEDURE — 37224 HC FEM POP TERRITORY PLASTY: CPT

## 2023-09-29 RX ORDER — ATORVASTATIN CALCIUM 40 MG/1
40 TABLET, FILM COATED ORAL NIGHTLY
Status: DISCONTINUED | OUTPATIENT
Start: 2023-09-29 | End: 2023-10-06 | Stop reason: HOSPADM

## 2023-09-29 RX ORDER — CLOPIDOGREL BISULFATE 75 MG/1
75 TABLET ORAL DAILY
Status: DISCONTINUED | OUTPATIENT
Start: 2023-09-30 | End: 2023-10-06 | Stop reason: HOSPADM

## 2023-09-29 RX ORDER — OXYCODONE HYDROCHLORIDE AND ACETAMINOPHEN 5; 325 MG/1; MG/1
1 TABLET ORAL EVERY 4 HOURS PRN
Status: DISCONTINUED | OUTPATIENT
Start: 2023-09-29 | End: 2023-10-06 | Stop reason: HOSPADM

## 2023-09-29 RX ORDER — MORPHINE SULFATE 2 MG/ML
2 INJECTION, SOLUTION INTRAMUSCULAR; INTRAVENOUS
Status: DISCONTINUED | OUTPATIENT
Start: 2023-09-29 | End: 2023-10-06 | Stop reason: HOSPADM

## 2023-09-29 RX ORDER — DOXYCYCLINE HYCLATE 100 MG/1
100 CAPSULE ORAL 2 TIMES DAILY
Status: DISCONTINUED | OUTPATIENT
Start: 2023-09-29 | End: 2023-10-06 | Stop reason: HOSPADM

## 2023-09-29 RX ORDER — SODIUM CHLORIDE 9 MG/ML
INJECTION, SOLUTION INTRAVENOUS CONTINUOUS
Status: DISCONTINUED | OUTPATIENT
Start: 2023-09-29 | End: 2023-10-02

## 2023-09-29 RX ORDER — NALOXONE HYDROCHLORIDE 0.4 MG/ML
0.4 INJECTION, SOLUTION INTRAMUSCULAR; INTRAVENOUS; SUBCUTANEOUS PRN
Status: DISCONTINUED | OUTPATIENT
Start: 2023-09-29 | End: 2023-10-06 | Stop reason: HOSPADM

## 2023-09-29 RX ADMIN — Medication 2 CAPSULE: at 12:12

## 2023-09-29 RX ADMIN — CHLORTHALIDONE 25 MG: 25 TABLET ORAL at 12:16

## 2023-09-29 RX ADMIN — PRIMIDONE 50 MG: 50 TABLET ORAL at 20:31

## 2023-09-29 RX ADMIN — AMLODIPINE BESYLATE 10 MG: 10 TABLET ORAL at 12:12

## 2023-09-29 RX ADMIN — DOXYCYCLINE HYCLATE 100 MG: 100 CAPSULE ORAL at 12:12

## 2023-09-29 RX ADMIN — ENOXAPARIN SODIUM 30 MG: 100 INJECTION SUBCUTANEOUS at 20:29

## 2023-09-29 RX ADMIN — SODIUM CHLORIDE: 9 INJECTION, SOLUTION INTRAVENOUS at 08:35

## 2023-09-29 RX ADMIN — HYDRALAZINE HYDROCHLORIDE 50 MG: 50 TABLET, FILM COATED ORAL at 12:12

## 2023-09-29 RX ADMIN — ATORVASTATIN CALCIUM 40 MG: 40 TABLET, FILM COATED ORAL at 20:29

## 2023-09-29 RX ADMIN — DULOXETINE HYDROCHLORIDE 60 MG: 60 CAPSULE, DELAYED RELEASE ORAL at 12:15

## 2023-09-29 RX ADMIN — PIPERACILLIN AND TAZOBACTAM 3375 MG: 3; .375 INJECTION, POWDER, LYOPHILIZED, FOR SOLUTION INTRAVENOUS at 16:55

## 2023-09-29 RX ADMIN — OXYCODONE AND ACETAMINOPHEN 1 TABLET: 325; 5 TABLET ORAL at 01:41

## 2023-09-29 RX ADMIN — OXYCODONE HYDROCHLORIDE 10 MG: 10 TABLET, FILM COATED, EXTENDED RELEASE ORAL at 20:30

## 2023-09-29 RX ADMIN — OXYCODONE AND ACETAMINOPHEN 1 TABLET: 325; 5 TABLET ORAL at 06:13

## 2023-09-29 RX ADMIN — MORPHINE SULFATE 2 MG: 2 INJECTION, SOLUTION INTRAMUSCULAR; INTRAVENOUS at 14:26

## 2023-09-29 RX ADMIN — CILOSTAZOL 50 MG: 50 TABLET ORAL at 12:15

## 2023-09-29 RX ADMIN — CILOSTAZOL 50 MG: 50 TABLET ORAL at 16:54

## 2023-09-29 RX ADMIN — METOPROLOL TARTRATE 25 MG: 25 TABLET, FILM COATED ORAL at 20:31

## 2023-09-29 RX ADMIN — CLONIDINE HYDROCHLORIDE 0.1 MG: 0.1 TABLET ORAL at 20:31

## 2023-09-29 RX ADMIN — OXYCODONE HYDROCHLORIDE 10 MG: 10 TABLET, FILM COATED, EXTENDED RELEASE ORAL at 12:12

## 2023-09-29 RX ADMIN — INSULIN GLARGINE 30 UNITS: 100 INJECTION, SOLUTION SUBCUTANEOUS at 20:29

## 2023-09-29 RX ADMIN — SODIUM CHLORIDE, PRESERVATIVE FREE 10 ML: 5 INJECTION INTRAVENOUS at 12:17

## 2023-09-29 RX ADMIN — CLONIDINE HYDROCHLORIDE 0.1 MG: 0.1 TABLET ORAL at 14:26

## 2023-09-29 RX ADMIN — HYDRALAZINE HYDROCHLORIDE 50 MG: 50 TABLET, FILM COATED ORAL at 20:30

## 2023-09-29 RX ADMIN — MIRTAZAPINE 7.5 MG: 15 TABLET, FILM COATED ORAL at 20:31

## 2023-09-29 RX ADMIN — CLONIDINE HYDROCHLORIDE 0.1 MG: 0.1 TABLET ORAL at 12:12

## 2023-09-29 RX ADMIN — DOXYCYCLINE HYCLATE 100 MG: 100 CAPSULE ORAL at 20:30

## 2023-09-29 ASSESSMENT — PAIN - FUNCTIONAL ASSESSMENT

## 2023-09-29 ASSESSMENT — PAIN DESCRIPTION - DESCRIPTORS
DESCRIPTORS: ACHING;POUNDING;THROBBING
DESCRIPTORS: ACHING;DISCOMFORT;THROBBING
DESCRIPTORS: ACHING;THROBBING;STABBING
DESCRIPTORS: BURNING;SHARP;STABBING
DESCRIPTORS: BURNING;SHARP;STABBING

## 2023-09-29 ASSESSMENT — PAIN DESCRIPTION - ORIENTATION
ORIENTATION: LEFT

## 2023-09-29 ASSESSMENT — PAIN DESCRIPTION - LOCATION
LOCATION: LEG;FOOT
LOCATION: FOOT
LOCATION: LEG
LOCATION: FOOT
LOCATION: FOOT

## 2023-09-29 ASSESSMENT — PAIN SCALES - GENERAL
PAINLEVEL_OUTOF10: 10
PAINLEVEL_OUTOF10: 9
PAINLEVEL_OUTOF10: 7
PAINLEVEL_OUTOF10: 6
PAINLEVEL_OUTOF10: 8
PAINLEVEL_OUTOF10: 10
PAINLEVEL_OUTOF10: 7

## 2023-09-29 ASSESSMENT — PAIN DESCRIPTION - FREQUENCY: FREQUENCY: CONTINUOUS

## 2023-09-29 ASSESSMENT — PAIN DESCRIPTION - PAIN TYPE: TYPE: ACUTE PAIN

## 2023-09-29 ASSESSMENT — PAIN DESCRIPTION - ONSET: ONSET: ON-GOING

## 2023-09-29 NOTE — PROGRESS NOTES
Perfect serve sent to Dr. Moris Xiong about patient request for percocet for pain      Percocet ordered

## 2023-09-29 NOTE — CONSULTS
[facility] CONSULT NOTE    Reason for Consult: Diabetic foot ulcer   Requested by: Reza Malik DO     Chief complaint: Vascular Surgery evaluation    History Obtained From: Patient and EMR     HISTORY [facility]              The patient is a 74 y.o. male with history of DM, hypertension, hyperlipidemia, peripheral vascular disease, right AKA in 4021 complicated by MRSA stump infection with osteomyelitis for which he was seen by Dr. Yvonne Choudhury and has been on doxycycline suppression since 2021, CKD, C difficile infection, transferred to [facility] on 09/29 for vascular surgery evaluation from [address] [facility] where he initially presented on 09/25 worsening left leg ulcer sustained from hitting lateral aspect of his left leg above the ankle against his wheelchair 2 months ago. He was seen by Dr. Louise Mckeon and was recommended monitoring clinically off antibiotics except for doxycycline suppression pending culture results. On transfer, he was afebrile and hemodynamically stable with no leukocytosis. MRI of left lower leg showed large soft tissue wound overlying the distal fibula with findings consistent with acute osteomyelitis of distal fibular shaft, metaphysis and lateral malleolus. Wound Gram stain and culture showed rare polymorphonuclear leukocytes, rare epithelial cells, few gram-positive cocci, rare Gram variable rods, mixed radha, heavy growth of Proteus species, heavy growth of gram-positive rods resembling diphtheroids, light growth of beta-hemolytic streptococci group G, anaerobes. Doxycycline was continued. ID service was subsequently consulted for further recommendations.     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 Interpret results with caution. RARE Polymorphonuclear leukocytes Abnormal      RARE EPITHELIAL CELLS Abnormal      FEW GRAM POSITIVE COCCI Abnormal      RARE GRAM VARIABLE RODS Abnormal     Culture  Abnormal   Mixed radha isolated. Further workup and sensitivity testing not routinely indicated and will not be perfomed. Mixed radha isolated includes:    PROTEUS SPECIES HEAVY GROWTH Abnormal      GRAM POSITIVE RODS RESEMBLING DIPHTHEROIDS HEAVY GROWTH Abnormal      STREPTOCOCCUS SPECIES LIGHT GROWTH Abnormal      STREPTOCOCCI, BETA HEMOLYTIC GROUP G LIGHT GROWTH Abnormal        Presumptive anaerobes isolated, verification to follow. Abnormal     Assessment:  Nonhealing wound with abnormal MRI findings of osteomyelitis, left distal fibula    Plan:  Start piperacillin-tazobactam 3.375g q8h. Continue doxycyline 100 mg q12h suppression for now. Follow up Vascular Surgery and Podiatry recommendations. Continue local wound care. Thank you for involving me in the care of Preethi Kate. ID will continue to follow. Please do not hesitate to call for any questions or concerns.     Electronically signed by Herminia Hoffman MD on 9/29/2023 at 11:32 AM

## 2023-09-29 NOTE — CONSULTS
VASCULAR SURGERY CONSULT NOTE    Reason for Consult:  PVD    HPI:    74 y.o. male who presented to [facility] on 9/26 for L ankle/foot wound. Podiatry was consulted and requested transfer to [facility] for vascular surgery evaluation. Pt states the wound to his left ankle has been present for several weeks, after bumping it on part of his wheelchair it began as a small wound and it slowly kept getting bigger. Hx notable for DM, PVD, R AKA '20, CKD3. Patient on cilostazol. States that he has fairly regular rest pain of the left lower extremity, relieved somewhat by hanging his leg over the side of the bed. Pt is minimally ambulatory at baseline, uses wheelchair. Admits to still smoking approx 8 cigarettes daily.        ROS: Negative if blank [], Positive if [x]  General Vascular   [] Fevers [] Claudication (Blocks)   [] Chills [x] Rest Pain   [] Weight Loss [] Tissue Loss   [] Chest Pain [] Clotting Disorder   [] SOB at rest [] Leg Swelling   [] SOB with exertion [] DVT/PE      [] Nausea    [] Vomiting [] Stroke/TIA   [] Abdominal Pain [] Focal weakness   [] Melena [] Slurred Speech   [] Hematochezia [] Vision Changes   [] Hematuria    [] Dysuria [] Hx of Central Catheters   [] Wears Glasses/Contacts [] Dialysis and If so date initiated   [] Blindness    [] Hand Dominant   [] Difficulty swallowing        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[address]) 01/16/2020    Acute kidney injury ([address]) 05/18/2020    AKA stump complication ([address]) 18/25/4267    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 ([address]) 05/22/2018    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 ([address]) 05/21/2018    Cellulitis, scrotum 03/20/2020    Coagulopathy ([address]) 05/21/2018    Critical lower limb ischemia ([address]) 03/20/2020    Diabetes mellitus ([address])     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 2 diabetes mellitus, with fat layer exposed ([address]) 05/22/2018

## 2023-09-29 NOTE — CARE COORDINATION
9/29. Met with the pt at the bedside to discuss transition of care. The pt lives alone. He has a walker, motorized WC. Hx R AKA. PCP is Dr Kiarra Jensen. Vascular consulted. The pt has had [facility] in the past and is ok with using their services again. He is adamantly refusing rehab at this time. Referral made to [facility].  Hazel Orellana RN

## 2023-09-29 NOTE — PROGRESS NOTES
4 Eyes Skin Assessment     NAME:  Jay Soriano  YOB: 1955  MEDICAL RECORD NUMBER:  [id number]    The patient is being assessed for  Admission    I agree that at least one RN has performed a thorough Head to Toe Skin Assessment on the patient. ALL assessment sites listed below have been assessed. Areas assessed by both nurses:    Head, Face, Ears, Shoulders, Back, Chest, Arms, Elbows, Hands, Sacrum. Buttock, Coccyx, Ischium, and Legs. Feet and Heels        Does the Patient have a Wound?  Yes, open wound on outer left ankle        Tarun Prevention initiated by RN: Yes  Wound Care Orders initiated by RN: Yes    Pressure Injury (Stage 3,4, Unstageable, DTI, NWPT, and Complex wounds) if present, place Wound referral order by RN under :Yes    New Ostomies, if present place, Ostomy referral order under : No     Nurse 1 eSignature: Electronically signed by Claire Forbes RN on 9/28/23 at 10:56 PM EDT    **SHARE this note so that the co-signing nurse can place an eSignature**    Nurse 2 eSignature: Electronically signed by Susanne Platt RN on 9/28/23 at 11:16 PM EDT

## 2023-09-29 NOTE — H&P
Hospitalist History & Physical      PCP: Greg Randall DO    Date of Admission: 9/28/2023    Date of Service: Pt seen/examined on 9/28/2023 and is admitted to Inpatient with expected LOS greater than two midnights due to medical therapy. Chief Complaint: left leg wound   History Of Present Illness: 80-year old male patient with type 2 diabetes, hypertension, PVD, smoking, hyperlipidemia who came in as a transfer from [facility] for vascular surgery evaluation given his right lower extremity nonhealing wound. Patient is seen at bedside today after he came from procedure. He endorses severe pain in the right lower extremity in the wound area, feeling cold like having a fever.   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[address]) 01/16/2020    Acute kidney injury ([address]) 05/18/2020    AKA stump complication ([address]) 54/93/9147    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 ([address]) 05/22/2018    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 ([address]) 05/21/2018    Cellulitis, scrotum 03/20/2020    Coagulopathy ([address]) 05/21/2018    Critical lower limb ischemia ([address]) 03/20/2020    Diabetes mellitus ([address])     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 2 diabetes mellitus, with fat layer exposed ([address]) 05/22/2018    Disruption of closure of muscle or muscle flap, sequela 08/26/2020    DVT, lower extremity ([address])     right leg     Encounter for dental examination and cleaning with abnormal findings 04/02/2018    Gas gangrene of thigh ([address]) 03/20/2020    History of DVT (deep vein thrombosis) 07/31/2018    Hyperglycemia due to type 2 diabetes mellitus ([address]) 03/20/2020    Hyperlipidemia     Hypertension     Ischemic ulcer of right thigh with fat layer exposed ([address])     Ischemic ulcer of right thigh with necrosis of disease consulted for antibiotic management  Podiatry consult  Continue home DM meds-- adjust as needed. SSI  Monitor renal function  And management  Of Plavix, cilostazol  Not on statin. Check lipid panel    Diet: Diet NPO  Code Status: Full Code  Surrogate decision maker confirmed with patient:   Extended Emergency Contact Information  Primary Emergency Contact: [address] [facility] Phone: [phone]  Relation: Brother/Sister    DVT Prophylaxis: []Lovenox []Heparin []PCD [] [address] []Encouraged ambulation  Disposition: []Med/Surg [] Intermediate [] ICU/CCU  Admit status: [] Observation [] Inpatient     +++++++++++++++++++++++++++++++++++++++++++++++++  Juju Jo MD  [facility]  +++++++++++++++++++++++++++++++++++++++++++++++++  NOTE: This report was transcribed using voice recognition software. Every effort was made to ensure accuracy; however, inadvertent computerized transcription errors may be present.

## 2023-09-29 NOTE — PROGRESS NOTES
Physical Therapy    Date: 2023       Patient Name: Jesus Presley  : 1955      MRN: [id number]    PT order received. Chart has been reviewed. PT evaluation will be on hold due to patient at cath lab then 6hr bedrest per RN. Will continue to follow and complete evaluation at later time.      Francisca Suggs, PT

## 2023-09-29 NOTE — PROGRESS NOTES
OCCUPATIONAL THERAPY TREATMENT NOTE    [facility]      OT BEDSIDE TREATMENT NOTE      Date:2023  Patient Name: Renu Abarca  MRN: [id number]  : 1955  Room: [address] [place], [place] [address]     OT orders received & appreciated, chart reviewed, pt. currently off unit at cath. lab. Per RN, pt. will be bedrest for 6 hours post procedure. Will follow. Renetta Denton, OTR/L   License #  [id number]

## 2023-09-29 NOTE — OP NOTE
Cardiovascular Lab Procedure Report    Day Orozco  1955    Date : 9/29/2023  Surgeon: Jeancarlos Knutson M.D. Pre-procedure Diagnosis: L LE tissue loss  Post-procedure Diagnosis: Same  Procedure:      Left  common femoral artery access - antegrade access  with US guidance   46411 Left lower extremity angiogram   32847 Left SFA, pop plasty with 6x150 DCB impact 0.18  Predilation with 6x200   46976 Left TP trunk, proximal PT plasty with 4x80    Anesthesia: Local with IV sedation  Assistants: Cath Lab Staff  Estimated Blood Loss: Minimal  Complications: none  Findings: Abdominal aorta :  Artery Left Left s/p Intervention   Superficial femoral  Patent, distal disease Patent   AK popliteal occluded Patent   BK popliteal occluded Patent   Anterior tibial Patent, distal occluded at ankle Patent, distally occluded at ankle   TP trunk > 80% stenosis Patent   Posterior tibial Proximal > 80% stenosis, patent, primary runoff to foot Patent, some disease but primary runoff to foot   Peroneal Proximally occluded Proximally occluded               Procedure Details : There was not previous CTA , or catheter based diagnostic imaging preformed prior to today's procedure. Timeout preformed identifying pt and procedure. Groins prepped and draped in sterile fashion. Patient given sedation as needed throughout the case. Left  common femoral artery was noted to be patent and was accessed under ultrasound guidance after infiltrating with local in antegrade fashion with cook needle. Unable to place Micropuncture needle. Significant occlusive disease was noted in the sfa, po and tibials. Advantage glide wire and 0.35 trailblazer catheter advanced distally to cross popliteal occlusion. Patient was given 10,000 units of heparin. The catheter was placed in the below knee popliteal artery and angiogram was preformed to further evaluate the tibial arteries and runoff.       The popliteal lesion was treated

## 2023-09-30 LAB
BASOPHILS # BLD: 0 K/UL (ref 0–0.2)
BASOPHILS NFR BLD: 0 % (ref 0–2)
CHOLEST SERPL-MCNC: 129 MG/DL
EOSINOPHIL # BLD: 0 K/UL (ref 0.05–0.5)
EOSINOPHILS RELATIVE PERCENT: 0 % (ref 0–6)
ERYTHROCYTE [DISTWIDTH] IN BLOOD BY AUTOMATED COUNT: 14.4 % (ref 11.5–15)
GLUCOSE BLD-MCNC: 195 MG/DL (ref 74–99)
GLUCOSE BLD-MCNC: 196 MG/DL (ref 74–99)
GLUCOSE BLD-MCNC: 202 MG/DL (ref 74–99)
GLUCOSE BLD-MCNC: 220 MG/DL (ref 74–99)
HCT VFR BLD AUTO: 29.3 % (ref 37–54)
HDLC SERPL-MCNC: 36 MG/DL
HGB BLD-MCNC: 9.4 G/DL (ref 12.5–16.5)
LDLC SERPL CALC-MCNC: 71 MG/DL
LYMPHOCYTES NFR BLD: 1.53 K/UL (ref 1.5–4)
LYMPHOCYTES RELATIVE PERCENT: 8 % (ref 20–42)
MCH RBC QN AUTO: 25.2 PG (ref 26–35)
MCHC RBC AUTO-ENTMCNC: 32.1 G/DL (ref 32–34.5)
MCV RBC AUTO: 78.6 FL (ref 80–99.9)
MONOCYTES NFR BLD: 10 % (ref 2–12)
MONOCYTES NFR BLD: 2.03 K/UL (ref 0.1–0.95)
NEUTROPHILS NFR BLD: 82 % (ref 43–80)
NEUTS SEG NFR BLD: 15.94 K/UL (ref 1.8–7.3)
PLATELET # BLD AUTO: 223 K/UL (ref 130–450)
PMV BLD AUTO: 11.2 FL (ref 7–12)
RBC # BLD AUTO: 3.73 M/UL (ref 3.8–5.8)
RBC # BLD: ABNORMAL 10*6/UL
TRIGL SERPL-MCNC: 108 MG/DL
VLDLC SERPL CALC-MCNC: 22 MG/DL
WBC OTHER # BLD: 19.5 K/UL (ref 4.5–11.5)

## 2023-09-30 PROCEDURE — 82962 GLUCOSE BLOOD TEST: CPT

## 2023-09-30 PROCEDURE — 6370000000 HC RX 637 (ALT 250 FOR IP): Performed by: INTERNAL MEDICINE

## 2023-09-30 PROCEDURE — 6360000002 HC RX W HCPCS: Performed by: STUDENT IN AN ORGANIZED HEALTH CARE EDUCATION/TRAINING PROGRAM

## 2023-09-30 PROCEDURE — 6370000000 HC RX 637 (ALT 250 FOR IP)

## 2023-09-30 PROCEDURE — 36415 COLL VENOUS BLD VENIPUNCTURE: CPT

## 2023-09-30 PROCEDURE — 2580000003 HC RX 258: Performed by: INTERNAL MEDICINE

## 2023-09-30 PROCEDURE — 1200000000 HC SEMI PRIVATE

## 2023-09-30 PROCEDURE — 6360000002 HC RX W HCPCS

## 2023-09-30 PROCEDURE — 2580000003 HC RX 258

## 2023-09-30 PROCEDURE — 6360000002 HC RX W HCPCS: Performed by: INTERNAL MEDICINE

## 2023-09-30 PROCEDURE — 6370000000 HC RX 637 (ALT 250 FOR IP): Performed by: STUDENT IN AN ORGANIZED HEALTH CARE EDUCATION/TRAINING PROGRAM

## 2023-09-30 PROCEDURE — 80061 LIPID PANEL: CPT

## 2023-09-30 PROCEDURE — 85025 COMPLETE CBC W/AUTO DIFF WBC: CPT

## 2023-09-30 RX ORDER — NICOTINE 21 MG/24HR
1 PATCH, TRANSDERMAL 24 HOURS TRANSDERMAL DAILY
Status: DISCONTINUED | OUTPATIENT
Start: 2023-09-30 | End: 2023-10-06 | Stop reason: HOSPADM

## 2023-09-30 RX ORDER — LINEZOLID 600 MG/1
600 TABLET, FILM COATED ORAL EVERY 12 HOURS SCHEDULED
Status: DISCONTINUED | OUTPATIENT
Start: 2023-09-30 | End: 2023-10-06 | Stop reason: HOSPADM

## 2023-09-30 RX ADMIN — OXYCODONE AND ACETAMINOPHEN 1 TABLET: 325; 5 TABLET ORAL at 16:41

## 2023-09-30 RX ADMIN — DOXYCYCLINE HYCLATE 100 MG: 100 CAPSULE ORAL at 09:06

## 2023-09-30 RX ADMIN — OXYCODONE HYDROCHLORIDE 10 MG: 10 TABLET, FILM COATED, EXTENDED RELEASE ORAL at 21:27

## 2023-09-30 RX ADMIN — Medication 2 CAPSULE: at 09:04

## 2023-09-30 RX ADMIN — LINEZOLID 600 MG: 600 TABLET, FILM COATED ORAL at 10:30

## 2023-09-30 RX ADMIN — CLONIDINE HYDROCHLORIDE 0.1 MG: 0.1 TABLET ORAL at 09:06

## 2023-09-30 RX ADMIN — INSULIN GLARGINE 10 UNITS: 100 INJECTION, SOLUTION SUBCUTANEOUS at 09:06

## 2023-09-30 RX ADMIN — ENOXAPARIN SODIUM 30 MG: 100 INJECTION SUBCUTANEOUS at 09:07

## 2023-09-30 RX ADMIN — ENOXAPARIN SODIUM 30 MG: 100 INJECTION SUBCUTANEOUS at 21:29

## 2023-09-30 RX ADMIN — CILOSTAZOL 50 MG: 50 TABLET ORAL at 16:37

## 2023-09-30 RX ADMIN — CLONIDINE HYDROCHLORIDE 0.1 MG: 0.1 TABLET ORAL at 21:26

## 2023-09-30 RX ADMIN — OXYCODONE HYDROCHLORIDE 10 MG: 10 TABLET, FILM COATED, EXTENDED RELEASE ORAL at 09:06

## 2023-09-30 RX ADMIN — MORPHINE SULFATE 2 MG: 2 INJECTION, SOLUTION INTRAMUSCULAR; INTRAVENOUS at 18:10

## 2023-09-30 RX ADMIN — INSULIN LISPRO 2 UNITS: 100 INJECTION, SOLUTION INTRAVENOUS; SUBCUTANEOUS at 12:44

## 2023-09-30 RX ADMIN — AMLODIPINE BESYLATE 10 MG: 10 TABLET ORAL at 09:06

## 2023-09-30 RX ADMIN — PIPERACILLIN AND TAZOBACTAM 3375 MG: 3; .375 INJECTION, POWDER, LYOPHILIZED, FOR SOLUTION INTRAVENOUS at 09:19

## 2023-09-30 RX ADMIN — MORPHINE SULFATE 2 MG: 2 INJECTION, SOLUTION INTRAMUSCULAR; INTRAVENOUS at 10:53

## 2023-09-30 RX ADMIN — DULOXETINE HYDROCHLORIDE 60 MG: 60 CAPSULE, DELAYED RELEASE ORAL at 09:05

## 2023-09-30 RX ADMIN — PIPERACILLIN AND TAZOBACTAM 3375 MG: 3; .375 INJECTION, POWDER, LYOPHILIZED, FOR SOLUTION INTRAVENOUS at 00:58

## 2023-09-30 RX ADMIN — PRIMIDONE 50 MG: 50 TABLET ORAL at 21:26

## 2023-09-30 RX ADMIN — HYDRALAZINE HYDROCHLORIDE 50 MG: 50 TABLET, FILM COATED ORAL at 09:06

## 2023-09-30 RX ADMIN — DOXYCYCLINE HYCLATE 100 MG: 100 CAPSULE ORAL at 21:27

## 2023-09-30 RX ADMIN — INSULIN GLARGINE 30 UNITS: 100 INJECTION, SOLUTION SUBCUTANEOUS at 21:28

## 2023-09-30 RX ADMIN — PIPERACILLIN AND TAZOBACTAM 3375 MG: 3; .375 INJECTION, POWDER, LYOPHILIZED, FOR SOLUTION INTRAVENOUS at 16:36

## 2023-09-30 RX ADMIN — ATORVASTATIN CALCIUM 40 MG: 40 TABLET, FILM COATED ORAL at 21:24

## 2023-09-30 RX ADMIN — SODIUM CHLORIDE, PRESERVATIVE FREE 10 ML: 5 INJECTION INTRAVENOUS at 09:07

## 2023-09-30 RX ADMIN — CLOPIDOGREL BISULFATE 75 MG: 75 TABLET ORAL at 09:04

## 2023-09-30 RX ADMIN — CLONIDINE HYDROCHLORIDE 0.1 MG: 0.1 TABLET ORAL at 14:23

## 2023-09-30 RX ADMIN — CILOSTAZOL 50 MG: 50 TABLET ORAL at 06:14

## 2023-09-30 RX ADMIN — CHLORTHALIDONE 25 MG: 25 TABLET ORAL at 09:05

## 2023-09-30 RX ADMIN — METOPROLOL TARTRATE 25 MG: 25 TABLET, FILM COATED ORAL at 09:05

## 2023-09-30 RX ADMIN — LINEZOLID 600 MG: 600 TABLET, FILM COATED ORAL at 21:26

## 2023-09-30 ASSESSMENT — PAIN DESCRIPTION - FREQUENCY
FREQUENCY: CONTINUOUS
FREQUENCY: CONTINUOUS

## 2023-09-30 ASSESSMENT — PAIN DESCRIPTION - ORIENTATION
ORIENTATION: LEFT

## 2023-09-30 ASSESSMENT — PAIN SCALES - GENERAL
PAINLEVEL_OUTOF10: 8
PAINLEVEL_OUTOF10: 8
PAINLEVEL_OUTOF10: 2
PAINLEVEL_OUTOF10: 9
PAINLEVEL_OUTOF10: 9

## 2023-09-30 ASSESSMENT — PAIN DESCRIPTION - ONSET
ONSET: ON-GOING
ONSET: ON-GOING

## 2023-09-30 ASSESSMENT — PAIN DESCRIPTION - DESCRIPTORS
DESCRIPTORS: ACHING;STABBING;THROBBING
DESCRIPTORS: SHOOTING;ACHING;DISCOMFORT
DESCRIPTORS: ACHING;DISCOMFORT;SORE

## 2023-09-30 ASSESSMENT — PAIN - FUNCTIONAL ASSESSMENT
PAIN_FUNCTIONAL_ASSESSMENT: PREVENTS OR INTERFERES SOME ACTIVE ACTIVITIES AND ADLS

## 2023-09-30 ASSESSMENT — PAIN DESCRIPTION - LOCATION
LOCATION: FOOT
LOCATION: FOOT
LOCATION: LEG
LOCATION: LEG

## 2023-09-30 ASSESSMENT — PAIN DESCRIPTION - PAIN TYPE
TYPE: ACUTE PAIN
TYPE: ACUTE PAIN

## 2023-09-30 NOTE — PROGRESS NOTES
gram-positive rods, diphtheroids  H/o right AKA  Type 2 diabetes mellitus  Hyperlipidemia  Hypertension  Obesity  Smoking     PLAN:    -Infectious disease on board. Antibiotics escalated to Zosyn, linezolid, continue doxycycline for suppression  -Vascular surgery recommended medical management so far for his PVD.   Continue on cilostazol and is started on Plavix  -Podiatry was consulted  -Pain management  -Bowel regiment  -Tight glucose control  -Point-of-care  DVT prophylaxis Lovenox    DISPOSITION: Inpatient care    Medications:  REVIEWED DAILY    Infusion Medications    sodium chloride 50 mL/hr at 09/29/23 1654    sodium chloride      dextrose       Scheduled Medications    clopidogrel  75 mg Oral Daily    doxycycline hyclate  100 mg Oral BID    atorvastatin  40 mg Oral Nightly    piperacillin-tazobactam  3,375 mg IntraVENous Q8H    amLODIPine  10 mg Oral Daily    chlorthalidone  25 mg Oral Daily    cilostazol  50 mg Oral BID AC    cloNIDine  0.1 mg Oral TID    DULoxetine  60 mg Oral Daily    enoxaparin  30 mg SubCUTAneous BID    hydrALAZINE  50 mg Oral BID    insulin glargine  10 Units SubCUTAneous QAM    insulin glargine  30 Units SubCUTAneous Nightly    insulin lispro  0-4 Units SubCUTAneous Nightly    insulin lispro  0-8 Units SubCUTAneous TID WC    lactobacillus  2 capsule Oral Daily    metoprolol tartrate  25 mg Oral BID    mirtazapine  7.5 mg Oral Nightly    oxyCODONE  10 mg Oral Q12H    primidone  50 mg Oral Nightly    sodium chloride flush  10 mL IntraVENous 2 times per day     PRN Meds: oxyCODONE-acetaminophen, morphine, naloxone, sodium chloride, acetaminophen **OR** acetaminophen, dextrose, dextrose bolus **OR** dextrose bolus, glucagon (rDNA), glucose, magnesium sulfate, melatonin, ondansetron **OR** ondansetron, potassium chloride **OR** potassium alternative oral replacement **OR** potassium chloride, senna, sodium chloride flush    Labs:     Recent Labs     09/28/23  0247 09/30/23  0414   WBC 9.9 19.5*   HGB 9.9* 9.4*   HCT 31.0* 29.3*    223       Recent Labs     09/28/23  0247 09/29/23 0448    135   K 3.9 4.1   CL 99 99   CO2 26 27   BUN 25* 26*   CREATININE 1.9* 2.1*   CALCIUM 9.1 8.8       Recent Labs     09/28/23  0247 09/29/23 0448   PROT 7.2 7.2   ALKPHOS 118 123   ALT 37 39   AST 30 30   BILITOT 0.4 0.4       No results for input(s): \"INR\" in the last 72 hours. No results for input(s): \"CKTOTAL\", \"TROPONINI\" in the last 72 hours. Chronic labs:    Lab Results   Component Value Date    CHOL 129 09/30/2023    TRIG 108 09/30/2023    HDL 36 (L) 09/30/2023    LDLCALC 119 (H) 08/18/2022    TSH 1.410 08/18/2022    PSA 0.64 08/18/2022    INR 1.0 05/12/2021    LABA1C 8.7 (H) 09/27/2023       Radiology: REVIEWED DAILY    +++++++++++++++++++++++++++++++++++++++++++++++++  Shantell Nieves MD  [employer] - [address], [place]  +++++++++++++++++++++++++++++++++++++++++++++++++  NOTE: This report was transcribed using voice recognition software. Every effort was made to ensure accuracy; however, inadvertent computerized transcription errors may be present.

## 2023-09-30 NOTE — PROGRESS NOTES
[facility] PROGRESS  NOTE    Covering for Dr Ty Warner    C/C : left leg wound infection       Pt denies fever or chills  Left leg pain   Afebrile       Current Facility-Administered Medications   Medication Dose Route Frequency Provider Last Rate Last Admin    nicotine (NICODERM CQ) 21 MG/24HR 1 patch  1 patch TransDERmal Daily Radha Mckeon MD        oxyCODONE-acetaminophen (PERCOCET) 5-325 MG per tablet 1 tablet  1 tablet Oral Q4H PRN Colie Vincent, APRN - CNP   1 tablet at 09/29/23 0613    0.9 % sodium chloride infusion   IntraVENous Continuous Colie Vincent, APRN - CNP 50 mL/hr at 09/29/23 1654 Rate Change at 09/29/23 1654    clopidogrel (PLAVIX) tablet 75 mg  75 mg Oral Daily Aliza Hermosillo APRN - CNP   75 mg at 09/30/23 9993    doxycycline hyclate (VIBRAMYCIN) capsule 100 mg  100 mg Oral BID Radha Mckeon MD   100 mg at 09/30/23 7843    morphine (PF) injection 2 mg  2 mg IntraVENous Q3H PRN Radha Mckeon MD   2 mg at 09/29/23 1426    naloxone [facility]) injection 0.4 mg  0.4 mg IntraVENous PRN Radha Mckeon MD        atorvastatin (LIPITOR) tablet 40 mg  40 mg Oral Nightly Radha Mckeon MD   40 mg at 09/29/23 2029    piperacillin-tazobactam (ZOSYN) 3,375 mg in sodium chloride 0.9 % 50 mL IVPB ([id number])  3,375 mg IntraVENous Q8H Ghada George MD 12.5 mL/hr at 09/30/23 0919 3,375 mg at 09/30/23 0919    0.9 % sodium chloride infusion   IntraVENous PRN Colie Vincent, APRN - CNP        acetaminophen (TYLENOL) tablet 650 mg  650 mg Oral Q6H PRN Colie Vincent, APRN - CNP        Or    acetaminophen (TYLENOL) suppository 650 mg  650 mg Rectal Q6H PRN Colie Vincent, APRN - CNP        amLODIPine (NORVASC) tablet 10 mg  10 mg Oral Daily Colie Vincent, APRN - CNP   10 mg at 09/30/23 0906    chlorthalidone (HYGROTON) tablet 25 mg  25 mg Oral Daily Colie Vincent, APRN - CNP   25 mg at 09/30/23 0905    cilostazol (PLETAL) tablet 50 mg  50 mg Oral BID SHONDA Shearer - KVNG   50

## 2023-10-01 ENCOUNTER — APPOINTMENT (OUTPATIENT)
Dept: GENERAL RADIOLOGY | Age: 68
End: 2023-10-01
Attending: STUDENT IN AN ORGANIZED HEALTH CARE EDUCATION/TRAINING PROGRAM
Payer: MEDICARE

## 2023-10-01 LAB
ANION GAP SERPL CALCULATED.3IONS-SCNC: 17 MMOL/L (ref 7–16)
BACTERIA URNS QL MICRO: ABNORMAL
BILIRUB UR QL STRIP: NEGATIVE
BUN SERPL-MCNC: 38 MG/DL (ref 6–23)
CALCIUM SERPL-MCNC: 8.4 MG/DL (ref 8.6–10.2)
CHLORIDE SERPL-SCNC: 101 MMOL/L (ref 98–107)
CLARITY UR: CLEAR
CO2 SERPL-SCNC: 21 MMOL/L (ref 22–29)
COLOR UR: YELLOW
CREAT SERPL-MCNC: 2.4 MG/DL (ref 0.7–1.2)
CREAT UR-MCNC: 112.9 MG/DL (ref 40–278)
ERYTHROCYTE [DISTWIDTH] IN BLOOD BY AUTOMATED COUNT: 14.4 % (ref 11.5–15)
GFR SERPL CREATININE-BSD FRML MDRD: 29 ML/MIN/1.73M2
GLUCOSE BLD-MCNC: 160 MG/DL (ref 74–99)
GLUCOSE BLD-MCNC: 176 MG/DL (ref 74–99)
GLUCOSE BLD-MCNC: 190 MG/DL (ref 74–99)
GLUCOSE BLD-MCNC: 208 MG/DL (ref 74–99)
GLUCOSE SERPL-MCNC: 154 MG/DL (ref 74–99)
GLUCOSE UR STRIP-MCNC: NEGATIVE MG/DL
HCT VFR BLD AUTO: 26.7 % (ref 37–54)
HGB BLD-MCNC: 8.5 G/DL (ref 12.5–16.5)
HGB UR QL STRIP.AUTO: ABNORMAL
KETONES UR STRIP-MCNC: NEGATIVE MG/DL
LEUKOCYTE ESTERASE UR QL STRIP: NEGATIVE
MCH RBC QN AUTO: 25.1 PG (ref 26–35)
MCHC RBC AUTO-ENTMCNC: 31.8 G/DL (ref 32–34.5)
MCV RBC AUTO: 79 FL (ref 80–99.9)
MICROORGANISM SPEC CULT: NORMAL
MICROORGANISM SPEC CULT: NORMAL
NITRITE UR QL STRIP: NEGATIVE
PH UR STRIP: 6 [PH] (ref 5–9)
PLATELET # BLD AUTO: 204 K/UL (ref 130–450)
PMV BLD AUTO: 11.2 FL (ref 7–12)
POTASSIUM SERPL-SCNC: 3.7 MMOL/L (ref 3.5–5)
PROT UR STRIP-MCNC: 100 MG/DL
RBC # BLD AUTO: 3.38 M/UL (ref 3.8–5.8)
RBC #/AREA URNS HPF: ABNORMAL /HPF
SERVICE CMNT-IMP: NORMAL
SERVICE CMNT-IMP: NORMAL
SODIUM SERPL-SCNC: 139 MMOL/L (ref 132–146)
SP GR UR STRIP: 1.01 (ref 1–1.03)
SPECIMEN DESCRIPTION: NORMAL
SPECIMEN DESCRIPTION: NORMAL
TOTAL PROTEIN, URINE: 135 MG/DL (ref 0–12)
URINE TOTAL PROTEIN CREATININE RATIO: 1.2 (ref 0–0.2)
UROBILINOGEN UR STRIP-ACNC: 0.2 EU/DL (ref 0–1)
UUN UR-MCNC: 596 MG/DL (ref 800–1666)
WBC #/AREA URNS HPF: ABNORMAL /HPF
WBC OTHER # BLD: 17 K/UL (ref 4.5–11.5)

## 2023-10-01 PROCEDURE — 2580000003 HC RX 258

## 2023-10-01 PROCEDURE — 36415 COLL VENOUS BLD VENIPUNCTURE: CPT

## 2023-10-01 PROCEDURE — 85027 COMPLETE CBC AUTOMATED: CPT

## 2023-10-01 PROCEDURE — 6360000002 HC RX W HCPCS

## 2023-10-01 PROCEDURE — 97535 SELF CARE MNGMENT TRAINING: CPT

## 2023-10-01 PROCEDURE — 84540 ASSAY OF URINE/UREA-N: CPT

## 2023-10-01 PROCEDURE — 97165 OT EVAL LOW COMPLEX 30 MIN: CPT

## 2023-10-01 PROCEDURE — 82570 ASSAY OF URINE CREATININE: CPT

## 2023-10-01 PROCEDURE — 81001 URINALYSIS AUTO W/SCOPE: CPT

## 2023-10-01 PROCEDURE — 6370000000 HC RX 637 (ALT 250 FOR IP)

## 2023-10-01 PROCEDURE — 6370000000 HC RX 637 (ALT 250 FOR IP): Performed by: INTERNAL MEDICINE

## 2023-10-01 PROCEDURE — 71045 X-RAY EXAM CHEST 1 VIEW: CPT

## 2023-10-01 PROCEDURE — 80048 BASIC METABOLIC PNL TOTAL CA: CPT

## 2023-10-01 PROCEDURE — 6360000002 HC RX W HCPCS: Performed by: STUDENT IN AN ORGANIZED HEALTH CARE EDUCATION/TRAINING PROGRAM

## 2023-10-01 PROCEDURE — 2580000003 HC RX 258: Performed by: INTERNAL MEDICINE

## 2023-10-01 PROCEDURE — 82962 GLUCOSE BLOOD TEST: CPT

## 2023-10-01 PROCEDURE — 84156 ASSAY OF PROTEIN URINE: CPT

## 2023-10-01 PROCEDURE — 97530 THERAPEUTIC ACTIVITIES: CPT

## 2023-10-01 PROCEDURE — 6370000000 HC RX 637 (ALT 250 FOR IP): Performed by: STUDENT IN AN ORGANIZED HEALTH CARE EDUCATION/TRAINING PROGRAM

## 2023-10-01 PROCEDURE — 6360000002 HC RX W HCPCS: Performed by: INTERNAL MEDICINE

## 2023-10-01 PROCEDURE — 1200000000 HC SEMI PRIVATE

## 2023-10-01 PROCEDURE — 97161 PT EVAL LOW COMPLEX 20 MIN: CPT

## 2023-10-01 RX ADMIN — CHLORTHALIDONE 25 MG: 25 TABLET ORAL at 09:19

## 2023-10-01 RX ADMIN — HYDRALAZINE HYDROCHLORIDE 50 MG: 50 TABLET, FILM COATED ORAL at 09:17

## 2023-10-01 RX ADMIN — MORPHINE SULFATE 2 MG: 2 INJECTION, SOLUTION INTRAMUSCULAR; INTRAVENOUS at 13:08

## 2023-10-01 RX ADMIN — MORPHINE SULFATE 2 MG: 2 INJECTION, SOLUTION INTRAMUSCULAR; INTRAVENOUS at 22:08

## 2023-10-01 RX ADMIN — CLONIDINE HYDROCHLORIDE 0.1 MG: 0.1 TABLET ORAL at 14:53

## 2023-10-01 RX ADMIN — ATORVASTATIN CALCIUM 40 MG: 40 TABLET, FILM COATED ORAL at 20:02

## 2023-10-01 RX ADMIN — SODIUM CHLORIDE: 9 INJECTION, SOLUTION INTRAVENOUS at 12:28

## 2023-10-01 RX ADMIN — CILOSTAZOL 50 MG: 50 TABLET ORAL at 16:36

## 2023-10-01 RX ADMIN — HYDRALAZINE HYDROCHLORIDE 50 MG: 50 TABLET, FILM COATED ORAL at 20:02

## 2023-10-01 RX ADMIN — METOPROLOL TARTRATE 25 MG: 25 TABLET, FILM COATED ORAL at 20:02

## 2023-10-01 RX ADMIN — OXYCODONE HYDROCHLORIDE 10 MG: 10 TABLET, FILM COATED, EXTENDED RELEASE ORAL at 20:01

## 2023-10-01 RX ADMIN — DOXYCYCLINE HYCLATE 100 MG: 100 CAPSULE ORAL at 20:01

## 2023-10-01 RX ADMIN — ENOXAPARIN SODIUM 30 MG: 100 INJECTION SUBCUTANEOUS at 20:01

## 2023-10-01 RX ADMIN — CLONIDINE HYDROCHLORIDE 0.1 MG: 0.1 TABLET ORAL at 20:02

## 2023-10-01 RX ADMIN — LINEZOLID 600 MG: 600 TABLET, FILM COATED ORAL at 20:02

## 2023-10-01 RX ADMIN — OXYCODONE AND ACETAMINOPHEN 1 TABLET: 325; 5 TABLET ORAL at 00:17

## 2023-10-01 RX ADMIN — CLONIDINE HYDROCHLORIDE 0.1 MG: 0.1 TABLET ORAL at 09:19

## 2023-10-01 RX ADMIN — OXYCODONE HYDROCHLORIDE 10 MG: 10 TABLET, FILM COATED, EXTENDED RELEASE ORAL at 09:31

## 2023-10-01 RX ADMIN — PIPERACILLIN AND TAZOBACTAM 3375 MG: 3; .375 INJECTION, POWDER, LYOPHILIZED, FOR SOLUTION INTRAVENOUS at 00:22

## 2023-10-01 RX ADMIN — OXYCODONE AND ACETAMINOPHEN 1 TABLET: 325; 5 TABLET ORAL at 05:47

## 2023-10-01 RX ADMIN — INSULIN GLARGINE 30 UNITS: 100 INJECTION, SOLUTION SUBCUTANEOUS at 20:02

## 2023-10-01 RX ADMIN — CILOSTAZOL 50 MG: 50 TABLET ORAL at 05:48

## 2023-10-01 RX ADMIN — DOXYCYCLINE HYCLATE 100 MG: 100 CAPSULE ORAL at 09:18

## 2023-10-01 RX ADMIN — SODIUM CHLORIDE, PRESERVATIVE FREE 10 ML: 5 INJECTION INTRAVENOUS at 21:00

## 2023-10-01 RX ADMIN — SODIUM CHLORIDE, PRESERVATIVE FREE 10 ML: 5 INJECTION INTRAVENOUS at 13:09

## 2023-10-01 RX ADMIN — ENOXAPARIN SODIUM 30 MG: 100 INJECTION SUBCUTANEOUS at 09:17

## 2023-10-01 RX ADMIN — METOPROLOL TARTRATE 25 MG: 25 TABLET, FILM COATED ORAL at 09:19

## 2023-10-01 RX ADMIN — PIPERACILLIN AND TAZOBACTAM 3375 MG: 3; .375 INJECTION, POWDER, LYOPHILIZED, FOR SOLUTION INTRAVENOUS at 09:00

## 2023-10-01 RX ADMIN — SODIUM CHLORIDE, PRESERVATIVE FREE 10 ML: 5 INJECTION INTRAVENOUS at 09:20

## 2023-10-01 RX ADMIN — LINEZOLID 600 MG: 600 TABLET, FILM COATED ORAL at 09:18

## 2023-10-01 RX ADMIN — MORPHINE SULFATE 2 MG: 2 INJECTION, SOLUTION INTRAMUSCULAR; INTRAVENOUS at 04:03

## 2023-10-01 RX ADMIN — PRIMIDONE 50 MG: 50 TABLET ORAL at 20:02

## 2023-10-01 RX ADMIN — Medication 2 CAPSULE: at 09:18

## 2023-10-01 RX ADMIN — CLOPIDOGREL BISULFATE 75 MG: 75 TABLET ORAL at 09:17

## 2023-10-01 RX ADMIN — PIPERACILLIN AND TAZOBACTAM 3375 MG: 3; .375 INJECTION, POWDER, LYOPHILIZED, FOR SOLUTION INTRAVENOUS at 16:43

## 2023-10-01 RX ADMIN — INSULIN GLARGINE 10 UNITS: 100 INJECTION, SOLUTION SUBCUTANEOUS at 09:19

## 2023-10-01 RX ADMIN — AMLODIPINE BESYLATE 10 MG: 10 TABLET ORAL at 09:18

## 2023-10-01 ASSESSMENT — PAIN DESCRIPTION - ONSET
ONSET: ON-GOING

## 2023-10-01 ASSESSMENT — PAIN DESCRIPTION - LOCATION
LOCATION: FOOT
LOCATION: ANKLE
LOCATION: FOOT
LOCATION: FOOT

## 2023-10-01 ASSESSMENT — PAIN DESCRIPTION - ORIENTATION
ORIENTATION: LEFT
ORIENTATION: RIGHT

## 2023-10-01 ASSESSMENT — PAIN SCALES - GENERAL
PAINLEVEL_OUTOF10: 8
PAINLEVEL_OUTOF10: 2
PAINLEVEL_OUTOF10: 3
PAINLEVEL_OUTOF10: 9
PAINLEVEL_OUTOF10: 9
PAINLEVEL_OUTOF10: 4
PAINLEVEL_OUTOF10: 10
PAINLEVEL_OUTOF10: 8
PAINLEVEL_OUTOF10: 8

## 2023-10-01 ASSESSMENT — PAIN DESCRIPTION - PAIN TYPE
TYPE: ACUTE PAIN

## 2023-10-01 ASSESSMENT — PAIN DESCRIPTION - FREQUENCY
FREQUENCY: CONTINUOUS

## 2023-10-01 ASSESSMENT — PAIN DESCRIPTION - DESCRIPTORS
DESCRIPTORS: SHARP
DESCRIPTORS: ACHING;DISCOMFORT;THROBBING;SORE
DESCRIPTORS: SPASM;STABBING;THROBBING
DESCRIPTORS: ACHING;DISCOMFORT;SORE
DESCRIPTORS: ACHING;DISCOMFORT;SORE
DESCRIPTORS: ACHING;SORE;TENDER
DESCRIPTORS: ACHING;SORE;TENDER

## 2023-10-01 ASSESSMENT — PAIN - FUNCTIONAL ASSESSMENT
PAIN_FUNCTIONAL_ASSESSMENT: PREVENTS OR INTERFERES SOME ACTIVE ACTIVITIES AND ADLS

## 2023-10-01 NOTE — PROGRESS NOTES
Podiatry Progress Note  10/1/2023   Fouzia Osborn       SUBJECTIVE: Patient being seen for follow up of left ankle wound. Complains of mild pain to ankle. States that he hasn't been able to get quality sleep while in the hospital. Patient was transferred to [facility] for vascular evaluation. No additional pedal complaints.      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 ([address]) 05/22/2018    Atherosclerosis of native artery of left leg with ulceration of ankle ([address]) 9/29/2023    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 ([address]) 05/21/2018    Cellulitis, scrotum 03/20/2020    Coagulopathy ([address]) 05/21/2018    Critical lower limb ischemia ([address]) 03/20/2020    Diabetes mellitus ([address])     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 2 diabetes mellitus, with fat layer exposed ([address]) 05/22/2018    Disruption of closure of muscle or muscle flap, sequela 08/26/2020    DVT, lower extremity ([address])     right leg     Encounter for dental examination and cleaning with abnormal findings 04/02/2018    Gas gangrene of thigh ([address]) 03/20/2020    History of DVT (deep vein thrombosis) 07/31/2018    Hyperglycemia due to type 2 diabetes mellitus ([address]) 03/20/2020    Hyperlipidemia     Hypertension     Ischemic ulcer of right thigh with fat layer exposed ([address])     Ischemic ulcer of right thigh with necrosis of muscle (HCC)     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 Results pending  - Vascular: No plans for surgical intervention unless wound not progressing. Could consider further tibial intervention.   - Patient at high risk for limb loss due to severe infection and vascular compromise   - Dressing :zahira loo dsd   - Will continue to monitor while in house  - Discussed with Dr Kalee Cardona DPM   10/1/2023   9:57 AM       Thank you for involving podiatry in this patient's care.  Please do not hesitate to call with any questions, concerns, or new findings

## 2023-10-01 NOTE — PROGRESS NOTES
[address]        PASX:                                                  Patient Name: Santhosh Jesus    MRN: [id number]    : 1955    Room: [address] [place] [address]          Evaluating OT: Servando Bragg OTR/L; [id number]       Referring Provider: SHONDA Duke CNP    Specific Provider Orders/Date: OT Eval and Treat 23      Diagnosis: Atherosclerosis of native artery of left leg with ulceration of ankle;  Penetrating foot wound, left. Surgery: 23 PVD, L LE tissue loss, s/p L LEangiogram - L SFA/pop/TP trunk/proximal PT plasty.     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), Acute kidney injury ([address]), AKA stump complication ([address]), 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 ([address]), Atherosclerosis of native artery of left leg with ulceration of ankle ([address]), 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 ([address]), Cellulitis, scrotum, Coagulopathy ([address]), Critical lower limb ischemia ([address]), Diabetes mellitus ([address]), 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 2 diabetes mellitus, with fat layer exposed ([address]), Disruption of closure of muscle or muscle flap, sequela, DVT, lower extremity ([address]), Encounter for dental examination and cleaning with abnormal findings, Gas gangrene of thigh ([address]), History of DVT (deep vein thrombosis), Hyperglycemia due to type 2 diabetes mellitus ([address]), Hyperlipidemia, Hypertension, Ischemic ulcer of right thigh with fat layer exposed ([address]), Ischemic ulcer of right thigh with necrosis of muscle ([address]), 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

## 2023-10-01 NOTE — PROGRESS NOTES
distress  Eyes: Sclerae anicteric, no conjunctival erythema  ENT: No buccal lesion, no pharyngeal exudates  Neck: No nuchal rigidity, no cervical adenopathy  Lungs: Clear breath sounds, no crackles, no wheezes  Heart: Regular rate and rhythm, no murmurs  Abdomen: Bowel sounds present, soft, nontender  Skin: Warm and dry, no active dermatoses  Musculoskeletal: Left distal foot wound malodorous with copious purulent and bloody drainage          Labs, imaging, and medical records/notes were personally reviewed.     Laboratory:  Lab Results   Component Value Date    WBC 17.0 (H) 10/01/2023    WBC 19.5 (H) 09/30/2023    WBC 9.9 09/28/2023    HGB 8.5 (L) 10/01/2023    HCT 26.7 (L) 10/01/2023    MCV 79.0 (L) 10/01/2023     10/01/2023     Lab Results   Component Value Date    NEUTROABS 15.94 (H) 09/30/2023    NEUTROABS 7.44 (H) 09/28/2023    NEUTROABS 8.31 (H) 09/27/2023     Lab Results   Component Value Date    CRP 74.0 (H) 09/25/2023    CRP 1.6 (H) 03/06/2023    CRP 1.6 (H) 12/05/2022     No results found for: \"CRPHS\"  Lab Results   Component Value Date    SEDRATE 88 (H) 09/25/2023    SEDRATE 23 (H) 03/06/2023    SEDRATE 13 12/05/2022     Lab Results   Component Value Date    ALT 39 09/29/2023    AST 30 09/29/2023    ALKPHOS 123 09/29/2023    BILITOT 0.4 09/29/2023     Lab Results   Component Value Date/Time     10/01/2023 04:15 AM    K 3.7 10/01/2023 04:15 AM    K 4.3 05/12/2021 02:24 PM     10/01/2023 04:15 AM    CO2 21 10/01/2023 04:15 AM    BUN 38 10/01/2023 04:15 AM    CREATININE 2.4 10/01/2023 04:15 AM    CREATININE 2.1 09/29/2023 04:48 AM    CREATININE 1.9 09/28/2023 02:47 AM    GFRAA 49 08/18/2022 05:22 PM    LABGLOM 29 10/01/2023 04:15 AM    GLUCOSE 154 10/01/2023 04:15 AM    PROT 7.2 09/29/2023 04:48 AM    LABALBU 2.9 09/29/2023 04:48 AM    CALCIUM 8.4 10/01/2023 04:15 AM    BILITOT 0.4 09/29/2023 04:48 AM    ALKPHOS 123 09/29/2023 04:48 AM    AST 30 09/29/2023 04:48 AM    ALT 39 09/29/2023 04:48 AM       Radiology: MRI left foot: Large soft tissue wound overlying the distal fibula. Findings consistent with acute osteomyelitis of the distal fibular shaft,  metaphysis and lateral malleolus. Motion degrades the quality of the exam.     Diffuse nonspecific myositis. Diffuse nonspecific subcutaneous edema. Microbiology:   Culture, Wound Aerobic Only  Order: [id number]  Status: Final result     Visible to patient: No (not released)     Next appt: 12/11/2023 at 01:00 PM in Infectious Diseases SHONDA Avery CNP)     Specimen Information: Leg   0 Result Notes      Component    Specimen Description . LEG LEFT    Direct Exam Swab collections are low-yield and rarely indicated. Generally, the specimen volume when collected by swab is small, reducing the probability of isolating organisms: many organisms adhere to the fibers of the swab, which reduces the opportunity or recovering organisms. Interpret results with caution. RARE Polymorphonuclear leukocytes Abnormal      RARE EPITHELIAL CELLS Abnormal      FEW GRAM POSITIVE COCCI Abnormal      RARE GRAM VARIABLE RODS Abnormal     Culture  Abnormal   Mixed radha isolated. Further workup and sensitivity testing not routinely indicated and will not be perfomed. Mixed radha isolated includes:    PROTEUS SPECIES HEAVY GROWTH Abnormal      GRAM POSITIVE RODS RESEMBLING DIPHTHEROIDS HEAVY GROWTH Abnormal      STREPTOCOCCUS SPECIES LIGHT GROWTH Abnormal      STREPTOCOCCI, BETA HEMOLYTIC GROUP G LIGHT GROWTH Abnormal        Presumptive anaerobes isolated, verification to follow. Abnormal     Assessment:  Nonhealing wound with abnormal MRI findings of osteomyelitis, left distal fibula  Leukocytosis     Plan:  Piperacillin-tazobactam 3.375g q8h. Linezolid 600 mg po q 12 hrs   Hold doxycycline for now    Follow up Vascular Surgery and Podiatry recommendations. Continue local wound care.   CBC with diff         Electronically signed by Sommer Reed

## 2023-10-01 NOTE — PROGRESS NOTES
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[address]), Acute kidney injury ([address]), AKA stump complication ([address]), 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 ([address]), Atherosclerosis of native artery of left leg with ulceration of ankle ([address]), 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 ([address]), Cellulitis, scrotum, Coagulopathy ([address]), Critical lower limb ischemia ([address]), Diabetes mellitus ([address]), 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 2 diabetes mellitus, with fat layer exposed ([address]), Disruption of closure of muscle or muscle flap, sequela, DVT, lower extremity ([address]), Encounter for dental examination and cleaning with abnormal findings, Gas gangrene of thigh ([address]), History of DVT (deep vein thrombosis), Hyperglycemia due to type 2 diabetes mellitus ([address]), Hyperlipidemia, Hypertension, Ischemic ulcer of right thigh with fat layer exposed ([address]), Ischemic ulcer of right thigh with necrosis of muscle ([address]), 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 Pain syndrome, chronic, Postoperative wound infection, PVD (peripheral vascular disease) ([address]), Tobacco dependence, Vascular occlusion, Vitamin D deficiency, and Wound infection.     has a past surgical history that includes rhinoplasty (Right); pr i&d below fascia foot 1 bursal space (Right, 2018); femoral positioning in bed to protect skin/joint integrity. Vitals and symptoms were closely monitored throughout session. Pt's/ family goals      Return to PLOF    Prognosis is good  for reaching above PT goals. Patient and or family understand(s) diagnosis, prognosis, and plan of care.  yes,     PHYSICAL THERAPY PLAN OF CARE:    PT POC is established based on physician order and patient diagnosis     Diagnosis:  Penetrating foot wound, left, initial encounter [S91.332A]  Specific instructions for next treatment:  Sit EOB, postural exercises and Transfer to bedside chair  Current Treatment Recommendations:     [x] Strengthening to improve independence with functional mobility   [x] ROM to improve independence with functional mobility   [x] Balance Training to improve static/dynamic balance and to reduce fall risk  [x] Endurance Training to improve activity tolerance during functional mobility   [x] Transfer Training to improve safety and independence with all functional transfers   [x] Gait Training to improve gait mechanics, endurance and asses need for appropriate assistive device  [x] Stair Training in preparation for safe discharge home and/or into the community when appropriate  [] Positioning to prevent skin breakdown and contractures  [x] Safety and Education Training   [] Patient/Caregiver Education   [] HEP  [] Gait Team to be added to POC  [] Other     PT long term treatment goals are located in above grid    Frequency of treatments: 2-5x/week x 1-2 weeks. Time in  0830  Time out  0855    Total Treatment Time  10 minutes     Evaluation Time includes thorough review of current medical information, gathering information on past medical history/social history and prior level of function, completion of standardized testing/informal observation of tasks, assessment of data and education on plan of care and goals.     CPT codes:  [x] Low Complexity PT evaluation 72139  [] Moderate Complexity PT evaluation

## 2023-10-02 LAB
ANION GAP SERPL CALCULATED.3IONS-SCNC: 11 MMOL/L (ref 7–16)
BUN SERPL-MCNC: 33 MG/DL (ref 6–23)
CALCIUM SERPL-MCNC: 8.5 MG/DL (ref 8.6–10.2)
CHLORIDE SERPL-SCNC: 102 MMOL/L (ref 98–107)
CO2 SERPL-SCNC: 25 MMOL/L (ref 22–29)
CREAT SERPL-MCNC: 2.2 MG/DL (ref 0.7–1.2)
ERYTHROCYTE [DISTWIDTH] IN BLOOD BY AUTOMATED COUNT: 14.3 % (ref 11.5–15)
GFR SERPL CREATININE-BSD FRML MDRD: 32 ML/MIN/1.73M2
GLUCOSE BLD-MCNC: 112 MG/DL (ref 74–99)
GLUCOSE BLD-MCNC: 80 MG/DL (ref 74–99)
GLUCOSE BLD-MCNC: 81 MG/DL (ref 74–99)
GLUCOSE BLD-MCNC: 97 MG/DL (ref 74–99)
GLUCOSE SERPL-MCNC: 111 MG/DL (ref 74–99)
HCT VFR BLD AUTO: 28.7 % (ref 37–54)
HGB BLD-MCNC: 8.9 G/DL (ref 12.5–16.5)
MCH RBC QN AUTO: 24.9 PG (ref 26–35)
MCHC RBC AUTO-ENTMCNC: 31 G/DL (ref 32–34.5)
MCV RBC AUTO: 80.4 FL (ref 80–99.9)
PLATELET # BLD AUTO: 235 K/UL (ref 130–450)
PMV BLD AUTO: 11.7 FL (ref 7–12)
POTASSIUM SERPL-SCNC: 3.9 MMOL/L (ref 3.5–5)
RBC # BLD AUTO: 3.57 M/UL (ref 3.8–5.8)
SODIUM SERPL-SCNC: 138 MMOL/L (ref 132–146)
WBC OTHER # BLD: 11.5 K/UL (ref 4.5–11.5)

## 2023-10-02 PROCEDURE — 36415 COLL VENOUS BLD VENIPUNCTURE: CPT

## 2023-10-02 PROCEDURE — 2580000003 HC RX 258

## 2023-10-02 PROCEDURE — 6370000000 HC RX 637 (ALT 250 FOR IP)

## 2023-10-02 PROCEDURE — 6370000000 HC RX 637 (ALT 250 FOR IP): Performed by: STUDENT IN AN ORGANIZED HEALTH CARE EDUCATION/TRAINING PROGRAM

## 2023-10-02 PROCEDURE — 6360000002 HC RX W HCPCS

## 2023-10-02 PROCEDURE — 6370000000 HC RX 637 (ALT 250 FOR IP): Performed by: PODIATRIST

## 2023-10-02 PROCEDURE — 6370000000 HC RX 637 (ALT 250 FOR IP): Performed by: INTERNAL MEDICINE

## 2023-10-02 PROCEDURE — 5A09357 ASSISTANCE WITH RESPIRATORY VENTILATION, LESS THAN 24 CONSECUTIVE HOURS, CONTINUOUS POSITIVE AIRWAY PRESSURE: ICD-10-PCS | Performed by: INTERNAL MEDICINE

## 2023-10-02 PROCEDURE — 82962 GLUCOSE BLOOD TEST: CPT

## 2023-10-02 PROCEDURE — 1200000000 HC SEMI PRIVATE

## 2023-10-02 PROCEDURE — 80048 BASIC METABOLIC PNL TOTAL CA: CPT

## 2023-10-02 PROCEDURE — 2580000003 HC RX 258: Performed by: INTERNAL MEDICINE

## 2023-10-02 PROCEDURE — 6360000002 HC RX W HCPCS: Performed by: INTERNAL MEDICINE

## 2023-10-02 PROCEDURE — 85027 COMPLETE CBC AUTOMATED: CPT

## 2023-10-02 PROCEDURE — 94660 CPAP INITIATION&MGMT: CPT

## 2023-10-02 PROCEDURE — 6360000002 HC RX W HCPCS: Performed by: STUDENT IN AN ORGANIZED HEALTH CARE EDUCATION/TRAINING PROGRAM

## 2023-10-02 RX ADMIN — Medication 2 CAPSULE: at 09:22

## 2023-10-02 RX ADMIN — PRIMIDONE 50 MG: 50 TABLET ORAL at 20:27

## 2023-10-02 RX ADMIN — LINEZOLID 600 MG: 600 TABLET, FILM COATED ORAL at 09:22

## 2023-10-02 RX ADMIN — SODIUM CHLORIDE, PRESERVATIVE FREE 10 ML: 5 INJECTION INTRAVENOUS at 20:29

## 2023-10-02 RX ADMIN — INSULIN GLARGINE 30 UNITS: 100 INJECTION, SOLUTION SUBCUTANEOUS at 20:27

## 2023-10-02 RX ADMIN — CHLORTHALIDONE 25 MG: 25 TABLET ORAL at 09:25

## 2023-10-02 RX ADMIN — METOPROLOL TARTRATE 25 MG: 25 TABLET, FILM COATED ORAL at 20:27

## 2023-10-02 RX ADMIN — PIPERACILLIN AND TAZOBACTAM 3375 MG: 3; .375 INJECTION, POWDER, LYOPHILIZED, FOR SOLUTION INTRAVENOUS at 17:09

## 2023-10-02 RX ADMIN — PIPERACILLIN AND TAZOBACTAM 3375 MG: 3; .375 INJECTION, POWDER, LYOPHILIZED, FOR SOLUTION INTRAVENOUS at 01:07

## 2023-10-02 RX ADMIN — SODIUM CHLORIDE, PRESERVATIVE FREE 10 ML: 5 INJECTION INTRAVENOUS at 09:24

## 2023-10-02 RX ADMIN — CLONIDINE HYDROCHLORIDE 0.1 MG: 0.1 TABLET ORAL at 14:19

## 2023-10-02 RX ADMIN — CLONIDINE HYDROCHLORIDE 0.1 MG: 0.1 TABLET ORAL at 09:23

## 2023-10-02 RX ADMIN — MORPHINE SULFATE 2 MG: 2 INJECTION, SOLUTION INTRAMUSCULAR; INTRAVENOUS at 21:37

## 2023-10-02 RX ADMIN — ENOXAPARIN SODIUM 30 MG: 100 INJECTION SUBCUTANEOUS at 20:29

## 2023-10-02 RX ADMIN — DOXYCYCLINE HYCLATE 100 MG: 100 CAPSULE ORAL at 09:22

## 2023-10-02 RX ADMIN — INSULIN GLARGINE 10 UNITS: 100 INJECTION, SOLUTION SUBCUTANEOUS at 09:18

## 2023-10-02 RX ADMIN — MORPHINE SULFATE 2 MG: 2 INJECTION, SOLUTION INTRAMUSCULAR; INTRAVENOUS at 05:20

## 2023-10-02 RX ADMIN — OXYCODONE HYDROCHLORIDE 10 MG: 10 TABLET, FILM COATED, EXTENDED RELEASE ORAL at 09:22

## 2023-10-02 RX ADMIN — LINEZOLID 600 MG: 600 TABLET, FILM COATED ORAL at 20:28

## 2023-10-02 RX ADMIN — ATORVASTATIN CALCIUM 40 MG: 40 TABLET, FILM COATED ORAL at 20:28

## 2023-10-02 RX ADMIN — CLOPIDOGREL BISULFATE 75 MG: 75 TABLET ORAL at 09:21

## 2023-10-02 RX ADMIN — PIPERACILLIN AND TAZOBACTAM 3375 MG: 3; .375 INJECTION, POWDER, LYOPHILIZED, FOR SOLUTION INTRAVENOUS at 09:37

## 2023-10-02 RX ADMIN — DOXYCYCLINE HYCLATE 100 MG: 100 CAPSULE ORAL at 20:27

## 2023-10-02 RX ADMIN — HYDRALAZINE HYDROCHLORIDE 50 MG: 50 TABLET, FILM COATED ORAL at 20:28

## 2023-10-02 RX ADMIN — OXYCODONE HYDROCHLORIDE 10 MG: 10 TABLET, FILM COATED, EXTENDED RELEASE ORAL at 20:28

## 2023-10-02 RX ADMIN — METOPROLOL TARTRATE 25 MG: 25 TABLET, FILM COATED ORAL at 09:23

## 2023-10-02 RX ADMIN — CLONIDINE HYDROCHLORIDE 0.1 MG: 0.1 TABLET ORAL at 20:28

## 2023-10-02 RX ADMIN — CILOSTAZOL 50 MG: 50 TABLET ORAL at 17:06

## 2023-10-02 RX ADMIN — COLLAGENASE SANTYL: 250 OINTMENT TOPICAL at 11:00

## 2023-10-02 RX ADMIN — HYDRALAZINE HYDROCHLORIDE 50 MG: 50 TABLET, FILM COATED ORAL at 09:24

## 2023-10-02 RX ADMIN — CILOSTAZOL 50 MG: 50 TABLET ORAL at 05:17

## 2023-10-02 RX ADMIN — ENOXAPARIN SODIUM 30 MG: 100 INJECTION SUBCUTANEOUS at 09:18

## 2023-10-02 ASSESSMENT — PAIN DESCRIPTION - ORIENTATION
ORIENTATION: LEFT
ORIENTATION: LEFT
ORIENTATION: RIGHT
ORIENTATION: LEFT

## 2023-10-02 ASSESSMENT — PAIN DESCRIPTION - FREQUENCY
FREQUENCY: CONTINUOUS

## 2023-10-02 ASSESSMENT — PAIN DESCRIPTION - DESCRIPTORS
DESCRIPTORS: SHARP;STABBING
DESCRIPTORS: ACHING;BURNING;THROBBING
DESCRIPTORS: SHARP;STABBING
DESCRIPTORS: SHARP;STABBING

## 2023-10-02 ASSESSMENT — PAIN SCALES - GENERAL
PAINLEVEL_OUTOF10: 9
PAINLEVEL_OUTOF10: 9
PAINLEVEL_OUTOF10: 8
PAINLEVEL_OUTOF10: 10
PAINLEVEL_OUTOF10: 3

## 2023-10-02 ASSESSMENT — PAIN DESCRIPTION - PAIN TYPE: TYPE: ACUTE PAIN

## 2023-10-02 ASSESSMENT — PAIN DESCRIPTION - ONSET
ONSET: ON-GOING

## 2023-10-02 ASSESSMENT — PAIN DESCRIPTION - LOCATION
LOCATION: ANKLE
LOCATION: LEG
LOCATION: ANKLE
LOCATION: ANKLE

## 2023-10-02 NOTE — PROGRESS NOTES
Physician Progress Note      Jeremiah Abarca  CSN #:                  [id number]  :                       1955  ADMIT DATE:       2023 9:53 PM  [facility] DATE:  Valerie Parmar  PROVIDER #:        Xi Banks MD          QUERY TEXT:    Pt admitted with left foot wound related to DM2. Pt noted to have WBC 19.0   with heart rate over 100. If possible, please document in the progress notes   and discharge summary if you are evaluating and /or treating any of the   following: The medical record reflects the following:  Risk Factors: DM, PVD  Clinical Indicators: per H&P  left foot wound with infection likely related tp   PVD/DM, DM2; per podiatry consult Full thickness ulceration infected left   lateral ankle; ID  OM left distal fibula; VS 99.7, 79, 17, 131/94  99.2, 102,   18, 124/63, 98.6, 108, 18, 181/65  96.1, 114, 135/112; Lab findings  WBC   9.9--19.--17.0-  Treatment: ID Consult, Podiatry consult, IVF, Vibramycin, Zosyn    Thank you  Gertrude BAHENA, RN, CCDS  Clinical Documentation Improvement  Options provided:  -- Sepsis, present on admission  -- foot wound without Sepsis  -- Other - I will add my own diagnosis  -- Disagree - Not applicable / Not valid  -- Disagree - Clinically unable to determine / Unknown  -- Refer to Clinical Documentation Reviewer    PROVIDER RESPONSE TEXT:    This patient has foot wound without Sepsis.     Query created by: Gertrude Hatch on  31:18 AM      Electronically signed by:  Xi Banks MD 10/2/2023 2:31 PM

## 2023-10-02 NOTE — CARE COORDINATION
10/2. Currently on IV zosyn. Per ID, will need I & D, d/t copious drainage. Referral made to [facility] care. Will follow.  Mars Ambrosio RN

## 2023-10-02 NOTE — PROGRESS NOTES
[facility] PROGRESS  NOTE    Covering for Dr Xavier Stanford    C/C : left leg wound infection       Pt denies fever or chills  Left leg pain   Afebrile       Current Facility-Administered Medications   Medication Dose Route Frequency Provider Last Rate Last Admin    nicotine (NICODERM CQ) 21 MG/24HR 1 patch  1 patch TransDERmal Daily Emily Bertrand MD   1 patch at 10/02/23 0920    linezolid (ZYVOX) tablet 600 mg  600 mg Oral 2 times per day Dennis Llanes MD   600 mg at 10/02/23 7486    oxyCODONE-acetaminophen (PERCOCET) 5-325 MG per tablet 1 tablet  1 tablet Oral Q4H PRN Raymundo Maze, APRN - CNP   1 tablet at 10/01/23 0547    0.9 % sodium chloride infusion   IntraVENous Continuous Raymundo Maze, APRN - CNP 50 mL/hr at 10/01/23 1228 New Bag at 10/01/23 1228    clopidogrel (PLAVIX) tablet 75 mg  75 mg Oral Daily SHONDA Pichardo - CNP   75 mg at 10/02/23 8620    doxycycline hyclate (VIBRAMYCIN) capsule 100 mg  100 mg Oral BID mEily Bertrand MD   100 mg at 10/02/23 8263    morphine (PF) injection 2 mg  2 mg IntraVENous Q3H PRN Emily Bertrand MD   2 mg at 10/02/23 0520    naloxone [facility]) injection 0.4 mg  0.4 mg IntraVENous PRN Emily Bertrand MD        atorvastatin (LIPITOR) tablet 40 mg  40 mg Oral Nightly Emily Bertrand MD   40 mg at 10/01/23 2002    piperacillin-tazobactam (ZOSYN) 3,375 mg in sodium chloride 0.9 % 50 mL IVPB ([id number])  3,375 mg IntraVENous Q8H Tracie Kelly MD 12.5 mL/hr at 10/02/23 0937 3,375 mg at 10/02/23 0937    0.9 % sodium chloride infusion   IntraVENous PRN Raymundo Domingo, APRN - CNP        acetaminophen (TYLENOL) tablet 650 mg  650 mg Oral Q6H PRN Raymundo Maze, APRN - CNP        Or    acetaminophen (TYLENOL) suppository 650 mg  650 mg Rectal Q6H PRN Raymundo Maze, APRN - CNP        chlorthalidone (HYGROTON) tablet 25 mg  25 mg Oral Daily Raymundo Maze, APRN - CNP   25 mg at 10/02/23 0925    cilostazol (PLETAL) tablet 50 mg  50 mg Oral BID PINO HEDRICK the distal fibular shaft,  metaphysis and lateral malleolus. Motion degrades the quality of the exam.     Diffuse nonspecific myositis. Diffuse nonspecific subcutaneous edema. Microbiology:   Culture, Wound Aerobic Only  Order: [id number]  Status: Final result     Visible to patient: No (not released)     Next appt: 12/11/2023 at 01:00 PM in Infectious Diseases SHONDA Olsen - CNP)     Specimen Information: Leg   0 Result Notes      Component    Specimen Description . LEG LEFT    Direct Exam Swab collections are low-yield and rarely indicated. Generally, the specimen volume when collected by swab is small, reducing the probability of isolating organisms: many organisms adhere to the fibers of the swab, which reduces the opportunity or recovering organisms. Interpret results with caution. RARE Polymorphonuclear leukocytes Abnormal      RARE EPITHELIAL CELLS Abnormal      FEW GRAM POSITIVE COCCI Abnormal      RARE GRAM VARIABLE RODS Abnormal     Culture  Abnormal   Mixed radha isolated. Further workup and sensitivity testing not routinely indicated and will not be perfomed. Mixed radha isolated includes:    PROTEUS SPECIES HEAVY GROWTH Abnormal      GRAM POSITIVE RODS RESEMBLING DIPHTHEROIDS HEAVY GROWTH Abnormal      STREPTOCOCCUS SPECIES LIGHT GROWTH Abnormal      STREPTOCOCCI, BETA HEMOLYTIC GROUP G LIGHT GROWTH Abnormal        Presumptive anaerobes isolated, verification to follow. Abnormal     Assessment:  Nonhealing wound with abnormal MRI findings of osteomyelitis, left distal fibula  Leukocytosis     Plan:  Piperacillin-tazobactam 3.375g q8h.   Linezolid 600 mg po q 12 hrs   Hold doxycycline for now    Needs I & D - copious drainage   CBC with diff         Electronically signed by Pauline Hernandez MD on 10/2/2023 at 10:34 AM

## 2023-10-02 NOTE — PLAN OF CARE

## 2023-10-02 NOTE — PROGRESS NOTES
Podiatry Progress Note  10/2/2023   Juan Jalka Santana       SUBJECTIVE: Patient being seen for follow up of left ankle wound. Patient does not talk much today. He just nods his head. Does grimace when leg is moved on to pillow.      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[address]) 01/16/2020    Acute kidney injury ([address]) 05/18/2020    AKA stump complication ([address]) 55/63/4666    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 ([address]) 05/22/2018    Atherosclerosis of native artery of left leg with ulceration of ankle ([address]) 9/29/2023    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 ([address]) 05/21/2018    Cellulitis, scrotum 03/20/2020    Coagulopathy ([address]) 05/21/2018    Critical lower limb ischemia ([address]) 03/20/2020    Diabetes mellitus ([address])     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 2 diabetes mellitus, with fat layer exposed ([address]) 05/22/2018    Disruption of closure of muscle or muscle flap, sequela 08/26/2020    DVT, lower extremity ([address])     right leg     Encounter for dental examination and cleaning with abnormal findings 04/02/2018    Gas gangrene of thigh ([address]) 03/20/2020    History of DVT (deep vein thrombosis) 07/31/2018    Hyperglycemia due to type 2 diabetes mellitus ([address]) 03/20/2020    Hyperlipidemia     Hypertension     Ischemic ulcer of right thigh with fat layer exposed ([address])     Ischemic ulcer of right thigh with necrosis of muscle (HCC)     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[address])     Moderate protein-calorie malnutrition ([address]) 05/23/2018    Occlusion of common femoral artery ([address]) 03/20/2020    RAGHU (obstructive sleep apnea)     Osteomyelitis ([address]) 10/24/2018    Osteomyelitis of right lower limb ([address]) 10/24/2018    Pain syndrome, chronic     Postoperative wound infection     PVD (peripheral vascular disease) (HCC)     Tobacco dependence     Vascular occlusion     Vitamin D deficiency     Wound infection tablet 16 g  16 g Oral PRN [facility], APRN - CNP        hydrALAZINE (APRESOLINE) tablet 50 mg  50 mg Oral BID [facility], APRN - CNP   50 mg at 10/02/23 0924    insulin glargine (LANTUS) injection vial 10 Units  10 Units SubCUTAneous QAM [facility], APRN - CNP   10 Units at 10/02/23 7813    insulin glargine (LANTUS) injection vial 30 Units  30 Units SubCUTAneous Nightly [facility] Stephanie, APRN - CNP   30 Units at 10/01/23 2002    insulin lispro (HUMALOG) injection vial 0-4 Units  0-4 Units SubCUTAneous Nightly Shailesh Hermosillo APRN - CNP        insulin lispro (HUMALOG) injection vial 0-8 Units  0-8 Units SubCUTAneous TID WC [facility] Stephanie, APRN - CNP   2 Units at 09/30/23 1244    lactobacillus (CULTURELLE) capsule 2 capsule  2 capsule Oral Daily SHONDA Craig CNP   2 capsule at 10/02/23 3261    magnesium sulfate 2000 mg in 50 mL IVPB premix  2,000 mg IntraVENous PRN [facility], APRN - CNP        melatonin tablet 3 mg  3 mg Oral Nightly PRN [facility], APRN - KVNG        metoprolol tartrate (LOPRESSOR) tablet 25 mg  25 mg Oral BID [facility], APRN - CNP   25 mg at 10/02/23 9598    [Held by provider] mirtazapine (REMERON) tablet 7.5 mg  7.5 mg Oral Nightly [facility], APRN - CNP   7.5 mg at 09/29/23 2031    ondansetron (ZOFRAN-ODT) disintegrating tablet 4 mg  4 mg Oral Q8H PRN [facility], APRN - KVNG        Or    ondansetron (ZOFRAN) injection 4 mg  4 mg IntraVENous Q6H PRN [facility] Stephanie, APRN - CNP        oxyCODONE (OXYCONTIN) extended release tablet 10 mg  10 mg Oral Q12H SHONDA Craig - CNP   10 mg at 10/02/23 0166    potassium chloride (KLOR-CON M) extended release tablet 40 mEq  40 mEq Oral PRN [facility], APRN - KVNG        Or    potassium bicarb-citric acid (EFFER-K) effervescent tablet 40 mEq  40 mEq Oral PRN [facility] Stephanie, APRN - CNP        Or    potassium chloride 10 mEq/100 mL IVPB (Peripheral Line)  10 mEq IntraVENous PRN [facility] Stephanie, APRN - CNP        primidone (MYSOLINE)

## 2023-10-03 PROBLEM — M86.9: Status: ACTIVE | Noted: 2023-09-28

## 2023-10-03 LAB
25(OH)D3 SERPL-MCNC: 25.1 NG/ML (ref 30–100)
ANION GAP SERPL CALCULATED.3IONS-SCNC: 13 MMOL/L (ref 7–16)
BNP SERPL-MCNC: 1871 PG/ML (ref 0–125)
BUN SERPL-MCNC: 27 MG/DL (ref 6–23)
CALCIUM SERPL-MCNC: 9 MG/DL (ref 8.6–10.2)
CHLORIDE SERPL-SCNC: 104 MMOL/L (ref 98–107)
CO2 SERPL-SCNC: 24 MMOL/L (ref 22–29)
CREAT SERPL-MCNC: 2.1 MG/DL (ref 0.7–1.2)
ERYTHROCYTE [DISTWIDTH] IN BLOOD BY AUTOMATED COUNT: 14.5 % (ref 11.5–15)
GFR SERPL CREATININE-BSD FRML MDRD: 34 ML/MIN/1.73M2
GLUCOSE BLD-MCNC: 122 MG/DL (ref 74–99)
GLUCOSE BLD-MCNC: 150 MG/DL (ref 74–99)
GLUCOSE BLD-MCNC: 69 MG/DL (ref 74–99)
GLUCOSE BLD-MCNC: 71 MG/DL (ref 74–99)
GLUCOSE BLD-MCNC: 91 MG/DL (ref 74–99)
GLUCOSE SERPL-MCNC: 60 MG/DL (ref 74–99)
HCT VFR BLD AUTO: 28.4 % (ref 37–54)
HGB BLD-MCNC: 8.7 G/DL (ref 12.5–16.5)
MCH RBC QN AUTO: 24.8 PG (ref 26–35)
MCHC RBC AUTO-ENTMCNC: 30.6 G/DL (ref 32–34.5)
MCV RBC AUTO: 80.9 FL (ref 80–99.9)
PLATELET # BLD AUTO: 278 K/UL (ref 130–450)
PMV BLD AUTO: 11.4 FL (ref 7–12)
POTASSIUM SERPL-SCNC: 3.9 MMOL/L (ref 3.5–5)
RBC # BLD AUTO: 3.51 M/UL (ref 3.8–5.8)
SODIUM SERPL-SCNC: 141 MMOL/L (ref 132–146)
WBC OTHER # BLD: 10.9 K/UL (ref 4.5–11.5)

## 2023-10-03 PROCEDURE — 83880 ASSAY OF NATRIURETIC PEPTIDE: CPT

## 2023-10-03 PROCEDURE — 1200000000 HC SEMI PRIVATE

## 2023-10-03 PROCEDURE — 6370000000 HC RX 637 (ALT 250 FOR IP): Performed by: STUDENT IN AN ORGANIZED HEALTH CARE EDUCATION/TRAINING PROGRAM

## 2023-10-03 PROCEDURE — 36415 COLL VENOUS BLD VENIPUNCTURE: CPT

## 2023-10-03 PROCEDURE — 6370000000 HC RX 637 (ALT 250 FOR IP): Performed by: INTERNAL MEDICINE

## 2023-10-03 PROCEDURE — 6360000002 HC RX W HCPCS

## 2023-10-03 PROCEDURE — 2580000003 HC RX 258

## 2023-10-03 PROCEDURE — 6370000000 HC RX 637 (ALT 250 FOR IP)

## 2023-10-03 PROCEDURE — 6360000002 HC RX W HCPCS: Performed by: STUDENT IN AN ORGANIZED HEALTH CARE EDUCATION/TRAINING PROGRAM

## 2023-10-03 PROCEDURE — 85027 COMPLETE CBC AUTOMATED: CPT

## 2023-10-03 PROCEDURE — 6360000002 HC RX W HCPCS: Performed by: INTERNAL MEDICINE

## 2023-10-03 PROCEDURE — 80048 BASIC METABOLIC PNL TOTAL CA: CPT

## 2023-10-03 PROCEDURE — 2580000003 HC RX 258: Performed by: INTERNAL MEDICINE

## 2023-10-03 PROCEDURE — 82962 GLUCOSE BLOOD TEST: CPT

## 2023-10-03 PROCEDURE — 82306 VITAMIN D 25 HYDROXY: CPT

## 2023-10-03 RX ORDER — LIDOCAINE HYDROCHLORIDE 10 MG/ML
5 INJECTION, SOLUTION EPIDURAL; INFILTRATION; INTRACAUDAL; PERINEURAL ONCE
Status: DISCONTINUED | OUTPATIENT
Start: 2023-10-03 | End: 2023-10-06 | Stop reason: HOSPADM

## 2023-10-03 RX ORDER — SODIUM CHLORIDE 0.9 % (FLUSH) 0.9 %
5-40 SYRINGE (ML) INJECTION EVERY 12 HOURS SCHEDULED
Status: DISCONTINUED | OUTPATIENT
Start: 2023-10-03 | End: 2023-10-06 | Stop reason: HOSPADM

## 2023-10-03 RX ORDER — ERGOCALCIFEROL 1.25 MG/1
50000 CAPSULE ORAL WEEKLY
Status: DISCONTINUED | OUTPATIENT
Start: 2023-10-03 | End: 2023-10-06 | Stop reason: HOSPADM

## 2023-10-03 RX ORDER — SODIUM CHLORIDE 0.9 % (FLUSH) 0.9 %
5-40 SYRINGE (ML) INJECTION PRN
Status: DISCONTINUED | OUTPATIENT
Start: 2023-10-03 | End: 2023-10-06 | Stop reason: HOSPADM

## 2023-10-03 RX ORDER — HEPARIN 100 UNIT/ML
3 SYRINGE INTRAVENOUS PRN
Status: DISCONTINUED | OUTPATIENT
Start: 2023-10-03 | End: 2023-10-06 | Stop reason: HOSPADM

## 2023-10-03 RX ORDER — SODIUM CHLORIDE 9 MG/ML
INJECTION, SOLUTION INTRAVENOUS PRN
Status: DISCONTINUED | OUTPATIENT
Start: 2023-10-03 | End: 2023-10-06 | Stop reason: HOSPADM

## 2023-10-03 RX ORDER — HEPARIN 100 UNIT/ML
3 SYRINGE INTRAVENOUS EVERY 12 HOURS SCHEDULED
Status: DISCONTINUED | OUTPATIENT
Start: 2023-10-03 | End: 2023-10-06 | Stop reason: HOSPADM

## 2023-10-03 RX ADMIN — CLONIDINE HYDROCHLORIDE 0.1 MG: 0.1 TABLET ORAL at 09:02

## 2023-10-03 RX ADMIN — CLOPIDOGREL BISULFATE 75 MG: 75 TABLET ORAL at 09:00

## 2023-10-03 RX ADMIN — METOPROLOL TARTRATE 25 MG: 25 TABLET, FILM COATED ORAL at 21:15

## 2023-10-03 RX ADMIN — PRIMIDONE 50 MG: 50 TABLET ORAL at 21:15

## 2023-10-03 RX ADMIN — OXYCODONE HYDROCHLORIDE 10 MG: 10 TABLET, FILM COATED, EXTENDED RELEASE ORAL at 21:15

## 2023-10-03 RX ADMIN — HYDRALAZINE HYDROCHLORIDE 50 MG: 50 TABLET, FILM COATED ORAL at 09:00

## 2023-10-03 RX ADMIN — LINEZOLID 600 MG: 600 TABLET, FILM COATED ORAL at 09:00

## 2023-10-03 RX ADMIN — DOXYCYCLINE HYCLATE 100 MG: 100 CAPSULE ORAL at 09:00

## 2023-10-03 RX ADMIN — CILOSTAZOL 50 MG: 50 TABLET ORAL at 05:33

## 2023-10-03 RX ADMIN — CLONIDINE HYDROCHLORIDE 0.1 MG: 0.1 TABLET ORAL at 21:15

## 2023-10-03 RX ADMIN — Medication 2 CAPSULE: at 08:59

## 2023-10-03 RX ADMIN — DOXYCYCLINE HYCLATE 100 MG: 100 CAPSULE ORAL at 21:15

## 2023-10-03 RX ADMIN — LINEZOLID 600 MG: 600 TABLET, FILM COATED ORAL at 21:15

## 2023-10-03 RX ADMIN — SODIUM CHLORIDE, PRESERVATIVE FREE 10 ML: 5 INJECTION INTRAVENOUS at 21:15

## 2023-10-03 RX ADMIN — COLLAGENASE SANTYL: 250 OINTMENT TOPICAL at 09:08

## 2023-10-03 RX ADMIN — ERGOCALCIFEROL 50000 UNITS: 1.25 CAPSULE ORAL at 12:02

## 2023-10-03 RX ADMIN — PIPERACILLIN AND TAZOBACTAM 3375 MG: 3; .375 INJECTION, POWDER, LYOPHILIZED, FOR SOLUTION INTRAVENOUS at 17:30

## 2023-10-03 RX ADMIN — INSULIN GLARGINE 10 UNITS: 100 INJECTION, SOLUTION SUBCUTANEOUS at 08:57

## 2023-10-03 RX ADMIN — HYDRALAZINE HYDROCHLORIDE 50 MG: 50 TABLET, FILM COATED ORAL at 21:15

## 2023-10-03 RX ADMIN — ATORVASTATIN CALCIUM 40 MG: 40 TABLET, FILM COATED ORAL at 21:15

## 2023-10-03 RX ADMIN — ENOXAPARIN SODIUM 30 MG: 100 INJECTION SUBCUTANEOUS at 21:14

## 2023-10-03 RX ADMIN — CHLORTHALIDONE 25 MG: 25 TABLET ORAL at 09:03

## 2023-10-03 RX ADMIN — ENOXAPARIN SODIUM 30 MG: 100 INJECTION SUBCUTANEOUS at 08:56

## 2023-10-03 RX ADMIN — CLONIDINE HYDROCHLORIDE 0.1 MG: 0.1 TABLET ORAL at 14:27

## 2023-10-03 RX ADMIN — OXYCODONE HYDROCHLORIDE 10 MG: 10 TABLET, FILM COATED, EXTENDED RELEASE ORAL at 09:01

## 2023-10-03 RX ADMIN — PIPERACILLIN AND TAZOBACTAM 3375 MG: 3; .375 INJECTION, POWDER, LYOPHILIZED, FOR SOLUTION INTRAVENOUS at 01:20

## 2023-10-03 RX ADMIN — CILOSTAZOL 50 MG: 50 TABLET ORAL at 17:29

## 2023-10-03 RX ADMIN — PIPERACILLIN AND TAZOBACTAM 3375 MG: 3; .375 INJECTION, POWDER, LYOPHILIZED, FOR SOLUTION INTRAVENOUS at 09:07

## 2023-10-03 RX ADMIN — MORPHINE SULFATE 2 MG: 2 INJECTION, SOLUTION INTRAMUSCULAR; INTRAVENOUS at 14:31

## 2023-10-03 RX ADMIN — INSULIN GLARGINE 30 UNITS: 100 INJECTION, SOLUTION SUBCUTANEOUS at 21:15

## 2023-10-03 RX ADMIN — METOPROLOL TARTRATE 25 MG: 25 TABLET, FILM COATED ORAL at 09:01

## 2023-10-03 RX ADMIN — SODIUM CHLORIDE, PRESERVATIVE FREE 10 ML: 5 INJECTION INTRAVENOUS at 09:05

## 2023-10-03 ASSESSMENT — PAIN DESCRIPTION - LOCATION
LOCATION: LEG;FOOT
LOCATION: LEG;FOOT
LOCATION: FOOT

## 2023-10-03 ASSESSMENT — PAIN SCALES - GENERAL
PAINLEVEL_OUTOF10: 2
PAINLEVEL_OUTOF10: 2
PAINLEVEL_OUTOF10: 9
PAINLEVEL_OUTOF10: 7
PAINLEVEL_OUTOF10: 10
PAINLEVEL_OUTOF10: 3

## 2023-10-03 ASSESSMENT — PAIN DESCRIPTION - ORIENTATION
ORIENTATION: LEFT

## 2023-10-03 ASSESSMENT — PAIN DESCRIPTION - DESCRIPTORS
DESCRIPTORS: ACHING
DESCRIPTORS: ACHING;BURNING;THROBBING
DESCRIPTORS: ACHING;BURNING;THROBBING

## 2023-10-03 ASSESSMENT — PAIN DESCRIPTION - FREQUENCY: FREQUENCY: CONTINUOUS

## 2023-10-03 ASSESSMENT — PAIN DESCRIPTION - PAIN TYPE: TYPE: ACUTE PAIN

## 2023-10-03 ASSESSMENT — PAIN DESCRIPTION - ONSET: ONSET: ON-GOING

## 2023-10-03 NOTE — ACP (ADVANCE CARE PLANNING)
Advance Care Planning     General Advance Care Planning (ACP) Conversation    Date of Conversation: 9/26/2023  Conducted with: Patient with Decision Making Capacity    Healthcare Decision Maker:    Primary Decision Maker: [facility] Brother/Sister - [phone]  Click here to complete Healthcare Decision Makers including selection of the Healthcare Decision Maker Relationship (ie \"Primary\"). Today we documented Decision Maker(s) consistent with Legal Next of Kin hierarchy. Content/Action Overview:   Has ACP document(s) on file - reflects the patient's care preferences  Reviewed DNR/DNI and patient elects Full Code (Attempt Resuscitation)        Length of Voluntary ACP Conversation in minutes:  <16 minutes (Non-Billable)    Tyrel Flynn RN

## 2023-10-03 NOTE — FLOWSHEET NOTE
Inpatient Wound Care (Initial consult) 5402B    Admit Date: 9/28/2023  9:53 PM    Reason for consult:  Left lower leg    Significant history:   Per H&P      Chief Complaint: left leg wound     History Of Present Illness: 80-year old male patient with type 2 diabetes, hypertension, PVD, smoking, hyperlipidemia who came in as a transfer from [place] for vascular surgery evaluation given his right lower extremity nonhealing wound. Patient is seen at bedside today after he came from [facility]. He endorses severe pain in the right lower extremity in the wound area, feeling cold like having a fever. Denies chest pain, abdominal pain, nausea or vomiting  Records from the other hospital reviewed       Findings:     10/03/23 1003   Skin Integumentary    Skin Condition/Temp Dry;Flaky   Skin Integrity   (dry flaky)   Location LLE and foot         Impression:  Skin assessment complete; see above documentation    Plan: [facility]  Podiatry following for left lower leg wound  Patient will need continued preventative care.       Evelina Campbell RN 10/3/2023 10:06 AM

## 2023-10-03 NOTE — PROGRESS NOTES
Date: 10/2/2023    Time: 10:32 PM    Patient Placed On BIPAP/CPAP/ Non-Invasive Ventilation? Yes    If no must comment. Facial area red/color change? No           If YES are Blister/Lesion present? No   If yes must notify nursing staff  BIPAP/CPAP skin barrier? Yes    Skin barrier type:mepilexlite       Comments:  Patient unable to bring in own CPAP unit this evening. Patient is being supplied a hospital unit this evening. Patient is unable to provide his settings.         Jameson Desir RCP    10/02/23 0961   NIV Type   $NIV $Daily Charge   NIV Started/Stopped On   Equipment Type [id number]   Mode CPAP   Mask Type Full face mask   Mask Size Large   Assessment   Pulse 76   Respirations 12   SpO2 96 %   Level of Consciousness 0   Comfort Level Good   Using Accessory Muscles No   Mask Compliance Good   Skin Assessment Clean, dry, & intact   Skin Protection for O2 Device Yes   Orientation Middle   Location Nose   Settings/Measurements   PIP Observed 10 cm H20   CPAP/EPAP 10 cmH2O   Vt (Measured) 742 mL   FiO2  50 %   Minute Volume (L/min) 10.6 Liters   Mask Leak (lpm) 58 lpm   Patient's Home Machine No   Alarm Settings   Alarms On Y   Low Pressure (cmH2O) 8 cmH2O   High Pressure (cmH2O) 22 cmH2O   RR Low (bpm) 10   RR High (bpm) 30 br/min

## 2023-10-03 NOTE — CARE COORDINATION
10/3. Discharge plan is home with [facility]. The pt indicated to the liaison from [facility] that he is not willing to receive IV antibiotics at home, not is he willing to go to the infusion center. He also does not want to go to rehab.  Mari Ware RN

## 2023-10-03 NOTE — PROGRESS NOTES
Podiatry Progress Note  10/3/2023   Jay Soriano       SUBJECTIVE: Patient being seen for follow up of left ankle wound. Patient a little more responsive today but still relates pain to his leg. Bedside debridement performed today to patient tolerance.      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[address]) 01/16/2020    Acute kidney injury ([address]) 05/18/2020    AKA stump complication ([address]) 07/29/9696    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 ([address]) 05/22/2018    Atherosclerosis of native artery of left leg with ulceration of ankle ([address]) 9/29/2023    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 ([address]) 05/21/2018    Cellulitis, scrotum 03/20/2020    Coagulopathy ([address]) 05/21/2018    Critical lower limb ischemia ([address]) 03/20/2020    Diabetes mellitus ([address])     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 2 diabetes mellitus, with fat layer exposed ([address]) 05/22/2018    Disruption of closure of muscle or muscle flap, sequela 08/26/2020    DVT, lower extremity ([address])     right leg     Encounter for dental examination and cleaning with abnormal findings 04/02/2018    Gas gangrene of thigh ([address]) 03/20/2020    History of DVT (deep vein thrombosis) 07/31/2018    Hyperglycemia due to type 2 diabetes mellitus ([address]) 03/20/2020    Hyperlipidemia     Hypertension     Ischemic ulcer of right thigh with fat layer exposed ([address])     Ischemic ulcer of right thigh with necrosis of muscle (HCC)     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[address])     Moderate protein-calorie malnutrition ([address]) 05/23/2018    Occlusion of common femoral artery ([address]) 03/20/2020    RAGHU (obstructive sleep apnea)     Osteomyelitis ([address]) 10/24/2018    Osteomyelitis of right lower limb ([address]) 10/24/2018    Pain syndrome, chronic     Postoperative wound infection     PVD (peripheral vascular disease) (HCC)     Tobacco dependence     Vascular occlusion     Vitamin D 0900    oxyCODONE-acetaminophen (PERCOCET) 5-325 MG per tablet 1 tablet  1 tablet Oral Q4H PRN SHONDA Crouch - CNP   1 tablet at 10/01/23 0547    clopidogrel (PLAVIX) tablet 75 mg  75 mg Oral Daily Ryan George APRN - CNP   75 mg at 10/03/23 0900    doxycycline hyclate (VIBRAMYCIN) capsule 100 mg  100 mg Oral BID Juve Starr MD   100 mg at 10/03/23 0900    morphine (PF) injection 2 mg  2 mg IntraVENous Q3H PRN Juve Starr MD   2 mg at 10/02/23 2137    naloxone [facility]) injection 0.4 mg  0.4 mg IntraVENous PRN Juve Starr MD        atorvastatin (LIPITOR) tablet 40 mg  40 mg Oral Nightly Juve Starr MD   40 mg at 10/02/23 2028    piperacillin-tazobactam (ZOSYN) 3,375 mg in sodium chloride 0.9 % 50 mL IVPB ([id number])  3,375 mg IntraVENous Q8H Keke Coe MD 12.5 mL/hr at 10/03/23 0907 3,375 mg at 10/03/23 0907    0.9 % sodium chloride infusion   IntraVENous PRN SHONDA Crouch CNP        acetaminophen (TYLENOL) tablet 650 mg  650 mg Oral Q6H PRN SHONDA Crouch - CNP        Or    acetaminophen (TYLENOL) suppository 650 mg  650 mg Rectal Q6H PRN Ryan George APRN - CNP        chlorthalidone (HYGROTON) tablet 25 mg  25 mg Oral Daily SHONDA Crouch - CNP   25 mg at 10/03/23 6112    cilostazol (PLETAL) tablet 50 mg  50 mg Oral BID AC SHONDA Craig CNP   50 mg at 10/03/23 0533    cloNIDine (CATAPRES) tablet 0.1 mg  0.1 mg Oral TID Adrian Ramsay MD   0.1 mg at 10/03/23 0902    dextrose 10 % infusion   IntraVENous Continuous PRN SHONDA Craig CNP        dextrose bolus 10% 125 mL  125 mL IntraVENous PRN SHONDA Craig CNP        Or    dextrose bolus 10% 250 mL  250 mL IntraVENous PRN Yonatan Camuso, APRN - CNP        [Held by provider] DULoxetine (CYMBALTA) extended release capsule 60 mg  60 mg Oral Daily SHONDA Craig - CNP   60 mg at 09/30/23 0905    enoxaparin Sodium (LOVENOX) injection 30 mg  30 mg SubCUTAneous BID

## 2023-10-03 NOTE — PROGRESS NOTES
Meds:.white petrolatum, oxyCODONE-acetaminophen, morphine, naloxone, sodium chloride, acetaminophen **OR** acetaminophen, dextrose, dextrose bolus **OR** dextrose bolus, glucagon (rDNA), glucose, magnesium sulfate, melatonin, ondansetron **OR** ondansetron, potassium chloride **OR** potassium alternative oral replacement **OR** potassium chloride, senna, sodium chloride flush     DATA:    CBC:   Lab Results   Component Value Date/Time    WBC 10.9 10/03/2023 04:19 AM    RBC 3.51 10/03/2023 04:19 AM    HGB 8.7 10/03/2023 04:19 AM    HCT 28.4 10/03/2023 04:19 AM    MCV 80.9 10/03/2023 04:19 AM    MCH 24.8 10/03/2023 04:19 AM    MCHC 30.6 10/03/2023 04:19 AM    RDW 14.5 10/03/2023 04:19 AM     10/03/2023 04:19 AM    MPV 11.4 10/03/2023 04:19 AM     CMP:    Lab Results   Component Value Date/Time     10/03/2023 04:19 AM    K 3.9 10/03/2023 04:19 AM    K 4.3 05/12/2021 02:24 PM     10/03/2023 04:19 AM    CO2 24 10/03/2023 04:19 AM    BUN 27 10/03/2023 04:19 AM    CREATININE 2.1 10/03/2023 04:19 AM    GFRAA 49 08/18/2022 05:22 PM    LABGLOM 34 10/03/2023 04:19 AM    GLUCOSE 60 10/03/2023 04:19 AM    PROT 7.2 09/29/2023 04:48 AM    LABALBU 2.9 09/29/2023 04:48 AM    CALCIUM 9.0 10/03/2023 04:19 AM    BILITOT 0.4 09/29/2023 04:48 AM    ALKPHOS 123 09/29/2023 04:48 AM    AST 30 09/29/2023 04:48 AM    ALT 39 09/29/2023 04:48 AM     Magnesium:    Lab Results   Component Value Date/Time    MG 1.9 06/03/2020 03:24 AM     Phosphorus:    Lab Results   Component Value Date/Time    PHOS 4.2 06/03/2020 03:24 AM     Radiology Review:    XR CHEST PORTABLE  10/1/23  IMPRESSION:  Cardiomegaly with pulmonary edema suggestive of congestive heart failure.         BRIEF SUMMARY OF INITIAL CONSULT:    Briefly, Mr. Juan J Santana is 74year old male with a past medical history of CKD 3b with large proteinuria 2/2 diabetic nephropathy, HTN, type II DM, DVT, total right leg amputation, HLD, RAGHU, PVD, Vitamin D deficiency, who was admitted on 9/25/23 for a wound check of the left leg and transferred to [facility] on 9/28/23 for vascular consult. On admission creatinine was 1.8 mg/dL and increased to 2.4 mg/dl, reason for this consultation. Patient states he has had diarrhea on and off for 4 weeks.     IMPRESSION/RECOMMENDATIONS:      COLE stage 1 on CKD, most likely 2/2 contrast induced nephropathy following angiogram, baseline creatine 1.6-1.8 mg/dL, creatinine stable at 2.1 mg/dL, obtain bladder scan    CKD stage 3b with proteinuria 2/2 nephrosclerosis and diabetic nephropathy, baseline creatinine 1.6-1.8 mg/dL, UPCR 1.20  HTN, clonidine, hydralazine, and metoprolol  ------------------------------------------------------------  Type II DM, on SSI  Hx of DVT, on cilostazol and enoxaparin, vascular surgery following   HLD, on atorvastatin  RAGHU  PVD, vascular surgery following, s/p angiogram 9/29/23  Vitamin D deficiency, obtain vitamin D 25 level  Left leg wound infection, on zosyn and linezolid, ID following    Plan:    Obtain bladder scan  Continue clonidine 0.1 mg PO TID, hydralazine 50 mg PO BID, metoprolol 25 mg PO daily  Continue to monitor renal function  Continue to monitor blood pressure  Strict intake and output      Electronically signed by SHONDA De León CNP on 10/3/2023 at 11:59 AM

## 2023-10-04 ENCOUNTER — APPOINTMENT (OUTPATIENT)
Dept: GENERAL RADIOLOGY | Age: 68
End: 2023-10-04
Attending: STUDENT IN AN ORGANIZED HEALTH CARE EDUCATION/TRAINING PROGRAM
Payer: MEDICARE

## 2023-10-04 LAB
ANION GAP SERPL CALCULATED.3IONS-SCNC: 9 MMOL/L (ref 7–16)
BUN SERPL-MCNC: 23 MG/DL (ref 6–23)
CALCIUM SERPL-MCNC: 9.2 MG/DL (ref 8.6–10.2)
CHLORIDE SERPL-SCNC: 105 MMOL/L (ref 98–107)
CO2 SERPL-SCNC: 26 MMOL/L (ref 22–29)
CREAT SERPL-MCNC: 2 MG/DL (ref 0.7–1.2)
ERYTHROCYTE [DISTWIDTH] IN BLOOD BY AUTOMATED COUNT: 14.5 % (ref 11.5–15)
GFR SERPL CREATININE-BSD FRML MDRD: 36 ML/MIN/1.73M2
GLUCOSE BLD-MCNC: 113 MG/DL (ref 74–99)
GLUCOSE BLD-MCNC: 139 MG/DL (ref 74–99)
GLUCOSE BLD-MCNC: 156 MG/DL (ref 74–99)
GLUCOSE BLD-MCNC: 68 MG/DL (ref 74–99)
GLUCOSE SERPL-MCNC: 65 MG/DL (ref 74–99)
HCT VFR BLD AUTO: 29.2 % (ref 37–54)
HGB BLD-MCNC: 9 G/DL (ref 12.5–16.5)
MCH RBC QN AUTO: 24.9 PG (ref 26–35)
MCHC RBC AUTO-ENTMCNC: 30.8 G/DL (ref 32–34.5)
MCV RBC AUTO: 80.9 FL (ref 80–99.9)
PLATELET # BLD AUTO: 288 K/UL (ref 130–450)
PMV BLD AUTO: 11.1 FL (ref 7–12)
POTASSIUM SERPL-SCNC: 3.9 MMOL/L (ref 3.5–5)
RBC # BLD AUTO: 3.61 M/UL (ref 3.8–5.8)
SODIUM SERPL-SCNC: 140 MMOL/L (ref 132–146)
WBC OTHER # BLD: 10.6 K/UL (ref 4.5–11.5)

## 2023-10-04 PROCEDURE — C1751 CATH, INF, PER/CENT/MIDLINE: HCPCS

## 2023-10-04 PROCEDURE — 6370000000 HC RX 637 (ALT 250 FOR IP): Performed by: STUDENT IN AN ORGANIZED HEALTH CARE EDUCATION/TRAINING PROGRAM

## 2023-10-04 PROCEDURE — 2580000003 HC RX 258

## 2023-10-04 PROCEDURE — 76937 US GUIDE VASCULAR ACCESS: CPT

## 2023-10-04 PROCEDURE — 82962 GLUCOSE BLOOD TEST: CPT

## 2023-10-04 PROCEDURE — 6360000002 HC RX W HCPCS: Performed by: INTERNAL MEDICINE

## 2023-10-04 PROCEDURE — 2580000003 HC RX 258: Performed by: INTERNAL MEDICINE

## 2023-10-04 PROCEDURE — 36569 INSJ PICC 5 YR+ W/O IMAGING: CPT

## 2023-10-04 PROCEDURE — 85027 COMPLETE CBC AUTOMATED: CPT

## 2023-10-04 PROCEDURE — 36415 COLL VENOUS BLD VENIPUNCTURE: CPT

## 2023-10-04 PROCEDURE — 6370000000 HC RX 637 (ALT 250 FOR IP): Performed by: INTERNAL MEDICINE

## 2023-10-04 PROCEDURE — 94660 CPAP INITIATION&MGMT: CPT

## 2023-10-04 PROCEDURE — 71045 X-RAY EXAM CHEST 1 VIEW: CPT

## 2023-10-04 PROCEDURE — 80048 BASIC METABOLIC PNL TOTAL CA: CPT

## 2023-10-04 PROCEDURE — 6370000000 HC RX 637 (ALT 250 FOR IP)

## 2023-10-04 PROCEDURE — 6360000002 HC RX W HCPCS

## 2023-10-04 PROCEDURE — 1200000000 HC SEMI PRIVATE

## 2023-10-04 RX ORDER — HYDRALAZINE HYDROCHLORIDE 50 MG/1
50 TABLET, FILM COATED ORAL EVERY 8 HOURS SCHEDULED
Status: DISCONTINUED | OUTPATIENT
Start: 2023-10-04 | End: 2023-10-05

## 2023-10-04 RX ADMIN — ENOXAPARIN SODIUM 30 MG: 100 INJECTION SUBCUTANEOUS at 20:33

## 2023-10-04 RX ADMIN — LINEZOLID 600 MG: 600 TABLET, FILM COATED ORAL at 20:34

## 2023-10-04 RX ADMIN — Medication 300 UNITS: at 20:34

## 2023-10-04 RX ADMIN — OXYCODONE HYDROCHLORIDE 10 MG: 10 TABLET, FILM COATED, EXTENDED RELEASE ORAL at 20:33

## 2023-10-04 RX ADMIN — METOPROLOL TARTRATE 25 MG: 25 TABLET, FILM COATED ORAL at 07:53

## 2023-10-04 RX ADMIN — INSULIN GLARGINE 30 UNITS: 100 INJECTION, SOLUTION SUBCUTANEOUS at 20:34

## 2023-10-04 RX ADMIN — HYDRALAZINE HYDROCHLORIDE 50 MG: 50 TABLET, FILM COATED ORAL at 13:17

## 2023-10-04 RX ADMIN — COLLAGENASE SANTYL: 250 OINTMENT TOPICAL at 09:00

## 2023-10-04 RX ADMIN — DOXYCYCLINE HYCLATE 100 MG: 100 CAPSULE ORAL at 20:34

## 2023-10-04 RX ADMIN — OXYCODONE HYDROCHLORIDE 10 MG: 10 TABLET, FILM COATED, EXTENDED RELEASE ORAL at 07:53

## 2023-10-04 RX ADMIN — CHLORTHALIDONE 25 MG: 25 TABLET ORAL at 07:55

## 2023-10-04 RX ADMIN — PIPERACILLIN AND TAZOBACTAM 3375 MG: 3; .375 INJECTION, POWDER, LYOPHILIZED, FOR SOLUTION INTRAVENOUS at 10:04

## 2023-10-04 RX ADMIN — PIPERACILLIN AND TAZOBACTAM 3375 MG: 3; .375 INJECTION, POWDER, LYOPHILIZED, FOR SOLUTION INTRAVENOUS at 01:04

## 2023-10-04 RX ADMIN — HYDRALAZINE HYDROCHLORIDE 50 MG: 50 TABLET, FILM COATED ORAL at 07:53

## 2023-10-04 RX ADMIN — CILOSTAZOL 50 MG: 50 TABLET ORAL at 16:44

## 2023-10-04 RX ADMIN — CLONIDINE HYDROCHLORIDE 0.1 MG: 0.1 TABLET ORAL at 13:17

## 2023-10-04 RX ADMIN — SODIUM CHLORIDE, PRESERVATIVE FREE 10 ML: 5 INJECTION INTRAVENOUS at 20:35

## 2023-10-04 RX ADMIN — CILOSTAZOL 50 MG: 50 TABLET ORAL at 07:55

## 2023-10-04 RX ADMIN — CLONIDINE HYDROCHLORIDE 0.1 MG: 0.1 TABLET ORAL at 20:34

## 2023-10-04 RX ADMIN — CLONIDINE HYDROCHLORIDE 0.1 MG: 0.1 TABLET ORAL at 07:54

## 2023-10-04 RX ADMIN — DOXYCYCLINE HYCLATE 100 MG: 100 CAPSULE ORAL at 07:53

## 2023-10-04 RX ADMIN — LINEZOLID 600 MG: 600 TABLET, FILM COATED ORAL at 07:53

## 2023-10-04 RX ADMIN — Medication 2 CAPSULE: at 07:53

## 2023-10-04 RX ADMIN — PIPERACILLIN AND TAZOBACTAM 3375 MG: 3; .375 INJECTION, POWDER, LYOPHILIZED, FOR SOLUTION INTRAVENOUS at 16:47

## 2023-10-04 RX ADMIN — PRIMIDONE 50 MG: 50 TABLET ORAL at 20:34

## 2023-10-04 RX ADMIN — METOPROLOL TARTRATE 25 MG: 25 TABLET, FILM COATED ORAL at 20:34

## 2023-10-04 RX ADMIN — ENOXAPARIN SODIUM 30 MG: 100 INJECTION SUBCUTANEOUS at 07:53

## 2023-10-04 RX ADMIN — ATORVASTATIN CALCIUM 40 MG: 40 TABLET, FILM COATED ORAL at 20:34

## 2023-10-04 RX ADMIN — CLOPIDOGREL BISULFATE 75 MG: 75 TABLET ORAL at 07:53

## 2023-10-04 ASSESSMENT — PAIN DESCRIPTION - LOCATION
LOCATION: FOOT
LOCATION: LEG

## 2023-10-04 ASSESSMENT — PAIN DESCRIPTION - ONSET: ONSET: ON-GOING

## 2023-10-04 ASSESSMENT — PAIN SCALES - GENERAL
PAINLEVEL_OUTOF10: 3
PAINLEVEL_OUTOF10: 8

## 2023-10-04 ASSESSMENT — PAIN DESCRIPTION - FREQUENCY: FREQUENCY: CONTINUOUS

## 2023-10-04 ASSESSMENT — PAIN DESCRIPTION - ORIENTATION
ORIENTATION: LEFT
ORIENTATION: LEFT

## 2023-10-04 NOTE — PROGRESS NOTES
10/03/23 2138   NIV Type   NIV Started/Stopped On   Equipment Type v60   Mode CPAP   Mask Type Full face mask   Mask Size Large   Assessment   SpO2 97 %   Level of Consciousness 0   Comfort Level Good   Using Accessory Muscles No   Mask Compliance Good   Skin Assessment Clean, dry, & intact   Skin Protection for O2 Device Yes   Orientation Middle   Location Nose   Intervention(s) Skin Barrier   Settings/Measurements   CPAP/EPAP 10 cmH2O   Vt (Measured) 572 mL   FiO2  50 %   Minute Volume (L/min) 9.4 Liters   Mask Leak (lpm) 38 lpm   Patient's Home Machine No   Alarm Settings   Alarms On Y   Low Pressure (cmH2O) 8 cmH2O   High Pressure (cmH2O) 22 cmH2O   RR Low (bpm) 10   RR High (bpm) 30 br/min     Date: 10/3/2023    Time: 9:39 PM    Patient Placed On BIPAP/CPAP/ Non-Invasive Ventilation? Yes    If no must comment. Facial area red/color change? No           If YES are Blister/Lesion present? No   If yes must notify nursing staff  BIPAP/CPAP skin barrier?   Yes    Skin barrier type:mepilexlite       Comments:        Kayleen Franco RCP

## 2023-10-04 NOTE — HOME CARE
[facility] AND SPECIFIC WOUND CARE ORDER FOR PATIENTS SOC UPON DISCHARGE.      Mable Linder LPN   [facility] [address]

## 2023-10-04 NOTE — CARE COORDINATION
10/4. Discharge plan is home when medically stable. The pt is refusing any IV antibiotics post discharge. Chelsy Valverde RN  1520. Spoke with the pt, with Dr Maty Jarquin present. The pt indicated that he does not have anyone to help him at his home. He said he cannot come to the infusion center and is refusing to go to any facility.  Chelsy Valverde RN

## 2023-10-04 NOTE — PROGRESS NOTES
Podiatry Progress Note  10/4/2023   Lolly Laser       SUBJECTIVE: Patient being seen for follow up of left ankle wound. Patient today relates that his leg feels a lot better.     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[address]) 01/16/2020    Acute kidney injury ([address]) 05/18/2020    AKA stump complication ([address]) 11/18/2015    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 ([address]) 05/22/2018    Atherosclerosis of native artery of left leg with ulceration of ankle ([address]) 9/29/2023    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 ([address]) 05/21/2018    Cellulitis, scrotum 03/20/2020    Coagulopathy ([address]) 05/21/2018    Critical lower limb ischemia ([address]) 03/20/2020    Diabetes mellitus ([address])     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 2 diabetes mellitus, with fat layer exposed ([address]) 05/22/2018    Disruption of closure of muscle or muscle flap, sequela 08/26/2020    DVT, lower extremity ([address])     right leg     Encounter for dental examination and cleaning with abnormal findings 04/02/2018    Gas gangrene of thigh ([address]) 03/20/2020    History of DVT (deep vein thrombosis) 07/31/2018    Hyperglycemia due to type 2 diabetes mellitus ([address]) 03/20/2020    Hyperlipidemia     Hypertension     Ischemic ulcer of right thigh with fat layer exposed ([address])     Ischemic ulcer of right thigh with necrosis of muscle (HCC)     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[address])     Moderate protein-calorie malnutrition ([address]) 05/23/2018    Occlusion of common femoral artery ([address]) 03/20/2020    RAGHU (obstructive sleep apnea)     Osteomyelitis ([address]) 10/24/2018    Osteomyelitis of right lower limb ([address]) 10/24/2018    Pain syndrome, chronic     Postoperative wound infection     PVD (peripheral vascular disease) ([address])     Tobacco dependence     Vascular occlusion     Vitamin D deficiency     Wound infection 04/16/2020        Past Surgical History:   Procedure Daily Katelyn Hermosillo APRN - CNP   25 mg at 10/04/23 0755    cilostazol (PLETAL) tablet 50 mg  50 mg Oral BID AC Shailesh Hermosillo APRN - CNP   50 mg at 10/04/23 0755    cloNIDine (CATAPRES) tablet 0.1 mg  0.1 mg Oral TID Penelope Escobar MD   0.1 mg at 10/04/23 0754    dextrose 10 % infusion   IntraVENous Continuous PRN Shailesh Hermosillo APRN - CNP        dextrose bolus 10% 125 mL  125 mL IntraVENous PRN Shailesh Hermosillo APRN - CNP        Or    dextrose bolus 10% 250 mL  250 mL IntraVENous PRN Shailesh Hermosillo APRN - CNP        [Held by provider] DULoxetine (CYMBALTA) extended release capsule 60 mg  60 mg Oral Daily Shailesh Hermosillo APRN - CNP   60 mg at 09/30/23 0905    enoxaparin Sodium (LOVENOX) injection 30 mg  30 mg SubCUTAneous BID Shailesh Hermosillo APRN - CNP   30 mg at 10/04/23 0753    glucagon injection 1 mg  1 mg SubCUTAneous PRN Shailesh Hermosillo APRN - CNP        glucose chewable tablet 16 g  16 g Oral PRN Shailesh Hermosillo APRN - CNP        hydrALAZINE (APRESOLINE) tablet 50 mg  50 mg Oral BID Shailesh Hermosillo APRN - CNP   50 mg at 10/04/23 0753    insulin glargine (LANTUS) injection vial 10 Units  10 Units SubCUTAneous QAM Shailesh Hermosillo APRN - CNP   10 Units at 10/03/23 0857    insulin glargine (LANTUS) injection vial 30 Units  30 Units SubCUTAneous Nightly Shailesh Hermosillo APRN - CNP   30 Units at 10/03/23 2115    insulin lispro (HUMALOG) injection vial 0-4 Units  0-4 Units SubCUTAneous Nightly Shailesh Hermosillo APRN - CNP        insulin lispro (HUMALOG) injection vial 0-8 Units  0-8 Units SubCUTAneous TID WC Shailesh Hermosillo APRN - CNP   2 Units at 09/30/23 1244    lactobacillus (CULTURELLE) capsule 2 capsule  2 capsule Oral Daily Shailesh Hermosillo APRN - CNP   2 capsule at 10/04/23 0753    magnesium sulfate 2000 mg in 50 mL IVPB premix  2,000 mg IntraVENous PRN Shailesh Hermosillo, SHONDA - CNP        melatonin tablet 3 mg  3 mg Oral Nightly PRN Shailesh Hermosillo APRN - CNP        metoprolol tartrate (LOPRESSOR) tablet 25 mg

## 2023-10-04 NOTE — PROGRESS NOTES
CHG double lumen power picc Placement 10/4/2023    Product number: [id number]   Lot Number: [id number]      Ultrasound: yes   Right Brachial vein:                Upper Arm Circumference: (CM) 43    Size:(FR)/GUAGE 5.5/55 cm    Exposed Length: (CM) 3    Internal Length: (CM) 41   Cut: (CM) 11   Vein Measurement: 0.58 cm              Lidocaine Given: yes lidocaine 1%               Procedure performed by TREVON Beasley RN  10/4/2023  11:50 AM      CHG double lumen power picc inserted using the VPS guidance system. Unable to obtain bullseye. CXR ordered for picc placement.

## 2023-10-05 VITALS
BODY MASS INDEX: 34.92 KG/M2 | DIASTOLIC BLOOD PRESSURE: 76 MMHG | RESPIRATION RATE: 16 BRPM | HEART RATE: 68 BPM | OXYGEN SATURATION: 100 % | WEIGHT: 272.1 LBS | HEIGHT: 74 IN | SYSTOLIC BLOOD PRESSURE: 166 MMHG | TEMPERATURE: 98 F

## 2023-10-05 LAB
ANION GAP SERPL CALCULATED.3IONS-SCNC: 12 MMOL/L (ref 7–16)
BUN SERPL-MCNC: 22 MG/DL (ref 6–23)
CALCIUM SERPL-MCNC: 9.4 MG/DL (ref 8.6–10.2)
CHLORIDE SERPL-SCNC: 104 MMOL/L (ref 98–107)
CO2 SERPL-SCNC: 25 MMOL/L (ref 22–29)
CREAT SERPL-MCNC: 2.1 MG/DL (ref 0.7–1.2)
ERYTHROCYTE [DISTWIDTH] IN BLOOD BY AUTOMATED COUNT: 14.4 % (ref 11.5–15)
GFR SERPL CREATININE-BSD FRML MDRD: 35 ML/MIN/1.73M2
GLUCOSE BLD-MCNC: 128 MG/DL (ref 74–99)
GLUCOSE BLD-MCNC: 160 MG/DL (ref 74–99)
GLUCOSE BLD-MCNC: 184 MG/DL (ref 74–99)
GLUCOSE BLD-MCNC: 192 MG/DL (ref 74–99)
GLUCOSE SERPL-MCNC: 110 MG/DL (ref 74–99)
HCT VFR BLD AUTO: 28.7 % (ref 37–54)
HGB BLD-MCNC: 8.7 G/DL (ref 12.5–16.5)
MCH RBC QN AUTO: 24.6 PG (ref 26–35)
MCHC RBC AUTO-ENTMCNC: 30.3 G/DL (ref 32–34.5)
MCV RBC AUTO: 81.1 FL (ref 80–99.9)
PLATELET # BLD AUTO: 270 K/UL (ref 130–450)
PMV BLD AUTO: 10.7 FL (ref 7–12)
POTASSIUM SERPL-SCNC: 4.3 MMOL/L (ref 3.5–5)
RBC # BLD AUTO: 3.54 M/UL (ref 3.8–5.8)
SODIUM SERPL-SCNC: 141 MMOL/L (ref 132–146)
WBC OTHER # BLD: 11 K/UL (ref 4.5–11.5)

## 2023-10-05 PROCEDURE — 6360000002 HC RX W HCPCS

## 2023-10-05 PROCEDURE — 6370000000 HC RX 637 (ALT 250 FOR IP): Performed by: STUDENT IN AN ORGANIZED HEALTH CARE EDUCATION/TRAINING PROGRAM

## 2023-10-05 PROCEDURE — 85027 COMPLETE CBC AUTOMATED: CPT

## 2023-10-05 PROCEDURE — 2580000003 HC RX 258

## 2023-10-05 PROCEDURE — 6370000000 HC RX 637 (ALT 250 FOR IP): Performed by: INTERNAL MEDICINE

## 2023-10-05 PROCEDURE — 80048 BASIC METABOLIC PNL TOTAL CA: CPT

## 2023-10-05 PROCEDURE — 6370000000 HC RX 637 (ALT 250 FOR IP)

## 2023-10-05 PROCEDURE — 2580000003 HC RX 258: Performed by: INTERNAL MEDICINE

## 2023-10-05 PROCEDURE — 82962 GLUCOSE BLOOD TEST: CPT

## 2023-10-05 PROCEDURE — 6360000002 HC RX W HCPCS: Performed by: INTERNAL MEDICINE

## 2023-10-05 RX ORDER — HYDRALAZINE HYDROCHLORIDE 50 MG/1
100 TABLET, FILM COATED ORAL EVERY 8 HOURS SCHEDULED
Status: DISCONTINUED | OUTPATIENT
Start: 2023-10-05 | End: 2023-10-06 | Stop reason: HOSPADM

## 2023-10-05 RX ORDER — LOSARTAN POTASSIUM 50 MG/1
25 TABLET ORAL DAILY
Status: DISCONTINUED | OUTPATIENT
Start: 2023-10-05 | End: 2023-10-06 | Stop reason: HOSPADM

## 2023-10-05 RX ADMIN — PIPERACILLIN AND TAZOBACTAM 3375 MG: 3; .375 INJECTION, POWDER, LYOPHILIZED, FOR SOLUTION INTRAVENOUS at 16:14

## 2023-10-05 RX ADMIN — CLONIDINE HYDROCHLORIDE 0.1 MG: 0.1 TABLET ORAL at 07:32

## 2023-10-05 RX ADMIN — CHLORTHALIDONE 25 MG: 25 TABLET ORAL at 07:36

## 2023-10-05 RX ADMIN — METOPROLOL TARTRATE 25 MG: 25 TABLET, FILM COATED ORAL at 07:32

## 2023-10-05 RX ADMIN — SODIUM CHLORIDE, PRESERVATIVE FREE 10 ML: 5 INJECTION INTRAVENOUS at 07:35

## 2023-10-05 RX ADMIN — COLLAGENASE SANTYL: 250 OINTMENT TOPICAL at 11:26

## 2023-10-05 RX ADMIN — LINEZOLID 600 MG: 600 TABLET, FILM COATED ORAL at 20:01

## 2023-10-05 RX ADMIN — HYDRALAZINE HYDROCHLORIDE 100 MG: 50 TABLET, FILM COATED ORAL at 13:23

## 2023-10-05 RX ADMIN — OXYCODONE HYDROCHLORIDE 10 MG: 10 TABLET, FILM COATED, EXTENDED RELEASE ORAL at 20:00

## 2023-10-05 RX ADMIN — DOXYCYCLINE HYCLATE 100 MG: 100 CAPSULE ORAL at 20:01

## 2023-10-05 RX ADMIN — INSULIN GLARGINE 30 UNITS: 100 INJECTION, SOLUTION SUBCUTANEOUS at 20:01

## 2023-10-05 RX ADMIN — ATORVASTATIN CALCIUM 40 MG: 40 TABLET, FILM COATED ORAL at 20:01

## 2023-10-05 RX ADMIN — LINEZOLID 600 MG: 600 TABLET, FILM COATED ORAL at 07:32

## 2023-10-05 RX ADMIN — CILOSTAZOL 50 MG: 50 TABLET ORAL at 16:10

## 2023-10-05 RX ADMIN — PIPERACILLIN AND TAZOBACTAM 3375 MG: 3; .375 INJECTION, POWDER, LYOPHILIZED, FOR SOLUTION INTRAVENOUS at 01:31

## 2023-10-05 RX ADMIN — PIPERACILLIN AND TAZOBACTAM 3375 MG: 3; .375 INJECTION, POWDER, LYOPHILIZED, FOR SOLUTION INTRAVENOUS at 07:43

## 2023-10-05 RX ADMIN — CLONIDINE HYDROCHLORIDE 0.1 MG: 0.1 TABLET ORAL at 13:23

## 2023-10-05 RX ADMIN — OXYCODONE HYDROCHLORIDE 10 MG: 10 TABLET, FILM COATED, EXTENDED RELEASE ORAL at 07:39

## 2023-10-05 RX ADMIN — DOXYCYCLINE HYCLATE 100 MG: 100 CAPSULE ORAL at 07:32

## 2023-10-05 RX ADMIN — Medication 300 UNITS: at 07:33

## 2023-10-05 RX ADMIN — SODIUM CHLORIDE, PRESERVATIVE FREE 10 ML: 5 INJECTION INTRAVENOUS at 20:02

## 2023-10-05 RX ADMIN — HYDRALAZINE HYDROCHLORIDE 50 MG: 50 TABLET, FILM COATED ORAL at 07:32

## 2023-10-05 RX ADMIN — LOSARTAN POTASSIUM 25 MG: 50 TABLET, FILM COATED ORAL at 11:31

## 2023-10-05 RX ADMIN — CLONIDINE HYDROCHLORIDE 0.1 MG: 0.1 TABLET ORAL at 20:01

## 2023-10-05 RX ADMIN — INSULIN GLARGINE 10 UNITS: 100 INJECTION, SOLUTION SUBCUTANEOUS at 08:36

## 2023-10-05 RX ADMIN — CLOPIDOGREL BISULFATE 75 MG: 75 TABLET ORAL at 07:32

## 2023-10-05 RX ADMIN — METOPROLOL TARTRATE 25 MG: 25 TABLET, FILM COATED ORAL at 20:01

## 2023-10-05 RX ADMIN — Medication 300 UNITS: at 20:02

## 2023-10-05 RX ADMIN — ENOXAPARIN SODIUM 30 MG: 100 INJECTION SUBCUTANEOUS at 07:32

## 2023-10-05 RX ADMIN — CILOSTAZOL 50 MG: 50 TABLET ORAL at 07:33

## 2023-10-05 RX ADMIN — PRIMIDONE 50 MG: 50 TABLET ORAL at 20:00

## 2023-10-05 RX ADMIN — Medication 2 CAPSULE: at 07:33

## 2023-10-05 ASSESSMENT — PAIN DESCRIPTION - ORIENTATION: ORIENTATION: LEFT

## 2023-10-05 ASSESSMENT — PAIN DESCRIPTION - DESCRIPTORS: DESCRIPTORS: DULL;ACHING

## 2023-10-05 ASSESSMENT — PAIN SCALES - GENERAL: PAINLEVEL_OUTOF10: 7

## 2023-10-05 ASSESSMENT — PAIN DESCRIPTION - LOCATION: LOCATION: ANKLE

## 2023-10-05 NOTE — PROCEDURES
Date: 10/4/2023    Time: 2045 PM    Patient Placed On BIPAP/CPAP/ Non-Invasive Ventilation? Yes    If no must comment. Facial area red/color change? No           If YES are Blister/Lesion present? No   If yes must notify nursing staff  BIPAP/CPAP skin barrier?   Yes    Skin barrier type:mepilexlite       Comments: Patient placed on mask CPAP for HS        [employer], RCP

## 2023-10-05 NOTE — CARE COORDINATION
10/5. The pt said he has someone to help him with his meds at home. Will need prescription for IV zosyn faxed to [facility] care. Pharmacy service agreement faxed to [phone].  Alice Rebolledo RN

## 2023-10-05 NOTE — DISCHARGE SUMMARY
Physician Discharge Summary     Patient ID:  Ronald Malloy  [id number]  46 y.o.  1955    Admit date: 9/28/2023    Discharge date and time:  10/5/2023    Discharge Diagnoses: Principal Problem: Atherosclerosis of native artery of left leg with ulceration of ankle (HCC)  Active Problems:    CKD (chronic kidney disease) stage 3, GFR 30-59 ml/min (HCC)    Osteomyelitis of left fibula (HCC)  Resolved Problems:    * No resolved hospital problems. *      Consults: IP CONSULT TO VASCULAR SURGERY  IP CONSULT TO INFECTIOUS DISEASES  IP CONSULT TO PODIATRY  IP CONSULT TO NEPHROLOGY    Procedures: See below    Hospital Course:  80-year old male patient with type 2 diabetes, hypertension, PVD, smoking, hyperlipidemia who came in as a transfer from [facility] for vascular surgery evaluation given his right lower extremity nonhealing wound. Left foot wound w/ infection likely related to PVD and DM. Wound culture showed multiple organisms including Proteus, gram-positive rods, diphtheroids  Osteomyelitis left distal fibular shaft, metaphysis and lateral malleolus per MRI report   H/o right AKA  Type 2 diabetes mellitus  Hyperlipidemia  Hypertension  Obesity  Smoking  COLE on CKD 3b     PLAN:    -Infectious disease consult appreciated antibiotics  Zosyn, stop linezolid, holding doxycycline for suppression--discussed case  Planning piperacillin-tazobactam 3.375g q8h for 6 weeks from 09/29-11/10  - Blood cultures final result no grow  -Vascular surgery recommended medical management so far for his PVD. Continue on cilostazol and is started on Plavix  -Podiatry  consult appreciated.   Discussed case status post bedside debridement 10/3.  -Pain management  -Bowel regiment  -Tight glucose control  - PT/OT    Nephrology consult appreciated        DVT prophylaxis Lovenox    DISPOSITION: agreeable to home IV antibiotics with home health care Planning piperacillin-tazobactam 3.375g q8h for 6 weeks from 09/29-11/10    Discharge

## 2023-10-05 NOTE — CARE COORDINATION
10/5. Transportation arranged with [id number] for 5:00 PM. Home care notified of the discharge.  Hattie Amezcua RN

## 2023-10-05 NOTE — PROGRESS NOTES
Michelle Hernandez MD at [address] Right 11/1/2018    AMPUTATION ABOVE KNEE RIGHT LEG performed by Ale Barrett MD at [address] [place] [address] I&D BELOW FASCIA FOOT 1 BURSAL SPACE Right 5/24/2018    RIGHT FOOT INCISION AND DRAINAGE WITH PARTIAL BONE RESECTION performed by Tony Roa DPM at [address] [place] [address] Matt OFFICE/OUTPT VISIT,PROCEDURE ONLY Right 8/3/2018    INCISION AND DRAINAGE MULTIPLE AREAS RIGHT FOOT WITH DEBRIDEMENT SOFT TISSUE performed by Tony Roa DPM at [address] [place] [address] Right     leg          Family History   Problem Relation Age of Onset    Cancer Mother     Diabetes Father     Heart Failure Father     Hypertension Father     Cancer Sister         Social History     Tobacco Use    Smoking status: Every Day     Packs/day: 0.50     Years: 7.00     Additional pack years: 0.00     Total pack years: 3.50     Types: Cigarettes    Smokeless tobacco: Never   Substance Use Topics    Alcohol use: No        Prior to Admission medications    Medication Sig Start Date End Date Taking? Authorizing Provider   piperacillin-tazobactam (ZOSYN) infusion Infuse 3.375 g intravenously in the morning and 3.375 g at noon and 3.375 g in the evening. Compound per protocol. . 10/3/23 11/10/23 Yes Claudette Hoguet, MD   primidone (MYSOLINE) 50 MG tablet Take 1 tablet by mouth nightly 9/26/23 12/25/23  SHONDA Adams - NP   insulin glargine (LANTUS SOLOSTAR) 100 UNIT/ML injection pen INJECT 10 UNITS SUBCUTANEOUSLY IN THE MORNING AND 30 UNITS EVERY EVENING 9/20/23   Juanito Kline DO   gabapentin (NEURONTIN) 800 MG tablet TAKE 1 TABLET BY MOUTH FOUR TIMES DAILY 9/18/23 9/17/24  Juanito Kline DO   DULoxetine (CYMBALTA) 60 MG extended release capsule TAKE TWO (2) CAPSULES BY MOUTH EVERY MORNING 9/18/23   Juanito Kline DO   insulin lispro, 1 Unit Dial, (HUMALOG/ADMELOG) 100 UNIT/ML SOPN CHECK BLOOD SUGAR 3-4 XDAILY & INJECT AS FOLLOWS =0U 121- 150=2U thickness ulceration, infected, left lateral ankle  - Soft tissue edema, left ankle   - Severe PVD, prior hx of right AKA  - Type II DM  -Diabetic ulcer left ankle grade 3      PLAN:  - Examined and evaluated  - All labs, imaging, and charts reviewed  - Abx per ID: observe off antibiotics  - Left leg wound cx: Gram negative organisms and possible gram positive organisms preliminary   - Left tib fib xrays and ankle xrays: No acute osseous abnormalities. Irregular wound along distal lateral edge. - Arterial studies(8/14/23): Doppler evidence of severe femoral popliteal occlusive disease. Severe arterial compromise to the left leg below the groin  - Left tib fib MRI: Large soft tissue wound overlying the distal fibula. Findings consistent with acute osteomyelitis of the distal fibular shaft,  metaphysis and lateral malleolus. Motion degrades the quality of the exam. Diffuse nonspecific myositis. Diffuse nonspecific subcutaneous edema. - Vascular: No plans for surgical intervention unless wound not progressing. Could consider further tibial intervention.   - Patient at high risk for limb loss due to severe infection and vascular compromise   - Dressing :aquacell, ace dsd, santyl.  - Bedside debridement done 10/03/2023 to patient tolerance. Removed most of the slough from the wound. Under sterile debride, utilizing a 15 blade, excisonal debridement was achieved down to subcutaneous tissue without incident. All vital and neural structures identified and retracted. Bleeders were cauterized and wound was flushed thoroughly without incident. Dressing was applied  - Will continue to monitor while in house  - Discussed with Dr Lotus Murguia DPM   10/5/2023   9:56 AM       Thank you for involving podiatry in this patient's care.  Please do not hesitate to call with any questions, concerns, or new findings

## 2023-10-11 DIAGNOSIS — G89.18 POSTOPERATIVE PAIN: ICD-10-CM

## 2023-10-11 DIAGNOSIS — Z79.4 CONTROLLED TYPE 2 DIABETES MELLITUS WITH OTHER SPECIFIED COMPLICATION, WITH LONG-TERM CURRENT USE OF INSULIN (HCC): ICD-10-CM

## 2023-10-11 DIAGNOSIS — E11.69 CONTROLLED TYPE 2 DIABETES MELLITUS WITH OTHER SPECIFIED COMPLICATION, WITH LONG-TERM CURRENT USE OF INSULIN (HCC): ICD-10-CM

## 2023-10-11 DIAGNOSIS — T87.9 AKA STUMP COMPLICATION (HCC): ICD-10-CM

## 2023-10-11 DIAGNOSIS — I73.9 PVD (PERIPHERAL VASCULAR DISEASE) (HCC): ICD-10-CM

## 2023-10-11 DIAGNOSIS — G89.4 CHRONIC PAIN SYNDROME: ICD-10-CM

## 2023-10-11 RX ORDER — OXYCODONE AND ACETAMINOPHEN 7.5; 325 MG/1; MG/1
1 TABLET ORAL EVERY 4 HOURS PRN
Qty: 120 TABLET | Refills: 0 | Status: SHIPPED | OUTPATIENT
Start: 2023-10-11 | End: 2023-11-10

## 2023-10-11 RX ORDER — OXYCODONE HCL 10 MG/1
10 TABLET, FILM COATED, EXTENDED RELEASE ORAL 2 TIMES DAILY
Qty: 60 TABLET | Refills: 0 | Status: SHIPPED | OUTPATIENT
Start: 2023-10-11 | End: 2023-11-10

## 2023-10-16 RX ORDER — PRIMIDONE 50 MG/1
50 TABLET ORAL NIGHTLY
Qty: 30 TABLET | Refills: 2 | OUTPATIENT
Start: 2023-10-16 | End: 2024-01-14

## 2023-10-17 RX ORDER — AMMONIUM LACTATE 12 G/100G
LOTION TOPICAL
Qty: 400 G | Refills: 10 | Status: SHIPPED | OUTPATIENT
Start: 2023-10-17

## 2023-10-30 NOTE — DISCHARGE INSTRUCTIONS
Visit Discharge/Physician Orders    Discharge condition: {STABLE/UNSTABLE:520240680}    Assessment of pain at discharge:    Anesthetic used:     Discharge to: {CHP Wound Discharge To:39064}    Left via:{Left NQA:34433}    Accompanied by: accompanied by {:[id number]}    ECF/HHA:     Dressing Orders:    Treatment Orders:    [facility] visit _____________________________  (Please note your next appointment above and if you are unable to keep, kindly give a 24 hour notice. Thank you.)    Physician signature:__________________________      If you experience any of the following, please call the Care Team [facility] during business hours:    * Increase in Pain  * Temperature over 101  * Increase in drainage from your wound  * Drainage with a foul odor  * Bleeding  * Increase in swelling  * Need for compression bandage changes due to slippage, breakthrough drainage. If you need medical attention outside of the business hours of the [address] [place], [place] [address]i [place] please contact your PCP or go to the nearest emergency room.

## 2023-11-01 ENCOUNTER — HOSPITAL ENCOUNTER (OUTPATIENT)
Dept: WOUND CARE | Age: 68
Discharge: HOME | End: 2023-11-01

## 2023-11-09 DIAGNOSIS — I73.9 PVD (PERIPHERAL VASCULAR DISEASE) (HCC): ICD-10-CM

## 2023-11-09 DIAGNOSIS — E11.69 CONTROLLED TYPE 2 DIABETES MELLITUS WITH OTHER SPECIFIED COMPLICATION, WITH LONG-TERM CURRENT USE OF INSULIN (HCC): ICD-10-CM

## 2023-11-09 DIAGNOSIS — Z79.4 CONTROLLED TYPE 2 DIABETES MELLITUS WITH OTHER SPECIFIED COMPLICATION, WITH LONG-TERM CURRENT USE OF INSULIN (HCC): ICD-10-CM

## 2023-11-09 DIAGNOSIS — G89.18 POSTOPERATIVE PAIN: ICD-10-CM

## 2023-11-09 DIAGNOSIS — G89.4 CHRONIC PAIN SYNDROME: ICD-10-CM

## 2023-11-09 DIAGNOSIS — T87.9 AKA STUMP COMPLICATION (HCC): ICD-10-CM

## 2023-11-09 RX ORDER — OXYCODONE AND ACETAMINOPHEN 7.5; 325 MG/1; MG/1
1 TABLET ORAL EVERY 4 HOURS PRN
Qty: 120 TABLET | Refills: 0 | Status: SHIPPED | OUTPATIENT
Start: 2023-11-09 | End: 2023-12-09

## 2023-11-09 RX ORDER — OXYCODONE HCL 10 MG/1
10 TABLET, FILM COATED, EXTENDED RELEASE ORAL 2 TIMES DAILY
Qty: 60 TABLET | Refills: 0 | Status: SHIPPED | OUTPATIENT
Start: 2023-11-09 | End: 2023-12-09

## 2023-11-13 NOTE — DISCHARGE INSTRUCTIONS
Visit Discharge/Physician Orders    Discharge condition: Stable    Assessment of pain at discharge: yes    Anesthetic used: 4% lidocaine     Discharge to: Home    Left via:Private automobile    Accompanied by: family    ECF/HHA: [facility]    Dressing Orders: Left lateral ankle: Cleanse wound with normal saline. Cover wound with silver alginate. Secure with abd and dry dressing. Apply spandagrip. APPLY SPANDAGRIP'S IN THE MORNING AND REMOVE AT BEDTIME. ELEVATE LEGS ABOVE THE LEVEL OF THE HEART 3-4 TIMES A DAY FOR AN HOUR. HAND [address] SPANDAGRIP'S AND LINE DRY. Treatment Orders: Eat a diet high in protein and vitamin C. Take a multiple vitamin daily unless contraindicated. Follow up with Dr. Vianca Cervantes     [facility] followup visit ______6 weeks with Dr. KAMINSKI________________  (Please note your next appointment above and if you are unable to keep, kindly give a 24 hour notice. Thank you.)    Physician signature:__________________________      If you experience any of the following, please call the [address] [place] [address] during business hours:    * Increase in Pain  * Temperature over 101  * Increase in drainage from your wound  * Drainage with a foul odor  * Bleeding  * Increase in swelling  * Need for compression bandage changes due to slippage, breakthrough drainage. If you need medical attention outside of the business hours of the [address] [place] [address] please contact your PCP or go to the nearest emergency room.

## 2023-11-15 ENCOUNTER — HOSPITAL ENCOUNTER (OUTPATIENT)
Dept: WOUND CARE | Age: 68
Discharge: HOME | End: 2023-11-15
Payer: COMMERCIAL

## 2023-11-15 VITALS
SYSTOLIC BLOOD PRESSURE: 170 MMHG | WEIGHT: 245 LBS | TEMPERATURE: 97.1 F | HEIGHT: 74 IN | DIASTOLIC BLOOD PRESSURE: 77 MMHG | HEART RATE: 71 BPM | BODY MASS INDEX: 31.44 KG/M2 | RESPIRATION RATE: 18 BRPM

## 2023-11-15 DIAGNOSIS — L97.224 DIABETIC ULCER OF LEFT CALF ASSOCIATED WITH TYPE 2 DIABETES MELLITUS, WITH NECROSIS OF BONE (HCC): Chronic | ICD-10-CM

## 2023-11-15 DIAGNOSIS — E11.622 DIABETIC ULCER OF LEFT CALF ASSOCIATED WITH TYPE 2 DIABETES MELLITUS, WITH NECROSIS OF BONE (HCC): Chronic | ICD-10-CM

## 2023-11-15 DIAGNOSIS — I70.243 ATHEROSCLEROSIS OF NATIVE ARTERY OF LEFT LEG WITH ULCERATION OF ANKLE (HCC): Primary | Chronic | ICD-10-CM

## 2023-11-15 DIAGNOSIS — M86.462 CHRONIC OSTEOMYELITIS OF LEFT FIBULA WITH DRAINING SINUS (HCC): ICD-10-CM

## 2023-11-15 PROCEDURE — 11046 DBRDMT MUSC&/FSCA EA ADDL: CPT

## 2023-11-15 PROCEDURE — 11043 DBRDMT MUSC&/FSCA 1ST 20/<: CPT

## 2023-11-15 PROCEDURE — 99213 OFFICE O/P EST LOW 20 MIN: CPT

## 2023-11-15 RX ORDER — GENTAMICIN SULFATE 1 MG/G
OINTMENT TOPICAL ONCE
OUTPATIENT
Start: 2023-11-15 | End: 2023-11-15

## 2023-11-15 RX ORDER — LIDOCAINE 40 MG/G
CREAM TOPICAL ONCE
OUTPATIENT
Start: 2023-11-15 | End: 2023-11-15

## 2023-11-15 RX ORDER — LIDOCAINE HYDROCHLORIDE 20 MG/ML
JELLY TOPICAL ONCE
Status: CANCELLED | OUTPATIENT
Start: 2023-11-15 | End: 2023-11-15

## 2023-11-15 RX ORDER — CLOBETASOL PROPIONATE 0.5 MG/G
OINTMENT TOPICAL ONCE
Status: CANCELLED | OUTPATIENT
Start: 2023-11-15 | End: 2023-11-15

## 2023-11-15 RX ORDER — LIDOCAINE HYDROCHLORIDE 40 MG/ML
SOLUTION TOPICAL ONCE
Status: COMPLETED | OUTPATIENT
Start: 2023-11-15 | End: 2023-11-15

## 2023-11-15 RX ORDER — BACITRACIN ZINC 500 [USP'U]/G
OINTMENT TOPICAL ONCE
OUTPATIENT
Start: 2023-11-15 | End: 2023-11-15

## 2023-11-15 RX ORDER — LIDOCAINE HYDROCHLORIDE 40 MG/ML
SOLUTION TOPICAL ONCE
Status: CANCELLED | OUTPATIENT
Start: 2023-11-15 | End: 2023-11-15

## 2023-11-15 RX ORDER — BETAMETHASONE DIPROPIONATE 0.5 MG/G
CREAM TOPICAL ONCE
OUTPATIENT
Start: 2023-11-15 | End: 2023-11-15

## 2023-11-15 RX ORDER — SODIUM CHLOR/HYPOCHLOROUS ACID 0.033 %
SOLUTION, IRRIGATION IRRIGATION ONCE
Status: CANCELLED | OUTPATIENT
Start: 2023-11-15 | End: 2023-11-15

## 2023-11-15 RX ORDER — LIDOCAINE HYDROCHLORIDE 40 MG/ML
SOLUTION TOPICAL ONCE
OUTPATIENT
Start: 2023-11-15 | End: 2023-11-15

## 2023-11-15 RX ORDER — IBUPROFEN 200 MG
TABLET ORAL ONCE
OUTPATIENT
Start: 2023-11-15 | End: 2023-11-15

## 2023-11-15 RX ORDER — SODIUM CHLOR/HYPOCHLOROUS ACID 0.033 %
SOLUTION, IRRIGATION IRRIGATION ONCE
OUTPATIENT
Start: 2023-11-15 | End: 2023-11-15

## 2023-11-15 RX ORDER — BACITRACIN ZINC 500 [USP'U]/G
OINTMENT TOPICAL ONCE
Status: CANCELLED | OUTPATIENT
Start: 2023-11-15 | End: 2023-11-15

## 2023-11-15 RX ORDER — BETAMETHASONE DIPROPIONATE 0.5 MG/G
CREAM TOPICAL ONCE
Status: CANCELLED | OUTPATIENT
Start: 2023-11-15 | End: 2023-11-15

## 2023-11-15 RX ORDER — TRIAMCINOLONE ACETONIDE 1 MG/G
OINTMENT TOPICAL ONCE
OUTPATIENT
Start: 2023-11-15 | End: 2023-11-15

## 2023-11-15 RX ORDER — LIDOCAINE HYDROCHLORIDE 20 MG/ML
JELLY TOPICAL ONCE
OUTPATIENT
Start: 2023-11-15 | End: 2023-11-15

## 2023-11-15 RX ORDER — BACITRACIN ZINC AND POLYMYXIN B SULFATE 500; 1000 [USP'U]/G; [USP'U]/G
OINTMENT TOPICAL ONCE
Status: CANCELLED | OUTPATIENT
Start: 2023-11-15 | End: 2023-11-15

## 2023-11-15 RX ORDER — LIDOCAINE 50 MG/G
OINTMENT TOPICAL ONCE
OUTPATIENT
Start: 2023-11-15 | End: 2023-11-15

## 2023-11-15 RX ORDER — BACITRACIN ZINC AND POLYMYXIN B SULFATE 500; 1000 [USP'U]/G; [USP'U]/G
OINTMENT TOPICAL ONCE
OUTPATIENT
Start: 2023-11-15 | End: 2023-11-15

## 2023-11-15 RX ORDER — LIDOCAINE 50 MG/G
OINTMENT TOPICAL ONCE
Status: CANCELLED | OUTPATIENT
Start: 2023-11-15 | End: 2023-11-15

## 2023-11-15 RX ORDER — GENTAMICIN SULFATE 1 MG/G
OINTMENT TOPICAL ONCE
Status: CANCELLED | OUTPATIENT
Start: 2023-11-15 | End: 2023-11-15

## 2023-11-15 RX ORDER — LIDOCAINE 40 MG/G
CREAM TOPICAL ONCE
Status: CANCELLED | OUTPATIENT
Start: 2023-11-15 | End: 2023-11-15

## 2023-11-15 RX ORDER — CLOBETASOL PROPIONATE 0.5 MG/G
OINTMENT TOPICAL ONCE
OUTPATIENT
Start: 2023-11-15 | End: 2023-11-15

## 2023-11-15 RX ORDER — TRIAMCINOLONE ACETONIDE 1 MG/G
OINTMENT TOPICAL ONCE
Status: CANCELLED | OUTPATIENT
Start: 2023-11-15 | End: 2023-11-15

## 2023-11-15 RX ORDER — IBUPROFEN 200 MG
TABLET ORAL ONCE
Status: CANCELLED | OUTPATIENT
Start: 2023-11-15 | End: 2023-11-15

## 2023-11-15 RX ADMIN — LIDOCAINE HYDROCHLORIDE 5 ML: 40 SOLUTION TOPICAL at 13:46

## 2023-11-15 NOTE — PROGRESS NOTES
[facility] Followup Visit Note    Referring Physician : Cortney Fernandes, DO  [address] RECORD NUMBER:  [id number]  AGE: 74 y.o. GENDER: male  : 1955  EPISODE DATE:  11/15/2023    Subjective:     Chief Complaint   Patient presents with    Wound Check     L ankle      HISTORY of PRESENT ILLNESS HPI   Jessica Jennings is a 74 y.o. male who presents today in regards to follow up evaluation and treatment of wound/ulcer. That patient's past medical, family and social hx were reviewed and changes were made if present.     History of Wound Context:  Patient has had wound since    He underwe    11/15/23  L DP strongly biphasic, PT weakly biphasic  Lateral calf wound is much improved in appearance  Bone is still exposed  He will continue to followup with Dr Lenore Encarnacion as outpt  He will fu with me in 6 weeks  We did discuss hbo but pt does not have transportation that would make this feasible - he will let me know if that would change    Wound/Ulcer Pain Timing/Severity: {PAIN ASSESSMENT:18848}  Quality of pain: {PAIN QUALITY:}  Severity:  {NUMBERS 0-10:} / 10   Modifying Factors: {Modifying Factors:256649815}  Associated Signs/Symptoms: {ASSOCIATED SYMPTOMS:624660428}    Ulcer Identification:  Ulcer Type: {WOUND KRWL:256232856}  Contributing Factors: {HEALTH FACTORS:327808735}    Diabetic/Pressure/Non Pressure Ulcers only:  Ulcer: {Blank single:12846::\"N/A\",\"Diabetic ulcer, limited to breakdown of skin\",\"Diabetic ulcer, fat layer exposed\",\"Diabetic ulcer, muscle necrosis\",\"Diabetic ulcer, bone necrosis\",\"Diabetic ulcer, ***\",\"Pressure ulcer, Stage 1\",\"Pressure ulcer, Stage 2\",\"Pressure ulcer, Stage 3\",\"Pressure ulcer, Stage 4\",\"Pressure ulcer, unstageable\",\"Pressure ulcer, deep tissue injury\",\"Non-Pressure ulcer, limited to breakdown of skin\",\"Non-Pressure ulcer, fat layer exposed\",\"Non-Pressure ulcer, muscle necrosis\",\"Non-Pressure ulcer, ***\"}    Wound: {Blank

## 2023-12-07 DIAGNOSIS — Z79.4 CONTROLLED TYPE 2 DIABETES MELLITUS WITH OTHER SPECIFIED COMPLICATION, WITH LONG-TERM CURRENT USE OF INSULIN (HCC): ICD-10-CM

## 2023-12-07 DIAGNOSIS — I73.9 PVD (PERIPHERAL VASCULAR DISEASE) (HCC): ICD-10-CM

## 2023-12-07 DIAGNOSIS — G89.18 POSTOPERATIVE PAIN: ICD-10-CM

## 2023-12-07 DIAGNOSIS — T87.9 AKA STUMP COMPLICATION (HCC): ICD-10-CM

## 2023-12-07 DIAGNOSIS — E11.69 CONTROLLED TYPE 2 DIABETES MELLITUS WITH OTHER SPECIFIED COMPLICATION, WITH LONG-TERM CURRENT USE OF INSULIN (HCC): ICD-10-CM

## 2023-12-07 DIAGNOSIS — G89.4 CHRONIC PAIN SYNDROME: ICD-10-CM

## 2023-12-07 RX ORDER — OXYCODONE AND ACETAMINOPHEN 7.5; 325 MG/1; MG/1
1 TABLET ORAL EVERY 4 HOURS PRN
Qty: 120 TABLET | Refills: 0 | Status: SHIPPED | OUTPATIENT
Start: 2023-12-07 | End: 2024-01-06

## 2023-12-07 RX ORDER — OXYCODONE HCL 10 MG/1
10 TABLET, FILM COATED, EXTENDED RELEASE ORAL 2 TIMES DAILY
Qty: 60 TABLET | Refills: 0 | Status: SHIPPED | OUTPATIENT
Start: 2023-12-07 | End: 2024-01-06

## 2023-12-22 ENCOUNTER — OFFICE VISIT (OUTPATIENT)
Dept: PODIATRY | Age: 68
End: 2023-12-22

## 2023-12-22 VITALS — HEIGHT: 74 IN | WEIGHT: 245 LBS | BODY MASS INDEX: 31.44 KG/M2

## 2023-12-22 DIAGNOSIS — E11.621 TYPE 2 DIABETES MELLITUS WITH FOOT ULCER, UNSPECIFIED WHETHER LONG TERM INSULIN USE (HCC): ICD-10-CM

## 2023-12-22 DIAGNOSIS — S91.302D OPEN WOUND OF LEFT FOOT, SUBSEQUENT ENCOUNTER: Primary | ICD-10-CM

## 2023-12-22 DIAGNOSIS — S91.002D OPEN WOUND OF LEFT ANKLE, SUBSEQUENT ENCOUNTER: ICD-10-CM

## 2023-12-22 DIAGNOSIS — G60.8 HEREDITARY SENSORY NEUROPATHY: ICD-10-CM

## 2023-12-22 DIAGNOSIS — L97.509 TYPE 2 DIABETES MELLITUS WITH FOOT ULCER, UNSPECIFIED WHETHER LONG TERM INSULIN USE (HCC): ICD-10-CM

## 2023-12-22 DIAGNOSIS — Z91.199 NONCOMPLIANCE: ICD-10-CM

## 2023-12-22 DIAGNOSIS — B35.1 TINEA UNGUIUM: ICD-10-CM

## 2023-12-22 NOTE — PROGRESS NOTES
120 tablet, Rfl: 10    DULoxetine (CYMBALTA) 60 MG extended release capsule, TAKE TWO (2) CAPSULES BY MOUTH EVERY MORNING, Disp: 60 capsule, Rfl: 10    insulin lispro, 1 Unit Dial, (HUMALOG/ADMELOG) 100 UNIT/ML SOPN, CHECK BLOOD SUGAR 3-4 XDAILY & INJECT AS FOLLOWS =0U 121- 150=2U 151-200=5U 201-250=9U 251 -300=12U 301-350=15U 351-400=18U >400=18U & CALL MD MAX 60U/DAY, Disp: 15 mL, Rfl: 10    mirtazapine (REMERON) 7.5 MG tablet, TAKE ONE TABLET BY MOUTH ONCE NIGHTLY, Disp: 90 tablet, Rfl: 3    blood glucose test strips (ONETOUCH ULTRA) strip, USE TO TEST BLOOD SUGAR 4 TIMES DAILY, Disp: 200 strip, Rfl: 10    Lancets (ONETOUCH DELICA PLUS [id number]) MISC, USE TO TEST BLOOD SUGAR 4 TIMES DAILY, Disp: 200 each, Rfl: 10    amLODIPine (NORVASC) 10 MG tablet, TAKE 1 TABLET BY MOUTH ONCE DAILY, Disp: 30 tablet, Rfl: 10    Alcohol Swabs (EASY TOUCH ALCOHOL PREP MEDIUM) 70 % PADS, USE TO TEST BLOOD SUGAR 4 TIMES DAILY, Disp: 200 each, Rfl: 10    chlorthalidone (HYGROTON) 25 MG tablet, TAKE 1 TABLET BY MOUTH DAILY, Disp: 30 tablet, Rfl: 10    ammonium lactate (LAC-HYDRIN) 12 % lotion, Apply topically twice daily, Disp: 400 g, Rfl: 2    vitamin D (ERGOCALCIFEROL) 1.25 MG (29210 UT) CAPS capsule, TAKE 1 CAPSULE BY MOUTH ONCE WEEKLY, Disp: , Rfl:     hydrALAZINE (APRESOLINE) 50 MG tablet, 2 tablets, Disp: , Rfl:     metoprolol tartrate (LOPRESSOR) 25 MG tablet, TAKE 1 TABLET BY MOUTH TWICE DAILY, Disp: 60 tablet, Rfl: 10    cilostazol (PLETAL) 50 MG tablet, TAKE 1 TABLET BY MOUTH 2 TIMES A DAY, Disp: 180 tablet, Rfl: 3    cloNIDine (CATAPRES) 0.1 MG tablet, Take 1 tablet by mouth 3 times daily, Disp: 270 tablet, Rfl: 5    Insulin Pen Needle 31G X 8 MM MISC, 1 each by Does not apply route 5 times daily, Disp: 500 each, Rfl: 2    DROPLET PEN NEEDLES 29G X 12MM MISC, use 1 PEN NEEDLE to inject MEDICATION subcutaneously five times a day, Disp: 200 each, Rfl: 5    mineral oil-hydrophilic petrolatum (AQUAPHOR) ointment, Apply

## 2024-01-01 RX ORDER — CHLORTHALIDONE 25 MG/1
25 TABLET ORAL DAILY
Qty: 30 TABLET | Refills: 3 | Status: SHIPPED
Start: 2024-01-01 | End: 2024-10-22

## 2024-01-01 RX ORDER — CARVEDILOL 6.25 MG/1
6.25 TABLET ORAL 2 TIMES DAILY WITH MEALS
Qty: 180 TABLET | OUTPATIENT
Start: 2024-01-01

## 2024-01-01 RX ORDER — CEFDINIR 300 MG/1
CAPSULE ORAL
Qty: 20 CAPSULE | Refills: 10 | OUTPATIENT
Start: 2024-01-01

## 2024-01-01 RX ORDER — CARVEDILOL 6.25 MG/1
6.25 TABLET ORAL 2 TIMES DAILY WITH MEALS
Qty: 60 TABLET | Refills: 3 | Status: SHIPPED
Start: 2024-01-01 | End: 2024-10-22

## 2024-01-01 RX ORDER — CHLORTHALIDONE 25 MG/1
25 TABLET ORAL DAILY
Qty: 90 TABLET | OUTPATIENT
Start: 2024-01-01

## 2024-01-01 RX ORDER — DOXAZOSIN 2 MG/1
2 TABLET ORAL
Qty: 30 TABLET | Refills: 3 | Status: SHIPPED
Start: 2024-01-01 | End: 2024-10-22

## 2024-01-01 RX ORDER — DOXAZOSIN 2 MG/1
2 TABLET ORAL NIGHTLY
Qty: 90 TABLET | OUTPATIENT
Start: 2024-01-01

## 2024-01-05 ENCOUNTER — OFFICE VISIT (OUTPATIENT)
Dept: PODIATRY | Age: 69
End: 2024-01-05
Payer: COMMERCIAL

## 2024-01-05 VITALS — HEIGHT: 74 IN | WEIGHT: 245 LBS | BODY MASS INDEX: 31.44 KG/M2

## 2024-01-05 DIAGNOSIS — L97.509 TYPE 2 DIABETES MELLITUS WITH FOOT ULCER, UNSPECIFIED WHETHER LONG TERM INSULIN USE (HCC): ICD-10-CM

## 2024-01-05 DIAGNOSIS — G60.8 HEREDITARY SENSORY NEUROPATHY: ICD-10-CM

## 2024-01-05 DIAGNOSIS — S91.002D OPEN WOUND OF LEFT ANKLE, SUBSEQUENT ENCOUNTER: Primary | ICD-10-CM

## 2024-01-05 DIAGNOSIS — E11.621 TYPE 2 DIABETES MELLITUS WITH FOOT ULCER, UNSPECIFIED WHETHER LONG TERM INSULIN USE (HCC): ICD-10-CM

## 2024-01-05 PROCEDURE — G8427 DOCREV CUR MEDS BY ELIG CLIN: HCPCS | Performed by: PODIATRIST

## 2024-01-05 PROCEDURE — G8484 FLU IMMUNIZE NO ADMIN: HCPCS | Performed by: PODIATRIST

## 2024-01-05 PROCEDURE — 1124F ACP DISCUSS-NO DSCNMKR DOCD: CPT | Performed by: PODIATRIST

## 2024-01-05 PROCEDURE — 2022F DILAT RTA XM EVC RTNOPTHY: CPT | Performed by: PODIATRIST

## 2024-01-05 PROCEDURE — 99213 OFFICE O/P EST LOW 20 MIN: CPT | Performed by: PODIATRIST

## 2024-01-05 PROCEDURE — 4004F PT TOBACCO SCREEN RCVD TLK: CPT | Performed by: PODIATRIST

## 2024-01-05 PROCEDURE — 3046F HEMOGLOBIN A1C LEVEL >9.0%: CPT | Performed by: PODIATRIST

## 2024-01-05 PROCEDURE — 3017F COLORECTAL CA SCREEN DOC REV: CPT | Performed by: PODIATRIST

## 2024-01-05 PROCEDURE — G8417 CALC BMI ABV UP PARAM F/U: HCPCS | Performed by: PODIATRIST

## 2024-01-05 NOTE — PROGRESS NOTES
Patient is here today to follow up on a ulcer to the left ankle.  He continues to go to wound care and has a nurse at his house every day.  PCP is Dr. Juanito Kline, last seen 12/07/2023.       CC:   Diabetic open wound left ankle    HPI:   Seen today diabetic open wound left ankle.  Denies nausea vomiting fever chills shortness of breath.  Does have upcoming appointment with wound care and vascular surgery team.  Does have home health care coming out to the house.  Denies any pain.  Denies any recent injury or trauma.    ROS:  Const: Denies constitutional symptoms  Musculo: Denies symptoms other than stated above  Skin: Denies symptoms other than stated above      Current Outpatient Medications:     primidone (MYSOLINE) 50 MG tablet, Take 1 tablet by mouth nightly, Disp: 90 tablet, Rfl: 0    ammonium lactate (LAC-HYDRIN) 12 % lotion, APPLY TOPICALLY TWICE DAILY AS NEEDED, Disp: 400 g, Rfl: 10    insulin glargine (LANTUS SOLOSTAR) 100 UNIT/ML injection pen, INJECT 10 UNITS SUBCUTANEOUSLY IN THE MORNING AND 30 UNITS EVERY EVENING, Disp: 15 mL, Rfl: 10    gabapentin (NEURONTIN) 800 MG tablet, TAKE 1 TABLET BY MOUTH FOUR TIMES DAILY, Disp: 120 tablet, Rfl: 10    DULoxetine (CYMBALTA) 60 MG extended release capsule, TAKE TWO (2) CAPSULES BY MOUTH EVERY MORNING, Disp: 60 capsule, Rfl: 10    insulin lispro, 1 Unit Dial, (HUMALOG/ADMELOG) 100 UNIT/ML SOPN, CHECK BLOOD SUGAR 3-4 XDAILY & INJECT AS FOLLOWS =0U 121- 150=2U 151-200=5U 201-250=9U 251 -300=12U 301-350=15U 351-400=18U >400=18U & CALL MD MAX 60U/DAY, Disp: 15 mL, Rfl: 10    mirtazapine (REMERON) 7.5 MG tablet, TAKE ONE TABLET BY MOUTH ONCE NIGHTLY, Disp: 90 tablet, Rfl: 3    blood glucose test strips (ONETOUCH ULTRA) strip, USE TO TEST BLOOD SUGAR 4 TIMES DAILY, Disp: 200 strip, Rfl: 10    Lancets (ONETOUCH DELICA PLUS [id number]) MISC, USE TO TEST BLOOD SUGAR 4 TIMES DAILY, Disp: 200 each, Rfl: 10    amLODIPine (NORVASC) 10 MG tablet, TAKE 1 TABLET BY MOUTH

## 2024-01-09 NOTE — DISCHARGE INSTRUCTIONS
Visit Discharge/Physician Orders     Discharge condition: Stable     Assessment of pain at discharge: yes     Anesthetic used: 4% lidocaine      Discharge to: Home     Left via:Private automobile     Accompanied by: family     ECF/HHA: [facility]     Dressing Orders: Left lateral ankle: Cleanse wound with normal saline. Cover wound with silver alginate. Secure with abd and dry dressing. Apply spandagrip.      APPLY SPANDAGRIP'S IN THE MORNING AND REMOVE AT BEDTIME.  ELEVATE LEGS ABOVE THE LEVEL OF THE HEART 3-4 TIMES A DAY FOR AN HOUR. HAND WASH SPANDAGRIP'S AND LINE DRY.          Treatment Orders: Eat a diet high in protein and vitamin C. Take a multiple vitamin daily unless contraindicated.      Follow up with Dr. Brooks      [facility] followup visit ______6 weeks with Dr. KAMINSKI________________  (Please note your next appointment above and if you are unable to keep, kindly give a 24 hour notice. Thank you.)     Physician signature:__________________________        If you experience any of the following, please call the Wound Care Center during business hours:     * Increase in Pain  * Temperature over 101  * Increase in drainage from your wound  * Drainage with a foul odor  * Bleeding  * Increase in swelling  * Need for compression bandage changes due to slippage, breakthrough drainage.     If you need medical attention outside of the business hours of the Wound Care Centers please contact your PCP or go to the nearest emergency room.

## 2024-01-10 ENCOUNTER — HOSPITAL ENCOUNTER (OUTPATIENT)
Dept: WOUND CARE | Age: 69
Discharge: HOME | End: 2024-01-10

## 2024-01-10 ENCOUNTER — TELEPHONE (OUTPATIENT)
Dept: PRIMARY CARE CLINIC | Age: 69
End: 2024-01-10

## 2024-01-10 DIAGNOSIS — T87.9 AKA STUMP COMPLICATION (HCC): ICD-10-CM

## 2024-01-10 DIAGNOSIS — G89.18 POSTOPERATIVE PAIN: ICD-10-CM

## 2024-01-10 DIAGNOSIS — E11.69 CONTROLLED TYPE 2 DIABETES MELLITUS WITH OTHER SPECIFIED COMPLICATION, WITH LONG-TERM CURRENT USE OF INSULIN (HCC): ICD-10-CM

## 2024-01-10 DIAGNOSIS — G89.4 CHRONIC PAIN SYNDROME: ICD-10-CM

## 2024-01-10 DIAGNOSIS — Z79.4 CONTROLLED TYPE 2 DIABETES MELLITUS WITH OTHER SPECIFIED COMPLICATION, WITH LONG-TERM CURRENT USE OF INSULIN (HCC): ICD-10-CM

## 2024-01-10 DIAGNOSIS — I73.9 PVD (PERIPHERAL VASCULAR DISEASE) (HCC): ICD-10-CM

## 2024-01-10 RX ORDER — OXYCODONE HCL 10 MG/1
10 TABLET, FILM COATED, EXTENDED RELEASE ORAL 2 TIMES DAILY
Qty: 60 TABLET | Refills: 0 | Status: SHIPPED | OUTPATIENT
Start: 2024-01-10 | End: 2024-02-09

## 2024-01-10 RX ORDER — OXYCODONE AND ACETAMINOPHEN 7.5; 325 MG/1; MG/1
1 TABLET ORAL EVERY 4 HOURS PRN
Qty: 120 TABLET | Refills: 0 | Status: SHIPPED | OUTPATIENT
Start: 2024-01-10 | End: 2024-02-09

## 2024-01-10 NOTE — TELEPHONE ENCOUNTER
----- Message from Radha Khan sent at 1/9/2024  2:45 PM EST -----  Subject: Refill Request    QUESTIONS  Name of Medication? oxyCODONE (OXYCONTIN) 10 MG extended release tablet  Patient-reported dosage and instructions? 10 MG / Twice daily  How many days do you have left? 6  Preferred Pharmacy? [facility] #[id number]  Pharmacy phone number (if available)? [phone]  ---------------------------------------------------------------------------  --------------,  Name of Medication? oxyCODONE-acetaminophen (PERCOCET) 7.5-325 MG per   tablet  Patient-reported dosage and instructions? 7.5 - 325 MG / 4 times daily  How many days do you have left? 1  Preferred Pharmacy? [facility] #[id number]  Pharmacy phone number (if available)? [phone]  ---------------------------------------------------------------------------  --------------  CALL BACK INFO  What is the best way for the office to contact you? OK to leave message on   voicemail  Preferred Call Back Phone Number? [phone]  ---------------------------------------------------------------------------  --------------  SCRIPT ANSWERS  Relationship to Patient? Self

## 2024-01-10 NOTE — TELEPHONE ENCOUNTER
Refill request      Last Appointment:  9/25/2023    Future appts:  Future Appointments   Date Time Provider Department Center   1/10/2024  1:00 PM Sydnee Baca MD [facility]   1/17/2024 11:00 AM Omar Brooks DPM [facility]   2/13/2024 11:00 AM Juanito Kline DO [facility]

## 2024-01-16 NOTE — DISCHARGE INSTRUCTIONS
Written       Visit Discharge/Physician Orders     Discharge condition: Stable     Assessment of pain at discharge: yes     Anesthetic used: 4% lidocaine      Discharge to: Home     Left via:Private automobile     Accompanied by: family     ECF/HHA: [facility] *NEW ORDERS*     Dressing Orders: Left lateral ankle: Cleanse wound with normal saline. Cover wound with drawtex. APPLY COBAN 2 WRAP TO  LEG. Change M-W (in [facility])-F.  IF WRAP FALLS 2 INCHES, OR IF PAIN INCREASES AND BECOMES INTOLERABLE,  REMOVE WRAP AND APPLY DOUBLE TUBIGRIP.  CALL WOUND CARE CENTER.         Treatment Orders: Eat a diet high in protein and vitamin C. Take a multiple vitamin daily unless contraindicated.     Elevate leg as much as possible.     Follow up with Dr. Brooks for foot care     [facility] followup visit ______ 1 week with Dr. KAMINSKI________________  (Please note your next appointment above and if you are unable to keep, kindly give a 24 hour notice. Thank you.)     Physician signature:__________________________        If you experience any of the following, please call the Wound Care Center during business hours:     * Increase in Pain  * Temperature over 101  * Increase in drainage from your wound  * Drainage with a foul odor  * Bleeding  * Increase in swelling  * Need for compression bandage changes due to slippage, breakthrough drainage.     If you need medical attention outside of the business hours of the Wound Care Centers please contact your PCP or go to the nearest emergency room.

## 2024-01-17 ENCOUNTER — HOSPITAL ENCOUNTER (OUTPATIENT)
Dept: WOUND CARE | Age: 69
Discharge: HOME | End: 2024-01-17
Payer: COMMERCIAL

## 2024-01-17 VITALS
DIASTOLIC BLOOD PRESSURE: 84 MMHG | TEMPERATURE: 98.4 F | HEART RATE: 86 BPM | SYSTOLIC BLOOD PRESSURE: 185 MMHG | RESPIRATION RATE: 20 BRPM

## 2024-01-17 DIAGNOSIS — L97.224 DIABETIC ULCER OF LEFT CALF ASSOCIATED WITH TYPE 2 DIABETES MELLITUS, WITH NECROSIS OF BONE (HCC): ICD-10-CM

## 2024-01-17 DIAGNOSIS — M86.462 CHRONIC OSTEOMYELITIS OF LEFT FIBULA WITH DRAINING SINUS (HCC): Primary | ICD-10-CM

## 2024-01-17 DIAGNOSIS — I70.243 ATHEROSCLEROSIS OF NATIVE ARTERY OF LEFT LEG WITH ULCERATION OF ANKLE (HCC): ICD-10-CM

## 2024-01-17 DIAGNOSIS — E11.622 DIABETIC ULCER OF LEFT CALF ASSOCIATED WITH TYPE 2 DIABETES MELLITUS, WITH NECROSIS OF BONE (HCC): ICD-10-CM

## 2024-01-17 PROCEDURE — 11043 DBRDMT MUSC&/FSCA 1ST 20/<: CPT | Performed by: SURGERY

## 2024-01-17 PROCEDURE — 11046 DBRDMT MUSC&/FSCA EA ADDL: CPT

## 2024-01-17 PROCEDURE — 11046 DBRDMT MUSC&/FSCA EA ADDL: CPT | Performed by: SURGERY

## 2024-01-17 PROCEDURE — 11043 DBRDMT MUSC&/FSCA 1ST 20/<: CPT

## 2024-01-17 RX ORDER — LIDOCAINE HYDROCHLORIDE 40 MG/ML
SOLUTION TOPICAL ONCE
Status: COMPLETED | OUTPATIENT
Start: 2024-01-17 | End: 2024-01-17

## 2024-01-17 RX ORDER — LIDOCAINE HYDROCHLORIDE 20 MG/ML
JELLY TOPICAL ONCE
OUTPATIENT
Start: 2024-01-17 | End: 2024-01-17

## 2024-01-17 RX ORDER — IBUPROFEN 200 MG
TABLET ORAL ONCE
OUTPATIENT
Start: 2024-01-17 | End: 2024-01-17

## 2024-01-17 RX ORDER — BETAMETHASONE DIPROPIONATE 0.5 MG/G
CREAM TOPICAL ONCE
OUTPATIENT
Start: 2024-01-17 | End: 2024-01-17

## 2024-01-17 RX ORDER — LIDOCAINE 40 MG/G
CREAM TOPICAL ONCE
OUTPATIENT
Start: 2024-01-17 | End: 2024-01-17

## 2024-01-17 RX ORDER — GENTAMICIN SULFATE 1 MG/G
OINTMENT TOPICAL ONCE
OUTPATIENT
Start: 2024-01-17 | End: 2024-01-17

## 2024-01-17 RX ORDER — SODIUM CHLOR/HYPOCHLOROUS ACID 0.033 %
SOLUTION, IRRIGATION IRRIGATION ONCE
OUTPATIENT
Start: 2024-01-17 | End: 2024-01-17

## 2024-01-17 RX ORDER — LIDOCAINE 50 MG/G
OINTMENT TOPICAL ONCE
OUTPATIENT
Start: 2024-01-17 | End: 2024-01-17

## 2024-01-17 RX ORDER — TRIAMCINOLONE ACETONIDE 1 MG/G
OINTMENT TOPICAL ONCE
OUTPATIENT
Start: 2024-01-17 | End: 2024-01-17

## 2024-01-17 RX ORDER — CLOBETASOL PROPIONATE 0.5 MG/G
OINTMENT TOPICAL ONCE
OUTPATIENT
Start: 2024-01-17 | End: 2024-01-17

## 2024-01-17 RX ORDER — BACITRACIN ZINC AND POLYMYXIN B SULFATE 500; 1000 [USP'U]/G; [USP'U]/G
OINTMENT TOPICAL ONCE
OUTPATIENT
Start: 2024-01-17 | End: 2024-01-17

## 2024-01-17 RX ORDER — BACITRACIN ZINC 500 [USP'U]/G
OINTMENT TOPICAL ONCE
OUTPATIENT
Start: 2024-01-17 | End: 2024-01-17

## 2024-01-17 RX ORDER — LIDOCAINE HYDROCHLORIDE 40 MG/ML
SOLUTION TOPICAL ONCE
OUTPATIENT
Start: 2024-01-17 | End: 2024-01-17

## 2024-01-17 RX ADMIN — LIDOCAINE HYDROCHLORIDE 10 ML: 40 SOLUTION TOPICAL at 13:49

## 2024-01-17 ASSESSMENT — PAIN DESCRIPTION - LOCATION: LOCATION: OTHER (COMMENT)

## 2024-01-17 ASSESSMENT — PAIN DESCRIPTION - PAIN TYPE: TYPE: CHRONIC PAIN

## 2024-01-17 ASSESSMENT — PAIN DESCRIPTION - FREQUENCY: FREQUENCY: CONTINUOUS

## 2024-01-17 ASSESSMENT — PAIN DESCRIPTION - ONSET: ONSET: ON-GOING

## 2024-01-17 ASSESSMENT — PAIN DESCRIPTION - ORIENTATION: ORIENTATION: RIGHT

## 2024-01-17 ASSESSMENT — PAIN DESCRIPTION - DESCRIPTORS: DESCRIPTORS: CRAMPING;OTHER (COMMENT)

## 2024-01-17 NOTE — PROGRESS NOTES
TAKE 1 TABLET BY MOUTH FOUR TIMES DAILY 120 tablet 10    DULoxetine (CYMBALTA) 60 MG extended release capsule TAKE TWO (2) CAPSULES BY MOUTH EVERY MORNING 60 capsule 10    insulin lispro, 1 Unit Dial, (HUMALOG/ADMELOG) 100 UNIT/ML SOPN CHECK BLOOD SUGAR 3-4 XDAILY & INJECT AS FOLLOWS =0U 121- 150=2U 151-200=5U 201-250=9U 251 -300=12U 301-350=15U 351-400=18U >400=18U & CALL MD MAX 60U/DAY 15 mL 10    mirtazapine (REMERON) 7.5 MG tablet TAKE ONE TABLET BY MOUTH ONCE NIGHTLY 90 tablet 3    blood glucose test strips (ONETOUCH ULTRA) strip USE TO TEST BLOOD SUGAR 4 TIMES DAILY 200 strip 10    Lancets (ONETOUCH DELICA PLUS [id number]) MISC USE TO TEST BLOOD SUGAR 4 TIMES DAILY 200 each 10    amLODIPine (NORVASC) 10 MG tablet TAKE 1 TABLET BY MOUTH ONCE DAILY 30 tablet 10    Alcohol Swabs (EASY TOUCH ALCOHOL PREP MEDIUM) 70 % PADS USE TO TEST BLOOD SUGAR 4 TIMES DAILY 200 each 10    chlorthalidone (HYGROTON) 25 MG tablet TAKE 1 TABLET BY MOUTH DAILY 30 tablet 10    ammonium lactate (LAC-HYDRIN) 12 % lotion Apply topically twice daily 400 g 2    vitamin D (ERGOCALCIFEROL) 1.25 MG (17400 UT) CAPS capsule TAKE 1 CAPSULE BY MOUTH ONCE WEEKLY      hydrALAZINE (APRESOLINE) 50 MG tablet 2 tablets      metoprolol tartrate (LOPRESSOR) 25 MG tablet TAKE 1 TABLET BY MOUTH TWICE DAILY 60 tablet 10    cilostazol (PLETAL) 50 MG tablet TAKE 1 TABLET BY MOUTH 2 TIMES A  tablet 3    cloNIDine (CATAPRES) 0.1 MG tablet Take 1 tablet by mouth 3 times daily 270 tablet 5    Insulin Pen Needle 31G X 8 MM MISC 1 each by Does not apply route 5 times daily 500 each 2    DROPLET PEN NEEDLES 29G X 12MM MISC use 1 PEN NEEDLE to inject MEDICATION subcutaneously five times a day 200 each 5    mineral oil-hydrophilic petrolatum (AQUAPHOR) ointment Apply topically once a week 396 g 5    Insulin Syringe-Needle U-100 (ULTICARE INSULIN SYRINGE) 31G X 5/16\" 0.3 ML MISC 1 each by Other route 5 times daily 500 each 3    naloxone 4 MG/0.1ML LIQD

## 2024-01-17 NOTE — PLAN OF CARE
Problem: Chronic Conditions and Co-morbidities  Goal: Patient's chronic conditions and co-morbidity symptoms are monitored and maintained or improved  Outcome: Progressing     Problem: Cognitive:  Goal: Knowledge of wound care  Description: Knowledge of wound care  Outcome: Progressing  Goal: Understands risk factors for wounds  Description: Understands risk factors for wounds  Outcome: Progressing     Problem: Wound:  Goal: Will show signs of wound healing; wound closure and no evidence of infection  Description: Will show signs of wound healing; wound closure and no evidence of infection  Outcome: Progressing     Problem: Blood Glucose:  Goal: Ability to maintain appropriate glucose levels will improve  Description: Ability to maintain appropriate glucose levels will improve  Outcome: Progressing

## 2024-01-24 ENCOUNTER — HOSPITAL ENCOUNTER (OUTPATIENT)
Dept: WOUND CARE | Age: 69
Discharge: HOME | End: 2024-01-24
Payer: COMMERCIAL

## 2024-01-24 VITALS
TEMPERATURE: 97.4 F | BODY MASS INDEX: 31.44 KG/M2 | WEIGHT: 245 LBS | RESPIRATION RATE: 20 BRPM | DIASTOLIC BLOOD PRESSURE: 80 MMHG | HEART RATE: 85 BPM | HEIGHT: 74 IN | SYSTOLIC BLOOD PRESSURE: 132 MMHG

## 2024-01-24 DIAGNOSIS — I70.243 ATHEROSCLEROSIS OF NATIVE ARTERY OF LEFT LEG WITH ULCERATION OF ANKLE (HCC): ICD-10-CM

## 2024-01-24 DIAGNOSIS — L97.224 DIABETIC ULCER OF LEFT CALF ASSOCIATED WITH TYPE 2 DIABETES MELLITUS, WITH NECROSIS OF BONE (HCC): ICD-10-CM

## 2024-01-24 DIAGNOSIS — M86.462 CHRONIC OSTEOMYELITIS OF LEFT FIBULA WITH DRAINING SINUS (HCC): Primary | ICD-10-CM

## 2024-01-24 DIAGNOSIS — E11.622 DIABETIC ULCER OF LEFT CALF ASSOCIATED WITH TYPE 2 DIABETES MELLITUS, WITH NECROSIS OF BONE (HCC): ICD-10-CM

## 2024-01-24 PROCEDURE — 11043 DBRDMT MUSC&/FSCA 1ST 20/<: CPT

## 2024-01-24 PROCEDURE — 11046 DBRDMT MUSC&/FSCA EA ADDL: CPT

## 2024-01-24 RX ORDER — LIDOCAINE HYDROCHLORIDE 20 MG/ML
JELLY TOPICAL ONCE
OUTPATIENT
Start: 2024-01-24 | End: 2024-01-24

## 2024-01-24 RX ORDER — CLOBETASOL PROPIONATE 0.5 MG/G
OINTMENT TOPICAL ONCE
OUTPATIENT
Start: 2024-01-24 | End: 2024-01-24

## 2024-01-24 RX ORDER — LIDOCAINE 40 MG/G
CREAM TOPICAL ONCE
OUTPATIENT
Start: 2024-01-24 | End: 2024-01-24

## 2024-01-24 RX ORDER — IBUPROFEN 200 MG
TABLET ORAL ONCE
OUTPATIENT
Start: 2024-01-24 | End: 2024-01-24

## 2024-01-24 RX ORDER — GENTAMICIN SULFATE 1 MG/G
OINTMENT TOPICAL ONCE
OUTPATIENT
Start: 2024-01-24 | End: 2024-01-24

## 2024-01-24 RX ORDER — BACITRACIN ZINC AND POLYMYXIN B SULFATE 500; 1000 [USP'U]/G; [USP'U]/G
OINTMENT TOPICAL ONCE
OUTPATIENT
Start: 2024-01-24 | End: 2024-01-24

## 2024-01-24 RX ORDER — LIDOCAINE 50 MG/G
OINTMENT TOPICAL ONCE
OUTPATIENT
Start: 2024-01-24 | End: 2024-01-24

## 2024-01-24 RX ORDER — LIDOCAINE HYDROCHLORIDE 40 MG/ML
SOLUTION TOPICAL ONCE
OUTPATIENT
Start: 2024-01-24 | End: 2024-01-24

## 2024-01-24 RX ORDER — SODIUM CHLOR/HYPOCHLOROUS ACID 0.033 %
SOLUTION, IRRIGATION IRRIGATION ONCE
OUTPATIENT
Start: 2024-01-24 | End: 2024-01-24

## 2024-01-24 RX ORDER — TRIAMCINOLONE ACETONIDE 1 MG/G
OINTMENT TOPICAL ONCE
OUTPATIENT
Start: 2024-01-24 | End: 2024-01-24

## 2024-01-24 RX ORDER — LIDOCAINE HYDROCHLORIDE 40 MG/ML
SOLUTION TOPICAL ONCE
Status: COMPLETED | OUTPATIENT
Start: 2024-01-24 | End: 2024-01-24

## 2024-01-24 RX ORDER — BETAMETHASONE DIPROPIONATE 0.5 MG/G
CREAM TOPICAL ONCE
OUTPATIENT
Start: 2024-01-24 | End: 2024-01-24

## 2024-01-24 RX ORDER — BACITRACIN ZINC 500 [USP'U]/G
OINTMENT TOPICAL ONCE
OUTPATIENT
Start: 2024-01-24 | End: 2024-01-24

## 2024-01-24 RX ADMIN — LIDOCAINE HYDROCHLORIDE: 40 SOLUTION TOPICAL at 13:29

## 2024-01-24 NOTE — PROGRESS NOTES
(obstructive sleep apnea)     Osteomyelitis ([facility]) 10/24/2018    Osteomyelitis of right lower limb (Former[facility]) 10/24/2018    Pain syndrome, chronic     Postoperative wound infection     PVD (peripheral vascular disease) (Former[facility])     Tobacco dependence     Vascular occlusion     Vitamin D deficiency     Wound infection 04/16/2020     Past Surgical History:   Procedure Laterality Date    AMPUTATION ABOVE KNEE Right 4/28/2020    DEBRIDEMENT OF AMPUTATION RIGHT ABOVE KNEE performed by Sydnee Baca MD at [facility]    AMPUTATION ABOVE KNEE Right 4/30/2020    DEBRIDEMENT OF AMPUTATION RIGHT ABOVE KNEE,  POSS. ABOVE KNEE REVISION, POSS. CLOSURE, POSS.WOUND VAC -- DRS. CASH / CAROL TO LOOK IN performed by Sydnee Baca MD at [facility]    FEMORAL BYPASS      Right - [place], [place]    FEMORAL ENDARTERECTOMY Right 3/20/2020    RIGHT LOWER EXTREMITY THROMBECTOMY, POSSIBLE ANGIOGRAM, POSSIBLE INTERVENTION, POSSIBLE BYPASS. performed by Sydnee Baca MD at [facility]    LEG SURGERY Right 4/17/2020    RIGHT LEG DEBRIDEMENT INCISION AND DRAINAGE performed by Ken Gaviria MD at [facility]    LEG SURGERY Right 4/20/2020    RIGHT THIGH WOUND DRESSING CHANGE; DEBRIDEMENT, removal of muscle performed by Ken Gaviria MD at [facility]    LEG SURGERY Right 4/21/2020    RIGHT THIGH WOUND DRESSING CHANGE POSSIBLE  DEBRIDEMENT - NEEDS DRS. CASH / JANETTE TO SEE performed by Wes Pretty MD at [facility]    LEG SURGERY Right 4/23/2020    RIGHT THIGH WOUND DRESSING CHANGE and DEBRIDEMENT performed by Sydnee Baca MD at [facility]    LEG SURGERY Right 10/15/2020    DEBRIDEMENT RIGHT ABOVE KNEE AMPUTATION POSS. REVISION POSS. WOUND VAC performed by Sydnee Baca MD at [facility]    LEG SURGERY Right 12/17/2020    DEBRIDEMENT  RIGHT ABOVE KNEE AMPUTATION WITH POSS. ADVANCED SKIN THERAPY performed by Sydnee Baca MD at [facility]    LEG SURGERY Right 5/14/2021    DEBRIDEMENT RIGHT ABOVE KNEE

## 2024-01-24 NOTE — DISCHARGE INSTRUCTIONS
Written         Visit Discharge/Physician Orders     Discharge condition: Stable     Assessment of pain at discharge: yes     Anesthetic used: 4% lidocaine      Discharge to: Home     Left via:Private automobile     Accompanied by: family     ECF/HHA: [facility]      Dressing Orders: Left lateral ankle: Cleanse wound with normal saline. Cover wound with drawtex. APPLY COBAN 2 WRAP TO  LEG. Change M-W (in [facility])-F.  IF WRAP FALLS 2 INCHES, OR IF PAIN INCREASES AND BECOMES INTOLERABLE,  REMOVE WRAP AND APPLY DOUBLE TUBIGRIP.  CALL WOUND CARE CENTER.         Treatment Orders: Eat a diet high in protein and vitamin C. Take a multiple vitamin daily unless contraindicated.      Elevate leg as much as possible.     Follow up with Dr. Brooks for foot care     [facility] followup visit ______ 1 week with Dr. KAMINSKI________________  (Please note your next appointment above and if you are unable to keep, kindly give a 24 hour notice. Thank you.)     Physician signature:__________________________        If you experience any of the following, please call the Wound Care Center during business hours:     * Increase in Pain  * Temperature over 101  * Increase in drainage from your wound  * Drainage with a foul odor  * Bleeding  * Increase in swelling  * Need for compression bandage changes due to slippage, breakthrough drainage.     If you need medical attention outside of the business hours of the Wound Care Centers please contact your PCP or go to the nearest emergency room.

## 2024-01-30 NOTE — DISCHARGE INSTRUCTIONS
Written         Visit Discharge/Physician Orders     Discharge condition: Stable     Assessment of pain at discharge: yes     Anesthetic used: 4% lidocaine      Discharge to: Home     Left via:Private automobile     Accompanied by: family     ECF/HHA: [facility] *NEW ORDERS*     Dressing Orders: Left lateral ankle: Cleanse wound with normal saline. COVER WITH DRAWTEX. APPLY COBAN 2 WRAP TO  LEG. Change M-W (in [facility])-F.  IF WRAP FALLS 2 INCHES, OR IF PAIN INCREASES AND BECOMES INTOLERABLE,  REMOVE WRAP AND APPLY DOUBLE TUBIGRIP.  CALL WOUND CARE CENTER.         Treatment Orders: Eat a diet high in protein and vitamin C. Take a multiple vitamin daily unless contraindicated.      Elevate leg as much as possible.     Follow up with Dr. Brooks for foot care     [facility] followup visit ______ 1 week with Dr. KAMINSKI________________  (Please note your next appointment above and if you are unable to keep, kindly give a 24 hour notice. Thank you.)     Physician signature:__________________________        If you experience any of the following, please call the Wound Care Center during business hours:     * Increase in Pain  * Temperature over 101  * Increase in drainage from your wound  * Drainage with a foul odor  * Bleeding  * Increase in swelling  * Need for compression bandage changes due to slippage, breakthrough drainage.     If you need medical attention outside of the business hours of the Wound Care Centers please contact your PCP or go to the nearest emergency room.

## 2024-01-31 ENCOUNTER — HOSPITAL ENCOUNTER (OUTPATIENT)
Dept: WOUND CARE | Age: 69
Discharge: HOME | End: 2024-01-31
Payer: COMMERCIAL

## 2024-01-31 VITALS
RESPIRATION RATE: 18 BRPM | TEMPERATURE: 98.4 F | WEIGHT: 245 LBS | BODY MASS INDEX: 31.44 KG/M2 | SYSTOLIC BLOOD PRESSURE: 142 MMHG | HEIGHT: 74 IN | DIASTOLIC BLOOD PRESSURE: 69 MMHG | HEART RATE: 82 BPM

## 2024-01-31 DIAGNOSIS — E11.622 DIABETIC ULCER OF LEFT CALF ASSOCIATED WITH TYPE 2 DIABETES MELLITUS, WITH NECROSIS OF BONE (HCC): ICD-10-CM

## 2024-01-31 DIAGNOSIS — M86.462 CHRONIC OSTEOMYELITIS OF LEFT FIBULA WITH DRAINING SINUS (HCC): Primary | ICD-10-CM

## 2024-01-31 DIAGNOSIS — I70.243 ATHEROSCLEROSIS OF NATIVE ARTERY OF LEFT LEG WITH ULCERATION OF ANKLE (HCC): ICD-10-CM

## 2024-01-31 DIAGNOSIS — L97.224 DIABETIC ULCER OF LEFT CALF ASSOCIATED WITH TYPE 2 DIABETES MELLITUS, WITH NECROSIS OF BONE (HCC): ICD-10-CM

## 2024-01-31 PROCEDURE — 11046 DBRDMT MUSC&/FSCA EA ADDL: CPT | Performed by: SURGERY

## 2024-01-31 PROCEDURE — 11045 DBRDMT SUBQ TISS EACH ADDL: CPT

## 2024-01-31 PROCEDURE — 11043 DBRDMT MUSC&/FSCA 1ST 20/<: CPT | Performed by: SURGERY

## 2024-01-31 PROCEDURE — 11042 DBRDMT SUBQ TIS 1ST 20SQCM/<: CPT

## 2024-01-31 RX ORDER — TRIAMCINOLONE ACETONIDE 1 MG/G
OINTMENT TOPICAL ONCE
OUTPATIENT
Start: 2024-01-31 | End: 2024-01-31

## 2024-01-31 RX ORDER — LIDOCAINE HYDROCHLORIDE 40 MG/ML
SOLUTION TOPICAL ONCE
Status: COMPLETED | OUTPATIENT
Start: 2024-01-31 | End: 2024-01-31

## 2024-01-31 RX ORDER — LIDOCAINE HYDROCHLORIDE 40 MG/ML
SOLUTION TOPICAL ONCE
OUTPATIENT
Start: 2024-01-31 | End: 2024-01-31

## 2024-01-31 RX ORDER — SODIUM CHLOR/HYPOCHLOROUS ACID 0.033 %
SOLUTION, IRRIGATION IRRIGATION ONCE
OUTPATIENT
Start: 2024-01-31 | End: 2024-01-31

## 2024-01-31 RX ORDER — BACITRACIN ZINC 500 [USP'U]/G
OINTMENT TOPICAL ONCE
OUTPATIENT
Start: 2024-01-31 | End: 2024-01-31

## 2024-01-31 RX ORDER — CLOBETASOL PROPIONATE 0.5 MG/G
OINTMENT TOPICAL ONCE
OUTPATIENT
Start: 2024-01-31 | End: 2024-01-31

## 2024-01-31 RX ORDER — BACITRACIN ZINC AND POLYMYXIN B SULFATE 500; 1000 [USP'U]/G; [USP'U]/G
OINTMENT TOPICAL ONCE
OUTPATIENT
Start: 2024-01-31 | End: 2024-01-31

## 2024-01-31 RX ORDER — GENTAMICIN SULFATE 1 MG/G
OINTMENT TOPICAL ONCE
OUTPATIENT
Start: 2024-01-31 | End: 2024-01-31

## 2024-01-31 RX ORDER — LIDOCAINE HYDROCHLORIDE 20 MG/ML
JELLY TOPICAL ONCE
OUTPATIENT
Start: 2024-01-31 | End: 2024-01-31

## 2024-01-31 RX ORDER — BETAMETHASONE DIPROPIONATE 0.5 MG/G
CREAM TOPICAL ONCE
OUTPATIENT
Start: 2024-01-31 | End: 2024-01-31

## 2024-01-31 RX ORDER — LIDOCAINE 40 MG/G
CREAM TOPICAL ONCE
OUTPATIENT
Start: 2024-01-31 | End: 2024-01-31

## 2024-01-31 RX ORDER — IBUPROFEN 200 MG
TABLET ORAL ONCE
OUTPATIENT
Start: 2024-01-31 | End: 2024-01-31

## 2024-01-31 RX ORDER — LIDOCAINE 50 MG/G
OINTMENT TOPICAL ONCE
OUTPATIENT
Start: 2024-01-31 | End: 2024-01-31

## 2024-01-31 RX ADMIN — LIDOCAINE HYDROCHLORIDE 15 ML: 40 SOLUTION TOPICAL at 14:25

## 2024-01-31 NOTE — PROGRESS NOTES
pressure    Procedural Pain:  7  / 10   Post Procedural Pain:  6 / 10     Response to treatment:  Well tolerated by patient.     Plan:   Treatment Note please see attached Discharge Instructions    Written patient dismissal instructions given to patient and signed by patient or POA.         Discharge Instructions             Written         Visit Discharge/Physician Orders     Discharge condition: Stable     Assessment of pain at discharge: yes     Anesthetic used: 4% lidocaine      Discharge to: Home     Left via:Private automobile     Accompanied by: family     ECF/HHA: [facility] care *NEW ORDERS*     Dressing Orders: Left lateral ankle: Cleanse wound with normal saline. COVER WITH DRAWTEX. APPLY COBAN 2 WRAP TO  LEG. Change M-W (in [facility])-F.  IF WRAP FALLS 2 INCHES, OR IF PAIN INCREASES AND BECOMES INTOLERABLE,  REMOVE WRAP AND APPLY DOUBLE TUBIGRIP.  CALL WOUND CARE CENTER.         Treatment Orders: Eat a diet high in protein and vitamin C. Take a multiple vitamin daily unless contraindicated.      Elevate leg as much as possible.     Follow up with Dr. Brooks for foot care     [facility] followup visit ______ 1 week with Dr. KAMINSKI________________  (Please note your next appointment above and if you are unable to keep, kindly give a 24 hour notice. Thank you.)     Physician signature:__________________________        If you experience any of the following, please call the Wound Care Center during business hours:     * Increase in Pain  * Temperature over 101  * Increase in drainage from your wound  * Drainage with a foul odor  * Bleeding  * Increase in swelling  * Need for compression bandage changes due to slippage, breakthrough drainage.     If you need medical attention outside of the business hours of the Wound Care Centers please contact your PCP or go to the nearest emergency room.         Electronically signed by Sydnee Baca MD on 1/31/2024 at 3:13 PM

## 2024-02-05 NOTE — DISCHARGE INSTRUCTIONS
Written         Visit Discharge/Physician Orders     Discharge condition: Stable     Assessment of pain at discharge: yes     Anesthetic used: 4% lidocaine      Discharge to: Home     Left via:Private automobile     Accompanied by: family     ECF/HHA: [facility]      Dressing Orders: Left lateral ankle: Cleanse wound with normal saline. COVER WITH DRAWTEX. APPLY COBAN 2 WRAP TO  LEG. Change M-W (in [facility])-F.      IF WRAP FALLS 2 INCHES, OR IF PAIN INCREASES AND BECOMES INTOLERABLE,  REMOVE WRAP AND APPLY DOUBLE TUBIGRIP.  CALL WOUND CARE CENTER.       Treatment Orders: Eat a diet high in protein and vitamin C. Take a multiple vitamin daily unless contraindicated.      Elevate leg as much as possible.     Follow up with Dr. Brooks for foot care     [facility] followup visit : 1 week ________________________  (Please note your next appointment above and if you are unable to keep, kindly give a 24 hour notice. Thank you.)     Physician signature:__________________________      If you experience any of the following, please call the Wound Care Center during business hours:     * Increase in Pain  * Temperature over 101  * Increase in drainage from your wound  * Drainage with a foul odor  * Bleeding  * Increase in swelling  * Need for compression bandage changes due to slippage, breakthrough drainage.     If you need medical attention outside of the business hours of the Wound Care Centers please contact your PCP or go to the nearest emergency room.

## 2024-02-07 ENCOUNTER — HOSPITAL ENCOUNTER (OUTPATIENT)
Dept: WOUND CARE | Age: 69
Discharge: HOME | End: 2024-02-07
Payer: COMMERCIAL

## 2024-02-07 VITALS
HEART RATE: 84 BPM | TEMPERATURE: 97 F | RESPIRATION RATE: 20 BRPM | HEIGHT: 74 IN | BODY MASS INDEX: 31.44 KG/M2 | DIASTOLIC BLOOD PRESSURE: 86 MMHG | WEIGHT: 245 LBS | SYSTOLIC BLOOD PRESSURE: 182 MMHG

## 2024-02-07 DIAGNOSIS — L97.224 DIABETIC ULCER OF LEFT CALF ASSOCIATED WITH TYPE 2 DIABETES MELLITUS, WITH NECROSIS OF BONE (HCC): ICD-10-CM

## 2024-02-07 DIAGNOSIS — E11.622 DIABETIC ULCER OF LEFT CALF ASSOCIATED WITH TYPE 2 DIABETES MELLITUS, WITH NECROSIS OF BONE (HCC): ICD-10-CM

## 2024-02-07 DIAGNOSIS — M86.462 CHRONIC OSTEOMYELITIS OF LEFT FIBULA WITH DRAINING SINUS (HCC): Primary | ICD-10-CM

## 2024-02-07 DIAGNOSIS — I70.243 ATHEROSCLEROSIS OF NATIVE ARTERY OF LEFT LEG WITH ULCERATION OF ANKLE (HCC): ICD-10-CM

## 2024-02-07 PROCEDURE — 11043 DBRDMT MUSC&/FSCA 1ST 20/<: CPT | Performed by: SURGERY

## 2024-02-07 PROCEDURE — 11043 DBRDMT MUSC&/FSCA 1ST 20/<: CPT

## 2024-02-07 RX ORDER — IBUPROFEN 200 MG
TABLET ORAL ONCE
OUTPATIENT
Start: 2024-02-07 | End: 2024-02-07

## 2024-02-07 RX ORDER — BETAMETHASONE DIPROPIONATE 0.5 MG/G
CREAM TOPICAL ONCE
OUTPATIENT
Start: 2024-02-07 | End: 2024-02-07

## 2024-02-07 RX ORDER — TRIAMCINOLONE ACETONIDE 1 MG/G
OINTMENT TOPICAL ONCE
OUTPATIENT
Start: 2024-02-07 | End: 2024-02-07

## 2024-02-07 RX ORDER — LIDOCAINE HYDROCHLORIDE 40 MG/ML
SOLUTION TOPICAL ONCE
Status: COMPLETED | OUTPATIENT
Start: 2024-02-07 | End: 2024-02-07

## 2024-02-07 RX ORDER — CLOBETASOL PROPIONATE 0.5 MG/G
OINTMENT TOPICAL ONCE
OUTPATIENT
Start: 2024-02-07 | End: 2024-02-07

## 2024-02-07 RX ORDER — LIDOCAINE 40 MG/G
CREAM TOPICAL ONCE
OUTPATIENT
Start: 2024-02-07 | End: 2024-02-07

## 2024-02-07 RX ORDER — GENTAMICIN SULFATE 1 MG/G
OINTMENT TOPICAL ONCE
OUTPATIENT
Start: 2024-02-07 | End: 2024-02-07

## 2024-02-07 RX ORDER — LIDOCAINE HYDROCHLORIDE 20 MG/ML
JELLY TOPICAL ONCE
OUTPATIENT
Start: 2024-02-07 | End: 2024-02-07

## 2024-02-07 RX ORDER — LIDOCAINE 50 MG/G
OINTMENT TOPICAL ONCE
OUTPATIENT
Start: 2024-02-07 | End: 2024-02-07

## 2024-02-07 RX ORDER — LIDOCAINE HYDROCHLORIDE 40 MG/ML
SOLUTION TOPICAL ONCE
OUTPATIENT
Start: 2024-02-07 | End: 2024-02-07

## 2024-02-07 RX ORDER — SODIUM CHLOR/HYPOCHLOROUS ACID 0.033 %
SOLUTION, IRRIGATION IRRIGATION ONCE
OUTPATIENT
Start: 2024-02-07 | End: 2024-02-07

## 2024-02-07 RX ORDER — BACITRACIN ZINC AND POLYMYXIN B SULFATE 500; 1000 [USP'U]/G; [USP'U]/G
OINTMENT TOPICAL ONCE
OUTPATIENT
Start: 2024-02-07 | End: 2024-02-07

## 2024-02-07 RX ORDER — BACITRACIN ZINC 500 [USP'U]/G
OINTMENT TOPICAL ONCE
OUTPATIENT
Start: 2024-02-07 | End: 2024-02-07

## 2024-02-07 RX ADMIN — LIDOCAINE HYDROCHLORIDE 10 ML: 40 SOLUTION TOPICAL at 15:42

## 2024-02-07 ASSESSMENT — PAIN SCALES - GENERAL: PAINLEVEL_OUTOF10: 0

## 2024-02-07 NOTE — PROGRESS NOTES
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    RAGHU (obstructive sleep apnea)     Osteomyelitis ([facility]) 10/24/2018    Osteomyelitis of right lower limb (HCC) 10/24/2018    Pain syndrome, chronic     Postoperative wound infection     PVD (peripheral vascular disease) ([facility])     Tobacco dependence     Vascular occlusion     Vitamin D deficiency     Wound infection 04/16/2020     Past Surgical History:   Procedure Laterality Date    AMPUTATION ABOVE KNEE Right 4/28/2020    DEBRIDEMENT OF AMPUTATION RIGHT ABOVE KNEE performed by Sydnee Baca MD at [facility] [place]    AMPUTATION ABOVE KNEE Right 4/30/2020    DEBRIDEMENT OF AMPUTATION RIGHT ABOVE KNEE,  POSS. ABOVE KNEE REVISION, POSS. CLOSURE, POSS.WOUND VAC -- DRS. CASH / CAROL TO LOOK IN performed by Sydnee Baca MD at [facility] [place]    FEMORAL BYPASS      Right - [place], [place]    FEMORAL ENDARTERECTOMY Right 3/20/2020    RIGHT LOWER EXTREMITY THROMBECTOMY, POSSIBLE ANGIOGRAM, POSSIBLE INTERVENTION, POSSIBLE BYPASS. performed by Sydnee Baca MD at [facility] [place]    LEG SURGERY Right 4/17/2020    RIGHT LEG DEBRIDEMENT INCISION AND DRAINAGE performed by Ken Gaviria MD at [facility] [place]    LEG SURGERY Right 4/20/2020    RIGHT THIGH WOUND DRESSING CHANGE; DEBRIDEMENT, removal of muscle performed by Ken Gaviria MD at [facility] [place]    LEG SURGERY Right 4/21/2020    RIGHT THIGH WOUND DRESSING CHANGE POSSIBLE  DEBRIDEMENT - NEEDS DRS. CASH / JANETTE TO SEE performed by Wes Pretty MD at [facility] [place]    LEG SURGERY Right 4/23/2020    RIGHT THIGH WOUND DRESSING CHANGE and DEBRIDEMENT performed by Sydnee Baca MD at [facility] [place]    LEG SURGERY Right 10/15/2020    DEBRIDEMENT RIGHT ABOVE KNEE AMPUTATION POSS. REVISION POSS. WOUND VAC performed by Sydnee Baca MD at [facility] [place]    LEG SURGERY Right 12/17/2020    DEBRIDEMENT  RIGHT ABOVE KNEE AMPUTATION WITH

## 2024-02-07 NOTE — PLAN OF CARE
Problem: Chronic Conditions and Co-morbidities  Goal: Patient's chronic conditions and co-morbidity symptoms are monitored and maintained or improved  Outcome: Progressing     Problem: Pain  Goal: Verbalizes/displays adequate comfort level or baseline comfort level  Outcome: Progressing     Problem: Cognitive:  Goal: Knowledge of wound care  Description: Knowledge of wound care  Outcome: Progressing  Goal: Understands risk factors for wounds  Description: Understands risk factors for wounds  Outcome: Progressing     Problem: Wound:  Goal: Will show signs of wound healing; wound closure and no evidence of infection  Description: Will show signs of wound healing; wound closure and no evidence of infection  Outcome: Progressing     Problem: Blood Glucose:  Goal: Ability to maintain appropriate glucose levels will improve  Description: Ability to maintain appropriate glucose levels will improve  Outcome: Adequate for Discharge

## 2024-02-08 DIAGNOSIS — Z79.4 CONTROLLED TYPE 2 DIABETES MELLITUS WITH OTHER SPECIFIED COMPLICATION, WITH LONG-TERM CURRENT USE OF INSULIN (HCC): ICD-10-CM

## 2024-02-08 DIAGNOSIS — T87.9 AKA STUMP COMPLICATION (HCC): ICD-10-CM

## 2024-02-08 DIAGNOSIS — E11.69 CONTROLLED TYPE 2 DIABETES MELLITUS WITH OTHER SPECIFIED COMPLICATION, WITH LONG-TERM CURRENT USE OF INSULIN (HCC): ICD-10-CM

## 2024-02-08 DIAGNOSIS — I73.9 PVD (PERIPHERAL VASCULAR DISEASE) (HCC): ICD-10-CM

## 2024-02-08 DIAGNOSIS — G89.18 POSTOPERATIVE PAIN: ICD-10-CM

## 2024-02-08 DIAGNOSIS — G89.4 CHRONIC PAIN SYNDROME: ICD-10-CM

## 2024-02-08 RX ORDER — OXYCODONE HCL 10 MG/1
10 TABLET, FILM COATED, EXTENDED RELEASE ORAL 2 TIMES DAILY
Qty: 60 TABLET | Refills: 0 | Status: SHIPPED | OUTPATIENT
Start: 2024-02-08 | End: 2024-03-09

## 2024-02-08 RX ORDER — OXYCODONE AND ACETAMINOPHEN 7.5; 325 MG/1; MG/1
1 TABLET ORAL EVERY 4 HOURS PRN
Qty: 120 TABLET | Refills: 0 | Status: SHIPPED | OUTPATIENT
Start: 2024-02-08 | End: 2024-03-09

## 2024-02-13 ENCOUNTER — OFFICE VISIT (OUTPATIENT)
Dept: PRIMARY CARE CLINIC | Age: 69
End: 2024-02-13
Payer: COMMERCIAL

## 2024-02-13 VITALS
OXYGEN SATURATION: 98 % | SYSTOLIC BLOOD PRESSURE: 122 MMHG | HEART RATE: 85 BPM | TEMPERATURE: 98 F | BODY MASS INDEX: 34.39 KG/M2 | DIASTOLIC BLOOD PRESSURE: 66 MMHG | WEIGHT: 268 LBS | HEIGHT: 74 IN

## 2024-02-13 DIAGNOSIS — N25.81 SECONDARY HYPERPARATHYROIDISM OF RENAL ORIGIN (HCC): ICD-10-CM

## 2024-02-13 DIAGNOSIS — L08.9 DIABETIC FOOT INFECTION (HCC): ICD-10-CM

## 2024-02-13 DIAGNOSIS — I10 HYPERTENSION, UNSPECIFIED TYPE: ICD-10-CM

## 2024-02-13 DIAGNOSIS — M86.462 CHRONIC OSTEOMYELITIS OF LEFT FIBULA WITH DRAINING SINUS (HCC): ICD-10-CM

## 2024-02-13 DIAGNOSIS — E11.622 DIABETIC ULCER OF LEFT CALF ASSOCIATED WITH TYPE 2 DIABETES MELLITUS, WITH NECROSIS OF BONE (HCC): Chronic | ICD-10-CM

## 2024-02-13 DIAGNOSIS — E11.628 DIABETIC FOOT INFECTION (HCC): ICD-10-CM

## 2024-02-13 DIAGNOSIS — I70.243 ATHEROSCLEROSIS OF NATIVE ARTERY OF LEFT LEG WITH ULCERATION OF ANKLE (HCC): Chronic | ICD-10-CM

## 2024-02-13 DIAGNOSIS — Z79.4 CONTROLLED TYPE 2 DIABETES MELLITUS WITH OTHER SPECIFIED COMPLICATION, WITH LONG-TERM CURRENT USE OF INSULIN (HCC): ICD-10-CM

## 2024-02-13 DIAGNOSIS — G89.4 CHRONIC PAIN SYNDROME: ICD-10-CM

## 2024-02-13 DIAGNOSIS — S81.802S OPEN WOUND OF LEFT LOWER LEG, SEQUELA: ICD-10-CM

## 2024-02-13 DIAGNOSIS — L97.224 DIABETIC ULCER OF LEFT CALF ASSOCIATED WITH TYPE 2 DIABETES MELLITUS, WITH NECROSIS OF BONE (HCC): Chronic | ICD-10-CM

## 2024-02-13 DIAGNOSIS — N18.30 STAGE 3 CHRONIC KIDNEY DISEASE, UNSPECIFIED WHETHER STAGE 3A OR 3B CKD (HCC): ICD-10-CM

## 2024-02-13 DIAGNOSIS — I73.9 PVD (PERIPHERAL VASCULAR DISEASE) (HCC): ICD-10-CM

## 2024-02-13 DIAGNOSIS — Z00.00 MEDICARE ANNUAL WELLNESS VISIT, SUBSEQUENT: Primary | ICD-10-CM

## 2024-02-13 DIAGNOSIS — S78.111A ABOVE KNEE AMPUTATION OF RIGHT LOWER EXTREMITY (HCC): ICD-10-CM

## 2024-02-13 DIAGNOSIS — Z23 NEED FOR INFLUENZA VACCINATION: ICD-10-CM

## 2024-02-13 DIAGNOSIS — E11.69 CONTROLLED TYPE 2 DIABETES MELLITUS WITH OTHER SPECIFIED COMPLICATION, WITH LONG-TERM CURRENT USE OF INSULIN (HCC): ICD-10-CM

## 2024-02-13 PROCEDURE — 3078F DIAST BP <80 MM HG: CPT | Performed by: FAMILY MEDICINE

## 2024-02-13 PROCEDURE — G0439 PPPS, SUBSEQ VISIT: HCPCS | Performed by: FAMILY MEDICINE

## 2024-02-13 PROCEDURE — 3051F HG A1C>EQUAL 7.0%<8.0%: CPT | Performed by: FAMILY MEDICINE

## 2024-02-13 PROCEDURE — 3074F SYST BP LT 130 MM HG: CPT | Performed by: FAMILY MEDICINE

## 2024-02-13 PROCEDURE — 90694 VACC AIIV4 NO PRSRV 0.5ML IM: CPT | Performed by: FAMILY MEDICINE

## 2024-02-13 PROCEDURE — G0008 ADMIN INFLUENZA VIRUS VAC: HCPCS | Performed by: FAMILY MEDICINE

## 2024-02-13 PROCEDURE — G8484 FLU IMMUNIZE NO ADMIN: HCPCS | Performed by: FAMILY MEDICINE

## 2024-02-13 PROCEDURE — 1124F ACP DISCUSS-NO DSCNMKR DOCD: CPT | Performed by: FAMILY MEDICINE

## 2024-02-13 PROCEDURE — 3017F COLORECTAL CA SCREEN DOC REV: CPT | Performed by: FAMILY MEDICINE

## 2024-02-13 RX ORDER — HYDRALAZINE HYDROCHLORIDE 100 MG/1
100 TABLET, FILM COATED ORAL 3 TIMES DAILY
COMMUNITY
Start: 2024-01-15

## 2024-02-13 NOTE — PROGRESS NOTES
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  Continues to follow with wound care/vascular surgery for the treatment of his left lower extremity wound.  Does need a 3M Coban to 2 layer compression system to help with healing as he does have delayed healing secondary to severe peripheral vascular disease and type 2 diabetes.  Will keep him on his current medication regimen at this time and see him back in 3 months.    Recommendations for Preventive Services Due: see orders and patient instructions/AVS.  Recommended screening schedule for the next 5-10 years is provided to the patient in written form: see Patient Instructions/AVS.     Return in about 3 months (around 5/13/2024).     Subjective   The following acute and/or chronic problems were also addressed today:  Patient presents today for annual wellness visit.  Doing better since hospital discharge.  Continues to follow with wound care, vascular surgery, podiatry, and infectious disease.  Has been doing well with current regimen however is healing

## 2024-02-13 NOTE — PATIENT INSTRUCTIONS
your doctor if you think you are having a problem with your medicine.     If your doctor recommends aspirin, take the amount directed each day. Make sure you take aspirin and not another kind of pain reliever, such as acetaminophen (Tylenol).   When should you call for help?   Call 911 if you have symptoms of a heart attack. These may include:    Chest pain or pressure, or a strange feeling in the chest.     Sweating.     Shortness of breath.     Pain, pressure, or a strange feeling in the back, neck, jaw, or upper belly or in one or both shoulders or arms.     Lightheadedness or sudden weakness.     A fast or irregular heartbeat.   After you call 911, the  may tell you to chew 1 adult-strength or 2 to 4 low-dose aspirin. Wait for an ambulance. Do not try to drive yourself.  Watch closely for changes in your health, and be sure to contact your doctor if you have any problems.  Where can you learn more?  Go to https://www.[employer].net/patientEd and enter F075 to learn more about \"A Healthy Heart: Care Instructions.\"  Current as of: June 25, 2023               Content Version: 13.9  © 9784-7990 [employer].   Care instructions adapted under license by [employer]. If you have questions about a medical condition or this instruction, always ask your healthcare professional. [employer] disclaims any warranty or liability for your use of this information.      Personalized Preventive Plan for Florentin Stark - 2/13/2024  Medicare offers a range of preventive health benefits. Some of the tests and screenings are paid in full while other may be subject to a deductible, co-insurance, and/or copay.    Some of these benefits include a comprehensive review of your medical history including lifestyle, illnesses that may run in your family, and various assessments and screenings as appropriate.    After reviewing your medical record and screening and assessments performed today your provider

## 2024-02-14 DIAGNOSIS — I73.9 PVD (PERIPHERAL VASCULAR DISEASE) (HCC): ICD-10-CM

## 2024-02-15 RX ORDER — CILOSTAZOL 50 MG/1
TABLET ORAL
Qty: 60 TABLET | Refills: 10 | Status: SHIPPED | OUTPATIENT
Start: 2024-02-15

## 2024-02-20 NOTE — DISCHARGE INSTRUCTIONS
Visit Discharge/Physician Orders     Discharge condition: Stable     Assessment of pain at discharge: yes     Anesthetic used: 4% lidocaine      Discharge to: Home     Left via:Private automobile     Accompanied by: family     ECF/HHA: [facility]      Dressing Orders: Left lateral ankle: Cleanse wound with normal saline. COVER WITH DRAWTEX. APPLY COBAN 2 WRAP TO  LEG. Change M-W (in [facility])-F.       IF WRAP FALLS 2 INCHES, OR IF PAIN INCREASES AND BECOMES INTOLERABLE,  REMOVE WRAP AND APPLY DOUBLE TUBIGRIP.  CALL WOUND CARE CENTER.       Treatment Orders: Eat a diet high in protein and vitamin C. Take a multiple vitamin daily unless contraindicated.      Elevate leg as much as possible.     Follow up with Dr. Brooks for foot care     [facility] followup visit : 1 week ________________________  (Please note your next appointment above and if you are unable to keep, kindly give a 24 hour notice. Thank you.)     Physician signature:__________________________      If you experience any of the following, please call the Wound Care Center during business hours:     * Increase in Pain  * Temperature over 101  * Increase in drainage from your wound  * Drainage with a foul odor  * Bleeding  * Increase in swelling  * Need for compression bandage changes due to slippage, breakthrough drainage.     If you need medical attention outside of the business hours of the Wound Care Centers please contact your PCP or go to the nearest emergency room.

## 2024-02-21 ENCOUNTER — HOSPITAL ENCOUNTER (OUTPATIENT)
Dept: WOUND CARE | Age: 69
Discharge: HOME | End: 2024-02-21

## 2024-02-27 NOTE — DISCHARGE INSTRUCTIONS
Visit Discharge/Physician Orders     Discharge condition: Stable     Assessment of pain at discharge: yes     Anesthetic used: 4% lidocaine      Discharge to: Home     Left via:Private automobile     Accompanied by: family     ECF/HHA: [facility]      Dressing Orders: Left lateral ankle: Cleanse wound with normal saline. COVER WITH DRAWTEX. APPLY COBAN 2 WRAP TO  LEG. Change M-W (in [facility])-F.       IF WRAP FALLS 2 INCHES, OR IF PAIN INCREASES AND BECOMES INTOLERABLE,  REMOVE WRAP AND APPLY DOUBLE TUBIGRIP.  CALL WOUND CARE CENTER.       Treatment Orders: Eat a diet high in protein and vitamin C. Take a multiple vitamin daily unless contraindicated.      Elevate leg as much as possible.     Follow up with Dr. Brooks for foot care    *discussed HBO*     [facility] followup visit : 1 week ________________________  (Please note your next appointment above and if you are unable to keep, kindly give a 24 hour notice. Thank you.)     Physician signature:__________________________      If you experience any of the following, please call the Wound Care Center during business hours:     * Increase in Pain  * Temperature over 101  * Increase in drainage from your wound  * Drainage with a foul odor  * Bleeding  * Increase in swelling  * Need for compression bandage changes due to slippage, breakthrough drainage.     If you need medical attention outside of the business hours of the Wound Care Centers please contact your PCP or go to the nearest emergency room.

## 2024-02-28 ENCOUNTER — HOSPITAL ENCOUNTER (OUTPATIENT)
Dept: WOUND CARE | Age: 69
Discharge: HOME | End: 2024-02-28
Payer: COMMERCIAL

## 2024-02-28 ENCOUNTER — HOSPITAL ENCOUNTER (OUTPATIENT)
Dept: HYPERBARIC MEDICINE | Age: 69
Discharge: HOME | End: 2024-02-28
Payer: COMMERCIAL

## 2024-02-28 VITALS
RESPIRATION RATE: 20 BRPM | HEART RATE: 80 BPM | SYSTOLIC BLOOD PRESSURE: 142 MMHG | TEMPERATURE: 98.5 F | DIASTOLIC BLOOD PRESSURE: 70 MMHG

## 2024-02-28 DIAGNOSIS — L97.224 DIABETIC ULCER OF LEFT CALF ASSOCIATED WITH TYPE 2 DIABETES MELLITUS, WITH NECROSIS OF BONE (HCC): ICD-10-CM

## 2024-02-28 DIAGNOSIS — I70.243 ATHEROSCLEROSIS OF NATIVE ARTERY OF LEFT LEG WITH ULCERATION OF ANKLE (HCC): ICD-10-CM

## 2024-02-28 DIAGNOSIS — E11.622 DIABETIC ULCER OF LEFT CALF ASSOCIATED WITH TYPE 2 DIABETES MELLITUS, WITH NECROSIS OF BONE (HCC): ICD-10-CM

## 2024-02-28 DIAGNOSIS — M86.462 CHRONIC OSTEOMYELITIS OF LEFT FIBULA WITH DRAINING SINUS (HCC): Primary | ICD-10-CM

## 2024-02-28 PROCEDURE — 11045 DBRDMT SUBQ TISS EACH ADDL: CPT

## 2024-02-28 PROCEDURE — 99213 OFFICE O/P EST LOW 20 MIN: CPT

## 2024-02-28 PROCEDURE — 11042 DBRDMT SUBQ TIS 1ST 20SQCM/<: CPT

## 2024-02-28 RX ORDER — GENTAMICIN SULFATE 1 MG/G
OINTMENT TOPICAL ONCE
OUTPATIENT
Start: 2024-02-28 | End: 2024-02-28

## 2024-02-28 RX ORDER — LIDOCAINE HYDROCHLORIDE 20 MG/ML
JELLY TOPICAL ONCE
OUTPATIENT
Start: 2024-02-28 | End: 2024-02-28

## 2024-02-28 RX ORDER — IBUPROFEN 200 MG
TABLET ORAL ONCE
OUTPATIENT
Start: 2024-02-28 | End: 2024-02-28

## 2024-02-28 RX ORDER — LIDOCAINE 40 MG/G
CREAM TOPICAL ONCE
OUTPATIENT
Start: 2024-02-28 | End: 2024-02-28

## 2024-02-28 RX ORDER — BETAMETHASONE DIPROPIONATE 0.5 MG/G
CREAM TOPICAL ONCE
OUTPATIENT
Start: 2024-02-28 | End: 2024-02-28

## 2024-02-28 RX ORDER — LIDOCAINE HYDROCHLORIDE 40 MG/ML
SOLUTION TOPICAL ONCE
Status: COMPLETED | OUTPATIENT
Start: 2024-02-28 | End: 2024-02-28

## 2024-02-28 RX ORDER — CLOBETASOL PROPIONATE 0.5 MG/G
OINTMENT TOPICAL ONCE
OUTPATIENT
Start: 2024-02-28 | End: 2024-02-28

## 2024-02-28 RX ORDER — LIDOCAINE 50 MG/G
OINTMENT TOPICAL ONCE
OUTPATIENT
Start: 2024-02-28 | End: 2024-02-28

## 2024-02-28 RX ORDER — TRIAMCINOLONE ACETONIDE 1 MG/G
OINTMENT TOPICAL ONCE
OUTPATIENT
Start: 2024-02-28 | End: 2024-02-28

## 2024-02-28 RX ORDER — LIDOCAINE HYDROCHLORIDE 40 MG/ML
SOLUTION TOPICAL ONCE
OUTPATIENT
Start: 2024-02-28 | End: 2024-02-28

## 2024-02-28 RX ORDER — BACITRACIN ZINC 500 [USP'U]/G
OINTMENT TOPICAL ONCE
OUTPATIENT
Start: 2024-02-28 | End: 2024-02-28

## 2024-02-28 RX ORDER — BACITRACIN ZINC AND POLYMYXIN B SULFATE 500; 1000 [USP'U]/G; [USP'U]/G
OINTMENT TOPICAL ONCE
OUTPATIENT
Start: 2024-02-28 | End: 2024-02-28

## 2024-02-28 RX ORDER — SODIUM CHLOR/HYPOCHLOROUS ACID 0.033 %
SOLUTION, IRRIGATION IRRIGATION ONCE
OUTPATIENT
Start: 2024-02-28 | End: 2024-02-28

## 2024-02-28 RX ADMIN — LIDOCAINE HYDROCHLORIDE 10 ML: 40 SOLUTION TOPICAL at 13:26

## 2024-02-28 NOTE — PLAN OF CARE
Problem: Cognitive:  Goal: Knowledge of wound care  Description: Knowledge of wound care  Outcome: Progressing  Goal: Understands risk factors for wounds  Description: Understands risk factors for wounds  Outcome: Progressing     Problem: Wound:  Goal: Will show signs of wound healing; wound closure and no evidence of infection  Description: Will show signs of wound healing; wound closure and no evidence of infection  Outcome: Progressing     Problem: Blood Glucose:  Goal: Ability to maintain appropriate glucose levels will improve  Description: Ability to maintain appropriate glucose levels will improve  Outcome: Progressing

## 2024-02-28 NOTE — PROGRESS NOTES
Hyperbaric Medicine Department  Transcutaneous Oxygen Study (TCOM)    TODAY'S DATE:  2024      NAME: Florentin Stark  MEDICAL RECORD NUMBER:  [id number]  AGE: 68 y.o.   GENDER: male  : 1955         Procedure:  Following calibration of the [id number],  Transcutaneous Oxygen studies were performed on the on right lower extremity at multiple levels. Room air testing was performed for 15 minutes. An oxygen challenge study was then performed with the patient breathing 100% oxygen at 15 lpm via non-rebreather mask for a period of 10 minutes. The patient tolerated procedure well.    TCOM Site Photo:       Document Information    Wound Care Image: Wound   TcpO2 lead placement   2024 15:00   Attached To:   Hospital Encounter on 24 with [facility]    Source Information    Rudi Martinez LPN [facility]           TCOM Test:         Document Information    Wound Care Image: Wound Care   tcpO2 results lle   2024 13:00   Attached To:   Hospital Encounter on 24 with [facility]    Source Information    Rudi Martinez LPN [facility]       Tissue pO2 measurement (in mmHg )   COMPONENT TIME SITE  1 SITE  2 SITE  3   x Baseline values: Patient breathing AIR @ 1 ALEXUS @ 15 min 31 26.3 15.4   x Oxygen challenge: Patient breathing 100% O2 @ 1 ALEXUS @ 10 min 74.1 71.9 39.9    Hyperbaric oxygen challenge  Patient breathing 100%O2 @ _______ ALEXUS @ 30 min        @ 60 min        @ 90 min          *Electronically signed by Rudi Martinez LPN on 24 at 4:10 PM EST  
assist you during your time with us.         GENERAL INFORMATION REVIEWED: Yes      You will need to arrive 30 minutes prior to your visit.     Please call us if you are not feeling well the day of your visit, so that we can determine if it will be necessary to reschedule your treatment (before you arrive).      Each visit will begin with a staff member asking you a series of questions.  These questions may sound repetitive, but please understand, for safety reasons, that we must ask the same questions each and every visit.      Only chamber approved clothing may be worn during treatment, therefore special clothing is provided for you at each visit.      For safety reasons, chamber operators are required to ask about prohibited items each and every visit.      Vital signs (blood pressure, temperature pulse, and respirations) will be taken both before and after each visit.      PRESSURE CHANGES INFORMATION REVIEWED: Yes   As the pressure within the chamber changes, you may notice changes to your ears, sinuses or lungs.  For example, your ears may \"pop\" as if you are traveling on an airplane or scuba diving.  Please notify the chamber  if you are experiencing pain in your ears, sinuses or lungs during your treatment \"dive.\"      To help prevent pain or injury to your ears, you will be taught by the staff and will practice thoroughly a procedure, known as the Valsalva maneuver, as well as other techniques prior to your first treatment \"dive.\"  Although the pressure in the chamber is not felt on the body, you do feel changes in the ears.  The eardrum is normally flat while you are at ground level.  Pressure changes in the atmosphere around you will usually be felt in the ears.  The eardrum tends to bow inwardly and unless you take positive action, fullness or pain will be felt.  In order to avoid this, you will be taught how to force air into the middle ear during the few minutes that it takes to pressurize the

## 2024-03-04 ENCOUNTER — TELEPHONE (OUTPATIENT)
Dept: HYPERBARIC MEDICINE | Age: 69
End: 2024-03-04

## 2024-03-06 ENCOUNTER — HOSPITAL ENCOUNTER (OUTPATIENT)
Dept: GENERAL RADIOLOGY | Age: 69
Discharge: HOME | End: 2024-03-08
Payer: COMMERCIAL

## 2024-03-06 ENCOUNTER — HOSPITAL ENCOUNTER (OUTPATIENT)
Dept: WOUND CARE | Age: 69
Discharge: HOME | End: 2024-03-06
Attending: SURGERY
Payer: COMMERCIAL

## 2024-03-06 ENCOUNTER — HOSPITAL ENCOUNTER (OUTPATIENT)
Age: 69
Discharge: HOME | End: 2024-03-08
Payer: COMMERCIAL

## 2024-03-06 VITALS
HEART RATE: 87 BPM | TEMPERATURE: 99 F | RESPIRATION RATE: 20 BRPM | DIASTOLIC BLOOD PRESSURE: 76 MMHG | SYSTOLIC BLOOD PRESSURE: 169 MMHG

## 2024-03-06 DIAGNOSIS — L97.224 DIABETIC ULCER OF LEFT CALF ASSOCIATED WITH TYPE 2 DIABETES MELLITUS, WITH NECROSIS OF BONE (HCC): ICD-10-CM

## 2024-03-06 DIAGNOSIS — I70.243 ATHEROSCLEROSIS OF NATIVE ARTERY OF LEFT LEG WITH ULCERATION OF ANKLE (HCC): ICD-10-CM

## 2024-03-06 DIAGNOSIS — E11.622 DIABETIC ULCER OF LEFT CALF ASSOCIATED WITH TYPE 2 DIABETES MELLITUS, WITH NECROSIS OF BONE (HCC): ICD-10-CM

## 2024-03-06 DIAGNOSIS — M86.462 CHRONIC OSTEOMYELITIS OF LEFT FIBULA WITH DRAINING SINUS (HCC): Primary | ICD-10-CM

## 2024-03-06 PROCEDURE — 11043 DBRDMT MUSC&/FSCA 1ST 20/<: CPT | Performed by: SURGERY

## 2024-03-06 PROCEDURE — 71046 X-RAY EXAM CHEST 2 VIEWS: CPT

## 2024-03-06 PROCEDURE — 11046 DBRDMT MUSC&/FSCA EA ADDL: CPT | Performed by: SURGERY

## 2024-03-06 PROCEDURE — 11046 DBRDMT MUSC&/FSCA EA ADDL: CPT

## 2024-03-06 PROCEDURE — 11043 DBRDMT MUSC&/FSCA 1ST 20/<: CPT

## 2024-03-06 RX ORDER — BETAMETHASONE DIPROPIONATE 0.5 MG/G
CREAM TOPICAL ONCE
OUTPATIENT
Start: 2024-03-06 | End: 2024-03-06

## 2024-03-06 RX ORDER — TRIAMCINOLONE ACETONIDE 1 MG/G
OINTMENT TOPICAL ONCE
OUTPATIENT
Start: 2024-03-06 | End: 2024-03-06

## 2024-03-06 RX ORDER — LIDOCAINE 40 MG/G
CREAM TOPICAL ONCE
OUTPATIENT
Start: 2024-03-06 | End: 2024-03-06

## 2024-03-06 RX ORDER — GENTAMICIN SULFATE 1 MG/G
OINTMENT TOPICAL ONCE
OUTPATIENT
Start: 2024-03-06 | End: 2024-03-06

## 2024-03-06 RX ORDER — LIDOCAINE HYDROCHLORIDE 20 MG/ML
JELLY TOPICAL ONCE
OUTPATIENT
Start: 2024-03-06 | End: 2024-03-06

## 2024-03-06 RX ORDER — IBUPROFEN 200 MG
TABLET ORAL ONCE
OUTPATIENT
Start: 2024-03-06 | End: 2024-03-06

## 2024-03-06 RX ORDER — LIDOCAINE HYDROCHLORIDE 40 MG/ML
SOLUTION TOPICAL ONCE
Status: COMPLETED | OUTPATIENT
Start: 2024-03-06 | End: 2024-03-06

## 2024-03-06 RX ORDER — CLOBETASOL PROPIONATE 0.5 MG/G
OINTMENT TOPICAL ONCE
OUTPATIENT
Start: 2024-03-06 | End: 2024-03-06

## 2024-03-06 RX ORDER — BACITRACIN ZINC 500 [USP'U]/G
OINTMENT TOPICAL ONCE
OUTPATIENT
Start: 2024-03-06 | End: 2024-03-06

## 2024-03-06 RX ORDER — BACITRACIN ZINC AND POLYMYXIN B SULFATE 500; 1000 [USP'U]/G; [USP'U]/G
OINTMENT TOPICAL ONCE
OUTPATIENT
Start: 2024-03-06 | End: 2024-03-06

## 2024-03-06 RX ORDER — LIDOCAINE HYDROCHLORIDE 40 MG/ML
SOLUTION TOPICAL ONCE
OUTPATIENT
Start: 2024-03-06 | End: 2024-03-06

## 2024-03-06 RX ORDER — LIDOCAINE 50 MG/G
OINTMENT TOPICAL ONCE
OUTPATIENT
Start: 2024-03-06 | End: 2024-03-06

## 2024-03-06 RX ORDER — NICOTINE 21 MG/24HR
1 PATCH, TRANSDERMAL 24 HOURS TRANSDERMAL DAILY
Status: DISCONTINUED | OUTPATIENT
Start: 2024-03-06 | End: 2024-03-07 | Stop reason: HOSPADM

## 2024-03-06 RX ORDER — SODIUM CHLOR/HYPOCHLOROUS ACID 0.033 %
SOLUTION, IRRIGATION IRRIGATION ONCE
OUTPATIENT
Start: 2024-03-06 | End: 2024-03-06

## 2024-03-06 RX ADMIN — LIDOCAINE HYDROCHLORIDE 10 ML: 40 SOLUTION TOPICAL at 10:58

## 2024-03-06 ASSESSMENT — PAIN SCALES - GENERAL: PAINLEVEL_OUTOF10: 10

## 2024-03-06 ASSESSMENT — PAIN DESCRIPTION - ORIENTATION: ORIENTATION: LEFT

## 2024-03-06 ASSESSMENT — PAIN DESCRIPTION - LOCATION: LOCATION: LEG

## 2024-03-06 ASSESSMENT — PAIN DESCRIPTION - DESCRIPTORS: DESCRIPTORS: ACHING;BURNING;SHARP;SHOOTING

## 2024-03-06 NOTE — PROGRESS NOTES
Wound Healing Center Followup Visit Note    Referring Physician : Juanito Kline DO  Florentin Stark  MEDICAL RECORD NUMBER:  [id number]  AGE: 68 y.o.   GENDER: male  : 1955  EPISODE DATE:  3/6/2024    Subjective:     Chief Complaint   Patient presents with    Wound Check     Left leg      HISTORY of PRESENT ILLNESS HPI   Florentin Stark is a 68 y.o. male who presents today in regards to follow up evaluation and treatment of wound/ulcer.  That patient's past medical, family and social hx were reviewed and changes were made if present.    History of Wound Context:  Patient has had wound of his left lateral calf since .  He was following with Dr Brooks in regards to this as outpt.  He was admitted 2023 with worsening wounds.      He underwent angiogram with L SFA, pop plasty and L Tp trunk, Proximal PT plasty on 23.  While admitted he was seen by dr NORRIS Hurt from podiatry who had done bedside debridement.  He was discharge on 6 weeks of zosyn per ID.  He fu with Dr Camara from ID .  She started him on augmentin for 2 week after completion of zosyn 11/10.      He has not fu with Dr Brooks or Dr Hurt since discharge from hospital.    11/15/23  L DP strongly biphasic, PT weakly biphasic  Lateral calf wound is much improved in appearance  Bone is still exposed  He will continue to followup with Dr Brooks as outpt  He will fu with me in 6 weeks  We did discuss hbo but pt does not have transportation that would make this feasible - he will let me know if that would change  24  L DP biphasic, PT weakly biphasic  Appearance of wound is better, bone is still exposed  Significant amount of swelling   Coban 2, drawtek - mwf  Pt will follow in wound center going forward  24  Wound improving, bone exposed  Insurance not covering Coban 2  Still with significant swelling  24  Wound stable  Bone remains exposed  Swelling improved  24  Bone exposed  Wound slightly larger but

## 2024-03-06 NOTE — PLAN OF CARE
Problem: Chronic Conditions and Co-morbidities  Goal: Patient's chronic conditions and co-morbidity symptoms are monitored and maintained or improved  Outcome: Progressing     Problem: Pain  Goal: Verbalizes/displays adequate comfort level or baseline comfort level  Outcome: Progressing     Problem: Cognitive:  Goal: Knowledge of wound care  Description: Knowledge of wound care  Outcome: Progressing  Goal: Understands risk factors for wounds  Description: Understands risk factors for wounds  Outcome: Progressing     Problem: Wound:  Goal: Will show signs of wound healing; wound closure and no evidence of infection  Description: Will show signs of wound healing; wound closure and no evidence of infection  Outcome: Progressing     Problem: Blood Glucose:  Goal: Ability to maintain appropriate glucose levels will improve  Description: Ability to maintain appropriate glucose levels will improve  Outcome: Progressing

## 2024-03-07 ENCOUNTER — TELEPHONE (OUTPATIENT)
Dept: PRIMARY CARE CLINIC | Age: 69
End: 2024-03-07

## 2024-03-07 DIAGNOSIS — G89.4 CHRONIC PAIN SYNDROME: ICD-10-CM

## 2024-03-07 DIAGNOSIS — E11.69 CONTROLLED TYPE 2 DIABETES MELLITUS WITH OTHER SPECIFIED COMPLICATION, WITH LONG-TERM CURRENT USE OF INSULIN (HCC): ICD-10-CM

## 2024-03-07 DIAGNOSIS — Z79.4 CONTROLLED TYPE 2 DIABETES MELLITUS WITH OTHER SPECIFIED COMPLICATION, WITH LONG-TERM CURRENT USE OF INSULIN (HCC): ICD-10-CM

## 2024-03-07 DIAGNOSIS — I73.9 PVD (PERIPHERAL VASCULAR DISEASE) (HCC): ICD-10-CM

## 2024-03-07 DIAGNOSIS — G89.18 POSTOPERATIVE PAIN: ICD-10-CM

## 2024-03-07 DIAGNOSIS — T87.9 AKA STUMP COMPLICATION (HCC): ICD-10-CM

## 2024-03-07 RX ORDER — OXYCODONE HCL 10 MG/1
10 TABLET, FILM COATED, EXTENDED RELEASE ORAL 2 TIMES DAILY
Qty: 60 TABLET | Refills: 0 | Status: SHIPPED | OUTPATIENT
Start: 2024-03-07 | End: 2024-04-06

## 2024-03-07 RX ORDER — OXYCODONE AND ACETAMINOPHEN 7.5; 325 MG/1; MG/1
1 TABLET ORAL EVERY 4 HOURS PRN
Qty: 120 TABLET | Refills: 0 | Status: SHIPPED | OUTPATIENT
Start: 2024-03-07 | End: 2024-04-06

## 2024-03-08 RX ORDER — NICOTINE 21 MG/24HR
1 PATCH, TRANSDERMAL 24 HOURS TRANSDERMAL EVERY 24 HOURS
Qty: 30 PATCH | Refills: 3 | Status: SHIPPED | OUTPATIENT
Start: 2024-03-08 | End: 2025-03-08

## 2024-03-11 ENCOUNTER — HOSPITAL ENCOUNTER (OUTPATIENT)
Dept: HYPERBARIC MEDICINE | Age: 69
Discharge: HOME | End: 2024-03-11
Payer: COMMERCIAL

## 2024-03-11 VITALS
SYSTOLIC BLOOD PRESSURE: 150 MMHG | DIASTOLIC BLOOD PRESSURE: 74 MMHG | OXYGEN SATURATION: 97 % | HEART RATE: 74 BPM | TEMPERATURE: 98.6 F | RESPIRATION RATE: 20 BRPM

## 2024-03-11 DIAGNOSIS — M86.462 CHRONIC OSTEOMYELITIS OF LEFT FIBULA WITH DRAINING SINUS (HCC): ICD-10-CM

## 2024-03-11 DIAGNOSIS — E11.622 DIABETIC ULCER OF LEFT CALF ASSOCIATED WITH TYPE 2 DIABETES MELLITUS, WITH NECROSIS OF BONE (HCC): Primary | ICD-10-CM

## 2024-03-11 DIAGNOSIS — L97.224 DIABETIC ULCER OF LEFT CALF ASSOCIATED WITH TYPE 2 DIABETES MELLITUS, WITH NECROSIS OF BONE (HCC): Primary | ICD-10-CM

## 2024-03-11 DIAGNOSIS — I70.243 ATHEROSCLEROSIS OF NATIVE ARTERY OF LEFT LEG WITH ULCERATION OF ANKLE (HCC): Chronic | ICD-10-CM

## 2024-03-11 LAB
GLUCOSE BLD-MCNC: 116 MG/DL (ref 74–99)
GLUCOSE BLD-MCNC: 134 MG/DL (ref 74–99)

## 2024-03-11 PROCEDURE — G0277 HBOT, FULL BODY CHAMBER, 30M: HCPCS

## 2024-03-11 PROCEDURE — 99183 HYPERBARIC OXYGEN THERAPY: CPT | Performed by: SURGERY

## 2024-03-11 PROCEDURE — 82962 GLUCOSE BLOOD TEST: CPT

## 2024-03-11 NOTE — DISCHARGE INSTRUCTIONS
walking or numbness and tingling of your arms and legs.  [x]If you are on prescription medicines from your personal doctor, you may continue to take these as directed.      Hyperbaric Medicine Department Information:    If you have questions or develop any of the symptoms mentioned, please contact the Hyperbaric Department at [phone] MONDAY - FRIDAY 8:30 am - 4:30 pm.  If you need help outside these hours and cannot wait until we are again available, contact your PCP or go to the hospital emergency room.     Additional Instructions:    Eat a high protein meal before treatment daily and take all medications as prescribed    Patient Signature:_______________________Date:_________Time:________    [] Patient unable to sign Discharge Instructions given to ECF/Transportation/POA    The information contained in the After Visit Summary has been reviewed with me, the patient and/or responsible adult, by my health care provider(s).  I had the opportunity to ask questions regarding this information.  I have elected to receive;  []  After Visit Summary  [x]  Comprehensive Discharge Instruction      Nurse Signature:_______________________Date:_________Time:_________    Electronically signed by Rudi Martinez LPN on 3/11/2024 at 12:37 PM

## 2024-03-12 ENCOUNTER — HOSPITAL ENCOUNTER (OUTPATIENT)
Dept: HYPERBARIC MEDICINE | Age: 69
Discharge: HOME | End: 2024-03-12
Payer: COMMERCIAL

## 2024-03-12 VITALS
TEMPERATURE: 98 F | RESPIRATION RATE: 16 BRPM | HEART RATE: 80 BPM | SYSTOLIC BLOOD PRESSURE: 138 MMHG | DIASTOLIC BLOOD PRESSURE: 72 MMHG

## 2024-03-12 DIAGNOSIS — L97.224 DIABETIC ULCER OF LEFT CALF ASSOCIATED WITH TYPE 2 DIABETES MELLITUS, WITH NECROSIS OF BONE (HCC): Primary | ICD-10-CM

## 2024-03-12 DIAGNOSIS — M86.462 CHRONIC OSTEOMYELITIS OF LEFT FIBULA WITH DRAINING SINUS (HCC): ICD-10-CM

## 2024-03-12 DIAGNOSIS — E11.622 DIABETIC ULCER OF LEFT CALF ASSOCIATED WITH TYPE 2 DIABETES MELLITUS, WITH NECROSIS OF BONE (HCC): Primary | ICD-10-CM

## 2024-03-12 LAB
GLUCOSE BLD-MCNC: 106 MG/DL (ref 74–99)
GLUCOSE BLD-MCNC: 138 MG/DL (ref 74–99)

## 2024-03-12 PROCEDURE — 82962 GLUCOSE BLOOD TEST: CPT

## 2024-03-12 PROCEDURE — G0277 HBOT, FULL BODY CHAMBER, 30M: HCPCS

## 2024-03-12 NOTE — DISCHARGE INSTRUCTIONS
Visit Discharge/Physician Orders     Discharge condition: Stable     Assessment of pain at discharge: yes     Anesthetic used: 4% lidocaine      Discharge to: Home     Left via:Private automobile     Accompanied by: family     ECF/HHA: [facility]      Dressing Orders: LEFT LATERAL ANKLE: Cleanse wound with normal saline. COVER WITH DRAWTEX. APPLY COBAN 2 WRAP TO  LEG. Change M-W (in [facility])-F.       IF WRAP FALLS 2 INCHES, OR IF PAIN INCREASES AND BECOMES INTOLERABLE,  REMOVE WRAP AND APPLY DOUBLE TUBIGRIP.  CALL WOUND CARE CENTER.       Treatment Orders: Eat a diet high in protein and vitamin C. Take a multiple vitamin daily unless contraindicated.      Elevate leg as much as possible.     Follow up with Dr. Brooks for foot care     STOP SMOKING!      [facility] followup visit : 1 week(in pm) _______________________  (Please note your next appointment above and if you are unable to keep, kindly give a 24 hour notice. Thank you.)     Physician signature:__________________________      If you experience any of the following, please call the Wound Care Center during business hours:     * Increase in Pain  * Temperature over 101  * Increase in drainage from your wound  * Drainage with a foul odor  * Bleeding  * Increase in swelling  * Need for compression bandage changes due to slippage, breakthrough drainage.     If you need medical attention outside of the business hours of the Wound Care Centers please contact your PCP or go to the nearest emergency room.

## 2024-03-12 NOTE — DISCHARGE INSTRUCTIONS
Discharge Instructions for  Hyperbaric Oxygen Therapy  [facility]  [address]  [place], [place] [address]  Telephone: [phone]     FAX [phone]    NAME:  Chris Stark  YOB: 1955  MEDICAL RECORD NUMBER:  [id number]  DATE:  3/12/2024    You have been treated with 100% oxygen in the Hyperbaric Chamber at the [facility] Wound Care Center.  There are usually no adverse effects or problems which follow this treatment.  However, for comfort and safety, we strongly recommend these guidelines are followed:    It is perfectly normal to feel tired after a hyperbaric treatment.  This tiredness should go away 2 to 3 days after your last treatment.  Avoid heavy exertion, exercise or other unnecessary activity if you are feeling tired.   Some people develop a feeling of fullness or stuffiness in their ears.  This is a result of a small amount of fluid build-up in the middle ear.  You may take an over the counter decongestant if approved by your doctor.  Follow the instructions in the medicine package for the amount to take.  If this problem lasts for more than 48 hours, please call your personal doctor.  You also may call or return to the [facility] Hyperbaric Medicine Department and have a Hyperbaric physician examine you.  In rare cases, patients may have so called “late effects” after the treatments.  Please call the Hyperbaric Medicine Department if you have any of these symptoms:     [x]Headache that is not relieved by over the counter pain medicine.  [x]Changes in vision.  During the course of many hyperbaric treatments (20 or more) some patients may complain of a temporary problem with sharp focus on distant objects.  This is a result of changes in the shape of the lens.  Your vision should return to normal in 6 to 8 weeks.  [x]Severe pain in your ears.  [x]Nausea or vomiting.  [x]Difficulty concentrating.  [x]Clumsiness, difficulty

## 2024-03-13 ENCOUNTER — HOSPITAL ENCOUNTER (OUTPATIENT)
Dept: HYPERBARIC MEDICINE | Age: 69
Discharge: HOME | End: 2024-03-13
Payer: COMMERCIAL

## 2024-03-13 ENCOUNTER — HOSPITAL ENCOUNTER (OUTPATIENT)
Dept: WOUND CARE | Age: 69
Discharge: HOME | End: 2024-03-13
Attending: SURGERY
Payer: COMMERCIAL

## 2024-03-13 VITALS
TEMPERATURE: 98.3 F | SYSTOLIC BLOOD PRESSURE: 128 MMHG | HEART RATE: 77 BPM | DIASTOLIC BLOOD PRESSURE: 58 MMHG | RESPIRATION RATE: 16 BRPM

## 2024-03-13 DIAGNOSIS — M86.462 CHRONIC OSTEOMYELITIS OF LEFT FIBULA WITH DRAINING SINUS (HCC): Primary | ICD-10-CM

## 2024-03-13 DIAGNOSIS — L97.224 DIABETIC ULCER OF LEFT CALF ASSOCIATED WITH TYPE 2 DIABETES MELLITUS, WITH NECROSIS OF BONE (HCC): ICD-10-CM

## 2024-03-13 DIAGNOSIS — I70.243 ATHEROSCLEROSIS OF NATIVE ARTERY OF LEFT LEG WITH ULCERATION OF ANKLE (HCC): ICD-10-CM

## 2024-03-13 DIAGNOSIS — E11.622 DIABETIC ULCER OF LEFT CALF ASSOCIATED WITH TYPE 2 DIABETES MELLITUS, WITH NECROSIS OF BONE (HCC): ICD-10-CM

## 2024-03-13 DIAGNOSIS — M86.462 CHRONIC OSTEOMYELITIS OF LEFT FIBULA WITH DRAINING SINUS (HCC): ICD-10-CM

## 2024-03-13 DIAGNOSIS — E11.622 DIABETIC ULCER OF LEFT CALF ASSOCIATED WITH TYPE 2 DIABETES MELLITUS, WITH NECROSIS OF BONE (HCC): Primary | ICD-10-CM

## 2024-03-13 DIAGNOSIS — L97.224 DIABETIC ULCER OF LEFT CALF ASSOCIATED WITH TYPE 2 DIABETES MELLITUS, WITH NECROSIS OF BONE (HCC): Primary | ICD-10-CM

## 2024-03-13 LAB
GLUCOSE BLD-MCNC: 125 MG/DL (ref 74–99)
GLUCOSE BLD-MCNC: 168 MG/DL (ref 74–99)

## 2024-03-13 PROCEDURE — 11046 DBRDMT MUSC&/FSCA EA ADDL: CPT | Performed by: SURGERY

## 2024-03-13 PROCEDURE — G0277 HBOT, FULL BODY CHAMBER, 30M: HCPCS

## 2024-03-13 PROCEDURE — 11043 DBRDMT MUSC&/FSCA 1ST 20/<: CPT

## 2024-03-13 PROCEDURE — 11043 DBRDMT MUSC&/FSCA 1ST 20/<: CPT | Performed by: SURGERY

## 2024-03-13 PROCEDURE — 11046 DBRDMT MUSC&/FSCA EA ADDL: CPT

## 2024-03-13 PROCEDURE — 82962 GLUCOSE BLOOD TEST: CPT

## 2024-03-13 RX ORDER — LIDOCAINE 40 MG/G
CREAM TOPICAL ONCE
OUTPATIENT
Start: 2024-03-13 | End: 2024-03-13

## 2024-03-13 RX ORDER — GENTAMICIN SULFATE 1 MG/G
OINTMENT TOPICAL ONCE
OUTPATIENT
Start: 2024-03-13 | End: 2024-03-13

## 2024-03-13 RX ORDER — IBUPROFEN 200 MG
TABLET ORAL ONCE
OUTPATIENT
Start: 2024-03-13 | End: 2024-03-13

## 2024-03-13 RX ORDER — BETAMETHASONE DIPROPIONATE 0.5 MG/G
CREAM TOPICAL ONCE
OUTPATIENT
Start: 2024-03-13 | End: 2024-03-13

## 2024-03-13 RX ORDER — CLOBETASOL PROPIONATE 0.5 MG/G
OINTMENT TOPICAL ONCE
OUTPATIENT
Start: 2024-03-13 | End: 2024-03-13

## 2024-03-13 RX ORDER — TRIAMCINOLONE ACETONIDE 1 MG/G
OINTMENT TOPICAL ONCE
OUTPATIENT
Start: 2024-03-13 | End: 2024-03-13

## 2024-03-13 RX ORDER — LIDOCAINE 50 MG/G
OINTMENT TOPICAL ONCE
OUTPATIENT
Start: 2024-03-13 | End: 2024-03-13

## 2024-03-13 RX ORDER — BACITRACIN ZINC 500 [USP'U]/G
OINTMENT TOPICAL ONCE
OUTPATIENT
Start: 2024-03-13 | End: 2024-03-13

## 2024-03-13 RX ORDER — BACITRACIN ZINC AND POLYMYXIN B SULFATE 500; 1000 [USP'U]/G; [USP'U]/G
OINTMENT TOPICAL ONCE
OUTPATIENT
Start: 2024-03-13 | End: 2024-03-13

## 2024-03-13 RX ORDER — LIDOCAINE HYDROCHLORIDE 40 MG/ML
SOLUTION TOPICAL ONCE
OUTPATIENT
Start: 2024-03-13 | End: 2024-03-13

## 2024-03-13 RX ORDER — LIDOCAINE HYDROCHLORIDE 40 MG/ML
SOLUTION TOPICAL ONCE
Status: COMPLETED | OUTPATIENT
Start: 2024-03-13 | End: 2024-03-13

## 2024-03-13 RX ORDER — LIDOCAINE HYDROCHLORIDE 20 MG/ML
JELLY TOPICAL ONCE
OUTPATIENT
Start: 2024-03-13 | End: 2024-03-13

## 2024-03-13 RX ORDER — SODIUM CHLOR/HYPOCHLOROUS ACID 0.033 %
SOLUTION, IRRIGATION IRRIGATION ONCE
OUTPATIENT
Start: 2024-03-13 | End: 2024-03-13

## 2024-03-13 RX ADMIN — LIDOCAINE HYDROCHLORIDE 10 ML: 40 SOLUTION TOPICAL at 13:22

## 2024-03-13 NOTE — PROGRESS NOTES
Chris Stark is a 68 y.o. male has been receiving hyperbaric oxygen treatment for management of:Indication  Indications: Lower Extremity Diabetic Wound ___(site) (LLE). Mr. Stark has completed Treatment Number: 3 out of a treatment protocol of Total Treatments: 30.    Problem List:  Patient Active Problem List   Diagnosis Code    DM II (diabetes mellitus, type II), controlled (Formerly [facility]) E11.9    HTN (hypertension) I10    PVD (peripheral vascular disease) (Formerly [facility]) I73.9    Leukocytosis D72.829    CKD (chronic kidney disease) stage 3, GFR 30-59 ml/min (Formerly [facility]) N18.30    Tobacco dependence F17.200    HLD (hyperlipidemia) E78.5    History of vascular surgery Z98.890    Above knee amputation of right lower extremity (Formerly [facility]) S78.111A    Postoperative pain G89.18    Chronic pain syndrome G89.4    Elevated sedimentation rate R70.0    Major depressive disorder, single episode, unspecified F32.9    Muscle weakness (generalized) M62.81    Obstructive sleep apnea (adult) (pediatric) G47.33    Benign hypertensive kidney disease with chronic kidney disease I12.9    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    Secondary hyperparathyroidism of renal origin (Formerly [facility]) N25.81    Vitamin D deficiency E55.9    Open wound of left lower leg S81.802A    Osteomyelitis of left fibula (Formerly [facility]) M86.9    Atherosclerosis of native artery of left leg with ulceration of ankle (Formerly [facility]) I70.243    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    Pt is tolerating hyperbaric oxygen therapy  well without complications.         [place] Yu, ZAKIYA  3/13/2024  2:58 PM

## 2024-03-13 NOTE — PLAN OF CARE
Problem: Cognitive:  Goal: Understand HBO therapy goals, procedures, and potential hazards.  Description: Understand HBO therapy goals, procedures, and potential hazards.  Outcome: Progressing     Problem: Physical Regulation:  Goal: Tolerate HBO therapy and barotrauma will be prevented  Description: Tolerate HBO therapy and barotrauma will be prevented  Outcome: Progressing

## 2024-03-13 NOTE — PROGRESS NOTES
Physical Exam Findings present involving Bilateral lower extremity.      There is  evidence of trunkal edema.    Positive if checked   Findings   []  hyperkeratosis   []  hyperplasia   []  hyperpigmentation   []  papillomas   []  Skin breakdown with lymphorrhea (weeping)   []  Elephantiasis     Patient continues to have significant symptomatology.    The following symptoms remain uncontrolled for this patient.  Positive if checked    symptoms    []   swelling    []   fibrosis    []   pain    []   skin breakdown      Patient has undergone the following conservative therapy of elevation, exercise, (gradient compression of at least 30 mm hg distally) and continues to have significant symptomatology after at least 4 weeks of this therapy.    The patient has not had significant improvement in their symptomatology with use of compression wraps, elevation, exercise.    Prognosis : good    Venous Ulcer patients  Location a venous stasis ulcer - L LE  Edema in the affected area is present  The ulcer has been continuously present for more than 6 months   Previous treatments for the past 6 months include   Elevation  Exercise  Appropriate dressings for the ulcer  Compression wraps - bandages  Local wound care with sharp debridement has been regularly preformed for more than 6 months   Patient has been coming on a regular basis for treatment of their venous stasis ulcers for more than 6 months       
Increase in swelling  * Need for compression bandage changes due to slippage, breakthrough drainage.     If you need medical attention outside of the business hours of the Wound Care Centers please contact your PCP or go to the nearest emergency room.         Electronically signed by Sydnee Baca MD

## 2024-03-15 ENCOUNTER — TELEPHONE (OUTPATIENT)
Dept: NEUROLOGY | Age: 69
End: 2024-03-15

## 2024-03-15 RX ORDER — PRIMIDONE 50 MG/1
50 TABLET ORAL NIGHTLY
Qty: 90 TABLET | Refills: 0 | Status: SHIPPED | OUTPATIENT
Start: 2024-03-15 | End: 2024-06-13

## 2024-03-15 RX ORDER — CLONIDINE HYDROCHLORIDE 0.1 MG/1
0.1 TABLET ORAL 3 TIMES DAILY
Qty: 90 TABLET | Refills: 10 | Status: SHIPPED | OUTPATIENT
Start: 2024-03-15

## 2024-03-15 NOTE — DISCHARGE INSTRUCTIONS
Visit Discharge/Physician Orders     Discharge condition: Stable     Assessment of pain at discharge: yes     Anesthetic used: 4% lidocaine      Discharge to: Home     Left via:Private automobile     Accompanied by: family     ECF/HHA: [facility]      Dressing Orders: LEFT LATERAL ANKLE: Cleanse wound with normal saline. COVER WITH DRAWTEX. APPLY COBAN 2 WRAP TO  LEG. Change M-W (in [facility])-F.       IF WRAP FALLS 2 INCHES, OR IF PAIN INCREASES AND BECOMES INTOLERABLE,  REMOVE WRAP AND APPLY DOUBLE TUBIGRIP.  CALL WOUND CARE CENTER.       Treatment Orders: Eat a diet high in protein and vitamin C. Take a multiple vitamin daily unless contraindicated.      Elevate leg as much as possible.     Follow up with Dr. rBooks for foot care     STOP SMOKING!     Angiogram in future- Dr. KAMINSKI office will call and schedule.      [facility] followup visit : 1 week(in pm) _______________________  (Please note your next appointment above and if you are unable to keep, kindly give a 24 hour notice. Thank you.)     Physician signature:__________________________      If you experience any of the following, please call the Wound Care Center during business hours:     * Increase in Pain  * Temperature over 101  * Increase in drainage from your wound  * Drainage with a foul odor  * Bleeding  * Increase in swelling  * Need for compression bandage changes due to slippage, breakthrough drainage.     If you need medical attention outside of the business hours of the Wound Care Centers please contact your PCP or go to the nearest emergency room.

## 2024-03-18 ENCOUNTER — HOSPITAL ENCOUNTER (OUTPATIENT)
Dept: HYPERBARIC MEDICINE | Age: 69
Discharge: HOME | End: 2024-03-18

## 2024-03-19 ENCOUNTER — HOSPITAL ENCOUNTER (OUTPATIENT)
Dept: HYPERBARIC MEDICINE | Age: 69
Discharge: HOME | End: 2024-03-19
Payer: COMMERCIAL

## 2024-03-19 VITALS
SYSTOLIC BLOOD PRESSURE: 150 MMHG | DIASTOLIC BLOOD PRESSURE: 56 MMHG | RESPIRATION RATE: 16 BRPM | HEART RATE: 70 BPM | TEMPERATURE: 97.1 F

## 2024-03-19 DIAGNOSIS — M86.462 CHRONIC OSTEOMYELITIS OF LEFT FIBULA WITH DRAINING SINUS (HCC): ICD-10-CM

## 2024-03-19 DIAGNOSIS — L97.224 DIABETIC ULCER OF LEFT CALF ASSOCIATED WITH TYPE 2 DIABETES MELLITUS, WITH NECROSIS OF BONE (HCC): Primary | ICD-10-CM

## 2024-03-19 DIAGNOSIS — E11.622 DIABETIC ULCER OF LEFT CALF ASSOCIATED WITH TYPE 2 DIABETES MELLITUS, WITH NECROSIS OF BONE (HCC): Primary | ICD-10-CM

## 2024-03-19 LAB
GLUCOSE BLD-MCNC: 167 MG/DL (ref 74–99)
GLUCOSE BLD-MCNC: 63 MG/DL (ref 74–99)
GLUCOSE BLD-MCNC: 88 MG/DL (ref 74–99)
GLUCOSE BLD-MCNC: 88 MG/DL (ref 74–99)

## 2024-03-19 PROCEDURE — G0277 HBOT, FULL BODY CHAMBER, 30M: HCPCS

## 2024-03-19 PROCEDURE — 82962 GLUCOSE BLOOD TEST: CPT

## 2024-03-19 PROCEDURE — 99183 HYPERBARIC OXYGEN THERAPY: CPT | Performed by: SURGERY

## 2024-03-19 NOTE — PROGRESS NOTES
[facility]  Hyperbaric Oxygen Therapy   Progress Note    NAME: Chris Stark  MEDICAL RECORD NUMBER:  [id number]  AGE: 68 y.o.   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  See doc flowsheets for documentation.  Objective      Please see lab results for finger stick glucose results  Pre treatment Vital Signs       Temp: 97.5 °F (36.4 °C)     Pulse: 85     Respirations: 18     BP: 138/60     Post treatment Vital Signs  Temp: 97.1 °F (36.2 °C)  Pulse: 70  Respirations: 16  BP: (!) 150/56  Assessment      Physical Exam:  General Appearance:  alert and oriented to person, place and time, well-developed and well-nourished, in no acute distress  ENT:  tympanic membranes intact bilaterally  Pulmonary/Chest:  clear to auscultation bilaterally- no wheezes, rales or rhonchi, normal air movement, no respiratory distress  Cardiovascular:  regular rate and rhythm     Treatment Start Time: 0955     Pressure Reached Time: 1005  ALEXUS : 2  Number of Air Breaks:  Treatment Status: No Air break     Decompression Time: 1135   Treatment End Time: 1143  Length of Treatment: 90 Minutes  Symptoms Noted During Treatment: None  Total Treatment Time (min): 108    Adverse Event: no    I was present on these premises and immediately available to furnish assistance & direction throughout the procedure.   Plan      Chris Stark is a

## 2024-03-19 NOTE — DISCHARGE INSTRUCTIONS
Discharge Instructions for  Hyperbaric Oxygen Therapy  [facility]  [address]  [place], [place] [address]  Telephone: [phone]     FAX [phone]    NAME:  Chris Stark  YOB: 1955  MEDICAL RECORD NUMBER:  [id number]  DATE:  3/19/2024    You have been treated with 100% oxygen in the Hyperbaric Chamber at the [facility].  There are usually no adverse effects or problems which follow this treatment.  However, for comfort and safety, we strongly recommend these guidelines are followed:    It is perfectly normal to feel tired after a hyperbaric treatment.  This tiredness should go away 2 to 3 days after your last treatment.  Avoid heavy exertion, exercise or other unnecessary activity if you are feeling tired.   Some people develop a feeling of fullness or stuffiness in their ears.  This is a result of a small amount of fluid build-up in the middle ear.  You may take an over the counter decongestant if approved by your doctor.  Follow the instructions in the medicine package for the amount to take.  If this problem lasts for more than 48 hours, please call your personal doctor.  You also may call or return to the [facility] Hyperbaric Medicine Department and have a Hyperbaric physician examine you.  In rare cases, patients may have so called “late effects” after the treatments.  Please call the Hyperbaric Medicine Department if you have any of these symptoms:     [x]Headache that is not relieved by over the counter pain medicine.  [x]Changes in vision.  During the course of many hyperbaric treatments (20 or more) some patients may complain of a temporary problem with sharp focus on distant objects.  This is a result of changes in the shape of the lens.  Your vision should return to normal in 6 to 8 weeks.  [x]Severe pain in your ears.  [x]Nausea or vomiting.  [x]Difficulty concentrating.  [x]Clumsiness, difficulty

## 2024-03-20 ENCOUNTER — HOSPITAL ENCOUNTER (OUTPATIENT)
Dept: WOUND CARE | Age: 69
Discharge: HOME | End: 2024-03-20
Attending: SURGERY
Payer: COMMERCIAL

## 2024-03-20 ENCOUNTER — TELEPHONE (OUTPATIENT)
Dept: VASCULAR SURGERY | Age: 69
End: 2024-03-20

## 2024-03-20 ENCOUNTER — HOSPITAL ENCOUNTER (OUTPATIENT)
Dept: HYPERBARIC MEDICINE | Age: 69
Discharge: HOME | End: 2024-03-20
Payer: COMMERCIAL

## 2024-03-20 VITALS
SYSTOLIC BLOOD PRESSURE: 136 MMHG | HEIGHT: 74 IN | BODY MASS INDEX: 31.44 KG/M2 | WEIGHT: 245 LBS | DIASTOLIC BLOOD PRESSURE: 74 MMHG | RESPIRATION RATE: 18 BRPM | HEART RATE: 100 BPM | TEMPERATURE: 97 F

## 2024-03-20 VITALS
HEART RATE: 65 BPM | DIASTOLIC BLOOD PRESSURE: 72 MMHG | SYSTOLIC BLOOD PRESSURE: 155 MMHG | TEMPERATURE: 97.4 F | RESPIRATION RATE: 18 BRPM

## 2024-03-20 DIAGNOSIS — E11.622 DIABETIC ULCER OF LEFT CALF ASSOCIATED WITH TYPE 2 DIABETES MELLITUS, WITH NECROSIS OF BONE (HCC): Primary | ICD-10-CM

## 2024-03-20 DIAGNOSIS — I70.243 ATHEROSCLEROSIS OF NATIVE ARTERY OF LEFT LEG WITH ULCERATION OF ANKLE (HCC): ICD-10-CM

## 2024-03-20 DIAGNOSIS — E11.622 DIABETIC ULCER OF LEFT CALF ASSOCIATED WITH TYPE 2 DIABETES MELLITUS, WITH NECROSIS OF BONE (HCC): ICD-10-CM

## 2024-03-20 DIAGNOSIS — L97.224 DIABETIC ULCER OF LEFT CALF ASSOCIATED WITH TYPE 2 DIABETES MELLITUS, WITH NECROSIS OF BONE (HCC): ICD-10-CM

## 2024-03-20 DIAGNOSIS — M86.462 CHRONIC OSTEOMYELITIS OF LEFT FIBULA WITH DRAINING SINUS (HCC): Primary | ICD-10-CM

## 2024-03-20 DIAGNOSIS — L97.224 DIABETIC ULCER OF LEFT CALF ASSOCIATED WITH TYPE 2 DIABETES MELLITUS, WITH NECROSIS OF BONE (HCC): Primary | ICD-10-CM

## 2024-03-20 LAB
GLUCOSE BLD-MCNC: 219 MG/DL (ref 74–99)
GLUCOSE BLD-MCNC: 77 MG/DL (ref 74–99)

## 2024-03-20 PROCEDURE — G0277 HBOT, FULL BODY CHAMBER, 30M: HCPCS

## 2024-03-20 PROCEDURE — 11043 DBRDMT MUSC&/FSCA 1ST 20/<: CPT

## 2024-03-20 PROCEDURE — 82962 GLUCOSE BLOOD TEST: CPT

## 2024-03-20 PROCEDURE — 11046 DBRDMT MUSC&/FSCA EA ADDL: CPT

## 2024-03-20 RX ORDER — BETAMETHASONE DIPROPIONATE 0.5 MG/G
CREAM TOPICAL ONCE
OUTPATIENT
Start: 2024-03-20 | End: 2024-03-20

## 2024-03-20 RX ORDER — BACITRACIN ZINC 500 [USP'U]/G
OINTMENT TOPICAL ONCE
OUTPATIENT
Start: 2024-03-20 | End: 2024-03-20

## 2024-03-20 RX ORDER — GENTAMICIN SULFATE 1 MG/G
OINTMENT TOPICAL ONCE
OUTPATIENT
Start: 2024-03-20 | End: 2024-03-20

## 2024-03-20 RX ORDER — LIDOCAINE HYDROCHLORIDE 40 MG/ML
SOLUTION TOPICAL ONCE
OUTPATIENT
Start: 2024-03-20 | End: 2024-03-20

## 2024-03-20 RX ORDER — LIDOCAINE 40 MG/G
CREAM TOPICAL ONCE
OUTPATIENT
Start: 2024-03-20 | End: 2024-03-20

## 2024-03-20 RX ORDER — LIDOCAINE HYDROCHLORIDE 20 MG/ML
JELLY TOPICAL ONCE
OUTPATIENT
Start: 2024-03-20 | End: 2024-03-20

## 2024-03-20 RX ORDER — SODIUM CHLOR/HYPOCHLOROUS ACID 0.033 %
SOLUTION, IRRIGATION IRRIGATION ONCE
OUTPATIENT
Start: 2024-03-20 | End: 2024-03-20

## 2024-03-20 RX ORDER — LIDOCAINE 50 MG/G
OINTMENT TOPICAL ONCE
OUTPATIENT
Start: 2024-03-20 | End: 2024-03-20

## 2024-03-20 RX ORDER — CLOBETASOL PROPIONATE 0.5 MG/G
OINTMENT TOPICAL ONCE
OUTPATIENT
Start: 2024-03-20 | End: 2024-03-20

## 2024-03-20 RX ORDER — BACITRACIN ZINC AND POLYMYXIN B SULFATE 500; 1000 [USP'U]/G; [USP'U]/G
OINTMENT TOPICAL ONCE
OUTPATIENT
Start: 2024-03-20 | End: 2024-03-20

## 2024-03-20 RX ORDER — IBUPROFEN 200 MG
TABLET ORAL ONCE
OUTPATIENT
Start: 2024-03-20 | End: 2024-03-20

## 2024-03-20 RX ORDER — LIDOCAINE HYDROCHLORIDE 40 MG/ML
SOLUTION TOPICAL ONCE
Status: COMPLETED | OUTPATIENT
Start: 2024-03-20 | End: 2024-03-20

## 2024-03-20 RX ORDER — TRIAMCINOLONE ACETONIDE 1 MG/G
OINTMENT TOPICAL ONCE
OUTPATIENT
Start: 2024-03-20 | End: 2024-03-20

## 2024-03-20 RX ADMIN — LIDOCAINE HYDROCHLORIDE 20 ML: 40 SOLUTION TOPICAL at 13:59

## 2024-03-20 NOTE — TELEPHONE ENCOUNTER
Scheduled abdominal aortogram possible intervention with Dr. Baca 4/23/24 at 11:30 a.m.  Pt notified.  Instructions to report to [address] registration at 9:30 a.m, to be NPO after midnight the night before except heart and/or BP meds in the a.m with sips of water and to have transportation left on his voicemail.

## 2024-03-20 NOTE — PROGRESS NOTES
Measurements:  Are located in the Wound/Ulcer Documentation Flow Sheet  Post Debridement Measurements:  Wound/Ulcer Descriptions are Pre Debridement except measurements:    Wound 09/26/23 Pretibial Distal;Left (Active)   Number of days: 176       Wound 11/15/23 Ankle Left;Lateral #1 left lateral ankle (Active)   Wound Image   03/20/24 1351   Wound Etiology Venous 02/07/24 1651   Dressing Status New dressing applied 03/13/24 1406   Wound Cleansed Cleansed with saline 03/13/24 1406   Dressing/Treatment ABD 03/13/24 1406   Offloading for Diabetic Foot Ulcers Offloading ordered 03/13/24 1406   Wound Length (cm) 8.5 cm 03/20/24 1351   Wound Width (cm) 6.9 cm 03/20/24 1351   Wound Depth (cm) 1.1 cm 03/20/24 1351   Wound Surface Area (cm^2) 58.65 cm^2 03/20/24 1351   Change in Wound Size % (l*w) 22.51 03/20/24 1351   Wound Volume (cm^3) 64.515 cm^3 03/20/24 1351   Wound Healing % 39 03/20/24 1351   Post-Procedure Length (cm) 8.6 cm 03/20/24 1436   Post-Procedure Width (cm) 7 cm 03/20/24 1436   Post-Procedure Depth (cm) 1.2 cm 03/20/24 1436   Post-Procedure Surface Area (cm^2) 60.2 cm^2 03/20/24 1436   Post-Procedure Volume (cm^3) 72.24 cm^3 03/20/24 1436   Wound Assessment Pale granulation tissue;Pink/red;Fibrin 03/20/24 1351   Drainage Amount Large (50-75% saturated) 03/20/24 1351   Drainage Description Yellow;Green 03/20/24 1351   Odor None 03/20/24 1351   Nadia-wound Assessment Intact 03/20/24 1351   Margins Attached edges 11/15/23 1345   Wound Thickness Description not for Pressure Injury Full thickness 11/15/23 1345   Number of days: 125          Procedure Note  Indications:  Based on my examination of this patient's wound(s)/ulcer(s) today, debridement is required to promote healing and evaluate the wound base.    Performed by: Sydnee Baca MD    Consent obtained:  Yes    Time out taken:  Yes    Pain Control: Anesthetic  Anesthetic: 4% Lidocaine Liquid Topical     Debridement:Excisional Debridement    Using

## 2024-03-21 ENCOUNTER — HOSPITAL ENCOUNTER (OUTPATIENT)
Dept: HYPERBARIC MEDICINE | Age: 69
Discharge: HOME | End: 2024-03-21
Payer: COMMERCIAL

## 2024-03-21 VITALS
RESPIRATION RATE: 16 BRPM | DIASTOLIC BLOOD PRESSURE: 79 MMHG | TEMPERATURE: 97.8 F | SYSTOLIC BLOOD PRESSURE: 139 MMHG | HEART RATE: 91 BPM

## 2024-03-21 DIAGNOSIS — L97.224 DIABETIC ULCER OF LEFT CALF ASSOCIATED WITH TYPE 2 DIABETES MELLITUS, WITH NECROSIS OF BONE (HCC): Primary | ICD-10-CM

## 2024-03-21 DIAGNOSIS — M86.462 CHRONIC OSTEOMYELITIS OF LEFT FIBULA WITH DRAINING SINUS (HCC): ICD-10-CM

## 2024-03-21 DIAGNOSIS — E11.622 DIABETIC ULCER OF LEFT CALF ASSOCIATED WITH TYPE 2 DIABETES MELLITUS, WITH NECROSIS OF BONE (HCC): Primary | ICD-10-CM

## 2024-03-21 LAB
GLUCOSE BLD-MCNC: 186 MG/DL (ref 74–99)
GLUCOSE BLD-MCNC: 188 MG/DL (ref 74–99)

## 2024-03-21 PROCEDURE — 82962 GLUCOSE BLOOD TEST: CPT

## 2024-03-21 PROCEDURE — G0277 HBOT, FULL BODY CHAMBER, 30M: HCPCS

## 2024-03-21 NOTE — DISCHARGE INSTRUCTIONS
Discharge Instructions for  Hyperbaric Oxygen Therapy  [facility]  [address]  [place], [place] [address]  Telephone: [phone]     FAX [phone]    NAME:  Chris Stark  YOB: 1955  MEDICAL RECORD NUMBER:  [id number]  DATE:  3/21/2024    You have been treated with 100% oxygen in the Hyperbaric Chamber at the [facility].  There are usually no adverse effects or problems which follow this treatment.  However, for comfort and safety, we strongly recommend these guidelines are followed:    It is perfectly normal to feel tired after a hyperbaric treatment.  This tiredness should go away 2 to 3 days after your last treatment.  Avoid heavy exertion, exercise or other unnecessary activity if you are feeling tired.   Some people develop a feeling of fullness or stuffiness in their ears.  This is a result of a small amount of fluid build-up in the middle ear.  You may take an over the counter decongestant if approved by your doctor.  Follow the instructions in the medicine package for the amount to take.  If this problem lasts for more than 48 hours, please call your personal doctor.  You also may call or return to the [facility] Hyperbaric Medicine Department and have a Hyperbaric physician examine you.  In rare cases, patients may have so called “late effects” after the treatments.  Please call the Hyperbaric Medicine Department if you have any of these symptoms:     [x]Headache that is not relieved by over the counter pain medicine.  [x]Changes in vision.  During the course of many hyperbaric treatments (20 or more) some patients may complain of a temporary problem with sharp focus on distant objects.  This is a result of changes in the shape of the lens.  Your vision should return to normal in 6 to 8 weeks.  [x]Severe pain in your ears.  [x]Nausea or vomiting.  [x]Difficulty concentrating.  [x]Clumsiness, difficulty

## 2024-03-22 NOTE — DISCHARGE INSTRUCTIONS
Visit Discharge/Physician Orders     Discharge condition: Stable     Assessment of pain at discharge: yes     Anesthetic used: 4% lidocaine      Discharge to: Home     Left via:Private automobile     Accompanied by: family     ECF/HHA: [facility] *New order*     Dressing Orders: LEFT LATERAL ANKLE: Cleanse wound with normal saline. COVER WITH DRAWTEX. APPLY Profore Change M-W (in [facility])-F.       IF WRAP FALLS 2 INCHES, OR IF PAIN INCREASES AND BECOMES INTOLERABLE,  REMOVE WRAP AND APPLY DOUBLE TUBIGRIP.  CALL WOUND CARE CENTER.       Treatment Orders: Eat a diet high in protein and vitamin C. Take a multiple vitamin daily unless contraindicated.      Elevate leg as much as possible.     Follow up with Dr. Brooks for foot care     STOP SMOKING!      Angiogram in future- Dr. KAMINSKI office will call and schedule.      [facility] followup visit : 1 week(in pm) _______________________  (Please note your next appointment above and if you are unable to keep, kindly give a 24 hour notice. Thank you.)     Physician signature:__________________________      If you experience any of the following, please call the Wound Care Center during business hours:     * Increase in Pain  * Temperature over 101  * Increase in drainage from your wound  * Drainage with a foul odor  * Bleeding  * Increase in swelling  * Need for compression bandage changes due to slippage, breakthrough drainage.     If you need medical attention outside of the business hours of the Wound Care Centers please contact your PCP or go to the nearest emergency room.

## 2024-03-25 ENCOUNTER — HOSPITAL ENCOUNTER (OUTPATIENT)
Dept: HYPERBARIC MEDICINE | Age: 69
Discharge: HOME | End: 2024-03-25
Payer: COMMERCIAL

## 2024-03-25 VITALS
RESPIRATION RATE: 19 BRPM | HEART RATE: 74 BPM | DIASTOLIC BLOOD PRESSURE: 84 MMHG | SYSTOLIC BLOOD PRESSURE: 170 MMHG | TEMPERATURE: 98.8 F

## 2024-03-25 DIAGNOSIS — L97.224 DIABETIC ULCER OF LEFT CALF ASSOCIATED WITH TYPE 2 DIABETES MELLITUS, WITH NECROSIS OF BONE (HCC): Primary | ICD-10-CM

## 2024-03-25 DIAGNOSIS — M86.462 CHRONIC OSTEOMYELITIS OF LEFT FIBULA WITH DRAINING SINUS (HCC): ICD-10-CM

## 2024-03-25 DIAGNOSIS — E11.622 DIABETIC ULCER OF LEFT CALF ASSOCIATED WITH TYPE 2 DIABETES MELLITUS, WITH NECROSIS OF BONE (HCC): Primary | ICD-10-CM

## 2024-03-25 LAB
GLUCOSE BLD-MCNC: 128 MG/DL (ref 74–99)
GLUCOSE BLD-MCNC: 162 MG/DL (ref 74–99)
GLUCOSE BLD-MCNC: 194 MG/DL (ref 74–99)

## 2024-03-25 PROCEDURE — G0277 HBOT, FULL BODY CHAMBER, 30M: HCPCS

## 2024-03-25 PROCEDURE — 99183 HYPERBARIC OXYGEN THERAPY: CPT | Performed by: SURGERY

## 2024-03-25 PROCEDURE — 82962 GLUCOSE BLOOD TEST: CPT

## 2024-03-25 NOTE — PROGRESS NOTES
[facility]  Hyperbaric Oxygen Therapy   Progress Note    NAME: Chris Stark  MEDICAL RECORD NUMBER:  [id number]  AGE: 68 y.o.   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  See doc flowsheets for documentation.  Objective      Please see lab results for finger stick glucose results  Pre treatment Vital Signs       Temp: 98.8 °F (37.1 °C)     Pulse: 74     Respirations: 19     BP: (!) 170/84     Post treatment Vital Signs  Temp: 98.8 °F (37.1 °C)  Pulse: 74  Respirations: 19  BP: (!) 170/84  Assessment      Physical Exam:  General Appearance:  alert and oriented to person, place and time, well-developed and well-nourished, in no acute distress  ENT:  tympanic membranes intact bilaterally  Pulmonary/Chest:  clear to auscultation bilaterally- no wheezes, rales or rhonchi, normal air movement, no respiratory distress  Cardiovascular:  regular rate and rhythm  Chamber #: [id number]  Treatment Start Time: 1029     Pressure Reached Time: 1039  ALEXUS : 2  Number of Air Breaks:  Treatment Status: No Air break     Decompression Time: 1209   Treatment End Time: 1218  Length of Treatment: 90 Minutes  Symptoms Noted During Treatment: None  Total Treatment Time (min): 109    Adverse Event: no    I was present on these premises and immediately available to furnish assistance & direction throughout the procedure.   Plan

## 2024-03-25 NOTE — DISCHARGE INSTRUCTIONS
Discharge Instructions for  Hyperbaric Oxygen Therapy  [facility]  [address]  [place], [place] [address]  Telephone: [phone]     FAX [phone]    NAME:  Chris Stark  YOB: 1955  MEDICAL RECORD NUMBER:  [id number]  DATE:  3/25/2024    You have been treated with 100% oxygen in the Hyperbaric Chamber at the [facility].  There are usually no adverse effects or problems which follow this treatment.  However, for comfort and safety, we strongly recommend these guidelines are followed:    It is perfectly normal to feel tired after a hyperbaric treatment.  This tiredness should go away 2 to 3 days after your last treatment.  Avoid heavy exertion, exercise or other unnecessary activity if you are feeling tired.   Some people develop a feeling of fullness or stuffiness in their ears.  This is a result of a small amount of fluid build-up in the middle ear.  You may take an over the counter decongestant if approved by your doctor.  Follow the instructions in the medicine package for the amount to take.  If this problem lasts for more than 48 hours, please call your personal doctor.  You also may call or return to the [facility] Hyperbaric Medicine Department and have a Hyperbaric physician examine you.  In rare cases, patients may have so called “late effects” after the treatments.  Please call the Hyperbaric Medicine Department if you have any of these symptoms:     [x]Headache that is not relieved by over the counter pain medicine.  [x]Changes in vision.  During the course of many hyperbaric treatments (20 or more) some patients may complain of a temporary problem with sharp focus on distant objects.  This is a result of changes in the shape of the lens.  Your vision should return to normal in 6 to 8 weeks.  [x]Severe pain in your ears.  [x]Nausea or vomiting.  [x]Difficulty concentrating.  [x]Clumsiness, difficulty

## 2024-03-26 ENCOUNTER — HOSPITAL ENCOUNTER (OUTPATIENT)
Dept: HYPERBARIC MEDICINE | Age: 69
Discharge: HOME | End: 2024-03-26
Payer: COMMERCIAL

## 2024-03-26 VITALS
DIASTOLIC BLOOD PRESSURE: 74 MMHG | HEART RATE: 76 BPM | TEMPERATURE: 97.2 F | RESPIRATION RATE: 18 BRPM | SYSTOLIC BLOOD PRESSURE: 159 MMHG

## 2024-03-26 DIAGNOSIS — M86.462 CHRONIC OSTEOMYELITIS OF LEFT FIBULA WITH DRAINING SINUS (HCC): ICD-10-CM

## 2024-03-26 DIAGNOSIS — E11.622 DIABETIC ULCER OF LEFT CALF ASSOCIATED WITH TYPE 2 DIABETES MELLITUS, WITH NECROSIS OF BONE (HCC): Primary | ICD-10-CM

## 2024-03-26 DIAGNOSIS — L97.224 DIABETIC ULCER OF LEFT CALF ASSOCIATED WITH TYPE 2 DIABETES MELLITUS, WITH NECROSIS OF BONE (HCC): Primary | ICD-10-CM

## 2024-03-26 LAB
GLUCOSE BLD-MCNC: 138 MG/DL (ref 74–99)
GLUCOSE BLD-MCNC: 142 MG/DL (ref 74–99)

## 2024-03-26 PROCEDURE — 99183 HYPERBARIC OXYGEN THERAPY: CPT | Performed by: SURGERY

## 2024-03-26 PROCEDURE — G0277 HBOT, FULL BODY CHAMBER, 30M: HCPCS

## 2024-03-26 PROCEDURE — 82962 GLUCOSE BLOOD TEST: CPT

## 2024-03-26 NOTE — PROGRESS NOTES
[facility]  Hyperbaric Oxygen Therapy   Progress Note    NAME: Chris Stark  MEDICAL RECORD NUMBER:  [id number]  AGE: 68 y.o.   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  See doc flowsheets for documentation.  Objective      Please see lab results for finger stick glucose results  Pre treatment Vital Signs       Temp: 98.8 °F (37.1 °C)     Pulse: 80     Respirations: 16     BP: 138/74     Post treatment Vital Signs  Temp: 97.2 °F (36.2 °C)  Pulse: 76  Respirations: 18  BP: (!) 159/74  Assessment      Physical Exam:  General Appearance:  alert and oriented to person, place and time, well-developed and well-nourished, in no acute distress  ENT:  tympanic membranes intact bilaterally  Pulmonary/Chest:  clear to auscultation bilaterally- no wheezes, rales or rhonchi, normal air movement, no respiratory distress  Cardiovascular:  regular rate and rhythm  Chamber #: [id number]  Treatment Start Time: 1000     Pressure Reached Time: 1010  ALEXUS : 2  Number of Air Breaks:  Treatment Status: No Air break     Decompression Time: 1140   Treatment End Time: 1150  Length of Treatment: 90 Minutes  Symptoms Noted During Treatment: None  Total Treatment Time (min): 110    Adverse Event: no    I was present on these premises and immediately available to furnish assistance & direction throughout the procedure.   Plan      Chris

## 2024-03-26 NOTE — DISCHARGE INSTRUCTIONS
Discharge Instructions for  Hyperbaric Oxygen Therapy  [facility]  [address]  [place], [place] [address]  Telephone: [phone]     FAX [phone]    NAME:  Chris Stark  YOB: 1955  MEDICAL RECORD NUMBER:  [id number]  DATE:  3/26/2024    You have been treated with 100% oxygen in the Hyperbaric Chamber at the [facility].  There are usually no adverse effects or problems which follow this treatment.  However, for comfort and safety, we strongly recommend these guidelines are followed:    It is perfectly normal to feel tired after a hyperbaric treatment.  This tiredness should go away 2 to 3 days after your last treatment.  Avoid heavy exertion, exercise or other unnecessary activity if you are feeling tired.   Some people develop a feeling of fullness or stuffiness in their ears.  This is a result of a small amount of fluid build-up in the middle ear.  You may take an over the counter decongestant if approved by your doctor.  Follow the instructions in the medicine package for the amount to take.  If this problem lasts for more than 48 hours, please call your personal doctor.  You also may call or return to the [facility] Hyperbaric Medicine Department and have a Hyperbaric physician examine you.  In rare cases, patients may have so called “late effects” after the treatments.  Please call the Hyperbaric Medicine Department if you have any of these symptoms:     [x]Headache that is not relieved by over the counter pain medicine.  [x]Changes in vision.  During the course of many hyperbaric treatments (20 or more) some patients may complain of a temporary problem with sharp focus on distant objects.  This is a result of changes in the shape of the lens.  Your vision should return to normal in 6 to 8 weeks.  [x]Severe pain in your ears.  [x]Nausea or vomiting.  [x]Difficulty concentrating.  [x]Clumsiness, difficulty

## 2024-03-27 ENCOUNTER — HOSPITAL ENCOUNTER (OUTPATIENT)
Dept: HYPERBARIC MEDICINE | Age: 69
Discharge: HOME | End: 2024-03-27
Payer: COMMERCIAL

## 2024-03-27 ENCOUNTER — HOSPITAL ENCOUNTER (OUTPATIENT)
Dept: WOUND CARE | Age: 69
Discharge: HOME | End: 2024-03-27
Attending: SURGERY
Payer: COMMERCIAL

## 2024-03-27 ENCOUNTER — TELEPHONE (OUTPATIENT)
Dept: VASCULAR SURGERY | Age: 69
End: 2024-03-27

## 2024-03-27 VITALS
HEART RATE: 76 BPM | DIASTOLIC BLOOD PRESSURE: 60 MMHG | SYSTOLIC BLOOD PRESSURE: 150 MMHG | TEMPERATURE: 96.5 F | RESPIRATION RATE: 18 BRPM

## 2024-03-27 VITALS
TEMPERATURE: 97.3 F | RESPIRATION RATE: 18 BRPM | SYSTOLIC BLOOD PRESSURE: 141 MMHG | HEART RATE: 79 BPM | DIASTOLIC BLOOD PRESSURE: 63 MMHG

## 2024-03-27 DIAGNOSIS — I70.243 ATHEROSCLEROSIS OF NATIVE ARTERY OF LEFT LEG WITH ULCERATION OF ANKLE (HCC): ICD-10-CM

## 2024-03-27 DIAGNOSIS — E11.622 DIABETIC ULCER OF LEFT CALF ASSOCIATED WITH TYPE 2 DIABETES MELLITUS, WITH NECROSIS OF BONE (HCC): ICD-10-CM

## 2024-03-27 DIAGNOSIS — M86.462 CHRONIC OSTEOMYELITIS OF LEFT FIBULA WITH DRAINING SINUS (HCC): ICD-10-CM

## 2024-03-27 DIAGNOSIS — L97.224 DIABETIC ULCER OF LEFT CALF ASSOCIATED WITH TYPE 2 DIABETES MELLITUS, WITH NECROSIS OF BONE (HCC): ICD-10-CM

## 2024-03-27 DIAGNOSIS — M86.462 CHRONIC OSTEOMYELITIS OF LEFT FIBULA WITH DRAINING SINUS (HCC): Primary | ICD-10-CM

## 2024-03-27 DIAGNOSIS — E11.622 DIABETIC ULCER OF LEFT CALF ASSOCIATED WITH TYPE 2 DIABETES MELLITUS, WITH NECROSIS OF BONE (HCC): Primary | ICD-10-CM

## 2024-03-27 DIAGNOSIS — L97.224 DIABETIC ULCER OF LEFT CALF ASSOCIATED WITH TYPE 2 DIABETES MELLITUS, WITH NECROSIS OF BONE (HCC): Primary | ICD-10-CM

## 2024-03-27 LAB
GLUCOSE BLD-MCNC: 116 MG/DL (ref 74–99)
GLUCOSE BLD-MCNC: 121 MG/DL (ref 74–99)
GLUCOSE BLD-MCNC: 96 MG/DL (ref 74–99)

## 2024-03-27 PROCEDURE — 11046 DBRDMT MUSC&/FSCA EA ADDL: CPT

## 2024-03-27 PROCEDURE — 11046 DBRDMT MUSC&/FSCA EA ADDL: CPT | Performed by: SURGERY

## 2024-03-27 PROCEDURE — G0277 HBOT, FULL BODY CHAMBER, 30M: HCPCS

## 2024-03-27 PROCEDURE — 11043 DBRDMT MUSC&/FSCA 1ST 20/<: CPT

## 2024-03-27 PROCEDURE — 99183 HYPERBARIC OXYGEN THERAPY: CPT | Performed by: SURGERY

## 2024-03-27 PROCEDURE — 82962 GLUCOSE BLOOD TEST: CPT

## 2024-03-27 PROCEDURE — 11043 DBRDMT MUSC&/FSCA 1ST 20/<: CPT | Performed by: SURGERY

## 2024-03-27 RX ORDER — SODIUM CHLOR/HYPOCHLOROUS ACID 0.033 %
SOLUTION, IRRIGATION IRRIGATION ONCE
OUTPATIENT
Start: 2024-03-27 | End: 2024-03-27

## 2024-03-27 RX ORDER — GENTAMICIN SULFATE 1 MG/G
OINTMENT TOPICAL ONCE
OUTPATIENT
Start: 2024-03-27 | End: 2024-03-27

## 2024-03-27 RX ORDER — CLOBETASOL PROPIONATE 0.5 MG/G
OINTMENT TOPICAL ONCE
OUTPATIENT
Start: 2024-03-27 | End: 2024-03-27

## 2024-03-27 RX ORDER — LIDOCAINE 40 MG/G
CREAM TOPICAL ONCE
OUTPATIENT
Start: 2024-03-27 | End: 2024-03-27

## 2024-03-27 RX ORDER — LIDOCAINE 50 MG/G
OINTMENT TOPICAL ONCE
OUTPATIENT
Start: 2024-03-27 | End: 2024-03-27

## 2024-03-27 RX ORDER — TRIAMCINOLONE ACETONIDE 1 MG/G
OINTMENT TOPICAL ONCE
OUTPATIENT
Start: 2024-03-27 | End: 2024-03-27

## 2024-03-27 RX ORDER — LIDOCAINE HYDROCHLORIDE 40 MG/ML
SOLUTION TOPICAL ONCE
Status: COMPLETED | OUTPATIENT
Start: 2024-03-27 | End: 2024-03-27

## 2024-03-27 RX ORDER — LIDOCAINE HYDROCHLORIDE 20 MG/ML
JELLY TOPICAL ONCE
OUTPATIENT
Start: 2024-03-27 | End: 2024-03-27

## 2024-03-27 RX ORDER — LIDOCAINE HYDROCHLORIDE 40 MG/ML
SOLUTION TOPICAL ONCE
OUTPATIENT
Start: 2024-03-27 | End: 2024-03-27

## 2024-03-27 RX ORDER — BACITRACIN ZINC 500 [USP'U]/G
OINTMENT TOPICAL ONCE
OUTPATIENT
Start: 2024-03-27 | End: 2024-03-27

## 2024-03-27 RX ORDER — BACITRACIN ZINC AND POLYMYXIN B SULFATE 500; 1000 [USP'U]/G; [USP'U]/G
OINTMENT TOPICAL ONCE
OUTPATIENT
Start: 2024-03-27 | End: 2024-03-27

## 2024-03-27 RX ORDER — IBUPROFEN 200 MG
TABLET ORAL ONCE
OUTPATIENT
Start: 2024-03-27 | End: 2024-03-27

## 2024-03-27 RX ORDER — BETAMETHASONE DIPROPIONATE 0.5 MG/G
CREAM TOPICAL ONCE
OUTPATIENT
Start: 2024-03-27 | End: 2024-03-27

## 2024-03-27 RX ADMIN — LIDOCAINE HYDROCHLORIDE 5 ML: 40 SOLUTION TOPICAL at 13:35

## 2024-03-27 NOTE — PROGRESS NOTES
_______________________  (Please note your next appointment above and if you are unable to keep, kindly give a 24 hour notice. Thank you.)     Physician signature:__________________________      If you experience any of the following, please call the Wound Care Center during business hours:     * Increase in Pain  * Temperature over 101  * Increase in drainage from your wound  * Drainage with a foul odor  * Bleeding  * Increase in swelling  * Need for compression bandage changes due to slippage, breakthrough drainage.     If you need medical attention outside of the business hours of the Wound Care Centers please contact your PCP or go to the nearest emergency room.                  Electronically signed by Sydnee Baca MD

## 2024-03-27 NOTE — PROGRESS NOTES
[facility]  Hyperbaric Oxygen Therapy   Progress Note    NAME: Chris Stark  MEDICAL RECORD NUMBER:  [id number]  AGE: 68 y.o.   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  See doc flowsheets for documentation.  Objective      Please see lab results for finger stick glucose results  Pre treatment Vital Signs       Temp: 98.1 °F (36.7 °C)     Pulse: 79     Respirations: 16     BP: (!) 140/70     Post treatment Vital Signs  Temp: (!) 96.5 °F (35.8 °C)  Pulse: 76  Respirations: 18  BP: (!) 150/60  Assessment      Physical Exam:  General Appearance:  alert and oriented to person, place and time, well-developed and well-nourished, in no acute distress  ENT:  tympanic membranes intact bilaterally  Pulmonary/Chest:  clear to auscultation bilaterally- no wheezes, rales or rhonchi, normal air movement, no respiratory distress  Cardiovascular:  S1S2     Treatment Start Time: 0950     Pressure Reached Time: 1000  ALEXUS : 2  Number of Air Breaks:  Treatment Status: Air break X 2 for 5 minutes each     Decompression Time: 1130   Treatment End Time: 1140  Length of Treatment: 90 Minutes  Symptoms Noted During Treatment: None  Total Treatment Time (min): 110    Adverse Event: no    I was present on these premises and immediately available to furnish assistance & direction throughout the procedure.   Plan      Chris

## 2024-03-27 NOTE — DISCHARGE INSTRUCTIONS
Discharge Instructions for  Hyperbaric Oxygen Therapy  [facility]  [address]  [place], [place] [address]  Telephone: [phone]     FAX [phone]    NAME:  Chris Stark  YOB: 1955  MEDICAL RECORD NUMBER:  [id number]  DATE:  3/27/2024    You have been treated with 100% oxygen in the Hyperbaric Chamber at the [facility].  There are usually no adverse effects or problems which follow this treatment.  However, for comfort and safety, we strongly recommend these guidelines are followed:    It is perfectly normal to feel tired after a hyperbaric treatment.  This tiredness should go away 2 to 3 days after your last treatment.  Avoid heavy exertion, exercise or other unnecessary activity if you are feeling tired.   Some people develop a feeling of fullness or stuffiness in their ears.  This is a result of a small amount of fluid build-up in the middle ear.  You may take an over the counter decongestant if approved by your doctor.  Follow the instructions in the medicine package for the amount to take.  If this problem lasts for more than 48 hours, please call your personal doctor.  You also may call or return to the [facility] Hyperbaric Medicine Department and have a Hyperbaric physician examine you.  In rare cases, patients may have so called “late effects” after the treatments.  Please call the Hyperbaric Medicine Department if you have any of these symptoms:     [x]Headache that is not relieved by over the counter pain medicine.  [x]Changes in vision.  During the course of many hyperbaric treatments (20 or more) some patients may complain of a temporary problem with sharp focus on distant objects.  This is a result of changes in the shape of the lens.  Your vision should return to normal in 6 to 8 weeks.  [x]Severe pain in your ears.  [x]Nausea or vomiting.  [x]Difficulty concentrating.  [x]Clumsiness, difficulty

## 2024-03-27 NOTE — TELEPHONE ENCOUNTER
Spoke with the pt, rescheduled 4/23/24 arteriogram with Dr. Baca to 4/9/24 at 11:30 a.m.  Pt was notified and instructed to report to [address] floor registration at 9:30 a.m, NPO after midnight, have transportation.

## 2024-03-28 ENCOUNTER — HOSPITAL ENCOUNTER (OUTPATIENT)
Dept: HYPERBARIC MEDICINE | Age: 69
Discharge: HOME | End: 2024-03-28
Payer: COMMERCIAL

## 2024-03-28 VITALS
DIASTOLIC BLOOD PRESSURE: 78 MMHG | RESPIRATION RATE: 18 BRPM | TEMPERATURE: 98.1 F | HEART RATE: 72 BPM | SYSTOLIC BLOOD PRESSURE: 142 MMHG

## 2024-03-28 DIAGNOSIS — M86.462 CHRONIC OSTEOMYELITIS OF LEFT FIBULA WITH DRAINING SINUS (HCC): ICD-10-CM

## 2024-03-28 DIAGNOSIS — E11.622 DIABETIC ULCER OF LEFT CALF ASSOCIATED WITH TYPE 2 DIABETES MELLITUS, WITH NECROSIS OF BONE (HCC): Primary | ICD-10-CM

## 2024-03-28 DIAGNOSIS — L97.224 DIABETIC ULCER OF LEFT CALF ASSOCIATED WITH TYPE 2 DIABETES MELLITUS, WITH NECROSIS OF BONE (HCC): Primary | ICD-10-CM

## 2024-03-28 LAB
GLUCOSE BLD-MCNC: 158 MG/DL (ref 74–99)
GLUCOSE BLD-MCNC: 181 MG/DL (ref 74–99)

## 2024-03-28 PROCEDURE — 82962 GLUCOSE BLOOD TEST: CPT

## 2024-03-28 PROCEDURE — G0277 HBOT, FULL BODY CHAMBER, 30M: HCPCS

## 2024-03-28 NOTE — PROGRESS NOTES
Chris Stark is a 68 y.o. male has been receiving hyperbaric oxygen treatment for management of:Indication  Indications: Lower Extremity Diabetic Wound ___(site) (LLE). Mr. Stark has completed Treatment Number: 10 out of a treatment protocol of Total Treatments: 30.    Problem List:  Patient Active Problem List   Diagnosis Code    DM II (diabetes mellitus, type II), controlled ([facility]) E11.9    HTN (hypertension) I10    PVD (peripheral vascular disease) ([facility]) I73.9    Leukocytosis D72.829    CKD (chronic kidney disease) stage 3, GFR 30-59 ml/min ([facility]) N18.30    Tobacco dependence F17.200    HLD (hyperlipidemia) E78.5    History of vascular surgery Z98.890    Above knee amputation of right lower extremity ([facility]) S78.111A    Postoperative pain G89.18    Chronic pain syndrome G89.4    Elevated sedimentation rate R70.0    Major depressive disorder, single episode, unspecified F32.9    Muscle weakness (generalized) M62.81    Obstructive sleep apnea (adult) (pediatric) G47.33    Benign hypertensive kidney disease with chronic kidney disease I12.9    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    Secondary hyperparathyroidism of renal origin ([facility]) N25.81    Vitamin D deficiency E55.9    Open wound of left lower leg S81.802A    Osteomyelitis of left fibula ([facility]) M86.9    Atherosclerosis of native artery of left leg with ulceration of ankle ([facility]) I70.243    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    Pt is tolerating hyperbaric oxygen therapy  well without complications.         New Yu RN  3/28/2024  4:14 PM

## 2024-03-28 NOTE — DISCHARGE INSTRUCTIONS
Discharge Instructions for  Hyperbaric Oxygen Therapy  [facility]  [address]  [place], [place] [address]  Telephone: [phone]     FAX [phone]    NAME:  Chris Stark  YOB: 1955  MEDICAL RECORD NUMBER:  [id number]  DATE:  3/28/2024    You have been treated with 100% oxygen in the Hyperbaric Chamber at the [facility].  There are usually no adverse effects or problems which follow this treatment.  However, for comfort and safety, we strongly recommend these guidelines are followed:    It is perfectly normal to feel tired after a hyperbaric treatment.  This tiredness should go away 2 to 3 days after your last treatment.  Avoid heavy exertion, exercise or other unnecessary activity if you are feeling tired.   Some people develop a feeling of fullness or stuffiness in their ears.  This is a result of a small amount of fluid build-up in the middle ear.  You may take an over the counter decongestant if approved by your doctor.  Follow the instructions in the medicine package for the amount to take.  If this problem lasts for more than 48 hours, please call your personal doctor.  You also may call or return to the [facility] Hyperbaric Medicine Department and have a Hyperbaric physician examine you.  In rare cases, patients may have so called “late effects” after the treatments.  Please call the Hyperbaric Medicine Department if you have any of these symptoms:     [x]Headache that is not relieved by over the counter pain medicine.  [x]Changes in vision.  During the course of many hyperbaric treatments (20 or more) some patients may complain of a temporary problem with sharp focus on distant objects.  This is a result of changes in the shape of the lens.  Your vision should return to normal in 6 to 8 weeks.  [x]Severe pain in your ears.  [x]Nausea or vomiting.  [x]Difficulty concentrating.  [x]Clumsiness, difficulty

## 2024-04-01 ENCOUNTER — HOSPITAL ENCOUNTER (OUTPATIENT)
Dept: HYPERBARIC MEDICINE | Age: 69
Discharge: HOME | End: 2024-04-01
Payer: MEDICAID

## 2024-04-01 VITALS
RESPIRATION RATE: 16 BRPM | DIASTOLIC BLOOD PRESSURE: 80 MMHG | HEART RATE: 75 BPM | SYSTOLIC BLOOD PRESSURE: 140 MMHG | TEMPERATURE: 97.4 F

## 2024-04-01 DIAGNOSIS — E11.622 DIABETIC ULCER OF LEFT CALF ASSOCIATED WITH TYPE 2 DIABETES MELLITUS, WITH NECROSIS OF BONE (HCC): Primary | ICD-10-CM

## 2024-04-01 DIAGNOSIS — L97.224 DIABETIC ULCER OF LEFT CALF ASSOCIATED WITH TYPE 2 DIABETES MELLITUS, WITH NECROSIS OF BONE (HCC): Primary | ICD-10-CM

## 2024-04-01 DIAGNOSIS — M86.462 CHRONIC OSTEOMYELITIS OF LEFT FIBULA WITH DRAINING SINUS (HCC): ICD-10-CM

## 2024-04-01 LAB
GLUCOSE BLD-MCNC: 141 MG/DL (ref 74–99)
GLUCOSE BLD-MCNC: 176 MG/DL (ref 74–99)

## 2024-04-01 PROCEDURE — G0277 HBOT, FULL BODY CHAMBER, 30M: HCPCS

## 2024-04-01 PROCEDURE — 99183 HYPERBARIC OXYGEN THERAPY: CPT | Performed by: SURGERY

## 2024-04-01 PROCEDURE — 82962 GLUCOSE BLOOD TEST: CPT

## 2024-04-01 NOTE — DISCHARGE INSTRUCTIONS
Discharge Instructions for  Hyperbaric Oxygen Therapy  [facility]  [address]  [place], [place] [address]  Telephone: [phone]     FAX [phone]    NAME:  Chris Stark  YOB: 1955  MEDICAL RECORD NUMBER:  [id number]  DATE:  4/1/2024    You have been treated with 100% oxygen in the Hyperbaric Chamber at the [facility].  There are usually no adverse effects or problems which follow this treatment.  However, for comfort and safety, we strongly recommend these guidelines are followed:    It is perfectly normal to feel tired after a hyperbaric treatment.  This tiredness should go away 2 to 3 days after your last treatment.  Avoid heavy exertion, exercise or other unnecessary activity if you are feeling tired.   Some people develop a feeling of fullness or stuffiness in their ears.  This is a result of a small amount of fluid build-up in the middle ear.  You may take an over the counter decongestant if approved by your doctor.  Follow the instructions in the medicine package for the amount to take.  If this problem lasts for more than 48 hours, please call your personal doctor.  You also may call or return to the [facility] Hyperbaric Medicine Department and have a Hyperbaric physician examine you.  In rare cases, patients may have so called “late effects” after the treatments.  Please call the Hyperbaric Medicine Department if you have any of these symptoms:     [x]Headache that is not relieved by over the counter pain medicine.  [x]Changes in vision.  During the course of many hyperbaric treatments (20 or more) some patients may complain of a temporary problem with sharp focus on distant objects.  This is a result of changes in the shape of the lens.  Your vision should return to normal in 6 to 8 weeks.  [x]Severe pain in your ears.  [x]Nausea or vomiting.  [x]Difficulty concentrating.  [x]Clumsiness, difficulty

## 2024-04-01 NOTE — PROGRESS NOTES
Discharge instructions were reviewed with the patient and printed instructions were declined  
  Chris Stark is a 68 y.o. male  did successfully complete today's hyperbaric oxygen treatment at [facility].    In my clinical judgement, ongoing HBO therapy is  necessary at this time, given a threat to patient function, limb or life from the current condition.      Supervision and attendance of Hyperbaric Oxygen Therapy provided.  Continue HBO treatment as outlined in the treatment plan.    Hyperbaric Oxygen: Chris Stark tolerated Treatment Number: 11 well today without complications.    Discharge Instructions were explained and given to Mr. Stark     Electronically signed by Wes Pretty MD on 4/1/2024 at 4:08 PM

## 2024-04-01 NOTE — DISCHARGE INSTRUCTIONS
Visit Discharge/Physician Orders     Discharge condition: Stable     Assessment of pain at discharge: yes     Anesthetic used: 4% lidocaine      Discharge to: Home     Left via:Private automobile     Accompanied by: family     ECF/HHA: [facility] *New order*     Dressing Orders: LEFT LATERAL ANKLE: Cleanse wound with normal saline. COVER WITH DRAWTEX. APPLY Profore Change M-W (in [facility])-F.       IF WRAP FALLS 2 INCHES, OR IF PAIN INCREASES AND BECOMES INTOLERABLE,  REMOVE WRAP AND APPLY DOUBLE TUBIGRIP.  CALL WOUND CARE CENTER.       Treatment Orders: Eat a diet high in protein and vitamin C. Take a multiple vitamin daily unless contraindicated.      Elevate leg as much as possible.     Follow up with Dr. Brooks for foot care     STOP SMOKING!      Angiogram in future- Dr. KAMINSKI office will call and schedule.      [facility] followup visit : 1 week(in pm) _______________________  (Please note your next appointment above and if you are unable to keep, kindly give a 24 hour notice. Thank you.)     Physician signature:__________________________      If you experience any of the following, please call the Wound Care Center during business hours:     * Increase in Pain  * Temperature over 101  * Increase in drainage from your wound  * Drainage with a foul odor  * Bleeding  * Increase in swelling  * Need for compression bandage changes due to slippage, breakthrough drainage.     If you need medical attention outside of the business hours of the Wound Care Centers please contact your PCP or go to the nearest emergency room.

## 2024-04-02 ENCOUNTER — HOSPITAL ENCOUNTER (OUTPATIENT)
Dept: HYPERBARIC MEDICINE | Age: 69
Discharge: HOME | End: 2024-04-02
Payer: MEDICAID

## 2024-04-02 VITALS
SYSTOLIC BLOOD PRESSURE: 178 MMHG | TEMPERATURE: 97.7 F | RESPIRATION RATE: 18 BRPM | HEART RATE: 78 BPM | DIASTOLIC BLOOD PRESSURE: 65 MMHG

## 2024-04-02 DIAGNOSIS — M86.462 CHRONIC OSTEOMYELITIS OF LEFT FIBULA WITH DRAINING SINUS (HCC): ICD-10-CM

## 2024-04-02 DIAGNOSIS — L97.224 DIABETIC ULCER OF LEFT CALF ASSOCIATED WITH TYPE 2 DIABETES MELLITUS, WITH NECROSIS OF BONE (HCC): Primary | ICD-10-CM

## 2024-04-02 DIAGNOSIS — E11.622 DIABETIC ULCER OF LEFT CALF ASSOCIATED WITH TYPE 2 DIABETES MELLITUS, WITH NECROSIS OF BONE (HCC): Primary | ICD-10-CM

## 2024-04-02 LAB
GLUCOSE BLD-MCNC: 131 MG/DL (ref 74–99)
GLUCOSE BLD-MCNC: 167 MG/DL (ref 74–99)

## 2024-04-02 PROCEDURE — 82962 GLUCOSE BLOOD TEST: CPT

## 2024-04-02 PROCEDURE — G0277 HBOT, FULL BODY CHAMBER, 30M: HCPCS

## 2024-04-02 PROCEDURE — 99183 HYPERBARIC OXYGEN THERAPY: CPT | Performed by: SURGERY

## 2024-04-02 RX ORDER — PEN NEEDLE, DIABETIC 29 G X1/2"
NEEDLE, DISPOSABLE MISCELLANEOUS
Qty: 200 EACH | Refills: 10 | Status: SHIPPED | OUTPATIENT
Start: 2024-04-02

## 2024-04-02 NOTE — PROGRESS NOTES
[facility] [place]  Hyperbaric Oxygen Therapy   Progress Note    NAME: Chris Stark  MEDICAL RECORD NUMBER:  [id number]  AGE: 68 y.o.   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  See doc flowsheets for documentation.  Objective      Please see lab results for finger stick glucose results  Pre treatment Vital Signs       Temp: 98.6 °F (37 °C)     Pulse: 76     Respirations: 18     BP: (!) 151/76     Post treatment Vital Signs  Temp: 97.7 °F (36.5 °C)  Pulse: 78  Respirations: 18  BP: (!) 178/65  Assessment      Physical Exam:  General Appearance:  alert and oriented to person, place and time, well-developed and well-nourished, in no acute distress  ENT:  tympanic membranes intact bilaterally  Pulmonary/Chest:  clear to auscultation bilaterally- no wheezes, rales or rhonchi, normal air movement, no respiratory distress  Cardiovascular:  regular rate and rhythm  Chamber #: [id number]  Treatment Start Time: 1004     Pressure Reached Time: 1014  ALEXUS : 2  Number of Air Breaks:  Treatment Status: No Air break     Decompression Time: 1144   Treatment End Time: 1154  Length of Treatment: 90 Minutes  Symptoms Noted During Treatment: None  Total Treatment Time (min): 110    Adverse Event: no    I was present on these premises and immediately available to furnish assistance & direction throughout the procedure.   Plan      Chris

## 2024-04-02 NOTE — DISCHARGE INSTRUCTIONS
Discharge Instructions for  Hyperbaric Oxygen Therapy  [facility]  [address]  [place], [place] [address]  Telephone: [phone]     FAX [phone]    NAME:  Chris Stark  YOB: 1955  MEDICAL RECORD NUMBER:  [id number]  DATE:  4/2/2024    You have been treated with 100% oxygen in the Hyperbaric Chamber at the [facility].  There are usually no adverse effects or problems which follow this treatment.  However, for comfort and safety, we strongly recommend these guidelines are followed:    It is perfectly normal to feel tired after a hyperbaric treatment.  This tiredness should go away 2 to 3 days after your last treatment.  Avoid heavy exertion, exercise or other unnecessary activity if you are feeling tired.   Some people develop a feeling of fullness or stuffiness in their ears.  This is a result of a small amount of fluid build-up in the middle ear.  You may take an over the counter decongestant if approved by your doctor.  Follow the instructions in the medicine package for the amount to take.  If this problem lasts for more than 48 hours, please call your personal doctor.  You also may call or return to the [facility] Hyperbaric Medicine Department and have a Hyperbaric physician examine you.  In rare cases, patients may have so called “late effects” after the treatments.  Please call the Hyperbaric Medicine Department if you have any of these symptoms:     [x]Headache that is not relieved by over the counter pain medicine.  [x]Changes in vision.  During the course of many hyperbaric treatments (20 or more) some patients may complain of a temporary problem with sharp focus on distant objects.  This is a result of changes in the shape of the lens.  Your vision should return to normal in 6 to 8 weeks.  [x]Severe pain in your ears.  [x]Nausea or vomiting.  [x]Difficulty concentrating.  [x]Clumsiness, difficulty

## 2024-04-03 ENCOUNTER — HOSPITAL ENCOUNTER (OUTPATIENT)
Dept: WOUND CARE | Age: 69
Discharge: HOME | End: 2024-04-03
Attending: SURGERY

## 2024-04-04 ENCOUNTER — HOSPITAL ENCOUNTER (OUTPATIENT)
Dept: HYPERBARIC MEDICINE | Age: 69
Discharge: HOME | End: 2024-04-04
Payer: MEDICAID

## 2024-04-04 VITALS
HEART RATE: 67 BPM | RESPIRATION RATE: 17 BRPM | SYSTOLIC BLOOD PRESSURE: 149 MMHG | TEMPERATURE: 97.5 F | DIASTOLIC BLOOD PRESSURE: 74 MMHG

## 2024-04-04 DIAGNOSIS — M86.462 CHRONIC OSTEOMYELITIS OF LEFT FIBULA WITH DRAINING SINUS (HCC): ICD-10-CM

## 2024-04-04 DIAGNOSIS — L97.224 DIABETIC ULCER OF LEFT CALF ASSOCIATED WITH TYPE 2 DIABETES MELLITUS, WITH NECROSIS OF BONE (HCC): Primary | ICD-10-CM

## 2024-04-04 DIAGNOSIS — E11.622 DIABETIC ULCER OF LEFT CALF ASSOCIATED WITH TYPE 2 DIABETES MELLITUS, WITH NECROSIS OF BONE (HCC): Primary | ICD-10-CM

## 2024-04-04 LAB
GLUCOSE BLD-MCNC: 114 MG/DL (ref 74–99)
GLUCOSE BLD-MCNC: 172 MG/DL (ref 74–99)

## 2024-04-04 PROCEDURE — 82962 GLUCOSE BLOOD TEST: CPT

## 2024-04-04 PROCEDURE — G0277 HBOT, FULL BODY CHAMBER, 30M: HCPCS

## 2024-04-04 NOTE — DISCHARGE INSTRUCTIONS
Discharge Instructions for  Hyperbaric Oxygen Therapy  [facility]  [address]  [place], [place] [address]  Telephone: [phone]     FAX [phone]    NAME:  Chris Stark  YOB: 1955  MEDICAL RECORD NUMBER:  [id number]  DATE:  4/4/2024    You have been treated with 100% oxygen in the Hyperbaric Chamber at the [facility].  There are usually no adverse effects or problems which follow this treatment.  However, for comfort and safety, we strongly recommend these guidelines are followed:    It is perfectly normal to feel tired after a hyperbaric treatment.  This tiredness should go away 2 to 3 days after your last treatment.  Avoid heavy exertion, exercise or other unnecessary activity if you are feeling tired.   Some people develop a feeling of fullness or stuffiness in their ears.  This is a result of a small amount of fluid build-up in the middle ear.  You may take an over the counter decongestant if approved by your doctor.  Follow the instructions in the medicine package for the amount to take.  If this problem lasts for more than 48 hours, please call your personal doctor.  You also may call or return to the [facility] Hyperbaric Medicine Department and have a Hyperbaric physician examine you.  In rare cases, patients may have so called “late effects” after the treatments.  Please call the Hyperbaric Medicine Department if you have any of these symptoms:     [x]Headache that is not relieved by over the counter pain medicine.  [x]Changes in vision.  During the course of many hyperbaric treatments (20 or more) some patients may complain of a temporary problem with sharp focus on distant objects.  This is a result of changes in the shape of the lens.  Your vision should return to normal in 6 to 8 weeks.  [x]Severe pain in your ears.  [x]Nausea or vomiting.  [x]Difficulty concentrating.  [x]Clumsiness, difficulty

## 2024-04-04 NOTE — PROGRESS NOTES
Daily HBO AVS discharge instructions reviewed with patient. Patient verbalizes understanding, declines printed copy today.

## 2024-04-04 NOTE — DISCHARGE INSTRUCTIONS
Visit Discharge/Physician Orders     Discharge condition: Stable     Assessment of pain at discharge: yes     Anesthetic used: 4% lidocaine      Discharge to: Home     Left via:Private automobile     Accompanied by: family     ECF/HHA: [facility]      Dressing Orders: LEFT LATERAL ANKLE: Cleanse wound with normal saline. COVER WITH DRAWTEX. APPLY Profore Change M-W (in [facility])-F.       IF WRAP FALLS 2 INCHES, OR IF PAIN INCREASES AND BECOMES INTOLERABLE,  REMOVE WRAP AND APPLY DOUBLE TUBIGRIP.  CALL WOUND CARE CENTER.       Treatment Orders: Eat a diet high in protein and vitamin C. Take a multiple vitamin daily unless contraindicated.      Elevate leg as much as possible.     Follow up with Dr. Brooks for foot care     STOP SMOKING!      Angiogram in future- Dr. KAMINSKI office will call and schedule.      [facility] followup visit : 1 week(in pm) _______________________  (Please note your next appointment above and if you are unable to keep, kindly give a 24 hour notice. Thank you.)     Physician signature:__________________________      If you experience any of the following, please call the Wound Care Center during business hours:     * Increase in Pain  * Temperature over 101  * Increase in drainage from your wound  * Drainage with a foul odor  * Bleeding  * Increase in swelling  * Need for compression bandage changes due to slippage, breakthrough drainage.     If you need medical attention outside of the business hours of the Wound Care Centers please contact your PCP or go to the nearest emergency room.

## 2024-04-08 ENCOUNTER — HOSPITAL ENCOUNTER (OUTPATIENT)
Dept: HYPERBARIC MEDICINE | Age: 69
Discharge: HOME | End: 2024-04-08
Payer: MEDICARE

## 2024-04-08 VITALS
TEMPERATURE: 96.9 F | DIASTOLIC BLOOD PRESSURE: 60 MMHG | SYSTOLIC BLOOD PRESSURE: 162 MMHG | RESPIRATION RATE: 16 BRPM | HEART RATE: 72 BPM

## 2024-04-08 DIAGNOSIS — E11.622 DIABETIC ULCER OF LEFT CALF ASSOCIATED WITH TYPE 2 DIABETES MELLITUS, WITH NECROSIS OF BONE (HCC): Primary | ICD-10-CM

## 2024-04-08 DIAGNOSIS — L97.224 DIABETIC ULCER OF LEFT CALF ASSOCIATED WITH TYPE 2 DIABETES MELLITUS, WITH NECROSIS OF BONE (HCC): Primary | ICD-10-CM

## 2024-04-08 DIAGNOSIS — M86.462 CHRONIC OSTEOMYELITIS OF LEFT FIBULA WITH DRAINING SINUS (HCC): ICD-10-CM

## 2024-04-08 LAB
GLUCOSE BLD-MCNC: 182 MG/DL (ref 74–99)
GLUCOSE BLD-MCNC: 199 MG/DL (ref 74–99)

## 2024-04-08 PROCEDURE — G0277 HBOT, FULL BODY CHAMBER, 30M: HCPCS

## 2024-04-08 PROCEDURE — 82962 GLUCOSE BLOOD TEST: CPT

## 2024-04-08 PROCEDURE — 99183 HYPERBARIC OXYGEN THERAPY: CPT | Performed by: SURGERY

## 2024-04-08 NOTE — DISCHARGE INSTRUCTIONS
Discharge Instructions for  Hyperbaric Oxygen Therapy  [facility]  [address]  [place], [place] [address]  Telephone: [phone]     FAX [phone]    NAME:  Chris Stark  YOB: 1955  MEDICAL RECORD NUMBER:  [id number]  DATE:  4/8/2024    You have been treated with 100% oxygen in the Hyperbaric Chamber at the [facility].  There are usually no adverse effects or problems which follow this treatment.  However, for comfort and safety, we strongly recommend these guidelines are followed:    It is perfectly normal to feel tired after a hyperbaric treatment.  This tiredness should go away 2 to 3 days after your last treatment.  Avoid heavy exertion, exercise or other unnecessary activity if you are feeling tired.   Some people develop a feeling of fullness or stuffiness in their ears.  This is a result of a small amount of fluid build-up in the middle ear.  You may take an over the counter decongestant if approved by your doctor.  Follow the instructions in the medicine package for the amount to take.  If this problem lasts for more than 48 hours, please call your personal doctor.  You also may call or return to the [facility] Hyperbaric Medicine Department and have a Hyperbaric physician examine you.  In rare cases, patients may have so called “late effects” after the treatments.  Please call the Hyperbaric Medicine Department if you have any of these symptoms:     [x]Headache that is not relieved by over the counter pain medicine.  [x]Changes in vision.  During the course of many hyperbaric treatments (20 or more) some patients may complain of a temporary problem with sharp focus on distant objects.  This is a result of changes in the shape of the lens.  Your vision should return to normal in 6 to 8 weeks.  [x]Severe pain in your ears.  [x]Nausea or vomiting.  [x]Difficulty concentrating.  [x]Clumsiness, difficulty

## 2024-04-08 NOTE — PROGRESS NOTES
Chris Stark is a 68 y.o. male has been receiving hyperbaric oxygen treatment for management of:Indication  Indications: Lower Extremity Diabetic Wound ___(site) (LLE). Mr. Stark has completed Treatment Number: 13 out of a treatment protocol of Total Treatments: 30.    Problem List:  Patient Active Problem List   Diagnosis Code    DM II (diabetes mellitus, type II), controlled ([facility]) E11.9    HTN (hypertension) I10    PVD (peripheral vascular disease) ([facility]) I73.9    Leukocytosis D72.829    CKD (chronic kidney disease) stage 3, GFR 30-59 ml/min ([facility]) N18.30    Tobacco dependence F17.200    HLD (hyperlipidemia) E78.5    History of vascular surgery Z98.890    Above knee amputation of right lower extremity ([facility]) S78.111A    Postoperative pain G89.18    Chronic pain syndrome G89.4    Elevated sedimentation rate R70.0    Major depressive disorder, single episode, unspecified F32.9    Muscle weakness (generalized) M62.81    Obstructive sleep apnea (adult) (pediatric) G47.33    Benign hypertensive kidney disease with chronic kidney disease I12.9    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    Secondary hyperparathyroidism of renal origin ([facility]) N25.81    Vitamin D deficiency E55.9    Open wound of left lower leg S81.802A    Osteomyelitis of left fibula ([facility]) M86.9    Atherosclerosis of native artery of left leg with ulceration of ankle ([facility]) I70.243    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    Pt is tolerating hyperbaric oxygen therapy  well without complications.         New Yu RN  4/8/2024  11:53 AM

## 2024-04-08 NOTE — PROGRESS NOTES
[facility]  Hyperbaric Oxygen Therapy   Progress Note    NAME: Chris Stark  MEDICAL RECORD NUMBER:  [id number]  AGE: 68 y.o.   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  See doc flowsheets for documentation.  Objective      Please see lab results for finger stick glucose results  Pre treatment Vital Signs       Temp: 96.9 °F (36.1 °C)     Pulse: 72     Respirations: 16     BP: (!) 162/60     Post treatment Vital Signs  Temp: 96.9 °F (36.1 °C)  Pulse: 72  Respirations: 16  BP: (!) 162/60  Assessment      Physical Exam:  General Appearance:  alert and oriented to person, place and time, well-developed and well-nourished, in no acute distress  ENT:  tympanic membranes intact bilaterally  Pulmonary/Chest:  clear to auscultation bilaterally- no wheezes, rales or rhonchi, normal air movement, no respiratory distress  Cardiovascular:  regular rate and rhythm  Chamber #: [id number]  Treatment Start Time: 0955     Pressure Reached Time: 1005  ALEXUS : 2  Number of Air Breaks:  Treatment Status: No Air break     Decompression Time: 1135   Treatment End Time: 1142  Length of Treatment: 90 Minutes  Symptoms Noted During Treatment: None  Total Treatment Time (min): 107    Adverse Event: no    I was present on these premises and immediately available to furnish assistance & direction throughout the procedure.   Plan

## 2024-04-09 ENCOUNTER — HOSPITAL ENCOUNTER (OUTPATIENT)
Age: 69
Discharge: HOME | End: 2024-04-09
Attending: SURGERY | Admitting: SURGERY
Payer: MEDICARE

## 2024-04-09 VITALS
RESPIRATION RATE: 18 BRPM | WEIGHT: 250 LBS | TEMPERATURE: 97.5 F | BODY MASS INDEX: 32.08 KG/M2 | DIASTOLIC BLOOD PRESSURE: 65 MMHG | HEART RATE: 71 BPM | HEIGHT: 74 IN | OXYGEN SATURATION: 97 % | SYSTOLIC BLOOD PRESSURE: 164 MMHG

## 2024-04-09 DIAGNOSIS — G89.18 POSTOPERATIVE PAIN: ICD-10-CM

## 2024-04-09 DIAGNOSIS — I73.9 PERIPHERAL VASCULAR DISEASE, UNSPECIFIED (HCC): ICD-10-CM

## 2024-04-09 DIAGNOSIS — G89.4 CHRONIC PAIN SYNDROME: Primary | ICD-10-CM

## 2024-04-09 LAB
ANION GAP SERPL CALCULATED.3IONS-SCNC: 10 MMOL/L (ref 7–16)
ANION GAP SERPL CALCULATED.3IONS-SCNC: 10 MMOL/L (ref 7–16)
BUN SERPL-MCNC: 26 MG/DL (ref 6–23)
BUN SERPL-MCNC: 27 MG/DL (ref 6–23)
CALCIUM SERPL-MCNC: 8.9 MG/DL (ref 8.6–10.2)
CALCIUM SERPL-MCNC: 9.3 MG/DL (ref 8.6–10.2)
CHLORIDE SERPL-SCNC: 106 MMOL/L (ref 98–107)
CHLORIDE SERPL-SCNC: 106 MMOL/L (ref 98–107)
CO2 SERPL-SCNC: 24 MMOL/L (ref 22–29)
CO2 SERPL-SCNC: 25 MMOL/L (ref 22–29)
CREAT SERPL-MCNC: 2 MG/DL (ref 0.7–1.2)
CREAT SERPL-MCNC: 2 MG/DL (ref 0.7–1.2)
ERYTHROCYTE [DISTWIDTH] IN BLOOD BY AUTOMATED COUNT: 19.5 % (ref 11.5–15)
GFR SERPL CREATININE-BSD FRML MDRD: 37 ML/MIN/1.73M2
GFR SERPL CREATININE-BSD FRML MDRD: 37 ML/MIN/1.73M2
GLUCOSE SERPL-MCNC: 70 MG/DL (ref 74–99)
GLUCOSE SERPL-MCNC: 72 MG/DL (ref 74–99)
HCT VFR BLD AUTO: 34.6 % (ref 37–54)
HGB BLD-MCNC: 10.6 G/DL (ref 12.5–16.5)
MCH RBC QN AUTO: 24.8 PG (ref 26–35)
MCHC RBC AUTO-ENTMCNC: 30.6 G/DL (ref 32–34.5)
MCV RBC AUTO: 81 FL (ref 80–99.9)
PLATELET # BLD AUTO: 259 K/UL (ref 130–450)
PMV BLD AUTO: 10.7 FL (ref 7–12)
POTASSIUM SERPL-SCNC: 4.2 MMOL/L (ref 3.5–5)
POTASSIUM SERPL-SCNC: 5.8 MMOL/L (ref 3.5–5)
RBC # BLD AUTO: 4.27 M/UL (ref 3.8–5.8)
SODIUM SERPL-SCNC: 140 MMOL/L (ref 132–146)
SODIUM SERPL-SCNC: 141 MMOL/L (ref 132–146)
WBC OTHER # BLD: 10.9 K/UL (ref 4.5–11.5)

## 2024-04-09 PROCEDURE — 37228 HC TIB PER TERRITORY PLASTY: CPT | Performed by: SURGERY

## 2024-04-09 PROCEDURE — 6360000002 HC RX W HCPCS: Performed by: NURSE PRACTITIONER

## 2024-04-09 PROCEDURE — 37224 HC FEM POP TERRITORY PLASTY: CPT | Performed by: SURGERY

## 2024-04-09 PROCEDURE — 7100000011 HC PHASE II RECOVERY - ADDTL 15 MIN: Performed by: SURGERY

## 2024-04-09 PROCEDURE — C1887 CATHETER, GUIDING: HCPCS | Performed by: SURGERY

## 2024-04-09 PROCEDURE — 37224 PR REVSC OPN/PRG FEM/POP W/ANGIOPLASTY UNI: CPT | Performed by: SURGERY

## 2024-04-09 PROCEDURE — 6360000002 HC RX W HCPCS: Performed by: SURGERY

## 2024-04-09 PROCEDURE — 2500000003 HC RX 250 WO HCPCS: Performed by: SURGERY

## 2024-04-09 PROCEDURE — 37228 PR REVSC OPN/PRQ TIB/PERO W/ANGIOPLASTY UNI: CPT | Performed by: SURGERY

## 2024-04-09 PROCEDURE — 2580000003 HC RX 258: Performed by: NURSE PRACTITIONER

## 2024-04-09 PROCEDURE — 2709999900 HC NON-CHARGEABLE SUPPLY: Performed by: SURGERY

## 2024-04-09 PROCEDURE — 37232 PR REVSC OPN/PRQ TIB/PERO W/ANGIOPLASTY UNI EA VSL: CPT | Performed by: SURGERY

## 2024-04-09 PROCEDURE — 80048 BASIC METABOLIC PNL TOTAL CA: CPT

## 2024-04-09 PROCEDURE — C1769 GUIDE WIRE: HCPCS | Performed by: SURGERY

## 2024-04-09 PROCEDURE — 75710 ARTERY X-RAYS ARM/LEG: CPT | Performed by: SURGERY

## 2024-04-09 PROCEDURE — 75774 ARTERY X-RAY EACH VESSEL: CPT | Performed by: SURGERY

## 2024-04-09 PROCEDURE — 6360000004 HC RX CONTRAST MEDICATION: Performed by: SURGERY

## 2024-04-09 PROCEDURE — 7100000010 HC PHASE II RECOVERY - FIRST 15 MIN: Performed by: SURGERY

## 2024-04-09 PROCEDURE — C1894 INTRO/SHEATH, NON-LASER: HCPCS | Performed by: SURGERY

## 2024-04-09 PROCEDURE — 85027 COMPLETE CBC AUTOMATED: CPT

## 2024-04-09 PROCEDURE — 37232 HC TIB PER TERR ADDL PLASTY: CPT | Performed by: SURGERY

## 2024-04-09 PROCEDURE — C1725 CATH, TRANSLUMIN NON-LASER: HCPCS | Performed by: SURGERY

## 2024-04-09 RX ORDER — SODIUM CHLORIDE 9 MG/ML
INJECTION, SOLUTION INTRAVENOUS CONTINUOUS
Status: DISCONTINUED | OUTPATIENT
Start: 2024-04-09 | End: 2024-04-09 | Stop reason: HOSPADM

## 2024-04-09 RX ORDER — OXYCODONE AND ACETAMINOPHEN 7.5; 325 MG/1; MG/1
1 TABLET ORAL EVERY 4 HOURS PRN
Status: ON HOLD | COMMUNITY
End: 2024-04-09 | Stop reason: SDUPTHER

## 2024-04-09 RX ORDER — PROTAMINE SULFATE 10 MG/ML
INJECTION, SOLUTION INTRAVENOUS PRN
Status: DISCONTINUED | OUTPATIENT
Start: 2024-04-09 | End: 2024-04-09 | Stop reason: HOSPADM

## 2024-04-09 RX ORDER — SODIUM CHLORIDE 0.9 % (FLUSH) 0.9 %
5-40 SYRINGE (ML) INJECTION PRN
Status: DISCONTINUED | OUTPATIENT
Start: 2024-04-09 | End: 2024-04-09 | Stop reason: HOSPADM

## 2024-04-09 RX ORDER — OXYCODONE HCL 10 MG/1
10 TABLET, FILM COATED, EXTENDED RELEASE ORAL EVERY 12 HOURS
Status: ON HOLD | COMMUNITY
End: 2024-04-09 | Stop reason: SDUPTHER

## 2024-04-09 RX ORDER — SODIUM CHLORIDE 9 MG/ML
INJECTION, SOLUTION INTRAVENOUS PRN
Status: DISCONTINUED | OUTPATIENT
Start: 2024-04-09 | End: 2024-04-09 | Stop reason: HOSPADM

## 2024-04-09 RX ORDER — FENTANYL CITRATE 50 UG/ML
INJECTION, SOLUTION INTRAMUSCULAR; INTRAVENOUS PRN
Status: DISCONTINUED | OUTPATIENT
Start: 2024-04-09 | End: 2024-04-09 | Stop reason: HOSPADM

## 2024-04-09 RX ORDER — OXYCODONE HCL 10 MG/1
10 TABLET, FILM COATED, EXTENDED RELEASE ORAL EVERY 12 HOURS
Qty: 60 EACH | Refills: 0 | Status: SHIPPED | OUTPATIENT
Start: 2024-04-09 | End: 2025-04-09

## 2024-04-09 RX ORDER — MIDAZOLAM HYDROCHLORIDE 1 MG/ML
INJECTION INTRAMUSCULAR; INTRAVENOUS PRN
Status: DISCONTINUED | OUTPATIENT
Start: 2024-04-09 | End: 2024-04-09 | Stop reason: HOSPADM

## 2024-04-09 RX ORDER — SODIUM CHLORIDE 0.9 % (FLUSH) 0.9 %
5-40 SYRINGE (ML) INJECTION EVERY 12 HOURS SCHEDULED
Status: DISCONTINUED | OUTPATIENT
Start: 2024-04-09 | End: 2024-04-09 | Stop reason: HOSPADM

## 2024-04-09 RX ORDER — OXYCODONE AND ACETAMINOPHEN 7.5; 325 MG/1; MG/1
1 TABLET ORAL EVERY 4 HOURS PRN
Qty: 120 TABLET | Refills: 0 | Status: SHIPPED | OUTPATIENT
Start: 2024-04-09 | End: 2024-05-09

## 2024-04-09 RX ORDER — HEPARIN SODIUM 10000 [USP'U]/ML
INJECTION, SOLUTION INTRAVENOUS; SUBCUTANEOUS PRN
Status: DISCONTINUED | OUTPATIENT
Start: 2024-04-09 | End: 2024-04-09 | Stop reason: HOSPADM

## 2024-04-09 RX ADMIN — CEFAZOLIN 2000 MG: 2 INJECTION, POWDER, FOR SOLUTION INTRAMUSCULAR; INTRAVENOUS at 11:12

## 2024-04-09 NOTE — DISCHARGE INSTRUCTIONS
drug. This includes over-the-counter medicines and herb or dietary supplements.   Plan ahead for refills so you do not run out.   Lifestyle Changes    You and your doctor will plan lifestyle changes that will help you recover. Some things to keep in mind include:   Atherosclerosis and high blood pressure should be carefully managed. This can be done with medicines and a healthy lifestyle.   If you smoke, talk to your doctor about quitting.   Follow-up   Call Your Doctor If Any of the Following Occurs   After you leave the hospital, call your doctor if any of the following occurs:   Redness, swelling, increasing pain, excessive bleeding, or discharge at the incision site   Signs of infection, including fever and chills   Any change of color or sensation in your legs or feet   Burning, pain, or other problems when urinating   Nausea or vomiting   Abdominal cramps or diarrhea   Unusual fatigue or depression   Disorientation or confusion   Numbness or tingling in the legs   New, unexplained symptoms   Cough   Call 911 or go to the emergency room right away if you have:   Shortness of breath   Chest pain   If you think you have an emergency,  CALL 911.

## 2024-04-09 NOTE — PROGRESS NOTES
Pt currently does not have ride home via ambulette. Called Althea with Social work to aide in getting ride. Awaiting response.

## 2024-04-09 NOTE — H&P
SpO2: 98%     CONSTITUTIONAL:  awake, alert, cooperative, no apparent distress, and appears stated age  NECK:  supple, symmetrical, trachea midline  LUNGS:  no increased work of breathing, good resp excursion  CARDIOVASCULAR:  S1S2  ABDOMEN:  soft, non-distended and non-tenderl   Pulse Exam   L dorsalis pedis biphasic   L posterior tibial Weakly biphasic     LABS:    Lab Results   Component Value Date    WBC 10.9 04/09/2024    HGB 10.6 (L) 04/09/2024    HCT 34.6 (L) 04/09/2024     04/09/2024    PROTIME 11.3 05/12/2021    INR 1.0 05/12/2021    APTT 32.7 05/12/2021    K 5.8 (H) 04/09/2024    BUN 26 (H) 04/09/2024    CREATININE 2.0 (H) 04/09/2024     Assesment:  Peripheral vascular disease.  L LE tissue loss  PLAN:    Aortogram,  Left lower extremity arteriogram, possible intervention.  I reviewed the procedure with the patient and family as available.  I discussed the procedure, risks, benefits, complications, and alternatives of the procedure.  They understand and consent.  All questions were answered    Sydnee Baca MD

## 2024-04-09 NOTE — PROGRESS NOTES
Patient brought from CVL lab to holding area. Left groin site remains clean, dry, intact, no bleeding or hematoma. VSS, patient tolerating po fluids. Comfort measures offered.

## 2024-04-09 NOTE — OP NOTE
was treated with plasty of the 6x150 DCB impact.      The TP trunk was really not present.    Anatomy appeared to be peroneal first, PT and than AT come off from bk pop.      The peroneal was accessed and treated with 4x80 ballon proximally.    The proximal AT was accessed and treated with 4x80 baloon.    There was evidence of some residual stenosis.  Completion angiogram noted much improved filling distally and brisk flow though the stenotic area.      Sheath removed and pressure held.  25 mg of protamine given.      Postop Exam  Right AKA    Plan  Continue Medical Management with pletal and plavix  Encourage continued tobacco avoidance  No plans for further surgical intervention   Pt remains high risk for limb loss  Continue hbo  Sydnee Baca MD    PCP : Juanito Kline DO  Podiatrist :

## 2024-04-10 ENCOUNTER — HOSPITAL ENCOUNTER (OUTPATIENT)
Dept: WOUND CARE | Age: 69
Discharge: HOME | End: 2024-04-10
Attending: SURGERY
Payer: MEDICARE

## 2024-04-10 ENCOUNTER — HOSPITAL ENCOUNTER (OUTPATIENT)
Dept: HYPERBARIC MEDICINE | Age: 69
Discharge: HOME | End: 2024-04-10
Payer: MEDICARE

## 2024-04-10 VITALS
SYSTOLIC BLOOD PRESSURE: 128 MMHG | TEMPERATURE: 98.2 F | DIASTOLIC BLOOD PRESSURE: 74 MMHG | RESPIRATION RATE: 16 BRPM | HEART RATE: 84 BPM

## 2024-04-10 DIAGNOSIS — L97.224 DIABETIC ULCER OF LEFT CALF ASSOCIATED WITH TYPE 2 DIABETES MELLITUS, WITH NECROSIS OF BONE (HCC): Primary | ICD-10-CM

## 2024-04-10 DIAGNOSIS — M86.462 CHRONIC OSTEOMYELITIS OF LEFT FIBULA WITH DRAINING SINUS (HCC): Primary | ICD-10-CM

## 2024-04-10 DIAGNOSIS — E11.622 DIABETIC ULCER OF LEFT CALF ASSOCIATED WITH TYPE 2 DIABETES MELLITUS, WITH NECROSIS OF BONE (HCC): Primary | ICD-10-CM

## 2024-04-10 DIAGNOSIS — M86.462 CHRONIC OSTEOMYELITIS OF LEFT FIBULA WITH DRAINING SINUS (HCC): ICD-10-CM

## 2024-04-10 DIAGNOSIS — I70.243 ATHEROSCLEROSIS OF NATIVE ARTERY OF LEFT LEG WITH ULCERATION OF ANKLE (HCC): ICD-10-CM

## 2024-04-10 DIAGNOSIS — I70.243 ATHEROSCLEROSIS OF NATIVE ARTERY OF LEFT LEG WITH ULCERATION OF ANKLE (HCC): Chronic | ICD-10-CM

## 2024-04-10 DIAGNOSIS — L97.224 DIABETIC ULCER OF LEFT CALF ASSOCIATED WITH TYPE 2 DIABETES MELLITUS, WITH NECROSIS OF BONE (HCC): ICD-10-CM

## 2024-04-10 DIAGNOSIS — E11.622 DIABETIC ULCER OF LEFT CALF ASSOCIATED WITH TYPE 2 DIABETES MELLITUS, WITH NECROSIS OF BONE (HCC): ICD-10-CM

## 2024-04-10 LAB
GLUCOSE BLD-MCNC: 121 MG/DL (ref 74–99)
GLUCOSE BLD-MCNC: 127 MG/DL (ref 74–99)
GLUCOSE BLD-MCNC: 147 MG/DL (ref 74–99)

## 2024-04-10 PROCEDURE — 11046 DBRDMT MUSC&/FSCA EA ADDL: CPT | Performed by: SURGERY

## 2024-04-10 PROCEDURE — 99183 HYPERBARIC OXYGEN THERAPY: CPT | Performed by: SURGERY

## 2024-04-10 PROCEDURE — 11043 DBRDMT MUSC&/FSCA 1ST 20/<: CPT | Performed by: SURGERY

## 2024-04-10 PROCEDURE — 11043 DBRDMT MUSC&/FSCA 1ST 20/<: CPT

## 2024-04-10 PROCEDURE — 82962 GLUCOSE BLOOD TEST: CPT

## 2024-04-10 PROCEDURE — G0277 HBOT, FULL BODY CHAMBER, 30M: HCPCS

## 2024-04-10 PROCEDURE — 11046 DBRDMT MUSC&/FSCA EA ADDL: CPT

## 2024-04-10 RX ORDER — SODIUM CHLOR/HYPOCHLOROUS ACID 0.033 %
SOLUTION, IRRIGATION IRRIGATION ONCE
OUTPATIENT
Start: 2024-04-10 | End: 2024-04-10

## 2024-04-10 RX ORDER — LIDOCAINE HYDROCHLORIDE 20 MG/ML
JELLY TOPICAL ONCE
OUTPATIENT
Start: 2024-04-10 | End: 2024-04-10

## 2024-04-10 RX ORDER — TRIAMCINOLONE ACETONIDE 1 MG/G
OINTMENT TOPICAL ONCE
OUTPATIENT
Start: 2024-04-10 | End: 2024-04-10

## 2024-04-10 RX ORDER — LIDOCAINE HYDROCHLORIDE 40 MG/ML
SOLUTION TOPICAL ONCE
Status: COMPLETED | OUTPATIENT
Start: 2024-04-10 | End: 2024-04-10

## 2024-04-10 RX ORDER — LIDOCAINE 40 MG/G
CREAM TOPICAL ONCE
OUTPATIENT
Start: 2024-04-10 | End: 2024-04-10

## 2024-04-10 RX ORDER — BACITRACIN ZINC 500 [USP'U]/G
OINTMENT TOPICAL ONCE
OUTPATIENT
Start: 2024-04-10 | End: 2024-04-10

## 2024-04-10 RX ORDER — IBUPROFEN 200 MG
TABLET ORAL ONCE
OUTPATIENT
Start: 2024-04-10 | End: 2024-04-10

## 2024-04-10 RX ORDER — LIDOCAINE 50 MG/G
OINTMENT TOPICAL ONCE
OUTPATIENT
Start: 2024-04-10 | End: 2024-04-10

## 2024-04-10 RX ORDER — GENTAMICIN SULFATE 1 MG/G
OINTMENT TOPICAL ONCE
OUTPATIENT
Start: 2024-04-10 | End: 2024-04-10

## 2024-04-10 RX ORDER — BETAMETHASONE DIPROPIONATE 0.5 MG/G
CREAM TOPICAL ONCE
OUTPATIENT
Start: 2024-04-10 | End: 2024-04-10

## 2024-04-10 RX ORDER — CLOBETASOL PROPIONATE 0.5 MG/G
OINTMENT TOPICAL ONCE
OUTPATIENT
Start: 2024-04-10 | End: 2024-04-10

## 2024-04-10 RX ORDER — LIDOCAINE HYDROCHLORIDE 40 MG/ML
SOLUTION TOPICAL ONCE
OUTPATIENT
Start: 2024-04-10 | End: 2024-04-10

## 2024-04-10 RX ORDER — BACITRACIN ZINC AND POLYMYXIN B SULFATE 500; 1000 [USP'U]/G; [USP'U]/G
OINTMENT TOPICAL ONCE
OUTPATIENT
Start: 2024-04-10 | End: 2024-04-10

## 2024-04-10 RX ADMIN — LIDOCAINE HYDROCHLORIDE 10 ML: 40 SOLUTION TOPICAL at 13:30

## 2024-04-10 NOTE — PROGRESS NOTES
Patient refused ambulette transfer to home because  time 3-4 hours for now with [facility]. He states he found a ride.  Rolando at [facility] notified of [phone] that  cancelled. Yessi Alberts RN

## 2024-04-10 NOTE — PROGRESS NOTES
Discharge instructions provided to patient regarding care of groin site and following up with Dr Baca. Pt stated understood as he had done this before and had no other questions.

## 2024-04-10 NOTE — DISCHARGE INSTRUCTIONS
Discharge Instructions for  Hyperbaric Oxygen Therapy  [facility]  [address]  [place], [place] [address]  Telephone: [phone]     FAX [phone]    NAME:  Florentin Stark  YOB: 1955  MEDICAL RECORD NUMBER:  [id number]  DATE:  4/10/2024    You have been treated with 100% oxygen in the Hyperbaric Chamber at the [facility].  There are usually no adverse effects or problems which follow this treatment.  However, for comfort and safety, we strongly recommend these guidelines are followed:    It is perfectly normal to feel tired after a hyperbaric treatment.  This tiredness should go away 2 to 3 days after your last treatment.  Avoid heavy exertion, exercise or other unnecessary activity if you are feeling tired.   Some people develop a feeling of fullness or stuffiness in their ears.  This is a result of a small amount of fluid build-up in the middle ear.  You may take an over the counter decongestant if approved by your doctor.  Follow the instructions in the medicine package for the amount to take.  If this problem lasts for more than 48 hours, please call your personal doctor.  You also may call or return to the [facility] Hyperbaric Medicine Department and have a Hyperbaric physician examine you.  In rare cases, patients may have so called “late effects” after the treatments.  Please call the Hyperbaric Medicine Department if you have any of these symptoms:     [x]Headache that is not relieved by over the counter pain medicine.  [x]Changes in vision.  During the course of many hyperbaric treatments (20 or more) some patients may complain of a temporary problem with sharp focus on distant objects.  This is a result of changes in the shape of the lens.  Your vision should return to normal in 6 to 8 weeks.  [x]Severe pain in your ears.  [x]Nausea or vomiting.  [x]Difficulty concentrating.  [x]Clumsiness, difficulty

## 2024-04-10 NOTE — PROGRESS NOTES
[facility]  Hyperbaric Oxygen Therapy   Progress Note    NAME: Florentin Stark  MEDICAL RECORD NUMBER:  [id number]  AGE: 68 y.o.   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  See doc flowsheets for documentation.  Objective      Please see lab results for finger stick glucose results  Pre treatment Vital Signs       Temp: 97.4 °F (36.3 °C)     Pulse: 74     Respirations: 16     BP: 134/64     Post treatment Vital Signs  Temp: 98.2 °F (36.8 °C)  Pulse: 84  Respirations: 16  BP: 128/74  Assessment      Physical Exam:  General Appearance:  alert and oriented to person, place and time, well-developed and well-nourished, in no acute distress  ENT:  tympanic membranes intact bilaterally  Pulmonary/Chest:  clear to auscultation bilaterally- no wheezes, rales or rhonchi, normal air movement, no respiratory distress  Cardiovascular:  S1S2     Treatment Start Time: 0957     Pressure Reached Time: 1007  ALEXUS : 2  Number of Air Breaks:  Treatment Status: No Air break     Decompression Time: 1138   Treatment End Time: 1148  Length of Treatment: 90 Minutes  Symptoms Noted During

## 2024-04-11 LAB — ECHO BSA: 2.43 M2

## 2024-04-11 NOTE — PROGRESS NOTES
Follow up with Dr. Brooks for foot care     STOP SMOKING!      Angiogram in future- Dr. KAMINSKI office will call and schedule.      [facility] followup visit : 1 week(in pm) _______________________  (Please note your next appointment above and if you are unable to keep, kindly give a 24 hour notice. Thank you.)     Physician signature:__________________________      If you experience any of the following, please call the Wound Care Center during business hours:     * Increase in Pain  * Temperature over 101  * Increase in drainage from your wound  * Drainage with a foul odor  * Bleeding  * Increase in swelling  * Need for compression bandage changes due to slippage, breakthrough drainage.     If you need medical attention outside of the business hours of the Wound Care Centers please contact your PCP or go to the nearest emergency room.               Electronically signed by Sydnee Baca MD

## 2024-04-12 ENCOUNTER — HOSPITAL ENCOUNTER (OUTPATIENT)
Dept: HYPERBARIC MEDICINE | Age: 69
Discharge: HOME | End: 2024-04-12
Payer: MEDICARE

## 2024-04-12 VITALS
DIASTOLIC BLOOD PRESSURE: 78 MMHG | TEMPERATURE: 97.6 F | RESPIRATION RATE: 16 BRPM | SYSTOLIC BLOOD PRESSURE: 124 MMHG | HEART RATE: 80 BPM

## 2024-04-12 DIAGNOSIS — E11.622 DIABETIC ULCER OF LEFT CALF ASSOCIATED WITH TYPE 2 DIABETES MELLITUS, WITH NECROSIS OF BONE (HCC): Primary | ICD-10-CM

## 2024-04-12 DIAGNOSIS — M86.462 CHRONIC OSTEOMYELITIS OF LEFT FIBULA WITH DRAINING SINUS (HCC): ICD-10-CM

## 2024-04-12 DIAGNOSIS — L97.224 DIABETIC ULCER OF LEFT CALF ASSOCIATED WITH TYPE 2 DIABETES MELLITUS, WITH NECROSIS OF BONE (HCC): Primary | ICD-10-CM

## 2024-04-12 LAB
GLUCOSE BLD-MCNC: 114 MG/DL (ref 74–99)
GLUCOSE BLD-MCNC: 128 MG/DL (ref 74–99)
GLUCOSE BLD-MCNC: 138 MG/DL (ref 74–99)

## 2024-04-12 PROCEDURE — G0277 HBOT, FULL BODY CHAMBER, 30M: HCPCS

## 2024-04-12 PROCEDURE — 82962 GLUCOSE BLOOD TEST: CPT

## 2024-04-12 NOTE — DISCHARGE INSTRUCTIONS
Visit Discharge/Physician Orders     Discharge condition: Stable     Assessment of pain at discharge: yes     Anesthetic used: 4% lidocaine      Discharge to: Home     Left via:Private automobile     Accompanied by: family     ECF/HHA: [facility]      Dressing Orders: LEFT LATERAL ANKLE: Cleanse wound with normal saline. COVER WITH DRAWTEX. APPLY Profore Change M-W (in [facility])-F.       IF WRAP FALLS 2 INCHES, OR IF PAIN INCREASES AND BECOMES INTOLERABLE,  REMOVE WRAP AND APPLY DOUBLE TUBIGRIP.  CALL WOUND CARE CENTER.       Treatment Orders: Eat a diet high in protein and vitamin C. Take a multiple vitamin daily unless contraindicated.      Elevate leg as much as possible.     Follow up with Dr. Brooks for foot care     STOP SMOKING!      Angiogram in future- Dr. KAMINSKI office will call and schedule.      [facility] followup visit : 1 week(in pm) _______________________  (Please note your next appointment above and if you are unable to keep, kindly give a 24 hour notice. Thank you.)     Physician signature:__________________________      If you experience any of the following, please call the Wound Care Center during business hours:     * Increase in Pain  * Temperature over 101  * Increase in drainage from your wound  * Drainage with a foul odor  * Bleeding  * Increase in swelling  * Need for compression bandage changes due to slippage, breakthrough drainage.     If you need medical attention outside of the business hours of the Wound Care Centers please contact your PCP or go to the nearest emergency room.

## 2024-04-12 NOTE — DISCHARGE INSTRUCTIONS
Discharge Instructions for  Hyperbaric Oxygen Therapy  [facility]  [address]  [place], [place] [address]  Telephone: [phone]     FAX [phone]    NAME:  Florentin Stark  YOB: 1955  MEDICAL RECORD NUMBER:  [id number]  DATE:  4/12/2024    You have been treated with 100% oxygen in the Hyperbaric Chamber at the [facility].  There are usually no adverse effects or problems which follow this treatment.  However, for comfort and safety, we strongly recommend these guidelines are followed:    It is perfectly normal to feel tired after a hyperbaric treatment.  This tiredness should go away 2 to 3 days after your last treatment.  Avoid heavy exertion, exercise or other unnecessary activity if you are feeling tired.   Some people develop a feeling of fullness or stuffiness in their ears.  This is a result of a small amount of fluid build-up in the middle ear.  You may take an over the counter decongestant if approved by your doctor.  Follow the instructions in the medicine package for the amount to take.  If this problem lasts for more than 48 hours, please call your personal doctor.  You also may call or return to the [facility] Hyperbaric Medicine Department and have a Hyperbaric physician examine you.  In rare cases, patients may have so called “late effects” after the treatments.  Please call the Hyperbaric Medicine Department if you have any of these symptoms:     [x]Headache that is not relieved by over the counter pain medicine.  [x]Changes in vision.  During the course of many hyperbaric treatments (20 or more) some patients may complain of a temporary problem with sharp focus on distant objects.  This is a result of changes in the shape of the lens.  Your vision should return to normal in 6 to 8 weeks.  [x]Severe pain in your ears.  [x]Nausea or vomiting.  [x]Difficulty concentrating.  [x]Clumsiness, difficulty

## 2024-04-15 ENCOUNTER — HOSPITAL ENCOUNTER (OUTPATIENT)
Dept: HYPERBARIC MEDICINE | Age: 69
Discharge: HOME | End: 2024-04-15
Payer: MEDICARE

## 2024-04-15 VITALS
HEART RATE: 73 BPM | SYSTOLIC BLOOD PRESSURE: 160 MMHG | RESPIRATION RATE: 16 BRPM | DIASTOLIC BLOOD PRESSURE: 70 MMHG | TEMPERATURE: 97.1 F

## 2024-04-15 DIAGNOSIS — E11.622 DIABETIC ULCER OF LEFT CALF ASSOCIATED WITH TYPE 2 DIABETES MELLITUS, WITH NECROSIS OF BONE (HCC): Primary | ICD-10-CM

## 2024-04-15 DIAGNOSIS — M86.462 CHRONIC OSTEOMYELITIS OF LEFT FIBULA WITH DRAINING SINUS (HCC): ICD-10-CM

## 2024-04-15 DIAGNOSIS — L97.224 DIABETIC ULCER OF LEFT CALF ASSOCIATED WITH TYPE 2 DIABETES MELLITUS, WITH NECROSIS OF BONE (HCC): Primary | ICD-10-CM

## 2024-04-15 LAB
GLUCOSE BLD-MCNC: 137 MG/DL (ref 74–99)
GLUCOSE BLD-MCNC: 188 MG/DL (ref 74–99)

## 2024-04-15 PROCEDURE — G0277 HBOT, FULL BODY CHAMBER, 30M: HCPCS

## 2024-04-15 PROCEDURE — 99183 HYPERBARIC OXYGEN THERAPY: CPT | Performed by: SURGERY

## 2024-04-15 PROCEDURE — 82962 GLUCOSE BLOOD TEST: CPT

## 2024-04-15 NOTE — DISCHARGE INSTRUCTIONS
Discharge Instructions for  Hyperbaric Oxygen Therapy  [facility]  [address]  [place], [place] [address]  Telephone: [phone]     FAX [phone]    NAME:  Florentin Stark  YOB: 1955  MEDICAL RECORD NUMBER:  [id number]  DATE:  4/15/2024    You have been treated with 100% oxygen in the Hyperbaric Chamber at the [facility].  There are usually no adverse effects or problems which follow this treatment.  However, for comfort and safety, we strongly recommend these guidelines are followed:    It is perfectly normal to feel tired after a hyperbaric treatment.  This tiredness should go away 2 to 3 days after your last treatment.  Avoid heavy exertion, exercise or other unnecessary activity if you are feeling tired.   Some people develop a feeling of fullness or stuffiness in their ears.  This is a result of a small amount of fluid build-up in the middle ear.  You may take an over the counter decongestant if approved by your doctor.  Follow the instructions in the medicine package for the amount to take.  If this problem lasts for more than 48 hours, please call your personal doctor.  You also may call or return to the [facility] Hyperbaric Medicine Department and have a Hyperbaric physician examine you.  In rare cases, patients may have so called “late effects” after the treatments.  Please call the Hyperbaric Medicine Department if you have any of these symptoms:     [x]Headache that is not relieved by over the counter pain medicine.  [x]Changes in vision.  During the course of many hyperbaric treatments (20 or more) some patients may complain of a temporary problem with sharp focus on distant objects.  This is a result of changes in the shape of the lens.  Your vision should return to normal in 6 to 8 weeks.  [x]Severe pain in your ears.  [x]Nausea or vomiting.  [x]Difficulty concentrating.  [x]Clumsiness, difficulty

## 2024-04-15 NOTE — PROGRESS NOTES
[facility]  Hyperbaric Oxygen Therapy   Progress Note    NAME: Florentin Stark  MEDICAL RECORD NUMBER:  [id number]  AGE: 68 y.o.   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  See doc flowsheets for documentation.  Objective      Please see lab results for finger stick glucose results  Pre treatment Vital Signs       Temp: 97.9 °F (36.6 °C)     Pulse: 76     Respirations: 16     BP: 120/68     Post treatment Vital Signs  Temp: 97.1 °F (36.2 °C)  Pulse: 73  Respirations: 16  BP: (!) 160/70  Assessment      Physical Exam:  General Appearance:  alert and oriented to person, place and time, well-developed and well-nourished, in no acute distress  ENT:  tympanic membranes intact bilaterally  Pulmonary/Chest:  clear to auscultation bilaterally- no wheezes, rales or rhonchi, normal air movement, no respiratory distress  Cardiovascular:  regular rate and rhythm  Chamber #: 39  Treatment Start Time: 1010     Pressure Reached Time: 1020  ALEXUS : 2  Number of Air Breaks:  Treatment Status: No Air break     Decompression Time: 1150   Treatment End Time: 1157  Length of Treatment: 90 Minutes  Symptoms Noted During Treatment: None  Total Treatment Time (min): 107    Adverse Event: no    I was present on these premises and immediately available to furnish assistance & direction throughout the procedure.   Plan      Florentin

## 2024-04-16 ENCOUNTER — HOSPITAL ENCOUNTER (OUTPATIENT)
Dept: HYPERBARIC MEDICINE | Age: 69
Discharge: HOME | End: 2024-04-16
Payer: MEDICARE

## 2024-04-16 VITALS
DIASTOLIC BLOOD PRESSURE: 78 MMHG | SYSTOLIC BLOOD PRESSURE: 142 MMHG | HEART RATE: 84 BPM | TEMPERATURE: 98.3 F | RESPIRATION RATE: 16 BRPM

## 2024-04-16 DIAGNOSIS — L97.224 DIABETIC ULCER OF LEFT CALF ASSOCIATED WITH TYPE 2 DIABETES MELLITUS, WITH NECROSIS OF BONE (HCC): Primary | ICD-10-CM

## 2024-04-16 DIAGNOSIS — M86.462 CHRONIC OSTEOMYELITIS OF LEFT FIBULA WITH DRAINING SINUS (HCC): ICD-10-CM

## 2024-04-16 DIAGNOSIS — E11.622 DIABETIC ULCER OF LEFT CALF ASSOCIATED WITH TYPE 2 DIABETES MELLITUS, WITH NECROSIS OF BONE (HCC): Primary | ICD-10-CM

## 2024-04-16 LAB
GLUCOSE BLD-MCNC: 115 MG/DL (ref 74–99)
GLUCOSE BLD-MCNC: 178 MG/DL (ref 74–99)
GLUCOSE BLD-MCNC: 183 MG/DL (ref 74–99)

## 2024-04-16 PROCEDURE — 82962 GLUCOSE BLOOD TEST: CPT

## 2024-04-16 PROCEDURE — G0277 HBOT, FULL BODY CHAMBER, 30M: HCPCS

## 2024-04-16 PROCEDURE — 99183 HYPERBARIC OXYGEN THERAPY: CPT | Performed by: SURGERY

## 2024-04-16 NOTE — DISCHARGE INSTRUCTIONS
Discharge Instructions for  Hyperbaric Oxygen Therapy  [facility]  [address]  [place], [place] [address]  Telephone: [phone]     FAX [phone]    NAME:  Florentin Stark  YOB: 1955  MEDICAL RECORD NUMBER:  [id number]  DATE:  4/16/2024    You have been treated with 100% oxygen in the Hyperbaric Chamber at the [facility].  There are usually no adverse effects or problems which follow this treatment.  However, for comfort and safety, we strongly recommend these guidelines are followed:    It is perfectly normal to feel tired after a hyperbaric treatment.  This tiredness should go away 2 to 3 days after your last treatment.  Avoid heavy exertion, exercise or other unnecessary activity if you are feeling tired.   Some people develop a feeling of fullness or stuffiness in their ears.  This is a result of a small amount of fluid build-up in the middle ear.  You may take an over the counter decongestant if approved by your doctor.  Follow the instructions in the medicine package for the amount to take.  If this problem lasts for more than 48 hours, please call your personal doctor.  You also may call or return to the [facility] Hyperbaric Medicine Department and have a Hyperbaric physician examine you.  In rare cases, patients may have so called “late effects” after the treatments.  Please call the Hyperbaric Medicine Department if you have any of these symptoms:     [x]Headache that is not relieved by over the counter pain medicine.  [x]Changes in vision.  During the course of many hyperbaric treatments (20 or more) some patients may complain of a temporary problem with sharp focus on distant objects.  This is a result of changes in the shape of the lens.  Your vision should return to normal in 6 to 8 weeks.  [x]Severe pain in your ears.  [x]Nausea or vomiting.  [x]Difficulty concentrating.  [x]Clumsiness, difficulty

## 2024-04-16 NOTE — PROGRESS NOTES
[facility]  Hyperbaric Oxygen Therapy   Progress Note    NAME: Florentin Stark  MEDICAL RECORD NUMBER:  [id number]  AGE: 68 y.o.   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  See doc flowsheets for documentation.  Objective      Please see lab results for finger stick glucose results  Pre treatment Vital Signs       Temp: 97 °F (36.1 °C)     Pulse: 79     Respirations: 16     BP: (!) 150/66     Post treatment Vital Signs  Temp: 98.3 °F (36.8 °C)  Pulse: 84  Respirations: 16  BP: (!) 142/78  Assessment      Physical Exam:  General Appearance:  alert and oriented to person, place and time, well-developed and well-nourished, in no acute distress  ENT:  tympanic membranes intact bilaterally  Pulmonary/Chest:  clear to auscultation bilaterally- no wheezes, rales or rhonchi, normal air movement, no respiratory distress  Cardiovascular:  regular rate and rhythm  Chamber #: 39  Treatment Start Time: 0955     Pressure Reached Time: 1005  ALEXUS : 2  Number of Air Breaks:  Treatment Status: No Air break     Decompression Time: 1135   Treatment End Time: 1145  Length of Treatment: 90 Minutes  Symptoms Noted During Treatment: None  Total Treatment Time (min): 110    Adverse Event: no    I was present on these premises and immediately available to furnish assistance & direction throughout the

## 2024-04-17 ENCOUNTER — HOSPITAL ENCOUNTER (OUTPATIENT)
Dept: WOUND CARE | Age: 69
Discharge: HOME | End: 2024-04-17
Attending: SURGERY
Payer: MEDICARE

## 2024-04-17 ENCOUNTER — HOSPITAL ENCOUNTER (OUTPATIENT)
Dept: HYPERBARIC MEDICINE | Age: 69
Discharge: HOME | End: 2024-04-17
Payer: MEDICARE

## 2024-04-17 VITALS
DIASTOLIC BLOOD PRESSURE: 60 MMHG | HEART RATE: 66 BPM | RESPIRATION RATE: 16 BRPM | SYSTOLIC BLOOD PRESSURE: 140 MMHG | TEMPERATURE: 97.4 F

## 2024-04-17 VITALS — HEIGHT: 74 IN | WEIGHT: 250 LBS | BODY MASS INDEX: 32.08 KG/M2

## 2024-04-17 DIAGNOSIS — E11.622 DIABETIC ULCER OF LEFT CALF ASSOCIATED WITH TYPE 2 DIABETES MELLITUS, WITH NECROSIS OF BONE (HCC): Primary | ICD-10-CM

## 2024-04-17 DIAGNOSIS — M86.462 CHRONIC OSTEOMYELITIS OF LEFT FIBULA WITH DRAINING SINUS (HCC): ICD-10-CM

## 2024-04-17 DIAGNOSIS — I70.243 ATHEROSCLEROSIS OF NATIVE ARTERY OF LEFT LEG WITH ULCERATION OF ANKLE (HCC): ICD-10-CM

## 2024-04-17 DIAGNOSIS — M86.462 CHRONIC OSTEOMYELITIS OF LEFT FIBULA WITH DRAINING SINUS (HCC): Primary | ICD-10-CM

## 2024-04-17 DIAGNOSIS — E11.622 DIABETIC ULCER OF LEFT CALF ASSOCIATED WITH TYPE 2 DIABETES MELLITUS, WITH NECROSIS OF BONE (HCC): ICD-10-CM

## 2024-04-17 DIAGNOSIS — L97.224 DIABETIC ULCER OF LEFT CALF ASSOCIATED WITH TYPE 2 DIABETES MELLITUS, WITH NECROSIS OF BONE (HCC): Primary | ICD-10-CM

## 2024-04-17 DIAGNOSIS — L97.224 DIABETIC ULCER OF LEFT CALF ASSOCIATED WITH TYPE 2 DIABETES MELLITUS, WITH NECROSIS OF BONE (HCC): ICD-10-CM

## 2024-04-17 LAB
GLUCOSE BLD-MCNC: 119 MG/DL (ref 74–99)
GLUCOSE BLD-MCNC: 125 MG/DL (ref 74–99)

## 2024-04-17 PROCEDURE — 11046 DBRDMT MUSC&/FSCA EA ADDL: CPT | Performed by: SURGERY

## 2024-04-17 PROCEDURE — 11046 DBRDMT MUSC&/FSCA EA ADDL: CPT

## 2024-04-17 PROCEDURE — G0277 HBOT, FULL BODY CHAMBER, 30M: HCPCS

## 2024-04-17 PROCEDURE — 82962 GLUCOSE BLOOD TEST: CPT

## 2024-04-17 PROCEDURE — 99183 HYPERBARIC OXYGEN THERAPY: CPT | Performed by: SURGERY

## 2024-04-17 PROCEDURE — 11043 DBRDMT MUSC&/FSCA 1ST 20/<: CPT | Performed by: SURGERY

## 2024-04-17 PROCEDURE — 11043 DBRDMT MUSC&/FSCA 1ST 20/<: CPT

## 2024-04-17 RX ORDER — CLOBETASOL PROPIONATE 0.5 MG/G
OINTMENT TOPICAL ONCE
OUTPATIENT
Start: 2024-04-17 | End: 2024-04-17

## 2024-04-17 RX ORDER — LIDOCAINE 40 MG/G
CREAM TOPICAL ONCE
OUTPATIENT
Start: 2024-04-17 | End: 2024-04-17

## 2024-04-17 RX ORDER — GENTAMICIN SULFATE 1 MG/G
OINTMENT TOPICAL ONCE
OUTPATIENT
Start: 2024-04-17 | End: 2024-04-17

## 2024-04-17 RX ORDER — LIDOCAINE 50 MG/G
OINTMENT TOPICAL ONCE
OUTPATIENT
Start: 2024-04-17 | End: 2024-04-17

## 2024-04-17 RX ORDER — BETAMETHASONE DIPROPIONATE 0.5 MG/G
CREAM TOPICAL ONCE
OUTPATIENT
Start: 2024-04-17 | End: 2024-04-17

## 2024-04-17 RX ORDER — TRIAMCINOLONE ACETONIDE 1 MG/G
OINTMENT TOPICAL ONCE
OUTPATIENT
Start: 2024-04-17 | End: 2024-04-17

## 2024-04-17 RX ORDER — LIDOCAINE HYDROCHLORIDE 40 MG/ML
SOLUTION TOPICAL ONCE
Status: COMPLETED | OUTPATIENT
Start: 2024-04-17 | End: 2024-04-17

## 2024-04-17 RX ORDER — IBUPROFEN 200 MG
TABLET ORAL ONCE
OUTPATIENT
Start: 2024-04-17 | End: 2024-04-17

## 2024-04-17 RX ORDER — LIDOCAINE HYDROCHLORIDE 20 MG/ML
JELLY TOPICAL ONCE
OUTPATIENT
Start: 2024-04-17 | End: 2024-04-17

## 2024-04-17 RX ORDER — BACITRACIN ZINC 500 [USP'U]/G
OINTMENT TOPICAL ONCE
OUTPATIENT
Start: 2024-04-17 | End: 2024-04-17

## 2024-04-17 RX ORDER — BACITRACIN ZINC AND POLYMYXIN B SULFATE 500; 1000 [USP'U]/G; [USP'U]/G
OINTMENT TOPICAL ONCE
OUTPATIENT
Start: 2024-04-17 | End: 2024-04-17

## 2024-04-17 RX ORDER — LIDOCAINE HYDROCHLORIDE 40 MG/ML
SOLUTION TOPICAL ONCE
OUTPATIENT
Start: 2024-04-17 | End: 2024-04-17

## 2024-04-17 RX ORDER — SODIUM CHLOR/HYPOCHLOROUS ACID 0.033 %
SOLUTION, IRRIGATION IRRIGATION ONCE
OUTPATIENT
Start: 2024-04-17 | End: 2024-04-17

## 2024-04-17 RX ADMIN — LIDOCAINE HYDROCHLORIDE 20 ML: 40 SOLUTION TOPICAL at 14:09

## 2024-04-17 NOTE — PROGRESS NOTES
Discharge instructions were reviewed with the patient and printed instructions were declined  
Hyperbarics Provider Reassessment     Florentin Stark is a 68 y.o. 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    Hyperbaric oxygen therapy physician orders,plan of care, vascular and nutritional status reviewed, addressed and updated as needed.    Pt is tolerating hyperbaric oxygen therapy  well without complications.      All wound related documentation/correspondences from the Wound Care Center and/or referring/collaborating physicians has been reviewed.     Wound:  Wound 09/26/23 Pretibial Distal;Left (Active)   Number of days: 204       Wound 11/15/23 Ankle Left;Lateral #1 left lateral ankle (Active)   Wound Image   04/10/24 1327   Wound Etiology Venous 03/27/24 1410   Dressing Status New dressing applied;Clean;Dry;Intact 04/17/24 1512   Wound Cleansed Cleansed with saline 04/17/24 1512   Dressing/Treatment ABD 04/17/24 1512   Offloading for Diabetic Foot Ulcers Diabetic shoes/inserts 04/17/24 1512   Wound Length (cm) 8.1 cm 04/17/24 1357   Wound Width (cm) 7.3 cm 04/17/24 1357   Wound Depth (cm) 1.8 cm 04/17/24 1357   Wound Surface Area (cm^2) 59.13 cm^2 04/17/24 1357   Change in Wound Size % (l*w) 21.88 04/17/24 1357   Wound Volume (cm^3) 106.434 cm^3 04/17/24 1357   Wound Healing % 0 04/17/24 1357   Post-Procedure Length (cm) 8.2 cm 04/17/24 1453   Post-Procedure Width (cm) 7.4 cm 04/17/24 1453   Post-Procedure Depth 
y.o. male  did successfully complete today's hyperbaric oxygen treatment at [facility].    In my clinical judgement, ongoing HBO therapy is  necessary at this time, given a threat to patient function, limb or life from the current condition.      Supervision and attendance of Hyperbaric Oxygen Therapy provided.  Continue HBO treatment as outlined in the treatment plan.    Hyperbaric Oxygen: Florentin Stark tolerated Treatment Number: 19 well today without complications.    Discharge Instructions were explained and given to Mr. Stark     Electronically signed by Sydnee Baca MD on 4/17/2024 at 5:18 PM

## 2024-04-17 NOTE — PROGRESS NOTES
Wound Healing Center Followup Visit Note    Referring Physician : Juanito Kline DO  Florentin Stark  MEDICAL RECORD NUMBER:  [id number]  AGE: 68 y.o.   GENDER: male  : 1955  EPISODE DATE:  2024    Subjective:     Chief Complaint   Patient presents with    Wound Check     Left lateral ankle      HISTORY of PRESENT ILLNESS HPI   Florentin Stark is a 68 y.o. male who presents today in regards to follow up evaluation and treatment of wound/ulcer.  That patient's past medical, family and social hx were reviewed and changes were made if present.    History of Wound Context:  Patient has had wound of his left lateral calf since .  He was following with Dr Brooks in regards to this as outpt.  He was admitted 2023 with worsening wounds.      He underwent angiogram with L SFA, pop plasty and L Tp trunk, Proximal PT plasty on 23.  While admitted he was seen by dr NORRIS Hurt from podiatry who had done bedside debridement.  He was discharge on 6 weeks of zosyn per ID.  He fu with Dr Camara from ID .  She started him on augmentin for 2 week after completion of zosyn 11/10.      He has not fu with Dr Brooks or Dr Hurt since discharge from hospital.    11/15/23  L DP strongly biphasic, PT weakly biphasic  Lateral calf wound is much improved in appearance  Bone is still exposed  He will continue to followup with Dr Brooks as outpt  He will fu with me in 6 weeks  We did discuss hbo but pt does not have transportation that would make this feasible - he will let me know if that would change  24  L DP biphasic, PT weakly biphasic  Appearance of wound is better, bone is still exposed  Significant amount of swelling   Coban 2, drawtek - mwf  Pt will follow in wound center going forward  24  Wound improving, bone exposed  Insurance not covering Coban 2  Still with significant swelling  24  Wound stable  Bone remains exposed  Swelling improved  24  Bone exposed  Wound slightly larger

## 2024-04-17 NOTE — DISCHARGE INSTRUCTIONS
Discharge Instructions for  Hyperbaric Oxygen Therapy  [facility]  [address]  [place], [place] [address]  Telephone: [phone]     FAX [phone]    NAME:  Florentin Stark  YOB: 1955  MEDICAL RECORD NUMBER:  [id number]  DATE:  4/17/2024    You have been treated with 100% oxygen in the Hyperbaric Chamber at the [facility].  There are usually no adverse effects or problems which follow this treatment.  However, for comfort and safety, we strongly recommend these guidelines are followed:    It is perfectly normal to feel tired after a hyperbaric treatment.  This tiredness should go away 2 to 3 days after your last treatment.  Avoid heavy exertion, exercise or other unnecessary activity if you are feeling tired.   Some people develop a feeling of fullness or stuffiness in their ears.  This is a result of a small amount of fluid build-up in the middle ear.  You may take an over the counter decongestant if approved by your doctor.  Follow the instructions in the medicine package for the amount to take.  If this problem lasts for more than 48 hours, please call your personal doctor.  You also may call or return to the [facility] Hyperbaric Medicine Department and have a Hyperbaric physician examine you.  In rare cases, patients may have so called “late effects” after the treatments.  Please call the Hyperbaric Medicine Department if you have any of these symptoms:     [x]Headache that is not relieved by over the counter pain medicine.  [x]Changes in vision.  During the course of many hyperbaric treatments (20 or more) some patients may complain of a temporary problem with sharp focus on distant objects.  This is a result of changes in the shape of the lens.  Your vision should return to normal in 6 to 8 weeks.  [x]Severe pain in your ears.  [x]Nausea or vomiting.  [x]Difficulty concentrating.  [x]Clumsiness, difficulty

## 2024-04-18 ENCOUNTER — HOSPITAL ENCOUNTER (OUTPATIENT)
Dept: HYPERBARIC MEDICINE | Age: 69
Discharge: HOME | End: 2024-04-18
Payer: MEDICARE

## 2024-04-18 VITALS
RESPIRATION RATE: 17 BRPM | TEMPERATURE: 97.9 F | HEART RATE: 69 BPM | SYSTOLIC BLOOD PRESSURE: 136 MMHG | DIASTOLIC BLOOD PRESSURE: 68 MMHG

## 2024-04-18 DIAGNOSIS — L97.224 DIABETIC ULCER OF LEFT CALF ASSOCIATED WITH TYPE 2 DIABETES MELLITUS, WITH NECROSIS OF BONE (HCC): Primary | ICD-10-CM

## 2024-04-18 DIAGNOSIS — E11.622 DIABETIC ULCER OF LEFT CALF ASSOCIATED WITH TYPE 2 DIABETES MELLITUS, WITH NECROSIS OF BONE (HCC): Primary | ICD-10-CM

## 2024-04-18 DIAGNOSIS — M86.462 CHRONIC OSTEOMYELITIS OF LEFT FIBULA WITH DRAINING SINUS (HCC): ICD-10-CM

## 2024-04-18 LAB
GLUCOSE BLD-MCNC: 115 MG/DL (ref 74–99)
GLUCOSE BLD-MCNC: 131 MG/DL (ref 74–99)
GLUCOSE BLD-MCNC: 145 MG/DL (ref 74–99)

## 2024-04-18 PROCEDURE — 82962 GLUCOSE BLOOD TEST: CPT

## 2024-04-18 PROCEDURE — G0277 HBOT, FULL BODY CHAMBER, 30M: HCPCS

## 2024-04-18 NOTE — DISCHARGE INSTRUCTIONS
Discharge Instructions for  Hyperbaric Oxygen Therapy  [facility]  [address]  [place], [place] [address]  Telephone: [phone]     FAX [phone]    NAME:  Florentin Stark  YOB: 1955  MEDICAL RECORD NUMBER:  1955  DATE:  4/18/2024    You have been treated with 100% oxygen in the Hyperbaric Chamber at the [facility].  There are usually no adverse effects or problems which follow this treatment.  However, for comfort and safety, we strongly recommend these guidelines are followed:    It is perfectly normal to feel tired after a hyperbaric treatment.  This tiredness should go away 2 to 3 days after your last treatment.  Avoid heavy exertion, exercise or other unnecessary activity if you are feeling tired.   Some people develop a feeling of fullness or stuffiness in their ears.  This is a result of a small amount of fluid build-up in the middle ear.  You may take an over the counter decongestant if approved by your doctor.  Follow the instructions in the medicine package for the amount to take.  If this problem lasts for more than 48 hours, please call your personal doctor.  You also may call or return to the [facility] Hyperbaric Medicine Department and have a Hyperbaric physician examine you.  In rare cases, patients may have so called “late effects” after the treatments.  Please call the Hyperbaric Medicine Department if you have any of these symptoms:     [x]Headache that is not relieved by over the counter pain medicine.  [x]Changes in vision.  During the course of many hyperbaric treatments (20 or more) some patients may complain of a temporary problem with sharp focus on distant objects.  This is a result of changes in the shape of the lens.  Your vision should return to normal in 6 to 8 weeks.  [x]Severe pain in your ears.  [x]Nausea or vomiting.  [x]Difficulty concentrating.  [x]Clumsiness, difficulty

## 2024-04-19 ENCOUNTER — HOSPITAL ENCOUNTER (OUTPATIENT)
Dept: HYPERBARIC MEDICINE | Age: 69
Discharge: HOME | End: 2024-04-19
Payer: MEDICARE

## 2024-04-19 VITALS
TEMPERATURE: 98.4 F | DIASTOLIC BLOOD PRESSURE: 66 MMHG | HEART RATE: 77 BPM | RESPIRATION RATE: 20 BRPM | SYSTOLIC BLOOD PRESSURE: 131 MMHG

## 2024-04-19 DIAGNOSIS — M86.462 CHRONIC OSTEOMYELITIS OF LEFT FIBULA WITH DRAINING SINUS (HCC): ICD-10-CM

## 2024-04-19 DIAGNOSIS — L97.224 DIABETIC ULCER OF LEFT CALF ASSOCIATED WITH TYPE 2 DIABETES MELLITUS, WITH NECROSIS OF BONE (HCC): Primary | ICD-10-CM

## 2024-04-19 DIAGNOSIS — E11.622 DIABETIC ULCER OF LEFT CALF ASSOCIATED WITH TYPE 2 DIABETES MELLITUS, WITH NECROSIS OF BONE (HCC): Primary | ICD-10-CM

## 2024-04-19 LAB
GLUCOSE BLD-MCNC: 131 MG/DL (ref 74–99)
GLUCOSE BLD-MCNC: 135 MG/DL (ref 74–99)

## 2024-04-19 PROCEDURE — 82962 GLUCOSE BLOOD TEST: CPT

## 2024-04-19 PROCEDURE — G0277 HBOT, FULL BODY CHAMBER, 30M: HCPCS

## 2024-04-19 NOTE — DISCHARGE INSTRUCTIONS
Discharge Instructions for  Hyperbaric Oxygen Therapy  [facility]  [address]  [place], [place] [address]  Telephone: [phone]     FAX [phone]    NAME:  Florentin Stark  YOB: 1955  MEDICAL RECORD NUMBER:  [id number]  DATE:  4/19/2024    You have been treated with 100% oxygen in the Hyperbaric Chamber at the [facility].  There are usually no adverse effects or problems which follow this treatment.  However, for comfort and safety, we strongly recommend these guidelines are followed:    It is perfectly normal to feel tired after a hyperbaric treatment.  This tiredness should go away 2 to 3 days after your last treatment.  Avoid heavy exertion, exercise or other unnecessary activity if you are feeling tired.   Some people develop a feeling of fullness or stuffiness in their ears.  This is a result of a small amount of fluid build-up in the middle ear.  You may take an over the counter decongestant if approved by your doctor.  Follow the instructions in the medicine package for the amount to take.  If this problem lasts for more than 48 hours, please call your personal doctor.  You also may call or return to the [facility] Hyperbaric Medicine Department and have a Hyperbaric physician examine you.  In rare cases, patients may have so called “late effects” after the treatments.  Please call the Hyperbaric Medicine Department if you have any of these symptoms:     [x]Headache that is not relieved by over the counter pain medicine.  [x]Changes in vision.  During the course of many hyperbaric treatments (20 or more) some patients may complain of a temporary problem with sharp focus on distant objects.  This is a result of changes in the shape of the lens.  Your vision should return to normal in 6 to 8 weeks.  [x]Severe pain in your ears.  [x]Nausea or vomiting.  [x]Difficulty concentrating.  [x]Clumsiness, difficulty

## 2024-04-22 NOTE — DISCHARGE INSTRUCTIONS
Visit Discharge/Physician Orders     Discharge condition: Stable     Assessment of pain at discharge: yes     Anesthetic used: 4% lidocaine      Discharge to: Home     Left via:Private automobile     Accompanied by: family     ECF/HHA: [facility]      Dressing Orders: LEFT LATERAL ANKLE: Cleanse wound with normal saline. COVER WITH DRAWTEX. APPLY Profore Change M-W (in [facility])-F.       IF WRAP FALLS 2 INCHES, OR IF PAIN INCREASES AND BECOMES INTOLERABLE,  REMOVE WRAP AND APPLY DOUBLE TUBIGRIP.  CALL WOUND CARE CENTER.       Treatment Orders: Eat a diet high in protein and vitamin C. Take a multiple vitamin daily unless contraindicated.      Elevate leg as much as possible.     Follow up with Dr. Brooks for foot care     STOP SMOKING!         [facility] followup visit : 1 week(in pm) _______________________  (Please note your next appointment above and if you are unable to keep, kindly give a 24 hour notice. Thank you.)     Physician signature:__________________________      If you experience any of the following, please call the Wound Care Center during business hours:     * Increase in Pain  * Temperature over 101  * Increase in drainage from your wound  * Drainage with a foul odor  * Bleeding  * Increase in swelling  * Need for compression bandage changes due to slippage, breakthrough drainage.     If you need medical attention outside of the business hours of the Wound Care Centers please contact your PCP or go to the nearest emergency room.

## 2024-04-23 ENCOUNTER — HOSPITAL ENCOUNTER (OUTPATIENT)
Dept: HYPERBARIC MEDICINE | Age: 69
Discharge: HOME | End: 2024-04-23
Payer: MEDICARE

## 2024-04-23 VITALS
TEMPERATURE: 98.3 F | HEART RATE: 77 BPM | DIASTOLIC BLOOD PRESSURE: 72 MMHG | RESPIRATION RATE: 20 BRPM | SYSTOLIC BLOOD PRESSURE: 118 MMHG

## 2024-04-23 DIAGNOSIS — M86.462 CHRONIC OSTEOMYELITIS OF LEFT FIBULA WITH DRAINING SINUS (HCC): ICD-10-CM

## 2024-04-23 DIAGNOSIS — E11.622 DIABETIC ULCER OF LEFT CALF ASSOCIATED WITH TYPE 2 DIABETES MELLITUS, WITH NECROSIS OF BONE (HCC): Primary | ICD-10-CM

## 2024-04-23 DIAGNOSIS — L97.224 DIABETIC ULCER OF LEFT CALF ASSOCIATED WITH TYPE 2 DIABETES MELLITUS, WITH NECROSIS OF BONE (HCC): Primary | ICD-10-CM

## 2024-04-23 LAB
GLUCOSE BLD-MCNC: 315 MG/DL (ref 74–99)
GLUCOSE BLD-MCNC: 390 MG/DL (ref 74–99)

## 2024-04-23 PROCEDURE — 82962 GLUCOSE BLOOD TEST: CPT

## 2024-04-23 PROCEDURE — G0277 HBOT, FULL BODY CHAMBER, 30M: HCPCS

## 2024-04-23 PROCEDURE — 99183 HYPERBARIC OXYGEN THERAPY: CPT | Performed by: SURGERY

## 2024-04-23 NOTE — PROGRESS NOTES
Florentin Stark is a 68 y.o. male has been receiving hyperbaric oxygen treatment for management of:Lower extremity diabetic wound. Mr. Stark has completed Treatment Number: 22 out of a treatment protocol of Total Treatments: 30.    Problem List:  Patient Active Problem List   Diagnosis Code    DM II (diabetes mellitus, type II), controlled ([facility]) E11.9    HTN (hypertension) I10    PVD (peripheral vascular disease) ([facility]) I73.9    Leukocytosis D72.829    CKD (chronic kidney disease) stage 3, GFR 30-59 ml/min ([facility]) N18.30    Tobacco dependence F17.200    HLD (hyperlipidemia) E78.5    History of vascular surgery Z98.890    Above knee amputation of right lower extremity ([facility]) S78.111A    Postoperative pain G89.18    Chronic pain syndrome G89.4    Elevated sedimentation rate R70.0    Major depressive disorder, single episode, unspecified F32.9    Muscle weakness (generalized) M62.81    Obstructive sleep apnea (adult) (pediatric) G47.33    Benign hypertensive kidney disease with chronic kidney disease I12.9    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    Secondary hyperparathyroidism of renal origin ([facility]) N25.81    Vitamin D deficiency E55.9    Open wound of left lower leg S81.802A    Osteomyelitis of left fibula ([facility]) M86.9    Atherosclerosis of native artery of left leg with ulceration of ankle ([facility]) I70.243    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    Pt is tolerating hyperbaric oxygen therapy  well without complications.         Vincent Pryor RN  4/23/2024  10:20 AM

## 2024-04-23 NOTE — DISCHARGE INSTRUCTIONS
Discharge Instructions for  Hyperbaric Oxygen Therapy  [facility]  [address]  [place], [place] [address]  Telephone: [phone]     FAX [phone]    NAME:  Florentin Stark  YOB: 1955  MEDICAL RECORD NUMBER:  [id number]  DATE:  4/23/2024    You have been treated with 100% oxygen in the Hyperbaric Chamber at the [facility].  There are usually no adverse effects or problems which follow this treatment.  However, for comfort and safety, we strongly recommend these guidelines are followed:    It is perfectly normal to feel tired after a hyperbaric treatment.  This tiredness should go away 2 to 3 days after your last treatment.  Avoid heavy exertion, exercise or other unnecessary activity if you are feeling tired.   Some people develop a feeling of fullness or stuffiness in their ears.  This is a result of a small amount of fluid build-up in the middle ear.  You may take an over the counter decongestant if approved by your doctor.  Follow the instructions in the medicine package for the amount to take.  If this problem lasts for more than 48 hours, please call your personal doctor.  You also may call or return to the [facility] Hyperbaric Medicine Department and have a Hyperbaric physician examine you.  In rare cases, patients may have so called “late effects” after the treatments.  Please call the Hyperbaric Medicine Department if you have any of these symptoms:     [x]Headache that is not relieved by over the counter pain medicine.  [x]Changes in vision.  During the course of many hyperbaric treatments (20 or more) some patients may complain of a temporary problem with sharp focus on distant objects.  This is a result of changes in the shape of the lens.  Your vision should return to normal in 6 to 8 weeks.  [x]Severe pain in your ears.  [x]Nausea or vomiting.  [x]Difficulty concentrating.  [x]Clumsiness, difficulty

## 2024-04-23 NOTE — PROGRESS NOTES
[facility]  Hyperbaric Oxygen Therapy   Progress Note    NAME: Florentin Stark  MEDICAL RECORD NUMBER:  [id number]  AGE: 68 y.o.   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  See doc flowsheets for documentation.  Objective      Please see lab results for finger stick glucose results  Pre treatment Vital Signs       Temp: 98.4 °F (36.9 °C)     Pulse: 71     Respirations: 18     BP: 128/63     Post treatment Vital Signs  Temp: 98.3 °F (36.8 °C)  Pulse: 77  Respirations: 20  BP: 118/72  Assessment      Physical Exam:  General Appearance:  alert and oriented to person, place and time, well-developed and well-nourished, in no acute distress  ENT:  tympanic membranes intact bilaterally  Pulmonary/Chest:  clear to auscultation bilaterally- no wheezes, rales or rhonchi, normal air movement, no respiratory distress  Cardiovascular:  regular rate and rhythm  Chamber #: 39  Treatment Start Time: 1002     Pressure Reached Time: 1012  ALEXUS : 2  Number of Air Breaks:  Treatment Status: No Air break     Decompression Time: 1143   Treatment End Time: 1152  Length of Treatment: 90 Minutes  Symptoms Noted During Treatment: None  Total Treatment Time (min): 110    Adverse Event: no    I was present on these premises and immediately available to furnish assistance & direction throughout the procedure.   Plan      Florentin Stark is a 68 y.o. male  did

## 2024-04-24 ENCOUNTER — HOSPITAL ENCOUNTER (OUTPATIENT)
Dept: HYPERBARIC MEDICINE | Age: 69
Discharge: HOME | End: 2024-04-24
Payer: MEDICARE

## 2024-04-24 ENCOUNTER — HOSPITAL ENCOUNTER (OUTPATIENT)
Dept: WOUND CARE | Age: 69
Discharge: HOME | End: 2024-04-24
Attending: SURGERY
Payer: MEDICARE

## 2024-04-24 VITALS
DIASTOLIC BLOOD PRESSURE: 59 MMHG | SYSTOLIC BLOOD PRESSURE: 128 MMHG | OXYGEN SATURATION: 91 % | RESPIRATION RATE: 20 BRPM | HEART RATE: 86 BPM | TEMPERATURE: 96.8 F

## 2024-04-24 DIAGNOSIS — E11.622 DIABETIC ULCER OF LEFT CALF ASSOCIATED WITH TYPE 2 DIABETES MELLITUS, WITH NECROSIS OF BONE (HCC): ICD-10-CM

## 2024-04-24 DIAGNOSIS — M86.462 CHRONIC OSTEOMYELITIS OF LEFT FIBULA WITH DRAINING SINUS (HCC): ICD-10-CM

## 2024-04-24 DIAGNOSIS — M86.462 CHRONIC OSTEOMYELITIS OF LEFT FIBULA WITH DRAINING SINUS (HCC): Primary | ICD-10-CM

## 2024-04-24 DIAGNOSIS — L97.224 DIABETIC ULCER OF LEFT CALF ASSOCIATED WITH TYPE 2 DIABETES MELLITUS, WITH NECROSIS OF BONE (HCC): ICD-10-CM

## 2024-04-24 DIAGNOSIS — I70.243 ATHEROSCLEROSIS OF NATIVE ARTERY OF LEFT LEG WITH ULCERATION OF ANKLE (HCC): ICD-10-CM

## 2024-04-24 DIAGNOSIS — L97.224 DIABETIC ULCER OF LEFT CALF ASSOCIATED WITH TYPE 2 DIABETES MELLITUS, WITH NECROSIS OF BONE (HCC): Primary | ICD-10-CM

## 2024-04-24 DIAGNOSIS — E11.622 DIABETIC ULCER OF LEFT CALF ASSOCIATED WITH TYPE 2 DIABETES MELLITUS, WITH NECROSIS OF BONE (HCC): Primary | ICD-10-CM

## 2024-04-24 LAB
GLUCOSE BLD-MCNC: 142 MG/DL (ref 74–99)
GLUCOSE BLD-MCNC: 154 MG/DL (ref 74–99)
GLUCOSE BLD-MCNC: 162 MG/DL (ref 74–99)

## 2024-04-24 PROCEDURE — G0277 HBOT, FULL BODY CHAMBER, 30M: HCPCS

## 2024-04-24 PROCEDURE — 99183 HYPERBARIC OXYGEN THERAPY: CPT | Performed by: SURGERY

## 2024-04-24 PROCEDURE — 11043 DBRDMT MUSC&/FSCA 1ST 20/<: CPT

## 2024-04-24 PROCEDURE — 82962 GLUCOSE BLOOD TEST: CPT

## 2024-04-24 RX ORDER — BACITRACIN ZINC 500 [USP'U]/G
OINTMENT TOPICAL ONCE
OUTPATIENT
Start: 2024-04-24 | End: 2024-04-24

## 2024-04-24 RX ORDER — LIDOCAINE HYDROCHLORIDE 20 MG/ML
JELLY TOPICAL ONCE
OUTPATIENT
Start: 2024-04-24 | End: 2024-04-24

## 2024-04-24 RX ORDER — GENTAMICIN SULFATE 1 MG/G
OINTMENT TOPICAL ONCE
OUTPATIENT
Start: 2024-04-24 | End: 2024-04-24

## 2024-04-24 RX ORDER — CLOBETASOL PROPIONATE 0.5 MG/G
OINTMENT TOPICAL ONCE
OUTPATIENT
Start: 2024-04-24 | End: 2024-04-24

## 2024-04-24 RX ORDER — LIDOCAINE HYDROCHLORIDE 40 MG/ML
SOLUTION TOPICAL ONCE
OUTPATIENT
Start: 2024-04-24 | End: 2024-04-24

## 2024-04-24 RX ORDER — SODIUM CHLOR/HYPOCHLOROUS ACID 0.033 %
SOLUTION, IRRIGATION IRRIGATION ONCE
OUTPATIENT
Start: 2024-04-24 | End: 2024-04-24

## 2024-04-24 RX ORDER — IBUPROFEN 200 MG
TABLET ORAL ONCE
OUTPATIENT
Start: 2024-04-24 | End: 2024-04-24

## 2024-04-24 RX ORDER — BACITRACIN ZINC AND POLYMYXIN B SULFATE 500; 1000 [USP'U]/G; [USP'U]/G
OINTMENT TOPICAL ONCE
OUTPATIENT
Start: 2024-04-24 | End: 2024-04-24

## 2024-04-24 RX ORDER — TRIAMCINOLONE ACETONIDE 1 MG/G
OINTMENT TOPICAL ONCE
OUTPATIENT
Start: 2024-04-24 | End: 2024-04-24

## 2024-04-24 RX ORDER — LIDOCAINE HYDROCHLORIDE 40 MG/ML
SOLUTION TOPICAL ONCE
Status: COMPLETED | OUTPATIENT
Start: 2024-04-24 | End: 2024-04-24

## 2024-04-24 RX ORDER — LIDOCAINE 40 MG/G
CREAM TOPICAL ONCE
OUTPATIENT
Start: 2024-04-24 | End: 2024-04-24

## 2024-04-24 RX ORDER — BETAMETHASONE DIPROPIONATE 0.5 MG/G
CREAM TOPICAL ONCE
OUTPATIENT
Start: 2024-04-24 | End: 2024-04-24

## 2024-04-24 RX ORDER — LIDOCAINE 50 MG/G
OINTMENT TOPICAL ONCE
OUTPATIENT
Start: 2024-04-24 | End: 2024-04-24

## 2024-04-24 RX ADMIN — LIDOCAINE HYDROCHLORIDE 5 ML: 40 SOLUTION TOPICAL at 14:32

## 2024-04-24 NOTE — DISCHARGE INSTRUCTIONS
Discharge Instructions for  Hyperbaric Oxygen Therapy  [facility]  [address]  [place], [place] [address]  Telephone: [phone]     FAX [phone]    NAME:  Florentin Stark  YOB: 1955  MEDICAL RECORD NUMBER:  [id number]  DATE:  4/24/2024    You have been treated with 100% oxygen in the Hyperbaric Chamber at the [facility].  There are usually no adverse effects or problems which follow this treatment.  However, for comfort and safety, we strongly recommend these guidelines are followed:    It is perfectly normal to feel tired after a hyperbaric treatment.  This tiredness should go away 2 to 3 days after your last treatment.  Avoid heavy exertion, exercise or other unnecessary activity if you are feeling tired.   Some people develop a feeling of fullness or stuffiness in their ears.  This is a result of a small amount of fluid build-up in the middle ear.  You may take an over the counter decongestant if approved by your doctor.  Follow the instructions in the medicine package for the amount to take.  If this problem lasts for more than 48 hours, please call your personal doctor.  You also may call or return to the [facility] Hyperbaric Medicine Department and have a Hyperbaric physician examine you.  In rare cases, patients may have so called “late effects” after the treatments.  Please call the Hyperbaric Medicine Department if you have any of these symptoms:     [x]Headache that is not relieved by over the counter pain medicine.  [x]Changes in vision.  During the course of many hyperbaric treatments (20 or more) some patients may complain of a temporary problem with sharp focus on distant objects.  This is a result of changes in the shape of the lens.  Your vision should return to normal in 6 to 8 weeks.  [x]Severe pain in your ears.  [x]Nausea or vomiting.  [x]Difficulty concentrating.  [x]Clumsiness, difficulty

## 2024-04-24 NOTE — PROGRESS NOTES
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, with necrosis of bone (HCC) (Chronic)    Relevant Orders    Initiate Outpatient Wound Care Protocol    Osteomyelitis of left fibula (HCC) - Primary    Relevant Orders    Initiate Outpatient Wound Care Protocol       Pre Debridement Measurements:  Are located in the Wound/Ulcer Documentation Flow Sheet  Post Debridement Measurements:  Wound/Ulcer Descriptions are Pre Debridement except measurements:    Wound 09/26/23 Pretibial Distal;Left (Active)   Number of days: 211       Wound 11/15/23 Ankle Left;Lateral #1 left lateral ankle (Active)   Wound Image   04/24/24 1426   Wound Etiology Venous 03/27/24 1410   Dressing Status New dressing applied;Clean;Dry;Intact 04/17/24 1512   Wound Cleansed Cleansed with saline 04/17/24 1512   Dressing/Treatment ABD 04/17/24 1512   Offloading for Diabetic Foot Ulcers Diabetic shoes/inserts 04/17/24 1512   Wound Length (cm) 8 cm 04/24/24 1426   Wound Width (cm) 6.7 cm 04/24/24 1426   Wound Depth (cm) 1.5 cm 04/24/24 1426   Wound Surface Area (cm^2) 53.6 cm^2 04/24/24 1426   Change in Wound Size % (l*w) 29.18 04/24/24 1426   Wound Volume (cm^3) 80.4 cm^3 04/24/24 1426   Wound Healing % 24 04/24/24 1426   Post-Procedure Length (cm) 8.2 cm 04/17/24 1453   Post-Procedure Width (cm) 7.4 cm 04/17/24 1453   Post-Procedure Depth (cm) 1.8 cm 04/17/24 1453   Post-Procedure Surface Area (cm^2) 60.68 cm^2 04/17/24 1453   Post-Procedure Volume (cm^3) 109.224 cm^3 04/17/24 1453

## 2024-04-25 ENCOUNTER — HOSPITAL ENCOUNTER (OUTPATIENT)
Dept: HYPERBARIC MEDICINE | Age: 69
Discharge: HOME | End: 2024-04-25
Payer: MEDICARE

## 2024-04-25 VITALS
SYSTOLIC BLOOD PRESSURE: 143 MMHG | OXYGEN SATURATION: 90 % | HEART RATE: 72 BPM | TEMPERATURE: 98.5 F | RESPIRATION RATE: 20 BRPM | DIASTOLIC BLOOD PRESSURE: 63 MMHG

## 2024-04-25 DIAGNOSIS — M86.462 CHRONIC OSTEOMYELITIS OF LEFT FIBULA WITH DRAINING SINUS (HCC): ICD-10-CM

## 2024-04-25 DIAGNOSIS — L97.224 DIABETIC ULCER OF LEFT CALF ASSOCIATED WITH TYPE 2 DIABETES MELLITUS, WITH NECROSIS OF BONE (HCC): Primary | ICD-10-CM

## 2024-04-25 DIAGNOSIS — E11.622 DIABETIC ULCER OF LEFT CALF ASSOCIATED WITH TYPE 2 DIABETES MELLITUS, WITH NECROSIS OF BONE (HCC): Primary | ICD-10-CM

## 2024-04-25 LAB
GLUCOSE BLD-MCNC: 164 MG/DL (ref 74–99)
GLUCOSE BLD-MCNC: 193 MG/DL (ref 74–99)

## 2024-04-25 PROCEDURE — 82962 GLUCOSE BLOOD TEST: CPT

## 2024-04-25 PROCEDURE — G0277 HBOT, FULL BODY CHAMBER, 30M: HCPCS

## 2024-04-25 NOTE — DISCHARGE INSTRUCTIONS
Visit Discharge/Physician Orders     Discharge condition: Stable     Assessment of pain at discharge: yes     Anesthetic used: 4% lidocaine      Discharge to: Home     Left via:Private automobile     Accompanied by: family     ECF/HHA: [facility]      Dressing Orders: LEFT LATERAL ANKLE: Cleanse wound with normal saline. COVER WITH DRAWTEX. APPLY Profore Change M-W (in [facility])-F.       IF WRAP FALLS 2 INCHES, OR IF PAIN INCREASES AND BECOMES INTOLERABLE,  REMOVE WRAP AND APPLY DOUBLE TUBIGRIP.  CALL WOUND CARE CENTER.       Treatment Orders: Eat a diet high in protein and vitamin C. Take a multiple vitamin daily unless contraindicated.      Elevate leg as much as possible.     Follow up with Dr. Brooks for foot care     STOP SMOKING!      Surgical debridement with grafting. Dr. KAMINSKI's Office will call and schedule.      [facility] followup visit : 2 week(in pm) _______________________  (Please note your next appointment above and if you are unable to keep, kindly give a 24 hour notice. Thank you.)     Physician signature:__________________________      If you experience any of the following, please call the Wound Care Center during business hours:     * Increase in Pain  * Temperature over 101  * Increase in drainage from your wound  * Drainage with a foul odor  * Bleeding  * Increase in swelling  * Need for compression bandage changes due to slippage, breakthrough drainage.     If you need medical attention outside of the business hours of the Wound Care Centers please contact your PCP or go to the nearest emergency room.

## 2024-04-25 NOTE — DISCHARGE INSTRUCTIONS
Discharge Instructions for  Hyperbaric Oxygen Therapy  [facility]  [address]  [place], [place] [address]  Telephone: [phone]     FAX [phone]    NAME:  Florentin Stark  YOB: 1955  MEDICAL RECORD NUMBER:  [id number]  DATE:  4/25/2024    You have been treated with 100% oxygen in the Hyperbaric Chamber at the [facility].  There are usually no adverse effects or problems which follow this treatment.  However, for comfort and safety, we strongly recommend these guidelines are followed:    It is perfectly normal to feel tired after a hyperbaric treatment.  This tiredness should go away 2 to 3 days after your last treatment.  Avoid heavy exertion, exercise or other unnecessary activity if you are feeling tired.   Some people develop a feeling of fullness or stuffiness in their ears.  This is a result of a small amount of fluid build-up in the middle ear.  You may take an over the counter decongestant if approved by your doctor.  Follow the instructions in the medicine package for the amount to take.  If this problem lasts for more than 48 hours, please call your personal doctor.  You also may call or return to the [facility] Hyperbaric Medicine Department and have a Hyperbaric physician examine you.  In rare cases, patients may have so called “late effects” after the treatments.  Please call the Hyperbaric Medicine Department if you have any of these symptoms:     [x]Headache that is not relieved by over the counter pain medicine.  [x]Changes in vision.  During the course of many hyperbaric treatments (20 or more) some patients may complain of a temporary problem with sharp focus on distant objects.  This is a result of changes in the shape of the lens.  Your vision should return to normal in 6 to 8 weeks.  [x]Severe pain in your ears.  [x]Nausea or vomiting.  [x]Difficulty concentrating.  [x]Clumsiness, difficulty

## 2024-04-26 ENCOUNTER — HOSPITAL ENCOUNTER (OUTPATIENT)
Dept: HYPERBARIC MEDICINE | Age: 69
Discharge: HOME | End: 2024-04-26

## 2024-04-27 DIAGNOSIS — E11.65 UNCONTROLLED TYPE 2 DIABETES MELLITUS WITH HYPERGLYCEMIA (HCC): ICD-10-CM

## 2024-04-29 ENCOUNTER — HOSPITAL ENCOUNTER (OUTPATIENT)
Dept: HYPERBARIC MEDICINE | Age: 69
Discharge: HOME | End: 2024-04-29
Payer: MEDICARE

## 2024-04-29 VITALS
TEMPERATURE: 97.9 F | RESPIRATION RATE: 16 BRPM | HEART RATE: 78 BPM | SYSTOLIC BLOOD PRESSURE: 140 MMHG | DIASTOLIC BLOOD PRESSURE: 61 MMHG

## 2024-04-29 DIAGNOSIS — E11.622 DIABETIC ULCER OF LEFT CALF ASSOCIATED WITH TYPE 2 DIABETES MELLITUS, WITH NECROSIS OF BONE (HCC): Primary | ICD-10-CM

## 2024-04-29 DIAGNOSIS — E11.65 UNCONTROLLED TYPE 2 DIABETES MELLITUS WITH HYPERGLYCEMIA (HCC): ICD-10-CM

## 2024-04-29 DIAGNOSIS — M86.462 CHRONIC OSTEOMYELITIS OF LEFT FIBULA WITH DRAINING SINUS (HCC): ICD-10-CM

## 2024-04-29 DIAGNOSIS — L97.224 DIABETIC ULCER OF LEFT CALF ASSOCIATED WITH TYPE 2 DIABETES MELLITUS, WITH NECROSIS OF BONE (HCC): Primary | ICD-10-CM

## 2024-04-29 LAB
GLUCOSE BLD-MCNC: 185 MG/DL (ref 74–99)
GLUCOSE BLD-MCNC: 200 MG/DL (ref 74–99)

## 2024-04-29 PROCEDURE — 82962 GLUCOSE BLOOD TEST: CPT

## 2024-04-29 PROCEDURE — G0277 HBOT, FULL BODY CHAMBER, 30M: HCPCS

## 2024-04-29 PROCEDURE — 99183 HYPERBARIC OXYGEN THERAPY: CPT | Performed by: SURGERY

## 2024-04-29 RX ORDER — INSULIN LISPRO 100 [IU]/ML
INJECTION, SOLUTION INTRAVENOUS; SUBCUTANEOUS
Qty: 15 ML | Refills: 10 | Status: SHIPPED | OUTPATIENT
Start: 2024-04-29

## 2024-04-29 RX ORDER — INSULIN LISPRO 100 [IU]/ML
INJECTION, SOLUTION INTRAVENOUS; SUBCUTANEOUS
Qty: 15 ML | Refills: 10 | Status: SHIPPED
Start: 2024-04-29 | End: 2024-04-29

## 2024-04-29 NOTE — PROGRESS NOTES
[facility]  Hyperbaric Oxygen Therapy   Progress Note    NAME: Florentin Stark  MEDICAL RECORD NUMBER:  [id number]  AGE: 68 y.o.   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  See doc flowsheets for documentation.  Objective      Please see lab results for finger stick glucose results  Pre treatment Vital Signs       Temp: 98.4 °F (36.9 °C)     Pulse: 75     Respirations: 16     BP: 132/70     Post treatment Vital Signs  Temp: 97.9 °F (36.6 °C)  Pulse: 78  Respirations: 16  BP: (!) 140/61  Assessment      Physical Exam:  General Appearance:  alert and oriented to person, place and time, well-developed and well-nourished, in no acute distress  ENT:  tympanic membranes intact bilaterally  Pulmonary/Chest:  clear to auscultation bilaterally- no wheezes, rales or rhonchi, normal air movement, no respiratory distress  Cardiovascular:  regular rate and rhythm  Chamber #: 39  Treatment Start Time: 0959     Pressure Reached Time: 1008  ALEXUS : 2  Number of Air Breaks:  Treatment Status: No Air break     Decompression Time: 1139   Treatment End Time: 1154  Length of Treatment: 90 Minutes  Symptoms Noted During Treatment: None  Total Treatment Time (min): 115    Adverse Event: no    I was present on these premises and immediately available to furnish assistance & direction throughout the procedure.   Plan      Florentin

## 2024-04-29 NOTE — DISCHARGE INSTRUCTIONS
Discharge Instructions for  Hyperbaric Oxygen Therapy  [facility]  [address]  [place], [place] [address]  Telephone: [phone]     FAX [phone]    NAME:  Florentin Stark  YOB: 1955  MEDICAL RECORD NUMBER:  [id number]  DATE:  4/29/2024    You have been treated with 100% oxygen in the Hyperbaric Chamber at the [facility].  There are usually no adverse effects or problems which follow this treatment.  However, for comfort and safety, we strongly recommend these guidelines are followed:    It is perfectly normal to feel tired after a hyperbaric treatment.  This tiredness should go away 2 to 3 days after your last treatment.  Avoid heavy exertion, exercise or other unnecessary activity if you are feeling tired.   Some people develop a feeling of fullness or stuffiness in their ears.  This is a result of a small amount of fluid build-up in the middle ear.  You may take an over the counter decongestant if approved by your doctor.  Follow the instructions in the medicine package for the amount to take.  If this problem lasts for more than 48 hours, please call your personal doctor.  You also may call or return to the [facility] Hyperbaric Medicine Department and have a Hyperbaric physician examine you.  In rare cases, patients may have so called “late effects” after the treatments.  Please call the Hyperbaric Medicine Department if you have any of these symptoms:     [x]Headache that is not relieved by over the counter pain medicine.  [x]Changes in vision.  During the course of many hyperbaric treatments (20 or more) some patients may complain of a temporary problem with sharp focus on distant objects.  This is a result of changes in the shape of the lens.  Your vision should return to normal in 6 to 8 weeks.  [x]Severe pain in your ears.  [x]Nausea or vomiting.  [x]Difficulty concentrating.  [x]Clumsiness, difficulty

## 2024-04-30 ENCOUNTER — HOSPITAL ENCOUNTER (OUTPATIENT)
Dept: HYPERBARIC MEDICINE | Age: 69
Discharge: HOME | End: 2024-04-30
Payer: MEDICARE

## 2024-04-30 VITALS
SYSTOLIC BLOOD PRESSURE: 140 MMHG | HEART RATE: 78 BPM | RESPIRATION RATE: 20 BRPM | TEMPERATURE: 96.5 F | DIASTOLIC BLOOD PRESSURE: 84 MMHG

## 2024-04-30 DIAGNOSIS — M86.462 CHRONIC OSTEOMYELITIS OF LEFT FIBULA WITH DRAINING SINUS (HCC): ICD-10-CM

## 2024-04-30 DIAGNOSIS — E11.622 DIABETIC ULCER OF LEFT CALF ASSOCIATED WITH TYPE 2 DIABETES MELLITUS, WITH NECROSIS OF BONE (HCC): Primary | ICD-10-CM

## 2024-04-30 DIAGNOSIS — L97.224 DIABETIC ULCER OF LEFT CALF ASSOCIATED WITH TYPE 2 DIABETES MELLITUS, WITH NECROSIS OF BONE (HCC): Primary | ICD-10-CM

## 2024-04-30 LAB
GLUCOSE BLD-MCNC: 169 MG/DL (ref 74–99)
GLUCOSE BLD-MCNC: 203 MG/DL (ref 74–99)

## 2024-04-30 PROCEDURE — 99183 HYPERBARIC OXYGEN THERAPY: CPT | Performed by: SURGERY

## 2024-04-30 PROCEDURE — 82962 GLUCOSE BLOOD TEST: CPT

## 2024-04-30 PROCEDURE — G0277 HBOT, FULL BODY CHAMBER, 30M: HCPCS

## 2024-04-30 NOTE — DISCHARGE INSTRUCTIONS
Discharge Instructions for  Hyperbaric Oxygen Therapy  [facility]  [address]  [place], [place] [address]  Telephone: [phone]     FAX [phone]    NAME:  Florentin Stark  YOB: 1955  MEDICAL RECORD NUMBER:  [id number]  DATE:  4/30/2024    You have been treated with 100% oxygen in the Hyperbaric Chamber at the [facility].  There are usually no adverse effects or problems which follow this treatment.  However, for comfort and safety, we strongly recommend these guidelines are followed:    It is perfectly normal to feel tired after a hyperbaric treatment.  This tiredness should go away 2 to 3 days after your last treatment.  Avoid heavy exertion, exercise or other unnecessary activity if you are feeling tired.   Some people develop a feeling of fullness or stuffiness in their ears.  This is a result of a small amount of fluid build-up in the middle ear.  You may take an over the counter decongestant if approved by your doctor.  Follow the instructions in the medicine package for the amount to take.  If this problem lasts for more than 48 hours, please call your personal doctor.  You also may call or return to the [facility] Hyperbaric Medicine Department and have a Hyperbaric physician examine you.  In rare cases, patients may have so called “late effects” after the treatments.  Please call the Hyperbaric Medicine Department if you have any of these symptoms:     [x]Headache that is not relieved by over the counter pain medicine.  [x]Changes in vision.  During the course of many hyperbaric treatments (20 or more) some patients may complain of a temporary problem with sharp focus on distant objects.  This is a result of changes in the shape of the lens.  Your vision should return to normal in 6 to 8 weeks.  [x]Severe pain in your ears.  [x]Nausea or vomiting.  [x]Difficulty concentrating.  [x]Clumsiness, difficulty

## 2024-04-30 NOTE — PROGRESS NOTES
[facility]  Hyperbaric Oxygen Therapy   Progress Note    NAME: Florentin Stark  MEDICAL RECORD NUMBER:  [id number]  AGE: 68 y.o.   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  See doc flowsheets for documentation.  Objective      Please see lab results for finger stick glucose results  Pre treatment Vital Signs       Temp: 97.2 °F (36.2 °C)     Pulse: 76     Respirations: 20     BP: (!) 144/72     Post treatment Vital Signs  Temp: (!) 96.5 °F (35.8 °C)  Pulse: 78  Respirations: 20  BP: (!) 140/84  Assessment      Physical Exam:  General Appearance:  alert and oriented to person, place and time, well-developed and well-nourished, in no acute distress  ENT:  tympanic membranes intact bilaterally  Pulmonary/Chest:  clear to auscultation bilaterally- no wheezes, rales or rhonchi, normal air movement, no respiratory distress  Cardiovascular:  regular rate and rhythm  Chamber #: 39  Treatment Start Time: 0949     Pressure Reached Time: 0958  ALEXUS : 2  Number of Air Breaks:  Treatment Status: No Air break     Decompression Time: 1128   Treatment End Time: 1136  Length of Treatment: 90 Minutes  Symptoms Noted During Treatment: None  Total Treatment Time (min): 107    Adverse Event: no    I was present on these premises and immediately available to furnish assistance & direction throughout the procedure.   Plan

## 2024-05-01 ENCOUNTER — HOSPITAL ENCOUNTER (OUTPATIENT)
Dept: WOUND CARE | Age: 69
Discharge: HOME | End: 2024-05-01
Attending: SURGERY
Payer: MEDICARE

## 2024-05-01 ENCOUNTER — HOSPITAL ENCOUNTER (OUTPATIENT)
Dept: HYPERBARIC MEDICINE | Age: 69
Discharge: HOME | End: 2024-05-01
Payer: MEDICARE

## 2024-05-01 ENCOUNTER — TELEPHONE (OUTPATIENT)
Dept: VASCULAR SURGERY | Age: 69
End: 2024-05-01

## 2024-05-01 ENCOUNTER — PREP FOR PROCEDURE (OUTPATIENT)
Dept: VASCULAR SURGERY | Age: 69
End: 2024-05-01

## 2024-05-01 VITALS
DIASTOLIC BLOOD PRESSURE: 68 MMHG | SYSTOLIC BLOOD PRESSURE: 122 MMHG | RESPIRATION RATE: 16 BRPM | HEART RATE: 74 BPM | TEMPERATURE: 97.8 F

## 2024-05-01 DIAGNOSIS — M86.462 CHRONIC OSTEOMYELITIS OF LEFT FIBULA WITH DRAINING SINUS (HCC): ICD-10-CM

## 2024-05-01 DIAGNOSIS — L97.224 DIABETIC ULCER OF LEFT CALF ASSOCIATED WITH TYPE 2 DIABETES MELLITUS, WITH NECROSIS OF BONE (HCC): Primary | ICD-10-CM

## 2024-05-01 DIAGNOSIS — L97.224 DIABETIC ULCER OF LEFT CALF ASSOCIATED WITH TYPE 2 DIABETES MELLITUS, WITH NECROSIS OF BONE (HCC): ICD-10-CM

## 2024-05-01 DIAGNOSIS — I70.243 ATHEROSCLEROSIS OF NATIVE ARTERY OF LEFT LEG WITH ULCERATION OF ANKLE (HCC): ICD-10-CM

## 2024-05-01 DIAGNOSIS — I70.243 ATHEROSCLEROSIS OF NATIVE ARTERY OF LEFT LEG WITH ULCERATION OF ANKLE (HCC): Chronic | ICD-10-CM

## 2024-05-01 DIAGNOSIS — E11.622 DIABETIC ULCER OF LEFT CALF ASSOCIATED WITH TYPE 2 DIABETES MELLITUS, WITH NECROSIS OF BONE (HCC): ICD-10-CM

## 2024-05-01 DIAGNOSIS — E11.622 DIABETIC ULCER OF LEFT CALF ASSOCIATED WITH TYPE 2 DIABETES MELLITUS, WITH NECROSIS OF BONE (HCC): Primary | ICD-10-CM

## 2024-05-01 DIAGNOSIS — M86.462 CHRONIC OSTEOMYELITIS OF LEFT FIBULA WITH DRAINING SINUS (HCC): Primary | ICD-10-CM

## 2024-05-01 PROBLEM — S81.802A WOUND OF LEFT LEG: Status: ACTIVE | Noted: 2024-05-01

## 2024-05-01 LAB
GLUCOSE BLD-MCNC: 141 MG/DL (ref 74–99)
GLUCOSE BLD-MCNC: 165 MG/DL (ref 74–99)

## 2024-05-01 PROCEDURE — 11043 DBRDMT MUSC&/FSCA 1ST 20/<: CPT

## 2024-05-01 PROCEDURE — 82962 GLUCOSE BLOOD TEST: CPT

## 2024-05-01 PROCEDURE — G0277 HBOT, FULL BODY CHAMBER, 30M: HCPCS

## 2024-05-01 PROCEDURE — 11046 DBRDMT MUSC&/FSCA EA ADDL: CPT

## 2024-05-01 PROCEDURE — 99183 HYPERBARIC OXYGEN THERAPY: CPT | Performed by: SURGERY

## 2024-05-01 RX ORDER — LIDOCAINE HYDROCHLORIDE 20 MG/ML
JELLY TOPICAL ONCE
OUTPATIENT
Start: 2024-05-01 | End: 2024-05-01

## 2024-05-01 RX ORDER — BACITRACIN ZINC AND POLYMYXIN B SULFATE 500; 1000 [USP'U]/G; [USP'U]/G
OINTMENT TOPICAL ONCE
OUTPATIENT
Start: 2024-05-01 | End: 2024-05-01

## 2024-05-01 RX ORDER — SODIUM CHLOR/HYPOCHLOROUS ACID 0.033 %
SOLUTION, IRRIGATION IRRIGATION ONCE
OUTPATIENT
Start: 2024-05-01 | End: 2024-05-01

## 2024-05-01 RX ORDER — LIDOCAINE 40 MG/G
CREAM TOPICAL ONCE
OUTPATIENT
Start: 2024-05-01 | End: 2024-05-01

## 2024-05-01 RX ORDER — TRIAMCINOLONE ACETONIDE 1 MG/G
OINTMENT TOPICAL ONCE
OUTPATIENT
Start: 2024-05-01 | End: 2024-05-01

## 2024-05-01 RX ORDER — LIDOCAINE 50 MG/G
OINTMENT TOPICAL ONCE
OUTPATIENT
Start: 2024-05-01 | End: 2024-05-01

## 2024-05-01 RX ORDER — GENTAMICIN SULFATE 1 MG/G
OINTMENT TOPICAL ONCE
OUTPATIENT
Start: 2024-05-01 | End: 2024-05-01

## 2024-05-01 RX ORDER — LIDOCAINE HYDROCHLORIDE 40 MG/ML
SOLUTION TOPICAL ONCE
Status: COMPLETED | OUTPATIENT
Start: 2024-05-01 | End: 2024-05-01

## 2024-05-01 RX ORDER — IBUPROFEN 200 MG
TABLET ORAL ONCE
OUTPATIENT
Start: 2024-05-01 | End: 2024-05-01

## 2024-05-01 RX ORDER — CLOBETASOL PROPIONATE 0.5 MG/G
OINTMENT TOPICAL ONCE
OUTPATIENT
Start: 2024-05-01 | End: 2024-05-01

## 2024-05-01 RX ORDER — BACITRACIN ZINC 500 [USP'U]/G
OINTMENT TOPICAL ONCE
OUTPATIENT
Start: 2024-05-01 | End: 2024-05-01

## 2024-05-01 RX ORDER — LIDOCAINE HYDROCHLORIDE 40 MG/ML
SOLUTION TOPICAL ONCE
OUTPATIENT
Start: 2024-05-01 | End: 2024-05-01

## 2024-05-01 RX ORDER — BETAMETHASONE DIPROPIONATE 0.5 MG/G
CREAM TOPICAL ONCE
OUTPATIENT
Start: 2024-05-01 | End: 2024-05-01

## 2024-05-01 RX ADMIN — LIDOCAINE HYDROCHLORIDE 10 ML: 40 SOLUTION TOPICAL at 13:40

## 2024-05-01 NOTE — TELEPHONE ENCOUNTER
Scheduled (L) LE debridement with Dr. Baca 5/8/24 at 12:00 pm, message left on pt's voicemail to report to [facility] at 10:00 a.m, be NPO after midnight the night before except heart and/or BP meds in the a.m with sips of water and to have transportation.

## 2024-05-01 NOTE — PROGRESS NOTES
10    cilostazol (PLETAL) 50 MG tablet TAKE 1 TABLET BY MOUTH TWICE DAILY 60 tablet 10    hydrALAZINE (APRESOLINE) 100 MG tablet Take 1 tablet by mouth 3 times daily      ammonium lactate (LAC-HYDRIN) 12 % lotion APPLY TOPICALLY TWICE DAILY AS NEEDED 400 g 10    insulin glargine (LANTUS SOLOSTAR) 100 UNIT/ML injection pen INJECT 10 UNITS SUBCUTANEOUSLY IN THE MORNING AND 30 UNITS EVERY EVENING 15 mL 10    gabapentin (NEURONTIN) 800 MG tablet TAKE 1 TABLET BY MOUTH FOUR TIMES DAILY 120 tablet 10    DULoxetine (CYMBALTA) 60 MG extended release capsule TAKE TWO (2) CAPSULES BY MOUTH EVERY MORNING 60 capsule 10    mirtazapine (REMERON) 7.5 MG tablet TAKE ONE TABLET BY MOUTH ONCE NIGHTLY 90 tablet 3    blood glucose test strips (ONETOUCH ULTRA) strip USE TO TEST BLOOD SUGAR 4 TIMES DAILY 200 strip 10    Lancets (ONETOUCH DELICA PLUS JNIAMY92A) MISC USE TO TEST BLOOD SUGAR 4 TIMES DAILY 200 each 10    amLODIPine (NORVASC) 10 MG tablet TAKE 1 TABLET BY MOUTH ONCE DAILY 30 tablet 10    Alcohol Swabs (EASY TOUCH ALCOHOL PREP MEDIUM) 70 % PADS USE TO TEST BLOOD SUGAR 4 TIMES DAILY 200 each 10    chlorthalidone (HYGROTON) 25 MG tablet TAKE 1 TABLET BY MOUTH DAILY 30 tablet 10    vitamin D (ERGOCALCIFEROL) 1.25 MG (70766 UT) CAPS capsule mondays      metoprolol tartrate (LOPRESSOR) 25 MG tablet TAKE 1 TABLET BY MOUTH TWICE DAILY 60 tablet 10    Insulin Pen Needle 31G X 8 MM MISC 1 each by Does not apply route 5 times daily 500 each 2    Insulin Syringe-Needle U-100 (ULTICARE INSULIN SYRINGE) 31G X 5/16\" 0.3 ML MISC 1 each by Other route 5 times daily 500 each 3    naloxone 4 MG/0.1ML LIQD nasal spray 1 spray by Nasal route as needed for Opioid Reversal 1 each 5    Handicap Placard MISC by Does not apply route Patient cannot walk 200 ft without stopping to rest.    Expiration 10/21/2024 1 each 0     No current facility-administered medications on file prior to encounter.       REVIEW OF SYSTEMS See HPI    Objective:    There

## 2024-05-01 NOTE — PROGRESS NOTES
[facility]  Hyperbaric Oxygen Therapy   Progress Note    NAME: Florentin Stark  MEDICAL RECORD NUMBER:  [id number]  AGE: 68 y.o.   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  See doc flowsheets for documentation.  Objective      Please see lab results for finger stick glucose results  Pre treatment Vital Signs       Temp: 97.1 °F (36.2 °C)     Pulse: 74     Respirations: 14     BP: (!) 141/79     Post treatment Vital Signs  Temp: 97.8 °F (36.6 °C)  Pulse: 74  Respirations: 16  BP: 122/68  Assessment      Physical Exam:  General Appearance:  alert and oriented to person, place and time, well-developed and well-nourished, in no acute distress  ENT:  tympanic membranes intact bilaterally  Pulmonary/Chest:  clear to auscultation bilaterally- no wheezes, rales or rhonchi, normal air movement, no respiratory distress  Cardiovascular:  S1S2  Chamber #: 39  Treatment Start Time: 0945     Pressure Reached Time: 0955  ALEXUS : 2  Number of Air Breaks:  Treatment Status: No Air break     Decompression Time: 1126   Treatment End Time: 1133  Length of Treatment: 90 Minutes  Symptoms Noted During Treatment: None  Total Treatment Time (min): 108    Adverse Event: no    I was present on these premises and immediately available to

## 2024-05-01 NOTE — DISCHARGE INSTRUCTIONS
Discharge Instructions for  Hyperbaric Oxygen Therapy  [facility]  [address]  [place], [place] [address]  Telephone: [phone]     FAX [phone]    NAME:  Florentin Stark  YOB: 1955  MEDICAL RECORD NUMBER:  [id number]  DATE:  5/1/2024    You have been treated with 100% oxygen in the Hyperbaric Chamber at the [facility].  There are usually no adverse effects or problems which follow this treatment.  However, for comfort and safety, we strongly recommend these guidelines are followed:    It is perfectly normal to feel tired after a hyperbaric treatment.  This tiredness should go away 2 to 3 days after your last treatment.  Avoid heavy exertion, exercise or other unnecessary activity if you are feeling tired.   Some people develop a feeling of fullness or stuffiness in their ears.  This is a result of a small amount of fluid build-up in the middle ear.  You may take an over the counter decongestant if approved by your doctor.  Follow the instructions in the medicine package for the amount to take.  If this problem lasts for more than 48 hours, please call your personal doctor.  You also may call or return to the [facility] Hyperbaric Medicine Department and have a Hyperbaric physician examine you.  In rare cases, patients may have so called “late effects” after the treatments.  Please call the Hyperbaric Medicine Department if you have any of these symptoms:     [x]Headache that is not relieved by over the counter pain medicine.  [x]Changes in vision.  During the course of many hyperbaric treatments (20 or more) some patients may complain of a temporary problem with sharp focus on distant objects.  This is a result of changes in the shape of the lens.  Your vision should return to normal in 6 to 8 weeks.  [x]Severe pain in your ears.  [x]Nausea or vomiting.  [x]Difficulty concentrating.  [x]Clumsiness, difficulty

## 2024-05-02 ENCOUNTER — HOSPITAL ENCOUNTER (OUTPATIENT)
Dept: HYPERBARIC MEDICINE | Age: 69
Discharge: HOME | End: 2024-05-02

## 2024-05-03 NOTE — PROGRESS NOTES
[facility]   PRE-ADMISSION TESTING GENERAL INSTRUCTIONS  PAT Phone Number: [phone]      GENERAL INSTRUCTIONS:    [x] Antibacterial Soap Shower Night before and/or AM of surgery.  [] CHG Wipes instruction sheet and wipes given.  [x] Do not wear makeup, lotions, powders, deodorant the morning of surgery.  [x] Nothing to eat or drink after midnight. This includes no gum, candy, mints or water.  [x] You may brush your teeth, gargle, but do not swallow water.   [x] No tobacco products, illegal drugs, or alcohol within 24 hours of your surgery.  [x] Jewelry or valuables should not be brought to the hospital. All body and/or tongue piercing's must be removed prior to arriving to hospital. No contact lens or hair pins.   [x] Arrange transportation with a responsible adult  to and from the hospital. Arrange for someone to be with you for the remainder of the day and for 24 hours after your procedure due to having had anesthesia.          -Who will be your  for transportation? Dennise        -Who will be staying with you for 24 hrs after your procedure? Dennise  [x] Bring insurance card and photo ID.  [] Bring copy of living will or healthcare power of  papers to be placed in your electronic record.  [] Urine Pregnancy test will be preformed the day of surgery. A specimen sample may be brought from home.  [] Transfusion (Green) Bracelet: Please bring with you to hospital, day of surgery.     PARKING INSTRUCTIONS:     [x] ARRIVAL DATE & TIME: 5/8/24 at 1000  [x] Times are subject to change. We will contact you the business day before surgery if that were to occur.  [x] Enter into the [facility] Entrance. Two people may accompany you. Masks are not required.  [x] Parking Lot \"I\" is where you will park. It is located on the corner of [address] and [address]. The entrance is on [address].   Only one vehicle - per patient, is permitted in parking lot.   Upon

## 2024-05-07 ENCOUNTER — HOSPITAL ENCOUNTER (OUTPATIENT)
Dept: HYPERBARIC MEDICINE | Age: 69
Discharge: HOME | End: 2024-05-07

## 2024-05-07 ENCOUNTER — ANESTHESIA EVENT (OUTPATIENT)
Dept: OPERATING ROOM | Age: 69
End: 2024-05-07
Payer: MEDICARE

## 2024-05-07 DIAGNOSIS — G89.4 CHRONIC PAIN SYNDROME: ICD-10-CM

## 2024-05-07 DIAGNOSIS — G89.18 POSTOPERATIVE PAIN: ICD-10-CM

## 2024-05-07 RX ORDER — OXYCODONE HCL 10 MG/1
10 TABLET, FILM COATED, EXTENDED RELEASE ORAL EVERY 12 HOURS
Qty: 60 EACH | Refills: 0 | Status: SHIPPED | OUTPATIENT
Start: 2024-05-07 | End: 2025-05-07

## 2024-05-07 RX ORDER — OXYCODONE AND ACETAMINOPHEN 7.5; 325 MG/1; MG/1
1 TABLET ORAL EVERY 4 HOURS PRN
Qty: 120 TABLET | Refills: 0 | Status: SHIPPED | OUTPATIENT
Start: 2024-05-07 | End: 2024-06-06

## 2024-05-07 ASSESSMENT — LIFESTYLE VARIABLES: SMOKING_STATUS: 1

## 2024-05-07 NOTE — ANESTHESIA PRE PROCEDURE
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, with necrosis of bone ([facility]) E11.622, L97.224   • Wound of left leg S81.802A       Past Medical History:        Diagnosis Date   • 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[facility]) 01/16/2020   • Acute kidney injury ([facility]) 05/18/2020   • AKA stump complication ([facility]) 05/12/2021   • 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 ([facility]) 05/22/2018   • Atherosclerosis of native artery of left leg with ulceration of ankle ([facility]) 09/29/2023   • 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 ([facility]) 05/21/2018   • Cellulitis, scrotum 03/20/2020   • Coagulopathy ([facility]) 05/21/2018   • Critical lower limb ischemia ([facility]) 03/20/2020   • Diabetes mellitus ([facility])    • Diabetic foot infection ([facility]) 09/26/2023   • 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, with necrosis of bone ([facility]) 11/15/2023   • 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 2 diabetes mellitus, with fat layer exposed ([facility]) 05/22/2018   • Disruption of closure of muscle or muscle flap, sequela 08/26/2020   • DVT, lower extremity ([facility])     right leg    • Encounter for dental examination and cleaning with abnormal findings 04/02/2018   • Gas gangrene of thigh ([facility]) 03/20/2020   • History of DVT (deep vein thrombosis) 07/31/2018   • Hyperglycemia due to type 2 diabetes mellitus ([facility]) 03/20/2020   • Hyperlipidemia    • Hypertension    • Ischemic ulcer of right thigh with fat layer exposed ([facility])    • Ischemic ulcer of right thigh with necrosis of muscle ([facility])    • Legionnaire's disease

## 2024-05-07 NOTE — PROGRESS NOTES
Patient called in to say that they would not be in for treatment today. States that he has had a stomach virus for the last few days.

## 2024-05-08 ENCOUNTER — ANESTHESIA (OUTPATIENT)
Dept: OPERATING ROOM | Age: 69
End: 2024-05-08
Payer: MEDICARE

## 2024-05-08 ENCOUNTER — HOSPITAL ENCOUNTER (OUTPATIENT)
Age: 69
Setting detail: OUTPATIENT SURGERY
Discharge: HOME | End: 2024-05-08
Attending: SURGERY | Admitting: SURGERY
Payer: MEDICARE

## 2024-05-08 VITALS
BODY MASS INDEX: 32.08 KG/M2 | OXYGEN SATURATION: 100 % | DIASTOLIC BLOOD PRESSURE: 57 MMHG | TEMPERATURE: 98 F | WEIGHT: 250 LBS | HEART RATE: 71 BPM | RESPIRATION RATE: 18 BRPM | SYSTOLIC BLOOD PRESSURE: 140 MMHG | HEIGHT: 74 IN

## 2024-05-08 DIAGNOSIS — S81.802A WOUND OF LEFT LEG: ICD-10-CM

## 2024-05-08 DIAGNOSIS — Z01.812 PRE-OPERATIVE LABORATORY EXAMINATION: Primary | ICD-10-CM

## 2024-05-08 LAB
ERYTHROCYTE [DISTWIDTH] IN BLOOD BY AUTOMATED COUNT: 19.9 % (ref 11.5–15)
GLUCOSE BLD-MCNC: 69 MG/DL (ref 74–99)
GLUCOSE BLD-MCNC: 72 MG/DL (ref 74–99)
HCT VFR BLD AUTO: 36.4 % (ref 37–54)
HGB BLD-MCNC: 10.5 G/DL (ref 12.5–16.5)
INR PPP: 1.2
MCH RBC QN AUTO: 24.4 PG (ref 26–35)
MCHC RBC AUTO-ENTMCNC: 28.8 G/DL (ref 32–34.5)
MCV RBC AUTO: 84.7 FL (ref 80–99.9)
PLATELET # BLD AUTO: 292 K/UL (ref 130–450)
PMV BLD AUTO: 11 FL (ref 7–12)
PROTHROMBIN TIME: 12.7 SEC (ref 9.3–12.4)
RBC # BLD AUTO: 4.3 M/UL (ref 3.8–5.8)
WBC OTHER # BLD: 11.5 K/UL (ref 4.5–11.5)

## 2024-05-08 PROCEDURE — 3600000002 HC SURGERY LEVEL 2 BASE: Performed by: SURGERY

## 2024-05-08 PROCEDURE — 3600000012 HC SURGERY LEVEL 2 ADDTL 15MIN: Performed by: SURGERY

## 2024-05-08 PROCEDURE — 7100000000 HC PACU RECOVERY - FIRST 15 MIN: Performed by: SURGERY

## 2024-05-08 PROCEDURE — 87075 CULTR BACTERIA EXCEPT BLOOD: CPT

## 2024-05-08 PROCEDURE — 87077 CULTURE AEROBIC IDENTIFY: CPT

## 2024-05-08 PROCEDURE — 7100000011 HC PHASE II RECOVERY - ADDTL 15 MIN: Performed by: SURGERY

## 2024-05-08 PROCEDURE — 3700000001 HC ADD 15 MINUTES (ANESTHESIA): Performed by: SURGERY

## 2024-05-08 PROCEDURE — 85610 PROTHROMBIN TIME: CPT

## 2024-05-08 PROCEDURE — 2709999900 HC NON-CHARGEABLE SUPPLY: Performed by: SURGERY

## 2024-05-08 PROCEDURE — 87181 SC STD AGAR DILUTION PER AGT: CPT

## 2024-05-08 PROCEDURE — 85027 COMPLETE CBC AUTOMATED: CPT

## 2024-05-08 PROCEDURE — 7100000001 HC PACU RECOVERY - ADDTL 15 MIN: Performed by: SURGERY

## 2024-05-08 PROCEDURE — 87205 SMEAR GRAM STAIN: CPT

## 2024-05-08 PROCEDURE — 87070 CULTURE OTHR SPECIMN AEROBIC: CPT

## 2024-05-08 PROCEDURE — 6370000000 HC RX 637 (ALT 250 FOR IP): Performed by: SURGERY

## 2024-05-08 PROCEDURE — 6360000002 HC RX W HCPCS

## 2024-05-08 PROCEDURE — 7100000010 HC PHASE II RECOVERY - FIRST 15 MIN: Performed by: SURGERY

## 2024-05-08 PROCEDURE — 82962 GLUCOSE BLOOD TEST: CPT

## 2024-05-08 PROCEDURE — 6360000002 HC RX W HCPCS: Performed by: NURSE PRACTITIONER

## 2024-05-08 PROCEDURE — 2580000003 HC RX 258: Performed by: NURSE PRACTITIONER

## 2024-05-08 PROCEDURE — 3700000000 HC ANESTHESIA ATTENDED CARE: Performed by: SURGERY

## 2024-05-08 PROCEDURE — 2500000003 HC RX 250 WO HCPCS

## 2024-05-08 PROCEDURE — 2580000003 HC RX 258

## 2024-05-08 DEVICE — CYTAL® WOUND MATRIX 3-LAYER, 5CM X 5CM
Type: IMPLANTABLE DEVICE | Status: FUNCTIONAL
Brand: CYTAL®

## 2024-05-08 DEVICE — MICROMATRIX® FLEX 5CC
Type: IMPLANTABLE DEVICE | Status: FUNCTIONAL
Brand: MICROMATRIX®

## 2024-05-08 RX ORDER — DEXMEDETOMIDINE HYDROCHLORIDE 100 UG/ML
INJECTION, SOLUTION INTRAVENOUS PRN
Status: DISCONTINUED | OUTPATIENT
Start: 2024-05-08 | End: 2024-05-08 | Stop reason: SDUPTHER

## 2024-05-08 RX ORDER — SODIUM CHLORIDE 9 MG/ML
INJECTION, SOLUTION INTRAVENOUS CONTINUOUS
Status: DISCONTINUED | OUTPATIENT
Start: 2024-05-08 | End: 2024-05-08 | Stop reason: HOSPADM

## 2024-05-08 RX ORDER — OSTOMY ADHESIVE
STRIP MISCELLANEOUS PRN
Status: DISCONTINUED | OUTPATIENT
Start: 2024-05-08 | End: 2024-05-08 | Stop reason: HOSPADM

## 2024-05-08 RX ORDER — SODIUM CHLORIDE 9 MG/ML
INJECTION, SOLUTION INTRAVENOUS PRN
Status: DISCONTINUED | OUTPATIENT
Start: 2024-05-08 | End: 2024-05-08 | Stop reason: HOSPADM

## 2024-05-08 RX ORDER — SODIUM CHLORIDE 0.9 % (FLUSH) 0.9 %
5-40 SYRINGE (ML) INJECTION PRN
Status: DISCONTINUED | OUTPATIENT
Start: 2024-05-08 | End: 2024-05-08 | Stop reason: HOSPADM

## 2024-05-08 RX ORDER — SODIUM CHLORIDE 9 MG/ML
INJECTION, SOLUTION INTRAVENOUS CONTINUOUS PRN
Status: DISCONTINUED | OUTPATIENT
Start: 2024-05-08 | End: 2024-05-08 | Stop reason: SDUPTHER

## 2024-05-08 RX ORDER — MIDAZOLAM HYDROCHLORIDE 1 MG/ML
INJECTION INTRAMUSCULAR; INTRAVENOUS PRN
Status: DISCONTINUED | OUTPATIENT
Start: 2024-05-08 | End: 2024-05-08 | Stop reason: SDUPTHER

## 2024-05-08 RX ORDER — ONDANSETRON 2 MG/ML
4 INJECTION INTRAMUSCULAR; INTRAVENOUS
Status: DISCONTINUED | OUTPATIENT
Start: 2024-05-08 | End: 2024-05-08 | Stop reason: HOSPADM

## 2024-05-08 RX ORDER — LIDOCAINE HYDROCHLORIDE 20 MG/ML
JELLY TOPICAL PRN
Status: DISCONTINUED | OUTPATIENT
Start: 2024-05-08 | End: 2024-05-08 | Stop reason: HOSPADM

## 2024-05-08 RX ORDER — SODIUM CHLORIDE 0.9 % (FLUSH) 0.9 %
5-40 SYRINGE (ML) INJECTION EVERY 12 HOURS SCHEDULED
Status: DISCONTINUED | OUTPATIENT
Start: 2024-05-08 | End: 2024-05-08 | Stop reason: HOSPADM

## 2024-05-08 RX ORDER — FENTANYL CITRATE 50 UG/ML
25 INJECTION, SOLUTION INTRAMUSCULAR; INTRAVENOUS EVERY 5 MIN PRN
Status: DISCONTINUED | OUTPATIENT
Start: 2024-05-08 | End: 2024-05-08 | Stop reason: HOSPADM

## 2024-05-08 RX ORDER — NALOXONE HYDROCHLORIDE 0.4 MG/ML
INJECTION, SOLUTION INTRAMUSCULAR; INTRAVENOUS; SUBCUTANEOUS PRN
Status: DISCONTINUED | OUTPATIENT
Start: 2024-05-08 | End: 2024-05-08 | Stop reason: HOSPADM

## 2024-05-08 RX ADMIN — MIDAZOLAM 2 MG: 1 INJECTION INTRAMUSCULAR; INTRAVENOUS at 12:04

## 2024-05-08 RX ADMIN — DEXMEDETOMIDINE HCL 8 MCG: 100 INJECTION INTRAVENOUS at 12:04

## 2024-05-08 RX ADMIN — CEFAZOLIN 2000 MG: 2 INJECTION, POWDER, FOR SOLUTION INTRAMUSCULAR; INTRAVENOUS at 12:12

## 2024-05-08 RX ADMIN — SODIUM CHLORIDE: 9 INJECTION, SOLUTION INTRAVENOUS at 11:58

## 2024-05-08 ASSESSMENT — PAIN DESCRIPTION - DESCRIPTORS: DESCRIPTORS: ACHING;SORE;DULL

## 2024-05-08 ASSESSMENT — PAIN - FUNCTIONAL ASSESSMENT
PAIN_FUNCTIONAL_ASSESSMENT: PREVENTS OR INTERFERES SOME ACTIVE ACTIVITIES AND ADLS
PAIN_FUNCTIONAL_ASSESSMENT: 0-10
PAIN_FUNCTIONAL_ASSESSMENT: NONE - DENIES PAIN

## 2024-05-08 NOTE — H&P
Vascular Surgery History & Physical Exam      Chief Complaint: Chronic wound of the L LE    HISTORY OF PRESENT ILLNESS:                The patient is a 68 y.o. male who presents to the hospital for elective debridement of chronic wound of the L LE.    IMPRESSION:  Chronic wound of the L LE    PLAN:  Debridement of chronic wound of the L LE.                 Possible wound vac, possible advanced skin therapy.      I reviewed the procedure with the patient and family as available.  I discussed the procedure, risks, benefits, complications, and alternatives of the procedure.  They understand and consent.  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[facility]) 01/16/2020    Acute kidney injury ([facility]) 05/18/2020    AKA stump complication ([facility]) 05/12/2021    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 ([facility]) 05/22/2018    Atherosclerosis of native artery of left leg with ulceration of ankle ([facility]) 09/29/2023    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 ([facility]) 05/21/2018    Cellulitis, scrotum 03/20/2020    Coagulopathy ([facility]) 05/21/2018    Critical lower limb ischemia ([facility]) 03/20/2020    Diabetes mellitus ([facility])     Diabetic foot infection ([facility]) 09/26/2023    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 CASH / JANETTE TO SEE performed by Wes Pretty MD at [facility] [place]    LEG SURGERY Right 4/23/2020    RIGHT THIGH WOUND DRESSING CHANGE and DEBRIDEMENT performed by Sydnee Baca MD at [facility] [place]    LEG SURGERY Right 10/15/2020    DEBRIDEMENT RIGHT ABOVE KNEE AMPUTATION POSS. REVISION POSS. WOUND VAC performed by Sydnee Baca MD at [facility] [place]    LEG SURGERY Right 12/17/2020    DEBRIDEMENT  RIGHT ABOVE KNEE AMPUTATION WITH POSS. ADVANCED SKIN THERAPY performed by Sydnee Baca MD at [facility] [place]    LEG SURGERY Right 5/14/2021    DEBRIDEMENT RIGHT ABOVE KNEE AMPUTATION performed by Ken Gaviria MD at [facility] [place]    LEG SURGERY Right 5/18/2021    DEBRIDEMENT ABOVE THE KNEE AMPUTATION , WITH WOUND VAC APPLICATION performed by Sydnee Baca MD at [facility] [place]    ME AMPUTATION THIGH THROUGH FEMUR RE-AMPUTATION Right 11/1/2018    AMPUTATION ABOVE KNEE RIGHT LEG performed by Sydnee Baca MD at [facility] [place]    ME I&D BELOW FASCIA FOOT 1 BURSAL SPACE Right 5/24/2018    RIGHT FOOT INCISION AND DRAINAGE WITH PARTIAL BONE RESECTION performed by Satinder Moraes DPM at [facility] OR    ME OFFICE/OUTPT VISIT,PROCEDURE ONLY Right 8/3/2018    INCISION AND DRAINAGE MULTIPLE AREAS RIGHT FOOT WITH DEBRIDEMENT SOFT TISSUE performed by Satinder Moraes DPM at [facility] [place]    RHINOPLASTY Right     leg      Current Medications:     Current Facility-Administered Medications:     0.9 % sodium chloride infusion, , IntraVENous, Continuous, Marlee Reyna, APRN - CNP    sodium chloride flush 0.9 % injection 5-40 mL, 5-40 mL, IntraVENous, 2 times per day, Marlee Reyna, APRN - CNP    sodium chloride flush 0.9 % injection 5-40 mL, 5-40 mL, IntraVENous, PRN, Marlee Reyna,

## 2024-05-08 NOTE — PROGRESS NOTES
FBS obtained - 69. Patient drowsy at times but able to take PO.  Patient given orange juice.    Family will be called bedside.

## 2024-05-08 NOTE — PROGRESS NOTES
Patient sitting up in bed with family bedside. [employer] [facility] called to transport home.    Patient does not want anything further to gisela.

## 2024-05-08 NOTE — ANESTHESIA POSTPROCEDURE EVALUATION
Department of Anesthesiology  Postprocedure Note    Patient: Florentin Stark  MRN: [id number]  YOB: 1955  Date of evaluation: 5/8/2024    Procedure Summary       Date: 05/08/24 Room / Location: [address]    Anesthesia Start: 1155 Anesthesia Stop: 1254    Procedure: DEBRIDEMENT LEFT LEG WOUND (Left: Leg Lower) Diagnosis:       Wound of left leg      (Wound of left leg [S81.802A])    Surgeons: Sydnee Baca MD Responsible Provider: Jerrell Gonzalez MD    Anesthesia Type: MAC, general ASA Status: 4            Anesthesia Type: No value filed.    Blaine Phase I: Blaine Score: 9    Blaine Phase II: Blaine Score: 9    Anesthesia Post Evaluation    Patient location during evaluation: PACU  Patient participation: complete - patient participated  Level of consciousness: awake and alert  Pain score: 2  Airway patency: patent  Nausea & Vomiting: no nausea and no vomiting  Cardiovascular status: blood pressure returned to baseline  Respiratory status: acceptable  Hydration status: euvolemic  Pain management: adequate    No notable events documented.

## 2024-05-09 NOTE — OP NOTE
Operative Note      Patient: Florentin Stark  YOB: 1955  MRN: [id number]    Date of Procedure: 5/8/2024    Pre-Op Diagnosis Codes:     * Wound of left leg [S81.802A]    Post-Op Diagnosis: Same       Procedure(s):  DEBRIDEMENT LEFT LEG WOUND  Application of micro matrix Flex, and site tell 3 layer 5 x 5 cm  Application of Unna boot  6 cm in width by 8 cm in length by 1 cm in depth    Surgeon(s):  Sydnee Baca MD    Assistant:   Resident: Isabelle Finney MD    Anesthesia: Monitor Anesthesia Care    Estimated Blood Loss (mL): less than 50     Complications: None    Specimens:   ID Type Source Tests Collected by Time Destination   1 : left lower leg wound Tissue Tissue CULTURE, ANAEROBIC, CULTURE, SURGICAL Sydnee Baca MD 5/8/2024 1236        Implants:  Implant Name Type Inv. Item Serial No.  Lot No. LRB No. Used Action   DRESSING WND 3 LAYR 5X5 CM MTRX CYTAL - [id number]  DRESSING WND 3 LAYR 5X5 CM MTRX CYTAL [id number] [employer]WD [id number] Left 1 Implanted   [id number] - [id number]   [id number] [employer] Who@ VICKIE 1263-[id number] Left 1 Implanted         Drains: * No LDAs found *    Findings:  Infection Present At Time Of Surgery (PATOS) (choose all levels that have infection present):  - Organ Space infection (below fascia) present as evidenced by osteomyelitis  Other Findings: no purulent fluid, known chronic osteomyelitis    DESCRIPTION OF PROCEDURE: The patient was identified and the procedure was confirmed.  The wound and surrounding area was cleaned, and draped.      Lidocaine gel soaked gauze was applied.  It was subsequently removed.  With the patient in supine position, the wound was debrided sharply of fibrotic, necrotic, and hyperkeratotic tissue, including a layer of surrounding healthy tissue using a curette for a total surface area of > 20 cm2.  The skin was excised through the level of the subcutaneous tissue, muscle and bone.    100%% of the wound

## 2024-05-11 LAB
MICROORGANISM SPEC CULT: NORMAL
SPECIMEN DESCRIPTION: NORMAL

## 2024-05-12 LAB
MICROORGANISM SPEC CULT: ABNORMAL
MICROORGANISM/AGENT SPEC: ABNORMAL
SPECIMEN DESCRIPTION: ABNORMAL

## 2024-05-13 ENCOUNTER — HOSPITAL ENCOUNTER (OUTPATIENT)
Dept: HYPERBARIC MEDICINE | Age: 69
Discharge: HOME | End: 2024-05-13

## 2024-05-13 DIAGNOSIS — I10 HYPERTENSION, UNSPECIFIED TYPE: ICD-10-CM

## 2024-05-13 DIAGNOSIS — E11.65 UNCONTROLLED TYPE 2 DIABETES MELLITUS WITH HYPERGLYCEMIA (HCC): ICD-10-CM

## 2024-05-13 NOTE — DISCHARGE INSTRUCTIONS
Visit Discharge/Physician Orders     Discharge condition: Stable     Assessment of pain at discharge: yes     Anesthetic used: 4% lidocaine      Discharge to: Home     Left via:Private automobile     Accompanied by: family     ECF/HHA: [facility]      Dressing Orders: LEFT LATERAL ANKLE: Cleanse wound with normal saline. COVER WITH DRAWTEX. APPLY Profore Change M-W (in [facility])-F.       IF WRAP FALLS 2 INCHES, OR IF PAIN INCREASES AND BECOMES INTOLERABLE,  REMOVE WRAP AND APPLY DOUBLE TUBIGRIP.  CALL WOUND CARE CENTER.       Treatment Orders: Eat a diet high in protein and vitamin C. Take a multiple vitamin daily unless contraindicated.      Elevate leg as much as possible.     Follow up with Dr. Brooks for foot care     STOP SMOKING!       Continue [facility] followup visit : 1 week _______________________  (Please note your next appointment above and if you are unable to keep, kindly give a 24 hour notice. Thank you.)     Physician signature:__________________________      If you experience any of the following, please call the Wound Care Center during business hours:     * Increase in Pain  * Temperature over 101  * Increase in drainage from your wound  * Drainage with a foul odor  * Bleeding  * Increase in swelling  * Need for compression bandage changes due to slippage, breakthrough drainage.     If you need medical attention outside of the business hours of the Wound Care Centers please contact your PCP or go to the nearest emergency room.

## 2024-05-13 NOTE — PROGRESS NOTES
Called patient to see about hyperbaric treatment for today, states that he is still not feeling well, having cold symptoms and that he will not be in today or tomorrow. States that he will try to return Wednesday if he is feeling better at that time.

## 2024-05-14 RX ORDER — INSULIN LISPRO 100 [IU]/ML
INJECTION, SOLUTION INTRAVENOUS; SUBCUTANEOUS
Qty: 15 ML | Refills: 10 | Status: SHIPPED | OUTPATIENT
Start: 2024-05-14

## 2024-05-14 RX ORDER — AMLODIPINE BESYLATE 10 MG/1
TABLET ORAL
Qty: 30 TABLET | Refills: 10 | Status: SHIPPED | OUTPATIENT
Start: 2024-05-14

## 2024-05-14 RX ORDER — CHLORTHALIDONE 25 MG/1
25 TABLET ORAL DAILY
Qty: 30 TABLET | Refills: 10 | Status: SHIPPED | OUTPATIENT
Start: 2024-05-14

## 2024-05-15 ENCOUNTER — HOSPITAL ENCOUNTER (OUTPATIENT)
Dept: HYPERBARIC MEDICINE | Age: 69
Discharge: HOME | End: 2024-05-15

## 2024-05-15 ENCOUNTER — HOSPITAL ENCOUNTER (OUTPATIENT)
Dept: WOUND CARE | Age: 69
Discharge: HOME | End: 2024-05-15
Attending: SURGERY
Payer: MEDICARE

## 2024-05-15 VITALS
TEMPERATURE: 98.4 F | RESPIRATION RATE: 18 BRPM | SYSTOLIC BLOOD PRESSURE: 137 MMHG | HEART RATE: 72 BPM | WEIGHT: 250 LBS | DIASTOLIC BLOOD PRESSURE: 65 MMHG | HEIGHT: 74 IN | BODY MASS INDEX: 32.08 KG/M2

## 2024-05-15 DIAGNOSIS — L97.224 DIABETIC ULCER OF LEFT CALF ASSOCIATED WITH TYPE 2 DIABETES MELLITUS, WITH NECROSIS OF BONE (HCC): ICD-10-CM

## 2024-05-15 DIAGNOSIS — M86.462 CHRONIC OSTEOMYELITIS OF LEFT FIBULA WITH DRAINING SINUS (HCC): Primary | ICD-10-CM

## 2024-05-15 DIAGNOSIS — E11.622 DIABETIC ULCER OF LEFT CALF ASSOCIATED WITH TYPE 2 DIABETES MELLITUS, WITH NECROSIS OF BONE (HCC): ICD-10-CM

## 2024-05-15 DIAGNOSIS — I70.243 ATHEROSCLEROSIS OF NATIVE ARTERY OF LEFT LEG WITH ULCERATION OF ANKLE (HCC): ICD-10-CM

## 2024-05-15 PROCEDURE — 11046 DBRDMT MUSC&/FSCA EA ADDL: CPT

## 2024-05-15 PROCEDURE — 11043 DBRDMT MUSC&/FSCA 1ST 20/<: CPT | Performed by: SURGERY

## 2024-05-15 PROCEDURE — 11046 DBRDMT MUSC&/FSCA EA ADDL: CPT | Performed by: SURGERY

## 2024-05-15 PROCEDURE — 11043 DBRDMT MUSC&/FSCA 1ST 20/<: CPT

## 2024-05-15 RX ORDER — BETAMETHASONE DIPROPIONATE 0.5 MG/G
CREAM TOPICAL ONCE
OUTPATIENT
Start: 2024-05-15 | End: 2024-05-15

## 2024-05-15 RX ORDER — LIDOCAINE 40 MG/G
CREAM TOPICAL ONCE
OUTPATIENT
Start: 2024-05-15 | End: 2024-05-15

## 2024-05-15 RX ORDER — GENTAMICIN SULFATE 1 MG/G
OINTMENT TOPICAL ONCE
OUTPATIENT
Start: 2024-05-15 | End: 2024-05-15

## 2024-05-15 RX ORDER — CEFDINIR 300 MG/1
300 CAPSULE ORAL 2 TIMES DAILY
Qty: 20 CAPSULE | Refills: 0 | Status: SHIPPED | OUTPATIENT
Start: 2024-05-15 | End: 2024-05-25

## 2024-05-15 RX ORDER — LIDOCAINE HYDROCHLORIDE 40 MG/ML
SOLUTION TOPICAL ONCE
OUTPATIENT
Start: 2024-05-15 | End: 2024-05-15

## 2024-05-15 RX ORDER — TRIAMCINOLONE ACETONIDE 1 MG/G
OINTMENT TOPICAL ONCE
OUTPATIENT
Start: 2024-05-15 | End: 2024-05-15

## 2024-05-15 RX ORDER — LIDOCAINE HYDROCHLORIDE 40 MG/ML
SOLUTION TOPICAL ONCE
Status: COMPLETED | OUTPATIENT
Start: 2024-05-15 | End: 2024-05-15

## 2024-05-15 RX ORDER — LIDOCAINE HYDROCHLORIDE 20 MG/ML
JELLY TOPICAL ONCE
OUTPATIENT
Start: 2024-05-15 | End: 2024-05-15

## 2024-05-15 RX ORDER — IBUPROFEN 200 MG
TABLET ORAL ONCE
OUTPATIENT
Start: 2024-05-15 | End: 2024-05-15

## 2024-05-15 RX ORDER — BACITRACIN ZINC 500 [USP'U]/G
OINTMENT TOPICAL ONCE
OUTPATIENT
Start: 2024-05-15 | End: 2024-05-15

## 2024-05-15 RX ORDER — BACITRACIN ZINC AND POLYMYXIN B SULFATE 500; 1000 [USP'U]/G; [USP'U]/G
OINTMENT TOPICAL ONCE
OUTPATIENT
Start: 2024-05-15 | End: 2024-05-15

## 2024-05-15 RX ORDER — SODIUM CHLOR/HYPOCHLOROUS ACID 0.033 %
SOLUTION, IRRIGATION IRRIGATION ONCE
OUTPATIENT
Start: 2024-05-15 | End: 2024-05-15

## 2024-05-15 RX ORDER — CLOBETASOL PROPIONATE 0.5 MG/G
OINTMENT TOPICAL ONCE
OUTPATIENT
Start: 2024-05-15 | End: 2024-05-15

## 2024-05-15 RX ORDER — LIDOCAINE 50 MG/G
OINTMENT TOPICAL ONCE
OUTPATIENT
Start: 2024-05-15 | End: 2024-05-15

## 2024-05-15 RX ADMIN — LIDOCAINE HYDROCHLORIDE 5 ML: 40 SOLUTION TOPICAL at 13:33

## 2024-05-15 ASSESSMENT — PAIN SCALES - GENERAL: PAINLEVEL_OUTOF10: 8

## 2024-05-15 ASSESSMENT — PAIN DESCRIPTION - PAIN TYPE: TYPE: CHRONIC PAIN

## 2024-05-15 ASSESSMENT — PAIN DESCRIPTION - FREQUENCY: FREQUENCY: INTERMITTENT

## 2024-05-15 ASSESSMENT — PAIN DESCRIPTION - DESCRIPTORS: DESCRIPTORS: ACHING

## 2024-05-15 ASSESSMENT — PAIN DESCRIPTION - ORIENTATION: ORIENTATION: LEFT

## 2024-05-15 ASSESSMENT — PAIN DESCRIPTION - ONSET: ONSET: ON-GOING

## 2024-05-15 NOTE — PROGRESS NOTES
Right 5/18/2021    DEBRIDEMENT ABOVE THE KNEE AMPUTATION , WITH WOUND VAC APPLICATION performed by Sydnee Baca MD at [facility] [place]    LEG SURGERY Left 5/8/2024    DEBRIDEMENT LEFT LEG WOUND performed by Sydnee Baca MD at [facility] [place]    CA AMPUTATION THIGH THROUGH FEMUR RE-AMPUTATION Right 11/1/2018    AMPUTATION ABOVE KNEE RIGHT LEG performed by Sydnee Baca MD at [facility] [place]    CA I&D BELOW FASCIA FOOT 1 BURSAL SPACE Right 5/24/2018    RIGHT FOOT INCISION AND DRAINAGE WITH PARTIAL BONE RESECTION performed by Satinder Moraes DPM at [facility] OR    CA OFFICE/OUTPT VISIT,PROCEDURE ONLY Right 8/3/2018    INCISION AND DRAINAGE MULTIPLE AREAS RIGHT FOOT WITH DEBRIDEMENT SOFT TISSUE performed by Satinder Moraes DPM at [facility] [place]    RHINOPLASTY Right     leg      Family History   Problem Relation Age of Onset    Cancer Mother     Diabetes Father     Heart Failure Father     Hypertension Father     Cancer Sister      Social History     Tobacco Use    Smoking status: Every Day     Current packs/day: 0.25     Average packs/day: 0.3 packs/day for 7.0 years (1.8 ttl pk-yrs)     Types: Cigarettes    Smokeless tobacco: Never   Vaping Use    Vaping Use: Never used   Substance Use Topics    Alcohol use: No    Drug use: No     No Known Allergies  Current Outpatient Medications on File Prior to Encounter   Medication Sig Dispense Refill    insulin lispro, 1 Unit Dial, (HUMALOG/ADMELOG) 100 UNIT/ML SOPN CHECK BLOOD SUGAR 3-4 TIME DAILY INJECT SUBCUTANEOUSLY :0U; 121-150:2U;151-200:5U;201-250:9U 251-300:12U;301-350:15U;351-400: 18U;>400:18U, CALL MD MAX 60UDAY 15 mL 10    metoprolol tartrate (LOPRESSOR) 25 MG tablet TAKE ONE (1) TABLET BY MOUTH TWICE DAILY 60 tablet 10    amLODIPine (NORVASC) 10 MG tablet TAKE 1 TABLET BY MOUTH ONCE DAILY 30 tablet 10    chlorthalidone (HYGROTON) 25 MG tablet TAKE 1 TABLET BY MOUTH DAILY 30 tablet 10    oxyCODONE-acetaminophen (PERCOCET) 7.5-325 MG per tablet Take 1 tablet by

## 2024-05-20 ENCOUNTER — HOSPITAL ENCOUNTER (OUTPATIENT)
Dept: HYPERBARIC MEDICINE | Age: 69
Discharge: HOME | End: 2024-05-20
Payer: MEDICARE

## 2024-05-20 VITALS
SYSTOLIC BLOOD PRESSURE: 151 MMHG | RESPIRATION RATE: 19 BRPM | TEMPERATURE: 97.8 F | HEART RATE: 72 BPM | DIASTOLIC BLOOD PRESSURE: 68 MMHG

## 2024-05-20 DIAGNOSIS — E11.622 DIABETIC ULCER OF LEFT CALF ASSOCIATED WITH TYPE 2 DIABETES MELLITUS, WITH NECROSIS OF BONE (HCC): Primary | ICD-10-CM

## 2024-05-20 DIAGNOSIS — L97.224 DIABETIC ULCER OF LEFT CALF ASSOCIATED WITH TYPE 2 DIABETES MELLITUS, WITH NECROSIS OF BONE (HCC): Primary | ICD-10-CM

## 2024-05-20 DIAGNOSIS — M86.462 CHRONIC OSTEOMYELITIS OF LEFT FIBULA WITH DRAINING SINUS (HCC): ICD-10-CM

## 2024-05-20 LAB
GLUCOSE BLD-MCNC: 142 MG/DL (ref 74–99)
GLUCOSE BLD-MCNC: 174 MG/DL (ref 74–99)

## 2024-05-20 PROCEDURE — G0277 HBOT, FULL BODY CHAMBER, 30M: HCPCS

## 2024-05-20 PROCEDURE — 99183 HYPERBARIC OXYGEN THERAPY: CPT | Performed by: SURGERY

## 2024-05-20 PROCEDURE — 82962 GLUCOSE BLOOD TEST: CPT

## 2024-05-20 NOTE — PROGRESS NOTES
[facility]  Hyperbaric Oxygen Therapy   Progress Note    NAME: Florentin Stark  MEDICAL RECORD NUMBER:  [id number]  AGE: 68 y.o.   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  See doc flowsheets for documentation.  Objective      Please see lab results for finger stick glucose results  Pre treatment Vital Signs       Temp: 98 °F (36.7 °C)     Pulse: 82     Respirations: 24     BP: (!) 148/66     Post treatment Vital Signs  Temp: 97.8 °F (36.6 °C)  Pulse: 72  Respirations: 19  BP: (!) 151/68  Assessment      Physical Exam:  General Appearance:  alert and oriented to person, place and time, well-developed and well-nourished, in no acute distress  ENT:  tympanic membranes intact bilaterally  Pulmonary/Chest:  clear to auscultation bilaterally- no wheezes, rales or rhonchi, normal air movement, no respiratory distress  Cardiovascular:  regular rate and rhythm  Chamber #: [id number]  Treatment Start Time: 1003     Pressure Reached Time: 1015  ALEXUS : 2  Number of Air Breaks:  Treatment Status: No Air break     Decompression Time: 1145   Treatment End Time: 1153  Length of Treatment: 90 Minutes  Symptoms Noted During Treatment: None  Total Treatment Time (min): 110    Adverse Event: no    I

## 2024-05-20 NOTE — DISCHARGE INSTRUCTIONS
walking or numbness and tingling of your arms and legs.  [x]If you are on prescription medicines from your personal doctor, you may continue to take these as directed.      Hyperbaric Medicine Department Information:    If you have questions or develop any of the symptoms mentioned, please contact the Hyperbaric Department at [phone] MONDAY - FRIDAY 8:30 am - 4:30 pm.  If you need help outside these hours and cannot wait until we are again available, contact your PCP or go to the hospital emergency room.     Additional Instructions:        Patient Signature:_______________________Date:_________Time:________    [] Patient unable to sign Discharge Instructions given to ECF/Transportation/POA    The information contained in the After Visit Summary has been reviewed with me, the patient and/or responsible adult, by my health care provider(s).  I had the opportunity to ask questions regarding this information.  I have elected to receive;  []  After Visit Summary  [x]  Comprehensive Discharge Instruction    AVS discharge instructions were reviewed with the client he/she declined a printed copy.   Nurse Signature:_______________________Date:_________Time:_________    Electronically signed by Vincent Pryor RN on 5/20/2024 at 12:23 PM

## 2024-05-20 NOTE — DISCHARGE INSTRUCTIONS
Visit Discharge/Physician Orders     Discharge condition: Stable     Assessment of pain at discharge: yes     Anesthetic used: 4% lidocaine      Discharge to: Home     Left via:Private automobile     Accompanied by: family     ECF/HHA: [facility]      Dressing Orders: LEFT LATERAL ANKLE: Cleanse wound with normal saline. COVER WITH DRAWTEX. APPLY Profore Change M-W (in [facility])-F.       IF WRAP FALLS 2 INCHES, OR IF PAIN INCREASES AND BECOMES INTOLERABLE,  REMOVE WRAP AND APPLY DOUBLE TUBIGRIP.  CALL WOUND CARE CENTER.       Treatment Orders: Eat a diet high in protein and vitamin C. Take a multiple vitamin daily unless contraindicated.      Elevate leg as much as possible.     Follow up with Dr. Brooks for foot care     STOP SMOKING!       Continue [facility] followup visit : 1 week _______________________  (Please note your next appointment above and if you are unable to keep, kindly give a 24 hour notice. Thank you.)     Physician signature:__________________________      If you experience any of the following, please call the Wound Care Center during business hours:     * Increase in Pain  * Temperature over 101  * Increase in drainage from your wound  * Drainage with a foul odor  * Bleeding  * Increase in swelling  * Need for compression bandage changes due to slippage, breakthrough drainage.     If you need medical attention outside of the business hours of the Wound Care Centers please contact your PCP or go to the nearest emergency room.

## 2024-05-22 ENCOUNTER — HOSPITAL ENCOUNTER (OUTPATIENT)
Dept: HYPERBARIC MEDICINE | Age: 69
Discharge: HOME | End: 2024-05-22

## 2024-05-22 ENCOUNTER — HOSPITAL ENCOUNTER (OUTPATIENT)
Dept: WOUND CARE | Age: 69
Discharge: HOME | End: 2024-05-22
Attending: SURGERY

## 2024-05-23 ENCOUNTER — HOSPITAL ENCOUNTER (OUTPATIENT)
Dept: HYPERBARIC MEDICINE | Age: 69
Discharge: HOME | End: 2024-05-23

## 2024-05-24 ENCOUNTER — HOSPITAL ENCOUNTER (OUTPATIENT)
Dept: HYPERBARIC MEDICINE | Age: 69
Discharge: HOME | End: 2024-05-24

## 2024-05-27 NOTE — CARE COORDINATION
Social Work Discharge/Planning:    Per prior Vascular note, plan for discharge in a day or two and do not anticipate needing wound vac for home. SW met with patient for follow up transition of care discussion, plan remains to return home with [facility] and aid services through the waiver program.     [address]I spoke with liaison Silvia with [facility] to give a patient update. NIKKI/CM to follow.     [facility]  [phone] denies pain/discomfort (Rating = 0)

## 2024-05-28 ENCOUNTER — HOSPITAL ENCOUNTER (OUTPATIENT)
Dept: HYPERBARIC MEDICINE | Age: 69
Discharge: HOME | End: 2024-05-28

## 2024-05-28 NOTE — DISCHARGE INSTRUCTIONS
Visit Discharge/Physician Orders     Discharge condition: Stable     Assessment of pain at discharge: yes     Anesthetic used: 4% lidocaine      Discharge to: Home     Left via:Private automobile     Accompanied by: family     ECF/HHA: [facility]      Dressing Orders: LEFT LATERAL ANKLE: Cleanse wound with normal saline. COVER WITH DRAWTEX. APPLY Profore Change M-W (in [facility])-F.       IF WRAP FALLS 2 INCHES, OR IF PAIN INCREASES AND BECOMES INTOLERABLE,  REMOVE WRAP AND APPLY DOUBLE TUBIGRIP.  CALL WOUND CARE CENTER.       Treatment Orders: Eat a diet high in protein and vitamin C. Take a multiple vitamin daily unless contraindicated.      Elevate leg as much as possible.     Follow up with Dr. Brooks for foot care     STOP SMOKING!       Continue [facility] followup visit : 1 week _______________________  (Please note your next appointment above and if you are unable to keep, kindly give a 24 hour notice. Thank you.)     Physician signature:__________________________      If you experience any of the following, please call the Wound Care Center during business hours:     * Increase in Pain  * Temperature over 101  * Increase in drainage from your wound  * Drainage with a foul odor  * Bleeding  * Increase in swelling  * Need for compression bandage changes due to slippage, breakthrough drainage.     If you need medical attention outside of the business hours of the Wound Care Centers please contact your PCP or go to the nearest emergency room.

## 2024-05-29 ENCOUNTER — HOSPITAL ENCOUNTER (OUTPATIENT)
Dept: WOUND CARE | Age: 69
Discharge: HOME | End: 2024-05-29
Attending: SURGERY
Payer: MEDICARE

## 2024-05-29 VITALS
HEIGHT: 74 IN | WEIGHT: 250 LBS | SYSTOLIC BLOOD PRESSURE: 150 MMHG | HEART RATE: 77 BPM | TEMPERATURE: 96.7 F | BODY MASS INDEX: 32.08 KG/M2 | DIASTOLIC BLOOD PRESSURE: 71 MMHG | RESPIRATION RATE: 20 BRPM

## 2024-05-29 DIAGNOSIS — M86.462 CHRONIC OSTEOMYELITIS OF LEFT FIBULA WITH DRAINING SINUS (HCC): Primary | ICD-10-CM

## 2024-05-29 DIAGNOSIS — L97.224 DIABETIC ULCER OF LEFT CALF ASSOCIATED WITH TYPE 2 DIABETES MELLITUS, WITH NECROSIS OF BONE (HCC): ICD-10-CM

## 2024-05-29 DIAGNOSIS — I70.243 ATHEROSCLEROSIS OF NATIVE ARTERY OF LEFT LEG WITH ULCERATION OF ANKLE (HCC): ICD-10-CM

## 2024-05-29 DIAGNOSIS — E11.622 DIABETIC ULCER OF LEFT CALF ASSOCIATED WITH TYPE 2 DIABETES MELLITUS, WITH NECROSIS OF BONE (HCC): ICD-10-CM

## 2024-05-29 PROCEDURE — 11046 DBRDMT MUSC&/FSCA EA ADDL: CPT

## 2024-05-29 PROCEDURE — 11046 DBRDMT MUSC&/FSCA EA ADDL: CPT | Performed by: SURGERY

## 2024-05-29 PROCEDURE — 11043 DBRDMT MUSC&/FSCA 1ST 20/<: CPT | Performed by: SURGERY

## 2024-05-29 PROCEDURE — 11042 DBRDMT SUBQ TIS 1ST 20SQCM/<: CPT

## 2024-05-29 PROCEDURE — 11045 DBRDMT SUBQ TISS EACH ADDL: CPT

## 2024-05-29 PROCEDURE — 11043 DBRDMT MUSC&/FSCA 1ST 20/<: CPT

## 2024-05-29 RX ORDER — LIDOCAINE HYDROCHLORIDE 20 MG/ML
JELLY TOPICAL ONCE
OUTPATIENT
Start: 2024-05-29 | End: 2024-05-29

## 2024-05-29 RX ORDER — LIDOCAINE 50 MG/G
OINTMENT TOPICAL ONCE
OUTPATIENT
Start: 2024-05-29 | End: 2024-05-29

## 2024-05-29 RX ORDER — IBUPROFEN 200 MG
TABLET ORAL ONCE
OUTPATIENT
Start: 2024-05-29 | End: 2024-05-29

## 2024-05-29 RX ORDER — GENTAMICIN SULFATE 1 MG/G
OINTMENT TOPICAL ONCE
OUTPATIENT
Start: 2024-05-29 | End: 2024-05-29

## 2024-05-29 RX ORDER — LIDOCAINE HYDROCHLORIDE 40 MG/ML
SOLUTION TOPICAL ONCE
Status: COMPLETED | OUTPATIENT
Start: 2024-05-29 | End: 2024-05-29

## 2024-05-29 RX ORDER — CLOBETASOL PROPIONATE 0.5 MG/G
OINTMENT TOPICAL ONCE
OUTPATIENT
Start: 2024-05-29 | End: 2024-05-29

## 2024-05-29 RX ORDER — BACITRACIN ZINC 500 [USP'U]/G
OINTMENT TOPICAL ONCE
OUTPATIENT
Start: 2024-05-29 | End: 2024-05-29

## 2024-05-29 RX ORDER — TRIAMCINOLONE ACETONIDE 1 MG/G
OINTMENT TOPICAL ONCE
OUTPATIENT
Start: 2024-05-29 | End: 2024-05-29

## 2024-05-29 RX ORDER — LIDOCAINE 40 MG/G
CREAM TOPICAL ONCE
OUTPATIENT
Start: 2024-05-29 | End: 2024-05-29

## 2024-05-29 RX ORDER — LIDOCAINE HYDROCHLORIDE 40 MG/ML
SOLUTION TOPICAL ONCE
OUTPATIENT
Start: 2024-05-29 | End: 2024-05-29

## 2024-05-29 RX ORDER — BACITRACIN ZINC AND POLYMYXIN B SULFATE 500; 1000 [USP'U]/G; [USP'U]/G
OINTMENT TOPICAL ONCE
OUTPATIENT
Start: 2024-05-29 | End: 2024-05-29

## 2024-05-29 RX ORDER — SODIUM CHLOR/HYPOCHLOROUS ACID 0.033 %
SOLUTION, IRRIGATION IRRIGATION ONCE
OUTPATIENT
Start: 2024-05-29 | End: 2024-05-29

## 2024-05-29 RX ORDER — BETAMETHASONE DIPROPIONATE 0.5 MG/G
CREAM TOPICAL ONCE
OUTPATIENT
Start: 2024-05-29 | End: 2024-05-29

## 2024-05-29 RX ADMIN — LIDOCAINE HYDROCHLORIDE 10 ML: 40 SOLUTION TOPICAL at 11:45

## 2024-05-29 NOTE — PROGRESS NOTES
Vitamin D deficiency     Wound infection 04/16/2020     Past Surgical History:   Procedure Laterality Date    AMPUTATION ABOVE KNEE Right 4/28/2020    DEBRIDEMENT OF AMPUTATION RIGHT ABOVE KNEE performed by Sydnee Baca MD at [facility] [place]    AMPUTATION ABOVE KNEE Right 4/30/2020    DEBRIDEMENT OF AMPUTATION RIGHT ABOVE KNEE,  POSS. ABOVE KNEE REVISION, POSS. CLOSURE, POSS.WOUND VAC -- DRS. CASH / CAROL TO LOOK IN performed by Sydnee Baca MD at [facility] [place]    FEMORAL BYPASS      Right - [place], [place]    FEMORAL ENDARTERECTOMY Right 3/20/2020    RIGHT LOWER EXTREMITY THROMBECTOMY, POSSIBLE ANGIOGRAM, POSSIBLE INTERVENTION, POSSIBLE BYPASS. performed by Sydnee Baca MD at [facility] [place] [facility] [place]    INVASIVE VASCULAR N/A 4/9/2024    Aortagram abdominal performed by Sydnee Baca MD at [facility] [place] CARDIAC CATH LAB    INVASIVE VASCULAR N/A 4/9/2024    Angioplasty peripheral artery performed by Sydnee Baca MD at [facility] [place] CARDIAC CATH LAB    LEG SURGERY Right 4/17/2020    RIGHT LEG DEBRIDEMENT INCISION AND DRAINAGE performed by Ken Gaviria MD at [facility] [place] [facility] [place]    LEG SURGERY Right 4/20/2020    RIGHT THIGH WOUND DRESSING CHANGE; DEBRIDEMENT, removal of muscle performed by Ken Gaviria MD at [facility] [place]    LEG SURGERY Right 4/21/2020    RIGHT THIGH WOUND DRESSING CHANGE POSSIBLE  DEBRIDEMENT - NEEDS DRS. CASH / JANETTE TO SEE performed by Wes Pretty MD at [facility] [place]    LEG SURGERY Right 4/23/2020    RIGHT THIGH WOUND DRESSING CHANGE and DEBRIDEMENT performed by Sydnee Baca MD at [facility] [place]    LEG SURGERY Right 10/15/2020    DEBRIDEMENT RIGHT ABOVE KNEE AMPUTATION POSS. REVISION POSS. WOUND VAC performed by Sydnee Baca MD at [facility] [place]    LEG SURGERY Right 12/17/2020    DEBRIDEMENT  RIGHT ABOVE KNEE AMPUTATION WITH POSS. ADVANCED SKIN THERAPY performed by Sydnee Baca MD at [facility] [place]    LEG SURGERY Right 5/14/2021    DEBRIDEMENT RIGHT ABOVE KNEE AMPUTATION performed

## 2024-06-04 NOTE — DISCHARGE INSTRUCTIONS
Visit Discharge/Physician Orders     Discharge condition: Stable     Assessment of pain at discharge: yes     Anesthetic used: 4% lidocaine      Discharge to: Home     Left via:Private automobile     Accompanied by: family     ECF/HHA: [facility]      Dressing Orders: LEFT LATERAL ANKLE: Cleanse wound with normal saline. COVER WITH DRAWTEX. APPLY Profore Change M-W (in [facility])-F.      IN CLINIC 6/5/24: LEFT LATERAL ANKLE: Cleanse with normal saline, apply Drawtex, apply Kerlix & Coban.      IF WRAP FALLS 2 INCHES, OR IF PAIN INCREASES AND BECOMES INTOLERABLE,  REMOVE WRAP AND APPLY DOUBLE TUBIGRIP.  CALL WOUND CARE CENTER.       Treatment Orders: Eat a diet high in protein and vitamin C. Take a multiple vitamin daily unless contraindicated.      Elevate leg as much as possible.     Follow up with Dr. Brooks for foot care     STOP SMOKING!       Continue [facility] followup visit : 1 week _______________________  (Please note your next appointment above and if you are unable to keep, kindly give a 24 hour notice. Thank you.)     Physician signature:__________________________      If you experience any of the following, please call the Wound Care Center during business hours:     * Increase in Pain  * Temperature over 101  * Increase in drainage from your wound  * Drainage with a foul odor  * Bleeding  * Increase in swelling  * Need for compression bandage changes due to slippage, breakthrough drainage.     If you need medical attention outside of the business hours of the Wound Care Centers please contact your PCP or go to the nearest emergency room.

## 2024-06-05 ENCOUNTER — HOSPITAL ENCOUNTER (OUTPATIENT)
Dept: WOUND CARE | Age: 69
Discharge: HOME | End: 2024-06-05
Attending: SURGERY
Payer: MEDICARE

## 2024-06-05 VITALS
HEIGHT: 74 IN | SYSTOLIC BLOOD PRESSURE: 131 MMHG | BODY MASS INDEX: 32.08 KG/M2 | WEIGHT: 250 LBS | DIASTOLIC BLOOD PRESSURE: 67 MMHG | RESPIRATION RATE: 18 BRPM | HEART RATE: 98 BPM | TEMPERATURE: 98 F

## 2024-06-05 DIAGNOSIS — M86.462 CHRONIC OSTEOMYELITIS OF LEFT FIBULA WITH DRAINING SINUS (HCC): Primary | ICD-10-CM

## 2024-06-05 DIAGNOSIS — I70.243 ATHEROSCLEROSIS OF NATIVE ARTERY OF LEFT LEG WITH ULCERATION OF ANKLE (HCC): ICD-10-CM

## 2024-06-05 DIAGNOSIS — L97.224 DIABETIC ULCER OF LEFT CALF ASSOCIATED WITH TYPE 2 DIABETES MELLITUS, WITH NECROSIS OF BONE (HCC): ICD-10-CM

## 2024-06-05 DIAGNOSIS — E11.622 DIABETIC ULCER OF LEFT CALF ASSOCIATED WITH TYPE 2 DIABETES MELLITUS, WITH NECROSIS OF BONE (HCC): ICD-10-CM

## 2024-06-05 PROCEDURE — 11046 DBRDMT MUSC&/FSCA EA ADDL: CPT

## 2024-06-05 PROCEDURE — 11043 DBRDMT MUSC&/FSCA 1ST 20/<: CPT

## 2024-06-05 PROCEDURE — 11046 DBRDMT MUSC&/FSCA EA ADDL: CPT | Performed by: SURGERY

## 2024-06-05 PROCEDURE — 11043 DBRDMT MUSC&/FSCA 1ST 20/<: CPT | Performed by: SURGERY

## 2024-06-05 RX ORDER — GENTAMICIN SULFATE 1 MG/G
OINTMENT TOPICAL ONCE
OUTPATIENT
Start: 2024-06-05 | End: 2024-06-05

## 2024-06-05 RX ORDER — SODIUM CHLOR/HYPOCHLOROUS ACID 0.033 %
SOLUTION, IRRIGATION IRRIGATION ONCE
OUTPATIENT
Start: 2024-06-05 | End: 2024-06-05

## 2024-06-05 RX ORDER — CLOBETASOL PROPIONATE 0.5 MG/G
OINTMENT TOPICAL ONCE
OUTPATIENT
Start: 2024-06-05 | End: 2024-06-05

## 2024-06-05 RX ORDER — LIDOCAINE 40 MG/G
CREAM TOPICAL ONCE
OUTPATIENT
Start: 2024-06-05 | End: 2024-06-05

## 2024-06-05 RX ORDER — LIDOCAINE 50 MG/G
OINTMENT TOPICAL ONCE
OUTPATIENT
Start: 2024-06-05 | End: 2024-06-05

## 2024-06-05 RX ORDER — BACITRACIN ZINC 500 [USP'U]/G
OINTMENT TOPICAL ONCE
OUTPATIENT
Start: 2024-06-05 | End: 2024-06-05

## 2024-06-05 RX ORDER — LIDOCAINE HYDROCHLORIDE 20 MG/ML
JELLY TOPICAL ONCE
OUTPATIENT
Start: 2024-06-05 | End: 2024-06-05

## 2024-06-05 RX ORDER — BACITRACIN ZINC AND POLYMYXIN B SULFATE 500; 1000 [USP'U]/G; [USP'U]/G
OINTMENT TOPICAL ONCE
OUTPATIENT
Start: 2024-06-05 | End: 2024-06-05

## 2024-06-05 RX ORDER — BETAMETHASONE DIPROPIONATE 0.5 MG/G
CREAM TOPICAL ONCE
OUTPATIENT
Start: 2024-06-05 | End: 2024-06-05

## 2024-06-05 RX ORDER — LIDOCAINE HYDROCHLORIDE 40 MG/ML
SOLUTION TOPICAL ONCE
OUTPATIENT
Start: 2024-06-05 | End: 2024-06-05

## 2024-06-05 RX ORDER — TRIAMCINOLONE ACETONIDE 1 MG/G
OINTMENT TOPICAL ONCE
OUTPATIENT
Start: 2024-06-05 | End: 2024-06-05

## 2024-06-05 RX ORDER — IBUPROFEN 200 MG
TABLET ORAL ONCE
OUTPATIENT
Start: 2024-06-05 | End: 2024-06-05

## 2024-06-05 RX ORDER — LIDOCAINE HYDROCHLORIDE 40 MG/ML
SOLUTION TOPICAL ONCE
Status: COMPLETED | OUTPATIENT
Start: 2024-06-05 | End: 2024-06-05

## 2024-06-05 RX ADMIN — LIDOCAINE HYDROCHLORIDE 10 ML: 40 SOLUTION TOPICAL at 11:10

## 2024-06-05 NOTE — PLAN OF CARE
Problem: Chronic Conditions and Co-morbidities  Goal: Patient's chronic conditions and co-morbidity symptoms are monitored and maintained or improved  Outcome: Progressing     Problem: Cognitive:  Goal: Knowledge of wound care  Description: Knowledge of wound care  Outcome: Progressing  Goal: Understands risk factors for wounds  Description: Understands risk factors for wounds  Outcome: Progressing     Problem: Wound:  Goal: Will show signs of wound healing; wound closure and no evidence of infection  Description: Will show signs of wound healing; wound closure and no evidence of infection  Outcome: Progressing     Problem: Blood Glucose:  Goal: Ability to maintain appropriate glucose levels will improve  Description: Ability to maintain appropriate glucose levels will improve  Outcome: Progressing     Problem: Compression therapy:  Goal: Will be free from complications associated with compression therapy  Description: Will be free from complications associated with compression therapy  Outcome: Progressing

## 2024-06-05 NOTE — PROGRESS NOTES
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[facility]) 01/16/2020    Acute kidney injury ([facility]) 05/18/2020    AKA stump complication ([facility]) 05/12/2021    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 ([facility]) 05/22/2018    Atherosclerosis of native artery of left leg with ulceration of ankle ([facility]) 09/29/2023    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 ([facility]) 05/21/2018    Cellulitis, scrotum 03/20/2020    Coagulopathy ([facility]) 05/21/2018    Critical lower limb ischemia ([facility]) 03/20/2020    Diabetes mellitus ([facility])     Diabetic foot infection ([facility]) 09/26/2023    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, with necrosis of bone ([facility]) 11/15/2023    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 2 diabetes mellitus, with fat layer exposed ([facility]) 05/22/2018    Disruption of closure of muscle or muscle flap, sequela 08/26/2020    DVT, lower extremity ([facility])     right leg     Encounter for dental examination and cleaning with abnormal findings 04/02/2018    Gas gangrene of thigh ([facility]) 03/20/2020    History of DVT (deep vein thrombosis) 07/31/2018    Hyperglycemia due to type 2 diabetes mellitus ([facility]) 03/20/2020    Hyperlipidemia     Hypertension     Ischemic ulcer of right thigh with fat layer exposed ([facility])     Ischemic ulcer of right thigh with necrosis of muscle ([facility])     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

## 2024-06-06 ENCOUNTER — OFFICE VISIT (OUTPATIENT)
Dept: PRIMARY CARE CLINIC | Age: 69
End: 2024-06-06
Payer: MEDICARE

## 2024-06-06 VITALS
OXYGEN SATURATION: 96 % | SYSTOLIC BLOOD PRESSURE: 150 MMHG | TEMPERATURE: 99.3 F | BODY MASS INDEX: 32.08 KG/M2 | HEART RATE: 78 BPM | DIASTOLIC BLOOD PRESSURE: 66 MMHG | HEIGHT: 74 IN

## 2024-06-06 DIAGNOSIS — R09.02 HYPOXIA: ICD-10-CM

## 2024-06-06 DIAGNOSIS — R79.89 OTHER SPECIFIED ABNORMAL FINDINGS OF BLOOD CHEMISTRY: ICD-10-CM

## 2024-06-06 DIAGNOSIS — G89.18 POSTOPERATIVE PAIN: ICD-10-CM

## 2024-06-06 DIAGNOSIS — I11.0 HYPERTENSIVE HEART DISEASE WITH HEART FAILURE (HCC): ICD-10-CM

## 2024-06-06 DIAGNOSIS — I50.9 CONGESTIVE HEART FAILURE, UNSPECIFIED HF CHRONICITY, UNSPECIFIED HEART FAILURE TYPE (HCC): ICD-10-CM

## 2024-06-06 DIAGNOSIS — R68.89 OTHER GENERAL SYMPTOMS AND SIGNS: ICD-10-CM

## 2024-06-06 DIAGNOSIS — G89.4 CHRONIC PAIN SYNDROME: ICD-10-CM

## 2024-06-06 DIAGNOSIS — R09.02 HYPOXIA: Primary | ICD-10-CM

## 2024-06-06 LAB
ALBUMIN: 3.6 G/DL (ref 3.5–5.2)
ALP BLD-CCNC: 118 U/L (ref 40–129)
ALT SERPL-CCNC: 7 U/L (ref 0–40)
ANION GAP SERPL CALCULATED.3IONS-SCNC: 12 MMOL/L (ref 7–16)
AST SERPL-CCNC: 12 U/L (ref 0–39)
BASOPHILS ABSOLUTE: 0.07 K/UL (ref 0–0.2)
BASOPHILS RELATIVE PERCENT: 1 % (ref 0–2)
BILIRUB SERPL-MCNC: 0.3 MG/DL (ref 0–1.2)
BILIRUBIN DIRECT: <0.2 MG/DL (ref 0–0.3)
BILIRUBIN, INDIRECT: ABNORMAL MG/DL (ref 0–1)
BUN BLDV-MCNC: 31 MG/DL (ref 6–23)
CALCIUM SERPL-MCNC: 9.3 MG/DL (ref 8.6–10.2)
CHLORIDE BLD-SCNC: 104 MMOL/L (ref 98–107)
CO2: 23 MMOL/L (ref 22–29)
CREAT SERPL-MCNC: 2 MG/DL (ref 0.7–1.2)
EOSINOPHILS ABSOLUTE: 0.16 K/UL (ref 0.05–0.5)
EOSINOPHILS RELATIVE PERCENT: 2 % (ref 0–6)
GFR, ESTIMATED: 35 ML/MIN/1.73M2
GLUCOSE BLD-MCNC: 110 MG/DL (ref 74–99)
HCT VFR BLD CALC: 36.9 % (ref 37–54)
HEMOGLOBIN: 10.9 G/DL (ref 12.5–16.5)
IMMATURE GRANULOCYTES %: 0 % (ref 0–5)
IMMATURE GRANULOCYTES ABSOLUTE: 0.03 K/UL (ref 0–0.58)
LYMPHOCYTES ABSOLUTE: 1.74 K/UL (ref 1.5–4)
LYMPHOCYTES RELATIVE PERCENT: 17 % (ref 20–42)
MCH RBC QN AUTO: 25.2 PG (ref 26–35)
MCHC RBC AUTO-ENTMCNC: 29.5 G/DL (ref 32–34.5)
MCV RBC AUTO: 85.2 FL (ref 80–99.9)
MONOCYTES ABSOLUTE: 0.92 K/UL (ref 0.1–0.95)
MONOCYTES RELATIVE PERCENT: 9 % (ref 2–12)
NEUTROPHILS ABSOLUTE: 7.59 K/UL (ref 1.8–7.3)
NEUTROPHILS RELATIVE PERCENT: 72 % (ref 43–80)
PDW BLD-RTO: 18.1 % (ref 11.5–15)
PLATELET # BLD: 278 K/UL (ref 130–450)
PMV BLD AUTO: 11.5 FL (ref 7–12)
POTASSIUM SERPL-SCNC: 4.3 MMOL/L (ref 3.5–5)
PRO-BNP: 1578 PG/ML (ref 0–125)
RBC # BLD: 4.33 M/UL (ref 3.8–5.8)
SODIUM BLD-SCNC: 139 MMOL/L (ref 132–146)
TOTAL PROTEIN: 7.2 G/DL (ref 6.4–8.3)
TROPONIN, HIGH SENSITIVITY: 131 NG/L (ref 0–11)
WBC # BLD: 10.5 K/UL (ref 4.5–11.5)

## 2024-06-06 PROCEDURE — 3077F SYST BP >= 140 MM HG: CPT | Performed by: FAMILY MEDICINE

## 2024-06-06 PROCEDURE — G8427 DOCREV CUR MEDS BY ELIG CLIN: HCPCS | Performed by: FAMILY MEDICINE

## 2024-06-06 PROCEDURE — 3017F COLORECTAL CA SCREEN DOC REV: CPT | Performed by: FAMILY MEDICINE

## 2024-06-06 PROCEDURE — G8417 CALC BMI ABV UP PARAM F/U: HCPCS | Performed by: FAMILY MEDICINE

## 2024-06-06 PROCEDURE — 3078F DIAST BP <80 MM HG: CPT | Performed by: FAMILY MEDICINE

## 2024-06-06 PROCEDURE — G2211 COMPLEX E/M VISIT ADD ON: HCPCS | Performed by: FAMILY MEDICINE

## 2024-06-06 PROCEDURE — 99214 OFFICE O/P EST MOD 30 MIN: CPT | Performed by: FAMILY MEDICINE

## 2024-06-06 PROCEDURE — 1124F ACP DISCUSS-NO DSCNMKR DOCD: CPT | Performed by: FAMILY MEDICINE

## 2024-06-06 PROCEDURE — 4004F PT TOBACCO SCREEN RCVD TLK: CPT | Performed by: FAMILY MEDICINE

## 2024-06-06 RX ORDER — BUMETANIDE 1 MG/1
1 TABLET ORAL DAILY
Qty: 30 TABLET | Refills: 3 | Status: SHIPPED
Start: 2024-06-06 | End: 2024-06-06 | Stop reason: SDUPTHER

## 2024-06-06 RX ORDER — NALOXONE HYDROCHLORIDE 4 MG/.1ML
1 SPRAY NASAL PRN
Qty: 1 EACH | Refills: 5 | Status: SHIPPED | OUTPATIENT
Start: 2024-06-06

## 2024-06-06 RX ORDER — OXYCODONE HCL 10 MG/1
10 TABLET, FILM COATED, EXTENDED RELEASE ORAL EVERY 12 HOURS
Qty: 60 EACH | Refills: 0 | Status: SHIPPED | OUTPATIENT
Start: 2024-06-06 | End: 2025-06-06

## 2024-06-06 RX ORDER — BUMETANIDE 1 MG/1
1 TABLET ORAL DAILY
Qty: 30 TABLET | Refills: 3 | Status: SHIPPED
Start: 2024-06-06 | End: 2024-06-07

## 2024-06-06 RX ORDER — OXYCODONE AND ACETAMINOPHEN 7.5; 325 MG/1; MG/1
1 TABLET ORAL EVERY 4 HOURS PRN
Qty: 120 TABLET | Refills: 0 | Status: SHIPPED
Start: 2024-06-06 | End: 2024-06-06 | Stop reason: SDUPTHER

## 2024-06-06 RX ORDER — OXYCODONE AND ACETAMINOPHEN 7.5; 325 MG/1; MG/1
1 TABLET ORAL EVERY 4 HOURS PRN
Qty: 120 TABLET | Refills: 0 | Status: SHIPPED | OUTPATIENT
Start: 2024-06-06 | End: 2024-07-06

## 2024-06-06 RX ORDER — OXYCODONE HCL 10 MG/1
10 TABLET, FILM COATED, EXTENDED RELEASE ORAL EVERY 12 HOURS
Qty: 60 EACH | Refills: 0 | Status: SHIPPED
Start: 2024-06-06 | End: 2024-06-06 | Stop reason: SDUPTHER

## 2024-06-06 ASSESSMENT — ENCOUNTER SYMPTOMS
APNEA: 1
BACK PAIN: 0
CHEST TIGHTNESS: 1
SHORTNESS OF BREATH: 1
CONSTIPATION: 0
DIARRHEA: 1
PHOTOPHOBIA: 0
BLOOD IN STOOL: 0

## 2024-06-06 NOTE — PROGRESS NOTES
Syncytial Virus (RSV) Pregnant or age 60 yrs+ (1 - 1-dose 60+ series) Never done    Diabetic retinal exam  05/17/2023    Diabetic Alb to Cr ratio (uACR) test  08/18/2023    COVID-19 Vaccine (4 - 2023-24 season) 09/01/2023    Diabetic foot exam  09/26/2024    Lipids  09/30/2024    Pneumococcal 65+ years Vaccine (3 of 3 - PPSV23 or PCV20) 01/01/2025    A1C test (Diabetic or Prediabetic)  02/09/2025    Depression Monitoring  02/13/2025    GFR test (Diabetes, CKD 3-4, OR last GFR 15-59)  04/09/2025    Annual Wellness Visit (Medicare Advantage)  Completed    Flu vaccine  Completed    AAA screen  Completed    Hepatitis C screen  Completed    Hepatitis A vaccine  Aged Out    Hepatitis B vaccine  Aged Out    Hib vaccine  Aged Out    Polio vaccine  Aged Out    Meningococcal (ACWY) vaccine  Aged Out    Pneumococcal 0-64 years Vaccine  Discontinued    HIV screen  Discontinued    Prostate Specific Antigen (PSA) Screening or Monitoring  Discontinued      There are no preventive care reminders to display for this patient.   There are no preventive care reminders to display for this patient.     BP (!) 150/66   Pulse 78   Temp 99.3 °F (37.4 °C)   Ht 1.88 m (6' 2.02\")   SpO2 96%   BMI 32.08 kg/m²     Objective   Physical Exam  Vitals reviewed.   Constitutional:       Appearance: He is obese.   HENT:      Head: Normocephalic and atraumatic.      Mouth/Throat:      Dentition: Abnormal dentition (edentulous upper).   Eyes:      General: No scleral icterus.     Conjunctiva/sclera: Conjunctivae normal.      Pupils: Pupils are equal, round, and reactive to light.   Neck:      Thyroid: No thyromegaly.   Cardiovascular:      Rate and Rhythm: Normal rate and regular rhythm.      Heart sounds: Normal heart sounds. No murmur heard.  Pulmonary:      Effort: Pulmonary effort is normal.      Breath sounds: Normal breath sounds. No rales.   Abdominal:      General: Bowel sounds are decreased. There is no distension.      Palpations: Abdomen

## 2024-06-07 ENCOUNTER — TELEPHONE (OUTPATIENT)
Dept: PRIMARY CARE CLINIC | Age: 69
End: 2024-06-07

## 2024-06-07 DIAGNOSIS — I50.9 CONGESTIVE HEART FAILURE, UNSPECIFIED HF CHRONICITY, UNSPECIFIED HEART FAILURE TYPE (HCC): Primary | ICD-10-CM

## 2024-06-07 LAB — HBA1C MFR BLD: 5.6 % (ref 4–5.6)

## 2024-06-07 RX ORDER — BUMETANIDE 1 MG/1
1 TABLET ORAL DAILY
Qty: 90 TABLET | Refills: 3 | Status: SHIPPED | OUTPATIENT
Start: 2024-06-07

## 2024-06-07 NOTE — TELEPHONE ENCOUNTER
[facility] is calling because they need an addendum done on the office note from yesterday on number 7 where it says Patient initially showed up with oxygen saturation of 85%, for insurance purposes they need it to say it was 85% resting on room air, their fax number to resend the office note is [phone]

## 2024-06-07 NOTE — TELEPHONE ENCOUNTER
Per referral for Dr Rose, they are scheduling several months out and in order to get patient in ASAP, they will require a new referral to cardiology so patient can be schedule with first available provider.

## 2024-06-10 NOTE — DISCHARGE INSTRUCTIONS
Visit Discharge/Physician Orders     Discharge condition: Stable     Assessment of pain at discharge: yes     Anesthetic used: 4% lidocaine      Discharge to: Home     Left via:Private automobile     Accompanied by: family     ECF/HHA: [facility]      Dressing Orders: LEFT LATERAL ANKLE: Cleanse wound with normal saline. COVER WITH DRAWTEX. APPLY Profore Change M-W (in [facility])-F.       IN CLINIC 6/5/24: LEFT LATERAL ANKLE: Cleanse with normal saline, apply Drawtex, apply Kerlix & Coban.      IF WRAP FALLS 2 INCHES, OR IF PAIN INCREASES AND BECOMES INTOLERABLE,  REMOVE WRAP AND APPLY DOUBLE TUBIGRIP.  CALL WOUND CARE CENTER.       Treatment Orders: Eat a diet high in protein and vitamin C. Take a multiple vitamin daily unless contraindicated.      Elevate leg as much as possible.     Follow up with Dr. Brooks for foot care     STOP SMOKING!       Continue [facility] followup visit : 1 week _______________________  (Please note your next appointment above and if you are unable to keep, kindly give a 24 hour notice. Thank you.)     Physician signature:__________________________      If you experience any of the following, please call the Wound Care Center during business hours:     * Increase in Pain  * Temperature over 101  * Increase in drainage from your wound  * Drainage with a foul odor  * Bleeding  * Increase in swelling  * Need for compression bandage changes due to slippage, breakthrough drainage.     If you need medical attention outside of the business hours of the Wound Care Centers please contact your PCP or go to the nearest emergency room.

## 2024-06-12 ENCOUNTER — HOSPITAL ENCOUNTER (OUTPATIENT)
Dept: WOUND CARE | Age: 69
Discharge: HOME | End: 2024-06-12
Attending: SURGERY

## 2024-06-13 RX ORDER — NICOTINE 21 MG/24HR
PATCH, TRANSDERMAL 24 HOURS TRANSDERMAL
Qty: 30 PATCH | Refills: 11 | Status: SHIPPED | OUTPATIENT
Start: 2024-06-13

## 2024-06-13 RX ORDER — LANCETS 33 GAUGE
EACH MISCELLANEOUS
Qty: 200 EACH | Refills: 11 | Status: SHIPPED | OUTPATIENT
Start: 2024-06-13

## 2024-06-13 RX ORDER — ISOPROPYL ALCOHOL 70 ML/100ML
SWAB TOPICAL
Qty: 200 EACH | Refills: 11 | Status: SHIPPED | OUTPATIENT
Start: 2024-06-13

## 2024-06-17 NOTE — DISCHARGE INSTRUCTIONS
Visit Discharge/Physician Orders     Discharge condition: Stable     Assessment of pain at discharge: yes     Anesthetic used: 4% lidocaine      Discharge to: Home     Left via:Private automobile     Accompanied by: family     ECF/HHA: [facility]      Dressing Orders: LEFT LATERAL ANKLE: Cleanse wound with normal saline. COVER WITH DRAWTEX. APPLY Profore Change M-W (in [facility])-F.       IF WRAP FALLS 2 INCHES, OR IF PAIN INCREASES AND BECOMES INTOLERABLE,  REMOVE WRAP AND APPLY DOUBLE TUBIGRIP.  CALL WOUND CARE CENTER.       Treatment Orders: Eat a diet high in protein and vitamin C. Take a multiple vitamin daily unless contraindicated.      Elevate leg as much as possible.     Follow up with Dr. Brooks for foot care     STOP SMOKING!       Continue [facility] followup visit : 1 week _______________________  (Please note your next appointment above and if you are unable to keep, kindly give a 24 hour notice. Thank you.)     Physician signature:__________________________      If you experience any of the following, please call the Wound Care Center during business hours:     * Increase in Pain  * Temperature over 101  * Increase in drainage from your wound  * Drainage with a foul odor  * Bleeding  * Increase in swelling  * Need for compression bandage changes due to slippage, breakthrough drainage.     If you need medical attention outside of the business hours of the Wound Care Centers please contact your PCP or go to the nearest emergency room.

## 2024-06-18 ENCOUNTER — TELEPHONE (OUTPATIENT)
Dept: PRIMARY CARE CLINIC | Age: 69
End: 2024-06-18

## 2024-06-18 NOTE — TELEPHONE ENCOUNTER
Continue using the oxygen consistently at this time.  Will defer further evaluation to the cardiologist.

## 2024-06-18 NOTE — TELEPHONE ENCOUNTER
Patient calling to let you know how his oxygen treatment is going. With oxygen on, level is 95-97% at rest. With oxygen off 83-85% at rest. Taking medication that he was prescribed. Urinating a lot and does not feel as tired. Says as long as he has his oxygen on, he does not feel tired.

## 2024-06-19 ENCOUNTER — HOSPITAL ENCOUNTER (OUTPATIENT)
Dept: WOUND CARE | Age: 69
Discharge: HOME | End: 2024-06-19
Attending: SURGERY
Payer: MEDICARE

## 2024-06-19 VITALS
RESPIRATION RATE: 20 BRPM | HEART RATE: 64 BPM | DIASTOLIC BLOOD PRESSURE: 74 MMHG | HEIGHT: 74 IN | BODY MASS INDEX: 32.08 KG/M2 | SYSTOLIC BLOOD PRESSURE: 165 MMHG | WEIGHT: 250 LBS | TEMPERATURE: 98.1 F

## 2024-06-19 DIAGNOSIS — I70.243 ATHEROSCLEROSIS OF NATIVE ARTERY OF LEFT LEG WITH ULCERATION OF ANKLE (HCC): ICD-10-CM

## 2024-06-19 DIAGNOSIS — E11.622 DIABETIC ULCER OF LEFT CALF ASSOCIATED WITH TYPE 2 DIABETES MELLITUS, WITH NECROSIS OF BONE (HCC): ICD-10-CM

## 2024-06-19 DIAGNOSIS — L97.224 DIABETIC ULCER OF LEFT CALF ASSOCIATED WITH TYPE 2 DIABETES MELLITUS, WITH NECROSIS OF BONE (HCC): ICD-10-CM

## 2024-06-19 DIAGNOSIS — M86.462 CHRONIC OSTEOMYELITIS OF LEFT FIBULA WITH DRAINING SINUS (HCC): Primary | ICD-10-CM

## 2024-06-19 PROCEDURE — 11046 DBRDMT MUSC&/FSCA EA ADDL: CPT

## 2024-06-19 PROCEDURE — 11046 DBRDMT MUSC&/FSCA EA ADDL: CPT | Performed by: SURGERY

## 2024-06-19 PROCEDURE — 11043 DBRDMT MUSC&/FSCA 1ST 20/<: CPT

## 2024-06-19 PROCEDURE — 11043 DBRDMT MUSC&/FSCA 1ST 20/<: CPT | Performed by: SURGERY

## 2024-06-19 RX ORDER — IBUPROFEN 200 MG
TABLET ORAL ONCE
OUTPATIENT
Start: 2024-06-19 | End: 2024-06-19

## 2024-06-19 RX ORDER — BETAMETHASONE DIPROPIONATE 0.5 MG/G
CREAM TOPICAL ONCE
OUTPATIENT
Start: 2024-06-19 | End: 2024-06-19

## 2024-06-19 RX ORDER — LIDOCAINE HYDROCHLORIDE 40 MG/ML
SOLUTION TOPICAL ONCE
OUTPATIENT
Start: 2024-06-19 | End: 2024-06-19

## 2024-06-19 RX ORDER — BACITRACIN ZINC 500 [USP'U]/G
OINTMENT TOPICAL ONCE
OUTPATIENT
Start: 2024-06-19 | End: 2024-06-19

## 2024-06-19 RX ORDER — SODIUM CHLOR/HYPOCHLOROUS ACID 0.033 %
SOLUTION, IRRIGATION IRRIGATION ONCE
OUTPATIENT
Start: 2024-06-19 | End: 2024-06-19

## 2024-06-19 RX ORDER — LIDOCAINE 40 MG/G
CREAM TOPICAL ONCE
OUTPATIENT
Start: 2024-06-19 | End: 2024-06-19

## 2024-06-19 RX ORDER — LIDOCAINE HYDROCHLORIDE 20 MG/ML
JELLY TOPICAL ONCE
OUTPATIENT
Start: 2024-06-19 | End: 2024-06-19

## 2024-06-19 RX ORDER — LIDOCAINE HYDROCHLORIDE 40 MG/ML
SOLUTION TOPICAL ONCE
Status: COMPLETED | OUTPATIENT
Start: 2024-06-19 | End: 2024-06-19

## 2024-06-19 RX ORDER — CLOBETASOL PROPIONATE 0.5 MG/G
OINTMENT TOPICAL ONCE
OUTPATIENT
Start: 2024-06-19 | End: 2024-06-19

## 2024-06-19 RX ORDER — LIDOCAINE 50 MG/G
OINTMENT TOPICAL ONCE
OUTPATIENT
Start: 2024-06-19 | End: 2024-06-19

## 2024-06-19 RX ORDER — LANCETS 33 GAUGE
EACH MISCELLANEOUS
Qty: 200 EACH | Refills: 10 | Status: SHIPPED | OUTPATIENT
Start: 2024-06-19

## 2024-06-19 RX ORDER — GENTAMICIN SULFATE 1 MG/G
OINTMENT TOPICAL ONCE
OUTPATIENT
Start: 2024-06-19 | End: 2024-06-19

## 2024-06-19 RX ORDER — TRIAMCINOLONE ACETONIDE 1 MG/G
OINTMENT TOPICAL ONCE
OUTPATIENT
Start: 2024-06-19 | End: 2024-06-19

## 2024-06-19 RX ORDER — BACITRACIN ZINC AND POLYMYXIN B SULFATE 500; 1000 [USP'U]/G; [USP'U]/G
OINTMENT TOPICAL ONCE
OUTPATIENT
Start: 2024-06-19 | End: 2024-06-19

## 2024-06-19 RX ADMIN — LIDOCAINE HYDROCHLORIDE 5 ML: 40 SOLUTION TOPICAL at 11:26

## 2024-06-19 ASSESSMENT — PAIN DESCRIPTION - LOCATION: LOCATION: ANKLE

## 2024-06-19 ASSESSMENT — PAIN SCALES - GENERAL: PAINLEVEL_OUTOF10: 9

## 2024-06-19 ASSESSMENT — PAIN DESCRIPTION - FREQUENCY: FREQUENCY: INTERMITTENT

## 2024-06-19 ASSESSMENT — PAIN DESCRIPTION - DESCRIPTORS: DESCRIPTORS: ACHING

## 2024-06-19 ASSESSMENT — PAIN DESCRIPTION - PAIN TYPE: TYPE: CHRONIC PAIN

## 2024-06-19 ASSESSMENT — PAIN DESCRIPTION - ORIENTATION: ORIENTATION: LEFT

## 2024-06-19 NOTE — PROGRESS NOTES
Healing % 100 06/19/24 1126   Post-Procedure Length (cm) 0 cm 06/19/24 1146   Post-Procedure Width (cm) 0 cm 06/19/24 1146   Post-Procedure Depth (cm) 0 cm 06/19/24 1146   Post-Procedure Surface Area (cm^2) 0 cm^2 06/19/24 1146   Post-Procedure Volume (cm^3) 0 cm^3 06/19/24 1146   Wound Assessment Dry 06/19/24 1126   Drainage Amount None (dry) 06/19/24 1126   Drainage Description Yellow 06/05/24 1058   Odor None 06/19/24 1126   Nadia-wound Assessment Intact 06/19/24 1126   Number of days: 14          Procedure Note  Indications:  Based on my examination of this patient's wound(s)/ulcer(s) today, debridement is required to promote healing and evaluate the wound base.    Performed by: Sydnee Baca MD    Consent obtained:  Yes    Time out taken:  Yes    Pain Control: Anesthetic  Anesthetic: 4% Lidocaine Liquid Topical     Debridement:Excisional Debridement    Using curette the wound(s)/ulcer(s) was/were sharply debrided down through and including the removal of epidermis, dermis, subcutaneous tissue, and muscle/fascia.        Devitalized Tissue Debrided:  fibrin, biofilm, slough, and exudate to stimulate bleeding to promote healing, post debridement good bleeding base and wound edges noted    Wound/Ulcer #: 1    Percent of Wound/Ulcer Debrided: 100%    Total Surface Area Debrided:  34 sq cm     Estimated Blood Loss:  None  Hemostasis Achieved:  by pressure    Procedural Pain:  6  / 10   Post Procedural Pain:  5 / 10     Response to treatment:  Well tolerated by patient.     A culture was not done.        Plan:   Treatment Note please see attached Discharge Instructions    Written patient dismissal instructions given to patient and signed by patient or POA.         Discharge Instructions         Visit Discharge/Physician Orders     Discharge condition: Stable     Assessment of pain at discharge: yes     Anesthetic used: 4% lidocaine      Discharge to: Home     Left via:Private automobile     Accompanied by:

## 2024-06-24 RX ORDER — LANCETS 33 GAUGE
EACH MISCELLANEOUS
Qty: 200 EACH | Refills: 10 | Status: SHIPPED
Start: 2024-06-24 | End: 2024-06-25

## 2024-06-24 NOTE — DISCHARGE INSTRUCTIONS
Visit Discharge/Physician Orders     Discharge condition: Stable     Assessment of pain at discharge: yes     Anesthetic used: 4% lidocaine      Discharge to: Home     Left via:Private automobile     Accompanied by: family     ECF/HHA: [facility]      Dressing Orders: LEFT LATERAL ANKLE: Cleanse wound with normal saline. COVER WITH DRAWTEX. APPLY Profore Change M-W (in [facility])-F.       IF WRAP FALLS 2 INCHES, OR IF PAIN INCREASES AND BECOMES INTOLERABLE,  REMOVE WRAP AND APPLY DOUBLE TUBIGRIP.  CALL WOUND CARE CENTER.       Treatment Orders: Eat a diet high in protein and vitamin C. Take a multiple vitamin daily unless contraindicated.      Elevate leg as much as possible.     Follow up with Dr. Brooks for foot care     STOP SMOKING!      [facility] followup visit : 1 week _______________________  (Please note your next appointment above and if you are unable to keep, kindly give a 24 hour notice. Thank you.)     Physician signature:__________________________      If you experience any of the following, please call the Wound Care Center during business hours:     * Increase in Pain  * Temperature over 101  * Increase in drainage from your wound  * Drainage with a foul odor  * Bleeding  * Increase in swelling  * Need for compression bandage changes due to slippage, breakthrough drainage.     If you need medical attention outside of the business hours of the Wound Care Centers please contact your PCP or go to the nearest emergency room.

## 2024-06-25 PROBLEM — S81.802A WOUND OF LEFT LEG: Status: RESOLVED | Noted: 2024-05-01 | Resolved: 2024-06-25

## 2024-06-25 PROBLEM — I12.9 BENIGN HYPERTENSIVE KIDNEY DISEASE WITH CHRONIC KIDNEY DISEASE: Status: RESOLVED | Noted: 2023-01-09 | Resolved: 2024-06-25

## 2024-06-25 PROBLEM — R70.0 ELEVATED SEDIMENTATION RATE: Status: RESOLVED | Noted: 2021-05-27 | Resolved: 2024-06-25

## 2024-06-25 PROBLEM — S81.802A OPEN WOUND OF LEFT LOWER LEG: Status: RESOLVED | Noted: 2023-06-05 | Resolved: 2024-06-25

## 2024-06-25 PROBLEM — G89.4 CHRONIC PAIN SYNDROME: Status: RESOLVED | Noted: 2021-02-22 | Resolved: 2024-06-25

## 2024-06-25 PROBLEM — D72.829 LEUKOCYTOSIS: Status: RESOLVED | Noted: 2018-07-31 | Resolved: 2024-06-25

## 2024-06-25 PROBLEM — Z22.322 CARRIER OF METHICILLIN RESISTANT STAPHYLOCOCCUS AUREUS: Status: RESOLVED | Noted: 2023-02-14 | Resolved: 2024-06-25

## 2024-06-25 PROBLEM — M62.81 MUSCLE WEAKNESS (GENERALIZED): Status: RESOLVED | Noted: 2020-01-16 | Resolved: 2024-06-25

## 2024-06-25 PROBLEM — I50.30 (HFPEF) HEART FAILURE WITH PRESERVED EJECTION FRACTION (HCC): Status: ACTIVE | Noted: 2024-06-25

## 2024-06-25 PROBLEM — G89.18 POSTOPERATIVE PAIN: Status: RESOLVED | Noted: 2020-06-11 | Resolved: 2024-06-25

## 2024-06-25 RX ORDER — LANCETS 33 GAUGE
EACH MISCELLANEOUS
Qty: 200 EACH | Refills: 10 | Status: SHIPPED | OUTPATIENT
Start: 2024-06-25

## 2024-06-26 ENCOUNTER — HOSPITAL ENCOUNTER (OUTPATIENT)
Dept: WOUND CARE | Age: 69
Discharge: HOME | End: 2024-06-26
Attending: SURGERY
Payer: MEDICARE

## 2024-06-26 VITALS
BODY MASS INDEX: 32.08 KG/M2 | DIASTOLIC BLOOD PRESSURE: 70 MMHG | RESPIRATION RATE: 20 BRPM | SYSTOLIC BLOOD PRESSURE: 180 MMHG | HEIGHT: 74 IN | HEART RATE: 73 BPM | TEMPERATURE: 98.7 F | WEIGHT: 250 LBS

## 2024-06-26 DIAGNOSIS — L97.224 DIABETIC ULCER OF LEFT CALF ASSOCIATED WITH TYPE 2 DIABETES MELLITUS, WITH NECROSIS OF BONE (HCC): ICD-10-CM

## 2024-06-26 DIAGNOSIS — I70.243 ATHEROSCLEROSIS OF NATIVE ARTERY OF LEFT LEG WITH ULCERATION OF ANKLE (HCC): ICD-10-CM

## 2024-06-26 DIAGNOSIS — M86.462 CHRONIC OSTEOMYELITIS OF LEFT FIBULA WITH DRAINING SINUS (HCC): Primary | ICD-10-CM

## 2024-06-26 DIAGNOSIS — E11.622 DIABETIC ULCER OF LEFT CALF ASSOCIATED WITH TYPE 2 DIABETES MELLITUS, WITH NECROSIS OF BONE (HCC): ICD-10-CM

## 2024-06-26 PROCEDURE — 11043 DBRDMT MUSC&/FSCA 1ST 20/<: CPT

## 2024-06-26 PROCEDURE — 11043 DBRDMT MUSC&/FSCA 1ST 20/<: CPT | Performed by: SURGERY

## 2024-06-26 PROCEDURE — 11046 DBRDMT MUSC&/FSCA EA ADDL: CPT | Performed by: SURGERY

## 2024-06-26 RX ORDER — LIDOCAINE HYDROCHLORIDE 20 MG/ML
JELLY TOPICAL ONCE
OUTPATIENT
Start: 2024-06-26 | End: 2024-06-26

## 2024-06-26 RX ORDER — LIDOCAINE 40 MG/G
CREAM TOPICAL ONCE
OUTPATIENT
Start: 2024-06-26 | End: 2024-06-26

## 2024-06-26 RX ORDER — BACITRACIN ZINC AND POLYMYXIN B SULFATE 500; 1000 [USP'U]/G; [USP'U]/G
OINTMENT TOPICAL ONCE
OUTPATIENT
Start: 2024-06-26 | End: 2024-06-26

## 2024-06-26 RX ORDER — LIDOCAINE HYDROCHLORIDE 40 MG/ML
SOLUTION TOPICAL ONCE
OUTPATIENT
Start: 2024-06-26 | End: 2024-06-26

## 2024-06-26 RX ORDER — LIDOCAINE HYDROCHLORIDE 40 MG/ML
SOLUTION TOPICAL ONCE
Status: COMPLETED | OUTPATIENT
Start: 2024-06-26 | End: 2024-06-26

## 2024-06-26 RX ORDER — SODIUM CHLOR/HYPOCHLOROUS ACID 0.033 %
SOLUTION, IRRIGATION IRRIGATION ONCE
OUTPATIENT
Start: 2024-06-26 | End: 2024-06-26

## 2024-06-26 RX ORDER — IBUPROFEN 200 MG
TABLET ORAL ONCE
OUTPATIENT
Start: 2024-06-26 | End: 2024-06-26

## 2024-06-26 RX ORDER — TRIAMCINOLONE ACETONIDE 1 MG/G
OINTMENT TOPICAL ONCE
OUTPATIENT
Start: 2024-06-26 | End: 2024-06-26

## 2024-06-26 RX ORDER — BETAMETHASONE DIPROPIONATE 0.5 MG/G
CREAM TOPICAL ONCE
OUTPATIENT
Start: 2024-06-26 | End: 2024-06-26

## 2024-06-26 RX ORDER — GENTAMICIN SULFATE 1 MG/G
OINTMENT TOPICAL ONCE
OUTPATIENT
Start: 2024-06-26 | End: 2024-06-26

## 2024-06-26 RX ORDER — CLOBETASOL PROPIONATE 0.5 MG/G
OINTMENT TOPICAL ONCE
OUTPATIENT
Start: 2024-06-26 | End: 2024-06-26

## 2024-06-26 RX ORDER — BACITRACIN ZINC 500 [USP'U]/G
OINTMENT TOPICAL ONCE
OUTPATIENT
Start: 2024-06-26 | End: 2024-06-26

## 2024-06-26 RX ORDER — LIDOCAINE 50 MG/G
OINTMENT TOPICAL ONCE
OUTPATIENT
Start: 2024-06-26 | End: 2024-06-26

## 2024-06-26 RX ADMIN — LIDOCAINE HYDROCHLORIDE: 40 SOLUTION TOPICAL at 11:46

## 2024-06-26 NOTE — PROGRESS NOTES
([facility]) 10/24/2018    Osteomyelitis of right lower limb ([facility]) 10/24/2018    Pain syndrome, chronic     Postoperative wound infection     PVD (peripheral vascular disease) ([facility])     Tobacco dependence     Vascular occlusion     Vitamin D deficiency     Wound infection 04/16/2020     Past Surgical History:   Procedure Laterality Date    AMPUTATION ABOVE KNEE Right 4/28/2020    DEBRIDEMENT OF AMPUTATION RIGHT ABOVE KNEE performed [facility]    AMPUTATION ABOVE KNEE Right 4/30/2020    DEBRIDEMENT OF AMPUTATION RIGHT ABOVE KNEE,  POSS. ABOVE KNEE REVISION, POSS. CLOSURE, POSS.WOUND VAC -- DRS. CASH / CAROL TO LOOK IN performed by Sydnee Baca [facility] [place] [facility] [place]    FEMORAL BYPASS      Right - [place], [place]    FEMORAL ENDARTERECTOMY Right 3/20/2020    RIGHT LOWER EXTREMITY THROMBECTOMY, POSSIBLE ANGIOGRAM, POSSIBLE INTERVENTION, POSSIBLE BYPASS. performed by Sydnee Baca MD [facility] [place] [facility]    INVASIVE VASCULAR N/A 4/9/2024    Aortagram abdominal performed by Sydnee Baca MD at [facility] CARD[facility]AC CATH LAB    INVASIVE VASCULAR N/A 4/9/2024    Angioplasty peripheral artery performed by Sydnee Baca MD at [facility] [place] [facility] LAB    LEG SURGERY Right 4/17/2020    RIGHT LEG DEBRIDEMENT INCISION AND DRAINAGE performed by Ken Gaviria MD at [facility] [place] [facility] [place]    LEG SURGERY Right 4/20/2020    RIGHT THIGH WOUND DRESSING CHANGE; DEBRIDEMENT, removal of muscle performed by Ken Gaviria MD at [facility] [place] [facility] [place]    LEG SURGERY Right 4/21/2020    RIGHT THIGH WOUND DRESSING CHANGE POSSIBLE  DEBRIDEMENT - NEEDS DRS. CASH / JANETTE TO SEE performed by Wes Pretty MD at [facility] [place] [facility] [place]    LEG SURGERY Right 4/23/2020    RIGHT THIGH WOUND DRESSING CHANGE and DEBRIDEMENT performed by Sydnee Baca MD at [facility] [place] [facility] [place]    LEG SURGERY Right 10/15/2020    DEBRIDEMENT RIGHT ABOVE KNEE AMPUTATION POSS. REVISION POSS. WOUND VAC performed by Sydnee Baca MD at [facility] [place] [facility] [place]    LEG SURGERY

## 2024-07-01 RX ORDER — LANCETS 33 GAUGE
EACH MISCELLANEOUS
Qty: 200 EACH | Refills: 10 | Status: SHIPPED | OUTPATIENT
Start: 2024-07-01

## 2024-07-02 RX ORDER — LANCETS 33 GAUGE
EACH MISCELLANEOUS
Qty: 200 EACH | Refills: 10 | OUTPATIENT
Start: 2024-07-02

## 2024-07-10 PROBLEM — I73.9 PVD (PERIPHERAL VASCULAR DISEASE) (HCC): Status: RESOLVED | Noted: 2018-07-31 | Resolved: 2024-07-10

## 2024-07-11 ENCOUNTER — OFFICE VISIT (OUTPATIENT)
Dept: CARDIOLOGY CLINIC | Age: 69
End: 2024-07-11
Payer: MEDICARE

## 2024-07-11 VITALS
SYSTOLIC BLOOD PRESSURE: 151 MMHG | RESPIRATION RATE: 18 BRPM | BODY MASS INDEX: 36.83 KG/M2 | DIASTOLIC BLOOD PRESSURE: 99 MMHG | HEART RATE: 70 BPM | WEIGHT: 287 LBS | HEIGHT: 74 IN

## 2024-07-11 DIAGNOSIS — N25.81 SECONDARY HYPERPARATHYROIDISM OF RENAL ORIGIN (HCC): ICD-10-CM

## 2024-07-11 DIAGNOSIS — I10 HYPERTENSION, UNSPECIFIED TYPE: Primary | ICD-10-CM

## 2024-07-11 PROCEDURE — 1124F ACP DISCUSS-NO DSCNMKR DOCD: CPT | Performed by: INTERNAL MEDICINE

## 2024-07-11 PROCEDURE — 4004F PT TOBACCO SCREEN RCVD TLK: CPT | Performed by: INTERNAL MEDICINE

## 2024-07-11 PROCEDURE — 99205 OFFICE O/P NEW HI 60 MIN: CPT | Performed by: INTERNAL MEDICINE

## 2024-07-11 PROCEDURE — 93000 ELECTROCARDIOGRAM COMPLETE: CPT | Performed by: INTERNAL MEDICINE

## 2024-07-11 PROCEDURE — 3077F SYST BP >= 140 MM HG: CPT | Performed by: INTERNAL MEDICINE

## 2024-07-11 PROCEDURE — 3080F DIAST BP >= 90 MM HG: CPT | Performed by: INTERNAL MEDICINE

## 2024-07-11 PROCEDURE — G8427 DOCREV CUR MEDS BY ELIG CLIN: HCPCS | Performed by: INTERNAL MEDICINE

## 2024-07-11 PROCEDURE — G8417 CALC BMI ABV UP PARAM F/U: HCPCS | Performed by: INTERNAL MEDICINE

## 2024-07-11 PROCEDURE — 3017F COLORECTAL CA SCREEN DOC REV: CPT | Performed by: INTERNAL MEDICINE

## 2024-07-11 RX ORDER — LOSARTAN POTASSIUM 25 MG/1
25 TABLET ORAL DAILY
COMMUNITY
Start: 2024-03-05

## 2024-07-11 RX ORDER — BUMETANIDE 1 MG/1
1 TABLET ORAL DAILY
COMMUNITY

## 2024-07-11 NOTE — PROGRESS NOTES
amLODIPine (NORVASC) 10 MG tablet TAKE 1 TABLET BY MOUTH ONCE DAILY 30 tablet 10    chlorthalidone (HYGROTON) 25 MG tablet TAKE 1 TABLET BY MOUTH DAILY 30 tablet 10    cloNIDine (CATAPRES) 0.1 MG tablet TAKE 1 TABLET BY MOUTH 3 TIMES DAILY 90 tablet 10    doxycycline hyclate (VIBRA-TABS) 100 MG tablet TAKE ONE (1) TABLET BY MOUTH TWICE DAILY 60 tablet 10    cilostazol (PLETAL) 50 MG tablet TAKE 1 TABLET BY MOUTH TWICE DAILY 60 tablet 10    hydrALAZINE (APRESOLINE) 100 MG tablet Take 1 tablet by mouth 3 times daily      ammonium lactate (LAC-HYDRIN) 12 % lotion APPLY TOPICALLY TWICE DAILY AS NEEDED 400 g 10    insulin glargine (LANTUS SOLOSTAR) 100 UNIT/ML injection pen INJECT 10 UNITS SUBCUTANEOUSLY IN THE MORNING AND 30 UNITS EVERY EVENING 15 mL 10    gabapentin (NEURONTIN) 800 MG tablet TAKE 1 TABLET BY MOUTH FOUR TIMES DAILY 120 tablet 10    DULoxetine (CYMBALTA) 60 MG extended release capsule TAKE TWO (2) CAPSULES BY MOUTH EVERY MORNING 60 capsule 10    mirtazapine (REMERON) 7.5 MG tablet TAKE ONE TABLET BY MOUTH ONCE NIGHTLY 90 tablet 3    vitamin D (ERGOCALCIFEROL) 1.25 MG (41885 UT) CAPS capsule mondays      Insulin Syringe-Needle U-100 ([employer]ARE INSULIN SYRINGE) 31G X 5/16\" 0.3 ML MISC 1 each by Other route 5 times daily 500 each 3     No current facility-administered medications for this visit.        No Known Allergies    Vitals:    07/11/24 1318   BP: (!) 151/99   Pulse: 70   Resp: 18   Weight: 130.2 kg (287 lb)   Height: 1.88 m (6' 2\")                 SUBJECTIVE: Florentin Stark presents to the office today for consult - dr anderson.  Hx of CKD, resistant HTN on 6 agents managed by dr santana, likely HFpEF     He complains of dyspnea lately and denies   chest pain, palpitations, and syncope.  Saw dr anderson - CXR with chf (I reviewed images and agree), as well as BNP 1578 with GFR 35  Started on bumex, feels somewhat better  Home health aide samples BPs and they are lower than today's          Physical Exam

## 2024-07-11 NOTE — PATIENT INSTRUCTIONS
Take the bumetanide (bumex) TWICE A DAY for a week, then go back on once a day  Dr Reynolds will then guide you on water pill  We will tell you the date of the echocardiogram done here

## 2024-07-12 NOTE — DISCHARGE INSTRUCTIONS
Visit Discharge/Physician Orders     Discharge condition: Stable     Assessment of pain at discharge: yes     Anesthetic used: 4% lidocaine      Discharge to: Home     Left via:Private automobile     Accompanied by: family     ECF/HHA: [facility]      Dressing Orders: LEFT LATERAL ANKLE: Cleanse wound with normal saline. COVER WITH DRAWTEX. APPLY Profore Change M-W (in [facility])-F.       IF WRAP FALLS 2 INCHES, OR IF PAIN INCREASES AND BECOMES INTOLERABLE,  REMOVE WRAP AND APPLY DOUBLE TUBIGRIP.  CALL WOUND CARE CENTER.       Treatment Orders: Eat a diet high in protein and vitamin C. Take a multiple vitamin daily unless contraindicated.      Elevate leg as much as possible.     Follow up with Dr. Brooks for foot care     STOP SMOKING!      [facility] followup visit : 1 week _______________________  (Please note your next appointment above and if you are unable to keep, kindly give a 24 hour notice. Thank you.)     Physician signature:__________________________      If you experience any of the following, please call the Wound Care Center during business hours:     * Increase in Pain  * Temperature over 101  * Increase in drainage from your wound  * Drainage with a foul odor  * Bleeding  * Increase in swelling  * Need for compression bandage changes due to slippage, breakthrough drainage.     If you need medical attention outside of the business hours of the Wound Care Centers please contact your PCP or go to the nearest emergency room.

## 2024-07-15 RX ORDER — MIRTAZAPINE 7.5 MG/1
TABLET, FILM COATED ORAL
Qty: 30 TABLET | Refills: 10 | Status: SHIPPED | OUTPATIENT
Start: 2024-07-15

## 2024-07-17 ENCOUNTER — HOSPITAL ENCOUNTER (OUTPATIENT)
Dept: WOUND CARE | Age: 69
Discharge: HOME | End: 2024-07-17
Attending: SURGERY
Payer: MEDICARE

## 2024-07-17 VITALS
BODY MASS INDEX: 36.83 KG/M2 | TEMPERATURE: 98.4 F | HEIGHT: 74 IN | DIASTOLIC BLOOD PRESSURE: 58 MMHG | HEART RATE: 85 BPM | RESPIRATION RATE: 18 BRPM | SYSTOLIC BLOOD PRESSURE: 140 MMHG | WEIGHT: 287 LBS

## 2024-07-17 DIAGNOSIS — I70.243 ATHEROSCLEROSIS OF NATIVE ARTERY OF LEFT LEG WITH ULCERATION OF ANKLE (HCC): ICD-10-CM

## 2024-07-17 DIAGNOSIS — L97.224 DIABETIC ULCER OF LEFT CALF ASSOCIATED WITH TYPE 2 DIABETES MELLITUS, WITH NECROSIS OF BONE (HCC): ICD-10-CM

## 2024-07-17 DIAGNOSIS — E11.622 DIABETIC ULCER OF LEFT CALF ASSOCIATED WITH TYPE 2 DIABETES MELLITUS, WITH NECROSIS OF BONE (HCC): ICD-10-CM

## 2024-07-17 DIAGNOSIS — M86.462 CHRONIC OSTEOMYELITIS OF LEFT FIBULA WITH DRAINING SINUS (HCC): Primary | ICD-10-CM

## 2024-07-17 PROCEDURE — 11042 DBRDMT SUBQ TIS 1ST 20SQCM/<: CPT | Performed by: SURGERY

## 2024-07-17 PROCEDURE — 11045 DBRDMT SUBQ TISS EACH ADDL: CPT | Performed by: SURGERY

## 2024-07-17 PROCEDURE — 11042 DBRDMT SUBQ TIS 1ST 20SQCM/<: CPT

## 2024-07-17 RX ORDER — LIDOCAINE HYDROCHLORIDE 20 MG/ML
JELLY TOPICAL ONCE
OUTPATIENT
Start: 2024-07-17 | End: 2024-07-17

## 2024-07-17 RX ORDER — BACITRACIN ZINC AND POLYMYXIN B SULFATE 500; 1000 [USP'U]/G; [USP'U]/G
OINTMENT TOPICAL ONCE
OUTPATIENT
Start: 2024-07-17 | End: 2024-07-17

## 2024-07-17 RX ORDER — LIDOCAINE HYDROCHLORIDE 40 MG/ML
SOLUTION TOPICAL ONCE
Status: COMPLETED | OUTPATIENT
Start: 2024-07-17 | End: 2024-07-17

## 2024-07-17 RX ORDER — TRIAMCINOLONE ACETONIDE 1 MG/G
OINTMENT TOPICAL ONCE
OUTPATIENT
Start: 2024-07-17 | End: 2024-07-17

## 2024-07-17 RX ORDER — IBUPROFEN 200 MG
TABLET ORAL ONCE
OUTPATIENT
Start: 2024-07-17 | End: 2024-07-17

## 2024-07-17 RX ORDER — LIDOCAINE HYDROCHLORIDE 40 MG/ML
SOLUTION TOPICAL ONCE
OUTPATIENT
Start: 2024-07-17 | End: 2024-07-17

## 2024-07-17 RX ORDER — LIDOCAINE 50 MG/G
OINTMENT TOPICAL ONCE
OUTPATIENT
Start: 2024-07-17 | End: 2024-07-17

## 2024-07-17 RX ORDER — LIDOCAINE 40 MG/G
CREAM TOPICAL ONCE
OUTPATIENT
Start: 2024-07-17 | End: 2024-07-17

## 2024-07-17 RX ORDER — BETAMETHASONE DIPROPIONATE 0.5 MG/G
CREAM TOPICAL ONCE
OUTPATIENT
Start: 2024-07-17 | End: 2024-07-17

## 2024-07-17 RX ORDER — GENTAMICIN SULFATE 1 MG/G
OINTMENT TOPICAL ONCE
OUTPATIENT
Start: 2024-07-17 | End: 2024-07-17

## 2024-07-17 RX ORDER — CLOBETASOL PROPIONATE 0.5 MG/G
OINTMENT TOPICAL ONCE
OUTPATIENT
Start: 2024-07-17 | End: 2024-07-17

## 2024-07-17 RX ORDER — SODIUM CHLOR/HYPOCHLOROUS ACID 0.033 %
SOLUTION, IRRIGATION IRRIGATION ONCE
OUTPATIENT
Start: 2024-07-17 | End: 2024-07-17

## 2024-07-17 RX ORDER — BACITRACIN ZINC 500 [USP'U]/G
OINTMENT TOPICAL ONCE
OUTPATIENT
Start: 2024-07-17 | End: 2024-07-17

## 2024-07-17 RX ADMIN — LIDOCAINE HYDROCHLORIDE 10 ML: 40 SOLUTION TOPICAL at 11:09

## 2024-07-17 NOTE — PLAN OF CARE
Problem: Chronic Conditions and Co-morbidities  Goal: Patient's chronic conditions and co-morbidity symptoms are monitored and maintained or improved  Outcome: Progressing     Problem: Cognitive:  Goal: Knowledge of wound care  Description: Knowledge of wound care  Outcome: Progressing  Goal: Understands risk factors for wounds  Description: Understands risk factors for wounds  Outcome: Progressing     Problem: Wound:  Goal: Will show signs of wound healing; wound closure and no evidence of infection  Description: Will show signs of wound healing; wound closure and no evidence of infection  Outcome: Progressing     Problem: Blood Glucose:  Goal: Ability to maintain appropriate glucose levels will improve  Description: Ability to maintain appropriate glucose levels will improve  Outcome: Adequate for Discharge     Problem: Compression therapy:  Goal: Will be free from complications associated with compression therapy  Description: Will be free from complications associated with compression therapy  Outcome: Progressing

## 2024-07-17 NOTE — PROGRESS NOTES
[facility] Followup Visit Note    Referring Physician : Juanito Kline DO  Florentin Stark  MEDICAL RECORD NUMBER:  [id number]  AGE: 68 y.o.   GENDER: male  : 1955  EPISODE DATE:  2024    Subjective:     Chief Complaint   Patient presents with    Wound Check      HISTORY of PRESENT ILLNESS HPI   Florentin Stark is a 68 y.o. male who presents today in regards to follow up evaluation and treatment of wound/ulcer.  That patient's past medical, family and social hx were reviewed and changes were made if present.    History of Wound Context:  Patient has had wound of his left lateral calf since .  He was following with Dr Brooks in regards to this as outpt.  He was admitted 2023 with worsening wounds.      He underwent angiogram with L SFA, pop plasty and L Tp trunk, Proximal PT plasty on 23.  While admitted he was seen by dr NORRIS Hurt from podiatry who had done bedside debridement.  He was discharge on 6 weeks of zosyn per ID.  He fu with Dr Camara from ID .  She started him on augmentin for 2 week after completion of zosyn 11/10.      He has not fu with Dr Brooks or Dr Hurt since discharge from hospital.    11/15/23  L DP strongly biphasic, PT weakly biphasic  Lateral calf wound is much improved in appearance  Bone is still exposed  He will continue to followup with Dr Brooks as outpt  He will fu with me in 6 weeks  We did discuss hbo but pt does not have transportation that would make this feasible - he will let me know if that would change  24  L DP biphasic, PT weakly biphasic  Appearance of wound is better, bone is still exposed  Significant amount of swelling   Coban 2, drawtek - mwf  Pt will follow in wound center going forward  24  Wound improving, bone exposed  Insurance not covering Coban 2  Still with significant swelling  24  Wound stable  Bone remains exposed  Swelling improved  24  Bone exposed  Wound slightly larger but appearance of edges

## 2024-07-28 ENCOUNTER — APPOINTMENT (OUTPATIENT)
Dept: GENERAL RADIOLOGY | Age: 69
End: 2024-07-28
Payer: MEDICARE

## 2024-07-28 ENCOUNTER — HOSPITAL ENCOUNTER (INPATIENT)
Age: 69
LOS: 5 days | Discharge: HOME | End: 2024-08-02
Attending: EMERGENCY MEDICINE | Admitting: INTERNAL MEDICINE
Payer: MEDICARE

## 2024-07-28 DIAGNOSIS — R79.89 ELEVATED TROPONIN: ICD-10-CM

## 2024-07-28 DIAGNOSIS — N17.9 ACUTE KIDNEY INJURY SUPERIMPOSED ON CHRONIC KIDNEY DISEASE (HCC): ICD-10-CM

## 2024-07-28 DIAGNOSIS — R06.02 SHORTNESS OF BREATH: ICD-10-CM

## 2024-07-28 DIAGNOSIS — N18.9 ACUTE KIDNEY INJURY SUPERIMPOSED ON CHRONIC KIDNEY DISEASE (HCC): ICD-10-CM

## 2024-07-28 DIAGNOSIS — R79.89 ELEVATED D-DIMER: ICD-10-CM

## 2024-07-28 DIAGNOSIS — R06.2 WHEEZING: ICD-10-CM

## 2024-07-28 DIAGNOSIS — J18.9 PNEUMONIA DUE TO INFECTIOUS ORGANISM, UNSPECIFIED LATERALITY, UNSPECIFIED PART OF LUNG: ICD-10-CM

## 2024-07-28 DIAGNOSIS — J96.21 ACUTE ON CHRONIC RESPIRATORY FAILURE WITH HYPOXIA (HCC): Primary | ICD-10-CM

## 2024-07-28 DIAGNOSIS — I50.9 CONGESTIVE HEART FAILURE, UNSPECIFIED HF CHRONICITY, UNSPECIFIED HEART FAILURE TYPE (HCC): ICD-10-CM

## 2024-07-28 LAB
ALBUMIN SERPL-MCNC: 3.4 G/DL (ref 3.5–5.2)
ALP SERPL-CCNC: 104 U/L (ref 40–129)
ALT SERPL-CCNC: 12 U/L (ref 0–40)
ANION GAP SERPL CALCULATED.3IONS-SCNC: 9 MMOL/L (ref 7–16)
AST SERPL-CCNC: 16 U/L (ref 0–39)
B PARAP IS1001 DNA NPH QL NAA+NON-PROBE: NOT DETECTED
B PERT DNA SPEC QL NAA+PROBE: NOT DETECTED
B.E.: 0.5 MMOL/L (ref -3–3)
B.E.: 0.7 MMOL/L (ref -3–3)
B.E.: 2.3 MMOL/L (ref -3–3)
BASOPHILS # BLD: 0.03 K/UL (ref 0–0.2)
BASOPHILS NFR BLD: 0 % (ref 0–2)
BILIRUB SERPL-MCNC: 0.2 MG/DL (ref 0–1.2)
BUN SERPL-MCNC: 64 MG/DL (ref 6–23)
C PNEUM DNA NPH QL NAA+NON-PROBE: NOT DETECTED
CALCIUM SERPL-MCNC: 9.1 MG/DL (ref 8.6–10.2)
CHLORIDE SERPL-SCNC: 100 MMOL/L (ref 98–107)
CO2 SERPL-SCNC: 29 MMOL/L (ref 22–29)
CREAT SERPL-MCNC: 2.6 MG/DL (ref 0.7–1.2)
CRITICAL: ABNORMAL
D-DIMER QUANTITATIVE: 1298 NG/ML DDU (ref 0–230)
DATE ANALYZED: ABNORMAL
DATE OF COLLECTION: ABNORMAL
EOSINOPHIL # BLD: 0.08 K/UL (ref 0.05–0.5)
EOSINOPHILS RELATIVE PERCENT: 1 % (ref 0–6)
ERYTHROCYTE [DISTWIDTH] IN BLOOD BY AUTOMATED COUNT: 16.3 % (ref 11.5–15)
ERYTHROCYTE [DISTWIDTH] IN BLOOD BY AUTOMATED COUNT: 16.5 % (ref 11.5–15)
FLUAV RNA NPH QL NAA+NON-PROBE: NOT DETECTED
FLUBV RNA NPH QL NAA+NON-PROBE: NOT DETECTED
GFR, ESTIMATED: 26 ML/MIN/1.73M2
GLUCOSE BLD-MCNC: 144 MG/DL (ref 74–99)
GLUCOSE SERPL-MCNC: 102 MG/DL (ref 74–99)
HADV DNA NPH QL NAA+NON-PROBE: NOT DETECTED
HCO3: 27.8 MMOL/L (ref 22–26)
HCO3: 27.8 MMOL/L (ref 22–26)
HCO3: 29.6 MMOL/L (ref 22–26)
HCOV 229E RNA NPH QL NAA+NON-PROBE: NOT DETECTED
HCOV HKU1 RNA NPH QL NAA+NON-PROBE: NOT DETECTED
HCOV NL63 RNA NPH QL NAA+NON-PROBE: NOT DETECTED
HCOV OC43 RNA NPH QL NAA+NON-PROBE: NOT DETECTED
HCT VFR BLD AUTO: 31.8 % (ref 37–54)
HCT VFR BLD AUTO: 32.4 % (ref 37–54)
HGB BLD-MCNC: 9.5 G/DL (ref 12.5–16.5)
HGB BLD-MCNC: 9.7 G/DL (ref 12.5–16.5)
HMPV RNA NPH QL NAA+NON-PROBE: NOT DETECTED
HPIV1 RNA NPH QL NAA+NON-PROBE: NOT DETECTED
HPIV2 RNA NPH QL NAA+NON-PROBE: NOT DETECTED
HPIV3 RNA NPH QL NAA+NON-PROBE: NOT DETECTED
HPIV4 RNA NPH QL NAA+NON-PROBE: NOT DETECTED
IMM GRANULOCYTES # BLD AUTO: 0.07 K/UL (ref 0–0.58)
IMM GRANULOCYTES NFR BLD: 1 % (ref 0–5)
LAB: ABNORMAL
LACTATE BLDV-SCNC: 0.8 MMOL/L (ref 0.5–1.9)
LYMPHOCYTES NFR BLD: 1.31 K/UL (ref 1.5–4)
LYMPHOCYTES RELATIVE PERCENT: 11 % (ref 20–42)
Lab: 1550
Lab: 1902
Lab: 2230
M PNEUMO DNA NPH QL NAA+NON-PROBE: NOT DETECTED
MAGNESIUM SERPL-MCNC: 2 MG/DL (ref 1.6–2.6)
MCH RBC QN AUTO: 23.6 PG (ref 26–35)
MCH RBC QN AUTO: 24.6 PG (ref 26–35)
MCHC RBC AUTO-ENTMCNC: 29.3 G/DL (ref 32–34.5)
MCHC RBC AUTO-ENTMCNC: 30.5 G/DL (ref 32–34.5)
MCV RBC AUTO: 80.5 FL (ref 80–99.9)
MCV RBC AUTO: 80.6 FL (ref 80–99.9)
MODE: ABNORMAL
MONOCYTES NFR BLD: 1.02 K/UL (ref 0.1–0.95)
MONOCYTES NFR BLD: 8 % (ref 2–12)
NEUTROPHILS NFR BLD: 80 % (ref 43–80)
NEUTS SEG NFR BLD: 9.97 K/UL (ref 1.8–7.3)
O2 SATURATION: 92.8 % (ref 92–98.5)
O2 SATURATION: 94 % (ref 92–98.5)
O2 SATURATION: 95.1 % (ref 92–98.5)
OPERATOR ID: 467
PARTIAL THROMBOPLASTIN TIME: 32.9 SEC (ref 24.5–35.1)
PATIENT TEMP: 37 C
PCO2: 54.2 MMHG (ref 35–45)
PCO2: 55.3 MMHG (ref 35–45)
PCO2: 56.6 MMHG (ref 35–45)
PH BLOOD GAS: 7.32 (ref 7.35–7.45)
PH BLOOD GAS: 7.33 (ref 7.35–7.45)
PH BLOOD GAS: 7.34 (ref 7.35–7.45)
PLATELET # BLD AUTO: 218 K/UL (ref 130–450)
PLATELET # BLD AUTO: 233 K/UL (ref 130–450)
PMV BLD AUTO: 11.1 FL (ref 7–12)
PMV BLD AUTO: 11.4 FL (ref 7–12)
PO2: 70.7 MMHG (ref 75–100)
PO2: 76.1 MMHG (ref 75–100)
PO2: 81.2 MMHG (ref 75–100)
POTASSIUM SERPL-SCNC: 4.6 MMOL/L (ref 3.5–5)
PROT SERPL-MCNC: 7.8 G/DL (ref 6.4–8.3)
RBC # BLD AUTO: 3.95 M/UL (ref 3.8–5.8)
RBC # BLD AUTO: 4.02 M/UL (ref 3.8–5.8)
RSV RNA NPH QL NAA+NON-PROBE: NOT DETECTED
RV+EV RNA NPH QL NAA+NON-PROBE: NOT DETECTED
SARS-COV-2 RNA NPH QL NAA+NON-PROBE: NOT DETECTED
SODIUM SERPL-SCNC: 138 MMOL/L (ref 132–146)
SOURCE, BLOOD GAS: ABNORMAL
SPECIMEN DESCRIPTION: NORMAL
TIME ANALYZED: 1552
TIME ANALYZED: 1908
TIME ANALYZED: 2237
TROPONIN I SERPL HS-MCNC: 146 NG/L (ref 0–11)
TROPONIN I SERPL HS-MCNC: 155 NG/L (ref 0–11)
WBC OTHER # BLD: 11.6 K/UL (ref 4.5–11.5)
WBC OTHER # BLD: 12.5 K/UL (ref 4.5–11.5)

## 2024-07-28 PROCEDURE — 83735 ASSAY OF MAGNESIUM: CPT

## 2024-07-28 PROCEDURE — 94640 AIRWAY INHALATION TREATMENT: CPT

## 2024-07-28 PROCEDURE — 82803 BLOOD GASES ANY COMBINATION: CPT

## 2024-07-28 PROCEDURE — 2140000000 HC CCU INTERMEDIATE R&B

## 2024-07-28 PROCEDURE — 96366 THER/PROPH/DIAG IV INF ADDON: CPT

## 2024-07-28 PROCEDURE — 85730 THROMBOPLASTIN TIME PARTIAL: CPT

## 2024-07-28 PROCEDURE — 6370000000 HC RX 637 (ALT 250 FOR IP): Performed by: EMERGENCY MEDICINE

## 2024-07-28 PROCEDURE — 71045 X-RAY EXAM CHEST 1 VIEW: CPT

## 2024-07-28 PROCEDURE — 99285 EMERGENCY DEPT VISIT HI MDM: CPT

## 2024-07-28 PROCEDURE — 84484 ASSAY OF TROPONIN QUANT: CPT

## 2024-07-28 PROCEDURE — 2500000003 HC RX 250 WO HCPCS: Performed by: EMERGENCY MEDICINE

## 2024-07-28 PROCEDURE — 84145 PROCALCITONIN (PCT): CPT

## 2024-07-28 PROCEDURE — 96365 THER/PROPH/DIAG IV INF INIT: CPT

## 2024-07-28 PROCEDURE — 94664 DEMO&/EVAL PT USE INHALER: CPT

## 2024-07-28 PROCEDURE — 0202U NFCT DS 22 TRGT SARS-COV-2: CPT

## 2024-07-28 PROCEDURE — 6360000002 HC RX W HCPCS

## 2024-07-28 PROCEDURE — 82962 GLUCOSE BLOOD TEST: CPT

## 2024-07-28 PROCEDURE — 93005 ELECTROCARDIOGRAM TRACING: CPT

## 2024-07-28 PROCEDURE — 2580000003 HC RX 258: Performed by: EMERGENCY MEDICINE

## 2024-07-28 PROCEDURE — 85025 COMPLETE CBC W/AUTO DIFF WBC: CPT

## 2024-07-28 PROCEDURE — 96375 TX/PRO/DX INJ NEW DRUG ADDON: CPT

## 2024-07-28 PROCEDURE — 99223 1ST HOSP IP/OBS HIGH 75: CPT

## 2024-07-28 PROCEDURE — 94660 CPAP INITIATION&MGMT: CPT

## 2024-07-28 PROCEDURE — 85379 FIBRIN DEGRADATION QUANT: CPT

## 2024-07-28 PROCEDURE — 87040 BLOOD CULTURE FOR BACTERIA: CPT

## 2024-07-28 PROCEDURE — 80053 COMPREHEN METABOLIC PANEL: CPT

## 2024-07-28 PROCEDURE — 83605 ASSAY OF LACTIC ACID: CPT

## 2024-07-28 PROCEDURE — 85027 COMPLETE CBC AUTOMATED: CPT

## 2024-07-28 PROCEDURE — 6360000002 HC RX W HCPCS: Performed by: EMERGENCY MEDICINE

## 2024-07-28 RX ORDER — GLUCAGON 1 MG/ML
1 KIT INJECTION PRN
Status: DISCONTINUED | OUTPATIENT
Start: 2024-07-28 | End: 2024-08-02 | Stop reason: HOSPADM

## 2024-07-28 RX ORDER — HEPARIN SODIUM 10000 [USP'U]/100ML
5-30 INJECTION, SOLUTION INTRAVENOUS CONTINUOUS
Status: DISCONTINUED | OUTPATIENT
Start: 2024-07-28 | End: 2024-07-30

## 2024-07-28 RX ORDER — IPRATROPIUM BROMIDE AND ALBUTEROL SULFATE 2.5; .5 MG/3ML; MG/3ML
1 SOLUTION RESPIRATORY (INHALATION)
Status: DISCONTINUED | OUTPATIENT
Start: 2024-07-29 | End: 2024-08-02 | Stop reason: HOSPADM

## 2024-07-28 RX ORDER — DEXTROSE MONOHYDRATE 100 MG/ML
INJECTION, SOLUTION INTRAVENOUS CONTINUOUS PRN
Status: DISCONTINUED | OUTPATIENT
Start: 2024-07-28 | End: 2024-08-02 | Stop reason: HOSPADM

## 2024-07-28 RX ORDER — SODIUM CHLORIDE 9 MG/ML
INJECTION, SOLUTION INTRAVENOUS PRN
Status: DISCONTINUED | OUTPATIENT
Start: 2024-07-28 | End: 2024-08-02 | Stop reason: HOSPADM

## 2024-07-28 RX ORDER — HEPARIN SODIUM 1000 [USP'U]/ML
5000 INJECTION, SOLUTION INTRAVENOUS; SUBCUTANEOUS PRN
Status: DISCONTINUED | OUTPATIENT
Start: 2024-07-28 | End: 2024-07-30 | Stop reason: SDUPTHER

## 2024-07-28 RX ORDER — SODIUM CHLORIDE 9 MG/ML
INJECTION, SOLUTION INTRAVENOUS CONTINUOUS
Status: DISCONTINUED | OUTPATIENT
Start: 2024-07-28 | End: 2024-07-28

## 2024-07-28 RX ORDER — GUAIFENESIN/DEXTROMETHORPHAN 100-10MG/5
5 SYRUP ORAL EVERY 4 HOURS PRN
Status: DISCONTINUED | OUTPATIENT
Start: 2024-07-28 | End: 2024-08-02 | Stop reason: HOSPADM

## 2024-07-28 RX ORDER — ONDANSETRON 4 MG/1
4 TABLET, ORALLY DISINTEGRATING ORAL EVERY 8 HOURS PRN
Status: DISCONTINUED | OUTPATIENT
Start: 2024-07-28 | End: 2024-08-02 | Stop reason: HOSPADM

## 2024-07-28 RX ORDER — MAGNESIUM HYDROXIDE/ALUMINUM HYDROXICE/SIMETHICONE 120; 1200; 1200 MG/30ML; MG/30ML; MG/30ML
30 SUSPENSION ORAL EVERY 6 HOURS PRN
Status: DISCONTINUED | OUTPATIENT
Start: 2024-07-28 | End: 2024-08-02 | Stop reason: HOSPADM

## 2024-07-28 RX ORDER — POLYETHYLENE GLYCOL 3350 17 G/17G
17 POWDER, FOR SOLUTION ORAL DAILY PRN
Status: DISCONTINUED | OUTPATIENT
Start: 2024-07-28 | End: 2024-08-02 | Stop reason: HOSPADM

## 2024-07-28 RX ORDER — INSULIN LISPRO 100 [IU]/ML
0-8 INJECTION, SOLUTION INTRAVENOUS; SUBCUTANEOUS
Status: DISCONTINUED | OUTPATIENT
Start: 2024-07-29 | End: 2024-08-02 | Stop reason: HOSPADM

## 2024-07-28 RX ORDER — HEPARIN SODIUM 1000 [USP'U]/ML
10000 INJECTION, SOLUTION INTRAVENOUS; SUBCUTANEOUS PRN
Status: DISCONTINUED | OUTPATIENT
Start: 2024-07-28 | End: 2024-07-30 | Stop reason: SDUPTHER

## 2024-07-28 RX ORDER — HEPARIN SODIUM 10000 [USP'U]/100ML
5-30 INJECTION, SOLUTION INTRAVENOUS CONTINUOUS
Status: DISCONTINUED | OUTPATIENT
Start: 2024-07-28 | End: 2024-07-28 | Stop reason: CLARIF

## 2024-07-28 RX ORDER — HYDRALAZINE HYDROCHLORIDE 20 MG/ML
10 INJECTION INTRAMUSCULAR; INTRAVENOUS EVERY 6 HOURS PRN
Status: DISCONTINUED | OUTPATIENT
Start: 2024-07-28 | End: 2024-07-29

## 2024-07-28 RX ORDER — ACETAMINOPHEN 650 MG/1
650 SUPPOSITORY RECTAL EVERY 6 HOURS PRN
Status: DISCONTINUED | OUTPATIENT
Start: 2024-07-28 | End: 2024-08-02 | Stop reason: HOSPADM

## 2024-07-28 RX ORDER — SODIUM CHLORIDE 0.9 % (FLUSH) 0.9 %
5-40 SYRINGE (ML) INJECTION PRN
Status: DISCONTINUED | OUTPATIENT
Start: 2024-07-28 | End: 2024-08-02 | Stop reason: HOSPADM

## 2024-07-28 RX ORDER — SODIUM CHLORIDE 0.9 % (FLUSH) 0.9 %
5-40 SYRINGE (ML) INJECTION EVERY 12 HOURS SCHEDULED
Status: DISCONTINUED | OUTPATIENT
Start: 2024-07-28 | End: 2024-08-02 | Stop reason: HOSPADM

## 2024-07-28 RX ORDER — IPRATROPIUM BROMIDE AND ALBUTEROL SULFATE 2.5; .5 MG/3ML; MG/3ML
3 SOLUTION RESPIRATORY (INHALATION) ONCE
Status: COMPLETED | OUTPATIENT
Start: 2024-07-28 | End: 2024-07-28

## 2024-07-28 RX ORDER — ONDANSETRON 2 MG/ML
4 INJECTION INTRAMUSCULAR; INTRAVENOUS EVERY 6 HOURS PRN
Status: DISCONTINUED | OUTPATIENT
Start: 2024-07-28 | End: 2024-08-02 | Stop reason: HOSPADM

## 2024-07-28 RX ORDER — 0.9 % SODIUM CHLORIDE 0.9 %
500 INTRAVENOUS SOLUTION INTRAVENOUS ONCE
Status: COMPLETED | OUTPATIENT
Start: 2024-07-28 | End: 2024-07-29

## 2024-07-28 RX ORDER — INSULIN GLARGINE 100 [IU]/ML
30 INJECTION, SOLUTION SUBCUTANEOUS NIGHTLY
Status: DISCONTINUED | OUTPATIENT
Start: 2024-07-28 | End: 2024-08-02 | Stop reason: HOSPADM

## 2024-07-28 RX ORDER — HEPARIN SODIUM 1000 [USP'U]/ML
10000 INJECTION, SOLUTION INTRAVENOUS; SUBCUTANEOUS ONCE
Status: COMPLETED | OUTPATIENT
Start: 2024-07-28 | End: 2024-07-28

## 2024-07-28 RX ORDER — INSULIN LISPRO 100 [IU]/ML
0-4 INJECTION, SOLUTION INTRAVENOUS; SUBCUTANEOUS NIGHTLY
Status: DISCONTINUED | OUTPATIENT
Start: 2024-07-28 | End: 2024-08-02 | Stop reason: HOSPADM

## 2024-07-28 RX ORDER — ACETAMINOPHEN 325 MG/1
650 TABLET ORAL EVERY 6 HOURS PRN
Status: DISCONTINUED | OUTPATIENT
Start: 2024-07-28 | End: 2024-08-02 | Stop reason: HOSPADM

## 2024-07-28 RX ORDER — DEXAMETHASONE SODIUM PHOSPHATE 10 MG/ML
10 INJECTION INTRAMUSCULAR; INTRAVENOUS ONCE
Status: COMPLETED | OUTPATIENT
Start: 2024-07-28 | End: 2024-07-28

## 2024-07-28 RX ORDER — ENOXAPARIN SODIUM 100 MG/ML
40 INJECTION SUBCUTANEOUS DAILY
Status: DISCONTINUED | OUTPATIENT
Start: 2024-07-28 | End: 2024-07-28

## 2024-07-28 RX ORDER — HEPARIN SODIUM 5000 [USP'U]/ML
5000 INJECTION, SOLUTION INTRAVENOUS; SUBCUTANEOUS EVERY 8 HOURS SCHEDULED
Status: DISCONTINUED | OUTPATIENT
Start: 2024-07-28 | End: 2024-07-28

## 2024-07-28 RX ADMIN — DEXAMETHASONE SODIUM PHOSPHATE 10 MG: 10 INJECTION INTRAMUSCULAR; INTRAVENOUS at 20:45

## 2024-07-28 RX ADMIN — DOXYCYCLINE 100 MG: 100 INJECTION, POWDER, LYOPHILIZED, FOR SOLUTION INTRAVENOUS at 20:44

## 2024-07-28 RX ADMIN — HYDRALAZINE HYDROCHLORIDE 10 MG: 20 INJECTION INTRAMUSCULAR; INTRAVENOUS at 21:51

## 2024-07-28 RX ADMIN — SODIUM CHLORIDE 500 ML: 9 INJECTION, SOLUTION INTRAVENOUS at 20:48

## 2024-07-28 RX ADMIN — HEPARIN SODIUM 10000 UNITS: 1000 INJECTION INTRAVENOUS; SUBCUTANEOUS at 23:35

## 2024-07-28 RX ADMIN — CEFEPIME 2000 MG: 2 INJECTION, POWDER, FOR SOLUTION INTRAVENOUS at 17:26

## 2024-07-28 RX ADMIN — HEPARIN SODIUM 15 UNITS/KG/HR: 10000 INJECTION, SOLUTION INTRAVENOUS at 23:37

## 2024-07-28 RX ADMIN — IPRATROPIUM BROMIDE AND ALBUTEROL SULFATE 3 DOSE: 2.5; .5 SOLUTION RESPIRATORY (INHALATION) at 20:50

## 2024-07-28 ASSESSMENT — PAIN - FUNCTIONAL ASSESSMENT: PAIN_FUNCTIONAL_ASSESSMENT: NONE - DENIES PAIN

## 2024-07-28 NOTE — PROCEDURES
PROCEDURE NOTE  Date: 7/28/2024   Name: Florentin Stark  YOB: 1955    Procedures    PATIENTS GFR IS 26 WILL NEED WAIVER OR SCANS ORDERED WO CONTRAST

## 2024-07-28 NOTE — ED PROVIDER NOTES
Department of Emergency Medicine   ED Provider Note  Admit Date/RoomTime: 7/28/2024  2:02 PM  ED Room: 6422/6422-A          History of Present Illness:  7/28/24, Time: 2:43 PM EDT       Florentin Stark is a 68 y.o. male presenting to the ED for SOB and cough. Sx onset today.  He notes he has been having cough as well productive of mucus.  Does describe some chest pain.  No abd pain, N/V/D, dysuria, hematuria, constipation. Does have hx of wound on left lateral ankle.  Is on 4 L nasal cannula at baseline however acute is requiring 6 L nasal cannula.  On initial examination, oxygen saturations 91% on 6 L nasal cannula.    Review of Systems:     Pertinent positives and negatives are stated within HPI.      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[facility]), Acute kidney injury (HCC), AKA stump complication ([facility]), 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 ([facility]), Atherosclerosis of native artery of left leg with ulceration of ankle (HCC), 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 ([facility]), Cellulitis, scrotum, Coagulopathy ([facility]), Critical lower limb ischemia (HCC), Diabetes mellitus (HCC), Diabetic foot infection (HCC), 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, with necrosis of bone (HCC), 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 2 diabetes mellitus, with fat layer exposed ([facility]), Disruption of closure of muscle or muscle flap, sequela, DVT, lower extremity ([facility]), Encounter for dental examination and cleaning with abnormal findings, Gas gangrene of thigh ([facility]), History of DVT (deep vein thrombosis), Hyperglycemia due to type 2 diabetes mellitus ([facility]), Hyperlipidemia, Hypertension, Ischemic ulcer of right thigh with fat layer exposed (HCC), Ischemic ulcer of right  atraumatic.      Mouth/Throat:      Mouth: Mucous membranes are moist.      Pharynx: Oropharynx is clear.   Eyes:      Extraocular Movements: Extraocular movements intact.      Conjunctiva/sclera: Conjunctivae normal.      Pupils: Pupils are equal, round, and reactive to light.   Cardiovascular:      Rate and Rhythm: Normal rate and regular rhythm.      Pulses:           Radial pulses are 2+ on the right side and 2+ on the left side.        Dorsalis pedis pulses are 2+ on the left side.        Posterior tibial pulses are 2+ on the left side.      Heart sounds: Normal heart sounds.   Pulmonary:      Effort: Pulmonary effort is normal.      Breath sounds: Normal breath sounds.   Abdominal:      General: Abdomen is flat.      Palpations: Abdomen is soft.   Musculoskeletal:      Cervical back: Normal range of motion and neck supple.      Left lower leg: Edema present.      Comments: Lateral left ankle with chronic wound      Right Lower Extremity: Right leg is amputated above knee.   Skin:     General: Skin is warm and dry.   Neurological:      General: No focal deficit present.      Mental Status: He is alert and oriented to person, place, and time.   Psychiatric:         Mood and Affect: Mood normal.         Behavior: Behavior normal.         -------------------------------------------------- RESULTS -------------------------------------------------  I have personally reviewed and interpreted all laboratory and imaging results for this patient. Results are listed below.     LABS:  Results for orders placed or performed during the hospital encounter of 07/28/24   Respiratory Panel, Molecular, with COVID-19 (Restricted: peds pts or suitable admitted adults)    Specimen: Nasopharyngeal Swab   Result Value Ref Range    Specimen Description .NASOPHARYNGEAL SWAB     Adenovirus PCR Not Detected Not Detected    Coronavirus 229E PCR Not Detected Not Detected    Coronavirus HKU1 PCR Not Detected Not Detected    Coronavirus NL63  Given 7/28/24 2341)   glucose chewable tablet 16 g (has no administration in time range)   dextrose bolus 10% 125 mL (has no administration in time range)     Or   dextrose bolus 10% 250 mL (has no administration in time range)   glucagon injection 1 mg (has no administration in time range)   dextrose 10 % infusion (has no administration in time range)   heparin (porcine) injection 10,000 Units (has no administration in time range)   heparin (porcine) injection 5,000 Units (has no administration in time range)   perflutren lipid microspheres (DEFINITY) injection 1.5 mL (has no administration in time range)   heparin 25,000 units in dextrose 5% 250 mL (premix) infusion (15 Units/kg/hr × 136.3 kg IntraVENous New Bag 7/29/24 1243)   hydrALAZINE (APRESOLINE) injection 10 mg (10 mg IntraVENous Given 7/29/24 0300)   labetalol (NORMODYNE;TRANDATE) injection 10 mg (10 mg IntraVENous Given 7/29/24 0517)   ceFEPIme (MAXIPIME) 2,000 mg in sodium chloride 0.9 % 100 mL IVPB ([id number]) (0 mg IntraVENous Stopped 7/29/24 0103)   doxycycline (VIBRAMYCIN) 100 mg in sodium chloride 0.9 % 100 mL IVPB ([id number]) (0 mg IntraVENous Stopped 7/28/24 2338)   dexAMETHasone (DECADRON) injection 10 mg (10 mg IntraVENous Given 7/28/24 2045)   ipratropium 0.5 mg-albuterol 2.5 mg (DUONEB) nebulizer solution 3 Dose (3 Doses Inhalation Given 7/28/24 2050)   sodium chloride 0.9 % bolus 500 mL (0 mLs IntraVENous Stopped 7/29/24 0411)   heparin (porcine) injection 10,000 Units (10,000 Units IntraVENous Given 7/28/24 2335)            Medical Decision Making:     ED Course as of 07/29/24 1324   Sun Jul 28, 2024   1613 EKG @ 1609:  This EKG is signed and interpreted by Dr Omar Moran DO.    Rate: 85  Rhythm: Atrial fibrillation  Interpretation: Atrial fibrillation with controlled ventricular response, QRS is 96, QTc is 476, does not meet STEMI criteria, mild motion artifact instrumentation, nonspecific, now appears to be an underlying A-fib as

## 2024-07-29 ENCOUNTER — APPOINTMENT (OUTPATIENT)
Dept: NUCLEAR MEDICINE | Age: 69
End: 2024-07-29
Payer: MEDICARE

## 2024-07-29 LAB
AADO2: 268.9 MMHG
ALBUMIN SERPL-MCNC: 3.1 G/DL (ref 3.5–5.2)
ALP SERPL-CCNC: 99 U/L (ref 40–129)
ALT SERPL-CCNC: 12 U/L (ref 0–40)
ANION GAP SERPL CALCULATED.3IONS-SCNC: 11 MMOL/L (ref 7–16)
ANION GAP SERPL CALCULATED.3IONS-SCNC: 13 MMOL/L (ref 7–16)
AST SERPL-CCNC: 17 U/L (ref 0–39)
B.E.: -0.1 MMOL/L (ref -3–3)
BACTERIA URNS QL MICRO: ABNORMAL
BASOPHILS # BLD: 0.02 K/UL (ref 0–0.2)
BASOPHILS NFR BLD: 0 % (ref 0–2)
BILIRUB SERPL-MCNC: 0.2 MG/DL (ref 0–1.2)
BILIRUB UR QL STRIP: NEGATIVE
BNP SERPL-MCNC: 2074 PG/ML (ref 0–125)
BUN SERPL-MCNC: 57 MG/DL (ref 6–23)
BUN SERPL-MCNC: 60 MG/DL (ref 6–23)
CALCIUM SERPL-MCNC: 8.7 MG/DL (ref 8.6–10.2)
CALCIUM SERPL-MCNC: 8.8 MG/DL (ref 8.6–10.2)
CHLORIDE SERPL-SCNC: 102 MMOL/L (ref 98–107)
CHLORIDE SERPL-SCNC: 104 MMOL/L (ref 98–107)
CLARITY UR: CLEAR
CO2 SERPL-SCNC: 25 MMOL/L (ref 22–29)
CO2 SERPL-SCNC: 27 MMOL/L (ref 22–29)
COLOR UR: YELLOW
CREAT SERPL-MCNC: 2.2 MG/DL (ref 0.7–1.2)
CREAT SERPL-MCNC: 2.3 MG/DL (ref 0.7–1.2)
CREAT UR-MCNC: 94.7 MG/DL (ref 40–278)
CRITICAL: ABNORMAL
DATE ANALYZED: ABNORMAL
DATE OF COLLECTION: ABNORMAL
EOSINOPHIL # BLD: 0 K/UL (ref 0.05–0.5)
EOSINOPHILS RELATIVE PERCENT: 0 % (ref 0–6)
ERYTHROCYTE [DISTWIDTH] IN BLOOD BY AUTOMATED COUNT: 16.5 % (ref 11.5–15)
FIO2: 60 %
GFR, ESTIMATED: 31 ML/MIN/1.73M2
GFR, ESTIMATED: 33 ML/MIN/1.73M2
GLUCOSE BLD-MCNC: 122 MG/DL (ref 74–99)
GLUCOSE BLD-MCNC: 124 MG/DL (ref 74–99)
GLUCOSE BLD-MCNC: 134 MG/DL (ref 74–99)
GLUCOSE BLD-MCNC: 159 MG/DL (ref 74–99)
GLUCOSE BLD-MCNC: 190 MG/DL (ref 74–99)
GLUCOSE SERPL-MCNC: 111 MG/DL (ref 74–99)
GLUCOSE SERPL-MCNC: 174 MG/DL (ref 74–99)
GLUCOSE UR STRIP-MCNC: NEGATIVE MG/DL
HCO3: 26.9 MMOL/L (ref 22–26)
HCT VFR BLD AUTO: 30.8 % (ref 37–54)
HGB BLD-MCNC: 9.2 G/DL (ref 12.5–16.5)
HGB UR QL STRIP.AUTO: NEGATIVE
IMM GRANULOCYTES # BLD AUTO: 0.07 K/UL (ref 0–0.58)
IMM GRANULOCYTES NFR BLD: 1 % (ref 0–5)
KETONES UR STRIP-MCNC: NEGATIVE MG/DL
L PNEUMO1 AG UR QL IA.RAPID: NEGATIVE
LAB: ABNORMAL
LEUKOCYTE ESTERASE UR QL STRIP: NEGATIVE
LYMPHOCYTES NFR BLD: 0.62 K/UL (ref 1.5–4)
LYMPHOCYTES RELATIVE PERCENT: 5 % (ref 20–42)
Lab: 309
MCH RBC QN AUTO: 24.3 PG (ref 26–35)
MCHC RBC AUTO-ENTMCNC: 29.9 G/DL (ref 32–34.5)
MCV RBC AUTO: 81.5 FL (ref 80–99.9)
MODE: ABNORMAL
MONOCYTES NFR BLD: 0.21 K/UL (ref 0.1–0.95)
MONOCYTES NFR BLD: 2 % (ref 2–12)
NEUTROPHILS NFR BLD: 92 % (ref 43–80)
NEUTS SEG NFR BLD: 10.61 K/UL (ref 1.8–7.3)
NITRITE UR QL STRIP: NEGATIVE
O2 SATURATION: 95.6 % (ref 92–98.5)
OPERATOR ID: 8219
OSMOLALITY UR: 422 MOSM/KG (ref 300–900)
PARTIAL THROMBOPLASTIN TIME: 61.1 SEC (ref 24.5–35.1)
PARTIAL THROMBOPLASTIN TIME: 72.4 SEC (ref 24.5–35.1)
PARTIAL THROMBOPLASTIN TIME: 75 SEC (ref 24.5–35.1)
PATIENT TEMP: 37 C
PCO2: 53.2 MMHG (ref 35–45)
PEEP/CPAP: 8 CMH2O
PFO2: 1.42 MMHG/%
PH BLOOD GAS: 7.32 (ref 7.35–7.45)
PH UR STRIP: 6 [PH] (ref 5–9)
PIP: 12 CMH2O
PLATELET # BLD AUTO: 220 K/UL (ref 130–450)
PMV BLD AUTO: 11.8 FL (ref 7–12)
PO2: 85.4 MMHG (ref 75–100)
POTASSIUM SERPL-SCNC: 4.2 MMOL/L (ref 3.5–5)
POTASSIUM SERPL-SCNC: 4.8 MMOL/L (ref 3.5–5)
PROCALCITONIN SERPL-MCNC: 0.21 NG/ML (ref 0–0.08)
PROT SERPL-MCNC: 7.4 G/DL (ref 6.4–8.3)
PROT UR STRIP-MCNC: 30 MG/DL
RBC # BLD AUTO: 3.78 M/UL (ref 3.8–5.8)
RBC #/AREA URNS HPF: ABNORMAL /HPF
RI(T): 3.15
S PNEUM AG SPEC QL: NEGATIVE
SODIUM SERPL-SCNC: 140 MMOL/L (ref 132–146)
SODIUM SERPL-SCNC: 142 MMOL/L (ref 132–146)
SODIUM UR-SCNC: 40 MMOL/L
SOURCE, BLOOD GAS: ABNORMAL
SP GR UR STRIP: 1.02 (ref 1–1.03)
SPECIMEN SOURCE: NORMAL
TIME ANALYZED: 317
UROBILINOGEN UR STRIP-ACNC: 0.2 EU/DL (ref 0–1)
UUN UR-MCNC: 663 MG/DL (ref 800–1666)
WBC #/AREA URNS HPF: ABNORMAL /HPF
WBC OTHER # BLD: 11.5 K/UL (ref 4.5–11.5)

## 2024-07-29 PROCEDURE — 84300 ASSAY OF URINE SODIUM: CPT

## 2024-07-29 PROCEDURE — 87899 AGENT NOS ASSAY W/OPTIC: CPT

## 2024-07-29 PROCEDURE — 94640 AIRWAY INHALATION TREATMENT: CPT

## 2024-07-29 PROCEDURE — 6360000002 HC RX W HCPCS

## 2024-07-29 PROCEDURE — 36415 COLL VENOUS BLD VENIPUNCTURE: CPT

## 2024-07-29 PROCEDURE — 83935 ASSAY OF URINE OSMOLALITY: CPT

## 2024-07-29 PROCEDURE — 81001 URINALYSIS AUTO W/SCOPE: CPT

## 2024-07-29 PROCEDURE — 99232 SBSQ HOSP IP/OBS MODERATE 35: CPT | Performed by: INTERNAL MEDICINE

## 2024-07-29 PROCEDURE — 85025 COMPLETE CBC W/AUTO DIFF WBC: CPT

## 2024-07-29 PROCEDURE — 94660 CPAP INITIATION&MGMT: CPT

## 2024-07-29 PROCEDURE — 2140000000 HC CCU INTERMEDIATE R&B

## 2024-07-29 PROCEDURE — 80048 BASIC METABOLIC PNL TOTAL CA: CPT

## 2024-07-29 PROCEDURE — 6370000000 HC RX 637 (ALT 250 FOR IP)

## 2024-07-29 PROCEDURE — 83880 ASSAY OF NATRIURETIC PEPTIDE: CPT

## 2024-07-29 PROCEDURE — 2580000003 HC RX 258: Performed by: INTERNAL MEDICINE

## 2024-07-29 PROCEDURE — 82962 GLUCOSE BLOOD TEST: CPT

## 2024-07-29 PROCEDURE — 87449 NOS EACH ORGANISM AG IA: CPT

## 2024-07-29 PROCEDURE — 85730 THROMBOPLASTIN TIME PARTIAL: CPT

## 2024-07-29 PROCEDURE — 2580000003 HC RX 258

## 2024-07-29 PROCEDURE — 80053 COMPREHEN METABOLIC PANEL: CPT

## 2024-07-29 PROCEDURE — 5A09457 ASSISTANCE WITH RESPIRATORY VENTILATION, 24-96 CONSECUTIVE HOURS, CONTINUOUS POSITIVE AIRWAY PRESSURE: ICD-10-PCS | Performed by: INTERNAL MEDICINE

## 2024-07-29 PROCEDURE — 2500000003 HC RX 250 WO HCPCS

## 2024-07-29 PROCEDURE — 84540 ASSAY OF URINE/UREA-N: CPT

## 2024-07-29 PROCEDURE — 82803 BLOOD GASES ANY COMBINATION: CPT

## 2024-07-29 PROCEDURE — 6360000002 HC RX W HCPCS: Performed by: INTERNAL MEDICINE

## 2024-07-29 PROCEDURE — 82570 ASSAY OF URINE CREATININE: CPT

## 2024-07-29 RX ORDER — HYDRALAZINE HYDROCHLORIDE 20 MG/ML
10 INJECTION INTRAMUSCULAR; INTRAVENOUS EVERY 4 HOURS PRN
Status: DISCONTINUED | OUTPATIENT
Start: 2024-07-29 | End: 2024-08-02 | Stop reason: HOSPADM

## 2024-07-29 RX ORDER — GUAIFENESIN 400 MG/1
400 TABLET ORAL 3 TIMES DAILY
Status: DISCONTINUED | OUTPATIENT
Start: 2024-07-29 | End: 2024-08-02 | Stop reason: HOSPADM

## 2024-07-29 RX ORDER — BLOOD SUGAR DIAGNOSTIC
STRIP MISCELLANEOUS
Qty: 200 EACH | Refills: 10 | Status: SHIPPED | OUTPATIENT
Start: 2024-07-29

## 2024-07-29 RX ORDER — LABETALOL HYDROCHLORIDE 5 MG/ML
10 INJECTION, SOLUTION INTRAVENOUS EVERY 4 HOURS PRN
Status: DISCONTINUED | OUTPATIENT
Start: 2024-07-29 | End: 2024-08-02 | Stop reason: HOSPADM

## 2024-07-29 RX ADMIN — SODIUM CHLORIDE, PRESERVATIVE FREE 10 ML: 5 INJECTION INTRAVENOUS at 17:25

## 2024-07-29 RX ADMIN — CEFEPIME 2000 MG: 2 INJECTION, POWDER, FOR SOLUTION INTRAVENOUS at 06:20

## 2024-07-29 RX ADMIN — HEPARIN SODIUM 15 UNITS/KG/HR: 10000 INJECTION, SOLUTION INTRAVENOUS at 12:43

## 2024-07-29 RX ADMIN — IPRATROPIUM BROMIDE AND ALBUTEROL SULFATE 1 DOSE: 2.5; .5 SOLUTION RESPIRATORY (INHALATION) at 14:00

## 2024-07-29 RX ADMIN — CEFEPIME 2000 MG: 2 INJECTION, POWDER, FOR SOLUTION INTRAVENOUS at 16:36

## 2024-07-29 RX ADMIN — LABETALOL HYDROCHLORIDE 10 MG: 5 INJECTION INTRAVENOUS at 05:17

## 2024-07-29 RX ADMIN — BUMETANIDE 0.5 MG/HR: 0.25 INJECTION INTRAMUSCULAR; INTRAVENOUS at 20:30

## 2024-07-29 RX ADMIN — DOXYCYCLINE 100 MG: 100 INJECTION, POWDER, LYOPHILIZED, FOR SOLUTION INTRAVENOUS at 12:45

## 2024-07-29 RX ADMIN — IPRATROPIUM BROMIDE AND ALBUTEROL SULFATE 1 DOSE: 2.5; .5 SOLUTION RESPIRATORY (INHALATION) at 08:13

## 2024-07-29 RX ADMIN — SODIUM CHLORIDE, PRESERVATIVE FREE 10 ML: 5 INJECTION INTRAVENOUS at 11:16

## 2024-07-29 RX ADMIN — LABETALOL HYDROCHLORIDE 10 MG: 5 INJECTION INTRAVENOUS at 17:25

## 2024-07-29 RX ADMIN — IPRATROPIUM BROMIDE AND ALBUTEROL SULFATE 1 DOSE: 2.5; .5 SOLUTION RESPIRATORY (INHALATION) at 19:59

## 2024-07-29 RX ADMIN — LABETALOL HYDROCHLORIDE 10 MG: 5 INJECTION INTRAVENOUS at 23:49

## 2024-07-29 RX ADMIN — SODIUM CHLORIDE, PRESERVATIVE FREE 10 ML: 5 INJECTION INTRAVENOUS at 04:10

## 2024-07-29 RX ADMIN — DOXYCYCLINE 100 MG: 100 INJECTION, POWDER, LYOPHILIZED, FOR SOLUTION INTRAVENOUS at 23:30

## 2024-07-29 RX ADMIN — SODIUM CHLORIDE, PRESERVATIVE FREE 10 ML: 5 INJECTION INTRAVENOUS at 21:50

## 2024-07-29 RX ADMIN — LABETALOL HYDROCHLORIDE 10 MG: 5 INJECTION INTRAVENOUS at 01:14

## 2024-07-29 RX ADMIN — HYDRALAZINE HYDROCHLORIDE 10 MG: 20 INJECTION INTRAMUSCULAR; INTRAVENOUS at 03:00

## 2024-07-29 ASSESSMENT — ENCOUNTER SYMPTOMS
CHEST TIGHTNESS: 1
COUGH: 1
SHORTNESS OF BREATH: 1
VOMITING: 0
ABDOMINAL PAIN: 0
NAUSEA: 0
EYES NEGATIVE: 1
COLOR CHANGE: 0

## 2024-07-29 NOTE — PLAN OF CARE
Problem: Chronic Conditions and Co-morbidities  Goal: Patient's chronic conditions and co-morbidity symptoms are monitored and maintained or improved  Outcome: Progressing     Problem: Discharge Planning  Goal: Discharge to home or other facility with appropriate resources  Outcome: Progressing  Flowsheets (Taken 7/29/2024 0653 by Mike Hernandez, RN)  Discharge to home or other facility with appropriate resources:   Identify barriers to discharge with patient and caregiver   Identify discharge learning needs (meds, wound care, etc)     Problem: Safety - Adult  Goal: Free from fall injury  Outcome: Progressing  Flowsheets (Taken 7/29/2024 0650 by Mike Hernandez, RN)  Free From Fall Injury: Instruct family/caregiver on patient safety     Problem: ABCDS Injury Assessment  Goal: Absence of physical injury  Outcome: Progressing     Problem: Skin/Tissue Integrity  Goal: Absence of new skin breakdown  Description: 1.  Monitor for areas of redness and/or skin breakdown  2.  Assess vascular access sites hourly  3.  Every 4-6 hours minimum:  Change oxygen saturation probe site  4.  Every 4-6 hours:  If on nasal continuous positive airway pressure, respiratory therapy assess nares and determine need for appliance change or resting period.  Outcome: Progressing

## 2024-07-29 NOTE — PROGRESS NOTES
[facility] Hospitalist Progress Note      Synopsis: Patient with a history of diabetes, PAD, AKA, chronic hypoxic respiratory failure on 4 L at baseline, admitted on 7/28/2024 after presenting to the ED with shortness of breath and increased oxygen requirements.  Patient has a history of left foot wound with infection which he follows with vascular and podiatry for as well as a right AKA.  Patient found to have community-acquired pneumonia as well as COLE on CKD.  Started on antibiotics, continues with oxygen support.  Await VQ scan to rule out PE    Subjective  Continues to be short of breath however reports improvement  More shaky and anxious today    Exam:  BP (!) 176/61   Pulse 71   Temp 98.1 °F (36.7 °C) (Temporal)   Resp 18   Ht 1.88 m (6' 2\")   Wt (!) 136.3 kg (300 lb 9.5 oz)   SpO2 97%   BMI 38.59 kg/m²   General appearance: No apparent distress, appears stated age and cooperative.  HEENT: Conjunctivae/corneas clear. Mucous membranes moist.  Neck: Supple. No JVD.  Respiratory: Rhonchi in upper airway and crackles in bases.  Normal respiratory effort.   Cardiovascular:  RRR. S1, S2 without MRG.  PV: Pulses palpable. No edema.   Abdomen: Soft, non-tender, non-distended. +BS  Musculoskeletal: Right AKA otherwise no other obvious deformities.   Skin: Vascular discoloration to left lower extremity, left foot wound with dressing dry and intact  Neurologic:  Grossly non-focal. Awake, alert, following commands.   Psychiatric: Alert and oriented, thought content appropriate, normal insight and judgement    Medications:  Reviewed    Infusion Medications    sodium chloride      dextrose      heparin (PORCINE) Infusion 15 Units/kg/hr (07/29/24 0612)     Scheduled Medications    sodium chloride flush  5-40 mL IntraVENous 2 times per day    ipratropium 0.5 mg-albuterol 2.5 mg  1 Dose Inhalation Q4H WA RT    cefepime  2,000 mg IntraVENous Q12H    doxycycline (VIBRAMYCIN) IV  100 mg IntraVENous 2 times per day

## 2024-07-29 NOTE — PROGRESS NOTES
4 Eyes Skin Assessment     NAME:  Florentin Stark  YOB: 1955  MEDICAL RECORD NUMBER:  [id number]    The patient is being assessed for  Admission    I agree that at least one RN has performed a thorough Head to Toe Skin Assessment on the patient. ALL assessment sites listed below have been assessed.      Areas assessed by both nurses:    Head, Face, Ears, Shoulders, Back, Chest, Arms, Elbows, Hands, Sacrum. Buttock, Coccyx, Ischium, Legs. Feet and Heels, and Under Medical Devices , wound on the LLE, L heel 8cm*4cm*0.2cm, open wound on the coccyx, 3.5cm*0.1cm*0.1cm          Does the Patient have a Wound? Yes wound(s) were present on assessment. LDA wound assessment was Initiated and completed by RN       Tarun Prevention initiated by RN: Yes  Wound Care Orders initiated by RN: Yes    Pressure Injury (Stage 3,4, Unstageable, DTI, NWPT, and Complex wounds) if present, place Wound referral order by RN under : Yes    New Ostomies, if present place, Ostomy referral order under : No     Nurse 1 eSignature: Electronically signed by Patrick Cavazos RN on 7/29/24 at 6:38 AM EDT    **SHARE this note so that the co-signing nurse can place an eSignature**    Nurse 2 eSignature: Electronically signed by Tammi Eastman RN on 7/29/24 at 7:18 AM EDT

## 2024-07-29 NOTE — CONSULTS
Venkata Espinoza M.D.  Jesse Jarrell D.O.  Miles Camarillo M.D.  Nancy Hogan M.D.  Phil Erwin D.O.  Agustín Evans M.D.       Patient:  Florentin Stark 68 y.o. male MRN: [id number]     Date of Service: 7/29/2024      PULMONARY CONSULTATION    Reason for Consultation: pneumonia  Referring Physician: Mary Landers MD    Chief Complaint   Patient presents with    Cough    Shortness of Breath     tremors         Code Status: Full Code        SUBJECTIVE:    HPI:  This is a 68-year-old male with history of chronic hypoxic respiratory failure on 4 L, RAGHU, DM, PAD s/p right AKA, tobacco use that presents with worsening shortness of breath and cough.    Patient poor historian.    Tells me he has been feeling unwell for past couple weeks prior to admission.  He has been feeling increasing fatigue.  Having low-grade fevers.  Having productive cough with yellow sputum.  Increased dyspnea and chest heaviness.  Tells me he has supplemental O2, but not very clear on how long he had.  Also has history of RAGHU and has CPAP device.  On admission patient started on BiPAP.  Has been on antibiotics for possible pneumonia.  Worsening renal function from his baseline.      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[facility]) 01/16/2020    Acute kidney injury ([facility]) 05/18/2020    AKA stump complication ([facility]) 05/12/2021    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 ([facility]) 05/22/2018    Atherosclerosis of native artery of left leg with ulceration of ankle ([facility]) 09/29/2023    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 ([facility]) 05/21/2018    Cellulitis, scrotum 03/20/2020    Coagulopathy ([facility]) 05/21/2018    Critical lower limb ischemia ([facility]) 03/20/2020    Diabetes mellitus ([facility])     Diabetic foot infection ([facility]) 09/26/2023    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, with necrosis of bone ([facility]) 11/15/2023     MG tablet, TAKE 1 TABLET BY MOUTH TWICE DAILY  hydrALAZINE (APRESOLINE) 100 MG tablet, Take 1 tablet by mouth 3 times daily  ammonium lactate (LAC-HYDRIN) 12 % lotion, APPLY TOPICALLY TWICE DAILY AS NEEDED  insulin glargine (LANTUS SOLOSTAR) 100 UNIT/ML injection pen, INJECT 10 UNITS SUBCUTANEOUSLY IN THE MORNING AND 30 UNITS EVERY EVENING  gabapentin (NEURONTIN) 800 MG tablet, TAKE 1 TABLET BY MOUTH FOUR TIMES DAILY  DULoxetine (CYMBALTA) 60 MG extended release capsule, TAKE TWO (2) CAPSULES BY MOUTH EVERY MORNING  vitamin D (ERGOCALCIFEROL) 1.25 MG (22879 UT) CAPS capsule, mondays  Insulin Syringe-Needle U-100 (ULTICARE INSULIN SYRINGE) 31G X 5/16\" 0.3 ML MISC, 1 each by Other route 5 times daily    CURRENT MEDS :  Scheduled Meds:   sodium chloride flush  5-40 mL IntraVENous 2 times per day    ipratropium 0.5 mg-albuterol 2.5 mg  1 Dose Inhalation Q4H WA RT    cefepime  2,000 mg IntraVENous Q12H    doxycycline (VIBRAMYCIN) IV  100 mg IntraVENous 2 times per day    insulin glargine  30 Units SubCUTAneous Nightly    insulin lispro  0-8 Units SubCUTAneous TID WC    insulin lispro  0-4 Units SubCUTAneous Nightly       Continuous Infusions:   sodium chloride      dextrose      heparin (PORCINE) Infusion 15 Units/kg/hr (07/29/24 1243)       No Known Allergies    REVIEW OF SYSTEMS:  REVIEW OF SYMPTOMS:  Review of Systems   Unable to perform ROS: Acuity of condition   Constitutional:  Positive for fever. Negative for chills and fatigue.   HENT: Negative.     Eyes: Negative.    Respiratory:  Positive for cough, chest tightness and shortness of breath.    Cardiovascular: Negative.  Negative for chest pain and leg swelling.   Gastrointestinal:  Negative for abdominal pain, nausea and vomiting.   Endocrine: Negative for cold intolerance and heat intolerance.   Genitourinary:  Negative for difficulty urinating and dysuria.   Musculoskeletal: Negative.    Skin:  Negative for color change and rash.   Allergic/Immunologic:

## 2024-07-29 NOTE — H&P
Hospitalist History & Physical      PCP: Juanito Kline DO    Date of Service: Pt seen/examined on 7/28/2024     admitted to Inpatient with expected LOS greater than two midnights due to medical therapy.      Chief Complaint:  had concerns including Cough and Shortness of Breath (tremors).    History of Present Illness:    Mr. Florentin Stark, a 68 y.o. 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[facility]), Acute kidney injury (HCC), AKA stump complication (HCC), 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 (HCC), Atherosclerosis of native artery of left leg with ulceration of ankle (HCC), 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), Cellulitis, scrotum, Coagulopathy ([facility]), Critical lower limb ischemia ([facility]), Diabetes mellitus ([facility]), Diabetic foot infection ([facility]), 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, with necrosis of bone ([facility]), 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 2 diabetes mellitus, with fat layer exposed ([facility]), Disruption of closure of muscle or muscle flap, sequela, DVT, lower extremity ([facility]), Encounter for dental examination and cleaning with abnormal findings, Gas gangrene of thigh ([facility]), History of DVT (deep vein thrombosis), Hyperglycemia due to type 2 diabetes mellitus ([facility]), Hyperlipidemia, Hypertension, Ischemic ulcer of right thigh with fat layer exposed (HCC), Ischemic ulcer of right thigh with necrosis of muscle ([facility]), 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[facility]), Moderate protein-calorie malnutrition ([facility]), Occlusion of common femoral artery ([facility]), RAGHU (obstructive sleep apnea), Osteomyelitis ([facility]), Osteomyelitis of right lower limb (HCC), Pain syndrome, chronic, Postoperative wound infection, PVD (peripheral vascular disease) ([facility]), Tobacco dependence, Vascular occlusion, Vitamin D deficiency, and Wound infection.  Patient presented to emergency    Lab Results   Component Value Date    LACTA 1.0 09/25/2023     Thyroid Studies:   Lab Results   Component Value Date    TSH 1.410 08/18/2022       Oupatient labs:  Lab Results   Component Value Date    CHOL 129 09/30/2023    TRIG 108 09/30/2023    HDL 36 (L) 09/30/2023    TSH 1.410 08/18/2022    PSA 0.64 08/18/2022    INR 1.2 05/08/2024    LABA1C 5.6 06/06/2024       Urinalysis:    Lab Results   Component Value Date/Time    NITRU NEGATIVE 10/01/2023 03:15 PM    WBCUA 0 TO 5 10/01/2023 03:15 PM    BACTERIA TRACE 10/01/2023 03:15 PM    RBCUA 10 TO 20 10/01/2023 03:15 PM    BLOODU MODERATE 01/16/2023 08:45 PM    GLUCOSEU NEGATIVE 10/01/2023 03:15 PM       Imaging:  XR CHEST PORTABLE    Result Date: 7/28/2024  Bilateral pulmonary opacities worrisome for pneumonia.         Assessment:    Principal Problem:    Pneumonia due to organism  Resolved Problems:    * No resolved hospital problems. *      Plan:  Discussed patient's case with ED physician.    Acute hypoxic respiratory failure likely secondary to community-acquired pneumonia versus pulmonary embolism versus CHF exacerbation  Reviewed imaging, chest x-ray with bilateral pulmonary opacities concerning for pneumonia  Blood cultures sent from ED and pending  Obtain sputum culture, Pneumococcal antigen, Legionella urine antigen  Check procalcitonin  Respiratory DFA panel reviewed and negative  Nasal cannula oxygen prn and BiPAP as needed  Continuous pulse oximetry monitoring  Duonebs q4 while awake  Mucinex daily   Tylenol prn for fever  Start antibiotics: IV cefepime and doxycycline  Dimer elevated, VQ scan in a.m.  proBNP elevated 1578 though this appears close to patient's baseline, chest x-ray more indicative of pneumonia, more than likely pneumonia but could be component of both.   Consider IV Bumex pending repeat proBNP  Check echocardiogram    Leukocytosis likely secondary to above  WBC currently 12.5, will continue to monitor    Elevated D-dimer secondary to  CKD versus PE  D-dimer elevated 1298, possibly elevated in the setting of chronic kidney disease however PE cannot be ruled out  Patient with CKD unable to obtain CTA will get VQ scan  Start IV heparin drip in the interim until VQ scan completed  Check echocardiogram    Acute kidney injury in the setting of chronic kidney disease 3B  Reviewed labs, creatinine 2.6 baseline seems to be 1.9-2.0  Check urine studies  Avoid nephrotoxic medications  Hold off on IV fluids for now in light of possible CHF exacerbation.   Nephrology consulted recommendations appreciated    Anemia in the setting of chronic disease  Reviewed CBC, hemoglobin currently 9.5 close to patient's baseline  Continue to monitor transfuse if less than 7    Elevated troponin likely secondary to above  155 on presentation with repeat at 146, this appears close to patient's baseline    History of left foot wound w/ infection likely related to PVD and DM.    Reviewed notes from previous hospitalization, wound culture showed multiple organisms including Proteus, gram-positive rods, diphtheroids  Follows closely with vascular surgery outpatient    Other chronic comorbidities: Hyperlipidemia, Hypertension, Obesity, DM2, H/O AKA, History of osteomyelitis left distal fibular shaft.  Reviewed patient's blood glucose level, currently well-controlled we will continue home dose Lantus of 30 units nightly and start sliding scale insulin and hypoglycemia protocol  Patient follows closely with vascular surgery outpatient for known history of wounds  Continue rest of home medications as appropriate  Continue to follow labs replete as indicated    Diet: Diet NPO  Code Status: Full Code  Surrogate decision maker confirmed with patient:   Extended Emergency Contact Information  Primary Emergency Contact: JEFRY MARTINI  Home Phone: [phone]  Relation: Brother/Sister    DVT Prophylaxis: []Lovenox [x]Heparin []PCD [] Warfarin/NOAC []Encouraged ambulation  Disposition:

## 2024-07-29 NOTE — CARE COORDINATION
7/29:  Transition of care:  Pt presented to the ER for SOB from home.  Pt is on continuously BIPAP, Labetalol PRN, Iv Apresoline PRN, Iv Heparin Gtt, Iv Doxycycline til 8/4, & Iv Maxipime til 8/4.  Cm spoke with pt  to discuss CM role & dc planning.  Pt's PCP is Dr Kline & uses [facility] on [address].  Pt lives alone in an apartment -entrance level with a ramp to enter.  PTA pt uses a power chair.  Pt has 02/4L/NC continuously via [facility].  Pt has a nebulizer & is a diabetic with a meter & treats with SQ/PO medications.  Per his sister he has all of his supplies.  Pt was active with [facility].  Will need a resumption of care order on dc.  Per pt's sister his plan will be to return home & family can transport.  Sw/CM will continue to follow & check tomorrow for clarity.  Electronically signed by Cristine Zambrano RN on 7/29/2024 at 2:32 PM    Case Management Assessment  Initial Evaluation    Date/Time of Evaluation: 7/29/2024 2:34 PM  Assessment Completed by: Cristine Zambrano RN    If patient is discharged prior to next notation, then this note serves as note for discharge by case management.    Patient Name: Florentin Stark                   YOB: 1955  Diagnosis: Shortness of breath [R06.02]  Wheezing [R06.2]  Pneumonia due to organism [J18.9]  Elevated troponin [R79.89]  Elevated d-dimer [R79.89]  Acute on chronic respiratory failure with hypoxia (HCC) [J96.21]  Acute kidney injury superimposed on chronic kidney disease (HCC) [N17.9, N18.9]  Pneumonia due to infectious organism, unspecified laterality, unspecified part of lung [J18.9]  Congestive heart failure, unspecified HF chronicity, unspecified heart failure type (HCC) [I50.9]                   Date / Time: 7/28/2024  2:02 PM    Patient Admission Status: Inpatient   Readmission Risk (Low < 19, Mod (19-27), High > 27): Readmission Risk Score: 20.9    Current PCP: Juanito Kline, DO  PCP verified by CM? Yes    Chart Reviewed: Yes      History  Provided by: Patient  Patient Orientation: Alert and Oriented, Person, Situation, Place, Self    Patient Cognition: Alert    Hospitalization in the last 30 days (Readmission):  No    If yes, Readmission Assessment in CM Navigator will be completed.    Advance Directives:      Code Status: Full Code   Patient's Primary Decision Maker is: Legal Next of Kin    Primary Decision Maker: JEFRY MARTINI - Brother/Sister - [phone]    Discharge Planning:    Patient lives with: Alone Type of Home: House  Primary Care Giver: Self  Patient Support Systems include: Family Members   Current Financial resources:    Current community resources:    Current services prior to admission: C-pap            Current DME:              Type of Home Care services:  Nursing Services    ADLS  Prior functional level: Independent in ADLs/IADLs  Current functional level: Independent in ADLs/IADLs    PT AM-PAC:   /24  OT AM-PAC:   /24    Family can provide assistance at DC: No  Would you like Case Management to discuss the discharge plan with any other family members/significant others, and if so, who? No  Plans to Return to Present Housing: Unknown at present  Other Identified Issues/Barriers to RETURNING to current housing:   Potential Assistance needed at discharge: Home Care, Skilled Nursing Facility            Potential DME:    Patient expects to discharge to: Skilled nursing facility  Plan for transportation at discharge:      Financial    Payor: [facility] MEDICARE / Plan: [employer] DUAL COMPLETE / Product Type: *No Product type* /     Does insurance require precert for SNF: Yes    Potential assistance Purchasing Medications:    Meds-to-Beds request: Yes      [facility] [id number] [facility] [phone] - F [phone]  [address] [id number]  Phone: [phone] Fax: [phone]    [id number] [address] - P [phone] - F [phone]  [facility] [address]  Phone: [phone] Fax: [phone]      Notes:    Factors facilitating achievement of predicted outcomes: Pleasant    Barriers to discharge: Cognitive deficit    Additional Case Management Notes:     The Plan for Transition of Care is related to the following treatment goals of Shortness of breath [R06.02]  Wheezing [R06.2]  Pneumonia due to organism [J18.9]  Elevated troponin [R79.89]  Elevated d-dimer [R79.89]  Acute on chronic respiratory failure with hypoxia (HCC) [J96.21]  Acute kidney injury superimposed on chronic kidney disease (HCC) [N17.9, N18.9]  Pneumonia due to infectious organism, unspecified laterality, unspecified part of lung [J18.9]  Congestive heart failure, unspecified HF chronicity, unspecified heart failure type (HCC) [I50.9]    IF APPLICABLE: The Patient and/or patient representative Florentin and his family were provided with a choice of provider and agrees with the discharge plan. Freedom of choice list with basic dialogue that supports the patient's individualized plan of care/goals and shares the quality data associated with the providers was provided to:     Patient Representative Name:       The Patient and/or Patient Representative Agree with the Discharge Plan?      Cristine Zambrano RN  Case Management Department  Ph:[phone]

## 2024-07-29 NOTE — CONSULTS
Department of Internal Medicine  Nephrology Attending Consult Note      Reason for Consult:  COLE  Requesting Physician: Ron Cohen, SHONDA - CNP     CHIEF COMPLAINT:  SOB/chest pain    History Obtained From:  patient, electronic medical record    HISTORY OF PRESENT ILLNESS:  Briefly, Mr. Florentin Stark is 67 year old male with a past medical history of CKD 3b with large proteinuria 2/2 diabetic nephropathy, baseline creatinine 1.9-2 mg/dL, HTN, type II DM, DVT, total right leg amputation, Diabetic ulcer of the L calf s/p debridement, HLD, RAGHU, PVD, Vitamin D deficiency, who was admitted on 7/29/2024 after he presented to the ER with complaints of shortness of breath and cough, chest x-ray showed bilateral pulm opacities worrisome for pneumonia, proBNP was 2074.  On admission his creatinine level was 2.6 mg/dL, reason for this consultation.  His medications prior to admission included losartan 25 mg daily, Bumex 1 mg daily, chlorthalidone 25 mg daily.    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[facility]) 01/16/2020    Acute kidney injury ([facility]) 05/18/2020    AKA stump complication ([facility]) 05/12/2021    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 ([facility]) 05/22/2018    Atherosclerosis of native artery of left leg with ulceration of ankle ([facility]) 09/29/2023    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 ([facility]) 05/21/2018    Cellulitis, scrotum 03/20/2020    Coagulopathy ([facility]) 05/21/2018    Critical lower limb ischemia ([facility]) 03/20/2020    Diabetes mellitus ([facility])     Diabetic foot infection ([facility]) 09/26/2023    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

## 2024-07-29 NOTE — PROGRESS NOTES
Date: 7/28/2024    Time: 11:22 PM    Patient Placed On BIPAP/CPAP/ Non-Invasive Ventilation?  Yes    If no must comment.  Facial area red/color change? No           If YES are Blister/Lesion present?No   If yes must notify nursing staff  BIPAP/CPAP skin barrier?  Yes    Skin barrier type:mepilexlite       Comments:        Patricia Simerlink, RCP

## 2024-07-29 NOTE — HOME CARE
Patient current with [facility] for SN. Will need KAROLINA orders if appropriate at discharge.  Vy Villafana LPN  [facility].

## 2024-07-29 NOTE — PLAN OF CARE
Problem: Chronic Conditions and Co-morbidities  Goal: Patient's chronic conditions and co-morbidity symptoms are monitored and maintained or improved  7/29/2024 1650 by Lindsay Mcmahon RN  Outcome: Progressing  7/29/2024 0711 by Patrick Cavazos RN  Outcome: Progressing     Problem: Discharge Planning  Goal: Discharge to home or other facility with appropriate resources  7/29/2024 1650 by Lindsay Mcmahon RN  Outcome: Progressing  7/29/2024 0711 by Patrick Cavazos RN  Outcome: Progressing  Flowsheets (Taken 7/29/2024 0653 by Mike Hernandez RN)  Discharge to home or other facility with appropriate resources:   Identify barriers to discharge with patient and caregiver   Identify discharge learning needs (meds, wound care, etc)     Problem: Safety - Adult  Goal: Free from fall injury  7/29/2024 1650 by Lindsay Mcmahon RN  Outcome: Progressing  7/29/2024 0711 by Patrick Cavazos RN  Outcome: Progressing  Flowsheets (Taken 7/29/2024 0650 by Mike Hernandez RN)  Free From Fall Injury: Instruct family/caregiver on patient safety     Problem: ABCDS Injury Assessment  Goal: Absence of physical injury  7/29/2024 1650 by Lindsay Mcmahon RN  Outcome: Progressing  7/29/2024 0711 by Patrick Cavazos RN  Outcome: Progressing     Problem: Skin/Tissue Integrity  Goal: Absence of new skin breakdown  Description: 1.  Monitor for areas of redness and/or skin breakdown  2.  Assess vascular access sites hourly  3.  Every 4-6 hours minimum:  Change oxygen saturation probe site  4.  Every 4-6 hours:  If on nasal continuous positive airway pressure, respiratory therapy assess nares and determine need for appliance change or resting period.  7/29/2024 1650 by Lindsay Mcmahon RN  Outcome: Progressing  7/29/2024 0711 by Patrick Cavazos RN  Outcome: Progressing

## 2024-07-30 ENCOUNTER — APPOINTMENT (OUTPATIENT)
Dept: CT IMAGING | Age: 69
End: 2024-07-30
Payer: MEDICARE

## 2024-07-30 ENCOUNTER — APPOINTMENT (OUTPATIENT)
Age: 69
End: 2024-07-30
Payer: MEDICARE

## 2024-07-30 ENCOUNTER — APPOINTMENT (OUTPATIENT)
Dept: GENERAL RADIOLOGY | Age: 69
End: 2024-07-30
Payer: MEDICARE

## 2024-07-30 LAB
AADO2: 256.5 MMHG
ANION GAP SERPL CALCULATED.3IONS-SCNC: 9 MMOL/L (ref 7–16)
B.E.: 5.8 MMOL/L (ref -3–3)
BUN SERPL-MCNC: 53 MG/DL (ref 6–23)
CALCIUM SERPL-MCNC: 9.3 MG/DL (ref 8.6–10.2)
CHLORIDE SERPL-SCNC: 100 MMOL/L (ref 98–107)
CO2 SERPL-SCNC: 33 MMOL/L (ref 22–29)
COHB: 0.8 % (ref 0–1.5)
CREAT SERPL-MCNC: 2.1 MG/DL (ref 0.7–1.2)
CRITICAL: ABNORMAL
DATE ANALYZED: ABNORMAL
DATE OF COLLECTION: ABNORMAL
ECHO AO ASC DIAM: 3.4 CM
ECHO AO ASCENDING AORTA INDEX: 1.32 CM/M2
ECHO AV AREA PEAK VELOCITY: 2.9 CM2
ECHO AV AREA VTI: 2.8 CM2
ECHO AV AREA/BSA PEAK VELOCITY: 1.1 CM2/M2
ECHO AV AREA/BSA VTI: 1.1 CM2/M2
ECHO AV CUSP MM: 1.5 CM
ECHO AV MEAN GRADIENT: 8 MMHG
ECHO AV MEAN VELOCITY: 1.3 M/S
ECHO AV PEAK GRADIENT: 16 MMHG
ECHO AV PEAK VELOCITY: 2 M/S
ECHO AV VELOCITY RATIO: 0.75
ECHO AV VTI: 40.7 CM
ECHO EST RA PRESSURE: 3 MMHG
ECHO LA DIAMETER INDEX: 1.59 CM/M2
ECHO LA DIAMETER: 4.1 CM
ECHO LA VOL A-L A2C: 72 ML (ref 18–58)
ECHO LA VOL A-L A4C: 64 ML (ref 18–58)
ECHO LA VOL MOD A2C: 69 ML (ref 18–58)
ECHO LA VOL MOD A4C: 61 ML (ref 18–58)
ECHO LA VOLUME AREA LENGTH: 71 ML
ECHO LA VOLUME INDEX A-L A2C: 28 ML/M2 (ref 16–34)
ECHO LA VOLUME INDEX A-L A4C: 25 ML/M2 (ref 16–34)
ECHO LA VOLUME INDEX AREA LENGTH: 28 ML/M2 (ref 16–34)
ECHO LA VOLUME INDEX MOD A2C: 27 ML/M2 (ref 16–34)
ECHO LA VOLUME INDEX MOD A4C: 24 ML/M2 (ref 16–34)
ECHO LV EDV A2C: 88 ML
ECHO LV EDV A4C: 193 ML
ECHO LV EDV BP: 139 ML (ref 67–155)
ECHO LV EDV INDEX A4C: 75 ML/M2
ECHO LV EDV INDEX BP: 54 ML/M2
ECHO LV EDV NDEX A2C: 34 ML/M2
ECHO LV EJECTION FRACTION A2C: 60 %
ECHO LV EJECTION FRACTION A4C: 57 %
ECHO LV EJECTION FRACTION BIPLANE: 58 % (ref 55–100)
ECHO LV ESV A2C: 35 ML
ECHO LV ESV A4C: 84 ML
ECHO LV ESV BP: 58 ML (ref 22–58)
ECHO LV ESV INDEX A2C: 14 ML/M2
ECHO LV ESV INDEX A4C: 33 ML/M2
ECHO LV ESV INDEX BP: 22 ML/M2
ECHO LV FRACTIONAL SHORTENING: 29 % (ref 28–44)
ECHO LV INTERNAL DIMENSION DIASTOLE INDEX: 2.17 CM/M2
ECHO LV INTERNAL DIMENSION DIASTOLIC: 5.6 CM (ref 4.2–5.9)
ECHO LV INTERNAL DIMENSION SYSTOLIC INDEX: 1.55 CM/M2
ECHO LV INTERNAL DIMENSION SYSTOLIC: 4 CM
ECHO LV ISOVOLUMETRIC RELAXATION TIME (IVRT): 76.1 MS
ECHO LV IVSD: 1.4 CM (ref 0.6–1)
ECHO LV IVSS: 1.7 CM
ECHO LV MASS 2D: 347.6 G (ref 88–224)
ECHO LV MASS INDEX 2D: 134.7 G/M2 (ref 49–115)
ECHO LV POSTERIOR WALL DIASTOLIC: 1.4 CM (ref 0.6–1)
ECHO LV POSTERIOR WALL SYSTOLIC: 1.8 CM
ECHO LV RELATIVE WALL THICKNESS RATIO: 0.5
ECHO LVOT AREA: 3.8 CM2
ECHO LVOT AV VTI INDEX: 0.72
ECHO LVOT DIAM: 2.2 CM
ECHO LVOT MEAN GRADIENT: 4 MMHG
ECHO LVOT PEAK GRADIENT: 9 MMHG
ECHO LVOT PEAK VELOCITY: 1.5 M/S
ECHO LVOT STROKE VOLUME INDEX: 43.1 ML/M2
ECHO LVOT SV: 111.3 ML
ECHO LVOT VTI: 29.3 CM
ECHO MV "A" WAVE DURATION: 135 MSEC
ECHO MV A VELOCITY: 0.76 M/S
ECHO MV AREA PHT: 3 CM2
ECHO MV AREA VTI: 4.5 CM2
ECHO MV E DECELERATION TIME (DT): 174.8 MS
ECHO MV E VELOCITY: 0.8 M/S
ECHO MV E/A RATIO: 1.05
ECHO MV LVOT VTI INDEX: 0.84
ECHO MV MAX VELOCITY: 0.9 M/S
ECHO MV MEAN GRADIENT: 1 MMHG
ECHO MV MEAN VELOCITY: 0.6 M/S
ECHO MV PEAK GRADIENT: 3 MMHG
ECHO MV PRESSURE HALF TIME (PHT): 73.4 MS
ECHO MV VTI: 24.7 CM
ECHO PULMONARY ARTERY SYSTOLIC PRESSURE (PASP): 26 MMHG
ECHO PV MAX VELOCITY: 1.2 M/S
ECHO PV MEAN GRADIENT: 3 MMHG
ECHO PV MEAN VELOCITY: 0.8 M/S
ECHO PV PEAK GRADIENT: 6 MMHG
ECHO PV VTI: 19.5 CM
ECHO PVEIN A DURATION: 79.6 MS
ECHO PVEIN A VELOCITY: 0.3 M/S
ECHO PVEIN PEAK D VELOCITY: 0.5 M/S
ECHO PVEIN PEAK S VELOCITY: 0.6 M/S
ECHO PVEIN S/D RATIO: 1.2
ECHO RIGHT VENTRICULAR SYSTOLIC PRESSURE (RVSP): 26 MMHG
ECHO RV INTERNAL DIMENSION: 4 CM
ECHO TV REGURGITANT MAX VELOCITY: 2.42 M/S
ECHO TV REGURGITANT PEAK GRADIENT: 23 MMHG
EKG ATRIAL RATE: 113 BPM
EKG Q-T INTERVAL: 400 MS
EKG QRS DURATION: 96 MS
EKG QTC CALCULATION (BAZETT): 476 MS
EKG R AXIS: -7 DEGREES
EKG T AXIS: 84 DEGREES
EKG VENTRICULAR RATE: 85 BPM
ERYTHROCYTE [DISTWIDTH] IN BLOOD BY AUTOMATED COUNT: 16.4 % (ref 11.5–15)
FERRITIN SERPL-MCNC: 346 NG/ML
FIO2: 60 %
FOLATE SERPL-MCNC: 5.6 NG/ML (ref 4.8–24.2)
GFR, ESTIMATED: 35 ML/MIN/1.73M2
GLUCOSE BLD-MCNC: 136 MG/DL (ref 74–99)
GLUCOSE BLD-MCNC: 140 MG/DL (ref 74–99)
GLUCOSE BLD-MCNC: 142 MG/DL (ref 74–99)
GLUCOSE BLD-MCNC: 173 MG/DL (ref 74–99)
GLUCOSE SERPL-MCNC: 136 MG/DL (ref 74–99)
HCO3: 31.8 MMOL/L (ref 22–26)
HCT VFR BLD AUTO: 33 % (ref 37–54)
HGB BLD-MCNC: 10.1 G/DL (ref 12.5–16.5)
HHB: 2 % (ref 0–5)
IRON SATN MFR SERPL: 18 % (ref 20–55)
IRON SERPL-MCNC: 29 UG/DL (ref 59–158)
LAB: ABNORMAL
LEFT VENTRICULAR EJECTION FRACTION HIGH VALUE: 55 %
LEFT VENTRICULAR EJECTION FRACTION MODE: NORMAL
LV EF: 50 %
Lab: 1210
MCH RBC QN AUTO: 24.6 PG (ref 26–35)
MCHC RBC AUTO-ENTMCNC: 30.6 G/DL (ref 32–34.5)
MCV RBC AUTO: 80.3 FL (ref 80–99.9)
METHB: 0.5 % (ref 0–1.5)
MODE: ABNORMAL
O2 SATURATION: 98 % (ref 92–98.5)
O2HB: 96.7 % (ref 94–97)
OPERATOR ID: 7292
PARTIAL THROMBOPLASTIN TIME: 33.5 SEC (ref 24.5–35.1)
PATIENT TEMP: 37 C
PCO2: 52.5 MMHG (ref 35–45)
PEEP/CPAP: 10 CMH2O
PFO2: 1.89 MMHG/%
PH BLOOD GAS: 7.4 (ref 7.35–7.45)
PIP: 14 CMH2O
PLATELET # BLD AUTO: 243 K/UL (ref 130–450)
PMV BLD AUTO: 11.3 FL (ref 7–12)
PO2: 113.6 MMHG (ref 75–100)
POTASSIUM SERPL-SCNC: 3.9 MMOL/L (ref 3.5–5)
PROCALCITONIN SERPL-MCNC: 0.32 NG/ML (ref 0–0.08)
RBC # BLD AUTO: 4.11 M/UL (ref 3.8–5.8)
RI(T): 2.26
SODIUM SERPL-SCNC: 142 MMOL/L (ref 132–146)
SOURCE, BLOOD GAS: ABNORMAL
THB: 11.8 G/DL (ref 11.5–16.5)
TIBC SERPL-MCNC: 164 UG/DL (ref 250–450)
TIME ANALYZED: 1214
VIT B12 SERPL-MCNC: 572 PG/ML (ref 211–946)
WBC OTHER # BLD: 13.5 K/UL (ref 4.5–11.5)

## 2024-07-30 PROCEDURE — 2580000003 HC RX 258: Performed by: INTERNAL MEDICINE

## 2024-07-30 PROCEDURE — 2140000000 HC CCU INTERMEDIATE R&B

## 2024-07-30 PROCEDURE — 84145 PROCALCITONIN (PCT): CPT

## 2024-07-30 PROCEDURE — 93306 TTE W/DOPPLER COMPLETE: CPT

## 2024-07-30 PROCEDURE — 71250 CT THORAX DX C-: CPT

## 2024-07-30 PROCEDURE — 94640 AIRWAY INHALATION TREATMENT: CPT

## 2024-07-30 PROCEDURE — 99232 SBSQ HOSP IP/OBS MODERATE 35: CPT | Performed by: INTERNAL MEDICINE

## 2024-07-30 PROCEDURE — 2580000003 HC RX 258

## 2024-07-30 PROCEDURE — 82962 GLUCOSE BLOOD TEST: CPT

## 2024-07-30 PROCEDURE — 93306 TTE W/DOPPLER COMPLETE: CPT | Performed by: INTERNAL MEDICINE

## 2024-07-30 PROCEDURE — 6360000002 HC RX W HCPCS

## 2024-07-30 PROCEDURE — 82746 ASSAY OF FOLIC ACID SERUM: CPT

## 2024-07-30 PROCEDURE — 85027 COMPLETE CBC AUTOMATED: CPT

## 2024-07-30 PROCEDURE — 6370000000 HC RX 637 (ALT 250 FOR IP)

## 2024-07-30 PROCEDURE — 82805 BLOOD GASES W/O2 SATURATION: CPT

## 2024-07-30 PROCEDURE — 36600 WITHDRAWAL OF ARTERIAL BLOOD: CPT

## 2024-07-30 PROCEDURE — 83986 ASSAY PH BODY FLUID NOS: CPT

## 2024-07-30 PROCEDURE — 36415 COLL VENOUS BLD VENIPUNCTURE: CPT

## 2024-07-30 PROCEDURE — 83540 ASSAY OF IRON: CPT

## 2024-07-30 PROCEDURE — 87205 SMEAR GRAM STAIN: CPT

## 2024-07-30 PROCEDURE — 6360000002 HC RX W HCPCS: Performed by: INTERNAL MEDICINE

## 2024-07-30 PROCEDURE — 85730 THROMBOPLASTIN TIME PARTIAL: CPT

## 2024-07-30 PROCEDURE — 83550 IRON BINDING TEST: CPT

## 2024-07-30 PROCEDURE — 71045 X-RAY EXAM CHEST 1 VIEW: CPT

## 2024-07-30 PROCEDURE — 94660 CPAP INITIATION&MGMT: CPT

## 2024-07-30 PROCEDURE — 82607 VITAMIN B-12: CPT

## 2024-07-30 PROCEDURE — 80048 BASIC METABOLIC PNL TOTAL CA: CPT

## 2024-07-30 PROCEDURE — 84157 ASSAY OF PROTEIN OTHER: CPT

## 2024-07-30 PROCEDURE — 6370000000 HC RX 637 (ALT 250 FOR IP): Performed by: INTERNAL MEDICINE

## 2024-07-30 PROCEDURE — 87070 CULTURE OTHR SPECIMN AEROBIC: CPT

## 2024-07-30 PROCEDURE — 2700000000 HC OXYGEN THERAPY PER DAY

## 2024-07-30 PROCEDURE — 2500000003 HC RX 250 WO HCPCS

## 2024-07-30 PROCEDURE — 93010 ELECTROCARDIOGRAM REPORT: CPT | Performed by: INTERNAL MEDICINE

## 2024-07-30 PROCEDURE — 89051 BODY FLUID CELL COUNT: CPT

## 2024-07-30 PROCEDURE — 83615 LACTATE (LD) (LDH) ENZYME: CPT

## 2024-07-30 PROCEDURE — 82728 ASSAY OF FERRITIN: CPT

## 2024-07-30 PROCEDURE — 82945 GLUCOSE OTHER FLUID: CPT

## 2024-07-30 RX ORDER — HEPARIN SODIUM 5000 [USP'U]/ML
5000 INJECTION, SOLUTION INTRAVENOUS; SUBCUTANEOUS EVERY 8 HOURS
Status: DISCONTINUED | OUTPATIENT
Start: 2024-07-30 | End: 2024-08-02 | Stop reason: HOSPADM

## 2024-07-30 RX ORDER — AMLODIPINE BESYLATE 10 MG/1
10 TABLET ORAL DAILY
Status: DISCONTINUED | OUTPATIENT
Start: 2024-07-30 | End: 2024-08-02 | Stop reason: HOSPADM

## 2024-07-30 RX ADMIN — GUAIFENESIN 400 MG: 400 TABLET ORAL at 16:08

## 2024-07-30 RX ADMIN — GUAIFENESIN 400 MG: 400 TABLET ORAL at 09:54

## 2024-07-30 RX ADMIN — HYDRALAZINE HYDROCHLORIDE 10 MG: 20 INJECTION INTRAMUSCULAR; INTRAVENOUS at 01:20

## 2024-07-30 RX ADMIN — IPRATROPIUM BROMIDE AND ALBUTEROL SULFATE 1 DOSE: 2.5; .5 SOLUTION RESPIRATORY (INHALATION) at 12:49

## 2024-07-30 RX ADMIN — LABETALOL HYDROCHLORIDE 10 MG: 5 INJECTION INTRAVENOUS at 16:18

## 2024-07-30 RX ADMIN — IPRATROPIUM BROMIDE AND ALBUTEROL SULFATE 1 DOSE: 2.5; .5 SOLUTION RESPIRATORY (INHALATION) at 19:19

## 2024-07-30 RX ADMIN — CEFEPIME 2000 MG: 2 INJECTION, POWDER, FOR SOLUTION INTRAVENOUS at 06:13

## 2024-07-30 RX ADMIN — AMLODIPINE BESYLATE 10 MG: 10 TABLET ORAL at 12:02

## 2024-07-30 RX ADMIN — GUAIFENESIN 400 MG: 400 TABLET ORAL at 20:47

## 2024-07-30 RX ADMIN — IPRATROPIUM BROMIDE AND ALBUTEROL SULFATE 1 DOSE: 2.5; .5 SOLUTION RESPIRATORY (INHALATION) at 08:51

## 2024-07-30 RX ADMIN — SODIUM CHLORIDE, PRESERVATIVE FREE 10 ML: 5 INJECTION INTRAVENOUS at 22:31

## 2024-07-30 RX ADMIN — IPRATROPIUM BROMIDE AND ALBUTEROL SULFATE 1 DOSE: 2.5; .5 SOLUTION RESPIRATORY (INHALATION) at 15:49

## 2024-07-30 RX ADMIN — HEPARIN SODIUM 15 UNITS/KG/HR: 10000 INJECTION, SOLUTION INTRAVENOUS at 01:22

## 2024-07-30 RX ADMIN — SODIUM CHLORIDE 125 MG: 9 INJECTION, SOLUTION INTRAVENOUS at 16:24

## 2024-07-30 RX ADMIN — DOXYCYCLINE 100 MG: 100 INJECTION, POWDER, LYOPHILIZED, FOR SOLUTION INTRAVENOUS at 12:15

## 2024-07-30 RX ADMIN — BUMETANIDE 0.5 MG/HR: 0.25 INJECTION INTRAMUSCULAR; INTRAVENOUS at 19:01

## 2024-07-30 ASSESSMENT — PAIN SCALES - GENERAL
PAINLEVEL_OUTOF10: 0

## 2024-07-30 NOTE — PROGRESS NOTES
Trialed patient off bipap at this time, SpO2 remained at 96% but patient stating he was having a hard time breathing, placed back on bipap 14/10 60%. Patient had bilateral wheezes on auscultation breathing tx given at this time.

## 2024-07-30 NOTE — PROGRESS NOTES
Venkata Espinoza M.D.,[facility]  Jesse Jarrell D.O., F.FLORIDALMA.PETEYORUBÉN., [facility]  Miles Camarillo M.D.  Nancy Hogan M.D.   Phil Erwin D.O.  Agustín Evans M.D.         Daily Pulmonary Progress Note    Patient:  Florentin Stark 68 y.o. male MRN: [id number]            Synopsis     We are following patient for pneumonia    \"CC\" cough, SOB    Code status: FULL CODE      Subjective      Patient was seen and examined lying in bed on BiPap getting echocardiogram. patient much more awake and alert today. ABGs today show compensation. Strep and legionella antigens are negative, procalcitonin 0.32. Chest CT completed showing pulmonary edema with bilateral effusions R>L.       Review of Systems:  Constitutional: Denies fever, weight loss, night sweats, and fatigue  Skin: Denies pigmentation, dark lesions, and rashes   HEENT: Denies hearing loss, tinnitus, ear drainage, epistaxis, sore throat, and hoarseness.  Cardiovascular: Denies palpitations, chest pain, and chest pressure.  Respiratory: cough, chest tightness, shortness of breath  Gastrointestinal: Denies nausea, vomiting, poor appetite, diarrhea, heartburn or reflux  Genitourinary: Denies dysuria, frequency, urgency or hematuria  Musculoskeletal: Denies myalgias, muscle weakness, and bone pain  Neurological: Denies dizziness, vertigo, headache, and focal weakness  Psychological: Denies anxiety and depression  Endocrine: Denies heat intolerance and cold intolerance  Hematopoietic/Lymphatic: Denies bleeding problems and blood transfusions    24-hour events:  No new events    Objective   OBJECTIVE:   BP (!) 213/87   Pulse 89   Temp 97.9 °F (36.6 °C) (Oral)   Resp 18   Ht 1.88 m (6' 2\")   Wt (!) 136.3 kg (300 lb 9.5 oz)   SpO2 95%   BMI 38.59 kg/m²   SpO2 Readings from Last 1 Encounters:   07/30/24 95%        I/O:    Intake/Output Summary (Last 24 hours) at 7/30/2024 1336  Last data filed at 7/30/2024 1257  Gross per 24 hour   Intake 480 ml   Output 5700 ml   Net  -5220 ml             IPAP: 14 cmH20  CPAP/EPAP: 10 cmH2O     CURRENT MEDS :  Scheduled Meds:   amLODIPine  10 mg Oral Daily    ferric gluconate (FERRLECIT) 125 mg in sodium chloride 0.9 % 100 mL IVPB  125 mg IntraVENous Daily    heparin (porcine)  5,000 Units SubCUTAneous Q8H    guaiFENesin  400 mg Oral TID    sodium chloride flush  5-40 mL IntraVENous 2 times per day    ipratropium 0.5 mg-albuterol 2.5 mg  1 Dose Inhalation Q4H WA RT    cefepime  2,000 mg IntraVENous Q12H    doxycycline (VIBRAMYCIN) IV  100 mg IntraVENous 2 times per day    insulin glargine  30 Units SubCUTAneous Nightly    insulin lispro  0-8 Units SubCUTAneous TID WC    insulin lispro  0-4 Units SubCUTAneous Nightly       Physical Exam:  General Appearance: appears comfortable in no acute distress. obese  HEENT: Normocephalic atraumatic without obvious abnormality   Neck: Lips, mucosa, and tongue normal.  Supple, symmetrical, trachea midline, no adenopathy;thyroid:  no enlargement/tenderness/nodules or JVD.  Lung: Breath sounds crackles. Respirations   unlabored. Symmetrical expansion.  Heart: RRR, normal S1, S2. No MRG  Abdomen: Soft, NT, ND. BS present x 4 quadrants. No bruit or organomegaly.   Extremities: lower extremity edema, R AKA  Musculokeletal: No joint swelling, no muscle tenderness. ROM normal in all joints of extremities.   Neurologic: Mental status: Alert and responsive.    Pertinent/ New Labs and Imaging Studies     Imaging personally reviewed:  Chest CT 7/30/24:  Report pending        Echo 7/30/24:  Report pending    Labs:  Lab Results   Component Value Date/Time    WBC 13.5 07/30/2024 04:54 AM    RBC 4.11 07/30/2024 04:54 AM    HGB 10.1 07/30/2024 04:54 AM    HCT 33.0 07/30/2024 04:54 AM    MCV 80.3 07/30/2024 04:54 AM    MCH 24.6 07/30/2024 04:54 AM    MCHC 30.6 07/30/2024 04:54 AM    RDW 16.4 07/30/2024 04:54 AM     07/30/2024 04:54 AM    MPV 11.3 07/30/2024 04:54 AM     Lab Results   Component Value Date/Time

## 2024-07-30 NOTE — ACP (ADVANCE CARE PLANNING)
Advance Care Planning   Healthcare Decision Maker:    Primary Decision Maker: JEFRY MARTINI - Brother/Sister - [phone]    Click here to complete Healthcare Decision Makers including selection of the Healthcare Decision Maker Relationship (ie \"Primary\").  Today we documented Decision Maker(s) consistent with Legal Next of Kin hierarchy.     Electronically signed by Cristine Zambrano RN on 7/30/2024 at 2:45 PM

## 2024-07-30 NOTE — DISCHARGE INSTRUCTIONS
Written       Visit Discharge/Physician Orders     Discharge condition: Stable     Assessment of pain at discharge: yes     Anesthetic used: 4% lidocaine      Discharge to: Home     Left via:Private automobile     Accompanied by: family     ECF/HHA: [facility]      Dressing Orders: LEFT LATERAL ANKLE: Cleanse wound with normal saline. COVER WITH DRAWTEX. APPLY Profore Change M-W (in [facility])-F.       IF WRAP FALLS 2 INCHES, OR IF PAIN INCREASES AND BECOMES INTOLERABLE,  REMOVE WRAP AND APPLY DOUBLE TUBIGRIP.  CALL WOUND CARE CENTER.       Treatment Orders: Eat a diet high in protein and vitamin C. Take a multiple vitamin daily unless contraindicated.      Elevate leg as much as possible.     Follow up with Dr. Brooks for foot care     STOP SMOKING!      [facility] followup visit : 1 week _______________________  (Please note your next appointment above and if you are unable to keep, kindly give a 24 hour notice. Thank you.)     Physician signature:__________________________      If you experience any of the following, please call the Wound Care Center during business hours:     * Increase in Pain  * Temperature over 101  * Increase in drainage from your wound  * Drainage with a foul odor  * Bleeding  * Increase in swelling  * Need for compression bandage changes due to slippage, breakthrough drainage.     If you need medical attention outside of the business hours of the Wound Care Centers please contact your PCP or go to the nearest emergency room.

## 2024-07-30 NOTE — PROGRESS NOTES
[facility] Hospitalist Progress Note      Synopsis: Patient with a history of diabetes, PAD, AKA, chronic hypoxic respiratory failure on 4 L at baseline, admitted on 7/28/2024 after presenting to the ED with shortness of breath and increased oxygen requirements.  Patient has a history of left foot wound with infection which he follows with vascular and podiatry for as well as a right AKA.  Patient found to have community-acquired pneumonia as well as COLE on CKD.  Started on antibiotics, continues with oxygen support.  Echo ordered to evaluate for heart failure.    Subjective  Continues to be short of breath however reports improvement  More shaky and anxious today    Exam:  BP (!) 151/44   Pulse 98   Temp 98.3 °F (36.8 °C) (Axillary)   Resp 16   Ht 1.88 m (6' 2\")   Wt (!) 136.3 kg (300 lb 9.5 oz)   SpO2 95%   BMI 38.59 kg/m²   General appearance: No apparent distress, appears stated age and cooperative.  HEENT: Conjunctivae/corneas clear. Mucous membranes moist.  Neck: Supple. No JVD.  Respiratory: Rhonchi in upper airway and crackles in bases.  Normal respiratory effort.   Cardiovascular:  RRR. S1, S2 without MRG.  PV: Pulses palpable. No edema.   Abdomen: Soft, non-tender, non-distended. +BS  Musculoskeletal: Right AKA otherwise no other obvious deformities.   Skin: Vascular discoloration to left lower extremity, left foot wound with dressing dry and intact  Neurologic:  Grossly non-focal. Awake, alert, following commands.   Psychiatric: Alert and oriented, thought content appropriate, normal insight and judgement    Medications:  Reviewed    Infusion Medications    bumetanide (BUMEX) 12.5 mg in sodium chloride 0.9 % 125 mL infusion 0.5 mg/hr (07/29/24 2030)    sodium chloride      dextrose      heparin (PORCINE) Infusion 15 Units/kg/hr (07/30/24 0122)     Scheduled Medications    amLODIPine  10 mg Oral Daily    ferric gluconate (FERRLECIT) 125 mg in sodium chloride 0.9 % 100 mL IVPB  125 mg IntraVENous

## 2024-07-30 NOTE — PROGRESS NOTES
Department of Internal Medicine  Nephrology Attending Consult Note    Events reviewed.    SUBJECTIVE: We are following Florentin Stark for COLE. Patient reports increased shortness of breath.         Vitals:    VITALS:  BP (!) 151/44   Pulse 98   Temp 98.3 °F (36.8 °C) (Axillary)   Resp 16   Ht 1.88 m (6' 2\")   Wt (!) 136.3 kg (300 lb 9.5 oz)   SpO2 95%   BMI 38.59 kg/m²   24HR INTAKE/OUTPUT:    Intake/Output Summary (Last 24 hours) at 7/30/2024 1059  Last data filed at 7/30/2024 0956  Gross per 24 hour   Intake 480 ml   Output 4450 ml   Net -3970 ml     Scheduled Meds:   amLODIPine  10 mg Oral Daily    ferric gluconate (FERRLECIT) 125 mg in sodium chloride 0.9 % 100 mL IVPB  125 mg IntraVENous Daily    heparin (porcine)  5,000 Units SubCUTAneous Q8H    guaiFENesin  400 mg Oral TID    sodium chloride flush  5-40 mL IntraVENous 2 times per day    ipratropium 0.5 mg-albuterol 2.5 mg  1 Dose Inhalation Q4H WA RT    insulin glargine  30 Units SubCUTAneous Nightly    insulin lispro  0-8 Units SubCUTAneous TID WC    insulin lispro  0-4 Units SubCUTAneous Nightly     Continuous Infusions:   bumetanide (BUMEX) 12.5 mg in sodium chloride 0.9 % 125 mL infusion 0.5 mg/hr (07/29/24 2030)    sodium chloride      dextrose       PRN Meds:.hydrALAZINE, labetalol, sodium chloride flush, sodium chloride, ondansetron **OR** ondansetron, polyethylene glycol, acetaminophen **OR** acetaminophen, aluminum & magnesium hydroxide-simethicone, guaiFENesin-dextromethorphan, glucose, dextrose bolus **OR** dextrose bolus, glucagon (rDNA), dextrose, perflutren lipid microspheres     Constitutional: Patient is on BiPAP, lethargic  HEENT: Pupils are equal reactive  Respiratory: Decreased breath sounds bilaterally  Cardiovascular/Edema: Heart sounds are regular  Gastrointestinal: Abdomen soft  Neurologic: Lethargic  Skin: No lesions  Other: Trace edema    DATA:    CBC:   Lab Results   Component Value Date/Time    WBC 13.5 07/30/2024 04:54 AM

## 2024-07-30 NOTE — PROGRESS NOTES
Discussed patient and IR procedure with bedside RN, all questions answered. Will call tomorrow after labs have resulted, WNL and when able to send for patient.

## 2024-07-30 NOTE — CARE COORDINATION
7/30:  Update CM Note:  Pt presented to the Er for SOB from home.  Pt is on the continuously BiPAP at 97%, Iv Ferrlecit, Iv Bumex gtt, Iv Labetalol PRN, IV Hydralazine PRN, Uv Doxycycline til 8/4, Iv Maxipime til 8/4 & SQ Heparin.  Pulmonary consulted.  JUSTO done just needs read.  Pt's dc plan is home with [facility].  Per sister his family can obtain his power chair & transport him home.  Sw/NOEMY will continue to follow.  Electronically signed by Cristine Zambrano RN on 7/30/2024 at 12:44 PM

## 2024-07-30 NOTE — PROGRESS NOTES
Wound care; Unable to see patient for wound care visit due to patient having a test at bedside, will attempt to see at a later time. Kayla Casas RN

## 2024-07-31 ENCOUNTER — APPOINTMENT (OUTPATIENT)
Dept: INTERVENTIONAL RADIOLOGY/VASCULAR | Age: 69
End: 2024-07-31
Payer: MEDICARE

## 2024-07-31 ENCOUNTER — APPOINTMENT (OUTPATIENT)
Dept: GENERAL RADIOLOGY | Age: 69
End: 2024-07-31
Payer: MEDICARE

## 2024-07-31 ENCOUNTER — HOSPITAL ENCOUNTER (OUTPATIENT)
Dept: WOUND CARE | Age: 69
Discharge: HOME | End: 2024-07-31
Attending: SURGERY

## 2024-07-31 LAB
ANION GAP SERPL CALCULATED.3IONS-SCNC: 17 MMOL/L (ref 7–16)
APPEARANCE FLD: NORMAL
BASOPHILS # BLD: 0.04 K/UL (ref 0–0.2)
BASOPHILS NFR BLD: 0 % (ref 0–2)
BODY FLD TYPE: NORMAL
BUN SERPL-MCNC: 48 MG/DL (ref 6–23)
CALCIUM SERPL-MCNC: 9.7 MG/DL (ref 8.6–10.2)
CHLORIDE SERPL-SCNC: 96 MMOL/L (ref 98–107)
CLOT CHECK: NORMAL
CO2 SERPL-SCNC: 31 MMOL/L (ref 22–29)
COLOR FLD: NORMAL
CREAT SERPL-MCNC: 1.9 MG/DL (ref 0.7–1.2)
CREAT UR-MCNC: 23.2 MG/DL (ref 40–278)
CREAT UR-MCNC: 23.4 MG/DL (ref 40–278)
EOSINOPHIL # BLD: 0.13 K/UL (ref 0.05–0.5)
EOSINOPHILS RELATIVE PERCENT: 1 % (ref 0–6)
ERYTHROCYTE [DISTWIDTH] IN BLOOD BY AUTOMATED COUNT: 16.1 % (ref 11.5–15)
GFR, ESTIMATED: 37 ML/MIN/1.73M2
GLUCOSE BLD-MCNC: 136 MG/DL (ref 74–99)
GLUCOSE BLD-MCNC: 159 MG/DL (ref 74–99)
GLUCOSE FLD-MCNC: 114 MG/DL
GLUCOSE SERPL-MCNC: 111 MG/DL (ref 74–99)
HCT VFR BLD AUTO: 35.4 % (ref 37–54)
HGB BLD-MCNC: 10.7 G/DL (ref 12.5–16.5)
IMM GRANULOCYTES # BLD AUTO: 0.05 K/UL (ref 0–0.58)
IMM GRANULOCYTES NFR BLD: 1 % (ref 0–5)
INR PPP: 1.3
LDH FLD L TO P-CCNC: 197 U/L
LYMPHOCYTES NFR BLD: 0.84 K/UL (ref 1.5–4)
LYMPHOCYTES RELATIVE PERCENT: 8 % (ref 20–42)
MCH RBC QN AUTO: 23.7 PG (ref 26–35)
MCHC RBC AUTO-ENTMCNC: 30.2 G/DL (ref 32–34.5)
MCV RBC AUTO: 78.3 FL (ref 80–99.9)
MICROALBUMIN UR-MCNC: 229 MG/L (ref 0–19)
MICROALBUMIN/CREAT UR-RTO: 987 MCG/MG CREAT (ref 0–30)
MONOCYTES NFR BLD: 0.76 K/UL (ref 0.1–0.95)
MONOCYTES NFR BLD: 7 % (ref 2–12)
MONOCYTES NFR FLD: 77 %
NEUTROPHILS NFR BLD: 83 % (ref 43–80)
NEUTROPHILS NFR FLD: 23 %
NEUTS SEG NFR BLD: 9.25 K/UL (ref 1.8–7.3)
PARTIAL THROMBOPLASTIN TIME: 33 SEC (ref 24.5–35.1)
PLATELET # BLD AUTO: 261 K/UL (ref 130–450)
PMV BLD AUTO: 11 FL (ref 7–12)
POTASSIUM SERPL-SCNC: 3.7 MMOL/L (ref 3.5–5)
PROT FLD-MCNC: 4 G/DL
PROTHROMBIN TIME: 14.2 SEC (ref 9.3–12.4)
RBC # BLD AUTO: 4.52 M/UL (ref 3.8–5.8)
RBC # FLD: NORMAL CELLS/UL
SODIUM SERPL-SCNC: 144 MMOL/L (ref 132–146)
SPECIMEN TYPE: NORMAL
TOTAL PROTEIN, URINE: 44 MG/DL (ref 0–12)
URINE TOTAL PROTEIN CREATININE RATIO: 1.88 (ref 0–0.2)
WBC # FLD: 1649 CELLS/UL
WBC OTHER # BLD: 11.1 K/UL (ref 4.5–11.5)

## 2024-07-31 PROCEDURE — 6370000000 HC RX 637 (ALT 250 FOR IP)

## 2024-07-31 PROCEDURE — 71045 X-RAY EXAM CHEST 1 VIEW: CPT

## 2024-07-31 PROCEDURE — 88305 TISSUE EXAM BY PATHOLOGIST: CPT

## 2024-07-31 PROCEDURE — 0W993ZZ DRAINAGE OF RIGHT PLEURAL CAVITY, PERCUTANEOUS APPROACH: ICD-10-PCS | Performed by: INTERNAL MEDICINE

## 2024-07-31 PROCEDURE — 88112 CYTOPATH CELL ENHANCE TECH: CPT

## 2024-07-31 PROCEDURE — 36415 COLL VENOUS BLD VENIPUNCTURE: CPT

## 2024-07-31 PROCEDURE — 32555 ASPIRATE PLEURA W/ IMAGING: CPT

## 2024-07-31 PROCEDURE — 85730 THROMBOPLASTIN TIME PARTIAL: CPT

## 2024-07-31 PROCEDURE — 2580000003 HC RX 258

## 2024-07-31 PROCEDURE — 2140000000 HC CCU INTERMEDIATE R&B

## 2024-07-31 PROCEDURE — 6370000000 HC RX 637 (ALT 250 FOR IP): Performed by: INTERNAL MEDICINE

## 2024-07-31 PROCEDURE — 6360000002 HC RX W HCPCS

## 2024-07-31 PROCEDURE — 6360000002 HC RX W HCPCS: Performed by: INTERNAL MEDICINE

## 2024-07-31 PROCEDURE — 85610 PROTHROMBIN TIME: CPT

## 2024-07-31 PROCEDURE — 80048 BASIC METABOLIC PNL TOTAL CA: CPT

## 2024-07-31 PROCEDURE — 2700000000 HC OXYGEN THERAPY PER DAY

## 2024-07-31 PROCEDURE — 82043 UR ALBUMIN QUANTITATIVE: CPT

## 2024-07-31 PROCEDURE — 94640 AIRWAY INHALATION TREATMENT: CPT

## 2024-07-31 PROCEDURE — 84156 ASSAY OF PROTEIN URINE: CPT

## 2024-07-31 PROCEDURE — 94660 CPAP INITIATION&MGMT: CPT

## 2024-07-31 PROCEDURE — C1729 CATH, DRAINAGE: HCPCS

## 2024-07-31 PROCEDURE — 85025 COMPLETE CBC W/AUTO DIFF WBC: CPT

## 2024-07-31 PROCEDURE — 82962 GLUCOSE BLOOD TEST: CPT

## 2024-07-31 PROCEDURE — 82570 ASSAY OF URINE CREATININE: CPT

## 2024-07-31 RX ORDER — BUMETANIDE 1 MG/1
2 TABLET ORAL 2 TIMES DAILY
Status: DISCONTINUED | OUTPATIENT
Start: 2024-08-01 | End: 2024-08-02 | Stop reason: HOSPADM

## 2024-07-31 RX ADMIN — GUAIFENESIN 400 MG: 400 TABLET ORAL at 15:19

## 2024-07-31 RX ADMIN — METOPROLOL TARTRATE 25 MG: 25 TABLET, FILM COATED ORAL at 20:51

## 2024-07-31 RX ADMIN — AMLODIPINE BESYLATE 10 MG: 10 TABLET ORAL at 10:32

## 2024-07-31 RX ADMIN — GUAIFENESIN 400 MG: 400 TABLET ORAL at 20:51

## 2024-07-31 RX ADMIN — SODIUM CHLORIDE 125 MG: 9 INJECTION, SOLUTION INTRAVENOUS at 10:33

## 2024-07-31 RX ADMIN — SODIUM CHLORIDE, PRESERVATIVE FREE 10 ML: 5 INJECTION INTRAVENOUS at 22:03

## 2024-07-31 RX ADMIN — PETROLATUM: 420 OINTMENT TOPICAL at 22:14

## 2024-07-31 RX ADMIN — HEPARIN SODIUM 5000 UNITS: 5000 INJECTION INTRAVENOUS; SUBCUTANEOUS at 20:52

## 2024-07-31 RX ADMIN — METOPROLOL TARTRATE 25 MG: 25 TABLET, FILM COATED ORAL at 10:56

## 2024-07-31 RX ADMIN — IPRATROPIUM BROMIDE AND ALBUTEROL SULFATE 1 DOSE: 2.5; .5 SOLUTION RESPIRATORY (INHALATION) at 16:11

## 2024-07-31 RX ADMIN — PETROLATUM: 420 OINTMENT TOPICAL at 15:22

## 2024-07-31 RX ADMIN — HEPARIN SODIUM 5000 UNITS: 5000 INJECTION INTRAVENOUS; SUBCUTANEOUS at 15:19

## 2024-07-31 RX ADMIN — GUAIFENESIN 400 MG: 400 TABLET ORAL at 10:32

## 2024-07-31 RX ADMIN — IPRATROPIUM BROMIDE AND ALBUTEROL SULFATE 1 DOSE: 2.5; .5 SOLUTION RESPIRATORY (INHALATION) at 21:16

## 2024-07-31 RX ADMIN — INSULIN GLARGINE 30 UNITS: 100 INJECTION, SOLUTION SUBCUTANEOUS at 22:04

## 2024-07-31 ASSESSMENT — PAIN SCALES - GENERAL: PAINLEVEL_OUTOF10: 0

## 2024-07-31 NOTE — FLOWSHEET NOTE
Inpatient Wound Care (Initial consult) 6422A    Admit Date: 7/28/2024  2:02 PM    Reason for consult:  Left heel    Significant history:  Per H&P    Chief Complaint:  had concerns including Cough and Shortness of Breath (tremors).       Findings:     07/31/24 1004   Skin Integumentary    Skin Integrity Ecchymosis   Location BUE   Skin Integrity Site 2   Skin Integrity Location 2   (dry flaky)   Location 2 LLE   Skin Integrity Site 3   Skin Integrity Location 3   (dry flaky)    Location 3 bilateral buttocks   Skin Integrity Site 4   Skin Integrity Location 4 Tear   Location 4 gluteal cleft   Wound 11/15/23 Ankle Left;Lateral #1 left lateral ankle   Date First Assessed/Time First Assessed: 11/15/23 1345   Wound Approximate Age at First Assessment (Weeks): 12 weeks  Primary Wound Type: Venous Ulcer  Location: Ankle  Wound Location Orientation: Left;Lateral  Wound Description (Comments): #1 left la...   Wound Image    Wound Etiology Pressure Stage 3   Dressing Status New dressing applied   Wound Cleansed Cleansed with saline   Dressing/Treatment ABD;Alginate;Dry dressing;Roll gauze   Wound Length (cm) 8 cm   Wound Width (cm) 3.8 cm   Wound Depth (cm) 0.3 cm   Wound Surface Area (cm^2) 30.4 cm^2   Change in Wound Size % (l*w) 59.84   Wound Volume (cm^3) 9.12 cm^3   Wound Healing % 91   Wound Assessment [place]/red;[place]   Drainage Amount Scant (moist but unmeasurable)   Drainage Description Serosanguinous   Odor None   Nadia-wound Assessment Dry/flaky       **Informed Consent**    The patient has given verbal consent to have photos taken of wound and inserted into their chart as part of their permanent medical record for purposes of documentation, treatment management and/or medical review.   All Images taken on 7/31/24 of patient name: Florentin Stark were transmitted and stored on secured Epic  Site located within Media Folder Tab by a registered Epic-[employer] Mobile Application Device.        Impression:  Left lateral

## 2024-07-31 NOTE — OP NOTE
Operative Note  ____________________________________________________________      IR U/S GUIDED THORACENTESIS  [facility] 6WE IMCU    Patient Name: Florentin Stark   YOB: 1955  Medical Record Number: [id number]  Date of Procedure: 7/31/24  Room/Bed: 64/6422-A    Preoperative diagnosis: right Pleural Effusion    Postoperative diagnosis: Same    Consent: The patient was counseled regarding the procedure, it's indications, risks, potential complications and alternatives and any questions were answered. Consent was obtained for image guided right thoracentesis for Pleural effusion.     Procedure:  Image Guided right Thoracentesis.    Performed by: LORRAINE Hi under on-site supervision by Valentino Abebe MD.    Anesthesia: Local anesthesia with approximately 10 mL of 2% Lidocaine without epinephrine administered subcutaneously.    Estimated blood loss: Less than 10 mL    Complications: None    Specimen Obtained: Pleural Fluid    Procedure: Prior to start of procedure, routine scanning of the posterior thorax was performed using real-time ultrasound and revealed sufficient amount of pleural fluid present. Decision was made to proceed with procedure.  After obtaining consent, a \"Time-out\" was called to verify the correct patient, procedure/location, allergies, relevant medications held for procedure and that all equipment is functioning and available. The patient was then situated in a seated upright position and the appropriate landmarks were identified using ultrasound. The site of maximum fluid collection was marked. The skin over the puncture site in the right lower posterior hemithorax region was prepped with surgical skin prep and draped in a sterile fashion. Local anesthesia was administered by infiltration using 1% Lidocaine without epinephrine. A 6 Sinhala safe-t-centesis catheter was then advanced into the pleural cavity and the needle was withdrawn.  Pleural fluid return was serosanguinous colored.       A total volume of  700mL was withdrawn. The catheter was then withdrawn and a sterile dressing was placed over the site.  The patient tolerated the procedure well.     Complications: None.     Estimated Blood Loss: < 10cc.   .      Thank you for allowing Interventional Radiology to participate in the care of Florentin Stark.     Electronically signed by LORRAINE Hi   DD: 7/31/24  4:47 PM

## 2024-07-31 NOTE — PROGRESS NOTES
Department of Internal Medicine  Nephrology Attending Consult Note    Events reviewed.    SUBJECTIVE: We are following Florentin Stark for COLE. Patient reports increased shortness of breath.         Vitals:    VITALS:  BP (!) 177/79   Pulse 83   Temp 98 °F (36.7 °C) (Oral)   Resp 14   Ht 1.88 m (6' 2\")   Wt (!) 136.3 kg (300 lb 9.5 oz)   SpO2 92%   BMI 38.59 kg/m²   24HR INTAKE/OUTPUT:    Intake/Output Summary (Last 24 hours) at 7/31/2024 1033  Last data filed at 7/31/2024 0909  Gross per 24 hour   Intake 0 ml   Output 6550 ml   Net -6550 ml       Scheduled Meds:   amLODIPine  10 mg Oral Daily    ferric gluconate (FERRLECIT) 125 mg in sodium chloride 0.9 % 100 mL IVPB  125 mg IntraVENous Daily    heparin (porcine)  5,000 Units SubCUTAneous Q8H    guaiFENesin  400 mg Oral TID    sodium chloride flush  5-40 mL IntraVENous 2 times per day    ipratropium 0.5 mg-albuterol 2.5 mg  1 Dose Inhalation Q4H WA RT    insulin glargine  30 Units SubCUTAneous Nightly    insulin lispro  0-8 Units SubCUTAneous TID WC    insulin lispro  0-4 Units SubCUTAneous Nightly     Continuous Infusions:   bumetanide (BUMEX) 12.5 mg in sodium chloride 0.9 % 125 mL infusion 0.5 mg/hr (07/30/24 1901)    sodium chloride      dextrose       PRN Meds:.hydrALAZINE, labetalol, sodium chloride flush, sodium chloride, ondansetron **OR** ondansetron, polyethylene glycol, acetaminophen **OR** acetaminophen, aluminum & magnesium hydroxide-simethicone, guaiFENesin-dextromethorphan, glucose, dextrose bolus **OR** dextrose bolus, glucagon (rDNA), dextrose, perflutren lipid microspheres     Constitutional: Patient is on BiPAP, lethargic  HEENT: Pupils are equal reactive  Respiratory: Decreased breath sounds bilaterally  Cardiovascular/Edema: Heart sounds are regular  Gastrointestinal: Abdomen soft  Neurologic: Lethargic  Skin: No lesions  Other: Trace edema    DATA:    CBC:   Lab Results   Component Value Date/Time    WBC 11.1 07/31/2024 05:00 AM    RBC  for this consultation.  His medications prior to admission included losartan 25 mg daily, Bumex 1 mg daily, chlorthalidone 25 mg daily.    IMPRESSION/RECOMMENDATIONS:      COLE on CKD, probably hemodynamically mediated due to decompensated heart failure?  versus intravascular volume depletion in the setting of ARB administration, proBNP 2,074.  Creatinine improved to 1.9 mg/dL with excellent urinary output 7L    CKD stage 3b with proteinuria 2/2 nephrosclerosis and diabetic nephropathy, baseline Creatinine 2.0 mg/dL, UPCR 1.88, UACR 987    HTN, on amlodipine, resume metoprolol  HFpEF 60%, proBNP 2024  Normocytic anemia, ferritin 346, iron 29, iron saturation 18%, folate 5.6, vitamin B12 572, on IV iron  Pulmonary edema, continue Bumex gtt, for right thoracentesis  ------------------------------------------------------------  Respiratory failure, on continuous BIPAP  Pneumonia, on cefepime and doxycyline   PE?, D-Dimer 1,298 ng/mL, awaiting VQ scan  Type II DM, on SSI  Hx of DVT, on heparin SQ  HLD  RAGHU, on BiPAP   PVD, follows with vascular surgery, s/p angiogram 9/29/23  Vitamin D deficiency, on Ergocalciferol 1.25 mg weekly   Left leg wound s/p debridement, follows with vascular surgery       Plan :     Continue Bumex drip at 0.5 mg/hour  Continue amlodipine 10 mg PO daily  Start metoprolol 25 mg p.o. twice daily  Continue ferrlecit 125 mg IV daily x 4 doses  Continue to monitor renal function   Continue to monitor blood pressure  Strict intake and output    Electronically signed by SHONDA Gold - CNP on 7/31/2024 at 10:33 AM     I saw and evaluated the patient, performing the key elements of the service. I discussed wthe findings , assessment and plan with NP and agree with her findings and plans as documented in her note.    Nighat Khanna MD

## 2024-07-31 NOTE — CARE COORDINATION
7/31:  Update CM Note:  Pt presented to the ER for SOB from home.  Pt is on 6L/NC at 92%, Iv Ferrlecit, IV Bumex gtt, Iv Labetalol PRN, Iv Apresoline PRN & Sq Heparin.  Pt had a thoracentesis today.  CM spoke with pt who dc plan is home & his family can transport.  Pt is active with [facility] order placed.  Sw/NOEMY will continue to follow.  Electronically signed by Cristine Zambrano RN on 7/31/2024 at 1:41 PM

## 2024-07-31 NOTE — PROGRESS NOTES
Venkata Espinoza M.D.,[facility]  Jesse Jarrell D.O., F.RAFATORUBÉN., [facility]  Miles Camarillo M.D.  Nancy Hogan M.D.   Phil Erwin D.O.  Agustín Evans M.D.         Daily Pulmonary Progress Note    Patient:  Florentin Stark 68 y.o. male MRN: [id number]            Synopsis     We are following patient for pneumonia    \"CC\" cough, SOB    Code status: FULL CODE      Subjective      Patient was seen and examined lying in bed on 6 liters NC, SpO2 94%. He is awake and alert and oriented. He is waiting to go to IR for thoracentesis. On Bumex drip.      Review of Systems:  Constitutional: Denies fever, weight loss, night sweats, and fatigue  Skin: Denies pigmentation, dark lesions, and rashes   HEENT: Denies hearing loss, tinnitus, ear drainage, epistaxis, sore throat, and hoarseness.  Cardiovascular: Denies palpitations, chest pain, and chest pressure.  Respiratory: cough, chest tightness, shortness of breath  Gastrointestinal: Denies nausea, vomiting, poor appetite, diarrhea, heartburn or reflux  Genitourinary: Denies dysuria, frequency, urgency or hematuria  Musculoskeletal: Denies myalgias, muscle weakness, and bone pain  Neurological: Denies dizziness, vertigo, headache, and focal weakness  Psychological: Denies anxiety and depression  Endocrine: Denies heat intolerance and cold intolerance  Hematopoietic/Lymphatic: Denies bleeding problems and blood transfusions    24-hour events:  No new events    Objective   OBJECTIVE:   BP (!) 177/79   Pulse 83   Temp 98 °F (36.7 °C) (Oral)   Resp 14   Ht 1.88 m (6' 2\")   Wt (!) 136.3 kg (300 lb 9.5 oz)   SpO2 92%   BMI 38.59 kg/m²   SpO2 Readings from Last 1 Encounters:   07/31/24 92%        I/O:    Intake/Output Summary (Last 24 hours) at 7/31/2024 1316  Last data filed at 7/31/2024 0909  Gross per 24 hour   Intake 0 ml   Output 5750 ml   Net -5750 ml               IPAP: 14 cmH20  CPAP/EPAP: 10 cmH2O     CURRENT MEDS :  Scheduled Meds:   metoprolol tartrate  25 mg

## 2024-07-31 NOTE — DISCHARGE INSTRUCTIONS
Your information:  Name: Florentin Stark  : 1955    Your instructions:    Home Health care for dressing change:Left lateral ankle: clean with normal saline, apply opticell then cover with 4x4, abd pad and wrap with kerlix. Change dressing daily     What to do after you leave the hospital:    Recommended diet: {diet:31599}    Recommended activity: {discharge activity:64169}        The following personal items were collected during your admission and were returned to you:    Belongings  Dental Appliances: None  Vision - Corrective Lenses: None  Hearing Aid: None  Clothing: At bedside  Jewelry: None  Electronic Devices: Cell Phone  Weapons (Notify Protective Services/Security): None  Home Medications: None  Valuables Given To: Patient  Provide Name(s) of Who Valuable(s) Were Given To: Chris    Information obtained by:  By signing below, I understand that if any problems occur once I leave the hospital I am to contact ***.  I understand and acknowledge receipt of the instructions indicated above.

## 2024-07-31 NOTE — PRE-PROCEDURE INSTRUCTIONS
Special Procedures/Interventional Radiology Pre-Procedure Evaluation    Patient Name: Florentin Stark  MRN: [id number]  : 1955  Date of Procedure: 24  Planned Procedure: Right Thoracentesis    Is the patient alert, oriented, and capable of providing informed procedural consent? Yes  Has the patient adhered to NPO status since midnight? Yes  Are recent lab results within acceptable parameters to safely proceed with the procedure? Yes  Has medical/surgical/allergy history been reviewed? Yes  Have anticoagulant medications been appropriately withheld as per protocol? Yes  Is the patient receiving intravenous antibiotic therapy? Yes    I have discussed and reviewed this information with the patient's primary RN, Corby . Plan to proceed with the procedure today. However, the exact timing of the procedure cannot be specified at this moment. There is also a possibility that the procedure may be rescheduled due to the department's caseload and prioritization of cases.

## 2024-07-31 NOTE — PROGRESS NOTES
[facility] Hospitalist Progress Note      Synopsis: Patient with a history of diabetes, PAD, AKA, chronic hypoxic respiratory failure on 4 L at baseline, admitted on 7/28/2024 after presenting to the ED with shortness of breath and increased oxygen requirements.  Patient has a history of left foot wound with infection which he follows with vascular and podiatry for as well as a right AKA.  Patient found to have community-acquired pneumonia as well as COLE on CKD.  Started on antibiotics, continues with oxygen support.  Echo ordered to evaluate for heart failure. Antibiotics have since been discontinued due to lower concern of pneumonia. Pt for IR guided thoracentesis     Subjective  Pt reports improvement in shortness of breath     Exam:  BP (!) 177/79   Pulse 83   Temp 98 °F (36.7 °C) (Oral)   Resp 14   Ht 1.88 m (6' 2\")   Wt (!) 136.3 kg (300 lb 9.5 oz)   SpO2 92%   BMI 38.59 kg/m²   Pt in IR undergoing thoracentesis at the time of rounds    Medications:  Reviewed    Infusion Medications    bumetanide (BUMEX) 12.5 mg in sodium chloride 0.9 % 125 mL infusion 0.5 mg/hr (07/30/24 1901)    sodium chloride      dextrose       Scheduled Medications    metoprolol tartrate  25 mg Oral BID    amLODIPine  10 mg Oral Daily    ferric gluconate (FERRLECIT) 125 mg in sodium chloride 0.9 % 100 mL IVPB  125 mg IntraVENous Daily    heparin (porcine)  5,000 Units SubCUTAneous Q8H    guaiFENesin  400 mg Oral TID    sodium chloride flush  5-40 mL IntraVENous 2 times per day    ipratropium 0.5 mg-albuterol 2.5 mg  1 Dose Inhalation Q4H WA RT    insulin glargine  30 Units SubCUTAneous Nightly    insulin lispro  0-8 Units SubCUTAneous TID WC    insulin lispro  0-4 Units SubCUTAneous Nightly     PRN Meds: hydrALAZINE, labetalol, sodium chloride flush, sodium chloride, ondansetron **OR** ondansetron, polyethylene glycol, acetaminophen **OR** acetaminophen, aluminum & magnesium hydroxide-simethicone, guaiFENesin-dextromethorphan,

## 2024-07-31 NOTE — BRIEF OP NOTE
Brief-Op Note  ____________________________________________________________      IR  U/S GUIDED THORACENTESIS  [facility] 6WE IMCU    Patient Name: Florentin Stark   YOB: 1955  Medical Record Number: [id number]  Date of Procedure: 7/31/24  Room/Bed: [address] [place] [address]    Preoperative diagnosis: right Pleural Effusion    Postoperative diagnosis: Same    Consent: The patient was counseled regarding the procedure, it's indications, risks, potential complications and alternatives and any questions were answered.     Procedure:  Image Guided right Thoracentesis.    Anesthesia: Local anesthesia.    Performed by: LORRAINE Hi under on-site supervision by Valentino Abebe MD.    Estimated blood loss: Less than 10 mL    Complications: None    Specimen Obtained:  700mL of serosanguinous colored pleural fluid was withdrawn.    Electronically signed by LORRAINE Hi   DD: 7/31/24  4:46 PM

## 2024-08-01 ENCOUNTER — APPOINTMENT (OUTPATIENT)
Dept: CARDIOLOGY | Age: 69
End: 2024-08-01
Payer: MEDICARE

## 2024-08-01 LAB
ANION GAP SERPL CALCULATED.3IONS-SCNC: 14 MMOL/L (ref 7–16)
BASOPHILS # BLD: 0.03 K/UL (ref 0–0.2)
BASOPHILS NFR BLD: 0 % (ref 0–2)
BUN SERPL-MCNC: 48 MG/DL (ref 6–23)
CALCIUM SERPL-MCNC: 9.2 MG/DL (ref 8.6–10.2)
CHLORIDE SERPL-SCNC: 90 MMOL/L (ref 98–107)
CO2 SERPL-SCNC: 35 MMOL/L (ref 22–29)
CREAT SERPL-MCNC: 2.1 MG/DL (ref 0.7–1.2)
EOSINOPHIL # BLD: 0.17 K/UL (ref 0.05–0.5)
EOSINOPHILS RELATIVE PERCENT: 1 % (ref 0–6)
ERYTHROCYTE [DISTWIDTH] IN BLOOD BY AUTOMATED COUNT: 15.9 % (ref 11.5–15)
GFR, ESTIMATED: 33 ML/MIN/1.73M2
GLUCOSE BLD-MCNC: 124 MG/DL (ref 74–99)
GLUCOSE BLD-MCNC: 181 MG/DL (ref 74–99)
GLUCOSE BLD-MCNC: 224 MG/DL (ref 74–99)
GLUCOSE BLD-MCNC: 231 MG/DL (ref 74–99)
GLUCOSE SERPL-MCNC: 126 MG/DL (ref 74–99)
HCT VFR BLD AUTO: 37 % (ref 37–54)
HGB BLD-MCNC: 11.7 G/DL (ref 12.5–16.5)
IMM GRANULOCYTES # BLD AUTO: 0.09 K/UL (ref 0–0.58)
IMM GRANULOCYTES NFR BLD: 1 % (ref 0–5)
LYMPHOCYTES NFR BLD: 1.31 K/UL (ref 1.5–4)
LYMPHOCYTES RELATIVE PERCENT: 11 % (ref 20–42)
MAGNESIUM SERPL-MCNC: 1.4 MG/DL (ref 1.6–2.6)
MCH RBC QN AUTO: 24.7 PG (ref 26–35)
MCHC RBC AUTO-ENTMCNC: 31.6 G/DL (ref 32–34.5)
MCV RBC AUTO: 78.1 FL (ref 80–99.9)
MONOCYTES NFR BLD: 0.9 K/UL (ref 0.1–0.95)
MONOCYTES NFR BLD: 7 % (ref 2–12)
NEUTROPHILS NFR BLD: 80 % (ref 43–80)
NEUTS SEG NFR BLD: 9.87 K/UL (ref 1.8–7.3)
PARTIAL THROMBOPLASTIN TIME: 32.2 SEC (ref 24.5–35.1)
PLATELET # BLD AUTO: 260 K/UL (ref 130–450)
PMV BLD AUTO: 10.8 FL (ref 7–12)
POTASSIUM SERPL-SCNC: 3.5 MMOL/L (ref 3.5–5)
RBC # BLD AUTO: 4.74 M/UL (ref 3.8–5.8)
SODIUM SERPL-SCNC: 139 MMOL/L (ref 132–146)
WBC OTHER # BLD: 12.4 K/UL (ref 4.5–11.5)

## 2024-08-01 PROCEDURE — 99232 SBSQ HOSP IP/OBS MODERATE 35: CPT | Performed by: INTERNAL MEDICINE

## 2024-08-01 PROCEDURE — 2580000003 HC RX 258

## 2024-08-01 PROCEDURE — 6370000000 HC RX 637 (ALT 250 FOR IP)

## 2024-08-01 PROCEDURE — 80048 BASIC METABOLIC PNL TOTAL CA: CPT

## 2024-08-01 PROCEDURE — 94640 AIRWAY INHALATION TREATMENT: CPT

## 2024-08-01 PROCEDURE — 94660 CPAP INITIATION&MGMT: CPT

## 2024-08-01 PROCEDURE — 6360000002 HC RX W HCPCS

## 2024-08-01 PROCEDURE — 36415 COLL VENOUS BLD VENIPUNCTURE: CPT

## 2024-08-01 PROCEDURE — 97530 THERAPEUTIC ACTIVITIES: CPT

## 2024-08-01 PROCEDURE — 6370000000 HC RX 637 (ALT 250 FOR IP): Performed by: INTERNAL MEDICINE

## 2024-08-01 PROCEDURE — 85730 THROMBOPLASTIN TIME PARTIAL: CPT

## 2024-08-01 PROCEDURE — 2700000000 HC OXYGEN THERAPY PER DAY

## 2024-08-01 PROCEDURE — 82962 GLUCOSE BLOOD TEST: CPT

## 2024-08-01 PROCEDURE — 2140000000 HC CCU INTERMEDIATE R&B

## 2024-08-01 PROCEDURE — 85025 COMPLETE CBC W/AUTO DIFF WBC: CPT

## 2024-08-01 PROCEDURE — 83735 ASSAY OF MAGNESIUM: CPT

## 2024-08-01 PROCEDURE — 97165 OT EVAL LOW COMPLEX 30 MIN: CPT

## 2024-08-01 PROCEDURE — 97161 PT EVAL LOW COMPLEX 20 MIN: CPT

## 2024-08-01 PROCEDURE — 6360000002 HC RX W HCPCS: Performed by: INTERNAL MEDICINE

## 2024-08-01 RX ORDER — METOPROLOL TARTRATE 50 MG/1
50 TABLET, FILM COATED ORAL 2 TIMES DAILY
Status: DISCONTINUED | OUTPATIENT
Start: 2024-08-01 | End: 2024-08-02

## 2024-08-01 RX ORDER — MAGNESIUM SULFATE IN WATER 40 MG/ML
4000 INJECTION, SOLUTION INTRAVENOUS ONCE
Status: COMPLETED | OUTPATIENT
Start: 2024-08-01 | End: 2024-08-01

## 2024-08-01 RX ADMIN — GUAIFENESIN 400 MG: 400 TABLET ORAL at 08:57

## 2024-08-01 RX ADMIN — AMLODIPINE BESYLATE 10 MG: 10 TABLET ORAL at 08:57

## 2024-08-01 RX ADMIN — IPRATROPIUM BROMIDE AND ALBUTEROL SULFATE 1 DOSE: 2.5; .5 SOLUTION RESPIRATORY (INHALATION) at 16:35

## 2024-08-01 RX ADMIN — PETROLATUM: 420 OINTMENT TOPICAL at 20:54

## 2024-08-01 RX ADMIN — METOPROLOL TARTRATE 50 MG: 50 TABLET, FILM COATED ORAL at 20:54

## 2024-08-01 RX ADMIN — IPRATROPIUM BROMIDE AND ALBUTEROL SULFATE 1 DOSE: 2.5; .5 SOLUTION RESPIRATORY (INHALATION) at 08:22

## 2024-08-01 RX ADMIN — SODIUM CHLORIDE, PRESERVATIVE FREE 10 ML: 5 INJECTION INTRAVENOUS at 08:58

## 2024-08-01 RX ADMIN — SODIUM CHLORIDE 125 MG: 9 INJECTION, SOLUTION INTRAVENOUS at 09:02

## 2024-08-01 RX ADMIN — SODIUM CHLORIDE, PRESERVATIVE FREE 5 ML: 5 INJECTION INTRAVENOUS at 20:55

## 2024-08-01 RX ADMIN — HEPARIN SODIUM 5000 UNITS: 5000 INJECTION INTRAVENOUS; SUBCUTANEOUS at 20:55

## 2024-08-01 RX ADMIN — HEPARIN SODIUM 5000 UNITS: 5000 INJECTION INTRAVENOUS; SUBCUTANEOUS at 13:51

## 2024-08-01 RX ADMIN — GUAIFENESIN 400 MG: 400 TABLET ORAL at 20:54

## 2024-08-01 RX ADMIN — GUAIFENESIN 400 MG: 400 TABLET ORAL at 13:51

## 2024-08-01 RX ADMIN — METOPROLOL TARTRATE 25 MG: 25 TABLET, FILM COATED ORAL at 08:57

## 2024-08-01 RX ADMIN — INSULIN LISPRO 2 UNITS: 100 INJECTION, SOLUTION INTRAVENOUS; SUBCUTANEOUS at 17:13

## 2024-08-01 RX ADMIN — IPRATROPIUM BROMIDE AND ALBUTEROL SULFATE 1 DOSE: 2.5; .5 SOLUTION RESPIRATORY (INHALATION) at 20:34

## 2024-08-01 RX ADMIN — PETROLATUM: 420 OINTMENT TOPICAL at 08:58

## 2024-08-01 RX ADMIN — BUMETANIDE 2 MG: 1 TABLET ORAL at 17:12

## 2024-08-01 RX ADMIN — HEPARIN SODIUM 5000 UNITS: 5000 INJECTION INTRAVENOUS; SUBCUTANEOUS at 05:36

## 2024-08-01 RX ADMIN — MAGNESIUM SULFATE HEPTAHYDRATE 4000 MG: 40 INJECTION, SOLUTION INTRAVENOUS at 10:50

## 2024-08-01 RX ADMIN — INSULIN GLARGINE 30 UNITS: 100 INJECTION, SOLUTION SUBCUTANEOUS at 20:54

## 2024-08-01 RX ADMIN — IPRATROPIUM BROMIDE AND ALBUTEROL SULFATE 1 DOSE: 2.5; .5 SOLUTION RESPIRATORY (INHALATION) at 11:15

## 2024-08-01 RX ADMIN — BUMETANIDE 2 MG: 1 TABLET ORAL at 08:57

## 2024-08-01 ASSESSMENT — PAIN SCALES - GENERAL: PAINLEVEL_OUTOF10: 0

## 2024-08-01 NOTE — PROGRESS NOTES
OCCUPATIONAL THERAPY INITIAL EVALUATION    [facility]  [address], [place]          Date:2024                                                   Patient Name: Florentin Stark     MRN: [id number]     : 1955     Room: [address] [place] [address]    Evaluating OT: Daniella Olmos, OTD,  OTR/L; [id number]    Referring Provider:     Mary Landers MD      Specific Provider Orders/Date: OT eval and treat (24)       Diagnosis: Pericardial effusion [I31.39]  Intracranial bleed (HCC) [I62.9]  Traumatic pneumothorax, initial encounter [S27.0XXA]  Closed fracture of orbit, initial encounter (HCC) [S02.85XA]  Fall, initial encounter [W19.XXXA]  Closed fracture of multiple ribs of right side, initial encounter [S22.41XA]  Traumatic brain injury of unknown intent (HCC) [S06.9XAA]  Trauma [T14.90XA]     Reason for admission: Pt admitted with SOB, cough, PNA.    Surgery/Procedures: : thoracentesis     Pertinent Medical History:   No past medical history on file.     *Precautions:  Fall Risk, 4L baseline, RLE AKA, alarms+    Assessment of current deficits   [x] Functional mobility  [x]ADLs  [x] Strength               []Cognition   [x] Functional transfers   [x] IADLs         [x] Safety Awareness   [x]Endurance   [] Fine Coordination        [x] ROM     [] Vision/perception   []Sensation    []Gross Motor Coordination [x] Balance   [] Delirium                  []Motor Control     [] Communication    OT PLAN OF CARE   OT POC based on physician orders, patient diagnosis and results of clinical assessment.       Frequency/Duration: 1-3 days/wk for 1-2 weeks PRN    Specific OT Treatment Interventions to include:   * Instruction/training on adapted ADL techniques and AE recommendations to increase functional independence within precautions       * Training on energy conservation strategies, correct breathing pattern and techniques to improve independence/tolerance for  Function: Assist with ADLs;  Assist with IADLs. Pt reports having aids daily who complete all ADL/IADL tasks.  Power WC for functional mobility.   Driving: NA  Occupation: none stated    Pain Level: pt c/o 0/10 pain this session    Cognition: Follows 1-2 step commands    Memory: G-   Comprehension: G-   Problem solving: F+   Judgement/safety: F+               Communication skills: Fair- easily agitated         Glasses:none present                                                   Hearing: WFL    Vitals Assessed  SpO2:WNL throughout session on 3L      BP: NT     Functional Assessment:  AM-PAC Daily Activity Raw Score: 14/24   Initial Eval Status  Date: 8/1/24 Treatment Status  Date: STGs = LTGs  Time frame: 10-14 days   Feeding Set-up        Grooming Minimal Assist     Modified [place]    UB Dressing Moderate Assist     Stand by Assist    LB Dressing Maximal Assist     Moderate Assist    Bathing Maximal Assist    Minimal Assist    Toileting Maximal Assist     Minimal Assist    Bed Mobility  Supine to sit: NT   Sit to supine: NT     Supine to sit: Modified [place]   Sit to supine: Modified [place]    Functional Transfers Sit to stand: Max A progressing to Min A x2  Stand to sit: Max A progressing to Min A  Stand pivot: Min A with FWW     Supervision    Functional Mobility NT     NA  Power WC at baseline.      Balance Sitting:     Static:  SBA    Dynamic:Min A  Standing: Max A progressing to Min A    during functional activity  Sitting:     Static: Ind    Dynamic: Sup  Standing: Sup   Activity Tolerance Fair tolerance with light activity  WFL  For full ADL   Visual/  Perceptual Glasses: none present        Safety F+  G        AROM (PROM) Strength Additional Info:    RUE  Functionally Assessed WFL Functionally Assessed WFL WFL  and wfl FMC/dexterity noted during ADL tasks       LUE Functionally Assessed WFL Functionally Assessed WFL WFL  and wfl FMC/dexterity noted during ADL tasks       Hearing:

## 2024-08-01 NOTE — PROGRESS NOTES
Department of Internal Medicine  Nephrology Attending Consult Note    Events reviewed.    SUBJECTIVE: We are following Florentin Stark for COLE. Patient reports feeling better.         Vitals:    VITALS:  BP (!) 170/80   Pulse 80   Temp 98.1 °F (36.7 °C) (Oral)   Resp 18   Ht 1.88 m (6' 2\")   Wt 131.5 kg (290 lb)   SpO2 96%   BMI 37.23 kg/m²   24HR INTAKE/OUTPUT:    Intake/Output Summary (Last 24 hours) at 8/1/2024 0925  Last data filed at 8/1/2024 0535  Gross per 24 hour   Intake 280 ml   Output 2700 ml   Net -2420 ml       Scheduled Meds:   magnesium sulfate  4,000 mg IntraVENous Once    metoprolol tartrate  25 mg Oral BID    white petrolatum   Topical BID    bumetanide  2 mg Oral BID    amLODIPine  10 mg Oral Daily    ferric gluconate (FERRLECIT) 125 mg in sodium chloride 0.9 % 100 mL IVPB  125 mg IntraVENous Daily    heparin (porcine)  5,000 Units SubCUTAneous Q8H    guaiFENesin  400 mg Oral TID    sodium chloride flush  5-40 mL IntraVENous 2 times per day    ipratropium 0.5 mg-albuterol 2.5 mg  1 Dose Inhalation Q4H WA RT    insulin glargine  30 Units SubCUTAneous Nightly    insulin lispro  0-8 Units SubCUTAneous TID WC    insulin lispro  0-4 Units SubCUTAneous Nightly     Continuous Infusions:   sodium chloride      dextrose       PRN Meds:.white petrolatum **AND** white petrolatum, hydrALAZINE, labetalol, sodium chloride flush, sodium chloride, ondansetron **OR** ondansetron, polyethylene glycol, acetaminophen **OR** acetaminophen, aluminum & magnesium hydroxide-simethicone, guaiFENesin-dextromethorphan, glucose, dextrose bolus **OR** dextrose bolus, glucagon (rDNA), dextrose, perflutren lipid microspheres     Constitutional: Patient is on BiPAP, lethargic  HEENT: Pupils are equal reactive  Respiratory: Decreased breath sounds bilaterally  Cardiovascular/Edema: Heart sounds are regular  Gastrointestinal: Abdomen soft  Neurologic: Lethargic  Skin: No lesions  Other: Trace edema    DATA:    CBC:   Lab

## 2024-08-01 NOTE — PROGRESS NOTES
7/30/24:  FINDINGS:  Mediastinum: Thyroid is homogeneous in attenuation. No bulky mediastinal  adenopathy. Central airways are patent. Esophagus with normal course and  caliber.  Cardiac size enlarged without pericardial effusion.  Coronary and  valvular calcifications.     Lungs/pleura: Lungs demonstrate hyperinflation of likely COPD background  findings and minimal emphysema with superimposed interstitial pulmonary edema  pattern septal thickening mosaicism of a slight lower lobe predominance with  moderate to large right and small to moderate left pleural effusions and  adjacent compressive atelectasis.  No separate consolidative opacifications.     Upper Abdomen: Visualized portions of the upper abdomen unremarkable.     Soft Tissues/Bones: No acute pathologic fracture with area of stable lucency  T11 vertebral level and mild heterogeneity adjacent similar to 01/16/2023.  Bilateral gynecomastia with areas of central hypoattenuating areas posterior  to the nipple 1.8 cm right and 1.7 cm left seen on series 301 image 30 and  series 301 image 55 respectively.     IMPRESSION:  1. Cardiomegaly with interstitial pulmonary edema pattern and moderate to  large right and small to moderate left pleural effusions.  2. Underlying chronic changes likely COPD.  3. Bilateral gynecomastia with areas of central hypoattenuating areas  posterior to the nipple 1.8 cm right and 1.7 cm left seen respectively.  Indeterminate for fluid densities or central necrotic areas of soft tissue  involvement given degree of gynecomastia.  Recommend correlation with  physical exam.  4. Stable lucency T11 vertebral level and mild heterogeneity adjacent to  01/16/2023.        Echo 7/30/24:  Interpretation Summary    Left Ventricle: Normal left ventricular systolic function with a visually estimated EF of 50 - 55%. Left ventricle size is normal. Normal wall thickness. Normal wall motion. Indeterminate diastolic function.    Right Ventricle: Right     CO2 35 08/01/2024 05:00 AM    BUN 48 08/01/2024 05:00 AM    CREATININE 2.1 08/01/2024 05:00 AM    CALCIUM 9.2 08/01/2024 05:00 AM    GFRAA 49 08/18/2022 05:22 PM    LABGLOM 33 08/01/2024 05:00 AM    LABGLOM 37 04/09/2024 11:45 AM     Lab Results   Component Value Date/Time    PROTIME 14.2 07/31/2024 05:00 AM    INR 1.3 07/31/2024 05:00 AM       Recent Labs     07/30/24  0454   PROCAL 0.32*        Latest Reference Range & Units 07/30/24 14:00   Appearance, Fluid  Cloudy   Color, Fluid  Pink   RBC, Fluid cells/uL 22,000   Glucose, Fluid mg/dL 114   LD, Fluid U/L 197   Monocyte Count, Fluid % 77   Neutrophil Count, Fluid % 23   Total Protein, Body Fluid g/dL 4.0   WBC, Fluid cells/uL 1,649   Clot Check  NONE SEEN        Assessment:    Acute on chronic hypoxic respiratory failure  Bilateral pleural effusions, R>L, s/p right thoracentesis 7/31/24, 700 ml exudate removed  Atelectasis   Pulmonary edema/volume overload  Obstructive sleep apnea  Obesity, BMI 39  COLE on CKD, stage 3b  HFpEF      Plan:   Wean oxygen as tolerated to maintain SpO2 greater than 92%, currently on 3 liters  BiPap 14/10 at HS and PRN  No evidence of pneumonia, continue to monitor off antibiotics   Chest ct reviewed - bilateral pleural effusions R>L, atelectasis   Right thoracentesis completed 7/31, 700 ml removed, appears exudate likely from diuretic use. Follow cultures and cytology  Post procedure CXR negative for ptx, significant improvement in effusions and edema  DuoNebs q4h WA  Diuretic therapy per Nephrology - Bumex infusion stopped, transitioned to 2 mg PO BID. 4 L UOP yesterday  Echocardiogram reviewed - EF 50-55%  Incentive spirometry, flutter valve, EZPAP  Patient stable from a pulmonary standpoint, continue to wean oxygen. No follow up needed at discharge      This plan of care was reviewed in collaboration with Dr. Erwin    Electronically signed by SHONDA Bach - CNP on 8/1/2024 at 1:17 PM    I personally saw, examined, and  cared for the patient. I performed the substantive portion of the visit. Labs, medications, radiographs reviewed. I agree with history exam and plans detailed in NP note.        Phil Erwin, DO

## 2024-08-01 NOTE — PROGRESS NOTES
[facility] Hospitalist Progress Note      Synopsis: Patient with a history of diabetes, PAD, AKA, chronic hypoxic respiratory failure on 4 L at baseline, admitted on 7/28/2024 after presenting to the ED with shortness of breath and increased oxygen requirements.  Patient has a history of left foot wound with infection which he follows with vascular and podiatry for as well as a right AKA.  Patient found to have community-acquired pneumonia as well as COLE on CKD.  Started on antibiotics, continues with oxygen support.  Echo ordered to evaluate for heart failure. Antibiotics have since been discontinued due to lower concern of pneumonia. Pt for IR guided thoracentesis and had 700 cc of pleural fluid drawn, await culture and cytology results.     Subjective  Pt reports improvement in shortness of breath     Exam:  BP (!) 170/80   Pulse 80   Temp 98.1 °F (36.7 °C) (Oral)   Resp 18   Ht 1.88 m (6' 2\")   Wt 131.5 kg (290 lb)   SpO2 96%   BMI 37.23 kg/m²     General appearance: No apparent distress, appears stated age and cooperative.  HEENT: Conjunctivae/corneas clear. Mucous membranes moist.  Neck: Supple. No JVD.  Respiratory: Rhonchi in upper airway and crackles in bases.  Normal respiratory effort.   Cardiovascular:  RRR. S1, S2 without MRG.  PV: Pulses palpable. No edema.   Abdomen: Soft, non-tender, non-distended. +BS  Musculoskeletal: Right AKA otherwise no other obvious deformities.   Skin: Vascular discoloration to left lower extremity, left foot wound with dressing dry and intact  Neurologic:  Grossly non-focal. Awake, alert, following commands.   Psychiatric: Alert and oriented, thought content appropriate, normal insight and judgement    Medications:  Reviewed    Infusion Medications    sodium chloride      dextrose       Scheduled Medications    magnesium sulfate  4,000 mg IntraVENous Once    metoprolol tartrate  50 mg Oral BID    white petrolatum   Topical BID    bumetanide  2 mg Oral BID    amLODIPine   10 mg Oral Daily    ferric gluconate (FERRLECIT) 125 mg in sodium chloride 0.9 % 100 mL IVPB  125 mg IntraVENous Daily    heparin (porcine)  5,000 Units SubCUTAneous Q8H    guaiFENesin  400 mg Oral TID    sodium chloride flush  5-40 mL IntraVENous 2 times per day    ipratropium 0.5 mg-albuterol 2.5 mg  1 Dose Inhalation Q4H WA RT    insulin glargine  30 Units SubCUTAneous Nightly    insulin lispro  0-8 Units SubCUTAneous TID WC    insulin lispro  0-4 Units SubCUTAneous Nightly     PRN Meds: white petrolatum **AND** white petrolatum, hydrALAZINE, labetalol, sodium chloride flush, sodium chloride, ondansetron **OR** ondansetron, polyethylene glycol, acetaminophen **OR** acetaminophen, aluminum & magnesium hydroxide-simethicone, guaiFENesin-dextromethorphan, glucose, dextrose bolus **OR** dextrose bolus, glucagon (rDNA), dextrose, perflutren lipid microspheres    I/O    Intake/Output Summary (Last 24 hours) at 8/1/2024 0956  Last data filed at 8/1/2024 0535  Gross per 24 hour   Intake 280 ml   Output 2700 ml   Net -2420 ml         Labs:   Recent Labs     07/30/24  0454 07/31/24  0500 08/01/24  0500   WBC 13.5* 11.1 12.4*   HGB 10.1* 10.7* 11.7*   HCT 33.0* 35.4* 37.0    261 260         Recent Labs     07/30/24  1140 07/31/24  0500 08/01/24  0500    144 139   K 3.9 3.7 3.5    96* 90*   CO2 33* 31* 35*   BUN 53* 48* 48*   CREATININE 2.1* 1.9* 2.1*   CALCIUM 9.3 9.7 9.2         No results for input(s): \"ALKPHOS\", \"ALT\", \"AST\", \"BILITOT\", \"AMYLASE\", \"LIPASE\" in the last 72 hours.    Invalid input(s): \"PROT\", \"ALB\"      Recent Labs     07/31/24  0500   INR 1.3         No results for input(s): \"CKTOTAL\", \"TROPONINI\" in the last 72 hours.    Chronic labs:  Lab Results   Component Value Date    CHOL 129 09/30/2023    TRIG 108 09/30/2023    HDL 36 (L) 09/30/2023    TSH 1.410 08/18/2022    PSA 0.64 08/18/2022    INR 1.3 07/31/2024    LABA1C 5.6 06/06/2024       Radiology:  Imaging studies reviewed

## 2024-08-01 NOTE — PLAN OF CARE
Problem: Chronic Conditions and Co-morbidities  Goal: Patient's chronic conditions and co-morbidity symptoms are monitored and maintained or improved  Outcome: Progressing     Problem: Discharge Planning  Goal: Discharge to home or other facility with appropriate resources  Outcome: Progressing     Problem: Safety - Adult  Goal: Free from fall injury  Outcome: Progressing     Problem: ABCDS Injury Assessment  Goal: Absence of physical injury  Outcome: Progressing     Problem: Skin/Tissue Integrity  Goal: Absence of new skin breakdown  Description: 1.  Monitor for areas of redness and/or skin breakdown  2.  Assess vascular access sites hourly  3.  Every 4-6 hours minimum:  Change oxygen saturation probe site  4.  Every 4-6 hours:  If on nasal continuous positive airway pressure, respiratory therapy assess nares and determine need for appliance change or resting period.  Outcome: Progressing     Problem: Nutrition Deficit:  Goal: Optimize nutritional status  Outcome: Progressing

## 2024-08-01 NOTE — PROGRESS NOTES
Physical Therapy  Initial Assessment     Name: Florentin Stark  : 1955  MRN: [id number]      Date of Service: 2024    Evaluating PT: Satinder Olivas, PT, DPT [id number]      Room #:  6422/6422-A  Diagnosis:  Shortness of breath [R06.02]  Wheezing [R06.2]  Pneumonia due to organism [J18.9]  Elevated troponin [R79.89]  Elevated d-dimer [R79.89]  Acute on chronic respiratory failure with hypoxia (HCC) [J96.21]  Acute kidney injury superimposed on chronic kidney disease (HCC) [N17.9, N18.9]  Pneumonia due to infectious organism, unspecified laterality, unspecified part of lung [J18.9]  Congestive heart failure, unspecified HF chronicity, unspecified heart failure type (HCC) [I50.9]  PMHx/PSHx:   has a past medical history of 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), Acute kidney injury (HCC), AKA stump complication (HCC), 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 (HCC), Atherosclerosis of native artery of left leg with ulceration of ankle (HCC), 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), Cellulitis, scrotum, Coagulopathy (HCC), Critical lower limb ischemia (HCC), Diabetes mellitus (HCC), Diabetic foot infection (HCC), 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, with necrosis of bone (HCC), 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 2 diabetes mellitus, with fat layer exposed (HCC), Disruption of closure of muscle or muscle flap, sequela, DVT, lower extremity (HCC), Encounter for dental examination and cleaning with abnormal findings, Gas gangrene of thigh (HCC), History of DVT (deep vein thrombosis), Hyperglycemia due to type 2 diabetes mellitus (HCC), Hyperlipidemia, Hypertension, Ischemic ulcer of right thigh with fat layer exposed (HCC), Ischemic ulcer of right thigh with necrosis of muscle (HCC), 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HCC), Moderate protein-calorie malnutrition (HCC), Occlusion of common

## 2024-08-01 NOTE — PROGRESS NOTES
Comprehensive Nutrition Assessment    Type and Reason for Visit:  Initial, Wound, Consult    Nutrition Recommendations/Plan:   Recommend and start Glucerna supplement BID and Antoni wound healing supplement BID to help meet increased nutritional needs from wound healing.           Malnutrition Assessment:  Malnutrition Status:  At risk for malnutrition (Comment) (08/01/24 0915)    Context:  Acute Illness     Findings of the 6 clinical characteristics of malnutrition:  Energy Intake:  Mild decrease in energy intake (Comment) (since admission)  Weight Loss:  Unable to assess (d/t lack of actual weight history)     Body Fat Loss:  Unable to assess     Muscle Mass Loss:  Unable to assess    Fluid Accumulation:  No significant fluid accumulation     Strength:  Not Performed    Nutrition Assessment:    Patient adm w/ SOB and cough ; COLE on CKD ; noted acute on chronic respiratory failure with hypoxia ; noted PNA and pleural effusion ; s/p thoracentesis on 7/31 (700ml removed) ; hx of osteomyelitis and R AKA ; hx of DM/CKD/DVT/PVD/CHF ; wound noted ; s/p debridement on 5/8/24 ; noted pulmonary edema/volume overload ; will provide recommendations    Nutrition Related Findings:    -I&Os (-13.3 L), 2+ edema, obesity, active BS, A&O x 4 ; Wound Type: Open Wounds, Stage III, Skin Tears (wound x 1)       Current Nutrition Intake & Therapies:    Average Meal Intake: 51-75% (limited meals recorded in flowsheets)     ADULT DIET; Regular    Anthropometric Measures:  Height: 188 cm (6' 2\")  Ideal Body Weight (IBW): 190 lbs (86 kg)    Admission Body Weight: 131.5 kg (290 lb) (7/31, bedscale)  Current Body Weight: 131.5 kg (290 lb) (8/1, bedscale), 152.6 % IBW. Weight Source: Bed Scale  Current BMI (kg/m2): 37.2  Usual Body Weight:  (UTO ; EMR shows past weight of 287# no method on 7/11/24)                       BMI Categories: Obese Class 2 (BMI 35.0 -39.9)    Estimated Daily Nutrient Needs:  Energy Requirements Based On:

## 2024-08-01 NOTE — CARE COORDINATION
8/1:  Update CM Note:  Pt presented to the ER for SOB from home.  Pt is a possible dc today.  Pt's dc plan is home & his family will bring his power chair/02 from home- they will transport.  Pt is active with [facility] & [facility] placed.  Sw/NOEMY will continue to follow.  Electronically signed by Cristine Zambrano RN on 8/1/2024 at 3:35 PM

## 2024-08-02 ENCOUNTER — APPOINTMENT (OUTPATIENT)
Dept: GENERAL RADIOLOGY | Age: 69
End: 2024-08-02
Payer: MEDICARE

## 2024-08-02 VITALS
SYSTOLIC BLOOD PRESSURE: 142 MMHG | TEMPERATURE: 97.8 F | DIASTOLIC BLOOD PRESSURE: 78 MMHG | HEIGHT: 74 IN | WEIGHT: 288.2 LBS | OXYGEN SATURATION: 99 % | BODY MASS INDEX: 36.99 KG/M2 | HEART RATE: 83 BPM | RESPIRATION RATE: 15 BRPM

## 2024-08-02 DIAGNOSIS — G89.18 POSTOPERATIVE PAIN: ICD-10-CM

## 2024-08-02 DIAGNOSIS — G89.4 CHRONIC PAIN SYNDROME: ICD-10-CM

## 2024-08-02 PROBLEM — J96.21 ACUTE ON CHRONIC RESPIRATORY FAILURE WITH HYPOXIA (HCC): Status: ACTIVE | Noted: 2024-08-02

## 2024-08-02 LAB
ANION GAP SERPL CALCULATED.3IONS-SCNC: 11 MMOL/L (ref 7–16)
BASOPHILS # BLD: 0.04 K/UL (ref 0–0.2)
BASOPHILS NFR BLD: 0 % (ref 0–2)
BUN SERPL-MCNC: 52 MG/DL (ref 6–23)
CALCIUM SERPL-MCNC: 9.3 MG/DL (ref 8.6–10.2)
CHLORIDE SERPL-SCNC: 89 MMOL/L (ref 98–107)
CO2 SERPL-SCNC: 36 MMOL/L (ref 22–29)
CREAT SERPL-MCNC: 2.2 MG/DL (ref 0.7–1.2)
EOSINOPHIL # BLD: 0.23 K/UL (ref 0.05–0.5)
EOSINOPHILS RELATIVE PERCENT: 2 % (ref 0–6)
ERYTHROCYTE [DISTWIDTH] IN BLOOD BY AUTOMATED COUNT: 15.9 % (ref 11.5–15)
GFR, ESTIMATED: 32 ML/MIN/1.73M2
GLUCOSE BLD-MCNC: 218 MG/DL (ref 74–99)
GLUCOSE SERPL-MCNC: 123 MG/DL (ref 74–99)
HCT VFR BLD AUTO: 37.4 % (ref 37–54)
HGB BLD-MCNC: 11.6 G/DL (ref 12.5–16.5)
IMM GRANULOCYTES # BLD AUTO: 0.08 K/UL (ref 0–0.58)
IMM GRANULOCYTES NFR BLD: 1 % (ref 0–5)
LYMPHOCYTES NFR BLD: 1.84 K/UL (ref 1.5–4)
LYMPHOCYTES RELATIVE PERCENT: 15 % (ref 20–42)
MAGNESIUM SERPL-MCNC: 2.2 MG/DL (ref 1.6–2.6)
MCH RBC QN AUTO: 24.2 PG (ref 26–35)
MCHC RBC AUTO-ENTMCNC: 31 G/DL (ref 32–34.5)
MCV RBC AUTO: 78.1 FL (ref 80–99.9)
MICROORGANISM SPEC CULT: NO GROWTH
MICROORGANISM SPEC CULT: NORMAL
MICROORGANISM SPEC CULT: NORMAL
MICROORGANISM/AGENT SPEC: NORMAL
MONOCYTES NFR BLD: 1.07 K/UL (ref 0.1–0.95)
MONOCYTES NFR BLD: 9 % (ref 2–12)
NEUTROPHILS NFR BLD: 74 % (ref 43–80)
NEUTS SEG NFR BLD: 9.28 K/UL (ref 1.8–7.3)
NON-GYN CYTOLOGY REPORT: NORMAL
PARTIAL THROMBOPLASTIN TIME: 32.6 SEC (ref 24.5–35.1)
PLATELET # BLD AUTO: 261 K/UL (ref 130–450)
PMV BLD AUTO: 11.1 FL (ref 7–12)
POTASSIUM SERPL-SCNC: 3.4 MMOL/L (ref 3.5–5)
RBC # BLD AUTO: 4.79 M/UL (ref 3.8–5.8)
REPORT STATUS: NORMAL
SERVICE CMNT-IMP: NORMAL
SODIUM SERPL-SCNC: 136 MMOL/L (ref 132–146)
SPECIMEN DESCRIPTION: NORMAL
WBC OTHER # BLD: 12.5 K/UL (ref 4.5–11.5)

## 2024-08-02 PROCEDURE — 99239 HOSP IP/OBS DSCHRG MGMT >30: CPT | Performed by: INTERNAL MEDICINE

## 2024-08-02 PROCEDURE — 36415 COLL VENOUS BLD VENIPUNCTURE: CPT

## 2024-08-02 PROCEDURE — 83735 ASSAY OF MAGNESIUM: CPT

## 2024-08-02 PROCEDURE — 6370000000 HC RX 637 (ALT 250 FOR IP): Performed by: INTERNAL MEDICINE

## 2024-08-02 PROCEDURE — 85730 THROMBOPLASTIN TIME PARTIAL: CPT

## 2024-08-02 PROCEDURE — 71045 X-RAY EXAM CHEST 1 VIEW: CPT

## 2024-08-02 PROCEDURE — 85025 COMPLETE CBC W/AUTO DIFF WBC: CPT

## 2024-08-02 PROCEDURE — 2580000003 HC RX 258

## 2024-08-02 PROCEDURE — 80048 BASIC METABOLIC PNL TOTAL CA: CPT

## 2024-08-02 PROCEDURE — 94640 AIRWAY INHALATION TREATMENT: CPT

## 2024-08-02 PROCEDURE — 94660 CPAP INITIATION&MGMT: CPT

## 2024-08-02 PROCEDURE — 6370000000 HC RX 637 (ALT 250 FOR IP)

## 2024-08-02 PROCEDURE — 2700000000 HC OXYGEN THERAPY PER DAY

## 2024-08-02 PROCEDURE — 6360000002 HC RX W HCPCS

## 2024-08-02 PROCEDURE — 6360000002 HC RX W HCPCS: Performed by: INTERNAL MEDICINE

## 2024-08-02 PROCEDURE — 82962 GLUCOSE BLOOD TEST: CPT

## 2024-08-02 RX ORDER — METOPROLOL TARTRATE 75 MG/1
75 TABLET, FILM COATED ORAL 2 TIMES DAILY
Qty: 60 TABLET | Refills: 3 | Status: SHIPPED | OUTPATIENT
Start: 2024-08-02

## 2024-08-02 RX ORDER — INSULIN LISPRO 100 [IU]/ML
0-8 INJECTION, SOLUTION INTRAVENOUS; SUBCUTANEOUS
Qty: 10 ML | Refills: 0 | Status: SHIPPED | OUTPATIENT
Start: 2024-08-02

## 2024-08-02 RX ORDER — BUMETANIDE 2 MG/1
2 TABLET ORAL 2 TIMES DAILY
Qty: 30 TABLET | Refills: 3 | Status: SHIPPED | OUTPATIENT
Start: 2024-08-02

## 2024-08-02 RX ORDER — INSULIN LISPRO 100 [IU]/ML
0-4 INJECTION, SOLUTION INTRAVENOUS; SUBCUTANEOUS NIGHTLY
Qty: 10 ML | Refills: 0 | Status: SHIPPED | OUTPATIENT
Start: 2024-08-02 | End: 2025-04-09

## 2024-08-02 RX ORDER — OXYCODONE HCL 10 MG/1
10 TABLET, FILM COATED, EXTENDED RELEASE ORAL 2 TIMES DAILY
Qty: 60 TABLET | Refills: 0 | Status: SHIPPED | OUTPATIENT
Start: 2024-08-02 | End: 2024-09-01

## 2024-08-02 RX ORDER — INSULIN GLARGINE 100 [IU]/ML
30 INJECTION, SOLUTION SUBCUTANEOUS NIGHTLY
Qty: 10 ML | Refills: 3 | Status: SHIPPED | OUTPATIENT
Start: 2024-08-02

## 2024-08-02 RX ORDER — OXYCODONE AND ACETAMINOPHEN 7.5; 325 MG/1; MG/1
1 TABLET ORAL EVERY 6 HOURS PRN
Qty: 120 TABLET | Refills: 0 | Status: SHIPPED | OUTPATIENT
Start: 2024-08-02 | End: 2024-09-01

## 2024-08-02 RX ADMIN — GUAIFENESIN 400 MG: 400 TABLET ORAL at 14:41

## 2024-08-02 RX ADMIN — HEPARIN SODIUM 5000 UNITS: 5000 INJECTION INTRAVENOUS; SUBCUTANEOUS at 06:09

## 2024-08-02 RX ADMIN — SODIUM CHLORIDE, PRESERVATIVE FREE 10 ML: 5 INJECTION INTRAVENOUS at 09:01

## 2024-08-02 RX ADMIN — AMLODIPINE BESYLATE 10 MG: 10 TABLET ORAL at 09:00

## 2024-08-02 RX ADMIN — BUMETANIDE 2 MG: 1 TABLET ORAL at 08:59

## 2024-08-02 RX ADMIN — METOPROLOL TARTRATE 50 MG: 50 TABLET, FILM COATED ORAL at 09:00

## 2024-08-02 RX ADMIN — INSULIN LISPRO 2 UNITS: 100 INJECTION, SOLUTION INTRAVENOUS; SUBCUTANEOUS at 12:46

## 2024-08-02 RX ADMIN — PETROLATUM: 420 OINTMENT TOPICAL at 09:01

## 2024-08-02 RX ADMIN — GUAIFENESIN 400 MG: 400 TABLET ORAL at 09:00

## 2024-08-02 RX ADMIN — IPRATROPIUM BROMIDE AND ALBUTEROL SULFATE 1 DOSE: 2.5; .5 SOLUTION RESPIRATORY (INHALATION) at 12:33

## 2024-08-02 RX ADMIN — SODIUM CHLORIDE 125 MG: 9 INJECTION, SOLUTION INTRAVENOUS at 10:11

## 2024-08-02 RX ADMIN — HYDRALAZINE HYDROCHLORIDE 10 MG: 20 INJECTION INTRAMUSCULAR; INTRAVENOUS at 01:40

## 2024-08-02 RX ADMIN — HEPARIN SODIUM 5000 UNITS: 5000 INJECTION INTRAVENOUS; SUBCUTANEOUS at 14:41

## 2024-08-02 RX ADMIN — IPRATROPIUM BROMIDE AND ALBUTEROL SULFATE 1 DOSE: 2.5; .5 SOLUTION RESPIRATORY (INHALATION) at 09:01

## 2024-08-02 NOTE — PLAN OF CARE
Problem: Chronic Conditions and Co-morbidities  Goal: Patient's chronic conditions and co-morbidity symptoms are monitored and maintained or improved  8/2/2024 1111 by Peggy Pa RN  Outcome: Progressing  8/1/2024 2152 by Javier Simon RN  Outcome: Progressing     Problem: Discharge Planning  Goal: Discharge to home or other facility with appropriate resources  8/2/2024 1111 by Peggy Pa RN  Outcome: Progressing  8/1/2024 2152 by Javier Simon RN  Outcome: Progressing     Problem: Safety - Adult  Goal: Free from fall injury  8/2/2024 1111 by Peggy Pa RN  Outcome: Progressing  8/1/2024 2152 by Javier Simon RN  Outcome: Progressing     Problem: ABCDS Injury Assessment  Goal: Absence of physical injury  8/1/2024 2152 by Javier Simon RN  Outcome: Progressing     Problem: Skin/Tissue Integrity  Goal: Absence of new skin breakdown  Description: 1.  Monitor for areas of redness and/or skin breakdown  2.  Assess vascular access sites hourly  3.  Every 4-6 hours minimum:  Change oxygen saturation probe site  4.  Every 4-6 hours:  If on nasal continuous positive airway pressure, respiratory therapy assess nares and determine need for appliance change or resting period.  8/1/2024 2152 by Javier Simon RN  Outcome: Progressing     Problem: Nutrition Deficit:  Goal: Optimize nutritional status  8/1/2024 2152 by Javier Simon RN  Outcome: Progressing     Problem: Chronic Conditions and Co-morbidities  Goal: Patient's chronic conditions and co-morbidity symptoms are monitored and maintained or improved  8/2/2024 1111 by Peggy Pa RN  Outcome: Progressing  8/1/2024 2152 by Javier Simon RN  Outcome: Progressing     Problem: Discharge Planning  Goal: Discharge to home or other facility with appropriate resources  8/2/2024 1111 by Peggy Pa RN  Outcome: Progressing  8/1/2024 2152 by Javier Simon RN  Outcome: Progressing     Problem: Safety - Adult  Goal: Free from fall  injury  8/2/2024 1111 by Peggy Pa, RN  Outcome: Progressing  8/1/2024 2152 by Javier Simon, RN  Outcome: Progressing     Problem: ABCDS Injury Assessment  Goal: Absence of physical injury  8/1/2024 2152 by Javier Simon, RN  Outcome: Progressing     Problem: Skin/Tissue Integrity  Goal: Absence of new skin breakdown  Description: 1.  Monitor for areas of redness and/or skin breakdown  2.  Assess vascular access sites hourly  3.  Every 4-6 hours minimum:  Change oxygen saturation probe site  4.  Every 4-6 hours:  If on nasal continuous positive airway pressure, respiratory therapy assess nares and determine need for appliance change or resting period.  8/1/2024 2152 by Javier Simon, RN  Outcome: Progressing     Problem: Nutrition Deficit:  Goal: Optimize nutritional status  8/1/2024 2152 by Javier Simon, RN  Outcome: Progressing

## 2024-08-02 NOTE — PROGRESS NOTES
CLINICAL PHARMACY NOTE: MEDS TO BEDS    Total # of Prescriptions Filled: 4   The following medications were delivered to the patient:  Metoprolol tart 25mg  Insulin lispro 100u/ml  Trueplus 31g x 6mm needles  Bumex 2mg    Additional Documentation:  Cristine  picked up in pharmacy 8-2-24

## 2024-08-02 NOTE — PROGRESS NOTES
Department of Internal Medicine  Nephrology Attending Consult Note    Events reviewed.    SUBJECTIVE: We are following Florentin Stark for COLE. Patient reports feeling better.         Vitals:    VITALS:  BP (!) 152/72   Pulse 82   Temp 98 °F (36.7 °C) (Oral)   Resp 16   Ht 1.88 m (6' 2\")   Wt 130.7 kg (288 lb 3.2 oz)   SpO2 98%   BMI 37.00 kg/m²   24HR INTAKE/OUTPUT:    Intake/Output Summary (Last 24 hours) at 8/2/2024 0910  Last data filed at 8/2/2024 0705  Gross per 24 hour   Intake --   Output 1600 ml   Net -1600 ml       Scheduled Meds:   metoprolol tartrate  50 mg Oral BID    white petrolatum   Topical BID    bumetanide  2 mg Oral BID    amLODIPine  10 mg Oral Daily    ferric gluconate (FERRLECIT) 125 mg in sodium chloride 0.9 % 100 mL IVPB  125 mg IntraVENous Daily    heparin (porcine)  5,000 Units SubCUTAneous Q8H    guaiFENesin  400 mg Oral TID    sodium chloride flush  5-40 mL IntraVENous 2 times per day    ipratropium 0.5 mg-albuterol 2.5 mg  1 Dose Inhalation Q4H WA RT    insulin glargine  30 Units SubCUTAneous Nightly    insulin lispro  0-8 Units SubCUTAneous TID WC    insulin lispro  0-4 Units SubCUTAneous Nightly     Continuous Infusions:   sodium chloride      dextrose       PRN Meds:.white petrolatum **AND** white petrolatum, hydrALAZINE, labetalol, sodium chloride flush, sodium chloride, ondansetron **OR** ondansetron, polyethylene glycol, acetaminophen **OR** acetaminophen, aluminum & magnesium hydroxide-simethicone, guaiFENesin-dextromethorphan, glucose, dextrose bolus **OR** dextrose bolus, glucagon (rDNA), dextrose, perflutren lipid microspheres     Constitutional: Patient is on BiPAP, lethargic  HEENT: Pupils are equal reactive  Respiratory: Decreased breath sounds bilaterally  Cardiovascular/Edema: Heart sounds are regular  Gastrointestinal: Abdomen soft  Neurologic: Lethargic  Skin: No lesions  Other: Trace edema    DATA:    CBC:   Lab Results   Component Value Date/Time    WBC 12.5

## 2024-08-02 NOTE — CARE COORDINATION
8/2:  Update CM Note:  Pt presented to the ER for SOB from home.  Pt's dc plan is home & he is now requesting transportation home.  Cm set up transportation with [facility] at [phone] Reference #[id number].  They will call the floor & advise when they can pick him up.  They will have a wc & 02 for the pt.  CM picked up pt's medications.  Sw/NOEMY will continue to follow.  Electronically signed by Cristine Zambrano RN on 8/2/2024 at 3:10 PM

## 2024-08-02 NOTE — PROGRESS NOTES
Date: 8/1/2024    Time: 10:19 PM    Patient Placed On BIPAP/CPAP/ Non-Invasive Ventilation?  Yes    If no must comment.  Facial area red/color change? No           If YES are Blister/Lesion present?No   If yes must notify nursing staff  BIPAP/CPAP skin barrier?  Yes    Skin barrier type:mepilexlite       Comments:        Denis Chery RCP

## 2024-08-02 NOTE — PROGRESS NOTES
Venkata Espinoza M.D.,[facility]  Jesse Jarrell D.O., F.ELIZABETH., [facility]  Miles Camarillo M.D.  Nancy Hogan M.D.   Phil Erwin D.O.  Agustín Evans M.D.         Daily Pulmonary Progress Note    Patient:  Florentin Stark 68 y.o. male MRN: [id number]            Synopsis     We are following patient for pneumonia    \"CC\" cough, SOB    Code status: FULL CODE      Subjective      Patient was seen and examined resting in bed on 3 liters NC.  Reports feeling well and being near his baseline.      Review of Systems:  Constitutional: Denies fever, weight loss, night sweats, and fatigue  Skin: Denies pigmentation, dark lesions, and rashes   HEENT: Denies hearing loss, tinnitus, ear drainage, epistaxis, sore throat, and hoarseness.  Cardiovascular: Denies palpitations, chest pain, and chest pressure.  Respiratory: cough, chest tightness, shortness of breath  Gastrointestinal: Denies nausea, vomiting, poor appetite, diarrhea, heartburn or reflux  Genitourinary: Denies dysuria, frequency, urgency or hematuria  Musculoskeletal: Denies myalgias, muscle weakness, and bone pain  Neurological: Denies dizziness, vertigo, headache, and focal weakness  Psychological: Denies anxiety and depression  Endocrine: Denies heat intolerance and cold intolerance  Hematopoietic/Lymphatic: Denies bleeding problems and blood transfusions    24-hour events:  Thoracentesis     Objective   OBJECTIVE:   BP (!) 142/78   Pulse 83   Temp 97.8 °F (36.6 °C) (Oral)   Resp 15   Ht 1.88 m (6' 2\")   Wt 130.7 kg (288 lb 3.2 oz)   SpO2 99%   BMI 37.00 kg/m²   SpO2 Readings from Last 1 Encounters:   08/02/24 99%        I/O:    Intake/Output Summary (Last 24 hours) at 8/2/2024 1708  Last data filed at 8/2/2024 1458  Gross per 24 hour   Intake --   Output 2200 ml   Net -2200 ml             IPAP: 14 cmH20  CPAP/EPAP: 10 cmH2O     CURRENT MEDS :  Scheduled Meds:   metoprolol tartrate  75 mg Oral BID    white petrolatum   Topical BID    bumetanide  2 mg

## 2024-08-05 ENCOUNTER — TELEPHONE (OUTPATIENT)
Dept: ADMINISTRATIVE | Age: 69
End: 2024-08-05

## 2024-08-06 ENCOUNTER — TELEPHONE (OUTPATIENT)
Dept: PRIMARY CARE CLINIC | Age: 69
End: 2024-08-06

## 2024-08-06 NOTE — TELEPHONE ENCOUNTER
Care Transitions Initial Follow Up Call    Outreach made within 2 business days of discharge: Yes    Patient: Florentin Stark Patient : 1955   MRN: [id number]  Reason for Admission: Pneumonia due to organism.   Discharge Date: 24       Spoke with: Patient    Discharge department/facility: [facility] Interactive Patient Contact:  Was patient able to fill all prescriptions: Yes  Was patient instructed to bring all medications to the follow-up visit: Yes  Is patient taking all medications as directed in the discharge summary? Yes  Does patient understand their discharge instructions: Yes  Does patient have questions or concerns that need addressed prior to 7-14 day follow up office visit: no    Additional needs identified to be addressed with provider  No needs identified             Scheduled appointment with PCP within 7-14 days    Follow Up  Future Appointments   Date Time Provider Department Center   2024  1:00 PM Juanito Kline DO [facility] DEP       Cristel Minor MA

## 2024-08-06 NOTE — TELEPHONE ENCOUNTER
Care Transitions Initial Follow Up Call    Outreach made within 2 business days of discharge: Yes    Patient: Floerntin Stark Patient : 1955   MRN: [id number]  Reason for Admission: Respiratory issues  Discharge Date: 24       Spoke with: ZEHRA for patient to contact office regarding hospital follow up.    Discharge department/facility: [facility]        Scheduled appointment with PCP within 7-14 days    Follow Up  Future Appointments   Date Time Provider Department Center   2024  1:00 PM Juanito Kline DO [facility] DEP       Vy Livingston MA

## 2024-08-07 ENCOUNTER — HOSPITAL ENCOUNTER (EMERGENCY)
Age: 69
Discharge: HOME | End: 2024-08-07
Attending: EMERGENCY MEDICINE
Payer: MEDICARE

## 2024-08-07 ENCOUNTER — APPOINTMENT (OUTPATIENT)
Dept: GENERAL RADIOLOGY | Age: 69
End: 2024-08-07
Payer: MEDICARE

## 2024-08-07 VITALS
RESPIRATION RATE: 18 BRPM | SYSTOLIC BLOOD PRESSURE: 116 MMHG | TEMPERATURE: 98 F | BODY MASS INDEX: 36.83 KG/M2 | HEART RATE: 77 BPM | WEIGHT: 287 LBS | DIASTOLIC BLOOD PRESSURE: 60 MMHG | HEIGHT: 74 IN | OXYGEN SATURATION: 97 %

## 2024-08-07 DIAGNOSIS — J96.10 CHRONIC RESPIRATORY FAILURE, UNSPECIFIED WHETHER WITH HYPOXIA OR HYPERCAPNIA (HCC): ICD-10-CM

## 2024-08-07 DIAGNOSIS — R06.02 SHORTNESS OF BREATH: Primary | ICD-10-CM

## 2024-08-07 LAB
ANION GAP SERPL CALCULATED.3IONS-SCNC: 9 MMOL/L (ref 7–16)
BASOPHILS # BLD: 0.06 K/UL (ref 0–0.2)
BASOPHILS NFR BLD: 1 % (ref 0–2)
BNP SERPL-MCNC: 1227 PG/ML (ref 0–125)
BUN SERPL-MCNC: 55 MG/DL (ref 6–23)
CALCIUM SERPL-MCNC: 8.7 MG/DL (ref 8.6–10.2)
CHLORIDE SERPL-SCNC: 97 MMOL/L (ref 98–107)
CO2 SERPL-SCNC: 34 MMOL/L (ref 22–29)
CREAT SERPL-MCNC: 2 MG/DL (ref 0.7–1.2)
EOSINOPHIL # BLD: 0.48 K/UL (ref 0.05–0.5)
EOSINOPHILS RELATIVE PERCENT: 4 % (ref 0–6)
ERYTHROCYTE [DISTWIDTH] IN BLOOD BY AUTOMATED COUNT: 16.6 % (ref 11.5–15)
GFR, ESTIMATED: 36 ML/MIN/1.73M2
GLUCOSE SERPL-MCNC: 200 MG/DL (ref 74–99)
HCT VFR BLD AUTO: 37.9 % (ref 37–54)
HGB BLD-MCNC: 11.3 G/DL (ref 12.5–16.5)
IMM GRANULOCYTES # BLD AUTO: 0.08 K/UL (ref 0–0.58)
IMM GRANULOCYTES NFR BLD: 1 % (ref 0–5)
LYMPHOCYTES NFR BLD: 1.56 K/UL (ref 1.5–4)
LYMPHOCYTES RELATIVE PERCENT: 13 % (ref 20–42)
MCH RBC QN AUTO: 23.8 PG (ref 26–35)
MCHC RBC AUTO-ENTMCNC: 29.8 G/DL (ref 32–34.5)
MCV RBC AUTO: 80 FL (ref 80–99.9)
MONOCYTES NFR BLD: 0.98 K/UL (ref 0.1–0.95)
MONOCYTES NFR BLD: 8 % (ref 2–12)
NEUTROPHILS NFR BLD: 74 % (ref 43–80)
NEUTS SEG NFR BLD: 9.04 K/UL (ref 1.8–7.3)
PLATELET # BLD AUTO: 276 K/UL (ref 130–450)
PMV BLD AUTO: 11.5 FL (ref 7–12)
POTASSIUM SERPL-SCNC: 3.6 MMOL/L (ref 3.5–5)
RBC # BLD AUTO: 4.74 M/UL (ref 3.8–5.8)
SODIUM SERPL-SCNC: 140 MMOL/L (ref 132–146)
TROPONIN I SERPL HS-MCNC: 182 NG/L (ref 0–11)
TROPONIN I SERPL HS-MCNC: 183 NG/L (ref 0–11)
WBC OTHER # BLD: 12.2 K/UL (ref 4.5–11.5)

## 2024-08-07 PROCEDURE — 80048 BASIC METABOLIC PNL TOTAL CA: CPT

## 2024-08-07 PROCEDURE — 71045 X-RAY EXAM CHEST 1 VIEW: CPT

## 2024-08-07 PROCEDURE — 85025 COMPLETE CBC W/AUTO DIFF WBC: CPT

## 2024-08-07 PROCEDURE — 84484 ASSAY OF TROPONIN QUANT: CPT

## 2024-08-07 PROCEDURE — 99285 EMERGENCY DEPT VISIT HI MDM: CPT

## 2024-08-07 PROCEDURE — 83880 ASSAY OF NATRIURETIC PEPTIDE: CPT

## 2024-08-07 PROCEDURE — 93005 ELECTROCARDIOGRAM TRACING: CPT | Performed by: EMERGENCY MEDICINE

## 2024-08-07 ASSESSMENT — ENCOUNTER SYMPTOMS
SHORTNESS OF BREATH: 1
EYE PAIN: 0
COUGH: 0
EYE DISCHARGE: 0
WHEEZING: 0
EYE REDNESS: 0
VOMITING: 0
NAUSEA: 0
DIARRHEA: 0
SINUS PRESSURE: 0
SWOLLEN GLANDS: 0
SORE THROAT: 0
ABDOMINAL PAIN: 0
BACK PAIN: 0

## 2024-08-07 NOTE — ED PROVIDER NOTES
Eosinophils Absolute 0.48 0.05 - 0.50 k/uL    Basophils Absolute 0.06 0.00 - 0.20 k/uL    Immature Granulocytes Absolute 0.08 0.00 - 0.58 k/uL   Basic Metabolic Panel   Result Value Ref Range    Sodium 140 132 - 146 mmol/L    Potassium 3.6 3.5 - 5.0 mmol/L    Chloride 97 (L) 98 - 107 mmol/L    CO2 34 (H) 22 - 29 mmol/L    Anion Gap 9 7 - 16 mmol/L    Glucose 200 (H) 74 - 99 mg/dL    BUN 55 (H) 6 - 23 mg/dL    Creatinine 2.0 (H) 0.70 - 1.20 mg/dL    Est, Glom Filt Rate 36 (L) >60 mL/min/1.73m2    Calcium 8.7 8.6 - 10.2 mg/dL   Troponin   Result Value Ref Range    Troponin, High Sensitivity 183 (H) 0 - 11 ng/L   Brain Natriuretic Peptide   Result Value Ref Range    Pro-BNP 1,227 (H) 0 - 125 pg/mL   Troponin   Result Value Ref Range    Troponin, High Sensitivity 182 (H) 0 - 11 ng/L   EKG 12 Lead   Result Value Ref Range    Ventricular Rate 87 BPM    Atrial Rate 87 BPM    P-R Interval 276 ms    QRS Duration 98 ms    Q-T Interval 452 ms    QTc Calculation (Bazett) 543 ms    R Axis 6 degrees    T Axis 46 degrees       RADIOLOGY:  XR CHEST PORTABLE    Result Date: 8/2/2024  EXAMINATION: ONE XRAY VIEW OF THE CHEST 8/2/2024 8:17 am COMPARISON: 07/31/2022 HISTORY: ORDERING SYSTEM PROVIDED HISTORY: resp failure TECHNOLOGIST PROVIDED HISTORY: Reason for exam:->resp failure FINDINGS: The lungs are without acute focal process.  There is no effusion or pneumothorax. The cardiomediastinal silhouette is without acute process. The osseous structures are without acute process.     No acute process.     IR GUIDED THORACENTESIS PLEURAL    Result Date: 8/1/2024  PROCEDURE: ULTRASOUNDGUIDED RIGHT THORACENTESIS 7/31/2024 HISTORY: ORDERING SYSTEM PROVIDED HISTORY: IR guided right thoracentesis TECHNOLOGIST PROVIDED HISTORY: Reason for exam:->IR guided right thoracentesis Which side should the procedure be performed?->Right What reading provider will be dictating this exam?->CRC TECHNIQUE: The radiology physician assistant, Len Decker,

## 2024-08-08 LAB
EKG ATRIAL RATE: 87 BPM
EKG P-R INTERVAL: 276 MS
EKG Q-T INTERVAL: 452 MS
EKG QRS DURATION: 98 MS
EKG QTC CALCULATION (BAZETT): 543 MS
EKG R AXIS: 6 DEGREES
EKG T AXIS: 46 DEGREES
EKG VENTRICULAR RATE: 87 BPM

## 2024-08-08 PROCEDURE — 93010 ELECTROCARDIOGRAM REPORT: CPT | Performed by: INTERNAL MEDICINE

## 2024-08-08 NOTE — DISCHARGE INSTRUCTIONS
Visit Discharge/Physician Orders     Discharge condition: Stable     Assessment of pain at discharge: yes     Anesthetic used: 4% lidocaine      Discharge to: Home     Left via:Private automobile     Accompanied by: family     ECF/HHA: [facility]      Dressing Orders: LEFT LATERAL ANKLE: Cleanse wound with normal saline. COVER WITH DRAWTEX. APPLY Profore Change M-W (in [facility])-F.       IF WRAP FALLS 2 INCHES, OR IF PAIN INCREASES AND BECOMES INTOLERABLE,  REMOVE WRAP AND APPLY DOUBLE TUBIGRIP.  CALL WOUND CARE CENTER.       Treatment Orders: Eat a diet high in protein and vitamin C. Take a multiple vitamin daily unless contraindicated.      Elevate leg as much as possible.     Follow up with Dr. Brooks for foot care     STOP SMOKING!      [facility] followup visit : 2 week _______________________  (Please note your next appointment above and if you are unable to keep, kindly give a 24 hour notice. Thank you.)     Physician signature:__________________________      If you experience any of the following, please call the Wound Care Center during business hours:     * Increase in Pain  * Temperature over 101  * Increase in drainage from your wound  * Drainage with a foul odor  * Bleeding  * Increase in swelling  * Need for compression bandage changes due to slippage, breakthrough drainage.     If you need medical attention outside of the business hours of the Wound Care Centers please contact your PCP or go to the nearest emergency room.

## 2024-08-12 DIAGNOSIS — G89.4 CHRONIC PAIN SYNDROME: ICD-10-CM

## 2024-08-12 DIAGNOSIS — E11.69 CONTROLLED TYPE 2 DIABETES MELLITUS WITH OTHER SPECIFIED COMPLICATION, WITH LONG-TERM CURRENT USE OF INSULIN (HCC): ICD-10-CM

## 2024-08-12 DIAGNOSIS — Z89.611 S/P AKA (ABOVE KNEE AMPUTATION), RIGHT (HCC): ICD-10-CM

## 2024-08-12 DIAGNOSIS — I73.9 PVD (PERIPHERAL VASCULAR DISEASE) (HCC): ICD-10-CM

## 2024-08-12 DIAGNOSIS — Z79.4 CONTROLLED TYPE 2 DIABETES MELLITUS WITH OTHER SPECIFIED COMPLICATION, WITH LONG-TERM CURRENT USE OF INSULIN (HCC): ICD-10-CM

## 2024-08-12 NOTE — PROGRESS NOTES
Physician Progress Note      PATIENT:               IZA MARTINI  [facility] #:                  [id number]  :                       1955  ADMIT DATE:       2024 2:02 PM  DISCH DATE:        2024 5:57 PM  RESPONDING  PROVIDER #:        Mary Landers MD          QUERY TEXT:    Pt admitted with shortness of breath and diagnosed with acute on chronic   respiratory failure, and has CHF documented. If possible, please document in   progress notes and discharge summary further specificity regarding the type   and acuity of CHF:      The medical record reflects the following:  Risk Factors: CKD, DM, HTN  Clinical Indicators: H&P \"...Acute hypoxic respiratory failure likely   secondary to community-acquired pneumonia versus pulmonary embolism versus CHF   exacerbation...\", Progress Note  \"...Will hold off on IV fluids for now   in the setting of possible CHF exacerbation...\", Pulmonology Progress Note    \"...Chest ct reviewed - bilateral pleural effusions R>L, atelectasis...\",   Nephrology Progress Note  \"...COLE on CKD, probably hemodynamically   mediated due to decompensated heart failure?  versus intravascular volume   depletion...\", Echocardiogram  \"...Left Ventricle: No  Treatment: IV & PO Bumex, Labs, I&O, Daily weights      Thank you,  JOSEF Mcclain, RN, CRCR  Clinical Documentation Integrity  [phone]  Options provided:  -- Acute on Chronic Systolic CHF/HFrEF  -- Acute on Chronic Diastolic CHF/HFpEF  -- Acute on Chronic Systolic and Diastolic CHF  -- Chronic Systolic CHF/HFrEF  -- Chronic Diastolic CHF/HFpEF  -- Chronic Systolic and Diastolic CHF  -- Other - I will add my own diagnosis  -- Disagree - Not applicable / Not valid  -- Disagree - Clinically unable to determine / Unknown  -- Refer to Clinical Documentation Reviewer    PROVIDER RESPONSE TEXT:    This patient is in acute on chronic diastolic CHF/HFpEF.    Query created by: Jasbir Hernandez on 2024 12:28

## 2024-08-13 DIAGNOSIS — E11.65 UNCONTROLLED TYPE 2 DIABETES MELLITUS WITH HYPERGLYCEMIA (HCC): ICD-10-CM

## 2024-08-13 RX ORDER — DULOXETIN HYDROCHLORIDE 60 MG/1
CAPSULE, DELAYED RELEASE ORAL
Qty: 60 CAPSULE | Refills: 10 | Status: SHIPPED | OUTPATIENT
Start: 2024-08-13

## 2024-08-13 RX ORDER — GABAPENTIN 800 MG/1
TABLET ORAL
Qty: 120 TABLET | Refills: 10 | Status: SHIPPED | OUTPATIENT
Start: 2024-08-13 | End: 2025-08-13

## 2024-08-13 NOTE — PROGRESS NOTES
Physician Progress Note      PATIENT:               IZA MARTINI  CSN #:                  [id number]  :                       1955  ADMIT DATE:       2024 2:02 PM  DISCH DATE:        2024 5:57 PM  RESPONDING  PROVIDER #:        Mary Landers MD          QUERY TEXT:    Patient admitted with cough and shortness of breath. Documentation reflects   Pneumonia in H&P and Progress Notes. Noted documentation of \"No evidence of   pneumonia, stop antibiotics\" per Pulmonology Note .  If possible, please   document in the progress notes and discharge summary if Pneumonia was:    The medical record reflects the following:  Risk Factors: RAGHU, Obesity, Tobacco Use  Clinical Indicators: H&P \"...Acute hypoxic respiratory failure likely   secondary to community-acquired pneumonia versus pulmonary embolism versus CHF   exacerbation...Reviewed imaging, chest x-ray with bilateral pulmonary   opacities concerning for pneumonia...\",  Pulmonology Consult Note    \"...Acute on chronic hypoxic respiratory failure with bilateral infiltrates.    Suspect bilateral pneumonia.  Possible pulmonary edema/volume   overload...Bilateral pneumonia - CAP...\", Pulmonology Progress Note    \"...No evidence of pneumonia, stop antibiotics...\", Progress Notes - \".  Treatment: IV Abx, IV Abx discontinued on , Labs, Pulmonology Consult,   Chest x-ray, CT Chest      Thank you,  Jasbir Hernandez, BSN, RN, CRCR  Clinical Documentation Integrity  [phone]  Options provided:  -- Pneumonia  ruled out after study  -- Pneumonia, POA, treated and resolved  -- Other - I will add my own diagnosis  -- Disagree - Not applicable / Not valid  -- Disagree - Clinically unable to determine / Unknown  -- Refer to Clinical Documentation Reviewer    PROVIDER RESPONSE TEXT:    Pneumonia ruled out after study.    Query created by: Jasbir Hernandez on 2024 2:49 PM      QUERY TEXT:    Patient was documented to have SOB and was

## 2024-08-14 ENCOUNTER — HOSPITAL ENCOUNTER (OUTPATIENT)
Dept: WOUND CARE | Age: 69
Discharge: HOME | End: 2024-08-14
Attending: SURGERY
Payer: MEDICARE

## 2024-08-14 ENCOUNTER — TELEPHONE (OUTPATIENT)
Dept: ADMINISTRATIVE | Age: 69
End: 2024-08-14

## 2024-08-14 ENCOUNTER — OFFICE VISIT (OUTPATIENT)
Dept: PRIMARY CARE CLINIC | Age: 69
End: 2024-08-14

## 2024-08-14 VITALS
RESPIRATION RATE: 18 BRPM | BODY MASS INDEX: 36.83 KG/M2 | WEIGHT: 287 LBS | DIASTOLIC BLOOD PRESSURE: 64 MMHG | HEART RATE: 78 BPM | TEMPERATURE: 98.5 F | HEIGHT: 74 IN | SYSTOLIC BLOOD PRESSURE: 158 MMHG

## 2024-08-14 VITALS
HEART RATE: 84 BPM | OXYGEN SATURATION: 92 % | BODY MASS INDEX: 36.83 KG/M2 | TEMPERATURE: 98.6 F | SYSTOLIC BLOOD PRESSURE: 132 MMHG | HEIGHT: 74 IN | WEIGHT: 287 LBS | DIASTOLIC BLOOD PRESSURE: 84 MMHG

## 2024-08-14 DIAGNOSIS — Z09 HOSPITAL DISCHARGE FOLLOW-UP: ICD-10-CM

## 2024-08-14 DIAGNOSIS — M86.462 CHRONIC OSTEOMYELITIS OF LEFT FIBULA WITH DRAINING SINUS (HCC): Primary | ICD-10-CM

## 2024-08-14 DIAGNOSIS — E11.622 DIABETIC ULCER OF LEFT CALF ASSOCIATED WITH TYPE 2 DIABETES MELLITUS, WITH NECROSIS OF BONE (HCC): Chronic | ICD-10-CM

## 2024-08-14 DIAGNOSIS — N18.32 STAGE 3B CHRONIC KIDNEY DISEASE (HCC): ICD-10-CM

## 2024-08-14 DIAGNOSIS — I70.243 ATHEROSCLEROSIS OF NATIVE ARTERY OF LEFT LEG WITH ULCERATION OF ANKLE (HCC): ICD-10-CM

## 2024-08-14 DIAGNOSIS — Z79.4 CONTROLLED TYPE 2 DIABETES MELLITUS WITH OTHER SPECIFIED COMPLICATION, WITH LONG-TERM CURRENT USE OF INSULIN (HCC): ICD-10-CM

## 2024-08-14 DIAGNOSIS — L97.224 DIABETIC ULCER OF LEFT CALF ASSOCIATED WITH TYPE 2 DIABETES MELLITUS, WITH NECROSIS OF BONE (HCC): ICD-10-CM

## 2024-08-14 DIAGNOSIS — I10 HYPERTENSION, UNSPECIFIED TYPE: ICD-10-CM

## 2024-08-14 DIAGNOSIS — J90 BILATERAL PLEURAL EFFUSION: Primary | ICD-10-CM

## 2024-08-14 DIAGNOSIS — E11.69 CONTROLLED TYPE 2 DIABETES MELLITUS WITH OTHER SPECIFIED COMPLICATION, WITH LONG-TERM CURRENT USE OF INSULIN (HCC): ICD-10-CM

## 2024-08-14 DIAGNOSIS — L97.224 DIABETIC ULCER OF LEFT CALF ASSOCIATED WITH TYPE 2 DIABETES MELLITUS, WITH NECROSIS OF BONE (HCC): Chronic | ICD-10-CM

## 2024-08-14 DIAGNOSIS — J96.21 ACUTE ON CHRONIC RESPIRATORY FAILURE WITH HYPOXIA (HCC): ICD-10-CM

## 2024-08-14 DIAGNOSIS — E11.622 DIABETIC ULCER OF LEFT CALF ASSOCIATED WITH TYPE 2 DIABETES MELLITUS, WITH NECROSIS OF BONE (HCC): ICD-10-CM

## 2024-08-14 DIAGNOSIS — I50.30 HEART FAILURE WITH PRESERVED EJECTION FRACTION, UNSPECIFIED HF CHRONICITY (HCC): ICD-10-CM

## 2024-08-14 PROCEDURE — 11042 DBRDMT SUBQ TIS 1ST 20SQCM/<: CPT

## 2024-08-14 PROCEDURE — 11042 DBRDMT SUBQ TIS 1ST 20SQCM/<: CPT | Performed by: SURGERY

## 2024-08-14 RX ORDER — BACITRACIN ZINC AND POLYMYXIN B SULFATE 500; 1000 [USP'U]/G; [USP'U]/G
OINTMENT TOPICAL ONCE
OUTPATIENT
Start: 2024-08-14 | End: 2024-08-14

## 2024-08-14 RX ORDER — IBUPROFEN 200 MG
TABLET ORAL ONCE
OUTPATIENT
Start: 2024-08-14 | End: 2024-08-14

## 2024-08-14 RX ORDER — SODIUM CHLOR/HYPOCHLOROUS ACID 0.033 %
SOLUTION, IRRIGATION IRRIGATION ONCE
OUTPATIENT
Start: 2024-08-14 | End: 2024-08-14

## 2024-08-14 RX ORDER — INSULIN GLARGINE 100 [IU]/ML
INJECTION, SOLUTION SUBCUTANEOUS
Qty: 15 ML | Refills: 10 | OUTPATIENT
Start: 2024-08-14

## 2024-08-14 RX ORDER — LIDOCAINE HYDROCHLORIDE 20 MG/ML
JELLY TOPICAL ONCE
OUTPATIENT
Start: 2024-08-14 | End: 2024-08-14

## 2024-08-14 RX ORDER — CLOBETASOL PROPIONATE 0.5 MG/G
OINTMENT TOPICAL ONCE
OUTPATIENT
Start: 2024-08-14 | End: 2024-08-14

## 2024-08-14 RX ORDER — BLOOD-GLUCOSE SENSOR
EACH MISCELLANEOUS
Qty: 4 EACH | Refills: 5 | Status: SHIPPED | OUTPATIENT
Start: 2024-08-14

## 2024-08-14 RX ORDER — FLASH GLUCOSE SENSOR
1 KIT MISCELLANEOUS
Qty: 3 EACH | Refills: 5 | Status: SHIPPED | OUTPATIENT
Start: 2024-08-14

## 2024-08-14 RX ORDER — LIDOCAINE HYDROCHLORIDE 40 MG/ML
SOLUTION TOPICAL ONCE
Status: COMPLETED | OUTPATIENT
Start: 2024-08-14 | End: 2024-08-14

## 2024-08-14 RX ORDER — LIDOCAINE 50 MG/G
OINTMENT TOPICAL ONCE
OUTPATIENT
Start: 2024-08-14 | End: 2024-08-14

## 2024-08-14 RX ORDER — FLASH GLUCOSE SENSOR
1 KIT MISCELLANEOUS 4 TIMES DAILY
Qty: 4 EACH | Refills: 5 | Status: SHIPPED | OUTPATIENT
Start: 2024-08-14

## 2024-08-14 RX ORDER — LIDOCAINE HYDROCHLORIDE 40 MG/ML
SOLUTION TOPICAL ONCE
OUTPATIENT
Start: 2024-08-14 | End: 2024-08-14

## 2024-08-14 RX ORDER — BACITRACIN ZINC 500 [USP'U]/G
OINTMENT TOPICAL ONCE
OUTPATIENT
Start: 2024-08-14 | End: 2024-08-14

## 2024-08-14 RX ORDER — GENTAMICIN SULFATE 1 MG/G
OINTMENT TOPICAL ONCE
OUTPATIENT
Start: 2024-08-14 | End: 2024-08-14

## 2024-08-14 RX ORDER — TRIAMCINOLONE ACETONIDE 1 MG/G
OINTMENT TOPICAL ONCE
OUTPATIENT
Start: 2024-08-14 | End: 2024-08-14

## 2024-08-14 RX ORDER — NYSTATIN 100000 U/G
OINTMENT TOPICAL
Qty: 30 G | Refills: 5 | Status: SHIPPED
Start: 2024-08-14 | End: 2024-08-15 | Stop reason: SDUPTHER

## 2024-08-14 RX ORDER — LIDOCAINE 40 MG/G
CREAM TOPICAL ONCE
OUTPATIENT
Start: 2024-08-14 | End: 2024-08-14

## 2024-08-14 RX ORDER — BETAMETHASONE DIPROPIONATE 0.5 MG/G
CREAM TOPICAL ONCE
OUTPATIENT
Start: 2024-08-14 | End: 2024-08-14

## 2024-08-14 RX ADMIN — LIDOCAINE HYDROCHLORIDE 10 ML: 40 SOLUTION TOPICAL at 11:15

## 2024-08-14 NOTE — PLAN OF CARE
Problem: Cognitive:  Goal: Knowledge of wound care  Description: Knowledge of wound care  Outcome: Progressing  Goal: Understands risk factors for wounds  Description: Understands risk factors for wounds  Outcome: Progressing     Problem: Wound:  Goal: Will show signs of wound healing; wound closure and no evidence of infection  Description: Will show signs of wound healing; wound closure and no evidence of infection  Outcome: Progressing     Problem: Blood Glucose:  Goal: Ability to maintain appropriate glucose levels will improve  Description: Ability to maintain appropriate glucose levels will improve  Outcome: Progressing     Problem: Compression therapy:  Goal: Will be free from complications associated with compression therapy  Description: Will be free from complications associated with compression therapy  Outcome: Progressing

## 2024-08-14 NOTE — PROGRESS NOTES
Post-Discharge Transitional Care  Follow Up      Florentin Stark   YOB: 1955    Date of Office Visit:  8/14/2024  Date of Hospital Admission: 8/7/24  Date of Hospital Discharge: 8/7/24  Risk of hospital readmission (high >=14%. Medium >=10%) :Readmission Risk Score: 19.4      Care management risk score Rising risk (score 2-5) and Complex Care (Scores >=6): No Risk Score On File     Non face to face  following discharge, date last encounter closed (first attempt may have been earlier): 08/06/2024    Call initiated 2 business days of discharge: Yes    ASSESSMENT/PLAN:   Bilateral pleural effusion  -     AFL (Epic) - Phil Erwin DO, Pulmonology, [place]  -     DME Order for Home Oxygen as OP  Hospital discharge follow-up  -     SD DISCHARGE MEDS RECONCILED W/ CURRENT OUTPATIENT MED LIST  Heart failure with preserved ejection fraction, unspecified HF chronicity (HCC)  -     [place] - Akil Hartman MD, Cardiology, [place]  Stage 3b chronic kidney disease (HCC)  -     AFL - Carroll Reynolds MD, Nephrology, [place]  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, with necrosis of bone (HCC)  Controlled type 2 diabetes mellitus with other specified complication, with long-term current use of insulin (HCC)  -     Continuous Glucose Sensor (FREESTYLE JANESSA 3 SENSOR) MISC; Use as directed, Disp-4 each, R-5Normal  -     Continuous Glucose Sensor (FREESTYLE JANESSA 14 DAY SENSOR) MISC; 1 Device by Does not apply route every 14 days, Disp-3 each, R-5Normal  -     Continuous Glucose  (FREESTYLE JANESSA READER) LILA; 1 Device by Does not apply route 4 times daily, Disp-4 each, R-5Normal  Acute on chronic respiratory failure with hypoxia (HCC)  -     DME Order for Home Oxygen as OP  Hypertension, unspecified type      Medical Decision Making: moderate complexity  Return in about 3 months (around 11/14/2024).    On this date 8/14/2024 I have spent 20 minutes reviewing previous notes, test

## 2024-08-14 NOTE — TELEPHONE ENCOUNTER
Pt has referrel  DX     - Heart failure with preserved ejection fraction, unspecified HF chronicity (HCC)   LOV 7/11/24 Devan  please advise pt

## 2024-08-14 NOTE — PROGRESS NOTES
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, with necrosis of bone (HCC) (Chronic)    Relevant Orders    Initiate Outpatient Wound Care Protocol    Osteomyelitis of left fibula (HCC) - Primary    Relevant Orders    Initiate Outpatient Wound Care Protocol       Pre Debridement Measurements:  Are located in the Wound/Ulcer Documentation Flow Sheet  Post Debridement Measurements:  Wound/Ulcer Descriptions are Pre Debridement except measurements:     Wound 11/15/23 Ankle Left;Lateral #1 (Active)   Wound Image   08/14/24 1105   Wound Etiology Pressure Stage 3 07/31/24 1004   Dressing Status New dressing applied 08/14/24 1201   Wound Cleansed Cleansed with saline 08/14/24 1201   Dressing/Treatment ABD 08/14/24 1201   Offloading for Diabetic Foot Ulcers Diabetic shoes/inserts 08/14/24 1201   Wound Length (cm) 6.5 cm 08/14/24 1105   Wound Width (cm) 2.7 cm 08/14/24 1105   Wound Depth (cm) 0.3 cm 08/14/24 1105   Wound Surface Area (cm^2) 17.55 cm^2 08/14/24 1105   Change in Wound Size % (l*w) 76.81 08/14/24 1105   Wound Volume (cm^3) 5.265 cm^3 08/14/24 1105   Wound Healing % 95 08/14/24 1105   Post-Procedure Length (cm) 6.5 cm 08/14/24 1143   Post-Procedure Width (cm) 2.8 cm 08/14/24 1143   Post-Procedure Depth (cm) 0.4 cm 08/14/24 1143   Post-Procedure Surface Area (cm^2) 18.2 cm^2 08/14/24 1143   Post-Procedure Volume (cm^3) 7.28 cm^3 08/14/24 1143   Wound Assessment Granulation tissue;Pink/red;Fibrin 08/14/24

## 2024-08-15 RX ORDER — NYSTATIN 100000 U/G
OINTMENT TOPICAL
Qty: 30 G | Refills: 5 | Status: SHIPPED | OUTPATIENT
Start: 2024-08-15

## 2024-08-26 NOTE — DISCHARGE INSTRUCTIONS
Visit Discharge/Physician Orders     Discharge condition: Stable     Assessment of pain at discharge: yes     Anesthetic used: 4% lidocaine      Discharge to: Home     Left via:Private automobile     Accompanied by: family     ECF/HHA: [facility]      Dressing Orders: LEFT LATERAL ANKLE: Cleanse wound with normal saline. COVER WITH DRAWTEX. APPLY Profore Change M-W (in [facility])-F.       IF WRAP FALLS 2 INCHES, OR IF PAIN INCREASES AND BECOMES INTOLERABLE,  REMOVE WRAP AND APPLY DOUBLE TUBIGRIP.  CALL WOUND CARE CENTER.       Treatment Orders: Eat a diet high in protein and vitamin C. Take a multiple vitamin daily unless contraindicated.      Elevate leg as much as possible.     Follow up with Dr. Brooks for foot care     STOP SMOKING!      [facility] followup visit : 2 week _______________________  (Please note your next appointment above and if you are unable to keep, kindly give a 24 hour notice. Thank you.)     Physician signature:__________________________      If you experience any of the following, please call the Wound Care Center during business hours:     * Increase in Pain  * Temperature over 101  * Increase in drainage from your wound  * Drainage with a foul odor  * Bleeding  * Increase in swelling  * Need for compression bandage changes due to slippage, breakthrough drainage.     If you need medical attention outside of the business hours of the Wound Care Centers please contact your PCP or go to the nearest emergency room.

## 2024-08-28 ENCOUNTER — HOSPITAL ENCOUNTER (OUTPATIENT)
Dept: WOUND CARE | Age: 69
Discharge: HOME | End: 2024-08-28
Attending: SURGERY

## 2024-08-28 ENCOUNTER — HOSPITAL ENCOUNTER (INPATIENT)
Age: 69
LOS: 12 days | Discharge: HOME HEALTH | DRG: 193 | End: 2024-09-09
Attending: EMERGENCY MEDICINE | Admitting: INTERNAL MEDICINE
Payer: MEDICARE

## 2024-08-28 ENCOUNTER — APPOINTMENT (OUTPATIENT)
Dept: GENERAL RADIOLOGY | Age: 69
DRG: 193 | End: 2024-08-28
Payer: MEDICARE

## 2024-08-28 ENCOUNTER — APPOINTMENT (OUTPATIENT)
Dept: CT IMAGING | Age: 69
DRG: 193 | End: 2024-08-28
Payer: MEDICARE

## 2024-08-28 DIAGNOSIS — I50.9 ACUTE ON CHRONIC CONGESTIVE HEART FAILURE, UNSPECIFIED HEART FAILURE TYPE (HCC): ICD-10-CM

## 2024-08-28 DIAGNOSIS — G89.18 POSTOPERATIVE PAIN: ICD-10-CM

## 2024-08-28 DIAGNOSIS — J44.1 COPD EXACERBATION (HCC): ICD-10-CM

## 2024-08-28 DIAGNOSIS — I73.9 PERIPHERAL VASCULAR DISEASE, UNSPECIFIED (HCC): ICD-10-CM

## 2024-08-28 DIAGNOSIS — J18.9 PNEUMONIA OF RIGHT MIDDLE LOBE DUE TO INFECTIOUS ORGANISM: ICD-10-CM

## 2024-08-28 DIAGNOSIS — G89.4 CHRONIC PAIN SYNDROME: ICD-10-CM

## 2024-08-28 DIAGNOSIS — I10 HYPERTENSION, ESSENTIAL: ICD-10-CM

## 2024-08-28 DIAGNOSIS — J90 PLEURAL EFFUSION, BILATERAL: ICD-10-CM

## 2024-08-28 DIAGNOSIS — R91.1 PULMONARY NODULE: ICD-10-CM

## 2024-08-28 DIAGNOSIS — I16.0 HYPERTENSIVE URGENCY: ICD-10-CM

## 2024-08-28 DIAGNOSIS — J96.22 ACUTE ON CHRONIC RESPIRATORY FAILURE WITH HYPOXIA AND HYPERCAPNIA (HCC): Primary | ICD-10-CM

## 2024-08-28 DIAGNOSIS — R78.81 BACTEREMIA: ICD-10-CM

## 2024-08-28 DIAGNOSIS — J96.21 ACUTE ON CHRONIC RESPIRATORY FAILURE WITH HYPOXIA AND HYPERCAPNIA (HCC): Primary | ICD-10-CM

## 2024-08-28 PROBLEM — J96.01 ACUTE HYPOXEMIC RESPIRATORY FAILURE (HCC): Status: ACTIVE | Noted: 2024-08-28

## 2024-08-28 LAB
AADO2: 269.9 MMHG
AADO2: 285.1 MMHG
AADO2: 342.7 MMHG
AADO2: 377.2 MMHG
AADO2: 381.5 MMHG
AADO2: 415.2 MMHG
ALBUMIN SERPL-MCNC: 4 G/DL (ref 3.5–5.2)
ALP SERPL-CCNC: 128 U/L (ref 40–129)
ALT SERPL-CCNC: 11 U/L (ref 0–40)
ANION GAP SERPL CALCULATED.3IONS-SCNC: 10 MMOL/L (ref 7–16)
ANION GAP SERPL CALCULATED.3IONS-SCNC: 10 MMOL/L (ref 7–16)
AST SERPL-CCNC: 14 U/L (ref 0–39)
B PARAP IS1001 DNA NPH QL NAA+NON-PROBE: NOT DETECTED
B PERT DNA SPEC QL NAA+PROBE: NOT DETECTED
B.E.: -0.1 MMOL/L (ref -3–3)
B.E.: -0.3 MMOL/L (ref -3–3)
B.E.: -1.5 MMOL/L (ref -3–3)
B.E.: -3 MMOL/L (ref -3–3)
B.E.: -3.3 MMOL/L (ref -3–3)
B.E.: 0 MMOL/L (ref -3–3)
B.E.: 0.2 MMOL/L (ref -3–3)
BACTERIA URNS QL MICRO: ABNORMAL
BASOPHILS # BLD: 0 K/UL (ref 0–0.2)
BASOPHILS # BLD: 0 K/UL (ref 0–0.2)
BASOPHILS NFR BLD: 0 % (ref 0–2)
BASOPHILS NFR BLD: 0 % (ref 0–2)
BILIRUB SERPL-MCNC: 0.4 MG/DL (ref 0–1.2)
BILIRUB UR QL STRIP: NEGATIVE
BNP SERPL-MCNC: 2849 PG/ML (ref 0–125)
BUN SERPL-MCNC: 36 MG/DL (ref 6–23)
BUN SERPL-MCNC: 36 MG/DL (ref 6–23)
C PNEUM DNA NPH QL NAA+NON-PROBE: NOT DETECTED
CALCIUM SERPL-MCNC: 9 MG/DL (ref 8.6–10.2)
CALCIUM SERPL-MCNC: 9.7 MG/DL (ref 8.6–10.2)
CHLORIDE SERPL-SCNC: 102 MMOL/L (ref 98–107)
CHLORIDE SERPL-SCNC: 104 MMOL/L (ref 98–107)
CLARITY UR: CLEAR
CO2 SERPL-SCNC: 26 MMOL/L (ref 22–29)
CO2 SERPL-SCNC: 29 MMOL/L (ref 22–29)
COHB: 0.5 % (ref 0–1.5)
COHB: 0.9 % (ref 0–1.5)
COHB: 1 % (ref 0–1.5)
COHB: 1.2 % (ref 0–1.5)
COLOR UR: YELLOW
COMMENT: ABNORMAL
COMMENT: ABNORMAL
CREAT SERPL-MCNC: 1.9 MG/DL (ref 0.7–1.2)
CREAT SERPL-MCNC: 1.9 MG/DL (ref 0.7–1.2)
CRITICAL: ABNORMAL
D-DIMER QUANTITATIVE: 1928 NG/ML DDU (ref 0–230)
DATE ANALYZED: ABNORMAL
DATE OF COLLECTION: ABNORMAL
EKG ATRIAL RATE: 100 BPM
EKG Q-T INTERVAL: 348 MS
EKG QRS DURATION: 96 MS
EKG QTC CALCULATION (BAZETT): 444 MS
EKG R AXIS: -12 DEGREES
EKG T AXIS: 69 DEGREES
EKG VENTRICULAR RATE: 98 BPM
EOSINOPHIL # BLD: 0 K/UL (ref 0.05–0.5)
EOSINOPHIL # BLD: 0.11 K/UL (ref 0.05–0.5)
EOSINOPHILS RELATIVE PERCENT: 0 % (ref 0–6)
EOSINOPHILS RELATIVE PERCENT: 1 % (ref 0–6)
EPI CELLS #/AREA URNS HPF: ABNORMAL /HPF
ERYTHROCYTE [DISTWIDTH] IN BLOOD BY AUTOMATED COUNT: 17.7 % (ref 11.5–15)
ERYTHROCYTE [DISTWIDTH] IN BLOOD BY AUTOMATED COUNT: 18.1 % (ref 11.5–15)
FIO2: 60 %
FIO2: 60 %
FIO2: 70 %
FIO2: 70 %
FIO2: 80 %
FIO2: 80 %
FLUAV RNA NPH QL NAA+NON-PROBE: NOT DETECTED
FLUBV RNA NPH QL NAA+NON-PROBE: NOT DETECTED
GFR, ESTIMATED: 38 ML/MIN/1.73M2
GFR, ESTIMATED: 40 ML/MIN/1.73M2
GLUCOSE BLD-MCNC: 180 MG/DL (ref 74–99)
GLUCOSE BLD-MCNC: 241 MG/DL (ref 74–99)
GLUCOSE BLD-MCNC: 281 MG/DL (ref 74–99)
GLUCOSE SERPL-MCNC: 113 MG/DL (ref 74–99)
GLUCOSE SERPL-MCNC: 171 MG/DL (ref 74–99)
GLUCOSE UR STRIP-MCNC: NEGATIVE MG/DL
HADV DNA NPH QL NAA+NON-PROBE: NOT DETECTED
HCO3: 23.5 MMOL/L (ref 22–26)
HCO3: 24.3 MMOL/L (ref 22–26)
HCO3: 25.5 MMOL/L (ref 22–26)
HCO3: 26.6 MMOL/L (ref 22–26)
HCO3: 26.8 MMOL/L (ref 22–26)
HCO3: 27 MMOL/L (ref 22–26)
HCO3: 28 MMOL/L (ref 22–26)
HCOV 229E RNA NPH QL NAA+NON-PROBE: NOT DETECTED
HCOV HKU1 RNA NPH QL NAA+NON-PROBE: NOT DETECTED
HCOV NL63 RNA NPH QL NAA+NON-PROBE: NOT DETECTED
HCOV OC43 RNA NPH QL NAA+NON-PROBE: NOT DETECTED
HCT VFR BLD AUTO: 28.8 % (ref 37–54)
HCT VFR BLD AUTO: 34.4 % (ref 37–54)
HGB BLD-MCNC: 8.7 G/DL (ref 12.5–16.5)
HGB BLD-MCNC: 9.7 G/DL (ref 12.5–16.5)
HGB UR QL STRIP.AUTO: NEGATIVE
HHB: 1.4 % (ref 0–5)
HHB: 19.6 % (ref 0–5)
HHB: 3.2 % (ref 0–5)
HHB: 5.3 % (ref 0–5)
HHB: 6.9 % (ref 0–5)
HHB: 7.2 % (ref 0–5)
HHB: 8.6 % (ref 0–5)
HMPV RNA NPH QL NAA+NON-PROBE: NOT DETECTED
HPIV1 RNA NPH QL NAA+NON-PROBE: NOT DETECTED
HPIV2 RNA NPH QL NAA+NON-PROBE: NOT DETECTED
HPIV3 RNA NPH QL NAA+NON-PROBE: NOT DETECTED
HPIV4 RNA NPH QL NAA+NON-PROBE: NOT DETECTED
IRON SATN MFR SERPL: 11 % (ref 20–55)
IRON SERPL-MCNC: 18 UG/DL (ref 59–158)
KETONES UR STRIP-MCNC: NEGATIVE MG/DL
LAB: ABNORMAL
LACTATE BLDV-SCNC: 1 MMOL/L (ref 0.5–2.2)
LACTATE BLDV-SCNC: 1.2 MMOL/L (ref 0.5–1.9)
LEUKOCYTE ESTERASE UR QL STRIP: NEGATIVE
LYMPHOCYTES NFR BLD: 0.32 K/UL (ref 1.5–4)
LYMPHOCYTES NFR BLD: 0.68 K/UL (ref 1.5–4)
LYMPHOCYTES RELATIVE PERCENT: 4 % (ref 20–42)
LYMPHOCYTES RELATIVE PERCENT: 5 % (ref 20–42)
Lab: 1038
Lab: 1149
Lab: 1330
Lab: 1330
Lab: 1437
Lab: 1840
Lab: 2108
M PNEUMO DNA NPH QL NAA+NON-PROBE: NOT DETECTED
MCH RBC QN AUTO: 23.3 PG (ref 26–35)
MCH RBC QN AUTO: 24.9 PG (ref 26–35)
MCHC RBC AUTO-ENTMCNC: 28.2 G/DL (ref 32–34.5)
MCHC RBC AUTO-ENTMCNC: 30.2 G/DL (ref 32–34.5)
MCV RBC AUTO: 82.5 FL (ref 80–99.9)
MCV RBC AUTO: 82.5 FL (ref 80–99.9)
METHB: 0.2 % (ref 0–1.5)
METHB: 0.3 % (ref 0–1.5)
METHB: 0.4 % (ref 0–1.5)
METHB: 0.5 % (ref 0–1.5)
METHB: 0.5 % (ref 0–1.5)
MODE: ABNORMAL
MONOCYTES NFR BLD: 0.08 K/UL (ref 0.1–0.95)
MONOCYTES NFR BLD: 0.57 K/UL (ref 0.1–0.95)
MONOCYTES NFR BLD: 1 % (ref 2–12)
MONOCYTES NFR BLD: 4 % (ref 2–12)
NEUTROPHILS NFR BLD: 90 % (ref 43–80)
NEUTROPHILS NFR BLD: 96 % (ref 43–80)
NEUTS SEG NFR BLD: 11.64 K/UL (ref 1.8–7.3)
NEUTS SEG NFR BLD: 8.7 K/UL (ref 1.8–7.3)
NITRITE UR QL STRIP: NEGATIVE
O2 SATURATION: 80.1 % (ref 92–98.5)
O2 SATURATION: 91.3 % (ref 92–98.5)
O2 SATURATION: 92.7 % (ref 92–98.5)
O2 SATURATION: 93 % (ref 92–98.5)
O2 SATURATION: 94.6 % (ref 92–98.5)
O2 SATURATION: 96.8 % (ref 92–98.5)
O2 SATURATION: 98.6 % (ref 92–98.5)
O2HB: 79.1 % (ref 94–97)
O2HB: 90.2 % (ref 94–97)
O2HB: 91.6 % (ref 94–97)
O2HB: 91.9 % (ref 94–97)
O2HB: 93.2 % (ref 94–97)
O2HB: 95.4 % (ref 94–97)
O2HB: 97.6 % (ref 94–97)
OPERATOR ID: 3214
OPERATOR ID: 366
OPERATOR ID: 7291
PATIENT TEMP: 37 C
PCO2: 50.5 MMHG (ref 35–45)
PCO2: 53.5 MMHG (ref 35–45)
PCO2: 54.1 MMHG (ref 35–45)
PCO2: 55.2 MMHG (ref 35–45)
PCO2: 55.9 MMHG (ref 35–45)
PCO2: 56.8 MMHG (ref 35–45)
PCO2: 62.2 MMHG (ref 35–45)
PEEP/CPAP: 8 CMH2O
PFO2: 0.67 MMHG/%
PFO2: 1.03 MMHG/%
PFO2: 1.1 MMHG/%
PFO2: 1.19 MMHG/%
PFO2: 1.37 MMHG/%
PFO2: 1.7 MMHG/%
PH BLOOD GAS: 7.27 (ref 7.35–7.45)
PH BLOOD GAS: 7.27 (ref 7.35–7.45)
PH BLOOD GAS: 7.28 (ref 7.35–7.45)
PH BLOOD GAS: 7.29 (ref 7.35–7.45)
PH BLOOD GAS: 7.29 (ref 7.35–7.45)
PH BLOOD GAS: 7.3 (ref 7.35–7.45)
PH BLOOD GAS: 7.31 (ref 7.35–7.45)
PH UR STRIP: 5.5 [PH] (ref 5–9)
PLATELET # BLD AUTO: 215 K/UL (ref 130–450)
PLATELET # BLD AUTO: 258 K/UL (ref 130–450)
PMV BLD AUTO: 10.9 FL (ref 7–12)
PMV BLD AUTO: 11.2 FL (ref 7–12)
PO2: 135.9 MMHG (ref 75–100)
PO2: 46.9 MMHG (ref 75–100)
PO2: 66.2 MMHG (ref 75–100)
PO2: 72 MMHG (ref 75–100)
PO2: 72.2 MMHG (ref 75–100)
PO2: 82.2 MMHG (ref 75–100)
PO2: 95.6 MMHG (ref 75–100)
POTASSIUM SERPL-SCNC: 5.1 MMOL/L (ref 3.5–5)
POTASSIUM SERPL-SCNC: 5.5 MMOL/L (ref 3.5–5)
PROCALCITONIN SERPL-MCNC: 0.21 NG/ML (ref 0–0.08)
PROT SERPL-MCNC: 9 G/DL (ref 6.4–8.3)
PROT UR STRIP-MCNC: 100 MG/DL
PS: 14 CMH20
PS: 16 CMH20
RBC # BLD AUTO: 3.49 M/UL (ref 3.8–5.8)
RBC # BLD AUTO: 4.17 M/UL (ref 3.8–5.8)
RBC # BLD: ABNORMAL 10*6/UL
RBC #/AREA URNS HPF: ABNORMAL /HPF
RI(T): 2.81
RI(T): 3.28
RI(T): 4.31
RI(T): 4.34
RI(T): 4.76
RI(T): 8.04
RR MECHANICAL: 18 B/MIN
RR MECHANICAL: 18 B/MIN
RR MECHANICAL: 22 B/MIN
RSV RNA NPH QL NAA+NON-PROBE: NOT DETECTED
RV+EV RNA NPH QL NAA+NON-PROBE: NOT DETECTED
SARS-COV-2 RNA NPH QL NAA+NON-PROBE: NOT DETECTED
SODIUM SERPL-SCNC: 138 MMOL/L (ref 132–146)
SODIUM SERPL-SCNC: 143 MMOL/L (ref 132–146)
SOURCE, BLOOD GAS: ABNORMAL
SP GR UR STRIP: 1.02 (ref 1–1.03)
SPECIMEN DESCRIPTION: NORMAL
THB: 10.3 G/DL (ref 11.5–16.5)
THB: 10.6 G/DL (ref 11.5–16.5)
THB: 10.7 G/DL (ref 11.5–16.5)
THB: 10.9 G/DL (ref 11.5–16.5)
THB: 9.2 G/DL (ref 11.5–16.5)
THB: 9.6 G/DL (ref 11.5–16.5)
THB: 9.8 G/DL (ref 11.5–16.5)
TIBC SERPL-MCNC: 159 UG/DL (ref 250–450)
TIME ANALYZED: 1043
TIME ANALYZED: 1158
TIME ANALYZED: 1338
TIME ANALYZED: 1340
TIME ANALYZED: 1450
TIME ANALYZED: 1842
TIME ANALYZED: 2114
TROPONIN I SERPL HS-MCNC: 120 NG/L (ref 0–11)
TROPONIN I SERPL HS-MCNC: 139 NG/L (ref 0–11)
UROBILINOGEN UR STRIP-ACNC: 0.2 EU/DL (ref 0–1)
VT MECHANICAL: 500 ML
WBC #/AREA URNS HPF: ABNORMAL /HPF
WBC OTHER # BLD: 13 K/UL (ref 4.5–11.5)
WBC OTHER # BLD: 9.1 K/UL (ref 4.5–11.5)

## 2024-08-28 PROCEDURE — 2580000003 HC RX 258: Performed by: INTERNAL MEDICINE

## 2024-08-28 PROCEDURE — 6360000002 HC RX W HCPCS

## 2024-08-28 PROCEDURE — 2500000003 HC RX 250 WO HCPCS

## 2024-08-28 PROCEDURE — 84155 ASSAY OF PROTEIN SERUM: CPT

## 2024-08-28 PROCEDURE — 87081 CULTURE SCREEN ONLY: CPT

## 2024-08-28 PROCEDURE — 83550 IRON BINDING TEST: CPT

## 2024-08-28 PROCEDURE — 87185 SC STD ENZYME DETCJ PER NZM: CPT

## 2024-08-28 PROCEDURE — 84145 PROCALCITONIN (PCT): CPT

## 2024-08-28 PROCEDURE — 82962 GLUCOSE BLOOD TEST: CPT

## 2024-08-28 PROCEDURE — 86738 MYCOPLASMA ANTIBODY: CPT

## 2024-08-28 PROCEDURE — 87077 CULTURE AEROBIC IDENTIFY: CPT

## 2024-08-28 PROCEDURE — 94660 CPAP INITIATION&MGMT: CPT

## 2024-08-28 PROCEDURE — 71045 X-RAY EXAM CHEST 1 VIEW: CPT

## 2024-08-28 PROCEDURE — 80053 COMPREHEN METABOLIC PANEL: CPT

## 2024-08-28 PROCEDURE — 2580000003 HC RX 258

## 2024-08-28 PROCEDURE — 87154 CUL TYP ID BLD PTHGN 6+ TRGT: CPT

## 2024-08-28 PROCEDURE — 87449 NOS EACH ORGANISM AG IA: CPT

## 2024-08-28 PROCEDURE — 80048 BASIC METABOLIC PNL TOTAL CA: CPT

## 2024-08-28 PROCEDURE — 81001 URINALYSIS AUTO W/SCOPE: CPT

## 2024-08-28 PROCEDURE — 87040 BLOOD CULTURE FOR BACTERIA: CPT

## 2024-08-28 PROCEDURE — 99285 EMERGENCY DEPT VISIT HI MDM: CPT

## 2024-08-28 PROCEDURE — 96374 THER/PROPH/DIAG INJ IV PUSH: CPT

## 2024-08-28 PROCEDURE — 2580000003 HC RX 258: Performed by: STUDENT IN AN ORGANIZED HEALTH CARE EDUCATION/TRAINING PROGRAM

## 2024-08-28 PROCEDURE — 83605 ASSAY OF LACTIC ACID: CPT

## 2024-08-28 PROCEDURE — 2000000000 HC ICU R&B

## 2024-08-28 PROCEDURE — 85379 FIBRIN DEGRADATION QUANT: CPT

## 2024-08-28 PROCEDURE — 6360000004 HC RX CONTRAST MEDICATION: Performed by: RADIOLOGY

## 2024-08-28 PROCEDURE — 84165 PROTEIN E-PHORESIS SERUM: CPT

## 2024-08-28 PROCEDURE — 6360000002 HC RX W HCPCS: Performed by: INTERNAL MEDICINE

## 2024-08-28 PROCEDURE — 0202U NFCT DS 22 TRGT SARS-COV-2: CPT

## 2024-08-28 PROCEDURE — 87899 AGENT NOS ASSAY W/OPTIC: CPT

## 2024-08-28 PROCEDURE — 96375 TX/PRO/DX INJ NEW DRUG ADDON: CPT

## 2024-08-28 PROCEDURE — 99221 1ST HOSP IP/OBS SF/LOW 40: CPT | Performed by: STUDENT IN AN ORGANIZED HEALTH CARE EDUCATION/TRAINING PROGRAM

## 2024-08-28 PROCEDURE — 83880 ASSAY OF NATRIURETIC PEPTIDE: CPT

## 2024-08-28 PROCEDURE — 82805 BLOOD GASES W/O2 SATURATION: CPT

## 2024-08-28 PROCEDURE — 93005 ELECTROCARDIOGRAM TRACING: CPT

## 2024-08-28 PROCEDURE — 99291 CRITICAL CARE FIRST HOUR: CPT | Performed by: INTERNAL MEDICINE

## 2024-08-28 PROCEDURE — 85025 COMPLETE CBC W/AUTO DIFF WBC: CPT

## 2024-08-28 PROCEDURE — 5A09358 ASSISTANCE WITH RESPIRATORY VENTILATION, LESS THAN 24 CONSECUTIVE HOURS, INTERMITTENT POSITIVE AIRWAY PRESSURE: ICD-10-PCS | Performed by: INTERNAL MEDICINE

## 2024-08-28 PROCEDURE — 5A09357 ASSISTANCE WITH RESPIRATORY VENTILATION, LESS THAN 24 CONSECUTIVE HOURS, CONTINUOUS POSITIVE AIRWAY PRESSURE: ICD-10-PCS | Performed by: INTERNAL MEDICINE

## 2024-08-28 PROCEDURE — 71275 CT ANGIOGRAPHY CHEST: CPT

## 2024-08-28 PROCEDURE — 93010 ELECTROCARDIOGRAM REPORT: CPT | Performed by: INTERNAL MEDICINE

## 2024-08-28 PROCEDURE — 94640 AIRWAY INHALATION TREATMENT: CPT

## 2024-08-28 PROCEDURE — 84484 ASSAY OF TROPONIN QUANT: CPT

## 2024-08-28 PROCEDURE — 83540 ASSAY OF IRON: CPT

## 2024-08-28 PROCEDURE — 6370000000 HC RX 637 (ALT 250 FOR IP)

## 2024-08-28 RX ORDER — SODIUM CHLORIDE 9 MG/ML
INJECTION, SOLUTION INTRAVENOUS PRN
Status: DISCONTINUED | OUTPATIENT
Start: 2024-08-28 | End: 2024-09-09 | Stop reason: HOSPADM

## 2024-08-28 RX ORDER — ACETAMINOPHEN 650 MG/1
650 SUPPOSITORY RECTAL EVERY 6 HOURS PRN
Status: DISCONTINUED | OUTPATIENT
Start: 2024-08-28 | End: 2024-08-28 | Stop reason: SDUPTHER

## 2024-08-28 RX ORDER — POTASSIUM CHLORIDE 29.8 MG/ML
20 INJECTION INTRAVENOUS PRN
Status: DISCONTINUED | OUTPATIENT
Start: 2024-08-28 | End: 2024-08-30

## 2024-08-28 RX ORDER — MAGNESIUM SULFATE IN WATER 40 MG/ML
2000 INJECTION, SOLUTION INTRAVENOUS PRN
Status: DISCONTINUED | OUTPATIENT
Start: 2024-08-28 | End: 2024-08-28

## 2024-08-28 RX ORDER — SODIUM CHLORIDE 0.9 % (FLUSH) 0.9 %
5-40 SYRINGE (ML) INJECTION PRN
Status: DISCONTINUED | OUTPATIENT
Start: 2024-08-28 | End: 2024-09-09 | Stop reason: HOSPADM

## 2024-08-28 RX ORDER — ONDANSETRON 4 MG/1
4 TABLET, ORALLY DISINTEGRATING ORAL EVERY 8 HOURS PRN
Status: DISCONTINUED | OUTPATIENT
Start: 2024-08-28 | End: 2024-08-28

## 2024-08-28 RX ORDER — ONDANSETRON 2 MG/ML
4 INJECTION INTRAMUSCULAR; INTRAVENOUS EVERY 6 HOURS PRN
Status: DISCONTINUED | OUTPATIENT
Start: 2024-08-28 | End: 2024-08-28

## 2024-08-28 RX ORDER — DEXTROSE MONOHYDRATE 25 G/50ML
25 INJECTION, SOLUTION INTRAVENOUS ONCE
Status: COMPLETED | OUTPATIENT
Start: 2024-08-28 | End: 2024-08-28

## 2024-08-28 RX ORDER — SODIUM CHLORIDE 0.9 % (FLUSH) 0.9 %
5-40 SYRINGE (ML) INJECTION PRN
Status: DISCONTINUED | OUTPATIENT
Start: 2024-08-28 | End: 2024-08-30

## 2024-08-28 RX ORDER — NITROGLYCERIN 20 MG/100ML
5-200 INJECTION INTRAVENOUS CONTINUOUS
Status: DISCONTINUED | OUTPATIENT
Start: 2024-08-28 | End: 2024-08-30

## 2024-08-28 RX ORDER — POLYETHYLENE GLYCOL 3350 17 G/17G
17 POWDER, FOR SOLUTION ORAL DAILY PRN
Status: DISCONTINUED | OUTPATIENT
Start: 2024-08-28 | End: 2024-08-28

## 2024-08-28 RX ORDER — CHLORTHALIDONE 25 MG/1
25 TABLET ORAL 2 TIMES DAILY
Status: ON HOLD | COMMUNITY
End: 2024-09-09 | Stop reason: HOSPADM

## 2024-08-28 RX ORDER — ACETAMINOPHEN 325 MG/1
650 TABLET ORAL EVERY 6 HOURS PRN
Status: DISCONTINUED | OUTPATIENT
Start: 2024-08-28 | End: 2024-08-28 | Stop reason: SDUPTHER

## 2024-08-28 RX ORDER — DOXYCYCLINE HYCLATE 100 MG
100 TABLET ORAL 2 TIMES DAILY
Status: ON HOLD | COMMUNITY
End: 2024-09-09 | Stop reason: HOSPADM

## 2024-08-28 RX ORDER — DEXTROSE MONOHYDRATE 25 G/50ML
25 INJECTION, SOLUTION INTRAVENOUS PRN
Status: DISCONTINUED | OUTPATIENT
Start: 2024-08-28 | End: 2024-09-09 | Stop reason: HOSPADM

## 2024-08-28 RX ORDER — SODIUM CHLORIDE 0.9 % (FLUSH) 0.9 %
5-40 SYRINGE (ML) INJECTION EVERY 12 HOURS SCHEDULED
Status: DISCONTINUED | OUTPATIENT
Start: 2024-08-28 | End: 2024-08-30

## 2024-08-28 RX ORDER — GLUCAGON 1 MG/ML
1 KIT INJECTION PRN
Status: DISCONTINUED | OUTPATIENT
Start: 2024-08-28 | End: 2024-09-09 | Stop reason: HOSPADM

## 2024-08-28 RX ORDER — IOPAMIDOL 755 MG/ML
70 INJECTION, SOLUTION INTRAVASCULAR
Status: COMPLETED | OUTPATIENT
Start: 2024-08-28 | End: 2024-08-28

## 2024-08-28 RX ORDER — ONDANSETRON 4 MG/1
4 TABLET, ORALLY DISINTEGRATING ORAL EVERY 8 HOURS PRN
Status: DISCONTINUED | OUTPATIENT
Start: 2024-08-28 | End: 2024-09-09 | Stop reason: HOSPADM

## 2024-08-28 RX ORDER — SODIUM CHLORIDE 0.9 % (FLUSH) 0.9 %
5-40 SYRINGE (ML) INJECTION EVERY 12 HOURS SCHEDULED
Status: DISCONTINUED | OUTPATIENT
Start: 2024-08-28 | End: 2024-09-09 | Stop reason: HOSPADM

## 2024-08-28 RX ORDER — BUDESONIDE 0.5 MG/2ML
0.5 INHALANT ORAL
Status: DISCONTINUED | OUTPATIENT
Start: 2024-08-28 | End: 2024-09-09 | Stop reason: HOSPADM

## 2024-08-28 RX ORDER — BUMETANIDE 0.25 MG/ML
2 INJECTION INTRAMUSCULAR; INTRAVENOUS ONCE
Status: COMPLETED | OUTPATIENT
Start: 2024-08-28 | End: 2024-08-28

## 2024-08-28 RX ORDER — CALCIUM GLUCONATE 10 MG/ML
1000 INJECTION, SOLUTION INTRAVENOUS ONCE
Status: COMPLETED | OUTPATIENT
Start: 2024-08-28 | End: 2024-08-28

## 2024-08-28 RX ORDER — SENNOSIDES A AND B 8.6 MG/1
1 TABLET, FILM COATED ORAL NIGHTLY
Status: DISCONTINUED | OUTPATIENT
Start: 2024-08-28 | End: 2024-09-09 | Stop reason: HOSPADM

## 2024-08-28 RX ORDER — ACETAMINOPHEN 325 MG/1
650 TABLET ORAL EVERY 6 HOURS PRN
Status: DISCONTINUED | OUTPATIENT
Start: 2024-08-28 | End: 2024-09-09 | Stop reason: HOSPADM

## 2024-08-28 RX ORDER — NITROGLYCERIN 20 MG/100ML
5-200 INJECTION INTRAVENOUS CONTINUOUS
Status: DISCONTINUED | OUTPATIENT
Start: 2024-08-28 | End: 2024-08-28

## 2024-08-28 RX ORDER — AMLODIPINE BESYLATE 10 MG/1
10 TABLET ORAL DAILY
COMMUNITY

## 2024-08-28 RX ORDER — HEPARIN SODIUM 5000 [USP'U]/ML
5000 INJECTION, SOLUTION INTRAVENOUS; SUBCUTANEOUS EVERY 8 HOURS
Status: DISCONTINUED | OUTPATIENT
Start: 2024-08-28 | End: 2024-09-09 | Stop reason: HOSPADM

## 2024-08-28 RX ORDER — IPRATROPIUM BROMIDE AND ALBUTEROL SULFATE 2.5; .5 MG/3ML; MG/3ML
1 SOLUTION RESPIRATORY (INHALATION)
Status: DISCONTINUED | OUTPATIENT
Start: 2024-08-29 | End: 2024-09-09 | Stop reason: HOSPADM

## 2024-08-28 RX ORDER — IPRATROPIUM BROMIDE AND ALBUTEROL SULFATE 2.5; .5 MG/3ML; MG/3ML
1 SOLUTION RESPIRATORY (INHALATION)
Status: COMPLETED | OUTPATIENT
Start: 2024-08-28 | End: 2024-08-28

## 2024-08-28 RX ORDER — POTASSIUM CHLORIDE 7.45 MG/ML
10 INJECTION INTRAVENOUS PRN
Status: DISCONTINUED | OUTPATIENT
Start: 2024-08-28 | End: 2024-08-28

## 2024-08-28 RX ORDER — ARFORMOTEROL TARTRATE 15 UG/2ML
15 SOLUTION RESPIRATORY (INHALATION)
Status: DISCONTINUED | OUTPATIENT
Start: 2024-08-28 | End: 2024-09-09 | Stop reason: HOSPADM

## 2024-08-28 RX ORDER — CLONIDINE HYDROCHLORIDE 0.1 MG/1
0.1 TABLET ORAL 3 TIMES DAILY
Status: ON HOLD | COMMUNITY
End: 2024-09-09

## 2024-08-28 RX ORDER — LABETALOL HYDROCHLORIDE 5 MG/ML
10 INJECTION, SOLUTION INTRAVENOUS EVERY 4 HOURS PRN
Status: DISCONTINUED | OUTPATIENT
Start: 2024-08-28 | End: 2024-09-09 | Stop reason: HOSPADM

## 2024-08-28 RX ORDER — MIRTAZAPINE 7.5 MG/1
7.5 TABLET, FILM COATED ORAL NIGHTLY
COMMUNITY

## 2024-08-28 RX ORDER — DOCUSATE SODIUM 100 MG/1
100 CAPSULE, LIQUID FILLED ORAL DAILY
Status: DISCONTINUED | OUTPATIENT
Start: 2024-08-28 | End: 2024-09-09 | Stop reason: HOSPADM

## 2024-08-28 RX ORDER — POLYETHYLENE GLYCOL 3350 17 G/17G
17 POWDER, FOR SOLUTION ORAL DAILY
Status: DISCONTINUED | OUTPATIENT
Start: 2024-08-28 | End: 2024-09-09 | Stop reason: HOSPADM

## 2024-08-28 RX ORDER — CILOSTAZOL 50 MG/1
50 TABLET ORAL 2 TIMES DAILY
COMMUNITY

## 2024-08-28 RX ORDER — ONDANSETRON 2 MG/ML
4 INJECTION INTRAMUSCULAR; INTRAVENOUS EVERY 6 HOURS PRN
Status: DISCONTINUED | OUTPATIENT
Start: 2024-08-28 | End: 2024-09-09 | Stop reason: HOSPADM

## 2024-08-28 RX ORDER — POTASSIUM CHLORIDE 1500 MG/1
40 TABLET, EXTENDED RELEASE ORAL PRN
Status: DISCONTINUED | OUTPATIENT
Start: 2024-08-28 | End: 2024-08-28

## 2024-08-28 RX ORDER — DULOXETIN HYDROCHLORIDE 60 MG/1
120 CAPSULE, DELAYED RELEASE ORAL DAILY
COMMUNITY

## 2024-08-28 RX ORDER — HYDRALAZINE HYDROCHLORIDE 100 MG/1
100 TABLET, FILM COATED ORAL 3 TIMES DAILY
COMMUNITY

## 2024-08-28 RX ORDER — LOSARTAN POTASSIUM 25 MG/1
25 TABLET ORAL DAILY
Status: ON HOLD | COMMUNITY
End: 2024-09-09 | Stop reason: HOSPADM

## 2024-08-28 RX ORDER — POTASSIUM CHLORIDE 7.45 MG/ML
10 INJECTION INTRAVENOUS PRN
Status: DISCONTINUED | OUTPATIENT
Start: 2024-08-28 | End: 2024-08-30

## 2024-08-28 RX ORDER — SODIUM CHLORIDE 9 MG/ML
INJECTION, SOLUTION INTRAVENOUS PRN
Status: DISCONTINUED | OUTPATIENT
Start: 2024-08-28 | End: 2024-08-30

## 2024-08-28 RX ORDER — FERROUS SULFATE 325(65) MG
325 TABLET ORAL 2 TIMES DAILY WITH MEALS
Status: DISCONTINUED | OUTPATIENT
Start: 2024-08-28 | End: 2024-09-09 | Stop reason: HOSPADM

## 2024-08-28 RX ORDER — ACETAMINOPHEN 650 MG/1
650 SUPPOSITORY RECTAL EVERY 6 HOURS PRN
Status: DISCONTINUED | OUTPATIENT
Start: 2024-08-28 | End: 2024-09-09 | Stop reason: HOSPADM

## 2024-08-28 RX ORDER — PRIMIDONE 50 MG/1
50 TABLET ORAL NIGHTLY
Status: ON HOLD | COMMUNITY
End: 2024-09-09 | Stop reason: HOSPADM

## 2024-08-28 RX ORDER — ENOXAPARIN SODIUM 100 MG/ML
30 INJECTION SUBCUTANEOUS 2 TIMES DAILY
Status: DISCONTINUED | OUTPATIENT
Start: 2024-08-28 | End: 2024-08-28

## 2024-08-28 RX ORDER — MAGNESIUM SULFATE IN WATER 40 MG/ML
2000 INJECTION, SOLUTION INTRAVENOUS PRN
Status: DISCONTINUED | OUTPATIENT
Start: 2024-08-28 | End: 2024-09-09 | Stop reason: HOSPADM

## 2024-08-28 RX ORDER — LABETALOL HYDROCHLORIDE 5 MG/ML
10 INJECTION, SOLUTION INTRAVENOUS ONCE
Status: COMPLETED | OUTPATIENT
Start: 2024-08-28 | End: 2024-08-28

## 2024-08-28 RX ORDER — DEXTROSE MONOHYDRATE 100 MG/ML
INJECTION, SOLUTION INTRAVENOUS CONTINUOUS PRN
Status: DISCONTINUED | OUTPATIENT
Start: 2024-08-28 | End: 2024-09-09 | Stop reason: HOSPADM

## 2024-08-28 RX ORDER — HYDRALAZINE HYDROCHLORIDE 20 MG/ML
10 INJECTION INTRAMUSCULAR; INTRAVENOUS EVERY 4 HOURS PRN
Status: DISCONTINUED | OUTPATIENT
Start: 2024-08-28 | End: 2024-09-09 | Stop reason: HOSPADM

## 2024-08-28 RX ORDER — GABAPENTIN 800 MG/1
800 TABLET ORAL 4 TIMES DAILY
COMMUNITY

## 2024-08-28 RX ADMIN — VANCOMYCIN HYDROCHLORIDE 2500 MG: 10 INJECTION, POWDER, LYOPHILIZED, FOR SOLUTION INTRAVENOUS at 20:46

## 2024-08-28 RX ADMIN — LABETALOL HYDROCHLORIDE 10 MG: 5 INJECTION INTRAVENOUS at 11:47

## 2024-08-28 RX ADMIN — NITROGLYCERIN 100 MCG/MIN: 20 INJECTION INTRAVENOUS at 13:42

## 2024-08-28 RX ADMIN — WATER 125 MG: 1 INJECTION INTRAMUSCULAR; INTRAVENOUS; SUBCUTANEOUS at 10:53

## 2024-08-28 RX ADMIN — HEPARIN SODIUM 5000 UNITS: 5000 INJECTION INTRAVENOUS; SUBCUTANEOUS at 18:38

## 2024-08-28 RX ADMIN — SODIUM CHLORIDE, PRESERVATIVE FREE 10 ML: 5 INJECTION INTRAVENOUS at 19:56

## 2024-08-28 RX ADMIN — LABETALOL HYDROCHLORIDE 10 MG: 5 INJECTION INTRAVENOUS at 21:22

## 2024-08-28 RX ADMIN — BUMETANIDE 2 MG: 0.25 INJECTION INTRAMUSCULAR; INTRAVENOUS at 20:21

## 2024-08-28 RX ADMIN — IPRATROPIUM BROMIDE AND ALBUTEROL SULFATE 1 DOSE: .5; 2.5 SOLUTION RESPIRATORY (INHALATION) at 10:30

## 2024-08-28 RX ADMIN — PIPERACILLIN AND TAZOBACTAM 4500 MG: 4; .5 INJECTION, POWDER, FOR SOLUTION INTRAVENOUS at 18:40

## 2024-08-28 RX ADMIN — NITROGLYCERIN 150 MCG/MIN: 20 INJECTION INTRAVENOUS at 20:21

## 2024-08-28 RX ADMIN — INSULIN HUMAN 10 UNITS: 100 INJECTION, SOLUTION PARENTERAL at 20:14

## 2024-08-28 RX ADMIN — HYDRALAZINE HYDROCHLORIDE 10 MG: 20 INJECTION INTRAMUSCULAR; INTRAVENOUS at 22:01

## 2024-08-28 RX ADMIN — DEXTROSE MONOHYDRATE 25 G: 25 INJECTION, SOLUTION INTRAVENOUS at 20:14

## 2024-08-28 RX ADMIN — SODIUM CHLORIDE, PRESERVATIVE FREE 10 ML: 5 INJECTION INTRAVENOUS at 19:57

## 2024-08-28 RX ADMIN — IPRATROPIUM BROMIDE AND ALBUTEROL SULFATE 1 DOSE: .5; 2.5 SOLUTION RESPIRATORY (INHALATION) at 10:39

## 2024-08-28 RX ADMIN — WATER 2000 MG: 1 INJECTION INTRAMUSCULAR; INTRAVENOUS; SUBCUTANEOUS at 13:44

## 2024-08-28 RX ADMIN — IOPAMIDOL 70 ML: 755 INJECTION, SOLUTION INTRAVENOUS at 13:01

## 2024-08-28 RX ADMIN — CALCIUM GLUCONATE 1000 MG: 10 INJECTION, SOLUTION INTRAVENOUS at 20:01

## 2024-08-28 RX ADMIN — IPRATROPIUM BROMIDE AND ALBUTEROL SULFATE 1 DOSE: .5; 2.5 SOLUTION RESPIRATORY (INHALATION) at 10:35

## 2024-08-28 RX ADMIN — DOXYCYCLINE 100 MG: 100 INJECTION, POWDER, LYOPHILIZED, FOR SOLUTION INTRAVENOUS at 13:45

## 2024-08-28 RX ADMIN — WATER 40 MG: 1 INJECTION INTRAMUSCULAR; INTRAVENOUS; SUBCUTANEOUS at 18:04

## 2024-08-28 ASSESSMENT — PAIN SCALES - GENERAL: PAINLEVEL_OUTOF10: 0

## 2024-08-29 ENCOUNTER — APPOINTMENT (OUTPATIENT)
Dept: GENERAL RADIOLOGY | Age: 69
DRG: 193 | End: 2024-08-29
Payer: MEDICARE

## 2024-08-29 LAB
ANION GAP SERPL CALCULATED.3IONS-SCNC: 10 MMOL/L (ref 7–16)
ANION GAP SERPL CALCULATED.3IONS-SCNC: 11 MMOL/L (ref 7–16)
ANION GAP SERPL CALCULATED.3IONS-SCNC: 12 MMOL/L (ref 7–16)
ANION GAP SERPL CALCULATED.3IONS-SCNC: 9 MMOL/L (ref 7–16)
ANION GAP SERPL CALCULATED.3IONS-SCNC: 9 MMOL/L (ref 7–16)
BUN SERPL-MCNC: 38 MG/DL (ref 6–23)
BUN SERPL-MCNC: 39 MG/DL (ref 6–23)
BUN SERPL-MCNC: 40 MG/DL (ref 6–23)
CALCIUM SERPL-MCNC: 8.9 MG/DL (ref 8.6–10.2)
CALCIUM SERPL-MCNC: 9 MG/DL (ref 8.6–10.2)
CALCIUM SERPL-MCNC: 9 MG/DL (ref 8.6–10.2)
CALCIUM SERPL-MCNC: 9.1 MG/DL (ref 8.6–10.2)
CALCIUM SERPL-MCNC: 9.3 MG/DL (ref 8.6–10.2)
CHLORIDE SERPL-SCNC: 101 MMOL/L (ref 98–107)
CHLORIDE SERPL-SCNC: 104 MMOL/L (ref 98–107)
CHOLEST SERPL-MCNC: 154 MG/DL
CO2 SERPL-SCNC: 26 MMOL/L (ref 22–29)
CO2 SERPL-SCNC: 28 MMOL/L (ref 22–29)
CREAT SERPL-MCNC: 1.9 MG/DL (ref 0.7–1.2)
CREAT SERPL-MCNC: 2 MG/DL (ref 0.7–1.2)
CREAT SERPL-MCNC: 2.1 MG/DL (ref 0.7–1.2)
FIO2: 70
GFR, ESTIMATED: 34 ML/MIN/1.73M2
GFR, ESTIMATED: 37 ML/MIN/1.73M2
GFR, ESTIMATED: 37 ML/MIN/1.73M2
GFR, ESTIMATED: 38 ML/MIN/1.73M2
GFR, ESTIMATED: 40 ML/MIN/1.73M2
GLUCOSE BLD-MCNC: 148 MG/DL (ref 74–99)
GLUCOSE BLD-MCNC: 171 MG/DL (ref 74–99)
GLUCOSE BLD-MCNC: 178 MG/DL (ref 74–99)
GLUCOSE BLD-MCNC: 187 MG/DL (ref 74–99)
GLUCOSE SERPL-MCNC: 132 MG/DL (ref 74–99)
GLUCOSE SERPL-MCNC: 137 MG/DL (ref 74–99)
GLUCOSE SERPL-MCNC: 150 MG/DL (ref 74–99)
GLUCOSE SERPL-MCNC: 175 MG/DL (ref 74–99)
GLUCOSE SERPL-MCNC: 187 MG/DL (ref 74–99)
HCT VFR BLD AUTO: 26 % (ref 37–54)
HDLC SERPL-MCNC: 46 MG/DL
L PNEUMO1 AG UR QL IA.RAPID: NEGATIVE
LDLC SERPL CALC-MCNC: 96 MG/DL
MAGNESIUM SERPL-MCNC: 1.8 MG/DL (ref 1.6–2.6)
MYCOPLASMA AB,IGM: PRESENT
O2 DELIVERY DEVICE: ABNORMAL
PEEP: 8
POC HCO3: 28.8 MMOL/L (ref 22–26)
POC HEMOGLOBIN (CALC): 9 G/DL (ref 12.5–15.5)
POC O2 SATURATION: 97 % (ref 92–98.5)
POC PCO2: 49.7 MM HG (ref 35–45)
POC PH: 7.37 (ref 7.35–7.45)
POC PO2: 94.9 MM HG (ref 60–80)
POSITIVE BASE EXCESS, ART: 3 MMOL/L (ref 0–3)
POTASSIUM SERPL-SCNC: 4.6 MMOL/L (ref 3.5–5)
POTASSIUM SERPL-SCNC: 4.7 MMOL/L (ref 3.5–5)
POTASSIUM SERPL-SCNC: 4.8 MMOL/L (ref 3.5–5)
POTASSIUM SERPL-SCNC: 4.8 MMOL/L (ref 3.5–5)
POTASSIUM SERPL-SCNC: 4.9 MMOL/L (ref 3.5–5)
S PNEUM AG SPEC QL: NEGATIVE
SET RATE, POC: 22
SODIUM SERPL-SCNC: 139 MMOL/L (ref 132–146)
SODIUM SERPL-SCNC: 139 MMOL/L (ref 132–146)
SODIUM SERPL-SCNC: 140 MMOL/L (ref 132–146)
SODIUM SERPL-SCNC: 141 MMOL/L (ref 132–146)
SODIUM SERPL-SCNC: 141 MMOL/L (ref 132–146)
SPECIMEN SOURCE: NORMAL
TIDAL VOLUME: 500
TRIGL SERPL-MCNC: 61 MG/DL
VLDLC SERPL CALC-MCNC: 12 MG/DL

## 2024-08-29 PROCEDURE — 82962 GLUCOSE BLOOD TEST: CPT

## 2024-08-29 PROCEDURE — 2580000003 HC RX 258

## 2024-08-29 PROCEDURE — 82803 BLOOD GASES ANY COMBINATION: CPT

## 2024-08-29 PROCEDURE — 2580000003 HC RX 258: Performed by: INTERNAL MEDICINE

## 2024-08-29 PROCEDURE — 6360000002 HC RX W HCPCS

## 2024-08-29 PROCEDURE — 71045 X-RAY EXAM CHEST 1 VIEW: CPT

## 2024-08-29 PROCEDURE — 2000000000 HC ICU R&B

## 2024-08-29 PROCEDURE — 99232 SBSQ HOSP IP/OBS MODERATE 35: CPT | Performed by: INTERNAL MEDICINE

## 2024-08-29 PROCEDURE — 6370000000 HC RX 637 (ALT 250 FOR IP)

## 2024-08-29 PROCEDURE — 2500000003 HC RX 250 WO HCPCS

## 2024-08-29 PROCEDURE — 2700000000 HC OXYGEN THERAPY PER DAY

## 2024-08-29 PROCEDURE — 6360000002 HC RX W HCPCS: Performed by: STUDENT IN AN ORGANIZED HEALTH CARE EDUCATION/TRAINING PROGRAM

## 2024-08-29 PROCEDURE — 6360000002 HC RX W HCPCS: Performed by: INTERNAL MEDICINE

## 2024-08-29 PROCEDURE — 2580000003 HC RX 258: Performed by: STUDENT IN AN ORGANIZED HEALTH CARE EDUCATION/TRAINING PROGRAM

## 2024-08-29 PROCEDURE — 99291 CRITICAL CARE FIRST HOUR: CPT | Performed by: INTERNAL MEDICINE

## 2024-08-29 PROCEDURE — 94640 AIRWAY INHALATION TREATMENT: CPT

## 2024-08-29 PROCEDURE — 80061 LIPID PANEL: CPT

## 2024-08-29 PROCEDURE — 85014 HEMATOCRIT: CPT

## 2024-08-29 PROCEDURE — 94660 CPAP INITIATION&MGMT: CPT

## 2024-08-29 PROCEDURE — 80048 BASIC METABOLIC PNL TOTAL CA: CPT

## 2024-08-29 PROCEDURE — 83735 ASSAY OF MAGNESIUM: CPT

## 2024-08-29 RX ORDER — LABETALOL HYDROCHLORIDE 5 MG/ML
10 INJECTION, SOLUTION INTRAVENOUS ONCE
Status: COMPLETED | OUTPATIENT
Start: 2024-08-30 | End: 2024-08-29

## 2024-08-29 RX ORDER — INSULIN LISPRO 100 [IU]/ML
0-4 INJECTION, SOLUTION INTRAVENOUS; SUBCUTANEOUS NIGHTLY
Status: DISCONTINUED | OUTPATIENT
Start: 2024-08-29 | End: 2024-09-09 | Stop reason: HOSPADM

## 2024-08-29 RX ORDER — HYDROXYZINE HYDROCHLORIDE 50 MG/ML
50 INJECTION, SOLUTION INTRAMUSCULAR EVERY 6 HOURS PRN
Status: DISCONTINUED | OUTPATIENT
Start: 2024-08-29 | End: 2024-09-09 | Stop reason: HOSPADM

## 2024-08-29 RX ORDER — INSULIN LISPRO 100 [IU]/ML
0-4 INJECTION, SOLUTION INTRAVENOUS; SUBCUTANEOUS
Status: DISCONTINUED | OUTPATIENT
Start: 2024-08-29 | End: 2024-09-01

## 2024-08-29 RX ORDER — LIDOCAINE HYDROCHLORIDE 10 MG/ML
5 INJECTION, SOLUTION INFILTRATION; PERINEURAL ONCE
Status: DISCONTINUED | OUTPATIENT
Start: 2024-08-30 | End: 2024-09-04

## 2024-08-29 RX ADMIN — HEPARIN SODIUM 5000 UNITS: 5000 INJECTION INTRAVENOUS; SUBCUTANEOUS at 10:21

## 2024-08-29 RX ADMIN — SODIUM CHLORIDE, PRESERVATIVE FREE 40 MG: 5 INJECTION INTRAVENOUS at 09:55

## 2024-08-29 RX ADMIN — ARFORMOTEROL TARTRATE 15 MCG: 15 SOLUTION RESPIRATORY (INHALATION) at 20:24

## 2024-08-29 RX ADMIN — LABETALOL HYDROCHLORIDE 10 MG: 5 INJECTION INTRAVENOUS at 21:20

## 2024-08-29 RX ADMIN — ACETAMINOPHEN 650 MG: 325 TABLET ORAL at 15:46

## 2024-08-29 RX ADMIN — FERROUS SULFATE TAB 325 MG (65 MG ELEMENTAL FE) 325 MG: 325 (65 FE) TAB at 15:39

## 2024-08-29 RX ADMIN — BUMETANIDE 0.5 MG/HR: 0.25 INJECTION INTRAMUSCULAR; INTRAVENOUS at 00:41

## 2024-08-29 RX ADMIN — IPRATROPIUM BROMIDE AND ALBUTEROL SULFATE 1 DOSE: 2.5; .5 SOLUTION RESPIRATORY (INHALATION) at 12:56

## 2024-08-29 RX ADMIN — SENNOSIDES 8.6 MG: 8.6 TABLET, FILM COATED ORAL at 21:05

## 2024-08-29 RX ADMIN — BUDESONIDE INHALATION 500 MCG: 0.5 SUSPENSION RESPIRATORY (INHALATION) at 09:46

## 2024-08-29 RX ADMIN — DOXYCYCLINE 100 MG: 100 INJECTION, POWDER, LYOPHILIZED, FOR SOLUTION INTRAVENOUS at 01:14

## 2024-08-29 RX ADMIN — IPRATROPIUM BROMIDE AND ALBUTEROL SULFATE 1 DOSE: 2.5; .5 SOLUTION RESPIRATORY (INHALATION) at 20:24

## 2024-08-29 RX ADMIN — IPRATROPIUM BROMIDE AND ALBUTEROL SULFATE 1 DOSE: 2.5; .5 SOLUTION RESPIRATORY (INHALATION) at 09:46

## 2024-08-29 RX ADMIN — HEPARIN SODIUM 5000 UNITS: 5000 INJECTION INTRAVENOUS; SUBCUTANEOUS at 02:47

## 2024-08-29 RX ADMIN — PIPERACILLIN AND TAZOBACTAM 4500 MG: 4; .5 INJECTION, POWDER, FOR SOLUTION INTRAVENOUS at 10:04

## 2024-08-29 RX ADMIN — NITROGLYCERIN 120 MCG/MIN: 20 INJECTION INTRAVENOUS at 20:38

## 2024-08-29 RX ADMIN — AZITHROMYCIN MONOHYDRATE 500 MG: 500 INJECTION, POWDER, LYOPHILIZED, FOR SOLUTION INTRAVENOUS at 14:12

## 2024-08-29 RX ADMIN — SODIUM CHLORIDE, PRESERVATIVE FREE 10 ML: 5 INJECTION INTRAVENOUS at 10:21

## 2024-08-29 RX ADMIN — WATER 40 MG: 1 INJECTION INTRAMUSCULAR; INTRAVENOUS; SUBCUTANEOUS at 15:39

## 2024-08-29 RX ADMIN — PIPERACILLIN AND TAZOBACTAM 4500 MG: 4; .5 INJECTION, POWDER, FOR SOLUTION INTRAVENOUS at 17:30

## 2024-08-29 RX ADMIN — LABETALOL HYDROCHLORIDE 10 MG: 5 INJECTION INTRAVENOUS at 23:50

## 2024-08-29 RX ADMIN — BUMETANIDE 0.5 MG/HR: 0.25 INJECTION INTRAMUSCULAR; INTRAVENOUS at 20:10

## 2024-08-29 RX ADMIN — NITROGLYCERIN 95 MCG/MIN: 20 INJECTION INTRAVENOUS at 10:57

## 2024-08-29 RX ADMIN — DOCUSATE SODIUM 100 MG: 100 CAPSULE, LIQUID FILLED ORAL at 09:56

## 2024-08-29 RX ADMIN — SODIUM CHLORIDE, PRESERVATIVE FREE 10 ML: 5 INJECTION INTRAVENOUS at 21:05

## 2024-08-29 RX ADMIN — ARFORMOTEROL TARTRATE 15 MCG: 15 SOLUTION RESPIRATORY (INHALATION) at 09:46

## 2024-08-29 RX ADMIN — SODIUM CHLORIDE, PRESERVATIVE FREE 10 ML: 5 INJECTION INTRAVENOUS at 21:07

## 2024-08-29 RX ADMIN — VANCOMYCIN HYDROCHLORIDE 1000 MG: 1 INJECTION, POWDER, LYOPHILIZED, FOR SOLUTION INTRAVENOUS at 09:00

## 2024-08-29 RX ADMIN — FERROUS SULFATE TAB 325 MG (65 MG ELEMENTAL FE) 325 MG: 325 (65 FE) TAB at 09:56

## 2024-08-29 RX ADMIN — PIPERACILLIN AND TAZOBACTAM 4500 MG: 4; .5 INJECTION, POWDER, FOR SOLUTION INTRAVENOUS at 01:07

## 2024-08-29 RX ADMIN — IPRATROPIUM BROMIDE AND ALBUTEROL SULFATE 1 DOSE: 2.5; .5 SOLUTION RESPIRATORY (INHALATION) at 16:45

## 2024-08-29 RX ADMIN — SODIUM CHLORIDE, PRESERVATIVE FREE 10 ML: 5 INJECTION INTRAVENOUS at 08:06

## 2024-08-29 RX ADMIN — WATER 40 MG: 1 INJECTION INTRAMUSCULAR; INTRAVENOUS; SUBCUTANEOUS at 05:43

## 2024-08-29 RX ADMIN — BUDESONIDE INHALATION 500 MCG: 0.5 SUSPENSION RESPIRATORY (INHALATION) at 20:24

## 2024-08-29 RX ADMIN — POLYETHYLENE GLYCOL 3350 17 G: 17 POWDER, FOR SOLUTION ORAL at 09:56

## 2024-08-29 RX ADMIN — HYDRALAZINE HYDROCHLORIDE 10 MG: 20 INJECTION INTRAMUSCULAR; INTRAVENOUS at 20:23

## 2024-08-29 RX ADMIN — NITROGLYCERIN 110 MCG/MIN: 20 INJECTION INTRAVENOUS at 02:02

## 2024-08-29 ASSESSMENT — PAIN SCALES - GENERAL
PAINLEVEL_OUTOF10: 0
PAINLEVEL_OUTOF10: 10
PAINLEVEL_OUTOF10: 0

## 2024-08-29 ASSESSMENT — PAIN DESCRIPTION - DESCRIPTORS: DESCRIPTORS: ACHING

## 2024-08-29 ASSESSMENT — PAIN DESCRIPTION - PAIN TYPE: TYPE: ACUTE PAIN

## 2024-08-29 ASSESSMENT — PAIN DESCRIPTION - LOCATION: LOCATION: HEAD

## 2024-08-30 ENCOUNTER — APPOINTMENT (OUTPATIENT)
Dept: GENERAL RADIOLOGY | Age: 69
DRG: 193 | End: 2024-08-30
Payer: MEDICARE

## 2024-08-30 ENCOUNTER — APPOINTMENT (OUTPATIENT)
Dept: ULTRASOUND IMAGING | Age: 69
DRG: 193 | End: 2024-08-30
Payer: MEDICARE

## 2024-08-30 LAB
AMPHET UR QL SCN: NEGATIVE
ANION GAP SERPL CALCULATED.3IONS-SCNC: 10 MMOL/L (ref 7–16)
ANION GAP SERPL CALCULATED.3IONS-SCNC: 11 MMOL/L (ref 7–16)
APAP SERPL-MCNC: <5 UG/ML (ref 10–30)
APPEARANCE FLD: NORMAL
BARBITURATES UR QL SCN: POSITIVE
BENZODIAZ UR QL: NEGATIVE
BODY FLD TYPE: NORMAL
BUN SERPL-MCNC: 39 MG/DL (ref 6–23)
BUN SERPL-MCNC: 40 MG/DL (ref 6–23)
BUPRENORPHINE UR QL: NEGATIVE
CALCIUM SERPL-MCNC: 9 MG/DL (ref 8.6–10.2)
CALCIUM SERPL-MCNC: 9.3 MG/DL (ref 8.6–10.2)
CANNABINOIDS UR QL SCN: NEGATIVE
CHLORIDE SERPL-SCNC: 100 MMOL/L (ref 98–107)
CHLORIDE SERPL-SCNC: 102 MMOL/L (ref 98–107)
CLOT CHECK: NORMAL
CO2 SERPL-SCNC: 30 MMOL/L (ref 22–29)
CO2 SERPL-SCNC: 31 MMOL/L (ref 22–29)
COCAINE UR QL SCN: NEGATIVE
COLOR FLD: YELLOW
CREAT SERPL-MCNC: 2 MG/DL (ref 0.7–1.2)
CREAT SERPL-MCNC: 2.2 MG/DL (ref 0.7–1.2)
DATE LAST DOSE: NORMAL
ETHANOLAMINE SERPL-MCNC: <10 MG/DL (ref 0–0.08)
FENTANYL UR QL: NEGATIVE
GFR, ESTIMATED: 33 ML/MIN/1.73M2
GFR, ESTIMATED: 36 ML/MIN/1.73M2
GLUCOSE BLD-MCNC: 169 MG/DL (ref 74–99)
GLUCOSE BLD-MCNC: 170 MG/DL (ref 74–99)
GLUCOSE BLD-MCNC: 176 MG/DL (ref 74–99)
GLUCOSE BLD-MCNC: 266 MG/DL (ref 74–99)
GLUCOSE FLD-MCNC: 164 MG/DL
GLUCOSE SERPL-MCNC: 141 MG/DL (ref 74–99)
GLUCOSE SERPL-MCNC: 149 MG/DL (ref 74–99)
LDH FLD L TO P-CCNC: 545 U/L
MAGNESIUM SERPL-MCNC: 1.6 MG/DL (ref 1.6–2.6)
METHADONE UR QL: NEGATIVE
MICROORGANISM SPEC CULT: NORMAL
MONOCYTES NFR FLD: 46 %
NEUTROPHILS NFR FLD: 54 %
OPIATES UR QL SCN: NEGATIVE
OXYCODONE UR QL SCN: NEGATIVE
PCP UR QL SCN: NEGATIVE
POTASSIUM SERPL-SCNC: 3.8 MMOL/L (ref 3.5–5)
POTASSIUM SERPL-SCNC: 4.2 MMOL/L (ref 3.5–5)
PROT FLD-MCNC: 4.3 G/DL
RBC # FLD: 8000 CELLS/UL
SALICYLATES SERPL-MCNC: <0.3 MG/DL (ref 0–30)
SODIUM SERPL-SCNC: 140 MMOL/L (ref 132–146)
SODIUM SERPL-SCNC: 144 MMOL/L (ref 132–146)
SPECIMEN DESCRIPTION: NORMAL
SPECIMEN TYPE: NORMAL
TEST INFORMATION: ABNORMAL
TME LAST DOSE: NORMAL H
TOXIC TRICYCLIC SC,BLOOD: NEGATIVE
VANCOMYCIN DOSE: NORMAL MG
VANCOMYCIN SERPL-MCNC: 15.2 UG/ML (ref 5–40)
WBC # FLD: 338 CELLS/UL

## 2024-08-30 PROCEDURE — 83986 ASSAY PH BODY FLUID NOS: CPT

## 2024-08-30 PROCEDURE — 80179 DRUG ASSAY SALICYLATE: CPT

## 2024-08-30 PROCEDURE — 94660 CPAP INITIATION&MGMT: CPT

## 2024-08-30 PROCEDURE — 84157 ASSAY OF PROTEIN OTHER: CPT

## 2024-08-30 PROCEDURE — 87205 SMEAR GRAM STAIN: CPT

## 2024-08-30 PROCEDURE — 89051 BODY FLUID CELL COUNT: CPT

## 2024-08-30 PROCEDURE — 2580000003 HC RX 258

## 2024-08-30 PROCEDURE — 32555 ASPIRATE PLEURA W/ IMAGING: CPT

## 2024-08-30 PROCEDURE — 2580000003 HC RX 258: Performed by: INTERNAL MEDICINE

## 2024-08-30 PROCEDURE — 6360000002 HC RX W HCPCS

## 2024-08-30 PROCEDURE — 94640 AIRWAY INHALATION TREATMENT: CPT

## 2024-08-30 PROCEDURE — 6370000000 HC RX 637 (ALT 250 FOR IP)

## 2024-08-30 PROCEDURE — 6370000000 HC RX 637 (ALT 250 FOR IP): Performed by: INTERNAL MEDICINE

## 2024-08-30 PROCEDURE — 32551 INSERTION OF CHEST TUBE: CPT

## 2024-08-30 PROCEDURE — 0W9930Z DRAINAGE OF RIGHT PLEURAL CAVITY WITH DRAINAGE DEVICE, PERCUTANEOUS APPROACH: ICD-10-PCS | Performed by: INTERNAL MEDICINE

## 2024-08-30 PROCEDURE — 71045 X-RAY EXAM CHEST 1 VIEW: CPT

## 2024-08-30 PROCEDURE — 99291 CRITICAL CARE FIRST HOUR: CPT | Performed by: INTERNAL MEDICINE

## 2024-08-30 PROCEDURE — 87015 SPECIMEN INFECT AGNT CONCNTJ: CPT

## 2024-08-30 PROCEDURE — 03HY32Z INSERTION OF MONITORING DEVICE INTO UPPER ARTERY, PERCUTANEOUS APPROACH: ICD-10-PCS | Performed by: INTERNAL MEDICINE

## 2024-08-30 PROCEDURE — 87206 SMEAR FLUORESCENT/ACID STAI: CPT

## 2024-08-30 PROCEDURE — 0W993ZZ DRAINAGE OF RIGHT PLEURAL CAVITY, PERCUTANEOUS APPROACH: ICD-10-PCS | Performed by: INTERNAL MEDICINE

## 2024-08-30 PROCEDURE — 87102 FUNGUS ISOLATION CULTURE: CPT

## 2024-08-30 PROCEDURE — 88305 TISSUE EXAM BY PATHOLOGIST: CPT

## 2024-08-30 PROCEDURE — 80307 DRUG TEST PRSMV CHEM ANLYZR: CPT

## 2024-08-30 PROCEDURE — 80048 BASIC METABOLIC PNL TOTAL CA: CPT

## 2024-08-30 PROCEDURE — 6360000002 HC RX W HCPCS: Performed by: STUDENT IN AN ORGANIZED HEALTH CARE EDUCATION/TRAINING PROGRAM

## 2024-08-30 PROCEDURE — 80202 ASSAY OF VANCOMYCIN: CPT

## 2024-08-30 PROCEDURE — 0W9B3ZZ DRAINAGE OF LEFT PLEURAL CAVITY, PERCUTANEOUS APPROACH: ICD-10-PCS | Performed by: INTERNAL MEDICINE

## 2024-08-30 PROCEDURE — 87070 CULTURE OTHR SPECIMN AEROBIC: CPT

## 2024-08-30 PROCEDURE — G0480 DRUG TEST DEF 1-7 CLASSES: HCPCS

## 2024-08-30 PROCEDURE — 2500000003 HC RX 250 WO HCPCS

## 2024-08-30 PROCEDURE — 2000000000 HC ICU R&B

## 2024-08-30 PROCEDURE — 6360000002 HC RX W HCPCS: Performed by: INTERNAL MEDICINE

## 2024-08-30 PROCEDURE — 2700000000 HC OXYGEN THERAPY PER DAY

## 2024-08-30 PROCEDURE — 02HV33Z INSERTION OF INFUSION DEVICE INTO SUPERIOR VENA CAVA, PERCUTANEOUS APPROACH: ICD-10-PCS | Performed by: INTERNAL MEDICINE

## 2024-08-30 PROCEDURE — 83735 ASSAY OF MAGNESIUM: CPT

## 2024-08-30 PROCEDURE — 0W9B30Z DRAINAGE OF LEFT PLEURAL CAVITY WITH DRAINAGE DEVICE, PERCUTANEOUS APPROACH: ICD-10-PCS | Performed by: INTERNAL MEDICINE

## 2024-08-30 PROCEDURE — 80143 DRUG ASSAY ACETAMINOPHEN: CPT

## 2024-08-30 PROCEDURE — 99232 SBSQ HOSP IP/OBS MODERATE 35: CPT | Performed by: INTERNAL MEDICINE

## 2024-08-30 PROCEDURE — 36415 COLL VENOUS BLD VENIPUNCTURE: CPT

## 2024-08-30 PROCEDURE — 83615 LACTATE (LD) (LDH) ENZYME: CPT

## 2024-08-30 PROCEDURE — 87116 MYCOBACTERIA CULTURE: CPT

## 2024-08-30 PROCEDURE — 32557 INSERT CATH PLEURA W/ IMAGE: CPT | Performed by: INTERNAL MEDICINE

## 2024-08-30 PROCEDURE — 82962 GLUCOSE BLOOD TEST: CPT

## 2024-08-30 PROCEDURE — 87040 BLOOD CULTURE FOR BACTERIA: CPT

## 2024-08-30 PROCEDURE — 82945 GLUCOSE OTHER FLUID: CPT

## 2024-08-30 PROCEDURE — 36556 INSERT NON-TUNNEL CV CATH: CPT | Performed by: INTERNAL MEDICINE

## 2024-08-30 PROCEDURE — 88112 CYTOPATH CELL ENHANCE TECH: CPT

## 2024-08-30 RX ORDER — MIDAZOLAM HYDROCHLORIDE 1 MG/ML
INJECTION INTRAMUSCULAR; INTRAVENOUS
Status: DISCONTINUED
Start: 2024-08-30 | End: 2024-08-30

## 2024-08-30 RX ORDER — LABETALOL HYDROCHLORIDE 5 MG/ML
10 INJECTION, SOLUTION INTRAVENOUS ONCE
Status: COMPLETED | OUTPATIENT
Start: 2024-08-30 | End: 2024-08-30

## 2024-08-30 RX ORDER — LIDOCAINE HYDROCHLORIDE 10 MG/ML
5 INJECTION, SOLUTION INFILTRATION; PERINEURAL ONCE
Status: COMPLETED | OUTPATIENT
Start: 2024-08-30 | End: 2024-08-30

## 2024-08-30 RX ORDER — CARVEDILOL 6.25 MG/1
6.25 TABLET ORAL 2 TIMES DAILY WITH MEALS
Status: DISCONTINUED | OUTPATIENT
Start: 2024-08-30 | End: 2024-08-31

## 2024-08-30 RX ORDER — AMLODIPINE BESYLATE 10 MG/1
10 TABLET ORAL DAILY
Status: DISCONTINUED | OUTPATIENT
Start: 2024-08-30 | End: 2024-09-09 | Stop reason: HOSPADM

## 2024-08-30 RX ADMIN — HYDROXYZINE HYDROCHLORIDE 50 MG: 50 INJECTION, SOLUTION INTRAMUSCULAR at 01:02

## 2024-08-30 RX ADMIN — SENNOSIDES 8.6 MG: 8.6 TABLET, FILM COATED ORAL at 22:03

## 2024-08-30 RX ADMIN — SODIUM CHLORIDE 125 MG: 9 INJECTION, SOLUTION INTRAVENOUS at 17:27

## 2024-08-30 RX ADMIN — WATER 40 MG: 1 INJECTION INTRAMUSCULAR; INTRAVENOUS; SUBCUTANEOUS at 05:26

## 2024-08-30 RX ADMIN — SODIUM CHLORIDE 10 MG/HR: 9 INJECTION, SOLUTION INTRAVENOUS at 20:24

## 2024-08-30 RX ADMIN — ARFORMOTEROL TARTRATE 15 MCG: 15 SOLUTION RESPIRATORY (INHALATION) at 07:40

## 2024-08-30 RX ADMIN — ACETAMINOPHEN 650 MG: 325 TABLET ORAL at 22:03

## 2024-08-30 RX ADMIN — SODIUM CHLORIDE 10 MG/HR: 9 INJECTION, SOLUTION INTRAVENOUS at 23:31

## 2024-08-30 RX ADMIN — DOCUSATE SODIUM 100 MG: 100 CAPSULE, LIQUID FILLED ORAL at 09:40

## 2024-08-30 RX ADMIN — SODIUM CHLORIDE, PRESERVATIVE FREE 10 ML: 5 INJECTION INTRAVENOUS at 09:41

## 2024-08-30 RX ADMIN — NITROGLYCERIN 200 MCG/MIN: 20 INJECTION INTRAVENOUS at 01:52

## 2024-08-30 RX ADMIN — SODIUM CHLORIDE, PRESERVATIVE FREE 40 MG: 5 INJECTION INTRAVENOUS at 09:41

## 2024-08-30 RX ADMIN — SODIUM CHLORIDE, PRESERVATIVE FREE 10 ML: 5 INJECTION INTRAVENOUS at 21:52

## 2024-08-30 RX ADMIN — PIPERACILLIN AND TAZOBACTAM 4500 MG: 4; .5 INJECTION, POWDER, FOR SOLUTION INTRAVENOUS at 10:00

## 2024-08-30 RX ADMIN — IPRATROPIUM BROMIDE AND ALBUTEROL SULFATE 1 DOSE: 2.5; .5 SOLUTION RESPIRATORY (INHALATION) at 20:13

## 2024-08-30 RX ADMIN — IPRATROPIUM BROMIDE AND ALBUTEROL SULFATE 1 DOSE: 2.5; .5 SOLUTION RESPIRATORY (INHALATION) at 11:19

## 2024-08-30 RX ADMIN — IPRATROPIUM BROMIDE AND ALBUTEROL SULFATE 1 DOSE: 2.5; .5 SOLUTION RESPIRATORY (INHALATION) at 16:37

## 2024-08-30 RX ADMIN — WATER 40 MG: 1 INJECTION INTRAMUSCULAR; INTRAVENOUS; SUBCUTANEOUS at 17:32

## 2024-08-30 RX ADMIN — SODIUM CHLORIDE 10 MG/HR: 9 INJECTION, SOLUTION INTRAVENOUS at 18:23

## 2024-08-30 RX ADMIN — ARFORMOTEROL TARTRATE 15 MCG: 15 SOLUTION RESPIRATORY (INHALATION) at 20:13

## 2024-08-30 RX ADMIN — LABETALOL HYDROCHLORIDE 10 MG: 5 INJECTION INTRAVENOUS at 03:26

## 2024-08-30 RX ADMIN — LIDOCAINE HYDROCHLORIDE 5 ML: 10 INJECTION, SOLUTION INFILTRATION; PERINEURAL at 01:41

## 2024-08-30 RX ADMIN — IPRATROPIUM BROMIDE AND ALBUTEROL SULFATE 1 DOSE: 2.5; .5 SOLUTION RESPIRATORY (INHALATION) at 07:40

## 2024-08-30 RX ADMIN — BUDESONIDE INHALATION 500 MCG: 0.5 SUSPENSION RESPIRATORY (INHALATION) at 07:40

## 2024-08-30 RX ADMIN — BUDESONIDE INHALATION 500 MCG: 0.5 SUSPENSION RESPIRATORY (INHALATION) at 20:13

## 2024-08-30 RX ADMIN — HYDRALAZINE HYDROCHLORIDE 10 MG: 20 INJECTION INTRAMUSCULAR; INTRAVENOUS at 03:26

## 2024-08-30 RX ADMIN — LABETALOL HYDROCHLORIDE 10 MG: 5 INJECTION INTRAVENOUS at 23:35

## 2024-08-30 RX ADMIN — PIPERACILLIN AND TAZOBACTAM 4500 MG: 4; .5 INJECTION, POWDER, FOR SOLUTION INTRAVENOUS at 18:35

## 2024-08-30 RX ADMIN — NITROGLYCERIN 200 MCG/MIN: 20 INJECTION INTRAVENOUS at 06:53

## 2024-08-30 RX ADMIN — FERROUS SULFATE TAB 325 MG (65 MG ELEMENTAL FE) 325 MG: 325 (65 FE) TAB at 09:40

## 2024-08-30 RX ADMIN — FERROUS SULFATE TAB 325 MG (65 MG ELEMENTAL FE) 325 MG: 325 (65 FE) TAB at 17:33

## 2024-08-30 RX ADMIN — SODIUM CHLORIDE 10 MG/HR: 9 INJECTION, SOLUTION INTRAVENOUS at 11:56

## 2024-08-30 RX ADMIN — AMLODIPINE BESYLATE 10 MG: 10 TABLET ORAL at 18:00

## 2024-08-30 RX ADMIN — AZITHROMYCIN MONOHYDRATE 500 MG: 500 INJECTION, POWDER, LYOPHILIZED, FOR SOLUTION INTRAVENOUS at 12:00

## 2024-08-30 RX ADMIN — PIPERACILLIN AND TAZOBACTAM 4500 MG: 4; .5 INJECTION, POWDER, FOR SOLUTION INTRAVENOUS at 01:23

## 2024-08-30 RX ADMIN — POLYETHYLENE GLYCOL 3350 17 G: 17 POWDER, FOR SOLUTION ORAL at 09:41

## 2024-08-30 RX ADMIN — CARVEDILOL 6.25 MG: 6.25 TABLET, FILM COATED ORAL at 17:33

## 2024-08-30 RX ADMIN — SODIUM CHLORIDE 10 MG/HR: 9 INJECTION, SOLUTION INTRAVENOUS at 15:11

## 2024-08-30 RX ADMIN — VANCOMYCIN HYDROCHLORIDE 2000 MG: 10 INJECTION, POWDER, LYOPHILIZED, FOR SOLUTION INTRAVENOUS at 09:46

## 2024-08-30 RX ADMIN — HYDRALAZINE HYDROCHLORIDE 10 MG: 20 INJECTION INTRAMUSCULAR; INTRAVENOUS at 06:57

## 2024-08-30 RX ADMIN — SODIUM CHLORIDE 5 MG/HR: 9 INJECTION, SOLUTION INTRAVENOUS at 08:04

## 2024-08-30 ASSESSMENT — PAIN SCALES - GENERAL
PAINLEVEL_OUTOF10: 0
PAINLEVEL_OUTOF10: 6
PAINLEVEL_OUTOF10: 0

## 2024-08-30 ASSESSMENT — PAIN DESCRIPTION - ORIENTATION: ORIENTATION: RIGHT;LEFT

## 2024-08-30 ASSESSMENT — PAIN DESCRIPTION - LOCATION: LOCATION: RIB CAGE

## 2024-08-30 ASSESSMENT — PAIN - FUNCTIONAL ASSESSMENT: PAIN_FUNCTIONAL_ASSESSMENT: PREVENTS OR INTERFERES WITH MANY ACTIVE NOT PASSIVE ACTIVITIES

## 2024-08-31 LAB
ACB COMPLEX DNA BLD POS QL NAA+NON-PROBE: NOT DETECTED
ANION GAP SERPL CALCULATED.3IONS-SCNC: 13 MMOL/L (ref 7–16)
ANION GAP SERPL CALCULATED.3IONS-SCNC: 9 MMOL/L (ref 7–16)
B FRAGILIS DNA BLD POS QL NAA+NON-PROBE: NOT DETECTED
BIOFIRE TEST COMMENT: ABNORMAL
BNP SERPL-MCNC: 2512 PG/ML (ref 0–125)
BUN SERPL-MCNC: 31 MG/DL (ref 6–23)
BUN SERPL-MCNC: 33 MG/DL (ref 6–23)
C ALBICANS DNA BLD POS QL NAA+NON-PROBE: NOT DETECTED
C AURIS DNA BLD POS QL NAA+NON-PROBE: NOT DETECTED
C GATTII+NEOFOR DNA BLD POS QL NAA+N-PRB: NOT DETECTED
C GLABRATA DNA BLD POS QL NAA+NON-PROBE: NOT DETECTED
C KRUSEI DNA BLD POS QL NAA+NON-PROBE: NOT DETECTED
C PARAP DNA BLD POS QL NAA+NON-PROBE: NOT DETECTED
C TROPICLS DNA BLD POS QL NAA+NON-PROBE: NOT DETECTED
CALCIUM SERPL-MCNC: 8.8 MG/DL (ref 8.6–10.2)
CALCIUM SERPL-MCNC: 9.1 MG/DL (ref 8.6–10.2)
CHLORIDE SERPL-SCNC: 97 MMOL/L (ref 98–107)
CHLORIDE SERPL-SCNC: 99 MMOL/L (ref 98–107)
CO2 SERPL-SCNC: 31 MMOL/L (ref 22–29)
CO2 SERPL-SCNC: 32 MMOL/L (ref 22–29)
CREAT SERPL-MCNC: 1.7 MG/DL (ref 0.7–1.2)
CREAT SERPL-MCNC: 1.9 MG/DL (ref 0.7–1.2)
E CLOAC COMP DNA BLD POS NAA+NON-PROBE: NOT DETECTED
E COLI DNA BLD POS QL NAA+NON-PROBE: NOT DETECTED
E FAECALIS DNA BLD POS QL NAA+NON-PROBE: NOT DETECTED
E FAECIUM DNA BLD POS QL NAA+NON-PROBE: NOT DETECTED
ENTEROBACTERALES DNA BLD POS NAA+N-PRB: NOT DETECTED
ERYTHROCYTE [DISTWIDTH] IN BLOOD BY AUTOMATED COUNT: 17.5 % (ref 11.5–15)
GFR, ESTIMATED: 39 ML/MIN/1.73M2
GFR, ESTIMATED: 44 ML/MIN/1.73M2
GLUCOSE BLD-MCNC: 190 MG/DL (ref 74–99)
GLUCOSE BLD-MCNC: 245 MG/DL (ref 74–99)
GLUCOSE BLD-MCNC: 248 MG/DL (ref 74–99)
GLUCOSE BLD-MCNC: 260 MG/DL (ref 74–99)
GLUCOSE SERPL-MCNC: 142 MG/DL (ref 74–99)
GLUCOSE SERPL-MCNC: 240 MG/DL (ref 74–99)
GP B STREP DNA BLD POS QL NAA+NON-PROBE: NOT DETECTED
HAEM INFLU DNA BLD POS QL NAA+NON-PROBE: NOT DETECTED
HCT VFR BLD AUTO: 33.5 % (ref 37–54)
HGB BLD-MCNC: 10.3 G/DL (ref 12.5–16.5)
K OXYTOCA DNA BLD POS QL NAA+NON-PROBE: NOT DETECTED
KLEBSIELLA SP DNA BLD POS QL NAA+NON-PRB: NOT DETECTED
KLEBSIELLA SP DNA BLD POS QL NAA+NON-PRB: NOT DETECTED
L MONOCYTOG DNA BLD POS QL NAA+NON-PROBE: NOT DETECTED
LDH SERPL-CCNC: 164 U/L (ref 135–225)
MAGNESIUM SERPL-MCNC: 1.4 MG/DL (ref 1.6–2.6)
MCH RBC QN AUTO: 24.4 PG (ref 26–35)
MCHC RBC AUTO-ENTMCNC: 30.7 G/DL (ref 32–34.5)
MCV RBC AUTO: 79.4 FL (ref 80–99.9)
MICROORGANISM SPEC CULT: ABNORMAL
MICROORGANISM/AGENT SPEC: ABNORMAL
N MEN DNA BLD POS QL NAA+NON-PROBE: NOT DETECTED
P AERUGINOSA DNA BLD POS NAA+NON-PROBE: NOT DETECTED
PLATELET # BLD AUTO: 246 K/UL (ref 130–450)
PMV BLD AUTO: 10.9 FL (ref 7–12)
POTASSIUM SERPL-SCNC: 3.7 MMOL/L (ref 3.5–5)
POTASSIUM SERPL-SCNC: 4.7 MMOL/L (ref 3.5–5)
PROTEUS SP DNA BLD POS QL NAA+NON-PROBE: NOT DETECTED
RBC # BLD AUTO: 4.22 M/UL (ref 3.8–5.8)
S AUREUS DNA BLD POS QL NAA+NON-PROBE: NOT DETECTED
S AUREUS+CONS DNA BLD POS NAA+NON-PROBE: NOT DETECTED
S EPIDERMIDIS DNA BLD POS QL NAA+NON-PRB: NOT DETECTED
S LUGDUNENSIS DNA BLD POS QL NAA+NON-PRB: NOT DETECTED
S MALTOPHILIA DNA BLD POS QL NAA+NON-PRB: NOT DETECTED
S MARCESCENS DNA BLD POS NAA+NON-PROBE: NOT DETECTED
S PNEUM DNA BLD POS QL NAA+NON-PROBE: NOT DETECTED
S PYO DNA BLD POS QL NAA+NON-PROBE: NOT DETECTED
SALMONELLA DNA BLD POS QL NAA+NON-PROBE: NOT DETECTED
SERVICE CMNT-IMP: ABNORMAL
SODIUM SERPL-SCNC: 139 MMOL/L (ref 132–146)
SODIUM SERPL-SCNC: 142 MMOL/L (ref 132–146)
SPECIMEN DESCRIPTION: ABNORMAL
STREPTOCOCCUS DNA BLD POS NAA+NON-PROBE: NOT DETECTED
WBC OTHER # BLD: 13.3 K/UL (ref 4.5–11.5)

## 2024-08-31 PROCEDURE — 2000000000 HC ICU R&B

## 2024-08-31 PROCEDURE — 6360000002 HC RX W HCPCS

## 2024-08-31 PROCEDURE — 94640 AIRWAY INHALATION TREATMENT: CPT

## 2024-08-31 PROCEDURE — 6370000000 HC RX 637 (ALT 250 FOR IP): Performed by: INTERNAL MEDICINE

## 2024-08-31 PROCEDURE — 6370000000 HC RX 637 (ALT 250 FOR IP)

## 2024-08-31 PROCEDURE — 94660 CPAP INITIATION&MGMT: CPT

## 2024-08-31 PROCEDURE — 83615 LACTATE (LD) (LDH) ENZYME: CPT

## 2024-08-31 PROCEDURE — 82962 GLUCOSE BLOOD TEST: CPT

## 2024-08-31 PROCEDURE — 2580000003 HC RX 258: Performed by: INTERNAL MEDICINE

## 2024-08-31 PROCEDURE — 2700000000 HC OXYGEN THERAPY PER DAY

## 2024-08-31 PROCEDURE — 6360000002 HC RX W HCPCS: Performed by: INTERNAL MEDICINE

## 2024-08-31 PROCEDURE — 2580000003 HC RX 258

## 2024-08-31 PROCEDURE — 85027 COMPLETE CBC AUTOMATED: CPT

## 2024-08-31 PROCEDURE — 99291 CRITICAL CARE FIRST HOUR: CPT | Performed by: INTERNAL MEDICINE

## 2024-08-31 PROCEDURE — 83880 ASSAY OF NATRIURETIC PEPTIDE: CPT

## 2024-08-31 PROCEDURE — 83735 ASSAY OF MAGNESIUM: CPT

## 2024-08-31 PROCEDURE — 80048 BASIC METABOLIC PNL TOTAL CA: CPT

## 2024-08-31 PROCEDURE — 99231 SBSQ HOSP IP/OBS SF/LOW 25: CPT | Performed by: INTERNAL MEDICINE

## 2024-08-31 RX ORDER — AZITHROMYCIN 250 MG/1
500 TABLET, FILM COATED ORAL DAILY
Status: COMPLETED | OUTPATIENT
Start: 2024-08-31 | End: 2024-09-04

## 2024-08-31 RX ORDER — DULOXETIN HYDROCHLORIDE 30 MG/1
120 CAPSULE, DELAYED RELEASE ORAL DAILY
Status: DISCONTINUED | OUTPATIENT
Start: 2024-08-31 | End: 2024-09-09 | Stop reason: HOSPADM

## 2024-08-31 RX ORDER — POTASSIUM CHLORIDE 1500 MG/1
20 TABLET, EXTENDED RELEASE ORAL ONCE
Status: COMPLETED | OUTPATIENT
Start: 2024-08-31 | End: 2024-08-31

## 2024-08-31 RX ORDER — CARVEDILOL 6.25 MG/1
6.25 TABLET ORAL ONCE
Status: COMPLETED | OUTPATIENT
Start: 2024-08-31 | End: 2024-08-31

## 2024-08-31 RX ORDER — GABAPENTIN 300 MG/1
600 CAPSULE ORAL 3 TIMES DAILY
Status: DISCONTINUED | OUTPATIENT
Start: 2024-08-31 | End: 2024-09-09 | Stop reason: HOSPADM

## 2024-08-31 RX ORDER — MIRTAZAPINE 15 MG/1
7.5 TABLET, FILM COATED ORAL NIGHTLY
Status: DISCONTINUED | OUTPATIENT
Start: 2024-08-31 | End: 2024-09-09 | Stop reason: HOSPADM

## 2024-08-31 RX ORDER — CARVEDILOL 6.25 MG/1
12.5 TABLET ORAL 2 TIMES DAILY WITH MEALS
Status: DISCONTINUED | OUTPATIENT
Start: 2024-08-31 | End: 2024-09-02

## 2024-08-31 RX ORDER — MAGNESIUM SULFATE IN WATER 40 MG/ML
2000 INJECTION, SOLUTION INTRAVENOUS
Status: COMPLETED | OUTPATIENT
Start: 2024-08-31 | End: 2024-08-31

## 2024-08-31 RX ADMIN — HEPARIN SODIUM 5000 UNITS: 5000 INJECTION INTRAVENOUS; SUBCUTANEOUS at 02:28

## 2024-08-31 RX ADMIN — ARFORMOTEROL TARTRATE 15 MCG: 15 SOLUTION RESPIRATORY (INHALATION) at 08:22

## 2024-08-31 RX ADMIN — BUDESONIDE INHALATION 500 MCG: 0.5 SUSPENSION RESPIRATORY (INHALATION) at 08:22

## 2024-08-31 RX ADMIN — SODIUM CHLORIDE 12.5 MG/HR: 9 INJECTION, SOLUTION INTRAVENOUS at 11:23

## 2024-08-31 RX ADMIN — MAGNESIUM SULFATE HEPTAHYDRATE 2000 MG: 40 INJECTION, SOLUTION INTRAVENOUS at 10:59

## 2024-08-31 RX ADMIN — CARVEDILOL 6.25 MG: 6.25 TABLET, FILM COATED ORAL at 12:47

## 2024-08-31 RX ADMIN — IPRATROPIUM BROMIDE AND ALBUTEROL SULFATE 1 DOSE: 2.5; .5 SOLUTION RESPIRATORY (INHALATION) at 20:00

## 2024-08-31 RX ADMIN — SODIUM CHLORIDE 7.5 MG/HR: 9 INJECTION, SOLUTION INTRAVENOUS at 14:53

## 2024-08-31 RX ADMIN — MAGNESIUM SULFATE HEPTAHYDRATE 2000 MG: 40 INJECTION, SOLUTION INTRAVENOUS at 08:37

## 2024-08-31 RX ADMIN — SENNOSIDES 8.6 MG: 8.6 TABLET, FILM COATED ORAL at 21:09

## 2024-08-31 RX ADMIN — WATER 40 MG: 1 INJECTION INTRAMUSCULAR; INTRAVENOUS; SUBCUTANEOUS at 05:06

## 2024-08-31 RX ADMIN — INSULIN LISPRO 2 UNITS: 100 INJECTION, SOLUTION INTRAVENOUS; SUBCUTANEOUS at 12:28

## 2024-08-31 RX ADMIN — HYDRALAZINE HYDROCHLORIDE 10 MG: 20 INJECTION INTRAMUSCULAR; INTRAVENOUS at 11:01

## 2024-08-31 RX ADMIN — HEPARIN SODIUM 5000 UNITS: 5000 INJECTION INTRAVENOUS; SUBCUTANEOUS at 08:53

## 2024-08-31 RX ADMIN — POTASSIUM CHLORIDE 20 MEQ: 1500 TABLET, EXTENDED RELEASE ORAL at 08:52

## 2024-08-31 RX ADMIN — SODIUM CHLORIDE 10 MG/HR: 9 INJECTION, SOLUTION INTRAVENOUS at 19:07

## 2024-08-31 RX ADMIN — LABETALOL HYDROCHLORIDE 10 MG: 5 INJECTION INTRAVENOUS at 13:40

## 2024-08-31 RX ADMIN — CARVEDILOL 12.5 MG: 6.25 TABLET, FILM COATED ORAL at 17:21

## 2024-08-31 RX ADMIN — GABAPENTIN 600 MG: 300 CAPSULE ORAL at 21:08

## 2024-08-31 RX ADMIN — DULOXETINE HYDROCHLORIDE 120 MG: 60 CAPSULE, DELAYED RELEASE ORAL at 21:22

## 2024-08-31 RX ADMIN — WATER 40 MG: 1 INJECTION INTRAMUSCULAR; INTRAVENOUS; SUBCUTANEOUS at 17:21

## 2024-08-31 RX ADMIN — HEPARIN SODIUM 5000 UNITS: 5000 INJECTION INTRAVENOUS; SUBCUTANEOUS at 17:21

## 2024-08-31 RX ADMIN — DOCUSATE SODIUM 100 MG: 100 CAPSULE, LIQUID FILLED ORAL at 08:52

## 2024-08-31 RX ADMIN — AMLODIPINE BESYLATE 10 MG: 10 TABLET ORAL at 08:52

## 2024-08-31 RX ADMIN — PIPERACILLIN AND TAZOBACTAM 4500 MG: 4; .5 INJECTION, POWDER, FOR SOLUTION INTRAVENOUS at 17:22

## 2024-08-31 RX ADMIN — SODIUM CHLORIDE 12.5 MG/HR: 9 INJECTION, SOLUTION INTRAVENOUS at 07:35

## 2024-08-31 RX ADMIN — IPRATROPIUM BROMIDE AND ALBUTEROL SULFATE 1 DOSE: 2.5; .5 SOLUTION RESPIRATORY (INHALATION) at 08:22

## 2024-08-31 RX ADMIN — SODIUM CHLORIDE, PRESERVATIVE FREE 40 MG: 5 INJECTION INTRAVENOUS at 08:53

## 2024-08-31 RX ADMIN — ARFORMOTEROL TARTRATE 15 MCG: 15 SOLUTION RESPIRATORY (INHALATION) at 20:00

## 2024-08-31 RX ADMIN — SODIUM CHLORIDE 15 MG/HR: 9 INJECTION, SOLUTION INTRAVENOUS at 09:35

## 2024-08-31 RX ADMIN — FERROUS SULFATE TAB 325 MG (65 MG ELEMENTAL FE) 325 MG: 325 (65 FE) TAB at 08:52

## 2024-08-31 RX ADMIN — HYDRALAZINE HYDROCHLORIDE 10 MG: 20 INJECTION INTRAMUSCULAR; INTRAVENOUS at 16:11

## 2024-08-31 RX ADMIN — SODIUM CHLORIDE 10 MG/HR: 9 INJECTION, SOLUTION INTRAVENOUS at 02:17

## 2024-08-31 RX ADMIN — PIPERACILLIN AND TAZOBACTAM 4500 MG: 4; .5 INJECTION, POWDER, FOR SOLUTION INTRAVENOUS at 00:48

## 2024-08-31 RX ADMIN — SODIUM CHLORIDE, PRESERVATIVE FREE 10 ML: 5 INJECTION INTRAVENOUS at 21:08

## 2024-08-31 RX ADMIN — IPRATROPIUM BROMIDE AND ALBUTEROL SULFATE 1 DOSE: 2.5; .5 SOLUTION RESPIRATORY (INHALATION) at 16:53

## 2024-08-31 RX ADMIN — SODIUM CHLORIDE 10 MG/HR: 9 INJECTION, SOLUTION INTRAVENOUS at 05:06

## 2024-08-31 RX ADMIN — CARVEDILOL 6.25 MG: 6.25 TABLET, FILM COATED ORAL at 08:53

## 2024-08-31 RX ADMIN — AZITHROMYCIN DIHYDRATE 500 MG: 250 TABLET ORAL at 12:28

## 2024-08-31 RX ADMIN — INSULIN LISPRO 1 UNITS: 100 INJECTION, SOLUTION INTRAVENOUS; SUBCUTANEOUS at 17:21

## 2024-08-31 RX ADMIN — FERROUS SULFATE TAB 325 MG (65 MG ELEMENTAL FE) 325 MG: 325 (65 FE) TAB at 17:21

## 2024-08-31 RX ADMIN — MIRTAZAPINE 7.5 MG: 15 TABLET, FILM COATED ORAL at 21:08

## 2024-08-31 RX ADMIN — BUDESONIDE INHALATION 500 MCG: 0.5 SUSPENSION RESPIRATORY (INHALATION) at 20:00

## 2024-08-31 RX ADMIN — IPRATROPIUM BROMIDE AND ALBUTEROL SULFATE 1 DOSE: 2.5; .5 SOLUTION RESPIRATORY (INHALATION) at 12:17

## 2024-08-31 RX ADMIN — SODIUM CHLORIDE 125 MG: 9 INJECTION, SOLUTION INTRAVENOUS at 08:51

## 2024-08-31 RX ADMIN — PIPERACILLIN AND TAZOBACTAM 4500 MG: 4; .5 INJECTION, POWDER, FOR SOLUTION INTRAVENOUS at 08:47

## 2024-08-31 ASSESSMENT — PAIN - FUNCTIONAL ASSESSMENT
PAIN_FUNCTIONAL_ASSESSMENT: PREVENTS OR INTERFERES SOME ACTIVE ACTIVITIES AND ADLS
PAIN_FUNCTIONAL_ASSESSMENT: ACTIVITIES ARE NOT PREVENTED

## 2024-08-31 ASSESSMENT — PAIN DESCRIPTION - ONSET: ONSET: ON-GOING

## 2024-08-31 ASSESSMENT — PAIN SCALES - GENERAL
PAINLEVEL_OUTOF10: 10
PAINLEVEL_OUTOF10: 3

## 2024-08-31 ASSESSMENT — PAIN DESCRIPTION - LOCATION
LOCATION: RIB CAGE
LOCATION: BACK

## 2024-08-31 ASSESSMENT — PAIN DESCRIPTION - ORIENTATION
ORIENTATION: RIGHT;LEFT;POSTERIOR
ORIENTATION: RIGHT;LEFT;POSTERIOR

## 2024-08-31 ASSESSMENT — PAIN DESCRIPTION - DESCRIPTORS
DESCRIPTORS: ACHING;DISCOMFORT;NAGGING
DESCRIPTORS: DISCOMFORT;ACHING;NAGGING

## 2024-08-31 ASSESSMENT — PAIN DESCRIPTION - FREQUENCY: FREQUENCY: CONTINUOUS

## 2024-08-31 ASSESSMENT — PAIN DESCRIPTION - PAIN TYPE: TYPE: CHRONIC PAIN

## 2024-09-01 ENCOUNTER — APPOINTMENT (OUTPATIENT)
Dept: GENERAL RADIOLOGY | Age: 69
DRG: 193 | End: 2024-09-01
Payer: MEDICARE

## 2024-09-01 LAB
ANION GAP SERPL CALCULATED.3IONS-SCNC: 10 MMOL/L (ref 7–16)
ANION GAP SERPL CALCULATED.3IONS-SCNC: 11 MMOL/L (ref 7–16)
BUN SERPL-MCNC: 30 MG/DL (ref 6–23)
BUN SERPL-MCNC: 36 MG/DL (ref 6–23)
CALCIUM SERPL-MCNC: 8.7 MG/DL (ref 8.6–10.2)
CALCIUM SERPL-MCNC: 9.2 MG/DL (ref 8.6–10.2)
CHLORIDE SERPL-SCNC: 101 MMOL/L (ref 98–107)
CHLORIDE SERPL-SCNC: 99 MMOL/L (ref 98–107)
CO2 SERPL-SCNC: 29 MMOL/L (ref 22–29)
CO2 SERPL-SCNC: 30 MMOL/L (ref 22–29)
CREAT SERPL-MCNC: 1.9 MG/DL (ref 0.7–1.2)
CREAT SERPL-MCNC: 1.9 MG/DL (ref 0.7–1.2)
ERYTHROCYTE [DISTWIDTH] IN BLOOD BY AUTOMATED COUNT: 17.6 % (ref 11.5–15)
GFR, ESTIMATED: 39 ML/MIN/1.73M2
GFR, ESTIMATED: 40 ML/MIN/1.73M2
GLUCOSE BLD-MCNC: 165 MG/DL (ref 74–99)
GLUCOSE BLD-MCNC: 185 MG/DL (ref 74–99)
GLUCOSE BLD-MCNC: 232 MG/DL (ref 74–99)
GLUCOSE BLD-MCNC: 244 MG/DL (ref 74–99)
GLUCOSE SERPL-MCNC: 208 MG/DL (ref 74–99)
GLUCOSE SERPL-MCNC: 220 MG/DL (ref 74–99)
HCT VFR BLD AUTO: 33 % (ref 37–54)
HGB BLD-MCNC: 10 G/DL (ref 12.5–16.5)
MAGNESIUM SERPL-MCNC: 2.1 MG/DL (ref 1.6–2.6)
MCH RBC QN AUTO: 23.9 PG (ref 26–35)
MCHC RBC AUTO-ENTMCNC: 30.3 G/DL (ref 32–34.5)
MCV RBC AUTO: 78.8 FL (ref 80–99.9)
MICROORGANISM SPEC CULT: ABNORMAL
MICROORGANISM/AGENT SPEC: ABNORMAL
PLATELET # BLD AUTO: 238 K/UL (ref 130–450)
PMV BLD AUTO: 10.5 FL (ref 7–12)
POTASSIUM SERPL-SCNC: 4.1 MMOL/L (ref 3.5–5)
POTASSIUM SERPL-SCNC: 4.5 MMOL/L (ref 3.5–5)
RBC # BLD AUTO: 4.19 M/UL (ref 3.8–5.8)
SERVICE CMNT-IMP: ABNORMAL
SODIUM SERPL-SCNC: 140 MMOL/L (ref 132–146)
SODIUM SERPL-SCNC: 140 MMOL/L (ref 132–146)
SPECIMEN DESCRIPTION: ABNORMAL
WBC OTHER # BLD: 14.3 K/UL (ref 4.5–11.5)

## 2024-09-01 PROCEDURE — 6370000000 HC RX 637 (ALT 250 FOR IP): Performed by: INTERNAL MEDICINE

## 2024-09-01 PROCEDURE — 71045 X-RAY EXAM CHEST 1 VIEW: CPT

## 2024-09-01 PROCEDURE — 80048 BASIC METABOLIC PNL TOTAL CA: CPT

## 2024-09-01 PROCEDURE — 2700000000 HC OXYGEN THERAPY PER DAY

## 2024-09-01 PROCEDURE — 2580000003 HC RX 258: Performed by: INTERNAL MEDICINE

## 2024-09-01 PROCEDURE — 6370000000 HC RX 637 (ALT 250 FOR IP)

## 2024-09-01 PROCEDURE — 99232 SBSQ HOSP IP/OBS MODERATE 35: CPT | Performed by: INTERNAL MEDICINE

## 2024-09-01 PROCEDURE — 2580000003 HC RX 258

## 2024-09-01 PROCEDURE — 6360000002 HC RX W HCPCS

## 2024-09-01 PROCEDURE — 6360000002 HC RX W HCPCS: Performed by: INTERNAL MEDICINE

## 2024-09-01 PROCEDURE — 2000000000 HC ICU R&B

## 2024-09-01 PROCEDURE — 83735 ASSAY OF MAGNESIUM: CPT

## 2024-09-01 PROCEDURE — 99233 SBSQ HOSP IP/OBS HIGH 50: CPT | Performed by: INTERNAL MEDICINE

## 2024-09-01 PROCEDURE — 85027 COMPLETE CBC AUTOMATED: CPT

## 2024-09-01 PROCEDURE — 82962 GLUCOSE BLOOD TEST: CPT

## 2024-09-01 PROCEDURE — 94660 CPAP INITIATION&MGMT: CPT

## 2024-09-01 PROCEDURE — 94640 AIRWAY INHALATION TREATMENT: CPT

## 2024-09-01 RX ORDER — INSULIN GLARGINE 100 [IU]/ML
15 INJECTION, SOLUTION SUBCUTANEOUS DAILY
Status: DISCONTINUED | OUTPATIENT
Start: 2024-09-01 | End: 2024-09-09 | Stop reason: HOSPADM

## 2024-09-01 RX ORDER — HYDRALAZINE HYDROCHLORIDE 50 MG/1
100 TABLET, FILM COATED ORAL 3 TIMES DAILY
Status: DISCONTINUED | OUTPATIENT
Start: 2024-09-01 | End: 2024-09-09 | Stop reason: HOSPADM

## 2024-09-01 RX ORDER — CLONIDINE HYDROCHLORIDE 0.1 MG/1
0.1 TABLET ORAL 3 TIMES DAILY
Status: DISCONTINUED | OUTPATIENT
Start: 2024-09-01 | End: 2024-09-03

## 2024-09-01 RX ORDER — INSULIN LISPRO 100 [IU]/ML
0-8 INJECTION, SOLUTION INTRAVENOUS; SUBCUTANEOUS
Status: DISCONTINUED | OUTPATIENT
Start: 2024-09-01 | End: 2024-09-09 | Stop reason: HOSPADM

## 2024-09-01 RX ORDER — OXYCODONE HYDROCHLORIDE 5 MG/1
2.5 TABLET ORAL EVERY 6 HOURS PRN
Status: DISCONTINUED | OUTPATIENT
Start: 2024-09-01 | End: 2024-09-09 | Stop reason: HOSPADM

## 2024-09-01 RX ORDER — OXYCODONE AND ACETAMINOPHEN 5; 325 MG/1; MG/1
1 TABLET ORAL EVERY 6 HOURS PRN
Status: DISCONTINUED | OUTPATIENT
Start: 2024-09-01 | End: 2024-09-09 | Stop reason: HOSPADM

## 2024-09-01 RX ORDER — CHLORTHALIDONE 25 MG/1
25 TABLET ORAL DAILY
Status: DISCONTINUED | OUTPATIENT
Start: 2024-09-01 | End: 2024-09-02

## 2024-09-01 RX ORDER — OXYCODONE AND ACETAMINOPHEN 7.5; 325 MG/1; MG/1
1 TABLET ORAL EVERY 6 HOURS PRN
Status: DISCONTINUED | OUTPATIENT
Start: 2024-09-01 | End: 2024-09-01 | Stop reason: SDUPTHER

## 2024-09-01 RX ADMIN — SODIUM CHLORIDE 125 MG: 9 INJECTION, SOLUTION INTRAVENOUS at 08:32

## 2024-09-01 RX ADMIN — HEPARIN SODIUM 5000 UNITS: 5000 INJECTION INTRAVENOUS; SUBCUTANEOUS at 18:18

## 2024-09-01 RX ADMIN — IPRATROPIUM BROMIDE AND ALBUTEROL SULFATE 1 DOSE: 2.5; .5 SOLUTION RESPIRATORY (INHALATION) at 08:08

## 2024-09-01 RX ADMIN — PIPERACILLIN AND TAZOBACTAM 4500 MG: 4; .5 INJECTION, POWDER, FOR SOLUTION INTRAVENOUS at 16:11

## 2024-09-01 RX ADMIN — HYDRALAZINE HYDROCHLORIDE 100 MG: 50 TABLET ORAL at 19:47

## 2024-09-01 RX ADMIN — CHLORTHALIDONE 25 MG: 25 TABLET ORAL at 16:23

## 2024-09-01 RX ADMIN — INSULIN LISPRO 2 UNITS: 100 INJECTION, SOLUTION INTRAVENOUS; SUBCUTANEOUS at 12:47

## 2024-09-01 RX ADMIN — IPRATROPIUM BROMIDE AND ALBUTEROL SULFATE 1 DOSE: 2.5; .5 SOLUTION RESPIRATORY (INHALATION) at 15:46

## 2024-09-01 RX ADMIN — GABAPENTIN 600 MG: 300 CAPSULE ORAL at 13:49

## 2024-09-01 RX ADMIN — HYDRALAZINE HYDROCHLORIDE 10 MG: 20 INJECTION INTRAMUSCULAR; INTRAVENOUS at 10:36

## 2024-09-01 RX ADMIN — DOCUSATE SODIUM 100 MG: 100 CAPSULE, LIQUID FILLED ORAL at 08:33

## 2024-09-01 RX ADMIN — HYDRALAZINE HYDROCHLORIDE 10 MG: 20 INJECTION INTRAMUSCULAR; INTRAVENOUS at 22:42

## 2024-09-01 RX ADMIN — MIRTAZAPINE 7.5 MG: 15 TABLET, FILM COATED ORAL at 19:46

## 2024-09-01 RX ADMIN — SODIUM CHLORIDE, PRESERVATIVE FREE 40 MG: 5 INJECTION INTRAVENOUS at 08:23

## 2024-09-01 RX ADMIN — CARVEDILOL 12.5 MG: 6.25 TABLET, FILM COATED ORAL at 16:10

## 2024-09-01 RX ADMIN — SODIUM CHLORIDE 11.5 MG/HR: 9 INJECTION, SOLUTION INTRAVENOUS at 05:14

## 2024-09-01 RX ADMIN — HEPARIN SODIUM 5000 UNITS: 5000 INJECTION INTRAVENOUS; SUBCUTANEOUS at 02:42

## 2024-09-01 RX ADMIN — CLONIDINE HYDROCHLORIDE 0.1 MG: 0.1 TABLET ORAL at 08:33

## 2024-09-01 RX ADMIN — SODIUM CHLORIDE 5 MG/HR: 9 INJECTION, SOLUTION INTRAVENOUS at 16:39

## 2024-09-01 RX ADMIN — BUDESONIDE INHALATION 500 MCG: 0.5 SUSPENSION RESPIRATORY (INHALATION) at 20:10

## 2024-09-01 RX ADMIN — GABAPENTIN 600 MG: 300 CAPSULE ORAL at 08:33

## 2024-09-01 RX ADMIN — SENNOSIDES 8.6 MG: 8.6 TABLET, FILM COATED ORAL at 19:46

## 2024-09-01 RX ADMIN — WATER 40 MG: 1 INJECTION INTRAMUSCULAR; INTRAVENOUS; SUBCUTANEOUS at 04:11

## 2024-09-01 RX ADMIN — DULOXETINE HYDROCHLORIDE 120 MG: 60 CAPSULE, DELAYED RELEASE ORAL at 08:33

## 2024-09-01 RX ADMIN — GABAPENTIN 600 MG: 300 CAPSULE ORAL at 19:45

## 2024-09-01 RX ADMIN — INSULIN LISPRO 2 UNITS: 100 INJECTION, SOLUTION INTRAVENOUS; SUBCUTANEOUS at 08:36

## 2024-09-01 RX ADMIN — OXYCODONE HYDROCHLORIDE AND ACETAMINOPHEN 1 TABLET: 5; 325 TABLET ORAL at 08:33

## 2024-09-01 RX ADMIN — FERROUS SULFATE TAB 325 MG (65 MG ELEMENTAL FE) 325 MG: 325 (65 FE) TAB at 08:33

## 2024-09-01 RX ADMIN — LABETALOL HYDROCHLORIDE 10 MG: 5 INJECTION INTRAVENOUS at 05:59

## 2024-09-01 RX ADMIN — FERROUS SULFATE TAB 325 MG (65 MG ELEMENTAL FE) 325 MG: 325 (65 FE) TAB at 16:10

## 2024-09-01 RX ADMIN — HYDRALAZINE HYDROCHLORIDE 100 MG: 50 TABLET ORAL at 13:49

## 2024-09-01 RX ADMIN — HEPARIN SODIUM 5000 UNITS: 5000 INJECTION INTRAVENOUS; SUBCUTANEOUS at 08:36

## 2024-09-01 RX ADMIN — CLONIDINE HYDROCHLORIDE 0.1 MG: 0.1 TABLET ORAL at 19:45

## 2024-09-01 RX ADMIN — AZITHROMYCIN DIHYDRATE 500 MG: 250 TABLET ORAL at 08:33

## 2024-09-01 RX ADMIN — SODIUM CHLORIDE 11.5 MG/HR: 9 INJECTION, SOLUTION INTRAVENOUS at 07:12

## 2024-09-01 RX ADMIN — HYDRALAZINE HYDROCHLORIDE 100 MG: 50 TABLET ORAL at 08:33

## 2024-09-01 RX ADMIN — AMLODIPINE BESYLATE 10 MG: 10 TABLET ORAL at 08:33

## 2024-09-01 RX ADMIN — SODIUM CHLORIDE 15 MG/HR: 9 INJECTION, SOLUTION INTRAVENOUS at 22:30

## 2024-09-01 RX ADMIN — SODIUM CHLORIDE 12.5 MG/HR: 9 INJECTION, SOLUTION INTRAVENOUS at 02:48

## 2024-09-01 RX ADMIN — CLONIDINE HYDROCHLORIDE 0.1 MG: 0.1 TABLET ORAL at 13:49

## 2024-09-01 RX ADMIN — SODIUM CHLORIDE 12.5 MG/HR: 9 INJECTION, SOLUTION INTRAVENOUS at 00:26

## 2024-09-01 RX ADMIN — PIPERACILLIN AND TAZOBACTAM 4500 MG: 4; .5 INJECTION, POWDER, FOR SOLUTION INTRAVENOUS at 08:21

## 2024-09-01 RX ADMIN — IPRATROPIUM BROMIDE AND ALBUTEROL SULFATE 1 DOSE: 2.5; .5 SOLUTION RESPIRATORY (INHALATION) at 20:10

## 2024-09-01 RX ADMIN — SODIUM CHLORIDE, PRESERVATIVE FREE 10 ML: 5 INJECTION INTRAVENOUS at 19:47

## 2024-09-01 RX ADMIN — SODIUM CHLORIDE 12.5 MG/HR: 9 INJECTION, SOLUTION INTRAVENOUS at 18:21

## 2024-09-01 RX ADMIN — IPRATROPIUM BROMIDE AND ALBUTEROL SULFATE 1 DOSE: 2.5; .5 SOLUTION RESPIRATORY (INHALATION) at 11:37

## 2024-09-01 RX ADMIN — PIPERACILLIN AND TAZOBACTAM 4500 MG: 4; .5 INJECTION, POWDER, FOR SOLUTION INTRAVENOUS at 01:06

## 2024-09-01 RX ADMIN — INSULIN GLARGINE 15 UNITS: 100 INJECTION, SOLUTION SUBCUTANEOUS at 08:36

## 2024-09-01 RX ADMIN — HYDRALAZINE HYDROCHLORIDE 10 MG: 20 INJECTION INTRAMUSCULAR; INTRAVENOUS at 14:48

## 2024-09-01 RX ADMIN — BUDESONIDE INHALATION 500 MCG: 0.5 SUSPENSION RESPIRATORY (INHALATION) at 08:08

## 2024-09-01 RX ADMIN — SODIUM CHLORIDE 15 MG/HR: 9 INJECTION, SOLUTION INTRAVENOUS at 20:43

## 2024-09-01 RX ADMIN — HYDRALAZINE HYDROCHLORIDE 10 MG: 20 INJECTION INTRAMUSCULAR; INTRAVENOUS at 04:55

## 2024-09-01 RX ADMIN — HYDRALAZINE HYDROCHLORIDE 10 MG: 20 INJECTION INTRAMUSCULAR; INTRAVENOUS at 20:52

## 2024-09-01 RX ADMIN — ARFORMOTEROL TARTRATE 15 MCG: 15 SOLUTION RESPIRATORY (INHALATION) at 20:10

## 2024-09-01 RX ADMIN — CARVEDILOL 12.5 MG: 6.25 TABLET, FILM COATED ORAL at 08:33

## 2024-09-01 RX ADMIN — ARFORMOTEROL TARTRATE 15 MCG: 15 SOLUTION RESPIRATORY (INHALATION) at 08:08

## 2024-09-01 ASSESSMENT — PAIN DESCRIPTION - DESCRIPTORS: DESCRIPTORS: ACHING;DISCOMFORT;NAGGING

## 2024-09-01 ASSESSMENT — PAIN DESCRIPTION - LOCATION: LOCATION: BACK

## 2024-09-01 ASSESSMENT — PAIN DESCRIPTION - FREQUENCY: FREQUENCY: CONTINUOUS

## 2024-09-01 ASSESSMENT — PAIN DESCRIPTION - ONSET: ONSET: ON-GOING

## 2024-09-01 ASSESSMENT — PAIN DESCRIPTION - ORIENTATION: ORIENTATION: RIGHT;LEFT;MID;POSTERIOR

## 2024-09-01 ASSESSMENT — PAIN SCALES - GENERAL: PAINLEVEL_OUTOF10: 10

## 2024-09-01 ASSESSMENT — PAIN DESCRIPTION - PAIN TYPE: TYPE: CHRONIC PAIN

## 2024-09-01 ASSESSMENT — PAIN - FUNCTIONAL ASSESSMENT: PAIN_FUNCTIONAL_ASSESSMENT: PREVENTS OR INTERFERES SOME ACTIVE ACTIVITIES AND ADLS

## 2024-09-02 ENCOUNTER — APPOINTMENT (OUTPATIENT)
Dept: GENERAL RADIOLOGY | Age: 69
DRG: 193 | End: 2024-09-02
Payer: MEDICARE

## 2024-09-02 LAB
ANION GAP SERPL CALCULATED.3IONS-SCNC: 10 MMOL/L (ref 7–16)
ANION GAP SERPL CALCULATED.3IONS-SCNC: 7 MMOL/L (ref 7–16)
BUN SERPL-MCNC: 34 MG/DL (ref 6–23)
BUN SERPL-MCNC: 38 MG/DL (ref 6–23)
CALCIUM SERPL-MCNC: 8.7 MG/DL (ref 8.6–10.2)
CALCIUM SERPL-MCNC: 8.9 MG/DL (ref 8.6–10.2)
CHLORIDE SERPL-SCNC: 102 MMOL/L (ref 98–107)
CHLORIDE SERPL-SCNC: 105 MMOL/L (ref 98–107)
CO2 SERPL-SCNC: 29 MMOL/L (ref 22–29)
CO2 SERPL-SCNC: 29 MMOL/L (ref 22–29)
CREAT SERPL-MCNC: 2 MG/DL (ref 0.7–1.2)
CREAT SERPL-MCNC: 2 MG/DL (ref 0.7–1.2)
FERRITIN SERPL-MCNC: 474 NG/ML
GFR, ESTIMATED: 36 ML/MIN/1.73M2
GFR, ESTIMATED: 37 ML/MIN/1.73M2
GLUCOSE BLD-MCNC: 167 MG/DL (ref 74–99)
GLUCOSE BLD-MCNC: 171 MG/DL (ref 74–99)
GLUCOSE BLD-MCNC: 190 MG/DL (ref 74–99)
GLUCOSE BLD-MCNC: 192 MG/DL (ref 74–99)
GLUCOSE BLD-MCNC: 198 MG/DL (ref 74–99)
GLUCOSE BLD-MCNC: 210 MG/DL (ref 74–99)
GLUCOSE SERPL-MCNC: 202 MG/DL (ref 74–99)
GLUCOSE SERPL-MCNC: 213 MG/DL (ref 74–99)
MAGNESIUM SERPL-MCNC: 2 MG/DL (ref 1.6–2.6)
MICROORGANISM SPEC CULT: NO GROWTH
MICROORGANISM SPEC CULT: NO GROWTH
MICROORGANISM/AGENT SPEC: NORMAL
POTASSIUM SERPL-SCNC: 3.7 MMOL/L (ref 3.5–5)
POTASSIUM SERPL-SCNC: 4.7 MMOL/L (ref 3.5–5)
SERVICE CMNT-IMP: NORMAL
SERVICE CMNT-IMP: NORMAL
SODIUM SERPL-SCNC: 141 MMOL/L (ref 132–146)
SODIUM SERPL-SCNC: 141 MMOL/L (ref 132–146)
SPECIMEN DESCRIPTION: NORMAL
SPECIMEN DESCRIPTION: NORMAL

## 2024-09-02 PROCEDURE — 2580000003 HC RX 258: Performed by: INTERNAL MEDICINE

## 2024-09-02 PROCEDURE — 6360000002 HC RX W HCPCS

## 2024-09-02 PROCEDURE — 2000000000 HC ICU R&B

## 2024-09-02 PROCEDURE — 2580000003 HC RX 258

## 2024-09-02 PROCEDURE — 99291 CRITICAL CARE FIRST HOUR: CPT | Performed by: INTERNAL MEDICINE

## 2024-09-02 PROCEDURE — 6360000002 HC RX W HCPCS: Performed by: INTERNAL MEDICINE

## 2024-09-02 PROCEDURE — 94660 CPAP INITIATION&MGMT: CPT

## 2024-09-02 PROCEDURE — 6370000000 HC RX 637 (ALT 250 FOR IP)

## 2024-09-02 PROCEDURE — 94640 AIRWAY INHALATION TREATMENT: CPT

## 2024-09-02 PROCEDURE — 71045 X-RAY EXAM CHEST 1 VIEW: CPT

## 2024-09-02 PROCEDURE — 2700000000 HC OXYGEN THERAPY PER DAY

## 2024-09-02 PROCEDURE — 80048 BASIC METABOLIC PNL TOTAL CA: CPT

## 2024-09-02 PROCEDURE — 6370000000 HC RX 637 (ALT 250 FOR IP): Performed by: INTERNAL MEDICINE

## 2024-09-02 PROCEDURE — 99231 SBSQ HOSP IP/OBS SF/LOW 25: CPT | Performed by: INTERNAL MEDICINE

## 2024-09-02 PROCEDURE — 82962 GLUCOSE BLOOD TEST: CPT

## 2024-09-02 PROCEDURE — 83735 ASSAY OF MAGNESIUM: CPT

## 2024-09-02 PROCEDURE — 82728 ASSAY OF FERRITIN: CPT

## 2024-09-02 PROCEDURE — 51798 US URINE CAPACITY MEASURE: CPT

## 2024-09-02 RX ORDER — DOXAZOSIN 2 MG/1
2 TABLET ORAL
Status: DISCONTINUED | OUTPATIENT
Start: 2024-09-02 | End: 2024-09-09 | Stop reason: HOSPADM

## 2024-09-02 RX ORDER — CARVEDILOL 6.25 MG/1
12.5 TABLET ORAL 2 TIMES DAILY WITH MEALS
Status: DISCONTINUED | OUTPATIENT
Start: 2024-09-02 | End: 2024-09-04

## 2024-09-02 RX ORDER — CHLORTHALIDONE 25 MG/1
25 TABLET ORAL DAILY
Status: DISCONTINUED | OUTPATIENT
Start: 2024-09-03 | End: 2024-09-09 | Stop reason: HOSPADM

## 2024-09-02 RX ORDER — CHLORTHALIDONE 25 MG/1
25 TABLET ORAL 2 TIMES DAILY
Status: DISCONTINUED | OUTPATIENT
Start: 2024-09-02 | End: 2024-09-02

## 2024-09-02 RX ORDER — CARVEDILOL 6.25 MG/1
25 TABLET ORAL 2 TIMES DAILY WITH MEALS
Status: DISCONTINUED | OUTPATIENT
Start: 2024-09-02 | End: 2024-09-02

## 2024-09-02 RX ORDER — BUMETANIDE 0.25 MG/ML
1 INJECTION INTRAMUSCULAR; INTRAVENOUS ONCE
Status: COMPLETED | OUTPATIENT
Start: 2024-09-02 | End: 2024-09-02

## 2024-09-02 RX ADMIN — SODIUM CHLORIDE 15 MG/HR: 9 INJECTION, SOLUTION INTRAVENOUS at 00:50

## 2024-09-02 RX ADMIN — DOXAZOSIN 2 MG: 2 TABLET ORAL at 19:42

## 2024-09-02 RX ADMIN — PIPERACILLIN AND TAZOBACTAM 4500 MG: 4; .5 INJECTION, POWDER, FOR SOLUTION INTRAVENOUS at 01:01

## 2024-09-02 RX ADMIN — AMLODIPINE BESYLATE 10 MG: 10 TABLET ORAL at 09:10

## 2024-09-02 RX ADMIN — ARFORMOTEROL TARTRATE 15 MCG: 15 SOLUTION RESPIRATORY (INHALATION) at 08:08

## 2024-09-02 RX ADMIN — SODIUM CHLORIDE 2.5 MG/HR: 9 INJECTION, SOLUTION INTRAVENOUS at 13:52

## 2024-09-02 RX ADMIN — IPRATROPIUM BROMIDE AND ALBUTEROL SULFATE 1 DOSE: 2.5; .5 SOLUTION RESPIRATORY (INHALATION) at 08:08

## 2024-09-02 RX ADMIN — HEPARIN SODIUM 5000 UNITS: 5000 INJECTION INTRAVENOUS; SUBCUTANEOUS at 09:10

## 2024-09-02 RX ADMIN — INSULIN LISPRO 2 UNITS: 100 INJECTION, SOLUTION INTRAVENOUS; SUBCUTANEOUS at 14:26

## 2024-09-02 RX ADMIN — IPRATROPIUM BROMIDE AND ALBUTEROL SULFATE 1 DOSE: 2.5; .5 SOLUTION RESPIRATORY (INHALATION) at 15:41

## 2024-09-02 RX ADMIN — SODIUM CHLORIDE 125 MG: 9 INJECTION, SOLUTION INTRAVENOUS at 09:13

## 2024-09-02 RX ADMIN — CLONIDINE HYDROCHLORIDE 0.1 MG: 0.1 TABLET ORAL at 09:18

## 2024-09-02 RX ADMIN — HEPARIN SODIUM 5000 UNITS: 5000 INJECTION INTRAVENOUS; SUBCUTANEOUS at 18:39

## 2024-09-02 RX ADMIN — IPRATROPIUM BROMIDE AND ALBUTEROL SULFATE 1 DOSE: 2.5; .5 SOLUTION RESPIRATORY (INHALATION) at 12:32

## 2024-09-02 RX ADMIN — SODIUM CHLORIDE, PRESERVATIVE FREE 40 MG: 5 INJECTION INTRAVENOUS at 09:11

## 2024-09-02 RX ADMIN — CLONIDINE HYDROCHLORIDE 0.1 MG: 0.1 TABLET ORAL at 14:24

## 2024-09-02 RX ADMIN — HYDRALAZINE HYDROCHLORIDE 10 MG: 20 INJECTION INTRAMUSCULAR; INTRAVENOUS at 18:45

## 2024-09-02 RX ADMIN — HEPARIN SODIUM 5000 UNITS: 5000 INJECTION INTRAVENOUS; SUBCUTANEOUS at 03:24

## 2024-09-02 RX ADMIN — HYDRALAZINE HYDROCHLORIDE 100 MG: 50 TABLET ORAL at 09:10

## 2024-09-02 RX ADMIN — HYDRALAZINE HYDROCHLORIDE 100 MG: 50 TABLET ORAL at 19:35

## 2024-09-02 RX ADMIN — WATER 40 MG: 1 INJECTION INTRAMUSCULAR; INTRAVENOUS; SUBCUTANEOUS at 09:11

## 2024-09-02 RX ADMIN — IPRATROPIUM BROMIDE AND ALBUTEROL SULFATE 1 DOSE: 2.5; .5 SOLUTION RESPIRATORY (INHALATION) at 19:33

## 2024-09-02 RX ADMIN — CHLORTHALIDONE 25 MG: 25 TABLET ORAL at 09:07

## 2024-09-02 RX ADMIN — INSULIN GLARGINE 15 UNITS: 100 INJECTION, SOLUTION SUBCUTANEOUS at 09:12

## 2024-09-02 RX ADMIN — PIPERACILLIN AND TAZOBACTAM 4500 MG: 4; .5 INJECTION, POWDER, FOR SOLUTION INTRAVENOUS at 09:09

## 2024-09-02 RX ADMIN — FERROUS SULFATE TAB 325 MG (65 MG ELEMENTAL FE) 325 MG: 325 (65 FE) TAB at 09:10

## 2024-09-02 RX ADMIN — HYDRALAZINE HYDROCHLORIDE 100 MG: 50 TABLET ORAL at 14:25

## 2024-09-02 RX ADMIN — BUDESONIDE INHALATION 500 MCG: 0.5 SUSPENSION RESPIRATORY (INHALATION) at 19:32

## 2024-09-02 RX ADMIN — GABAPENTIN 600 MG: 300 CAPSULE ORAL at 14:25

## 2024-09-02 RX ADMIN — GABAPENTIN 600 MG: 300 CAPSULE ORAL at 19:35

## 2024-09-02 RX ADMIN — BUDESONIDE INHALATION 500 MCG: 0.5 SUSPENSION RESPIRATORY (INHALATION) at 08:08

## 2024-09-02 RX ADMIN — SODIUM CHLORIDE 15 MG/HR: 9 INJECTION, SOLUTION INTRAVENOUS at 02:59

## 2024-09-02 RX ADMIN — SODIUM CHLORIDE 10 MG/HR: 9 INJECTION, SOLUTION INTRAVENOUS at 05:02

## 2024-09-02 RX ADMIN — AZITHROMYCIN DIHYDRATE 500 MG: 250 TABLET ORAL at 09:09

## 2024-09-02 RX ADMIN — FERROUS SULFATE TAB 325 MG (65 MG ELEMENTAL FE) 325 MG: 325 (65 FE) TAB at 18:40

## 2024-09-02 RX ADMIN — BUMETANIDE 1 MG: 0.25 INJECTION INTRAMUSCULAR; INTRAVENOUS at 14:25

## 2024-09-02 RX ADMIN — DULOXETINE HYDROCHLORIDE 120 MG: 60 CAPSULE, DELAYED RELEASE ORAL at 09:18

## 2024-09-02 RX ADMIN — SODIUM CHLORIDE, PRESERVATIVE FREE 10 ML: 5 INJECTION INTRAVENOUS at 09:11

## 2024-09-02 RX ADMIN — MIRTAZAPINE 7.5 MG: 15 TABLET, FILM COATED ORAL at 19:35

## 2024-09-02 RX ADMIN — GABAPENTIN 600 MG: 300 CAPSULE ORAL at 09:10

## 2024-09-02 RX ADMIN — DOCUSATE SODIUM 100 MG: 100 CAPSULE, LIQUID FILLED ORAL at 09:09

## 2024-09-02 RX ADMIN — OXYCODONE HYDROCHLORIDE 2.5 MG: 5 TABLET ORAL at 14:35

## 2024-09-02 RX ADMIN — ARFORMOTEROL TARTRATE 15 MCG: 15 SOLUTION RESPIRATORY (INHALATION) at 19:33

## 2024-09-02 RX ADMIN — SENNOSIDES 8.6 MG: 8.6 TABLET, FILM COATED ORAL at 19:35

## 2024-09-02 RX ADMIN — CARVEDILOL 12.5 MG: 6.25 TABLET, FILM COATED ORAL at 09:10

## 2024-09-02 RX ADMIN — CLONIDINE HYDROCHLORIDE 0.1 MG: 0.1 TABLET ORAL at 19:35

## 2024-09-02 RX ADMIN — SODIUM CHLORIDE, PRESERVATIVE FREE 10 ML: 5 INJECTION INTRAVENOUS at 19:38

## 2024-09-02 RX ADMIN — HYDRALAZINE HYDROCHLORIDE 10 MG: 20 INJECTION INTRAMUSCULAR; INTRAVENOUS at 20:50

## 2024-09-02 ASSESSMENT — PAIN SCALES - GENERAL
PAINLEVEL_OUTOF10: 2
PAINLEVEL_OUTOF10: 10

## 2024-09-02 ASSESSMENT — PAIN DESCRIPTION - ORIENTATION: ORIENTATION: RIGHT

## 2024-09-02 ASSESSMENT — PAIN - FUNCTIONAL ASSESSMENT: PAIN_FUNCTIONAL_ASSESSMENT: ACTIVITIES ARE NOT PREVENTED

## 2024-09-02 ASSESSMENT — PAIN DESCRIPTION - DESCRIPTORS: DESCRIPTORS: SHARP;STABBING

## 2024-09-02 ASSESSMENT — PAIN DESCRIPTION - LOCATION: LOCATION: BACK;HIP

## 2024-09-03 ENCOUNTER — APPOINTMENT (OUTPATIENT)
Dept: GENERAL RADIOLOGY | Age: 69
DRG: 193 | End: 2024-09-03
Payer: MEDICARE

## 2024-09-03 ENCOUNTER — APPOINTMENT (OUTPATIENT)
Dept: ULTRASOUND IMAGING | Age: 69
DRG: 193 | End: 2024-09-03
Payer: MEDICARE

## 2024-09-03 LAB
ALBUMIN SERPL-MCNC: 2.8 G/DL (ref 3.5–4.7)
ALPHA1 GLOB SERPL ELPH-MCNC: 0.4 G/DL (ref 0.2–0.4)
ALPHA2 GLOB SERPL ELPH-MCNC: 0.8 G/DL (ref 0.5–1)
ANION GAP SERPL CALCULATED.3IONS-SCNC: 9 MMOL/L (ref 7–16)
B-GLOBULIN SERPL ELPH-MCNC: 0.9 G/DL (ref 0.8–1.3)
BNP SERPL-MCNC: 1251 PG/ML (ref 0–125)
BUN SERPL-MCNC: 39 MG/DL (ref 6–23)
CALCIUM SERPL-MCNC: 8.7 MG/DL (ref 8.6–10.2)
CHLORIDE SERPL-SCNC: 103 MMOL/L (ref 98–107)
CO2 SERPL-SCNC: 28 MMOL/L (ref 22–29)
CORTIS SERPL-MCNC: 7.1 UG/DL (ref 2.7–18.4)
CORTISOL COLLECTION INFO: NORMAL
CREAT SERPL-MCNC: 2.1 MG/DL (ref 0.7–1.2)
ERYTHROCYTE [DISTWIDTH] IN BLOOD BY AUTOMATED COUNT: 17.7 % (ref 11.5–15)
GAMMA GLOB SERPL ELPH-MCNC: 1.8 G/DL (ref 0.7–1.6)
GFR, ESTIMATED: 34 ML/MIN/1.73M2
GLUCOSE BLD-MCNC: 174 MG/DL (ref 74–99)
GLUCOSE BLD-MCNC: 176 MG/DL (ref 74–99)
GLUCOSE BLD-MCNC: 198 MG/DL (ref 74–99)
GLUCOSE BLD-MCNC: 236 MG/DL (ref 74–99)
GLUCOSE BLD-MCNC: 290 MG/DL (ref 74–99)
GLUCOSE SERPL-MCNC: 187 MG/DL (ref 74–99)
HCT VFR BLD AUTO: 30.6 % (ref 37–54)
HGB BLD-MCNC: 9.1 G/DL (ref 12.5–16.5)
MCH RBC QN AUTO: 24.3 PG (ref 26–35)
MCHC RBC AUTO-ENTMCNC: 29.7 G/DL (ref 32–34.5)
MCV RBC AUTO: 81.8 FL (ref 80–99.9)
PATHOLOGIST: ABNORMAL
PLATELET # BLD AUTO: 202 K/UL (ref 130–450)
PMV BLD AUTO: 10.9 FL (ref 7–12)
POTASSIUM SERPL-SCNC: 4.1 MMOL/L (ref 3.5–5)
PROT PATTERN SERPL ELPH-IMP: ABNORMAL
PROT SERPL-MCNC: 6.6 G/DL (ref 6.4–8.3)
RBC # BLD AUTO: 3.74 M/UL (ref 3.8–5.8)
SODIUM SERPL-SCNC: 140 MMOL/L (ref 132–146)
WBC OTHER # BLD: 11.7 K/UL (ref 4.5–11.5)

## 2024-09-03 PROCEDURE — 6370000000 HC RX 637 (ALT 250 FOR IP)

## 2024-09-03 PROCEDURE — 85027 COMPLETE CBC AUTOMATED: CPT

## 2024-09-03 PROCEDURE — 99232 SBSQ HOSP IP/OBS MODERATE 35: CPT | Performed by: INTERNAL MEDICINE

## 2024-09-03 PROCEDURE — 2700000000 HC OXYGEN THERAPY PER DAY

## 2024-09-03 PROCEDURE — 51798 US URINE CAPACITY MEASURE: CPT

## 2024-09-03 PROCEDURE — 6360000002 HC RX W HCPCS

## 2024-09-03 PROCEDURE — 2580000003 HC RX 258

## 2024-09-03 PROCEDURE — 6370000000 HC RX 637 (ALT 250 FOR IP): Performed by: INTERNAL MEDICINE

## 2024-09-03 PROCEDURE — 2060000000 HC ICU INTERMEDIATE R&B

## 2024-09-03 PROCEDURE — 94640 AIRWAY INHALATION TREATMENT: CPT

## 2024-09-03 PROCEDURE — 82962 GLUCOSE BLOOD TEST: CPT

## 2024-09-03 PROCEDURE — 82088 ASSAY OF ALDOSTERONE: CPT

## 2024-09-03 PROCEDURE — 80048 BASIC METABOLIC PNL TOTAL CA: CPT

## 2024-09-03 PROCEDURE — 83880 ASSAY OF NATRIURETIC PEPTIDE: CPT

## 2024-09-03 PROCEDURE — 82533 TOTAL CORTISOL: CPT

## 2024-09-03 PROCEDURE — 94660 CPAP INITIATION&MGMT: CPT

## 2024-09-03 PROCEDURE — 84244 ASSAY OF RENIN: CPT

## 2024-09-03 PROCEDURE — 83835 ASSAY OF METANEPHRINES: CPT

## 2024-09-03 PROCEDURE — 76770 US EXAM ABDO BACK WALL COMP: CPT

## 2024-09-03 PROCEDURE — 71045 X-RAY EXAM CHEST 1 VIEW: CPT

## 2024-09-03 PROCEDURE — 93975 VASCULAR STUDY: CPT

## 2024-09-03 RX ORDER — BUMETANIDE 0.25 MG/ML
1 INJECTION INTRAMUSCULAR; INTRAVENOUS ONCE
Status: COMPLETED | OUTPATIENT
Start: 2024-09-03 | End: 2024-09-03

## 2024-09-03 RX ORDER — CLONIDINE HYDROCHLORIDE 0.1 MG/1
0.1 TABLET ORAL 2 TIMES DAILY
Status: DISCONTINUED | OUTPATIENT
Start: 2024-09-03 | End: 2024-09-09 | Stop reason: HOSPADM

## 2024-09-03 RX ADMIN — BUDESONIDE INHALATION 500 MCG: 0.5 SUSPENSION RESPIRATORY (INHALATION) at 07:40

## 2024-09-03 RX ADMIN — CLONIDINE HYDROCHLORIDE 0.1 MG: 0.1 TABLET ORAL at 08:55

## 2024-09-03 RX ADMIN — FERROUS SULFATE TAB 325 MG (65 MG ELEMENTAL FE) 325 MG: 325 (65 FE) TAB at 17:11

## 2024-09-03 RX ADMIN — GABAPENTIN 600 MG: 300 CAPSULE ORAL at 13:29

## 2024-09-03 RX ADMIN — HEPARIN SODIUM 5000 UNITS: 5000 INJECTION INTRAVENOUS; SUBCUTANEOUS at 19:21

## 2024-09-03 RX ADMIN — GABAPENTIN 600 MG: 300 CAPSULE ORAL at 08:53

## 2024-09-03 RX ADMIN — SODIUM CHLORIDE, PRESERVATIVE FREE 10 ML: 5 INJECTION INTRAVENOUS at 09:03

## 2024-09-03 RX ADMIN — GABAPENTIN 600 MG: 300 CAPSULE ORAL at 20:00

## 2024-09-03 RX ADMIN — DOXAZOSIN 2 MG: 2 TABLET ORAL at 20:00

## 2024-09-03 RX ADMIN — ARFORMOTEROL TARTRATE 15 MCG: 15 SOLUTION RESPIRATORY (INHALATION) at 07:39

## 2024-09-03 RX ADMIN — CHLORTHALIDONE 25 MG: 25 TABLET ORAL at 08:55

## 2024-09-03 RX ADMIN — SENNOSIDES 8.6 MG: 8.6 TABLET, FILM COATED ORAL at 20:02

## 2024-09-03 RX ADMIN — HYDRALAZINE HYDROCHLORIDE 100 MG: 50 TABLET ORAL at 20:01

## 2024-09-03 RX ADMIN — AZITHROMYCIN DIHYDRATE 500 MG: 250 TABLET ORAL at 09:00

## 2024-09-03 RX ADMIN — INSULIN LISPRO 4 UNITS: 100 INJECTION, SOLUTION INTRAVENOUS; SUBCUTANEOUS at 17:25

## 2024-09-03 RX ADMIN — CLONIDINE HYDROCHLORIDE 0.1 MG: 0.1 TABLET ORAL at 20:01

## 2024-09-03 RX ADMIN — BUDESONIDE INHALATION 500 MCG: 0.5 SUSPENSION RESPIRATORY (INHALATION) at 21:46

## 2024-09-03 RX ADMIN — INSULIN GLARGINE 15 UNITS: 100 INJECTION, SOLUTION SUBCUTANEOUS at 09:02

## 2024-09-03 RX ADMIN — DOCUSATE SODIUM 100 MG: 100 CAPSULE, LIQUID FILLED ORAL at 09:00

## 2024-09-03 RX ADMIN — SODIUM CHLORIDE, PRESERVATIVE FREE 10 ML: 5 INJECTION INTRAVENOUS at 09:18

## 2024-09-03 RX ADMIN — IPRATROPIUM BROMIDE AND ALBUTEROL SULFATE 1 DOSE: 2.5; .5 SOLUTION RESPIRATORY (INHALATION) at 21:46

## 2024-09-03 RX ADMIN — AMLODIPINE BESYLATE 10 MG: 10 TABLET ORAL at 08:54

## 2024-09-03 RX ADMIN — FERROUS SULFATE TAB 325 MG (65 MG ELEMENTAL FE) 325 MG: 325 (65 FE) TAB at 09:00

## 2024-09-03 RX ADMIN — OXYCODONE HYDROCHLORIDE AND ACETAMINOPHEN 1 TABLET: 5; 325 TABLET ORAL at 20:03

## 2024-09-03 RX ADMIN — BUMETANIDE 1 MG: 0.25 INJECTION INTRAMUSCULAR; INTRAVENOUS at 09:01

## 2024-09-03 RX ADMIN — IPRATROPIUM BROMIDE AND ALBUTEROL SULFATE 1 DOSE: 2.5; .5 SOLUTION RESPIRATORY (INHALATION) at 13:43

## 2024-09-03 RX ADMIN — SODIUM CHLORIDE, PRESERVATIVE FREE 40 MG: 5 INJECTION INTRAVENOUS at 08:55

## 2024-09-03 RX ADMIN — DULOXETINE HYDROCHLORIDE 120 MG: 60 CAPSULE, DELAYED RELEASE ORAL at 09:00

## 2024-09-03 RX ADMIN — HYDRALAZINE HYDROCHLORIDE 100 MG: 50 TABLET ORAL at 13:29

## 2024-09-03 RX ADMIN — HYDRALAZINE HYDROCHLORIDE 10 MG: 20 INJECTION INTRAMUSCULAR; INTRAVENOUS at 17:11

## 2024-09-03 RX ADMIN — HEPARIN SODIUM 5000 UNITS: 5000 INJECTION INTRAVENOUS; SUBCUTANEOUS at 13:29

## 2024-09-03 RX ADMIN — IPRATROPIUM BROMIDE AND ALBUTEROL SULFATE 1 DOSE: 2.5; .5 SOLUTION RESPIRATORY (INHALATION) at 07:38

## 2024-09-03 RX ADMIN — HEPARIN SODIUM 5000 UNITS: 5000 INJECTION INTRAVENOUS; SUBCUTANEOUS at 02:42

## 2024-09-03 RX ADMIN — MIRTAZAPINE 7.5 MG: 15 TABLET, FILM COATED ORAL at 20:00

## 2024-09-03 RX ADMIN — SODIUM CHLORIDE, PRESERVATIVE FREE 10 ML: 5 INJECTION INTRAVENOUS at 20:06

## 2024-09-03 RX ADMIN — WATER 40 MG: 1 INJECTION INTRAMUSCULAR; INTRAVENOUS; SUBCUTANEOUS at 08:53

## 2024-09-03 RX ADMIN — ARFORMOTEROL TARTRATE 15 MCG: 15 SOLUTION RESPIRATORY (INHALATION) at 21:46

## 2024-09-03 RX ADMIN — HYDRALAZINE HYDROCHLORIDE 100 MG: 50 TABLET ORAL at 08:54

## 2024-09-03 ASSESSMENT — PAIN SCALES - GENERAL
PAINLEVEL_OUTOF10: 0
PAINLEVEL_OUTOF10: 0
PAINLEVEL_OUTOF10: 10

## 2024-09-03 ASSESSMENT — PAIN DESCRIPTION - DESCRIPTORS: DESCRIPTORS: ACHING;TINGLING

## 2024-09-03 ASSESSMENT — PAIN DESCRIPTION - LOCATION: LOCATION: BACK;LEG

## 2024-09-04 ENCOUNTER — APPOINTMENT (OUTPATIENT)
Dept: GENERAL RADIOLOGY | Age: 69
DRG: 193 | End: 2024-09-04
Payer: MEDICARE

## 2024-09-04 LAB
ANION GAP SERPL CALCULATED.3IONS-SCNC: 7 MMOL/L (ref 7–16)
BASOPHILS # BLD: 0.03 K/UL (ref 0–0.2)
BASOPHILS NFR BLD: 0 % (ref 0–2)
BUN SERPL-MCNC: 44 MG/DL (ref 6–23)
CALCIUM SERPL-MCNC: 8.8 MG/DL (ref 8.6–10.2)
CHLORIDE SERPL-SCNC: 101 MMOL/L (ref 98–107)
CO2 SERPL-SCNC: 29 MMOL/L (ref 22–29)
CREAT SERPL-MCNC: 1.9 MG/DL (ref 0.7–1.2)
EOSINOPHIL # BLD: 0.05 K/UL (ref 0.05–0.5)
EOSINOPHILS RELATIVE PERCENT: 0 % (ref 0–6)
ERYTHROCYTE [DISTWIDTH] IN BLOOD BY AUTOMATED COUNT: 17.4 % (ref 11.5–15)
GFR, ESTIMATED: 38 ML/MIN/1.73M2
GLUCOSE BLD-MCNC: 195 MG/DL (ref 74–99)
GLUCOSE BLD-MCNC: 221 MG/DL (ref 74–99)
GLUCOSE BLD-MCNC: 261 MG/DL (ref 74–99)
GLUCOSE SERPL-MCNC: 179 MG/DL (ref 74–99)
HCT VFR BLD AUTO: 31.5 % (ref 37–54)
HGB BLD-MCNC: 9.4 G/DL (ref 12.5–16.5)
IMM GRANULOCYTES # BLD AUTO: 0.12 K/UL (ref 0–0.58)
IMM GRANULOCYTES NFR BLD: 1 % (ref 0–5)
LYMPHOCYTES NFR BLD: 1.58 K/UL (ref 1.5–4)
LYMPHOCYTES RELATIVE PERCENT: 11 % (ref 20–42)
MAGNESIUM SERPL-MCNC: 2.1 MG/DL (ref 1.6–2.6)
MCH RBC QN AUTO: 24.2 PG (ref 26–35)
MCHC RBC AUTO-ENTMCNC: 29.8 G/DL (ref 32–34.5)
MCV RBC AUTO: 81.2 FL (ref 80–99.9)
MICROORGANISM SPEC CULT: NORMAL
MICROORGANISM SPEC CULT: NORMAL
MONOCYTES NFR BLD: 0.9 K/UL (ref 0.1–0.95)
MONOCYTES NFR BLD: 6 % (ref 2–12)
NEUTROPHILS NFR BLD: 82 % (ref 43–80)
NEUTS SEG NFR BLD: 12.17 K/UL (ref 1.8–7.3)
NON-GYN CYTOLOGY REPORT: NORMAL
PHOSPHATE SERPL-MCNC: 2.9 MG/DL (ref 2.5–4.5)
PLATELET # BLD AUTO: 200 K/UL (ref 130–450)
PMV BLD AUTO: 10.3 FL (ref 7–12)
POTASSIUM SERPL-SCNC: 4.5 MMOL/L (ref 3.5–5)
RBC # BLD AUTO: 3.88 M/UL (ref 3.8–5.8)
REPORT STATUS: NORMAL
REPORT STATUS: NORMAL
SERVICE CMNT-IMP: NORMAL
SERVICE CMNT-IMP: NORMAL
SODIUM SERPL-SCNC: 137 MMOL/L (ref 132–146)
SPECIMEN DESCRIPTION: NORMAL
SPECIMEN DESCRIPTION: NORMAL
WBC OTHER # BLD: 14.9 K/UL (ref 4.5–11.5)

## 2024-09-04 PROCEDURE — 6370000000 HC RX 637 (ALT 250 FOR IP): Performed by: INTERNAL MEDICINE

## 2024-09-04 PROCEDURE — 97530 THERAPEUTIC ACTIVITIES: CPT

## 2024-09-04 PROCEDURE — 2580000003 HC RX 258

## 2024-09-04 PROCEDURE — 85025 COMPLETE CBC W/AUTO DIFF WBC: CPT

## 2024-09-04 PROCEDURE — 6360000002 HC RX W HCPCS

## 2024-09-04 PROCEDURE — 6370000000 HC RX 637 (ALT 250 FOR IP)

## 2024-09-04 PROCEDURE — 71045 X-RAY EXAM CHEST 1 VIEW: CPT

## 2024-09-04 PROCEDURE — 82962 GLUCOSE BLOOD TEST: CPT

## 2024-09-04 PROCEDURE — 99231 SBSQ HOSP IP/OBS SF/LOW 25: CPT | Performed by: INTERNAL MEDICINE

## 2024-09-04 PROCEDURE — 94640 AIRWAY INHALATION TREATMENT: CPT

## 2024-09-04 PROCEDURE — 2060000000 HC ICU INTERMEDIATE R&B

## 2024-09-04 PROCEDURE — 99291 CRITICAL CARE FIRST HOUR: CPT | Performed by: INTERNAL MEDICINE

## 2024-09-04 PROCEDURE — 97162 PT EVAL MOD COMPLEX 30 MIN: CPT

## 2024-09-04 PROCEDURE — 84100 ASSAY OF PHOSPHORUS: CPT

## 2024-09-04 PROCEDURE — 2700000000 HC OXYGEN THERAPY PER DAY

## 2024-09-04 PROCEDURE — 80048 BASIC METABOLIC PNL TOTAL CA: CPT

## 2024-09-04 PROCEDURE — 94660 CPAP INITIATION&MGMT: CPT

## 2024-09-04 PROCEDURE — 97166 OT EVAL MOD COMPLEX 45 MIN: CPT

## 2024-09-04 PROCEDURE — 83735 ASSAY OF MAGNESIUM: CPT

## 2024-09-04 RX ORDER — CARVEDILOL 6.25 MG/1
6.25 TABLET ORAL 2 TIMES DAILY WITH MEALS
Status: DISCONTINUED | OUTPATIENT
Start: 2024-09-04 | End: 2024-09-09 | Stop reason: HOSPADM

## 2024-09-04 RX ADMIN — GABAPENTIN 600 MG: 300 CAPSULE ORAL at 08:41

## 2024-09-04 RX ADMIN — SENNOSIDES 8.6 MG: 8.6 TABLET, FILM COATED ORAL at 19:42

## 2024-09-04 RX ADMIN — IPRATROPIUM BROMIDE AND ALBUTEROL SULFATE 1 DOSE: 2.5; .5 SOLUTION RESPIRATORY (INHALATION) at 19:47

## 2024-09-04 RX ADMIN — MIRTAZAPINE 7.5 MG: 15 TABLET, FILM COATED ORAL at 19:41

## 2024-09-04 RX ADMIN — CLONIDINE HYDROCHLORIDE 0.1 MG: 0.1 TABLET ORAL at 08:42

## 2024-09-04 RX ADMIN — BUDESONIDE INHALATION 500 MCG: 0.5 SUSPENSION RESPIRATORY (INHALATION) at 09:36

## 2024-09-04 RX ADMIN — DOXAZOSIN 2 MG: 2 TABLET ORAL at 20:35

## 2024-09-04 RX ADMIN — HYDRALAZINE HYDROCHLORIDE 100 MG: 50 TABLET ORAL at 19:42

## 2024-09-04 RX ADMIN — FERROUS SULFATE TAB 325 MG (65 MG ELEMENTAL FE) 325 MG: 325 (65 FE) TAB at 17:50

## 2024-09-04 RX ADMIN — DULOXETINE HYDROCHLORIDE 120 MG: 60 CAPSULE, DELAYED RELEASE ORAL at 08:42

## 2024-09-04 RX ADMIN — SODIUM CHLORIDE, PRESERVATIVE FREE 10 ML: 5 INJECTION INTRAVENOUS at 19:43

## 2024-09-04 RX ADMIN — IPRATROPIUM BROMIDE AND ALBUTEROL SULFATE 1 DOSE: 2.5; .5 SOLUTION RESPIRATORY (INHALATION) at 09:36

## 2024-09-04 RX ADMIN — IPRATROPIUM BROMIDE AND ALBUTEROL SULFATE 1 DOSE: 2.5; .5 SOLUTION RESPIRATORY (INHALATION) at 12:42

## 2024-09-04 RX ADMIN — HEPARIN SODIUM 5000 UNITS: 5000 INJECTION INTRAVENOUS; SUBCUTANEOUS at 17:51

## 2024-09-04 RX ADMIN — AZITHROMYCIN DIHYDRATE 500 MG: 250 TABLET ORAL at 08:41

## 2024-09-04 RX ADMIN — ARFORMOTEROL TARTRATE 15 MCG: 15 SOLUTION RESPIRATORY (INHALATION) at 19:47

## 2024-09-04 RX ADMIN — IPRATROPIUM BROMIDE AND ALBUTEROL SULFATE 1 DOSE: 2.5; .5 SOLUTION RESPIRATORY (INHALATION) at 16:04

## 2024-09-04 RX ADMIN — DOCUSATE SODIUM 100 MG: 100 CAPSULE, LIQUID FILLED ORAL at 08:42

## 2024-09-04 RX ADMIN — CARVEDILOL 6.25 MG: 6.25 TABLET, FILM COATED ORAL at 17:50

## 2024-09-04 RX ADMIN — FERROUS SULFATE TAB 325 MG (65 MG ELEMENTAL FE) 325 MG: 325 (65 FE) TAB at 08:42

## 2024-09-04 RX ADMIN — GABAPENTIN 600 MG: 300 CAPSULE ORAL at 12:55

## 2024-09-04 RX ADMIN — AMLODIPINE BESYLATE 10 MG: 10 TABLET ORAL at 08:42

## 2024-09-04 RX ADMIN — INSULIN LISPRO 4 UNITS: 100 INJECTION, SOLUTION INTRAVENOUS; SUBCUTANEOUS at 17:50

## 2024-09-04 RX ADMIN — HEPARIN SODIUM 5000 UNITS: 5000 INJECTION INTRAVENOUS; SUBCUTANEOUS at 11:03

## 2024-09-04 RX ADMIN — WATER 40 MG: 1 INJECTION INTRAMUSCULAR; INTRAVENOUS; SUBCUTANEOUS at 08:42

## 2024-09-04 RX ADMIN — HYDRALAZINE HYDROCHLORIDE 10 MG: 20 INJECTION INTRAMUSCULAR; INTRAVENOUS at 00:12

## 2024-09-04 RX ADMIN — HYDRALAZINE HYDROCHLORIDE 100 MG: 50 TABLET ORAL at 08:42

## 2024-09-04 RX ADMIN — OXYCODONE HYDROCHLORIDE AND ACETAMINOPHEN 1 TABLET: 5; 325 TABLET ORAL at 08:47

## 2024-09-04 RX ADMIN — OXYCODONE HYDROCHLORIDE AND ACETAMINOPHEN 1 TABLET: 5; 325 TABLET ORAL at 20:11

## 2024-09-04 RX ADMIN — CHLORTHALIDONE 25 MG: 25 TABLET ORAL at 08:42

## 2024-09-04 RX ADMIN — INSULIN GLARGINE 15 UNITS: 100 INJECTION, SOLUTION SUBCUTANEOUS at 08:41

## 2024-09-04 RX ADMIN — HEPARIN SODIUM 5000 UNITS: 5000 INJECTION INTRAVENOUS; SUBCUTANEOUS at 02:30

## 2024-09-04 RX ADMIN — INSULIN LISPRO 2 UNITS: 100 INJECTION, SOLUTION INTRAVENOUS; SUBCUTANEOUS at 12:55

## 2024-09-04 RX ADMIN — BUDESONIDE INHALATION 500 MCG: 0.5 SUSPENSION RESPIRATORY (INHALATION) at 19:47

## 2024-09-04 RX ADMIN — CLONIDINE HYDROCHLORIDE 0.1 MG: 0.1 TABLET ORAL at 19:40

## 2024-09-04 RX ADMIN — ARFORMOTEROL TARTRATE 15 MCG: 15 SOLUTION RESPIRATORY (INHALATION) at 09:36

## 2024-09-04 RX ADMIN — HYDRALAZINE HYDROCHLORIDE 100 MG: 50 TABLET ORAL at 12:55

## 2024-09-04 RX ADMIN — HYDRALAZINE HYDROCHLORIDE 10 MG: 20 INJECTION INTRAMUSCULAR; INTRAVENOUS at 05:15

## 2024-09-04 RX ADMIN — GABAPENTIN 600 MG: 300 CAPSULE ORAL at 20:11

## 2024-09-04 ASSESSMENT — PAIN DESCRIPTION - ORIENTATION
ORIENTATION: LOWER
ORIENTATION: LEFT

## 2024-09-04 ASSESSMENT — PAIN DESCRIPTION - DESCRIPTORS
DESCRIPTORS: ACHING;THROBBING;TINGLING
DESCRIPTORS: ACHING;DISCOMFORT

## 2024-09-04 ASSESSMENT — PAIN SCALES - GENERAL
PAINLEVEL_OUTOF10: 10
PAINLEVEL_OUTOF10: 0
PAINLEVEL_OUTOF10: 10
PAINLEVEL_OUTOF10: 0

## 2024-09-04 ASSESSMENT — PAIN DESCRIPTION - LOCATION
LOCATION: BACK;LEG
LOCATION: BACK

## 2024-09-05 LAB
ALDOST SERPL-MCNC: 11.1 NG/DL
ALDOSTERONE COMMENT: NORMAL
ANION GAP SERPL CALCULATED.3IONS-SCNC: 10 MMOL/L (ref 7–16)
BUN SERPL-MCNC: 42 MG/DL (ref 6–23)
CALCIUM SERPL-MCNC: 9 MG/DL (ref 8.6–10.2)
CHLORIDE SERPL-SCNC: 100 MMOL/L (ref 98–107)
CO2 SERPL-SCNC: 28 MMOL/L (ref 22–29)
CREAT SERPL-MCNC: 1.7 MG/DL (ref 0.7–1.2)
ERYTHROCYTE [DISTWIDTH] IN BLOOD BY AUTOMATED COUNT: 17.5 % (ref 11.5–15)
GFR, ESTIMATED: 45 ML/MIN/1.73M2
GLUCOSE BLD-MCNC: 138 MG/DL (ref 74–99)
GLUCOSE BLD-MCNC: 208 MG/DL (ref 74–99)
GLUCOSE BLD-MCNC: 211 MG/DL (ref 74–99)
GLUCOSE BLD-MCNC: 213 MG/DL (ref 74–99)
GLUCOSE SERPL-MCNC: 182 MG/DL (ref 74–99)
HCT VFR BLD AUTO: 30.8 % (ref 37–54)
HGB BLD-MCNC: 9.4 G/DL (ref 12.5–16.5)
MCH RBC QN AUTO: 24.6 PG (ref 26–35)
MCHC RBC AUTO-ENTMCNC: 30.5 G/DL (ref 32–34.5)
MCV RBC AUTO: 80.6 FL (ref 80–99.9)
METANEPH/PLASMA INTERP: NORMAL
METANEPHRINE: 0.11 NMOL/L (ref 0–0.49)
NORMETANEPHRINE PLASMA: 0.17 NMOL/L (ref 0–0.89)
PLATELET # BLD AUTO: 218 K/UL (ref 130–450)
PMV BLD AUTO: 11.1 FL (ref 7–12)
POTASSIUM SERPL-SCNC: 4.1 MMOL/L (ref 3.5–5)
RBC # BLD AUTO: 3.82 M/UL (ref 3.8–5.8)
SODIUM SERPL-SCNC: 138 MMOL/L (ref 132–146)
WBC OTHER # BLD: 13.9 K/UL (ref 4.5–11.5)

## 2024-09-05 PROCEDURE — 6370000000 HC RX 637 (ALT 250 FOR IP)

## 2024-09-05 PROCEDURE — 6360000002 HC RX W HCPCS

## 2024-09-05 PROCEDURE — 36415 COLL VENOUS BLD VENIPUNCTURE: CPT

## 2024-09-05 PROCEDURE — 6370000000 HC RX 637 (ALT 250 FOR IP): Performed by: INTERNAL MEDICINE

## 2024-09-05 PROCEDURE — 82962 GLUCOSE BLOOD TEST: CPT

## 2024-09-05 PROCEDURE — 2500000003 HC RX 250 WO HCPCS: Performed by: INTERNAL MEDICINE

## 2024-09-05 PROCEDURE — 99232 SBSQ HOSP IP/OBS MODERATE 35: CPT | Performed by: INTERNAL MEDICINE

## 2024-09-05 PROCEDURE — 2580000003 HC RX 258

## 2024-09-05 PROCEDURE — 94640 AIRWAY INHALATION TREATMENT: CPT

## 2024-09-05 PROCEDURE — 2700000000 HC OXYGEN THERAPY PER DAY

## 2024-09-05 PROCEDURE — 80048 BASIC METABOLIC PNL TOTAL CA: CPT

## 2024-09-05 PROCEDURE — 94660 CPAP INITIATION&MGMT: CPT

## 2024-09-05 PROCEDURE — 2140000000 HC CCU INTERMEDIATE R&B

## 2024-09-05 PROCEDURE — 85027 COMPLETE CBC AUTOMATED: CPT

## 2024-09-05 RX ADMIN — INSULIN GLARGINE 15 UNITS: 100 INJECTION, SOLUTION SUBCUTANEOUS at 08:27

## 2024-09-05 RX ADMIN — GABAPENTIN 600 MG: 300 CAPSULE ORAL at 08:24

## 2024-09-05 RX ADMIN — HEPARIN SODIUM 5000 UNITS: 5000 INJECTION INTRAVENOUS; SUBCUTANEOUS at 11:08

## 2024-09-05 RX ADMIN — HEPARIN SODIUM 5000 UNITS: 5000 INJECTION INTRAVENOUS; SUBCUTANEOUS at 03:07

## 2024-09-05 RX ADMIN — CLONIDINE HYDROCHLORIDE 0.1 MG: 0.1 TABLET ORAL at 20:52

## 2024-09-05 RX ADMIN — SODIUM CHLORIDE, PRESERVATIVE FREE 10 ML: 5 INJECTION INTRAVENOUS at 08:30

## 2024-09-05 RX ADMIN — INSULIN LISPRO 2 UNITS: 100 INJECTION, SOLUTION INTRAVENOUS; SUBCUTANEOUS at 08:27

## 2024-09-05 RX ADMIN — AMLODIPINE BESYLATE 10 MG: 10 TABLET ORAL at 08:24

## 2024-09-05 RX ADMIN — CHLORTHALIDONE 25 MG: 25 TABLET ORAL at 08:30

## 2024-09-05 RX ADMIN — DOXAZOSIN 2 MG: 2 TABLET ORAL at 20:52

## 2024-09-05 RX ADMIN — HEPARIN SODIUM 5000 UNITS: 5000 INJECTION INTRAVENOUS; SUBCUTANEOUS at 17:15

## 2024-09-05 RX ADMIN — IPRATROPIUM BROMIDE AND ALBUTEROL SULFATE 1 DOSE: 2.5; .5 SOLUTION RESPIRATORY (INHALATION) at 08:48

## 2024-09-05 RX ADMIN — FERROUS SULFATE TAB 325 MG (65 MG ELEMENTAL FE) 325 MG: 325 (65 FE) TAB at 17:15

## 2024-09-05 RX ADMIN — IPRATROPIUM BROMIDE AND ALBUTEROL SULFATE 1 DOSE: 2.5; .5 SOLUTION RESPIRATORY (INHALATION) at 16:26

## 2024-09-05 RX ADMIN — HYDRALAZINE HYDROCHLORIDE 100 MG: 50 TABLET ORAL at 08:23

## 2024-09-05 RX ADMIN — DULOXETINE HYDROCHLORIDE 120 MG: 60 CAPSULE, DELAYED RELEASE ORAL at 08:29

## 2024-09-05 RX ADMIN — SODIUM CHLORIDE, PRESERVATIVE FREE 10 ML: 5 INJECTION INTRAVENOUS at 20:52

## 2024-09-05 RX ADMIN — CARVEDILOL 6.25 MG: 6.25 TABLET, FILM COATED ORAL at 08:24

## 2024-09-05 RX ADMIN — ARFORMOTEROL TARTRATE 15 MCG: 15 SOLUTION RESPIRATORY (INHALATION) at 08:48

## 2024-09-05 RX ADMIN — ARFORMOTEROL TARTRATE 15 MCG: 15 SOLUTION RESPIRATORY (INHALATION) at 20:00

## 2024-09-05 RX ADMIN — PETROLATUM: 420 OINTMENT TOPICAL at 17:17

## 2024-09-05 RX ADMIN — MIRTAZAPINE 7.5 MG: 15 TABLET, FILM COATED ORAL at 20:52

## 2024-09-05 RX ADMIN — INSULIN LISPRO 2 UNITS: 100 INJECTION, SOLUTION INTRAVENOUS; SUBCUTANEOUS at 12:25

## 2024-09-05 RX ADMIN — IPRATROPIUM BROMIDE AND ALBUTEROL SULFATE 1 DOSE: 2.5; .5 SOLUTION RESPIRATORY (INHALATION) at 12:03

## 2024-09-05 RX ADMIN — CLONIDINE HYDROCHLORIDE 0.1 MG: 0.1 TABLET ORAL at 08:25

## 2024-09-05 RX ADMIN — SENNOSIDES 8.6 MG: 8.6 TABLET, FILM COATED ORAL at 20:52

## 2024-09-05 RX ADMIN — HYDRALAZINE HYDROCHLORIDE 100 MG: 50 TABLET ORAL at 15:01

## 2024-09-05 RX ADMIN — BUDESONIDE INHALATION 500 MCG: 0.5 SUSPENSION RESPIRATORY (INHALATION) at 08:48

## 2024-09-05 RX ADMIN — POLYETHYLENE GLYCOL 3350 17 G: 17 POWDER, FOR SOLUTION ORAL at 08:24

## 2024-09-05 RX ADMIN — BUDESONIDE INHALATION 500 MCG: 0.5 SUSPENSION RESPIRATORY (INHALATION) at 20:00

## 2024-09-05 RX ADMIN — CARVEDILOL 6.25 MG: 6.25 TABLET, FILM COATED ORAL at 17:14

## 2024-09-05 RX ADMIN — OXYCODONE HYDROCHLORIDE AND ACETAMINOPHEN 1 TABLET: 5; 325 TABLET ORAL at 12:57

## 2024-09-05 RX ADMIN — HYDRALAZINE HYDROCHLORIDE 100 MG: 50 TABLET ORAL at 20:52

## 2024-09-05 RX ADMIN — MICONAZOLE NITRATE: 20.6 POWDER TOPICAL at 11:12

## 2024-09-05 RX ADMIN — IPRATROPIUM BROMIDE AND ALBUTEROL SULFATE 1 DOSE: 2.5; .5 SOLUTION RESPIRATORY (INHALATION) at 20:00

## 2024-09-05 RX ADMIN — MICONAZOLE NITRATE: 20.6 POWDER TOPICAL at 20:53

## 2024-09-05 RX ADMIN — GABAPENTIN 600 MG: 300 CAPSULE ORAL at 15:01

## 2024-09-05 RX ADMIN — FERROUS SULFATE TAB 325 MG (65 MG ELEMENTAL FE) 325 MG: 325 (65 FE) TAB at 08:25

## 2024-09-05 RX ADMIN — PETROLATUM: 420 OINTMENT TOPICAL at 11:13

## 2024-09-05 RX ADMIN — GABAPENTIN 600 MG: 300 CAPSULE ORAL at 20:52

## 2024-09-05 RX ADMIN — DOCUSATE SODIUM 100 MG: 100 CAPSULE, LIQUID FILLED ORAL at 08:24

## 2024-09-05 ASSESSMENT — PAIN DESCRIPTION - DESCRIPTORS: DESCRIPTORS: ACHING;DISCOMFORT;SORE

## 2024-09-05 ASSESSMENT — PAIN DESCRIPTION - LOCATION: LOCATION: HIP;BACK

## 2024-09-05 ASSESSMENT — PAIN DESCRIPTION - ORIENTATION: ORIENTATION: RIGHT

## 2024-09-05 ASSESSMENT — PAIN SCALES - WONG BAKER: WONGBAKER_NUMERICALRESPONSE: HURTS A LITTLE BIT

## 2024-09-05 ASSESSMENT — PAIN SCALES - GENERAL
PAINLEVEL_OUTOF10: 6
PAINLEVEL_OUTOF10: 9

## 2024-09-06 LAB
ANION GAP SERPL CALCULATED.3IONS-SCNC: 8 MMOL/L (ref 7–16)
BNP SERPL-MCNC: 845 PG/ML (ref 0–125)
BUN SERPL-MCNC: 39 MG/DL (ref 6–23)
CALCIUM SERPL-MCNC: 9 MG/DL (ref 8.6–10.2)
CHLORIDE SERPL-SCNC: 104 MMOL/L (ref 98–107)
CO2 SERPL-SCNC: 29 MMOL/L (ref 22–29)
CREAT SERPL-MCNC: 1.7 MG/DL (ref 0.7–1.2)
GFR, ESTIMATED: 43 ML/MIN/1.73M2
GLUCOSE BLD-MCNC: 145 MG/DL (ref 74–99)
GLUCOSE BLD-MCNC: 148 MG/DL (ref 74–99)
GLUCOSE BLD-MCNC: 178 MG/DL (ref 74–99)
GLUCOSE BLD-MCNC: 208 MG/DL (ref 74–99)
GLUCOSE SERPL-MCNC: 119 MG/DL (ref 74–99)
POTASSIUM SERPL-SCNC: 4.4 MMOL/L (ref 3.5–5)
RENIN COMMENT: NORMAL
RENIN PLAS-CCNC: 6.7 NG/ML/HR
SODIUM SERPL-SCNC: 141 MMOL/L (ref 132–146)

## 2024-09-06 PROCEDURE — 82962 GLUCOSE BLOOD TEST: CPT

## 2024-09-06 PROCEDURE — 6370000000 HC RX 637 (ALT 250 FOR IP)

## 2024-09-06 PROCEDURE — 36415 COLL VENOUS BLD VENIPUNCTURE: CPT

## 2024-09-06 PROCEDURE — 2580000003 HC RX 258

## 2024-09-06 PROCEDURE — 80048 BASIC METABOLIC PNL TOTAL CA: CPT

## 2024-09-06 PROCEDURE — 6360000002 HC RX W HCPCS

## 2024-09-06 PROCEDURE — 83880 ASSAY OF NATRIURETIC PEPTIDE: CPT

## 2024-09-06 PROCEDURE — 2140000000 HC CCU INTERMEDIATE R&B

## 2024-09-06 PROCEDURE — 99232 SBSQ HOSP IP/OBS MODERATE 35: CPT | Performed by: INTERNAL MEDICINE

## 2024-09-06 PROCEDURE — 94660 CPAP INITIATION&MGMT: CPT

## 2024-09-06 PROCEDURE — 94640 AIRWAY INHALATION TREATMENT: CPT

## 2024-09-06 PROCEDURE — 2700000000 HC OXYGEN THERAPY PER DAY

## 2024-09-06 PROCEDURE — 6370000000 HC RX 637 (ALT 250 FOR IP): Performed by: INTERNAL MEDICINE

## 2024-09-06 RX ADMIN — SODIUM CHLORIDE, PRESERVATIVE FREE 10 ML: 5 INJECTION INTRAVENOUS at 08:07

## 2024-09-06 RX ADMIN — HEPARIN SODIUM 5000 UNITS: 5000 INJECTION INTRAVENOUS; SUBCUTANEOUS at 02:51

## 2024-09-06 RX ADMIN — DULOXETINE HYDROCHLORIDE 120 MG: 60 CAPSULE, DELAYED RELEASE ORAL at 08:05

## 2024-09-06 RX ADMIN — OXYCODONE HYDROCHLORIDE AND ACETAMINOPHEN 1 TABLET: 5; 325 TABLET ORAL at 08:08

## 2024-09-06 RX ADMIN — BUDESONIDE INHALATION 500 MCG: 0.5 SUSPENSION RESPIRATORY (INHALATION) at 09:20

## 2024-09-06 RX ADMIN — BUDESONIDE INHALATION 500 MCG: 0.5 SUSPENSION RESPIRATORY (INHALATION) at 18:20

## 2024-09-06 RX ADMIN — IPRATROPIUM BROMIDE AND ALBUTEROL SULFATE 1 DOSE: 2.5; .5 SOLUTION RESPIRATORY (INHALATION) at 15:49

## 2024-09-06 RX ADMIN — CARVEDILOL 6.25 MG: 6.25 TABLET, FILM COATED ORAL at 17:06

## 2024-09-06 RX ADMIN — FERROUS SULFATE TAB 325 MG (65 MG ELEMENTAL FE) 325 MG: 325 (65 FE) TAB at 08:05

## 2024-09-06 RX ADMIN — ARFORMOTEROL TARTRATE 15 MCG: 15 SOLUTION RESPIRATORY (INHALATION) at 18:20

## 2024-09-06 RX ADMIN — ARFORMOTEROL TARTRATE 15 MCG: 15 SOLUTION RESPIRATORY (INHALATION) at 09:20

## 2024-09-06 RX ADMIN — CLONIDINE HYDROCHLORIDE 0.1 MG: 0.1 TABLET ORAL at 20:12

## 2024-09-06 RX ADMIN — IPRATROPIUM BROMIDE AND ALBUTEROL SULFATE 1 DOSE: 2.5; .5 SOLUTION RESPIRATORY (INHALATION) at 18:20

## 2024-09-06 RX ADMIN — CARVEDILOL 6.25 MG: 6.25 TABLET, FILM COATED ORAL at 08:06

## 2024-09-06 RX ADMIN — INSULIN GLARGINE 15 UNITS: 100 INJECTION, SOLUTION SUBCUTANEOUS at 08:06

## 2024-09-06 RX ADMIN — DOXAZOSIN 2 MG: 2 TABLET ORAL at 20:11

## 2024-09-06 RX ADMIN — GABAPENTIN 600 MG: 300 CAPSULE ORAL at 20:16

## 2024-09-06 RX ADMIN — HYDRALAZINE HYDROCHLORIDE 100 MG: 50 TABLET ORAL at 14:38

## 2024-09-06 RX ADMIN — MICONAZOLE NITRATE: 20.6 POWDER TOPICAL at 08:09

## 2024-09-06 RX ADMIN — PETROLATUM: 420 OINTMENT TOPICAL at 18:40

## 2024-09-06 RX ADMIN — CHLORTHALIDONE 25 MG: 25 TABLET ORAL at 08:06

## 2024-09-06 RX ADMIN — HEPARIN SODIUM 5000 UNITS: 5000 INJECTION INTRAVENOUS; SUBCUTANEOUS at 18:40

## 2024-09-06 RX ADMIN — AMLODIPINE BESYLATE 10 MG: 10 TABLET ORAL at 08:06

## 2024-09-06 RX ADMIN — IPRATROPIUM BROMIDE AND ALBUTEROL SULFATE 1 DOSE: 2.5; .5 SOLUTION RESPIRATORY (INHALATION) at 09:20

## 2024-09-06 RX ADMIN — MIRTAZAPINE 7.5 MG: 15 TABLET, FILM COATED ORAL at 20:12

## 2024-09-06 RX ADMIN — INSULIN LISPRO 2 UNITS: 100 INJECTION, SOLUTION INTRAVENOUS; SUBCUTANEOUS at 12:00

## 2024-09-06 RX ADMIN — PETROLATUM: 420 OINTMENT TOPICAL at 08:09

## 2024-09-06 RX ADMIN — GABAPENTIN 600 MG: 300 CAPSULE ORAL at 14:38

## 2024-09-06 RX ADMIN — HEPARIN SODIUM 5000 UNITS: 5000 INJECTION INTRAVENOUS; SUBCUTANEOUS at 12:00

## 2024-09-06 RX ADMIN — CLONIDINE HYDROCHLORIDE 0.1 MG: 0.1 TABLET ORAL at 08:05

## 2024-09-06 RX ADMIN — IPRATROPIUM BROMIDE AND ALBUTEROL SULFATE 1 DOSE: 2.5; .5 SOLUTION RESPIRATORY (INHALATION) at 13:15

## 2024-09-06 RX ADMIN — HYDRALAZINE HYDROCHLORIDE 100 MG: 50 TABLET ORAL at 08:06

## 2024-09-06 RX ADMIN — HYDRALAZINE HYDROCHLORIDE 100 MG: 50 TABLET ORAL at 20:11

## 2024-09-06 RX ADMIN — GABAPENTIN 600 MG: 300 CAPSULE ORAL at 08:05

## 2024-09-06 RX ADMIN — FERROUS SULFATE TAB 325 MG (65 MG ELEMENTAL FE) 325 MG: 325 (65 FE) TAB at 17:06

## 2024-09-06 RX ADMIN — SODIUM CHLORIDE, PRESERVATIVE FREE 10 ML: 5 INJECTION INTRAVENOUS at 20:12

## 2024-09-06 ASSESSMENT — PAIN DESCRIPTION - LOCATION: LOCATION: BACK;HIP

## 2024-09-06 ASSESSMENT — PAIN DESCRIPTION - DESCRIPTORS: DESCRIPTORS: ACHING;DISCOMFORT;SORE

## 2024-09-06 ASSESSMENT — PAIN SCALES - GENERAL: PAINLEVEL_OUTOF10: 10

## 2024-09-06 ASSESSMENT — PAIN DESCRIPTION - ORIENTATION: ORIENTATION: LOWER;RIGHT

## 2024-09-07 ENCOUNTER — APPOINTMENT (OUTPATIENT)
Dept: MRI IMAGING | Age: 69
DRG: 193 | End: 2024-09-07
Payer: MEDICARE

## 2024-09-07 LAB
ANION GAP SERPL CALCULATED.3IONS-SCNC: 9 MMOL/L (ref 7–16)
BUN SERPL-MCNC: 37 MG/DL (ref 6–23)
CALCIUM SERPL-MCNC: 9 MG/DL (ref 8.6–10.2)
CHLORIDE SERPL-SCNC: 103 MMOL/L (ref 98–107)
CO2 SERPL-SCNC: 28 MMOL/L (ref 22–29)
CREAT SERPL-MCNC: 1.7 MG/DL (ref 0.7–1.2)
ERYTHROCYTE [DISTWIDTH] IN BLOOD BY AUTOMATED COUNT: 17.7 % (ref 11.5–15)
GFR, ESTIMATED: 44 ML/MIN/1.73M2
GLUCOSE BLD-MCNC: 117 MG/DL (ref 74–99)
GLUCOSE BLD-MCNC: 173 MG/DL (ref 74–99)
GLUCOSE BLD-MCNC: 211 MG/DL (ref 74–99)
GLUCOSE BLD-MCNC: 89 MG/DL (ref 74–99)
GLUCOSE SERPL-MCNC: 134 MG/DL (ref 74–99)
HCT VFR BLD AUTO: 34 % (ref 37–54)
HGB BLD-MCNC: 10.1 G/DL (ref 12.5–16.5)
MCH RBC QN AUTO: 24.3 PG (ref 26–35)
MCHC RBC AUTO-ENTMCNC: 29.7 G/DL (ref 32–34.5)
MCV RBC AUTO: 81.7 FL (ref 80–99.9)
PLATELET # BLD AUTO: 242 K/UL (ref 130–450)
PMV BLD AUTO: 11.7 FL (ref 7–12)
POTASSIUM SERPL-SCNC: 4.4 MMOL/L (ref 3.5–5)
RBC # BLD AUTO: 4.16 M/UL (ref 3.8–5.8)
SODIUM SERPL-SCNC: 140 MMOL/L (ref 132–146)
WBC OTHER # BLD: 11.4 K/UL (ref 4.5–11.5)

## 2024-09-07 PROCEDURE — 6370000000 HC RX 637 (ALT 250 FOR IP)

## 2024-09-07 PROCEDURE — A9579 GAD-BASE MR CONTRAST NOS,1ML: HCPCS | Performed by: RADIOLOGY

## 2024-09-07 PROCEDURE — 6360000002 HC RX W HCPCS

## 2024-09-07 PROCEDURE — 99232 SBSQ HOSP IP/OBS MODERATE 35: CPT | Performed by: INTERNAL MEDICINE

## 2024-09-07 PROCEDURE — 94640 AIRWAY INHALATION TREATMENT: CPT

## 2024-09-07 PROCEDURE — 2700000000 HC OXYGEN THERAPY PER DAY

## 2024-09-07 PROCEDURE — 2140000000 HC CCU INTERMEDIATE R&B

## 2024-09-07 PROCEDURE — 94660 CPAP INITIATION&MGMT: CPT

## 2024-09-07 PROCEDURE — 36415 COLL VENOUS BLD VENIPUNCTURE: CPT

## 2024-09-07 PROCEDURE — 2580000003 HC RX 258

## 2024-09-07 PROCEDURE — C8900 MRA W/CONT, ABD: HCPCS

## 2024-09-07 PROCEDURE — 80048 BASIC METABOLIC PNL TOTAL CA: CPT

## 2024-09-07 PROCEDURE — 82962 GLUCOSE BLOOD TEST: CPT

## 2024-09-07 PROCEDURE — 6370000000 HC RX 637 (ALT 250 FOR IP): Performed by: INTERNAL MEDICINE

## 2024-09-07 PROCEDURE — 85027 COMPLETE CBC AUTOMATED: CPT

## 2024-09-07 PROCEDURE — 6360000004 HC RX CONTRAST MEDICATION: Performed by: RADIOLOGY

## 2024-09-07 RX ADMIN — GADOTERIDOL 20 ML: 279.3 INJECTION, SOLUTION INTRAVENOUS at 14:16

## 2024-09-07 RX ADMIN — MIRTAZAPINE 7.5 MG: 15 TABLET, FILM COATED ORAL at 21:33

## 2024-09-07 RX ADMIN — CLONIDINE HYDROCHLORIDE 0.1 MG: 0.1 TABLET ORAL at 21:32

## 2024-09-07 RX ADMIN — PETROLATUM: 420 OINTMENT TOPICAL at 17:41

## 2024-09-07 RX ADMIN — SODIUM CHLORIDE, PRESERVATIVE FREE 10 ML: 5 INJECTION INTRAVENOUS at 08:43

## 2024-09-07 RX ADMIN — GABAPENTIN 600 MG: 300 CAPSULE ORAL at 21:31

## 2024-09-07 RX ADMIN — SODIUM CHLORIDE, PRESERVATIVE FREE 10 ML: 5 INJECTION INTRAVENOUS at 21:32

## 2024-09-07 RX ADMIN — CARVEDILOL 6.25 MG: 6.25 TABLET, FILM COATED ORAL at 08:41

## 2024-09-07 RX ADMIN — OXYCODONE HYDROCHLORIDE AND ACETAMINOPHEN 1 TABLET: 5; 325 TABLET ORAL at 21:30

## 2024-09-07 RX ADMIN — ARFORMOTEROL TARTRATE 15 MCG: 15 SOLUTION RESPIRATORY (INHALATION) at 09:13

## 2024-09-07 RX ADMIN — CHLORTHALIDONE 25 MG: 25 TABLET ORAL at 08:40

## 2024-09-07 RX ADMIN — ARFORMOTEROL TARTRATE 15 MCG: 15 SOLUTION RESPIRATORY (INHALATION) at 20:34

## 2024-09-07 RX ADMIN — BUDESONIDE INHALATION 500 MCG: 0.5 SUSPENSION RESPIRATORY (INHALATION) at 20:34

## 2024-09-07 RX ADMIN — GABAPENTIN 600 MG: 300 CAPSULE ORAL at 14:28

## 2024-09-07 RX ADMIN — AMLODIPINE BESYLATE 10 MG: 10 TABLET ORAL at 08:41

## 2024-09-07 RX ADMIN — INSULIN LISPRO 2 UNITS: 100 INJECTION, SOLUTION INTRAVENOUS; SUBCUTANEOUS at 08:39

## 2024-09-07 RX ADMIN — HEPARIN SODIUM 5000 UNITS: 5000 INJECTION INTRAVENOUS; SUBCUTANEOUS at 14:28

## 2024-09-07 RX ADMIN — POLYETHYLENE GLYCOL 3350 17 G: 17 POWDER, FOR SOLUTION ORAL at 08:43

## 2024-09-07 RX ADMIN — FERROUS SULFATE TAB 325 MG (65 MG ELEMENTAL FE) 325 MG: 325 (65 FE) TAB at 16:09

## 2024-09-07 RX ADMIN — IPRATROPIUM BROMIDE AND ALBUTEROL SULFATE 1 DOSE: 2.5; .5 SOLUTION RESPIRATORY (INHALATION) at 09:13

## 2024-09-07 RX ADMIN — HYDRALAZINE HYDROCHLORIDE 100 MG: 50 TABLET ORAL at 14:28

## 2024-09-07 RX ADMIN — DOCUSATE SODIUM 100 MG: 100 CAPSULE, LIQUID FILLED ORAL at 08:40

## 2024-09-07 RX ADMIN — BUDESONIDE INHALATION 500 MCG: 0.5 SUSPENSION RESPIRATORY (INHALATION) at 09:13

## 2024-09-07 RX ADMIN — MICONAZOLE NITRATE: 20.6 POWDER TOPICAL at 08:38

## 2024-09-07 RX ADMIN — HYDRALAZINE HYDROCHLORIDE 100 MG: 50 TABLET ORAL at 21:31

## 2024-09-07 RX ADMIN — HEPARIN SODIUM 5000 UNITS: 5000 INJECTION INTRAVENOUS; SUBCUTANEOUS at 06:07

## 2024-09-07 RX ADMIN — IPRATROPIUM BROMIDE AND ALBUTEROL SULFATE 1 DOSE: 2.5; .5 SOLUTION RESPIRATORY (INHALATION) at 16:20

## 2024-09-07 RX ADMIN — MICONAZOLE NITRATE: 20.6 POWDER TOPICAL at 21:32

## 2024-09-07 RX ADMIN — HEPARIN SODIUM 5000 UNITS: 5000 INJECTION INTRAVENOUS; SUBCUTANEOUS at 21:31

## 2024-09-07 RX ADMIN — CLONIDINE HYDROCHLORIDE 0.1 MG: 0.1 TABLET ORAL at 08:41

## 2024-09-07 RX ADMIN — CARVEDILOL 6.25 MG: 6.25 TABLET, FILM COATED ORAL at 16:08

## 2024-09-07 RX ADMIN — FERROUS SULFATE TAB 325 MG (65 MG ELEMENTAL FE) 325 MG: 325 (65 FE) TAB at 08:39

## 2024-09-07 RX ADMIN — DOXAZOSIN 2 MG: 2 TABLET ORAL at 21:31

## 2024-09-07 RX ADMIN — IPRATROPIUM BROMIDE AND ALBUTEROL SULFATE 1 DOSE: 2.5; .5 SOLUTION RESPIRATORY (INHALATION) at 20:34

## 2024-09-07 RX ADMIN — DULOXETINE HYDROCHLORIDE 120 MG: 60 CAPSULE, DELAYED RELEASE ORAL at 08:39

## 2024-09-07 RX ADMIN — PETROLATUM: 420 OINTMENT TOPICAL at 08:38

## 2024-09-07 RX ADMIN — INSULIN GLARGINE 15 UNITS: 100 INJECTION, SOLUTION SUBCUTANEOUS at 08:42

## 2024-09-07 RX ADMIN — HYDRALAZINE HYDROCHLORIDE 100 MG: 50 TABLET ORAL at 08:41

## 2024-09-07 RX ADMIN — GABAPENTIN 600 MG: 300 CAPSULE ORAL at 08:40

## 2024-09-07 ASSESSMENT — PAIN DESCRIPTION - LOCATION: LOCATION: BACK

## 2024-09-07 ASSESSMENT — PAIN DESCRIPTION - ORIENTATION: ORIENTATION: LOWER

## 2024-09-07 ASSESSMENT — PAIN SCALES - GENERAL
PAINLEVEL_OUTOF10: 0
PAINLEVEL_OUTOF10: 10
PAINLEVEL_OUTOF10: 5

## 2024-09-07 ASSESSMENT — PAIN - FUNCTIONAL ASSESSMENT: PAIN_FUNCTIONAL_ASSESSMENT: PREVENTS OR INTERFERES SOME ACTIVE ACTIVITIES AND ADLS

## 2024-09-07 ASSESSMENT — PAIN DESCRIPTION - DESCRIPTORS: DESCRIPTORS: ACHING;SHARP;DISCOMFORT

## 2024-09-08 LAB
ANION GAP SERPL CALCULATED.3IONS-SCNC: 10 MMOL/L (ref 7–16)
BUN SERPL-MCNC: 32 MG/DL (ref 6–23)
CALCIUM SERPL-MCNC: 9.1 MG/DL (ref 8.6–10.2)
CHLORIDE SERPL-SCNC: 102 MMOL/L (ref 98–107)
CO2 SERPL-SCNC: 28 MMOL/L (ref 22–29)
CREAT SERPL-MCNC: 1.7 MG/DL (ref 0.7–1.2)
GFR, ESTIMATED: 44 ML/MIN/1.73M2
GLUCOSE BLD-MCNC: 124 MG/DL (ref 74–99)
GLUCOSE BLD-MCNC: 161 MG/DL (ref 74–99)
GLUCOSE BLD-MCNC: 181 MG/DL (ref 74–99)
GLUCOSE BLD-MCNC: 205 MG/DL (ref 74–99)
GLUCOSE SERPL-MCNC: 114 MG/DL (ref 74–99)
POTASSIUM SERPL-SCNC: 4.6 MMOL/L (ref 3.5–5)
SODIUM SERPL-SCNC: 140 MMOL/L (ref 132–146)

## 2024-09-08 PROCEDURE — 6370000000 HC RX 637 (ALT 250 FOR IP): Performed by: INTERNAL MEDICINE

## 2024-09-08 PROCEDURE — 82962 GLUCOSE BLOOD TEST: CPT

## 2024-09-08 PROCEDURE — 6370000000 HC RX 637 (ALT 250 FOR IP)

## 2024-09-08 PROCEDURE — 99231 SBSQ HOSP IP/OBS SF/LOW 25: CPT | Performed by: INTERNAL MEDICINE

## 2024-09-08 PROCEDURE — 94640 AIRWAY INHALATION TREATMENT: CPT

## 2024-09-08 PROCEDURE — 2700000000 HC OXYGEN THERAPY PER DAY

## 2024-09-08 PROCEDURE — 94660 CPAP INITIATION&MGMT: CPT

## 2024-09-08 PROCEDURE — 36415 COLL VENOUS BLD VENIPUNCTURE: CPT

## 2024-09-08 PROCEDURE — 2140000000 HC CCU INTERMEDIATE R&B

## 2024-09-08 PROCEDURE — 80048 BASIC METABOLIC PNL TOTAL CA: CPT

## 2024-09-08 PROCEDURE — 6360000002 HC RX W HCPCS

## 2024-09-08 PROCEDURE — 2580000003 HC RX 258

## 2024-09-08 RX ORDER — ALBUTEROL SULFATE 0.83 MG/ML
2.5 SOLUTION RESPIRATORY (INHALATION) EVERY 6 HOURS PRN
Qty: 120 EACH | Refills: 3 | Status: SHIPPED | OUTPATIENT
Start: 2024-09-08

## 2024-09-08 RX ADMIN — HEPARIN SODIUM 5000 UNITS: 5000 INJECTION INTRAVENOUS; SUBCUTANEOUS at 20:33

## 2024-09-08 RX ADMIN — IPRATROPIUM BROMIDE AND ALBUTEROL SULFATE 1 DOSE: 2.5; .5 SOLUTION RESPIRATORY (INHALATION) at 09:01

## 2024-09-08 RX ADMIN — INSULIN GLARGINE 15 UNITS: 100 INJECTION, SOLUTION SUBCUTANEOUS at 08:38

## 2024-09-08 RX ADMIN — HYDRALAZINE HYDROCHLORIDE 100 MG: 50 TABLET ORAL at 14:25

## 2024-09-08 RX ADMIN — DOCUSATE SODIUM 100 MG: 100 CAPSULE, LIQUID FILLED ORAL at 08:39

## 2024-09-08 RX ADMIN — IPRATROPIUM BROMIDE AND ALBUTEROL SULFATE 1 DOSE: 2.5; .5 SOLUTION RESPIRATORY (INHALATION) at 12:58

## 2024-09-08 RX ADMIN — MICONAZOLE NITRATE: 20.6 POWDER TOPICAL at 20:36

## 2024-09-08 RX ADMIN — ARFORMOTEROL TARTRATE 15 MCG: 15 SOLUTION RESPIRATORY (INHALATION) at 09:01

## 2024-09-08 RX ADMIN — GABAPENTIN 600 MG: 300 CAPSULE ORAL at 20:33

## 2024-09-08 RX ADMIN — CARVEDILOL 6.25 MG: 6.25 TABLET, FILM COATED ORAL at 17:18

## 2024-09-08 RX ADMIN — AMLODIPINE BESYLATE 10 MG: 10 TABLET ORAL at 08:40

## 2024-09-08 RX ADMIN — BUDESONIDE INHALATION 500 MCG: 0.5 SUSPENSION RESPIRATORY (INHALATION) at 20:02

## 2024-09-08 RX ADMIN — SODIUM CHLORIDE, PRESERVATIVE FREE 10 ML: 5 INJECTION INTRAVENOUS at 08:40

## 2024-09-08 RX ADMIN — IPRATROPIUM BROMIDE AND ALBUTEROL SULFATE 1 DOSE: 2.5; .5 SOLUTION RESPIRATORY (INHALATION) at 20:02

## 2024-09-08 RX ADMIN — HEPARIN SODIUM 5000 UNITS: 5000 INJECTION INTRAVENOUS; SUBCUTANEOUS at 14:25

## 2024-09-08 RX ADMIN — ARFORMOTEROL TARTRATE 15 MCG: 15 SOLUTION RESPIRATORY (INHALATION) at 20:02

## 2024-09-08 RX ADMIN — HYDRALAZINE HYDROCHLORIDE 100 MG: 50 TABLET ORAL at 08:39

## 2024-09-08 RX ADMIN — FERROUS SULFATE TAB 325 MG (65 MG ELEMENTAL FE) 325 MG: 325 (65 FE) TAB at 17:18

## 2024-09-08 RX ADMIN — HYDRALAZINE HYDROCHLORIDE 100 MG: 50 TABLET ORAL at 20:32

## 2024-09-08 RX ADMIN — CARVEDILOL 6.25 MG: 6.25 TABLET, FILM COATED ORAL at 08:39

## 2024-09-08 RX ADMIN — OXYCODONE HYDROCHLORIDE AND ACETAMINOPHEN 1 TABLET: 5; 325 TABLET ORAL at 20:32

## 2024-09-08 RX ADMIN — MIRTAZAPINE 7.5 MG: 15 TABLET, FILM COATED ORAL at 20:35

## 2024-09-08 RX ADMIN — BUDESONIDE INHALATION 500 MCG: 0.5 SUSPENSION RESPIRATORY (INHALATION) at 09:01

## 2024-09-08 RX ADMIN — CHLORTHALIDONE 25 MG: 25 TABLET ORAL at 08:40

## 2024-09-08 RX ADMIN — MICONAZOLE NITRATE: 20.6 POWDER TOPICAL at 08:43

## 2024-09-08 RX ADMIN — GABAPENTIN 600 MG: 300 CAPSULE ORAL at 14:25

## 2024-09-08 RX ADMIN — DULOXETINE HYDROCHLORIDE 120 MG: 60 CAPSULE, DELAYED RELEASE ORAL at 08:39

## 2024-09-08 RX ADMIN — GABAPENTIN 600 MG: 300 CAPSULE ORAL at 08:40

## 2024-09-08 RX ADMIN — HEPARIN SODIUM 5000 UNITS: 5000 INJECTION INTRAVENOUS; SUBCUTANEOUS at 06:06

## 2024-09-08 RX ADMIN — SODIUM CHLORIDE, PRESERVATIVE FREE 10 ML: 5 INJECTION INTRAVENOUS at 20:36

## 2024-09-08 RX ADMIN — FERROUS SULFATE TAB 325 MG (65 MG ELEMENTAL FE) 325 MG: 325 (65 FE) TAB at 08:40

## 2024-09-08 RX ADMIN — PETROLATUM: 420 OINTMENT TOPICAL at 17:17

## 2024-09-08 RX ADMIN — DOXAZOSIN 2 MG: 2 TABLET ORAL at 20:35

## 2024-09-08 RX ADMIN — OXYCODONE HYDROCHLORIDE AND ACETAMINOPHEN 1 TABLET: 5; 325 TABLET ORAL at 08:40

## 2024-09-08 RX ADMIN — IPRATROPIUM BROMIDE AND ALBUTEROL SULFATE 1 DOSE: 2.5; .5 SOLUTION RESPIRATORY (INHALATION) at 16:12

## 2024-09-08 RX ADMIN — PETROLATUM: 420 OINTMENT TOPICAL at 08:43

## 2024-09-08 RX ADMIN — CLONIDINE HYDROCHLORIDE 0.1 MG: 0.1 TABLET ORAL at 08:39

## 2024-09-08 RX ADMIN — CLONIDINE HYDROCHLORIDE 0.1 MG: 0.1 TABLET ORAL at 20:33

## 2024-09-08 ASSESSMENT — PAIN SCALES - GENERAL
PAINLEVEL_OUTOF10: 0
PAINLEVEL_OUTOF10: 6
PAINLEVEL_OUTOF10: 9
PAINLEVEL_OUTOF10: 0
PAINLEVEL_OUTOF10: 4
PAINLEVEL_OUTOF10: 8
PAINLEVEL_OUTOF10: 4

## 2024-09-08 ASSESSMENT — PAIN DESCRIPTION - DESCRIPTORS
DESCRIPTORS: ACHING;SHARP;DISCOMFORT
DESCRIPTORS: ACHING;DISCOMFORT;SORE

## 2024-09-08 ASSESSMENT — PAIN DESCRIPTION - LOCATION
LOCATION: BACK;HIP
LOCATION: BACK

## 2024-09-08 ASSESSMENT — PAIN SCALES - WONG BAKER
WONGBAKER_NUMERICALRESPONSE: HURTS A LITTLE BIT
WONGBAKER_NUMERICALRESPONSE: HURTS A LITTLE BIT

## 2024-09-08 ASSESSMENT — PAIN - FUNCTIONAL ASSESSMENT: PAIN_FUNCTIONAL_ASSESSMENT: PREVENTS OR INTERFERES SOME ACTIVE ACTIVITIES AND ADLS

## 2024-09-08 ASSESSMENT — PAIN DESCRIPTION - ORIENTATION
ORIENTATION: LOWER
ORIENTATION: LEFT

## 2024-09-09 VITALS
SYSTOLIC BLOOD PRESSURE: 123 MMHG | HEIGHT: 74 IN | OXYGEN SATURATION: 100 % | BODY MASS INDEX: 35.27 KG/M2 | HEART RATE: 88 BPM | TEMPERATURE: 97.8 F | WEIGHT: 274.8 LBS | RESPIRATION RATE: 20 BRPM | DIASTOLIC BLOOD PRESSURE: 71 MMHG

## 2024-09-09 LAB
ANION GAP SERPL CALCULATED.3IONS-SCNC: 10 MMOL/L (ref 7–16)
BUN SERPL-MCNC: 28 MG/DL (ref 6–23)
CALCIUM SERPL-MCNC: 9 MG/DL (ref 8.6–10.2)
CHLORIDE SERPL-SCNC: 102 MMOL/L (ref 98–107)
CO2 SERPL-SCNC: 28 MMOL/L (ref 22–29)
CREAT SERPL-MCNC: 1.6 MG/DL (ref 0.7–1.2)
GFR, ESTIMATED: 47 ML/MIN/1.73M2
GLUCOSE BLD-MCNC: 108 MG/DL (ref 74–99)
GLUCOSE BLD-MCNC: 140 MG/DL (ref 74–99)
GLUCOSE BLD-MCNC: 205 MG/DL (ref 74–99)
GLUCOSE SERPL-MCNC: 104 MG/DL (ref 74–99)
POTASSIUM SERPL-SCNC: 4.5 MMOL/L (ref 3.5–5)
SODIUM SERPL-SCNC: 140 MMOL/L (ref 132–146)

## 2024-09-09 PROCEDURE — 80048 BASIC METABOLIC PNL TOTAL CA: CPT

## 2024-09-09 PROCEDURE — 6370000000 HC RX 637 (ALT 250 FOR IP): Performed by: STUDENT IN AN ORGANIZED HEALTH CARE EDUCATION/TRAINING PROGRAM

## 2024-09-09 PROCEDURE — 2700000000 HC OXYGEN THERAPY PER DAY

## 2024-09-09 PROCEDURE — 36415 COLL VENOUS BLD VENIPUNCTURE: CPT

## 2024-09-09 PROCEDURE — 99239 HOSP IP/OBS DSCHRG MGMT >30: CPT | Performed by: INTERNAL MEDICINE

## 2024-09-09 PROCEDURE — 6360000002 HC RX W HCPCS

## 2024-09-09 PROCEDURE — 6370000000 HC RX 637 (ALT 250 FOR IP): Performed by: INTERNAL MEDICINE

## 2024-09-09 PROCEDURE — 6370000000 HC RX 637 (ALT 250 FOR IP)

## 2024-09-09 PROCEDURE — 94640 AIRWAY INHALATION TREATMENT: CPT

## 2024-09-09 PROCEDURE — 2580000003 HC RX 258

## 2024-09-09 PROCEDURE — 94660 CPAP INITIATION&MGMT: CPT

## 2024-09-09 PROCEDURE — 82962 GLUCOSE BLOOD TEST: CPT

## 2024-09-09 RX ORDER — CARVEDILOL 6.25 MG/1
6.25 TABLET ORAL 2 TIMES DAILY WITH MEALS
Qty: 60 TABLET | Refills: 3 | Status: SHIPPED | OUTPATIENT
Start: 2024-09-09 | End: 2024-09-10 | Stop reason: SDUPTHER

## 2024-09-09 RX ORDER — ATORVASTATIN CALCIUM 40 MG/1
40 TABLET, FILM COATED ORAL NIGHTLY
Qty: 30 TABLET | Refills: 3 | Status: SHIPPED | OUTPATIENT
Start: 2024-09-09

## 2024-09-09 RX ORDER — CHLORTHALIDONE 25 MG/1
25 TABLET ORAL DAILY
Qty: 30 TABLET | Refills: 3 | Status: SHIPPED | OUTPATIENT
Start: 2024-09-09 | End: 2024-09-10 | Stop reason: SDUPTHER

## 2024-09-09 RX ORDER — DOXAZOSIN 2 MG/1
2 TABLET ORAL
Qty: 30 TABLET | Refills: 3 | Status: SHIPPED | OUTPATIENT
Start: 2024-09-09 | End: 2024-09-10 | Stop reason: SDUPTHER

## 2024-09-09 RX ORDER — INSULIN GLARGINE 100 [IU]/ML
15 INJECTION, SOLUTION SUBCUTANEOUS NIGHTLY
Qty: 10 ML | Refills: 3 | Status: SHIPPED | OUTPATIENT
Start: 2024-09-09

## 2024-09-09 RX ORDER — ASPIRIN 81 MG/1
81 TABLET, CHEWABLE ORAL DAILY
Status: DISCONTINUED | OUTPATIENT
Start: 2024-09-09 | End: 2024-09-09 | Stop reason: HOSPADM

## 2024-09-09 RX ORDER — CLONIDINE HYDROCHLORIDE 0.1 MG/1
0.1 TABLET ORAL 2 TIMES DAILY
Qty: 60 TABLET | Refills: 3 | Status: SHIPPED | OUTPATIENT
Start: 2024-09-09

## 2024-09-09 RX ORDER — ASPIRIN 81 MG/1
81 TABLET, CHEWABLE ORAL DAILY
Qty: 30 TABLET | Refills: 3 | Status: SHIPPED | OUTPATIENT
Start: 2024-09-10

## 2024-09-09 RX ORDER — ATORVASTATIN CALCIUM 40 MG/1
40 TABLET, FILM COATED ORAL NIGHTLY
Status: DISCONTINUED | OUTPATIENT
Start: 2024-09-09 | End: 2024-09-09 | Stop reason: HOSPADM

## 2024-09-09 RX ORDER — BLOOD SUGAR DIAGNOSTIC
STRIP MISCELLANEOUS
Qty: 200 EACH | Refills: 10 | Status: SHIPPED | OUTPATIENT
Start: 2024-09-09

## 2024-09-09 RX ORDER — AMMONIUM LACTATE 12 G/100G
LOTION TOPICAL
Qty: 400 G | Refills: 10 | Status: SHIPPED | OUTPATIENT
Start: 2024-09-09

## 2024-09-09 RX ORDER — FERROUS SULFATE 325(65) MG
325 TABLET ORAL 2 TIMES DAILY WITH MEALS
Qty: 30 TABLET | Refills: 3 | Status: SHIPPED | OUTPATIENT
Start: 2024-09-09

## 2024-09-09 RX ADMIN — CARVEDILOL 6.25 MG: 6.25 TABLET, FILM COATED ORAL at 08:30

## 2024-09-09 RX ADMIN — ASPIRIN 81 MG 81 MG: 81 TABLET ORAL at 08:30

## 2024-09-09 RX ADMIN — CLONIDINE HYDROCHLORIDE 0.1 MG: 0.1 TABLET ORAL at 08:30

## 2024-09-09 RX ADMIN — IPRATROPIUM BROMIDE AND ALBUTEROL SULFATE 1 DOSE: 2.5; .5 SOLUTION RESPIRATORY (INHALATION) at 12:06

## 2024-09-09 RX ADMIN — GABAPENTIN 600 MG: 300 CAPSULE ORAL at 14:42

## 2024-09-09 RX ADMIN — INSULIN GLARGINE 15 UNITS: 100 INJECTION, SOLUTION SUBCUTANEOUS at 08:30

## 2024-09-09 RX ADMIN — FERROUS SULFATE TAB 325 MG (65 MG ELEMENTAL FE) 325 MG: 325 (65 FE) TAB at 08:30

## 2024-09-09 RX ADMIN — SODIUM CHLORIDE, PRESERVATIVE FREE 10 ML: 5 INJECTION INTRAVENOUS at 08:30

## 2024-09-09 RX ADMIN — AMLODIPINE BESYLATE 10 MG: 10 TABLET ORAL at 08:30

## 2024-09-09 RX ADMIN — PETROLATUM: 420 OINTMENT TOPICAL at 17:00

## 2024-09-09 RX ADMIN — HYDRALAZINE HYDROCHLORIDE 100 MG: 50 TABLET ORAL at 08:30

## 2024-09-09 RX ADMIN — OXYCODONE HYDROCHLORIDE AND ACETAMINOPHEN 1 TABLET: 5; 325 TABLET ORAL at 08:35

## 2024-09-09 RX ADMIN — HYDRALAZINE HYDROCHLORIDE 100 MG: 50 TABLET ORAL at 14:42

## 2024-09-09 RX ADMIN — ARFORMOTEROL TARTRATE 15 MCG: 15 SOLUTION RESPIRATORY (INHALATION) at 08:43

## 2024-09-09 RX ADMIN — CHLORTHALIDONE 25 MG: 25 TABLET ORAL at 08:30

## 2024-09-09 RX ADMIN — HEPARIN SODIUM 5000 UNITS: 5000 INJECTION INTRAVENOUS; SUBCUTANEOUS at 14:42

## 2024-09-09 RX ADMIN — DULOXETINE HYDROCHLORIDE 120 MG: 60 CAPSULE, DELAYED RELEASE ORAL at 08:29

## 2024-09-09 RX ADMIN — IPRATROPIUM BROMIDE AND ALBUTEROL SULFATE 1 DOSE: 2.5; .5 SOLUTION RESPIRATORY (INHALATION) at 08:43

## 2024-09-09 RX ADMIN — PETROLATUM: 420 OINTMENT TOPICAL at 08:36

## 2024-09-09 RX ADMIN — GABAPENTIN 600 MG: 300 CAPSULE ORAL at 08:30

## 2024-09-09 RX ADMIN — IPRATROPIUM BROMIDE AND ALBUTEROL SULFATE 1 DOSE: 2.5; .5 SOLUTION RESPIRATORY (INHALATION) at 16:19

## 2024-09-09 RX ADMIN — BUDESONIDE INHALATION 500 MCG: 0.5 SUSPENSION RESPIRATORY (INHALATION) at 08:43

## 2024-09-09 RX ADMIN — MICONAZOLE NITRATE: 20.6 POWDER TOPICAL at 08:36

## 2024-09-09 RX ADMIN — CARVEDILOL 6.25 MG: 6.25 TABLET, FILM COATED ORAL at 17:00

## 2024-09-09 RX ADMIN — HEPARIN SODIUM 5000 UNITS: 5000 INJECTION INTRAVENOUS; SUBCUTANEOUS at 06:30

## 2024-09-09 RX ADMIN — INSULIN LISPRO 2 UNITS: 100 INJECTION, SOLUTION INTRAVENOUS; SUBCUTANEOUS at 12:21

## 2024-09-09 RX ADMIN — FERROUS SULFATE TAB 325 MG (65 MG ELEMENTAL FE) 325 MG: 325 (65 FE) TAB at 17:00

## 2024-09-09 ASSESSMENT — PAIN SCALES - GENERAL
PAINLEVEL_OUTOF10: 0
PAINLEVEL_OUTOF10: 9

## 2024-09-09 ASSESSMENT — PAIN DESCRIPTION - LOCATION: LOCATION: HIP

## 2024-09-09 ASSESSMENT — PAIN DESCRIPTION - ORIENTATION: ORIENTATION: RIGHT

## 2024-09-09 ASSESSMENT — PAIN DESCRIPTION - DESCRIPTORS: DESCRIPTORS: ACHING;DISCOMFORT;SORE

## 2024-09-10 ENCOUNTER — TELEPHONE (OUTPATIENT)
Dept: VASCULAR SURGERY | Age: 69
End: 2024-09-10

## 2024-09-10 ENCOUNTER — TELEPHONE (OUTPATIENT)
Dept: PRIMARY CARE CLINIC | Age: 69
End: 2024-09-10

## 2024-09-10 DIAGNOSIS — G89.18 POSTOPERATIVE PAIN: ICD-10-CM

## 2024-09-10 DIAGNOSIS — G89.4 CHRONIC PAIN SYNDROME: ICD-10-CM

## 2024-09-10 RX ORDER — OXYCODONE AND ACETAMINOPHEN 7.5; 325 MG/1; MG/1
1 TABLET ORAL EVERY 6 HOURS PRN
Qty: 120 TABLET | Refills: 0 | Status: SHIPPED
Start: 2024-09-10 | End: 2024-09-13

## 2024-09-10 RX ORDER — OXYCODONE HCL 10 MG/1
10 TABLET, FILM COATED, EXTENDED RELEASE ORAL 2 TIMES DAILY
Qty: 60 TABLET | Refills: 0 | Status: SHIPPED | OUTPATIENT
Start: 2024-09-10 | End: 2024-10-10

## 2024-09-11 ENCOUNTER — ENROLLMENT (OUTPATIENT)
Dept: PHARMACY | Facility: CLINIC | Age: 69
End: 2024-09-11

## 2024-09-11 PROBLEM — I70.1 RENAL ARTERY STENOSIS (HCC): Status: ACTIVE | Noted: 2024-09-11

## 2024-09-11 LAB
MICROORGANISM SPEC CULT: NORMAL
MICROORGANISM SPEC CULT: NORMAL
MICROORGANISM/AGENT SPEC: NORMAL
MICROORGANISM/AGENT SPEC: NORMAL
SPECIMEN DESCRIPTION: NORMAL
SPECIMEN DESCRIPTION: NORMAL

## 2024-09-13 DIAGNOSIS — E11.65 UNCONTROLLED TYPE 2 DIABETES MELLITUS WITH HYPERGLYCEMIA (HCC): ICD-10-CM

## 2024-09-13 DIAGNOSIS — G89.4 CHRONIC PAIN SYNDROME: ICD-10-CM

## 2024-09-13 DIAGNOSIS — G89.18 POSTOPERATIVE PAIN: ICD-10-CM

## 2024-09-13 RX ORDER — OXYCODONE AND ACETAMINOPHEN 7.5; 325 MG/1; MG/1
1 TABLET ORAL EVERY 4 HOURS PRN
Qty: 120 TABLET | Refills: 0 | Status: SHIPPED | OUTPATIENT
Start: 2024-09-13 | End: 2024-10-13

## 2024-09-13 RX ORDER — INSULIN GLARGINE 100 [IU]/ML
INJECTION, SOLUTION SUBCUTANEOUS
Qty: 15 ML | Refills: 10 | Status: SHIPPED | OUTPATIENT
Start: 2024-09-13

## 2024-09-17 RX ORDER — MUPIROCIN 20 MG/G
OINTMENT TOPICAL
Qty: 22 G | Refills: 10 | Status: SHIPPED | OUTPATIENT
Start: 2024-09-17

## 2024-09-24 ENCOUNTER — ANESTHESIA EVENT (OUTPATIENT)
Age: 69
End: 2024-09-24
Payer: MEDICARE

## 2024-09-24 ENCOUNTER — ANESTHESIA (OUTPATIENT)
Age: 69
End: 2024-09-24
Payer: MEDICARE

## 2024-09-24 ENCOUNTER — HOSPITAL ENCOUNTER (OUTPATIENT)
Age: 69
Discharge: HOME | End: 2024-09-24
Attending: SURGERY | Admitting: SURGERY
Payer: MEDICARE

## 2024-09-24 VITALS
TEMPERATURE: 98.5 F | SYSTOLIC BLOOD PRESSURE: 172 MMHG | BODY MASS INDEX: 35.94 KG/M2 | DIASTOLIC BLOOD PRESSURE: 80 MMHG | HEIGHT: 74 IN | HEART RATE: 84 BPM | RESPIRATION RATE: 21 BRPM | OXYGEN SATURATION: 95 % | WEIGHT: 280 LBS

## 2024-09-24 DIAGNOSIS — I73.1 THROMBOANGIITIS OBLITERANS (BUERGER'S DISEASE) (HCC): ICD-10-CM

## 2024-09-24 PROBLEM — I73.9 PVD (PERIPHERAL VASCULAR DISEASE) (HCC): Status: ACTIVE | Noted: 2024-09-24

## 2024-09-24 LAB
ABO + RH BLD: NORMAL
ANION GAP SERPL CALCULATED.3IONS-SCNC: 8 MMOL/L (ref 7–16)
ARM BAND NUMBER: NORMAL
BLOOD BANK SAMPLE EXPIRATION: NORMAL
BLOOD GROUP ANTIBODIES SERPL: NEGATIVE
BUN SERPL-MCNC: 22 MG/DL (ref 6–23)
CALCIUM SERPL-MCNC: 8.8 MG/DL (ref 8.6–10.2)
CHLORIDE SERPL-SCNC: 103 MMOL/L (ref 98–107)
CO2 SERPL-SCNC: 29 MMOL/L (ref 22–29)
CREAT SERPL-MCNC: 1.6 MG/DL (ref 0.7–1.2)
ECHO BSA: 2.57 M2
ERYTHROCYTE [DISTWIDTH] IN BLOOD BY AUTOMATED COUNT: 20.1 % (ref 11.5–15)
GFR, ESTIMATED: 48 ML/MIN/1.73M2
GLUCOSE SERPL-MCNC: 84 MG/DL (ref 74–99)
HCT VFR BLD AUTO: 32.2 % (ref 37–54)
HGB BLD-MCNC: 9.5 G/DL (ref 12.5–16.5)
MCH RBC QN AUTO: 24.5 PG (ref 26–35)
MCHC RBC AUTO-ENTMCNC: 29.5 G/DL (ref 32–34.5)
MCV RBC AUTO: 83 FL (ref 80–99.9)
PLATELET # BLD AUTO: 246 K/UL (ref 130–450)
PMV BLD AUTO: 9.7 FL (ref 7–12)
POTASSIUM SERPL-SCNC: 4.7 MMOL/L (ref 3.5–5)
RBC # BLD AUTO: 3.88 M/UL (ref 3.8–5.8)
SODIUM SERPL-SCNC: 140 MMOL/L (ref 132–146)
WBC OTHER # BLD: 10 K/UL (ref 4.5–11.5)

## 2024-09-24 PROCEDURE — 6360000004 HC RX CONTRAST MEDICATION: Performed by: SURGERY

## 2024-09-24 PROCEDURE — 6360000002 HC RX W HCPCS: Performed by: NURSE PRACTITIONER

## 2024-09-24 PROCEDURE — 85027 COMPLETE CBC AUTOMATED: CPT

## 2024-09-24 PROCEDURE — 7100000011 HC PHASE II RECOVERY - ADDTL 15 MIN: Performed by: SURGERY

## 2024-09-24 PROCEDURE — 80048 BASIC METABOLIC PNL TOTAL CA: CPT

## 2024-09-24 PROCEDURE — 2580000003 HC RX 258: Performed by: SURGERY

## 2024-09-24 PROCEDURE — 86901 BLOOD TYPING SEROLOGIC RH(D): CPT

## 2024-09-24 PROCEDURE — 7100000010 HC PHASE II RECOVERY - FIRST 15 MIN: Performed by: SURGERY

## 2024-09-24 PROCEDURE — C1894 INTRO/SHEATH, NON-LASER: HCPCS | Performed by: SURGERY

## 2024-09-24 PROCEDURE — 86900 BLOOD TYPING SEROLOGIC ABO: CPT

## 2024-09-24 PROCEDURE — 2709999900 HC NON-CHARGEABLE SUPPLY: Performed by: SURGERY

## 2024-09-24 PROCEDURE — 86850 RBC ANTIBODY SCREEN: CPT

## 2024-09-24 PROCEDURE — 2580000003 HC RX 258: Performed by: NURSE PRACTITIONER

## 2024-09-24 PROCEDURE — 2500000003 HC RX 250 WO HCPCS: Performed by: SURGERY

## 2024-09-24 PROCEDURE — 36252 INS CATH REN ART 1ST BILAT: CPT | Performed by: SURGERY

## 2024-09-24 PROCEDURE — C1769 GUIDE WIRE: HCPCS | Performed by: SURGERY

## 2024-09-24 PROCEDURE — 6360000002 HC RX W HCPCS: Performed by: SURGERY

## 2024-09-24 RX ORDER — HYDRALAZINE HYDROCHLORIDE 20 MG/ML
10 INJECTION INTRAMUSCULAR; INTRAVENOUS ONCE
Status: COMPLETED | OUTPATIENT
Start: 2024-09-24 | End: 2024-09-24

## 2024-09-24 RX ORDER — SODIUM CHLORIDE 0.9 % (FLUSH) 0.9 %
5-40 SYRINGE (ML) INJECTION PRN
Status: DISCONTINUED | OUTPATIENT
Start: 2024-09-24 | End: 2024-09-24 | Stop reason: HOSPADM

## 2024-09-24 RX ORDER — FENTANYL CITRATE 50 UG/ML
INJECTION, SOLUTION INTRAMUSCULAR; INTRAVENOUS PRN
Status: DISCONTINUED | OUTPATIENT
Start: 2024-09-24 | End: 2024-09-24 | Stop reason: HOSPADM

## 2024-09-24 RX ORDER — IOPAMIDOL 612 MG/ML
INJECTION, SOLUTION INTRAVASCULAR PRN
Status: DISCONTINUED | OUTPATIENT
Start: 2024-09-24 | End: 2024-09-24 | Stop reason: HOSPADM

## 2024-09-24 RX ORDER — SODIUM CHLORIDE 9 MG/ML
INJECTION, SOLUTION INTRAVENOUS CONTINUOUS PRN
Status: COMPLETED | OUTPATIENT
Start: 2024-09-24 | End: 2024-09-24

## 2024-09-24 RX ORDER — SODIUM CHLORIDE 9 MG/ML
INJECTION, SOLUTION INTRAVENOUS PRN
Status: DISCONTINUED | OUTPATIENT
Start: 2024-09-24 | End: 2024-09-24

## 2024-09-24 RX ORDER — MIDAZOLAM HYDROCHLORIDE 1 MG/ML
INJECTION INTRAMUSCULAR; INTRAVENOUS PRN
Status: DISCONTINUED | OUTPATIENT
Start: 2024-09-24 | End: 2024-09-24 | Stop reason: HOSPADM

## 2024-09-24 RX ORDER — OXYCODONE AND ACETAMINOPHEN 7.5; 325 MG/1; MG/1
1 TABLET ORAL EVERY 4 HOURS PRN
Status: DISCONTINUED | OUTPATIENT
Start: 2024-09-24 | End: 2024-09-24 | Stop reason: HOSPADM

## 2024-09-24 RX ORDER — SODIUM CHLORIDE 0.9 % (FLUSH) 0.9 %
5-40 SYRINGE (ML) INJECTION EVERY 12 HOURS SCHEDULED
Status: DISCONTINUED | OUTPATIENT
Start: 2024-09-24 | End: 2024-09-24

## 2024-09-24 RX ORDER — SODIUM CHLORIDE 9 MG/ML
INJECTION, SOLUTION INTRAVENOUS CONTINUOUS
Status: DISCONTINUED | OUTPATIENT
Start: 2024-09-24 | End: 2024-09-24

## 2024-09-24 RX ORDER — HYDRALAZINE HYDROCHLORIDE 20 MG/ML
INJECTION INTRAMUSCULAR; INTRAVENOUS PRN
Status: DISCONTINUED | OUTPATIENT
Start: 2024-09-24 | End: 2024-09-24 | Stop reason: HOSPADM

## 2024-09-24 RX ORDER — ONDANSETRON 2 MG/ML
4 INJECTION INTRAMUSCULAR; INTRAVENOUS EVERY 6 HOURS PRN
Status: DISCONTINUED | OUTPATIENT
Start: 2024-09-24 | End: 2024-09-24 | Stop reason: HOSPADM

## 2024-09-24 RX ORDER — ACETAMINOPHEN 325 MG/1
650 TABLET ORAL EVERY 4 HOURS PRN
Status: DISCONTINUED | OUTPATIENT
Start: 2024-09-24 | End: 2024-09-24 | Stop reason: HOSPADM

## 2024-09-24 RX ADMIN — HYDRALAZINE HYDROCHLORIDE 10 MG: 20 INJECTION INTRAMUSCULAR; INTRAVENOUS at 10:15

## 2024-09-26 NOTE — DISCHARGE INSTRUCTIONS
Visit Discharge/Physician Orders    Discharge condition: Stable    Assessment of pain at discharge:Assessed     Anesthetic used: 4% Lido Soln    Discharge to: Home    Left via:Private automobile    Accompanied by: accompanied by self    ECF/HHA: [facility]     Dressing Orders:LEFT ANKLE WOUND: Cleanse with normal saline, apply Calcium Alginate, ABD, and Profore. Change 3x/k (Monday, Wednesday(@[facility]), and Friday)    Treatment Orders:FOLLOW NUTRITIOUS DIET. CHOOSE FOODS HIGH IN PROTEIN -CHICKEN- FISH-AND EGGS,  CHOOSE FOODS HIGH IN VITAMIN C.   MULTIVITAMIN DAILY.      [facility] followup visit _____Dr. KAMINSKI in 2 weeks________________________  (Please note your next appointment above and if you are unable to keep, kindly give a 24 hour notice. Thank you.)    Physician signature:__________________________      If you experience any of the following, please call the Wound Care Center during business hours:    * Increase in Pain  * Temperature over 101  * Increase in drainage from your wound  * Drainage with a foul odor  * Bleeding  * Increase in swelling  * Need for compression bandage changes due to slippage, breakthrough drainage.    If you need medical attention outside of the business hours of the Wound Care Centers please contact your PCP or go to the nearest emergency room.

## 2024-10-02 ENCOUNTER — HOSPITAL ENCOUNTER (OUTPATIENT)
Dept: WOUND CARE | Age: 69
Discharge: HOME | End: 2024-10-02
Attending: SURGERY
Payer: MEDICARE

## 2024-10-02 VITALS
BODY MASS INDEX: 34.65 KG/M2 | WEIGHT: 270 LBS | HEIGHT: 74 IN | HEART RATE: 76 BPM | SYSTOLIC BLOOD PRESSURE: 124 MMHG | DIASTOLIC BLOOD PRESSURE: 64 MMHG | RESPIRATION RATE: 20 BRPM | TEMPERATURE: 98.6 F

## 2024-10-02 DIAGNOSIS — L97.224 DIABETIC ULCER OF LEFT CALF ASSOCIATED WITH TYPE 2 DIABETES MELLITUS, WITH NECROSIS OF BONE (HCC): Chronic | ICD-10-CM

## 2024-10-02 DIAGNOSIS — E11.622 DIABETIC ULCER OF LEFT CALF ASSOCIATED WITH TYPE 2 DIABETES MELLITUS, WITH NECROSIS OF BONE (HCC): Chronic | ICD-10-CM

## 2024-10-02 DIAGNOSIS — I70.243 ATHEROSCLEROSIS OF NATIVE ARTERY OF LEFT LEG WITH ULCERATION OF ANKLE (HCC): Primary | Chronic | ICD-10-CM

## 2024-10-02 DIAGNOSIS — M86.462: ICD-10-CM

## 2024-10-02 LAB
MICROORGANISM SPEC CULT: NORMAL
MICROORGANISM/AGENT SPEC: NORMAL
SPECIMEN DESCRIPTION: NORMAL

## 2024-10-02 PROCEDURE — 11042 DBRDMT SUBQ TIS 1ST 20SQCM/<: CPT

## 2024-10-02 PROCEDURE — 11042 DBRDMT SUBQ TIS 1ST 20SQCM/<: CPT | Performed by: SURGERY

## 2024-10-02 PROCEDURE — 99213 OFFICE O/P EST LOW 20 MIN: CPT

## 2024-10-02 RX ORDER — LIDOCAINE HYDROCHLORIDE 40 MG/ML
SOLUTION TOPICAL ONCE
OUTPATIENT
Start: 2024-10-02 | End: 2024-10-02

## 2024-10-02 RX ORDER — SODIUM CHLOR/HYPOCHLOROUS ACID 0.033 %
SOLUTION, IRRIGATION IRRIGATION ONCE
OUTPATIENT
Start: 2024-10-02 | End: 2024-10-02

## 2024-10-02 RX ORDER — TRIAMCINOLONE ACETONIDE 1 MG/G
OINTMENT TOPICAL ONCE
OUTPATIENT
Start: 2024-10-02 | End: 2024-10-02

## 2024-10-02 RX ORDER — CLOBETASOL PROPIONATE 0.5 MG/G
OINTMENT TOPICAL ONCE
OUTPATIENT
Start: 2024-10-02 | End: 2024-10-02

## 2024-10-02 RX ORDER — MUPIROCIN 20 MG/G
OINTMENT TOPICAL ONCE
OUTPATIENT
Start: 2024-10-02 | End: 2024-10-02

## 2024-10-02 RX ORDER — SILVER SULFADIAZINE 10 MG/G
CREAM TOPICAL ONCE
OUTPATIENT
Start: 2024-10-02 | End: 2024-10-02

## 2024-10-02 RX ORDER — BACITRACIN ZINC AND POLYMYXIN B SULFATE 500; 1000 [USP'U]/G; [USP'U]/G
OINTMENT TOPICAL ONCE
OUTPATIENT
Start: 2024-10-02 | End: 2024-10-02

## 2024-10-02 RX ORDER — BACITRACIN ZINC 500 [USP'U]/G
OINTMENT TOPICAL ONCE
OUTPATIENT
Start: 2024-10-02 | End: 2024-10-02

## 2024-10-02 RX ORDER — LIDOCAINE 40 MG/G
CREAM TOPICAL ONCE
OUTPATIENT
Start: 2024-10-02 | End: 2024-10-02

## 2024-10-02 RX ORDER — BETAMETHASONE DIPROPIONATE 0.5 MG/G
CREAM TOPICAL ONCE
OUTPATIENT
Start: 2024-10-02 | End: 2024-10-02

## 2024-10-02 RX ORDER — GENTAMICIN SULFATE 1 MG/G
OINTMENT TOPICAL ONCE
OUTPATIENT
Start: 2024-10-02 | End: 2024-10-02

## 2024-10-02 RX ORDER — LIDOCAINE HYDROCHLORIDE 20 MG/ML
JELLY TOPICAL ONCE
OUTPATIENT
Start: 2024-10-02 | End: 2024-10-02

## 2024-10-02 RX ORDER — NEOMYCIN/BACITRACIN/POLYMYXINB 3.5-400-5K
OINTMENT (GRAM) TOPICAL ONCE
OUTPATIENT
Start: 2024-10-02 | End: 2024-10-02

## 2024-10-02 RX ORDER — LIDOCAINE HYDROCHLORIDE 40 MG/ML
SOLUTION TOPICAL ONCE
Status: COMPLETED | OUTPATIENT
Start: 2024-10-02 | End: 2024-10-02

## 2024-10-02 RX ORDER — LIDOCAINE 50 MG/G
OINTMENT TOPICAL ONCE
OUTPATIENT
Start: 2024-10-02 | End: 2024-10-02

## 2024-10-02 RX ADMIN — LIDOCAINE HYDROCHLORIDE 20 ML: 40 SOLUTION TOPICAL at 10:07

## 2024-10-03 LAB — ECHO BSA: 2.57 M2

## 2024-10-05 NOTE — FLOWSHEET NOTE
06/14/20 0819   Incision 04/28/20 Thigh Anterior;Right   Date First Assessed/Time First Assessed: 04/28/20 1140   Present on Hospital Admission: No  Primary Wound Type: Incision  Location: Thigh  Wound Location Orientation: Anterior;Right   Wound Assessment Drainage;Fragile   Nadia-wound Assessment Dry;Fragile;Edema   Closure None  (Small pin prick mid incision. )   Drainage Amount Moderate   Drainage Description Purulent;Serosanguinous   Odor None   Dressing/Treatment Foam  (Mepilex applied for protection. )   Dressing Changed Changed/New   Dressing Status Changed;Clean;Dry; Intact   Dressing Change Due 06/15/20   Patient stated he does not feel pressure in incision anymore. Mid incision appears to have small pin prick opening that leaked mod amount of milkly, purulent, serosang drainage. RN aware.
06/14/20 0942   Incision 04/28/20 Thigh Anterior;Right   Date First Assessed/Time First Assessed: 04/28/20 1140   Present on Hospital Admission: No  Primary Wound Type: Incision  Location: Thigh  Wound Location Orientation: Anterior;Right   Wound Assessment Drainage;Fragile   Nadia-wound Assessment Dry;Fragile;Edema   Closure None  (Small pin prick mid incision. )   Drainage Amount Large   Drainage Description Purulent;Serosanguinous  (Wound culture sent)   Odor None   Dressing/Treatment Foam  (Mepilex applied for protection. )   Dressing Changed Changed/New   Dressing Status Changed;Clean;Dry; Intact   Dressing Change Due 06/15/20
Intact

## 2024-10-07 DIAGNOSIS — G89.18 POSTOPERATIVE PAIN: ICD-10-CM

## 2024-10-07 RX ORDER — OXYCODONE HCL 10 MG/1
10 TABLET, FILM COATED, EXTENDED RELEASE ORAL 2 TIMES DAILY
Qty: 60 TABLET | Refills: 0 | Status: SHIPPED | OUTPATIENT
Start: 2024-10-07 | End: 2024-11-06

## 2024-10-07 RX ORDER — PRIMIDONE 50 MG/1
50 TABLET ORAL NIGHTLY
Qty: 30 TABLET | Refills: 0 | Status: SHIPPED | OUTPATIENT
Start: 2024-10-07 | End: 2024-11-06

## 2024-10-11 ENCOUNTER — TELEPHONE (OUTPATIENT)
Dept: PRIMARY CARE CLINIC | Age: 69
End: 2024-10-11

## 2024-10-11 NOTE — TELEPHONE ENCOUNTER
SPO2 running low since yesterday, turned up O2 to 9L 85-90% on 9 L, refusing ED, caregiver coming at 2pm, giving another dose of albuterol and monitoring O2, says it not better by 4pm, he will go to the ED.  HR   /72  The nurse wanted you to know.

## 2024-10-14 ENCOUNTER — TELEPHONE (OUTPATIENT)
Dept: PRIMARY CARE CLINIC | Age: 69
End: 2024-10-14

## 2024-10-15 ENCOUNTER — TELEPHONE (OUTPATIENT)
Dept: PRIMARY CARE CLINIC | Age: 69
End: 2024-10-15

## 2024-10-15 NOTE — TELEPHONE ENCOUNTER
Avril from [facility]oner's office is calling to see if you will sigh the pt's death certificate

## 2024-10-15 NOTE — TELEPHONE ENCOUNTER
I communicated to the paramedic who called me on Saturday about his death that I would sign.  Please make sure that the 's office fills in all the appropriate information about what time he passed.  Thank you
